# Patient Record
Sex: FEMALE | Race: WHITE | Employment: OTHER | URBAN - METROPOLITAN AREA
[De-identification: names, ages, dates, MRNs, and addresses within clinical notes are randomized per-mention and may not be internally consistent; named-entity substitution may affect disease eponyms.]

---

## 2018-11-23 ENCOUNTER — HOSPITAL ENCOUNTER (OUTPATIENT)
Age: 68
Discharge: HOME | End: 2018-11-23
Payer: MEDICARE

## 2018-11-23 ENCOUNTER — HOSPITAL ENCOUNTER (INPATIENT)
Age: 68
LOS: 4 days | Discharge: NURSING, REHAB OR LONG-TERM CARE | DRG: 534 | End: 2018-11-27
Attending: EMERGENCY MEDICINE | Admitting: FAMILY MEDICINE
Payer: MEDICARE

## 2018-11-23 ENCOUNTER — APPOINTMENT (OUTPATIENT)
Dept: CT IMAGING | Age: 68
DRG: 534 | End: 2018-11-23
Payer: MEDICARE

## 2018-11-23 ENCOUNTER — APPOINTMENT (OUTPATIENT)
Dept: GENERAL RADIOLOGY | Age: 68
DRG: 534 | End: 2018-11-23
Payer: MEDICARE

## 2018-11-23 DIAGNOSIS — E87.5 HYPERKALEMIA: ICD-10-CM

## 2018-11-23 DIAGNOSIS — N18.4 STAGE 4 CHRONIC KIDNEY DISEASE (HCC): ICD-10-CM

## 2018-11-23 DIAGNOSIS — R77.8 ELEVATED TROPONIN: ICD-10-CM

## 2018-11-23 DIAGNOSIS — S72.432A: Primary | ICD-10-CM

## 2018-11-23 PROBLEM — M25.9 KNEE PROBLEM: Status: ACTIVE | Noted: 2018-11-23

## 2018-11-23 LAB
ALBUMIN SERPL-MCNC: 3.2 G/DL (ref 3.5–5.2)
ALP BLD-CCNC: 71 U/L (ref 35–104)
ALT SERPL-CCNC: 14 U/L (ref 0–32)
ANION GAP SERPL CALCULATED.3IONS-SCNC: 15 MMOL/L (ref 7–16)
AST SERPL-CCNC: 17 U/L (ref 0–31)
BASOPHILS ABSOLUTE: 0.03 E9/L (ref 0–0.2)
BASOPHILS RELATIVE PERCENT: 0.2 % (ref 0–2)
BILIRUB SERPL-MCNC: 0.3 MG/DL (ref 0–1.2)
BUN BLDV-MCNC: 71 MG/DL (ref 8–23)
CALCIUM SERPL-MCNC: 8.3 MG/DL (ref 8.6–10.2)
CHLORIDE BLD-SCNC: 111 MMOL/L (ref 98–107)
CO2: 20 MMOL/L (ref 22–29)
CREAT SERPL-MCNC: 3.6 MG/DL (ref 0.5–1)
EKG ATRIAL RATE: 102 BPM
EKG P AXIS: 49 DEGREES
EKG P-R INTERVAL: 196 MS
EKG Q-T INTERVAL: 404 MS
EKG QRS DURATION: 128 MS
EKG QTC CALCULATION (BAZETT): 526 MS
EKG R AXIS: -37 DEGREES
EKG T AXIS: 97 DEGREES
EKG VENTRICULAR RATE: 102 BPM
EOSINOPHILS ABSOLUTE: 0.21 E9/L (ref 0.05–0.5)
EOSINOPHILS RELATIVE PERCENT: 1.5 % (ref 0–6)
GFR AFRICAN AMERICAN: 15
GFR NON-AFRICAN AMERICAN: 13 ML/MIN/1.73
GLUCOSE BLD-MCNC: 145 MG/DL (ref 74–99)
HCT VFR BLD CALC: 27.7 % (ref 34–48)
HEMOGLOBIN: 8.7 G/DL (ref 11.5–15.5)
IMMATURE GRANULOCYTES #: 0.11 E9/L
IMMATURE GRANULOCYTES %: 0.8 % (ref 0–5)
LYMPHOCYTES ABSOLUTE: 1.26 E9/L (ref 1.5–4)
LYMPHOCYTES RELATIVE PERCENT: 8.8 % (ref 20–42)
MCH RBC QN AUTO: 31.2 PG (ref 26–35)
MCHC RBC AUTO-ENTMCNC: 31.4 % (ref 32–34.5)
MCV RBC AUTO: 99.3 FL (ref 80–99.9)
MONOCYTES ABSOLUTE: 1.21 E9/L (ref 0.1–0.95)
MONOCYTES RELATIVE PERCENT: 8.5 % (ref 2–12)
NEUTROPHILS ABSOLUTE: 11.46 E9/L (ref 1.8–7.3)
NEUTROPHILS RELATIVE PERCENT: 80.2 % (ref 43–80)
PDW BLD-RTO: 12.3 FL (ref 11.5–15)
PLATELET # BLD: 218 E9/L (ref 130–450)
PMV BLD AUTO: 10.1 FL (ref 7–12)
POTASSIUM SERPL-SCNC: 5.4 MMOL/L (ref 3.5–5)
RBC # BLD: 2.79 E12/L (ref 3.5–5.5)
SODIUM BLD-SCNC: 146 MMOL/L (ref 132–146)
TOTAL PROTEIN: 6.1 G/DL (ref 6.4–8.3)
TROPONIN: 0.06 NG/ML (ref 0–0.03)
WBC # BLD: 14.3 E9/L (ref 4.5–11.5)

## 2018-11-23 PROCEDURE — 73502 X-RAY EXAM HIP UNI 2-3 VIEWS: CPT

## 2018-11-23 PROCEDURE — 99285 EMERGENCY DEPT VISIT HI MDM: CPT

## 2018-11-23 PROCEDURE — 73564 X-RAY EXAM KNEE 4 OR MORE: CPT

## 2018-11-23 PROCEDURE — 94760 N-INVAS EAR/PLS OXIMETRY 1: CPT

## 2018-11-23 PROCEDURE — 96374 THER/PROPH/DIAG INJ IV PUSH: CPT

## 2018-11-23 PROCEDURE — 72192 CT PELVIS W/O DYE: CPT

## 2018-11-23 PROCEDURE — 73700 CT LOWER EXTREMITY W/O DYE: CPT

## 2018-11-23 PROCEDURE — A0428 BLS: HCPCS

## 2018-11-23 PROCEDURE — 2580000003 HC RX 258: Performed by: EMERGENCY MEDICINE

## 2018-11-23 PROCEDURE — 36415 COLL VENOUS BLD VENIPUNCTURE: CPT

## 2018-11-23 PROCEDURE — 6360000002 HC RX W HCPCS: Performed by: EMERGENCY MEDICINE

## 2018-11-23 PROCEDURE — 85025 COMPLETE CBC W/AUTO DIFF WBC: CPT

## 2018-11-23 PROCEDURE — 80053 COMPREHEN METABOLIC PANEL: CPT

## 2018-11-23 PROCEDURE — A0425 GROUND MILEAGE: HCPCS

## 2018-11-23 PROCEDURE — 6370000000 HC RX 637 (ALT 250 FOR IP): Performed by: EMERGENCY MEDICINE

## 2018-11-23 PROCEDURE — 84484 ASSAY OF TROPONIN QUANT: CPT

## 2018-11-23 PROCEDURE — 71101 X-RAY EXAM UNILAT RIBS/CHEST: CPT

## 2018-11-23 PROCEDURE — 1200000000 HC SEMI PRIVATE

## 2018-11-23 PROCEDURE — 96375 TX/PRO/DX INJ NEW DRUG ADDON: CPT

## 2018-11-23 RX ORDER — MORPHINE SULFATE 4 MG/ML
4 INJECTION, SOLUTION INTRAMUSCULAR; INTRAVENOUS ONCE
Status: COMPLETED | OUTPATIENT
Start: 2018-11-23 | End: 2018-11-23

## 2018-11-23 RX ORDER — ONDANSETRON 2 MG/ML
4 INJECTION INTRAMUSCULAR; INTRAVENOUS ONCE
Status: COMPLETED | OUTPATIENT
Start: 2018-11-23 | End: 2018-11-23

## 2018-11-23 RX ORDER — FUROSEMIDE 10 MG/ML
40 INJECTION INTRAMUSCULAR; INTRAVENOUS ONCE
Status: COMPLETED | OUTPATIENT
Start: 2018-11-23 | End: 2018-11-23

## 2018-11-23 RX ORDER — OXYCODONE HYDROCHLORIDE AND ACETAMINOPHEN 5; 325 MG/1; MG/1
2 TABLET ORAL ONCE
Status: COMPLETED | OUTPATIENT
Start: 2018-11-23 | End: 2018-11-23

## 2018-11-23 RX ORDER — SODIUM POLYSTYRENE SULFONATE 15 G/60ML
15 SUSPENSION ORAL; RECTAL ONCE
Status: COMPLETED | OUTPATIENT
Start: 2018-11-23 | End: 2018-11-23

## 2018-11-23 RX ORDER — SODIUM CHLORIDE 9 MG/ML
500 INJECTION, SOLUTION INTRAVENOUS CONTINUOUS
Status: DISCONTINUED | OUTPATIENT
Start: 2018-11-23 | End: 2018-11-27 | Stop reason: HOSPADM

## 2018-11-23 RX ADMIN — SODIUM POLYSTYRENE SULFONATE 15 G: 15 SUSPENSION ORAL; RECTAL at 23:05

## 2018-11-23 RX ADMIN — SODIUM CHLORIDE 500 ML: 9 INJECTION, SOLUTION INTRAVENOUS at 21:45

## 2018-11-23 RX ADMIN — FUROSEMIDE 40 MG: 10 INJECTION, SOLUTION INTRAMUSCULAR; INTRAVENOUS at 23:05

## 2018-11-23 RX ADMIN — ONDANSETRON 4 MG: 2 INJECTION INTRAMUSCULAR; INTRAVENOUS at 21:40

## 2018-11-23 RX ADMIN — MORPHINE SULFATE 4 MG: 4 INJECTION INTRAVENOUS at 21:40

## 2018-11-23 RX ADMIN — OXYCODONE AND ACETAMINOPHEN 2 TABLET: 5; 325 TABLET ORAL at 19:29

## 2018-11-23 ASSESSMENT — PAIN DESCRIPTION - LOCATION
LOCATION: RIB CAGE
LOCATION: RIB CAGE
LOCATION: HIP;KNEE

## 2018-11-23 ASSESSMENT — PAIN SCALES - GENERAL
PAINLEVEL_OUTOF10: 6
PAINLEVEL_OUTOF10: 6
PAINLEVEL_OUTOF10: 8
PAINLEVEL_OUTOF10: 8
PAINLEVEL_OUTOF10: 4

## 2018-11-23 ASSESSMENT — PAIN DESCRIPTION - ORIENTATION
ORIENTATION: LEFT

## 2018-11-23 ASSESSMENT — PAIN DESCRIPTION - PROGRESSION: CLINICAL_PROGRESSION: GRADUALLY IMPROVING

## 2018-11-23 ASSESSMENT — PAIN DESCRIPTION - PAIN TYPE
TYPE: ACUTE PAIN
TYPE: ACUTE PAIN

## 2018-11-23 ASSESSMENT — PAIN DESCRIPTION - DESCRIPTORS
DESCRIPTORS: DISCOMFORT
DESCRIPTORS: DISCOMFORT
DESCRIPTORS: ACHING

## 2018-11-23 ASSESSMENT — PAIN DESCRIPTION - FREQUENCY: FREQUENCY: CONTINUOUS

## 2018-11-23 NOTE — ED PROVIDER NOTES
HPI:  18, Time: 6:41 PM        Laurie Tomas is a 70 y.o. female presenting to the ED for L knee and L rib pain , beginning 1 day ago after falling in her driveway. The complaint has been persistent, severe in severity, and worsened by changing position. Pt. Denies hitting her head or sustaining any LOC; no neck pain no back pain. She states she landed on her left side she primarily complains of the left knee and left rib pain and also some left hip pain. No other injures sustained. No relieving factors reported she's not had any nausea vomiting or any change in bowel or bladder habit patterns at all. Review of Systems:   Pertinent positives and negatives are stated within HPI, all other systems reviewed and are negative.      --------------------------------------------- PAST HISTORY ---------------------------------------------  Past Medical History:  has a past medical history of Arthritis; Chronic knee pain; Depression; Diabetes mellitus ([address] Gall stones; Hypertension; Kidney disease; Kidney stones; Obesity; Sarcoidosis; SOBOE (shortness of breath on exertion); Tremor; and Urinary anomaly. Past Surgical History:  has a past surgical history that includes Foot surgery (2009 ); Cystocopy (2011 );  section (x3); Upper gastrointestinal endoscopy (2.18.15); and Cholecystectomy (3/31/16). Social History:  reports that she quit smoking about 7 years ago. She has a 30.00 pack-year smoking history. She has never used smokeless tobacco. She reports that she does not drink alcohol or use drugs. Family History: family history includes Cancer in her sister. The patients home medications have been reviewed.     Allergies: Penicillins    -------------------------------------------------- RESULTS -------------------------------------------------  All laboratory and radiology results have been personally reviewed by myself   LABS:  Results for orders placed or performed during the hospital

## 2018-11-24 ENCOUNTER — APPOINTMENT (OUTPATIENT)
Dept: GENERAL RADIOLOGY | Age: 68
DRG: 534 | End: 2018-11-24
Payer: MEDICARE

## 2018-11-24 PROBLEM — S72.432A: Status: ACTIVE | Noted: 2018-11-24

## 2018-11-24 PROBLEM — N18.30 ANEMIA DUE TO STAGE 3 CHRONIC KIDNEY DISEASE (HCC): Status: ACTIVE | Noted: 2018-11-24

## 2018-11-24 PROBLEM — S72.415D CLOSED NONDISPLACED FRACTURE OF CONDYLE OF LEFT FEMUR WITH ROUTINE HEALING: Status: ACTIVE | Noted: 2018-11-24

## 2018-11-24 PROBLEM — D63.1 ANEMIA DUE TO STAGE 3 CHRONIC KIDNEY DISEASE (HCC): Status: ACTIVE | Noted: 2018-11-24

## 2018-11-24 LAB
ALBUMIN SERPL-MCNC: 2.8 G/DL (ref 3.5–5.2)
ALP BLD-CCNC: 62 U/L (ref 35–104)
ALT SERPL-CCNC: 12 U/L (ref 0–32)
ANION GAP SERPL CALCULATED.3IONS-SCNC: 14 MMOL/L (ref 7–16)
AST SERPL-CCNC: 14 U/L (ref 0–31)
BASOPHILS ABSOLUTE: 0.04 E9/L (ref 0–0.2)
BASOPHILS RELATIVE PERCENT: 0.5 % (ref 0–2)
BILIRUB SERPL-MCNC: 0.3 MG/DL (ref 0–1.2)
BUN BLDV-MCNC: 68 MG/DL (ref 8–23)
CALCIUM SERPL-MCNC: 8 MG/DL (ref 8.6–10.2)
CHLORIDE BLD-SCNC: 109 MMOL/L (ref 98–107)
CO2: 22 MMOL/L (ref 22–29)
CREAT SERPL-MCNC: 3.8 MG/DL (ref 0.5–1)
EOSINOPHILS ABSOLUTE: 0.19 E9/L (ref 0.05–0.5)
EOSINOPHILS RELATIVE PERCENT: 2.5 % (ref 0–6)
GFR AFRICAN AMERICAN: 14
GFR NON-AFRICAN AMERICAN: 12 ML/MIN/1.73
GLUCOSE BLD-MCNC: 197 MG/DL (ref 74–99)
HBA1C MFR BLD: 6.1 % (ref 4–5.6)
HCT VFR BLD CALC: 24.9 % (ref 34–48)
HEMOGLOBIN: 7.7 G/DL (ref 11.5–15.5)
IMMATURE GRANULOCYTES #: 0.05 E9/L
IMMATURE GRANULOCYTES %: 0.7 % (ref 0–5)
LYMPHOCYTES ABSOLUTE: 0.86 E9/L (ref 1.5–4)
LYMPHOCYTES RELATIVE PERCENT: 11.3 % (ref 20–42)
MCH RBC QN AUTO: 30.9 PG (ref 26–35)
MCHC RBC AUTO-ENTMCNC: 30.9 % (ref 32–34.5)
MCV RBC AUTO: 100 FL (ref 80–99.9)
METER GLUCOSE: 149 MG/DL (ref 74–99)
METER GLUCOSE: 152 MG/DL (ref 74–99)
METER GLUCOSE: 172 MG/DL (ref 74–99)
METER GLUCOSE: 81 MG/DL (ref 74–99)
MONOCYTES ABSOLUTE: 0.62 E9/L (ref 0.1–0.95)
MONOCYTES RELATIVE PERCENT: 8.2 % (ref 2–12)
NEUTROPHILS ABSOLUTE: 5.84 E9/L (ref 1.8–7.3)
NEUTROPHILS RELATIVE PERCENT: 76.8 % (ref 43–80)
PDW BLD-RTO: 12.4 FL (ref 11.5–15)
PLATELET # BLD: 170 E9/L (ref 130–450)
PMV BLD AUTO: 10.1 FL (ref 7–12)
POTASSIUM SERPL-SCNC: 5.1 MMOL/L (ref 3.5–5)
RBC # BLD: 2.49 E12/L (ref 3.5–5.5)
SODIUM BLD-SCNC: 145 MMOL/L (ref 132–146)
TOTAL PROTEIN: 5.6 G/DL (ref 6.4–8.3)
WBC # BLD: 7.6 E9/L (ref 4.5–11.5)

## 2018-11-24 PROCEDURE — 2580000003 HC RX 258: Performed by: FAMILY MEDICINE

## 2018-11-24 PROCEDURE — 82962 GLUCOSE BLOOD TEST: CPT

## 2018-11-24 PROCEDURE — 99222 1ST HOSP IP/OBS MODERATE 55: CPT | Performed by: FAMILY MEDICINE

## 2018-11-24 PROCEDURE — 80053 COMPREHEN METABOLIC PANEL: CPT

## 2018-11-24 PROCEDURE — 1200000000 HC SEMI PRIVATE

## 2018-11-24 PROCEDURE — 6360000002 HC RX W HCPCS: Performed by: FAMILY MEDICINE

## 2018-11-24 PROCEDURE — 73564 X-RAY EXAM KNEE 4 OR MORE: CPT

## 2018-11-24 PROCEDURE — 85025 COMPLETE CBC W/AUTO DIFF WBC: CPT

## 2018-11-24 PROCEDURE — 6370000000 HC RX 637 (ALT 250 FOR IP): Performed by: FAMILY MEDICINE

## 2018-11-24 PROCEDURE — 83036 HEMOGLOBIN GLYCOSYLATED A1C: CPT

## 2018-11-24 PROCEDURE — 36415 COLL VENOUS BLD VENIPUNCTURE: CPT

## 2018-11-24 RX ORDER — FERROUS SULFATE 325(65) MG
325 TABLET ORAL 2 TIMES DAILY WITH MEALS
Status: DISCONTINUED | OUTPATIENT
Start: 2018-11-24 | End: 2018-11-27 | Stop reason: HOSPADM

## 2018-11-24 RX ORDER — CALCITRIOL 0.25 UG/1
0.25 CAPSULE, LIQUID FILLED ORAL DAILY
Status: DISCONTINUED | OUTPATIENT
Start: 2018-11-24 | End: 2018-11-27 | Stop reason: HOSPADM

## 2018-11-24 RX ORDER — NICOTINE POLACRILEX 4 MG
15 LOZENGE BUCCAL PRN
Status: DISCONTINUED | OUTPATIENT
Start: 2018-11-24 | End: 2018-11-27 | Stop reason: HOSPADM

## 2018-11-24 RX ORDER — HYDRALAZINE HYDROCHLORIDE 25 MG/1
25 TABLET, FILM COATED ORAL 3 TIMES DAILY
Status: DISCONTINUED | OUTPATIENT
Start: 2018-11-24 | End: 2018-11-26

## 2018-11-24 RX ORDER — DEXTROSE MONOHYDRATE 25 G/50ML
12.5 INJECTION, SOLUTION INTRAVENOUS PRN
Status: DISCONTINUED | OUTPATIENT
Start: 2018-11-24 | End: 2018-11-27 | Stop reason: HOSPADM

## 2018-11-24 RX ORDER — INSULIN GLARGINE 100 [IU]/ML
10 INJECTION, SOLUTION SUBCUTANEOUS NIGHTLY
Status: DISCONTINUED | OUTPATIENT
Start: 2018-11-24 | End: 2018-11-25

## 2018-11-24 RX ORDER — DEXTROSE MONOHYDRATE 50 MG/ML
100 INJECTION, SOLUTION INTRAVENOUS PRN
Status: DISCONTINUED | OUTPATIENT
Start: 2018-11-24 | End: 2018-11-27 | Stop reason: HOSPADM

## 2018-11-24 RX ORDER — SODIUM CHLORIDE 0.9 % (FLUSH) 0.9 %
10 SYRINGE (ML) INJECTION PRN
Status: DISCONTINUED | OUTPATIENT
Start: 2018-11-24 | End: 2018-11-27 | Stop reason: HOSPADM

## 2018-11-24 RX ORDER — GLIMEPIRIDE 2 MG/1
2 TABLET ORAL 2 TIMES DAILY WITH MEALS
Status: DISCONTINUED | OUTPATIENT
Start: 2018-11-24 | End: 2018-11-27 | Stop reason: HOSPADM

## 2018-11-24 RX ORDER — MORPHINE SULFATE 2 MG/ML
1 INJECTION, SOLUTION INTRAMUSCULAR; INTRAVENOUS ONCE
Status: COMPLETED | OUTPATIENT
Start: 2018-11-24 | End: 2018-11-24

## 2018-11-24 RX ORDER — SODIUM CHLORIDE 0.9 % (FLUSH) 0.9 %
10 SYRINGE (ML) INJECTION EVERY 12 HOURS SCHEDULED
Status: DISCONTINUED | OUTPATIENT
Start: 2018-11-24 | End: 2018-11-27 | Stop reason: HOSPADM

## 2018-11-24 RX ORDER — ONDANSETRON 2 MG/ML
4 INJECTION INTRAMUSCULAR; INTRAVENOUS EVERY 6 HOURS PRN
Status: DISCONTINUED | OUTPATIENT
Start: 2018-11-24 | End: 2018-11-25

## 2018-11-24 RX ORDER — GABAPENTIN 300 MG/1
300 CAPSULE ORAL 3 TIMES DAILY
Status: DISCONTINUED | OUTPATIENT
Start: 2018-11-24 | End: 2018-11-27 | Stop reason: HOSPADM

## 2018-11-24 RX ORDER — AMLODIPINE BESYLATE 10 MG/1
10 TABLET ORAL DAILY
Status: DISCONTINUED | OUTPATIENT
Start: 2018-11-24 | End: 2018-11-27 | Stop reason: HOSPADM

## 2018-11-24 RX ADMIN — HYDRALAZINE HYDROCHLORIDE 25 MG: 25 TABLET, FILM COATED ORAL at 08:31

## 2018-11-24 RX ADMIN — HYDRALAZINE HYDROCHLORIDE 25 MG: 25 TABLET, FILM COATED ORAL at 22:00

## 2018-11-24 RX ADMIN — FERROUS SULFATE TAB 325 MG (65 MG ELEMENTAL FE) 325 MG: 325 (65 FE) TAB at 16:03

## 2018-11-24 RX ADMIN — FERROUS SULFATE TAB 325 MG (65 MG ELEMENTAL FE) 325 MG: 325 (65 FE) TAB at 08:31

## 2018-11-24 RX ADMIN — GABAPENTIN 300 MG: 300 CAPSULE ORAL at 08:31

## 2018-11-24 RX ADMIN — GLIMEPIRIDE 2 MG: 2 TABLET ORAL at 16:03

## 2018-11-24 RX ADMIN — MORPHINE SULFATE 1 MG: 2 INJECTION, SOLUTION INTRAMUSCULAR; INTRAVENOUS at 06:25

## 2018-11-24 RX ADMIN — INSULIN LISPRO 1 UNITS: 100 INJECTION, SOLUTION INTRAVENOUS; SUBCUTANEOUS at 22:01

## 2018-11-24 RX ADMIN — INSULIN LISPRO 2 UNITS: 100 INJECTION, SOLUTION INTRAVENOUS; SUBCUTANEOUS at 12:12

## 2018-11-24 RX ADMIN — HYDRALAZINE HYDROCHLORIDE 25 MG: 25 TABLET, FILM COATED ORAL at 13:36

## 2018-11-24 RX ADMIN — GLIMEPIRIDE 2 MG: 2 TABLET ORAL at 08:31

## 2018-11-24 RX ADMIN — CALCITRIOL 0.25 MCG: 0.25 CAPSULE ORAL at 08:31

## 2018-11-24 RX ADMIN — Medication 10 ML: at 08:32

## 2018-11-24 RX ADMIN — INSULIN GLARGINE 10 UNITS: 100 INJECTION, SOLUTION SUBCUTANEOUS at 22:01

## 2018-11-24 RX ADMIN — AMLODIPINE BESYLATE 10 MG: 10 TABLET ORAL at 08:32

## 2018-11-24 RX ADMIN — GABAPENTIN 300 MG: 300 CAPSULE ORAL at 13:37

## 2018-11-24 RX ADMIN — INSULIN LISPRO 2 UNITS: 100 INJECTION, SOLUTION INTRAVENOUS; SUBCUTANEOUS at 08:38

## 2018-11-24 RX ADMIN — Medication 10 ML: at 22:00

## 2018-11-24 RX ADMIN — GABAPENTIN 300 MG: 300 CAPSULE ORAL at 22:00

## 2018-11-24 ASSESSMENT — PAIN DESCRIPTION - ORIENTATION: ORIENTATION: LEFT

## 2018-11-24 ASSESSMENT — PAIN SCALES - GENERAL
PAINLEVEL_OUTOF10: 0
PAINLEVEL_OUTOF10: 0
PAINLEVEL_OUTOF10: 6
PAINLEVEL_OUTOF10: 0
PAINLEVEL_OUTOF10: 8
PAINLEVEL_OUTOF10: 0

## 2018-11-24 ASSESSMENT — PAIN DESCRIPTION - LOCATION: LOCATION: KNEE

## 2018-11-24 ASSESSMENT — PAIN DESCRIPTION - PROGRESSION: CLINICAL_PROGRESSION: NOT CHANGED

## 2018-11-24 ASSESSMENT — PAIN DESCRIPTION - DESCRIPTORS: DESCRIPTORS: ACHING;CONSTANT

## 2018-11-24 ASSESSMENT — PAIN DESCRIPTION - PAIN TYPE: TYPE: ACUTE PAIN

## 2018-11-24 ASSESSMENT — PAIN DESCRIPTION - FREQUENCY: FREQUENCY: CONTINUOUS

## 2018-11-24 NOTE — CONSULTS
Department of Orthopedic Surgery  Resident Consult Note          Reason for Consult:  Left Knee pain     HISTORY OF PRESENT ILLNESS:       Patient is a 70 y.o. female who presents with left knee pain after fall 1 day. Pt was able to able after the fall. But then started to have increase pain yesterday. Pt has a history of b/l knee OA treated will injections 1 week. The patient is community Ambulator without assist. Pt lives at Home. Denies numbness/tingling/paresthesias. Denies any other orthopedic complaints at this time.        Past Medical History:        Diagnosis Date    Arthritis     knees    Chronic knee pain     Depression     Diabetes mellitus (HCC)     type 2    Gall stones     Hypertension     Kidney disease     Kidney stones     Obesity     Sarcoidosis     SOBOE 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     Past Surgical History:        Procedure Laterality Date     SECTION  x3    CHOLECYSTECTOMY  3/31/16    Laparoscopic-Dr. Sharyle Grave    CYSTOSCOPY       for kidney stones    FOOT SURGERY       right     UPPER GASTROINTESTINAL ENDOSCOPY  2.18.15    Dr. Florencio Michele, [facility] Findings: Mild Gastrits and Duodenitis, 2cm Hiatal Hernia     Current Medications:   Current Facility-Administered Medications: amLODIPine (NORVASC) tablet 10 mg, 10 mg, Oral, Daily  calcitRIOL (ROCALTROL) capsule 0.25 mcg, 0.25 mcg, Oral, Daily  ferrous sulfate tablet 325 mg, 325 mg, Oral, BID WC  gabapentin (NEURONTIN) capsule 300 mg, 300 mg, Oral, TID  glimepiride (AMARYL) tablet 2 mg, 2 mg, Oral, BID WC  hydrALAZINE (APRESOLINE) tablet 25 mg, 25 mg, Oral, TID  insulin glargine (LANTUS) injection vial 10 Units, 10 Units, Subcutaneous, Nightly  sodium chloride flush 0.9 % injection 10 mL, 10 mL, Intravenous, 2 times per day  sodium chloride flush 0.9 % injection 10 mL, 10 mL, Intravenous, PRN  ondansetron (ZOFRAN) injection 4 mg, 4 mg, Intravenous, Q6H
Nightly  sodium chloride flush 0.9 % injection 10 mL, 10 mL, Intravenous, 2 times per day  sodium chloride flush 0.9 % injection 10 mL, 10 mL, Intravenous, PRN  ondansetron (ZOFRAN) injection 4 mg, 4 mg, Intravenous, Q6H PRN  glucose (GLUTOSE) 40 % oral gel 15 g, 15 g, Oral, PRN  dextrose 50 % solution 12.5 g, 12.5 g, Intravenous, PRN  glucagon (rDNA) injection 1 mg, 1 mg, Intramuscular, PRN  dextrose 5 % solution, 100 mL/hr, Intravenous, PRN  0.9 % sodium chloride infusion, 500 mL, Intravenous, Continuous     Allergies:  Patient has no known allergies.     Social History:   TOBACCO:   reports that she quit smoking about 7 years ago. She has a 30.00 pack-year smoking history. She has never used smokeless tobacco.  ETOH:   reports that she does not drink alcohol. DRUGS:   reports that she does not use drugs.   ACTIVITIES OF DAILY LIVING:    OCCUPATION:    Family History:   Family History         Family History   Problem Relation Age of Onset    Cancer Sister              REVIEW OF SYSTEMS:  CONSTITUTIONAL:  negative for  fevers, chills  EYES:  negative for blurred vision, visual disturbance  HEENT:  negative for  hearing loss, voice change  RESPIRATORY:  negative for  dyspnea, wheezing  CARDIOVASCULAR:  negative for  chest pain, palpitations  GASTROINTESTINAL:  negative for nausea, vomiting  GENITOURINARY:  negative for frequency, urinary incontinence  HEMATOLOGIC/LYMPHATIC:  negative for bleeding and petechiae  MUSCULOSKELETAL:  positive for  pain  NEUROLOGICAL:  negative for headaches, dizziness  BEHAVIOR/PSYCH:  negative for increased agitation and anxiety     PHYSICAL EXAM:    VITALS:  BP (!) 143/67   Pulse 78   Temp 97.4 °F (36.3 °C) (Temporal)   Resp 16   Ht 5' 2\" (1.575 m)   Wt 275 lb (124.7 kg)   SpO2 94%   BMI 50.30 kg/m²   CONSTITUTIONAL:  awake, alert, cooperative, no apparent distress, and appears stated age  MUSCULOSKELETAL:  Left lower Extremity:  · + TTP over the medial and lateral joint line

## 2018-11-25 LAB
ALBUMIN SERPL-MCNC: 2.8 G/DL (ref 3.5–5.2)
ALP BLD-CCNC: 61 U/L (ref 35–104)
ALT SERPL-CCNC: 11 U/L (ref 0–32)
ANION GAP SERPL CALCULATED.3IONS-SCNC: 13 MMOL/L (ref 7–16)
AST SERPL-CCNC: 13 U/L (ref 0–31)
BASOPHILS ABSOLUTE: 0.03 E9/L (ref 0–0.2)
BASOPHILS RELATIVE PERCENT: 0.4 % (ref 0–2)
BILIRUB SERPL-MCNC: 0.3 MG/DL (ref 0–1.2)
BUN BLDV-MCNC: 67 MG/DL (ref 8–23)
CALCIUM SERPL-MCNC: 7.8 MG/DL (ref 8.6–10.2)
CHLORIDE BLD-SCNC: 111 MMOL/L (ref 98–107)
CO2: 21 MMOL/L (ref 22–29)
CREAT SERPL-MCNC: 3.8 MG/DL (ref 0.5–1)
EOSINOPHILS ABSOLUTE: 0.11 E9/L (ref 0.05–0.5)
EOSINOPHILS RELATIVE PERCENT: 1.3 % (ref 0–6)
GFR AFRICAN AMERICAN: 14
GFR NON-AFRICAN AMERICAN: 12 ML/MIN/1.73
GLUCOSE BLD-MCNC: 39 MG/DL (ref 74–99)
HCT VFR BLD CALC: 24.6 % (ref 34–48)
HEMOGLOBIN: 7.6 G/DL (ref 11.5–15.5)
IMMATURE GRANULOCYTES #: 0.06 E9/L
IMMATURE GRANULOCYTES %: 0.7 % (ref 0–5)
LYMPHOCYTES ABSOLUTE: 1.18 E9/L (ref 1.5–4)
LYMPHOCYTES RELATIVE PERCENT: 14.1 % (ref 20–42)
MCH RBC QN AUTO: 30.9 PG (ref 26–35)
MCHC RBC AUTO-ENTMCNC: 30.9 % (ref 32–34.5)
MCV RBC AUTO: 100 FL (ref 80–99.9)
METER GLUCOSE: 136 MG/DL (ref 74–99)
METER GLUCOSE: 227 MG/DL (ref 74–99)
METER GLUCOSE: 43 MG/DL (ref 74–99)
METER GLUCOSE: 68 MG/DL (ref 74–99)
METER GLUCOSE: 84 MG/DL (ref 74–99)
MONOCYTES ABSOLUTE: 0.89 E9/L (ref 0.1–0.95)
MONOCYTES RELATIVE PERCENT: 10.7 % (ref 2–12)
NEUTROPHILS ABSOLUTE: 6.07 E9/L (ref 1.8–7.3)
NEUTROPHILS RELATIVE PERCENT: 72.8 % (ref 43–80)
PDW BLD-RTO: 12.5 FL (ref 11.5–15)
PLATELET # BLD: 172 E9/L (ref 130–450)
PMV BLD AUTO: 10.3 FL (ref 7–12)
POTASSIUM SERPL-SCNC: 4.6 MMOL/L (ref 3.5–5)
RBC # BLD: 2.46 E12/L (ref 3.5–5.5)
SODIUM BLD-SCNC: 145 MMOL/L (ref 132–146)
TOTAL PROTEIN: 5.4 G/DL (ref 6.4–8.3)
WBC # BLD: 8.3 E9/L (ref 4.5–11.5)

## 2018-11-25 PROCEDURE — 6370000000 HC RX 637 (ALT 250 FOR IP): Performed by: FAMILY MEDICINE

## 2018-11-25 PROCEDURE — 36415 COLL VENOUS BLD VENIPUNCTURE: CPT

## 2018-11-25 PROCEDURE — G8988 SELF CARE GOAL STATUS: HCPCS

## 2018-11-25 PROCEDURE — 82962 GLUCOSE BLOOD TEST: CPT

## 2018-11-25 PROCEDURE — 97530 THERAPEUTIC ACTIVITIES: CPT

## 2018-11-25 PROCEDURE — 99233 SBSQ HOSP IP/OBS HIGH 50: CPT | Performed by: FAMILY MEDICINE

## 2018-11-25 PROCEDURE — 85025 COMPLETE CBC W/AUTO DIFF WBC: CPT

## 2018-11-25 PROCEDURE — G8987 SELF CARE CURRENT STATUS: HCPCS

## 2018-11-25 PROCEDURE — G8979 MOBILITY GOAL STATUS: HCPCS

## 2018-11-25 PROCEDURE — 97166 OT EVAL MOD COMPLEX 45 MIN: CPT

## 2018-11-25 PROCEDURE — 1200000000 HC SEMI PRIVATE

## 2018-11-25 PROCEDURE — 80053 COMPREHEN METABOLIC PANEL: CPT

## 2018-11-25 PROCEDURE — G8978 MOBILITY CURRENT STATUS: HCPCS

## 2018-11-25 PROCEDURE — 2580000003 HC RX 258: Performed by: FAMILY MEDICINE

## 2018-11-25 RX ORDER — DOCUSATE SODIUM 100 MG/1
100 CAPSULE, LIQUID FILLED ORAL DAILY
Status: DISCONTINUED | OUTPATIENT
Start: 2018-11-25 | End: 2018-11-27 | Stop reason: HOSPADM

## 2018-11-25 RX ORDER — ACETAMINOPHEN 325 MG/1
650 TABLET ORAL EVERY 4 HOURS PRN
Status: DISCONTINUED | OUTPATIENT
Start: 2018-11-25 | End: 2018-11-27 | Stop reason: HOSPADM

## 2018-11-25 RX ADMIN — MAGNESIUM HYDROXIDE 30 ML: 400 SUSPENSION ORAL at 11:13

## 2018-11-25 RX ADMIN — GABAPENTIN 300 MG: 300 CAPSULE ORAL at 21:19

## 2018-11-25 RX ADMIN — AMLODIPINE BESYLATE 10 MG: 10 TABLET ORAL at 08:12

## 2018-11-25 RX ADMIN — Medication 10 ML: at 08:15

## 2018-11-25 RX ADMIN — GLIMEPIRIDE 2 MG: 2 TABLET ORAL at 17:41

## 2018-11-25 RX ADMIN — GABAPENTIN 300 MG: 300 CAPSULE ORAL at 14:15

## 2018-11-25 RX ADMIN — DEXTROSE 15 G: 15 GEL ORAL at 06:33

## 2018-11-25 RX ADMIN — GLIMEPIRIDE 2 MG: 2 TABLET ORAL at 08:12

## 2018-11-25 RX ADMIN — GABAPENTIN 300 MG: 300 CAPSULE ORAL at 08:12

## 2018-11-25 RX ADMIN — FERROUS SULFATE TAB 325 MG (65 MG ELEMENTAL FE) 325 MG: 325 (65 FE) TAB at 17:41

## 2018-11-25 RX ADMIN — CALCITRIOL 0.25 MCG: 0.25 CAPSULE ORAL at 08:12

## 2018-11-25 RX ADMIN — DOCUSATE SODIUM 100 MG: 100 CAPSULE, LIQUID FILLED ORAL at 08:12

## 2018-11-25 RX ADMIN — HYDRALAZINE HYDROCHLORIDE 25 MG: 25 TABLET, FILM COATED ORAL at 21:19

## 2018-11-25 RX ADMIN — HYDRALAZINE HYDROCHLORIDE 25 MG: 25 TABLET, FILM COATED ORAL at 08:12

## 2018-11-25 RX ADMIN — HYDRALAZINE HYDROCHLORIDE 25 MG: 25 TABLET, FILM COATED ORAL at 14:15

## 2018-11-25 RX ADMIN — FERROUS SULFATE TAB 325 MG (65 MG ELEMENTAL FE) 325 MG: 325 (65 FE) TAB at 08:12

## 2018-11-25 ASSESSMENT — PAIN SCALES - GENERAL
PAINLEVEL_OUTOF10: 0

## 2018-11-25 NOTE — PROGRESS NOTES
Admit Date: 11/23/2018       Subjective:    bp  143/64  p 79  Glucose low this am in the 40's up to 60 with tx. Will stop insuloin for now. a1c 6.1, see how this goies. hgb low  7.6,  ckd bun/cr  68/3.8   Did have GI bleed in 2016 with neg upper and lower scope. Bleeding scan showed small bowel etiology. Di have multiple transfusions. Will hemoccult stool. Pt states source of bleed never found. I donot know baselin but in June was 8.4  Will ask renal input.   Last scope 2016 Dr Lynn Paredes      Fem condyle fx part WB 50% with immobilizer- not in room yet and no getting up until immobilizer here         Objective:    Scheduled Meds:  Current Facility-Administered Medications   Medication Dose Route Frequency Provider Last Rate Last Dose    amLODIPine (NORVASC) tablet 10 mg  10 mg Oral Daily Astrid Guerin MD   10 mg at 11/24/18 4057    calcitRIOL (ROCALTROL) capsule 0.25 mcg  0.25 mcg Oral Daily Astrid Guerin MD   0.25 mcg at 11/24/18 0831    ferrous sulfate tablet 325 mg  325 mg Oral BID  Astrid Guerin MD   325 mg at 11/24/18 1603    gabapentin (NEURONTIN) capsule 300 mg  300 mg Oral TID Astrid Guerin MD   300 mg at 11/24/18 2200    glimepiride (AMARYL) tablet 2 mg  2 mg Oral BID  Astrid Guerin MD   2 mg at 11/24/18 1603    hydrALAZINE (APRESOLINE) tablet 25 mg  25 mg Oral TID Astrid Guerin MD   25 mg at 11/24/18 2200    insulin glargine (LANTUS) injection vial 10 Units  10 Units Subcutaneous Nightly Astrid Guerin MD   10 Units at 11/24/18 2201    sodium chloride flush 0.9 % injection 10 mL  10 mL Intravenous 2 times per day Astrid Guerin MD   10 mL at 11/24/18 2200    sodium chloride flush 0.9 % injection 10 mL  10 mL Intravenous PRN Astrid Guerin MD        ondansetron [facility]) injection 4 mg  4 mg Intravenous Q6H PRN Astrid Guerin MD        glucose (GLUTOSE) 40 % oral gel 15 g  15 g Oral PRN Astrid Guerin MD   15 g at 11/25/18 0633    dextrose 50 %

## 2018-11-26 LAB
ALBUMIN SERPL-MCNC: 2.7 G/DL (ref 3.5–5.2)
ALP BLD-CCNC: 61 U/L (ref 35–104)
ALT SERPL-CCNC: 11 U/L (ref 0–32)
ANION GAP SERPL CALCULATED.3IONS-SCNC: 12 MMOL/L (ref 7–16)
AST SERPL-CCNC: 12 U/L (ref 0–31)
BASOPHILS ABSOLUTE: 0.03 E9/L (ref 0–0.2)
BASOPHILS RELATIVE PERCENT: 0.5 % (ref 0–2)
BILIRUB SERPL-MCNC: 0.3 MG/DL (ref 0–1.2)
BUN BLDV-MCNC: 59 MG/DL (ref 8–23)
CALCIUM SERPL-MCNC: 8.4 MG/DL (ref 8.6–10.2)
CHLORIDE BLD-SCNC: 111 MMOL/L (ref 98–107)
CO2: 22 MMOL/L (ref 22–29)
CREAT SERPL-MCNC: 3.3 MG/DL (ref 0.5–1)
EOSINOPHILS ABSOLUTE: 0.18 E9/L (ref 0.05–0.5)
EOSINOPHILS RELATIVE PERCENT: 2.8 % (ref 0–6)
GFR AFRICAN AMERICAN: 17
GFR NON-AFRICAN AMERICAN: 14 ML/MIN/1.73
GLUCOSE BLD-MCNC: 74 MG/DL (ref 74–99)
HCT VFR BLD CALC: 25.8 % (ref 34–48)
HEMOGLOBIN: 7.9 G/DL (ref 11.5–15.5)
IMMATURE GRANULOCYTES #: 0.04 E9/L
IMMATURE GRANULOCYTES %: 0.6 % (ref 0–5)
LYMPHOCYTES ABSOLUTE: 1.07 E9/L (ref 1.5–4)
LYMPHOCYTES RELATIVE PERCENT: 16.7 % (ref 20–42)
MCH RBC QN AUTO: 30.3 PG (ref 26–35)
MCHC RBC AUTO-ENTMCNC: 30.6 % (ref 32–34.5)
MCV RBC AUTO: 98.9 FL (ref 80–99.9)
METER GLUCOSE: 159 MG/DL (ref 74–99)
METER GLUCOSE: 169 MG/DL (ref 74–99)
METER GLUCOSE: 62 MG/DL (ref 74–99)
METER GLUCOSE: 91 MG/DL (ref 74–99)
METER GLUCOSE: 99 MG/DL (ref 74–99)
MONOCYTES ABSOLUTE: 0.78 E9/L (ref 0.1–0.95)
MONOCYTES RELATIVE PERCENT: 12.2 % (ref 2–12)
NEUTROPHILS ABSOLUTE: 4.3 E9/L (ref 1.8–7.3)
NEUTROPHILS RELATIVE PERCENT: 67.2 % (ref 43–80)
PDW BLD-RTO: 12.4 FL (ref 11.5–15)
PLATELET # BLD: 163 E9/L (ref 130–450)
PMV BLD AUTO: 10.6 FL (ref 7–12)
POTASSIUM SERPL-SCNC: 4.7 MMOL/L (ref 3.5–5)
RBC # BLD: 2.61 E12/L (ref 3.5–5.5)
SODIUM BLD-SCNC: 145 MMOL/L (ref 132–146)
TOTAL PROTEIN: 5.6 G/DL (ref 6.4–8.3)
WBC # BLD: 6.4 E9/L (ref 4.5–11.5)

## 2018-11-26 PROCEDURE — 85025 COMPLETE CBC W/AUTO DIFF WBC: CPT

## 2018-11-26 PROCEDURE — 80053 COMPREHEN METABOLIC PANEL: CPT

## 2018-11-26 PROCEDURE — 82962 GLUCOSE BLOOD TEST: CPT

## 2018-11-26 PROCEDURE — 36415 COLL VENOUS BLD VENIPUNCTURE: CPT

## 2018-11-26 PROCEDURE — 6370000000 HC RX 637 (ALT 250 FOR IP): Performed by: FAMILY MEDICINE

## 2018-11-26 PROCEDURE — 6370000000 HC RX 637 (ALT 250 FOR IP): Performed by: NURSE PRACTITIONER

## 2018-11-26 PROCEDURE — 1200000000 HC SEMI PRIVATE

## 2018-11-26 PROCEDURE — 6360000002 HC RX W HCPCS: Performed by: FAMILY MEDICINE

## 2018-11-26 RX ORDER — HYDRALAZINE HYDROCHLORIDE 50 MG/1
50 TABLET, FILM COATED ORAL 3 TIMES DAILY
Status: DISCONTINUED | OUTPATIENT
Start: 2018-11-26 | End: 2018-11-27 | Stop reason: HOSPADM

## 2018-11-26 RX ORDER — HEPARIN SODIUM 10000 [USP'U]/ML
5000 INJECTION, SOLUTION INTRAVENOUS; SUBCUTANEOUS EVERY 8 HOURS
Status: DISCONTINUED | OUTPATIENT
Start: 2018-11-26 | End: 2018-11-27 | Stop reason: HOSPADM

## 2018-11-26 RX ORDER — HYDROCODONE BITARTRATE AND ACETAMINOPHEN 5; 325 MG/1; MG/1
1 TABLET ORAL EVERY 6 HOURS PRN
Status: DISCONTINUED | OUTPATIENT
Start: 2018-11-26 | End: 2018-11-27 | Stop reason: HOSPADM

## 2018-11-26 RX ADMIN — FERROUS SULFATE TAB 325 MG (65 MG ELEMENTAL FE) 325 MG: 325 (65 FE) TAB at 18:06

## 2018-11-26 RX ADMIN — DOCUSATE SODIUM 100 MG: 100 CAPSULE, LIQUID FILLED ORAL at 14:49

## 2018-11-26 RX ADMIN — GLIMEPIRIDE 2 MG: 2 TABLET ORAL at 18:06

## 2018-11-26 RX ADMIN — GABAPENTIN 300 MG: 300 CAPSULE ORAL at 20:29

## 2018-11-26 RX ADMIN — HYDRALAZINE HYDROCHLORIDE 25 MG: 25 TABLET, FILM COATED ORAL at 07:54

## 2018-11-26 RX ADMIN — GLIMEPIRIDE 2 MG: 2 TABLET ORAL at 07:54

## 2018-11-26 RX ADMIN — AMLODIPINE BESYLATE 10 MG: 10 TABLET ORAL at 07:54

## 2018-11-26 RX ADMIN — GABAPENTIN 300 MG: 300 CAPSULE ORAL at 14:49

## 2018-11-26 RX ADMIN — FERROUS SULFATE TAB 325 MG (65 MG ELEMENTAL FE) 325 MG: 325 (65 FE) TAB at 07:54

## 2018-11-26 RX ADMIN — GABAPENTIN 300 MG: 300 CAPSULE ORAL at 07:54

## 2018-11-26 RX ADMIN — HYDRALAZINE HYDROCHLORIDE 25 MG: 25 TABLET, FILM COATED ORAL at 14:48

## 2018-11-26 RX ADMIN — CALCITRIOL 0.25 MCG: 0.25 CAPSULE ORAL at 07:54

## 2018-11-26 RX ADMIN — HEPARIN SODIUM 5000 UNITS: 10000 INJECTION INTRAVENOUS; SUBCUTANEOUS at 19:03

## 2018-11-26 RX ADMIN — HYDRALAZINE HYDROCHLORIDE 50 MG: 50 TABLET, FILM COATED ORAL at 20:29

## 2018-11-26 ASSESSMENT — PAIN SCALES - GENERAL
PAINLEVEL_OUTOF10: 0

## 2018-11-26 NOTE — PROGRESS NOTES
NEPHROLOGY Attending   Progress Note  11/26/2018 5:54 PM  Subjective:   Admit Date: 11/23/2018  PCP: Tiara Jones MD    Interval History: following for management of CKD  11/26- awake and alert. She has some knee pain, other than that she is doing OK    Diet: DIET CARB CONTROL; Carb Control: 4 carb choices (60 gms)/meal    Data:   Scheduled Meds:   docusate sodium  100 mg Oral Daily    amLODIPine  10 mg Oral Daily    calcitRIOL  0.25 mcg Oral Daily    ferrous sulfate  325 mg Oral BID WC    gabapentin  300 mg Oral TID    glimepiride  2 mg Oral BID WC    hydrALAZINE  25 mg Oral TID    sodium chloride flush  10 mL Intravenous 2 times per day     Continuous Infusions:   dextrose      sodium chloride 500 mL (11/23/18 5018)     PRN Meds:trimethobenzamide, acetaminophen, magnesium hydroxide, sodium chloride flush, glucose, dextrose, glucagon (rDNA), dextrose    Intake/Output Summary (Last 24 hours) at 11/26/18 1754  Last data filed at 11/26/18 1528   Gross per 24 hour   Intake              840 ml   Output             1250 ml   Net             -410 ml     CBC:   Recent Labs      11/24/18   0941  11/25/18   0607  11/26/18   0501   WBC  7.6  8.3  6.4   HGB  7.7*  7.6*  7.9*   PLT  170  172  163     BMP:  Recent Labs      11/24/18   0941  11/25/18   0607  11/26/18   0501   NA  145  145  145   K  5.1*  4.6  4.7   CL  109*  111*  111*   CO2  22  21*  22   BUN  68*  67*  59*   CREATININE  3.8*  3.8*  3.3*   GLUCOSE  197*  39*  74     Hepatic: Recent Labs      11/24/18   0941  11/25/18   0607  11/26/18   0501   AST  14  13  12   ALT  12  11  11   BILITOT  0.3  0.3  0.3   ALKPHOS  62  61  61     Troponin:   Recent Labs      11/23/18   2150   TROPONINI  0.06*     BNP: No results for input(s): BNP in the last 72 hours. Lipids: No results for input(s): CHOL, HDL in the last 72 hours.     Invalid input(s): LDLCALCU  ABGs: No results found for: PHART, PO2ART, HJN5WNQ  INR: No results for input(s): INR in the last 72 definitive fractures seen on this study which is severely limited by patient's body habitus and underlying degree of osteopenia. ALERT:  THIS IS AN ABNORMAL REPORT 1. No acute fracture identified, limited study secondary to patient's body habitus and underlying osteopenia. If there is persistent clinical pain or symptomatology, a return to medical attention within 2-7 days and further imaging is recommended. . If there is persistent clinical concern for pain given the presence of the joint effusion a CT scan of the left knee could be utilized for better evaluation and to exclude the potential fracture at this time if clinically warranted. 2. Moderately severe joint space loss and degenerative changes of the medial and the lateral compartments with tricompartmental osteophyte formation within the medial, lateral and patellofemoral compartments. 3. Osteopenia. Fall. Vascular cast case. 5. Moderate-sized joint effusion. Xr Knee Right (min 4 Views)    Result Date: 2018  Patient MRN:  [id number] : 1950 Age: 70 years Gender: Female Order Date:  2018 10:45 AM EXAM: XR KNEE RIGHT (MIN 4 VIEWS) NUMBER OF IMAGES:  4 views INDICATION: Right knee pain in patient morbidly obese habitus COMPARISON: Prior right knee images 2013 and CT right knee images 2009 and yesterday 2007 hours FINDINGS: There is valgus alignment to the knee joint with narrowing of the medial and lateral joint compartments, and hyperostotic irregular flaring of the articular margins of the medial and lateral compartments as well as the patellofemoral joint. No definite fractures identified radiographically. Diabetic-type arterial calcification is noted, and a soft tissue circumscribed calcification in the proximal lower leg is likely phlebolith. There is no evidence of joint effusion. Advanced degenerative changes of the right knee with tricompartmental involvement and valgus alignment.  No evidence of joint effusion. Diabetic arterial calcifications are noted. Ct Pelvis Wo Contrast Additional Contrast? None    Addendum Date: 2018    Addendum: Additional images were obtained in a different angle of the right and left femoral head/neck and intertrochanteric region. A total number of images is presently 56. 89year-old female patient with a history of fall trauma injury pain. FINDINGS: With the additional available images there is no indication that there is an acute fracture in the right femoral head, neck and intertrochanteric region proximal right femoral shaft or in the right acetabulum. In the left hip, there is some relative increased density of the left femoral head/neck trabecular bone pattern . Cannot see conspicuously an acute fracture. Mild degenerative changes are seen in the left hip joint. There is no conspicuous a shortened deformity of the left femoral neck . There is no fractures in the pelvic bones. There is no fracture in the left acetabulum. There is no fracture of the sacral wings. Degenerative changes are seen in the SI joints. IMPRESSION: 1. Cannot conspicuously identified on acute displaced fracture in the left or in the right hip or in the pelvic bones 2. In case of persistence of clinical symptomatology MRI study can be a consideration to investigate for bone marrow edema. 3. Considering the body habitus of the patient correlation with nuclear medicine bone scan could be an alternative however the nuclear medicine bone scan will be positive for acute trauma injury after 3-5 days from the trauma insult. And    Result Date: 2018  Patient MRN: [id number] : 1950 Age:  70 years Order Date: 2018 8:15 PM Gender: Female Exam: CT PELVIS WO CONTRAST Number of Images: [id number] Indication:   Fall, evaluate pelvis, nondiagnostic x-rays Comparison: CT abdomen pelvis 3/29/2016. Pelvis and left hip x-rays 2018 Findings:  This examination was performed on a CT scanner with dose reduction. Technique: Low-dose CT  acquisition technique included one of following options; 1 . Automated exposure control, 2. Adjustment of MA and or KV according to patient's size or 3. Use of iterative reconstruction. . Lack of IV contrast limits the interpretation and the sensitivity of the exam in the evaluation of solid organs and vasculature, including but not limited to lack of detection of underlying mass or malignancy and/or any other potential abnormalities. Visualized portions of the uterus and bladder are grossly unremarkable. There is air within the bladder without identification of a Castelan catheter. There is fecal retention within the rectum and distal sigmoid colon. There is a fat-containing ventral hernia in the midline with transverse opening measuring approximately 8.4 cm. Vascular calcifications within the major arterial vessels. Limited evaluation of bony structures secondary to attenuation artifact. There is moderate joint space loss and acetabular sclerosis of the hips bilaterally. Visualized portions of the right iliac wing, left iliac wing, transverse processes, pedicles, lamina and spinous processes of the lumbar spine, visualized sacrum, demonstrate no acute fracture or lytic or blastic bony lesion. The left femoral head demonstrates increased density which is of concern and not well evaluated on this study. There are similar findings involving the right femoral head also demonstrating increased density but to a lesser degree. ALERT:  THIS IS AN ABNORMAL REPORT. Findings regarding additional imaging were communicated directly with Dr. Capri Sawant on 11/23/2018 9:01 PM . 1. Limited evaluation secondary to attenuation artifact. There is increased density of the left femoral head greater than the right. Findings are indeterminate. I cannot exclude the possibility of an impacted fracture. Additional imaging on sagittal and coronal planes is recommended.  Please note a fracture is not excluded General appearance: alert, appears stated age and cooperative  Skin: Skin color, texture, turgor normal. No rashes or lesions  HEENT: Head: Normocephalic, no lesions, without obvious abnormality. Neck: no adenopathy, no carotid bruit, no JVD, supple, symmetrical, trachea midline and thyroid not enlarged, symmetric, no tenderness/mass/nodules  Lungs: clear and equal bilaterally  Heart: S1, S2 normal and no S3 or S4  Abdomen: soft, non-tender; bowel sounds normal; no masses,  no organomegaly  Extremities: edema bilateral trace  Neurologic: Mental status: Alert, oriented, thought content appropriate      Assessment/Plan:   1. Acute kidney injury most likely prerenal from poor oral intake. This is superimposed on chronic kidney disease stage IV baseline 3.0-3.6, she remains stable at her recent baseline. 2. Mild hyperkalemia due to acute kidney injury, now resolved. 3. Hypertension with chronic kidney disease- sub-optimal control in the setting of pain. Will up-titrate hydralazine and follow. 4. Anemia due to chronic kidney disease will check iron studies in the am.          PABLO Echeverria - CNP     Patient seen and examined all key components of the physical performed independently , case discussed with NP, all pertinent labs and radiologic tests personally reviewed agree with above.       Leslie Blandon MD

## 2018-11-27 VITALS
HEART RATE: 78 BPM | WEIGHT: 275 LBS | TEMPERATURE: 97.8 F | SYSTOLIC BLOOD PRESSURE: 170 MMHG | DIASTOLIC BLOOD PRESSURE: 76 MMHG | RESPIRATION RATE: 18 BRPM | OXYGEN SATURATION: 97 % | HEIGHT: 62 IN | BODY MASS INDEX: 50.61 KG/M2

## 2018-11-27 LAB
ALBUMIN SERPL-MCNC: 3 G/DL (ref 3.5–5.2)
ALP BLD-CCNC: 58 U/L (ref 35–104)
ALT SERPL-CCNC: 9 U/L (ref 0–32)
ANION GAP SERPL CALCULATED.3IONS-SCNC: 13 MMOL/L (ref 7–16)
AST SERPL-CCNC: 12 U/L (ref 0–31)
BASOPHILS ABSOLUTE: 0.05 E9/L (ref 0–0.2)
BASOPHILS RELATIVE PERCENT: 0.7 % (ref 0–2)
BILIRUB SERPL-MCNC: 0.3 MG/DL (ref 0–1.2)
BUN BLDV-MCNC: 56 MG/DL (ref 8–23)
CALCIUM SERPL-MCNC: 8.2 MG/DL (ref 8.6–10.2)
CHLORIDE BLD-SCNC: 111 MMOL/L (ref 98–107)
CO2: 21 MMOL/L (ref 22–29)
CREAT SERPL-MCNC: 3.2 MG/DL (ref 0.5–1)
EOSINOPHILS ABSOLUTE: 0.17 E9/L (ref 0.05–0.5)
EOSINOPHILS RELATIVE PERCENT: 2.4 % (ref 0–6)
FERRITIN: 139 NG/ML
FOLATE: 3.9 NG/ML (ref 4.8–24.2)
GFR AFRICAN AMERICAN: 17
GFR NON-AFRICAN AMERICAN: 14 ML/MIN/1.73
GLUCOSE BLD-MCNC: 52 MG/DL (ref 74–99)
HCT VFR BLD CALC: 23.6 % (ref 34–48)
HEMOGLOBIN: 7.4 G/DL (ref 11.5–15.5)
IMMATURE GRANULOCYTES #: 0.05 E9/L
IMMATURE GRANULOCYTES %: 0.7 % (ref 0–5)
IRON SATURATION: 17 % (ref 15–50)
IRON: 31 MCG/DL (ref 37–145)
LYMPHOCYTES ABSOLUTE: 1.2 E9/L (ref 1.5–4)
LYMPHOCYTES RELATIVE PERCENT: 17.3 % (ref 20–42)
MCH RBC QN AUTO: 30.7 PG (ref 26–35)
MCHC RBC AUTO-ENTMCNC: 31.4 % (ref 32–34.5)
MCV RBC AUTO: 97.9 FL (ref 80–99.9)
METER GLUCOSE: 111 MG/DL (ref 74–99)
MONOCYTES ABSOLUTE: 0.77 E9/L (ref 0.1–0.95)
MONOCYTES RELATIVE PERCENT: 11.1 % (ref 2–12)
NEUTROPHILS ABSOLUTE: 4.71 E9/L (ref 1.8–7.3)
NEUTROPHILS RELATIVE PERCENT: 67.8 % (ref 43–80)
PDW BLD-RTO: 12.6 FL (ref 11.5–15)
PLATELET # BLD: 161 E9/L (ref 130–450)
PMV BLD AUTO: 10.5 FL (ref 7–12)
POTASSIUM SERPL-SCNC: 5 MMOL/L (ref 3.5–5)
RBC # BLD: 2.41 E12/L (ref 3.5–5.5)
SODIUM BLD-SCNC: 145 MMOL/L (ref 132–146)
TOTAL IRON BINDING CAPACITY: 183 MCG/DL (ref 250–450)
TOTAL PROTEIN: 5.4 G/DL (ref 6.4–8.3)
VITAMIN B-12: 1573 PG/ML (ref 211–946)
WBC # BLD: 7 E9/L (ref 4.5–11.5)

## 2018-11-27 PROCEDURE — 82962 GLUCOSE BLOOD TEST: CPT

## 2018-11-27 PROCEDURE — 6370000000 HC RX 637 (ALT 250 FOR IP): Performed by: FAMILY MEDICINE

## 2018-11-27 PROCEDURE — 82728 ASSAY OF FERRITIN: CPT

## 2018-11-27 PROCEDURE — 83540 ASSAY OF IRON: CPT

## 2018-11-27 PROCEDURE — 83550 IRON BINDING TEST: CPT

## 2018-11-27 PROCEDURE — 6360000002 HC RX W HCPCS: Performed by: FAMILY MEDICINE

## 2018-11-27 PROCEDURE — 82746 ASSAY OF FOLIC ACID SERUM: CPT

## 2018-11-27 PROCEDURE — 97535 SELF CARE MNGMENT TRAINING: CPT

## 2018-11-27 PROCEDURE — 97530 THERAPEUTIC ACTIVITIES: CPT

## 2018-11-27 PROCEDURE — 85025 COMPLETE CBC W/AUTO DIFF WBC: CPT

## 2018-11-27 PROCEDURE — 80053 COMPREHEN METABOLIC PANEL: CPT

## 2018-11-27 PROCEDURE — 36415 COLL VENOUS BLD VENIPUNCTURE: CPT

## 2018-11-27 PROCEDURE — 6370000000 HC RX 637 (ALT 250 FOR IP): Performed by: NURSE PRACTITIONER

## 2018-11-27 PROCEDURE — 82607 VITAMIN B-12: CPT

## 2018-11-27 RX ORDER — HEPARIN SODIUM 10000 [USP'U]/ML
5000 INJECTION, SOLUTION INTRAVENOUS; SUBCUTANEOUS EVERY 8 HOURS
Status: ON HOLD | DISCHARGE
Start: 2018-11-27 | End: 2019-05-03 | Stop reason: HOSPADM

## 2018-11-27 RX ORDER — HYDROCODONE BITARTRATE AND ACETAMINOPHEN 5; 325 MG/1; MG/1
1 TABLET ORAL EVERY 6 HOURS PRN
Qty: 28 TABLET | Refills: 0 | Status: ON HOLD | OUTPATIENT
Start: 2018-11-27 | End: 2019-05-03

## 2018-11-27 RX ORDER — PSEUDOEPHEDRINE HCL 30 MG
100 TABLET ORAL DAILY
Status: ON HOLD | DISCHARGE
Start: 2018-11-28 | End: 2019-05-30

## 2018-11-27 RX ADMIN — FERROUS SULFATE TAB 325 MG (65 MG ELEMENTAL FE) 325 MG: 325 (65 FE) TAB at 16:56

## 2018-11-27 RX ADMIN — GLIMEPIRIDE 2 MG: 2 TABLET ORAL at 16:56

## 2018-11-27 RX ADMIN — HYDRALAZINE HYDROCHLORIDE 50 MG: 50 TABLET, FILM COATED ORAL at 08:27

## 2018-11-27 RX ADMIN — HEPARIN SODIUM 5000 UNITS: 10000 INJECTION INTRAVENOUS; SUBCUTANEOUS at 14:00

## 2018-11-27 RX ADMIN — HYDRALAZINE HYDROCHLORIDE 50 MG: 50 TABLET, FILM COATED ORAL at 14:01

## 2018-11-27 RX ADMIN — HYDROCODONE BITARTRATE AND ACETAMINOPHEN 1 TABLET: 5; 325 TABLET ORAL at 00:07

## 2018-11-27 RX ADMIN — HYDROCODONE BITARTRATE AND ACETAMINOPHEN 1 TABLET: 5; 325 TABLET ORAL at 12:47

## 2018-11-27 RX ADMIN — AMLODIPINE BESYLATE 10 MG: 10 TABLET ORAL at 08:27

## 2018-11-27 RX ADMIN — DOCUSATE SODIUM 100 MG: 100 CAPSULE, LIQUID FILLED ORAL at 08:27

## 2018-11-27 RX ADMIN — FERROUS SULFATE TAB 325 MG (65 MG ELEMENTAL FE) 325 MG: 325 (65 FE) TAB at 08:27

## 2018-11-27 RX ADMIN — GABAPENTIN 300 MG: 300 CAPSULE ORAL at 08:27

## 2018-11-27 RX ADMIN — CALCITRIOL 0.25 MCG: 0.25 CAPSULE ORAL at 08:27

## 2018-11-27 RX ADMIN — HEPARIN SODIUM 5000 UNITS: 10000 INJECTION INTRAVENOUS; SUBCUTANEOUS at 06:42

## 2018-11-27 RX ADMIN — GABAPENTIN 300 MG: 300 CAPSULE ORAL at 14:01

## 2018-11-27 ASSESSMENT — PAIN DESCRIPTION - ONSET
ONSET: ON-GOING
ONSET: GRADUAL
ONSET: GRADUAL

## 2018-11-27 ASSESSMENT — PAIN SCALES - GENERAL
PAINLEVEL_OUTOF10: 8
PAINLEVEL_OUTOF10: 0
PAINLEVEL_OUTOF10: 6
PAINLEVEL_OUTOF10: 4

## 2018-11-27 ASSESSMENT — PAIN DESCRIPTION - PAIN TYPE
TYPE: ACUTE PAIN

## 2018-11-27 ASSESSMENT — PAIN DESCRIPTION - ORIENTATION
ORIENTATION: LEFT

## 2018-11-27 ASSESSMENT — PAIN DESCRIPTION - DESCRIPTORS
DESCRIPTORS: ACHING;CONSTANT;DISCOMFORT
DESCRIPTORS: ACHING;DISCOMFORT
DESCRIPTORS: ACHING;SORE;TIRING

## 2018-11-27 ASSESSMENT — PAIN DESCRIPTION - FREQUENCY
FREQUENCY: INTERMITTENT
FREQUENCY: CONTINUOUS
FREQUENCY: CONTINUOUS

## 2018-11-27 ASSESSMENT — PAIN SCALES - WONG BAKER: WONGBAKER_NUMERICALRESPONSE: 0

## 2018-11-27 ASSESSMENT — PAIN DESCRIPTION - PROGRESSION
CLINICAL_PROGRESSION: GRADUALLY IMPROVING
CLINICAL_PROGRESSION: NOT CHANGED

## 2018-11-27 ASSESSMENT — PAIN DESCRIPTION - LOCATION
LOCATION: KNEE
LOCATION: RIB CAGE
LOCATION: KNEE

## 2018-11-27 NOTE — CARE COORDINATION
Care transition note-pt has been accepted at [facility] and insurance precert has been obtained. However, the pt has a balance of $469 that must be paid before they will allow her to go to the facility. She stated that she cannot afford to pay this amount. She was offered a list of other facilities but she has refused to look at it. She states that if she cannot go to [facility], she will return home. She has adult children, but going to their homes is not an option. She states she will return home. Awaiting custom brace. Informed her that there is a discharge order.

## 2018-11-27 NOTE — PROGRESS NOTES
Nurse to nurse report called to [facility] at [facility]. Faxed AVS to [facility]# [phone].

## 2018-11-27 NOTE — PROGRESS NOTES
NEPHROLOGY Attending   Progress Note  11/27/2018 11:51 AM  Subjective:   Admit Date: 11/23/2018  PCP: Zohreh Joseph MD    Interval History: following for management of CKD  11/26- awake and alert. She has some knee pain, other than that she is doing OK  11/27:No  New c/o; awaiting leg brace    Diet: DIET CARB CONTROL; Carb Control: 4 carb choices (60 gms)/meal    Data:   Scheduled Meds:   hydrALAZINE  50 mg Oral TID    heparin (porcine)  5,000 Units Subcutaneous Q8H    docusate sodium  100 mg Oral Daily    amLODIPine  10 mg Oral Daily    calcitRIOL  0.25 mcg Oral Daily    ferrous sulfate  325 mg Oral BID WC    gabapentin  300 mg Oral TID    glimepiride  2 mg Oral BID WC    sodium chloride flush  10 mL Intravenous 2 times per day     Continuous Infusions:   dextrose      sodium chloride 500 mL (11/23/18 2145)     PRN Meds:HYDROcodone 5 mg - acetaminophen, trimethobenzamide, acetaminophen, magnesium hydroxide, sodium chloride flush, glucose, dextrose, glucagon (rDNA), dextrose    Intake/Output Summary (Last 24 hours) at 11/27/18 1151  Last data filed at 11/27/18 1044   Gross per 24 hour   Intake              720 ml   Output             2100 ml   Net            -1380 ml     CBC:   Recent Labs      11/25/18   0607  11/26/18   0501  11/27/18   0452   WBC  8.3  6.4  7.0   HGB  7.6*  7.9*  7.4*   PLT  172  163  161     BMP:    Recent Labs      11/25/18   0607  11/26/18   0501  11/27/18   0452   NA  145  145  145   K  4.6  4.7  5.0   CL  111*  111*  111*   CO2  21*  22  21*   BUN  67*  59*  56*   CREATININE  3.8*  3.3*  3.2*   GLUCOSE  39*  74  52*     Hepatic:   Recent Labs      11/25/18   0607  11/26/18   0501  11/27/18   0452   AST  13  12  12   ALT  11  11  9   BILITOT  0.3  0.3  0.3   ALKPHOS  61  61  58     Troponin:   No results for input(s): TROPONINI in the last 72 hours. BNP: No results for input(s): BNP in the last 72 hours. Lipids: No results for input(s): CHOL, HDL in the last 72 hours.     Invalid input(s): LDLCALCU  ABGs: No results found for: PHART, PO2ART, ZTO2UQA  INR: No results for input(s): INR in the last 72 hours. Objective:   Vitals:   Vitals:    11/27/18 0745   BP: (!) 170/76   Pulse: 78   Resp: 18   Temp: 97.8 °F (36.6 °C)   SpO2: 97%     Patient Vitals for the past 24 hrs:   BP Temp Temp src Pulse Resp SpO2   11/27/18 0745 (!) 170/76 97.8 °F (36.6 °C) Temporal 78 18 97 %   11/27/18 0000 (!) 151/68 98.6 °F (37 °C) Temporal 90 18 -   11/26/18 1500 (!) 155/70 97.8 °F (36.6 °C) Temporal 70 16 95 %   11/26/18 1448 (!) 161/74 - - - - -     General appearance: alert in no acute distress  Skin: Skin color, texture, turgor normal. No rashes or lesions  HEENT: Head: Normocephalic, no lesions, without obvious abnormality. Neck: no adenopathy, no carotid bruit, no JVD, supple, symmetrical, trachea midline and thyroid not enlarged, symmetric, no tenderness/mass/nodules  Lungs: clear and equal bilaterally  Heart: S1, S2 normal and no S3 or S4  Abdomen: soft, non-tender; bowel sounds normal; no masses,  no organomegaly  Extremities: edema bilateral trace  Neurologic: Mental status: Alert, oriented, thought content appropriate      Assessment/Plan:   1. Acute kidney injury most likely prerenal from poor oral intake. This is superimposed on chronic kidney disease stage IV baseline 3.0-3.6; Cr 3.8 on presentation, now improved to 3.2  2. Mild hyperkalemia due to acute kidney injury, now resolved; continue to monitor  3. Hypertension with chronic kidney disease- sub-optimal control in the setting of pain. Will up-titrate hydralazine and follow.    4. Anemia due to chronic kidney disease, suspect some blood loss; transfuse if Hb drops further      OK for discharge from Renal standpoint, once cleared by others         Kelton Chahal MD

## 2019-04-27 ENCOUNTER — HOSPITAL ENCOUNTER (INPATIENT)
Age: 69
LOS: 5 days | Discharge: NURSING, REHAB OR LONG-TERM CARE | DRG: 193 | End: 2019-05-03
Attending: EMERGENCY MEDICINE | Admitting: FAMILY MEDICINE
Payer: MEDICARE

## 2019-04-27 ENCOUNTER — APPOINTMENT (OUTPATIENT)
Dept: GENERAL RADIOLOGY | Age: 69
DRG: 193 | End: 2019-04-27
Payer: MEDICARE

## 2019-04-27 DIAGNOSIS — J96.01 ACUTE RESPIRATORY FAILURE WITH HYPOXIA (HCC): Primary | ICD-10-CM

## 2019-04-27 DIAGNOSIS — N18.9 CHRONIC KIDNEY DISEASE, UNSPECIFIED CKD STAGE: ICD-10-CM

## 2019-04-27 DIAGNOSIS — F41.9 ANXIETY: ICD-10-CM

## 2019-04-27 DIAGNOSIS — J18.9 COMMUNITY ACQUIRED PNEUMONIA, UNSPECIFIED LATERALITY: ICD-10-CM

## 2019-04-27 DIAGNOSIS — E87.5 HYPERKALEMIA: ICD-10-CM

## 2019-04-27 DIAGNOSIS — S72.432A: ICD-10-CM

## 2019-04-27 PROCEDURE — 84484 ASSAY OF TROPONIN QUANT: CPT

## 2019-04-27 PROCEDURE — 87502 INFLUENZA DNA AMP PROBE: CPT

## 2019-04-27 PROCEDURE — 94760 N-INVAS EAR/PLS OXIMETRY 1: CPT

## 2019-04-27 PROCEDURE — 83880 ASSAY OF NATRIURETIC PEPTIDE: CPT

## 2019-04-27 PROCEDURE — 85025 COMPLETE CBC W/AUTO DIFF WBC: CPT

## 2019-04-27 PROCEDURE — 93005 ELECTROCARDIOGRAM TRACING: CPT | Performed by: STUDENT IN AN ORGANIZED HEALTH CARE EDUCATION/TRAINING PROGRAM

## 2019-04-27 PROCEDURE — 83605 ASSAY OF LACTIC ACID: CPT

## 2019-04-27 PROCEDURE — 85378 FIBRIN DEGRADE SEMIQUANT: CPT

## 2019-04-27 PROCEDURE — 99285 EMERGENCY DEPT VISIT HI MDM: CPT

## 2019-04-27 PROCEDURE — 87040 BLOOD CULTURE FOR BACTERIA: CPT

## 2019-04-27 PROCEDURE — 71045 X-RAY EXAM CHEST 1 VIEW: CPT

## 2019-04-27 PROCEDURE — 80048 BASIC METABOLIC PNL TOTAL CA: CPT

## 2019-04-27 PROCEDURE — 36415 COLL VENOUS BLD VENIPUNCTURE: CPT

## 2019-04-27 ASSESSMENT — ENCOUNTER SYMPTOMS
SINUS PRESSURE: 0
SORE THROAT: 0
EYE PAIN: 0
PHOTOPHOBIA: 0
ABDOMINAL PAIN: 0
ABDOMINAL DISTENTION: 0
RHINORRHEA: 0
CHEST TIGHTNESS: 0
DIARRHEA: 0
BLOOD IN STOOL: 0
CONSTIPATION: 0
COUGH: 1
BACK PAIN: 0
NAUSEA: 0
WHEEZING: 0
EYE REDNESS: 0
TROUBLE SWALLOWING: 0
VOMITING: 0

## 2019-04-28 ENCOUNTER — APPOINTMENT (OUTPATIENT)
Dept: NUCLEAR MEDICINE | Age: 69
DRG: 193 | End: 2019-04-28
Payer: MEDICARE

## 2019-04-28 PROBLEM — J96.01 ACUTE RESPIRATORY FAILURE WITH HYPOXIA (HCC): Status: ACTIVE | Noted: 2019-04-28

## 2019-04-28 PROBLEM — J15.9 PNEUMONIA, BACTERIAL: Status: ACTIVE | Noted: 2019-04-28

## 2019-04-28 LAB
ANION GAP SERPL CALCULATED.3IONS-SCNC: 12 MMOL/L (ref 7–16)
ANION GAP SERPL CALCULATED.3IONS-SCNC: 9 MMOL/L (ref 7–16)
APTT: 28.2 SEC (ref 24.5–35.1)
BASOPHILS ABSOLUTE: 0 E9/L (ref 0–0.2)
BASOPHILS RELATIVE PERCENT: 0.2 % (ref 0–2)
BUN BLDV-MCNC: 43 MG/DL (ref 8–23)
BUN BLDV-MCNC: 44 MG/DL (ref 8–23)
CALCIUM SERPL-MCNC: 8.5 MG/DL (ref 8.6–10.2)
CALCIUM SERPL-MCNC: 8.6 MG/DL (ref 8.6–10.2)
CHLORIDE BLD-SCNC: 112 MMOL/L (ref 98–107)
CHLORIDE BLD-SCNC: 113 MMOL/L (ref 98–107)
CO2: 20 MMOL/L (ref 22–29)
CO2: 23 MMOL/L (ref 22–29)
CREAT SERPL-MCNC: 3.2 MG/DL (ref 0.5–1)
CREAT SERPL-MCNC: 3.4 MG/DL (ref 0.5–1)
D DIMER: 746 NG/ML DDU
EOSINOPHILS ABSOLUTE: 0 E9/L (ref 0.05–0.5)
EOSINOPHILS RELATIVE PERCENT: 0 % (ref 0–6)
FILM ARRAY ADENOVIRUS: NORMAL
FILM ARRAY BORDETELLA PERTUSSIS: NORMAL
FILM ARRAY CHLAMYDOPHILIA PNEUMONIAE: NORMAL
FILM ARRAY CORONAVIRUS 229E: NORMAL
FILM ARRAY CORONAVIRUS HKU1: NORMAL
FILM ARRAY CORONAVIRUS NL63: NORMAL
FILM ARRAY CORONAVIRUS OC43: NORMAL
FILM ARRAY INFLUENZA A VIRUS 09H1: NORMAL
FILM ARRAY INFLUENZA A VIRUS H1: NORMAL
FILM ARRAY INFLUENZA A VIRUS H3: NORMAL
FILM ARRAY INFLUENZA A VIRUS: NORMAL
FILM ARRAY INFLUENZA B: NORMAL
FILM ARRAY METAPNEUMOVIRUS: NORMAL
FILM ARRAY MYCOPLASMA PNEUMONIAE: NORMAL
FILM ARRAY PARAINFLUENZA VIRUS 1: NORMAL
FILM ARRAY PARAINFLUENZA VIRUS 2: NORMAL
FILM ARRAY PARAINFLUENZA VIRUS 3: NORMAL
FILM ARRAY PARAINFLUENZA VIRUS 4: NORMAL
FILM ARRAY RESPIRATORY SYNCITIAL VIRUS: NORMAL
FILM ARRAY RHINOVIRUS/ENTEROVIRUS: NORMAL
GFR AFRICAN AMERICAN: 16
GFR AFRICAN AMERICAN: 17
GFR NON-AFRICAN AMERICAN: 13 ML/MIN/1.73
GFR NON-AFRICAN AMERICAN: 14 ML/MIN/1.73
GLUCOSE BLD-MCNC: 186 MG/DL (ref 74–99)
GLUCOSE BLD-MCNC: 96 MG/DL (ref 74–99)
HCT VFR BLD CALC: 27.6 % (ref 34–48)
HCT VFR BLD CALC: 31.4 % (ref 34–48)
HEMOGLOBIN: 8.4 G/DL (ref 11.5–15.5)
HEMOGLOBIN: 9.4 G/DL (ref 11.5–15.5)
INFLUENZA A BY PCR: NOT DETECTED
INFLUENZA B BY PCR: NOT DETECTED
INR BLD: 1.1
L. PNEUMOPHILA SEROGP 1 UR AG: NORMAL
LACTIC ACID, SEPSIS: 1 MMOL/L (ref 0.5–1.9)
LACTIC ACID, SEPSIS: 1.8 MMOL/L (ref 0.5–1.9)
LYMPHOCYTES ABSOLUTE: 1.13 E9/L (ref 1.5–4)
LYMPHOCYTES RELATIVE PERCENT: 5.2 % (ref 20–42)
MCH RBC QN AUTO: 30.2 PG (ref 26–35)
MCH RBC QN AUTO: 30.9 PG (ref 26–35)
MCHC RBC AUTO-ENTMCNC: 29.9 % (ref 32–34.5)
MCHC RBC AUTO-ENTMCNC: 30.4 % (ref 32–34.5)
MCV RBC AUTO: 101 FL (ref 80–99.9)
MCV RBC AUTO: 101.5 FL (ref 80–99.9)
METER GLUCOSE: 180 MG/DL (ref 74–99)
METER GLUCOSE: 187 MG/DL (ref 74–99)
METER GLUCOSE: 222 MG/DL (ref 74–99)
METER GLUCOSE: 98 MG/DL (ref 74–99)
MONOCYTES ABSOLUTE: 0.9 E9/L (ref 0.1–0.95)
MONOCYTES RELATIVE PERCENT: 3.5 % (ref 2–12)
NEUTROPHILS ABSOLUTE: 20.48 E9/L (ref 1.8–7.3)
NEUTROPHILS RELATIVE PERCENT: 91.3 % (ref 43–80)
PDW BLD-RTO: 13.4 FL (ref 11.5–15)
PDW BLD-RTO: 13.6 FL (ref 11.5–15)
PLATELET # BLD: 196 E9/L (ref 130–450)
PLATELET # BLD: 203 E9/L (ref 130–450)
PMV BLD AUTO: 10.8 FL (ref 7–12)
PMV BLD AUTO: 10.9 FL (ref 7–12)
POTASSIUM SERPL-SCNC: 4.7 MMOL/L (ref 3.5–5)
POTASSIUM SERPL-SCNC: 5.7 MMOL/L (ref 3.5–5)
PRO-BNP: 3230 PG/ML (ref 0–125)
PROTHROMBIN TIME: 13.2 SEC (ref 9.3–12.4)
RBC # BLD: 2.72 E12/L (ref 3.5–5.5)
RBC # BLD: 3.11 E12/L (ref 3.5–5.5)
RBC # BLD: NORMAL 10*6/UL
SODIUM BLD-SCNC: 144 MMOL/L (ref 132–146)
SODIUM BLD-SCNC: 145 MMOL/L (ref 132–146)
STREP PNEUMONIAE ANTIGEN, URINE: NORMAL
TROPONIN: 0.04 NG/ML (ref 0–0.03)
WBC # BLD: 18.7 E9/L (ref 4.5–11.5)
WBC # BLD: 22.5 E9/L (ref 4.5–11.5)

## 2019-04-28 PROCEDURE — 87581 M.PNEUMON DNA AMP PROBE: CPT

## 2019-04-28 PROCEDURE — 6360000002 HC RX W HCPCS: Performed by: FAMILY MEDICINE

## 2019-04-28 PROCEDURE — 2700000000 HC OXYGEN THERAPY PER DAY

## 2019-04-28 PROCEDURE — 85610 PROTHROMBIN TIME: CPT

## 2019-04-28 PROCEDURE — A9558 XE133 XENON 10MCI: HCPCS | Performed by: RADIOLOGY

## 2019-04-28 PROCEDURE — 87450 HC DIRECT STREP B ANTIGEN: CPT

## 2019-04-28 PROCEDURE — 85027 COMPLETE CBC AUTOMATED: CPT

## 2019-04-28 PROCEDURE — 87040 BLOOD CULTURE FOR BACTERIA: CPT

## 2019-04-28 PROCEDURE — 87486 CHLMYD PNEUM DNA AMP PROBE: CPT

## 2019-04-28 PROCEDURE — 6370000000 HC RX 637 (ALT 250 FOR IP): Performed by: INTERNAL MEDICINE

## 2019-04-28 PROCEDURE — 2060000000 HC ICU INTERMEDIATE R&B

## 2019-04-28 PROCEDURE — 87633 RESP VIRUS 12-25 TARGETS: CPT

## 2019-04-28 PROCEDURE — 3430000000 HC RX DIAGNOSTIC RADIOPHARMACEUTICAL: Performed by: RADIOLOGY

## 2019-04-28 PROCEDURE — 6370000000 HC RX 637 (ALT 250 FOR IP): Performed by: STUDENT IN AN ORGANIZED HEALTH CARE EDUCATION/TRAINING PROGRAM

## 2019-04-28 PROCEDURE — 2580000003 HC RX 258: Performed by: STUDENT IN AN ORGANIZED HEALTH CARE EDUCATION/TRAINING PROGRAM

## 2019-04-28 PROCEDURE — 94664 DEMO&/EVAL PT USE INHALER: CPT

## 2019-04-28 PROCEDURE — 6360000002 HC RX W HCPCS: Performed by: STUDENT IN AN ORGANIZED HEALTH CARE EDUCATION/TRAINING PROGRAM

## 2019-04-28 PROCEDURE — 85730 THROMBOPLASTIN TIME PARTIAL: CPT

## 2019-04-28 PROCEDURE — 78582 LUNG VENTILAT&PERFUS IMAGING: CPT

## 2019-04-28 PROCEDURE — 2580000003 HC RX 258: Performed by: FAMILY MEDICINE

## 2019-04-28 PROCEDURE — 87798 DETECT AGENT NOS DNA AMP: CPT

## 2019-04-28 PROCEDURE — 6360000002 HC RX W HCPCS: Performed by: INTERNAL MEDICINE

## 2019-04-28 PROCEDURE — 83605 ASSAY OF LACTIC ACID: CPT

## 2019-04-28 PROCEDURE — 82962 GLUCOSE BLOOD TEST: CPT

## 2019-04-28 PROCEDURE — 80048 BASIC METABOLIC PNL TOTAL CA: CPT

## 2019-04-28 PROCEDURE — 6370000000 HC RX 637 (ALT 250 FOR IP): Performed by: FAMILY MEDICINE

## 2019-04-28 PROCEDURE — 94640 AIRWAY INHALATION TREATMENT: CPT

## 2019-04-28 PROCEDURE — 2500000003 HC RX 250 WO HCPCS: Performed by: STUDENT IN AN ORGANIZED HEALTH CARE EDUCATION/TRAINING PROGRAM

## 2019-04-28 PROCEDURE — A9540 TC99M MAA: HCPCS | Performed by: RADIOLOGY

## 2019-04-28 PROCEDURE — 36415 COLL VENOUS BLD VENIPUNCTURE: CPT

## 2019-04-28 RX ORDER — FERROUS SULFATE 325(65) MG
325 TABLET ORAL 2 TIMES DAILY WITH MEALS
Status: DISCONTINUED | OUTPATIENT
Start: 2019-04-28 | End: 2019-05-03 | Stop reason: HOSPADM

## 2019-04-28 RX ORDER — SODIUM CHLORIDE 0.9 % (FLUSH) 0.9 %
10 SYRINGE (ML) INJECTION EVERY 12 HOURS SCHEDULED
Status: DISCONTINUED | OUTPATIENT
Start: 2019-04-28 | End: 2019-05-03 | Stop reason: HOSPADM

## 2019-04-28 RX ORDER — SODIUM CHLORIDE 450 MG/100ML
INJECTION, SOLUTION INTRAVENOUS CONTINUOUS
Status: DISCONTINUED | OUTPATIENT
Start: 2019-04-28 | End: 2019-04-29

## 2019-04-28 RX ORDER — NICOTINE POLACRILEX 4 MG
15 LOZENGE BUCCAL PRN
Status: DISCONTINUED | OUTPATIENT
Start: 2019-04-28 | End: 2019-05-03 | Stop reason: HOSPADM

## 2019-04-28 RX ORDER — INSULIN GLARGINE 100 [IU]/ML
10 INJECTION, SOLUTION SUBCUTANEOUS NIGHTLY
Status: DISCONTINUED | OUTPATIENT
Start: 2019-04-28 | End: 2019-04-30

## 2019-04-28 RX ORDER — DEXTROSE MONOHYDRATE 50 MG/ML
100 INJECTION, SOLUTION INTRAVENOUS PRN
Status: DISCONTINUED | OUTPATIENT
Start: 2019-04-28 | End: 2019-05-03 | Stop reason: HOSPADM

## 2019-04-28 RX ORDER — WARFARIN SODIUM 5 MG/1
5 TABLET ORAL
Status: COMPLETED | OUTPATIENT
Start: 2019-04-28 | End: 2019-04-28

## 2019-04-28 RX ORDER — AMLODIPINE BESYLATE 10 MG/1
10 TABLET ORAL DAILY
Status: DISCONTINUED | OUTPATIENT
Start: 2019-04-28 | End: 2019-05-03 | Stop reason: HOSPADM

## 2019-04-28 RX ORDER — HEPARIN SODIUM 1000 [USP'U]/ML
10000 INJECTION, SOLUTION INTRAVENOUS; SUBCUTANEOUS PRN
Status: DISCONTINUED | OUTPATIENT
Start: 2019-04-28 | End: 2019-05-03 | Stop reason: HOSPADM

## 2019-04-28 RX ORDER — IPRATROPIUM BROMIDE AND ALBUTEROL SULFATE 2.5; .5 MG/3ML; MG/3ML
3 SOLUTION RESPIRATORY (INHALATION) ONCE
Status: COMPLETED | OUTPATIENT
Start: 2019-04-28 | End: 2019-04-28

## 2019-04-28 RX ORDER — HEPARIN SODIUM 10000 [USP'U]/ML
5000 INJECTION, SOLUTION INTRAVENOUS; SUBCUTANEOUS EVERY 8 HOURS
Status: DISCONTINUED | OUTPATIENT
Start: 2019-04-28 | End: 2019-04-28

## 2019-04-28 RX ORDER — HYDRALAZINE HYDROCHLORIDE 50 MG/1
25 TABLET, FILM COATED ORAL 3 TIMES DAILY
Status: DISCONTINUED | OUTPATIENT
Start: 2019-04-28 | End: 2019-05-02

## 2019-04-28 RX ORDER — 0.9 % SODIUM CHLORIDE 0.9 %
1000 INTRAVENOUS SOLUTION INTRAVENOUS ONCE
Status: COMPLETED | OUTPATIENT
Start: 2019-04-28 | End: 2019-04-28

## 2019-04-28 RX ORDER — HEPARIN SODIUM 1000 [USP'U]/ML
10000 INJECTION, SOLUTION INTRAVENOUS; SUBCUTANEOUS ONCE
Status: COMPLETED | OUTPATIENT
Start: 2019-04-28 | End: 2019-04-28

## 2019-04-28 RX ORDER — SODIUM CHLORIDE 0.9 % (FLUSH) 0.9 %
10 SYRINGE (ML) INJECTION PRN
Status: DISCONTINUED | OUTPATIENT
Start: 2019-04-28 | End: 2019-05-03 | Stop reason: HOSPADM

## 2019-04-28 RX ORDER — HEPARIN SODIUM 1000 [USP'U]/ML
5000 INJECTION, SOLUTION INTRAVENOUS; SUBCUTANEOUS PRN
Status: DISCONTINUED | OUTPATIENT
Start: 2019-04-28 | End: 2019-05-03 | Stop reason: HOSPADM

## 2019-04-28 RX ORDER — ACETAMINOPHEN 325 MG/1
650 TABLET ORAL EVERY 4 HOURS PRN
Status: DISCONTINUED | OUTPATIENT
Start: 2019-04-28 | End: 2019-05-03 | Stop reason: HOSPADM

## 2019-04-28 RX ORDER — DOCUSATE SODIUM 100 MG/1
100 CAPSULE, LIQUID FILLED ORAL DAILY
Status: DISCONTINUED | OUTPATIENT
Start: 2019-04-28 | End: 2019-05-03 | Stop reason: HOSPADM

## 2019-04-28 RX ORDER — CALCITRIOL 0.25 UG/1
0.25 CAPSULE, LIQUID FILLED ORAL DAILY
Status: DISCONTINUED | OUTPATIENT
Start: 2019-04-28 | End: 2019-05-03 | Stop reason: HOSPADM

## 2019-04-28 RX ORDER — GABAPENTIN 300 MG/1
300 CAPSULE ORAL 3 TIMES DAILY
Status: DISCONTINUED | OUTPATIENT
Start: 2019-04-28 | End: 2019-05-03 | Stop reason: HOSPADM

## 2019-04-28 RX ORDER — DEXTROSE MONOHYDRATE 25 G/50ML
12.5 INJECTION, SOLUTION INTRAVENOUS PRN
Status: DISCONTINUED | OUTPATIENT
Start: 2019-04-28 | End: 2019-05-03 | Stop reason: HOSPADM

## 2019-04-28 RX ORDER — HYDROCODONE BITARTRATE AND ACETAMINOPHEN 5; 325 MG/1; MG/1
1 TABLET ORAL EVERY 6 HOURS PRN
Status: DISCONTINUED | OUTPATIENT
Start: 2019-04-28 | End: 2019-05-03 | Stop reason: HOSPADM

## 2019-04-28 RX ORDER — ONDANSETRON 2 MG/ML
4 INJECTION INTRAMUSCULAR; INTRAVENOUS EVERY 6 HOURS PRN
Status: DISCONTINUED | OUTPATIENT
Start: 2019-04-28 | End: 2019-05-03 | Stop reason: HOSPADM

## 2019-04-28 RX ORDER — GUAIFENESIN/DEXTROMETHORPHAN 100-10MG/5
5 SYRUP ORAL EVERY 4 HOURS PRN
Status: DISCONTINUED | OUTPATIENT
Start: 2019-04-28 | End: 2019-05-03 | Stop reason: HOSPADM

## 2019-04-28 RX ORDER — ALBUTEROL SULFATE 2.5 MG/3ML
2.5 SOLUTION RESPIRATORY (INHALATION)
Status: DISCONTINUED | OUTPATIENT
Start: 2019-04-28 | End: 2019-05-01

## 2019-04-28 RX ORDER — DEXTROSE MONOHYDRATE 25 G/50ML
25 INJECTION, SOLUTION INTRAVENOUS ONCE
Status: DISCONTINUED | OUTPATIENT
Start: 2019-04-28 | End: 2019-05-03 | Stop reason: HOSPADM

## 2019-04-28 RX ORDER — HYDRALAZINE HYDROCHLORIDE 25 MG/1
25 TABLET, FILM COATED ORAL 3 TIMES DAILY
Status: DISCONTINUED | OUTPATIENT
Start: 2019-04-28 | End: 2019-04-28 | Stop reason: CLARIF

## 2019-04-28 RX ORDER — XENON XE-133 10 MCI/1
29 GAS RESPIRATORY (INHALATION)
Status: COMPLETED | OUTPATIENT
Start: 2019-04-28 | End: 2019-04-28

## 2019-04-28 RX ORDER — HEPARIN SODIUM 10000 [USP'U]/100ML
16.55 INJECTION, SOLUTION INTRAVENOUS CONTINUOUS
Status: DISCONTINUED | OUTPATIENT
Start: 2019-04-28 | End: 2019-05-03 | Stop reason: HOSPADM

## 2019-04-28 RX ORDER — GLIMEPIRIDE 2 MG/1
2 TABLET ORAL 2 TIMES DAILY WITH MEALS
Status: DISCONTINUED | OUTPATIENT
Start: 2019-04-28 | End: 2019-04-29

## 2019-04-28 RX ADMIN — HEPARIN SODIUM 5000 UNITS: 10000 INJECTION INTRAVENOUS; SUBCUTANEOUS at 13:47

## 2019-04-28 RX ADMIN — HEPARIN SODIUM 16.55 UNITS/KG/HR: 10000 INJECTION, SOLUTION INTRAVENOUS at 19:53

## 2019-04-28 RX ADMIN — INSULIN HUMAN 5 UNITS: 100 INJECTION, SOLUTION PARENTERAL at 01:23

## 2019-04-28 RX ADMIN — AMLODIPINE BESYLATE 10 MG: 10 TABLET ORAL at 08:26

## 2019-04-28 RX ADMIN — SODIUM CHLORIDE: 4.5 INJECTION, SOLUTION INTRAVENOUS at 04:15

## 2019-04-28 RX ADMIN — CALCITRIOL 0.25 MCG: 0.25 CAPSULE ORAL at 08:26

## 2019-04-28 RX ADMIN — XENON XE-133 29 MILLICURIE: 10 GAS RESPIRATORY (INHALATION) at 12:13

## 2019-04-28 RX ADMIN — Medication 8 MILLICURIE: at 12:13

## 2019-04-28 RX ADMIN — DOXYCYCLINE 100 MG: 100 INJECTION, POWDER, LYOPHILIZED, FOR SOLUTION INTRAVENOUS at 01:09

## 2019-04-28 RX ADMIN — HYDRALAZINE HYDROCHLORIDE 25 MG: 25 TABLET, FILM COATED ORAL at 08:26

## 2019-04-28 RX ADMIN — GUAIFENESIN AND DEXTROMETHORPHAN 5 ML: 100; 10 SYRUP ORAL at 10:19

## 2019-04-28 RX ADMIN — HEPARIN SODIUM 10000 UNITS: 1000 INJECTION INTRAVENOUS; SUBCUTANEOUS at 19:51

## 2019-04-28 RX ADMIN — GABAPENTIN 300 MG: 300 CAPSULE ORAL at 08:25

## 2019-04-28 RX ADMIN — DOCUSATE SODIUM 100 MG: 100 CAPSULE, LIQUID FILLED ORAL at 08:25

## 2019-04-28 RX ADMIN — HEPARIN SODIUM 5000 UNITS: 10000 INJECTION INTRAVENOUS; SUBCUTANEOUS at 06:48

## 2019-04-28 RX ADMIN — SODIUM CHLORIDE 1000 ML: 9 INJECTION, SOLUTION INTRAVENOUS at 00:36

## 2019-04-28 RX ADMIN — GABAPENTIN 300 MG: 300 CAPSULE ORAL at 13:47

## 2019-04-28 RX ADMIN — INSULIN LISPRO 3 UNITS: 100 INJECTION, SOLUTION INTRAVENOUS; SUBCUTANEOUS at 11:48

## 2019-04-28 RX ADMIN — GABAPENTIN 300 MG: 300 CAPSULE ORAL at 21:35

## 2019-04-28 RX ADMIN — SODIUM CHLORIDE: 4.5 INJECTION, SOLUTION INTRAVENOUS at 22:06

## 2019-04-28 RX ADMIN — CEFTRIAXONE SODIUM 1 G: 1 INJECTION, POWDER, FOR SOLUTION INTRAMUSCULAR; INTRAVENOUS at 04:11

## 2019-04-28 RX ADMIN — AZITHROMYCIN DIHYDRATE 500 MG: 500 INJECTION, POWDER, LYOPHILIZED, FOR SOLUTION INTRAVENOUS at 04:16

## 2019-04-28 RX ADMIN — INSULIN LISPRO 6 UNITS: 100 INJECTION, SOLUTION INTRAVENOUS; SUBCUTANEOUS at 06:46

## 2019-04-28 RX ADMIN — WARFARIN SODIUM 5 MG: 5 TABLET ORAL at 18:45

## 2019-04-28 RX ADMIN — HYDRALAZINE HYDROCHLORIDE 25 MG: 50 TABLET, FILM COATED ORAL at 22:07

## 2019-04-28 RX ADMIN — FERROUS SULFATE TAB 325 MG (65 MG ELEMENTAL FE) 325 MG: 325 (65 FE) TAB at 08:26

## 2019-04-28 RX ADMIN — HYDRALAZINE HYDROCHLORIDE 25 MG: 25 TABLET, FILM COATED ORAL at 13:47

## 2019-04-28 RX ADMIN — FERROUS SULFATE TAB 325 MG (65 MG ELEMENTAL FE) 325 MG: 325 (65 FE) TAB at 18:45

## 2019-04-28 RX ADMIN — GLIMEPIRIDE 2 MG: 2 TABLET ORAL at 08:26

## 2019-04-28 RX ADMIN — CALCIUM GLUCONATE 1 G: 98 INJECTION, SOLUTION INTRAVENOUS at 01:25

## 2019-04-28 RX ADMIN — GLIMEPIRIDE 2 MG: 2 TABLET ORAL at 18:45

## 2019-04-28 RX ADMIN — IPRATROPIUM BROMIDE AND ALBUTEROL SULFATE 3 AMPULE: .5; 3 SOLUTION RESPIRATORY (INHALATION) at 00:40

## 2019-04-28 ASSESSMENT — PAIN SCALES - GENERAL
PAINLEVEL_OUTOF10: 0

## 2019-04-28 ASSESSMENT — ENCOUNTER SYMPTOMS: SHORTNESS OF BREATH: 1

## 2019-04-28 NOTE — PLAN OF CARE
Problem: Falls - Risk of:  Goal: Will remain free from falls  Description  Will remain free from falls  Outcome: Met This Shift  Goal: Absence of physical injury  Description  Absence of physical injury  Outcome: Met This Shift     Problem: Breathing Pattern - Ineffective:  Goal: Ability to achieve and maintain a regular respiratory rate will improve  Description  Ability to achieve and maintain a regular respiratory rate will improve  Outcome: Met This Shift     Problem:  Activity:  Goal: Fatigue will decrease  Description  Fatigue will decrease  Outcome: Met This Shift     Problem: Coping:  Goal: Level of anxiety will decrease  Description  Level of anxiety will decrease  Outcome: Met This Shift     Problem: Respiratory:  Goal: Ability to maintain a clear airway will improve  Description  Ability to maintain a clear airway will improve  Outcome: Met This Shift

## 2019-04-28 NOTE — ED PROVIDER NOTES
RBC 3.11 (L) 3.50 - 5.50 E12/L    Hemoglobin 9.4 (L) 11.5 - 15.5 g/dL    Hematocrit 31.4 (L) 34.0 - 48.0 %    .0 (H) 80.0 - 99.9 fL    MCH 30.2 26.0 - 35.0 pg    MCHC 29.9 (L) 32.0 - 34.5 %    RDW 13.4 11.5 - 15.0 fL    Platelets 841 177 - 699 E9/L    MPV 10.8 7.0 - 12.0 fL   Basic Metabolic Panel   Result Value Ref Range    Sodium 144 132 - 146 mmol/L    Potassium 5.7 (H) 3.5 - 5.0 mmol/L    Chloride 112 (H) 98 - 107 mmol/L    CO2 20 (L) 22 - 29 mmol/L    Anion Gap 12 7 - 16 mmol/L    Glucose 186 (H) 74 - 99 mg/dL    BUN 44 (H) 8 - 23 mg/dL    CREATININE 3.2 (H) 0.5 - 1.0 mg/dL    GFR Non-African American 14 >=60 mL/min/1.73    GFR African American 17     Calcium 8.6 8.6 - 10.2 mg/dL   Troponin   Result Value Ref Range    Troponin 0.04 (H) 0.00 - 0.03 ng/mL   Brain Natriuretic Peptide   Result Value Ref Range    Pro-BNP 3,230 (H) 0 - 125 pg/mL   D-Dimer, Quantitative   Result Value Ref Range    D-Dimer, Quant 746 ng/mL DDU   Lactate, Sepsis   Result Value Ref Range    Lactic Acid, Sepsis 1.0 0.5 - 1.9 mmol/L       RADIOLOGY:  XR CHEST PORTABLE   Final Result      Findings are suggestive of bilateral pneumonia notable on the right. Clinical correlation recommended. Short-term follow-up could be   helpful for further evaluation. EKG: This EKG is signed and interpreted by me. Rate: 105  Rhythm: sinus  Interpretation: no acute ST or T wave morphology changes  Comparison: stable as compared to previous EKG (11/23/2018)    ------------------------- NURSING NOTES AND VITALS REVIEWED ---------------------------  Date / Time Roomed:  4/27/2019 10:49 PM  ED Bed Assignment:  21/21    The nursing notes within the ED encounter and vital signs as below have been reviewed.      Patient Vitals for the past 24 hrs:   BP Temp Temp src Pulse Resp SpO2 Height Weight   04/28/19 0016 (!) 160/71 -- -- 90 18 93 % -- --   04/27/19 2250 (!) 118/54 99.4 °F (37.4 °C) Oral 108 18 95 % 5' 1\" (1.549 m) 270 lb (122.5 kg) Oxygen Saturation Interpretation: Normal    ------------------------------------------ PROGRESS NOTES ------------------------------------------  Re-evaluation(s):  Time: 12:42 AM  Patients symptoms show no change  Repeat physical examination is not changed    Counseling:  I have spoken with the patient and discussed todays results, in addition to providing specific details for the plan of care and counseling regarding the diagnosis and prognosis. Their questions are answered at this time and they are agreeable with the plan of admission.    --------------------------------- ADDITIONAL PROVIDER NOTES ---------------------------------  Consultations:  Time: 12:37 AM. Spoke with Dr. Indu De. Discussed case. They will admit the patient. This patient's ED course included: a personal history and physicial examination, re-evaluation prior to disposition, multiple bedside re-evaluations, IV medications, cardiac monitoring and continuous pulse oximetry    This patient has remained hemodynamically stable during their ED course. Diagnosis:  1. Acute respiratory failure with hypoxia ([address]    2. Community acquired pneumonia, unspecified laterality    3. Chronic kidney disease, unspecified CKD stage    4. Hyperkalemia        Disposition:  Patient's disposition: Admit to telemetry  Patient's condition is stable.            Brett Todd DO  Resident  04/28/19 1978

## 2019-04-28 NOTE — H&P
[address]                  [address] [facility]  [address]                              HISTORY AND PHYSICAL    PATIENT NAME: Aure Velez                      :        1950  MED REC NO:   [id number]                            ROOM:       [id number]  ACCOUNT NO:   [de-identified]                           ADMIT DATE: 2019  PROVIDER:     Lesa Taveras MD    CHIEF COMPLAINT:  Shortness of breath, cough, and hypoxia. HISTORY OF PRESENT ILLNESS:  This is a 62-year-old with a history of  hypertension, chronic kidney disease, bilateral knee osteoarthritis,  remote history of sarcoidosis presents to the emergency room with  increasing shortness of breath and cough. The patient just returned  from a trip to the [place] [address] in [place]. She left on Friday and  returned on Monday. States the five-hour car ride both ways. She  denies any chest pain. Denies any fever. In the emergency room, she  was found to be hypoxic with evidence of possible pneumonia and also had  elevated D-dimer. Kidney function at that time, creatinine was 3.2  which I believe was somewhere around her baseline. At present, the  patient states she is feeling better. Once again denies any chest pain. Denies any fever. CURRENT MEDICATIONS:  See med rec in the chart. PAST MEDICAL HISTORY:  As described above. PAST SURGICAL HISTORY:  Included cholecystectomy,  sections,  foot surgery, cystoscopy. SOCIAL HISTORY:  Quit smoking in . She smoked a pack a day for 30  years up to that time. Denies alcohol use. FAMILY MEDICAL HISTORY:  Noncontributory. REVIEW OF SYSTEMS:  ALLERGIES:  None known. SKIN:  Negative. LYMPHATICS:  Negative. CENTRAL NERVOUS SYSTEM:  Denies headache, blurred vision, dizziness. CIRCULATORY:  Denies chest pain, but does complain of shortness of  breath. PULMONARY:  Complains of cough with shortness of breath.   DIGESTIVE:  Denies nausea, vomiting, diarrhea, melena, hematochezia. GENITOURINARY:  Denies dysuria, frequency, hematuria. MUSCULOSKELETAL:  Has severe osteoarthritis of both knees. PHYSICAL EXAMINATION:  GENERAL:  The patient presently lying in bed. Head of the bed is  elevated. VITAL SIGNS:  Temperature 97.6, pulse 82, respirations 16, pressure  138/59, O2 sats 93% on 2 liters. Weight is 279 pounds. SKIN:  Shows pallor, but no jaundice. EYES:  Reveal no icterus. NECK:  Supple without bruits or masses. CHEST:  Showed diminished breath sounds both bases. HEART:  Distant, but regular rate and rhythm. ABDOMEN:  Obese, soft, nontender at this time. LOWER EXTREMITIES:  Show chronic stasis changes, severe osteoarthritis  of both knees. Does have calf tenderness, but she states this is  chronic. LABORATORY DATA:  Creatinine 3.2, BUN 44. BNP 3230. Troponin 0.04. White count 22,500, hemoglobin 9.4, D-dimer 746. Respiratory viral  panel and influenza studies are all negative as are Legionella and strep  pneumoniae titers. Chest x-ray shows the clinical findings suggestive  of bilateral pneumonia. Notable on the right. IMPRESSION:  Hypoxia and cough, possible pneumonia based on chest x-ray,  also concerned about 10 hours in the car from Friday to Monday and  elevated D-dimer. Certainly, a DVT, PE should be considered and ruled  out. Chronic kidney disease which appears to be around her baseline at  this point. PLAN:  The patient was admitted to telemetry. Pulmonary will be  consulted. Procalcitonin will be done. V/Q scan, ultrasound of lower  extremities. DVT prophylaxis at this point. Ceftriaxone and  azithromycin. Also ask pharmacy to help with the dosing of this. DISCHARGE PLAN:  Possibly home versus other rehab once stable.         Sushma Chairez MD    D: 04/28/2019 9:41:47       T: 04/28/2019 9:43:16     [email or web address]  Job#: [id number]     Doc#: [id number]    CC:

## 2019-04-28 NOTE — ED NOTES
.Blood cultures obtained from right ac, per policy. Set one of two drawn at this time.              Arash Ulrich RN  04/27/19 0791

## 2019-04-28 NOTE — PROGRESS NOTES
Dr. Ofelia Couch on-call for Dr. Karoline Davis. Sent message via perfect serve to Dr. Ofelia Couch for nephrology consult.

## 2019-04-28 NOTE — ED NOTES
Bed: 21  Expected date:   Expected time:   Means of arrival:   Comments:  ems     Ethan Guillaume RN  04/27/19 2366

## 2019-04-28 NOTE — PROGRESS NOTES
Pharmacy Consultation Note  (Warfarin Dosing and Monitoring)    Initial consult date: 4/28/19  Consulting physician: Dr. Dario Molina    Allergies:  Patient has no known allergies. Ht Readings from Last 1 Encounters:   04/28/19 5' 1\" (1.549 m)     Wt Readings from Last 1 Encounters:   04/28/19 279 lb 15.8 oz (127 kg)         Warfarin Indication Target   INR Range Home Dose  (if applicable)   Diet/Feeding Tube   Pulmonary Embolism 2 - 3 N/A DIET RENAL;       DI Interaction Effect with warfarin Comment                      Vitamin K or Blood product  Administration Date                    TSH:    Lab Results   Component Value Date    TSH 1.945 08/16/2013        Hepatic Function Panel:                            Lab Results   Component Value Date    ALKPHOS 58 11/27/2018    ALT 9 11/27/2018    AST 12 11/27/2018    PROT 5.4 11/27/2018    BILITOT 0.3 11/27/2018    BILIDIR 0.8 06/25/2011    LABALBU 3.0 11/27/2018    LABALBU 4.1 10/12/2011       Date Warfarin Dose INR Heparin or LMWH HBG/HCT PLT Comment   4/28 5 mg  Heparin bridge to start -- --                                          Assessment:  · Milo Garza is a 70 y.o. female who initially presented for evaluation of right lower lobe community-acquired pneumonia and possible left upper lobe PE per notes. Pharmacy was consulted to dose/monitor warfarin and initiate heparin for bridging. The patient is a new start on warfarin and has a PMH significant for CKD and morbid obesity. · Patient was receiving heparin 5000 U TID (last dose around ~1400 today)  · Goal INR 2 - 3    Plan:  · Warfarin 5 mg tonight with heparin bridge (per the heparin nomogram/protocol). Spoke with patient's nurse and baseline labs will be obtained. · Daily PT/INR until the INR is stable within the therapeutic range  · Pharmacist will follow and monitor/adjust dosing as necessary    Thank you for this consult. Please page with questions.     Rehana Guerra, PharmD-PGY1 4/28/2019 6:18 PM Pager: [phone]

## 2019-04-28 NOTE — CONSULTS
Consults   [facility] Nephrology Consult       Patient's Name:  Sotero Ely     Date of Service:  2019     Requesting    Fatou Grier MD     Reason for Consult:             Elevated BUN/cr     Chief Complaint:\                 Cough Karen Budge     Information obtained from:Patient and chart     History of Present Illness: 70 yrs old WF     Known CKD stage 4- early 5 , baseline cr at 3.2--3.3 with a GFR at 15 ml/min , diabetic nephropathy   Follows up with DR Dian Adrian came in with above mentioned issues     Recent visit to daughters summer home in [facility], had a scratchy throat since which got worse   Since she came back about a week ago and hence came in the er     Work up suggested chest x ray - poly Pneumonia , leucocytosis , anemia high BNP  , elevated D dimer , Negative influenza ,hyperglycemia hyperkalemia , metabolic acidosis and bun cr at 44/3.2   Which are at baseline     She has been initiated on empiric AB , V/Q was intermediate prob , cultures so far negative         Past Medical History:   Diagnosis Date    Arthritis     knees    Chronic knee pain     Depression     Diabetes mellitus ([facility] [address] [facility]     type 2    Gall stones     Hypertension     Kidney disease     Kidney stones     Obesity     Sarcoidosis     SOBOE 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         Past Surgical History:   Procedure Laterality Date     SECTION  x3    CHOLECYSTECTOMY  3/31/16    Laparoscopic-Dr. Omer Proper    CYSTOSCOPY       for kidney stones    FOOT SURGERY  2009     right     UPPER GASTROINTESTINAL ENDOSCOPY  2.18.15    Dr. Karine Weiner, [facility] Findings: Mild Gastrits and Duodenitis, 2cm Hiatal Hernia         Social History     Socioeconomic History    Marital status:      Spouse name: Not on file    Number of children: Not on file    Years of education: Not on file    Highest education level: Not on file   Occupational History    Not on file   Social Needs    Financial resource strain: Not on file    Food insecurity:     Worry: Not on file     Inability: Not on file    Transportation needs:     Medical: Not on file     Non-medical: Not on file   Tobacco Use    Smoking status: Former Smoker     Packs/day: 1.00     Years: 30.00     Pack years: 30.00     Last attempt to quit: 2011     Years since quittin.7    Smokeless tobacco: Never Used   Substance and Sexual Activity    Alcohol use: No    Drug use: No    Sexual activity: Not on file   Lifestyle    Physical activity:     Days per week: Not on file     Minutes per session: Not on file    Stress: Not on file   Relationships    Social connections:     Talks on phone: Not on file     Gets together: Not on file     Attends [employer] service: Not on file     Active member of club or organization: Not on file     Attends meetings of clubs or organizations: Not on file     Relationship status: Not on file    Intimate partner violence:     Fear of current or ex partner: Not on file     Emotionally abused: Not on file     Physically abused: Not on file     Forced sexual activity: Not on file   Other Topics Concern    Not on file   Social History Narrative    Not on file       Family History  Family History   Problem Relation Age of Onset    Cancer Sister        Scheduled Meds:   dextrose  25 g Intravenous Once    [START ON 2019] pneumococcal 13-valent conjugate  0.5 mL Intramuscular Once    amLODIPine  10 mg Oral Daily    calcitRIOL  0.25 mcg Oral Daily    docusate sodium  100 mg Oral Daily    ferrous sulfate  325 mg Oral BID WC    gabapentin  300 mg Oral TID    glimepiride  2 mg Oral BID WC    hydrALAZINE  25 mg Oral TID    insulin glargine  10 Units Subcutaneous Nightly    heparin (porcine)  5,000 Units Subcutaneous Q8H    insulin lispro  0-18 Units Subcutaneous TID WC    insulin lispro  0-9 Units Subcutaneous Nightly    azithromycin  500 mg Intravenous Q24H    And  [START ON 4/29/2019] cefTRIAXone (ROCEPHIN) IV  1 g Intravenous Q24H    patiromer sorbitex calcium  8.4 g Oral Daily    sodium chloride flush  10 mL Intravenous 2 times per day       Continuous Infusions:  [unfilled]    PRN meds  HYDROcodone-acetaminophen, magnesium hydroxide, ondansetron, albuterol, acetaminophen, glucose, dextrose, glucagon (rDNA), dextrose, guaiFENesin-dextromethorphan, sodium chloride flush    Allergies:  Patient has no known allergies. Review of Systems     Pertinent positives and negatives as in HPI. Other systems reviewed and were negative. LAST 3 CMP  Recent Labs     04/27/19  2345      K 5.7*   *   CO2 20*   BUN 44*   CREATININE 3.2*   GLUCOSE 186*   CALCIUM 8.6       LAST 3 CBC:  Recent Labs     04/27/19  2345   WBC 22.5*   RBC 3.11*   HGB 9.4*   HCT 31.4*            Intake/Output Summary (Last 24 hours) at 4/28/2019 1610  Last data filed at 4/28/2019 1439  Gross per 24 hour   Intake 940 ml   Output 400 ml   Net 540 ml     Patient Vitals for the past 24 hrs:   BP Temp Temp src Pulse Resp SpO2 Height Weight   04/28/19 1530 (!) 129/58 97.8 °F (36.6 °C) Temporal 79 18 93 % -- --   04/28/19 0800 (!) 138/59 97.6 °F (36.4 °C) Temporal 82 16 93 % -- --   04/28/19 0200 (!) 154/73 98.2 °F (36.8 °C) Temporal 108 16 93 % 5' 1\" (1.549 m) 279 lb 15.8 oz (127 kg)   04/28/19 0140 128/64 99 °F (37.2 °C) Temporal 105 16 95 % -- --   04/28/19 0050 -- -- -- -- 15 97 % -- --   04/28/19 0045 -- -- -- -- 16 97 % -- --   04/28/19 0040 -- -- -- -- 18 93 % -- --   04/28/19 0016 (!) 160/71 -- -- 90 18 93 % -- --   04/27/19 2250 (!) 118/54 99.4 °F (37.4 °C) Oral 108 18 95 % 5' 1\" (1.549 m) 270 lb (122.5 kg)       General appearance:  Appears stated age  Skin: color, texture, turgor normal. No rashes or lesions. Neck: supple, no masses, no JVD, No carotid bruits, No thyromegaly  Lungs: respirations unlabored. Clear to auscultation. No rales, wheezes, no rhonchi.  Equal chest

## 2019-04-28 NOTE — PROGRESS NOTES
Pharmacy Consultation Note  (Antibiotic Dosing and Monitoring)    Initial consult date: 4/28/19  Consulting physician: Dr. Vonnie Tom  Drug(s): Ceftriaxone + Azithromycin  Indication: MAIKOL Olivas is a 70year old female with a past medical history significant for CKD, b/l knee osteoarthritisis, and sarcoidosis who presented for evaluation of SOB and cough on 4/27. Upon arrival, she was found to be hypoxic per notes, Tmax of 99.4F, HR of 108, RR 18,  had a sCr of 3.2 (baseline ~3.2), WBC of 22.5 with a neutrophililc dominance, and QTc of 528 ms. Pharmacy was consulted by Dr. Vonnie Tom to assist with dosing of Azithromycin and Ceftriaxone for 2/2 suspected respiratory infection. Age/  Gender Height Weight IBW Dosing weight  Allergy Information   68 y.o./female 61\" 127 kg Ideal body weight: 47.8 kg (105 lb 6.1 oz)  Adjusted ideal body weight: 79.5 kg (175 lb 3.5 oz)  N/A  Patient has no known allergies. Intake/Output Summary (Last 24 hours) at 4/28/2019 1047  Last data filed at 4/28/2019 3253  Gross per 24 hour   Intake 340 ml   Output 400 ml   Net -60 ml       Average urine output: 0.4 ml/kg/hr    Cultures:    Site Date Result   Blood     Legionella 4/28 Negative   Strep Pneumo 4/28 Negative   Rapid Flu 4/28 Negative   Respiratory Film Array 4/28 Negative     Radiology:  4/28: CXR read as Findings are suggestive of bilateral pneumonia notable on the right    Recommendatinos:    Current Anti-infectives Start date Recommendations   Ceftriaxone 1 g q24h 4/28 Dosing is appropriate for this patient   Azithromycin 500 mg IV q24h 4/28 Would recommend changing to Doxycycline 100 mg BID for Atypical coverage as this patients QTc is >500 ms. Azithromycin can further prolong the QTc          Other Recommendations:  · For patients taking gabapentin with a CrCl of 15 - 29 mL/min, it is recommended to dose 200 - 700 mg once daily.     Annette Garcia, PharmD-PGY1 4/28/2019 10:47 AM   Pager: [phone]

## 2019-04-29 ENCOUNTER — APPOINTMENT (OUTPATIENT)
Dept: ULTRASOUND IMAGING | Age: 69
DRG: 193 | End: 2019-04-29
Payer: MEDICARE

## 2019-04-29 PROBLEM — I26.99 ACUTE PULMONARY EMBOLISM (HCC): Status: ACTIVE | Noted: 2019-04-29

## 2019-04-29 LAB
ALBUMIN SERPL-MCNC: 2.8 G/DL (ref 3.5–5.2)
ALP BLD-CCNC: 70 U/L (ref 35–104)
ALT SERPL-CCNC: 7 U/L (ref 0–32)
ANION GAP SERPL CALCULATED.3IONS-SCNC: 13 MMOL/L (ref 7–16)
APTT: 193.9 SEC (ref 24.5–35.1)
APTT: 70.7 SEC (ref 24.5–35.1)
AST SERPL-CCNC: 11 U/L (ref 0–31)
BASOPHILS ABSOLUTE: 0.04 E9/L (ref 0–0.2)
BASOPHILS RELATIVE PERCENT: 0.3 % (ref 0–2)
BILIRUB SERPL-MCNC: <0.2 MG/DL (ref 0–1.2)
BUN BLDV-MCNC: 42 MG/DL (ref 8–23)
CALCIUM SERPL-MCNC: 7.9 MG/DL (ref 8.6–10.2)
CHLORIDE BLD-SCNC: 111 MMOL/L (ref 98–107)
CO2: 19 MMOL/L (ref 22–29)
CREAT SERPL-MCNC: 3.3 MG/DL (ref 0.5–1)
EOSINOPHILS ABSOLUTE: 0.29 E9/L (ref 0.05–0.5)
EOSINOPHILS RELATIVE PERCENT: 2 % (ref 0–6)
GFR AFRICAN AMERICAN: 17
GFR NON-AFRICAN AMERICAN: 14 ML/MIN/1.73
GLUCOSE BLD-MCNC: 58 MG/DL (ref 74–99)
HCT VFR BLD CALC: 25.7 % (ref 34–48)
HEMOGLOBIN: 7.9 G/DL (ref 11.5–15.5)
IMMATURE GRANULOCYTES #: 0.13 E9/L
IMMATURE GRANULOCYTES %: 0.9 % (ref 0–5)
INR BLD: 1
LV EF: 60 %
LVEF MODALITY: NORMAL
LYMPHOCYTES ABSOLUTE: 1.36 E9/L (ref 1.5–4)
LYMPHOCYTES RELATIVE PERCENT: 9.4 % (ref 20–42)
MCH RBC QN AUTO: 31.1 PG (ref 26–35)
MCHC RBC AUTO-ENTMCNC: 30.7 % (ref 32–34.5)
MCV RBC AUTO: 101.2 FL (ref 80–99.9)
METER GLUCOSE: 103 MG/DL (ref 74–99)
METER GLUCOSE: 132 MG/DL (ref 74–99)
METER GLUCOSE: 51 MG/DL (ref 74–99)
METER GLUCOSE: 52 MG/DL (ref 74–99)
METER GLUCOSE: 53 MG/DL (ref 74–99)
METER GLUCOSE: 59 MG/DL (ref 74–99)
METER GLUCOSE: 61 MG/DL (ref 74–99)
METER GLUCOSE: 64 MG/DL (ref 74–99)
METER GLUCOSE: 79 MG/DL (ref 74–99)
METER GLUCOSE: 87 MG/DL (ref 74–99)
METER GLUCOSE: <40 MG/DL (ref 74–99)
MONOCYTES ABSOLUTE: 1.19 E9/L (ref 0.1–0.95)
MONOCYTES RELATIVE PERCENT: 8.2 % (ref 2–12)
NEUTROPHILS ABSOLUTE: 11.48 E9/L (ref 1.8–7.3)
NEUTROPHILS RELATIVE PERCENT: 79.2 % (ref 43–80)
PDW BLD-RTO: 13.6 FL (ref 11.5–15)
PLATELET # BLD: 170 E9/L (ref 130–450)
PMV BLD AUTO: 11 FL (ref 7–12)
POTASSIUM REFLEX MAGNESIUM: 4.7 MMOL/L (ref 3.5–5)
PROTHROMBIN TIME: 11.8 SEC (ref 9.3–12.4)
RBC # BLD: 2.54 E12/L (ref 3.5–5.5)
SODIUM BLD-SCNC: 143 MMOL/L (ref 132–146)
TOTAL PROTEIN: 5.6 G/DL (ref 6.4–8.3)
WBC # BLD: 14.5 E9/L (ref 4.5–11.5)

## 2019-04-29 PROCEDURE — 85025 COMPLETE CBC W/AUTO DIFF WBC: CPT

## 2019-04-29 PROCEDURE — 2580000003 HC RX 258: Performed by: INTERNAL MEDICINE

## 2019-04-29 PROCEDURE — 6370000000 HC RX 637 (ALT 250 FOR IP): Performed by: FAMILY MEDICINE

## 2019-04-29 PROCEDURE — 85610 PROTHROMBIN TIME: CPT

## 2019-04-29 PROCEDURE — 6360000002 HC RX W HCPCS: Performed by: INTERNAL MEDICINE

## 2019-04-29 PROCEDURE — 93306 TTE W/DOPPLER COMPLETE: CPT

## 2019-04-29 PROCEDURE — 2580000003 HC RX 258: Performed by: FAMILY MEDICINE

## 2019-04-29 PROCEDURE — 2700000000 HC OXYGEN THERAPY PER DAY

## 2019-04-29 PROCEDURE — 80053 COMPREHEN METABOLIC PANEL: CPT

## 2019-04-29 PROCEDURE — 6370000000 HC RX 637 (ALT 250 FOR IP)

## 2019-04-29 PROCEDURE — 2060000000 HC ICU INTERMEDIATE R&B

## 2019-04-29 PROCEDURE — 6360000002 HC RX W HCPCS: Performed by: FAMILY MEDICINE

## 2019-04-29 PROCEDURE — 87206 SMEAR FLUORESCENT/ACID STAI: CPT

## 2019-04-29 PROCEDURE — 82962 GLUCOSE BLOOD TEST: CPT

## 2019-04-29 PROCEDURE — 6370000000 HC RX 637 (ALT 250 FOR IP): Performed by: NURSE PRACTITIONER

## 2019-04-29 PROCEDURE — 85730 THROMBOPLASTIN TIME PARTIAL: CPT

## 2019-04-29 PROCEDURE — 36415 COLL VENOUS BLD VENIPUNCTURE: CPT

## 2019-04-29 PROCEDURE — 93970 EXTREMITY STUDY: CPT

## 2019-04-29 PROCEDURE — 6370000000 HC RX 637 (ALT 250 FOR IP): Performed by: INTERNAL MEDICINE

## 2019-04-29 PROCEDURE — 87070 CULTURE OTHR SPECIMN AEROBIC: CPT

## 2019-04-29 RX ORDER — WARFARIN SODIUM 5 MG/1
5 TABLET ORAL
Status: COMPLETED | OUTPATIENT
Start: 2019-04-29 | End: 2019-04-29

## 2019-04-29 RX ORDER — GUAIFENESIN 400 MG/1
400 TABLET ORAL 3 TIMES DAILY
Status: DISCONTINUED | OUTPATIENT
Start: 2019-04-29 | End: 2019-05-03 | Stop reason: HOSPADM

## 2019-04-29 RX ORDER — FUROSEMIDE 10 MG/ML
20 INJECTION INTRAMUSCULAR; INTRAVENOUS ONCE
Status: COMPLETED | OUTPATIENT
Start: 2019-04-29 | End: 2019-04-29

## 2019-04-29 RX ORDER — DOXYCYCLINE HYCLATE 100 MG/1
100 CAPSULE ORAL EVERY 12 HOURS SCHEDULED
Status: DISCONTINUED | OUTPATIENT
Start: 2019-04-29 | End: 2019-04-30

## 2019-04-29 RX ADMIN — HEPARIN SODIUM 13.54 UNITS/KG/HR: 10000 INJECTION, SOLUTION INTRAVENOUS at 10:51

## 2019-04-29 RX ADMIN — PATIROMER 8.4 G: 8.4 POWDER, FOR SUSPENSION ORAL at 11:04

## 2019-04-29 RX ADMIN — DEXTROSE 15 G: 15 GEL ORAL at 06:55

## 2019-04-29 RX ADMIN — WARFARIN SODIUM 5 MG: 5 TABLET ORAL at 14:55

## 2019-04-29 RX ADMIN — DOXYCYCLINE HYCLATE 100 MG: 100 CAPSULE ORAL at 10:52

## 2019-04-29 RX ADMIN — DEXTROSE MONOHYDRATE 100 ML/HR: 50 INJECTION, SOLUTION INTRAVENOUS at 11:30

## 2019-04-29 RX ADMIN — FERROUS SULFATE TAB 325 MG (65 MG ELEMENTAL FE) 325 MG: 325 (65 FE) TAB at 16:54

## 2019-04-29 RX ADMIN — HYDRALAZINE HYDROCHLORIDE 25 MG: 50 TABLET, FILM COATED ORAL at 20:25

## 2019-04-29 RX ADMIN — FUROSEMIDE 20 MG: 10 INJECTION, SOLUTION INTRAMUSCULAR; INTRAVENOUS at 17:23

## 2019-04-29 RX ADMIN — DOCUSATE SODIUM 100 MG: 100 CAPSULE, LIQUID FILLED ORAL at 10:51

## 2019-04-29 RX ADMIN — DEXTROSE MONOHYDRATE 12.5 G: 500 INJECTION PARENTERAL at 11:13

## 2019-04-29 RX ADMIN — AMLODIPINE BESYLATE 10 MG: 10 TABLET ORAL at 10:51

## 2019-04-29 RX ADMIN — Medication 10 ML: at 17:25

## 2019-04-29 RX ADMIN — AZITHROMYCIN DIHYDRATE 500 MG: 500 INJECTION, POWDER, LYOPHILIZED, FOR SOLUTION INTRAVENOUS at 03:33

## 2019-04-29 RX ADMIN — DOXYCYCLINE HYCLATE 100 MG: 100 CAPSULE ORAL at 20:26

## 2019-04-29 RX ADMIN — FERROUS SULFATE TAB 325 MG (65 MG ELEMENTAL FE) 325 MG: 325 (65 FE) TAB at 11:00

## 2019-04-29 RX ADMIN — GUAIFENESIN 400 MG: 400 TABLET ORAL at 20:25

## 2019-04-29 RX ADMIN — GABAPENTIN 300 MG: 300 CAPSULE ORAL at 14:56

## 2019-04-29 RX ADMIN — GUAIFENESIN AND DEXTROMETHORPHAN 5 ML: 100; 10 SYRUP ORAL at 03:47

## 2019-04-29 RX ADMIN — GABAPENTIN 300 MG: 300 CAPSULE ORAL at 10:51

## 2019-04-29 RX ADMIN — HYDRALAZINE HYDROCHLORIDE 25 MG: 50 TABLET, FILM COATED ORAL at 10:51

## 2019-04-29 RX ADMIN — GUAIFENESIN 400 MG: 400 TABLET ORAL at 16:54

## 2019-04-29 RX ADMIN — CALCITRIOL 0.25 MCG: 0.25 CAPSULE ORAL at 11:02

## 2019-04-29 RX ADMIN — GUAIFENESIN AND DEXTROMETHORPHAN 5 ML: 100; 10 SYRUP ORAL at 10:52

## 2019-04-29 RX ADMIN — CEFTRIAXONE SODIUM 1 G: 1 INJECTION, POWDER, FOR SOLUTION INTRAMUSCULAR; INTRAVENOUS at 03:33

## 2019-04-29 RX ADMIN — GABAPENTIN 300 MG: 300 CAPSULE ORAL at 20:26

## 2019-04-29 RX ADMIN — DEXTROSE MONOHYDRATE 12.5 G: 500 INJECTION PARENTERAL at 23:34

## 2019-04-29 RX ADMIN — HYDRALAZINE HYDROCHLORIDE 25 MG: 50 TABLET, FILM COATED ORAL at 14:56

## 2019-04-29 ASSESSMENT — PAIN SCALES - GENERAL
PAINLEVEL_OUTOF10: 0

## 2019-04-29 NOTE — PROGRESS NOTES
Nutrition Education    Type and Reason for Visit: Consult, Patient Education     · Written educational materials provided for Coumadin and vitamin K foods. Reviewed foods to monitor and the importance of compliance. · Contact name and number provided. · Refer to Patient Education activity for more details.     Electronically signed by Douglas Cummings RD, LD on 4/29/19 at 2:14 PM    Contact Number:  Ext [phone]

## 2019-04-29 NOTE — PROGRESS NOTES
Physical Therapy    PT consult to evaluate/treat received and appreciated. Pt chart reviewed and evaluation attempted. Pt refused despite max education and encouragement. States she was just up to commode with assistance and is too fatigued to get OOB again. Will check back as able and as pt is willing to participate. Thank you.        Cheryl Wong, PT, DPT   [id number]

## 2019-04-29 NOTE — CONSULTS
[address]                  [address] [facility]  [facility]                                  CONSULTATION    PATIENT NAME: Karina Del Rosario                      :        1950  MED REC NO:   [id number]                            ROOM:       7414  ACCOUNT NO:   [de-identified]                           ADMIT DATE: 2019  PROVIDER:     May Avila DO    CONSULT DATE:  2019    PULMONARY CRITICAL CARE CONSULTATION    INDICATION FOR CONSULTATION:  The patient with right lower lobe  community-acquired pneumonia, possible left upper lobe pulmonary  embolism based on perfusion defect on V/Q, chronic kidney disease,  morbid obesity, history of stage I sarcoid. HISTORY OF PRESENT ILLNESS:  This pleasant 62-year-old woman reports  becoming ill on a recent car trip to [place]. She reports that she  developed a scratchy throat and then developed a moist cough. She  denies sputum production. She does report fever to 101.4. She does  report that some of the children on the trip had been ill with a similar  respiratory condition. In the course of her workup, chest x-ray  demonstrates consolidation at the right lung base consistent with  pneumonia. A V/Q scan was obtained as the patient has chronic kidney  disease. I reviewed the perfusion scan myself and compared it to the  chest film. There is a defect in the right lower lobe which corresponds  to the area of pneumonia. There is however a small perfusion defect in  the left upper lobe. This area on chest x-ray is clear raising the  possibility of a left upper lobe pulmonary embolism. The patient has  been given a single dose of Lovenox from the [facility]  [facility] prior to transport. She has been placed on appropriate  antibiotics for community-acquired pneumonia. The patient also reports a history of sarcoid based on radiographs that  would appear that she is stage I sarcoid. I asked about biopsy to prove  this. She said that she had undergone a lymph node biopsy in the  inguinal area by Dr. Zo Parsons in the past and this had demonstrated  sarcoid. ALLERGIES:  No known drug allergies. HOME MEDICATIONS:  Include Norco, heparin, Colace, iron tabs, Norvasc,  Amaryl, Rocaltrol, Neurontin, Apresoline, and Lantus insulin. PAST MEDICAL HISTORY:  Significant for chronic kidney disease,  hypertension, and diabetes mellitus. FAMILY HISTORY:  She reports both her mother and father lived into their  80s. Her mother did develop leukemia at age 80. REVIEW OF SYSTEMS:  NEURO:  The patient denies TIA, seizure, or stroke. CARDIAC:  Denies coronary artery disease or dysrhythmia. PULMONARY:  As  detailed above. GI:  Denies peptic ulcer disease, diverticular disease,  or reflux. She is status post cholecystectomy. :  Chronic kidney  disease, history of nephrolithiasis in the past.  ENDOCRINE:  History of  diabetes mellitus. She denies thyroid disease. PHYSICAL EXAMINATION:  VITAL SIGNS:  Pulse is 79, respiratory rate is 18, blood pressure  129/58, temperature is 97.8. GENERAL:  Morbidly obese female, currently no acute distress. NEURO:  Awake, alert, and oriented x3. Sensory and motor function  symmetrically intact. CARDIAC:  Heart is regular without S3, S4, or murmur. PULMONARY:  Auscultation of the lungs reveals few scattered rhonchi and  crackles at the right base. There is mild end-expiratory wheezing  noted. ABDOMEN:  Abdomen is soft. Positive bowel sounds. No guarding or  rebound is appreciated. There is no palpable organomegaly noted on  exam.  EXTREMITIES:  Lower extremities demonstrate some pitting edema. There  is no digital clubbing. Pulses are intact and symmetric. Rafy Gaw' sign  is negative bilaterally and there is no pain over Viral's canal  bilaterally.   LYMPHATICS:  There is no cervical, clavicular, or axillary adenopathy  noted on exam.  ENT:

## 2019-04-29 NOTE — PROGRESS NOTES
Rhys Peña M.D.,[facility]  Jayde Kraft D.O., F.A.C.O.I., Yasmine Collins M.D. Reginald Morgan M.D., Apollo Lauren M.D. Daily Pulmonary Progress Note    Patient:  Adwoa Méndez 70 y.o. female MRN: [id number]     Date of Service: 4/29/2019      Synopsis     We are following patient for RLL CAP, possible ALISON PE, stage I pulmonary sarcoid    \"CC\" cough    Code status: FULL      Subjective      Patient was seen and examined. Not feeling well this am due to low blood sugar levels. Denies dyspnea,  chest tightness, or wheeze. + moist cough, difficulty expectorating. Off oxygen at present. No fevers. No chest pain or hemoptysis. Continues on IV heparin infusion with nomogram. Transition to coumadin. INR 1.0 today. Review of Systems:  Constitutional:  +fatigue and weakness  Skin: Denies pigmentation, dark lesions, and rashes   HEENT: Denies hearing loss, tinnitus, ear drainage, epistaxis, sore throat, and hoarseness. Cardiovascular: Denies palpitations, chest pain, and chest pressure. Respiratory: Denies cough, dyspnea at rest, hemoptysis, apnea, and choking.   Gastrointestinal: Denies nausea, vomiting, poor appetite, diarrhea, heartburn or reflux  Genitourinary: Denies dysuria, frequency, urgency or hematuria  Musculoskeletal: Denies myalgias, muscle weakness, and bone pain  Neurological: Denies dizziness, vertigo, headache, and focal weakness  Psychological: Denies anxiety and depression  Endocrine: Denies heat intolerance and cold intolerance  Hematopoietic/Lymphatic: Denies bleeding problems and blood transfusions    24-hour events:  hypoglycemia    Objective   Vitals: BP (!) 166/72   Pulse 82   Temp 97.7 °F (36.5 °C) (Temporal)   Resp 20   Ht 5' 1\" (1.549 m)   Wt 287 lb 14.7 oz (130.6 kg)   SpO2 93%   BMI 54.40 kg/m²     I/O:    Intake/Output Summary (Last 24 hours) at 4/29/2019 1224  Last data filed at 4/29/2019 0700  Gross per 24 hour   Intake 2229.2 ml   Output 600 ml recommended. Short-term follow-up could be   helpful for further evaluation. VQ scan 4/28      Findings:    The patient received 29 millicuries of [id number].       Ventilation images reveal Normal ventilation throughout the lung   fields       The patient received 8 millicuries of technetium MAA.       Perfusion images reveal moderate segmental defects within the   posterior aspect of the left upper lobe and posterior aspect of the   right lower lobe. The heart is enlarged.           Impression       1. Intermediate probability for pulmonary embolism. 2. Cardiomegaly. Echo 4/29  Summary   Normal left ventricular systolic function.   Ejection fraction is visually estimated at 60%.  Mild concentric left ventricular hypertrophy.   Normal right ventricular size and function (TAPSE 2.6 cm).   Mild tricuspid regurgitation.   PASP is estimated at 55 mmHg. Labs:  Lab Results   Component Value Date    WBC 14.5 04/29/2019    HGB 7.9 04/29/2019    HCT 25.7 04/29/2019    .2 04/29/2019    MCH 31.1 04/29/2019    MCHC 30.7 04/29/2019    RDW 13.6 04/29/2019     04/29/2019    MPV 11.0 04/29/2019     Lab Results   Component Value Date     04/29/2019    K 4.7 04/29/2019     04/29/2019    CO2 19 04/29/2019    BUN 42 04/29/2019    CREATININE 3.3 04/29/2019    LABALBU 2.8 04/29/2019    LABALBU 4.1 10/12/2011    CALCIUM 7.9 04/29/2019    GFRAA 17 04/29/2019    LABGLOM 14 04/29/2019     Lab Results   Component Value Date    PROTIME 11.8 04/29/2019    PROTIME 15.3 06/25/2011    INR 1.0 04/29/2019     Recent Labs     04/27/19  2345   PROBNP 3,230*     No results for input(s): PROCAL in the last 72 hours. This SmartLink has not been configured with any valid records. Micro:  No results for input(s): CULTRESP in the last 72 hours. No results for input(s): LABGRAM in the last 72 hours. No results for input(s): LEGUR in the last 72 hours.   Recent Labs     04/28/19  0429   STREPNEUMAGU Presumptive NEGATIVE for Pneumococcal pneumonia, suggesting  no current or recent pneumococcal infection. Infection due  to S. pneumoniae cannot be ruled out since the antigen present  in the sample may be below the detection limit of the test.       Recent Labs     04/28/19  0429   LP1UAG Presumptive NEGATIVE suggesting no recent or current infections  with Legionella pneumophilia serogroup 1. Infection to  Legionella cannot be ruled out since other serogroups and  species may cause infection, antigen may not be present in  early infection, or level of antigen may be below the  detection limit. Assessment:    1. Right lower lobe community-acquired pneumonia. 2.  Possible left upper lobe pulmonary embolism as demonstrated by a  perfusion defect on V/Q. The perfusion defect that corresponds to the  right lower lobe is associated most likely with a right lower lobe  pneumonia. 3.  Chronic kidney disease. 4.  Morbid obesity. 5.  History of stage I sarcoid. Plan:   1. Oxygen therapy wean off to keep >92%  2. Antibiotics for CAP-rocephin, doxycycline , await final cultures  3. Echo with no RV strain   4. US BLE pending  5. IV heparin gtt with Transition to Coumadin. INR 1.0  6. Follow CXR serially  7. Add flutter valve, add mucinex TID to assist with mucus clearance   8. Outpatient PFTs    This plan of care was reviewed in collaboration with Dr. Katelynn Emanuel  Electronically signed by PABLO Amezcua CNP on 4/29/2019 at 12:24 PM    I personally saw, examined, and cared for the patient. Labs, medications, radiographs reviewed. I agree with history exam and plans detailed in NP note.   Parviz Mansfield MD

## 2019-04-29 NOTE — PROGRESS NOTES
 Arthritis    Kidney disease    Acute cholecystitis    Anemia due to GI blood loss    GI bleed    Knee problem    Displaced fracture of medial condyle of left femur, initial encounter for closed fracture (HCC)    Anemia due to stage 3 chronic kidney disease ([facility] [address]    Acute respiratory failure with hypoxia (HCC)    Pneumonia, bacterial    Acute pulmonary embolism ([facility] [address]       Plan:  Ultrasound lower extremities  Switch azithromycin to doxycycline per pharmacy's recommendation  PT/OT        Ed Bloom  9:02 AM  4/29/2019

## 2019-04-29 NOTE — PROGRESS NOTES
11:15 notified that the patient is not ready to come to ultrasound. Transport was sent away. This is our second attempt to complete the ultrasound. Patient refused yesterday 04/28.

## 2019-04-29 NOTE — CARE COORDINATION
SW met with patient on discharge planning/transition of care and SW role. Patient lives alone in a 2 story home with a couple steps to get into. Patient has her son and daughter visit her. Son lives 2 houses from her. PCP is Alexia Tao and pharmacy is [facility]. DME is a [employer] and has a glucose monitor she uses at home. Patient has used [facility] through [facility] in past.  [facility] ended 3/15/2019 and patient noted she would use them again. SNF for rehab has been [facility] in past.  Plan is home with family unsure of [facility] needed. Patient will have daughter bring her home once medically cleared. SW to follow.

## 2019-04-29 NOTE — PROGRESS NOTES
AM blood sugar was 52. Patient alert, oriented and tolerating PO fluids. Gave juice per hypoglycemia protocol. Rechecked blood sugar, new result 51. Patient still alert, oriented and tolerating PO fluids. Administered oral glucose gel per hypoglycemia protocol. Will reassess blood glucose. Blood sugar now 61.

## 2019-04-29 NOTE — PROGRESS NOTES
Patient stated she did not feel good and was feeling really hot. Checked patients blood sugar and accu-check read RRLO. Patient refused glucose gel. Gave 120 mL Orange Juice and PRN Dextrose 50% - 25 mg, started D5% @ 100. Rechecked patients BS 15 mins later and BS 87.  Patient is starting to feel better. Will reassess patient in 30 mins.

## 2019-04-29 NOTE — PROGRESS NOTES
[facility]  Nephrology Attending Progress Note  Court Paul.  Maynor Becker MD        SUBJECTIVE:     4/29: coughing, developing edema      PROBLEM LIST:    Patient Active Problem List   Diagnosis    Neurologic gait dysfunction    Cellulitis    Renal failure, acute on chronic ([address] [address]    Diabetes mellitus ([facility] [address]    Hypertension    Sarcoidosis    Arthritis    Kidney disease    Acute cholecystitis    Anemia due to GI blood loss    GI bleed    Knee problem    Displaced fracture of medial condyle of left femur, initial encounter for closed fracture ([facility] [address]    Anemia due to stage 3 chronic kidney disease (HCC)    Acute respiratory failure with hypoxia (HCC)    Pneumonia, bacterial    Acute pulmonary embolism (HCC)        PAST MEDICAL HISTORY:    Past Medical History:   Diagnosis Date    Arthritis     knees    Chronic knee pain     Depression     Diabetes mellitus (HCC)     type 2    Gall stones     Hypertension     Kidney disease     Kidney stones     Obesity     Sarcoidosis     SOBOE 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       DIET:    DIET RENAL;     PHYSICAL EXAM:     Patient Vitals for the past 24 hrs:   BP Temp Temp src Pulse Resp SpO2 Weight   04/29/19 0815 (!) 166/72 97.7 °F (36.5 °C) Temporal 82 20 93 % --   04/29/19 0523 -- -- -- -- -- -- 287 lb 14.7 oz (130.6 kg)   04/29/19 0008 139/63 97.5 °F (36.4 °C) Temporal 87 21 93 % --   04/28/19 2130 138/64 97.9 °F (36.6 °C) Temporal 82 19 93 % --   04/28/19 1530 (!) 129/58 97.8 °F (36.6 °C) Temporal 79 18 93 % --   @      Intake/Output Summary (Last 24 hours) at 4/29/2019 1424  Last data filed at 4/29/2019 1322  Gross per 24 hour   Intake 2349.2 ml   Output 600 ml   Net 1749.2 ml         Wt Readings from Last 3 Encounters:   04/29/19 287 lb 14.7 oz (130.6 kg)   11/23/18 275 lb (124.7 kg)   06/23/16 (!) 325 lb 14.4 oz (147.8 kg)       Constitutional:  Pt is in no acute distress  Head: normocephalic, --  11   BILITOT  --   --  <0.2   ALKPHOS  --   --  70       Lab Results   Component Value Date    LABALBU 2.8 (L) 04/29/2019    LABALBU 3.0 (L) 11/27/2018    LABALBU 2.7 (L) 11/26/2018     Lab Results   Component Value Date    TSH 1.945 08/16/2013     No results found for: PHART, PO2ART, [id number]    Iron Studies:   Lab Results   Component Value Date    IRON 31 (L) 11/27/2018    TIBC 183 (L) 11/27/2018    FERRITIN 139 11/27/2018         IMPRESSION/RECOMMENDATIONS:      1. Chronic kidney disease at stage 5 current GFR 14 cc/min/1.73 m2   diabetic  Nephropathy  renal function at baseline however associated Hyperkalemia and metab acidosis   She remains non oliguric  will initiate veltassa to normalize K  condiser bicarb pills if co2 any further lower  monitor her volume status and consider diuretics as needed  she has chronic  edema  Dc ivf, dose lasix    2. Poly pneumonia - possible post viral infection based on history , feels better on AB     3. Morbid Obesity , chronic edema and skin changes poly lower extremity      4. H/O sarcoid      5. Anemia of ckd check iron, replace if low and consider ARUN if HB drops further And once infection clears      6. Suspected PE as high DDimers and recent long car drive - V/Q intermediate prob , per ronn Schumacher.  Rd Matt MD

## 2019-04-30 LAB
APTT: 105.8 SEC (ref 24.5–35.1)
APTT: 77.2 SEC (ref 24.5–35.1)
EKG ATRIAL RATE: 105 BPM
EKG P AXIS: 80 DEGREES
EKG P-R INTERVAL: 200 MS
EKG Q-T INTERVAL: 400 MS
EKG QRS DURATION: 120 MS
EKG QTC CALCULATION (BAZETT): 528 MS
EKG R AXIS: -22 DEGREES
EKG T AXIS: 97 DEGREES
EKG VENTRICULAR RATE: 105 BPM
INR BLD: 1
METER GLUCOSE: 105 MG/DL (ref 74–99)
METER GLUCOSE: 200 MG/DL (ref 74–99)
METER GLUCOSE: 316 MG/DL (ref 74–99)
METER GLUCOSE: 58 MG/DL (ref 74–99)
METER GLUCOSE: 81 MG/DL (ref 74–99)
METER GLUCOSE: 89 MG/DL (ref 74–99)
PLATELET # BLD: 225 E9/L (ref 130–450)
PROTHROMBIN TIME: 11.8 SEC (ref 9.3–12.4)

## 2019-04-30 PROCEDURE — 6360000002 HC RX W HCPCS: Performed by: FAMILY MEDICINE

## 2019-04-30 PROCEDURE — 2700000000 HC OXYGEN THERAPY PER DAY

## 2019-04-30 PROCEDURE — 6360000002 HC RX W HCPCS: Performed by: INTERNAL MEDICINE

## 2019-04-30 PROCEDURE — 2580000003 HC RX 258: Performed by: INTERNAL MEDICINE

## 2019-04-30 PROCEDURE — 97530 THERAPEUTIC ACTIVITIES: CPT | Performed by: PHYSICAL THERAPIST

## 2019-04-30 PROCEDURE — 97161 PT EVAL LOW COMPLEX 20 MIN: CPT | Performed by: PHYSICAL THERAPIST

## 2019-04-30 PROCEDURE — 94640 AIRWAY INHALATION TREATMENT: CPT

## 2019-04-30 PROCEDURE — 85730 THROMBOPLASTIN TIME PARTIAL: CPT

## 2019-04-30 PROCEDURE — 2060000000 HC ICU INTERMEDIATE R&B

## 2019-04-30 PROCEDURE — 85610 PROTHROMBIN TIME: CPT

## 2019-04-30 PROCEDURE — 6370000000 HC RX 637 (ALT 250 FOR IP): Performed by: NURSE PRACTITIONER

## 2019-04-30 PROCEDURE — 82962 GLUCOSE BLOOD TEST: CPT

## 2019-04-30 PROCEDURE — 97530 THERAPEUTIC ACTIVITIES: CPT

## 2019-04-30 PROCEDURE — 6370000000 HC RX 637 (ALT 250 FOR IP): Performed by: INTERNAL MEDICINE

## 2019-04-30 PROCEDURE — 6370000000 HC RX 637 (ALT 250 FOR IP): Performed by: FAMILY MEDICINE

## 2019-04-30 PROCEDURE — 97165 OT EVAL LOW COMPLEX 30 MIN: CPT

## 2019-04-30 PROCEDURE — 36415 COLL VENOUS BLD VENIPUNCTURE: CPT

## 2019-04-30 PROCEDURE — 6370000000 HC RX 637 (ALT 250 FOR IP)

## 2019-04-30 PROCEDURE — 2580000003 HC RX 258: Performed by: FAMILY MEDICINE

## 2019-04-30 PROCEDURE — 85049 AUTOMATED PLATELET COUNT: CPT

## 2019-04-30 RX ORDER — FUROSEMIDE 10 MG/ML
20 INJECTION INTRAMUSCULAR; INTRAVENOUS ONCE
Status: COMPLETED | OUTPATIENT
Start: 2019-04-30 | End: 2019-04-30

## 2019-04-30 RX ORDER — WARFARIN SODIUM 5 MG/1
5 TABLET ORAL
Status: COMPLETED | OUTPATIENT
Start: 2019-04-30 | End: 2019-04-30

## 2019-04-30 RX ORDER — PREDNISONE 20 MG/1
20 TABLET ORAL DAILY
Status: DISCONTINUED | OUTPATIENT
Start: 2019-04-30 | End: 2019-05-03

## 2019-04-30 RX ORDER — ALBUTEROL SULFATE 2.5 MG/3ML
2.5 SOLUTION RESPIRATORY (INHALATION) EVERY 4 HOURS
Status: DISCONTINUED | OUTPATIENT
Start: 2019-04-30 | End: 2019-05-01

## 2019-04-30 RX ADMIN — HYDRALAZINE HYDROCHLORIDE 25 MG: 50 TABLET, FILM COATED ORAL at 14:31

## 2019-04-30 RX ADMIN — HEPARIN SODIUM 13.55 UNITS/KG/HR: 10000 INJECTION, SOLUTION INTRAVENOUS at 02:53

## 2019-04-30 RX ADMIN — GUAIFENESIN AND DEXTROMETHORPHAN 5 ML: 100; 10 SYRUP ORAL at 17:06

## 2019-04-30 RX ADMIN — PREDNISONE 20 MG: 20 TABLET ORAL at 17:06

## 2019-04-30 RX ADMIN — Medication 10 ML: at 04:49

## 2019-04-30 RX ADMIN — HYDROCODONE BITARTRATE AND ACETAMINOPHEN 1 TABLET: 5; 325 TABLET ORAL at 13:36

## 2019-04-30 RX ADMIN — GABAPENTIN 300 MG: 300 CAPSULE ORAL at 14:37

## 2019-04-30 RX ADMIN — GABAPENTIN 300 MG: 300 CAPSULE ORAL at 20:41

## 2019-04-30 RX ADMIN — GUAIFENESIN 400 MG: 400 TABLET ORAL at 09:24

## 2019-04-30 RX ADMIN — HYDRALAZINE HYDROCHLORIDE 25 MG: 50 TABLET, FILM COATED ORAL at 09:24

## 2019-04-30 RX ADMIN — FERROUS SULFATE TAB 325 MG (65 MG ELEMENTAL FE) 325 MG: 325 (65 FE) TAB at 17:06

## 2019-04-30 RX ADMIN — HYDROCODONE BITARTRATE AND ACETAMINOPHEN 1 TABLET: 5; 325 TABLET ORAL at 06:36

## 2019-04-30 RX ADMIN — ONDANSETRON 4 MG: 2 INJECTION INTRAMUSCULAR; INTRAVENOUS at 22:29

## 2019-04-30 RX ADMIN — HYDRALAZINE HYDROCHLORIDE 25 MG: 50 TABLET, FILM COATED ORAL at 20:41

## 2019-04-30 RX ADMIN — FERROUS SULFATE TAB 325 MG (65 MG ELEMENTAL FE) 325 MG: 325 (65 FE) TAB at 09:24

## 2019-04-30 RX ADMIN — DEXTROSE MONOHYDRATE 100 ML/HR: 50 INJECTION, SOLUTION INTRAVENOUS at 07:17

## 2019-04-30 RX ADMIN — GABAPENTIN 300 MG: 300 CAPSULE ORAL at 09:24

## 2019-04-30 RX ADMIN — Medication 10 ML: at 19:06

## 2019-04-30 RX ADMIN — PATIROMER 8.4 G: 8.4 POWDER, FOR SUSPENSION ORAL at 09:24

## 2019-04-30 RX ADMIN — HEPARIN SODIUM 11.55 UNITS/KG/HR: 10000 INJECTION, SOLUTION INTRAVENOUS at 22:32

## 2019-04-30 RX ADMIN — GUAIFENESIN 400 MG: 400 TABLET ORAL at 14:31

## 2019-04-30 RX ADMIN — WARFARIN SODIUM 5 MG: 5 TABLET ORAL at 14:51

## 2019-04-30 RX ADMIN — DOCUSATE SODIUM 100 MG: 100 CAPSULE, LIQUID FILLED ORAL at 09:24

## 2019-04-30 RX ADMIN — ALBUTEROL SULFATE 2.5 MG: 2.5 SOLUTION RESPIRATORY (INHALATION) at 17:00

## 2019-04-30 RX ADMIN — AMLODIPINE BESYLATE 10 MG: 10 TABLET ORAL at 09:24

## 2019-04-30 RX ADMIN — ONDANSETRON 4 MG: 2 INJECTION INTRAMUSCULAR; INTRAVENOUS at 00:03

## 2019-04-30 RX ADMIN — INSULIN LISPRO 6 UNITS: 100 INJECTION, SOLUTION INTRAVENOUS; SUBCUTANEOUS at 20:58

## 2019-04-30 RX ADMIN — FUROSEMIDE 20 MG: 10 INJECTION, SOLUTION INTRAMUSCULAR; INTRAVENOUS at 19:06

## 2019-04-30 RX ADMIN — GUAIFENESIN 400 MG: 400 TABLET ORAL at 21:16

## 2019-04-30 RX ADMIN — DOXYCYCLINE HYCLATE 100 MG: 100 CAPSULE ORAL at 09:24

## 2019-04-30 RX ADMIN — CALCITRIOL 0.25 MCG: 0.25 CAPSULE ORAL at 09:24

## 2019-04-30 RX ADMIN — CEFTRIAXONE SODIUM 1 G: 1 INJECTION, POWDER, FOR SOLUTION INTRAMUSCULAR; INTRAVENOUS at 04:49

## 2019-04-30 RX ADMIN — DEXTROSE MONOHYDRATE 100 ML/HR: 50 INJECTION, SOLUTION INTRAVENOUS at 13:36

## 2019-04-30 RX ADMIN — Medication 10 ML: at 09:24

## 2019-04-30 RX ADMIN — DEXTROSE MONOHYDRATE 12.5 G: 500 INJECTION PARENTERAL at 06:33

## 2019-04-30 RX ADMIN — HEPARIN SODIUM 11.55 UNITS/KG/HR: 10000 INJECTION, SOLUTION INTRAVENOUS at 09:24

## 2019-04-30 ASSESSMENT — PAIN DESCRIPTION - FREQUENCY
FREQUENCY: INTERMITTENT
FREQUENCY: INTERMITTENT

## 2019-04-30 ASSESSMENT — PAIN DESCRIPTION - ONSET
ONSET: GRADUAL
ONSET: ON-GOING

## 2019-04-30 ASSESSMENT — PAIN DESCRIPTION - DESCRIPTORS
DESCRIPTORS: ACHING;CONSTANT;DISCOMFORT
DESCRIPTORS: ACHING;DISCOMFORT;SORE

## 2019-04-30 ASSESSMENT — PAIN DESCRIPTION - PROGRESSION
CLINICAL_PROGRESSION: GRADUALLY IMPROVING
CLINICAL_PROGRESSION: NOT CHANGED

## 2019-04-30 ASSESSMENT — PAIN SCALES - GENERAL
PAINLEVEL_OUTOF10: 6
PAINLEVEL_OUTOF10: 2
PAINLEVEL_OUTOF10: 6

## 2019-04-30 ASSESSMENT — PAIN DESCRIPTION - ORIENTATION
ORIENTATION: LEFT
ORIENTATION: LEFT;RIGHT;UPPER;LOWER

## 2019-04-30 ASSESSMENT — PAIN DESCRIPTION - LOCATION
LOCATION: RIB CAGE
LOCATION: GENERALIZED

## 2019-04-30 ASSESSMENT — PAIN DESCRIPTION - PAIN TYPE
TYPE: ACUTE PAIN
TYPE: CHRONIC PAIN

## 2019-04-30 ASSESSMENT — PAIN - FUNCTIONAL ASSESSMENT
PAIN_FUNCTIONAL_ASSESSMENT: ACTIVITIES ARE NOT PREVENTED
PAIN_FUNCTIONAL_ASSESSMENT: ACTIVITIES ARE NOT PREVENTED

## 2019-04-30 NOTE — PROGRESS NOTES
Occupational Therapy  OCCUPATIONAL THERAPY INITIAL EVALUATION      Date:2019  Patient Name: Sandeep Reyez  MRN: [id number]  : 1950  Room: [address] [place] [address]        Evaluating OT: Kimberlee Wong OTR/L [id number]    AM-PAC Daily Activity Raw Score:     Recommended Adaptive Equipment: TBD     Diagnosis: Acute Respiratory with hypoxia     Pertinent Medical History: arthritis, chronic knee pain, SOB,    Precautions:  Falls,      Home Living: Pt lives alone, 2 story with bed/bath on 2nd, 1/2 bath on 1st.  2-3 steps/grab bar to enter. Tub/shower unit with bench. Prior Level of Function: Independent  with ADLs , Independent  with IADLs; ambulated walker   Driving: yes    Pain Level: B knee pain ;   Cognition:  Ox3, alert and conversing     Judgement/safety:  fair     Functional Assessment:   Initial Eval Status  Date: 19 Tx Session  Date:  Short Term Goals  Treatment frequency: PRN   Feeding Set-up      Grooming SBA/set-up ,seated   Mod I at standing level   UB Dressing Mod A   Mod I    LB Dressing Max A  Total assist to don/doff socks   Mod I    Bathing Max A      Toileting Assist with hygiene      Bed Mobility  Min A  Supine to sit using bed rail      Functional Transfers Min A  Sit-stand from bed     Mod I    Functional Mobility Min A w/walker   Steps next to bed   Mod I with good tolerance    Balance Sitting:     Static:  Independent     Standing: Min A     Activity Tolerance Fair-  SOB with activity   Good with ADL activity    Visual/  Perceptual Glasses: reading                    Strength ROM Additional Info:    RUE  3+/5  WFL good  and wfl FMC/dexterity noted during ADL tasks       LUE 3+/5  WFL good  and wfl FMC/dexterity noted during ADL tasks         Hearing: [facility]   Sensation:  No c/o numbness or tingling                             Comments/Treatment: Upon entry patient soiled in bed, incontinent of bowel. Assisted patient with hygiene and clean linens.    Mod A to roll, total assist for

## 2019-04-30 NOTE — PROGRESS NOTES
[facility]  Nephrology Attending Progress Note  Mary Kate Larsen.  Tali Snider MD        SUBJECTIVE:     4/29: coughing, developing edema  4/30: continues with coughing      PROBLEM LIST:    Patient Active Problem List   Diagnosis    Neurologic gait dysfunction    Cellulitis    Renal failure, acute on chronic (HCC)    Diabetes mellitus ([facility] [address]    Hypertension    Sarcoidosis    Arthritis    Kidney disease    Acute cholecystitis    Anemia due to GI blood loss    GI bleed    Knee problem    Displaced fracture of medial condyle of left femur, initial encounter for closed fracture (HCC)    Anemia due to stage 3 chronic kidney disease (HCC)    Acute respiratory failure with hypoxia (HCC)    Pneumonia, bacterial    Acute pulmonary embolism (HCC)        PAST MEDICAL HISTORY:    Past Medical History:   Diagnosis Date    Arthritis     knees    Chronic knee pain     Depression     Diabetes mellitus (HCC)     type 2    Gall stones     Hypertension     Kidney disease     Kidney stones     Obesity     Sarcoidosis     SOBOE 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       DIET:    DIET RENAL;     PHYSICAL EXAM:     Patient Vitals for the past 24 hrs:   BP Temp Temp src Pulse Resp SpO2 Weight   04/30/19 1530 (!) 152/66 98.1 °F (36.7 °C) Temporal 79 18 93 % --   04/30/19 0800 139/66 97 °F (36.1 °C) Temporal 89 18 96 % --   04/30/19 0623 -- -- -- -- -- -- 285 lb 3.2 oz (129.4 kg)   04/29/19 2351 (!) 170/78 97.9 °F (36.6 °C) Temporal 98 19 92 % --   04/29/19 2015 (!) 198/84 97.8 °F (36.6 °C) Temporal 87 18 93 % --   @      Intake/Output Summary (Last 24 hours) at 4/30/2019 1539  Last data filed at 4/30/2019 1425  Gross per 24 hour   Intake 704.63 ml   Output 1200 ml   Net -495.37 ml         Wt Readings from Last 3 Encounters:   04/30/19 285 lb 3.2 oz (129.4 kg)   11/23/18 275 lb (124.7 kg)   06/23/16 (!) 325 lb 14.4 oz (147.8 kg)       Constitutional:  Pt is in no acute distress  Head: normocephalic, atraumatic  Neck: no JVD  Cardiovascular: regular rate and rhythm, no murmurs, gallops, or rubs  Respiratory:  No rales, rhochi, or wheezes  Gastrointestinal:  Soft, nontender, nondistended, bowel sounds x 4  Ext: tr edema  Skin: dry, no rash  Neuro: aaox3    MEDS (scheduled):    guaiFENesin  400 mg Oral TID    dextrose  25 g Intravenous Once    pneumococcal 13-valent conjugate  0.5 mL Intramuscular Once    amLODIPine  10 mg Oral Daily    calcitRIOL  0.25 mcg Oral Daily    docusate sodium  100 mg Oral Daily    ferrous sulfate  325 mg Oral BID WC    gabapentin  300 mg Oral TID    insulin lispro  0-18 Units Subcutaneous TID WC    insulin lispro  0-9 Units Subcutaneous Nightly    cefTRIAXone (ROCEPHIN) IV  1 g Intravenous Q24H    patiromer sorbitex calcium  8.4 g Oral Daily    sodium chloride flush  10 mL Intravenous 2 times per day    warfarin (COUMADIN) daily dosing (placeholder)   Other RX Placeholder    hydrALAZINE  25 mg Oral TID       MEDS (infusions):   dextrose 100 mL/hr (04/30/19 1336)    heparin (porcine) 11.55 Units/kg/hr (04/30/19 0924)       MEDS (prn):   HYDROcodone-acetaminophen, magnesium hydroxide, ondansetron, albuterol, acetaminophen, glucose, dextrose, glucagon (rDNA), dextrose, guaiFENesin-dextromethorphan, sodium chloride flush, perflutren lipid microspheres, heparin (porcine), heparin (porcine)    DATA:    Recent Labs     04/27/19  2345 04/28/19  1851 04/29/19  0729 04/30/19  0802   WBC 22.5* 18.7* 14.5*  --    HGB 9.4* 8.4* 7.9*  --    HCT 31.4* 27.6* 25.7*  --    .0* 101.5* 101.2*  --     196 170 225     Recent Labs     04/27/19  2345 04/28/19  1851 04/29/19  0729    145 143   K 5.7* 4.7 4.7   * 113* 111*   CO2 20* 23 19*   BUN 44* 43* 42*   CREATININE 3.2* 3.4* 3.3*   LABGLOM 14 13 14   GLUCOSE 186* 96 58*   CALCIUM 8.6 8.5* 7.9*   ALT  --   --  7   AST  --   --  11   BILITOT  --   --  <0.2   ALKPHOS  --   --  70 Lab Results   Component Value Date    LABALBU 2.8 (L) 04/29/2019    LABALBU 3.0 (L) 11/27/2018    LABALBU 2.7 (L) 11/26/2018     Lab Results   Component Value Date    TSH 1.945 08/16/2013     No results found for: PHART, PO2ART, [id number]    Iron Studies:   Lab Results   Component Value Date    IRON 31 (L) 11/27/2018    TIBC 183 (L) 11/27/2018    FERRITIN 139 11/27/2018         IMPRESSION/RECOMMENDATIONS:      1. Chronic kidney disease at stage 5 current GFR 14 cc/min/1.73 m2   diabetic  Nephropathy  renal function at baseline however associated Hyperkalemia and metab acidosis   She remains non oliguric  will initiate veltassa to normalize K  condiser bicarb pills if co2 any further lower  monitor her volume status and consider diuretics as needed  she has chronic  edema  Dc ivf, dose lasix yesterday    2. Poly pneumonia - possible post viral infection based on history , feels better on AB     3. Morbid Obesity , chronic edema and skin changes poly lower extremity      4. H/O sarcoid      5. Anemia of ckd check iron, replace if low and consider ARUN if HB drops further And once infection clears      6. Suspected PE as high DDimers and recent long car drive - V/Q intermediate prob , per ronn Vargas.  Saumya Mclain MD

## 2019-04-30 NOTE — PROGRESS NOTES
Subjective: The patient is awake and alert. Complains of nausea. Denies chest pain, angina, and dyspnea. Denies abdominal pain. Tolerating diet. No nausea or vomiting. Objective:    /66   Pulse 89   Temp 97 °F (36.1 °C) (Temporal)   Resp 18   Ht 5' 1\" (1.549 m)   Wt 285 lb 3.2 oz (129.4 kg)   SpO2 96%   BMI 53.89 kg/m²     Heart:  RRR, no murmurs, gallops, or rubs. Lungs:  CTA bilaterally, no wheeze, rales or rhonchi  Abd: bowel sounds present, nontender, nondistended, no masses  Extrem:  No clubbing, cyanosis, or edema    CBC with Differential:    Lab Results   Component Value Date    WBC 14.5 04/29/2019    RBC 2.54 04/29/2019    HGB 7.9 04/29/2019    HCT 25.7 04/29/2019     04/30/2019    .2 04/29/2019    MCH 31.1 04/29/2019    MCHC 30.7 04/29/2019    RDW 13.6 04/29/2019    SEGSPCT 71 08/16/2013    BANDSPCT 1 03/29/2016    METASPCT 1 07/03/2011    LYMPHOPCT 9.4 04/29/2019    MONOPCT 8.2 04/29/2019    MYELOPCT 1 06/26/2011    BASOPCT 0.3 04/29/2019    MONOSABS 1.19 04/29/2019    LYMPHSABS 1.36 04/29/2019    EOSABS 0.29 04/29/2019    BASOSABS 0.04 04/29/2019     CMP:    Lab Results   Component Value Date     04/29/2019    K 4.7 04/29/2019     04/29/2019    CO2 19 04/29/2019    BUN 42 04/29/2019    CREATININE 3.3 04/29/2019    GFRAA 17 04/29/2019    LABGLOM 14 04/29/2019    GLUCOSE 58 04/29/2019    GLUCOSE 149 10/12/2011    PROT 5.6 04/29/2019    LABALBU 2.8 04/29/2019    LABALBU 4.1 10/12/2011    CALCIUM 7.9 04/29/2019    BILITOT <0.2 04/29/2019    ALKPHOS 70 04/29/2019    AST 11 04/29/2019    ALT 7 04/29/2019    2-D echo unremarkable  Ultrasound of the legs negative for DVT  Blood sugar still low.     Assessment:    Patient Active Problem List   Diagnosis    Neurologic gait dysfunction    Cellulitis    Renal failure, acute on chronic (HCC)    [facility] mellitus ([facility] [address]    Hypertension    Sarcoidosis    Arthritis    Kidney disease    Acute cholecystitis    Anemia due to GI blood loss    GI bleed    Knee problem    Displaced fracture of medial condyle of left femur, initial encounter for closed fracture (HCC)    Anemia due to stage 3 chronic kidney disease ([facility] [address]    Acute respiratory failure with hypoxia (HCC)    Pneumonia, bacterial    Acute pulmonary embolism ([facility] [address]       Plan:   Amaryl and Lantus have been discontinued secondary to hypoglycemia  Continue anticoagulation  Monitor INR  Treat nausea  Increase activity  DC doxycycline secondary to complaints of nausea      Miguelitoia Krissy  12:48 PM  4/30/2019

## 2019-04-30 NOTE — PROGRESS NOTES
Patient was feeling a little nauseous and shaky, checked her blood sugar, result was 53. Offered patient juice and snack patient refused. Attempted to administer glucose gel, patient refused, stated she didn't think she could eat or drink anything sweet. Administered 50% dextrose IV 12.5 g per hypoglycemia protocol. Rechecked blood sugar 15 mins later, new result was 103. Will continue to monitor.

## 2019-04-30 NOTE — PROGRESS NOTES
I/O:    Intake/Output Summary (Last 24 hours) at 4/30/2019 1014  Last data filed at 4/30/2019 2765  Gross per 24 hour   Intake 644.63 ml   Output 1200 ml   Net -555.37 ml       CURRENT MEDS :  Scheduled Meds:   doxycycline hyclate  100 mg Oral 2 times per day    guaiFENesin  400 mg Oral TID    dextrose  25 g Intravenous Once    pneumococcal 13-valent conjugate  0.5 mL Intramuscular Once    amLODIPine  10 mg Oral Daily    calcitRIOL  0.25 mcg Oral Daily    docusate sodium  100 mg Oral Daily    ferrous sulfate  325 mg Oral BID WC    gabapentin  300 mg Oral TID    insulin lispro  0-18 Units Subcutaneous TID WC    insulin lispro  0-9 Units Subcutaneous Nightly    cefTRIAXone (ROCEPHIN) IV  1 g Intravenous Q24H    patiromer sorbitex calcium  8.4 g Oral Daily    sodium chloride flush  10 mL Intravenous 2 times per day    warfarin (COUMADIN) daily dosing (placeholder)   Other RX Placeholder    hydrALAZINE  25 mg Oral TID       Physical Exam:  General Appearance: appears comfortable in no acute distress. HEENT: Normocephalic atraumatic without obvious abnormality   Neck: Lips, mucosa, and tongue normal.  Supple, symmetrical, trachea midline, no adenopathy;thyroid:  no enlargement/tenderness/nodules or JVD. Lung: Breath sounds few rhonchi. Respirations   unlabored. Symmetrical expansion. Heart: RRR, normal S1, S2. No MRG  Abdomen: Soft, NT, ND. BS present x 4 quadrants. No bruit or organomegaly. Extremities: Pedal pulses 2+ symmetric b/l. Extremities normal, no cyanosis, clubbing, or edema. Musculokeletal: No joint swelling, no muscle tenderness. ROM normal in all joints of extremities. Neurologic: Mental status: Alert and Oriented X3 . Pertinent/ New Labs and Imaging Studies     Imaging Personally Reviewed:  4/27/19 CXR       FINDINGS:       Opacities are seen at right and left infrahilar locations. No obvious   pleural effusion. The heart is normal in size. No evidence of   pneumothorax.         Impression       Findings are suggestive of bilateral pneumonia notable on the right. Clinical correlation recommended. Short-term follow-up could be   helpful for further evaluation. VQ scan 4/28      Findings:    The patient received 29 millicuries of [id number].       Ventilation images reveal Normal ventilation throughout the lung   fields       The patient received 8 millicuries of technetium MAA.       Perfusion images reveal moderate segmental defects within the   posterior aspect of the left upper lobe and posterior aspect of the   right lower lobe. The heart is enlarged.           Impression       1. Intermediate probability for pulmonary embolism. 2. Cardiomegaly. Echo 4/29  Summary   Normal left ventricular systolic function.   Ejection fraction is visually estimated at 60%.  Mild concentric left ventricular hypertrophy.   Normal right ventricular size and function (TAPSE 2.6 cm).   Mild tricuspid regurgitation.   PASP is estimated at 55 mmHg. BLE dopplers 4/29/19  Negative DVT     Labs:  Lab Results   Component Value Date    WBC 14.5 04/29/2019    HGB 7.9 04/29/2019    HCT 25.7 04/29/2019    .2 04/29/2019    MCH 31.1 04/29/2019    MCHC 30.7 04/29/2019    RDW 13.6 04/29/2019     04/30/2019    MPV 11.0 04/29/2019     Lab Results   Component Value Date     04/29/2019    K 4.7 04/29/2019     04/29/2019    CO2 19 04/29/2019    BUN 42 04/29/2019    CREATININE 3.3 04/29/2019    LABALBU 2.8 04/29/2019    LABALBU 4.1 10/12/2011    CALCIUM 7.9 04/29/2019    GFRAA 17 04/29/2019    LABGLOM 14 04/29/2019     Lab Results   Component Value Date    PROTIME 11.8 04/30/2019    PROTIME 15.3 06/25/2011    INR 1.0 04/30/2019     Recent Labs     04/27/19  2345   PROBNP 3,230*     No results for input(s): PROCAL in the last 72 hours. This SmartLink has not been configured with any valid records. Micro:  No results for input(s): CULTRESP in the last 72 hours.   No results for input(s): LABGRAM in the last 72 hours. No results for input(s): LEGUR in the last 72 hours. Recent Labs     04/28/19  0429   STREPNEUMAGU Presumptive NEGATIVE for Pneumococcal pneumonia, suggesting  no current or recent pneumococcal infection. Infection due  to S. pneumoniae cannot be ruled out since the antigen present  in the sample may be below the detection limit of the test.       Recent Labs     04/28/19  0429   LP1UAG Presumptive NEGATIVE suggesting no recent or current infections  with Legionella pneumophilia serogroup 1. Infection to  Legionella cannot be ruled out since other serogroups and  species may cause infection, antigen may not be present in  early infection, or level of antigen may be below the  detection limit. 4/29/19 Respiratory culture  Group 5: >25 PMN's/LPF and <10 Epithelial cells/LPF   Abundant Polymorphonuclear leukocytes   Epithelial cells not seen   Rare Gram positive cocci      Assessment:    1. Right lower lobe community-acquired pneumonia. 2.  Possible left upper lobe pulmonary embolism as demonstrated by a  perfusion defect on V/Q. The perfusion defect that corresponds to the  right lower lobe is associated most likely with a right lower lobe  pneumonia. 3.  Chronic kidney disease. 4.  Morbid obesity. 5.  History of stage I sarcoid. Plan:   1. Oxygen therapy wean off to keep >92%  2. Check ambulatory O2 testing prior to dc home  3. Antibiotics for CAP-rocephin, doxycycline , await final cultures  4. Echo with no RV strain   5. US BLE negative for dvt  6. IV heparin gtt with Transition to Coumadin. INR 1.0 dosing per pharmacy  7. Follow CXR serially  8. Continue flutter valve, add mucinex TID to assist with mucus clearance  9. Blood glucose management per primary team   10. Renal following for mgmt of CKD  11.  Outpatient PFTs    This plan of care was reviewed in collaboration with Dr. Connor Tao  Electronically signed by PABLO Johnson - ANDREW on 4/30/2019 at 10:14 AM    Addendum; Patient has significant bilateral wheezing on exam. Will add albuterol and also predniosne 40 mg daily. I personally saw, examined, and cared for the patient. Labs, medications, radiographs reviewed. I agree with history exam and plans detailed in NP note.   Marcus Reyes MD

## 2019-04-30 NOTE — PROGRESS NOTES
prognosis, and plan of care. PLAN  PT care will be provided in accordance with the objectives noted above. Whenever appropriate, clear delegation orders will be provided for nursing staff. Exercises and functional mobility practice will be used as well as appropriate assistive devices or modalities to obtain goals. Patient and family education will also be administered as needed. Frequency of treatments will be 2-5x/week x 4-6 days.     Time in: 1113  Time out: 1148  Evaluation + 23  minutes tx time    Justyn Melendez PT, DPT   License number:  [id number]

## 2019-04-30 NOTE — PROGRESS NOTES
Pharmacy Consultation Note  (Warfarin Dosing and Monitoring)    Initial consult date: 4/28/19  Consulting physician: Dr. Esme Wells    Allergies:  Patient has no known allergies. Warfarin Indication Target   INR Range Home Dose  (if applicable)   Diet/Feeding Tube   Pulmonary Embolism 2 - 3 N/A 4/28: renal     Warfarin Drug Interactions Start Stop Comment   doxycycline 4/29  may increase INR          TSH: 1.945 (8-)   Hepatic Function Panel:                            Lab Results   Component Value Date    ALKPHOS 70 04/29/2019    ALT 7 04/29/2019    AST 11 04/29/2019    PROT 5.6 04/29/2019    BILITOT <0.2 04/29/2019    BILIDIR 0.8 06/25/2011    LABALBU 2.8 04/29/2019    LABALBU 4.1 10/12/2011     Date Warfarin Dose INR Heparin or LMWH HBG/HCT PLT Comment   4/28 5 mg 1.1 Heparin bridge to start 8.4/27.6 196    4/29 5 mg 1 UFH infusion 7.9/25.7 170    4/30 5 mg 1 UFH infusion  225                        Assessment:  · Marisela Bazan is a 68 y.o./F; 154.9 cm, 130.6 kg who initially presented for evaluation of RLL CAP and possible ALISON PE.    · Pharmacy was consulted to dose/monitor warfarin and initiate heparin for bridging. The patient is a new start on warfarin and has a PMH significant for CKD and morbid obesity. · 4/29: INR = 1; ATB change today from azithromycin (d/t long QTc) to doxycycline. · Significant drug-drug interaction between warfarin and doxycyline. · 4/30: INR = 1; day #3 overlap warfarin-UFH infusion. Plan:  · Give warfarin 5 mg x1 again today (dose #3). · Will increase warfarin dose cautiously d/t DDI with doxycycline. May increase soon if no change in INR. · Daily PT/INR until the INR is stable within the therapeutic range. · Pharmacist will follow and monitor/adjust dosing as necessary.     Chandu Guo, PharmD  4/30/2019  12:05 PM  Pager: [phone]

## 2019-05-01 LAB
APTT: 65.1 SEC (ref 24.5–35.1)
CULTURE, RESPIRATORY: NORMAL
INR BLD: 1.2
METER GLUCOSE: 183 MG/DL (ref 74–99)
METER GLUCOSE: 276 MG/DL (ref 74–99)
METER GLUCOSE: 278 MG/DL (ref 74–99)
METER GLUCOSE: 289 MG/DL (ref 74–99)
METER GLUCOSE: 350 MG/DL (ref 74–99)
PROTHROMBIN TIME: 14.3 SEC (ref 9.3–12.4)
REASON FOR REJECTION: NORMAL
REJECTED TEST: NORMAL
SMEAR, RESPIRATORY: NORMAL

## 2019-05-01 PROCEDURE — 6370000000 HC RX 637 (ALT 250 FOR IP): Performed by: FAMILY MEDICINE

## 2019-05-01 PROCEDURE — 6360000002 HC RX W HCPCS: Performed by: FAMILY MEDICINE

## 2019-05-01 PROCEDURE — 6360000002 HC RX W HCPCS: Performed by: INTERNAL MEDICINE

## 2019-05-01 PROCEDURE — 2060000000 HC ICU INTERMEDIATE R&B

## 2019-05-01 PROCEDURE — 6370000000 HC RX 637 (ALT 250 FOR IP): Performed by: NURSE PRACTITIONER

## 2019-05-01 PROCEDURE — 85730 THROMBOPLASTIN TIME PARTIAL: CPT

## 2019-05-01 PROCEDURE — 6370000000 HC RX 637 (ALT 250 FOR IP)

## 2019-05-01 PROCEDURE — 36415 COLL VENOUS BLD VENIPUNCTURE: CPT

## 2019-05-01 PROCEDURE — 6370000000 HC RX 637 (ALT 250 FOR IP): Performed by: INTERNAL MEDICINE

## 2019-05-01 PROCEDURE — 85610 PROTHROMBIN TIME: CPT

## 2019-05-01 PROCEDURE — 2580000003 HC RX 258: Performed by: FAMILY MEDICINE

## 2019-05-01 PROCEDURE — 82962 GLUCOSE BLOOD TEST: CPT

## 2019-05-01 PROCEDURE — 2580000003 HC RX 258: Performed by: INTERNAL MEDICINE

## 2019-05-01 RX ORDER — ALBUTEROL SULFATE 2.5 MG/3ML
2.5 SOLUTION RESPIRATORY (INHALATION) EVERY 4 HOURS PRN
Status: DISCONTINUED | OUTPATIENT
Start: 2019-05-01 | End: 2019-05-02

## 2019-05-01 RX ORDER — WARFARIN SODIUM 7.5 MG/1
7.5 TABLET ORAL
Status: COMPLETED | OUTPATIENT
Start: 2019-05-01 | End: 2019-05-01

## 2019-05-01 RX ORDER — ALBUTEROL SULFATE 1.25 MG/3ML
0.63 SOLUTION RESPIRATORY (INHALATION) EVERY 6 HOURS PRN
Status: DISCONTINUED | OUTPATIENT
Start: 2019-05-01 | End: 2019-05-03 | Stop reason: HOSPADM

## 2019-05-01 RX ADMIN — INSULIN LISPRO 5 UNITS: 100 INJECTION, SOLUTION INTRAVENOUS; SUBCUTANEOUS at 21:59

## 2019-05-01 RX ADMIN — INSULIN LISPRO 3 UNITS: 100 INJECTION, SOLUTION INTRAVENOUS; SUBCUTANEOUS at 11:48

## 2019-05-01 RX ADMIN — PREDNISONE 20 MG: 20 TABLET ORAL at 09:25

## 2019-05-01 RX ADMIN — ONDANSETRON 4 MG: 2 INJECTION INTRAMUSCULAR; INTRAVENOUS at 05:30

## 2019-05-01 RX ADMIN — HEPARIN SODIUM 11.57 UNITS/KG/HR: 10000 INJECTION, SOLUTION INTRAVENOUS at 14:21

## 2019-05-01 RX ADMIN — CEFTRIAXONE SODIUM 1 G: 1 INJECTION, POWDER, FOR SOLUTION INTRAMUSCULAR; INTRAVENOUS at 05:30

## 2019-05-01 RX ADMIN — GUAIFENESIN AND DEXTROMETHORPHAN 5 ML: 100; 10 SYRUP ORAL at 14:02

## 2019-05-01 RX ADMIN — GUAIFENESIN 400 MG: 400 TABLET ORAL at 14:20

## 2019-05-01 RX ADMIN — GUAIFENESIN 400 MG: 400 TABLET ORAL at 20:54

## 2019-05-01 RX ADMIN — WARFARIN SODIUM 7.5 MG: 7.5 TABLET ORAL at 15:06

## 2019-05-01 RX ADMIN — PATIROMER 8.4 G: 8.4 POWDER, FOR SUSPENSION ORAL at 09:25

## 2019-05-01 RX ADMIN — GABAPENTIN 300 MG: 300 CAPSULE ORAL at 14:02

## 2019-05-01 RX ADMIN — FERROUS SULFATE TAB 325 MG (65 MG ELEMENTAL FE) 325 MG: 325 (65 FE) TAB at 16:51

## 2019-05-01 RX ADMIN — HYDRALAZINE HYDROCHLORIDE 25 MG: 50 TABLET, FILM COATED ORAL at 09:27

## 2019-05-01 RX ADMIN — DOCUSATE SODIUM 100 MG: 100 CAPSULE, LIQUID FILLED ORAL at 09:26

## 2019-05-01 RX ADMIN — GABAPENTIN 300 MG: 300 CAPSULE ORAL at 20:54

## 2019-05-01 RX ADMIN — Medication 10 ML: at 14:02

## 2019-05-01 RX ADMIN — Medication: at 15:06

## 2019-05-01 RX ADMIN — AMLODIPINE BESYLATE 10 MG: 10 TABLET ORAL at 09:25

## 2019-05-01 RX ADMIN — CALCITRIOL 0.25 MCG: 0.25 CAPSULE ORAL at 09:25

## 2019-05-01 RX ADMIN — Medication 10 ML: at 09:26

## 2019-05-01 RX ADMIN — HYDRALAZINE HYDROCHLORIDE 25 MG: 50 TABLET, FILM COATED ORAL at 20:54

## 2019-05-01 RX ADMIN — HYDROCODONE BITARTRATE AND ACETAMINOPHEN 1 TABLET: 5; 325 TABLET ORAL at 23:04

## 2019-05-01 RX ADMIN — ONDANSETRON 4 MG: 2 INJECTION INTRAMUSCULAR; INTRAVENOUS at 21:04

## 2019-05-01 RX ADMIN — INSULIN LISPRO 9 UNITS: 100 INJECTION, SOLUTION INTRAVENOUS; SUBCUTANEOUS at 06:48

## 2019-05-01 RX ADMIN — GABAPENTIN 300 MG: 300 CAPSULE ORAL at 09:25

## 2019-05-01 RX ADMIN — GUAIFENESIN 400 MG: 400 TABLET ORAL at 09:25

## 2019-05-01 RX ADMIN — HYDROCODONE BITARTRATE AND ACETAMINOPHEN 1 TABLET: 5; 325 TABLET ORAL at 09:26

## 2019-05-01 RX ADMIN — FERROUS SULFATE TAB 325 MG (65 MG ELEMENTAL FE) 325 MG: 325 (65 FE) TAB at 09:25

## 2019-05-01 RX ADMIN — INSULIN LISPRO 9 UNITS: 100 INJECTION, SOLUTION INTRAVENOUS; SUBCUTANEOUS at 16:51

## 2019-05-01 RX ADMIN — HYDRALAZINE HYDROCHLORIDE 25 MG: 50 TABLET, FILM COATED ORAL at 14:02

## 2019-05-01 ASSESSMENT — PAIN SCALES - GENERAL
PAINLEVEL_OUTOF10: 0
PAINLEVEL_OUTOF10: 6
PAINLEVEL_OUTOF10: 5
PAINLEVEL_OUTOF10: 3
PAINLEVEL_OUTOF10: 0
PAINLEVEL_OUTOF10: 6

## 2019-05-01 ASSESSMENT — PAIN DESCRIPTION - PAIN TYPE
TYPE: CHRONIC PAIN
TYPE: CHRONIC PAIN

## 2019-05-01 ASSESSMENT — PAIN DESCRIPTION - ORIENTATION
ORIENTATION: RIGHT;LEFT
ORIENTATION: RIGHT;LEFT;LOWER

## 2019-05-01 ASSESSMENT — PAIN DESCRIPTION - ONSET
ONSET: ON-GOING
ONSET: ON-GOING

## 2019-05-01 ASSESSMENT — PAIN DESCRIPTION - PROGRESSION
CLINICAL_PROGRESSION: NOT CHANGED
CLINICAL_PROGRESSION: NOT CHANGED

## 2019-05-01 ASSESSMENT — PAIN DESCRIPTION - FREQUENCY
FREQUENCY: CONTINUOUS
FREQUENCY: INTERMITTENT

## 2019-05-01 ASSESSMENT — PAIN DESCRIPTION - LOCATION
LOCATION: BACK;KNEE
LOCATION: KNEE

## 2019-05-01 ASSESSMENT — PAIN - FUNCTIONAL ASSESSMENT: PAIN_FUNCTIONAL_ASSESSMENT: PREVENTS OR INTERFERES SOME ACTIVE ACTIVITIES AND ADLS

## 2019-05-01 ASSESSMENT — PAIN DESCRIPTION - DESCRIPTORS
DESCRIPTORS: DISCOMFORT;ACHING
DESCRIPTORS: DISCOMFORT;ACHING

## 2019-05-01 NOTE — PROGRESS NOTES
[facility]  Nephrology Attending Progress Note  Ema Tubbs.  Ines Howe MD        SUBJECTIVE:     4/29: coughing, developing edema  4/30: continues with coughing  5/1: coughing improving slowly      PROBLEM LIST:    Patient Active Problem List   Diagnosis    Neurologic gait dysfunction    Cellulitis    Renal failure, acute on chronic (HCC)    Diabetes mellitus ([facility] [address]    Hypertension    Sarcoidosis    Arthritis    Kidney disease    Acute cholecystitis    Anemia due to GI blood loss    GI bleed    Knee problem    Displaced fracture of medial condyle of left femur, initial encounter for closed fracture (HCC)    Anemia due to stage 3 chronic kidney disease (HCC)    Acute respiratory failure with hypoxia (HCC)    Pneumonia, bacterial    Acute pulmonary embolism (HCC)        PAST MEDICAL HISTORY:    Past Medical History:   Diagnosis Date    Arthritis     knees    Chronic knee pain     Depression     Diabetes mellitus (HCC)     type 2    Gall stones     Hypertension     Kidney disease     Kidney stones     Obesity     Sarcoidosis     SOBOE 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       DIET:    DIET RENAL;     PHYSICAL EXAM:     Patient Vitals for the past 24 hrs:   BP Temp Temp src Pulse Resp SpO2 Weight   05/01/19 0804 (!) 145/65 96.8 °F (36 °C) Temporal 87 18 93 % --   05/01/19 0557 -- -- -- -- -- -- 283 lb 1.6 oz (128.4 kg)   05/01/19 0009 (!) 144/65 98.6 °F (37 °C) Temporal 90 18 94 % --   04/30/19 2041 (!) 158/69 -- -- -- -- -- --   04/30/19 1700 -- -- -- -- 16 93 % --   04/30/19 1530 (!) 152/66 98.1 °F (36.7 °C) Temporal 79 18 93 % --   @      Intake/Output Summary (Last 24 hours) at 5/1/2019 1246  Last data filed at 5/1/2019 1001  Gross per 24 hour   Intake 370 ml   Output 2000 ml   Net -1630 ml         Wt Readings from Last 3 Encounters:   05/01/19 283 lb 1.6 oz (128.4 kg)   11/23/18 275 lb (124.7 kg)   06/23/16 (!) 325 lb 14.4 oz (147.8 kg)       Constitutional:  Pt is in no acute distress  Head: normocephalic, atraumatic  Neck: no JVD  Cardiovascular: regular rate and rhythm, no murmurs, gallops, or rubs  Respiratory:  No rales, rhochi, or wheezes  Gastrointestinal:  Soft, nontender, nondistended, bowel sounds x 4  Ext: tr edema  Skin: dry, no rash  Neuro: aaox3    MEDS (scheduled):    warfarin  7.5 mg Oral Once    predniSONE  20 mg Oral Daily    guaiFENesin  400 mg Oral TID    dextrose  25 g Intravenous Once    amLODIPine  10 mg Oral Daily    calcitRIOL  0.25 mcg Oral Daily    docusate sodium  100 mg Oral Daily    ferrous sulfate  325 mg Oral BID WC    gabapentin  300 mg Oral TID    insulin lispro  0-18 Units Subcutaneous TID WC    insulin lispro  0-9 Units Subcutaneous Nightly    cefTRIAXone (ROCEPHIN) IV  1 g Intravenous Q24H    patiromer sorbitex calcium  8.4 g Oral Daily    sodium chloride flush  10 mL Intravenous 2 times per day    warfarin (COUMADIN) daily dosing (placeholder)   Other RX Placeholder    hydrALAZINE  25 mg Oral TID       MEDS (infusions):   dextrose Stopped (04/30/19 1708)    heparin (porcine) 11.55 Units/kg/hr (04/30/19 2232)       MEDS (prn):  albuterol, HYDROcodone-acetaminophen, magnesium hydroxide, ondansetron, acetaminophen, glucose, dextrose, glucagon (rDNA), dextrose, guaiFENesin-dextromethorphan, sodium chloride flush, perflutren lipid microspheres, heparin (porcine), heparin (porcine)    DATA:    Recent Labs     04/28/19  1851 04/29/19  0729 04/30/19  0802   WBC 18.7* 14.5*  --    HGB 8.4* 7.9*  --    HCT 27.6* 25.7*  --    .5* 101.2*  --     170 225     Recent Labs     04/28/19  1851 04/29/19  0729    143   K 4.7 4.7   * 111*   CO2 23 19*   BUN 43* 42*   CREATININE 3.4* 3.3*   LABGLOM 13 14   GLUCOSE 96 58*   CALCIUM 8.5* 7.9*   ALT  --  7   AST  --  11   BILITOT  --  <0.2   ALKPHOS  --  70       Lab Results   Component Value Date    LABALBU 2.8 (L) 04/29/2019    LABALBU 3.0 (L) 11/27/2018    LABALBU 2.7 (L) 11/26/2018     Lab Results   Component Value Date    TSH 1.945 08/16/2013     No results found for: PHART, PO2ART, BZC3JSU    Iron Studies:   Lab Results   Component Value Date    IRON 31 (L) 11/27/2018    TIBC 183 (L) 11/27/2018    FERRITIN 139 11/27/2018         IMPRESSION/RECOMMENDATIONS:      1. Chronic kidney disease at stage 5 current GFR 14 cc/min/1.73 m2   diabetic  Nephropathy  renal function at baseline however associated Hyperkalemia and metab acidosis   She remains non oliguric  [place] to normalize K  condiser bicarb pills if co2 any further lower  monitor her volume status and consider diuretics as needed  she has chronic  edema  Dc ivf, dose lasix yesterday    2. Juvencio pneumonia - possible post viral infection based on history , feels better on AB     3. Morbid Obesity , chronic edema and skin changes juvencio lower extremity      4. H/O sarcoid      5. Anemia of ckd check iron, replace if low and consider ARUN if HB drops further And once infection clears      6. Suspected PE as high DDimers and recent long car drive - V/Q intermediate prob , per pulm                                            Ival Marker.  Nicho Ramirez MD

## 2019-05-01 NOTE — CARE COORDINATION
5/1/2019 social work:discharge planning   Discussed pt eval [facility] score and discharge plan with patient. Patient chose [address] [place] [address] for snf ( declined snf list) and referral made.

## 2019-05-01 NOTE — PROGRESS NOTES
Denver Battle, M.D.,[facility]  Derek Chu D.O., F.A.C.O.I., Sarah Fitzgerald M.D. Marv Burger M.D., Howrad Reyes M.D. Daily Pulmonary Progress Note    Patient:  Emeka Hassan 70 y.o. female MRN: [id number]     Date of Service: 5/1/2019      Synopsis     We are following patient for RLL CAP, possible ALISON PE, stage I pulmonary sarcoid    \"CC\" cough    Code status: FULL      Subjective      Patient was seen and examined. Sitting up in the chair in no acute distress. She continues with + moist cough, using flutter valve, having easier time with expectorating sputum. She had some episodes of expiratory wheezing yesterday afternoon. She received several breathing treatments and states they made her feel very jittery and she did not tolerate them very well. She asked him to be discontinued today. She has had no further nausea or emesis. Off oxygen at present. No fevers. She does complain of some mild pleuritic pain under her left breast area worsens with coughing and deep breathing. Continues on IV heparin infusion with nomogram. Transitioning to coumadin. INR 1.2 today. Pharmacy dosing. Review of Systems:  Constitutional:  +fatigue and weakness  Skin: Denies pigmentation, dark lesions, and rashes   HEENT: Denies hearing loss, tinnitus, ear drainage, epistaxis, sore throat, and hoarseness. Cardiovascular: Denies palpitations, chest pain, and chest pressure. Respiratory: Denies cough, dyspnea at rest, hemoptysis, apnea, and choking.   Gastrointestinal: Denies nausea, vomiting, poor appetite, diarrhea, heartburn or reflux  Genitourinary: Denies dysuria, frequency, urgency or hematuria  Musculoskeletal: Denies myalgias, muscle weakness, and bone pain  Neurological: Denies dizziness, vertigo, headache, and focal weakness  Psychological: Denies anxiety and depression  Endocrine: Denies heat intolerance and cold intolerance  Hematopoietic/Lymphatic: Denies bleeding problems and blood transfusions    24-hour events:  None    Objective   Vitals: BP (!) 145/65   Pulse 87   Temp 96.8 °F (36 °C) (Temporal)   Resp 18   Ht 5' 1\" (1.549 m)   Wt 283 lb 1.6 oz (128.4 kg)   SpO2 93%   BMI 53.49 kg/m²     I/O:    Intake/Output Summary (Last 24 hours) at 5/1/2019 1227  Last data filed at 5/1/2019 1001  Gross per 24 hour   Intake 370 ml   Output 2000 ml   Net -1630 ml       CURRENT MEDS :  Scheduled Meds:   warfarin  7.5 mg Oral Once    predniSONE  20 mg Oral Daily    guaiFENesin  400 mg Oral TID    dextrose  25 g Intravenous Once    amLODIPine  10 mg Oral Daily    calcitRIOL  0.25 mcg Oral Daily    docusate sodium  100 mg Oral Daily    ferrous sulfate  325 mg Oral BID WC    gabapentin  300 mg Oral TID    insulin lispro  0-18 Units Subcutaneous TID WC    insulin lispro  0-9 Units Subcutaneous Nightly    cefTRIAXone (ROCEPHIN) IV  1 g Intravenous Q24H    patiromer sorbitex calcium  8.4 g Oral Daily    sodium chloride flush  10 mL Intravenous 2 times per day    warfarin (COUMADIN) daily dosing (placeholder)   Other RX Placeholder    hydrALAZINE  25 mg Oral TID       Physical Exam:  General Appearance: appears comfortable in no acute distress. HEENT: Normocephalic atraumatic without obvious abnormality   Neck: Lips, mucosa, and tongue normal.  Supple, symmetrical, trachea midline, no adenopathy;thyroid:  no enlargement/tenderness/nodules or JVD. Lung: Breath sounds few rhonchi. No active wheeze at present. Respirations   unlabored. Symmetrical expansion. Heart: RRR, normal S1, S2. No MRG  Abdomen: Soft, NT, ND. BS present x 4 quadrants. No bruit or organomegaly. Extremities: Pedal pulses 2+ symmetric b/l. Extremities normal, no cyanosis, clubbing, or edema. Musculokeletal: No joint swelling, no muscle tenderness. ROM normal in all joints of extremities. Neurologic: Mental status: Alert and Oriented X3 .     Pertinent/ New Labs and Imaging Studies     Imaging Personally Reviewed:  4/27/19 CXR       FINDINGS:       Opacities are seen at right and left infrahilar locations. No obvious   pleural effusion. The heart is normal in size. No evidence of   pneumothorax.           Impression       Findings are suggestive of bilateral pneumonia notable on the right. Clinical correlation recommended. Short-term follow-up could be   helpful for further evaluation. VQ scan 4/28      Findings:    The patient received 29 millicuries of [id number].       Ventilation images reveal Normal ventilation throughout the lung   fields       The patient received 8 millicuries of technetium MAA.       Perfusion images reveal moderate segmental defects within the   posterior aspect of the left upper lobe and posterior aspect of the   right lower lobe. The heart is enlarged.           Impression       1. Intermediate probability for pulmonary embolism. 2. Cardiomegaly. Echo 4/29  Summary   Normal left ventricular systolic function.   Ejection fraction is visually estimated at 60%.  Mild concentric left ventricular hypertrophy.   Normal right ventricular size and function (TAPSE 2.6 cm).   Mild tricuspid regurgitation.   PASP is estimated at 55 mmHg.       BLE dopplers 4/29/19  Negative DVT     Labs:  Lab Results   Component Value Date    WBC 14.5 04/29/2019    HGB 7.9 04/29/2019    HCT 25.7 04/29/2019    .2 04/29/2019    MCH 31.1 04/29/2019    MCHC 30.7 04/29/2019    RDW 13.6 04/29/2019     04/30/2019    MPV 11.0 04/29/2019     Lab Results   Component Value Date     04/29/2019    K 4.7 04/29/2019     04/29/2019    CO2 19 04/29/2019    BUN 42 04/29/2019    CREATININE 3.3 04/29/2019    LABALBU 2.8 04/29/2019    LABALBU 4.1 10/12/2011    CALCIUM 7.9 04/29/2019    GFRAA 17 04/29/2019    LABGLOM 14 04/29/2019     Lab Results   Component Value Date    PROTIME 14.3 05/01/2019    PROTIME 15.3 06/25/2011    INR 1.2 05/01/2019     No results for input(s): PROBNP in the last 72 hours. No results for input(s): PROCAL in the last 72 hours. This SmartLink has not been configured with any valid records. Micro:  Recent Labs     04/29/19  0800   CULTRESP Oral Pharyngeal Rebecca reduced     No results for input(s): LABGRAM in the last 72 hours. No results for input(s): LEGUR in the last 72 hours. No results for input(s): STREPNEUMAGU in the last 72 hours. No results for input(s): LP1UAG in the last 72 hours. 4/29/19 Respiratory culture  Group 5: >25 PMN's/LPF and <10 Epithelial cells/LPF   Abundant Polymorphonuclear leukocytes   Epithelial cells not seen   Rare Gram positive cocci      Assessment:    1. Right lower lobe community-acquired pneumonia. 2.  Possible left upper lobe pulmonary embolism as demonstrated by a  perfusion defect on V/Q. The perfusion defect that corresponds to the  right lower lobe is associated most likely with a right lower lobe  pneumonia. 3.  Chronic kidney disease. 4.  Morbid obesity. 5.  History of stage I sarcoid. Plan:   1. Oxygen therapy wean off to keep >92%  2. Check ambulatory O2 testing prior to dc home  3. Antibiotics for CAP-rocephin  4. Echo with no RV strain   5. US BLE negative for dvt  6. IV heparin gtt with Transition to Coumadin. INR 1.2 dosing per pharmacy  7. Follow CXR in am  8. Continue flutter valve, mucinex TID to assist with mucus clearance  9. Prednisone 40 mg daily, albuterol every 4 hours as needed for wheezing and shortness of breath. 10. Blood glucose management per primary team   11. Renal following for mgmt of CKD  12. Outpatient PFTs  13. PT/OT, increase activity as tolerated need special shoes from home to ambulate with assistance. This plan of care was reviewed in collaboration with Dr. Nancy Anguiano  Electronically signed by PABLO Taylor CNP on 5/1/2019 at 12:27 PM    Addendum; Will switch albuterol to Xopenex due to patient getting tachycardic and tremors and shaky.   Continue Coumadin dosing per pharmacy. Will wean prednisone to 20 mg in a.m. PTOT    I personally saw, examined, and cared for the patient. Labs, medications, radiographs reviewed. I agree with history exam and plans detailed in NP note.   Aicha Morillo MD

## 2019-05-01 NOTE — PROGRESS NOTES
Patients blood glucose 316. Patient refusing insulin at this time due to sugar dropping. Patient agreeable to rechecking sugar at 0000.  Will continue to monitor

## 2019-05-01 NOTE — PROGRESS NOTES
Pharmacy Consultation Note  (Warfarin Dosing and Monitoring)    Initial consult date: 4/28/19  Consulting physician: Dr. Ger Lim    Allergies:  Patient has no known allergies. Warfarin Indication Target   INR Range Home Dose  (if applicable)   Diet/Feeding Tube   Pulmonary Embolism 2 - 3 N/A 4/28: renal     Warfarin Drug Interactions Start Stop Comment   doxycycline 4/29 4/30 may increase INR          TSH: 1.945 (8-)   Hepatic Function Panel:                            Lab Results   Component Value Date    ALKPHOS 70 04/29/2019    ALT 7 04/29/2019    AST 11 04/29/2019    PROT 5.6 04/29/2019    BILITOT <0.2 04/29/2019    BILIDIR 0.8 06/25/2011    LABALBU 2.8 04/29/2019    LABALBU 4.1 10/12/2011     Date Warfarin Dose INR Heparin or LMWH HBG/HCT PLT Comment   4/28 5 mg 1.1 Heparin bridge to start 8.4/27.6 196    4/29 5 mg 1 UFH infusion 7.9/25.7 170    4/30 5 mg 1 UFH infusion X 225    5/1 7.5 mg 1.2 UFH infusion X X               Assessment:  · Adwoa Méndez is a 68 y.o./F; 154.9 cm, 130.6 kg who initially presented for evaluation of RLL CAP and possible ALISON PE.    · Pharmacy was consulted to dose/monitor warfarin and initiate heparin for bridging. The patient is a new start on warfarin and has a PMH significant for CKD and morbid obesity. · 4/29: INR = 1; ATB change today from azithromycin (d/t long QTc) to doxycycline. · Significant drug-drug interaction between warfarin and doxycyline. · 4/30: INR = 1; day #3 overlap warfarin-UFH infusion. · 5/1: INR = 1.2; day #4 overlap warfarin-UFH. APTT therapeutic on current rate. Doxycycline stopped on 4/30 d/t pt c/o nausea. Plan:  · Increase warfarin to 7.5 mg x1 today. · Daily PT/INR until the INR is stable within the therapeutic range. · Pharmacist will follow and monitor/adjust dosing as necessary.     Ester Lee, PharmD  5/1/2019  12:20 PM  Pager: [phone]

## 2019-05-01 NOTE — PLAN OF CARE
Problem: Falls - Risk of:  Goal: Absence of physical injury  Description  Absence of physical injury  Outcome: Met This Shift     Problem: Breathing Pattern - Ineffective:  Goal: Ability to achieve and maintain a regular respiratory rate will improve  Description  Ability to achieve and maintain a regular respiratory rate will improve  Outcome: Met This Shift     Problem:  Activity:  Goal: Fatigue will decrease  Description  Fatigue will decrease  Outcome: Met This Shift

## 2019-05-02 ENCOUNTER — APPOINTMENT (OUTPATIENT)
Dept: GENERAL RADIOLOGY | Age: 69
DRG: 193 | End: 2019-05-02
Payer: MEDICARE

## 2019-05-02 LAB
ALBUMIN SERPL-MCNC: 3 G/DL (ref 3.5–5.2)
ALP BLD-CCNC: 80 U/L (ref 35–104)
ALT SERPL-CCNC: 12 U/L (ref 0–32)
ANION GAP SERPL CALCULATED.3IONS-SCNC: 16 MMOL/L (ref 7–16)
APTT: 57.3 SEC (ref 24.5–35.1)
APTT: 83.4 SEC (ref 24.5–35.1)
APTT: 98.5 SEC (ref 24.5–35.1)
AST SERPL-CCNC: 29 U/L (ref 0–31)
BASOPHILS ABSOLUTE: 0.06 E9/L (ref 0–0.2)
BASOPHILS RELATIVE PERCENT: 0.5 % (ref 0–2)
BILIRUB SERPL-MCNC: <0.2 MG/DL (ref 0–1.2)
BUN BLDV-MCNC: 40 MG/DL (ref 8–23)
CALCIUM SERPL-MCNC: 8.9 MG/DL (ref 8.6–10.2)
CHLORIDE BLD-SCNC: 106 MMOL/L (ref 98–107)
CO2: 19 MMOL/L (ref 22–29)
CREAT SERPL-MCNC: 3.1 MG/DL (ref 0.5–1)
EOSINOPHILS ABSOLUTE: 0.05 E9/L (ref 0.05–0.5)
EOSINOPHILS RELATIVE PERCENT: 0.4 % (ref 0–6)
FERRITIN: 362 NG/ML
GFR AFRICAN AMERICAN: 18
GFR NON-AFRICAN AMERICAN: 15 ML/MIN/1.73
GLUCOSE BLD-MCNC: 215 MG/DL (ref 74–99)
HCT VFR BLD CALC: 27.2 % (ref 34–48)
HEMOGLOBIN: 8.6 G/DL (ref 11.5–15.5)
IMMATURE GRANULOCYTES #: 0.47 E9/L
IMMATURE GRANULOCYTES %: 3.8 % (ref 0–5)
INR BLD: 1.6
IRON SATURATION: 49 % (ref 15–50)
IRON: 120 MCG/DL (ref 37–145)
LYMPHOCYTES ABSOLUTE: 1.24 E9/L (ref 1.5–4)
LYMPHOCYTES RELATIVE PERCENT: 10.1 % (ref 20–42)
MCH RBC QN AUTO: 31.4 PG (ref 26–35)
MCHC RBC AUTO-ENTMCNC: 31.6 % (ref 32–34.5)
MCV RBC AUTO: 99.3 FL (ref 80–99.9)
METER GLUCOSE: 203 MG/DL (ref 74–99)
METER GLUCOSE: 252 MG/DL (ref 74–99)
METER GLUCOSE: 257 MG/DL (ref 74–99)
METER GLUCOSE: 259 MG/DL (ref 74–99)
MONOCYTES ABSOLUTE: 0.81 E9/L (ref 0.1–0.95)
MONOCYTES RELATIVE PERCENT: 6.6 % (ref 2–12)
NEUTROPHILS ABSOLUTE: 9.62 E9/L (ref 1.8–7.3)
NEUTROPHILS RELATIVE PERCENT: 78.6 % (ref 43–80)
PDW BLD-RTO: 13.2 FL (ref 11.5–15)
PLATELET # BLD: 249 E9/L (ref 130–450)
PMV BLD AUTO: 10.7 FL (ref 7–12)
POTASSIUM SERPL-SCNC: 5.4 MMOL/L (ref 3.5–5)
PROTHROMBIN TIME: 18.3 SEC (ref 9.3–12.4)
RBC # BLD: 2.74 E12/L (ref 3.5–5.5)
SODIUM BLD-SCNC: 141 MMOL/L (ref 132–146)
TOTAL IRON BINDING CAPACITY: 246 MCG/DL (ref 250–450)
TOTAL PROTEIN: 6.3 G/DL (ref 6.4–8.3)
WBC # BLD: 12.3 E9/L (ref 4.5–11.5)

## 2019-05-02 PROCEDURE — 6370000000 HC RX 637 (ALT 250 FOR IP): Performed by: NURSE PRACTITIONER

## 2019-05-02 PROCEDURE — 6370000000 HC RX 637 (ALT 250 FOR IP): Performed by: FAMILY MEDICINE

## 2019-05-02 PROCEDURE — 83550 IRON BINDING TEST: CPT

## 2019-05-02 PROCEDURE — 2580000003 HC RX 258: Performed by: INTERNAL MEDICINE

## 2019-05-02 PROCEDURE — 85025 COMPLETE CBC W/AUTO DIFF WBC: CPT

## 2019-05-02 PROCEDURE — 6360000002 HC RX W HCPCS: Performed by: INTERNAL MEDICINE

## 2019-05-02 PROCEDURE — 85730 THROMBOPLASTIN TIME PARTIAL: CPT

## 2019-05-02 PROCEDURE — 82962 GLUCOSE BLOOD TEST: CPT

## 2019-05-02 PROCEDURE — 6370000000 HC RX 637 (ALT 250 FOR IP)

## 2019-05-02 PROCEDURE — 6370000000 HC RX 637 (ALT 250 FOR IP): Performed by: INTERNAL MEDICINE

## 2019-05-02 PROCEDURE — 83540 ASSAY OF IRON: CPT

## 2019-05-02 PROCEDURE — 2580000003 HC RX 258: Performed by: FAMILY MEDICINE

## 2019-05-02 PROCEDURE — 6360000002 HC RX W HCPCS: Performed by: FAMILY MEDICINE

## 2019-05-02 PROCEDURE — 36415 COLL VENOUS BLD VENIPUNCTURE: CPT

## 2019-05-02 PROCEDURE — 82728 ASSAY OF FERRITIN: CPT

## 2019-05-02 PROCEDURE — 80053 COMPREHEN METABOLIC PANEL: CPT

## 2019-05-02 PROCEDURE — 71045 X-RAY EXAM CHEST 1 VIEW: CPT

## 2019-05-02 PROCEDURE — 85610 PROTHROMBIN TIME: CPT

## 2019-05-02 PROCEDURE — 2060000000 HC ICU INTERMEDIATE R&B

## 2019-05-02 RX ORDER — ALPRAZOLAM 0.25 MG/1
0.25 TABLET ORAL EVERY 8 HOURS PRN
Status: DISCONTINUED | OUTPATIENT
Start: 2019-05-02 | End: 2019-05-03 | Stop reason: HOSPADM

## 2019-05-02 RX ORDER — HYDRALAZINE HYDROCHLORIDE 20 MG/ML
10 INJECTION INTRAMUSCULAR; INTRAVENOUS EVERY 6 HOURS PRN
Status: DISCONTINUED | OUTPATIENT
Start: 2019-05-02 | End: 2019-05-02

## 2019-05-02 RX ORDER — HYDRALAZINE HYDROCHLORIDE 20 MG/ML
10 INJECTION INTRAMUSCULAR; INTRAVENOUS EVERY 4 HOURS PRN
Status: DISCONTINUED | OUTPATIENT
Start: 2019-05-02 | End: 2019-05-03 | Stop reason: HOSPADM

## 2019-05-02 RX ORDER — HYDRALAZINE HYDROCHLORIDE 50 MG/1
50 TABLET, FILM COATED ORAL 3 TIMES DAILY
Status: DISCONTINUED | OUTPATIENT
Start: 2019-05-02 | End: 2019-05-03

## 2019-05-02 RX ORDER — CHOLECALCIFEROL (VITAMIN D3) 125 MCG
5 CAPSULE ORAL NIGHTLY PRN
Status: DISCONTINUED | OUTPATIENT
Start: 2019-05-02 | End: 2019-05-03 | Stop reason: HOSPADM

## 2019-05-02 RX ORDER — WARFARIN SODIUM 7.5 MG/1
7.5 TABLET ORAL
Status: COMPLETED | OUTPATIENT
Start: 2019-05-02 | End: 2019-05-02

## 2019-05-02 RX ADMIN — PREDNISONE 20 MG: 20 TABLET ORAL at 08:24

## 2019-05-02 RX ADMIN — PATIROMER 8.4 G: 8.4 POWDER, FOR SUSPENSION ORAL at 11:03

## 2019-05-02 RX ADMIN — HYDRALAZINE HYDROCHLORIDE 25 MG: 50 TABLET, FILM COATED ORAL at 08:24

## 2019-05-02 RX ADMIN — HYDRALAZINE HYDROCHLORIDE 10 MG: 20 INJECTION INTRAMUSCULAR; INTRAVENOUS at 19:00

## 2019-05-02 RX ADMIN — INSULIN LISPRO 5 UNITS: 100 INJECTION, SOLUTION INTRAVENOUS; SUBCUTANEOUS at 22:10

## 2019-05-02 RX ADMIN — INSULIN LISPRO 9 UNITS: 100 INJECTION, SOLUTION INTRAVENOUS; SUBCUTANEOUS at 17:00

## 2019-05-02 RX ADMIN — GUAIFENESIN 400 MG: 400 TABLET ORAL at 14:15

## 2019-05-02 RX ADMIN — GABAPENTIN 300 MG: 300 CAPSULE ORAL at 08:25

## 2019-05-02 RX ADMIN — HEPARIN SODIUM 9.5 UNITS/KG/HR: 10000 INJECTION, SOLUTION INTRAVENOUS at 12:01

## 2019-05-02 RX ADMIN — GUAIFENESIN 400 MG: 400 TABLET ORAL at 22:04

## 2019-05-02 RX ADMIN — ALPRAZOLAM 0.25 MG: 0.25 TABLET ORAL at 21:32

## 2019-05-02 RX ADMIN — HEPARIN SODIUM 9.5 UNITS/KG/HR: 10000 INJECTION, SOLUTION INTRAVENOUS at 07:26

## 2019-05-02 RX ADMIN — Medication 10 ML: at 22:05

## 2019-05-02 RX ADMIN — FERROUS SULFATE TAB 325 MG (65 MG ELEMENTAL FE) 325 MG: 325 (65 FE) TAB at 17:00

## 2019-05-02 RX ADMIN — HYDRALAZINE HYDROCHLORIDE 10 MG: 20 INJECTION INTRAMUSCULAR; INTRAVENOUS at 15:39

## 2019-05-02 RX ADMIN — HYDRALAZINE HYDROCHLORIDE 50 MG: 50 TABLET, FILM COATED ORAL at 19:00

## 2019-05-02 RX ADMIN — Medication: at 21:58

## 2019-05-02 RX ADMIN — INSULIN LISPRO 9 UNITS: 100 INJECTION, SOLUTION INTRAVENOUS; SUBCUTANEOUS at 12:07

## 2019-05-02 RX ADMIN — GABAPENTIN 300 MG: 300 CAPSULE ORAL at 14:15

## 2019-05-02 RX ADMIN — DOCUSATE SODIUM 100 MG: 100 CAPSULE, LIQUID FILLED ORAL at 08:25

## 2019-05-02 RX ADMIN — CALCITRIOL 0.25 MCG: 0.25 CAPSULE ORAL at 08:24

## 2019-05-02 RX ADMIN — HYDROCODONE BITARTRATE AND ACETAMINOPHEN 1 TABLET: 5; 325 TABLET ORAL at 14:15

## 2019-05-02 RX ADMIN — AMLODIPINE BESYLATE 10 MG: 10 TABLET ORAL at 08:24

## 2019-05-02 RX ADMIN — HYDRALAZINE HYDROCHLORIDE 50 MG: 50 TABLET, FILM COATED ORAL at 12:09

## 2019-05-02 RX ADMIN — FERROUS SULFATE TAB 325 MG (65 MG ELEMENTAL FE) 325 MG: 325 (65 FE) TAB at 08:24

## 2019-05-02 RX ADMIN — INSULIN LISPRO 6 UNITS: 100 INJECTION, SOLUTION INTRAVENOUS; SUBCUTANEOUS at 06:33

## 2019-05-02 RX ADMIN — GUAIFENESIN 400 MG: 400 TABLET ORAL at 08:24

## 2019-05-02 RX ADMIN — CEFTRIAXONE SODIUM 1 G: 1 INJECTION, POWDER, FOR SOLUTION INTRAMUSCULAR; INTRAVENOUS at 04:27

## 2019-05-02 RX ADMIN — Medication: at 08:27

## 2019-05-02 RX ADMIN — WARFARIN SODIUM 7.5 MG: 7.5 TABLET ORAL at 10:50

## 2019-05-02 RX ADMIN — Medication 10 ML: at 08:25

## 2019-05-02 RX ADMIN — GABAPENTIN 300 MG: 300 CAPSULE ORAL at 22:04

## 2019-05-02 ASSESSMENT — PAIN SCALES - GENERAL
PAINLEVEL_OUTOF10: 6
PAINLEVEL_OUTOF10: 0
PAINLEVEL_OUTOF10: 1
PAINLEVEL_OUTOF10: 0

## 2019-05-02 ASSESSMENT — PAIN DESCRIPTION - FREQUENCY
FREQUENCY: INTERMITTENT
FREQUENCY: INTERMITTENT

## 2019-05-02 ASSESSMENT — PAIN DESCRIPTION - PAIN TYPE
TYPE: CHRONIC PAIN
TYPE: CHRONIC PAIN

## 2019-05-02 ASSESSMENT — PAIN DESCRIPTION - ORIENTATION
ORIENTATION: LEFT
ORIENTATION: LEFT

## 2019-05-02 ASSESSMENT — PAIN DESCRIPTION - DESCRIPTORS
DESCRIPTORS: DISCOMFORT
DESCRIPTORS: DISCOMFORT;NAGGING

## 2019-05-02 ASSESSMENT — PAIN DESCRIPTION - LOCATION
LOCATION: BACK;RIB CAGE
LOCATION: BACK;RIB CAGE

## 2019-05-02 ASSESSMENT — PAIN DESCRIPTION - PROGRESSION
CLINICAL_PROGRESSION: GRADUALLY IMPROVING
CLINICAL_PROGRESSION: NOT CHANGED

## 2019-05-02 ASSESSMENT — PAIN DESCRIPTION - ONSET
ONSET: ON-GOING
ONSET: ON-GOING

## 2019-05-02 NOTE — PROGRESS NOTES
Subjective: The patient is awake and alert. Tolerating diet. Some nausea no vomiting. Back pain    Objective:    BP (!) 168/74   Pulse 87   Temp 99.6 °F (37.6 °C) (Temporal)   Resp 20   Ht 5' 1\" (1.549 m)   Wt 283 lb 1.6 oz (128.4 kg)   SpO2 92%   BMI 53.49 kg/m²     Heart:  RRR, no murmurs, gallops, or rubs.   Lungs:  CTA bilaterally, no wheeze, rales or rhonchi  Abd: bowel sounds present, nontender, nondistended, no masses  Extrem:  No clubbing, cyanosis, or edema    CBC with Differential:    Lab Results   Component Value Date    WBC 14.5 04/29/2019    RBC 2.54 04/29/2019    HGB 7.9 04/29/2019    HCT 25.7 04/29/2019     04/30/2019    .2 04/29/2019    MCH 31.1 04/29/2019    MCHC 30.7 04/29/2019    RDW 13.6 04/29/2019    SEGSPCT 71 08/16/2013    BANDSPCT 1 03/29/2016    METASPCT 1 07/03/2011    LYMPHOPCT 9.4 04/29/2019    MONOPCT 8.2 04/29/2019    MYELOPCT 1 06/26/2011    BASOPCT 0.3 04/29/2019    MONOSABS 1.19 04/29/2019    LYMPHSABS 1.36 04/29/2019    EOSABS 0.29 04/29/2019    BASOSABS 0.04 04/29/2019     CMP:    Lab Results   Component Value Date     04/29/2019    K 4.7 04/29/2019     04/29/2019    CO2 19 04/29/2019    BUN 42 04/29/2019    CREATININE 3.3 04/29/2019    GFRAA 17 04/29/2019    LABGLOM 14 04/29/2019    GLUCOSE 58 04/29/2019    GLUCOSE 149 10/12/2011    PROT 5.6 04/29/2019    LABALBU 2.8 04/29/2019    LABALBU 4.1 10/12/2011    CALCIUM 7.9 04/29/2019    BILITOT <0.2 04/29/2019    ALKPHOS 70 04/29/2019    AST 11 04/29/2019    ALT 7 04/29/2019    Inr 1.2    Assessment:    Patient Active Problem List   Diagnosis    Neurologic gait dysfunction    Cellulitis    Renal failure, acute on chronic ([address]    Diabetes mellitus ([facility] [address]    Hypertension    Sarcoidosis    Arthritis    Kidney disease    Acute cholecystitis    Anemia due to GI blood loss    GI bleed    Knee problem    Displaced fracture of medial condyle of left femur, initial encounter for closed fracture ([facility] [address]    Anemia due to stage 3 chronic kidney disease ([address]    Acute respiratory failure with hypoxia (HCC)    Pneumonia, bacterial    Acute pulmonary embolism ([facility] [address]       Plan:  Continue current care  CBC CMP in AM  D/C'd doxycycline due to nausea        Rachel Sorensen  10:36 PM  5/1/2019

## 2019-05-02 NOTE — PLAN OF CARE
Problem: Breathing Pattern - Ineffective:  Goal: Ability to achieve and maintain a regular respiratory rate will improve  Description  Ability to achieve and maintain a regular respiratory rate will improve  5/2/2019 0651 by True Moise RN  Outcome: Met This Shift  5/1/2019 2109 by Hammad Swain RN  Outcome: Met This Shift     Problem: Falls - Risk of:  Goal: Absence of physical injury  Description  Absence of physical injury  Outcome: Met This Shift     Problem: Respiratory:  Goal: Ability to maintain a clear airway will improve  Description  Ability to maintain a clear airway will improve  5/2/2019 0651 by True Moise RN  Outcome: Met This Shift  5/1/2019 2109 by Hammad Swain RN  Outcome: Met This Shift

## 2019-05-02 NOTE — PROGRESS NOTES
Subjective: The patient is awake and alert. No problems overnight. Breathing is improving. Less pleuritic pain. Denies abdominal pain. Tolerating diet. No nausea or vomiting. Objective:    BP (!) 180/92   Pulse 74   Temp 97.3 °F (36.3 °C) (Temporal)   Resp 23   Ht 5' 1\" (1.549 m)   Wt 280 lb 1.6 oz (127.1 kg)   SpO2 96%   BMI 52.92 kg/m²     Heart:  RRR, no murmurs, gallops, or rubs.   Lungs:  CTA bilaterally, no wheeze, rales or rhonchi  Abd: bowel sounds present, nontender, nondistended, no masses  Extrem:  No clubbing, cyanosis, or edema    CBC with Differential:    Lab Results   Component Value Date    WBC 12.3 05/02/2019    RBC 2.74 05/02/2019    HGB 8.6 05/02/2019    HCT 27.2 05/02/2019     05/02/2019    MCV 99.3 05/02/2019    MCH 31.4 05/02/2019    MCHC 31.6 05/02/2019    RDW 13.2 05/02/2019    SEGSPCT 71 08/16/2013    BANDSPCT 1 03/29/2016    METASPCT 1 07/03/2011    LYMPHOPCT 10.1 05/02/2019    MONOPCT 6.6 05/02/2019    MYELOPCT 1 06/26/2011    BASOPCT 0.5 05/02/2019    MONOSABS 0.81 05/02/2019    LYMPHSABS 1.24 05/02/2019    EOSABS 0.05 05/02/2019    BASOSABS 0.06 05/02/2019     CMP:    Lab Results   Component Value Date     05/02/2019    K 5.4 05/02/2019    K 4.7 04/29/2019     05/02/2019    CO2 19 05/02/2019    BUN 40 05/02/2019    CREATININE 3.1 05/02/2019    GFRAA 18 05/02/2019    LABGLOM 15 05/02/2019    GLUCOSE 215 05/02/2019    GLUCOSE 149 10/12/2011    PROT 6.3 05/02/2019    LABALBU 3.0 05/02/2019    LABALBU 4.1 10/12/2011    CALCIUM 8.9 05/02/2019    BILITOT <0.2 05/02/2019    ALKPHOS 80 05/02/2019    AST 29 05/02/2019    ALT 12 05/02/2019     PT/INR:    Lab Results   Component Value Date    PROTIME 18.3 05/02/2019    PROTIME 15.3 06/25/2011    INR 1.6 05/02/2019        Assessment:    Patient Active Problem List   Diagnosis    Neurologic gait dysfunction    Cellulitis    Renal failure, acute on chronic ([facility] [address]    Diabetes mellitus ([facility] [address]    Hypertension    Sarcoidosis    Arthritis    Kidney disease    Acute cholecystitis    Anemia due to GI blood loss    GI bleed    Knee problem    Displaced fracture of medial condyle of left femur, initial encounter for closed fracture (HCC)    Anemia due to stage 3 chronic kidney disease ([facility] [address]    Acute respiratory failure with hypoxia (HCC)    Pneumonia, bacterial    Acute pulmonary embolism ([facility] [address]       Plan:  Discharge planning  Discharge once INR is 2 tentatively in the next 24-48 hours        Mony Moscoso  1:01 PM  5/2/2019

## 2019-05-02 NOTE — PROGRESS NOTES
Roseline Connolly M.D.,[facility]  Kya Reveles D.O., F.A.C.O.I., Magnus Nyhan, M.D. Regis An M.D., Torrey Ivy M.D. Daily Pulmonary Progress Note    Patient:  Roseline Burrell 70 y.o. female MRN: [id number]     Date of Service: 5/2/2019      Synopsis     We are following patient for RLL CAP, possible ALISON PE, stage I pulmonary sarcoid    \"CC\" cough    Code status: FULL      Subjective      Patient was seen and examined. Sitting up in the chair in no acute distress. She continues with + moist cough, using flutter valve, having easier time with expectorating sputum. Improvement in expiratory wheeze. Not requiring bronchodilator treatment overnight. She has had no further nausea or emesis. Off oxygen at present. No fevers. She does complain of some mild pleuritic pain under her left breast area worsens with coughing and deep breathing. Continues on IV heparin infusion with nomogram. Transitioning to coumadin. INR 1.6 today. Pharmacy dosing. Review of Systems:  Constitutional:  +fatigue and weakness improving slowly  Skin: Denies pigmentation, dark lesions, and rashes   HEENT: Denies hearing loss, tinnitus, ear drainage, epistaxis, sore throat, and hoarseness. Cardiovascular: Denies palpitations, chest pain, and chest pressure. Respiratory: Denies cough, dyspnea at rest, hemoptysis, apnea, and choking.   Gastrointestinal: Denies nausea, vomiting, poor appetite, diarrhea, heartburn or reflux  Genitourinary: Denies dysuria, frequency, urgency or hematuria  Musculoskeletal: Denies myalgias, muscle weakness, and bone pain  Neurological: Denies dizziness, vertigo, headache, and focal weakness  Psychological: Denies anxiety and depression  Endocrine: Denies heat intolerance and cold intolerance  Hematopoietic/Lymphatic: Denies bleeding problems and blood transfusions    24-hour events:  None    Objective   Vitals: BP (!) 178/82   Pulse 82   Temp 97.7 °F (36.5 °C) (Temporal)   Resp 22   Ht 5' 1\" (1.549 m)   Wt 280 lb 1.6 oz (127.1 kg)   SpO2 92%   BMI 52.92 kg/m²     I/O:    Intake/Output Summary (Last 24 hours) at 5/2/2019 1027  Last data filed at 5/2/2019 0657  Gross per 24 hour   Intake 405.2 ml   Output 1400 ml   Net -994.8 ml       CURRENT MEDS :  Scheduled Meds:   warfarin  7.5 mg Oral Once    miconazole nitrate   Topical BID    predniSONE  20 mg Oral Daily    guaiFENesin  400 mg Oral TID    dextrose  25 g Intravenous Once    amLODIPine  10 mg Oral Daily    calcitRIOL  0.25 mcg Oral Daily    docusate sodium  100 mg Oral Daily    ferrous sulfate  325 mg Oral BID WC    gabapentin  300 mg Oral TID    insulin lispro  0-18 Units Subcutaneous TID WC    insulin lispro  0-9 Units Subcutaneous Nightly    cefTRIAXone (ROCEPHIN) IV  1 g Intravenous Q24H    patiromer sorbitex calcium  8.4 g Oral Daily    sodium chloride flush  10 mL Intravenous 2 times per day    warfarin (COUMADIN) daily dosing (placeholder)   Other RX Placeholder    hydrALAZINE  25 mg Oral TID       Physical Exam:  General Appearance: appears comfortable in no acute distress. HEENT: Normocephalic atraumatic without obvious abnormality   Neck: Lips, mucosa, and tongue normal.  Supple, symmetrical, trachea midline, no adenopathy;thyroid:  no enlargement/tenderness/nodules or JVD. Lung: Breath sounds few rhonchi. No active wheeze at present. Respirations   unlabored. Symmetrical expansion. Heart: RRR, normal S1, S2. No MRG  Abdomen: Soft, NT, ND. BS present x 4 quadrants. No bruit or organomegaly. Extremities: Pedal pulses 2+ symmetric b/l. Extremities normal, no cyanosis, clubbing, or edema. Musculokeletal: No joint swelling, no muscle tenderness. ROM normal in all joints of extremities. Neurologic: Mental status: Alert and Oriented X3 .     Pertinent/ New Labs and Imaging Studies     Imaging Personally Reviewed:  5/2/19 CXR    Age:  68 years       Gender: Female       Order Date: 5/2/2019 7:00 AM     LABALBU 4.1 10/12/2011    CALCIUM 8.9 05/02/2019    GFRAA 18 05/02/2019    LABGLOM 15 05/02/2019     Lab Results   Component Value Date    PROTIME 18.3 05/02/2019    PROTIME 15.3 06/25/2011    INR 1.6 05/02/2019     No results for input(s): PROBNP in the last 72 hours. No results for input(s): PROCAL in the last 72 hours. This SmartLink has not been configured with any valid records. Micro:  No results for input(s): CULTRESP in the last 72 hours. No results for input(s): LABGRAM in the last 72 hours. No results for input(s): LEGUR in the last 72 hours. No results for input(s): STREPNEUMAGU in the last 72 hours. No results for input(s): LP1UAG in the last 72 hours. 4/29/19 Respiratory culture  Group 5: >25 PMN's/LPF and <10 Epithelial cells/LPF   Abundant Polymorphonuclear leukocytes   Epithelial cells not seen   Rare Gram positive cocci      Assessment:    1. Right lower lobe community-acquired pneumonia. 2.  Possible left upper lobe pulmonary embolism as demonstrated by a  perfusion defect on V/Q. The perfusion defect that corresponds to the  right lower lobe is associated most likely with a right lower lobe  pneumonia. 3.  Chronic kidney disease. 4.  Morbid obesity. 5.  History of stage I sarcoid. Plan:   1. Oxygen therapy wean off to keep >92%  2. Check ambulatory O2 testing prior to dc home  3. Antibiotics for CAP-rocephin 4/29/19  4. Echo with no RV strain   5. US BLE negative for dvt  6. IV heparin gtt with Transition to Coumadin. INR 1.6 dosing per pharmacy  7. Follow CXR today  8. Continue flutter valve, mucinex TID to assist with mucus clearance  9. Prednisone decreased to 20 mg daily, Xopenex every 6 hours as needed for wheezing and shortness of breath. 10. Blood glucose management per primary team   11. Renal following for mgmt of CKD  12. Outpatient PFTs  13. PT/OT, increase activity as tolerated need special shoes from home to ambulate with assistance.     This plan of care was reviewed in collaboration with Dr. Demian Maradiaga  Electronically signed by PABLO Hernandez CNP on 5/2/2019 at 10:27 AM    I personally saw, examined, and cared for the patient. Labs, medications, radiographs reviewed. I agree with history exam and plans detailed in NP note.   Martin Bass MD

## 2019-05-02 NOTE — PROGRESS NOTES
[facility]  Nephrology Attending Progress Note  Jose Enrique Cutler MD        SUBJECTIVE:     4/29: coughing, developing edema  4/30: continues with coughing  5/1: coughing improving slowly  5/2: no new complaints, eating well      PROBLEM LIST:    Patient Active Problem List   Diagnosis    Neurologic gait dysfunction    Cellulitis    Renal failure, acute on chronic ([facility] [address]    Diabetes mellitus ([facility] [address]    Hypertension    Sarcoidosis    Arthritis    Kidney disease    Acute cholecystitis    Anemia due to GI blood loss    GI bleed    Knee problem    Displaced fracture of medial condyle of left femur, initial encounter for closed fracture (HCC)    Anemia due to stage 3 chronic kidney disease ([facility] [address]    [facility] with hypoxia ([facility])    Pneumonia, bacterial    Acute pulmonary embolism ([facility] [address]        PAST MEDICAL HISTORY:    Past Medical History:   Diagnosis Date    Arthritis     knees    Chronic knee pain     Depression     Diabetes mellitus (HCC)     type 2    Gall stones     Hypertension     Kidney disease     Kidney stones     Obesity     Sarcoidosis     SOBOE 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       DIET:    DIET RENAL;     PHYSICAL EXAM:     Patient Vitals for the past 24 hrs:   BP Temp Temp src Pulse Resp SpO2 Weight   05/02/19 1055 (!) 180/92 97.3 °F (36.3 °C) Temporal 74 23 96 % --   05/02/19 0813 (!) 178/82 97.7 °F (36.5 °C) Temporal 82 22 92 % --   05/02/19 0657 -- -- -- -- -- -- 280 lb 1.6 oz (127.1 kg)   05/01/19 2315 (!) 158/76 98.7 °F (37.1 °C) Temporal 80 18 93 % --   05/01/19 2054 (!) 168/74 -- -- -- -- -- --   05/01/19 1532 (!) 180/74 99.6 °F (37.6 °C) Temporal 87 20 92 % --   05/01/19 1402 (!) 174/78 -- -- 85 -- -- --   @      Intake/Output Summary (Last 24 hours) at 5/2/2019 1137  Last data filed at 5/2/2019 1103  Gross per 24 hour   Intake 405.2 ml   Output 1400 ml   Net -994.8 ml         Wt Readings from Last 3 Encounters:   05/02/19 280 lb 1.6 oz (127.1 kg)   11/23/18 275 lb (124.7 kg)   06/23/16 (!) 325 lb 14.4 oz (147.8 kg)       Constitutional:  Pt is in no acute distress  Head: normocephalic, atraumatic  Neck: no JVD  Cardiovascular: regular rate and rhythm, no murmurs, gallops, or rubs  Respiratory:  No rales, rhochi, or wheezes  Gastrointestinal:  Soft, nontender, nondistended, bowel sounds x 4  Ext: tr edema  Skin: dry, no rash  Neuro: aaox3    MEDS (scheduled):    miconazole nitrate   Topical BID    predniSONE  20 mg Oral Daily    guaiFENesin  400 mg Oral TID    dextrose  25 g Intravenous Once    amLODIPine  10 mg Oral Daily    calcitRIOL  0.25 mcg Oral Daily    docusate sodium  100 mg Oral Daily    ferrous sulfate  325 mg Oral BID WC    gabapentin  300 mg Oral TID    insulin lispro  0-18 Units Subcutaneous TID WC    insulin lispro  0-9 Units Subcutaneous Nightly    cefTRIAXone (ROCEPHIN) IV  1 g Intravenous Q24H    patiromer sorbitex calcium  8.4 g Oral Daily    sodium chloride flush  10 mL Intravenous 2 times per day    warfarin (COUMADIN) daily dosing (placeholder)   Other RX Placeholder    hydrALAZINE  25 mg Oral TID       MEDS (infusions):   dextrose Stopped (04/30/19 1708)    heparin (porcine) 9.5 Units/kg/hr (05/02/19 0726)       MEDS (prn):  albuterol, HYDROcodone-acetaminophen, magnesium hydroxide, ondansetron, acetaminophen, glucose, dextrose, glucagon (rDNA), dextrose, guaiFENesin-dextromethorphan, sodium chloride flush, perflutren lipid microspheres, heparin (porcine), heparin (porcine)    DATA:    Recent Labs     04/30/19  0802 05/02/19  0616   WBC  --  12.3*   HGB  --  8.6*   HCT  --  27.2*   MCV  --  99.3    249     Recent Labs     05/02/19  0616      K 5.4*      CO2 19*   BUN 40*   CREATININE 3.1*   LABGLOM 15   GLUCOSE 215*   CALCIUM 8.9   ALT 12   AST 29   BILITOT <0.2   ALKPHOS 80       Lab Results   Component Value Date    LABALBU 3.0 (L) 05/02/2019

## 2019-05-03 VITALS
TEMPERATURE: 97.6 F | HEIGHT: 61 IN | SYSTOLIC BLOOD PRESSURE: 158 MMHG | BODY MASS INDEX: 52.34 KG/M2 | RESPIRATION RATE: 18 BRPM | OXYGEN SATURATION: 97 % | WEIGHT: 277.2 LBS | HEART RATE: 77 BPM | DIASTOLIC BLOOD PRESSURE: 84 MMHG

## 2019-05-03 PROBLEM — J96.01 ACUTE RESPIRATORY FAILURE WITH HYPOXIA (HCC): Status: RESOLVED | Noted: 2019-04-28 | Resolved: 2019-05-03

## 2019-05-03 LAB
APTT: 61.1 SEC (ref 24.5–35.1)
BLOOD CULTURE, ROUTINE: NORMAL
BLOOD CULTURE, ROUTINE: NORMAL
CULTURE, BLOOD 2: NORMAL
CULTURE, BLOOD 2: NORMAL
INR BLD: 2.9
METER GLUCOSE: 167 MG/DL (ref 74–99)
PROTHROMBIN TIME: 32.6 SEC (ref 9.3–12.4)

## 2019-05-03 PROCEDURE — 6360000002 HC RX W HCPCS: Performed by: FAMILY MEDICINE

## 2019-05-03 PROCEDURE — 6360000002 HC RX W HCPCS: Performed by: INTERNAL MEDICINE

## 2019-05-03 PROCEDURE — 6370000000 HC RX 637 (ALT 250 FOR IP): Performed by: INTERNAL MEDICINE

## 2019-05-03 PROCEDURE — 85730 THROMBOPLASTIN TIME PARTIAL: CPT

## 2019-05-03 PROCEDURE — 6370000000 HC RX 637 (ALT 250 FOR IP): Performed by: FAMILY MEDICINE

## 2019-05-03 PROCEDURE — 6370000000 HC RX 637 (ALT 250 FOR IP): Performed by: NURSE PRACTITIONER

## 2019-05-03 PROCEDURE — 94150 VITAL CAPACITY TEST: CPT

## 2019-05-03 PROCEDURE — 82962 GLUCOSE BLOOD TEST: CPT

## 2019-05-03 PROCEDURE — 85610 PROTHROMBIN TIME: CPT

## 2019-05-03 PROCEDURE — 2580000003 HC RX 258: Performed by: FAMILY MEDICINE

## 2019-05-03 PROCEDURE — 36415 COLL VENOUS BLD VENIPUNCTURE: CPT

## 2019-05-03 RX ORDER — ALBUTEROL SULFATE 1.25 MG/3ML
0.63 SOLUTION RESPIRATORY (INHALATION) EVERY 6 HOURS PRN
Qty: 360 ML | Refills: 3 | Status: ON HOLD | DISCHARGE
Start: 2019-05-03 | End: 2019-05-30

## 2019-05-03 RX ORDER — CEFDINIR 300 MG/1
300 CAPSULE ORAL 2 TIMES DAILY
Qty: 14 CAPSULE | Refills: 0 | DISCHARGE
Start: 2019-05-03 | End: 2019-05-10

## 2019-05-03 RX ORDER — PREDNISONE 10 MG/1
10 TABLET ORAL DAILY
Status: DISCONTINUED | OUTPATIENT
Start: 2019-05-04 | End: 2019-05-03 | Stop reason: HOSPADM

## 2019-05-03 RX ORDER — ALPRAZOLAM 0.25 MG/1
0.25 TABLET ORAL EVERY 8 HOURS PRN
Status: SHIPPED | DISCHARGE
Start: 2019-05-03 | End: 2019-06-02

## 2019-05-03 RX ORDER — HYDROCODONE BITARTRATE AND ACETAMINOPHEN 5; 325 MG/1; MG/1
1 TABLET ORAL EVERY 6 HOURS PRN
Qty: 28 TABLET | Refills: 0 | Status: SHIPPED | OUTPATIENT
Start: 2019-05-03 | End: 2019-07-18

## 2019-05-03 RX ORDER — WARFARIN SODIUM 4 MG/1
4 TABLET ORAL DAILY
DISCHARGE
Start: 2019-05-03 | End: 2019-07-18

## 2019-05-03 RX ORDER — HYDRALAZINE HYDROCHLORIDE 50 MG/1
50 TABLET, FILM COATED ORAL 3 TIMES DAILY
Qty: 90 TABLET | Refills: 3 | Status: ON HOLD | DISCHARGE
Start: 2019-05-03 | End: 2020-05-27 | Stop reason: HOSPADM

## 2019-05-03 RX ORDER — GUAIFENESIN/DEXTROMETHORPHAN 100-10MG/5
5 SYRUP ORAL EVERY 4 HOURS PRN
Qty: 120 ML | DISCHARGE
Start: 2019-05-03 | End: 2019-05-13

## 2019-05-03 RX ADMIN — FERROUS SULFATE TAB 325 MG (65 MG ELEMENTAL FE) 325 MG: 325 (65 FE) TAB at 09:17

## 2019-05-03 RX ADMIN — CALCITRIOL 0.25 MCG: 0.25 CAPSULE ORAL at 09:17

## 2019-05-03 RX ADMIN — GABAPENTIN 300 MG: 300 CAPSULE ORAL at 09:17

## 2019-05-03 RX ADMIN — INSULIN LISPRO 3 UNITS: 100 INJECTION, SOLUTION INTRAVENOUS; SUBCUTANEOUS at 06:58

## 2019-05-03 RX ADMIN — PREDNISONE 20 MG: 20 TABLET ORAL at 09:20

## 2019-05-03 RX ADMIN — PATIROMER 8.4 G: 8.4 POWDER, FOR SUSPENSION ORAL at 09:20

## 2019-05-03 RX ADMIN — DOCUSATE SODIUM 100 MG: 100 CAPSULE, LIQUID FILLED ORAL at 09:17

## 2019-05-03 RX ADMIN — CEFTRIAXONE SODIUM 1 G: 1 INJECTION, POWDER, FOR SOLUTION INTRAMUSCULAR; INTRAVENOUS at 04:25

## 2019-05-03 RX ADMIN — HYDROCODONE BITARTRATE AND ACETAMINOPHEN 1 TABLET: 5; 325 TABLET ORAL at 09:20

## 2019-05-03 RX ADMIN — GUAIFENESIN 400 MG: 400 TABLET ORAL at 09:17

## 2019-05-03 RX ADMIN — HYDRALAZINE HYDROCHLORIDE 10 MG: 20 INJECTION INTRAMUSCULAR; INTRAVENOUS at 00:22

## 2019-05-03 RX ADMIN — HYDROCODONE BITARTRATE AND ACETAMINOPHEN 1 TABLET: 5; 325 TABLET ORAL at 00:21

## 2019-05-03 RX ADMIN — Medication: at 09:18

## 2019-05-03 RX ADMIN — AMLODIPINE BESYLATE 10 MG: 10 TABLET ORAL at 09:17

## 2019-05-03 RX ADMIN — HYDRALAZINE HYDROCHLORIDE 50 MG: 50 TABLET, FILM COATED ORAL at 09:17

## 2019-05-03 ASSESSMENT — PAIN SCALES - GENERAL
PAINLEVEL_OUTOF10: 0
PAINLEVEL_OUTOF10: 6
PAINLEVEL_OUTOF10: 5

## 2019-05-03 ASSESSMENT — PAIN DESCRIPTION - ONSET: ONSET: AWAKENED FROM SLEEP

## 2019-05-03 ASSESSMENT — PAIN DESCRIPTION - FREQUENCY: FREQUENCY: CONTINUOUS

## 2019-05-03 ASSESSMENT — PAIN DESCRIPTION - ORIENTATION: ORIENTATION: RIGHT;LEFT

## 2019-05-03 ASSESSMENT — PAIN DESCRIPTION - PROGRESSION: CLINICAL_PROGRESSION: GRADUALLY WORSENING

## 2019-05-03 ASSESSMENT — PAIN DESCRIPTION - DESCRIPTORS: DESCRIPTORS: ACHING;CRUSHING;DULL;DISCOMFORT

## 2019-05-03 ASSESSMENT — PAIN DESCRIPTION - LOCATION: LOCATION: KNEE

## 2019-05-03 ASSESSMENT — PAIN DESCRIPTION - PAIN TYPE: TYPE: CHRONIC PAIN

## 2019-05-03 NOTE — PROGRESS NOTES
Shanti Liu M.D.,[facility]  Kimberlee Fischer D.O., F.A.C.O.I., Marcelino Delarosa M.D. Vick Britton M.D., Vi Engel M.D. Daily Pulmonary Progress Note    Patient:  Len Evans 70 y.o. female MRN: [id number]     Date of Service: 5/3/2019      Synopsis     We are following patient for RLL CAP, possible ALISON PE, stage I pulmonary sarcoid    \"CC\" cough    Code status: FULL      Subjective      Patient was seen and examined lying in the bed in no acute distress. She continues with + moist cough, using flutter valve, having easier time with expectorating sputum. I Off oxygen at present. No fevers. Continues on IV heparin infusion with nomogram, coumadin is therapeutic today, she will be discharging to [facility] today. Review of Systems:  Constitutional:  +fatigue and weakness improving slowly  Skin: Denies pigmentation, dark lesions, and rashes   HEENT: Denies hearing loss, tinnitus, ear drainage, epistaxis, sore throat, and hoarseness. Cardiovascular: Denies palpitations, chest pain, and chest pressure. Respiratory: Denies cough, dyspnea at rest, hemoptysis, apnea, and choking.   Gastrointestinal: Denies nausea, vomiting, poor appetite, diarrhea, heartburn or reflux  Genitourinary: Denies dysuria, frequency, urgency or hematuria  Musculoskeletal: Denies myalgias, muscle weakness, and bone pain  Neurological: Denies dizziness, vertigo, headache, and focal weakness  Psychological: Denies anxiety and depression  Endocrine: Denies heat intolerance and cold intolerance  Hematopoietic/Lymphatic: Denies bleeding problems and blood transfusions    24-hour events:  None    Objective   Vitals: BP (!) 158/84   Pulse 80   Temp 97.9 °F (36.6 °C) (Temporal)   Resp 20   Ht 5' 1\" (1.549 m)   Wt 277 lb 3.2 oz (125.7 kg)   SpO2 94%   BMI 52.38 kg/m²     I/O:    Intake/Output Summary (Last 24 hours) at 5/3/2019 1016  Last data filed at 5/3/2019 1001  Gross per 24 hour   Intake 571 ml hours. No results for input(s): PROCAL in the last 72 hours. This SmartLink has not been configured with any valid records. Micro:  No results for input(s): CULTRESP in the last 72 hours. No results for input(s): LABGRAM in the last 72 hours. No results for input(s): LEGUR in the last 72 hours. No results for input(s): STREPNEUMAGU in the last 72 hours. No results for input(s): LP1UAG in the last 72 hours. 4/29/19 Respiratory culture  Group 5: >25 PMN's/LPF and <10 Epithelial cells/LPF   Abundant Polymorphonuclear leukocytes   Epithelial cells not seen   Rare Gram positive cocci      Assessment:    1. Right lower lobe community-acquired pneumonia. 2.  Possible left upper lobe pulmonary embolism as demonstrated by a  perfusion defect on V/Q. The perfusion defect that corresponds to the  right lower lobe is associated most likely with a right lower lobe  pneumonia. 3.  Chronic kidney disease. 4.  Morbid obesity. 5.  History of stage I sarcoid. Plan:   1. Oxygen therapy wean off to keep >92%  2. Check ambulatory O2 testing prior to dc home  3. Antibiotics for CAP-rocephin 4/29/19  4. Echo with no RV strain   5. US BLE negative for dvt  6. IV heparin gtt with Transition to Coumadin. INR 2.9 dosing per pharmacy  7. Follow CXR today  8. Continue flutter valve, mucinex TID to assist with mucus clearance  9. Prednisone decreased to 10 mg daily for 3 days, then stop, Xopenex every 6 hours as needed for wheezing and shortness of breath. 10. Blood glucose management per primary team   11. Renal following for mgmt of CKD  12. Outpatient PFTs  13. PT/OT, increase activity as tolerated need special shoes from home to ambulate with assistance. 14. Anticipating discharge to [facility] today    This plan of care was reviewed in collaboration with Dr. Pako Bowen  Electronically signed by PABLO Page CNP on 5/3/2019 at 10:16 AM    Addendum:  Patient seen and examined. On room air.   Feeling much better. .  INR is therapeutic today. Patient is stable to be discharged from a pulmonary standpoint to [facility]. Will need prednisone 10 mg for 3 more days. Continue Xopenex every 6 hours p.r.n. Follow-up in office with me in 2- 3 months. I personally saw, examined, and cared for the patient. Labs, medications, radiographs reviewed. I agree with history exam and plans detailed in NP note.     Mal Cavazos MD

## 2019-05-03 NOTE — DISCHARGE INSTR - COC
Continuity of Care Form    Patient Name: Dea Gonzalez   :  1950  MRN:  [id number]    Admit date:  2019  Discharge date:  [id number]    Code Status Order: Full Code   Advance Directives:   [facility] Documentation     Date/Time Healthcare Directive Type of Healthcare Directive Copy in [address]  [address] Agent's Name Healthcare Agent's Phone Number    [phone]  No, patient does not have an advance directive for healthcare treatment -- -- -- -- --          Admitting Physician:  Susannah Hyde MD  PCP: Susannah Hyde MD    Discharging Nurse: Janessa Blood [facility] Unit/Room#: 3312/9149-U  Discharging Unit Phone Number: [phone]    Emergency Contact:   Extended Emergency Contact Information  Primary Emergency Contact: Johnson Mcguire   [address] Phone: [phone]  Relation: Child  Secondary Emergency Contact: Catherine [address] Phone: [phone]  Relation: Child    Past Surgical History:  Past Surgical History:   Procedure Laterality Date     SECTION  x3    CHOLECYSTECTOMY  3/31/16    Laparoscopic-Dr. Michael Ga    CYSTOSCOPY       for kidney stones    FOOT SURGERY  2009     right     UPPER GASTROINTESTINAL ENDOSCOPY  2.18.15    Dr. Malik Sides Findings: Mild Gastrits and Duodenitis, 2cm Hiatal Hernia       Immunization History: There is no immunization history for the selected administration types on file for this patient.     Active Problems:  Patient Active Problem List   Diagnosis Code    Neurologic gait dysfunction R26.9    Cellulitis L03.90    Renal failure, acute on chronic (HCC) N17.9, N18.9    Diabetes mellitus ([facility] [address] E11.9    Hypertension I10    Sarcoidosis D86.9    Arthritis M19.90    Kidney disease N28.9    Acute cholecystitis K81.0    Anemia due to GI blood loss D50.0    GI bleed K92.2    Knee problem M25.9    Displaced fracture of medial condyle of left femur, initial encounter for closed fracture ([facility] [address] S72.432A    Anemia due to stage 3 chronic kidney disease (HCC) N18.3, D63.1    Pneumonia, bacterial J15.9    Acute pulmonary embolism (HCC) I26.99       Isolation/Infection:   Isolation          No Isolation            Nurse Assessment:  Last Vital Signs: BP (!) 144/72 Comment: manual  Pulse 80   Temp 97.9 °F (36.6 °C) (Temporal)   Resp 20   Ht 5' 1\" (1.549 m)   Wt 277 lb 3.2 oz (125.7 kg)   SpO2 94%   BMI 52.38 kg/m²     Last documented pain score (0-10 scale): Pain Level: 5  Last Weight:   Wt Readings from Last 1 Encounters:   05/03/19 277 lb 3.2 oz (125.7 kg)     Mental Status:  alert    IV Access:  - None    Nursing Mobility/ADLs:  Walking   Assisted  Transfer  Assisted  Bathing  Assisted  Dressing  Assisted  Toileting  Assisted  Feeding  Assisted  Med Admin  Assisted  Med Delivery   none    Wound Care Documentation and Therapy:  Incision 03/31/16 Abdomen Mid;Right (Active)   Number of days: 1127        Elimination:  Continence:   · Bowel: Yes  · Bladder: Yes  Urinary Catheter: None   Colostomy/Ileostomy/Ileal Conduit: No       Date of Last BM: [id number]    Intake/Output Summary (Last 24 hours) at 5/3/2019 0912  Last data filed at 5/3/2019 0525  Gross per 24 hour   Intake 391 ml   Output 1050 ml   Net -659 ml     I/O last 3 completed shifts: In: 391 [P.O.:120; I.V.:271]  Out: 1050 [Urine:1050]    Safety Concerns:     None    Impairments/Disabilities:      None    Nutrition Therapy:  Current Nutrition Therapy:   - Oral Diet:  Renal    Routes of Feeding: Oral  Liquids: Thin Liquids  Daily Fluid Restriction: no  Last Modified Barium Swallow with Video (Video Swallowing Test): not done    Treatments at the Time of Hospital Discharge:   Respiratory Treatments:   Oxygen Therapy:  is not on home oxygen therapy.   Ventilator:    - No ventilator support    Rehab Therapies: Physical Therapy  Weight Bearing Status/Restrictions: No weight bearing restirctions  Other Medical Equipment (for information only, NOT a DME order):  walker  Other Treatments:     Patient's personal belongings (please select all that are sent with patient):  Glasses    RN SIGNATURE:  Electronically signed by Alber Arroyo RN on 5/3/19 at 10:42 AM    CASE MANAGEMENT/SOCIAL WORK SECTION    Inpatient Status Date: [id number]    Readmission Risk Assessment Score:  Readmission Risk              Risk of Unplanned Readmission:        25           Discharging to Facility/ Agency   · Name:   · Address:  · Phone:  · Fax:    Dialysis Facility (if applicable)   · Name:  · Address:  · Dialysis Schedule:  · Phone:  · Fax:    / signature: {Esignature:[id number]}    PHYSICIAN SECTION    Prognosis: Good    Condition at Discharge: Stable    Rehab Potential (if transferring to Rehab): Good    Recommended Labs or Other Treatments After Discharge: cbc cmp on admission and Q week. INR ON ADMISSION AND DAILY ON COUMADI KEEP BETWEEN 2-3    Physician Certification: I certify the above information and transfer of Tabitha Ruano  is necessary for the continuing treatment of the diagnosis listed and that she requires [facility] for less 30 days.      Update Admission H&P: No change in H&P    PHYSICIAN SIGNATURE:  Electronically signed by Anmol Cobb MD on 5/3/19 at 9:14 AM

## 2019-05-03 NOTE — PROGRESS NOTES
Pharmacy Consultation Note  (Warfarin Dosing and Monitoring)    Initial consult date: 4/28/19  Consulting physician: Dr. Vladimir Bradshaw    Allergies:  Patient has no known allergies. Warfarin Indication Target   INR Range Home Dose  (if applicable)   Diet/Feeding Tube   Pulmonary Embolism 2 - 3 N/A 4/28: renal     Warfarin Drug Interactions Start Stop Comment   doxycycline 4/29 4/30 may increase INR          TSH: 1.945 (8-)   Hepatic Function Panel:                            Lab Results   Component Value Date    ALKPHOS 80 05/02/2019    ALT 12 05/02/2019    AST 29 05/02/2019    PROT 6.3 05/02/2019    BILITOT <0.2 05/02/2019    BILIDIR 0.8 06/25/2011    LABALBU 3.0 05/02/2019    LABALBU 4.1 10/12/2011     Date Warfarin Dose INR Heparin or LMWH HBG/HCT PLT Comment   4/28 5 mg 1.1 Heparin bridge to start 8.4/27.6 196    4/29 5 mg 1 UFH infusion 7.9/25.7 170    4/30 5 mg 1 UFH infusion X 225    5/1 7.5 mg 1.2 UFH infusion X X    5/2 7.5 mg 1.6 UFH infusion 8.6/27.2 249    5/3 No Dose 2.9 UFH infusion                 Assessment:  · Marisela Man is a 70 y.o./F; 154.9 cm, 130.6 kg who initially presented for evaluation of RLL CAP and possible ALISON PE.    · Pharmacy was consulted to dose/monitor warfarin and initiate heparin for bridging. The patient is a new start on warfarin and has a PMH significant for CKD and morbid obesity. · 4/29: INR = 1; ATB change today from azithromycin (d/t long QTc) to doxycycline. · Significant drug-drug interaction between warfarin and doxycyline. · 4/30: INR = 1; day #3 overlap warfarin-UFH infusion. · 5/1: INR = 1.2; day #4 overlap warfarin-UFH. APTT therapeutic on current rate. Doxycycline stopped on 4/30 d/t pt c/o nausea. · 5/2: INR = 1.6; day #5 overlap. UFH rate decreased @ 0726 d/t aPTT = 98.5 @ 0616. · 5/3: INR = 2.9; significant increase d/t 7.5 mg doses. Plan:  · HOLD warfarin dose today.   · Resume dosing with 5 mg on 5/4 if INR < 3.1   · Recommend stopping UFH infusion today. · Daily PT/INR until the INR is stable within the therapeutic range. · Pharmacist will follow and monitor/adjust dosing as necessary. Patrick Martinez PharmD  5/3/2019  7:56 AM  Pager: [phone]    Pending discharge today. Recommend HOLDING warfarin dose tonight. Resume lower dose (5 mg) if/when INR < 3.1 on 5/4 or later.     Patrick Martinez PharmD  5/3/2019  10:57 AM  Pager: [phone]

## 2019-05-03 NOTE — PROGRESS NOTES
Subjective: The patient is awake and alert. No problems overnight. Denies chest pain, angina, and dyspnea. Denies abdominal pain. Tolerating diet. No nausea or vomiting. Objective:    BP (!) 144/72 Comment: manual  Pulse 80   Temp 97.9 °F (36.6 °C) (Temporal)   Resp 20   Ht 5' 1\" (1.549 m)   Wt 277 lb 3.2 oz (125.7 kg)   SpO2 94%   BMI 52.38 kg/m²     Heart:  RRR, no murmurs, gallops, or rubs.   Lungs:  CTA bilaterally, no wheeze, rales or rhonchi  Abd: bowel sounds present, nontender, nondistended, no masses  Extrem:  No clubbing, cyanosis, or edema    CBC with Differential:    Lab Results   Component Value Date    WBC 12.3 05/02/2019    RBC 2.74 05/02/2019    HGB 8.6 05/02/2019    HCT 27.2 05/02/2019     05/02/2019    MCV 99.3 05/02/2019    MCH 31.4 05/02/2019    MCHC 31.6 05/02/2019    RDW 13.2 05/02/2019    SEGSPCT 71 08/16/2013    BANDSPCT 1 03/29/2016    METASPCT 1 07/03/2011    LYMPHOPCT 10.1 05/02/2019    MONOPCT 6.6 05/02/2019    MYELOPCT 1 06/26/2011    BASOPCT 0.5 05/02/2019    MONOSABS 0.81 05/02/2019    LYMPHSABS 1.24 05/02/2019    EOSABS 0.05 05/02/2019    BASOSABS 0.06 05/02/2019     CMP:    Lab Results   Component Value Date     05/02/2019    K 5.4 05/02/2019    K 4.7 04/29/2019     05/02/2019    CO2 19 05/02/2019    BUN 40 05/02/2019    CREATININE 3.1 05/02/2019    GFRAA 18 05/02/2019    LABGLOM 15 05/02/2019    GLUCOSE 215 05/02/2019    GLUCOSE 149 10/12/2011    PROT 6.3 05/02/2019    LABALBU 3.0 05/02/2019    LABALBU 4.1 10/12/2011    CALCIUM 8.9 05/02/2019    BILITOT <0.2 05/02/2019    ALKPHOS 80 05/02/2019    AST 29 05/02/2019    ALT 12 05/02/2019        Assessment:    Patient Active Problem List   Diagnosis    Neurologic gait dysfunction    Cellulitis    [facility]al failure, acute on chronic ([facility] [address]    Diabetes mellitus ([facility] [address]    Hypertension    Sarcoidosis    Arthritis    Kidney disease    Acute cholecystitis    Anemia due to GI blood loss    GI bleed    Knee problem    Displaced fracture of medial condyle of left femur, initial encounter for closed fracture (HCC)    Anemia due to stage 3 chronic kidney disease ([facility] [address]    Pneumonia, bacterial    Acute pulmonary embolism ([facility] [address]       Plan:  D/C        Vinita Chau  9:11 AM  5/3/2019

## 2019-05-03 NOTE — PROGRESS NOTES
[facility]  Nephrology Attending Progress Note  Nicole Collins.  Augusto Ross MD        SUBJECTIVE:     4/29: coughing, developing edema  4/30: continues with coughing  5/1: coughing improving slowly  5/2: no new complaints, eating well  5/3: no new complaints, bp high overnight, has anxiety      PROBLEM LIST:    Patient Active Problem List   Diagnosis    Neurologic gait dysfunction    Cellulitis    Renal failure, acute on chronic ([address]    Diabetes mellitus ([address]    Hypertension    Sarcoidosis    Arthritis    Kidney disease    Acute cholecystitis    Anemia due to GI blood loss    GI bleed    Knee problem    Displaced fracture of medial condyle of left femur, initial encounter for closed fracture (HCC)    Anemia due to stage 3 chronic kidney disease ([address]    Pneumonia, bacterial    Acute pulmonary embolism ([address]        PAST MEDICAL HISTORY:    Past Medical History:   Diagnosis Date    Arthritis     knees    Chronic knee pain     Depression     Diabetes mellitus (HCC)     type 2    Gall stones     Hypertension     Kidney disease     Kidney stones     Obesity     Sarcoidosis     SOBOE 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       DIET:    DIET RENAL;     PHYSICAL EXAM:     Patient Vitals for the past 24 hrs:   BP Temp Temp src Pulse Resp SpO2 Weight   05/03/19 0917 (!) 158/84 97.6 °F (36.4 °C) Temporal 77 18 97 % --   05/03/19 0525 -- -- -- -- -- -- 277 lb 3.2 oz (125.7 kg)   05/03/19 0226 (!) 144/72 -- -- -- -- -- --   05/03/19 0015 (!) 166/82 -- -- 80 -- -- --   05/02/19 2345 (!) 170/70 97.9 °F (36.6 °C) Temporal 78 20 94 % --   05/02/19 2145 (!) 168/84 -- -- 84 -- -- --   05/02/19 1930 (!) 176/74 98.5 °F (36.9 °C) Temporal 92 22 95 % --   05/02/19 1711 (!) 190/110 -- -- -- -- -- --   05/02/19 1407 (!) 190/100 98.2 °F (36.8 °C) Temporal 101 24 96 % --   @      Intake/Output Summary (Last 24 hours) at 5/3/2019 1244  Last data filed at 5/3/2019 AST 29   BILITOT <0.2   ALKPHOS 80       Lab Results   Component Value Date    LABALBU 3.0 (L) 05/02/2019    LABALBU 2.8 (L) 04/29/2019    LABALBU 3.0 (L) 11/27/2018     Lab Results   Component Value Date    TSH 1.945 08/16/2013     No results found for: PHART, PO2ART, UQO8TVU    Iron Studies:   Lab Results   Component Value Date    IRON 120 05/02/2019    TIBC 246 (L) 05/02/2019    FERRITIN 362 05/02/2019         IMPRESSION/RECOMMENDATIONS:      1. Chronic kidney disease at stage 5 current GFR 14 cc/min/1.73 m2   diabetic  Nephropathy  renal function at baseline however associated Hyperkalemia and metab acidosis   She remains non oliguric  veltassa to normalize K  monitor her volume status and consider diuretics as needed  she has chronic  edema    2. Juvencio pneumonia - possible post viral infection based on history , feels better on AB     3. Morbid Obesity , chronic edema and skin changes juvencio lower extremity      4. H/O sarcoid      5. Anemia of ckd check iron, replace if low and consider ARUN if HB drops further And once infection clears      6. Suspected PE as high DDimers and recent long car drive - V/Q intermediate prob , per pulm    7. Hyperkalemia  On veltassa  Follow for today  Low k diet    8. htn  Increase hydralazine again today                                            Felicie Model.  Jalen Romero MD

## 2019-05-03 NOTE — CARE COORDINATION
5/3/2019 social work:discharge planning   Patient to discharge to [facility] today at 11:30 am via [facility]. patient aware of potential cost and notified of time. patient wishes to notifiy family.

## 2019-05-29 ENCOUNTER — APPOINTMENT (OUTPATIENT)
Dept: GENERAL RADIOLOGY | Age: 69
DRG: 204 | End: 2019-05-29
Payer: MEDICARE

## 2019-05-29 ENCOUNTER — APPOINTMENT (OUTPATIENT)
Dept: CT IMAGING | Age: 69
DRG: 204 | End: 2019-05-29
Payer: MEDICARE

## 2019-05-29 ENCOUNTER — HOSPITAL ENCOUNTER (INPATIENT)
Age: 69
LOS: 3 days | Discharge: HOME HEALTH | DRG: 204 | End: 2019-06-02
Attending: EMERGENCY MEDICINE | Admitting: FAMILY MEDICINE
Payer: MEDICARE

## 2019-05-29 DIAGNOSIS — R09.02 HYPOXIA: Primary | ICD-10-CM

## 2019-05-29 DIAGNOSIS — I50.9 ACUTE CONGESTIVE HEART FAILURE, UNSPECIFIED HEART FAILURE TYPE (HCC): ICD-10-CM

## 2019-05-29 DIAGNOSIS — R19.7 DIARRHEA, UNSPECIFIED TYPE: ICD-10-CM

## 2019-05-29 PROCEDURE — 74176 CT ABD & PELVIS W/O CONTRAST: CPT

## 2019-05-29 PROCEDURE — 85610 PROTHROMBIN TIME: CPT

## 2019-05-29 PROCEDURE — 84484 ASSAY OF TROPONIN QUANT: CPT

## 2019-05-29 PROCEDURE — 93005 ELECTROCARDIOGRAM TRACING: CPT | Performed by: EMERGENCY MEDICINE

## 2019-05-29 PROCEDURE — 83690 ASSAY OF LIPASE: CPT

## 2019-05-29 PROCEDURE — 85025 COMPLETE CBC W/AUTO DIFF WBC: CPT

## 2019-05-29 PROCEDURE — 71045 X-RAY EXAM CHEST 1 VIEW: CPT

## 2019-05-29 PROCEDURE — 80053 COMPREHEN METABOLIC PANEL: CPT

## 2019-05-29 PROCEDURE — 99285 EMERGENCY DEPT VISIT HI MDM: CPT

## 2019-05-29 PROCEDURE — 83880 ASSAY OF NATRIURETIC PEPTIDE: CPT

## 2019-05-29 PROCEDURE — 83735 ASSAY OF MAGNESIUM: CPT

## 2019-05-29 PROCEDURE — 36415 COLL VENOUS BLD VENIPUNCTURE: CPT

## 2019-05-29 RX ORDER — BENZONATATE 100 MG/1
100 CAPSULE ORAL ONCE
Status: COMPLETED | OUTPATIENT
Start: 2019-05-29 | End: 2019-05-30

## 2019-05-29 RX ORDER — IPRATROPIUM BROMIDE AND ALBUTEROL SULFATE 2.5; .5 MG/3ML; MG/3ML
1 SOLUTION RESPIRATORY (INHALATION)
Status: DISPENSED | OUTPATIENT
Start: 2019-05-29 | End: 2019-05-29

## 2019-05-30 PROBLEM — R09.02 HYPOXIA: Status: ACTIVE | Noted: 2019-05-30

## 2019-05-30 LAB
ALBUMIN SERPL-MCNC: 3.6 G/DL (ref 3.5–5.2)
ALP BLD-CCNC: 78 U/L (ref 35–104)
ALT SERPL-CCNC: 12 U/L (ref 0–32)
ANION GAP SERPL CALCULATED.3IONS-SCNC: 9 MMOL/L (ref 7–16)
AST SERPL-CCNC: 14 U/L (ref 0–31)
BACTERIA: ABNORMAL /HPF
BASOPHILS ABSOLUTE: 0.06 E9/L (ref 0–0.2)
BASOPHILS RELATIVE PERCENT: 0.6 % (ref 0–2)
BILIRUB SERPL-MCNC: <0.2 MG/DL (ref 0–1.2)
BILIRUBIN URINE: NEGATIVE
BLOOD, URINE: ABNORMAL
BUN BLDV-MCNC: 51 MG/DL (ref 8–23)
CALCIUM SERPL-MCNC: 8.5 MG/DL (ref 8.6–10.2)
CHLORIDE BLD-SCNC: 114 MMOL/L (ref 98–107)
CLARITY: ABNORMAL
CO2: 23 MMOL/L (ref 22–29)
COLOR: YELLOW
CREAT SERPL-MCNC: 3.3 MG/DL (ref 0.5–1)
EKG ATRIAL RATE: 84 BPM
EKG P AXIS: 71 DEGREES
EKG P-R INTERVAL: 200 MS
EKG Q-T INTERVAL: 442 MS
EKG QRS DURATION: 130 MS
EKG QTC CALCULATION (BAZETT): 522 MS
EKG R AXIS: -47 DEGREES
EKG T AXIS: 95 DEGREES
EKG VENTRICULAR RATE: 84 BPM
EOSINOPHILS ABSOLUTE: 0.55 E9/L (ref 0.05–0.5)
EOSINOPHILS RELATIVE PERCENT: 5.1 % (ref 0–6)
EPITHELIAL CELLS, UA: ABNORMAL /HPF
FILM ARRAY ADENOVIRUS: ABNORMAL
FILM ARRAY BORDETELLA PERTUSSIS: ABNORMAL
FILM ARRAY CHLAMYDOPHILIA PNEUMONIAE: ABNORMAL
FILM ARRAY CORONAVIRUS 229E: ABNORMAL
FILM ARRAY CORONAVIRUS HKU1: ABNORMAL
FILM ARRAY CORONAVIRUS NL63: ABNORMAL
FILM ARRAY CORONAVIRUS OC43: ABNORMAL
FILM ARRAY INFLUENZA A VIRUS 09H1: ABNORMAL
FILM ARRAY INFLUENZA A VIRUS H1: ABNORMAL
FILM ARRAY INFLUENZA A VIRUS H3: ABNORMAL
FILM ARRAY INFLUENZA A VIRUS: ABNORMAL
FILM ARRAY INFLUENZA B: ABNORMAL
FILM ARRAY METAPNEUMOVIRUS: ABNORMAL
FILM ARRAY MYCOPLASMA PNEUMONIAE: ABNORMAL
FILM ARRAY PARAINFLUENZA VIRUS 1: ABNORMAL
FILM ARRAY PARAINFLUENZA VIRUS 2: ABNORMAL
FILM ARRAY PARAINFLUENZA VIRUS 3: ABNORMAL
FILM ARRAY PARAINFLUENZA VIRUS 4: ABNORMAL
FILM ARRAY RESPIRATORY SYNCITIAL VIRUS: ABNORMAL
GFR AFRICAN AMERICAN: 17
GFR NON-AFRICAN AMERICAN: 14 ML/MIN/1.73
GLUCOSE BLD-MCNC: 163 MG/DL (ref 74–99)
GLUCOSE URINE: NEGATIVE MG/DL
HCT VFR BLD CALC: 30.6 % (ref 34–48)
HEMOGLOBIN: 9.3 G/DL (ref 11.5–15.5)
IMMATURE GRANULOCYTES #: 0.14 E9/L
IMMATURE GRANULOCYTES %: 1.3 % (ref 0–5)
INR BLD: 1.6
KETONES, URINE: NEGATIVE MG/DL
LEUKOCYTE ESTERASE, URINE: ABNORMAL
LIPASE: 24 U/L (ref 13–60)
LYMPHOCYTES ABSOLUTE: 1.15 E9/L (ref 1.5–4)
LYMPHOCYTES RELATIVE PERCENT: 10.6 % (ref 20–42)
MAGNESIUM: 2.1 MG/DL (ref 1.6–2.6)
MCH RBC QN AUTO: 30.2 PG (ref 26–35)
MCHC RBC AUTO-ENTMCNC: 30.4 % (ref 32–34.5)
MCV RBC AUTO: 99.4 FL (ref 80–99.9)
METER GLUCOSE: 137 MG/DL (ref 74–99)
METER GLUCOSE: 143 MG/DL (ref 74–99)
MONOCYTES ABSOLUTE: 1.18 E9/L (ref 0.1–0.95)
MONOCYTES RELATIVE PERCENT: 10.9 % (ref 2–12)
NEUTROPHILS ABSOLUTE: 7.78 E9/L (ref 1.8–7.3)
NEUTROPHILS RELATIVE PERCENT: 71.5 % (ref 43–80)
NITRITE, URINE: NEGATIVE
ORGANISM: ABNORMAL
PDW BLD-RTO: 13.3 FL (ref 11.5–15)
PH UA: 6 (ref 5–9)
PLATELET # BLD: 232 E9/L (ref 130–450)
PMV BLD AUTO: 10.1 FL (ref 7–12)
POTASSIUM SERPL-SCNC: 5.2 MMOL/L (ref 3.5–5)
PRO-BNP: 3625 PG/ML (ref 0–125)
PROCALCITONIN: 0.29 NG/ML (ref 0–0.08)
PROCALCITONIN: 0.32 NG/ML (ref 0–0.08)
PROTEIN UA: >=300 MG/DL
PROTHROMBIN TIME: 18.5 SEC (ref 9.3–12.4)
RBC # BLD: 3.08 E12/L (ref 3.5–5.5)
RBC UA: ABNORMAL /HPF (ref 0–2)
SODIUM BLD-SCNC: 146 MMOL/L (ref 132–146)
SPECIFIC GRAVITY UA: 1.02 (ref 1–1.03)
TOTAL PROTEIN: 6.7 G/DL (ref 6.4–8.3)
TROPONIN: 0.02 NG/ML (ref 0–0.03)
UROBILINOGEN, URINE: 0.2 E.U./DL
WBC # BLD: 10.9 E9/L (ref 4.5–11.5)
WBC UA: >20 /HPF (ref 0–5)

## 2019-05-30 PROCEDURE — 87450 HC DIRECT STREP B ANTIGEN: CPT

## 2019-05-30 PROCEDURE — 93010 ELECTROCARDIOGRAM REPORT: CPT | Performed by: INTERNAL MEDICINE

## 2019-05-30 PROCEDURE — 97162 PT EVAL MOD COMPLEX 30 MIN: CPT | Performed by: PHYSICAL THERAPIST

## 2019-05-30 PROCEDURE — 6360000002 HC RX W HCPCS: Performed by: EMERGENCY MEDICINE

## 2019-05-30 PROCEDURE — 87486 CHLMYD PNEUM DNA AMP PROBE: CPT

## 2019-05-30 PROCEDURE — 87581 M.PNEUMON DNA AMP PROBE: CPT

## 2019-05-30 PROCEDURE — 81001 URINALYSIS AUTO W/SCOPE: CPT

## 2019-05-30 PROCEDURE — 97530 THERAPEUTIC ACTIVITIES: CPT | Performed by: PHYSICAL THERAPIST

## 2019-05-30 PROCEDURE — 6370000000 HC RX 637 (ALT 250 FOR IP): Performed by: FAMILY MEDICINE

## 2019-05-30 PROCEDURE — 87798 DETECT AGENT NOS DNA AMP: CPT

## 2019-05-30 PROCEDURE — 97166 OT EVAL MOD COMPLEX 45 MIN: CPT

## 2019-05-30 PROCEDURE — 84145 PROCALCITONIN (PCT): CPT

## 2019-05-30 PROCEDURE — 36415 COLL VENOUS BLD VENIPUNCTURE: CPT

## 2019-05-30 PROCEDURE — 96374 THER/PROPH/DIAG INJ IV PUSH: CPT

## 2019-05-30 PROCEDURE — 6370000000 HC RX 637 (ALT 250 FOR IP): Performed by: EMERGENCY MEDICINE

## 2019-05-30 PROCEDURE — 2580000003 HC RX 258: Performed by: FAMILY MEDICINE

## 2019-05-30 PROCEDURE — 97535 SELF CARE MNGMENT TRAINING: CPT

## 2019-05-30 PROCEDURE — 2060000000 HC ICU INTERMEDIATE R&B

## 2019-05-30 PROCEDURE — 87633 RESP VIRUS 12-25 TARGETS: CPT

## 2019-05-30 PROCEDURE — 82962 GLUCOSE BLOOD TEST: CPT

## 2019-05-30 RX ORDER — ONDANSETRON 2 MG/ML
4 INJECTION INTRAMUSCULAR; INTRAVENOUS EVERY 6 HOURS PRN
Status: DISCONTINUED | OUTPATIENT
Start: 2019-05-30 | End: 2019-06-02 | Stop reason: HOSPADM

## 2019-05-30 RX ORDER — ACETAMINOPHEN 325 MG/1
650 TABLET ORAL ONCE
Status: DISCONTINUED | OUTPATIENT
Start: 2019-05-30 | End: 2019-06-02 | Stop reason: HOSPADM

## 2019-05-30 RX ORDER — DEXTROSE MONOHYDRATE 25 G/50ML
12.5 INJECTION, SOLUTION INTRAVENOUS PRN
Status: DISCONTINUED | OUTPATIENT
Start: 2019-05-30 | End: 2019-06-02 | Stop reason: HOSPADM

## 2019-05-30 RX ORDER — SODIUM CHLORIDE 0.9 % (FLUSH) 0.9 %
10 SYRINGE (ML) INJECTION EVERY 12 HOURS SCHEDULED
Status: DISCONTINUED | OUTPATIENT
Start: 2019-05-30 | End: 2019-06-02 | Stop reason: HOSPADM

## 2019-05-30 RX ORDER — GABAPENTIN 300 MG/1
300 CAPSULE ORAL 3 TIMES DAILY
Status: DISCONTINUED | OUTPATIENT
Start: 2019-05-30 | End: 2019-06-02 | Stop reason: HOSPADM

## 2019-05-30 RX ORDER — HYDRALAZINE HYDROCHLORIDE 50 MG/1
50 TABLET, FILM COATED ORAL 3 TIMES DAILY
Status: DISCONTINUED | OUTPATIENT
Start: 2019-05-30 | End: 2019-06-02 | Stop reason: HOSPADM

## 2019-05-30 RX ORDER — ACETAMINOPHEN 325 MG/1
650 TABLET ORAL EVERY 4 HOURS PRN
Status: DISCONTINUED | OUTPATIENT
Start: 2019-05-30 | End: 2019-06-02 | Stop reason: HOSPADM

## 2019-05-30 RX ORDER — WARFARIN SODIUM 4 MG/1
4 TABLET ORAL DAILY
Status: DISCONTINUED | OUTPATIENT
Start: 2019-05-30 | End: 2019-05-31

## 2019-05-30 RX ORDER — FERROUS SULFATE 325(65) MG
325 TABLET ORAL 2 TIMES DAILY
Status: DISCONTINUED | OUTPATIENT
Start: 2019-05-30 | End: 2019-06-02 | Stop reason: HOSPADM

## 2019-05-30 RX ORDER — DEXTROSE MONOHYDRATE 50 MG/ML
100 INJECTION, SOLUTION INTRAVENOUS PRN
Status: DISCONTINUED | OUTPATIENT
Start: 2019-05-30 | End: 2019-06-02 | Stop reason: HOSPADM

## 2019-05-30 RX ORDER — FUROSEMIDE 10 MG/ML
20 INJECTION INTRAMUSCULAR; INTRAVENOUS ONCE
Status: COMPLETED | OUTPATIENT
Start: 2019-05-30 | End: 2019-05-30

## 2019-05-30 RX ORDER — AMLODIPINE BESYLATE 10 MG/1
10 TABLET ORAL DAILY
Status: DISCONTINUED | OUTPATIENT
Start: 2019-05-30 | End: 2019-06-02 | Stop reason: HOSPADM

## 2019-05-30 RX ORDER — CALCITRIOL 0.25 UG/1
0.25 CAPSULE, LIQUID FILLED ORAL DAILY
Status: DISCONTINUED | OUTPATIENT
Start: 2019-05-30 | End: 2019-06-02 | Stop reason: HOSPADM

## 2019-05-30 RX ORDER — NICOTINE POLACRILEX 4 MG
15 LOZENGE BUCCAL PRN
Status: DISCONTINUED | OUTPATIENT
Start: 2019-05-30 | End: 2019-06-02 | Stop reason: HOSPADM

## 2019-05-30 RX ORDER — ALPRAZOLAM 0.25 MG/1
0.25 TABLET ORAL EVERY 8 HOURS PRN
Status: DISCONTINUED | OUTPATIENT
Start: 2019-05-30 | End: 2019-06-02 | Stop reason: HOSPADM

## 2019-05-30 RX ORDER — GLIMEPIRIDE 2 MG/1
2 TABLET ORAL
Status: DISCONTINUED | OUTPATIENT
Start: 2019-05-30 | End: 2019-06-02 | Stop reason: HOSPADM

## 2019-05-30 RX ORDER — GLIMEPIRIDE 2 MG/1
2 TABLET ORAL DAILY PRN
Status: ON HOLD | COMMUNITY
End: 2019-07-20 | Stop reason: HOSPADM

## 2019-05-30 RX ORDER — SODIUM CHLORIDE 0.9 % (FLUSH) 0.9 %
10 SYRINGE (ML) INJECTION PRN
Status: DISCONTINUED | OUTPATIENT
Start: 2019-05-30 | End: 2019-06-02 | Stop reason: HOSPADM

## 2019-05-30 RX ORDER — HYDROCODONE BITARTRATE AND ACETAMINOPHEN 5; 325 MG/1; MG/1
1 TABLET ORAL EVERY 6 HOURS PRN
Status: DISCONTINUED | OUTPATIENT
Start: 2019-05-30 | End: 2019-06-02 | Stop reason: HOSPADM

## 2019-05-30 RX ADMIN — AMLODIPINE BESYLATE 10 MG: 10 TABLET ORAL at 10:14

## 2019-05-30 RX ADMIN — WARFARIN SODIUM 4 MG: 4 TABLET ORAL at 18:37

## 2019-05-30 RX ADMIN — GLIMEPIRIDE 2 MG: 2 TABLET ORAL at 10:13

## 2019-05-30 RX ADMIN — BENZONATATE 100 MG: 100 CAPSULE ORAL at 00:56

## 2019-05-30 RX ADMIN — FERROUS SULFATE TAB 325 MG (65 MG ELEMENTAL FE) 325 MG: 325 (65 FE) TAB at 20:39

## 2019-05-30 RX ADMIN — Medication 10 ML: at 20:41

## 2019-05-30 RX ADMIN — GABAPENTIN 300 MG: 300 CAPSULE ORAL at 20:39

## 2019-05-30 RX ADMIN — CALCITRIOL 0.25 MCG: 0.25 CAPSULE ORAL at 10:14

## 2019-05-30 RX ADMIN — GABAPENTIN 300 MG: 300 CAPSULE ORAL at 16:43

## 2019-05-30 RX ADMIN — HYDRALAZINE HYDROCHLORIDE 50 MG: 50 TABLET, FILM COATED ORAL at 10:14

## 2019-05-30 RX ADMIN — GABAPENTIN 300 MG: 300 CAPSULE ORAL at 10:14

## 2019-05-30 RX ADMIN — Medication 10 ML: at 10:14

## 2019-05-30 RX ADMIN — FUROSEMIDE 20 MG: 10 INJECTION, SOLUTION INTRAMUSCULAR; INTRAVENOUS at 03:35

## 2019-05-30 RX ADMIN — FERROUS SULFATE TAB 325 MG (65 MG ELEMENTAL FE) 325 MG: 325 (65 FE) TAB at 10:13

## 2019-05-30 RX ADMIN — HYDRALAZINE HYDROCHLORIDE 50 MG: 50 TABLET, FILM COATED ORAL at 20:39

## 2019-05-30 RX ADMIN — HYDRALAZINE HYDROCHLORIDE 50 MG: 50 TABLET, FILM COATED ORAL at 16:43

## 2019-05-30 ASSESSMENT — PAIN SCALES - GENERAL
PAINLEVEL_OUTOF10: 0

## 2019-05-30 NOTE — ED NOTES
Bed: 07  Expected date:   Expected time:   Means of arrival:   Comments:  Antonino Leonard RN  05/29/19 2395

## 2019-05-30 NOTE — PLAN OF CARE
Problem: Falls - Risk of:  Goal: Will remain free from falls  Description  Will remain free from falls  Outcome: Met This Shift     Problem: Risk for Impaired Skin Integrity  Goal: Tissue integrity - skin and mucous membranes  Description  Structural intactness and normal physiological function of skin and  mucous membranes.   Outcome: Met This Shift     Problem: Breathing Pattern - Ineffective:  Goal: Ability to achieve and maintain a regular respiratory rate will improve  Description  Ability to achieve and maintain a regular respiratory rate will improve  Outcome: Met This Shift

## 2019-05-30 NOTE — PROGRESS NOTES
Dr. Richar Wooten answering service notified of need for admission orders and informed that he will enter the orders himself.

## 2019-05-30 NOTE — CARE COORDINATION
Spoke with Pt about Discharge Plan. Pt does not want to return to [facility] or any SNF. Pt is active with [facility] with Therapy. Resume order is needed at Discharge. SW explained about [facility] and the home services they offer. Pt is wiling to have them come out to see if she would qualify for services. Sw made referral to [facility] for an assessment of need. Discharge Plan is home with [facility].

## 2019-05-30 NOTE — CONSULTS
Consults   See dictated consult for details:  Imp:  Coughing paroxysms     Plan:  Check Sputum Cx, Urine Legionella and Strep Ag, Respiratory panel film array  Tessalon  Hycodan  Albuterol PRN  Prior pneumonia now radiographically clear  Anticoagulated for PE  Will follow with you  Thanks!!!   Edil Swain D.O.  [phone]

## 2019-05-30 NOTE — ED PROVIDER NOTES
reports that she does not drink alcohol or use drugs. Family History: family history includes Cancer in her sister. Allergies: Patient has no known allergies. Physical Exam           ED Triage Vitals [05/29/19 2224]   BP Temp Temp src Pulse Resp SpO2 Height Weight   (!) 181/76 96.8 °F (36 °C) -- 80 20 94 % 5' 2\" (1.575 m) 265 lb (120.2 kg)      Oxygen Saturation Interpretation: Normal.    · General Appearance/Constitutional:  Alert  · HEENT:  NC/NT. PERRLA. Airway patent. · Neck:  Normal ROM. Supple. · Respiratoty:  Breath sounds: equal bilaterally. Lung sounds: wheezing- scattered. · CV:  Regular rate and rhythm, normal heart sounds, without pathological murmurs, ectopy, gallops, or rubs. .  · GI:  Soft, nontender, good bowel sounds. No firm or pulsatile mass. · Integument:  Normal turgor. Warm, dry, without visible rash. · Extremities/Lymphatics:  Edema:  3+ Bilateral lower extremity(s). No evidence of DVT seen on physical exam..  · Neurological:  Oriented x3. Motor functions intact.     Lab / Imaging Results   (All laboratory and radiology results have been personally reviewed by myself)  Labs:  Results for orders placed or performed during the hospital encounter of 05/29/19   Respiratory Panel, Film Array   Result Value Ref Range    Film Array Adenovirus       Result: Not Detected  *  *  Normal Range: Not Detected      Film Array Coronavirus HKU1       Result: Not Detected  *  *  Normal Range: Not Detected      Film Array Conoravirus NL63       Result: Not Detected  *  *  Normal Range: Not Detected      Film Array Coronavirus 229E       Result: Not Detected  *  *  Normal Range: Not Detected      Film Array Coronavirus OC43       Result: Not Detected  *  *  Normal Range: Not Detected      Film Array Influenza A Virus       Result: Not Detected  *  *  Normal Range: Not Detected      Film Array Influenza A Virus H1       Result: Not Detected  *  *  Normal Range: Not Detected      Film Array - 0.95 E9/L    Eosinophils # 0.55 (H) 0.05 - 0.50 E9/L    Basophils # 0.06 0.00 - 0.20 E9/L   Comprehensive Metabolic Panel   Result Value Ref Range    Sodium 146 132 - 146 mmol/L    Potassium 5.2 (H) 3.5 - 5.0 mmol/L    Chloride 114 (H) 98 - 107 mmol/L    CO2 23 22 - 29 mmol/L    Anion Gap 9 7 - 16 mmol/L    Glucose 163 (H) 74 - 99 mg/dL    BUN 51 (H) 8 - 23 mg/dL    CREATININE 3.3 (H) 0.5 - 1.0 mg/dL    GFR Non-African American 14 >=60 mL/min/1.73    GFR African American 17     Calcium 8.5 (L) 8.6 - 10.2 mg/dL    Total Protein 6.7 6.4 - 8.3 g/dL    Alb 3.6 3.5 - 5.2 g/dL    Total Bilirubin <0.2 0.0 - 1.2 mg/dL    Alkaline Phosphatase 78 35 - 104 U/L    ALT 12 0 - 32 U/L    AST 14 0 - 31 U/L   Troponin   Result Value Ref Range    Troponin 0.02 0.00 - 0.03 ng/mL   Brain Natriuretic Peptide   Result Value Ref Range    Pro-BNP 3,625 (H) 0 - 125 pg/mL   Magnesium   Result Value Ref Range    Magnesium 2.1 1.6 - 2.6 mg/dL   Lipase   Result Value Ref Range    Lipase 24 13 - 60 U/L   Urinalysis   Result Value Ref Range    Color, UA Yellow Straw/Yellow    Clarity, UA CLOUDY (A) Clear    Glucose, Ur Negative Negative mg/dL    Bilirubin Urine Negative Negative    Ketones, Urine Negative Negative mg/dL    Specific Gravity, UA 1.020 1.005 - 1.030    Blood, Urine SMALL (A) Negative    pH, UA 6.0 5.0 - 9.0    Protein, UA >=300 (A) Negative mg/dL    Urobilinogen, Urine 0.2 <2.0 E.U./dL    Nitrite, Urine Negative Negative    Leukocyte Esterase, Urine SMALL (A) Negative   Protime-INR   Result Value Ref Range    Protime 18.5 (H) 9.3 - 12.4 sec    INR 1.6    Microscopic Urinalysis   Result Value Ref Range    WBC, UA >20 0 - 5 /HPF    RBC, UA 5-10 (A) 0 - 2 /HPF    Epi Cells MODERATE /HPF    Bacteria, UA MANY (A) /HPF   Procalcitonin   Result Value Ref Range    Procalcitonin 0.29 (H) 0.00 - 0.08 ng/mL   Procalcitonin   Result Value Ref Range    Procalcitonin 0.32 (H) 0.00 - 0.08 ng/mL   POCT Glucose   Result Value Ref Range    Meter Glucose 143 (H) 74 - 99 mg/dL   POCT Glucose   Result Value Ref Range    Meter Glucose 137 (H) 74 - 99 mg/dL   EKG 12 Lead   Result Value Ref Range    Ventricular Rate 84 BPM    Atrial Rate 84 BPM    P-R Interval 200 ms    QRS Duration 130 ms    Q-T Interval 442 ms    QTc Calculation (Bazett) 522 ms    P Axis 71 degrees    R Axis -47 degrees    T Axis 95 degrees     Imaging: All Radiology results interpreted by Radiologist unless otherwise noted. CT ABDOMEN PELVIS WO CONTRAST Additional Contrast? None   Final Result   1. Minimally increased attenuation of the inferior ventral abdominal wall    superficial subcutaneous tissues, likely edema/cellulitis. 2. No acute intra-abdominal or intrapelvic abnormality. This report has been electronically signed by Priti Casillas MD.      XR CHEST PORTABLE   Final Result      Partial resolution right basilar infiltrate. Follow-up to complete   resolution to exclude malignancy. Ultimately, chest CT may be   beneficial.      Underlying interstitial lung disease. CT CHEST WO CONTRAST    (Results Pending)     EKG #1:  Interpreted by emergency department physician unless otherwise noted. Time:  23:34    Rate: 84  Rhythm: Sinus. Interpretation: LBBB.     ED Course / Medical Decision Making     Medications   ipratropium-albuterol (DUONEB) nebulizer solution 1 ampule (1 ampule Inhalation Not Given 5/29/19 2345)   acetaminophen (TYLENOL) tablet 650 mg (650 mg Oral Not Given 5/30/19 0311)   ALPRAZolam (XANAX) tablet 0.25 mg (has no administration in time range)   amLODIPine (NORVASC) tablet 10 mg (10 mg Oral Given 5/30/19 1014)   calcitRIOL (ROCALTROL) capsule 0.25 mcg (0.25 mcg Oral Given 5/30/19 1014)   ferrous sulfate tablet 325 mg (325 mg Oral Given 5/30/19 2039)   gabapentin (NEURONTIN) capsule 300 mg (300 mg Oral Given 5/30/19 2039)   glimepiride (AMARYL) tablet 2 mg (2 mg Oral Given 5/30/19 1013)   hydrALAZINE (APRESOLINE) tablet 50 mg (50 mg Oral Given 5/30/19 2039)   HYDROcodone-acetaminophen (NORCO) 5-325 MG per tablet 1 tablet (has no administration in time range)   warfarin (COUMADIN) tablet 4 mg (4 mg Oral Given 5/30/19 1837)   sodium chloride flush 0.9 % injection 10 mL (10 mLs Intravenous Given 5/30/19 2041)   sodium chloride flush 0.9 % injection 10 mL (has no administration in time range)   magnesium hydroxide (MILK OF MAGNESIA) 400 MG/5ML suspension 30 mL (has no administration in time range)   ondansetron (ZOFRAN) injection 4 mg (has no administration in time range)   acetaminophen (TYLENOL) tablet 650 mg (has no administration in time range)   glucose (GLUTOSE) 40 % oral gel 15 g (has no administration in time range)   dextrose 50 % solution 12.5 g (has no administration in time range)   glucagon (rDNA) injection 1 mg (has no administration in time range)   dextrose 5 % solution (has no administration in time range)   benzonatate (TESSALON) capsule 100 mg (100 mg Oral Given 5/30/19 0056)   furosemide (LASIX) injection 20 mg (20 mg Intravenous Given 5/30/19 0335)     Re-Evaluations:  5/30/19      Patients symptoms are improving. Consultations:             IP CONSULT TO SOCIAL WORK  IP CONSULT TO PULMONOLOGY    Procedures:   none    MDM:  Patient presents for diarrhea and a cough. She was recently discharged from the hospital for pneumonia. She is edematous with leg and abdominal wall edema. She was given Marinelli Lacrosse for cough and Lasix for her fluid overload. CT of the abdomen showed no evidence of infectious colitis. Chest x-ray consistent with fluid overload. Patient found to be hypoxic on ambulation. She was admitted for further management. Counseling:   I have spoken with the patient and discussed todays results, in addition to providing specific details for the plan of care and counseling regarding the diagnosis and prognosis and are agreeable with the plan. Assessment      1. Hypoxia    2.  Acute congestive heart failure, unspecified heart

## 2019-05-30 NOTE — PROGRESS NOTES
Occupational Therapy  OCCUPATIONAL THERAPY INITIAL EVALUATION      Date:2019  Patient Name: Santana Simon  MRN: [id number]  : 1950  Room: [address], OTR/L #[id number]    AM-PAC Daily Activity Raw Score:   Recommended Adaptive Equipment: TBD     Diagnosis: Hypoxia [R09.02]  Hypoxia [R09.02]  Hypoxia [R09.02]     Pt presented to ED on 19 for nagging cough, emesis, diarrhea  Pertinent Medical History: arthritis, chronic knee pain, depression, DMII, HTN, obesity, sarcoidosis, tremor    Precautions:  Falls, bed alarm, Purewick     Home Living: Pt lives alone in 2 floor home. 3 CINTHIA, grab bar on 1 side  Full bath on 2nd floor. Pt has been sleeping on 1st floor and sponge bathing in 1st floor half bath   Bathroom setup: tub/shower w/ tub bench (2nd fl)   Equipment owned: ww  Home OT/PT 2x/wk - pt recently d/c'd to home from [place] [id number]    Prior Level of Function: independent with ADLs , independent with IADLs; ambulated independently w/ ww  Driving: yes    Pain Level: Pt c/o no pain this session     Cognition: A&O: 4/4; Follows basic step directions  Verbal encouragement and education provided to participate.    Memory:  good    Sequencing:  fair    Problem solving:  fair    Judgement/safety:  fair      Functional Assessment:   Initial Eval Status  Date: 19 Treatment Status  Date: Short Term Goals  Treatment frequency: PRN    Feeding Independent   -   Grooming Minimal Assist   Modified [place]    UB Dressing Minimal Assist   Modified [place]    LB Dressing Dependent   Minimal Assist    Bathing Maximal Assist  Minimal Assist    Toileting Dependent   Purewick  Including hygiene  Minimal Assist    Bed Mobility  Supine to sit: Moderate Assist   Sit to supine: Minimal Assist   Supine to sit: Stand by Assist   Sit to supine: Stand by Assist    Functional Transfers Mod A  Sit><stand EOB  Education/cues for safety/hand placement  SBA   Functional Mobility NT  Pt declined mobility w/ verbal encouragement and education  Min A   Balance Sitting: SBA (static)  Min A (dynamic)  Standing: Mod A w/ ww - limited stand tolerance     Activity Tolerance Poor  Limited by self and c/o increased fatigue w/ minimal activity  Fair   Visual/  Perceptual Glasses: no  WFL                Hand dominance: R   Strength ROM Additional Info:    RUE  3+/5  WFL good  and wfl FMC/dexterity noted during ADL tasks       LUE 3+/5  WFL good  and wfl FMC/dexterity noted during ADL tasks       Hearing: WFL   Sensation:  c/o numbness/tingling B hands  Tone: WFL                             Comments/Treatment: Upon arrival, patient lying in bed. Therapist facilitated bed mobility (education/cues for body mechanics, sequencing. Rest breaks required. Increased time and effort for transition to EOB), functional transfers (education/cues for safety/hand placement and sequencing), standing tolerance tasks w/ ww (addressing posture, balance and activity tolerance while incorporating light functional reaching). Pt declined functional ambulation w/ verbal encouragement and education. Therapist facilitated self-care retraining: UB/LB self-care tasks (gown, socks), toileting task and seated grooming task while educating pt on modified techniques, posture, safety and energy conservation techniques. Skilled monitoring of HR, O2 sats and pts response to treatment (Pt on room air. O2 sat remained WNL at rest and with activity. Did reinforce rest and pursed lip breathing throughout d/t persistent cough). At end of session, patient lying in bed (bed alarm on) with call light and phone within reach, all lines and tubes intact. Pt would benefit from continued skilled OT to increase functional independence and quality of life.     mod  Profile and History- med (extensive chart review)  Assessment of Occupational Performance and Identification of Deficits- med  Clinical Decision Making- med    Evaluation time includes

## 2019-05-30 NOTE — PROGRESS NOTES
Received into 7410 via cart from ED> Assessment per flowsheet. Oriented to environment. Verbalizes understanding.

## 2019-05-30 NOTE — PROGRESS NOTES
[facility]  Physical Therapy Initial Evaluation    Name: Johnny Kimball  : 1950  MRN: [id number]    Date of Service: 2019        Evaluating Therapist: Genie Pacheco PT, DPT       Equipment Recommendations: none. Pt has FWW. Room #: 5506/3799-Q  DIAGNOSIS: hypoxia  PRECAUTIONS: fall risk    Social:  Pt lives alone in a 2 floor plan 3 steps and 1 rail on left side going up to enter. Prior to admission pt walked with FWW in community and independent in home. Pt states that her son lives 2 doors down the street from her and is \"very available\" for when she needs help even though he does work. Initial Evaluation  Date: 19 Treatment      Short Term/ Long Term   Goals   Was pt agreeable to Eval/treatment? yes     Does pt have pain? Only when standing in B knees 8/10     Bed Mobility  Rolling: standby  Supine to sit: standby  Sit to supine: standby  Scooting: standby  supervision   Transfers Sit to stand: min A x1  Stand to sit: min A x1  Stand pivot: NT  standby   Ambulation    NT  50 feet with FWW with min A   Stair negotiation: ascended and descended NT  3 steps with 1 rail with min A   AM-PAC Raw Score 13/24       Pt is alert and Oriented x3  ROM:  L LE WFL except limited knee extension  R LE WFL except limited knee extension  Strength:   L LE 3+  R LE 3+  Balance: standing min A  Sensation: intact except right hand tingles when pressure is applied during walker use and when scooting in bed per pt saying it feels like it's asleep. Edema: none noted  Endurance: poor  bed alarm: was on at start of evaluation, nursing stated to leave it off at end of evaluation     ASSESSMENT  Pt displays functional ability as noted in the objective portion of this evaluation. Pt found supine in bed. Pt reported \"choking\" cough and some dizziness when standing. Pt sat EOB for AROM and MMT. Pt stood bedside with noted flexion in B knees.  When asked to try and straighten them, pt stated that she couldn't and that she needed to \"get both of them done\". Pt reported 8/10 pain in B knees and asked to sit back down. Pt sat down with mildly unsafe velocity, but demonstrated safe hand placement during transfers using FWW. Pain in B knees resolved to 0/10 per pt upon sitting. Noted tremor in B hands after activity. When asked about it pt said she \"hasn't eaten anything in 3-4 days\" and felt weak. Noted increased respiration at end of activity that resolved as she rested. Pt left supine in bed with HOB elevated, call light in reach and bed alarm left off per nursing. Patient education  Reviewed hand placement with FWW during transfers. Pt was able to recall without instruction. Rehab potential is Good for reaching above PT goals. Pts/ family goals   1. None stated    Patient and or family understand(s) diagnosis, prognosis, and plan of care. PLAN  PT care will be provided in accordance with the objectives noted above. Whenever appropriate, clear delegation orders will be provided for nursing staff. Exercises and functional mobility practice will be used as well as appropriate assistive devices or modalities to obtain goals. Patient and family education will also be administered as needed. Frequency of treatments will be 2-5x/week x 3-5 days.     Time in: 1401  Time out: 1427  Evaluation + 15  minutes tx time    Felisa Kaur SPT  Mallory Stratton PT, DPT  License number:  [id number]

## 2019-05-31 ENCOUNTER — APPOINTMENT (OUTPATIENT)
Dept: CT IMAGING | Age: 69
DRG: 204 | End: 2019-05-31
Payer: MEDICARE

## 2019-05-31 LAB
ALBUMIN SERPL-MCNC: 3.5 G/DL (ref 3.5–5.2)
ALP BLD-CCNC: 78 U/L (ref 35–104)
ALT SERPL-CCNC: 10 U/L (ref 0–32)
ANION GAP SERPL CALCULATED.3IONS-SCNC: 12 MMOL/L (ref 7–16)
AST SERPL-CCNC: 12 U/L (ref 0–31)
BASOPHILS ABSOLUTE: 0.04 E9/L (ref 0–0.2)
BASOPHILS RELATIVE PERCENT: 0.4 % (ref 0–2)
BILIRUB SERPL-MCNC: <0.2 MG/DL (ref 0–1.2)
BUN BLDV-MCNC: 43 MG/DL (ref 8–23)
CALCIUM SERPL-MCNC: 8.6 MG/DL (ref 8.6–10.2)
CHLORIDE BLD-SCNC: 112 MMOL/L (ref 98–107)
CO2: 21 MMOL/L (ref 22–29)
CREAT SERPL-MCNC: 3.4 MG/DL (ref 0.5–1)
EOSINOPHILS ABSOLUTE: 0.4 E9/L (ref 0.05–0.5)
EOSINOPHILS RELATIVE PERCENT: 3.6 % (ref 0–6)
GFR AFRICAN AMERICAN: 16
GFR NON-AFRICAN AMERICAN: 13 ML/MIN/1.73
GLUCOSE BLD-MCNC: 285 MG/DL (ref 74–99)
HCT VFR BLD CALC: 33.1 % (ref 34–48)
HEMOGLOBIN: 10 G/DL (ref 11.5–15.5)
IMMATURE GRANULOCYTES #: 0.12 E9/L
IMMATURE GRANULOCYTES %: 1.1 % (ref 0–5)
INR BLD: 1.5
L. PNEUMOPHILA SEROGP 1 UR AG: NORMAL
LYMPHOCYTES ABSOLUTE: 1.18 E9/L (ref 1.5–4)
LYMPHOCYTES RELATIVE PERCENT: 10.6 % (ref 20–42)
MCH RBC QN AUTO: 29.9 PG (ref 26–35)
MCHC RBC AUTO-ENTMCNC: 30.2 % (ref 32–34.5)
MCV RBC AUTO: 99.1 FL (ref 80–99.9)
METER GLUCOSE: 164 MG/DL (ref 74–99)
METER GLUCOSE: 76 MG/DL (ref 74–99)
MONOCYTES ABSOLUTE: 1.06 E9/L (ref 0.1–0.95)
MONOCYTES RELATIVE PERCENT: 9.5 % (ref 2–12)
NEUTROPHILS ABSOLUTE: 8.32 E9/L (ref 1.8–7.3)
NEUTROPHILS RELATIVE PERCENT: 74.8 % (ref 43–80)
PDW BLD-RTO: 13.2 FL (ref 11.5–15)
PLATELET # BLD: 261 E9/L (ref 130–450)
PMV BLD AUTO: 10.3 FL (ref 7–12)
POTASSIUM REFLEX MAGNESIUM: 4.9 MMOL/L (ref 3.5–5)
PROTHROMBIN TIME: 16.8 SEC (ref 9.3–12.4)
RBC # BLD: 3.34 E12/L (ref 3.5–5.5)
SODIUM BLD-SCNC: 145 MMOL/L (ref 132–146)
STREP PNEUMONIAE ANTIGEN, URINE: NORMAL
TOTAL PROTEIN: 6.9 G/DL (ref 6.4–8.3)
WBC # BLD: 11.1 E9/L (ref 4.5–11.5)

## 2019-05-31 PROCEDURE — 97110 THERAPEUTIC EXERCISES: CPT

## 2019-05-31 PROCEDURE — 87205 SMEAR GRAM STAIN: CPT

## 2019-05-31 PROCEDURE — 2060000000 HC ICU INTERMEDIATE R&B

## 2019-05-31 PROCEDURE — 97535 SELF CARE MNGMENT TRAINING: CPT

## 2019-05-31 PROCEDURE — 85610 PROTHROMBIN TIME: CPT

## 2019-05-31 PROCEDURE — 71250 CT THORAX DX C-: CPT

## 2019-05-31 PROCEDURE — 6370000000 HC RX 637 (ALT 250 FOR IP): Performed by: FAMILY MEDICINE

## 2019-05-31 PROCEDURE — 36415 COLL VENOUS BLD VENIPUNCTURE: CPT

## 2019-05-31 PROCEDURE — 85025 COMPLETE CBC W/AUTO DIFF WBC: CPT

## 2019-05-31 PROCEDURE — 87070 CULTURE OTHR SPECIMN AEROBIC: CPT

## 2019-05-31 PROCEDURE — 97530 THERAPEUTIC ACTIVITIES: CPT

## 2019-05-31 PROCEDURE — 2580000003 HC RX 258: Performed by: FAMILY MEDICINE

## 2019-05-31 PROCEDURE — 87206 SMEAR FLUORESCENT/ACID STAI: CPT

## 2019-05-31 PROCEDURE — 2700000000 HC OXYGEN THERAPY PER DAY

## 2019-05-31 PROCEDURE — 80053 COMPREHEN METABOLIC PANEL: CPT

## 2019-05-31 PROCEDURE — 82962 GLUCOSE BLOOD TEST: CPT

## 2019-05-31 RX ORDER — GUAIFENESIN 400 MG/1
400 TABLET ORAL 3 TIMES DAILY
Status: DISCONTINUED | OUTPATIENT
Start: 2019-05-31 | End: 2019-06-02 | Stop reason: HOSPADM

## 2019-05-31 RX ORDER — CHOLECALCIFEROL (VITAMIN D3) 125 MCG
5 CAPSULE ORAL NIGHTLY PRN
Status: DISCONTINUED | OUTPATIENT
Start: 2019-05-31 | End: 2019-06-02 | Stop reason: HOSPADM

## 2019-05-31 RX ORDER — GUAIFENESIN 600 MG/1
600 TABLET, EXTENDED RELEASE ORAL 2 TIMES DAILY
Status: DISCONTINUED | OUTPATIENT
Start: 2019-05-31 | End: 2019-05-31 | Stop reason: SDUPTHER

## 2019-05-31 RX ORDER — WARFARIN SODIUM 5 MG/1
5 TABLET ORAL DAILY
Status: DISCONTINUED | OUTPATIENT
Start: 2019-06-01 | End: 2019-06-02 | Stop reason: HOSPADM

## 2019-05-31 RX ORDER — LEVALBUTEROL INHALATION SOLUTION 0.63 MG/3ML
0.63 SOLUTION RESPIRATORY (INHALATION) EVERY 6 HOURS PRN
Status: DISCONTINUED | OUTPATIENT
Start: 2019-05-31 | End: 2019-06-02 | Stop reason: HOSPADM

## 2019-05-31 RX ADMIN — Medication 10 ML: at 20:13

## 2019-05-31 RX ADMIN — HYDRALAZINE HYDROCHLORIDE 50 MG: 50 TABLET, FILM COATED ORAL at 13:39

## 2019-05-31 RX ADMIN — GUAIFENESIN 400 MG: 400 TABLET ORAL at 13:39

## 2019-05-31 RX ADMIN — GUAIFENESIN 400 MG: 400 TABLET ORAL at 10:57

## 2019-05-31 RX ADMIN — FERROUS SULFATE TAB 325 MG (65 MG ELEMENTAL FE) 325 MG: 325 (65 FE) TAB at 08:13

## 2019-05-31 RX ADMIN — CALCITRIOL 0.25 MCG: 0.25 CAPSULE ORAL at 08:12

## 2019-05-31 RX ADMIN — HYDRALAZINE HYDROCHLORIDE 50 MG: 50 TABLET, FILM COATED ORAL at 08:13

## 2019-05-31 RX ADMIN — GABAPENTIN 300 MG: 300 CAPSULE ORAL at 08:14

## 2019-05-31 RX ADMIN — GABAPENTIN 300 MG: 300 CAPSULE ORAL at 20:12

## 2019-05-31 RX ADMIN — GUAIFENESIN 400 MG: 400 TABLET ORAL at 20:12

## 2019-05-31 RX ADMIN — HYDRALAZINE HYDROCHLORIDE 50 MG: 50 TABLET, FILM COATED ORAL at 20:12

## 2019-05-31 RX ADMIN — WARFARIN SODIUM 4 MG: 4 TABLET ORAL at 17:35

## 2019-05-31 RX ADMIN — GLIMEPIRIDE 2 MG: 2 TABLET ORAL at 07:19

## 2019-05-31 RX ADMIN — AMLODIPINE BESYLATE 10 MG: 10 TABLET ORAL at 08:12

## 2019-05-31 RX ADMIN — Medication 10 ML: at 08:14

## 2019-05-31 RX ADMIN — FERROUS SULFATE TAB 325 MG (65 MG ELEMENTAL FE) 325 MG: 325 (65 FE) TAB at 20:12

## 2019-05-31 RX ADMIN — GABAPENTIN 300 MG: 300 CAPSULE ORAL at 13:39

## 2019-05-31 ASSESSMENT — PAIN SCALES - GENERAL
PAINLEVEL_OUTOF10: 0

## 2019-05-31 NOTE — CONSULTS
[address]                  [address] [place],  [facility]                                  CONSULTATION    PATIENT NAME: Eli Martinez                      :        1950  MED REC NO:   [id number]                            ROOM:       7410  ACCOUNT NO:   [de-identified]                           ADMIT DATE: 2019  PROVIDER:     Jeff Easton DO      CONSULT DATE:  2019    PULMONARY AND CRITICAL CARE CONSULTATION    INDICATION FOR CONSULTATION:  The patient with new onset cough with  coughing paroxysms. HISTORY OF PRESENT ILLNESS:  This 68-year-old woman is well known to me  from a recent admission for a lower lobe pneumonia. This is  radiographically cleared. She reports that over the course of the last  week or so, she began developing cough which reports feels like a tickle  in the back of her throat. She then begins coughing at times with  episodes of coughing paroxysms. She reports sputum to be clear. She  believes she may have been febrile yesterday, but did not actually take  her temperature. She denies shaking chills. She does report episodes  of coughing so severe that she does have a small amount of emesis. She  denies any sick contact. She denies any recent travel. She denies any  wheezing. ALLERGIES:  No known drug allergies. MEDICATIONS:  Includes Amaryl, Norco, Xanax, Coumadin, Apresoline, iron  tabs, Norvasc, Glucotrol and Neurontin. SOCIAL HISTORY:  The patient is a former 30 pack-year smoker, having  quit more than two decades ago. She had worked as a hairdresser. She  denies environmental exposures including asbestos. She denies any  exposure to hot tubs or sauna. She does report travel to the [facility],  at about the same time she developed her pneumonia. PAST MEDICAL HISTORY:  Significant for hypertension, hyperlipidemia,  diabetes, pulmonary embolism, chronic kidney disease.   She also reports  a history of sarcoidosis, diagnosed by an inguinal lymph node biopsy,  radiographically she is stage I.  Pulmonary embolism was discovered when  a V/Q scan was performed. This demonstrated a defect in the left lower  lobe. On chest x-ray, this area was clear. Right lower lobe pneumonia  has completely radiographically cleared. FAMILY HISTORY:  Mother and father both lived until their 80s,  of  \"old age. \"  She does report two children with history of asthma. REVIEW OF SYSTEMS:  Following pulmonary, GI:  Denies peptic ulcer  disease, diverticular disease or reflux. :  History of renal  insufficiency. She denies nephrolithiasis. ENDOCRINE:  History of  diabetes mellitus. Denies thyroid disease. PHYSICAL EXAMINATION:  VITAL SIGNS:  Pulse is 84, respirations 20, blood pressure 168/80,  temperature is 99.3. GENERAL:  Obese female, currently in no acute distress. NEURO:  Awake, alert and oriented x3. Sensory and motor function  symmetrically intact. CARDIAC:  Heart is regular without S3, S4, or murmur. PULMONARY:  Auscultation of lungs finds lung fields to be clear. Deep  inhalation; however, does promote coughing. There is no wheeze or  rhonchi appreciated. ABDOMEN:  The abdomen is soft. Positive bowel sounds. No guarding or  rebound is appreciated. There is no palpable organomegaly noted on  exam.  EXTREMITIES:  Distal pulses are intact and symmetric. There is no  digital clubbing noted on exam.  LYMPHATICS:  There is no cervical, clavicular or axillary adenopathy  noted on exam.  ENT:  The patient is Mallampati class III. LABORATORY FINDINGS:  Chest x-ray demonstrates mild hilar adenopathy. A  CT scan of the abdomen was performed. Lower cuts of lung films find the  lower lungs to be clear. Chemistries:  Sodium 146, potassium 5.2,  chloride 114, bicarbonate 23, BUN 51, creatinine 3.3, glucose is 163. Transaminases are unremarkable.   CBC:  White count 10.9, hemoglobin 9.3,  hematocrit 30.6 and platelets 491,066. INR is 1.6. IMPRESSION:  1. Cough with coughing paroxysms. 2.  Abnormal V/Q, currently being anticoagulated for possible pulmonary  embolism. PLAN:  1. Obtain sputum gram stain culture and _____ strep antigen as well as  respiratory panel FilmArray. 2.  Tessalon for bronchitic cough. 3.  Hycodan for breakthrough. The patient to use at bedtime to assist  in sleep. 4.  Albuterol on a p.r.n. basis. 5.  Continue oral anticoagulation with Coumadin for history of pulmonary  embolism. We will follow along with you in the care of the patient. Once again,  we thank you for the opportunity to be involved in the care of your  patient. If I can provide you with any further information, please feel  free to contact me directly.         Julia Burton DO    D: 05/30/2019 15:18:03       T: 05/30/2019 18:13:01     [email or web address]  Job#: [id number]     Doc#: [id number]    CC:

## 2019-05-31 NOTE — PROGRESS NOTES
OT BEDSIDE TREATMENT NOTE      Date:2019  Patient Name: Ivan Murray  MRN: [id number]  : 1950  Room: [address] [place] [address]A     Per OT Eval:    Evaluating [address], OTR/L #[id number]     AM-PAC Daily Activity Raw Score:   Recommended Adaptive Equipment: TBD      Diagnosis: Hypoxia [R09.02]  Hypoxia [R09.02]  Hypoxia [R09.02]     Pt presented to ED on 19 for nagging cough, emesis, diarrhea  Pertinent Medical History: arthritis, chronic knee pain, depression, DMII, HTN, obesity, sarcoidosis, tremor     Precautions:  Falls, isolation,      Home Living: Pt lives alone in 2 floor home. 3 CINTHIA, grab bar on 1 side  Full bath on 2nd floor. Pt has been sleeping on 1st floor and sponge bathing in 1st floor half bath   Bathroom setup: tub/shower w/ tub bench (2nd fl)   Equipment owned: ww  Home OT/PT 2x/wk - pt recently d/c'd to home from [address] [place] [address]  Prior Level of Function: independent with ADLs , independent with IADLs; ambulated independently w/ ww  Driving: yes     Pain Level: No pain at rest in bed, once up & moving reports mild pain 3/10 B knees which is chronic      Cognition: A&O: 4/4; pleasant & cooperative, motivated & eager to return home              Memory:  good               Sequencing:  fair               Problem solving:  fair               Judgement/safety:  fair+                Functional Assessment:    Initial Eval Status  Date: 19 Treatment Status  Date:  19 Short Term Goals  Treatment frequency: PRN    Feeding Independent    -   Grooming Minimal Assist   Set up  Seated  SBA   Only able to tolerate standing short time at the sink due to chronic knee pain  Modified [place]    UB Dressing Minimal Assist  Set up  Seated at EOB  Modified [place]    LB Dressing Dependent  SBA  Antoine socks while tall sitting in bed, pt reports she wears mostly slip on shoes, no socks at home,rachel during summer.  Pt declined to antoine brief  Minimal Assist    Bathing Maximal Assist  Min A  Simulated task, pt has been sponge bathing at home but does have shower chair  Recommending pt continue with [place] OT regarding 2nd floor set up for bathing tasks with Good understanding Minimal Assist    Toileting Dependent   Purewick  Including hygiene SBA  Commode transfer & hygiene (simulated declined need to urinate)  Encouraged pt to stop using external catheter, instead get out of bed & use bathroom with the staff to simulate home environment to increase overall strength & tolerance with Good understanding  Minimal Assist    Bed Mobility  Supine to sit: Moderate Assist   Sit to supine: Minimal Assist  SBA  With HOB upright increased time to complete & use of bed rail, pt does report she has been sleeping in a chair at home   Supine to sit: Stand by Assist   Sit to supine: Stand by Assist    Functional Transfers Mod A  Sit><stand EOB  Education/cues for safety/hand placement SBA  Good safety noted SBA   Functional Mobility NT  Pt declined mobility w/ verbal encouragement and education SBA  Using walker, slow but steady pace, flexed at B knees & slight at her trunk cues for posture within her ability,  Short household distance Min A   Balance Sitting: SBA (static)  Min A (dynamic)  Standing: Mod A w/ ww - limited stand tolerance Sitting: Supervision  Standing: SBA  Using walker, pt reports she does not use her walker in the home currently, recommending pt use the walker at home until [place] OT/PT recommends otherwise with Good understanding      Activity Tolerance Poor  Limited by self and c/o increased fatigue w/ minimal activity Fair   Monitored O2 through out on 3L 98%, nsg requested to remove & continue to assess.  Even with exertion 92-93%, dropping once to 90% quickly recovering  Fair   Visual/  Perceptual Glasses: no  WFL            Education:  Pt was educated through out treatment regarding proper technique & safety with bed mobility, functional transfers & mobility, ADL compensatory strategies & importance of energy conservation

## 2019-05-31 NOTE — PROGRESS NOTES
[facility]  Physical Therapy Treatment Note    Name: Adwoa Méndez  : 1950  MRN: [id number]    Date of Service: 2019        Evaluating Therapist: Laina Ferraro PT, DPT       Equipment Recommendations: none. Pt has FWW. Room #: 5385/6467-Q  DIAGNOSIS: hypoxia  PRECAUTIONS: fall risk    Social:  Pt lives alone in a 2 floor plan 3 steps and 1 rail on left side going up to enter. Prior to admission pt walked with FWW in community and independent in home. Pt states that her son lives 2 doors down the street from her and is \"very available\" for when she needs help even though he does work. Initial Evaluation  Date: 19 Treatment  19      Short Term/ Long Term   Goals   Was pt agreeable to Eval/treatment? yes yes    Does pt have pain? Only when standing in B knees 8/10 None at rest  3/10 B knees with mobility    Bed Mobility  Rolling: standby  Supine to sit: standby  Sit to supine: standby  Scooting: standby Rolling: SBA  Supine to sit: SBA  Sit to supine: NT  Scooting: SBA supervision   Transfers Sit to stand: min A x1  Stand to sit: min A x1  Stand pivot: NT Sit to stand: SBA  Stand to sit: SBA  Stand pivot: SBA with ww standby   Ambulation    NT 20 feet x2 reps with ww with SBA 50 feet with FWW with min A   Stair negotiation: ascended and descended NT NT 3 steps with 1 rail with min A   AM-PAC Raw Score       Patient educated on: breathing technique, safety with mobility, posture     Patient response to education:   Pt verbalized understanding Pt demonstrated skill Pt requires further education in this area    yes  with cues  reinforce       Comments/Treatment: Pt supine in bed upon entering room. Pt taken off O2 per RN. Pt's spO2 95% at rest on RA. Pt transferred to EOB with HOB elevated requiring increased time and cues for technique. Pt performed 10 reps ankle pumps and LAQ to BLE while seated EOB.  Pt required cues for upright posture when standing with pt partially

## 2019-05-31 NOTE — H&P
[address]                  [address] [facility]  [facility]                              HISTORY AND PHYSICAL    PATIENT NAME: Wilder Beck                      :        1950  MED REC NO:   [id number]                            ROOM:       7410  ACCOUNT NO:   [de-identified]                           ADMIT DATE: 2019  PROVIDER:     Sangeeta Yee MD    CHIEF COMPLAINT:  Shortness of breath, cough, diarrhea, and hypoxia. HISTORY OF PRESENT ILLNESS:  A 21-year-old recently admitted for  pneumonia and PE. The patient briefly went to [facility] for inpatient  therapy, then was discharged home. The patient states she was feeling  relatively well up until a day or two before admission when she started  developing a dry nonproductive cough, shortness of breath, also on the  date admission, developed diarrhea, came into the emergency room, was  found to have a low oxygen saturation. Chest x-ray showed partial  clearing of her previous pneumonia and the patient was admitted for  hypoxia, cough, and diarrhea. At the time of this dictation, her  respiratory viral panel is positive for rhinovirus/enterovirus. MEDICATIONS:  Recent present medications, see med rec in the chart. PAST MEDICAL HISTORY:  Positive for diabetes, severe osteoarthritis of  both knees, chronic kidney disease, previous history of sarcoidosis,  recent admission for pneumonia and pulmonary emboli, also previous  history of GI bleed, and displaced fracture of the medial condyle of the  left femur. PAST SURGICAL HISTORY:  Included cholecystectomy, upper endoscopy,  cystoscopy, foot surgery, and  sections x3. SOCIAL HISTORY:  Quit smoking in . Denies alcohol use. FAMILY MEDICAL HISTORY:  Noncontributory. ALLERGIES:  None known. REVIEW OF SYSTEMS:  SKIN AND LYMPHATICS:  Negative.   CENTRAL NERVOUS SYSTEM:  She denies headache, dizziness, or

## 2019-05-31 NOTE — PROGRESS NOTES
Pulse ox on room air sitting 94%  Pulse ox on room air ambulating 92%  Pulse ox on 3L sitting recovery 98%  Pule ox on 3L ambulating recovery 93%

## 2019-05-31 NOTE — PROGRESS NOTES
Aiden Menchaca M.D.,[facility]  Kartik Prince D.O., [email or web address]., Lorie Garner M.D. Papito Carreon M.D., Shahriar Elaine M.D. Daily Pulmonary Progress Note    Patient:  Cathy Obrien 70 y.o. female MRN: [id number]     Date of Service: 5/31/2019      Synopsis     We are following patient for positive rhinovirus , on anticoagulation for abnormal VQ scan possible pulmonary embolism    \"CC\" cough    Code status: Full      Subjective      Patient was seen and examined. She is sitting up in chair, she is able to walk to the bathroom now on 2 L nasal cannula 95% saturation. Reports the cough continues it is primarily clear. Shortness of breath has improved. She reports no fever chills or night sweats. Review of Systems:  Constitutional: Denies fever, weight loss, night sweats, and fatigue  Skin: Denies pigmentation, dark lesions, and rashes   HEENT: Denies hearing loss, tinnitus, ear drainage, epistaxis, sore throat, and hoarseness. Cardiovascular: Denies palpitations, chest pain, and chest pressure. Respiratory: + cough, dyspnea at rest, hemoptysis, apnea, and choking. Gastrointestinal: Denies nausea, vomiting, poor appetite, diarrhea, heartburn or reflux  Genitourinary: Denies dysuria, frequency, urgency or hematuria  Musculoskeletal: Denies myalgias, muscle weakness, and bone pain      24-hour events:  Cough    Objective   Vitals: /61   Pulse 82   Temp 98.3 °F (36.8 °C) (Temporal)   Resp 18   Ht 5' 2\" (1.575 m)   Wt 276 lb (125.2 kg)   SpO2 95%   Breastfeeding?  No   BMI 50.48 kg/m²     I/O:    Intake/Output Summary (Last 24 hours) at 5/31/2019 1445  Last data filed at 5/31/2019 0942  Gross per 24 hour   Intake 540 ml   Output 950 ml   Net -410 ml                        CURRENT MEDS :  Scheduled Meds:   guaiFENesin  400 mg Oral TID    acetaminophen  650 mg Oral Once    amLODIPine  10 mg Oral Daily    calcitRIOL  0.25 mcg Oral Daily    ferrous sulfate  325 mg Oral BID    gabapentin  300 mg Oral TID    glimepiride  2 mg Oral QAM AC    hydrALAZINE  50 mg Oral TID    warfarin  4 mg Oral Daily    sodium chloride flush  10 mL Intravenous 2 times per day       Physical Exam:  General Appearance: appears comfortable in no acute distress. HEENT: Normocephalic atraumatic without obvious abnormality   Neck: Lips, mucosa, and tongue normal.  Supple, symmetrical, trachea midline, no adenopathy;thyroid:  no enlargement/tenderness/nodules or JVD. Lung: Breath sounds CTA course breath sounds bilaterally, no rhonchi, no wheezing. Respirations   unlabored. Symmetrical expansion. Heart: RRR, normal S1, S2. No MRG  Abdomen: Soft, NT, ND. BS present x 4 quadrants. No bruit or organomegaly. Extremities: Pedal pulses 2+ symmetric b/l. Extremities normal, no cyanosis, clubbing, or edema. Musculokeletal: No joint swelling, no muscle tenderness. ROM normal in all joints of extremities. Neurologic: Mental status: Alert and Oriented X3 . Pertinent/ New Labs and Imaging Studies     Labs:  Lab Results   Component Value Date    WBC 11.1 05/31/2019    HGB 10.0 05/31/2019    HCT 33.1 05/31/2019    MCV 99.1 05/31/2019    MCH 29.9 05/31/2019    MCHC 30.2 05/31/2019    RDW 13.2 05/31/2019     05/31/2019    MPV 10.3 05/31/2019     Lab Results   Component Value Date     05/31/2019    K 4.9 05/31/2019     05/31/2019    CO2 21 05/31/2019    BUN 43 05/31/2019    CREATININE 3.4 05/31/2019    LABALBU 3.5 05/31/2019    LABALBU 4.1 10/12/2011    CALCIUM 8.6 05/31/2019    GFRAA 16 05/31/2019    LABGLOM 13 05/31/2019     Lab Results   Component Value Date    PROTIME 16.8 05/31/2019    PROTIME 15.3 06/25/2011    INR 1.5 05/31/2019     Recent Labs     05/29/19  2340   PROBNP 3,625*     Recent Labs     05/30/19  0918   PROCAL 0.32*     This SmartLink has not been configured with any valid records. Micro:  No results for input(s): CULTRESP in the last 72 hours.   No results for

## 2019-06-01 LAB
GRAM STAIN ORDERABLE: NORMAL
INR BLD: 1.6
METER GLUCOSE: 181 MG/DL (ref 74–99)
METER GLUCOSE: 79 MG/DL (ref 74–99)
PROTHROMBIN TIME: 17.7 SEC (ref 9.3–12.4)

## 2019-06-01 PROCEDURE — 94150 VITAL CAPACITY TEST: CPT

## 2019-06-01 PROCEDURE — 2060000000 HC ICU INTERMEDIATE R&B

## 2019-06-01 PROCEDURE — 85610 PROTHROMBIN TIME: CPT

## 2019-06-01 PROCEDURE — 36415 COLL VENOUS BLD VENIPUNCTURE: CPT

## 2019-06-01 PROCEDURE — 6370000000 HC RX 637 (ALT 250 FOR IP): Performed by: FAMILY MEDICINE

## 2019-06-01 PROCEDURE — 2580000003 HC RX 258: Performed by: FAMILY MEDICINE

## 2019-06-01 PROCEDURE — 82962 GLUCOSE BLOOD TEST: CPT

## 2019-06-01 RX ADMIN — GLIMEPIRIDE 2 MG: 2 TABLET ORAL at 07:02

## 2019-06-01 RX ADMIN — GUAIFENESIN 400 MG: 400 TABLET ORAL at 22:25

## 2019-06-01 RX ADMIN — Medication 10 ML: at 22:22

## 2019-06-01 RX ADMIN — AMLODIPINE BESYLATE 10 MG: 10 TABLET ORAL at 09:07

## 2019-06-01 RX ADMIN — HYDRALAZINE HYDROCHLORIDE 50 MG: 50 TABLET, FILM COATED ORAL at 09:07

## 2019-06-01 RX ADMIN — GUAIFENESIN 400 MG: 400 TABLET ORAL at 13:41

## 2019-06-01 RX ADMIN — Medication 5 MG: at 22:22

## 2019-06-01 RX ADMIN — HYDRALAZINE HYDROCHLORIDE 50 MG: 50 TABLET, FILM COATED ORAL at 22:22

## 2019-06-01 RX ADMIN — FERROUS SULFATE TAB 325 MG (65 MG ELEMENTAL FE) 325 MG: 325 (65 FE) TAB at 09:08

## 2019-06-01 RX ADMIN — GUAIFENESIN 400 MG: 400 TABLET ORAL at 09:08

## 2019-06-01 RX ADMIN — Medication 10 ML: at 09:07

## 2019-06-01 RX ADMIN — HYDRALAZINE HYDROCHLORIDE 50 MG: 50 TABLET, FILM COATED ORAL at 13:41

## 2019-06-01 RX ADMIN — GABAPENTIN 300 MG: 300 CAPSULE ORAL at 09:07

## 2019-06-01 RX ADMIN — WARFARIN SODIUM 5 MG: 5 TABLET ORAL at 18:30

## 2019-06-01 RX ADMIN — FERROUS SULFATE TAB 325 MG (65 MG ELEMENTAL FE) 325 MG: 325 (65 FE) TAB at 22:22

## 2019-06-01 RX ADMIN — GABAPENTIN 300 MG: 300 CAPSULE ORAL at 13:41

## 2019-06-01 RX ADMIN — CALCITRIOL 0.25 MCG: 0.25 CAPSULE ORAL at 09:08

## 2019-06-01 RX ADMIN — GABAPENTIN 300 MG: 300 CAPSULE ORAL at 22:22

## 2019-06-01 ASSESSMENT — PAIN SCALES - GENERAL
PAINLEVEL_OUTOF10: 0

## 2019-06-01 NOTE — PROGRESS NOTES
of medial condyle of left femur, initial encounter for closed fracture ([address]    Anemia due to stage 3 chronic kidney disease [facility]    Pneumonia, bacterial    Acute pulmonary embolism ([facility]    Hypoxia       Plan:  Switch to Piyush Casillas  11:10 PM  5/31/2019

## 2019-06-01 NOTE — PROGRESS NOTES
Displaced fracture of medial condyle of left femur, initial encounter for closed fracture ([address]    Anemia due to stage 3 chronic kidney disease ([address]    Pneumonia, bacterial    Acute pulmonary embolism ([address]    Hypoxia       Plan:  D/C in [place]  11:15 AM  6/1/2019

## 2019-06-01 NOTE — PROGRESS NOTES
Dr. Ashley Ramirez paged via perfect serve to see about a melatonin order for sleep.  Jethro Goldmann

## 2019-06-01 NOTE — PLAN OF CARE
Problem: Falls - Risk of:  Goal: Will remain free from falls  Description  Will remain free from falls  Outcome: Met This Shift  Goal: Absence of physical injury  Description  Absence of physical injury  Outcome: Met This Shift     Problem: Risk for Impaired Skin Integrity  Goal: Tissue integrity - skin and mucous membranes  Description  Structural intactness and normal physiological function of skin and  mucous membranes.   Outcome: Met This Shift     Problem: Breathing Pattern - Ineffective:  Goal: Ability to achieve and maintain a regular respiratory rate will improve  Description  Ability to achieve and maintain a regular respiratory rate will improve  Outcome: Met This Shift

## 2019-06-02 VITALS
HEIGHT: 62 IN | WEIGHT: 276 LBS | OXYGEN SATURATION: 96 % | DIASTOLIC BLOOD PRESSURE: 70 MMHG | TEMPERATURE: 98 F | BODY MASS INDEX: 50.79 KG/M2 | RESPIRATION RATE: 18 BRPM | HEART RATE: 75 BPM | SYSTOLIC BLOOD PRESSURE: 138 MMHG

## 2019-06-02 PROBLEM — R09.02 HYPOXIA: Status: RESOLVED | Noted: 2019-05-30 | Resolved: 2019-06-02

## 2019-06-02 PROBLEM — A09 DIARRHEA OF INFECTIOUS ORIGIN: Status: ACTIVE | Noted: 2019-06-02

## 2019-06-02 PROBLEM — J20.6 ACUTE BRONCHITIS DUE TO RHINOVIRUS: Status: ACTIVE | Noted: 2019-06-02

## 2019-06-02 PROBLEM — R06.02 SHORTNESS OF BREATH: Status: ACTIVE | Noted: 2019-06-02

## 2019-06-02 PROBLEM — A08.39 ENTEROVIRUS ENTERITIS: Status: ACTIVE | Noted: 2019-06-02

## 2019-06-02 PROBLEM — S72.432A: Status: RESOLVED | Noted: 2018-11-24 | Resolved: 2019-06-02

## 2019-06-02 LAB
CULTURE, RESPIRATORY: NORMAL
INR BLD: 1.6
METER GLUCOSE: 80 MG/DL (ref 74–99)
PROTHROMBIN TIME: 18.2 SEC (ref 9.3–12.4)
SMEAR, RESPIRATORY: NORMAL

## 2019-06-02 PROCEDURE — 6370000000 HC RX 637 (ALT 250 FOR IP): Performed by: FAMILY MEDICINE

## 2019-06-02 PROCEDURE — 2580000003 HC RX 258: Performed by: FAMILY MEDICINE

## 2019-06-02 PROCEDURE — 85610 PROTHROMBIN TIME: CPT

## 2019-06-02 PROCEDURE — 82962 GLUCOSE BLOOD TEST: CPT

## 2019-06-02 PROCEDURE — 36415 COLL VENOUS BLD VENIPUNCTURE: CPT

## 2019-06-02 RX ADMIN — FERROUS SULFATE TAB 325 MG (65 MG ELEMENTAL FE) 325 MG: 325 (65 FE) TAB at 09:15

## 2019-06-02 RX ADMIN — GUAIFENESIN 400 MG: 400 TABLET ORAL at 09:15

## 2019-06-02 RX ADMIN — HYDRALAZINE HYDROCHLORIDE 50 MG: 50 TABLET, FILM COATED ORAL at 09:15

## 2019-06-02 RX ADMIN — GABAPENTIN 300 MG: 300 CAPSULE ORAL at 09:15

## 2019-06-02 RX ADMIN — GLIMEPIRIDE 2 MG: 2 TABLET ORAL at 06:36

## 2019-06-02 RX ADMIN — CALCITRIOL 0.25 MCG: 0.25 CAPSULE ORAL at 09:15

## 2019-06-02 RX ADMIN — AMLODIPINE BESYLATE 10 MG: 10 TABLET ORAL at 09:15

## 2019-06-02 RX ADMIN — Medication 10 ML: at 09:15

## 2019-06-02 ASSESSMENT — PAIN SCALES - GENERAL
PAINLEVEL_OUTOF10: 0
PAINLEVEL_OUTOF10: 0

## 2019-06-02 NOTE — DISCHARGE SUMMARY
Admit Date: 4/27/2019 10:49 PM   Discharge Date: 5/3/2019         Present on Admission:   (Resolved) Acute respiratory failure with hypoxia (HCC)   Pneumonia, bacterial   Renal failure, acute on chronic (HCC)   Acute pulmonary embolism ([facility] [address]        Jocelynn SANTOS   Home Medication Instructions IAS:[id number]    Printed on:06/02/19 6239   Medication Information                      ALPRAZolam (XANAX) 0.25 MG tablet  Take 1 tablet by mouth every 8 hours as needed for Anxiety for up to 30 days. amLODIPine (NORVASC) 10 MG tablet  Take 10 mg by mouth daily             calcitRIOL (ROCALTROL) 0.25 MCG capsule  Take 1 capsule by mouth daily             ferrous sulfate (FE TABS) 325 (65 FE) MG EC tablet  Take 1 tablet by mouth 2 times daily             gabapentin (NEURONTIN) 300 MG capsule  Take 1 capsule by mouth 3 times daily             hydrALAZINE (APRESOLINE) 50 MG tablet  Take 1 tablet by mouth 3 times daily             HYDROcodone-acetaminophen (NORCO) 5-325 MG per tablet  Take 1 tablet by mouth every 6 hours as needed for Pain for up to 7 days. warfarin (COUMADIN) 4 MG tablet  Take 1 tablet by mouth daily                  Hospital Course/Procedures: 70year-old presented to the emergency room on 4/27/19 with complaints of shortness of breath or pleuritic pain and cough. She had just returned from a trip to the [place] [address]. This was a 5 hour car trip both ways. Chest x-ray showed RIGHT lower lobe pneumonia. D-dimer was elevated. VQ scan showed a RIGHT upper lobe PE. The patient was admitted to telemetry. Pulmonary was consulted. Patient was initially placed on Rocephin and azithromycin for community-acquired pneumonia. Pharmacy switched her from azithromycin to doxycycline due to QT elongation. Patient was placed on IV heparin  Ultrasounds of the lower extremities were within normal limits. 2-D echo showed no evidence of RIGHT. ventricular strain. Patient complained of nausea therefore

## 2019-06-02 NOTE — PROGRESS NOTES
Subjective: The patient is awake and alert. No problems overnight. Denies chest pain, angina, and dyspnea. Denies abdominal pain. Tolerating diet. No nausea or vomiting. Objective:    /72   Pulse 69   Temp 98.8 °F (37.1 °C) (Temporal)   Resp 16   Ht 5' 2\" (1.575 m)   Wt 276 lb (125.2 kg)   SpO2 92%   Breastfeeding? No   BMI 50.48 kg/m²     Heart:  RRR, no murmurs, gallops, or rubs.   Lungs:  CTA bilaterally, no wheeze, rales or rhonchi  Abd: bowel sounds present, nontender, nondistended, no masses  Extrem:  No clubbing, cyanosis, or edema    CBC with Differential:    Lab Results   Component Value Date    WBC 11.1 05/31/2019    RBC 3.34 05/31/2019    HGB 10.0 05/31/2019    HCT 33.1 05/31/2019     05/31/2019    MCV 99.1 05/31/2019    MCH 29.9 05/31/2019    MCHC 30.2 05/31/2019    RDW 13.2 05/31/2019    SEGSPCT 71 08/16/2013    BANDSPCT 1 03/29/2016    METASPCT 1 07/03/2011    LYMPHOPCT 10.6 05/31/2019    MONOPCT 9.5 05/31/2019    MYELOPCT 1 06/26/2011    BASOPCT 0.4 05/31/2019    MONOSABS 1.06 05/31/2019    LYMPHSABS 1.18 05/31/2019    EOSABS 0.40 05/31/2019    BASOSABS 0.04 05/31/2019     CMP:    Lab Results   Component Value Date     05/31/2019    K 4.9 05/31/2019     05/31/2019    CO2 21 05/31/2019    BUN 43 05/31/2019    CREATININE 3.4 05/31/2019    GFRAA 16 05/31/2019    LABGLOM 13 05/31/2019    GLUCOSE 285 05/31/2019    GLUCOSE 149 10/12/2011    PROT 6.9 05/31/2019    LABALBU 3.5 05/31/2019    LABALBU 4.1 10/12/2011    CALCIUM 8.6 05/31/2019    BILITOT <0.2 05/31/2019    ALKPHOS 78 05/31/2019    AST 12 05/31/2019    ALT 10 05/31/2019     PT/INR:    Lab Results   Component Value Date    PROTIME 18.2 06/02/2019    PROTIME 15.3 06/25/2011    INR 1.6 06/02/2019        Assessment:    Patient Active Problem List   Diagnosis    Neurologic gait dysfunction    [facility] failure, acute on chronic ([facility]    Diabetes mellitus ([facility]    Hypertension    Arthritis    Kidney disease  Knee problem    Anemia due to stage 3 chronic kidney disease (HCC)    Pneumonia, bacterial    Acute pulmonary embolism [facility])       Plan:  Home        Susannah Hyde  8:25 AM  6/2/2019

## 2019-06-02 NOTE — PROGRESS NOTES
NABIL on call Ochoa Corona notified that patient is being discharged and Justin Ruby needs notified.

## 2019-06-02 NOTE — CARE COORDINATION
SOCIAL WORK/CASEMANAGEMENT TRANSITION OF CARE PLANNING: I spoke with rep from [facility] and faxed her the [address] orders for discharge today.  Gloria Delgados 6/2/2019

## 2019-06-30 NOTE — DISCHARGE SUMMARY
rhinovirus/enterovirus. Patient was placed on O2 fluids and aerosols. Strep a Legionella studies were negative. Repeat CT of the chest showed complete clearing of her infiltrate from the previous admission. CT did show some lymphadenopathy from previous history of sarcoidosis. CT of the abdomen was unremarkable. Respiratory symptoms rapidly improved and patient was discharged in stable condition on 6/2/19.     Consultants Following:Pulmonology    Disposition:Home    Follow-up:PCP and pulmonology      Ayse Garcia  6/30/2019  10:54 AM

## 2019-07-15 ENCOUNTER — HOSPITAL ENCOUNTER (EMERGENCY)
Age: 69
Discharge: HOME | DRG: 683 | End: 2019-07-15
Payer: MEDICARE

## 2019-07-15 VITALS
HEART RATE: 76 BPM | WEIGHT: 250 LBS | HEIGHT: 62 IN | RESPIRATION RATE: 16 BRPM | SYSTOLIC BLOOD PRESSURE: 172 MMHG | DIASTOLIC BLOOD PRESSURE: 76 MMHG | BODY MASS INDEX: 46.01 KG/M2 | TEMPERATURE: 98.8 F | OXYGEN SATURATION: 96 %

## 2019-07-15 DIAGNOSIS — R03.0 ELEVATED BLOOD PRESSURE READING: ICD-10-CM

## 2019-07-15 DIAGNOSIS — L03.116 CELLULITIS OF LEFT LOWER EXTREMITY: Primary | ICD-10-CM

## 2019-07-15 PROCEDURE — 6370000000 HC RX 637 (ALT 250 FOR IP): Performed by: NURSE PRACTITIONER

## 2019-07-15 PROCEDURE — 99282 EMERGENCY DEPT VISIT SF MDM: CPT

## 2019-07-15 RX ORDER — CLINDAMYCIN HYDROCHLORIDE 150 MG/1
300 CAPSULE ORAL ONCE
Status: COMPLETED | OUTPATIENT
Start: 2019-07-15 | End: 2019-07-15

## 2019-07-15 RX ORDER — CLINDAMYCIN HYDROCHLORIDE 300 MG/1
300 CAPSULE ORAL 3 TIMES DAILY
Qty: 30 CAPSULE | Refills: 0 | Status: ON HOLD | OUTPATIENT
Start: 2019-07-15 | End: 2019-07-20 | Stop reason: HOSPADM

## 2019-07-15 RX ADMIN — CLINDAMYCIN HYDROCHLORIDE 300 MG: 150 CAPSULE ORAL at 19:45

## 2019-07-18 ENCOUNTER — APPOINTMENT (OUTPATIENT)
Dept: GENERAL RADIOLOGY | Age: 69
DRG: 683 | End: 2019-07-18
Payer: MEDICARE

## 2019-07-18 ENCOUNTER — APPOINTMENT (OUTPATIENT)
Dept: ULTRASOUND IMAGING | Age: 69
DRG: 683 | End: 2019-07-18
Payer: MEDICARE

## 2019-07-18 ENCOUNTER — HOSPITAL ENCOUNTER (INPATIENT)
Age: 69
LOS: 2 days | Discharge: HOME | DRG: 683 | End: 2019-07-20
Attending: EMERGENCY MEDICINE | Admitting: FAMILY MEDICINE
Payer: MEDICARE

## 2019-07-18 DIAGNOSIS — E87.5 HYPERKALEMIA: ICD-10-CM

## 2019-07-18 DIAGNOSIS — N17.9 AKI (ACUTE KIDNEY INJURY) (HCC): Primary | ICD-10-CM

## 2019-07-18 DIAGNOSIS — E87.0 HYPERNATREMIA: ICD-10-CM

## 2019-07-18 DIAGNOSIS — N39.0 URINARY TRACT INFECTION WITHOUT HEMATURIA, SITE UNSPECIFIED: ICD-10-CM

## 2019-07-18 DIAGNOSIS — E16.2 HYPOGLYCEMIA: ICD-10-CM

## 2019-07-18 LAB
ALBUMIN SERPL-MCNC: 3.6 G/DL (ref 3.5–5.2)
ALP BLD-CCNC: 69 U/L (ref 35–104)
ALT SERPL-CCNC: 10 U/L (ref 0–32)
ANION GAP SERPL CALCULATED.3IONS-SCNC: 11 MMOL/L (ref 7–16)
AST SERPL-CCNC: 17 U/L (ref 0–31)
BACTERIA: ABNORMAL /HPF
BASOPHILS ABSOLUTE: 0.04 E9/L (ref 0–0.2)
BASOPHILS RELATIVE PERCENT: 0.4 % (ref 0–2)
BILIRUB SERPL-MCNC: <0.2 MG/DL (ref 0–1.2)
BILIRUBIN URINE: NEGATIVE
BLOOD, URINE: ABNORMAL
BUN BLDV-MCNC: 46 MG/DL (ref 8–23)
CALCIUM SERPL-MCNC: 8.9 MG/DL (ref 8.6–10.2)
CHLORIDE BLD-SCNC: 116 MMOL/L (ref 98–107)
CHLORIDE URINE RANDOM: 89 MMOL/L
CHP ED QC CHECK: NORMAL
CLARITY: ABNORMAL
CO2: 23 MMOL/L (ref 22–29)
COLOR: YELLOW
CREAT SERPL-MCNC: 4.8 MG/DL (ref 0.5–1)
CREATININE URINE: 70 MG/DL (ref 29–226)
EKG ATRIAL RATE: 92 BPM
EKG P AXIS: 70 DEGREES
EKG P-R INTERVAL: 214 MS
EKG Q-T INTERVAL: 426 MS
EKG QRS DURATION: 136 MS
EKG QTC CALCULATION (BAZETT): 526 MS
EKG R AXIS: -30 DEGREES
EKG T AXIS: 104 DEGREES
EKG VENTRICULAR RATE: 92 BPM
EOSINOPHILS ABSOLUTE: 0.14 E9/L (ref 0.05–0.5)
EOSINOPHILS RELATIVE PERCENT: 1.5 % (ref 0–6)
GFR AFRICAN AMERICAN: 11
GFR NON-AFRICAN AMERICAN: 9 ML/MIN/1.73
GLUCOSE BLD-MCNC: 86 MG/DL
GLUCOSE BLD-MCNC: 98 MG/DL (ref 74–99)
GLUCOSE URINE: NEGATIVE MG/DL
HCT VFR BLD CALC: 32.3 % (ref 34–48)
HEMOGLOBIN: 9.7 G/DL (ref 11.5–15.5)
IMMATURE GRANULOCYTES #: 0.08 E9/L
IMMATURE GRANULOCYTES %: 0.9 % (ref 0–5)
INR BLD: 1.2
KETONES, URINE: NEGATIVE MG/DL
LACTIC ACID: 1.2 MMOL/L (ref 0.5–2.2)
LEUKOCYTE ESTERASE, URINE: ABNORMAL
LYMPHOCYTES ABSOLUTE: 0.82 E9/L (ref 1.5–4)
LYMPHOCYTES RELATIVE PERCENT: 8.8 % (ref 20–42)
MCH RBC QN AUTO: 30.7 PG (ref 26–35)
MCHC RBC AUTO-ENTMCNC: 30 % (ref 32–34.5)
MCV RBC AUTO: 102.2 FL (ref 80–99.9)
MONOCYTES ABSOLUTE: 0.8 E9/L (ref 0.1–0.95)
MONOCYTES RELATIVE PERCENT: 8.6 % (ref 2–12)
NEUTROPHILS ABSOLUTE: 7.45 E9/L (ref 1.8–7.3)
NEUTROPHILS RELATIVE PERCENT: 79.8 % (ref 43–80)
NITRITE, URINE: NEGATIVE
PDW BLD-RTO: 14.1 FL (ref 11.5–15)
PH UA: 6 (ref 5–9)
PLATELET # BLD: 203 E9/L (ref 130–450)
PMV BLD AUTO: 10.4 FL (ref 7–12)
POTASSIUM SERPL-SCNC: 5.1 MMOL/L (ref 3.5–5)
POTASSIUM, UR: 31 MMOL/L
PROTEIN UA: >=300 MG/DL
PROTHROMBIN TIME: 14.1 SEC (ref 9.3–12.4)
RBC # BLD: 3.16 E12/L (ref 3.5–5.5)
RBC UA: ABNORMAL /HPF (ref 0–2)
SODIUM BLD-SCNC: 150 MMOL/L (ref 132–146)
SODIUM URINE: 114 MMOL/L
SPECIFIC GRAVITY UA: 1.02 (ref 1–1.03)
TOTAL PROTEIN: 6.8 G/DL (ref 6.4–8.3)
TROPONIN: 0.02 NG/ML (ref 0–0.03)
UROBILINOGEN, URINE: 0.2 E.U./DL
WBC # BLD: 9.3 E9/L (ref 4.5–11.5)
WBC UA: >20 /HPF (ref 0–5)

## 2019-07-18 PROCEDURE — 2580000003 HC RX 258: Performed by: FAMILY MEDICINE

## 2019-07-18 PROCEDURE — 84484 ASSAY OF TROPONIN QUANT: CPT

## 2019-07-18 PROCEDURE — 84133 ASSAY OF URINE POTASSIUM: CPT

## 2019-07-18 PROCEDURE — 87077 CULTURE AEROBIC IDENTIFY: CPT

## 2019-07-18 PROCEDURE — 2060000000 HC ICU INTERMEDIATE R&B

## 2019-07-18 PROCEDURE — 6360000002 HC RX W HCPCS: Performed by: EMERGENCY MEDICINE

## 2019-07-18 PROCEDURE — 76770 US EXAM ABDO BACK WALL COMP: CPT

## 2019-07-18 PROCEDURE — 85610 PROTHROMBIN TIME: CPT

## 2019-07-18 PROCEDURE — 87186 SC STD MICRODIL/AGAR DIL: CPT

## 2019-07-18 PROCEDURE — 36415 COLL VENOUS BLD VENIPUNCTURE: CPT

## 2019-07-18 PROCEDURE — 84300 ASSAY OF URINE SODIUM: CPT

## 2019-07-18 PROCEDURE — 82570 ASSAY OF URINE CREATININE: CPT

## 2019-07-18 PROCEDURE — 87205 SMEAR GRAM STAIN: CPT

## 2019-07-18 PROCEDURE — 6360000002 HC RX W HCPCS: Performed by: FAMILY MEDICINE

## 2019-07-18 PROCEDURE — 6370000000 HC RX 637 (ALT 250 FOR IP): Performed by: FAMILY MEDICINE

## 2019-07-18 PROCEDURE — 80053 COMPREHEN METABOLIC PANEL: CPT

## 2019-07-18 PROCEDURE — 2580000003 HC RX 258: Performed by: NURSE PRACTITIONER

## 2019-07-18 PROCEDURE — 99285 EMERGENCY DEPT VISIT HI MDM: CPT

## 2019-07-18 PROCEDURE — 82436 ASSAY OF URINE CHLORIDE: CPT

## 2019-07-18 PROCEDURE — 85025 COMPLETE CBC W/AUTO DIFF WBC: CPT

## 2019-07-18 PROCEDURE — 93005 ELECTROCARDIOGRAM TRACING: CPT | Performed by: EMERGENCY MEDICINE

## 2019-07-18 PROCEDURE — 2580000003 HC RX 258: Performed by: EMERGENCY MEDICINE

## 2019-07-18 PROCEDURE — 73060 X-RAY EXAM OF HUMERUS: CPT

## 2019-07-18 PROCEDURE — 83605 ASSAY OF LACTIC ACID: CPT

## 2019-07-18 PROCEDURE — 96365 THER/PROPH/DIAG IV INF INIT: CPT

## 2019-07-18 PROCEDURE — 87088 URINE BACTERIA CULTURE: CPT

## 2019-07-18 PROCEDURE — 93010 ELECTROCARDIOGRAM REPORT: CPT | Performed by: INTERNAL MEDICINE

## 2019-07-18 PROCEDURE — 51702 INSERT TEMP BLADDER CATH: CPT

## 2019-07-18 PROCEDURE — 81001 URINALYSIS AUTO W/SCOPE: CPT

## 2019-07-18 PROCEDURE — 71045 X-RAY EXAM CHEST 1 VIEW: CPT

## 2019-07-18 RX ORDER — HYDRALAZINE HYDROCHLORIDE 50 MG/1
50 TABLET, FILM COATED ORAL 3 TIMES DAILY
Status: DISCONTINUED | OUTPATIENT
Start: 2019-07-18 | End: 2019-07-20 | Stop reason: HOSPADM

## 2019-07-18 RX ORDER — 0.9 % SODIUM CHLORIDE 0.9 %
1000 INTRAVENOUS SOLUTION INTRAVENOUS ONCE
Status: COMPLETED | OUTPATIENT
Start: 2019-07-18 | End: 2019-07-18

## 2019-07-18 RX ORDER — OXYCODONE HYDROCHLORIDE AND ACETAMINOPHEN 5; 325 MG/1; MG/1
1 TABLET ORAL EVERY 6 HOURS PRN
Status: DISCONTINUED | OUTPATIENT
Start: 2019-07-18 | End: 2019-07-20 | Stop reason: HOSPADM

## 2019-07-18 RX ORDER — WARFARIN SODIUM 2 MG/1
2 TABLET ORAL NIGHTLY
Status: ON HOLD | COMMUNITY
End: 2019-08-14 | Stop reason: HOSPADM

## 2019-07-18 RX ORDER — AMLODIPINE BESYLATE 10 MG/1
10 TABLET ORAL DAILY
Status: DISCONTINUED | OUTPATIENT
Start: 2019-07-18 | End: 2019-07-20 | Stop reason: HOSPADM

## 2019-07-18 RX ORDER — ACETAMINOPHEN 325 MG/1
650 TABLET ORAL EVERY 4 HOURS PRN
Status: DISCONTINUED | OUTPATIENT
Start: 2019-07-18 | End: 2019-07-20 | Stop reason: HOSPADM

## 2019-07-18 RX ORDER — GABAPENTIN 300 MG/1
300 CAPSULE ORAL 3 TIMES DAILY
Status: DISCONTINUED | OUTPATIENT
Start: 2019-07-18 | End: 2019-07-20 | Stop reason: HOSPADM

## 2019-07-18 RX ORDER — SODIUM CHLORIDE 9 MG/ML
INJECTION, SOLUTION INTRAVENOUS CONTINUOUS
Status: DISCONTINUED | OUTPATIENT
Start: 2019-07-18 | End: 2019-07-18 | Stop reason: ALTCHOICE

## 2019-07-18 RX ORDER — CALCITRIOL 0.25 UG/1
0.25 CAPSULE, LIQUID FILLED ORAL DAILY
Status: DISCONTINUED | OUTPATIENT
Start: 2019-07-18 | End: 2019-07-20 | Stop reason: HOSPADM

## 2019-07-18 RX ORDER — SODIUM CHLORIDE 9 MG/ML
1000 INJECTION, SOLUTION INTRAVENOUS CONTINUOUS
Status: DISCONTINUED | OUTPATIENT
Start: 2019-07-18 | End: 2019-07-20 | Stop reason: HOSPADM

## 2019-07-18 RX ORDER — WARFARIN SODIUM 2 MG/1
2 TABLET ORAL NIGHTLY
Status: DISCONTINUED | OUTPATIENT
Start: 2019-07-18 | End: 2019-07-19

## 2019-07-18 RX ORDER — FERROUS SULFATE 325(65) MG
325 TABLET ORAL
Status: DISCONTINUED | OUTPATIENT
Start: 2019-07-19 | End: 2019-07-20 | Stop reason: HOSPADM

## 2019-07-18 RX ORDER — SODIUM CHLORIDE 9 MG/ML
INJECTION, SOLUTION INTRAVENOUS CONTINUOUS
Status: DISCONTINUED | OUTPATIENT
Start: 2019-07-18 | End: 2019-07-18 | Stop reason: SDUPTHER

## 2019-07-18 RX ORDER — ONDANSETRON 2 MG/ML
4 INJECTION INTRAMUSCULAR; INTRAVENOUS EVERY 6 HOURS PRN
Status: DISCONTINUED | OUTPATIENT
Start: 2019-07-18 | End: 2019-07-20 | Stop reason: HOSPADM

## 2019-07-18 RX ORDER — FERROUS SULFATE 325(65) MG
325 TABLET ORAL
Status: ON HOLD | COMMUNITY
End: 2019-08-14 | Stop reason: HOSPADM

## 2019-07-18 RX ORDER — OXYCODONE HYDROCHLORIDE AND ACETAMINOPHEN 5; 325 MG/1; MG/1
1 TABLET ORAL EVERY 6 HOURS PRN
Status: ON HOLD | COMMUNITY
End: 2019-08-14 | Stop reason: HOSPADM

## 2019-07-18 RX ADMIN — CALCITRIOL 0.25 MCG: 0.25 CAPSULE ORAL at 13:30

## 2019-07-18 RX ADMIN — OXYCODONE HYDROCHLORIDE AND ACETAMINOPHEN 1 TABLET: 5; 325 TABLET ORAL at 13:29

## 2019-07-18 RX ADMIN — CEFTRIAXONE SODIUM 1 G: 1 INJECTION, POWDER, FOR SOLUTION INTRAMUSCULAR; INTRAVENOUS at 15:18

## 2019-07-18 RX ADMIN — AMLODIPINE BESYLATE 10 MG: 10 TABLET ORAL at 13:30

## 2019-07-18 RX ADMIN — WARFARIN SODIUM 2 MG: 2 TABLET ORAL at 18:18

## 2019-07-18 RX ADMIN — GABAPENTIN 300 MG: 300 CAPSULE ORAL at 20:29

## 2019-07-18 RX ADMIN — SODIUM CHLORIDE 1000 ML: 9 INJECTION, SOLUTION INTRAVENOUS at 09:16

## 2019-07-18 RX ADMIN — SODIUM CHLORIDE 1000 ML: 9 INJECTION, SOLUTION INTRAVENOUS at 15:18

## 2019-07-18 RX ADMIN — HYDRALAZINE HYDROCHLORIDE 50 MG: 50 TABLET, FILM COATED ORAL at 13:29

## 2019-07-18 RX ADMIN — HYDRALAZINE HYDROCHLORIDE 50 MG: 50 TABLET, FILM COATED ORAL at 20:29

## 2019-07-18 RX ADMIN — GABAPENTIN 300 MG: 300 CAPSULE ORAL at 13:29

## 2019-07-18 RX ADMIN — CEFTRIAXONE SODIUM 1 G: 1 INJECTION, POWDER, FOR SOLUTION INTRAMUSCULAR; INTRAVENOUS at 10:22

## 2019-07-18 RX ADMIN — OXYCODONE HYDROCHLORIDE AND ACETAMINOPHEN 1 TABLET: 5; 325 TABLET ORAL at 20:28

## 2019-07-18 ASSESSMENT — PAIN SCALES - GENERAL
PAINLEVEL_OUTOF10: 8
PAINLEVEL_OUTOF10: 0
PAINLEVEL_OUTOF10: 5
PAINLEVEL_OUTOF10: 0

## 2019-07-18 ASSESSMENT — PAIN DESCRIPTION - ONSET: ONSET: ON-GOING

## 2019-07-18 ASSESSMENT — PAIN DESCRIPTION - LOCATION: LOCATION: SHOULDER

## 2019-07-18 ASSESSMENT — PAIN DESCRIPTION - FREQUENCY: FREQUENCY: CONTINUOUS

## 2019-07-18 ASSESSMENT — PAIN DESCRIPTION - PROGRESSION: CLINICAL_PROGRESSION: NOT CHANGED

## 2019-07-18 ASSESSMENT — PAIN - FUNCTIONAL ASSESSMENT: PAIN_FUNCTIONAL_ASSESSMENT: PREVENTS OR INTERFERES SOME ACTIVE ACTIVITIES AND ADLS

## 2019-07-18 ASSESSMENT — PAIN DESCRIPTION - ORIENTATION: ORIENTATION: RIGHT

## 2019-07-18 ASSESSMENT — PAIN DESCRIPTION - DESCRIPTORS: DESCRIPTORS: DISCOMFORT;ACHING;CONSTANT

## 2019-07-18 NOTE — ED PROVIDER NOTES
hospital encounter of 07/18/19   CBC Auto Differential   Result Value Ref Range    WBC 9.3 4.5 - 11.5 E9/L    RBC 3.16 (L) 3.50 - 5.50 E12/L    Hemoglobin 9.7 (L) 11.5 - 15.5 g/dL    Hematocrit 32.3 (L) 34.0 - 48.0 %    .2 (H) 80.0 - 99.9 fL    MCH 30.7 26.0 - 35.0 pg    MCHC 30.0 (L) 32.0 - 34.5 %    RDW 14.1 11.5 - 15.0 fL    Platelets 267 875 - 503 E9/L    MPV 10.4 7.0 - 12.0 fL    Neutrophils % 79.8 43.0 - 80.0 %    Immature Granulocytes % 0.9 0.0 - 5.0 %    Lymphocytes % 8.8 (L) 20.0 - 42.0 %    Monocytes % 8.6 2.0 - 12.0 %    Eosinophils % 1.5 0.0 - 6.0 %    Basophils % 0.4 0.0 - 2.0 %    Neutrophils # 7.45 (H) 1.80 - 7.30 E9/L    Immature Granulocytes # 0.08 E9/L    Lymphocytes # 0.82 (L) 1.50 - 4.00 E9/L    Monocytes # 0.80 0.10 - 0.95 E9/L    Eosinophils # 0.14 0.05 - 0.50 E9/L    Basophils # 0.04 0.00 - 0.20 E9/L   Comprehensive Metabolic Panel   Result Value Ref Range    Sodium 150 (H) 132 - 146 mmol/L    Potassium 5.1 (H) 3.5 - 5.0 mmol/L    Chloride 116 (H) 98 - 107 mmol/L    CO2 23 22 - 29 mmol/L    Anion Gap 11 7 - 16 mmol/L    Glucose 98 74 - 99 mg/dL    BUN 46 (H) 8 - 23 mg/dL    CREATININE 4.8 (H) 0.5 - 1.0 mg/dL    GFR Non-African American 9 >=60 mL/min/1.73    GFR African American 11     Calcium 8.9 8.6 - 10.2 mg/dL    Total Protein 6.8 6.4 - 8.3 g/dL    Alb 3.6 3.5 - 5.2 g/dL    Total Bilirubin <0.2 0.0 - 1.2 mg/dL    Alkaline Phosphatase 69 35 - 104 U/L    ALT 10 0 - 32 U/L    AST 17 0 - 31 U/L   Lactic Acid, Plasma   Result Value Ref Range    Lactic Acid 1.2 0.5 - 2.2 mmol/L   Urinalysis   Result Value Ref Range    Color, UA Yellow Straw/Yellow    Clarity, UA CLOUDY (A) Clear    Glucose, Ur Negative Negative mg/dL    Bilirubin Urine Negative Negative    Ketones, Urine Negative Negative mg/dL    Specific Gravity, UA 1.020 1.005 - 1.030    Blood, Urine TRACE-INTACT Negative    pH, UA 6.0 5.0 - 9.0    Protein, UA >=300 (A) Negative mg/dL    Urobilinogen, Urine 0.2 <2.0 E.U./dL    Nitrite,

## 2019-07-18 NOTE — PROGRESS NOTES
Consult Note  NEPHROLOGY    Reason for Consult:  Management of CKD Stage 5    Requesting Physician:  Dr Fabricio Keyes    Chief Complaint:  Admitted w/ PERLA and UTI    History Obtained From:  patient, electronic medical record    History of Present Ilness: This is a 70 y.o.  female with a H/O CKD Stage 5 followed by Dr Jasmina Clark of our practice last seen in office 18 who now presents to ED with Hypoglycemia. Vitals upon arrival included BP (!) 157/71   Pulse 87   Temp 97.5 °F (36.4 °C) (Oral)   Resp 18   SpO2 97%. Pertinent labs included WBC 9.3 and Hgb 9.7. Na 150, K 5.1, Chl 116, CO2 23, BUN 46 and creat 4.8. Patient was subsequently admitted with PERLA and UTI. Currently upon exam pt is in no acute distress: Patient states her right arm went weak morning of admission. She called EMS, found with a blood sugar of 40, did provide glucose which did improve her symptoms. She currently has no symptoms or complaints. She is being treated for a left lower extremity cellulitis with clindamycin at this time, this was started . No CP or SOB at this time. No N/V/D. Notes increased frequency. No dysuria.       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 Verner Laine    CYSTOSCOPY       for kidney stones    FOOT SURGERY       right     UPPER GASTROINTESTINAL ENDOSCOPY  2.18.15    Dr. Noel Aguilar Findings: Mild Gastrits and Duodenitis, 2cm Hiatal Hernia       Current Medications:    Current Facility-Administered Medications: 0.9 % sodium chloride infusion, cardiomediastinal silhouette with mild central pulmonary vascular congestion with thoracic aortic vascular calcifications and nonspecific opacities in the lung bases. . No pneumothorax. 1. Stable, large cardiomediastinal silhouette with mild central pulmonary vascular congestion and thoracic aortic vascular calcifications. 2. Nonspecific bibasilar airspace disease, findings can be seen in infiltrate/pneumonia and/or atelectasis. .        Assessment/Plan    1. Chronic kidney disease at stage 5 (recent baseline scr 3.1 - 3.4) w/ Hx sarcoid  - Now above recent baseline in setting of mild dehydration and likely UTI  - She remains non oliguric w/ no uremic s/s  - will start gentle fluids  - check urine lytes  - follow strict I/Os and daily labs (no nicole at this time)     2. UTI  - started on Rocephin  - cultures pending     3. Hyperkalemia  - mild   - Has been on Valtessa in recent past  - follow    4. Hypernatremia  - likely in setting of mild dehydration  - follow response to gentle fluids     5. Anemia of ckd  - recent Fe levels stable   - consider ARUN if HgB drops further      6.  HTN  - at goal       Thank you for allowing us to participate in care of Ms Won Funez, PABLO - CNP  7/18/2019  1:31 PM     Pt seen and examined agree with above  Follow cr on ivf  Being treated for uti and le cellulitis  Kj Leon

## 2019-07-19 PROBLEM — N39.0 UTI (URINARY TRACT INFECTION): Status: ACTIVE | Noted: 2019-07-19

## 2019-07-19 PROBLEM — E11.649 HYPOGLYCEMIA ASSOCIATED WITH DIABETES (HCC): Status: ACTIVE | Noted: 2019-07-19

## 2019-07-19 LAB
ALBUMIN SERPL-MCNC: 2.7 G/DL (ref 3.5–5.2)
ALP BLD-CCNC: 57 U/L (ref 35–104)
ALT SERPL-CCNC: 9 U/L (ref 0–32)
ANION GAP SERPL CALCULATED.3IONS-SCNC: 12 MMOL/L (ref 7–16)
AST SERPL-CCNC: 17 U/L (ref 0–31)
BASOPHILS ABSOLUTE: 0.05 E9/L (ref 0–0.2)
BASOPHILS RELATIVE PERCENT: 0.6 % (ref 0–2)
BILIRUB SERPL-MCNC: <0.2 MG/DL (ref 0–1.2)
BUN BLDV-MCNC: 37 MG/DL (ref 8–23)
CALCIUM SERPL-MCNC: 8.3 MG/DL (ref 8.6–10.2)
CHLORIDE BLD-SCNC: 116 MMOL/L (ref 98–107)
CO2: 17 MMOL/L (ref 22–29)
CREAT SERPL-MCNC: 4.2 MG/DL (ref 0.5–1)
EOSINOPHIL, URINE: 0 % (ref 0–1)
EOSINOPHILS ABSOLUTE: 0.24 E9/L (ref 0.05–0.5)
EOSINOPHILS RELATIVE PERCENT: 2.9 % (ref 0–6)
GFR AFRICAN AMERICAN: 13
GFR NON-AFRICAN AMERICAN: 10 ML/MIN/1.73
GLUCOSE BLD-MCNC: 96 MG/DL (ref 74–99)
HCT VFR BLD CALC: 28.5 % (ref 34–48)
HEMOGLOBIN: 8.6 G/DL (ref 11.5–15.5)
IMMATURE GRANULOCYTES #: 0.06 E9/L
IMMATURE GRANULOCYTES %: 0.7 % (ref 0–5)
INR BLD: 1.4
LYMPHOCYTES ABSOLUTE: 1.06 E9/L (ref 1.5–4)
LYMPHOCYTES RELATIVE PERCENT: 12.8 % (ref 20–42)
MCH RBC QN AUTO: 30.7 PG (ref 26–35)
MCHC RBC AUTO-ENTMCNC: 30.2 % (ref 32–34.5)
MCV RBC AUTO: 101.8 FL (ref 80–99.9)
METER GLUCOSE: 258 MG/DL (ref 74–99)
MONOCYTES ABSOLUTE: 0.83 E9/L (ref 0.1–0.95)
MONOCYTES RELATIVE PERCENT: 10 % (ref 2–12)
NEUTROPHILS ABSOLUTE: 6.02 E9/L (ref 1.8–7.3)
NEUTROPHILS RELATIVE PERCENT: 73 % (ref 43–80)
PDW BLD-RTO: 14 FL (ref 11.5–15)
PHOSPHORUS: 4.3 MG/DL (ref 2.5–4.5)
PLATELET # BLD: 198 E9/L (ref 130–450)
PMV BLD AUTO: 10.6 FL (ref 7–12)
POTASSIUM REFLEX MAGNESIUM: 5.5 MMOL/L (ref 3.5–5)
PROTHROMBIN TIME: 15.8 SEC (ref 9.3–12.4)
RBC # BLD: 2.8 E12/L (ref 3.5–5.5)
SODIUM BLD-SCNC: 145 MMOL/L (ref 132–146)
TOTAL PROTEIN: 5.4 G/DL (ref 6.4–8.3)
URIC ACID, SERUM: 7.7 MG/DL (ref 2.4–5.7)
WBC # BLD: 8.3 E9/L (ref 4.5–11.5)

## 2019-07-19 PROCEDURE — 2580000003 HC RX 258: Performed by: NURSE PRACTITIONER

## 2019-07-19 PROCEDURE — 6360000002 HC RX W HCPCS: Performed by: INTERNAL MEDICINE

## 2019-07-19 PROCEDURE — 36415 COLL VENOUS BLD VENIPUNCTURE: CPT

## 2019-07-19 PROCEDURE — 2580000003 HC RX 258: Performed by: FAMILY MEDICINE

## 2019-07-19 PROCEDURE — 84550 ASSAY OF BLOOD/URIC ACID: CPT

## 2019-07-19 PROCEDURE — 97161 PT EVAL LOW COMPLEX 20 MIN: CPT

## 2019-07-19 PROCEDURE — 85610 PROTHROMBIN TIME: CPT

## 2019-07-19 PROCEDURE — 2060000000 HC ICU INTERMEDIATE R&B

## 2019-07-19 PROCEDURE — 85025 COMPLETE CBC W/AUTO DIFF WBC: CPT

## 2019-07-19 PROCEDURE — 82962 GLUCOSE BLOOD TEST: CPT

## 2019-07-19 PROCEDURE — 6370000000 HC RX 637 (ALT 250 FOR IP): Performed by: FAMILY MEDICINE

## 2019-07-19 PROCEDURE — 84100 ASSAY OF PHOSPHORUS: CPT

## 2019-07-19 PROCEDURE — 80053 COMPREHEN METABOLIC PANEL: CPT

## 2019-07-19 PROCEDURE — 97110 THERAPEUTIC EXERCISES: CPT

## 2019-07-19 PROCEDURE — 6360000002 HC RX W HCPCS: Performed by: FAMILY MEDICINE

## 2019-07-19 RX ORDER — WARFARIN SODIUM 5 MG/1
5 TABLET ORAL NIGHTLY
Status: DISCONTINUED | OUTPATIENT
Start: 2019-07-19 | End: 2019-07-20 | Stop reason: HOSPADM

## 2019-07-19 RX ADMIN — DARBEPOETIN ALFA 100 MCG: 100 INJECTION, SOLUTION INTRAVENOUS; SUBCUTANEOUS at 14:37

## 2019-07-19 RX ADMIN — OXYCODONE HYDROCHLORIDE AND ACETAMINOPHEN 1 TABLET: 5; 325 TABLET ORAL at 08:15

## 2019-07-19 RX ADMIN — AMLODIPINE BESYLATE 10 MG: 10 TABLET ORAL at 08:12

## 2019-07-19 RX ADMIN — GABAPENTIN 300 MG: 300 CAPSULE ORAL at 08:13

## 2019-07-19 RX ADMIN — HYDRALAZINE HYDROCHLORIDE 50 MG: 50 TABLET, FILM COATED ORAL at 14:36

## 2019-07-19 RX ADMIN — GABAPENTIN 300 MG: 300 CAPSULE ORAL at 14:37

## 2019-07-19 RX ADMIN — HYDRALAZINE HYDROCHLORIDE 50 MG: 50 TABLET, FILM COATED ORAL at 20:42

## 2019-07-19 RX ADMIN — FERROUS SULFATE TAB 325 MG (65 MG ELEMENTAL FE) 325 MG: 325 (65 FE) TAB at 08:13

## 2019-07-19 RX ADMIN — CALCITRIOL 0.25 MCG: 0.25 CAPSULE ORAL at 08:12

## 2019-07-19 RX ADMIN — CEFTRIAXONE SODIUM 1 G: 1 INJECTION, POWDER, FOR SOLUTION INTRAMUSCULAR; INTRAVENOUS at 13:08

## 2019-07-19 RX ADMIN — SODIUM CHLORIDE 1000 ML: 9 INJECTION, SOLUTION INTRAVENOUS at 20:42

## 2019-07-19 RX ADMIN — WARFARIN SODIUM 5 MG: 2 TABLET ORAL at 17:43

## 2019-07-19 RX ADMIN — OXYCODONE HYDROCHLORIDE AND ACETAMINOPHEN 1 TABLET: 5; 325 TABLET ORAL at 17:45

## 2019-07-19 RX ADMIN — HYDRALAZINE HYDROCHLORIDE 50 MG: 50 TABLET, FILM COATED ORAL at 08:13

## 2019-07-19 RX ADMIN — SODIUM CHLORIDE 1000 ML: 9 INJECTION, SOLUTION INTRAVENOUS at 08:13

## 2019-07-19 RX ADMIN — GABAPENTIN 300 MG: 300 CAPSULE ORAL at 20:42

## 2019-07-19 ASSESSMENT — PAIN SCALES - GENERAL
PAINLEVEL_OUTOF10: 0
PAINLEVEL_OUTOF10: 6
PAINLEVEL_OUTOF10: 7
PAINLEVEL_OUTOF10: 0

## 2019-07-19 NOTE — H&P
hematuria. MUSCULOSKELETAL:  Severe osteoarthritis both knees. At this time,  inoperable due to her weight. PHYSICAL EXAMINATION:  GENERAL:  The patient is lying in bed, eating breakfast.  She is in no  acute distress. She is alert and oriented x3. VITAL SIGNS:  Temperature 98.2, pulse 87, respirations 18, pressure  149/65, O2 sats 97% on room air. SKIN:  Shows no pallor or jaundice. CHEST:  Clear. HEART:  Regular rate and rhythm. NECK:  Supple without bruits or masses. ABDOMEN:  Obese, soft, nontender. EXTREMITIES:  Reveal varus deformities due to severe osteoarthritis both  knees. She has mild edema. She has palpable peripheral pulses. NEUROLOGIC:  She is alert and oriented x3 with no focal neurological  deficits at this point. LABORATORY DATA:  Reveal potassium 5.5, BUN of 37, creatinine is getting  closer to her baseline of 4.2. Hemoglobin 8.6, white count is normal,  platelet count is normal.  INR is 1.2 at this time. Urinalysis shows a  large amount of leukocytes, bacteria, wbc's and positive for leukocyte  esterase. DIAGNOSTIC IMPRESSION:  Acute kidney injury from dehydration,  hypoglycemia, possible UTI, she is subtherapeutic on her INR. PLAN:  Increase her Coumadin, await urine culture, monitor for further  hypoglycemia. She is currently on Amaryl, this will be discontinued  indefinitely. Discharge plan, most likely home tomorrow if stable.         Tatiana Martin MD    D: 07/19/2019 9:10:26       T: 07/19/2019 9:20:37     [email or web address]  Job#: [id number]     Doc#: [id number]    CC:

## 2019-07-19 NOTE — PROGRESS NOTES
Physical Therapy    Facility/Department: [facility]  Initial Assessment    NAME: Peter Cedeño  : 1950  MRN: [id number]    Date of Service: 2019  Evaluating Therapist: Jimmy Domingo PT, DPT    Room #: 5122R  DIAGNOSIS: PERLA  PRECAUTIONS: falls  Pertinent Medical History:   Past Medical History:   Diagnosis Date    Arthritis     knees    Chronic knee pain     Depression     Diabetes mellitus ([address]     type 2    Gall stones     Hypertension     Kidney disease     Kidney stones     Obesity     Sarcoidosis     SOBOE 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 Rudolph Nyhan    CYSTOSCOPY       for kidney stones    FOOT SURGERY  2009     right     UPPER GASTROINTESTINAL ENDOSCOPY  2.18.15    Dr. Triston Raman Findings: Mild Gastrits and Duodenitis, 2cm Hiatal Hernia        Social:    Pt lives alone. Pt's son lives a few doors down and other son lives closeby  Pt lives in a 2 story home with first floor setup, with 2 steps and 1 grab bar to enter, is having second railing installed to [employer]. Prior to admission pt used ww and required assistance as needed with IADLs. Independent with ADLs. Initial Evaluation  Date: 19 Treatment  Date: NA  Short Term/ Long Term   Goals   Was pt agreeable to Eval/treatment? Yes      Did pt report pain?   Pt reported mild R knee pain     Bed Mobility  Rolling: NT   Supine to sit: min A   Sit to supine: NT  Scooting: min A  Mod I   Transfers Sit to stand: min A   Stand to sit: SBA  Stand pivot: min A   Mod I   Ambulation   20 feet with ww with CGA   >50 feet with AAD with mod I   Stair negotiation: ascended and descended NT   2 steps with 1 rail with mod I   AMPAC Raw Score 18/24       Alertness/Orientation: x4  LE AROM: Grossly WFL   LE Strength: Grossly 4/5  Static Balance: SBA

## 2019-07-19 NOTE — PROGRESS NOTES
[facility]  Nephrology Attending Progress Note  Micheal Salgado. Gertrude Tucker MD        SUBJECTIVE:     7/18: This is a 70 y.o.  female with a H/O CKD Stage 5 followed by Dr Jasmina Clark of our practice last seen in office 2/13/18 who now presents to ED with Hypoglycemia. Vitals upon arrival included BP (!) 157/71   Pulse 87   Temp 97.5 °F (36.4 °C) (Oral)   Resp 18   SpO2 97%. Pertinent labs included WBC 9.3 and Hgb 9.7. Na 150, K 5.1, Chl 116, CO2 23, BUN 46 and creat 4.8. Patient was subsequently admitted with PERLA and UTI.     Currently upon exam pt is in no acute distress: Patient states her right arm went weak morning of admission. She called EMS, found with a blood sugar of 40, did provide glucose which did improve her symptoms.  She currently has no symptoms or complaints. She is being treated for a left lower extremity cellulitis with clindamycin at this time, this was started 7/16. No CP or SOB at this time. No N/V/D. Notes increased frequency. No dysuria. 7/19: no cp or sob.  Eating ok      PROBLEM LIST:    Patient Active Problem List   Diagnosis    Neurologic gait dysfunction    Dehydration    Renal failure, acute on chronic (HCC)    Diabetes mellitus ([address]    Hypertension    Arthritis    Kidney disease    Knee problem    Anemia due to stage 3 chronic kidney disease (HCC)    Pneumonia, bacterial    Acute pulmonary embolism (HCC)    Shortness of breath    Diarrhea of infectious origin    Acute bronchitis due to Rhinovirus    Enterovirus enteritis    PERLA (acute kidney injury) ([address]    Hypoglycemia associated with diabetes ([address]    UTI (urinary tract infection)        PAST MEDICAL HISTORY:    Past Medical History:   Diagnosis Date    Arthritis     knees    Chronic knee pain     Depression     Diabetes mellitus ([address]     type 2    Gall stones     Hypertension     Kidney disease     Kidney stones     Obesity     Sarcoidosis     SOBOE (shortness of breath on

## 2019-07-20 VITALS
TEMPERATURE: 97.6 F | SYSTOLIC BLOOD PRESSURE: 141 MMHG | OXYGEN SATURATION: 98 % | DIASTOLIC BLOOD PRESSURE: 65 MMHG | WEIGHT: 269.7 LBS | RESPIRATION RATE: 16 BRPM | BODY MASS INDEX: 49.63 KG/M2 | HEART RATE: 85 BPM | HEIGHT: 62 IN

## 2019-07-20 LAB
ANION GAP SERPL CALCULATED.3IONS-SCNC: 11 MMOL/L (ref 7–16)
BUN BLDV-MCNC: 33 MG/DL (ref 8–23)
CALCIUM SERPL-MCNC: 8.6 MG/DL (ref 8.6–10.2)
CHLORIDE BLD-SCNC: 115 MMOL/L (ref 98–107)
CO2: 17 MMOL/L (ref 22–29)
CREAT SERPL-MCNC: 3.9 MG/DL (ref 0.5–1)
GFR AFRICAN AMERICAN: 14
GFR NON-AFRICAN AMERICAN: 11 ML/MIN/1.73
GLUCOSE BLD-MCNC: 120 MG/DL (ref 74–99)
INR BLD: 1.2
ORGANISM: ABNORMAL
POTASSIUM SERPL-SCNC: 5.3 MMOL/L (ref 3.5–5)
PROTHROMBIN TIME: 14.2 SEC (ref 9.3–12.4)
SODIUM BLD-SCNC: 143 MMOL/L (ref 132–146)
URINE CULTURE, ROUTINE: ABNORMAL
URINE CULTURE, ROUTINE: ABNORMAL

## 2019-07-20 PROCEDURE — 80048 BASIC METABOLIC PNL TOTAL CA: CPT

## 2019-07-20 PROCEDURE — 36415 COLL VENOUS BLD VENIPUNCTURE: CPT

## 2019-07-20 PROCEDURE — 85610 PROTHROMBIN TIME: CPT

## 2019-07-20 PROCEDURE — 6370000000 HC RX 637 (ALT 250 FOR IP): Performed by: FAMILY MEDICINE

## 2019-07-20 RX ORDER — LEVOFLOXACIN 500 MG/1
250 TABLET, FILM COATED ORAL DAILY
Status: DISCONTINUED | OUTPATIENT
Start: 2019-07-20 | End: 2019-07-20 | Stop reason: HOSPADM

## 2019-07-20 RX ORDER — LEVOFLOXACIN 250 MG/1
250 TABLET ORAL DAILY
Qty: 5 TABLET | Refills: 0 | Status: SHIPPED | OUTPATIENT
Start: 2019-07-20 | End: 2019-07-25

## 2019-07-20 RX ADMIN — FERROUS SULFATE TAB 325 MG (65 MG ELEMENTAL FE) 325 MG: 325 (65 FE) TAB at 09:02

## 2019-07-20 RX ADMIN — AMLODIPINE BESYLATE 10 MG: 10 TABLET ORAL at 09:02

## 2019-07-20 RX ADMIN — HYDRALAZINE HYDROCHLORIDE 50 MG: 50 TABLET, FILM COATED ORAL at 09:02

## 2019-07-20 RX ADMIN — GABAPENTIN 300 MG: 300 CAPSULE ORAL at 09:02

## 2019-07-20 RX ADMIN — CALCITRIOL 0.25 MCG: 0.25 CAPSULE ORAL at 09:02

## 2019-07-20 ASSESSMENT — PAIN SCALES - GENERAL
PAINLEVEL_OUTOF10: 0

## 2019-07-20 NOTE — PROGRESS NOTES
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       DIET:    DIET RENAL;     PHYSICAL EXAM:     Patient Vitals for the past 24 hrs:   BP Temp Temp src Pulse Resp SpO2 Weight   07/20/19 0900 (!) 141/65 97.6 °F (36.4 °C) Temporal 85 16 98 % --   07/20/19 0448 -- -- -- -- -- -- 269 lb 11.2 oz (122.3 kg)   07/20/19 0015 (!) 172/72 97.7 °F (36.5 °C) Temporal 82 18 99 % --   07/19/19 2042 (!) 175/77 -- -- -- -- -- --   07/19/19 1545 (!) 171/72 97.3 °F (36.3 °C) Temporal 86 18 98 % --   07/19/19 1436 (!) 160/76 -- -- -- -- -- --   @      Intake/Output Summary (Last 24 hours) at 7/20/2019 1022  Last data filed at 7/20/2019 0524  Gross per 24 hour   Intake 2573.75 ml   Output 2250 ml   Net 323.75 ml         Wt Readings from Last 3 Encounters:   07/20/19 269 lb 11.2 oz (122.3 kg)   07/15/19 250 lb (113.4 kg)   05/30/19 276 lb (125.2 kg)       Constitutional:  Pt is in no acute distress  Head: normocephalic, atraumatic  Neck: no JVD  Cardiovascular: regular rate and rhythm, no murmurs, gallops, or rubs  Respiratory:  No rales, rhochi, or wheezes  Gastrointestinal:  Soft, nontender, nondistended, bowel sounds x 4  Ext: trace edema  Skin: dry, no rash  Neuro: aaox3    MEDS (scheduled):    warfarin  5 mg Oral Nightly    darbepoetin derek-polysorbate  100 mcg Subcutaneous Weekly    amLODIPine  10 mg Oral Daily    calcitRIOL  0.25 mcg Oral Daily    ferrous sulfate  325 mg Oral Daily with breakfast    gabapentin  300 mg Oral TID    hydrALAZINE  50 mg Oral TID    cefTRIAXone (ROCEPHIN) IV  1 g Intravenous Q24H       MEDS (infusions):   sodium chloride 75 mL/hr at 07/20/19 0524       MEDS (prn):  oxyCODONE-acetaminophen, ondansetron, magnesium hydroxide, acetaminophen    DATA:    Recent Labs     07/18/19  0908 07/19/19  0620   WBC 9.3 8.3   HGB 9.7* 8.6*   HCT 32.3* 28.5*   .2* 101.8*    198     Recent Labs     07/18/19  0908 07/19/19  0620 07/20/19  0628   * 145 143   K 5.1* 5.5* 5.3*   * 116* 115*   CO2 23 17* 17*   BUN 46* 37* 33*   CREATININE 4.8* 4.2* 3.9*   LABGLOM 9 10 11   GLUCOSE 98 96 120*   CALCIUM 8.9 8.3* 8.6   ALT 10 9  --    AST 17 17  --    BILITOT <0.2 <0.2  --    ALKPHOS 69 57  --    PHOS  --  4.3  --        Lab Results   Component Value Date    LABALBU 2.7 (L) 07/19/2019    LABALBU 3.6 07/18/2019    LABALBU 3.5 05/31/2019     Lab Results   Component Value Date    TSH 1.945 08/16/2013     No results found for: PHART, PO2ART, LPY2GAG    Iron Studies:   Lab Results   Component Value Date    IRON 120 05/02/2019    TIBC 246 (L) 05/02/2019    FERRITIN 362 05/02/2019         IMPRESSION/RECOMMENDATIONS:      1. Chronic kidney disease at stage 5 (recent baseline scr 3.1 - 3.4) w/ Hx sarcoid  - Now above recent baseline in setting of mild dehydration and likely UTI  - She remains non oliguric w/ no uremic s/s  - gentle fluids  - follow strict I/Os and daily labs (no nicole at this time)   cr improving    2. UTI  - started on Rocephin  - cultures pending     3. Hyperkalemia  - mild   - Has been on Valtessa in recent past  - follow     4. Hypernatremia  - likely in setting of mild dehydration  - follow response to gentle fluids  improving     5. Anemia of ckd  - recent Fe levels stable   - consider ARUN if HgB drops further      6. HTN  - follow on current meds        Wayne Esquivel.  Renna Holter, MD

## 2019-08-03 ENCOUNTER — HOSPITAL ENCOUNTER (EMERGENCY)
Age: 69
Discharge: HOME | End: 2019-08-03
Payer: MEDICARE

## 2019-08-03 ENCOUNTER — APPOINTMENT (OUTPATIENT)
Dept: GENERAL RADIOLOGY | Age: 69
End: 2019-08-03
Payer: MEDICARE

## 2019-08-03 VITALS
SYSTOLIC BLOOD PRESSURE: 160 MMHG | OXYGEN SATURATION: 98 % | DIASTOLIC BLOOD PRESSURE: 90 MMHG | BODY MASS INDEX: 46.93 KG/M2 | HEIGHT: 62 IN | RESPIRATION RATE: 16 BRPM | TEMPERATURE: 94.4 F | WEIGHT: 255 LBS | HEART RATE: 67 BPM

## 2019-08-03 DIAGNOSIS — S90.31XA CONTUSION OF RIGHT FOOT INCLUDING TOES, INITIAL ENCOUNTER: Primary | ICD-10-CM

## 2019-08-03 DIAGNOSIS — S90.121A CONTUSION OF RIGHT FOOT INCLUDING TOES, INITIAL ENCOUNTER: Primary | ICD-10-CM

## 2019-08-03 DIAGNOSIS — R03.0 ELEVATED BLOOD PRESSURE READING: ICD-10-CM

## 2019-08-03 PROCEDURE — 99283 EMERGENCY DEPT VISIT LOW MDM: CPT

## 2019-08-03 PROCEDURE — 73630 X-RAY EXAM OF FOOT: CPT

## 2019-08-03 PROCEDURE — 6370000000 HC RX 637 (ALT 250 FOR IP): Performed by: NURSE PRACTITIONER

## 2019-08-03 RX ORDER — HYDROCODONE BITARTRATE AND ACETAMINOPHEN 5; 325 MG/1; MG/1
1 TABLET ORAL ONCE
Status: COMPLETED | OUTPATIENT
Start: 2019-08-03 | End: 2019-08-03

## 2019-08-03 RX ADMIN — HYDROCODONE BITARTRATE AND ACETAMINOPHEN 1 TABLET: 5; 325 TABLET ORAL at 17:29

## 2019-08-03 ASSESSMENT — PAIN SCALES - GENERAL
PAINLEVEL_OUTOF10: 9
PAINLEVEL_OUTOF10: 9

## 2019-08-03 ASSESSMENT — PAIN DESCRIPTION - LOCATION: LOCATION: FOOT

## 2019-08-03 ASSESSMENT — PAIN DESCRIPTION - PAIN TYPE: TYPE: ACUTE PAIN

## 2019-08-03 ASSESSMENT — PAIN DESCRIPTION - ORIENTATION: ORIENTATION: RIGHT

## 2019-08-03 NOTE — ED NOTES
Pt insisted on foot xray ONLY.   Just released from [facility]     James Guaman, TOBIAS  08/03/19 3833

## 2019-08-03 NOTE — ED PROVIDER NOTES
[facility]dependent [facility]    HPI:  8/3/19,   Time: 3:29 PM         Faye Albrecht is a 70 y.o. female presenting to the ED from home by squad and complains of worsening right foot pain that began two days ago after she hit it. She states she was unable to go up her stairs from her basement and had to call ems. The complaint has been persistent, mild in severity, and worsened by walking. ROS:   Pertinent positives and negatives are stated within HPI, all other systems reviewed and are negative.  --------------------------------------------- PAST HISTORY ---------------------------------------------  Past Medical History:  has a past medical history of Arthritis, Chronic knee pain, Depression, Diabetes mellitus ([facility] [address] Gall stones, Hypertension, Kidney disease, Kidney stones, Obesity, Sarcoidosis, SOBOE (shortness of breath on exertion), Tremor, and Urinary anomaly. Past Surgical History:  has a past surgical history that includes Foot surgery (2009 ); Cystocopy (2011 );  section (x3); Upper gastrointestinal endoscopy (2.18.15); and Cholecystectomy (3/31/16). Social History:  reports that she quit smoking about 7 years ago. She has a 30.00 pack-year smoking history. She has never used smokeless tobacco. She reports that she does not drink alcohol or use drugs. Family History: family history includes Cancer in her sister. The patients home medications have been reviewed. Allergies: Patient has no known allergies. -------------------------------------------------- RESULTS -------------------------------------------------  All laboratory and radiology results have been personally reviewed by myself   LABS:  No results found for this visit on 19. RADIOLOGY:  Interpreted by Radiologist.  XR FOOT RIGHT (MIN 3 VIEWS)   Final Result   No acute osseous findings.    Peripheral vascular disease.          ------------------------- NURSING NOTES AND VITALS REVIEWED ---------------------------   The

## 2019-08-12 ENCOUNTER — HOSPITAL ENCOUNTER (INPATIENT)
Age: 69
LOS: 1 days | Discharge: HOME | DRG: 554 | End: 2019-08-14
Attending: EMERGENCY MEDICINE | Admitting: FAMILY MEDICINE
Payer: MEDICARE

## 2019-08-12 ENCOUNTER — APPOINTMENT (OUTPATIENT)
Dept: GENERAL RADIOLOGY | Age: 69
DRG: 554 | End: 2019-08-12
Payer: MEDICARE

## 2019-08-12 ENCOUNTER — APPOINTMENT (OUTPATIENT)
Dept: CT IMAGING | Age: 69
DRG: 554 | End: 2019-08-12
Payer: MEDICARE

## 2019-08-12 DIAGNOSIS — R26.2 INABILITY TO WALK: ICD-10-CM

## 2019-08-12 DIAGNOSIS — N18.9 CHRONIC KIDNEY DISEASE, UNSPECIFIED CKD STAGE: ICD-10-CM

## 2019-08-12 DIAGNOSIS — D64.9 ANEMIA, UNSPECIFIED TYPE: ICD-10-CM

## 2019-08-12 DIAGNOSIS — M19.90 ARTHRITIS: ICD-10-CM

## 2019-08-12 DIAGNOSIS — M25.561 PAIN IN BOTH KNEES, UNSPECIFIED CHRONICITY: Primary | ICD-10-CM

## 2019-08-12 DIAGNOSIS — M25.562 PAIN IN BOTH KNEES, UNSPECIFIED CHRONICITY: Primary | ICD-10-CM

## 2019-08-12 LAB
ALBUMIN SERPL-MCNC: 3.4 G/DL (ref 3.5–5.2)
ALP BLD-CCNC: 163 U/L (ref 35–104)
ALT SERPL-CCNC: 30 U/L (ref 0–32)
ANION GAP SERPL CALCULATED.3IONS-SCNC: 8 MMOL/L (ref 7–16)
AST SERPL-CCNC: 46 U/L (ref 0–31)
BACTERIA: ABNORMAL /HPF
BASOPHILS ABSOLUTE: 0.07 E9/L (ref 0–0.2)
BASOPHILS RELATIVE PERCENT: 0.9 % (ref 0–2)
BILIRUB SERPL-MCNC: 0.3 MG/DL (ref 0–1.2)
BILIRUBIN URINE: NEGATIVE
BLOOD, URINE: ABNORMAL
BUN BLDV-MCNC: 27 MG/DL (ref 8–23)
CALCIUM SERPL-MCNC: 9 MG/DL (ref 8.6–10.2)
CHLORIDE BLD-SCNC: 113 MMOL/L (ref 98–107)
CLARITY: CLEAR
CO2: 22 MMOL/L (ref 22–29)
COLOR: YELLOW
CREAT SERPL-MCNC: 3.1 MG/DL (ref 0.5–1)
EOSINOPHILS ABSOLUTE: 0.25 E9/L (ref 0.05–0.5)
EOSINOPHILS RELATIVE PERCENT: 3.2 % (ref 0–6)
EPITHELIAL CELLS, UA: ABNORMAL /HPF
GFR AFRICAN AMERICAN: 18
GFR NON-AFRICAN AMERICAN: 15 ML/MIN/1.73
GLUCOSE BLD-MCNC: 207 MG/DL (ref 74–99)
GLUCOSE URINE: 100 MG/DL
HCT VFR BLD CALC: 35.3 % (ref 34–48)
HEMOGLOBIN: 10.9 G/DL (ref 11.5–15.5)
IMMATURE GRANULOCYTES #: 0.03 E9/L
IMMATURE GRANULOCYTES %: 0.4 % (ref 0–5)
KETONES, URINE: NEGATIVE MG/DL
LACTIC ACID: 1 MMOL/L (ref 0.5–2.2)
LEUKOCYTE ESTERASE, URINE: NEGATIVE
LYMPHOCYTES ABSOLUTE: 0.98 E9/L (ref 1.5–4)
LYMPHOCYTES RELATIVE PERCENT: 12.5 % (ref 20–42)
MCH RBC QN AUTO: 30.5 PG (ref 26–35)
MCHC RBC AUTO-ENTMCNC: 30.9 % (ref 32–34.5)
MCV RBC AUTO: 98.9 FL (ref 80–99.9)
MONOCYTES ABSOLUTE: 0.6 E9/L (ref 0.1–0.95)
MONOCYTES RELATIVE PERCENT: 7.6 % (ref 2–12)
NEUTROPHILS ABSOLUTE: 5.92 E9/L (ref 1.8–7.3)
NEUTROPHILS RELATIVE PERCENT: 75.4 % (ref 43–80)
NITRITE, URINE: NEGATIVE
PDW BLD-RTO: 14.5 FL (ref 11.5–15)
PH UA: 7 (ref 5–9)
PLATELET # BLD: 270 E9/L (ref 130–450)
PMV BLD AUTO: 11.1 FL (ref 7–12)
POTASSIUM REFLEX MAGNESIUM: 6.4 MMOL/L (ref 3.5–5)
POTASSIUM SERPL-SCNC: 4.8 MMOL/L (ref 3.5–5)
PROTEIN UA: 100 MG/DL
RBC # BLD: 3.57 E12/L (ref 3.5–5.5)
RBC UA: ABNORMAL /HPF (ref 0–2)
SODIUM BLD-SCNC: 143 MMOL/L (ref 132–146)
SPECIFIC GRAVITY UA: 1.02 (ref 1–1.03)
TOTAL PROTEIN: 6.8 G/DL (ref 6.4–8.3)
TROPONIN: 0.04 NG/ML (ref 0–0.03)
UROBILINOGEN, URINE: 0.2 E.U./DL
WBC # BLD: 7.9 E9/L (ref 4.5–11.5)
WBC UA: ABNORMAL /HPF (ref 0–5)

## 2019-08-12 PROCEDURE — 81001 URINALYSIS AUTO W/SCOPE: CPT

## 2019-08-12 PROCEDURE — 84484 ASSAY OF TROPONIN QUANT: CPT

## 2019-08-12 PROCEDURE — 2580000003 HC RX 258: Performed by: NURSE PRACTITIONER

## 2019-08-12 PROCEDURE — 84132 ASSAY OF SERUM POTASSIUM: CPT

## 2019-08-12 PROCEDURE — 99285 EMERGENCY DEPT VISIT HI MDM: CPT

## 2019-08-12 PROCEDURE — 93005 ELECTROCARDIOGRAM TRACING: CPT | Performed by: NURSE PRACTITIONER

## 2019-08-12 PROCEDURE — 36415 COLL VENOUS BLD VENIPUNCTURE: CPT

## 2019-08-12 PROCEDURE — 80053 COMPREHEN METABOLIC PANEL: CPT

## 2019-08-12 PROCEDURE — 6370000000 HC RX 637 (ALT 250 FOR IP): Performed by: NURSE PRACTITIONER

## 2019-08-12 PROCEDURE — 73562 X-RAY EXAM OF KNEE 3: CPT

## 2019-08-12 PROCEDURE — 83605 ASSAY OF LACTIC ACID: CPT

## 2019-08-12 PROCEDURE — 70450 CT HEAD/BRAIN W/O DYE: CPT

## 2019-08-12 PROCEDURE — 85025 COMPLETE CBC W/AUTO DIFF WBC: CPT

## 2019-08-12 RX ORDER — 0.9 % SODIUM CHLORIDE 0.9 %
500 INTRAVENOUS SOLUTION INTRAVENOUS ONCE
Status: COMPLETED | OUTPATIENT
Start: 2019-08-12 | End: 2019-08-12

## 2019-08-12 RX ORDER — HYDROCODONE BITARTRATE AND ACETAMINOPHEN 5; 325 MG/1; MG/1
1 TABLET ORAL ONCE
Status: COMPLETED | OUTPATIENT
Start: 2019-08-12 | End: 2019-08-12

## 2019-08-12 RX ADMIN — HYDROCODONE BITARTRATE AND ACETAMINOPHEN 1 TABLET: 5; 325 TABLET ORAL at 22:17

## 2019-08-12 RX ADMIN — SODIUM CHLORIDE 500 ML: 9 INJECTION, SOLUTION INTRAVENOUS at 20:00

## 2019-08-12 ASSESSMENT — PAIN SCALES - GENERAL
PAINLEVEL_OUTOF10: 10
PAINLEVEL_OUTOF10: 7

## 2019-08-12 ASSESSMENT — PAIN DESCRIPTION - LOCATION: LOCATION: KNEE

## 2019-08-12 ASSESSMENT — PAIN DESCRIPTION - PAIN TYPE: TYPE: ACUTE PAIN

## 2019-08-12 ASSESSMENT — PAIN DESCRIPTION - ORIENTATION: ORIENTATION: RIGHT;LEFT

## 2019-08-13 LAB
ALBUMIN SERPL-MCNC: 3.2 G/DL (ref 3.5–5.2)
ALP BLD-CCNC: 137 U/L (ref 35–104)
ALT SERPL-CCNC: 20 U/L (ref 0–32)
ANION GAP SERPL CALCULATED.3IONS-SCNC: 11 MMOL/L (ref 7–16)
AST SERPL-CCNC: 18 U/L (ref 0–31)
BASOPHILS ABSOLUTE: 0.05 E9/L (ref 0–0.2)
BASOPHILS RELATIVE PERCENT: 0.7 % (ref 0–2)
BILIRUB SERPL-MCNC: 0.2 MG/DL (ref 0–1.2)
BUN BLDV-MCNC: 30 MG/DL (ref 8–23)
C-REACTIVE PROTEIN: 0.7 MG/DL (ref 0–0.4)
CALCIUM SERPL-MCNC: 8.7 MG/DL (ref 8.6–10.2)
CHLORIDE BLD-SCNC: 115 MMOL/L (ref 98–107)
CO2: 19 MMOL/L (ref 22–29)
CREAT SERPL-MCNC: 3 MG/DL (ref 0.5–1)
EKG ATRIAL RATE: 81 BPM
EKG P AXIS: 49 DEGREES
EKG P-R INTERVAL: 210 MS
EKG Q-T INTERVAL: 434 MS
EKG QRS DURATION: 132 MS
EKG QTC CALCULATION (BAZETT): 504 MS
EKG R AXIS: -16 DEGREES
EKG T AXIS: 91 DEGREES
EKG VENTRICULAR RATE: 81 BPM
EOSINOPHILS ABSOLUTE: 0.23 E9/L (ref 0.05–0.5)
EOSINOPHILS RELATIVE PERCENT: 3.4 % (ref 0–6)
GFR AFRICAN AMERICAN: 19
GFR NON-AFRICAN AMERICAN: 15 ML/MIN/1.73
GLUCOSE BLD-MCNC: 168 MG/DL (ref 74–99)
HCT VFR BLD CALC: 32.7 % (ref 34–48)
HEMOGLOBIN: 9.8 G/DL (ref 11.5–15.5)
IMMATURE GRANULOCYTES #: 0.02 E9/L
IMMATURE GRANULOCYTES %: 0.3 % (ref 0–5)
LYMPHOCYTES ABSOLUTE: 1.46 E9/L (ref 1.5–4)
LYMPHOCYTES RELATIVE PERCENT: 21.6 % (ref 20–42)
MCH RBC QN AUTO: 29.8 PG (ref 26–35)
MCHC RBC AUTO-ENTMCNC: 30 % (ref 32–34.5)
MCV RBC AUTO: 99.4 FL (ref 80–99.9)
METER GLUCOSE: 181 MG/DL (ref 74–99)
MONOCYTES ABSOLUTE: 0.83 E9/L (ref 0.1–0.95)
MONOCYTES RELATIVE PERCENT: 12.3 % (ref 2–12)
NEUTROPHILS ABSOLUTE: 4.17 E9/L (ref 1.8–7.3)
NEUTROPHILS RELATIVE PERCENT: 61.7 % (ref 43–80)
PDW BLD-RTO: 14.6 FL (ref 11.5–15)
PLATELET # BLD: 222 E9/L (ref 130–450)
PMV BLD AUTO: 10.9 FL (ref 7–12)
POTASSIUM REFLEX MAGNESIUM: 4.5 MMOL/L (ref 3.5–5)
RBC # BLD: 3.29 E12/L (ref 3.5–5.5)
SEDIMENTATION RATE, ERYTHROCYTE: 45 MM/HR (ref 0–20)
SODIUM BLD-SCNC: 145 MMOL/L (ref 132–146)
TOTAL PROTEIN: 5.8 G/DL (ref 6.4–8.3)
WBC # BLD: 6.8 E9/L (ref 4.5–11.5)

## 2019-08-13 PROCEDURE — 1200000000 HC SEMI PRIVATE

## 2019-08-13 PROCEDURE — 6370000000 HC RX 637 (ALT 250 FOR IP): Performed by: FAMILY MEDICINE

## 2019-08-13 PROCEDURE — 85651 RBC SED RATE NONAUTOMATED: CPT

## 2019-08-13 PROCEDURE — 85025 COMPLETE CBC W/AUTO DIFF WBC: CPT

## 2019-08-13 PROCEDURE — 97165 OT EVAL LOW COMPLEX 30 MIN: CPT

## 2019-08-13 PROCEDURE — G0378 HOSPITAL OBSERVATION PER HR: HCPCS

## 2019-08-13 PROCEDURE — 97161 PT EVAL LOW COMPLEX 20 MIN: CPT | Performed by: PHYSICAL THERAPIST

## 2019-08-13 PROCEDURE — 97530 THERAPEUTIC ACTIVITIES: CPT | Performed by: PHYSICAL THERAPIST

## 2019-08-13 PROCEDURE — 97530 THERAPEUTIC ACTIVITIES: CPT

## 2019-08-13 PROCEDURE — 93010 ELECTROCARDIOGRAM REPORT: CPT | Performed by: INTERNAL MEDICINE

## 2019-08-13 PROCEDURE — 80053 COMPREHEN METABOLIC PANEL: CPT

## 2019-08-13 PROCEDURE — 2580000003 HC RX 258: Performed by: FAMILY MEDICINE

## 2019-08-13 PROCEDURE — 36415 COLL VENOUS BLD VENIPUNCTURE: CPT

## 2019-08-13 PROCEDURE — 82962 GLUCOSE BLOOD TEST: CPT

## 2019-08-13 PROCEDURE — 86140 C-REACTIVE PROTEIN: CPT

## 2019-08-13 RX ORDER — BUPIVACAINE HYDROCHLORIDE 2.5 MG/ML
10 INJECTION, SOLUTION EPIDURAL; INFILTRATION; INTRACAUDAL ONCE
Status: DISCONTINUED | OUTPATIENT
Start: 2019-08-13 | End: 2019-08-14 | Stop reason: HOSPADM

## 2019-08-13 RX ORDER — OXYCODONE HYDROCHLORIDE AND ACETAMINOPHEN 5; 325 MG/1; MG/1
1 TABLET ORAL EVERY 6 HOURS PRN
Status: DISCONTINUED | OUTPATIENT
Start: 2019-08-13 | End: 2019-08-14 | Stop reason: HOSPADM

## 2019-08-13 RX ORDER — AMLODIPINE BESYLATE 10 MG/1
10 TABLET ORAL DAILY
Status: DISCONTINUED | OUTPATIENT
Start: 2019-08-13 | End: 2019-08-14 | Stop reason: HOSPADM

## 2019-08-13 RX ORDER — TRIAMCINOLONE ACETONIDE 40 MG/ML
40 INJECTION, SUSPENSION INTRA-ARTICULAR; INTRAMUSCULAR ONCE
Status: DISCONTINUED | OUTPATIENT
Start: 2019-08-13 | End: 2019-08-14 | Stop reason: HOSPADM

## 2019-08-13 RX ORDER — CALCITRIOL 0.25 UG/1
0.25 CAPSULE, LIQUID FILLED ORAL DAILY
Status: DISCONTINUED | OUTPATIENT
Start: 2019-08-13 | End: 2019-08-14 | Stop reason: HOSPADM

## 2019-08-13 RX ORDER — ONDANSETRON 2 MG/ML
4 INJECTION INTRAMUSCULAR; INTRAVENOUS EVERY 6 HOURS PRN
Status: DISCONTINUED | OUTPATIENT
Start: 2019-08-13 | End: 2019-08-14 | Stop reason: HOSPADM

## 2019-08-13 RX ORDER — SODIUM CHLORIDE 0.9 % (FLUSH) 0.9 %
10 SYRINGE (ML) INJECTION PRN
Status: DISCONTINUED | OUTPATIENT
Start: 2019-08-13 | End: 2019-08-14 | Stop reason: HOSPADM

## 2019-08-13 RX ORDER — LIDOCAINE HYDROCHLORIDE 20 MG/ML
10 INJECTION, SOLUTION EPIDURAL; INFILTRATION; INTRACAUDAL; PERINEURAL SEE ADMIN INSTRUCTIONS
Status: DISCONTINUED | OUTPATIENT
Start: 2019-08-13 | End: 2019-08-13 | Stop reason: SDUPTHER

## 2019-08-13 RX ORDER — SODIUM CHLORIDE 0.9 % (FLUSH) 0.9 %
10 SYRINGE (ML) INJECTION EVERY 12 HOURS SCHEDULED
Status: DISCONTINUED | OUTPATIENT
Start: 2019-08-13 | End: 2019-08-14 | Stop reason: HOSPADM

## 2019-08-13 RX ORDER — WARFARIN SODIUM 2 MG/1
2 TABLET ORAL EVERY EVENING
Status: DISCONTINUED | OUTPATIENT
Start: 2019-08-13 | End: 2019-08-14 | Stop reason: HOSPADM

## 2019-08-13 RX ORDER — TRIAMCINOLONE ACETONIDE 40 MG/ML
40 INJECTION, SUSPENSION INTRA-ARTICULAR; INTRAMUSCULAR ONCE
Status: DISCONTINUED | OUTPATIENT
Start: 2019-08-13 | End: 2019-08-13 | Stop reason: SDUPTHER

## 2019-08-13 RX ORDER — GABAPENTIN 300 MG/1
300 CAPSULE ORAL 3 TIMES DAILY
Status: DISCONTINUED | OUTPATIENT
Start: 2019-08-13 | End: 2019-08-14 | Stop reason: HOSPADM

## 2019-08-13 RX ORDER — HYDRALAZINE HYDROCHLORIDE 50 MG/1
50 TABLET, FILM COATED ORAL 3 TIMES DAILY
Status: DISCONTINUED | OUTPATIENT
Start: 2019-08-13 | End: 2019-08-14 | Stop reason: HOSPADM

## 2019-08-13 RX ORDER — LIDOCAINE HYDROCHLORIDE 20 MG/ML
2 INJECTION, SOLUTION INFILTRATION; PERINEURAL SEE ADMIN INSTRUCTIONS
Status: DISCONTINUED | OUTPATIENT
Start: 2019-08-13 | End: 2019-08-14 | Stop reason: HOSPADM

## 2019-08-13 RX ADMIN — WARFARIN SODIUM 2 MG: 2 TABLET ORAL at 01:28

## 2019-08-13 RX ADMIN — GABAPENTIN 300 MG: 300 CAPSULE ORAL at 20:06

## 2019-08-13 RX ADMIN — GABAPENTIN 300 MG: 300 CAPSULE ORAL at 14:05

## 2019-08-13 RX ADMIN — HYDRALAZINE HYDROCHLORIDE 50 MG: 50 TABLET, FILM COATED ORAL at 08:55

## 2019-08-13 RX ADMIN — HYDRALAZINE HYDROCHLORIDE 50 MG: 50 TABLET, FILM COATED ORAL at 20:06

## 2019-08-13 RX ADMIN — AMLODIPINE BESYLATE 10 MG: 10 TABLET ORAL at 08:55

## 2019-08-13 RX ADMIN — WARFARIN SODIUM 2 MG: 2 TABLET ORAL at 16:55

## 2019-08-13 RX ADMIN — HYDRALAZINE HYDROCHLORIDE 50 MG: 50 TABLET, FILM COATED ORAL at 14:05

## 2019-08-13 RX ADMIN — Medication 10 ML: at 20:12

## 2019-08-13 RX ADMIN — CALCITRIOL 0.25 MCG: 0.25 CAPSULE ORAL at 08:55

## 2019-08-13 RX ADMIN — GABAPENTIN 300 MG: 300 CAPSULE ORAL at 08:55

## 2019-08-13 RX ADMIN — Medication 10 ML: at 08:56

## 2019-08-13 RX ADMIN — OXYCODONE HYDROCHLORIDE AND ACETAMINOPHEN 1 TABLET: 5; 325 TABLET ORAL at 00:57

## 2019-08-13 ASSESSMENT — PAIN DESCRIPTION - LOCATION: LOCATION: KNEE

## 2019-08-13 ASSESSMENT — PAIN DESCRIPTION - PROGRESSION: CLINICAL_PROGRESSION: NOT CHANGED

## 2019-08-13 ASSESSMENT — PAIN SCALES - GENERAL
PAINLEVEL_OUTOF10: 0
PAINLEVEL_OUTOF10: 6
PAINLEVEL_OUTOF10: 0

## 2019-08-13 ASSESSMENT — PAIN - FUNCTIONAL ASSESSMENT: PAIN_FUNCTIONAL_ASSESSMENT: PREVENTS OR INTERFERES WITH ALL ACTIVE AND SOME PASSIVE ACTIVITIES

## 2019-08-13 ASSESSMENT — PAIN DESCRIPTION - ONSET: ONSET: ON-GOING

## 2019-08-13 ASSESSMENT — PAIN DESCRIPTION - DESCRIPTORS: DESCRIPTORS: DISCOMFORT;ACHING;SORE

## 2019-08-13 ASSESSMENT — PAIN DESCRIPTION - PAIN TYPE: TYPE: CHRONIC PAIN

## 2019-08-13 ASSESSMENT — PAIN DESCRIPTION - ORIENTATION: ORIENTATION: RIGHT;LEFT

## 2019-08-13 NOTE — ED PROVIDER NOTES
Imaging: All Radiology results interpreted by Radiologist unless otherwise noted. CT Head WO Contrast   Final Result   Mild atrophy. XR KNEE LEFT (3 VIEWS)   Final Result      1. Limited examination due to overlying soft tissue summation. 2. No obvious displaced fracture or dislocation. XR KNEE RIGHT (3 VIEWS)   Final Result      1. Limited due to overlying soft tissue summation. 2. No jocelyn fracture or evidence of dislocation. 3. Tricompartmental osteoarthritis. ED Course / Medical Decision Making     Medications   0.9 % sodium chloride bolus (0 mLs Intravenous Stopped 8/12/19 2305)   HYDROcodone-acetaminophen (NORCO) 5-325 MG per tablet 1 tablet (1 tablet Oral Given 8/12/19 2217)   EKG #1:  Interpreted by emergency department physician unless otherwise noted. Time:  2208    Rate: 81  Rhythm: Sinus. Interpretation: 1st degree AV block. Nonspecific T wave abnormality  EKG reviwed with Dr Chio Atkins and compared to EKG from 7/18/2019     Re-examination:  8/12/19       Time: 2200: attempted to stand patient, she is unable to bear her own weight. 2330: spoke with Dr Lenin Tom and he accepts admission    Consult(s):   IP CONSULT TO INTERNAL MEDICINE    Procedure(s):   none    MDM:   Zunilda Harrison is a 70year old female who presents to the ED foe complaint of inability to walk due to bilateral knee pain for the past 4 days. She appears very unkept with some dried feces on her lower extremities. She reports living alone. But does state that her daughter lives in the area. No family was present during the course of her stay in the ED. CBC no leukocytosis, H&H 10.9/35. 3. CMP: BUN 27/creatinine 3.1 which is improved from her baseline. She has a history of renal disease and is under the care of Dr. Regi Koo. Lactic acid 1.0. Troponin 0.04, which is also at her baseline due to her chronic renal disease. EKG sinus rhythm with first-degree AV block.   Denies chest pain. CT of her head showed mild atrophy. Urinalysis negative for UTI. Attempted to stand and ambulate patient. She was unable to bear her own weight. Discussed with patient admission for possible placement for rehabilitation. She agrees with plan. Counseling: The emergency provider has spoken with the patient and discussed todays results, in addition to providing specific details for the plan of care and counseling regarding the diagnosis and prognosis. Questions are answered at this time and they are agreeable with the plan. Assessment      1. Pain in both knees, unspecified chronicity    2. Inability to walk    3. Anemia, unspecified type    4. Chronic kidney disease, unspecified CKD stage      Plan   Admit to med/surg floor  Patient condition is stable    New Medications     New Prescriptions    No medications on file     Electronically signed by PABLO Miranda CNP   DD: 8/12/19  **This report was transcribed using voice recognition software. Every effort was made to ensure accuracy; however, inadvertent computerized transcription errors may be present.   END OF ED PROVIDER NOTE      PABLO Miranda CNP  08/12/19 4822

## 2019-08-13 NOTE — CONSULTS
Department of Orthopedic Surgery  Resident Consult Note          Reason for Consult:  Bilateral knee pain    HISTORY OF PRESENT ILLNESS:       Patient is a 70 y.o. female who presents with bilateral knee pain and difficulty ambulating. She states this has been a chronic issues for years. She states this is at her baseline. She uses a walker for ambulation. She is a diabetic with recent blood sugar in 200. She is on anticoagulants. She is obese and has not been taking care of herself. No numbness or tingling. Denies trauma or falls. She is a poor historian. Denies any other orthopedic complaints at this time.       Past Medical History:        Diagnosis Date    Arthritis     knees    Chronic knee pain     Depression     Diabetes mellitus (HCC)     type 2    Gall stones     Hypertension     Kidney disease     Kidney stones     Obesity     Sarcoidosis     SOBOE 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     Past Surgical History:        Procedure Laterality Date     SECTION  x3    CHOLECYSTECTOMY  3/31/16    Laparoscopic-Dr. Wilma Francis    CYSTOSCOPY       for kidney stones    FOOT SURGERY       right     UPPER GASTROINTESTINAL ENDOSCOPY  2.18.15    Dr. Maryse Smith, [facility] Findings: Mild Gastrits and Duodenitis, 2cm Hiatal Hernia     Current Medications:   Current Facility-Administered Medications: amLODIPine (NORVASC) tablet 10 mg, 10 mg, Oral, Daily  calcitRIOL (ROCALTROL) capsule 0.25 mcg, 0.25 mcg, Oral, Daily  gabapentin (NEURONTIN) capsule 300 mg, 300 mg, Oral, TID  hydrALAZINE (APRESOLINE) tablet 50 mg, 50 mg, Oral, TID  oxyCODONE-acetaminophen (PERCOCET) 5-325 MG per tablet 1 tablet, 1 tablet, Oral, Q6H PRN  warfarin (COUMADIN) tablet 2 mg, 2 mg, Oral, QPM  sodium chloride flush 0.9 % injection 10 mL, 10 mL, Intravenous, 2 times per day  sodium chloride flush 0.9 % injection 10 mL, 10 mL, Intravenous, PRN  magnesium hydroxide (MILK OF MAGNESIA) 400 MG/5ML suspension 30 mL, 30 mL, Oral, Daily PRN  ondansetron (ZOFRAN) injection 4 mg, 4 mg, Intravenous, Q6H PRN  bupivacaine (PF) (MARCAINE) 0.25 % injection 25 mg, 10 mL, Intradermal, Once  triamcinolone acetonide (KENALOG-40) injection 40 mg, 40 mg, Intramuscular, Once  lidocaine 2 % injection 2 mL, 2 mL, Intra-articular, See Admin Instructions  Allergies:  Patient has no known allergies. Social History:   TOBACCO:   reports that she quit smoking about 8 years ago. She has a 30.00 pack-year smoking history. She has never used smokeless tobacco.  ETOH:   reports that she does not drink alcohol. DRUGS:   reports that she does not use drugs. ACTIVITIES OF DAILY LIVING:    OCCUPATION:    Family History:       Problem Relation Age of Onset    Cancer Sister        REVIEW OF SYSTEMS:  CONSTITUTIONAL:  negative for  fevers, chills  RESPIRATORY:  negative for  dyspnea, wheezing  CARDIOVASCULAR:  negative for  chest pain, palpitations  GASTROINTESTINAL:  negative for nausea, vomiting  GENITOURINARY:  negative for frequency, urinary incontinence  HEMATOLOGIC/LYMPHATIC:  negative for bleeding and petechiae  MUSCULOSKELETAL:  positive for  pain  NEUROLOGICAL:  negative for headaches, dizziness  BEHAVIOR/PSYCH:  negative for increased agitation and anxiety    PHYSICAL EXAM:    VITALS:  BP (!) 149/84   Pulse 75   Temp 98.1 °F (36.7 °C)   Resp 16   Ht 5' 2\" (1.575 m)   Wt 255 lb (115.7 kg)   SpO2 96%   BMI 46.64 kg/m²   CONSTITUTIONAL:  awake, alert, cooperative, no apparent distress, and appears older than stated age, morbid obesity   MUSCULOSKELETAL:  Bilateral lower Extremity:  Skin is intact circumferentially  +TTP medial and lateral joint lines  +straight leg raise  Pain with ROM.  Limited passive and active ROM secondary to pain  Unable to appreciate effusion secondary to body habitus   Compartments soft and compressible  Palpable dorsalis pedis and posterior tibialis pulse, brisk cap refill to toes, foot warm and perfused  Sensation intact to light touch in sural/deep peroneal/superficial peroneal/saphenous/posterior tibial nerve distributions to foot/ankle  Demonstrates active ankle plantar/dorsiflexion/great toe extension    DATA:    CBC:   Lab Results   Component Value Date    WBC 7.9 08/12/2019    RBC 3.57 08/12/2019    HGB 10.9 08/12/2019    HCT 35.3 08/12/2019    MCV 98.9 08/12/2019    MCH 30.5 08/12/2019    MCHC 30.9 08/12/2019    RDW 14.5 08/12/2019     08/12/2019    MPV 11.1 08/12/2019     PT/INR:    Lab Results   Component Value Date    PROTIME 14.2 07/20/2019    PROTIME 15.3 06/25/2011    INR 1.2 07/20/2019       Radiology Review:  XR bilateral knees  Severe DJD bilateral knees with joint space narrowing. Large amount of soft tissue covering knee joint    IMPRESSION:  · Bilateral knee arthritis    PLAN:  · WBAT  · PT/OT  · Recommend weight loss and conditioning  · Will hold off on Corticosteroid injection as likely wont give much benefit and is a diabetic on anticoagulants. Would recommend weight loss and conditioning before injection.   · Discuss with Dr. Miquel Goodell- covering for Dr. Ronny Keith

## 2019-08-14 VITALS
SYSTOLIC BLOOD PRESSURE: 150 MMHG | DIASTOLIC BLOOD PRESSURE: 65 MMHG | RESPIRATION RATE: 16 BRPM | HEIGHT: 62 IN | OXYGEN SATURATION: 98 % | BODY MASS INDEX: 47.48 KG/M2 | TEMPERATURE: 99.2 F | HEART RATE: 67 BPM | WEIGHT: 258 LBS

## 2019-08-14 PROBLEM — I26.99 ACUTE PULMONARY EMBOLISM (HCC): Status: RESOLVED | Noted: 2019-04-29 | Resolved: 2019-08-14

## 2019-08-14 PROBLEM — J20.6 ACUTE BRONCHITIS DUE TO RHINOVIRUS: Status: RESOLVED | Noted: 2019-06-02 | Resolved: 2019-08-14

## 2019-08-14 PROBLEM — J15.9 PNEUMONIA, BACTERIAL: Status: RESOLVED | Noted: 2019-04-28 | Resolved: 2019-08-14

## 2019-08-14 PROBLEM — A09 DIARRHEA OF INFECTIOUS ORIGIN: Status: RESOLVED | Noted: 2019-06-02 | Resolved: 2019-08-14

## 2019-08-14 PROBLEM — A08.39 ENTEROVIRUS ENTERITIS: Status: RESOLVED | Noted: 2019-06-02 | Resolved: 2019-08-14

## 2019-08-14 PROBLEM — N39.0 UTI (URINARY TRACT INFECTION): Status: RESOLVED | Noted: 2019-07-19 | Resolved: 2019-08-14

## 2019-08-14 LAB
INR BLD: 1.6
PROTHROMBIN TIME: 18.4 SEC (ref 9.3–12.4)

## 2019-08-14 PROCEDURE — G0378 HOSPITAL OBSERVATION PER HR: HCPCS

## 2019-08-14 PROCEDURE — 36415 COLL VENOUS BLD VENIPUNCTURE: CPT

## 2019-08-14 PROCEDURE — 2580000003 HC RX 258: Performed by: FAMILY MEDICINE

## 2019-08-14 PROCEDURE — 6370000000 HC RX 637 (ALT 250 FOR IP): Performed by: FAMILY MEDICINE

## 2019-08-14 PROCEDURE — 85610 PROTHROMBIN TIME: CPT

## 2019-08-14 RX ORDER — OXYCODONE HYDROCHLORIDE AND ACETAMINOPHEN 5; 325 MG/1; MG/1
1 TABLET ORAL EVERY 6 HOURS PRN
Qty: 28 TABLET | Refills: 0 | Status: SHIPPED | DISCHARGE
Start: 2019-08-14 | End: 2019-08-21

## 2019-08-14 RX ADMIN — HYDRALAZINE HYDROCHLORIDE 50 MG: 50 TABLET, FILM COATED ORAL at 09:00

## 2019-08-14 RX ADMIN — CALCITRIOL 0.25 MCG: 0.25 CAPSULE ORAL at 09:00

## 2019-08-14 RX ADMIN — Medication 10 ML: at 09:01

## 2019-08-14 RX ADMIN — GABAPENTIN 300 MG: 300 CAPSULE ORAL at 09:00

## 2019-08-14 RX ADMIN — AMLODIPINE BESYLATE 10 MG: 10 TABLET ORAL at 09:00

## 2019-08-14 ASSESSMENT — PAIN SCALES - GENERAL
PAINLEVEL_OUTOF10: 0

## 2019-08-14 NOTE — DISCHARGE INSTR - COC
Continuity of Care Form    Patient Name: Chang Eden   :  1950  MRN:  [id number]    Admit date:  2019  Discharge date:  ***    Code Status Order: Full Code   Advance Directives:   Advance Care Flowsheet Documentation     Date/Time Healthcare Directive Type of Healthcare Directive Copy in [address] Agent's Name Healthcare Agent's Phone Number    19 0044  No, patient does not have an advance directive for healthcare treatment -- -- -- -- --          Admitting Physician:  Linus De La Garza MD  PCP: Linus De La Garza MD    Discharging Nurse: Christina Yu Unit/Room#: 4289/8933-U  Discharging Unit Phone Number: [phone]    Emergency Contact:   Extended Emergency Contact Information  Primary Emergency Contact: Johnson Mcguire   [address] Phone: [phone]  Relation: Child  Secondary Emergency Contact: Siva Mcguireie   [address] Phone: [phone]  Relation: Child    Past Surgical History:  Past Surgical History:   Procedure Laterality Date     SECTION  x3    CHOLECYSTECTOMY  3/31/16    Laparoscopic-Dr. Lorna Aiken    CYSTOSCOPY       for kidney stones    FOOT SURGERY       right     UPPER GASTROINTESTINAL ENDOSCOPY  2.18.15    Dr. Larisa Montez Findings: Mild Gastrits and Duodenitis, 2cm Hiatal Hernia       Immunization History: There is no immunization history for the selected administration types on file for this patient.     Active Problems:  Patient Active Problem List   Diagnosis Code    Neurologic gait dysfunction R26.9    Renal failure, acute on chronic (HCC) N17.9, N18.9    Diabetes mellitus ([address] E11.9    Hypertension I10    Arthritis M19.90    Kidney disease N28.9    Knee problem M25.9    Anemia due to stage 3 chronic kidney disease (HCC) N18.3, D63.1    Shortness of breath R06.02    PERLA (acute kidney injury) ([address] N17.9    Hypoglycemia associated with diabetes ([address] E11.649    Concerns:[id number]}    Impairments/Disabilities:      [address] Impairments/Disabilities:684658364}    Nutrition Therapy:  Current Nutrition Therapy:   [address] CIELO Diet List:125433602}    Routes of Feeding: {CHP DME Other Feedings:416321531}  Liquids: {Slp liquid thickness:22179}  Daily Fluid Restriction: {CHP DME Yes amt example:126585378}  Last Modified Barium Swallow with Video (Video Swallowing Test): {Done Not Done CVCE:552707188}    Treatments at the Time of Hospital Discharge:   Respiratory Treatments: ***  Oxygen Therapy:  {Therapy; copd oxygen:02150}  Ventilator:    {[facility] Vent YNSS:721418551}    Rehab Therapies: {THERAPEUTIC INTERVENTION:8710840965}  Weight Bearing Status/Restrictions: {[facility] Weight Bearin}  Other Medical Equipment (for information only, NOT a DME order):  {EQUIPMENT:919016695}  Other Treatments: ***    Patient's personal belongings (please select all that are sent with patient):  {[facility] DME Belongings:423392508}    RN SIGNATURE:  {Esignature:750987436}    CASE MANAGEMENT/SOCIAL WORK SECTION    Inpatient Status Date: ***    Readmission Risk Assessment Score:  Readmission Risk              Risk of Unplanned Readmission:        28           Discharging to Facility/ Agency   · Name:   · Address:  · Phone:  · Fax:    Dialysis Facility (if applicable)   · Name:  · Address:  · Dialysis Schedule:  · Phone:  · Fax:    / signature: {Esignature:[id number]}    PHYSICIAN SECTION    Prognosis: Good    Condition at Discharge: Stable    Rehab Potential (if transferring to Rehab): Good    Recommended Labs or Other Treatments After Discharge: ***    Physician Certification: I certify the above information and transfer of Clarice Collins  is necessary for the continuing treatment of the diagnosis listed and that she requires [facility] for less 30 days.      Update Admission H&P: No change in H&P    PHYSICIAN SIGNATURE:  Electronically signed by Darrell Reyes MD on

## 2019-08-14 NOTE — CARE COORDINATION
Discharge Order is in. Ambulette scheduled with [facility] for Noon . Notified [facility] and Charge Nurse.

## 2019-08-14 NOTE — PROGRESS NOTES
Nurse to nurse called to [facility]. Spoke with Sangeeta Schmitz. Medication list faxed to number provided as requested.
Patient and daughter notified of transport at 12 pm. Patient offered a bath dn refused. Stating she \"I will get washed up when she gets to the new place\". Will continue to monitor.
[facility]U  Physical Therapy Initial Evaluation    Name: Talat Osorio  : 1950  MRN: [id number]    Date of Service: 2019        Evaluating Therapist: Robert Velázquez PT, DPT       Equipment Recommendations: to be determined. Room #: 8201/8201-A  DIAGNOSIS: unable to ambulate   PRECAUTIONS: fall risk , bed alarm , WBAT     Social:  Pt lives alone in a 2 floor plan 1st floor setup, 3 steps and grab bar to enter. Prior to admission pt walked with w/w. Initial Evaluation  Date:  Treatment      Short Term/ Long Term   Goals   Was pt agreeable to Eval/treatment? yes     Does pt have pain? 10/10 knees with activity , 0/10 at rest per pt. Bed Mobility  Rolling: NT  Supine to sit: mod A   Sit to supine: max A   Scooting: max A x 2  Mod A    Transfers Sit to stand: mod A x 2  Stand to sit: mod A x 2  Stand pivot: NT  Mod A    Ambulation    2-3 sidesteps with standard walker with mod A x 2  50+ feet with w/w with mod A    Stair negotiation: ascended and descended   NT  Nt   AM-PAC Raw Score        Pt is alert and Oriented x 2  ROM:  L LE grossly WFL  R LE grossly WFL  Strength:   L LE grossly 3/5  R LE grossly 3/5  Balance: standing mod A x 2  Sensation: no c/o numbness/tingling. Edema: LE's   Endurance: poor  Bed alarm: reapplied end of evaluation. ASSESSMENT  Pt displays functional ability as noted in the objective portion of this evaluation. Comments/Treatment:  Pt found laying in bed agreeable to evaluation with encouragement. Pt instructed in mobility. In standing position, pt keeps B knees flexed. Pt left laying in bed with call button in reach and bed alarm reapplied end of evaluation. Patient education  Pt educated on mobility. Patient response to education:   Pt verbalized understanding Pt demonstrated skill Pt requires further education in this area     x     Rehab potential is Good for reaching above PT goals. Pts/ family goals   1. None stated.     Patient
Assist x2 with standard walker for steps toward [facility]  Modified [place]    Balance Sitting:     Static:  good    Dynamic:fair  Standing: fair-     Activity Tolerance Fair-, fatigues easily     Visual/  Perceptual Glasses: no                   Strength ROM Additional Info:    RUE  4-/5  WFL good  and wfl FMC/dexterity noted during ADL tasks     LUE 4-/5  WFL good  and wfl FMC/dexterity noted during ADL tasks         Hearing:  WFL   Sensation:  No c/o numbness or tingling   Tone:  WFL  Edema: yes BLE's                            Comments/Treatment: Upon arrival, patient supine in bed. OT eval completed. Therapist facilitated: Bed mobility, sitting balance at EOB for 7 minutes with supervision, transfer training with verbal cues for hand placement, static standing balance with Mod A x2, functional mobility with Mod A x2 with walker for 2-3 steps toward [facility], verbal cues for walker sequence and safety, pt returned to bed at end of eval. All needs within reach. Pt would benefit from continued skilled OT to increase safety and independence with completion of ADL/IADL tasks for functional independence and quality of life.     Eval Complexity: Low      Assessment of current deficits   Functional mobility [x]  ADLs [x] Strength [x]  Cognition []  Functional transfers  [x] IADLs [x] Safety Awareness [x]  Endurance [x]  Fine Motor Coordination [] Balance [x] Vision/perception [] Sensation []   Gross Motor Coordination [] ROM [] Delirium []                  Motor Control []    Plan of Care:   ADL retraining [x]   Equipment needs [x]   Neuromuscular re-education [x] Energy Conservation Techniques [x]  Functional Transfer training [x] Patient and/or Family Education [x]  Functional Mobility training [x]  Environmental Modifications [x]  Cognitive re-training []   Compensatory techniques for ADLs [x]  Splinting Needs []   Positioning to improve overall function [x]   Therapeutic Activity [x]  Therapeutic Exercise

## 2019-08-15 PROBLEM — M17.0 PRIMARY OSTEOARTHRITIS OF BOTH KNEES: Status: ACTIVE | Noted: 2019-08-15

## 2020-01-31 ENCOUNTER — HOSPITAL ENCOUNTER (INPATIENT)
Age: 70
LOS: 6 days | Discharge: HOME HEALTH | DRG: 683 | End: 2020-02-06
Attending: EMERGENCY MEDICINE | Admitting: FAMILY MEDICINE
Payer: MEDICARE

## 2020-01-31 ENCOUNTER — APPOINTMENT (OUTPATIENT)
Dept: GENERAL RADIOLOGY | Age: 70
DRG: 683 | End: 2020-01-31
Payer: MEDICARE

## 2020-01-31 ENCOUNTER — APPOINTMENT (OUTPATIENT)
Dept: CT IMAGING | Age: 70
DRG: 683 | End: 2020-01-31
Payer: MEDICARE

## 2020-01-31 PROBLEM — N28.9 ACUTE ON CHRONIC RENAL INSUFFICIENCY: Status: ACTIVE | Noted: 2020-01-31

## 2020-01-31 PROBLEM — N17.9 ACUTE RENAL FAILURE (HCC): Status: ACTIVE | Noted: 2020-01-31

## 2020-01-31 PROBLEM — N18.9 ACUTE ON CHRONIC RENAL INSUFFICIENCY: Status: ACTIVE | Noted: 2020-01-31

## 2020-01-31 LAB
ALBUMIN SERPL-MCNC: 2.7 G/DL (ref 3.5–5.2)
ALP BLD-CCNC: 99 U/L (ref 35–104)
ALT SERPL-CCNC: 8 U/L (ref 0–32)
ANION GAP SERPL CALCULATED.3IONS-SCNC: 12 MMOL/L (ref 7–16)
AST SERPL-CCNC: 12 U/L (ref 0–31)
BACTERIA: ABNORMAL /HPF
BASOPHILS ABSOLUTE: 0.04 E9/L (ref 0–0.2)
BASOPHILS RELATIVE PERCENT: 0.4 % (ref 0–2)
BILIRUB SERPL-MCNC: 0.2 MG/DL (ref 0–1.2)
BILIRUBIN URINE: NEGATIVE
BLOOD, URINE: ABNORMAL
BUN BLDV-MCNC: 56 MG/DL (ref 8–23)
CALCIUM SERPL-MCNC: 8.1 MG/DL (ref 8.6–10.2)
CHLORIDE BLD-SCNC: 114 MMOL/L (ref 98–107)
CLARITY: ABNORMAL
CO2: 16 MMOL/L (ref 22–29)
COLOR: YELLOW
CREAT SERPL-MCNC: 5.6 MG/DL (ref 0.5–1)
EOSINOPHILS ABSOLUTE: 0.19 E9/L (ref 0.05–0.5)
EOSINOPHILS RELATIVE PERCENT: 1.9 % (ref 0–6)
GFR AFRICAN AMERICAN: 9
GFR NON-AFRICAN AMERICAN: 7 ML/MIN/1.73
GLUCOSE BLD-MCNC: 169 MG/DL (ref 74–99)
GLUCOSE URINE: NEGATIVE MG/DL
HCT VFR BLD CALC: 32.7 % (ref 34–48)
HEMOGLOBIN: 9.6 G/DL (ref 11.5–15.5)
IMMATURE GRANULOCYTES #: 0.08 E9/L
IMMATURE GRANULOCYTES %: 0.8 % (ref 0–5)
KETONES, URINE: NEGATIVE MG/DL
LACTIC ACID: 0.9 MMOL/L (ref 0.5–2.2)
LEUKOCYTE ESTERASE, URINE: ABNORMAL
LIPASE: 14 U/L (ref 13–60)
LYMPHOCYTES ABSOLUTE: 0.87 E9/L (ref 1.5–4)
LYMPHOCYTES RELATIVE PERCENT: 8.5 % (ref 20–42)
MCH RBC QN AUTO: 28.7 PG (ref 26–35)
MCHC RBC AUTO-ENTMCNC: 29.4 % (ref 32–34.5)
MCV RBC AUTO: 97.6 FL (ref 80–99.9)
MONOCYTES ABSOLUTE: 0.92 E9/L (ref 0.1–0.95)
MONOCYTES RELATIVE PERCENT: 9 % (ref 2–12)
NEUTROPHILS ABSOLUTE: 8.11 E9/L (ref 1.8–7.3)
NEUTROPHILS RELATIVE PERCENT: 79.4 % (ref 43–80)
NITRITE, URINE: NEGATIVE
PDW BLD-RTO: 14.6 FL (ref 11.5–15)
PH UA: 5.5 (ref 5–9)
PLATELET # BLD: 144 E9/L (ref 130–450)
PMV BLD AUTO: 10.5 FL (ref 7–12)
POTASSIUM REFLEX MAGNESIUM: 5 MMOL/L (ref 3.5–5)
PRO-BNP: 7497 PG/ML (ref 0–125)
PROTEIN UA: >=300 MG/DL
RBC # BLD: 3.35 E12/L (ref 3.5–5.5)
RBC UA: ABNORMAL /HPF (ref 0–2)
SODIUM BLD-SCNC: 142 MMOL/L (ref 132–146)
SPECIFIC GRAVITY UA: >=1.03 (ref 1–1.03)
TOTAL PROTEIN: 5.8 G/DL (ref 6.4–8.3)
TROPONIN: 0.17 NG/ML (ref 0–0.03)
TROPONIN: 0.19 NG/ML (ref 0–0.03)
TROPONIN: 0.19 NG/ML (ref 0–0.03)
UROBILINOGEN, URINE: 0.2 E.U./DL
WBC # BLD: 10.2 E9/L (ref 4.5–11.5)
WBC UA: ABNORMAL /HPF (ref 0–5)

## 2020-01-31 PROCEDURE — 87186 SC STD MICRODIL/AGAR DIL: CPT

## 2020-01-31 PROCEDURE — 6360000002 HC RX W HCPCS: Performed by: EMERGENCY MEDICINE

## 2020-01-31 PROCEDURE — 99285 EMERGENCY DEPT VISIT HI MDM: CPT

## 2020-01-31 PROCEDURE — 6360000002 HC RX W HCPCS: Performed by: FAMILY MEDICINE

## 2020-01-31 PROCEDURE — 81001 URINALYSIS AUTO W/SCOPE: CPT

## 2020-01-31 PROCEDURE — 36415 COLL VENOUS BLD VENIPUNCTURE: CPT

## 2020-01-31 PROCEDURE — 83605 ASSAY OF LACTIC ACID: CPT

## 2020-01-31 PROCEDURE — 80053 COMPREHEN METABOLIC PANEL: CPT

## 2020-01-31 PROCEDURE — 87040 BLOOD CULTURE FOR BACTERIA: CPT

## 2020-01-31 PROCEDURE — 2580000003 HC RX 258: Performed by: EMERGENCY MEDICINE

## 2020-01-31 PROCEDURE — 93005 ELECTROCARDIOGRAM TRACING: CPT | Performed by: EMERGENCY MEDICINE

## 2020-01-31 PROCEDURE — 83690 ASSAY OF LIPASE: CPT

## 2020-01-31 PROCEDURE — 96365 THER/PROPH/DIAG IV INF INIT: CPT

## 2020-01-31 PROCEDURE — 83880 ASSAY OF NATRIURETIC PEPTIDE: CPT

## 2020-01-31 PROCEDURE — 6370000000 HC RX 637 (ALT 250 FOR IP): Performed by: FAMILY MEDICINE

## 2020-01-31 PROCEDURE — 85025 COMPLETE CBC W/AUTO DIFF WBC: CPT

## 2020-01-31 PROCEDURE — 1200000000 HC SEMI PRIVATE

## 2020-01-31 PROCEDURE — 71046 X-RAY EXAM CHEST 2 VIEWS: CPT

## 2020-01-31 PROCEDURE — 74176 CT ABD & PELVIS W/O CONTRAST: CPT

## 2020-01-31 PROCEDURE — 84484 ASSAY OF TROPONIN QUANT: CPT

## 2020-01-31 PROCEDURE — 72131 CT LUMBAR SPINE W/O DYE: CPT

## 2020-01-31 PROCEDURE — 72128 CT CHEST SPINE W/O DYE: CPT

## 2020-01-31 PROCEDURE — 87077 CULTURE AEROBIC IDENTIFY: CPT

## 2020-01-31 PROCEDURE — 87088 URINE BACTERIA CULTURE: CPT

## 2020-01-31 RX ORDER — CALCITRIOL 0.25 UG/1
0.25 CAPSULE, LIQUID FILLED ORAL DAILY
Status: DISCONTINUED | OUTPATIENT
Start: 2020-01-31 | End: 2020-02-06 | Stop reason: HOSPADM

## 2020-01-31 RX ORDER — AMLODIPINE BESYLATE 10 MG/1
10 TABLET ORAL DAILY
Status: DISCONTINUED | OUTPATIENT
Start: 2020-01-31 | End: 2020-02-06 | Stop reason: HOSPADM

## 2020-01-31 RX ORDER — ALPRAZOLAM 0.25 MG/1
0.25 TABLET ORAL NIGHTLY PRN
Status: ON HOLD | COMMUNITY
End: 2020-04-15 | Stop reason: SDUPTHER

## 2020-01-31 RX ORDER — SODIUM CHLORIDE 9 MG/ML
INJECTION, SOLUTION INTRAVENOUS CONTINUOUS
Status: DISCONTINUED | OUTPATIENT
Start: 2020-01-31 | End: 2020-02-05

## 2020-01-31 RX ORDER — HEPARIN SODIUM 10000 [USP'U]/ML
5000 INJECTION, SOLUTION INTRAVENOUS; SUBCUTANEOUS EVERY 8 HOURS
Status: DISCONTINUED | OUTPATIENT
Start: 2020-01-31 | End: 2020-02-06 | Stop reason: HOSPADM

## 2020-01-31 RX ORDER — HYDRALAZINE HYDROCHLORIDE 50 MG/1
50 TABLET, FILM COATED ORAL 3 TIMES DAILY
Status: DISCONTINUED | OUTPATIENT
Start: 2020-01-31 | End: 2020-02-06 | Stop reason: HOSPADM

## 2020-01-31 RX ORDER — OXYCODONE HYDROCHLORIDE AND ACETAMINOPHEN 5; 325 MG/1; MG/1
1 TABLET ORAL 2 TIMES DAILY PRN
Status: ON HOLD | COMMUNITY
End: 2020-04-15 | Stop reason: HOSPADM

## 2020-01-31 RX ORDER — GABAPENTIN 300 MG/1
300 CAPSULE ORAL 3 TIMES DAILY
Status: DISCONTINUED | OUTPATIENT
Start: 2020-01-31 | End: 2020-02-06 | Stop reason: HOSPADM

## 2020-01-31 RX ORDER — ONDANSETRON 2 MG/ML
4 INJECTION INTRAMUSCULAR; INTRAVENOUS EVERY 6 HOURS PRN
Status: DISCONTINUED | OUTPATIENT
Start: 2020-01-31 | End: 2020-02-06 | Stop reason: HOSPADM

## 2020-01-31 RX ORDER — 0.9 % SODIUM CHLORIDE 0.9 %
1000 INTRAVENOUS SOLUTION INTRAVENOUS ONCE
Status: COMPLETED | OUTPATIENT
Start: 2020-01-31 | End: 2020-01-31

## 2020-01-31 RX ORDER — ALPRAZOLAM 0.25 MG/1
0.25 TABLET ORAL NIGHTLY PRN
Status: DISCONTINUED | OUTPATIENT
Start: 2020-01-31 | End: 2020-02-06 | Stop reason: HOSPADM

## 2020-01-31 RX ORDER — OXYCODONE HYDROCHLORIDE AND ACETAMINOPHEN 5; 325 MG/1; MG/1
1 TABLET ORAL EVERY 6 HOURS PRN
Status: DISCONTINUED | OUTPATIENT
Start: 2020-01-31 | End: 2020-02-06 | Stop reason: HOSPADM

## 2020-01-31 RX ADMIN — HYDRALAZINE HYDROCHLORIDE 50 MG: 50 TABLET, FILM COATED ORAL at 21:13

## 2020-01-31 RX ADMIN — AMLODIPINE BESYLATE 10 MG: 10 TABLET ORAL at 18:23

## 2020-01-31 RX ADMIN — CEFTRIAXONE 2 G: 2 INJECTION, POWDER, FOR SOLUTION INTRAMUSCULAR; INTRAVENOUS at 12:37

## 2020-01-31 RX ADMIN — GABAPENTIN 300 MG: 300 CAPSULE ORAL at 21:13

## 2020-01-31 RX ADMIN — HYDRALAZINE HYDROCHLORIDE 50 MG: 50 TABLET, FILM COATED ORAL at 18:23

## 2020-01-31 RX ADMIN — SODIUM CHLORIDE 1000 ML: 9 INJECTION, SOLUTION INTRAVENOUS at 11:25

## 2020-01-31 RX ADMIN — HEPARIN SODIUM 5000 UNITS: 10000 INJECTION INTRAVENOUS; SUBCUTANEOUS at 18:24

## 2020-01-31 RX ADMIN — CALCITRIOL 0.25 MCG: 0.25 CAPSULE ORAL at 18:23

## 2020-01-31 RX ADMIN — GABAPENTIN 300 MG: 300 CAPSULE ORAL at 18:23

## 2020-01-31 ASSESSMENT — PAIN SCALES - GENERAL
PAINLEVEL_OUTOF10: 7
PAINLEVEL_OUTOF10: 0

## 2020-01-31 ASSESSMENT — PAIN DESCRIPTION - ORIENTATION: ORIENTATION: LOWER

## 2020-01-31 ASSESSMENT — PAIN DESCRIPTION - PAIN TYPE: TYPE: ACUTE PAIN

## 2020-01-31 ASSESSMENT — PAIN DESCRIPTION - LOCATION: LOCATION: BACK

## 2020-01-31 NOTE — CONSULTS
Nephrology Consult  [facility]  Navneet Gan MD    CC:   arf    HPI:   The pt is a 79 yo female with a pmh of CKD Stage 5 followed by Dr Gail Yuen of our practice . She presented to the er with urinary frequency, fatigue, and nasuea, and back pain. She was seen by us in the hospital in 7/2019 for arf on ckd in the setting of uti, hypoglycemia, lle cellulitis, and dehydration. Nick Barraza has a pmh of djd, nephrolithiasis, sarcoid,  depresison, htn, anemia of ckd, ckd 5. Her labs now show na 142 k 5 co2 16 bun 56 cr 5.6, gfr 9, ca 8.1, alb 2.7, wbc 10.2, hgb 9.6, plt 144. Her baseline cr is 3 from 8/2019 with a gfr of 15. She has been started on rocephin and ivf.  Imaging show bilateral nonobstructing stones.        PMH:    Past Medical History:   Diagnosis Date    Arthritis     knees    Chronic knee pain     Depression     Diabetes mellitus (HCC)     type 2    Gall stones     Hypertension     Kidney disease     Kidney stones     Obesity     Sarcoidosis     SOBOE 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       Patient Active Problem List   Diagnosis    Neurologic gait dysfunction    Renal failure, acute on chronic (HCC)    Diabetes mellitus ([address]    Hypertension    Arthritis    Kidney disease    Knee problem    Anemia due to stage 3 chronic kidney disease (HCC)    Shortness of breath    PERLA (acute kidney injury) ([address]    Hypoglycemia associated with diabetes (HCC)    Unable to ambulate    Primary osteoarthritis of both knees    Acute on chronic renal insufficiency    Acute renal failure (HCC)       Meds:     amLODIPine  10 mg Oral Daily    calcitRIOL  0.25 mcg Oral Daily    gabapentin  300 mg Oral TID    hydrALAZINE  50 mg Oral TID    heparin (porcine)  5,000 Units Subcutaneous Q8H    [START ON 2/1/2020] cefTRIAXone (ROCEPHIN) IV  1 g Intravenous Q24H        sodium chloride         Meds prn:     ALPRAZolam, oxyCODONE-acetaminophen, Neck: supple, no jvd  Cardiovascular: regular rate and rhythm, no murmurs, gallops, or rubs   Respiratory: Clear, no rales, rhochi, or wheezes,   Gastrointestinal: soft, nontender, nondistended, no hepatosplenomegaly  Ext: no edema  Neuro: aaox3  Skin: dry, no rash   Back: nontender    Data:    Recent Labs     01/31/20  1129   WBC 10.2   HGB 9.6*   HCT 32.7*   MCV 97.6          Recent Labs     01/31/20  1129      K 5.0   *   CO2 16*   CREATININE 5.6*   BUN 56*   LABGLOM 7   GLUCOSE 169*   CALCIUM 8.1*       Recent Labs     01/31/20  1129   ALT 8   AST 12   ALKPHOS 99   BILITOT 0.2       Lab Results   Component Value Date    FERRITIN 362 05/02/2019    IRON 120 05/02/2019    TIBC 246 (L) 05/02/2019       Lab Results   Component Value Date    MG 2.1 05/29/2019    PHOS 4.3 07/19/2019       Lab Results   Component Value Date    LABALBU 2.7 (L) 01/31/2020    LABALBU 3.2 (L) 08/13/2019    LABALBU 3.4 (L) 08/12/2019       Assessment and Plan:    1. Chronic kidney disease at stage 5 (recent baseline scr 3.1 - 3.4) w/ Hx sarcoid  Now above recent baseline in setting of mild dehydration and uti  She remains non oliguric w/ no uremic s/s  gentle fluids  follow strict I/Os and daily labs     2. UTI  started on Rocephin  cultures pending     3. Hyperkalemia  mild in setting of ckd 5  Has been on Valtessa in recent past   follow     4. Metabolic acidosis  Due to ckd  Start oral hoc3  Goal >20     5. Anemia of ckd  Check fe b12 fol heme  consider ARUN if HgB drops further      6.  HTN   follow on current meds  norvasc apresoline    7. secindary hyperparathryoidism  Follow on rocaltrol  Check pth       Granthaydee Lewiso.  Yordy Wallace MD

## 2020-01-31 NOTE — ED PROVIDER NOTES
(shortness of breath on exertion), Tremor, and Urinary anomaly. Past Surgical History:  has a past surgical history that includes Foot surgery (2009 ); Cystocopy (2011 );  section (x3); Upper gastrointestinal endoscopy (2.18.15); and Cholecystectomy (3/31/16). Social History:  reports that she quit smoking about 8 years ago. She has a 30.00 pack-year smoking history. She has never used smokeless tobacco. She reports that she does not drink alcohol or use drugs. Family History: family history includes Cancer in her sister. The patients home medications have been reviewed. Allergies: Patient has no known allergies.     -------------------------------------------------- RESULTS -------------------------------------------------  All laboratory and radiology results have been personally reviewed by myself   LABS:  Results for orders placed or performed during the hospital encounter of 20   CBC Auto Differential   Result Value Ref Range    WBC 10.2 4.5 - 11.5 E9/L    RBC 3.35 (L) 3.50 - 5.50 E12/L    Hemoglobin 9.6 (L) 11.5 - 15.5 g/dL    Hematocrit 32.7 (L) 34.0 - 48.0 %    MCV 97.6 80.0 - 99.9 fL    MCH 28.7 26.0 - 35.0 pg    MCHC 29.4 (L) 32.0 - 34.5 %    RDW 14.6 11.5 - 15.0 fL    Platelets 167 728 - 081 E9/L    MPV 10.5 7.0 - 12.0 fL    Neutrophils % 79.4 43.0 - 80.0 %    Immature Granulocytes % 0.8 0.0 - 5.0 %    Lymphocytes % 8.5 (L) 20.0 - 42.0 %    Monocytes % 9.0 2.0 - 12.0 %    Eosinophils % 1.9 0.0 - 6.0 %    Basophils % 0.4 0.0 - 2.0 %    Neutrophils Absolute 8.11 (H) 1.80 - 7.30 E9/L    Immature Granulocytes # 0.08 E9/L    Lymphocytes Absolute 0.87 (L) 1.50 - 4.00 E9/L    Monocytes Absolute 0.92 0.10 - 0.95 E9/L    Eosinophils Absolute 0.19 0.05 - 0.50 E9/L    Basophils Absolute 0.04 0.00 - 0.20 E9/L   Comprehensive Metabolic Panel w/ Reflex to MG   Result Value Ref Range    Sodium 142 132 - 146 mmol/L    Potassium reflex Magnesium 5.0 3.5 - 5.0 mmol/L    Chloride 114 (H) 98 - upper lumbar spine. No step-off. Mild bilateral paraspinal cervical tenderness to palpation as well. No significant CVA tenderness. Skin: warm and dry without rash  Neurologic: GCS 15, motor function and sensation intact throughout bilateral upper and lower extremities. Psych: Normal Affect      ------------------------------ ED COURSE/MEDICAL DECISION MAKING----------------------  Medications   0.9 % sodium chloride IV bolus 1,000 mL (0 mLs Intravenous Stopped 1/31/20 1505)   cefTRIAXone (ROCEPHIN) 2 g IVPB in D5W 50ml minibag (0 g Intravenous Stopped 1/31/20 1307)         ED COURSE:  ED Course as of Jan 31 1609 Fri Jan 31, 2020   1245 Patient is found to have a significant urinary tract infection as well as an acute on chronic kidney injury. Her baseline creatinine is around 3 and today it is 5.6. Patient given IV fluid hydration as well as Rocephin. Patient will require admission for further evaluation and management. Her PCP will be contacted. [BM]      ED Course User Index  [BM] Cheryl Maharaj DO       Medical Decision Making:    Patient presents with complaints of fatigue, nausea, back pain, and increased urinary frequency over the past few days. Patient's vital signs are stable and she is in no acute distress. She does appear obese and generally deconditioned. Her motor function is diminished in bilateral lower extremities but it is intact and equal.  Her sensation is intact and she has strong pulses in both feet. She does have point tenderness in her lower thoracic and upper lumbar midline spine and there is concern for compression fracture. She has no abdominal tenderness palpation and no chest pain, palpitations, shortness of breath. Patient may have a urinary tract infection due to her increased urinary frequency. Patient will have baseline blood work drawn as well as urinalysis and urine culture and a CT scan of the abdomen and pelvis as well as the lumbar and thoracic spine. Patient will be given IV fluid hydration and be kept on telemetry monitoring. Counseling: The emergency provider has spoken with the patient and discussed todays results, in addition to providing specific details for the plan of care and counseling regarding the diagnosis and prognosis. Questions are answered at this time and they are agreeable with the plan.      --------------------------------- IMPRESSION AND DISPOSITION ---------------------------------    IMPRESSION  1. Urinary tract infection without hematuria, site unspecified    2.  Acute renal failure superimposed on chronic kidney disease, unspecified CKD stage, unspecified acute renal failure type ([facility]        DISPOSITION  Disposition: Admit to med/surg floor  Patient condition is stable         Jazlyn Sethi DO  01/31/20 8878

## 2020-01-31 NOTE — ED NOTES
Bed: 07  Expected date:   Expected time:   Means of arrival:   Comments:  [address] [facility] [address], RN  01/31/20 0144

## 2020-02-01 PROBLEM — N30.00 ACUTE CYSTITIS WITHOUT HEMATURIA: Status: ACTIVE | Noted: 2020-02-01

## 2020-02-01 LAB
ALBUMIN SERPL-MCNC: 2.3 G/DL (ref 3.5–5.2)
ALP BLD-CCNC: 87 U/L (ref 35–104)
ALT SERPL-CCNC: 7 U/L (ref 0–32)
ANION GAP SERPL CALCULATED.3IONS-SCNC: 17 MMOL/L (ref 7–16)
AST SERPL-CCNC: 12 U/L (ref 0–31)
BASOPHILS ABSOLUTE: 0.04 E9/L (ref 0–0.2)
BASOPHILS RELATIVE PERCENT: 0.5 % (ref 0–2)
BILIRUB SERPL-MCNC: <0.2 MG/DL (ref 0–1.2)
BUN BLDV-MCNC: 58 MG/DL (ref 8–23)
CALCIUM SERPL-MCNC: 7.8 MG/DL (ref 8.6–10.2)
CHLORIDE BLD-SCNC: 114 MMOL/L (ref 98–107)
CO2: 12 MMOL/L (ref 22–29)
CREAT SERPL-MCNC: 5.6 MG/DL (ref 0.5–1)
EKG ATRIAL RATE: 89 BPM
EKG P-R INTERVAL: 228 MS
EKG Q-T INTERVAL: 398 MS
EKG QRS DURATION: 122 MS
EKG QTC CALCULATION (BAZETT): 484 MS
EKG R AXIS: -24 DEGREES
EKG T AXIS: 154 DEGREES
EKG VENTRICULAR RATE: 89 BPM
EOSINOPHILS ABSOLUTE: 0.26 E9/L (ref 0.05–0.5)
EOSINOPHILS RELATIVE PERCENT: 3.1 % (ref 0–6)
FERRITIN: 206 NG/ML
FOLATE: 4.6 NG/ML (ref 4.8–24.2)
GFR AFRICAN AMERICAN: 9
GFR NON-AFRICAN AMERICAN: 7 ML/MIN/1.73
GLUCOSE BLD-MCNC: 132 MG/DL (ref 74–99)
HCT VFR BLD CALC: 28.9 % (ref 34–48)
HEMOGLOBIN: 8.6 G/DL (ref 11.5–15.5)
IMMATURE GRANULOCYTES #: 0.07 E9/L
IMMATURE GRANULOCYTES %: 0.8 % (ref 0–5)
LYMPHOCYTES ABSOLUTE: 0.92 E9/L (ref 1.5–4)
LYMPHOCYTES RELATIVE PERCENT: 11.1 % (ref 20–42)
MCH RBC QN AUTO: 29.2 PG (ref 26–35)
MCHC RBC AUTO-ENTMCNC: 29.8 % (ref 32–34.5)
MCV RBC AUTO: 98 FL (ref 80–99.9)
MONOCYTES ABSOLUTE: 0.73 E9/L (ref 0.1–0.95)
MONOCYTES RELATIVE PERCENT: 8.8 % (ref 2–12)
NEUTROPHILS ABSOLUTE: 6.28 E9/L (ref 1.8–7.3)
NEUTROPHILS RELATIVE PERCENT: 75.7 % (ref 43–80)
PARATHYROID HORMONE INTACT: 336 PG/ML (ref 15–65)
PDW BLD-RTO: 14.6 FL (ref 11.5–15)
PHOSPHORUS: 6.4 MG/DL (ref 2.5–4.5)
PLATELET # BLD: 163 E9/L (ref 130–450)
PMV BLD AUTO: 11.2 FL (ref 7–12)
POTASSIUM REFLEX MAGNESIUM: 5.3 MMOL/L (ref 3.5–5)
RBC # BLD: 2.95 E12/L (ref 3.5–5.5)
SODIUM BLD-SCNC: 143 MMOL/L (ref 132–146)
TOTAL PROTEIN: 5.3 G/DL (ref 6.4–8.3)
TROPONIN: 0.19 NG/ML (ref 0–0.03)
VITAMIN B-12: 620 PG/ML (ref 211–946)
WBC # BLD: 8.3 E9/L (ref 4.5–11.5)

## 2020-02-01 PROCEDURE — 93010 ELECTROCARDIOGRAM REPORT: CPT | Performed by: INTERNAL MEDICINE

## 2020-02-01 PROCEDURE — APPSS180 APP SPLIT SHARED TIME > 60 MINUTES: Performed by: NURSE PRACTITIONER

## 2020-02-01 PROCEDURE — 6370000000 HC RX 637 (ALT 250 FOR IP): Performed by: INTERNAL MEDICINE

## 2020-02-01 PROCEDURE — 6370000000 HC RX 637 (ALT 250 FOR IP): Performed by: FAMILY MEDICINE

## 2020-02-01 PROCEDURE — 84484 ASSAY OF TROPONIN QUANT: CPT

## 2020-02-01 PROCEDURE — 6360000002 HC RX W HCPCS: Performed by: FAMILY MEDICINE

## 2020-02-01 PROCEDURE — 2580000003 HC RX 258: Performed by: FAMILY MEDICINE

## 2020-02-01 PROCEDURE — 99222 1ST HOSP IP/OBS MODERATE 55: CPT | Performed by: INTERNAL MEDICINE

## 2020-02-01 PROCEDURE — 36415 COLL VENOUS BLD VENIPUNCTURE: CPT

## 2020-02-01 PROCEDURE — 93005 ELECTROCARDIOGRAM TRACING: CPT | Performed by: NURSE PRACTITIONER

## 2020-02-01 PROCEDURE — 83970 ASSAY OF PARATHORMONE: CPT

## 2020-02-01 PROCEDURE — 1200000000 HC SEMI PRIVATE

## 2020-02-01 PROCEDURE — 85025 COMPLETE CBC W/AUTO DIFF WBC: CPT

## 2020-02-01 PROCEDURE — 80053 COMPREHEN METABOLIC PANEL: CPT

## 2020-02-01 PROCEDURE — 2580000003 HC RX 258

## 2020-02-01 PROCEDURE — 99223 1ST HOSP IP/OBS HIGH 75: CPT | Performed by: FAMILY MEDICINE

## 2020-02-01 PROCEDURE — 84100 ASSAY OF PHOSPHORUS: CPT

## 2020-02-01 PROCEDURE — 82607 VITAMIN B-12: CPT

## 2020-02-01 PROCEDURE — 82728 ASSAY OF FERRITIN: CPT

## 2020-02-01 PROCEDURE — 82746 ASSAY OF FOLIC ACID SERUM: CPT

## 2020-02-01 RX ORDER — SODIUM BICARBONATE 650 MG/1
650 TABLET ORAL 2 TIMES DAILY
Status: DISCONTINUED | OUTPATIENT
Start: 2020-02-01 | End: 2020-02-04

## 2020-02-01 RX ORDER — SODIUM CHLORIDE 0.9 % (FLUSH) 0.9 %
SYRINGE (ML) INJECTION
Status: COMPLETED
Start: 2020-02-01 | End: 2020-02-01

## 2020-02-01 RX ADMIN — SODIUM BICARBONATE 650 MG: 650 TABLET ORAL at 21:46

## 2020-02-01 RX ADMIN — OXYCODONE HYDROCHLORIDE AND ACETAMINOPHEN 1 TABLET: 5; 325 TABLET ORAL at 05:04

## 2020-02-01 RX ADMIN — GABAPENTIN 300 MG: 300 CAPSULE ORAL at 09:05

## 2020-02-01 RX ADMIN — ALPRAZOLAM 0.25 MG: 0.25 TABLET ORAL at 21:46

## 2020-02-01 RX ADMIN — AMLODIPINE BESYLATE 10 MG: 10 TABLET ORAL at 09:05

## 2020-02-01 RX ADMIN — HYDRALAZINE HYDROCHLORIDE 50 MG: 50 TABLET, FILM COATED ORAL at 09:06

## 2020-02-01 RX ADMIN — HYDRALAZINE HYDROCHLORIDE 50 MG: 50 TABLET, FILM COATED ORAL at 21:46

## 2020-02-01 RX ADMIN — HEPARIN SODIUM 5000 UNITS: 10000 INJECTION INTRAVENOUS; SUBCUTANEOUS at 09:05

## 2020-02-01 RX ADMIN — OXYCODONE HYDROCHLORIDE AND ACETAMINOPHEN 1 TABLET: 5; 325 TABLET ORAL at 16:35

## 2020-02-01 RX ADMIN — SODIUM CHLORIDE, PRESERVATIVE FREE 10 ML: 5 INJECTION INTRAVENOUS at 09:59

## 2020-02-01 RX ADMIN — SODIUM BICARBONATE 650 MG: 650 TABLET ORAL at 15:21

## 2020-02-01 RX ADMIN — ALPRAZOLAM 0.25 MG: 0.25 TABLET ORAL at 00:45

## 2020-02-01 RX ADMIN — CALCITRIOL 0.25 MCG: 0.25 CAPSULE ORAL at 09:05

## 2020-02-01 RX ADMIN — ONDANSETRON HYDROCHLORIDE 4 MG: 2 SOLUTION INTRAMUSCULAR; INTRAVENOUS at 19:13

## 2020-02-01 RX ADMIN — HYDRALAZINE HYDROCHLORIDE 50 MG: 50 TABLET, FILM COATED ORAL at 14:14

## 2020-02-01 RX ADMIN — GABAPENTIN 300 MG: 300 CAPSULE ORAL at 14:14

## 2020-02-01 RX ADMIN — GABAPENTIN 300 MG: 300 CAPSULE ORAL at 20:46

## 2020-02-01 RX ADMIN — CEFTRIAXONE SODIUM 1 G: 1 INJECTION, POWDER, FOR SOLUTION INTRAMUSCULAR; INTRAVENOUS at 10:06

## 2020-02-01 RX ADMIN — SODIUM CHLORIDE: 9 INJECTION, SOLUTION INTRAVENOUS at 10:00

## 2020-02-01 RX ADMIN — HEPARIN SODIUM 5000 UNITS: 10000 INJECTION INTRAVENOUS; SUBCUTANEOUS at 15:24

## 2020-02-01 ASSESSMENT — PAIN SCALES - GENERAL
PAINLEVEL_OUTOF10: 4
PAINLEVEL_OUTOF10: 0
PAINLEVEL_OUTOF10: 8
PAINLEVEL_OUTOF10: 8

## 2020-02-01 NOTE — PROGRESS NOTES
Temporal 80 16 96 %   01/31/20 1530 (!) 165/70 97.2 °F (36.2 °C) Temporal 80 16 98 %   01/31/20 1426 (!) 145/70 98 °F (36.7 °C) -- 82 14 95 %   @      Intake/Output Summary (Last 24 hours) at 2/1/2020 1344  Last data filed at 1/31/2020 2300  Gross per 24 hour   Intake 240 ml   Output --   Net 240 ml         Wt Readings from Last 3 Encounters:   01/31/20 258 lb (117 kg)   08/13/19 258 lb (117 kg)   08/03/19 255 lb (115.7 kg)       Constitutional:  Pt is in no acute distress  Head: normocephalic, atraumatic  Neck: no JVD  Cardiovascular: regular rate and rhythm, no murmurs, gallops, or rubs  Respiratory:  No rales, rhochi, or wheezes  Gastrointestinal:  Soft, nontender, nondistended, bowel sounds x 4  Ext: no edema  Skin: dry, no rash  Neuro: aaox3    MEDS (scheduled):    amLODIPine  10 mg Oral Daily    calcitRIOL  0.25 mcg Oral Daily    gabapentin  300 mg Oral TID    hydrALAZINE  50 mg Oral TID    heparin (porcine)  5,000 Units Subcutaneous Q8H    cefTRIAXone (ROCEPHIN) IV  1 g Intravenous Q24H       MEDS (infusions):   sodium chloride 75 mL/hr at 02/01/20 1000       MEDS (prn):  ALPRAZolam, oxyCODONE-acetaminophen, magnesium hydroxide, ondansetron    DATA:    Recent Labs     01/31/20  1129 02/01/20  1059   WBC 10.2 8.3   HGB 9.6* 8.6*   HCT 32.7* 28.9*   MCV 97.6 98.0    163     Recent Labs     01/31/20  1129 02/01/20  0517    143   K 5.0 5.3*   * 114*   CO2 16* 12*   BUN 56* 58*   CREATININE 5.6* 5.6*   LABGLOM 7 7   GLUCOSE 169* 132*   CALCIUM 8.1* 7.8*   ALT 8 7   AST 12 12   BILITOT 0.2 <0.2   ALKPHOS 99 87   PHOS  --  6.4*       Lab Results   Component Value Date    LABALBU 2.3 (L) 02/01/2020    LABALBU 2.7 (L) 01/31/2020    LABALBU 3.2 (L) 08/13/2019     Lab Results   Component Value Date    TSH 1.945 08/16/2013     No results found for: PHART, PO2ART, [id number]    Iron Studies:   Lab Results   Component Value Date    IRON 120 05/02/2019    TIBC 246 (L) 05/02/2019    FERRITIN 206 02/01/2020         IMPRESSION/RECOMMENDATIONS:      1. Chronic kidney disease at stage 5 (recent baseline scr 3.1 - 3.4) w/ Hx sarcoid  Now above recent baseline in setting of mild dehydration and uti  She remains non oliguric w/ no uremic s/s  gentle fluids  follow strict I/Os and daily labs     2. UTI  started on Rocephin  cultures pending     3. Hyperkalemia  mild in setting of ckd 5  Has been on Valtessa in recent past  follow     4. Metabolic acidosis  Due to ckd  Started oral hoc3  Goal >20     5. Anemia of ckd  Check fe b12 fol heme  Start lorie      6.  HTN   follow on current meds  norvasc apresoline     7. secondary hyperparathryoidism  Follow on rocaltrol  Check pth       Jose Juan De.  Yojana Daly MD

## 2020-02-01 NOTE — PLAN OF CARE
Problem: Urinary Elimination:  Goal: Signs and symptoms of infection will decrease  Description  Signs and symptoms of infection will decrease  Outcome: Met This Shift     Problem: Urinary Elimination:  Goal: Complications related to the disease process, condition or treatment will be avoided or minimized  Description  Complications related to the disease process, condition or treatment will be avoided or minimized  Outcome: Met This Shift

## 2020-02-01 NOTE — CONSULTS
I have personally seen and evaluated the patient. I personally obtained the history and performed the physical exam.  I personally reviewed all of the above labs, history, review of systems, and data. All of the assessments and recommendations are from me. All of the above cardiac medical decisions are from me. Please see my additional contributions to the history, physical exam, assessment, and recommendations below. History of chief complaint:  She presented with urinary frequency, urgency. Nausea and weakness. She has been found to have a urinary tract infection. She is also been found to be dehydrated with acute on chronic renal sufficiency. She denies any chest discomfort. She is morbidly obese. She is chronically easily winded but denies any breathing abnormalities. She also denies any lower extremity edema. I showed her her lower extremity venous stasis changes and or cellulitis changes and she states that that is chronic for her. Review of systems:     Heart: as above   Lungs: as above   Eyes: denies changes in vision or discharge. Ears: denies changes in hearing or pain. Nose: denies epistaxis or masses   Throat: denies sore throat or trouble swallowing. Neuro: denies numbness, tingling, tremors. Skin: denies rashes or itching. : denies hematuria, dysuria   GI: denies vomiting, diarrhea   Psych: denies mood changed, anxiety, depression. Physical exam:  BP (!) 173/72   Pulse 88   Temp 97.4 °F (36.3 °C) (Temporal)   Resp 16   Wt 258 lb (117 kg)   SpO2 96%   BMI 47.19 kg/m²   Constitutional: A&O x3, communicates well, no acute distress. Eyes: extraocular muscles intact, PERRL. Normal lids & conjunctiva. No icterus. ENT: clear, no bleeding. No external masses. Lips normal formation. Neck: supple, full ROM, no JVD, no bruits, no lymphadenopathy. No masses. trachea midline.   Heart: regular rate & rhythm, normal S1 & S2, I/VI (normal physiologic) systolic murmur, S4 gallop. No heave. Lungs: CTA. No accessory muscles. Abd: soft, non-tender. Normal bowel sounds. Morbidly obese. Neuro: Full ROM X 4, EOMI, no tremors. EXT: Venous stasis or cellulitis changes but mild bilateral lower extremity edema  Skin: warm, dry, intact. Good turgor. Psych: A&O x 3, normal behavior, not anxious. Patient seen and examined. Chart, labs & data reviewed. A:  1. Chronically elevated BNP with chronic renal insufficiency. Her BNP is more elevated this admission correlating with a significant increase in her creatinine. She denies any CHF symptoms. 2. Chronically elevated troponins again with acute on chronic renal insufficiency. She denies any ischemic symptoms. 3. Urinary tract infection  4. Dehydration. 5. Significant acute on chronic renal insufficiency. 6. Morbid obesity. 7. Chronic lower extremity cellulitis and/or venous stasis. 8. Diabetes. 9. Hypertension. 10. Pulmonary sarcoidosis. 11. Chronic left bundle branch block. 12. Left ventricular hypertrophy. Rec:  1. Echocardiogram done in April. No need to repeat. 2. Volume status and electrolytes per nephrology. 3. I will defer her comorbidities to others. 4. No further cardiac testing or recommendations. 5. Cardiology will sign off.     Electronically signed by Etienne Najera DO on 2/1/2020 at 5:15 PM

## 2020-02-01 NOTE — PLAN OF CARE
Problem: Urinary Elimination:  Goal: Signs and symptoms of infection will decrease  Description  Signs and symptoms of infection will decrease  1/31/2020 2145 by Tan Farmer RN  Outcome: Met This Shift  1/31/2020 1946 by Yue Block RN  Outcome: Met This Shift  Goal: Complications related to the disease process, condition or treatment will be avoided or minimized  Description  Complications related to the disease process, condition or treatment will be avoided or minimized  1/31/2020 2145 by Tan Farmer RN  Outcome: Met This Shift  1/31/2020 1946 by Yue Block RN  Outcome: Met This Shift

## 2020-02-01 NOTE — H&P
Francois Gold  1/31/2020  9:53 AM     1950      78 y.o. Chief Complaint   Patient presents with    Fatigue     With nausea, dizziness, and lower back pain that started 2 days ago. Pt denies any abdominal pain. History of Present Illness:    Francois Gold is a 78 y.o. with a history of chronic kidney disease, diabetes, hypertension and severe at this point non operable knee osteoarthritis, presenting to the ED for fatigue, back pain, nausea, and urinary frequency,. She states that she lives at home by herself and normally is able to get around but she has been feeling very rundown and has had issues going up steps    She had some vague complaints, decreased appetite, back pain, nausea. Denies chest pain , sob, diaphoresis, fall, injury. Glucose 160. Appears to have acute on chronic renal falure. Labs wbc 10.2, hgb 9.6  K+ 5.0  Bun/cr 56/5.6 ,   Baseline cr 3  Pro bnp 7497  Baseline 3000  Urine + blood and LE,  neg nit    Elevated troponin likely 2ndary to ckd    Today K+ 5.3,  Bun/cr 58/5.6    Prior to Admission medications    Medication Sig Start Date End Date Taking? Authorizing Provider   ALPRAZolam (XANAX) 0.25 MG tablet Take 0.25 mg by mouth nightly as needed for Sleep. Yes Historical Provider, MD   oxyCODONE-acetaminophen (PERCOCET) 5-325 MG per tablet Take 1 tablet by mouth 2 times daily as needed for Pain.    Yes Historical Provider, MD   hydrALAZINE (APRESOLINE) 50 MG tablet Take 1 tablet by mouth 3 times daily 5/3/19  Yes Brit Howell MD   amLODIPine (NORVASC) 10 MG tablet Take 10 mg by mouth daily   Yes Historical Provider, MD   calcitRIOL (ROCALTROL) 0.25 MCG capsule Take 1 capsule by mouth daily 4/2/16  Yes Kevin Hennessy DO   gabapentin (NEURONTIN) 300 MG capsule Take 1 capsule by mouth 3 times daily 4/26/15  Yes Historical Provider, MD       Current Facility-Administered Medications   Medication Dose Route Frequency Provider Last Rate Last Dose    ALPRAZolam Jettie Mickey) tablet 0.25 mg  0.25 mg Oral Nightly PRN Ari Rodriguez MD   0.25 mg at 20 0045    amLODIPine (NORVASC) tablet 10 mg  10 mg Oral Daily Ari Rodriguez MD   10 mg at 20 0313    calcitRIOL (ROCALTROL) capsule 0.25 mcg  0.25 mcg Oral Daily Ari Rodriguez MD   0.25 mcg at 20 3509    gabapentin (NEURONTIN) capsule 300 mg  300 mg Oral TID Ari Rodriguez MD   300 mg at 20 5198    hydrALAZINE (APRESOLINE) tablet 50 mg  50 mg Oral TID Ari Rodriguez MD   50 mg at 20 1274    oxyCODONE-acetaminophen (PERCOCET) 5-325 MG per tablet 1 tablet  1 tablet Oral Q6H PRN Ari Rodriguez MD   1 tablet at 20 0504    magnesium hydroxide (MILK OF MAGNESIA) 400 MG/5ML suspension 30 mL  30 mL Oral Daily PRN Ari Rodriguez MD        ondansetron [facility]) injection 4 mg  4 mg Intravenous Q6H PRN Ari Rodriguez MD        0.9 % sodium chloride infusion   Intravenous Continuous Ari Rodriguez MD        heparin (porcine) injection 5,000 Units  5,000 Units Subcutaneous Q8H Ari Rodriguez MD   5,000 Units at 20 6144    cefTRIAXone (ROCEPHIN) 1 g in sterile water 10 mL IV syringe  1 g Intravenous Q24H Ari Rodriguez MD         Continuous Infusions:   sodium chloride         No Known Allergies      Past Medical History:   Diagnosis Date    Arthritis     knees    Chronic knee pain     Depression     Diabetes mellitus ([facility]     type 2    Gall stones     Hypertension     Kidney disease     Kidney stones     Obesity     Sarcoidosis     SOBOE 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         Past Surgical History:   Procedure Laterality Date     SECTION  x3    CHOLECYSTECTOMY  3/31/16    Laparoscopic-Dr. Corin López    CYSTOSCOPY       for kidney stones    FOOT SURGERY  2009     right     UPPER GASTROINTESTINAL ENDOSCOPY  2.18.15    Dr. Teodora Hopkins Findings: Mild Gastrits and Duodenitis, 2cm Hiatal Hernia         Social History:   Social History     Socioeconomic History    Marital status:      Spouse name: None    Number of children: None    Years of education: None    Highest education level: None   Occupational History    None   Social Needs    Financial resource strain: None    Food insecurity:     Worry: None     Inability: None    Transportation needs:     Medical: None     Non-medical: None   Tobacco Use    Smoking status: Former Smoker     Packs/day: 1.00     Years: 30.00     Pack years: 30.00     Last attempt to quit: 2011     Years since quittin.4    Smokeless tobacco: Never Used   Substance and Sexual Activity    Alcohol use: No    Drug use: No    Sexual activity: Not Currently   Lifestyle    Physical activity:     Days per week: None     Minutes per session: None    Stress: None   Relationships    Social connections:     Talks on phone: None     Gets together: None     Attends [employer] service: None     Active member of club or organization: None     Attends meetings of clubs or organizations: None     Relationship status: None    Intimate partner violence:     Fear of current or ex partner: None     Emotionally abused: None     Physically abused: None     Forced sexual activity: None   Other Topics Concern    None   Social History Narrative    None       Family History:   Family History   Problem Relation Age of Onset    Cancer Sister             REVIEW OF SYSTEMS:                            Review of Systems   Constitutional: Positive for activity change, appetite change and fatigue. Negative for chills, diaphoresis, fever and unexpected weight change. HENT: Negative. Eyes: Negative. Respiratory: Negative for cough, choking, shortness of breath, wheezing and stridor. Cardiovascular: Positive for leg swelling. Chronic leg edema   Gastrointestinal: Positive for nausea and vomiting. Negative for abdominal distention, abdominal pain, constipation and diarrhea.    Genitourinary: Negative for difficulty urinating. Musculoskeletal: Positive for arthralgias, back pain and myalgias. Skin:        Lichenified skin on legs   Neurological: Positive for weakness and light-headedness. Negative for tremors, seizures, syncope, facial asymmetry, speech difficulty, numbness and headaches. Psychiatric/Behavioral: Negative for self-injury and suicidal ideas. DATA:      Recent Labs     01/31/20  1129   WBC 10.2   HGB 9.6*   HCT 32.7*   MCV 97.6        Recent Labs     01/31/20  1129 02/01/20  0517    143   K 5.0 5.3*   * 114*   CO2 16* 12*   BUN 56* 58*   CREATININE 5.6* 5.6*   LABGLOM 7 7   CALCIUM 8.1* 7.8*     Recent Labs     01/31/20  1129 02/01/20  0517   ALT 8 7     INR: No results for input(s): INR in the last 72 hours. Invalid input(s): PT/INR    Lab Results   Component Value Date    CRP 0.7 (H) 08/13/2019     Lab Results   Component Value Date    SEDRATE 45 (H) 08/13/2019     No results for input(s): POCGLU in the last 72 hours. Lipids: No results for input(s): CHOL, HDL in the last 72 hours. Invalid input(s): LDLCALCU  ABGs: No results found for: PHART, PO2ART, [id number]    Last 3 Troponin:    Lab Results   Component Value Date    TROPONINI 0.19 02/01/2020    TROPONINI 0.19 01/31/2020    TROPONINI 0.17 01/31/2020     TSH:    Lab Results   Component Value Date    TSH 1.945 08/16/2013        No results for input(s): POCGLU in the last 72 hours.     U/A:     Recent Labs     01/31/20  1150   COLORU Yellow   PHUR 5.5   WBCUA PACKED   RBCUA 10-20*   BACTERIA FEW*   CLARITYU TURBID*   SPECGRAV >=1.030   LEUKOCYTESUR MODERATE*   UROBILINOGEN 0.2   BILIRUBINUR Negative   BLOODU SMALL*   GLUCOSEU Negative          Iron studies:  Lab Results   Component Value Date    FERRITIN 206 02/01/2020     Bone disease:  Lab Results   Component Value Date     (H) 03/31/2016    MG 2.1 05/29/2019    PHOS 6.4 (H) 02/01/2020     Nutrition:No results found for: ALB    Wt Readings from Last 4 Encounters: culture  Rocephin  Soc serv lposible sart on discharge      Vinny Rich  9:09 AM  2/1/2020

## 2020-02-01 NOTE — CONSULTS
Inpatient Cardiology Consultation      Reason for Consult:  Elevated troponin, proBNP     Consulting Physician: Dr. Elysia Archer    Requesting Physician:  Dr. Angela Peck    Date of Consultation: 2/1/2020    HISTORY OF PRESENT ILLNESS:   Ms. Jose Elias GRANGER, [facility] is a 78year old female who is not previously known to [facility] cardiology physicians; patient was previously seen by Dr. Irasema Garcia in 2/2015 for cardiac risk assessment for gastric bypass surgery (denies undergoing surgery). Patient appears to be a limited historian; no family at the bedside. She presented to the ED on 1/31/2020 with complaints of vague low back pain, nausea with no emesis, urine frequency and feeling \"run down. \"  Patient reported having mid back pain for the last few days worse with positional changes (twisting from side to side). Patient does live by herself and has been feeling rundown right lately. She has been unable to ambulate up and down steps due to weakness. Upon arrival to the ED: proBNP 7497 (prior proBNP 3625), troponin 0.19, 0.17, 0.19, 0.19. Sodium 142, potassium 5.0, CO2 16, BUN 56, creatinine 5.6, lactic acid 0.9, albumin 2.7, WBC 10.2, hemoglobin 9.6, hematocrit 32.7, platelet count 520, UA: Moderate leukocytes, proteinuria, small amounts of blood. Urine culture positive for gram-negative rods. EKG: SR with 1st degree AV block, LBBB, rate 89 bpm. ER medications: Normal saline 75 cc/h continuous, normal saline IV bolus 1000 cc. Patient was admitted to a telemetry monitored unit. Renal was consulted for acute on chronic renal insufficiency. She was started on IV antibiotics along with IV fluids (for dehydration). Cardiology was asked to see the patient for elevated proBNP and elevated troponin. Patient is currently asymptomatic; denies chest pain and shortness of breath. Please note: past medical records were reviewed per electronic medical record (EMR) - see detailed reports under Past Medical/ Surgical History.    Past Medical History:    1. Arthritis  2. Hypertension  3. Diabetes mellitus --peripheral neuropathy (previously on metformin/discontinued for unknown reasons)  4. Depression  5. Obesity: Weight 258 pounds  6. History of \"pulmonary sarcoidosis\" per chart -- (further details are unknown  7. Essential tremor  8. TTE: 2019 EF 60%, mild LVH, indeterminate diastolic function, severe mitral annular calcification (posterior), trace MR, MV mean gradient 7.5 mmHg, mild TR. 9.  Denies prior cardiac catheterization or stress testing. 10. cLBBB  11. Former smoker: Quit over 12 years ago (0.5 PPD x15 years)  15. History of cholecystectomy, right foot surgery  13. History of EGD 2015 showing mild gastritis and duodenitis, 2 cm hiatal hernia. 14.  Anemia of chronic disease: Baseline hemoglobin ~ 9    Past Surgical History:    Past Surgical History:   Procedure Laterality Date     SECTION  x3    CHOLECYSTECTOMY  3/31/16    Laparoscopic-Dr. Mariola Domínguez    CYSTOSCOPY       for kidney stones    FOOT SURGERY       right     UPPER GASTROINTESTINAL ENDOSCOPY  2.18.15    Dr. Jesus Germain Findings: Mild Gastrits and Duodenitis, 2cm Hiatal Hernia       Medications Prior to admit:  Prior to Admission medications    Medication Sig Start Date End Date Taking? Authorizing Provider   ALPRAZolam (XANAX) 0.25 MG tablet Take 0.25 mg by mouth nightly as needed for Sleep. Yes Historical Provider, MD   oxyCODONE-acetaminophen (PERCOCET) 5-325 MG per tablet Take 1 tablet by mouth 2 times daily as needed for Pain.    Yes Historical Provider, MD   hydrALAZINE (APRESOLINE) 50 MG tablet Take 1 tablet by mouth 3 times daily 5/3/19  Yes Aaron Olivera MD   amLODIPine (NORVASC) 10 MG tablet Take 10 mg by mouth daily   Yes Historical Provider, MD   calcitRIOL (ROCALTROL) 0.25 MCG capsule Take 1 capsule by mouth daily 16  Yes Elvia Rodriguez DO   gabapentin (NEURONTIN) 300 MG capsule Take 1 capsule by mouth 3 times daily 4/26/15  Yes Historical Provider, MD       Current Medications:    Current Facility-Administered Medications: ALPRAZolam (XANAX) tablet 0.25 mg, 0.25 mg, Oral, Nightly PRN  amLODIPine (NORVASC) tablet 10 mg, 10 mg, Oral, Daily  calcitRIOL (ROCALTROL) capsule 0.25 mcg, 0.25 mcg, Oral, Daily  gabapentin (NEURONTIN) capsule 300 mg, 300 mg, Oral, TID  hydrALAZINE (APRESOLINE) tablet 50 mg, 50 mg, Oral, TID  oxyCODONE-acetaminophen (PERCOCET) 5-325 MG per tablet 1 tablet, 1 tablet, Oral, Q6H PRN  magnesium hydroxide (MILK OF MAGNESIA) 400 MG/5ML suspension 30 mL, 30 mL, Oral, Daily PRN  ondansetron (ZOFRAN) injection 4 mg, 4 mg, Intravenous, Q6H PRN  0.9 % sodium chloride infusion, , Intravenous, Continuous  heparin (porcine) injection 5,000 Units, 5,000 Units, Subcutaneous, Q8H  cefTRIAXone (ROCEPHIN) 1 g in sterile water 10 mL IV syringe, 1 g, Intravenous, Q24H    Allergies:  Patient has no known allergies. Social History:    Patient lives alone and uses a walker for ambulation. She continues to drive  Denies alcohol and illicit drug use  Former smoker: Quit over 12 years ago (0.5 PPD x15 years)    Family History: Denies family history of premature CAD    REVIEW OF SYSTEMS:     · Constitutional: + Fatigue. Denies fevers, chills or night sweats  · Eyes: Denies visual changes or drainage  · ENT: Denies headaches or hearing loss. No mouth sores or sore throat. No epistaxis   · Cardiovascular: Denies chest pain, pressure or palpitations. + lower extremity swelling. · Respiratory: + NAVARRO. Denies cough, orthopnea or PND. No hemoptysis   · Gastrointestinal: Denies hematemesis or anorexia. No hematochezia or melena    · Genitourinary: Denies urgency, dysuria or hematuria. · Musculoskeletal: Denies gait disturbance, + generalized weakness. Denies joint complaints  · Integumentary: Denies rash, hives or pruritis   · Neurological: Denies dizziness, headaches or seizures. No numbness or tingling  · Psychiatric: + Anxiety. 02/01/20  0517    143   K 5.0 5.3*   CO2 16* 12*   BUN 56* 58*   CREATININE 5.6* 5.6*   LABGLOM 7 7   CALCIUM 8.1* 7.8*     Mag: No results for input(s): MG in the last 72 hours. Phos:   Recent Labs     02/01/20  0517   PHOS 6.4*     TSH: No results for input(s): TSH in the last 72 hours. HgA1c:   Lab Results   Component Value Date    LABA1C 6.1 (H) 11/24/2018     No results found for: EAG  proBNP:   Recent Labs     01/31/20  1129   PROBNP 7,497*     PT/INR: No results for input(s): PROTIME, INR in the last 72 hours. APTT:No results for input(s): APTT in the last 72 hours. CARDIAC ENZYMES:  Recent Labs     01/31/20  2140 01/31/20  2322 02/01/20  0517   TROPONINI 0.17* 0.19* 0.19*     FASTING LIPID PANEL:  Lab Results   Component Value Date    CHOL 191 10/12/2011    HDL 37.0 10/12/2011    LDLCALC 118 10/12/2011    TRIG 181 10/12/2011     LIVER PROFILE:  Recent Labs     01/31/20  1129 02/01/20  0517   AST 12 12   ALT 8 7   LABALBU 2.7* 2.3*     chest x-ray: 1/31/2020:  No evidence of acute cardiopulmonary process. CT lumbar spine without contrast: 1/31/2020  Findings compatible with degenerative changes, nonobstructive bilateral renal calculi, bilateral renal masses, nonspecific on the study although cyst, likely cysts. CT thoracic spine: 1/31/2020  Degenerative changes    CT abdomen/pelvis: 1/31/2020  1. Bilateral nonobstructing nephrolithiasis. 2. Bilateral probable renal cysts. 3. Sizable fat-containing low anterior abdominal wall hernia.             Assessment/plan as per Dr. Ольга Heller:  Electronically signed by PABLO Perry Che, CNP on 2/1/20 at 5:27 PM                I have personally seen and evaluated the patient. I personally obtained the history and performed the physical exam.  I personally reviewed all of the above labs, history, review of systems, and data. All of the assessments and recommendations are from me. All of the above cardiac medical decisions are from me.   Please see my additional contributions to the history, physical exam, assessment, and recommendations below.        History of chief complaint:  She presented with urinary frequency, urgency. Nausea and weakness. She has been found to have a urinary tract infection. She is also been found to be dehydrated with acute on chronic renal sufficiency. She denies any chest discomfort. She is morbidly obese. She is chronically easily winded but denies any breathing abnormalities. She also denies any lower extremity edema. I showed her her lower extremity venous stasis changes and or cellulitis changes and she states that that is chronic for her.     Review of systems:                Heart: as above              Lungs: as above              Eyes: denies changes in vision or discharge. Ears: denies changes in hearing or pain. Nose: denies epistaxis or masses              Throat: denies sore throat or trouble swallowing. Neuro: denies numbness, tingling, tremors. Skin: denies rashes or itching. : denies hematuria, dysuria              GI: denies vomiting, diarrhea              Psych: denies mood changed, anxiety, depression.                   Physical exam:  BP (!) 173/72   Pulse 88   Temp 97.4 °F (36.3 °C) (Temporal)   Resp 16   Wt 258 lb (117 kg)   SpO2 96%   BMI 47.19 kg/m²   Constitutional: A&O x3, communicates well, no acute distress. Eyes: extraocular muscles intact, PERRL. Normal lids & conjunctiva. No icterus. ENT: clear, no bleeding. No external masses. Lips normal formation. Neck: supple, full ROM, no JVD, no bruits, no lymphadenopathy. No masses. trachea midline. Heart: regular rate & rhythm, normal S1 & S2, I/VI (normal physiologic) systolic murmur, S4 gallop. No heave. Lungs: CTA. No accessory muscles. Abd: soft, non-tender. Normal bowel sounds. Morbidly obese. Neuro: Full ROM X 4, EOMI, no tremors.   EXT: Venous stasis or

## 2020-02-02 PROBLEM — R77.8 ELEVATED TROPONIN: Status: ACTIVE | Noted: 2020-02-02

## 2020-02-02 PROBLEM — E87.5 HYPERKALEMIA: Status: ACTIVE | Noted: 2020-02-02

## 2020-02-02 PROBLEM — R79.89 ELEVATED TROPONIN: Status: ACTIVE | Noted: 2020-02-02

## 2020-02-02 PROBLEM — E87.20 METABOLIC ACIDOSIS: Status: ACTIVE | Noted: 2020-02-02

## 2020-02-02 LAB
ALBUMIN SERPL-MCNC: 2.1 G/DL (ref 3.5–5.2)
ALP BLD-CCNC: 82 U/L (ref 35–104)
ALT SERPL-CCNC: 7 U/L (ref 0–32)
ANION GAP SERPL CALCULATED.3IONS-SCNC: 14 MMOL/L (ref 7–16)
AST SERPL-CCNC: 10 U/L (ref 0–31)
BASOPHILS ABSOLUTE: 0.03 E9/L (ref 0–0.2)
BASOPHILS RELATIVE PERCENT: 0.3 % (ref 0–2)
BILIRUB SERPL-MCNC: <0.2 MG/DL (ref 0–1.2)
BUN BLDV-MCNC: 52 MG/DL (ref 8–23)
CALCIUM SERPL-MCNC: 7.9 MG/DL (ref 8.6–10.2)
CHLORIDE BLD-SCNC: 113 MMOL/L (ref 98–107)
CO2: 13 MMOL/L (ref 22–29)
CREAT SERPL-MCNC: 5.1 MG/DL (ref 0.5–1)
EKG ATRIAL RATE: 83 BPM
EKG P AXIS: 32 DEGREES
EKG P-R INTERVAL: 228 MS
EKG Q-T INTERVAL: 436 MS
EKG QRS DURATION: 126 MS
EKG QTC CALCULATION (BAZETT): 512 MS
EKG R AXIS: -23 DEGREES
EKG T AXIS: 111 DEGREES
EKG VENTRICULAR RATE: 83 BPM
EOSINOPHILS ABSOLUTE: 0.28 E9/L (ref 0.05–0.5)
EOSINOPHILS RELATIVE PERCENT: 2.9 % (ref 0–6)
GFR AFRICAN AMERICAN: 10
GFR NON-AFRICAN AMERICAN: 8 ML/MIN/1.73
GLUCOSE BLD-MCNC: 229 MG/DL (ref 74–99)
HCT VFR BLD CALC: 30.4 % (ref 34–48)
HEMOGLOBIN: 8.9 G/DL (ref 11.5–15.5)
IMMATURE GRANULOCYTES #: 0.11 E9/L
IMMATURE GRANULOCYTES %: 1.2 % (ref 0–5)
LYMPHOCYTES ABSOLUTE: 0.93 E9/L (ref 1.5–4)
LYMPHOCYTES RELATIVE PERCENT: 9.8 % (ref 20–42)
MCH RBC QN AUTO: 29 PG (ref 26–35)
MCHC RBC AUTO-ENTMCNC: 29.3 % (ref 32–34.5)
MCV RBC AUTO: 99 FL (ref 80–99.9)
MONOCYTES ABSOLUTE: 0.91 E9/L (ref 0.1–0.95)
MONOCYTES RELATIVE PERCENT: 9.5 % (ref 2–12)
NEUTROPHILS ABSOLUTE: 7.27 E9/L (ref 1.8–7.3)
NEUTROPHILS RELATIVE PERCENT: 76.3 % (ref 43–80)
ORGANISM: ABNORMAL
PDW BLD-RTO: 14.6 FL (ref 11.5–15)
PLATELET # BLD: 197 E9/L (ref 130–450)
PMV BLD AUTO: 10.6 FL (ref 7–12)
POTASSIUM SERPL-SCNC: 4.7 MMOL/L (ref 3.5–5)
RBC # BLD: 3.07 E12/L (ref 3.5–5.5)
SODIUM BLD-SCNC: 140 MMOL/L (ref 132–146)
TOTAL PROTEIN: 5.3 G/DL (ref 6.4–8.3)
URINE CULTURE, ROUTINE: ABNORMAL
URINE CULTURE, ROUTINE: ABNORMAL
WBC # BLD: 9.5 E9/L (ref 4.5–11.5)

## 2020-02-02 PROCEDURE — 6370000000 HC RX 637 (ALT 250 FOR IP): Performed by: INTERNAL MEDICINE

## 2020-02-02 PROCEDURE — 6360000002 HC RX W HCPCS: Performed by: FAMILY MEDICINE

## 2020-02-02 PROCEDURE — 36415 COLL VENOUS BLD VENIPUNCTURE: CPT

## 2020-02-02 PROCEDURE — 80053 COMPREHEN METABOLIC PANEL: CPT

## 2020-02-02 PROCEDURE — 6370000000 HC RX 637 (ALT 250 FOR IP): Performed by: FAMILY MEDICINE

## 2020-02-02 PROCEDURE — 2580000003 HC RX 258: Performed by: FAMILY MEDICINE

## 2020-02-02 PROCEDURE — 93010 ELECTROCARDIOGRAM REPORT: CPT | Performed by: INTERNAL MEDICINE

## 2020-02-02 PROCEDURE — 1200000000 HC SEMI PRIVATE

## 2020-02-02 PROCEDURE — 85025 COMPLETE CBC W/AUTO DIFF WBC: CPT

## 2020-02-02 PROCEDURE — 99232 SBSQ HOSP IP/OBS MODERATE 35: CPT | Performed by: FAMILY MEDICINE

## 2020-02-02 RX ADMIN — SODIUM CHLORIDE: 9 INJECTION, SOLUTION INTRAVENOUS at 23:16

## 2020-02-02 RX ADMIN — CALCIUM ACETATE 1334 MG: 667 CAPSULE ORAL at 15:03

## 2020-02-02 RX ADMIN — HYDRALAZINE HYDROCHLORIDE 50 MG: 50 TABLET, FILM COATED ORAL at 20:49

## 2020-02-02 RX ADMIN — CEFTRIAXONE SODIUM 1 G: 1 INJECTION, POWDER, FOR SOLUTION INTRAMUSCULAR; INTRAVENOUS at 08:28

## 2020-02-02 RX ADMIN — AMLODIPINE BESYLATE 10 MG: 10 TABLET ORAL at 08:27

## 2020-02-02 RX ADMIN — CALCITRIOL 0.25 MCG: 0.25 CAPSULE ORAL at 08:27

## 2020-02-02 RX ADMIN — CALCIUM ACETATE 1334 MG: 667 CAPSULE ORAL at 19:12

## 2020-02-02 RX ADMIN — SODIUM BICARBONATE 650 MG: 650 TABLET ORAL at 20:49

## 2020-02-02 RX ADMIN — GABAPENTIN 300 MG: 300 CAPSULE ORAL at 15:01

## 2020-02-02 RX ADMIN — SODIUM BICARBONATE 650 MG: 650 TABLET ORAL at 08:28

## 2020-02-02 RX ADMIN — GABAPENTIN 300 MG: 300 CAPSULE ORAL at 08:28

## 2020-02-02 RX ADMIN — HEPARIN SODIUM 5000 UNITS: 10000 INJECTION INTRAVENOUS; SUBCUTANEOUS at 23:17

## 2020-02-02 RX ADMIN — OXYCODONE HYDROCHLORIDE AND ACETAMINOPHEN 1 TABLET: 5; 325 TABLET ORAL at 08:29

## 2020-02-02 RX ADMIN — GABAPENTIN 300 MG: 300 CAPSULE ORAL at 20:49

## 2020-02-02 RX ADMIN — HYDRALAZINE HYDROCHLORIDE 50 MG: 50 TABLET, FILM COATED ORAL at 08:28

## 2020-02-02 RX ADMIN — HEPARIN SODIUM 5000 UNITS: 10000 INJECTION INTRAVENOUS; SUBCUTANEOUS at 08:29

## 2020-02-02 RX ADMIN — HYDRALAZINE HYDROCHLORIDE 50 MG: 50 TABLET, FILM COATED ORAL at 15:03

## 2020-02-02 RX ADMIN — HEPARIN SODIUM 5000 UNITS: 10000 INJECTION INTRAVENOUS; SUBCUTANEOUS at 16:35

## 2020-02-02 ASSESSMENT — ENCOUNTER SYMPTOMS
DIARRHEA: 0
ABDOMINAL DISTENTION: 0
SHORTNESS OF BREATH: 0
EYES NEGATIVE: 1
ABDOMINAL PAIN: 0
CHOKING: 0
WHEEZING: 0
BACK PAIN: 1
STRIDOR: 0
CONSTIPATION: 0
VOMITING: 1
COUGH: 0
NAUSEA: 1

## 2020-02-02 ASSESSMENT — PAIN DESCRIPTION - PROGRESSION: CLINICAL_PROGRESSION: NOT CHANGED

## 2020-02-02 ASSESSMENT — PAIN DESCRIPTION - FREQUENCY: FREQUENCY: CONTINUOUS

## 2020-02-02 ASSESSMENT — PAIN DESCRIPTION - PAIN TYPE: TYPE: ACUTE PAIN

## 2020-02-02 ASSESSMENT — PAIN DESCRIPTION - LOCATION: LOCATION: ARM

## 2020-02-02 ASSESSMENT — PAIN SCALES - GENERAL
PAINLEVEL_OUTOF10: 0
PAINLEVEL_OUTOF10: 8
PAINLEVEL_OUTOF10: 0

## 2020-02-02 ASSESSMENT — PAIN DESCRIPTION - DESCRIPTORS: DESCRIPTORS: ACHING;DULL;DISCOMFORT

## 2020-02-02 ASSESSMENT — PAIN DESCRIPTION - ORIENTATION: ORIENTATION: RIGHT

## 2020-02-02 ASSESSMENT — PAIN DESCRIPTION - ONSET: ONSET: ON-GOING

## 2020-02-02 NOTE — PROGRESS NOTES
[facility]  Nephrology Attending Progress Note  Ehsan Van. Sammie Ramirez MD        SUBJECTIVE:     1/31: The pt is a 77 yo female with a pmh of CKD Stage 5 followed by Dr Joseph Perez of our practice . She presented to the er with urinary frequency, fatigue, and nasuea, and back pain. She was seen by us in the hospital in 7/2019 for arf on ckd in the setting of uti, hypoglycemia, lle cellulitis, and dehydration. Pino Bryant has a pmh of djd, nephrolithiasis, sarcoid,  depresison, htn, anemia of ckd, ckd 5. Her labs now show na 142 k 5 co2 16 bun 56 cr 5.6, gfr 9, ca 8.1, alb 2.7, wbc 10.2, hgb 9.6, plt 144. Her baseline cr is 3 from 8/2019 with a gfr of 15. She has been started on rocephin and ivf. Imaging show bilateral nonobstructing stones. 2/1: no dysuria, cp sob    2/2: seen I nroom, feels good, no cp or sob.  No dysuria      PROBLEM LIST:    Patient Active Problem List   Diagnosis    Neurologic gait dysfunction    Renal failure, acute on chronic (HCC)    Diabetes mellitus ([address]    Hypertension    Arthritis    Kidney disease    Knee problem    Anemia due to stage 3 chronic kidney disease (HCC)    Shortness of breath    PERLA (acute kidney injury) ([address]    Hypoglycemia associated with diabetes ([address]    Unable to ambulate    Primary osteoarthritis of both knees    Acute on chronic renal insufficiency    Acute renal failure (HCC)    Acute cystitis without hematuria    Hyperkalemia    Metabolic acidosis    Elevated troponin        PAST MEDICAL HISTORY:    Past Medical History:   Diagnosis Date    Arthritis     knees    Chronic knee pain     Depression     Diabetes mellitus ([address]     type 2    Gall stones     Hypertension     Kidney disease     Kidney stones     Obesity     Sarcoidosis     SOBOE 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       DIET:    DIET RENAL;     PHYSICAL EXAM:     Patient Vitals for the past 24 hrs:   BP Temp Temp src Pulse

## 2020-02-02 NOTE — PLAN OF CARE
Problem: Urinary Elimination:  Goal: Signs and symptoms of infection will decrease  Description  Signs and symptoms of infection will decrease  Outcome: Met This Shift     Problem: Falls - Risk of:  Goal: Will remain free from falls  Description  Will remain free from falls  Outcome: Met This Shift     Problem: Pain:  Goal: Pain level will decrease  Description  Pain level will decrease  Outcome: Met This Shift

## 2020-02-02 NOTE — PROGRESS NOTES
Admit Date: 1/31/2020       Subjective: On rocephin emperically for uti. Culture 100,000 gm - rods pending  Pt feels better today, appetite improving.   Was up in chair yesterday and states she ambulated to BR    K+ down to 5  Labs pending today       Objective:    Scheduled Meds:  Current Facility-Administered Medications   Medication Dose Route Frequency Provider Last Rate Last Dose    sodium bicarbonate tablet 650 mg  650 mg Oral BID Maegan Deleon MD   650 mg at 02/02/20 0828    [START ON 2/3/2020] epoetin derek-epbx (RETACRIT) injection 4,000 Units  4,000 Units Subcutaneous Once per day on Mon Wed Fri Maegan Deleon MD        ALPRAZolam Seretha Ced) tablet 0.25 mg  0.25 mg Oral Nightly PRN Naheed Leal MD   0.25 mg at 02/01/20 2146    amLODIPine (NORVASC) tablet 10 mg  10 mg Oral Daily Naheed Leal MD   10 mg at 02/02/20 0827    calcitRIOL (ROCALTROL) capsule 0.25 mcg  0.25 mcg Oral Daily Naheed Leal MD   0.25 mcg at 02/02/20 0827    gabapentin (NEURONTIN) capsule 300 mg  300 mg Oral TID Naheed Leal MD   300 mg at 02/02/20 5590    hydrALAZINE (APRESOLINE) tablet 50 mg  50 mg Oral TID Naheed Leal MD   50 mg at 02/02/20 0828    oxyCODONE-acetaminophen (PERCOCET) 5-325 MG per tablet 1 tablet  1 tablet Oral Q6H PRN Naheed Leal MD   1 tablet at 02/02/20 0829    magnesium hydroxide (MILK OF MAGNESIA) 400 MG/5ML suspension 30 mL  30 mL Oral Daily PRN Naheed Leal MD        [facility]ansetron [facility]) injection 4 mg  4 mg Intravenous Q6H PRN Naheed Leal MD   4 mg at 02/01/20 1913    0.9 % sodium chloride infusion   Intravenous Continuous Naheed Leal MD 75 mL/hr at 02/02/20 0546      heparin (porcine) injection 5,000 Units  5,000 Units Subcutaneous Q8H Naheed Leal MD   5,000 Units at 02/02/20 0829    cefTRIAXone (ROCEPHIN) 1 g in sterile water 10 mL IV syringe  1 g Intravenous Q24H Naheed Leal MD   1 g at 02/02/20 0828       Continuous Infusions  :   sodium chloride 75 mL/hr at 02/02/20 0546       PRN 72 hours.     Invalid input(s): LDLCALCU  ABGs: No results found for: PHART, PO2ART, OKE5WXO  SpO2 Readings from Last 1 Encounters:   02/02/20 96%       Last 3 Troponin:    Lab Results   Component Value Date    TROPONINI 0.19 02/01/2020    TROPONINI 0.19 01/31/2020    TROPONINI 0.17 01/31/2020     Lab Results   Component Value Date    CKTOTAL 25 (L) 08/16/2013    CKTOTAL 28 (L) 08/15/2013    CKTOTAL 622 (H) 06/26/2011    CKMB 1.0 08/15/2013    CKMB 3.9 06/26/2011    CKMB 3.0 06/26/2011    TROPONINI 0.19 (H) 02/01/2020    TROPONINI 0.19 (H) 01/31/2020    TROPONINI 0.17 (H) 01/31/2020        TSH:    Lab Results   Component Value Date    TSH 1.945 08/16/2013             U/A:     Lab Results   Component Value Date    COLORU Yellow 01/31/2020    PHUR 5.5 01/31/2020    WBCUA PACKED 01/31/2020    WBCUA 5-10 08/17/2011    RBCUA 10-20 01/31/2020    RBCUA 0-1 06/26/2013    BACTERIA FEW 01/31/2020    CLARITYU TURBID 01/31/2020    SPECGRAV >=1.030 01/31/2020    LEUKOCYTESUR MODERATE 01/31/2020    UROBILINOGEN 0.2 01/31/2020    BILIRUBINUR Negative 01/31/2020    BILIRUBINUR NEGATIVE 08/17/2011    BLOODU SMALL 01/31/2020    GLUCOSEU Negative 01/31/2020    GLUCOSEU NEGATIVE 08/17/2011    AMORPHOUS FEW 06/25/2011          Iron studies:  Lab Results   Component Value Date    FERRITIN 206 02/01/2020     Bone disease:  Lab Results   Component Value Date     (H) 02/01/2020    MG 2.1 05/29/2019    PHOS 6.4 (H) 02/01/2020     Nutrition:No results found for: ALB           Assessment:  Patient Active Problem List   Diagnosis Code    Neurologic gait dysfunction R26.9    Renal failure, acute on chronic (HCC) N17.9, N18.9    Diabetes mellitus [address] E11.9    Hypertension I10    Arthritis M19.90    Kidney disease N28.9    Knee problem M25.9    Anemia due to stage 3 chronic kidney disease (HCC) N18.3, D63.1    Shortness of breath R06.02    PERLA (acute kidney injury) ([facility] [facility] N17.9    Hypoglycemia associated with diabetes ([facility]

## 2020-02-03 LAB
ALBUMIN SERPL-MCNC: 2.2 G/DL (ref 3.5–5.2)
ALP BLD-CCNC: 79 U/L (ref 35–104)
ALT SERPL-CCNC: 5 U/L (ref 0–32)
ANION GAP SERPL CALCULATED.3IONS-SCNC: 8 MMOL/L (ref 7–16)
AST SERPL-CCNC: 9 U/L (ref 0–31)
BASOPHILS ABSOLUTE: 0.03 E9/L (ref 0–0.2)
BASOPHILS RELATIVE PERCENT: 0.3 % (ref 0–2)
BILIRUB SERPL-MCNC: <0.2 MG/DL (ref 0–1.2)
BUN BLDV-MCNC: 52 MG/DL (ref 8–23)
CALCIUM SERPL-MCNC: 8.1 MG/DL (ref 8.6–10.2)
CHLORIDE BLD-SCNC: 117 MMOL/L (ref 98–107)
CO2: 15 MMOL/L (ref 22–29)
CREAT SERPL-MCNC: 5 MG/DL (ref 0.5–1)
EOSINOPHILS ABSOLUTE: 0.29 E9/L (ref 0.05–0.5)
EOSINOPHILS RELATIVE PERCENT: 3 % (ref 0–6)
GFR AFRICAN AMERICAN: 10
GFR NON-AFRICAN AMERICAN: 9 ML/MIN/1.73
GLUCOSE BLD-MCNC: 153 MG/DL (ref 74–99)
HCT VFR BLD CALC: 28.6 % (ref 34–48)
HEMOGLOBIN: 8.4 G/DL (ref 11.5–15.5)
IMMATURE GRANULOCYTES #: 0.12 E9/L
IMMATURE GRANULOCYTES %: 1.3 % (ref 0–5)
LYMPHOCYTES ABSOLUTE: 1.12 E9/L (ref 1.5–4)
LYMPHOCYTES RELATIVE PERCENT: 11.8 % (ref 20–42)
MCH RBC QN AUTO: 29 PG (ref 26–35)
MCHC RBC AUTO-ENTMCNC: 29.4 % (ref 32–34.5)
MCV RBC AUTO: 98.6 FL (ref 80–99.9)
MONOCYTES ABSOLUTE: 0.99 E9/L (ref 0.1–0.95)
MONOCYTES RELATIVE PERCENT: 10.4 % (ref 2–12)
NEUTROPHILS ABSOLUTE: 6.98 E9/L (ref 1.8–7.3)
NEUTROPHILS RELATIVE PERCENT: 73.2 % (ref 43–80)
PDW BLD-RTO: 14.2 FL (ref 11.5–15)
PLATELET # BLD: 191 E9/L (ref 130–450)
PMV BLD AUTO: 10.4 FL (ref 7–12)
POTASSIUM SERPL-SCNC: 5 MMOL/L (ref 3.5–5)
RBC # BLD: 2.9 E12/L (ref 3.5–5.5)
SODIUM BLD-SCNC: 140 MMOL/L (ref 132–146)
TOTAL PROTEIN: 5.3 G/DL (ref 6.4–8.3)
WBC # BLD: 9.5 E9/L (ref 4.5–11.5)

## 2020-02-03 PROCEDURE — 97162 PT EVAL MOD COMPLEX 30 MIN: CPT

## 2020-02-03 PROCEDURE — 6360000002 HC RX W HCPCS: Performed by: FAMILY MEDICINE

## 2020-02-03 PROCEDURE — 97530 THERAPEUTIC ACTIVITIES: CPT

## 2020-02-03 PROCEDURE — 2580000003 HC RX 258: Performed by: FAMILY MEDICINE

## 2020-02-03 PROCEDURE — 97166 OT EVAL MOD COMPLEX 45 MIN: CPT

## 2020-02-03 PROCEDURE — 1200000000 HC SEMI PRIVATE

## 2020-02-03 PROCEDURE — 6360000002 HC RX W HCPCS: Performed by: INTERNAL MEDICINE

## 2020-02-03 PROCEDURE — 6370000000 HC RX 637 (ALT 250 FOR IP): Performed by: FAMILY MEDICINE

## 2020-02-03 PROCEDURE — 36415 COLL VENOUS BLD VENIPUNCTURE: CPT

## 2020-02-03 PROCEDURE — 85025 COMPLETE CBC W/AUTO DIFF WBC: CPT

## 2020-02-03 PROCEDURE — 80053 COMPREHEN METABOLIC PANEL: CPT

## 2020-02-03 PROCEDURE — 97535 SELF CARE MNGMENT TRAINING: CPT

## 2020-02-03 PROCEDURE — 6370000000 HC RX 637 (ALT 250 FOR IP): Performed by: INTERNAL MEDICINE

## 2020-02-03 RX ADMIN — GABAPENTIN 300 MG: 300 CAPSULE ORAL at 08:31

## 2020-02-03 RX ADMIN — HEPARIN SODIUM 5000 UNITS: 10000 INJECTION INTRAVENOUS; SUBCUTANEOUS at 16:50

## 2020-02-03 RX ADMIN — HEPARIN SODIUM 5000 UNITS: 10000 INJECTION INTRAVENOUS; SUBCUTANEOUS at 08:32

## 2020-02-03 RX ADMIN — OXYCODONE HYDROCHLORIDE AND ACETAMINOPHEN 1 TABLET: 5; 325 TABLET ORAL at 13:50

## 2020-02-03 RX ADMIN — GABAPENTIN 300 MG: 300 CAPSULE ORAL at 13:57

## 2020-02-03 RX ADMIN — CALCIUM ACETATE 1334 MG: 667 CAPSULE ORAL at 08:32

## 2020-02-03 RX ADMIN — AMLODIPINE BESYLATE 10 MG: 10 TABLET ORAL at 08:32

## 2020-02-03 RX ADMIN — OXYCODONE HYDROCHLORIDE AND ACETAMINOPHEN 1 TABLET: 5; 325 TABLET ORAL at 05:02

## 2020-02-03 RX ADMIN — CALCIUM ACETATE 1334 MG: 667 CAPSULE ORAL at 12:20

## 2020-02-03 RX ADMIN — ONDANSETRON HYDROCHLORIDE 4 MG: 2 SOLUTION INTRAMUSCULAR; INTRAVENOUS at 10:34

## 2020-02-03 RX ADMIN — CALCITRIOL 0.25 MCG: 0.25 CAPSULE ORAL at 08:32

## 2020-02-03 RX ADMIN — CEFTRIAXONE SODIUM 1 G: 1 INJECTION, POWDER, FOR SOLUTION INTRAMUSCULAR; INTRAVENOUS at 08:31

## 2020-02-03 RX ADMIN — OXYCODONE HYDROCHLORIDE AND ACETAMINOPHEN 1 TABLET: 5; 325 TABLET ORAL at 21:36

## 2020-02-03 RX ADMIN — HYDRALAZINE HYDROCHLORIDE 50 MG: 50 TABLET, FILM COATED ORAL at 21:35

## 2020-02-03 RX ADMIN — HEPARIN SODIUM 5000 UNITS: 10000 INJECTION INTRAVENOUS; SUBCUTANEOUS at 23:48

## 2020-02-03 RX ADMIN — GABAPENTIN 300 MG: 300 CAPSULE ORAL at 21:35

## 2020-02-03 RX ADMIN — SODIUM BICARBONATE 650 MG: 650 TABLET ORAL at 21:36

## 2020-02-03 RX ADMIN — CALCIUM ACETATE 1334 MG: 667 CAPSULE ORAL at 16:50

## 2020-02-03 RX ADMIN — HYDRALAZINE HYDROCHLORIDE 50 MG: 50 TABLET, FILM COATED ORAL at 08:32

## 2020-02-03 RX ADMIN — SODIUM BICARBONATE 650 MG: 650 TABLET ORAL at 08:32

## 2020-02-03 RX ADMIN — EPOETIN ALFA-EPBX 4000 UNITS: 4000 INJECTION, SOLUTION INTRAVENOUS; SUBCUTANEOUS at 10:35

## 2020-02-03 RX ADMIN — SODIUM CHLORIDE: 9 INJECTION, SOLUTION INTRAVENOUS at 23:48

## 2020-02-03 RX ADMIN — HYDRALAZINE HYDROCHLORIDE 50 MG: 50 TABLET, FILM COATED ORAL at 13:57

## 2020-02-03 ASSESSMENT — PAIN DESCRIPTION - LOCATION: LOCATION: KNEE

## 2020-02-03 ASSESSMENT — PAIN DESCRIPTION - PROGRESSION: CLINICAL_PROGRESSION: NOT CHANGED

## 2020-02-03 ASSESSMENT — PAIN DESCRIPTION - DESCRIPTORS: DESCRIPTORS: ACHING

## 2020-02-03 ASSESSMENT — PAIN SCALES - GENERAL
PAINLEVEL_OUTOF10: 8
PAINLEVEL_OUTOF10: 4
PAINLEVEL_OUTOF10: 6
PAINLEVEL_OUTOF10: 8

## 2020-02-03 ASSESSMENT — PAIN DESCRIPTION - FREQUENCY: FREQUENCY: INTERMITTENT

## 2020-02-03 ASSESSMENT — PAIN DESCRIPTION - ORIENTATION: ORIENTATION: RIGHT;LEFT

## 2020-02-03 ASSESSMENT — PAIN DESCRIPTION - ONSET: ONSET: ON-GOING

## 2020-02-03 ASSESSMENT — PAIN DESCRIPTION - PAIN TYPE: TYPE: CHRONIC PAIN

## 2020-02-03 ASSESSMENT — PAIN - FUNCTIONAL ASSESSMENT: PAIN_FUNCTIONAL_ASSESSMENT: PREVENTS OR INTERFERES WITH MANY ACTIVE NOT PASSIVE ACTIVITIES

## 2020-02-03 NOTE — PROGRESS NOTES
education  Pt educated on purpose of PT consult, importance of mobility, safety with mobility, hand placement with transfers, posture in standing, safe walker use, gait    Patient response to education:   Pt verbalized understanding Pt demonstrated skill Pt requires further education in this area   Yes  Partially with verbal cues and assist Yes      ASSESSMENT:    Treatment:  Patient practiced and was instructed in the following treatment:  Safety with transfers, walker use, proper hand placement, postural training in standing and seated, stride length with gait    Additional Comments:  Patient found seated in the bedside chair and was asking staff to return to bed. Patient required much encouragement to participate with assessment as she reported being too nauseous \"to do anything\". Patient had a difficult time standing from the chair as she was too large for the chair in which she was placed. Patient eventually able to stand from the chair and reported needing to sit on the EOB for \"a while\". Patient performed exercises in the chair, but STS from the bed. Patient with moderate forward flexed posture during gait and not able to improved posture with verbal or tactile cues. Patient with wide DULCE and flat foot placement during gait and not able to ambulate farther due to knee pain. Patient unable to lift legs up on the bed but once there able to scoot self to the [facility]. HOB elevated to comfort and RN in room administering medication. Call light and tray table in reach. Pt's/ family goals   1. To feel better. Patient and or family understand(s) diagnosis, prognosis, and plan of care. Yes     PLAN:    PT care will be provided in accordance with the objectives noted above. Exercises and functional mobility practice will be used as well as appropriate assistive devices or modalities to obtain goals. Patient and family education will also be administered as needed.     Frequency of treatments: 2-5x/week x 1-2 weeks.    Time in  1320  Time out  1354    Moderate Complexity Evaluation + Total Treatment Time  24 min    CPT codes:  [] Gait training 18489 0 minutes  [] Manual therapy 86215 0 minutes  [x] Therapeutic activities 46968 24 minutes  [] Therapeutic exercises 52979 0 minutes  [] Neuromuscular reeducation 06750 0 minutes     Evaluation Time includes thorough review of current medical information, gathering information on past medical history/social history and prior level of function, completion of standardized testing/informal observation of tasks, assessment of data and education on plan of care and goals    Piero Badillo, PT, DPT  License [id number]

## 2020-02-03 NOTE — PROGRESS NOTES
Occupational Therapy  OCCUPATIONAL THERAPY INITIAL EVALUATION      Date:2/3/2020  Patient Name: Katherine Joya  MRN: [id number]  : 1950  Room: [address] [place], [place] [address]      Evaluating OT: Alvina Mitchell OTR/L #[id number]    AM-PAC Daily Activity Raw Score:     Recommended Adaptive Equipment: TBD     Diagnosis: Acute on chronic renal insufficiency [N28.9, N18.9]  Acute renal failure ([address] [N17.9]  Referring Physician: Delia Chu MD  Patient presented to ED for fatigue, back pain, nausea, and urinary frequency    Pertinent Medical History: Arthritis (knees), chronic knee pain, depression, DM, gall stones, HTN, Kidney disease, kidney stones, obesity, sarcoidosis, SOB, tremor, urinary anomaly     Precautions:  Falls, Pure wick     Home Living: Pt lives alone in 2 story home with basement (laundry), full flight from basement <>1st floor, 1st <>2nd floor (B railings for each set of stairs), 2-3 CINTHIA (B hand rails), bed/bath on 2nd floor, half bath 1st floor  Bathroom setup: tub/shower, tub transfer bench, standard commode  Equipment owned: tub transfer bench, ww    Prior Level of Function: Independent with ADLs , Independent with IADLs; ambulated w/ ww PRN depending on fatigue/weakness  Driving: Yes    Pain Level: Pt c/o of discomfort/soreness of R arm, neck/back discomfort, educated pt on pain management  Cognition: A&O: 4/4; Follows 1-2 step directions   Memory:  fair +   Sequencing:  fair    Problem solving: fair    Judgement/safety: fair -     Functional Assessment:   Initial Eval Status  Date: 2/3/20 Treatment Status  Date: STGs = LTGs  Treatment frequency: 1-4x/wk   Feeding Modified [place]   --   Grooming Minimal Assist (standing at sink, assistance for unsteadiness)  Supervision    UB Dressing Minimal Assist   Supervision    LB Dressing Moderate Assist (simulated socks/pants) (donned/doffed slip-on shoes mod.  I)  SBA   Bathing Moderate Assist  Stand by Assist    Toileting Moderate Assist   Stand by Assist    Bed Mobility  Supine to sit: Minimal Assist   Sit to supine:   NT   Supine to sit: Supervision   Sit to supine: Supervision    Functional Transfers Moderate Assist   Stand by Assist    Functional Mobility Moderate Assist w/ ww  Stand by Assist    Balance Sitting:     Static:  SBA    Dynamic:Min. A  Standing: w/ ww    Static: Min. A    Dynamic: Mod. A     Activity Tolerance Poor +  Good   Visual/  Perceptual Glasses: No                  Hand dominance: R     Strength ROM Additional Info:    RUE  4-/5  WFL Fair+  and wfl FMC/dexterity noted during ADL tasks       LUE 4-/5  WFL Fair+  and wfl FMC/dexterity noted during ADL tasks         Hearing: WFL  Sensation:  Pt c/o L arm tingling   Tone: WFL   Edema: None noted                            Comments: Overall pt demonstrated decreased independence and safety during completion of ADL/functional tranfers/mobility tasks. Pt demonstrating fair understanding of education/techniques, requiring additional training / education. Pt would benefit from continued skilled OT to increase functional independence and quality of life. Treatment: Upon arrival, patient lying in bed and agreeable to OT session at this time. RN approved. Pure wick removed. Therapist facilitated bed mobility(supine>sit), functional transfers (EOB, sit>stand; commode, sit<>stand, bedside chair, stand>sit), standing tolerance tasks and functional ambulation task with ww - skilled cuing on hand placement, posture, ww management, body mechanics, energy conservation and safety.   Therapist facilitated self-care retraining: UB/LB self-care tasks(donning slip-on shoes), toileting task (in bathroom w/ hygiene/clothing management, pt c/o of mild nausea/lightheaded while sitting on commode-gave juice and unable to retrieve accurate BP reading--educated pt on deep breathing techniques/posture--NI=068 bpm, monitored HR until return back to WNL--symptoms decreased w/ rest) and standing grooming task(at sink) while Demonstrated fair understanding.       Mod Evaluation +   Treatment Time In: 9:15          Treatment Time Out: 9:45              Treatment Charges: Mins Units   Ther Ex  16195     Manual Therapy 47479     Thera Activities 56519 10 1   ADL/Home Mgt 29094 20 1   Neuro Re-ed 60288     Group Therapy      Orthotic manage/training  66930     Non-Billable Time     Total Timed Treatment 30  Jessica 172, OTR/L #[id number]

## 2020-02-03 NOTE — PROGRESS NOTES
[facility]  Nephrology Attending Progress Note  Real De La Torre Chevy Ko MD        SUBJECTIVE:     1/31: The pt is a 77 yo female with a pmh of CKD Stage 5 followed by Dr Joy Ro of our practice . She presented to the er with urinary frequency, fatigue, and nasuea, and back pain. She was seen by us in the hospital in 7/2019 for arf on ckd in the setting of uti, hypoglycemia, lle cellulitis, and dehydration. Yael Ta has a pmh of djd, nephrolithiasis, sarcoid,  depresison, htn, anemia of ckd, ckd 5. Her labs now show na 142 k 5 co2 16 bun 56 cr 5.6, gfr 9, ca 8.1, alb 2.7, wbc 10.2, hgb 9.6, plt 144. Her baseline cr is 3 from 8/2019 with a gfr of 15. She has been started on rocephin and ivf. Imaging show bilateral nonobstructing stones. 2/1: no dysuria, cp sob    2/2: seen I nroom, feels good, no cp or sob. No dysuria    2/3/2020- awake and alert. She is complaining of nausea this am, feels very nervous.         PROBLEM LIST:    Patient Active Problem List   Diagnosis    Neurologic gait dysfunction    Renal failure, acute on chronic (HCC)    Diabetes mellitus ([address]    Hypertension    Arthritis    Kidney disease    Knee problem    Anemia due to stage 3 chronic kidney disease (HCC)    Shortness of breath    PERLA (acute kidney injury) ([address]    Hypoglycemia associated with diabetes ([address]    Unable to ambulate    Primary osteoarthritis of both knees    Acute on chronic renal insufficiency    Acute renal failure (HCC)    Acute cystitis without hematuria    Hyperkalemia    Metabolic acidosis    Elevated troponin        PAST MEDICAL HISTORY:    Past Medical History:   Diagnosis Date    Arthritis     knees    Chronic knee pain     Depression     Diabetes mellitus ([address]     type 2    Gall stones     Hypertension     Kidney disease     Kidney stones     Obesity     Sarcoidosis     SOBOE 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       DIET:    DIET

## 2020-02-03 NOTE — PROGRESS NOTES
Subjective: The patient is awake and alert. No problems overnight. Denies chest pain, angina, and dyspnea. Denies abdominal pain. Tolerating diet. No nausea or vomiting. Feeling better. Objective:    /60   Pulse 97   Temp 98.4 °F (36.9 °C) (Temporal)   Resp 18   Wt 258 lb (117 kg)   SpO2 97%   BMI 47.19 kg/m²     Heart:  RRR, no murmurs, gallops, or rubs.   Lungs:  CTA bilaterally, no wheeze, rales or rhonchi  Abd: bowel sounds present, nontender, nondistended, no masses  Extrem:  No clubbing, cyanosis, or edema    CBC with Differential:    Lab Results   Component Value Date    WBC 9.5 02/03/2020    RBC 2.90 02/03/2020    HGB 8.4 02/03/2020    HCT 28.6 02/03/2020     02/03/2020    MCV 98.6 02/03/2020    MCH 29.0 02/03/2020    MCHC 29.4 02/03/2020    RDW 14.2 02/03/2020    SEGSPCT 71 08/16/2013    BANDSPCT 1 03/29/2016    METASPCT 1 07/03/2011    LYMPHOPCT 11.8 02/03/2020    MONOPCT 10.4 02/03/2020    MYELOPCT 1 06/26/2011    BASOPCT 0.3 02/03/2020    MONOSABS 0.99 02/03/2020    LYMPHSABS 1.12 02/03/2020    EOSABS 0.29 02/03/2020    BASOSABS 0.03 02/03/2020     CMP:    Lab Results   Component Value Date     02/03/2020    K 5.0 02/03/2020    K 5.3 02/01/2020     02/03/2020    CO2 15 02/03/2020    BUN 52 02/03/2020    CREATININE 5.0 02/03/2020    GFRAA 10 02/03/2020    LABGLOM 9 02/03/2020    GLUCOSE 153 02/03/2020    GLUCOSE 149 10/12/2011    PROT 5.3 02/03/2020    LABALBU 2.2 02/03/2020    LABALBU 4.1 10/12/2011    CALCIUM 8.1 02/03/2020    BILITOT <0.2 02/03/2020    ALKPHOS 79 02/03/2020    AST 9 02/03/2020    ALT 5 02/03/2020        Assessment:    Patient Active Problem List   Diagnosis    Neurologic gait dysfunction    [facility] failure, acute on chronic [facility]    Diabetes mellitus ([facility]    Hypertension    Arthritis    Kidney disease    Knee problem    Anemia due to stage 3 chronic kidney disease (HCC)    Shortness of breath    PERLA (acute kidney injury) ([facility]   

## 2020-02-04 LAB
ALBUMIN SERPL-MCNC: 1.9 G/DL (ref 3.5–5.2)
ALP BLD-CCNC: 76 U/L (ref 35–104)
ALT SERPL-CCNC: 6 U/L (ref 0–32)
ANION GAP SERPL CALCULATED.3IONS-SCNC: 11 MMOL/L (ref 7–16)
AST SERPL-CCNC: 9 U/L (ref 0–31)
BASOPHILS ABSOLUTE: 0.05 E9/L (ref 0–0.2)
BASOPHILS RELATIVE PERCENT: 0.7 % (ref 0–2)
BILIRUB SERPL-MCNC: <0.2 MG/DL (ref 0–1.2)
BUN BLDV-MCNC: 44 MG/DL (ref 8–23)
CALCIUM SERPL-MCNC: 8.1 MG/DL (ref 8.6–10.2)
CHLORIDE BLD-SCNC: 117 MMOL/L (ref 98–107)
CO2: 14 MMOL/L (ref 22–29)
CREAT SERPL-MCNC: 4.3 MG/DL (ref 0.5–1)
EOSINOPHILS ABSOLUTE: 0.31 E9/L (ref 0.05–0.5)
EOSINOPHILS RELATIVE PERCENT: 4.2 % (ref 0–6)
GFR AFRICAN AMERICAN: 12
GFR NON-AFRICAN AMERICAN: 10 ML/MIN/1.73
GLUCOSE BLD-MCNC: 148 MG/DL (ref 74–99)
HCT VFR BLD CALC: 30 % (ref 34–48)
HEMOGLOBIN: 8.9 G/DL (ref 11.5–15.5)
IMMATURE GRANULOCYTES #: 0.12 E9/L
IMMATURE GRANULOCYTES %: 1.6 % (ref 0–5)
LYMPHOCYTES ABSOLUTE: 0.86 E9/L (ref 1.5–4)
LYMPHOCYTES RELATIVE PERCENT: 11.8 % (ref 20–42)
MAGNESIUM: 1.7 MG/DL (ref 1.6–2.6)
MCH RBC QN AUTO: 29.8 PG (ref 26–35)
MCHC RBC AUTO-ENTMCNC: 29.7 % (ref 32–34.5)
MCV RBC AUTO: 100.3 FL (ref 80–99.9)
MONOCYTES ABSOLUTE: 0.88 E9/L (ref 0.1–0.95)
MONOCYTES RELATIVE PERCENT: 12 % (ref 2–12)
NEUTROPHILS ABSOLUTE: 5.09 E9/L (ref 1.8–7.3)
NEUTROPHILS RELATIVE PERCENT: 69.7 % (ref 43–80)
PDW BLD-RTO: 14 FL (ref 11.5–15)
PHOSPHORUS: 4.8 MG/DL (ref 2.5–4.5)
PLATELET # BLD: 208 E9/L (ref 130–450)
PMV BLD AUTO: 10.2 FL (ref 7–12)
POTASSIUM SERPL-SCNC: 4.7 MMOL/L (ref 3.5–5)
RBC # BLD: 2.99 E12/L (ref 3.5–5.5)
SODIUM BLD-SCNC: 142 MMOL/L (ref 132–146)
TOTAL PROTEIN: 5.1 G/DL (ref 6.4–8.3)
WBC # BLD: 7.3 E9/L (ref 4.5–11.5)

## 2020-02-04 PROCEDURE — 97530 THERAPEUTIC ACTIVITIES: CPT

## 2020-02-04 PROCEDURE — 83735 ASSAY OF MAGNESIUM: CPT

## 2020-02-04 PROCEDURE — 6370000000 HC RX 637 (ALT 250 FOR IP): Performed by: NURSE PRACTITIONER

## 2020-02-04 PROCEDURE — 85025 COMPLETE CBC W/AUTO DIFF WBC: CPT

## 2020-02-04 PROCEDURE — 1200000000 HC SEMI PRIVATE

## 2020-02-04 PROCEDURE — 6360000002 HC RX W HCPCS: Performed by: FAMILY MEDICINE

## 2020-02-04 PROCEDURE — 84100 ASSAY OF PHOSPHORUS: CPT

## 2020-02-04 PROCEDURE — 80053 COMPREHEN METABOLIC PANEL: CPT

## 2020-02-04 PROCEDURE — 2580000003 HC RX 258: Performed by: FAMILY MEDICINE

## 2020-02-04 PROCEDURE — 6370000000 HC RX 637 (ALT 250 FOR IP): Performed by: FAMILY MEDICINE

## 2020-02-04 PROCEDURE — 36415 COLL VENOUS BLD VENIPUNCTURE: CPT

## 2020-02-04 PROCEDURE — 6370000000 HC RX 637 (ALT 250 FOR IP): Performed by: INTERNAL MEDICINE

## 2020-02-04 RX ORDER — SODIUM BICARBONATE 650 MG/1
1300 TABLET ORAL 2 TIMES DAILY
Status: DISCONTINUED | OUTPATIENT
Start: 2020-02-04 | End: 2020-02-06 | Stop reason: HOSPADM

## 2020-02-04 RX ADMIN — HYDRALAZINE HYDROCHLORIDE 50 MG: 50 TABLET, FILM COATED ORAL at 20:12

## 2020-02-04 RX ADMIN — GABAPENTIN 300 MG: 300 CAPSULE ORAL at 20:11

## 2020-02-04 RX ADMIN — SODIUM CHLORIDE: 9 INJECTION, SOLUTION INTRAVENOUS at 12:04

## 2020-02-04 RX ADMIN — CEFTRIAXONE SODIUM 1 G: 1 INJECTION, POWDER, FOR SOLUTION INTRAMUSCULAR; INTRAVENOUS at 08:43

## 2020-02-04 RX ADMIN — SODIUM BICARBONATE 1300 MG: 650 TABLET ORAL at 20:11

## 2020-02-04 RX ADMIN — HYDRALAZINE HYDROCHLORIDE 50 MG: 50 TABLET, FILM COATED ORAL at 08:42

## 2020-02-04 RX ADMIN — CALCIUM ACETATE 1334 MG: 667 CAPSULE ORAL at 18:27

## 2020-02-04 RX ADMIN — CALCITRIOL 0.25 MCG: 0.25 CAPSULE ORAL at 08:42

## 2020-02-04 RX ADMIN — OXYCODONE HYDROCHLORIDE AND ACETAMINOPHEN 1 TABLET: 5; 325 TABLET ORAL at 18:27

## 2020-02-04 RX ADMIN — CALCIUM ACETATE 1334 MG: 667 CAPSULE ORAL at 08:42

## 2020-02-04 RX ADMIN — SODIUM BICARBONATE 650 MG: 650 TABLET ORAL at 08:42

## 2020-02-04 RX ADMIN — HEPARIN SODIUM 5000 UNITS: 10000 INJECTION INTRAVENOUS; SUBCUTANEOUS at 23:44

## 2020-02-04 RX ADMIN — HEPARIN SODIUM 5000 UNITS: 10000 INJECTION INTRAVENOUS; SUBCUTANEOUS at 08:43

## 2020-02-04 RX ADMIN — GABAPENTIN 300 MG: 300 CAPSULE ORAL at 08:42

## 2020-02-04 RX ADMIN — GABAPENTIN 300 MG: 300 CAPSULE ORAL at 16:15

## 2020-02-04 RX ADMIN — AMLODIPINE BESYLATE 10 MG: 10 TABLET ORAL at 08:42

## 2020-02-04 RX ADMIN — HYDRALAZINE HYDROCHLORIDE 50 MG: 50 TABLET, FILM COATED ORAL at 16:15

## 2020-02-04 RX ADMIN — HEPARIN SODIUM 5000 UNITS: 10000 INJECTION INTRAVENOUS; SUBCUTANEOUS at 16:15

## 2020-02-04 RX ADMIN — CALCIUM ACETATE 1334 MG: 667 CAPSULE ORAL at 16:14

## 2020-02-04 RX ADMIN — ONDANSETRON HYDROCHLORIDE 4 MG: 2 SOLUTION INTRAMUSCULAR; INTRAVENOUS at 10:39

## 2020-02-04 ASSESSMENT — PAIN SCALES - GENERAL
PAINLEVEL_OUTOF10: 9
PAINLEVEL_OUTOF10: 0

## 2020-02-04 ASSESSMENT — PAIN DESCRIPTION - LOCATION: LOCATION: ARM;NECK

## 2020-02-04 ASSESSMENT — PAIN DESCRIPTION - FREQUENCY: FREQUENCY: CONTINUOUS

## 2020-02-04 ASSESSMENT — PAIN - FUNCTIONAL ASSESSMENT: PAIN_FUNCTIONAL_ASSESSMENT: PREVENTS OR INTERFERES SOME ACTIVE ACTIVITIES AND ADLS

## 2020-02-04 ASSESSMENT — PAIN DESCRIPTION - PAIN TYPE: TYPE: ACUTE PAIN

## 2020-02-04 ASSESSMENT — PAIN DESCRIPTION - ORIENTATION: ORIENTATION: RIGHT

## 2020-02-04 ASSESSMENT — PAIN DESCRIPTION - ONSET: ONSET: ON-GOING

## 2020-02-04 ASSESSMENT — PAIN DESCRIPTION - PROGRESSION: CLINICAL_PROGRESSION: NOT CHANGED

## 2020-02-04 ASSESSMENT — PAIN DESCRIPTION - DESCRIPTORS: DESCRIPTORS: ACHING;DISCOMFORT

## 2020-02-04 NOTE — PROGRESS NOTES
Physical Therapy  Facility/Department: [facility]  Daily Treatment Note  NAME: Coby Melo  : 1950  MRN: [id number]    Date of Service: 2020    Referring Provider:  DAMARIS Weaver MD     Evaluating PT:  Geoff Taveras, PT, DPT      Room #:  0930/6786-E  Diagnosis:  Acute on chronic renal insufficiency  PMHx:  Arthritis, DM, HTN, Obestiy  Precautions:  Falls, Purewick catheter  Equipment Needs:  Has [employer]     SUBJECTIVE:  Pt lives alone in a 2 story home with 2 stairs to enter and grab bar. Bed is on 2nd floor and bath is on 2nd floor with flight and 1 rail (patient reported son to install 2nd rail). Pt ambulated with a [employer] \"sometimes\" PTA. Patient reported cooking, driving, and independent with ADLs/IADLs. Patient has a person to clean the home.     OBJECTIVE:    Initial Evaluation  Date: 2/3/2020 Treatment Date: 2020 Short Term/ Long Term   Goals   AM-PAC 6 Clicks 91/45 56/99      Was pt agreeable to Eval/treatment? Yes with encouragement. Yes       Does pt have pain? Reported pain in B knees as 8/10 with mobility. RN notified. No c/o pain.      Bed Mobility  Rolling: Min A  Supine to sit: NT  Sit to supine: Max A with BLEs up on the bed  Scooting: Min A Rolling: SBA  Supine to sit: NT  Sit to supine: Max A with BLEs up on the bed  Scooting: SBA to scoot in seated and in supine to HOB Rolling: SBA  Supine to sit: SBA  Sit to supine: SBA  Scooting: SBA   Transfers Sit to stand: Max A from chair, Min A with bed  Stand to sit: Min A  Stand pivot: Min A  Sit to stand:  Mod A  Stand to sit: Min A  Stand pivot: Min A Sit to stand: SBA  Stand to sit: SBA  Stand pivot: SBA   Ambulation    3 feet x 2 laterally with [employer] with Mod A 15 feet with [employer] with Min A  >50 feet with [employer] with SBA   Stair negotiation: ascended and descended  NT NT  4 steps with 1 rail Min A   ROM BUE:  WFL  BLE:  WFL       Strength BUE:  4/5  BLE:  4-/5   Increase strength in BLEs 1/3 grade   Balance Sitting EOB:  SBA  Dynamic Standing:

## 2020-02-04 NOTE — PROGRESS NOTES
Subjective: The patient is awake and alert. No problems overnight. Denies chest pain, angina, and dyspnea. Denies abdominal pain. Tolerating diet. No nausea or vomiting. Objective:    BP (!) 120/56   Pulse 78   Temp 97.6 °F (36.4 °C) (Temporal)   Resp 18   Wt 258 lb (117 kg)   SpO2 96%   BMI 47.19 kg/m²     Heart:  RRR, no murmurs, gallops, or rubs.   Lungs:  CTA bilaterally, no wheeze, rales or rhonchi  Abd: bowel sounds present, nontender, nondistended, no masses  Extrem:  No clubbing, cyanosis, or edema    CBC with Differential:    Lab Results   Component Value Date    WBC 7.3 02/04/2020    RBC 2.99 02/04/2020    HGB 8.9 02/04/2020    HCT 30.0 02/04/2020     02/04/2020    .3 02/04/2020    MCH 29.8 02/04/2020    MCHC 29.7 02/04/2020    RDW 14.0 02/04/2020    SEGSPCT 71 08/16/2013    BANDSPCT 1 03/29/2016    METASPCT 1 07/03/2011    LYMPHOPCT 11.8 02/04/2020    MONOPCT 12.0 02/04/2020    MYELOPCT 1 06/26/2011    BASOPCT 0.7 02/04/2020    MONOSABS 0.88 02/04/2020    LYMPHSABS 0.86 02/04/2020    EOSABS 0.31 02/04/2020    BASOSABS 0.05 02/04/2020     CMP:    Lab Results   Component Value Date     02/04/2020    K 4.7 02/04/2020    K 5.3 02/01/2020     02/04/2020    CO2 14 02/04/2020    BUN 44 02/04/2020    CREATININE 4.3 02/04/2020    GFRAA 12 02/04/2020    LABGLOM 10 02/04/2020    GLUCOSE 148 02/04/2020    GLUCOSE 149 10/12/2011    PROT 5.1 02/04/2020    LABALBU 1.9 02/04/2020    LABALBU 4.1 10/12/2011    CALCIUM 8.1 02/04/2020    BILITOT <0.2 02/04/2020    ALKPHOS 76 02/04/2020    AST 9 02/04/2020    ALT 6 02/04/2020        Assessment:    Patient Active Problem List   Diagnosis    Neurologic gait dysfunction    [facility] failure, acute on chronic [facility]    Diabetes mellitus ([facility]    Hypertension    Arthritis    Kidney disease    Knee problem    Anemia due to stage 3 chronic kidney disease (HCC)    Shortness of breath    PERLA (acute kidney injury) ([facility]    Hypoglycemia associated with diabetes [facility]    Unable to ambulate    Primary osteoarthritis of both knees    Acute on chronic renal insufficiency    Acute renal failure (HCC)    Acute cystitis without hematuria    Hyperkalemia    Metabolic acidosis    Elevated troponin   Renal function improving    Plan:  Continue IV fluids  Continue antibiotics  Discharge next 24-48 hrs.  Once renal function is back to baseline        Francine Murguia  12:46 PM  2/4/2020

## 2020-02-04 NOTE — CARE COORDINATION
Plan remains home with [facility]. Per family med note today, Discharge next 24-48 hrs. Once renal function is back to baseline. [facility] from [address] updated. Will need home health care orders for CPA, med compliance and PT/OT prior to discharge.   Benjamin Arce RN CM

## 2020-02-04 NOTE — PROGRESS NOTES
K 4.7 5.0 4.7   * 117* 117*   CO2 13* 15* 14*   BUN 52* 52* 44*   CREATININE 5.1* 5.0* 4.3*   LABGLOM 8 9 10   GLUCOSE 229* 153* 148*   CALCIUM 7.9* 8.1* 8.1*   ALT 7 5 6   AST 10 9 9   BILITOT <0.2 <0.2 <0.2   ALKPHOS 82 79 76   MG  --   --  1.7   PHOS  --   --  4.8*       Lab Results   Component Value Date    LABALBU 1.9 (L) 02/04/2020    LABALBU 2.2 (L) 02/03/2020    LABALBU 2.1 (L) 02/02/2020     Lab Results   Component Value Date    TSH 1.945 08/16/2013     No results found for: PHART, PO2ART, LFW7AAR    Iron Studies:   Lab Results   Component Value Date    IRON 120 05/02/2019    TIBC 246 (L) 05/02/2019    FERRITIN 206 02/01/2020         IMPRESSION/RECOMMENDATIONS:      1. Chronic kidney disease at stage 5 (recent baseline scr 3.1 - 3.4) w/ Hx sarcoid  Now above recent baseline in setting of mild dehydration and uti  She remains non oliguric with nausea this am.   gentle fluids; Creatinine 5.6-5.6-5.1-5.0  follow strict I/Os and daily labs  Cr starting to come down, continue on IVF for the present time.      2. UTI- + urine culture for GNR  started on Rocephin       3. Hyperkalemia  mild in setting of ckd 5  Has been on Valtessa in recent past  Stable.      4. Metabolic acidosis  Due to ckd; CO2 62-85-30-15-14. Started oral hoc3, increased 2/4/2020  Goal >20     5. Anemia of ckd  Nl fe b12 fol heme  Started ARUN     6.  HTN   follow on current meds  norvasc apresoline     7. secondary hyperparathryoidism  Follow on rocaltrol  Check pth  PO4- 4.8, goal 3.5-5.5  In goal.          Roberto Carrillo, APRN - CNP     Pt seen and examined agree with above  Cr improving with Bertin Quiroga J Simran

## 2020-02-05 PROBLEM — R06.02 SHORTNESS OF BREATH: Status: RESOLVED | Noted: 2019-06-02 | Resolved: 2020-02-05

## 2020-02-05 PROBLEM — R26.2 UNABLE TO AMBULATE: Status: RESOLVED | Noted: 2019-08-12 | Resolved: 2020-02-05

## 2020-02-05 PROBLEM — E11.649 HYPOGLYCEMIA ASSOCIATED WITH DIABETES (HCC): Status: RESOLVED | Noted: 2019-07-19 | Resolved: 2020-02-05

## 2020-02-05 LAB
ALBUMIN SERPL-MCNC: 1.9 G/DL (ref 3.5–5.2)
ALP BLD-CCNC: 69 U/L (ref 35–104)
ALT SERPL-CCNC: 5 U/L (ref 0–32)
ANION GAP SERPL CALCULATED.3IONS-SCNC: 10 MMOL/L (ref 7–16)
AST SERPL-CCNC: 7 U/L (ref 0–31)
BASOPHILS ABSOLUTE: 0.04 E9/L (ref 0–0.2)
BASOPHILS RELATIVE PERCENT: 0.5 % (ref 0–2)
BILIRUB SERPL-MCNC: <0.2 MG/DL (ref 0–1.2)
BLOOD CULTURE, ROUTINE: NORMAL
BUN BLDV-MCNC: 43 MG/DL (ref 8–23)
CALCIUM SERPL-MCNC: 8.2 MG/DL (ref 8.6–10.2)
CHLORIDE BLD-SCNC: 119 MMOL/L (ref 98–107)
CO2: 15 MMOL/L (ref 22–29)
CREAT SERPL-MCNC: 4.3 MG/DL (ref 0.5–1)
CULTURE, BLOOD 2: NORMAL
EOSINOPHILS ABSOLUTE: 0.26 E9/L (ref 0.05–0.5)
EOSINOPHILS RELATIVE PERCENT: 3.5 % (ref 0–6)
GFR AFRICAN AMERICAN: 12
GFR NON-AFRICAN AMERICAN: 10 ML/MIN/1.73
GLUCOSE BLD-MCNC: 140 MG/DL (ref 74–99)
HCT VFR BLD CALC: 26.2 % (ref 34–48)
HEMOGLOBIN: 7.7 G/DL (ref 11.5–15.5)
IMMATURE GRANULOCYTES #: 0.15 E9/L
IMMATURE GRANULOCYTES %: 2 % (ref 0–5)
LYMPHOCYTES ABSOLUTE: 1.04 E9/L (ref 1.5–4)
LYMPHOCYTES RELATIVE PERCENT: 13.8 % (ref 20–42)
MCH RBC QN AUTO: 29.4 PG (ref 26–35)
MCHC RBC AUTO-ENTMCNC: 29.4 % (ref 32–34.5)
MCV RBC AUTO: 100 FL (ref 80–99.9)
MONOCYTES ABSOLUTE: 0.89 E9/L (ref 0.1–0.95)
MONOCYTES RELATIVE PERCENT: 11.8 % (ref 2–12)
NEUTROPHILS ABSOLUTE: 5.15 E9/L (ref 1.8–7.3)
NEUTROPHILS RELATIVE PERCENT: 68.4 % (ref 43–80)
PDW BLD-RTO: 14.1 FL (ref 11.5–15)
PLATELET # BLD: 203 E9/L (ref 130–450)
PMV BLD AUTO: 9.8 FL (ref 7–12)
POTASSIUM SERPL-SCNC: 5 MMOL/L (ref 3.5–5)
RBC # BLD: 2.62 E12/L (ref 3.5–5.5)
SODIUM BLD-SCNC: 144 MMOL/L (ref 132–146)
TOTAL PROTEIN: 4.7 G/DL (ref 6.4–8.3)
WBC # BLD: 7.5 E9/L (ref 4.5–11.5)

## 2020-02-05 PROCEDURE — 6370000000 HC RX 637 (ALT 250 FOR IP): Performed by: NURSE PRACTITIONER

## 2020-02-05 PROCEDURE — 6360000002 HC RX W HCPCS: Performed by: INTERNAL MEDICINE

## 2020-02-05 PROCEDURE — 6370000000 HC RX 637 (ALT 250 FOR IP): Performed by: FAMILY MEDICINE

## 2020-02-05 PROCEDURE — 85025 COMPLETE CBC W/AUTO DIFF WBC: CPT

## 2020-02-05 PROCEDURE — 6360000002 HC RX W HCPCS: Performed by: FAMILY MEDICINE

## 2020-02-05 PROCEDURE — 2580000003 HC RX 258: Performed by: FAMILY MEDICINE

## 2020-02-05 PROCEDURE — 80053 COMPREHEN METABOLIC PANEL: CPT

## 2020-02-05 PROCEDURE — 1200000000 HC SEMI PRIVATE

## 2020-02-05 PROCEDURE — 36415 COLL VENOUS BLD VENIPUNCTURE: CPT

## 2020-02-05 PROCEDURE — 6370000000 HC RX 637 (ALT 250 FOR IP): Performed by: INTERNAL MEDICINE

## 2020-02-05 RX ORDER — CEFDINIR 300 MG/1
300 CAPSULE ORAL 2 TIMES DAILY
Qty: 10 CAPSULE | Refills: 0 | Status: SHIPPED | OUTPATIENT
Start: 2020-02-05 | End: 2020-02-06 | Stop reason: SDUPTHER

## 2020-02-05 RX ADMIN — GABAPENTIN 300 MG: 300 CAPSULE ORAL at 14:39

## 2020-02-05 RX ADMIN — HYDRALAZINE HYDROCHLORIDE 50 MG: 50 TABLET, FILM COATED ORAL at 14:39

## 2020-02-05 RX ADMIN — SODIUM BICARBONATE 1300 MG: 650 TABLET ORAL at 11:12

## 2020-02-05 RX ADMIN — OXYCODONE HYDROCHLORIDE AND ACETAMINOPHEN 1 TABLET: 5; 325 TABLET ORAL at 05:08

## 2020-02-05 RX ADMIN — CALCIUM ACETATE 1334 MG: 667 CAPSULE ORAL at 12:50

## 2020-02-05 RX ADMIN — HEPARIN SODIUM 5000 UNITS: 10000 INJECTION INTRAVENOUS; SUBCUTANEOUS at 09:56

## 2020-02-05 RX ADMIN — EPOETIN ALFA-EPBX 4000 UNITS: 4000 INJECTION, SOLUTION INTRAVENOUS; SUBCUTANEOUS at 09:55

## 2020-02-05 RX ADMIN — ONDANSETRON HYDROCHLORIDE 4 MG: 2 SOLUTION INTRAMUSCULAR; INTRAVENOUS at 21:22

## 2020-02-05 RX ADMIN — HYDRALAZINE HYDROCHLORIDE 50 MG: 50 TABLET, FILM COATED ORAL at 21:22

## 2020-02-05 RX ADMIN — GABAPENTIN 300 MG: 300 CAPSULE ORAL at 09:58

## 2020-02-05 RX ADMIN — GABAPENTIN 300 MG: 300 CAPSULE ORAL at 21:22

## 2020-02-05 RX ADMIN — SODIUM BICARBONATE 1300 MG: 650 TABLET ORAL at 21:22

## 2020-02-05 RX ADMIN — HEPARIN SODIUM 5000 UNITS: 10000 INJECTION INTRAVENOUS; SUBCUTANEOUS at 17:20

## 2020-02-05 RX ADMIN — CALCIUM ACETATE 1334 MG: 667 CAPSULE ORAL at 09:54

## 2020-02-05 RX ADMIN — AMLODIPINE BESYLATE 10 MG: 10 TABLET ORAL at 09:53

## 2020-02-05 RX ADMIN — HYDRALAZINE HYDROCHLORIDE 50 MG: 50 TABLET, FILM COATED ORAL at 09:54

## 2020-02-05 RX ADMIN — CEFTRIAXONE SODIUM 1 G: 1 INJECTION, POWDER, FOR SOLUTION INTRAMUSCULAR; INTRAVENOUS at 09:55

## 2020-02-05 RX ADMIN — CALCIUM ACETATE 1334 MG: 667 CAPSULE ORAL at 18:59

## 2020-02-05 RX ADMIN — CALCITRIOL 0.25 MCG: 0.25 CAPSULE ORAL at 09:53

## 2020-02-05 ASSESSMENT — PAIN SCALES - GENERAL
PAINLEVEL_OUTOF10: 0
PAINLEVEL_OUTOF10: 8
PAINLEVEL_OUTOF10: 2
PAINLEVEL_OUTOF10: 0

## 2020-02-05 ASSESSMENT — PAIN SCALES - WONG BAKER: WONGBAKER_NUMERICALRESPONSE: 0

## 2020-02-05 NOTE — PROGRESS NOTES
Subjective: The patient is awake and alert. No problems overnight. Denies chest pain, angina, and dyspnea. Denies abdominal pain. Tolerating diet. No nausea or vomiting. Objective:    BP (!) 145/63   Pulse 75   Temp 98.4 °F (36.9 °C) (Temporal)   Resp 18   Wt 258 lb (117 kg)   SpO2 95%   BMI 47.19 kg/m²     Heart:  RRR, no murmurs, gallops, or rubs.   Lungs:  CTA bilaterally, no wheeze, rales or rhonchi  Abd: bowel sounds present, nontender, nondistended, no masses  Extrem:  No clubbing, cyanosis, or edema    CBC with Differential:    Lab Results   Component Value Date    WBC 7.5 02/05/2020    RBC 2.62 02/05/2020    HGB 7.7 02/05/2020    HCT 26.2 02/05/2020     02/05/2020    .0 02/05/2020    MCH 29.4 02/05/2020    MCHC 29.4 02/05/2020    RDW 14.1 02/05/2020    SEGSPCT 71 08/16/2013    BANDSPCT 1 03/29/2016    METASPCT 1 07/03/2011    LYMPHOPCT 13.8 02/05/2020    MONOPCT 11.8 02/05/2020    MYELOPCT 1 06/26/2011    BASOPCT 0.5 02/05/2020    MONOSABS 0.89 02/05/2020    LYMPHSABS 1.04 02/05/2020    EOSABS 0.26 02/05/2020    BASOSABS 0.04 02/05/2020     CMP:    Lab Results   Component Value Date     02/05/2020    K 5.0 02/05/2020    K 5.3 02/01/2020     02/05/2020    CO2 15 02/05/2020    BUN 43 02/05/2020    CREATININE 4.3 02/05/2020    GFRAA 12 02/05/2020    LABGLOM 10 02/05/2020    GLUCOSE 140 02/05/2020    GLUCOSE 149 10/12/2011    PROT 4.7 02/05/2020    LABALBU 1.9 02/05/2020    LABALBU 4.1 10/12/2011    CALCIUM 8.2 02/05/2020    BILITOT <0.2 02/05/2020    ALKPHOS 69 02/05/2020    AST 7 02/05/2020    ALT 5 02/05/2020        Assessment:    Patient Active Problem List   Diagnosis    Neurologic gait dysfunction    [facility]al failure, acute on chronic [facility]    Diabetes mellitus ([facility]    Hypertension    Arthritis    Kidney disease    Knee problem    Anemia due to stage 3 chronic kidney disease (HCC)    PERLA (acute kidney injury) ([facility]    Primary osteoarthritis of both knees  Acute on chronic renal insufficiency    Acute renal failure (HCC)    Acute cystitis without hematuria    Hyperkalemia    Metabolic acidosis    Elevated troponin       Plan:  Possible D/C in         Douglas Whitmore  8:48 AM  2/5/2020

## 2020-02-05 NOTE — CARE COORDINATION
Discharge order is in but family med wrote order, Hold discharge topday creatinine 4.3. met with patient in room to discuss transition of care. Plan remains home with [facility]. Still need home health care orders for CPA, med compliance and PT/OT prior to discharge. Patient said either family or friends will provide transportation home at time of discharge.   Cheryl Jain RN CM

## 2020-02-05 NOTE — ADT AUTH CERT
Renal Failure, Acute - Care Day 4 (2/3/2020) by Carolina Rosales RN         Review Status Review Entered   Completed 2/5/2020 14:51       Criteria Review      Care Day: 4 Care Date: 2/3/2020 Level of Care:    Guideline Day 2    Level Of Care    (X) Floor    Clinical Status    ( ) * Electrolyte abnormalities absent or improved    2/5/2020 2:51 PM EST by Nikki Mayfield      Chloride 117  Calcium 8.1  H&H 8.4/28.6    ( ) * Acid-base abnormalities absent or improved    2/5/2020 2:51 PM EST by Nikki Mayfield      CO2 15    (X) * Hypotension absent    2/5/2020 2:51 PM EST by Melani Sifuentes      98.4 Temporal- 97- 18  /60  97% on RA    Activity    (X) Activity as tolerated    Routes    (X) Parenteral or oral hydration    2/5/2020 2:51 PM EST by Nikki Mayfield      IVF 75 ml/hr    (X) Parenteral or oral medications    2/5/2020 2:51 PM EST by Melani Sifuentes      Sodium bicarbonate, Apresoline, Heparin, Neurontin, Retacrit SC, Phoslo, Norvasc, Calcitriol    (X) Renal diet as tolerated    Interventions    (X) Possible dialysis    (X) Monitor electrolytes, renal function tests, acid-base, and volume status    2/5/2020 2:51 PM EST by Melani Sifuentes      BUN 52  Creatinine 5.0  GFR 8    Medications    (X) Possible medical therapies    2/5/2020 2:51 PM EST by Nikki Mayfield      Zofran 4 mg IV q6h prn x1  Percocet 1 tab PO q6h prn x3  Rocephin IVPB q24h    * Milestone   Additional Notes   2/3/20 [facility]      Internal Medicine Plan:   Plan:   Continue IV       Nephrology Plan:   She is complaining of nausea this am, feels very nervous      1.  Chronic kidney disease at stage 5 (recent baseline scr 3.1 - 3.4) w/ Hx sarcoid   Now above recent baseline in setting of mild dehydration and uti   She remains non oliguric with nausea this am.    gentle fluids; Creatinine 5.6-5.6-5.1-5.0   follow strict I/Os and daily labs   Cr starting to come down, will follow on IVF for Medicine Note:   On rocephin emperically for uti.  Culture 100,000 gm - rods pending      1.  Acute on chronic ckd                K+ 5.0  Bun/cr 56/5.6 ,   Baseline cr 3       2.  ckd 5       3.  uti               Culture                Rocephin                        Iv hydration       4.  Hx nephrolithiasis               bilateral nonobstructing stones          5.  Met acidosis               Due to ckd               On oral hoc3               Goal >20          Nephrology Note:   1. Chronic kidney disease at stage 5 (recent baseline scr 3.1 - 3.4) w/ Hx sarcoid   Now above recent baseline in setting of mild dehydration and uti   She remains non oliguric w/ no uremic s/s   gentle fluids   follow strict I/Os and daily labs   Cr starting to come down (initally had no iv access so wasn't getting ivf)       2. UTI   started on Rocephin   cultures pending       3. Hyperkalemia   mild in setting of ckd 5   Has been on Valtessa in recent past   follow       4. Metabolic acidosis   Due to ckd   Started oral hoc3   Goal >20       5. Anemia of ckd   Nl fe b12 fol heme   Start lorie        6.  HTN    follow on current meds  norvasc apresoline       7. secondary hyperparathryoidism   Follow on rocaltrol   Check pth   p 6.4, goal 3.5-5.5   Start phoslo

## 2020-02-05 NOTE — PROGRESS NOTES
[facility]  Nephrology Attending Progress Note  Sander Ames. Leyla Ruiz MD        SUBJECTIVE:     1/31: The pt is a 79 yo female with a pmh of CKD Stage 5 followed by Dr Nico Arthur of our practice . She presented to the er with urinary frequency, fatigue, and nasuea, and back pain. She was seen by us in the hospital in 7/2019 for arf on ckd in the setting of uti, hypoglycemia, lle cellulitis, and dehydration. Florinda Rosas has a pmh of djd, nephrolithiasis, sarcoid,  depresison, htn, anemia of ckd, ckd 5. Her labs now show na 142 k 5 co2 16 bun 56 cr 5.6, gfr 9, ca 8.1, alb 2.7, wbc 10.2, hgb 9.6, plt 144. Her baseline cr is 3 from 8/2019 with a gfr of 15. She has been started on rocephin and ivf. Imaging show bilateral nonobstructing stones. 2/1: no dysuria, cp sob    2/2: seen I nroom, feels good, no cp or sob. No dysuria    2/3/2020- awake and alert. She is complaining of nausea this am, feels very nervous. 2/4/2020- awake and alert. She is in no distress. Still with am nausea, likely in the setting of PERLA.     2/5/2020- awake and alert.  She is doing well today, possible discharge tomorrow, eating and drinking well will stop IVF    PROBLEM LIST:    Patient Active Problem List   Diagnosis    Neurologic gait dysfunction    Renal failure, acute on chronic ([address]    Diabetes mellitus ([address]    Hypertension    Arthritis    Kidney disease    Knee problem    Anemia due to stage 3 chronic kidney disease (HCC)    PERLA (acute kidney injury) ([address]    Primary osteoarthritis of both knees    Acute on chronic renal insufficiency    Acute renal failure (HCC)    Acute cystitis without hematuria    Hyperkalemia    Metabolic acidosis    Elevated troponin        PAST MEDICAL HISTORY:    Past Medical History:   Diagnosis Date    Arthritis     knees    Chronic knee pain     Depression     Diabetes mellitus ([address]     type 2    Gall stones     Hypertension     Kidney disease     Kidney stones     Obesity    

## 2020-02-05 NOTE — PLAN OF CARE
Problem: Urinary Elimination:  Goal: Signs and symptoms of infection will decrease  Description  Signs and symptoms of infection will decrease  Outcome: Met This Shift     Problem: Urinary Elimination:  Goal: Ability to reestablish a normal urinary elimination pattern will improve - after catheter removal  Description  Ability to reestablish a normal urinary elimination pattern will improve  Outcome: Met This Shift     Problem: Urinary Elimination:  Goal: Complications related to the disease process, condition or treatment will be avoided or minimized  Description  Complications related to the disease process, condition or treatment will be avoided or minimized  Outcome: Met This Shift     Problem: Falls - Risk of:  Goal: Will remain free from falls  Description  Will remain free from falls  2/5/2020 1251 by Rajani Molina RN  Outcome: Met This Shift     Problem: Falls - Risk of:  Goal: Absence of physical injury  Description  Absence of physical injury  Outcome: Met This Shift     Problem: Risk for Impaired Skin Integrity  Goal: Tissue integrity - skin and mucous membranes  Description  Structural intactness and normal physiological function of skin and  mucous membranes.   Outcome: Met This Shift     Problem: Tissue Perfusion - Renal, Altered:  Goal: Electrolytes within specified parameters  Description  Electrolytes within specified parameters  Outcome: Met This Shift     Problem: Tissue Perfusion - Renal, Altered:  Goal: Ability to achieve a balanced intake and output will improve  Description  Ability to achieve a balanced intake and output will improve  2/5/2020 1251 by Rajani Molina RN  Outcome: Met This Shift     Problem: Pain:  Goal: Pain level will decrease  Description  Pain level will decrease  Outcome: Ongoing     Problem: Pain:  Goal: Control of acute pain  Description  Control of acute pain  Outcome: Ongoing     Problem: Pain:  Goal: Control of chronic pain  Description  Control of chronic pain  Outcome: Ongoing     Problem: Tissue Perfusion - Renal, Altered:  Goal: Urine creatinine clearance will be within specified parameters  Description  Urine creatinine clearance will be within specified parameters  Outcome: Ongoing     Problem: Tissue Perfusion - Renal, Altered:  Goal: Serum creatinine will be within specified parameters  Description  Serum creatinine will be within specified parameters  2/5/2020 0014 by Omar Marquez RN  Outcome: Not Met This Shift

## 2020-02-06 ENCOUNTER — TELEPHONE (OUTPATIENT)
Dept: CARDIOLOGY CLINIC | Age: 70
End: 2020-02-06

## 2020-02-06 VITALS
OXYGEN SATURATION: 97 % | RESPIRATION RATE: 18 BRPM | TEMPERATURE: 98.2 F | WEIGHT: 258 LBS | BODY MASS INDEX: 47.19 KG/M2 | DIASTOLIC BLOOD PRESSURE: 64 MMHG | HEART RATE: 73 BPM | SYSTOLIC BLOOD PRESSURE: 139 MMHG

## 2020-02-06 LAB
ALBUMIN SERPL-MCNC: 1.9 G/DL (ref 3.5–5.2)
ALP BLD-CCNC: 74 U/L (ref 35–104)
ALT SERPL-CCNC: 6 U/L (ref 0–32)
ANION GAP SERPL CALCULATED.3IONS-SCNC: 11 MMOL/L (ref 7–16)
AST SERPL-CCNC: 11 U/L (ref 0–31)
BASOPHILS ABSOLUTE: 0.04 E9/L (ref 0–0.2)
BASOPHILS RELATIVE PERCENT: 0.5 % (ref 0–2)
BILIRUB SERPL-MCNC: <0.2 MG/DL (ref 0–1.2)
BUN BLDV-MCNC: 43 MG/DL (ref 8–23)
CALCIUM SERPL-MCNC: 8.4 MG/DL (ref 8.6–10.2)
CHLORIDE BLD-SCNC: 119 MMOL/L (ref 98–107)
CO2: 13 MMOL/L (ref 22–29)
CREAT SERPL-MCNC: 4.2 MG/DL (ref 0.5–1)
EOSINOPHILS ABSOLUTE: 0.25 E9/L (ref 0.05–0.5)
EOSINOPHILS RELATIVE PERCENT: 3.1 % (ref 0–6)
GFR AFRICAN AMERICAN: 13
GFR NON-AFRICAN AMERICAN: 10 ML/MIN/1.73
GLUCOSE BLD-MCNC: 131 MG/DL (ref 74–99)
HCT VFR BLD CALC: 25.8 % (ref 34–48)
HEMOGLOBIN: 7.6 G/DL (ref 11.5–15.5)
IMMATURE GRANULOCYTES #: 0.34 E9/L
IMMATURE GRANULOCYTES %: 4.2 % (ref 0–5)
LYMPHOCYTES ABSOLUTE: 1.3 E9/L (ref 1.5–4)
LYMPHOCYTES RELATIVE PERCENT: 16.2 % (ref 20–42)
MCH RBC QN AUTO: 29.2 PG (ref 26–35)
MCHC RBC AUTO-ENTMCNC: 29.5 % (ref 32–34.5)
MCV RBC AUTO: 99.2 FL (ref 80–99.9)
MONOCYTES ABSOLUTE: 0.89 E9/L (ref 0.1–0.95)
MONOCYTES RELATIVE PERCENT: 11.1 % (ref 2–12)
NEUTROPHILS ABSOLUTE: 5.19 E9/L (ref 1.8–7.3)
NEUTROPHILS RELATIVE PERCENT: 64.9 % (ref 43–80)
PDW BLD-RTO: 13.8 FL (ref 11.5–15)
PLATELET # BLD: 237 E9/L (ref 130–450)
PMV BLD AUTO: 10.1 FL (ref 7–12)
POTASSIUM SERPL-SCNC: 4.9 MMOL/L (ref 3.5–5)
RBC # BLD: 2.6 E12/L (ref 3.5–5.5)
SODIUM BLD-SCNC: 143 MMOL/L (ref 132–146)
TOTAL PROTEIN: 4.9 G/DL (ref 6.4–8.3)
WBC # BLD: 8 E9/L (ref 4.5–11.5)

## 2020-02-06 PROCEDURE — 85025 COMPLETE CBC W/AUTO DIFF WBC: CPT

## 2020-02-06 PROCEDURE — 6360000002 HC RX W HCPCS: Performed by: FAMILY MEDICINE

## 2020-02-06 PROCEDURE — 6370000000 HC RX 637 (ALT 250 FOR IP): Performed by: FAMILY MEDICINE

## 2020-02-06 PROCEDURE — 80053 COMPREHEN METABOLIC PANEL: CPT

## 2020-02-06 PROCEDURE — 2580000003 HC RX 258: Performed by: FAMILY MEDICINE

## 2020-02-06 PROCEDURE — 6370000000 HC RX 637 (ALT 250 FOR IP): Performed by: INTERNAL MEDICINE

## 2020-02-06 PROCEDURE — 6370000000 HC RX 637 (ALT 250 FOR IP): Performed by: NURSE PRACTITIONER

## 2020-02-06 PROCEDURE — 36415 COLL VENOUS BLD VENIPUNCTURE: CPT

## 2020-02-06 RX ORDER — CEFDINIR 300 MG/1
300 CAPSULE ORAL 2 TIMES DAILY
Qty: 10 CAPSULE | Refills: 0 | Status: SHIPPED | OUTPATIENT
Start: 2020-02-06 | End: 2020-02-11

## 2020-02-06 RX ADMIN — CALCIUM ACETATE 1334 MG: 667 CAPSULE ORAL at 08:30

## 2020-02-06 RX ADMIN — CALCIUM ACETATE 1334 MG: 667 CAPSULE ORAL at 12:05

## 2020-02-06 RX ADMIN — ONDANSETRON HYDROCHLORIDE 4 MG: 2 SOLUTION INTRAMUSCULAR; INTRAVENOUS at 05:54

## 2020-02-06 RX ADMIN — OXYCODONE HYDROCHLORIDE AND ACETAMINOPHEN 1 TABLET: 5; 325 TABLET ORAL at 05:41

## 2020-02-06 RX ADMIN — CEFTRIAXONE SODIUM 1 G: 1 INJECTION, POWDER, FOR SOLUTION INTRAMUSCULAR; INTRAVENOUS at 08:32

## 2020-02-06 RX ADMIN — OXYCODONE HYDROCHLORIDE AND ACETAMINOPHEN 1 TABLET: 5; 325 TABLET ORAL at 13:20

## 2020-02-06 RX ADMIN — GABAPENTIN 300 MG: 300 CAPSULE ORAL at 08:31

## 2020-02-06 RX ADMIN — HYDRALAZINE HYDROCHLORIDE 50 MG: 50 TABLET, FILM COATED ORAL at 08:31

## 2020-02-06 RX ADMIN — CALCITRIOL 0.25 MCG: 0.25 CAPSULE ORAL at 08:31

## 2020-02-06 RX ADMIN — SODIUM BICARBONATE 1300 MG: 650 TABLET ORAL at 08:31

## 2020-02-06 RX ADMIN — AMLODIPINE BESYLATE 10 MG: 10 TABLET ORAL at 08:31

## 2020-02-06 RX ADMIN — HEPARIN SODIUM 5000 UNITS: 10000 INJECTION INTRAVENOUS; SUBCUTANEOUS at 08:32

## 2020-02-06 ASSESSMENT — PAIN SCALES - GENERAL
PAINLEVEL_OUTOF10: 5
PAINLEVEL_OUTOF10: 0
PAINLEVEL_OUTOF10: 8

## 2020-02-06 ASSESSMENT — PAIN SCALES - WONG BAKER: WONGBAKER_NUMERICALRESPONSE: 0

## 2020-02-06 NOTE — PROGRESS NOTES
Subjective: The patient is awake and alert. No problems overnight. Denies chest pain, angina, and dyspnea. Denies abdominal pain. Tolerating diet. No nausea or vomiting. Objective:    /64   Pulse 73   Temp 98.2 °F (36.8 °C) (Temporal)   Resp 18   Wt 258 lb (117 kg)   SpO2 97%   BMI 47.19 kg/m²     Heart:  RRR, no murmurs, gallops, or rubs.   Lungs:  CTA bilaterally, no wheeze, rales or rhonchi  Abd: bowel sounds present, nontender, nondistended, no masses  Extrem:  No clubbing, cyanosis, or edema    CBC with Differential:    Lab Results   Component Value Date    WBC 8.0 02/06/2020    RBC 2.60 02/06/2020    HGB 7.6 02/06/2020    HCT 25.8 02/06/2020     02/06/2020    MCV 99.2 02/06/2020    MCH 29.2 02/06/2020    MCHC 29.5 02/06/2020    RDW 13.8 02/06/2020    SEGSPCT 71 08/16/2013    BANDSPCT 1 03/29/2016    METASPCT 1 07/03/2011    LYMPHOPCT 16.2 02/06/2020    MONOPCT 11.1 02/06/2020    MYELOPCT 1 06/26/2011    BASOPCT 0.5 02/06/2020    MONOSABS 0.89 02/06/2020    LYMPHSABS 1.30 02/06/2020    EOSABS 0.25 02/06/2020    BASOSABS 0.04 02/06/2020     CMP:    Lab Results   Component Value Date     02/06/2020    K 4.9 02/06/2020    K 5.3 02/01/2020     02/06/2020    CO2 13 02/06/2020    BUN 43 02/06/2020    CREATININE 4.2 02/06/2020    GFRAA 13 02/06/2020    LABGLOM 10 02/06/2020    GLUCOSE 131 02/06/2020    GLUCOSE 149 10/12/2011    PROT 4.9 02/06/2020    LABALBU 1.9 02/06/2020    LABALBU 4.1 10/12/2011    CALCIUM 8.4 02/06/2020    BILITOT <0.2 02/06/2020    ALKPHOS 74 02/06/2020    AST 11 02/06/2020    ALT 6 02/06/2020        Assessment:    Patient Active Problem List   Diagnosis    Neurologic gait dysfunction    [facility], acute on chronic [facility]    Diabetes mellitus [facility]    Hypertension    Arthritis    Kidney disease    Knee problem    Anemia due to stage 3 chronic kidney disease (HCC)    PERLA (acute kidney injury) ([facility]    Primary osteoarthritis of both knees   

## 2020-02-06 NOTE — PROGRESS NOTES
Call placed to Dr. Balbina Cohen as patient is requesting zofran. Dr. Balbina Cohen stated he will call it in to [facility].

## 2020-02-06 NOTE — CARE COORDINATION
Discharge order in. Plan is home with [facility]. Voicemail message left for Myriam at [facility] to inform her that patient is discharging today and home health care orders are in. Patient's family or friends will transport her home today.   Rohini Flynn RN CM

## 2020-02-06 NOTE — PROGRESS NOTES
[facility]  Nephrology Attending Progress Note  Sivakumar Marlow. Ronell Mcardle, MD        SUBJECTIVE:     1/31: The pt is a 79 yo female with a pmh of CKD Stage 5 followed by Dr Ehsan Best of our practice . She presented to the er with urinary frequency, fatigue, and nasuea, and back pain. She was seen by us in the hospital in 7/2019 for arf on ckd in the setting of uti, hypoglycemia, lle cellulitis, and dehydration. Yadi Young has a pmh of djd, nephrolithiasis, sarcoid,  depresison, htn, anemia of ckd, ckd 5. Her labs now show na 142 k 5 co2 16 bun 56 cr 5.6, gfr 9, ca 8.1, alb 2.7, wbc 10.2, hgb 9.6, plt 144. Her baseline cr is 3 from 8/2019 with a gfr of 15. She has been started on rocephin and ivf. Imaging show bilateral nonobstructing stones. 2/1: no dysuria, cp sob    2/2: seen I nroom, feels good, no cp or sob. No dysuria    2/3/2020- awake and alert. She is complaining of nausea this am, feels very nervous. 2/4/2020- awake and alert. She is in no distress. Still with am nausea, likely in the setting of PERLA.     2/5/2020- awake and alert. She is doing well today, possible discharge tomorrow, eating and drinking well will stop IVF    2/6/2020- awake and alert. She is ready to go home today.      PROBLEM LIST:    Patient Active Problem List   Diagnosis    Neurologic gait dysfunction    Renal failure, acute on chronic ([address]    Diabetes mellitus ([address]    Hypertension    Arthritis    Kidney disease    Knee problem    Anemia due to stage 3 chronic kidney disease ([address]    PERLA (acute kidney injury) ([address]    Primary osteoarthritis of both knees    Acute on chronic renal insufficiency    Acute renal failure (HCC)    Acute cystitis without hematuria    Hyperkalemia    Metabolic acidosis    Elevated troponin        PAST MEDICAL HISTORY:    Past Medical History:   Diagnosis Date    Arthritis     knees    Chronic knee pain     Depression     Diabetes mellitus ([address]     type 2    Gall stones     Hypertension  Kidney disease     Kidney stones     Obesity     Sarcoidosis     SOBOE 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       DIET:    DIET RENAL;     PHYSICAL EXAM:     Patient Vitals for the past 24 hrs:   BP Temp Temp src Pulse Resp SpO2   02/06/20 0800 139/64 98.2 °F (36.8 °C) Temporal 73 18 --   02/05/20 2344 (!) 150/60 98.5 °F (36.9 °C) Oral 80 20 97 %   02/05/20 2120 (!) 172/72 -- -- -- -- --   02/05/20 1559 138/61 97.8 °F (36.6 °C) Temporal 83 18 --   02/05/20 1439 (!) 151/61 -- -- -- -- --   @      Intake/Output Summary (Last 24 hours) at 2/6/2020 1114  Last data filed at 2/5/2020 1855  Gross per 24 hour   Intake 480 ml   Output 800 ml   Net -320 ml         Wt Readings from Last 3 Encounters:   01/31/20 258 lb (117 kg)   08/13/19 258 lb (117 kg)   08/03/19 255 lb (115.7 kg)       Constitutional:  Pt is in no acute distress  Head: normocephalic, atraumatic  Neck: no JVD  Cardiovascular: regular rate and rhythm, no murmurs, gallops, or rubs  Respiratory: clear and equal bilaterally  Gastrointestinal:  Soft, nontender, nondistended, bowel sounds x 4  Ext: no edema  Skin: dry, no rash  Neuro: aaox3    MEDS (scheduled):    sodium bicarbonate  1,300 mg Oral BID    calcium acetate  1,334 mg Oral TID WC    epoetin derek-epbx  4,000 Units Subcutaneous Once per day on Mon Wed Fri    amLODIPine  10 mg Oral Daily    calcitRIOL  0.25 mcg Oral Daily    gabapentin  300 mg Oral TID    hydrALAZINE  50 mg Oral TID    heparin (porcine)  5,000 Units Subcutaneous Q8H    cefTRIAXone (ROCEPHIN) IV  1 g Intravenous Q24H       MEDS (infusions):      MEDS (prn):  ALPRAZolam, oxyCODONE-acetaminophen, magnesium hydroxide, ondansetron    DATA:    Recent Labs     02/04/20  0733 02/05/20  0739 02/06/20  0626   WBC 7.3 7.5 8.0   HGB 8.9* 7.7* 7.6*   HCT 30.0* 26.2* 25.8*   .3* 100.0* 99.2    203 237     Recent Labs     02/04/20  0733 02/05/20  0739 02/06/20  0626   NA 142 144 143   K 4.7 5.0 4.9   * 119* 119*   CO2 14* 15* 13*   BUN 44* 43* 43*   CREATININE 4.3* 4.3* 4.2*   LABGLOM 10 10 10   GLUCOSE 148* 140* 131*   CALCIUM 8.1* 8.2* 8.4*   ALT 6 5 6   AST 9 7 11   BILITOT <0.2 <0.2 <0.2   ALKPHOS 76 69 74   MG 1.7  --   --    PHOS 4.8*  --   --        Lab Results   Component Value Date    LABALBU 1.9 (L) 02/06/2020    LABALBU 1.9 (L) 02/05/2020    LABALBU 1.9 (L) 02/04/2020     Lab Results   Component Value Date    TSH 1.945 08/16/2013     No results found for: PHART, PO2ART, EAG0BEJ    Iron Studies:   Lab Results   Component Value Date    IRON 120 05/02/2019    TIBC 246 (L) 05/02/2019    FERRITIN 206 02/01/2020         IMPRESSION/RECOMMENDATIONS:      1. Chronic kidney disease at stage 5 (recent baseline scr 3.1 - 3.4) w/ Hx sarcoid  Now above recent baseline in setting of mild dehydration and uti  She remains non oliguric with nausea this am.   gentle fluids; Creatinine 5.6-5.6-5.1-5.0-4.3-4.2  follow strict I/Os and daily labs  Cr seems to have hit  [facility], will follow as OP     2. UTI- + urine culture for GNR  on Rocephin       3. Hyperkalemia  mild in setting of ckd 5  Has been on Valtessa in recent past  Stable.      4. Metabolic acidosis  Due to ckd; CO2 44-46-88-15-14-15-13. Started oral hoc3, increased 2/4/2020  Goal >20     5. Anemia of ckd  Nl fe b12 fol heme  Started ARUN     6.  HTN   follow on current meds  norvasc apresoline     7. secondary hyperparathryoidism  Follow on rocaltrol  Check pth  PO4- 4.8, goal 3.5-5.5  In goal.     Stable for discharge from a renal point of view    Jasvir Dates, APRN - CNP

## 2020-03-06 PROBLEM — R77.8 ELEVATED TROPONIN: Status: RESOLVED | Noted: 2020-02-02 | Resolved: 2020-03-06

## 2020-03-06 PROBLEM — R79.89 ELEVATED TROPONIN: Status: RESOLVED | Noted: 2020-02-02 | Resolved: 2020-03-06

## 2020-03-31 NOTE — DISCHARGE SUMMARY
Admit Date: 1/31/2020  9:53 AM   Discharge Date: 2/6/2020    Patient Active Problem List   Diagnosis    Neurologic gait dysfunction    Renal failure, acute on chronic (HCC)    Diabetes mellitus ([facility]    Hypertension    Arthritis    Kidney disease    Knee problem    Anemia due to stage 3 chronic kidney disease (HCC)    PERLA (acute kidney injury) ([facility]    Primary osteoarthritis of both knees    Acute on chronic renal insufficiency    Acute renal failure (HCC)    Acute cystitis without hematuria    Hyperkalemia    Metabolic acidosis        Present on Admission:   Acute on chronic renal insufficiency   Acute renal failure (HCC)   Anemia due to stage 3 chronic kidney disease (HCC)   Diabetes mellitus ([facility]   Hypertension   Acute cystitis without hematuria   Hyperkalemia   Metabolic acidosis   (Resolved) Elevated troponin        Ruthie Elsudhajuli TOM   Home Medication Instructions DUD:[id number]    Printed on:03/31/20 8911   Medication Information                      ALPRAZolam (XANAX) 0.25 MG tablet  Take 0.25 mg by mouth nightly as needed for Sleep. amLODIPine (NORVASC) 10 MG tablet  Take 10 mg by mouth daily             calcitRIOL (ROCALTROL) 0.25 MCG capsule  Take 1 capsule by mouth daily             calcium acetate (PHOSLO) 667 MG capsule  Take 2 capsules by mouth 3 times daily (with meals)             gabapentin (NEURONTIN) 300 MG capsule  Take 1 capsule by mouth 3 times daily             hydrALAZINE (APRESOLINE) 50 MG tablet  Take 1 tablet by mouth 3 times daily             oxyCODONE-acetaminophen (PERCOCET) 5-325 MG per tablet  Take 1 tablet by mouth 2 times daily as needed for Pain. Hospital Course/Procedures:78year-old with diabetes mellitus and chronic kidney disease and severe osteoarthritis of both knees presented to the emergency room on 1/31/2020 with increasing fatigue and back pain nausea and urinary frequency.   She was found to have a creatinine of 5.6 which was above her baseline of 3.0. Patient was admitted for acute on chronic renal failure. She was placed on IV fluids and empiric antibiotics including Rocephin. Urine and blood cultures were sent. Nephrology was consulted. Ultrasound showed no obstruction. Patient's renal function slowly improved back her baseline. Patient was switched from IV to oral antibiotics for her UTI and discharged back home in stable condition on 2/6/2020.     Consultants Following: nephrology    Disposition:home    Follow-up:she will follow up with her PCP      Francine Murguia  3/31/2020  3:01 PM

## 2020-04-11 ENCOUNTER — APPOINTMENT (OUTPATIENT)
Dept: GENERAL RADIOLOGY | Age: 70
DRG: 683 | End: 2020-04-11
Payer: MEDICARE

## 2020-04-11 ENCOUNTER — APPOINTMENT (OUTPATIENT)
Dept: ULTRASOUND IMAGING | Age: 70
DRG: 683 | End: 2020-04-11
Payer: MEDICARE

## 2020-04-11 ENCOUNTER — APPOINTMENT (OUTPATIENT)
Dept: CT IMAGING | Age: 70
DRG: 683 | End: 2020-04-11
Payer: MEDICARE

## 2020-04-11 ENCOUNTER — HOSPITAL ENCOUNTER (INPATIENT)
Age: 70
LOS: 4 days | Discharge: NURSING, REHAB OR LONG-TERM CARE | DRG: 683 | End: 2020-04-15
Attending: EMERGENCY MEDICINE | Admitting: FAMILY MEDICINE
Payer: MEDICARE

## 2020-04-11 PROBLEM — N18.9 ACUTE RENAL FAILURE SUPERIMPOSED ON CHRONIC KIDNEY DISEASE, ON CHRONIC DIALYSIS (HCC): Status: ACTIVE | Noted: 2020-04-11

## 2020-04-11 PROBLEM — Z99.2 ACUTE RENAL FAILURE SUPERIMPOSED ON CHRONIC KIDNEY DISEASE, ON CHRONIC DIALYSIS (HCC): Status: ACTIVE | Noted: 2020-04-11

## 2020-04-11 PROBLEM — N17.9 ACUTE RENAL FAILURE SUPERIMPOSED ON CHRONIC KIDNEY DISEASE, ON CHRONIC DIALYSIS (HCC): Status: ACTIVE | Noted: 2020-04-11

## 2020-04-11 PROBLEM — N18.6 ACUTE RENAL FAILURE SUPERIMPOSED ON CHRONIC KIDNEY DISEASE, ON CHRONIC DIALYSIS (HCC): Status: ACTIVE | Noted: 2020-04-11

## 2020-04-11 LAB
ANION GAP SERPL CALCULATED.3IONS-SCNC: 13 MMOL/L (ref 7–16)
ANISOCYTOSIS: ABNORMAL
BACTERIA: ABNORMAL /HPF
BASOPHILS ABSOLUTE: 0.13 E9/L (ref 0–0.2)
BASOPHILS RELATIVE PERCENT: 0.9 % (ref 0–2)
BILIRUBIN URINE: NEGATIVE
BLOOD, URINE: NEGATIVE
BUN BLDV-MCNC: 70 MG/DL (ref 8–23)
BURR CELLS: ABNORMAL
CALCIUM SERPL-MCNC: 9.2 MG/DL (ref 8.6–10.2)
CHLORIDE BLD-SCNC: 116 MMOL/L (ref 98–107)
CLARITY: ABNORMAL
CO2: 13 MMOL/L (ref 22–29)
COLOR: YELLOW
CREAT SERPL-MCNC: 4.8 MG/DL (ref 0.5–1)
EOSINOPHILS ABSOLUTE: 0 E9/L (ref 0.05–0.5)
EOSINOPHILS RELATIVE PERCENT: 0.9 % (ref 0–6)
EPITHELIAL CELLS, UA: ABNORMAL /HPF
GFR AFRICAN AMERICAN: 11
GFR NON-AFRICAN AMERICAN: 9 ML/MIN/1.73
GLUCOSE BLD-MCNC: 136 MG/DL (ref 74–99)
GLUCOSE URINE: NEGATIVE MG/DL
HCT VFR BLD CALC: 30.2 % (ref 34–48)
HEMOGLOBIN: 8.9 G/DL (ref 11.5–15.5)
KETONES, URINE: NEGATIVE MG/DL
LEUKOCYTE ESTERASE, URINE: ABNORMAL
LYMPHOCYTES ABSOLUTE: 0.15 E9/L (ref 1.5–4)
LYMPHOCYTES RELATIVE PERCENT: 0.9 % (ref 20–42)
MCH RBC QN AUTO: 30.3 PG (ref 26–35)
MCHC RBC AUTO-ENTMCNC: 29.5 % (ref 32–34.5)
MCV RBC AUTO: 102.7 FL (ref 80–99.9)
MONOCYTES ABSOLUTE: 0.89 E9/L (ref 0.1–0.95)
MONOCYTES RELATIVE PERCENT: 6.1 % (ref 2–12)
NEUTROPHILS ABSOLUTE: 13.62 E9/L (ref 1.8–7.3)
NEUTROPHILS RELATIVE PERCENT: 92.1 % (ref 43–80)
NITRITE, URINE: NEGATIVE
OVALOCYTES: ABNORMAL
PDW BLD-RTO: 16.2 FL (ref 11.5–15)
PH UA: 5 (ref 5–9)
PLATELET # BLD: 176 E9/L (ref 130–450)
PMV BLD AUTO: 10.9 FL (ref 7–12)
POIKILOCYTES: ABNORMAL
POTASSIUM SERPL-SCNC: 5.6 MMOL/L (ref 3.5–5)
PROTEIN UA: >=300 MG/DL
RBC # BLD: 2.94 E12/L (ref 3.5–5.5)
RBC UA: ABNORMAL /HPF (ref 0–2)
SODIUM BLD-SCNC: 142 MMOL/L (ref 132–146)
SPECIFIC GRAVITY UA: >=1.03 (ref 1–1.03)
TROPONIN: 0.12 NG/ML (ref 0–0.03)
UROBILINOGEN, URINE: 0.2 E.U./DL
WBC # BLD: 14.8 E9/L (ref 4.5–11.5)
WBC UA: ABNORMAL /HPF (ref 0–5)

## 2020-04-11 PROCEDURE — 2580000003 HC RX 258: Performed by: FAMILY MEDICINE

## 2020-04-11 PROCEDURE — 6370000000 HC RX 637 (ALT 250 FOR IP): Performed by: FAMILY MEDICINE

## 2020-04-11 PROCEDURE — 2140000000 HC CCU INTERMEDIATE R&B

## 2020-04-11 PROCEDURE — 71045 X-RAY EXAM CHEST 1 VIEW: CPT

## 2020-04-11 PROCEDURE — 6360000002 HC RX W HCPCS: Performed by: FAMILY MEDICINE

## 2020-04-11 PROCEDURE — 99222 1ST HOSP IP/OBS MODERATE 55: CPT | Performed by: FAMILY MEDICINE

## 2020-04-11 PROCEDURE — 36415 COLL VENOUS BLD VENIPUNCTURE: CPT

## 2020-04-11 PROCEDURE — 84484 ASSAY OF TROPONIN QUANT: CPT

## 2020-04-11 PROCEDURE — 6370000000 HC RX 637 (ALT 250 FOR IP): Performed by: INTERNAL MEDICINE

## 2020-04-11 PROCEDURE — 73560 X-RAY EXAM OF KNEE 1 OR 2: CPT

## 2020-04-11 PROCEDURE — 81001 URINALYSIS AUTO W/SCOPE: CPT

## 2020-04-11 PROCEDURE — 6370000000 HC RX 637 (ALT 250 FOR IP): Performed by: STUDENT IN AN ORGANIZED HEALTH CARE EDUCATION/TRAINING PROGRAM

## 2020-04-11 PROCEDURE — 76770 US EXAM ABDO BACK WALL COMP: CPT

## 2020-04-11 PROCEDURE — 2580000003 HC RX 258: Performed by: STUDENT IN AN ORGANIZED HEALTH CARE EDUCATION/TRAINING PROGRAM

## 2020-04-11 PROCEDURE — 85025 COMPLETE CBC W/AUTO DIFF WBC: CPT

## 2020-04-11 PROCEDURE — 70450 CT HEAD/BRAIN W/O DYE: CPT

## 2020-04-11 PROCEDURE — 99285 EMERGENCY DEPT VISIT HI MDM: CPT

## 2020-04-11 PROCEDURE — 6360000002 HC RX W HCPCS: Performed by: STUDENT IN AN ORGANIZED HEALTH CARE EDUCATION/TRAINING PROGRAM

## 2020-04-11 PROCEDURE — 80048 BASIC METABOLIC PNL TOTAL CA: CPT

## 2020-04-11 PROCEDURE — 93005 ELECTROCARDIOGRAM TRACING: CPT | Performed by: STUDENT IN AN ORGANIZED HEALTH CARE EDUCATION/TRAINING PROGRAM

## 2020-04-11 PROCEDURE — 72131 CT LUMBAR SPINE W/O DYE: CPT

## 2020-04-11 RX ORDER — SODIUM POLYSTYRENE SULFONATE 15 G/60ML
15 SUSPENSION ORAL; RECTAL ONCE
Status: COMPLETED | OUTPATIENT
Start: 2020-04-11 | End: 2020-04-11

## 2020-04-11 RX ORDER — ACETAMINOPHEN 325 MG/1
650 TABLET ORAL EVERY 6 HOURS PRN
Status: DISCONTINUED | OUTPATIENT
Start: 2020-04-11 | End: 2020-04-15 | Stop reason: HOSPADM

## 2020-04-11 RX ORDER — POLYETHYLENE GLYCOL 3350 17 G/17G
17 POWDER, FOR SOLUTION ORAL DAILY PRN
Status: DISCONTINUED | OUTPATIENT
Start: 2020-04-11 | End: 2020-04-15 | Stop reason: HOSPADM

## 2020-04-11 RX ORDER — PROMETHAZINE HYDROCHLORIDE 25 MG/1
12.5 TABLET ORAL EVERY 6 HOURS PRN
Status: DISCONTINUED | OUTPATIENT
Start: 2020-04-11 | End: 2020-04-11 | Stop reason: ALTCHOICE

## 2020-04-11 RX ORDER — CALCITRIOL 0.25 UG/1
0.25 CAPSULE, LIQUID FILLED ORAL DAILY
Status: DISCONTINUED | OUTPATIENT
Start: 2020-04-11 | End: 2020-04-15 | Stop reason: HOSPADM

## 2020-04-11 RX ORDER — OXYCODONE HYDROCHLORIDE AND ACETAMINOPHEN 5; 325 MG/1; MG/1
1 TABLET ORAL 2 TIMES DAILY PRN
Status: DISCONTINUED | OUTPATIENT
Start: 2020-04-11 | End: 2020-04-14

## 2020-04-11 RX ORDER — ONDANSETRON 2 MG/ML
4 INJECTION INTRAMUSCULAR; INTRAVENOUS EVERY 6 HOURS PRN
Status: DISCONTINUED | OUTPATIENT
Start: 2020-04-11 | End: 2020-04-11 | Stop reason: ALTCHOICE

## 2020-04-11 RX ORDER — SODIUM CHLORIDE 0.9 % (FLUSH) 0.9 %
10 SYRINGE (ML) INJECTION EVERY 12 HOURS SCHEDULED
Status: DISCONTINUED | OUTPATIENT
Start: 2020-04-11 | End: 2020-04-15 | Stop reason: HOSPADM

## 2020-04-11 RX ORDER — HYDRALAZINE HYDROCHLORIDE 50 MG/1
50 TABLET, FILM COATED ORAL 3 TIMES DAILY
Status: DISCONTINUED | OUTPATIENT
Start: 2020-04-11 | End: 2020-04-15 | Stop reason: HOSPADM

## 2020-04-11 RX ORDER — SODIUM CHLORIDE 0.9 % (FLUSH) 0.9 %
10 SYRINGE (ML) INJECTION PRN
Status: DISCONTINUED | OUTPATIENT
Start: 2020-04-11 | End: 2020-04-15 | Stop reason: HOSPADM

## 2020-04-11 RX ORDER — ACETAMINOPHEN 500 MG
1000 TABLET ORAL ONCE
Status: COMPLETED | OUTPATIENT
Start: 2020-04-11 | End: 2020-04-11

## 2020-04-11 RX ORDER — GABAPENTIN 300 MG/1
300 CAPSULE ORAL DAILY
Status: DISCONTINUED | OUTPATIENT
Start: 2020-04-11 | End: 2020-04-15 | Stop reason: HOSPADM

## 2020-04-11 RX ORDER — HEPARIN SODIUM 10000 [USP'U]/ML
5000 INJECTION, SOLUTION INTRAVENOUS; SUBCUTANEOUS EVERY 8 HOURS SCHEDULED
Status: DISCONTINUED | OUTPATIENT
Start: 2020-04-11 | End: 2020-04-15 | Stop reason: HOSPADM

## 2020-04-11 RX ORDER — ALPRAZOLAM 0.25 MG/1
0.25 TABLET ORAL NIGHTLY PRN
Status: DISCONTINUED | OUTPATIENT
Start: 2020-04-11 | End: 2020-04-15 | Stop reason: HOSPADM

## 2020-04-11 RX ORDER — AMLODIPINE BESYLATE 10 MG/1
10 TABLET ORAL DAILY
Status: DISCONTINUED | OUTPATIENT
Start: 2020-04-11 | End: 2020-04-15 | Stop reason: HOSPADM

## 2020-04-11 RX ORDER — 0.9 % SODIUM CHLORIDE 0.9 %
1000 INTRAVENOUS SOLUTION INTRAVENOUS ONCE
Status: COMPLETED | OUTPATIENT
Start: 2020-04-11 | End: 2020-04-11

## 2020-04-11 RX ORDER — SODIUM CHLORIDE 9 MG/ML
INJECTION, SOLUTION INTRAVENOUS CONTINUOUS
Status: DISCONTINUED | OUTPATIENT
Start: 2020-04-11 | End: 2020-04-12

## 2020-04-11 RX ORDER — ACETAMINOPHEN 650 MG/1
650 SUPPOSITORY RECTAL EVERY 6 HOURS PRN
Status: DISCONTINUED | OUTPATIENT
Start: 2020-04-11 | End: 2020-04-15 | Stop reason: HOSPADM

## 2020-04-11 RX ADMIN — CALCITRIOL 0.25 MCG: 0.25 CAPSULE ORAL at 17:48

## 2020-04-11 RX ADMIN — SODIUM CHLORIDE: 9 INJECTION, SOLUTION INTRAVENOUS at 17:47

## 2020-04-11 RX ADMIN — CEFTRIAXONE SODIUM 1 G: 1 INJECTION, POWDER, FOR SOLUTION INTRAMUSCULAR; INTRAVENOUS at 13:19

## 2020-04-11 RX ADMIN — GABAPENTIN 300 MG: 300 CAPSULE ORAL at 17:48

## 2020-04-11 RX ADMIN — SODIUM CHLORIDE, PRESERVATIVE FREE 10 ML: 5 INJECTION INTRAVENOUS at 21:00

## 2020-04-11 RX ADMIN — ACETAMINOPHEN 1000 MG: 500 TABLET ORAL at 10:28

## 2020-04-11 RX ADMIN — HEPARIN SODIUM 5000 UNITS: 10000 INJECTION INTRAVENOUS; SUBCUTANEOUS at 21:00

## 2020-04-11 RX ADMIN — HYDRALAZINE HYDROCHLORIDE 50 MG: 50 TABLET, FILM COATED ORAL at 20:59

## 2020-04-11 RX ADMIN — AMLODIPINE BESYLATE 10 MG: 10 TABLET ORAL at 17:48

## 2020-04-11 RX ADMIN — CALCIUM ACETATE 1334 MG: 667 CAPSULE ORAL at 21:00

## 2020-04-11 RX ADMIN — OXYCODONE HYDROCHLORIDE AND ACETAMINOPHEN 1 TABLET: 5; 325 TABLET ORAL at 17:48

## 2020-04-11 RX ADMIN — HYDRALAZINE HYDROCHLORIDE 50 MG: 50 TABLET, FILM COATED ORAL at 17:48

## 2020-04-11 RX ADMIN — SODIUM CHLORIDE 1000 ML: 9 INJECTION, SOLUTION INTRAVENOUS at 12:02

## 2020-04-11 RX ADMIN — SODIUM POLYSTYRENE SULFONATE 15 G: 15 SUSPENSION ORAL; RECTAL at 17:55

## 2020-04-11 ASSESSMENT — ENCOUNTER SYMPTOMS
NAUSEA: 0
TROUBLE SWALLOWING: 0
ABDOMINAL PAIN: 0
NAUSEA: 1
VOMITING: 0
CONSTIPATION: 0
EYES NEGATIVE: 1
CHOKING: 0
BLOOD IN STOOL: 0
SHORTNESS OF BREATH: 0
DIARRHEA: 0
ABDOMINAL DISTENTION: 0
VOMITING: 1
COUGH: 0
SORE THROAT: 0
COLOR CHANGE: 0
WHEEZING: 0
STRIDOR: 0
BACK PAIN: 1
VOICE CHANGE: 0

## 2020-04-11 ASSESSMENT — PAIN DESCRIPTION - DESCRIPTORS
DESCRIPTORS: ACHING;DISCOMFORT;SORE
DESCRIPTORS: ACHING

## 2020-04-11 ASSESSMENT — PAIN DESCRIPTION - PAIN TYPE
TYPE: ACUTE PAIN

## 2020-04-11 ASSESSMENT — PAIN DESCRIPTION - LOCATION
LOCATION: SHOULDER
LOCATION: RIB CAGE;SHOULDER
LOCATION: SHOULDER

## 2020-04-11 ASSESSMENT — PAIN DESCRIPTION - ORIENTATION
ORIENTATION: RIGHT

## 2020-04-11 ASSESSMENT — PAIN DESCRIPTION - ONSET: ONSET: GRADUAL

## 2020-04-11 ASSESSMENT — PAIN SCALES - GENERAL
PAINLEVEL_OUTOF10: 10
PAINLEVEL_OUTOF10: 6
PAINLEVEL_OUTOF10: 6
PAINLEVEL_OUTOF10: 8

## 2020-04-11 ASSESSMENT — PAIN DESCRIPTION - FREQUENCY: FREQUENCY: INTERMITTENT

## 2020-04-11 NOTE — H&P
unexpected weight change. HENT: Negative. Eyes: Negative. Respiratory: Negative for cough, choking, shortness of breath, wheezing and stridor. Cardiovascular: Positive for leg swelling. Chronic leg edema   Gastrointestinal: Positive for nausea and vomiting. Negative for abdominal distention, abdominal pain, constipation and diarrhea. Genitourinary: Negative for difficulty urinating. Musculoskeletal: Positive for arthralgias, back pain and myalgias. Skin:        Lichenified skin on legs   Neurological: Positive for weakness and light-headedness. Negative for tremors, seizures, syncope, facial asymmetry, speech difficulty, numbness and headaches. Psychiatric/Behavioral: Negative for self-injury and suicidal ideas. DATA:      Recent Labs     04/11/20  1035   WBC 14.8*   HGB 8.9*   HCT 30.2*   .7*        Recent Labs     04/11/20  1035      K 5.6*   *   CO2 13*   BUN 70*   CREATININE 4.8*   LABGLOM 9   CALCIUM 9.2     No results for input(s): ALT in the last 72 hours. Invalid input(s):  AST,  ALKPHOS,  BILITOT,  BILIDIR  INR: No results for input(s): INR in the last 72 hours. Invalid input(s): PT/INR    Lab Results   Component Value Date    CRP 0.7 (H) 08/13/2019     Lab Results   Component Value Date    SEDRATE 45 (H) 08/13/2019     No results for input(s): POCGLU in the last 72 hours. Lipids: No results for input(s): CHOL, HDL in the last 72 hours. Invalid input(s): LDLCALCU  ABGs: No results found for: PHART, PO2ART, YNI6MDT    Last 3 Troponin:    Lab Results   Component Value Date    TROPONINI 0.12 04/11/2020    TROPONINI 0.19 02/01/2020    TROPONINI 0.19 01/31/2020     TSH:    Lab Results   Component Value Date    TSH 1.945 08/16/2013        No results for input(s): POCGLU in the last 72 hours.     U/A:     Recent Labs     04/11/20  1146   COLORU Yellow   PHUR 5.0   WBCUA 10-20*   RBCUA 0-1   BACTERIA MANY*   CLARITYU SL CLOUDY   SPECGRAV >=1.030 LEUKOCYTESUR TRACE*   UROBILINOGEN 0.2   BILIRUBINUR Negative   BLOODU Negative   GLUCOSEU Negative          Iron studies:  Lab Results   Component Value Date    FERRITIN 206 02/01/2020     Bone disease:  Lab Results   Component Value Date     (H) 02/01/2020    MG 1.7 02/04/2020    PHOS 4.8 (H) 02/04/2020     Nutrition:No results found for: ALB    Wt Readings from Last 4 Encounters:   04/11/20 238 lb 3.2 oz (108 kg)   01/31/20 258 lb (117 kg)   08/13/19 258 lb (117 kg)   08/03/19 255 lb (115.7 kg)     Vitals:    04/11/20 1645   BP: 136/62   Pulse: 68   Resp: 16   Temp: 96.1 °F (35.6 °C)   SpO2: 99%       Physical exam[de-identified]               General appearance - oriented to person, place, and time and overweight  Mental status - alert, oriented to person, place, and time, normal mood, behavior, speech, dress, motor activity, and thought processes  Eyes - pupils equal and reactive, extraocular eye movements intact  ENeck - supple, no significant adenopathy  Chest - clear to auscultation, no wheezes, rales or rhonchi, symmetric air entry  Heart - normal rate, regular rhythm, normal S1, S2, no murmurs, rubs, clicks or gallops  Abdomen - soft, nontender, nondistended, no masses or organomegaly  Neurological - alert, oriented, normal speech, no focal findings or movement disorder noted}  Extremities - peripheral pulses normal, no pedal edema, no clubbing or cyanosis  Skin - lichenified skin bilat lower extremities.                          Imaging:      Assessment:  Patient Active Problem List   Diagnosis    Neurologic gait dysfunction    Renal failure, acute on chronic (HCC)    Diabetes mellitus ([address]    Hypertension    Arthritis    Kidney disease    Knee problem    Anemia due to stage 3 chronic kidney disease ([address]    PERLA (acute kidney injury) ([address]    Primary osteoarthritis of both knees    Acute on chronic renal insufficiency    Acute renal failure (HCC)    Acute cystitis without hematuria   

## 2020-04-11 NOTE — ED NOTES
Bed: 01  Expected date:   Expected time:   Means of arrival:   Comments:  jeyson Gagnon, RN  04/11/20 1000

## 2020-04-11 NOTE — ED PROVIDER NOTES
on the left side. Heart sounds: Normal heart sounds, S1 normal and S2 normal. Heart sounds not distant. No murmur. No friction rub. No gallop. No S3 or S4 sounds. Pulmonary:      Effort: Pulmonary effort is normal. No tachypnea, accessory muscle usage or respiratory distress. Breath sounds: Normal breath sounds. No stridor, decreased air movement or transmitted upper airway sounds. No decreased breath sounds, wheezing, rhonchi or rales. Chest:      Chest wall: Tenderness present. No mass, deformity, swelling, crepitus or edema. Abdominal:      General: Abdomen is flat. Bowel sounds are normal. There is no distension. Palpations: Abdomen is soft. There is no hepatomegaly, splenomegaly, mass or pulsatile mass. Tenderness: There is no abdominal tenderness. There is no right CVA tenderness, left CVA tenderness, guarding or rebound. Hernia: No hernia is present. Musculoskeletal:         General: No swelling, deformity or signs of injury. Right knee: She exhibits decreased range of motion (Secondary to pain), erythema and bony tenderness. She exhibits no swelling, no effusion, no ecchymosis, no deformity and no laceration. Tenderness (Generalized) found. Left knee: She exhibits decreased range of motion (Secondary to pain). She exhibits no swelling, no effusion, no ecchymosis, no deformity, no laceration and no erythema. Tenderness (Generalized) found. Right lower leg: Edema (1+ pitting) present. Left lower leg: Edema (1+ pitting) present. Comments: Bilateral knee tenderness which is chronic according to the patient, range of motion of the knee is slightly limited secondary to pain. Once again patient reports this is chronic   Lymphadenopathy:      Cervical: No cervical adenopathy. Skin:     General: Skin is warm and dry. Capillary Refill: Capillary refill takes less than 2 seconds. Coloration: Skin is not jaundiced or pale.       Findings: No encounter    3. Chronic pain of both knees    4. Hyperkalemia    5. General weakness        Disposition:  Patient's disposition: Admit to telemetry  Patient's condition is stable.               Toña Foley DO  Resident  04/11/20 6504

## 2020-04-11 NOTE — CONSULTS
Nephrology Consult  [facility]  Aki Gan MD    CC:   arf    HPI:   The pt radames 77 yo female with a pnh of djd, depression, dm, htm, ckd 4 followed by dr madrigal, sarcoidosis, nephrolithiasis, who presented with a fall and poly knee pain and djd. Her labs showed na 142, k 5.6, co2 13, bun 70, cr 4.8, ca 9.2, wbc 14.8, hgb 8.9, plt 176. She underwent a renal us which did not reveal hydro. Head ct was negative, and ct spine showed osteopenia and djhd L3-S1, and knee xrays showed djd. cxt was clear. She was started on ns at 75 ml/hr and on rocephin for possible uti. Her baseline cr from 2/2020 was 4.2., was 3.1 in 8/2019. She was here in 2/2020 for uti and arf with dehydration. Her cr peaked at 5.6 and she had poly nonobstructing stones.      PMH:    Past Medical History:   Diagnosis Date    Arthritis     knees    Chronic knee pain     Depression     Diabetes mellitus ([address]     type 2    Gall stones     Hypertension     Kidney disease     Kidney stones     Obesity     Sarcoidosis     SOBOE 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       Patient Active Problem List   Diagnosis    Neurologic gait dysfunction    Renal failure, acute on chronic (HCC)    Diabetes mellitus ([address]    Hypertension    Arthritis    Kidney disease    Knee problem    Anemia due to stage 3 chronic kidney disease (HCC)    PERLA (acute kidney injury) ([address]    Primary osteoarthritis of both knees    Acute on chronic renal insufficiency    Acute renal failure (HCC)    Acute cystitis without hematuria    Hyperkalemia    Metabolic acidosis    Acute renal failure superimposed on chronic kidney disease, on chronic dialysis (HCC)       Meds:     amLODIPine  10 mg Oral Daily    calcitRIOL  0.25 mcg Oral Daily    calcium acetate  1,334 mg Oral TID WC    gabapentin  300 mg Oral Daily    hydrALAZINE  50 mg Oral TID    sodium chloride flush  10 mL Intravenous 2 times per day Height Weight   04/11/20 1645 136/62 96.1 °F (35.6 °C) Oral 68 16 99 % 5' 1\" (1.549 m) 238 lb 3.2 oz (108 kg)   04/11/20 1421 (!) 140/57 98 °F (36.7 °C) Oral 65 16 99 % -- --   04/11/20 1319 (!) 117/51 -- -- 66 16 98 % -- --   04/11/20 1204 (!) 122/59 -- -- 68 13 97 % -- --   04/11/20 1008 (!) 145/101 96.6 °F (35.9 °C) Infrared 82 20 99 % 5' 1\" (1.549 m) 230 lb (104.3 kg)       No intake or output data in the 24 hours ending 04/11/20 1745    Constitutional: Patient in no acute distress   Head: normocephalic, atraumatic   Neck: supple, no jvd  Cardiovascular: regular rate and rhythm, no murmurs, gallops, or rubs   Respiratory: Clear, no rales, rhochi, or wheezes,   Gastrointestinal: soft, nontender, nondistended, no hepatosplenomegaly  Ext: no edema  Neuro: aaox3  Skin: dry, no rash   Back: nontender    Data:    Recent Labs     04/11/20  1035   WBC 14.8*   HGB 8.9*   HCT 30.2*   .7*          Recent Labs     04/11/20  1035      K 5.6*   *   CO2 13*   CREATININE 4.8*   BUN 70*   LABGLOM 9   GLUCOSE 136*   CALCIUM 9.2       Vit D, 25-Hydroxy   Date Value Ref Range Status   10/12/2011 13 (L) 30 - 80 ng/mL Final     Comment:     REFERENCE INTERVAL- Vitamin D, 25-Hydroxy    This assay accurately quantifies the sum of vitamin D3,  25-hydroxy and vitamin D2, 25-hydroxy. 0-17 years-  Deficiency- less than 20 ng/mL  Optimum level- greater than or equal to 20 ng/mL*  *(Papito CL et al. Pediatrics 2008- 122- 1142-52.)    18 years and older-  Deficiency- Less than 20 ng/mL  Insufficiency- 20-29 ng/mL  Optimum Level- 30-80 ng/mL  Possible Toxicity- Greater than 150 ng/mL       PTH   Date Value Ref Range Status   02/01/2020 336 (H) 15 - 65 pg/mL Final       No results for input(s): ALT, AST, ALKPHOS, BILITOT, BILIDIR in the last 72 hours. No results for input(s): LABALBU in the last 72 hours.     Ferritin   Date Value Ref Range Status   02/01/2020 206 ng/mL Final     Comment:     FERRITIN Reference Ranges:  Adult Males   20 - 60 yrars:    30 - 400 ng/mL  Adult females 17 - 60 years:    13 - 150 ng/mL  Adults greater than 60 years:   no established reference range  Pediatrics:                     no established reference range       Iron   Date Value Ref Range Status   05/02/2019 120 37 - 145 mcg/dL Final     TIBC   Date Value Ref Range Status   05/02/2019 246 (L) 250 - 450 mcg/dL Final       Vitamin B-12   Date Value Ref Range Status   02/01/2020 620 211 - 946 pg/mL Final       Folate   Date Value Ref Range Status   02/01/2020 4.6 (L) 4.8 - 24.2 ng/mL Final         Lab Results   Component Value Date    COLORU Yellow 04/11/2020    NITRU Negative 04/11/2020    GLUCOSEU Negative 04/11/2020    GLUCOSEU NEGATIVE 08/17/2011    KETUA Negative 04/11/2020    UROBILINOGEN 0.2 04/11/2020    BILIRUBINUR Negative 04/11/2020    BILIRUBINUR NEGATIVE 08/17/2011       No results found for: MAGDA, CREURRAN, MACREATRATIO, OSMOU    No components found for: URIC    No results found for: LIPIDPAN      Assessment and Plan:    1. arf on ckd 4  Baseline 4.2 in 2/2020, 3.1 in 8/2019  Now cr at 4.8  trial of hydration  jil without hydro, stones not seen on us but noted on ct spine  Daily bmp    2. uti  Send cx  On rocephin    3. Elevated pth/secondary hyperparathyrodism  Follow closely on vit d analogue due to sarcoid and h/o stones  Ca nl now  On rocaltrol  On phoslo    4. Hyperkalemia  Renal diet  Due to ckd    5. Metabolic acidosis  Start hco3  Due to ckd    6. htn  Follow on current  norvasc apresoline    7. djd  Avoid nsaids with ckd    Sunita Six.  Jerzy Griffiths MD

## 2020-04-12 ENCOUNTER — APPOINTMENT (OUTPATIENT)
Dept: GENERAL RADIOLOGY | Age: 70
DRG: 683 | End: 2020-04-12
Payer: MEDICARE

## 2020-04-12 LAB
ALBUMIN SERPL-MCNC: 2.2 G/DL (ref 3.5–5.2)
ALP BLD-CCNC: 93 U/L (ref 35–104)
ALT SERPL-CCNC: 19 U/L (ref 0–32)
ANION GAP SERPL CALCULATED.3IONS-SCNC: 13 MMOL/L (ref 7–16)
AST SERPL-CCNC: 18 U/L (ref 0–31)
BASOPHILS ABSOLUTE: 0.04 E9/L (ref 0–0.2)
BASOPHILS RELATIVE PERCENT: 0.2 % (ref 0–2)
BILIRUB SERPL-MCNC: <0.2 MG/DL (ref 0–1.2)
BUN BLDV-MCNC: 66 MG/DL (ref 8–23)
CALCIUM SERPL-MCNC: 8.6 MG/DL (ref 8.6–10.2)
CHLORIDE BLD-SCNC: 121 MMOL/L (ref 98–107)
CO2: 14 MMOL/L (ref 22–29)
CREAT SERPL-MCNC: 4.7 MG/DL (ref 0.5–1)
CREATININE URINE: 58 MG/DL (ref 29–226)
EOSINOPHILS ABSOLUTE: 0.3 E9/L (ref 0.05–0.5)
EOSINOPHILS RELATIVE PERCENT: 1.8 % (ref 0–6)
GFR AFRICAN AMERICAN: 11
GFR NON-AFRICAN AMERICAN: 9 ML/MIN/1.73
GLUCOSE BLD-MCNC: 125 MG/DL (ref 74–99)
HCT VFR BLD CALC: 31.3 % (ref 34–48)
HEMOGLOBIN: 9.1 G/DL (ref 11.5–15.5)
IMMATURE GRANULOCYTES #: 0.11 E9/L
IMMATURE GRANULOCYTES %: 0.7 % (ref 0–5)
LYMPHOCYTES ABSOLUTE: 0.77 E9/L (ref 1.5–4)
LYMPHOCYTES RELATIVE PERCENT: 4.7 % (ref 20–42)
MCH RBC QN AUTO: 29.4 PG (ref 26–35)
MCHC RBC AUTO-ENTMCNC: 29.1 % (ref 32–34.5)
MCV RBC AUTO: 101.3 FL (ref 80–99.9)
MONOCYTES ABSOLUTE: 0.94 E9/L (ref 0.1–0.95)
MONOCYTES RELATIVE PERCENT: 5.8 % (ref 2–12)
NEUTROPHILS ABSOLUTE: 14.13 E9/L (ref 1.8–7.3)
NEUTROPHILS RELATIVE PERCENT: 86.8 % (ref 43–80)
PDW BLD-RTO: 16.4 FL (ref 11.5–15)
PLATELET # BLD: 215 E9/L (ref 130–450)
PMV BLD AUTO: 11.1 FL (ref 7–12)
POTASSIUM REFLEX MAGNESIUM: 5.5 MMOL/L (ref 3.5–5)
RBC # BLD: 3.09 E12/L (ref 3.5–5.5)
SODIUM BLD-SCNC: 148 MMOL/L (ref 132–146)
SODIUM URINE: 82 MMOL/L
TOTAL PROTEIN: 5.6 G/DL (ref 6.4–8.3)
WBC # BLD: 16.3 E9/L (ref 4.5–11.5)

## 2020-04-12 PROCEDURE — 85025 COMPLETE CBC W/AUTO DIFF WBC: CPT

## 2020-04-12 PROCEDURE — 6360000002 HC RX W HCPCS: Performed by: FAMILY MEDICINE

## 2020-04-12 PROCEDURE — 84300 ASSAY OF URINE SODIUM: CPT

## 2020-04-12 PROCEDURE — 82570 ASSAY OF URINE CREATININE: CPT

## 2020-04-12 PROCEDURE — 2580000003 HC RX 258: Performed by: INTERNAL MEDICINE

## 2020-04-12 PROCEDURE — 80053 COMPREHEN METABOLIC PANEL: CPT

## 2020-04-12 PROCEDURE — 6370000000 HC RX 637 (ALT 250 FOR IP): Performed by: FAMILY MEDICINE

## 2020-04-12 PROCEDURE — 2580000003 HC RX 258: Performed by: FAMILY MEDICINE

## 2020-04-12 PROCEDURE — 36415 COLL VENOUS BLD VENIPUNCTURE: CPT

## 2020-04-12 PROCEDURE — 6370000000 HC RX 637 (ALT 250 FOR IP): Performed by: INTERNAL MEDICINE

## 2020-04-12 PROCEDURE — 73030 X-RAY EXAM OF SHOULDER: CPT

## 2020-04-12 PROCEDURE — 2140000000 HC CCU INTERMEDIATE R&B

## 2020-04-12 RX ORDER — SODIUM CHLORIDE 450 MG/100ML
INJECTION, SOLUTION INTRAVENOUS CONTINUOUS
Status: DISCONTINUED | OUTPATIENT
Start: 2020-04-12 | End: 2020-04-15 | Stop reason: HOSPADM

## 2020-04-12 RX ORDER — SODIUM BICARBONATE 650 MG/1
650 TABLET ORAL 2 TIMES DAILY
Status: DISCONTINUED | OUTPATIENT
Start: 2020-04-12 | End: 2020-04-14

## 2020-04-12 RX ORDER — SODIUM POLYSTYRENE SULFONATE 15 G/60ML
15 SUSPENSION ORAL; RECTAL ONCE
Status: COMPLETED | OUTPATIENT
Start: 2020-04-12 | End: 2020-04-12

## 2020-04-12 RX ADMIN — GABAPENTIN 300 MG: 300 CAPSULE ORAL at 10:03

## 2020-04-12 RX ADMIN — CALCITRIOL 0.25 MCG: 0.25 CAPSULE ORAL at 10:02

## 2020-04-12 RX ADMIN — OXYCODONE HYDROCHLORIDE AND ACETAMINOPHEN 1 TABLET: 5; 325 TABLET ORAL at 15:19

## 2020-04-12 RX ADMIN — HEPARIN SODIUM 5000 UNITS: 10000 INJECTION INTRAVENOUS; SUBCUTANEOUS at 22:09

## 2020-04-12 RX ADMIN — TRIMETHOBENZAMIDE HYDROCHLORIDE 200 MG: 100 INJECTION INTRAMUSCULAR at 01:07

## 2020-04-12 RX ADMIN — CALCIUM ACETATE 1334 MG: 667 CAPSULE ORAL at 16:53

## 2020-04-12 RX ADMIN — CALCIUM ACETATE 1334 MG: 667 CAPSULE ORAL at 12:06

## 2020-04-12 RX ADMIN — OXYCODONE HYDROCHLORIDE AND ACETAMINOPHEN 1 TABLET: 5; 325 TABLET ORAL at 22:15

## 2020-04-12 RX ADMIN — SODIUM POLYSTYRENE SULFONATE 15 G: 15 SUSPENSION ORAL; RECTAL at 13:52

## 2020-04-12 RX ADMIN — SODIUM CHLORIDE: 9 INJECTION, SOLUTION INTRAVENOUS at 10:34

## 2020-04-12 RX ADMIN — HYDRALAZINE HYDROCHLORIDE 50 MG: 50 TABLET, FILM COATED ORAL at 10:03

## 2020-04-12 RX ADMIN — SODIUM BICARBONATE 650 MG: 650 TABLET ORAL at 20:36

## 2020-04-12 RX ADMIN — HEPARIN SODIUM 5000 UNITS: 10000 INJECTION INTRAVENOUS; SUBCUTANEOUS at 13:52

## 2020-04-12 RX ADMIN — HYDRALAZINE HYDROCHLORIDE 50 MG: 50 TABLET, FILM COATED ORAL at 20:36

## 2020-04-12 RX ADMIN — HYDRALAZINE HYDROCHLORIDE 50 MG: 50 TABLET, FILM COATED ORAL at 13:52

## 2020-04-12 RX ADMIN — ALPRAZOLAM 0.25 MG: 0.25 TABLET ORAL at 20:36

## 2020-04-12 RX ADMIN — SODIUM CHLORIDE: 4.5 INJECTION, SOLUTION INTRAVENOUS at 15:01

## 2020-04-12 RX ADMIN — ACETAMINOPHEN 650 MG: 325 TABLET, FILM COATED ORAL at 01:11

## 2020-04-12 RX ADMIN — AMLODIPINE BESYLATE 10 MG: 10 TABLET ORAL at 10:02

## 2020-04-12 RX ADMIN — SODIUM BICARBONATE 650 MG: 650 TABLET ORAL at 13:52

## 2020-04-12 RX ADMIN — HEPARIN SODIUM 5000 UNITS: 10000 INJECTION INTRAVENOUS; SUBCUTANEOUS at 06:10

## 2020-04-12 RX ADMIN — CALCIUM ACETATE 1334 MG: 667 CAPSULE ORAL at 10:02

## 2020-04-12 ASSESSMENT — PAIN DESCRIPTION - ORIENTATION
ORIENTATION: RIGHT

## 2020-04-12 ASSESSMENT — PAIN DESCRIPTION - LOCATION
LOCATION: RIB CAGE;SHOULDER

## 2020-04-12 ASSESSMENT — PAIN SCALES - GENERAL
PAINLEVEL_OUTOF10: 7
PAINLEVEL_OUTOF10: 7
PAINLEVEL_OUTOF10: 8
PAINLEVEL_OUTOF10: 0
PAINLEVEL_OUTOF10: 9
PAINLEVEL_OUTOF10: 6
PAINLEVEL_OUTOF10: 4

## 2020-04-12 ASSESSMENT — PAIN DESCRIPTION - FREQUENCY
FREQUENCY: CONTINUOUS
FREQUENCY: INTERMITTENT
FREQUENCY: CONTINUOUS

## 2020-04-12 ASSESSMENT — PAIN DESCRIPTION - DESCRIPTORS
DESCRIPTORS: ACHING;DISCOMFORT;SORE

## 2020-04-12 ASSESSMENT — PAIN DESCRIPTION - PAIN TYPE
TYPE: ACUTE PAIN

## 2020-04-12 ASSESSMENT — PAIN DESCRIPTION - ONSET
ONSET: GRADUAL
ONSET: ON-GOING
ONSET: ON-GOING

## 2020-04-12 ASSESSMENT — PAIN DESCRIPTION - PROGRESSION
CLINICAL_PROGRESSION: NOT CHANGED
CLINICAL_PROGRESSION: NOT CHANGED
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
## 2020-04-12 NOTE — PROGRESS NOTES
RN spoke with Dr. Natalie Jones regarding pauses and patient's request for nausea medication. She will see the patient in the morning regarding the pauses and evaluate medications for possible adjustments. Orders obtained for Tigan 200mg IM Q6h PRN.

## 2020-04-12 NOTE — PROGRESS NOTES
[facility]  Nephrology Attending Progress Note  Yanci Khalil MD        SUBJECTIVE:     4/11: The pt radames 79 yo female with a pnh of djd, depression, dm, htm, ckd 4 followed by dr madrigal, sarcoidosis, nephrolithiasis, who presented with a fall and poly knee pain and djd. Her labs showed na 142, k 5.6, co2 13, bun 70, cr 4.8, ca 9.2, wbc 14.8, hgb 8.9, plt 176. She underwent a renal us which did not reveal hydro. Head ct was negative, and ct spine showed osteopenia and djhd L3-S1, and knee xrays showed djd. cxt was clear. She was started on ns at 75 ml/hr and on rocephin for possible uti. Her baseline cr from 2/2020 was 4.2., was 3.1 in 8/2019. She was here in 2/2020 for uti and arf with dehydration. Her cr peaked at 5.6 and she had poly nonobstructing stones.      4/12 :seen in room, c/o poly knee pain       PROBLEM LIST:    Patient Active Problem List   Diagnosis    Neurologic gait dysfunction    Renal failure, acute on chronic (HCC)    Diabetes mellitus ([address]    Hypertension    Arthritis    Kidney disease    Knee problem    Anemia due to stage 3 chronic kidney disease (HCC)    PERLA (acute kidney injury) ([address]    Primary osteoarthritis of both knees    Acute on chronic renal insufficiency    Acute renal failure (HCC)    Acute cystitis without hematuria    Hyperkalemia    Metabolic acidosis    Acute renal failure superimposed on chronic kidney disease, on chronic dialysis ([address]        PAST MEDICAL HISTORY:    Past Medical History:   Diagnosis Date    Arthritis     knees    Chronic knee pain     Depression     Diabetes mellitus (HCC)     type 2    Gall stones     Hypertension     Kidney disease     Kidney stones     Obesity     Sarcoidosis     SOBOE 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       DIET:    Dietary Nutrition Supplements: Low Calorie High Protein Supplement  DIET GENERAL; Low Potassium     PHYSICAL EXAM:     Patient Vitals for the past 24 hrs:   BP Temp Temp src Pulse Resp SpO2 Height Weight   04/12/20 0800 (!) 159/79 97.8 °F (36.6 °C) Temporal 79 16 97 % -- --   04/12/20 0020 138/65 96.4 °F (35.8 °C) Temporal 67 16 98 % -- --   04/11/20 2030 (!) 143/65 96 °F (35.6 °C) Temporal 63 16 99 % -- --   04/11/20 1645 136/62 96.1 °F (35.6 °C) Oral 68 16 99 % 5' 1\" (1.549 m) 238 lb 3.2 oz (108 kg)   04/11/20 1421 (!) 140/57 98 °F (36.7 °C) Oral 65 16 99 % -- --   04/11/20 1319 (!) 117/51 -- -- 66 16 98 % -- --   @      Intake/Output Summary (Last 24 hours) at 4/12/2020 1214  Last data filed at 4/12/2020 5039  Gross per 24 hour   Intake 1287.5 ml   Output 950 ml   Net 337.5 ml         Wt Readings from Last 3 Encounters:   04/11/20 238 lb 3.2 oz (108 kg)   01/31/20 258 lb (117 kg)   08/13/19 258 lb (117 kg)       Constitutional:  Pt is in no acute distress  Head: normocephalic, atraumatic  Neck: no JVD  Cardiovascular: regular rate and rhythm, no murmurs, gallops, or rubs  Respiratory:  No rales, rhochi, or wheezes  Gastrointestinal:  Soft, nontender, nondistended, bowel sounds x 4  Ext: tt edema  Skin: dry, no rash  Neuro: aaox3    MEDS (scheduled):    sodium bicarbonate  650 mg Oral BID    amLODIPine  10 mg Oral Daily    calcitRIOL  0.25 mcg Oral Daily    calcium acetate  1,334 mg Oral TID WC    gabapentin  300 mg Oral Daily    hydrALAZINE  50 mg Oral TID    sodium chloride flush  10 mL Intravenous 2 times per day    heparin (porcine)  5,000 Units Subcutaneous 3 times per day       MEDS (infusions):   sodium chloride 75 mL/hr at 04/12/20 1034       MEDS (prn):  trimethobenzamide, ALPRAZolam, oxyCODONE-acetaminophen, sodium chloride flush, acetaminophen **OR** acetaminophen, polyethylene glycol    DATA:    Recent Labs     04/11/20  1035 04/12/20  0507   WBC 14.8* 16.3*   HGB 8.9* 9.1*   HCT 30.2* 31.3*   .7* 101.3*    215     Recent Labs     04/11/20  1035 04/12/20  0507    148*   K 5.6* 5.5*   * 121*   CO2

## 2020-04-12 NOTE — PROGRESS NOTES
Perfectserve to Wound Care nurse, \"pt with excoriation/burn on buttocks. states it may be from sitting in urine at home. seems to be flaking off, keeping clean and dry as possible. if you could put her on the list to see her tomorrow. thank you! \"    Federica Grayson RN

## 2020-04-13 LAB
ANION GAP SERPL CALCULATED.3IONS-SCNC: 11 MMOL/L (ref 7–16)
BUN BLDV-MCNC: 61 MG/DL (ref 8–23)
CALCIUM SERPL-MCNC: 8.5 MG/DL (ref 8.6–10.2)
CHLORIDE BLD-SCNC: 118 MMOL/L (ref 98–107)
CO2: 14 MMOL/L (ref 22–29)
CREAT SERPL-MCNC: 4.4 MG/DL (ref 0.5–1)
EKG ATRIAL RATE: 81 BPM
EKG P AXIS: 77 DEGREES
EKG P-R INTERVAL: 246 MS
EKG Q-T INTERVAL: 464 MS
EKG QRS DURATION: 136 MS
EKG QTC CALCULATION (BAZETT): 539 MS
EKG R AXIS: -45 DEGREES
EKG T AXIS: 90 DEGREES
EKG VENTRICULAR RATE: 81 BPM
GFR AFRICAN AMERICAN: 12
GFR NON-AFRICAN AMERICAN: 10 ML/MIN/1.73
GLUCOSE BLD-MCNC: 126 MG/DL (ref 74–99)
PHOSPHORUS: 4.6 MG/DL (ref 2.5–4.5)
POTASSIUM SERPL-SCNC: 4.5 MMOL/L (ref 3.5–5)
SODIUM BLD-SCNC: 143 MMOL/L (ref 132–146)

## 2020-04-13 PROCEDURE — 97530 THERAPEUTIC ACTIVITIES: CPT

## 2020-04-13 PROCEDURE — 93010 ELECTROCARDIOGRAM REPORT: CPT | Performed by: INTERNAL MEDICINE

## 2020-04-13 PROCEDURE — 36415 COLL VENOUS BLD VENIPUNCTURE: CPT

## 2020-04-13 PROCEDURE — 2140000000 HC CCU INTERMEDIATE R&B

## 2020-04-13 PROCEDURE — 6360000002 HC RX W HCPCS: Performed by: FAMILY MEDICINE

## 2020-04-13 PROCEDURE — 6370000000 HC RX 637 (ALT 250 FOR IP): Performed by: FAMILY MEDICINE

## 2020-04-13 PROCEDURE — 84100 ASSAY OF PHOSPHORUS: CPT

## 2020-04-13 PROCEDURE — 80048 BASIC METABOLIC PNL TOTAL CA: CPT

## 2020-04-13 PROCEDURE — 97161 PT EVAL LOW COMPLEX 20 MIN: CPT

## 2020-04-13 PROCEDURE — 2580000003 HC RX 258: Performed by: INTERNAL MEDICINE

## 2020-04-13 PROCEDURE — 6370000000 HC RX 637 (ALT 250 FOR IP): Performed by: INTERNAL MEDICINE

## 2020-04-13 PROCEDURE — 97165 OT EVAL LOW COMPLEX 30 MIN: CPT

## 2020-04-13 PROCEDURE — 2580000003 HC RX 258: Performed by: FAMILY MEDICINE

## 2020-04-13 RX ORDER — TRIAMCINOLONE ACETONIDE 40 MG/ML
80 INJECTION, SUSPENSION INTRA-ARTICULAR; INTRAMUSCULAR ONCE
Status: DISCONTINUED | OUTPATIENT
Start: 2020-04-13 | End: 2020-04-15 | Stop reason: HOSPADM

## 2020-04-13 RX ORDER — LIDOCAINE HYDROCHLORIDE 10 MG/ML
5 INJECTION, SOLUTION INFILTRATION; PERINEURAL ONCE
Status: DISCONTINUED | OUTPATIENT
Start: 2020-04-13 | End: 2020-04-15 | Stop reason: HOSPADM

## 2020-04-13 RX ADMIN — HEPARIN SODIUM 5000 UNITS: 10000 INJECTION INTRAVENOUS; SUBCUTANEOUS at 14:25

## 2020-04-13 RX ADMIN — GABAPENTIN 300 MG: 300 CAPSULE ORAL at 08:43

## 2020-04-13 RX ADMIN — AMLODIPINE BESYLATE 10 MG: 10 TABLET ORAL at 08:43

## 2020-04-13 RX ADMIN — OXYCODONE HYDROCHLORIDE AND ACETAMINOPHEN 1 TABLET: 5; 325 TABLET ORAL at 05:53

## 2020-04-13 RX ADMIN — SODIUM CHLORIDE: 4.5 INJECTION, SOLUTION INTRAVENOUS at 14:23

## 2020-04-13 RX ADMIN — SODIUM CHLORIDE, PRESERVATIVE FREE 10 ML: 5 INJECTION INTRAVENOUS at 22:45

## 2020-04-13 RX ADMIN — HYDRALAZINE HYDROCHLORIDE 50 MG: 50 TABLET, FILM COATED ORAL at 14:25

## 2020-04-13 RX ADMIN — CALCITRIOL 0.25 MCG: 0.25 CAPSULE ORAL at 08:43

## 2020-04-13 RX ADMIN — SODIUM CHLORIDE, PRESERVATIVE FREE 10 ML: 5 INJECTION INTRAVENOUS at 08:44

## 2020-04-13 RX ADMIN — HEPARIN SODIUM 5000 UNITS: 10000 INJECTION INTRAVENOUS; SUBCUTANEOUS at 05:47

## 2020-04-13 RX ADMIN — HYDRALAZINE HYDROCHLORIDE 50 MG: 50 TABLET, FILM COATED ORAL at 08:43

## 2020-04-13 RX ADMIN — CALCIUM ACETATE 1334 MG: 667 CAPSULE ORAL at 17:18

## 2020-04-13 RX ADMIN — ALPRAZOLAM 0.25 MG: 0.25 TABLET ORAL at 22:44

## 2020-04-13 RX ADMIN — OXYCODONE HYDROCHLORIDE AND ACETAMINOPHEN 1 TABLET: 5; 325 TABLET ORAL at 17:21

## 2020-04-13 RX ADMIN — HEPARIN SODIUM 5000 UNITS: 10000 INJECTION INTRAVENOUS; SUBCUTANEOUS at 21:16

## 2020-04-13 RX ADMIN — HYDRALAZINE HYDROCHLORIDE 50 MG: 50 TABLET, FILM COATED ORAL at 21:16

## 2020-04-13 RX ADMIN — CALCIUM ACETATE 1334 MG: 667 CAPSULE ORAL at 08:43

## 2020-04-13 RX ADMIN — ACETAMINOPHEN 650 MG: 325 TABLET, FILM COATED ORAL at 22:42

## 2020-04-13 RX ADMIN — SODIUM BICARBONATE 650 MG: 650 TABLET ORAL at 21:16

## 2020-04-13 RX ADMIN — ACETAMINOPHEN 650 MG: 325 TABLET, FILM COATED ORAL at 12:15

## 2020-04-13 RX ADMIN — CALCIUM ACETATE 1334 MG: 667 CAPSULE ORAL at 12:15

## 2020-04-13 RX ADMIN — SODIUM CHLORIDE: 4.5 INJECTION, SOLUTION INTRAVENOUS at 04:50

## 2020-04-13 RX ADMIN — SODIUM BICARBONATE 650 MG: 650 TABLET ORAL at 08:43

## 2020-04-13 ASSESSMENT — PAIN SCALES - GENERAL
PAINLEVEL_OUTOF10: 10
PAINLEVEL_OUTOF10: 4
PAINLEVEL_OUTOF10: 9
PAINLEVEL_OUTOF10: 0
PAINLEVEL_OUTOF10: 3
PAINLEVEL_OUTOF10: 8
PAINLEVEL_OUTOF10: 10
PAINLEVEL_OUTOF10: 7
PAINLEVEL_OUTOF10: 0
PAINLEVEL_OUTOF10: 3

## 2020-04-13 ASSESSMENT — PAIN DESCRIPTION - ORIENTATION
ORIENTATION: RIGHT

## 2020-04-13 ASSESSMENT — PAIN DESCRIPTION - DIRECTION: RADIATING_TOWARDS: R SHOULDER

## 2020-04-13 ASSESSMENT — PAIN DESCRIPTION - DESCRIPTORS
DESCRIPTORS: ACHING;CONSTANT;BURNING
DESCRIPTORS: DULL;ACHING;DISCOMFORT
DESCRIPTORS: BURNING;DISCOMFORT;CONSTANT
DESCRIPTORS: ACHING;SORE;DISCOMFORT
DESCRIPTORS: ACHING;SORE;DISCOMFORT

## 2020-04-13 ASSESSMENT — PAIN DESCRIPTION - PAIN TYPE
TYPE: ACUTE PAIN

## 2020-04-13 ASSESSMENT — PAIN DESCRIPTION - FREQUENCY
FREQUENCY: CONTINUOUS

## 2020-04-13 ASSESSMENT — PAIN DESCRIPTION - PROGRESSION: CLINICAL_PROGRESSION: NOT CHANGED

## 2020-04-13 ASSESSMENT — PAIN DESCRIPTION - ONSET: ONSET: ON-GOING

## 2020-04-13 ASSESSMENT — PAIN - FUNCTIONAL ASSESSMENT
PAIN_FUNCTIONAL_ASSESSMENT: ACTIVITIES ARE NOT PREVENTED
PAIN_FUNCTIONAL_ASSESSMENT: PREVENTS OR INTERFERES WITH ALL ACTIVE AND SOME PASSIVE ACTIVITIES
PAIN_FUNCTIONAL_ASSESSMENT: ACTIVITIES ARE NOT PREVENTED

## 2020-04-13 ASSESSMENT — PAIN DESCRIPTION - LOCATION
LOCATION: SHOULDER
LOCATION: CHEST;SHOULDER
LOCATION: SHOULDER
LOCATION: CHEST
LOCATION: CHEST;SHOULDER

## 2020-04-13 NOTE — PROGRESS NOTES
Additional Info:    RUE  3-/5  Patient initially holding R UE guarded - stating its painful   Patient able slowly move R UE  Distally WFLS  AROM present proximally   Progress as patient tolerates  Fair     LUE 4-/5  WFL good  and wfl FMC/dexterity noted during ADL tasks         Hearing: [facility]   Sensation:  No c/o numbness or tingling     Comments:   Upon arrival, patient lying in bed . At end of session, patient returned to bed  with call light and phone within reach, all lines and tubes intact. . Overall patient demonstrated  decreased independence and safety during completion of ADL/functional transfer/mobility tasks. .  Based on patient's functional performance as documented above  and prior level of function, patient would benefit from continued skilled OT during/following hospital stay in an effort to increase functional safety, independence with ADLS/IADLS, and overall quality of life.  ALARM on       Assessment of current deficits   Functional mobility [x]  ADLs [x] Strength [x]  Cognition []  Functional transfers  [x] IADLs [x] Safety Awareness [x]  Endurance [x]  Fine Motor Coordination [] Balance [x] Vision/perception [] Sensation []   Gross Motor Coordination [] ROM [x] Delirium []                  Motor Control []    Plan of Care: OT 1-3x/week PRN  ADL retraining [x]   Equipment needs [x]   Neuromuscular re-education [x] Energy Conservation Techniques [x]  Functional Transfer training [x] Patient and/or Family Education [x]  Functional Mobility training [x]  Environmental Modifications [x]  Cognitive re-training []   Compensatory techniques for ADLs [x]  Splinting Needs []   Positioning to improve overall function [x]   Therapeutic Activity [x]  Therapeutic Exercise  [x]  Visual/Perceptual: []    Delirium prevention/treatment  []   Other:  []    Rehab Potential: Good for established goals     Patient / Family Goal: return home       Patient and/or family were instructed on functional diagnosis, prognosis/goals and OT plan of care. Demonstrated good  understanding.     Low  Evaluation   Tx Time [id number]  Tx Time OUT:  1015                                                                                                    Evaluation time includes thorough review of current medical information, gathering information on past medical history/social history and prior level of function, completion of standardized testing/informal observation of tasks, assessment of data, and development of POC/Goals      Oneita Shows OTR/L [id number]

## 2020-04-13 NOTE — PROGRESS NOTES
Subjective: The patient is awake and alert. complains of severe pain RIGHT shoulder and both knees. Denies chest pain or shortness of breath. Objective:    BP (!) 141/65   Pulse 89   Temp 97.8 °F (36.6 °C) (Oral)   Resp 18   Ht 5' 1\" (1.549 m)   Wt 238 lb 3.2 oz (108 kg)   SpO2 95%   BMI 45.01 kg/m²     Heart:  RRR, no murmurs, gallops, or rubs. Lungs:  CTA bilaterally, no wheeze, rales or rhonchi  Abd: bowel sounds present, nontender, nondistended, no masses,obese  Extrem:  No clubbing, cyanosis, mild edema. varus deformities of both knees   neuro:Alert and oriented and no focal deficits      CBC with Differential:    Lab Results   Component Value Date    WBC 16.3 2020    RBC 3.09 2020    HGB 9.1 2020    HCT 31.3 2020     2020    .3 2020    MCH 29.4 2020    MCHC 29.1 2020    RDW 16.4 2020    SEGSPCT 71 2013    BANDSPCT 1 2016    METASPCT 1 2011    LYMPHOPCT 4.7 2020    MONOPCT 5.8 2020    MYELOPCT 1 2011    BASOPCT 0.2 2020    MONOSABS 0.94 2020    LYMPHSABS 0.77 2020    EOSABS 0.30 2020    BASOSABS 0.04 2020     CMP:    Lab Results   Component Value Date     2020    K 4.5 2020    K 5.5 2020     2020    CO2 14 2020    BUN 61 2020    CREATININE 4.4 2020    GFRAA 12 2020    LABGLOM 10 2020    GLUCOSE 126 2020    GLUCOSE 149 10/12/2011    PROT 5.6 2020    LABALBU 2.2 2020    LABALBU 4.1 10/12/2011    CALCIUM 8.5 2020    BILITOT <0.2 2020    ALKPHOS 93 2020    AST 18 2020    ALT 19 2020      Patient MRN: [id number]   : 1950   Age:  69 years   Gender: Female   Order Date: 2020 10:30 AM       Exam: XR CHEST PORTABLE       Number of Images: 1 view       Indication:  Anterior chest wall pain following fall in morbidly obese   patient       Comparison:

## 2020-04-13 NOTE — PLAN OF CARE
Problem: Falls - Risk of:  Goal: Will remain free from falls  Description: Will remain free from falls  4/13/2020 0954 by Emeli Mishra RN  Outcome: Met This Shift  4/13/2020 0222 by Sara Cortes RN  Outcome: Met This Shift  Goal: Absence of physical injury  Description: Absence of physical injury  Outcome: Met This Shift     Problem: Pain:  Goal: Pain level will decrease  Description: Pain level will decrease  4/13/2020 0222 by Sara Cortes RN  Outcome: Met This Shift     Problem: KNOWLEDGE DEFICIT  Goal: Patient/S.O. demonstrates understanding of disease process, treatment plan, medications, and discharge instructions.   Outcome: Met This Shift     Problem: DISCHARGE BARRIERS  Goal: Patient's continuum of care needs are met  Outcome: Met This Shift

## 2020-04-13 NOTE — PROGRESS NOTES
4. 7* 4.4*   LABGLOM 9 9 10   GLUCOSE 136* 125* 126*   CALCIUM 9.2 8.6 8.5*   ALT  --  19  --    AST  --  18  --    BILITOT  --  <0.2  --    ALKPHOS  --  93  --    PHOS  --   --  4.6*       Lab Results   Component Value Date    LABALBU 2.2 (L) 04/12/2020    LABALBU 1.9 (L) 02/06/2020    LABALBU 1.9 (L) 02/05/2020     Lab Results   Component Value Date    TSH 1.945 08/16/2013       Iron Studies  Lab Results   Component Value Date    IRON 120 05/02/2019    TIBC 246 (L) 05/02/2019    FERRITIN 206 02/01/2020     Vitamin B-12   Date Value Ref Range Status   02/01/2020 620 211 - 946 pg/mL Final     Folate   Date Value Ref Range Status   02/01/2020 4.6 (L) 4.8 - 24.2 ng/mL Final       Vit D, 25-Hydroxy   Date Value Ref Range Status   10/12/2011 13 (L) 30 - 80 ng/mL Final     Comment:     REFERENCE INTERVAL- Vitamin D, 25-Hydroxy    This assay accurately quantifies the sum of vitamin D3,  25-hydroxy and vitamin D2, 25-hydroxy.     0-17 years-  Deficiency- less than 20 ng/mL  Optimum level- greater than or equal to 20 ng/mL*  *(Cobos CL et al. Pediatrics 2008- 122- 1142-52.)    18 years and older-  Deficiency- Less than 20 ng/mL  Insufficiency- 20-29 ng/mL  Optimum Level- 30-80 ng/mL  Possible Toxicity- Greater than 150 ng/mL     PTH   Date Value Ref Range Status   02/01/2020 336 (H) 15 - 65 pg/mL Final       No components found for: URIC    Lab Results   Component Value Date    COLORU Yellow 04/11/2020    NITRU Negative 04/11/2020    GLUCOSEU Negative 04/11/2020    GLUCOSEU NEGATIVE 08/17/2011    KETUA Negative 04/11/2020    UROBILINOGEN 0.2 04/11/2020    BILIRUBINUR Negative 04/11/2020    BILIRUBINUR NEGATIVE 08/17/2011       No results found for: Jorden Hernandes      IMPRESSION/RECOMMENDATIONS:      1. arf on ckd 4  Baseline 4.2 in 2/2020, 3.1 in 8/2019  Now cr at 4.8>>4.7->4.4  trial of hydration  jil without hydro, stones not seen on us but noted on ct spine  Daily bmp     2. uti  Send cx  On rocephin     3. Elevated pth/secondary hyperparathyrodism  Follow closely on vit d analogue due to sarcoid and h/o stones  Ca 8.5, PO4 4.6  On rocaltrol  On phoslo    4. Hyperkalemia  Renal diet  Due to ckd  K 4.5- S/P kayexulate     5. Metabolic acidosis  Started on hco3  Due to ckd     6. HTN in CKD  Near goal <130/80  Follow on current  norvasc apresoline     7. djd  Avoid nsaids with ckd    8.  Hypernatremia  Na stable at 143 on 1/2 ns    Zuleima Garza NP-C  Pt seen and examined agree with above  Cr improving with ivf  Fatou Bowen

## 2020-04-13 NOTE — PLAN OF CARE
Problem: Falls - Risk of:  Goal: Will remain free from falls  Description: Will remain free from falls  4/13/2020 1553 by Eloisa Jaquez RN  Outcome: Met This Shift  4/13/2020 0954 by Nishi Floyd RN  Outcome: Met This Shift  4/13/2020 0222 by Neal Croft RN  Outcome: Met This Shift  Goal: Absence of physical injury  Description: Absence of physical injury  4/13/2020 0954 by Nishi Floyd RN  Outcome: Met This Shift     Problem: Pain:  Goal: Pain level will decrease  Description: Pain level will decrease  4/13/2020 1553 by Eloisa Jaquez RN  Outcome: Met This Shift  4/13/2020 0222 by Neal Croft RN  Outcome: Met This Shift     Problem: DAILY CARE  Goal: Daily care needs are met  Outcome: Met This Shift     Problem: PAIN  Goal: Patient's pain/discomfort is manageable  Outcome: Met This Shift     Problem: KNOWLEDGE DEFICIT  Goal: Patient/S.O. demonstrates understanding of disease process, treatment plan, medications, and discharge instructions.   4/13/2020 0954 by Nishi Floyd RN  Outcome: Met This Shift     Problem: DISCHARGE BARRIERS  Goal: Patient's continuum of care needs are met  4/13/2020 0954 by Nishi Floyd RN  Outcome: Met This Shift

## 2020-04-13 NOTE — PROGRESS NOTES
Physical Therapy  Physical Therapy Initial Assessment     Name: Bartolo Fernandes  : 1950  MRN: [id number]    Referring Provider:  Ramon Bruce MD    Date of Service: 2020    Evaluating PT:  Khurram Dominguez PT, OLLIE. [id number]    Room #:  6747/8397-M  Diagnosis:  Acute renal failure on CKD   Reason for admission:  Frequent falls   Precautions:  Falls  Procedures: none  Equipment Needs:  [employer] - pt owns    SUBJECTIVE:  Pt lives alone in a 2 story home with 2 stair(s) to enter and grab bars. Bed is on 2nd floor and bath is on 2nd floor. Pt ambulated with no AD indoors and [employer] outdoors PTA. Pt independent for ADL performance. OBJECTIVE:   Initial Evaluation  Date:  Treatment   Short Term/ Long Term   Goals   AM-PAC 6 Clicks      Was pt agreeable to Eval/treatment? Yes      Does pt have pain? \"terrible\" R shoulder/ribs     Bed Mobility  Rolling: NT  Supine to sit: MaxA x2  Sit to supine: MaxA x2  Scooting: MaxA x2  Shannon   Transfers Sit to stand: MaxA x2  Stand to sit: MaxA x2  Stand pivot: NT  Shannon   Ambulation    NT    >15 feet with [employer] Shannon   Stair negotiation: ascended and descended  NT  TBD   ROM BUE:  See OT eval   BLE:  WFL     Strength BUE:  See OT eval   BLE:  4-/5  4+/5   Balance Sitting EOB:  SBA  Dynamic Standing:  MaxA  Sitting EOB:  indep  Dynamic Standing:  Shannon     -Pt is A & O x 4  -Sensation:  unremarkable  -Edema:  unremarkable    Therapeutic Exercises:  Functional activity     Patient education  Pt educated on safety, sequencing of transfers, and role of PT    Patient response to education:   Pt verbalized understanding Pt demonstrated skill Pt requires further education in this area   Yes  With cues  Yes      ASSESSMENT:    Comments:  Pt received supine in bed and agreeable to PT session with OT collaboration. RN cleared pt for participation prior to session. Pt limited by weakness, body habitus, and R shoulder pain. Significant assist of two persons needed for all mobility.  Sat EOB x 10

## 2020-04-13 NOTE — PLAN OF CARE
Problem: Falls - Risk of:  Goal: Will remain free from falls  Description: Will remain free from falls  4/13/2020 0222 by Jaylene Quispe RN  Outcome: Met This Shift  4/12/2020 1945 by Ambika Rouse RN  Outcome: Met This Shift  Goal: Absence of physical injury  Description: Absence of physical injury  4/12/2020 1945 by Ambika Rouse RN  Outcome: Met This Shift     Problem: Pain:  Goal: Pain level will decrease  Description: Pain level will decrease  4/13/2020 0222 by Jaylene Quispe RN  Outcome: Met This Shift  4/12/2020 1945 by Ambika Rouse RN  Outcome: Ongoing     Problem: Pain:  Goal: Control of acute pain  Description: Control of acute pain  4/12/2020 1945 by Ambika Rouse RN  Outcome: Ongoing

## 2020-04-14 ENCOUNTER — APPOINTMENT (OUTPATIENT)
Dept: ULTRASOUND IMAGING | Age: 70
DRG: 683 | End: 2020-04-14
Payer: MEDICARE

## 2020-04-14 LAB
ANION GAP SERPL CALCULATED.3IONS-SCNC: 15 MMOL/L (ref 7–16)
BUN BLDV-MCNC: 63 MG/DL (ref 8–23)
CALCIUM SERPL-MCNC: 9 MG/DL (ref 8.6–10.2)
CHLORIDE BLD-SCNC: 113 MMOL/L (ref 98–107)
CO2: 15 MMOL/L (ref 22–29)
CREAT SERPL-MCNC: 3.8 MG/DL (ref 0.5–1)
EKG ATRIAL RATE: 99 BPM
EKG P AXIS: 41 DEGREES
EKG P-R INTERVAL: 216 MS
EKG Q-T INTERVAL: 400 MS
EKG QRS DURATION: 136 MS
EKG QTC CALCULATION (BAZETT): 513 MS
EKG R AXIS: -16 DEGREES
EKG T AXIS: 126 DEGREES
EKG VENTRICULAR RATE: 99 BPM
GFR AFRICAN AMERICAN: 14
GFR NON-AFRICAN AMERICAN: 12 ML/MIN/1.73
GLUCOSE BLD-MCNC: 217 MG/DL (ref 74–99)
POTASSIUM SERPL-SCNC: 5 MMOL/L (ref 3.5–5)
SODIUM BLD-SCNC: 143 MMOL/L (ref 132–146)

## 2020-04-14 PROCEDURE — 93010 ELECTROCARDIOGRAM REPORT: CPT | Performed by: INTERNAL MEDICINE

## 2020-04-14 PROCEDURE — 97530 THERAPEUTIC ACTIVITIES: CPT

## 2020-04-14 PROCEDURE — 6360000002 HC RX W HCPCS: Performed by: FAMILY MEDICINE

## 2020-04-14 PROCEDURE — 6370000000 HC RX 637 (ALT 250 FOR IP): Performed by: FAMILY MEDICINE

## 2020-04-14 PROCEDURE — 6370000000 HC RX 637 (ALT 250 FOR IP): Performed by: NURSE PRACTITIONER

## 2020-04-14 PROCEDURE — 2580000003 HC RX 258: Performed by: FAMILY MEDICINE

## 2020-04-14 PROCEDURE — 80048 BASIC METABOLIC PNL TOTAL CA: CPT

## 2020-04-14 PROCEDURE — 6370000000 HC RX 637 (ALT 250 FOR IP): Performed by: INTERNAL MEDICINE

## 2020-04-14 PROCEDURE — 93880 EXTRACRANIAL BILAT STUDY: CPT

## 2020-04-14 PROCEDURE — 87088 URINE BACTERIA CULTURE: CPT

## 2020-04-14 PROCEDURE — 2580000003 HC RX 258: Performed by: INTERNAL MEDICINE

## 2020-04-14 PROCEDURE — 93971 EXTREMITY STUDY: CPT

## 2020-04-14 PROCEDURE — 36415 COLL VENOUS BLD VENIPUNCTURE: CPT

## 2020-04-14 PROCEDURE — 93005 ELECTROCARDIOGRAM TRACING: CPT | Performed by: FAMILY MEDICINE

## 2020-04-14 PROCEDURE — 2140000000 HC CCU INTERMEDIATE R&B

## 2020-04-14 RX ORDER — SODIUM BICARBONATE 650 MG/1
1300 TABLET ORAL 2 TIMES DAILY
Status: DISCONTINUED | OUTPATIENT
Start: 2020-04-14 | End: 2020-04-15 | Stop reason: HOSPADM

## 2020-04-14 RX ORDER — OXYCODONE HYDROCHLORIDE AND ACETAMINOPHEN 5; 325 MG/1; MG/1
1 TABLET ORAL 3 TIMES DAILY PRN
Status: DISCONTINUED | OUTPATIENT
Start: 2020-04-14 | End: 2020-04-15 | Stop reason: HOSPADM

## 2020-04-14 RX ORDER — PETROLATUM 42 G/100G
OINTMENT TOPICAL 3 TIMES DAILY PRN
Status: DISCONTINUED | OUTPATIENT
Start: 2020-04-14 | End: 2020-04-15 | Stop reason: HOSPADM

## 2020-04-14 RX ORDER — METHYLPREDNISOLONE SODIUM SUCCINATE 125 MG/2ML
125 INJECTION, POWDER, LYOPHILIZED, FOR SOLUTION INTRAMUSCULAR; INTRAVENOUS PRN
Status: DISCONTINUED | OUTPATIENT
Start: 2020-04-14 | End: 2020-04-15 | Stop reason: HOSPADM

## 2020-04-14 RX ORDER — PETROLATUM 42 G/100G
OINTMENT TOPICAL 3 TIMES DAILY
Status: DISCONTINUED | OUTPATIENT
Start: 2020-04-14 | End: 2020-04-15 | Stop reason: HOSPADM

## 2020-04-14 RX ADMIN — HEPARIN SODIUM 5000 UNITS: 10000 INJECTION INTRAVENOUS; SUBCUTANEOUS at 05:55

## 2020-04-14 RX ADMIN — SODIUM BICARBONATE 650 MG: 650 TABLET ORAL at 09:57

## 2020-04-14 RX ADMIN — OXYCODONE HYDROCHLORIDE AND ACETAMINOPHEN 1 TABLET: 5; 325 TABLET ORAL at 09:57

## 2020-04-14 RX ADMIN — OXYCODONE HYDROCHLORIDE AND ACETAMINOPHEN 1 TABLET: 5; 325 TABLET ORAL at 01:08

## 2020-04-14 RX ADMIN — HEPARIN SODIUM 5000 UNITS: 10000 INJECTION INTRAVENOUS; SUBCUTANEOUS at 22:09

## 2020-04-14 RX ADMIN — HYDRALAZINE HYDROCHLORIDE 50 MG: 50 TABLET, FILM COATED ORAL at 16:25

## 2020-04-14 RX ADMIN — CALCIUM ACETATE 1334 MG: 667 CAPSULE ORAL at 12:16

## 2020-04-14 RX ADMIN — SODIUM CHLORIDE: 4.5 INJECTION, SOLUTION INTRAVENOUS at 16:33

## 2020-04-14 RX ADMIN — HYDRALAZINE HYDROCHLORIDE 50 MG: 50 TABLET, FILM COATED ORAL at 09:57

## 2020-04-14 RX ADMIN — CALCITRIOL 0.25 MCG: 0.25 CAPSULE ORAL at 09:57

## 2020-04-14 RX ADMIN — SODIUM CHLORIDE, PRESERVATIVE FREE 10 ML: 5 INJECTION INTRAVENOUS at 09:59

## 2020-04-14 RX ADMIN — SODIUM BICARBONATE 1300 MG: 650 TABLET ORAL at 22:08

## 2020-04-14 RX ADMIN — ACETAMINOPHEN 650 MG: 325 TABLET, FILM COATED ORAL at 05:55

## 2020-04-14 RX ADMIN — ALPRAZOLAM 0.25 MG: 0.25 TABLET ORAL at 22:08

## 2020-04-14 RX ADMIN — CALCIUM ACETATE 1334 MG: 667 CAPSULE ORAL at 16:31

## 2020-04-14 RX ADMIN — GABAPENTIN 300 MG: 300 CAPSULE ORAL at 09:57

## 2020-04-14 RX ADMIN — PETROLATUM: 42 OINTMENT TOPICAL at 16:25

## 2020-04-14 RX ADMIN — AMLODIPINE BESYLATE 10 MG: 10 TABLET ORAL at 09:57

## 2020-04-14 RX ADMIN — METHYLPREDNISOLONE SODIUM SUCCINATE 125 MG: 125 INJECTION, POWDER, FOR SOLUTION INTRAMUSCULAR; INTRAVENOUS at 02:48

## 2020-04-14 RX ADMIN — PETROLATUM: 42 OINTMENT TOPICAL at 22:11

## 2020-04-14 RX ADMIN — SODIUM CHLORIDE: 4.5 INJECTION, SOLUTION INTRAVENOUS at 02:57

## 2020-04-14 RX ADMIN — HYDRALAZINE HYDROCHLORIDE 50 MG: 50 TABLET, FILM COATED ORAL at 22:08

## 2020-04-14 RX ADMIN — CALCIUM ACETATE 1334 MG: 667 CAPSULE ORAL at 09:57

## 2020-04-14 RX ADMIN — OXYCODONE HYDROCHLORIDE AND ACETAMINOPHEN 1 TABLET: 5; 325 TABLET ORAL at 16:32

## 2020-04-14 ASSESSMENT — PAIN DESCRIPTION - ONSET: ONSET: ON-GOING

## 2020-04-14 ASSESSMENT — PAIN SCALES - GENERAL
PAINLEVEL_OUTOF10: 10
PAINLEVEL_OUTOF10: 6
PAINLEVEL_OUTOF10: 9
PAINLEVEL_OUTOF10: 2
PAINLEVEL_OUTOF10: 7
PAINLEVEL_OUTOF10: 9
PAINLEVEL_OUTOF10: 0
PAINLEVEL_OUTOF10: 8
PAINLEVEL_OUTOF10: 10

## 2020-04-14 ASSESSMENT — PAIN DESCRIPTION - LOCATION
LOCATION: ARM;SHOULDER;CHEST
LOCATION: SHOULDER
LOCATION: ARM;CHEST;SHOULDER

## 2020-04-14 ASSESSMENT — PAIN DESCRIPTION - ORIENTATION
ORIENTATION: RIGHT

## 2020-04-14 ASSESSMENT — PAIN DESCRIPTION - PAIN TYPE
TYPE: ACUTE PAIN

## 2020-04-14 ASSESSMENT — PAIN DESCRIPTION - PROGRESSION: CLINICAL_PROGRESSION: GRADUALLY IMPROVING

## 2020-04-14 ASSESSMENT — PAIN DESCRIPTION - FREQUENCY: FREQUENCY: CONTINUOUS

## 2020-04-14 ASSESSMENT — PAIN DESCRIPTION - DESCRIPTORS: DESCRIPTORS: SHARP;SORE;CONSTANT

## 2020-04-14 NOTE — CONSULTS
, Intravenous, Continuous  ALPRAZolam (XANAX) tablet 0.25 mg, 0.25 mg, Oral, Nightly PRN  amLODIPine (NORVASC) tablet 10 mg, 10 mg, Oral, Daily  calcitRIOL (ROCALTROL) capsule 0.25 mcg, 0.25 mcg, Oral, Daily  calcium acetate (PHOSLO) capsule 1,334 mg, 1,334 mg, Oral, TID WC  gabapentin (NEURONTIN) capsule 300 mg, 300 mg, Oral, Daily  hydrALAZINE (APRESOLINE) tablet 50 mg, 50 mg, Oral, TID  sodium chloride flush 0.9 % injection 10 mL, 10 mL, Intravenous, 2 times per day  sodium chloride flush 0.9 % injection 10 mL, 10 mL, Intravenous, PRN  acetaminophen (TYLENOL) tablet 650 mg, 650 mg, Oral, Q6H PRN **OR** acetaminophen (TYLENOL) suppository 650 mg, 650 mg, Rectal, Q6H PRN  polyethylene glycol (GLYCOLAX) packet 17 g, 17 g, Oral, Daily PRN  heparin (porcine) injection 5,000 Units, 5,000 Units, Subcutaneous, 3 times per day  Allergies:  Patient has no known allergies. Social History:   MARITAL STATUS:  , lives alone  Family History:       Problem Relation Age of Onset    Cancer Sister      REVIEW OF SYSTEMS:    Denies numbness or tingling. Denies fever or chills. PHYSICAL EXAM:    VITALS:  BP (!) 160/84   Pulse 91   Temp 96.5 °F (35.8 °C) (Temporal)   Resp 24   Ht 5' 1\" (1.549 m)   Wt 238 lb 3.2 oz (108 kg)   SpO2 98%   BMI 45.01 kg/m²   24HR INTAKE/OUTPUT:    Intake/Output Summary (Last 24 hours) at 4/14/2020 9272  Last data filed at 4/14/2020 0550  Gross per 24 hour   Intake 2521.52 ml   Output 1200 ml   Net 1321.52 ml     URINARY CATHETER OUTPUT ([place]):     CONSTITUTIONAL:  awake, alert, cooperative, no apparent distress, and appears stated age  MUSCULOSKELETAL:  No pain with Right shoulder PROM.  + Pain on palpation along pectoralis major muscle belly and tendon. No ecchymosis. + pain with resisted shoulder internal rotation with arm abducted. Pain in pect major muscle.   5/5 Rotator cuff/ D/B/T/WF/WE/HI  L touch intact  Some swelling RUE but patient is obese  NEUROLOGIC:  Motor Exam:  See above  Sensory:  Sensory intact  SKIN:  intact    DATA:    CBC:   Lab Results   Component Value Date    WBC 16.3 04/12/2020    RBC 3.09 04/12/2020    HGB 9.1 04/12/2020    HCT 31.3 04/12/2020    .3 04/12/2020    MCH 29.4 04/12/2020    MCHC 29.1 04/12/2020    RDW 16.4 04/12/2020     04/12/2020    MPV 11.1 04/12/2020     BMP:    Lab Results   Component Value Date     04/13/2020    K 4.5 04/13/2020    K 5.5 04/12/2020     04/13/2020    CO2 14 04/13/2020    BUN 61 04/13/2020    LABALBU 2.2 04/12/2020    LABALBU 4.1 10/12/2011    CREATININE 4.4 04/13/2020    CALCIUM 8.5 04/13/2020    GFRAA 12 04/13/2020    LABGLOM 10 04/13/2020    GLUCOSE 126 04/13/2020    GLUCOSE 149 10/12/2011     Radiology Review:  Right shoulder XRay:  No fracture or dislocation    IMPRESSION/RECOMMENDATIONS:    Right shoulder Pectoralis Major musculotendonous strain and juvencio knee OA    Give area of injury. .. No cortisone injection an option. Also, given WBC 16.3 would not consider injection anyways. Use sling for comfort     Ice    PT/OT    OK to try a muscle relaxant if ok with primary and nephrology. Might help her pain a lot. F/U in office in 2-3 weeks for re-eval and possible Juvencio knee injections. Will depend on how she is doing medically. Thanks for consult and the opportunity to be involved in Ms. Sutherland Offer care.     Siddhartha Ashby

## 2020-04-14 NOTE — PROGRESS NOTES
100 mL/hr at 04/14/20 0257       MEDS (prn):  oxyCODONE-acetaminophen, methylPREDNISolone, mineral oil-hydrophilic petrolatum **AND** mineral oil-hydrophilic petrolatum, trimethobenzamide, ALPRAZolam, sodium chloride flush, acetaminophen **OR** acetaminophen, polyethylene glycol    DATA:    Recent Labs     04/12/20  0507   WBC 16.3*   HGB 9.1*   HCT 31.3*   .3*        Recent Labs     04/12/20  0507 04/13/20  0544 04/14/20  0820   * 143 143   K 5.5* 4.5 5.0   * 118* 113*   CO2 14* 14* 15*   BUN 66* 61* 63*   CREATININE 4.7* 4.4* 3.8*   LABGLOM 9 10 12   GLUCOSE 125* 126* 217*   CALCIUM 8.6 8.5* 9.0   ALT 19  --   --    AST 18  --   --    BILITOT <0.2  --   --    ALKPHOS 93  --   --    PHOS  --  4.6*  --        Lab Results   Component Value Date    LABALBU 2.2 (L) 04/12/2020    LABALBU 1.9 (L) 02/06/2020    LABALBU 1.9 (L) 02/05/2020     Lab Results   Component Value Date    TSH 1.945 08/16/2013       Iron Studies  Lab Results   Component Value Date    IRON 120 05/02/2019    TIBC 246 (L) 05/02/2019    FERRITIN 206 02/01/2020     Vitamin B-12   Date Value Ref Range Status   02/01/2020 620 211 - 946 pg/mL Final     Folate   Date Value Ref Range Status   02/01/2020 4.6 (L) 4.8 - 24.2 ng/mL Final       Vit D, 25-Hydroxy   Date Value Ref Range Status   10/12/2011 13 (L) 30 - 80 ng/mL Final     Comment:     REFERENCE INTERVAL- Vitamin D, 25-Hydroxy    This assay accurately quantifies the sum of vitamin D3,  25-hydroxy and vitamin D2, 25-hydroxy.     0-17 years-  Deficiency- less than 20 ng/mL  Optimum level- greater than or equal to 20 ng/mL*  *(Cobos CL et al. Pediatrics 2008- 122- 1142-52.)    18 years and older-  Deficiency- Less than 20 ng/mL  Insufficiency- 20-29 ng/mL  Optimum Level- 30-80 ng/mL  Possible Toxicity- Greater than 150 ng/mL     PTH   Date Value Ref Range Status   02/01/2020 336 (H) 15 - 65 pg/mL Final       No components found for: URIC    Lab Results   Component Value Date

## 2020-04-14 NOTE — DISCHARGE INSTR - COC
Stable    Rehab Potential (if transferring to Rehab): Good    Recommended Labs or Other Treatments After Discharge: cbc cmp    Physician Certification: I certify the above information and transfer of [place]  is necessary for the continuing treatment of the diagnosis listed and that she requires Lupillo Solisuel for less 30 days.      Update Admission H&P: No change in H&P    PHYSICIAN SIGNATURE:  Electronically signed by Bossman Delgadillo MD on 4/15/20 at 8:39 AM EDT

## 2020-04-14 NOTE — PROGRESS NOTES
Subjective: The patient is awake and alert. Had a lot of RIGHT shoulder and right-sided chest pain last night. Objective:    BP (!) 147/67   Pulse 87   Temp 96.5 °F (35.8 °C) (Temporal)   Resp 16   Ht 5' 1\" (1.549 m)   Wt 238 lb 3.2 oz (108 kg)   SpO2 94%   BMI 45.01 kg/m²     Heart:  RRR, no murmurs, gallops, or rubs.   Lungs:  CTA bilaterally, no wheeze, rales or rhonchi  Abd: bowel sounds present, nontender, nondistended, no masses  Extrem:  No clubbing, cyanosis, or edema  Reproducible right-sided chest discomfort with pain to motion of the RIGHT shoulder    CBC with Differential:    Lab Results   Component Value Date    WBC 16.3 04/12/2020    RBC 3.09 04/12/2020    HGB 9.1 04/12/2020    HCT 31.3 04/12/2020     04/12/2020    .3 04/12/2020    MCH 29.4 04/12/2020    MCHC 29.1 04/12/2020    RDW 16.4 04/12/2020    SEGSPCT 71 08/16/2013    BANDSPCT 1 03/29/2016    METASPCT 1 07/03/2011    LYMPHOPCT 4.7 04/12/2020    MONOPCT 5.8 04/12/2020    MYELOPCT 1 06/26/2011    BASOPCT 0.2 04/12/2020    MONOSABS 0.94 04/12/2020    LYMPHSABS 0.77 04/12/2020    EOSABS 0.30 04/12/2020    BASOSABS 0.04 04/12/2020     CMP:    Lab Results   Component Value Date     04/14/2020    K 5.0 04/14/2020    K 5.5 04/12/2020     04/14/2020    CO2 15 04/14/2020    BUN 63 04/14/2020    CREATININE 3.8 04/14/2020    GFRAA 14 04/14/2020    LABGLOM 12 04/14/2020    GLUCOSE 217 04/14/2020    GLUCOSE 149 10/12/2011    PROT 5.6 04/12/2020    LABALBU 2.2 04/12/2020    LABALBU 4.1 10/12/2011    CALCIUM 9.0 04/14/2020    BILITOT <0.2 04/12/2020    ALKPHOS 93 04/12/2020    AST 18 04/12/2020    ALT 19 04/12/2020        Assessment:    Patient Active Problem List   Diagnosis    Neurologic gait dysfunction    [facility] failure, acute on chronic ([facility]    Diabetes mellitus ([facility]    Hypertension    Arthritis    Kidney disease    Knee problem    Anemia due to stage 3 chronic kidney disease (HCC)    PERLA (acute kidney injury)

## 2020-04-15 VITALS
HEIGHT: 61 IN | WEIGHT: 238.2 LBS | RESPIRATION RATE: 16 BRPM | OXYGEN SATURATION: 97 % | SYSTOLIC BLOOD PRESSURE: 154 MMHG | TEMPERATURE: 96.3 F | DIASTOLIC BLOOD PRESSURE: 68 MMHG | BODY MASS INDEX: 44.97 KG/M2 | HEART RATE: 76 BPM

## 2020-04-15 PROBLEM — E87.5 HYPERKALEMIA: Status: RESOLVED | Noted: 2020-02-02 | Resolved: 2020-04-15

## 2020-04-15 PROBLEM — N30.00 ACUTE CYSTITIS WITHOUT HEMATURIA: Status: RESOLVED | Noted: 2020-02-01 | Resolved: 2020-04-15

## 2020-04-15 LAB — URINE CULTURE, ROUTINE: NORMAL

## 2020-04-15 PROCEDURE — 6370000000 HC RX 637 (ALT 250 FOR IP): Performed by: FAMILY MEDICINE

## 2020-04-15 PROCEDURE — 6370000000 HC RX 637 (ALT 250 FOR IP): Performed by: NURSE PRACTITIONER

## 2020-04-15 PROCEDURE — 6360000002 HC RX W HCPCS: Performed by: FAMILY MEDICINE

## 2020-04-15 PROCEDURE — 2580000003 HC RX 258: Performed by: FAMILY MEDICINE

## 2020-04-15 RX ORDER — POLYETHYLENE GLYCOL 3350 17 G/17G
17 POWDER, FOR SOLUTION ORAL DAILY PRN
Qty: 527 G | Refills: 1 | DISCHARGE
Start: 2020-04-15 | End: 2020-05-15

## 2020-04-15 RX ORDER — PETROLATUM 42 G/100G
OINTMENT TOPICAL
Refills: 0 | Status: ON HOLD | DISCHARGE
Start: 2020-04-15 | End: 2020-05-27 | Stop reason: HOSPADM

## 2020-04-15 RX ORDER — SODIUM BICARBONATE 650 MG/1
1300 TABLET ORAL 2 TIMES DAILY
Status: ON HOLD | DISCHARGE
Start: 2020-04-15 | End: 2020-05-27 | Stop reason: HOSPADM

## 2020-04-15 RX ORDER — ALPRAZOLAM 0.25 MG/1
0.25 TABLET ORAL NIGHTLY PRN
Qty: 30 TABLET | Refills: 2 | Status: SHIPPED | OUTPATIENT
Start: 2020-04-15 | End: 2020-05-15

## 2020-04-15 RX ORDER — OXYCODONE HYDROCHLORIDE AND ACETAMINOPHEN 5; 325 MG/1; MG/1
1 TABLET ORAL 3 TIMES DAILY PRN
Qty: 90 TABLET | Refills: 0 | Status: SHIPPED | OUTPATIENT
Start: 2020-04-15 | End: 2020-05-15

## 2020-04-15 RX ADMIN — HEPARIN SODIUM 5000 UNITS: 10000 INJECTION INTRAVENOUS; SUBCUTANEOUS at 06:07

## 2020-04-15 RX ADMIN — CALCIUM ACETATE 1334 MG: 667 CAPSULE ORAL at 08:57

## 2020-04-15 RX ADMIN — SODIUM BICARBONATE 1300 MG: 650 TABLET ORAL at 08:58

## 2020-04-15 RX ADMIN — PETROLATUM: 42 OINTMENT TOPICAL at 13:52

## 2020-04-15 RX ADMIN — PETROLATUM: 42 OINTMENT TOPICAL at 08:58

## 2020-04-15 RX ADMIN — HYDRALAZINE HYDROCHLORIDE 50 MG: 50 TABLET, FILM COATED ORAL at 13:52

## 2020-04-15 RX ADMIN — SODIUM CHLORIDE, PRESERVATIVE FREE 10 ML: 5 INJECTION INTRAVENOUS at 08:58

## 2020-04-15 RX ADMIN — AMLODIPINE BESYLATE 10 MG: 10 TABLET ORAL at 08:57

## 2020-04-15 RX ADMIN — GABAPENTIN 300 MG: 300 CAPSULE ORAL at 08:58

## 2020-04-15 RX ADMIN — CALCIUM ACETATE 1334 MG: 667 CAPSULE ORAL at 11:43

## 2020-04-15 RX ADMIN — OXYCODONE HYDROCHLORIDE AND ACETAMINOPHEN 1 TABLET: 5; 325 TABLET ORAL at 06:12

## 2020-04-15 RX ADMIN — HYDRALAZINE HYDROCHLORIDE 50 MG: 50 TABLET, FILM COATED ORAL at 08:58

## 2020-04-15 RX ADMIN — OXYCODONE HYDROCHLORIDE AND ACETAMINOPHEN 1 TABLET: 5; 325 TABLET ORAL at 13:51

## 2020-04-15 RX ADMIN — CALCITRIOL 0.25 MCG: 0.25 CAPSULE ORAL at 08:58

## 2020-04-15 ASSESSMENT — PAIN DESCRIPTION - FREQUENCY: FREQUENCY: INTERMITTENT

## 2020-04-15 ASSESSMENT — PAIN DESCRIPTION - LOCATION: LOCATION: SHOULDER

## 2020-04-15 ASSESSMENT — PAIN DESCRIPTION - PROGRESSION: CLINICAL_PROGRESSION: NOT CHANGED

## 2020-04-15 ASSESSMENT — PAIN SCALES - GENERAL
PAINLEVEL_OUTOF10: 0
PAINLEVEL_OUTOF10: 6
PAINLEVEL_OUTOF10: 6

## 2020-04-15 ASSESSMENT — PAIN DESCRIPTION - ORIENTATION: ORIENTATION: RIGHT

## 2020-04-15 ASSESSMENT — PAIN DESCRIPTION - DIRECTION: RADIATING_TOWARDS: AXILLA

## 2020-04-15 ASSESSMENT — PAIN DESCRIPTION - ONSET: ONSET: GRADUAL

## 2020-04-15 ASSESSMENT — PAIN DESCRIPTION - DESCRIPTORS: DESCRIPTORS: DULL;ACHING;DISCOMFORT

## 2020-04-15 ASSESSMENT — PAIN - FUNCTIONAL ASSESSMENT: PAIN_FUNCTIONAL_ASSESSMENT: PREVENTS OR INTERFERES SOME ACTIVE ACTIVITIES AND ADLS

## 2020-04-15 ASSESSMENT — PAIN DESCRIPTION - PAIN TYPE: TYPE: ACUTE PAIN

## 2020-04-15 NOTE — PROGRESS NOTES
DC papers and scripts placed in folder to send to facility. Papers faxed and nurse to nurse report given.     Muriel Hooker, TOBIAS

## 2020-04-15 NOTE — PROGRESS NOTES
Tremor     Urinary anomaly leakage,urinate>1/night       DIET:    Dietary Nutrition Supplements: Low Calorie High Protein Supplement  DIET GENERAL; Low Potassium     PHYSICAL EXAM:     Patient Vitals for the past 24 hrs:   BP Temp Temp src Pulse Resp SpO2   04/15/20 0725 (!) 154/68 96.3 °F (35.7 °C) Temporal 76 16 97 %   04/14/20 2310 134/60 97.3 °F (36.3 °C) Temporal 79 18 96 %   04/14/20 2200 (!) 148/79 -- -- 79 -- --   04/14/20 1615 (!) 144/81 97.4 °F (36.3 °C) Temporal 79 18 98 %   04/14/20 1212 (!) 147/67 96.5 °F (35.8 °C) Temporal 87 16 --   @      Intake/Output Summary (Last 24 hours) at 4/15/2020 1020  Last data filed at 4/15/2020 8581  Gross per 24 hour   Intake 3420 ml   Output 1800 ml   Net 1620 ml         Wt Readings from Last 3 Encounters:   04/11/20 238 lb 3.2 oz (108 kg)   01/31/20 258 lb (117 kg)   08/13/19 258 lb (117 kg)       Constitutional:  Pt is in no acute distress  Head: normocephalic, atraumatic  Neck: no JVD  Cardiovascular: regular rate and rhythm, no murmurs, gallops, or rubs  Respiratory:  No rales, rhochi, or wheezes  Gastrointestinal:  Soft, nontender, nondistended, bowel sounds x 4  Ext: tt edema  Skin: dry, no rash  Neuro: aaox3    MEDS (scheduled):    mineral oil-hydrophilic petrolatum   Topical TID    sodium bicarbonate  1,300 mg Oral BID    triamcinolone acetonide  80 mg Intramuscular Once    lidocaine  5 mL Intradermal Once    amLODIPine  10 mg Oral Daily    calcitRIOL  0.25 mcg Oral Daily    calcium acetate  1,334 mg Oral TID WC    gabapentin  300 mg Oral Daily    hydrALAZINE  50 mg Oral TID    sodium chloride flush  10 mL Intravenous 2 times per day    heparin (porcine)  5,000 Units Subcutaneous 3 times per day       MEDS (infusions):   sodium chloride 100 mL/hr at 04/14/20 1633       MEDS (prn):  oxyCODONE-acetaminophen, methylPREDNISolone, mineral oil-hydrophilic petrolatum **AND** mineral oil-hydrophilic petrolatum, trimethobenzamide, ALPRAZolam, sodium chloride

## 2020-05-03 ENCOUNTER — APPOINTMENT (OUTPATIENT)
Dept: GENERAL RADIOLOGY | Age: 70
DRG: 853 | End: 2020-05-03
Payer: MEDICARE

## 2020-05-03 ENCOUNTER — HOSPITAL ENCOUNTER (INPATIENT)
Age: 70
LOS: 10 days | Discharge: NURSING, REHAB OR LONG-TERM CARE | DRG: 853 | End: 2020-05-13
Attending: EMERGENCY MEDICINE | Admitting: INTERNAL MEDICINE
Payer: MEDICARE

## 2020-05-03 PROBLEM — A41.9 SEPSIS (HCC): Status: ACTIVE | Noted: 2020-05-03

## 2020-05-03 LAB
ABO/RH: NORMAL
ALBUMIN SERPL-MCNC: 1.8 G/DL (ref 3.5–5.2)
ALP BLD-CCNC: 140 U/L (ref 35–104)
ALT SERPL-CCNC: 22 U/L (ref 0–32)
ANION GAP SERPL CALCULATED.3IONS-SCNC: 12 MMOL/L (ref 7–16)
ANTIBODY SCREEN: NORMAL
AST SERPL-CCNC: 42 U/L (ref 0–31)
BASOPHILS ABSOLUTE: 0.02 E9/L (ref 0–0.2)
BASOPHILS RELATIVE PERCENT: 0.1 % (ref 0–2)
BILIRUB SERPL-MCNC: <0.2 MG/DL (ref 0–1.2)
BUN BLDV-MCNC: 68 MG/DL (ref 8–23)
CALCIUM SERPL-MCNC: 7.9 MG/DL (ref 8.6–10.2)
CHLORIDE BLD-SCNC: 105 MMOL/L (ref 98–107)
CO2: 19 MMOL/L (ref 22–29)
CREAT SERPL-MCNC: 4.3 MG/DL (ref 0.5–1)
EOSINOPHILS ABSOLUTE: 0.02 E9/L (ref 0.05–0.5)
EOSINOPHILS RELATIVE PERCENT: 0.1 % (ref 0–6)
GFR AFRICAN AMERICAN: 12
GFR NON-AFRICAN AMERICAN: 10 ML/MIN/1.73
GLUCOSE BLD-MCNC: 200 MG/DL (ref 74–99)
HCT VFR BLD CALC: 23.5 % (ref 34–48)
HEMOGLOBIN: 7.1 G/DL (ref 11.5–15.5)
IMMATURE GRANULOCYTES #: 0.42 E9/L
IMMATURE GRANULOCYTES %: 2 % (ref 0–5)
LACTIC ACID: 1 MMOL/L (ref 0.5–2.2)
LYMPHOCYTES ABSOLUTE: 0.78 E9/L (ref 1.5–4)
LYMPHOCYTES RELATIVE PERCENT: 3.7 % (ref 20–42)
MCH RBC QN AUTO: 29.8 PG (ref 26–35)
MCHC RBC AUTO-ENTMCNC: 30.2 % (ref 32–34.5)
MCV RBC AUTO: 98.7 FL (ref 80–99.9)
METER GLUCOSE: 260 MG/DL (ref 74–99)
MONOCYTES ABSOLUTE: 1.03 E9/L (ref 0.1–0.95)
MONOCYTES RELATIVE PERCENT: 4.9 % (ref 2–12)
NEUTROPHILS ABSOLUTE: 18.93 E9/L (ref 1.8–7.3)
NEUTROPHILS RELATIVE PERCENT: 89.2 % (ref 43–80)
PDW BLD-RTO: 14.1 FL (ref 11.5–15)
PLATELET # BLD: 259 E9/L (ref 130–450)
PMV BLD AUTO: 10.7 FL (ref 7–12)
POTASSIUM REFLEX MAGNESIUM: 5.3 MMOL/L (ref 3.5–5)
RBC # BLD: 2.38 E12/L (ref 3.5–5.5)
SARS-COV-2, NAAT: DETECTED
SODIUM BLD-SCNC: 136 MMOL/L (ref 132–146)
TOTAL PROTEIN: 5.6 G/DL (ref 6.4–8.3)
WBC # BLD: 21.2 E9/L (ref 4.5–11.5)

## 2020-05-03 PROCEDURE — 82962 GLUCOSE BLOOD TEST: CPT

## 2020-05-03 PROCEDURE — 2580000003 HC RX 258: Performed by: PHYSICIAN ASSISTANT

## 2020-05-03 PROCEDURE — 2500000003 HC RX 250 WO HCPCS: Performed by: INTERNAL MEDICINE

## 2020-05-03 PROCEDURE — U0002 COVID-19 LAB TEST NON-CDC: HCPCS

## 2020-05-03 PROCEDURE — 83605 ASSAY OF LACTIC ACID: CPT

## 2020-05-03 PROCEDURE — 6360000002 HC RX W HCPCS: Performed by: PHYSICIAN ASSISTANT

## 2020-05-03 PROCEDURE — 71045 X-RAY EXAM CHEST 1 VIEW: CPT

## 2020-05-03 PROCEDURE — 85025 COMPLETE CBC W/AUTO DIFF WBC: CPT

## 2020-05-03 PROCEDURE — 80053 COMPREHEN METABOLIC PANEL: CPT

## 2020-05-03 PROCEDURE — 6360000002 HC RX W HCPCS: Performed by: INTERNAL MEDICINE

## 2020-05-03 PROCEDURE — 0LBB0ZZ EXCISION OF LEFT TRUNK TENDON, OPEN APPROACH: ICD-10-PCS | Performed by: SURGERY

## 2020-05-03 PROCEDURE — 6360000002 HC RX W HCPCS: Performed by: STUDENT IN AN ORGANIZED HEALTH CARE EDUCATION/TRAINING PROGRAM

## 2020-05-03 PROCEDURE — 6370000000 HC RX 637 (ALT 250 FOR IP): Performed by: INTERNAL MEDICINE

## 2020-05-03 PROCEDURE — 2500000003 HC RX 250 WO HCPCS: Performed by: STUDENT IN AN ORGANIZED HEALTH CARE EDUCATION/TRAINING PROGRAM

## 2020-05-03 PROCEDURE — 86901 BLOOD TYPING SEROLOGIC RH(D): CPT

## 2020-05-03 PROCEDURE — 87040 BLOOD CULTURE FOR BACTERIA: CPT

## 2020-05-03 PROCEDURE — P9016 RBC LEUKOCYTES REDUCED: HCPCS

## 2020-05-03 PROCEDURE — 86850 RBC ANTIBODY SCREEN: CPT

## 2020-05-03 PROCEDURE — 86923 COMPATIBILITY TEST ELECTRIC: CPT

## 2020-05-03 PROCEDURE — 86900 BLOOD TYPING SEROLOGIC ABO: CPT

## 2020-05-03 PROCEDURE — 87070 CULTURE OTHR SPECIMN AEROBIC: CPT

## 2020-05-03 PROCEDURE — 2140000000 HC CCU INTERMEDIATE R&B

## 2020-05-03 PROCEDURE — 96366 THER/PROPH/DIAG IV INF ADDON: CPT

## 2020-05-03 PROCEDURE — 99285 EMERGENCY DEPT VISIT HI MDM: CPT

## 2020-05-03 PROCEDURE — 2500000003 HC RX 250 WO HCPCS: Performed by: SURGERY

## 2020-05-03 PROCEDURE — 87186 SC STD MICRODIL/AGAR DIL: CPT

## 2020-05-03 PROCEDURE — 2580000003 HC RX 258: Performed by: INTERNAL MEDICINE

## 2020-05-03 PROCEDURE — 96365 THER/PROPH/DIAG IV INF INIT: CPT

## 2020-05-03 PROCEDURE — 6370000000 HC RX 637 (ALT 250 FOR IP): Performed by: STUDENT IN AN ORGANIZED HEALTH CARE EDUCATION/TRAINING PROGRAM

## 2020-05-03 PROCEDURE — 87205 SMEAR GRAM STAIN: CPT

## 2020-05-03 RX ORDER — AMLODIPINE BESYLATE 10 MG/1
10 TABLET ORAL DAILY
Status: DISCONTINUED | OUTPATIENT
Start: 2020-05-03 | End: 2020-05-13 | Stop reason: HOSPADM

## 2020-05-03 RX ORDER — CEPHALEXIN 500 MG/1
500 CAPSULE ORAL 3 TIMES DAILY
Status: ON HOLD | COMMUNITY
End: 2020-05-12 | Stop reason: HOSPADM

## 2020-05-03 RX ORDER — HYDROXYZINE PAMOATE 25 MG/1
25 CAPSULE ORAL
Status: ON HOLD | COMMUNITY
End: 2020-05-27 | Stop reason: HOSPADM

## 2020-05-03 RX ORDER — OXYCODONE HYDROCHLORIDE AND ACETAMINOPHEN 5; 325 MG/1; MG/1
1 TABLET ORAL 3 TIMES DAILY PRN
Status: DISCONTINUED | OUTPATIENT
Start: 2020-05-03 | End: 2020-05-04

## 2020-05-03 RX ORDER — SODIUM BICARBONATE 650 MG/1
1300 TABLET ORAL 2 TIMES DAILY
Status: DISCONTINUED | OUTPATIENT
Start: 2020-05-03 | End: 2020-05-13 | Stop reason: HOSPADM

## 2020-05-03 RX ORDER — GABAPENTIN 300 MG/1
300 CAPSULE ORAL 3 TIMES DAILY
Status: DISCONTINUED | OUTPATIENT
Start: 2020-05-03 | End: 2020-05-04

## 2020-05-03 RX ORDER — SULFAMETHOXAZOLE AND TRIMETHOPRIM 800; 160 MG/1; MG/1
1 TABLET ORAL 2 TIMES DAILY
Status: ON HOLD | COMMUNITY
End: 2020-05-12 | Stop reason: HOSPADM

## 2020-05-03 RX ORDER — CALCITRIOL 0.25 UG/1
0.25 CAPSULE, LIQUID FILLED ORAL DAILY
Status: DISCONTINUED | OUTPATIENT
Start: 2020-05-03 | End: 2020-05-13 | Stop reason: HOSPADM

## 2020-05-03 RX ORDER — POLYETHYLENE GLYCOL 3350 17 G/17G
17 POWDER, FOR SOLUTION ORAL DAILY PRN
Status: DISCONTINUED | OUTPATIENT
Start: 2020-05-03 | End: 2020-05-13 | Stop reason: HOSPADM

## 2020-05-03 RX ORDER — 0.9 % SODIUM CHLORIDE 0.9 %
250 INTRAVENOUS SOLUTION INTRAVENOUS ONCE
Status: DISCONTINUED | OUTPATIENT
Start: 2020-05-03 | End: 2020-05-04

## 2020-05-03 RX ORDER — ACETAMINOPHEN 325 MG/1
650 TABLET ORAL EVERY 6 HOURS PRN
COMMUNITY
End: 2022-02-07

## 2020-05-03 RX ORDER — ZINC GLUCONATE 50 MG
50 TABLET ORAL DAILY
Status: DISCONTINUED | OUTPATIENT
Start: 2020-05-03 | End: 2020-05-11 | Stop reason: ALTCHOICE

## 2020-05-03 RX ORDER — DEXTROSE MONOHYDRATE 25 G/50ML
25 INJECTION, SOLUTION INTRAVENOUS ONCE
Status: COMPLETED | OUTPATIENT
Start: 2020-05-03 | End: 2020-05-03

## 2020-05-03 RX ORDER — HEPARIN SODIUM 10000 [USP'U]/ML
5000 INJECTION, SOLUTION INTRAVENOUS; SUBCUTANEOUS EVERY 8 HOURS
Status: DISCONTINUED | OUTPATIENT
Start: 2020-05-03 | End: 2020-05-13 | Stop reason: HOSPADM

## 2020-05-03 RX ORDER — LIDOCAINE HYDROCHLORIDE AND EPINEPHRINE 10; 10 MG/ML; UG/ML
20 INJECTION, SOLUTION INFILTRATION; PERINEURAL ONCE
Status: COMPLETED | OUTPATIENT
Start: 2020-05-03 | End: 2020-05-03

## 2020-05-03 RX ORDER — ASCORBIC ACID 500 MG
500 TABLET ORAL DAILY
Status: DISCONTINUED | OUTPATIENT
Start: 2020-05-03 | End: 2020-05-11 | Stop reason: ALTCHOICE

## 2020-05-03 RX ORDER — HYDROXYCHLOROQUINE SULFATE 200 MG/1
400 TABLET, FILM COATED ORAL 2 TIMES DAILY
Status: DISCONTINUED | OUTPATIENT
Start: 2020-05-03 | End: 2020-05-04

## 2020-05-03 RX ORDER — HYDRALAZINE HYDROCHLORIDE 25 MG/1
50 TABLET, FILM COATED ORAL 3 TIMES DAILY
Status: DISCONTINUED | OUTPATIENT
Start: 2020-05-03 | End: 2020-05-13 | Stop reason: HOSPADM

## 2020-05-03 RX ORDER — HYDROXYCHLOROQUINE SULFATE 200 MG/1
200 TABLET, FILM COATED ORAL 2 TIMES DAILY
Status: DISCONTINUED | OUTPATIENT
Start: 2020-05-04 | End: 2020-05-04

## 2020-05-03 RX ORDER — FENTANYL CITRATE 50 UG/ML
50 INJECTION, SOLUTION INTRAMUSCULAR; INTRAVENOUS ONCE
Status: DISCONTINUED | OUTPATIENT
Start: 2020-05-03 | End: 2020-05-04

## 2020-05-03 RX ORDER — ACETAMINOPHEN 325 MG/1
650 TABLET ORAL EVERY 4 HOURS PRN
Status: DISCONTINUED | OUTPATIENT
Start: 2020-05-03 | End: 2020-05-13 | Stop reason: HOSPADM

## 2020-05-03 RX ORDER — 0.9 % SODIUM CHLORIDE 0.9 %
1000 INTRAVENOUS SOLUTION INTRAVENOUS ONCE
Status: COMPLETED | OUTPATIENT
Start: 2020-05-03 | End: 2020-05-04

## 2020-05-03 RX ORDER — ALPRAZOLAM 0.25 MG/1
0.25 TABLET ORAL NIGHTLY PRN
Status: DISCONTINUED | OUTPATIENT
Start: 2020-05-03 | End: 2020-05-13 | Stop reason: HOSPADM

## 2020-05-03 RX ORDER — SODIUM HYPOCHLORITE 5 MG/ML
SOLUTION TOPICAL DAILY
Status: DISCONTINUED | OUTPATIENT
Start: 2020-05-03 | End: 2020-05-04

## 2020-05-03 RX ORDER — SODIUM CHLORIDE 9 MG/ML
INJECTION, SOLUTION INTRAVENOUS CONTINUOUS
Status: DISCONTINUED | OUTPATIENT
Start: 2020-05-03 | End: 2020-05-11

## 2020-05-03 RX ADMIN — HEPARIN SODIUM 5000 UNITS: 10000 INJECTION INTRAVENOUS; SUBCUTANEOUS at 23:00

## 2020-05-03 RX ADMIN — Medication 500 MG: at 21:13

## 2020-05-03 RX ADMIN — AMLODIPINE BESYLATE 10 MG: 10 TABLET ORAL at 21:12

## 2020-05-03 RX ADMIN — SODIUM BICARBONATE 50 MEQ: 84 INJECTION INTRAVENOUS at 21:18

## 2020-05-03 RX ADMIN — CALCIUM ACETATE 1334 MG: 667 CAPSULE ORAL at 21:12

## 2020-05-03 RX ADMIN — LIDOCAINE HYDROCHLORIDE AND EPINEPHRINE 20 ML: 10; 10 INJECTION, SOLUTION INFILTRATION; PERINEURAL at 23:02

## 2020-05-03 RX ADMIN — DEXTROSE MONOHYDRATE 25 G: 25 INJECTION, SOLUTION INTRAVENOUS at 21:18

## 2020-05-03 RX ADMIN — LIDOCAINE HYDROCHLORIDE AND EPINEPHRINE 20 ML: 10; 10 INJECTION, SOLUTION INFILTRATION; PERINEURAL at 23:01

## 2020-05-03 RX ADMIN — SODIUM BICARBONATE 1300 MG: 650 TABLET ORAL at 21:13

## 2020-05-03 RX ADMIN — DEXTROSE MONOHYDRATE 2000 MG: 50 INJECTION, SOLUTION INTRAVENOUS at 16:19

## 2020-05-03 RX ADMIN — HYDROXYCHLOROQUINE SULFATE 400 MG: 200 TABLET, FILM COATED ORAL at 21:12

## 2020-05-03 RX ADMIN — SODIUM CHLORIDE 1000 ML: 9 INJECTION, SOLUTION INTRAVENOUS at 16:20

## 2020-05-03 RX ADMIN — LIDOCAINE HYDROCHLORIDE AND EPINEPHRINE 20 ML: 10; 10 INJECTION, SOLUTION INFILTRATION; PERINEURAL at 22:59

## 2020-05-03 RX ADMIN — Medication: at 23:02

## 2020-05-03 RX ADMIN — CALCIUM GLUCONATE 1 G: 98 INJECTION, SOLUTION INTRAVENOUS at 22:59

## 2020-05-03 RX ADMIN — INSULIN HUMAN 10 UNITS: 100 INJECTION, SOLUTION PARENTERAL at 23:01

## 2020-05-03 RX ADMIN — FENTANYL CITRATE 25 MCG: 50 INJECTION, SOLUTION INTRAMUSCULAR; INTRAVENOUS at 23:02

## 2020-05-03 RX ADMIN — CALCITRIOL 0.25 MCG: 0.25 CAPSULE ORAL at 21:13

## 2020-05-03 RX ADMIN — GABAPENTIN 300 MG: 300 CAPSULE ORAL at 23:08

## 2020-05-03 RX ADMIN — Medication 50 MG: at 21:13

## 2020-05-03 ASSESSMENT — PAIN SCALES - GENERAL
PAINLEVEL_OUTOF10: 10

## 2020-05-03 NOTE — ED PROVIDER NOTES
ED Attending  CC: No    HPI:  5/3/20,   Time: 3:26 PM EDT       Yudi Vallejo is a 78 y.o. female presenting to the ED for confusion and wound check, beginning 1 week ago. The complaint has been persistent, moderate in severity, and worsened by nothing. Patient reports she has a coccyx pressure wound x several weeks but has worsened recently. Reports she has been taking bactrim and Keflex since 2020. Intermittent fevers at skilled nursing facility as well as intermittent confusion therefore she was referred to ED for evaluation to r/o sepsis secondary to wound. Patient reports wound is painful but denies all other symptoms at this time. No other recent change in medication other than previously mentioned antibiotics. Review of Systems:   Pertinent positives and negatives are stated within HPI, all other systems reviewed and are negative.          --------------------------------------------- PAST HISTORY ---------------------------------------------  Past Medical History:  has a past medical history of Arthritis, Chronic knee pain, Depression, Diabetes mellitus ([address] Gall stones, Hypertension, Kidney disease, Kidney stones, Obesity, Sarcoidosis, SOBOE (shortness of breath on exertion), Tremor, and Urinary anomaly. Past Surgical History:  has a past surgical history that includes Foot surgery (2009 ); Cystocopy (2011 );  section (x3); Upper gastrointestinal endoscopy (2.18.15); and Cholecystectomy (3/31/16). Social History:  reports that she quit smoking about 8 years ago. She has a 30.00 pack-year smoking history. She has never used smokeless tobacco. She reports that she does not drink alcohol or use drugs. Family History: family history includes Cancer in her sister. The patients home medications have been reviewed. Allergies: Patient has no known allergies.         ---------------------------------------------------PHYSICAL Platelets 017 870 - 465 E9/L    MPV 10.7 7.0 - 12.0 fL    Neutrophils % 89.2 (H) 43.0 - 80.0 %    Immature Granulocytes % 2.0 0.0 - 5.0 %    Lymphocytes % 3.7 (L) 20.0 - 42.0 %    Monocytes % 4.9 2.0 - 12.0 %    Eosinophils % 0.1 0.0 - 6.0 %    Basophils % 0.1 0.0 - 2.0 %    Neutrophils Absolute 18.93 (H) 1.80 - 7.30 E9/L    Immature Granulocytes # 0.42 E9/L    Lymphocytes Absolute 0.78 (L) 1.50 - 4.00 E9/L    Monocytes Absolute 1.03 (H) 0.10 - 0.95 E9/L    Eosinophils Absolute 0.02 (L) 0.05 - 0.50 E9/L    Basophils Absolute 0.02 0.00 - 0.20 E9/L   Comprehensive Metabolic Panel w/ Reflex to MG   Result Value Ref Range    Sodium 136 132 - 146 mmol/L    Potassium reflex Magnesium 5.3 (H) 3.5 - 5.0 mmol/L    Chloride 105 98 - 107 mmol/L    CO2 19 (L) 22 - 29 mmol/L    Anion Gap 12 7 - 16 mmol/L    Glucose 200 (H) 74 - 99 mg/dL    BUN 68 (H) 8 - 23 mg/dL    CREATININE 4.3 (H) 0.5 - 1.0 mg/dL    GFR Non-African American 10 >=60 mL/min/1.73    GFR African American 12     Calcium 7.9 (L) 8.6 - 10.2 mg/dL    Total Protein 5.6 (L) 6.4 - 8.3 g/dL    Alb 1.8 (L) 3.5 - 5.2 g/dL    Total Bilirubin <0.2 0.0 - 1.2 mg/dL    Alkaline Phosphatase 140 (H) 35 - 104 U/L    ALT 22 0 - 32 U/L    AST 42 (H) 0 - 31 U/L   Lactic Acid, Plasma   Result Value Ref Range    Lactic Acid 1.0 0.5 - 2.2 mmol/L   COVID-19   Result Value Ref Range    SARS-CoV-2, NAAT DETECTED (AA) Not Detected   TYPE AND SCREEN   Result Value Ref Range    ABO/Rh O POS     Antibody Screen NEG        RADIOLOGY:  Interpreted by Radiologist.  XR CHEST PORTABLE   Final Result      Cardiomegaly which appears to be stable      No evidence of airspace consolidation or pulmonary venous congestion.           ------------------------- NURSING NOTES AND VITALS REVIEWED ---------------------------   The nursing notes within the ED encounter and vital signs as below have been reviewed by myself.   BP (!) 126/58   Pulse 81   Temp 97.6 °F (36.4 °C) (Temporal)   Resp 18   Wt 232 lb (105.2 kg)   SpO2 94%   BMI 43.84 kg/m²   Oxygen Saturation Interpretation: Normal    The patients available past medical records and past encounters were reviewed. ------------------------------ ED COURSE/MEDICAL DECISION MAKING----------------------  Medications   ALPRAZolam (XANAX) tablet 0.25 mg (has no administration in time range)   amLODIPine (NORVASC) tablet 10 mg (has no administration in time range)   calcitRIOL (ROCALTROL) capsule 0.25 mcg (has no administration in time range)   calcium acetate (PHOSLO) capsule 1,334 mg (has no administration in time range)   gabapentin (NEURONTIN) capsule 300 mg (has no administration in time range)   hydrALAZINE (APRESOLINE) tablet 50 mg (has no administration in time range)   oxyCODONE-acetaminophen (PERCOCET) 5-325 MG per tablet 1 tablet (has no administration in time range)   polyethylene glycol (GLYCOLAX) packet 17 g (has no administration in time range)   sodium bicarbonate tablet 1,300 mg (has no administration in time range)   acetaminophen (TYLENOL) tablet 650 mg (has no administration in time range)   0.9 % sodium chloride infusion (has no administration in time range)   hydroxychloroquine (PLAQUENIL) tablet 400 mg (has no administration in time range)   hydroxychloroquine (PLAQUENIL) tablet 200 mg (has no administration in time range)   vitamin C (ASCORBIC ACID) tablet 500 mg (has no administration in time range)   zinc tablet 50 mg (has no administration in time range)   heparin (porcine) injection 5,000 Units (has no administration in time range)   0.9 % sodium chloride bolus (1,000 mLs Intravenous New Bag 5/3/20 1620)   vancomycin (VANCOCIN) 2,000 mg in dextrose 5 % 500 mL IVPB (2,000 mg Intravenous New Bag 5/3/20 1619)         ED COURSE:  ED Course as of May 03 1909   Sun May 03, 2020   1450 EF 60% based on echo in 2019. [MS]   1529 Baseline for patient.     Creatinine(!): 4.3 [MS]   1529 History of chronic anemia as well as GI bleed in 2016. Denies bloody or black bowel movements. Current Hgb lower than baseline. Hemoglobin Quant(!): 7.1 [MS]   1540 Hemoccult negative. [MS]   [phone] with Dr. Shoshana Miles regarding patient's admission. Agreeable to admission at this time. Patient will go to a COVID floor.    [MS]      ED Course User Index  [MS] Mary Vicky [place]       Medical Decision Making:    Patient is a 21-year-old female presenting emergency department for evaluation of very large sacral decubitus ulcer and possible secondary sepsis. Lab work reveals significant leukocytosis at 21,000. Vitals have remained stable here however at the nursing home she was slightly tachycardic and had intermittent fevers. Patient also was found to be anemic with a hemoglobin of 7.1. She does have chronic anemia however this is lower than her normal baseline. Patient was also found to test positive for COVID-19 although she is not having any respiratory complaints at this time. Disposition is to admit patient for further evaluation and wound care of sacral wound. Vancomycin was started in the emergency department. This patient's ED course included: a personal history and physicial examination, multiple bedside re-evaluations, IV medications and cardiac monitoring    This patient has remained hemodynamically stable during their ED course. Re-Evaluations:             Re-evaluation. Patients symptoms are improving    Re-examination  5/3/20   3:26 PM EDT          Vital Signs:   Vitals:    05/03/20 1618 05/03/20 1712 05/03/20 1804 05/03/20 1845   BP:   (!) 126/46 (!) 126/58   Pulse: 85 84 82 81   Resp: 19 19 16 18   Temp:   98.3 °F (36.8 °C) 97.6 °F (36.4 °C)   TempSrc:    Temporal   SpO2:   93% 94%   Weight:         Card/Pulm:  Rhythm: normal rate. Heart Sounds: Normal S1, S2 and no murmurs, gallops, or rubs. clear to auscultation, no wheezes or rales and unlabored breathing. Capillary Refill: normal.  Radial Pulse:  present 2+.   Skin: Dry, Pink and Warm. Consultations:             Dr. Bain Togolese: agreeable to admission    Critical Care: None        Counseling: The emergency provider has spoken with the patient and discussed todays results, in addition to providing specific details for the plan of care and counseling regarding the diagnosis and prognosis. Questions are answered at this time and they are agreeable with the plan.       --------------------------------- IMPRESSION AND DISPOSITION ---------------------------------    IMPRESSION  1. Pressure injury of skin of sacral region, unspecified injury stage    2. Septicemia ([facility] [address]    3. COVID-19 virus detected    4. Anemia, unspecified type        DISPOSITION  Disposition: Admit to telemetry  Patient condition is stable    NOTE: This report was transcribed using voice recognition software.  Every effort was made to ensure accuracy; however, inadvertent computerized transcription errors may be present        Leena Baumannma  05/03/20 1901

## 2020-05-03 NOTE — ED NOTES
[email or web address]  Spoke with:Luz Westbrook Listen  05/03/20 9588

## 2020-05-03 NOTE — H&P
4/15/20   Jeanna Arango MD   ALPRAZolam Clide Pillow) 0.25 MG tablet Take 1 tablet by mouth nightly as needed for Sleep for up to 30 days. 4/15/20 5/15/20  Jeanna Arango MD   mineral oil-hydrophilic petrolatum (HYDROPHOR) ointment Apply topically as needed. 4/15/20   Jeanna Arango MD   polyethylene glycol [facility]) packet Take 17 g by mouth daily as needed for Constipation 4/15/20 5/15/20  Jeanna Arango MD   calcium acetate (PHOSLO) 667 MG capsule Take 2 capsules by mouth 3 times daily (with meals) 2/6/20   Jeanna Arango MD   hydrALAZINE (APRESOLINE) 50 MG tablet Take 1 tablet by mouth 3 times daily 5/3/19   Jeanna Arango MD   amLODIPine (NORVASC) 10 MG tablet Take 10 mg by mouth daily    Historical Provider, MD   calcitRIOL (ROCALTROL) 0.25 MCG capsule Take 1 capsule by mouth daily 4/2/16   Braulio Erp, DO   gabapentin (NEURONTIN) 300 MG capsule Take 1 capsule by mouth 3 times daily 4/26/15   Historical Provider, MD       Allergies:  Patient has no known allergies. Social History:      The patient currently lives in a nursing home    TOBACCO:   reports that she quit smoking about 8 years ago. She has a 30.00 pack-year smoking history. She has never used smokeless tobacco.  ETOH:   reports no history of alcohol use. Family History:       Reviewed in detail and negative for DM, CAD, Cancer, CVA. Positive as follows:        Problem Relation Age of Onset    Cancer Sister        REVIEW OF SYSTEMS:   Pertinent positives as noted in the HPI. All other systems reviewed and negative. PHYSICAL EXAM:    BP (!) 126/46   Pulse 82   Temp 98.3 °F (36.8 °C)   Resp 16   Wt 232 lb (105.2 kg)   SpO2 93%   BMI 43.84 kg/m²     General appearance:  No apparent distress, appears stated age and cooperative. HEENT:  Normal cephalic, atraumatic without obvious deformity. Pupils equal, round, and reactive to light. Extra ocular muscles intact. Conjunctivae/corneas clear. Neck: Supple, with full range of motion.  No jugular venous distention. Trachea midline. Respiratory:  Normal respiratory effort. Clear to auscultation, bilaterally without Rales/Wheezes/Rhonchi. Cardiovascular:  Regular rate and rhythm with normal S1/S2 without murmurs, rubs or gallops. Abdomen: Soft, non-tender, non-distended with normal bowel sounds. Musculoskeletal:  No clubbing, cyanosis or edema bilaterally. Full range of motion without deformity. Skin: I tried to roll the patient over to examine the sore, she said it was too painful and refused  Capillary Refill: Brisk,< 3 seconds   Peripheral Pulses: +2 palpable, equal bilaterally   Narrative   Patient MRN: [id number]   : 1950   Age:  69 years   Gender: Female   Order Date: 5/3/2020 3:00 PM   Exam: XR CHEST PORTABLE   Number of Images: 1 view   Indication:   fever    fever    Comparison: Prior study from 2020 is available       Findings:       The lungs are clear.  There is no evidence of pulmonary infiltrate or   pleural effusion.  The pulmonary vascularity is unremarkable. There is   cardiomegaly with left ventricular contour. There are calcified   bilateral hilar nodes seen. . There is uncoiling atherosclerotic change   of thoracic aorta. The bony thorax demonstrates no gross abnormality.               Impression       Cardiomegaly which appears to be stable       No evidence of airspace consolidation or pulmonary venous congestion.               Labs:     Recent Labs     20  1453   WBC 21.2*   HGB 7.1*   HCT 23.5*        Recent Labs     20  1453      K 5.3*      CO2 19*   BUN 68*   CREATININE 4.3*   CALCIUM 7.9*     Recent Labs     20  1453   AST 42*   ALT 22   BILITOT <0.2   ALKPHOS 140*     No results for input(s): INR in the last 72 hours. No results for input(s): Ashley Newer in the last 72 hours.     Urinalysis:      Lab Results   Component Value Date    NITRU Negative 2020    WBCUA 10-20 2020    WBCUA 5-10 2011    BACTERIA MANY 04/11/2020    RBCUA 0-1 04/11/2020    RBCUA 0-1 06/26/2013    BLOODU Negative 04/11/2020    SPECGRAV >=1.030 04/11/2020    GLUCOSEU Negative 04/11/2020    GLUCOSEU NEGATIVE 08/17/2011         ASSESSMENT:    Active Hospital Problems    Diagnosis Date Noted    Sepsis ([facility] [address] [A41.9] 05/03/2020   COVID infection  Pressure sore  change in mental status    PLAN:  Possible sepsis, consultation with infectious disease. We will consult surgery due to the sacral wound. Consult nephrology due to her chronic renal failure creatinine is about at her baseline  Start Plaquenil due to COVID infection, although chest x-ray was clear  DVT Prophylaxis: Heparin  Diet: No diet orders on file  Code Status: Prior    PT/OT Eval Status: Pending    Dispo - goal ECF       Antonio Reyna DO    Thank you Bossman Delgaidllo MD for the opportunity to be involved in this patient's care. If you have any questions or concerns please feel free to contact me at [phone].

## 2020-05-04 ENCOUNTER — ANESTHESIA EVENT (OUTPATIENT)
Dept: OPERATING ROOM | Age: 70
DRG: 853 | End: 2020-05-04
Payer: MEDICARE

## 2020-05-04 ENCOUNTER — APPOINTMENT (OUTPATIENT)
Dept: GENERAL RADIOLOGY | Age: 70
DRG: 853 | End: 2020-05-04
Payer: MEDICARE

## 2020-05-04 ENCOUNTER — ANESTHESIA (OUTPATIENT)
Dept: OPERATING ROOM | Age: 70
DRG: 853 | End: 2020-05-04
Payer: MEDICARE

## 2020-05-04 VITALS — SYSTOLIC BLOOD PRESSURE: 99 MMHG | OXYGEN SATURATION: 99 % | DIASTOLIC BLOOD PRESSURE: 82 MMHG

## 2020-05-04 LAB
ANION GAP SERPL CALCULATED.3IONS-SCNC: 12 MMOL/L (ref 7–16)
BACTERIA: ABNORMAL /HPF
BASOPHILS ABSOLUTE: 0 E9/L (ref 0–0.2)
BASOPHILS RELATIVE PERCENT: 0.1 % (ref 0–2)
BILIRUBIN URINE: NEGATIVE
BLOOD BANK DISPENSE STATUS: NORMAL
BLOOD BANK DISPENSE STATUS: NORMAL
BLOOD BANK PRODUCT CODE: NORMAL
BLOOD BANK PRODUCT CODE: NORMAL
BLOOD, URINE: ABNORMAL
BPU ID: NORMAL
BPU ID: NORMAL
BUN BLDV-MCNC: 70 MG/DL (ref 8–23)
CALCIUM SERPL-MCNC: 8.2 MG/DL (ref 8.6–10.2)
CHLORIDE BLD-SCNC: 107 MMOL/L (ref 98–107)
CLARITY: ABNORMAL
CO2: 25 MMOL/L (ref 22–29)
COLOR: YELLOW
CREAT SERPL-MCNC: 4.4 MG/DL (ref 0.5–1)
D DIMER: 860 NG/ML DDU
DESCRIPTION BLOOD BANK: NORMAL
DESCRIPTION BLOOD BANK: NORMAL
EOSINOPHILS ABSOLUTE: 0.15 E9/L (ref 0.05–0.5)
EOSINOPHILS RELATIVE PERCENT: 0.9 % (ref 0–6)
EPITHELIAL CELLS, UA: ABNORMAL /HPF
FOLATE: 2.6 NG/ML (ref 4.8–24.2)
GFR AFRICAN AMERICAN: 12
GFR NON-AFRICAN AMERICAN: 10 ML/MIN/1.73
GLUCOSE BLD-MCNC: 188 MG/DL (ref 74–99)
GLUCOSE URINE: NEGATIVE MG/DL
HCT VFR BLD CALC: 20.3 % (ref 34–48)
HCT VFR BLD CALC: 21.7 % (ref 34–48)
HCT VFR BLD CALC: 25.1 % (ref 34–48)
HEMOGLOBIN: 6.1 G/DL (ref 11.5–15.5)
HEMOGLOBIN: 6.8 G/DL (ref 11.5–15.5)
HEMOGLOBIN: 7.9 G/DL (ref 11.5–15.5)
IRON SATURATION: 55 % (ref 15–50)
IRON: 38 MCG/DL (ref 37–145)
KETONES, URINE: NEGATIVE MG/DL
LEUKOCYTE ESTERASE, URINE: ABNORMAL
LYMPHOCYTES ABSOLUTE: 0.68 E9/L (ref 1.5–4)
LYMPHOCYTES RELATIVE PERCENT: 3.5 % (ref 20–42)
MCH RBC QN AUTO: 29.6 PG (ref 26–35)
MCH RBC QN AUTO: 29.8 PG (ref 26–35)
MCH RBC QN AUTO: 30.1 PG (ref 26–35)
MCHC RBC AUTO-ENTMCNC: 30 % (ref 32–34.5)
MCHC RBC AUTO-ENTMCNC: 31.3 % (ref 32–34.5)
MCHC RBC AUTO-ENTMCNC: 31.5 % (ref 32–34.5)
MCV RBC AUTO: 94 FL (ref 80–99.9)
MCV RBC AUTO: 96 FL (ref 80–99.9)
MCV RBC AUTO: 99 FL (ref 80–99.9)
METER GLUCOSE: 130 MG/DL (ref 74–99)
METER GLUCOSE: 153 MG/DL (ref 74–99)
METER GLUCOSE: 161 MG/DL (ref 74–99)
METER GLUCOSE: 165 MG/DL (ref 74–99)
MONOCYTES ABSOLUTE: 1.03 E9/L (ref 0.1–0.95)
MONOCYTES RELATIVE PERCENT: 6.1 % (ref 2–12)
NEUTROPHILS ABSOLUTE: 15.39 E9/L (ref 1.8–7.3)
NEUTROPHILS RELATIVE PERCENT: 89.6 % (ref 43–80)
NITRITE, URINE: NEGATIVE
OVALOCYTES: ABNORMAL
PDW BLD-RTO: 13.9 FL (ref 11.5–15)
PDW BLD-RTO: 14.5 FL (ref 11.5–15)
PDW BLD-RTO: 15.3 FL (ref 11.5–15)
PH UA: 5.5 (ref 5–9)
PLATELET # BLD: 202 E9/L (ref 130–450)
PLATELET # BLD: 238 E9/L (ref 130–450)
PLATELET # BLD: 240 E9/L (ref 130–450)
PMV BLD AUTO: 10.3 FL (ref 7–12)
PMV BLD AUTO: 10.5 FL (ref 7–12)
PMV BLD AUTO: 10.8 FL (ref 7–12)
POIKILOCYTES: ABNORMAL
POLYCHROMASIA: ABNORMAL
POTASSIUM SERPL-SCNC: 4.9 MMOL/L (ref 3.5–5)
PROTEIN UA: 100 MG/DL
RBC # BLD: 2.05 E12/L (ref 3.5–5.5)
RBC # BLD: 2.26 E12/L (ref 3.5–5.5)
RBC # BLD: 2.67 E12/L (ref 3.5–5.5)
RBC UA: ABNORMAL /HPF (ref 0–2)
SODIUM BLD-SCNC: 144 MMOL/L (ref 132–146)
SPECIFIC GRAVITY UA: 1.02 (ref 1–1.03)
TARGET CELLS: ABNORMAL
TOTAL IRON BINDING CAPACITY: 69 MCG/DL (ref 250–450)
UROBILINOGEN, URINE: 0.2 E.U./DL
VITAMIN B-12: 546 PG/ML (ref 211–946)
WBC # BLD: 14.4 E9/L (ref 4.5–11.5)
WBC # BLD: 14.5 E9/L (ref 4.5–11.5)
WBC # BLD: 17.1 E9/L (ref 4.5–11.5)
WBC UA: ABNORMAL /HPF (ref 0–5)

## 2020-05-04 PROCEDURE — 2580000003 HC RX 258: Performed by: SURGERY

## 2020-05-04 PROCEDURE — 36415 COLL VENOUS BLD VENIPUNCTURE: CPT

## 2020-05-04 PROCEDURE — 87070 CULTURE OTHR SPECIMN AEROBIC: CPT

## 2020-05-04 PROCEDURE — 87205 SMEAR GRAM STAIN: CPT

## 2020-05-04 PROCEDURE — 36556 INSERT NON-TUNNEL CV CATH: CPT

## 2020-05-04 PROCEDURE — 6360000002 HC RX W HCPCS: Performed by: STUDENT IN AN ORGANIZED HEALTH CARE EDUCATION/TRAINING PROGRAM

## 2020-05-04 PROCEDURE — 3700000000 HC ANESTHESIA ATTENDED CARE: Performed by: SURGERY

## 2020-05-04 PROCEDURE — 87088 URINE BACTERIA CULTURE: CPT

## 2020-05-04 PROCEDURE — 6370000000 HC RX 637 (ALT 250 FOR IP): Performed by: STUDENT IN AN ORGANIZED HEALTH CARE EDUCATION/TRAINING PROGRAM

## 2020-05-04 PROCEDURE — 2500000003 HC RX 250 WO HCPCS: Performed by: STUDENT IN AN ORGANIZED HEALTH CARE EDUCATION/TRAINING PROGRAM

## 2020-05-04 PROCEDURE — 87075 CULTR BACTERIA EXCEPT BLOOD: CPT

## 2020-05-04 PROCEDURE — 85378 FIBRIN DEGRADE SEMIQUANT: CPT

## 2020-05-04 PROCEDURE — 02HV33Z INSERTION OF INFUSION DEVICE INTO SUPERIOR VENA CAVA, PERCUTANEOUS APPROACH: ICD-10-PCS | Performed by: SURGERY

## 2020-05-04 PROCEDURE — 3600000012 HC SURGERY LEVEL 2 ADDTL 15MIN: Performed by: SURGERY

## 2020-05-04 PROCEDURE — 87186 SC STD MICRODIL/AGAR DIL: CPT

## 2020-05-04 PROCEDURE — 3600000002 HC SURGERY LEVEL 2 BASE: Performed by: SURGERY

## 2020-05-04 PROCEDURE — 87077 CULTURE AEROBIC IDENTIFY: CPT

## 2020-05-04 PROCEDURE — 83540 ASSAY OF IRON: CPT

## 2020-05-04 PROCEDURE — 81001 URINALYSIS AUTO W/SCOPE: CPT

## 2020-05-04 PROCEDURE — 2709999900 HC NON-CHARGEABLE SUPPLY: Performed by: SURGERY

## 2020-05-04 PROCEDURE — 2580000003 HC RX 258: Performed by: INTERNAL MEDICINE

## 2020-05-04 PROCEDURE — 36430 TRANSFUSION BLD/BLD COMPNT: CPT

## 2020-05-04 PROCEDURE — 6360000002 HC RX W HCPCS: Performed by: INTERNAL MEDICINE

## 2020-05-04 PROCEDURE — 85025 COMPLETE CBC W/AUTO DIFF WBC: CPT

## 2020-05-04 PROCEDURE — 2580000003 HC RX 258: Performed by: STUDENT IN AN ORGANIZED HEALTH CARE EDUCATION/TRAINING PROGRAM

## 2020-05-04 PROCEDURE — 71045 X-RAY EXAM CHEST 1 VIEW: CPT

## 2020-05-04 PROCEDURE — 87015 SPECIMEN INFECT AGNT CONCNTJ: CPT

## 2020-05-04 PROCEDURE — 87116 MYCOBACTERIA CULTURE: CPT

## 2020-05-04 PROCEDURE — 3700000001 HC ADD 15 MINUTES (ANESTHESIA): Performed by: SURGERY

## 2020-05-04 PROCEDURE — 2140000000 HC CCU INTERMEDIATE R&B

## 2020-05-04 PROCEDURE — 87106 FUNGI IDENTIFICATION YEAST: CPT

## 2020-05-04 PROCEDURE — 87206 SMEAR FLUORESCENT/ACID STAI: CPT

## 2020-05-04 PROCEDURE — 6370000000 HC RX 637 (ALT 250 FOR IP): Performed by: SURGERY

## 2020-05-04 PROCEDURE — 2500000003 HC RX 250 WO HCPCS: Performed by: SURGERY

## 2020-05-04 PROCEDURE — 87102 FUNGUS ISOLATION CULTURE: CPT

## 2020-05-04 PROCEDURE — 87176 TISSUE HOMOGENIZATION CULTR: CPT

## 2020-05-04 PROCEDURE — 6360000002 HC RX W HCPCS: Performed by: NURSE ANESTHETIST, CERTIFIED REGISTERED

## 2020-05-04 PROCEDURE — 85027 COMPLETE CBC AUTOMATED: CPT

## 2020-05-04 PROCEDURE — 6360000002 HC RX W HCPCS: Performed by: SURGERY

## 2020-05-04 PROCEDURE — 80048 BASIC METABOLIC PNL TOTAL CA: CPT

## 2020-05-04 PROCEDURE — 82962 GLUCOSE BLOOD TEST: CPT

## 2020-05-04 PROCEDURE — 82607 VITAMIN B-12: CPT

## 2020-05-04 PROCEDURE — 83550 IRON BINDING TEST: CPT

## 2020-05-04 PROCEDURE — 82746 ASSAY OF FOLIC ACID SERUM: CPT

## 2020-05-04 RX ORDER — OXYCODONE HYDROCHLORIDE AND ACETAMINOPHEN 5; 325 MG/1; MG/1
2 TABLET ORAL EVERY 4 HOURS PRN
Status: DISCONTINUED | OUTPATIENT
Start: 2020-05-04 | End: 2020-05-13 | Stop reason: HOSPADM

## 2020-05-04 RX ORDER — DEXTROSE MONOHYDRATE 25 G/50ML
12.5 INJECTION, SOLUTION INTRAVENOUS PRN
Status: DISCONTINUED | OUTPATIENT
Start: 2020-05-04 | End: 2020-05-13 | Stop reason: HOSPADM

## 2020-05-04 RX ORDER — SODIUM HYPOCHLORITE 5 MG/ML
SOLUTION TOPICAL PRN
Status: DISCONTINUED | OUTPATIENT
Start: 2020-05-04 | End: 2020-05-04 | Stop reason: HOSPADM

## 2020-05-04 RX ORDER — FENTANYL CITRATE 50 UG/ML
25 INJECTION, SOLUTION INTRAMUSCULAR; INTRAVENOUS ONCE
Status: COMPLETED | OUTPATIENT
Start: 2020-05-04 | End: 2020-05-03

## 2020-05-04 RX ORDER — NICOTINE POLACRILEX 4 MG
15 LOZENGE BUCCAL PRN
Status: DISCONTINUED | OUTPATIENT
Start: 2020-05-04 | End: 2020-05-13 | Stop reason: HOSPADM

## 2020-05-04 RX ORDER — DEXTROSE MONOHYDRATE 50 MG/ML
100 INJECTION, SOLUTION INTRAVENOUS PRN
Status: DISCONTINUED | OUTPATIENT
Start: 2020-05-04 | End: 2020-05-13 | Stop reason: HOSPADM

## 2020-05-04 RX ORDER — LIDOCAINE HYDROCHLORIDE AND EPINEPHRINE 10; 10 MG/ML; UG/ML
INJECTION, SOLUTION INFILTRATION; PERINEURAL PRN
Status: DISCONTINUED | OUTPATIENT
Start: 2020-05-04 | End: 2020-05-04 | Stop reason: HOSPADM

## 2020-05-04 RX ORDER — GABAPENTIN 100 MG/1
100 CAPSULE ORAL 3 TIMES DAILY
Status: DISCONTINUED | OUTPATIENT
Start: 2020-05-04 | End: 2020-05-13 | Stop reason: HOSPADM

## 2020-05-04 RX ORDER — CLINDAMYCIN PHOSPHATE 600 MG/50ML
600 INJECTION INTRAVENOUS ONCE
Status: COMPLETED | OUTPATIENT
Start: 2020-05-04 | End: 2020-05-04

## 2020-05-04 RX ORDER — OXYCODONE HYDROCHLORIDE AND ACETAMINOPHEN 5; 325 MG/1; MG/1
1 TABLET ORAL EVERY 4 HOURS PRN
Status: DISCONTINUED | OUTPATIENT
Start: 2020-05-04 | End: 2020-05-13 | Stop reason: HOSPADM

## 2020-05-04 RX ORDER — PROPOFOL 10 MG/ML
INJECTION, EMULSION INTRAVENOUS CONTINUOUS PRN
Status: DISCONTINUED | OUTPATIENT
Start: 2020-05-04 | End: 2020-05-04 | Stop reason: SDUPTHER

## 2020-05-04 RX ORDER — SODIUM CHLORIDE 0.9 % (FLUSH) 0.9 %
10 SYRINGE (ML) INJECTION PRN
Status: DISCONTINUED | OUTPATIENT
Start: 2020-05-04 | End: 2020-05-13 | Stop reason: HOSPADM

## 2020-05-04 RX ORDER — SODIUM HYPOCHLORITE 5 MG/ML
SOLUTION TOPICAL 2 TIMES DAILY
Status: DISCONTINUED | OUTPATIENT
Start: 2020-05-04 | End: 2020-05-06

## 2020-05-04 RX ORDER — 0.9 % SODIUM CHLORIDE 0.9 %
20 INTRAVENOUS SOLUTION INTRAVENOUS ONCE
Status: COMPLETED | OUTPATIENT
Start: 2020-05-04 | End: 2020-05-04

## 2020-05-04 RX ADMIN — PROPOFOL 75 MCG/KG/MIN: 10 INJECTION, EMULSION INTRAVENOUS at 17:59

## 2020-05-04 RX ADMIN — SODIUM BICARBONATE 1300 MG: 650 TABLET ORAL at 21:59

## 2020-05-04 RX ADMIN — SODIUM CHLORIDE: 9 INJECTION, SOLUTION INTRAVENOUS at 00:58

## 2020-05-04 RX ADMIN — SODIUM CHLORIDE: 9 INJECTION, SOLUTION INTRAVENOUS at 21:57

## 2020-05-04 RX ADMIN — PIPERACILLIN AND TAZOBACTAM 3.38 G: 3; .375 INJECTION, POWDER, FOR SOLUTION INTRAVENOUS at 00:58

## 2020-05-04 RX ADMIN — HYDRALAZINE HYDROCHLORIDE 50 MG: 25 TABLET, FILM COATED ORAL at 21:58

## 2020-05-04 RX ADMIN — PIPERACILLIN AND TAZOBACTAM 3.38 G: 3; .375 INJECTION, POWDER, FOR SOLUTION INTRAVENOUS at 14:37

## 2020-05-04 RX ADMIN — CLINDAMYCIN PHOSPHATE 600 MG: 600 INJECTION, SOLUTION INTRAVENOUS at 05:56

## 2020-05-04 RX ADMIN — SODIUM CHLORIDE 250 ML: 9 INJECTION, SOLUTION INTRAVENOUS at 17:40

## 2020-05-04 RX ADMIN — HEPARIN SODIUM 5000 UNITS: 10000 INJECTION INTRAVENOUS; SUBCUTANEOUS at 21:58

## 2020-05-04 RX ADMIN — GABAPENTIN 100 MG: 100 CAPSULE ORAL at 21:58

## 2020-05-04 RX ADMIN — SODIUM CHLORIDE: 9 INJECTION, SOLUTION INTRAVENOUS at 17:53

## 2020-05-04 RX ADMIN — OXYCODONE HYDROCHLORIDE AND ACETAMINOPHEN 2 TABLET: 5; 325 TABLET ORAL at 20:40

## 2020-05-04 RX ADMIN — HEPARIN SODIUM 5000 UNITS: 10000 INJECTION INTRAVENOUS; SUBCUTANEOUS at 06:26

## 2020-05-04 RX ADMIN — SODIUM CHLORIDE 20 ML: 9 INJECTION, SOLUTION INTRAVENOUS at 12:52

## 2020-05-04 RX ADMIN — EPOETIN ALFA-EPBX 3000 UNITS: 3000 INJECTION, SOLUTION INTRAVENOUS; SUBCUTANEOUS at 21:59

## 2020-05-04 RX ADMIN — INSULIN LISPRO 3 UNITS: 100 INJECTION, SOLUTION INTRAVENOUS; SUBCUTANEOUS at 21:58

## 2020-05-04 RX ADMIN — SODIUM CHLORIDE, PRESERVATIVE FREE 10 ML: 5 INJECTION INTRAVENOUS at 21:59

## 2020-05-04 ASSESSMENT — PULMONARY FUNCTION TESTS
PIF_VALUE: 0
PIF_VALUE: 1
PIF_VALUE: 0

## 2020-05-04 ASSESSMENT — ENCOUNTER SYMPTOMS: SHORTNESS OF BREATH: 1

## 2020-05-04 ASSESSMENT — PAIN - FUNCTIONAL ASSESSMENT: PAIN_FUNCTIONAL_ASSESSMENT: ACTIVITIES ARE NOT PREVENTED

## 2020-05-04 ASSESSMENT — PAIN DESCRIPTION - LOCATION: LOCATION: BUTTOCKS

## 2020-05-04 ASSESSMENT — PAIN DESCRIPTION - FREQUENCY: FREQUENCY: INTERMITTENT

## 2020-05-04 ASSESSMENT — PAIN DESCRIPTION - PAIN TYPE: TYPE: SURGICAL PAIN

## 2020-05-04 ASSESSMENT — PAIN DESCRIPTION - ONSET: ONSET: ON-GOING

## 2020-05-04 ASSESSMENT — PAIN DESCRIPTION - PROGRESSION: CLINICAL_PROGRESSION: NOT CHANGED

## 2020-05-04 ASSESSMENT — PAIN SCALES - GENERAL
PAINLEVEL_OUTOF10: 0
PAINLEVEL_OUTOF10: 7
PAINLEVEL_OUTOF10: 0
PAINLEVEL_OUTOF10: 0

## 2020-05-04 ASSESSMENT — PAIN DESCRIPTION - DESCRIPTORS: DESCRIPTORS: PATIENT UNABLE TO DESCRIBE

## 2020-05-04 NOTE — CONSULTS
GENERAL SURGERY  CONSULT NOTE            Date: 5/3/2020        Patient Name: Aaron Ghosh     YOB: 1950      Age:  78 y.o. Consult by: Dr Uyen Adair  Consult to: Dr Alondra Noel  Reason:  Sacral decub ulcer    Chief Complaint     Chief Complaint   Patient presents with    Fatigue     Pt states she feels more weak than normal. Pt currently alert and oriented times 4. Large foul smelling coccyx wound that she has had for 3 months. Denies any fevers. History Obtained From   patient, electronic medical record    History of Present Illness   Aaron Ghosh is a 78 y.o. female with DM, CKD, sarcoid, lapchole 3/2016, csection who presents for altered mental status for ~1 week and worsening sacral decub. She has been in [facility] for about 6 weeks (was walking prior) and developed a sacral decub ulcer since the last 3-4 weeks, which has gradually worsened. She denies fevers, but she had increasing pain and drainage at the wound, with severe malodor. This is despite Bactrim and Keflex started on 4/29 (four days ago). She refuses to let the nurses turn her, because she feels they are too rough with her, and they apparently don't turn her back the way she wants once they do. She spends very minimal time ambulating with therapy during the day. Her diabetes has been fairly well controlled, today is the highest it's been (200). Denies prior history of abscesses or wounds. She is found to be covid positive on admission here, but she denied dyspnea at rest (only with exertion) and denied fever, cough, other ill symptoms, or sick contacts.     Past Medical History     Past Medical History:   Diagnosis Date    Arthritis     knees    Chronic knee pain     Depression     Diabetes mellitus ([facility] [address]     type 2    Gall stones     Hypertension     Kidney disease     Kidney stones     Obesity     Sarcoidosis     SOBOE 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 meals) 2/6/20  Yes Zenon Johnson MD   hydrALAZINE (APRESOLINE) 50 MG tablet Take 1 tablet by mouth 3 times daily 5/3/19  Yes Zenon Johnson MD   amLODIPine (NORVASC) 10 MG tablet Take 10 mg by mouth daily   Yes Historical Provider, MD   calcitRIOL (ROCALTROL) 0.25 MCG capsule Take 1 capsule by mouth daily 4/2/16  Yes Chuy Lozano DO   gabapentin (NEURONTIN) 300 MG capsule Take 1 capsule by mouth 3 times daily 4/26/15  Yes Historical Provider, MD        Inpatient:  ALPRAZolam Deepa Suzie) tablet 0.25 mg, Nightly PRN  amLODIPine (NORVASC) tablet 10 mg, Daily  calcitRIOL (ROCALTROL) capsule 0.25 mcg, Daily  calcium acetate (PHOSLO) capsule 1,334 mg, TID WC  gabapentin (NEURONTIN) capsule 300 mg, TID  hydrALAZINE (APRESOLINE) tablet 50 mg, TID  oxyCODONE-acetaminophen (PERCOCET) 5-325 MG per tablet 1 tablet, TID PRN  polyethylene glycol (GLYCOLAX) packet 17 g, Daily PRN  sodium bicarbonate tablet 1,300 mg, BID  acetaminophen (TYLENOL) tablet 650 mg, Q4H PRN  0.9 % sodium chloride infusion, Continuous  hydroxychloroquine (PLAQUENIL) tablet 400 mg, BID  [START ON 5/4/2020] hydroxychloroquine (PLAQUENIL) tablet 200 mg, BID  vitamin C (ASCORBIC ACID) tablet 500 mg, Daily  zinc tablet 50 mg, Daily  heparin (porcine) injection 5,000 Units, Q8H  insulin regular (HUMULIN R;NOVOLIN R) injection 10 Units, Once  dextrose 50 % IV solution, Once  sodium bicarbonate 8.4 % injection 50 mEq, Once  calcium gluconate 1 g in dextrose 5 % 100 mL IVPB, Once  lidocaine-EPINEPHrine 1 percent-[id number] injection 20 mL, Once        Allergies   Patient has no known allergies.     Social History     Social History     Tobacco History     Smoking Status  Former Smoker Quit date  8/13/2011 Smoking Frequency  1 pack/day for 30 years (30 pk yrs)    Smokeless Tobacco Use  Never Used          Alcohol History     Alcohol Use Status  No          Drug Use     Drug Use Status  No          Sexual Activity     Sexually Active  Not Currently                Family 78 years Gender: Female Order Date: 5/3/2020 3:00 PM Exam: XR CHEST PORTABLE Number of Images: 1 view Indication:   fever fever Comparison: Prior study from 04/11/2020 is available Findings: The lungs are clear. There is no evidence of pulmonary infiltrate or pleural effusion. The pulmonary vascularity is unremarkable. There is cardiomegaly with left ventricular contour. There are calcified bilateral hilar nodes seen. . There is uncoiling atherosclerotic change of thoracic aorta. The bony thorax demonstrates no gross abnormality. Cardiomegaly which appears to be stable No evidence of airspace consolidation or pulmonary venous congestion. Assessment      Hospital Problems           Last Modified POA    Sepsis ([facility] [address] 5/3/2020 Yes          78 y.o. female with necrotizing sacral decubitus wound infection, possible sepsis    Plan   - likely needs OR, but will first attempt bedside debridement   - dakins with daily packing changes  - IV abx per ID, surviving sepsis guidelines    Plan was discussed with Dr. Carey Sethi.     Electronically signed by Pankaj Dennis MD on 5/3/20 at 8:37 PM EDT

## 2020-05-04 NOTE — PROGRESS NOTES
Called ED and MICU to ask for a nurse to try and get a new IV site. Multiple RN's have tried previously with no success.

## 2020-05-04 NOTE — CONSULTS
GLUCOSE 149 10/12/2011     Magnesium:    Lab Results   Component Value Date    MG 1.7 02/04/2020     Phosphorus:    Lab Results   Component Value Date    PHOS 4.6 04/13/2020         Assessment/Plan  1. CKD 4/5  Baseline 4.2, eGFR 10-12 ml/min in 2/2020, 3.1 in 8/2019  Serum cr 4.3->4.4   Follow on IVF  4/11/20 JOSUÉ showed no hydronephrosis  Daily bmp     2. Sacral wound infection  S/p bedside sharp excisional debridement of sacral pressure ulcer on 05/03  Wound culture showing mixed gram-positive organisms, gram-negative rods, Proteus species. On piperacillin-tazobactam, vancomycin   ID following  For further debridement in the OR today    3. Sepsis  SARS-CoV-2 positive  On IVF, piperacillin-tazobactam, vancomycin   Blood, urine cultures pending  ID following    4. Anemia of CKD  Hgb 7.1 -> 6.1 - s/p 2 units pRBCs  Start ARUN   Iron sat, TIBC, folate low, no iron supplementation with infectionTransfuse for hgb below 7.    5. Secondary hyperparathyrodism of renal origin  Follow closely on vit d analogue due to sarcoid and h/o stones  Ca 8.2, check PO4  PTH 2/1/20 was 336, will re-check  On rocaltrol, phoslo     6. Hyperkalemia with CKD  On admission 5.3, today 4.9  Will follow     7. Metabolic acidosis with CKD, sepsis  CO2 19 on admission, 25 today    6. HTN in CKD Stages I-IV  BP at target of <130/80  Follow on norvasc, apresoline     Thank you for the opportunity to participate in the care of [facility]. PABLO Maciel-CNS     ______________________________    Patient seen and examined all key components of the physical performed independently , case discussed with NP, all pertinent labs and radiologic tests personally reviewed agree with above.     -CKD stage 5 on the asis of diabetes mellitus; admitted with confusion and sacral decubitus ulcer; Current cr at baseline; monitor UO; adjust meds for level of kidney function  -Monitor labs  Elian Smith MD

## 2020-05-04 NOTE — PROGRESS NOTES
Messaged Dr. Jose A Gamble about not being able to get a good IV site to hang the blood that was ordered. Suggested a midline.

## 2020-05-04 NOTE — PROGRESS NOTES
COVID-19  -Continue supportive care and IV hydration    #Sacral pressure decubitus ulcer that is post bedside sharp excisional debridement on  5/3/2020  -Appreciate surgery recommendations.  -Await cultures. Continue Zosyn. #Advanced chronic kidney disease  -As per nephrology. Patient approaching ESRD    #Acute metabolic and sepsis encephalopathy-?uremia or decreased clearance of medications  -Reduced gabapentin dose for GFR  -We will give PRN Ativan and Percocet with caution    #Acute on chronic anemia-likely due to advanced CKD. -Check iron stores.   -ARUN per nephrology    DVT Prophylaxis: Heparin subcu  Diet: Diet NPO, After Midnight  Code Status: Prior    PT/OT Eval Status: Ordered    Dispo -ECF when stable in Jasmin Romano MD

## 2020-05-04 NOTE — ANESTHESIA PRE PROCEDURE
by mouth 3 times daily 5/3/19  Yes Edward Ford MD   amLODIPine (NORVASC) 10 MG tablet Take 10 mg by mouth daily   Yes Historical Provider, MD   calcitRIOL (ROCALTROL) 0.25 MCG capsule Take 1 capsule by mouth daily 4/2/16  Yes Darylene Myke, DO   gabapentin (NEURONTIN) 300 MG capsule Take 1 capsule by mouth 3 times daily 4/26/15  Yes Historical Provider, MD       Current medications:    Current Facility-Administered Medications   Medication Dose Route Frequency Provider Last Rate Last Dose    0.9 % sodium chloride infusion admixture  25 mL Intravenous Q12H Neetu aLnderos MD   Stopped at 05/04/20 0747    sodium chloride flush 0.9 % injection 10 mL  10 mL Intravenous PRN Izella Bullion, DO        insulin lispro (HUMALOG) injection vial 0-18 Units  0-18 Units Subcutaneous Q4H Leonardo Peacock MD        glucose (GLUTOSE) 40 % oral gel 15 g  15 g Oral PRN Leonardo Peacock MD        dextrose 50 % IV solution  12.5 g Intravenous PRN Leonardo Peacock MD        glucagon (rDNA) injection 1 mg  1 mg Intramuscular PRN Leonardo Peacock MD        dextrose 5 % solution  100 mL/hr Intravenous PRN Leonardo Peacock MD        gabapentin (NEURONTIN) capsule 100 mg  100 mg Oral TID Garima Rojas MD        vancomycin (VANCOCIN) intermittent dosing (placeholder)   Other RX Jone Orta MD        0.9 % sodium chloride infusion admixture  250 mL Intravenous Once Arch Favor, DO        ALPRAZolam Terr[place] Solomon) tablet 0.25 mg  0.25 mg Oral Nightly PRN Izella Bullion, DO        amLODIPine (NORVASC) tablet 10 mg  10 mg Oral Daily Izella Bullion, DO   10 mg at 05/03/20 2112    calcitRIOL (ROCALTROL) capsule 0.25 mcg  0.25 mcg Oral Daily Izella Bullion, DO   0.25 mcg at 05/03/20 2113    calcium acetate (PHOSLO) capsule 1,334 mg  1,334 mg Oral TID WC Izella Bullion, DO   1,334 mg at 05/03/20 2112    hydrALAZINE (APRESOLINE) tablet 50 mg  50 mg Oral TID Izella Bullion, DO        oxyCODONE-acetaminophen (PERCOCET)

## 2020-05-04 NOTE — PROCEDURES
margin and IV access was lost and unable to give fentanyl.     Final wound dimensions:  15x15 cm, debrided to a depth of up to 4 cm (deepest in the left lateral and bilateral superior areas)    Plan:  Q2 turning  hussein Tellez  OR for definitive debridement 5/4/20    Electronically signed by Pankaj Dennis MD on 5/3/20 at 11:16 PM EDT

## 2020-05-04 NOTE — ANESTHESIA POSTPROCEDURE EVALUATION
Department of Anesthesiology  Postprocedure Note    Patient: Bud Son  MRN: [id number]  YOB: 1950  Date of evaluation: 5/4/2020  Time:  7:54 PM     Procedure Summary     Date:  05/04/20 Room / Location:  [facility] OR  / [facility]    Anesthesia Start:  9837 Anesthesia Stop:  1847    Procedure:  [address] WITH TIME AVAIL AM (N/A ) Diagnosis:  (SACRAL WOUND)    Surgeon:  Jenn Morales MD Responsible Provider:  Kimberlyn Lomas MD    Anesthesia Type:  MAC ASA Status:  4          Anesthesia Type: MAC    Nuzhat Phase I:      Nuzhat Phase II:      Last vitals: Reviewed and per EMR flowsheets.        Anesthesia Post Evaluation    Patient location during evaluation: PACU  Patient participation: complete - patient participated  Level of consciousness: awake and alert  Airway patency: patent  Nausea & Vomiting: no nausea and no vomiting  Complications: no  Cardiovascular status: hemodynamically stable and blood pressure returned to baseline  Respiratory status: acceptable  Hydration status: euvolemic

## 2020-05-04 NOTE — PROGRESS NOTES
Pharmacy Consultation Note  (Antibiotic Dosing and Monitoring)    Initial consult date: 20  Consulting physician: Dr. Blanca Arvizu  Drug(s): Vancomycin   Indication: Sacral decubitus ulcer    Ht Readings from Last 1 Encounters:   20 5' 1\" (1.549 m)       Age/Gender IBW DW  Allergy Information   78 y.o.  female, 105.2 kg 47.3 kg 70.5 kg  Patient has no known allergies. Date  WBC BUN/sCr UOP (mL/kg/hr) Drug/Dose Time   Given Level(s)   (Time) Comments   5/3  (#1) 21.2 68/4.3 --- Vancomycin 2000 mg IV x 1 1619       (#2) 17.1 70/4.4 --- No dose ---       (#3)      Level @ 0600      (#4)            (#5)            (#6)            Other Antibiotics/Antifungals/Antivirals Start Date Stop Date Comments   Zosyn 3.375 g IV q 12 hr                          Estimated CrCL: 13 mL/min      Intake/Output Summary (Last 24 hours) at 2020 1427  Last data filed at 2020 1349  Gross per 24 hour   Intake 0 ml   Output 1000 ml   Net -1000 ml       Temp max: Temp (24hrs), Av.1 °F (36.2 °C), Min:96 °F (35.6 °C), Max:98.3 °F (36.8 °C)      Cultures:    Culture Date Result    Wound (sacral ulcer)  Proteus species, GNR, mixed gram positives                        Assessment:  · Consulted by Dr. Alfredo Burr to dose/monitor vancomycin  · Goal trough level:  15-20 mcg/mL  · Patient is a 77 yo/F with a PMH significant for DM, HTN, CKD, sarcoidosis, and recently tested positive for COVID-19 who presented to [facility] ([facility]) on 5/3 due to AMS. Admitted for sepsis evaluation, COVID-19, and sacral decubitus ulcer. Received vancomycin 2000 mg IV x 1 in ER. · sCr = 4.4 mg/dL (baseline varies from 3.3 - 5.6 mg/dL)    Plan:  · No vancomycin todays  · Obtain vancomycin level tomorrow morning (estimated half-life = 45 hours)s  · Due to poor renal function, will dose vancomycin based on levels. · Pharmacist will follow and monitor/adjust dosing as necessarys    Thank you for this consult.     Ramy Prakash, PharmD 2020 2:27 PM

## 2020-05-05 LAB
ALBUMIN SERPL-MCNC: 1.6 G/DL (ref 3.5–5.2)
ALP BLD-CCNC: 128 U/L (ref 35–104)
ALT SERPL-CCNC: 17 U/L (ref 0–32)
ANION GAP SERPL CALCULATED.3IONS-SCNC: 13 MMOL/L (ref 7–16)
ANISOCYTOSIS: ABNORMAL
AST SERPL-CCNC: 23 U/L (ref 0–31)
BASOPHILS ABSOLUTE: 0.1 E9/L (ref 0–0.2)
BASOPHILS RELATIVE PERCENT: 0.9 % (ref 0–2)
BILIRUB SERPL-MCNC: <0.2 MG/DL (ref 0–1.2)
BUN BLDV-MCNC: 70 MG/DL (ref 8–23)
CALCIUM SERPL-MCNC: 7.6 MG/DL (ref 8.6–10.2)
CHLORIDE BLD-SCNC: 109 MMOL/L (ref 98–107)
CO2: 21 MMOL/L (ref 22–29)
CREAT SERPL-MCNC: 4.4 MG/DL (ref 0.5–1)
EOSINOPHILS ABSOLUTE: 0.19 E9/L (ref 0.05–0.5)
EOSINOPHILS RELATIVE PERCENT: 1.7 % (ref 0–6)
GFR AFRICAN AMERICAN: 12
GFR NON-AFRICAN AMERICAN: 10 ML/MIN/1.73
GLUCOSE BLD-MCNC: 124 MG/DL (ref 74–99)
GRAM STAIN ORDERABLE: NORMAL
HCT VFR BLD CALC: 25.4 % (ref 34–48)
HEMOGLOBIN: 8 G/DL (ref 11.5–15.5)
LYMPHOCYTES ABSOLUTE: 0.33 E9/L (ref 1.5–4)
LYMPHOCYTES RELATIVE PERCENT: 3.4 % (ref 20–42)
MAGNESIUM: 1.9 MG/DL (ref 1.6–2.6)
MCH RBC QN AUTO: 29.7 PG (ref 26–35)
MCHC RBC AUTO-ENTMCNC: 31.5 % (ref 32–34.5)
MCV RBC AUTO: 94.4 FL (ref 80–99.9)
METER GLUCOSE: 122 MG/DL (ref 74–99)
METER GLUCOSE: 133 MG/DL (ref 74–99)
METER GLUCOSE: 206 MG/DL (ref 74–99)
METER GLUCOSE: 291 MG/DL (ref 74–99)
METER GLUCOSE: 68 MG/DL (ref 74–99)
METER GLUCOSE: 72 MG/DL (ref 74–99)
MONOCYTES ABSOLUTE: 0.33 E9/L (ref 0.1–0.95)
MONOCYTES RELATIVE PERCENT: 2.6 % (ref 2–12)
NEUTROPHILS ABSOLUTE: 10.01 E9/L (ref 1.8–7.3)
NEUTROPHILS RELATIVE PERCENT: 91.4 % (ref 43–80)
PARATHYROID HORMONE INTACT: 256 PG/ML (ref 15–65)
PDW BLD-RTO: 15.9 FL (ref 11.5–15)
PHOSPHORUS: 4.9 MG/DL (ref 2.5–4.5)
PLATELET # BLD: 251 E9/L (ref 130–450)
PMV BLD AUTO: 10.5 FL (ref 7–12)
POLYCHROMASIA: ABNORMAL
POTASSIUM SERPL-SCNC: 5.1 MMOL/L (ref 3.5–5)
RBC # BLD: 2.69 E12/L (ref 3.5–5.5)
SODIUM BLD-SCNC: 143 MMOL/L (ref 132–146)
TOTAL PROTEIN: 5.1 G/DL (ref 6.4–8.3)
VANCOMYCIN RANDOM: 9.6 MCG/ML (ref 5–40)
WBC # BLD: 11 E9/L (ref 4.5–11.5)

## 2020-05-05 PROCEDURE — 84100 ASSAY OF PHOSPHORUS: CPT

## 2020-05-05 PROCEDURE — 6370000000 HC RX 637 (ALT 250 FOR IP): Performed by: STUDENT IN AN ORGANIZED HEALTH CARE EDUCATION/TRAINING PROGRAM

## 2020-05-05 PROCEDURE — 80202 ASSAY OF VANCOMYCIN: CPT

## 2020-05-05 PROCEDURE — 83735 ASSAY OF MAGNESIUM: CPT

## 2020-05-05 PROCEDURE — 6360000002 HC RX W HCPCS: Performed by: STUDENT IN AN ORGANIZED HEALTH CARE EDUCATION/TRAINING PROGRAM

## 2020-05-05 PROCEDURE — 2580000003 HC RX 258: Performed by: STUDENT IN AN ORGANIZED HEALTH CARE EDUCATION/TRAINING PROGRAM

## 2020-05-05 PROCEDURE — 2700000000 HC OXYGEN THERAPY PER DAY

## 2020-05-05 PROCEDURE — 2140000000 HC CCU INTERMEDIATE R&B

## 2020-05-05 PROCEDURE — 2580000003 HC RX 258

## 2020-05-05 PROCEDURE — 6360000002 HC RX W HCPCS

## 2020-05-05 PROCEDURE — 82962 GLUCOSE BLOOD TEST: CPT

## 2020-05-05 PROCEDURE — 36415 COLL VENOUS BLD VENIPUNCTURE: CPT

## 2020-05-05 PROCEDURE — 85025 COMPLETE CBC W/AUTO DIFF WBC: CPT

## 2020-05-05 PROCEDURE — 83970 ASSAY OF PARATHORMONE: CPT

## 2020-05-05 PROCEDURE — 80053 COMPREHEN METABOLIC PANEL: CPT

## 2020-05-05 PROCEDURE — 5A1D70Z PERFORMANCE OF URINARY FILTRATION, INTERMITTENT, LESS THAN 6 HOURS PER DAY: ICD-10-PCS | Performed by: INTERNAL MEDICINE

## 2020-05-05 RX ADMIN — CALCIUM ACETATE 1334 MG: 667 CAPSULE ORAL at 14:56

## 2020-05-05 RX ADMIN — PIPERACILLIN AND TAZOBACTAM 3.38 G: 3; .375 INJECTION, POWDER, FOR SOLUTION INTRAVENOUS at 10:32

## 2020-05-05 RX ADMIN — SODIUM BICARBONATE 1300 MG: 650 TABLET ORAL at 20:59

## 2020-05-05 RX ADMIN — Medication: at 10:41

## 2020-05-05 RX ADMIN — Medication 500 MG: at 10:31

## 2020-05-05 RX ADMIN — INSULIN LISPRO 9 UNITS: 100 INJECTION, SOLUTION INTRAVENOUS; SUBCUTANEOUS at 21:35

## 2020-05-05 RX ADMIN — PIPERACILLIN AND TAZOBACTAM 3.38 G: 3; .375 INJECTION, POWDER, FOR SOLUTION INTRAVENOUS at 20:59

## 2020-05-05 RX ADMIN — GABAPENTIN 100 MG: 100 CAPSULE ORAL at 20:59

## 2020-05-05 RX ADMIN — VANCOMYCIN HYDROCHLORIDE 1.5 G: 10 INJECTION, POWDER, LYOPHILIZED, FOR SOLUTION INTRAVENOUS at 16:32

## 2020-05-05 RX ADMIN — HEPARIN SODIUM 5000 UNITS: 10000 INJECTION INTRAVENOUS; SUBCUTANEOUS at 13:30

## 2020-05-05 RX ADMIN — CALCIUM ACETATE 1334 MG: 667 CAPSULE ORAL at 16:32

## 2020-05-05 RX ADMIN — CALCIUM ACETATE 1334 MG: 667 CAPSULE ORAL at 10:32

## 2020-05-05 RX ADMIN — CALCITRIOL 0.25 MCG: 0.25 CAPSULE ORAL at 10:31

## 2020-05-05 RX ADMIN — GABAPENTIN 100 MG: 100 CAPSULE ORAL at 10:32

## 2020-05-05 RX ADMIN — SODIUM BICARBONATE 1300 MG: 650 TABLET ORAL at 10:31

## 2020-05-05 RX ADMIN — AMLODIPINE BESYLATE 10 MG: 10 TABLET ORAL at 10:32

## 2020-05-05 RX ADMIN — OXYCODONE HYDROCHLORIDE AND ACETAMINOPHEN 2 TABLET: 5; 325 TABLET ORAL at 18:30

## 2020-05-05 RX ADMIN — INSULIN LISPRO 6 UNITS: 100 INJECTION, SOLUTION INTRAVENOUS; SUBCUTANEOUS at 01:26

## 2020-05-05 RX ADMIN — HEPARIN SODIUM 5000 UNITS: 10000 INJECTION INTRAVENOUS; SUBCUTANEOUS at 05:30

## 2020-05-05 RX ADMIN — GABAPENTIN 100 MG: 100 CAPSULE ORAL at 14:56

## 2020-05-05 RX ADMIN — HEPARIN SODIUM 5000 UNITS: 10000 INJECTION INTRAVENOUS; SUBCUTANEOUS at 21:15

## 2020-05-05 RX ADMIN — HYDRALAZINE HYDROCHLORIDE 50 MG: 25 TABLET, FILM COATED ORAL at 20:59

## 2020-05-05 RX ADMIN — OXYCODONE HYDROCHLORIDE AND ACETAMINOPHEN 2 TABLET: 5; 325 TABLET ORAL at 10:31

## 2020-05-05 RX ADMIN — HYDRALAZINE HYDROCHLORIDE 50 MG: 25 TABLET, FILM COATED ORAL at 14:56

## 2020-05-05 RX ADMIN — SODIUM CHLORIDE, PRESERVATIVE FREE 10 ML: 5 INJECTION INTRAVENOUS at 21:00

## 2020-05-05 RX ADMIN — HYDRALAZINE HYDROCHLORIDE 50 MG: 25 TABLET, FILM COATED ORAL at 10:31

## 2020-05-05 RX ADMIN — Medication 50 MG: at 10:31

## 2020-05-05 ASSESSMENT — PAIN DESCRIPTION - LOCATION
LOCATION: HIP
LOCATION: BUTTOCKS;KNEE
LOCATION: SACRUM;KNEE
LOCATION: BUTTOCKS;KNEE

## 2020-05-05 ASSESSMENT — PAIN - FUNCTIONAL ASSESSMENT
PAIN_FUNCTIONAL_ASSESSMENT: PREVENTS OR INTERFERES SOME ACTIVE ACTIVITIES AND ADLS

## 2020-05-05 ASSESSMENT — PAIN DESCRIPTION - DESCRIPTORS
DESCRIPTORS: CONSTANT;ACHING;DISCOMFORT
DESCRIPTORS: ACHING
DESCRIPTORS: ACHING;CONSTANT;DISCOMFORT
DESCRIPTORS: ACHING;CRUSHING;DISCOMFORT

## 2020-05-05 ASSESSMENT — PAIN SCALES - GENERAL
PAINLEVEL_OUTOF10: 3
PAINLEVEL_OUTOF10: 6
PAINLEVEL_OUTOF10: 2
PAINLEVEL_OUTOF10: 5
PAINLEVEL_OUTOF10: 7
PAINLEVEL_OUTOF10: 0
PAINLEVEL_OUTOF10: 7
PAINLEVEL_OUTOF10: 5

## 2020-05-05 ASSESSMENT — PAIN DESCRIPTION - PROGRESSION
CLINICAL_PROGRESSION: NOT CHANGED

## 2020-05-05 ASSESSMENT — PAIN DESCRIPTION - FREQUENCY
FREQUENCY: CONTINUOUS

## 2020-05-05 ASSESSMENT — PAIN DESCRIPTION - ORIENTATION
ORIENTATION: RIGHT;LEFT
ORIENTATION: RIGHT;LEFT
ORIENTATION: LEFT;RIGHT
ORIENTATION: LEFT;RIGHT

## 2020-05-05 ASSESSMENT — PAIN DESCRIPTION - ONSET
ONSET: ON-GOING

## 2020-05-05 ASSESSMENT — PAIN DESCRIPTION - PAIN TYPE
TYPE: CHRONIC PAIN;SURGICAL PAIN

## 2020-05-05 NOTE — PROGRESS NOTES
Pharmacy Consultation Note  (Antibiotic Dosing and Monitoring)    Initial consult date: 20  Consulting physician: Dr. Blanca Arvizu  Drug(s): Vancomycin   Indication: Sacral decubitus ulcer    Ht Readings from Last 1 Encounters:   20 5' 1\" (1.549 m)       Age/Gender IBW DW  Allergy Information   78 y.o.  female, 105.2 kg 47.3 kg 70.5 kg  Patient has no known allergies. Date  WBC BUN/sCr UOP (mL/kg/hr) Drug/Dose Time   Given Level(s)   (Time) Comments   5/3  (#1) 21.2 68/4.3 --- Vancomycin 2000 mg IV x 1 1619       (#2) 17.1 70/4.4 --- No dose ---       (#3) 11 70/4.4 --- Vancomycin 1500 mg IV x 1 <1500> Level = 9.6 @ 1230 Level originally scheduled to be drawn at 0600     (#4)      Level @ 0600      (#5)            (#6)            Other Antibiotics/Antifungals/Antivirals Start Date Stop Date Comments   Zosyn 3.375 g IV q 12 hr                          Estimated CrCL: 13 mL/min      Intake/Output Summary (Last 24 hours) at 2020 1330  Last data filed at 2020 2243  Gross per 24 hour   Intake 1251.66 ml   Output 625 ml   Net 626.66 ml       Temp max: Temp (24hrs), Av.5 °F (36.4 °C), Min:96.7 °F (35.9 °C), Max:98.3 °F (36.8 °C)      Cultures:    Culture Date Result    Wound (sacral ulcer)  Proteus species, nonhemolytic strep species, corynebacterium, staph aureus   Urine  Streptococcus species   45605 CFU/mL   Blood #1 5/3 No growth   Blood #2 5/3 No growth       Assessment:  · Consulted by Dr. Alfredo Burr to dose/monitor vancomycin  · Goal trough level:  15-20 mcg/mL  · Patient is a 79 yo/F with a PMH significant for DM, HTN, CKD, sarcoidosis, and recently tested positive for COVID-19 who presented to [facility] ([facility] on 5/3 due to AMS. Admitted for sepsis evaluation, COVID-19, and sacral decubitus ulcer. Received vancomycin 2000 mg IV x 1 in ER. · sCr = 4.4 mg/dL (baseline varies from 3.3 - 5.6 mg/dL)  · : Random vancomycin level = 9.6 mcg/mL. Plan:  · Vancomycin 1500 mg IV x 1.

## 2020-05-05 NOTE — DISCHARGE INSTR - COC
Continuity of Care Form    Patient Name: Gerardo Madison   :  1950  MRN:  [id number]    Admit date:  5/3/2020  Discharge date:  2020      Code Status Order: Prior   Advance Directives:   [facility] Documentation     Date/Time Healthcare Directive Type of Healthcare Directive Copy in [facility] [address] Agent's Name Healthcare Agent's Phone Number    20 1643  No, patient does not have an advance directive for healthcare treatment -- -- -- -- --    20 1853  No, patient does not have an advance directive for healthcare treatment -- -- -- -- --          Admitting Physician:  Timmy Fountain DO  PCP: Lucy Manjarrez MD    Discharging Nurse: Maye Bell Unit/Room#: 4667/5409-T  Discharging Unit Phone Number: [phone]    Emergency Contact:   Extended Emergency Contact Information  Primary Emergency Contact: Johnson Mcguire   [address] Phone: [phone]  Relation: Child  Secondary Emergency Contact: Gav[place] [address] Phone: [phone]  Relation: Child    Past Surgical History:  Past Surgical History:   Procedure Laterality Date    ABDOMEN SURGERY N/A 2020    SACRAL WOUND Nida Pearson DR. WITH TIME AVAIL AM performed by Laurel Alexander MD at [address] [place] [address]  3/31/16    Laparoscopic-Dr. Yola Mccall    CYSTOSCOPY       for kidney stones    FOOT SURGERY       right     UPPER GASTROINTESTINAL ENDOSCOPY  2.18.15    Dr. Hans Barnett Findings: Mild Gastrits and Duodenitis, 2cm Hiatal Hernia    UPPER GASTROINTESTINAL ENDOSCOPY N/A 2020    EGD CONTROL HEMORRHAGE performed by Laurel Alexander MD at [address] [place] [address]       Immunization History: There is no immunization history for the selected administration types on file for this patient.     Active Problems:  Patient Active Problem List   Diagnosis Code    Neurologic gait dysfunction R26.9    Renal Assisted  Med Delivery   none    Wound Care Documentation and Therapy:  Wound 07/18/19 Ankle Left (Active)   Number of days: 299       Wound 05/03/20 Sacrum (Active)   Wound Image   5/13/2020  8:30 AM   Wound Pressure Stage  4 5/13/2020  8:30 AM   Dressing Status Changed 5/13/2020  6:00 AM   Dressing Changed Changed/New 5/13/2020  8:30 AM   Dressing/Treatment Moist to dry;Dry dressing 5/13/2020  8:30 AM   Wound Cleansed Rinsed/Irrigated with saline 5/13/2020  8:30 AM   Dressing Change Due 05/13/20 5/13/2020  6:00 AM   Wound Length (cm) 21 cm 5/13/2020  8:30 AM   Wound Width (cm) 26 cm 5/13/2020  8:30 AM   Wound Depth (cm) 5 cm 5/13/2020  8:30 AM   Wound Surface Area (cm^2) 546 cm^2 5/13/2020  8:30 AM   Change in Wound Size % (l*w) -98.55 5/13/2020  8:30 AM   Wound Volume (cm^3) 2730 cm^3 5/13/2020  8:30 AM   Wound Healing % -31 5/13/2020  8:30 AM   Undermining Starts ___ O'Clock 7 5/13/2020  8:30 AM   Undermining Ends___ O'Clock 11 5/13/2020  8:30 AM   Undermining Maxium Distance (cm) 5 5/13/2020  8:30 AM   Wound Assessment Red;Yellow 5/13/2020  8:30 AM   Drainage Amount Small 5/13/2020  8:30 AM   Drainage Description Serosanguinous 5/13/2020  8:30 AM   Odor None 5/13/2020  8:30 AM   Margins Epibole (rolled edges) 5/13/2020  6:00 AM   Exposed structure Bone 5/13/2020  8:30 AM   Carmen-wound Assessment Intact 5/13/2020  8:30 AM   [place]%Wound Bed 50 5/5/2020 11:29 AM   Red%Wound Bed 80 5/13/2020  8:30 AM   Yellow%Wound Bed 20 5/13/2020  8:30 AM   Number of days: 9       Wound 05/03/20 Abdomen Lower;Medial (Active)   Wound Image   5/4/2020  3:50 PM   Dressing Status Clean;Dry; Intact 5/10/2020 10:40 PM   Dressing Changed Changed/New 5/13/2020  6:00 AM   Dressing/Treatment Alginate;Dry dressing 5/13/2020  8:30 AM   Wound Cleansed Rinsed/Irrigated with saline 5/13/2020  4:00 AM   Dressing Change Due 05/13/20 5/13/2020  6:00 AM   Wound Length (cm) 2 cm 5/13/2020  8:30 AM   Wound Width (cm) 6 cm 5/13/2020  8:30 AM   Wound

## 2020-05-05 NOTE — PROGRESS NOTES
Nutrition Assessment    Type and Reason for Visit: Initial, Consult, Positive Nutrition Screen    Nutrition Recommendations: Recommend and start Ensure High Protein supplement (vanilla) TID to help meet increased nutritional needs. Also recommend Boost Pudding supplement BID for additional calories, protein, and nutrients. Jono wound healing supplement not indicated at this time d/t sepsis. Monitor renal lab values as available and adjust diet as needed. Nutrition Assessment: Pt from nursing home ; Patient at nutritional risk d/t increased needs from wound healing (stage III and IV wounds) ; pt also COVID-19 positive ; noted sepsis ; questionable hx of dialysis ; Malnutrition Assessment:  · Malnutrition Status: Insufficient data  · Context: Acute illness or injury  · Findings of the 6 clinical characteristics of malnutrition (Minimum of 2 out of 6 clinical characteristics is required to make the diagnosis of moderate or severe Protein Calorie Malnutrition based on AND/ASPEN Guidelines):  1. Energy Intake-Greater than 75% of estimated energy requirement, (x 3 days since adm)    2. Weight Loss-Unable to assess, unable to assess  3. Fat Loss-Unable to assess(data not available to assess at this time),    4. Muscle Loss-Unable to assess(data not available to assess at this time),    5. Fluid Accumulation-No significant fluid accumulation,    6.  Strength-Not measured    Nutrition Risk Level:  Moderate    Nutrient Needs:  · Estimated Daily Total Kcal: 7924-5720 (REE 1512 x 1.3 SF)  · Estimated Daily Protein (g): 120-130 (2.5g/kg IBW)  · Estimated Daily Total Fluid (ml/day): 3704-2813    Nutrition Diagnosis:   · Problem: Increased nutrient needs  · Etiology: related to Increased demand for energy/nutrients     Signs and symptoms:  as evidenced by Presence of wounds    Objective Information:  · Nutrition-Focused Physical Findings: I&Os WNL, trace edema, active BS, rounded abd, missing teeth, A&O x

## 2020-05-05 NOTE — PROGRESS NOTES
Hospitalist Progress Note      PCP: Nancy Ryan MD    Date of Admission: 5/3/2020    Hospital Course: 76-year-old female who presented with confusion. She had recently tested positive for COVID. She was also noted to have worsening sacral decubitus ulcer status post debridement on 5/3/2020. Subjective:  No events overnight. Patient had surgery yesterday. Appetite is fine. Afebrile    Medications:  Reviewed    Infusion Medications    dextrose      sodium chloride 100 mL/hr at 05/04/20 2157     Scheduled Medications    sodium chloride  25 mL Intravenous Q12H    insulin lispro  0-18 Units Subcutaneous Q4H    gabapentin  100 mg Oral TID    vancomycin (VANCOCIN) intermittent dosing (placeholder)   Other RX Placeholder    sodium hypochlorite   Irrigation BID    epoetin derek-epbx  3,000 Units Subcutaneous Once per day on Mon Wed Fri    amLODIPine  10 mg Oral Daily    calcitRIOL  0.25 mcg Oral Daily    calcium acetate  1,334 mg Oral TID WC    hydrALAZINE  50 mg Oral TID    sodium bicarbonate  1,300 mg Oral BID    vitamin C  500 mg Oral Daily    zinc  50 mg Oral Daily    heparin (porcine)  5,000 Units Subcutaneous Q8H    piperacillin-tazobactam  3.375 g Intravenous Q12H     PRN Meds: HYDROmorphone, sodium chloride flush, glucose, dextrose, glucagon (rDNA), dextrose, oxyCODONE-acetaminophen **OR** oxyCODONE-acetaminophen, ALPRAZolam, polyethylene glycol, acetaminophen      Intake/Output Summary (Last 24 hours) at 5/5/2020 0909  Last data filed at 5/4/2020 2243  Gross per 24 hour   Intake 1701.66 ml   Output 625 ml   Net 1076.66 ml       Physical Exam Performed:    BP (!) 117/59   Pulse 80   Temp 97.7 °F (36.5 °C) (Temporal)   Resp 18   Ht 5' 1\" (1.549 m)   Wt 232 lb (105.2 kg)   SpO2 96%   BMI 43.84 kg/m²     General appearance: No apparent distress, appears stated age and cooperative. Obese female  HEENT: Pupils equal, round, and reactive to light. Conjunctivae/corneas clear.   Neck: Right IJ

## 2020-05-05 NOTE — PROGRESS NOTES
109 05/05/2020    CO2 21 05/05/2020    BUN 70 05/05/2020    LABALBU 1.6 05/05/2020    LABALBU 4.1 10/12/2011    CREATININE 4.4 05/05/2020    CALCIUM 7.6 05/05/2020    GFRAA 12 05/05/2020    LABGLOM 10 05/05/2020    GLUCOSE 124 05/05/2020    GLUCOSE 149 10/12/2011     Magnesium:    Lab Results   Component Value Date    MG 1.9 05/05/2020     Phosphorus:    Lab Results   Component Value Date    PHOS 4.9 05/05/2020         Assessment/Plan  1. CKD 4/5  Baseline 4.2, eGFR 10-12 ml/min in 2/2020, 3.1 in 8/2019  Serum cr 4.3->4.4   Follow on IVF  4/11/20 JOSUÉ showed no hydronephrosis  Daily bmp  No immediate plans for HD        2. Sacral wound infection  S/p bedside sharp excisional debridement of sacral pressure ulcer on 05/03  Wound culture showing mixed gram-positive organisms, gram-negative rods, Proteus species. On piperacillin-tazobactam, vancomycin   ID following  S/P debridement 5/4    3. Sepsis  SARS-CoV-2 positive  On IVF, piperacillin-tazobactam, vancomycin   Blood, urine cultures pending  ID following    4. Anemia of CKD  Hgb 7.1 -> 6.1 - s/p 2 units pRBCs  Start ARUN   Iron sat, TIBC, folate low, no iron supplementation with infectionTransfuse for hgb below 7.    5. Secondary hyperparathyrodism of renal origin  Follow closely on vit d analogue due to sarcoid and h/o stones  Ca 8.2, check PO4  PTH 2/1/20 was 336, will re-check  On rocaltrol, phoslo     6. Hyperkalemia with CKD  On admission 5.3, today 5.1  Will follow     7. Metabolic acidosis with CKD, sepsis  CO2 19 on admission, 25 today    6. HTN in CKD Stages I-IV  BP at target of <130/80  Follow on shavon patel MD

## 2020-05-06 LAB
ALBUMIN SERPL-MCNC: 1.9 G/DL (ref 3.5–5.2)
ALP BLD-CCNC: 137 U/L (ref 35–104)
ALT SERPL-CCNC: 17 U/L (ref 0–32)
ANION GAP SERPL CALCULATED.3IONS-SCNC: 14 MMOL/L (ref 7–16)
ANISOCYTOSIS: ABNORMAL
AST SERPL-CCNC: 22 U/L (ref 0–31)
BASOPHILS ABSOLUTE: 0 E9/L (ref 0–0.2)
BASOPHILS RELATIVE PERCENT: 0.4 % (ref 0–2)
BILIRUB SERPL-MCNC: <0.2 MG/DL (ref 0–1.2)
BUN BLDV-MCNC: 71 MG/DL (ref 8–23)
BURR CELLS: ABNORMAL
CALCIUM SERPL-MCNC: 8 MG/DL (ref 8.6–10.2)
CHLORIDE BLD-SCNC: 106 MMOL/L (ref 98–107)
CO2: 21 MMOL/L (ref 22–29)
CREAT SERPL-MCNC: 4.4 MG/DL (ref 0.5–1)
D DIMER: 720 NG/ML DDU
EKG ATRIAL RATE: 83 BPM
EKG P AXIS: 60 DEGREES
EKG P-R INTERVAL: 210 MS
EKG Q-T INTERVAL: 444 MS
EKG QRS DURATION: 136 MS
EKG QTC CALCULATION (BAZETT): 521 MS
EKG R AXIS: -30 DEGREES
EKG T AXIS: 84 DEGREES
EKG VENTRICULAR RATE: 83 BPM
EOSINOPHILS ABSOLUTE: 0.21 E9/L (ref 0.05–0.5)
EOSINOPHILS RELATIVE PERCENT: 1.8 % (ref 0–6)
FERRITIN: 820 NG/ML
GFR AFRICAN AMERICAN: 12
GFR NON-AFRICAN AMERICAN: 10 ML/MIN/1.73
GLUCOSE BLD-MCNC: 82 MG/DL (ref 74–99)
GRAM STAIN RESULT: ABNORMAL
HCT VFR BLD CALC: 27.9 % (ref 34–48)
HEMOGLOBIN: 8.6 G/DL (ref 11.5–15.5)
HYPOCHROMIA: ABNORMAL
LACTATE DEHYDROGENASE: 218 U/L (ref 135–214)
LYMPHOCYTES ABSOLUTE: 0.94 E9/L (ref 1.5–4)
LYMPHOCYTES RELATIVE PERCENT: 8 % (ref 20–42)
MAGNESIUM: 2 MG/DL (ref 1.6–2.6)
MCH RBC QN AUTO: 29.5 PG (ref 26–35)
MCHC RBC AUTO-ENTMCNC: 30.8 % (ref 32–34.5)
MCV RBC AUTO: 95.5 FL (ref 80–99.9)
METER GLUCOSE: 104 MG/DL (ref 74–99)
METER GLUCOSE: 115 MG/DL (ref 74–99)
METER GLUCOSE: 144 MG/DL (ref 74–99)
METER GLUCOSE: 186 MG/DL (ref 74–99)
METER GLUCOSE: 369 MG/DL (ref 74–99)
MONOCYTES ABSOLUTE: 0.59 E9/L (ref 0.1–0.95)
MONOCYTES RELATIVE PERCENT: 5.3 % (ref 2–12)
MYELOCYTE PERCENT: 0.9 % (ref 0–0)
NEUTROPHILS ABSOLUTE: 9.91 E9/L (ref 1.8–7.3)
NEUTROPHILS RELATIVE PERCENT: 83.2 % (ref 43–80)
ORGANISM: ABNORMAL
ORGANISM: ABNORMAL
PDW BLD-RTO: 15.4 FL (ref 11.5–15)
PLATELET # BLD: 300 E9/L (ref 130–450)
PMV BLD AUTO: 10.3 FL (ref 7–12)
POIKILOCYTES: ABNORMAL
POLYCHROMASIA: ABNORMAL
POTASSIUM SERPL-SCNC: 5.1 MMOL/L (ref 3.5–5)
PROCALCITONIN: 3.6 NG/ML (ref 0–0.08)
PROMYELOCYTES PERCENT: 0.9 % (ref 0–0)
RBC # BLD: 2.92 E12/L (ref 3.5–5.5)
SODIUM BLD-SCNC: 141 MMOL/L (ref 132–146)
TOTAL PROTEIN: 5.4 G/DL (ref 6.4–8.3)
URINE CULTURE, ROUTINE: ABNORMAL
URINE CULTURE, ROUTINE: ABNORMAL
VANCOMYCIN RANDOM: 30.9 MCG/ML (ref 5–40)
WBC # BLD: 11.8 E9/L (ref 4.5–11.5)
WOUND/ABSCESS: ABNORMAL
WOUND/ABSCESS: ABNORMAL

## 2020-05-06 PROCEDURE — 80202 ASSAY OF VANCOMYCIN: CPT

## 2020-05-06 PROCEDURE — 6360000002 HC RX W HCPCS

## 2020-05-06 PROCEDURE — 83615 LACTATE (LD) (LDH) ENZYME: CPT

## 2020-05-06 PROCEDURE — 6370000000 HC RX 637 (ALT 250 FOR IP): Performed by: STUDENT IN AN ORGANIZED HEALTH CARE EDUCATION/TRAINING PROGRAM

## 2020-05-06 PROCEDURE — 6360000002 HC RX W HCPCS: Performed by: STUDENT IN AN ORGANIZED HEALTH CARE EDUCATION/TRAINING PROGRAM

## 2020-05-06 PROCEDURE — 2580000003 HC RX 258: Performed by: STUDENT IN AN ORGANIZED HEALTH CARE EDUCATION/TRAINING PROGRAM

## 2020-05-06 PROCEDURE — 93010 ELECTROCARDIOGRAM REPORT: CPT | Performed by: INTERNAL MEDICINE

## 2020-05-06 PROCEDURE — 2140000000 HC CCU INTERMEDIATE R&B

## 2020-05-06 PROCEDURE — 83735 ASSAY OF MAGNESIUM: CPT

## 2020-05-06 PROCEDURE — 36415 COLL VENOUS BLD VENIPUNCTURE: CPT

## 2020-05-06 PROCEDURE — 82728 ASSAY OF FERRITIN: CPT

## 2020-05-06 PROCEDURE — 2700000000 HC OXYGEN THERAPY PER DAY

## 2020-05-06 PROCEDURE — 85378 FIBRIN DEGRADE SEMIQUANT: CPT

## 2020-05-06 PROCEDURE — 93005 ELECTROCARDIOGRAM TRACING: CPT | Performed by: STUDENT IN AN ORGANIZED HEALTH CARE EDUCATION/TRAINING PROGRAM

## 2020-05-06 PROCEDURE — 85025 COMPLETE CBC W/AUTO DIFF WBC: CPT

## 2020-05-06 PROCEDURE — 84145 PROCALCITONIN (PCT): CPT

## 2020-05-06 PROCEDURE — 6360000002 HC RX W HCPCS: Performed by: INTERNAL MEDICINE

## 2020-05-06 PROCEDURE — 80053 COMPREHEN METABOLIC PANEL: CPT

## 2020-05-06 PROCEDURE — 82962 GLUCOSE BLOOD TEST: CPT

## 2020-05-06 RX ORDER — HEPARIN SODIUM (PORCINE) LOCK FLUSH IV SOLN 100 UNIT/ML 100 UNIT/ML
3 SOLUTION INTRAVENOUS PRN
Status: DISCONTINUED | OUTPATIENT
Start: 2020-05-06 | End: 2020-05-13 | Stop reason: HOSPADM

## 2020-05-06 RX ORDER — SODIUM CHLORIDE 0.9 % (FLUSH) 0.9 %
10 SYRINGE (ML) INJECTION PRN
Status: DISCONTINUED | OUTPATIENT
Start: 2020-05-06 | End: 2020-05-13 | Stop reason: HOSPADM

## 2020-05-06 RX ORDER — HEPARIN SODIUM (PORCINE) LOCK FLUSH IV SOLN 100 UNIT/ML 100 UNIT/ML
SOLUTION INTRAVENOUS
Status: COMPLETED
Start: 2020-05-06 | End: 2020-05-06

## 2020-05-06 RX ORDER — LIDOCAINE HYDROCHLORIDE 10 MG/ML
5 INJECTION, SOLUTION EPIDURAL; INFILTRATION; INTRACAUDAL; PERINEURAL ONCE
Status: DISCONTINUED | OUTPATIENT
Start: 2020-05-06 | End: 2020-05-13 | Stop reason: HOSPADM

## 2020-05-06 RX ORDER — HEPARIN SODIUM (PORCINE) LOCK FLUSH IV SOLN 100 UNIT/ML 100 UNIT/ML
3 SOLUTION INTRAVENOUS EVERY 12 HOURS SCHEDULED
Status: DISCONTINUED | OUTPATIENT
Start: 2020-05-06 | End: 2020-05-13 | Stop reason: HOSPADM

## 2020-05-06 RX ADMIN — AMLODIPINE BESYLATE 10 MG: 10 TABLET ORAL at 09:50

## 2020-05-06 RX ADMIN — HYDRALAZINE HYDROCHLORIDE 50 MG: 25 TABLET, FILM COATED ORAL at 20:27

## 2020-05-06 RX ADMIN — SODIUM CHLORIDE, PRESERVATIVE FREE 300 UNITS: 5 INJECTION INTRAVENOUS at 20:29

## 2020-05-06 RX ADMIN — SODIUM BICARBONATE 1300 MG: 650 TABLET ORAL at 09:49

## 2020-05-06 RX ADMIN — CALCIUM ACETATE 1334 MG: 667 CAPSULE ORAL at 09:49

## 2020-05-06 RX ADMIN — GABAPENTIN 100 MG: 100 CAPSULE ORAL at 13:13

## 2020-05-06 RX ADMIN — SODIUM CHLORIDE: 9 INJECTION, SOLUTION INTRAVENOUS at 13:13

## 2020-05-06 RX ADMIN — Medication: at 00:21

## 2020-05-06 RX ADMIN — OXYCODONE HYDROCHLORIDE AND ACETAMINOPHEN 2 TABLET: 5; 325 TABLET ORAL at 16:12

## 2020-05-06 RX ADMIN — HEPARIN SODIUM 5000 UNITS: 10000 INJECTION INTRAVENOUS; SUBCUTANEOUS at 05:30

## 2020-05-06 RX ADMIN — SODIUM CHLORIDE, PRESERVATIVE FREE 10 ML: 5 INJECTION INTRAVENOUS at 09:51

## 2020-05-06 RX ADMIN — PIPERACILLIN AND TAZOBACTAM 3.38 G: 3; .375 INJECTION, POWDER, FOR SOLUTION INTRAVENOUS at 09:51

## 2020-05-06 RX ADMIN — PIPERACILLIN AND TAZOBACTAM 3.38 G: 3; .375 INJECTION, POWDER, FOR SOLUTION INTRAVENOUS at 22:09

## 2020-05-06 RX ADMIN — INSULIN LISPRO 3 UNITS: 100 INJECTION, SOLUTION INTRAVENOUS; SUBCUTANEOUS at 01:35

## 2020-05-06 RX ADMIN — CALCIUM ACETATE 1334 MG: 667 CAPSULE ORAL at 13:12

## 2020-05-06 RX ADMIN — Medication 500 MG: at 09:50

## 2020-05-06 RX ADMIN — INSULIN LISPRO 15 UNITS: 100 INJECTION, SOLUTION INTRAVENOUS; SUBCUTANEOUS at 13:48

## 2020-05-06 RX ADMIN — CALCIUM ACETATE 1334 MG: 667 CAPSULE ORAL at 18:04

## 2020-05-06 RX ADMIN — OXYCODONE HYDROCHLORIDE AND ACETAMINOPHEN 2 TABLET: 5; 325 TABLET ORAL at 20:26

## 2020-05-06 RX ADMIN — INSULIN LISPRO 3 UNITS: 100 INJECTION, SOLUTION INTRAVENOUS; SUBCUTANEOUS at 16:00

## 2020-05-06 RX ADMIN — SODIUM BICARBONATE 1300 MG: 650 TABLET ORAL at 20:28

## 2020-05-06 RX ADMIN — Medication 500 UNITS: at 05:20

## 2020-05-06 RX ADMIN — Medication 50 MG: at 09:49

## 2020-05-06 RX ADMIN — HEPARIN SODIUM 5000 UNITS: 10000 INJECTION INTRAVENOUS; SUBCUTANEOUS at 21:00

## 2020-05-06 RX ADMIN — HEPARIN SODIUM 5000 UNITS: 10000 INJECTION INTRAVENOUS; SUBCUTANEOUS at 13:54

## 2020-05-06 RX ADMIN — HYDRALAZINE HYDROCHLORIDE 50 MG: 25 TABLET, FILM COATED ORAL at 13:13

## 2020-05-06 RX ADMIN — GABAPENTIN 100 MG: 100 CAPSULE ORAL at 20:26

## 2020-05-06 RX ADMIN — OXYCODONE HYDROCHLORIDE AND ACETAMINOPHEN 2 TABLET: 5; 325 TABLET ORAL at 09:56

## 2020-05-06 RX ADMIN — EPOETIN ALFA-EPBX 3000 UNITS: 3000 INJECTION, SOLUTION INTRAVENOUS; SUBCUTANEOUS at 09:50

## 2020-05-06 RX ADMIN — GABAPENTIN 100 MG: 100 CAPSULE ORAL at 09:51

## 2020-05-06 RX ADMIN — HYDRALAZINE HYDROCHLORIDE 50 MG: 25 TABLET, FILM COATED ORAL at 09:50

## 2020-05-06 RX ADMIN — CALCITRIOL 0.25 MCG: 0.25 CAPSULE ORAL at 09:50

## 2020-05-06 ASSESSMENT — PAIN DESCRIPTION - FREQUENCY
FREQUENCY: CONTINUOUS
FREQUENCY: CONTINUOUS

## 2020-05-06 ASSESSMENT — PAIN DESCRIPTION - ONSET
ONSET: ON-GOING
ONSET: ON-GOING

## 2020-05-06 ASSESSMENT — PAIN SCALES - GENERAL
PAINLEVEL_OUTOF10: 8
PAINLEVEL_OUTOF10: 7
PAINLEVEL_OUTOF10: 3
PAINLEVEL_OUTOF10: 8
PAINLEVEL_OUTOF10: 2
PAINLEVEL_OUTOF10: 8
PAINLEVEL_OUTOF10: 0
PAINLEVEL_OUTOF10: 2
PAINLEVEL_OUTOF10: 7

## 2020-05-06 ASSESSMENT — PAIN DESCRIPTION - PAIN TYPE
TYPE: CHRONIC PAIN;SURGICAL PAIN

## 2020-05-06 ASSESSMENT — PAIN DESCRIPTION - PROGRESSION
CLINICAL_PROGRESSION: GRADUALLY IMPROVING
CLINICAL_PROGRESSION: GRADUALLY WORSENING

## 2020-05-06 ASSESSMENT — PAIN DESCRIPTION - ORIENTATION
ORIENTATION: LEFT;RIGHT;LOWER
ORIENTATION: RIGHT;LEFT;MID

## 2020-05-06 ASSESSMENT — PAIN DESCRIPTION - DESCRIPTORS
DESCRIPTORS: ACHING;CONSTANT;DISCOMFORT
DESCRIPTORS: ACHING;CONSTANT;DISCOMFORT;RADIATING

## 2020-05-06 ASSESSMENT — PAIN DESCRIPTION - LOCATION
LOCATION: BUTTOCKS;HIP
LOCATION: BUTTOCKS;LEG

## 2020-05-06 ASSESSMENT — PAIN - FUNCTIONAL ASSESSMENT: PAIN_FUNCTIONAL_ASSESSMENT: PREVENTS OR INTERFERES WITH MANY ACTIVE NOT PASSIVE ACTIVITIES

## 2020-05-06 NOTE — PROGRESS NOTES
Normal respiratory effort. Clear to auscultation, bilaterally without Rales/Wheezes/Rhonchi. Cardiovascular: Regular rate and rhythm with normal S1/S2 without murmurs, rubs or gallops. Abdomen: Soft, non-tender, non-distended with normal bowel sounds. Musculoskeletal: No clubbing, cyanosis or edema bilaterally. Full range of motion without deformity. Skin: Dressing and decubitus area  Neurologic:  grossly non-focal.  Psychiatric: Alert and oriented, thought content appropriate, normal insight        Labs:   Recent Labs     05/04/20  2200 05/05/20  1230 05/06/20  0545   WBC 14.5* 11.0 11.8*   HGB 7.9* 8.0* 8.6*   HCT 25.1* 25.4* 27.9*    251 300     Recent Labs     05/04/20  0500 05/05/20  1230 05/06/20  0545    143 141   K 4.9 5.1* 5.1*    109* 106   CO2 25 21* 21*   BUN 70* 70* 71*   CREATININE 4.4* 4.4* 4.4*   CALCIUM 8.2* 7.6* 8.0*   PHOS  --  4.9*  --      Recent Labs     05/03/20  1453 05/05/20  1230 05/06/20  0545   AST 42* 23 22   ALT 22 17 17   BILITOT <0.2 <0.2 <0.2   ALKPHOS 140* 128* 137*     No results for input(s): INR in the last 72 hours. No results for input(s): Ether Zackery in the last 72 hours. Urinalysis:      Lab Results   Component Value Date    NITRU Negative 05/04/2020    WBCUA 10-20 05/04/2020    WBCUA 5-10 08/17/2011    BACTERIA MANY 05/04/2020    RBCUA 0-1 05/04/2020    RBCUA 0-1 06/26/2013    BLOODU TRACE-INTACT 05/04/2020    SPECGRAV 1.025 05/04/2020    GLUCOSEU Negative 05/04/2020    GLUCOSEU NEGATIVE 08/17/2011       Radiology:  XR CHEST PORTABLE   Final Result   No interval change aside from satisfactory placement of right-sided   central line               XR CHEST PORTABLE   Final Result      Cardiomegaly which appears to be stable      No evidence of airspace consolidation or pulmonary venous congestion.                  Assessment/Plan:    Active Hospital Problems    Diagnosis    Sepsis ([facility].) [A41.9]     #COVID-19 infection-no pneumonia on chest x-ray  -She has completed Plaquenil. Continue vitamin C and zinc.  -Continues to be afebrile    #Sepsis present on admission- due to COVID-19. Resolved. #Sacral pressure decubitus ulcer that is post bedside sharp excisional debridement on  5/3/2020 and repeat excisional debridement of skin, subcutaneous tissue, muscle and fascia of sacrum and gluteal region on 5/4/2020  -Appreciate surgery recommendations.  -Follow-up final surgical culture report. Growing rare gram-positive cocci in pairs and rare gram variable rods. Proteus mirabilis growing in initial culture from debridement. Continue Zosyn and vancomycin. Vancomycin levels supratherapeutic. Pharmacy is monitoring levels. -Recommendations for diverting colostomy from ID  -Continue pain control    #Advanced chronic kidney disease-creatinine stable.  -As per nephrology. Patient approaching ESRD. No plans for dialysis. #Acute metabolic and sepsis encephalopathy-?uremia or decreased clearance of medications. Resolved. -Continue reduced dose of gabapentin  -PRN Ativan and Percocet with caution    #Acute on chronic anemia-likely due to advanced CKD. -ARUN per nephrology. H&H stable. Received Epogen this admission. #Diabetes-continue insulin coverage    I discussed yesterday with patient's son. I informed her that patient will need 24-hour care on discharge due to sacral ulcer and decreased mobility. He agreed for patient to be sent to ECF. He would not like patient to return to [facility]. DVT Prophylaxis: Heparin subcu  Diet: DIET NO SALT ADDED (3-4 GM);   Dietary Nutrition Supplements: Low Calorie High Protein Supplement  Dietary Nutrition Supplements: Other Oral Supplement (see comment)  Code Status: Prior    PT/OT Eval Status: Ordered    Dispo -ECF when okay with consultants    Fany Miranda MD

## 2020-05-06 NOTE — PROGRESS NOTES
Pharmacy Consultation Note  (Antibiotic Dosing and Monitoring)    Initial consult date: 20  Consulting physician: Dr. Pippa Perez  Drug(s): Vancomycin   Indication: Sacral decubitus ulcer    Ht Readings from Last 1 Encounters:   20 5' 1\" (1.549 m)       Age/Gender IBW DW  Allergy Information   78 y.o.  female, 105.2 kg 47.3 kg 70.5 kg  Patient has no known allergies. Date  WBC BUN/sCr UOP (mL/kg/hr) Drug/Dose Time   Given Level(s)   (Time) Comments   5/3  (#1) 21.2 68/4.3 --- Vancomycin 2000 mg IV x 1 1619       (#2) 17.1 70/4.4 --- No dose ---       (#3) 11 70/4.4 --- Vancomycin 1500 mg IV x 1 1632 Level = 9.6 @ 1230 Level originally scheduled to be drawn at 0600     (#4) 11.8 71/4.4 --- No dose  Level = 30.9 mcg/mL @ 0545      (#5)            (#6)            Other Antibiotics/Antifungals/Antivirals Start Date Stop Date Comments   Zosyn 3.375 g IV q 12 hr                          Estimated CrCL: 13 mL/min      Intake/Output Summary (Last 24 hours) at 2020 1302  Last data filed at 2020 0758  Gross per 24 hour   Intake 490 ml   Output 950 ml   Net -460 ml       Temp max: Temp (24hrs), Av.4 °F (36.3 °C), Min:96.5 °F (35.8 °C), Max:98 °F (36.7 °C)      Cultures:    Culture Date Result    Wound (sacral ulcer)  Proteus species, nonhemolytic strep species, corynebacterium, staph aureus   Urine  Streptococcus species   61577 CFU/mL   Blood #1 5/3 No growth   Blood #2 5/3 No growth       Assessment:  · Consulted by Dr. Wilder Freeman to dose/monitor vancomycin  · Goal trough level:  15-20 mcg/mL  · Patient is a 79 yo/F with a PMH significant for DM, HTN, CKD, sarcoidosis, and recently tested positive for COVID-19 who presented to [facility] ([facility]) on 5/3 due to AMS. Admitted for sepsis evaluation, COVID-19, and sacral decubitus ulcer. Received vancomycin 2000 mg IV x 1 in ER. · sCr = 4.4 mg/dL (baseline varies from 3.3 - 5.6 mg/dL)  · : Random vancomycin level = 9.6 mcg/mL.   · : Random

## 2020-05-06 NOTE — PLAN OF CARE
Problem: Pain:  Goal: Pain level will decrease  Description: Pain level will decrease  Outcome: Met This Shift  Goal: Control of acute pain  Description: Control of acute pain  Outcome: Met This Shift  Goal: Control of chronic pain  Description: Control of chronic pain  Outcome: Met This Shift     Problem: Falls - Risk of:  Goal: Will remain free from falls  Description: Will remain free from falls  Outcome: Met This Shift  Goal: Absence of physical injury  Description: Absence of physical injury  Outcome: Met This Shift     Problem: Skin Integrity:  Goal: Absence of new skin breakdown  Description: Absence of new skin breakdown  Outcome: Met This Shift

## 2020-05-06 NOTE — CARE COORDINATION
5/6/2020 pt Covid (+) 5/3. Presently on 2L NC . BUN 71, Cr 4.4 today. Nephrology following. Hgb 8.6 today. General surgery following for sacral wound. Dressing changes daily. IV zosyn q12h, IV NS @ 100 cc/hr. Awaiting return phone call from son for YASIR choices from list emailed to home yesterday. SW/CM will follow.

## 2020-05-06 NOTE — ADT AUTH CERT
Sepsis and Other Febrile Illness, without Focal Infection - Care Day 4 (5/6/2020) by Mariah Ray RN         Review Status Review Entered   Completed 5/6/2020 13:06       Criteria Review      Care Day: 4 Care Date: 5/6/2020 Level of Care:    Guideline Day 3    Clinical Status    (X) * Mental status at baseline    5/6/2020 1:06 PM EDT by Gloria Romeo TO PERSON AND PLACE, DISORIENTED TO TIME AND SITUATION    ( ) * Fever absent or reduced    5/6/2020 1:06 PM EDT by Preet Atkinson      T 98    Routes    ( ) * Oral hydration    Medications    (X) Possible antimicrobial treatment    5/6/2020 1:06 PM EDT by Joni Sheriff IV    * Milestone   Additional Notes   5/6/2020   VITALS:  P 96; RR 20; /60      MEDICATIONS: NORVASC DAILY, ROCALTROL DAILY, PHOSLO 3 TIMES DAILY WITH  MEALS, RETACRIT ONCE PER DAY ON MON WED FRI, NEURONTIN 3 TIMES DAILY, HEPARIN SC Q8H, APRESOLINE 3 TIMES DAILY, HUMALOG SS Q4H, ZOSYN IV Q12H, SODIUM BICARBONATE 2 TIMES DAILY, DAKINS 2 TIMES DAILY, VITAMIN C DAILY, ZINC DAILY, PERCOCET 2 TABLET PRN X1      ** ** INTERNAL MEDICINE ** **    COVID-19 infection-no pneumonia on chest x-ray   -She has completed Plaquenil.  Continue vitamin C and zinc.   -Continues to be afebrile       #Sepsis present on admission- due to COVID-19.  Resolved.       #Sacral pressure decubitus ulcer that is post bedside sharp excisional debridement on  5/3/2020 and repeat excisional debridement of skin, subcutaneous tissue, muscle and fascia of sacrum and gluteal region on 5/4/2020   -Appreciate surgery recommendations.   -Follow-up final surgical culture report.  Growing rare gram-positive cocci in pairs and rare gram variable rods.  Proteus mirabilis growing in initial culture from debridement.  Continue Zosyn and vancomycin.  Vancomycin levels supratherapeutic.  Pharmacy is monitoring levels.    -Recommendations for diverting colostomy from ID   -Continue pain control       #Advanced chronic kidney 25.4 (L)   Neutrophils %: 91.4 (H)   Lymphocyte %: 3.4 (L)   Neutrophils Absolute: 10.01 (H)   Lymphocytes Absolute: 0.33 (L)      ** ** INFECTIOUS DISEASE ** **    Recommendations:   Continue piperacillin-tazobactam and vancomycin dosed according to trough per pharmacy. Consider diverting colostomy. Follow up blood, tissue and wound cultures. ** ** INTERNAL MEDICINE ** **        #COVID-19 infection-no pneumonia on chest x-ray   -Patient was started on Plaquenil, vitamin C and zinc.  She has completed Plaquenil.   -Trend markers   -Continues to be afebrile       #Sepsis present on admission- due to COVID-19   -Continue supportive care and IV hydration       #Sacral pressure decubitus ulcer that is post bedside sharp excisional debridement on  5/3/2020 and repeat excisional debridement of skin, subcutaneous tissue, muscle and fascia of sacrum and gluteal region on 5/4/2020   -Appreciate surgery recommendations.   -Follow-up final culture report.  Growing rare gram-positive cocci in pairs and rare gram variable rods.  Continue Zosyn and vancomycin.   -Recommendations for diverting colostomy from    -Continue pain control       #Advanced chronic kidney disease   -As per nephrology. Maritza Ren approaching ESRD.  No plans for dialysis.       #Acute metabolic and sepsis encephalopathy-?uremia or decreased clearance of medications   -Continue reduced dose of gabapentin   -We will give PRN Ativan and Percocet with caution       #Acute on chronic anemia-likely due to advanced CKD. -Follow-up iron stores. -ARUN per nephrology. Joanne Gottron Epogen this admission.       #Diabetes-continue insulin coverage       ** ** NEPHROLOGY * **        Assessment/Plan   1. CKD 4/5   Baseline 4.2, eGFR 10-12 ml/min in 2/2020, 3.1 in 8/2019   Serum cr 4.3->4.4    Follow on IVF   4/11/20 JOSUÉ showed no hydronephrosis   Daily bmp   No immediate plans for HD            2.  Sacral wound infection   S/p bedside sharp excisional debridement of sacral pressure ulcer on 05/03   Wound culture showing mixed gram-positive organisms, gram-negative rods, Proteus species. On piperacillin-tazobactam, vancomycin    ID following   S/P debridement 5/4       3. Sepsis   SARS-CoV-2 positive   On IVF, piperacillin-tazobactam, vancomycin    Blood, urine cultures pending   ID following       4. Anemia of CKD   Hgb 7.1 -> 6.1 - s/p 2 units pRBCs   Start ARUN    Iron sat, TIBC, folate low, no iron supplementation with infectionTransfuse for hgb below 7.       5. Secondary hyperparathyrodism of renal origin   Follow closely on vit d analogue due to sarcoid and h/o stones   Ca 8.2, check PO4   PTH 2/1/20 was 336, will re-check   On rocaltrol, phoslo       6. Hyperkalemia with CKD   On admission 5.3, today 5.1   Will follow       7. Metabolic acidosis with CKD, sepsis   CO2 19 on admission, 25 today       6.  HTN in CKD Stages I-IV   BP at target of <130/80   Follow on norvasc apresoline

## 2020-05-06 NOTE — PROGRESS NOTES
injection 10 mL, 10 mL, Intravenous, PRN, Chelsey Saldañafreddie, DO, 10 mL at 05/06/20 0951    insulin lispro (HUMALOG) injection vial 0-18 Units, 0-18 Units, Subcutaneous, Q4H, Liv Callejas DO, 3 Units at 05/06/20 0135    glucose (GLUTOSE) 40 % oral gel 15 g, 15 g, Oral, PRN, Liv Callejas,     dextrose 50 % IV solution, 12.5 g, Intravenous, PRN, Liv Callejas DO    glucagon (rDNA) injection 1 mg, 1 mg, Intramuscular, PRN, Liv Callejas,     dextrose 5 % solution, 100 mL/hr, Intravenous, PRN, Liv Callejas,     gabapentin (NEURONTIN) capsule 100 mg, 100 mg, Oral, TID, Liv Callejas DO, 100 mg at 05/06/20 1313    vancomycin (VANCOCIN) intermittent dosing (placeholder), , Other, RX Placeholder, Liv Callejas DO    epoetin derek-epbx (RETACRIT) injection 3,000 Units, 3,000 Units, Subcutaneous, Once per day on Mon Wed Fri, Cipriano MICHELLE Alvarez DO, 3,000 Units at 05/06/20 0950    oxyCODONE-acetaminophen (PERCOCET) 5-325 MG per tablet 1 tablet, 1 tablet, Oral, Q4H PRN **OR** oxyCODONE-acetaminophen (PERCOCET) 5-325 MG per tablet 2 tablet, 2 tablet, Oral, Q4H PRN, Liv Callejas DO, 2 tablet at 05/06/20 4522    ALPRAZolam (XANAX) tablet 0.25 mg, 0.25 mg, Oral, Nightly PRN, Liv Callejas DO    amLODIPine (NORVASC) tablet 10 mg, 10 mg, Oral, Daily, Liv Callejas DO, 10 mg at 05/06/20 7722    calcitRIOL (ROCALTROL) capsule 0.25 mcg, 0.25 mcg, Oral, Daily, Lvi Callejas DO, 0.25 mcg at 05/06/20 0950    calcium acetate (PHOSLO) capsule 1,334 mg, 1,334 mg, Oral, TID WC, Liv Callejas DO, 1,334 mg at 05/06/20 1312    hydrALAZINE (APRESOLINE) tablet 50 mg, 50 mg, Oral, TID, Liv Callejas DO, 50 mg at 05/06/20 1313    polyethylene glycol (GLYCOLAX) packet 17 g, 17 g, Oral, Daily PRN, Liv Callejas DO    sodium bicarbonate tablet 1,300 mg, 1,300 mg, Oral, BID, Liv Callejas DO,

## 2020-05-07 LAB
ALBUMIN SERPL-MCNC: 1.6 G/DL (ref 3.5–5.2)
ALP BLD-CCNC: 180 U/L (ref 35–104)
ALT SERPL-CCNC: 22 U/L (ref 0–32)
ANAEROBIC CULTURE: ABNORMAL
ANAEROBIC CULTURE: ABNORMAL
ANAEROBIC CULTURE: NORMAL
ANION GAP SERPL CALCULATED.3IONS-SCNC: 12 MMOL/L (ref 7–16)
ANISOCYTOSIS: ABNORMAL
AST SERPL-CCNC: 41 U/L (ref 0–31)
BASOPHILS ABSOLUTE: 0 E9/L (ref 0–0.2)
BASOPHILS RELATIVE PERCENT: 0.4 % (ref 0–2)
BILIRUB SERPL-MCNC: <0.2 MG/DL (ref 0–1.2)
BLOOD BANK DISPENSE STATUS: NORMAL
BLOOD BANK DISPENSE STATUS: NORMAL
BLOOD BANK PRODUCT CODE: NORMAL
BLOOD BANK PRODUCT CODE: NORMAL
BPU ID: NORMAL
BPU ID: NORMAL
BUN BLDV-MCNC: 74 MG/DL (ref 8–23)
CALCIUM SERPL-MCNC: 8.2 MG/DL (ref 8.6–10.2)
CHLORIDE BLD-SCNC: 110 MMOL/L (ref 98–107)
CO2: 20 MMOL/L (ref 22–29)
CREAT SERPL-MCNC: 4.4 MG/DL (ref 0.5–1)
CULTURE SURGICAL: ABNORMAL
CULTURE SURGICAL: ABNORMAL
DESCRIPTION BLOOD BANK: NORMAL
DESCRIPTION BLOOD BANK: NORMAL
EOSINOPHILS ABSOLUTE: 0.22 E9/L (ref 0.05–0.5)
EOSINOPHILS RELATIVE PERCENT: 1.7 % (ref 0–6)
GFR AFRICAN AMERICAN: 12
GFR NON-AFRICAN AMERICAN: 10 ML/MIN/1.73
GLUCOSE BLD-MCNC: 107 MG/DL (ref 74–99)
HCT VFR BLD CALC: 28.4 % (ref 34–48)
HEMOGLOBIN: 8.7 G/DL (ref 11.5–15.5)
LYMPHOCYTES ABSOLUTE: 0.92 E9/L (ref 1.5–4)
LYMPHOCYTES RELATIVE PERCENT: 7 % (ref 20–42)
MAGNESIUM: 2 MG/DL (ref 1.6–2.6)
MCH RBC QN AUTO: 29.3 PG (ref 26–35)
MCHC RBC AUTO-ENTMCNC: 30.6 % (ref 32–34.5)
MCV RBC AUTO: 95.6 FL (ref 80–99.9)
METAMYELOCYTES RELATIVE PERCENT: 0.9 % (ref 0–1)
METER GLUCOSE: 100 MG/DL (ref 74–99)
METER GLUCOSE: 101 MG/DL (ref 74–99)
METER GLUCOSE: 134 MG/DL (ref 74–99)
METER GLUCOSE: 142 MG/DL (ref 74–99)
METER GLUCOSE: 165 MG/DL (ref 74–99)
MONOCYTES ABSOLUTE: 0.53 E9/L (ref 0.1–0.95)
MONOCYTES RELATIVE PERCENT: 4.3 % (ref 2–12)
MYELOCYTE PERCENT: 2.6 % (ref 0–0)
NEUTROPHILS ABSOLUTE: 11.48 E9/L (ref 1.8–7.3)
NEUTROPHILS RELATIVE PERCENT: 83.5 % (ref 43–80)
ORGANISM: ABNORMAL
PDW BLD-RTO: 15.4 FL (ref 11.5–15)
PLATELET # BLD: 336 E9/L (ref 130–450)
PMV BLD AUTO: 10.2 FL (ref 7–12)
POLYCHROMASIA: ABNORMAL
POTASSIUM SERPL-SCNC: 5.4 MMOL/L (ref 3.5–5)
RBC # BLD: 2.97 E12/L (ref 3.5–5.5)
SODIUM BLD-SCNC: 142 MMOL/L (ref 132–146)
TOTAL PROTEIN: 5.8 G/DL (ref 6.4–8.3)
VANCOMYCIN RANDOM: 20.5 MCG/ML (ref 5–40)
WBC # BLD: 13.2 E9/L (ref 4.5–11.5)

## 2020-05-07 PROCEDURE — 6360000002 HC RX W HCPCS: Performed by: SURGERY

## 2020-05-07 PROCEDURE — 80202 ASSAY OF VANCOMYCIN: CPT

## 2020-05-07 PROCEDURE — 80053 COMPREHEN METABOLIC PANEL: CPT

## 2020-05-07 PROCEDURE — 36415 COLL VENOUS BLD VENIPUNCTURE: CPT

## 2020-05-07 PROCEDURE — 6360000002 HC RX W HCPCS: Performed by: STUDENT IN AN ORGANIZED HEALTH CARE EDUCATION/TRAINING PROGRAM

## 2020-05-07 PROCEDURE — 97530 THERAPEUTIC ACTIVITIES: CPT

## 2020-05-07 PROCEDURE — 6370000000 HC RX 637 (ALT 250 FOR IP): Performed by: STUDENT IN AN ORGANIZED HEALTH CARE EDUCATION/TRAINING PROGRAM

## 2020-05-07 PROCEDURE — 97166 OT EVAL MOD COMPLEX 45 MIN: CPT

## 2020-05-07 PROCEDURE — 85025 COMPLETE CBC W/AUTO DIFF WBC: CPT

## 2020-05-07 PROCEDURE — 6360000002 HC RX W HCPCS: Performed by: INTERNAL MEDICINE

## 2020-05-07 PROCEDURE — 6370000000 HC RX 637 (ALT 250 FOR IP): Performed by: INTERNAL MEDICINE

## 2020-05-07 PROCEDURE — 82962 GLUCOSE BLOOD TEST: CPT

## 2020-05-07 PROCEDURE — 2580000003 HC RX 258: Performed by: STUDENT IN AN ORGANIZED HEALTH CARE EDUCATION/TRAINING PROGRAM

## 2020-05-07 PROCEDURE — 97535 SELF CARE MNGMENT TRAINING: CPT

## 2020-05-07 PROCEDURE — 2140000000 HC CCU INTERMEDIATE R&B

## 2020-05-07 PROCEDURE — 83735 ASSAY OF MAGNESIUM: CPT

## 2020-05-07 PROCEDURE — 97162 PT EVAL MOD COMPLEX 30 MIN: CPT

## 2020-05-07 RX ADMIN — SODIUM BICARBONATE 1300 MG: 650 TABLET ORAL at 22:00

## 2020-05-07 RX ADMIN — PIPERACILLIN AND TAZOBACTAM 3.38 G: 3; .375 INJECTION, POWDER, FOR SOLUTION INTRAVENOUS at 22:01

## 2020-05-07 RX ADMIN — TRIMETHOBENZAMIDE HYDROCHLORIDE 300 MG: 300 CAPSULE ORAL at 13:09

## 2020-05-07 RX ADMIN — HYDROMORPHONE HYDROCHLORIDE 0.25 MG: 1 INJECTION, SOLUTION INTRAMUSCULAR; INTRAVENOUS; SUBCUTANEOUS at 13:10

## 2020-05-07 RX ADMIN — GABAPENTIN 100 MG: 100 CAPSULE ORAL at 22:02

## 2020-05-07 RX ADMIN — OXYCODONE HYDROCHLORIDE AND ACETAMINOPHEN 2 TABLET: 5; 325 TABLET ORAL at 10:43

## 2020-05-07 RX ADMIN — HYDRALAZINE HYDROCHLORIDE 50 MG: 25 TABLET, FILM COATED ORAL at 22:00

## 2020-05-07 RX ADMIN — GABAPENTIN 100 MG: 100 CAPSULE ORAL at 10:43

## 2020-05-07 RX ADMIN — OXYCODONE HYDROCHLORIDE AND ACETAMINOPHEN 2 TABLET: 5; 325 TABLET ORAL at 16:09

## 2020-05-07 RX ADMIN — HEPARIN SODIUM 5000 UNITS: 10000 INJECTION INTRAVENOUS; SUBCUTANEOUS at 05:44

## 2020-05-07 RX ADMIN — PIPERACILLIN AND TAZOBACTAM 3.38 G: 3; .375 INJECTION, POWDER, FOR SOLUTION INTRAVENOUS at 10:51

## 2020-05-07 RX ADMIN — CALCIUM ACETATE 1334 MG: 667 CAPSULE ORAL at 16:10

## 2020-05-07 RX ADMIN — INSULIN LISPRO 3 UNITS: 100 INJECTION, SOLUTION INTRAVENOUS; SUBCUTANEOUS at 10:54

## 2020-05-07 RX ADMIN — OXYCODONE HYDROCHLORIDE AND ACETAMINOPHEN 2 TABLET: 5; 325 TABLET ORAL at 23:12

## 2020-05-07 RX ADMIN — SODIUM CHLORIDE, PRESERVATIVE FREE 300 UNITS: 5 INJECTION INTRAVENOUS at 22:02

## 2020-05-07 RX ADMIN — HEPARIN SODIUM 5000 UNITS: 10000 INJECTION INTRAVENOUS; SUBCUTANEOUS at 22:00

## 2020-05-07 ASSESSMENT — PAIN SCALES - WONG BAKER: WONGBAKER_NUMERICALRESPONSE: 0

## 2020-05-07 ASSESSMENT — PAIN DESCRIPTION - ONSET: ONSET: ON-GOING

## 2020-05-07 ASSESSMENT — PAIN DESCRIPTION - PROGRESSION: CLINICAL_PROGRESSION: GRADUALLY WORSENING

## 2020-05-07 ASSESSMENT — PAIN SCALES - GENERAL
PAINLEVEL_OUTOF10: 0
PAINLEVEL_OUTOF10: 0
PAINLEVEL_OUTOF10: 8
PAINLEVEL_OUTOF10: 10
PAINLEVEL_OUTOF10: 8
PAINLEVEL_OUTOF10: 0
PAINLEVEL_OUTOF10: 8
PAINLEVEL_OUTOF10: 10

## 2020-05-07 ASSESSMENT — PAIN DESCRIPTION - LOCATION: LOCATION: BUTTOCKS

## 2020-05-07 ASSESSMENT — PAIN DESCRIPTION - DESCRIPTORS: DESCRIPTORS: ACHING;CONSTANT;DISCOMFORT

## 2020-05-07 ASSESSMENT — PAIN DESCRIPTION - FREQUENCY: FREQUENCY: CONTINUOUS

## 2020-05-07 ASSESSMENT — PAIN - FUNCTIONAL ASSESSMENT: PAIN_FUNCTIONAL_ASSESSMENT: PREVENTS OR INTERFERES WITH MANY ACTIVE NOT PASSIVE ACTIVITIES

## 2020-05-07 ASSESSMENT — PAIN DESCRIPTION - PAIN TYPE: TYPE: CHRONIC PAIN;SURGICAL PAIN

## 2020-05-07 ASSESSMENT — PAIN DESCRIPTION - ORIENTATION: ORIENTATION: MID;LOWER

## 2020-05-07 NOTE — PROGRESS NOTES
Pharmacy Consultation Note  (Antibiotic Dosing and Monitoring)    Initial consult date: 20  Consulting physician: Dr. Lisa Leigh  Drug(s): Vancomycin   Indication: Sacral decubitus ulcer    Ht Readings from Last 1 Encounters:   20 5' 1\" (1.549 m)       Age/Gender IBW DW  Allergy Information   78 y.o.  female, 105.2 kg 47.3 kg 70.5 kg  Patient has no known allergies. Date  WBC BUN/sCr UOP (mL/kg/hr) Drug/Dose Time   Given Level(s)   (Time) Comments   5/3  (#1) 21.2 68/4.3 --- Vancomycin 2000 mg IV x 1 1619       (#2) 17.1 70/4.4 --- No dose ---       (#3) 11 70/4.4 --- Vancomycin 1500 mg IV x 1 1632 Level = 9.6 @ 1230 Level originally scheduled to be drawn at 0600     (#4) 11.8 71/4.4 --- No dose --- Level = 30.9 mcg/mL @ 0545      (#5) 13.2 74/4.4 --- No dose --- Level = 20.5 mcg/mL @ 0445      (#6)      Random level @ 0600      Other Antibiotics/Antifungals/Antivirals Start Date Stop Date Comments   Zosyn 3.375 g IV q 12 hr                          Estimated CrCL: 13 mL/min      Intake/Output Summary (Last 24 hours) at 2020 1216  Last data filed at 2020 2232  Gross per 24 hour   Intake 1019 ml   Output 550 ml   Net 469 ml       Temp max: Temp (24hrs), Av.1 °F (36.7 °C), Min:97.8 °F (36.6 °C), Max:98.2 °F (36.8 °C)      Cultures:    Culture Date Result    Wound (sacral ulcer)  Proteus mirabilis, Staph aureus, Enterococcus avium   Urine  Streptococcus species   36576 CFU/mL   Blood #1 5/3 No growth   Blood #2 5/3 No growth   Anaerobic (sacral)  Anaerobic GNR       Assessment:  · Consulted by Dr. Jericho Aguilar to dose/monitor vancomycin  · Goal trough level:  15-20 mcg/mL  · Patient is a 79 yo/F with a PMH significant for DM, HTN, CKD, sarcoidosis, and recently tested positive for COVID-19 who [facility] to [facility] on 5/3 due to AMS. Admitted for sepsis evaluation, COVID-19, and sacral decubitus ulcer. Received vancomycin 2000 mg IV x 1 in ER.   · sCr = 4.4 mg/dL (baseline varies from 3.3 - 5.6 mg/dL)  · 5/5: Random vancomycin level = 9.6 mcg/mL  · 5/6: Random vancomycin level = 30.9 mcg/mL  · 5/7: Random vancomycin level = 20.5 mcg/mL    Plan:  · No vancomycin today. · Obtain vancomycin level tomorrow morning. · Due to poor renal function, will dose vancomycin based on levels. · Pharmacist will follow and monitor/adjust dosing as necessary. Thank you for this consult.     Tiffanie Vieira, PharmD 5/7/2020 12:16 PM

## 2020-05-07 NOTE — PROGRESS NOTES
infection  Transfuse for hgb below 7.    5. Secondary hyperparathyrodism of renal origin  Follow closely on vit d analogue due to sarcoid and h/o stones  Ca 8.2, PO4 4.9  PTH 2/1/20 was 336, 5/5   On rocaltrol, phoslo     6. Hyperkalemia with CKD  On admission 5.3, today 5.4. Will follow     7. Metabolic acidosis with CKD, sepsis  CO2 19 on admission, 20 today just below target of >22    6. HTN in CKD Stages I-IV  BP at target of <130/80  Follow on norvasc, apresoline     Thank you for the opportunity to participate in the care of [facility]. PABLO Jimenez - CNP       Patient seen and examined all key components of the physical performed independently , case discussed with NP, all pertinent labs and radiologic tests personally reviewed agree with above.   -Apparently nicole was kinked ; now unclamped and UO is better; almost 400cc this shift  -Cr essentially unchanged overallot past year; continue to monitor; likely pito nenita HD however no urgent indication  -Mild  Hyperkalemic, Veltassa starteed    Alida Tyler MD        .

## 2020-05-07 NOTE — PROGRESS NOTES
Dependent   Supine to sit: Dependent x2  Sit to supine: Dependent x2  Rolling: Moderate Assist   Supine to sit: Maximal Assist   Sit to supine: Maximal Assist    Functional Transfers NT   Maximal Assist    Functional Mobility NT       Balance Sitting:     Static:  Mod A   (episodes of min A with cuing)    Dynamic: Max A  (R lateral / posterior lean)    8+ minutes addressing posture, activity tolerance and safety     Standing: NT     Activity Tolerance P    Light activity; max encouragement provided   F   Visual/  Perceptual Glasses: no  wfl                  Vitals:  RA  Rest: O2 sat 92%,  bpm  Activity (EOB; grooming, ther ex): O2 sats 91-93%, -118 bpm  Activity (sit to supine, rolling, repositioning):  O2 sats 91-92%, -117 bpm  Rest at end of session:  O2 sats 92%,  bpm      Hand dominance: right     Strength ROM Additional Info:    RUE   Proximal 3/5  Distal 3+/5 Grossly wfl fair  and fair- FMC/dexterity noted during ADL tasks  + tremors   LUE  Proximal 3/5  Distal 3+/5 Grossly wfl Fair-  and fair- FMC/dexterity noted during ADL tasks  + tremors     Hearing: wfl  Sensation: Pt denies numbness and tingling  Tone: wfl  Edema:none noted                             Comments: Upon arrival, patient supine in bed and agreeable to OT Session with PT collaboration - nursing clearance obtained prior to session. Pt demonstrating poor understanding of education/techniques, requiring additional training / education. At end of session, patient semi-supine in bed with TAPS position (pt on right side as chart indicated), call light and phone within reach, all lines and tubes intact. Pt would benefit from continued skilled OT to increase safety,  independence and quality of life.     Treatment:  OT services provided: Facilitation of bed mobility (rolling x3; supine<>sit) and sitting balance at EOB (8+ minutes addressing posture, weight shifting and activity tolerance;  Varying levels of assist from ADLs [x]  Splinting Needs []   Positioning to improve overall function [x]   Therapeutic Activity [x]  Therapeutic Exercise  [x]  Visual/Perceptual: []    Delirium prevention/treatment  []   Other:  []    Rehab Potential: Fair for established goals    LTG: maximize independence with ADLs     Patient / Family Goal:  Not stated     Patient and/or family were instructed diagnosis, prognosis/goals and plan of care. Demonstrated poor understanding. [] Malnutrition indicators have been identified and nursing has been notified to ensure a dietitian consult is ordered.        AM-PAC Daily Activity Inpatient   How much help for putting on and taking off regular lower body clothing?: Total  How much help for Bathing?: A Lot  How much help for Toileting?: Total  How much help for putting on and taking off regular upper body clothing?: A Lot  How much help for taking care of personal grooming?: A Lot  How much help for eating meals?: A Lot  AM-[facility] Inpatient Daily Activity Raw Score: 10  AM-PAC Inpatient ADL T-Scale Score : 27.31  ADL Inpatient CMS 0-100% Score: 74.7  ADL Inpatient CMS G-Code Modifier : CL       mod Evaluation +     Treatment Time In:745            Treatment Time Out: 810              Treatment Charges: Mins Units   Ther Ex  46253     Manual Therapy 23300     Thera Activities 84553 13 1   ADL/Home Mgt [id number] 69 1   Neuro Re-ed 72409     Group Therapy      Orthotic manage/training  86600     Non-Billable Time     Total Timed Treatment 25 2             Quentin High OTR/L #[id number]

## 2020-05-07 NOTE — PROGRESS NOTES
wheezes  Cardiovascular: Regular rate and rhythm, no murmurs  Gastrointestinal: Bowel sounds present, soft, nontender  Skin: Warm and dry, no active dermatoses  Musculoskeletal: No joint swelling, no joint erythema      Labs, imaging, and medical records/notes were personally reviewed. Assessment:  Sepsis, secondary to infected sacral pressure ulcer s/p bedside debridement on 05/03  SARS-CoV-2 infection    Recommendations:  Continue piperacillin-tazobactam and vancomycin dosed according to trough per pharmacy. Anticipate 6 weeks of IV antibiotic therapy. Recommend diverting colostomy. Defer decision to General Surgery. Follow up blood, tissue and wound cultures. Remove right IJ TLC. Insert PICC. Risks,benefits, alternative options and potential complications associated with the procedure(s) to be performed were explained to the patient. Thank you for involving me in the care of Best Josef. I will continue to follow. Please do not hesitate to call for any questions or concerns.     Electronically signed by Allegra Lawrence MD on 5/7/2020 at 10:47 AM

## 2020-05-07 NOTE — PROGRESS NOTES
Physical Therapy  Physical Therapy Initial Assessment     Name: Carol Munoz  : 1950  MRN: [id number]    Referring Provider: Soheila Rossi MD    Date of Service: 2020    Evaluating PT: Danie Reyna, PT, DPT, [id number]    Room #: 5366/8646-B  Diagnosis: Sepsis  PMHx/PSHx: OA, DM, HTN, kidney disease, depression, tremor, sarcoidosis, obesity  Surgeries/Procedures: Excisional debridement of skin, subcutaneous tissue, muscle and fascia of sacrum and gluteal region ()  Precautions: Fall risk, Droplet Plus Isolation (COVID-19 Positive), sacral wound, continuous pulse ox, TAPS, nicole, alarm    SUBJECTIVE:    Pt is poor historian and unable to provide social hx/PLOF. Per chart, pt is from nursing home. Pt reports she performed stand pivot transfers to [facility]. Pt reports she was ambulating with [address] prior to nursing home. OBJECTIVE:   Initial Evaluation  Date: 20 Treatment Date: Short Term/ Long Term   Goals   AM-PAC 6 Clicks      Was pt agreeable to Eval/treatment? Yes     Does pt have pain? Severe generalized pain with movement; pt was repositioned for comfort at conclusion of session     Bed Mobility  Rolling: Dependent  Supine to sit: Dependent x2  Sit to supine: Dependent x2  Scooting: Dependent x2 to [facility]  Rolling: Mod A  Supine to sit: Mod A  Sit to supine: Mod A  Scooting: Mod A   Transfers Sit to stand: NT  Stand to sit: NT  Stand pivot: NT  Sit to stand: Max A  Stand to sit: Max A  Stand pivot: Max A with [address]   Ambulation   NT  Sidestep 3 feet with [address] with Max A   Stair negotiation: ascended and descended NA  NA   ROM BUE: Refer to OT note  BLE: NT     Strength BUE: Refer to OT note  BLE: NT     Balance Sitting EOB: Mod A with brief periods of Min A  Dynamic Standing: NT  Sitting EOB: SBA  Dynamic Standing: Max A with [address]     Pt is A & O x: 1 to person. Pt is confused with decreased attention span. Sensation: Pt denies numbness and tingling of extremities. Edema: Unremarkable.     Patient

## 2020-05-07 NOTE — PROGRESS NOTES
43.84 kg/m²     General appearance: No apparent distress, appears stated age and cooperative. Obese female  HEENT: Pupils equal, round, and reactive to light. Conjunctivae/corneas clear. Neck: Right IJ central line placed  Respiratory:  Normal respiratory effort. Clear to auscultation, bilaterally without Rales/Wheezes/Rhonchi. Cardiovascular: Regular rate and rhythm with normal S1/S2 without murmurs, rubs or gallops. Abdomen: Soft, non-tender, non-distended with normal bowel sounds. Musculoskeletal: No clubbing, cyanosis or edema bilaterally. Full range of motion without deformity. Skin: Dressing and decubitus area  Neurologic:  grossly non-focal.  Psychiatric: Alert and oriented, thought content appropriate, normal insight        Labs:   Recent Labs     05/05/20 1230 05/06/20  0545 05/07/20  0445   WBC 11.0 11.8* 13.2*   HGB 8.0* 8.6* 8.7*   HCT 25.4* 27.9* 28.4*    300 336     Recent Labs     05/05/20  1230 05/06/20  0545 05/07/20  0445    141 142   K 5.1* 5.1* 5.4*   * 106 110*   CO2 21* 21* 20*   BUN 70* 71* 74*   CREATININE 4.4* 4.4* 4.4*   CALCIUM 7.6* 8.0* 8.2*   PHOS 4.9*  --   --      Recent Labs     05/05/20 1230 05/06/20  0545 05/07/20  0445   AST 23 22 41*   ALT 17 17 22   BILITOT <0.2 <0.2 <0.2   ALKPHOS 128* 137* 180*     No results for input(s): INR in the last 72 hours. No results for input(s): Ritesh Saran in the last 72 hours.     Urinalysis:      Lab Results   Component Value Date    NITRU Negative 05/04/2020    WBCUA 10-20 05/04/2020    WBCUA 5-10 08/17/2011    BACTERIA MANY 05/04/2020    RBCUA 0-1 05/04/2020    RBCUA 0-1 06/26/2013    BLOODU TRACE-INTACT 05/04/2020    SPECGRAV 1.025 05/04/2020    GLUCOSEU Negative 05/04/2020    GLUCOSEU NEGATIVE 08/17/2011       Radiology:  XR CHEST PORTABLE   Final Result   No interval change aside from satisfactory placement of right-sided   central line               XR CHEST PORTABLE   Final Result      Cardiomegaly which appears to be stable      No evidence of airspace consolidation or pulmonary venous congestion. Assessment/Plan:    Active Hospital Problems    Diagnosis    Sepsis ([facility] [address] [A41.9]     #COVID-19 infection-no pneumonia on chest x-ray  -She has completed Plaquenil. Continue vitamin C and zinc.  -Continues to be afebrile  -Procalcitonin likely elevated due to infected decubitus ulcer    #Sepsis present on admission- due to COVID-19. Resolved. #Infected sacral pressure decubitus ulcer that is post bedside sharp excisional debridement on  5/3/2020 and repeat excisional debridement of skin, subcutaneous tissue, muscle and fascia of sacrum and gluteal region on 5/4/2020  Enterococcus avium and Proteus mirabilis on cultures  -Appreciate surgery recommendations. Recommendations for outpatient referral to follow up with [facility] in 1-2 weeks after discharge. Wound care recommendations noted. - Continue Zosyn and vancomycin. Vancomycin levels supratherapeutic. Pharmacy is monitoring levels. -Recommendations for diverting colostomy from ID  -Continue pain control    #Advanced chronic kidney disease-creatinine stable. UOP was not decreased but rather nicole had been kinked after pt was proned.  -As per nephrology. Patient approaching ESRD. No plans for dialysis. Reduced IVF rate. #Elevated potassium-? Veltassa needed. I added low potassium diet restriction. #Acute metabolic and sepsis encephalopathy-?uremia or decreased clearance of medications. Resolved. -Continue reduced dose of gabapentin  -PRN Ativan and Percocet with caution    #Acute on chronic anemia-likely due to advanced CKD. -ARUN per nephrology. H&H stable. Received Epogen this admission. #Diabetes-continue insulin coverage    #Nausea/vomitting-?uremia. Her abdomen is soft and has bowel sounds. Will give tigan due to QT prolongation.     #QT prolongation-avoiding medications that prolong interval.    DVT

## 2020-05-07 NOTE — PLAN OF CARE
Problem: Pain:  Goal: Pain level will decrease  Description: Pain level will decrease  Outcome: Met This Shift  Goal: Control of acute pain  Description: Control of acute pain  Outcome: Met This Shift  Goal: Control of chronic pain  Description: Control of chronic pain  Outcome: Met This Shift     Problem: Falls - Risk of:  Goal: Will remain free from falls  Description: Will remain free from falls  Outcome: Met This Shift  Goal: Absence of physical injury  Description: Absence of physical injury  Outcome: Met This Shift     Problem: Skin Integrity:  Goal: Will show no infection signs and symptoms  Description: Will show no infection signs and symptoms  Outcome: Met This Shift  Goal: Absence of new skin breakdown  Description: Absence of new skin breakdown  Outcome: Met This Shift

## 2020-05-08 ENCOUNTER — ANESTHESIA EVENT (OUTPATIENT)
Dept: OPERATING ROOM | Age: 70
DRG: 853 | End: 2020-05-08
Payer: MEDICARE

## 2020-05-08 ENCOUNTER — APPOINTMENT (OUTPATIENT)
Dept: GENERAL RADIOLOGY | Age: 70
DRG: 853 | End: 2020-05-08
Payer: MEDICARE

## 2020-05-08 ENCOUNTER — ANESTHESIA (OUTPATIENT)
Dept: OPERATING ROOM | Age: 70
DRG: 853 | End: 2020-05-08
Payer: MEDICARE

## 2020-05-08 VITALS — SYSTOLIC BLOOD PRESSURE: 121 MMHG | OXYGEN SATURATION: 98 % | DIASTOLIC BLOOD PRESSURE: 67 MMHG

## 2020-05-08 LAB
ABO/RH: NORMAL
ALBUMIN SERPL-MCNC: 1.5 G/DL (ref 3.5–5.2)
ALP BLD-CCNC: 128 U/L (ref 35–104)
ALT SERPL-CCNC: 27 U/L (ref 0–32)
ANGLE (CLOT STRENGTH): 78.6 DEGREE (ref 59–74)
ANION GAP SERPL CALCULATED.3IONS-SCNC: 14 MMOL/L (ref 7–16)
ANTIBODY SCREEN: NORMAL
AST SERPL-CCNC: 46 U/L (ref 0–31)
BASOPHILS ABSOLUTE: 0 E9/L (ref 0–0.2)
BASOPHILS RELATIVE PERCENT: 0.1 % (ref 0–2)
BILIRUB SERPL-MCNC: <0.2 MG/DL (ref 0–1.2)
BLOOD CULTURE, ROUTINE: NORMAL
BUN BLDV-MCNC: 77 MG/DL (ref 8–23)
CALCIUM SERPL-MCNC: 7.7 MG/DL (ref 8.6–10.2)
CHLORIDE BLD-SCNC: 112 MMOL/L (ref 98–107)
CO2: 17 MMOL/L (ref 22–29)
CREAT SERPL-MCNC: 4.3 MG/DL (ref 0.5–1)
CULTURE SURGICAL: ABNORMAL
CULTURE SURGICAL: ABNORMAL
CULTURE, BLOOD 2: NORMAL
EOSINOPHILS ABSOLUTE: 0.26 E9/L (ref 0.05–0.5)
EOSINOPHILS RELATIVE PERCENT: 1.7 % (ref 0–6)
EPL-TEG: 0 % (ref 0–15)
FERRITIN: 805 NG/ML
G-TEG: 18.3 K D/SC (ref 4.5–11)
GFR AFRICAN AMERICAN: 12
GFR NON-AFRICAN AMERICAN: 10 ML/MIN/1.73
GLUCOSE BLD-MCNC: 200 MG/DL (ref 74–99)
HCT VFR BLD CALC: 18.9 % (ref 34–48)
HCT VFR BLD CALC: 24.5 % (ref 34–48)
HEMOGLOBIN: 5.7 G/DL (ref 11.5–15.5)
HEMOGLOBIN: 7.7 G/DL (ref 11.5–15.5)
HYPOCHROMIA: ABNORMAL
K (CLOTTING TIME): 0.8 MIN (ref 1–3)
LACTATE DEHYDROGENASE: 304 U/L (ref 135–214)
LACTIC ACID: 0.9 MMOL/L (ref 0.5–2.2)
LY30 (FIBRINOLYSIS): 0 % (ref 0–8)
LYMPHOCYTES ABSOLUTE: 0.3 E9/L (ref 1.5–4)
LYMPHOCYTES RELATIVE PERCENT: 1.7 % (ref 20–42)
MA (MAX AMPLITUDE): 78.5 MM (ref 50–70)
MAGNESIUM: 1.9 MG/DL (ref 1.6–2.6)
MCH RBC QN AUTO: 29.5 PG (ref 26–35)
MCH RBC QN AUTO: 29.8 PG (ref 26–35)
MCHC RBC AUTO-ENTMCNC: 30.2 % (ref 32–34.5)
MCHC RBC AUTO-ENTMCNC: 31.4 % (ref 32–34.5)
MCV RBC AUTO: 95 FL (ref 80–99.9)
MCV RBC AUTO: 97.9 FL (ref 80–99.9)
METER GLUCOSE: 144 MG/DL (ref 74–99)
METER GLUCOSE: 222 MG/DL (ref 74–99)
MONOCYTES ABSOLUTE: 0.3 E9/L (ref 0.1–0.95)
MONOCYTES RELATIVE PERCENT: 1.7 % (ref 2–12)
MYELOCYTE PERCENT: 0.9 % (ref 0–0)
NEUTROPHILS ABSOLUTE: 14.44 E9/L (ref 1.8–7.3)
NEUTROPHILS RELATIVE PERCENT: 93.9 % (ref 43–80)
ORGANISM: ABNORMAL
PDW BLD-RTO: 14.8 FL (ref 11.5–15)
PDW BLD-RTO: 15.3 FL (ref 11.5–15)
PLATELET # BLD: 189 E9/L (ref 130–450)
PLATELET # BLD: 204 E9/L (ref 130–450)
PMV BLD AUTO: 9.5 FL (ref 7–12)
PMV BLD AUTO: 9.9 FL (ref 7–12)
POTASSIUM SERPL-SCNC: 5.7 MMOL/L (ref 3.5–5)
PROCALCITONIN: 5.36 NG/ML (ref 0–0.08)
R (REACTION TIME): 5.2 MIN (ref 5–10)
RBC # BLD: 1.93 E12/L (ref 3.5–5.5)
RBC # BLD: 2.58 E12/L (ref 3.5–5.5)
SARS-COV-2, NAAT: DETECTED
SODIUM BLD-SCNC: 143 MMOL/L (ref 132–146)
TOTAL PROTEIN: 4.1 G/DL (ref 6.4–8.3)
VANCOMYCIN RANDOM: 17.6 MCG/ML (ref 5–40)
WBC # BLD: 15.2 E9/L (ref 4.5–11.5)
WBC # BLD: 19.5 E9/L (ref 4.5–11.5)
WOUND/ABSCESS: ABNORMAL
WOUND/ABSCESS: ABNORMAL

## 2020-05-08 PROCEDURE — 85025 COMPLETE CBC W/AUTO DIFF WBC: CPT

## 2020-05-08 PROCEDURE — 3700000001 HC ADD 15 MINUTES (ANESTHESIA): Performed by: SURGERY

## 2020-05-08 PROCEDURE — C1751 CATH, INF, PER/CENT/MIDLINE: HCPCS

## 2020-05-08 PROCEDURE — 6360000002 HC RX W HCPCS: Performed by: NURSE ANESTHETIST, CERTIFIED REGISTERED

## 2020-05-08 PROCEDURE — 3700000000 HC ANESTHESIA ATTENDED CARE: Performed by: SURGERY

## 2020-05-08 PROCEDURE — C9113 INJ PANTOPRAZOLE SODIUM, VIA: HCPCS | Performed by: STUDENT IN AN ORGANIZED HEALTH CARE EDUCATION/TRAINING PROGRAM

## 2020-05-08 PROCEDURE — 6370000000 HC RX 637 (ALT 250 FOR IP): Performed by: STUDENT IN AN ORGANIZED HEALTH CARE EDUCATION/TRAINING PROGRAM

## 2020-05-08 PROCEDURE — 36415 COLL VENOUS BLD VENIPUNCTURE: CPT

## 2020-05-08 PROCEDURE — U0002 COVID-19 LAB TEST NON-CDC: HCPCS

## 2020-05-08 PROCEDURE — 0DJ08ZZ INSPECTION OF UPPER INTESTINAL TRACT, VIA NATURAL OR ARTIFICIAL OPENING ENDOSCOPIC: ICD-10-PCS | Performed by: STUDENT IN AN ORGANIZED HEALTH CARE EDUCATION/TRAINING PROGRAM

## 2020-05-08 PROCEDURE — 71045 X-RAY EXAM CHEST 1 VIEW: CPT

## 2020-05-08 PROCEDURE — 86923 COMPATIBILITY TEST ELECTRIC: CPT

## 2020-05-08 PROCEDURE — 85027 COMPLETE CBC AUTOMATED: CPT

## 2020-05-08 PROCEDURE — 85576 BLOOD PLATELET AGGREGATION: CPT

## 2020-05-08 PROCEDURE — 6360000002 HC RX W HCPCS: Performed by: SURGERY

## 2020-05-08 PROCEDURE — 6370000000 HC RX 637 (ALT 250 FOR IP): Performed by: SURGERY

## 2020-05-08 PROCEDURE — 84145 PROCALCITONIN (PCT): CPT

## 2020-05-08 PROCEDURE — 85384 FIBRINOGEN ACTIVITY: CPT

## 2020-05-08 PROCEDURE — 86901 BLOOD TYPING SEROLOGIC RH(D): CPT

## 2020-05-08 PROCEDURE — 82962 GLUCOSE BLOOD TEST: CPT

## 2020-05-08 PROCEDURE — 5A1935Z RESPIRATORY VENTILATION, LESS THAN 24 CONSECUTIVE HOURS: ICD-10-PCS | Performed by: INTERNAL MEDICINE

## 2020-05-08 PROCEDURE — P9016 RBC LEUKOCYTES REDUCED: HCPCS

## 2020-05-08 PROCEDURE — C9113 INJ PANTOPRAZOLE SODIUM, VIA: HCPCS | Performed by: SURGERY

## 2020-05-08 PROCEDURE — 36569 INSJ PICC 5 YR+ W/O IMAGING: CPT

## 2020-05-08 PROCEDURE — 6360000002 HC RX W HCPCS: Performed by: INTERNAL MEDICINE

## 2020-05-08 PROCEDURE — 2580000003 HC RX 258: Performed by: STUDENT IN AN ORGANIZED HEALTH CARE EDUCATION/TRAINING PROGRAM

## 2020-05-08 PROCEDURE — 6360000002 HC RX W HCPCS: Performed by: STUDENT IN AN ORGANIZED HEALTH CARE EDUCATION/TRAINING PROGRAM

## 2020-05-08 PROCEDURE — 2709999900 HC NON-CHARGEABLE SUPPLY: Performed by: SURGERY

## 2020-05-08 PROCEDURE — 2000000000 HC ICU R&B

## 2020-05-08 PROCEDURE — 36592 COLLECT BLOOD FROM PICC: CPT

## 2020-05-08 PROCEDURE — 3600007511: Performed by: SURGERY

## 2020-05-08 PROCEDURE — 86900 BLOOD TYPING SEROLOGIC ABO: CPT

## 2020-05-08 PROCEDURE — 82728 ASSAY OF FERRITIN: CPT

## 2020-05-08 PROCEDURE — 83615 LACTATE (LD) (LDH) ENZYME: CPT

## 2020-05-08 PROCEDURE — 86850 RBC ANTIBODY SCREEN: CPT

## 2020-05-08 PROCEDURE — 80202 ASSAY OF VANCOMYCIN: CPT

## 2020-05-08 PROCEDURE — 36430 TRANSFUSION BLD/BLD COMPNT: CPT

## 2020-05-08 PROCEDURE — 83605 ASSAY OF LACTIC ACID: CPT

## 2020-05-08 PROCEDURE — 83735 ASSAY OF MAGNESIUM: CPT

## 2020-05-08 PROCEDURE — 3600007501: Performed by: SURGERY

## 2020-05-08 PROCEDURE — 85347 COAGULATION TIME ACTIVATED: CPT

## 2020-05-08 PROCEDURE — 0BH17EZ INSERTION OF ENDOTRACHEAL AIRWAY INTO TRACHEA, VIA NATURAL OR ARTIFICIAL OPENING: ICD-10-PCS | Performed by: INTERNAL MEDICINE

## 2020-05-08 PROCEDURE — 2580000003 HC RX 258: Performed by: NURSE ANESTHETIST, CERTIFIED REGISTERED

## 2020-05-08 PROCEDURE — 80053 COMPREHEN METABOLIC PANEL: CPT

## 2020-05-08 PROCEDURE — 76937 US GUIDE VASCULAR ACCESS: CPT

## 2020-05-08 RX ORDER — FLUCONAZOLE 100 MG/1
400 TABLET ORAL ONCE
Status: COMPLETED | OUTPATIENT
Start: 2020-05-08 | End: 2020-05-12

## 2020-05-08 RX ORDER — 0.9 % SODIUM CHLORIDE 0.9 %
20 INTRAVENOUS SOLUTION INTRAVENOUS ONCE
Status: DISCONTINUED | OUTPATIENT
Start: 2020-05-08 | End: 2020-05-13

## 2020-05-08 RX ORDER — SUCCINYLCHOLINE CHLORIDE 20 MG/ML
INJECTION INTRAMUSCULAR; INTRAVENOUS PRN
Status: DISCONTINUED | OUTPATIENT
Start: 2020-05-08 | End: 2020-05-08 | Stop reason: SDUPTHER

## 2020-05-08 RX ORDER — EPINEPHRINE 1 MG/ML
INJECTION, SOLUTION, CONCENTRATE INTRAVENOUS PRN
Status: DISCONTINUED | OUTPATIENT
Start: 2020-05-08 | End: 2020-05-08 | Stop reason: ALTCHOICE

## 2020-05-08 RX ORDER — LIDOCAINE HYDROCHLORIDE 20 MG/ML
INJECTION, SOLUTION INTRAVENOUS PRN
Status: DISCONTINUED | OUTPATIENT
Start: 2020-05-08 | End: 2020-05-08 | Stop reason: SDUPTHER

## 2020-05-08 RX ORDER — SODIUM CHLORIDE 9 MG/ML
10 INJECTION INTRAVENOUS ONCE
Status: DISCONTINUED | OUTPATIENT
Start: 2020-05-08 | End: 2020-05-09

## 2020-05-08 RX ORDER — SUCRALFATE 1 G/1
1 TABLET ORAL EVERY 6 HOURS SCHEDULED
Status: DISCONTINUED | OUTPATIENT
Start: 2020-05-08 | End: 2020-05-13 | Stop reason: HOSPADM

## 2020-05-08 RX ORDER — PANTOPRAZOLE SODIUM 40 MG/10ML
40 INJECTION, POWDER, LYOPHILIZED, FOR SOLUTION INTRAVENOUS 2 TIMES DAILY
Status: DISCONTINUED | OUTPATIENT
Start: 2020-05-08 | End: 2020-05-08

## 2020-05-08 RX ORDER — PANTOPRAZOLE SODIUM 40 MG/10ML
40 INJECTION, POWDER, LYOPHILIZED, FOR SOLUTION INTRAVENOUS ONCE
Status: DISCONTINUED | OUTPATIENT
Start: 2020-05-08 | End: 2020-05-09

## 2020-05-08 RX ORDER — ONDANSETRON 2 MG/ML
4 INJECTION INTRAMUSCULAR; INTRAVENOUS ONCE
Status: COMPLETED | OUTPATIENT
Start: 2020-05-08 | End: 2020-05-08

## 2020-05-08 RX ORDER — SODIUM CHLORIDE 9 MG/ML
INJECTION, SOLUTION INTRAVENOUS CONTINUOUS PRN
Status: DISCONTINUED | OUTPATIENT
Start: 2020-05-08 | End: 2020-05-08 | Stop reason: SDUPTHER

## 2020-05-08 RX ORDER — FLUCONAZOLE 100 MG/1
200 TABLET ORAL DAILY
Status: DISCONTINUED | OUTPATIENT
Start: 2020-05-09 | End: 2020-05-13 | Stop reason: HOSPADM

## 2020-05-08 RX ORDER — PROPOFOL 10 MG/ML
INJECTION, EMULSION INTRAVENOUS PRN
Status: DISCONTINUED | OUTPATIENT
Start: 2020-05-08 | End: 2020-05-08 | Stop reason: SDUPTHER

## 2020-05-08 RX ORDER — SODIUM CHLORIDE 9 MG/ML
10 INJECTION INTRAVENOUS 2 TIMES DAILY
Status: DISCONTINUED | OUTPATIENT
Start: 2020-05-08 | End: 2020-05-08

## 2020-05-08 RX ADMIN — HYDRALAZINE HYDROCHLORIDE 50 MG: 25 TABLET, FILM COATED ORAL at 09:22

## 2020-05-08 RX ADMIN — AMLODIPINE BESYLATE 10 MG: 10 TABLET ORAL at 09:22

## 2020-05-08 RX ADMIN — PIPERACILLIN AND TAZOBACTAM 3.38 G: 3; .375 INJECTION, POWDER, FOR SOLUTION INTRAVENOUS at 09:48

## 2020-05-08 RX ADMIN — OXYCODONE HYDROCHLORIDE AND ACETAMINOPHEN 2 TABLET: 5; 325 TABLET ORAL at 05:29

## 2020-05-08 RX ADMIN — LIDOCAINE HYDROCHLORIDE 60 MG: 20 INJECTION, SOLUTION INTRAVENOUS at 19:35

## 2020-05-08 RX ADMIN — HYDRALAZINE HYDROCHLORIDE 50 MG: 25 TABLET, FILM COATED ORAL at 23:55

## 2020-05-08 RX ADMIN — INSULIN LISPRO 6 UNITS: 100 INJECTION, SOLUTION INTRAVENOUS; SUBCUTANEOUS at 14:46

## 2020-05-08 RX ADMIN — HYDROMORPHONE HYDROCHLORIDE 0.25 MG: 1 INJECTION, SOLUTION INTRAMUSCULAR; INTRAVENOUS; SUBCUTANEOUS at 17:07

## 2020-05-08 RX ADMIN — SODIUM CHLORIDE, PRESERVATIVE FREE 10 ML: 5 INJECTION INTRAVENOUS at 09:22

## 2020-05-08 RX ADMIN — SODIUM BICARBONATE 1300 MG: 650 TABLET ORAL at 09:22

## 2020-05-08 RX ADMIN — GABAPENTIN 100 MG: 100 CAPSULE ORAL at 23:55

## 2020-05-08 RX ADMIN — PHENYLEPHRINE HYDROCHLORIDE 200 MCG: 10 INJECTION INTRAVENOUS at 19:42

## 2020-05-08 RX ADMIN — GABAPENTIN 100 MG: 100 CAPSULE ORAL at 09:23

## 2020-05-08 RX ADMIN — SODIUM CHLORIDE: 9 INJECTION, SOLUTION INTRAVENOUS at 19:29

## 2020-05-08 RX ADMIN — SODIUM BICARBONATE 1300 MG: 650 TABLET ORAL at 23:56

## 2020-05-08 RX ADMIN — SODIUM CHLORIDE, PRESERVATIVE FREE 300 UNITS: 5 INJECTION INTRAVENOUS at 23:56

## 2020-05-08 RX ADMIN — HYDROMORPHONE HYDROCHLORIDE 0.25 MG: 1 INJECTION, SOLUTION INTRAMUSCULAR; INTRAVENOUS; SUBCUTANEOUS at 13:27

## 2020-05-08 RX ADMIN — Medication 50 MG: at 09:22

## 2020-05-08 RX ADMIN — PHENYLEPHRINE HYDROCHLORIDE 200 MCG: 10 INJECTION INTRAVENOUS at 19:49

## 2020-05-08 RX ADMIN — PANTOPRAZOLE SODIUM 40 MG: 40 INJECTION, POWDER, FOR SOLUTION INTRAVENOUS at 14:17

## 2020-05-08 RX ADMIN — PROPOFOL 100 MG: 10 INJECTION, EMULSION INTRAVENOUS at 19:35

## 2020-05-08 RX ADMIN — HEPARIN SODIUM 5000 UNITS: 10000 INJECTION INTRAVENOUS; SUBCUTANEOUS at 07:05

## 2020-05-08 RX ADMIN — SODIUM CHLORIDE 8 MG/HR: 9 INJECTION, SOLUTION INTRAVENOUS at 18:31

## 2020-05-08 RX ADMIN — Medication 500 MG: at 09:21

## 2020-05-08 RX ADMIN — CALCITRIOL 0.25 MCG: 0.25 CAPSULE ORAL at 09:22

## 2020-05-08 RX ADMIN — SUCCINYLCHOLINE CHLORIDE 100 MG: 20 INJECTION, SOLUTION INTRAMUSCULAR; INTRAVENOUS at 19:35

## 2020-05-08 RX ADMIN — ONDANSETRON 4 MG: 2 INJECTION INTRAMUSCULAR; INTRAVENOUS at 17:04

## 2020-05-08 RX ADMIN — EPOETIN ALFA-EPBX 3000 UNITS: 3000 INJECTION, SOLUTION INTRAVENOUS; SUBCUTANEOUS at 09:22

## 2020-05-08 RX ADMIN — SUCRALFATE 1 G: 1 TABLET ORAL at 23:57

## 2020-05-08 ASSESSMENT — PULMONARY FUNCTION TESTS
PIF_VALUE: 26
PIF_VALUE: 23
PIF_VALUE: 26
PIF_VALUE: 23
PIF_VALUE: 23
PIF_VALUE: 0
PIF_VALUE: 15
PIF_VALUE: 0
PIF_VALUE: 22
PIF_VALUE: 15
PIF_VALUE: 0
PIF_VALUE: 13
PIF_VALUE: 0
PIF_VALUE: 23
PIF_VALUE: 0
PIF_VALUE: 23
PIF_VALUE: 0
PIF_VALUE: 22
PIF_VALUE: 0
PIF_VALUE: 2
PIF_VALUE: 23
PIF_VALUE: 0
PIF_VALUE: 14
PIF_VALUE: 1
PIF_VALUE: 0
PIF_VALUE: 0
PIF_VALUE: 22
PIF_VALUE: 0
PIF_VALUE: 26
PIF_VALUE: 14
PIF_VALUE: 22
PIF_VALUE: 1
PIF_VALUE: 0
PIF_VALUE: 1
PIF_VALUE: 25
PIF_VALUE: 0
PIF_VALUE: 26
PIF_VALUE: 0
PIF_VALUE: 3
PIF_VALUE: 26
PIF_VALUE: 27
PIF_VALUE: 0
PIF_VALUE: 15
PIF_VALUE: 0
PIF_VALUE: 0
PIF_VALUE: 12
PIF_VALUE: 0
PIF_VALUE: 15
PIF_VALUE: 26
PIF_VALUE: 3

## 2020-05-08 ASSESSMENT — PAIN SCALES - GENERAL
PAINLEVEL_OUTOF10: 8
PAINLEVEL_OUTOF10: 7
PAINLEVEL_OUTOF10: 0

## 2020-05-08 ASSESSMENT — ENCOUNTER SYMPTOMS: SHORTNESS OF BREATH: 1

## 2020-05-08 NOTE — PROGRESS NOTES
Pharmacy Consultation Note  (Antibiotic Dosing and Monitoring)    Initial consult date: 20  Consulting physician: Dr. Sherri Griffiths  Drug(s): Vancomycin   Indication: Sacral decubitus ulcer    Ht Readings from Last 1 Encounters:   20 5' 1\" (1.549 m)       Age/Gender IBW DW  Allergy Information   78 y.o.  female, 105.2 kg 47.3 kg 70.5 kg  Patient has no known allergies. Date  WBC BUN/sCr UOP (mL/kg/hr) Drug/Dose Time   Given Level(s)   (Time) Comments   5/3  (#1) 21.2 68/4.3 --- Vancomycin 2000 mg IV x 1 1619       (#2) 17.1 70/4.4 --- No dose ---       (#3) 11 70/4.4 --- Vancomycin 1500 mg IV x 1 1632 Level = 9.6 @ 1230 Level originally scheduled to be drawn at 0600     (#4) 11.8 71/4.4 --- No dose --- Level = 30.9 mcg/mL @ 0545      (#5) 13.2 74/4.4 --- No dose --- Level = 20.5 mcg/mL @ 0445      (#6) 15.2 77/4.3 --- Vancomycin 1250 mg IV X1 <1730> 17.6 @ 1110      Other Antibiotics/Antifungals/Antivirals Start Date Stop Date Comments   Zosyn 3.375 g IV q 12 hr                          Estimated CrCL: 14 mL/min      Intake/Output Summary (Last 24 hours) at 2020 1020  Last data filed at 2020 4065  Gross per 24 hour   Intake 2549.58 ml   Output 1100 ml   Net 1449.58 ml       Temp max: Temp (24hrs), Av.2 °F (36.8 °C), Min:97.8 °F (36.6 °C), Max:98.7 °F (37.1 °C)      Cultures:    Culture Date Result    Wound (sacral ulcer)  Proteus mirabilis, Staph aureus, Enterococcus avium   Urine  Streptococcus species   96736 CFU/mL   Blood #1 5/3 No growth   Blood #2 5/3 No growth   Anaerobic (sacral)  Anaerobic GNR   Fungus (Sacral)  Candida albicans       Assessment:  · Consulted by Dr. Cori Castillo to dose/monitor vancomycin  · Goal trough level:  15-20 mcg/mL  · Patient is a 79 yo/F with a PMH significant for DM, HTN, CKD, sarcoidosis, and recently tested positive for COVID-19 who presented to [facility] ([facility] on 5/3 due to AMS.  Admitted for sepsis evaluation, COVID-19, and sacral decubitus ulcer. Received vancomycin 2000 mg IV x 1 in ER. · sCr = 4.4 mg/dL (baseline varies from 3.3 - 5.6 mg/dL)  · 5/5: Random vancomycin level = 9.6 mcg/mL  · 5/6: Random vancomycin level = 30.9 mcg/mL  · 5/7: Random vancomycin level = 20.5 mcg/mL  · 5/8: Random level= 17.6    Plan:  · Vancomycin 1250 mg IV X1  · Obtain vancomycin level tomorrow morning. · Due to poor renal function, will dose vancomycin based on levels. · Pharmacist will follow and monitor/adjust dosing as necessary. Thank you for this consult.     Henny Rivera, PharmD 5/8/2020 5:01 PM

## 2020-05-08 NOTE — PROGRESS NOTES
Dr. Ward Brunner on unit and states the patient should go to MICU. Message sent to Dr. Dariana Bell via perfect serve regarding if he can get the patient accepted to MICU. Patient's blood pressure went from  to SBP 86. Patient had another episode of bloody emesis.    Theotis Click

## 2020-05-08 NOTE — ANESTHESIA PRE PROCEDURE
Department of Anesthesiology  Preprocedure Note       Name:  Bud Son   Age:  78 y.o.  :  1950                                          MRN:  [id number]         Date:  2020      Surgeon: Trinh Argueta):  Jenn Morales MD    Procedure: EGD CONTROL HEMORRHAGE (N/A )    Medications prior to admission:   Prior to Admission medications    Medication Sig Start Date End Date Taking? Authorizing Provider   acetaminophen (TYLENOL) 325 MG tablet Take 650 mg by mouth every 6 hours as needed for Pain   Yes Historical Provider, MD   sulfamethoxazole-trimethoprim (BACTRIM DS;SEPTRA DS) 800-160 MG per tablet Take 1 tablet by mouth 2 times daily   Yes Historical Provider, MD   cephALEXin (KEFLEX) 500 MG capsule Take 500 mg by mouth 3 times daily   Yes Historical Provider, MD   collagenase 250 UNIT/GM ointment Apply topically daily Apply topically daily. Cleanse coccyx with NSS and cover with foam boarder dressing; change daily   Yes Historical Provider, MD   hydrOXYzine (VISTARIL) 25 MG capsule Take 25 mg by mouth every 6-8 hours as needed (agitation)   Yes Historical Provider, MD   oxyCODONE-acetaminophen (PERCOCET) 5-325 MG per tablet Take 1 tablet by mouth 3 times daily as needed for Pain for up to 30 days. 4/15/20 5/15/20 Yes Maegan Aguilera MD   sodium bicarbonate 650 MG tablet Take 2 tablets by mouth 2 times daily 4/15/20  Yes Maegan Aguilera MD   ALPRAZolam [facility]) 0.25 MG tablet Take 1 tablet by mouth nightly as needed for Sleep for up to 30 days. 4/15/20 5/15/20 Yes Maegan Aguilera MD   mineral oil-hydrophilic petrolatum (HYDROPHOR) ointment Apply topically as needed.  4/15/20  Yes Maegan Aguilera MD   polyethylene glycol [facility]) packet Take 17 g by mouth daily as needed for Constipation 4/15/20 5/15/20 Yes Maegan Aguilera MD   calcium acetate (PHOSLO) 667 MG capsule Take 2 capsules by mouth 3 times daily (with meals) 20  Yes Maegan Aguilera MD   hydrALAZINE (APRESOLINE) 50 MG tablet Take 1 tablet by mouth 3 times daily 5/3/19 injection 0.25 mg  0.25 mg Intravenous Q3H PRN Vesna Otoole MD   0.25 mg at 05/08/20 1707    0.9 % sodium chloride infusion admixture  25 mL Intravenous Q12H Cb Ordoñez, DO   Stopped at 05/08/20 2769    sodium chloride flush 0.9 % injection 10 mL  10 mL Intravenous PRN Cb Ordoñez, DO   10 mL at 05/08/20 7355    insulin lispro (HUMALOG) injection vial 0-18 Units  0-18 Units Subcutaneous Q4H Cb Ordoñez, DO   6 Units at 05/08/20 1446    glucose (GLUTOSE) 40 % oral gel 15 g  15 g Oral PRN Cb Ordoñez, DO        dextrose 50 % IV solution  12.5 g Intravenous PRN Cb Ordoñez, DO        glucagon (rDNA) injection 1 mg  1 mg Intramuscular PRN Cb Ordoñez, DO        dextrose 5 % solution  100 mL/hr Intravenous PRN Cb Ordoñez, DO        gabapentin (NEURONTIN) capsule 100 mg  100 mg Oral TID Cb Ordoñez, DO   100 mg at 05/08/20 8784    vancomycin (VANCOCIN) intermittent dosing (placeholder)   Other RX Placeholder Cb Ordoñez,         epoetin derek-epbx (RETACRIT) injection 3,000 Units  3,000 Units Subcutaneous Once per day on Mon Wed Fri Cb Ordoñez, DO   3,000 Units at 05/08/20 2488    oxyCODONE-acetaminophen (PERCOCET) 5-325 MG per tablet 1 tablet  1 tablet Oral Q4H PRN Cb Ordoñez,         Or    oxyCODONE-acetaminophen (PERCOCET) 5-325 MG per tablet 2 tablet  2 tablet Oral Q4H PRN Cb Ordoñez, DO   2 tablet at 05/08/20 0529    ALPRAZolam (XANAX) tablet 0.25 mg  0.25 mg Oral Nightly PRN Cb Ordoñez,         amLODIPine (NORVASC) tablet 10 mg  10 mg Oral Daily Cb Ordoñez, DO   10 mg at 05/08/20 3430    calcitRIOL (ROCALTROL) capsule 0.25 mcg  0.25 mcg Oral Daily Cora Jon Verdone, DO   0.25 mcg at 05/08/20 3056    calcium acetate (PHOSLO) capsule 1,334 mg  1,334 mg Oral TID  Cb Ordoñez DO   1,334 mg at 05/07/20 1610    hydrALAZINE (APRESOLINE)  Kidney disease     Kidney stones     Obesity     Sarcoidosis     SOBOE 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       Past Surgical History:        Procedure Laterality Date    ABDOMEN SURGERY N/A 2020    SACRAL WOUND DEBRIDEMENT CALL  WITH TIME AVAIL AM performed by Juan Haddad MD at [address]  x3   [address]  3/31/16    Laparoscopic-Dr. Pedro Raygoza    CYSTOSCOPY       for kidney stones    FOOT SURGERY       right     UPPER GASTROINTESTINAL ENDOSCOPY  2.18.15    Dr. Devonda Nyhan Findings: Mild Gastrits and Duodenitis, 2cm Hiatal Hernia       Social History:    Social History     Tobacco Use    Smoking status: Former Smoker     Packs/day: 1.00     Years: 30.00     Pack years: 30.00     Last attempt to quit: 2011     Years since quittin.7    Smokeless tobacco: Never Used   Substance Use Topics    Alcohol use:  No                                Counseling given: Not Answered      Vital Signs (Current):   Vitals:    20 1620 20 1637 20 1700 20 1824   BP: (!) 115/51 (!) 86/45 107/60 (!) 109/49   Pulse: 101 100  93   Resp: 16 16  12   Temp: 97.6 °F (36.4 °C) 98.1 °F (36.7 °C)  97.1 °F (36.2 °C)   TempSrc: Temporal Temporal  Temporal   SpO2: (!) 89% (!) 89%  93%   Weight:       Height:                                                  BP Readings from Last 3 Encounters:   20 (!) 109/49   20 99/82   04/15/20 (!) 154/68       NPO Status: Time of last liquid consumption: 0000                        Time of last solid consumption: (pt cannot remember, pt has been NPO since 0000)                        Date of last liquid consumption: 20                        Date of last solid food consumption: 20    BMI:   Wt Readings from Last 3 Encounters:   20 232 lb (105.2 kg)   20 238 lb 3.2 oz (108 kg)   20 258 lb (117 kg)     Body mass index is 43.84 The pulmonic valve was not well visualized. Pericardial Effusion   No evidence of a hemodynamically significant pericardial effusion. Aorta   Aortic root dimension within normal limits. Summary   Normal left ventricular systolic function. Ejection fraction is visually estimated at 60%. Mild concentric left ventricular hypertrophy. Normal right ventricular size and function (TAPSE 2.6 cm). Mild tricuspid regurgitation. Anesthesia Evaluation  Patient summary reviewed no history of anesthetic complications:   Airway: Mallampati: Unable to assess / NA        Dental:      Comment: None loose    Pulmonary:   (+) shortness of breath:  decreased breath sounds,                            ROS comment: +COVID-19   Cardiovascular:    (+) hypertension:, NAVARRO:,         Rhythm: regular  Rate: normal                    Neuro/Psych:   (+) psychiatric history:depression/anxiety             GI/Hepatic/Renal:   (+) renal disease: ESRD, morbid obesity          Endo/Other:    (+) Diabetespoorly controlled, , blood dyscrasia: anemia, arthritis:., .                 Abdominal:           Vascular:                                          Anesthesia Plan      general     ASA 4       Induction: intravenous and rapid sequence. MIPS: Postoperative opioids intended, Prophylactic antiemetics administered and Postoperative trial extubation. Plan discussed with attending.                   Breana Reynolds DO   5/8/2020

## 2020-05-08 NOTE — PROGRESS NOTES
Reason for Consult:  CKD4      History of Present Ilness: The pt is a 77 yo female with a pmh of djd, depression, dm, htm,sarcoidosis, nephrolithiasis is  followed by Dr Abdiel Marmolejo for her CKD4. She presented to the ED for confusion and sacral wound check. Patient has a coccyx pressure wound x several weeks but has worsened recently. She has been taking bactrim and Keflex since 4/29/2020. She reports intermittent fevers at skilled nursing facility as well as intermittent confusion therefore she was referred to ED for evaluation to r/o sepsis secondary to wound. Her baseline cr from 2/2020 was 4.2., was 3.1 in 8/2019. José Miguel Donis was here in Feb. 2020 for a UTI and PERLA with dehydration. On this admission her serum cr peaked at 5.6 and she had b/l non-obstructing stones. She again was hospitalized here April 11-15 after a fall. With this admission her serum cr was 4.8 upon admission and down to 3.8 at discharge on 4/15. 5/3/20 Urinalysis showed pyuria of 10-20 WBCs. Chest x-ray showed no infiltrates. She underwent bedside sharp excisional debridement of sacral pressure ulcer on 05/03 with wound culture showing mixed gram-positive organisms, gram-negative rods, Proteus species. Clindamycin, hydroxychloroquine, piperacillin-tazobactam, vancomycin were started. Her VS on admission 5/3/20 were 129/60, 99, 24 T-97.5. Other pertinent admission labs include Na+ 136, K+ 5.3, CO2 19, BUN 68, Cr. 4.3, Ca++ 8.2, Hgb 7.1, albumin 1.8. COVID 19 is positive. Subjective    5/5/20: debridement of sacral wounds last pm; no new c/o  5/6/20: No new issues overnight. 5/7/20: per nursing, pt is having nausea this am-medicated with tigan. Per ID, plan is for diverting colostomy to allow sacral wound to heal, and PICC line placement for iv antibiotics. 5/8/20:  Per nursing, pt is not eating or drinking today. Nurse also reports some possible third spacing today.     Current Medications:        Current Facility-Administered Medications: tablet 2 tablet, 2 tablet, Oral, Q4H PRN, Kan Nordmann, DO, 2 tablet at 05/08/20 0529    ALPRAZolam [place] [facility]) tablet 0.25 mg, 0.25 mg, Oral, Nightly PRN, Kan Nordmann, DO    amLODIPine (NORVASC) tablet 10 mg, 10 mg, Oral, Daily, Kan Nordmann, DO, 10 mg at 05/06/20 3832    calcitRIOL (ROCALTROL) capsule 0.25 mcg, 0.25 mcg, Oral, Daily, Kan Nordmann, DO, 0.25 mcg at 05/06/20 0950    calcium acetate (PHOSLO) capsule 1,334 mg, 1,334 mg, Oral, TID WC, Kan Nordmann, DO, 1,334 mg at 05/07/20 1610    hydrALAZINE (APRESOLINE) tablet 50 mg, 50 mg, Oral, TID, Kan Nordmann, DO, 50 mg at 05/07/20 2200    polyethylene glycol (GLYCOLAX) packet 17 g, 17 g, Oral, Daily PRN, Kan Nordmann, DO    sodium bicarbonate tablet 1,300 mg, 1,300 mg, Oral, BID, Wyvonnia Tunde Verdone, DO, 1,300 mg at 05/07/20 2200    acetaminophen (TYLENOL) tablet 650 mg, 650 mg, Oral, Q4H PRN, Kan Nordmann, DO    0.9 % sodium chloride infusion, , Intravenous, Continuous, Toña Moya MD, Last Rate: 75 mL/hr at 05/07/20 1249    vitamin C (ASCORBIC ACID) tablet 500 mg, 500 mg, Oral, Daily, Kan Nordmann, DO, 500 mg at 05/06/20 0950    zinc tablet 50 mg, 50 mg, Oral, Daily, Kan Nordmann, DO, 50 mg at 05/06/20 0949    heparin (porcine) injection 5,000 Units, 5,000 Units, Subcutaneous, Q8H, Kan Nordmann, DO, 5,000 Units at 05/08/20 0705    piperacillin-tazobactam (ZOSYN) 3.375 g in dextrose 5 % 100 mL IVPB extended infusion (mini-bag), 3.375 g, Intravenous, Q12H, Kan Nordmann, DO, Stopped at 05/08/20 0210    Allergies:  Patient has no known allergies. Review of Systems:   Pertinent positives stated above in HPI. All other systems were reviewed and were negative.     Physical exam:   Constitutional:  BP (!) 126/59   Pulse 112   Temp 98.7 °F (37.1 °C) (Temporal)   Resp 16   Ht 5' 1\" (1.549 m)   Wt 232 lb (105.2 kg)   SpO2 92%   BMI 43.84 following    4. Anemia of CKD  Hgb 7.1 -> 6.1 - s/p 2 units pRBCs-->8.7--> 5.7  On 5/8 transfuse another 2 units PRBCs  Start ARUN   Iron sat, TIBC, folate low, no iron supplementation with infection  Transfuse for hgb below 7.    5. Secondary hyperparathyrodism of renal origin  Follow closely on vit d analogue due to sarcoid and h/o stones  Ca 8.2, PO4 4.9  PTH 2/1/20 was 336, 5/5   On rocaltrol, phoslo     6. Hyperkalemia with CKD  On admission 5.3 5.4 on 5/7. Results pending today. Stated veltassa 5/7  Will follow     7. Metabolic acidosis with CKD, sepsis  CO2 19 on admission, 20 today just below target of >22    6. HTN in CKD Stages I-IV  BP at target of <130/80  Follow on norvasc, apresoline     Thank you for the opportunity to participate in the care of [facility]. D/T pt's refusal to eat or drink, she may need evaluated for peg tube placement. May also need HD soon. PABLO Tolentino - CNP     Patient seen and examined all key components of the physical performed independently , case discussed with NP, all pertinent labs and radiologic tests personally reviewed agree with above.     -Cr remains essentially unchanged; she is non oliguric; will continue IVF  -Anemia due to blood loss; Hb 5.7, for PRBC transfusion  -Continue to monitor renal function; initiate IHD with any worsening  Angela Ta MD              .

## 2020-05-08 NOTE — PROGRESS NOTES
Patient seen and examined. Hypotensive to the 80s with multiple episodes of dark hematemesis. She is lethargic but arouseable. NGT placed with appx 50cc of the same dark bloody return. 1U hung and transfusing - pressure responsive. Discussed with Dr Faiza Dean who accepted patient to NSICU. Discussed case with Vamshi Gutierrez. Continue transfusion. PPI gtt. Plan for EGD in OR this evening.      Jammie Morgan DO  5/8/20  5:57 PM EDT

## 2020-05-08 NOTE — PROGRESS NOTES
Message sent to [facility] Dr. Devonda Nyhan to notify her of patient throwing up blood and having another large bloody  bowel movement. Patient is nauseous and only has oral tigan ordered.    Lillian Urbano

## 2020-05-08 NOTE — PROGRESS NOTES
[facility] PROGRESS NOTE      Chief complaint: Follow-up of infected sacral pressure ulcer and COVID-19    The patient is a 78 y.o. female with history of DM, hypertension, CKD, sarcoidosis, presented on 05/03 with worsening pain, drainage, malodor from sacral pressure ulcer which had developed within the past 3 to 4 weeks and has been prescribed co-trimoxazole and cephalosporins since 04/29. She reports no cough, no shortness of breath, no diarrhea, no dysuria. On admission, she was afebrile and hemodynamically stable with leukocytosis of 21,000. SARS-CoV-2 PCR was detected. Urinalysis showed pyuria of 10-20 WBCs. Chest x-ray showed no infiltrates. She underwent bedside sharp excisional debridement of sacral pressure ulcer on 05/03 with wound culture showing mixed gram-positive organisms, gram-negative rods, Proteus species, moderate growth of MRSA (susceptible to doxycycline and cotrimoxazole). Clindamycin, hydroxychloroquine, piperacillin-tazobactam, vancomycin were started. She underwent excisional debridement of skin, subcutaneous tissue, muscle, and fascia of sacral and gluteal region on 05/04 during which no rectal communication was noted though the wound did extend caudal to the coccyx and just posterior to the rectum. Tissue Gram stain and culture showed few polymorphonuclear leukocytes, no epithelial cells, rare Gram-positive cocci in pairs, rare Gram-variable rods, moderate growth of Proteus mirabilis, rare growth of Enterococcus avium (susceotible to ampicillin), heavy growth of anaerobic beta-lactamase negative Gram-negative rods, light growth of Candida albicans. She had bloody diarrhea on 05/07. Subjective: Patient was seen and examined. She is awake but responds minimally to questions today.     Objective:    Vitals:    05/08/20 0915   BP: (!) 128/58   Pulse: 107   Resp: 18   Temp: 98.1 °F (36.7 °C)   SpO2: 95%     Constitutional: Alert, not in distress  Respiratory: Clear breath sounds, no crackles, no wheezes  Cardiovascular: Regular rate and rhythm, no murmurs  Gastrointestinal: Bowel sounds present, soft, nontender  Skin: Warm and dry, no active dermatoses  Musculoskeletal: No joint swelling, no joint erythema      Labs, imaging, and medical records/notes were personally reviewed. Assessment:  Sepsis, secondary to infected sacral pressure ulcer s/p bedside debridement on 05/03. Tissue culture grew Enterococcus avium, Proteus mirabilis, Candida albicans. SARS-CoV-2 infection    Recommendations:  Continue piperacillin-tazobactam and vancomycin dosed according to trough per pharmacy. Anticipate 6 weeks of IV antibiotic therapy. Start fluconazole 400 mg loading dose then 200 mg q24h thereafter. Recommend diverting colostomy. Defer decision to General Surgery. Follow up stool C difficile toxin assay. Follow up blood cultures. Remove right IJ TLC. Insert PICC. Risks,benefits, alternative options and potential complications associated with the procedure(s) to be performed were explained to the patient. Thank you for involving me in the care of [facility]. I will continue to follow. Please do not hesitate to call for any questions or concerns.     Electronically signed by Eduarda Bolton MD on 5/8/2020 at 10:46 AM

## 2020-05-09 ENCOUNTER — APPOINTMENT (OUTPATIENT)
Dept: GENERAL RADIOLOGY | Age: 70
DRG: 853 | End: 2020-05-09
Payer: MEDICARE

## 2020-05-09 ENCOUNTER — ANESTHESIA EVENT (OUTPATIENT)
Dept: INTERVENTIONAL RADIOLOGY/VASCULAR | Age: 70
DRG: 853 | End: 2020-05-09
Payer: MEDICARE

## 2020-05-09 ENCOUNTER — ANESTHESIA (OUTPATIENT)
Dept: INTERVENTIONAL RADIOLOGY/VASCULAR | Age: 70
DRG: 853 | End: 2020-05-09
Payer: MEDICARE

## 2020-05-09 ENCOUNTER — APPOINTMENT (OUTPATIENT)
Dept: INTERVENTIONAL RADIOLOGY/VASCULAR | Age: 70
DRG: 853 | End: 2020-05-09
Payer: MEDICARE

## 2020-05-09 VITALS
RESPIRATION RATE: 14 BRPM | DIASTOLIC BLOOD PRESSURE: 37 MMHG | SYSTOLIC BLOOD PRESSURE: 92 MMHG | OXYGEN SATURATION: 98 %

## 2020-05-09 LAB
AADO2: 232.2 MMHG
ABO/RH: NORMAL
ALBUMIN SERPL-MCNC: 1.5 G/DL (ref 3.5–5.2)
ALP BLD-CCNC: 676 U/L (ref 35–104)
ALT SERPL-CCNC: 43 U/L (ref 0–32)
ANGLE (CLOT STRENGTH): 69.3 DEGREE (ref 59–74)
ANION GAP SERPL CALCULATED.3IONS-SCNC: 12 MMOL/L (ref 7–16)
ANISOCYTOSIS: ABNORMAL
ANTIBODY SCREEN: NORMAL
AST SERPL-CCNC: 81 U/L (ref 0–31)
B.E.: -2.6 MMOL/L (ref -3–3)
B.E.: -4.1 MMOL/L (ref -3–0)
BASOPHILS ABSOLUTE: 0 E9/L (ref 0–0.2)
BASOPHILS RELATIVE PERCENT: 0.2 % (ref 0–2)
BILIRUB SERPL-MCNC: <0.2 MG/DL (ref 0–1.2)
BLOOD BANK DISPENSE STATUS: NORMAL
BLOOD BANK PRODUCT CODE: NORMAL
BPU ID: NORMAL
BUN BLDV-MCNC: 83 MG/DL (ref 8–23)
BURR CELLS: ABNORMAL
CALCIUM SERPL-MCNC: 7.4 MG/DL (ref 8.6–10.2)
CARDIOPULMONARY BYPASS: NO
CHLORIDE BLD-SCNC: 112 MMOL/L (ref 98–107)
CO2: 20 MMOL/L (ref 22–29)
COHB: 0 % (ref 0–1.5)
CREAT SERPL-MCNC: 4.6 MG/DL (ref 0.5–1)
CRITICAL: ABNORMAL
DATE ANALYZED: ABNORMAL
DATE OF COLLECTION: ABNORMAL
DESCRIPTION BLOOD BANK: NORMAL
DEVICE: ABNORMAL
EOSINOPHILS ABSOLUTE: 0.15 E9/L (ref 0.05–0.5)
EOSINOPHILS RELATIVE PERCENT: 0.9 % (ref 0–6)
EPL-TEG: 0 % (ref 0–15)
FIO2: 60 %
G-TEG: 12.1 K D/SC (ref 4.5–11)
GFR AFRICAN AMERICAN: 11
GFR NON-AFRICAN AMERICAN: 9 ML/MIN/1.73
GLUCOSE BLD-MCNC: 169 MG/DL (ref 74–99)
HCO3 ARTERIAL: 21.8 MMOL/L (ref 22–26)
HCO3: 21.3 MMOL/L (ref 22–26)
HCT (EST): 17 % (ref 34–48)
HCT VFR BLD CALC: 20.6 % (ref 34–48)
HCT VFR BLD CALC: 21.6 % (ref 34–48)
HCT VFR BLD CALC: 33.4 % (ref 34–48)
HEMOGLOBIN: 11.4 G/DL (ref 11.5–15.5)
HEMOGLOBIN: 6.6 G/DL (ref 11.5–15.5)
HEMOGLOBIN: 6.9 G/DL (ref 11.5–15.5)
HGB, (EST): 5.8 G/DL (ref 11.5–15.5)
HHB: 1.5 % (ref 0–5)
HYPOCHROMIA: ABNORMAL
K (CLOTTING TIME): 1.5 MIN (ref 1–3)
LAB: ABNORMAL
LY30 (FIBRINOLYSIS): 0 % (ref 0–8)
LYMPHOCYTES ABSOLUTE: 0.66 E9/L (ref 1.5–4)
LYMPHOCYTES RELATIVE PERCENT: 3.5 % (ref 20–42)
Lab: ABNORMAL
MA (MAX AMPLITUDE): 70.7 MM (ref 50–70)
MAGNESIUM: 1.9 MG/DL (ref 1.6–2.6)
MCH RBC QN AUTO: 29.7 PG (ref 26–35)
MCH RBC QN AUTO: 29.9 PG (ref 26–35)
MCH RBC QN AUTO: 30.7 PG (ref 26–35)
MCHC RBC AUTO-ENTMCNC: 31.9 % (ref 32–34.5)
MCHC RBC AUTO-ENTMCNC: 32 % (ref 32–34.5)
MCHC RBC AUTO-ENTMCNC: 34.1 % (ref 32–34.5)
MCV RBC AUTO: 90 FL (ref 80–99.9)
MCV RBC AUTO: 93.1 FL (ref 80–99.9)
MCV RBC AUTO: 93.2 FL (ref 80–99.9)
METAMYELOCYTES RELATIVE PERCENT: 2.6 % (ref 0–1)
METER GLUCOSE: 150 MG/DL (ref 74–99)
METER GLUCOSE: 194 MG/DL (ref 74–99)
METER GLUCOSE: 310 MG/DL (ref 74–99)
METER GLUCOSE: 56 MG/DL (ref 74–99)
METER GLUCOSE: 58 MG/DL (ref 74–99)
METER GLUCOSE: 81 MG/DL (ref 74–99)
METER GLUCOSE: 93 MG/DL (ref 74–99)
METHB: 0.3 % (ref 0–1.5)
MODE: AC
MONOCYTES ABSOLUTE: 0.33 E9/L (ref 0.1–0.95)
MONOCYTES RELATIVE PERCENT: 1.7 % (ref 2–12)
MYELOCYTE PERCENT: 1.7 % (ref 0–0)
NEUTROPHILS ABSOLUTE: 15.25 E9/L (ref 1.8–7.3)
NEUTROPHILS RELATIVE PERCENT: 88.7 % (ref 43–80)
O2 CONTENT: 17 ML/DL
O2 SATURATION: 97.8 % (ref 92–98.5)
O2 SATURATION: 98.5 % (ref 92–98.5)
O2HB: 98.2 % (ref 94–97)
OPERATOR ID: 421
OPERATOR ID: 4510
OVALOCYTES: ABNORMAL
PATIENT TEMP: 37 C
PCO2 ARTERIAL: 43.7 MMHG (ref 35–45)
PCO2: 33.7 MMHG (ref 35–45)
PDW BLD-RTO: 15.2 FL (ref 11.5–15)
PDW BLD-RTO: 15.4 FL (ref 11.5–15)
PDW BLD-RTO: 15.8 FL (ref 11.5–15)
PEEP/CPAP: 5 CMH2O
PFO2: 2.39 MMHG/%
PH BLOOD GAS: 7.31 (ref 7.35–7.45)
PH BLOOD GAS: 7.42 (ref 7.35–7.45)
PHOSPHORUS: 7.3 MG/DL (ref 2.5–4.5)
PLATELET # BLD: 117 E9/L (ref 130–450)
PLATELET # BLD: 163 E9/L (ref 130–450)
PLATELET # BLD: 180 E9/L (ref 130–450)
PMV BLD AUTO: 10.2 FL (ref 7–12)
PMV BLD AUTO: 9.5 FL (ref 7–12)
PMV BLD AUTO: 9.7 FL (ref 7–12)
PO2 ARTERIAL: 110.4 MMHG (ref 60–80)
PO2: 143.5 MMHG (ref 75–100)
POIKILOCYTES: ABNORMAL
POTASSIUM SERPL-SCNC: 4.2 MMOL/L (ref 3.5–5.5)
POTASSIUM SERPL-SCNC: 5.4 MMOL/L (ref 3.5–5)
PROMYELOCYTES PERCENT: 0.9 % (ref 0–0)
R (REACTION TIME): 9.3 MIN (ref 5–10)
RBC # BLD: 2.21 E12/L (ref 3.5–5.5)
RBC # BLD: 2.32 E12/L (ref 3.5–5.5)
RBC # BLD: 3.71 E12/L (ref 3.5–5.5)
RI(T): 1.62
RR MECHANICAL: 21 B/MIN
SCHISTOCYTES: ABNORMAL
SODIUM BLD-SCNC: 144 MMOL/L (ref 132–146)
SOURCE, BLOOD GAS: ABNORMAL
SOURCE, BLOOD GAS: ABNORMAL
THB: 12.1 G/DL (ref 11.5–16.5)
TIME ANALYZED: 2202
TOTAL PROTEIN: 4.3 G/DL (ref 6.4–8.3)
VANCOMYCIN RANDOM: 32 MCG/ML (ref 5–40)
VT MECHANICAL: 400 ML
WBC # BLD: 15.6 E9/L (ref 4.5–11.5)
WBC # BLD: 16.4 E9/L (ref 4.5–11.5)
WBC # BLD: 20.8 E9/L (ref 4.5–11.5)

## 2020-05-09 PROCEDURE — P9016 RBC LEUKOCYTES REDUCED: HCPCS

## 2020-05-09 PROCEDURE — 6360000002 HC RX W HCPCS: Performed by: NURSE ANESTHETIST, CERTIFIED REGISTERED

## 2020-05-09 PROCEDURE — 2580000003 HC RX 258: Performed by: NURSE ANESTHETIST, CERTIFIED REGISTERED

## 2020-05-09 PROCEDURE — 85384 FIBRINOGEN ACTIVITY: CPT

## 2020-05-09 PROCEDURE — 6360000004 HC RX CONTRAST MEDICATION: Performed by: RADIOLOGY

## 2020-05-09 PROCEDURE — 37244 VASC EMBOLIZE/OCCLUDE BLEED: CPT

## 2020-05-09 PROCEDURE — 36247 INS CATH ABD/L-EXT ART 3RD: CPT

## 2020-05-09 PROCEDURE — 80053 COMPREHEN METABOLIC PANEL: CPT

## 2020-05-09 PROCEDURE — 36415 COLL VENOUS BLD VENIPUNCTURE: CPT

## 2020-05-09 PROCEDURE — 6360000002 HC RX W HCPCS: Performed by: STUDENT IN AN ORGANIZED HEALTH CARE EDUCATION/TRAINING PROGRAM

## 2020-05-09 PROCEDURE — 80202 ASSAY OF VANCOMYCIN: CPT

## 2020-05-09 PROCEDURE — 6370000000 HC RX 637 (ALT 250 FOR IP): Performed by: STUDENT IN AN ORGANIZED HEALTH CARE EDUCATION/TRAINING PROGRAM

## 2020-05-09 PROCEDURE — 2500000003 HC RX 250 WO HCPCS: Performed by: NURSE ANESTHETIST, CERTIFIED REGISTERED

## 2020-05-09 PROCEDURE — 86706 HEP B SURFACE ANTIBODY: CPT

## 2020-05-09 PROCEDURE — C9113 INJ PANTOPRAZOLE SODIUM, VIA: HCPCS | Performed by: STUDENT IN AN ORGANIZED HEALTH CARE EDUCATION/TRAINING PROGRAM

## 2020-05-09 PROCEDURE — G0269 OCCLUSIVE DEVICE IN VEIN ART: HCPCS

## 2020-05-09 PROCEDURE — 0KBP0ZZ EXCISION OF LEFT HIP MUSCLE, OPEN APPROACH: ICD-10-PCS | Performed by: STUDENT IN AN ORGANIZED HEALTH CARE EDUCATION/TRAINING PROGRAM

## 2020-05-09 PROCEDURE — 6370000000 HC RX 637 (ALT 250 FOR IP): Performed by: INTERNAL MEDICINE

## 2020-05-09 PROCEDURE — 80074 ACUTE HEPATITIS PANEL: CPT

## 2020-05-09 PROCEDURE — 85576 BLOOD PLATELET AGGREGATION: CPT

## 2020-05-09 PROCEDURE — 75774 ARTERY X-RAY EACH VESSEL: CPT

## 2020-05-09 PROCEDURE — 2580000003 HC RX 258: Performed by: STUDENT IN AN ORGANIZED HEALTH CARE EDUCATION/TRAINING PROGRAM

## 2020-05-09 PROCEDURE — 83735 ASSAY OF MAGNESIUM: CPT

## 2020-05-09 PROCEDURE — 36592 COLLECT BLOOD FROM PICC: CPT

## 2020-05-09 PROCEDURE — 94770 HC ETCO2 MONITOR DAILY: CPT

## 2020-05-09 PROCEDURE — 86850 RBC ANTIBODY SCREEN: CPT

## 2020-05-09 PROCEDURE — 82805 BLOOD GASES W/O2 SATURATION: CPT

## 2020-05-09 PROCEDURE — 85027 COMPLETE CBC AUTOMATED: CPT

## 2020-05-09 PROCEDURE — 86901 BLOOD TYPING SEROLOGIC RH(D): CPT

## 2020-05-09 PROCEDURE — 2580000003 HC RX 258

## 2020-05-09 PROCEDURE — 3700000000 HC ANESTHESIA ATTENDED CARE

## 2020-05-09 PROCEDURE — 2780000010 IR EMBOLIZATION HEMORRHAGE

## 2020-05-09 PROCEDURE — 90935 HEMODIALYSIS ONE EVALUATION: CPT

## 2020-05-09 PROCEDURE — 6360000002 HC RX W HCPCS: Performed by: RADIOLOGY

## 2020-05-09 PROCEDURE — 82803 BLOOD GASES ANY COMBINATION: CPT

## 2020-05-09 PROCEDURE — 0KBN0ZZ EXCISION OF RIGHT HIP MUSCLE, OPEN APPROACH: ICD-10-PCS | Performed by: STUDENT IN AN ORGANIZED HEALTH CARE EDUCATION/TRAINING PROGRAM

## 2020-05-09 PROCEDURE — 86900 BLOOD TYPING SEROLOGIC ABO: CPT

## 2020-05-09 PROCEDURE — 3700000001 HC ADD 15 MINUTES (ANESTHESIA)

## 2020-05-09 PROCEDURE — 86923 COMPATIBILITY TEST ELECTRIC: CPT

## 2020-05-09 PROCEDURE — 6360000002 HC RX W HCPCS

## 2020-05-09 PROCEDURE — 02HV33Z INSERTION OF INFUSION DEVICE INTO SUPERIOR VENA CAVA, PERCUTANEOUS APPROACH: ICD-10-PCS | Performed by: SURGERY

## 2020-05-09 PROCEDURE — 75726 ARTERY X-RAYS ABDOMEN: CPT

## 2020-05-09 PROCEDURE — 2580000003 HC RX 258: Performed by: INTERNAL MEDICINE

## 2020-05-09 PROCEDURE — 94002 VENT MGMT INPAT INIT DAY: CPT

## 2020-05-09 PROCEDURE — 2000000000 HC ICU R&B

## 2020-05-09 PROCEDURE — 85347 COAGULATION TIME ACTIVATED: CPT

## 2020-05-09 PROCEDURE — 84100 ASSAY OF PHOSPHORUS: CPT

## 2020-05-09 PROCEDURE — 36245 INS CATH ABD/L-EXT ART 1ST: CPT

## 2020-05-09 PROCEDURE — 04V23DZ RESTRICTION OF GASTRIC ARTERY WITH INTRALUMINAL DEVICE, PERCUTANEOUS APPROACH: ICD-10-PCS | Performed by: RADIOLOGY

## 2020-05-09 PROCEDURE — 36430 TRANSFUSION BLD/BLD COMPNT: CPT

## 2020-05-09 PROCEDURE — 82962 GLUCOSE BLOOD TEST: CPT

## 2020-05-09 PROCEDURE — 85025 COMPLETE CBC W/AUTO DIFF WBC: CPT

## 2020-05-09 RX ORDER — 0.9 % SODIUM CHLORIDE 0.9 %
20 INTRAVENOUS SOLUTION INTRAVENOUS ONCE
Status: DISCONTINUED | OUTPATIENT
Start: 2020-05-09 | End: 2020-05-13

## 2020-05-09 RX ORDER — HEPARIN SODIUM 10000 [USP'U]/ML
INJECTION, SOLUTION INTRAVENOUS; SUBCUTANEOUS
Status: COMPLETED | OUTPATIENT
Start: 2020-05-09 | End: 2020-05-09

## 2020-05-09 RX ORDER — DEXTROSE AND SODIUM CHLORIDE 5; .9 G/100ML; G/100ML
INJECTION, SOLUTION INTRAVENOUS CONTINUOUS
Status: DISCONTINUED | OUTPATIENT
Start: 2020-05-09 | End: 2020-05-12

## 2020-05-09 RX ORDER — SUCCINYLCHOLINE CHLORIDE 20 MG/ML
INJECTION INTRAMUSCULAR; INTRAVENOUS PRN
Status: DISCONTINUED | OUTPATIENT
Start: 2020-05-09 | End: 2020-05-09 | Stop reason: SDUPTHER

## 2020-05-09 RX ORDER — FENTANYL CITRATE 50 UG/ML
INJECTION, SOLUTION INTRAMUSCULAR; INTRAVENOUS PRN
Status: DISCONTINUED | OUTPATIENT
Start: 2020-05-09 | End: 2020-05-09 | Stop reason: SDUPTHER

## 2020-05-09 RX ORDER — PROPOFOL 10 MG/ML
INJECTION, EMULSION INTRAVENOUS CONTINUOUS PRN
Status: DISCONTINUED | OUTPATIENT
Start: 2020-05-09 | End: 2020-05-09 | Stop reason: SDUPTHER

## 2020-05-09 RX ORDER — PROPOFOL 10 MG/ML
INJECTION, EMULSION INTRAVENOUS PRN
Status: DISCONTINUED | OUTPATIENT
Start: 2020-05-09 | End: 2020-05-09 | Stop reason: SDUPTHER

## 2020-05-09 RX ORDER — VECURONIUM BROMIDE 1 MG/ML
INJECTION, POWDER, LYOPHILIZED, FOR SOLUTION INTRAVENOUS PRN
Status: DISCONTINUED | OUTPATIENT
Start: 2020-05-09 | End: 2020-05-09 | Stop reason: SDUPTHER

## 2020-05-09 RX ORDER — MIDAZOLAM HYDROCHLORIDE 1 MG/ML
INJECTION INTRAMUSCULAR; INTRAVENOUS PRN
Status: DISCONTINUED | OUTPATIENT
Start: 2020-05-09 | End: 2020-05-09 | Stop reason: SDUPTHER

## 2020-05-09 RX ORDER — SODIUM CHLORIDE 9 MG/ML
INJECTION, SOLUTION INTRAVENOUS CONTINUOUS PRN
Status: DISCONTINUED | OUTPATIENT
Start: 2020-05-09 | End: 2020-05-09 | Stop reason: SDUPTHER

## 2020-05-09 RX ADMIN — SODIUM CHLORIDE 8 MG/HR: 9 INJECTION, SOLUTION INTRAVENOUS at 11:07

## 2020-05-09 RX ADMIN — GABAPENTIN 100 MG: 100 CAPSULE ORAL at 21:08

## 2020-05-09 RX ADMIN — DEXTROSE MONOHYDRATE 12.5 G: 25 INJECTION, SOLUTION INTRAVENOUS at 15:50

## 2020-05-09 RX ADMIN — GABAPENTIN 100 MG: 100 CAPSULE ORAL at 09:50

## 2020-05-09 RX ADMIN — Medication 5000 UNITS: at 14:21

## 2020-05-09 RX ADMIN — SODIUM BICARBONATE 1300 MG: 650 TABLET ORAL at 21:48

## 2020-05-09 RX ADMIN — PHENYLEPHRINE HYDROCHLORIDE 100 MCG: 10 INJECTION INTRAVENOUS at 13:00

## 2020-05-09 RX ADMIN — FENTANYL CITRATE 100 MCG: 50 INJECTION, SOLUTION INTRAMUSCULAR; INTRAVENOUS at 12:52

## 2020-05-09 RX ADMIN — PHENYLEPHRINE HYDROCHLORIDE 100 MCG: 10 INJECTION INTRAVENOUS at 13:10

## 2020-05-09 RX ADMIN — PROPOFOL 130 MG: 10 INJECTION, EMULSION INTRAVENOUS at 12:52

## 2020-05-09 RX ADMIN — Medication 50 MG: at 09:47

## 2020-05-09 RX ADMIN — VECURONIUM BROMIDE FOR INJECTION 5 MG: 1 INJECTION, POWDER, LYOPHILIZED, FOR SOLUTION INTRAVENOUS at 13:02

## 2020-05-09 RX ADMIN — SODIUM CHLORIDE: 9 INJECTION, SOLUTION INTRAVENOUS at 12:45

## 2020-05-09 RX ADMIN — SODIUM CHLORIDE 8 MG/HR: 9 INJECTION, SOLUTION INTRAVENOUS at 01:36

## 2020-05-09 RX ADMIN — VECURONIUM BROMIDE FOR INJECTION 1 MG: 1 INJECTION, POWDER, LYOPHILIZED, FOR SOLUTION INTRAVENOUS at 12:52

## 2020-05-09 RX ADMIN — Medication 500 MG: at 09:50

## 2020-05-09 RX ADMIN — CALCITRIOL 0.25 MCG: 0.25 CAPSULE ORAL at 09:47

## 2020-05-09 RX ADMIN — PROPOFOL 20 MCG/KG/MIN: 10 INJECTION, EMULSION INTRAVENOUS at 12:52

## 2020-05-09 RX ADMIN — IOVERSOL 255 ML: 678 INJECTION INTRA-ARTERIAL; INTRAVENOUS at 15:50

## 2020-05-09 RX ADMIN — VECURONIUM BROMIDE FOR INJECTION 2 MG: 1 INJECTION, POWDER, LYOPHILIZED, FOR SOLUTION INTRAVENOUS at 14:58

## 2020-05-09 RX ADMIN — SODIUM CHLORIDE, PRESERVATIVE FREE 300 UNITS: 5 INJECTION INTRAVENOUS at 21:09

## 2020-05-09 RX ADMIN — PIPERACILLIN AND TAZOBACTAM 3.38 G: 3; .375 INJECTION, POWDER, FOR SOLUTION INTRAVENOUS at 00:57

## 2020-05-09 RX ADMIN — INSULIN LISPRO 12 UNITS: 100 INJECTION, SOLUTION INTRAVENOUS; SUBCUTANEOUS at 09:51

## 2020-05-09 RX ADMIN — Medication 5000 UNITS: at 14:20

## 2020-05-09 RX ADMIN — DEXTROSE AND SODIUM CHLORIDE: 5; 900 INJECTION, SOLUTION INTRAVENOUS at 19:00

## 2020-05-09 RX ADMIN — SUCCINYLCHOLINE CHLORIDE 80 MG: 20 INJECTION, SOLUTION INTRAMUSCULAR; INTRAVENOUS at 12:52

## 2020-05-09 RX ADMIN — PIPERACILLIN AND TAZOBACTAM 3.38 G: 3; .375 INJECTION, POWDER, FOR SOLUTION INTRAVENOUS at 21:09

## 2020-05-09 RX ADMIN — Medication 5000 UNITS: at 14:22

## 2020-05-09 RX ADMIN — HYDRALAZINE HYDROCHLORIDE 50 MG: 25 TABLET, FILM COATED ORAL at 21:47

## 2020-05-09 RX ADMIN — MIDAZOLAM 2 MG: 1 INJECTION INTRAMUSCULAR; INTRAVENOUS at 12:52

## 2020-05-09 RX ADMIN — FLUCONAZOLE 200 MG: 100 TABLET ORAL at 09:50

## 2020-05-09 RX ADMIN — OXYCODONE HYDROCHLORIDE AND ACETAMINOPHEN 1 TABLET: 5; 325 TABLET ORAL at 21:08

## 2020-05-09 RX ADMIN — SODIUM CHLORIDE, PRESERVATIVE FREE 10 ML: 5 INJECTION INTRAVENOUS at 21:09

## 2020-05-09 RX ADMIN — SODIUM BICARBONATE 1300 MG: 650 TABLET ORAL at 09:48

## 2020-05-09 RX ADMIN — MIDAZOLAM 1 MG: 1 INJECTION INTRAMUSCULAR; INTRAVENOUS at 14:47

## 2020-05-09 RX ADMIN — INSULIN LISPRO 3 UNITS: 100 INJECTION, SOLUTION INTRAVENOUS; SUBCUTANEOUS at 05:15

## 2020-05-09 RX ADMIN — PHENYLEPHRINE HYDROCHLORIDE 100 MCG: 10 INJECTION INTRAVENOUS at 12:52

## 2020-05-09 RX ADMIN — DEXTROSE MONOHYDRATE 12.5 G: 25 INJECTION, SOLUTION INTRAVENOUS at 12:36

## 2020-05-09 RX ADMIN — VECURONIUM BROMIDE FOR INJECTION 2 MG: 1 INJECTION, POWDER, LYOPHILIZED, FOR SOLUTION INTRAVENOUS at 14:05

## 2020-05-09 RX ADMIN — MIDAZOLAM 1 MG: 1 INJECTION INTRAMUSCULAR; INTRAVENOUS at 14:05

## 2020-05-09 RX ADMIN — VANCOMYCIN HYDROCHLORIDE 1250 MG: 10 INJECTION, POWDER, LYOPHILIZED, FOR SOLUTION INTRAVENOUS at 00:56

## 2020-05-09 RX ADMIN — PIPERACILLIN AND TAZOBACTAM 3.38 G: 3; .375 INJECTION, POWDER, FOR SOLUTION INTRAVENOUS at 09:52

## 2020-05-09 RX ADMIN — INSULIN LISPRO 3 UNITS: 100 INJECTION, SOLUTION INTRAVENOUS; SUBCUTANEOUS at 00:11

## 2020-05-09 ASSESSMENT — PULMONARY FUNCTION TESTS
PIF_VALUE: 0
PIF_VALUE: 26
PIF_VALUE: 0
PIF_VALUE: 18
PIF_VALUE: 0
PIF_VALUE: 29
PIF_VALUE: 0
PIF_VALUE: 1
PIF_VALUE: 27
PIF_VALUE: 5
PIF_VALUE: 0
PIF_VALUE: 29
PIF_VALUE: 0
PIF_VALUE: 30
PIF_VALUE: 0
PIF_VALUE: 5
PIF_VALUE: 0
PIF_VALUE: 28
PIF_VALUE: 0
PIF_VALUE: 1
PIF_VALUE: 0
PIF_VALUE: 0
PIF_VALUE: 28
PIF_VALUE: 0
PIF_VALUE: 27
PIF_VALUE: 0
PIF_VALUE: 26
PIF_VALUE: 0
PIF_VALUE: 33
PIF_VALUE: 0
PIF_VALUE: 27
PIF_VALUE: 28
PIF_VALUE: 0
PIF_VALUE: 29
PIF_VALUE: 0
PIF_VALUE: 27
PIF_VALUE: 0
PIF_VALUE: 5
PIF_VALUE: 0
PIF_VALUE: 28
PIF_VALUE: 0

## 2020-05-09 ASSESSMENT — PAIN SCALES - GENERAL
PAINLEVEL_OUTOF10: 0
PAINLEVEL_OUTOF10: 6
PAINLEVEL_OUTOF10: 0

## 2020-05-09 ASSESSMENT — ENCOUNTER SYMPTOMS: SHORTNESS OF BREATH: 1

## 2020-05-09 NOTE — BRIEF OP NOTE
Brief Postoperative Note    Sandrine Holm  YOB: 1950  [id number]    Pre-operative Diagnosis and Procedure: 79 yo F COVID 19 positive with antral and proximal duodenal ulcers s/p EGD here for visceral angiogram and possible embolization. She has PERLA on CKD with a GFR of 9. Nephrology plans to send the patient for dialysis after the procedure. Post-operative Diagnosis: Same    Anesthesia: Local    Estimated Blood Loss: < 10 cc    Surgeon: Ander Chen MD    Complications: none    Specimen obtained: none    Findings: Successful arteriograms of the celiac, SMA, common hepatic, and GDA. No active extravasation was identified. Successful prophylactic embolization of the GDA.      Ander Chen MD   5/9/2020 11:30 AM

## 2020-05-09 NOTE — CONSULTS
[] Epinephrine    CENTRAL LINES:     [] No   [] Yes   (Date of Insertion:   )           If yes -     [x] Right IJ     [] Left IJ [] Right Femoral [] Left Femoral                   [] Right Subclavian [] Left Subclavian PICC line       WALTER'S CATHETER:   [] No   [x] Yes  (Date of Insertion:   )     URINE OUTPUT:            [] Good   [x] Low              [] Anuric      OBJECTIVE:     VITAL SIGNS:  BP (!) 132/46   Pulse 87   Temp 97.5 °F (36.4 °C) (Axillary)   Resp 29   Ht 5' 1\" (1.549 m)   Wt 232 lb (105.2 kg)   SpO2 97%   BMI 43.84 kg/m²   Tmax over 24 hours:  Temp (24hrs), Av.4 °F (36.3 °C), Min:96.7 °F (35.9 °C), Max:98.2 °F (36.8 °C)      Patient Vitals for the past 6 hrs:   BP Temp Temp src Pulse Resp SpO2   20 1122 (!) 132/46 -- -- 87 29 97 %   20 1110 (!) 109/39 97.5 °F (36.4 °C) Axillary 82 18 95 %   20 1100 (!) 109/39 -- -- 82 17 95 %   20 1055 (!) 118/42 97.5 °F (36.4 °C) Axillary 78 15 95 %   20 1000 (!) 112/55 97.5 °F (36.4 °C) Oral 65 15 95 %   20 0900 (!) 94/39 -- -- 66 13 96 %   20 0800 (!) 115/41 98 °F (36.7 °C) Oral 78 17 95 %   20 0700 (!) 103/42 -- -- 72 19 97 %         Intake/Output Summary (Last 24 hours) at 2020 1203  Last data filed at 2020 1110  Gross per 24 hour   Intake 4671 ml   Output 1811 ml   Net 2860 ml     Wt Readings from Last 2 Encounters:   20 232 lb (105.2 kg)   20 238 lb 3.2 oz (108 kg)     Body mass index is 43.84 kg/m².         PHYSICAL EXAMINATION:    General appearance - drowsy, obese, NAD  Mental status - alert, oriented to person, place, and time, drowsy  Eyes - pale  Ears - not examined  Nose - normal and patent, no erythema, discharge or polyps  Mouth - bit dry  Neck - supple, no significant adenopathy  Chest - decreased BS at bases  Heart - normal rate, regular rhythm, normal S1, S2, no murmurs, rubs, clicks or gallops  Abdomen - soft, nontender, nondistended, no masses or

## 2020-05-09 NOTE — PLAN OF CARE
Problem: Falls - Risk of:  Goal: Will remain free from falls  Description: Will remain free from falls  Outcome: Met This Shift  Goal: Absence of physical injury  Description: Absence of physical injury  Outcome: Met This Shift     Problem: Pain:  Goal: Pain level will decrease  Description: Pain level will decrease  Outcome: Ongoing  Goal: Control of acute pain  Description: Control of acute pain  Outcome: Ongoing  Goal: Control of chronic pain  Description: Control of chronic pain  Outcome: Ongoing     Problem: Skin Integrity:  Goal: Will show no infection signs and symptoms  Description: Will show no infection signs and symptoms  Outcome: Ongoing  Goal: Absence of new skin breakdown  Description: Absence of new skin breakdown  Outcome: Ongoing

## 2020-05-09 NOTE — PLAN OF CARE
Problem: Pain:  Goal: Pain level will decrease  Description: Pain level will decrease  Outcome: Met This Shift     Problem: Falls - Risk of:  Goal: Will remain free from falls  Description: Will remain free from falls  5/9/2020 0504 by Tavon Trejo RN  Outcome: Met This Shift  5/8/2020 2246 by Tavon Trejo RN  Outcome: Met This Shift     Problem: Skin Integrity:  Goal: Will show no infection signs and symptoms  Description: Will show no infection signs and symptoms  5/9/2020 0504 by Tavon Trejo RN  Outcome: Met This Shift  5/8/2020 2246 by Tavon Trejo RN  Outcome: Met This Shift  Goal: Absence of new skin breakdown  Description: Absence of new skin breakdown  Outcome: Met This Shift

## 2020-05-09 NOTE — PROGRESS NOTES
Hospitalist Progress Note      PCP: Tushar Garcia MD    Date of Admission: 5/3/2020    Hospital Course: 56-year-old female who presented with confusion. She had recently tested positive for COVID. She was also noted to have worsening sacral decubitus ulcer status post debridement on 5/3/2020. Subjective:  Patient began to have hematemesis yesterday. She became hypotensive and was transferred to the ICU. She was transfused 1 unit of blood. Patient had emergent endoscopy yesterday. Patient had angiography done today.     Medications:  Reviewed    Infusion Medications    pantoprozole (PROTONIX) infusion 8 mg/hr (05/09/20 1107)    dextrose      sodium chloride 50 mL/hr at 05/08/20 1327     Scheduled Medications    sodium chloride  20 mL Intravenous Once    sodium chloride  20 mL Intravenous Once    sodium chloride  20 mL Intravenous Once    sucralfate  1 g Oral 4 times per day    fluconazole  400 mg Oral Once    Followed by   Noam Alejandro fluconazole  200 mg Oral Daily    lidocaine PF  5 mL Intradermal Once    heparin flush  3 mL Intravenous 2 times per day    sodium chloride  25 mL Intravenous Q12H    insulin lispro  0-18 Units Subcutaneous Q4H    gabapentin  100 mg Oral TID    vancomycin (VANCOCIN) intermittent dosing (placeholder)   Other RX Placeholder    epoetin derek-epbx  3,000 Units Subcutaneous Once per day on Mon Wed Fri    amLODIPine  10 mg Oral Daily    calcitRIOL  0.25 mcg Oral Daily    calcium acetate  1,334 mg Oral TID WC    hydrALAZINE  50 mg Oral TID    sodium bicarbonate  1,300 mg Oral BID    vitamin C  500 mg Oral Daily    zinc  50 mg Oral Daily    [Held by provider] heparin (porcine)  5,000 Units Subcutaneous Q8H    piperacillin-tazobactam  3.375 g Intravenous Q12H     PRN Meds: trimethobenzamide, sodium chloride flush, heparin flush, HYDROmorphone, sodium chloride flush, glucose, dextrose, glucagon (rDNA), dextrose, oxyCODONE-acetaminophen **OR** oxyCODONE-acetaminophen, ALPRAZolam, polyethylene glycol, acetaminophen      Intake/Output Summary (Last 24 hours) at 5/9/2020 1658  Last data filed at 5/9/2020 1641  Gross per 24 hour   Intake 4871 ml   Output 1121 ml   Net 3750 ml       Physical Exam Performed:    BP (!) 135/48   Pulse 55 Comment: Simultaneous filing. User may not have seen previous data. Temp 97 °F (36.1 °C) (Temporal)   Resp 16 Comment: Simultaneous filing. User may not have seen previous data. Ht 5' 1\" (1.549 m)   Wt 232 lb (105.2 kg)   SpO2 100% Comment: Simultaneous filing. User may not have seen previous data. BMI 43.84 kg/m²   Due to the current efforts to prevent transmission of COVID-19 and also the need to preserve PPE for other caregivers, a face-to-face encounter with the patient was not performed. That being said, all relevant records and diagnostic tests were reviewed, including laboratory results and imaging. Please reference any relevant documentation elsewhere. Care will be coordinated with the primary service. General appearance: No apparent distress, appears stated age and cooperative. Obese female  HEENT: On mechanical ventilation  Neck: Right IJ central line   Respiratory:  Normal respiratory effort. Clear to auscultation, bilaterally without Rales/Wheezes/Rhonchi.   Cardiovascular: Regular rate and rhythm on cardiac monitor   abdomen: Nondistended  Musculoskeletal: No edema observed  Skin: Dressing and decubitus area  Neurologic: Sedated on vent        Labs:   Recent Labs     05/08/20  2210 05/09/20  0415 05/09/20  1021   WBC 19.5* 16.4* 15.6*   HGB 7.7* 6.9* 6.6*   HCT 24.5* 21.6* 20.6*    180 163     Recent Labs     05/07/20  0445 05/08/20  1110 05/09/20  0415 05/09/20  1602    143 144  --    K 5.4* 5.7* 5.4* 4.2   * 112* 112*  --    CO2 20* 17* 20*  --    BUN 74* 77* 83*  --    CREATININE 4.4* 4.3* 4.6*  --    CALCIUM 8.2* 7.7* 7.4*  --    PHOS  --   --  7.3*  --      Recent Labs     05/07/20  0445 05/08/20  1119 Heparin subcu  Diet: Diet NPO Effective Now Exceptions are: Sips with Meds  Code Status: Prior    PT/OT Eval Status: Ordered    Dispo -inpatient, critical    Davie Reid MD

## 2020-05-09 NOTE — PROGRESS NOTES
Pharmacy Consultation Note  (Antibiotic Dosing and Monitoring)    Initial consult date: 20  Consulting physician: Dr. Saul Martinez  Drug(s): Vancomycin   Indication: Sacral decubitus ulcer    Ht Readings from Last 1 Encounters:   20 5' 1\" (1.549 m)       Age/Gender IBW DW  Allergy Information   78 y.o.  female, 105.2 kg 47.3 kg 70.5 kg  Patient has no known allergies.                Date  WBC BUN/sCr UOP (mL/kg/hr) Drug/Dose Time   Given Level(s)   (Time) Comments   5/3  (#1) 21.2 68/4.3 --- Vancomycin 2000 mg IV x 1 1619       (#2) 17.1 70/4.4 --- No dose ---       (#3) 11 70/4.4 --- Vancomycin 1500 mg IV x 1 1632 Level = 9.6 @ 1230 Level originally scheduled to be drawn at 0600     (#4) 11.8 71/4.4 --- No dose --- Level = 30.9 mcg/mL @ 0545      (#5) 13.2 74/4.4 --- No dose --- Level = 20.5 mcg/mL @ 0445      (#6) 15.2 77/4.3 --- --- ---- 17.6 @ 1110      (#7) 16.4 83/4.6 0.3 Vancomycin 1250 mg IV X1 0056 32 @ 0415 (about 3.25 hours after dose)      Other Antibiotics/Antifungals/Antivirals Start Date Stop Date Comments   Zosyn 3.375 g IV q 12 hr                          Estimated CrCL: 14 mL/min      Intake/Output Summary (Last 24 hours) at 2020 5323  Last data filed at 2020 0600  Gross per 24 hour   Intake 4221 ml   Output 1585 ml   Net 2636 ml       Temp max: Temp (24hrs), Av.4 °F (36.3 °C), Min:96.7 °F (35.9 °C), Max:98.2 °F (36.8 °C)      Cultures:    Culture Date Result    Wound (sacral ulcer)  Proteus mirabilis, Staph aureus, Enterococcus avium   Urine  Streptococcus species   04509 CFU/mL   Blood #1 5/3 No growth   Blood #2 5/3 No growth   Anaerobic (sacral)  Anaerobic GNR   Fungus (Sacral)  Candida albicans       Assessment:  · Consulted by Dr. Ricardo Garduno to dose/monitor vancomycin  · Goal trough level:  15-20 mcg/mL  · Patient is a 77 yo/F with a PMH significant for DM, HTN, CKD, sarcoidosis, and recently tested positive for COVID-19 who presented to [facility] ([facility]-) on 5/3

## 2020-05-09 NOTE — PLAN OF CARE
Problem: Falls - Risk of:  Goal: Will remain free from falls  Description: Will remain free from falls  Outcome: Met This Shift     Problem: Skin Integrity:  Goal: Will show no infection signs and symptoms  Description: Will show no infection signs and symptoms  Outcome: Met This Shift

## 2020-05-09 NOTE — ANESTHESIA POSTPROCEDURE EVALUATION
Department of Anesthesiology  Postprocedure Note    Patient: Kamila Watts  MRN: [id number]  YOB: 1950  Date of evaluation: 5/9/2020  Time:  6:54 AM     Procedure Summary     Date:  05/08/20 Room / Location:  [facility] OR 12 / [facility]    Anesthesia Start:  Christo Brad Anesthesia Stop:  2045    Procedure:  EGD CONTROL HEMORRHAGE (N/A ) Diagnosis:  (.)    Surgeon:  Alex Patel MD Responsible Provider:  Erin Uribe DO    Anesthesia Type:  general ASA Status:  4          Anesthesia Type: general    Nuzhat Phase I:      Nuzhat Phase II:      Last vitals: Reviewed and per EMR flowsheets.        Anesthesia Post Evaluation    Patient location during evaluation: ICU  Patient participation: complete - patient participated  Level of consciousness: awake and alert  Airway patency: patent  Nausea & Vomiting: no nausea and no vomiting  Complications: no  Cardiovascular status: blood pressure returned to baseline  Respiratory status: acceptable  Hydration status: euvolemic

## 2020-05-09 NOTE — PROCEDURES
double lumen  Catheter size: 14 Fr  Pre-procedure: landmarks identified  Ultrasound guidance: yes  Sterile ultrasound techniques: sterile gel and sterile probe covers were used  Number of attempts: 2  Successful placement: yes  Post-procedure: line sutured and dressing applied  Assessment: blood return through all ports and free fluid flow  Patient tolerance: Patient tolerated the procedure well with no immediate complications          Marichuy Ba DO  5/9/2020    Stop by to see the patient with regards to the line. Currently she is running on dialysis at a pump speed of 250.   She otherwise is intubated and remains critical.    Heriberto Grover MD 5/10/2020

## 2020-05-09 NOTE — PROGRESS NOTES
zinc tablet 50 mg, 50 mg, Oral, Daily, Yuki Kirkwood, DO, 50 mg at 05/08/20 6373    [Held by provider] heparin (porcine) injection 5,000 Units, 5,000 Units, Subcutaneous, Q8H, Yuki Kirkwood, DO, 5,000 Units at 05/08/20 0705    piperacillin-tazobactam (ZOSYN) 3.375 g in dextrose 5 % 100 mL IVPB extended infusion (mini-bag), 3.375 g, Intravenous, Q12H, Yuki Kirkwood, DO, Stopped at 05/09/20 3058    Allergies:  Patient has no known allergies. Review of Systems:   Pertinent positives stated above in HPI. All other systems were reviewed and were negative. Physical exam:   Constitutional:  BP (!) 103/42   Pulse 72   Temp 97.3 °F (36.3 °C) (Axillary)   Resp 19   Ht 5' 1\" (1.549 m)   Wt 232 lb (105.2 kg)   SpO2 97%   BMI 43.84 kg/m²     Pt. not examined d/t COVID 19 restrictions. Pt. was discussed with nursing staff.      Data:     CBC with Differential:    Lab Results   Component Value Date    WBC 16.4 05/09/2020    RBC 2.32 05/09/2020    HGB 6.9 05/09/2020    HCT 21.6 05/09/2020     05/09/2020    MCV 93.1 05/09/2020    MCH 29.7 05/09/2020    MCHC 31.9 05/09/2020    RDW 15.4 05/09/2020    SEGSPCT 71 08/16/2013    BANDSPCT 1 03/29/2016    METASPCT 2.6 05/09/2020    LYMPHOPCT 3.5 05/09/2020    PROMYELOPCT 0.9 05/09/2020    MONOPCT 1.7 05/09/2020    MYELOPCT 1.7 05/09/2020    BASOPCT 0.2 05/09/2020    MONOSABS 0.33 05/09/2020    LYMPHSABS 0.66 05/09/2020    EOSABS 0.15 05/09/2020    BASOSABS 0.00 05/09/2020     BMP:    Lab Results   Component Value Date     05/09/2020    K 5.4 05/09/2020    K 5.3 05/03/2020     05/09/2020    CO2 20 05/09/2020    BUN 83 05/09/2020    LABALBU 1.5 05/09/2020    LABALBU 4.1 10/12/2011    CREATININE 4.6 05/09/2020    CALCIUM 7.4 05/09/2020    GFRAA 11 05/09/2020    LABGLOM 9 05/09/2020    GLUCOSE 169 05/09/2020    GLUCOSE 149 10/12/2011     Magnesium:    Lab Results   Component Value Date    MG 1.9 05/09/2020     Phosphorus:    Lab Results Component Value Date    PHOS 7.3 05/09/2020         Assessment/Plan  1. CKD 4/5  Baseline 4.2, eGFR 10-12 ml/min in 2/2020, 3.1 in 8/2019  Serum cr 4.3-> 4.4   Follow on IVF  4/11/20 JOSUÉ showed no hydronephrosis  Daily bmp  Worsening renal function with oliguria  Will initiate HD today     2. Sacral wound infection  S/p bedside sharp excisional debridement of sacral pressure ulcer on 05/03  Wound culture showing mixed gram-positive organisms, gram-negative rods, Proteus species. On piperacillin-tazobactam, vancomycin   ID following-for PICC placement and recommending diverting colostomy to allow healing time to scrum. 3. Sepsis  SARS-CoV-2 positive   On IVF, & IV antibiotics piperacillin-tazobactam, vancomycin   Blood, urine cultures pending  ID following    4. Anemia of CKD with superimposed acute blood loss due to GI bleed  EGD 5 8 due to an antral ulcer  PRBC transfusion: Hemoglobin is still low; for PRBC transfusion  For embolization in IR today    5. Secondary hyperparathyrodism of renal origin  Follow closely on vit d analogue due to sarcoid and h/o stones  Ca 8.2, PO4 4.9  PTH 2/1/20 was 336, 5/5   On rocaltrol, phoslo     6. Hyperkalemia with CKD  On admission 5.3, today 5.4. Will follow     7. Metabolic acidosis with CKD, sepsis  CO2 19 on admission, 20 today just below target of >22    6.  HTN in CKD Stages I-IV  Hypotensive and GI bleeding  Hold BP meds for now    Spoke with patient's son and obtain consent for dialysis    D/W Dr Skip Santiago MD       .

## 2020-05-09 NOTE — ANESTHESIA PRE PROCEDURE
(PHOSLO) capsule 1,334 mg  1,334 mg Oral TID WC Rozanne Like, DO   1,334 mg at 05/07/20 1610    hydrALAZINE (APRESOLINE) tablet 50 mg  50 mg Oral TID Rozanne Like, DO   50 mg at 05/08/20 2355    polyethylene glycol (GLYCOLAX) packet 17 g  17 g Oral Daily PRN Rozanne Like, DO        sodium bicarbonate tablet 1,300 mg  1,300 mg Oral BID Rozanne Like, DO   1,300 mg at 05/09/20 0948    acetaminophen (TYLENOL) tablet 650 mg  650 mg Oral Q4H PRN Rozanne Like, DO        0.9 % sodium chloride infusion   Intravenous Continuous Uriel Citrin, APRN - CNP 50 mL/hr at 05/08/20 1327      vitamin C (ASCORBIC ACID) tablet 500 mg  500 mg Oral Daily Rozanne Like, DO   500 mg at 05/09/20 0950    zinc tablet 50 mg  50 mg Oral Daily Rozanne Like, DO   50 mg at 05/09/20 0947    [Held by provider] heparin (porcine) injection 5,000 Units  5,000 Units Subcutaneous Q8H Rozanne Like, DO   5,000 Units at 05/08/20 0705    piperacillin-tazobactam (ZOSYN) 3.375 g in dextrose 5 % 100 mL IVPB extended infusion (mini-bag)  3.375 g Intravenous Q12H Rozanne Like, DO 25 mL/hr at 05/09/20 0952 3.375 g at 05/09/20 6897       Allergies:  No Known Allergies    Problem List:    Patient Active Problem List   Diagnosis Code    Neurologic gait dysfunction R26.9    Renal failure, acute on chronic (HCC) N17.9, N18.9    Diabetes mellitus ([facility] E11.9    Hypertension I10    Arthritis M19.90    Kidney disease N28.9    Knee problem M25.9    Anemia due to stage 3 chronic kidney disease (HCC) N18.3, D63.1    PERLA (acute kidney injury) ([facility] N17.9    Primary osteoarthritis of both knees M17.0    Acute on chronic renal insufficiency N28.9, N18.9    Acute renal failure (HCC) Q17.5    Metabolic acidosis G03.3    Acute renal failure superimposed on chronic kidney disease, on chronic dialysis (HCC) N17.9, N18.9, Z99.2    Sepsis ([facility] A41.9       Past Medical History:

## 2020-05-10 ENCOUNTER — APPOINTMENT (OUTPATIENT)
Dept: GENERAL RADIOLOGY | Age: 70
DRG: 853 | End: 2020-05-10
Payer: MEDICARE

## 2020-05-10 LAB
AADO2: 133.3 MMHG
ALBUMIN SERPL-MCNC: 1.7 G/DL (ref 3.5–5.2)
ALP BLD-CCNC: 450 U/L (ref 35–104)
ALT SERPL-CCNC: 33 U/L (ref 0–32)
ANION GAP SERPL CALCULATED.3IONS-SCNC: 12 MMOL/L (ref 7–16)
ANISOCYTOSIS: ABNORMAL
AST SERPL-CCNC: 44 U/L (ref 0–31)
B.E.: -3.2 MMOL/L (ref -3–3)
BASOPHILS ABSOLUTE: 0.19 E9/L (ref 0–0.2)
BASOPHILS RELATIVE PERCENT: 0.9 % (ref 0–2)
BILIRUB SERPL-MCNC: 0.3 MG/DL (ref 0–1.2)
BUN BLDV-MCNC: 54 MG/DL (ref 8–23)
BURR CELLS: ABNORMAL
CALCIUM SERPL-MCNC: 7.6 MG/DL (ref 8.6–10.2)
CHLORIDE BLD-SCNC: 106 MMOL/L (ref 98–107)
CO2: 21 MMOL/L (ref 22–29)
COHB: 0.3 % (ref 0–1.5)
CREAT SERPL-MCNC: 3.6 MG/DL (ref 0.5–1)
CRITICAL: ABNORMAL
DATE ANALYZED: ABNORMAL
DATE OF COLLECTION: ABNORMAL
EOSINOPHILS ABSOLUTE: 0.19 E9/L (ref 0.05–0.5)
EOSINOPHILS RELATIVE PERCENT: 0.9 % (ref 0–6)
FIO2: 40 %
GFR AFRICAN AMERICAN: 15
GFR NON-AFRICAN AMERICAN: 12 ML/MIN/1.73
GLUCOSE BLD-MCNC: 129 MG/DL (ref 74–99)
HCO3: 20.3 MMOL/L (ref 22–26)
HCT VFR BLD CALC: 29.1 % (ref 34–48)
HCT VFR BLD CALC: 31.5 % (ref 34–48)
HCT VFR BLD CALC: 31.6 % (ref 34–48)
HEMOGLOBIN: 10.1 G/DL (ref 11.5–15.5)
HEMOGLOBIN: 10.8 G/DL (ref 11.5–15.5)
HEMOGLOBIN: 10.9 G/DL (ref 11.5–15.5)
HHB: 2.5 % (ref 0–5)
LAB: ABNORMAL
LYMPHOCYTES ABSOLUTE: 0.41 E9/L (ref 1.5–4)
LYMPHOCYTES RELATIVE PERCENT: 1.7 % (ref 20–42)
Lab: ABNORMAL
MAGNESIUM: 1.8 MG/DL (ref 1.6–2.6)
MCH RBC QN AUTO: 30.3 PG (ref 26–35)
MCH RBC QN AUTO: 30.7 PG (ref 26–35)
MCH RBC QN AUTO: 30.9 PG (ref 26–35)
MCHC RBC AUTO-ENTMCNC: 34.2 % (ref 32–34.5)
MCHC RBC AUTO-ENTMCNC: 34.6 % (ref 32–34.5)
MCHC RBC AUTO-ENTMCNC: 34.7 % (ref 32–34.5)
MCV RBC AUTO: 88.4 FL (ref 80–99.9)
MCV RBC AUTO: 88.5 FL (ref 80–99.9)
MCV RBC AUTO: 89.2 FL (ref 80–99.9)
METAMYELOCYTES RELATIVE PERCENT: 0.9 % (ref 0–1)
METER GLUCOSE: 104 MG/DL (ref 74–99)
METER GLUCOSE: 124 MG/DL (ref 74–99)
METER GLUCOSE: 125 MG/DL (ref 74–99)
METER GLUCOSE: 144 MG/DL (ref 74–99)
METER GLUCOSE: 156 MG/DL (ref 74–99)
METER GLUCOSE: 158 MG/DL (ref 74–99)
METHB: 0.4 % (ref 0–1.5)
MODE: ABNORMAL
MONOCYTES ABSOLUTE: 0.41 E9/L (ref 0.1–0.95)
MONOCYTES RELATIVE PERCENT: 1.7 % (ref 2–12)
NEUTROPHILS ABSOLUTE: 19.67 E9/L (ref 1.8–7.3)
NEUTROPHILS RELATIVE PERCENT: 93.9 % (ref 43–80)
O2 CONTENT: 14.7 ML/DL
O2 SATURATION: 97.5 % (ref 92–98.5)
O2HB: 96.8 % (ref 94–97)
OPERATOR ID: 1868
PATIENT TEMP: 37 C
PCO2: 31 MMHG (ref 35–45)
PDW BLD-RTO: 15.4 FL (ref 11.5–15)
PDW BLD-RTO: 15.5 FL (ref 11.5–15)
PDW BLD-RTO: 15.7 FL (ref 11.5–15)
PEEP/CPAP: 5 CMH2O
PFO2: 2.66 MMHG/%
PH BLOOD GAS: 7.43 (ref 7.35–7.45)
PHOSPHORUS: 4 MG/DL (ref 2.5–4.5)
PLATELET # BLD: 111 E9/L (ref 130–450)
PLATELET # BLD: 114 E9/L (ref 130–450)
PLATELET # BLD: 77 E9/L (ref 130–450)
PLATELET CONFIRMATION: NORMAL
PMV BLD AUTO: 10.4 FL (ref 7–12)
PMV BLD AUTO: 10.5 FL (ref 7–12)
PMV BLD AUTO: 10.6 FL (ref 7–12)
PO2: 106.3 MMHG (ref 75–100)
POIKILOCYTES: ABNORMAL
POLYCHROMASIA: ABNORMAL
POTASSIUM SERPL-SCNC: 4.4 MMOL/L (ref 3.5–5)
PS: 5 CMH20
RBC # BLD: 3.29 E12/L (ref 3.5–5.5)
RBC # BLD: 3.53 E12/L (ref 3.5–5.5)
RBC # BLD: 3.57 E12/L (ref 3.5–5.5)
RI(T): 1.25
SODIUM BLD-SCNC: 139 MMOL/L (ref 132–146)
SOURCE, BLOOD GAS: ABNORMAL
THB: 10.7 G/DL (ref 11.5–16.5)
TIME ANALYZED: 1743
TOTAL PROTEIN: 4.6 G/DL (ref 6.4–8.3)
VANCOMYCIN RANDOM: 20.9 MCG/ML (ref 5–40)
WBC # BLD: 19.2 E9/L (ref 4.5–11.5)
WBC # BLD: 20.1 E9/L (ref 4.5–11.5)
WBC # BLD: 20.7 E9/L (ref 4.5–11.5)

## 2020-05-10 PROCEDURE — 85025 COMPLETE CBC W/AUTO DIFF WBC: CPT

## 2020-05-10 PROCEDURE — 6370000000 HC RX 637 (ALT 250 FOR IP): Performed by: STUDENT IN AN ORGANIZED HEALTH CARE EDUCATION/TRAINING PROGRAM

## 2020-05-10 PROCEDURE — 84100 ASSAY OF PHOSPHORUS: CPT

## 2020-05-10 PROCEDURE — 36415 COLL VENOUS BLD VENIPUNCTURE: CPT

## 2020-05-10 PROCEDURE — 71045 X-RAY EXAM CHEST 1 VIEW: CPT

## 2020-05-10 PROCEDURE — 94003 VENT MGMT INPAT SUBQ DAY: CPT

## 2020-05-10 PROCEDURE — 6360000002 HC RX W HCPCS: Performed by: STUDENT IN AN ORGANIZED HEALTH CARE EDUCATION/TRAINING PROGRAM

## 2020-05-10 PROCEDURE — 82805 BLOOD GASES W/O2 SATURATION: CPT

## 2020-05-10 PROCEDURE — 2580000003 HC RX 258: Performed by: STUDENT IN AN ORGANIZED HEALTH CARE EDUCATION/TRAINING PROGRAM

## 2020-05-10 PROCEDURE — 83735 ASSAY OF MAGNESIUM: CPT

## 2020-05-10 PROCEDURE — 6360000002 HC RX W HCPCS

## 2020-05-10 PROCEDURE — C9113 INJ PANTOPRAZOLE SODIUM, VIA: HCPCS | Performed by: STUDENT IN AN ORGANIZED HEALTH CARE EDUCATION/TRAINING PROGRAM

## 2020-05-10 PROCEDURE — 80202 ASSAY OF VANCOMYCIN: CPT

## 2020-05-10 PROCEDURE — 82962 GLUCOSE BLOOD TEST: CPT

## 2020-05-10 PROCEDURE — 2000000000 HC ICU R&B

## 2020-05-10 PROCEDURE — 90935 HEMODIALYSIS ONE EVALUATION: CPT

## 2020-05-10 PROCEDURE — 85027 COMPLETE CBC AUTOMATED: CPT

## 2020-05-10 PROCEDURE — 80053 COMPREHEN METABOLIC PANEL: CPT

## 2020-05-10 PROCEDURE — 2580000003 HC RX 258

## 2020-05-10 RX ADMIN — SODIUM CHLORIDE 8 MG/HR: 9 INJECTION, SOLUTION INTRAVENOUS at 07:30

## 2020-05-10 RX ADMIN — HYDRALAZINE HYDROCHLORIDE 50 MG: 25 TABLET, FILM COATED ORAL at 14:17

## 2020-05-10 RX ADMIN — CALCIUM ACETATE 1334 MG: 667 CAPSULE ORAL at 09:25

## 2020-05-10 RX ADMIN — CALCITRIOL 0.25 MCG: 0.25 CAPSULE ORAL at 09:24

## 2020-05-10 RX ADMIN — HYDROMORPHONE HYDROCHLORIDE 0.25 MG: 1 INJECTION, SOLUTION INTRAMUSCULAR; INTRAVENOUS; SUBCUTANEOUS at 05:03

## 2020-05-10 RX ADMIN — Medication 50 MG: at 09:24

## 2020-05-10 RX ADMIN — ALPRAZOLAM 0.25 MG: 0.25 TABLET ORAL at 20:38

## 2020-05-10 RX ADMIN — SODIUM CHLORIDE, PRESERVATIVE FREE 10 ML: 5 INJECTION INTRAVENOUS at 20:39

## 2020-05-10 RX ADMIN — FLUCONAZOLE 200 MG: 100 TABLET ORAL at 09:41

## 2020-05-10 RX ADMIN — HYDROMORPHONE HYDROCHLORIDE 0.25 MG: 1 INJECTION, SOLUTION INTRAMUSCULAR; INTRAVENOUS; SUBCUTANEOUS at 22:56

## 2020-05-10 RX ADMIN — GABAPENTIN 100 MG: 100 CAPSULE ORAL at 20:38

## 2020-05-10 RX ADMIN — SODIUM BICARBONATE 1300 MG: 650 TABLET ORAL at 09:25

## 2020-05-10 RX ADMIN — PIPERACILLIN AND TAZOBACTAM 3.38 G: 3; .375 INJECTION, POWDER, FOR SOLUTION INTRAVENOUS at 20:39

## 2020-05-10 RX ADMIN — SODIUM CHLORIDE 8 MG/HR: 9 INJECTION, SOLUTION INTRAVENOUS at 17:06

## 2020-05-10 RX ADMIN — HYDRALAZINE HYDROCHLORIDE 50 MG: 25 TABLET, FILM COATED ORAL at 20:38

## 2020-05-10 RX ADMIN — CALCIUM ACETATE 1334 MG: 667 CAPSULE ORAL at 17:22

## 2020-05-10 RX ADMIN — OXYCODONE HYDROCHLORIDE AND ACETAMINOPHEN 1 TABLET: 5; 325 TABLET ORAL at 09:39

## 2020-05-10 RX ADMIN — OXYCODONE HYDROCHLORIDE AND ACETAMINOPHEN 1 TABLET: 5; 325 TABLET ORAL at 14:18

## 2020-05-10 RX ADMIN — SODIUM BICARBONATE 1300 MG: 650 TABLET ORAL at 20:39

## 2020-05-10 RX ADMIN — INSULIN LISPRO 3 UNITS: 100 INJECTION, SOLUTION INTRAVENOUS; SUBCUTANEOUS at 10:02

## 2020-05-10 RX ADMIN — CALCIUM ACETATE 1334 MG: 667 CAPSULE ORAL at 14:17

## 2020-05-10 RX ADMIN — SUCRALFATE 1 G: 1 TABLET ORAL at 05:03

## 2020-05-10 RX ADMIN — SUCRALFATE 1 G: 1 TABLET ORAL at 14:17

## 2020-05-10 RX ADMIN — GABAPENTIN 100 MG: 100 CAPSULE ORAL at 14:17

## 2020-05-10 RX ADMIN — SUCRALFATE 1 G: 1 TABLET ORAL at 17:22

## 2020-05-10 RX ADMIN — AMLODIPINE BESYLATE 10 MG: 10 TABLET ORAL at 09:25

## 2020-05-10 RX ADMIN — Medication 500 MG: at 09:25

## 2020-05-10 RX ADMIN — GABAPENTIN 100 MG: 100 CAPSULE ORAL at 09:42

## 2020-05-10 RX ADMIN — SUCRALFATE 1 G: 1 TABLET ORAL at 00:25

## 2020-05-10 RX ADMIN — SODIUM CHLORIDE 8 MG/HR: 9 INJECTION, SOLUTION INTRAVENOUS at 01:12

## 2020-05-10 RX ADMIN — SODIUM CHLORIDE, PRESERVATIVE FREE 300 UNITS: 5 INJECTION INTRAVENOUS at 20:39

## 2020-05-10 RX ADMIN — VANCOMYCIN HYDROCHLORIDE 1250 MG: 10 INJECTION, POWDER, LYOPHILIZED, FOR SOLUTION INTRAVENOUS at 17:21

## 2020-05-10 RX ADMIN — SODIUM CHLORIDE, PRESERVATIVE FREE 300 UNITS: 5 INJECTION INTRAVENOUS at 09:44

## 2020-05-10 RX ADMIN — PIPERACILLIN AND TAZOBACTAM 3.38 G: 3; .375 INJECTION, POWDER, FOR SOLUTION INTRAVENOUS at 09:43

## 2020-05-10 ASSESSMENT — PULMONARY FUNCTION TESTS
PIF_VALUE: 29
PIF_VALUE: 40
PIF_VALUE: 10
PIF_VALUE: 11
PIF_VALUE: 26
PIF_VALUE: 29
PIF_VALUE: 29
PIF_VALUE: 16
PIF_VALUE: 28
PIF_VALUE: 10
PIF_VALUE: 39
PIF_VALUE: 28
PIF_VALUE: 29
PIF_VALUE: 28
PIF_VALUE: 30
PIF_VALUE: 15
PIF_VALUE: 39
PIF_VALUE: 28
PIF_VALUE: 22
PIF_VALUE: 30
PIF_VALUE: 39

## 2020-05-10 ASSESSMENT — PAIN SCALES - GENERAL
PAINLEVEL_OUTOF10: 0
PAINLEVEL_OUTOF10: 3
PAINLEVEL_OUTOF10: 7
PAINLEVEL_OUTOF10: 0
PAINLEVEL_OUTOF10: 6

## 2020-05-10 NOTE — PROGRESS NOTES
-- 99 -- -- --   05/10/20 1100 -- -- 95 -- -- --   05/10/20 1045 -- -- 99 -- -- --   05/10/20 1030 -- -- 93 -- -- --   05/10/20 1025 132/67 96 °F (35.6 °C) 97 -- 100 % 229 lb 11.5 oz (104.2 kg)   05/10/20 1000 -- -- 105 -- 100 % --   05/10/20 0900 -- -- 107 -- 100 % --   05/10/20 0800 -- 95.7 °F (35.4 °C) 100 -- 100 % --   05/10/20 0700 -- -- 110 -- 100 % --   05/10/20 0600 -- -- 96 20 100 % --         Intake/Output Summary (Last 24 hours) at 5/10/2020 1157  Last data filed at 5/10/2020 1000  Gross per 24 hour   Intake 925.83 ml   Output 1782 ml   Net -856.17 ml     Wt Readings from Last 2 Encounters:   05/10/20 229 lb 11.5 oz (104.2 kg)   04/11/20 238 lb 3.2 oz (108 kg)     Body mass index is 43.41 kg/m². PHYSICAL EXAMINATION:    General appearance - , obese, NAD  Mental status - alert,   Eyes - pale  Ears - not examined  Nose - normal and patent, no erythema, discharge or polyps  Mouth - ET +  Neck - supple, no significant adenopathy. R IJ cath  Chest - decreased BS at bases  Heart - normal rate, regular rhythm, normal S1, S2, no murmurs, rubs, clicks or gallops  Abdomen - soft, nontender, nondistended, no masses or organomegaly  Neurological - drowsy , no focalities}  Extremities - pedal edema ++ +  Skin - large decub ulcer.       Any additional physical findings:    MEDICATIONS:    Scheduled Meds:   sodium chloride  20 mL Intravenous Once    sodium chloride  20 mL Intravenous Once    sodium chloride  20 mL Intravenous Once    sodium chloride  20 mL Intravenous Once    sucralfate  1 g Oral 4 times per day    fluconazole  400 mg Oral Once    Followed by   Oneil fluconazole  200 mg Oral Daily    lidocaine PF  5 mL Intradermal Once    heparin flush  3 mL Intravenous 2 times per day    sodium chloride  25 mL Intravenous Q12H    insulin lispro  0-18 Units Subcutaneous Q4H    gabapentin  100 mg Oral TID    vancomycin (VANCOCIN) intermittent dosing (placeholder)   Other RX Placeholder    epoetin derek-epbx morbid obesity  ·       PLAN:     WEAN PER PROTOCOL:  [] No   [] Yes  [] N/A    DISCONTINUE ANY LABS:   [] No   [] Yes    ICU PROPHYLAXIS:  Stress ulcer:  [x] PPI Agent gtt [] [id number] [] Sucralfate  [] Other:  VTE:   [] Enoxaparin  [] Unfract. Heparin Subcut  [] EPC Cuffs    NUTRITION:  [x] NPO [] Tube Feeding (Specify: ) [] TPN  [] PO (Diet: Diet NPO Effective Now Exceptions are: Sips with Meds)    HOME MEDICATIONS RECONCILED: [] No  [] Yes    INSULIN DRIP:   [x] No   [] Yes    CONSULTATION NEEDED:  [] No   [] Yes    FAMILY UPDATED:    [x] No   [] Yes    TRANSFER OUT OF ICU:   [] No   [] Yes    ADDITIONAL PLAN:  1. S/p  IR GDA coiling. Continue PPI gtt  2. MVS support for airways protection. Wean fio2. Will try  wean to extubate after HD  3. 2nd HD today  4. Serial H/H to keep hb > 7.0, every 8 hrs. If stable in am, may change to daily  5. Keep BS < 180  6. SCD for prophylaxis. 7. If not able to extubate will start TF    CCt 30'  D/w Renal and MARSHA Miles                     5/10/2020, 11:57 AM      NOTE: This report, in part or full, may have been transcribed using voice recognition software. Every effort was made to ensure accuracy; however, inadvertent computerized transcription errors may be present. Please excuse any transcriptional grammatical or spelling errors that may have escaped my editorial review.

## 2020-05-10 NOTE — PROGRESS NOTES
chloride bolus, 20 mL, Intravenous, Once, Cris Vincent MD    0.9 % sodium chloride bolus, 20 mL, Intravenous, Once, Cris Vincent MD    dextrose 5 % and 0.9 % sodium chloride infusion, , Intravenous, Continuous, Live Gasca MD, Last Rate: 50 mL/hr at 05/09/20 1900    0.9 % sodium chloride bolus, 20 mL, Intravenous, Once, Live Gasca MD    0.9 % sodium chloride bolus, 20 mL, Intravenous, Once, Dev Ba MD    sucralfate (CARAFATE) tablet 1 g, 1 g, Oral, 4 times per day, Dev Ba MD, 1 g at 05/10/20 0503    fluconazole (DIFLUCAN) tablet 400 mg, 400 mg, Oral, Once **FOLLOWED BY** fluconazole (DIFLUCAN) tablet 200 mg, 200 mg, Oral, Daily, Dev Ba MD, 200 mg at 05/09/20 0950    pantoprazole (PROTONIX) 80 mg in sodium chloride 0.9 % 100 mL infusion, 8 mg/hr, Intravenous, Continuous, Dev Ba MD, Last Rate: 10 mL/hr at 05/10/20 0112, 8 mg/hr at 05/10/20 0112    trimethobenzamide (TIGAN) injection 200 mg, 200 mg, Intramuscular, Q6H PRN, Dev Ba MD    lidocaine PF 1 % injection 5 mL, 5 mL, Intradermal, Once, Dev Ba MD    sodium chloride flush 0.9 % injection 10 mL, 10 mL, Intravenous, PRN, Dev Ba MD    heparin flush 100 UNIT/ML injection 300 Units, 3 mL, Intravenous, 2 times per day, Dev Ba MD, 300 Units at 05/09/20 2109    heparin flush 100 UNIT/ML injection 300 Units, 3 mL, Intracatheter, PRN, Dev Ba MD    HYDROmorphone (DILAUDID) injection 0.25 mg, 0.25 mg, Intravenous, Q3H PRN, Dev Ba MD, 0.25 mg at 05/10/20 0503    0.9 % sodium chloride infusion admixture, 25 mL, Intravenous, Q12H, Dev Ba MD, Stopped at 05/10/20 0300    sodium chloride flush 0.9 % injection 10 mL, 10 mL, Intravenous, PRN, Dev Ba MD, 10 mL at 05/09/20 2109    insulin lispro (HUMALOG) injection vial 0-18 Units, 0-18 Units, Subcutaneous, Q4H, Dev Ba MD, 12 Units at 05/09/20 0951    glucose (GLUTOSE) 40 % oral

## 2020-05-10 NOTE — PROGRESS NOTES
input(s): INR in the last 72 hours. No results for input(s): Maria Guadalupe Ann in the last 72 hours. Urinalysis:      Lab Results   Component Value Date    NITRU Negative 05/04/2020    WBCUA 10-20 05/04/2020    WBCUA 5-10 08/17/2011    BACTERIA MANY 05/04/2020    RBCUA 0-1 05/04/2020    RBCUA 0-1 06/26/2013    BLOODU TRACE-INTACT 05/04/2020    SPECGRAV 1.025 05/04/2020    GLUCOSEU Negative 05/04/2020    GLUCOSEU NEGATIVE 08/17/2011       Radiology:  XR CHEST PORTABLE   Final Result   Interval placement of endotracheal tube and enteric tube. Otherwise no significant change from previous exam.                     XR Chest Abdomen Ng Placement   Final Result   NG tube in good position. XR CHEST PORTABLE   Final Result   PICC line tip is positioned in the right atrium. It is recommended   that this be withdrawn approximately 4 cm. Patchy airspace disease suggesting pneumonitis, left greater than   right. XR CHEST PORTABLE   Final Result   No interval change aside from satisfactory placement of right-sided   central line               XR CHEST PORTABLE   Final Result      Cardiomegaly which appears to be stable      No evidence of airspace consolidation or pulmonary venous congestion.          XR CHEST PORTABLE    (Results Pending)   XR ABDOMEN FOR NG/OG/NE TUBE PLACEMENT    (Results Pending)   IR EMBOLIZATION HEMORRHAGE    (Results Pending)   IR RADHA ART CATH ABD PELV LOWER EXT INIT 3RD+ ORDER    (Results Pending)   IR ART CATH PLACEMENT ABD/PEL/LOW EXT 1ST ORDER    (Results Pending)   IR ANGIOGRAM CELIAC    (Results Pending)   IR ANGIOGRAM SUPERIOR MESENTERIC    (Results Pending)   IR ANGIOGRAM SELECTIVE EACH ADDITIONAL VESSEL    (Results Pending)   IR ANGIOGRAM SELECTIVE EACH ADDITIONAL VESSEL    (Results Pending)   XR CHEST PORTABLE    (Results Pending)           Assessment/Plan:    Active Hospital Problems    Diagnosis    Sepsis ([facility] [A41.9]     #Upper GI bleed-due to D2 and antral ulcer  EGD performed on 5/8/2020. Epinephrine was applied to ulcers. H&H continued to decrease after procedure. Patient had successful arteriograms of celiac embolization of GDA by IR on 5/9/2019  -Monitor H&H  -Continue PPI drip  -Holding heparin  -As per Gen Surgery. Plan to begin tube feeds. #Acute on chronic anemia-due to acute blood loss anemia and advanced CKD. -Transfuse as needed. ARUN per nephrology. Received Epogen this admission. #Hypotension-due to intravascular volume loss from GI bleed. -BP improved. Patient was transfused PRBC. A-line in place. Continue IVF. #Infected sacral pressure decubitus ulcer that is post bedside sharp excisional debridement on  5/3/2020 and repeat excisional debridement of skin, subcutaneous tissue, muscle and fascia of sacrum and gluteal region on 5/4/2020  Enterococcus avium and Proteus mirabilis on cultures  -Appreciate surgery recommendations. Recommendations for outpatient referral to follow up with [facility] in 1-2 weeks after discharge. Wound care recommendations noted. -Appreciate ID recommendations. Continue Zosyn and vancomycin. Pharmacy is monitoring levels. PICC line to be placed to replace central line  -Recommendations for diverting colostomy from ID  -Continue pain control    #COVID-19 infection-no pneumonia on chest x-ray  -She has completed Plaquenil. Continue vitamin C and zinc.  -Continues to be afebrile and on room air  -Procalcitonin likely elevated due to infected decubitus ulcer    #Sepsis present on admission- due to COVID-19. Resolved. #Advanced chronic kidney disease-worsening renal function.  -As per nephrology. Patient approaching ESRD. HD catheter placed on 5/9. HD started 5/9. #Acute respiratory failure-intubated for airway protection  -Mechanical ventilation as per critical care    #Thrombocytopenia-continue to monitor.     #Acute metabolic and sepsis encephalopathy-?uremia or decreased clearance of medications. Resolved.   -PRN Ativan and Percocet with caution    #Diabetes-continue insulin coverage    #QT prolongation-avoiding medications that prolong interval.    DVT Prophylaxis: SCDs due to GI bleed  Diet: Diet NPO Effective Now Exceptions are: Sips with Meds  Code Status: Prior    PT/OT Eval Status: Ordered    Dispo -inpatient, critical    Joaquim Mario MD

## 2020-05-10 NOTE — PROGRESS NOTES
Patient had a positive cuff leak. Patient was extubated to a 4L NC. Patient had no stridor and was able to verbalize name well.  Will continue to monitor the patient

## 2020-05-11 LAB
ALBUMIN SERPL-MCNC: 1.5 G/DL (ref 3.5–5.2)
ALP BLD-CCNC: 307 U/L (ref 35–104)
ALT SERPL-CCNC: 25 U/L (ref 0–32)
ANION GAP SERPL CALCULATED.3IONS-SCNC: 14 MMOL/L (ref 7–16)
ANISOCYTOSIS: ABNORMAL
AST SERPL-CCNC: 27 U/L (ref 0–31)
BASOPHILIC STIPPLING: ABNORMAL
BASOPHILS ABSOLUTE: 0 E9/L (ref 0–0.2)
BASOPHILS RELATIVE PERCENT: 0.2 % (ref 0–2)
BILIRUB SERPL-MCNC: <0.2 MG/DL (ref 0–1.2)
BUN BLDV-MCNC: 41 MG/DL (ref 8–23)
CALCIUM SERPL-MCNC: 7.6 MG/DL (ref 8.6–10.2)
CHLORIDE BLD-SCNC: 106 MMOL/L (ref 98–107)
CO2: 23 MMOL/L (ref 22–29)
CREAT SERPL-MCNC: 3.2 MG/DL (ref 0.5–1)
EOSINOPHILS ABSOLUTE: 0 E9/L (ref 0.05–0.5)
EOSINOPHILS RELATIVE PERCENT: 0.9 % (ref 0–6)
GFR AFRICAN AMERICAN: 17
GFR NON-AFRICAN AMERICAN: 14 ML/MIN/1.73
GLUCOSE BLD-MCNC: 147 MG/DL (ref 74–99)
HAV IGM SER IA-ACNC: NORMAL
HBV SURFACE AB TITR SER: NORMAL {TITER}
HCT VFR BLD CALC: 25.8 % (ref 34–48)
HCT VFR BLD CALC: 26.4 % (ref 34–48)
HCT VFR BLD CALC: 26.8 % (ref 34–48)
HEMOGLOBIN: 8.7 G/DL (ref 11.5–15.5)
HEMOGLOBIN: 8.7 G/DL (ref 11.5–15.5)
HEMOGLOBIN: 9 G/DL (ref 11.5–15.5)
HEPATITIS B CORE IGM ANTIBODY: NORMAL
HEPATITIS B SURFACE ANTIGEN INTERPRETATION: NORMAL
HEPATITIS C ANTIBODY INTERPRETATION: NORMAL
LYMPHOCYTES ABSOLUTE: 0.49 E9/L (ref 1.5–4)
LYMPHOCYTES RELATIVE PERCENT: 2.6 % (ref 20–42)
MAGNESIUM: 1.7 MG/DL (ref 1.6–2.6)
MCH RBC QN AUTO: 30.3 PG (ref 26–35)
MCH RBC QN AUTO: 30.4 PG (ref 26–35)
MCH RBC QN AUTO: 30.5 PG (ref 26–35)
MCHC RBC AUTO-ENTMCNC: 33 % (ref 32–34.5)
MCHC RBC AUTO-ENTMCNC: 33.6 % (ref 32–34.5)
MCHC RBC AUTO-ENTMCNC: 33.7 % (ref 32–34.5)
MCV RBC AUTO: 90.2 FL (ref 80–99.9)
MCV RBC AUTO: 90.8 FL (ref 80–99.9)
MCV RBC AUTO: 92 FL (ref 80–99.9)
METER GLUCOSE: 116 MG/DL (ref 74–99)
METER GLUCOSE: 153 MG/DL (ref 74–99)
METER GLUCOSE: 156 MG/DL (ref 74–99)
METER GLUCOSE: 158 MG/DL (ref 74–99)
METER GLUCOSE: 178 MG/DL (ref 74–99)
METER GLUCOSE: 211 MG/DL (ref 74–99)
MONOCYTES ABSOLUTE: 0.65 E9/L (ref 0.1–0.95)
MONOCYTES RELATIVE PERCENT: 3.5 % (ref 2–12)
MYELOCYTE PERCENT: 1.7 % (ref 0–0)
NEUTROPHILS ABSOLUTE: 15.32 E9/L (ref 1.8–7.3)
NEUTROPHILS RELATIVE PERCENT: 92.2 % (ref 43–80)
OVALOCYTES: ABNORMAL
PDW BLD-RTO: 15.4 FL (ref 11.5–15)
PDW BLD-RTO: 15.6 FL (ref 11.5–15)
PDW BLD-RTO: 15.7 FL (ref 11.5–15)
PHOSPHORUS: 4.6 MG/DL (ref 2.5–4.5)
PLATELET # BLD: 77 E9/L (ref 130–450)
PLATELET # BLD: 81 E9/L (ref 130–450)
PLATELET # BLD: 82 E9/L (ref 130–450)
PLATELET CONFIRMATION: NORMAL
PMV BLD AUTO: 10.7 FL (ref 7–12)
PMV BLD AUTO: 11.1 FL (ref 7–12)
PMV BLD AUTO: 11.1 FL (ref 7–12)
POIKILOCYTES: ABNORMAL
POLYCHROMASIA: ABNORMAL
POTASSIUM SERPL-SCNC: 4.3 MMOL/L (ref 3.5–5)
RBC # BLD: 2.86 E12/L (ref 3.5–5.5)
RBC # BLD: 2.87 E12/L (ref 3.5–5.5)
RBC # BLD: 2.95 E12/L (ref 3.5–5.5)
SODIUM BLD-SCNC: 143 MMOL/L (ref 132–146)
TARGET CELLS: ABNORMAL
TOTAL PROTEIN: 4.4 G/DL (ref 6.4–8.3)
VANCOMYCIN RANDOM: 29.9 MCG/ML (ref 5–40)
WBC # BLD: 16.3 E9/L (ref 4.5–11.5)
WBC # BLD: 17 E9/L (ref 4.5–11.5)
WBC # BLD: 9 E9/L (ref 4.5–11.5)

## 2020-05-11 PROCEDURE — C9113 INJ PANTOPRAZOLE SODIUM, VIA: HCPCS | Performed by: STUDENT IN AN ORGANIZED HEALTH CARE EDUCATION/TRAINING PROGRAM

## 2020-05-11 PROCEDURE — 6360000002 HC RX W HCPCS: Performed by: STUDENT IN AN ORGANIZED HEALTH CARE EDUCATION/TRAINING PROGRAM

## 2020-05-11 PROCEDURE — 84100 ASSAY OF PHOSPHORUS: CPT

## 2020-05-11 PROCEDURE — 6370000000 HC RX 637 (ALT 250 FOR IP): Performed by: STUDENT IN AN ORGANIZED HEALTH CARE EDUCATION/TRAINING PROGRAM

## 2020-05-11 PROCEDURE — 85025 COMPLETE CBC W/AUTO DIFF WBC: CPT

## 2020-05-11 PROCEDURE — 82962 GLUCOSE BLOOD TEST: CPT

## 2020-05-11 PROCEDURE — 80202 ASSAY OF VANCOMYCIN: CPT

## 2020-05-11 PROCEDURE — 2580000003 HC RX 258: Performed by: STUDENT IN AN ORGANIZED HEALTH CARE EDUCATION/TRAINING PROGRAM

## 2020-05-11 PROCEDURE — 2580000003 HC RX 258: Performed by: INTERNAL MEDICINE

## 2020-05-11 PROCEDURE — 2000000000 HC ICU R&B

## 2020-05-11 PROCEDURE — 83735 ASSAY OF MAGNESIUM: CPT

## 2020-05-11 PROCEDURE — 2700000000 HC OXYGEN THERAPY PER DAY

## 2020-05-11 PROCEDURE — 90935 HEMODIALYSIS ONE EVALUATION: CPT | Performed by: INTERNAL MEDICINE

## 2020-05-11 PROCEDURE — 36415 COLL VENOUS BLD VENIPUNCTURE: CPT

## 2020-05-11 PROCEDURE — 85027 COMPLETE CBC AUTOMATED: CPT

## 2020-05-11 PROCEDURE — 80053 COMPREHEN METABOLIC PANEL: CPT

## 2020-05-11 RX ORDER — ANTICOAGULANT SODIUM CITRATE SOLUTION 4 G/100ML
2.8 SOLUTION INTRAVENOUS DAILY PRN
Status: DISCONTINUED | OUTPATIENT
Start: 2020-05-11 | End: 2020-05-13 | Stop reason: HOSPADM

## 2020-05-11 RX ORDER — PANTOPRAZOLE SODIUM 40 MG/10ML
40 INJECTION, POWDER, LYOPHILIZED, FOR SOLUTION INTRAVENOUS 2 TIMES DAILY
Status: DISCONTINUED | OUTPATIENT
Start: 2020-05-11 | End: 2020-05-13 | Stop reason: HOSPADM

## 2020-05-11 RX ORDER — SODIUM CHLORIDE 9 MG/ML
10 INJECTION INTRAVENOUS 2 TIMES DAILY
Status: DISCONTINUED | OUTPATIENT
Start: 2020-05-11 | End: 2020-05-13 | Stop reason: HOSPADM

## 2020-05-11 RX ADMIN — PANTOPRAZOLE SODIUM 40 MG: 40 INJECTION, POWDER, FOR SOLUTION INTRAVENOUS at 09:38

## 2020-05-11 RX ADMIN — SODIUM CHLORIDE 8 MG/HR: 9 INJECTION, SOLUTION INTRAVENOUS at 04:48

## 2020-05-11 RX ADMIN — SODIUM CHLORIDE, PRESERVATIVE FREE 10 ML: 5 INJECTION INTRAVENOUS at 21:55

## 2020-05-11 RX ADMIN — PIPERACILLIN AND TAZOBACTAM 3.38 G: 3; .375 INJECTION, POWDER, FOR SOLUTION INTRAVENOUS at 21:30

## 2020-05-11 RX ADMIN — CALCIUM ACETATE 1334 MG: 667 CAPSULE ORAL at 17:16

## 2020-05-11 RX ADMIN — SUCRALFATE 1 G: 1 TABLET ORAL at 17:16

## 2020-05-11 RX ADMIN — Medication 50 MG: at 09:37

## 2020-05-11 RX ADMIN — EPOETIN ALFA-EPBX 3000 UNITS: 3000 INJECTION, SOLUTION INTRAVENOUS; SUBCUTANEOUS at 09:37

## 2020-05-11 RX ADMIN — SUCRALFATE 1 G: 1 TABLET ORAL at 12:11

## 2020-05-11 RX ADMIN — GABAPENTIN 100 MG: 100 CAPSULE ORAL at 09:38

## 2020-05-11 RX ADMIN — AMLODIPINE BESYLATE 10 MG: 10 TABLET ORAL at 09:37

## 2020-05-11 RX ADMIN — FLUCONAZOLE 200 MG: 100 TABLET ORAL at 09:37

## 2020-05-11 RX ADMIN — HYDRALAZINE HYDROCHLORIDE 50 MG: 25 TABLET, FILM COATED ORAL at 21:50

## 2020-05-11 RX ADMIN — SUCRALFATE 1 G: 1 TABLET ORAL at 00:30

## 2020-05-11 RX ADMIN — Medication 500 MG: at 09:37

## 2020-05-11 RX ADMIN — INSULIN LISPRO 6 UNITS: 100 INJECTION, SOLUTION INTRAVENOUS; SUBCUTANEOUS at 17:23

## 2020-05-11 RX ADMIN — CALCITRIOL 0.25 MCG: 0.25 CAPSULE ORAL at 09:37

## 2020-05-11 RX ADMIN — SUCRALFATE 1 G: 1 TABLET ORAL at 04:49

## 2020-05-11 RX ADMIN — GABAPENTIN 100 MG: 100 CAPSULE ORAL at 12:50

## 2020-05-11 RX ADMIN — INSULIN LISPRO 3 UNITS: 100 INJECTION, SOLUTION INTRAVENOUS; SUBCUTANEOUS at 12:50

## 2020-05-11 RX ADMIN — PIPERACILLIN AND TAZOBACTAM 3.38 G: 3; .375 INJECTION, POWDER, FOR SOLUTION INTRAVENOUS at 09:38

## 2020-05-11 RX ADMIN — DEXTROSE AND SODIUM CHLORIDE: 5; 900 INJECTION, SOLUTION INTRAVENOUS at 04:48

## 2020-05-11 RX ADMIN — HYDRALAZINE HYDROCHLORIDE 50 MG: 25 TABLET, FILM COATED ORAL at 09:36

## 2020-05-11 RX ADMIN — SODIUM CHLORIDE, PRESERVATIVE FREE 10 ML: 5 INJECTION INTRAVENOUS at 09:39

## 2020-05-11 RX ADMIN — SODIUM BICARBONATE 1300 MG: 650 TABLET ORAL at 21:51

## 2020-05-11 RX ADMIN — CALCIUM ACETATE 1334 MG: 667 CAPSULE ORAL at 09:36

## 2020-05-11 RX ADMIN — CALCIUM ACETATE 1334 MG: 667 CAPSULE ORAL at 12:50

## 2020-05-11 RX ADMIN — PANTOPRAZOLE SODIUM 40 MG: 40 INJECTION, POWDER, FOR SOLUTION INTRAVENOUS at 21:55

## 2020-05-11 RX ADMIN — SODIUM BICARBONATE 1300 MG: 650 TABLET ORAL at 09:37

## 2020-05-11 RX ADMIN — GABAPENTIN 100 MG: 100 CAPSULE ORAL at 21:30

## 2020-05-11 ASSESSMENT — PULMONARY FUNCTION TESTS: PIF_VALUE: 16

## 2020-05-11 ASSESSMENT — PAIN SCALES - GENERAL
PAINLEVEL_OUTOF10: 0

## 2020-05-11 NOTE — PROGRESS NOTES
141/63   Pulse 82   Temp 97.8 °F (36.6 °C) (Temporal)   Resp 16   Ht 5' 1\" (1.549 m)   Wt 227 lb 11.8 oz (103.3 kg)   SpO2 97%   BMI 43.03 kg/m²     General appearance: No apparent distress, appears stated age and cooperative. HEENT: Pupils equal, round, and reactive to light. Conjunctivae/corneas clear. Neck: Supple, with full range of motion. No jugular venous distention. Trachea midline. Respiratory:  Normal respiratory effort. Clear to auscultation, bilaterally without Rales/Wheezes/Rhonchi. Cardiovascular: Regular rate and rhythm with normal S1/S2 without murmurs, rubs or gallops. Abdomen: Soft, non-tender, non-distended with normal bowel sounds. Musculoskeletal: No clubbing, cyanosis or edema bilaterally. Full range of motion without deformity. Skin: Skin color, texture, turgor normal.  No rashes or lesions. Labs:   Recent Labs     05/10/20  1735 05/11/20 0445 05/11/20 0952   WBC 19.2* 16.3* 17.0*   HGB 10.1* 8.7* 8.7*   HCT 29.1* 26.4* 25.8*   PLT 77* 77* 81*     Recent Labs     05/09/20  0415 05/09/20  1602 05/10/20  0345 05/11/20  0445     --  139 143   K 5.4* 4.2 4.4 4.3   *  --  106 106   CO2 20*  --  21* 23   BUN 83*  --  54* 41*   CREATININE 4.6*  --  3.6* 3.2*   CALCIUM 7.4*  --  7.6* 7.6*   PHOS 7.3*  --  4.0 4.6*     Recent Labs     05/09/20  0415 05/10/20  0345 05/11/20  0445   AST 81* 44* 27   ALT 43* 33* 25   BILITOT <0.2 0.3 <0.2   ALKPHOS 676* 450* 307*     No results for input(s): INR in the last 72 hours. No results for input(s): Mike Callahan in the last 72 hours.     Assessment/Plan:    Active Hospital Problems    Diagnosis Date Noted    [facility] [A41.9] 05/03/2020     p  As per critical care   Check for covid  DVT Prophylaxis:   Diet: DIET CLEAR LIQUID; Low Potassium  Dietary Nutrition Supplements: Clear Liquid Oral Supplement  Dietary Nutrition Supplements: Wound Healing Oral Supplement  Code Status: Prior    PT/OT Eval Status:consulted  Dispo -

## 2020-05-11 NOTE — PROGRESS NOTES
injection vial 0-18 Units, 0-18 Units, Subcutaneous, Q4H, Zunilda Luna MD, 3 Units at 05/11/20 1250    glucose (GLUTOSE) 40 % oral gel 15 g, 15 g, Oral, PRN, Zunilda Luna MD    dextrose 50 % IV solution, 12.5 g, Intravenous, PRN, Zunilda Luna MD, 12.5 g at 05/09/20 1550    glucagon (rDNA) injection 1 mg, 1 mg, Intramuscular, PRN, Zunilda Luna MD    dextrose 5 % solution, 100 mL/hr, Intravenous, PRN, Zunilda Luna MD    gabapentin (NEURONTIN) capsule 100 mg, 100 mg, Oral, TID, Zunilda Luna MD, 100 mg at 05/11/20 1250    vancomycin (VANCOCIN) intermittent dosing (placeholder), , Other, RX Placeholder, Zunilda Luna MD    epoetin derek-epbx (RETACRIT) injection 3,000 Units, 3,000 Units, Subcutaneous, Once per day on Mon Wed Fri, Zunilda Luna MD, 3,000 Units at 05/11/20 5030    oxyCODONE-acetaminophen (PERCOCET) 5-325 MG per tablet 1 tablet, 1 tablet, Oral, Q4H PRN, 1 tablet at 05/10/20 1418 **OR** oxyCODONE-acetaminophen (PERCOCET) 5-325 MG per tablet 2 tablet, 2 tablet, Oral, Q4H PRN, Zunilda Luna MD, 2 tablet at 05/08/20 0529    ALPRAZolam Rojean Jaguar) tablet 0.25 mg, 0.25 mg, Oral, Nightly PRN, Zunilda Luna MD, 0.25 mg at 05/10/20 2038    amLODIPine (NORVASC) tablet 10 mg, 10 mg, Oral, Daily, Zunilda Luna MD, 10 mg at 05/11/20 3016    calcitRIOL (ROCALTROL) capsule 0.25 mcg, 0.25 mcg, Oral, Daily, Zunilda Luna MD, 0.25 mcg at 05/11/20 8702    calcium acetate (PHOSLO) capsule 1,334 mg, 1,334 mg, Oral, TID WC, Zunilda Luna MD, 1,334 mg at 05/11/20 1250    hydrALAZINE (APRESOLINE) tablet 50 mg, 50 mg, Oral, TID, Zunilda Luna MD, 50 mg at 05/11/20 0936    polyethylene glycol (GLYCOLAX) packet 17 g, 17 g, Oral, Daily PRN, Zunilda Luna MD    sodium bicarbonate tablet 1,300 mg, 1,300 mg, Oral, BID, Zunilda Luna MD, 1,300 mg at 05/11/20 4062    acetaminophen (TYLENOL) tablet 650 mg, 650 mg, Oral, Q4H PRN, Zunilda Luna MD    [Held by provider] heparin (porcine) 5/10  Treatment planned for today     2. Sacral wound infection  S/p bedside sharp excisional debridement of sacral pressure ulcer on 05/03  Wound culture showing mixed gram-positive organisms, gram-negative rods, Proteus species. On piperacillin-tazobactam, vancomycin  And Diflucan  ID following- recommending diverting colostomy to allow healing time to scrum. 3. Sepsis  SARS-CoV-2 positive   Blood, urine cultures no growth  Sacral wounds source  ID following    4. Anemia of CKD with superimposed acute blood loss due to GI bleed  EGD 5 /8 due to GIB, showed antral ulcer  PRBC transfusion x3 units 5/  GDA embolization by IR 5/9; H/H stable post procedure       5. Secondary hyperparathyrodism of renal origin  Follow closely on vit d analogue due to sarcoid and h/o stones  On rocaltrol, phoslo     6. Hyperkalemia with CKD  Improved with dailysis  Will follow     7. Metabolic acidosis with CKD, sepsis  CO2 19 on admission, 20 today just below target of >22    6. HTN in CKD Stages I-IV  Hypotensive and GI bleeding  Hold BP meds for now      D/W Dr Adelina Pham MD     Department of Internal Medicine  Section of Nephrology  Dialysis Note        PROCEDURE:  Patient seen on hemodialysis    PHYSICIAN:  Alyce Osborne M.D., [facility]    INDICATION:  End-stage renal disease    RX:  See dialysis flowsheet for specifics on access, blood flow rate, dialysate baths, duration of dialysis, anticoagulation and other technical information.     COMMENTS:  Procedure in progress and tolerated       Alida Tyler MD        .

## 2020-05-11 NOTE — PROGRESS NOTES
TID    vancomycin (VANCOCIN) intermittent dosing (placeholder)   Other RX Placeholder    epoetin derek-epbx  3,000 Units Subcutaneous Once per day on Mon Wed Fri    amLODIPine  10 mg Oral Daily    calcitRIOL  0.25 mcg Oral Daily    calcium acetate  1,334 mg Oral TID WC    hydrALAZINE  50 mg Oral TID    sodium bicarbonate  1,300 mg Oral BID    vitamin C  500 mg Oral Daily    zinc  50 mg Oral Daily    [Held by provider] heparin (porcine)  5,000 Units Subcutaneous Q8H    piperacillin-tazobactam  3.375 g Intravenous Q12H     Continuous Infusions:   dextrose 5 % and 0.9 % NaCl 50 mL/hr at 05/11/20 0448    dextrose      sodium chloride 50 mL/hr at 05/08/20 1327     PRN Meds:   trimethobenzamide, 200 mg, Q6H PRN  sodium chloride flush, 10 mL, PRN  heparin flush, 3 mL, PRN  HYDROmorphone, 0.25 mg, Q3H PRN  sodium chloride flush, 10 mL, PRN  glucose, 15 g, PRN  dextrose, 12.5 g, PRN  glucagon (rDNA), 1 mg, PRN  dextrose, 100 mL/hr, PRN  oxyCODONE-acetaminophen, 1 tablet, Q4H PRN    Or  oxyCODONE-acetaminophen, 2 tablet, Q4H PRN  ALPRAZolam, 0.25 mg, Nightly PRN  polyethylene glycol, 17 g, Daily PRN  acetaminophen, 650 mg, Q4H PRN          VENT SETTINGS (Comprehensive) (if applicable):  Vent Information  $Ventilation: $Subsequent Day  Skin Assessment: Clean, dry, & intact  Equipment ID: [id number]  Vent Type: [phone]  Vent Mode: PS  Vt Ordered: 0 mL  Rate Set: 0 bmp  Peak Flow: 0 L/min  Pressure Support: 5 cmH20  FiO2 : 40 %  SpO2: 99 %  SpO2/FiO2 ratio: 247.5  Sensitivity: 1  PEEP/CPAP: 5  I Time/ I Time %: 0 s  Humidification Source: HME  Additional Respiratory  Assessments  Pulse: 82  Resp: 17  SpO2: 99 %  Position: Semi-Jordan's  Humidification Source: HME  Oral Care: Mouth swabbed, Mouth moisturizer, Mouth suctioned    ABGs:   Recent Labs     05/10/20  1743   PH 7.433   PCO2 31.0*   PO2 106.3*   HCO3 20.3*   BE -3.2*   O2SAT 97.5       Laboratory findings:    Complete Blood Count:   Recent Labs     05/10/20  1030 UPDATED:    [x] No   [] Yes    TRANSFER OUT OF ICU:   [] No   [] Yes    ADDITIONAL PLAN:  1. Status post GDA embolization by IR on 5/9/2020. H&H has dropped from 10.1 down to 8.7. Repeat H&H counts are stable. We will start a clear liquids and advance as tolerated to full liquid  2. Continue PPI twice daily per surgery  3. Wean FiO2 for saturations above 92%  4. Continue Zosyn and vancomycin renally dosed per ID recommendations. Continue Diflucan 200 mg every 24 hours  5. We will retest patient this week once COVID test negative x2 she will be ready for a diverting colostomy  6. Continue HD per nephrology        Tamika Mcdonnell M.D. [facility]                     5/11/2020, 9:14 AM  CCT excluding procedures:38'  NOTE: This report, in part or full, may have been transcribed using voice recognition software. Every effort was made to ensure accuracy; however, inadvertent computerized transcription errors may be present. Please excuse any transcriptional grammatical or spelling errors that may have escaped my editorial review.

## 2020-05-11 NOTE — PROGRESS NOTES
[facility] PROGRESS NOTE      Chief complaint: Follow-up of infected sacral pressure ulcer and COVID-19    The patient is a 78 y.o. female with history of DM, hypertension, CKD, sarcoidosis, presented on 05/03 with worsening pain, drainage, malodor from sacral pressure ulcer which had developed within the past 3 to 4 weeks and has been prescribed co-trimoxazole and cephalosporins since 04/29. She reports no cough, no shortness of breath, no diarrhea, no dysuria. On admission, she was afebrile and hemodynamically stable with leukocytosis of 21,000. SARS-CoV-2 PCR was detected. Urinalysis showed pyuria of 10-20 WBCs. Chest x-ray showed no infiltrates. She underwent bedside sharp excisional debridement of sacral pressure ulcer on 05/03 with wound culture showing mixed gram-positive organisms, gram-negative rods, Proteus species, moderate growth of MRSA (susceptible to doxycycline and cotrimoxazole). Clindamycin, hydroxychloroquine, piperacillin-tazobactam, vancomycin were started. She underwent excisional debridement of skin, subcutaneous tissue, muscle, and fascia of sacral and gluteal region on 05/04 during which no rectal communication was noted though the wound did extend caudal to the coccyx and just posterior to the rectum. Tissue Gram stain and culture showed few polymorphonuclear leukocytes, no epithelial cells, rare Gram-positive cocci in pairs, rare Gram-variable rods, moderate growth of Proteus mirabilis, rare growth of Enterococcus avium (susceotible to ampicillin), heavy growth of anaerobic beta-lactamase negative Gram-negative rods, light growth of Candida albicans. She had bloody diarrhea on 05/07 found to have antral and pyloric ulcers with overlying clot on EGD. She underwent gastroduodenal artery embolization on 05/09. She was extubated on 05/10. Subjective: Patient was seen and examined. No abdominal pain, no diarrhea, no rash, no itching.     Objective:  BP (!) 141/63   Pulse 92   Temp 98 °F

## 2020-05-11 NOTE — PROGRESS NOTES
Pharmacy Consultation Note  (Antibiotic Dosing and Monitoring)    Initial consult date: 20  Consulting physician: Dr. Mickey Granados  Drug(s): Vancomycin   Indication: Sacral decubitus ulcer    Ht Readings from Last 1 Encounters:   20 5' 1\" (1.549 m)       Age/Gender IBW DW  Allergy Information   78 y.o.  female, 105.2 kg 47.3 kg 70.5 kg  Patient has no known allergies.                Date  WBC BUN/sCr UOP (mL/kg/hr) Drug/Dose Time   Given Level(s)   (Time) Comments   5/3  (#1) 21.2 68/4.3 --- Vancomycin 2000 mg IV x 1 1619       (#2) 17.1 70/4.4 --- No dose ---       (#3) 11 70/4.4 --- Vancomycin 1500 mg IV x 1 1632 Level = 9.6 @ 1230 Level originally scheduled to be drawn at 0600     (#4) 11.8 71/4.4 --- No dose --- Level = 30.9 mcg/mL @ 0545      (#5) 13.2 74/4.4 --- No dose --- Level = 20.5 mcg/mL @ 0445      (#6) 15.2 77/4.3 --- --- ---- 17.6 @ 1110 Dose scheduled to be given at 1700, given late     (#7) 16.4 83/4.6    HD x 2.5 hrs 0.3 Vancomycin 1250 mg IV X1 0056 32 @ 0415 (about 3.25 hours after dose)    5/10  (#8) 20.1 54/3.6  HD x 3 hrs 0.3 Vancomycin 1250 mg IV X1 1721 20.9 @ 0345      (#9) 17 41/3.2  HD x 3.5 hrs --- --- --- Random = 29.9 mcg/mL @ 0445      (#10)      Random @ 0600      Other Antibiotics/Antifungals/Antivirals Start Date Stop Date Comments   Zosyn 3.375 g IV q 12 hr      Fluconazole 400 PO daily                    Estimated CrCL: 18 mL/min      Intake/Output Summary (Last 24 hours) at 2020 1258  Last data filed at 2020 0448  Gross per 24 hour   Intake 2086.5 ml   Output 1320 ml   Net 766.5 ml       Temp max: Temp (24hrs), Av.1 °F (36.2 °C), Min:96 °F (35.6 °C), Max:98 °F (36.7 °C)      Cultures:    Culture Date Result    Wound (sacral ulcer)  Proteus mirabilis, Staph aureus, Enterococcus avium   Urine  Streptococcus species   38540 CFU/mL   Blood #1 5/3 No growth   Blood #2 5/3 No growth   Anaerobic (sacral)  Anaerobic GNR

## 2020-05-12 PROBLEM — U07.1 2019 NOVEL CORONAVIRUS DISEASE (COVID-19): Status: ACTIVE | Noted: 2020-05-12

## 2020-05-12 LAB
ALBUMIN SERPL-MCNC: 1.7 G/DL (ref 3.5–5.2)
ALP BLD-CCNC: 259 U/L (ref 35–104)
ALT SERPL-CCNC: 22 U/L (ref 0–32)
ANION GAP SERPL CALCULATED.3IONS-SCNC: 7 MMOL/L (ref 7–16)
AST SERPL-CCNC: 24 U/L (ref 0–31)
BASOPHILS ABSOLUTE: 0.03 E9/L (ref 0–0.2)
BASOPHILS RELATIVE PERCENT: 0.2 % (ref 0–2)
BILIRUB SERPL-MCNC: 0.3 MG/DL (ref 0–1.2)
BUN BLDV-MCNC: 22 MG/DL (ref 8–23)
CALCIUM SERPL-MCNC: 7.8 MG/DL (ref 8.6–10.2)
CHLORIDE BLD-SCNC: 105 MMOL/L (ref 98–107)
CO2: 30 MMOL/L (ref 22–29)
CREAT SERPL-MCNC: 2.3 MG/DL (ref 0.5–1)
EOSINOPHILS ABSOLUTE: 0.16 E9/L (ref 0.05–0.5)
EOSINOPHILS RELATIVE PERCENT: 1 % (ref 0–6)
GFR AFRICAN AMERICAN: 25
GFR NON-AFRICAN AMERICAN: 21 ML/MIN/1.73
GLUCOSE BLD-MCNC: 103 MG/DL (ref 74–99)
HCT VFR BLD CALC: 25.3 % (ref 34–48)
HCT VFR BLD CALC: 25.4 % (ref 34–48)
HCT VFR BLD CALC: 25.7 % (ref 34–48)
HCT VFR BLD CALC: 26.3 % (ref 34–48)
HEMOGLOBIN: 8.4 G/DL (ref 11.5–15.5)
HEMOGLOBIN: 8.5 G/DL (ref 11.5–15.5)
HEMOGLOBIN: 8.6 G/DL (ref 11.5–15.5)
HEMOGLOBIN: 8.8 G/DL (ref 11.5–15.5)
IMMATURE GRANULOCYTES #: 0.43 E9/L
IMMATURE GRANULOCYTES %: 2.6 % (ref 0–5)
LYMPHOCYTES ABSOLUTE: 0.82 E9/L (ref 1.5–4)
LYMPHOCYTES RELATIVE PERCENT: 5 % (ref 20–42)
MAGNESIUM: 1.7 MG/DL (ref 1.6–2.6)
MCH RBC QN AUTO: 30.2 PG (ref 26–35)
MCH RBC QN AUTO: 30.5 PG (ref 26–35)
MCH RBC QN AUTO: 30.6 PG (ref 26–35)
MCH RBC QN AUTO: 30.9 PG (ref 26–35)
MCHC RBC AUTO-ENTMCNC: 33.1 % (ref 32–34.5)
MCHC RBC AUTO-ENTMCNC: 33.1 % (ref 32–34.5)
MCHC RBC AUTO-ENTMCNC: 33.5 % (ref 32–34.5)
MCHC RBC AUTO-ENTMCNC: 34 % (ref 32–34.5)
MCV RBC AUTO: 91 FL (ref 80–99.9)
MCV RBC AUTO: 91.3 FL (ref 80–99.9)
MCV RBC AUTO: 91.4 FL (ref 80–99.9)
MCV RBC AUTO: 92.1 FL (ref 80–99.9)
METER GLUCOSE: 101 MG/DL (ref 74–99)
METER GLUCOSE: 105 MG/DL (ref 74–99)
METER GLUCOSE: 110 MG/DL (ref 74–99)
METER GLUCOSE: 121 MG/DL (ref 74–99)
METER GLUCOSE: 239 MG/DL (ref 74–99)
MONOCYTES ABSOLUTE: 0.52 E9/L (ref 0.1–0.95)
MONOCYTES RELATIVE PERCENT: 3.2 % (ref 2–12)
NEUTROPHILS ABSOLUTE: 14.43 E9/L (ref 1.8–7.3)
NEUTROPHILS RELATIVE PERCENT: 88 % (ref 43–80)
PDW BLD-RTO: 15 FL (ref 11.5–15)
PDW BLD-RTO: 15.3 FL (ref 11.5–15)
PHOSPHORUS: 3 MG/DL (ref 2.5–4.5)
PLATELET # BLD: 78 E9/L (ref 130–450)
PLATELET # BLD: 86 E9/L (ref 130–450)
PLATELET # BLD: 87 E9/L (ref 130–450)
PLATELET # BLD: 90 E9/L (ref 130–450)
PLATELET CONFIRMATION: NORMAL
PMV BLD AUTO: 10.7 FL (ref 7–12)
PMV BLD AUTO: 10.9 FL (ref 7–12)
PMV BLD AUTO: 11.1 FL (ref 7–12)
PMV BLD AUTO: 11.6 FL (ref 7–12)
POTASSIUM SERPL-SCNC: 3.7 MMOL/L (ref 3.5–5)
RBC # BLD: 2.78 E12/L (ref 3.5–5.5)
RBC # BLD: 2.78 E12/L (ref 3.5–5.5)
RBC # BLD: 2.79 E12/L (ref 3.5–5.5)
RBC # BLD: 2.88 E12/L (ref 3.5–5.5)
SODIUM BLD-SCNC: 142 MMOL/L (ref 132–146)
TOTAL PROTEIN: 4.7 G/DL (ref 6.4–8.3)
VANCOMYCIN RANDOM: 23.4 MCG/ML (ref 5–40)
WBC # BLD: 14.6 E9/L (ref 4.5–11.5)
WBC # BLD: 15.8 E9/L (ref 4.5–11.5)
WBC # BLD: 16.3 E9/L (ref 4.5–11.5)
WBC # BLD: 16.4 E9/L (ref 4.5–11.5)

## 2020-05-12 PROCEDURE — 6370000000 HC RX 637 (ALT 250 FOR IP): Performed by: STUDENT IN AN ORGANIZED HEALTH CARE EDUCATION/TRAINING PROGRAM

## 2020-05-12 PROCEDURE — 6360000002 HC RX W HCPCS: Performed by: STUDENT IN AN ORGANIZED HEALTH CARE EDUCATION/TRAINING PROGRAM

## 2020-05-12 PROCEDURE — 97168 OT RE-EVAL EST PLAN CARE: CPT

## 2020-05-12 PROCEDURE — 36592 COLLECT BLOOD FROM PICC: CPT

## 2020-05-12 PROCEDURE — 83735 ASSAY OF MAGNESIUM: CPT

## 2020-05-12 PROCEDURE — 82962 GLUCOSE BLOOD TEST: CPT

## 2020-05-12 PROCEDURE — 36415 COLL VENOUS BLD VENIPUNCTURE: CPT

## 2020-05-12 PROCEDURE — 2700000000 HC OXYGEN THERAPY PER DAY

## 2020-05-12 PROCEDURE — 2500000003 HC RX 250 WO HCPCS: Performed by: INTERNAL MEDICINE

## 2020-05-12 PROCEDURE — 85027 COMPLETE CBC AUTOMATED: CPT

## 2020-05-12 PROCEDURE — 97164 PT RE-EVAL EST PLAN CARE: CPT

## 2020-05-12 PROCEDURE — 84100 ASSAY OF PHOSPHORUS: CPT

## 2020-05-12 PROCEDURE — 85025 COMPLETE CBC W/AUTO DIFF WBC: CPT

## 2020-05-12 PROCEDURE — 2580000003 HC RX 258: Performed by: STUDENT IN AN ORGANIZED HEALTH CARE EDUCATION/TRAINING PROGRAM

## 2020-05-12 PROCEDURE — C9113 INJ PANTOPRAZOLE SODIUM, VIA: HCPCS | Performed by: STUDENT IN AN ORGANIZED HEALTH CARE EDUCATION/TRAINING PROGRAM

## 2020-05-12 PROCEDURE — 80202 ASSAY OF VANCOMYCIN: CPT

## 2020-05-12 PROCEDURE — 2000000000 HC ICU R&B

## 2020-05-12 PROCEDURE — 97530 THERAPEUTIC ACTIVITIES: CPT

## 2020-05-12 PROCEDURE — 90935 HEMODIALYSIS ONE EVALUATION: CPT

## 2020-05-12 PROCEDURE — 80053 COMPREHEN METABOLIC PANEL: CPT

## 2020-05-12 PROCEDURE — 97535 SELF CARE MNGMENT TRAINING: CPT

## 2020-05-12 RX ORDER — FLUCONAZOLE 200 MG/1
200 TABLET ORAL DAILY
Qty: 7 TABLET | Refills: 0 | OUTPATIENT
Start: 2020-05-13 | End: 2020-05-20

## 2020-05-12 RX ORDER — FLUCONAZOLE 100 MG/1
200 TABLET ORAL DAILY
Qty: 66 TABLET | Refills: 0
Start: 2020-05-12 | End: 2020-06-14

## 2020-05-12 RX ORDER — HEPARIN SODIUM 10000 [USP'U]/ML
5000 INJECTION, SOLUTION INTRAVENOUS; SUBCUTANEOUS EVERY 8 HOURS
Qty: 40 ML | Refills: 1 | OUTPATIENT
Start: 2020-05-12

## 2020-05-12 RX ORDER — SUCRALFATE 1 G/1
1 TABLET ORAL 4 TIMES DAILY
Qty: 120 TABLET | Refills: 3 | OUTPATIENT
Start: 2020-05-12

## 2020-05-12 RX ORDER — PANTOPRAZOLE SODIUM 20 MG/1
20 TABLET, DELAYED RELEASE ORAL DAILY
Qty: 30 TABLET | Refills: 3 | OUTPATIENT
Start: 2020-05-12

## 2020-05-12 RX ORDER — GABAPENTIN 100 MG/1
100 CAPSULE ORAL 3 TIMES DAILY
Qty: 90 CAPSULE | Refills: 3 | OUTPATIENT
Start: 2020-05-12 | End: 2020-06-11

## 2020-05-12 RX ADMIN — SODIUM BICARBONATE 1300 MG: 650 TABLET ORAL at 08:19

## 2020-05-12 RX ADMIN — AMLODIPINE BESYLATE 10 MG: 10 TABLET ORAL at 08:13

## 2020-05-12 RX ADMIN — ANTICOAGULANT SODIUM CITRATE SOLUTION 0.11 G: 4 SOLUTION INTRAVENOUS at 18:18

## 2020-05-12 RX ADMIN — INSULIN LISPRO 6 UNITS: 100 INJECTION, SOLUTION INTRAVENOUS; SUBCUTANEOUS at 21:15

## 2020-05-12 RX ADMIN — SODIUM CHLORIDE, PRESERVATIVE FREE 10 ML: 5 INJECTION INTRAVENOUS at 08:19

## 2020-05-12 RX ADMIN — FLUCONAZOLE 200 MG: 100 TABLET ORAL at 08:46

## 2020-05-12 RX ADMIN — FLUCONAZOLE 400 MG: 100 TABLET ORAL at 08:16

## 2020-05-12 RX ADMIN — SUCRALFATE 1 G: 1 TABLET ORAL at 12:18

## 2020-05-12 RX ADMIN — GABAPENTIN 100 MG: 100 CAPSULE ORAL at 08:16

## 2020-05-12 RX ADMIN — SODIUM CHLORIDE, PRESERVATIVE FREE 10 ML: 5 INJECTION INTRAVENOUS at 20:28

## 2020-05-12 RX ADMIN — ALPRAZOLAM 0.25 MG: 0.25 TABLET ORAL at 20:27

## 2020-05-12 RX ADMIN — GABAPENTIN 100 MG: 100 CAPSULE ORAL at 20:27

## 2020-05-12 RX ADMIN — CALCITRIOL 0.25 MCG: 0.25 CAPSULE ORAL at 08:13

## 2020-05-12 RX ADMIN — PIPERACILLIN AND TAZOBACTAM 3.38 G: 3; .375 INJECTION, POWDER, FOR SOLUTION INTRAVENOUS at 20:30

## 2020-05-12 RX ADMIN — SUCRALFATE 1 G: 1 TABLET ORAL at 00:30

## 2020-05-12 RX ADMIN — SODIUM BICARBONATE 1300 MG: 650 TABLET ORAL at 20:27

## 2020-05-12 RX ADMIN — HYDRALAZINE HYDROCHLORIDE 50 MG: 25 TABLET, FILM COATED ORAL at 12:16

## 2020-05-12 RX ADMIN — HYDRALAZINE HYDROCHLORIDE 50 MG: 25 TABLET, FILM COATED ORAL at 08:43

## 2020-05-12 RX ADMIN — HYDRALAZINE HYDROCHLORIDE 50 MG: 25 TABLET, FILM COATED ORAL at 20:27

## 2020-05-12 RX ADMIN — GABAPENTIN 100 MG: 100 CAPSULE ORAL at 12:17

## 2020-05-12 RX ADMIN — CALCIUM ACETATE 1334 MG: 667 CAPSULE ORAL at 12:18

## 2020-05-12 RX ADMIN — SUCRALFATE 1 G: 1 TABLET ORAL at 05:07

## 2020-05-12 RX ADMIN — PANTOPRAZOLE SODIUM 40 MG: 40 INJECTION, POWDER, FOR SOLUTION INTRAVENOUS at 20:29

## 2020-05-12 RX ADMIN — CALCIUM ACETATE 1334 MG: 667 CAPSULE ORAL at 08:14

## 2020-05-12 RX ADMIN — PANTOPRAZOLE SODIUM 40 MG: 40 INJECTION, POWDER, FOR SOLUTION INTRAVENOUS at 08:18

## 2020-05-12 RX ADMIN — PIPERACILLIN AND TAZOBACTAM 3.38 G: 3; .375 INJECTION, POWDER, FOR SOLUTION INTRAVENOUS at 08:18

## 2020-05-12 ASSESSMENT — PAIN SCALES - GENERAL
PAINLEVEL_OUTOF10: 0

## 2020-05-12 NOTE — PROGRESS NOTES
Rolling:  Dependent   Supine to sit: NT  Sit to supine: NT   Rolling: Moderate Assist   Supine to sit: Maximal Assist   Sit to supine: Maximal Assist    Functional Transfers NT    Maximal Assist    Functional Mobility NT        Balance NT       Activity Tolerance P+     Light activity   F   Visual/  Perceptual Glasses: no  wfl                         Hand dominance: right       Strength ROM Additional Info:    RUE   Proximal 3-/5  Distal 3+/5 Shoulder limited; 80 degrees  elbow wfl fair  and fair- FMC/dexterity noted during ADL tasks  + mild tremors   LUE  Proximal 3-/5  Distal 3+/5 Shoulder limited; 80 degrees  elbow wfl Fair-  and fair- FMC/dexterity noted during ADL tasks  + mild tremors      Hearing: wfl  Sensation: Pt denies numbness and tingling  Tone: wfl  Edema: B LE edema noted                              Blood Pressure at rest 121/62 Blood Pressure post session 130/43   Heart Rate at rest 86 bpm Heart Rate post session 87 bpm   SPO2 at rest 98% on 2 L  SPO2 post session 97% on 2 L          Comments: Upon arrival, patient supine in bed and agreeable to OT evaluation with PT collaboration - nursing clearance obtained prior to session. At end of session, patient semi-supine in bed on L side with call light and phone within reach, all lines and tubes intact. Prior to and at the end of session, environmental modifications/line management completed for patients safety and efficiency of treatment session. Treatment:  OT services provided including Facilitation of bed mobility (rolling) and skilled repositioning in bed addressing joint and skin integrity (placed of pillows and adjusting for midline positioning) - skilled cuing on sequencing, hand placement, posture, body mechanics, energy conservation techniques and safety. Therapist facilitated B UE ther ex 2x10 in all planes (addressind temo and form).  Therapist facilitated self-care retraining: UB/LB self-care tasks (simulated gown, socks) and grooming task while educating pt on modified techniques, posture, safety and energy conservation techniques. Skilled monitoring of HR, O2 sats, BPand pts response to treatment. (Re-Evaluation time includes thorough review of current medical information, gathering information on past medical history/social history and prior level of function, completion of standardized testing/informal observation of tasks, assessment of data, and development of POC/Goals.)      Assessment of current deficits   Functional mobility [x]  ADLs [x] Strength [x]  Cognition []  Functional transfers  [x] IADLs [x] Safety Awareness [x]  Endurance [x]  Fine Motor Coordination [x] Balance [x] Vision/perception [] Sensation []   Gross Motor Coordination [] ROM [x] Delirium []                  Motor Control []    Plan of Care: OT for 5-7 days   ADL retraining [x]   Equipment needs [x]   Neuromuscular re-education [x] Energy Conservation Techniques [x]  Functional Transfer training [x] Patient and/or Family Education [x]  Functional Mobility training [x]  Environmental Modifications [x]  Cognitive re-training []   Compensatory techniques for ADLs [x]  Splinting Needs []   Positioning to improve overall function [x]   Therapeutic Activity [x]  Therapeutic Exercise  [x]  Visual/Perceptual: []    Delirium prevention/treatment  [x]   Other:  []     Rehab Potential: Good for established goals    LTG: maximize independence with ADLs     Patient / Family Goal:  Not stated     Patient and/or family were instructed diagnosis, prognosis/goals and plan of care. Demonstrated fair- understanding. [] Malnutrition indicators have been identified and nursing has been notified to ensure a dietitian consult is ordered.        AM-PAC Daily Activity Inpatient   How much help for putting on and taking off regular lower body clothing?: Total  How much help for Bathing?: A Lot  How much help for Toileting?: Total  How much help for putting on and taking off regular upper body clothing?: A Lot  How much help for taking care of personal grooming?: A Little  How much help for eating meals?: A Little  AM-[facility] Inpatient Daily Activity Raw Score: 12  AM-PAC Inpatient ADL T-Scale Score : 30.6  ADL Inpatient CMS 0-100% Score: 66.57  ADL Inpatient CMS G-Code Modifier : CL      Re- Evaluation +     Treatment Time In:927            Treatment Time Out: 950              Treatment Charges: Mins Units   Ther Ex  23036     Manual Therapy 63865     Thera Activities 41441 14 1   ADL/Home Mgt [id number] 9 1   Neuro Re-ed 53433     Group Therapy      Orthotic manage/training  17263     Non-Billable Time     Total Timed Treatment 23 2

## 2020-05-12 NOTE — PROGRESS NOTES
[facility] PROGRESS NOTE      Chief complaint: Follow-up of infected sacral pressure ulcer and COVID-19    The patient is a 78 y.o. female with history of DM, hypertension, CKD, sarcoidosis, presented on 05/03 with worsening pain, drainage, malodor from sacral pressure ulcer which had developed within the past 3 to 4 weeks and has been prescribed co-trimoxazole and cephalosporins since 04/29. She reports no cough, no shortness of breath, no diarrhea, no dysuria. On admission, she was afebrile and hemodynamically stable with leukocytosis of 21,000. SARS-CoV-2 PCR was detected. Urinalysis showed pyuria of 10-20 WBCs. Chest x-ray showed no infiltrates. She underwent bedside sharp excisional debridement of sacral pressure ulcer on 05/03 with wound culture showing mixed gram-positive organisms, gram-negative rods, Proteus species, moderate growth of MRSA (susceptible to doxycycline and cotrimoxazole). Clindamycin, hydroxychloroquine, piperacillin-tazobactam, vancomycin were started. She underwent excisional debridement of skin, subcutaneous tissue, muscle, and fascia of sacral and gluteal region on 05/04 during which no rectal communication was noted though the wound did extend caudal to the coccyx and just posterior to the rectum. Tissue Gram stain and culture showed few polymorphonuclear leukocytes, no epithelial cells, rare Gram-positive cocci in pairs, rare Gram-variable rods, moderate growth of Proteus mirabilis, rare growth of Enterococcus avium (susceotible to ampicillin), heavy growth of anaerobic beta-lactamase negative Gram-negative rods, light growth of Candida albicans. She had bloody diarrhea on 05/07 found to have antral and pyloric ulcers with overlying clot on EGD. She underwent gastroduodenal artery embolization on 05/09. She was extubated on 05/10. Subjective: Patient was seen and examined. No abdominal pain, no diarrhea, no rash, no itching.     Objective:  BP (!) 121/38   Pulse 62   Temp 97.6 °F (36.4 °C) (Temporal)   Resp 13   Ht 5' 1\" (1.549 m)   Wt 222 lb 12.8 oz (101.1 kg)   SpO2 100%   BMI 42.10 kg/m²   Constitutional: Alert, not in distress  Respiratory: Clear breath sounds, no crackles, no wheezes  Cardiovascular: Regular rate and rhythm, no murmurs  Gastrointestinal: Bowel sounds present, soft, nontender  Skin: Warm and dry, no active dermatoses  Musculoskeletal: No joint swelling, no joint erythema      Labs, imaging, and medical records/notes were personally reviewed. Assessment:  Sepsis, secondary to infected sacral pressure ulcer s/p bedside debridement on 05/03. Tissue culture grew Enterococcus avium, Proteus mirabilis, Candida albicans. SARS-CoV-2 infection  Upper GI bleeding s/p embolization on 05/09    Recommendations:  Continue piperacillin-tazobactam and vancomycin dosed according to trough per pharmacy. Anticipate 6 weeks of IV antibiotic therapy from 05/03-06/14. Continue fluconazole 200 mg q24h dosed according to renal function for 6 weeks from 05/03-06/14. Plan for possible diverting colostomy after repeat COVID-19 testing becomes negative is noted. Repeat COVID-19 PCR testing on 05/13. Plan was discussed with Dr. Edison Holley. Thank you for involving me in the care of [employer]. I will continue to follow. Please do not hesitate to call for any questions or concerns.     Electronically signed by Dereck Tse MD on 5/12/2020 at 12:53 PM

## 2020-05-12 NOTE — PROGRESS NOTES
Critical Care Team - Daily Progress Note         Date and time: 5/12/2020 9:33 AM  Patient's name:  [address] [place] [address] Record Number: [id number]  Patient's account/billing number: [de-identified]  Patient's YOB: 1950  Age: 78 y.o. Date of Admission: 5/3/2020  2:12 PM  Length of stay during current admission: 9      Primary Care Physician: Jann Arellano MD  ICU Attending Physician: Dr. Ian Meza Status: Prior    Reason for ICU admission: Hypotension, GI bleed      SUBJECTIVE:     OVERNIGHT EVENTS:      S/p IR, prophylactic coiling. S/p 3 PRBCs yesterday. No events O/N  Started on HD. Intubated yesterday to protect airways. 5/11: Patient was extubated last evening to 4 L nasal cannula. This morning she is awake alert oriented x3. No complaints of chest pain or shortness of breath. Only complains of discomfort and pain in her back. 5/12: No acute events overnight. Patient H&H is stable.     CURRENT VENTILATION STATUS:     [] Ventilator  [] BIPAP  [x] Nasal Cannula [] Room Air      IF INTUBATED, ET TUBE MARKING AT LOWER LIP:       cms    SECRETIONS Amount:  [] Small [] Moderate  [] Large  [] None  Color:     [] White [] Colored  [] Bloody    SEDATION:  RAAS Score:  [] Propofol gtt  [] Versed gtt  [] Ativan gtt   [x] No Sedation    PARALYZED:  [x] No    [] Yes      VASOPRESSORS:  [x] No    [] Yes    If yes -   [] Levophed       [] Dopamine     [] Vasopressin       [] Dobutamine  [] Phenylephrine         [] Epinephrine    CENTRAL LINES:     [x] Double-lumen right side PIC placed on 5/8/2020  [] Yes   (Date of Insertion:   )           If yes -     [] Right IJ     [] Left IJ [] Right Femoral [] Left Femoral                   [] Right Subclavian [] Left Subclavian PICC line       WALTER'S CATHETER:   [] No   [x] Yes  (Date of Insertion:   )     URINE OUTPUT:            [] Good   [x] Low              [] Anuric      OBJECTIVE:     VITAL SIGNS:  BP (!) 131/55   Pulse 80   Temp 97.6 °F (36.4 °C) (Temporal)   Resp (!) 6   Ht 5' 1\" (1.549 m)   Wt 222 lb 12.8 oz (101.1 kg)   SpO2 (!) 61%   BMI 42.10 kg/m²   Tmax over 24 hours:  Temp (24hrs), Av °F (36.1 °C), Min:96.4 °F (35.8 °C), Max:97.8 °F (36.6 °C)      Patient Vitals for the past 6 hrs:   BP Temp Temp src Pulse Resp SpO2 Weight   20 0800 (!) 131/55 -- -- 80 (!) 6 (!) 61 % --   20 0700 102/65 -- -- 79 14 99 % --   20 0600 (!) 124/57 97.6 °F (36.4 °C) Temporal 77 13 97 % 222 lb 12.8 oz (101.1 kg)   20 0500 102/64 -- -- 79 16 98 % --   20 0400 99/62 97.4 °F (36.3 °C) Temporal 66 13 98 % --   20 0337 -- -- -- -- 14 98 % --         Intake/Output Summary (Last 24 hours) at 2020 0933  Last data filed at 2020 0800  Gross per 24 hour   Intake 2462 ml   Output 3315 ml   Net -853 ml     Wt Readings from Last 2 Encounters:   20 222 lb 12.8 oz (101.1 kg)   20 238 lb 3.2 oz (108 kg)     Body mass index is 42.1 kg/m². PHYSICAL EXAMINATION:    General appearance -awake alert oriented x3, in no acute distress  HEENT: Atraumatic, normocephalic, pupils equal react light accommodation, sclera nonicteric  Neck - supple, no significant adenopathy. R IJ cath  Chest - decreased BS at bases  Heart -regular rate and rhythm, has a 3 out of 6 systolic murmur audible best at the left sternal border. Abdomen -obese, soft, nontender, nondistended, no masses or organomegaly  Neurological -cranial nerves II to XII grossly intact  Extremities - pedal edema +, positive peripheral pulses equal bilaterally  Skin - large decub ulcer.       Any additional physical findings:    MEDICATIONS:    Scheduled Meds:   pantoprazole  40 mg Intravenous BID    And    sodium chloride (PF)  10 mL Intravenous BID    sodium chloride  20 mL Intravenous Once    sodium chloride  20 mL Intravenous Once    sodium chloride  20 mL Intravenous Once    sodium chloride  20 mL Intravenous Once    sucralfate  1 g Oral 4 times per day    fluconazole  200 mg Oral Daily    lidocaine PF  5 mL Intradermal Once    [Held by provider] heparin flush  3 mL Intravenous 2 times per day    sodium chloride  25 mL Intravenous Q12H    insulin lispro  0-18 Units Subcutaneous Q4H    gabapentin  100 mg Oral TID    vancomycin (VANCOCIN) intermittent dosing (placeholder)   Other RX Placeholder    epoetin derek-epbx  3,000 Units Subcutaneous Once per day on Mon Wed Fri    amLODIPine  10 mg Oral Daily    calcitRIOL  0.25 mcg Oral Daily    calcium acetate  1,334 mg Oral TID WC    hydrALAZINE  50 mg Oral TID    sodium bicarbonate  1,300 mg Oral BID    [Held by provider] heparin (porcine)  5,000 Units Subcutaneous Q8H    piperacillin-tazobactam  3.375 g Intravenous Q12H     Continuous Infusions:   dextrose 5 % and 0.9 % NaCl 50 mL/hr at 05/11/20 0448    dextrose       PRN Meds:   anticoagulant sodium citrate, 2.8 mL, Daily PRN  trimethobenzamide, 200 mg, Q6H PRN  sodium chloride flush, 10 mL, PRN  heparin flush, 3 mL, PRN  HYDROmorphone, 0.25 mg, Q3H PRN  sodium chloride flush, 10 mL, PRN  glucose, 15 g, PRN  dextrose, 12.5 g, PRN  glucagon (rDNA), 1 mg, PRN  dextrose, 100 mL/hr, PRN  oxyCODONE-acetaminophen, 1 tablet, Q4H PRN    Or  oxyCODONE-acetaminophen, 2 tablet, Q4H PRN  ALPRAZolam, 0.25 mg, Nightly PRN  polyethylene glycol, 17 g, Daily PRN  acetaminophen, 650 mg, Q4H PRN          VENT SETTINGS (Comprehensive) (if applicable):  Vent Information  $Ventilation: $Subsequent Day  Skin Assessment: Clean, dry, & intact  Equipment ID: [id number]  Vent Type: [phone]  Vent Mode: PS  Vt Ordered: 420 mL  Rate Set: 10 bmp  Peak Flow: 0 L/min  Pressure Support: 10 cmH20  FiO2 : 40 %  SpO2: (!) 61 %  SpO2/FiO2 ratio: 247.5  Sensitivity: 1  PEEP/CPAP: 4  I Time/ I Time %: 0 s  Humidification Source: HME  Additional Respiratory  Assessments  Pulse: 80  Resp: (!) 6  SpO2: (!) 61 %  Position: Semi-Jordan's  Humidification Source: HME  Oral Care: Lip moisturizer applied, Mouth swabbed    ABGs:   Recent Labs     05/10/20  1743   PH 7.433   PCO2 31.0*   PO2 106.3*   HCO3 20.3*   BE -3.2*   O2SAT 97.5       Laboratory findings:    Complete Blood Count:   Recent Labs     05/11/20  1800 05/12/20  0255 05/12/20  0500   WBC 9.0 15.8* 16.4*   HGB 9.0* 8.4* 8.8*   HCT 26.8* 25.4* 26.3*   PLT 82* 78* 90*        Last 3 Blood Glucose:   Recent Labs     05/10/20  0345 05/11/20  0445 05/12/20  0500   GLUCOSE 129* 147* 103*        PT/INR:    Lab Results   Component Value Date    PROTIME 18.4 08/14/2019    PROTIME 15.3 06/25/2011    INR 1.6 08/14/2019     PTT:    Lab Results   Component Value Date    APTT 61.1 05/03/2019       Comprehensive Metabolic Profile:   Recent Labs     05/10/20  0345 05/11/20  0445 05/12/20  0500    143 142   K 4.4 4.3 3.7    106 105   CO2 21* 23 30*   BUN 54* 41* 22   CREATININE 3.6* 3.2* 2.3*   GLUCOSE 129* 147* 103*   CALCIUM 7.6* 7.6* 7.8*   PROT 4.6* 4.4* 4.7*   LABALBU 1.7* 1.5* 1.7*   BILITOT 0.3 <0.2 0.3   ALKPHOS 450* 307* 259*   AST 44* 27 24   ALT 33* 25 22      Magnesium:   Lab Results   Component Value Date    MG 1.7 05/12/2020     Phosphorus:   Lab Results   Component Value Date    PHOS 3.0 05/12/2020     Ionized Calcium: No results found for: CAION     Urinalysis:     Troponin: No results for input(s): TROPONINI in the last 72 hours.     Microbiology:    Cultures during this admission:     Blood cultures:                 [] None drawn      [] Negative             []  Positive (Details:  )  Urine Culture:                   [] None drawn      [] Negative             []  Positive (Details:  )  Sputum Culture:               [] None drawn       [] Negative             []  Positive (Details:  )   Endotracheal aspirate:     [] None drawn       [] Negative             []  Positive (Details:  )    wound +      One colony    Organism Abnormal  05/04/2020  6:17 PM [address] [facility]   Enterococcus avium    Culture Surgical 05/04/2020  6:17 PM  - [facility]   Rare growth    Testing Performed By     [address] Name Director Address Valid Date Range   49- - [address] [facility] Maryse Ledezma MD [address] [place] [address]. Buffy Sosa [address] 12/03/19 1502-Present   Narrative   Performed by: [address]   Source: TISSU       Site: SACRAL WOUND             Susceptibility     Proteus mirabilis (1)     Antibiotic Interpretation ERWIN Status    ampicillin Resistant >=^32 mcg/mL     ceFAZolin Resistant >=^64 mcg/mL     cefepime Sensitive <=^0.12 mcg/mL     cefTRIAXone Sensitive <=^0.25 mcg/mL     ertapenem Sensitive <=^0.12 mcg/mL     gentamicin Sensitive <=^1 mcg/mL     levofloxacin Sensitive <=^0.12 mcg/mL     piperacillin-tazobactam Sensitive <=^4 mcg/mL     trimethoprim-sulfamethoxazole Sensitive <=^20 mcg/mL     Enterococcus avium (2)     Antibiotic Interpretation ERWIN Status    ampicillin Sensitive <=^2 mcg/mL     doxycycline Intermediate ^8 mcg/mL     gentamicin-syn Sensitive SYN-S mcg/mL     vancomycin Sensitive <=^0.5 mcg/mL               Other pertinent Labs:   Last COVID test positive on 5/8/20    Radiology/Imaging:     Chest Xray (5/12/2020):    No chest x-ray today    ASSESSMENT:     Active Problems:  Acute hypoxic respiratory failure  COVID-19 infection  Upper GI bleed, antral and D2 ulcer status post GDA embolization on 5/9      Additional assessment:    · UGIB,  S/p EGD   · anemia  · Worsening CKD, poor U/O  · H/o DM  · H/o HTN  · H/o sarcoidosis? · H/o morbid obesity  ·       PLAN:     WEAN PER PROTOCOL:  [] No   [] Yes  [] N/A    DISCONTINUE ANY LABS:   [] No   [] Yes    ICU PROPHYLAXIS:  Stress ulcer:  [x] PPI Agent gtt [] W0Ynptr [] Sucralfate  [] Other:  VTE:   [] Enoxaparin  [] Unfract.  Heparin Subcut  [] EPC Cuffs    NUTRITION:  [x] NPO [] Tube Feeding (Specify: ) [] TPN  [] PO (Diet: DIET CLEAR LIQUID; Low Potassium  Dietary Nutrition Supplements: Clear

## 2020-05-12 NOTE — PROGRESS NOTES
Pharmacy Consultation Note  (Antibiotic Dosing and Monitoring)    Initial consult date: 20  Consulting physician: Dr. Zee Araujo  Drug(s): Vancomycin   Indication: Sacral decubitus ulcer    Ht Readings from Last 1 Encounters:   20 5' 1\" (1.549 m)       Age/Gender IBW DW  Allergy Information   78 y.o.  female, 105.2 kg 47.3 kg 70.5 kg  Patient has no known allergies.                Date  WBC BUN/sCr UOP (mL/kg/hr) Drug/Dose Time   Given Level(s)   (Time) Comments   5/3  (#1) 21.2 68/4.3 --- Vancomycin 2000 mg IV x 1 1619       (#2) 17.1 70/4.4 --- No dose ---       (#3) 11 70/4.4 --- Vancomycin 1500 mg IV x 1 1632 Level = 9.6 @ 1230 Level originally scheduled to be drawn at 0600     (#4) 11.8 71/4.4 --- No dose --- Level = 30.9 mcg/mL @ 0545      (#5) 13.2 74/4.4 --- No dose --- Level = 20.5 mcg/mL @ 0445      (#6) 15.2 77/4.3 --- --- ---- 17.6 @ 1110 Dose scheduled to be given at 1700, given late     (#7) 16.4 83/4.6    HD x 2.5 hrs 0.3 Vancomycin 1250 mg IV X1 0056 32 @ 0415 (about 3.25 hours after dose)    5/10  (#8) 20.1 54/3.6  HD x 3 hrs 0.3 Vancomycin 1250 mg IV X1 1721 20.9 @ 0345      (#9) 17 41/3.2  HD x 3.5 hrs --- --- --- Random = 29.9 mcg/mL @ 0445      (#10) 14.6 14.3/2.3  HD x 3.5 hrs   --- --- --- Random = 23.4 mcg/mL@ 0500      (#11)      Random level @ 0600      Other Antibiotics/Antifungals/Antivirals Start Date Stop Date Comments   Zosyn 3.375 g IV q 12 hr      Fluconazole 400 PO daily                    Estimated CrCL: 25 mL/min      Intake/Output Summary (Last 24 hours) at 2020 1109  Last data filed at 2020 0800  Gross per 24 hour   Intake 2462 ml   Output 3315 ml   Net -853 ml       Temp max: Temp (24hrs), Av °F (36.1 °C), Min:96.4 °F (35.8 °C), Max:97.8 °F (36.6 °C)      Cultures:    Culture Date Result    Wound (sacral ulcer)  Proteus mirabilis, Staph aureus, Enterococcus avium   Urine  Streptococcus species   51555 CFU/mL Blood #1 5/3 No growth   Blood #2 5/3 No growth   Anaerobic (sacral) 5/4 Anaerobic GNR   Fungus (Sacral) 5/4 Candida albicans       Assessment:  · Consulted by Dr. Isabel Engle to dose/monitor vancomycin  · Goal trough level:  15-20 mcg/mL  · Patient is a 77 yo/F with a PMH significant for DM, HTN, CKD, sarcoidosis, and recently tested positive for COVID-19 who presented to [facility] on 5/3 due to AMS. Admitted for sepsis evaluation, COVID-19, and sacral decubitus ulcer. Received vancomycin 2000 mg IV x 1 in ER. · sCr = 4.4 mg/dL (baseline varies from 3.3 - 5.6 mg/dL)  · 5/5: Random vancomycin level = 9.6 mcg/mL  · 5/6: Random vancomycin level = 30.9 mcg/mL  · 5/7: Random vancomycin level = 20.5 mcg/mL  · 5/8: Random level= 17.6  · 5/9: Dose scheduled for last night not given until 0056 today. Random level 32 ~3.25 hours after dose. HD was started last night. · 5/10: Random level= 20.9 mcg/mL; HD scheduled today  · 5/11: Random level = 29.9 mcg/mL; HD x 3.5 hr today. · 5/12: Random level = 23.4 mcg/mL. HD planned for today. Plan:  · No vancomycin today. · Will check vancomycin levels based on HD schedule. · Pharmacist will follow and monitor/adjust dosing as necessary. Thank you for this consult.     Danny Wilson, PharmD 5/12/2020 11:09 AM

## 2020-05-13 VITALS
RESPIRATION RATE: 15 BRPM | HEART RATE: 70 BPM | BODY MASS INDEX: 41.66 KG/M2 | DIASTOLIC BLOOD PRESSURE: 53 MMHG | SYSTOLIC BLOOD PRESSURE: 98 MMHG | TEMPERATURE: 96.8 F | WEIGHT: 220.68 LBS | HEIGHT: 61 IN | OXYGEN SATURATION: 100 %

## 2020-05-13 LAB
ALBUMIN SERPL-MCNC: 1.5 G/DL (ref 3.5–5.2)
ALP BLD-CCNC: 284 U/L (ref 35–104)
ALT SERPL-CCNC: 18 U/L (ref 0–32)
ANION GAP SERPL CALCULATED.3IONS-SCNC: 7 MMOL/L (ref 7–16)
AST SERPL-CCNC: 21 U/L (ref 0–31)
BASOPHILS ABSOLUTE: 0.01 E9/L (ref 0–0.2)
BASOPHILS RELATIVE PERCENT: 0.1 % (ref 0–2)
BILIRUB SERPL-MCNC: <0.2 MG/DL (ref 0–1.2)
BUN BLDV-MCNC: 13 MG/DL (ref 8–23)
CALCIUM SERPL-MCNC: 7.5 MG/DL (ref 8.6–10.2)
CHLORIDE BLD-SCNC: 105 MMOL/L (ref 98–107)
CO2: 31 MMOL/L (ref 22–29)
CREAT SERPL-MCNC: 1.8 MG/DL (ref 0.5–1)
EOSINOPHILS ABSOLUTE: 0.12 E9/L (ref 0.05–0.5)
EOSINOPHILS RELATIVE PERCENT: 0.9 % (ref 0–6)
GFR AFRICAN AMERICAN: 34
GFR NON-AFRICAN AMERICAN: 28 ML/MIN/1.73
GLUCOSE BLD-MCNC: 117 MG/DL (ref 74–99)
HCT VFR BLD CALC: 24.6 % (ref 34–48)
HEMOGLOBIN: 8 G/DL (ref 11.5–15.5)
IMMATURE GRANULOCYTES #: 0.25 E9/L
IMMATURE GRANULOCYTES %: 1.9 % (ref 0–5)
LYMPHOCYTES ABSOLUTE: 0.79 E9/L (ref 1.5–4)
LYMPHOCYTES RELATIVE PERCENT: 5.9 % (ref 20–42)
MAGNESIUM: 1.7 MG/DL (ref 1.6–2.6)
MCH RBC QN AUTO: 30.3 PG (ref 26–35)
MCHC RBC AUTO-ENTMCNC: 32.5 % (ref 32–34.5)
MCV RBC AUTO: 93.2 FL (ref 80–99.9)
METER GLUCOSE: 120 MG/DL (ref 74–99)
METER GLUCOSE: 132 MG/DL (ref 74–99)
METER GLUCOSE: 217 MG/DL (ref 74–99)
MONOCYTES ABSOLUTE: 0.61 E9/L (ref 0.1–0.95)
MONOCYTES RELATIVE PERCENT: 4.5 % (ref 2–12)
NEUTROPHILS ABSOLUTE: 11.68 E9/L (ref 1.8–7.3)
NEUTROPHILS RELATIVE PERCENT: 86.7 % (ref 43–80)
PDW BLD-RTO: 15.1 FL (ref 11.5–15)
PHOSPHORUS: 2.5 MG/DL (ref 2.5–4.5)
PLATELET # BLD: 87 E9/L (ref 130–450)
PLATELET CONFIRMATION: NORMAL
PMV BLD AUTO: 11.6 FL (ref 7–12)
POTASSIUM SERPL-SCNC: 3.5 MMOL/L (ref 3.5–5)
RBC # BLD: 2.64 E12/L (ref 3.5–5.5)
SODIUM BLD-SCNC: 143 MMOL/L (ref 132–146)
TOTAL PROTEIN: 4.5 G/DL (ref 6.4–8.3)
VANCOMYCIN RANDOM: 18.8 MCG/ML (ref 5–40)
WBC # BLD: 13.5 E9/L (ref 4.5–11.5)

## 2020-05-13 PROCEDURE — 80202 ASSAY OF VANCOMYCIN: CPT

## 2020-05-13 PROCEDURE — 2580000003 HC RX 258: Performed by: STUDENT IN AN ORGANIZED HEALTH CARE EDUCATION/TRAINING PROGRAM

## 2020-05-13 PROCEDURE — 6370000000 HC RX 637 (ALT 250 FOR IP): Performed by: STUDENT IN AN ORGANIZED HEALTH CARE EDUCATION/TRAINING PROGRAM

## 2020-05-13 PROCEDURE — 97530 THERAPEUTIC ACTIVITIES: CPT

## 2020-05-13 PROCEDURE — 36415 COLL VENOUS BLD VENIPUNCTURE: CPT

## 2020-05-13 PROCEDURE — 97535 SELF CARE MNGMENT TRAINING: CPT

## 2020-05-13 PROCEDURE — 80053 COMPREHEN METABOLIC PANEL: CPT

## 2020-05-13 PROCEDURE — 6360000002 HC RX W HCPCS: Performed by: STUDENT IN AN ORGANIZED HEALTH CARE EDUCATION/TRAINING PROGRAM

## 2020-05-13 PROCEDURE — C9113 INJ PANTOPRAZOLE SODIUM, VIA: HCPCS | Performed by: STUDENT IN AN ORGANIZED HEALTH CARE EDUCATION/TRAINING PROGRAM

## 2020-05-13 PROCEDURE — 2700000000 HC OXYGEN THERAPY PER DAY

## 2020-05-13 PROCEDURE — 84100 ASSAY OF PHOSPHORUS: CPT

## 2020-05-13 PROCEDURE — 83735 ASSAY OF MAGNESIUM: CPT

## 2020-05-13 PROCEDURE — 82962 GLUCOSE BLOOD TEST: CPT

## 2020-05-13 PROCEDURE — 85025 COMPLETE CBC W/AUTO DIFF WBC: CPT

## 2020-05-13 RX ADMIN — GABAPENTIN 100 MG: 100 CAPSULE ORAL at 08:45

## 2020-05-13 RX ADMIN — SODIUM BICARBONATE 1300 MG: 650 TABLET ORAL at 08:46

## 2020-05-13 RX ADMIN — SUCRALFATE 1 G: 1 TABLET ORAL at 05:26

## 2020-05-13 RX ADMIN — OXYCODONE HYDROCHLORIDE AND ACETAMINOPHEN 1 TABLET: 5; 325 TABLET ORAL at 09:27

## 2020-05-13 RX ADMIN — SUCRALFATE 1 G: 1 TABLET ORAL at 11:34

## 2020-05-13 RX ADMIN — CALCIUM ACETATE 1334 MG: 667 CAPSULE ORAL at 08:45

## 2020-05-13 RX ADMIN — FLUCONAZOLE 200 MG: 100 TABLET ORAL at 08:46

## 2020-05-13 RX ADMIN — SUCRALFATE 1 G: 1 TABLET ORAL at 00:24

## 2020-05-13 RX ADMIN — GABAPENTIN 100 MG: 100 CAPSULE ORAL at 13:14

## 2020-05-13 RX ADMIN — OXYCODONE HYDROCHLORIDE AND ACETAMINOPHEN 2 TABLET: 5; 325 TABLET ORAL at 05:25

## 2020-05-13 RX ADMIN — CALCITRIOL 0.25 MCG: 0.25 CAPSULE ORAL at 08:46

## 2020-05-13 RX ADMIN — SODIUM CHLORIDE, PRESERVATIVE FREE 10 ML: 5 INJECTION INTRAVENOUS at 08:45

## 2020-05-13 RX ADMIN — INSULIN LISPRO 6 UNITS: 100 INJECTION, SOLUTION INTRAVENOUS; SUBCUTANEOUS at 13:22

## 2020-05-13 RX ADMIN — PIPERACILLIN AND TAZOBACTAM 3.38 G: 3; .375 INJECTION, POWDER, FOR SOLUTION INTRAVENOUS at 08:46

## 2020-05-13 RX ADMIN — EPOETIN ALFA-EPBX 3000 UNITS: 3000 INJECTION, SOLUTION INTRAVENOUS; SUBCUTANEOUS at 08:46

## 2020-05-13 RX ADMIN — CALCIUM ACETATE 1334 MG: 667 CAPSULE ORAL at 13:15

## 2020-05-13 RX ADMIN — PANTOPRAZOLE SODIUM 40 MG: 40 INJECTION, POWDER, FOR SOLUTION INTRAVENOUS at 08:44

## 2020-05-13 ASSESSMENT — PAIN SCALES - GENERAL
PAINLEVEL_OUTOF10: 8
PAINLEVEL_OUTOF10: 1
PAINLEVEL_OUTOF10: 6
PAINLEVEL_OUTOF10: 0
PAINLEVEL_OUTOF10: 0

## 2020-05-13 ASSESSMENT — PAIN DESCRIPTION - LOCATION: LOCATION: GENERALIZED

## 2020-05-13 ASSESSMENT — PAIN DESCRIPTION - DESCRIPTORS: DESCRIPTORS: ACHING;DISCOMFORT

## 2020-05-13 NOTE — PROGRESS NOTES
Reason for Consult:  CKD4      History of Present Ilness: The pt is a 77 yo female with a pmh of djd, depression, dm, htm,sarcoidosis, nephrolithiasis is  followed by Dr Oren Lui for her CKD4. She presented to the ED for confusion and sacral wound check. Patient has a coccyx pressure wound x several weeks but has worsened recently. She has been taking bactrim and Keflex since 4/29/2020. She reports intermittent fevers at skilled nursing facility as well as intermittent confusion therefore she was referred to ED for evaluation to r/o sepsis secondary to wound. Her baseline cr from 2/2020 was 4.2., was 3.1 in 8/2019. Kaylee Blankenship was here in Feb. 2020 for a UTI and PERLA with dehydration. On this admission her serum cr peaked at 5.6 and she had b/l non-obstructing stones. She again was hospitalized here April 11-15 after a fall. With this admission her serum cr was 4.8 upon admission and down to 3.8 at discharge on 4/15. 5/3/20 Urinalysis showed pyuria of 10-20 WBCs. Chest x-ray showed no infiltrates. She underwent bedside sharp excisional debridement of sacral pressure ulcer on 05/03 with wound culture showing mixed gram-positive organisms, gram-negative rods, Proteus species. Clindamycin, hydroxychloroquine, piperacillin-tazobactam, vancomycin were started. Her VS on admission 5/3/20 were 129/60, 99, 24 T-97.5. Other pertinent admission labs include Na+ 136, K+ 5.3, CO2 19, BUN 68, Cr. 4.3, Ca++ 8.2, Hgb 7.1, albumin 1.8. COVID 19 is positive. Subjective    5/5/20: debridement of sacral wounds last pm; no new c/o  5/6/20: No new issues overnight. 5/7/20: per nursing, pt is having nausea this am-medicated with tigan. Per ID, plan is for diverting colostomy to allow sacral wound to heal, and PICC line placement for iv antibiotics.   5/8: Gi bleeding, surgery seeing; plan is for EGD   5/9: to  IR for embolization today  5/10; H/H stable post GDA embolization  5/11: Extubated yesterday; no SOB reported  5/12: no no acetaminophen (TYLENOL) tablet 650 mg, 650 mg, Oral, Q4H PRN, Dev Ba MD    [Held by provider] heparin (porcine) injection 5,000 Units, 5,000 Units, Subcutaneous, Q8H, Lars Gutierrez DO, 5,000 Units at 05/08/20 0705    piperacillin-tazobactam (ZOSYN) 3.375 g in dextrose 5 % 100 mL IVPB extended infusion (mini-bag), 3.375 g, Intravenous, Q12H, Dev Ba MD, Last Rate: 25 mL/hr at 05/13/20 0846, 3.375 g at 05/13/20 0846    Allergies:  Patient has no known allergies. Physical exam:   Constitutional:  BP (!) 106/42   Pulse 66   Temp 97 °F (36.1 °C)   Resp 16   Ht 5' 1\" (1.549 m)   Wt 220 lb 10.9 oz (100.1 kg)   SpO2 99%   BMI 41.70 kg/m²     Pt. not examined d/t COVID 19 restrictions. Pt. was discussed with  Intensivist     Data:     CBC with Differential:    Lab Results   Component Value Date    WBC 13.5 05/13/2020    RBC 2.64 05/13/2020    HGB 8.0 05/13/2020    HCT 24.6 05/13/2020    PLT 87 05/13/2020    MCV 93.2 05/13/2020    MCH 30.3 05/13/2020    MCHC 32.5 05/13/2020    RDW 15.1 05/13/2020    SEGSPCT 71 08/16/2013    BANDSPCT 1 03/29/2016    METASPCT 0.9 05/10/2020    LYMPHOPCT 5.9 05/13/2020    PROMYELOPCT 0.9 05/09/2020    MONOPCT 4.5 05/13/2020    MYELOPCT 1.7 05/11/2020    BASOPCT 0.1 05/13/2020    MONOSABS 0.61 05/13/2020    LYMPHSABS 0.79 05/13/2020    EOSABS 0.12 05/13/2020    BASOSABS 0.01 05/13/2020     BMP:    Lab Results   Component Value Date     05/13/2020    K 3.5 05/13/2020    K 5.3 05/03/2020     05/13/2020    CO2 31 05/13/2020    BUN 13 05/13/2020    LABALBU 1.5 05/13/2020    LABALBU 4.1 10/12/2011    CREATININE 1.8 05/13/2020    CALCIUM 7.5 05/13/2020    GFRAA 34 05/13/2020    LABGLOM 28 05/13/2020    GLUCOSE 117 05/13/2020    GLUCOSE 149 10/12/2011     Magnesium:    Lab Results   Component Value Date    MG 1.7 05/13/2020     Phosphorus:    Lab Results   Component Value Date    PHOS 2.5 05/13/2020         Assessment/Plan  1.  CKD 4/5  Baseline 4.2,

## 2020-05-13 NOTE — FLOWSHEET NOTE
05/09/20 2109   Vital Signs   /65   Temp 97 °F (36.1 °C)   Pulse 100   Resp 20   Weight 229 lb 11.5 oz (104.2 kg)   Weight Method Estimated   Percent Weight Change -0.95   Pain Assessment   Pain Assessment CPOT   Pain Level 0   Post-Hemodialysis Assessment   Post-Treatment Procedures Blood returned;Catheter capped, clamped and heparinized x 2 ports   Machine Disinfection Process Bleach;Acid/Vinegar Clean   Dialyzer Clearance Clear   Duration of Treatment (minutes) 150 minutes   Hemodialysis Output (ml) 1300 ml   NET Removed (ml) 1000 ml   Tolerated Treatment Good   Patient Response to Treatment toelrated well   Bilateral Breath Sounds Diminished   Edema Generalized
05/11/20 1915   Vital Signs   /64   Temp 96.6 °F (35.9 °C)   Pulse 73   Resp 18   Weight 224 lb 6.9 oz (101.8 kg)   Weight Method Actual;Bed scale   Percent Weight Change -1.45   Pain Assessment   Pain Assessment 0-10   Pain Level 0   Post-Hemodialysis Assessment   Post-Treatment Procedures Blood returned;Catheter capped, clamped with Citrate x 2 ports   Machine Disinfection Process Acid/Vinegar Clean;Heat Disinfect; Exterior Machine Disinfection   Rinseback Volume (ml) 400 ml   Total Liters Processed (l/min) 48.4 l/min   Dialyzer Clearance Lightly streaked   Duration of Treatment (minutes) 210 minutes   Heparin amount administered during treatment (units) 0 units   Hemodialysis Intake (ml) 400 ml   Hemodialysis Output (ml) 1900 ml   NET Removed (ml) 1500 ml   Tolerated Treatment Good   Bilateral Breath Sounds Diminished   Edema Generalized   Edema Generalized +1   Physician Notified?  No
Wound 05/03/20 Buttocks Right   Date First Assessed/Time First Assessed: 05/03/20 1930   Present on Hospital Admission: Yes  Location: Buttocks  Wound Location Orientation: Right   Wound Pressure Stage  3   Dressing/Treatment Dry dressing   Wound Length (cm) 2 cm   Wound Width (cm) 3 cm   Wound Depth (cm) 0.1 cm   Wound Surface Area (cm^2) 6 cm^2   Change in Wound Size % (l*w) -400   Wound Volume (cm^3) 0.6 cm^3   Wound Healing % -400   Wound Assessment Red;Yellow   Drainage Amount None     **Informed Consent**    The patient has given verbal consent to have photos taken of wounds and inserted into their chart as part of their permanent medical record for purposes of documentation, treatment management and/or medical review. All Images taken on 5/4/20 of patient name: Pam Edge were transmitted and stored on secured [employer] located within [facility] by a registered [employer] Mobile Application Device.    Impression:  Stage 4 sacral wound    Plan:  interdry to abdominal fold  dolphin bed surface  dakin's to sacral wound per surgery  Will need continued preventative care  Dietary consult        Debbie Ferguson 5/4/2020 4:11 PM
management and/or medical review. All Images taken on 5/13/20 of patient name: Ruth Phillips were transmitted and stored on secured K121 located within [facility] by a registered Epic-[employer] Mobile Application Device.      Impression:  Stage 4 pressure injury    Plan:  Orders reviewed and updated   Discharge today to select    Viet Hogue 5/13/2020 9:43 AM

## 2020-05-13 NOTE — PROGRESS NOTES
Critical Care Team - Daily Progress Note         Date and time: 5/13/2020 10:41 AM  Patient's name:  [address] [place] [address] Record Number: [id number]  Patient's account/billing number: [de-identified]  Patient's YOB: 1950  Age: 78 y.o. Date of Admission: 5/3/2020  2:12 PM  Length of stay during current admission: 10      Primary Care Physician: Fabian Avina MD  ICU Attending Physician: Dr. Donnise Paget Status: Prior    Reason for ICU admission: Hypotension, GI bleed      SUBJECTIVE:     OVERNIGHT EVENTS:      S/p IR, prophylactic coiling. S/p 3 PRBCs yesterday. No events O/N  Started on HD. Intubated yesterday to protect airways. 5/11: Patient was extubated last evening to 4 L nasal cannula. This morning she is awake alert oriented x3. No complaints of chest pain or shortness of breath. Only complains of discomfort and pain in her back. 5/12: No acute events overnight. Patient H&H is stable. 5/13: No acute events overnight. Patient is awake alert oriented and hemodynamically stable. No active bleeding.     CURRENT VENTILATION STATUS:     [] Ventilator  [] BIPAP  [x] Nasal Cannula [] Room Air      IF INTUBATED, ET TUBE MARKING AT LOWER LIP:       cms    SECRETIONS Amount:  [] Small [] Moderate  [] Large  [] None  Color:     [] White [] Colored  [] Bloody    SEDATION:  RAAS Score:  [] Propofol gtt  [] Versed gtt  [] Ativan gtt   [x] No Sedation    PARALYZED:  [x] No    [] Yes      VASOPRESSORS:  [x] No    [] Yes    If yes -   [] Levophed       [] Dopamine     [] Vasopressin       [] Dobutamine  [] Phenylephrine         [] Epinephrine    CENTRAL LINES:     [x] Double-lumen right side PIC placed on 5/8/2020  [] Yes   (Date of Insertion:   )           If yes -     [] Right IJ     [] Left IJ [] Right Femoral [] Left Femoral                   [] Right Subclavian [] Left Subclavian PICC line       WALTER'S CATHETER:   [] No   [x] Yes  (Date of Insertion:   )     URINE OUTPUT:            [] Good

## 2020-05-13 NOTE — DISCHARGE SUMMARY
Hospital Medicine Discharge Summary    Patient ID: Kenton Murry      Patient's PCP: Jacki Gomez MD    Admit Date: 5/3/2020     Discharge Date:   5/12/20    Admitting Physician: DO Lottie Orozco Physician: Guillermina Gonzalez DO     Discharge Diagnoses: Active Hospital Problems    Diagnosis Date Noted    2019 novel coronavirus disease (COVID-19) [U07.1] 05/12/2020    Sepsis ([address] [A41.9] 05/03/2020       The patient was seen and examined on day of discharge and this discharge summary is in conjunction with any daily progress note from day of discharge. Hospital Course: From h and p --  78 y.o. female who presented to [address] [place] [address] with confusion. The patient was sent in by her nursing home due to increasing confusion. However recently she also tested positive for COVID. She has a worsening pressure sore on her sacral area that is been there for several weeks. She is being evaluated for sepsis, and the fact that she is COVID positive. The patient was confused during the interview, she kept calling me Mamta Sampson, she did not know the present [place], but she did know the month and the year. While the patient was at [facility], she had her sacral wound debrided, and will at some point need a diverting colostomy. She also was treated for a COVID infection. At the time of discharge she was in stable condition. Exam:     BP (!) 121/38   Pulse 62   Temp 97.6 °F (36.4 °C) (Temporal)   Resp 13   Ht 5' 1\" (1.549 m)   Wt 222 lb 12.8 oz (101.1 kg)   SpO2 100%   BMI 42.10 kg/m²     General appearance: No apparent distress, appears stated age and cooperative. HEENT: Pupils equal, round, and reactive to light. Conjunctivae/corneas clear. Neck: Supple, with full range of motion. No jugular venous distention. Trachea midline. Respiratory:  Normal respiratory effort. Clear to auscultation, bilaterally without Rales/Wheezes/Rhonchi.   Cardiovascular: Regular rate
per protocol. Dose according to level on hemodialysis . Qty: 26969 mg, Refills: 0       fluconazole (DIFLUCAN) 100 MG tablet Take 2 tablets by mouth daily  Qty: 66 tablet, Refills: 0                      Details   acetaminophen (TYLENOL) 325 MG tablet Take 650 mg by mouth every 6 hours as needed for Pain       collagenase 250 UNIT/GM ointment Apply topically daily Apply topically daily. Cleanse coccyx with NSS and cover with foam boarder dressing; change daily       hydrOXYzine (VISTARIL) 25 MG capsule Take 25 mg by mouth every 6-8 hours as needed (agitation)       oxyCODONE-acetaminophen (PERCOCET) 5-325 MG per tablet Take 1 tablet by mouth 3 times daily as needed for Pain for up to 30 days. Qty: 90 tablet, Refills: 0     Comments: Reduce doses taken as pain becomes manageable  Associated Diagnoses: Chronic pain of both knees       sodium bicarbonate 650 MG tablet Take 2 tablets by mouth 2 times daily       ALPRAZolam (XANAX) 0.25 MG tablet Take 1 tablet by mouth nightly as needed for Sleep for up to 30 days. Qty: 30 tablet, Refills: 2     Associated Diagnoses: Primary insomnia       mineral oil-hydrophilic petrolatum (HYDROPHOR) ointment Apply topically as needed.   Refills: 0       polyethylene glycol (GLYCOLAX) packet Take 17 g by mouth daily as needed for Constipation  Qty: 527 g, Refills: 1       calcium acetate (PHOSLO) 667 MG capsule Take 2 capsules by mouth 3 times daily (with meals)  Qty: 90 capsule, Refills: 5       hydrALAZINE (APRESOLINE) 50 MG tablet Take 1 tablet by mouth 3 times daily  Qty: 90 tablet, Refills: 3       amLODIPine (NORVASC) 10 MG tablet Take 10 mg by mouth daily       calcitRIOL (ROCALTROL) 0.25 MCG capsule Take 1 capsule by mouth daily  Qty: 30 capsule, Refills: 3       gabapentin (NEURONTIN) 300 MG capsule Take 1 capsule by mouth 3 times daily                    Time Spent on discharge is more than 1 hour in the examination, evaluation, counseling and review of medications and

## 2020-05-13 NOTE — PROGRESS NOTES
Urine 5/4 Streptococcus species   43685 CFU/mL   Blood #1 5/3 No growth   Blood #2 5/3 No growth   Anaerobic (sacral) 5/4 Anaerobic GNR   Fungus (Sacral) 5/4 Candida albicans       Assessment:  · Consulted by Dr. Ricardo Garduno to dose/monitor vancomycin  · Goal trough level:  15-20 mcg/mL  · Patient is a 77 yo/F with a PMH significant for DM, HTN, CKD, sarcoidosis, and recently tested positive for COVID-19 who presented to [facility] on 5/3 due to AMS. Admitted for sepsis evaluation, COVID-19, and sacral decubitus ulcer. Received vancomycin 2000 mg IV x 1 in ER. · sCr = 4.4 mg/dL (baseline varies from 3.3 - 5.6 mg/dL)  · 5/5: Random vancomycin level = 9.6 mcg/mL  · 5/6: Random vancomycin level = 30.9 mcg/mL  · 5/7: Random vancomycin level = 20.5 mcg/mL  · 5/8: Random level= 17.6  · 5/9: Dose scheduled for last night not given until 0056 today. Random level 32 ~3.25 hours after dose. HD was started last night. · 5/10: Random level= 20.9 mcg/mL; HD scheduled today  · 5/11: Random level = 29.9 mcg/mL; HD x 3.5 hr today. · 5/12: Random level = 23.4 mcg/mL. HD planned for today. · 5/13: Random level = 18.8 mcg/mL. Plan:  · Vancomycin 1000 mg IV x 1.  · Will check vancomycin levels based on HD schedule. · Pharmacist will follow and monitor/adjust dosing as necessary. Thank you for this consult.     Chen Butler, PharmD 5/13/2020 2:07 PM

## 2020-05-13 NOTE — PROGRESS NOTES
AMANU[facility] PROGRESS NOTE      Chief complaint: Follow-up of infected sacral pressure ulcer and COVID-19    The patient is a 78 y.o. female with history of DM, hypertension, CKD, sarcoidosis, presented on 05/03 with worsening pain, drainage, malodor from sacral pressure ulcer which had developed within the past 3 to 4 weeks and has been prescribed co-trimoxazole and cephalosporins since 04/29. She reports no cough, no shortness of breath, no diarrhea, no dysuria. On admission, she was afebrile and hemodynamically stable with leukocytosis of 21,000. SARS-CoV-2 PCR was detected. Urinalysis showed pyuria of 10-20 WBCs. Chest x-ray showed no infiltrates. She underwent bedside sharp excisional debridement of sacral pressure ulcer on 05/03 with wound culture showing mixed gram-positive organisms, gram-negative rods, Proteus species, moderate growth of MRSA (susceptible to doxycycline and cotrimoxazole). Clindamycin, hydroxychloroquine, piperacillin-tazobactam, vancomycin were started. She underwent excisional debridement of skin, subcutaneous tissue, muscle, and fascia of sacral and gluteal region on 05/04 during which no rectal communication was noted though the wound did extend caudal to the coccyx and just posterior to the rectum. Tissue Gram stain and culture showed few polymorphonuclear leukocytes, no epithelial cells, rare Gram-positive cocci in pairs, rare Gram-variable rods, moderate growth of Proteus mirabilis, rare growth of Enterococcus avium (susceotible to ampicillin), heavy growth of anaerobic beta-lactamase negative Gram-negative rods, light growth of Candida albicans. She had bloody diarrhea on 05/07 found to have antral and pyloric ulcers with overlying clot on EGD. She underwent gastroduodenal artery embolization on 05/09. She was extubated on 05/10. Subjective: Patient was seen and examined. No abdominal pain, no diarrhea, no rash, no itching.     Objective:  BP (!) 101/54   Pulse 66   Temp 96.8

## 2020-05-13 NOTE — PROGRESS NOTES
Occupational Therapy  Treatment Note    Date:2020  Patient Name: Maximilian Mckinley  MRN: [id number]  : 1950  Room: [address] [place] [address]-A    Referring Provider:  Layne Marcelino MD     Re-evaluating OT: Shelley Magana OTR/L #[id number]     OT re-evaluation completed following transfer to ICU requiring intubation.      AM-PAC Daily Activity Raw Score:      Recommended Adaptive Equipment:  TBD      Diagnosis: Sepsis    Patient presented to ED for confusion and wound check from nursing facility;  COVID +     Procedure:   5/10/20 extubated  20 EGD with IR emobolization  20 Excisional debridement of skin, subcutaneous tissue, muscle and fascia of sacrum and gluteal region 23 x 25cm     Pertinent Medical History: arthritis, DM, HTN, Obesity, Sarcoidosis, Tremor, Sacral wound      Precautions:  Falls, Droplet plus ( COVID-19), O2, Sacral wound, TAPS, Castelan, FMS, Bed alarm     Home Living: Pt admitted from [facility] setup: handicap accessible   Equipment owned: w/c, w/w     Prior Level of Function: Reports non-ambulatory for 6 weeks. Prior to that she was a roger lift vs stand pivot with WW to w/c and ambulated short distances with therapy using [employer]. She does assist with ADLs. Driving: no  Occupation: na     Pain Level:  Pt reports some discomfort with sacral wound when repositioned     Cognition: A&O: 3/4; Follows 1-2 step directions. Anxious but cooperative. Memory:  fair              Sequencing:  fair              Problem solving:  F-              Judgement/safety:  F-                Functional Assessment:    RE-Eval Status  Date: 20 Treatment Status  Date: 20 Short Term Goals = Long Term Goals  Treatment frequency: 1-4x/wk; PRN   Feeding Moderate Assist      Simulated; + tremors  Min A Supervision     Grooming Moderate Assist      Semi-supine  Min A  EOB; to wash face and hands with bath cloth, to apply lotion to arms and hands. Impaired FM, assistance for container mgmt.  Stand techniques. Skilled monitoring of HR, O2 sats, BP and pts response to treatment. Patient has made F progress toward set goals, please continue with POC.     Treatment Time In: 0310           Treatment Time Out: 1050              Treatment Charges: Mins Units   Ther Ex  [id number]     Manual Therapy [id number]     Thera Activities 06756 25 2   ADL/Home Mgt 81209 30 2   Neuro Re-ed 50774     Group Therapy      Orthotic manage/training  [id number]     Non-Billable Time     Total Timed Treatment 55 Dyllan OTR/L  [id number]

## 2020-05-21 ENCOUNTER — HOSPITAL ENCOUNTER (INPATIENT)
Age: 70
LOS: 6 days | Discharge: ANOTHER HOSPITAL | DRG: 177 | End: 2020-05-27
Attending: FAMILY MEDICINE | Admitting: FAMILY MEDICINE
Payer: MEDICARE

## 2020-05-21 PROBLEM — K92.2 GI BLEED: Status: ACTIVE | Noted: 2020-05-21

## 2020-05-21 PROCEDURE — 85610 PROTHROMBIN TIME: CPT

## 2020-05-21 PROCEDURE — 83735 ASSAY OF MAGNESIUM: CPT

## 2020-05-21 PROCEDURE — 36415 COLL VENOUS BLD VENIPUNCTURE: CPT

## 2020-05-21 PROCEDURE — 99223 1ST HOSP IP/OBS HIGH 75: CPT | Performed by: INTERNAL MEDICINE

## 2020-05-21 PROCEDURE — 83520 IMMUNOASSAY QUANT NOS NONAB: CPT

## 2020-05-21 PROCEDURE — 83605 ASSAY OF LACTIC ACID: CPT

## 2020-05-21 PROCEDURE — 82728 ASSAY OF FERRITIN: CPT

## 2020-05-21 PROCEDURE — 85378 FIBRIN DEGRADE SEMIQUANT: CPT

## 2020-05-21 PROCEDURE — 80053 COMPREHEN METABOLIC PANEL: CPT

## 2020-05-21 PROCEDURE — 86140 C-REACTIVE PROTEIN: CPT

## 2020-05-21 PROCEDURE — 2000000000 HC ICU R&B

## 2020-05-21 PROCEDURE — 85025 COMPLETE CBC W/AUTO DIFF WBC: CPT

## 2020-05-21 PROCEDURE — 85384 FIBRINOGEN ACTIVITY: CPT

## 2020-05-21 PROCEDURE — 84100 ASSAY OF PHOSPHORUS: CPT

## 2020-05-21 PROCEDURE — 83615 LACTATE (LD) (LDH) ENZYME: CPT

## 2020-05-21 PROCEDURE — 84484 ASSAY OF TROPONIN QUANT: CPT

## 2020-05-21 PROCEDURE — 2580000003 HC RX 258: Performed by: INTERNAL MEDICINE

## 2020-05-21 RX ORDER — SODIUM CHLORIDE 0.9 % (FLUSH) 0.9 %
10 SYRINGE (ML) INJECTION PRN
Status: DISCONTINUED | OUTPATIENT
Start: 2020-05-21 | End: 2020-05-27 | Stop reason: HOSPADM

## 2020-05-21 RX ORDER — ACETAMINOPHEN 325 MG/1
650 TABLET ORAL EVERY 6 HOURS PRN
Status: DISCONTINUED | OUTPATIENT
Start: 2020-05-21 | End: 2020-05-21 | Stop reason: SDUPTHER

## 2020-05-21 RX ORDER — PANTOPRAZOLE SODIUM 40 MG/10ML
40 INJECTION, POWDER, LYOPHILIZED, FOR SOLUTION INTRAVENOUS 2 TIMES DAILY
Status: DISCONTINUED | OUTPATIENT
Start: 2020-05-21 | End: 2020-05-22

## 2020-05-21 RX ORDER — OXYCODONE HYDROCHLORIDE AND ACETAMINOPHEN 5; 325 MG/1; MG/1
1 TABLET ORAL EVERY 6 HOURS PRN
Status: DISCONTINUED | OUTPATIENT
Start: 2020-05-21 | End: 2020-05-27 | Stop reason: HOSPADM

## 2020-05-21 RX ORDER — HYDRALAZINE HYDROCHLORIDE 50 MG/1
50 TABLET, FILM COATED ORAL 3 TIMES DAILY
Status: DISCONTINUED | OUTPATIENT
Start: 2020-05-21 | End: 2020-05-21

## 2020-05-21 RX ORDER — CALCITRIOL 0.25 UG/1
0.25 CAPSULE, LIQUID FILLED ORAL DAILY
Status: DISCONTINUED | OUTPATIENT
Start: 2020-05-22 | End: 2020-05-27 | Stop reason: HOSPADM

## 2020-05-21 RX ORDER — ASCORBIC ACID 500 MG
500 TABLET ORAL DAILY
Status: DISCONTINUED | OUTPATIENT
Start: 2020-05-21 | End: 2020-05-27 | Stop reason: HOSPADM

## 2020-05-21 RX ORDER — SODIUM CHLORIDE 0.9 % (FLUSH) 0.9 %
10 SYRINGE (ML) INJECTION EVERY 12 HOURS SCHEDULED
Status: DISCONTINUED | OUTPATIENT
Start: 2020-05-21 | End: 2020-05-27 | Stop reason: HOSPADM

## 2020-05-21 RX ORDER — ONDANSETRON 2 MG/ML
4 INJECTION INTRAMUSCULAR; INTRAVENOUS EVERY 6 HOURS PRN
Status: DISCONTINUED | OUTPATIENT
Start: 2020-05-21 | End: 2020-05-22

## 2020-05-21 RX ORDER — ZINC SULFATE 50(220)MG
100 CAPSULE ORAL DAILY
Status: DISCONTINUED | OUTPATIENT
Start: 2020-05-22 | End: 2020-05-27 | Stop reason: HOSPADM

## 2020-05-21 RX ORDER — DEXTROSE MONOHYDRATE 50 MG/ML
100 INJECTION, SOLUTION INTRAVENOUS PRN
Status: DISCONTINUED | OUTPATIENT
Start: 2020-05-21 | End: 2020-05-27 | Stop reason: HOSPADM

## 2020-05-21 RX ORDER — POLYETHYLENE GLYCOL 3350 17 G/17G
17 POWDER, FOR SOLUTION ORAL DAILY PRN
Status: DISCONTINUED | OUTPATIENT
Start: 2020-05-21 | End: 2020-05-27 | Stop reason: HOSPADM

## 2020-05-21 RX ORDER — HYDROXYZINE PAMOATE 25 MG/1
25 CAPSULE ORAL 3 TIMES DAILY PRN
Status: DISCONTINUED | OUTPATIENT
Start: 2020-05-21 | End: 2020-05-27 | Stop reason: HOSPADM

## 2020-05-21 RX ORDER — NICOTINE POLACRILEX 4 MG
15 LOZENGE BUCCAL PRN
Status: DISCONTINUED | OUTPATIENT
Start: 2020-05-21 | End: 2020-05-27 | Stop reason: HOSPADM

## 2020-05-21 RX ORDER — AMLODIPINE BESYLATE 10 MG/1
10 TABLET ORAL DAILY
Status: DISCONTINUED | OUTPATIENT
Start: 2020-05-22 | End: 2020-05-21

## 2020-05-21 RX ORDER — FLUCONAZOLE 100 MG/1
200 TABLET ORAL DAILY
Status: DISCONTINUED | OUTPATIENT
Start: 2020-05-22 | End: 2020-05-27 | Stop reason: HOSPADM

## 2020-05-21 RX ORDER — DEXTROSE MONOHYDRATE 25 G/50ML
12.5 INJECTION, SOLUTION INTRAVENOUS PRN
Status: DISCONTINUED | OUTPATIENT
Start: 2020-05-21 | End: 2020-05-27 | Stop reason: HOSPADM

## 2020-05-21 RX ORDER — NYSTATIN 100000 U/G
CREAM TOPICAL 2 TIMES DAILY
Status: DISCONTINUED | OUTPATIENT
Start: 2020-05-21 | End: 2020-05-22

## 2020-05-21 RX ORDER — ACETAMINOPHEN 650 MG/1
650 SUPPOSITORY RECTAL EVERY 6 HOURS PRN
Status: DISCONTINUED | OUTPATIENT
Start: 2020-05-21 | End: 2020-05-21 | Stop reason: SDUPTHER

## 2020-05-21 RX ORDER — ACETAMINOPHEN 325 MG/1
650 TABLET ORAL EVERY 6 HOURS PRN
Status: DISCONTINUED | OUTPATIENT
Start: 2020-05-21 | End: 2020-05-27 | Stop reason: HOSPADM

## 2020-05-21 RX ORDER — PROMETHAZINE HYDROCHLORIDE 25 MG/1
12.5 TABLET ORAL EVERY 6 HOURS PRN
Status: DISCONTINUED | OUTPATIENT
Start: 2020-05-21 | End: 2020-05-22

## 2020-05-21 RX ORDER — SODIUM BICARBONATE 650 MG/1
1300 TABLET ORAL 2 TIMES DAILY
Status: DISCONTINUED | OUTPATIENT
Start: 2020-05-21 | End: 2020-05-21

## 2020-05-21 RX ORDER — ACETAMINOPHEN 650 MG/1
650 SUPPOSITORY RECTAL EVERY 6 HOURS PRN
Status: DISCONTINUED | OUTPATIENT
Start: 2020-05-21 | End: 2020-05-27 | Stop reason: HOSPADM

## 2020-05-21 RX ORDER — GABAPENTIN 300 MG/1
300 CAPSULE ORAL 3 TIMES DAILY
Status: DISCONTINUED | OUTPATIENT
Start: 2020-05-21 | End: 2020-05-22

## 2020-05-21 RX ADMIN — SODIUM CHLORIDE, PRESERVATIVE FREE 10 ML: 5 INJECTION INTRAVENOUS at 23:40

## 2020-05-21 ASSESSMENT — PAIN SCALES - GENERAL
PAINLEVEL_OUTOF10: 5
PAINLEVEL_OUTOF10: 3

## 2020-05-22 ENCOUNTER — ANESTHESIA EVENT (OUTPATIENT)
Dept: ENDOSCOPY | Age: 70
DRG: 177 | End: 2020-05-22
Payer: MEDICARE

## 2020-05-22 ENCOUNTER — APPOINTMENT (OUTPATIENT)
Dept: NUCLEAR MEDICINE | Age: 70
DRG: 177 | End: 2020-05-22
Attending: FAMILY MEDICINE
Payer: MEDICARE

## 2020-05-22 ENCOUNTER — ANESTHESIA (OUTPATIENT)
Dept: ENDOSCOPY | Age: 70
DRG: 177 | End: 2020-05-22
Payer: MEDICARE

## 2020-05-22 VITALS — SYSTOLIC BLOOD PRESSURE: 104 MMHG | OXYGEN SATURATION: 100 % | DIASTOLIC BLOOD PRESSURE: 56 MMHG

## 2020-05-22 PROBLEM — E43 SEVERE PROTEIN-CALORIE MALNUTRITION (HCC): Status: ACTIVE | Noted: 2020-05-22

## 2020-05-22 LAB
ABO/RH: NORMAL
ALBUMIN SERPL-MCNC: 1.5 G/DL (ref 3.5–5.2)
ALBUMIN SERPL-MCNC: 1.5 G/DL (ref 3.5–5.2)
ALP BLD-CCNC: 100 U/L (ref 35–104)
ALP BLD-CCNC: 119 U/L (ref 35–104)
ALT SERPL-CCNC: 36 U/L (ref 0–32)
ALT SERPL-CCNC: 37 U/L (ref 0–32)
ANGLE (CLOT STRENGTH): 77.7 DEGREE (ref 59–74)
ANION GAP SERPL CALCULATED.3IONS-SCNC: 10 MMOL/L (ref 7–16)
ANION GAP SERPL CALCULATED.3IONS-SCNC: 8 MMOL/L (ref 7–16)
ANISOCYTOSIS: ABNORMAL
ANTIBODY SCREEN: NORMAL
AST SERPL-CCNC: 58 U/L (ref 0–31)
AST SERPL-CCNC: 67 U/L (ref 0–31)
BASOPHILS ABSOLUTE: 0.04 E9/L (ref 0–0.2)
BASOPHILS ABSOLUTE: 0.49 E9/L (ref 0–0.2)
BASOPHILS RELATIVE PERCENT: 0.3 % (ref 0–2)
BASOPHILS RELATIVE PERCENT: 3.5 % (ref 0–2)
BILIRUB SERPL-MCNC: 0.3 MG/DL (ref 0–1.2)
BILIRUB SERPL-MCNC: 0.3 MG/DL (ref 0–1.2)
BLOOD BANK DISPENSE STATUS: NORMAL
BLOOD BANK DISPENSE STATUS: NORMAL
BLOOD BANK PRODUCT CODE: NORMAL
BLOOD BANK PRODUCT CODE: NORMAL
BPU ID: NORMAL
BPU ID: NORMAL
BUN BLDV-MCNC: 52 MG/DL (ref 8–23)
BUN BLDV-MCNC: 54 MG/DL (ref 8–23)
BURR CELLS: ABNORMAL
C-REACTIVE PROTEIN: 15.8 MG/DL (ref 0–0.4)
CALCIUM SERPL-MCNC: 7.4 MG/DL (ref 8.6–10.2)
CALCIUM SERPL-MCNC: 7.4 MG/DL (ref 8.6–10.2)
CHLORIDE BLD-SCNC: 107 MMOL/L (ref 98–107)
CHLORIDE BLD-SCNC: 107 MMOL/L (ref 98–107)
CO2: 25 MMOL/L (ref 22–29)
CO2: 26 MMOL/L (ref 22–29)
CREAT SERPL-MCNC: 3 MG/DL (ref 0.5–1)
CREAT SERPL-MCNC: 3.1 MG/DL (ref 0.5–1)
D DIMER: 730 NG/ML DDU
DESCRIPTION BLOOD BANK: NORMAL
DESCRIPTION BLOOD BANK: NORMAL
EOSINOPHILS ABSOLUTE: 0 E9/L (ref 0.05–0.5)
EOSINOPHILS ABSOLUTE: 0.25 E9/L (ref 0.05–0.5)
EOSINOPHILS RELATIVE PERCENT: 0.9 % (ref 0–6)
EOSINOPHILS RELATIVE PERCENT: 1.9 % (ref 0–6)
EPL-TEG: 0 % (ref 0–15)
FERRITIN: 1491 NG/ML
FIBRINOGEN: 520 MG/DL (ref 225–540)
G-TEG: 13.1 K D/SC (ref 4.5–11)
GFR AFRICAN AMERICAN: 18
GFR AFRICAN AMERICAN: 19
GFR NON-AFRICAN AMERICAN: 15 ML/MIN/1.73
GFR NON-AFRICAN AMERICAN: 15 ML/MIN/1.73
GLUCOSE BLD-MCNC: 111 MG/DL (ref 74–99)
GLUCOSE BLD-MCNC: 135 MG/DL (ref 74–99)
HCT VFR BLD CALC: 18.2 % (ref 34–48)
HCT VFR BLD CALC: 20.9 % (ref 34–48)
HCT VFR BLD CALC: 22.8 % (ref 34–48)
HCT VFR BLD CALC: 23.5 % (ref 34–48)
HCT VFR BLD CALC: 24.7 % (ref 34–48)
HEMOGLOBIN: 6 G/DL (ref 11.5–15.5)
HEMOGLOBIN: 7 G/DL (ref 11.5–15.5)
HEMOGLOBIN: 7.4 G/DL (ref 11.5–15.5)
HEMOGLOBIN: 7.6 G/DL (ref 11.5–15.5)
HEMOGLOBIN: 8.3 G/DL (ref 11.5–15.5)
HYPOCHROMIA: ABNORMAL
IMMATURE GRANULOCYTES #: 0.5 E9/L
IMMATURE GRANULOCYTES %: 3.7 % (ref 0–5)
INR BLD: 1
INR BLD: 1
K (CLOTTING TIME): 0.9 MIN (ref 1–3)
LACTATE DEHYDROGENASE: 374 U/L (ref 135–214)
LACTIC ACID: 1.2 MMOL/L (ref 0.5–2.2)
LY30 (FIBRINOLYSIS): 0 % (ref 0–8)
LYMPHOCYTES ABSOLUTE: 0.71 E9/L (ref 1.5–4)
LYMPHOCYTES ABSOLUTE: 1.47 E9/L (ref 1.5–4)
LYMPHOCYTES RELATIVE PERCENT: 11 % (ref 20–42)
LYMPHOCYTES RELATIVE PERCENT: 5.2 % (ref 20–42)
MA (MAX AMPLITUDE): 72.4 MM (ref 50–70)
MAGNESIUM: 2 MG/DL (ref 1.6–2.6)
MAGNESIUM: 2 MG/DL (ref 1.6–2.6)
MCH RBC QN AUTO: 30.2 PG (ref 26–35)
MCH RBC QN AUTO: 30.6 PG (ref 26–35)
MCHC RBC AUTO-ENTMCNC: 33 % (ref 32–34.5)
MCHC RBC AUTO-ENTMCNC: 33.5 % (ref 32–34.5)
MCV RBC AUTO: 91.3 FL (ref 80–99.9)
MCV RBC AUTO: 91.5 FL (ref 80–99.9)
METER GLUCOSE: 181 MG/DL (ref 74–99)
MONOCYTES ABSOLUTE: 0.7 E9/L (ref 0.1–0.95)
MONOCYTES ABSOLUTE: 1.5 E9/L (ref 0.1–0.95)
MONOCYTES RELATIVE PERCENT: 11.2 % (ref 2–12)
MONOCYTES RELATIVE PERCENT: 5.2 % (ref 2–12)
NEUTROPHILS ABSOLUTE: 12.13 E9/L (ref 1.8–7.3)
NEUTROPHILS ABSOLUTE: 9.64 E9/L (ref 1.8–7.3)
NEUTROPHILS RELATIVE PERCENT: 71.9 % (ref 43–80)
NEUTROPHILS RELATIVE PERCENT: 86.1 % (ref 43–80)
OVALOCYTES: ABNORMAL
PDW BLD-RTO: 16.1 FL (ref 11.5–15)
PDW BLD-RTO: 16.5 FL (ref 11.5–15)
PHOSPHORUS: 3.4 MG/DL (ref 2.5–4.5)
PHOSPHORUS: 3.4 MG/DL (ref 2.5–4.5)
PLATELET # BLD: 168 E9/L (ref 130–450)
PLATELET # BLD: 176 E9/L (ref 130–450)
PMV BLD AUTO: 10.9 FL (ref 7–12)
PMV BLD AUTO: 11.1 FL (ref 7–12)
POIKILOCYTES: ABNORMAL
POLYCHROMASIA: ABNORMAL
POTASSIUM SERPL-SCNC: 4.3 MMOL/L (ref 3.5–5)
POTASSIUM SERPL-SCNC: 4.5 MMOL/L (ref 3.5–5)
PROTHROMBIN TIME: 11.4 SEC (ref 9.3–12.4)
PROTHROMBIN TIME: 11.5 SEC (ref 9.3–12.4)
R (REACTION TIME): 4.4 MIN (ref 5–10)
RBC # BLD: 1.99 E12/L (ref 3.5–5.5)
RBC # BLD: 2.29 E12/L (ref 3.5–5.5)
SARS-COV-2, NAAT: DETECTED
SODIUM BLD-SCNC: 141 MMOL/L (ref 132–146)
SODIUM BLD-SCNC: 142 MMOL/L (ref 132–146)
TOTAL PROTEIN: 4.1 G/DL (ref 6.4–8.3)
TOTAL PROTEIN: 4.2 G/DL (ref 6.4–8.3)
TROPONIN: 0.11 NG/ML (ref 0–0.03)
WBC # BLD: 13.4 E9/L (ref 4.5–11.5)
WBC # BLD: 14.1 E9/L (ref 4.5–11.5)

## 2020-05-22 PROCEDURE — 6360000002 HC RX W HCPCS: Performed by: INTERNAL MEDICINE

## 2020-05-22 PROCEDURE — 3430000000 HC RX DIAGNOSTIC RADIOPHARMACEUTICAL: Performed by: RADIOLOGY

## 2020-05-22 PROCEDURE — 83735 ASSAY OF MAGNESIUM: CPT

## 2020-05-22 PROCEDURE — 3609017100 HC EGD: Performed by: SURGERY

## 2020-05-22 PROCEDURE — 6360000002 HC RX W HCPCS: Performed by: NURSE ANESTHETIST, CERTIFIED REGISTERED

## 2020-05-22 PROCEDURE — 86850 RBC ANTIBODY SCREEN: CPT

## 2020-05-22 PROCEDURE — C9113 INJ PANTOPRAZOLE SODIUM, VIA: HCPCS | Performed by: STUDENT IN AN ORGANIZED HEALTH CARE EDUCATION/TRAINING PROGRAM

## 2020-05-22 PROCEDURE — 85610 PROTHROMBIN TIME: CPT

## 2020-05-22 PROCEDURE — 85347 COAGULATION TIME ACTIVATED: CPT

## 2020-05-22 PROCEDURE — 36415 COLL VENOUS BLD VENIPUNCTURE: CPT

## 2020-05-22 PROCEDURE — 3700000000 HC ANESTHESIA ATTENDED CARE: Performed by: SURGERY

## 2020-05-22 PROCEDURE — 36430 TRANSFUSION BLD/BLD COMPNT: CPT

## 2020-05-22 PROCEDURE — 99221 1ST HOSP IP/OBS SF/LOW 40: CPT | Performed by: NURSE PRACTITIONER

## 2020-05-22 PROCEDURE — A9560 TC99M LABELED RBC: HCPCS | Performed by: RADIOLOGY

## 2020-05-22 PROCEDURE — 2580000003 HC RX 258: Performed by: FAMILY MEDICINE

## 2020-05-22 PROCEDURE — 78278 ACUTE GI BLOOD LOSS IMAGING: CPT

## 2020-05-22 PROCEDURE — 85014 HEMATOCRIT: CPT

## 2020-05-22 PROCEDURE — 85018 HEMOGLOBIN: CPT

## 2020-05-22 PROCEDURE — 86901 BLOOD TYPING SEROLOGIC RH(D): CPT

## 2020-05-22 PROCEDURE — 84100 ASSAY OF PHOSPHORUS: CPT

## 2020-05-22 PROCEDURE — 2580000003 HC RX 258: Performed by: INTERNAL MEDICINE

## 2020-05-22 PROCEDURE — 2709999900 HC NON-CHARGEABLE SUPPLY: Performed by: SURGERY

## 2020-05-22 PROCEDURE — 85384 FIBRINOGEN ACTIVITY: CPT

## 2020-05-22 PROCEDURE — 2000000000 HC ICU R&B

## 2020-05-22 PROCEDURE — 6370000000 HC RX 637 (ALT 250 FOR IP): Performed by: INTERNAL MEDICINE

## 2020-05-22 PROCEDURE — C9113 INJ PANTOPRAZOLE SODIUM, VIA: HCPCS | Performed by: INTERNAL MEDICINE

## 2020-05-22 PROCEDURE — 87077 CULTURE AEROBIC IDENTIFY: CPT

## 2020-05-22 PROCEDURE — 87186 SC STD MICRODIL/AGAR DIL: CPT

## 2020-05-22 PROCEDURE — 90935 HEMODIALYSIS ONE EVALUATION: CPT | Performed by: INTERNAL MEDICINE

## 2020-05-22 PROCEDURE — 5A1D70Z PERFORMANCE OF URINARY FILTRATION, INTERMITTENT, LESS THAN 6 HOURS PER DAY: ICD-10-PCS | Performed by: INTERNAL MEDICINE

## 2020-05-22 PROCEDURE — 86900 BLOOD TYPING SEROLOGIC ABO: CPT

## 2020-05-22 PROCEDURE — 87081 CULTURE SCREEN ONLY: CPT

## 2020-05-22 PROCEDURE — 99233 SBSQ HOSP IP/OBS HIGH 50: CPT | Performed by: INTERNAL MEDICINE

## 2020-05-22 PROCEDURE — 3700000001 HC ADD 15 MINUTES (ANESTHESIA): Performed by: SURGERY

## 2020-05-22 PROCEDURE — U0002 COVID-19 LAB TEST NON-CDC: HCPCS

## 2020-05-22 PROCEDURE — 0DJ08ZZ INSPECTION OF UPPER INTESTINAL TRACT, VIA NATURAL OR ARTIFICIAL OPENING ENDOSCOPIC: ICD-10-PCS | Performed by: SURGERY

## 2020-05-22 PROCEDURE — P9016 RBC LEUKOCYTES REDUCED: HCPCS

## 2020-05-22 PROCEDURE — 2580000003 HC RX 258: Performed by: NURSE ANESTHETIST, CERTIFIED REGISTERED

## 2020-05-22 PROCEDURE — 82962 GLUCOSE BLOOD TEST: CPT

## 2020-05-22 PROCEDURE — 80053 COMPREHEN METABOLIC PANEL: CPT

## 2020-05-22 PROCEDURE — 85576 BLOOD PLATELET AGGREGATION: CPT

## 2020-05-22 PROCEDURE — 6360000002 HC RX W HCPCS: Performed by: STUDENT IN AN ORGANIZED HEALTH CARE EDUCATION/TRAINING PROGRAM

## 2020-05-22 PROCEDURE — 85025 COMPLETE CBC W/AUTO DIFF WBC: CPT

## 2020-05-22 PROCEDURE — 86923 COMPATIBILITY TEST ELECTRIC: CPT

## 2020-05-22 PROCEDURE — 36591 DRAW BLOOD OFF VENOUS DEVICE: CPT

## 2020-05-22 PROCEDURE — 87070 CULTURE OTHR SPECIMN AEROBIC: CPT

## 2020-05-22 PROCEDURE — 2500000003 HC RX 250 WO HCPCS: Performed by: FAMILY MEDICINE

## 2020-05-22 RX ORDER — SODIUM CHLORIDE 9 MG/ML
INJECTION, SOLUTION INTRAVENOUS CONTINUOUS PRN
Status: DISCONTINUED | OUTPATIENT
Start: 2020-05-22 | End: 2020-05-22 | Stop reason: SDUPTHER

## 2020-05-22 RX ORDER — 0.9 % SODIUM CHLORIDE 0.9 %
20 INTRAVENOUS SOLUTION INTRAVENOUS ONCE
Status: COMPLETED | OUTPATIENT
Start: 2020-05-22 | End: 2020-05-22

## 2020-05-22 RX ORDER — SUCRALFATE 1 G/1
1 TABLET ORAL EVERY 6 HOURS SCHEDULED
Status: DISCONTINUED | OUTPATIENT
Start: 2020-05-22 | End: 2020-05-27 | Stop reason: HOSPADM

## 2020-05-22 RX ORDER — PROPOFOL 10 MG/ML
INJECTION, EMULSION INTRAVENOUS PRN
Status: DISCONTINUED | OUTPATIENT
Start: 2020-05-22 | End: 2020-05-22 | Stop reason: SDUPTHER

## 2020-05-22 RX ORDER — PANTOPRAZOLE SODIUM 40 MG/10ML
40 INJECTION, POWDER, LYOPHILIZED, FOR SOLUTION INTRAVENOUS 2 TIMES DAILY
Status: DISCONTINUED | OUTPATIENT
Start: 2020-05-22 | End: 2020-05-27 | Stop reason: HOSPADM

## 2020-05-22 RX ORDER — GABAPENTIN 300 MG/1
300 CAPSULE ORAL DAILY
Status: DISCONTINUED | OUTPATIENT
Start: 2020-05-23 | End: 2020-05-27 | Stop reason: HOSPADM

## 2020-05-22 RX ADMIN — EPOETIN ALFA-EPBX 2000 UNITS: 2000 INJECTION, SOLUTION INTRAVENOUS; SUBCUTANEOUS at 14:53

## 2020-05-22 RX ADMIN — COLLAGENASE SANTYL: 250 OINTMENT TOPICAL at 08:36

## 2020-05-22 RX ADMIN — PROPOFOL 80 MG: 10 INJECTION, EMULSION INTRAVENOUS at 14:32

## 2020-05-22 RX ADMIN — NYSTATIN: 100000 CREAM TOPICAL at 00:20

## 2020-05-22 RX ADMIN — SUCRALFATE 1 G: 1 TABLET ORAL at 11:13

## 2020-05-22 RX ADMIN — OXYCODONE HYDROCHLORIDE AND ACETAMINOPHEN 1 TABLET: 5; 325 TABLET ORAL at 09:36

## 2020-05-22 RX ADMIN — FLUCONAZOLE 200 MG: 100 TABLET ORAL at 08:36

## 2020-05-22 RX ADMIN — PIPERACILLIN AND TAZOBACTAM 3.38 G: 3; .375 INJECTION, POWDER, FOR SOLUTION INTRAVENOUS at 11:13

## 2020-05-22 RX ADMIN — VANCOMYCIN HYDROCHLORIDE 2000 MG: 10 INJECTION, POWDER, LYOPHILIZED, FOR SOLUTION INTRAVENOUS at 15:27

## 2020-05-22 RX ADMIN — PIPERACILLIN AND TAZOBACTAM 3.38 G: 3; .375 INJECTION, POWDER, FOR SOLUTION INTRAVENOUS at 22:11

## 2020-05-22 RX ADMIN — SODIUM CHLORIDE 250 ML: 9 INJECTION, SOLUTION INTRAVENOUS at 03:47

## 2020-05-22 RX ADMIN — SODIUM CHLORIDE, PRESERVATIVE FREE 10 ML: 5 INJECTION INTRAVENOUS at 08:36

## 2020-05-22 RX ADMIN — EPOETIN ALFA-EPBX 3000 UNITS: 3000 INJECTION, SOLUTION INTRAVENOUS; SUBCUTANEOUS at 14:53

## 2020-05-22 RX ADMIN — CALCITRIOL 0.25 MCG: 0.25 CAPSULE ORAL at 08:36

## 2020-05-22 RX ADMIN — GABAPENTIN 300 MG: 300 CAPSULE ORAL at 08:36

## 2020-05-22 RX ADMIN — PANTOPRAZOLE SODIUM 40 MG: 40 INJECTION, POWDER, FOR SOLUTION INTRAVENOUS at 21:24

## 2020-05-22 RX ADMIN — SODIUM CHLORIDE 8 MG/HR: 9 INJECTION, SOLUTION INTRAVENOUS at 12:11

## 2020-05-22 RX ADMIN — SODIUM CHLORIDE 20 ML: 9 INJECTION, SOLUTION INTRAVENOUS at 09:34

## 2020-05-22 RX ADMIN — Medication 29 MILLICURIE: at 16:21

## 2020-05-22 RX ADMIN — GABAPENTIN 300 MG: 300 CAPSULE ORAL at 00:16

## 2020-05-22 RX ADMIN — NYSTATIN: 100000 CREAM TOPICAL at 08:36

## 2020-05-22 RX ADMIN — MICONAZOLE NITRATE: 20.6 POWDER TOPICAL at 22:09

## 2020-05-22 RX ADMIN — PIPERACILLIN AND TAZOBACTAM 3.38 G: 3; .375 INJECTION, POWDER, FOR SOLUTION INTRAVENOUS at 00:17

## 2020-05-22 RX ADMIN — SODIUM CHLORIDE: 9 INJECTION, SOLUTION INTRAVENOUS at 14:25

## 2020-05-22 RX ADMIN — PANTOPRAZOLE SODIUM 40 MG: 40 INJECTION, POWDER, FOR SOLUTION INTRAVENOUS at 08:36

## 2020-05-22 RX ADMIN — SODIUM CHLORIDE, PRESERVATIVE FREE 10 ML: 5 INJECTION INTRAVENOUS at 21:24

## 2020-05-22 RX ADMIN — PANTOPRAZOLE SODIUM 40 MG: 40 INJECTION, POWDER, FOR SOLUTION INTRAVENOUS at 00:17

## 2020-05-22 RX ADMIN — Medication 100 MG: at 08:36

## 2020-05-22 RX ADMIN — Medication 500 MG: at 08:36

## 2020-05-22 ASSESSMENT — PAIN SCALES - GENERAL
PAINLEVEL_OUTOF10: 0
PAINLEVEL_OUTOF10: 10
PAINLEVEL_OUTOF10: 0

## 2020-05-22 ASSESSMENT — ENCOUNTER SYMPTOMS: SHORTNESS OF BREATH: 1

## 2020-05-22 NOTE — PROGRESS NOTES
Pharmacy Consultation Note  (Antibiotic Dosing and Monitoring)    Initial consult date: 5/22/20  Consulting physician: Dr. Vic Robbins  Drug(s): Vancomycin  Indication: Infected sacral pressure ulcer      Age/  Gender Height Weight IBW Dosing weight  Allergy Information   69 y.o./female 5' 1\" (154.9 cm) 215 lb 13.3 oz (97.9 kg)     Ideal body weight: 47.8 kg (105 lb 6.1 oz)  Adjusted ideal body weight: 67.8 kg (149 lb 9 oz)  102 kg  Patient has no known allergies. Other anti-infectives Start date Stop date   Zosyn     Fluconazole                 Date  Tmax WBC BUN/CR CrCL  (mL/min) Drug/Dose Time   Given Level(s)   (Time) Comments   5/22 afebrile 13.4  x HD x3.5 hours Vancomycin 2000 mg IV X1 1527 Random level 5/20 was 11.5 @0802        HD                                   Intake/Output Summary (Last 24 hours) at 5/22/2020 1342  Last data filed at 5/22/2020 1100  Gross per 24 hour   Intake 967 ml   Output 695 ml   Net 272 ml       Cultures:    Site Date Result   MRSA Nares     Wound            No results for input(s): Zoya Moratayaila in the last 72 hours. Historical Cultures:  Organism   Date Value Ref Range Status   05/04/2020 Candida albicans (A)  Preliminary   05/04/2020 Proteus mirabilis (A)  Final   05/04/2020 Enterococcus avium (A)  Final     No results for input(s): BC in the last 72 hours. Assessment:  · 78 yoF with a PMH significant for CKD on HD initially admitted on 5/3 for further evaluation of an infected sacral ulcer s/p exicisional debridement initiated on empiric ABX until 6/14 by ID. Historical cultures were revealing for MRSA, proteus, E. Avium, Candida, and anaerobic beta-lactamase negative GNR's. Pharmacy has been asked to assist with vancomycin dosing. · Goal trough = 15 - 20 mcg/ml  · HD scheduled for every MWF with an additional session on 5/23.     Plan:  · Vancomycin 2000 mg IV x1 today  · Pre-HD level tomorrow AM  · Pharmacist will follow and monitor/adjust dosing as necessary    [id number] [address] Marin. Dana Carmona, PharmD, BCPS 5/22/2020 1:42 PM

## 2020-05-22 NOTE — CONSULTS
GENERAL SURGERY  CONSULT NOTE  2020    Physician Consulted: Dr. Naun Ley  Reason for Consult: GIB  Referring Physician: Dr. Shira Salinas    HPI  Akin Quiroga is a 78 y.o. female who presents for evaluation of GI bleed. Patient has a past medical history of diabetes CKD, sarcoid, lap laquita 3/2016,  who presents for anemia from [facility]. Patient was seen on 5/3/2020 for sacral decubitus ulcer that underwent multiple debridements ultimately in the operating room. On 2020 she presented with melena, tachycardia and concern for GI bleed she underwent a EGD which demonstrated a D2 ulcer that was 1.5 cm, and antral ulcers. She underwent injection with 4 cc of epinephrine in the 0.5 cm ulcer in the antral mucosa. The D2 ulcer was injected with 5 cc of epinephrine. She was placed on a PPI drip and on 2020 underwent IR GDA embolization. She had no other signs of bleeding and on 2020 was discharged to [email or web address] She was noted to have dark stools with some clots per nursing, as patient is too large to be able to see her stool. She is noted to have a hemoglobin of 5.6. Has had 3 units transfused most recent hemoglobin 8.3. However per discussions with ICU staff there is a question of 2 other units possibly be given in transport for this patient. She is no longer on [facility]. Per nursing patient was still having dark melena stools with clots and some mixed in bright red blood. Patient was just turned prior to arrival and refused examination. She also has a coccyx wound and per documentation appears to be improving. She is again COVID positive today.       Past Medical History:   Diagnosis Date    Arthritis     knees    Chronic knee pain     Depression     Diabetes mellitus ([address]     type 2    Gall stones     Hypertension     Kidney disease     Kidney stones     Obesity     Sarcoidosis     SOBOE 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       Past Surgical History:   Procedure Laterality Date    ABDOMEN SURGERY N/A 5/4/2020    SACRAL WOUND DEBRIDEMENT CALL DRWatson WITH TIME AVAIL AM performed by Ofelia Jose MD at [address]  x3   [facility]  3/31/16    Laparoscopic-Dr. Sebastián Grady    CYSTOSCOPY  2011     for kidney stones    FOOT SURGERY  2009     right     UPPER GASTROINTESTINAL ENDOSCOPY  2.18.15    Dr. Chico Smith Findings: Mild Gastrits and Duodenitis, 2cm Hiatal Hernia    UPPER GASTROINTESTINAL ENDOSCOPY N/A 5/8/2020    EGD CONTROL HEMORRHAGE performed by Ofelia Jose MD at [address]       Medications Prior to Admission:    Prior to Admission medications    Medication Sig Start Date End Date Taking? Authorizing Provider   piperacillin-tazobactam (ZOSYN) infusion Infuse 3.375 g intravenously every 12 hours Compound per protocol. 5/12/20 6/14/20  Marcello Mcneil MD   vancomycin [facility]) infusion Compound per protocol. Dose according to level on hemodialysis . 5/12/20 6/14/20  Marcello Mcneil MD   fluconazole (DIFLUCAN) 100 MG tablet Take 2 tablets by mouth daily 5/12/20 6/14/20  Marcello Mcneil MD   acetaminophen (TYLENOL) 325 MG tablet Take 650 mg by mouth every 6 hours as needed for Pain    Historical Provider, MD   collagenase 250 UNIT/GM ointment Apply topically daily Apply topically daily. Cleanse coccyx with NSS and cover with foam boarder dressing; change daily    Historical Provider, MD   hydrOXYzine (VISTARIL) 25 MG capsule Take 25 mg by mouth every 6-8 hours as needed (agitation)    Historical Provider, MD   sodium bicarbonate 650 MG tablet Take 2 tablets by mouth 2 times daily 4/15/20   Chante Del Cid MD   mineral oil-hydrophilic petrolatum (HYDROPHOR) ointment Apply topically as needed.  4/15/20   Chante Del Cid MD   calcium acetate (PHOSLO) 667 MG capsule Take 2 capsules by mouth 3 times daily (with meals) 2/6/20   Chante Del Cid MD   hydrALAZINE Female Order Date:  5/21/2020 11:15 AM EXAM: XR ABDOMEN (KUB) (SINGLE AP VIEW) NUMBER OF IMAGES:  2 views INDICATION:  GI bleed Reason for Exam:->GI bleed Portable? ->Yes COMPARISON: None FINDINGS:  The bowel gas pattern demonstrates air with dilated loops of bowel. Most significant in the left side of the abdomen. There is evidence for previous embolization The findings suggest an obstruction. The obstruction is likely at the level of the small bowel. There is no evidence to suggest ascites or mass. Findings compatible with an obstruction.        ASSESSMENT:  78 y.o. female with anemia concern for GI bleed    PLAN:  Will arrange for EGD today  Pain and nausea control prn  Trend Hgb -- Transfuse for Hb <7 or symptomatic  IV PPI BID  NPO, IVF  D/w Dr. Cade Garcia    Electronically signed by Alyssa Barrientos MD on 5/22/20 at 11:03 AM EDT

## 2020-05-22 NOTE — CONSULTS
Palliative Care Department  Palliative Care Initial Consult  Provider: Morales Jim Day: 2    Referring Provider: Christiane Strickland MD      Reason for Consult:  [x]  Code status Discussion  [x]  Assist with goals of care  [x]  Psychosocial support  []  Symptom Management  []  Advanced Care Planning    Chief Complaint: Keitha Kussmaul is a 78 y.o. female with chief complaint of GIB. Active Hospital Problems    Diagnosis Date Noted    GI bleed [K92.2] 05/21/2020           Palliative Care Encounter/Recommendations:      - Goals of care: continue current management and to be determined     - Code Status: DNR-CCA    Subjective:     HPI:  Keitha Kussmaul is a 78 y.o. female with significant past medical history of DM2, HTN, CKD, obesity, sarcoidosis, anemia, large decubitus ulcer s/p debridement and GIB s/p embolization 5/9/20 who presented with GIB from [facility]. Patient currently COVID 19+. Patient recently admitted on 5/3 for treatment of COVID 19 and sepsis. Patient underwent debridement for a large decubitus ulcer during that admission. Patient also underwent visceral angiogram and possible embolization for antral and proximal duodenal ulcers. Patient discharged to [facility] on 5/13 and was transferred to MICU on 5/21 for GIB with hemoglobin of 5.6. Plan for diverting colostomy however awaiting COVID (-) x2. Palliative medicine consulted for goals of care, code status discussion, and support. Subjective/Events/Discussions:    Chart reviewed. Patient alert and oriented, able to carrying on meaningful medical conversation. Role of palliative medicine explained. Patient does not have living will or HCPOA however states that if she were unable to make medical decisions for herself, she would want her son, Ata Ledesma, to make decisions. Discussion regarding code status-patient wishes to change code status to [facility].  She explains she does not want compressions, defibrillation, or resuscitative medications however is okay with short term ventilation if needed. Goals of care to be determined-states she is anxious knowing she is back in the ICU. Spoke with patient's son, Rebbecca Cockayne. Updated him on plan of care. Support provided and questions answered. Will continue to follow. - Family Meeting:   Participants:No family meeting held today    -Surrogate/Legal NOK: Child    Contacts:  Prisca Ferreira, son, [phone]     Past Medical History:   Diagnosis Date    Arthritis     knees    Chronic knee pain     Depression     Diabetes mellitus ([address]     type 2    Gall stones     Hypertension     Kidney disease     Kidney stones     Obesity     Sarcoidosis     SOBOE 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       Past Surgical History:   Procedure Laterality Date    ABDOMEN SURGERY N/A 5/4/2020    SACRAL WOUND DEBRIDEMENT CALL  WITH TIME AVAIL AM performed by Estrellita Quan MD at [address] [place], [place] [address]   [address] [place] [address]  3/31/16    Laparoscopic-Dr. Oli Chand    CYSTOSCOPY  2011     for kidney stones    FOOT SURGERY  2009     right     UPPER GASTROINTESTINAL ENDOSCOPY  2.18.15    Dr. Christie Soriano Findings: Mild Gastrits and Duodenitis, 2cm Hiatal Hernia    UPPER GASTROINTESTINAL ENDOSCOPY N/A 5/8/2020    EGD CONTROL HEMORRHAGE performed by Estrellita Quan MD at [address]       Family History   Problem Relation Age of Onset    Cancer Sister        Social history:  [place] status: no   Marital status: no   Living status: single family home prior to [facility]  Work history: owned a beauty salon    No Known Allergies    ROS: UNLESS STATED PATIENT DENIES:  CONSTITUTIONAL:  fever, chill, rigors, nausea, vomiting, fatigue.   HEENT: blurry vision, double vision, hearing problem, tinnitus, hoarseness, dysphagia, odynophagia  RESPIRATORY: cough, shortness of breath, sputum coordination of care at the bedside regarding goals of care, diagnosis and prognosis and see above. Assessment/Plan   Acute GIB: general surgery following, EGD today, follow H/H  Goals of care: continue current management  Code status: DNR-CCA  Support: provided and remains ongoing; spoke to son, Aroldo Rasmussen PABLO-CNP  Palliative Medicine    Discussed patient and the plan of care with the other IDT members of Palliative Care, and with patient, family and floor nurse. Thank you for allowing Palliative Medicine to participate in the care of [facility]. Note: This report was completed using [email or web address] voiced recognition software. Every effort has been made to ensure accuracy; however, inadvertent computerized transcription errors may be present.

## 2020-05-22 NOTE — CONSULTS
iate level of access for closure device. An Angio-Seal device was successfully deployed at the right common femoral artery access. The patient tolerated the procedure well. There are no complications. Time out occurred at 14: 07 PM hours. Patient received 45 minutes and 36 seconds of fluoroscopy. The patient received general anesthesia which is documented separately by the anesthesia team.     1. Successful prophylactic GDA embolization. Arteriograms of the aorta, celiac artery, SMA, common hepatic artery and GDA. No active extravasation was identified. ?     Ir Embolization Hemorrhage    Result Date: 5/10/2020  Patient MRN:  [id number] : 1950 Age: 78 years Gender: Female Order Date:  2020 5:00 PM EXAM: IR ART CATH PLACEMENT ABD/PEL/LOW EXT 1ST ORDER, IR ANGIOGRAM CELIAC, IR ANGIOGRAM SELECTIVE EACH ADDITIONAL VESSEL, IR ANGIOGRAM SELECTIVE EACH ADDITIONAL VESSEL, IR ANGIOGRAM SUPERIOR MESENTERIC, IR RADHA ART CATH ABD PELV LOWER EXT INIT 3RD+ ORDER, IR EMBOLIZATION HEMORRHAGE NUMBER OF IMAGES:  9 INDICATION:  bleed bleed COMPARISON: None FLUORO TIME: 45 MIN 36 SEC DAP: [id number] uGym2 AK: 900 mGy Procedure: After obtaining informed consent and following the routine sterile prep and drape and after administration of local anesthesia a needle was inserted into the right common femoral artery. A microwire was advanced through the needle. A 5 Bahamian access sheath was then exchanged for the needle. A working wire was then placed through the 5 Western [place] access sheath. The 5 Bahamian access sheath was then replaced for a short 6 Western [place] sheath. The sheath was connected to a saline drip. The 5 Bahamian pigtail catheter was then advanced over the working wire. The celiac artery was then selectively catheterized. A digital subtraction angiogram was performed demonstrating no active extravasation. The 5 Bahamian catheter was then used to select the superior mesenteric artery. No active extravasation was identified.  The 5 Western Lydia catheter was then used to select the celiac artery again. A microcatheter was used to selectively catheterize the common hepatic artery. A digital subtraction angiogram was performed. No active extravasation was identified. The microcatheter was then advanced into the gastroduodenal artery. A digital subtraction angiogram was performed. No active extravasation was identified. Based on findings from the prior upper endoscopy procedure, the decision was then made to prophylactically embolize the gastroduodenal artery. Multiple coils were then used to embolize the GDA to completion. Postprocedural digital subtraction angiogram demonstrated no residual flow through the GDA. The wires and catheters were then removed. A digital subtraction angiogram was performed through the right common femoral sheath, demonstrating an appropriate level of access for closure device. An Angio-Seal device was successfully deployed at the right common femoral artery access. The patient tolerated the procedure well. There are no complications. Time out occurred at 14: 07 PM hours. Patient received 45 minutes and 36 seconds of fluoroscopy. The patient received general anesthesia which is documented separately by the anesthesia team.     1. Successful prophylactic GDA embolization. Arteriograms of the aorta, celiac artery, SMA, common hepatic artery and GDA. No active extravasation was identified. ?     Ir Art Cath Placement Abd/pel/low Ext 1st Order    Result Date: 5/10/2020  Patient MRN:  [id number] : 1950 Age: 78 years Gender: Female Order Date:  2020 5:00 PM EXAM: IR ART CATH PLACEMENT ABD/PEL/LOW EXT 1ST ORDER, IR ANGIOGRAM CELIAC, IR ANGIOGRAM SELECTIVE EACH ADDITIONAL VESSEL, IR ANGIOGRAM SELECTIVE EACH ADDITIONAL VESSEL, IR ANGIOGRAM SUPERIOR MESENTERIC, IR RADHA ART CATH ABD PELV LOWER EXT INIT 3RD+ ORDER, IR EMBOLIZATION HEMORRHAGE NUMBER OF IMAGES:  9 INDICATION:  bleed bleed COMPARISON: None FLUORO TIME: 45 MIN 36 SEC DAP: 99200.11 uGym2 AK: 900 mGy Procedure: After obtaining informed consent and following the routine sterile prep and drape and after administration of local anesthesia a needle was inserted into the right common femoral artery. A microwire was advanced through the needle. A 5 [place] citizen access sheath was then exchanged for the needle. A working wire was then placed through the 5 Western Lydia access sheath. The 5 [place] citizen access sheath was then replaced for a short 6 Western Lydia sheath. The sheath was connected to a saline drip. The 5 [place] citizen pigtail catheter was then advanced over the working wire. The celiac artery was then selectively catheterized. A digital subtraction angiogram was performed demonstrating no active extravasation. The 5 [place] citizen catheter was then used to select the superior mesenteric artery. No active extravasation was identified. The 5 [place] citizen catheter was then used to select the celiac artery again. A microcatheter was used to selectively catheterize the common hepatic artery. A digital subtraction angiogram was performed. No active extravasation was identified. The microcatheter was then advanced into the gastroduodenal artery. A digital subtraction angiogram was performed. No active extravasation was identified. Based on findings from the prior upper endoscopy procedure, the decision was then made to prophylactically embolize the gastroduodenal artery. Multiple coils were then used to embolize the GDA to completion. Postprocedural digital subtraction angiogram demonstrated no residual flow through the GDA. The wires and catheters were then removed. A digital subtraction angiogram was performed through the right common femoral sheath, demonstrating an appropriate level of access for closure device. An Angio-Seal device was successfully deployed at the right common femoral artery access. The patient tolerated the procedure well. There are no complications. Time out occurred at 14: 07 PM hours.  Patient received 45 minutes and 36 seconds of fluoroscopy. The patient received general anesthesia which is documented separately by the anesthesia team.     1. Successful prophylactic GDA embolization. Arteriograms of the aorta, celiac artery, SMA, common hepatic artery and GDA. No active extravasation was identified. ? Resident's Assessment and Plan     Sandi Priest is a 78 y.o. female with PMH of DM2, HTN, CKD, obesity, Sarcoidosis, Anemia, large decubitus ulcer s/p debridement and GIB s/p embolization 5/9/20 who was transferred from [facility] for Acute GIB and Hg of 5.6.  Currently COVID (+)x3    <Neurologic>  - AAOx3    <Cardiovascular>  Hypertension  - On hydralazine on admission  - BP borderline hypotensive  - Hold BP meds for now    <Pulmonary>  Covid PNA  - Last Covid 5/14- (+)  - Repeat Covid test  - IL-6 ordered  - Ferritin 1,491, D-Dimer 730, , Fibrinogen 520  - ID currently following  - Start Vitamin C and Zinc supplementation  - CXR for tomorrow    <Gastrointestinal>  Acute on chronic GIB  - GIB on 5/8  - EGD 5/9- D2 Ulcer and Antral Ulcer  - Embolization on 5/9  - Hg stable until this morning- 5.6  - [facility] here [facility]  - H/H post transfusion in [facility]- 7.1  - Hg overnight 6.0- transfusion ordered  - Transfused in <7  - PPI BID  - Consult GSY  - Keep NPO for now    Transaminitis  - ALT 37, AST 67  - Continue to monitor daily CMP    <Infectious Disease>  Decubitus Ulcer  - S/p debridement on 5/4  - Wound cx- Enterococcus, Candida, Proteus  - On Vancomycin, Zosyn and Fluconazole to be continued until 6/14  - ID following- will consult here  - Consult to Wound care  - GSY consult  - Previously ID and GSY wanted to do diverting colostomy, awaiting for Covid (-) x2    Leukocytosis  - WBC increased from 8.9 --> 11.6--> 18.3 -- 14.1  - On Vancomycin, Zosyn and Fluconazole to be continued until 6/14   - Panculture sent  - CXR for tomorrow   - ID consulted    <Endocrine>  NIDDM  - Was not on home medications (according to EMR)  - Last A1C 5/18- 6.9  - Lantus 10 U nightly in [place]  - Currently NPO  - Will assess BGs and determine  Insulin requirements  - BG Q4H    <Genitourinary/Renal>  CKD  - Cr-2.9 on admission  - Currently on HD   - Consult nephrology  - BMP daily   - Patient is due for HD on 5/22 per records    <Hematology/Oncology>  Acute on Chronic Anemia  - Baseline hg- 8.5-9  - Hg on admission- 5.6  - Likely 2/2 GIB plus CKD  - H/H Q6H  - Transfuse if <7    PT/OT evaluation:  DVT prophylaxis/ GI prophylaxis: hold for now due to GIB  Disposition: home +/- home health / Gerard Bolivar / [place] [address] / Mick Doran MD, PGY-1   Attending physician: Dr. Felix Hopkins  Department of Pulmonary, Critical Care and Sleep Medicine  [address] [facility]  Department of Internal Medicine      During multidisciplinary team rounds Kale Shah is a 78 y.o. female was seen, examined and discussed. This is confirmation that I have personally seen and examined the patient and that the key elements of the encounter were performed by me (> 85 % time). The medications & laboratory data was discussed and adjusted where necessary. The radiographic images were reviewed or with radiologist or consultant if felt dis-concordant with the exam or history. The above findings were corroborated, plans confirmed and changes made if needed. Family is updated at the bedside as available. Key issues of the case were discussed among consultants. Critical Care time is documented if appropriate.       Emanuel Morales DO, FACP, FCCP, [place],

## 2020-05-22 NOTE — ANESTHESIA PRE PROCEDURE
acetaminophen (TYLENOL) tablet 650 mg  650 mg Oral Q6H PRN Jeramy Srinivasan MD        Or    acetaminophen (TYLENOL) suppository 650 mg  650 mg Rectal Q6H PRN Jeramy Srinivasan MD        piperacillin-tazobactam (ZOSYN) 3.375 g in dextrose 5 % 100 mL IVPB extended infusion (mini-bag)  3.375 g Intravenous Q12H Jeramy Srinivasan MD 25 mL/hr at 05/22/20 1113 3.375 g at 05/22/20 1113    oxyCODONE-acetaminophen (PERCOCET) 5-325 MG per tablet 1 tablet  1 tablet Oral Q6H PRN Jeramy Srinivasan MD   1 tablet at 05/22/20 0936    vitamin C (ASCORBIC ACID) tablet 500 mg  500 mg Oral Daily Jeramy Srinivasan MD   500 mg at 05/22/20 7613    zinc sulfate (ZINCATE) capsule 100 mg  100 mg Oral Daily Jeramy Srinivasan MD   100 mg at 05/22/20 0836    0.9 % sodium chloride infusion admixture   Intravenous Q12H Jeramy Srinivasan MD           Allergies:  No Known Allergies    Problem List:    Patient Active Problem List   Diagnosis Code    Neurologic gait dysfunction R26.9    Renal failure, acute on chronic (HCC) N17.9, N18.9    Diabetes mellitus ([address] E11.9    Hypertension I10    Arthritis M19.90    Kidney disease N28.9    Knee problem M25.9    Anemia due to stage 3 chronic kidney disease (HCC) N18.3, D63.1    PERLA (acute kidney injury) ([address] N17.9    Primary osteoarthritis of both knees M17.0    Acute on chronic renal insufficiency N28.9, N18.9    Acute renal failure ([facility]) J63.6    Metabolic acidosis F45.5    Acute renal failure superimposed on chronic kidney disease, on chronic dialysis ([facility]) N17.9, N18.9, Z99.2    Sepsis ([address] A41.9    2019 novel coronavirus disease (COVID-19) U07.1    GI bleed K92.2       Past Medical History:        Diagnosis Date    Arthritis     knees    Chronic knee pain     Depression     Diabetes mellitus ([address]     type 2    Gall stones     Hypertension     Kidney disease     Kidney stones     Obesity     Sarcoidosis     SOBOE (shortness of breath on exertion)     Tremor     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 Mild tricuspid regurgitation. Anesthesia Evaluation  Patient summary reviewed no history of anesthetic complications:   Airway: Mallampati: III  TM distance: >3 FB   Neck ROM: full  Mouth opening: > = 3 FB Dental:      Comment: Poor dentition with multiple missing teeth    Pulmonary: breath sounds clear to auscultation  (+) shortness of breath:                            ROS comment: +COVID-19  Former smoker   Cardiovascular:  Exercise tolerance: no interval change,   (+) hypertension: moderate, NAVARRO:,       ECG reviewed  Rhythm: regular  Rate: normal  Echocardiogram reviewed                  Neuro/Psych:   (+) psychiatric history:depression/anxiety             GI/Hepatic/Renal:   (+) renal disease: ESRD, dialysis and kidney stones, morbid obesity         ROS comment: GI bleed. Endo/Other:    (+) DiabetesType II DM, poorly controlled, , blood dyscrasia: anemia, arthritis:., .                 Abdominal:   (+) obese,         Vascular:                                          Anesthesia Plan      MAC     ASA 4       Induction: intravenous. MIPS: Postoperative opioids intended. Anesthetic plan and risks discussed with patient. Use of blood products discussed with patient whom consented to blood products. Plan discussed with attending. PABLO Castro CRNA   5/22/2020      Pt seen, examined, chart reviewed, plan discussed.   Aidan Sweeney  5/22/2020  1:19 PM

## 2020-05-22 NOTE — PROGRESS NOTES
Per [facility] P&T Committee approval, an order for Zofran and/or Phenergan has been discontinued for this patient due to the risk of QT prolongation with COVID-19 medication therapy. If an antiemetic is indicated for your patient, please consider use of Tigan (trimethobenzamide). Please contact the Pharmacy with any questions.

## 2020-05-22 NOTE — PROGRESS NOTES
Patient transported to [facility]. No complications and all vital signs stable.

## 2020-05-22 NOTE — ANESTHESIA POSTPROCEDURE EVALUATION
Department of Anesthesiology  Postprocedure Note    Patient: Froylan Darling  MRN: [id number]  YOB: 1950  Date of evaluation: 5/22/2020  Time:  3:25 PM     Procedure Summary     Date:  05/22/20 Room / Location:  [address] / [facility]    Anesthesia Start:   Anesthesia Stop:      Procedure:  EGD BEDSIDE (N/A ) Diagnosis:  (GIB)    Surgeon:  Abraham Velázquez MD Responsible Provider:      Anesthesia Type:  MAC ASA Status:  4          Anesthesia Type: MAC    Nuzhat Phase I:      Nuzhat Phase II:      Last vitals: Reviewed and per EMR flowsheets.        Anesthesia Post Evaluation    Patient location during evaluation: ICU  Patient participation: complete - patient participated  Level of consciousness: awake  Pain score: 3  Airway patency: patent  Nausea & Vomiting: no nausea and no vomiting  Complications: no  Cardiovascular status: blood pressure returned to baseline  Respiratory status: acceptable  Hydration status: euvolemic

## 2020-05-22 NOTE — PROGRESS NOTES
[address]    Department of Internal Medicine   Internal Medicine Residency  MICU Progress Note    Patient:  Anne Devine 78 y.o. female   MRN: [id number]       Date of Service: 2020    Allergy: Patient has no known allergies. Subjective     Patient continues to have dark tarry stools  Post transfusion hg- 7.0, will transfuse second unit and monitor Hg. Awaiting specialty recommendations. For EGD today     24 hour change: Hg- 6.0, 2 Units of PRBC prepared; 1 Unit transfused. Objective     TEMPERATURE:  Current - Temp: 98.6 °F (37 °C); Max - Temp  Av.7 °F (36.5 °C)  Min: 97 °F (36.1 °C)  Max: 98.6 °F (37 °C)  RESPIRATIONS RANGE: Resp  Av.5  Min: 12  Max: 20  PULSE RANGE: Pulse  Av.7  Min: 80  Max: 100  BLOOD PRESSURE RANGE:  Systolic (03JAW), HYH:664 , Min:82 , GIANNI:608   ; Diastolic (43OSV), QTV:00, Min:50, Max:72    PULSE OXIMETRY RANGE: SpO2  Av %  Min: 93 %  Max: 100 %    I & O - 24hr:    Intake/Output Summary (Last 24 hours) at 2020 0813  Last data filed at 2020 0600  Gross per 24 hour   Intake 527 ml   Output 535 ml   Net -8 ml     I/O last 3 completed shifts: In: 527 [P.O.:60; I.V.:117; Blood:350]  Out: 535 [Urine:535] No intake/output data recorded. Weight change:     Physical Exam  Due to Covid-19 precautions physical exam was not performed. Chart was reviewed and case discussed with the nurse. There is no changes from previous physical exam noted on Consult note.      Medications     Continuous Infusions:   dextrose       Scheduled Meds:   sodium chloride  20 mL Intravenous Once    sodium chloride flush  10 mL Intravenous 2 times per day    gabapentin  300 mg Oral TID    calcitRIOL  0.25 mcg Oral Daily    collagenase   Topical Daily    fluconazole  200 mg Oral Daily    pantoprazole  40 mg Intravenous BID    piperacillin-tazobactam  3.375 g Intravenous Q12H    nystatin   Topical BID    vitamin C  500 mg Oral Daily    zinc sulfate  100 mg Oral Daily    sodium chloride   Intravenous Q12H     PRN Meds: sodium chloride flush, polyethylene glycol, promethazine **OR** ondansetron, hydrOXYzine, glucose, dextrose, glucagon (rDNA), dextrose, acetaminophen **OR** acetaminophen, oxyCODONE-acetaminophen      Labs and Imaging Studies     CBC:   Recent Labs     05/21/20  0654 05/21/20  1516 05/21/20  2340 05/22/20  0630   WBC 18.3*  --  14.1* 13.4*   HGB 5.6* 7.2* 6.0* 7.0*   HCT 17.8* 21.8* 18.2* 20.9*   MCV 97.3  --  91.5 91.3     --  176 168       BMP:    Recent Labs     05/20/20  0802 05/21/20  2340 05/22/20  0630    141 142   K 4.7 4.5 4.3    107 107   CO2 27 26 25   BUN 51* 52* 54*   CREATININE 2.9* 3.0* 3.1*   GLUCOSE 175* 111* 135*       LIVER PROFILE:   Recent Labs     05/20/20  0802 05/21/20  2340 05/22/20  0630   AST 24 67* 58*   ALT 16 37* 36*   BILITOT <0.2 0.3 0.3   ALKPHOS 149* 119* 100       PT/INR:   Recent Labs     05/21/20  0654 05/21/20  2340 05/22/20  0630   PROTIME 11.5 11.5 11.4   INR 1.0 1.0 1.0       APTT:   Recent Labs     05/21/20  0654   APTT 24.6       Fasting Lipid Panel:    Lab Results   Component Value Date    CHOL 191 10/12/2011    TRIG 181 10/12/2011    HDL 37.0 10/12/2011       Cardiac Enzymes:    Lab Results   Component Value Date    CKTOTAL 25 (L) 08/16/2013    CKTOTAL 28 (L) 08/15/2013    CKTOTAL 622 (H) 06/26/2011    CKMB 1.0 08/15/2013    CKMB 3.9 06/26/2011    CKMB 3.0 06/26/2011    TROPONINI 0.11 (H) 05/21/2020    TROPONINI 0.12 (H) 04/11/2020    TROPONINI 0.19 (H) 02/01/2020       Notable Cultures:      Blood cultures   Blood Culture, Routine   Date Value Ref Range Status   05/03/2020 5 Days- no growth  Final     Respiratory cultures No results found for: RESPCULTURE   Gram Stain Result   Date Value Ref Range Status   05/03/2020 Refer to culture Gram stain for results  Final     Urine   Urine Culture, Routine   Date Value Ref Range Status   05/04/2020 <10,000 CFU/mL  Yeast   (A)  Final   05/04/2020 50,000 CFU/ml  Final     Legionella No results found for: LABLEGI  C Diff PCR No results found for: CDIFPCR  Wound culture/abscess: No results for input(s): WNDABS in the last 72 hours. Tip culture:No results for input(s): CXCATHTIP in the last 72 hours. Antibiotic  Days  Day started                                Oxygen:     Vent Information  SpO2: 99 %  Additional Respiratory  Assessments  Pulse: 81  Resp: 15  SpO2: 99 %  Oral Care: Lip moisturizer applied     Nasal cannula L/min     Face mask %     Reservoirs mask %       ABG   Vent Settings     PH   Mode      PCO2   TV      PO2   RR      HCO3   PS      Sat%   PEEP      FIO2   FIO2        P/F          Lines:  Site  Day  Date inserted     TLC              PICC              Arterial line              Peripheral line              HD cath            Urethral Catheter-Output (mL): 45 mL    Imaging Studies:      Resident's Assessment and Plan   Juan Carlos Contreras is a 78 y.o. female with PMH of DM2, HTN, CKD, obesity, Sarcoidosis, Anemia, large decubitus ulcer s/p debridement and GIB s/p embolization 5/9/20 who was transferred from [facility] for Acute GIB and Hg of 5.6.  Currently COVID (+)x3     <Neurologic>  - AAOx3     <Cardiovascular>  Hypertension  - On hydralazine on admission  - BP borderline hypotensive  - Hold BP meds for now     <Pulmonary>  Covid PNA  - Last Covid 5/14- (+)  - Repeat Covid test  - IL-6 ordered  - Ferritin 1,491, D-Dimer 730, , Fibrinogen 520  - ID currently following  - Start Vitamin C and Zinc supplementation  - CXR for tomorrow     <Gastrointestinal>  Acute on chronic GIB  - GIB on 5/8  - EGD 5/9- D2 Ulcer and Antral Ulcer  - Embolization on 5/9  - Hg stable until this morning- 5.6  - Transferred here from [facility]  - H/H post transfusion in [facility]- 7.1  - Hg overnight 6.0- transfusion ordered  - Transfused in <7  - PPI BID  - Consult GSY  - Keep NPO for now     Transaminitis  - ALT 37, AST 67  - Continue to monitor daily

## 2020-05-22 NOTE — FLOWSHEET NOTE
05/22/20 1400   Vital Signs   /75   Temp 98.9 °F (37.2 °C)   Pulse 91   Resp 12   SpO2 98 %   Height 5' 1\" (1.549 m)   Weight 225 lb 5 oz (102.2 kg)   Percent Weight Change 1.19   Pain Assessment   Pain Assessment 0-10   Patient's Stated Pain Goal No pain   Post-Hemodialysis Assessment   Post-Treatment Procedures Blood returned;Catheter capped, clamped and heparinized x 2 ports   Machine Disinfection Process Exterior Machine Disinfection   Rinseback Volume (ml) 300 ml   Total Liters Processed (l/min) 9 l/min   Dialyzer Clearance Lightly streaked   Duration of Treatment (minutes) 30 minutes   Hemodialysis Intake (ml) 300 ml   Hemodialysis Output (ml) 190 ml   NET Removed (ml) 110 ml   Tolerated Treatment Good   Patient Response to Treatment pt blood returned, per request of physician for emergency EGD, new system set up, cath closed c nss, heparin instilled to fill volume, caps applied   Bilateral Breath Sounds Diminished   Edema Generalized   Edema Generalized +1   RUE Edema +1   LUE Edema +1   RLE Edema Trace   LLE Edema Trace   Physician Notified? No   dialysis stopped r/t patient needing emergent EGD, arrived back to ICU to resume treatment.  Patient is leaving to go for bleeding scan and will be gone between 2-4 hours

## 2020-05-22 NOTE — H&P
Error
EXAMINATION:  GENERAL:  She is lying in bed, flat in bed with no respiratory distress. VITAL SIGNS:  Temperature 98.9, pulse 87, respirations 12 nonlabored,  blood pressure 102/61, O2 sats 100% on room air. SKIN:  Shows pallor but no jaundice. EYES:  Reveal no icterus. NECK:  Supple without bruits or masses. CHEST:  Clear to auscultation. HEART:  Regular rate and rhythm. ABDOMEN:  Obese, soft, nontender. LOWER EXTREMITIES:  Reveal no edema. NEUROLOGICAL:  She is alert and oriented x3. Does not appear to be in  any focal neurological deficits. SKIN:  Once again she has a large sacral decubitus stage IV that has  undergone debridement in her sacral/buttocks area. LABORATORY DATA:  Creatinine 3.1 which is basically at her baseline,  blood sugar 135, albumin is low at 1.5. White count 13.4, hemoglobin  was 7 and underwent another transfusion, present hemoglobin just drawn  8.3. COVID once again is positive. IMPRESSION:  GI bleed, COVID positive, acute on chronic renal failure  which appears to be stable at this point. PLAN:  Surgery had seen her, plan an EGD, continue monitoring blood  count, transfuse as needed, sacral decubitus care per Surgery also. Discharge plan more than likely back to [facility] once stable.         Shantel Quintero MD    D: 05/22/2020 13:16:18       T: 05/22/2020 13:25:45     [email or web address]  Job#: [id number]     Doc#: [id number]    CC:

## 2020-05-22 NOTE — FLOWSHEET NOTE
05/22/20 1033   Encounter Summary   Services provided to: Family  (son Alvarez Varma)   Referral/Consult From:   (by phone)   Support System Children   Continue Visiting   (5/22)   Complexity of Encounter High   Spiritual Assessment Completed Yes   Spiritual/Tenriism   Type Spiritual support   Assessment Calm;Coping   Intervention Active listening;Explored feelings, thoughts, concerns;Prayer;Discussed belief system/Uatsdin practices/lizabeth;Discussed illness/injury and it's impact   Outcome Expressed gratitude;Expressed feelings/needs/concerns;Engaged in conversation;Receptive;Encouraged

## 2020-05-22 NOTE — PROGRESS NOTES
Coordination of care discussion and chart review with PM team. Pt admitted from [facility] , [address] of DM2, HTN, CKD, obesity, Sarcoidosis, Anemia, large decubitus ulcer s/p debridement and GIB s/p embolization 5/9/20 who was  Currently COVID (+)x3. No advance directives on file. Next of kin is her son Evelyn Patel and daughter Milind Zamora.

## 2020-05-22 NOTE — PROGRESS NOTES
No COVID tests available tonight. Will need to order as send out tomorrow per clinical manager, Jayde Galindo. Pt has had multiple positive tests-5/3,8/8,5/14. Isolation precautions are already in place for COVID at this time.

## 2020-05-22 NOTE — OP NOTE
Operative Note      Patient: Catarino Valadez  YOB: 1950  MRN: [id number]    Date of Procedure: 5/22/2020    Pre-Op Diagnosis: GIB    Post-Op Diagnosis: Same       Procedure(s):  EGD BEDSIDE    Surgeon(s):  Rom Correa MD    Assistant:   Resident: Vega Barajas MD    Anesthesia: Monitor Anesthesia Care    Estimated Blood Loss (mL): Minimal    Complications: None    Specimens:   * No specimens in log *    Implants:  * No implants in log *      Drains:   Urethral Catheter (Active)   Catheter Indications Need for fluid management in critically ill patients in a critical care setting not able to be managed by other means such as BSC with hat, bedpan, urinal, condom catheter, or short term intermittent urethral catherization 5/22/2020  2:00 PM   Site Assessment No urethral drainage 5/22/2020  2:00 PM   Urine Color Yellow 5/22/2020  2:00 PM   Urine Appearance Clear 5/22/2020  2:00 PM   Output (mL) 30 mL 5/22/2020  1:00 PM       [REMOVED] NG/OG/NJ/NE Tube Nasogastric Right nostril (Removed)   Surrounding Skin Intact 5/10/2020 12:00 AM   Securement device Yes 5/10/2020 12:00 AM   Status Clamped 5/10/2020  8:00 AM   Placement Verified by Gastric Contents 5/10/2020 12:00 AM   NG/OG/NJ/NE External Measurement (cm) 60 cm 5/9/2020  4:00 AM   Drainage Appearance Edger Brenner; Munoz 5/9/2020  4:00 AM   Tube Feeding Intake (mL) 120 ml 5/9/2020 10:00 AM   Free Water Flush (mL) 60 mL 5/10/2020  8:00 AM   Output (mL) 50 ml 5/9/2020  6:00 AM       [REMOVED] Urethral Catheter 16 fr (Removed)   Catheter Indications Stage III or IV perineal and sacral wound OR full thickness perineal/lower extremity burns in incontinent patients 5/6/2020  8:19 PM   Site Assessment No urethral drainage 5/6/2020  8:19 PM   Urine Color Yellow 5/6/2020  8:19 PM   Urine Appearance Clear 5/6/2020  8:19 PM   Urine Odor Malodorous 5/6/2020  5:42 PM   Output (mL) 100 mL 5/6/2020 10:32 PM       [REMOVED] External Urinary Catheter (Removed) [REMOVED] Fecal Management System (Removed)   Stool Appearance Watery 5/12/2020  8:00 PM   Stool Color Black; Brown 5/12/2020  8:00 PM   Stool Amount Large 5/12/2020  8:00 PM   Fecal Management Tube Output 100 ml 5/12/2020  8:00 PM       Findings: healing prepyloric/antral ulcers, healed duodenal ulcer, some greater cure moderate gastritis with healing ulcerations on the cardia into the fundus    Detailed Description of Procedure:   HISTORY: The patient is a 78y.o. year old female with history of above preop diagnosis. I recommended esophagogastroduodenoscopy with possible biopsy and I explained the risk, benefits, expected outcome, and alternatives to the procedure. Risks included but are not limited to bleeding, infection, respiratory distress, hypotension, and perforation of the esophagus, stomach, or duodenum. Patient understands and is in agreement. PROCEDURE: The patient was given IV conscious sedation per anesthesia. The patient was given supplemental oxygen by nasal cannula. The gastroscope was inserted orally and advanced under direct vision through the esophagus, through the stomach, through the pylorus, and into the descending duodenum. Findings:  Duodenum:     Descending: normal    Bulb: normal- previous large ulcer healed without active stigmata of bleeding    Stomach:    Antrum: abnormal: healing prepyloric ulcer, no signs of bleeding    Body: abnormal: mild to moderate gastritis with healing ulcerations on the cardia into the body    Fundus: normal    Esophagus: normal    Larynx: normal    The scope was removed and the patient tolerated the procedure well. IMPRESSION/PLAN:   1. PPI BID  2. Carafate  3. NPO  4.  Monitor h/h if continued signs of bleeding will obtain bleeding scan    Electronically signed by Usman Whittaker MD  on 5/22/2020 at 2:47 PM       Electronically signed by Usman Whittaker MD on 5/22/2020 at 2:44 PM

## 2020-05-22 NOTE — PROGRESS NOTES
Department of Internal Medicine  Nephrology Attending Progress Note    SUBJECTIVE:  We are following this patient for end-stage renal failure . 5/22 :pt transferred here from [facility] for gi bleed.      PROBLEM LIST:    Patient Active Problem List   Diagnosis    Neurologic gait dysfunction    Renal failure, acute on chronic (HCC)    Diabetes mellitus ([facility]    Hypertension    Arthritis    Kidney disease    Knee problem    Anemia due to stage 3 chronic kidney disease (HCC)    PERLA (acute kidney injury) ([facility]    Primary osteoarthritis of both knees    Acute on chronic renal insufficiency    Acute renal failure (HCC)    Metabolic acidosis    Acute renal failure superimposed on chronic kidney disease, on chronic dialysis ([facility]    Sepsis ([facility]    2019 novel coronavirus disease (COVID-19)    GI bleed        PAST MEDICAL HISTORY:    Past Medical History:   Diagnosis Date    Arthritis     knees    Chronic knee pain     Depression     Diabetes mellitus (HCC)     type 2    Gall stones     Hypertension     Kidney disease     Kidney stones     Obesity     Sarcoidosis     SOBOE 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       MEDS (scheduled):   miconazole   Topical BID    collagenase   Topical Daily    sucralfate  1 g Oral 4 times per day    [START ON 5/23/2020] gabapentin  300 mg Oral Daily    epoetin derek-epbx  2,000 Units Subcutaneous Once    And    epoetin derek-epbx  3,000 Units Subcutaneous Once    sodium chloride flush  10 mL Intravenous 2 times per day    calcitRIOL  0.25 mcg Oral Daily    fluconazole  200 mg Oral Daily    piperacillin-tazobactam  3.375 g Intravenous Q12H    vitamin C  500 mg Oral Daily    zinc sulfate  100 mg Oral Daily    sodium chloride   Intravenous Q12H       MEDS (infusions):   pantoprozole (PROTONIX) infusion 8 mg/hr (05/22/20 1211)    dextrose         MEDS (prn):  sodium chloride flush, polyethylene glycol, hydrOXYzine, glucose, dextrose, glucagon (rDNA), dextrose, acetaminophen **OR** acetaminophen, oxyCODONE-acetaminophen    DIET:    Diet NPO Effective Now      PHYSICAL EXAM:      Patient Vitals for the past 24 hrs:   BP Temp Temp src Pulse Resp SpO2 Height Weight   05/22/20 1100 120/70 -- -- 92 15 98 % -- --   05/22/20 1000 (!) 103/54 -- -- 92 26 100 % -- --   05/22/20 0943 -- -- -- 94 -- -- -- --   05/22/20 0900 135/69 -- -- 91 13 99 % -- --   05/22/20 0800 102/61 98.9 °F (37.2 °C) Temporal 87 12 100 % -- --   05/22/20 0700 (!) 106/52 -- -- 81 15 99 % -- --   05/22/20 0600 124/72 -- -- 90 16 100 % -- 215 lb 13.3 oz (97.9 kg)   05/22/20 0500 (!) 119/50 98.6 °F (37 °C) Temporal 80 19 100 % -- --   05/22/20 0400 (!) 121/57 -- -- 94 17 99 % -- --   05/22/20 0300 (!) 105/51 97.7 °F (36.5 °C) Temporal 92 15 100 % -- --   05/22/20 0200 (!) 82/55 -- -- 88 15 99 % -- --   05/22/20 0100 116/63 -- -- 85 16 100 % -- --   05/22/20 0000 (!) 122/57 97.5 °F (36.4 °C) Temporal 88 17 100 % -- --   05/21/20 2300 (!) 97/51 -- -- 85 19 100 % -- --   05/21/20 2215 -- -- -- -- -- -- 5' 1\" (1.549 m) 215 lb 13.3 oz (97.9 kg)   05/21/20 2207 -- -- -- 90 -- -- -- --   05/21/20 2200 (!) 103/53 97 °F (36.1 °C) Temporal 91 12 99 % -- --   05/21/20 2100 -- -- -- 100 20 93 % -- --          Intake/Output Summary (Last 24 hours) at 5/22/2020 1211  Last data filed at 5/22/2020 1100  Gross per 24 hour   Intake 967 ml   Output 695 ml   Net 272 ml       Wt Readings from Last 3 Encounters:   05/22/20 215 lb 13.3 oz (97.9 kg)   05/13/20 220 lb 10.9 oz (100.1 kg)   04/11/20 238 lb 3.2 oz (108 kg)       Constitutional:  Patient in no acute distress  Head: normocephalic, atraumatic  Cardiovascular: regular rate and rhythm, no murmurs, gallops, or rubs  Respiratory:  Clear, no rales, rhochi, or wheezes  Gastrointestinal: soft, nontender, nondistended  Ext:   Neuro:   Skin: dry, no rash      DATA:      Recent Labs     05/21/20  0654 05/21/20  1516 05/21/20  2340 05/22/20  0630   WBC 18.3*  --  14.1* 13.4*   HGB 5.6* 7.2* 6.0* 7.0*   HCT 17.8* 21.8* 18.2* 20.9*   MCV 97.3  --  91.5 91.3     --  176 168     Recent Labs     05/20/20  0802 05/21/20  2340 05/22/20  0630    141 142   K 4.7 4.5 4.3    107 107   CO2 27 26 25   BUN 51* 52* 54*   CREATININE 2.9* 3.0* 3.1*   LABGLOM 16 15 15   GLUCOSE 175* 111* 135*   CALCIUM 7.6* 7.4* 7.4*     Recent Labs     05/20/20  0802 05/21/20 2340 05/22/20  0630   ALT 16 37* 36*     Lab Results   Component Value Date    LABALBU 1.5 (L) 05/22/2020    LABALBU 1.5 (L) 05/21/2020    LABALBU 1.6 (L) 05/20/2020       Iron studies:  Lab Results   Component Value Date    FERRITIN 1,491 05/21/2020    IRON 38 05/04/2020    TIBC 69 (L) 05/04/2020     Vitamin B-12   Date Value Ref Range Status   05/04/2020 546 211 - 946 pg/mL Final     Folate   Date Value Ref Range Status   05/04/2020 2.6 (L) 4.8 - 24.2 ng/mL Final       Bone disease:  Lab Results   Component Value Date    MG 2.0 05/22/2020    PHOS 3.4 05/22/2020     Vit D, 25-Hydroxy   Date Value Ref Range Status   10/12/2011 13 (L) 30 - 80 ng/mL Final     Comment:     REFERENCE INTERVAL- Vitamin D, 25-Hydroxy    This assay accurately quantifies the sum of vitamin D3,  25-hydroxy and vitamin D2, 25-hydroxy.     0-17 years-  Deficiency- less than 20 ng/mL  Optimum level- greater than or equal to 20 ng/mL*  *(Papito CL et al. Pediatrics 2008- 122- 1142-52.)    18 years and older-  Deficiency- Less than 20 ng/mL  Insufficiency- 20-29 ng/mL  Optimum Level- 30-80 ng/mL  Possible Toxicity- Greater than 150 ng/mL     PTH   Date Value Ref Range Status   05/05/2020 256 (H) 15 - 65 pg/mL Final       No components found for: URIC    Lab Results   Component Value Date    COLORU Yellow 05/04/2020    NITRU Negative 05/04/2020    GLUCOSEU Negative 05/04/2020    GLUCOSEU NEGATIVE 08/17/2011    KETUA Negative 05/04/2020    UROBILINOGEN 0.2 05/04/2020    BILIRUBINUR Negative 05/04/2020 BILIRUBINUR NEGATIVE 08/17/2011       No results found for: LIPIDPAN        IMPRESSION/RECOMMENDATIONS:      1. CKD 4/5  Baseline 4.2, eGFR 10-12 ml/min in 2/2020, 3.1 in 8/2019 4/11/20 JOSUÉ showed no hydronephrosis  Worsening renal function with oliguria  IHD initiated 5/10  Treatment planned for today     2. Sacral wound infection  S/p bedside sharp excisional debridement of sacral pressure ulcer on 05/03  Wound culture showing mixed gram-positive organisms, gram-negative rods, Proteus species. ID following     3. Anemia  of CKD with superimposed acute blood loss due to GI bleed  EGD 5 /8 due to GIB, showed antral ulcer  GDA embolization by IR 5/9  For egd again      4. Secondary hyperparathyrodism of renal origin  Follow closely on vit d analogue due to sarcoid and h/o stones  On rocaltrol, phoslo     5. Hyperkalemia with CKD  Improved with dailysis  Will follow    6.  resp failure  covid pos  Per id   Kehinde Gates

## 2020-05-22 NOTE — PROGRESS NOTES
Nutrition Assessment    Type and Reason for Visit: Initial, Positive Nutrition Screen, Consult    Nutrition Recommendations: ADAT, To order ONS as able    Nutrition Assessment: Pt severely malnourished now adm w/ GIB. Pt w/ multiple wounds s/p recent debridements pending possible diverting colostomy. Will monitor for nutrition progression    Malnutrition Assessment:  · Malnutrition Status: Meets the criteria for severe malnutrition  · Context: Chronic illness  · Findings of the 6 clinical characteristics of malnutrition (Minimum of 2 out of 6 clinical characteristics is required to make the diagnosis of moderate or severe Protein Calorie Malnutrition based on AND/ASPEN Guidelines):  1. Energy Intake-Less than or equal to 75% of estimated energy requirement, Greater than or equal to 3 months    2. Weight Loss-20% loss or greater, in 1 year  3. Fat Loss-Unable to assess(d/t lack of PPE)    4. Muscle Loss-Unable to assess    5. Fluid Accumulation-No significant fluid accumulation    6.  Strength-Not measured    Nutrition Risk Level:  Moderate    Nutrient Needs:  · Estimated Daily Total Kcal: (MSJ 1439 x 1.3 SF)  · Estimated Daily Protein (g): 105-120(2.2-2.5 gm/kg IBW)  · Estimated Daily Total Fluid (ml/day): per renal management    Nutrition Diagnosis:   · Problem: Severe malnutrition, In context of chronic illness  · Etiology: related to Catabolic illness(ESRD)     Signs and symptoms:  as evidenced by Diet history of poor intake, Weight loss greater than or equal to 20% in 1 year    Objective Information:  · Nutrition-Focused Physical Findings: pt alert, soft abd, active BS, diarrhea, generalized +1 edema, +I/Os  · Wound Type: Multiple, Pressure Ulcer, Stage IV, Deep Tissue Injury, Open Wounds  · Current Nutrition Therapies:  · Oral Diet Orders: NPO   · Anthropometric Measures:  · Ht: 5' 1\" (154.9 cm)   · Current Body Wt: 215 lb (97.5 kg)(bed scale 5/22)  · Admission Body Wt: 215 lb (97.5 kg)(bed scale 5/21)  · Usual Body Wt: 276 lb (125.2 kg)(actual per EMR 05/2019)  · % Weight Change:   22.1% wt loss x1 year per EMR  · Ideal Body Wt: 105 lb (47.6 kg), % Ideal Body 205%  · BMI Classification: BMI > or equal to 40.0 Obese Class III    Nutrition Interventions:   Continued Inpatient Monitoring, Education not appropriate at this time    Nutrition Evaluation:   · Evaluation: Goals set   · Goals: Nutrition progression    · Monitoring: Nutrition Progression, Skin Integrity, Wound Healing, I&O, Weight, Pertinent Labs, Monitor Bowel Function      Electronically signed by Arthur Muñiz MS, RD, LD on 5/22/20 at 12:57 PM EDT    Contact Number: [phone]

## 2020-05-22 NOTE — CONSULTS
[facility] CONSULT NOTE    Reason for Consult: Ongoing antibiotic treatment for infected sacral pressure ulcer  Requesting Physician: Dr. Mariana Everett     Chief complaint: Melena    History Obtained From: EMR and patient    HISTORY OFPRESENT ILLNESS              The patient is a 78 y.o. female with history of DM, hypertension, CKD, sarcoidosis, previously admitted on 05/03 for infected sacral pressure s/p excisional debridement on 05/04 with wound culture showing MRSA and tissue culture growing Proteus mirabilis, Enterococcus avium, anaerobic beta-lactamase negative Gram-negative rods, Candida albicans and incidentally found to test positive for COVID-19 and with hospital course complicated by GI bleeding from antral and pyloric ulcers s/p gastroduodenal artery embolization on 05/09, discharge to [address] [place] [address],[address] on 05/13 to continue piperacillin-tazobactam, vancomycin and fluconazole until 06/14 with plan for diverting colostomy when she tests negative for COVID-19, transferred back to [facility] on 05/21 for recurrent melena. On transfer, she has been afebrile and hemodynamically stable with leukocytosis up to 18,000 and hemoglobinemia of 5.6. She remains positive for COVID-19. Piperacillin-tazobactam and fluconazole were resumed. ID service was subsequently consulted for further recommendations.     Past Medical History  Past Medical History:   Diagnosis Date    Arthritis     knees    Chronic knee pain     Depression     Diabetes mellitus ([address]     type 2    Gall stones     Hypertension     Kidney disease     Kidney stones     Obesity     Sarcoidosis     SOBOE 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       Current Facility-Administered Medications   Medication Dose Route Frequency Provider Last Rate Last Dose    miconazole (MICOTIN) 2 % powder   Topical BID Nayely Amezcua MD        collagenase ointment   Topical Daily Asif Dyson MD        sucralfate (CARAFATE) tablet 1 g  1 g Oral 4 times per day Tyrone Johnson MD   1 g at 05/22/20 1113    pantoprazole (PROTONIX) 80 mg in sodium chloride 0.9 % 100 mL infusion  8 mg/hr Intravenous Continuous Tyrone Johnson MD 10 mL/hr at 05/22/20 1211 8 mg/hr at 05/22/20 1211    [START ON 5/23/2020] gabapentin (NEURONTIN) capsule 300 mg  300 mg Oral Daily Tyrone Johnson MD        epoetin derek-epbx (RETACRIT) injection 2,000 Units  2,000 Units Subcutaneous Once Nagi Dye DO        And    epoetin derek-epbx (RETACRIT) injection 3,000 Units  3,000 Units Subcutaneous Once Nagi Dye DO        sodium chloride flush 0.9 % injection 10 mL  10 mL Intravenous 2 times per day Lobo Loya MD   10 mL at 05/22/20 0836    sodium chloride flush 0.9 % injection 10 mL  10 mL Intravenous PRN Lobo Loya MD        polyethylene glycol (GLYCOLAX) packet 17 g  17 g Oral Daily PRN Lobo Loya MD        calcitRIOL (ROCALTROL) capsule 0.25 mcg  0.25 mcg Oral Daily Lobo Loya MD   0.25 mcg at 05/22/20 0836    hydrOXYzine (VISTARIL) capsule 25 mg  25 mg Oral TID PRN Lobo Loya MD        fluconazole (DIFLUCAN) tablet 200 mg  200 mg Oral Daily Lobo Loya MD   200 mg at 05/22/20 0836    glucose (GLUTOSE) 40 % oral gel 15 g  15 g Oral PRN Lobo Loya MD        dextrose 50 % IV solution  12.5 g Intravenous PRN Lobo Loya MD        glucagon (rDNA) injection 1 mg  1 mg Intramuscular PRN Lobo Loya MD        dextrose 5 % solution  100 mL/hr Intravenous PRN Lobo Loya MD        acetaminophen (TYLENOL) tablet 650 mg  650 mg Oral Q6H PRN Lobo Loya MD        Or    acetaminophen (TYLENOL) suppository 650 mg  650 mg Rectal Q6H PRN Lobo Loya MD        piperacillin-tazobactam (ZOSYN) 3.375 g in dextrose 5 % 100 mL IVPB extended infusion (mini-bag)  3.375 g Intravenous Q12H Lobo Loya MD 25 mL/hr at 05/22/20 1113 3.375 g at 05/22/20 1113    oxyCODONE-acetaminophen (PERCOCET) 5-325 MG per tablet 1 tablet  1 tablet Oral Q6H PRN Ann Marie Pham MD   1 tablet at 20 0936    vitamin C (ASCORBIC ACID) tablet 500 mg  500 mg Oral Daily Ann Marie Pham MD   500 mg at 20 4104    zinc sulfate (ZINCATE) capsule 100 mg  100 mg Oral Daily Ann Marie Pham MD   100 mg at 20 0836    0.9 % sodium chloride infusion admixture   Intravenous Q12H Ann Marie Pham MD           No Known Allergies    Surgical History  Past Surgical History:   Procedure Laterality Date    ABDOMEN SURGERY N/A 2020    SACRAL WOUND DEBRIDEMENT CALL   WITH TIME AVAIL AM performed by Odalys Khan MD at [address] [place], [place] [address]   [address] [place] [address]  3/31/16    Laparoscopic-Dr. Guru Cr    CYSTOSCOPY       for kidney stones    FOOT SURGERY       right     UPPER GASTROINTESTINAL ENDOSCOPY  2.18.15    Dr. Jose Oneal Findings: Mild Gastrits and Duodenitis, 2cm Hiatal Hernia    UPPER GASTROINTESTINAL ENDOSCOPY N/A 2020    EGD CONTROL HEMORRHAGE performed by Odalys Khan MD at [address] [place] [address] History     Socioeconomic History    Marital status:    Tobacco Use    Smoking status: Former Smoker     Packs/day: 1.00     Years: 30.00     Pack years: 30.00     Last attempt to quit: 2011     Years since quittin.7    Smokeless tobacco: Never Used   Substance and Sexual Activity    Alcohol use: No    Drug use: No       Family Medical History  Family History   Problem Relation Age of Onset    Cancer Sister        Review of Systems:  Constitutional: No fever, no chills  Eyes: No vision changes, no retroorbital pain  ENT: No hearing changes, no ear pain  Respiratory: No cough, no dyspnea  Cardiovascular: No chest pain, no palpitations  Gastrointestinal: No abdominal pain, no diarrhea  Genitourinary: No dysuria, no hematuria  Integumentary: No rash, no itching  Musculoskeletal: Has back pain associated with wound, no joint pain  Neurologic: No headache, no numbness in extremities    Physical Examination:  Vitals:    05/22/20 0900 05/22/20 0943 05/22/20 1000 05/22/20 1100   BP: 135/69  (!) 103/54 120/70   Pulse: 91 94 92 92   Resp: 13  26 15   Temp:       TempSrc:       SpO2: 99%  100% 98%   Weight:       Height:         Constitutional: Alert, not in distress  Eyes: Sclerae anicteric, no conjunctival erythema  ENT: No buccal lesion, no pharyngeal exudates  Neck: No nuchal rigidity, no cervical adenopathy  Lungs: Clear breath sounds, no crackles, no wheezes  Heart: Regular rate and rhythm, no murmurs  Abdomen: Bowel sounds present, soft, nontender  Skin: Warm and dry, no active dermatoses  Musculoskeletal: Sacrococcygeal wound as follow:                 Labs, imaging, and medical records/notes were personally reviewed. Assessment:  Infected sacral pressure ulcer s/p bedside debridement on 05/03, excisional debridement on 05/04. Wound culture grew MRSA. Tissue culture grew Enterococcus avium, Proteus mirabilis, Candida albicans. SARS-CoV-2 infection    Plan:  Continue piperacillin-tazobactam 3.375g q12h and fluconazole 200 mg daily dosed according to renal function, and vancomycin dosed according to trough per Pharmacy for 6 weeks until 06/14 as previously planned. Continue local wound care. Follow up plan for possible diverting colostomy after repeat COVID-19 testing becomes negative. Thank you for involving me in the care of [employer]. I will continue to follow. Please do not hesitate to call for any questions or concerns.     Electronically signed by Miriam Jacobs MD on 5/22/2020 at 1:14 PM

## 2020-05-23 LAB
ALBUMIN SERPL-MCNC: 1.5 G/DL (ref 3.5–5.2)
ALP BLD-CCNC: 83 U/L (ref 35–104)
ALT SERPL-CCNC: 36 U/L (ref 0–32)
ANION GAP SERPL CALCULATED.3IONS-SCNC: 13 MMOL/L (ref 7–16)
AST SERPL-CCNC: 39 U/L (ref 0–31)
BASOPHILS ABSOLUTE: 0.05 E9/L (ref 0–0.2)
BASOPHILS RELATIVE PERCENT: 0.4 % (ref 0–2)
BILIRUB SERPL-MCNC: 0.3 MG/DL (ref 0–1.2)
BUN BLDV-MCNC: 49 MG/DL (ref 8–23)
CALCIUM SERPL-MCNC: 7.4 MG/DL (ref 8.6–10.2)
CHLORIDE BLD-SCNC: 104 MMOL/L (ref 98–107)
CO2: 23 MMOL/L (ref 22–29)
CREAT SERPL-MCNC: 3.1 MG/DL (ref 0.5–1)
EOSINOPHILS ABSOLUTE: 0.42 E9/L (ref 0.05–0.5)
EOSINOPHILS RELATIVE PERCENT: 3.7 % (ref 0–6)
GFR AFRICAN AMERICAN: 18
GFR NON-AFRICAN AMERICAN: 15 ML/MIN/1.73
GLUCOSE BLD-MCNC: 184 MG/DL (ref 74–99)
HCT VFR BLD CALC: 24.6 % (ref 34–48)
HCT VFR BLD CALC: 25.1 % (ref 34–48)
HCT VFR BLD CALC: 26.2 % (ref 34–48)
HCT VFR BLD CALC: 27 % (ref 34–48)
HEMOGLOBIN: 8 G/DL (ref 11.5–15.5)
HEMOGLOBIN: 8.1 G/DL (ref 11.5–15.5)
HEMOGLOBIN: 8.5 G/DL (ref 11.5–15.5)
HEMOGLOBIN: 8.8 G/DL (ref 11.5–15.5)
IMMATURE GRANULOCYTES #: 0.44 E9/L
IMMATURE GRANULOCYTES %: 3.9 % (ref 0–5)
INR BLD: 1
LYMPHOCYTES ABSOLUTE: 1.08 E9/L (ref 1.5–4)
LYMPHOCYTES RELATIVE PERCENT: 9.6 % (ref 20–42)
MAGNESIUM: 2 MG/DL (ref 1.6–2.6)
MCH RBC QN AUTO: 29.3 PG (ref 26–35)
MCHC RBC AUTO-ENTMCNC: 31.9 % (ref 32–34.5)
MCV RBC AUTO: 91.9 FL (ref 80–99.9)
METER GLUCOSE: 146 MG/DL (ref 74–99)
METER GLUCOSE: 254 MG/DL (ref 74–99)
METER GLUCOSE: 261 MG/DL (ref 74–99)
MONOCYTES ABSOLUTE: 0.94 E9/L (ref 0.1–0.95)
MONOCYTES RELATIVE PERCENT: 8.4 % (ref 2–12)
MRSA CULTURE ONLY: NORMAL
NEUTROPHILS ABSOLUTE: 8.3 E9/L (ref 1.8–7.3)
NEUTROPHILS RELATIVE PERCENT: 74 % (ref 43–80)
PDW BLD-RTO: 17 FL (ref 11.5–15)
PHOSPHORUS: 4 MG/DL (ref 2.5–4.5)
PLATELET # BLD: 170 E9/L (ref 130–450)
PMV BLD AUTO: 10.7 FL (ref 7–12)
POTASSIUM SERPL-SCNC: 3.9 MMOL/L (ref 3.5–5)
PROTHROMBIN TIME: 11.3 SEC (ref 9.3–12.4)
RBC # BLD: 2.73 E12/L (ref 3.5–5.5)
SODIUM BLD-SCNC: 140 MMOL/L (ref 132–146)
TOTAL PROTEIN: 4.5 G/DL (ref 6.4–8.3)
VANCOMYCIN RANDOM: 31.6 MCG/ML (ref 5–40)
WBC # BLD: 11.2 E9/L (ref 4.5–11.5)

## 2020-05-23 PROCEDURE — 6370000000 HC RX 637 (ALT 250 FOR IP): Performed by: INTERNAL MEDICINE

## 2020-05-23 PROCEDURE — 6360000002 HC RX W HCPCS: Performed by: INTERNAL MEDICINE

## 2020-05-23 PROCEDURE — 2500000003 HC RX 250 WO HCPCS

## 2020-05-23 PROCEDURE — 2580000003 HC RX 258: Performed by: INTERNAL MEDICINE

## 2020-05-23 PROCEDURE — 36415 COLL VENOUS BLD VENIPUNCTURE: CPT

## 2020-05-23 PROCEDURE — 85018 HEMOGLOBIN: CPT

## 2020-05-23 PROCEDURE — C9113 INJ PANTOPRAZOLE SODIUM, VIA: HCPCS | Performed by: STUDENT IN AN ORGANIZED HEALTH CARE EDUCATION/TRAINING PROGRAM

## 2020-05-23 PROCEDURE — 6370000000 HC RX 637 (ALT 250 FOR IP): Performed by: STUDENT IN AN ORGANIZED HEALTH CARE EDUCATION/TRAINING PROGRAM

## 2020-05-23 PROCEDURE — 2140000000 HC CCU INTERMEDIATE R&B

## 2020-05-23 PROCEDURE — 85025 COMPLETE CBC W/AUTO DIFF WBC: CPT

## 2020-05-23 PROCEDURE — 90935 HEMODIALYSIS ONE EVALUATION: CPT

## 2020-05-23 PROCEDURE — 2700000000 HC OXYGEN THERAPY PER DAY

## 2020-05-23 PROCEDURE — 99232 SBSQ HOSP IP/OBS MODERATE 35: CPT | Performed by: FAMILY MEDICINE

## 2020-05-23 PROCEDURE — 6360000002 HC RX W HCPCS: Performed by: STUDENT IN AN ORGANIZED HEALTH CARE EDUCATION/TRAINING PROGRAM

## 2020-05-23 PROCEDURE — 84100 ASSAY OF PHOSPHORUS: CPT

## 2020-05-23 PROCEDURE — 36592 COLLECT BLOOD FROM PICC: CPT

## 2020-05-23 PROCEDURE — 80053 COMPREHEN METABOLIC PANEL: CPT

## 2020-05-23 PROCEDURE — C9113 INJ PANTOPRAZOLE SODIUM, VIA: HCPCS | Performed by: INTERNAL MEDICINE

## 2020-05-23 PROCEDURE — 85610 PROTHROMBIN TIME: CPT

## 2020-05-23 PROCEDURE — 80202 ASSAY OF VANCOMYCIN: CPT

## 2020-05-23 PROCEDURE — 99233 SBSQ HOSP IP/OBS HIGH 50: CPT | Performed by: INTERNAL MEDICINE

## 2020-05-23 PROCEDURE — 82962 GLUCOSE BLOOD TEST: CPT

## 2020-05-23 PROCEDURE — 85014 HEMATOCRIT: CPT

## 2020-05-23 PROCEDURE — 83735 ASSAY OF MAGNESIUM: CPT

## 2020-05-23 RX ORDER — ANTICOAGULANT SODIUM CITRATE SOLUTION 4 G/100ML
SOLUTION INTRAVENOUS
Status: COMPLETED
Start: 2020-05-23 | End: 2020-05-23

## 2020-05-23 RX ORDER — ANTICOAGULANT SODIUM CITRATE SOLUTION 4 G/100ML
4 SOLUTION INTRAVENOUS PRN
Status: DISCONTINUED | OUTPATIENT
Start: 2020-05-23 | End: 2020-05-27 | Stop reason: HOSPADM

## 2020-05-23 RX ADMIN — SUCRALFATE 1 G: 1 TABLET ORAL at 01:18

## 2020-05-23 RX ADMIN — PANTOPRAZOLE SODIUM 40 MG: 40 INJECTION, POWDER, FOR SOLUTION INTRAVENOUS at 21:33

## 2020-05-23 RX ADMIN — MICONAZOLE NITRATE: 20.6 POWDER TOPICAL at 11:46

## 2020-05-23 RX ADMIN — OXYCODONE HYDROCHLORIDE AND ACETAMINOPHEN 1 TABLET: 5; 325 TABLET ORAL at 13:22

## 2020-05-23 RX ADMIN — OXYCODONE HYDROCHLORIDE AND ACETAMINOPHEN 1 TABLET: 5; 325 TABLET ORAL at 21:41

## 2020-05-23 RX ADMIN — SODIUM CHLORIDE, PRESERVATIVE FREE 10 ML: 5 INJECTION INTRAVENOUS at 04:36

## 2020-05-23 RX ADMIN — Medication 100 MG: at 11:37

## 2020-05-23 RX ADMIN — PANTOPRAZOLE SODIUM 40 MG: 40 INJECTION, POWDER, FOR SOLUTION INTRAVENOUS at 11:37

## 2020-05-23 RX ADMIN — SODIUM CHLORIDE, PRESERVATIVE FREE 10 ML: 5 INJECTION INTRAVENOUS at 18:16

## 2020-05-23 RX ADMIN — FLUCONAZOLE 200 MG: 100 TABLET ORAL at 11:37

## 2020-05-23 RX ADMIN — SUCRALFATE 1 G: 1 TABLET ORAL at 05:53

## 2020-05-23 RX ADMIN — CALCITRIOL 0.25 MCG: 0.25 CAPSULE ORAL at 11:37

## 2020-05-23 RX ADMIN — SUCRALFATE 1 G: 1 TABLET ORAL at 18:16

## 2020-05-23 RX ADMIN — ANTICOAGULANT SODIUM CITRATE SOLUTION: 4 SOLUTION INTRAVENOUS at 11:36

## 2020-05-23 RX ADMIN — OXYCODONE HYDROCHLORIDE AND ACETAMINOPHEN 1 TABLET: 5; 325 TABLET ORAL at 06:54

## 2020-05-23 RX ADMIN — SUCRALFATE 1 G: 1 TABLET ORAL at 12:10

## 2020-05-23 RX ADMIN — COLLAGENASE SANTYL: 250 OINTMENT TOPICAL at 12:11

## 2020-05-23 RX ADMIN — PIPERACILLIN AND TAZOBACTAM 3.38 G: 3; .375 INJECTION, POWDER, FOR SOLUTION INTRAVENOUS at 11:59

## 2020-05-23 RX ADMIN — GABAPENTIN 300 MG: 300 CAPSULE ORAL at 11:37

## 2020-05-23 RX ADMIN — Medication 500 MG: at 11:38

## 2020-05-23 RX ADMIN — SODIUM CHLORIDE 25 ML: 9 INJECTION, SOLUTION INTRAVENOUS at 15:20

## 2020-05-23 RX ADMIN — OXYCODONE HYDROCHLORIDE AND ACETAMINOPHEN 1 TABLET: 5; 325 TABLET ORAL at 01:17

## 2020-05-23 RX ADMIN — SODIUM CHLORIDE, PRESERVATIVE FREE 10 ML: 5 INJECTION INTRAVENOUS at 15:21

## 2020-05-23 RX ADMIN — PIPERACILLIN AND TAZOBACTAM 3.38 G: 3; .375 INJECTION, POWDER, FOR SOLUTION INTRAVENOUS at 21:33

## 2020-05-23 ASSESSMENT — PAIN SCALES - GENERAL
PAINLEVEL_OUTOF10: 8
PAINLEVEL_OUTOF10: 5
PAINLEVEL_OUTOF10: 5
PAINLEVEL_OUTOF10: 0
PAINLEVEL_OUTOF10: 0
PAINLEVEL_OUTOF10: 5
PAINLEVEL_OUTOF10: 6
PAINLEVEL_OUTOF10: 6
PAINLEVEL_OUTOF10: 0

## 2020-05-23 ASSESSMENT — PAIN DESCRIPTION - PAIN TYPE
TYPE: CHRONIC PAIN
TYPE: CHRONIC PAIN

## 2020-05-23 ASSESSMENT — PAIN DESCRIPTION - PROGRESSION
CLINICAL_PROGRESSION: NOT CHANGED

## 2020-05-23 ASSESSMENT — PAIN DESCRIPTION - DESCRIPTORS
DESCRIPTORS: ACHING
DESCRIPTORS: ACHING

## 2020-05-23 ASSESSMENT — PAIN DESCRIPTION - LOCATION
LOCATION: BUTTOCKS
LOCATION: BUTTOCKS

## 2020-05-23 ASSESSMENT — PAIN DESCRIPTION - FREQUENCY
FREQUENCY: INTERMITTENT
FREQUENCY: INTERMITTENT

## 2020-05-23 ASSESSMENT — PAIN DESCRIPTION - ORIENTATION
ORIENTATION: RIGHT;LEFT
ORIENTATION: RIGHT;LEFT

## 2020-05-23 ASSESSMENT — PAIN DESCRIPTION - ONSET
ONSET: ON-GOING
ONSET: ON-GOING

## 2020-05-23 NOTE — PROGRESS NOTES
Admit Date: 5/21/2020       Subjective:  No chief complaint on file. GI Bleed  covid +      Pt transferred from ICU. Called by nurse regarding  Non functioning PICC line  Problems :    1.  GI bleed  hgb 6.9 today 7.6 at midnight 8.8 at 6 pm  Will need transfused   PPI carafate liq diet   No source idnetified    2.  covid + zinc and vit c    3.   Decubitus ulcer   - S/p debridement on 5/4   - Wound cx- Enterococcus, Candida, Proteus   - On Vancomycin, Zosyn and Fluconazole    4.  dm2   Decreased po intake   No coverage for now 220 today    5.  ckd   - Cr-2.9 on admission 31/2.5 today   - On HD    - BMP daily    - Nephrology following      6.  htn   92/50   On no meds now         Objective:    Scheduled Meds:  Current Facility-Administered Medications   Medication Dose Route Frequency Provider Last Rate Last Dose    HYDROmorphone (DILAUDID) injection 0.5 mg  0.5 mg Intravenous Q12H PRN Paige Marte DO        0.9 % sodium chloride bolus  20 mL Intravenous Once Erika Wood MD        DAPTOmycin (CUBICIN) 380 mg in sodium chloride 0.9 % 50 mL IVPB  8 mg/kg (Ideal) Intravenous Q48H PABLO Drake - CNS        anticoagulant sodium citrate 4 GM/100ML solution 0.16 g  4 mL Intracatheter PRN Dhanunjay Lindsey Litten,         miconazole (MICOTIN) 2 % powder   Topical BID Dhanunjay Lindsey Litten,         collagenase ointment   Topical Daily Dhanunjay Lindsey Litten, DO        sucralfate (CARAFATE) tablet 1 g  1 g Oral 4 times per day Leopoldo Broccoli, DO   1 g at 05/24/20 7585    gabapentin (NEURONTIN) capsule 300 mg  300 mg Oral Daily Quincy Moran, DO   300 mg at 05/23/20 1137    pantoprazole (PROTONIX) injection 40 mg  40 mg Intravenous BID Quincy Moran, DO   40 mg at 05/23/20 2133    sodium chloride flush 0.9 % injection 10 mL  10 mL Intravenous 2 times per day Leopoldo Broccoli, DO   10 mL at 05/22/20 2124    sodium chloride flush 0.9 % injection 10 mL  10 mL Intravenous PRN Quincy Vinh Dean, DO   10 mL at 05/23/20 1816    polyethylene glycol (GLYCOLAX) packet 17 g  17 g Oral Daily PRN Quincy Pulido Litten, DO        calcitRIOL (ROCALTROL) capsule 0.25 mcg  0.25 mcg Oral Daily Quincy Pulido Litten, DO   0.25 mcg at 05/23/20 1137    hydrOXYzine (VISTARIL) capsule 25 mg  25 mg Oral TID PRN Quincy Pulido Litten, DO        fluconazole (DIFLUCAN) tablet 200 mg  200 mg Oral Daily [facility]en[facility] Dean, DO   200 mg at 05/23/20 1137    glucose (GLUTOSE) 40 % oral gel 15 g  15 g Oral PRN Quincy Pulido Litten, DO        dextrose 50 % IV solution  12.5 g Intravenous PRN Dhanunjay Lindsey Litten, DO        glucagon (rDNA) injection 1 mg  1 mg Intramuscular PRN Dhanunjay Lindsey Litten, DO        dextrose 5 % solution  100 mL/hr Intravenous PRN Quincy Parsonsten, DO        acetaminophen (TYLENOL) tablet 650 mg  650 mg Oral Q6H PRN Dhanunjay Lindsey Litten, DO        Or    acetaminophen (TYLENOL) suppository 650 mg  650 mg Rectal Q6H PRN Quincy Pulido Litissac, DO        piperacillin-tazobactam (ZOSYN) 3.375 g in dextrose 5 % 100 mL IVPB extended infusion (mini-bag)  3.375 g Intravenous Q12H Dhanunjay Lindsey Litten, DO   Stopped at 05/24/20 0400    oxyCODONE-acetaminophen (PERCOCET) 5-325 MG per tablet 1 tablet  1 tablet Oral Q6H PRN Dhanunjay Lindsey Litten, DO   1 tablet at 05/23/20 2141    vitamin C (ASCORBIC ACID) tablet 500 mg  500 mg Oral Daily [facility] Dean, DO   500 mg at 05/23/20 1138    zinc sulfate (ZINCATE) capsule 100 mg  100 mg Oral Daily Dhanunjay Vinh Boyjeffreyla, DO   100 mg at 05/23/20 1137    0.9 % sodium chloride infusion admixture   Intravenous Q12H Dhanunjay Lindsey Litten, DO   Stopped at 05/24/20 0634       Continuous Infusions  :   dextrose         PRN Meds:  HYDROmorphone, anticoagulant sodium citrate, sodium chloride flush, polyethylene glycol, hydrOXYzine, glucose, dextrose, glucagon (rDNA), dextrose, acetaminophen **OR** acetaminophen, oxyCODONE-acetaminophen      Wt Readings from Last 3 Encounters:   05/24/20 215 lb 12.8 oz (97.9 kg)   05/13/20 220 lb 10.9 oz (100.1 kg)   04/11/20 238 lb 3.2 oz (108 kg)     I/O last 3 completed shifts: In: 615 [P.O.:160; I.V.:55; IV Piggyback:100]  Out: 1835 [Urine:335]    Intake/Output Summary (Last 24 hours) at 5/24/2020 0937  Last data filed at 5/24/2020 0630  Gross per 24 hour   Intake 615 ml   Output 1795 ml   Net -1180 ml       Vitals:    05/24/20 0630   BP: (!) 108/52   Pulse: 76   Resp: 16   Temp:    SpO2: 99%       Physical Exam:      Discussed with nurse . In covid room    CBC  Recent Labs     05/22/20  0630  05/23/20  0440  05/23/20  1815 05/24/20  0000 05/24/20  0600   WBC 13.4*  --  11.2  --   --   --  8.4   HGB 7.0*   < > 8.0*  8.1*   < > 8.8* 7.6* 6.9*   HCT 20.9*   < > 25.1*  24.6*   < > 27.0* 23.4* 21.3*   MCV 91.3  --  91.9  --   --   --  93.0     --  170  --   --   --  117*    < > = values in this interval not displayed. BMP  Recent Labs     05/22/20  0630 05/23/20  0440 05/24/20  0600    140 138   K 4.3 3.9 4.0    104 101   CO2 25 23 28   BUN 54* 49* 31*   CREATININE 3.1* 3.1* 2.5*   LABGLOM 15 15 19   CALCIUM 7.4* 7.4* 7.0*     LFT's  Recent Labs     05/22/20  0630 05/23/20  0440 05/24/20  0600   ALT 36* 36* 30     INR:   Recent Labs     05/22/20  0630 05/23/20  0440 05/24/20  0600   INR 1.0 1.0 1.0         Lab Results   Component Value Date    CRP 15.8 (H) 05/21/2020     Lab Results   Component Value Date    SEDRATE 45 (H) 08/13/2019     No results for input(s): POCGLU in the last 72 hours. Lipids: No results for input(s): CHOL, HDL in the last 72 hours.     Invalid input(s): LDLCALCU  ABGs:   Lab Results   Component Value Date    PO2ART 110.4 05/09/2020    NJQ9NLI 43.7 05/09/2020     SpO2 Readings from Last 1 Encounters:   05/24/20 99%       Last 3 Troponin:    Lab Results   Component Value Date    TROPONINI 0.11 05/21/2020    TROPONINI 0.12 04/11/2020    TROPONINI 0.19 02/01/2020     Lab Results   Component Value Date    CKTOTAL 25 (L) 08/16/2013    CKTOTAL 28 (L) 08/15/2013    CKTOTAL 622 (H) 06/26/2011    CKMB 1.0 08/15/2013    CKMB 3.9 06/26/2011    CKMB 3.0 06/26/2011    TROPONINI 0.11 (H) 05/21/2020    TROPONINI 0.12 (H) 04/11/2020    TROPONINI 0.19 (H) 02/01/2020        TSH:    Lab Results   Component Value Date    TSH 1.945 08/16/2013      Vent Information  SpO2: 99 %      U/A:     Lab Results   Component Value Date    COLORU Yellow 05/04/2020    PHUR 5.5 05/04/2020    WBCUA 10-20 05/04/2020    WBCUA 5-10 08/17/2011    RBCUA 0-1 05/04/2020    RBCUA 0-1 06/26/2013    BACTERIA MANY 05/04/2020    CLARITYU CLOUDY 05/04/2020    SPECGRAV 1.025 05/04/2020    LEUKOCYTESUR SMALL 05/04/2020    UROBILINOGEN 0.2 05/04/2020    BILIRUBINUR Negative 05/04/2020    BILIRUBINUR NEGATIVE 08/17/2011    BLOODU TRACE-INTACT 05/04/2020    GLUCOSEU Negative 05/04/2020    GLUCOSEU NEGATIVE 08/17/2011    AMORPHOUS FEW 06/25/2011          Iron studies:  Lab Results   Component Value Date    FERRITIN 1,491 05/21/2020     Bone disease:  Lab Results   Component Value Date     (H) 05/05/2020    MG 1.8 05/24/2020    PHOS 3.7 05/24/2020     Nutrition:No results found for: ALB           Assessment:  Patient Active Problem List   Diagnosis Code    Neurologic gait dysfunction R26.9    Renal failure, acute on chronic (HCC) N17.9, N18.9    Diabetes mellitus ([facility] E11.9    Hypertension I10    Arthritis M19.90    Kidney disease N28.9    Acute blood loss anemia D62    Knee problem M25.9    Anemia due to stage 3 chronic kidney disease (HCC) N18.3, D63.1    PERLA (acute kidney injury) ([address] N17.9    Primary osteoarthritis of both knees M17.0    Acute on chronic renal insufficiency N28.9, N18.9    Acute renal failure ([address] H36.7    Metabolic acidosis M51.7    Acute renal failure superimposed on chronic kidney disease, on chronic dialysis (HCC) N17.9, N18.9, Z99.2    Sepsis ([facility] A41.9    2019 novel coronavirus disease (COVID-19) U07.1    GI bleed K92.2    Severe protein-calorie malnutrition ([facility] E43     1. GI bleed  hgb 6.9 today 7.6 at midnight 8.8 at 6 pm   PPI carafate liq diet   No source idnetified    2. S/ embolization 5-9-20   Successful arteriograms of the celiac, SMA, common hepatic, and GDA   . No active extravasation was identified. Successful prophylactic embolization of the GDA.        2.  covid + zinc and vit c    3.   Decubitus ulcer -  large   - S/p debridement on 5/4   - Wound cx- Enterococcus, Candida, Proteus   - On Vancomycin, Zosyn and Fluconazole    4.  dm2   Decreased po intake   No coverage for now 220 today    5.  ckd   gfr 9   - Cr-2.9 on admission 31/2.5 today   - On HD    - BMP daily    - Nephrology following      6.  htn   92/50   On no meds now               Plan:    Transfuse    Akin Husain M.D.    5/24/2020

## 2020-05-23 NOTE — PROGRESS NOTES
GENERAL SURGERY  DAILY PROGRESS NOTE  5/23/2020    No chief complaint on file. Subjective:  Patient examined from the window due to pandemic conditions and COVID + patient. Per nursing, one large dark red BM, h/h stable- in fact improving. Bleeding scan stable. EGD without evidence of bleeding. Objective:  BP 95/64   Pulse 96   Temp 97.3 °F (36.3 °C)   Resp 12   Ht 5' 1\" (1.549 m)   Wt 230 lb 8 oz (104.6 kg)   SpO2 100%   BMI 43.55 kg/m²     Patient examined from the window due to pandemic conditions and COVID + patient. Assessment/Plan:  78 y.o. female anemia concern for GIB, likely old residual blood vs diverticular bleed    PPI/Carafarte  Monitor H/H  Okay for clears from general surgery       Electronically signed by Wanda Devi MD on 5/23/2020 at 8:35 AM    Attending note:  No indication for colonoscopy at this time. Cont current care.

## 2020-05-23 NOTE — PROGRESS NOTES
[address] [facility]   Department of Internal Medicine   Internal Medicine Residency  MICU Progress Note    Patient:  Tiki Sheriff 78 y.o. female   MRN: [id number]       Date of Service: 2020    Allergy: Patient has no known allergies. Subjective     Patient seen this am. Discussed with nurse. Post transfusion hg- 8.5 this am.    Objective     TEMPERATURE:  Current - Temp: 97.4 °F (36.3 °C); Max - Temp  Av °F (36.7 °C)  Min: 97.3 °F (36.3 °C)  Max: 98.9 °F (37.2 °C)  RESPIRATIONS RANGE: Resp  Av.4  Min: 10  Max: 19  PULSE RANGE: Pulse  Av.7  Min: 66  Max: 115  BLOOD PRESSURE RANGE:  Systolic (87IVZ), XGR:345 , Min:75 , DTJ:325   ; Diastolic (54AZD), CUU:67, Min:49, Max:81    PULSE OXIMETRY RANGE: SpO2  Av.5 %  Min: 94 %  Max: 100 %    I & O - 24hr:    Intake/Output Summary (Last 24 hours) at 2020 1256  Last data filed at 2020 1145  Gross per 24 hour   Intake 1716 ml   Output 2247 ml   Net -531 ml     I/O last 3 completed shifts: In: 1856 [P.O.:180; I.V.:1026; Blood:350]  Out: 865 [Urine:675] I/O this shift:  In: 300   Out: 1570 [Urine:70]   Weight change: 6 lb 13.3 oz (3.1 kg)    Physical Exam  Due to Covid-19 precautions physical exam was not performed. Chart was reviewed and case discussed with the nurse. There is no changes from previous physical exam noted on Consult note.      Medications     Continuous Infusions:   dextrose       Scheduled Meds:   miconazole   Topical BID    collagenase   Topical Daily    sucralfate  1 g Oral 4 times per day    gabapentin  300 mg Oral Daily    vancomycin (VANCOCIN) intermittent dosing (placeholder)   Other RX Placeholder    pantoprazole  40 mg Intravenous BID    sodium chloride flush  10 mL Intravenous 2 times per day    calcitRIOL  0.25 mcg Oral Daily    fluconazole  200 mg Oral Daily    piperacillin-tazobactam  3.375 g Intravenous Q12H    vitamin C  500 mg Oral Daily    zinc sulfate  100 mg Oral Daily    sodium chloride   Intravenous Q12H     PRN Meds: anticoagulant sodium citrate, sodium chloride flush, polyethylene glycol, hydrOXYzine, glucose, dextrose, glucagon (rDNA), dextrose, acetaminophen **OR** acetaminophen, oxyCODONE-acetaminophen      Labs and Imaging Studies     CBC:   Recent Labs     05/21/20  2340 05/22/20  0630  05/22/20  2133 05/23/20  0440 05/23/20  1220   WBC 14.1* 13.4*  --   --  11.2  --    HGB 6.0* 7.0*   < > 7.4* 8.0*  8.1* 8.5*   HCT 18.2* 20.9*   < > 22.8* 25.1*  24.6* 26.2*   MCV 91.5 91.3  --   --  91.9  --     168  --   --  170  --     < > = values in this interval not displayed.        BMP:    Recent Labs     05/21/20 2340 05/22/20  0630 05/23/20  0440    142 140   K 4.5 4.3 3.9    107 104   CO2 26 25 23   BUN 52* 54* 49*   CREATININE 3.0* 3.1* 3.1*   GLUCOSE 111* 135* 184*       LIVER PROFILE:   Recent Labs     05/21/20 2340 05/22/20  0630 05/23/20  0440   AST 67* 58* 39*   ALT 37* 36* 36*   BILITOT 0.3 0.3 0.3   ALKPHOS 119* 100 83       PT/INR:   Recent Labs     05/21/20 2340 05/22/20  0630 05/23/20  0440   PROTIME 11.5 11.4 11.3   INR 1.0 1.0 1.0       APTT:   Recent Labs     05/21/20  0654   APTT 24.6       Fasting Lipid Panel:    Lab Results   Component Value Date    CHOL 191 10/12/2011    TRIG 181 10/12/2011    HDL 37.0 10/12/2011       Cardiac Enzymes:    Lab Results   Component Value Date    CKTOTAL 25 (L) 08/16/2013    CKTOTAL 28 (L) 08/15/2013    CKTOTAL 622 (H) 06/26/2011    CKMB 1.0 08/15/2013    CKMB 3.9 06/26/2011    CKMB 3.0 06/26/2011    TROPONINI 0.11 (H) 05/21/2020    TROPONINI 0.12 (H) 04/11/2020    TROPONINI 0.19 (H) 02/01/2020       Notable Cultures:      Blood cultures   Blood Culture, Routine   Date Value Ref Range Status   05/03/2020 5 Days- no growth  Final     Respiratory cultures No results found for: RESPCULTURE   Gram Stain Result   Date Value Ref Range Status   05/03/2020 Refer to culture Gram stain for results  Final     Urine   Urine Culture, Routine   Date Value Ref Range Status   05/04/2020 <10,000 CFU/mL  Yeast   (A)  Final   05/04/2020 50,000 CFU/ml  Final     Legionella No results found for: LABLEGI  C Diff PCR No results found for: CDIFPCR  Wound culture/abscess:   Recent Labs     05/22/20  0200   WNDABS Moderate growth  Identification and sensitivity to follow    Moderate growth  Identification and sensitivity to follow       Tip culture:No results for input(s): CXCATHTIP in the last 72 hours. Antibiotic  Days  Day started                                Oxygen:     Vent Information  SpO2: 97 %  Additional Respiratory  Assessments  Pulse: 99  Resp: 14  SpO2: 97 %  Oral Care: Mouth moisturizer     Nasal cannula L/min     Face mask %     Reservoirs mask %       ABG   Vent Settings     PH   Mode      PCO2   TV      PO2   RR      HCO3   PS      Sat%   PEEP      FIO2   FIO2        P/F          Lines:  Site  Day  Date inserted     TLC              PICC              Arterial line              Peripheral line              HD cath            Urethral Catheter-Output (mL): 10 mL    Imaging Studies:      Resident's Assessment and Plan     Reginaldo Damon is a 78 y.o. female with PMH of DM2, HTN, CKD, obesity, Sarcoidosis, Anemia, large decubitus ulcer s/p debridement and GIB s/p embolization 5/9/20 who was transferred from [facility] for Acute GIB and Hg of 5.6.  Currently COVID (+)x3     <Neurologic>  - AAOx3     <Cardiovascular>  Ho Hypertension  - Was on hydralazine - held  - BP stable    <Pulmonary>  Covid PNA  - On room air  - Last Covid 5/14- (+); Repeat Covid test this admission positive   - IL-6 ordered  - Ferritin 1,491, D-Dimer 730, , Fibrinogen 520  - ID currently following  - OnVitamin C and Zinc supplementation  - CXR for tomorrow     <Gastrointestinal>  Acute on chronic GIB  - GIB on 5/8; EGD 5/9- D2 Ulcer and Antral Ulcer; Embolization on 5/9  - Hg stable until this morning- around 8  - total  units received since 5/22 - 3  - Transfusion goal Keep Hb>7  - PPI BID  - Gen Surgery following: EGD, scan showed no source of bleed; recommend to continue ppi bid and carafate; clear liguid diet  - Keep NPO for now     Transaminitis - improving  - ALT 37, AST 67  - Continue to monitor daily CMP     <Infectious Disease>  Decubitus Ulcer  - S/p debridement on 5/4  - Wound cx- Enterococcus, Candida, Proteus  - On Vancomycin, Zosyn and Fluconazole   - ID following  - Consulted  Wound care  - Previously ID and GSY wanted to do diverting colostomy, awaiting for Covid (-) x2     Leukocytosis  - On Vancomycin, Zosyn and Fluconazole to be continued until 6/14   - Panculture sent  - CXR for tomorrow   - Daily CBC     <Endocrine>  NIDDM  - Last A1C 5/18- 6.9  - Lantus 10 U nightly in [place]  - Currently NPO  - Will assess BGs and determine  Insulin requirements  - BG Q4H     <Genitourinary/Renal>  CKD  - Cr-2.9 on admission  - On HD   - BMP daily   - Nephrology following     <Hematology/Oncology>  Acute on Chronic Anemia  - likely 2/2 CKD, GIB  - Baseline hg- 8.5-9  - Received retacrit with HD  - H/H Q6H  - Transfuse if <7     DVT prophylaxis/ GI prophylaxis: hold for now due to GIB / protonix bid  Disposition: Transfer     Quincy Henriquez DO, PGY-1  Attending physician: Dr. Heather Hawkins  Department of Pulmonary, Critical Care and Sleep Medicine  [address] [facility]  Department of Internal Medicine      During multidisciplinary team rounds Trudy Sheridan is a 78 y.o. female was seen, examined and discussed. This is confirmation that I have personally seen and examined the patient and that the key elements of the encounter were performed by me (> 85 % time). The medications & laboratory data was discussed and adjusted where necessary. The radiographic images were reviewed or with radiologist or consultant if felt dis-concordant with the exam or history.  The above findings were corroborated, plans confirmed and changes made if needed. Family is updated at the bedside as available. Key issues of the case were discussed among consultants. Critical Care time is documented if appropriate.       Meseret Paz DO, FACP, FCCP, [place],

## 2020-05-23 NOTE — PROGRESS NOTES
Progress Note  5/23/2020 11:03 AM  Subjective:   Admit Date: 5/21/2020  PCP: Angelia Naik MD  Interval History: Patient seen  On dialysis     Diet: DIET CLEAR LIQUID;    Data:   Scheduled Meds:   miconazole   Topical BID    collagenase   Topical Daily    sucralfate  1 g Oral 4 times per day    gabapentin  300 mg Oral Daily    vancomycin (VANCOCIN) intermittent dosing (placeholder)   Other RX Placeholder    pantoprazole  40 mg Intravenous BID    sodium chloride flush  10 mL Intravenous 2 times per day    calcitRIOL  0.25 mcg Oral Daily    fluconazole  200 mg Oral Daily    piperacillin-tazobactam  3.375 g Intravenous Q12H    vitamin C  500 mg Oral Daily    zinc sulfate  100 mg Oral Daily    sodium chloride   Intravenous Q12H     Continuous Infusions:   dextrose       PRN Meds:sodium chloride flush, polyethylene glycol, hydrOXYzine, glucose, dextrose, glucagon (rDNA), dextrose, acetaminophen **OR** acetaminophen, oxyCODONE-acetaminophen  I/O last 3 completed shifts: In: 1856 [P.O.:180; I.V.:1026; Blood:350]  Out: 865 [Urine:675]  I/O this shift: In: 0   Out: 25 [Urine:25]    Intake/Output Summary (Last 24 hours) at 5/23/2020 1103  Last data filed at 5/23/2020 1000  Gross per 24 hour   Intake 1416 ml   Output 730 ml   Net 686 ml     CBC:   Recent Labs     05/21/20 2340 05/22/20  0630  05/22/20  1500 05/22/20  2133 05/23/20  0440   WBC 14.1* 13.4*  --   --   --  11.2   HGB 6.0* 7.0*   < > 7.6* 7.4* 8.0*  8.1*    168  --   --   --  170    < > = values in this interval not displayed.      BMP:    Recent Labs     05/21/20 2340 05/22/20  0630 05/23/20  0440    142 140   K 4.5 4.3 3.9    107 104   CO2 26 25 23   BUN 52* 54* 49*   CREATININE 3.0* 3.1* 3.1*   GLUCOSE 111* 135* 184*     Hepatic:   Recent Labs     05/21/20 2340 05/22/20  0630 05/23/20  0440   AST 67* 58* 39*   ALT 37* 36* 36*   BILITOT 0.3 0.3 0.3   ALKPHOS 119* 100 83     Troponin:   Recent Labs     05/21/20  1133 [facility] 0.11*     BNP: No results for input(s): BNP in the last 72 hours. Lipids: No results for input(s): CHOL, HDL in the last 72 hours. Invalid input(s): LDLCALCU  ABGs:   Lab Results   Component Value Date    PO2ART 110.4 2020    LKP6TON 43.7 2020     INR:   Recent Labs     20  2340 20  0630 20  0440   INR 1.0 1.0 1.0       -----------------------------------------------------------------  RAD: Xr Abdomen (kub) (single Ap View)    Result Date: 2020  Patient MRN:  [id number] : 1950 Age: 78 years Gender: Female Order Date:  2020 11:15 AM EXAM: XR ABDOMEN (KUB) (SINGLE AP VIEW) NUMBER OF IMAGES:  2 views INDICATION:  GI bleed Reason for Exam:->GI bleed Portable? ->Yes COMPARISON: None FINDINGS:  The bowel gas pattern demonstrates air with dilated loops of bowel. Most significant in the left side of the abdomen. There is evidence for previous embolization The findings suggest an obstruction. The obstruction is likely at the level of the small bowel. There is no evidence to suggest ascites or mass. Findings compatible with an obstruction. Nm Gi Blood Loss    Result Date: 2020  Patient MRN: [id number] : 1950 Age:  78 years Gender: Female Order Date: 2020 3:00 PM Exam: NM GI BLOOD LOSS Number of Images: 4 views Indication:   GIB GIB COMPARISON: None. FINDINGS: The patient was injected with 29 mCi of technetium UltraTag. No extravasation of contrast was observed in the gastrointestinal tract or elsewhere. No evidence of an active GI bleed.        Objective:   Vitals: BP (!) 82/59   Pulse 101   Temp 97.3 °F (36.3 °C)   Resp 14   Ht 5' 1\" (1.549 m)   Wt 230 lb 8 oz (104.6 kg)   SpO2 98%   BMI 43.55 kg/m²   General appearance: appears stated age   Skin:  No rashes or lesions      Assessment:   Patient Active Problem List:     Neurologic gait dysfunction     Renal failure, acute on chronic (HCC)     Diabetes mellitus ([facility]     Hypertension Arthritis     Kidney disease     Acute blood loss anemia     Knee problem     Anemia due to stage 3 chronic kidney disease (HCC)     PERLA (acute kidney injury) ([address]     Primary osteoarthritis of both knees     Acute on chronic renal insufficiency     Acute renal failure (HCC)     Metabolic acidosis     Acute renal failure superimposed on chronic kidney disease, on chronic dialysis ([address]     Sepsis ([address]     2019 novel coronavirus disease (COVID-19)     GI bleed     Severe protein-calorie malnutrition ([address]    Plan:   IMPRESSION/RECOMMENDATIONS:       1. CKD 4/5  Baseline 4.2, eGFR 10-12 ml/min in 2/2020, 3.1 in 8/2019 4/11/20 JOSUÉ showed no hydronephrosis  Worsening renal function with oliguria  IHD initiated 5/10  Treatment today      2. Sacral wound infection  S/p bedside sharp excisional debridement of sacral pressure ulcer on 05/03  Wound culture showing mixed gram-positive organisms, gram-negative rods, Proteus species. ID following     3. Anemia  of CKD with superimposed acute blood loss due to GI bleed  EGD 5 /8 due to GIB, showed antral ulcer  GDA embolization by IR 5/9       4. Secondary hyperparathyrodism of renal origin  Follow closely on vit d analogue due to sarcoid and h/o stones  On rocaltrol, phoslo     5.  Hyperkalemia with CKD  Improved with dailysis  Will follow     6. resp failure  covid pos  Per id     Brittany Muhammad

## 2020-05-23 NOTE — PROGRESS NOTES
Informed Dr. Haylee Kidd that pt glucose was 261 and pt doesn't having sliding scale coverage, MD stated pt can remain without coverage and to call her if glucose is >300

## 2020-05-23 NOTE — PROGRESS NOTES
AMANUEL PROGRESS NOTE      Chief complaint: Follow-up of ongoing antibiotic treatment for infected sacral pressure ulcer    The patient is a 78 y.o. female with history of DM, hypertension, CKD, sarcoidosis, previously admitted on 05/03 for infected sacral pressure s/p excisional debridement on 05/04 with wound culture showing MRSA and tissue culture growing Proteus mirabilis, Enterococcus avium, anaerobic beta-lactamase negative Gram-negative rods, Candida albicans and incidentally found to test positive for COVID-19 and with hospital course complicated by GI bleeding from antral and pyloric ulcers s/p gastroduodenal artery embolization on 05/09, discharge to [address] [place] [address],[address] on 05/13 to continue piperacillin-tazobactam, vancomycin and fluconazole until 06/14 with plan for diverting colostomy when she tests negative for COVID-19, transferred back to [facility] on 05/21 for recurrent melena. On transfer, she has been afebrile and hemodynamically stable with leukocytosis up to 18,000 and hemoglobinemia of 5.6. She remains positive for COVID-19. Piperacillin-tazobactam and fluconazole were resumed. She underwent EGD on 05/22 during which healing prepyloric ulcer with no signs of bleeding was noted. Subjective: Patient was seen and examined. No chills, no abdominal pain, no diarrhea, no rash, no itching. Objective:  BP (!) 103/58   Pulse 104   Temp 97.4 °F (36.3 °C) (Temporal)   Resp 14   Ht 5' 1\" (1.549 m)   Wt 230 lb 8 oz (104.6 kg)   SpO2 99%   BMI 43.55 kg/m²   Constitutional: Alert, not in distress  Respiratory: Clear breath sounds, no crackles, no wheezes  Cardiovascular: Regular rate and rhythm, no murmurs  Gastrointestinal: Bowel sounds present, soft, nontender  Skin: Warm and dry, no active dermatoses  Musculoskeletal: Sacral ulcer as follow:                  Labs, imaging, and medical records/notes were personally reviewed.     Assessment:  Infected sacral pressure ulcer s/p bedside debridement on 05/03, excisional debridement on 05/04. Wound culture grew MRSA. Tissue culture grew Enterococcus avium, Proteus mirabilis, Candida albicans. SARS-CoV-2 infection    Recommendations:  Continue piperacillin-tazobactam 3.375g q12h and fluconazole 200 mg daily dosed according to renal function, and vancomycin dosed according to trough per Pharmacy for 6 weeks until 06/14 as previously planned. Continue local wound care. Follow up plan for possible diverting colostomy after repeat COVID-19 testing becomes negative. Plan was discussed with Dr. Yenny Navarrete.     Thank you for involving me in the [place] [facility] I will continue to follow. Please do not hesitate to call for any questions or concerns.     Electronically signed by Arabella Warren MD on 5/23/2020 at 9:01 AM

## 2020-05-23 NOTE — PROGRESS NOTES
Patient transferred to [facility] bed. Large liquid dark red to black BM noted. Patient bathed cristiane care and nicole care done.

## 2020-05-23 NOTE — PROGRESS NOTES
Pharmacy Consultation Note  (Antibiotic Dosing and Monitoring)    Initial consult date: 5/22/20  Consulting physician: Dr. Kay Landeros  Drug(s): Vancomycin  Indication: Infected sacral pressure ulcer      Age/  Gender Height Weight IBW Dosing weight  Allergy Information   69 y.o./female 5' 1\" (154.9 cm) 215 lb 13.3 oz (97.9 kg)     Ideal body weight: 47.8 kg (105 lb 6.1 oz)  Adjusted ideal body weight: 70.5 kg (155 lb 6.8 oz)  102 kg  Patient has no known allergies. Other anti-infectives Start date Stop date   Zosyn     Fluconazole                 Date  Tmax WBC BUN/CR CrCL  (mL/min) Drug/Dose Time   Given Level(s)   (Time) Comments   5/22 afebrile 13.4  x HD x3.5 hours Vancomycin 2000 mg IV X1 1527 Random level 5/20 was 11.5 @0802    5/23 afebrile 11.2 x HD x3.5 hours x x Pre-HD level 31.6 @0440                                Intake/Output Summary (Last 24 hours) at 5/23/2020 1013  Last data filed at 5/23/2020 1000  Gross per 24 hour   Intake 1776 ml   Output 760 ml   Net 1016 ml       Cultures:    Site Date Result   MRSA Nares     Wound            No results for input(s): Magdalena Alanis in the last 72 hours. Historical Cultures:  Organism   Date Value Ref Range Status   05/04/2020 Candida albicans (A)  Preliminary   05/04/2020 Proteus mirabilis (A)  Final   05/04/2020 Enterococcus avium (A)  Final     No results for input(s): BC in the last 72 hours. Assessment:  · 78 yoF with a PMH significant for CKD on HD initially admitted on 5/3 for further evaluation of an infected sacral ulcer s/p exicisional debridement initiated on empiric ABX until 6/14 by ID. Historical cultures were revealing for MRSA, proteus, E. Avium, Candida, and anaerobic beta-lactamase negative GNR's. Pharmacy has been asked to assist with vancomycin dosing. · Goal trough = 15 - 20 mcg/ml  · HD scheduled for every MWF with an additional session on 5/23.     Plan:  · No vancomycin needed today  · Pre-HD level tomorrow AM  · Pharmacist will

## 2020-05-23 NOTE — FLOWSHEET NOTE
05/23/20 1145   Observations & Evaluations   Bilateral Breath Sounds Diminished   Vital Signs   /72   Pulse 93   Resp 17   SpO2 100 %   Hemodialysis Central Access Femoral vein catheter Left Femoral vein   Placement Date/Time: 05/09/20 1745   Pre-existing: No  Access Type: Femoral vein catheter  Orientation: Left  Access Location: Femoral vein   Site Assessment Red   Dressing Intervention Dressing changed   Dressing Status New drainage  (brown gellitan drainage with open areas to suture sites, RN )   Post-Hemodialysis Assessment   Post-Treatment Procedures Blood returned;Catheter capped, clamped with Citrate x 2 ports   Machine Disinfection Process Exterior Machine Disinfection   Rinseback Volume (ml) 300 ml   Total Liters Processed (l/min) 58.4 l/min   Dialyzer Clearance Clotted   Duration of Treatment (minutes) 210 minutes   Hemodialysis Intake (ml) 300 ml   Hemodialysis Output (ml) 1500 ml   NET Removed (ml) 1200 ml   Tolerated Treatment Good   Patient Response to Treatment tolerated tx fair 1200ml removed

## 2020-05-24 LAB
ALBUMIN SERPL-MCNC: 1.2 G/DL (ref 3.5–5.2)
ALP BLD-CCNC: 84 U/L (ref 35–104)
ALT SERPL-CCNC: 30 U/L (ref 0–32)
ANION GAP SERPL CALCULATED.3IONS-SCNC: 9 MMOL/L (ref 7–16)
AST SERPL-CCNC: 27 U/L (ref 0–31)
BASOPHILS ABSOLUTE: 0.03 E9/L (ref 0–0.2)
BASOPHILS RELATIVE PERCENT: 0.4 % (ref 0–2)
BILIRUB SERPL-MCNC: <0.2 MG/DL (ref 0–1.2)
BUN BLDV-MCNC: 31 MG/DL (ref 8–23)
C-REACTIVE PROTEIN: 19.3 MG/DL (ref 0–0.4)
CALCIUM SERPL-MCNC: 7 MG/DL (ref 8.6–10.2)
CHLORIDE BLD-SCNC: 101 MMOL/L (ref 98–107)
CO2: 28 MMOL/L (ref 22–29)
CREAT SERPL-MCNC: 2.5 MG/DL (ref 0.5–1)
D DIMER: 527 NG/ML DDU
EOSINOPHILS ABSOLUTE: 0.28 E9/L (ref 0.05–0.5)
EOSINOPHILS RELATIVE PERCENT: 3.3 % (ref 0–6)
FERRITIN: 965 NG/ML
FIBRINOGEN: 646 MG/DL (ref 225–540)
GFR AFRICAN AMERICAN: 23
GFR NON-AFRICAN AMERICAN: 19 ML/MIN/1.73
GLUCOSE BLD-MCNC: 220 MG/DL (ref 74–99)
HCT VFR BLD CALC: 21.3 % (ref 34–48)
HCT VFR BLD CALC: 23.4 % (ref 34–48)
HCT VFR BLD CALC: 26 % (ref 34–48)
HEMOGLOBIN: 6.9 G/DL (ref 11.5–15.5)
HEMOGLOBIN: 7.6 G/DL (ref 11.5–15.5)
HEMOGLOBIN: 8.5 G/DL (ref 11.5–15.5)
IMMATURE GRANULOCYTES #: 0.27 E9/L
IMMATURE GRANULOCYTES %: 3.2 % (ref 0–5)
INR BLD: 1
LACTATE DEHYDROGENASE: 243 U/L (ref 135–214)
LYMPHOCYTES ABSOLUTE: 0.8 E9/L (ref 1.5–4)
LYMPHOCYTES RELATIVE PERCENT: 9.6 % (ref 20–42)
MAGNESIUM: 1.8 MG/DL (ref 1.6–2.6)
MCH RBC QN AUTO: 30.1 PG (ref 26–35)
MCHC RBC AUTO-ENTMCNC: 32.4 % (ref 32–34.5)
MCV RBC AUTO: 93 FL (ref 80–99.9)
METER GLUCOSE: 191 MG/DL (ref 74–99)
METER GLUCOSE: 201 MG/DL (ref 74–99)
METER GLUCOSE: 229 MG/DL (ref 74–99)
MONOCYTES ABSOLUTE: 0.67 E9/L (ref 0.1–0.95)
MONOCYTES RELATIVE PERCENT: 8 % (ref 2–12)
NEUTROPHILS ABSOLUTE: 6.32 E9/L (ref 1.8–7.3)
NEUTROPHILS RELATIVE PERCENT: 75.5 % (ref 43–80)
ORGANISM: ABNORMAL
ORGANISM: ABNORMAL
PDW BLD-RTO: 16.9 FL (ref 11.5–15)
PHOSPHORUS: 3.7 MG/DL (ref 2.5–4.5)
PLATELET # BLD: 117 E9/L (ref 130–450)
PMV BLD AUTO: 11.1 FL (ref 7–12)
POTASSIUM SERPL-SCNC: 4 MMOL/L (ref 3.5–5)
PROTHROMBIN TIME: 11.5 SEC (ref 9.3–12.4)
RBC # BLD: 2.29 E12/L (ref 3.5–5.5)
SODIUM BLD-SCNC: 138 MMOL/L (ref 132–146)
TOTAL CK: 36 U/L (ref 20–180)
TOTAL PROTEIN: 4.1 G/DL (ref 6.4–8.3)
VANCOMYCIN RANDOM: 22.1 MCG/ML (ref 5–40)
WBC # BLD: 8.4 E9/L (ref 4.5–11.5)
WOUND/ABSCESS: ABNORMAL
WOUND/ABSCESS: ABNORMAL

## 2020-05-24 PROCEDURE — 85610 PROTHROMBIN TIME: CPT

## 2020-05-24 PROCEDURE — 84100 ASSAY OF PHOSPHORUS: CPT

## 2020-05-24 PROCEDURE — 6360000002 HC RX W HCPCS: Performed by: STUDENT IN AN ORGANIZED HEALTH CARE EDUCATION/TRAINING PROGRAM

## 2020-05-24 PROCEDURE — 2580000003 HC RX 258: Performed by: INTERNAL MEDICINE

## 2020-05-24 PROCEDURE — 85018 HEMOGLOBIN: CPT

## 2020-05-24 PROCEDURE — 6360000002 HC RX W HCPCS: Performed by: INTERNAL MEDICINE

## 2020-05-24 PROCEDURE — 82962 GLUCOSE BLOOD TEST: CPT

## 2020-05-24 PROCEDURE — 83735 ASSAY OF MAGNESIUM: CPT

## 2020-05-24 PROCEDURE — 36592 COLLECT BLOOD FROM PICC: CPT

## 2020-05-24 PROCEDURE — 36430 TRANSFUSION BLD/BLD COMPNT: CPT

## 2020-05-24 PROCEDURE — 85378 FIBRIN DEGRADE SEMIQUANT: CPT

## 2020-05-24 PROCEDURE — 36415 COLL VENOUS BLD VENIPUNCTURE: CPT

## 2020-05-24 PROCEDURE — 6360000002 HC RX W HCPCS: Performed by: CLINICAL NURSE SPECIALIST

## 2020-05-24 PROCEDURE — 86140 C-REACTIVE PROTEIN: CPT

## 2020-05-24 PROCEDURE — 80053 COMPREHEN METABOLIC PANEL: CPT

## 2020-05-24 PROCEDURE — 80202 ASSAY OF VANCOMYCIN: CPT

## 2020-05-24 PROCEDURE — 6370000000 HC RX 637 (ALT 250 FOR IP): Performed by: INTERNAL MEDICINE

## 2020-05-24 PROCEDURE — 82550 ASSAY OF CK (CPK): CPT

## 2020-05-24 PROCEDURE — 85025 COMPLETE CBC W/AUTO DIFF WBC: CPT

## 2020-05-24 PROCEDURE — C9113 INJ PANTOPRAZOLE SODIUM, VIA: HCPCS | Performed by: INTERNAL MEDICINE

## 2020-05-24 PROCEDURE — 6370000000 HC RX 637 (ALT 250 FOR IP): Performed by: FAMILY MEDICINE

## 2020-05-24 PROCEDURE — 2580000003 HC RX 258: Performed by: CLINICAL NURSE SPECIALIST

## 2020-05-24 PROCEDURE — 85014 HEMATOCRIT: CPT

## 2020-05-24 PROCEDURE — 83615 LACTATE (LD) (LDH) ENZYME: CPT

## 2020-05-24 PROCEDURE — 82728 ASSAY OF FERRITIN: CPT

## 2020-05-24 PROCEDURE — 2140000000 HC CCU INTERMEDIATE R&B

## 2020-05-24 PROCEDURE — 85384 FIBRINOGEN ACTIVITY: CPT

## 2020-05-24 PROCEDURE — P9016 RBC LEUKOCYTES REDUCED: HCPCS

## 2020-05-24 RX ORDER — 0.9 % SODIUM CHLORIDE 0.9 %
20 INTRAVENOUS SOLUTION INTRAVENOUS ONCE
Status: DISCONTINUED | OUTPATIENT
Start: 2020-05-24 | End: 2020-05-27 | Stop reason: HOSPADM

## 2020-05-24 RX ADMIN — MICONAZOLE NITRATE: 20.6 POWDER TOPICAL at 09:56

## 2020-05-24 RX ADMIN — PANTOPRAZOLE SODIUM 40 MG: 40 INJECTION, POWDER, FOR SOLUTION INTRAVENOUS at 19:37

## 2020-05-24 RX ADMIN — SUCRALFATE 1 G: 1 TABLET ORAL at 23:46

## 2020-05-24 RX ADMIN — CALCITRIOL 0.25 MCG: 0.25 CAPSULE ORAL at 09:54

## 2020-05-24 RX ADMIN — DAPTOMYCIN 400 MG: 500 INJECTION, POWDER, LYOPHILIZED, FOR SOLUTION INTRAVENOUS at 10:27

## 2020-05-24 RX ADMIN — SODIUM CHLORIDE, PRESERVATIVE FREE 10 ML: 5 INJECTION INTRAVENOUS at 18:08

## 2020-05-24 RX ADMIN — SUCRALFATE 1 G: 1 TABLET ORAL at 01:59

## 2020-05-24 RX ADMIN — ERTAPENEM SODIUM 500 MG: 1 INJECTION, POWDER, LYOPHILIZED, FOR SOLUTION INTRAMUSCULAR; INTRAVENOUS at 11:15

## 2020-05-24 RX ADMIN — INSULIN LISPRO 2 UNITS: 100 INJECTION, SOLUTION INTRAVENOUS; SUBCUTANEOUS at 16:18

## 2020-05-24 RX ADMIN — Medication 100 MG: at 09:55

## 2020-05-24 RX ADMIN — OXYCODONE HYDROCHLORIDE AND ACETAMINOPHEN 1 TABLET: 5; 325 TABLET ORAL at 16:08

## 2020-05-24 RX ADMIN — SUCRALFATE 1 G: 1 TABLET ORAL at 18:03

## 2020-05-24 RX ADMIN — SODIUM CHLORIDE: 9 INJECTION, SOLUTION INTRAVENOUS at 04:00

## 2020-05-24 RX ADMIN — SUCRALFATE 1 G: 1 TABLET ORAL at 12:04

## 2020-05-24 RX ADMIN — COLLAGENASE SANTYL: 250 OINTMENT TOPICAL at 11:14

## 2020-05-24 RX ADMIN — SODIUM CHLORIDE, PRESERVATIVE FREE 10 ML: 5 INJECTION INTRAVENOUS at 19:37

## 2020-05-24 RX ADMIN — GABAPENTIN 300 MG: 300 CAPSULE ORAL at 09:54

## 2020-05-24 RX ADMIN — SUCRALFATE 1 G: 1 TABLET ORAL at 06:32

## 2020-05-24 RX ADMIN — PANTOPRAZOLE SODIUM 40 MG: 40 INJECTION, POWDER, FOR SOLUTION INTRAVENOUS at 09:54

## 2020-05-24 RX ADMIN — FLUCONAZOLE 200 MG: 100 TABLET ORAL at 09:55

## 2020-05-24 RX ADMIN — SODIUM CHLORIDE, PRESERVATIVE FREE 10 ML: 5 INJECTION INTRAVENOUS at 14:48

## 2020-05-24 RX ADMIN — SODIUM CHLORIDE, PRESERVATIVE FREE 10 ML: 5 INJECTION INTRAVENOUS at 09:54

## 2020-05-24 RX ADMIN — INSULIN LISPRO 1 UNITS: 100 INJECTION, SOLUTION INTRAVENOUS; SUBCUTANEOUS at 19:45

## 2020-05-24 RX ADMIN — HYDROMORPHONE HYDROCHLORIDE 0.5 MG: 1 INJECTION, SOLUTION INTRAMUSCULAR; INTRAVENOUS; SUBCUTANEOUS at 11:09

## 2020-05-24 RX ADMIN — HYDROMORPHONE HYDROCHLORIDE 0.5 MG: 1 INJECTION, SOLUTION INTRAMUSCULAR; INTRAVENOUS; SUBCUTANEOUS at 23:45

## 2020-05-24 RX ADMIN — Medication 500 MG: at 09:55

## 2020-05-24 ASSESSMENT — PAIN DESCRIPTION - DESCRIPTORS
DESCRIPTORS: STABBING
DESCRIPTORS: ACHING

## 2020-05-24 ASSESSMENT — PAIN DESCRIPTION - FREQUENCY
FREQUENCY: INTERMITTENT
FREQUENCY: INTERMITTENT

## 2020-05-24 ASSESSMENT — PAIN DESCRIPTION - PAIN TYPE
TYPE: CHRONIC PAIN
TYPE: CHRONIC PAIN

## 2020-05-24 ASSESSMENT — PAIN SCALES - GENERAL
PAINLEVEL_OUTOF10: 6
PAINLEVEL_OUTOF10: 0
PAINLEVEL_OUTOF10: 4

## 2020-05-24 ASSESSMENT — PAIN DESCRIPTION - ONSET
ONSET: PROGRESSIVE
ONSET: SUDDEN

## 2020-05-24 ASSESSMENT — PAIN DESCRIPTION - LOCATION
LOCATION: SACRUM
LOCATION: SACRUM

## 2020-05-24 NOTE — PROGRESS NOTES
Started transfusing blood, blood confirmed with another RN, v/s stable pt denies any pain or discomfort, plt remains on room air and afebrile, no clinical s/s of any distress, RN at bedside

## 2020-05-24 NOTE — PROGRESS NOTES
as follow:      Has nicole cath- yellow  Right PICC- no phlebitis     Labs, imaging, and medical records/notes were personally reviewed. 5/22/2020- sacral culture with klebsiella oxytoca and E. Faecium     Assessment:  Infected sacral pressure ulcer s/p bedside debridement on 05/03, excisional debridement on 05/04. Wound culture grew MRSA. Tissue culture grew Enterococcus avium, Proteus mirabilis, Candida albicans. SARS-CoV-2 infection    Recommendations:  · Antibiotics changed to Invanz and Daptomycin until 06/14 as previously planned. · Check CK total   · Adjusted for renal   · Continue local wound care. · Follow up plan for possible diverting colostomy after repeat COVID-19 testing becomes negative. · Labs reviewed     Thank you for involving me in the care of [facility].     Electronically signed by PABLO Chery on 5/24/2020 at 12:16 PM

## 2020-05-24 NOTE — PROGRESS NOTES
Notified Dr. Hugh Soer that patient does not have a sliding scale or medications ordered for her blood sugars.

## 2020-05-24 NOTE — PROGRESS NOTES
Progress Note  5/24/2020 1:16 PM  Subjective:   Admit Date: 5/21/2020  PCP: Kristina Norton MD  Interval History: Patient seen  On dialysis     5/24/20: Patient not examined d/t COVID restrictions. Notes, results reviewed. Diet: DIET PEPTIC ULCER;    Data:   Scheduled Meds:   sodium chloride  20 mL Intravenous Once    daptomycin (CUBICIN) IVPB  400 mg Intravenous Q48H    ertapenem (INVANZ) IVPB  500 mg Intravenous Daily    miconazole   Topical BID    collagenase   Topical Daily    sucralfate  1 g Oral 4 times per day    gabapentin  300 mg Oral Daily    pantoprazole  40 mg Intravenous BID    sodium chloride flush  10 mL Intravenous 2 times per day    calcitRIOL  0.25 mcg Oral Daily    fluconazole  200 mg Oral Daily    vitamin C  500 mg Oral Daily    zinc sulfate  100 mg Oral Daily     Continuous Infusions:   dextrose       PRN Meds:HYDROmorphone, anticoagulant sodium citrate, sodium chloride flush, polyethylene glycol, hydrOXYzine, glucose, dextrose, glucagon (rDNA), dextrose, acetaminophen **OR** acetaminophen, oxyCODONE-acetaminophen  I/O last 3 completed shifts: In: 615 [P.O.:160; I.V.:55; IV Piggyback:100]  Out: 1835 [Urine:335]  I/O this shift:  In: 345 [P.O.:200; I.V.:45; IV Piggyback:100]  Out: 125 [Urine:125]    Intake/Output Summary (Last 24 hours) at 5/24/2020 1316  Last data filed at 5/24/2020 1210  Gross per 24 hour   Intake 660 ml   Output 375 ml   Net 285 ml     CBC:   Recent Labs     05/22/20  0630  05/23/20  0440  05/23/20  1815 05/24/20  0000 05/24/20  0600   WBC 13.4*  --  11.2  --   --   --  8.4   HGB 7.0*   < > 8.0*  8.1*   < > 8.8* 7.6* 6.9*     --  170  --   --   --  117*    < > = values in this interval not displayed.      BMP:    Recent Labs     05/22/20  0630 05/23/20  0440 05/24/20  0600    140 138   K 4.3 3.9 4.0    104 101   CO2 25 23 28   BUN 54* 49* 31*   CREATININE 3.1* 3.1* 2.5*   GLUCOSE 135* 184* 220*     Hepatic:   Recent Labs     05/22/20  0630 20  0440 20  0600   AST 58* 39* 27   ALT 36* 36* 30   BILITOT 0.3 0.3 <0.2   ALKPHOS 100 83 84     Troponin:   Recent Labs     20  2340   TROPONINI 0.11*     BNP: No results for input(s): BNP in the last 72 hours. Lipids: No results for input(s): CHOL, HDL in the last 72 hours. Invalid input(s): LDLCALCU  ABGs:   Lab Results   Component Value Date    PO2ART 110.4 2020    ALR7AJH 43.7 2020     INR:   Recent Labs     20  0630 20  0440 20  0600   INR 1.0 1.0 1.0       -----------------------------------------------------------------  RAD: Xr Abdomen (kub) (single Ap View)    Result Date: 2020  Patient MRN:  [id number] : 1950 Age: 78 years Gender: Female Order Date:  2020 11:15 AM EXAM: XR ABDOMEN (KUB) (SINGLE AP VIEW) NUMBER OF IMAGES:  2 views INDICATION:  GI bleed Reason for Exam:->GI bleed Portable? ->Yes COMPARISON: None FINDINGS:  The bowel gas pattern demonstrates air with dilated loops of bowel. Most significant in the left side of the abdomen. There is evidence for previous embolization The findings suggest an obstruction. The obstruction is likely at the level of the small bowel. There is no evidence to suggest ascites or mass. Findings compatible with an obstruction. Nm Gi Blood Loss    Result Date: 2020  Patient MRN: [id number] : 1950 Age:  78 years Gender: Female Order Date: 2020 3:00 PM Exam: NM GI BLOOD LOSS Number of Images: 4 views Indication:   GIB GIB COMPARISON: None. FINDINGS: The patient was injected with 29 mCi of technetium UltraTag. No extravasation of contrast was observed in the gastrointestinal tract or elsewhere. No evidence of an active GI bleed.        Objective:   Vitals: /68   Pulse 79   Temp 96.9 °F (36.1 °C) (Infrared)   Resp 14   Ht 5' 1\" (1.549 m)   Wt 215 lb 12.8 oz (97.9 kg)   SpO2 99%   BMI 40.78 kg/m²   General appearance: appears stated age   Skin:  No rashes or

## 2020-05-24 NOTE — PROGRESS NOTES
Patient continues to refuse turns. Patient was educated on importance of turns but still refuses turns stating that she is in too much pain.  Will continue to renforce importance of turns

## 2020-05-25 LAB
ALBUMIN SERPL-MCNC: 1.6 G/DL (ref 3.5–5.2)
ALP BLD-CCNC: 111 U/L (ref 35–104)
ALT SERPL-CCNC: 27 U/L (ref 0–32)
ANION GAP SERPL CALCULATED.3IONS-SCNC: 11 MMOL/L (ref 7–16)
ANISOCYTOSIS: ABNORMAL
AST SERPL-CCNC: 21 U/L (ref 0–31)
BASOPHILIC STIPPLING: ABNORMAL
BASOPHILS ABSOLUTE: 0.1 E9/L (ref 0–0.2)
BASOPHILS RELATIVE PERCENT: 0.9 % (ref 0–2)
BILIRUB SERPL-MCNC: 0.2 MG/DL (ref 0–1.2)
BUN BLDV-MCNC: 15 MG/DL (ref 8–23)
BURR CELLS: ABNORMAL
CALCIUM SERPL-MCNC: 7.3 MG/DL (ref 8.6–10.2)
CHLORIDE BLD-SCNC: 100 MMOL/L (ref 98–107)
CO2: 28 MMOL/L (ref 22–29)
CREAT SERPL-MCNC: 1.5 MG/DL (ref 0.5–1)
EOSINOPHILS ABSOLUTE: 0.19 E9/L (ref 0.05–0.5)
EOSINOPHILS RELATIVE PERCENT: 1.8 % (ref 0–6)
GFR AFRICAN AMERICAN: 41
GFR NON-AFRICAN AMERICAN: 34 ML/MIN/1.73
GLUCOSE BLD-MCNC: 199 MG/DL (ref 74–99)
HCT VFR BLD CALC: 26.8 % (ref 34–48)
HCT VFR BLD CALC: 28.3 % (ref 34–48)
HEMOGLOBIN: 8.8 G/DL (ref 11.5–15.5)
HEMOGLOBIN: 9.3 G/DL (ref 11.5–15.5)
INR BLD: 1
LYMPHOCYTES ABSOLUTE: 0.54 E9/L (ref 1.5–4)
LYMPHOCYTES RELATIVE PERCENT: 5.3 % (ref 20–42)
MAGNESIUM: 1.7 MG/DL (ref 1.6–2.6)
MCH RBC QN AUTO: 30.1 PG (ref 26–35)
MCHC RBC AUTO-ENTMCNC: 32.9 % (ref 32–34.5)
MCV RBC AUTO: 91.6 FL (ref 80–99.9)
METER GLUCOSE: 150 MG/DL (ref 74–99)
METER GLUCOSE: 168 MG/DL (ref 74–99)
METER GLUCOSE: 183 MG/DL (ref 74–99)
METER GLUCOSE: 191 MG/DL (ref 74–99)
MONOCYTES ABSOLUTE: 0.64 E9/L (ref 0.1–0.95)
MONOCYTES RELATIVE PERCENT: 6.1 % (ref 2–12)
NEUTROPHILS ABSOLUTE: 9.2 E9/L (ref 1.8–7.3)
NEUTROPHILS RELATIVE PERCENT: 86 % (ref 43–80)
NUCLEATED RED BLOOD CELLS: 0.9 /100 WBC
PDW BLD-RTO: 16.7 FL (ref 11.5–15)
PHOSPHORUS: 2.1 MG/DL (ref 2.5–4.5)
PLATELET # BLD: 146 E9/L (ref 130–450)
PMV BLD AUTO: 11 FL (ref 7–12)
POIKILOCYTES: ABNORMAL
POLYCHROMASIA: ABNORMAL
POTASSIUM SERPL-SCNC: 3.9 MMOL/L (ref 3.5–5)
PROTHROMBIN TIME: 11.3 SEC (ref 9.3–12.4)
RBC # BLD: 3.09 E12/L (ref 3.5–5.5)
SCHISTOCYTES: ABNORMAL
SODIUM BLD-SCNC: 139 MMOL/L (ref 132–146)
TOTAL PROTEIN: 4.9 G/DL (ref 6.4–8.3)
WBC # BLD: 10.7 E9/L (ref 4.5–11.5)

## 2020-05-25 PROCEDURE — 6360000002 HC RX W HCPCS: Performed by: FAMILY MEDICINE

## 2020-05-25 PROCEDURE — 6360000002 HC RX W HCPCS: Performed by: CLINICAL NURSE SPECIALIST

## 2020-05-25 PROCEDURE — 82962 GLUCOSE BLOOD TEST: CPT

## 2020-05-25 PROCEDURE — 6370000000 HC RX 637 (ALT 250 FOR IP): Performed by: INTERNAL MEDICINE

## 2020-05-25 PROCEDURE — 6370000000 HC RX 637 (ALT 250 FOR IP): Performed by: FAMILY MEDICINE

## 2020-05-25 PROCEDURE — 84100 ASSAY OF PHOSPHORUS: CPT

## 2020-05-25 PROCEDURE — 2140000000 HC CCU INTERMEDIATE R&B

## 2020-05-25 PROCEDURE — 83735 ASSAY OF MAGNESIUM: CPT

## 2020-05-25 PROCEDURE — 85610 PROTHROMBIN TIME: CPT

## 2020-05-25 PROCEDURE — 36415 COLL VENOUS BLD VENIPUNCTURE: CPT

## 2020-05-25 PROCEDURE — C9113 INJ PANTOPRAZOLE SODIUM, VIA: HCPCS | Performed by: INTERNAL MEDICINE

## 2020-05-25 PROCEDURE — 2580000003 HC RX 258: Performed by: CLINICAL NURSE SPECIALIST

## 2020-05-25 PROCEDURE — 85018 HEMOGLOBIN: CPT

## 2020-05-25 PROCEDURE — 2580000003 HC RX 258: Performed by: INTERNAL MEDICINE

## 2020-05-25 PROCEDURE — 85025 COMPLETE CBC W/AUTO DIFF WBC: CPT

## 2020-05-25 PROCEDURE — 6360000002 HC RX W HCPCS: Performed by: INTERNAL MEDICINE

## 2020-05-25 PROCEDURE — 85014 HEMATOCRIT: CPT

## 2020-05-25 PROCEDURE — 99231 SBSQ HOSP IP/OBS SF/LOW 25: CPT | Performed by: FAMILY MEDICINE

## 2020-05-25 PROCEDURE — 80053 COMPREHEN METABOLIC PANEL: CPT

## 2020-05-25 PROCEDURE — 90935 HEMODIALYSIS ONE EVALUATION: CPT

## 2020-05-25 RX ADMIN — PANTOPRAZOLE SODIUM 40 MG: 40 INJECTION, POWDER, FOR SOLUTION INTRAVENOUS at 21:10

## 2020-05-25 RX ADMIN — ALTEPLASE 1 MG: 2.2 INJECTION, POWDER, LYOPHILIZED, FOR SOLUTION INTRAVENOUS at 14:00

## 2020-05-25 RX ADMIN — ERTAPENEM SODIUM 500 MG: 1 INJECTION, POWDER, LYOPHILIZED, FOR SOLUTION INTRAMUSCULAR; INTRAVENOUS at 13:02

## 2020-05-25 RX ADMIN — OXYCODONE HYDROCHLORIDE AND ACETAMINOPHEN 1 TABLET: 5; 325 TABLET ORAL at 15:12

## 2020-05-25 RX ADMIN — SODIUM CHLORIDE, PRESERVATIVE FREE 10 ML: 5 INJECTION INTRAVENOUS at 09:48

## 2020-05-25 RX ADMIN — Medication 100 MG: at 09:47

## 2020-05-25 RX ADMIN — SODIUM CHLORIDE, PRESERVATIVE FREE 10 ML: 5 INJECTION INTRAVENOUS at 21:10

## 2020-05-25 RX ADMIN — SUCRALFATE 1 G: 1 TABLET ORAL at 17:46

## 2020-05-25 RX ADMIN — SUCRALFATE 1 G: 1 TABLET ORAL at 13:02

## 2020-05-25 RX ADMIN — GABAPENTIN 300 MG: 300 CAPSULE ORAL at 09:47

## 2020-05-25 RX ADMIN — INSULIN LISPRO 1 UNITS: 100 INJECTION, SOLUTION INTRAVENOUS; SUBCUTANEOUS at 07:14

## 2020-05-25 RX ADMIN — SUCRALFATE 1 G: 1 TABLET ORAL at 06:20

## 2020-05-25 RX ADMIN — Medication 500 MG: at 09:47

## 2020-05-25 RX ADMIN — INSULIN LISPRO 1 UNITS: 100 INJECTION, SOLUTION INTRAVENOUS; SUBCUTANEOUS at 21:21

## 2020-05-25 RX ADMIN — SODIUM CHLORIDE, PRESERVATIVE FREE 10 ML: 5 INJECTION INTRAVENOUS at 13:02

## 2020-05-25 RX ADMIN — INSULIN LISPRO 1 UNITS: 100 INJECTION, SOLUTION INTRAVENOUS; SUBCUTANEOUS at 16:45

## 2020-05-25 RX ADMIN — MICONAZOLE NITRATE: 20.6 POWDER TOPICAL at 13:04

## 2020-05-25 RX ADMIN — MICONAZOLE NITRATE: 20.6 POWDER TOPICAL at 21:22

## 2020-05-25 RX ADMIN — CALCITRIOL 0.25 MCG: 0.25 CAPSULE ORAL at 09:48

## 2020-05-25 RX ADMIN — PANTOPRAZOLE SODIUM 40 MG: 40 INJECTION, POWDER, FOR SOLUTION INTRAVENOUS at 09:49

## 2020-05-25 RX ADMIN — FLUCONAZOLE 200 MG: 100 TABLET ORAL at 09:46

## 2020-05-25 RX ADMIN — OXYCODONE HYDROCHLORIDE AND ACETAMINOPHEN 1 TABLET: 5; 325 TABLET ORAL at 06:19

## 2020-05-25 ASSESSMENT — PAIN SCALES - GENERAL
PAINLEVEL_OUTOF10: 0
PAINLEVEL_OUTOF10: 0
PAINLEVEL_OUTOF10: 6
PAINLEVEL_OUTOF10: 6
PAINLEVEL_OUTOF10: 0
PAINLEVEL_OUTOF10: 0

## 2020-05-25 ASSESSMENT — PAIN DESCRIPTION - ONSET: ONSET: ON-GOING

## 2020-05-25 ASSESSMENT — PAIN DESCRIPTION - PROGRESSION: CLINICAL_PROGRESSION: NOT CHANGED

## 2020-05-25 ASSESSMENT — PAIN DESCRIPTION - LOCATION: LOCATION: SACRUM

## 2020-05-25 ASSESSMENT — PAIN DESCRIPTION - PAIN TYPE: TYPE: CHRONIC PAIN

## 2020-05-25 ASSESSMENT — PAIN - FUNCTIONAL ASSESSMENT: PAIN_FUNCTIONAL_ASSESSMENT: ACTIVITIES ARE NOT PREVENTED

## 2020-05-25 ASSESSMENT — PAIN DESCRIPTION - DESCRIPTORS: DESCRIPTORS: ACHING;CONSTANT;DISCOMFORT

## 2020-05-25 ASSESSMENT — PAIN DESCRIPTION - FREQUENCY: FREQUENCY: INTERMITTENT

## 2020-05-25 NOTE — PROGRESS NOTES
AMANUEL PROGRESS NOTE      Chief complaint: Follow-up of ongoing antibiotic treatment for infected sacral pressure ulcer    The patient is a 78 y.o. female with history of DM, hypertension, CKD, sarcoidosis, previously admitted on 05/03 for infected sacral pressure s/p excisional debridement on 05/04 with wound culture showing MRSA and tissue culture growing Proteus mirabilis, Enterococcus avium, anaerobic beta-lactamase negative Gram-negative rods, Candida albicans and incidentally found to test positive for COVID-19 and with hospital course complicated by GI bleeding from antral and pyloric ulcers s/p gastroduodenal artery embolization on 05/09, discharge to [address] [place] [address],[address] on 05/13 to continue piperacillin-tazobactam, vancomycin and fluconazole until 06/14 with plan for diverting colostomy when she tests negative for COVID-19, transferred back to [facility] on 05/21 for recurrent melena. On transfer, she has been afebrile and hemodynamically stable with leukocytosis up to 18,000 and hemoglobinemia of 5.6. She remains positive for COVID-19.cWound cultures on 05/22 showed Klebsiella oxytoca (resistant to ceftriaxone and piperacillin-tazobactam) and Enterococcus faecium (resistant to ampicillin and vancomycin). Piperacillin-tazobactam and fluconazole were resumed. She underwent EGD on 05/22 during which healing prepyloric ulcer with no signs of bleeding was noted. Subjective: Patient was seen and examined. No chills, no abdominal pain, no diarrhea, no rash, no itching.       Objective:  BP (!) 104/51   Pulse 101   Temp 97.4 °F (36.3 °C) (Oral)   Resp 16   Ht 5' 1\" (1.549 m)   Wt 233 lb 11 oz (106 kg)   SpO2 96%   BMI 44.15 kg/m²   Constitutional: Alert, not in distress  Respiratory: Clear breath sounds, no crackles, no wheezes  Cardiovascular: Regular rate and rhythm, no murmurs  Gastrointestinal: Bowel sounds present, soft, nontender  Skin: Warm and dry, no active dermatoses  Musculoskeletal: Sacral ulcer as follow:                  Labs, imaging, and medical records/notes were personally reviewed. Assessment:  Infected sacral pressure ulcer s/p bedside debridement on 05/03, excisional debridement on 05/04. Wound culture grew MRSA. Tissue culture grew Enterococcus avium, Proteus mirabilis, Candida albicans. Wound cultures on 05/22 showed Klebsiella oxytoca (resistant to ceftriaxone and piperacillin-tazobactam) and Enterococcus faecium (resistant to ampicillin and vancomycin). SARS-CoV-2 infection    Recommendations:  Continue ertapenem 500 mg q24h dosed according to renal function and daptomycin 8 mg/kg adjusted body weight for 6 weeks until 06/14 as previously planned. Continue local wound care. Follow up plan for possible diverting colostomy after repeat COVID-19 testing becomes negative. Repeat COVID-19 test tomorrow. Thank you for involving me in the care of [employer]. I will continue to follow. Please do not hesitate to call for any questions or concerns.     Electronically signed by Yana Orlando MD on 5/25/2020 at 10:57 AM

## 2020-05-25 NOTE — PROGRESS NOTES
HD tx completed, UF 400mls, B profile maintained. Dressing changed, groin area pink, no drainage from exit site.  Report given to CANDACE KHAN [facility]

## 2020-05-25 NOTE — PROGRESS NOTES
Upon entering pt's room pt was hooked up to dialysis and I was unable to access her picc line since the dialysis machines were blocking her picc line access.

## 2020-05-25 NOTE — PROGRESS NOTES
Admit Date: 5/21/2020       Subjective:  No chief complaint on file. GI Bleed  covid +      Pt transferred from ICU. Called by nurse regarding  Non functioning PICC line  Saw pt today in ppe gear  She was awake alert and eating. No major comoplainats    Problems :    1.  GI bleed  hgb 6.9 today 7.6 at midnight 8.8 at 6 pm     hgb 9.6 today   PPI carafate liq diet   No source idnetified    2.  covid + zinc and vit c    3.   Decubitus ulcer   - S/p debridement on 5/4   - Wound cx- Enterococcus, Candida, Proteus   - On Vancomycin, Zosyn and Fluconazole    4.  dm2   Decreased po intake   No coverage for now 220 today   Started sliding scale    5.  ckd   - Cr-2.9 on admission 31/2.5 today   - On HD    - BMP daily    - Nephrology following      6.  htn   92/50   On no meds now         Objective:    Scheduled Meds:  Current Facility-Administered Medications   Medication Dose Route Frequency Provider Last Rate Last Dose    HYDROmorphone (DILAUDID) injection 0.5 mg  0.5 mg Intravenous Q12H PRN Baldev Zarate,    0.5 mg at 05/24/20 2345    0.9 % sodium chloride bolus  20 mL Intravenous Once Michelle Díaz MD        DAPTOmycin (CUBICIN) 400 mg in sodium chloride 0.9 % 50 mL IVPB  400 mg Intravenous Q48H PABLO Olivia - CNS   Stopped at 05/24/20 1113    ertapenem (INVANZ) 500 mg in sodium chloride 0.9 % 50 mL IVPB  500 mg Intravenous Daily PABLO Olivia   Stopped at 05/25/20 1351    insulin lispro (HUMALOG) injection vial 0-6 Units  0-6 Units Subcutaneous TID  Michelle Díaz MD   1 Units at 05/25/20 0714    insulin lispro (HUMALOG) injection vial 0-3 Units  0-3 Units Subcutaneous Nightly Michelle Díaz MD   1 Units at 05/24/20 1945    anticoagulant sodium citrate 4 GM/100ML solution 0.16 g  4 mL Intracatheter PRN Quincy Mcclure DO        miconazole (MICOTIN) 2 % powder   Topical BID Quincy Mcclure DO        collagenase ointment Topical Daily Dhanunjay Scot Burner, DO        sucralfate (CARAFATE) tablet 1 g  1 g Oral 4 times per day [facility], DO   1 g at 05/25/20 1302    gabapentin (NEURONTIN) capsule 300 mg  300 mg Oral Daily [facility], DO   300 mg at 05/25/20 0947    pantoprazole (PROTONIX) injection 40 mg  40 mg Intravenous BID [facility], DO   40 mg at 05/25/20 0949    sodium chloride flush 0.9 % injection 10 mL  10 mL Intravenous 2 times per day [facility], DO   10 mL at 05/24/20 1937    sodium chloride flush 0.9 % injection 10 mL  10 mL Intravenous PRN [facility], DO   10 mL at 05/25/20 1302    polyethylene glycol (GLYCOLAX) packet 17 g  17 g Oral Daily PRN [facility], DO        calcitRIOL (ROCALTROL) capsule 0.25 mcg  0.25 mcg Oral Daily [facility], DO   0.25 mcg at 05/25/20 0948    hydrOXYzine (VISTARIL) capsule 25 mg  25 mg Oral TID PRN [facility], DO        fluconazole (DIFLUCAN) tablet 200 mg  200 mg Oral Daily [facility], DO   200 mg at 05/25/20 0946    glucose (GLUTOSE) 40 % oral gel 15 g  15 g Oral PRN Dhanunjay Scot Burner, DO        dextrose 50 % IV solution  12.5 g Intravenous PRN [facility], DO        glucagon (rDNA) injection 1 mg  1 mg Intramuscular PRN [facility], DO        dextrose 5 % solution  100 mL/hr Intravenous PRN [facility], DO        acetaminophen (TYLENOL) tablet 650 mg  650 mg Oral Q6H PRN [facility], DO        Or    acetaminophen (TYLENOL) suppository 650 mg  650 mg Rectal Q6H PRN Dhanunjay Scot Burner, DO        oxyCODONE-acetaminophen (PERCOCET) 5-325 MG per tablet 1 tablet  1 tablet Oral Q6H PRN Dhanunjay Scot Burner, DO   1 tablet at 05/25/20 2786    vitamin C (ASCORBIC ACID) tablet 500 mg  500 mg Oral Daily Quincy Drake Dean, DO   500 mg at 05/25/20 0947    zinc sulfate (ZINCATE) capsule 100 mg  100 mg Oral Daily Quincy oMran, DO   100 mg at 05/25/20 0947       Continuous Infusions  :   dextrose         PRN Meds:  HYDROmorphone, anticoagulant sodium citrate, sodium chloride flush, polyethylene glycol, hydrOXYzine, glucose, dextrose, glucagon (rDNA), dextrose, acetaminophen **OR** acetaminophen, oxyCODONE-acetaminophen      Wt Readings from Last 3 Encounters:   05/25/20 234 lb 11.2 oz (106.5 kg)   05/13/20 220 lb 10.9 oz (100.1 kg)   04/11/20 238 lb 3.2 oz (108 kg)     I/O last 3 completed shifts: In: 1140 [P.O.:320; I.V.:60; Blood:355]  Out: 1450 [Urine:250]    Intake/Output Summary (Last 24 hours) at 5/25/2020 1507  Last data filed at 5/25/2020 1240  Gross per 24 hour   Intake 1535 ml   Output 1450 ml   Net 85 ml       Vitals:    05/25/20 1255   BP: (!) 148/61   Pulse: 111   Resp: 16   Temp:    SpO2: 96%       Physical Exam:      Discussed with nurse . In covid room    CBC  Recent Labs     05/23/20  0440  05/24/20  0600 05/24/20  1930 05/25/20  0030 05/25/20  1400   WBC 11.2  --  8.4  --   --  10.7   HGB 8.0*  8.1*   < > 6.9* 8.5* 8.8* 9.3*   HCT 25.1*  24.6*   < > 21.3* 26.0* 26.8* 28.3*   MCV 91.9  --  93.0  --   --  91.6     --  117*  --   --  146    < > = values in this interval not displayed. BMP  Recent Labs     05/23/20  0440 05/24/20  0600 05/25/20  1400    138 139   K 3.9 4.0 3.9    101 100   CO2 23 28 28   BUN 49* 31* 15   CREATININE 3.1* 2.5* 1.5*   LABGLOM 15 19 34   CALCIUM 7.4* 7.0* 7.3*     LFT's  Recent Labs     05/23/20  0440 05/24/20  0600 05/25/20  1400   ALT 36* 30 27     INR:   Recent Labs     05/23/20  0440 05/24/20  0600 05/25/20  1400   INR 1.0 1.0 1.0         Lab Results   Component Value Date    CRP 19.3 (H) 05/24/2020     Lab Results   Component Value Date    SEDRATE 45 (H) 08/13/2019     No results for input(s): POCGLU in the last 72 hours.   Lipids: No disease ([facility] N18.3, D63.1    PERLA (acute kidney injury) ([facility] N17.9    Primary osteoarthritis of both knees M17.0    Acute on chronic renal insufficiency N28.9, N18.9    Acute renal failure (HCC) K03.5    Metabolic acidosis V19.7    Acute renal failure superimposed on chronic kidney disease, on chronic dialysis (HCC) N17.9, N18.9, Z99.2    Sepsis ([facility] A41.9    2019 novel coronavirus disease (COVID-19) U07.1    GI bleed K92.2    Severe protein-calorie malnutrition ([facility] E43     1. GI bleed  hgb 6.9 today 7.6 at midnight 8.8 at 6 pm   PPI carafate liq diet   No source idnetified    2. S/ embolization 5-9-20   Successful arteriograms of the celiac, SMA, common hepatic, and GDA   . No active extravasation was identified. Successful prophylactic embolization of the GDA.        2.  covid + zinc and vit c    3.   Decubitus ulcer -  large   - S/p debridement on 5/4   - Wound cx- Enterococcus, Candida, Proteus   - On Vancomycin, Zosyn and Fluconazole    4.  dm2   Decreased po intake   No coverage for now 220 today    5.  ckd   gfr 9   - Cr-2.9 on admission 31/2.5 today   - On HD    - BMP daily    - Nephrology following      6.  htn   92/50   On no meds now               Plan:    continue present care  Has clogged subclavian catheter  Monitor hgb and e-lizette Lugo M.D.    5/25/2020

## 2020-05-25 NOTE — PROGRESS NOTES
Progress Note  5/25/2020 1:10 PM  Subjective:   Admit Date: 5/21/2020  PCP: Bay Alegria MD  Interval History: Patient doing well , u/o remains low Hemodialysis this AM     Diet: DIET PEPTIC ULCER;    Data:   Scheduled Meds:   alteplase  1 mg Intracatheter Once    sodium chloride  20 mL Intravenous Once    daptomycin (CUBICIN) IVPB  400 mg Intravenous Q48H    ertapenem (INVANZ) IVPB  500 mg Intravenous Daily    insulin lispro  0-6 Units Subcutaneous TID WC    insulin lispro  0-3 Units Subcutaneous Nightly    miconazole   Topical BID    collagenase   Topical Daily    sucralfate  1 g Oral 4 times per day    gabapentin  300 mg Oral Daily    pantoprazole  40 mg Intravenous BID    sodium chloride flush  10 mL Intravenous 2 times per day    calcitRIOL  0.25 mcg Oral Daily    fluconazole  200 mg Oral Daily    vitamin C  500 mg Oral Daily    zinc sulfate  100 mg Oral Daily     Continuous Infusions:   dextrose       PRN Meds:HYDROmorphone, anticoagulant sodium citrate, sodium chloride flush, polyethylene glycol, hydrOXYzine, glucose, dextrose, glucagon (rDNA), dextrose, acetaminophen **OR** acetaminophen, oxyCODONE-acetaminophen  I/O last 3 completed shifts: In: 200 [P.O.:400; I.V.:125; Blood:355; IV Piggyback:100]  Out: 375 [Urine:375]  I/O this shift: In: 920 [P.O.:120]  Out: 1200     Intake/Output Summary (Last 24 hours) at 5/25/2020 1310  Last data filed at 5/25/2020 1240  Gross per 24 hour   Intake 1555 ml   Output 1450 ml   Net 105 ml     CBC:   Recent Labs     05/23/20  0440  05/24/20  0600 05/24/20  1930 05/25/20  0030   WBC 11.2  --  8.4  --   --    HGB 8.0*  8.1*   < > 6.9* 8.5* 8.8*     --  117*  --   --     < > = values in this interval not displayed.      BMP:    Recent Labs     05/23/20  0440 05/24/20  0600    138   K 3.9 4.0    101   CO2 23 28   BUN 49* 31*   CREATININE 3.1* 2.5*   GLUCOSE 184* 220*     Hepatic:   Recent Labs     05/23/20  0440 05/24/20  0600   AST 39* 27 abnormality. Neck: no adenopathy, no carotid bruit, no JVD, supple, symmetrical, trachea midline and thyroid not enlarged, symmetric, no tenderness/mass/nodules  Lungs: diminished breath sounds bibasilar  Heart: regular rate and rhythm, S1, S2 normal, no murmur, click, rub or gallop  Abdomen: soft, non-tender; bowel sounds normal; no masses,  no organomegaly  Extremities: edema +  Neurologic: Mental status: lethargic     Assessment:   Patient Active Problem List:     Neurologic gait dysfunction     Renal failure, acute on chronic (HCC)     Diabetes mellitus ([facility] [address]     Hypertension     Arthritis     Kidney disease     Acute blood loss anemia     Knee problem     Anemia due to stage 3 chronic kidney disease (HCC)     PERLA (acute kidney injury) ([facility])     Primary osteoarthritis of both knees     Acute on chronic renal insufficiency     Acute renal failure (HCC)     Metabolic acidosis     Acute renal failure superimposed on chronic kidney disease, on chronic dialysis ([facility] [address]     Sepsis ([facility] [address]     2019 novel coronavirus disease (COVID-19)     GI bleed     Severe protein-calorie malnutrition ([facility] [address]    Plan:   IMPRESSION/RECOMMENDATIONS:       1. CKD 4/5  Baseline 4.2, eGFR 10-12 ml/min in 2/2020, 3.1 in 8/2019 4/11/20 JOSUÉ showed no hydronephrosis  Worsening renal function with oliguria  IHD initiated 5/10  Treatment today      2. Sacral wound infection  S/p bedside sharp excisional debridement of sacral pressure ulcer on 05/03  Wound culture showing mixed gram-positive organisms, gram-negative rods, Proteus species.   ID following     3. Anemia  of CKD with superimposed acute blood loss due to GI bleed  EGD 5 /8 due to GIB, showed antral ulcer  GDA embolization by IR 5/9  Hgb 6.9 today, received 1 unit pRBCs        4. Secondary hyperparathyrodism of renal origin  Follow closely on vit d analogue due to sarcoid and h/o stones  On rocaltrol, phoslo  5/24 PO4 at goal <5.0, Ca++ 7.0 with low albumin of 1.2        5.  Hyperkalemia with CKD  Will follow with dialysis     6.  Resp failure  covid pos  Pulmonary and ID following       Brittany Sterling

## 2020-05-26 ENCOUNTER — APPOINTMENT (OUTPATIENT)
Dept: GENERAL RADIOLOGY | Age: 70
DRG: 177 | End: 2020-05-26
Attending: FAMILY MEDICINE
Payer: MEDICARE

## 2020-05-26 LAB
ALBUMIN SERPL-MCNC: 1.3 G/DL (ref 3.5–5.2)
ALP BLD-CCNC: 105 U/L (ref 35–104)
ALT SERPL-CCNC: 20 U/L (ref 0–32)
ANION GAP SERPL CALCULATED.3IONS-SCNC: 13 MMOL/L (ref 7–16)
AST SERPL-CCNC: 17 U/L (ref 0–31)
BASOPHILS ABSOLUTE: 0.04 E9/L (ref 0–0.2)
BASOPHILS RELATIVE PERCENT: 0.5 % (ref 0–2)
BILIRUB SERPL-MCNC: <0.2 MG/DL (ref 0–1.2)
BLOOD BANK DISPENSE STATUS: NORMAL
BLOOD BANK DISPENSE STATUS: NORMAL
BLOOD BANK PRODUCT CODE: NORMAL
BLOOD BANK PRODUCT CODE: NORMAL
BPU ID: NORMAL
BPU ID: NORMAL
BUN BLDV-MCNC: 21 MG/DL (ref 8–23)
CALCIUM SERPL-MCNC: 7.4 MG/DL (ref 8.6–10.2)
CHLORIDE BLD-SCNC: 103 MMOL/L (ref 98–107)
CO2: 25 MMOL/L (ref 22–29)
CREAT SERPL-MCNC: 2.2 MG/DL (ref 0.5–1)
DESCRIPTION BLOOD BANK: NORMAL
DESCRIPTION BLOOD BANK: NORMAL
EOSINOPHILS ABSOLUTE: 0.26 E9/L (ref 0.05–0.5)
EOSINOPHILS RELATIVE PERCENT: 3.2 % (ref 0–6)
GFR AFRICAN AMERICAN: 27
GFR NON-AFRICAN AMERICAN: 22 ML/MIN/1.73
GLUCOSE BLD-MCNC: 215 MG/DL (ref 74–99)
HCT VFR BLD CALC: 25 % (ref 34–48)
HCT VFR BLD CALC: 27.2 % (ref 34–48)
HEMOGLOBIN: 7.9 G/DL (ref 11.5–15.5)
HEMOGLOBIN: 8.6 G/DL (ref 11.5–15.5)
IMMATURE GRANULOCYTES #: 0.35 E9/L
IMMATURE GRANULOCYTES %: 4.3 % (ref 0–5)
INR BLD: 1
LYMPHOCYTES ABSOLUTE: 1.29 E9/L (ref 1.5–4)
LYMPHOCYTES RELATIVE PERCENT: 15.8 % (ref 20–42)
Lab: NORMAL
MAGNESIUM: 1.7 MG/DL (ref 1.6–2.6)
MCH RBC QN AUTO: 29.9 PG (ref 26–35)
MCHC RBC AUTO-ENTMCNC: 31.6 % (ref 32–34.5)
MCV RBC AUTO: 94.4 FL (ref 80–99.9)
METER GLUCOSE: 189 MG/DL (ref 74–99)
METER GLUCOSE: 208 MG/DL (ref 74–99)
METER GLUCOSE: 209 MG/DL (ref 74–99)
METER GLUCOSE: 98 MG/DL (ref 74–99)
MONOCYTES ABSOLUTE: 0.67 E9/L (ref 0.1–0.95)
MONOCYTES RELATIVE PERCENT: 8.2 % (ref 2–12)
NEUTROPHILS ABSOLUTE: 5.54 E9/L (ref 1.8–7.3)
NEUTROPHILS RELATIVE PERCENT: 68 % (ref 43–80)
PDW BLD-RTO: 17.2 FL (ref 11.5–15)
PHOSPHORUS: 2.9 MG/DL (ref 2.5–4.5)
PLATELET # BLD: 157 E9/L (ref 130–450)
PMV BLD AUTO: 10.6 FL (ref 7–12)
POTASSIUM SERPL-SCNC: 3.5 MMOL/L (ref 3.5–5)
PROTHROMBIN TIME: 11.2 SEC (ref 9.3–12.4)
RBC # BLD: 2.88 E12/L (ref 3.5–5.5)
REPORT: NORMAL
SODIUM BLD-SCNC: 141 MMOL/L (ref 132–146)
THIS TEST SENT TO: NORMAL
TOTAL PROTEIN: 4.7 G/DL (ref 6.4–8.3)
WBC # BLD: 8.2 E9/L (ref 4.5–11.5)

## 2020-05-26 PROCEDURE — 6360000002 HC RX W HCPCS: Performed by: STUDENT IN AN ORGANIZED HEALTH CARE EDUCATION/TRAINING PROGRAM

## 2020-05-26 PROCEDURE — 83735 ASSAY OF MAGNESIUM: CPT

## 2020-05-26 PROCEDURE — 6360000002 HC RX W HCPCS: Performed by: INTERNAL MEDICINE

## 2020-05-26 PROCEDURE — C9113 INJ PANTOPRAZOLE SODIUM, VIA: HCPCS | Performed by: INTERNAL MEDICINE

## 2020-05-26 PROCEDURE — 36415 COLL VENOUS BLD VENIPUNCTURE: CPT

## 2020-05-26 PROCEDURE — 36569 INSJ PICC 5 YR+ W/O IMAGING: CPT

## 2020-05-26 PROCEDURE — 02HV33Z INSERTION OF INFUSION DEVICE INTO SUPERIOR VENA CAVA, PERCUTANEOUS APPROACH: ICD-10-PCS | Performed by: FAMILY MEDICINE

## 2020-05-26 PROCEDURE — C1751 CATH, INF, PER/CENT/MIDLINE: HCPCS

## 2020-05-26 PROCEDURE — 71045 X-RAY EXAM CHEST 1 VIEW: CPT

## 2020-05-26 PROCEDURE — 6360000002 HC RX W HCPCS: Performed by: FAMILY MEDICINE

## 2020-05-26 PROCEDURE — 2580000003 HC RX 258: Performed by: INTERNAL MEDICINE

## 2020-05-26 PROCEDURE — 85014 HEMATOCRIT: CPT

## 2020-05-26 PROCEDURE — 85025 COMPLETE CBC W/AUTO DIFF WBC: CPT

## 2020-05-26 PROCEDURE — 6360000002 HC RX W HCPCS: Performed by: CLINICAL NURSE SPECIALIST

## 2020-05-26 PROCEDURE — 6370000000 HC RX 637 (ALT 250 FOR IP): Performed by: INTERNAL MEDICINE

## 2020-05-26 PROCEDURE — 80053 COMPREHEN METABOLIC PANEL: CPT

## 2020-05-26 PROCEDURE — 85018 HEMOGLOBIN: CPT

## 2020-05-26 PROCEDURE — 76937 US GUIDE VASCULAR ACCESS: CPT

## 2020-05-26 PROCEDURE — 85610 PROTHROMBIN TIME: CPT

## 2020-05-26 PROCEDURE — 82962 GLUCOSE BLOOD TEST: CPT

## 2020-05-26 PROCEDURE — 2580000003 HC RX 258: Performed by: FAMILY MEDICINE

## 2020-05-26 PROCEDURE — 84100 ASSAY OF PHOSPHORUS: CPT

## 2020-05-26 PROCEDURE — 2140000000 HC CCU INTERMEDIATE R&B

## 2020-05-26 PROCEDURE — 6370000000 HC RX 637 (ALT 250 FOR IP): Performed by: FAMILY MEDICINE

## 2020-05-26 PROCEDURE — 2580000003 HC RX 258: Performed by: CLINICAL NURSE SPECIALIST

## 2020-05-26 RX ORDER — PANTOPRAZOLE SODIUM 40 MG/1
40 TABLET, DELAYED RELEASE ORAL
Qty: 60 TABLET | Refills: 1 | Status: SHIPPED
Start: 2020-05-26 | End: 2020-05-27 | Stop reason: HOSPADM

## 2020-05-26 RX ORDER — LIDOCAINE HYDROCHLORIDE 10 MG/ML
5 INJECTION, SOLUTION EPIDURAL; INFILTRATION; INTRACAUDAL; PERINEURAL ONCE
Status: DISCONTINUED | OUTPATIENT
Start: 2020-05-26 | End: 2020-05-27 | Stop reason: HOSPADM

## 2020-05-26 RX ORDER — SODIUM CHLORIDE 0.9 % (FLUSH) 0.9 %
10 SYRINGE (ML) INJECTION PRN
Status: DISCONTINUED | OUTPATIENT
Start: 2020-05-26 | End: 2020-05-27 | Stop reason: HOSPADM

## 2020-05-26 RX ORDER — SUCRALFATE 1 G/1
1 TABLET ORAL 4 TIMES DAILY
Qty: 120 TABLET | Refills: 1 | Status: ON HOLD | OUTPATIENT
Start: 2020-05-26 | End: 2020-10-10

## 2020-05-26 RX ORDER — HEPARIN SODIUM (PORCINE) LOCK FLUSH IV SOLN 100 UNIT/ML 100 UNIT/ML
3 SOLUTION INTRAVENOUS PRN
Status: DISCONTINUED | OUTPATIENT
Start: 2020-05-26 | End: 2020-05-27 | Stop reason: HOSPADM

## 2020-05-26 RX ORDER — HEPARIN SODIUM (PORCINE) LOCK FLUSH IV SOLN 100 UNIT/ML 100 UNIT/ML
3 SOLUTION INTRAVENOUS EVERY 12 HOURS SCHEDULED
Status: DISCONTINUED | OUTPATIENT
Start: 2020-05-26 | End: 2020-05-27 | Stop reason: HOSPADM

## 2020-05-26 RX ADMIN — SODIUM CHLORIDE, PRESERVATIVE FREE 10 ML: 5 INJECTION INTRAVENOUS at 09:08

## 2020-05-26 RX ADMIN — DAPTOMYCIN 400 MG: 500 INJECTION, POWDER, LYOPHILIZED, FOR SOLUTION INTRAVENOUS at 12:17

## 2020-05-26 RX ADMIN — FLUCONAZOLE 200 MG: 100 TABLET ORAL at 08:58

## 2020-05-26 RX ADMIN — SODIUM CHLORIDE, PRESERVATIVE FREE 10 ML: 5 INJECTION INTRAVENOUS at 12:17

## 2020-05-26 RX ADMIN — OXYCODONE HYDROCHLORIDE AND ACETAMINOPHEN 1 TABLET: 5; 325 TABLET ORAL at 12:54

## 2020-05-26 RX ADMIN — OXYCODONE HYDROCHLORIDE AND ACETAMINOPHEN 1 TABLET: 5; 325 TABLET ORAL at 06:20

## 2020-05-26 RX ADMIN — INSULIN LISPRO 2 UNITS: 100 INJECTION, SOLUTION INTRAVENOUS; SUBCUTANEOUS at 13:29

## 2020-05-26 RX ADMIN — Medication 300 UNITS: at 21:41

## 2020-05-26 RX ADMIN — Medication 500 MG: at 08:59

## 2020-05-26 RX ADMIN — SUCRALFATE 1 G: 1 TABLET ORAL at 18:15

## 2020-05-26 RX ADMIN — MICONAZOLE NITRATE: 20.6 POWDER TOPICAL at 08:59

## 2020-05-26 RX ADMIN — SUCRALFATE 1 G: 1 TABLET ORAL at 12:16

## 2020-05-26 RX ADMIN — ERTAPENEM SODIUM 500 MG: 1 INJECTION, POWDER, LYOPHILIZED, FOR SOLUTION INTRAMUSCULAR; INTRAVENOUS at 09:00

## 2020-05-26 RX ADMIN — INSULIN LISPRO 1 UNITS: 100 INJECTION, SOLUTION INTRAVENOUS; SUBCUTANEOUS at 21:56

## 2020-05-26 RX ADMIN — PANTOPRAZOLE SODIUM 40 MG: 40 INJECTION, POWDER, FOR SOLUTION INTRAVENOUS at 21:41

## 2020-05-26 RX ADMIN — PANTOPRAZOLE SODIUM 40 MG: 40 INJECTION, POWDER, FOR SOLUTION INTRAVENOUS at 08:58

## 2020-05-26 RX ADMIN — CALCITRIOL 0.25 MCG: 0.25 CAPSULE ORAL at 08:58

## 2020-05-26 RX ADMIN — GABAPENTIN 300 MG: 300 CAPSULE ORAL at 08:59

## 2020-05-26 RX ADMIN — INSULIN LISPRO 2 UNITS: 100 INJECTION, SOLUTION INTRAVENOUS; SUBCUTANEOUS at 16:45

## 2020-05-26 RX ADMIN — SUCRALFATE 1 G: 1 TABLET ORAL at 06:20

## 2020-05-26 RX ADMIN — MICONAZOLE NITRATE: 20.6 POWDER TOPICAL at 21:42

## 2020-05-26 RX ADMIN — HYDROMORPHONE HYDROCHLORIDE 0.5 MG: 1 INJECTION, SOLUTION INTRAMUSCULAR; INTRAVENOUS; SUBCUTANEOUS at 14:22

## 2020-05-26 RX ADMIN — SODIUM CHLORIDE, PRESERVATIVE FREE 10 ML: 5 INJECTION INTRAVENOUS at 21:41

## 2020-05-26 RX ADMIN — COLLAGENASE SANTYL: 250 OINTMENT TOPICAL at 14:56

## 2020-05-26 RX ADMIN — Medication 100 MG: at 08:58

## 2020-05-26 RX ADMIN — SODIUM CHLORIDE, PRESERVATIVE FREE 10 ML: 5 INJECTION INTRAVENOUS at 08:57

## 2020-05-26 ASSESSMENT — PAIN SCALES - GENERAL
PAINLEVEL_OUTOF10: 0
PAINLEVEL_OUTOF10: 4
PAINLEVEL_OUTOF10: 10
PAINLEVEL_OUTOF10: 0
PAINLEVEL_OUTOF10: 8
PAINLEVEL_OUTOF10: 8
PAINLEVEL_OUTOF10: 0

## 2020-05-26 ASSESSMENT — PAIN DESCRIPTION - FREQUENCY
FREQUENCY: INTERMITTENT
FREQUENCY: INTERMITTENT

## 2020-05-26 ASSESSMENT — PAIN DESCRIPTION - ORIENTATION
ORIENTATION: LEFT;RIGHT
ORIENTATION: RIGHT;LEFT

## 2020-05-26 ASSESSMENT — PAIN DESCRIPTION - PROGRESSION
CLINICAL_PROGRESSION: NOT CHANGED
CLINICAL_PROGRESSION: NOT CHANGED

## 2020-05-26 ASSESSMENT — PAIN DESCRIPTION - ONSET
ONSET: ON-GOING
ONSET: ON-GOING

## 2020-05-26 ASSESSMENT — PAIN DESCRIPTION - DESCRIPTORS
DESCRIPTORS: ACHING;CONSTANT;DISCOMFORT
DESCRIPTORS: ACHING;CONSTANT;DISCOMFORT

## 2020-05-26 ASSESSMENT — PAIN - FUNCTIONAL ASSESSMENT
PAIN_FUNCTIONAL_ASSESSMENT: PREVENTS OR INTERFERES WITH MANY ACTIVE NOT PASSIVE ACTIVITIES
PAIN_FUNCTIONAL_ASSESSMENT: PREVENTS OR INTERFERES WITH ALL ACTIVE AND SOME PASSIVE ACTIVITIES

## 2020-05-26 ASSESSMENT — PAIN DESCRIPTION - LOCATION
LOCATION: SACRUM
LOCATION: SACRUM

## 2020-05-26 ASSESSMENT — PAIN DESCRIPTION - PAIN TYPE
TYPE: CHRONIC PAIN
TYPE: CHRONIC PAIN

## 2020-05-26 NOTE — PROGRESS NOTES
Progress Note  5/26/2020 9:23 AM  Subjective:   Admit Date: 5/21/2020  PCP: Vinay Washington MD  Interval History: Patient doing well , u/o remains low Hemodialysis this AM   5/26:  Per nursing, pt is doing better, awake and alert. Reports she ate 70% of lunch. She is having another PICC line placed now, then labs will be drawn. Diet: DIET PEPTIC ULCER;    Data:   Scheduled Meds:   lidocaine PF  5 mL Intradermal Once    heparin flush  3 mL Intravenous 2 times per day    sodium chloride  20 mL Intravenous Once    daptomycin (CUBICIN) IVPB  400 mg Intravenous Q48H    ertapenem (INVANZ) IVPB  500 mg Intravenous Daily    insulin lispro  0-6 Units Subcutaneous TID WC    insulin lispro  0-3 Units Subcutaneous Nightly    miconazole   Topical BID    collagenase   Topical Daily    sucralfate  1 g Oral 4 times per day    gabapentin  300 mg Oral Daily    pantoprazole  40 mg Intravenous BID    sodium chloride flush  10 mL Intravenous 2 times per day    calcitRIOL  0.25 mcg Oral Daily    fluconazole  200 mg Oral Daily    vitamin C  500 mg Oral Daily    zinc sulfate  100 mg Oral Daily     Continuous Infusions:   dextrose       PRN Meds:sodium chloride flush, heparin flush, HYDROmorphone, anticoagulant sodium citrate, sodium chloride flush, polyethylene glycol, hydrOXYzine, glucose, dextrose, glucagon (rDNA), dextrose, acetaminophen **OR** acetaminophen, oxyCODONE-acetaminophen  I/O last 3 completed shifts: In: 1020 [P.O.:220]  Out: 1850 [Urine:650]  No intake/output data recorded.     Intake/Output Summary (Last 24 hours) at 5/26/2020 0923  Last data filed at 5/26/2020 7171  Gross per 24 hour   Intake 1020 ml   Output 1850 ml   Net -830 ml     CBC:   Recent Labs     05/24/20  0600 05/24/20  1930 05/25/20  0030 05/25/20  1400   WBC 8.4  --   --  10.7   HGB 6.9* 8.5* 8.8* 9.3*   *  --   --  146     BMP:    Recent Labs     05/24/20  0600 05/25/20  1400    139   K 4.0 3.9    100   CO2 28 28 BUN 31* 15   CREATININE 2.5* 1.5*   GLUCOSE 220* 199*     Hepatic:   Recent Labs     20  0600 20  1400   AST 27 21   ALT 30 27   BILITOT <0.2 0.2   ALKPHOS 84 111*     Troponin: No results for input(s): TROPONINI in the last 72 hours. BNP: No results for input(s): BNP in the last 72 hours. Lipids: No results for input(s): CHOL, HDL in the last 72 hours. Invalid input(s): LDLCALCU  ABGs:   Lab Results   Component Value Date    PO2ART 110.4 2020    LJH0SAO 43.7 2020     INR:   Recent Labs     20  0600 20  1400   INR 1.0 1.0       -----------------------------------------------------------------  RAD: Xr Abdomen (kub) (single Ap View)    Result Date: 2020  Patient MRN:  [id number] : 1950 Age: 78 years Gender: Female Order Date:  2020 11:15 AM EXAM: XR ABDOMEN (KUB) (SINGLE AP VIEW) NUMBER OF IMAGES:  2 views INDICATION:  GI bleed Reason for Exam:->GI bleed Portable? ->Yes COMPARISON: None FINDINGS:  The bowel gas pattern demonstrates air with dilated loops of bowel. Most significant in the left side of the abdomen. There is evidence for previous embolization The findings suggest an obstruction. The obstruction is likely at the level of the small bowel. There is no evidence to suggest ascites or mass. Findings compatible with an obstruction. Nm Gi Blood Loss    Result Date: 2020  Patient MRN: [id number] : 1950 Age:  78 years Gender: Female Order Date: 2020 3:00 PM Exam: NM GI BLOOD LOSS Number of Images: 4 views Indication:   GIB GIB COMPARISON: None. FINDINGS: The patient was injected with 29 mCi of technetium UltraTag. No extravasation of contrast was observed in the gastrointestinal tract or elsewhere. No evidence of an active GI bleed.        Objective:   Vitals: /67   Pulse 84   Temp 95.9 °F (35.5 °C) (Axillary)   Resp 20   Ht 5' 1\" (1.549 m)   Wt 234 lb 11.2 oz (106.5 kg)   SpO2 96%   BMI 44.35 kg/m²    This 5/9  Hgb 9.3, s/p 1 unit pRBCs     4. Secondary hyperparathyrodism of renal origin  Follow closely on vit d analogue due to sarcoid and h/o stones  On rocaltrol, phoslo  5/24 PO4 at goal <5.0, Ca++ 7.0 with low albumin of 1.2-->1. 6     5. Hyperkalemia with CKD  Will follow with dialysis     6.  Resp failure  covid pos  Pulmonary and ID following       PABLO Jacobs-CNP    Pt seen and examined agree with above  For hd tiw  covid pos  Remove temp hd line and place johny [employer]

## 2020-05-26 NOTE — PROGRESS NOTES
Vascular Access Procedure Note    Procedure Date:   5/26/2020    Pre-procedure Verification/Time-Out:  The proposed risks versus benefits of this procedure were discussed in detail by the physician with family. written consent was obtained from the patient and the patient's daughter. Relevant documentation was reviewed prior to procedure including signed consent form and medications. All necessary equipment for procedure is available at time of procedure yes. An audible time out was done at 1230PM by team members, correctly identifying patients name, medical record number, correct side, correct site, and correct procedure to be performed with registered nurse members of the procedure team all in agreement.         Indication for Procedure:   Reason for Insertion: intravenous antibiotics    ASA Assessment (Required for Moderate & Deep Sedation):  ASA 3 - Patient with moderate systemic disease with functional limitations    Procedure:   Reason for Consultation: power PICC line    Clinician Performing Procedure:   Rinku Foley rn    Assistant:  none    Sedation:   Analgesia Used: lidocaine 1%    Procedure Details/Findings:  Catheter Urdu Size: 5 fr dl  Lot Number: [id number]  Product #: [id number]  Expiration Date: 05/31/2021  Maximum Barrier Precautions: cap, eye shield, full body drape, gloves, gown, handwashing and mask  Skin Prep: chlorhexidine  Technique: modified seldinger and ultrasound guided with VPS  Attempts: 1  Exposed (cm): 1  Total (cm): 42  Placement Site: left basilic vein  Vessel Size: 0.54 cm  Dressing: securement device, transparent dressing and biopatch  Blood Return: Yes   Ultrasound Guidance: Yes   Arm Circumference Mid-Bicep (cm): 38  Chest X-Ray Ordered: chest x-ray  End Placement: caj    Complications:   none     Post-operative Condition:  stable  Patient Tolerated Procedure: well     Comments/Post-operative Education:   Post Procedure Interventions: no blood pressure sign placed above bed    Noe Jordan  05/26/20  12:59 PM

## 2020-05-26 NOTE — PROGRESS NOTES
Subjective: The patient is awake and alert. No problems overnight. Denies chest pain, angina, and dyspnea. Denies abdominal pain. Tolerating diet. No nausea or vomiting. Patient and family are requesting transfer to [facility] clinic    Objective:    BP (!) 110/48   Pulse 92   Temp 97.5 °F (36.4 °C) (Oral)   Resp 12   Ht 5' 1\" (1.549 m)   Wt 234 lb 11.2 oz (106.5 kg)   SpO2 98%   BMI 44.35 kg/m²     Heart:  RRR, no murmurs, gallops, or rubs.   Lungs:  CTA bilaterally, no wheeze, rales or rhonchi  Abd: bowel sounds present, nontender, nondistended, no masses  Extrem:  No clubbing, cyanosis, mild edema legs  Neuro: No focal deficits    CBC with Differential:    Lab Results   Component Value Date    WBC 8.2 05/26/2020    RBC 2.88 05/26/2020    HGB 8.6 05/26/2020    HCT 27.2 05/26/2020     05/26/2020    MCV 94.4 05/26/2020    MCH 29.9 05/26/2020    MCHC 31.6 05/26/2020    RDW 17.2 05/26/2020    NRBC 0.9 05/25/2020    SEGSPCT 71 08/16/2013    BANDSPCT 1 03/29/2016    METASPCT 0.9 05/10/2020    LYMPHOPCT 15.8 05/26/2020    PROMYELOPCT 0.9 05/09/2020    MONOPCT 8.2 05/26/2020    MYELOPCT 1.7 05/11/2020    BASOPCT 0.5 05/26/2020    MONOSABS 0.67 05/26/2020    LYMPHSABS 1.29 05/26/2020    EOSABS 0.26 05/26/2020    BASOSABS 0.04 05/26/2020     CMP:    Lab Results   Component Value Date     05/26/2020    K 3.5 05/26/2020    K 5.3 05/03/2020     05/26/2020    CO2 25 05/26/2020    BUN 21 05/26/2020    CREATININE 2.2 05/26/2020    GFRAA 27 05/26/2020    LABGLOM 22 05/26/2020    GLUCOSE 215 05/26/2020    GLUCOSE 149 10/12/2011    PROT 4.7 05/26/2020    LABALBU 1.3 05/26/2020    LABALBU 4.1 10/12/2011    CALCIUM 7.4 05/26/2020    BILITOT <0.2 05/26/2020    ALKPHOS 105 05/26/2020    AST 17 05/26/2020    ALT 20 05/26/2020        Assessment:    Patient Active Problem List   Diagnosis    Neurologic gait dysfunction    Renal failure, acute on chronic ([facility] [address]    Diabetes mellitus ([facility] [address]    Hypertension   

## 2020-05-26 NOTE — PROGRESS NOTES
Spoke with Dr. Ken De Santiago regarding plan of care. Questioned if the plan was to reach out to [facility] per patient's request. Dr. Ken De Santiago states yes he plans to reach out to [facility].    Arjun Larsen

## 2020-05-26 NOTE — PROGRESS NOTES
Pt is refusing dressing change at this time. Verbalizes and agrees to allow dressing to be changed in morning.

## 2020-05-26 NOTE — CARE COORDINATION
Care Coordination:  Plan as noted is to transfer to [facility]. Dr Rebecca Shaikh will initiate referral per nursing.  Repeat covid noted per ID, awaiting plan for transfer prior to retest    Peter Barclay

## 2020-05-26 NOTE — PROGRESS NOTES
Pt is refusing dressing change at this time. Agreeable to a turn. Patient states she is in too much pain to have a dressing change.

## 2020-05-26 NOTE — ADT AUTH CERT
On no meds now       Plan:    Transfuse         ID:   Subjective: Patient was seen and examined. No chills, no abdominal pain, no diarrhea, no rash. Patient reports feeling like her gluteal area is burning.  No apparent rash noted   Had first doses of invanz and daptomycin    Patient is on room air.        Assessment:   Infected sacral pressure ulcer s/p bedside debridement on 05/03, excisional debridement on 05/04. Wound culture grew MRSA. Tissue culture grew Enterococcus avium, Proteus mirabilis, Candida albicans. SARS-CoV-2 infection       Recommendations:   · Antibiotics changed to Invanz and Daptomycin until 06/14 as previously planned. · Check CK total    · Adjusted for renal    · Continue local wound care. · Follow up plan for possible diverting colostomy after repeat COVID-19 testing becomes negative. · Labs reviewed       Nephro:   IMPRESSION/RECOMMENDATIONS:      1. CKD 4/5   Baseline 4.2, eGFR 10-12 ml/min in 2/2020, 3.1 in 8/2019 4/11/20 JOSUÉ showed no hydronephrosis   Worsening renal function with oliguria   IHD initiated 5/10   Treatment today        2. Sacral wound infection   S/p bedside sharp excisional debridement of sacral pressure ulcer on 05/03   Wound culture showing mixed gram-positive organisms, gram-negative rods, Proteus species.    ID following       3. Anemia   of CKD with superimposed acute blood loss due to GI bleed   EGD 5 /8 due to GIB, showed antral ulcer   GDA embolization by IR 5/9   Hgb 6.9 today, received 1 unit pRBCs        4. Secondary hyperparathyrodism of renal origin   Follow closely on vit d analogue due to sarcoid and h/o stones   On rocaltrol, phoslo   5/24 PO4 at goal <5.0, Ca++ 7.0 with low albumin of 1.2        5. Hyperkalemia with CKD   Will follow with dialysis       6.  Resp failure   covid pos   Pulmonary and ID following       Gastrointestinal Bleeding, Upper - Care Day 3 (5/23/2020) by Kenyatta Woods RN         Review Status Review Entered Completed 5/26/2020 08:48       Criteria Review      Care Day: 3 Care Date: 5/23/2020 Level of Care:    Guideline Day 1    Level Of Care    (X) ICU [A] or floor    Clinical Status    ( ) * Clinical Indications met [B]    Routes    (X) IV fluids, medications    Interventions    (X) CBC    (X) PT/PTT    (X) Blood type and crossmatch    (X) Large-bore or central IV access    (X) Possible transfusion of RBC    (X) Possible fresh-frozen plasma/platelets    Medications    (X) IV proton pump inhibitor    (X) Antibiotic prophylaxis in cirrhotic patient    5/26/2020 8:48 AM EDT by andrzej Raines    * Milestone   Additional Notes   05/23/20 0400  97.3 (36.3)  10  82  113/62  -  -  -  -  99 % RA      5/23/2020 04:40   WBC: 11.2   RBC: 2.73 (L)   Hemoglobin Quant: 8.0 (L)   Hematocrit: 25.1 (L)   MCV: 91.9   MCH: 29.3   MCHC: 31.9 (L)   MPV: 10.7   RDW: 17.0 (H)   Platelet Count: 024   Neutrophils %: 74.0   Immature Granulocytes %: 3.9   Lymphocyte %: 9.6 (L)   Monocytes %: 8.4   Eosinophils %: 3.7   Basophils %: 0.4   Neutrophils Absolute: 8.30 (H)   Immature Granulocytes #: 0.44   Lymphocytes Absolute: 1.08 (L)   Monocytes Absolute: 0.94   Eosinophils Absolute: 0.42   Basophils Absolute: 0.05      5/23/2020 12:20   Hemoglobin Quant: 8.5 (L)   Hematocrit: 26.2 (L)      5/23/2020 18:15   Hemoglobin Quant: 8.8 (L)   Hematocrit: 27.0 (L)      Scheduled Meds:   · miconazole Topical BID   · collagenase Topical Daily   · sucralfate 1 g Oral 4 times per day   · gabapentin 300 mg Oral Daily   · vancomycin (VANCOCIN) intermittent dosing (placeholder) Other RX Placeholder   · pantoprazole 40 mg Intravenous BID   · sodium chloride flush 10 mL Intravenous 2 times per day   · calcitRIOL 0.25 mcg Oral Daily   · fluconazole 200 mg Oral Daily   · piperacillin-tazobactam 3.375 g Intravenous Q12H   · vitamin C 500 mg Oral Daily   · zinc sulfate 100 mg Oral Daily   · sodium chloride Intravenous Q12H      PRN Medications due to sarcoid and h/o stones   On rocaltrol, phoslo       5. Hyperkalemia with CKD   Improved with dailysis   Will follow       6. resp failure   covid pos   Per id          Critical Care:   Subjective    Patient seen this am. Discussed with nurse. Post transfusion hg- 8.5 this am.       Resident's Assessment and Plan       Kaylynn Morales is a 78 y.o. female with PMH of DM2, HTN, CKD, obesity, Sarcoidosis, Anemia, large decubitus ulcer s/p debridement and GIB s/p embolization 5/9/20 who was transferred from [facility] for Acute GIB and Hg of 5.6.  Currently COVID (+)x3       <Neurologic>   - AAOx3       <Cardiovascular>   Ho Hypertension   - Was on hydralazine - held   - BP stable       <Pulmonary>   Covid PNA   - On room air   - Last Covid 5/14- (+); Repeat Covid test this admission positive    - IL-6 ordered   - Ferritin 1,491, D-Dimer 730, , Fibrinogen 520   - ID currently following   - OnVitamin C and Zinc supplementation   - CXR for tomorrow       <Gastrointestinal>   Acute on chronic GIB   - GIB on 5/8; EGD 5/9- D2 Ulcer and Antral Ulcer; Embolization on 5/9   - Hg stable until this morning- around 8   - total  units received since 5/22 - 3   - Transfusion goal Keep Hb>7   - PPI BID   - Gen Surgery following: EGD, scan showed no source of bleed; recommend to continue ppi bid and carafate; clear liguid diet   - Keep NPO for now       Transaminitis - improving   - ALT 37, AST 67   - Continue to monitor daily CMP       <Infectious Disease>   Decubitus Ulcer   - S/p debridement on 5/4   - Wound cx- Enterococcus, Candida, Proteus   - On Vancomycin, Zosyn and Fluconazole    - ID following   - Consulted  Wound care   - Previously ID and GSY wanted to do diverting colostomy, awaiting for Covid (-) x2       Leukocytosis   - On Vancomycin, Zosyn and Fluconazole to be continued until 6/14    - Panculture sent   - CXR for tomorrow    - Daily CBC       <Endocrine>   NIDDM   - Last A1C 5/18- 6.9   - Lantus 10 U nightly in [place]   - Currently NPO   - Will assess BGs and determine  Insulin requirements   - BG Q4H       <Genitourinary/Renal>   CKD   - Cr-2.9 on admission   - On HD    - BMP daily    - Nephrology following       <Hematology/Oncology>   Acute on Chronic Anemia   - likely 2/2 CKD, GIB   - Baseline hg- 8.5-9   - Received retacrit with HD   - H/H Q6H   - Transfuse if <7

## 2020-05-26 NOTE — PROGRESS NOTES
[facility] PROGRESS NOTE      Chief complaint: Follow-up of ongoing antibiotic treatment for infected sacral pressure ulcer    The patient is a 78 y.o. female with history of DM, hypertension, CKD, sarcoidosis, previously admitted on 05/03 for infected sacral pressure s/p excisional debridement on 05/04 with wound culture showing MRSA and tissue culture growing Proteus mirabilis, Enterococcus avium, anaerobic beta-lactamase negative Gram-negative rods, Candida albicans and incidentally found to test positive for COVID-19 and with hospital course complicated by GI bleeding from antral and pyloric ulcers s/p gastroduodenal artery embolization on 05/09, [address] to [address] on 05/13 to continue piperacillin-tazobactam, vancomycin and fluconazole until 06/14 with plan for diverting colostomy when she tests negative for COVID-19, transferred back to [facility] on 05/21 for recurrent melena. On transfer, she has been afebrile and hemodynamically stable with leukocytosis up to 18,000 and hemoglobinemia of 5.6. She remains positive for COVID-19.cWound cultures on 05/22 showed Klebsiella oxytoca (resistant to ceftriaxone and piperacillin-tazobactam) and Enterococcus faecium (resistant to ampicillin and vancomycin). Piperacillin-tazobactam and fluconazole were resumed. She underwent EGD on 05/22 during which healing prepyloric ulcer with no signs of bleeding was noted. Subjective: Patient was seen and examined. No chills, no abdominal pain, no diarrhea, no rash, no itching.       Objective:  /67   Pulse 84   Temp 95.9 °F (35.5 °C) (Axillary)   Resp 20   Ht 5' 1\" (1.549 m)   Wt 234 lb 11.2 oz (106.5 kg)   SpO2 96%   BMI 44.35 kg/m²   Constitutional: Alert, not in distress  Respiratory: Clear breath sounds, no crackles, no wheezes  Cardiovascular: Regular rate and rhythm, no murmurs  Gastrointestinal: Bowel sounds present, soft, nontender  Skin: Warm and dry, no active dermatoses  Musculoskeletal: Sacral ulcer as follow:                  Labs, imaging, and medical records/notes were personally reviewed. Assessment:  Infected sacral pressure ulcer s/p bedside debridement on 05/03, excisional debridement on 05/04. Wound culture grew MRSA. Tissue culture grew Enterococcus avium, Proteus mirabilis, Candida albicans. Wound cultures on 05/22 showed Klebsiella oxytoca (resistant to ceftriaxone and piperacillin-tazobactam) and Enterococcus faecium (resistant to ampicillin and vancomycin). SARS-CoV-2 infection    Recommendations:  Continue ertapenem at 1g q24h and daptomycin 8 mg/kg adjusted body weight for 6 weeks until 06/14 as previously planned. Continue local wound care. Follow up plan for possible diverting colostomy after repeat COVID-19 testing becomes negative. Repeat COVID-19 test.     Thank you for involving me in the care of [facility]. I will continue to follow. Please do not hesitate to call for any questions or concerns.     Electronically signed by Finn Chinchilla MD on 5/26/2020 at 10:50 AM

## 2020-05-26 NOTE — PROGRESS NOTES
Spoke w/ Dr Gabriel Welch who was on the unit. Ntfd him that pt's family was inferring about a transfer to [facility] per their request. Per Dr. Gabriel Welch he will try to contact [facility] about the patient transferring. Also ntfd that orders are needed for a new picc line since pt's current line does not draw and needs to be replaced. See orders.

## 2020-05-26 NOTE — PROGRESS NOTES
Chart reviewed-plan is transfer to [facility]. Goals of care and code status have been identified by palliative medicine team. No further needs identified at this time-will sign off. Please re-consult if condition deteriorates or if further discussion is requested by patient. Thank you!

## 2020-05-26 NOTE — CARE COORDINATION
Care Coordination:  Received a call from Dr Isatu Ibanez regarding repeat testing. She feels it may be needed for [facility] and surgery is indicating a need for covid negative prior to diverting colostomy. Since we are uncertain for need of [facility] as they may accept covid positive, she felt incase they did not accept, delay in testing would delay surgery here. Together with management, call placed to the director of Perioperative service. She confirms that there are designated rooms for covid patients and surgery can be done. She also felt it would be too soon to retest as they are doing retesting weekly and she was just positive for 5/22/20. I have been speaking to family for decisions and nursing agrees, that pt is alert and oriented but confused and family is to make decisions-- as pt is relating to Infectious disease that she wants surgery completed here.   Dr Misha Ponce, in the meantime is checking to see if pt can transfer to [facility] as per family    Som Mcguire

## 2020-05-27 VITALS
TEMPERATURE: 97.3 F | RESPIRATION RATE: 20 BRPM | HEIGHT: 61 IN | DIASTOLIC BLOOD PRESSURE: 55 MMHG | WEIGHT: 212.7 LBS | BODY MASS INDEX: 40.16 KG/M2 | HEART RATE: 120 BPM | SYSTOLIC BLOOD PRESSURE: 111 MMHG | OXYGEN SATURATION: 99 %

## 2020-05-27 LAB
ABO/RH: NORMAL
ALBUMIN SERPL-MCNC: 1.4 G/DL (ref 3.5–5.2)
ALP BLD-CCNC: 94 U/L (ref 35–104)
ALT SERPL-CCNC: 16 U/L (ref 0–32)
ANION GAP SERPL CALCULATED.3IONS-SCNC: 9 MMOL/L (ref 7–16)
ANTIBODY SCREEN: NORMAL
AST SERPL-CCNC: 14 U/L (ref 0–31)
BASOPHILS ABSOLUTE: 0.02 E9/L (ref 0–0.2)
BASOPHILS RELATIVE PERCENT: 0.3 % (ref 0–2)
BILIRUB SERPL-MCNC: <0.2 MG/DL (ref 0–1.2)
BUN BLDV-MCNC: 22 MG/DL (ref 8–23)
CALCIUM SERPL-MCNC: 7.6 MG/DL (ref 8.6–10.2)
CHLORIDE BLD-SCNC: 102 MMOL/L (ref 98–107)
CO2: 27 MMOL/L (ref 22–29)
CREAT SERPL-MCNC: 2.5 MG/DL (ref 0.5–1)
EOSINOPHILS ABSOLUTE: 0.29 E9/L (ref 0.05–0.5)
EOSINOPHILS RELATIVE PERCENT: 3.8 % (ref 0–6)
GFR AFRICAN AMERICAN: 23
GFR NON-AFRICAN AMERICAN: 19 ML/MIN/1.73
GLUCOSE BLD-MCNC: 205 MG/DL (ref 74–99)
HCT VFR BLD CALC: 24.8 % (ref 34–48)
HEMOGLOBIN: 7.8 G/DL (ref 11.5–15.5)
IMMATURE GRANULOCYTES #: 0.37 E9/L
IMMATURE GRANULOCYTES %: 4.8 % (ref 0–5)
INR BLD: 1
LYMPHOCYTES ABSOLUTE: 1 E9/L (ref 1.5–4)
LYMPHOCYTES RELATIVE PERCENT: 13 % (ref 20–42)
MAGNESIUM: 1.8 MG/DL (ref 1.6–2.6)
MCH RBC QN AUTO: 29.7 PG (ref 26–35)
MCHC RBC AUTO-ENTMCNC: 31.5 % (ref 32–34.5)
MCV RBC AUTO: 94.3 FL (ref 80–99.9)
METER GLUCOSE: 188 MG/DL (ref 74–99)
MONOCYTES ABSOLUTE: 0.65 E9/L (ref 0.1–0.95)
MONOCYTES RELATIVE PERCENT: 8.5 % (ref 2–12)
NEUTROPHILS ABSOLUTE: 5.34 E9/L (ref 1.8–7.3)
NEUTROPHILS RELATIVE PERCENT: 69.6 % (ref 43–80)
PDW BLD-RTO: 17 FL (ref 11.5–15)
PHOSPHORUS: 3.2 MG/DL (ref 2.5–4.5)
PLATELET # BLD: 150 E9/L (ref 130–450)
PMV BLD AUTO: 10.6 FL (ref 7–12)
POTASSIUM SERPL-SCNC: 3.6 MMOL/L (ref 3.5–5)
PROTHROMBIN TIME: 11.3 SEC (ref 9.3–12.4)
RBC # BLD: 2.63 E12/L (ref 3.5–5.5)
SODIUM BLD-SCNC: 138 MMOL/L (ref 132–146)
TOTAL PROTEIN: 4.1 G/DL (ref 6.4–8.3)
WBC # BLD: 7.7 E9/L (ref 4.5–11.5)

## 2020-05-27 PROCEDURE — 86850 RBC ANTIBODY SCREEN: CPT

## 2020-05-27 PROCEDURE — 86900 BLOOD TYPING SEROLOGIC ABO: CPT

## 2020-05-27 PROCEDURE — 83735 ASSAY OF MAGNESIUM: CPT

## 2020-05-27 PROCEDURE — 85610 PROTHROMBIN TIME: CPT

## 2020-05-27 PROCEDURE — 82962 GLUCOSE BLOOD TEST: CPT

## 2020-05-27 PROCEDURE — 6370000000 HC RX 637 (ALT 250 FOR IP): Performed by: INTERNAL MEDICINE

## 2020-05-27 PROCEDURE — 85025 COMPLETE CBC W/AUTO DIFF WBC: CPT

## 2020-05-27 PROCEDURE — 80053 COMPREHEN METABOLIC PANEL: CPT

## 2020-05-27 PROCEDURE — 86901 BLOOD TYPING SEROLOGIC RH(D): CPT

## 2020-05-27 PROCEDURE — 36415 COLL VENOUS BLD VENIPUNCTURE: CPT

## 2020-05-27 PROCEDURE — 6370000000 HC RX 637 (ALT 250 FOR IP): Performed by: FAMILY MEDICINE

## 2020-05-27 PROCEDURE — 84100 ASSAY OF PHOSPHORUS: CPT

## 2020-05-27 RX ORDER — ZINC SULFATE 50(220)MG
100 CAPSULE ORAL DAILY
Qty: 30 CAPSULE | Refills: 3 | DISCHARGE
Start: 2020-05-27 | End: 2020-08-11

## 2020-05-27 RX ORDER — SODIUM CHLORIDE 9 MG/ML
INJECTION INTRAVENOUS
Status: DISCONTINUED
Start: 2020-05-27 | End: 2020-05-27 | Stop reason: HOSPADM

## 2020-05-27 RX ORDER — ASCORBIC ACID 500 MG
500 TABLET ORAL DAILY
Qty: 30 TABLET | Refills: 3 | DISCHARGE
Start: 2020-05-27 | End: 2020-08-11

## 2020-05-27 RX ORDER — ANTICOAGULANT SODIUM CITRATE SOLUTION 4 G/100ML
4 SOLUTION INTRAVENOUS PRN
DISCHARGE
Start: 2020-05-27 | End: 2020-08-11

## 2020-05-27 RX ORDER — POLYETHYLENE GLYCOL 3350 17 G/17G
17 POWDER, FOR SOLUTION ORAL DAILY PRN
Qty: 527 G | Refills: 1 | DISCHARGE
Start: 2020-05-27 | End: 2020-06-26

## 2020-05-27 RX ADMIN — SUCRALFATE 1 G: 1 TABLET ORAL at 06:04

## 2020-05-27 RX ADMIN — OXYCODONE HYDROCHLORIDE AND ACETAMINOPHEN 1 TABLET: 5; 325 TABLET ORAL at 00:07

## 2020-05-27 RX ADMIN — SUCRALFATE 1 G: 1 TABLET ORAL at 00:07

## 2020-05-27 RX ADMIN — INSULIN LISPRO 1 UNITS: 100 INJECTION, SOLUTION INTRAVENOUS; SUBCUTANEOUS at 06:15

## 2020-05-27 RX ADMIN — OXYCODONE HYDROCHLORIDE AND ACETAMINOPHEN 1 TABLET: 5; 325 TABLET ORAL at 06:08

## 2020-05-27 ASSESSMENT — PAIN SCALES - GENERAL
PAINLEVEL_OUTOF10: 8

## 2020-05-27 NOTE — PROGRESS NOTES
Dr. Kathryn Calles paged through the answering service regarding discharge to [facility]. Transport  set up in forty minutes.    An Shafer

## 2020-05-27 NOTE — DISCHARGE INSTR - COC
chronic ([facility] N17.9, N18.9    Diabetes mellitus ([facility] E11.9    Hypertension I10    Arthritis M19.90    Kidney disease N28.9    Acute blood loss anemia D62    Knee problem M25.9    Anemia due to stage 3 chronic kidney disease (HCC) N18.3, D63.1    PERLA (acute kidney injury) ([facility] N17.9    Primary osteoarthritis of both knees M17.0    Acute on chronic renal insufficiency N28.9, N18.9    Acute renal failure (HCC) P78.3    Metabolic acidosis P66.0    Acute renal failure superimposed on chronic kidney disease, on chronic dialysis (HCC) N17.9, N18.9, Z99.2    Sepsis ([facility] A41.9    2019 novel coronavirus disease (COVID-19) U07.1    GI bleed K92.2    Severe protein-calorie malnutrition (HCC) E43       Isolation/Infection:   Isolation          Droplet Plus        Patient Infection Status     Infection Onset Added Last Indicated Last Indicated By Review Planned Expiration Resolved Resolved By    COVID-19 Rule Out 05/26/20 05/26/20 05/26/20 COVID-19 (Ordered)        VRE 05/22/20 05/24/20 05/22/20 Culture, Wound        Enterococcus faecium Wound-Coccyx 5/22/20    MDRO (multi-drug resistant organism) 05/22/20 05/24/20 05/22/20 Culture, Wound        COVID-19 05/03/20 05/03/20 05/22/20 COVID-19  07/17/20      Detected 5/22/2020, 5/14/2020, 5/8/2020, 5/3/2020    Resolved    COVID-19 Rule Out 05/21/20 05/21/20 05/22/20 COVID-19 (Ordered)   05/22/20 Rule-Out Test Resulted    DETECTED 5/22/2020    COVID-19 Rule Out 05/14/20 05/15/20 05/14/20 Covid-19 Ambulatory (Ordered)   05/17/20 Rule-Out Test Resulted    Detected 5/14/2020    COVID-19 Rule Out 05/08/20 05/08/20 05/08/20 COVID-19 (Ordered)   05/08/20 Rule-Out Test Resulted    DETECTED 5/8/2020    C-diff Rule Out 05/08/20 05/08/20 05/11/20 CLOSTRIDIUM DIFFICILE EIA (Ordered)   05/12/20 Gwen Ledezma RN    Does not meet criteria    MRSA 05/03/20 05/06/20 05/03/20 Culture, Wound   05/06/20 Eugenia Nam RN    Wound buttock 5/3/20    COVID-19 Rule Out 05/03/20 05/03/20 05/03/20 COVID-19 (Ordered)   05/03/20 Rule-Out Test Resulted    DETECTED 5/3/2020          Nurse Assessment:  Last Vital Signs: BP (!) 135/56   Pulse 89   Temp 97.9 °F (36.6 °C) (Oral)   Resp 14   Ht 5' 1\" (1.549 m)   Wt 212 lb 11.2 oz (96.5 kg)   SpO2 99%   BMI 40.19 kg/m²     Last documented pain score (0-10 scale): Pain Level: 8  Last Weight:   Wt Readings from Last 1 Encounters:   05/27/20 212 lb 11.2 oz (96.5 kg)     Mental Status:  {IP PT MENTAL STATUS:20030}    IV Access:  { CIELO IV ACCESS:782923688}    Nursing Mobility/ADLs:  Walking   {P DME KNBT:323868643}  Transfer  {CHP DME DXFD:834508307}  Bathing  {P DME GDZN:974035159}  Dressing  {CHP DME BOXI:861034340}  Toileting  {CHP DME GJBU:419870711}  Feeding  {P DME VOSZ:057672976}  Med Admin  {P DME SZRW:933210524}  Med Delivery   { CIELO MED Delivery:675072783}    Wound Care Documentation and Therapy:  Wound 07/18/19 Ankle Left (Active)   Number of days: 313       Wound 05/03/20 Sacrum (Active)   Wound Image   5/22/2020  9:43 AM   Wound Pressure Stage  4 5/23/2020  8:00 AM   Dressing Status Clean;Dry; Intact 5/27/2020 12:03 AM   Dressing Changed Changed/New 5/26/2020  3:08 PM   Dressing/Treatment ABD; Moist to dry;Packing 5/26/2020  3:08 PM   Wound Cleansed Rinsed/Irrigated with saline 5/25/2020 12:00 AM   Dressing Change Due 05/25/20 5/24/2020 12:10 PM   Wound Length (cm) 20 cm 5/22/2020  9:43 AM   Wound Width (cm) 20 cm 5/22/2020  9:43 AM   Wound Depth (cm) 5 cm 5/22/2020  9:43 AM   Wound Surface Area (cm^2) 400 cm^2 5/22/2020  9:43 AM   Change in Wound Size % (l*w) -45.45 5/22/2020  9:43 AM   Wound Volume (cm^3) 2000 cm^3 5/22/2020  9:43 AM   Wound Healing % 4 5/22/2020  9:43 AM   Undermining Ends___ O'Clock 11 5/13/2020  8:30 AM   Undermining Maxium Distance (cm) 5 5/22/2020  9:43 AM   Wound Assessment Black;Dark edges; Red;Slough 5/26/2020  3:08 PM   Drainage Amount Large 5/26/2020  3:08 PM   Drainage Description Serosanguinous 5/26/2020  3:08 PM   Odor Mild 5/26/2020  3:08 PM   Margins Epibole (rolled edges) 5/22/2020  6:00 AM   Exposed structure Muscle 5/24/2020 12:10 PM   Carmen-wound Assessment Dark edges 5/24/2020 12:10 PM   Non-staged Wound Description Full thickness 5/22/2020  6:00 AM   [place]%Wound Bed 50 5/5/2020 11:29 AM   Red%Wound Bed 80 5/22/2020  9:43 AM   Yellow%Wound Bed 20 5/22/2020  9:43 AM   Culture Taken Yes 5/21/2020  9:00 PM   Number of days: 23       Wound 05/03/20 Abdomen Lower;Medial (Active)   Wound Image   5/22/2020  9:43 AM   Dressing Status Clean;Dry; Intact 5/27/2020 12:03 AM   Dressing Changed Changed/New 5/26/2020  8:54 AM   Dressing/Treatment Other (comment) 5/26/2020  8:54 AM   Wound Cleansed Rinsed/Irrigated with saline 5/25/2020 12:00 AM   Dressing Change Due 05/27/20 5/26/2020  8:54 AM   Wound Length (cm) 2 cm 5/13/2020  8:30 AM   Wound Width (cm) 6 cm 5/13/2020  8:30 AM   Wound Depth (cm) 0.1 cm 5/13/2020  8:30 AM   Wound Surface Area (cm^2) 12 cm^2 5/13/2020  8:30 AM   Change in Wound Size % (l*w) 23.08 5/13/2020  8:30 AM   Wound Volume (cm^3) 1.2 cm^3 5/13/2020  8:30 AM   Wound Healing % 23 5/13/2020  8:30 AM   Wound Assessment Fragile;Painful;Slough 5/26/2020  8:54 AM   Drainage Amount Scant 5/26/2020  8:54 AM   Drainage Description Foul purulent 5/25/2020  9:45 AM   Odor None 5/24/2020 12:10 PM   Carmen-wound Assessment Excoriated;Fragile 5/26/2020  8:54 AM   Non-staged Wound Description Partial thickness 5/22/2020  9:43 AM   [place]%Wound Bed 75 5/22/2020  9:43 AM   Red%Wound Bed 50 5/5/2020 11:29 AM   Yellow%Wound Bed 25 5/22/2020  9:43 AM   Number of days: 23       Wound 05/22/20 Left gluteal fold (Active)   Wound Image   5/22/2020  9:43 AM   Wound Deep tissue/Injury 5/23/2020  8:00 AM   Dressing Status Clean;Dry; Intact 5/27/2020 12:03 AM   Dressing Changed Changed/New 5/26/2020  3:08 PM   Dressing/Treatment Other (comment);ABD 5/26/2020  3:08 PM   Wound Cleansed Rinsed/Irrigated with saline 5/26/2020 3:08 PM   Dressing Change Due 05/27/20 5/27/2020 12:03 AM   Wound Length (cm) 2.3 cm 5/22/2020  9:43 AM   Wound Width (cm) 2 cm 5/22/2020  9:43 AM   Wound Surface Area (cm^2) 4.6 cm^2 5/22/2020  9:43 AM   Wound Assessment Dark edges;Fragile;Red;Slough 5/26/2020  3:08 PM   Drainage Amount Copious 5/26/2020  3:08 PM   Drainage Description Serosanguinous 5/26/2020  3:08 PM   Odor Mild 5/26/2020  3:08 PM   Carmen-wound Assessment Excoriated 5/26/2020  3:08 PM   Purple%Wound Bed 100 5/22/2020  9:43 AM   Number of days: 4       Wound 05/23/20 Back Right; Lower (Active)   Dressing Status Clean;Dry; Intact 5/27/2020 12:03 AM   Dressing Changed Changed/New 5/26/2020  3:08 PM   Dressing/Treatment Other (comment) 5/26/2020  3:08 PM   Wound Cleansed Rinsed/Irrigated with saline 5/26/2020  3:08 PM   Dressing Change Due 05/27/20 5/27/2020 12:03 AM   Wound Length (cm) 5 cm 5/23/2020  6:16 PM   Wound Width (cm) 0.5 cm 5/23/2020  6:16 PM   Wound Depth (cm) 0 cm 5/23/2020  6:16 PM   Wound Surface Area (cm^2) 2.5 cm^2 5/23/2020  6:16 PM   Wound Volume (cm^3) 0 cm^3 5/23/2020  6:16 PM   Wound Assessment Red 5/26/2020  3:08 PM   Drainage Amount None 5/24/2020 12:10 PM   Drainage Description Serosanguinous 5/26/2020  3:08 PM   Odor None 5/24/2020 12:10 PM   Carmen-wound Assessment Fragile 5/26/2020  3:08 PM   Number of days: 3       Wound 05/23/20 Femoral Anterior;Left;Proximal (Active)   Dressing Status Intact 5/26/2020  3:08 PM   Wound Cleansed Rinsed/Irrigated with saline 5/24/2020  7:30 PM   Number of days: 3        Elimination:  Continence:   · Bowel: {YES / PX:15930}  · Bladder: {YES / UO:94640}  Urinary Catheter: {Urinary Catheter:583217975}   Colostomy/Ileostomy/Ileal Conduit: {YES / OB:47021}       Date of Last BM: ***    Intake/Output Summary (Last 24 hours) at 5/27/2020 0823  Last data filed at 5/27/2020 0645  Gross per 24 hour   Intake 240 ml   Output 650 ml   Net -410 ml     I/O last 3 completed shifts:   In: 240 [P.O.:240]  Out: [facility] Concerns:     [address] CIELO Safety Concerns:639371567}    Impairments/Disabilities:      [place] [place]ments/Disabilities:673621688}    Nutrition Therapy:  Current Nutrition Therapy:   {[facility] Diet List:096442535}    Routes of Feeding: {[facility] DME Other Feedings:176882138}  Liquids: {Slp liquid thickness:14753}  Daily Fluid Restriction: {[facility] DME Yes amt example:891155930}  Last Modified Barium Swallow with Video (Video Swallowing Test): {Done Not Done YKPO:598314650}    Treatments at the Time of Hospital Discharge:   Respiratory Treatments: ***  Oxygen Therapy:  {Therapy; copd oxygen:85651}  Ventilator:    {[facility] Vent KTMN:953909341}    Rehab Therapies: {THERAPEUTIC INTERVENTION:5650789503}  Weight Bearing Status/Restrictions: {[facility] Weight Bearin}  Other Medical Equipment (for information only, NOT a DME order):  {EQUIPMENT:147391089}  Other Treatments: ***    Patient's personal belongings (please select all that are sent with patient):  {[facility] DME Belongings:560412234}    RN SIGNATURE:  {Esignature:854611422}    CASE MANAGEMENT/SOCIAL WORK SECTION    Inpatient Status Date: ***    Readmission Risk Assessment Score:  Readmission Risk              Risk of Unplanned Readmission:        55           Discharging to Facility/ Agency   · Name:   · Address:  · Phone:  · Fax:    Dialysis Facility (if applicable)   · Name:  · Address:  · Dialysis Schedule:  · Phone:  · Fax:    / signature: {Esignature:162881533}    PHYSICIAN SECTION    Prognosis: Fair    Condition at Discharge: Stable    Rehab Potential (if transferring to Rehab): Fair    Recommended Labs or Other Treatments After Discharge: ***    Physician Certification: I certify the above information and transfer of Daniel Enrique  is necessary for the continuing treatment of the diagnosis listed and that she requires {Admit to Appropriate Level of Care:78945} for {GREATER/LESS:605860915} 30 days.      Update Admission H&P: {[facility] DME Changes in SIYOL:046601656}    PHYSICIAN SIGNATURE:  {Esignature:783470864} Composite Graft Text: The defect edges were debeveled with a #15 scalpel blade.  Given the location of the defect, shape of the defect, the proximity to free margins and the fact the defect was full thickness a composite graft was deemed most appropriate.  The defect was outline and then transferred to the donor site.  A full thickness graft was then excised from the donor site. The graft was then placed in the primary defect, oriented appropriately and then sutured into place.  The secondary defect was then repaired using a primary closure.

## 2020-05-27 NOTE — ADT AUTH CERT
Gastrointestinal Bleeding, Upper - Care Day 4 (5/24/2020) by Madyson Solomon RN         Review Status Review Entered   Completed 5/26/2020 08:48       Criteria Review      Care Day: 4 Care Date: 5/24/2020 Level of Care:    Guideline Day 1    Level Of Care    (X) ICU [A] or floor    Clinical Status    ( ) * Clinical Indications met [B]    Routes    (X) IV fluids, medications    Interventions    (X) CBC    (X) PT/PTT    (X) Blood type and crossmatch    (X) Large-bore or central IV access    (X) Possible transfusion of RBC    (X) Possible fresh-frozen plasma/platelets    Medications    (X) IV proton pump inhibitor    (X) Antibiotic prophylaxis in cirrhotic patient    * Milestone   Additional Notes   05/24/20 0945  96.9 (36.1)  14  79  130/68  -  -  -  -  99  None (Room air)       5/24/2020 06:00   Sodium: 138   Potassium: 4.0   Chloride: 101   CO2: 28   BUN: 31 (H)   Creatinine: 2.5 (H)   Anion Gap: 9   GFR Non-: 19   GFR : 23   Magnesium: 1.8   Glucose: 220 (H)   Calcium: 7.0 (L)   Phosphorus: 3.7   Total Protein: 4.1 (L)   Albumin: 1.2 (L)   Alk Phos: 84   ALT: 30   AST: 27   Bilirubin: <0.2   Vancomycin Rm: 22.1   WBC: 8.4   RBC: 2.29 (L)   Hemoglobin Quant: 6.9 (L)   Hematocrit: 21.3 (L)   MCV: 93.0   MCH: 30.1   MCHC: 32.4   MPV: 11.1   RDW: 16.9 (H)   Platelet Count: 190 (L)   Neutrophils %: 75.5   Immature Granulocytes %: 3.2   Lymphocyte %: 9.6 (L)   Monocytes %: 8.0   Eosinophils %: 3.3   Basophils %: 0.4   Neutrophils Absolute: 6.32   Immature Granulocytes #: 0.27   Lymphocytes Absolute: 0.80 (L)   Monocytes Absolute: 0.67   Eosinophils Absolute: 0.28   Basophils Absolute: 0.03   Prothrombin Time: 11.5   INR: 1.0      5/24/2020 14:20   Total CK: 36      5/24/2020 19:30   CRP: 19.3 (H)   LD: 243 (H)   Hemoglobin Quant: 8.5 (L)   Hematocrit: 26.0 (L)   Ferritin: 965   Fibrinogen: 646 (H)   D-Dimer, Quant: 527      Scheduled Medications   · sodium chloride 20 mL Intravenous Once · daptomycin (CUBICIN) IVPB 400 mg Intravenous Q48H   · ertapenem (INVANZ) IVPB 500 mg Intravenous Daily   · miconazole Topical BID   · collagenase Topical Daily   · sucralfate 1 g Oral 4 times per day   · gabapentin 300 mg Oral Daily   · pantoprazole 40 mg Intravenous BID   · sodium chloride flush 10 mL Intravenous 2 times per day   · calcitRIOL 0.25 mcg Oral Daily   · fluconazole 200 mg Oral Daily   · vitamin C 500 mg Oral Daily   · zinc sulfate 100 mg Oral Daily       PRN Medications   HYDROmorphone x2, anticoagulant sodium citrate, sodium chloride flush, polyethylene glycol, hydrOXYzine, glucose, dextrose, glucagon (rDNA), dextrose, acetaminophen **OR** acetaminophen, oxyCODONE-acetaminophen x1         GenSurg:   Assessment/Plan:   78 y.o. female with multifactorial anemia, GIB s/p EGD 5/22 with healing prepyloric and D1 ulcer, large decubitus wound s/p debridement       PPI/Carafarte   Monitor H/H - slight down drift - tfuse 1U   Okay for PUD   Continue local wound care with santyl - wound care following   Will follow peripherally          IM:   1.  GI bleed  hgb 6.9 today 7.6 at midnight 8.8 at 6 pm               PPI carafate liq diet               No source idnetified       2.  S/ embolization 5-9-20               Successful arteriograms of the celiac, SMA, common hepatic, and GDA               . No active extravasation was identified.                Successful prophylactic embolization of the GDA.             2.  covid + zinc and vit c       3.  Decubitus ulcer -  large               - S/p debridement on 5/4               - Wound cx- Enterococcus, Candida, Proteus               - On Vancomycin, Zosyn and Fluconazole       4.  dm2               Decreased po intake               No coverage for now 220 today       5.  ckd   gfr 9               - Cr-2.9 on admission 31/2.5 today               - On HD                - BMP daily                - Nephrology following                   6.  htn               92/50             On no meds now       Plan:    Transfuse         ID:   Subjective: Patient was seen and examined. No chills, no abdominal pain, no diarrhea, no rash. Patient reports feeling like her gluteal area is burning.  No apparent rash noted   Had first doses of invanz and daptomycin    Patient is on room air.        Assessment:   Infected sacral pressure ulcer s/p bedside debridement on 05/03, excisional debridement on 05/04. Wound culture grew MRSA. Tissue culture grew Enterococcus avium, Proteus mirabilis, Candida albicans. SARS-CoV-2 infection       Recommendations:   · Antibiotics changed to Invanz and Daptomycin until 06/14 as previously planned. · Check CK total    · Adjusted for renal    · Continue local wound care. · Follow up plan for possible diverting colostomy after repeat COVID-19 testing becomes negative. · Labs reviewed       Nephro:   IMPRESSION/RECOMMENDATIONS:      1. CKD 4/5   Baseline 4.2, eGFR 10-12 ml/min in 2/2020, 3.1 in 8/2019 4/11/20 JOSUÉ showed no hydronephrosis   Worsening renal function with oliguria   IHD initiated 5/10   Treatment today        2. Sacral wound infection   S/p bedside sharp excisional debridement of sacral pressure ulcer on 05/03   Wound culture showing mixed gram-positive organisms, gram-negative rods, Proteus species.    ID following       3. Anemia   of CKD with superimposed acute blood loss due to GI bleed   EGD 5 /8 due to GIB, showed antral ulcer   GDA embolization by IR 5/9   Hgb 6.9 today, received 1 unit pRBCs        4. Secondary hyperparathyrodism of renal origin   Follow closely on vit d analogue due to sarcoid and h/o stones   On rocaltrol, phoslo   5/24 PO4 at goal <5.0, Ca++ 7.0 with low albumin of 1.2        5. Hyperkalemia with CKD   Will follow with dialysis       6.  Resp failure   covid pos   Pulmonary and ID following       Gastrointestinal Bleeding, Upper - Care Day 3 (5/23/2020) by Charanjit Patel RN         Review Status Review Entered Completed 5/26/2020 08:48       Criteria Review      Care Day: 3 Care Date: 5/23/2020 Level of Care:    Guideline Day 1    Level Of Care    (X) ICU [A] or floor    Clinical Status    ( ) * Clinical Indications met [B]    Routes    (X) IV fluids, medications    Interventions    (X) CBC    (X) PT/PTT    (X) Blood type and crossmatch    (X) Large-bore or central IV access    (X) Possible transfusion of RBC    (X) Possible fresh-frozen plasma/platelets    Medications    (X) IV proton pump inhibitor    (X) Antibiotic prophylaxis in cirrhotic patient    5/26/2020 8:48 AM EDT by andrzej Hung    * Milestone   Additional Notes   05/23/20 0400  97.3 (36.3)  10  82  113/62  -  -  -  -  99 % RA      5/23/2020 04:40   WBC: 11.2   RBC: 2.73 (L)   Hemoglobin Quant: 8.0 (L)   Hematocrit: 25.1 (L)   MCV: 91.9   MCH: 29.3   MCHC: 31.9 (L)   MPV: 10.7   RDW: 17.0 (H)   Platelet Count: 605   Neutrophils %: 74.0   Immature Granulocytes %: 3.9   Lymphocyte %: 9.6 (L)   Monocytes %: 8.4   Eosinophils %: 3.7   Basophils %: 0.4   Neutrophils Absolute: 8.30 (H)   Immature Granulocytes #: 0.44   Lymphocytes Absolute: 1.08 (L)   Monocytes Absolute: 0.94   Eosinophils Absolute: 0.42   Basophils Absolute: 0.05      5/23/2020 12:20   Hemoglobin Quant: 8.5 (L)   Hematocrit: 26.2 (L)      5/23/2020 18:15   Hemoglobin Quant: 8.8 (L)   Hematocrit: 27.0 (L)      Scheduled Meds:   · miconazole Topical BID   · collagenase Topical Daily   · sucralfate 1 g Oral 4 times per day   · gabapentin 300 mg Oral Daily   · vancomycin (VANCOCIN) intermittent dosing (placeholder) Other RX Placeholder   · pantoprazole 40 mg Intravenous BID   · sodium chloride flush 10 mL Intravenous 2 times per day   · calcitRIOL 0.25 mcg Oral Daily   · fluconazole 200 mg Oral Daily   · piperacillin-tazobactam 3.375 g Intravenous Q12H   · vitamin C 500 mg Oral Daily   · zinc sulfate 100 mg Oral Daily   · sodium chloride Intravenous Q12H      PRN Medications due to sarcoid and h/o stones   On rocaltrol, phoslo       5. Hyperkalemia with CKD   Improved with dailysis   Will follow       6. resp failure   covid pos   Per id          Critical Care:   Subjective    Patient seen this am. Discussed with nurse. Post transfusion hg- 8.5 this am.       Resident's Assessment and Plan       Antionette Manley is a 78 y.o. female with PMH of DM2, HTN, CKD, obesity, Sarcoidosis, Anemia, large decubitus ulcer s/p debridement and GIB s/p embolization 5/9/20 who was transferred from [facility] for Acute GIB and Hg of 5.6.  Currently COVID (+)x3       <Neurologic>   - AAOx3       <Cardiovascular>   Ho Hypertension   - Was on hydralazine - held   - BP stable       <Pulmonary>   Covid PNA   - On room air   - Last Covid 5/14- (+); Repeat Covid test this admission positive    - IL-6 ordered   - Ferritin 1,491, D-Dimer 730, , Fibrinogen 520   - ID currently following   - OnVitamin C and Zinc supplementation   - CXR for tomorrow       <Gastrointestinal>   Acute on chronic GIB   - GIB on 5/8; EGD 5/9- D2 Ulcer and Antral Ulcer; Embolization on 5/9   - Hg stable until this morning- around 8   - total  units received since 5/22 - 3   - Transfusion goal Keep Hb>7   - PPI BID   - Gen Surgery following: EGD, scan showed no source of bleed; recommend to continue ppi bid and carafate; clear liguid diet   - Keep NPO for now       Transaminitis - improving   - ALT 37, AST 67   - Continue to monitor daily CMP       <Infectious Disease>   Decubitus Ulcer   - S/p debridement on 5/4   - Wound cx- Enterococcus, Candida, Proteus   - On Vancomycin, Zosyn and Fluconazole    - ID following   - Consulted  Wound care   - Previously ID and GSY wanted to do diverting colostomy, awaiting for Covid (-) x2       Leukocytosis   - On Vancomycin, Zosyn and Fluconazole to be continued until 6/14    - Panculture sent   - CXR for tomorrow    - Daily CBC       <Endocrine>   NIDDM   - Last A1C 5/18- 6.9   - Lantus 10 U nightly in Micheal Smoker   - Currently NPO   - Will assess BGs and determine  Insulin requirements   - BG Q4H       <Genitourinary/Renal>   CKD   - Cr-2.9 on admission   - On HD    - BMP daily    - Nephrology following       <Hematology/Oncology>   Acute on Chronic Anemia   - likely 2/2 CKD, GIB   - Baseline hg- 8.5-9   - Received retacrit with HD   - H/H Q6H   - Transfuse if <7

## 2020-05-27 NOTE — PROGRESS NOTES
Subjective: The patient is awake and alert. No problems overnight. Denies chest pain, angina, and dyspnea. Denies abdominal pain. Tolerating diet. No nausea or vomiting. Transfering to [facility] today. Objective:    BP (!) 135/56   Pulse 89   Temp 97.9 °F (36.6 °C) (Oral)   Resp 14   Ht 5' 1\" (1.549 m)   Wt 212 lb 11.2 oz (96.5 kg)   SpO2 99%   BMI 40.19 kg/m²     Heart:  RRR, no murmurs, gallops, or rubs.   Lungs:  CTA bilaterally, no wheeze, rales or rhonchi  Abd: bowel sounds present, nontender, nondistended, no masses  Extrem:  No clubbing, cyanosis, or edema  Neuro: intact    CBC with Differential:    Lab Results   Component Value Date    WBC 7.7 05/27/2020    RBC 2.63 05/27/2020    HGB 7.8 05/27/2020    HCT 24.8 05/27/2020     05/27/2020    MCV 94.3 05/27/2020    MCH 29.7 05/27/2020    MCHC 31.5 05/27/2020    RDW 17.0 05/27/2020    NRBC 0.9 05/25/2020    SEGSPCT 71 08/16/2013    BANDSPCT 1 03/29/2016    METASPCT 0.9 05/10/2020    LYMPHOPCT 13.0 05/27/2020    PROMYELOPCT 0.9 05/09/2020    MONOPCT 8.5 05/27/2020    MYELOPCT 1.7 05/11/2020    BASOPCT 0.3 05/27/2020    MONOSABS 0.65 05/27/2020    LYMPHSABS 1.00 05/27/2020    EOSABS 0.29 05/27/2020    BASOSABS 0.02 05/27/2020     CMP:    Lab Results   Component Value Date     05/27/2020    K 3.6 05/27/2020    K 5.3 05/03/2020     05/27/2020    CO2 27 05/27/2020    BUN 22 05/27/2020    CREATININE 2.5 05/27/2020    GFRAA 23 05/27/2020    LABGLOM 19 05/27/2020    GLUCOSE 205 05/27/2020    GLUCOSE 149 10/12/2011    PROT 4.1 05/27/2020    LABALBU 1.4 05/27/2020    LABALBU 4.1 10/12/2011    CALCIUM 7.6 05/27/2020    BILITOT <0.2 05/27/2020    ALKPHOS 94 05/27/2020    AST 14 05/27/2020    ALT 16 05/27/2020        Assessment:    Patient Active Problem List   Diagnosis    Neurologic gait dysfunction    [facility] chronic [facility]    Diabetes mellitus ([facility]    Hypertension    Arthritis    Kidney disease    Acute blood loss anemia  Knee problem    Anemia due to stage 3 chronic kidney disease (HCC)    [facility]) [facility]    Primary osteoarthritis of both knees    Acute on chronic renal insufficiency    Acute renal failure (HCC)    Metabolic acidosis    Acute renal failure superimposed on chronic kidney disease, on chronic dialysis ([facility]    Sepsis ([facility]    2019 novel coronavirus disease (COVID-19)    GI bleed    Severe protein-calorie malnutrition ([facility]       Plan:  Transfer to [facility]  8:21 AM  5/27/2020

## 2020-05-27 NOTE — PROGRESS NOTES
[facility] called and patient has a bed in [address] [facility] bed 18 nurse to nurse number is [phone].  Tc Arvizu

## 2020-05-27 NOTE — PROGRESS NOTES
Notified Emmie Burks at [facility] that patient got a bed at [facility] and needs transport set up.    Mignon Varner

## 2020-05-27 NOTE — PROGRESS NOTES
Patient's daughter Faby Burdick notified of patient's discharge to [facility]. Bed number and nurse number given to Faby Burdick.    Rakan Bhardwaj

## 2020-05-31 NOTE — DISCHARGE SUMMARY
Admit Date: 4/11/2020 10:00 AM   Discharge Date: 4/15/2020    Patient Active Problem List   Diagnosis    Neurologic gait dysfunction    Renal failure, acute on chronic (HCC)    Diabetes mellitus ([facility]    Hypertension    Arthritis    Kidney disease    Acute blood loss anemia    Knee problem    Anemia due to stage 3 chronic kidney disease (HCC)    PERLA (acute kidney injury) ([facility]    Primary osteoarthritis of both knees    Acute on chronic renal insufficiency    Acute renal failure (HCC)    Metabolic acidosis    Acute renal failure superimposed on chronic kidney disease, on chronic dialysis ([address]    Sepsis ([facility]    2019 novel coronavirus disease (COVID-19)    GI bleed    Severe protein-calorie malnutrition ([facility]        Present on Admission:   Acute renal failure superimposed on chronic kidney disease, on chronic dialysis ([facility]        Mercedez Marie, [facility]   Home Medication Instructions MARIANO:[id number]    Printed on:05/31/20 1010   Medication Information                      calcitRIOL (ROCALTROL) 0.25 MCG capsule  Take 1 capsule by mouth daily                  Hospital Course/Procedures:78year-old with chronic kidney disease, hypertension, diabetes and severe nonoperable knee osteoarthritis was admitted on 4/11/2020 due to multiple falls at home and due to worsening renal function. Blood urea nitrogen was 70 and creatinine was 4.8 on admission which is up from her baseline creatinine of about 3.0. Also possibilities of UTI. Patient was admitted and placed on IV fluids with improvement of her renal function back close to her baseline. Nephrology was consulted. Renal ultrasound showed no obstruction. Orthopedics will consult due to shoulder and knee pain. Did not recommend cortisone shot due to elevated white count. Due to patient's inability to care for herself at home she was discharged in stable condition on 4/15/2022 skilled nursing facility.     Consultants Following:nephrology orthopedic

## 2020-06-07 LAB
FUNGUS (MYCOLOGY) CULTURE: ABNORMAL
FUNGUS STAIN: ABNORMAL
ORGANISM: ABNORMAL

## 2020-06-23 LAB
AFB CULTURE (MYCOBACTERIA): NORMAL
AFB SMEAR: NORMAL

## 2020-06-29 NOTE — DISCHARGE SUMMARY
Admit Date: 5/21/2020  8:26 PM   Discharge Date: 5/27/2020    Patient Active Problem List   Diagnosis    Neurologic gait dysfunction    [facility] failure[facility] acute [facility] chronic ([facility]    Diabetes mellitus ([facility]    Hypertension    Arthritis    Kidney disease    Acute blood loss anemia    Knee problem    Anemia due to stage 3 chronic kidney disease ([facility]    PERLA (acute kidney injury) ([facility]    Primary osteoarthritis of both knees    Acute on chronic renal insufficiency    Acute renal failure (HCC)    Metabolic acidosis    Acute renal failure superimposed on chronic kidney disease, on chronic dialysis ([facility]    Sepsis ([facility]    2019 novel coronavirus disease (COVID-19)    GI bleed    Severe protein-calorie malnutrition ([facility]        Present on Admission:   GI bleed   Severe protein-calorie malnutrition (HCC)   Acute blood loss anemia        [facility]   Home Medication Instructions DCB:[id number]    Printed on:06/29/20 9579   Medication Information                      acetaminophen (TYLENOL) 325 MG tablet  Take 650 mg by mouth every 6 hours as needed for Pain             anticoagulant sodium citrate 4 GM/100ML SOLN  0.16 g by Intracatheter route as needed (cath care post HD)             calcitRIOL (ROCALTROL) 0.25 MCG capsule  Take 1 capsule by mouth daily             glucagon, rDNA, 1 MG injection  Inject 1 mg into the muscle as needed for Low blood sugar (Blood glucose less than 70 mg/dL and patient NOT ALERT or NPO and does not have IV access.)             insulin lispro (HUMALOG) 100 UNIT/ML injection vial  Inject 0-3 Units into the skin nightly             insulin lispro (HUMALOG) 100 UNIT/ML injection vial  Inject 0-6 Units into the skin 3 times daily (with meals)             miconazole (MICOTIN) 2 % powder  Apply topically 2 times daily.              sucralfate (CARAFATE) 1 GM tablet  Take 1 tablet by mouth 4 times daily             vitamin C (VITAMIN C) 500 MG tablet  Take 1 tablet by mouth daily zinc sulfate (ZINCATE) 220 (50 Zn) MG capsule  Take 2 capsules by mouth daily                  Hospital Course/Procedures:78year-old recently admitted with GI bleed and underwent embolization [facility] secondary to a hemoglobin of 5.6. Patient was also Covid positive. She denied any abdominal pain but did have rectal bleeding. She also had a large sacral decubitus ulcer that was debridement on her previous admissions. She denied any chest pain or shortness of breath. Patient was admitted to telemetry she underwent blood transfusion. EGD on 5/22/2020 showed a healing pyloric ulcer and some mild gastritis with no bleeding. Patient was placed on proton pump inhibitor twice a day and Carafate. Hemoglobins were followed and remained stable. Patient and family requested transfer to [facility] and she was discharged there in stable condition on 5/27/2020 for further evaluation and care. Consultants Following:Gen.  Surgery, nephrology    Disposition:transferred to the [facility] in stable condition    Follow-up:she'll be seen at the [facility] clinic by their medical team      Lexis Mack  6/29/2020  9:15 AM

## 2020-08-11 ENCOUNTER — HOSPITAL ENCOUNTER (EMERGENCY)
Age: 70
Discharge: HOME | End: 2020-08-11
Attending: EMERGENCY MEDICINE
Payer: MEDICARE

## 2020-08-11 VITALS
WEIGHT: 212 LBS | OXYGEN SATURATION: 98 % | BODY MASS INDEX: 40.06 KG/M2 | HEART RATE: 88 BPM | RESPIRATION RATE: 18 BRPM | DIASTOLIC BLOOD PRESSURE: 60 MMHG | TEMPERATURE: 96.9 F | SYSTOLIC BLOOD PRESSURE: 128 MMHG

## 2020-08-11 LAB
ALBUMIN SERPL-MCNC: 2.4 G/DL (ref 3.5–5.2)
ALP BLD-CCNC: 95 U/L (ref 35–104)
ALT SERPL-CCNC: 7 U/L (ref 0–32)
ANION GAP SERPL CALCULATED.3IONS-SCNC: 10 MMOL/L (ref 7–16)
ANION GAP SERPL CALCULATED.3IONS-SCNC: 10 MMOL/L (ref 7–16)
AST SERPL-CCNC: 13 U/L (ref 0–31)
BACTERIA: ABNORMAL /HPF
BASOPHILS ABSOLUTE: 0.05 E9/L (ref 0–0.2)
BASOPHILS RELATIVE PERCENT: 0.5 % (ref 0–2)
BILIRUB SERPL-MCNC: <0.2 MG/DL (ref 0–1.2)
BILIRUBIN URINE: NEGATIVE
BLOOD, URINE: NEGATIVE
BUN BLDV-MCNC: 57 MG/DL (ref 8–23)
BUN BLDV-MCNC: 62 MG/DL (ref 8–23)
CALCIUM SERPL-MCNC: 8.9 MG/DL (ref 8.6–10.2)
CALCIUM SERPL-MCNC: 9.6 MG/DL (ref 8.6–10.2)
CHLORIDE BLD-SCNC: 105 MMOL/L (ref 98–107)
CHLORIDE BLD-SCNC: 110 MMOL/L (ref 98–107)
CLARITY: CLEAR
CO2: 23 MMOL/L (ref 22–29)
CO2: 25 MMOL/L (ref 22–29)
COLOR: YELLOW
CREAT SERPL-MCNC: 3 MG/DL (ref 0.5–1)
CREAT SERPL-MCNC: 3.3 MG/DL (ref 0.5–1)
EOSINOPHILS ABSOLUTE: 0.16 E9/L (ref 0.05–0.5)
EOSINOPHILS RELATIVE PERCENT: 1.6 % (ref 0–6)
EPITHELIAL CELLS, UA: ABNORMAL /HPF
GFR AFRICAN AMERICAN: 17
GFR AFRICAN AMERICAN: 19
GFR NON-AFRICAN AMERICAN: 14 ML/MIN/1.73
GFR NON-AFRICAN AMERICAN: 15 ML/MIN/1.73
GLUCOSE BLD-MCNC: 124 MG/DL (ref 74–99)
GLUCOSE BLD-MCNC: 189 MG/DL (ref 74–99)
GLUCOSE URINE: NEGATIVE MG/DL
HCT VFR BLD CALC: 25.8 % (ref 34–48)
HEMOGLOBIN: 7.9 G/DL (ref 11.5–15.5)
IMMATURE GRANULOCYTES #: 0.06 E9/L
IMMATURE GRANULOCYTES %: 0.6 % (ref 0–5)
KETONES, URINE: NEGATIVE MG/DL
LEUKOCYTE ESTERASE, URINE: NEGATIVE
LYMPHOCYTES ABSOLUTE: 0.99 E9/L (ref 1.5–4)
LYMPHOCYTES RELATIVE PERCENT: 10 % (ref 20–42)
MCH RBC QN AUTO: 32.1 PG (ref 26–35)
MCHC RBC AUTO-ENTMCNC: 30.6 % (ref 32–34.5)
MCV RBC AUTO: 104.9 FL (ref 80–99.9)
MONOCYTES ABSOLUTE: 0.67 E9/L (ref 0.1–0.95)
MONOCYTES RELATIVE PERCENT: 6.8 % (ref 2–12)
NEUTROPHILS ABSOLUTE: 7.95 E9/L (ref 1.8–7.3)
NEUTROPHILS RELATIVE PERCENT: 80.5 % (ref 43–80)
NITRITE, URINE: NEGATIVE
PDW BLD-RTO: 15.5 FL (ref 11.5–15)
PH UA: 7.5 (ref 5–9)
PLATELET # BLD: 318 E9/L (ref 130–450)
PMV BLD AUTO: 10.2 FL (ref 7–12)
POTASSIUM SERPL-SCNC: 5.4 MMOL/L (ref 3.5–5)
POTASSIUM SERPL-SCNC: 6 MMOL/L (ref 3.5–5)
PROTEIN UA: 100 MG/DL
RBC # BLD: 2.46 E12/L (ref 3.5–5.5)
RBC UA: ABNORMAL /HPF (ref 0–2)
SODIUM BLD-SCNC: 140 MMOL/L (ref 132–146)
SODIUM BLD-SCNC: 143 MMOL/L (ref 132–146)
SPECIFIC GRAVITY UA: 1.02 (ref 1–1.03)
TOTAL PROTEIN: 6.1 G/DL (ref 6.4–8.3)
UROBILINOGEN, URINE: 0.2 E.U./DL
WBC # BLD: 9.9 E9/L (ref 4.5–11.5)
WBC UA: ABNORMAL /HPF (ref 0–5)
YEAST: PRESENT /HPF

## 2020-08-11 PROCEDURE — 96361 HYDRATE IV INFUSION ADD-ON: CPT

## 2020-08-11 PROCEDURE — 80053 COMPREHEN METABOLIC PANEL: CPT

## 2020-08-11 PROCEDURE — 2580000003 HC RX 258: Performed by: EMERGENCY MEDICINE

## 2020-08-11 PROCEDURE — 85025 COMPLETE CBC W/AUTO DIFF WBC: CPT

## 2020-08-11 PROCEDURE — 99284 EMERGENCY DEPT VISIT MOD MDM: CPT

## 2020-08-11 PROCEDURE — 81001 URINALYSIS AUTO W/SCOPE: CPT

## 2020-08-11 PROCEDURE — 80048 BASIC METABOLIC PNL TOTAL CA: CPT

## 2020-08-11 PROCEDURE — 93005 ELECTROCARDIOGRAM TRACING: CPT | Performed by: EMERGENCY MEDICINE

## 2020-08-11 PROCEDURE — 99283 EMERGENCY DEPT VISIT LOW MDM: CPT

## 2020-08-11 PROCEDURE — 96360 HYDRATION IV INFUSION INIT: CPT

## 2020-08-11 PROCEDURE — 6370000000 HC RX 637 (ALT 250 FOR IP): Performed by: EMERGENCY MEDICINE

## 2020-08-11 RX ORDER — FERROUS SULFATE 325(65) MG
325 TABLET ORAL
Status: ON HOLD | COMMUNITY
End: 2020-10-10

## 2020-08-11 RX ORDER — THIAMINE MONONITRATE (VIT B1) 100 MG
200 TABLET ORAL DAILY
COMMUNITY
End: 2021-05-03 | Stop reason: ALTCHOICE

## 2020-08-11 RX ORDER — INSULIN GLARGINE 100 [IU]/ML
10 INJECTION, SOLUTION SUBCUTANEOUS NIGHTLY
Status: ON HOLD | COMMUNITY
End: 2022-07-12 | Stop reason: HOSPADM

## 2020-08-11 RX ORDER — OXYCODONE AND ACETAMINOPHEN 7.5; 325 MG/1; MG/1
1 TABLET ORAL EVERY 8 HOURS PRN
Status: ON HOLD | COMMUNITY
End: 2022-07-12 | Stop reason: HOSPADM

## 2020-08-11 RX ORDER — 0.9 % SODIUM CHLORIDE 0.9 %
1000 INTRAVENOUS SOLUTION INTRAVENOUS ONCE
Status: COMPLETED | OUTPATIENT
Start: 2020-08-11 | End: 2020-08-11

## 2020-08-11 RX ORDER — PANTOPRAZOLE SODIUM 40 MG/1
40 TABLET, DELAYED RELEASE ORAL 2 TIMES DAILY
COMMUNITY
Start: 2020-08-11 | End: 2020-08-13

## 2020-08-11 RX ORDER — AMLODIPINE BESYLATE 10 MG/1
10 TABLET ORAL DAILY
COMMUNITY
End: 2021-11-17

## 2020-08-11 RX ORDER — HYDROCODONE BITARTRATE AND ACETAMINOPHEN 5; 325 MG/1; MG/1
1 TABLET ORAL ONCE
Status: COMPLETED | OUTPATIENT
Start: 2020-08-11 | End: 2020-08-11

## 2020-08-11 RX ORDER — UREA 10 %
3 LOTION (ML) TOPICAL NIGHTLY PRN
COMMUNITY
End: 2021-05-03 | Stop reason: ALTCHOICE

## 2020-08-11 RX ORDER — ALOGLIPTIN 25 MG/1
25 TABLET, FILM COATED ORAL DAILY
Status: ON HOLD | COMMUNITY
End: 2020-10-10

## 2020-08-11 RX ORDER — DOCUSATE SODIUM 100 MG/1
100 CAPSULE, LIQUID FILLED ORAL 2 TIMES DAILY
Status: ON HOLD | COMMUNITY
End: 2021-01-08

## 2020-08-11 RX ORDER — BUSPIRONE HYDROCHLORIDE 5 MG/1
5 TABLET ORAL NIGHTLY
COMMUNITY

## 2020-08-11 RX ORDER — SENNA PLUS 8.6 MG/1
2 TABLET ORAL 2 TIMES DAILY
COMMUNITY

## 2020-08-11 RX ORDER — SODIUM POLYSTYRENE SULFONATE 15 G/60ML
30 SUSPENSION ORAL; RECTAL ONCE
Status: ON HOLD | COMMUNITY
End: 2020-10-10

## 2020-08-11 RX ORDER — ONDANSETRON 4 MG/1
4 TABLET, FILM COATED ORAL EVERY 6 HOURS PRN
COMMUNITY
End: 2021-05-03 | Stop reason: ALTCHOICE

## 2020-08-11 RX ORDER — LISINOPRIL 10 MG/1
10 TABLET ORAL DAILY
Status: ON HOLD | COMMUNITY
End: 2020-10-10

## 2020-08-11 RX ORDER — POLYETHYLENE GLYCOL 3350 17 G/17G
17 POWDER, FOR SOLUTION ORAL DAILY
COMMUNITY

## 2020-08-11 RX ADMIN — SODIUM CHLORIDE 1000 ML: 9 INJECTION, SOLUTION INTRAVENOUS at 13:45

## 2020-08-11 RX ADMIN — HYDROCODONE BITARTRATE AND ACETAMINOPHEN 1 TABLET: 5; 325 TABLET ORAL at 14:09

## 2020-08-11 RX ADMIN — SODIUM CHLORIDE 1000 ML: 9 INJECTION, SOLUTION INTRAVENOUS at 11:41

## 2020-08-11 ASSESSMENT — PAIN SCALES - GENERAL: PAINLEVEL_OUTOF10: 9

## 2020-08-11 NOTE — ED PROVIDER NOTES
Department of Emergency Medicine   ED  Provider Note  Admit Date/RoomTime: 2020 11:18 AM  ED Room:           History of Present Illness:  20, Time: 11:56 AM EDT  Chief Complaint   Patient presents with    Abnormal Lab     creat 3.9, bun 63                Emely Middleton is a 78 y.o. female presenting to the ED for abnormal lab. Patient presents from a nursing facility. She had routine labs done which showed that she had elevated creatinine of 3.9 with a BUN of 63. Baselines about 2.5. This was found today, nothing makes it better or worse. She does have a chronic Castelan. She has no symptoms or complaints. She denies any fever, chills, nausea, vomiting, change in bowel bladder, cough, sputum, or any other symptoms or complaints. Review of Systems:   Pertinent positives and negatives are stated within HPI, all other systems reviewed and are negative.        --------------------------------------------- PAST HISTORY ---------------------------------------------  Past Medical History:  has a past medical history of Arthritis, Chronic knee pain, Depression, Diabetes mellitus ([address] Gall stones, Hypertension, Kidney disease, Kidney stones, Obesity, Sarcoidosis, SOBOE (shortness of breath on exertion), Tremor, and Urinary anomaly. Past Surgical History:  has a past surgical history that includes Foot surgery (2009 ); Cystocopy (2011 );  section (x3); Upper gastrointestinal endoscopy (2.18.15); Cholecystectomy (3/31/16); Abdomen surgery (N/A, 2020); Upper gastrointestinal endoscopy (N/A, 2020); and Upper gastrointestinal endoscopy (N/A, 2020). Social History:  reports that she quit smoking about 9 years ago. She has a 30.00 pack-year smoking history. She has never used smokeless tobacco. She reports that she does not drink alcohol or use drugs. Family History: family history includes Cancer in her sister. . Unless otherwise noted, family history is non contributory    The patients home medications have been reviewed. Allergies: Patient has no known allergies. ---------------------------------------------------PHYSICAL EXAM--------------------------------------    Constitutional/General: Alert and oriented x3  Head: Normocephalic and atraumatic  Eyes: PERRL, EOMI, sclera non icteric  Mouth: Oropharynx clear, handling secretions, no trismus, no asymmetry of the posterior oropharynx or uvular edema  Neck: Supple, full ROM, no stridor, no meningeal signs  Respiratory: Lungs clear to auscultation bilaterally, no wheezes, rales, or rhonchi. Not in respiratory distress  Cardiovascular:  Regular rate. Regular rhythm. 2+ distal pulses. Equal extremity pulses. Chest: No chest wall tenderness  GI:  Abdomen Soft, Non tender, Non distended. No rebound, guarding, or rigidity. No pulsatile masses. Musculoskeletal: Moves all extremities x 4. Warm and well perfused, no clubbing, cyanosis, or edema. Capillary refill <3 seconds  Integument: skin warm and dry. Large decubitus ulcer  Neurologic: GCS 15, no focal deficits, symmetric strength 5/5 in the upper and lower extremities bilaterally  Psychiatric: Normal Affect          -------------------------------------------------- RESULTS -------------------------------------------------  I have personally reviewed all laboratory and imaging results for this patient. Results are listed below.      LABS: (Lab results interpreted by me)  Results for orders placed or performed during the hospital encounter of 08/11/20   CBC Auto Differential   Result Value Ref Range    WBC 9.9 4.5 - 11.5 E9/L    RBC 2.46 (L) 3.50 - 5.50 E12/L    Hemoglobin 7.9 (L) 11.5 - 15.5 g/dL    Hematocrit 25.8 (L) 34.0 - 48.0 %    .9 (H) 80.0 - 99.9 fL    MCH 32.1 26.0 - 35.0 pg    MCHC 30.6 (L) 32.0 - 34.5 %    RDW 15.5 (H) 11.5 - 15.0 fL    Platelets 121 634 - 266 E9/L    MPV 10.2 7.0 - 12.0 fL    Neutrophils % 80.5 (H) 43.0 - 80.0 %    Immature mmol/L    Glucose 124 (H) 74 - 99 mg/dL    BUN 57 (H) 8 - 23 mg/dL    CREATININE 3.0 (H) 0.5 - 1.0 mg/dL    GFR Non-African American 15 >=60 mL/min/1.73    GFR African American 19     Calcium 8.9 8.6 - 10.2 mg/dL   ,       RADIOLOGY:  Interpreted by Radiologist unless otherwise specified  No orders to display         EKG Interpretation  Interpreted by emergency department physician, Dr. Millie Martinez           ------------------------- NURSING NOTES AND VITALS REVIEWED ---------------------------   The nursing notes within the ED encounter and vital signs as below have been reviewed by myself  /60   Pulse 88   Temp 96.9 °F (36.1 °C)   Resp 18   Wt 212 lb (96.2 kg)   SpO2 98%   BMI 40.06 kg/m²     Oxygen Saturation Interpretation: Normal    The patients available past medical records and past encounters were reviewed. ------------------------------ ED COURSE/MEDICAL DECISION MAKING----------------------  Medications   0.9 % sodium chloride bolus (0 mLs Intravenous Stopped 8/11/20 1256)   0.9 % sodium chloride bolus (0 mLs Intravenous Stopped 8/11/20 1446)   HYDROcodone-acetaminophen (NORCO) 5-325 MG per tablet 1 tablet (1 tablet Oral Given 8/11/20 1409)           The cardiac monitor revealed sinus with a heart rate in the 80s as interpreted by me. The cardiac monitor was ordered secondary to the patient's dehydration and to monitor the patient for dysrhythmia. CPT [id number]         Medical Decision Making:    Patient received IV fluids. Labs reviewed. Patient received additional IV fluids. Discussed with patient's PCP Dr. Meño Adames, he says he knows the patient very well, he states that her creatinine tends to fluctuate between 2.5 and 3.5,  He request we give her additional fluids, recheck, and if her numbers are improving, we will follow-up with her on the outpatient. . This was done, repeat BMP is improved.   Therefore, patient was discharged, she is to follow-up with her PCP in 1 to 2 days, she is educated on signs and symptoms that require emergent evaluation. Counseling: The emergency provider has spoken with the patient and discussed todays results, in addition to providing specific details for the plan of care and counseling regarding the diagnosis and prognosis. Questions are answered at this time and they are agreeable with the plan.       --------------------------------- IMPRESSION AND DISPOSITION ---------------------------------    IMPRESSION  1. PERLA (acute kidney injury) ([facility]    2. Dehydration        DISPOSITION  Disposition: Discharge to home  Patient condition is stable        NOTE: This report was transcribed using voice recognition software.  Every effort was made to ensure accuracy; however, inadvertent computerized transcription errors may be present        Sri Tabares MD  08/11/20 2390

## 2020-08-11 NOTE — CONSULTS
SpO2 98% on room air. 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 Hawa Moreno    CYSTOSCOPY  2011     for kidney stones    FOOT SURGERY  2009     right     UPPER GASTROINTESTINAL ENDOSCOPY  2.18.15    Dr. Susanna Omalley Findings: Mild Gastrits and Duodenitis, 2cm Hiatal Hernia    UPPER GASTROINTESTINAL ENDOSCOPY N/A 5/8/2020    EGD CONTROL HEMORRHAGE performed by [facility]u Melendez MD at [facility] N/A 5/22/2020    EGD BEDSIDE performed by Justen Sampson MD at [facility]       Family History   Problem Relation Age of Onset    Cancer Sister         reports that she quit smoking about 9 years ago. She has a 30.00 pack-year smoking history. She has never used smokeless tobacco. She reports that she does not drink alcohol or use drugs. Allergies:  Patient has no known allergies. Current Medications:    0.9 % sodium chloride bolus, Once  HYDROcodone-acetaminophen (NORCO) 5-325 MG per tablet 1 tablet, Once        Review of Systems:   Pertinent items are noted in HPI.     Physical exam:   Constitutional:    Vitals: VITALS:  /60   Pulse 88   Temp 96.9 °F (36.1 °C)   Resp 18   Wt 212 lb (96.2 kg)   SpO2 98%   BMI 40.06 kg/m²   24HR INTAKE/OUTPUT:  No intake or output data in the 24 hours ending 08/11/20 1359  URINARY CATHETER OUTPUT (Castelan):     Skin: no rash, turgor wnl  Heent:  eomi, mmm  Neck: no bruits or jvd noted  Cardiovascular:  S1, S2 without m/r/g  Respiratory: CTA B without w/r/r  Abdomen:  +bs, soft, nt, nd  Ext: negative lower extremity edema  Psychiatric: mood and affect appropriate  Musculoskeletal:  Rom, muscular strength intact    Data:   Labs:  CBC:   Lab Results   Component Value Date    WBC 9.9 08/11/2020    RBC 2.46 08/11/2020    HGB 7.9 08/11/2020    HCT 25.8 08/11/2020    .9 08/11/2020    MCH 32.1 08/11/2020    MCHC 30.6 08/11/2020    RDW 15.5 08/11/2020     08/11/2020    MPV 10.2 08/11/2020     CMP:    Lab Results   Component Value Date     08/11/2020    K 6.0 08/11/2020    K 5.3 05/03/2020     08/11/2020    CO2 25 08/11/2020    BUN 62 08/11/2020    CREATININE 3.3 08/11/2020    GFRAA 17 08/11/2020    LABGLOM 14 08/11/2020    GLUCOSE 189 08/11/2020    GLUCOSE 149 10/12/2011    PROT 6.1 08/11/2020    LABALBU 2.4 08/11/2020    LABALBU 4.1 10/12/2011    CALCIUM 9.6 08/11/2020    BILITOT <0.2 08/11/2020    ALKPHOS 95 08/11/2020    AST 13 08/11/2020    ALT 7 08/11/2020     BMP:    Lab Results   Component Value Date     08/11/2020    K 6.0 08/11/2020    K 5.3 05/03/2020     08/11/2020    CO2 25 08/11/2020    BUN 62 08/11/2020    LABALBU 2.4 08/11/2020    LABALBU 4.1 10/12/2011    CREATININE 3.3 08/11/2020    CALCIUM 9.6 08/11/2020    GFRAA 17 08/11/2020    LABGLOM 14 08/11/2020    GLUCOSE 189 08/11/2020    GLUCOSE 149 10/12/2011     Hepatic Function Panel:    Lab Results   Component Value Date    ALKPHOS 95 08/11/2020    ALT 7 08/11/2020    AST 13 08/11/2020    PROT 6.1 08/11/2020    BILITOT <0.2 08/11/2020    BILIDIR 0.8 06/25/2011    LABALBU 2.4 08/11/2020    LABALBU 4.1 10/12/2011     Albumin:    Lab Results   Component Value Date    LABALBU 2.4 08/11/2020    LABALBU 4.1 10/12/2011     Calcium:    Lab Results   Component Value Date    CALCIUM 9.6 08/11/2020     Ionized Calcium:    Lab Results   Component Value Date    IONCA 1.59 06/26/2013     Magnesium:    Lab Results   Component Value Date    MG 1.8 05/27/2020     Phosphorus:    Lab Results   Component Value Date    PHOS 3.2 05/27/2020     Uric Acid:    Lab Results   Component Value Date    LABURIC 7.7 07/19/2019    URICACID 11.6 10/12/2011     PT/INR:    Lab Results   Component Value Date    PROTIME 11.3 05/27/2020    PROTIME 15.3 06/25/2011    INR 1.0 05/27/2020     Last 3 Troponin:    Lab Results   Component Value Date    TROPONINI 0.11 05/21/2020    TROPONINI 0.12 04/11/2020    TROPONINI 0.19 02/01/2020     U/A:    Lab Results   Component Value Date    COLORU Yellow 08/11/2020    PROTEINU 100 08/11/2020    PHUR 7.5 08/11/2020    WBCUA 0-1 08/11/2020    WBCUA 5-10 08/17/2011    RBCUA 0-1 08/11/2020    RBCUA 0-1 06/26/2013    YEAST Present 08/11/2020    BACTERIA FEW 08/11/2020    CLARITYU Clear 08/11/2020    SPECGRAV 1.020 08/11/2020    LEUKOCYTESUR Negative 08/11/2020    UROBILINOGEN 0.2 08/11/2020    BILIRUBINUR Negative 08/11/2020    BILIRUBINUR NEGATIVE 08/17/2011    BLOODU Negative 08/11/2020    GLUCOSEU Negative 08/11/2020    GLUCOSEU NEGATIVE 08/17/2011    AMORPHOUS FEW 06/25/2011     ABG:    Lab Results   Component Value Date    PH 7.433 05/10/2020    PH 7.377 06/27/2011    PCO2 31.0 05/10/2020    PO2 106.3 05/10/2020    HCO3 20.3 05/10/2020    BE -3.2 05/10/2020    O2SAT 97.5 05/10/2020     HgBA1c:    Lab Results   Component Value Date    LABA1C 6.9 05/18/2020     Microalbumen/Creatinine ratio:  No components found for: RUCREAT  FLP:    Lab Results   Component Value Date    TRIG 181 10/12/2011    HDL 37.0 10/12/2011    LDLCALC 118 10/12/2011     TSH:    Lab Results   Component Value Date    TSH 1.945 08/16/2013     VITAMIN B12: No components found for: B12  FOLATE:    Lab Results   Component Value Date    FOLATE 2.6 05/04/2020     IRON:    Lab Results   Component Value Date    IRON 38 05/04/2020     Iron Saturation:  No components found for: PERCENTFE  TIBC:    Lab Results   Component Value Date    TIB 69

## 2020-08-11 NOTE — ED NOTES
[facility] will be here within the hour, report called to nursing home.       Cathy Watson RN  08/11/20 1918

## 2020-08-12 LAB
EKG ATRIAL RATE: 96 BPM
EKG P AXIS: 61 DEGREES
EKG P-R INTERVAL: 196 MS
EKG Q-T INTERVAL: 406 MS
EKG QRS DURATION: 132 MS
EKG QTC CALCULATION (BAZETT): 512 MS
EKG R AXIS: 9 DEGREES
EKG T AXIS: 46 DEGREES
EKG VENTRICULAR RATE: 96 BPM

## 2020-08-12 PROCEDURE — 93010 ELECTROCARDIOGRAM REPORT: CPT | Performed by: INTERNAL MEDICINE

## 2020-09-23 ENCOUNTER — OFFICE VISIT (OUTPATIENT)
Dept: SURGERY | Age: 70
End: 2020-09-23
Payer: MEDICARE

## 2020-09-23 VITALS
DIASTOLIC BLOOD PRESSURE: 84 MMHG | HEART RATE: 72 BPM | TEMPERATURE: 96.8 F | OXYGEN SATURATION: 97 % | SYSTOLIC BLOOD PRESSURE: 136 MMHG

## 2020-09-23 PROCEDURE — 99203 OFFICE O/P NEW LOW 30 MIN: CPT | Performed by: PLASTIC SURGERY

## 2020-09-23 RX ORDER — GABAPENTIN 300 MG/1
300 CAPSULE ORAL 3 TIMES DAILY
COMMUNITY

## 2020-09-23 NOTE — PROGRESS NOTES
Department of Plastic Surgery - Adult  Attending Consult Note        CHIEF COMPLAINT:  Sacral wound    History Obtained From:  patient    HISTORY OF PRESENT ILLNESS:                The patient is a 69 y.o. female who presents with nonhealing sacral wound. The patient states she first developed this wound in 2020 where she presented to the hospital for altered mental status and subsequent debridement with general surgery. She was then discharged to a skilled nursing facility where she has been since May 2020. At this time current dressing changes include every other day wound VAC. She is nonambulatory at this time. The patient states she is nonambulatory second to the extent of her wound and her inability to begin physical therapy.        Social History:   Social History     Socioeconomic History    Marital status:      Spouse name: Not on file    Number of children: Not on file    Years of education: Not on file    Highest education level: Not on file   Occupational History    Not on file   Social Needs    Financial resource strain: Not on file    Food insecurity     Worry: Not on file     Inability: Not on file    Transportation needs     Medical: Not on file     Non-medical: Not on file   Tobacco Use    Smoking status: Former Smoker     Packs/day: 1.00     Years: 30.00     Pack years: 30.00     Last attempt to quit: 2011     Years since quittin.1    Smokeless tobacco: Never Used   Substance and Sexual Activity    Alcohol use: No    Drug use: No    Sexual activity: Not Currently   Lifestyle    Physical activity     Days per week: Not on file     Minutes per session: Not on file    Stress: Not on file   Relationships    Social connections     Talks on phone: Not on file     Gets together: Not on file     Attends Yazidism service: Not on file     Active member of club or organization: Not on file     Attends meetings of clubs or organizations: Not on file     Relationship insulin glargine (BASAGLAR KWIKPEN) 100 UNIT/ML injection pen Inject 10 Units into the skin nightly      busPIRone (BUSPAR) 5 MG tablet Take 5 mg by mouth daily      docusate sodium (COLACE) 100 MG capsule Take 100 mg by mouth 2 times daily      ferrous sulfate (IRON 325) 325 (65 Fe) MG tablet Take 325 mg by mouth daily (with breakfast)      lisinopril (PRINIVIL;ZESTRIL) 10 MG tablet Take 10 mg by mouth daily      melatonin 1 MG tablet Take 3 mg by mouth nightly as needed for Sleep      oxyCODONE-acetaminophen (PERCOCET) 5-325 MG per tablet Take 1 tablet by mouth every 8 hours as needed for Pain.  polyethylene glycol (GLYCOLAX) 17 g packet Take 17 g by mouth daily      senna (SENOKOT) 8.6 MG tablet Take 1 tablet by mouth 2 times daily      sertraline (ZOLOFT) 50 MG tablet Take 50 mg by mouth daily      vitamin B-1 (THIAMINE) 100 MG tablet Take 200 mg by mouth daily      ondansetron (ZOFRAN) 4 MG tablet Take 4 mg by mouth every 8 hours as needed for Nausea or Vomiting      sodium polystyrene sulfonate (SPS) 30 GM/120ML SUSP Place 30 g rectally once      sucralfate (CARAFATE) 1 GM tablet Take 1 tablet by mouth 4 times daily 120 tablet 1    acetaminophen (TYLENOL) 325 MG tablet Take 650 mg by mouth every 6 hours as needed for Pain or Fever        No current facility-administered medications for this visit. Allergies:  Patient has no known allergies.     Social History:   Social History     Socioeconomic History    Marital status:      Spouse name: Not on file    Number of children: Not on file    Years of education: Not on file    Highest education level: Not on file   Occupational History    Not on file   Social Needs    Financial resource strain: Not on file    Food insecurity     Worry: Not on file     Inability: Not on file    Transportation needs     Medical: Not on file     Non-medical: Not on file   Tobacco Use    Smoking status: Former Smoker     Packs/day: 1.00 Years: 30.00     Pack years: 30.00     Last attempt to quit: 2011     Years since quittin.1    Smokeless tobacco: Never Used   Substance and Sexual Activity    Alcohol use: No    Drug use: No    Sexual activity: Not Currently   Lifestyle    Physical activity     Days per week: Not on file     Minutes per session: Not on file    Stress: Not on file   Relationships    Social connections     Talks on phone: Not on file     Gets together: Not on file     Attends Jainism service: Not on file     Active member of club or organization: Not on file     Attends meetings of clubs or organizations: Not on file     Relationship status: Not on file    Intimate partner violence     Fear of current or ex partner: Not on file     Emotionally abused: Not on file     Physically abused: Not on file     Forced sexual activity: Not on file   Other Topics Concern    Not on file   Social History Narrative    Not on file       Family History:   Family History   Problem Relation Age of Onset    Cancer Sister        REVIEW OF SYSTEMS:    CONSTITUTIONAL:  negative for  fevers, chills, sweats and fatigue  EYES: negative for dipolpia or acute vision loss. RESPIRATORY:  negative for  dry cough, cough with sputum, dyspnea, wheezing and chest pain  HENT:negative for pain, headache, difficulty swallowing or nose bleeds. CARDIOVASCULAR:  negative for  chest pain, dyspnea, palpitations, syncope  GASTROINTESTINAL:  negative for nausea, vomiting, change in bowel habits, diarrhea, constipation and abdominal pain  EXTREMITIES: negative for edema  MUSCULOSKELETAL: negative for muscle weakness  SKIN: positive for lesion, negative for itching or rashes.   HEME: negative for easy brusing or bleeding  BEHAVIOR/PSYCH:  negative for poor appetite, increased appetite, decreased sleep and poor concentration  PSYCH: Alert and oriented to person place and time    [place]    PHYSICAL EXAM:        VITALS:  /84 (Site: Left Lower Arm, Position: Sitting, Cuff Size: Medium Adult)   Pulse 72   Temp 96.8 °F (36 °C)   SpO2 97%   CONSTITUTIONAL:  awake, alert, cooperative, no apparent distress, and appears stated age  EYES: PERRLA, EOMI, no signs of occular infection  LUNGS:  No increased work of breathing, good air exchange  CARDIOVASCULAR:  regular rate and rhythm   EXTREMITIES: no signs of clubbing or cyanosis. MUSCULOSKELETAL: negative for flaccid muscle tone or spastic movements. NEURO: Cranial nerves II-XII grossly intact. No signs of agitated mood. Wound is located at sacrum. There are no neighboring scars. The size is 12x5cm . There is 1cm tracking. There is no undermining. There is no odor. Drainage is present. There is  granulation tissue at the wound base. There is mo bone at the base. DATA:    Radiology Review:  None    Prealbumin None    Hgb A1C   Hemoglobin A1C  6.9High    4.0 - 5.6 %  Final  05/18/2020  4:17 AM  [facility]          Smoking history Non smoker      IMPRESSION/RECOMMENDATIONS:      Nonhealing sacral wound. Prealbumin ordered today. New hemoglobin A1c ordered today. Plastic and reconstructive surgery recommends continued every other day wound VAC changes to sacral wound. Explained to the patient today she may not require surgical intervention with continued wound VAC therapy. Okay from plastic and reconstructive surgery standpoint for her to begin physical therapy with wound VAC in place there is no need for her to continue skilled nursing facility from our standpoint if she can have VAC changes at home every other day as well as PT.     There is no visible wounding on the right upper thigh    Follow-up 3 months

## 2020-10-09 ENCOUNTER — HOSPITAL ENCOUNTER (INPATIENT)
Age: 70
LOS: 3 days | Discharge: ANOTHER HOSPITAL | DRG: 682 | End: 2020-10-12
Attending: EMERGENCY MEDICINE | Admitting: FAMILY MEDICINE
Payer: MEDICARE

## 2020-10-09 ENCOUNTER — APPOINTMENT (OUTPATIENT)
Dept: GENERAL RADIOLOGY | Age: 70
DRG: 682 | End: 2020-10-09
Payer: MEDICARE

## 2020-10-09 PROBLEM — E87.5 HYPERKALEMIA: Status: ACTIVE | Noted: 2020-10-09

## 2020-10-09 LAB
ALBUMIN SERPL-MCNC: 2.7 G/DL (ref 3.5–5.2)
ALP BLD-CCNC: 89 U/L (ref 35–104)
ALT SERPL-CCNC: 23 U/L (ref 0–32)
ANION GAP SERPL CALCULATED.3IONS-SCNC: 7 MMOL/L (ref 7–16)
AST SERPL-CCNC: 26 U/L (ref 0–31)
BASOPHILS ABSOLUTE: 0.03 E9/L (ref 0–0.2)
BASOPHILS RELATIVE PERCENT: 0.6 % (ref 0–2)
BILIRUB SERPL-MCNC: <0.2 MG/DL (ref 0–1.2)
BUN BLDV-MCNC: 83 MG/DL (ref 8–23)
CALCIUM SERPL-MCNC: 9 MG/DL (ref 8.6–10.2)
CHLORIDE BLD-SCNC: 111 MMOL/L (ref 98–107)
CO2: 22 MMOL/L (ref 22–29)
CREAT SERPL-MCNC: 4 MG/DL (ref 0.5–1)
EOSINOPHILS ABSOLUTE: 0.24 E9/L (ref 0.05–0.5)
EOSINOPHILS RELATIVE PERCENT: 4.4 % (ref 0–6)
GFR AFRICAN AMERICAN: 13
GFR NON-AFRICAN AMERICAN: 11 ML/MIN/1.73
GLUCOSE BLD-MCNC: 60 MG/DL (ref 74–99)
HCT VFR BLD CALC: 23.6 % (ref 34–48)
HEMOGLOBIN: 7.2 G/DL (ref 11.5–15.5)
IMMATURE GRANULOCYTES #: 0.02 E9/L
IMMATURE GRANULOCYTES %: 0.4 % (ref 0–5)
LYMPHOCYTES ABSOLUTE: 0.82 E9/L (ref 1.5–4)
LYMPHOCYTES RELATIVE PERCENT: 15.1 % (ref 20–42)
MCH RBC QN AUTO: 32 PG (ref 26–35)
MCHC RBC AUTO-ENTMCNC: 30.5 % (ref 32–34.5)
MCV RBC AUTO: 104.9 FL (ref 80–99.9)
MONOCYTES ABSOLUTE: 0.83 E9/L (ref 0.1–0.95)
MONOCYTES RELATIVE PERCENT: 15.3 % (ref 2–12)
NEUTROPHILS ABSOLUTE: 3.5 E9/L (ref 1.8–7.3)
NEUTROPHILS RELATIVE PERCENT: 64.2 % (ref 43–80)
PDW BLD-RTO: 13.8 FL (ref 11.5–15)
PLATELET # BLD: 222 E9/L (ref 130–450)
PMV BLD AUTO: 9.5 FL (ref 7–12)
POTASSIUM REFLEX MAGNESIUM: 6 MMOL/L (ref 3.5–5)
RBC # BLD: 2.25 E12/L (ref 3.5–5.5)
SARS-COV-2, NAAT: NOT DETECTED
SODIUM BLD-SCNC: 140 MMOL/L (ref 132–146)
TOTAL PROTEIN: 6.3 G/DL (ref 6.4–8.3)
TROPONIN: 0.09 NG/ML (ref 0–0.03)
WBC # BLD: 5.4 E9/L (ref 4.5–11.5)

## 2020-10-09 PROCEDURE — 99284 EMERGENCY DEPT VISIT MOD MDM: CPT

## 2020-10-09 PROCEDURE — 2060000000 HC ICU INTERMEDIATE R&B

## 2020-10-09 PROCEDURE — 80053 COMPREHEN METABOLIC PANEL: CPT

## 2020-10-09 PROCEDURE — 94640 AIRWAY INHALATION TREATMENT: CPT

## 2020-10-09 PROCEDURE — 71045 X-RAY EXAM CHEST 1 VIEW: CPT

## 2020-10-09 PROCEDURE — U0002 COVID-19 LAB TEST NON-CDC: HCPCS

## 2020-10-09 PROCEDURE — 2580000003 HC RX 258: Performed by: STUDENT IN AN ORGANIZED HEALTH CARE EDUCATION/TRAINING PROGRAM

## 2020-10-09 PROCEDURE — 96374 THER/PROPH/DIAG INJ IV PUSH: CPT

## 2020-10-09 PROCEDURE — 96375 TX/PRO/DX INJ NEW DRUG ADDON: CPT

## 2020-10-09 PROCEDURE — 84484 ASSAY OF TROPONIN QUANT: CPT

## 2020-10-09 PROCEDURE — 6360000002 HC RX W HCPCS: Performed by: STUDENT IN AN ORGANIZED HEALTH CARE EDUCATION/TRAINING PROGRAM

## 2020-10-09 PROCEDURE — 93005 ELECTROCARDIOGRAM TRACING: CPT | Performed by: EMERGENCY MEDICINE

## 2020-10-09 PROCEDURE — 85025 COMPLETE CBC W/AUTO DIFF WBC: CPT

## 2020-10-09 RX ORDER — CALCIUM GLUCONATE 94 MG/ML
1 INJECTION, SOLUTION INTRAVENOUS ONCE
Status: COMPLETED | OUTPATIENT
Start: 2020-10-09 | End: 2020-10-09

## 2020-10-09 RX ORDER — HEPARIN SODIUM 5000 [USP'U]/ML
5000 INJECTION, SOLUTION INTRAVENOUS; SUBCUTANEOUS EVERY 8 HOURS SCHEDULED
Status: CANCELLED | OUTPATIENT
Start: 2020-10-09

## 2020-10-09 RX ORDER — DEXTROSE MONOHYDRATE 25 G/50ML
25 INJECTION, SOLUTION INTRAVENOUS ONCE
Status: COMPLETED | OUTPATIENT
Start: 2020-10-09 | End: 2020-10-09

## 2020-10-09 RX ADMIN — CALCIUM GLUCONATE 1 G: 98 INJECTION, SOLUTION INTRAVENOUS at 22:42

## 2020-10-09 RX ADMIN — DEXTROSE MONOHYDRATE 25 G: 25 INJECTION, SOLUTION INTRAVENOUS at 22:42

## 2020-10-09 RX ADMIN — ALBUTEROL SULFATE 10 MG: 2.5 SOLUTION RESPIRATORY (INHALATION) at 22:32

## 2020-10-10 ENCOUNTER — APPOINTMENT (OUTPATIENT)
Dept: GENERAL RADIOLOGY | Age: 70
DRG: 682 | End: 2020-10-10
Payer: MEDICARE

## 2020-10-10 ENCOUNTER — APPOINTMENT (OUTPATIENT)
Dept: ULTRASOUND IMAGING | Age: 70
DRG: 682 | End: 2020-10-10
Payer: MEDICARE

## 2020-10-10 PROBLEM — E44.0 MODERATE PROTEIN-CALORIE MALNUTRITION (HCC): Chronic | Status: ACTIVE | Noted: 2020-10-10

## 2020-10-10 LAB
ABO/RH: NORMAL
ALBUMIN SERPL-MCNC: 2.5 G/DL (ref 3.5–5.2)
ALP BLD-CCNC: 75 U/L (ref 35–104)
ALT SERPL-CCNC: 21 U/L (ref 0–32)
ANION GAP SERPL CALCULATED.3IONS-SCNC: 7 MMOL/L (ref 7–16)
ANION GAP SERPL CALCULATED.3IONS-SCNC: 9 MMOL/L (ref 7–16)
ANTIBODY SCREEN: NORMAL
AST SERPL-CCNC: 23 U/L (ref 0–31)
BACTERIA: ABNORMAL /HPF
BASOPHILS ABSOLUTE: 0.03 E9/L (ref 0–0.2)
BASOPHILS RELATIVE PERCENT: 0.4 % (ref 0–2)
BILIRUB SERPL-MCNC: <0.2 MG/DL (ref 0–1.2)
BILIRUBIN URINE: NEGATIVE
BLOOD BANK DISPENSE STATUS: NORMAL
BLOOD BANK PRODUCT CODE: NORMAL
BLOOD, URINE: ABNORMAL
BPU ID: NORMAL
BUN BLDV-MCNC: 78 MG/DL (ref 8–23)
BUN BLDV-MCNC: 84 MG/DL (ref 8–23)
CALCIUM SERPL-MCNC: 8.8 MG/DL (ref 8.6–10.2)
CALCIUM SERPL-MCNC: 8.9 MG/DL (ref 8.6–10.2)
CHLORIDE BLD-SCNC: 115 MMOL/L (ref 98–107)
CHLORIDE BLD-SCNC: 115 MMOL/L (ref 98–107)
CHLORIDE URINE RANDOM: 59 MMOL/L
CLARITY: ABNORMAL
CO2: 18 MMOL/L (ref 22–29)
CO2: 20 MMOL/L (ref 22–29)
COLOR: YELLOW
CREAT SERPL-MCNC: 3.8 MG/DL (ref 0.5–1)
CREAT SERPL-MCNC: 4 MG/DL (ref 0.5–1)
CREATININE URINE: 46 MG/DL (ref 29–226)
DESCRIPTION BLOOD BANK: NORMAL
EKG ATRIAL RATE: 78 BPM
EKG P AXIS: 85 DEGREES
EKG P-R INTERVAL: 232 MS
EKG Q-T INTERVAL: 434 MS
EKG QRS DURATION: 120 MS
EKG QTC CALCULATION (BAZETT): 494 MS
EKG R AXIS: -24 DEGREES
EKG T AXIS: 80 DEGREES
EKG VENTRICULAR RATE: 78 BPM
EOSINOPHILS ABSOLUTE: 0.05 E9/L (ref 0.05–0.5)
EOSINOPHILS RELATIVE PERCENT: 0.7 % (ref 0–6)
GFR AFRICAN AMERICAN: 13
GFR AFRICAN AMERICAN: 14
GFR NON-AFRICAN AMERICAN: 11 ML/MIN/1.73
GFR NON-AFRICAN AMERICAN: 12 ML/MIN/1.73
GLUCOSE BLD-MCNC: 105 MG/DL (ref 74–99)
GLUCOSE BLD-MCNC: 123 MG/DL (ref 74–99)
GLUCOSE URINE: NEGATIVE MG/DL
HCT VFR BLD CALC: 22 % (ref 34–48)
HEMOGLOBIN: 6.7 G/DL (ref 11.5–15.5)
IMMATURE GRANULOCYTES #: 0.03 E9/L
IMMATURE GRANULOCYTES %: 0.4 % (ref 0–5)
KETONES, URINE: NEGATIVE MG/DL
LEUKOCYTE ESTERASE, URINE: ABNORMAL
LYMPHOCYTES ABSOLUTE: 0.77 E9/L (ref 1.5–4)
LYMPHOCYTES RELATIVE PERCENT: 11.5 % (ref 20–42)
MCH RBC QN AUTO: 32.2 PG (ref 26–35)
MCHC RBC AUTO-ENTMCNC: 30.5 % (ref 32–34.5)
MCV RBC AUTO: 105.8 FL (ref 80–99.9)
METER GLUCOSE: 129 MG/DL (ref 74–99)
METER GLUCOSE: 136 MG/DL (ref 74–99)
METER GLUCOSE: 153 MG/DL (ref 74–99)
METER GLUCOSE: 166 MG/DL (ref 74–99)
METER GLUCOSE: 179 MG/DL (ref 74–99)
METER GLUCOSE: 181 MG/DL (ref 74–99)
METER GLUCOSE: 192 MG/DL (ref 74–99)
METER GLUCOSE: 204 MG/DL (ref 74–99)
METER GLUCOSE: 212 MG/DL (ref 74–99)
METER GLUCOSE: 284 MG/DL (ref 74–99)
METER GLUCOSE: 78 MG/DL (ref 74–99)
MONOCYTES ABSOLUTE: 0.8 E9/L (ref 0.1–0.95)
MONOCYTES RELATIVE PERCENT: 11.9 % (ref 2–12)
NEUTROPHILS ABSOLUTE: 5.04 E9/L (ref 1.8–7.3)
NEUTROPHILS RELATIVE PERCENT: 75.1 % (ref 43–80)
NITRITE, URINE: NEGATIVE
PDW BLD-RTO: 13.8 FL (ref 11.5–15)
PH UA: 7.5 (ref 5–9)
PLATELET # BLD: 201 E9/L (ref 130–450)
PMV BLD AUTO: 9.5 FL (ref 7–12)
POTASSIUM REFLEX MAGNESIUM: 6.1 MMOL/L (ref 3.5–5)
POTASSIUM SERPL-SCNC: 5.9 MMOL/L (ref 3.5–5)
POTASSIUM SERPL-SCNC: 5.9 MMOL/L (ref 3.5–5)
POTASSIUM, UR: 35 MMOL/L
PROTEIN UA: 100 MG/DL
RBC # BLD: 2.08 E12/L (ref 3.5–5.5)
RBC UA: ABNORMAL /HPF (ref 0–2)
SODIUM BLD-SCNC: 140 MMOL/L (ref 132–146)
SODIUM BLD-SCNC: 144 MMOL/L (ref 132–146)
SODIUM URINE: 73 MMOL/L
SPECIFIC GRAVITY UA: 1.02 (ref 1–1.03)
TOTAL PROTEIN: 5.8 G/DL (ref 6.4–8.3)
UROBILINOGEN, URINE: 0.2 E.U./DL
WBC # BLD: 6.7 E9/L (ref 4.5–11.5)
WBC UA: ABNORMAL /HPF (ref 0–5)

## 2020-10-10 PROCEDURE — 82962 GLUCOSE BLOOD TEST: CPT

## 2020-10-10 PROCEDURE — 84300 ASSAY OF URINE SODIUM: CPT

## 2020-10-10 PROCEDURE — 6360000002 HC RX W HCPCS: Performed by: INTERNAL MEDICINE

## 2020-10-10 PROCEDURE — 86901 BLOOD TYPING SEROLOGIC RH(D): CPT

## 2020-10-10 PROCEDURE — 2580000003 HC RX 258: Performed by: FAMILY MEDICINE

## 2020-10-10 PROCEDURE — 2060000000 HC ICU INTERMEDIATE R&B

## 2020-10-10 PROCEDURE — 82570 ASSAY OF URINE CREATININE: CPT

## 2020-10-10 PROCEDURE — 71045 X-RAY EXAM CHEST 1 VIEW: CPT

## 2020-10-10 PROCEDURE — 76770 US EXAM ABDO BACK WALL COMP: CPT

## 2020-10-10 PROCEDURE — 80053 COMPREHEN METABOLIC PANEL: CPT

## 2020-10-10 PROCEDURE — 2500000003 HC RX 250 WO HCPCS: Performed by: INTERNAL MEDICINE

## 2020-10-10 PROCEDURE — 2580000003 HC RX 258: Performed by: INTERNAL MEDICINE

## 2020-10-10 PROCEDURE — 82436 ASSAY OF URINE CHLORIDE: CPT

## 2020-10-10 PROCEDURE — 86900 BLOOD TYPING SEROLOGIC ABO: CPT

## 2020-10-10 PROCEDURE — 81001 URINALYSIS AUTO W/SCOPE: CPT

## 2020-10-10 PROCEDURE — 36430 TRANSFUSION BLD/BLD COMPNT: CPT

## 2020-10-10 PROCEDURE — 84133 ASSAY OF URINE POTASSIUM: CPT

## 2020-10-10 PROCEDURE — 36415 COLL VENOUS BLD VENIPUNCTURE: CPT

## 2020-10-10 PROCEDURE — 86923 COMPATIBILITY TEST ELECTRIC: CPT

## 2020-10-10 PROCEDURE — 86850 RBC ANTIBODY SCREEN: CPT

## 2020-10-10 PROCEDURE — 6370000000 HC RX 637 (ALT 250 FOR IP): Performed by: INTERNAL MEDICINE

## 2020-10-10 PROCEDURE — P9016 RBC LEUKOCYTES REDUCED: HCPCS

## 2020-10-10 PROCEDURE — 6370000000 HC RX 637 (ALT 250 FOR IP): Performed by: FAMILY MEDICINE

## 2020-10-10 PROCEDURE — 85025 COMPLETE CBC W/AUTO DIFF WBC: CPT

## 2020-10-10 PROCEDURE — 84132 ASSAY OF SERUM POTASSIUM: CPT

## 2020-10-10 PROCEDURE — 80048 BASIC METABOLIC PNL TOTAL CA: CPT

## 2020-10-10 RX ORDER — ACETAMINOPHEN 650 MG/1
650 SUPPOSITORY RECTAL EVERY 6 HOURS PRN
Status: DISCONTINUED | OUTPATIENT
Start: 2020-10-10 | End: 2020-10-12 | Stop reason: HOSPADM

## 2020-10-10 RX ORDER — DEXTROSE AND SODIUM CHLORIDE 5; .9 G/100ML; G/100ML
INJECTION, SOLUTION INTRAVENOUS CONTINUOUS
Status: DISCONTINUED | OUTPATIENT
Start: 2020-10-10 | End: 2020-10-10

## 2020-10-10 RX ORDER — DEXTROSE MONOHYDRATE 25 G/50ML
12.5 INJECTION, SOLUTION INTRAVENOUS PRN
Status: DISCONTINUED | OUTPATIENT
Start: 2020-10-10 | End: 2020-10-12 | Stop reason: HOSPADM

## 2020-10-10 RX ORDER — DEXTROSE MONOHYDRATE 25 G/50ML
50 INJECTION, SOLUTION INTRAVENOUS ONCE
Status: COMPLETED | OUTPATIENT
Start: 2020-10-10 | End: 2020-10-10

## 2020-10-10 RX ORDER — ONDANSETRON 4 MG/1
4 TABLET, FILM COATED ORAL EVERY 8 HOURS PRN
Status: DISCONTINUED | OUTPATIENT
Start: 2020-10-10 | End: 2020-10-10

## 2020-10-10 RX ORDER — SODIUM CHLORIDE 9 MG/ML
INJECTION, SOLUTION INTRAVENOUS CONTINUOUS
Status: DISCONTINUED | OUTPATIENT
Start: 2020-10-10 | End: 2020-10-10

## 2020-10-10 RX ORDER — SUCRALFATE 1 G/1
1 TABLET ORAL 4 TIMES DAILY
Status: DISCONTINUED | OUTPATIENT
Start: 2020-10-10 | End: 2020-10-12 | Stop reason: HOSPADM

## 2020-10-10 RX ORDER — ALOGLIPTIN 6.25 MG/1
6.25 TABLET, FILM COATED ORAL DAILY
Status: DISCONTINUED | OUTPATIENT
Start: 2020-10-10 | End: 2020-10-12 | Stop reason: HOSPADM

## 2020-10-10 RX ORDER — ACETAMINOPHEN 325 MG/1
650 TABLET ORAL EVERY 6 HOURS PRN
Status: DISCONTINUED | OUTPATIENT
Start: 2020-10-10 | End: 2020-10-12 | Stop reason: HOSPADM

## 2020-10-10 RX ORDER — THIAMINE MONONITRATE (VIT B1) 100 MG
200 TABLET ORAL DAILY
Status: DISCONTINUED | OUTPATIENT
Start: 2020-10-10 | End: 2020-10-12 | Stop reason: HOSPADM

## 2020-10-10 RX ORDER — FERROUS SULFATE 325(65) MG
325 TABLET ORAL
Status: DISCONTINUED | OUTPATIENT
Start: 2020-10-10 | End: 2020-10-12 | Stop reason: HOSPADM

## 2020-10-10 RX ORDER — ONDANSETRON 2 MG/ML
4 INJECTION INTRAMUSCULAR; INTRAVENOUS EVERY 8 HOURS PRN
Status: DISCONTINUED | OUTPATIENT
Start: 2020-10-10 | End: 2020-10-10 | Stop reason: SDUPTHER

## 2020-10-10 RX ORDER — DOCUSATE SODIUM 100 MG/1
100 CAPSULE, LIQUID FILLED ORAL 2 TIMES DAILY
Status: DISCONTINUED | OUTPATIENT
Start: 2020-10-10 | End: 2020-10-12 | Stop reason: HOSPADM

## 2020-10-10 RX ORDER — OXYCODONE HYDROCHLORIDE AND ACETAMINOPHEN 5; 325 MG/1; MG/1
1 TABLET ORAL EVERY 8 HOURS PRN
Status: DISCONTINUED | OUTPATIENT
Start: 2020-10-10 | End: 2020-10-12 | Stop reason: HOSPADM

## 2020-10-10 RX ORDER — POLYETHYLENE GLYCOL 3350 17 G/17G
17 POWDER, FOR SOLUTION ORAL DAILY
Status: DISCONTINUED | OUTPATIENT
Start: 2020-10-10 | End: 2020-10-12 | Stop reason: HOSPADM

## 2020-10-10 RX ORDER — SODIUM CHLORIDE 0.9 % (FLUSH) 0.9 %
10 SYRINGE (ML) INJECTION PRN
Status: DISCONTINUED | OUTPATIENT
Start: 2020-10-10 | End: 2020-10-12 | Stop reason: HOSPADM

## 2020-10-10 RX ORDER — NICOTINE POLACRILEX 4 MG
15 LOZENGE BUCCAL PRN
Status: DISCONTINUED | OUTPATIENT
Start: 2020-10-10 | End: 2020-10-12 | Stop reason: HOSPADM

## 2020-10-10 RX ORDER — ONDANSETRON 2 MG/ML
4 INJECTION INTRAMUSCULAR; INTRAVENOUS EVERY 6 HOURS PRN
Status: DISCONTINUED | OUTPATIENT
Start: 2020-10-10 | End: 2020-10-10

## 2020-10-10 RX ORDER — SODIUM POLYSTYRENE SULFONATE 15 G/60ML
15 SUSPENSION ORAL; RECTAL ONCE
Status: COMPLETED | OUTPATIENT
Start: 2020-10-10 | End: 2020-10-10

## 2020-10-10 RX ORDER — 0.9 % SODIUM CHLORIDE 0.9 %
20 INTRAVENOUS SOLUTION INTRAVENOUS ONCE
Status: DISCONTINUED | OUTPATIENT
Start: 2020-10-10 | End: 2020-10-12 | Stop reason: HOSPADM

## 2020-10-10 RX ORDER — POLYETHYLENE GLYCOL 3350 17 G/17G
17 POWDER, FOR SOLUTION ORAL DAILY PRN
Status: DISCONTINUED | OUTPATIENT
Start: 2020-10-10 | End: 2020-10-10 | Stop reason: SDUPTHER

## 2020-10-10 RX ORDER — GABAPENTIN 300 MG/1
300 CAPSULE ORAL 3 TIMES DAILY
Status: DISCONTINUED | OUTPATIENT
Start: 2020-10-10 | End: 2020-10-12 | Stop reason: HOSPADM

## 2020-10-10 RX ORDER — SODIUM POLYSTYRENE SULFONATE 15 G/60ML
30 SUSPENSION ORAL; RECTAL ONCE
Status: COMPLETED | OUTPATIENT
Start: 2020-10-10 | End: 2020-10-10

## 2020-10-10 RX ORDER — DEXTROSE MONOHYDRATE 50 MG/ML
100 INJECTION, SOLUTION INTRAVENOUS PRN
Status: DISCONTINUED | OUTPATIENT
Start: 2020-10-10 | End: 2020-10-12 | Stop reason: HOSPADM

## 2020-10-10 RX ORDER — ALOGLIPTIN 25 MG/1
25 TABLET, FILM COATED ORAL DAILY
Status: DISCONTINUED | OUTPATIENT
Start: 2020-10-10 | End: 2020-10-10 | Stop reason: DRUGHIGH

## 2020-10-10 RX ORDER — BUSPIRONE HYDROCHLORIDE 5 MG/1
5 TABLET ORAL DAILY
Status: DISCONTINUED | OUTPATIENT
Start: 2020-10-10 | End: 2020-10-12 | Stop reason: HOSPADM

## 2020-10-10 RX ORDER — ACETAMINOPHEN 325 MG/1
650 TABLET ORAL EVERY 4 HOURS PRN
Status: DISCONTINUED | OUTPATIENT
Start: 2020-10-10 | End: 2020-10-10 | Stop reason: SDUPTHER

## 2020-10-10 RX ORDER — SODIUM CHLORIDE 0.9 % (FLUSH) 0.9 %
10 SYRINGE (ML) INJECTION EVERY 12 HOURS SCHEDULED
Status: DISCONTINUED | OUTPATIENT
Start: 2020-10-10 | End: 2020-10-12 | Stop reason: HOSPADM

## 2020-10-10 RX ORDER — AMLODIPINE BESYLATE 10 MG/1
10 TABLET ORAL DAILY
Status: DISCONTINUED | OUTPATIENT
Start: 2020-10-10 | End: 2020-10-12 | Stop reason: HOSPADM

## 2020-10-10 RX ORDER — PROMETHAZINE HYDROCHLORIDE 25 MG/1
12.5 TABLET ORAL EVERY 6 HOURS PRN
Status: DISCONTINUED | OUTPATIENT
Start: 2020-10-10 | End: 2020-10-10

## 2020-10-10 RX ORDER — SENNA PLUS 8.6 MG/1
1 TABLET ORAL 2 TIMES DAILY
Status: DISCONTINUED | OUTPATIENT
Start: 2020-10-10 | End: 2020-10-12 | Stop reason: HOSPADM

## 2020-10-10 RX ORDER — DEXTROSE AND SODIUM CHLORIDE 5; .45 G/100ML; G/100ML
INJECTION, SOLUTION INTRAVENOUS CONTINUOUS
Status: DISCONTINUED | OUTPATIENT
Start: 2020-10-10 | End: 2020-10-10

## 2020-10-10 RX ORDER — ESCITALOPRAM OXALATE 10 MG/1
10 TABLET ORAL DAILY
COMMUNITY

## 2020-10-10 RX ADMIN — SENNOSIDES 8.6 MG: 8.6 TABLET, FILM COATED ORAL at 08:20

## 2020-10-10 RX ADMIN — FERROUS SULFATE TAB 325 MG (65 MG ELEMENTAL FE) 325 MG: 325 (65 FE) TAB at 08:20

## 2020-10-10 RX ADMIN — BUSPIRONE HYDROCHLORIDE 5 MG: 5 TABLET ORAL at 08:21

## 2020-10-10 RX ADMIN — SODIUM CHLORIDE, PRESERVATIVE FREE 10 ML: 5 INJECTION INTRAVENOUS at 20:57

## 2020-10-10 RX ADMIN — DEXTROSE AND SODIUM CHLORIDE: 5; 900 INJECTION, SOLUTION INTRAVENOUS at 05:36

## 2020-10-10 RX ADMIN — INSULIN LISPRO 1 UNITS: 100 INJECTION, SOLUTION INTRAVENOUS; SUBCUTANEOUS at 03:07

## 2020-10-10 RX ADMIN — SENNOSIDES 8.6 MG: 8.6 TABLET, FILM COATED ORAL at 03:06

## 2020-10-10 RX ADMIN — Medication 200 MG: at 08:20

## 2020-10-10 RX ADMIN — ALOGLIPTIN 6.25 MG: 6.25 TABLET, FILM COATED ORAL at 08:20

## 2020-10-10 RX ADMIN — INSULIN LISPRO 2 UNITS: 100 INJECTION, SOLUTION INTRAVENOUS; SUBCUTANEOUS at 11:23

## 2020-10-10 RX ADMIN — DOCUSATE SODIUM 100 MG: 100 CAPSULE ORAL at 03:06

## 2020-10-10 RX ADMIN — SERTRALINE HYDROCHLORIDE 50 MG: 50 TABLET ORAL at 08:20

## 2020-10-10 RX ADMIN — SODIUM CHLORIDE, PRESERVATIVE FREE 10 ML: 5 INJECTION INTRAVENOUS at 08:21

## 2020-10-10 RX ADMIN — CALCIUM GLUCONATE 1 G: 98 INJECTION, SOLUTION INTRAVENOUS at 08:48

## 2020-10-10 RX ADMIN — DEXTROSE MONOHYDRATE 50 ML: 25 INJECTION, SOLUTION INTRAVENOUS at 15:30

## 2020-10-10 RX ADMIN — INSULIN HUMAN 10 UNITS: 100 INJECTION, SOLUTION PARENTERAL at 15:30

## 2020-10-10 RX ADMIN — DEXTROSE MONOHYDRATE 12.5 G: 25 INJECTION, SOLUTION INTRAVENOUS at 17:47

## 2020-10-10 RX ADMIN — SODIUM POLYSTYRENE SULFONATE 30 G: 15 SUSPENSION ORAL; RECTAL at 08:18

## 2020-10-10 RX ADMIN — SODIUM POLYSTYRENE SULFONATE 15 G: 15 SUSPENSION ORAL; RECTAL at 15:29

## 2020-10-10 RX ADMIN — SODIUM BICARBONATE: 84 INJECTION, SOLUTION INTRAVENOUS at 15:38

## 2020-10-10 RX ADMIN — OXYCODONE HYDROCHLORIDE AND ACETAMINOPHEN 1 TABLET: 5; 325 TABLET ORAL at 15:48

## 2020-10-10 RX ADMIN — INSULIN LISPRO 1 UNITS: 100 INJECTION, SOLUTION INTRAVENOUS; SUBCUTANEOUS at 20:52

## 2020-10-10 RX ADMIN — INSULIN HUMAN 10 UNITS: 100 INJECTION, SOLUTION PARENTERAL at 08:25

## 2020-10-10 RX ADMIN — DEXTROSE MONOHYDRATE 50 G: 25 INJECTION, SOLUTION INTRAVENOUS at 08:25

## 2020-10-10 RX ADMIN — AMLODIPINE BESYLATE 10 MG: 10 TABLET ORAL at 08:21

## 2020-10-10 RX ADMIN — DOCUSATE SODIUM 100 MG: 100 CAPSULE ORAL at 08:20

## 2020-10-10 ASSESSMENT — PAIN DESCRIPTION - LOCATION
LOCATION: COCCYX

## 2020-10-10 ASSESSMENT — PAIN DESCRIPTION - PAIN TYPE
TYPE: ACUTE PAIN

## 2020-10-10 ASSESSMENT — ENCOUNTER SYMPTOMS
SHORTNESS OF BREATH: 0
SORE THROAT: 0
VOMITING: 0
DIARRHEA: 0
COUGH: 0
SINUS PRESSURE: 0
BACK PAIN: 0
ABDOMINAL DISTENTION: 0
NAUSEA: 0
WHEEZING: 0

## 2020-10-10 ASSESSMENT — PAIN SCALES - GENERAL
PAINLEVEL_OUTOF10: 5
PAINLEVEL_OUTOF10: 0
PAINLEVEL_OUTOF10: 7
PAINLEVEL_OUTOF10: 7
PAINLEVEL_OUTOF10: 0
PAINLEVEL_OUTOF10: 7

## 2020-10-10 ASSESSMENT — PAIN - FUNCTIONAL ASSESSMENT
PAIN_FUNCTIONAL_ASSESSMENT: ACTIVITIES ARE NOT PREVENTED
PAIN_FUNCTIONAL_ASSESSMENT: PREVENTS OR INTERFERES SOME ACTIVE ACTIVITIES AND ADLS
PAIN_FUNCTIONAL_ASSESSMENT: PREVENTS OR INTERFERES SOME ACTIVE ACTIVITIES AND ADLS

## 2020-10-10 ASSESSMENT — PAIN DESCRIPTION - FREQUENCY
FREQUENCY: INTERMITTENT

## 2020-10-10 ASSESSMENT — PAIN DESCRIPTION - DESCRIPTORS
DESCRIPTORS: BURNING;SHARP
DESCRIPTORS: DULL
DESCRIPTORS: ACHING;DISCOMFORT

## 2020-10-10 ASSESSMENT — PAIN DESCRIPTION - ORIENTATION: ORIENTATION: MID

## 2020-10-10 NOTE — PROGRESS NOTES
Pharmacy Note    An order for Zofran and/or Phenergan has been discontinued for this patient due to the risk of QT prolongation. If an antiemetic is indicated for your patient, please consider use of Tigan or Compazine. Current QTc = 494  Please contact the Pharmacy with any questions.   DEISI Rowe [facility]  10/10/2020  1:27 AM

## 2020-10-10 NOTE — PLAN OF CARE
Problem: Inadequate oral food/beverage intake (NI-2.1)  Goal: Food and/or Nutrient Delivery  Pt will consume >75% at meals and ONS  Description: Individualized approach for food/nutrient provision.   Outcome: Met This Shift

## 2020-10-10 NOTE — CONSULTS
Nephrology Consult Note  Patient's Name: Verna Birmingham  11:59 AM  10/10/2020    Nephrologist: Dr Ben Ferrera    Reason for Consult:  PERLA on CKD  Requesting Physician:  Ashlyn Dumas MD    Chief Complaint:  Abnormal Labs    History Obtained From:  patient and past medical records    History of Present Ilness:    Verna Birmingham is a 69 y.o. female with a history of kidney disease, hypertension, diabetes, ambulatory dysfunction. She presented  from [facility] for abnormal labs. She was seen by our practice during her last admission. At that time she suffered an PERLA that required hemodialysis. She was transferred to [facility] and per the patient her dialysis was stopped there and she regained kidney function. Labs this morning show Na 144, K 6.1, Cl 115, CO2 20, BUN 84, Cr 4.0, Hgb 6.7. She was treated medically for the hyperkalemia and upon assessment she is receiving a unit of PRBC. She denies any SOB or CP. States she has a good appetite and has been drinking fluids. Denies any dysuria or increased frequency. Past Medical History:   Diagnosis Date    Arthritis     knees    Chronic knee pain     Depression     Diabetes mellitus ([facility] [address]     type 2    Gall stones     Hypertension     Kidney disease     Kidney stones     Obesity     Sarcoidosis     SOBOE 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       Past Surgical History:   Procedure Laterality Date    ABDOMEN SURGERY N/A 5/4/2020    SACRAL WOUND DEBRIDEMENT CALL   WITH TIME AVAIL AM performed by Fartun Kilpatrick MD at [address] [place] [address]  x3   [address] [place] [address]  3/31/16    Laparoscopic-Dr. Ramírez Roger    CYSTOSCOPY  2011     for kidney stones    FOOT SURGERY  2009     right     UPPER GASTROINTESTINAL ENDOSCOPY  2.18.15    Dr. Caro Kate Findings: Mild Gastrits and Duodenitis, 2cm Hiatal Hernia    UPPER GASTROINTESTINAL ENDOSCOPY N/A 5/8/2020    EGD CONTROL HEMORRHAGE performed by Moody Johnston Saundra Kearney MD at [address] N/A 5/22/2020    EGD BEDSIDE performed by Zachary Queen MD at [facility] History   Problem Relation Age of Onset    Cancer Sister         reports that she quit smoking about 9 years ago. She has a 30.00 pack-year smoking history. She has never used smokeless tobacco. She reports that she does not drink alcohol or use drugs. Allergies:  Patient has no known allergies. Current Medications:    sodium chloride flush 0.9 % injection 10 mL, 2 times per day  sodium chloride flush 0.9 % injection 10 mL, PRN  amLODIPine (NORVASC) tablet 10 mg, Daily  busPIRone (BUSPAR) tablet 5 mg, Daily  docusate sodium (COLACE) capsule 100 mg, BID  ferrous sulfate (IRON 325) tablet 325 mg, Daily with breakfast  gabapentin (NEURONTIN) capsule 300 mg, TID  oxyCODONE-acetaminophen (PERCOCET) 5-325 MG per tablet 1 tablet, Q8H PRN  polyethylene glycol (GLYCOLAX) packet 17 g, Daily  senna (SENOKOT) tablet 8.6 mg, BID  sertraline (ZOLOFT) tablet 50 mg, Daily  sucralfate (CARAFATE) tablet 1 g, 4x Daily  vitamin B-1 (THIAMINE) tablet 200 mg, Daily  acetaminophen (TYLENOL) tablet 650 mg, Q6H PRN    Or  acetaminophen (TYLENOL) suppository 650 mg, Q6H PRN  insulin lispro (HUMALOG) injection vial 0-12 Units, TID WC  insulin lispro (HUMALOG) injection vial 0-6 Units, Nightly  glucose (GLUTOSE) 40 % oral gel 15 g, PRN  dextrose 50 % IV solution, PRN  glucagon (rDNA) injection 1 mg, PRN  dextrose 5 % solution, PRN  dextrose 5 % and 0.9 % sodium chloride infusion, Continuous  alogliptin (NESINA) tablet 6.25 mg, Daily  0.9 % sodium chloride bolus, Once        Review of Systems:   Pertinent items are noted in HPI.     Physical exam:   Constitutional:    Vitals: VITALS:  BP (!) 133/56   Pulse 82   Temp 97.8 °F (36.6 °C) (Oral)   Resp 15   Ht 5' (1.524 m)   Wt 205 lb (93 kg)   SpO2 99%   BMI 40.04 kg/m²   24HR INTAKE/OUTPUT:      Intake/Output Summary (Last 24 hours) at 10/10/2020 1159  Last data filed at 10/10/2020 0504  Gross per 24 hour   Intake --   Output 600 ml   Net -600 ml     URINARY CATHETER OUTPUT (Castelan):     Skin: no rash, turgor wnl  Heent:  eomi, mmm  Neck: no bruits or jvd noted  Cardiovascular:  S1, S2 without m/r/g  Respiratory: CTA B without w/r/r  Abdomen:  +bs, soft, nt, nd  Ext: neg lower extremity edema  Psychiatric: mood and affect appropriate  Musculoskeletal:  Rom, muscular strength intact    Data:   Labs:  CBC:   Lab Results   Component Value Date    WBC 6.7 10/10/2020    RBC 2.08 10/10/2020    HGB 6.7 10/10/2020    HCT 22.0 10/10/2020    .8 10/10/2020    MCH 32.2 10/10/2020    MCHC 30.5 10/10/2020    RDW 13.8 10/10/2020     10/10/2020    MPV 9.5 10/10/2020     BMP:    Lab Results   Component Value Date     10/10/2020    K 6.1 10/10/2020     10/10/2020    CO2 20 10/10/2020    BUN 84 10/10/2020    LABALBU 2.5 10/10/2020    LABALBU 4.1 10/12/2011    CREATININE 4.0 10/10/2020    CALCIUM 8.9 10/10/2020    GFRAA 13 10/10/2020    LABGLOM 11 10/10/2020    GLUCOSE 123 10/10/2020    GLUCOSE 149 10/12/2011     Ionized Calcium:    Lab Results   Component Value Date    IONCA 1.59 06/26/2013     Magnesium:    Lab Results   Component Value Date    MG 1.8 05/27/2020     Phosphorus:    Lab Results   Component Value Date    PHOS 3.2 05/27/2020     FOLATE:    Lab Results   Component Value Date    FOLATE 2.6 05/04/2020     IRON:    Lab Results   Component Value Date    IRON 38 05/04/2020     Iron Saturation:  No components found for: PERCENTFE  TIBC:    Lab Results   Component Value Date    TIBC 69 05/04/2020     FERRITIN:    Lab Results   Component Value Date    FERRITIN 965 05/24/2020      Imaging:  XR CHEST PORTABLE [[id number]]  Collected: 10/10/20 0513        Order Status: Completed  Updated: 10/10/20 0518       Narrative:         EXAMINATION:   ONE XRAY VIEW OF THE CHEST     10/10/2020 4:04 am     COMPARISON:   10/09/2020 HISTORY:   ORDERING SYSTEM PROVIDED HISTORY: picc line placement verification   TECHNOLOGIST PROVIDED HISTORY:   Reason for exam:->picc line placement verification     FINDINGS:     Left PICC line tip is in the right axilla.  This is unchanged from prior. Cardiomegaly is unchanged. Pulmonary vasculature is not congested. Mediastinal and hilar contours are unchanged. The lungs are clear. The pleural spaces are clear. There is no pneumothorax.       Impression:           The tip of the left PICC line is in the left axilla.  This is unchanged from   the previous. There is otherwise no acute abnormality seen.       XR CHEST PORTABLE [[id number]]  Collected: 10/09/20 2155       Order Status: Completed  Updated: 10/09/20 2158       Narrative:         EXAMINATION:   ONE XRAY VIEW OF THE CHEST     10/9/2020 8:23 pm     COMPARISON:   May 26, 2020     HISTORY:   ORDERING SYSTEM PROVIDED HISTORY: PICC line placement verification   TECHNOLOGIST PROVIDED HISTORY:   Reason for exam:->PICC line placement verification     FINDINGS:   No airspace opacity or pleural effusion. The heart is normal size. No   pneumothorax. No free air beneath the hemidiaphragms.       Impression:         No pneumonia or pleural effusion.           Assessment and Plan  1. Stage I PERLA   With suspected decreased effective renal perfusion in the setting of the anemia  Non-oliguric at this time  Cr 4.0  PLAN: 1. Check urine Cr and lytes  2. Check JOUSÉ  3. Follow strict I&O and daily weight    2. CKD G5  Baseline 3.0-3.3, eGFR 14-15 ml/min in 08/2020  S/P PERLA in May 2020 requiring IHD-->stopped during [facility] admission in June 2020 4/11/20 JOSUÉ showed no hydronephrosis    2. Sacral wound infection  PLAN: 1. Defer to PCP     3. Anemia of CKD   History of GIB  Hgb 6.7, receiving 1 unit pRBCs  PLAN: 1.  Start ARUN     4. Secondary hyperparathyrodism of renal origin  Follow closely on vit d analogue due to sarcoid and h/o stones  On

## 2020-10-10 NOTE — PROGRESS NOTES
Francesco Dykes,    Your patient is on a medication that requires a renal dose adjustment. Renal Function Assessment:    Date Body Weight IBW Adj. Body Weight SCr CrCl Dialysis status   10/10/2020 93 kg 45.5 kg 64.5 kg 4 13 ml/min N/A       Pharmacy has renally dose-adjusted the following medication(s):    Date Medication Original Dosing Regimen New Dosing Regimen   10/10/2020 alogliptin (NESINA) 25 mg PO daily 6.25 mg PO daily           These changes were made per protocol according to the [facility] Renal Function-Based Dose Adjustments Policy    *Please note this dose may need readjusted if your patient's renal function significantly improves. Please contact pharmacy with any questions regarding these changes.     Sherri Quintanilla, [facility]  10/10/2020  6:16 AM

## 2020-10-10 NOTE — ED PROVIDER NOTES
This is a 69-year-old female with a history of kidney disease, hypertension, diabetes, ambulatory dysfunction. She is presenting from [facility] for abnormal labs. Her potassium was reported to be around 6, hemoglobin was reported to be 7.3. The patient feels well, she has no lightheadedness, syncope, chest pain, shortness of breath. She states that she is at [facility] due to inability to walk for the past 6 months due to lower extremity weakness, sacral decubitus ulcer. She states that her sacral ulcer is improving. She states she previously saw Dr. Rojas Friend for nephrology, has seen Dr. Ezequiel Jennings inpatient. Review of Systems   Constitutional: Negative for chills and fever. HENT: Negative for ear pain, sinus pressure and sore throat. Eyes: Negative for visual disturbance. Respiratory: Negative for cough, shortness of breath and wheezing. Cardiovascular: Negative for chest pain. Gastrointestinal: Negative for abdominal distention, diarrhea, nausea and vomiting. Genitourinary: Negative for dysuria and frequency. Musculoskeletal: Negative for arthralgias and back pain. Skin: Negative for rash. Neurological: Negative for weakness and headaches. Hematological: Negative for adenopathy. All other systems reviewed and are negative. Physical Exam  Vitals signs and nursing note reviewed. Constitutional:       Appearance: She is obese. She is not ill-appearing or diaphoretic. Comments: Elderly woman lying in bed comfortably   HENT:      Head: Normocephalic and atraumatic. Right Ear: External ear normal.      Left Ear: External ear normal.      Nose: Nose normal.      Mouth/Throat:      Mouth: Mucous membranes are dry. Pharynx: Oropharynx is clear. Eyes:      Extraocular Movements: Extraocular movements intact. Conjunctiva/sclera: Conjunctivae normal.      Pupils: Pupils are equal, round, and reactive to light.    Neck:      Musculoskeletal: Normal range of motion and neck supple. Cardiovascular:      Rate and Rhythm: Regular rhythm. Tachycardia present. Pulses: Normal pulses. Pulmonary:      Effort: Pulmonary effort is normal. No respiratory distress. Breath sounds: Normal breath sounds. No wheezing or rales. Chest:      Chest wall: No tenderness. Abdominal:      Comments: Round, soft   Musculoskeletal:      Right lower leg: No edema. Left lower leg: No edema. Skin:     General: Skin is warm and dry. Neurological:      General: No focal deficit present. Mental Status: She is alert. Sensory: No sensory deficit. Psychiatric:         Mood and Affect: Mood normal.         Behavior: Behavior normal.         Thought Content: Thought content normal.          Procedures     MDM  Number of Diagnoses or Management Options  PERLA (acute kidney injury) ([facility] [address]): Hyperkalemia:   Diagnosis management comments: This is a 79-year-old female with history of hypertension, diabetes, chronic kidney disease. She is at [facility] for rehabilitation secondary to deconditioning, lower extremity weakness. She is presenting to the emergency department today after receiving scheduled lab work which showed her potassium to be high and hemoglobin to be low. She was found to be anemic, not requiring transfusion, mild to moderate hyperkalemia, with mild hypoglycemia. She was given albuterol, calcium gluconate. She was also found to have significantly worsened kidney function, she will be admitted to the hospital for further treatment, return, work-up of her acute kidney injury, hyperkalemia and anemia. She is comfortable with plan for admission, all questions were answered.        ED Course as of Oct 10 0117   Fri Oct 09, 2020   2250 Case was discussed with Dr. Inna Huddleston by Dr. Anastasiia Reilly, he agreed to admit the patient to the hospital.     [RH]      ED Course User Index  [RH] [address], DO      ED Course as of Oct 10 0117   Fri Oct 2020 Case was discussed with Dr. Heriberto Edward by Dr. Medardo Caballero, he agreed to admit the patient to the hospital.     [RH]      ED Course User Index  [RH] [address] DO       --------------------------------------------- PAST HISTORY ---------------------------------------------  Past Medical History:  has a past medical history of Arthritis, Chronic knee pain, Depression, Diabetes mellitus ([address] Gall stones, Hypertension, Kidney disease, Kidney stones, Obesity, Sarcoidosis, SOBOE (shortness of breath on exertion), Tremor, and Urinary anomaly. Past Surgical History:  has a past surgical history that includes Foot surgery (2009 ); Cystocopy (2011 );  section (x3); Upper gastrointestinal endoscopy (2.18.15); Cholecystectomy (3/31/16); Abdomen surgery (N/A, 2020); Upper gastrointestinal endoscopy (N/A, 2020); and Upper gastrointestinal endoscopy (N/A, 2020). Social History:  reports that she quit smoking about 9 years ago. She has a 30.00 pack-year smoking history. She has never used smokeless tobacco. She reports that she does not drink alcohol or use drugs. Family History: family history includes Cancer in her sister. The patients home medications have been reviewed. Allergies: Patient has no known allergies.     -------------------------------------------------- RESULTS -------------------------------------------------    LABS:  Results for orders placed or performed during the hospital encounter of 10/09/20   Comprehensive Metabolic Panel w/ Reflex to MG   Result Value Ref Range    Sodium 140 132 - 146 mmol/L    Potassium reflex Magnesium 6.0 (H) 3.5 - 5.0 mmol/L    Chloride 111 (H) 98 - 107 mmol/L    CO2 22 22 - 29 mmol/L    Anion Gap 7 7 - 16 mmol/L    Glucose 60 (L) 74 - 99 mg/dL    BUN 83 (H) 8 - 23 mg/dL    CREATININE 4.0 (H) 0.5 - 1.0 mg/dL    GFR Non-African American 11 >=60 mL/min/1.73    GFR African American 13     Calcium 9.0 8.6 - 10.2 mg/dL Total Protein 6.3 (L) 6.4 - 8.3 g/dL    Alb 2.7 (L) 3.5 - 5.2 g/dL    Total Bilirubin <0.2 0.0 - 1.2 mg/dL    Alkaline Phosphatase 89 35 - 104 U/L    ALT 23 0 - 32 U/L    AST 26 0 - 31 U/L   CBC auto differential   Result Value Ref Range    WBC 5.4 4.5 - 11.5 E9/L    RBC 2.25 (L) 3.50 - 5.50 E12/L    Hemoglobin 7.2 (L) 11.5 - 15.5 g/dL    Hematocrit 23.6 (L) 34.0 - 48.0 %    .9 (H) 80.0 - 99.9 fL    MCH 32.0 26.0 - 35.0 pg    MCHC 30.5 (L) 32.0 - 34.5 %    RDW 13.8 11.5 - 15.0 fL    Platelets 652 477 - 874 E9/L    MPV 9.5 7.0 - 12.0 fL    Neutrophils % 64.2 43.0 - 80.0 %    Immature Granulocytes % 0.4 0.0 - 5.0 %    Lymphocytes % 15.1 (L) 20.0 - 42.0 %    Monocytes % 15.3 (H) 2.0 - 12.0 %    Eosinophils % 4.4 0.0 - 6.0 %    Basophils % 0.6 0.0 - 2.0 %    Neutrophils Absolute 3.50 1.80 - 7.30 E9/L    Immature Granulocytes # 0.02 E9/L    Lymphocytes Absolute 0.82 (L) 1.50 - 4.00 E9/L    Monocytes Absolute 0.83 0.10 - 0.95 E9/L    Eosinophils Absolute 0.24 0.05 - 0.50 E9/L    Basophils Absolute 0.03 0.00 - 0.20 E9/L   Troponin   Result Value Ref Range    Troponin 0.09 (H) 0.00 - 0.03 ng/mL   COVID-19   Result Value Ref Range    SARS-CoV-2, NAAT Not Detected Not Detected   POCT Glucose   Result Value Ref Range    Meter Glucose 204 (H) 74 - 99 mg/dL   EKG 12 Lead   Result Value Ref Range    Ventricular Rate 78 BPM    Atrial Rate 78 BPM    P-R Interval 232 ms    QRS Duration 120 ms    Q-T Interval 434 ms    QTc Calculation (Bazett) 494 ms    P Axis 85 degrees    R Axis -24 degrees    T Axis 80 degrees       RADIOLOGY:  XR CHEST PORTABLE   Final Result   No pneumonia or pleural effusion. EKG:  This EKG is signed and interpreted by me.     Rate: 78  Rhythm: Sinus  Interpretation: no acute changes  Comparison: stable as compared to patient's most recent EKG      ------------------------- NURSING NOTES AND VITALS REVIEWED ---------------------------  Date / Time Roomed:  10/9/2020  8:36 PM  ED Bed Assignment:  ROSALVA/ROSALVA    The nursing notes within the ED encounter and vital signs as below have been reviewed. Patient Vitals for the past 24 hrs:   BP Temp Temp src Pulse Resp SpO2 Height Weight   10/09/20 2345 118/71 98 °F (36.7 °C) Oral 108 16 100 % -- --   10/09/20 2232 -- -- -- -- -- 99 % -- --   10/09/20 2038 134/65 97.5 °F (36.4 °C) Oral 78 16 -- 5' (1.524 m) 191 lb (86.6 kg)       Oxygen Saturation Interpretation: Normal    ------------------------------------------ PROGRESS NOTES ------------------------------------------  Re-evaluation(s):  See ED Course    Counseling:  I have spoken with the patient and discussed todays results, in addition to providing specific details for the plan of care and counseling regarding the diagnosis and prognosis. Their questions are answered at this time and they are agreeable with the plan of admission.    --------------------------------- ADDITIONAL PROVIDER NOTES ---------------------------------  Consultations:  See ED Course  This patient's ED course included: a personal history and physicial examination, re-evaluation prior to disposition, multiple bedside re-evaluations, IV medications, cardiac monitoring and continuous pulse oximetry    This patient has remained hemodynamically stable during their ED course. Diagnosis:  1. Hyperkalemia    2. PERLA (acute kidney injury) ([address]    3. Anemia, unspecified type        Disposition:  Patient's disposition: Admit to telemetry  Patient's condition is stable.            [address] DO  Resident  10/10/20 6643

## 2020-10-10 NOTE — CARE COORDINATION
Social Work / Discharge Planning : Patient was admitted from [address] [place] [address]. Call placed to Rebecca Jeffries at [facility] to verify bed hold status and if pre-cert needed to return. Will need Negative COVID prior to discharge. AWait response to [address] and Transport forms completed. Attempted to verify plan to return with patient but currently receiving care. Need to follow up. SW to follow.  Electronically signed by SHANTA Gilbert on 10/10/20 at 12:28 PM EDT
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
## 2020-10-10 NOTE — CONSULTS
10/10/2020  10:02 AM      Comprehensive Nutrition Assessment    Type and Reason for Visit:  Initial, Positive Nutrition Screen, Consult(Herbert score consult)    Nutrition Recommendations/Plan: Continue current diet, as christie (may recommend Low K+, if hyperkalemia persistent) and continue current ONS    Nutrition Assessment:  Pt from NH 2/2 hyperkalemia, anemia. PMH-CKD, DM, sarcoidosis and sacral decubitus. Pt currently has sacral and lower abd wounds doc. Will initiate Wound Healing ONS and HP Ensure ONS to promote continued wound healing    Malnutrition Assessment:  Malnutrition Status: Moderate malnutrition    Context:  Chronic Illness     Findings of the 6 clinical characteristics of malnutrition:  Energy Intake:  7 - 75% or less estimated energy requirements for 1 month or longer  Weight Loss:  7 - 10% over 6 months     Body Fat Loss:  Unable to assess     Muscle Mass Loss:  Unable to assess    Fluid Accumulation:  No significant fluid accumulation     Strength:  Not Performed    Estimated Daily Nutrient Needs:  Energy (kcal):  ; Weight Used for Energy Requirements:  Admission     Protein (g):  55-65 (1.2-1.4 g/kg) as christie; Weight Used for Protein Requirements:  Ideal        Fluid (ml/day):   (1 ml/toan);  Weight Used for Fluid Requirements:  Admission      Nutrition Related Findings:  No appetite changes reported, missing teeth (partial plate not here), +BS, no edema, I/O WNL, hyperkalemia/GFR 11      Wounds:  Wound Care Consult Pending, Multiple, Open Wounds(sacral/coccyx wound draining)       Current Nutrition Therapies:    DIET GENERAL;  Dietary Nutrition Supplements: Wound Healing Oral Supplement  Dietary Nutrition Supplements: Low Calorie High Protein Supplement    Anthropometric Measures:  · Height: 5' (152.4 cm)  · Current Body Weight: 205 lb (93 kg)(10/10)   · Admission Body Weight: 191 lb (86.6 kg)(10/9)    · Usual Body Weight: 238 lb (108 kg)(per EMR x 6 mo ago (prior to NH)) · Ideal Body Weight: 100 lbs; % Ideal Body Weight 205 %   · BMI: 40  · BMI Categories: Obese Class 2 (BMI 35.0 -39.9)(37.3 on admit)       Nutrition Diagnosis:   · Moderate malnutrition, In context of chronic illness related to increase demand for energy/nutrients(2/2 multiple chronic wounds) as evidenced by weight loss greater than or equal to 10% in 6 months, poor intake prior to admission, wounds(20% loss in last 6 mo)      Nutrition Interventions:   Food and/or Nutrient Delivery:  Continue Current Diet, Start Oral Nutrition Supplement(Wound healing ONS and HP Ensure BID)  Nutrition Education/Counseling:  Education not indicated   Coordination of Nutrition Care:  Continued Inpatient Monitoring    Goals:  PO >75% at meals and ONS       Nutrition Monitoring and Evaluation:   Food/Nutrient Intake Outcomes:  Food and Nutrient Intake, Supplement Intake  Physical Signs/Symptoms Outcomes:  Biochemical Data, Chewing or Swallowing, Fluid Status or Edema, GI Status, Weight, Skin, Nutrition Focused Physical Findings     Discharge Planning:     Too soon to determine     Electronically signed by Wagner Bolanos RD, CNSC, LD on 10/10/20 at 10:02 AM EDT    Contact: [phone]

## 2020-10-10 NOTE — PROGRESS NOTES
Dr. Luis Daniel Reese notified of blood sugar of 78. Hypoglycemia protocol of 1/2 amp D50 given.  Dereck Rom [employer]

## 2020-10-11 LAB
ALBUMIN SERPL-MCNC: 2.1 G/DL (ref 3.5–5.2)
ALP BLD-CCNC: 70 U/L (ref 35–104)
ALT SERPL-CCNC: 23 U/L (ref 0–32)
ANION GAP SERPL CALCULATED.3IONS-SCNC: 6 MMOL/L (ref 7–16)
AST SERPL-CCNC: 24 U/L (ref 0–31)
BILIRUB SERPL-MCNC: <0.2 MG/DL (ref 0–1.2)
BUN BLDV-MCNC: 73 MG/DL (ref 8–23)
CALCIUM SERPL-MCNC: 8.2 MG/DL (ref 8.6–10.2)
CHLORIDE BLD-SCNC: 109 MMOL/L (ref 98–107)
CO2: 27 MMOL/L (ref 22–29)
CREAT SERPL-MCNC: 3.8 MG/DL (ref 0.5–1)
GFR AFRICAN AMERICAN: 14
GFR NON-AFRICAN AMERICAN: 12 ML/MIN/1.73
GLUCOSE BLD-MCNC: 155 MG/DL (ref 74–99)
HCT VFR BLD CALC: 23.1 % (ref 34–48)
HEMOGLOBIN: 7.4 G/DL (ref 11.5–15.5)
MAGNESIUM: 2.1 MG/DL (ref 1.6–2.6)
MCH RBC QN AUTO: 32.3 PG (ref 26–35)
MCHC RBC AUTO-ENTMCNC: 32 % (ref 32–34.5)
MCV RBC AUTO: 100.9 FL (ref 80–99.9)
METER GLUCOSE: 129 MG/DL (ref 74–99)
METER GLUCOSE: 149 MG/DL (ref 74–99)
METER GLUCOSE: 153 MG/DL (ref 74–99)
METER GLUCOSE: 228 MG/DL (ref 74–99)
PDW BLD-RTO: 15.1 FL (ref 11.5–15)
PHOSPHORUS: 5.3 MG/DL (ref 2.5–4.5)
PLATELET # BLD: 196 E9/L (ref 130–450)
PMV BLD AUTO: 9.7 FL (ref 7–12)
POTASSIUM SERPL-SCNC: 4.6 MMOL/L (ref 3.5–5)
POTASSIUM SERPL-SCNC: 5.1 MMOL/L (ref 3.5–5)
RBC # BLD: 2.29 E12/L (ref 3.5–5.5)
SODIUM BLD-SCNC: 142 MMOL/L (ref 132–146)
TOTAL PROTEIN: 5.4 G/DL (ref 6.4–8.3)
VITAMIN D 25-HYDROXY: 10 NG/ML (ref 30–100)
WBC # BLD: 4.5 E9/L (ref 4.5–11.5)

## 2020-10-11 PROCEDURE — 36415 COLL VENOUS BLD VENIPUNCTURE: CPT

## 2020-10-11 PROCEDURE — 84100 ASSAY OF PHOSPHORUS: CPT

## 2020-10-11 PROCEDURE — 2500000003 HC RX 250 WO HCPCS: Performed by: INTERNAL MEDICINE

## 2020-10-11 PROCEDURE — 2580000003 HC RX 258: Performed by: NURSE PRACTITIONER

## 2020-10-11 PROCEDURE — 80053 COMPREHEN METABOLIC PANEL: CPT

## 2020-10-11 PROCEDURE — 2580000003 HC RX 258: Performed by: INTERNAL MEDICINE

## 2020-10-11 PROCEDURE — 6370000000 HC RX 637 (ALT 250 FOR IP): Performed by: FAMILY MEDICINE

## 2020-10-11 PROCEDURE — 82962 GLUCOSE BLOOD TEST: CPT

## 2020-10-11 PROCEDURE — 6370000000 HC RX 637 (ALT 250 FOR IP): Performed by: NURSE PRACTITIONER

## 2020-10-11 PROCEDURE — 6370000000 HC RX 637 (ALT 250 FOR IP): Performed by: INTERNAL MEDICINE

## 2020-10-11 PROCEDURE — 82306 VITAMIN D 25 HYDROXY: CPT

## 2020-10-11 PROCEDURE — 83735 ASSAY OF MAGNESIUM: CPT

## 2020-10-11 PROCEDURE — 85027 COMPLETE CBC AUTOMATED: CPT

## 2020-10-11 PROCEDURE — 84132 ASSAY OF SERUM POTASSIUM: CPT

## 2020-10-11 PROCEDURE — 2060000000 HC ICU INTERMEDIATE R&B

## 2020-10-11 RX ORDER — METOPROLOL SUCCINATE 25 MG/1
25 TABLET, EXTENDED RELEASE ORAL DAILY
Status: DISCONTINUED | OUTPATIENT
Start: 2020-10-11 | End: 2020-10-12 | Stop reason: HOSPADM

## 2020-10-11 RX ORDER — SODIUM CHLORIDE, SODIUM LACTATE, POTASSIUM CHLORIDE, CALCIUM CHLORIDE 600; 310; 30; 20 MG/100ML; MG/100ML; MG/100ML; MG/100ML
INJECTION, SOLUTION INTRAVENOUS CONTINUOUS
Status: DISCONTINUED | OUTPATIENT
Start: 2020-10-11 | End: 2020-10-12 | Stop reason: HOSPADM

## 2020-10-11 RX ADMIN — SODIUM ZIRCONIUM CYCLOSILICATE 5 G: 5 POWDER, FOR SUSPENSION ORAL at 12:04

## 2020-10-11 RX ADMIN — AMLODIPINE BESYLATE 10 MG: 10 TABLET ORAL at 08:14

## 2020-10-11 RX ADMIN — OXYCODONE HYDROCHLORIDE AND ACETAMINOPHEN 1 TABLET: 5; 325 TABLET ORAL at 02:54

## 2020-10-11 RX ADMIN — SODIUM BICARBONATE: 84 INJECTION, SOLUTION INTRAVENOUS at 08:11

## 2020-10-11 RX ADMIN — SODIUM BICARBONATE: 84 INJECTION, SOLUTION INTRAVENOUS at 01:24

## 2020-10-11 RX ADMIN — SODIUM CHLORIDE, POTASSIUM CHLORIDE, SODIUM LACTATE AND CALCIUM CHLORIDE 100 ML/HR: 600; 310; 30; 20 INJECTION, SOLUTION INTRAVENOUS at 12:05

## 2020-10-11 RX ADMIN — SENNOSIDES 8.6 MG: 8.6 TABLET, FILM COATED ORAL at 08:14

## 2020-10-11 RX ADMIN — FERROUS SULFATE TAB 325 MG (65 MG ELEMENTAL FE) 325 MG: 325 (65 FE) TAB at 08:14

## 2020-10-11 RX ADMIN — BUSPIRONE HYDROCHLORIDE 5 MG: 5 TABLET ORAL at 08:14

## 2020-10-11 RX ADMIN — INSULIN LISPRO 2 UNITS: 100 INJECTION, SOLUTION INTRAVENOUS; SUBCUTANEOUS at 06:47

## 2020-10-11 RX ADMIN — SERTRALINE HYDROCHLORIDE 50 MG: 50 TABLET ORAL at 08:14

## 2020-10-11 RX ADMIN — METOPROLOL SUCCINATE 25 MG: 25 TABLET, EXTENDED RELEASE ORAL at 12:05

## 2020-10-11 RX ADMIN — OXYCODONE HYDROCHLORIDE AND ACETAMINOPHEN 1 TABLET: 5; 325 TABLET ORAL at 15:29

## 2020-10-11 RX ADMIN — ALOGLIPTIN 6.25 MG: 6.25 TABLET, FILM COATED ORAL at 08:14

## 2020-10-11 RX ADMIN — DOCUSATE SODIUM 100 MG: 100 CAPSULE ORAL at 20:34

## 2020-10-11 RX ADMIN — OXYCODONE HYDROCHLORIDE AND ACETAMINOPHEN 1 TABLET: 5; 325 TABLET ORAL at 23:36

## 2020-10-11 RX ADMIN — SENNOSIDES 8.6 MG: 8.6 TABLET, FILM COATED ORAL at 20:34

## 2020-10-11 RX ADMIN — INSULIN LISPRO 4 UNITS: 100 INJECTION, SOLUTION INTRAVENOUS; SUBCUTANEOUS at 16:18

## 2020-10-11 RX ADMIN — Medication 200 MG: at 08:14

## 2020-10-11 ASSESSMENT — PAIN DESCRIPTION - PAIN TYPE
TYPE: ACUTE PAIN
TYPE: ACUTE PAIN

## 2020-10-11 ASSESSMENT — PAIN DESCRIPTION - DESCRIPTORS
DESCRIPTORS: ACHING;DISCOMFORT
DESCRIPTORS: NAGGING;DISCOMFORT

## 2020-10-11 ASSESSMENT — PAIN SCALES - GENERAL
PAINLEVEL_OUTOF10: 0
PAINLEVEL_OUTOF10: 8
PAINLEVEL_OUTOF10: 0
PAINLEVEL_OUTOF10: 7
PAINLEVEL_OUTOF10: 7
PAINLEVEL_OUTOF10: 5
PAINLEVEL_OUTOF10: 0

## 2020-10-11 ASSESSMENT — PAIN DESCRIPTION - ONSET: ONSET: ON-GOING

## 2020-10-11 ASSESSMENT — PAIN DESCRIPTION - FREQUENCY: FREQUENCY: INTERMITTENT

## 2020-10-11 ASSESSMENT — PAIN DESCRIPTION - LOCATION
LOCATION: COCCYX
LOCATION: COCCYX

## 2020-10-11 ASSESSMENT — PAIN - FUNCTIONAL ASSESSMENT: PAIN_FUNCTIONAL_ASSESSMENT: ACTIVITIES ARE NOT PREVENTED

## 2020-10-11 NOTE — H&P
[id number] [facility]                  [address]                              HISTORY AND PHYSICAL    PATIENT NAME: Solange Fernandes                      :        1950  MED REC NO:   [id number]                            ROOM:  ACCOUNT NO:   [de-identified]                           ADMIT DATE: 10/09/2020  PROVIDER:     Danial Menchaca MD    DATE OF ADMISSION:  10/09/2020    CHIEF COMPLAINT:  Hyperkalemia, worsening renal function, and anemia. HISTORY OF PRESENT ILLNESS:  This 69-year-old with chronic kidney  disease, chronic anemia, diabetes, and large sacral decubitus ulcer was  sent in to the emergency room from the nursing home due to abnormal  labs, which include a potassium of 6 and a hemoglobin of 7.2 and  increased creatinine of 4.0. The patient's baseline creatinine is  around 3. The patient has no complaints whatsoever. Denies any chest  pain or shortness of breath. Hemoglobin did drop to 6.7 this morning. We did order one unit of blood and also Nephrology consultation. RECENT AND PRESENT MEDICATION:  See med rec in the chart. PAST MEDICAL HISTORY:  As described above. Also including severe  osteoarthritis of the knees, sleep apnea, hypertension, sacral  decubitus, and chronic anemia. Also had COVID, was quite asymptomatic  several months ago. SOCIAL HISTORY:  Quit smoking in . Does not drink. She resides, I  believe, in [facility]. FAMILY MEDICAL HISTORY:  Noncontributory. REVIEW OF SYSTEMS:  ALLERGIES:  None. SKIN AND LYMPHATICS:  Decubiti on the sacral area, which is chronic. NEUROLOGICAL:  Denies headaches, blurred vision, and unilateral weakness  or numbness. CIRCULATORY:  Denies chest pain, orthopnea, or PND. GENITOURINARY:  Denies dysuria, frequency, or hematuria. MUSCULOSKELETAL:  Significant osteoarthritis of both knees.     PHYSICAL EXAMINATION:  GENERAL:  The patient lying in bed in absolutely no distress. VITAL SIGNS:  Temperature 97.8, pulse 82, respirations 15 and unlabored,  blood pressure 133/56, O2 sat is 99% on room air. SKIN:  Shows no jaundice, some pallor. HEENT:  Eyes reveal no icterus. NECK:  Supple without bruits or masses. CHEST:  Clear to auscultation. HEART:  Regular rate and rhythm. ABDOMEN:  Soft and nontender at this time. EXTREMITIES:  Reveal no cyanosis, clubbing, or edema. Does have a  sacral decubiti as described above. NEUROLOGICAL:  She is alert and oriented x3. There is no focal  neurological deficits. LABORATORY STUDIES:  Laboratories this morning:  Potassium still  elevated at 6.1. BUN 84. Creatinine 4.0. Troponin is 0.09. Hemoglobin is 6.7. DIAGNOSTIC IMPRESSION:  Acute-on-chronic renal failure with  hyperkalemia, also anemia, most likely due to renal function and chronic  disease. PLAN:  Gentle hydration. Nephrology consultation. Blood transfusion. Return to skilled nursing facility once stable.         Rory Lucas MD    D: 10/10/2020 11:40:10       T: 10/10/2020 11:51:06     /[email or web address]  Job#: [id number]     Doc#: [id number]    CC:

## 2020-10-11 NOTE — PROGRESS NOTES
EGD BEDSIDE performed by Cayr Gardner MD at [facility] ENDOSCOPY       Family History   Problem Relation Age of Onset    Cancer Sister         reports that she quit smoking about 9 years ago. She has a 30.00 pack-year smoking history. She has never used smokeless tobacco. She reports that she does not drink alcohol or use drugs. Allergies:  Patient has no known allergies. Current Medications:    sodium zirconium cyclosilicate (LOKELMA) oral suspension 5 g, Daily  sodium chloride flush 0.9 % injection 10 mL, 2 times per day  sodium chloride flush 0.9 % injection 10 mL, PRN  amLODIPine (NORVASC) tablet 10 mg, Daily  busPIRone (BUSPAR) tablet 5 mg, Daily  docusate sodium (COLACE) capsule 100 mg, BID  ferrous sulfate (IRON 325) tablet 325 mg, Daily with breakfast  gabapentin (NEURONTIN) capsule 300 mg, TID  oxyCODONE-acetaminophen (PERCOCET) 5-325 MG per tablet 1 tablet, Q8H PRN  polyethylene glycol (GLYCOLAX) packet 17 g, Daily  senna (SENOKOT) tablet 8.6 mg, BID  sertraline (ZOLOFT) tablet 50 mg, Daily  sucralfate (CARAFATE) tablet 1 g, 4x Daily  vitamin B-1 (THIAMINE) tablet 200 mg, Daily  acetaminophen (TYLENOL) tablet 650 mg, Q6H PRN    Or  acetaminophen (TYLENOL) suppository 650 mg, Q6H PRN  insulin lispro (HUMALOG) injection vial 0-12 Units, TID WC  insulin lispro (HUMALOG) injection vial 0-6 Units, Nightly  glucose (GLUTOSE) 40 % oral gel 15 g, PRN  dextrose 50 % IV solution, PRN  glucagon (rDNA) injection 1 mg, PRN  dextrose 5 % solution, PRN  alogliptin (NESINA) tablet 6.25 mg, Daily  0.9 % sodium chloride bolus, Once  [START ON 10/12/2020] epoetin derek-epbx (RETACRIT) injection 8,000 Units, Once per day on Mon Wed Fri  sodium bicarbonate 150 mEq in dextrose 5 % 1,000 mL infusion, Continuous        Review of Systems:   Pertinent items are noted in HPI.     Physical exam:   Constitutional:    Vitals: VITALS:  BP (!) 146/62   Pulse 86   Temp 97.7 °F (36.5 °C) (Oral)   Resp 16   Ht 5' (1.524 m)   [employer] 219 lb 4.8 oz (99.5 kg)   SpO2 98%   BMI 42.83 kg/m²   24HR INTAKE/OUTPUT:      Intake/Output Summary (Last 24 hours) at 10/11/2020 1137  Last data filed at 10/11/2020 0800  Gross per 24 hour   Intake 1250 ml   Output 1950 ml   Net -700 ml     URINARY CATHETER OUTPUT (Castelan):  Urethral Catheter-Output (mL): 1000 mL  Skin: no rash, turgor wnl  Heent:  eomi, mmm  Neck: no bruits or jvd noted  Cardiovascular:  S1, S2 without m/r/g  Respiratory: CTA B without w/r/r  Abdomen:  +bs, soft, nt, nd  Ext: neg lower extremity edema  Psychiatric: mood and affect appropriate  Musculoskeletal:  Rom, muscular strength intact    Data:   Labs:  CBC:   Lab Results   Component Value Date    WBC 4.5 10/11/2020    RBC 2.29 10/11/2020    HGB 7.4 10/11/2020    HCT 23.1 10/11/2020    .9 10/11/2020    MCH 32.3 10/11/2020    MCHC 32.0 10/11/2020    RDW 15.1 10/11/2020     10/11/2020    MPV 9.7 10/11/2020     BMP:    Lab Results   Component Value Date     10/11/2020    K 4.6 10/11/2020    K 6.1 10/10/2020     10/11/2020    CO2 27 10/11/2020    BUN 73 10/11/2020    LABALBU 2.1 10/11/2020    LABALBU 4.1 10/12/2011    CREATININE 3.8 10/11/2020    CALCIUM 8.2 10/11/2020    GFRAA 14 10/11/2020    LABGLOM 12 10/11/2020    GLUCOSE 155 10/11/2020    GLUCOSE 149 10/12/2011     Ionized Calcium:    Lab Results   Component Value Date    IONCA 1.59 06/26/2013     Magnesium:    Lab Results   Component Value Date    MG 2.1 10/11/2020     Phosphorus:    Lab Results   Component Value Date    PHOS 5.3 10/11/2020     FOLATE:    Lab Results   Component Value Date    FOLATE 2.6 05/04/2020     IRON:    Lab Results   Component Value Date    IRON 38 05/04/2020     Iron Saturation:  No components found for: PERCENTFE  TIBC:    Lab Results   Component Value Date    TIBC 69 05/04/2020     FERRITIN:    Lab Results   Component Value Date    FERRITIN 965 05/24/2020      Imaging:  XR CHEST PORTABLE [[id number]]  Collected: 10/10/20 0513       Order Status: Completed  Updated: 10/10/20 0518       Narrative:         EXAMINATION:   ONE XRAY VIEW OF THE CHEST     10/10/2020 4:04 am     COMPARISON:   10/09/2020     HISTORY:   ORDERING SYSTEM PROVIDED HISTORY: picc line placement verification   TECHNOLOGIST PROVIDED HISTORY:   Reason for exam:->picc line placement verification     FINDINGS:     Left PICC line tip is in the right axilla.  This is unchanged from prior. Cardiomegaly is unchanged. Pulmonary vasculature is not congested. Mediastinal and hilar contours are unchanged. The lungs are clear. The pleural spaces are clear. There is no pneumothorax.       Impression:           The tip of the left PICC line is in the left axilla.  This is unchanged from   the previous. There is otherwise no acute abnormality seen.       XR CHEST PORTABLE [[id number]]  Collected: 10/09/20 2155       Order Status: Completed  Updated: 10/09/20 2158       Narrative:         EXAMINATION:   ONE XRAY VIEW OF THE CHEST     10/9/2020 8:23 pm     COMPARISON:   May 26, 2020     HISTORY:   ORDERING SYSTEM PROVIDED HISTORY: PICC line placement verification   TECHNOLOGIST PROVIDED HISTORY:   Reason for exam:->PICC line placement verification     FINDINGS:   No airspace opacity or pleural effusion. The heart is normal size. No   pneumothorax.  No free air beneath the hemidiaphragms.       Impression:         No pneumonia or pleural effusion.           US RETROPERITONEAL COMPLETE [[id number]]  Collected: 10/10/20 1746        Order Status: Completed  Updated: 10/10/20 1810       Narrative:         EXAMINATION:   RETROPERITONEAL ULTRASOUND OF THE KIDNEYS AND URINARY BLADDER     10/10/2020     COMPARISON:   04/11/2020     HISTORY:   ORDERING SYSTEM PROVIDED HISTORY: PERLA.  UTI.  Hypertension.  Diabetes on   dialysis.  History of renal atrophy   TECHNOLOGIST PROVIDED HISTORY:   Reason for exam:->PERLA   What reading provider will be dictating this exam?->CRC FINDINGS:   RIGHT KIDNEY:     Length of visualized kidney: 9.2 cm     Cortical thickness: 7.6 mm.  Parenchyma hyperechoic again. Focal lesion: Non-specific, smoothly marginated, hypoechoic lesions are   statistically most likely cysts.  There is no evidence of mural nodularity,   wall thickening, or calcification.  Largest lesion 2.5 cm.  No significant   interval change     Calculus: None     Hydronephrosis: None     Perinephric fluid: None     Hilar calcifications may be arterial.  Similar calcifications noted in the   visualized portion of the spleen may be arterial or granulomatous. LEFT KIDNEY:     Length of visualized kidney: 7.4 cm     Cortical thickness: 7 mm.  Parenchyma hyperechoic again. Focal lesion: Non-specific, smoothly marginated, hypoechoic lesions are   statistically most likely cysts.  There is no evidence of mural nodularity,   wall thickening, or calcification.  Largest lesion 17 mm.  No significant   interval change     Calculus: None     Hydronephrosis: None     Perinephric fluid: None     URINARY BLADDER:     Not visualized.  Reportedly, catheter in place.       Impression:         No sonographic evidence of acute renal pathology     Bilateral renal lesions again most likely cysts.  These appear unchanged. Nonspecific bilateral renal parenchymal hyperechogenicity may again reflect   chronic medical renal disease        Assessment and Plan  1. Stage I PERLA   FeNa >1% which may reflect ATN or the inability for urinary concentration in the setting of the advanced CKD  UA blood trace, protein 100mg/dl, LE mod, WBC 5-10, Hernando many  Non-oliguric at this time  Cr 4.0-->3.8  JOSUÉ unremarkable  PLAN: 1. Follow strict I&O and daily weight  2. Follow Cr  3. Continue IVF    2. CKD G5  Baseline 3.0-3.3, eGFR 14-15 ml/min in 08/2020  S/P PERLA in May 2020 requiring IHD-->stopped during [facility] admission in June 2020    2. Sacral wound infection  PLAN: 1. Defer to PCP     3.  Anemia of CKD History of GIB  Hgb below goal >10  Hgb 6.7-->7.4  S/P 2 unit pRBCs  PLAN: 1. Continue ARUN and oral Iron     4. Secondary hyperparathyrodism of renal origin  Ca 8.2, PO4 5.3  PLAN: 1. Await results of PTH and Vit D     5. Hyperkalemia in the setting of the PERLA  K 4.6 this AM after being medically treated  PLAN: 1. Follow on Patiromer  2. Consult dietitian to speak with patient about Low K diet    6. Non Anion Gap Met Acidosis in the setting of the PERLA  HCO3 27 on the bicarb gtt  PLAN:1. Stop bicarb gtt   2. Follow HCO3 level    7. HTN in CKD I-IV  BP above goal <130/80  On amlodipine 10mg  PLAN: 1. Avoid ACEI/ARB due to elevated Cr  2. Start metoprolol 25mg   3. Follow BP    Thank you Dr. Renee Kendall MD for allowing us to participate in care of [address] [place] PABLO An - NP  11:37 AM  10/11/2020   Pt seen and examined-all labs reviewed  A/P:  1. Hyperkalemia in the setting of the PERLA-K+ down post Kayexalate and Insulin and Glucose infusions  PLAN:1.  To continue her on patiromer    Agree with the remainder as above    KULWINDER Adrian MD

## 2020-10-11 NOTE — PLAN OF CARE
Problem: Skin Integrity:  Goal: Absence of new skin breakdown  Description: Absence of new skin breakdown  Outcome: Met This Shift

## 2020-10-11 NOTE — PROGRESS NOTES
Subjective: The patient is awake and alert. No problems overnight. Denies chest pain, angina, and dyspnea. Denies abdominal pain. Tolerating diet. No nausea or vomiting. Objective:    BP (!) 146/62   Pulse 86   Temp 97.7 °F (36.5 °C) (Oral)   Resp 16   Ht 5' (1.524 m)   Wt 219 lb 4.8 oz (99.5 kg)   SpO2 98%   BMI 42.83 kg/m²     Heart:  RRR, no murmurs, gallops, or rubs.   Lungs:  CTA bilaterally, no wheeze, rales or rhonchi  Abd: bowel sounds present, nontender, nondistended, no masses  Extrem:  No clubbing, cyanosis, or edema    CBC with Differential:    Lab Results   Component Value Date    WBC 4.5 10/11/2020    RBC 2.29 10/11/2020    HGB 7.4 10/11/2020    HCT 23.1 10/11/2020     10/11/2020    .9 10/11/2020    MCH 32.3 10/11/2020    MCHC 32.0 10/11/2020    RDW 15.1 10/11/2020    NRBC 0.9 05/25/2020    SEGSPCT 71 08/16/2013    BANDSPCT 1 03/29/2016    METASPCT 0.9 05/10/2020    LYMPHOPCT 11.5 10/10/2020    PROMYELOPCT 0.9 05/09/2020    MONOPCT 11.9 10/10/2020    MYELOPCT 1.7 05/11/2020    BASOPCT 0.4 10/10/2020    MONOSABS 0.80 10/10/2020    LYMPHSABS 0.77 10/10/2020    EOSABS 0.05 10/10/2020    BASOSABS 0.03 10/10/2020     CMP:    Lab Results   Component Value Date     10/11/2020    K 4.6 10/11/2020    K 6.1 10/10/2020     10/11/2020    CO2 27 10/11/2020    BUN 73 10/11/2020    CREATININE 3.8 10/11/2020    GFRAA 14 10/11/2020    LABGLOM 12 10/11/2020    GLUCOSE 155 10/11/2020    GLUCOSE 149 10/12/2011    PROT 5.4 10/11/2020    LABALBU 2.1 10/11/2020    LABALBU 4.1 10/12/2011    CALCIUM 8.2 10/11/2020    BILITOT <0.2 10/11/2020    ALKPHOS 70 10/11/2020    AST 24 10/11/2020    ALT 23 10/11/2020        Assessment:    Patient Active Problem List   Diagnosis    Neurologic gait dysfunction    Renal failure, acute on chronic ([facility]    Diabetes mellitus ([facility]    Hypertension    Arthritis    Kidney disease    Acute blood loss anemia    Knee problem    Anemia due to stage 3 chronic kidney disease    PERLA (acute kidney injury) ([address]    Primary osteoarthritis of both knees    Acute on chronic renal insufficiency    Acute renal failure (HCC)    Metabolic acidosis    Acute renal failure superimposed on chronic kidney disease, on chronic dialysis ([address]    Sepsis ([facility] [address]    2019 novel coronavirus disease (COVID-19)    GI bleed    Moderate protein-calorie malnutrition (HCC)    Hyperkalemia   improved    Plan:  Probable discharge in the a.mWatson Huddleston  10:05 AM  10/11/2020

## 2020-10-12 VITALS
HEART RATE: 71 BPM | BODY MASS INDEX: 43.29 KG/M2 | DIASTOLIC BLOOD PRESSURE: 46 MMHG | SYSTOLIC BLOOD PRESSURE: 133 MMHG | TEMPERATURE: 98 F | WEIGHT: 220.5 LBS | HEIGHT: 60 IN | OXYGEN SATURATION: 97 % | RESPIRATION RATE: 16 BRPM

## 2020-10-12 LAB
ALBUMIN SERPL-MCNC: 2.2 G/DL (ref 3.5–5.2)
ALP BLD-CCNC: 75 U/L (ref 35–104)
ALT SERPL-CCNC: 26 U/L (ref 0–32)
ANION GAP SERPL CALCULATED.3IONS-SCNC: 9 MMOL/L (ref 7–16)
AST SERPL-CCNC: 27 U/L (ref 0–31)
BILIRUB SERPL-MCNC: <0.2 MG/DL (ref 0–1.2)
BUN BLDV-MCNC: 78 MG/DL (ref 8–23)
CALCIUM SERPL-MCNC: 8.5 MG/DL (ref 8.6–10.2)
CHLORIDE BLD-SCNC: 106 MMOL/L (ref 98–107)
CO2: 26 MMOL/L (ref 22–29)
CREAT SERPL-MCNC: 3.6 MG/DL (ref 0.5–1)
GFR AFRICAN AMERICAN: 15
GFR NON-AFRICAN AMERICAN: 12 ML/MIN/1.73
GLUCOSE BLD-MCNC: 105 MG/DL (ref 74–99)
HCT VFR BLD CALC: 24.4 % (ref 34–48)
HEMOGLOBIN: 7.8 G/DL (ref 11.5–15.5)
MAGNESIUM: 2.1 MG/DL (ref 1.6–2.6)
MCH RBC QN AUTO: 32.5 PG (ref 26–35)
MCHC RBC AUTO-ENTMCNC: 32 % (ref 32–34.5)
MCV RBC AUTO: 101.7 FL (ref 80–99.9)
METER GLUCOSE: 105 MG/DL (ref 74–99)
METER GLUCOSE: 110 MG/DL (ref 74–99)
PARATHYROID HORMONE INTACT: 117 PG/ML (ref 15–65)
PDW BLD-RTO: 14.3 FL (ref 11.5–15)
PHOSPHORUS: 4.4 MG/DL (ref 2.5–4.5)
PLATELET # BLD: 229 E9/L (ref 130–450)
PMV BLD AUTO: 9.7 FL (ref 7–12)
POTASSIUM SERPL-SCNC: 4.9 MMOL/L (ref 3.5–5)
RBC # BLD: 2.4 E12/L (ref 3.5–5.5)
SODIUM BLD-SCNC: 141 MMOL/L (ref 132–146)
TOTAL PROTEIN: 5.6 G/DL (ref 6.4–8.3)
WBC # BLD: 5.7 E9/L (ref 4.5–11.5)

## 2020-10-12 PROCEDURE — 36415 COLL VENOUS BLD VENIPUNCTURE: CPT

## 2020-10-12 PROCEDURE — 2580000003 HC RX 258: Performed by: NURSE PRACTITIONER

## 2020-10-12 PROCEDURE — 6370000000 HC RX 637 (ALT 250 FOR IP): Performed by: INTERNAL MEDICINE

## 2020-10-12 PROCEDURE — 84100 ASSAY OF PHOSPHORUS: CPT

## 2020-10-12 PROCEDURE — 82962 GLUCOSE BLOOD TEST: CPT

## 2020-10-12 PROCEDURE — 6360000002 HC RX W HCPCS: Performed by: NURSE PRACTITIONER

## 2020-10-12 PROCEDURE — 83970 ASSAY OF PARATHORMONE: CPT

## 2020-10-12 PROCEDURE — 85027 COMPLETE CBC AUTOMATED: CPT

## 2020-10-12 PROCEDURE — 80053 COMPREHEN METABOLIC PANEL: CPT

## 2020-10-12 PROCEDURE — 6370000000 HC RX 637 (ALT 250 FOR IP): Performed by: FAMILY MEDICINE

## 2020-10-12 PROCEDURE — 83735 ASSAY OF MAGNESIUM: CPT

## 2020-10-12 PROCEDURE — 6370000000 HC RX 637 (ALT 250 FOR IP): Performed by: NURSE PRACTITIONER

## 2020-10-12 RX ORDER — SUCRALFATE 1 G/1
1 TABLET ORAL 4 TIMES DAILY
Qty: 120 TABLET | Refills: 1 | DISCHARGE
Start: 2020-10-12 | End: 2020-11-18 | Stop reason: ALTCHOICE

## 2020-10-12 RX ORDER — ALOGLIPTIN 6.25 MG/1
6.25 TABLET, FILM COATED ORAL DAILY
Qty: 120 TABLET | DISCHARGE
Start: 2020-10-12 | End: 2020-11-18 | Stop reason: ALTCHOICE

## 2020-10-12 RX ORDER — FERROUS SULFATE 325(65) MG
325 TABLET ORAL
Qty: 30 TABLET | Refills: 3 | DISCHARGE
Start: 2020-10-12 | End: 2021-08-03

## 2020-10-12 RX ORDER — METOPROLOL SUCCINATE 25 MG/1
25 TABLET, EXTENDED RELEASE ORAL DAILY
Qty: 30 TABLET | Refills: 3 | DISCHARGE
Start: 2020-10-12

## 2020-10-12 RX ADMIN — OXYCODONE HYDROCHLORIDE AND ACETAMINOPHEN 1 TABLET: 5; 325 TABLET ORAL at 09:40

## 2020-10-12 RX ADMIN — POLYETHYLENE GLYCOL 3350 17 G: 17 POWDER, FOR SOLUTION ORAL at 09:36

## 2020-10-12 RX ADMIN — SODIUM CHLORIDE, POTASSIUM CHLORIDE, SODIUM LACTATE AND CALCIUM CHLORIDE: 600; 310; 30; 20 INJECTION, SOLUTION INTRAVENOUS at 07:59

## 2020-10-12 RX ADMIN — DOCUSATE SODIUM 100 MG: 100 CAPSULE ORAL at 09:36

## 2020-10-12 RX ADMIN — EPOETIN ALFA-EPBX 8000 UNITS: 4000 INJECTION, SOLUTION INTRAVENOUS; SUBCUTANEOUS at 09:36

## 2020-10-12 RX ADMIN — SODIUM ZIRCONIUM CYCLOSILICATE 5 G: 5 POWDER, FOR SUSPENSION ORAL at 11:06

## 2020-10-12 RX ADMIN — ALOGLIPTIN 6.25 MG: 6.25 TABLET, FILM COATED ORAL at 09:36

## 2020-10-12 RX ADMIN — METOPROLOL SUCCINATE 25 MG: 25 TABLET, EXTENDED RELEASE ORAL at 09:37

## 2020-10-12 RX ADMIN — Medication 200 MG: at 09:37

## 2020-10-12 RX ADMIN — BUSPIRONE HYDROCHLORIDE 5 MG: 5 TABLET ORAL at 09:37

## 2020-10-12 RX ADMIN — SENNOSIDES 8.6 MG: 8.6 TABLET, FILM COATED ORAL at 09:37

## 2020-10-12 RX ADMIN — SERTRALINE HYDROCHLORIDE 50 MG: 50 TABLET ORAL at 09:37

## 2020-10-12 RX ADMIN — AMLODIPINE BESYLATE 10 MG: 10 TABLET ORAL at 09:37

## 2020-10-12 RX ADMIN — FERROUS SULFATE TAB 325 MG (65 MG ELEMENTAL FE) 325 MG: 325 (65 FE) TAB at 09:37

## 2020-10-12 ASSESSMENT — PAIN SCALES - GENERAL
PAINLEVEL_OUTOF10: 0
PAINLEVEL_OUTOF10: 4
PAINLEVEL_OUTOF10: 0
PAINLEVEL_OUTOF10: 7

## 2020-10-12 ASSESSMENT — PAIN DESCRIPTION - PROGRESSION: CLINICAL_PROGRESSION: GRADUALLY IMPROVING

## 2020-10-12 ASSESSMENT — PAIN DESCRIPTION - LOCATION: LOCATION: BACK;COCCYX

## 2020-10-12 NOTE — CARE COORDINATION
10/12/2020  Social Work Discharge Planning:NABIL confirmed with son Marlys Suero that Pt will return to [facility]. SW unable to reach daughter Mary Kate Roe. N-17 generated and transport form is in chart. No precert is needed. Electronically signed by SHANTA Rajan on 10/12/2020 at 10:00 AM  Electronically signed by SHANTA Rajan on 10/12/2020 at 9:59 AM    10/12/2020  Social Work Discharge Planning:Nabil set up ambulance transport via [facility] for Pt to go to [facility] today at [facility]. Nurse here and Han Chanel at [facility] were notified. Voicemail left for jensen Suero.  Electronically signed by SHANTA Rajan on 10/12/2020 at 10:59 AM

## 2020-10-12 NOTE — PLAN OF CARE
Problem: Skin Integrity:  Goal: Will show no infection signs and symptoms  Description: Will show no infection signs and symptoms  Outcome: Completed  Goal: Absence of new skin breakdown  Description: Absence of new skin breakdown  Outcome: Completed     Problem: Falls - Risk of:  Goal: Will remain free from falls  Description: Will remain free from falls  10/12/2020 1248 by Isaias Dooley RN  Outcome: Completed  10/12/2020 0545 by Balwinder Ragsdale RN  Outcome: Met This Shift  Goal: Absence of physical injury  Description: Absence of physical injury  10/12/2020 1248 by Isaias Dooley RN  Outcome: Completed  10/12/2020 0545 by Balwinder Ragsdale RN  Outcome: Met This Shift     Problem: Pain:  Goal: Pain level will decrease  Description: Pain level will decrease  10/12/2020 1248 by Isaias Dooley RN  Outcome: Completed  10/12/2020 0545 by Balwinder Ragsdale RN  Outcome: Met This Shift  Goal: Control of acute pain  Description: Control of acute pain  10/12/2020 1248 by Isaias Dooley RN  Outcome: Completed  10/12/2020 0545 by Balwinder Ragsdale RN  Outcome: Met This Shift  Goal: Control of chronic pain  Description: Control of chronic pain  10/12/2020 1248 by Isaias Dooley RN  Outcome: Completed  10/12/2020 0545 by Balwinder Ragsdale RN  Outcome: Met This Shift

## 2020-10-12 NOTE — PROGRESS NOTES
Subjective: The patient is awake and alert. No problems overnight. Denies chest pain, angina, and dyspnea. Denies abdominal pain. Tolerating diet. No nausea or vomiting. Objective:    BP (!) 140/64   Pulse 70   Temp 98 °F (36.7 °C) (Oral)   Resp 16   Ht 5' (1.524 m)   Wt 220 lb 8 oz (100 kg)   SpO2 98%   BMI 43.06 kg/m²     Heart:  RRR, no murmurs, gallops, or rubs.   Lungs:  CTA bilaterally, no wheeze, rales or rhonchi  Abd: bowel sounds present, nontender, nondistended, no masses  Extrem:  No clubbing, cyanosis, or edema    CBC with Differential:    Lab Results   Component Value Date    WBC 5.7 10/12/2020    RBC 2.40 10/12/2020    HGB 7.8 10/12/2020    HCT 24.4 10/12/2020     10/12/2020    .7 10/12/2020    MCH 32.5 10/12/2020    MCHC 32.0 10/12/2020    RDW 14.3 10/12/2020    NRBC 0.9 05/25/2020    SEGSPCT 71 08/16/2013    BANDSPCT 1 03/29/2016    METASPCT 0.9 05/10/2020    LYMPHOPCT 11.5 10/10/2020    PROMYELOPCT 0.9 05/09/2020    MONOPCT 11.9 10/10/2020    MYELOPCT 1.7 05/11/2020    BASOPCT 0.4 10/10/2020    MONOSABS 0.80 10/10/2020    LYMPHSABS 0.77 10/10/2020    EOSABS 0.05 10/10/2020    BASOSABS 0.03 10/10/2020     CMP:    Lab Results   Component Value Date     10/12/2020    K 4.9 10/12/2020    K 6.1 10/10/2020     10/12/2020    CO2 26 10/12/2020    BUN 78 10/12/2020    CREATININE 3.6 10/12/2020    GFRAA 15 10/12/2020    LABGLOM 12 10/12/2020    GLUCOSE 105 10/12/2020    GLUCOSE 149 10/12/2011    PROT 5.6 10/12/2020    LABALBU 2.2 10/12/2020    LABALBU 4.1 10/12/2011    CALCIUM 8.5 10/12/2020    BILITOT <0.2 10/12/2020    ALKPHOS 75 10/12/2020    AST 27 10/12/2020    ALT 26 10/12/2020        Assessment:    Patient Active Problem List   Diagnosis    Neurologic gait dysfunction    Renal failure, acute on chronic ([facility] [address]    Diabetes mellitus ([facility]    Hypertension    Arthritis    Kidney disease    Acute blood loss anemia    Knee problem    Anemia due to stage 3 chronic kidney disease    PERLA (acute kidney injury) ([address]    Primary osteoarthritis of both knees    Acute on chronic renal insufficiency    Acute renal failure (HCC)    Metabolic acidosis    Acute renal failure superimposed on chronic kidney disease, on chronic dialysis ([address]    Sepsis ([address]    2019 novel coronavirus disease (COVID-19)    GI bleed    Moderate protein-calorie malnutrition ([address]    Hyperkalemia       Plan:  D/C        Rivka Peoples  7:54 AM  10/12/2020

## 2020-10-12 NOTE — DISCHARGE INSTR - DIET
5 dates 270   Dried beans and peas ½ cup 300-475   Figs, dried 2 each 260   Fish: halibut, tuna, cod, snapper 3 oz 480   Fish: salmon, uzma, swordfish, perch 3 oz 300   Fish, tuna, canned 3 oz 200   Western Lydia fries, fast food 3 oz/small 470   Granola with fruit, nuts ½ cup 200   Grapefruit juice ½ cup 200   Greens, beet ½ cup 655   Honeydew melon ½ cup 200   Kale, raw 1 cup 300   Kiwi 1 medium 240   Kohlrabi, rutabaga, parsnips ½ cup 280   Lentils ½ cup 365   Nirav 1 each 325   Milk, chocolate 1 cup 420   Milk, fat free, low fat, whole, buttermilk 1 cup 350-380   Molasses 1 Tbsp 295   Mushrooms ½ cup 280   Nectarine 1 each 275   Nuts: almonds, peanuts, hazelnuts, [place], cashew, mixed 1 oz 200   Nuts, pistachios 1 oz 295   Orange 1 each 240   Orange juice ½ cup 235   Papaya, medium ½ fruit 390   Peanut butter, chunky 2 Tbsp 240   Peanut butter, smooth 2 Tbsp 210   Pear 1 medium 200   Pomegranate 1 whole 400   Pomegranate juice ½ cup 215   Pork 3 oz 350   Potato chips, salted 1 oz 465   Potato, baked with skin 1 medium 925   Potatoes, boiled ½ cup 255   Potatoes, mashed ½ cup 330   Prune juice ½ cup 370   Prunes 5 each 305   Pudding, chocolate ½ cup 230   Pumpkin, canned ½ cup 250   Raisins, seedless ¼ cup 270   Seeds, sunflower or pumpkin 1 oz 240   Soy milk 1 cup 300   Spinach ½ cup 420   Spinach, canned ½ cup 370   Sweet potato, baked with skin 1 medium 450   Swiss chard ½ cup 480   Tomato or vegetable juice ½ cup 275   Tomato sauce or puree ½ cup 400-550   Tomato, raw 1 medium 290   Tomatoes, canned ½ cup 200-300   [place] 3 oz 250   Wheat germ 1 oz 250   Winter squash ½ cup 250   Yogurt, plain or fruited 6 oz 260-435   Zucchini ½ cup 220             Potassium ( mg)  Food Serving Potassium (mg)   Apple 1 each 150   Apple juice ½ cup 150   Applesauce ½ cup 90   Apricot nectar ½ cup 140   Asparagus, small hennessy ½ cup or 6 hennessy 155   Bagel, cinnamon raisin 1 each 130   Bagel, 4?: egg or plain 1 each 70 Beans, green ½ cup 90   Beans, yellow ½ cup 190   Beer, regular 12 oz 100   Beets, canned ½ cup 125   Blackberries ½ cup 115   Blueberries ½ cup 60   Bread, whole wheat 1 slice 70   Broccoli, raw ½ cup 145   Cabbage ½ cup 150   Carrots, cooked or raw ½ cup 180   Cauliflower, raw ½ cup 150   Celery, raw ½ cup 155   Cereal, bran flakes ½ cup 120-150   Cheese, cottage ½ cup 110   Cherries 10 each 150   Chocolate 1½-oz bar 165   Coffee, brewed 6 oz 90   Corn ½ cup or 1 ear 195   Cucumbers ½ cup 80   Egg, large 1 each 60   Eggplant ½ cup 60   Endive, raw ½ cup 80   English muffin 1 each 65   Fish, orange roughy 3 oz 150   Frankfurter, beef/pork 1 each 75   Fruit cocktail ½ cup 115   Grape juice ½ cup 170   Grapefruit ½ fruit 175   Grapes ½ cup 155   Greens: kale, turnip, afia ½ cup 110-150   Ice cream or frozen yogurt, chocolate ½ cup 175   Ice cream or frozen yogurt, vanilla ½ cup 120-150   Haroon, limes 1 each 80   Lettuce, all types 1 cup 100   Mixed vegetables ½ cup 150   Mushrooms, raw ½ cup 110   Nuts: walnuts, pecans, macadamia 1 oz 125   Oatmeal ½ cup 80   Okra ½ cup 110   Onions, raw ½ cup 120   Peach 1 each 185   Peaches, canned ½ cup 120   Pears, canned ½ cup 120   Peas, green, frozen ½ cup 90   Peppers, green ½ cup 130   Peppers, red ½ cup 160   Pineapple juice ½ cup 165   Pineapple, fresh or canned ½ cup 100   Plums 1 each 105   Pudding, vanilla ½ cup 150   Raspberries ½ cup 90   Rhubarb ½ cup 115   Rice, wild ½ cup 80   Shrimp 3 oz 155   Spinach, raw 1 cup 170   Strawberries ½ cup 125   Summer squash ½ cup 175-200   Swiss chard, raw 1 cup 135   Tangerines 1 each 140   Tea, brewed 6 oz 65   Turnips ½ cup 140   Watermelon ½ cup 85   Wine, red, table 5 oz 180   Wine, white, table 5 oz 100             Lower Potassium (less than 50 mg)  Food Serving Potassium (mg)   Bread, white 1 slice 30   Carbonated beverages 12 oz < 5   Cheese 1 oz 20-30   Cranberries ½ cup 45   Cranberry juice cocktail ½ cup 20   Fats and oils 1 Tbsp < 5   Hummus 1 Tbsp 32   Nectar: papaya, luda, or pear ½ cup 35   Rice, white or brown ½ cup 50   Spaghetti/macaroni, cooked ½ cup 30   Tortilla, flour or corn 1 each 50   Waffle, 4? 1 each 50   Water chestnuts ½ cup 40

## 2020-10-12 NOTE — CARE COORDINATION
Communication with Rosa Siegel from [facility] - patient is eligible for bed hold as per Medicaid. Patient can return without auth when medically ready. SW confirming dc plan with patient, but would anticipate return to facility later today. Fahad Vogel.  Dianna, MSN, RN  [place] [address] Case Management  [phone]

## 2020-10-19 NOTE — CONSULTS
Consults   See dictated consult for details:  Imp:  Right lower lobe community-acquired pneumonia  Possible left upper lobe PE. Perfusion defect on VQ  CKD  Hx Stage 1 sarcoid    Plan:  Check Sputum Cx, Urine Legionella and Strep Ag, Respiratory panel film array  Antibiotics for CAP  Check lower extremity ultrasound  Check echo   In light of renal function, heparin drip and bridge to coumadin for six months for a \"provoked\" clot  Repeat CXR in 1 month  PFTs at follow up  Will follow with you  Thanks!!!   Rosendo Hannah D.O.  [phone] no

## 2020-11-03 PROBLEM — N17.9 AKI (ACUTE KIDNEY INJURY) (HCC): Status: RESOLVED | Noted: 2019-07-18 | Resolved: 2020-11-03

## 2020-11-17 ENCOUNTER — HOSPITAL ENCOUNTER (INPATIENT)
Age: 70
LOS: 1 days | Discharge: NURSING, REHAB OR LONG-TERM CARE | DRG: 641 | End: 2020-11-19
Attending: EMERGENCY MEDICINE | Admitting: FAMILY MEDICINE
Payer: MEDICARE

## 2020-11-17 LAB
BASOPHILS ABSOLUTE: 0.05 E9/L (ref 0–0.2)
BASOPHILS RELATIVE PERCENT: 0.8 % (ref 0–2)
EOSINOPHILS ABSOLUTE: 0.33 E9/L (ref 0.05–0.5)
EOSINOPHILS RELATIVE PERCENT: 5.2 % (ref 0–6)
HCT VFR BLD CALC: 32.9 % (ref 34–48)
HEMOGLOBIN: 10.2 G/DL (ref 11.5–15.5)
IMMATURE GRANULOCYTES #: 0.02 E9/L
IMMATURE GRANULOCYTES %: 0.3 % (ref 0–5)
LYMPHOCYTES ABSOLUTE: 1.15 E9/L (ref 1.5–4)
LYMPHOCYTES RELATIVE PERCENT: 18.2 % (ref 20–42)
MCH RBC QN AUTO: 32 PG (ref 26–35)
MCHC RBC AUTO-ENTMCNC: 31 % (ref 32–34.5)
MCV RBC AUTO: 103.1 FL (ref 80–99.9)
MONOCYTES ABSOLUTE: 0.75 E9/L (ref 0.1–0.95)
MONOCYTES RELATIVE PERCENT: 11.9 % (ref 2–12)
NEUTROPHILS ABSOLUTE: 4.01 E9/L (ref 1.8–7.3)
NEUTROPHILS RELATIVE PERCENT: 63.6 % (ref 43–80)
PDW BLD-RTO: 13.4 FL (ref 11.5–15)
PLATELET # BLD: 286 E9/L (ref 130–450)
PMV BLD AUTO: 9.5 FL (ref 7–12)
RBC # BLD: 3.19 E12/L (ref 3.5–5.5)
WBC # BLD: 6.3 E9/L (ref 4.5–11.5)

## 2020-11-17 PROCEDURE — 93005 ELECTROCARDIOGRAM TRACING: CPT | Performed by: EMERGENCY MEDICINE

## 2020-11-17 PROCEDURE — 99285 EMERGENCY DEPT VISIT HI MDM: CPT

## 2020-11-17 PROCEDURE — 80048 BASIC METABOLIC PNL TOTAL CA: CPT

## 2020-11-17 PROCEDURE — 85025 COMPLETE CBC W/AUTO DIFF WBC: CPT

## 2020-11-17 ASSESSMENT — PAIN SCALES - GENERAL: PAINLEVEL_OUTOF10: 0

## 2020-11-18 PROBLEM — N17.9 AKI (ACUTE KIDNEY INJURY) (HCC): Status: ACTIVE | Noted: 2020-11-18

## 2020-11-18 LAB
ANION GAP SERPL CALCULATED.3IONS-SCNC: 10 MMOL/L (ref 7–16)
BACTERIA: ABNORMAL /HPF
BILIRUBIN URINE: NEGATIVE
BLOOD, URINE: ABNORMAL
BUN BLDV-MCNC: 81 MG/DL (ref 8–23)
CALCIUM SERPL-MCNC: 9 MG/DL (ref 8.6–10.2)
CHLORIDE BLD-SCNC: 106 MMOL/L (ref 98–107)
CHP ED QC CHECK: YES
CLARITY: ABNORMAL
CO2: 22 MMOL/L (ref 22–29)
COLOR: YELLOW
CREAT SERPL-MCNC: 4.8 MG/DL (ref 0.5–1)
EKG ATRIAL RATE: 60 BPM
EKG P AXIS: 41 DEGREES
EKG P-R INTERVAL: 250 MS
EKG Q-T INTERVAL: 530 MS
EKG QRS DURATION: 144 MS
EKG QTC CALCULATION (BAZETT): 530 MS
EKG R AXIS: -33 DEGREES
EKG T AXIS: 56 DEGREES
EKG VENTRICULAR RATE: 60 BPM
EPITHELIAL CELLS, UA: ABNORMAL /HPF
GFR AFRICAN AMERICAN: 11
GFR NON-AFRICAN AMERICAN: 9 ML/MIN/1.73
GLUCOSE BLD-MCNC: 141 MG/DL
GLUCOSE BLD-MCNC: 188 MG/DL
GLUCOSE BLD-MCNC: 91 MG/DL
GLUCOSE BLD-MCNC: 92 MG/DL
GLUCOSE BLD-MCNC: 95 MG/DL (ref 74–99)
GLUCOSE URINE: NEGATIVE MG/DL
KETONES, URINE: NEGATIVE MG/DL
LEUKOCYTE ESTERASE, URINE: ABNORMAL
METER GLUCOSE: 131 MG/DL (ref 74–99)
METER GLUCOSE: 141 MG/DL (ref 74–99)
METER GLUCOSE: 188 MG/DL (ref 74–99)
METER GLUCOSE: 215 MG/DL (ref 74–99)
METER GLUCOSE: 91 MG/DL (ref 74–99)
METER GLUCOSE: 92 MG/DL (ref 74–99)
NITRITE, URINE: NEGATIVE
PH UA: 6 (ref 5–9)
POTASSIUM REFLEX MAGNESIUM: 5.9 MMOL/L (ref 3.5–5)
POTASSIUM SERPL-SCNC: 5 MMOL/L (ref 3.5–5)
PROTEIN UA: 100 MG/DL
RBC UA: ABNORMAL /HPF (ref 0–2)
SODIUM BLD-SCNC: 138 MMOL/L (ref 132–146)
SPECIFIC GRAVITY UA: 1.02 (ref 1–1.03)
UROBILINOGEN, URINE: 0.2 E.U./DL
WBC UA: >20 /HPF (ref 0–5)

## 2020-11-18 PROCEDURE — G0378 HOSPITAL OBSERVATION PER HR: HCPCS

## 2020-11-18 PROCEDURE — 2500000003 HC RX 250 WO HCPCS: Performed by: STUDENT IN AN ORGANIZED HEALTH CARE EDUCATION/TRAINING PROGRAM

## 2020-11-18 PROCEDURE — 2060000000 HC ICU INTERMEDIATE R&B

## 2020-11-18 PROCEDURE — 6370000000 HC RX 637 (ALT 250 FOR IP): Performed by: STUDENT IN AN ORGANIZED HEALTH CARE EDUCATION/TRAINING PROGRAM

## 2020-11-18 PROCEDURE — 87077 CULTURE AEROBIC IDENTIFY: CPT

## 2020-11-18 PROCEDURE — 84132 ASSAY OF SERUM POTASSIUM: CPT

## 2020-11-18 PROCEDURE — 94664 DEMO&/EVAL PT USE INHALER: CPT

## 2020-11-18 PROCEDURE — 81001 URINALYSIS AUTO W/SCOPE: CPT

## 2020-11-18 PROCEDURE — 6370000000 HC RX 637 (ALT 250 FOR IP): Performed by: INTERNAL MEDICINE

## 2020-11-18 PROCEDURE — 2580000003 HC RX 258: Performed by: STUDENT IN AN ORGANIZED HEALTH CARE EDUCATION/TRAINING PROGRAM

## 2020-11-18 PROCEDURE — 87088 URINE BACTERIA CULTURE: CPT

## 2020-11-18 PROCEDURE — 96365 THER/PROPH/DIAG IV INF INIT: CPT

## 2020-11-18 PROCEDURE — 96375 TX/PRO/DX INJ NEW DRUG ADDON: CPT

## 2020-11-18 PROCEDURE — 6370000000 HC RX 637 (ALT 250 FOR IP): Performed by: FAMILY MEDICINE

## 2020-11-18 PROCEDURE — 82962 GLUCOSE BLOOD TEST: CPT

## 2020-11-18 PROCEDURE — 6360000002 HC RX W HCPCS: Performed by: STUDENT IN AN ORGANIZED HEALTH CARE EDUCATION/TRAINING PROGRAM

## 2020-11-18 PROCEDURE — 94640 AIRWAY INHALATION TREATMENT: CPT

## 2020-11-18 PROCEDURE — 93010 ELECTROCARDIOGRAM REPORT: CPT | Performed by: INTERNAL MEDICINE

## 2020-11-18 PROCEDURE — 96372 THER/PROPH/DIAG INJ SC/IM: CPT

## 2020-11-18 PROCEDURE — 87186 SC STD MICRODIL/AGAR DIL: CPT

## 2020-11-18 RX ORDER — HEPARIN SODIUM 10000 [USP'U]/ML
5000 INJECTION, SOLUTION INTRAVENOUS; SUBCUTANEOUS EVERY 8 HOURS SCHEDULED
Status: DISCONTINUED | OUTPATIENT
Start: 2020-11-18 | End: 2020-11-19 | Stop reason: HOSPADM

## 2020-11-18 RX ORDER — ASCORBIC ACID 500 MG
500 TABLET ORAL DAILY
COMMUNITY
End: 2021-05-03 | Stop reason: ALTCHOICE

## 2020-11-18 RX ORDER — LANOLIN ALCOHOL/MO/W.PET/CERES
3 CREAM (GRAM) TOPICAL NIGHTLY PRN
Status: DISCONTINUED | OUTPATIENT
Start: 2020-11-18 | End: 2020-11-19 | Stop reason: HOSPADM

## 2020-11-18 RX ORDER — ONDANSETRON 2 MG/ML
4 INJECTION INTRAMUSCULAR; INTRAVENOUS EVERY 6 HOURS PRN
Status: DISCONTINUED | OUTPATIENT
Start: 2020-11-18 | End: 2020-11-19

## 2020-11-18 RX ORDER — NICOTINE POLACRILEX 4 MG
15 LOZENGE BUCCAL PRN
Status: DISCONTINUED | OUTPATIENT
Start: 2020-11-18 | End: 2020-11-19 | Stop reason: HOSPADM

## 2020-11-18 RX ORDER — SODIUM POLYSTYRENE SULFONATE 15 G/60ML
15 SUSPENSION ORAL; RECTAL ONCE
Status: ON HOLD | COMMUNITY
End: 2021-01-08

## 2020-11-18 RX ORDER — GABAPENTIN 300 MG/1
300 CAPSULE ORAL 2 TIMES DAILY
Status: DISCONTINUED | OUTPATIENT
Start: 2020-11-18 | End: 2020-11-19 | Stop reason: HOSPADM

## 2020-11-18 RX ORDER — OXYCODONE HYDROCHLORIDE AND ACETAMINOPHEN 5; 325 MG/1; MG/1
1 TABLET ORAL EVERY 8 HOURS PRN
Status: DISCONTINUED | OUTPATIENT
Start: 2020-11-18 | End: 2020-11-19 | Stop reason: HOSPADM

## 2020-11-18 RX ORDER — POLYETHYLENE GLYCOL 3350 17 G/17G
17 POWDER, FOR SOLUTION ORAL DAILY
Status: DISCONTINUED | OUTPATIENT
Start: 2020-11-18 | End: 2020-11-19 | Stop reason: HOSPADM

## 2020-11-18 RX ORDER — ASCORBIC ACID 500 MG
500 TABLET ORAL DAILY
Status: DISCONTINUED | OUTPATIENT
Start: 2020-11-18 | End: 2020-11-19 | Stop reason: HOSPADM

## 2020-11-18 RX ORDER — INSULIN GLARGINE 100 [IU]/ML
10 INJECTION, SOLUTION SUBCUTANEOUS NIGHTLY
Status: DISCONTINUED | OUTPATIENT
Start: 2020-11-18 | End: 2020-11-19 | Stop reason: HOSPADM

## 2020-11-18 RX ORDER — SODIUM CHLORIDE 0.9 % (FLUSH) 0.9 %
10 SYRINGE (ML) INJECTION EVERY 12 HOURS SCHEDULED
Status: DISCONTINUED | OUTPATIENT
Start: 2020-11-18 | End: 2020-11-19 | Stop reason: HOSPADM

## 2020-11-18 RX ORDER — CALCIUM GLUCONATE 94 MG/ML
1 INJECTION, SOLUTION INTRAVENOUS ONCE
Status: DISCONTINUED | OUTPATIENT
Start: 2020-11-18 | End: 2020-11-18 | Stop reason: CLARIF

## 2020-11-18 RX ORDER — ALOGLIPTIN 6.25 MG/1
6.25 TABLET, FILM COATED ORAL DAILY
Status: DISCONTINUED | OUTPATIENT
Start: 2020-11-18 | End: 2020-11-19 | Stop reason: HOSPADM

## 2020-11-18 RX ORDER — BUSPIRONE HYDROCHLORIDE 5 MG/1
5 TABLET ORAL 3 TIMES DAILY
Status: DISCONTINUED | OUTPATIENT
Start: 2020-11-18 | End: 2020-11-19 | Stop reason: HOSPADM

## 2020-11-18 RX ORDER — ESCITALOPRAM OXALATE 10 MG/1
10 TABLET ORAL DAILY
Status: DISCONTINUED | OUTPATIENT
Start: 2020-11-18 | End: 2020-11-19 | Stop reason: HOSPADM

## 2020-11-18 RX ORDER — ZINC SULFATE 50(220)MG
220 CAPSULE ORAL DAILY
COMMUNITY
End: 2021-05-03

## 2020-11-18 RX ORDER — ONDANSETRON 4 MG/1
4 TABLET, FILM COATED ORAL EVERY 6 HOURS PRN
Status: DISCONTINUED | OUTPATIENT
Start: 2020-11-18 | End: 2020-11-18 | Stop reason: ALTCHOICE

## 2020-11-18 RX ORDER — SENNA PLUS 8.6 MG/1
1 TABLET ORAL 2 TIMES DAILY
Status: DISCONTINUED | OUTPATIENT
Start: 2020-11-18 | End: 2020-11-19 | Stop reason: HOSPADM

## 2020-11-18 RX ORDER — ZINC SULFATE 50(220)MG
50 CAPSULE ORAL DAILY
Status: DISCONTINUED | OUTPATIENT
Start: 2020-11-18 | End: 2020-11-19 | Stop reason: HOSPADM

## 2020-11-18 RX ORDER — ACETAMINOPHEN 325 MG/1
650 TABLET ORAL EVERY 6 HOURS PRN
Status: DISCONTINUED | OUTPATIENT
Start: 2020-11-18 | End: 2020-11-19 | Stop reason: HOSPADM

## 2020-11-18 RX ORDER — PROMETHAZINE HYDROCHLORIDE 25 MG/1
12.5 TABLET ORAL EVERY 6 HOURS PRN
Status: DISCONTINUED | OUTPATIENT
Start: 2020-11-18 | End: 2020-11-19

## 2020-11-18 RX ORDER — DOCUSATE SODIUM 100 MG/1
100 CAPSULE, LIQUID FILLED ORAL 2 TIMES DAILY
Status: DISCONTINUED | OUTPATIENT
Start: 2020-11-18 | End: 2020-11-19 | Stop reason: HOSPADM

## 2020-11-18 RX ORDER — M-VIT,TX,IRON,MINS/CALC/FOLIC 27MG-0.4MG
1 TABLET ORAL DAILY
Status: DISCONTINUED | OUTPATIENT
Start: 2020-11-18 | End: 2020-11-19 | Stop reason: HOSPADM

## 2020-11-18 RX ORDER — THIAMINE MONONITRATE (VIT B1) 100 MG
200 TABLET ORAL DAILY
Status: DISCONTINUED | OUTPATIENT
Start: 2020-11-18 | End: 2020-11-19 | Stop reason: HOSPADM

## 2020-11-18 RX ORDER — FUROSEMIDE 10 MG/ML
40 INJECTION INTRAMUSCULAR; INTRAVENOUS ONCE
Status: COMPLETED | OUTPATIENT
Start: 2020-11-18 | End: 2020-11-18

## 2020-11-18 RX ORDER — FERROUS SULFATE 325(65) MG
325 TABLET ORAL
Status: DISCONTINUED | OUTPATIENT
Start: 2020-11-18 | End: 2020-11-19 | Stop reason: HOSPADM

## 2020-11-18 RX ORDER — DEXTROSE MONOHYDRATE 50 MG/ML
100 INJECTION, SOLUTION INTRAVENOUS PRN
Status: DISCONTINUED | OUTPATIENT
Start: 2020-11-18 | End: 2020-11-19 | Stop reason: HOSPADM

## 2020-11-18 RX ORDER — DEXTROSE MONOHYDRATE 25 G/50ML
12.5 INJECTION, SOLUTION INTRAVENOUS PRN
Status: DISCONTINUED | OUTPATIENT
Start: 2020-11-18 | End: 2020-11-19 | Stop reason: HOSPADM

## 2020-11-18 RX ORDER — METOPROLOL SUCCINATE 25 MG/1
25 TABLET, EXTENDED RELEASE ORAL DAILY
Status: DISCONTINUED | OUTPATIENT
Start: 2020-11-18 | End: 2020-11-19 | Stop reason: HOSPADM

## 2020-11-18 RX ORDER — M-VIT,TX,IRON,MINS/CALC/FOLIC 27MG-0.4MG
1 TABLET ORAL DAILY
COMMUNITY
End: 2021-05-03 | Stop reason: ALTCHOICE

## 2020-11-18 RX ORDER — ACETAMINOPHEN 650 MG/1
650 SUPPOSITORY RECTAL EVERY 6 HOURS PRN
Status: DISCONTINUED | OUTPATIENT
Start: 2020-11-18 | End: 2020-11-19 | Stop reason: HOSPADM

## 2020-11-18 RX ORDER — DEXTROSE MONOHYDRATE 25 G/50ML
25 INJECTION, SOLUTION INTRAVENOUS ONCE
Status: COMPLETED | OUTPATIENT
Start: 2020-11-18 | End: 2020-11-18

## 2020-11-18 RX ORDER — SODIUM CHLORIDE 0.9 % (FLUSH) 0.9 %
10 SYRINGE (ML) INJECTION PRN
Status: DISCONTINUED | OUTPATIENT
Start: 2020-11-18 | End: 2020-11-19 | Stop reason: HOSPADM

## 2020-11-18 RX ADMIN — GABAPENTIN 300 MG: 300 CAPSULE ORAL at 19:58

## 2020-11-18 RX ADMIN — INSULIN HUMAN 10 UNITS: 100 INJECTION, SOLUTION PARENTERAL at 02:52

## 2020-11-18 RX ADMIN — Medication 500 MG: at 10:49

## 2020-11-18 RX ADMIN — INSULIN GLARGINE 10 UNITS: 100 INJECTION, SOLUTION SUBCUTANEOUS at 19:58

## 2020-11-18 RX ADMIN — FERROUS SULFATE TAB 325 MG (65 MG ELEMENTAL FE) 325 MG: 325 (65 FE) TAB at 10:52

## 2020-11-18 RX ADMIN — DEXTROSE MONOHYDRATE 25 G: 25 INJECTION, SOLUTION INTRAVENOUS at 02:50

## 2020-11-18 RX ADMIN — SODIUM BICARBONATE 50 MEQ: 84 INJECTION, SOLUTION INTRAVENOUS at 02:54

## 2020-11-18 RX ADMIN — SENNOSIDES 8.6 MG: 8.6 TABLET, FILM COATED ORAL at 19:58

## 2020-11-18 RX ADMIN — CALCIUM GLUCONATE 1 G: 98 INJECTION, SOLUTION INTRAVENOUS at 01:31

## 2020-11-18 RX ADMIN — METOPROLOL SUCCINATE 25 MG: 25 TABLET, EXTENDED RELEASE ORAL at 10:46

## 2020-11-18 RX ADMIN — MULTIPLE VITAMINS W/ MINERALS TAB 1 TABLET: TAB at 10:51

## 2020-11-18 RX ADMIN — SODIUM ZIRCONIUM CYCLOSILICATE 5 G: 5 POWDER, FOR SUSPENSION ORAL at 18:32

## 2020-11-18 RX ADMIN — ALOGLIPTIN 6.25 MG: 6.25 TABLET, FILM COATED ORAL at 10:50

## 2020-11-18 RX ADMIN — ESCITALOPRAM OXALATE 10 MG: 10 TABLET ORAL at 10:51

## 2020-11-18 RX ADMIN — FUROSEMIDE 40 MG: 10 INJECTION, SOLUTION INTRAMUSCULAR; INTRAVENOUS at 02:58

## 2020-11-18 RX ADMIN — GABAPENTIN 300 MG: 300 CAPSULE ORAL at 10:47

## 2020-11-18 RX ADMIN — BUSPIRONE HYDROCHLORIDE 5 MG: 5 TABLET ORAL at 23:18

## 2020-11-18 RX ADMIN — ZINC SULFATE 220 MG (50 MG) CAPSULE 50 MG: CAPSULE at 10:52

## 2020-11-18 RX ADMIN — Medication 200 MG: at 10:52

## 2020-11-18 RX ADMIN — DOCUSATE SODIUM 100 MG: 100 CAPSULE, LIQUID FILLED ORAL at 19:58

## 2020-11-18 RX ADMIN — ALBUTEROL SULFATE 10 MG: 2.5 SOLUTION RESPIRATORY (INHALATION) at 02:10

## 2020-11-18 ASSESSMENT — ENCOUNTER SYMPTOMS
CONSTIPATION: 0
ABDOMINAL PAIN: 0
NAUSEA: 0
DIARRHEA: 0
VOMITING: 0
COLOR CHANGE: 0
SHORTNESS OF BREATH: 0
ABDOMINAL DISTENTION: 0

## 2020-11-18 ASSESSMENT — PAIN SCALES - GENERAL
PAINLEVEL_OUTOF10: 0
PAINLEVEL_OUTOF10: 0

## 2020-11-18 NOTE — ED PROVIDER NOTES
[facility] ENCOUNTER      Pt Name: Ezio Topete  MRN: [id number]  [place] 1950  Date of evaluation: 11/17/2020      CHIEF COMPLAINT       Chief Complaint   Patient presents with    Abnormal Lab     K+ 6.3, Cr 4.8 (received kayexalate PTA); hx CKD 4        HPI  Ezio Topete is a 69 y.o. female DNR CC , past medical history of diabetes, CKD presents from her assisted living facility [address] [place] [address] with abnormal labs. Patient's potassium was 6.3, with a creatinine of 4.8 this morning. They gave the patient Kayexalate and transfer the patient to the emergency room for further evaluation. As per patient she has no complaints at this time. States that she feels well. And that she is only in here because she had abnormal labs. No alleviating or exacerbating factors. No other associated symptoms. Denies any headache, nausea, vomiting, chest pain, shortness of breath, abdominal pain, flank pain, dysuria, hematuria, diarrhea, patient no new rashes or sores. Except as noted above the remainder of the review of systems was reviewed and negative. Review of Systems   Constitutional: Negative for activity change, appetite change, diaphoresis, fatigue, fever and unexpected weight change. HENT: Negative for congestion. Respiratory: Negative for shortness of breath. Cardiovascular: Negative for chest pain, palpitations and leg swelling. Gastrointestinal: Negative for abdominal distention, abdominal pain, constipation, diarrhea, nausea and vomiting. Endocrine: Negative for polyphagia and polyuria. Musculoskeletal: Negative for arthralgias. Skin: Negative for color change, pallor, rash and wound. Neurological: Negative for dizziness. Hematological: Negative for adenopathy. Does not bruise/bleed easily. Psychiatric/Behavioral: Negative for agitation. Physical Exam  Vitals signs and nursing note reviewed. Constitutional:       Appearance: Normal appearance. She is well-developed. She is not ill-appearing or diaphoretic. HENT:      Head: Normocephalic and atraumatic. Nose: Nose normal.      Mouth/Throat:      Mouth: Mucous membranes are moist.      Pharynx: Oropharynx is clear. Eyes:      General:         Right eye: No discharge. Left eye: No discharge. Extraocular Movements: Extraocular movements intact. Pupils: Pupils are equal, round, and reactive to light. Neck:      Musculoskeletal: Normal range of motion and neck supple. No neck rigidity or muscular tenderness. Vascular: No carotid bruit. Cardiovascular:      Rate and Rhythm: Normal rate and regular rhythm. Pulses: Normal pulses. Heart sounds: Normal heart sounds. Pulmonary:      Effort: Pulmonary effort is normal. No respiratory distress. Breath sounds: Normal breath sounds. No wheezing or rales. Abdominal:      General: Bowel sounds are normal.      Palpations: Abdomen is soft. Tenderness: There is no abdominal tenderness. There is no right CVA tenderness, left CVA tenderness, guarding or rebound. Musculoskeletal: Normal range of motion. Skin:     General: Skin is warm and dry. Neurological:      Mental Status: She is alert and oriented to person, place, and time. Cranial Nerves: No cranial nerve deficit. Coordination: Coordination normal.   Psychiatric:         Mood and Affect: Mood normal.          Procedures     MDM  Number of Diagnoses or Management Options  Diagnosis management comments: 39-year-old female with past medical history of diabetes, CKD stage IV, presents from her assisted living facility [address] [place] [address] with abnormal labs. She was hyperkalemic with an elevated creatinine 1.3. She was sent to the emergency room for further evaluation. Vitals within normal limits in the department. On physical exam patient is no acute distress, speaking full sentences, unit of 3. Lungs are clear to auscultation bilaterally. No wheeze, rales, rhonchi. Normal S1-S2. Abdomen soft nontender. Diagnostic labs are significant for hyperkalemia of 5.9 and a creatinine of 4.8. This is much higher than the patient's baseline. EKG showed peaked T waves and prolonged QTC. Patient was given sodium bicarb, calcium, insulin, D50, to treat patient's hyperkalemia. Dr. Saman Girard was consulted outpatient. She states she will manage the patient in the hospital..  Dr. Siddhartha Desai admitted patient for worsening PERLA and hyperkalemia.            --------------------------------------------- PAST HISTORY ---------------------------------------------  Past Medical History:  has a past medical history of Anemia, Anxiety, Arthritis, Chronic knee pain, Chronic pain syndrome, CKD (chronic kidney disease) stage 4, GFR 15-29 ml/min (HCC), Depression, Diabetes mellitus ([address] Dysphagia, oral phase, Gall stones, Generalized muscle weakness, Hypertension, Hypertensive kidney disease with stage 4 chronic kidney disease ([address] Kidney disease, Kidney stones, Obesity, Protein calorie malnutrition ([address] Sacral pressure ulcer, Sarcoidosis, SOBOE (shortness of breath on exertion), Tremor, Unspecified osteoarthritis, unspecified site, Urinary anomaly, and UTI (urinary tract infection). Past Surgical History:  has a past surgical history that includes Foot surgery ( ); Cystocopy (2011 );  section (x3); Upper gastrointestinal endoscopy (2.18.15); Cholecystectomy (3/31/16); Abdomen surgery (N/A, 2020); Upper gastrointestinal endoscopy (N/A, 2020); and Upper gastrointestinal endoscopy (N/A, 2020). Social History:  reports that she quit smoking about 9 years ago. She has a 30.00 pack-year smoking history. She has never used smokeless tobacco. She reports that she does not drink alcohol or use drugs. Family History: family history includes Cancer in her sister.      The patients home medications have been reviewed. Allergies: Patient has no known allergies.     -------------------------------------------------- RESULTS -------------------------------------------------    LABS:  Results for orders placed or performed during the hospital encounter of 11/17/20   CBC Auto Differential   Result Value Ref Range    WBC 6.3 4.5 - 11.5 E9/L    RBC 3.19 (L) 3.50 - 5.50 E12/L    Hemoglobin 10.2 (L) 11.5 - 15.5 g/dL    Hematocrit 32.9 (L) 34.0 - 48.0 %    .1 (H) 80.0 - 99.9 fL    MCH 32.0 26.0 - 35.0 pg    MCHC 31.0 (L) 32.0 - 34.5 %    RDW 13.4 11.5 - 15.0 fL    Platelets 342 242 - 412 E9/L    MPV 9.5 7.0 - 12.0 fL    Neutrophils % 63.6 43.0 - 80.0 %    Immature Granulocytes % 0.3 0.0 - 5.0 %    Lymphocytes % 18.2 (L) 20.0 - 42.0 %    Monocytes % 11.9 2.0 - 12.0 %    Eosinophils % 5.2 0.0 - 6.0 %    Basophils % 0.8 0.0 - 2.0 %    Neutrophils Absolute 4.01 1.80 - 7.30 E9/L    Immature Granulocytes # 0.02 E9/L    Lymphocytes Absolute 1.15 (L) 1.50 - 4.00 E9/L    Monocytes Absolute 0.75 0.10 - 0.95 E9/L    Eosinophils Absolute 0.33 0.05 - 0.50 E9/L    Basophils Absolute 0.05 0.00 - 0.20 C0/U   Basic Metabolic Panel w/ Reflex to MG   Result Value Ref Range    Sodium 138 132 - 146 mmol/L    Potassium reflex Magnesium 5.9 (H) 3.5 - 5.0 mmol/L    Chloride 106 98 - 107 mmol/L    CO2 22 22 - 29 mmol/L    Anion Gap 10 7 - 16 mmol/L    Glucose 95 74 - 99 mg/dL    BUN 81 (H) 8 - 23 mg/dL    CREATININE 4.8 (H) 0.5 - 1.0 mg/dL    GFR Non-African American 9 >=60 mL/min/1.73    GFR African American 11     Calcium 9.0 8.6 - 10.2 mg/dL   Urinalysis, reflex to microscopic   Result Value Ref Range    Color, UA Yellow Straw/Yellow    Clarity, UA SL CLOUDY Clear    Glucose, Ur Negative Negative mg/dL    Bilirubin Urine Negative Negative    Ketones, Urine Negative Negative mg/dL    Specific Gravity, UA 1.025 1.005 - 1.030    Blood, Urine TRACE-INTACT Negative    pH, UA 6.0 5.0 - 9.0    Protein,  (A) Negative mg/dL    Urobilinogen, Urine 0.2 <2.0 E.U./dL    Nitrite, Urine Negative Negative    Leukocyte Esterase, Urine MODERATE (A) Negative   Microscopic Urinalysis   Result Value Ref Range    WBC, UA >20 (A) 0 - 5 /HPF    RBC, UA 2-5 0 - 2 /HPF    Epithelial Cells, UA FEW /HPF    Bacteria, UA MANY (A) None Seen /HPF   Potassium   Result Value Ref Range    Potassium 5.0 3.5 - 5.0 mmol/L   POCT Glucose   Result Value Ref Range    Glucose 188 mg/dL    QC OK? yes    POCT Glucose   Result Value Ref Range    Glucose 141 mg/dL    QC OK? yes    POCT Glucose   Result Value Ref Range    Glucose 92 mg/dL    QC OK? yes    POCT Glucose   Result Value Ref Range    Meter Glucose 188 (H) 74 - 99 mg/dL   POCT Glucose   Result Value Ref Range    Meter Glucose 141 (H) 74 - 99 mg/dL   POCT Glucose   Result Value Ref Range    Meter Glucose 92 74 - 99 mg/dL   EKG 12 Lead   Result Value Ref Range    Ventricular Rate 60 BPM    Atrial Rate 60 BPM    P-R Interval 250 ms    QRS Duration 144 ms    Q-T Interval 530 ms    QTc Calculation (Bazett) 530 ms    P Axis 41 degrees    R Axis -33 degrees    T Axis 56 degrees     EKG read by me. Heart rate 60. Normal sinus rhythm. Left axis deviation. T waves peaked. QTc prolonged at 530. No ST elevations or depressions. RADIOLOGY:  No orders to display             ------------------------- NURSING NOTES AND VITALS REVIEWED ---------------------------  Date / Time Roomed:  11/17/2020 11:14 PM  ED Bed Assignment:  16/16    The nursing notes within the ED encounter and vital signs as below have been reviewed.      Patient Vitals for the past 24 hrs:   BP Temp Temp src Pulse Resp SpO2 Height Weight   11/18/20 0713 110/71 -- -- 60 16 100 % -- --   11/18/20 0640 (!) 95/58 -- -- 60 16 100 % -- --   11/18/20 0537 121/66 -- -- 61 16 99 % -- --   11/18/20 0430 (!) 119/55 -- -- 63 14 100 % -- --   11/18/20 0315 (!) 148/66 -- -- 66 14 98 % -- --   11/18/20 0215 (!) 157/69 -- -- 63 -- 100 % -- --   11/18/20 0115 (!) 154/61 -- -- 62 -- 98 % -- --   11/18/20 0054 -- -- -- 62 -- -- -- --   11/17/20 2327 (!) 181/74 98.2 °F (36.8 °C) Oral 60 16 99 % 5' 1\" (1.549 m) 205 lb 3.2 oz (93.1 kg)       Oxygen Saturation Interpretation: Normal    ------------------------------------------ PROGRESS NOTES ------------------------------------------  Re-evaluation(s):  0300 Patients symptoms are improving  Repeat physical examination is improved    Counseling:  I have spoken with the patient and discussed todays results, in addition to providing specific details for the plan of care and counseling regarding the diagnosis and prognosis. Their questions are answered at this time and they are agreeable with the plan of admission.    --------------------------------- ADDITIONAL PROVIDER NOTES ---------------------------------  Consultations:  Spoke with Dr. Medel Son  Discussed case. They will admit the patient. This patient's ED course included: a personal history and physicial examination, re-evaluation prior to disposition, multiple bedside re-evaluations, IV medications, cardiac monitoring and continuous pulse oximetry    This patient has remained hemodynamically stable during their ED course. Diagnosis:  1. Hyperkalemia    2. PERLA (acute kidney injury) ([facility] [address]        Disposition:  Patient's disposition: Admit to telemetry  Patient's condition is fair.          Sohail Garza MD  Resident  11/18/20 4800

## 2020-11-18 NOTE — ED NOTES
RN faxed SBAR to floor. Confirmation received and spoke with Leah.  Pt ready for transport to room when room is clean       eGrmaine Dominique RN  11/18/20 9385

## 2020-11-18 NOTE — ED NOTES
Bed: 16  Expected date:   Expected time:   Means of arrival:   Comments:  EMS abnormal labs     Yana Yuen RN  11/17/20 2821

## 2020-11-18 NOTE — ED NOTES
Dr. Adelina Oneal paged for admission orders. Dr. Adelina Oneal will put the orders in per unit secretary.      Sejal Ching RN  11/18/20 355 Mary Carmen Patel RN  11/18/20 5103

## 2020-11-18 NOTE — CONSULTS
Nephrology Consult Note  Patient's Name: Aziza Melendez  10:55 AM  2020    Nephrologist: Bam Shaikh    Reason for Consult:  CKD G5 with hyperkalemia  Requesting Physician:  Christella Siemens, MD    Chief Complaint:  Abnormal labs    History Obtained From:  patient and past medical records    History of Present Ilness:    Aziza Melendez is a 69 y.o. female with prior history CKD Stage G5 with baseline serum creatinine of 3.2 and e-GFR=14ml/min; Morbid obesity; History of diabetic foot ulcer; Previous . Sarcoidosis, Nephrolithiasis. Pt presented to the ED from the [facility] for abnormal labs. K+ 6.3 and the Cr 4.8mg/dl. In the ED the K+ 5.9 and the Cr 4.8mg/dl. She was treated with albuterol Ca++ IV and insulin. No C/O N/V/D/C, no CP or SOB. Pt states appetite has been good. Pt frustrated by the low level of intensity of PT at the ECF.  Discussed KRT with pt who is not yet ready to agree to start                                                                                                                                                                                                              Past Medical History:   Diagnosis Date    Anemia     Anxiety     Arthritis     knees    Chronic knee pain     Chronic pain syndrome     CKD (chronic kidney disease) stage 4, GFR 15-29 ml/min (HCC)     Depression     Diabetes mellitus (HCC)     type 2    Dysphagia, oral phase 2020    Gall stones     Generalized muscle weakness     Hypertension     Hypertensive kidney disease with stage 4 chronic kidney disease (HCC)     Kidney disease     Kidney stones     Obesity     Protein calorie malnutrition (HCC)     Sacral pressure ulcer 2020    Sarcoidosis     SOBOE (shortness of breath on exertion)     Tremor     Unspecified osteoarthritis, unspecified site     Urinary anomaly leakage,urinate>1/night    UTI (urinary tract infection)        Past Surgical History:   Procedure Laterality Date    ABDOMEN SURGERY N/A 5/4/2020    SACRAL WOUND DEBRIDEMENT CALL  WITH TIME AVAIL AM performed by Alicia Grimes MD at [address]  x3   [facility]  3/31/16    Laparoscopic-Dr. Yuki Navarro    CYSTOSCOPY  2011     for kidney stones    FOOT SURGERY  2009     right     UPPER GASTROINTESTINAL ENDOSCOPY  2.18.15    Dr. Alvarez Plan Findings: Mild Gastrits and Duodenitis, 2cm Hiatal Hernia    UPPER GASTROINTESTINAL ENDOSCOPY N/A 5/8/2020    EGD CONTROL HEMORRHAGE performed by Alicia Grimes MD at [address] N/A 5/22/2020    EGD BEDSIDE performed by Flores Champagne MD at [facility]       Family History   Problem Relation Age of Onset    Cancer Sister         reports that she quit smoking about 9 years ago. She has a 30.00 pack-year smoking history. She has never used smokeless tobacco. She reports that she does not drink alcohol or use drugs. Allergies:  Patient has no known allergies. Current Medications:    cefTRIAXone (ROCEPHIN) 1 g in sterile water 10 mL IV syringe, Once  glucose (GLUTOSE) 40 % oral gel 15 g, PRN  dextrose 50 % IV solution, PRN  glucagon (rDNA) injection 1 mg, PRN  dextrose 5 % solution, PRN  escitalopram (LEXAPRO) tablet 10 mg, Daily  ferrous sulfate (IRON 325) tablet 325 mg, Daily with breakfast  gabapentin (NEURONTIN) capsule 300 mg, BID  insulin lispro (HUMALOG) injection vial 0-10 Units, TID AC  metoprolol succinate (TOPROL XL) extended release tablet 25 mg, Daily  therapeutic multivitamin-minerals 1 tablet, Daily  vitamin B-1 (THIAMINE) tablet 200 mg, Daily  ascorbic acid (VITAMIN C) tablet 500 mg, Daily  zinc sulfate (ZINCATE) capsule 50 mg, Daily  alogliptin (NESINA) tablet 6.25 mg, Daily        Review of Systems:   Pertinent items are noted in HPI. Remainder of a complete review of systems is (-) other than stated in the HPI    Physical exam:   Constitutional:  Elderly female in NAD  Vitals: VITALS:  /63 Pulse 64   Temp 98.2 °F (36.8 °C) (Oral)   Resp 16   Ht 5' 1\" (1.549 m)   Wt 205 lb 3.2 oz (93.1 kg)   SpO2 99%   BMI 38.77 kg/m²   24HR INTAKE/OUTPUT:  No intake or output data in the 24 hours ending 11/18/20 1056  URINARY CATHETER OUTPUT (Castel[place]):     DRAIN/TUBE OUTPUT:     VENT SETTINGS:  Vent Information  SpO2: 99 %  Additional Respiratory  Assessments  Pulse: 64  Resp: 16  SpO2: 99 %    Skin: no rash, turgor wnl  Heent:  eomi, mmm  Neck: no bruits or jvd noted  Cardiovascular:PMI lat displaced   S1, S2 without S3 or a rub  Respiratory: CTA B without w/r/r  Abdomen:  +bs, soft, nt, nd  Ext: (-) bilat  lower extremity edema  Psychiatric: mood and affect appropriate, cr nr 2-12 grossly intact  Musculoskeletal:  Rom, muscular strength intact    Data:   Labs:  CBC:   Lab Results   Component Value Date    WBC 6.3 11/17/2020    RBC 3.19 11/17/2020    HGB 10.2 11/17/2020    HCT 32.9 11/17/2020    .1 11/17/2020    MCH 32.0 11/17/2020    MCHC 31.0 11/17/2020    RDW 13.4 11/17/2020     11/17/2020    MPV 9.5 11/17/2020     CBC with Differential:    Lab Results   Component Value Date    WBC 6.3 11/17/2020    RBC 3.19 11/17/2020    HGB 10.2 11/17/2020    HCT 32.9 11/17/2020     11/17/2020    .1 11/17/2020    MCH 32.0 11/17/2020    MCHC 31.0 11/17/2020    RDW 13.4 11/17/2020    NRBC 0.9 05/25/2020    SEGSPCT 71 08/16/2013    BANDSPCT 1 03/29/2016    METASPCT 0.9 05/10/2020    LYMPHOPCT 18.2 11/17/2020    PROMYELOPCT 0.9 05/09/2020    MONOPCT 11.9 11/17/2020    MYELOPCT 1.7 05/11/2020    BASOPCT 0.8 11/17/2020    MONOSABS 0.75 11/17/2020    LYMPHSABS 1.15 11/17/2020    EOSABS 0.33 11/17/2020    BASOSABS 0.05 11/17/2020     Hemoglobin/Hematocrit:    Lab Results   Component Value Date    HGB 10.2 11/17/2020    HCT 32.9 11/17/2020     CMP:    Lab Results   Component Value Date     11/17/2020    K 5.0 11/18/2020    K 5.9 11/17/2020     11/17/2020    CO2 22 11/17/2020    BUN 81 11/17/2020    CREATININE 4.8 11/17/2020    GFRAA 11 11/17/2020    LABGLOM 9 11/17/2020    GLUCOSE 91 11/18/2020    GLUCOSE 149 10/12/2011    PROT 5.6 10/12/2020    LABALBU 2.2 10/12/2020    LABALBU 4.1 10/12/2011    CALCIUM 9.0 11/17/2020    BILITOT <0.2 10/12/2020    ALKPHOS 75 10/12/2020    AST 27 10/12/2020    ALT 26 10/12/2020     BMP:    Lab Results   Component Value Date     11/17/2020    K 5.0 11/18/2020    K 5.9 11/17/2020     11/17/2020    CO2 22 11/17/2020    BUN 81 11/17/2020    LABALBU 2.2 10/12/2020    LABALBU 4.1 10/12/2011    CREATININE 4.8 11/17/2020    CALCIUM 9.0 11/17/2020    GFRAA 11 11/17/2020    LABGLOM 9 11/17/2020    GLUCOSE 91 11/18/2020    GLUCOSE 149 10/12/2011     BUN/Creatinine:    Lab Results   Component Value Date    BUN 81 11/17/2020    CREATININE 4.8 11/17/2020     Hepatic Function Panel:    Lab Results   Component Value Date    ALKPHOS 75 10/12/2020    ALT 26 10/12/2020    AST 27 10/12/2020    PROT 5.6 10/12/2020    BILITOT <0.2 10/12/2020    BILIDIR 0.8 06/25/2011    LABALBU 2.2 10/12/2020    LABALBU 4.1 10/12/2011     Albumin:    Lab Results   Component Value Date    LABALBU 2.2 10/12/2020    LABALBU 4.1 10/12/2011     Calcium:    Lab Results   Component Value Date    CALCIUM 9.0 11/17/2020     Ionized Calcium:    Lab Results   Component Value Date    IONCA 1.59 06/26/2013     Magnesium:    Lab Results   Component Value Date    MG 2.1 10/12/2020     Phosphorus:    Lab Results   Component Value Date    PHOS 4.4 10/12/2020     LDH:    Lab Results   Component Value Date     05/24/2020     Uric Acid:    Lab Results   Component Value Date    LABURIC 7.7 07/19/2019    URICACID 11.6 10/12/2011     PT/INR:    Lab Results   Component Value Date    PROTIME 11.3 05/27/2020    PROTIME 15.3 06/25/2011    INR 1.0 05/27/2020     PTT:    Lab Results   Component Value Date    APTT 24.6 05/21/2020   [APTT}  Troponin:    Lab Results   Component Value Date    TROPONINI 0.09

## 2020-11-19 VITALS
HEART RATE: 64 BPM | TEMPERATURE: 98.1 F | BODY MASS INDEX: 38.19 KG/M2 | RESPIRATION RATE: 14 BRPM | HEIGHT: 61 IN | WEIGHT: 202.3 LBS | SYSTOLIC BLOOD PRESSURE: 130 MMHG | OXYGEN SATURATION: 98 % | DIASTOLIC BLOOD PRESSURE: 54 MMHG

## 2020-11-19 LAB
ALBUMIN SERPL-MCNC: 2.7 G/DL (ref 3.5–5.2)
ALP BLD-CCNC: 69 U/L (ref 35–104)
ALT SERPL-CCNC: 11 U/L (ref 0–32)
ANION GAP SERPL CALCULATED.3IONS-SCNC: 10 MMOL/L (ref 7–16)
AST SERPL-CCNC: 17 U/L (ref 0–31)
BASOPHILS ABSOLUTE: 0.04 E9/L (ref 0–0.2)
BASOPHILS RELATIVE PERCENT: 0.6 % (ref 0–2)
BILIRUB SERPL-MCNC: <0.2 MG/DL (ref 0–1.2)
BUN BLDV-MCNC: 79 MG/DL (ref 8–23)
CALCIUM IONIZED: 1.3 MMOL/L (ref 1.15–1.33)
CALCIUM SERPL-MCNC: 9 MG/DL (ref 8.6–10.2)
CHLORIDE BLD-SCNC: 108 MMOL/L (ref 98–107)
CO2: 23 MMOL/L (ref 22–29)
CREAT SERPL-MCNC: 4.8 MG/DL (ref 0.5–1)
EOSINOPHILS ABSOLUTE: 0.31 E9/L (ref 0.05–0.5)
EOSINOPHILS RELATIVE PERCENT: 4.6 % (ref 0–6)
FERRITIN: 273 NG/ML
FOLATE: 13.9 NG/ML (ref 4.8–24.2)
GFR AFRICAN AMERICAN: 11
GFR NON-AFRICAN AMERICAN: 9 ML/MIN/1.73
GLUCOSE BLD-MCNC: 77 MG/DL (ref 74–99)
HCT VFR BLD CALC: 31.4 % (ref 34–48)
HEMOGLOBIN: 9.6 G/DL (ref 11.5–15.5)
IMMATURE GRANULOCYTES #: 0.02 E9/L
IMMATURE GRANULOCYTES %: 0.3 % (ref 0–5)
IRON SATURATION: 25 % (ref 15–50)
IRON: 53 MCG/DL (ref 37–145)
LYMPHOCYTES ABSOLUTE: 0.8 E9/L (ref 1.5–4)
LYMPHOCYTES RELATIVE PERCENT: 11.8 % (ref 20–42)
MAGNESIUM: 3 MG/DL (ref 1.6–2.6)
MCH RBC QN AUTO: 31.6 PG (ref 26–35)
MCHC RBC AUTO-ENTMCNC: 30.6 % (ref 32–34.5)
MCV RBC AUTO: 103.3 FL (ref 80–99.9)
METER GLUCOSE: 160 MG/DL (ref 74–99)
METER GLUCOSE: 92 MG/DL (ref 74–99)
MONOCYTES ABSOLUTE: 0.77 E9/L (ref 0.1–0.95)
MONOCYTES RELATIVE PERCENT: 11.4 % (ref 2–12)
NEUTROPHILS ABSOLUTE: 4.82 E9/L (ref 1.8–7.3)
NEUTROPHILS RELATIVE PERCENT: 71.3 % (ref 43–80)
PARATHYROID HORMONE INTACT: 61 PG/ML (ref 15–65)
PDW BLD-RTO: 13.5 FL (ref 11.5–15)
PHOSPHORUS: 7.1 MG/DL (ref 2.5–4.5)
PLATELET # BLD: 271 E9/L (ref 130–450)
PMV BLD AUTO: 10 FL (ref 7–12)
POTASSIUM REFLEX MAGNESIUM: 5.3 MMOL/L (ref 3.5–5)
RBC # BLD: 3.04 E12/L (ref 3.5–5.5)
SODIUM BLD-SCNC: 141 MMOL/L (ref 132–146)
TOTAL IRON BINDING CAPACITY: 213 MCG/DL (ref 250–450)
TOTAL PROTEIN: 6.5 G/DL (ref 6.4–8.3)
VITAMIN B-12: 836 PG/ML (ref 211–946)
VITAMIN D 25-HYDROXY: 13 NG/ML (ref 30–100)
WBC # BLD: 6.8 E9/L (ref 4.5–11.5)

## 2020-11-19 PROCEDURE — 80053 COMPREHEN METABOLIC PANEL: CPT

## 2020-11-19 PROCEDURE — 83970 ASSAY OF PARATHORMONE: CPT

## 2020-11-19 PROCEDURE — 84100 ASSAY OF PHOSPHORUS: CPT

## 2020-11-19 PROCEDURE — 2580000003 HC RX 258: Performed by: FAMILY MEDICINE

## 2020-11-19 PROCEDURE — 82330 ASSAY OF CALCIUM: CPT

## 2020-11-19 PROCEDURE — 6370000000 HC RX 637 (ALT 250 FOR IP): Performed by: FAMILY MEDICINE

## 2020-11-19 PROCEDURE — 82746 ASSAY OF FOLIC ACID SERUM: CPT

## 2020-11-19 PROCEDURE — 6370000000 HC RX 637 (ALT 250 FOR IP): Performed by: INTERNAL MEDICINE

## 2020-11-19 PROCEDURE — 82306 VITAMIN D 25 HYDROXY: CPT

## 2020-11-19 PROCEDURE — 82607 VITAMIN B-12: CPT

## 2020-11-19 PROCEDURE — 82962 GLUCOSE BLOOD TEST: CPT

## 2020-11-19 PROCEDURE — 36415 COLL VENOUS BLD VENIPUNCTURE: CPT

## 2020-11-19 PROCEDURE — 85025 COMPLETE CBC W/AUTO DIFF WBC: CPT

## 2020-11-19 PROCEDURE — 83550 IRON BINDING TEST: CPT

## 2020-11-19 PROCEDURE — G0378 HOSPITAL OBSERVATION PER HR: HCPCS

## 2020-11-19 PROCEDURE — 96372 THER/PROPH/DIAG INJ SC/IM: CPT

## 2020-11-19 PROCEDURE — 6360000002 HC RX W HCPCS: Performed by: FAMILY MEDICINE

## 2020-11-19 PROCEDURE — 82728 ASSAY OF FERRITIN: CPT

## 2020-11-19 PROCEDURE — 83540 ASSAY OF IRON: CPT

## 2020-11-19 PROCEDURE — 83735 ASSAY OF MAGNESIUM: CPT

## 2020-11-19 RX ORDER — SEVELAMER CARBONATE 800 MG/1
800 TABLET, FILM COATED ORAL
Qty: 90 TABLET | Refills: 3 | DISCHARGE
Start: 2020-11-19 | End: 2021-08-03

## 2020-11-19 RX ORDER — VITAMIN B COMPLEX
2000 TABLET ORAL DAILY
Status: DISCONTINUED | OUTPATIENT
Start: 2020-11-19 | End: 2020-11-19 | Stop reason: HOSPADM

## 2020-11-19 RX ORDER — SEVELAMER CARBONATE 800 MG/1
800 TABLET, FILM COATED ORAL
Status: DISCONTINUED | OUTPATIENT
Start: 2020-11-19 | End: 2020-11-19 | Stop reason: HOSPADM

## 2020-11-19 RX ORDER — CHOLECALCIFEROL (VITAMIN D3) 50 MCG
2000 TABLET ORAL DAILY
Qty: 60 TABLET | DISCHARGE
Start: 2020-11-20

## 2020-11-19 RX ADMIN — BUSPIRONE HYDROCHLORIDE 5 MG: 5 TABLET ORAL at 18:24

## 2020-11-19 RX ADMIN — METOPROLOL SUCCINATE 25 MG: 25 TABLET, EXTENDED RELEASE ORAL at 09:10

## 2020-11-19 RX ADMIN — SENNOSIDES 8.6 MG: 8.6 TABLET, FILM COATED ORAL at 09:10

## 2020-11-19 RX ADMIN — FERROUS SULFATE TAB 325 MG (65 MG ELEMENTAL FE) 325 MG: 325 (65 FE) TAB at 09:10

## 2020-11-19 RX ADMIN — Medication 200 MG: at 09:10

## 2020-11-19 RX ADMIN — GABAPENTIN 300 MG: 300 CAPSULE ORAL at 09:10

## 2020-11-19 RX ADMIN — ZINC SULFATE 220 MG (50 MG) CAPSULE 50 MG: CAPSULE at 09:10

## 2020-11-19 RX ADMIN — MULTIPLE VITAMINS W/ MINERALS TAB 1 TABLET: TAB at 09:11

## 2020-11-19 RX ADMIN — SODIUM ZIRCONIUM CYCLOSILICATE 5 G: 5 POWDER, FOR SUSPENSION ORAL at 09:10

## 2020-11-19 RX ADMIN — Medication 500 MG: at 09:10

## 2020-11-19 RX ADMIN — HEPARIN SODIUM 5000 UNITS: 10000 INJECTION INTRAVENOUS; SUBCUTANEOUS at 06:17

## 2020-11-19 RX ADMIN — ACETAMINOPHEN 650 MG: 325 TABLET ORAL at 03:18

## 2020-11-19 RX ADMIN — ESCITALOPRAM OXALATE 10 MG: 10 TABLET ORAL at 09:10

## 2020-11-19 RX ADMIN — ALOGLIPTIN 6.25 MG: 6.25 TABLET, FILM COATED ORAL at 09:10

## 2020-11-19 RX ADMIN — Medication 2000 UNITS: at 11:26

## 2020-11-19 RX ADMIN — DOCUSATE SODIUM 100 MG: 100 CAPSULE, LIQUID FILLED ORAL at 09:10

## 2020-11-19 RX ADMIN — SODIUM CHLORIDE, PRESERVATIVE FREE 10 ML: 5 INJECTION INTRAVENOUS at 09:10

## 2020-11-19 RX ADMIN — SEVELAMER CARBONATE 800 MG: 800 TABLET, FILM COATED ORAL at 18:24

## 2020-11-19 RX ADMIN — ACETAMINOPHEN 650 MG: 325 TABLET ORAL at 18:24

## 2020-11-19 RX ADMIN — BUSPIRONE HYDROCHLORIDE 5 MG: 5 TABLET ORAL at 09:11

## 2020-11-19 RX ADMIN — INSULIN LISPRO 2 UNITS: 100 INJECTION, SOLUTION INTRAVENOUS; SUBCUTANEOUS at 11:28

## 2020-11-19 RX ADMIN — POLYETHYLENE GLYCOL 3350 17 G: 17 POWDER, FOR SOLUTION ORAL at 09:10

## 2020-11-19 ASSESSMENT — PAIN SCALES - GENERAL
PAINLEVEL_OUTOF10: 0
PAINLEVEL_OUTOF10: 4
PAINLEVEL_OUTOF10: 0
PAINLEVEL_OUTOF10: 2

## 2020-11-19 NOTE — CARE COORDINATION
11/19/2020  Social Work Discharge Planning: Sw set up ambulance transport via [employer] for Pt to return to [facility] today at [place]. Nurse here, Di Vincent at [facility] and son Ermelinda Epley were notified. Electronically signed by SHANTA Lee on 11/19/2020 at 10:13 AM

## 2020-11-19 NOTE — H&P
osteoarthritis of both  knees. PHYSICAL EXAMINATION:  GENERAL:  Seen in the ER, she is lying in the gurney. She is in no  acute distress. VITAL SIGNS:  Temperature 98.2, pulse 60, respirations 16, pressure  110/71, O2 sats 100% on room air. SKIN:  Shows no pallor, no jaundice. Good turgor. HEENT:  Eyes:  Reveal no icterus. NECK:  Supple without bruits or mass. CHEST:  Clear to auscultation. HEART:  Regular rate and rhythm without murmur, gallop, or rub. ABDOMEN:  Obese, soft, nontender at this point. EXTREMITIES:  Reveal minimal edema bilaterally. NEUROLOGICAL:  She is awake and alert x3. There is no focal  neurological deficits at this point. LABORATORY DATA:  Initial laboratories showed a potassium of 5.9, BUN of  81, creatinine of 4.8. White count 6.3, hemoglobin 10.2, platelet count  421,506. Urinalysis basically unremarkable. DIAGNOSTIC IMPRESSION:  Hyperkalemia, acute-on-chronic kidney disease. At this point, potassium has been treated. Nephrology has seen her. Question is whether she will need hemodialysis or not. She is refusing  at present. PLAN:  Observe overnight. Recheck labs in the morning. Potential  discharge back to skilled nursing facility in the a..         Travon Ghosh MD    D: 11/18/2020 15:51:15       T: 11/18/2020 15:58:09     [email or web address]  Job#: [id number]     Doc#: [id number]    CC:

## 2020-11-19 NOTE — DISCHARGE INSTR - COC
Continuity of Care Form    Patient Name: Aidan Kramer   :  1950  MRN:  [id number]    Admit date:  2020  Discharge date:  2020    Code Status Order: DNR-CCA   Advance Directives:   [facility] Documentation       Date/Time Healthcare Directive Type of Healthcare Directive Copy in [address] Agent's Name Healthcare Agent's Phone Number    [phone]  Yes, patient has an advance directive for healthcare treatment  --  --  --  --  --            Admitting Physician:  Truong Nelson MD  PCP: Truong Nelson MD    Discharging Nurse: [address] [place] [address] Drive, [address] Unit/Room#: 7239/9373-F  Discharging Unit Phone Number:     Emergency Contact:   Extended Emergency Contact Information  Primary Emergency Contact: Johnson Mcguire   [address] Phone: [phone]  Relation: Child  Secondary Emergency Contact: Siva Mcguireie   [address] Phone: [phone]  Relation: Child    Past Surgical History:  Past Surgical History:   Procedure Laterality Date    ABDOMEN SURGERY N/A 2020    [address] WITH TIME AVAIL AM performed by Marcello Ibrahim MD at [address] [place] [address]  x3   [facility]  3/31/16    Laparoscopic-Dr. Simone Morrell    CYSTOSCOPY       for kidney stones    FOOT SURGERY       right     UPPER GASTROINTESTINAL ENDOSCOPY  2.18.15    [facility] Findings: Mild Gastrits and Duodenitis, 2cm Hiatal Hernia    UPPER GASTROINTESTINAL ENDOSCOPY N/A 2020    EGD CONTROL HEMORRHAGE performed by Marcello Ibrahim MD at [address] 2020    EGD BEDSIDE performed by Bianca De Dios MD at [facility]       Immunization History: There is no immunization history for the selected administration types on file for this patient.     Active Problems:  Patient Active Problem List   Diagnosis Code    Neurologic gait dysfunction R26.9  Renal failure, acute on chronic (HCC) N17.9, N18.9    Diabetes mellitus ([facility] E11.9    Hypertension I10    Arthritis M19.90    Kidney disease N28.9    Acute blood loss anemia D62    Knee problem M25.9    Anemia due to stage 3 chronic kidney disease N18.30, D63.1    Primary osteoarthritis of both knees M17.0    Acute on chronic renal insufficiency N28.9, N18.9    Acute renal failure (HCC) S85.8    Metabolic acidosis D51.7    Acute renal failure superimposed on chronic kidney disease, on chronic dialysis ([facility] N17.9, N18.9, Z99.2    Sepsis ([facility] A41.9    2019 novel coronavirus disease (COVID-19) U07.1    GI bleed K92.2    Moderate protein-calorie malnutrition (HCC) E44.0    Hyperkalemia E87.5    PERLA (acute kidney injury) ([facility] N17.9       Isolation/Infection:   Isolation            No Isolation          Patient Infection Status       Infection Onset Added Last Indicated Last Indicated By Review Planned Expiration Resolved Resolved By    None active    Resolved    COVID-19 Rule Out 10/09/20 10/09/20 10/09/20 COVID-19 (Ordered)   10/09/20 Rule-Out Test Resulted    COVID-19 Rule Out 20 COVID-19 (Ordered)   20     VRE 20 Culture, Wound   10/12/20 Fernando Campo RN    Enterococcus faecium Wound-Coccyx 20    MDRO (multi-drug resistant organism) 20 Culture, Wound   10/12/20 Fernando Campo RN    COVID-19 Rule Out 20 COVID-19 (Ordered)   20 Rule-Out Test Resulted    DETECTED 2020    COVID-19 Rule Out 05/14/20 05/15/20 05/14/20 Covid-19 Ambulatory (Ordered)   20 Rule-Out Test Resulted    Detected 2020    COVID-19 Rule Out 20/20 COVID-19 (Ordered)   20 Rule-Out Test Resulted    DETECTED 2020    C-diff Rule Out 20 CLOSTRIDIUM DIFFICILE EIA (Ordered)   20 Rose Nettles RN    Does not meet criteria    MRSA hours) at 11/19/2020 0833  Last data filed at 11/19/2020 0533  Gross per 24 hour   Intake 360 ml   Output 600 ml   Net -240 ml     I/O last 3 completed shifts: In: 360 [P.O.:360]  Out: 600 [Urine:600]    Safety Concerns: At Risk for Falls    Impairments/Disabilities:      None    Nutrition Therapy:  Current Nutrition Therapy:   - Oral Diet:  General    Routes of Feeding: Oral  Liquids: Thin Liquids  Daily Fluid Restriction: no  Last Modified Barium Swallow with Video (Video Swallowing Test): not done    Treatments at the Time of Hospital Discharge:   Respiratory Treatments: n/a  Oxygen Therapy:  is not on home oxygen therapy.   Ventilator:    - No ventilator support    Rehab Therapies: Physical Therapy and Occupational Therapy  Weight Bearing Status/Restrictions: No weight bearing restirctions  Other Medical Equipment (for information only, NOT a DME order):  walker  Other Treatments: wound care to sacral wound as previous     Patient's personal belongings (please select all that are sent with patient):  None    RN SIGNATURE:  Electronically signed by Lex Breen RN on 11/19/20 at 2:32 PM EST    CASE MANAGEMENT/SOCIAL WORK SECTION    Inpatient Status Date: ***    Readmission Risk Assessment Score:  Readmission Risk              Risk of Unplanned Readmission:        41           Discharging to Facility/ Agency   · Name:   · Address:  · Phone:  · Fax:    Dialysis Facility (if applicable)   · Name:  · Address:  · Dialysis Schedule:  · Phone:  · Fax:    / signature: {Esignature:[id number]:::0}    PHYSICIAN SECTION    Prognosis: Good    Condition at Discharge: Stable    Rehab Potential (if transferring to Rehab): Good    Recommended Labs or Other Treatments After Discharge: cbc cmp    Physician Certification: I certify the above information and transfer of Violetta Adames  is necessary for the continuing treatment of the diagnosis listed and that she requires Lupillo simon less 30 days.      Update Admission H&P: No change in H&P    PHYSICIAN SIGNATURE:  Electronically signed by Romero Paige MD on 11/19/20 at 8:33 AM EST

## 2020-11-19 NOTE — PROGRESS NOTES
Nurse to Nurse called to [address] , patient aware of transfer, [address] faxed to  [phone]

## 2020-11-19 NOTE — CONSULTS
11/19/2020  10:49 AM      Nutrition Education    · Education materials on Low K+ diet included in Discharge Instructions  · Written educational materials provided. · Contact name and number provided. SUMMARY:  Consult for teaching on Low K+ diet. Pt is to return to [facility] today. Included written info in Discharge instructions, as diet will be moderated/provided by facility after discharge. Noted that she is currently on a Renal diet (and Renvela) for CKD IV and does not want to start KRT.  Dietitian contact info included      Electronically signed by Jannette Alvares RD, CNSC, LD on 11/19/20 at 10:50 AM EST    Contact: [phone]

## 2020-11-19 NOTE — PROGRESS NOTES
Subjective: The patient is awake and alert. No problems overnight. Denies chest pain, angina, and dyspnea. Denies abdominal pain. Tolerating diet. No nausea or vomiting. Objective:    BP (!) 149/63   Pulse 67   Temp 97.9 °F (36.6 °C) (Oral)   Resp 16   Ht 5' 1\" (1.549 m)   Wt 202 lb 4.8 oz (91.8 kg)   SpO2 99%   BMI 38.22 kg/m²     Heart:  RRR, no murmurs, gallops, or rubs.   Lungs:  CTA bilaterally, no wheeze, rales or rhonchi  Abd: bowel sounds present, nontender, nondistended, no masses  Extrem:  No clubbing, cyanosis, or edema    CBC with Differential:    Lab Results   Component Value Date    WBC 6.8 11/19/2020    RBC 3.04 11/19/2020    HGB 9.6 11/19/2020    HCT 31.4 11/19/2020     11/19/2020    .3 11/19/2020    MCH 31.6 11/19/2020    MCHC 30.6 11/19/2020    RDW 13.5 11/19/2020    NRBC 0.9 05/25/2020    SEGSPCT 71 08/16/2013    BANDSPCT 1 03/29/2016    METASPCT 0.9 05/10/2020    LYMPHOPCT 11.8 11/19/2020    PROMYELOPCT 0.9 05/09/2020    MONOPCT 11.4 11/19/2020    MYELOPCT 1.7 05/11/2020    BASOPCT 0.6 11/19/2020    MONOSABS 0.77 11/19/2020    LYMPHSABS 0.80 11/19/2020    EOSABS 0.31 11/19/2020    BASOSABS 0.04 11/19/2020     CMP:    Lab Results   Component Value Date     11/19/2020    K 5.3 11/19/2020    K 5.9 11/17/2020     11/19/2020    CO2 23 11/19/2020    BUN 79 11/19/2020    CREATININE 4.8 11/19/2020    GFRAA 11 11/19/2020    LABGLOM 9 11/19/2020    GLUCOSE 77 11/19/2020    GLUCOSE 149 10/12/2011    PROT 6.5 11/19/2020    LABALBU 2.7 11/19/2020    LABALBU 4.1 10/12/2011    CALCIUM 9.0 11/19/2020    BILITOT <0.2 11/19/2020    ALKPHOS 69 11/19/2020    AST 17 11/19/2020    ALT 11 11/19/2020        Assessment:    Patient Active Problem List   Diagnosis    Neurologic gait dysfunction    Renal failure, acute on chronic ([facility]    Diabetes mellitus ([facility]    Hypertension    Arthritis    Kidney disease    Acute blood loss anemia    Knee problem    Anemia due to stage 3 chronic kidney disease    Primary osteoarthritis of both knees    Acute on chronic renal insufficiency    Acute renal failure (HCC)    Metabolic acidosis    Acute renal failure superimposed on chronic kidney disease, on chronic dialysis ([facility]    Sepsis ([facility]    2019 novel coronavirus disease (COVID-19)    GI bleed    Moderate protein-calorie malnutrition (HCC)    Hyperkalemia    PERLA (acute kidney injury) [facility])       Plan:  Discharge        Corinna Pierre  8:32 AM  11/19/2020

## 2020-11-19 NOTE — PROGRESS NOTES
Nephrology Progress Note  Patient's Name: Brandie Conti  1:19 PM  2020    Nephrologist: Marylu Fernandez    Reason for Consult:  CKD G5 with hyperkalemia  Requesting Physician:  Peggy Dykes MD    Chief Complaint:  Abnormal labs    History Obtained From:  patient and past medical records    History of Present Ilness from the 20 note:    Brandie Conti is a 69 y.o. female with prior history CKD Stage G5 with baseline serum creatinine of 3.2 and e-GFR=14ml/min; Morbid obesity; History of diabetic foot ulcer; Previous . Sarcoidosis, Nephrolithiasis. Pt presented to the ED from the [facility] for abnormal labs. K+ 6.3 and the Cr 4.8mg/dl. In the ED the K+ 5.9 and the Cr 4.8mg/dl. She was treated with albuterol Ca++ IV and insulin. No C/O N/V/D/C, no CP or SOB. Pt states appetite has been good. Pt frustrated by the low level of intensity of PT at the ECF.  Discussed KRT with pt who is not yet ready to agree to start                                                                                                                                                                                                          20: pt awake alert and states she feels good, she only notes she does not like animal protein as it tastes off to her    Past Medical History:   Diagnosis Date    Anemia     Anxiety     Arthritis     knees    Chronic knee pain     Chronic pain syndrome     CKD (chronic kidney disease) stage 4, GFR 15-29 ml/min ([facility])     Depression     Diabetes mellitus ([address]     type 2    Dysphagia, oral phase 2020    Gall stones     Generalized muscle weakness     Hypertension     Hypertensive kidney disease with stage 4 chronic kidney disease ([address]     Kidney disease     Kidney stones     Obesity     Protein calorie malnutrition ([address]     Sacral pressure ulcer 2020    Sarcoidosis     SOBOE (shortness of breath on exertion)     Tremor     Unspecified osteoarthritis, unspecified site     Urinary anomaly leakage,urinate>1/night    UTI (urinary tract infection)        Past Surgical History:   Procedure Laterality Date    ABDOMEN SURGERY N/A 5/4/2020    SACRAL WOUND DEBRIDEMENT CALL  WITH TIME AVAIL AM performed by Fartun Kilpatrick MD at [address]  x3   [facility]  3/31/16    Laparoscopic-Dr. Ramírez Roger    CYSTOSCOPY  2011     for kidney stones    FOOT SURGERY  2009     right     UPPER GASTROINTESTINAL ENDOSCOPY  2.18.15    Dr. Caro Kate Findings: Mild Gastrits and Duodenitis, 2cm Hiatal Hernia    UPPER GASTROINTESTINAL ENDOSCOPY N/A 5/8/2020    EGD CONTROL HEMORRHAGE performed by Fartun Kilpatrick MD at [facility] N/A 5/22/2020    EGD BEDSIDE performed by Tyesha Robert MD at [facility]       Family History   Problem Relation Age of Onset    Cancer Sister         reports that she quit smoking about 9 years ago. She has a 30.00 pack-year smoking history. She has never used smokeless tobacco. She reports that she does not drink alcohol or use drugs. Allergies:  Patient has no known allergies.     Current Medications:    sevelamer (RENVELA) tablet 800 mg, TID WC  Vitamin D (CHOLECALCIFEROL) tablet 2,000 Units, Daily  cefTRIAXone (ROCEPHIN) 1 g in sterile water 10 mL IV syringe, Once  glucose (GLUTOSE) 40 % oral gel 15 g, PRN  dextrose 50 % IV solution, PRN  glucagon (rDNA) injection 1 mg, PRN  dextrose 5 % solution, PRN  busPIRone (BUSPAR) tablet 5 mg, TID  docusate sodium (COLACE) capsule 100 mg, BID  escitalopram (LEXAPRO) tablet 10 mg, Daily  ferrous sulfate (IRON 325) tablet 325 mg, Daily with breakfast  gabapentin (NEURONTIN) capsule 300 mg, BID  insulin glargine (LANTUS) injection vial 10 Units, Nightly  insulin lispro (HUMALOG) injection vial 0-10 Units, TID AC  magnesium hydroxide (MILK OF MAGNESIA) 400 MG/5ML suspension 30 mL, BID PRN  melatonin tablet 3 mg, Nightly PRN  metoprolol succinate (TOPROL XL) extended release tablet 25 mg, Daily  therapeutic multivitamin-minerals 1 tablet, Daily  oxyCODONE-acetaminophen (PERCOCET) 5-325 MG per tablet 1 tablet, Q8H PRN  polyethylene glycol (GLYCOLAX) packet 17 g, Daily  senna (SENOKOT) tablet 8.6 mg, BID  vitamin B-1 (THIAMINE) tablet 200 mg, Daily  ascorbic acid (VITAMIN C) tablet 500 mg, Daily  zinc sulfate (ZINCATE) capsule 50 mg, Daily  sodium chloride flush 0.9 % injection 10 mL, 2 times per day  sodium chloride flush 0.9 % injection 10 mL, PRN  acetaminophen (TYLENOL) tablet 650 mg, Q6H PRN    Or  acetaminophen (TYLENOL) suppository 650 mg, Q6H PRN  heparin (porcine) injection 5,000 Units, 3 times per day  alogliptin (NESINA) tablet 6.25 mg, Daily  sodium zirconium cyclosilicate (LOKELMA) oral suspension 5 g, Daily        Review of Systems:   Pertinent items are noted in HPI. Remainder of a complete review of systems is (-) other than stated in the HPI    Physical exam:   Constitutional:  Elderly female in NAD  Vitals: VITALS:  BP (!) 130/54   Pulse 64   Temp 98.1 °F (36.7 °C) (Oral)   Resp 14   Ht 5' 1\" (1.549 m)   Wt 202 lb 4.8 oz (91.8 kg)   SpO2 98%   BMI 38.22 kg/m²   24HR INTAKE/OUTPUT:      Intake/Output Summary (Last 24 hours) at 11/19/2020 1319  Last data filed at 11/19/2020 1254  Gross per 24 hour   Intake 840 ml   Output 600 ml   Net 240 ml     URINARY CATHETER OUTPUT (Castelan):     DRAIN/TUBE OUTPUT:     VENT SETTINGS:  Vent Information  SpO2: 98 %  Additional Respiratory  Assessments  Pulse: 64  Resp: 14  SpO2: 98 %    Skin: no rash, turgor wnl  Heent:  eomi, mmm  Neck: no bruits or jvd noted  Cardiovascular:PMI lat displaced   S1, S2 without S3 or a rub  Respiratory: CTA B without w/r/r  Abdomen:  +bs, soft, nt, nd  Ext: (-) bilat  lower extremity edema  Psychiatric: mood and affect appropriate, cr nr 2-12 grossly intact  Musculoskeletal:  Rom, muscular strength intact    Data:   Labs:  CBC:   Lab Results   Component Value Date    WBC 6.8 11/19/2020    RBC 3.04 11/19/2020    HGB 9.6 11/19/2020    HCT 31.4 11/19/2020    .3 11/19/2020    MCH 31.6 11/19/2020    MCHC 30.6 11/19/2020    RDW 13.5 11/19/2020     11/19/2020    MPV 10.0 11/19/2020     CBC with Differential:    Lab Results   Component Value Date    WBC 6.8 11/19/2020    RBC 3.04 11/19/2020    HGB 9.6 11/19/2020    HCT 31.4 11/19/2020     11/19/2020    .3 11/19/2020    MCH 31.6 11/19/2020    MCHC 30.6 11/19/2020    RDW 13.5 11/19/2020    NRBC 0.9 05/25/2020    SEGSPCT 71 08/16/2013    BANDSPCT 1 03/29/2016    METASPCT 0.9 05/10/2020    LYMPHOPCT 11.8 11/19/2020    PROMYELOPCT 0.9 05/09/2020    MONOPCT 11.4 11/19/2020    MYELOPCT 1.7 05/11/2020    BASOPCT 0.6 11/19/2020    MONOSABS 0.77 11/19/2020    LYMPHSABS 0.80 11/19/2020    EOSABS 0.31 11/19/2020    BASOSABS 0.04 11/19/2020     Hemoglobin/Hematocrit:    Lab Results   Component Value Date    HGB 9.6 11/19/2020    HCT 31.4 11/19/2020     CMP:    Lab Results   Component Value Date     11/19/2020    K 5.3 11/19/2020     11/19/2020    CO2 23 11/19/2020    BUN 79 11/19/2020    CREATININE 4.8 11/19/2020    GFRAA 11 11/19/2020    LABGLOM 9 11/19/2020    GLUCOSE 77 11/19/2020    GLUCOSE 149 10/12/2011    PROT 6.5 11/19/2020    LABALBU 2.7 11/19/2020    LABALBU 4.1 10/12/2011    CALCIUM 9.0 11/19/2020    BILITOT <0.2 11/19/2020    ALKPHOS 69 11/19/2020    AST 17 11/19/2020    ALT 11 11/19/2020     BMP:    Lab Results   Component Value Date     11/19/2020    K 5.3 11/19/2020     11/19/2020    CO2 23 11/19/2020    BUN 79 11/19/2020    LABALBU 2.7 11/19/2020    LABALBU 4.1 10/12/2011    CREATININE 4.8 11/19/2020    CALCIUM 9.0 11/19/2020    GFRAA 11 11/19/2020    LABGLOM 9 11/19/2020    GLUCOSE 77 11/19/2020    GLUCOSE 149 10/12/2011     BUN/Creatinine:    Lab Results   Component Value Date    BUN 79 11/19/2020    CREATININE 4.8 11/19/2020     Hepatic Function Panel:    Lab Results   Component Value Date    ALKPHOS 69 11/19/2020    ALT 11 11/19/2020    AST 17 11/19/2020    PROT 6.5 11/19/2020    BILITOT <0.2 11/19/2020    BILIDIR 0.8 06/25/2011    LABALBU 2.7 11/19/2020    LABALBU 4.1 10/12/2011     Albumin:    Lab Results   Component Value Date    LABALBU 2.7 11/19/2020    LABALBU 4.1 10/12/2011     Calcium:    Lab Results   Component Value Date    CALCIUM 9.0 11/19/2020     Ionized Calcium:    Lab Results   Component Value Date    IONCA 1.59 06/26/2013     Magnesium:    Lab Results   Component Value Date    MG 3.0 11/19/2020     Phosphorus:    Lab Results   Component Value Date    PHOS 7.1 11/19/2020     LDH:    Lab Results   Component Value Date     05/24/2020     Uric Acid:    Lab Results   Component Value Date    LABURIC 7.7 07/19/2019    URICACID 11.6 10/12/2011     PT/INR:    Lab Results   Component Value Date    PROTIME 11.3 05/27/2020    PROTIME 15.3 06/25/2011    INR 1.0 05/27/2020     PTT:    Lab Results   Component Value Date    APTT 24.6 05/21/2020   [APTT}  Troponin:    Lab Results   Component Value Date    TROPONINI 0.09 10/09/2020     U/A:    Lab Results   Component Value Date    COLORU Yellow 11/18/2020    PROTEINU 100 11/18/2020    PHUR 6.0 11/18/2020    WBCUA >20 11/18/2020    WBCUA 5-10 08/17/2011    RBCUA 2-5 11/18/2020    RBCUA 0-1 06/26/2013    YEAST Present 08/11/2020    BACTERIA MANY 11/18/2020    CLARITYU SL CLOUDY 11/18/2020    SPECGRAV 1.025 11/18/2020    LEUKOCYTESUR MODERATE 11/18/2020    UROBILINOGEN 0.2 11/18/2020    BILIRUBINUR Negative 11/18/2020    BILIRUBINUR NEGATIVE 08/17/2011    BLOODU TRACE-INTACT 11/18/2020    GLUCOSEU Negative 11/18/2020    GLUCOSEU NEGATIVE 08/17/2011    AMORPHOUS FEW 06/25/2011     ABG:    Lab Results   Component Value Date    PH 7.433 05/10/2020    PH 7.377 06/27/2011    PCO2 31.0 05/10/2020    PO2 106.3 05/10/2020    HCO3 20.3 05/10/2020    BE -3.2 05/10/2020    O2SAT 97.5 05/10/2020     Microalbumen/Creatinine ratio:  No components found for:  RUCREAT  FLP:    Lab Results   Component Value Date    TRIG 181 10/12/2011    HDL 37.0 10/12/2011    LDLCALC 118 10/12/2011     TSH:    Lab Results   Component Value Date    TSH 1.945 08/16/2013     VITAMIN B12: No components found for: B12  FOLATE:    Lab Results   Component Value Date    FOLATE 13.9 11/19/2020     Iron Saturation:  No components found for: PERCENTFE  FERRITIN:    Lab Results   Component Value Date    FERRITIN 273 11/19/2020     AMYLASE:    Lab Results   Component Value Date    AMYLASE 20 07/02/2011     LIPASE:    Lab Results   Component Value Date    LIPASE 14 01/31/2020     Urine Toxicology:  No components found for: IAMMENTA, IBARBIT, IBENZO, ICOCAINE, IMARTHC, IOPIATES, IPHENCYC  24 Hour Urine for Protein:  No components found for: RAWUPRO, UHRS3, RBYH52EA, UTV3  24 Hour Urine for Creatinine Clearance:  No components found for: CREAT4, UHRS10, UTV10     Imaging:  EXAMINATION:    RETROPERITONEAL ULTRASOUND OF THE KIDNEYS AND URINARY BLADDER         10/10/2020         COMPARISON:    04/11/2020         HISTORY:    ORDERING SYSTEM PROVIDED HISTORY: PERLA.  UTI.  Hypertension.  Diabetes on    dialysis.  History of renal atrophy    TECHNOLOGIST PROVIDED HISTORY:    Reason for exam:->PERLA    What reading provider will be dictating this exam?->CRC         FINDINGS:    RIGHT KIDNEY:         Length of visualized kidney: 9.2 cm         Cortical thickness: 7.6 mm.  Parenchyma hyperechoic again.         Focal lesion: Non-specific, smoothly marginated, hypoechoic lesions are    statistically most likely cysts.  There is no evidence of mural nodularity,    wall thickening, or calcification.  Largest lesion 2.5 cm.  No significant    interval change         Calculus: None         Hydronephrosis: None         Perinephric fluid: None         Hilar calcifications may be arterial.  Similar calcifications noted in the    visualized portion of the spleen may be arterial or granulomatous.         LEFT KIDNEY:

## 2020-11-21 LAB
ORGANISM: ABNORMAL
ORGANISM: ABNORMAL
URINE CULTURE, ROUTINE: ABNORMAL
URINE CULTURE, ROUTINE: ABNORMAL

## 2020-11-22 NOTE — DISCHARGE SUMMARY
Admit Date: 10/9/2020  8:36 PM   Discharge Date: 10/12/2020    Patient Active Problem List   Diagnosis    Neurologic gait dysfunction    [facility]al failure, acute on chronic ([facility] [address]    Diabetes mellitus ([facility] [address]    Hypertension    Arthritis    Kidney disease    Acute blood loss anemia    Knee problem    Anemia due to stage 3 chronic kidney disease    Primary osteoarthritis of both knees    Acute on chronic renal insufficiency    Acute renal failure (HCC)    Metabolic acidosis    Acute renal failure superimposed on chronic kidney disease, on chronic dialysis ([facility] [address]    Sepsis ([facility] [address]    2019 novel coronavirus disease (COVID-19)    GI bleed    Moderate protein-calorie malnutrition (HCC)    Hyperkalemia    PERLA (acute kidney injury) ([facility] [address]        Present on Admission:   Hyperkalemia   Moderate protein-calorie malnutrition (HCC)   Renal failure, acute on chronic (HCC)   Anemia due to stage 3 chronic kidney disease        James Esters A   Home Medication Instructions MUL:[id number]    Printed on:11/22/20 1102   Medication Information                      acetaminophen (TYLENOL) 325 MG tablet  Take 650 mg by mouth every 6 hours as needed for Pain or Fever              amLODIPine (NORVASC) 10 MG tablet  Take 10 mg by mouth daily             busPIRone (BUSPAR) 5 MG tablet  Take 5 mg by mouth 3 times daily              docusate sodium (COLACE) 100 MG capsule  Take 100 mg by mouth 2 times daily             epoetin derek-epbx (RETACRIT) 4000 UNIT/ML SOLN injection  Inject 2 mLs into the skin three times a week             escitalopram (LEXAPRO) 5 MG tablet  Take 10 mg by mouth daily             ferrous sulfate (IRON 325) 325 (65 Fe) MG tablet  Take 1 tablet by mouth daily (with breakfast)             gabapentin (NEURONTIN) 300 MG capsule  Take 300 mg by mouth 2 times daily.               insulin glargine (BASAGLAR KWIKPEN) 100 UNIT/ML injection pen  Inject 10 Units into the skin nightly             insulin lispro (HUMALOG) 100 UNIT/ML injection vial  Inject 0-10 Units into the skin 3 times daily (before meals) *Per Sliding Scale*    - 199  = give 2 units   - 249  = give 4 units   - 299  = give 6 units   - 349  = give 8 units   - 400 =  give 10 units  BS < 70 or > 400 call physician             melatonin 1 MG tablet  Take 3 mg by mouth nightly as needed for Sleep             metoprolol succinate (TOPROL XL) 25 MG extended release tablet  Take 1 tablet by mouth daily             ondansetron (ZOFRAN) 4 MG tablet  Take 4 mg by mouth every 6 hours as needed for Nausea or Vomiting              oxyCODONE-acetaminophen (PERCOCET) 5-325 MG per tablet  Take 1 tablet by mouth every 8 hours as needed for Pain.             polyethylene glycol (GLYCOLAX) 17 g packet  Take 17 g by mouth daily             senna (SENOKOT) 8.6 MG tablet  Take 1 tablet by mouth 2 times daily             sodium zirconium cyclosilicate (LOKELMA) 5 g PACK oral suspension  Take 5 g by mouth daily             vitamin B-1 (THIAMINE) 100 MG tablet  Take 200 mg by mouth daily                  Hospital Course/Procedures:69year-old with chronic kidney disease diet treated diabetes mellitus was sent to the emergency room and admitted on 10/9/2020 for abnormal labs. Her potassium was 6 hemoglobin was 7.2 and creatinine was 4.0. Hemoglobin repeated in the ER was 6.8. Patient had no uremic symptoms and was asymptomatic. She was admitted and placed on IV fluids and her potassium was corrected. She was given one unit of packed red blood cells. Nephrology was consulted. Discharge labs showed a hemoglobin of 7.8 and a potassium of 4.9 and a creatinine back to her baseline of 3.6.   She was discharged in stable condition back to the nursing home on 10/12/2012    Consultants Following:nephrology    Disposition:extended care facility    Follow-up:she will be followed by the facility physician and nephrology      Inna Huddleston  11/22/2020  11:01 AM

## 2021-01-07 ENCOUNTER — APPOINTMENT (OUTPATIENT)
Dept: ULTRASOUND IMAGING | Age: 71
End: 2021-01-07
Payer: MEDICARE

## 2021-01-07 ENCOUNTER — APPOINTMENT (OUTPATIENT)
Dept: GENERAL RADIOLOGY | Age: 71
End: 2021-01-07
Payer: MEDICARE

## 2021-01-07 ENCOUNTER — HOSPITAL ENCOUNTER (OUTPATIENT)
Age: 71
Setting detail: OBSERVATION
Discharge: OTHER INSTITUTION | End: 2021-01-10
Attending: EMERGENCY MEDICINE | Admitting: FAMILY MEDICINE
Payer: MEDICARE

## 2021-01-07 DIAGNOSIS — E87.5 HYPERKALEMIA: ICD-10-CM

## 2021-01-07 DIAGNOSIS — N18.9 ACUTE RENAL FAILURE SUPERIMPOSED ON CHRONIC KIDNEY DISEASE, UNSPECIFIED CKD STAGE, UNSPECIFIED ACUTE RENAL FAILURE TYPE (HCC): Primary | ICD-10-CM

## 2021-01-07 DIAGNOSIS — N17.9 ACUTE RENAL FAILURE SUPERIMPOSED ON CHRONIC KIDNEY DISEASE, UNSPECIFIED CKD STAGE, UNSPECIFIED ACUTE RENAL FAILURE TYPE (HCC): Primary | ICD-10-CM

## 2021-01-07 LAB
ALBUMIN SERPL-MCNC: 3 G/DL (ref 3.5–5.2)
ALP BLD-CCNC: 127 U/L (ref 35–104)
ALT SERPL-CCNC: 25 U/L (ref 0–32)
ANION GAP SERPL CALCULATED.3IONS-SCNC: 14 MMOL/L (ref 7–16)
AST SERPL-CCNC: 20 U/L (ref 0–31)
BACTERIA: ABNORMAL /HPF
BASOPHILS ABSOLUTE: 0.05 E9/L (ref 0–0.2)
BASOPHILS RELATIVE PERCENT: 0.7 % (ref 0–2)
BILIRUB SERPL-MCNC: <0.2 MG/DL (ref 0–1.2)
BILIRUBIN DIRECT: <0.2 MG/DL (ref 0–0.3)
BILIRUBIN URINE: NEGATIVE
BILIRUBIN, INDIRECT: ABNORMAL MG/DL (ref 0–1)
BLOOD, URINE: ABNORMAL
BUN BLDV-MCNC: 110 MG/DL (ref 8–23)
CALCIUM SERPL-MCNC: 9.3 MG/DL (ref 8.6–10.2)
CHLORIDE BLD-SCNC: 107 MMOL/L (ref 98–107)
CLARITY: ABNORMAL
CO2: 23 MMOL/L (ref 22–29)
COLOR: YELLOW
CREAT SERPL-MCNC: 5.6 MG/DL (ref 0.5–1)
EOSINOPHILS ABSOLUTE: 0.32 E9/L (ref 0.05–0.5)
EOSINOPHILS RELATIVE PERCENT: 4.2 % (ref 0–6)
EPITHELIAL CELLS, UA: ABNORMAL /HPF
GFR AFRICAN AMERICAN: 9
GFR NON-AFRICAN AMERICAN: 7 ML/MIN/1.73
GLUCOSE BLD-MCNC: 158 MG/DL (ref 74–99)
GLUCOSE URINE: NEGATIVE MG/DL
HCT VFR BLD CALC: 36.8 % (ref 34–48)
HEMOGLOBIN: 11.3 G/DL (ref 11.5–15.5)
IMMATURE GRANULOCYTES #: 0.03 E9/L
IMMATURE GRANULOCYTES %: 0.4 % (ref 0–5)
KETONES, URINE: NEGATIVE MG/DL
LEUKOCYTE ESTERASE, URINE: ABNORMAL
LYMPHOCYTES ABSOLUTE: 0.97 E9/L (ref 1.5–4)
LYMPHOCYTES RELATIVE PERCENT: 12.7 % (ref 20–42)
MCH RBC QN AUTO: 31.3 PG (ref 26–35)
MCHC RBC AUTO-ENTMCNC: 30.7 % (ref 32–34.5)
MCV RBC AUTO: 101.9 FL (ref 80–99.9)
MONOCYTES ABSOLUTE: 0.95 E9/L (ref 0.1–0.95)
MONOCYTES RELATIVE PERCENT: 12.4 % (ref 2–12)
NEUTROPHILS ABSOLUTE: 5.32 E9/L (ref 1.8–7.3)
NEUTROPHILS RELATIVE PERCENT: 69.6 % (ref 43–80)
NITRITE, URINE: NEGATIVE
PDW BLD-RTO: 13.2 FL (ref 11.5–15)
PH UA: 5.5 (ref 5–9)
PLATELET # BLD: 275 E9/L (ref 130–450)
PMV BLD AUTO: 9.6 FL (ref 7–12)
POTASSIUM SERPL-SCNC: 6 MMOL/L (ref 3.5–5)
PROTEIN UA: >=300 MG/DL
RBC # BLD: 3.61 E12/L (ref 3.5–5.5)
RBC UA: ABNORMAL /HPF (ref 0–2)
REASON FOR REJECTION: NORMAL
REJECTED TEST: NORMAL
SARS-COV-2, NAAT: NOT DETECTED
SODIUM BLD-SCNC: 144 MMOL/L (ref 132–146)
SPECIFIC GRAVITY UA: 1.02 (ref 1–1.03)
TOTAL PROTEIN: 7.3 G/DL (ref 6.4–8.3)
UROBILINOGEN, URINE: 0.2 E.U./DL
WBC # BLD: 7.6 E9/L (ref 4.5–11.5)
WBC UA: >20 /HPF (ref 0–5)

## 2021-01-07 PROCEDURE — G0378 HOSPITAL OBSERVATION PER HR: HCPCS

## 2021-01-07 PROCEDURE — 73030 X-RAY EXAM OF SHOULDER: CPT

## 2021-01-07 PROCEDURE — 71045 X-RAY EXAM CHEST 1 VIEW: CPT

## 2021-01-07 PROCEDURE — U0002 COVID-19 LAB TEST NON-CDC: HCPCS

## 2021-01-07 PROCEDURE — 6360000002 HC RX W HCPCS: Performed by: EMERGENCY MEDICINE

## 2021-01-07 PROCEDURE — 2580000003 HC RX 258: Performed by: EMERGENCY MEDICINE

## 2021-01-07 PROCEDURE — 76770 US EXAM ABDO BACK WALL COMP: CPT

## 2021-01-07 PROCEDURE — 2500000003 HC RX 250 WO HCPCS: Performed by: EMERGENCY MEDICINE

## 2021-01-07 PROCEDURE — 99284 EMERGENCY DEPT VISIT MOD MDM: CPT

## 2021-01-07 PROCEDURE — 80076 HEPATIC FUNCTION PANEL: CPT

## 2021-01-07 PROCEDURE — 6370000000 HC RX 637 (ALT 250 FOR IP): Performed by: EMERGENCY MEDICINE

## 2021-01-07 PROCEDURE — 81001 URINALYSIS AUTO W/SCOPE: CPT

## 2021-01-07 PROCEDURE — 96365 THER/PROPH/DIAG IV INF INIT: CPT

## 2021-01-07 PROCEDURE — 96375 TX/PRO/DX INJ NEW DRUG ADDON: CPT

## 2021-01-07 PROCEDURE — 93005 ELECTROCARDIOGRAM TRACING: CPT | Performed by: EMERGENCY MEDICINE

## 2021-01-07 PROCEDURE — 36415 COLL VENOUS BLD VENIPUNCTURE: CPT

## 2021-01-07 PROCEDURE — 80048 BASIC METABOLIC PNL TOTAL CA: CPT

## 2021-01-07 PROCEDURE — 85025 COMPLETE CBC W/AUTO DIFF WBC: CPT

## 2021-01-07 RX ORDER — SODIUM CHLORIDE 0.9 % (FLUSH) 0.9 %
10 SYRINGE (ML) INJECTION PRN
Status: DISCONTINUED | OUTPATIENT
Start: 2021-01-07 | End: 2021-01-10 | Stop reason: HOSPADM

## 2021-01-07 RX ORDER — DEXTROSE MONOHYDRATE 25 G/50ML
25 INJECTION, SOLUTION INTRAVENOUS ONCE
Status: COMPLETED | OUTPATIENT
Start: 2021-01-07 | End: 2021-01-07

## 2021-01-07 RX ORDER — NICOTINE POLACRILEX 4 MG
15 LOZENGE BUCCAL PRN
Status: DISCONTINUED | OUTPATIENT
Start: 2021-01-07 | End: 2021-01-10 | Stop reason: HOSPADM

## 2021-01-07 RX ORDER — DEXTROSE MONOHYDRATE 25 G/50ML
12.5 INJECTION, SOLUTION INTRAVENOUS PRN
Status: DISCONTINUED | OUTPATIENT
Start: 2021-01-07 | End: 2021-01-10 | Stop reason: HOSPADM

## 2021-01-07 RX ORDER — 0.9 % SODIUM CHLORIDE 0.9 %
1000 INTRAVENOUS SOLUTION INTRAVENOUS ONCE
Status: COMPLETED | OUTPATIENT
Start: 2021-01-07 | End: 2021-01-07

## 2021-01-07 RX ORDER — DEXTROSE MONOHYDRATE 50 MG/ML
100 INJECTION, SOLUTION INTRAVENOUS PRN
Status: DISCONTINUED | OUTPATIENT
Start: 2021-01-07 | End: 2021-01-10 | Stop reason: HOSPADM

## 2021-01-07 RX ORDER — SODIUM POLYSTYRENE SULFONATE 15 G/60ML
15 SUSPENSION ORAL; RECTAL ONCE
Status: COMPLETED | OUTPATIENT
Start: 2021-01-07 | End: 2021-01-07

## 2021-01-07 RX ADMIN — Medication 10 UNITS: at 21:23

## 2021-01-07 RX ADMIN — CALCIUM GLUCONATE 1 G: 98 INJECTION, SOLUTION INTRAVENOUS at 19:32

## 2021-01-07 RX ADMIN — SODIUM CHLORIDE 1000 ML: 9 INJECTION, SOLUTION INTRAVENOUS at 19:31

## 2021-01-07 RX ADMIN — DEXTROSE MONOHYDRATE 25 G: 25 INJECTION, SOLUTION INTRAVENOUS at 21:21

## 2021-01-07 RX ADMIN — SODIUM POLYSTYRENE SULFONATE 15 G: 15 SUSPENSION ORAL; RECTAL at 21:22

## 2021-01-07 RX ADMIN — SODIUM BICARBONATE 50 MEQ: 84 INJECTION, SOLUTION INTRAVENOUS at 21:22

## 2021-01-07 ASSESSMENT — ENCOUNTER SYMPTOMS
DIARRHEA: 0
EYE DISCHARGE: 0
SINUS PRESSURE: 0
COUGH: 0
BACK PAIN: 0
ABDOMINAL DISTENTION: 0
WHEEZING: 0
NAUSEA: 0
VOMITING: 0
EYE REDNESS: 0
SHORTNESS OF BREATH: 0
EYE PAIN: 0
SORE THROAT: 0

## 2021-01-07 ASSESSMENT — PAIN DESCRIPTION - ORIENTATION: ORIENTATION: LEFT

## 2021-01-07 ASSESSMENT — PAIN DESCRIPTION - FREQUENCY: FREQUENCY: CONTINUOUS

## 2021-01-07 ASSESSMENT — PAIN SCALES - GENERAL: PAINLEVEL_OUTOF10: 6

## 2021-01-07 ASSESSMENT — PAIN DESCRIPTION - PAIN TYPE: TYPE: ACUTE PAIN

## 2021-01-07 ASSESSMENT — PAIN DESCRIPTION - LOCATION: LOCATION: SHOULDER

## 2021-01-07 NOTE — ED NOTES
Pt placed on pure wick at this time for urine.  Will continue to monitor     Edgardo Zelaya RN  01/07/21 2356

## 2021-01-07 NOTE — ED PROVIDER NOTES
26-year-old female presents to the emergency department with suspected confusion, worsening renal failure and possible respiratory failure. The patient was sent over from nursing home after she made some statements couple days ago that showed that she was acting inappropriately. They placed her on oxygen and to see if it would help and it did not help. They eventually did lab work that showed her in worsening renal failure and sent her to the emergency department for further evaluation. Patient states she is not excited about possibly being on dialysis but does not want to die so she is willing to go forward with it. She states minimal other complaints other than left shoulder pain. He states this happened from therapy which she got hugged and it hurt. She states no chest pain shortness of breath fevers cough nausea vomiting diarrhea abdominal pain urinary symptoms leg swelling or other complaints at this time. The history is provided by the patient. Altered Mental Status  Presenting symptoms: confusion    Severity:  Mild  Most recent episode:  Yesterday  Episode history:  Single  Timing:  Intermittent  Progression:  Resolved  Chronicity:  New  Associated symptoms: no fever, no headaches, no nausea, no rash, no vomiting and no weakness         Review of Systems   Constitutional: Negative for chills and fever. HENT: Negative for ear pain, sinus pressure and sore throat. Eyes: Negative for pain, discharge and redness. Respiratory: Negative for cough, shortness of breath and wheezing. Cardiovascular: Negative for chest pain. Gastrointestinal: Negative for abdominal distention, diarrhea, nausea and vomiting. Genitourinary: Negative for dysuria and frequency. Musculoskeletal: Negative for arthralgias and back pain. Skin: Negative for rash and wound. Neurological: Negative for weakness and headaches. Hematological: Negative for adenopathy.    Psychiatric/Behavioral: Positive for confusion. All other systems reviewed and are negative. Physical Exam  Constitutional:       Appearance: She is well-developed. HENT:      Head: Normocephalic and atraumatic. Eyes:      Extraocular Movements: Extraocular movements intact. Pupils: Pupils are equal, round, and reactive to light. Neck:      Musculoskeletal: Normal range of motion and neck supple. Cardiovascular:      Rate and Rhythm: Normal rate and regular rhythm. Pulmonary:      Effort: Pulmonary effort is normal.      Breath sounds: Normal breath sounds. Abdominal:      General: Bowel sounds are normal.      Palpations: Abdomen is soft. Musculoskeletal:      Left shoulder: She exhibits tenderness. Neurological:      Mental Status: She is alert and oriented to person, place, and time. Cranial Nerves: No cranial nerve deficit. Psychiatric:         Mood and Affect: Mood normal.         Speech: Speech normal.         Behavior: Behavior normal.          Procedures     MDM  Number of Diagnoses or Management Options  Diagnosis management comments: 57-year-old female presents to emergency department from nursing home with worsening renal failure. She follows with Dr. Sampson Whaley. She has no complaints at this time. Apparently there is an episode of altered mental status the patient is answering questions appropriately on my evaluation. Labs were checked patient is making urine. Renal failure is shown with a creatinine of 5.6 which is worse for 4.8 in November. I discussed with the patient the possibility of having dialysis started she is a she does not want this but it would keep her alive she is willing because she does not want to die. Case was discussed with Dr. Hilda Najera who will admit the patient. Hyperkalemia protocol was initiated for potassium of 6.        ED Course as of Jan 07 1952   Meir Smith Jan 07, 2021   162 57-year-old female presents to the emergency department for concerns for worsening renal function and confusion. According to EMS about the patient and the patient was making statements that Franklyn Gerber had been shot yesterday which should make sense. Patient remembers making the statements and says she is not confused anymore. Nursing home put the patient on oxygen for concern for hypoxia. Oxygen was taken off here in the emergency department statement patient resting comfortably on 95%. Patient is answering all questions appropriately. She states that if she does need to be on dialysis she does not want to but she will because she does not want to die. She states minimal complaints today and appears to be answering all questions appropriately. [CF]      ED Course User Index  [CF] Johnson Duke, DO      --------------------------------------------- PAST HISTORY ---------------------------------------------  Past Medical History:  has a past medical history of Anemia, Anxiety, Arthritis, Chronic knee pain, Chronic pain syndrome, CKD (chronic kidney disease) stage 4, GFR 15-29 ml/min (HCC), Depression, Diabetes mellitus ([address] Dysphagia, oral phase, Gall stones, Generalized muscle weakness, Hypertension, Hypertensive kidney disease with stage 4 chronic kidney disease ([address] Kidney disease, Kidney stones, Obesity, Protein calorie malnutrition ([address] Sacral pressure ulcer, Sarcoidosis, SOBOE (shortness of breath on exertion), Tremor, Unspecified osteoarthritis, unspecified site, Urinary anomaly, and UTI (urinary tract infection). Past Surgical History:  has a past surgical history that includes Foot surgery ( ); Cystocopy ( );  section (x3); Upper gastrointestinal endoscopy (2.18.15); Cholecystectomy (3/31/16); Abdomen surgery (N/A, 2020); Upper gastrointestinal endoscopy (N/A, 2020); and Upper gastrointestinal endoscopy (N/A, 2020). Social History:  reports that she quit smoking about 9 years ago. She has a 30.00 pack-year smoking history.  She has never used smokeless tobacco. She reports that she does not drink alcohol or use drugs. Family History: family history includes Cancer in her sister. The patients home medications have been reviewed. Allergies: Patient has no known allergies.     -------------------------------------------------- RESULTS -------------------------------------------------    LABS:  Results for orders placed or performed during the hospital encounter of 01/07/21   CBC Auto Differential   Result Value Ref Range    WBC 7.6 4.5 - 11.5 E9/L    RBC 3.61 3.50 - 5.50 E12/L    Hemoglobin 11.3 (L) 11.5 - 15.5 g/dL    Hematocrit 36.8 34.0 - 48.0 %    .9 (H) 80.0 - 99.9 fL    MCH 31.3 26.0 - 35.0 pg    MCHC 30.7 (L) 32.0 - 34.5 %    RDW 13.2 11.5 - 15.0 fL    Platelets 052 919 - 468 E9/L    MPV 9.6 7.0 - 12.0 fL    Neutrophils % 69.6 43.0 - 80.0 %    Immature Granulocytes % 0.4 0.0 - 5.0 %    Lymphocytes % 12.7 (L) 20.0 - 42.0 %    Monocytes % 12.4 (H) 2.0 - 12.0 %    Eosinophils % 4.2 0.0 - 6.0 %    Basophils % 0.7 0.0 - 2.0 %    Neutrophils Absolute 5.32 1.80 - 7.30 E9/L    Immature Granulocytes # 0.03 E9/L    Lymphocytes Absolute 0.97 (L) 1.50 - 4.00 E9/L    Monocytes Absolute 0.95 0.10 - 0.95 E9/L    Eosinophils Absolute 0.32 0.05 - 0.50 E9/L    Basophils Absolute 0.05 0.00 - 0.20 E9/L   Urinalysis   Result Value Ref Range    Color, UA Yellow Straw/Yellow    Clarity, UA CLOUDY (A) Clear    Glucose, Ur Negative Negative mg/dL    Bilirubin Urine Negative Negative    Ketones, Urine Negative Negative mg/dL    Specific Gravity, UA 1.025 1.005 - 1.030    Blood, Urine SMALL (A) Negative    pH, UA 5.5 5.0 - 9.0    Protein, UA >=300 (A) Negative mg/dL    Urobilinogen, Urine 0.2 <2.0 E.U./dL    Nitrite, Urine Negative Negative    Leukocyte Esterase, Urine LARGE (A) Negative   SPECIMEN REJECTION   Result Value Ref Range    Rejected Test bmp,liver     Reason for Rejection see below    Basic metabolic panel   Result Value Ref Range    Sodium 144 132 - 146 mmol/L    Potassium 6.0 (H) 3.5 - 5.0 mmol/L    Chloride 107 98 - 107 mmol/L    CO2 23 22 - 29 mmol/L    Anion Gap 14 7 - 16 mmol/L    Glucose 158 (H) 74 - 99 mg/dL     (H) 8 - 23 mg/dL    CREATININE 5.6 (H) 0.5 - 1.0 mg/dL    GFR Non-African American 7 >=60 mL/min/1.73    GFR African American 9     Calcium 9.3 8.6 - 10.2 mg/dL   Hepatic function panel   Result Value Ref Range    Total Protein 7.3 6.4 - 8.3 g/dL    Alb 3.0 (L) 3.5 - 5.2 g/dL    Alkaline Phosphatase 127 (H) 35 - 104 U/L    ALT 25 0 - 32 U/L    AST 20 0 - 31 U/L    Total Bilirubin <0.2 0.0 - 1.2 mg/dL    Bilirubin, Direct <0.2 0.0 - 0.3 mg/dL    Bilirubin, Indirect see below 0.0 - 1.0 mg/dL       RADIOLOGY:  No results found. EKG: This EKG is signed and interpreted by me. Rate: 68  Rhythm: Sinus  Interpretation: NSR, LAD, LBBB  Comparison: changes compared to previous EKG      ------------------------- NURSING NOTES AND VITALS REVIEWED ---------------------------  Date / Time Roomed:  1/7/2021  4:30 PM  ED Bed Assignment:  11/11    The nursing notes within the ED encounter and vital signs as below have been reviewed. Patient Vitals for the past 24 hrs:   BP Temp Temp src Pulse Resp SpO2 Height Weight   01/07/21 1933 (!) 134/47 -- -- 64 18 98 % -- --   01/07/21 1647 102/79 -- -- 68 16 95 % 5' (1.524 m) 203 lb (92.1 kg)   01/07/21 1645 -- 97.5 °F (36.4 °C) Oral -- -- -- -- --       Oxygen Saturation Interpretation: Normal    ------------------------------------------ PROGRESS NOTES ------------------------------------------  Counseling:  I have spoken with the patient and discussed todays results, in addition to providing specific details for the plan of care and counseling regarding the diagnosis and prognosis.   Their questions are answered at this time and they are agreeable with the plan of admission.    --------------------------------- ADDITIONAL PROVIDER NOTES ---------------------------------  This patient's ED course included: a personal history and physicial examination, re-evaluation prior to disposition, multiple bedside re-evaluations, IV medications, cardiac monitoring and continuous pulse oximetry    This patient has remained hemodynamically stable during their ED course. Diagnosis:  1. Acute renal failure superimposed on chronic kidney disease, unspecified CKD stage, unspecified acute renal failure type ([facility] [address]    2. Hyperkalemia      Please note that the withdrawal or failure to initiate urgent interventions for this patient would likely result in a life threatening deterioration or permanent disability. Accordingly this patient received 30 minutes of critical care time, excluding separately billable procedures. Disposition:  Patient's disposition: Admit to telemetry  Patient's condition is fair.            Romie Dixon DO  01/07/21 1952

## 2021-01-08 LAB
ANION GAP SERPL CALCULATED.3IONS-SCNC: 14 MMOL/L (ref 7–16)
BUN BLDV-MCNC: 101 MG/DL (ref 8–23)
CALCIUM SERPL-MCNC: 9.1 MG/DL (ref 8.6–10.2)
CHLORIDE BLD-SCNC: 110 MMOL/L (ref 98–107)
CO2: 21 MMOL/L (ref 22–29)
CREAT SERPL-MCNC: 5.4 MG/DL (ref 0.5–1)
EKG ATRIAL RATE: 68 BPM
EKG P AXIS: 43 DEGREES
EKG P-R INTERVAL: 210 MS
EKG Q-T INTERVAL: 524 MS
EKG QRS DURATION: 142 MS
EKG QTC CALCULATION (BAZETT): 557 MS
EKG R AXIS: -42 DEGREES
EKG T AXIS: 72 DEGREES
EKG VENTRICULAR RATE: 68 BPM
GFR AFRICAN AMERICAN: 9
GFR NON-AFRICAN AMERICAN: 8 ML/MIN/1.73
GLUCOSE BLD-MCNC: 135 MG/DL (ref 74–99)
METER GLUCOSE: 100 MG/DL (ref 74–99)
METER GLUCOSE: 118 MG/DL (ref 74–99)
METER GLUCOSE: 131 MG/DL (ref 74–99)
METER GLUCOSE: 157 MG/DL (ref 74–99)
POTASSIUM SERPL-SCNC: 4.9 MMOL/L (ref 3.5–5)
SODIUM BLD-SCNC: 145 MMOL/L (ref 132–146)

## 2021-01-08 PROCEDURE — 36415 COLL VENOUS BLD VENIPUNCTURE: CPT

## 2021-01-08 PROCEDURE — 2580000003 HC RX 258: Performed by: FAMILY MEDICINE

## 2021-01-08 PROCEDURE — G0378 HOSPITAL OBSERVATION PER HR: HCPCS

## 2021-01-08 PROCEDURE — 80048 BASIC METABOLIC PNL TOTAL CA: CPT

## 2021-01-08 PROCEDURE — 6370000000 HC RX 637 (ALT 250 FOR IP): Performed by: FAMILY MEDICINE

## 2021-01-08 PROCEDURE — 87088 URINE BACTERIA CULTURE: CPT

## 2021-01-08 PROCEDURE — 82962 GLUCOSE BLOOD TEST: CPT

## 2021-01-08 RX ORDER — SENNA PLUS 8.6 MG/1
1 TABLET ORAL 2 TIMES DAILY
Status: DISCONTINUED | OUTPATIENT
Start: 2021-01-08 | End: 2021-01-08

## 2021-01-08 RX ORDER — OXYCODONE HYDROCHLORIDE AND ACETAMINOPHEN 5; 325 MG/1; MG/1
1 TABLET ORAL EVERY 8 HOURS PRN
Status: DISCONTINUED | OUTPATIENT
Start: 2021-01-08 | End: 2021-01-10 | Stop reason: HOSPADM

## 2021-01-08 RX ORDER — AMLODIPINE BESYLATE 10 MG/1
10 TABLET ORAL DAILY
Status: DISCONTINUED | OUTPATIENT
Start: 2021-01-08 | End: 2021-01-10 | Stop reason: HOSPADM

## 2021-01-08 RX ORDER — POLYETHYLENE GLYCOL 3350 17 G/17G
17 POWDER, FOR SOLUTION ORAL DAILY
Status: DISCONTINUED | OUTPATIENT
Start: 2021-01-08 | End: 2021-01-10 | Stop reason: HOSPADM

## 2021-01-08 RX ORDER — FUROSEMIDE 40 MG/1
40 TABLET ORAL DAILY
Status: ON HOLD | COMMUNITY
End: 2021-05-07 | Stop reason: HOSPADM

## 2021-01-08 RX ORDER — BUSPIRONE HYDROCHLORIDE 5 MG/1
5 TABLET ORAL 3 TIMES DAILY
Status: DISCONTINUED | OUTPATIENT
Start: 2021-01-08 | End: 2021-01-10 | Stop reason: HOSPADM

## 2021-01-08 RX ORDER — DOCUSATE SODIUM 100 MG/1
100 CAPSULE, LIQUID FILLED ORAL 2 TIMES DAILY
Status: DISCONTINUED | OUTPATIENT
Start: 2021-01-08 | End: 2021-01-08

## 2021-01-08 RX ORDER — METOPROLOL SUCCINATE 50 MG/1
50 TABLET, EXTENDED RELEASE ORAL 2 TIMES DAILY
Status: DISCONTINUED | OUTPATIENT
Start: 2021-01-08 | End: 2021-01-10 | Stop reason: HOSPADM

## 2021-01-08 RX ORDER — ONDANSETRON 4 MG/1
4 TABLET, FILM COATED ORAL EVERY 6 HOURS PRN
Status: DISCONTINUED | OUTPATIENT
Start: 2021-01-08 | End: 2021-01-08

## 2021-01-08 RX ORDER — LANOLIN ALCOHOL/MO/W.PET/CERES
3 CREAM (GRAM) TOPICAL NIGHTLY PRN
Status: DISCONTINUED | OUTPATIENT
Start: 2021-01-08 | End: 2021-01-10 | Stop reason: HOSPADM

## 2021-01-08 RX ORDER — ZINC SULFATE 50(220)MG
50 CAPSULE ORAL DAILY
Status: DISCONTINUED | OUTPATIENT
Start: 2021-01-08 | End: 2021-01-10 | Stop reason: HOSPADM

## 2021-01-08 RX ORDER — PROCHLORPERAZINE MALEATE 10 MG
10 TABLET ORAL EVERY 6 HOURS PRN
Status: DISCONTINUED | OUTPATIENT
Start: 2021-01-08 | End: 2021-01-10 | Stop reason: HOSPADM

## 2021-01-08 RX ORDER — ESCITALOPRAM OXALATE 10 MG/1
10 TABLET ORAL DAILY
Status: DISCONTINUED | OUTPATIENT
Start: 2021-01-08 | End: 2021-01-10 | Stop reason: HOSPADM

## 2021-01-08 RX ORDER — ALOGLIPTIN 6.25 MG/1
6.25 TABLET, FILM COATED ORAL DAILY
Status: DISCONTINUED | OUTPATIENT
Start: 2021-01-08 | End: 2021-01-10 | Stop reason: HOSPADM

## 2021-01-08 RX ORDER — ASCORBIC ACID 500 MG
500 TABLET ORAL DAILY
Status: DISCONTINUED | OUTPATIENT
Start: 2021-01-08 | End: 2021-01-10 | Stop reason: HOSPADM

## 2021-01-08 RX ORDER — INSULIN GLARGINE 100 [IU]/ML
10 INJECTION, SOLUTION SUBCUTANEOUS NIGHTLY
Status: DISCONTINUED | OUTPATIENT
Start: 2021-01-08 | End: 2021-01-10 | Stop reason: HOSPADM

## 2021-01-08 RX ORDER — SEVELAMER CARBONATE 800 MG/1
800 TABLET, FILM COATED ORAL
Status: DISCONTINUED | OUTPATIENT
Start: 2021-01-08 | End: 2021-01-10 | Stop reason: HOSPADM

## 2021-01-08 RX ORDER — VITAMIN B COMPLEX
2000 TABLET ORAL DAILY
Status: DISCONTINUED | OUTPATIENT
Start: 2021-01-08 | End: 2021-01-10 | Stop reason: HOSPADM

## 2021-01-08 RX ORDER — SENNA PLUS 8.6 MG/1
2 TABLET ORAL 2 TIMES DAILY
Status: DISCONTINUED | OUTPATIENT
Start: 2021-01-08 | End: 2021-01-10 | Stop reason: HOSPADM

## 2021-01-08 RX ORDER — METOPROLOL SUCCINATE 25 MG/1
25 TABLET, EXTENDED RELEASE ORAL DAILY
Status: DISCONTINUED | OUTPATIENT
Start: 2021-01-08 | End: 2021-01-08

## 2021-01-08 RX ORDER — LANOLIN ALCOHOL/MO/W.PET/CERES
200 CREAM (GRAM) TOPICAL DAILY
Status: DISCONTINUED | OUTPATIENT
Start: 2021-01-08 | End: 2021-01-10 | Stop reason: HOSPADM

## 2021-01-08 RX ORDER — GABAPENTIN 300 MG/1
300 CAPSULE ORAL 2 TIMES DAILY
Status: DISCONTINUED | OUTPATIENT
Start: 2021-01-08 | End: 2021-01-10 | Stop reason: HOSPADM

## 2021-01-08 RX ADMIN — AMLODIPINE BESYLATE 10 MG: 10 TABLET ORAL at 09:32

## 2021-01-08 RX ADMIN — ZINC SULFATE 220 MG (50 MG) CAPSULE 50 MG: CAPSULE at 09:34

## 2021-01-08 RX ADMIN — ESCITALOPRAM 10 MG: 10 TABLET, FILM COATED ORAL at 09:34

## 2021-01-08 RX ADMIN — OXYCODONE HYDROCHLORIDE AND ACETAMINOPHEN 500 MG: 500 TABLET ORAL at 09:33

## 2021-01-08 RX ADMIN — SENNOSIDES 17.2 MG: 8.6 TABLET, FILM COATED ORAL at 20:05

## 2021-01-08 RX ADMIN — BUSPIRONE HYDROCHLORIDE 5 MG: 5 TABLET ORAL at 21:53

## 2021-01-08 RX ADMIN — SODIUM ZIRCONIUM CYCLOSILICATE 5 G: 5 POWDER, FOR SUSPENSION ORAL at 12:13

## 2021-01-08 RX ADMIN — INSULIN GLARGINE 10 UNITS: 100 INJECTION, SOLUTION SUBCUTANEOUS at 20:09

## 2021-01-08 RX ADMIN — SEVELAMER CARBONATE 800 MG: 800 TABLET, FILM COATED ORAL at 17:30

## 2021-01-08 RX ADMIN — Medication 2000 UNITS: at 09:47

## 2021-01-08 RX ADMIN — SEVELAMER CARBONATE 800 MG: 800 TABLET, FILM COATED ORAL at 12:13

## 2021-01-08 RX ADMIN — METOPROLOL SUCCINATE 50 MG: 50 TABLET, EXTENDED RELEASE ORAL at 09:33

## 2021-01-08 RX ADMIN — INSULIN LISPRO 1 UNITS: 100 INJECTION, SOLUTION INTRAVENOUS; SUBCUTANEOUS at 12:04

## 2021-01-08 RX ADMIN — OXYCODONE HYDROCHLORIDE AND ACETAMINOPHEN 1 TABLET: 5; 325 TABLET ORAL at 20:05

## 2021-01-08 RX ADMIN — BUSPIRONE HYDROCHLORIDE 5 MG: 5 TABLET ORAL at 14:26

## 2021-01-08 RX ADMIN — Medication 200 MG: at 09:27

## 2021-01-08 RX ADMIN — SODIUM CHLORIDE, PRESERVATIVE FREE 10 ML: 5 INJECTION INTRAVENOUS at 20:06

## 2021-01-08 RX ADMIN — OXYCODONE HYDROCHLORIDE AND ACETAMINOPHEN 1 TABLET: 5; 325 TABLET ORAL at 06:52

## 2021-01-08 RX ADMIN — SEVELAMER CARBONATE 800 MG: 800 TABLET, FILM COATED ORAL at 09:51

## 2021-01-08 RX ADMIN — GABAPENTIN 300 MG: 300 CAPSULE ORAL at 20:05

## 2021-01-08 RX ADMIN — GABAPENTIN 300 MG: 300 CAPSULE ORAL at 09:33

## 2021-01-08 RX ADMIN — BUSPIRONE HYDROCHLORIDE 5 MG: 5 TABLET ORAL at 09:32

## 2021-01-08 RX ADMIN — METOPROLOL SUCCINATE 50 MG: 50 TABLET, EXTENDED RELEASE ORAL at 20:05

## 2021-01-08 ASSESSMENT — PAIN SCALES - GENERAL
PAINLEVEL_OUTOF10: 6

## 2021-01-08 ASSESSMENT — PAIN DESCRIPTION - LOCATION
LOCATION: SHOULDER
LOCATION: SHOULDER

## 2021-01-08 ASSESSMENT — PAIN DESCRIPTION - DESCRIPTORS: DESCRIPTORS: ACHING

## 2021-01-08 ASSESSMENT — PAIN DESCRIPTION - PAIN TYPE: TYPE: ACUTE PAIN

## 2021-01-08 ASSESSMENT — PAIN DESCRIPTION - FREQUENCY
FREQUENCY: CONTINUOUS
FREQUENCY: CONTINUOUS

## 2021-01-08 NOTE — PROGRESS NOTES
Consult for Dr. Marlee Quintero sent via [employer] text  8:57 AM  Thee Gomez, [facility]  1/8/2021  READ @ 8:57 AM

## 2021-01-08 NOTE — FLOWSHEET NOTE
Initial Inpatient Wound Care    Admit Date: 1/7/2021  4:30 PM    Reason for consult:  Coccyx wound  Significant history:  Confusion, worsening renal falure and possible respiratory failure. CKD    Wound history:  POA  Findings: alert and verbally appropriate  Heels intact.  Refuses heel protectors    01/08/21 1157   Wound 01/08/21 Coccyx   Date First Assessed/Time First Assessed: 01/08/21 0150   Present on Hospital Admission: Yes  Location: Coccyx   Wound Image    Wound Etiology Pressure Stage  4   Dressing Change Due 01/08/21   Wound Length (cm) 7 cm   Wound Width (cm) 3.5 cm   Wound Depth (cm) 0.4 cm   Wound Surface Area (cm^2) 24.5 cm^2   Change in Wound Size % (l*w) 15.11   Wound Volume (cm^3) 9.8 cm^3   Wound Healing % 66   Wound Assessment Pink/red   Drainage Amount Large   Drainage Description Serosanguinous   Odor None   Carmen-wound Assessment   (moist, red)   perimeter of wound darkened red  Impression:  Stage 4 coccyx wound POA      Plan: lo air loss, comfort glides  Elevate  Heels  Dressing to coccyx wound  dietician    Atilio Castillo 1/8/2021 12:01 PM

## 2021-01-08 NOTE — PROGRESS NOTES
Spoke to Dr. Gladis Liu regarding diet order and med rec. See new orders. Surgery is scheduled on 4/24/2019 at 10:30am per Dr. Escoto for Laparoscopic Bilateral Salpingectomy. Consent is signed and labs are ordered. Pt agreeable to date and time of surgery.

## 2021-01-08 NOTE — CARE COORDINATION
Introduced my self and provided explanation of CM role to patient. Patient is awake, alert, and aware of current diagnosis and treatment plan including nephrology consult and potential initiation of hemodialysis. Patient relates that she has been a resident at [facility] since August 2020. She acknowledges a plan to return on discharge. Call placed to [facility], liaison for facility. VM left with inquiry as to bed hold status, etc.    Will await return call. Will follow along with  and assist with discharge planning as necessary. 0930  Received notification from [facility] at [facility] that patient's bed is being held. Patient can return w/o authorization. Notify facility on discharge. Jabari Bustamante, MSN, RN  [facility] Case Management  [phone]

## 2021-01-08 NOTE — DISCHARGE INSTR - COC
Continuity of Care Form    Patient Name: Iván Young   :  1950  MRN:  [id number]    Admit date:  2021  Discharge date:  1/10/2021    Code Status Order: Prior   Advance Directives:   [facility] Documentation     Date/Time Healthcare Directive Type of Healthcare Directive Copy in [address] Agent's Name Healthcare Agent's Phone Number    21 [phone]  Yes, patient has an advance directive for healthcare treatment  Durable power of  for health care  --  --  --  --          Admitting Physician:  Josselyn Baker MD  PCP: Josselyn Baker MD    Discharging Nurse: [facility] Unit/Room#: 4/0598-F  Discharging Unit Phone Number: [phone]    Emergency Contact:   Extended Emergency Contact Information  Primary Emergency Contact: Johnson Mcguire   [address] Phone: [phone]  Relation: Child  Secondary Emergency Contact: Ellie [address] Phone: [phone]  Relation: Child    Past Surgical History:  Past Surgical History:   Procedure Laterality Date    ABDOMEN SURGERY N/A 2020    SACRAL WOUND Leontine Dense [facility] WITH TIME AVAIL AM performed by Aziza Mccall MD at [address]  x3   [facility]  3/31/16    Laparoscopic-Dr. Yoan Johnson    CYSTOSCOPY       for kidney stones    FOOT SURGERY       right     UPPER GASTROINTESTINAL ENDOSCOPY  2.18.15    Dr. Royce Lay Findings: Mild Gastrits and Duodenitis, 2cm Hiatal Hernia    UPPER GASTROINTESTINAL ENDOSCOPY N/A 2020    EGD CONTROL HEMORRHAGE performed by Aziza Mccall MD at [facility] 2020    EGD BEDSIDE performed by Ricardo Bernard MD at [address]       Immunization History: There is no immunization history for the selected administration types on file for this patient.     Active Problems:  Patient Active Problem List   Diagnosis Code    Neurologic gait dysfunction R26.9    Renal failure, acute on chronic (HCC) N17.9, N18.9    Diabetes mellitus ([facility] E11.9    Hypertension I10    Arthritis M19.90    Kidney disease N28.9    Acute blood loss anemia D62    Knee problem M25.9    Anemia due to stage 3 chronic kidney disease N18.30, D63.1    Primary osteoarthritis of both knees M17.0    Acute on chronic renal insufficiency N28.9, N18.9    Acute renal failure (HCC) J33.7    Metabolic acidosis C79.6    Acute renal failure superimposed on chronic kidney disease, on chronic dialysis (HCC) N17.9, N18.9, Z99.2    Sepsis ([facility] A41.9    2019 novel coronavirus disease (COVID-19) U07.1    GI bleed K92.2    Moderate protein-calorie malnutrition (HCC) E44.0    Hyperkalemia E87.5    PERLA (acute kidney injury) ([facility] N17.9       Isolation/Infection:   Isolation          No Isolation        Patient Infection Status     Infection Onset Added Last Indicated Last Indicated By Review Planned Expiration Resolved Resolved By    None active    Resolved    COVID-19 Rule Out 21 COVID-19 (Ordered)   21 Rule-Out Test Resulted    COVID-19 Rule Out 10/09/20 10/09/20 10/09/20 COVID-19 (Ordered)   10/09/20 Rule-Out Test Resulted    COVID-19 Rule Out 20 COVID-19 (Ordered)   20     VRE 20 Culture, Wound   10/12/20 Letitia Castleman, RN    Enterococcus faecium Wound-Coccyx 20    MDRO (multi-drug resistant organism) 20 Culture, Wound   10/12/20 Letitia Castleman, RN    COVID-19 Rule Out 20 COVID-19 (Ordered)   20 Rule-Out Test Resulted    DETECTED 2020    COVID-19 Rule Out 05/14/20 05/15/20 05/14/20 Covid-19 Ambulatory (Ordered)   20 Rule-Out Test Resulted    Detected 2020    COVID-19 Rule Out 20 COVID-19 (Ordered)   20 Rule-Out Test Resulted    DETECTED 2020    C-diff Rule Out 20 20 CLOSTRIDIUM DIFFICILE EIA (Ordered)   20 Brooke Guillen RN    Does not meet criteria    MRSA 20 Culture, Wound   20 Amanda Stanton RN    Wound buttock 5/3/20    COVID-19 20 COVID-19   20     Detected 2020, 2020, 2020, 5/3/2020    COVID-19 Rule Out 20 COVID-19 (Ordered)   20 Rule-Out Test Resulted    DETECTED 5/3/2020          Nurse Assessment:  Last Vital Signs: BP (!) 145/83   Pulse 69   Temp 98.1 °F (36.7 °C) (Oral)   Resp 16   Ht 5' (1.524 m)   Wt 194 lb 9.6 oz (88.3 kg)   SpO2 98%   BMI 38.01 kg/m²     Last documented pain score (0-10 scale): Pain Level: 6  Last Weight:   Wt Readings from Last 1 Encounters:   21 194 lb 9.6 oz (88.3 kg)     Mental Status:  oriented and alert    IV Access:  - None    Nursing Mobility/ADLs:  Walking   Assisted  Transfer  Assisted  Bathing  Assisted  Dressing  Assisted  Toileting  Assisted  Feeding  Assisted  Med Admin  Assisted  Med Delivery   whole    Wound Care Documentation and Therapy:  Wound 10/10/20 Coccyx (Active)   Number of days: 90       Wound 21 Coccyx (Active)   Dressing Status New dressing applied 21 015   Wound Cleansed Cleansed with saline 21 015   Dressing/Treatment Other (comment) 21 015   Dressing Change Due 21 015   Wound Length (cm) 7.4 cm 21 015   Wound Width (cm) 3.9 cm 21 015   Wound Depth (cm) 1 cm 21 015   Wound Surface Area (cm^2) 28.86 cm^2 21 015   Wound Volume (cm^3) 28.86 cm^3 21 015   Wound Assessment Pink/red 21 0150   Odor None 21 0150   Carmen-wound Assessment Other (Comment) 21 0150   Number of days: 0        Elimination:  Continence:   · Bowel: yes  · Bladder: No  Urinary Catheter: None   Colostomy/Ileostomy/Ileal Conduit: No       Date of Last BM: 2021  No intake or output data in the 24 hours ending 01/08/21 0922  No intake/output data recorded. Safety Concerns:     None    Impairments/Disabilities:      Vision and Hearing    Nutrition Therapy:  Current Nutrition Therapy:   - Oral Diet:  General    Routes of Feeding: Oral  Liquids: Thin Liquids  Daily Fluid Restriction: no  Last Modified Barium Swallow with Video (Video Swallowing Test): not done    Treatments at the Time of Hospital Discharge:   Respiratory Treatments: ***  Oxygen Therapy:  is not on home oxygen therapy. Ventilator:    - No ventilator support    Rehab Therapies: Physical Therapy and Occupational Therapy  Weight Bearing Status/Restrictions: No weight bearing restirctions  Other Medical Equipment (for information only, NOT a DME order):  walker  Other Treatments: ***    Patient's personal belongings (please select all that are sent with patient):  Glasses, Dentures lower    RN SIGNATURE:  Electronically signed by Opal Hendricks RN on 1/10/21 at 3:25 PM EST    CASE MANAGEMENT/SOCIAL WORK SECTION    Inpatient Status Date: ***    Readmission Risk Assessment Score:  Readmission Risk              Risk of Unplanned Readmission:        0           Discharging [facility] Facility/ Agency   · [facility]  [facility] [address]  · Phone:[phone]  · LRR:[phone]    Dialysis Facility (if applicable)   · Name:  · Address:  · Dialysis Schedule:  · Phone:  · Fax:    / signature: Electronically signed by SHANTA Roberts on 1/8/2021 at 9:23 AM      PHYSICIAN SECTION    Prognosis: {Prognosis:3633620792}    Condition at Discharge: [address] Patient Condition:[id number]}    Rehab Potential (if transferring to Rehab): {Prognosis:5896522611}    Recommended Labs or Other Treatments After Discharge: ***    Physician Certification: I certify the above information and transfer of Anthony Weber  is necessary for the continuing treatment of the diagnosis listed and that she requires {Admit to Appropriate Level of Care:09060}

## 2021-01-08 NOTE — ED NOTES
Transporting patient to room [address], MARK  88/21/90 0095       Nohemy Cooley LPN  28/40/60 2798

## 2021-01-08 NOTE — CONSULTS
GASTROINTESTINAL ENDOSCOPY  2.18.15    Dr. Shena Juan Findings: Mild Gastrits and Duodenitis, 2cm Hiatal Hernia    UPPER GASTROINTESTINAL ENDOSCOPY N/A 5/8/2020    EGD CONTROL HEMORRHAGE performed by Ema Laws MD at [address] [place] [address] N/A 5/22/2020    EGD BEDSIDE performed by Mary Aguilar MD at [address] [place] [address]       Family History   Problem Relation Age of Onset    Cancer Sister         reports that she quit smoking about 9 years ago. She has a 30.00 pack-year smoking history. She has never used smokeless tobacco. She reports that she does not drink alcohol or use drugs. Allergies:  Patient has no known allergies.     Current Medications:        amLODIPine (NORVASC) tablet 10 mg, Daily      busPIRone (BUSPAR) tablet 5 mg, TID      escitalopram (LEXAPRO) tablet 10 mg, Daily      gabapentin (NEURONTIN) capsule 300 mg, BID      insulin glargine (LANTUS) injection vial 10 Units, Nightly      magnesium hydroxide (MILK OF MAGNESIA) 400 MG/5ML suspension 30 mL, BID PRN      melatonin tablet 3 mg, Nightly PRN      oxyCODONE-acetaminophen (PERCOCET) 5-325 MG per tablet 1 tablet, Q8H PRN      polyethylene glycol (GLYCOLAX) packet 17 g, Daily      sevelamer (RENVELA) tablet 800 mg, TID WC      thiamine tablet 200 mg, Daily      ascorbic acid (VITAMIN C) tablet 500 mg, Daily      vitamin D (CHOLECALCIFEROL) tablet 2,000 Units, Daily      zinc sulfate (ZINCATE) capsule 50 mg, Daily      alogliptin (NESINA) tablet 6.25 mg, Daily      metoprolol succinate (TOPROL XL) extended release tablet 50 mg, BID      senna (SENOKOT) tablet 17.2 mg, BID      insulin lispro (HUMALOG) injection vial 10 Units, TID WC      insulin lispro (HUMALOG) injection vial 0-6 Units, TID WC      insulin lispro (HUMALOG) injection vial 0-3 Units, Nightly      prochlorperazine (COMPAZINE) tablet 10 mg, Q6H PRN      sodium zirconium cyclosilicate (LOKELMA) oral suspension 5 g, Daily     sodium chloride flush 0.9 % injection 10 mL, PRN      glucose (GLUTOSE) 40 % oral gel 15 g, PRN      dextrose 50 % IV solution, PRN      glucagon (rDNA) injection 1 mg, PRN      dextrose 5 % solution, PRN        Review of Systems:   Pertinent items are noted in HPI. Remainder of a complete review of systems is (-) other than stated in the HPI    Physical exam:   Constitutional:  Elderly female in NAD  Vitals: VITALS:  /64   Pulse 71   Temp 97.7 °F (36.5 °C) (Temporal)   Resp 16   Ht 5' (1.524 m)   Wt 194 lb 9.6 oz (88.3 kg)   SpO2 95%   BMI 38.01 kg/m²   24HR INTAKE/OUTPUT:    No intake or output data in the 24 hours ending 01/08/21 1602  URINARY CATHETER OUTPUT (Castelan):     DRAIN/TUBE OUTPUT:     VENT SETTINGS:  Vent Information  SpO2: 95 %  Additional Respiratory  Assessments  Pulse: 71  Resp: 16  SpO2: 95 %    Skin: no rash, turgor wnl  Heent:  eomi, mmm  Neck: no bruits or jvd noted  Cardiovascular:PMI lat displaced   S1, S2 without S3 or a rub  Respiratory: CTA B without w/r/r  Abdomen:  +bs, soft, nt, nd  Ext: (-) bilat  lower extremity edema  Psychiatric: mood and affect appropriate, cr nr 2-12 grossly intact  Musculoskeletal:  Rom, muscular strength intact    Data:   Labs:  CBC:   Lab Results   Component Value Date    WBC 7.6 01/07/2021    RBC 3.61 01/07/2021    HGB 11.3 01/07/2021    HCT 36.8 01/07/2021    .9 01/07/2021    MCH 31.3 01/07/2021    MCHC 30.7 01/07/2021    RDW 13.2 01/07/2021     01/07/2021    MPV 9.6 01/07/2021     CBC with Differential:    Lab Results   Component Value Date    WBC 7.6 01/07/2021    RBC 3.61 01/07/2021    HGB 11.3 01/07/2021    HCT 36.8 01/07/2021     01/07/2021    .9 01/07/2021    MCH 31.3 01/07/2021    MCHC 30.7 01/07/2021    RDW 13.2 01/07/2021    NRBC 0.9 05/25/2020    SEGSPCT 71 08/16/2013    BANDSPCT 1 03/29/2016    METASPCT 0.9 05/10/2020    LYMPHOPCT 12.7 01/07/2021    PROMYELOPCT 0.9 05/09/2020    MONOPCT 12.4 01/07/2021 MYELOPCT 1.7 05/11/2020    BASOPCT 0.7 01/07/2021    MONOSABS 0.95 01/07/2021    LYMPHSABS 0.97 01/07/2021    EOSABS 0.32 01/07/2021    BASOSABS 0.05 01/07/2021     Hemoglobin/Hematocrit:    Lab Results   Component Value Date    HGB 11.3 01/07/2021    HCT 36.8 01/07/2021     CMP:    Lab Results   Component Value Date     01/08/2021    K 4.9 01/08/2021    K 5.3 11/19/2020     01/08/2021    CO2 21 01/08/2021     01/08/2021    CREATININE 5.4 01/08/2021    GFRAA 9 01/08/2021    LABGLOM 8 01/08/2021    GLUCOSE 135 01/08/2021    GLUCOSE 149 10/12/2011    PROT 7.3 01/07/2021    LABALBU 3.0 01/07/2021    LABALBU 4.1 10/12/2011    CALCIUM 9.1 01/08/2021    BILITOT <0.2 01/07/2021    ALKPHOS 127 01/07/2021    AST 20 01/07/2021    ALT 25 01/07/2021     BMP:    Lab Results   Component Value Date     01/08/2021    K 4.9 01/08/2021    K 5.3 11/19/2020     01/08/2021    CO2 21 01/08/2021     01/08/2021    LABALBU 3.0 01/07/2021    LABALBU 4.1 10/12/2011    CREATININE 5.4 01/08/2021    CALCIUM 9.1 01/08/2021    GFRAA 9 01/08/2021    LABGLOM 8 01/08/2021    GLUCOSE 135 01/08/2021    GLUCOSE 149 10/12/2011     BUN/Creatinine:    Lab Results   Component Value Date     01/08/2021    CREATININE 5.4 01/08/2021     Hepatic Function Panel:    Lab Results   Component Value Date    ALKPHOS 127 01/07/2021    ALT 25 01/07/2021    AST 20 01/07/2021    PROT 7.3 01/07/2021    BILITOT <0.2 01/07/2021    BILIDIR <0.2 01/07/2021    IBILI see below 01/07/2021    LABALBU 3.0 01/07/2021    LABALBU 4.1 10/12/2011     Albumin:    Lab Results   Component Value Date    LABALBU 3.0 01/07/2021    LABALBU 4.1 10/12/2011     Calcium:    Lab Results   Component Value Date    CALCIUM 9.1 01/08/2021     Ionized Calcium:    Lab Results   Component Value Date    IONCA 1.59 06/26/2013     Magnesium:    Lab Results   Component Value Date    MG 3.0 11/19/2020     Phosphorus:    Lab Results   Component Value Date    PHOS 7.1 11/19/2020     LDH:    Lab Results   Component Value Date     05/24/2020     Uric Acid:    Lab Results   Component Value Date    LABURIC 7.7 07/19/2019    URICACID 11.6 10/12/2011     PT/INR:    Lab Results   Component Value Date    PROTIME 11.3 05/27/2020    PROTIME 15.3 06/25/2011    INR 1.0 05/27/2020     PTT:    Lab Results   Component Value Date    APTT 24.6 05/21/2020   [APTT}  Troponin:    Lab Results   Component Value Date    TROPONINI 0.09 10/09/2020     U/A:    Lab Results   Component Value Date    COLORU Yellow 01/07/2021    PROTEINU >=300 01/07/2021    PHUR 5.5 01/07/2021    WBCUA >20 01/07/2021    WBCUA 5-10 08/17/2011    RBCUA 5-10 01/07/2021    RBCUA 0-1 06/26/2013    YEAST Present 08/11/2020    BACTERIA MANY 01/07/2021    CLARITYU CLOUDY 01/07/2021    SPECGRAV 1.025 01/07/2021    LEUKOCYTESUR LARGE 01/07/2021    UROBILINOGEN 0.2 01/07/2021    BILIRUBINUR Negative 01/07/2021    BILIRUBINUR NEGATIVE 08/17/2011    BLOODU SMALL 01/07/2021    GLUCOSEU Negative 01/07/2021    GLUCOSEU NEGATIVE 08/17/2011    AMORPHOUS FEW 06/25/2011     ABG:    Lab Results   Component Value Date    PH 7.433 05/10/2020    PH 7.377 06/27/2011    PCO2 31.0 05/10/2020    PO2 106.3 05/10/2020    HCO3 20.3 05/10/2020    BE -3.2 05/10/2020    O2SAT 97.5 05/10/2020     Microalbumen/Creatinine ratio:  No components found for: RUCREAT  FLP:    Lab Results   Component Value Date    TRIG 181 10/12/2011    HDL 37.0 10/12/2011    LDLCALC 118 10/12/2011     TSH:    Lab Results   Component Value Date    TSH 1.945 08/16/2013     VITAMIN B12: No components found for: B12  FOLATE:    Lab Results   Component Value Date    FOLATE 13.9 11/19/2020     Iron Saturation:  No components found for: PERCENTFE  FERRITIN:    Lab Results   Component Value Date    FERRITIN 273 11/19/2020     AMYLASE:    Lab Results   Component Value Date    AMYLASE 20 07/02/2011     LIPASE:    Lab Results   Component Value Date    LIPASE 14 01/31/2020     Urine Bilateral renal lesions again most likely cysts.  These appear unchanged.         Nonspecific bilateral renal parenchymal hyperechogenicity may again reflect    chronic medical renal disease               Assessment  1-CKD Stage G5 with baseline serum creatinine of 3.2 and e-GFR=14ml/min in the setting of the DM2, HTN and Sarcoidosis  UA with protein >300mg/dl, Small blood, large LE, WBC >20, Hernando many  PLAN:1.Pt will need Vasc surgery for access-would prefer having an AVF placed rather than TDC if can control the K+  2. Check UC    2-Hyperkalemia in the setting of the Advanced CKD  Improved post therapy  PLAN:1. Continue Lokelma   2. Follow K+     3-Anemia in CKD  HgB above goal 10  11/19/20 Ferritin 273, Iron Sat 25%, Folate 13.9, Vit B 12 836  PLAN:1.Recheck Fe++ studies    4- Sec HPTH of Renal Origin  Ca++ WNL  11/2020 PTH 61  11/2020Unspecified Vit D 13  PLAN:1. Check PO4, PTH, Vit D   2.  Continue Vit D supplement    5- HTN with CKD 5/ESRD  HgB at goal <140/90  PLAN:1. Follow on the metoprolol and amlodipine      Thank you Dr. Donnie Amato MD for allowing us to participate in care of [facility]  4:02 PM  1/8/2021

## 2021-01-09 LAB
ALBUMIN SERPL-MCNC: 2.7 G/DL (ref 3.5–5.2)
ALP BLD-CCNC: 90 U/L (ref 35–104)
ALT SERPL-CCNC: 16 U/L (ref 0–32)
ANION GAP SERPL CALCULATED.3IONS-SCNC: 12 MMOL/L (ref 7–16)
AST SERPL-CCNC: 19 U/L (ref 0–31)
BILIRUB SERPL-MCNC: <0.2 MG/DL (ref 0–1.2)
BUN BLDV-MCNC: 105 MG/DL (ref 8–23)
CALCIUM SERPL-MCNC: 8.9 MG/DL (ref 8.6–10.2)
CHLORIDE BLD-SCNC: 105 MMOL/L (ref 98–107)
CO2: 23 MMOL/L (ref 22–29)
CREAT SERPL-MCNC: 5.8 MG/DL (ref 0.5–1)
FERRITIN: 302 NG/ML
FOLATE: 19.7 NG/ML (ref 4.8–24.2)
GFR AFRICAN AMERICAN: 9
GFR NON-AFRICAN AMERICAN: 7 ML/MIN/1.73
GLUCOSE BLD-MCNC: 90 MG/DL (ref 74–99)
HCT VFR BLD CALC: 31.2 % (ref 34–48)
HEMOGLOBIN: 9.5 G/DL (ref 11.5–15.5)
IRON SATURATION: 33 % (ref 15–50)
IRON: 65 MCG/DL (ref 37–145)
MAGNESIUM: 2.9 MG/DL (ref 1.6–2.6)
MCH RBC QN AUTO: 31.7 PG (ref 26–35)
MCHC RBC AUTO-ENTMCNC: 30.4 % (ref 32–34.5)
MCV RBC AUTO: 104 FL (ref 80–99.9)
METER GLUCOSE: 174 MG/DL (ref 74–99)
METER GLUCOSE: 194 MG/DL (ref 74–99)
METER GLUCOSE: 217 MG/DL (ref 74–99)
METER GLUCOSE: 95 MG/DL (ref 74–99)
PARATHYROID HORMONE INTACT: 63 PG/ML (ref 15–65)
PDW BLD-RTO: 13.2 FL (ref 11.5–15)
PHOSPHORUS: 7.3 MG/DL (ref 2.5–4.5)
PLATELET # BLD: 205 E9/L (ref 130–450)
PMV BLD AUTO: 9.6 FL (ref 7–12)
POTASSIUM SERPL-SCNC: 4.5 MMOL/L (ref 3.5–5)
RBC # BLD: 3 E12/L (ref 3.5–5.5)
SODIUM BLD-SCNC: 140 MMOL/L (ref 132–146)
TOTAL IRON BINDING CAPACITY: 198 MCG/DL (ref 250–450)
TOTAL PROTEIN: 6.6 G/DL (ref 6.4–8.3)
VITAMIN B-12: 836 PG/ML (ref 211–946)
VITAMIN D 25-HYDROXY: 13 NG/ML (ref 30–100)
WBC # BLD: 4.8 E9/L (ref 4.5–11.5)

## 2021-01-09 PROCEDURE — 83735 ASSAY OF MAGNESIUM: CPT

## 2021-01-09 PROCEDURE — 2580000003 HC RX 258: Performed by: FAMILY MEDICINE

## 2021-01-09 PROCEDURE — 82728 ASSAY OF FERRITIN: CPT

## 2021-01-09 PROCEDURE — 80053 COMPREHEN METABOLIC PANEL: CPT

## 2021-01-09 PROCEDURE — 83540 ASSAY OF IRON: CPT

## 2021-01-09 PROCEDURE — G0378 HOSPITAL OBSERVATION PER HR: HCPCS

## 2021-01-09 PROCEDURE — 84100 ASSAY OF PHOSPHORUS: CPT

## 2021-01-09 PROCEDURE — 6370000000 HC RX 637 (ALT 250 FOR IP): Performed by: FAMILY MEDICINE

## 2021-01-09 PROCEDURE — 99224 PR SBSQ OBSERVATION CARE/DAY 15 MINUTES: CPT | Performed by: FAMILY MEDICINE

## 2021-01-09 PROCEDURE — 82306 VITAMIN D 25 HYDROXY: CPT

## 2021-01-09 PROCEDURE — 82962 GLUCOSE BLOOD TEST: CPT

## 2021-01-09 PROCEDURE — 36415 COLL VENOUS BLD VENIPUNCTURE: CPT

## 2021-01-09 PROCEDURE — 83970 ASSAY OF PARATHORMONE: CPT

## 2021-01-09 PROCEDURE — 82746 ASSAY OF FOLIC ACID SERUM: CPT

## 2021-01-09 PROCEDURE — 85027 COMPLETE CBC AUTOMATED: CPT

## 2021-01-09 PROCEDURE — 83550 IRON BINDING TEST: CPT

## 2021-01-09 PROCEDURE — 82607 VITAMIN B-12: CPT

## 2021-01-09 PROCEDURE — 99223 1ST HOSP IP/OBS HIGH 75: CPT | Performed by: SURGERY

## 2021-01-09 RX ADMIN — SEVELAMER CARBONATE 800 MG: 800 TABLET, FILM COATED ORAL at 11:24

## 2021-01-09 RX ADMIN — Medication 200 MG: at 09:38

## 2021-01-09 RX ADMIN — SODIUM ZIRCONIUM CYCLOSILICATE 5 G: 5 POWDER, FOR SUSPENSION ORAL at 10:58

## 2021-01-09 RX ADMIN — BUSPIRONE HYDROCHLORIDE 5 MG: 5 TABLET ORAL at 22:09

## 2021-01-09 RX ADMIN — ZINC SULFATE 220 MG (50 MG) CAPSULE 50 MG: CAPSULE at 09:38

## 2021-01-09 RX ADMIN — Medication 2000 UNITS: at 09:38

## 2021-01-09 RX ADMIN — SODIUM CHLORIDE, PRESERVATIVE FREE 10 ML: 5 INJECTION INTRAVENOUS at 22:09

## 2021-01-09 RX ADMIN — METOPROLOL SUCCINATE 50 MG: 50 TABLET, EXTENDED RELEASE ORAL at 22:08

## 2021-01-09 RX ADMIN — INSULIN LISPRO 2 UNITS: 100 INJECTION, SOLUTION INTRAVENOUS; SUBCUTANEOUS at 16:24

## 2021-01-09 RX ADMIN — INSULIN LISPRO 1 UNITS: 100 INJECTION, SOLUTION INTRAVENOUS; SUBCUTANEOUS at 22:11

## 2021-01-09 RX ADMIN — AMLODIPINE BESYLATE 10 MG: 10 TABLET ORAL at 09:38

## 2021-01-09 RX ADMIN — BUSPIRONE HYDROCHLORIDE 5 MG: 5 TABLET ORAL at 09:38

## 2021-01-09 RX ADMIN — SEVELAMER CARBONATE 800 MG: 800 TABLET, FILM COATED ORAL at 16:23

## 2021-01-09 RX ADMIN — METOPROLOL SUCCINATE 50 MG: 50 TABLET, EXTENDED RELEASE ORAL at 09:38

## 2021-01-09 RX ADMIN — BUSPIRONE HYDROCHLORIDE 5 MG: 5 TABLET ORAL at 14:24

## 2021-01-09 RX ADMIN — OXYCODONE HYDROCHLORIDE AND ACETAMINOPHEN 500 MG: 500 TABLET ORAL at 09:38

## 2021-01-09 RX ADMIN — INSULIN LISPRO 1 UNITS: 100 INJECTION, SOLUTION INTRAVENOUS; SUBCUTANEOUS at 11:17

## 2021-01-09 RX ADMIN — SEVELAMER CARBONATE 800 MG: 800 TABLET, FILM COATED ORAL at 09:38

## 2021-01-09 RX ADMIN — INSULIN GLARGINE 10 UNITS: 100 INJECTION, SOLUTION SUBCUTANEOUS at 22:11

## 2021-01-09 RX ADMIN — SENNOSIDES 17.2 MG: 8.6 TABLET, FILM COATED ORAL at 22:08

## 2021-01-09 RX ADMIN — GABAPENTIN 300 MG: 300 CAPSULE ORAL at 22:09

## 2021-01-09 RX ADMIN — GABAPENTIN 300 MG: 300 CAPSULE ORAL at 09:39

## 2021-01-09 RX ADMIN — SODIUM CHLORIDE, PRESERVATIVE FREE 10 ML: 5 INJECTION INTRAVENOUS at 09:39

## 2021-01-09 RX ADMIN — ESCITALOPRAM 10 MG: 10 TABLET, FILM COATED ORAL at 09:38

## 2021-01-09 ASSESSMENT — PAIN - FUNCTIONAL ASSESSMENT: PAIN_FUNCTIONAL_ASSESSMENT: PREVENTS OR INTERFERES SOME ACTIVE ACTIVITIES AND ADLS

## 2021-01-09 ASSESSMENT — PAIN SCALES - GENERAL
PAINLEVEL_OUTOF10: 0
PAINLEVEL_OUTOF10: 0

## 2021-01-09 ASSESSMENT — PAIN DESCRIPTION - PROGRESSION: CLINICAL_PROGRESSION: NOT CHANGED

## 2021-01-09 ASSESSMENT — PAIN DESCRIPTION - ORIENTATION: ORIENTATION: LEFT

## 2021-01-09 ASSESSMENT — PAIN DESCRIPTION - DESCRIPTORS: DESCRIPTORS: ACHING;DULL

## 2021-01-09 ASSESSMENT — PAIN DESCRIPTION - PAIN TYPE: TYPE: ACUTE PAIN

## 2021-01-09 NOTE — PLAN OF CARE
Problem: Falls - Risk of:  Goal: Will remain free from falls  Description: Will remain free from falls  1/9/2021 1143 by Lucho Mccall RN  Outcome: Met This Shift    Problem: Falls - Risk of:  Goal: Absence of physical injury  Description: Absence of physical injury  1/9/2021 1143 by Lucho Mccall RN  Outcome: Met This Shift  Problem: Skin Integrity:  Goal: Will show no infection signs and symptoms  Description: Will show no infection signs and symptoms  1/9/2021 1143 by Lucho Mccall RN  Outcome: Met This Shift

## 2021-01-09 NOTE — PROGRESS NOTES
Nephrology Consult  Note  Patient's Name: Alfredo Acevedo  11:54 AM  2021    Nephrologist: Arely Ga    Reason for Consult:  CKD G5 with hyperkalemia  Requesting Physician:  Catarino Thompson MD    Chief Complaint:  Abnormal labs    History Obtained From:  patient and past medical records    History of Present Ilness -From  Note:  Alfredo Acevedo is a 69 y.o. female with prior history CKD Stage G5 with baseline serum creatinine of 3.2 and e-GFR=14ml/min; Morbid obesity; History of diabetic foot ulcer; Previous . Sarcoidosis, Nephrolithiasis. Pt presented to the ED from the [facility] for  Altered mental status. Her  K+ 6.0 and the Cr 5.4mg/dl. She was treated with kayexalate. No C/O N/V/D/C, no CP or SOB. Pt states appetite has been good. . Discussed KRT with pt who states she is ready to start if necessary    Interval history:  2021:  Patient sitting up in bed. States she is feeling really good but her legs still feel weak. Denies sob, pain. Past Medical History:   Diagnosis Date    Anemia     Anxiety     Arthritis     knees    Chronic knee pain     Chronic pain syndrome     CKD (chronic kidney disease) stage 4, GFR 15-29 ml/min ([facility])     Depression     Diabetes mellitus (HCC)     type 2    Dysphagia, oral phase 2020    Gall stones     Generalized muscle weakness     Hypertension     Hypertensive kidney disease with stage 4 chronic kidney disease (HCC)     Kidney disease     Kidney stones     Obesity     Protein calorie malnutrition (HCC)     Sacral pressure ulcer 2020    Sarcoidosis     SOBOE (shortness of breath on exertion)     Tremor     Unspecified osteoarthritis, unspecified site     Urinary anomaly leakage,urinate>1/night    UTI (urinary tract infection)        Past Surgical History:   Procedure Laterality Date    ABDOMEN SURGERY N/A 2020    SACRAL WOUND DEBRIDEMENT CALL   WITH TIME AVAIL AM performed by Mendy Jiang MD at [place] [address]  CHOLECYSTECTOMY  3/31/16    Laparoscopic-Dr. Darell Vides    CYSTOSCOPY  2011     for kidney stones    FOOT SURGERY  2009     right     UPPER GASTROINTESTINAL ENDOSCOPY  2.18.15    Dr. Mathew Console Findings: Mild Gastrits and Duodenitis, 2cm Hiatal Hernia    UPPER GASTROINTESTINAL ENDOSCOPY N/A 5/8/2020    EGD CONTROL HEMORRHAGE performed by Waldemar Ferreira MD at [facility] N/A 5/22/2020    EGD BEDSIDE performed by Sohail Moreno MD at [facility]       Family History   Problem Relation Age of Onset    Cancer Sister         reports that she quit smoking about 9 years ago. She has a 30.00 pack-year smoking history. She has never used smokeless tobacco. She reports that she does not drink alcohol or use drugs. Allergies:  Patient has no known allergies.     Current Medications:        amLODIPine (NORVASC) tablet 10 mg, Daily      busPIRone (BUSPAR) tablet 5 mg, TID      escitalopram (LEXAPRO) tablet 10 mg, Daily      gabapentin (NEURONTIN) capsule 300 mg, BID      insulin glargine (LANTUS) injection vial 10 Units, Nightly      magnesium hydroxide (MILK OF MAGNESIA) 400 MG/5ML suspension 30 mL, BID PRN      melatonin tablet 3 mg, Nightly PRN      oxyCODONE-acetaminophen (PERCOCET) 5-325 MG per tablet 1 tablet, Q8H PRN      polyethylene glycol (GLYCOLAX) packet 17 g, Daily      sevelamer (RENVELA) tablet 800 mg, TID WC      thiamine tablet 200 mg, Daily      ascorbic acid (VITAMIN C) tablet 500 mg, Daily      vitamin D (CHOLECALCIFEROL) tablet 2,000 Units, Daily      zinc sulfate (ZINCATE) capsule 50 mg, Daily      alogliptin (NESINA) tablet 6.25 mg, Daily      metoprolol succinate (TOPROL XL) extended release tablet 50 mg, BID      senna (SENOKOT) tablet 17.2 mg, BID      insulin lispro (HUMALOG) injection vial 10 Units, TID WC      insulin lispro (HUMALOG) injection vial 0-6 Units, TID WC      insulin lispro (HUMALOG) injection vial 0-3 Units, LYMPHOPCT 12.7 01/07/2021    PROMYELOPCT 0.9 05/09/2020    MONOPCT 12.4 01/07/2021    MYELOPCT 1.7 05/11/2020    BASOPCT 0.7 01/07/2021    MONOSABS 0.95 01/07/2021    LYMPHSABS 0.97 01/07/2021    EOSABS 0.32 01/07/2021    BASOSABS 0.05 01/07/2021     Hemoglobin/Hematocrit:    Lab Results   Component Value Date    HGB 9.5 01/09/2021    HCT 31.2 01/09/2021     CMP:    Lab Results   Component Value Date     01/09/2021    K 4.5 01/09/2021    K 5.3 11/19/2020     01/09/2021    CO2 23 01/09/2021     01/09/2021    CREATININE 5.8 01/09/2021    GFRAA 9 01/09/2021    LABGLOM 7 01/09/2021    GLUCOSE 90 01/09/2021    GLUCOSE 149 10/12/2011    PROT 6.6 01/09/2021    LABALBU 2.7 01/09/2021    LABALBU 4.1 10/12/2011    CALCIUM 8.9 01/09/2021    BILITOT <0.2 01/09/2021    ALKPHOS 90 01/09/2021    AST 19 01/09/2021    ALT 16 01/09/2021     BMP:    Lab Results   Component Value Date     01/09/2021    K 4.5 01/09/2021    K 5.3 11/19/2020     01/09/2021    CO2 23 01/09/2021     01/09/2021    LABALBU 2.7 01/09/2021    LABALBU 4.1 10/12/2011    CREATININE 5.8 01/09/2021    CALCIUM 8.9 01/09/2021    GFRAA 9 01/09/2021    LABGLOM 7 01/09/2021    GLUCOSE 90 01/09/2021    GLUCOSE 149 10/12/2011     BUN/Creatinine:    Lab Results   Component Value Date     01/09/2021    CREATININE 5.8 01/09/2021     Hepatic Function Panel:    Lab Results   Component Value Date    ALKPHOS 90 01/09/2021    ALT 16 01/09/2021    AST 19 01/09/2021    PROT 6.6 01/09/2021    BILITOT <0.2 01/09/2021    BILIDIR <0.2 01/07/2021    IBILI see below 01/07/2021    LABALBU 2.7 01/09/2021    LABALBU 4.1 10/12/2011     Albumin:    Lab Results   Component Value Date    LABALBU 2.7 01/09/2021    LABALBU 4.1 10/12/2011     Calcium:    Lab Results   Component Value Date    CALCIUM 8.9 01/09/2021     Ionized Calcium:    Lab Results   Component Value Date    IONCA 1.59 06/26/2013     Magnesium:    Lab Results   Component Value Date    MG 2.9 01/09/2021     Phosphorus:    Lab Results   Component Value Date    PHOS 7.3 01/09/2021     LDH:    Lab Results   Component Value Date     05/24/2020     Uric Acid:    Lab Results   Component Value Date    LABURIC 7.7 07/19/2019    URICACID 11.6 10/12/2011     PT/INR:    Lab Results   Component Value Date    PROTIME 11.3 05/27/2020    PROTIME 15.3 06/25/2011    INR 1.0 05/27/2020     PTT:    Lab Results   Component Value Date    APTT 24.6 05/21/2020   [APTT}  Troponin:    Lab Results   Component Value Date    TROPONINI 0.09 10/09/2020     U/A:    Lab Results   Component Value Date    COLORU Yellow 01/07/2021    PROTEINU >=300 01/07/2021    PHUR 5.5 01/07/2021    WBCUA >20 01/07/2021    WBCUA 5-10 08/17/2011    RBCUA 5-10 01/07/2021    RBCUA 0-1 06/26/2013    YEAST Present 08/11/2020    BACTERIA MANY 01/07/2021    CLARITYU CLOUDY 01/07/2021    SPECGRAV 1.025 01/07/2021    LEUKOCYTESUR LARGE 01/07/2021    UROBILINOGEN 0.2 01/07/2021    BILIRUBINUR Negative 01/07/2021    BILIRUBINUR NEGATIVE 08/17/2011    BLOODU SMALL 01/07/2021    GLUCOSEU Negative 01/07/2021    GLUCOSEU NEGATIVE 08/17/2011    AMORPHOUS FEW 06/25/2011     ABG:    Lab Results   Component Value Date    PH 7.433 05/10/2020    PH 7.377 06/27/2011    PCO2 31.0 05/10/2020    PO2 106.3 05/10/2020    HCO3 20.3 05/10/2020    BE -3.2 05/10/2020    O2SAT 97.5 05/10/2020     Microalbumen/Creatinine ratio:  No components found for: RUCREAT  FLP:    Lab Results   Component Value Date    TRIG 181 10/12/2011    HDL 37.0 10/12/2011    LDLCALC 118 10/12/2011     TSH:    Lab Results   Component Value Date    TSH 1.945 08/16/2013     VITAMIN B12: No components found for: B12  FOLATE:    Lab Results   Component Value Date    FOLATE 19.7 01/09/2021     Iron Saturation:  No components found for: PERCENTFE  FERRITIN:    Lab Results   Component Value Date    FERRITIN 302 01/09/2021     AMYLASE:    Lab Results   Component Value Date    AMYLASE 20 07/02/2011 LIPASE:    Lab Results   Component Value Date    LIPASE 14 01/31/2020     Urine Toxicology:  No components found for: IAMMENTA, IBARBIT, IBENZO, ICOCAINE, IMARTHC, IOPIATES, IPHENCYC  24 Hour Urine for Protein:  No components found for: RAWUPRO, UHRS3, WPEC67RT, UTV3  24 Hour Urine for Creatinine Clearance:  No components found for: CREAT4, UHRS10, UTV10     Imaging:  EXAMINATION:    RETROPERITONEAL ULTRASOUND OF THE KIDNEYS AND URINARY BLADDER         10/10/2020         COMPARISON:    04/11/2020         HISTORY:    ORDERING SYSTEM PROVIDED HISTORY: PERLA.  UTI.  Hypertension.  Diabetes on    dialysis.  History of renal atrophy    TECHNOLOGIST PROVIDED HISTORY:    Reason for exam:->PERLA    What reading provider will be dictating this exam?->CRC         FINDINGS:    RIGHT KIDNEY:         Length of visualized kidney: 9.2 cm         Cortical thickness: 7.6 mm.  Parenchyma hyperechoic again.         Focal lesion: Non-specific, smoothly marginated, hypoechoic lesions are    statistically most likely cysts.  There is no evidence of mural nodularity,    wall thickening, or calcification.  Largest lesion 2.5 cm.  No significant    interval change         Calculus: None         Hydronephrosis: None         Perinephric fluid: None         Hilar calcifications may be arterial.  Similar calcifications noted in the    visualized portion of the spleen may be arterial or granulomatous.         LEFT KIDNEY:         Length of visualized kidney: 7.4 cm         Cortical thickness: 7 mm.  Parenchyma hyperechoic again.         Focal lesion: Non-specific, smoothly marginated, hypoechoic lesions are    statistically most likely cysts.  There is no evidence of mural nodularity,    wall thickening, or calcification.  Largest lesion 17 mm.  No significant    interval change         Calculus: None         Hydronephrosis: None         Perinephric fluid: None         URINARY BLADDER:         Not visualized.  Reportedly, catheter in place.           Impression    No sonographic evidence of acute renal pathology         Bilateral renal lesions again most likely cysts.  These appear unchanged.         Nonspecific bilateral renal parenchymal hyperechogenicity may again reflect    chronic medical renal disease               Assessment  1-CKD Stage G5 with baseline serum creatinine of 3.2 and e-GFR=14ml/min in the setting of the DM2, HTN and Sarcoidosis  UA with protein >300mg/dl, Small blood, large LE, WBC >20, Hernando many  PLAN:1.Pt will need Vasc surgery for access-would prefer having an AVF placed as an outpatient rather than TDC if can control the K+  2. Awaiting urine culture    2-Hyperkalemia in the setting of the Advanced CKD  Improved post therapy  PLAN:1. Continue Lokelma   2. Follow K+     3-Anemia in CKD  HgB below target ofabove goal 10  Iron studies adequate -Ferritin 3.3, Iron Sat 33%, Folate 19.7, Vit B 12 836  PLAN: Monitor Hgb    4- Sec HPTH of Renal Origin  Ca++ WNL  PTH 63, Vit D 13, PO4 7.3  PLAN:1. Sevelamer added 1/8  2. Continue Vit D supplement    5- HTN with CKD 5/ESRD  HgB at goal <140/90  PLAN:1. Follow on the metoprolol and amlodipine      Thank you Dr. Darien Marti MD for allowing us to participate in care of [address] [place] [address]  11:54 AM  1/9/2021     Patient seen and examined. Patient renal function remains poor but fairly stable. Hyperkalemia is controlled. The issue of the renal placement therapy was discussed with the patient. The more desirable way to go would be to plan for placement of an AV fistula and avoid tunneled hemodialysis catheter is poor if possible. Hyperkalemia is well controlled so we may have the time  to go with AV fistula. Will consult vascular surgery      Agree with above formulation and plan.       Karla Pagan MD  Nephrology        Electronically signed by Karla Pagan MD on 1/9/2021 at 3:53 PM

## 2021-01-09 NOTE — CONSULTS
Vascular Surgery Inpatient Consultation Note      Reason for Consultation: AV fistula    HISTORY OF PRESENT ILLNESS:                The patient is a 69 y.o. female who is admitted to the hospital for treatment of hyperkalemia and worsening kidney function. Patient has history of chronic kidney disease and previous admissions for renal failure and hyperkalemia requiring dialysis. She had developed renal recovery to where she did not require dialysis however presents with volume overload and hyperkalemia. She is not felt to require dialysis at this time but it is felt to be inevitable. Vascular surgery is consulted for evaluation and treatment. She states that she is right-hand dominant. IMPRESSION: Chronic kidney disease. Due to the patient's obesity and age, it is unrealistic to think that she will develop an AV fistula. She will require an AV graft. An AV graft will be able to be accessed much sooner than waiting for fistula to develop. This can be placed as an outpatient. I will have her follow-up with Dr. Yasmin Mckenzie in the office to arrange for surgery.         Patient Active Problem List   Diagnosis Code    Neurologic gait dysfunction R26.9    Renal failure, acute on chronic (HCC) N17.9, N18.9    Diabetes mellitus ([address] E11.9    Hypertension I10    Arthritis M19.90    Kidney disease N28.9    Acute blood loss anemia D62    Knee problem M25.9    Anemia due to stage 3 chronic kidney disease N18.30, D63.1    Primary osteoarthritis of both knees M17.0    Acute on chronic renal insufficiency N28.9, N18.9    Acute renal failure (HCC) U14.3    Metabolic acidosis W92.5    Acute renal failure superimposed on chronic kidney disease, on chronic dialysis (HCC) N17.9, N18.9, Z99.2    Sepsis ([address] A41.9    2019 novel coronavirus disease (COVID-19) U07.1    GI bleed K92.2    Moderate protein-calorie malnutrition (HCC) E44.0    Hyperkalemia E87.5    PERLA (acute kidney injury) ([address] N17.9 Past Medical History:   Diagnosis Date    Anemia     Anxiety     Arthritis     knees    Chronic knee pain     Chronic pain syndrome     CKD (chronic kidney disease) stage 4, GFR 15-29 ml/min (HCC)     Depression     Diabetes mellitus (HCC)     type 2    Dysphagia, oral phase 07/31/2020    Gall stones     Generalized muscle weakness     Hypertension     Hypertensive kidney disease with stage 4 chronic kidney disease (HCC)     Kidney disease     Kidney stones     Obesity     Protein calorie malnutrition (HCC)     Sacral pressure ulcer 07/31/2020    Sarcoidosis     SOBOE (shortness of breath on exertion)     Tremor     Unspecified osteoarthritis, unspecified site     Urinary anomaly leakage,urinate>1/night    UTI (urinary tract infection)         Past Surgical History:   Procedure Laterality Date    ABDOMEN SURGERY N/A 5/4/2020    SACRAL WOUND DEBRIDEMENT CALL   WITH TIME AVAIL AM performed by Rashad Blandon MD at [address] [place] [address]  x3   [address] [place] [address]  3/31/16    Laparoscopic-Dr. Braxton Burrell    CYSTOSCOPY  2011     for kidney stones    FOOT SURGERY  2009     right     UPPER GASTROINTESTINAL ENDOSCOPY  2.18.15    Dr. Zahida Ellis Findings: Mild Gastrits and Duodenitis, 2cm Hiatal Hernia    UPPER GASTROINTESTINAL ENDOSCOPY N/A 5/8/2020    EGD CONTROL HEMORRHAGE performed by Rashad Blandon MD at [address] [place] [address] 5/22/2020    EGD BEDSIDE performed by Hoda Kelly MD at [facility] ENDOSCOPY       Current Medications:    dextrose        magnesium hydroxide, melatonin, oxyCODONE-acetaminophen, prochlorperazine, sodium chloride flush, glucose, dextrose, glucagon (rDNA), dextrose    amLODIPine  10 mg Oral Daily    busPIRone  5 mg Oral TID    escitalopram  10 mg Oral Daily    gabapentin  300 mg Oral BID    insulin glargine  10 Units Subcutaneous Nightly    polyethylene glycol  17 g Oral Daily    sevelamer  800 mg Oral TID WC    vitamin B-1  200 mg Oral Daily    vitamin C  500 mg Oral Daily    Vitamin D  2,000 Units Oral Daily    zinc sulfate  50 mg Oral Daily    alogliptin  6.25 mg Oral Daily    metoprolol succinate  50 mg Oral BID    senna  2 tablet Oral BID    insulin lispro  10 Units Subcutaneous TID WC    insulin lispro  0-6 Units Subcutaneous TID WC    insulin lispro  0-3 Units Subcutaneous Nightly    sodium zirconium cyclosilicate  5 g Oral Daily        Allergies:  Patient has no known allergies.     Social History     Socioeconomic History    Marital status:      Spouse name: Not on file    Number of children: Not on file    Years of education: Not on file    Highest education level: Not on file   Occupational History    Not on file   Social Needs    Financial resource strain: Not on file    Food insecurity     Worry: Not on file     Inability: Not on file    Transportation needs     Medical: Not on file     Non-medical: Not on file   Tobacco Use    Smoking status: Former Smoker     Packs/day: 1.00     Years: 30.00     Pack years: 30.00     Quit date: 2011     Years since quittin.4    Smokeless tobacco: Never Used   Substance and Sexual Activity    Alcohol use: No    Drug use: No    Sexual activity: Not Currently   Lifestyle    Physical activity     Days per week: Not on file     Minutes per session: Not on file    Stress: Not on file   Relationships    Social connections     Talks on phone: Not on file     Gets together: Not on file     Attends [employer] service: Not on file     Active member of club or organization: Not on file     Attends meetings of clubs or organizations: Not on file     Relationship status: Not on file    Intimate partner violence     Fear of current or ex partner: Not on file     Emotionally abused: Not on file     Physically abused: Not on file     Forced sexual activity: Not on file   Other Topics Concern    Not on file   Social History Narrative    Not on file distress, well developed and well- nourished  Neurologic: no cranial nerve deficit, speech normal  Head: normocephalic and atraumatic  Eyes: extraocular eye movements intact, conjunctivae normal  ENT: external ear and ear canal normal bilaterally, nose without deformity  Pulmonary/Chest: normal air movement, no respiratory distress  Cardiovascular: normal rate, regular rhythm  Abdomen: non-distended, no masses  Musculoskeletal: no joint deformity or tenderness  Extremities: leg edema bilaterally, obese  Skin: warm and dry, no rash or erythema    PULSE EXAM      Right      Left   Brachial     Radial 2 2   Femoral     Popliteal     Dorsalis Pedis     Posterior Tibial     (3=normal, 2=diminished, 1=barely palpable, 4=widened)      LABS:    Lab Results   Component Value Date    WBC 4.8 01/09/2021    HGB 9.5 (L) 01/09/2021    HCT 31.2 (L) 01/09/2021     01/09/2021    PROTIME 11.3 05/27/2020    INR 1.0 05/27/2020    K 4.5 01/09/2021     (H) 01/09/2021    CREATININE 5.8 (H) 01/09/2021       RADIOLOGY:

## 2021-01-09 NOTE — H&P
[address] [facility]                  [address]                              HISTORY AND PHYSICAL    PATIENT NAME: Hannah Blandon                      :        1950  MED REC NO:   [id number]                            ROOM:       0402  ACCOUNT NO:   [de-identified]                           ADMIT DATE: 2021  PROVIDER:     Cecy Foley MD    DATE OF ADMISSION:  2021. CHIEF COMPLAINT:  Hyperkalemia and worsening kidney failure. HISTORY OF PRESENT ILLNESS:  A 74-year-old with multiple admissions for  worsening renal failure with hyperkalemia. The patient has always had a  discussion about hemodialysis as has declined in the past, but states  she is now ready to have hemodialysis because she does not want to \"I  don't want to die. \"  States she might have been a little confused  yesterday, but does not appear to be confused today. RECENT AND PRESENT MEDICATIONS:  See med rec in the chart. PAST MEDICAL HISTORY:  Positive for chronic kidney disease, diabetes,  hypertension, osteoarthritis, previous COVID infection, and previous  large sacral decubitus. SOCIAL HISTORY:  She is at [facility]. Quit smoking in . Does not  drink. FAMILY MEDICAL HISTORY:  NONCONTRIBUTORY. REVIEW OF SYSTEMS:  ALLERGIES:  None known. SKIN AND LYMPHATICS:  Negative. CENTRAL NERVOUS SYSTEM:  States she thought President _____ Marcelo Mendes was  killed yesterday during the [address], but realizes he was not. Does not think she is confused at this point. Denies any headaches. CIRCULATORY:  Denies chest pain, orthopnea, or PND. DIGESTIVE:  Denies nausea, vomiting, diarrhea, melena, or hematochezia. GENITOURINARY:  Positive for chronic kidney disease, which is worsening. MUSCULOSKELETAL:  She has severe osteoarthritis of both knees, but is  not considered a surgical candidate. PHYSICAL EXAMINATION:  GENERAL:  The patient lying in bed. She is in no distress. VITAL SIGNS:  Show a temperature of 98.1, pulse 69, respirations 16,  blood pressure 145/83, O2 sats 98% on room air. SKIN:  Shows pallor, but no jaundice. HEENT:  Eyes reveal no icterus. NECK:  Supple without bruits or masses. CHEST:  Clear to auscultation. HEART:  Regular rate and rhythm at this time. ABDOMEN:  Obese, soft, and nontender at this point. EXTREMITIES:  Reveal mild edema. No cyanosis at this point. NEUROLOGICAL:  She is alert and oriented x3. There is no focal  neurological deficits at this time. LABORATORY STUDIES:  CMP shows a creatinine of 5.4 with a BUN of 101. Sugar 135. Blood count yesterday was 11.3. White count was 7.6 and  platelet count was 238,341. COVID testing was negative. DIAGNOSTIC STUDIES:  Ultrasound of the kidneys showed no hydronephrosis. X-ray of the chest:  No acute process. X-ray of the left shoulder due  to her complains of pain showed diffuse osteopenia, but otherwise  negative. IMPRESSION:  Acute-on-chronic kidney failure with hyperkalemia, which  was corrected in the ER. PLAN:  She will be seen by Nephrology. She wants to proceed with  hemodialysis. I am assuming they will consult Vascular for access. DISCHARGE PLAN:  Back to [facility] once stable and okayed by  Nephrology.         Ariane Reeves MD    D: 01/08/2021 16:49:42       T: 01/08/2021 16:59:35     [email or web address]  Job#: [id number]     Doc#: [id number]    CC:

## 2021-01-09 NOTE — PROGRESS NOTES
Admit Date: 1/7/2021       Subjective:  Chief Complaint   Patient presents with    Altered Mental Status     per EMS, family states that she \"talking strange\" [facility] is where she needs dialysis    Other     abnormal labs    Shoulder Pain     c/o L shoulder pain after therapy \"hugged her\" pt states they did an xray a couple days ago never heard anything back            Objective:      68 yo admitted with ckd and hyperkalemia. Has declined dialysis in past but now looks like she is agreeable. need to establish when dialysis will occur and if vasuclar consult needed now or as outpt    K+ decreaseing  4.5    Bun /cr 105/. 5.3 with baselin cr 3.2  Scheduled Meds:  Current Facility-Administered Medications   Medication Dose Route Frequency Provider Last Rate Last Admin    amLODIPine (NORVASC) tablet 10 mg  10 mg Oral Daily Elsa Rubio MD   10 mg at 01/09/21 6772    busPIRone (BUSPAR) tablet 5 mg  5 mg Oral TID Elsa Rubio MD   5 mg at 01/09/21 5575    escitalopram (LEXAPRO) tablet 10 mg  10 mg Oral Daily Elsa Rubio MD   10 mg at 01/09/21 0191    gabapentin (NEURONTIN) capsule 300 mg  300 mg Oral BID Elsa Rubio MD   300 mg at 01/09/21 0947    insulin glargine (LANTUS) injection vial 10 Units  10 Units Subcutaneous Nightly Elsa Rubio MD   10 Units at 01/08/21 2009    magnesium hydroxide (MILK OF MAGNESIA) 400 MG/5ML suspension 30 mL  30 mL Oral BID PRN Elsa Rubio MD        melatonin tablet 3 mg  3 mg Oral Nightly PRN Elsa Rubio MD        oxyCODONE-acetaminophen (PERCOCET) 5-325 MG per tablet 1 tablet  1 tablet Oral Q8H PRN Elsa Rubio MD   1 tablet at 01/08/21 2005    polyethylene glycol (GLYCOLAX) packet 17 g  17 g Oral Daily Elsa Rubio MD        sevelamer (RENVELA) tablet 800 mg  800 mg Oral TID  Elsa Rubio MD   800 mg at 01/09/21 9402    thiamine tablet 200 mg  200 mg Oral Daily Elsa Rubio MD   200 mg at 01/09/21 0559    ascorbic acid (VITAMIN C) tablet 500 mg 500 mg Oral Daily Alethea Barnhart MD   500 mg at 01/09/21 2976    vitamin D (CHOLECALCIFEROL) tablet 2,000 Units  2,000 Units Oral Daily Alethea Barnhart MD   2,000 Units at 01/09/21 0364    zinc sulfate (ZINCATE) capsule 50 mg  50 mg Oral Daily Alethea Barnhart MD   50 mg at 01/09/21 9205    alogliptin (NESINA) tablet 6.25 mg  6.25 mg Oral Daily Alethea Barnhart MD        metoprolol succinate (TOPROL XL) extended release tablet 50 mg  50 mg Oral BID Alethea Barnhart MD   50 mg at 01/09/21 7641    senna (SENOKOT) tablet 17.2 mg  2 tablet Oral BID Alethea Barnhart MD   17.2 mg at 01/08/21 2005    insulin lispro (HUMALOG) injection vial 10 Units  10 Units Subcutaneous TID  Alethea Barnhart MD        insulin lispro (HUMALOG) injection vial 0-6 Units  0-6 Units Subcutaneous TID [facility] Alethea Barnhart MD   1 Units at 01/08/21 1204    insulin lispro (HUMALOG) injection vial 0-3 Units  0-3 Units Subcutaneous Nightly Alethea Barnhart MD        prochlorperazine (COMPAZINE) tablet 10 mg  10 mg Oral Q6H PRN Alethea Barnhart MD        sodium zirconium cyclosilicate [facility]) oral suspension 5 g  5 g Oral Daily Alethea Barnhart MD   5 g at 01/08/21 1213    sodium chloride flush 0.9 % injection 10 mL  10 mL Intravenous PRN Alethea Barnhart MD   10 mL at 01/09/21 0939    glucose (GLUTOSE) 40 % oral gel 15 g  15 g Oral PRN Alethea Barnhart MD        dextrose 50 % IV solution  12.5 g Intravenous PRN Alethea Barnhart MD        glucagon (rDNA) injection 1 mg  1 mg Intramuscular PRN Alethea Barnhart MD        dextrose 5 % solution  100 mL/hr Intravenous PRN Alethea Barnhart MD           Continuous Infusions  :   dextrose         PRN Meds:  magnesium hydroxide, melatonin, oxyCODONE-acetaminophen, prochlorperazine, sodium chloride flush, glucose, dextrose, glucagon (rDNA), dextrose      Wt Readings from Last 3 Encounters:   01/08/21 194 lb 9.6 oz (88.3 kg)   11/19/20 202 lb 4.8 oz (91.8 kg)   10/12/20 220 lb 8 oz (100 kg)     No intake/output data recorded.   No intake or output data in the 24 hours ending 01/09/21 1013    Vitals:    01/09/21 0915   BP: 132/68   Pulse: 68   Resp: 18   Temp: 97.6 °F (36.4 °C)   SpO2: 96%       Physical Exam:    Skin:    Warm and dry. No rash or bruises    Neck:    No JVD, No thyromegaly, No carotid bruit  Cardiac:   RRR, No gallop or murmur  Lungs:   CTA,  No wheezes or rales,Normal percussion  Abdomen:  Normal bowel sounds, abd soft, non-tender, no rebound or guarding  Extremities:   No clubbing, edema or cyanosis  Neurological:   A & O x 3, Moves all extremities, normal DTR              CBC  Recent Labs     01/07/21  1721 01/09/21  0510   WBC 7.6 4.8   HGB 11.3* 9.5*   HCT 36.8 31.2*   .9* 104.0*    205     BMP  Recent Labs     01/07/21  1824 01/08/21  1013 01/09/21  0510    145 140   K 6.0* 4.9 4.5    110* 105   CO2 23 21* 23   * 101* 105*   CREATININE 5.6* 5.4* 5.8*   LABGLOM 7 8 7   CALCIUM 9.3 9.1 8.9     LFT's  Recent Labs     01/07/21  1824 01/09/21  0510   ALT 25 16     INR: No results for input(s): INR in the last 72 hours. Lab Results   Component Value Date    CRP 19.3 (H) 05/24/2020     Lab Results   Component Value Date    SEDRATE 45 (H) 08/13/2019     No results for input(s): POCGLU in the last 72 hours. Lipids: No results for input(s): CHOL, HDL in the last 72 hours.     Invalid input(s): LDLCALCU  ABGs:   Lab Results   Component Value Date    PO2ART 110.4 05/09/2020    COG6YKO 43.7 05/09/2020     SpO2 Readings from Last 1 Encounters:   01/09/21 96%       Last 3 Troponin:    Lab Results   Component Value Date    TROPONINI 0.09 10/09/2020    TROPONINI 0.11 05/21/2020    TROPONINI 0.12 04/11/2020     Lab Results   Component Value Date    CKTOTAL 36 05/24/2020    CKTOTAL 25 (L) 08/16/2013    CKTOTAL 28 (L) 08/15/2013    CKMB 1.0 08/15/2013    CKMB 3.9 06/26/2011    CKMB 3.0 06/26/2011    TROPONINI 0.09 (H) 10/09/2020    TROPONINI 0.11 (H) 05/21/2020    TROPONINI 0.12 (H) 04/11/2020        TSH:    Lab Results   Component Value Date    TSH 1.945 08/16/2013      Vent Information  SpO2: 96 %      U/A:     Lab Results   Component Value Date    COLORU Yellow 01/07/2021    PHUR 5.5 01/07/2021    WBCUA >20 01/07/2021    WBCUA 5-10 08/17/2011    RBCUA 5-10 01/07/2021    RBCUA 0-1 06/26/2013    YEAST Present 08/11/2020    BACTERIA MANY 01/07/2021    CLARITYU CLOUDY 01/07/2021    SPECGRAV 1.025 01/07/2021    LEUKOCYTESUR LARGE 01/07/2021    UROBILINOGEN 0.2 01/07/2021    BILIRUBINUR Negative 01/07/2021    BILIRUBINUR NEGATIVE 08/17/2011    BLOODU SMALL 01/07/2021    GLUCOSEU Negative 01/07/2021    GLUCOSEU NEGATIVE 08/17/2011    AMORPHOUS FEW 06/25/2011          Iron studies:  Lab Results   Component Value Date    FERRITIN 302 01/09/2021     Bone disease:  Lab Results   Component Value Date    PTH 61 11/19/2020    MG 2.9 (H) 01/09/2021    PHOS 7.3 (H) 01/09/2021     Nutrition:No results found for: ALB           Assessment:  Patient Active Problem List   Diagnosis Code    Neurologic gait dysfunction R26.9    Renal failure, acute on chronic (HCC) N17.9, N18.9    Diabetes mellitus ([facility] E11.9    Hypertension I10    Arthritis M19.90    Kidney disease N28.9    Acute blood loss anemia D62    Knee problem M25.9    Anemia due to stage 3 chronic kidney disease N18.30, D63.1    Primary osteoarthritis of both knees M17.0    Acute on chronic renal insufficiency N28.9, N18.9    Acute renal failure (HCC) P71.3    Metabolic acidosis P00.7    Acute renal failure superimposed on chronic kidney disease, on chronic dialysis (HCC) N17.9, N18.9, Z99.2    Sepsis ([facility] A41.9    2019 novel coronavirus disease (COVID-19) U07.1    GI bleed K92.2    Moderate protein-calorie malnutrition (HCC) E44.0    Hyperkalemia E87.5    PERLA (acute kidney injury) ([address] N17.9     1.  ckd 5   Needs vascular access   prefer having an AVF placed rather than TDC if can control the K+    2. Hyperkalemia   On lokelma    3.   Anemia ckd      4.  htn    5.  dm2     Lab Results Component Value Date    LABA1C 6.9 (H) 05/18/2020                Plan:      Discharge to [facility]  when ok with renal  Not sure if vascular access now or as Mark Fisher M.D.    1/9/2021

## 2021-01-09 NOTE — PROGRESS NOTES
Education/Counseling:  Education not indicated   Coordination of Nutrition Care:  Continue to monitor while inpatient    Goals:  pt to consume >75% meals/ONS       Nutrition Monitoring and Evaluation:   Food/Nutrient Intake Outcomes:  Food and Nutrient Intake, Supplement Intake  Physical Signs/Symptoms Outcomes:  Biochemical Data, GI Status, Fluid Status or Edema, Nutrition Focused Physical Findings, Skin, Weight     Discharge Planning:     Too soon to determine     Electronically signed by Alfreda Robbins MS, RD, LD on 1/9/21 at 1:24 PM EST    Contact: [phone]

## 2021-01-10 VITALS
SYSTOLIC BLOOD PRESSURE: 114 MMHG | BODY MASS INDEX: 36.91 KG/M2 | HEART RATE: 62 BPM | TEMPERATURE: 97.4 F | WEIGHT: 188 LBS | OXYGEN SATURATION: 98 % | RESPIRATION RATE: 16 BRPM | HEIGHT: 60 IN | DIASTOLIC BLOOD PRESSURE: 53 MMHG

## 2021-01-10 LAB
ALBUMIN SERPL-MCNC: 2.9 G/DL (ref 3.5–5.2)
ALP BLD-CCNC: 95 U/L (ref 35–104)
ALT SERPL-CCNC: 14 U/L (ref 0–32)
ANION GAP SERPL CALCULATED.3IONS-SCNC: 10 MMOL/L (ref 7–16)
AST SERPL-CCNC: 12 U/L (ref 0–31)
BILIRUB SERPL-MCNC: <0.2 MG/DL (ref 0–1.2)
BUN BLDV-MCNC: 102 MG/DL (ref 8–23)
CALCIUM SERPL-MCNC: 9.1 MG/DL (ref 8.6–10.2)
CHLORIDE BLD-SCNC: 109 MMOL/L (ref 98–107)
CO2: 24 MMOL/L (ref 22–29)
CREAT SERPL-MCNC: 5.4 MG/DL (ref 0.5–1)
GFR AFRICAN AMERICAN: 9
GFR NON-AFRICAN AMERICAN: 8 ML/MIN/1.73
GLUCOSE BLD-MCNC: 90 MG/DL (ref 74–99)
HCT VFR BLD CALC: 31.8 % (ref 34–48)
HEMOGLOBIN: 9.4 G/DL (ref 11.5–15.5)
MAGNESIUM: 3 MG/DL (ref 1.6–2.6)
MCH RBC QN AUTO: 30.2 PG (ref 26–35)
MCHC RBC AUTO-ENTMCNC: 29.6 % (ref 32–34.5)
MCV RBC AUTO: 102.3 FL (ref 80–99.9)
METER GLUCOSE: 128 MG/DL (ref 74–99)
METER GLUCOSE: 170 MG/DL (ref 74–99)
METER GLUCOSE: 51 MG/DL (ref 74–99)
METER GLUCOSE: 72 MG/DL (ref 74–99)
PDW BLD-RTO: 13 FL (ref 11.5–15)
PHOSPHORUS: 6.9 MG/DL (ref 2.5–4.5)
PLATELET # BLD: 219 E9/L (ref 130–450)
PMV BLD AUTO: 9.4 FL (ref 7–12)
POTASSIUM SERPL-SCNC: 4.6 MMOL/L (ref 3.5–5)
RBC # BLD: 3.11 E12/L (ref 3.5–5.5)
SODIUM BLD-SCNC: 143 MMOL/L (ref 132–146)
TOTAL PROTEIN: 6.6 G/DL (ref 6.4–8.3)
URINE CULTURE, ROUTINE: NORMAL
WBC # BLD: 5.6 E9/L (ref 4.5–11.5)

## 2021-01-10 PROCEDURE — 83735 ASSAY OF MAGNESIUM: CPT

## 2021-01-10 PROCEDURE — 82962 GLUCOSE BLOOD TEST: CPT

## 2021-01-10 PROCEDURE — G0378 HOSPITAL OBSERVATION PER HR: HCPCS

## 2021-01-10 PROCEDURE — 85027 COMPLETE CBC AUTOMATED: CPT

## 2021-01-10 PROCEDURE — 84100 ASSAY OF PHOSPHORUS: CPT

## 2021-01-10 PROCEDURE — 36415 COLL VENOUS BLD VENIPUNCTURE: CPT

## 2021-01-10 PROCEDURE — 6370000000 HC RX 637 (ALT 250 FOR IP): Performed by: FAMILY MEDICINE

## 2021-01-10 PROCEDURE — 80053 COMPREHEN METABOLIC PANEL: CPT

## 2021-01-10 PROCEDURE — 99217 PR OBSERVATION CARE DISCHARGE MANAGEMENT: CPT | Performed by: FAMILY MEDICINE

## 2021-01-10 PROCEDURE — 2580000003 HC RX 258: Performed by: FAMILY MEDICINE

## 2021-01-10 RX ADMIN — SENNOSIDES 17.2 MG: 8.6 TABLET, FILM COATED ORAL at 08:03

## 2021-01-10 RX ADMIN — ESCITALOPRAM 10 MG: 10 TABLET, FILM COATED ORAL at 08:04

## 2021-01-10 RX ADMIN — GABAPENTIN 300 MG: 300 CAPSULE ORAL at 08:04

## 2021-01-10 RX ADMIN — AMLODIPINE BESYLATE 10 MG: 10 TABLET ORAL at 08:04

## 2021-01-10 RX ADMIN — SEVELAMER CARBONATE 800 MG: 800 TABLET, FILM COATED ORAL at 16:15

## 2021-01-10 RX ADMIN — POLYETHYLENE GLYCOL 3350 17 G: 17 POWDER, FOR SOLUTION ORAL at 08:05

## 2021-01-10 RX ADMIN — OXYCODONE HYDROCHLORIDE AND ACETAMINOPHEN 500 MG: 500 TABLET ORAL at 08:03

## 2021-01-10 RX ADMIN — BUSPIRONE HYDROCHLORIDE 5 MG: 5 TABLET ORAL at 14:19

## 2021-01-10 RX ADMIN — INSULIN LISPRO 1 UNITS: 100 INJECTION, SOLUTION INTRAVENOUS; SUBCUTANEOUS at 06:15

## 2021-01-10 RX ADMIN — Medication 2000 UNITS: at 08:04

## 2021-01-10 RX ADMIN — ALOGLIPTIN 6.25 MG: 6.25 TABLET, FILM COATED ORAL at 08:04

## 2021-01-10 RX ADMIN — OXYCODONE HYDROCHLORIDE AND ACETAMINOPHEN 1 TABLET: 5; 325 TABLET ORAL at 06:19

## 2021-01-10 RX ADMIN — METOPROLOL SUCCINATE 50 MG: 50 TABLET, EXTENDED RELEASE ORAL at 08:04

## 2021-01-10 RX ADMIN — INSULIN LISPRO 10 UNITS: 100 INJECTION, SOLUTION INTRAVENOUS; SUBCUTANEOUS at 08:05

## 2021-01-10 RX ADMIN — SODIUM ZIRCONIUM CYCLOSILICATE 5 G: 5 POWDER, FOR SUSPENSION ORAL at 12:28

## 2021-01-10 RX ADMIN — BUSPIRONE HYDROCHLORIDE 5 MG: 5 TABLET ORAL at 08:04

## 2021-01-10 RX ADMIN — SEVELAMER CARBONATE 800 MG: 800 TABLET, FILM COATED ORAL at 08:04

## 2021-01-10 RX ADMIN — SEVELAMER CARBONATE 800 MG: 800 TABLET, FILM COATED ORAL at 11:24

## 2021-01-10 RX ADMIN — ZINC SULFATE 220 MG (50 MG) CAPSULE 50 MG: CAPSULE at 08:04

## 2021-01-10 RX ADMIN — SODIUM CHLORIDE, PRESERVATIVE FREE 10 ML: 5 INJECTION INTRAVENOUS at 08:05

## 2021-01-10 RX ADMIN — Medication 200 MG: at 08:03

## 2021-01-10 ASSESSMENT — PAIN SCALES - GENERAL
PAINLEVEL_OUTOF10: 6
PAINLEVEL_OUTOF10: 0

## 2021-01-10 NOTE — PROGRESS NOTES
ABDOMEN SURGERY N/A 5/4/2020    SACRAL WOUND DEBRIDEMENT CALL  WITH TIME AVAIL AM performed by Summer Payne MD at [facility] [address]  x3   [address]  3/31/16    Laparoscopic-Dr. Malinda Potts    CYSTOSCOPY  2011     for kidney stones    FOOT SURGERY  2009     right     UPPER GASTROINTESTINAL ENDOSCOPY  2.18.15    Dr. Dwight Veliz Findings: Mild Gastrits and Duodenitis, 2cm Hiatal Hernia    UPPER GASTROINTESTINAL ENDOSCOPY N/A 5/8/2020    EGD CONTROL HEMORRHAGE performed by Summer Payne MD at [address] N/A 5/22/2020    EGD BEDSIDE performed by Jennie Iverson MD at [address]       Family History   Problem Relation Age of Onset    Cancer Sister         reports that she quit smoking about 9 years ago. She has a 30.00 pack-year smoking history. She has never used smokeless tobacco. She reports that she does not drink alcohol or use drugs. Allergies:  Patient has no known allergies.     Current Medications:        amLODIPine (NORVASC) tablet 10 mg, Daily      busPIRone (BUSPAR) tablet 5 mg, TID      escitalopram (LEXAPRO) tablet 10 mg, Daily      gabapentin (NEURONTIN) capsule 300 mg, BID      insulin glargine (LANTUS) injection vial 10 Units, Nightly      magnesium hydroxide (MILK OF MAGNESIA) 400 MG/5ML suspension 30 mL, BID PRN      melatonin tablet 3 mg, Nightly PRN      oxyCODONE-acetaminophen (PERCOCET) 5-325 MG per tablet 1 tablet, Q8H PRN      polyethylene glycol (GLYCOLAX) packet 17 g, Daily      sevelamer (RENVELA) tablet 800 mg, TID WC      thiamine tablet 200 mg, Daily      ascorbic acid (VITAMIN C) tablet 500 mg, Daily      vitamin D (CHOLECALCIFEROL) tablet 2,000 Units, Daily      zinc sulfate (ZINCATE) capsule 50 mg, Daily      alogliptin (NESINA) tablet 6.25 mg, Daily      metoprolol succinate (TOPROL XL) extended release tablet 50 mg, BID      senna (SENOKOT) tablet 17.2 mg, BID      insulin lispro (HUMALOG) injection vial 10 Units, TID WC      insulin lispro (HUMALOG) injection vial 0-6 Units, TID WC      insulin lispro (HUMALOG) injection vial 0-3 Units, Nightly      prochlorperazine (COMPAZINE) tablet 10 mg, Q6H PRN      sodium zirconium cyclosilicate (LOKELMA) oral suspension 5 g, Daily      sodium chloride flush 0.9 % injection 10 mL, PRN      glucose (GLUTOSE) 40 % oral gel 15 g, PRN      dextrose 50 % IV solution, PRN      glucagon (rDNA) injection 1 mg, PRN      dextrose 5 % solution, PRN        Review of Systems:   Pertinent items are noted in HPI. Remainder of a complete review of systems is (-) other than stated in the HPI    Physical exam:   Constitutional:  Elderly female in NAD  Vitals: VITALS:  BP (!) 122/51   Pulse 61   Temp 97.4 °F (36.3 °C) (Oral)   Resp 16   Ht 5' (1.524 m)   Wt 188 lb (85.3 kg)   SpO2 98%   BMI 36.72 kg/m²   24HR INTAKE/OUTPUT:      Intake/Output Summary (Last 24 hours) at 1/10/2021 1103  Last data filed at 1/10/2021 1045  Gross per 24 hour   Intake 240 ml   Output 800 ml   Net -560 ml     URINARY CATHETER OUTPUT ([place]):     DRAIN/TUBE OUTPUT:     VENT SETTINGS:  Vent Information  SpO2: 98 %  Additional Respiratory  Assessments  Pulse: 61  Resp: 16  SpO2: 98 %    Skin: no rash on exposed skin, turgor wnl  Neck: no  jvd noted  Cardiovascular: RRR, no rub  Respiratory: clear upper, diminished lower, no adventitious sounds   Abdomen:  +bs, soft, nt, nd  Ext: ++ bilat  lower extremity edema  Psychiatric: mood and affect appropriate, cr nr 2-12 grossly intact      Data:   Labs:  CBC:   Lab Results   Component Value Date    WBC 5.6 01/10/2021    RBC 3.11 01/10/2021    HGB 9.4 01/10/2021    HCT 31.8 01/10/2021    .3 01/10/2021    MCH 30.2 01/10/2021    MCHC 29.6 01/10/2021    RDW 13.0 01/10/2021     01/10/2021    MPV 9.4 01/10/2021     CBC with Differential:    Lab Results   Component Value Date    WBC 5.6 01/10/2021    RBC 3.11 01/10/2021    HGB 9.4 01/10/2021 HCT 31.8 01/10/2021     01/10/2021    .3 01/10/2021    MCH 30.2 01/10/2021    MCHC 29.6 01/10/2021    RDW 13.0 01/10/2021    NRBC 0.9 05/25/2020    SEGSPCT 71 08/16/2013    BANDSPCT 1 03/29/2016    METASPCT 0.9 05/10/2020    LYMPHOPCT 12.7 01/07/2021    PROMYELOPCT 0.9 05/09/2020    MONOPCT 12.4 01/07/2021    MYELOPCT 1.7 05/11/2020    BASOPCT 0.7 01/07/2021    MONOSABS 0.95 01/07/2021    LYMPHSABS 0.97 01/07/2021    EOSABS 0.32 01/07/2021    BASOSABS 0.05 01/07/2021     Hemoglobin/Hematocrit:    Lab Results   Component Value Date    HGB 9.4 01/10/2021    HCT 31.8 01/10/2021     CMP:    Lab Results   Component Value Date     01/10/2021    K 4.6 01/10/2021    K 5.3 11/19/2020     01/10/2021    CO2 24 01/10/2021     01/10/2021    CREATININE 5.4 01/10/2021    GFRAA 9 01/10/2021    LABGLOM 8 01/10/2021    GLUCOSE 90 01/10/2021    GLUCOSE 149 10/12/2011    PROT 6.6 01/10/2021    LABALBU 2.9 01/10/2021    LABALBU 4.1 10/12/2011    CALCIUM 9.1 01/10/2021    BILITOT <0.2 01/10/2021    ALKPHOS 95 01/10/2021    AST 12 01/10/2021    ALT 14 01/10/2021     BMP:    Lab Results   Component Value Date     01/10/2021    K 4.6 01/10/2021    K 5.3 11/19/2020     01/10/2021    CO2 24 01/10/2021     01/10/2021    LABALBU 2.9 01/10/2021    LABALBU 4.1 10/12/2011    CREATININE 5.4 01/10/2021    CALCIUM 9.1 01/10/2021    GFRAA 9 01/10/2021    LABGLOM 8 01/10/2021    GLUCOSE 90 01/10/2021    GLUCOSE 149 10/12/2011     BUN/Creatinine:    Lab Results   Component Value Date     01/10/2021    CREATININE 5.4 01/10/2021     Hepatic Function Panel:    Lab Results   Component Value Date    ALKPHOS 95 01/10/2021    ALT 14 01/10/2021    AST 12 01/10/2021    PROT 6.6 01/10/2021    BILITOT <0.2 01/10/2021    BILIDIR <0.2 01/07/2021    IBILI see below 01/07/2021    LABALBU 2.9 01/10/2021    LABALBU 4.1 10/12/2011     Albumin:    Lab Results   Component Value Date    LABALBU 2.9 01/10/2021 LABALBU 4.1 10/12/2011     Calcium:    Lab Results   Component Value Date    CALCIUM 9.1 01/10/2021     Ionized Calcium:    Lab Results   Component Value Date    IONCA 1.59 06/26/2013     Magnesium:    Lab Results   Component Value Date    MG 3.0 01/10/2021     Phosphorus:    Lab Results   Component Value Date    PHOS 6.9 01/10/2021     LDH:    Lab Results   Component Value Date     05/24/2020     Uric Acid:    Lab Results   Component Value Date    LABURIC 7.7 07/19/2019    URICACID 11.6 10/12/2011     PT/INR:    Lab Results   Component Value Date    PROTIME 11.3 05/27/2020    PROTIME 15.3 06/25/2011    INR 1.0 05/27/2020     PTT:    Lab Results   Component Value Date    APTT 24.6 05/21/2020   [APTT}  Troponin:    Lab Results   Component Value Date    TROPONINI 0.09 10/09/2020     U/A:    Lab Results   Component Value Date    COLORU Yellow 01/07/2021    PROTEINU >=300 01/07/2021    PHUR 5.5 01/07/2021    WBCUA >20 01/07/2021    WBCUA 5-10 08/17/2011    RBCUA 5-10 01/07/2021    RBCUA 0-1 06/26/2013    YEAST Present 08/11/2020    BACTERIA MANY 01/07/2021    CLARITYU CLOUDY 01/07/2021    SPECGRAV 1.025 01/07/2021    LEUKOCYTESUR LARGE 01/07/2021    UROBILINOGEN 0.2 01/07/2021    BILIRUBINUR Negative 01/07/2021    BILIRUBINUR NEGATIVE 08/17/2011    BLOODU SMALL 01/07/2021    GLUCOSEU Negative 01/07/2021    GLUCOSEU NEGATIVE 08/17/2011    AMORPHOUS FEW 06/25/2011     ABG:    Lab Results   Component Value Date    PH 7.433 05/10/2020    PH 7.377 06/27/2011    PCO2 31.0 05/10/2020    PO2 106.3 05/10/2020    HCO3 20.3 05/10/2020    BE -3.2 05/10/2020    O2SAT 97.5 05/10/2020     Microalbumen/Creatinine ratio:  No components found for: RUCREAT  FLP:    Lab Results   Component Value Date    TRIG 181 10/12/2011    HDL 37.0 10/12/2011    LDLCALC 118 10/12/2011     TSH:    Lab Results   Component Value Date    TSH 1.945 08/16/2013     VITAMIN B12: No components found for: B12  FOLATE:    Lab Results   Component Value Date

## 2021-01-11 NOTE — DISCHARGE SUMMARY
Physician Discharge Summary     Patient ID:  Rory Kennedy  [id number]  25 y.o.  1950    Admit date: 1/7/2021    Discharge date and time: 1/10/2021  7:51 PM     Admitting Physician: Orlando Bond MD     Discharge Physician: Oscar Dobbins    Consults: nephrology and vascular surgery    Admission Diagnoses:   Hyperkalemia [E87.5]    Discharge Diagnoses:     Patient Active Problem List   Diagnosis    Neurologic gait dysfunction    [facility]al failure, acute on chronic ([facility]    Diabetes mellitus ([facility]    Hypertension    Arthritis    Kidney disease    Acute blood loss anemia    Knee problem    Anemia due to stage 3 chronic kidney disease    Primary osteoarthritis of both knees    Acute on chronic renal insufficiency    Acute renal failure (HCC)    Metabolic acidosis    Acute renal failure superimposed on chronic kidney disease, on chronic dialysis ([facility]    Sepsis ([facility]    2019 novel coronavirus disease (COVID-19)    GI bleed    Moderate protein-calorie malnutrition (HCC)    Hyperkalemia    PERLA (acute kidney [facility]) [facility])       Hospital Course: *worsening renal failure with hyperkalemia. The patient has always had a discussion about hemodialysis as has declined in the past, but states she is now ready to do this. However renal feels that she is not there yet. K+ was elevated on admission but decreased to 4.5 from 6.0. She was given lokelma for this. Vascular will do fistula when needed as outpt. Pt discharged to  ecf.     Discharged Condition: fair    Significant Diagnostic Studies: labs: , microbiology:  and radiology: CXR: .    Treatments: IV hydration    Disposition: SNF    Patient Instructions:     Discharge Medication List as of 1/10/2021  3:26 PM      CONTINUE these medications which have CHANGED    Details   sodium zirconium cyclosilicate (LOKELMA) 5 g PACK oral suspension Take 5 g by mouth daily, Disp-30 packet, R-1NO PRINT         CONTINUE these medications which have NOT CHANGED Details   furosemide (LASIX) 40 MG tablet Take 40 mg by mouth dailyHistorical Med      !! insulin lispro (HUMALOG) 100 UNIT/ML injection vial Inject 10 Units into the skin 3 times daily (before meals)Historical Med      sevelamer (RENVELA) 800 MG tablet Take 1 tablet by mouth 3 times daily (with meals), Disp-90 tablet,R-3DC to SNF      Vitamin D (CHOLECALCIFEROL) 50 MCG (2000 UT) TABS tablet Take 1 tablet by mouth daily, Disp-60 tabletLabeling may look different. 25 mcg=1000 Units. Please double check dosages. DC to SNF      Nutritional Supplements (BLESSING PO) Take by mouth 2 times dailyHistorical Med      Nutritional Supplements (GLUCERNA PO) Take by mouth 3 times dailyHistorical Med      SITagliptin (JANUVIA) 25 MG tablet Take 25 mg by mouth dailyHistorical Med      Multiple Vitamins-Minerals (THERAPEUTIC MULTIVITAMIN-MINERALS) tablet Take 1 tablet by mouth dailyHistorical Med      vitamin C (ASCORBIC ACID) 500 MG tablet Take 500 mg by mouth dailyHistorical Med      zinc sulfate (ZINCATE) 220 (50 Zn) MG capsule Take 220 mg by mouth daily Historical Med      metoprolol succinate (TOPROL XL) 25 MG extended release tablet Take 1 tablet by mouth daily, Disp-30 tablet,R-3DC to SNF      epoetin derek-epbx (RETACRIT) 4000 UNIT/ML SOLN injection Inject 2 mLs into the skin three times a week, Disp-16.5 mLDC to SNF      ferrous sulfate (IRON 325) 325 (65 Fe) MG tablet Take 1 tablet by mouth daily (with breakfast), Disp-30 tablet,R-3DC to SNF      escitalopram (LEXAPRO) 5 MG tablet Take 10 mg by mouth dailyHistorical Med      gabapentin (NEURONTIN) 300 MG capsule Take 300 mg by mouth 2 times daily.  Historical Med      !! insulin lispro (HUMALOG) 100 UNIT/ML injection vial Inject 0-10 Units into the skin 3 times daily (before meals) *Per Sliding Scale*    - 199  = give 2 units   - 249  = give 4 units   - 299  = give 6 units   - 349  = give 8 units   - 400 =  give 10 units  BS < 70 or > 400 toan l

## 2021-01-26 ENCOUNTER — TELEPHONE (OUTPATIENT)
Dept: VASCULAR SURGERY | Age: 71
End: 2021-01-26

## 2021-02-16 ENCOUNTER — TELEPHONE (OUTPATIENT)
Dept: VASCULAR SURGERY | Age: 71
End: 2021-02-16

## 2021-02-16 NOTE — TELEPHONE ENCOUNTER
Called and confirmed appointment for 2/17/21 at [phone] with [facility] staff.

## 2021-02-17 ENCOUNTER — HOSPITAL ENCOUNTER (EMERGENCY)
Age: 71
Discharge: HOME | End: 2021-02-17
Attending: EMERGENCY MEDICINE
Payer: MEDICARE

## 2021-02-17 VITALS
TEMPERATURE: 97.7 F | OXYGEN SATURATION: 98 % | RESPIRATION RATE: 18 BRPM | BODY MASS INDEX: 34.93 KG/M2 | HEART RATE: 88 BPM | HEIGHT: 61 IN | DIASTOLIC BLOOD PRESSURE: 83 MMHG | SYSTOLIC BLOOD PRESSURE: 120 MMHG | WEIGHT: 185 LBS
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
## 2021-02-17 PROCEDURE — 99283 EMERGENCY DEPT VISIT LOW MDM: CPT

## 2021-02-17 NOTE — CARE COORDINATION
Social Work/ Discharge Planning:    Pt is discharged and will return to [place] via [place]. ETA 90mins. Ambulance form complete and in pt's chart. Charge RN awre.

## 2021-02-17 NOTE — ED PROVIDER NOTES
Department of Emergency Medicine   ED  Provider Note  Admit Date/RoomTime: 2/17/2021 10:35 AM  ED Room: [facility]14B-          History of Present Illness:  2/17/21, Time: 10:43 AM EST  Chief Complaint   Patient presents with    Fall     @ wound care across the street,slipped out of w/c outside with [facility] staff and sat on ground x15 mins waiting for EMS; denies new pain;,no chest pain/ shortness of breath                Ena Esteves is a 69 y.o. female presenting to the ED for fall from wheelchair, beginning less than 30 minutes ago. Patient was going to wound care center, sacral ulcer being cared for at the center, when she slipped out of the wheelchair upon arrival.  Patient stated that she was uninjured, EMS stated that she appeared hypothermic and was complaining that she was cold. Patient was transferred to the ED. She denies having any pain or discomfort, denies any neck or back pain, no headache. She did not strike her head nor did she lose consciousness. Patient stated that she did not want to come to the hospital but states that EMS was rather insistent because she was cold.     Review of Systems:   Pertinent positives and negatives are stated within HPI, all other systems reviewed and are negative.        --------------------------------------------- PAST HISTORY ---------------------------------------------  Past Medical History:  has a past medical history of Anemia, Anxiety, Arthritis, Chronic knee pain, Chronic pain syndrome, CKD (chronic kidney disease) stage 4, GFR 15-29 ml/min ([facility]), Depression, Diabetes mellitus ([address] Dysphagia, oral phase, Gall stones, Generalized muscle weakness, Hypertension, Hypertensive kidney disease with stage 4 chronic kidney disease ([address] Kidney disease, Kidney stones, Obesity, Protein calorie malnutrition ([address] Sacral pressure ulcer, Sarcoidosis, SOBOE (shortness of breath on exertion), Tremor, Unspecified osteoarthritis, unspecified site, Urinary anomaly, and UTI (urinary tract infection). Past Surgical History:  has a past surgical history that includes Foot surgery (2009 ); Cystocopy (2011 );  section (x3); Upper gastrointestinal endoscopy (2.18.15); Cholecystectomy (3/31/16); Abdomen surgery (N/A, 2020); Upper gastrointestinal endoscopy (N/A, 2020); and Upper gastrointestinal endoscopy (N/A, 2020). Social History:  reports that she quit smoking about 9 years ago. She has a 30.00 pack-year smoking history. She has never used smokeless tobacco. She reports that she does not drink alcohol or use drugs. Family History: family history includes Cancer in her sister. . Unless otherwise noted, family history is non contributory    The patients home medications have been reviewed. Allergies: Patient has no known allergies. ---------------------------------------------------PHYSICAL EXAM--------------------------------------    Constitutional/General: Alert and oriented x3  Head: Normocephalic and atraumatic  Eyes: PERRL, EOMI, sclera non icteric  Mouth: Oropharynx clear, handling secretions, no trismus, no asymmetry of the posterior oropharynx or uvular edema  Neck: Supple, full ROM, no stridor, no meningeal signs  Respiratory: Lungs clear to auscultation bilaterally, no wheezes, rales, or rhonchi. Not in respiratory distress  Cardiovascular:  Regular rate. Regular rhythm. No murmurs, no aortic murmurs, no gallops, or rubs. 2+ distal pulses. Equal extremity pulses. Chest: No chest wall tenderness  GI:  Abdomen Soft, Non tender, Non distended. No rebound, guarding, or rigidity. No pulsatile masses. Musculoskeletal: Moves all extremities x 4. Warm and well perfused, no clubbing, cyanosis, or edema. Capillary refill <3 seconds  Integument: skin warm and dry. No rashes.    Neurologic: GCS 15, no focal deficits, symmetric strength 5/5 in the upper and lower extremities bilaterally  Psychiatric: Normal Affect          -------------------------------------------------- RESULTS -------------------------------------------------  I have personally reviewed all laboratory and imaging results for this patient. Results are listed below. LABS: (Lab results interpreted by me)  No results found for this visit on 02/17/21.,       RADIOLOGY:  Interpreted by Radiologist unless otherwise specified  XR PELVIS (1-2 VIEWS)    (Results Pending)   XR LUMBAR SPINE (2-3 VIEWS)    (Results Pending)         ------------------------- NURSING NOTES AND VITALS REVIEWED ---------------------------   The nursing notes within the ED encounter and vital signs as below have been reviewed by myself  /83   Pulse 88   Temp 97.7 °F (36.5 °C) (Tympanic)   Resp 18   Ht 5' 1\" (1.549 m)   Wt 185 lb (83.9 kg)   SpO2 98%   BMI 34.96 kg/m²     Oxygen Saturation Interpretation: Normal    The patients available past medical records and past encounters were reviewed. ------------------------------ ED COURSE/MEDICAL DECISION MAKING----------------------  Medications - No data to display        The cardiac monitor revealed sinus rhythm with a heart rate in the 80s as interpreted by me. The cardiac monitor was ordered secondary to the patient's chest pain and to monitor for patient for dysrhythmia. CPT 35814           Medical Decision Making:     I, Dr. Yaa De La Cruz, am the primary provider of record    69-year-old female presenting for evaluation after sliding out of wheelchair. EMS felt the patient was hypothermic, she arrives 97.7F, well-appearing and hemodynamically stable. She denies having any pain or discomfort. Lumbar x-rays and pelvis x-ray were ordered but patient is refusing any care. She is requesting discharge home. She is not hypothermic, she has no bony tenderness, understands that occult fracture is possible which can lead to more disability and pain.   She understands the risks involved with not having appropriate imaging. Will be discharged with PCP follow-up, strict instruction to return for any changes in condition or new symptoms. Re-Evaluations: This patient's ED course included: a personal history and physicial examination    This patient has remained hemodynamically stable during their ED course. Counseling: The emergency provider has spoken with the patient and discussed todays results, in addition to providing specific details for the plan of care and counseling regarding the diagnosis and prognosis. Questions are answered at this time and they are agreeable with the plan.       --------------------------------- IMPRESSION AND DISPOSITION ---------------------------------    IMPRESSION  1. Fall, initial encounter        DISPOSITION  Disposition: Discharge to home  Patient condition is stable        NOTE: This report was transcribed using voice recognition software.  Every effort was made to ensure accuracy; however, inadvertent computerized transcription errors may be present        Nilesh Hernandez DO  02/17/21 1111

## 2021-03-19 ENCOUNTER — HOSPITAL ENCOUNTER (OUTPATIENT)
Age: 71
Discharge: HOME | End: 2021-03-19
Payer: MEDICARE

## 2021-03-19 DIAGNOSIS — N18.4 STAGE 4 CHRONIC KIDNEY DISEASE (HCC): ICD-10-CM

## 2021-03-19 LAB
ABO/RH: NORMAL
ANTIBODY SCREEN: NORMAL

## 2021-03-19 PROCEDURE — 36415 COLL VENOUS BLD VENIPUNCTURE: CPT

## 2021-03-19 PROCEDURE — 86923 COMPATIBILITY TEST ELECTRIC: CPT

## 2021-03-19 PROCEDURE — 86901 BLOOD TYPING SEROLOGIC RH(D): CPT

## 2021-03-19 PROCEDURE — 86850 RBC ANTIBODY SCREEN: CPT

## 2021-03-19 PROCEDURE — 86900 BLOOD TYPING SEROLOGIC ABO: CPT

## 2021-03-22 ENCOUNTER — HOSPITAL ENCOUNTER (OUTPATIENT)
Dept: INFUSION THERAPY | Age: 71
Setting detail: INFUSION SERIES
Discharge: HOME | End: 2021-03-22
Payer: MEDICARE

## 2021-03-22 VITALS
SYSTOLIC BLOOD PRESSURE: 149 MMHG | TEMPERATURE: 97.8 F | HEART RATE: 71 BPM | DIASTOLIC BLOOD PRESSURE: 81 MMHG | RESPIRATION RATE: 18 BRPM | OXYGEN SATURATION: 98 %

## 2021-03-22 LAB
BLOOD BANK DISPENSE STATUS: NORMAL
BLOOD BANK PRODUCT CODE: NORMAL
BPU ID: NORMAL
DESCRIPTION BLOOD BANK: NORMAL

## 2021-03-22 PROCEDURE — 36430 TRANSFUSION BLD/BLD COMPNT: CPT

## 2021-03-22 PROCEDURE — P9016 RBC LEUKOCYTES REDUCED: HCPCS

## 2021-03-22 PROCEDURE — 2580000003 HC RX 258

## 2021-03-22 RX ORDER — SODIUM CHLORIDE 0.9 % (FLUSH) 0.9 %
10 SYRINGE (ML) INJECTION PRN
Status: DISCONTINUED | OUTPATIENT
Start: 2021-03-22 | End: 2021-03-23 | Stop reason: HOSPADM

## 2021-03-22 RX ORDER — SODIUM CHLORIDE 9 MG/ML
INJECTION, SOLUTION INTRAVENOUS PRN
Status: DISCONTINUED | OUTPATIENT
Start: 2021-03-22 | End: 2021-03-23 | Stop reason: HOSPADM

## 2021-03-22 RX ORDER — SODIUM CHLORIDE 0.9 % (FLUSH) 0.9 %
SYRINGE (ML) INJECTION
Status: COMPLETED
Start: 2021-03-22 | End: 2021-03-22

## 2021-03-22 RX ADMIN — Medication 10 ML: at 08:15

## 2021-03-22 RX ADMIN — SODIUM CHLORIDE, PRESERVATIVE FREE 10 ML: 5 INJECTION INTRAVENOUS at 08:15

## 2021-03-22 ASSESSMENT — PAIN SCALES - GENERAL: PAINLEVEL_OUTOF10: 7

## 2021-03-22 ASSESSMENT — PAIN - FUNCTIONAL ASSESSMENT: PAIN_FUNCTIONAL_ASSESSMENT: PREVENTS OR INTERFERES SOME ACTIVE ACTIVITIES AND ADLS

## 2021-03-22 ASSESSMENT — PAIN DESCRIPTION - PROGRESSION: CLINICAL_PROGRESSION: NOT CHANGED

## 2021-03-22 ASSESSMENT — PAIN DESCRIPTION - ONSET: ONSET: ON-GOING

## 2021-03-22 ASSESSMENT — PAIN DESCRIPTION - LOCATION: LOCATION: BUTTOCKS;COCCYX;GENERALIZED

## 2021-03-22 ASSESSMENT — PAIN DESCRIPTION - FREQUENCY: FREQUENCY: CONTINUOUS

## 2021-03-22 ASSESSMENT — PAIN DESCRIPTION - DESCRIPTORS: DESCRIPTORS: CONSTANT;DISCOMFORT;ACHING

## 2021-03-22 ASSESSMENT — PAIN DESCRIPTION - PAIN TYPE: TYPE: CHRONIC PAIN

## 2021-03-22 NOTE — PROGRESS NOTES
I have discussed with the patient the rationale for blood transfusion, its benefits in treating or preventing symptomatic anemia which could lead to fatigue, organ damage or death, and its risk which include: mild transfusion reactions, rare risk of blood borne infection, or more serious but rare allergic reactions. I have discussed the alternatives to transfusion, including the risk and consequences of not receiving transfusion. The patient had an opportunity to ask questions and had agreed to proceed with transfusion of packed red blood cells.

## 2021-04-14 ENCOUNTER — OFFICE VISIT (OUTPATIENT)
Dept: VASCULAR SURGERY | Age: 71
End: 2021-04-14
Payer: MEDICARE

## 2021-04-14 VITALS — HEIGHT: 61 IN | WEIGHT: 190 LBS | BODY MASS INDEX: 35.87 KG/M2

## 2021-04-14 DIAGNOSIS — N18.6 ENCOUNTER REGARDING VASCULAR ACCESS FOR DIALYSIS FOR ESRD (HCC): ICD-10-CM

## 2021-04-14 DIAGNOSIS — Z99.2 ENCOUNTER REGARDING VASCULAR ACCESS FOR DIALYSIS FOR ESRD (HCC): ICD-10-CM

## 2021-04-14 DIAGNOSIS — R21 SKIN RASH: Primary | ICD-10-CM

## 2021-04-14 PROCEDURE — 99214 OFFICE O/P EST MOD 30 MIN: CPT | Performed by: PHYSICIAN ASSISTANT

## 2021-04-14 RX ORDER — NYSTATIN 100000 [USP'U]/G
POWDER TOPICAL
Qty: 1 BOTTLE | Refills: 0 | Status: SHIPPED | OUTPATIENT
Start: 2021-04-14 | End: 2021-05-03 | Stop reason: ALTCHOICE

## 2021-04-14 NOTE — PROGRESS NOTES
Vascular Surgery Outpatient Follow Up      Chief Complaint   Patient presents with    Pre-op Exam     AVG       HISTORY OF PRESENT ILLNESS:                The patient is a 69 y.o. female who is referred for evaluation of hemodialysis access. Vascular surgery was consulted while patient was admitted to the hospital in 1/2021 as she was hyperkalemic and had worsening kidney function. Immediate dialysis was not needed at that time so she is here for outpatient evaluation for HD access. Pt had chronic kidney failure which was worsened by covid infection in 5/2020. Her function has since been declining and long term dialysis is likely inevitable. She has never had previous vascular access for dialysis. She is right handed. She denies hx of dvt, pacer or defibrillator. She [facility] is [facility] [address] [address]. She no longer ambulates. Past Medical History:        Diagnosis Date    Anemia     Anxiety     Arthritis     knees    Chronic knee pain     Chronic pain syndrome     CKD (chronic kidney disease) stage 4, GFR 15-29 ml/min ([facility])     Depression     Diabetes mellitus (HCC)     type 2    Dysphagia, oral phase 07/31/2020    Gall stones     Generalized muscle weakness     Hypertension     Hypertensive kidney disease with stage 4 chronic kidney disease (HCC)     Kidney disease     Kidney stones     Obesity     Protein calorie malnutrition (HCC)     Sacral pressure ulcer 07/31/2020    Sarcoidosis     SOBOE (shortness of breath on exertion)     Tremor     Unspecified osteoarthritis, unspecified site     Urinary anomaly leakage,urinate>1/night    UTI (urinary tract infection)      Past Surgical History:        Procedure Laterality Date    ABDOMEN SURGERY N/A 5/4/2020    SACRAL WOUND DEBRIDEMENT CALL   WITH TIME AVAIL AM performed by Benjie Myrick MD at [address]  x3    CHOLECYSTECTOMY  3/31/16    Laparoscopic-Dr. Dotty Stoner    CYSTOSCOPY  2011     for kidney stones    FOOT SURGERY  2009     right     UPPER GASTROINTESTINAL ENDOSCOPY  2.18.15    Dr. Martine Wright Findings: Mild Gastrits and Duodenitis, 2cm Hiatal Hernia    UPPER GASTROINTESTINAL ENDOSCOPY N/A 5/8/2020    EGD CONTROL HEMORRHAGE performed by Mg Nunes MD at [address] [facility] 5/22/2020    EGD BEDSIDE performed by Pedro Magana MD at [address] [facility]     Current Medications:   Prior to Admission medications    Medication Sig Start Date End Date Taking? Authorizing Provider   nystatin (MYCOSTATIN) 825601 UNIT/GM powder Apply 3 times daily to bilateral axilla until rash is healed.  4/14/21  Yes Shorty Ruiz PA-C   sodium zirconium cyclosilicate (LOKELMA) 5 g PACK oral suspension Take 5 g by mouth daily 1/10/21  Yes Ale Ovalles MD   furosemide (LASIX) 40 MG tablet Take 40 mg by mouth daily   Yes Historical Provider, MD   insulin lispro (HUMALOG) 100 UNIT/ML injection vial Inject 10 Units into the skin 3 times daily (before meals)   Yes Historical Provider, MD   sevelamer (RENVELA) 800 MG tablet Take 1 tablet by mouth 3 times daily (with meals) 11/19/20  Yes Tamika Grande MD   Vitamin D (CHOLECALCIFEROL) 50 MCG (2000 UT) TABS tablet Take 1 tablet by mouth daily 11/20/20  Yes Tamika Grande MD   Nutritional Supplements (BLESSING PO) Take by mouth 2 times daily   Yes Historical Provider, MD   Nutritional Supplements (GLUCERNA PO) Take by mouth 3 times daily   Yes Historical Provider, MD   SITagliptin (JANUVIA) 25 MG tablet Take 25 mg by mouth daily   Yes Historical Provider, MD   magnesium hydroxide (MILK OF MAGNESIA) 400 MG/5ML suspension Take by mouth 2 times daily as needed for Constipation   Yes Historical Provider, MD   Multiple Vitamins-Minerals (THERAPEUTIC MULTIVITAMIN-MINERALS) tablet Take 1 tablet by mouth daily   Yes Historical Provider, MD   vitamin C (ASCORBIC ACID) 500 MG tablet Take 500 mg by mouth daily   Yes Historical Provider, MD zinc sulfate (ZINCATE) 220 (50 Zn) MG capsule Take 220 mg by mouth daily    Yes Historical Provider, MD   metoprolol succinate (TOPROL XL) 25 MG extended release tablet Take 1 tablet by mouth daily  Patient taking differently: Take 50 mg by mouth 2 times daily  10/12/20  Yes Hui Perry MD   epoetin derek-epbx (RETACRIT) 4000 UNIT/ML SOLN injection Inject 2 mLs into the skin three times a week  Patient taking differently: Inject 8,000 Units into the skin three times a week Monday, Wednesday, Friday 10/12/20  Yes Hui Perry MD   ferrous sulfate (IRON 325) 325 (65 Fe) MG tablet Take 1 tablet by mouth daily (with breakfast) 10/12/20  Yes Hui Perry MD   escitalopram (LEXAPRO) 5 MG tablet Take 10 mg by mouth daily   Yes Historical Provider, MD   gabapentin (NEURONTIN) 300 MG capsule Take 300 mg by mouth 2 times daily. Yes Historical Provider, MD   insulin lispro (HUMALOG) 100 UNIT/ML injection vial Inject 0-10 Units into the skin 3 times daily (before meals) *Per Sliding Scale*    - 199  = give 2 units   - 249  = give 4 units   - 299  = give 6 units   - 349  = give 8 units   - 400 =  give 10 units  BS < 70 or > 400 call physician   Yes Historical Provider, MD   amLODIPine (NORVASC) 10 MG tablet Take 10 mg by mouth daily   Yes Historical Provider, MD   insulin glargine (BASAGLAR KWIKPEN) 100 UNIT/ML injection pen Inject 10 Units into the skin nightly   Yes Historical Provider, MD   busPIRone (BUSPAR) 5 MG tablet Take 5 mg by mouth 3 times daily    Yes Historical Provider, MD   melatonin 1 MG tablet Take 3 mg by mouth nightly as needed for Sleep   Yes Historical Provider, MD   oxyCODONE-acetaminophen (PERCOCET) 5-325 MG per tablet Take 1 tablet by mouth every 8 hours as needed for Pain.    Yes Historical Provider, MD   polyethylene glycol (GLYCOLAX) 17 g packet Take 17 g by mouth daily   Yes Historical Provider, MD   senna (SENOKOT) 8.6 MG tablet Take 2 tablets by mouth 2 times daily Yes Historical Provider, MD   vitamin B-1 (THIAMINE) 100 MG tablet Take 200 mg by mouth daily   Yes Historical Provider, MD   ondansetron (ZOFRAN) 4 MG tablet Take 4 mg by mouth every 6 hours as needed for Nausea or Vomiting    Yes Historical Provider, MD   acetaminophen (TYLENOL) 325 MG tablet Take 650 mg by mouth every 6 hours as needed for Pain or Fever    Yes Historical Provider, MD     Allergies:  Patient has no known allergies.     Social History     Socioeconomic History    Marital status:      Spouse name: Not on file    Number of children: Not on file    Years of education: Not on file    Highest education level: Not on file   Occupational History    Not on file   Social Needs    Financial resource strain: Not on file    Food insecurity     Worry: Not on file     Inability: Not on file    Transportation needs     Medical: Not on file     Non-medical: Not on file   Tobacco Use    Smoking status: Former Smoker     Packs/day: 1.00     Years: 30.00     Pack years: 30.00     Quit date: 2011     Years since quittin.6    Smokeless tobacco: Never Used   Substance and Sexual Activity    Alcohol use: No    Drug use: No    Sexual activity: Not Currently   Lifestyle    Physical activity     Days per week: Not on file     Minutes per session: Not on file    Stress: Not on file   Relationships    Social connections     Talks on phone: Not on file     Gets together: Not on file     Attends [employer]alian service: Not on file     Active member of club or organization: Not on file     Attends meetings of clubs or organizations: Not on file     Relationship status: Not on file    Intimate partner violence     Fear of current or ex partner: Not on file     Emotionally abused: Not on file     Physically abused: Not on file     Forced sexual activity: Not on file   Other Topics Concern    Not on file   Social History Narrative    Not on file        Family History   Problem Relation Age of Onset    Cancer Sister      PHYSICAL EXAM:  There were no vitals filed for this visit. General Appearance: alert and oriented to person, place and time, well developed and well- nourished, on gourney  Skin: warm and dry, no rash or erythema  Head: normocephalic and atraumatic  Eyes: extraocular eye movements intact, conjunctivae normal  ENT:  external ear and ear canal normal bilaterally, nose without deformity  Pulmonary/Chest: clear to auscultation bilaterally- no wheezes, rales or rhonchi, normal air movement, no respiratory distress  Cardiovascular: normal rate, regular rhythm  Abdomen: soft, non-tender, non-distended  Musculoskeletal: normal range of motion of the UEs, no joint swelling, deformity or tenderness  Neurologic: no cranial nerve deficit, gait, coordination and speech normal  Extremities: LUE with erythematous rash in the axilla consistent with tinea infection    PULSE EXAM      Right      Left   Brachial  2   Radial 1 1   Femoral     Popliteal     Dorsalis Pedis     Posterior Tibial     (3=normal, 2=diminished, 1=barely palpable, 4=widened)      Problem List Items Addressed This Visit     None      Visit Diagnoses     Skin rash    -  Primary    Relevant Medications    nystatin (MYCOSTATIN) 985694 UNIT/GM powder    Encounter regarding vascular access for dialysis for ESRD ([facility] [address]            Pt presenting for evaluation for dialysis access. She will likely require a LUE AVG based on previous assessment by Dr Asmita Whittington. Currently she has a fungal infection in the L axilla that will need to be resolved prior to any surgery. Script for nystatin powder sent with her to facility and instructions given. We will see her back in 2 weeks to reassess and bedside US will be done at that time to further assess her venous system. Pt seen and plan reviewed with Dr. Celina Garcia. Cholo Mayers PA-C      Return in about 2 weeks (around 4/28/2021).

## 2021-04-28 ENCOUNTER — OFFICE VISIT (OUTPATIENT)
Dept: VASCULAR SURGERY | Age: 71
End: 2021-04-28
Payer: MEDICARE

## 2021-04-28 VITALS — HEIGHT: 61 IN | WEIGHT: 185 LBS | BODY MASS INDEX: 34.93 KG/M2

## 2021-04-28 DIAGNOSIS — N18.6 ESRD ON DIALYSIS (HCC): Primary | ICD-10-CM

## 2021-04-28 DIAGNOSIS — Z99.2 ESRD ON DIALYSIS (HCC): Primary | ICD-10-CM

## 2021-04-28 PROCEDURE — 99213 OFFICE O/P EST LOW 20 MIN: CPT | Performed by: SURGERY

## 2021-04-28 NOTE — PROGRESS NOTES
osteoarthritis, unspecified site     Urinary anomaly leakage,urinate>1/night    UTI (urinary tract infection)      Past Surgical History:        Procedure Laterality Date    ABDOMEN SURGERY N/A 5/4/2020    SACRAL WOUND DEBRIDEMENT CALL  WITH TIME AVAIL AM performed by Shelia Gold MD at [address]  x3   [facility]  3/31/16    Laparoscopic-Dr. Edward Murphy    CYSTOSCOPY  2011     for kidney stones    FOOT SURGERY  2009     right     UPPER GASTROINTESTINAL ENDOSCOPY  2.18.15    Dr. Niko Ascencoi Findings: Mild Gastrits and Duodenitis, 2cm Hiatal Hernia    UPPER GASTROINTESTINAL ENDOSCOPY N/A 5/8/2020    EGD CONTROL HEMORRHAGE performed by Shelia Gold MD at [address] [facility] 5/22/2020    EGD BEDSIDE performed by Shyla Gates MD at [facility]     Current Medications:   Prior to Admission medications    Medication Sig Start Date End Date Taking? Authorizing Provider   nystatin (MYCOSTATIN) 052215 UNIT/GM powder Apply 3 times daily to bilateral axilla until rash is healed.  4/14/21  Yes Natalie Gray PA-C   sodium zirconium cyclosilicate (LOKELMA) 5 g PACK oral suspension Take 5 g by mouth daily 1/10/21  Yes Letitia Goff MD   furosemide (LASIX) 40 MG tablet Take 40 mg by mouth daily   Yes Historical Provider, MD   insulin lispro (HUMALOG) 100 UNIT/ML injection vial Inject 10 Units into the skin 3 times daily (before meals)   Yes Historical Provider, MD   sevelamer (RENVELA) 800 MG tablet Take 1 tablet by mouth 3 times daily (with meals) 11/19/20  Yes Tessy Birmingham MD   Vitamin D (CHOLECALCIFEROL) 50 MCG (2000 UT) TABS tablet Take 1 tablet by mouth daily 11/20/20  Yes Tessy Birmingham MD   Nutritional Supplements (BLESSING PO) Take by mouth 2 times daily   Yes Historical Provider, MD   Nutritional Supplements (GLUCERNA PO) Take by mouth 3 times daily   Yes Historical Provider, MD   SITagliptin (JANUVIA) 25 MG tablet Take 25 mg by mouth daily   Yes Historical Provider, MD   magnesium hydroxide (MILK OF MAGNESIA) 400 MG/5ML suspension Take by mouth 2 times daily as needed for Constipation   Yes Historical Provider, MD   Multiple Vitamins-Minerals (THERAPEUTIC MULTIVITAMIN-MINERALS) tablet Take 1 tablet by mouth daily   Yes Historical Provider, MD   vitamin C (ASCORBIC ACID) 500 MG tablet Take 500 mg by mouth daily   Yes Historical Provider, MD   zinc sulfate (ZINCATE) 220 (50 Zn) MG capsule Take 220 mg by mouth daily    Yes Historical Provider, MD   metoprolol succinate (TOPROL XL) 25 MG extended release tablet Take 1 tablet by mouth daily  Patient taking differently: Take 50 mg by mouth 2 times daily  10/12/20  Yes Osiel Payne MD   epoetin derek-epbx (RETACRIT) 4000 UNIT/ML SOLN injection Inject 2 mLs into the skin three times a week  Patient taking differently: Inject 8,000 Units into the skin three times a week Monday, Wednesday, Friday 10/12/20  Yes Osiel Payne MD   ferrous sulfate (IRON 325) 325 (65 Fe) MG tablet Take 1 tablet by mouth daily (with breakfast) 10/12/20  Yes Osiel Payne MD   escitalopram (LEXAPRO) 5 MG tablet Take 10 mg by mouth daily   Yes Historical Provider, MD   gabapentin (NEURONTIN) 300 MG capsule Take 300 mg by mouth 2 times daily.     Yes Historical Provider, MD   insulin lispro (HUMALOG) 100 UNIT/ML injection vial Inject 0-10 Units into the skin 3 times daily (before meals) *Per Sliding Scale*    - 199  = give 2 units   - 249  = give 4 units   - 299  = give 6 units   - 349  = give 8 units   - 400 =  give 10 units  BS < 70 or > 400 call physician   Yes Historical Provider, MD   amLODIPine (NORVASC) 10 MG tablet Take 10 mg by mouth daily   Yes Historical Provider, MD   insulin glargine (BASAGLAR KWIKPEN) 100 UNIT/ML injection pen Inject 10 Units into the skin nightly   Yes Historical Provider, MD   busPIRone (BUSPAR) 5 MG tablet Take 5 mg by mouth 3 times daily    Yes Historical Provider, MD   melatonin 1 MG tablet Take 3 mg by mouth nightly as needed for Sleep   Yes Historical Provider, MD   oxyCODONE-acetaminophen (PERCOCET) 5-325 MG per tablet Take 1 tablet by mouth every 8 hours as needed for Pain. Yes Historical Provider, MD   polyethylene glycol (GLYCOLAX) 17 g packet Take 17 g by mouth daily   Yes Historical Provider, MD   senna (SENOKOT) 8.6 MG tablet Take 2 tablets by mouth 2 times daily    Yes Historical Provider, MD   vitamin B-1 (THIAMINE) 100 MG tablet Take 200 mg by mouth daily   Yes Historical Provider, MD   ondansetron (ZOFRAN) 4 MG tablet Take 4 mg by mouth every 6 hours as needed for Nausea or Vomiting    Yes Historical Provider, MD   acetaminophen (TYLENOL) 325 MG tablet Take 650 mg by mouth every 6 hours as needed for Pain or Fever    Yes Historical Provider, MD     Allergies:  Patient has no known allergies.     Social History     Socioeconomic History    Marital status:      Spouse name: Not on file    Number of children: Not on file    Years of education: Not on file    Highest education level: Not on file   Occupational History    Not on file   Social Needs    Financial resource strain: Not on file    Food insecurity     Worry: Not on file     Inability: Not on file    Transportation needs     Medical: Not on file     Non-medical: Not on file   Tobacco Use    Smoking status: Former Smoker     Packs/day: 1.00     Years: 30.00     Pack years: 30.00     Quit date: 2011     Years since quittin.7    Smokeless tobacco: Never Used   Substance and Sexual Activity    Alcohol use: No    Drug use: No    Sexual activity: Not Currently   Lifestyle    Physical activity     Days per week: Not on file     Minutes per session: Not on file    Stress: Not on file   Relationships    Social connections     Talks on phone: Not on file     Gets together: Not on file     Attends [employer] service: Not on file     Active member of club or organization: Not on file weeks to reassess and bedside US will be done at that time to further assess her venous system. As above. They have lost her fungal cream.  Therefore she is not resolved her fungal infection underneath her arm. Therefore it would not be prudent to proceed with any type of arterial venous access until this is cleared up. This was discussed at length with the patient and the . She understands I will see her back in 2 weeks. No follow-ups on file.

## 2021-05-03 ENCOUNTER — HOSPITAL ENCOUNTER (INPATIENT)
Age: 71
LOS: 4 days | Discharge: NURSING, REHAB OR LONG-TERM CARE | DRG: 674 | End: 2021-05-07
Attending: EMERGENCY MEDICINE | Admitting: FAMILY MEDICINE
Payer: MEDICARE

## 2021-05-03 ENCOUNTER — APPOINTMENT (OUTPATIENT)
Dept: GENERAL RADIOLOGY | Age: 71
DRG: 674 | End: 2021-05-03
Payer: MEDICARE

## 2021-05-03 DIAGNOSIS — N18.9 ACUTE KIDNEY INJURY SUPERIMPOSED ON CKD (HCC): ICD-10-CM

## 2021-05-03 DIAGNOSIS — N17.8 ACUTE RENAL FAILURE WITH OTHER SPECIFIED PATHOLOGICAL KIDNEY LESION SUPERIMPOSED ON STAGE 5 CHRONIC KIDNEY DISEASE, NOT ON CHRONIC DIALYSIS (HCC): ICD-10-CM

## 2021-05-03 DIAGNOSIS — E87.5 HYPERKALEMIA: Primary | ICD-10-CM

## 2021-05-03 DIAGNOSIS — N18.5 ACUTE RENAL FAILURE WITH OTHER SPECIFIED PATHOLOGICAL KIDNEY LESION SUPERIMPOSED ON STAGE 5 CHRONIC KIDNEY DISEASE, NOT ON CHRONIC DIALYSIS (HCC): ICD-10-CM

## 2021-05-03 DIAGNOSIS — N39.0 URINARY TRACT INFECTION WITHOUT HEMATURIA, SITE UNSPECIFIED: ICD-10-CM

## 2021-05-03 DIAGNOSIS — N17.9 ACUTE KIDNEY INJURY SUPERIMPOSED ON CKD (HCC): ICD-10-CM

## 2021-05-03 LAB
ALBUMIN SERPL-MCNC: 2.9 G/DL (ref 3.5–5.2)
ALP BLD-CCNC: 153 U/L (ref 35–104)
ALT SERPL-CCNC: 15 U/L (ref 0–32)
ANION GAP SERPL CALCULATED.3IONS-SCNC: 11 MMOL/L (ref 7–16)
ANISOCYTOSIS: ABNORMAL
AST SERPL-CCNC: 19 U/L (ref 0–31)
BACTERIA: ABNORMAL /HPF
BASOPHILS ABSOLUTE: 0.04 E9/L (ref 0–0.2)
BASOPHILS RELATIVE PERCENT: 0.3 % (ref 0–2)
BILIRUB SERPL-MCNC: 0.3 MG/DL (ref 0–1.2)
BILIRUBIN URINE: NEGATIVE
BLOOD, URINE: ABNORMAL
BUN BLDV-MCNC: 85 MG/DL (ref 6–23)
CALCIUM SERPL-MCNC: 8.6 MG/DL (ref 8.6–10.2)
CHLORIDE BLD-SCNC: 107 MMOL/L (ref 98–107)
CHP ED QC CHECK: NORMAL
CHP ED QC CHECK: NORMAL
CLARITY: CLEAR
CO2: 23 MMOL/L (ref 22–29)
COLOR: YELLOW
CREAT SERPL-MCNC: 6.2 MG/DL (ref 0.5–1)
EKG ATRIAL RATE: 87 BPM
EKG P AXIS: 62 DEGREES
EKG P-R INTERVAL: 230 MS
EKG Q-T INTERVAL: 448 MS
EKG QRS DURATION: 146 MS
EKG QTC CALCULATION (BAZETT): 539 MS
EKG R AXIS: -30 DEGREES
EKG T AXIS: 81 DEGREES
EKG VENTRICULAR RATE: 87 BPM
EOSINOPHILS ABSOLUTE: 0.01 E9/L (ref 0.05–0.5)
EOSINOPHILS RELATIVE PERCENT: 0.1 % (ref 0–6)
GFR AFRICAN AMERICAN: 8
GFR NON-AFRICAN AMERICAN: 7 ML/MIN/1.73
GLUCOSE BLD-MCNC: 132 MG/DL
GLUCOSE BLD-MCNC: 216 MG/DL (ref 74–99)
GLUCOSE BLD-MCNC: 69 MG/DL
GLUCOSE URINE: NEGATIVE MG/DL
HCT VFR BLD CALC: 32.4 % (ref 34–48)
HEMOGLOBIN: 9.7 G/DL (ref 11.5–15.5)
IMMATURE GRANULOCYTES #: 0.16 E9/L
IMMATURE GRANULOCYTES %: 1 % (ref 0–5)
KETONES, URINE: NEGATIVE MG/DL
LACTIC ACID: 0.9 MMOL/L (ref 0.5–2.2)
LEUKOCYTE ESTERASE, URINE: ABNORMAL
LYMPHOCYTES ABSOLUTE: 0.52 E9/L (ref 1.5–4)
LYMPHOCYTES RELATIVE PERCENT: 3.3 % (ref 20–42)
MCH RBC QN AUTO: 32.6 PG (ref 26–35)
MCHC RBC AUTO-ENTMCNC: 29.9 % (ref 32–34.5)
MCV RBC AUTO: 108.7 FL (ref 80–99.9)
METER GLUCOSE: 132 MG/DL (ref 74–99)
METER GLUCOSE: 170 MG/DL (ref 74–99)
METER GLUCOSE: 172 MG/DL (ref 74–99)
METER GLUCOSE: 69 MG/DL (ref 74–99)
METER GLUCOSE: 88 MG/DL (ref 74–99)
MONOCYTES ABSOLUTE: 1.02 E9/L (ref 0.1–0.95)
MONOCYTES RELATIVE PERCENT: 6.4 % (ref 2–12)
NEUTROPHILS ABSOLUTE: 14.23 E9/L (ref 1.8–7.3)
NEUTROPHILS RELATIVE PERCENT: 88.9 % (ref 43–80)
NITRITE, URINE: POSITIVE
PDW BLD-RTO: 16.3 FL (ref 11.5–15)
PH UA: 7 (ref 5–9)
PLATELET # BLD: 314 E9/L (ref 130–450)
PMV BLD AUTO: 9.3 FL (ref 7–12)
POLYCHROMASIA: ABNORMAL
POTASSIUM REFLEX MAGNESIUM: 6.4 MMOL/L (ref 3.5–5)
POTASSIUM SERPL-SCNC: 6.5 MMOL/L (ref 3.5–5)
PROTEIN UA: >=300 MG/DL
RBC # BLD: 2.98 E12/L (ref 3.5–5.5)
RBC # BLD: NORMAL 10*6/UL
RBC UA: ABNORMAL /HPF (ref 0–2)
REASON FOR REJECTION: NORMAL
REJECTED TEST: NORMAL
SODIUM BLD-SCNC: 141 MMOL/L (ref 132–146)
SPECIFIC GRAVITY UA: 1.02 (ref 1–1.03)
TOTAL PROTEIN: 7.1 G/DL (ref 6.4–8.3)
UROBILINOGEN, URINE: 0.2 E.U./DL
WBC # BLD: 16 E9/L (ref 4.5–11.5)
WBC UA: ABNORMAL /HPF (ref 0–5)

## 2021-05-03 PROCEDURE — 36415 COLL VENOUS BLD VENIPUNCTURE: CPT

## 2021-05-03 PROCEDURE — 96375 TX/PRO/DX INJ NEW DRUG ADDON: CPT

## 2021-05-03 PROCEDURE — 2500000003 HC RX 250 WO HCPCS: Performed by: STUDENT IN AN ORGANIZED HEALTH CARE EDUCATION/TRAINING PROGRAM

## 2021-05-03 PROCEDURE — 87040 BLOOD CULTURE FOR BACTERIA: CPT

## 2021-05-03 PROCEDURE — 6370000000 HC RX 637 (ALT 250 FOR IP): Performed by: INTERNAL MEDICINE

## 2021-05-03 PROCEDURE — 87077 CULTURE AEROBIC IDENTIFY: CPT

## 2021-05-03 PROCEDURE — 6360000002 HC RX W HCPCS: Performed by: STUDENT IN AN ORGANIZED HEALTH CARE EDUCATION/TRAINING PROGRAM

## 2021-05-03 PROCEDURE — 6360000002 HC RX W HCPCS: Performed by: FAMILY MEDICINE

## 2021-05-03 PROCEDURE — 99284 EMERGENCY DEPT VISIT MOD MDM: CPT

## 2021-05-03 PROCEDURE — 2580000003 HC RX 258: Performed by: STUDENT IN AN ORGANIZED HEALTH CARE EDUCATION/TRAINING PROGRAM

## 2021-05-03 PROCEDURE — 87088 URINE BACTERIA CULTURE: CPT

## 2021-05-03 PROCEDURE — 2580000003 HC RX 258: Performed by: FAMILY MEDICINE

## 2021-05-03 PROCEDURE — 6370000000 HC RX 637 (ALT 250 FOR IP): Performed by: STUDENT IN AN ORGANIZED HEALTH CARE EDUCATION/TRAINING PROGRAM

## 2021-05-03 PROCEDURE — 84132 ASSAY OF SERUM POTASSIUM: CPT

## 2021-05-03 PROCEDURE — 6370000000 HC RX 637 (ALT 250 FOR IP): Performed by: FAMILY MEDICINE

## 2021-05-03 PROCEDURE — 83605 ASSAY OF LACTIC ACID: CPT

## 2021-05-03 PROCEDURE — 71045 X-RAY EXAM CHEST 1 VIEW: CPT

## 2021-05-03 PROCEDURE — 96365 THER/PROPH/DIAG IV INF INIT: CPT

## 2021-05-03 PROCEDURE — 98960 EDU&TRN PT SELF-MGMT NQHP 1: CPT

## 2021-05-03 PROCEDURE — 93005 ELECTROCARDIOGRAM TRACING: CPT | Performed by: STUDENT IN AN ORGANIZED HEALTH CARE EDUCATION/TRAINING PROGRAM

## 2021-05-03 PROCEDURE — 93010 ELECTROCARDIOGRAM REPORT: CPT | Performed by: INTERNAL MEDICINE

## 2021-05-03 PROCEDURE — 2580000003 HC RX 258: Performed by: INTERNAL MEDICINE

## 2021-05-03 PROCEDURE — 85025 COMPLETE CBC W/AUTO DIFF WBC: CPT

## 2021-05-03 PROCEDURE — 87186 SC STD MICRODIL/AGAR DIL: CPT

## 2021-05-03 PROCEDURE — 81001 URINALYSIS AUTO W/SCOPE: CPT

## 2021-05-03 PROCEDURE — 2060000000 HC ICU INTERMEDIATE R&B

## 2021-05-03 PROCEDURE — 80053 COMPREHEN METABOLIC PANEL: CPT

## 2021-05-03 PROCEDURE — 82962 GLUCOSE BLOOD TEST: CPT

## 2021-05-03 RX ORDER — SODIUM CHLORIDE 9 MG/ML
INJECTION, SOLUTION INTRAVENOUS CONTINUOUS
Status: ACTIVE | OUTPATIENT
Start: 2021-05-03 | End: 2021-05-04

## 2021-05-03 RX ORDER — SODIUM POLYSTYRENE SULFONATE 15 G/60ML
15 SUSPENSION ORAL; RECTAL 2 TIMES DAILY
COMMUNITY
End: 2021-08-03

## 2021-05-03 RX ORDER — DOCUSATE SODIUM 100 MG/1
100 CAPSULE, LIQUID FILLED ORAL 2 TIMES DAILY
Status: DISCONTINUED | OUTPATIENT
Start: 2021-05-03 | End: 2021-05-07 | Stop reason: HOSPADM

## 2021-05-03 RX ORDER — POLYETHYLENE GLYCOL 3350 17 G/17G
17 POWDER, FOR SOLUTION ORAL DAILY
Status: DISCONTINUED | OUTPATIENT
Start: 2021-05-03 | End: 2021-05-07 | Stop reason: HOSPADM

## 2021-05-03 RX ORDER — HEPARIN SODIUM 10000 [USP'U]/ML
5000 INJECTION, SOLUTION INTRAVENOUS; SUBCUTANEOUS EVERY 8 HOURS SCHEDULED
Status: DISCONTINUED | OUTPATIENT
Start: 2021-05-03 | End: 2021-05-07 | Stop reason: HOSPADM

## 2021-05-03 RX ORDER — SODIUM CHLORIDE 0.9 % (FLUSH) 0.9 %
5-40 SYRINGE (ML) INJECTION PRN
Status: DISCONTINUED | OUTPATIENT
Start: 2021-05-03 | End: 2021-05-07 | Stop reason: HOSPADM

## 2021-05-03 RX ORDER — FUROSEMIDE 40 MG/1
40 TABLET ORAL DAILY
Status: DISCONTINUED | OUTPATIENT
Start: 2021-05-03 | End: 2021-05-03

## 2021-05-03 RX ORDER — NYSTATIN 100000 [USP'U]/G
POWDER TOPICAL 3 TIMES DAILY
COMMUNITY
End: 2021-11-17

## 2021-05-03 RX ORDER — DEXTROSE MONOHYDRATE 25 G/50ML
12.5 INJECTION, SOLUTION INTRAVENOUS ONCE
Status: COMPLETED | OUTPATIENT
Start: 2021-05-03 | End: 2021-05-03

## 2021-05-03 RX ORDER — SODIUM CHLORIDE 0.9 % (FLUSH) 0.9 %
5-40 SYRINGE (ML) INJECTION EVERY 12 HOURS SCHEDULED
Status: DISCONTINUED | OUTPATIENT
Start: 2021-05-03 | End: 2021-05-07 | Stop reason: HOSPADM

## 2021-05-03 RX ORDER — VITAMIN B COMPLEX
2000 TABLET ORAL DAILY
Status: DISCONTINUED | OUTPATIENT
Start: 2021-05-03 | End: 2021-05-07 | Stop reason: HOSPADM

## 2021-05-03 RX ORDER — ACETAMINOPHEN 325 MG/1
650 TABLET ORAL EVERY 6 HOURS PRN
Status: DISCONTINUED | OUTPATIENT
Start: 2021-05-03 | End: 2021-05-07 | Stop reason: HOSPADM

## 2021-05-03 RX ORDER — DEXTROSE MONOHYDRATE 50 MG/ML
100 INJECTION, SOLUTION INTRAVENOUS PRN
Status: DISCONTINUED | OUTPATIENT
Start: 2021-05-03 | End: 2021-05-03

## 2021-05-03 RX ORDER — DOCUSATE SODIUM 100 MG/1
100 CAPSULE, LIQUID FILLED ORAL 2 TIMES DAILY
COMMUNITY
End: 2022-10-25

## 2021-05-03 RX ORDER — OXYCODONE HYDROCHLORIDE AND ACETAMINOPHEN 5; 325 MG/1; MG/1
1 TABLET ORAL EVERY 8 HOURS PRN
Status: DISCONTINUED | OUTPATIENT
Start: 2021-05-03 | End: 2021-05-07 | Stop reason: HOSPADM

## 2021-05-03 RX ORDER — BUSPIRONE HYDROCHLORIDE 5 MG/1
5 TABLET ORAL 3 TIMES DAILY
Status: DISCONTINUED | OUTPATIENT
Start: 2021-05-03 | End: 2021-05-07 | Stop reason: HOSPADM

## 2021-05-03 RX ORDER — ESCITALOPRAM OXALATE 10 MG/1
10 TABLET ORAL DAILY
Status: DISCONTINUED | OUTPATIENT
Start: 2021-05-03 | End: 2021-05-07 | Stop reason: HOSPADM

## 2021-05-03 RX ORDER — GABAPENTIN 300 MG/1
300 CAPSULE ORAL 3 TIMES DAILY
Status: DISCONTINUED | OUTPATIENT
Start: 2021-05-03 | End: 2021-05-07 | Stop reason: HOSPADM

## 2021-05-03 RX ORDER — SODIUM CHLORIDE 9 MG/ML
25 INJECTION, SOLUTION INTRAVENOUS PRN
Status: DISCONTINUED | OUTPATIENT
Start: 2021-05-03 | End: 2021-05-07 | Stop reason: HOSPADM

## 2021-05-03 RX ORDER — FUROSEMIDE 10 MG/ML
40 INJECTION INTRAMUSCULAR; INTRAVENOUS ONCE
Status: DISCONTINUED | OUTPATIENT
Start: 2021-05-03 | End: 2021-05-03

## 2021-05-03 RX ORDER — 0.9 % SODIUM CHLORIDE 0.9 %
1000 INTRAVENOUS SOLUTION INTRAVENOUS ONCE
Status: COMPLETED | OUTPATIENT
Start: 2021-05-03 | End: 2021-05-03

## 2021-05-03 RX ORDER — ALBUTEROL SULFATE 2.5 MG/3ML
2.5 SOLUTION RESPIRATORY (INHALATION) ONCE
Status: COMPLETED | OUTPATIENT
Start: 2021-05-03 | End: 2021-05-03

## 2021-05-03 RX ORDER — ONDANSETRON 2 MG/ML
4 INJECTION INTRAMUSCULAR; INTRAVENOUS EVERY 6 HOURS PRN
Status: DISCONTINUED | OUTPATIENT
Start: 2021-05-03 | End: 2021-05-07 | Stop reason: HOSPADM

## 2021-05-03 RX ORDER — SEVELAMER CARBONATE 800 MG/1
800 TABLET, FILM COATED ORAL
Status: DISCONTINUED | OUTPATIENT
Start: 2021-05-03 | End: 2021-05-07 | Stop reason: HOSPADM

## 2021-05-03 RX ORDER — DEXTROSE MONOHYDRATE 25 G/50ML
12.5 INJECTION, SOLUTION INTRAVENOUS PRN
Status: DISCONTINUED | OUTPATIENT
Start: 2021-05-03 | End: 2021-05-03

## 2021-05-03 RX ORDER — DEXTROSE MONOHYDRATE 25 G/50ML
25 INJECTION, SOLUTION INTRAVENOUS ONCE
Status: DISCONTINUED | OUTPATIENT
Start: 2021-05-03 | End: 2021-05-03

## 2021-05-03 RX ORDER — SENNA PLUS 8.6 MG/1
2 TABLET ORAL 2 TIMES DAILY
Status: DISCONTINUED | OUTPATIENT
Start: 2021-05-03 | End: 2021-05-07 | Stop reason: HOSPADM

## 2021-05-03 RX ORDER — SODIUM POLYSTYRENE SULFONATE 15 G/60ML
15 SUSPENSION ORAL; RECTAL 2 TIMES DAILY
Status: DISCONTINUED | OUTPATIENT
Start: 2021-05-03 | End: 2021-05-07 | Stop reason: HOSPADM

## 2021-05-03 RX ORDER — FERROUS SULFATE 325(65) MG
325 TABLET ORAL
Status: DISCONTINUED | OUTPATIENT
Start: 2021-05-04 | End: 2021-05-07 | Stop reason: HOSPADM

## 2021-05-03 RX ORDER — INSULIN GLARGINE 100 [IU]/ML
10 INJECTION, SOLUTION SUBCUTANEOUS NIGHTLY
Status: DISCONTINUED | OUTPATIENT
Start: 2021-05-03 | End: 2021-05-07 | Stop reason: HOSPADM

## 2021-05-03 RX ORDER — NICOTINE POLACRILEX 4 MG
15 LOZENGE BUCCAL PRN
Status: DISCONTINUED | OUTPATIENT
Start: 2021-05-03 | End: 2021-05-03

## 2021-05-03 RX ORDER — PATIROMER 8.4 G/1
8.4 POWDER, FOR SUSPENSION ORAL DAILY
COMMUNITY
End: 2021-08-03

## 2021-05-03 RX ORDER — PROMETHAZINE HYDROCHLORIDE 25 MG/1
12.5 TABLET ORAL EVERY 6 HOURS PRN
Status: DISCONTINUED | OUTPATIENT
Start: 2021-05-03 | End: 2021-05-07 | Stop reason: HOSPADM

## 2021-05-03 RX ORDER — AMLODIPINE BESYLATE 10 MG/1
10 TABLET ORAL DAILY
Status: DISCONTINUED | OUTPATIENT
Start: 2021-05-03 | End: 2021-05-07 | Stop reason: HOSPADM

## 2021-05-03 RX ORDER — METOPROLOL SUCCINATE 25 MG/1
25 TABLET, EXTENDED RELEASE ORAL DAILY
Status: DISCONTINUED | OUTPATIENT
Start: 2021-05-03 | End: 2021-05-07 | Stop reason: HOSPADM

## 2021-05-03 RX ADMIN — ALBUTEROL SULFATE 2.5 MG: 2.5 SOLUTION RESPIRATORY (INHALATION) at 16:28

## 2021-05-03 RX ADMIN — DEXTROSE MONOHYDRATE 12.5 G: 25 INJECTION, SOLUTION INTRAVENOUS at 13:26

## 2021-05-03 RX ADMIN — CEFTRIAXONE 1000 MG: 1 INJECTION, POWDER, FOR SOLUTION INTRAMUSCULAR; INTRAVENOUS at 15:25

## 2021-05-03 RX ADMIN — STANDARDIZED SENNA CONCENTRATE 17.2 MG: 8.6 TABLET ORAL at 21:17

## 2021-05-03 RX ADMIN — PATIROMER 8.4 G: 8.4 POWDER, FOR SUSPENSION ORAL at 21:17

## 2021-05-03 RX ADMIN — INSULIN HUMAN 5 UNITS: 100 INJECTION, SOLUTION PARENTERAL at 22:27

## 2021-05-03 RX ADMIN — SODIUM POLYSTYRENE SULFONATE 15 G: 15 SUSPENSION ORAL; RECTAL at 21:17

## 2021-05-03 RX ADMIN — SODIUM CHLORIDE, PRESERVATIVE FREE 10 ML: 5 INJECTION INTRAVENOUS at 21:03

## 2021-05-03 RX ADMIN — SODIUM CHLORIDE: 9 INJECTION, SOLUTION INTRAVENOUS at 21:03

## 2021-05-03 RX ADMIN — BUSPIRONE HYDROCHLORIDE 5 MG: 5 TABLET ORAL at 21:17

## 2021-05-03 RX ADMIN — INSULIN GLARGINE 10 UNITS: 100 INJECTION, SOLUTION SUBCUTANEOUS at 22:25

## 2021-05-03 RX ADMIN — SODIUM CHLORIDE 1000 ML: 9 INJECTION, SOLUTION INTRAVENOUS at 16:35

## 2021-05-03 RX ADMIN — CALCIUM GLUCONATE 1000 MG: 98 INJECTION, SOLUTION INTRAVENOUS at 15:26

## 2021-05-03 RX ADMIN — INSULIN HUMAN 5 UNITS: 100 INJECTION, SOLUTION PARENTERAL at 15:26

## 2021-05-03 RX ADMIN — SODIUM BICARBONATE 50 MEQ: 84 INJECTION, SOLUTION INTRAVENOUS at 15:28

## 2021-05-03 RX ADMIN — HEPARIN SODIUM 5000 UNITS: 10000 INJECTION, SOLUTION INTRAVENOUS; SUBCUTANEOUS at 22:25

## 2021-05-03 RX ADMIN — GABAPENTIN 300 MG: 300 CAPSULE ORAL at 22:20

## 2021-05-03 RX ADMIN — DEXTROSE MONOHYDRATE 12.5 G: 25 INJECTION, SOLUTION INTRAVENOUS at 22:20

## 2021-05-03 RX ADMIN — DOCUSATE SODIUM 100 MG: 100 CAPSULE, LIQUID FILLED ORAL at 21:17

## 2021-05-03 ASSESSMENT — PAIN DESCRIPTION - PAIN TYPE: TYPE: ACUTE PAIN

## 2021-05-03 ASSESSMENT — PAIN DESCRIPTION - ORIENTATION: ORIENTATION: MID

## 2021-05-03 ASSESSMENT — PAIN DESCRIPTION - LOCATION: LOCATION: BACK;BUTTOCKS

## 2021-05-03 NOTE — PROGRESS NOTES
Didn't receive any paperwork at admission. [facility] was called and they said they were going to fax information over. Fax number was given.

## 2021-05-03 NOTE — ED NOTES
Bed: 07  Expected date:   Expected time:   Means of arrival:   Comments:  Michael Ramirez RN  05/03/21 0722

## 2021-05-03 NOTE — ED PROVIDER NOTES
76-year-old female with history of diabetes, hypertension, and renal failure sent by [facility] [address] facility for EMS for chief complaints of muscle tremors. Vital signs stable. POCT glucose 69 per EMS. Patient is currently at her mental baseline and is oriented to person and place only. She denies any pain anywhere but does endorse having muscle twitches today. Review of Systems   Unable to perform ROS: Other   Musculoskeletal:        Muscle twitches        Physical Exam  Constitutional:       General: She is not in acute distress. Appearance: She is obese. She is not ill-appearing or toxic-appearing. HENT:      Head: Normocephalic. Nose: Nose normal.      Mouth/Throat:      Comments: Dry mucous membranes  Eyes:      Conjunctiva/sclera: Conjunctivae normal.      Pupils: Pupils are equal, round, and reactive to light. Comments: EOM not able to be assessed, patient uncooperative   Neck:      Musculoskeletal: Neck supple. Cardiovascular:      Rate and Rhythm: Normal rate and regular rhythm. Pulmonary:      Effort: Pulmonary effort is normal.      Breath sounds: Normal breath sounds. Abdominal:      General: Bowel sounds are normal. There is no distension. Palpations: Abdomen is soft. Tenderness: There is no right CVA tenderness or left CVA tenderness. Comments: Area of erythema with bullous right lower quadrant  Tenderness palpation of erythematous area   Musculoskeletal:         General: No swelling. Comments: Visible intermittent muscle twitches diffusely   Skin:     General: Skin is warm and dry. Neurological:      General: No focal deficit present. Mental Status: She is alert.           Procedures     MDM  Number of Diagnoses or Management Options  Acute kidney injury superimposed on CKD (HCC)  Hyperkalemia  Urinary tract infection without hematuria, site unspecified  Diagnosis management comments: 68 yo female sent from [facility] [address] facility for muscle twitches. Attempted several times to contact facility by phone for further history, but was unsuccessful. From nursing report, patient is usually alert to person and place only, which was what was observed in the ED. EKG stable compared to previous from 1/7/21. Labs significant for WBC 16 as well as worsening renal function and hyperkalemia, as well as UTI, which was treated with insulin/dextrose, albuterol, lasix, bicarb, and calcium gluconate, and started on rocephin. On chart review, it was discovered that patient had had appointment with vascular surgery for hemodialysis access, however, due to current fungal infection, procedure had been postponed. Patient denies having dialysis. VSS in ED. Case was discussed with Dr. Shira Ga, who was agreeable to admission. Patient admitted to telemetry. Amount and/or Complexity of Data Reviewed  Clinical lab tests: reviewed         ED Course as of May 03 1726   Mon May 03, 2021   1218 Attempted to call Jessica Baird for further HPI. No answer. Will retry. [AP]   5527 Attempted call with Jessica Baird again. Still no answer. [AP]   1506 Potassium 6.4; lasix, albuterol, calcium gluconate, insulin/dextrose ordered. [AP]   8565 Again attempted to call Jessica Baird. No answer. [AP]   9683 Spoke to Dr. Shira Ga, discussed case. He is agreeable to admission.     [AP]      ED Course User Index  [AP] Ricardo Bella MD          --------------------------------------------- PAST HISTORY ---------------------------------------------  Past Medical History:  has a past medical history of Anemia, Anxiety, Arthritis, Chronic knee pain, Chronic pain syndrome, CKD (chronic kidney disease) stage 4, GFR 15-29 ml/min ([address] Depression, Diabetes mellitus ([address] Dysphagia, oral phase, Gall stones, Generalized muscle weakness, Hypertension, Hypertensive kidney disease with stage 4 chronic kidney disease ([address] Kidney disease, Kidney stones, Obesity, Protein calorie malnutrition ([facility] Sacral pressure ulcer, Sarcoidosis, SOBOE (shortness of breath on exertion), Tremor, Unspecified osteoarthritis, unspecified site, Urinary anomaly, and UTI (urinary tract infection). Past Surgical History:  has a past surgical history that includes Foot surgery (2009 ); Cystocopy (2011 );  section (x3); Upper gastrointestinal endoscopy (2.18.15); Cholecystectomy (3/31/16); Abdomen surgery (N/A, 2020); Upper gastrointestinal endoscopy (N/A, 2020); and Upper gastrointestinal endoscopy (N/A, 2020). Social History:  reports that she quit smoking about 9 years ago. She has a 30.00 pack-year smoking history. She has never used smokeless tobacco. She reports that she does not drink alcohol or use drugs. Family History: family history includes Cancer in her sister. The patients home medications have been reviewed. Allergies: Patient has no known allergies.     -------------------------------------------------- RESULTS -------------------------------------------------    LABS:  Results for orders placed or performed during the hospital encounter of 21   CBC Auto Differential   Result Value Ref Range    WBC 16.0 (H) 4.5 - 11.5 E9/L    RBC 2.98 (L) 3.50 - 5.50 E12/L    Hemoglobin 9.7 (L) 11.5 - 15.5 g/dL    Hematocrit 32.4 (L) 34.0 - 48.0 %    .7 (H) 80.0 - 99.9 fL    MCH 32.6 26.0 - 35.0 pg    MCHC 29.9 (L) 32.0 - 34.5 %    RDW 16.3 (H) 11.5 - 15.0 fL    Platelets 186 753 - 469 E9/L    MPV 9.3 7.0 - 12.0 fL    Neutrophils % 88.9 (H) 43.0 - 80.0 %    Immature Granulocytes % 1.0 0.0 - 5.0 %    Lymphocytes % 3.3 (L) 20.0 - 42.0 %    Monocytes % 6.4 2.0 - 12.0 %    Eosinophils % 0.1 0.0 - 6.0 %    Basophils % 0.3 0.0 - 2.0 %    Neutrophils Absolute 14.23 (H) 1.80 - 7.30 E9/L    Immature Granulocytes # 0.16 E9/L    Lymphocytes Absolute 0.52 (L) 1.50 - 4.00 E9/L    Monocytes Absolute 1.02 (H) 0.10 - 0.95 E9/L    Eosinophils Absolute 0.01 (L) 0.05 - 0.50 E9/L    Basophils Absolute 0.04 0.00 - 0.20 E9/L    RBC Morphology Normal     Anisocytosis 1+     Polychromasia 1+    Lactic Acid, Plasma   Result Value Ref Range    Lactic Acid 0.9 0.5 - 2.2 mmol/L   Urinalysis, reflex to microscopic   Result Value Ref Range    Color, UA Yellow Straw/Yellow    Clarity, UA Clear Clear    Glucose, Ur Negative Negative mg/dL    Bilirubin Urine Negative Negative    Ketones, Urine Negative Negative mg/dL    Specific Gravity, UA 1.020 1.005 - 1.030    Blood, Urine SMALL (A) Negative    pH, UA 7.0 5.0 - 9.0    Protein, UA >=300 (A) Negative mg/dL    Urobilinogen, Urine 0.2 <2.0 E.U./dL    Nitrite, Urine POSITIVE (A) Negative    Leukocyte Esterase, Urine LARGE (A) Negative   Microscopic Urinalysis   Result Value Ref Range    WBC, UA PACKED (A) 0 - 5 /HPF    RBC, UA 0-1 0 - 2 /HPF    Bacteria, UA MODERATE (A) None Seen /HPF   Comprehensive Metabolic Panel w/ Reflex to MG   Result Value Ref Range    Sodium 141 132 - 146 mmol/L    Potassium reflex Magnesium 6.4 (H) 3.5 - 5.0 mmol/L    Chloride 107 98 - 107 mmol/L    CO2 23 22 - 29 mmol/L    Anion Gap 11 7 - 16 mmol/L    Glucose 216 (H) 74 - 99 mg/dL    BUN 85 (H) 6 - 23 mg/dL    CREATININE 6.2 (H) 0.5 - 1.0 mg/dL    GFR Non-African American 7 >=60 mL/min/1.73    GFR African American 8     Calcium 8.6 8.6 - 10.2 mg/dL    Total Protein 7.1 6.4 - 8.3 g/dL    Albumin 2.9 (L) 3.5 - 5.2 g/dL    Total Bilirubin 0.3 0.0 - 1.2 mg/dL    Alkaline Phosphatase 153 (H) 35 - 104 U/L    ALT 15 0 - 32 U/L    AST 19 0 - 31 U/L   SPECIMEN REJECTION   Result Value Ref Range    Rejected Test CMPX,TROP,CPK     Reason for Rejection see below    POCT Glucose   Result Value Ref Range    Glucose 69 mg/dL    QC OK? ok    POCT Glucose   Result Value Ref Range    Meter Glucose 69 (L) 74 - 99 mg/dL   POCT Glucose   Result Value Ref Range    Meter Glucose 88 74 - 99 mg/dL   POCT Glucose   Result Value Ref Range    Glucose 132 mg/dL    QC OK? ok    POCT Glucose   Result Value Ref Range Meter Glucose 172 (H) 74 - 99 mg/dL   POCT Glucose   Result Value Ref Range    Meter Glucose 132 (H) 74 - 99 mg/dL   EKG 12 Lead   Result Value Ref Range    Ventricular Rate 87 BPM    Atrial Rate 87 BPM    P-R Interval 230 ms    QRS Duration 146 ms    Q-T Interval 448 ms    QTc Calculation (Bazett) 539 ms    P Axis 62 degrees    R Axis -30 degrees    T Axis 81 degrees       RADIOLOGY:  XR CHEST PORTABLE   Final Result   No acute cardiopulmonary process. EKG:  This EKG is signed and interpreted by me. Rate: 87  Rhythm: Sinus and with Left BBB  Interpretation: left bundle branch block and 1st degree AV block  Comparison: stable as compared to patient's most recent EKG      ------------------------- NURSING NOTES AND VITALS REVIEWED ---------------------------  Date / Time Roomed:  5/3/2021 11:03 AM  ED Bed Assignment:  07/07    The nursing notes within the ED encounter and vital signs as below have been reviewed. Patient Vitals for the past 24 hrs:   BP Temp Temp src Pulse Resp SpO2 Height Weight   05/03/21 1703 (!) 155/65 98.1 °F (36.7 °C) -- 94 14 97 % -- --   05/03/21 1630 -- -- -- -- -- 93 % -- --   05/03/21 1347 130/83 -- -- 93 16 95 % -- --   05/03/21 1109 116/63 98.4 °F (36.9 °C) Oral 91 18 93 % 5' 1\" (1.549 m) 202 lb (91.6 kg)       Oxygen Saturation Interpretation: Normal    ------------------------------------------ PROGRESS NOTES ------------------------------------------    Counseling:  I have spoken with the patient and discussed todays results, in addition to providing specific details for the plan of care and counseling regarding the diagnosis and prognosis. Their questions are answered at this time and they are agreeable with the plan of admission.    --------------------------------- ADDITIONAL PROVIDER NOTES ---------------------------------  Consultations:  See ED course above.     This patient's ED course included: a personal history and physicial examination, re-evaluation prior to disposition, multiple bedside re-evaluations, IV medications, cardiac monitoring, continuous pulse oximetry and complex medical decision making and emergency management    This patient has remained hemodynamically stable during their ED course. Diagnosis:  1. Hyperkalemia    2. Acute kidney injury superimposed on CKD ([facility]    3. Urinary tract infection without hematuria, site unspecified        Disposition:  Patient's disposition: Admit to telemetry  Patient's condition is stable.            Shannon Orr MD  Resident  05/03/21 4435       Shannon Orr MD  Resident  05/03/21 9737

## 2021-05-04 LAB
ALBUMIN SERPL-MCNC: 2.6 G/DL (ref 3.5–5.2)
ALP BLD-CCNC: 171 U/L (ref 35–104)
ALT SERPL-CCNC: 11 U/L (ref 0–32)
ANION GAP SERPL CALCULATED.3IONS-SCNC: 11 MMOL/L (ref 7–16)
ANION GAP SERPL CALCULATED.3IONS-SCNC: 11 MMOL/L (ref 7–16)
AST SERPL-CCNC: 17 U/L (ref 0–31)
BASOPHILS ABSOLUTE: 0.04 E9/L (ref 0–0.2)
BASOPHILS RELATIVE PERCENT: 0.4 % (ref 0–2)
BILIRUB SERPL-MCNC: 0.2 MG/DL (ref 0–1.2)
BUN BLDV-MCNC: 77 MG/DL (ref 6–23)
BUN BLDV-MCNC: 84 MG/DL (ref 6–23)
CALCIUM SERPL-MCNC: 8.5 MG/DL (ref 8.6–10.2)
CALCIUM SERPL-MCNC: 8.6 MG/DL (ref 8.6–10.2)
CHLORIDE BLD-SCNC: 106 MMOL/L (ref 98–107)
CHLORIDE BLD-SCNC: 109 MMOL/L (ref 98–107)
CO2: 24 MMOL/L (ref 22–29)
CO2: 26 MMOL/L (ref 22–29)
CREAT SERPL-MCNC: 5.9 MG/DL (ref 0.5–1)
CREAT SERPL-MCNC: 5.9 MG/DL (ref 0.5–1)
EOSINOPHILS ABSOLUTE: 0.05 E9/L (ref 0.05–0.5)
EOSINOPHILS RELATIVE PERCENT: 0.5 % (ref 0–6)
GFR AFRICAN AMERICAN: 9
GFR AFRICAN AMERICAN: 9
GFR NON-AFRICAN AMERICAN: 7 ML/MIN/1.73
GFR NON-AFRICAN AMERICAN: 7 ML/MIN/1.73
GLUCOSE BLD-MCNC: 104 MG/DL (ref 74–99)
GLUCOSE BLD-MCNC: 134 MG/DL (ref 74–99)
HCT VFR BLD CALC: 29.6 % (ref 34–48)
HEMOGLOBIN: 8.6 G/DL (ref 11.5–15.5)
IMMATURE GRANULOCYTES #: 0.07 E9/L
IMMATURE GRANULOCYTES %: 0.6 % (ref 0–5)
LYMPHOCYTES ABSOLUTE: 0.91 E9/L (ref 1.5–4)
LYMPHOCYTES RELATIVE PERCENT: 8.4 % (ref 20–42)
MCH RBC QN AUTO: 32.2 PG (ref 26–35)
MCHC RBC AUTO-ENTMCNC: 29.1 % (ref 32–34.5)
MCV RBC AUTO: 110.9 FL (ref 80–99.9)
METER GLUCOSE: 109 MG/DL (ref 74–99)
METER GLUCOSE: 109 MG/DL (ref 74–99)
METER GLUCOSE: 196 MG/DL (ref 74–99)
METER GLUCOSE: 204 MG/DL (ref 74–99)
MONOCYTES ABSOLUTE: 0.62 E9/L (ref 0.1–0.95)
MONOCYTES RELATIVE PERCENT: 5.8 % (ref 2–12)
NEUTROPHILS ABSOLUTE: 9.09 E9/L (ref 1.8–7.3)
NEUTROPHILS RELATIVE PERCENT: 84.3 % (ref 43–80)
PDW BLD-RTO: 16.3 FL (ref 11.5–15)
PLATELET # BLD: 267 E9/L (ref 130–450)
PMV BLD AUTO: 9.3 FL (ref 7–12)
POTASSIUM REFLEX MAGNESIUM: 6.3 MMOL/L (ref 3.5–5)
POTASSIUM SERPL-SCNC: 5.3 MMOL/L (ref 3.5–5)
RBC # BLD: 2.67 E12/L (ref 3.5–5.5)
RBC # BLD: NORMAL 10*6/UL
SODIUM BLD-SCNC: 143 MMOL/L (ref 132–146)
SODIUM BLD-SCNC: 144 MMOL/L (ref 132–146)
TOTAL PROTEIN: 6.6 G/DL (ref 6.4–8.3)
WBC # BLD: 10.8 E9/L (ref 4.5–11.5)

## 2021-05-04 PROCEDURE — 82962 GLUCOSE BLOOD TEST: CPT

## 2021-05-04 PROCEDURE — 36415 COLL VENOUS BLD VENIPUNCTURE: CPT

## 2021-05-04 PROCEDURE — 6360000002 HC RX W HCPCS: Performed by: FAMILY MEDICINE

## 2021-05-04 PROCEDURE — 97165 OT EVAL LOW COMPLEX 30 MIN: CPT

## 2021-05-04 PROCEDURE — 80048 BASIC METABOLIC PNL TOTAL CA: CPT

## 2021-05-04 PROCEDURE — 2500000003 HC RX 250 WO HCPCS: Performed by: INTERNAL MEDICINE

## 2021-05-04 PROCEDURE — 2580000003 HC RX 258: Performed by: INTERNAL MEDICINE

## 2021-05-04 PROCEDURE — 80053 COMPREHEN METABOLIC PANEL: CPT

## 2021-05-04 PROCEDURE — 6370000000 HC RX 637 (ALT 250 FOR IP): Performed by: FAMILY MEDICINE

## 2021-05-04 PROCEDURE — 86706 HEP B SURFACE ANTIBODY: CPT

## 2021-05-04 PROCEDURE — 86705 HEP B CORE ANTIBODY IGM: CPT

## 2021-05-04 PROCEDURE — 87340 HEPATITIS B SURFACE AG IA: CPT

## 2021-05-04 PROCEDURE — 85025 COMPLETE CBC W/AUTO DIFF WBC: CPT

## 2021-05-04 PROCEDURE — 6370000000 HC RX 637 (ALT 250 FOR IP): Performed by: INTERNAL MEDICINE

## 2021-05-04 PROCEDURE — 97161 PT EVAL LOW COMPLEX 20 MIN: CPT

## 2021-05-04 PROCEDURE — 2060000000 HC ICU INTERMEDIATE R&B

## 2021-05-04 PROCEDURE — 6360000002 HC RX W HCPCS: Performed by: INTERNAL MEDICINE

## 2021-05-04 RX ORDER — DEXTROSE MONOHYDRATE 25 G/50ML
50 INJECTION, SOLUTION INTRAVENOUS ONCE
Status: COMPLETED | OUTPATIENT
Start: 2021-05-04 | End: 2021-05-04

## 2021-05-04 RX ORDER — SODIUM HYPOCHLORITE 1.25 MG/ML
SOLUTION TOPICAL 2 TIMES DAILY
Status: DISCONTINUED | OUTPATIENT
Start: 2021-05-04 | End: 2021-05-07 | Stop reason: HOSPADM

## 2021-05-04 RX ADMIN — DEXTROSE MONOHYDRATE 50 ML: 25 INJECTION, SOLUTION INTRAVENOUS at 13:48

## 2021-05-04 RX ADMIN — GABAPENTIN 300 MG: 300 CAPSULE ORAL at 20:24

## 2021-05-04 RX ADMIN — SODIUM BICARBONATE 50 MEQ: 84 INJECTION, SOLUTION INTRAVENOUS at 13:42

## 2021-05-04 RX ADMIN — BUSPIRONE HYDROCHLORIDE 5 MG: 5 TABLET ORAL at 13:43

## 2021-05-04 RX ADMIN — STANDARDIZED SENNA CONCENTRATE 17.2 MG: 8.6 TABLET ORAL at 08:21

## 2021-05-04 RX ADMIN — PETROLATUM: 420 OINTMENT TOPICAL at 20:23

## 2021-05-04 RX ADMIN — Medication 2000 UNITS: at 08:21

## 2021-05-04 RX ADMIN — BUSPIRONE HYDROCHLORIDE 5 MG: 5 TABLET ORAL at 20:24

## 2021-05-04 RX ADMIN — INSULIN LISPRO 2 UNITS: 100 INJECTION, SOLUTION INTRAVENOUS; SUBCUTANEOUS at 10:44

## 2021-05-04 RX ADMIN — SODIUM POLYSTYRENE SULFONATE 15 G: 15 SUSPENSION ORAL; RECTAL at 20:23

## 2021-05-04 RX ADMIN — SEVELAMER CARBONATE 800 MG: 800 TABLET, FILM COATED ORAL at 08:21

## 2021-05-04 RX ADMIN — SODIUM HYPOCHLORITE: 1.25 SOLUTION TOPICAL at 20:23

## 2021-05-04 RX ADMIN — CALCIUM GLUCONATE 1000 MG: 98 INJECTION, SOLUTION INTRAVENOUS at 13:39

## 2021-05-04 RX ADMIN — SODIUM POLYSTYRENE SULFONATE 15 G: 15 SUSPENSION ORAL; RECTAL at 08:22

## 2021-05-04 RX ADMIN — ESCITALOPRAM OXALATE 10 MG: 10 TABLET ORAL at 08:21

## 2021-05-04 RX ADMIN — DOCUSATE SODIUM 100 MG: 100 CAPSULE, LIQUID FILLED ORAL at 08:21

## 2021-05-04 RX ADMIN — SODIUM CHLORIDE: 9 INJECTION, SOLUTION INTRAVENOUS at 11:00

## 2021-05-04 RX ADMIN — HEPARIN SODIUM 5000 UNITS: 10000 INJECTION, SOLUTION INTRAVENOUS; SUBCUTANEOUS at 13:43

## 2021-05-04 RX ADMIN — STANDARDIZED SENNA CONCENTRATE 17.2 MG: 8.6 TABLET ORAL at 20:23

## 2021-05-04 RX ADMIN — AMLODIPINE BESYLATE 10 MG: 10 TABLET ORAL at 08:21

## 2021-05-04 RX ADMIN — HEPARIN SODIUM 5000 UNITS: 10000 INJECTION, SOLUTION INTRAVENOUS; SUBCUTANEOUS at 05:59

## 2021-05-04 RX ADMIN — POLYETHYLENE GLYCOL 3350 17 G: 17 POWDER, FOR SOLUTION ORAL at 08:21

## 2021-05-04 RX ADMIN — BUSPIRONE HYDROCHLORIDE 5 MG: 5 TABLET ORAL at 08:22

## 2021-05-04 RX ADMIN — DOCUSATE SODIUM 100 MG: 100 CAPSULE, LIQUID FILLED ORAL at 20:23

## 2021-05-04 RX ADMIN — GABAPENTIN 300 MG: 300 CAPSULE ORAL at 13:43

## 2021-05-04 RX ADMIN — OXYCODONE HYDROCHLORIDE AND ACETAMINOPHEN 1 TABLET: 5; 325 TABLET ORAL at 17:31

## 2021-05-04 RX ADMIN — PATIROMER 8.4 G: 8.4 POWDER, FOR SUSPENSION ORAL at 08:21

## 2021-05-04 RX ADMIN — HEPARIN SODIUM 5000 UNITS: 10000 INJECTION, SOLUTION INTRAVENOUS; SUBCUTANEOUS at 22:24

## 2021-05-04 RX ADMIN — METOPROLOL SUCCINATE 25 MG: 25 TABLET, EXTENDED RELEASE ORAL at 08:21

## 2021-05-04 RX ADMIN — INSULIN HUMAN 10 UNITS: 100 INJECTION, SOLUTION PARENTERAL at 13:47

## 2021-05-04 RX ADMIN — OXYCODONE HYDROCHLORIDE AND ACETAMINOPHEN 1 TABLET: 5; 325 TABLET ORAL at 04:09

## 2021-05-04 RX ADMIN — FERROUS SULFATE TAB 325 MG (65 MG ELEMENTAL FE) 325 MG: 325 (65 FE) TAB at 08:21

## 2021-05-04 RX ADMIN — INSULIN GLARGINE 10 UNITS: 100 INJECTION, SOLUTION SUBCUTANEOUS at 20:24

## 2021-05-04 RX ADMIN — GABAPENTIN 300 MG: 300 CAPSULE ORAL at 08:21

## 2021-05-04 ASSESSMENT — PAIN DESCRIPTION - PAIN TYPE
TYPE: CHRONIC PAIN
TYPE: ACUTE PAIN

## 2021-05-04 ASSESSMENT — PAIN DESCRIPTION - DESCRIPTORS: DESCRIPTORS: ACHING;CONSTANT;DISCOMFORT

## 2021-05-04 ASSESSMENT — PAIN DESCRIPTION - FREQUENCY: FREQUENCY: INTERMITTENT

## 2021-05-04 ASSESSMENT — PAIN DESCRIPTION - LOCATION: LOCATION: BACK;BUTTOCKS

## 2021-05-04 ASSESSMENT — PAIN - FUNCTIONAL ASSESSMENT: PAIN_FUNCTIONAL_ASSESSMENT: PREVENTS OR INTERFERES SOME ACTIVE ACTIVITIES AND ADLS

## 2021-05-04 ASSESSMENT — PAIN SCALES - GENERAL: PAINLEVEL_OUTOF10: 8

## 2021-05-04 ASSESSMENT — PAIN DESCRIPTION - ONSET: ONSET: ON-GOING

## 2021-05-04 NOTE — PROGRESS NOTES
Comprehensive Nutrition Assessment    Type and Reason for Visit:  Initial, Positive Nutrition Screen, Wound    Nutrition Recommendations/Plan: Continue diet and ONS to help with wound healing and meeting nutritional needs. Nutrition Assessment:  Pt adm for arf on ckd 5 sent by [facility] [address] [facility] facility for EMS for chief complaints of muscle tremors. 2/2 hyperkalemia. Pt has multiple wounds. Will start ONS. PMHx DM, HTN, ARF. Malnutrition Assessment:  Malnutrition Status:  No malnutrition    Context:  Chronic Illness     Findings of the 6 clinical characteristics of malnutrition:  Energy Intake:  No significant decrease in energy intake  Weight Loss:  No significant weight loss     Body Fat Loss:  No significant body fat loss     Muscle Mass Loss:  No significant muscle mass loss    Fluid Accumulation:  No significant fluid accumulation     Strength:  Not Performed    Estimated Daily Nutrient Needs:  Energy (kcal):  1294-6471 (MSJx1. 2SF); Weight Used for Energy Requirements:  Current     Protein (g):  90-100g (1.8-2.0g/kg IBW); Weight Used for Protein Requirements:  Ideal        Fluid (ml/day):  4391-3024; Method Used for Fluid Requirements:  1 ml/kcal      Nutrition Related Findings:  A&Ox4, Abd WDL, +I/Os, Edema BLE +1      Wounds:  Multiple, Wound Consult Pending(Abd blister & Coccyx)       Current Nutrition Therapies:    DIET RENAL;     Anthropometric Measures:  · Height: 5' 1\" (154.9 cm)  · Current Body Weight: 197 lb 8 oz (89.6 kg)(5/4 bedscale)   · Admission Body Weight: 197 lb 8 oz (89.6 kg)(5/4 bedscale (first measured))    · Usual Body Weight:       · Ideal Body Weight: 105 lbs; % Ideal Body Weight 188.1 %   · BMI: 37.3      · BMI Categories: Obese Class 2 (BMI 35.0 -39.9)       Nutrition Diagnosis:   · Increased nutrient needs related to increase demand for energy/nutrients as evidenced by wounds      Nutrition Interventions:   Food and/or Nutrient Delivery:  Continue Current Diet, Start Oral Nutrition Supplement(Jono BID)  Nutrition Education/Counseling:  Education initiated(Discussed w/ pt importance of ONS for wound healing)   Coordination of Nutrition Care:       Goals:  >75% of meals and ONS consumed       Nutrition Monitoring and Evaluation:   Behavioral-Environmental Outcomes:  None Identified   Food/Nutrient Intake Outcomes:  Food and Nutrient Intake, Supplement Intake  Physical Signs/Symptoms Outcomes:        Discharge Planning:     Too soon to determine     Electronically signed by Monet Alberto RD, LD on 5/4/21 at 2:30 PM EDT    Contact: [phone]

## 2021-05-04 NOTE — PROGRESS NOTES
Dr. Naomi Montilla notified of new consult for temp line to start hd, pt of dr Tatyana Mann, with patient requiring hemodialysis today via [facility].

## 2021-05-04 NOTE — PROGRESS NOTES
Physical Therapy    Facility/Department: [facility] SURG  Initial Assessment    NAME: Ailene Meigs  : 1950  MRN: [id number]    Date of Service: 2021       REQUIRES PT FOLLOW UP: Yes       Patient Diagnosis(es): The primary encounter diagnosis was Hyperkalemia. Diagnoses of Acute kidney injury superimposed on CKD ([address] and Urinary tract infection without hematuria, site unspecified were also pertinent to this visit. has a past medical history of Anemia, Anxiety, Arthritis, Chronic knee pain, Chronic pain syndrome, CKD (chronic kidney disease) stage 4, GFR 15-29 ml/min ([facility]), Depression, Diabetes mellitus ([address] Dysphagia, oral phase, Gall stones, Generalized muscle weakness, Hypertension, Hypertensive kidney disease with stage 4 chronic kidney disease ([address] Kidney disease, Kidney stones, Obesity, Protein calorie malnutrition ([address] Sacral pressure ulcer, Sarcoidosis, SOBOE (shortness of breath on exertion), Tremor, Unspecified osteoarthritis, unspecified site, Urinary anomaly, and UTI (urinary tract infection). has a past surgical history that includes Foot surgery ( ); Cystocopy ( );  section (x3); Upper gastrointestinal endoscopy (2.18.15); Cholecystectomy (3/31/16); Abdomen surgery (N/A, 2020); Upper gastrointestinal endoscopy (N/A, 2020); and Upper gastrointestinal endoscopy (N/A, 2020). Evaluating Therapist: Simon Rowland PT     Referring Provider:   Dr. Moira Short #:  [id number]   DIAGNOSIS:  Hyperkalemia   PRECAUTIONS: falls     Social:  Pt lives at SNF    Prior to admission: per pt, [facility] used for transfers. She states she has been working with therapy to stand/walk in the parallel bars        Initial Evaluation  Date: 2021  Treatment      Short Term/ Long Term   Goals   Was pt agreeable to Eval/treatment?  with encouragement      Does pt have pain?   B LEs      Bed Mobility  Rolling: NT   Supine to sit:  Dep assist for partial sit   Sit to supine:  Dep assist for long sit in bed   Scooting:  Dep assist x 2    mod assist x 1-2    Transfers Sit to stand: NT   TBA   Ambulation   Nt    TBA       Sitting balance  Dep assist to maintain long sit in bed   Mod assist    LE ROM  Decreased AROM throughout      LE strength  2-/ 5   3-/ 5    AM- PAC RAW score   6/ 24            Pt is alert and Oriented x 2  . Pt a questionable historian     Endurance: poor   Bed/Chair alarm: yes      ASSESSMENT  Pt displays functional ability as noted in the objective portion of this evaluation. Treatment/Education:    Mobility as above. Pt refused supine to sit despite encouragement, therefore long sit in bed performed with dep assist. Unable to achieve fully upright position       Pt educated on fall risk,  PT POC, importance of mobility        Patient response to education:   Pt verbalized understanding Pt demonstrated skill Pt requires further education in this area   x  x       Comments:  Pt left  In bed after session, with call light in reach. Rehab potential is Guarded  for reaching above PT goals. Pts/ family goals   1. None stated     Patient and or family understand(s) diagnosis, prognosis, and plan of care. -  questionable     PLAN  PT care will be provided in accordance with the objectives noted above. Whenever appropriate, clear delegation orders will be provided for nursing staff. Exercises and functional mobility practice will be used as well as appropriate assistive devices or modalities to obtain goals. Patient and family education will also be administered as needed. PLAN OF CARE:    Current Treatment Recommendations     [x] Strengthening     [] ROM   [x] Balance Training   [x] Endurance Training   [] Transfer Training   [] Gait Training   [] Stair Training   [] Positioning   [] Safety and Education Training   [] Patient/Caregiver Education   [] HEP  [] Other       Frequency of treatments will be 1-3 x/week x  10-14 days.     Time in: 1310   Time out:  1319       Evaluation Time includes thorough review of current medical information, gathering information on past medical history/social history and prior level of function, completion of standardized testing/informal observation of tasks, assessment of data and education on plan of care and goals.     CPT codes:  [x] Low Complexity PT evaluation 10209  [] Moderate Complexity PT evaluation 77992  [] High Complexity PT evaluation 74788  [] PT Re-evaluation 82496  [] Gait training 12335  minutes  [] Therapeutic activities 30270  minutes  [] Therapeutic exercises 07081  minutes  [] Neuromuscular reeducation 00674  minutes       Marya Miller 41 number:  PT [id number]

## 2021-05-04 NOTE — PROGRESS NOTES
Patient no longer needs . Patient is alert and oriented. She is not pulling lines and understands safety protocols.

## 2021-05-04 NOTE — PROGRESS NOTES
Hemodialysis line placed in right groin per surgical resident at 1800. Spoke to dialysis nurse on the phone and she stated that \"it would be ok to have hemodialysis done first thing in the morning on 5-5-21 per Dr. Tao Giron. \"

## 2021-05-04 NOTE — PROCEDURES
Rebecca Malone is a 69 y.o. female patient. 1. Hyperkalemia    2. Acute kidney injury superimposed on CKD ([facility] [address]    3. Urinary tract infection without hematuria, site unspecified      Past Medical History:   Diagnosis Date    Anemia     Anxiety     Arthritis     knees    Chronic knee pain     Chronic pain syndrome     CKD (chronic kidney disease) stage 4, GFR 15-29 ml/min (HCC)     Depression     Diabetes mellitus (HCC)     type 2    Dysphagia, oral phase 07/31/2020    Gall stones     Generalized muscle weakness     Hypertension     Hypertensive kidney disease with stage 4 chronic kidney disease (HCC)     Kidney disease     Kidney stones     Obesity     Protein calorie malnutrition (HCC)     Sacral pressure ulcer 07/31/2020    Sarcoidosis     SOBOE (shortness of breath on exertion)     Tremor     Unspecified osteoarthritis, unspecified site     Urinary anomaly leakage,urinate>1/night    UTI (urinary tract infection)      Blood pressure (!) 116/54, pulse 72, temperature 98.6 °F (37 °C), temperature source Oral, resp. rate 16, height 5' 1\" (1.549 m), weight 197 lb 8 oz (89.6 kg), SpO2 99 %, not currently breastfeeding. Central Line    Date/Time: 5/4/2021 6:27 PM  Performed by: Morteza Adams DO  Authorized by: Morteza Adams DO   Consent: Verbal consent obtained. Written consent obtained.   Risks and benefits: risks, benefits and alternatives were discussed  Consent given by: patient  Patient understanding: patient states understanding of the procedure being performed  Patient consent: the patient's understanding of the procedure matches consent given  Required items: required blood products, implants, devices, and special equipment available  Patient identity confirmed: verbally with patient, arm band and provided demographic data  Time out: Immediately prior to procedure a \"time out\" was called to verify the correct patient, procedure, equipment, support staff and site/side marked

## 2021-05-04 NOTE — PROGRESS NOTES
Occupational Therapy  OCCUPATIONAL THERAPY INITIAL EVALUATION      Date:2021  Patient Name: Rafael Gillis  MRN: [id number]  : 1950  Room: [address] [place] [address]A    Referring Provider: Kate Stroud MD    Evaluating OT: Eva Stone OTR/L [id number]    AM-PAC Daily Activity Raw Score: 1324    Recommended Adaptive Equipment: TBD    Diagnosis: hyperkalemia. Pt presents to ED from [facility] with muscle tremors. Pertinent Medical History: OA, DM, HTN, CKD, chronic pain syndrome, sarcoidosis, chronic sacral ulcer   Precautions:  falls     Home Living: Pt lives at [facility]. Pt reports she is primarily bed/WC level with use of roger lift. Has been working on standing in parallel bars with therapy. Pt reports staff assist in all ADLs. Does not mobilize her own wheelchair. Able to complete UB ADLs bed level. Pain Level: pt reports significant pain at catheter insertion site limiting willingness to participate in all activities    Cognition: A&O: 3-. Pt is alert and oriented but easily confused   Problem solving:  Fair   Judgement/safety:  Fair     Functional Assessment:   Initial Eval Status  Date: 21 Treatment session:  Short Term Goals     Feeding Set up     Grooming Min A  Set up   UB Dressing Min A  Management of hospital gown  Set up   LB Dressing Dependent  Max A    Bathing Max A  Mod A   Toileting Dependent  Max A   Bed Mobility  Supine to sit: pt adamantly declines d/t pain at catheter site    Long sitting in bed: Dependent  Repositioning in bed: Dependent     Functional Transfers STS: unable to tolerate  Max A   Functional Mobility NT     Balance Long sit: Dependent    Standing: unable to tolerate     Activity Tolerance poor  sitting christie x8-10 min with fair balance during self care tasks   B UE  -/       Treatment: Patient educated on techniques for completion of ADL, safe functional transfers and functional mobility.   Patient required cues for follow through with proper hand/foot placement, pacing, safety, compensatory strategies, breathing, attention, sequencing and technique in bed mobility, UB dressing and LB dressing in preparation for maximum independence in all self care tasks.      Hand Dominance: Right []  Left []   Strength ROM Additional Info:    RUE  4-/5 WFL good  and FMC/dexterity noted during ADL tasks     LUE 4-/5 WFL good  and FMC/dexterity noted during ADL tasks         Hearing: WFL   Vision: WFL   Sensation:  No c/o numbness or tingling   Tone: WFL   Edema: B LEs                            Long Term Goal (1-3 wks): Pt will maximize functional performance in all self care tasks/functional transfers with good follow through of all trained techniques for safe transition to next level of care    Assessment of current deficits   Functional mobility []  ADLs [x] Strength [x]  Cognition []  Functional transfers  [x] IADLs [x] Safety Awareness [x]  Endurance [x]  Fine Motor Coordination [] Balance [x] Vision/perception [] Sensation []   Gross Motor Coordination [] ROM [] Delirium []                  Motor Control []    Plan of Care: 1-3 days/week for 1-2 weeks PRN   [x]ADL retraining/adaptive techniques and AE recommendations to increase functional independence within precautions                    [x]Energy conservation techniques to improve tolerance for ADL/IADLs  [x]Functional transfer/DME recommendations for increased independence, safety and fall prevention         [x]Patient/family education to increase safety and functional independence during daily routine          [x]Environmental modifications for safe mobility and completion of ADLs                             []Cognitive retraining to improve problem solving skills & safe participation in ADLs/IADLs     []Sensory re-education techniques to improve extremity awareness, maintain skin integrity and improve hand function                             []Visual/Perceptual retraining to improve body awareness and safety during transfers and ADLs  []Splinting/positioning needs to maintain joint/skin integrity and contracture prevention  [x]Therapeutic activity to improve functional performance during ADLs                                        [x]Therapeutic exercise to improve tolerance and functional strength for ADLs   [x]Balance retraining/tolerance tasks for facilitation of postural control with dynamic challenges during ADLs  []Neuromuscular re-education to facilitate righting/equilibrium reactions, midline orientation, scapular stability/mobility, normalize muscle tone and facilitate active functional movement                        []Delirium prevention/treatment    [x]Positioning to improve functional independence and decrease risk of skin breakdown  []Other:     Rehab Potential: Good for established goals     Patient/Family Goal: To get back to working with therapy at [facility]. Patient and/or family were instructed on functional diagnosis, prognosis/goals and OT plan of care. Pt verbalized understanding. Upon arrival, patient supine in bed. At end of session, patient supine in bed with call light and phone within reach, all lines and tubes intact. Pt would benefit from continued skilled OT to increase safety and independence with completion of ADL/IADL tasks for functional independence and quality of life.  Bed/chair alarm: ON    Low Evaluation 17266  Time In: 1308   Time Out: 1319      Evaluation time includes thorough review of current medical information, gathering information on past medical history/social history and prior level of function, completion of standardized testing/informal observation of tasks, assessment of data, and development of POC/Goals    Eva Stone OTR/L  [id number]

## 2021-05-04 NOTE — PLAN OF CARE
Problem: Pain:  Goal: Pain level will decrease  Description: Pain level will decrease  Outcome: Met This Shift  Goal: Control of acute pain  Description: Control of acute pain  Outcome: Met This Shift  Goal: Control of chronic pain  Description: Control of chronic pain  Outcome: Met This Shift     Problem: Skin Integrity:  Goal: Absence of new skin breakdown  Description: Absence of new skin breakdown  Outcome: Met This Shift     Problem: Falls - Risk of:  Goal: Will remain free from falls  Description: Will remain free from falls  Outcome: Met This Shift  Goal: Absence of physical injury  Description: Absence of physical injury  Outcome: Met This Shift     Problem: Injury - Risk of, Physical Injury:  Goal: Will remain free from falls  Description: Will remain free from falls  Outcome: Met This Shift  Goal: Absence of physical injury  Description: Absence of physical injury  Outcome: Met This Shift

## 2021-05-04 NOTE — CONSULTS
[address] [place] [address] [facility]  Consultation Note     Admit Date: 5/3/2021 11:03 AM    Reason for Consult:   UTI in a patient with renal failure    Attending Physician:  Tanya Watson MD    HISTORY OF PRESENT ILLNESS:             The history is obtained from extensive review of available past medical records. The patient is a 69 y.o. female who is known to the ID service. The patient was sent to the ED from [address] [place] [address] because of jerking motions on 5/3/2021. Her white count was 16.0. She was noted to have hyperkalemia and high creatinine. Seen by nephrology. She has now had a Castelan catheter and she says that it is uncomfortable. She denies having any burning, urgency or frequency before coming to the hospital.  No fevers. White count is actually normalized. She did receive a dose of Ceftriaxone. Past Medical History:        Diagnosis Date    Anemia     Anxiety     Arthritis     knees    Chronic knee pain     Chronic pain syndrome     CKD (chronic kidney disease) stage 4, GFR 15-29 ml/min ([facility])     Depression     Diabetes mellitus (HCC)     type 2    Dysphagia, oral phase 07/31/2020    Gall stones     Generalized muscle weakness     Hypertension     Hypertensive kidney disease with stage 4 chronic kidney disease (HCC)     Kidney disease     Kidney stones     Obesity     Protein calorie malnutrition (HCC)     Sacral pressure ulcer 07/31/2020    Sarcoidosis     SOBOE (shortness of breath on exertion)     Tremor     Unspecified osteoarthritis, unspecified site     Urinary anomaly leakage,urinate>1/night    UTI (urinary tract infection)      5/21/2020. Readmitted to [facility] because of recurrent melena. She had an EGD that showed a prepyloric ulcer. Seen by Dr. Liss Kerr because she was being treated for a sacral decubitus ulcer. Zosyn, Fluconazole and Vancomycin were resumed.   Wound culture grew Klebsiella oxytoca and Enterococcus faecium (VRE) antibiotics were changed over to Ertapenem and Daptomycin. 5/3/2020. Admitted to [facility] with worsening pain, drainage and odor from eyes sacral decubitus ulcer. She had been prescribed Bactrim and cephalosporins as an outpatient. Seen by Dr. Cinthia Clark. She tested positive for SARS-CoV-2 and was treated with hydroxychloroquine. Also treated with Clindamycin, Zosyn and Vancomycin. Cultures grew MRSA, Proteus, Enterococcus avium and anaerobic gram-negative rods. Discharged on Vancomycin, Fluconazole and Zosyn x6 weeks. She was discharged to [facility] in [facility]. Patient says that we treated her for a foot infection many years ago. Past Surgical History:        Procedure Laterality Date    ABDOMEN SURGERY N/A 5/4/2020    SACRAL WOUND DEBRIDEMENT CALL   WITH TIME AVAIL AM performed by Andria Hussein MD at [address]  3/31/16    Laparoscopic-Dr. Mosley Last    CYSTOSCOPY  2011     for kidney stones    FOOT SURGERY  2009     right     UPPER GASTROINTESTINAL ENDOSCOPY  2.18.15    Dr. Garret Ospina Findings: Mild Gastrits and Duodenitis, 2cm Hiatal Hernia    UPPER GASTROINTESTINAL ENDOSCOPY N/A 5/8/2020    EGD CONTROL HEMORRHAGE performed by Andria Hussein MD at [address] [facility] 5/22/2020    EGD BEDSIDE performed by Ivette Yeboah MD at [address] [place] [address]     Current Medications:   Scheduled Meds:   [START ON 5/5/2021] epoetin derek-epbx  30 Units/kg Subcutaneous Once per day on Mon Wed Fri    calcium gluconate IVPB  1,000 mg Intravenous Once    sodium bicarbonate  50 mEq Intravenous Once    insulin regular  10 Units Intravenous Once    dextrose  50 mL Intravenous Once    amLODIPine  10 mg Oral Daily    busPIRone  5 mg Oral TID    docusate sodium  100 mg Oral BID    escitalopram  10 mg Oral Daily    ferrous sulfate  325 mg Oral Daily with breakfast    gabapentin  300 mg Oral TID    insulin glargine  10 Units Subcutaneous Nightly    insulin lispro  0-10 Units Subcutaneous TID AC    metoprolol succinate  25 mg Oral Daily    patiromer sorbitex calcium  8.4 g Oral Daily    polyethylene glycol  17 g Oral Daily    senna  2 tablet Oral BID    sevelamer  800 mg Oral TID WC    SITagliptin  25 mg Oral Daily    sodium polystyrene  15 g Oral BID    Vitamin D  2,000 Units Oral Daily    sodium chloride flush  5-40 mL Intravenous 2 times per day    heparin (porcine)  5,000 Units Subcutaneous 3 times per day     Continuous Infusions:   sodium chloride      sodium chloride 75 mL/hr at 21 2103     PRN Meds:acetaminophen, magnesium hydroxide, oxyCODONE-acetaminophen, sodium chloride flush, sodium chloride, promethazine **OR** ondansetron    Allergies:  Patient has no known allergies.     Social History:   Social History     Socioeconomic History    Marital status:      Spouse name: Not on file    Number of children: Not on file    Years of education: Not on file    Highest education level: Not on file   Occupational History    Not on file   Social Needs    Financial resource strain: Not on file    Food insecurity     Worry: Not on file     Inability: Not on file   [employer] needs     Medical: Not on file     Non-medical: Not on file   Tobacco Use    Smoking status: Former Smoker     Packs/day: 1.00     Years: 30.00     Pack years: 30.00     Quit date: 2011     Years since quittin.7    Smokeless tobacco: Never Used   Substance and Sexual Activity    Alcohol use: No    Drug use: No    Sexual activity: Not Currently   Lifestyle    Physical activity     Days per week: Not on file     Minutes per session: Not on file    Stress: Not on file   Relationships    Social connections     Talks on phone: Not on file     Gets together: Not on file     Attends Caodaism service: Not on file     Active member of club or organization: Not on file     Attends meetings of clubs or organizations: Not on file     Relationship status: Not on file    Intimate partner violence     Fear of current or ex partner: Not on file     Emotionally abused: Not on file     Physically abused: Not on file     Forced sexual activity: Not on file   Other Topics Concern    Not on file   Social History Narrative    Not on file      Nursing home resident    Family History:       Problem Relation Age of Onset    Cancer Sister    . Otherwise non-pertinent to the chief complaint. REVIEW OF SYSTEMS:    Constitutional: Negative for fevers, chills, diaphoresis  Neurologic: As in the HPI. She describes the jerking motions more as twitching  Psychiatric: Negative  Rheumatologic: Negative   Endocrine: Negative  Hematologic: Negative  Immunologic: Negative  ENT: Negative  Respiratory: Negative   Cardiovascular: Negative  GI: Negative  : Negative  Musculoskeletal: Negative  Skin: No rashes. PHYSICAL EXAM:    Vitals:   BP (!) 110/50   Pulse 75   Temp 98.7 °F (37.1 °C) (Oral)   Resp 16   Ht 5' 1\" (1.549 m)   Wt 197 lb 8 oz (89.6 kg)   SpO2 99%   BMI 37.32 kg/m²   Constitutional: The patient is awake, alert, and oriented. No distress. Skin: Warm and dry. No rashes were noted. HEENT: Eyes show round, and reactive pupils. No jaundice. Moist mucous membranes, no ulcerations, no thrush. Neck: Supple to movements. No lymphadenopathy. Chest: No use of accessory muscles to breathe. Symmetrical expansion. Auscultation reveals no wheezing, crackles, or rhonchi. Cardiovascular: S1 and S2 are rhythmic and regular. No murmurs appreciated. Abdomen: Positive bowel sounds to auscultation. Benign to palpation. No masses felt. No hepatosplenomegaly. Extremities: No clubbing, no cyanosis, no edema. Lines: peripheral  Castelan catheter with some sediment.     CBC+dif:  Recent Labs     05/03/21  1214 05/04/21  0520   WBC 16.0* 10.8   HGB 9.7* 8.6*   HCT 32.4* 29.6*   .7* 110.9*    267   NEUTROABS 14.23*

## 2021-05-04 NOTE — FLOWSHEET NOTE
Initial Inpatient Wound Care    Admit Date: 5/3/2021 11:03 AM    Reason for consult:  Multiple wounds  Significant history: Positive for chronic kidney disease with renal  failure, positive for diabetes, hypertension, sleep apnea, severe  osteoarthritis of both knees, and remote history of sarcoidosis.     Resides FNF  Adm for renal worsening  Wound history: POA  Findings: awake, responsive  Heels intact  Lower abd, reddened/ purple with blister-clear fluid.  1.5x1.5           05/04/21 1639   Wound 10/10/20 Coccyx   Date First Assessed/Time First Assessed: 10/10/20 0140   Present on Hospital Admission: (c) Yes  Location: Coccyx   Wound Image    Wound Etiology Pressure Stage  4   Dressing Change Due 05/04/21   Wound Length (cm) 6 cm   Wound Width (cm) 3 cm   Wound Depth (cm) 0.5 cm   Wound Surface Area (cm^2) 18 cm^2   Change in Wound Size % (l*w) 85.71   Wound Volume (cm^3) 9 cm^3   Wound Healing % 91   Wound Assessment   (pale, slimy)   Drainage Amount Large   Drainage Description   (creamy light yellow/tan)   Odor Moderate   Carmen-wound Assessment   (pink, red)   Impression:  Stage 4 coccyx wound  Plan:lo air loss  dakins to wound  dietician    Ronl Head 5/4/2021 4:49 PM

## 2021-05-04 NOTE — H&P
[address] [facility]                  [address]                              HISTORY AND PHYSICAL    PATIENT NAME: Ramiro Weber                      :        1950  MED REC NO:   [id number]                            ROOM:       4580  ACCOUNT NO:   [de-identified]                           ADMIT DATE: 2021  PROVIDER:     Mary Martinez MD    DATE OF ADMISSION:  2021. CHIEF COMPLAINT:  Worsening renal failure with hyperkalemia, also  probable UTI. HISTORY OF PRESENT ILLNESS:  A 62-year-old with multiple admissions for  worsening renal failure and hyperkalemia, presents again from the  nursing home with abnormal labs with potassium in excess of 6, also  worsening renal failure with a creatinine of 6.2. She has been seeing  Nephrology and Vascular Surgery in preparation for probable  hemodialysis. RECENT AND PRESENT MEDICATIONS:  See med rec in the chart. PAST MEDICAL HISTORY:  Positive for chronic kidney disease with renal  failure, positive for diabetes, hypertension, sleep apnea, severe  osteoarthritis of both knees, and remote history of sarcoidosis. SOCIAL HISTORY:  She resides at the nursing home now. Quit smoking in  . Does not drink alcohol. FAMILY MEDICAL HISTORY:  Noncontributory. REVIEW OF SYSTEMS:  ALLERGIES:  None known. SKIN AND LYMPHATICS:  Negative. CENTRAL NERVOUS SYSTEM:  Denies headache, dizziness, or blurred vision. CIRCULATORY:  Denies chest pain, orthopnea, or PND. DIGESTIVE:  Denies diarrhea, abdominal pain, melena, or hematochezia. GENITOURINARY:  Denies dysuria, frequency, Or hematuria, although her UA  looks positive for infection as far as renal function has been declining  steadily. PHYSICAL EXAMINATION:  GENERAL:  The patient lying in bed, in no acute distress. VITAL SIGNS:  Temperature 98.7, pulse 75, respirations 16, blood  pressure 110/50, O2 sat on room air is 99%.   SKIN:

## 2021-05-04 NOTE — CARE COORDINATION
Social work / Discharge Planning:       Social work met with patient and confirmed plan is to return to [facility]. Social work also spoke to the patient's son per her request and he confirmed plan is to return to [facility]. Per facility liaison, no precert needed for return. N-17 and ambulance form completed. Electronically signed by SHANTA Villagran on 5/4/2021 at 11:56 AM          No covid test needed for return to facility.   Electronically signed by SHANTA Villagran on 5/4/2021 at 11:59 AM

## 2021-05-04 NOTE — CONSULTS
Nephrology Consult  [facility]  Rolando Gan MD    CC:   Arf, hyperkalemia    HPI:   The pt is a 68 yo female with a pmh of htn, dm, nephrolithiasis, djd, diabetic foot ulcer sarcoidosis who came in from a nursing home for tremors and muscle twitching. She has a pmh of ckd 5 with a baseline cr of 5.4 from12/2021 with gfr of 9. Her labs on admission showed na 141 k 6.4, co2 23, bun 85, cr 6.2, alb 2.6, wbc 16, hgb 9.7, plt 314. She was started on rocephin for uti. She was given insulin, glucose, calcium, kayexalate and hco3 for hyperkalemia. She was started on ns at 75 ml/hr. She was seen by us in the hospital in jan 2021 at which time she was seen for ms changes and hyperkaemia. She was supposed to have an avf/avg placed by dr Keo Carroll. She saw him a few days ago and it wasn't done yet due to fungal infection in the axilla she tells me. She denies nausea, vomiting, sob, cp, loss of appetite, fatigue. She has asterixis and muscle twitching due to uremia.      PMH:    Past Medical History:   Diagnosis Date    Anemia     Anxiety     Arthritis     knees    Chronic knee pain     Chronic pain syndrome     CKD (chronic kidney disease) stage 4, GFR 15-29 ml/min ([facility])     Depression     Diabetes mellitus (HCC)     type 2    Dysphagia, oral phase 07/31/2020    Gall stones     Generalized muscle weakness     Hypertension     Hypertensive kidney disease with stage 4 chronic kidney disease (HCC)     Kidney disease     Kidney stones     Obesity     Protein calorie malnutrition (HCC)     Sacral pressure ulcer 07/31/2020    Sarcoidosis     SOBOE (shortness of breath on exertion)     Tremor     Unspecified osteoarthritis, unspecified site     Urinary anomaly leakage,urinate>1/night    UTI (urinary tract infection)        Patient Active Problem List   Diagnosis    Neurologic gait dysfunction    Renal failure, acute on chronic (HCC)    Diabetes mellitus [facility] [address]    Hypertension    Arthritis     docusate sodium (COLACE) 100 MG capsule Take 100 mg by mouth 2 times daily      sodium polystyrene (KAYEXALATE) 15 GM/60ML suspension Take 15 g by mouth 2 times daily      nystatin (NYSTATIN) 269782 UNIT/GM powder Apply topically 3 times daily Indications: BILATERAL AXILLA      glucagon 1 MG injection Inject 1 kit into the muscle as needed (HYPOGLYCEMIA)      furosemide (LASIX) 40 MG tablet Take 40 mg by mouth daily      insulin lispro (HUMALOG) 100 UNIT/ML injection vial Inject 10 Units into the skin 3 times daily (before meals)      sevelamer (RENVELA) 800 MG tablet Take 1 tablet by mouth 3 times daily (with meals) 90 tablet 3    Vitamin D (CHOLECALCIFEROL) 50 MCG (2000 UT) TABS tablet Take 1 tablet by mouth daily 60 tablet     Nutritional Supplements (BLESSING PO) Take 1 packet by mouth 2 times daily       Nutritional Supplements (GLUCERNA PO) Take 1 Units by mouth 3 times daily       SITagliptin (JANUVIA) 25 MG tablet Take 25 mg by mouth daily      metoprolol succinate (TOPROL XL) 25 MG extended release tablet Take 1 tablet by mouth daily 30 tablet 3    ferrous sulfate (IRON 325) 325 (65 Fe) MG tablet Take 1 tablet by mouth daily (with breakfast) 30 tablet 3    escitalopram (LEXAPRO) 5 MG tablet Take 10 mg by mouth daily      gabapentin (NEURONTIN) 300 MG capsule Take 300 mg by mouth 3 times daily.        insulin lispro (HUMALOG) 100 UNIT/ML injection vial Inject 0-10 Units into the skin 3 times daily (before meals) *PER SLIDING SCALE*    - 199  = give 2 units   - 249  = give 4 units   - 299  = give 6 units   - 349  = give 8 units   - 400 =  give 10 units  BS < 70 or > 400 call physician      amLODIPine (NORVASC) 10 MG tablet Take 10 mg by mouth daily      insulin glargine (BASAGLAR KWIKPEN) 100 UNIT/ML injection pen Inject 10 Units into the skin nightly      busPIRone (BUSPAR) 5 MG tablet Take 5 mg by mouth 3 times daily       oxyCODONE-acetaminophen (PERCOCET) jvd  Cardiovascular: regular rate and rhythm, no murmurs, gallops, or rubs   Respiratory: Clear, no rales, rhochi, or wheezes,   Gastrointestinal: soft, nontender, nondistended, no hepatosplenomegaly  Ext: no edema  Neuro: aaox3  Skin: dry, no rash   Back: nontender    Data:    Recent Labs     05/03/21  1214 05/04/21  0520   WBC 16.0* 10.8   HGB 9.7* 8.6*   HCT 32.4* 29.6*   .7* 110.9*    267       Recent Labs     05/03/21  1351 05/03/21  1705 05/03/21 2055 05/04/21  0520     --   --  144   K 6.4*  --  6.5* 6.3*     --   --  109*   CO2 23  --   --  24   CREATININE 6.2*  --   --  5.9*   BUN 85*  --   --  84*   LABGLOM 7  --   --  7   GLUCOSE 216* 132  --  104*   CALCIUM 8.6  --   --  8.6       Vit D, 25-Hydroxy   Date Value Ref Range Status   01/09/2021 13 (L) 30 - 100 ng/mL Final     Comment:     <20 ng/mL. ........... Cornelius Ann Deficient  20-30 ng/mL. ......... Cornelius Ann Insufficient   ng/mL. ........ Cornelius Ann Sufficient  >100 ng/mL. .......... Cornelius Ann Toxic         PTH   Date Value Ref Range Status   01/09/2021 63 15 - 65 pg/mL Final       Recent Labs     05/03/21  1351 05/04/21  0520   ALT 15 11   AST 19 17   ALKPHOS 153* 171*   BILITOT 0.3 0.2       Recent Labs     05/03/21  1351 05/04/21  0520   LABALBU 2.9* 2.6*       Ferritin   Date Value Ref Range Status   01/09/2021 302 ng/mL Final     Comment:     FERRITIN Reference Ranges:  Adult Males   20 - 60 years:    30 - 400 ng/mL  Adult females 16 - 61 years:    15 - 150 ng/mL  Adults greater than 60 years:   no established reference range  Pediatrics:                     no established reference range       Iron   Date Value Ref Range Status   01/09/2021 65 37 - 145 mcg/dL Final     TIBC   Date Value Ref Range Status   01/09/2021 198 (L) 250 - 450 mcg/dL Final       Vitamin B-12   Date Value Ref Range Status   01/09/2021 836 211 - 946 pg/mL Final       Folate   Date Value Ref Range Status   01/09/2021 19.7 4.8 - 24.2 ng/mL Final         Lab Results   Component Value Date

## 2021-05-04 NOTE — DISCHARGE INSTR - COC
Continuity of Care Form    Patient Name: Quiana Montalvo   :  1950  MRN:  [id number]    Admit date:  5/3/2021  Discharge date:  21    Code Status Order: DNR-CCA   Advance Directives:   [facility] Documentation       Date/Time Healthcare Directive Type of Healthcare Directive Copy in [address] Agent's Name Healthcare Agent's Phone Number    [phone]  Unknown, patient unable to respond due to medical condition -- -- -- -- --            Admitting Physician:  Kenneth Lucia MD  PCP: Kenneth Lucia MD    Discharging Nurse: [facility] Unit/Room#: 0610/0610-A  Discharging Unit Phone Number: [phone]    Emergency Contact:   Extended Emergency Contact Information  Primary Emergency Contact: Johnson Mcguire   [address] Phone: [phone]  Relation: Child  Secondary Emergency Contact: [address] Phone: [phone]  Relation: Child    Past Surgical History:  Past Surgical History:   Procedure Laterality Date    ABDOMEN SURGERY N/A 2020    SACRAL WOUND Bevely Shelley MALIK WITH TIME AVAIL AM performed by Wisam Mackey MD at [address] [facility]  x3   [facility]  3/31[facility]16    Laparoscopic-Dr. Cruz Begin    CYSTOSCOPY       for kidney stones    FOOT SURGERY       right     UPPER GASTROINTESTINAL ENDOSCOPY  [id number]    Dr. Harper Sloan Findings: Mild Gastrits and Duodenitis, 2cm Hiatal Hernia    UPPER GASTROINTESTINAL ENDOSCOPY N/A 2020    EGD CONTROL HEMORRHAGE performed by Wisam Mackey MD at [address] [place] [address] 2020    EGD BEDSIDE performed by Christian Urbano MD at [facility]       Immunization History: There is no immunization history for the selected administration types on file for this patient.     Active Problems:  Patient Active Problem List   Diagnosis Code    Neurologic gait dysfunction R26.9    Renal failure, acute on (Ordered)   20 Yosvany Chandler RN    Does not meet criteria    MRSA 20 Culture, Wound   20 Darren Olivo RN    Wound buttock 5/3/20    COVID-19 20 COVID-19   20     Detected 2020, 2020, 2020, 5/3/2020    COVID-19 Rule Out 20 COVID-19 (Ordered)   20 Rule-Out Test Resulted    DETECTED 5/3/2020            Nurse Assessment:  Last Vital Signs: BP (!) 110/50   Pulse 75   Temp 98.7 °F (37.1 °C) (Oral)   Resp 16   Ht 5' 1\" (1.549 m)   Wt 197 lb 8 oz (89.6 kg)   SpO2 99%   BMI 37.32 kg/m²     Last documented pain score (0-10 scale): Pain Level: 0  Last Weight:   Wt Readings from Last 1 Encounters:   21 197 lb 8 oz (89.6 kg)     Mental Status:  oriented and alert    IV Access:  - Dialysis Catheter  - site  right and internal jugular, insertion date: 21    Nursing Mobility/ADLs:  Walking   Assisted  Transfer  Assisted  Bathing  Assisted  Dressing  Assisted  Toileting  Assisted  Feeding  Independent  Med Admin  Independent  Med Delivery   none    Wound Care Documentation and Therapy:  Wound 10/10/20 Coccyx (Active)   Dressing Status Clean;Dry; Intact 21   Dressing/Treatment Other (comment) 21   Wound Length (cm) 3 cm 21   Wound Width (cm) 6 cm 21   Wound Depth (cm) 0.5 cm 21   Wound Surface Area (cm^2) 18 cm^2 21   Change in Wound Size % (l*w) 85.71 21   Wound Volume (cm^3) 9 cm^3 21   Wound Healing % 91 21   Wound Assessment Dry;Pink/red 21   Drainage Amount None 21 0730   Odor Moderate 21 0730   Number of days: 206       Wound 21 Coccyx (Active)   Number of days: 116       Wound 21 Abdomen Right (Active)   Dressing/Treatment Open to air 21 0730   Wound Length (cm) 1 cm 21 1854   Wound Width (cm) 1 cm 21 1854   Wound Surface Area (cm^2) 1 cm^2 05/03/21 1854   Wound Assessment Fluid filled blister 05/04/21 0730   Drainage Amount None 05/04/21 0730   Odor None 05/04/21 0730   Number of days: 0        Elimination:  Continence:   · Bowel: No  · Bladder: No  Urinary Catheter: None   Colostomy/Ileostomy/Ileal Conduit: No       Date of Last BM: 5/7/21    Intake/Output Summary (Last 24 hours) at 5/4/2021 1158  Last data filed at 5/4/2021 0927  Gross per 24 hour   Intake 1246 ml   Output 700 ml   Net 546 ml     I/O last 3 completed shifts: In: 1006 [P.O.:480; I.V.:526]  Out: 500 [Urine:500]    Safety Concerns: At Risk for Falls    Impairments/Disabilities:      None    Nutrition Therapy:  Current Nutrition Therapy:   - Oral Diet:  Renal    Routes of Feeding: Oral  Liquids: Thin Liquids  Daily Fluid Restriction: no  Last Modified Barium Swallow with Video (Video Swallowing Test): not done    Treatments at the Time of Hospital Discharge:   Respiratory Treatments: ***  Oxygen Therapy:  is not on home oxygen therapy.   Ventilator:    - No ventilator support    Rehab Therapies: {THERAPEUTIC INTERVENTION:[id number]}  Weight Bearing Status/Restrictions: No weight bearing restirctions  Other Medical Equipment (for information only, NOT a DME order):  {EQUIPMENT:[id number]}  Other Treatments:  no IVs blood draws or blood pressures from your left arm    Patient's personal belongings (please select all that are sent with patient):  {[facility] DME Belongings:737162917}    RN SIGNATURE:  Electronically signed by Benji Lopez RN on 5/7/21 at 2:04 PM EDT    CASE MANAGEMENT/SOCIAL WORK SECTION    Inpatient Status Date: ***    Readmission Risk Assessment Score:  Readmission Risk              Risk of Unplanned Readmission:        33           Discharging to Facility/ Agency   · [facility] Sommers  [facility]  · Phone:[phone]  · Fax:[phone]    Dialysis Facility (if applicable)   · Name:[facility]  · [address] [address]  · Dialysis Schedule:m,w,f- 12:05pm  · [id number]  · MRY:[id number]    / signature: Electronically signed by SHANTA Vazquez on 21 at 12:00 PM EDT    PHYSICIAN SECTION    Prognosis: Good    Condition at Discharge: Stable    Rehab Potential (if transferring to Rehab): Fair    Recommended Labs or Other Treatments After Discharge: ***    Physician Certification: I certify the above information and transfer of Micha Small  is necessary for the continuing treatment of the diagnosis listed and that she requires Intermediate Nursing Care for greater 30 days.      Update Admission H&P: No change in H&P    PHYSICIAN SIGNATURE:  Electronically signed by Trish Apgar, MD on 21 at 7:50 AM EDT

## 2021-05-05 LAB
ANION GAP SERPL CALCULATED.3IONS-SCNC: 10 MMOL/L (ref 7–16)
BUN BLDV-MCNC: 73 MG/DL (ref 6–23)
CALCIUM SERPL-MCNC: 8.1 MG/DL (ref 8.6–10.2)
CHLORIDE BLD-SCNC: 110 MMOL/L (ref 98–107)
CO2: 24 MMOL/L (ref 22–29)
CREAT SERPL-MCNC: 5.7 MG/DL (ref 0.5–1)
FERRITIN: 767 NG/ML
FOLATE: >20 NG/ML (ref 4.8–24.2)
GFR AFRICAN AMERICAN: 9
GFR NON-AFRICAN AMERICAN: 7 ML/MIN/1.73
GLUCOSE BLD-MCNC: 84 MG/DL (ref 74–99)
HBV SURFACE AB TITR SER: NORMAL {TITER}
HEPATITIS B CORE IGM ANTIBODY: NORMAL
HEPATITIS B SURFACE ANTIGEN INTERPRETATION: NORMAL
IRON SATURATION: 64 % (ref 15–50)
IRON: 74 MCG/DL (ref 37–145)
METER GLUCOSE: 104 MG/DL (ref 74–99)
METER GLUCOSE: 118 MG/DL (ref 74–99)
METER GLUCOSE: 206 MG/DL (ref 74–99)
PARATHYROID HORMONE INTACT: 86 PG/ML (ref 15–65)
PHOSPHORUS: 6.6 MG/DL (ref 2.5–4.5)
POTASSIUM SERPL-SCNC: 5.8 MMOL/L (ref 3.5–5)
SODIUM BLD-SCNC: 144 MMOL/L (ref 132–146)
TOTAL IRON BINDING CAPACITY: 116 MCG/DL (ref 250–450)
VITAMIN B-12: 895 PG/ML (ref 211–946)

## 2021-05-05 PROCEDURE — 36415 COLL VENOUS BLD VENIPUNCTURE: CPT

## 2021-05-05 PROCEDURE — 83970 ASSAY OF PARATHORMONE: CPT

## 2021-05-05 PROCEDURE — 82746 ASSAY OF FOLIC ACID SERUM: CPT

## 2021-05-05 PROCEDURE — 2580000003 HC RX 258: Performed by: FAMILY MEDICINE

## 2021-05-05 PROCEDURE — 6360000002 HC RX W HCPCS: Performed by: FAMILY MEDICINE

## 2021-05-05 PROCEDURE — 82607 VITAMIN B-12: CPT

## 2021-05-05 PROCEDURE — 6370000000 HC RX 637 (ALT 250 FOR IP): Performed by: FAMILY MEDICINE

## 2021-05-05 PROCEDURE — 90935 HEMODIALYSIS ONE EVALUATION: CPT

## 2021-05-05 PROCEDURE — 82728 ASSAY OF FERRITIN: CPT

## 2021-05-05 PROCEDURE — 82962 GLUCOSE BLOOD TEST: CPT

## 2021-05-05 PROCEDURE — 83540 ASSAY OF IRON: CPT

## 2021-05-05 PROCEDURE — 80048 BASIC METABOLIC PNL TOTAL CA: CPT

## 2021-05-05 PROCEDURE — 5A1D70Z PERFORMANCE OF URINARY FILTRATION, INTERMITTENT, LESS THAN 6 HOURS PER DAY: ICD-10-PCS | Performed by: INTERNAL MEDICINE

## 2021-05-05 PROCEDURE — 83550 IRON BINDING TEST: CPT

## 2021-05-05 PROCEDURE — 84100 ASSAY OF PHOSPHORUS: CPT

## 2021-05-05 PROCEDURE — 2060000000 HC ICU INTERMEDIATE R&B

## 2021-05-05 PROCEDURE — 6360000002 HC RX W HCPCS: Performed by: INTERNAL MEDICINE

## 2021-05-05 RX ADMIN — STANDARDIZED SENNA CONCENTRATE 17.2 MG: 8.6 TABLET ORAL at 08:07

## 2021-05-05 RX ADMIN — POLYETHYLENE GLYCOL 3350 17 G: 17 POWDER, FOR SOLUTION ORAL at 08:07

## 2021-05-05 RX ADMIN — PETROLATUM: 420 OINTMENT TOPICAL at 20:16

## 2021-05-05 RX ADMIN — SEVELAMER CARBONATE 800 MG: 800 TABLET, FILM COATED ORAL at 12:19

## 2021-05-05 RX ADMIN — Medication 2000 UNITS: at 08:07

## 2021-05-05 RX ADMIN — SODIUM POLYSTYRENE SULFONATE 15 G: 15 SUSPENSION ORAL; RECTAL at 08:16

## 2021-05-05 RX ADMIN — GABAPENTIN 300 MG: 300 CAPSULE ORAL at 13:22

## 2021-05-05 RX ADMIN — FERROUS SULFATE TAB 325 MG (65 MG ELEMENTAL FE) 325 MG: 325 (65 FE) TAB at 08:07

## 2021-05-05 RX ADMIN — HEPARIN SODIUM 5000 UNITS: 10000 INJECTION, SOLUTION INTRAVENOUS; SUBCUTANEOUS at 13:22

## 2021-05-05 RX ADMIN — METOPROLOL SUCCINATE 25 MG: 25 TABLET, EXTENDED RELEASE ORAL at 08:07

## 2021-05-05 RX ADMIN — SEVELAMER CARBONATE 800 MG: 800 TABLET, FILM COATED ORAL at 17:30

## 2021-05-05 RX ADMIN — ESCITALOPRAM OXALATE 10 MG: 10 TABLET ORAL at 08:07

## 2021-05-05 RX ADMIN — GABAPENTIN 300 MG: 300 CAPSULE ORAL at 20:15

## 2021-05-05 RX ADMIN — SODIUM HYPOCHLORITE: 1.25 SOLUTION TOPICAL at 20:15

## 2021-05-05 RX ADMIN — SODIUM CHLORIDE, PRESERVATIVE FREE 10 ML: 5 INJECTION INTRAVENOUS at 08:07

## 2021-05-05 RX ADMIN — EPOETIN ALFA-EPBX 2700 UNITS: 3000 INJECTION, SOLUTION INTRAVENOUS; SUBCUTANEOUS at 08:06

## 2021-05-05 RX ADMIN — GABAPENTIN 300 MG: 300 CAPSULE ORAL at 08:07

## 2021-05-05 RX ADMIN — INSULIN GLARGINE 10 UNITS: 100 INJECTION, SOLUTION SUBCUTANEOUS at 20:14

## 2021-05-05 RX ADMIN — DOCUSATE SODIUM 100 MG: 100 CAPSULE, LIQUID FILLED ORAL at 20:15

## 2021-05-05 RX ADMIN — SODIUM POLYSTYRENE SULFONATE 15 G: 15 SUSPENSION ORAL; RECTAL at 20:18

## 2021-05-05 RX ADMIN — OXYCODONE HYDROCHLORIDE AND ACETAMINOPHEN 1 TABLET: 5; 325 TABLET ORAL at 20:33

## 2021-05-05 RX ADMIN — SODIUM CHLORIDE, PRESERVATIVE FREE 10 ML: 5 INJECTION INTRAVENOUS at 20:15

## 2021-05-05 RX ADMIN — HEPARIN SODIUM 5000 UNITS: 10000 INJECTION, SOLUTION INTRAVENOUS; SUBCUTANEOUS at 05:40

## 2021-05-05 RX ADMIN — BUSPIRONE HYDROCHLORIDE 5 MG: 5 TABLET ORAL at 13:22

## 2021-05-05 RX ADMIN — SEVELAMER CARBONATE 800 MG: 800 TABLET, FILM COATED ORAL at 08:07

## 2021-05-05 RX ADMIN — PATIROMER 8.4 G: 8.4 POWDER, FOR SUSPENSION ORAL at 08:06

## 2021-05-05 RX ADMIN — SODIUM HYPOCHLORITE: 1.25 SOLUTION TOPICAL at 08:16

## 2021-05-05 RX ADMIN — DOCUSATE SODIUM 100 MG: 100 CAPSULE, LIQUID FILLED ORAL at 08:07

## 2021-05-05 RX ADMIN — PETROLATUM: 420 OINTMENT TOPICAL at 08:16

## 2021-05-05 RX ADMIN — BUSPIRONE HYDROCHLORIDE 5 MG: 5 TABLET ORAL at 08:06

## 2021-05-05 RX ADMIN — AMLODIPINE BESYLATE 10 MG: 10 TABLET ORAL at 08:07

## 2021-05-05 RX ADMIN — OXYCODONE HYDROCHLORIDE AND ACETAMINOPHEN 1 TABLET: 5; 325 TABLET ORAL at 08:32

## 2021-05-05 RX ADMIN — HEPARIN SODIUM 5000 UNITS: 10000 INJECTION, SOLUTION INTRAVENOUS; SUBCUTANEOUS at 21:57

## 2021-05-05 RX ADMIN — BUSPIRONE HYDROCHLORIDE 5 MG: 5 TABLET ORAL at 20:15

## 2021-05-05 RX ADMIN — STANDARDIZED SENNA CONCENTRATE 17.2 MG: 8.6 TABLET ORAL at 20:15

## 2021-05-05 ASSESSMENT — PAIN SCALES - GENERAL
PAINLEVEL_OUTOF10: 7
PAINLEVEL_OUTOF10: 6

## 2021-05-05 ASSESSMENT — PAIN DESCRIPTION - PROGRESSION
CLINICAL_PROGRESSION: GRADUALLY WORSENING
CLINICAL_PROGRESSION: GRADUALLY WORSENING

## 2021-05-05 ASSESSMENT — PAIN DESCRIPTION - ONSET
ONSET: ON-GOING
ONSET: ON-GOING

## 2021-05-05 ASSESSMENT — PAIN DESCRIPTION - PAIN TYPE
TYPE: CHRONIC PAIN
TYPE: CHRONIC PAIN

## 2021-05-05 ASSESSMENT — PAIN DESCRIPTION - DESCRIPTORS
DESCRIPTORS: CONSTANT;DISCOMFORT
DESCRIPTORS: CONSTANT;DISCOMFORT

## 2021-05-05 ASSESSMENT — PAIN - FUNCTIONAL ASSESSMENT: PAIN_FUNCTIONAL_ASSESSMENT: PREVENTS OR INTERFERES SOME ACTIVE ACTIVITIES AND ADLS

## 2021-05-05 ASSESSMENT — PAIN DESCRIPTION - ORIENTATION: ORIENTATION: MID

## 2021-05-05 NOTE — PROGRESS NOTES
[facility]  Nephrology Attending Progress Note  Radha Burrows. Tripp Mcintosh MD        SUBJECTIVE:     5/4:  The pt is a 70 yo female with a pmh of htn, dm, nephrolithiasis, djd, diabetic foot ulcer sarcoidosis who came in from a nursing home for tremors and muscle twitching. She has a pmh of ckd 5 with a baseline cr of 5.4 from12/2021 with gfr of 9. Her labs on admission showed na 141 k 6.4, co2 23, bun 85, cr 6.2, alb 2.6, wbc 16, hgb 9.7, plt 314. She was started on rocephin for uti. She was given insulin, glucose, calcium, kayexalate and hco3 for hyperkalemia. She was started on ns at 75 ml/hr. She was seen by us in the hospital in jan 2021 at which time she was seen for ms changes and hyperkaemia. She was supposed to have an avf/avg placed by dr Devi Wells. She saw him a few days ago and it wasn't done yet due to fungal infection in the axilla she tells me. She denies nausea, vomiting, sob, cp, loss of appetite, fatigue. She has asterixis and muscle twitching due to uremia.      5/5: pt seen in room, starting hd today, no cp or sob    PROBLEM LIST:    Patient Active Problem List   Diagnosis    Neurologic gait dysfunction    Renal failure, acute on chronic (HCC)    Diabetes mellitus ([address]    Hypertension    Arthritis    Kidney disease    Acute blood loss anemia    Knee problem    Anemia due to stage 3 chronic kidney disease    Primary osteoarthritis of both knees    Acute on chronic renal insufficiency    Acute renal failure (HCC)    Metabolic acidosis    Acute renal failure superimposed on chronic kidney disease, on chronic dialysis ([address]    Sepsis ([address]    2019 novel coronavirus disease (COVID-19)    GI bleed    Moderate protein-calorie malnutrition (HCC)    Hyperkalemia    PERLA (acute kidney injury) ([address]        PAST MEDICAL HISTORY:    Past Medical History:   Diagnosis Date    Anemia     Anxiety     Arthritis     knees    Chronic knee pain     Chronic pain syndrome     CKD (chronic kidney disease) stage 4, GFR 15-29 ml/min ([facility])     Depression     Diabetes mellitus ([facility])     type 2    Dysphagia, oral phase 07/31/2020    Gall stones     Generalized muscle weakness     Hypertension     Hypertensive kidney disease with stage 4 chronic kidney disease ([facility])     Kidney disease     Kidney stones     Obesity     Protein calorie malnutrition ([facility])     Sacral pressure ulcer 07/31/2020    Sarcoidosis     SOBOE (shortness of breath on exertion)     Tremor     Unspecified osteoarthritis, unspecified site     Urinary anomaly leakage,urinate>1/night    UTI (urinary tract infection)        DIET:    DIET RENAL;  Dietary Nutrition Supplements: Wound Healing Oral Supplement     PHYSICAL EXAM:     Patient Vitals for the past 24 hrs:   BP Temp Temp src Pulse Resp SpO2 Height Weight   05/05/21 0745 (!) 164/73 97.6 °F (36.4 °C) Oral 70 16 98 % -- --   05/05/21 0615 -- -- -- -- -- -- -- 200 lb 1.6 oz (90.8 kg)   05/04/21 2228 108/66 98.9 °F (37.2 °C) Oral 74 16 98 % -- --   05/04/21 1419 (!) 116/54 98.6 °F (37 °C) Oral 72 16 -- 5' 1\" (1.549 m) --   @      Intake/Output Summary (Last 24 hours) at 5/5/2021 1144  Last data filed at 5/5/2021 0808  Gross per 24 hour   Intake 1459 ml   Output 1625 ml   Net -166 ml         Wt Readings from Last 3 Encounters:   05/05/21 200 lb 1.6 oz (90.8 kg)   04/28/21 185 lb (83.9 kg)   04/14/21 190 lb (86.2 kg)       Constitutional:  Pt is in no acute distress  Head: normocephalic, atraumatic  Neck: no JVD  Cardiovascular: regular rate and rhythm, no murmurs, gallops, or rubs  Respiratory:  No rales, rhochi, or wheezes  Gastrointestinal:  Soft, nontender, nondistended, bowel sounds x 4  Ext: no edema  Skin: dry, no rash  Neuro: asterixis    MEDS (scheduled):    epoetin derek-epbx  30 Units/kg Subcutaneous Once per day on Mon Wed Fri    sodium hypochlorite   Irrigation BID    white petrolatum   Topical BID    amLODIPine  10 mg Oral Daily    busPIRone  5 mg Oral TID    docusate sodium  100 mg Oral BID    escitalopram  10 mg Oral Daily    ferrous sulfate  325 mg Oral Daily with breakfast    gabapentin  300 mg Oral TID    insulin glargine  10 Units Subcutaneous Nightly    insulin lispro  0-10 Units Subcutaneous TID AC    metoprolol succinate  25 mg Oral Daily    patiromer sorbitex calcium  8.4 g Oral Daily    polyethylene glycol  17 g Oral Daily    senna  2 tablet Oral BID    sevelamer  800 mg Oral TID WC    SITagliptin  25 mg Oral Daily    sodium polystyrene  15 g Oral BID    Vitamin D  2,000 Units Oral Daily    sodium chloride flush  5-40 mL Intravenous 2 times per day    heparin (porcine)  5,000 Units Subcutaneous 3 times per day       MEDS (infusions):   sodium chloride         MEDS (prn):  acetaminophen, magnesium hydroxide, oxyCODONE-acetaminophen, sodium chloride flush, sodium chloride, promethazine **OR** ondansetron    DATA:    Recent Labs     05/03/21  1214 05/04/21  0520   WBC 16.0* 10.8   HGB 9.7* 8.6*   HCT 32.4* 29.6*   .7* 110.9*    267     Recent Labs     05/03/21  1351 05/03/21  1351 05/04/21  0520 05/04/21  1730 05/05/21  0547     --  144 143 144   K 6.4*   < > 6.3* 5.3* 5.8*     --  109* 106 110*   CO2 23  --  24 26 24   BUN 85*  --  84* 77* 73*   CREATININE 6.2*  --  5.9* 5.9* 5.7*   LABGLOM 7  --  7 7 7   GLUCOSE 216*   < > 104* 134* 84   CALCIUM 8.6  --  8.6 8.5* 8.1*   ALT 15  --  11  --   --    AST 19  --  17  --   --    BILITOT 0.3  --  0.2  --   --    ALKPHOS 153*  --  171*  --   --    PHOS  --   --   --   --  6.6*    < > = values in this interval not displayed.        Lab Results   Component Value Date    LABALBU 2.6 (L) 05/04/2021    LABALBU 2.9 (L) 05/03/2021    LABALBU 2.9 (L) 01/10/2021     Lab Results   Component Value Date    TSH 1.945 08/16/2013       Iron Studies  Lab Results   Component Value Date    IRON 65 01/09/2021    TIBC 198 (L) 01/09/2021    FERRITIN 302 01/09/2021     Vitamin B-12   Date Value Ref Range Status   01/09/2021 836 211 - 946 pg/mL Final     Folate   Date Value Ref Range Status   01/09/2021 19.7 4.8 - 24.2 ng/mL Final       Vit D, 25-Hydroxy   Date Value Ref Range Status   01/09/2021 13 (L) 30 - 100 ng/mL Final     Comment:     <20 ng/mL. ........... Rishi Golas Deficient  20-30 ng/mL. ......... Rishi Golas Insufficient   ng/mL. ........ Rishi Golas Sufficient  >100 ng/mL. .......... Rishi Golas Toxic       PTH   Date Value Ref Range Status   05/05/2021 86 (H) 15 - 65 pg/mL Final       No components found for: URIC    Lab Results   Component Value Date    COLORU Yellow 05/03/2021    NITRU POSITIVE 05/03/2021    GLUCOSEU Negative 05/03/2021    GLUCOSEU NEGATIVE 08/17/2011    KETUA Negative 05/03/2021    UROBILINOGEN 0.2 05/03/2021    BILIRUBINUR Negative 05/03/2021    BILIRUBINUR NEGATIVE 08/17/2011       No results found for: Donnis Ray      IMPRESSION/RECOMMENDATIONS:      1. arf on ckd 5  Baseline cr 5.4 from jan 2021  Muscle tremors likely due to uremia  Low dose ivf  p 6.6, pth 86  Start hd thru temp line ,she is uremic and k is high  2 hr hd today with 2 k vath 200 bfr  outpt hd arrangements     2. Hyperkalemia  In setting of ckd 5  Received medical management  Starting hd today  Renal diet     3. Anemia  nl b12 fol fe  In setting of ckd 5  Start lorie     4. Leukocytosis  Starting abx for uti  Pan cx  Taken off abx by felicita Purdy.  Omayra Snyder MD

## 2021-05-05 NOTE — PROGRESS NOTES
[address] [place] [address] [facility]  Progress Note    SUBJECTIVE:  Chief Complaint   Patient presents with    Tremors     generalized since 7am, no hx seizures     Patient is lying in bed. In no distress  No fevers or chills. No N/V/D. Wants nicole out. Review of systems:  As stated above in the chief complaint, otherwise negative. Medications:  Scheduled Meds:   epoetin derek-epbx  30 Units/kg Subcutaneous Once per day on     sodium hypochlorite   Irrigation BID    white petrolatum   Topical BID    amLODIPine  10 mg Oral Daily    busPIRone  5 mg Oral TID    docusate sodium  100 mg Oral BID    escitalopram  10 mg Oral Daily    ferrous sulfate  325 mg Oral Daily with breakfast    gabapentin  300 mg Oral TID    insulin glargine  10 Units Subcutaneous Nightly    insulin lispro  0-10 Units Subcutaneous TID AC    metoprolol succinate  25 mg Oral Daily    patiromer sorbitex calcium  8.4 g Oral Daily    polyethylene glycol  17 g Oral Daily    senna  2 tablet Oral BID    sevelamer  800 mg Oral TID WC    SITagliptin  25 mg Oral Daily    sodium polystyrene  15 g Oral BID    Vitamin D  2,000 Units Oral Daily    sodium chloride flush  5-40 mL Intravenous 2 times per day    heparin (porcine)  5,000 Units Subcutaneous 3 times per day     Continuous Infusions:   sodium chloride       PRN Meds:acetaminophen, magnesium hydroxide, oxyCODONE-acetaminophen, sodium chloride flush, sodium chloride, promethazine **OR** ondansetron    OBJECTIVE:  BP (!) 164/73   Pulse 70   Temp 97.6 °F (36.4 °C) (Oral)   Resp 16   Ht 5' 1\" (1.549 m)   Wt 200 lb 1.6 oz (90.8 kg)   SpO2 98%   BMI 37.81 kg/m²   Temp  Av.4 °F (36.9 °C)  Min: 97.6 °F (36.4 °C)  Max: 98.9 °F (37.2 °C)  Constitutional: The patient is awake, alert, and oriented. No distress. asking for nicole to be removed   Skin: Warm and dry. No rashes were noted. HEENT: Round and reactive pupils. Moist mucous membranes.   No ulcerations or thrush. Neck: Supple to movements. Chest: No respiratory distress. Symmetrical expansion. Clear bilaterally. Cardiovascular: S1 and S2 are rhythmic and regular. No murmurs appreciated. Abdomen: Positive bowel sounds to auscultation. Benign to palpation. No masses felt. No hepatosplenomegaly. Extremities: No clubbing, no cyanosis, no edema.   Lines: peripheral  Castelan catheter - clear yellow  HD cath right groin 5/4/2021     Laboratory and Tests Review:  Lab Results   Component Value Date    WBC 10.8 05/04/2021    WBC 16.0 (H) 05/03/2021    WBC 5.6 01/10/2021    HGB 8.6 (L) 05/04/2021    HCT 29.6 (L) 05/04/2021    .9 (H) 05/04/2021     05/04/2021     Lab Results   Component Value Date    NEUTROABS 9.09 (H) 05/04/2021    NEUTROABS 14.23 (H) 05/03/2021    NEUTROABS 5.32 01/07/2021     No results found for: [facility]  Lab Results   Component Value Date    ALT 11 05/04/2021    AST 17 05/04/2021    ALKPHOS 171 (H) 05/04/2021    BILITOT 0.2 05/04/2021     Lab Results   Component Value Date     05/05/2021    K 5.8 05/05/2021    K 6.3 05/04/2021     05/05/2021    CO2 24 05/05/2021    BUN 73 05/05/2021    CREATININE 5.7 05/05/2021    CREATININE 5.9 05/04/2021    CREATININE 5.9 05/04/2021    GFRAA 9 05/05/2021    LABGLOM 7 05/05/2021    GLUCOSE 84 05/05/2021    GLUCOSE 149 10/12/2011    PROT 6.6 05/04/2021    LABALBU 2.6 05/04/2021    LABALBU 4.1 10/12/2011    CALCIUM 8.1 05/05/2021    BILITOT 0.2 05/04/2021    ALKPHOS 171 05/04/2021    AST 17 05/04/2021    ALT 11 05/04/2021     Lab Results   Component Value Date    CRP 19.3 (H) 05/24/2020    CRP 15.8 (H) 05/21/2020    CRP 0.7 (H) 08/13/2019     Lab Results   Component Value Date    SEDRATE 45 (H) 08/13/2019    SEDRATE 35 (H) 08/16/2013    SEDRATE 78 (H) 08/17/2011     Radiology:  noted    Microbiology:   Blood cultures 5/3/2021 - negative so far   Urine culture 5/3/2021 - E.coli, GPC    ASSESSMENT:  · Asymptomatic bacteriuria  · Acute on chronic kidney disease, HD line place 5/4/2021 to start HD today    · Leukocytosis associated to possible dystonia, resolved    PLAN:  · Continue off antibiotics at this time   · Nephrology following - to start HD today     Syed Lela  11:00 AM  5/5/2021     Patient seen and examined. I had a face to face encounter with the patient. Agree with exam.  Assessment and plan as outlined above and directed by me. Addition and corrections were done as deemed appropriate. My exam and plan include: The patient is doing well. She will be starting hemodialysis. She is okay for a tunneled HD catheter from ID standpoint. She warrants no antibiotics at this time. ID will sign off. Call if needed.     Tatyana Huber  5/5/2021  1:52 PM

## 2021-05-05 NOTE — PROGRESS NOTES
Subjective: The patient is awake and alert. No problems overnight. Denies chest pain, angina, and dyspnea. Denies abdominal pain. Tolerating diet. No nausea or vomiting. probable hemodialysis today. Objective:    BP (!) 146/70   Pulse 65   Temp 97.5 °F (36.4 °C) (Axillary)   Resp 16   Ht 5' 1\" (1.549 m)   Wt 198 lb 10.2 oz (90.1 kg)   SpO2 99%   BMI 37.53 kg/m²     Heart:  RRR, no murmurs, gallops, or rubs.   Lungs:  CTA bilaterally, no wheeze, rales or rhonchi  Abd: bowel sounds present, nontender, nondistended, no masses  Extrem:  No clubbing, cyanosis, or edema    CBC with Differential:    Lab Results   Component Value Date    WBC 10.8 05/04/2021    RBC 2.67 05/04/2021    HGB 8.6 05/04/2021    HCT 29.6 05/04/2021     05/04/2021    .9 05/04/2021    MCH 32.2 05/04/2021    MCHC 29.1 05/04/2021    RDW 16.3 05/04/2021    NRBC 0.9 05/25/2020    SEGSPCT 71 08/16/2013    BANDSPCT 1 03/29/2016    METASPCT 0.9 05/10/2020    LYMPHOPCT 8.4 05/04/2021    PROMYELOPCT 0.9 05/09/2020    MONOPCT 5.8 05/04/2021    MYELOPCT 1.7 05/11/2020    BASOPCT 0.4 05/04/2021    MONOSABS 0.62 05/04/2021    LYMPHSABS 0.91 05/04/2021    EOSABS 0.05 05/04/2021    BASOSABS 0.04 05/04/2021     CMP:    Lab Results   Component Value Date     05/05/2021    K 5.8 05/05/2021    K 6.3 05/04/2021     05/05/2021    CO2 24 05/05/2021    BUN 73 05/05/2021    CREATININE 5.7 05/05/2021    GFRAA 9 05/05/2021    LABGLOM 7 05/05/2021    GLUCOSE 84 05/05/2021    GLUCOSE 149 10/12/2011    PROT 6.6 05/04/2021    LABALBU 2.6 05/04/2021    LABALBU 4.1 10/12/2011    CALCIUM 8.1 05/05/2021    BILITOT 0.2 05/04/2021    ALKPHOS 171 05/04/2021    AST 17 05/04/2021    ALT 11 05/04/2021        Assessment:    Patient Active Problem List   Diagnosis    Neurologic gait dysfunction    [facility] failure[facility] acute on chronic [facility]    Diabetes mellitus ([facility]    Hypertension    Arthritis    Kidney disease    Acute blood loss anemia    Knee

## 2021-05-05 NOTE — FLOWSHEET NOTE
05/05/21 1647   Vital Signs   BP (!) 161/69   Temp 97.1 °F (36.2 °C)   Pulse 68   Resp 16   Weight 198 lb 10.2 oz (90.1 kg)   Weight Method Estimated   Percent Weight Change -0.73   Pain Assessment   Pain Assessment 0-10   Pain Level 0   Post-Hemodialysis Assessment   Post-Treatment Procedures Blood returned;Catheter capped, clamped with Citrate x 2 ports   Machine Disinfection Process Exterior Machine Disinfection   Rinseback Volume (ml) 300 ml   Dialyzer Clearance Clear   Duration of Treatment (minutes) 120 minutes   Hemodialysis Output (ml) 700 ml   NET Removed (ml) 400 ml   Tolerated Treatment Good   Patient Response to Treatment tolerated well   Bilateral Breath Sounds Clear

## 2021-05-05 NOTE — CARE COORDINATION
Social work / Discharge Planning:       Social work consult noted regarding \"outpatient hd needs to be set up at [facility]"       Referral made to [facility]. He will need to be updated when discharge is anticipated to finalized OP HD chair time. Liaison from [facility] updated on new dialysis.     Electronically signed by SHANTA Chapman on 5/5/2021 at 12:14 PM

## 2021-05-06 ENCOUNTER — APPOINTMENT (OUTPATIENT)
Dept: GENERAL RADIOLOGY | Age: 71
DRG: 674 | End: 2021-05-06
Payer: MEDICARE

## 2021-05-06 ENCOUNTER — ANESTHESIA EVENT (OUTPATIENT)
Dept: OPERATING ROOM | Age: 71
DRG: 674 | End: 2021-05-06
Payer: MEDICARE

## 2021-05-06 ENCOUNTER — ANESTHESIA (OUTPATIENT)
Dept: OPERATING ROOM | Age: 71
DRG: 674 | End: 2021-05-06
Payer: MEDICARE

## 2021-05-06 VITALS — OXYGEN SATURATION: 97 % | DIASTOLIC BLOOD PRESSURE: 48 MMHG | SYSTOLIC BLOOD PRESSURE: 99 MMHG

## 2021-05-06 PROBLEM — N18.9 ACUTE KIDNEY INJURY SUPERIMPOSED ON CKD (HCC): Status: ACTIVE | Noted: 2020-01-31

## 2021-05-06 LAB
ANION GAP SERPL CALCULATED.3IONS-SCNC: 9 MMOL/L (ref 7–16)
BUN BLDV-MCNC: 45 MG/DL (ref 6–23)
CALCIUM SERPL-MCNC: 8.3 MG/DL (ref 8.6–10.2)
CHLORIDE BLD-SCNC: 108 MMOL/L (ref 98–107)
CO2: 27 MMOL/L (ref 22–29)
CREAT SERPL-MCNC: 4.2 MG/DL (ref 0.5–1)
GFR AFRICAN AMERICAN: 13
GFR NON-AFRICAN AMERICAN: 10 ML/MIN/1.73
GLUCOSE BLD-MCNC: 96 MG/DL (ref 74–99)
METER GLUCOSE: 101 MG/DL (ref 74–99)
METER GLUCOSE: 121 MG/DL (ref 74–99)
POTASSIUM SERPL-SCNC: 4.6 MMOL/L (ref 3.5–5)
SODIUM BLD-SCNC: 144 MMOL/L (ref 132–146)

## 2021-05-06 PROCEDURE — 36415 COLL VENOUS BLD VENIPUNCTURE: CPT

## 2021-05-06 PROCEDURE — 2580000003 HC RX 258: Performed by: NURSE ANESTHETIST, CERTIFIED REGISTERED

## 2021-05-06 PROCEDURE — 3700000001 HC ADD 15 MINUTES (ANESTHESIA): Performed by: SURGERY

## 2021-05-06 PROCEDURE — C1881 DIALYSIS ACCESS SYSTEM: HCPCS | Performed by: SURGERY

## 2021-05-06 PROCEDURE — 7100000011 HC PHASE II RECOVERY - ADDTL 15 MIN: Performed by: SURGERY

## 2021-05-06 PROCEDURE — 7100000010 HC PHASE II RECOVERY - FIRST 15 MIN: Performed by: SURGERY

## 2021-05-06 PROCEDURE — 77001 FLUOROGUIDE FOR VEIN DEVICE: CPT | Performed by: SURGERY

## 2021-05-06 PROCEDURE — 3700000000 HC ANESTHESIA ATTENDED CARE: Performed by: SURGERY

## 2021-05-06 PROCEDURE — 6360000002 HC RX W HCPCS: Performed by: FAMILY MEDICINE

## 2021-05-06 PROCEDURE — 71045 X-RAY EXAM CHEST 1 VIEW: CPT

## 2021-05-06 PROCEDURE — 36558 INSERT TUNNELED CV CATH: CPT | Performed by: SURGERY

## 2021-05-06 PROCEDURE — 3600000013 HC SURGERY LEVEL 3 ADDTL 15MIN: Performed by: SURGERY

## 2021-05-06 PROCEDURE — 0JH60WZ INSERTION OF TOTALLY IMPLANTABLE VASCULAR ACCESS DEVICE INTO CHEST SUBCUTANEOUS TISSUE AND FASCIA, OPEN APPROACH: ICD-10-PCS | Performed by: SURGERY

## 2021-05-06 PROCEDURE — 6360000002 HC RX W HCPCS: Performed by: SURGERY

## 2021-05-06 PROCEDURE — 6370000000 HC RX 637 (ALT 250 FOR IP): Performed by: FAMILY MEDICINE

## 2021-05-06 PROCEDURE — 2500000003 HC RX 250 WO HCPCS: Performed by: SURGERY

## 2021-05-06 PROCEDURE — 2060000000 HC ICU INTERMEDIATE R&B

## 2021-05-06 PROCEDURE — 80048 BASIC METABOLIC PNL TOTAL CA: CPT

## 2021-05-06 PROCEDURE — 3209999900 FLUORO FOR SURGICAL PROCEDURES

## 2021-05-06 PROCEDURE — 82962 GLUCOSE BLOOD TEST: CPT

## 2021-05-06 PROCEDURE — 3600000003 HC SURGERY LEVEL 3 BASE: Performed by: SURGERY

## 2021-05-06 PROCEDURE — 02HV33Z INSERTION OF INFUSION DEVICE INTO SUPERIOR VENA CAVA, PERCUTANEOUS APPROACH: ICD-10-PCS | Performed by: SURGERY

## 2021-05-06 PROCEDURE — 2709999900 HC NON-CHARGEABLE SUPPLY: Performed by: SURGERY

## 2021-05-06 PROCEDURE — 6360000002 HC RX W HCPCS: Performed by: NURSE ANESTHETIST, CERTIFIED REGISTERED

## 2021-05-06 PROCEDURE — 2580000003 HC RX 258: Performed by: FAMILY MEDICINE

## 2021-05-06 DEVICE — 15.5F X 28CM PRE-CURVED15.5F X 2 TITAN HD CATHETERTITAN HD CATHET FULL SET W/SIDEHOLESFULL SET W/S (CUFF 23CM FROM TIP)(CUFF 23CM F
Type: IMPLANTABLE DEVICE | Site: CHEST | Status: FUNCTIONAL
Brand: MEDCOMP HEMO-FLOW DOUBLE LUMEN CATHETERMEDCOMP HEMO-FLOW DOUBLE LUMEN CATHETER

## 2021-05-06 RX ORDER — HEPARIN SODIUM 1000 [USP'U]/ML
INJECTION, SOLUTION INTRAVENOUS; SUBCUTANEOUS PRN
Status: DISCONTINUED | OUTPATIENT
Start: 2021-05-06 | End: 2021-05-06 | Stop reason: HOSPADM

## 2021-05-06 RX ORDER — SODIUM CHLORIDE 9 MG/ML
INJECTION, SOLUTION INTRAVENOUS CONTINUOUS PRN
Status: DISCONTINUED | OUTPATIENT
Start: 2021-05-06 | End: 2021-05-06 | Stop reason: SDUPTHER

## 2021-05-06 RX ORDER — HEPARIN SODIUM (PORCINE) LOCK FLUSH IV SOLN 100 UNIT/ML 100 UNIT/ML
SOLUTION INTRAVENOUS PRN
Status: DISCONTINUED | OUTPATIENT
Start: 2021-05-06 | End: 2021-05-06 | Stop reason: HOSPADM

## 2021-05-06 RX ORDER — ONDANSETRON 2 MG/ML
4 INJECTION INTRAMUSCULAR; INTRAVENOUS
Status: DISCONTINUED | OUTPATIENT
Start: 2021-05-06 | End: 2021-05-06 | Stop reason: HOSPADM

## 2021-05-06 RX ORDER — FENTANYL CITRATE 50 UG/ML
INJECTION, SOLUTION INTRAMUSCULAR; INTRAVENOUS PRN
Status: DISCONTINUED | OUTPATIENT
Start: 2021-05-06 | End: 2021-05-06 | Stop reason: SDUPTHER

## 2021-05-06 RX ORDER — PROPOFOL 10 MG/ML
INJECTION, EMULSION INTRAVENOUS CONTINUOUS PRN
Status: DISCONTINUED | OUTPATIENT
Start: 2021-05-06 | End: 2021-05-06 | Stop reason: SDUPTHER

## 2021-05-06 RX ORDER — CEFAZOLIN SODIUM 1 G/3ML
INJECTION, POWDER, FOR SOLUTION INTRAMUSCULAR; INTRAVENOUS PRN
Status: DISCONTINUED | OUTPATIENT
Start: 2021-05-06 | End: 2021-05-06 | Stop reason: SDUPTHER

## 2021-05-06 RX ORDER — MIDAZOLAM HYDROCHLORIDE 1 MG/ML
INJECTION INTRAMUSCULAR; INTRAVENOUS PRN
Status: DISCONTINUED | OUTPATIENT
Start: 2021-05-06 | End: 2021-05-06 | Stop reason: SDUPTHER

## 2021-05-06 RX ADMIN — ESCITALOPRAM OXALATE 10 MG: 10 TABLET ORAL at 08:19

## 2021-05-06 RX ADMIN — GABAPENTIN 300 MG: 300 CAPSULE ORAL at 08:18

## 2021-05-06 RX ADMIN — SODIUM CHLORIDE, PRESERVATIVE FREE 10 ML: 5 INJECTION INTRAVENOUS at 08:19

## 2021-05-06 RX ADMIN — MIDAZOLAM 1 MG: 1 INJECTION INTRAMUSCULAR; INTRAVENOUS at 17:16

## 2021-05-06 RX ADMIN — SODIUM CHLORIDE, PRESERVATIVE FREE 10 ML: 5 INJECTION INTRAVENOUS at 20:20

## 2021-05-06 RX ADMIN — OXYCODONE HYDROCHLORIDE AND ACETAMINOPHEN 1 TABLET: 5; 325 TABLET ORAL at 20:19

## 2021-05-06 RX ADMIN — GABAPENTIN 300 MG: 300 CAPSULE ORAL at 20:19

## 2021-05-06 RX ADMIN — SODIUM HYPOCHLORITE: 1.25 SOLUTION TOPICAL at 20:20

## 2021-05-06 RX ADMIN — FERROUS SULFATE TAB 325 MG (65 MG ELEMENTAL FE) 325 MG: 325 (65 FE) TAB at 08:19

## 2021-05-06 RX ADMIN — AMLODIPINE BESYLATE 10 MG: 10 TABLET ORAL at 08:18

## 2021-05-06 RX ADMIN — MAGNESIUM HYDROXIDE 30 ML: 400 SUSPENSION ORAL at 01:41

## 2021-05-06 RX ADMIN — SEVELAMER CARBONATE 800 MG: 800 TABLET, FILM COATED ORAL at 08:19

## 2021-05-06 RX ADMIN — SODIUM POLYSTYRENE SULFONATE 15 G: 15 SUSPENSION ORAL; RECTAL at 20:30

## 2021-05-06 RX ADMIN — METOPROLOL SUCCINATE 25 MG: 25 TABLET, EXTENDED RELEASE ORAL at 08:19

## 2021-05-06 RX ADMIN — PROPOFOL 50 MCG/KG/MIN: 10 INJECTION, EMULSION INTRAVENOUS at 17:23

## 2021-05-06 RX ADMIN — PETROLATUM: 420 OINTMENT TOPICAL at 20:21

## 2021-05-06 RX ADMIN — BUSPIRONE HYDROCHLORIDE 5 MG: 5 TABLET ORAL at 20:19

## 2021-05-06 RX ADMIN — HEPARIN SODIUM 5000 UNITS: 10000 INJECTION, SOLUTION INTRAVENOUS; SUBCUTANEOUS at 05:47

## 2021-05-06 RX ADMIN — PHENYLEPHRINE HYDROCHLORIDE 50 MCG: 10 INJECTION INTRAVENOUS at 17:41

## 2021-05-06 RX ADMIN — CEFAZOLIN 3000 MG: 1 INJECTION, POWDER, FOR SOLUTION INTRAMUSCULAR; INTRAVENOUS at 17:18

## 2021-05-06 RX ADMIN — MIDAZOLAM 1 MG: 1 INJECTION INTRAMUSCULAR; INTRAVENOUS at 17:18

## 2021-05-06 RX ADMIN — SODIUM HYPOCHLORITE: 1.25 SOLUTION TOPICAL at 08:19

## 2021-05-06 RX ADMIN — PHENYLEPHRINE HYDROCHLORIDE 50 MCG: 10 INJECTION INTRAVENOUS at 17:37

## 2021-05-06 RX ADMIN — STANDARDIZED SENNA CONCENTRATE 17.2 MG: 8.6 TABLET ORAL at 20:19

## 2021-05-06 RX ADMIN — PETROLATUM: 420 OINTMENT TOPICAL at 08:19

## 2021-05-06 RX ADMIN — SODIUM CHLORIDE: 9 INJECTION, SOLUTION INTRAVENOUS at 17:11

## 2021-05-06 RX ADMIN — STANDARDIZED SENNA CONCENTRATE 17.2 MG: 8.6 TABLET ORAL at 08:18

## 2021-05-06 RX ADMIN — HEPARIN SODIUM 5000 UNITS: 10000 INJECTION, SOLUTION INTRAVENOUS; SUBCUTANEOUS at 20:51

## 2021-05-06 RX ADMIN — SODIUM POLYSTYRENE SULFONATE 15 G: 15 SUSPENSION ORAL; RECTAL at 08:19

## 2021-05-06 RX ADMIN — DOCUSATE SODIUM 100 MG: 100 CAPSULE, LIQUID FILLED ORAL at 08:18

## 2021-05-06 RX ADMIN — BUSPIRONE HYDROCHLORIDE 5 MG: 5 TABLET ORAL at 08:19

## 2021-05-06 RX ADMIN — DOCUSATE SODIUM 100 MG: 100 CAPSULE, LIQUID FILLED ORAL at 20:19

## 2021-05-06 RX ADMIN — POLYETHYLENE GLYCOL 3350 17 G: 17 POWDER, FOR SOLUTION ORAL at 08:18

## 2021-05-06 RX ADMIN — PATIROMER 8.4 G: 8.4 POWDER, FOR SUSPENSION ORAL at 08:18

## 2021-05-06 RX ADMIN — Medication 2000 UNITS: at 08:18

## 2021-05-06 RX ADMIN — FENTANYL CITRATE 50 MCG: 50 INJECTION, SOLUTION INTRAMUSCULAR; INTRAVENOUS at 17:23

## 2021-05-06 RX ADMIN — FENTANYL CITRATE 50 MCG: 50 INJECTION, SOLUTION INTRAMUSCULAR; INTRAVENOUS at 17:28

## 2021-05-06 RX ADMIN — OXYCODONE HYDROCHLORIDE AND ACETAMINOPHEN 1 TABLET: 5; 325 TABLET ORAL at 09:26

## 2021-05-06 ASSESSMENT — PULMONARY FUNCTION TESTS
PIF_VALUE: 0
PIF_VALUE: 1
PIF_VALUE: 1
PIF_VALUE: 0
PIF_VALUE: 1
PIF_VALUE: 0
PIF_VALUE: 1
PIF_VALUE: 0
PIF_VALUE: 1
PIF_VALUE: 0

## 2021-05-06 ASSESSMENT — PAIN SCALES - GENERAL
PAINLEVEL_OUTOF10: 0
PAINLEVEL_OUTOF10: 8
PAINLEVEL_OUTOF10: 0

## 2021-05-06 ASSESSMENT — PAIN DESCRIPTION - PROGRESSION: CLINICAL_PROGRESSION: GRADUALLY WORSENING

## 2021-05-06 ASSESSMENT — PAIN DESCRIPTION - PAIN TYPE: TYPE: CHRONIC PAIN

## 2021-05-06 ASSESSMENT — PAIN DESCRIPTION - FREQUENCY: FREQUENCY: CONTINUOUS

## 2021-05-06 ASSESSMENT — ENCOUNTER SYMPTOMS: SHORTNESS OF BREATH: 0

## 2021-05-06 ASSESSMENT — PAIN DESCRIPTION - DESCRIPTORS: DESCRIPTORS: CONSTANT;DISCOMFORT

## 2021-05-06 ASSESSMENT — PAIN DESCRIPTION - ONSET: ONSET: ON-GOING

## 2021-05-06 ASSESSMENT — PAIN DESCRIPTION - LOCATION: LOCATION: COCCYX

## 2021-05-06 NOTE — PROGRESS NOTES
Patient expressing many concerns this morning about dialysis and returning to rehab. Patient is unhappy with [facility] and the lack of therapy she felt she was getting. MSW made aware.     Electronically signed by Merlinda Kennedy, RN on 5/6/2021 at 10:32 AM

## 2021-05-06 NOTE — PROGRESS NOTES
Risk benefits and alternatives were discussed with the patient including but not limited to bleeding, infection, arteriovenous nerve injury, myocardial infarction, respiratory failure, pneumothorax, great vessel injury, DVT, failure of future access, anesthetic reaction, catheter associated infection, and/or death the patient understands and wishes to proceed all questions were answered according to their satisfaction.

## 2021-05-06 NOTE — PROGRESS NOTES
[facility]  Nephrology Attending Progress Note  Radha Burrows. Tripp Mcintosh MD        SUBJECTIVE:     5/4:  The pt is a 70 yo female with a pmh of htn, dm, nephrolithiasis, djd, diabetic foot ulcer sarcoidosis who came in from a nursing home for tremors and muscle twitching. She has a pmh of ckd 5 with a baseline cr of 5.4 from12/2021 with gfr of 9. Her labs on admission showed na 141 k 6.4, co2 23, bun 85, cr 6.2, alb 2.6, wbc 16, hgb 9.7, plt 314. She was started on rocephin for uti. She was given insulin, glucose, calcium, kayexalate and hco3 for hyperkalemia. She was started on ns at 75 ml/hr. She was seen by us in the hospital in jan 2021 at which time she was seen for ms changes and hyperkaemia. She was supposed to have an avf/avg placed by dr Devi Wells. She saw him a few days ago and it wasn't done yet due to fungal infection in the axilla she tells me. She denies nausea, vomiting, sob, cp, loss of appetite, fatigue. She has asterixis and muscle twitching due to uremia.      5/5: pt seen in room, starting hd today, no cp or sob    5/6: pt seen in room, sp first hd yesterday    PROBLEM LIST:    Patient Active Problem List   Diagnosis    Neurologic gait dysfunction    [address]al failure, acute on chronic ([address]    Diabetes mellitus ([address]    Hypertension    Arthritis    Kidney disease    Acute blood loss anemia    Knee problem    Anemia due to stage 3 chronic kidney disease    Primary osteoarthritis of both knees    Acute on chronic renal insufficiency    Acute renal failure (HCC)    Metabolic acidosis    Acute renal failure superimposed on chronic kidney disease, on chronic dialysis ([address]    Sepsis ([address]    2019 novel coronavirus disease (COVID-19)    GI bleed    Moderate protein-calorie malnutrition (HCC)    Hyperkalemia    PERLA (acute kidney injury) ([address]        PAST MEDICAL HISTORY:    Past Medical History:   Diagnosis Date    Anemia     Anxiety     Arthritis     knees    Chronic knee pain     Chronic pain syndrome     CKD (chronic kidney disease) stage 4, GFR 15-29 ml/min [facility]     Depression     Diabetes mellitus ([facility])     type 2    Dysphagia, oral phase 07/31/2020    Gall stones     Generalized muscle weakness     Hypertension     Hypertensive kidney disease with stage 4 chronic kidney disease ([facility])     Kidney disease     Kidney stones     Obesity     Protein calorie malnutrition ([facility])     Sacral pressure ulcer 07/31/2020    Sarcoidosis     SOBOE (shortness of breath on exertion)     Tremor     Unspecified osteoarthritis, unspecified site     Urinary anomaly leakage,urinate>1/night    UTI (urinary tract infection)        DIET:    Diet NPO Effective Now     PHYSICAL EXAM:     Patient Vitals for the past 24 hrs:   BP Temp Temp src Pulse Resp SpO2 Weight   05/06/21 1203 (!) 162/64 -- -- 81 -- -- --   05/06/21 0915 (!) 200/61 -- -- 86 -- -- --   05/06/21 0815 (!) 197/76 98.2 °F (36.8 °C) Oral 67 16 100 % --   05/06/21 0239 -- -- -- -- -- -- 209 lb 3.5 oz (94.9 kg)   05/05/21 2318 129/60 97.6 °F (36.4 °C) Oral 69 16 98 % --   05/05/21 1732 (!) 146/70 97.5 °F (36.4 °C) Axillary 65 16 99 % --   05/05/21 1647 (!) 161/69 97.1 °F (36.2 °C) -- 68 16 -- 198 lb 10.2 oz (90.1 kg)   05/05/21 1601 (!) 148/62 -- -- 62 -- -- --   05/05/21 1531 (!) 142/57 -- -- 61 -- -- --   05/05/21 1500 (!) 150/76 -- -- 72 -- -- --   05/05/21 1446 (!) 154/57 -- -- 67 -- -- --   05/05/21 1440 (!) 166/72 97.6 °F (36.4 °C) -- 76 -- -- --   @      Intake/Output Summary (Last 24 hours) at 5/6/2021 1308  Last data filed at 5/6/2021 0831  Gross per 24 hour   Intake --   Output 2925 ml   Net -2925 ml         Wt Readings from Last 3 Encounters:   05/06/21 209 lb 3.5 oz (94.9 kg)   04/28/21 185 lb (83.9 kg)   04/14/21 190 lb (86.2 kg)       Constitutional:  Pt is in no acute distress  Head: normocephalic, atraumatic  Neck: no JVD  Cardiovascular: regular rate and rhythm, no murmurs, gallops, or rubs  Respiratory:  No rales, rhochi, or wheezes  Gastrointestinal:  Soft, nontender, nondistended, bowel sounds x 4  Ext: no edema  Skin: dry, no rash  Neuro: asterixis    MEDS (scheduled):    epoetin derek-epbx  30 Units/kg Subcutaneous Once per day on Mon Wed Fri    sodium hypochlorite   Irrigation BID    white petrolatum   Topical BID    amLODIPine  10 mg Oral Daily    busPIRone  5 mg Oral TID    docusate sodium  100 mg Oral BID    escitalopram  10 mg Oral Daily    ferrous sulfate  325 mg Oral Daily with breakfast    gabapentin  300 mg Oral TID    insulin glargine  10 Units Subcutaneous Nightly    insulin lispro  0-10 Units Subcutaneous TID AC    metoprolol succinate  25 mg Oral Daily    patiromer sorbitex calcium  8.4 g Oral Daily    polyethylene glycol  17 g Oral Daily    senna  2 tablet Oral BID    sevelamer  800 mg Oral TID WC    SITagliptin  25 mg Oral Daily    sodium polystyrene  15 g Oral BID    Vitamin D  2,000 Units Oral Daily    sodium chloride flush  5-40 mL Intravenous 2 times per day    heparin (porcine)  5,000 Units Subcutaneous 3 times per day       MEDS (infusions):   sodium chloride         MEDS (prn):  acetaminophen, magnesium hydroxide, oxyCODONE-acetaminophen, sodium chloride flush, sodium chloride, promethazine **OR** ondansetron    DATA:    Recent Labs     05/04/21  0520   WBC 10.8   HGB 8.6*   HCT 29.6*   .9*        Recent Labs     05/03/21  1351 05/03/21  1351 05/04/21  0520 05/04/21  1730 05/05/21  0547 05/06/21  0550     --  144 143 144 144   K 6.4*   < > 6.3* 5.3* 5.8* 4.6     --  109* 106 110* 108*   CO2 23  --  24 26 24 27   BUN 85*  --  84* 77* 73* 45*   CREATININE 6.2*  --  5.9* 5.9* 5.7* 4.2*   LABGLOM 7  --  7 7 7 10   GLUCOSE 216*   < > 104* 134* 84 96   CALCIUM 8.6  --  8.6 8.5* 8.1* 8.3*   ALT 15  --  11  --   --   --    AST 19  --  17  --   --   --    BILITOT 0.3  --  0.2  --   --   --    ALKPHOS 153*  --  171*  --   --   --    PHOS  --   --   --   --  6.6*

## 2021-05-06 NOTE — PROGRESS NOTES
Occupational Therapy  Patient treatment attempted this PM.  Patient off unit and will attempt at a later time.                                                  Keon SOARES/L [id number]

## 2021-05-06 NOTE — ANESTHESIA PRE PROCEDURE
Department of Anesthesiology  Preprocedure Note       Name:  Rick Lebron   Age:  69 y.o.  :  1950                                          MRN:  [id number]         Date:  2021      Surgeon: Yadira Loza):  Rhea Quinteros MD    Procedure: Procedure(s):  INSERTION TUNNELED DIALYSIS CATHETER  ++REQ 1430++    Medications prior to admission:   Prior to Admission medications    Medication Sig Start Date End Date Taking?  Authorizing Provider   epoetin derek (EPOGEN;PROCRIT) 95435 UNIT/ML injection Inject 80,000 Units into the skin three times a week Indications: HOLD FOR HGB>11 *MON-WED-FRI*   Yes Historical Provider, MD   patiromer sorbitex calcium (VELTASSA) 8.4 g PACK packet Take 8.4 g by mouth daily   Yes Historical Provider, MD   docusate sodium (COLACE) 100 MG capsule Take 100 mg by mouth 2 times daily   Yes Historical Provider, MD   sodium polystyrene (KAYEXALATE) 15 GM/60ML suspension Take 15 g by mouth 2 times daily   Yes Historical Provider, MD   nystatin (NYSTATIN) 854355 UNIT/GM powder Apply topically 3 times daily Indications: BILATERAL AXILLA   Yes Historical Provider, MD   glucagon 1 MG injection Inject 1 kit into the muscle as needed (HYPOGLYCEMIA)   Yes Historical Provider, MD   furosemide (LASIX) 40 MG tablet Take 40 mg by mouth daily   Yes Historical Provider, MD   insulin lispro (HUMALOG) 100 UNIT/ML injection vial Inject 10 Units into the skin 3 times daily (before meals)   Yes Historical Provider, MD   sevelamer (RENVELA) 800 MG tablet Take 1 tablet by mouth 3 times daily (with meals) 20  Yes Kerri Souza MD   Vitamin D (CHOLECALCIFEROL) 50 MCG (2000) TABS tablet Take 1 tablet by mouth daily 20  Yes Kerri Souza MD   Nutritional Supplements (BLESSING PO) Take 1 packet by mouth 2 times daily    Yes Historical Provider, MD   Nutritional Supplements (GLUCERNA PO) Take 1 Units by mouth 3 times daily    Yes Historical Provider, MD   SITagliptin (JANUVIA) 25 MG tablet Take 25 mg by mouth daily   Yes Historical Provider, MD   metoprolol succinate (TOPROL XL) 25 MG extended release tablet Take 1 tablet by mouth daily 10/12/20  Yes Kate Stroud MD   ferrous sulfate (IRON 325) 325 (65 Fe) MG tablet Take 1 tablet by mouth daily (with breakfast) 10/12/20  Yes Kate Stroud MD   escitalopram (LEXAPRO) 5 MG tablet Take 10 mg by mouth daily   Yes Historical Provider, MD   gabapentin (NEURONTIN) 300 MG capsule Take 300 mg by mouth 3 times daily. Yes Historical Provider, MD   insulin lispro (HUMALOG) 100 UNIT/ML injection vial Inject 0-10 Units into the skin 3 times daily (before meals) *PER SLIDING SCALE*    - 199  = give 2 units   - 249  = give 4 units   - 299  = give 6 units   - 349  = give 8 units   - 400 =  give 10 units  BS < 70 or > 400 call physician   Yes Historical Provider, MD   amLODIPine (NORVASC) 10 MG tablet Take 10 mg by mouth daily   Yes Historical Provider, MD   insulin glargine (BASAGLAR KWIKPEN) 100 UNIT/ML injection pen Inject 10 Units into the skin nightly   Yes Historical Provider, MD   busPIRone (BUSPAR) 5 MG tablet Take 5 mg by mouth 3 times daily    Yes Historical Provider, MD   oxyCODONE-acetaminophen (PERCOCET) 5-325 MG per tablet Take 1 tablet by mouth every 8 hours as needed for Pain.    Yes Historical Provider, MD   polyethylene glycol (GLYCOLAX) 17 g packet Take 17 g by mouth daily   Yes Historical Provider, MD   senna (SENOKOT) 8.6 MG tablet Take 2 tablets by mouth 2 times daily    Yes Historical Provider, MD   magnesium hydroxide (MILK OF MAGNESIA) 400 MG/5ML suspension Take 30 mLs by mouth daily as needed for Constipation     Historical Provider, MD   acetaminophen (TYLENOL) 325 MG tablet Take 650 mg by mouth every 6 hours as needed for Pain or Fever     Historical Provider, MD       Current medications:    Current Facility-Administered Medications   Medication Dose Route Frequency Provider Last Rate Last Admin    epoetin derek-epbx (RETACRIT) injection 2,700 Units  30 Units/kg Subcutaneous Once per day on Mon Wed Fri Chloe Gaspar MD   2,700 Units at 05/05/21 3730    sodium hypochlorite (DAKINS) 0.125 % external solution   Irrigation BID Daniela Saxena MD   Given at 05/06/21 4396    white petrolatum ointment   Topical BID Daniela Saxena MD   Given at 05/06/21 2032    acetaminophen (TYLENOL) tablet 650 mg  650 mg Oral Q6H PRN Daniela Saxena MD        amLODIPine (NORVASC) tablet 10 mg  10 mg Oral Daily Daniela Saxena MD   10 mg at 05/06/21 0818    busPIRone (BUSPAR) tablet 5 mg  5 mg Oral TID Daniela Saxena MD   5 mg at 05/06/21 5758    docusate sodium (COLACE) capsule 100 mg  100 mg Oral BID Daniela Saxena MD   100 mg at 05/06/21 0818    escitalopram (LEXAPRO) tablet 10 mg  10 mg Oral Daily Daniela Saxena MD   10 mg at 05/06/21 8270    ferrous sulfate (IRON 325) tablet 325 mg  325 mg Oral Daily with breakfast Daniela Saxena MD   325 mg at 05/06/21 0934    gabapentin (NEURONTIN) capsule 300 mg  300 mg Oral TID Daniela Saxena MD   300 mg at 05/06/21 0818    insulin glargine (LANTUS) injection vial 10 Units  10 Units Subcutaneous Nightly Daniela Saxena MD   10 Units at 05/05/21 2014    insulin lispro (HUMALOG) injection vial 0-10 Units  0-10 Units Subcutaneous TID [facility] Daniela Saxena MD   2 Units at 05/04/21 1044    magnesium hydroxide (MILK OF MAGNESIA) 400 MG/5ML suspension 30 mL  30 mL Oral Daily PRN Daniela Saxena MD   30 mL at 05/06/21 0141    metoprolol succinate (TOPROL XL) extended release tablet 25 mg  25 mg Oral Daily Daniela Saxena MD   25 mg at 05/06/21 0819    oxyCODONE-acetaminophen (PERCOCET) 5-325 MG per tablet 1 tablet  1 tablet Oral Q8H PRN Daniela Saxena MD   1 tablet at 05/06/21 8516    patiromer sorbitex calcium (VELTASSA) packet 8.4 g  8.4 g Oral Daily Daniela Saxena MD   8.4 g at 05/06/21 0818    polyethylene glycol (GLYCOLAX) packet 17 g  17 g Oral Daily Daniela Saxena MD   17 g at 05/06/21 0818    senna (SENOKOT) tablet 17.2 mg  2 tablet Oral injury) ([facility] N17.9       Past Medical History:        Diagnosis Date    Anemia     Anxiety     Arthritis     knees    Chronic knee pain     Chronic pain syndrome     CKD (chronic kidney disease) stage 4, GFR 15-29 ml/min (HCC)     Depression     Diabetes mellitus (HCC)     type 2    Dysphagia, oral phase 2020    Gall stones     Generalized muscle weakness     Hypertension     Hypertensive kidney disease with stage 4 chronic kidney disease (HCC)     Kidney disease     Kidney stones     Obesity     Protein calorie malnutrition (HCC)     Sacral pressure ulcer 2020    Sarcoidosis     SOBOE (shortness of breath on exertion)     Tremor     Unspecified osteoarthritis, unspecified site     Urinary anomaly leakage,urinate>1/night    UTI (urinary tract infection)        Past Surgical History:        Procedure Laterality Date    ABDOMEN SURGERY N/A 2020    SACRAL WOUND DEBRIDEMENT CALL  WITH TIME AVAIL AM performed by Tito Lucio MD at [address] [place] [address]  x3   [address] [place] [address]  3/31/16    Laparoscopic-Dr. Sol [facility]    CYSTOSCOPY       for kidney stones    FOOT SURGERY       right     UPPER GASTROINTESTINAL ENDOSCOPY  2.18.15    Dr. Kaylah Peralta Findings: Mild Gastrits and Duodenitis, 2cm Hiatal Hernia    UPPER GASTROINTESTINAL ENDOSCOPY N/A 2020    EGD CONTROL HEMORRHAGE performed by Tito Lucio MD at [address] N/A 2020    EGD BEDSIDE performed by Loren Mccurdy MD at [facility] History:    Social History     Tobacco Use    Smoking status: Former Smoker     Packs/day: 1.00     Years: 30.00     Pack years: 30.00     Quit date: 2011     Years since quittin.7    Smokeless tobacco: Never Used   Substance Use Topics    Alcohol use:  No                                Counseling given: Not Answered      Vital Signs (Current):   Vitals:    21 2318 21 8951 21 1322 05/06/21 0915   BP: 129/60  (!) 197/76 (!) 200/61   Pulse: 69  67 86   Resp: 16  16    Temp: 36.4 °C (97.6 °F)  36.8 °C (98.2 °F)    TempSrc: Oral  Oral    SpO2: 98%  100%    Weight:  209 lb 3.5 oz (94.9 kg)     Height:                                                  BP Readings from Last 3 Encounters:   05/06/21 (!) 200/61   03/22/21 (!) 149/81   02/17/21 120/83       NPO Status:  PT ate breakfast at 0600 5/6, bowl of oatmeal and toast                                                                               BMI:   Wt Readings from Last 3 Encounters:   05/06/21 209 lb 3.5 oz (94.9 kg)   04/28/21 185 lb (83.9 kg)   04/14/21 190 lb (86.2 kg)     Body mass index is 39.53 kg/m². CBC:   Lab Results   Component Value Date    WBC 10.8 05/04/2021    RBC 2.67 05/04/2021    HGB 8.6 05/04/2021    HCT 29.6 05/04/2021    .9 05/04/2021    RDW 16.3 05/04/2021     05/04/2021       CMP:   Lab Results   Component Value Date     05/06/2021    K 4.6 05/06/2021    K 6.3 05/04/2021     05/06/2021    CO2 27 05/06/2021    BUN 45 05/06/2021    CREATININE 4.2 05/06/2021    GFRAA 13 05/06/2021    LABGLOM 10 05/06/2021    GLUCOSE 96 05/06/2021    GLUCOSE 149 10/12/2011    PROT 6.6 05/04/2021    CALCIUM 8.3 05/06/2021    BILITOT 0.2 05/04/2021    ALKPHOS 171 05/04/2021    AST 17 05/04/2021    ALT 11 05/04/2021       POC Tests: No results for input(s): POCGLU, POCNA, POCK, POCCL, POCBUN, POCHEMO, POCHCT in the last 72 hours.     Coags:   Lab Results   Component Value Date    PROTIME 11.3 05/27/2020    PROTIME 15.3 06/25/2011    INR 1.0 05/27/2020    APTT 24.6 05/21/2020       HCG (If Applicable): No results found for: PREGTESTUR, PREGSERUM, HCG, HCGQUANT     ABGs:   Lab Results   Component Value Date    PO2ART 110.4 05/09/2020    QSW8GTQ 43.7 05/09/2020    QEJ8COF 21.8 05/09/2020    Z7YNFSYM 97.4 06/27/2011        Type & Screen (If Applicable):  No results found for: LABABO, LABRH    Drug/Infectious Status (If Applicable):  No results found for: HIV, HEPCAB    COVID-19 Screening (If Applicable):   Lab Results   Component Value Date    COVID19 Not Detected 01/07/2021    COVID19 Not Detected 05/21/2020           Anesthesia Evaluation  Patient summary reviewed and Nursing notes reviewed no history of anesthetic complications:   Airway: Mallampati: III  TM distance: >3 FB   Neck ROM: full  Mouth opening: > = 3 FB Dental:      Comment: Pt states no loose teeth    Pulmonary:   (+) decreased breath sounds,      (-) shortness of breath                           Cardiovascular:    (+) hypertension: moderate,     (-)  NAVARRO    ECG reviewed  Rhythm: regular  Rate: normal  Echocardiogram reviewed                  Neuro/Psych:   (+) psychiatric history:depression/anxiety             GI/Hepatic/Renal:   (+) renal disease: ESRD,           Endo/Other:    (+) DiabetesType II DM, , : arthritis:., .                  ROS comment: Neurologic gait dysfunction Abdominal:           Vascular:                                      Anesthesia Plan      MAC     ASA 4     (Patient agreed to suspend her DNR status during the perioperative period.)  Induction: intravenous. MIPS: Prophylactic antiemetics administered. Anesthetic plan and risks discussed with patient. Use of blood products discussed with patient whom consented to blood products. Plan discussed with CRNA. Moncho Tate, [address] [place] [address]   5/6/2021  DOS STAFF ADDENDUM:    Pt seen and examined, chart reviewed (including anesthesia, drug and allergy history). Anesthetic plan, risks, benefits, alternatives, and personnel involved discussed with patient. Patient verbalized an understanding and agrees to proceed. Plan discussed with care team members and agreed upon.     Bella Jackson MD  Staff Anesthesiologist  2:31 PM

## 2021-05-06 NOTE — PROGRESS NOTES
[facility] Quality Flow/Interdisciplinary Rounds Progress Note        Quality Flow Rounds held on May 6, 2021    Disciplines Attending:  Bedside Nurse, ,  and Nursing Unit Leadership    Kelton Flynn was admitted on 5/3/2021 11:03 AM    Anticipated Discharge Date:  Expected Discharge Date: 05/07/21    Disposition:    Herbert Score:  Herbert Scale Score: 15    Readmission Risk              Risk of Unplanned Readmission:        41           Discussed patient goal for the day, patient clinical progression, and barriers to discharge. The following Goal(s) of the Day/Commitment(s) have been identified: For dialysis day 2 today, monitor labs and vitals, and discharge planning.     El Nelson  May 6, 2021

## 2021-05-06 NOTE — PROGRESS NOTES
Neurology evaluated the patient and suspected symptoms likely due to compression induced right radial nerve palsy  Recommended PT/OT  Splinting   if does not improve in 1 month outpatient EMG  Physical Therapy    Pt NA for Rx this PM, off the floor at a procedure. Will follow on another date/time.     Veronica Locket PTA [id number]

## 2021-05-06 NOTE — OP NOTE
Operative Note      Patient: Sadia Garay  YOB: 1950  MRN: [id number]    DATE OF PROCEDURE: 5/6/2021     SURGEON: MARSHA Reddyl: None     PREOPERATIVE DIAGNOSIS: Chronic renal failure. POSTOPERATIVE DIAGNOSIS: Same    OPERATION:  90499-10 US guided access of right internal jugular vein  16582  Insertion of right internal jugular vein tunneled hemodialysis catheter    21151-39 with fluoroscopic guidance    Removal of femoral right temporary dialysis catheter     ANESTHESIA: MAC and local     ESTIMATED BLOOD LOSS: Minimal     COMPLICATIONS: None    DESCRIPTION OF PROCEDURE: The patient was identified and the procedure was confirmed. The right neck and chest were prepped and draped in the usual sterile fashion. Next, 1% lidocaine mixed with 0.25% Marcaine was used for local anesthesia. The right internal jugular vein was identified under US, noted to be patent and was percutaneously entered under US guidance. A printer was not available to print an image. A guidewire was advanced into the superior vena cava under fluoroscopic guidance. A small incision was made around the wire. The catheter was pulled through a subcutaneous tunnel from an inferior chest stab incision to the neck incision. The dilators were passed over the wire followed by the introducer. The catheter was passed through the introducer and the tip was positioned in the superior vena cava right atrial junction under fluoroscopic guidance. Both lumens were noted to withdrawal and flush blood easily and were flushed with heparinized saline solution. They catheter was secured to the skin with nylon sutures and the neck incision was approximated with an interrupted Vicryl suture. left groin prepped. Sutures cut, temporary catheter removed and pressure held. Sterile dressings were applied to the incisions in the operating room. Needle, sponge, and instrument counts were reported as correct times two.   The patient tolerated the procedure and was transferred back to the floor in satisfactory condition. CC : Tanya Watson MD         Implants:  Implant Name Type Inv.  Item Serial No.  Lot No. LRB No. Used Action   CATHETER HD PRECRV 15.5 FRX28 CM LT DL BASIC SET TITAN HD  CATHETER HD PRECRV 15.5 FRX28 CM LT DL BASIC SET TITAN HD  [employer] [id number] N/A 1 Implanted         Drains:   External Urinary Catheter (Active)       [REMOVED] Urethral Catheter Non-latex 16 fr (Removed)   Catheter Indications Need for fluid volume management of the critically ill patient in a critical care setting 05/05/21 0808   Site Assessment Moist;Pink 05/06/21 0831   Urine Color Yellow 05/06/21 0831   Urine Appearance Hazy 05/06/21 0831   Output (mL) 275 mL 05/06/21 0831         Electronically signed by Abby Smith MD on 5/6/2021 at 5:50 PM

## 2021-05-07 VITALS
SYSTOLIC BLOOD PRESSURE: 137 MMHG | RESPIRATION RATE: 16 BRPM | OXYGEN SATURATION: 97 % | TEMPERATURE: 98.8 F | HEIGHT: 61 IN | DIASTOLIC BLOOD PRESSURE: 59 MMHG | BODY MASS INDEX: 36.88 KG/M2 | WEIGHT: 195.33 LBS | HEART RATE: 68 BPM

## 2021-05-07 LAB
ANION GAP SERPL CALCULATED.3IONS-SCNC: 10 MMOL/L (ref 7–16)
BUN BLDV-MCNC: 41 MG/DL (ref 6–23)
CALCIUM SERPL-MCNC: 8.4 MG/DL (ref 8.6–10.2)
CHLORIDE BLD-SCNC: 107 MMOL/L (ref 98–107)
CO2: 24 MMOL/L (ref 22–29)
CREAT SERPL-MCNC: 4.3 MG/DL (ref 0.5–1)
GFR AFRICAN AMERICAN: 12
GFR NON-AFRICAN AMERICAN: 10 ML/MIN/1.73
GLUCOSE BLD-MCNC: 98 MG/DL (ref 74–99)
METER GLUCOSE: 139 MG/DL (ref 74–99)
METER GLUCOSE: 198 MG/DL (ref 74–99)
METER GLUCOSE: 84 MG/DL (ref 74–99)
ORGANISM: ABNORMAL
ORGANISM: ABNORMAL
POTASSIUM SERPL-SCNC: 4.2 MMOL/L (ref 3.5–5)
SODIUM BLD-SCNC: 141 MMOL/L (ref 132–146)
URINE CULTURE, ROUTINE: ABNORMAL
URINE CULTURE, ROUTINE: ABNORMAL

## 2021-05-07 PROCEDURE — 6370000000 HC RX 637 (ALT 250 FOR IP): Performed by: INTERNAL MEDICINE

## 2021-05-07 PROCEDURE — 36415 COLL VENOUS BLD VENIPUNCTURE: CPT

## 2021-05-07 PROCEDURE — 90935 HEMODIALYSIS ONE EVALUATION: CPT

## 2021-05-07 PROCEDURE — 82962 GLUCOSE BLOOD TEST: CPT

## 2021-05-07 PROCEDURE — 6360000002 HC RX W HCPCS: Performed by: INTERNAL MEDICINE

## 2021-05-07 PROCEDURE — 80048 BASIC METABOLIC PNL TOTAL CA: CPT

## 2021-05-07 PROCEDURE — 2580000003 HC RX 258: Performed by: FAMILY MEDICINE

## 2021-05-07 PROCEDURE — 6370000000 HC RX 637 (ALT 250 FOR IP): Performed by: FAMILY MEDICINE

## 2021-05-07 PROCEDURE — 6360000002 HC RX W HCPCS: Performed by: FAMILY MEDICINE

## 2021-05-07 RX ORDER — HYDRALAZINE HYDROCHLORIDE 25 MG/1
25 TABLET, FILM COATED ORAL EVERY 8 HOURS SCHEDULED
Status: DISCONTINUED | OUTPATIENT
Start: 2021-05-07 | End: 2021-05-07 | Stop reason: HOSPADM

## 2021-05-07 RX ORDER — HEPARIN SODIUM 1000 [USP'U]/ML
INJECTION, SOLUTION INTRAVENOUS; SUBCUTANEOUS
Status: DISCONTINUED
Start: 2021-05-07 | End: 2021-05-07 | Stop reason: HOSPADM

## 2021-05-07 RX ADMIN — HYDRALAZINE HYDROCHLORIDE 25 MG: 25 TABLET, FILM COATED ORAL at 13:24

## 2021-05-07 RX ADMIN — PETROLATUM: 420 OINTMENT TOPICAL at 11:27

## 2021-05-07 RX ADMIN — FERROUS SULFATE TAB 325 MG (65 MG ELEMENTAL FE) 325 MG: 325 (65 FE) TAB at 08:01

## 2021-05-07 RX ADMIN — Medication 2000 UNITS: at 11:26

## 2021-05-07 RX ADMIN — SODIUM HYPOCHLORITE: 1.25 SOLUTION TOPICAL at 11:27

## 2021-05-07 RX ADMIN — GABAPENTIN 300 MG: 300 CAPSULE ORAL at 13:30

## 2021-05-07 RX ADMIN — SEVELAMER CARBONATE 800 MG: 800 TABLET, FILM COATED ORAL at 08:01

## 2021-05-07 RX ADMIN — DOCUSATE SODIUM 100 MG: 100 CAPSULE, LIQUID FILLED ORAL at 11:26

## 2021-05-07 RX ADMIN — STANDARDIZED SENNA CONCENTRATE 17.2 MG: 8.6 TABLET ORAL at 11:25

## 2021-05-07 RX ADMIN — HEPARIN SODIUM 5000 UNITS: 10000 INJECTION, SOLUTION INTRAVENOUS; SUBCUTANEOUS at 06:14

## 2021-05-07 RX ADMIN — AMLODIPINE BESYLATE 10 MG: 10 TABLET ORAL at 09:41

## 2021-05-07 RX ADMIN — ESCITALOPRAM OXALATE 10 MG: 10 TABLET ORAL at 11:25

## 2021-05-07 RX ADMIN — SODIUM CHLORIDE, PRESERVATIVE FREE 10 ML: 5 INJECTION INTRAVENOUS at 11:25

## 2021-05-07 RX ADMIN — OXYCODONE HYDROCHLORIDE AND ACETAMINOPHEN 1 TABLET: 5; 325 TABLET ORAL at 12:38

## 2021-05-07 RX ADMIN — PATIROMER 8.4 G: 8.4 POWDER, FOR SUSPENSION ORAL at 11:54

## 2021-05-07 RX ADMIN — BUSPIRONE HYDROCHLORIDE 5 MG: 5 TABLET ORAL at 13:23

## 2021-05-07 RX ADMIN — SODIUM POLYSTYRENE SULFONATE 15 G: 15 SUSPENSION ORAL; RECTAL at 11:25

## 2021-05-07 RX ADMIN — POLYETHYLENE GLYCOL 3350 17 G: 17 POWDER, FOR SOLUTION ORAL at 11:26

## 2021-05-07 RX ADMIN — SEVELAMER CARBONATE 800 MG: 800 TABLET, FILM COATED ORAL at 11:32

## 2021-05-07 RX ADMIN — METOPROLOL SUCCINATE 25 MG: 25 TABLET, EXTENDED RELEASE ORAL at 09:41

## 2021-05-07 RX ADMIN — HEPARIN SODIUM 5000 UNITS: 10000 INJECTION, SOLUTION INTRAVENOUS; SUBCUTANEOUS at 13:31

## 2021-05-07 RX ADMIN — SODIUM CHLORIDE, PRESERVATIVE FREE 10 ML: 5 INJECTION INTRAVENOUS at 12:35

## 2021-05-07 RX ADMIN — EPOETIN ALFA-EPBX 2700 UNITS: 3000 INJECTION, SOLUTION INTRAVENOUS; SUBCUTANEOUS at 11:54

## 2021-05-07 ASSESSMENT — PAIN - FUNCTIONAL ASSESSMENT: PAIN_FUNCTIONAL_ASSESSMENT: PREVENTS OR INTERFERES SOME ACTIVE ACTIVITIES AND ADLS

## 2021-05-07 ASSESSMENT — PAIN DESCRIPTION - PROGRESSION: CLINICAL_PROGRESSION: NOT CHANGED

## 2021-05-07 ASSESSMENT — PAIN DESCRIPTION - FREQUENCY: FREQUENCY: INTERMITTENT

## 2021-05-07 ASSESSMENT — PAIN SCALES - GENERAL: PAINLEVEL_OUTOF10: 8

## 2021-05-07 ASSESSMENT — PAIN DESCRIPTION - PAIN TYPE: TYPE: ACUTE PAIN

## 2021-05-07 ASSESSMENT — PAIN DESCRIPTION - ONSET: ONSET: ON-GOING

## 2021-05-07 NOTE — PROGRESS NOTES
Subjective: The patient is awake and alert. No problems overnight. Denies chest pain, angina, and dyspnea. Denies abdominal pain. Tolerating diet. No nausea or vomiting. She doesn't like [employer]. Objective:    BP (!) 163/52   Pulse 62   Temp 98.2 °F (36.8 °C) (Oral)   Resp 16   Ht 5' 1\" (1.549 m)   Wt 209 lb 3.5 oz (94.9 kg)   SpO2 98%   BMI 39.53 kg/m²     Heart:  RRR, no murmurs, gallops, or rubs.   Lungs:  CTA bilaterally, no wheeze, rales or rhonchi  Abd: bowel sounds present, nontender, nondistended, no masses  Extrem:  No clubbing, cyanosis, or edema    CBC with Differential:    Lab Results   Component Value Date    WBC 10.8 05/04/2021    RBC 2.67 05/04/2021    HGB 8.6 05/04/2021    HCT 29.6 05/04/2021     05/04/2021    .9 05/04/2021    MCH 32.2 05/04/2021    MCHC 29.1 05/04/2021    RDW 16.3 05/04/2021    NRBC 0.9 05/25/2020    SEGSPCT 71 08/16/2013    BANDSPCT 1 03/29/2016    METASPCT 0.9 05/10/2020    LYMPHOPCT 8.4 05/04/2021    PROMYELOPCT 0.9 05/09/2020    MONOPCT 5.8 05/04/2021    MYELOPCT 1.7 05/11/2020    BASOPCT 0.4 05/04/2021    MONOSABS 0.62 05/04/2021    LYMPHSABS 0.91 05/04/2021    EOSABS 0.05 05/04/2021    BASOSABS 0.04 05/04/2021     CMP:    Lab Results   Component Value Date     05/07/2021    K 4.2 05/07/2021    K 6.3 05/04/2021     05/07/2021    CO2 24 05/07/2021    BUN 41 05/07/2021    CREATININE 4.3 05/07/2021    GFRAA 12 05/07/2021    LABGLOM 10 05/07/2021    GLUCOSE 98 05/07/2021    GLUCOSE 149 10/12/2011    PROT 6.6 05/04/2021    LABALBU 2.6 05/04/2021    LABALBU 4.1 10/12/2011    CALCIUM 8.4 05/07/2021    BILITOT 0.2 05/04/2021    ALKPHOS 171 05/04/2021    AST 17 05/04/2021    ALT 11 05/04/2021        Assessment:    Patient Active Problem List   Diagnosis    Neurologic gait dysfunction    [facility] failure, acute on chronic ([facility]    Diabetes mellitus ([facility]    Hypertension    Arthritis    Kidney disease    Acute blood loss

## 2021-05-07 NOTE — PROGRESS NOTES
[facility]  Nephrology Attending Progress Note  Key Crowell. Toney Arceo MD        SUBJECTIVE:     5/4:  The pt is a 70 yo female with a pmh of htn, dm, nephrolithiasis, djd, diabetic foot ulcer sarcoidosis who came in from a nursing home for tremors and muscle twitching. She has a pmh of ckd 5 with a baseline cr of 5.4 from12/2021 with gfr of 9. Her labs on admission showed na 141 k 6.4, co2 23, bun 85, cr 6.2, alb 2.6, wbc 16, hgb 9.7, plt 314. She was started on rocephin for uti. She was given insulin, glucose, calcium, kayexalate and hco3 for hyperkalemia. She was started on ns at 75 ml/hr. She was seen by us in the hospital in jan 2021 at which time she was seen for ms changes and hyperkaemia. She was supposed to have an avf/avg placed by dr Tatyana Mann. She saw him a few days ago and it wasn't done yet due to fungal infection in the axilla she tells me. She denies nausea, vomiting, sob, cp, loss of appetite, fatigue. She has asterixis and muscle twitching due to uremia.      5/5: pt seen in room, starting hd today, no cp or sob    5/6: pt seen in room, sp first hd yesterday    5/7: pt seen in room, second hd today, sp tdc yesterday, no complaints    PROBLEM LIST:    Patient Active Problem List   Diagnosis    Neurologic gait dysfunction    Renal failure, acute on chronic ([address]    Diabetes mellitus ([address]    Hypertension    Arthritis    Kidney disease    Acute blood loss anemia    Knee problem    Anemia due to stage 3 chronic kidney disease    Primary osteoarthritis of both knees    Acute on chronic renal insufficiency    Acute kidney injury superimposed on CKD ([address]    Metabolic acidosis    Acute renal failure superimposed on chronic kidney disease, on chronic dialysis ([address]    Sepsis ([address]    2019 novel coronavirus disease (COVID-19)    GI bleed    Moderate protein-calorie malnutrition (HCC)    Hyperkalemia    PERLA (acute kidney injury) ([address]        PAST MEDICAL HISTORY:    Past Medical History: Diagnosis Date    Anemia     Anxiety     Arthritis     knees    Chronic knee pain     Chronic pain syndrome     CKD (chronic kidney disease) stage 4, GFR 15-29 ml/min ([facility])     Depression     Diabetes mellitus ([facility])     type 2    Dysphagia, oral phase 07/31/2020    Gall stones     Generalized muscle weakness     Hypertension     Hypertensive kidney disease with stage 4 chronic kidney disease (HCC)     Kidney disease     Kidney stones     Obesity     Protein calorie malnutrition ([facility])     Sacral pressure ulcer 07/31/2020    Sarcoidosis     SOBOE (shortness of breath on exertion)     Tremor     Unspecified osteoarthritis, unspecified site     Urinary anomaly leakage,urinate>1/night    UTI (urinary tract infection)        DIET:    DIET RENAL;  Dietary Nutrition Supplements: Wound Healing Oral Supplement     PHYSICAL EXAM:     Patient Vitals for the past 24 hrs:   BP Temp Temp src Pulse Resp SpO2 Weight   05/07/21 1115 (!) 187/86 -- -- 67 -- 99 % --   05/07/21 1100 (!) 182/89 98.4 °F (36.9 °C) -- 62 16 -- 195 lb 5.2 oz (88.6 kg)   05/07/21 1030 (!) 186/97 -- -- 78 -- -- --   05/07/21 1000 (!) 181/95 -- -- 78 -- -- --   05/07/21 0930 (!) 205/92 -- -- 73 -- -- --   05/07/21 0900 (!) 164/77 -- -- 71 -- -- --   05/07/21 0830 (!) 190/78 -- -- 67 -- -- --   05/07/21 0823 (!) 196/91 -- -- 69 -- -- --   05/07/21 0820 (!) 216/93 98.4 °F (36.9 °C) -- 69 (!) 69 -- 197 lb 15.6 oz (89.8 kg)   05/07/21 0800 (!) 149/67 97.8 °F (36.6 °C) Oral 67 16 100 % --   05/06/21 1919 (!) 163/52 98.2 °F (36.8 °C) Oral 62 16 98 % --   05/06/21 1832 (!) 109/55 97.8 °F (36.6 °C) Temporal 64 16 100 % --   05/06/21 1817 (!) 117/46 -- -- 62 17 97 % --   05/06/21 1802 (!) 110/50 97.5 °F (36.4 °C) -- 64 16 99 % --   @      Intake/Output Summary (Last 24 hours) at 5/7/2021 1226  Last data filed at 5/7/2021 1100  Gross per 24 hour   Intake 700 ml   Output 1800 ml   Net -1100 ml         Wt Readings from Last 3 Encounters:   05/07/21 195 lb 5.2 oz (88.6 kg)   04/28/21 185 lb (83.9 kg)   04/14/21 190 lb (86.2 kg)       Constitutional:  Pt is in no acute distress  Head: normocephalic, atraumatic  Neck: no JVD  Cardiovascular: regular rate and rhythm, no murmurs, gallops, or rubs  Respiratory:  No rales, rhochi, or wheezes  Gastrointestinal:  Soft, nontender, nondistended, bowel sounds x 4  Ext: no edema  Skin: dry, no rash  Neuro: asterixis    MEDS (scheduled):    heparin (porcine)        [START ON 5/8/2021] sodium zirconium cyclosilicate  5 g Oral Daily    epoetin derek-epbx  30 Units/kg Subcutaneous Once per day on Mon Wed Fri    sodium hypochlorite   Irrigation BID    white petrolatum   Topical BID    amLODIPine  10 mg Oral Daily    busPIRone  5 mg Oral TID    docusate sodium  100 mg Oral BID    escitalopram  10 mg Oral Daily    ferrous sulfate  325 mg Oral Daily with breakfast    gabapentin  300 mg Oral TID    insulin glargine  10 Units Subcutaneous Nightly    insulin lispro  0-10 Units Subcutaneous TID AC    metoprolol succinate  25 mg Oral Daily    polyethylene glycol  17 g Oral Daily    senna  2 tablet Oral BID    sevelamer  800 mg Oral TID WC    SITagliptin  25 mg Oral Daily    sodium polystyrene  15 g Oral BID    Vitamin D  2,000 Units Oral Daily    sodium chloride flush  5-40 mL Intravenous 2 times per day    heparin (porcine)  5,000 Units Subcutaneous 3 times per day       MEDS (infusions):   sodium chloride         MEDS (prn):  acetaminophen, magnesium hydroxide, oxyCODONE-acetaminophen, sodium chloride flush, sodium chloride, promethazine **OR** ondansetron    DATA:    No results for input(s): WBC, HGB, HCT, MCV, PLT in the last 72 hours.   Recent Labs     05/05/21  0547 05/06/21  0550 05/07/21  0536    144 141   K 5.8* 4.6 4.2   * 108* 107   CO2 24 27 24   BUN 73* 45* 41*   CREATININE 5.7* 4.2* 4.3*   LABGLOM 7 10 10   GLUCOSE 84 96 98   CALCIUM 8.1* 8.3* 8.4*   PHOS 6.6*  --   --        Lab Results Component Value Date    LABALBU 2.6 (L) 05/04/2021    LABALBU 2.9 (L) 05/03/2021    LABALBU 2.9 (L) 01/10/2021     Lab Results   Component Value Date    TSH 1.945 08/16/2013       Iron Studies  Lab Results   Component Value Date    IRON 74 05/05/2021    TIBC 116 (L) 05/05/2021    FERRITIN 767 05/05/2021     Vitamin B-12   Date Value Ref Range Status   05/05/2021 895 211 - 946 pg/mL Final     Folate   Date Value Ref Range Status   05/05/2021 >20.0 4.8 - 24.2 ng/mL Final       Vit D, 25-Hydroxy   Date Value Ref Range Status   01/09/2021 13 (L) 30 - 100 ng/mL Final     Comment:     <20 ng/mL. ........... Sueellen Payment Deficient  20-30 ng/mL. ......... Sueellen Payment Insufficient   ng/mL. ........ Sueellen Payment Sufficient  >100 ng/mL. .......... Sueellen Payment Toxic       PTH   Date Value Ref Range Status   05/05/2021 86 (H) 15 - 65 pg/mL Final       No components found for: URIC    Lab Results   Component Value Date    COLORU Yellow 05/03/2021    NITRU POSITIVE 05/03/2021    GLUCOSEU Negative 05/03/2021    GLUCOSEU NEGATIVE 08/17/2011    KETUA Negative 05/03/2021    UROBILINOGEN 0.2 05/03/2021    BILIRUBINUR Negative 05/03/2021    BILIRUBINUR NEGATIVE 08/17/2011       No results found for: Lexis Lujan      IMPRESSION/RECOMMENDATIONS:      1. arf on ckd 5  Baseline cr 5.4 from jan 2021  Muscle tremors likely due to uremia  Low dose ivf  p 6.6, pth 86  Start hd thru temp line ,she is uremic and k is high  2 hr hd 5/5 with 2 k bath 200 bfr  outpt hd arrangements made  For second hd today  tdc placed 5/6     2. Hyperkalemia  In setting of ckd 5  Received medical management  Started hd with improvement in k  Renal diet     3. Anemia  nl b12 fol fe  In setting of ckd 5  Start lorie     4. Leukocytosis  Starting abx for uti  Pan cx  Taken off abx by id    5. Sec hyperparathryoidism  On renvela for p of 6.6  pth 86    6. htn  On norvasc metoprolol  Not controlled  Will add hydralazine     Ok for discharge  We will see her at outpt dialysis  Micaela Naylor.  Nishi Ibarra MD

## 2021-05-07 NOTE — PLAN OF CARE

## 2021-05-07 NOTE — CARE COORDINATION
Social work / Discharge Planning:       Discharge plan is to return to [facility]. No precert needed. Discharge order noted. Social work spoke to liaison from [facility] [employer] and confirmed OP HD chair time for MWF 12:05pm at [facility]. This information has been added to the discharge instructions. Awaiting confirmation from nephrology that patient is ok for discharge today per PCP.     Electronically signed by SHANTA Streeter on 5/7/2021 at 10:14 AM

## 2021-05-07 NOTE — ANESTHESIA POSTPROCEDURE EVALUATION
Department of Anesthesiology  Postprocedure Note    Patient: Cali Carpio  MRN: [id number]  YOB: 1950  Date of evaluation: 5/6/2021  Time:  9:19 PM     Procedure Summary     Date: 05/06/21 Room / Location: [facility] 04 / [address] [place] [address]    Anesthesia Start: 9880 Anesthesia Stop: 1803    Procedure: INSERTION TUNNELED DIALYSIS CATHETER  ++REQ 1430++ (N/A Chest) Diagnosis: (CHRONIC RENAL FAILURE)    Surgeons: Jake Perry MD Responsible Provider: Edith Benitez MD    Anesthesia Type: MAC ASA Status: 4          Anesthesia Type: MAC    Nuzhat Phase I:      Nuzhat Phase II: Nuzhat Score: 10    Last vitals: Reviewed and per EMR flowsheets.        Anesthesia Post Evaluation    Patient location during evaluation: PACU  Patient participation: complete - patient participated  Level of consciousness: awake and alert  Airway patency: patent  Nausea & Vomiting: no vomiting and no nausea  Complications: no  Cardiovascular status: hemodynamically stable  Respiratory status: acceptable  Hydration status: stable

## 2021-05-07 NOTE — FLOWSHEET NOTE
05/07/21 1100   Treatment   Time Off 1053   Vital Signs   BP (!) 182/89   Temp 98.4 °F (36.9 °C)   Pulse 62   Resp 16   Weight 195 lb 5.2 oz (88.6 kg)   Percent Weight Change -1.34   Post-Hemodialysis Assessment   Post-Treatment Procedures Blood returned;Catheter capped, clamped and heparinized x 2 ports   Machine Disinfection Process Acid/Vinegar Clean;Heat Disinfect; Exterior Machine Disinfection   Rinseback Volume (ml) 300 ml   Total Liters Processed (l/min) 35.7 l/min   Dialyzer Clearance Clear   Duration of Treatment (minutes) 150 minutes   Heparin amount administered during treatment (units) 0 units   Hemodialysis Intake (ml) 300 ml   Hemodialysis Output (ml) 1500 ml   NET Removed (ml) 1200 ml   Tolerated Treatment Good   Patient Response to Treatment see note   Tolerated  2.5hr tx well on 3K 2.5Ca bath per orders,  1200ml removed with out difficulty. HD CVC flushed, heparin to dwell, clamped and capped  post tx per policy. Report to floor RN, remains in care of staff.

## 2021-05-08 LAB
BLOOD CULTURE, ROUTINE: NORMAL
CULTURE, BLOOD 2: NORMAL

## 2021-06-10 NOTE — DISCHARGE SUMMARY
Admit Date: 5/3/2021 11:03 AM   Discharge Date: 5/7/2021    Patient Active Problem List   Diagnosis    Neurologic gait dysfunction    Renal failure, acute on chronic ([address]    Diabetes mellitus ([address]    Hypertension    Arthritis    Kidney disease    Acute blood loss anemia    Knee problem    Anemia due to stage 3 chronic kidney disease    Primary osteoarthritis of both knees    Acute on chronic renal insufficiency    Acute kidney injury superimposed on CKD ([address]    Metabolic acidosis    Acute renal failure superimposed on chronic kidney disease, on chronic dialysis ([address]    Sepsis ([address]    2019 novel coronavirus disease (COVID-19)    GI bleed    Moderate protein-calorie malnutrition (HCC)    Hyperkalemia    PERLA (acute kidney injury) ([address]        Present on Admission:   Hyperkalemia   Renal failure, acute on chronic ([address]   Acute kidney injury superimposed on CKD ([address]        Jovanny Blount, [facility]   Home Medication Instructions [id number]    Printed on:06/10/21 8410   Medication Information                      acetaminophen (TYLENOL) 325 MG tablet  Take 650 mg by mouth every 6 hours as needed for Pain or Fever              amLODIPine (NORVASC) 10 MG tablet  Take 10 mg by mouth daily             busPIRone (BUSPAR) 5 MG tablet  Take 5 mg by mouth 3 times daily              docusate sodium (COLACE) 100 MG capsule  Take 100 mg by mouth 2 times daily             epoetin derek (EPOGEN;PROCRIT) 60066 UNIT/ML injection  Inject 80,000 Units into the skin three times a week Indications: HOLD FOR HGB>11 *MON-WED-FRI*             escitalopram (LEXAPRO) 5 MG tablet  Take 10 mg by mouth daily             ferrous sulfate (IRON 325) 325 (65 Fe) MG tablet  Take 1 tablet by mouth daily (with breakfast)             gabapentin (NEURONTIN) 300 MG capsule  Take 300 mg by mouth 3 times daily.               glucagon 1 MG injection  Inject 1 kit into the muscle as needed (HYPOGLYCEMIA)             insulin glargine [facility] AUTHORITY KWIKPEN) 100 UNIT/ML injection pen  Inject 10 Units into the skin nightly             insulin lispro (HUMALOG) 100 UNIT/ML injection vial  Inject 0-10 Units into the skin 3 times daily (before meals) *PER SLIDING SCALE*    - 199  = give 2 units   - 249  = give 4 units   - 299  = give 6 units   - 349  = give 8 units   - 400 =  give 10 units  BS < 70 or > 400 call physician             insulin lispro (HUMALOG) 100 UNIT/ML injection vial  Inject 10 Units into the skin 3 times daily (before meals)             magnesium hydroxide (MILK OF MAGNESIA) 400 MG/5ML suspension  Take 30 mLs by mouth daily as needed for Constipation              metoprolol succinate (TOPROL XL) 25 MG extended release tablet  Take 1 tablet by mouth daily             Nutritional Supplements (GLUCERNA PO)  Take 1 Units by mouth 3 times daily              nystatin (NYSTATIN) 340777 UNIT/GM powder  Apply topically 3 times daily Indications: BILATERAL AXILLA             oxyCODONE-acetaminophen (PERCOCET) 5-325 MG per tablet  Take 1 tablet by mouth every 8 hours as needed for Pain.             patiromer sorbitex calcium (VELTASSA) 8.4 g PACK packet  Take 8.4 g by mouth daily             polyethylene glycol (GLYCOLAX) 17 g packet  Take 17 g by mouth daily             senna (SENOKOT) 8.6 MG tablet  Take 2 tablets by mouth 2 times daily              sevelamer (RENVELA) 800 MG tablet  Take 1 tablet by mouth 3 times daily (with meals)             SITagliptin (JANUVIA) 25 MG tablet  Take 25 mg by mouth daily             sodium polystyrene (KAYEXALATE) 15 GM/60ML suspension  Take 15 g by mouth 2 times daily             Vitamin D (CHOLECALCIFEROL) 50 MCG (2000 UT) TABS tablet  Take 1 tablet by mouth daily             white petrolatum OINT ointment  Apply topically 2 times daily                  Hospital Course/Procedures:69year-old with chronic kidney disease was admitted on 5/3/2021with worsening renal failure, hyperkalemia and uremic symptoms. She was also thought that possibly have a UTI. Infectious disease was consulted and  believed this was asymptomatic bacteriuria and did not need treatment  Nephrology was consulted. Vascular surgery was consulted for hemodialysis catheter placement. Patient underwent hemodialysis which she tolerated well. Arrangements were made for hemodialysis as an outpatient on Monday Wednesday and Fridays by social work. She was discharged back to skilled nursing in stable condition on 5/7/2021. Consultants Following:nephrologycommon infectious disease and vascular surgery    Disposition:back to skilled nursing facility    Follow-up:she will be followed by the facility physician, nephrology and vascular surgery. She will have outpatient hemodialysis on Monday Wednesday and Fridays.       Eugenia Eckert MD  6/10/2021  1:45 PM

## 2021-07-27 ENCOUNTER — TELEPHONE (OUTPATIENT)
Dept: VASCULAR SURGERY | Age: 71
End: 2021-07-27

## 2021-07-28 ENCOUNTER — OFFICE VISIT (OUTPATIENT)
Dept: VASCULAR SURGERY | Age: 71
End: 2021-07-28
Payer: COMMERCIAL

## 2021-07-28 VITALS — DIASTOLIC BLOOD PRESSURE: 68 MMHG | SYSTOLIC BLOOD PRESSURE: 124 MMHG

## 2021-07-28 DIAGNOSIS — N18.6 ENCOUNTER REGARDING VASCULAR ACCESS FOR DIALYSIS FOR ESRD (HCC): ICD-10-CM

## 2021-07-28 DIAGNOSIS — S21.009A BREAST WOUND, INITIAL ENCOUNTER: Primary | ICD-10-CM

## 2021-07-28 DIAGNOSIS — Z99.2 ENCOUNTER REGARDING VASCULAR ACCESS FOR DIALYSIS FOR ESRD (HCC): ICD-10-CM

## 2021-07-28 PROBLEM — S21.002A WOUND OF LEFT BREAST: Chronic | Status: ACTIVE | Noted: 2021-07-28

## 2021-07-28 PROCEDURE — G8400 PT W/DXA NO RESULTS DOC: HCPCS | Performed by: SURGERY

## 2021-07-28 PROCEDURE — 4040F PNEUMOC VAC/ADMIN/RCVD: CPT | Performed by: SURGERY

## 2021-07-28 PROCEDURE — 99213 OFFICE O/P EST LOW 20 MIN: CPT | Performed by: SURGERY

## 2021-07-28 PROCEDURE — 3017F COLORECTAL CA SCREEN DOC REV: CPT | Performed by: SURGERY

## 2021-07-28 PROCEDURE — G8417 CALC BMI ABV UP PARAM F/U: HCPCS | Performed by: SURGERY

## 2021-07-28 PROCEDURE — 1090F PRES/ABSN URINE INCON ASSESS: CPT | Performed by: SURGERY

## 2021-07-28 PROCEDURE — 1036F TOBACCO NON-USER: CPT | Performed by: SURGERY

## 2021-07-28 PROCEDURE — G8427 DOCREV CUR MEDS BY ELIG CLIN: HCPCS | Performed by: SURGERY

## 2021-07-28 PROCEDURE — 1123F ACP DISCUSS/DSCN MKR DOCD: CPT | Performed by: SURGERY

## 2021-07-28 RX ORDER — DIPHENHYDRAMINE HCL 25 MG
25 TABLET ORAL NIGHTLY PRN
COMMUNITY
End: 2021-11-17

## 2021-07-28 RX ORDER — FLUCONAZOLE 100 MG/1
100 TABLET ORAL DAILY
COMMUNITY
End: 2021-08-03

## 2021-07-28 NOTE — PROGRESS NOTES
Vascular Surgery Outpatient Follow Up      Chief Complaint   Patient presents with    Circulatory Problem     Dialysis days M-W-F, patient is right handed       HISTORY OF PRESENT ILLNESS:                The patient is a 69 y.o. female who is referred for evaluation of hemodialysis access. Vascular surgery was consulted while patient was admitted to the hospital in 1/2021 as she was hyperkalemic and had worsening kidney function. Immediate dialysis was not needed at that time so she is here for outpatient evaluation for HD access. Pt had chronic kidney failure which was worsened by covid infection in 5/2020. Her function has since been declining and long term dialysis is likely inevitable. She has never had previous vascular access for dialysis. She is right handed. She denies hx of dvt, pacer or defibrillator. She currently is resided at [facility] [address]. She no longer ambulates. As above. She was placed on antifungal cream for her upper extremity. She has been applying the cream on a regular basis. Otherwise she is doing very well and this is essentially resolved. We will plan on an arteriovenous access creation.   Also she has a breast wound which was examined see below    Past Medical History:        Diagnosis Date    Anemia     Anxiety     Arthritis     knees    Chronic knee pain     Chronic pain syndrome     CKD (chronic kidney disease) stage 4, GFR 15-29 ml/min ([facility])     Depression     Diabetes mellitus ([facility])     type 2    Dysphagia, oral phase 07/31/2020    Gall stones     Generalized muscle weakness     Hypertension     Hypertensive kidney disease with stage 4 chronic kidney disease (HCC)     Kidney disease     Kidney stones     Obesity     Protein calorie malnutrition (HCC)     Sacral pressure ulcer 07/31/2020    Sarcoidosis     SOBOE (shortness of breath on exertion)     Tremor     Unspecified osteoarthritis, unspecified site     Urinary anomaly leakage,urinate>1/night    UTI (urinary tract infection)      Past Surgical History:        Procedure Laterality Date    ABDOMEN SURGERY N/A 5/4/2020    SACRAL WOUND DEBRIDEMENT CALL  WITH TIME AVAIL AM performed by Hilaria Hope MD at [address]  x3   [facility]  3/31/16    Laparoscopic-Dr. Low Escobar    CYSTOSCOPY  2011     for kidney stones    FOOT SURGERY  2009     right     UPPER GASTROINTESTINAL ENDOSCOPY  2.18.15    Dr. Erlin Ward Findings: Mild Gastrits and Duodenitis, 2cm Hiatal Hernia    UPPER GASTROINTESTINAL ENDOSCOPY N/A 5/8/2020    EGD CONTROL HEMORRHAGE performed by Hilaria Hope MD at [facility] 5/22/2020    EGD BEDSIDE performed by Mickey Mei MD at [address] N/A 5/6/2021    INSERTION TUNNELED DIALYSIS CATHETER performed by Teri Mcbride MD at [facility] OR     Current Medications:   Prior to Admission medications    Medication Sig Start Date End Date Taking?  Authorizing Provider   diphenhydrAMINE (BENADRYL) 25 MG tablet Take 25 mg by mouth nightly as needed for Itching   Yes Historical Provider, MD   fluconazole (DIFLUCAN) 100 MG tablet Take 100 mg by mouth daily   Yes Historical Provider, MD   white petrolatum OINT ointment Apply topically 2 times daily 5/7/21  Yes Anna Ramirez MD   epoetin derek (EPOGEN;PROCRIT) 26190 UNIT/ML injection Inject 80,000 Units into the skin three times a week Indications: HOLD FOR HGB>11 *MON-WED-FRI*   Yes Historical Provider, MD   docusate sodium (COLACE) 100 MG capsule Take 100 mg by mouth 2 times daily   Yes Historical Provider, MD   nystatin (NYSTATIN) 764371 UNIT/GM powder Apply topically 3 times daily Indications: BILATERAL AXILLA   Yes Historical Provider, MD   glucagon 1 MG injection Inject 1 kit into the muscle as needed (HYPOGLYCEMIA)   Yes Historical Provider, MD   insulin lispro (HUMALOG) 100 UNIT/ML injection vial Inject 10 Units into the skin 3 times daily (before meals)   Yes Historical Provider, MD   sevelamer (RENVELA) 800 MG tablet Take 1 tablet by mouth 3 times daily (with meals) 11/19/20  Yes Pretty Harrison MD   Vitamin D (CHOLECALCIFEROL) 50 MCG (2000 UT) TABS tablet Take 1 tablet by mouth daily 11/20/20  Yes Pretty Harrison MD   Nutritional Supplements (GLUCERNA PO) Take 1 Units by mouth 3 times daily    Yes Historical Provider, MD   magnesium hydroxide (MILK OF MAGNESIA) 400 MG/5ML suspension Take 30 mLs by mouth daily as needed for Constipation    Yes Historical Provider, MD   metoprolol succinate (TOPROL XL) 25 MG extended release tablet Take 1 tablet by mouth daily 10/12/20  Yes Sreekanth Everett MD   escitalopram (LEXAPRO) 5 MG tablet Take 10 mg by mouth daily   Yes Historical Provider, MD   gabapentin (NEURONTIN) 300 MG capsule Take 300 mg by mouth 3 times daily. Yes Historical Provider, MD   insulin lispro (HUMALOG) 100 UNIT/ML injection vial Inject 0-10 Units into the skin 3 times daily (before meals) *PER SLIDING SCALE*    - 199  = give 2 units   - 249  = give 4 units   - 299  = give 6 units   - 349  = give 8 units   - 400 =  give 10 units  BS < 70 or > 400 call physician   Yes Historical Provider, MD   amLODIPine (NORVASC) 10 MG tablet Take 10 mg by mouth daily   Yes Historical Provider, MD   insulin glargine (BASAGLAR KWIKPEN) 100 UNIT/ML injection pen Inject 10 Units into the skin nightly   Yes Historical Provider, MD   busPIRone (BUSPAR) 5 MG tablet Take 5 mg by mouth 3 times daily    Yes Historical Provider, MD   oxyCODONE-acetaminophen (PERCOCET) 7.5-325 MG per tablet Take 1 tablet by mouth every 8 hours as needed for Pain.     Yes Historical Provider, MD   senna (SENOKOT) 8.6 MG tablet Take 2 tablets by mouth 2 times daily    Yes Historical Provider, MD   acetaminophen (TYLENOL) 325 MG tablet Take 650 mg by mouth every 6 hours as needed for Pain or Fever    Yes Historical Provider, MD   patiromer sorbitex calcium (VELTASSA) 8.4 g PACK packet Take 8.4 g by mouth daily    Historical Provider, MD   sodium polystyrene (KAYEXALATE) 15 GM/60ML suspension Take 15 g by mouth 2 times daily    Historical Provider, MD   SITagliptin (JANUVIA) 25 MG tablet Take 25 mg by mouth daily    Historical Provider, MD   ferrous sulfate (IRON 325) 325 (65 Fe) MG tablet Take 1 tablet by mouth daily (with breakfast) 10/12/20   Falguni Martinez MD   polyethylene glycol (GLYCOLAX) 17 g packet Take 17 g by mouth daily    Historical Provider, MD     Allergies:  Patient has no known allergies. Social History     Socioeconomic History    Marital status:      Spouse name: Not on file    Number of children: Not on file    Years of education: Not on file    Highest education level: Not on file   Occupational History    Not on file   Tobacco Use    Smoking status: Former Smoker     Packs/day: 1.00     Years: 30.00     Pack years: 30.00     Quit date: 2011     Years since quittin.9    Smokeless tobacco: Never Used   Vaping Use    Vaping Use: Never used   Substance and Sexual Activity    Alcohol use: No    Drug use: No    Sexual activity: Not Currently   Other Topics Concern    Not on file   Social History Narrative    Not on file     Social Determinants of Health     Financial Resource Strain:     Difficulty of Paying Living Expenses:    Food Insecurity:     Worried About Running Out of Food in the Last Year:     [address] in the Last Year:    Transportation Needs:     Lack of Transportation (Medical):      Lack of Transportation (Non-Medical):    Physical Activity:     Days of Exercise per Week:     Minutes of Exercise per Session:    Stress:     Feeling of Stress :    Social Connections:     Frequency of Communication with Friends and Family:     Frequency of Social Gatherings with Friends and Family:     Attends Jewish Services:     Active Member of Clubs or Organizations:     Attends Club or Organization Meetings:     Marital Status:    Intimate Partner Violence:     Fear of Current or Ex-Partner:     Emotionally Abused:     Physically Abused:     Sexually Abused:         Family History   Problem Relation Age of Onset    Cancer Sister      PHYSICAL EXAM:  Vitals:    07/28/21 0907   BP: 124/68     General Appearance: alert and oriented to person, place and time, well developed and well- nourished, on gourney  Skin: warm and dry, no rash or erythema she has a wound of the left breast with excoriation. Head: normocephalic and atraumatic  Eyes: extraocular eye movements intact, conjunctivae normal  ENT:  external ear and ear canal normal bilaterally, nose without deformity  Pulmonary/Chest: clear to auscultation bilaterally- no wheezes, rales or rhonchi, normal air movement, no respiratory distress  Cardiovascular: normal rate, regular rhythm  Abdomen: soft, non-tender, non-distended  Musculoskeletal: normal range of motion of the UEs, no joint swelling, deformity or tenderness  Neurologic: no cranial nerve deficit, gait, coordination and speech normal  Extremities: Left upper extremity tinea has resolved. PULSE EXAM      Right      Left   Brachial  2   Radial 1 1   Femoral     Popliteal     Dorsalis Pedis     Posterior Tibial     (3=normal, 2=diminished, 1=barely palpable, 4=widened)      Problem List Items Addressed This Visit     None      Visit Diagnoses     Breast wound, initial encounter    -  Primary    Relevant Orders    Muna Shaikh MD, General Surgery, [facility]    Encounter regarding vascular access for dialysis for ESRD [facility])            Pt presenting for evaluation for dialysis access. She had a left axillary rash. Overall this is essentially resolved. She is still applying the powder on a regular basis. So we can plan on proceeding with an arteriovenous access. Most likely a graft secondary to her size.   Also she is a breast doing currently this is new she thinks it was from sweating based on the physical examination with my PA at the bedside when to make sure that this is not a breast cancer able to send her to a general surgeon for follow-up. No follow-ups on file.

## 2021-08-03 NOTE — PROGRESS NOTES
[facility] PRE-ADMISSION TESTING INSTRUCTIONS      ARRIVAL INSTRUCTIONS:  [x] Parking the day of Surgery is located in the Main Entrance lot. Upon entering the main door make an immediate right to the surgery reception desk.     [x] Bring photo ID and insurance card          GENERAL INSTRUCTIONS:    [] Nothing by mouth after midnight, including gum, candy, mints or water    [x] You may brush your teeth, but do not swallow any water    [x] Take medications as instructed with 1-2 oz of water    [x] Stop herbal supplements and vitamins 5 days prior to procedure    [x] Take 1/2 dose of evening insulin, but no insulin after midnight    [x] No oral diabetic medications after midnight    [x] If diabetic and have low blood sugar or feel symptomatic, take 1-2oz apple juice only    [x] Shower or bath with soap, lather and rinse well, AM of Surgery, no lotion, powders or creams to surgical site    [x] Jewelry, body piercing's, eyeglasses, contact lenses and dentures are not permitted into surgery (bring cases)      [x] Please do not wear any nail polish, make up or hair products on the day of surgery    [x] You can expect a call the business day prior to procedure to notify you if your arrival time changes

## 2021-08-03 NOTE — PROGRESS NOTES
Pt currently residing at [facility]. Phone assessment completed with Ryne Cedillo RN, DON. Pt reported to be A/O and signs own consents. She is nonambulatory and will arrive in [facility] with Count includes the [facility] ambulette. She is roger lift for transfers (wt 192#). Code status is [id number] ([facility] at surgeons office notified). Pt is on HD M-W-F via right chest TESIO. She is not in isolation at [facility]. Pt has excoriation under left breast and abdominal fold with daily treatments. Sacrum has pressure ulcer stage 4, and will be covered day of surgery.   Rapid Covid  to be done day of surgery  Preop instruction reviewed and faxed yes

## 2021-08-04 ENCOUNTER — ANESTHESIA EVENT (OUTPATIENT)
Dept: OPERATING ROOM | Age: 71
End: 2021-08-04
Payer: COMMERCIAL

## 2021-08-05 ENCOUNTER — HOSPITAL ENCOUNTER (OUTPATIENT)
Age: 71
Setting detail: OUTPATIENT SURGERY
Discharge: OTHER INSTITUTION | End: 2021-08-05
Attending: SURGERY | Admitting: SURGERY
Payer: COMMERCIAL

## 2021-08-05 ENCOUNTER — ANESTHESIA (OUTPATIENT)
Dept: OPERATING ROOM | Age: 71
End: 2021-08-05
Payer: COMMERCIAL

## 2021-08-05 VITALS
HEART RATE: 70 BPM | DIASTOLIC BLOOD PRESSURE: 48 MMHG | TEMPERATURE: 96.8 F | BODY MASS INDEX: 32.88 KG/M2 | OXYGEN SATURATION: 99 % | RESPIRATION RATE: 14 BRPM | WEIGHT: 192.6 LBS | HEIGHT: 64 IN | SYSTOLIC BLOOD PRESSURE: 122 MMHG

## 2021-08-05 VITALS
SYSTOLIC BLOOD PRESSURE: 129 MMHG | DIASTOLIC BLOOD PRESSURE: 58 MMHG | RESPIRATION RATE: 16 BRPM | OXYGEN SATURATION: 96 %

## 2021-08-05 PROBLEM — Z99.2 ENCOUNTER REGARDING VASCULAR ACCESS FOR DIALYSIS FOR END-STAGE RENAL DISEASE (HCC): Status: ACTIVE | Noted: 2021-08-05

## 2021-08-05 PROBLEM — N18.6 ENCOUNTER REGARDING VASCULAR ACCESS FOR DIALYSIS FOR END-STAGE RENAL DISEASE (HCC): Status: ACTIVE | Noted: 2021-08-05

## 2021-08-05 LAB
ABO/RH: NORMAL
ANION GAP SERPL CALCULATED.3IONS-SCNC: 10 MMOL/L (ref 7–16)
ANTIBODY SCREEN: NORMAL
BUN BLDV-MCNC: 18 MG/DL (ref 6–23)
CALCIUM SERPL-MCNC: 8.9 MG/DL (ref 8.6–10.2)
CHLORIDE BLD-SCNC: 99 MMOL/L (ref 98–107)
CO2: 32 MMOL/L (ref 22–29)
CREAT SERPL-MCNC: 3.6 MG/DL (ref 0.5–1)
GFR AFRICAN AMERICAN: 15
GFR NON-AFRICAN AMERICAN: 12 ML/MIN/1.73
GLUCOSE BLD-MCNC: 96 MG/DL (ref 74–99)
HCT VFR BLD CALC: 27.6 % (ref 34–48)
HEMOGLOBIN: 8.4 G/DL (ref 11.5–15.5)
INR BLD: 1.1
MCH RBC QN AUTO: 32.3 PG (ref 26–35)
MCHC RBC AUTO-ENTMCNC: 30.4 % (ref 32–34.5)
MCV RBC AUTO: 106.2 FL (ref 80–99.9)
PDW BLD-RTO: 14.3 FL (ref 11.5–15)
PLATELET # BLD: 285 E9/L (ref 130–450)
PMV BLD AUTO: 9.2 FL (ref 7–12)
POTASSIUM REFLEX MAGNESIUM: 4.3 MMOL/L (ref 3.5–5)
PROTHROMBIN TIME: 11.5 SEC (ref 9.3–12.4)
RBC # BLD: 2.6 E12/L (ref 3.5–5.5)
SARS-COV-2, NAAT: NOT DETECTED
SODIUM BLD-SCNC: 141 MMOL/L (ref 132–146)
WBC # BLD: 7.6 E9/L (ref 4.5–11.5)

## 2021-08-05 PROCEDURE — 86900 BLOOD TYPING SEROLOGIC ABO: CPT

## 2021-08-05 PROCEDURE — 7100000000 HC PACU RECOVERY - FIRST 15 MIN: Performed by: SURGERY

## 2021-08-05 PROCEDURE — 36821 AV FUSION DIRECT ANY SITE: CPT | Performed by: SURGERY

## 2021-08-05 PROCEDURE — 2709999900 HC NON-CHARGEABLE SUPPLY: Performed by: SURGERY

## 2021-08-05 PROCEDURE — 36415 COLL VENOUS BLD VENIPUNCTURE: CPT

## 2021-08-05 PROCEDURE — 6360000002 HC RX W HCPCS: Performed by: ANESTHESIOLOGY

## 2021-08-05 PROCEDURE — 3700000001 HC ADD 15 MINUTES (ANESTHESIA): Performed by: SURGERY

## 2021-08-05 PROCEDURE — 6360000002 HC RX W HCPCS: Performed by: NURSE PRACTITIONER

## 2021-08-05 PROCEDURE — 85027 COMPLETE CBC AUTOMATED: CPT

## 2021-08-05 PROCEDURE — 64415 NJX AA&/STRD BRCH PLXS IMG: CPT | Performed by: ANESTHESIOLOGY

## 2021-08-05 PROCEDURE — 6370000000 HC RX 637 (ALT 250 FOR IP): Performed by: SURGERY

## 2021-08-05 PROCEDURE — 3600000013 HC SURGERY LEVEL 3 ADDTL 15MIN: Performed by: SURGERY

## 2021-08-05 PROCEDURE — 3700000000 HC ANESTHESIA ATTENDED CARE: Performed by: SURGERY

## 2021-08-05 PROCEDURE — 2500000003 HC RX 250 WO HCPCS: Performed by: SURGERY

## 2021-08-05 PROCEDURE — 85610 PROTHROMBIN TIME: CPT

## 2021-08-05 PROCEDURE — 3600000003 HC SURGERY LEVEL 3 BASE: Performed by: SURGERY

## 2021-08-05 PROCEDURE — 86901 BLOOD TYPING SEROLOGIC RH(D): CPT

## 2021-08-05 PROCEDURE — 7100000011 HC PHASE II RECOVERY - ADDTL 15 MIN: Performed by: SURGERY

## 2021-08-05 PROCEDURE — 7100000010 HC PHASE II RECOVERY - FIRST 15 MIN: Performed by: SURGERY

## 2021-08-05 PROCEDURE — 6360000002 HC RX W HCPCS: Performed by: NURSE ANESTHETIST, CERTIFIED REGISTERED

## 2021-08-05 PROCEDURE — 80048 BASIC METABOLIC PNL TOTAL CA: CPT

## 2021-08-05 PROCEDURE — 7100000001 HC PACU RECOVERY - ADDTL 15 MIN: Performed by: SURGERY

## 2021-08-05 PROCEDURE — 87635 SARS-COV-2 COVID-19 AMP PRB: CPT

## 2021-08-05 PROCEDURE — 86850 RBC ANTIBODY SCREEN: CPT

## 2021-08-05 PROCEDURE — 2580000003 HC RX 258: Performed by: NURSE ANESTHETIST, CERTIFIED REGISTERED

## 2021-08-05 RX ORDER — FENTANYL CITRATE 50 UG/ML
INJECTION, SOLUTION INTRAMUSCULAR; INTRAVENOUS PRN
Status: DISCONTINUED | OUTPATIENT
Start: 2021-08-05 | End: 2021-08-05 | Stop reason: SDUPTHER

## 2021-08-05 RX ORDER — HEPARIN SODIUM 1000 [USP'U]/ML
INJECTION, SOLUTION INTRAVENOUS; SUBCUTANEOUS PRN
Status: DISCONTINUED | OUTPATIENT
Start: 2021-08-05 | End: 2021-08-05 | Stop reason: SDUPTHER

## 2021-08-05 RX ORDER — ROPIVACAINE HYDROCHLORIDE 5 MG/ML
INJECTION, SOLUTION EPIDURAL; INFILTRATION; PERINEURAL
Status: COMPLETED | OUTPATIENT
Start: 2021-08-05 | End: 2021-08-05

## 2021-08-05 RX ORDER — FENTANYL CITRATE 50 UG/ML
50 INJECTION, SOLUTION INTRAMUSCULAR; INTRAVENOUS EVERY 5 MIN PRN
Status: DISCONTINUED | OUTPATIENT
Start: 2021-08-05 | End: 2021-08-05 | Stop reason: HOSPADM

## 2021-08-05 RX ORDER — MIDAZOLAM HYDROCHLORIDE 2 MG/2ML
1 INJECTION, SOLUTION INTRAMUSCULAR; INTRAVENOUS PRN
Status: DISCONTINUED | OUTPATIENT
Start: 2021-08-05 | End: 2021-08-05 | Stop reason: HOSPADM

## 2021-08-05 RX ORDER — DIPHENHYDRAMINE HYDROCHLORIDE 50 MG/ML
12.5 INJECTION INTRAMUSCULAR; INTRAVENOUS
Status: DISCONTINUED | OUTPATIENT
Start: 2021-08-05 | End: 2021-08-05 | Stop reason: HOSPADM

## 2021-08-05 RX ORDER — PROPOFOL 10 MG/ML
INJECTION, EMULSION INTRAVENOUS PRN
Status: DISCONTINUED | OUTPATIENT
Start: 2021-08-05 | End: 2021-08-05 | Stop reason: SDUPTHER

## 2021-08-05 RX ORDER — ROPIVACAINE HYDROCHLORIDE 5 MG/ML
30 INJECTION, SOLUTION EPIDURAL; INFILTRATION; PERINEURAL ONCE
Status: DISCONTINUED | OUTPATIENT
Start: 2021-08-05 | End: 2021-08-05 | Stop reason: HOSPADM

## 2021-08-05 RX ORDER — FENTANYL CITRATE 50 UG/ML
25 INJECTION, SOLUTION INTRAMUSCULAR; INTRAVENOUS EVERY 5 MIN PRN
Status: DISCONTINUED | OUTPATIENT
Start: 2021-08-05 | End: 2021-08-05 | Stop reason: HOSPADM

## 2021-08-05 RX ORDER — PROTAMINE SULFATE 10 MG/ML
INJECTION, SOLUTION INTRAVENOUS PRN
Status: DISCONTINUED | OUTPATIENT
Start: 2021-08-05 | End: 2021-08-05 | Stop reason: SDUPTHER

## 2021-08-05 RX ORDER — MEPERIDINE HYDROCHLORIDE 25 MG/ML
12.5 INJECTION INTRAMUSCULAR; INTRAVENOUS; SUBCUTANEOUS EVERY 5 MIN PRN
Status: DISCONTINUED | OUTPATIENT
Start: 2021-08-05 | End: 2021-08-05 | Stop reason: HOSPADM

## 2021-08-05 RX ORDER — PROMETHAZINE HYDROCHLORIDE 25 MG/ML
6.25 INJECTION, SOLUTION INTRAMUSCULAR; INTRAVENOUS
Status: DISCONTINUED | OUTPATIENT
Start: 2021-08-05 | End: 2021-08-05 | Stop reason: HOSPADM

## 2021-08-05 RX ORDER — ROPIVACAINE HYDROCHLORIDE 5 MG/ML
INJECTION, SOLUTION EPIDURAL; INFILTRATION; PERINEURAL
Status: COMPLETED
Start: 2021-08-05 | End: 2021-08-05

## 2021-08-05 RX ORDER — MIDAZOLAM HYDROCHLORIDE 1 MG/ML
INJECTION INTRAMUSCULAR; INTRAVENOUS
Status: DISCONTINUED
Start: 2021-08-05 | End: 2021-08-05 | Stop reason: HOSPADM

## 2021-08-05 RX ORDER — SODIUM CHLORIDE 9 MG/ML
25 INJECTION, SOLUTION INTRAVENOUS PRN
Status: DISCONTINUED | OUTPATIENT
Start: 2021-08-05 | End: 2021-08-05 | Stop reason: HOSPADM

## 2021-08-05 RX ORDER — OXYCODONE HYDROCHLORIDE AND ACETAMINOPHEN 5; 325 MG/1; MG/1
1 TABLET ORAL
Status: DISCONTINUED | OUTPATIENT
Start: 2021-08-05 | End: 2021-08-05 | Stop reason: HOSPADM

## 2021-08-05 RX ORDER — SODIUM CHLORIDE 9 MG/ML
INJECTION, SOLUTION INTRAVENOUS CONTINUOUS
Status: DISCONTINUED | OUTPATIENT
Start: 2021-08-05 | End: 2021-08-05 | Stop reason: HOSPADM

## 2021-08-05 RX ORDER — SODIUM CHLORIDE 9 MG/ML
INJECTION, SOLUTION INTRAVENOUS CONTINUOUS PRN
Status: DISCONTINUED | OUTPATIENT
Start: 2021-08-05 | End: 2021-08-05 | Stop reason: SDUPTHER

## 2021-08-05 RX ORDER — SODIUM CHLORIDE 0.9 % (FLUSH) 0.9 %
5-40 SYRINGE (ML) INJECTION PRN
Status: DISCONTINUED | OUTPATIENT
Start: 2021-08-05 | End: 2021-08-05 | Stop reason: HOSPADM

## 2021-08-05 RX ORDER — SODIUM CHLORIDE 0.9 % (FLUSH) 0.9 %
5-40 SYRINGE (ML) INJECTION EVERY 12 HOURS SCHEDULED
Status: DISCONTINUED | OUTPATIENT
Start: 2021-08-05 | End: 2021-08-05 | Stop reason: HOSPADM

## 2021-08-05 RX ORDER — FENTANYL CITRATE 50 UG/ML
50 INJECTION, SOLUTION INTRAMUSCULAR; INTRAVENOUS PRN
Status: DISCONTINUED | OUTPATIENT
Start: 2021-08-05 | End: 2021-08-05 | Stop reason: HOSPADM

## 2021-08-05 RX ADMIN — FENTANYL CITRATE 25 MCG: 50 INJECTION, SOLUTION INTRAMUSCULAR; INTRAVENOUS at 11:17

## 2021-08-05 RX ADMIN — PROPOFOL 30 MG: 10 INJECTION, EMULSION INTRAVENOUS at 10:18

## 2021-08-05 RX ADMIN — PROTAMINE SULFATE 30 MG: 10 INJECTION, SOLUTION INTRAVENOUS at 11:24

## 2021-08-05 RX ADMIN — FENTANYL CITRATE 25 MCG: 50 INJECTION, SOLUTION INTRAMUSCULAR; INTRAVENOUS at 10:18

## 2021-08-05 RX ADMIN — ROPIVACAINE HYDROCHLORIDE 25 ML: 5 INJECTION, SOLUTION EPIDURAL; INFILTRATION; PERINEURAL at 09:57

## 2021-08-05 RX ADMIN — Medication 2000 MG: at 10:22

## 2021-08-05 RX ADMIN — PROPOFOL 75 MCG/KG/MIN: 10 INJECTION, EMULSION INTRAVENOUS at 10:19

## 2021-08-05 RX ADMIN — HEPARIN SODIUM 8700 UNITS: 1000 INJECTION, SOLUTION INTRAVENOUS; SUBCUTANEOUS at 10:54

## 2021-08-05 RX ADMIN — SODIUM CHLORIDE: 9 INJECTION, SOLUTION INTRAVENOUS at 10:07

## 2021-08-05 ASSESSMENT — PULMONARY FUNCTION TESTS
PIF_VALUE: 2
PIF_VALUE: 1
PIF_VALUE: 2
PIF_VALUE: 5
PIF_VALUE: 3
PIF_VALUE: 1
PIF_VALUE: 2
PIF_VALUE: 1
PIF_VALUE: 2
PIF_VALUE: 1
PIF_VALUE: 1
PIF_VALUE: 2
PIF_VALUE: 3
PIF_VALUE: 2
PIF_VALUE: 1
PIF_VALUE: 1
PIF_VALUE: 2
PIF_VALUE: 1
PIF_VALUE: 2
PIF_VALUE: 1
PIF_VALUE: 1
PIF_VALUE: 2
PIF_VALUE: 1
PIF_VALUE: 1
PIF_VALUE: 2
PIF_VALUE: 1
PIF_VALUE: 26
PIF_VALUE: 0
PIF_VALUE: 1
PIF_VALUE: 2
PIF_VALUE: 1
PIF_VALUE: 2
PIF_VALUE: 1
PIF_VALUE: 2
PIF_VALUE: 1
PIF_VALUE: 2
PIF_VALUE: 1
PIF_VALUE: 1
PIF_VALUE: 2
PIF_VALUE: 1
PIF_VALUE: 2
PIF_VALUE: 1
PIF_VALUE: 2
PIF_VALUE: 3
PIF_VALUE: 2
PIF_VALUE: 1
PIF_VALUE: 2
PIF_VALUE: 1
PIF_VALUE: 2
PIF_VALUE: 2
PIF_VALUE: 1
PIF_VALUE: 2
PIF_VALUE: 1
PIF_VALUE: 2
PIF_VALUE: 15
PIF_VALUE: 26
PIF_VALUE: 1

## 2021-08-05 ASSESSMENT — PAIN DESCRIPTION - PAIN TYPE
TYPE: SURGICAL PAIN

## 2021-08-05 ASSESSMENT — PAIN SCALES - GENERAL
PAINLEVEL_OUTOF10: 0

## 2021-08-05 ASSESSMENT — ENCOUNTER SYMPTOMS: SHORTNESS OF BREATH: 0

## 2021-08-05 ASSESSMENT — PAIN - FUNCTIONAL ASSESSMENT: PAIN_FUNCTIONAL_ASSESSMENT: 0-10

## 2021-08-05 NOTE — H&P
Vascular Surgery History & Physical Exam      Chief Complaint: CRF    HISTORY OF PRESENT ILLNESS:                The patient is a 69 y.o. female who presents to the hospital for elective creation of a arteriovenous fistula. The patient denies any problems since the last office visit. IMPRESSION:   Active Hospital Problems    Diagnosis     Encounter regarding vascular access for dialysis [facility] [N18.6, Z99.2]        PLAN:  Creation of a left arm arteriovenous fistula, possible graft. I reviewed the procedure with the patient. I discussed the risks, benefits, and alternatives of the procedure. The patient understands and consents. All questions were answered. Patient was seen and examined. Agree with above. In the office I initially thought I could feel her radial artery however I cannot on today's examination. I did an ultrasound examination there is flow in the radial and ulnar. There is calcific atherosclerotic disease throughout the radial artery and some of the ulnar vessel. The brachial artery also demonstrate some calcification above the antecubital crease. Right below the antecubital crease it is pulsatile. There is good flow on color Doppler. The basilic and cephalic vein appear to be adequate for arteriovenous access creation. Initially were talked about an arteriovenous graft. But if the vein is adequate I think him to try to perform a fistula and later transposed or elevate the access. I went into great detail with the patient regarding an additional surgical procedure for elevation/transposition. She understands and wishes to proceed. I also talked with her about the calcific atherosclerotic disease of her vessel and the risk associated with that.   See below    Risk benefits and alternatives were discussed with the patient including but not limited to bleeding, infection, arterial venous nerve injury, arterial steal, venous hypertension, sensorimotor disturbance or loss, maintenance of the access, need for additional access, loss of the access, if an AV graft needs to be used risk of infection and/or removal, distal embolization, wound complications, limb loss and/or death the patient understands and wishes to proceed. Simone      Patient Active Problem List   Diagnosis Code    Neurologic gait dysfunction R26.9    Renal failure, acute on chronic (HCC) N17.9, N18.9    Diabetes mellitus ([address] E11.9    Hypertension I10    Arthritis M19.90    Kidney disease N28.9    Acute blood loss anemia D62    Knee problem M25.9    Anemia due to stage 3 chronic kidney disease N18.30, D63.1    Primary osteoarthritis of both knees M17.0    Acute on chronic renal insufficiency N28.9, N18.9    Acute kidney injury superimposed on CKD (HCC) N17.9, I70.6    Metabolic acidosis M48.9    Acute renal failure superimposed on chronic kidney disease, on chronic dialysis (HCC) N17.9, N18.9, Z99.2    Sepsis ([address] A41.9    2019 novel coronavirus disease (COVID-19) U07.1    GI bleed K92.2    Moderate protein-calorie malnutrition (HCC) E44.0    Hyperkalemia E87.5    PERLA (acute kidney injury) ([address] N17.9    Wound of left breast S21.002A    End stage renal disease (HCC) N18.6    Encounter regarding vascular access for dialysis for end-stage renal disease ([address] N18.6, Z99.2       Past Medical History:   Diagnosis Date    Anxiety and depression     Arthritis     COVID-19 05/2020    Diabetes mellitus ([address]     Dysphagia, oral phase     Hemodialysis patient ([address]     M-W-F    Hypertension     Hypertensive kidney disease with stage 4 chronic kidney disease ([address]     Kidney stones     Obesity     Sacral pressure ulcer 08/03/2021    stage 4    Unspecified osteoarthritis, unspecified site     Wound of left breast         Past Surgical History:   Procedure Laterality Date    ABDOMEN SURGERY N/A 5/4/2020    SACRAL WOUND DEBRIDEMENT CALL   WITH TIME AVAIL AM performed by Isaiah Sinclair MD at [address] [place] [address]  x3    CHOLECYSTECTOMY  3/31/16    Laparoscopic-Dr. Leonela Rodriguez  2011     for kidney stones    FOOT SURGERY  2009     right     UPPER GASTROINTESTINAL ENDOSCOPY  2.18.15    Dr. Saskia Rowland, [facility] Findings: Mild Gastrits and Duodenitis, 2cm Hiatal Hernia    UPPER GASTROINTESTINAL ENDOSCOPY N/A 5/8/2020    EGD CONTROL HEMORRHAGE performed by Isaiah Sinclair MD at [address] [place] [address] Rd 5/22/2020    EGD BEDSIDE performed by Jose Black MD at [facility] N/A 5/6/2021    INSERTION TUNNELED DIALYSIS CATHETER performed by Rande Dandy, MD at [facility] OR       Current Medications:     Current Facility-Administered Medications:     0.9 % sodium chloride infusion, , Intravenous, Continuous, Hector Lights, APRN - CNP    sodium chloride flush 0.9 % injection 5-40 mL, 5-40 mL, Intravenous, 2 times per day, Hector Lights, APRN - CNP    sodium chloride flush 0.9 % injection 5-40 mL, 5-40 mL, Intravenous, PRN, Hector Lights, APRN - CNP    0.9 % sodium chloride infusion, 25 mL, Intravenous, PRN, Hector Lights, APRN - CNP    ceFAZolin (ANCEF) 2000 mg in sterile water 20 mL IV syringe, 2,000 mg, Intravenous, On Call to OR, Hector Lights, APRN - CNP    meperidine (DEMEROL) injection 12.5 mg, 12.5 mg, Intravenous, Q5 Min PRN, Candy Lisa MD    fentaNYL (SUBLIMAZE) injection 25 mcg, 25 mcg, Intravenous, Q5 Min PRN, Candy Lisa MD    fentaNYL (SUBLIMAZE) injection 50 mcg, 50 mcg, Intravenous, Q5 Min PRN, Candy Lisa MD    HYDROmorphone (DILAUDID) injection 0.25 mg, 0.25 mg, Intravenous, Q5 Min PRN, Candy Lsia MD    HYDROmorphone (DILAUDID) injection 0.5 mg, 0.5 mg, Intravenous, Q5 Min PRN, Candy Lisa MD    oxyCODONE-acetaminophen (PERCOCET) 5-325 MG per tablet 1 tablet, 1 tablet, Oral, Once PRN, Candy Lisa MD    promethazine (PHENERGAN) injection 6.25 mg, 6.25 mg, Intravenous, Q15 Min PRN, Michael Raza MD    diphenhydrAMINE (BENADRYL) injection 12.5 mg, 12.5 mg, Intravenous, Once PRN, Michael Raza MD    Allergies:  Patient has no known allergies. Social History     Socioeconomic History    Marital status:      Spouse name: Not on file    Number of children: Not on file    Years of education: Not on file    Highest education level: Not on file   Occupational History    Not on file   Tobacco Use    Smoking status: Former Smoker     Packs/day: 1.00     Years: 30.00     Pack years: 30.00     Quit date: 2011     Years since quittin.9    Smokeless tobacco: Never Used   Vaping Use    Vaping Use: Never used   Substance and Sexual Activity    Alcohol use: No    Drug use: No    Sexual activity: Not on file   Other Topics Concern    Not on file   Social History Narrative    Not on file     Social Determinants of Health     Financial Resource Strain:     Difficulty of Paying Living Expenses:    Food Insecurity:     Worried About Running Out of Food in the Last Year:     [address] in the Last Year:    Transportation Needs:     Lack of Transportation (Medical):  Lack of Transportation (Non-Medical):    Physical Activity:     Days of Exercise per Week:     Minutes of Exercise per Session:    Stress:     Feeling of Stress :    Social Connections:     Frequency of Communication with Friends and Family:     Frequency of Social Gatherings with Friends and Family:     Attends Anabaptist Services:     Active Member of Clubs or Organizations:     Attends Club or Organization Meetings:     Marital Status:    Intimate Partner Violence:     Fear of Current or Ex-Partner:     Emotionally Abused:     Physically Abused:     Sexually Abused:         Family History   Problem Relation Age of Onset    Cancer Sister        REVIEW OF SYSTEMS:  The chart was reviewed.     PHYSICAL EXAM:    Vitals:    21 0755   BP: 127/62   Pulse: 73 Resp: 18   Temp: 96.7 °F (35.9 °C)   SpO2: 100%     CONSTITUTIONAL:  awake, alert, cooperative, no apparent distress  LUNGS:  no increased work of breathing, good air exchange   CARDIOVASCULAR:  regular rate and rhythm  ABDOMEN:  soft, non-distended and non-tender    LABS:    Lab Results   Component Value Date    WBC 7.6 08/05/2021    HGB 8.4 (L) 08/05/2021    HCT 27.6 (L) 08/05/2021     08/05/2021    PROTIME 11.5 08/05/2021    INR 1.1 08/05/2021    APTT 24.6 05/21/2020    K 4.3 08/05/2021    BUN 18 08/05/2021    CREATININE 3.6 (H) 08/05/2021       RADIOLOGY:

## 2021-08-05 NOTE — ANESTHESIA PRE PROCEDURE
Department of Anesthesiology  Preprocedure Note       Name:  Ezio Topete   Age:  69 y.o.  :  1950                                          MRN:  [id number]         Date:  2021      Surgeon: Herlinda Juares):  Bryant Cardoso MD    Procedure: Procedure(s):  INSERTION AV GRAFT LEFT ARM  ++PNB++    Medications prior to admission:   Prior to Admission medications    Medication Sig Start Date End Date Taking?  Authorizing Provider   Sulfamethoxazole-Trimethoprim (BACTRIM DS PO) Take by mouth 7/31/21 8/10/21 Yes Historical Provider, MD   bisacodyl (DULCOLAX) 5 MG EC tablet Take 10 mg by mouth daily as needed for Constipation   Yes Historical Provider, MD   diphenhydrAMINE (BENADRYL) 25 MG tablet Take 25 mg by mouth nightly as needed for Itching   Yes Historical Provider, MD   docusate sodium (COLACE) 100 MG capsule Take 100 mg by mouth 2 times daily   Yes Historical Provider, MD   nystatin (NYSTATIN) 615765 UNIT/GM powder Apply topically 3 times daily Indications: BILATERAL AXILLA Abdominal folds   Yes Historical Provider, MD   glucagon 1 MG injection Inject 1 kit into the muscle as needed (HYPOGLYCEMIA)   Yes Historical Provider, MD   insulin lispro (HUMALOG) 100 UNIT/ML injection vial Inject 10 Units into the skin 3 times daily (before meals)   Yes Historical Provider, MD   Vitamin D (CHOLECALCIFEROL) 50 MCG ( UT) TABS tablet Take 1 tablet by mouth daily 20  Yes Harper Vizcarra MD   SITagliptin (JANUVIA) 25 MG tablet Take 25 mg by mouth daily   Yes Historical Provider, MD   magnesium hydroxide (MILK OF MAGNESIA) 400 MG/5ML suspension Take 30 mLs by mouth daily as needed for Constipation    Yes Historical Provider, MD   metoprolol succinate (TOPROL XL) 25 MG extended release tablet Take 1 tablet by mouth daily 10/12/20  Yes Antonio Jackson MD   escitalopram (LEXAPRO) 5 MG tablet Take 10 mg by mouth daily   Yes Historical Provider, MD   gabapentin (NEURONTIN) 300 MG capsule Take 300 mg by mouth 3 times daily. Yes Historical Provider, MD   amLODIPine (NORVASC) 10 MG tablet Take 10 mg by mouth daily   Yes Historical Provider, MD   insulin glargine (BASAGLAR KWIKPEN) 100 UNIT/ML injection pen Inject 10 Units into the skin nightly   Yes Historical Provider, MD   busPIRone (BUSPAR) 5 MG tablet Take 5 mg by mouth 3 times daily    Yes Historical Provider, MD   oxyCODONE-acetaminophen (PERCOCET) 7.5-325 MG per tablet Take 1 tablet by mouth every 8 hours as needed for Pain.     Yes Historical Provider, MD   polyethylene glycol (GLYCOLAX) 17 g packet Take 17 g by mouth daily   Yes Historical Provider, MD   senna (SENOKOT) 8.6 MG tablet Take 2 tablets by mouth 2 times daily    Yes Historical Provider, MD   acetaminophen (TYLENOL) 325 MG tablet Take 650 mg by mouth every 6 hours as needed for Pain or Fever    Yes Historical Provider, MD       Current medications:    Current Facility-Administered Medications   Medication Dose Route Frequency Provider Last Rate Last Admin    0.9 % sodium chloride infusion   Intravenous Continuous Billy Alpha, APRN - CNP        sodium chloride flush 0.9 % injection 5-40 mL  5-40 mL Intravenous 2 times per day Billy Alpha, APRN - CNP        sodium chloride flush 0.9 % injection 5-40 mL  5-40 mL Intravenous PRN Billy Alpha, APRN - CNP        0.9 % sodium chloride infusion  25 mL Intravenous PRN Billy Alpha, APRN - CNP        ceFAZolin (ANCEF) 2000 mg in sterile water 20 mL IV syringe  2,000 mg Intravenous On Call to [facility] [place], PABLO - CNP           Allergies:  No Known Allergies    Problem List:    Patient Active Problem List   Diagnosis Code    Neurologic gait dysfunction R26.9    Renal failure, acute on chronic (HCC) N17.9, N18.9    Diabetes mellitus ([facility] E11.9    Hypertension I10    Arthritis M19.90    Kidney disease N28.9    Acute blood loss anemia D62    Knee problem M25.9    Anemia due to stage 3 chronic kidney disease N18.30, D63.1    Primary osteoarthritis of both knees M17.0    Acute on chronic renal insufficiency N28.9, N18.9    Acute kidney injury superimposed on CKD (HCC) N17.9, D74.7    Metabolic acidosis X86.1    Acute renal failure superimposed on chronic kidney disease, on chronic dialysis (HCC) N17.9, N18.9, Z99.2    Sepsis ([facility] A41.9    2019 novel coronavirus disease (COVID-19) U07.1    GI bleed K92.2    Moderate protein-calorie malnutrition (HCC) E44.0    Hyperkalemia E87.5    PERLA (acute kidney injury) ([address] N17.9    Wound of left breast S21.002A    End stage renal disease (HCC) N18.6       Past Medical History:        Diagnosis Date    Anxiety and depression     Arthritis     COVID-19 05/2020    Diabetes mellitus ([facility] [address]     Dysphagia, oral phase     Hemodialysis patient ([facility] [address]     M-W-F    Hypertension     Hypertensive kidney disease with stage 4 chronic kidney disease ([facility] [facility]     Kidney stones     Obesity     Sacral pressure ulcer 08/03/2021    stage 4    Unspecified osteoarthritis, unspecified site     Wound of left breast        Past Surgical History:        Procedure Laterality Date    ABDOMEN SURGERY N/A 5/4/2020    SACRAL WOUND DEBRIDEMENT CALL DRWatson  WITH TIME AVAIL AM performed by Jenae Miramontes MD at [address] [place] [address]  x3   [address] [place] [address]  3/31/16    Laparoscopic-Dr. Kale Kauffman    CYSTOSCOPY  2011     for kidney stones    FOOT SURGERY  2009     right     UPPER GASTROINTESTINAL ENDOSCOPY  2.18.15    Dr. Barbara Hashimoto Findings: Mild Gastrits and Duodenitis, 2cm Hiatal Hernia    UPPER GASTROINTESTINAL ENDOSCOPY N/A 5/8/2020    EGD CONTROL HEMORRHAGE performed by Jenae Miramontes MD at [facility] 5/22/2020    EGD BEDSIDE performed by Griffin Arreaga MD at [facility]. [address] N/A 5/6/2021    INSERTION TUNNELED DIALYSIS CATHETER performed by Wilder Islas MD at [address] [place] [address] History:    Social History     Tobacco Use    Smoking status: Former Smoker     Packs/day: 1.00     Years: 30.00     Pack years: 30.00     Quit date: 2011     Years since quittin.9    Smokeless tobacco: Never Used   Substance Use Topics    Alcohol use: No                                Counseling given: Not Answered      Vital Signs (Current):   Vitals:    21 1410 21 0755   BP:  127/62   Pulse:  73   Resp:  18   Temp:  96.7 °F (35.9 °C)   SpO2:  100%   Weight: 192 lb 9.6 oz (87.4 kg)    Height: 5' 4\" (1.626 m)                                               BP Readings from Last 3 Encounters:   21 127/62   21 124/68   21 (!) 137/59       NPO Status: Time of last liquid consumption:                         Time of last solid consumption:                         Date of last liquid consumption: 21                        Date of last solid food consumption: 21    BMI:   Wt Readings from Last 3 Encounters:   21 192 lb 9.6 oz (87.4 kg)   21 195 lb 5.2 oz (88.6 kg)   21 185 lb (83.9 kg)     Body mass index is 33.06 kg/m². CBC:   Lab Results   Component Value Date    WBC 7.6 2021    RBC 2.60 2021    HGB 8.4 2021    HCT 27.6 2021    .2 2021    RDW 14.3 2021     2021       CMP:   Lab Results   Component Value Date     2021    K 3.9 2021    K 6.3 2021     2021    CO2 24 2021    BUN 41 2021    CREATININE 4.3 2021    GFRAA 12 2021    LABGLOM 10 2021    GLUCOSE 98 2021    GLUCOSE 149 10/12/2011    PROT 6.6 2021    CALCIUM 8.4 2021    BILITOT 0.2 2021    ALKPHOS 171 2021    AST 17 2021    ALT 11 2021       POC Tests: No results for input(s): POCGLU, POCNA, POCK, POCCL, POCBUN, POCHEMO, POCHCT in the last 72 hours.     Coags:   Lab Results   Component Value Date    PROTIME 11.5 2021    PROTIME 15.3 2011    INR 1.1 2021    APTT Plan discussed with CRNA.                 Gopi Yap MD   8/5/2021

## 2021-08-05 NOTE — ANESTHESIA PROCEDURE NOTES
Peripheral Block    Patient location during procedure: pre-op  Start time: 8/5/2021 9:48 AM  End time: 8/5/2021 9:57 AM  Staffing  Performed: anesthesiologist   Anesthesiologist: Sarah Carey MD  Preanesthetic Checklist  Completed: patient identified, IV checked, site marked, risks and benefits discussed, surgical consent, monitors and equipment checked, pre-op evaluation, timeout performed, anesthesia consent given, oxygen available and patient being monitored  Peripheral Block  Patient position: supine  Prep: ChloraPrep  Patient monitoring: cardiac monitor, continuous pulse ox, frequent blood pressure checks and IV access  Block type: Brachial plexus  Laterality: left  Injection technique: single-shot  Guidance: ultrasound guided  Local infiltration: ropivacaine  Infiltration strength: 0.5 %  Dose: 1 mL  Interscalene  Provider prep: mask and sterile gloves  Local infiltration: ropivacaine  Needle  Needle type: combined needle/nerve stimulator   Needle gauge: 21 G  Needle length: 10 cm  Needle localization: ultrasound guidance  Assessment  Injection assessment: negative aspiration for heme, no paresthesia on injection and local visualized surrounding nerve on ultrasound  Paresthesia pain: none  Slow fractionated injection: yes  Hemodynamics: stable  Additional Notes  Total of 2mg IV midazolam and 50mcg IV fentanyl given. Patient had accessory blood vessels which would be transected if supraclavicular nerve block was done. Therefore, decision made to do interscalene nerve block. No complications. Patient tolerated procedure well.   Medications Administered  Ropivacaine (NAROPIN) injection 0.5%, 25 mL  Reason for block: primary anesthetic and at surgeon's request

## 2021-08-05 NOTE — ANESTHESIA POSTPROCEDURE EVALUATION
Department of Anesthesiology  Postprocedure Note    Patient: Aziza Melendez  MRN: [id number]  YOB: 1950  Date of evaluation: 8/5/2021  Time:  7:39 PM     Procedure Summary     Date: 08/05/21 Room / Location: [facility] 09 / 106 [facility]    Anesthesia Start: 1007 Anesthesia Stop: 1450    Procedure: INSERTION FISTULA LEFT ARM (Left Arm Upper) Diagnosis: (CHRONIC RENAL FAILURE)    Surgeons: Saul Johnson MD Responsible Provider: Fabiola Leigh MD    Anesthesia Type: MAC, regional ASA Status: 4          Anesthesia Type: MAC, regional    Nuzhat Phase I: Nuzhat Score: 9    Nuzhat Phase II: Nuzhat Score: 9    Last vitals: Reviewed and per EMR flowsheets.        Anesthesia Post Evaluation    Patient location during evaluation: PACU  Patient participation: complete - patient participated  Level of consciousness: awake  Airway patency: patent  Nausea & Vomiting: no vomiting and no nausea  Complications: no  Cardiovascular status: hemodynamically stable  Respiratory status: acceptable  Hydration status: stable

## 2021-08-05 NOTE — OP NOTE
Operative Note      Patient: Akila Gomez  YOB: 1950  MRN: [id number]     DATE OF PROCEDURE: 8/5/2021     SURGEON: Liliana Ness M.D.     ASSISTANT: Elo Nguyen NP     PREOPERATIVE DIAGNOSIS: Chronic renal failure, Stage 5 on hemodialysis. POSTOPERATIVE DIAGNOSIS: Same    OPERATION: Creation of left proximal left radial artery arteriovenous fistula using the antebrachial vein. Therefore the median cephalic and median basilic are still open. We will see which one develops better. Findings: She had severe calcific atherosclerotic disease which was nonocclusive. The brachial artery is noncompressible. Distal to that the proximal radial artery had a pulse in it and was calcified but soft. This was widely patent with pulsatile flow after an arteriotomy was made. The ulnar was also patent. This was the best spot to sew to otherwise I thought the brachial artery could be a surgical issue. Preoperatively her blood pressure was low. Also intraoperatively her blood pressure was low. At 1 point we considered an arteriovenous graft prior to the procedure secondary to her size. The vein was adequate. She has calcific disease as stated above. And her blood pressure runs low. My concern would be supporting an arteriovenous graft. I think her best option is to try an arteriovenous fistula. If this does not work secondary to her severe calcific disease I probably recommend an axillary loop graft or consideration for peritoneal dialysis versus continued the tunneled catheter. ANESTHESIA: Regional, local and LMAC     ESTIMATED BLOOD LOSS: Minimal     COMPLICATIONS: None    DESCRIPTION OF PROCEDURE: The patient was identified and the procedure was confirmed. The left arm was prepped and draped in the usual sterile fashion. Lidocaine 1% mixed with 0.25% Marcaine was used for local anesthesia.  A transverse skin incision was made in the antecubital fossa and carried down through the subcutaneous tissue. The cephalic vein and the median basilic vein were identified and dissected free from the surrounding tissues. The deep branch was isolated. As well as the antebrachial vein the vein appeared to be good quality for the fistula. It was marked for orientation and branches were divided between silk ties. It was ligated distally and divided. Medially, the brachial artery was identified and freed from the surrounding tissues. There was severe calcific atherosclerotic disease and was noncompressible consistent with the preoperative bedside ultrasound the proximal radial artery was of good caliber and pulsatile and was the only soft vessel at the bifurcation. All 3 vessels ulnar/brachial and radial were encircled with Vesseloops. Additional  clamps had to be used. The patient was heparinized and the artery was clamped proximally and distally. A longitudinal arteriotomy measuring 5 mm was made. The vein was cut to the appropriate length and spatulated and anastomosed to the side of the artery using a running 6-0 Prolene suture. After completing the anastomosis, the clamps were released and a thrill was appreciated through the fistula. A radial signal and ulnar signal was appreciated in the wrist. Hemostasis was obtained and the incisions were irrigated with saline solution. The incision was approximated with Vicryl sutures and Dermabond was applied to the incisions in the operating room. Needle, sponge and instrument counts were reported as correct x2. The patient tolerated the procedure and was transferred to the recovery area in satisfactory condition. Brissa Ryan CNP was scrubbed and participated in the entire procedure including incision, exposure, anastomosis, and closure. There was no qualified general surgery resident available.     William Blanco MD    CC : Se Gordon MD        Drains:   External Urinary Catheter (Active)         Electronically signed by Dodie Peacock Sue Irby MD on 8/5/2021 at 11:28 AM

## 2021-08-11 ENCOUNTER — HOSPITAL ENCOUNTER (OUTPATIENT)
Dept: WOUND CARE | Age: 71
Discharge: HOME | End: 2021-08-11
Payer: COMMERCIAL

## 2021-08-11 VITALS
HEART RATE: 74 BPM | SYSTOLIC BLOOD PRESSURE: 116 MMHG | TEMPERATURE: 97.5 F | DIASTOLIC BLOOD PRESSURE: 70 MMHG | RESPIRATION RATE: 18 BRPM

## 2021-08-11 DIAGNOSIS — L89.154 PRESSURE INJURY OF SACRAL REGION, STAGE 4 (HCC): ICD-10-CM

## 2021-08-11 DIAGNOSIS — L89.213 PRESSURE INJURY OF RIGHT HIP, STAGE 3 (HCC): ICD-10-CM

## 2021-08-11 PROCEDURE — 87075 CULTR BACTERIA EXCEPT BLOOD: CPT

## 2021-08-11 PROCEDURE — 99203 OFFICE O/P NEW LOW 30 MIN: CPT | Performed by: SURGERY

## 2021-08-11 PROCEDURE — 11042 DBRDMT SUBQ TIS 1ST 20SQCM/<: CPT | Performed by: SURGERY

## 2021-08-11 PROCEDURE — 11046 DBRDMT MUSC&/FSCA EA ADDL: CPT | Performed by: SURGERY

## 2021-08-11 PROCEDURE — 87070 CULTURE OTHR SPECIMN AEROBIC: CPT

## 2021-08-11 PROCEDURE — 87077 CULTURE AEROBIC IDENTIFY: CPT

## 2021-08-11 PROCEDURE — 11045 DBRDMT SUBQ TISS EACH ADDL: CPT

## 2021-08-11 PROCEDURE — 87186 SC STD MICRODIL/AGAR DIL: CPT

## 2021-08-11 PROCEDURE — 99213 OFFICE O/P EST LOW 20 MIN: CPT

## 2021-08-11 PROCEDURE — 11043 DBRDMT MUSC&/FSCA 1ST 20/<: CPT | Performed by: SURGERY

## 2021-08-11 PROCEDURE — 11042 DBRDMT SUBQ TIS 1ST 20SQCM/<: CPT

## 2021-08-11 RX ORDER — CLOBETASOL PROPIONATE 0.5 MG/G
OINTMENT TOPICAL ONCE
Status: CANCELLED | OUTPATIENT
Start: 2021-08-11 | End: 2021-08-11

## 2021-08-11 RX ORDER — LIDOCAINE HYDROCHLORIDE 40 MG/ML
SOLUTION TOPICAL ONCE
Status: DISCONTINUED | OUTPATIENT
Start: 2021-08-11 | End: 2021-08-12 | Stop reason: HOSPADM

## 2021-08-11 RX ORDER — GINSENG 100 MG
CAPSULE ORAL ONCE
Status: CANCELLED | OUTPATIENT
Start: 2021-08-11 | End: 2021-08-11

## 2021-08-11 RX ORDER — BACITRACIN ZINC AND POLYMYXIN B SULFATE 500; 1000 [USP'U]/G; [USP'U]/G
OINTMENT TOPICAL ONCE
Status: CANCELLED | OUTPATIENT
Start: 2021-08-11 | End: 2021-08-11

## 2021-08-11 RX ORDER — LIDOCAINE 40 MG/G
CREAM TOPICAL ONCE
Status: CANCELLED | OUTPATIENT
Start: 2021-08-11 | End: 2021-08-11

## 2021-08-11 RX ORDER — BACITRACIN, NEOMYCIN, POLYMYXIN B 400; 3.5; 5 [USP'U]/G; MG/G; [USP'U]/G
OINTMENT TOPICAL ONCE
Status: CANCELLED | OUTPATIENT
Start: 2021-08-11 | End: 2021-08-11

## 2021-08-11 RX ORDER — LIDOCAINE HYDROCHLORIDE 20 MG/ML
JELLY TOPICAL ONCE
Status: CANCELLED | OUTPATIENT
Start: 2021-08-11 | End: 2021-08-11

## 2021-08-11 RX ORDER — BETAMETHASONE DIPROPIONATE 0.05 %
OINTMENT (GRAM) TOPICAL ONCE
Status: CANCELLED | OUTPATIENT
Start: 2021-08-11 | End: 2021-08-11

## 2021-08-11 RX ORDER — LIDOCAINE HYDROCHLORIDE 40 MG/ML
SOLUTION TOPICAL ONCE
Status: CANCELLED | OUTPATIENT
Start: 2021-08-11 | End: 2021-08-11

## 2021-08-11 RX ORDER — GENTAMICIN SULFATE 1 MG/G
OINTMENT TOPICAL ONCE
Status: CANCELLED | OUTPATIENT
Start: 2021-08-11 | End: 2021-08-11

## 2021-08-11 RX ORDER — LIDOCAINE 50 MG/G
OINTMENT TOPICAL ONCE
Status: CANCELLED | OUTPATIENT
Start: 2021-08-11 | End: 2021-08-11

## 2021-08-11 ASSESSMENT — PAIN SCALES - GENERAL: PAINLEVEL_OUTOF10: 0

## 2021-08-11 NOTE — PROGRESS NOTES
Wound Healing Center /Hyperbarics   History and Physical/Consultation  General Surgery/Medicine    Referring Physician : Tristan Grayson MD  [facility] RECORD NUMBER:  [id number]  AGE: 69 y.o. GENDER: female  : 1950  EPISODE DATE:  2021  Subjective:     Chief Complaint   Patient presents with    Wound Check     Coccyx and abdomen         HISTORY of PRESENT ILLNESS HPI     Hannah Fitzpatrick is a 69 y.o. female who presents today for wound/ulcer evaluation. History of Wound Context:  The patient has had a wounds of coccyx and right hip which was first noted approximately 6 monthsduration. This has been treated at the nursing home. On their initial visit to the wound healing center,  the patient has noted that the wounds has not been improving. The patient has had similar previous wounds in the past.      Pt is not on abx at time of initial visit. Wound/Ulcer Pain Timing/Severity: intermittent  Quality of pain: burning  Severity:  5 / 10   Modifying Factors: Pain worsens with motion  Associated Signs/Symptoms: drainage    Ulcer Identification:  Ulcer Type: pressure  Contributing Factors: decreased mobility            PAST MEDICAL HISTORY      Diagnosis Date    Anxiety and depression     Arthritis     COVID-19 2020    Diabetes mellitus ([address]     Dysphagia, oral phase     Hemodialysis patient ([address]     M-W-F    Hypertension     Hypertensive kidney disease with stage 4 chronic kidney disease ([address]     Kidney stones     Obesity     Sacral pressure ulcer 2021    stage 4    Unspecified osteoarthritis, unspecified site     Wound of left breast      Past Surgical History:   Procedure Laterality Date    ABDOMEN SURGERY N/A 2020    SACRAL WOUND DEBRIDEMENT CALL   WITH TIME AVAIL AM performed by Magdy Watson MD at [address] [place] [address]  x3    CHOLECYSTECTOMY  3/31/16    Laparoscopic-Dr. Tamara Silvestre    CYSTOSCOPY       for kidney stones    DIALYSIS FISTULA CREATION Left 8/5/2021    INSERTION FISTULA LEFT ARM performed by Saul Johnson MD at [place] [address]  2009     right     UPPER GASTROINTESTINAL ENDOSCOPY  2.18.15    Dr. Luis E Merino Findings: Mild Gastrits and Duodenitis, 2cm Hiatal Hernia    UPPER GASTROINTESTINAL ENDOSCOPY N/A 5/8/2020    EGD CONTROL HEMORRHAGE performed by Brendan Saxena MD at [facility] N/A 5/22/2020    EGD BEDSIDE performed by Vandana Parham MD at [facility]. [address] N/A 5/6/2021    INSERTION TUNNELED DIALYSIS CATHETER performed [facility] Saul Johnson MD at [facility] OR     Family History   Problem Relation Age of Onset    Cancer Sister      Social History     Tobacco Use    Smoking status: Former Smoker     Packs/day: 1.00     Years: 30.00     Pack years: 30.00     Quit date: 8/13/2011     Years since quitting: 10.0    Smokeless tobacco: Never Used   Vaping Use    Vaping Use: Never used   Substance Use Topics    Alcohol use: No    Drug use: No     No Known Allergies  Current Outpatient Medications on File Prior to Encounter   Medication Sig Dispense Refill    bisacodyl (DULCOLAX) 5 MG EC tablet Take 10 mg by mouth daily as needed for Constipation      diphenhydrAMINE (BENADRYL) 25 MG tablet Take 25 mg by mouth nightly as needed for Itching      docusate sodium (COLACE) 100 MG capsule Take 100 mg by mouth 2 times daily      nystatin (NYSTATIN) 528225 UNIT/GM powder Apply topically 3 times daily Indications: BILATERAL AXILLA Abdominal folds      glucagon 1 MG injection Inject 1 kit into the muscle as needed (HYPOGLYCEMIA)      insulin lispro (HUMALOG) 100 UNIT/ML injection vial Inject 10 Units into the skin 3 times daily (before meals)      Vitamin D (CHOLECALCIFEROL) 50 MCG (2000 UT) TABS tablet Take 1 tablet by mouth daily 60 tablet     SITagliptin (JANUVIA) 25 MG tablet Take 25 mg by mouth daily      magnesium hydroxide (MILK OF MAGNESIA) 400 MG/5ML suspension ears and nose without lesions      Hearing deficit is not noted  NECK: Supple, symmetrical, trachea midline      Thyroid goiter not appreciated     LUNGS:  No increased work of breathing                    Clear to auscultation bilaterally     CARDIOVASCULAR:  regular rate and rhythm     ABDOMEN:  soft, non-distended, non-tender      Hernias is not noted  Lymphatics : Cervical lymphadenopathy is not noted     Femoral lymphadenopathy is not noted  SKIN:   Skin color is normal   Texture and turgor is  normal   Induration is not noted  EXTREMITIES:   R UE Edema is not noted  L UE Edema is not noted  R LE Edema is not noted  L LE Edema is not noted  Assessment:     Problem List Items Addressed This Visit     Pressure injury of sacral region, stage 4 (HCC)    Pressure injury of right hip, stage 3 (HCC)          Pre Debridement Measurements:  Are located in the Industry  Documentation Flow Sheet  Post Debridement Measurements:  Wound/Ulcer Descriptions are Pre Debridement except measurements:     Wound 10/10/20 Coccyx (Active)   Number of days: 305       Wound 01/08/21 Coccyx (Active)   Number of days: 215       Wound 05/03/21 Abdomen Right (Active)   Number of days: 99       Wound 08/11/21 Coccyx #1 (Active)   Wound Image   08/11/21 1325   Wound Length (cm) 8.4 cm 08/11/21 1325   Wound Width (cm) 3.7 cm 08/11/21 1325   Wound Depth (cm) 2 cm 08/11/21 1325   Wound Surface Area (cm^2) 31.08 cm^2 08/11/21 1325   Wound Volume (cm^3) 62.16 cm^3 08/11/21 1325   Wound Assessment Pink/red;Slough 08/11/21 1325   Drainage Amount Moderate 08/11/21 1325   Drainage Description Serosanguinous; Yellow 08/11/21 1325   Odor None 08/11/21 1325   Carmen-wound Assessment Maceration 08/11/21 1325   Number of days: 0       Wound 08/11/21 Buttocks Right #2 (Active)   Wound Image   08/11/21 1325   Wound Length (cm) 0.9 cm 08/11/21 1325   Wound Width (cm) 6.4 cm 08/11/21 1325   Wound Depth (cm) 0.2 cm 08/11/21 1325   Wound Surface Area (cm^2) 5.76 cm^2 08/11/21 1325   Wound Volume (cm^3) 1.152 cm^3 08/11/21 1325   Wound Assessment Pink/red;Slough 08/11/21 1325   Drainage Amount Small 08/11/21 1325   Drainage Description Serosanguinous; Yellow 08/11/21 1325   Odor None 08/11/21 1325   Carmen-wound Assessment Blanchable erythema 08/11/21 1325   Number of days: 0     Incision 08/05/21 Radial Left (Active)   Dressing Status Clean;Dry; Intact 08/05/21 1300   Dressing/Treatment Surgical glue 08/05/21 1300   Closure Sutures 08/05/21 1127   Margins Approximated 08/05/21 1300   Number of days: 6       Procedure Note  Indications:  Based on my examination of this patient's wound(s)/ulcer(s) today, debridement is required to promote healing and evaluate the wound base. Performed by: Latosha Giron MD    Consent obtained:  Yes    Time out taken:  Yes    Pain Control: Anesthetic  Anesthetic: 4% Lidocaine Liquid Topical     Debridement:Excisional Debridement    Using curette the wound(s)/ulcer(s) was/were sharply debrided down through and including the removal of muscle fascia for the coccyx wound and subcutaneous tissue for the right hip wound. Devitalized Tissue Debrided:  biofilm, slough, necrotic/eschar and exudate to stimulate bleeding to promote healing, post debridement good bleeding base and wound edges noted    Wound/Ulcer #: 2    Percent of Wound/Ulcer Debrided: 100%    Total Surface Area Debrided:  38 sq cm   Post debridement measurments  1.coccyx: 32 cm2  2. right hip: 6cm2        Estimated Blood Loss:  Minimal  Hemostasis Achieved:  not needed    Procedural Pain:  5  / 10   Post Procedural Pain:  5 / 10     Response to treatment:  Well tolerated by patient. A culture was done.       Plan:     Pt is not a smoker      In my professional opinion and based off the information that is available at this time this patient has appropriate indication for HBO Therapy: No    Treatment Note please see attached Discharge Instructions    Written patient dismissal instructions given to patient and signed by patient or POA.            Electronically signed by Job Cannon MD on 8/11/2021 at 2:10 PM

## 2021-08-12 ENCOUNTER — HOSPITAL ENCOUNTER (OUTPATIENT)
Dept: GENERAL RADIOLOGY | Age: 71
Discharge: HOME | End: 2021-08-14
Payer: COMMERCIAL

## 2021-08-12 ENCOUNTER — APPOINTMENT (OUTPATIENT)
Dept: GENERAL RADIOLOGY | Age: 71
End: 2021-08-12
Payer: COMMERCIAL

## 2021-08-12 DIAGNOSIS — L98.8 SKIN LESION OF BREAST: ICD-10-CM

## 2021-08-12 DIAGNOSIS — N64.9 BREAST LESION: ICD-10-CM

## 2021-08-12 PROCEDURE — G0279 TOMOSYNTHESIS, MAMMO: HCPCS

## 2021-08-12 PROCEDURE — 76642 ULTRASOUND BREAST LIMITED: CPT

## 2021-08-14 LAB — ANAEROBIC CULTURE: NORMAL

## 2021-08-15 LAB
ORGANISM: ABNORMAL
WOUND/ABSCESS: ABNORMAL

## 2021-08-18 ENCOUNTER — HOSPITAL ENCOUNTER (OUTPATIENT)
Dept: WOUND CARE | Age: 71
Discharge: HOME | End: 2021-08-18

## 2021-10-12 ENCOUNTER — TELEPHONE (OUTPATIENT)
Dept: VASCULAR SURGERY | Age: 71
End: 2021-10-12

## 2021-10-12 NOTE — TELEPHONE ENCOUNTER
Tried calling to confirm appt for 10- with Dr Kofi Tello and no answer at [facility] nurses station.

## 2021-10-26 ENCOUNTER — TELEPHONE (OUTPATIENT)
Dept: VASCULAR SURGERY | Age: 71
End: 2021-10-26

## 2021-10-27 ENCOUNTER — OFFICE VISIT (OUTPATIENT)
Dept: VASCULAR SURGERY | Age: 71
End: 2021-10-27

## 2021-10-27 DIAGNOSIS — Z99.2 ENCOUNTER REGARDING VASCULAR ACCESS FOR DIALYSIS FOR END-STAGE RENAL DISEASE (HCC): Primary | ICD-10-CM

## 2021-10-27 DIAGNOSIS — N18.6 ENCOUNTER REGARDING VASCULAR ACCESS FOR DIALYSIS FOR END-STAGE RENAL DISEASE (HCC): Primary | ICD-10-CM

## 2021-10-27 PROCEDURE — 99024 POSTOP FOLLOW-UP VISIT: CPT | Performed by: NURSE PRACTITIONER

## 2021-10-27 PROCEDURE — G8417 CALC BMI ABV UP PARAM F/U: HCPCS | Performed by: NURSE PRACTITIONER

## 2021-10-27 PROCEDURE — G8400 PT W/DXA NO RESULTS DOC: HCPCS | Performed by: NURSE PRACTITIONER

## 2021-10-27 PROCEDURE — 1090F PRES/ABSN URINE INCON ASSESS: CPT | Performed by: NURSE PRACTITIONER

## 2021-10-27 PROCEDURE — 4040F PNEUMOC VAC/ADMIN/RCVD: CPT | Performed by: NURSE PRACTITIONER

## 2021-10-27 PROCEDURE — 3017F COLORECTAL CA SCREEN DOC REV: CPT | Performed by: NURSE PRACTITIONER

## 2021-10-27 PROCEDURE — 1036F TOBACCO NON-USER: CPT | Performed by: NURSE PRACTITIONER

## 2021-10-27 PROCEDURE — G8484 FLU IMMUNIZE NO ADMIN: HCPCS | Performed by: NURSE PRACTITIONER

## 2021-10-27 PROCEDURE — 1123F ACP DISCUSS/DSCN MKR DOCD: CPT | Performed by: NURSE PRACTITIONER

## 2021-10-27 PROCEDURE — G8427 DOCREV CUR MEDS BY ELIG CLIN: HCPCS | Performed by: NURSE PRACTITIONER

## 2021-10-28 NOTE — PROGRESS NOTES
10/28/2021    Jabier GRANGER [facility]  1950    Chief Complaint   Patient presents with    Circulatory Problem     Check (L) arm       Patient returns for post operative evaluation status post creation of a left AVF. The patient denies any unexpected problems since the procedure. She denies any left hand pain. Procedure Laterality Date    ABDOMEN SURGERY N/A 5/4/2020    SACRAL WOUND DEBRIDEMENT CALL DRWatson WITH TIME AVAIL AM performed by Madisyn Bennett MD at [address] [place] [address]  x3   [facility]  3/31/16    Laparoscopic-Dr. Cam Mendes    CYSTOSCOPY  2011     for kidney stones    DIALYSIS FISTULA CREATION Left 8/5/2021    INSERTION FISTULA LEFT ARM performed by Serina Diamond MD at [address] [place] [address]  2009     right     UPPER GASTROINTESTINAL ENDOSCOPY  2.18.15    Dr. Hernández [place] Findings: Mild Gastrits and Duodenitis, 2cm Hiatal Hernia    UPPER GASTROINTESTINAL ENDOSCOPY N/A 5/8/2020    EGD CONTROL HEMORRHAGE performed by Madisyn Bennett MD at [address] [facility] 5/22/2020    EGD BEDSIDE performed by Nico Green MD at [facility]. [place] [address] N/A 5/6/2021    INSERTION TUNNELED DIALYSIS CATHETER performed by Serina Diamond MD at [facility] OR       Physical Exam:  The incision(s) are healing without evidence of infection. Heart rhythm is regular. AVF with palpable thrill, but diminishes. Bedside ultrasound shows patent basilic and cephalic outflow. Cephalic vein is dominant outflow. Right      Left   Brachial     Radial     Femoral     Popliteal     Dorsalis Pedis     Posterior Tibial     (3=normal, 2=diminished, 1=barely palpable, 4=widened)    Assessment:  Post-operative AV access.     Problem List Items Addressed This Visit     Encounter regarding vascular access for dialysis for end-stage renal disease ([facility] - Primary        I reviewed with the patient that her left arm fistula is patent and both cephalic and basilic outflows are patent. The cephalic vein is the dominant outflow, but runs deep. At this point, I recommend fistula revision and elevation of the cephalic vein. The procedure including risks, benefits, and alternative options were discussed with the patient. She understands and wishes to proceed. I reviewed with the patient that normal activities can be resumed as tolerated. Pt seen and plan reviewed with Dr. Jarocho Encarnacion.      Boyd Tiwari, PABLO - CNP

## 2021-11-17 RX ORDER — IBUPROFEN 200 MG
TABLET ORAL
Status: ON HOLD | COMMUNITY
End: 2022-07-12 | Stop reason: HOSPADM

## 2021-11-17 RX ORDER — FERRIC CITRATE 210 MG/1
2 TABLET, COATED ORAL
Status: ON HOLD | COMMUNITY

## 2021-11-17 RX ORDER — MIDODRINE HYDROCHLORIDE 5 MG/1
15 TABLET ORAL
Status: ON HOLD | COMMUNITY

## 2021-11-17 RX ORDER — ONDANSETRON 4 MG/1
4 TABLET, FILM COATED ORAL EVERY 8 HOURS PRN
COMMUNITY
End: 2022-02-07

## 2021-11-18 ENCOUNTER — ANESTHESIA (OUTPATIENT)
Dept: OPERATING ROOM | Age: 71
End: 2021-11-18
Payer: COMMERCIAL

## 2021-11-18 ENCOUNTER — HOSPITAL ENCOUNTER (OUTPATIENT)
Age: 71
Setting detail: OUTPATIENT SURGERY
Discharge: HOME | End: 2021-11-18
Attending: SURGERY | Admitting: SURGERY
Payer: COMMERCIAL

## 2021-11-18 ENCOUNTER — ANESTHESIA EVENT (OUTPATIENT)
Dept: OPERATING ROOM | Age: 71
End: 2021-11-18
Payer: COMMERCIAL

## 2021-11-18 VITALS
HEART RATE: 63 BPM | WEIGHT: 191 LBS | RESPIRATION RATE: 16 BRPM | DIASTOLIC BLOOD PRESSURE: 73 MMHG | BODY MASS INDEX: 33.84 KG/M2 | SYSTOLIC BLOOD PRESSURE: 172 MMHG | HEIGHT: 63 IN | OXYGEN SATURATION: 95 % | TEMPERATURE: 96.8 F

## 2021-11-18 VITALS — SYSTOLIC BLOOD PRESSURE: 125 MMHG | DIASTOLIC BLOOD PRESSURE: 52 MMHG | OXYGEN SATURATION: 100 %

## 2021-11-18 LAB
ABO/RH: NORMAL
ANION GAP SERPL CALCULATED.3IONS-SCNC: 12 MMOL/L (ref 7–16)
ANTIBODY SCREEN: NORMAL
BUN BLDV-MCNC: 31 MG/DL (ref 6–23)
CALCIUM SERPL-MCNC: 9.3 MG/DL (ref 8.6–10.2)
CHLORIDE BLD-SCNC: 100 MMOL/L (ref 98–107)
CO2: 27 MMOL/L (ref 22–29)
CREAT SERPL-MCNC: 4.2 MG/DL (ref 0.5–1)
GFR AFRICAN AMERICAN: 13
GFR NON-AFRICAN AMERICAN: 10 ML/MIN/1.73
GLUCOSE BLD-MCNC: 112 MG/DL (ref 74–99)
HCT VFR BLD CALC: 32.6 % (ref 34–48)
HEMOGLOBIN: 10.2 G/DL (ref 11.5–15.5)
INR BLD: 1
MCH RBC QN AUTO: 31.9 PG (ref 26–35)
MCHC RBC AUTO-ENTMCNC: 31.3 % (ref 32–34.5)
MCV RBC AUTO: 101.9 FL (ref 80–99.9)
METER GLUCOSE: 138 MG/DL (ref 74–99)
METER GLUCOSE: 73 MG/DL (ref 74–99)
PDW BLD-RTO: 13.9 FL (ref 11.5–15)
PLATELET # BLD: 173 E9/L (ref 130–450)
PMV BLD AUTO: 9.9 FL (ref 7–12)
POTASSIUM REFLEX MAGNESIUM: 5 MMOL/L (ref 3.5–5)
PROTHROMBIN TIME: 10.4 SEC (ref 9.3–12.4)
RBC # BLD: 3.2 E12/L (ref 3.5–5.5)
SODIUM BLD-SCNC: 139 MMOL/L (ref 132–146)
WBC # BLD: 7.8 E9/L (ref 4.5–11.5)

## 2021-11-18 PROCEDURE — 2580000003 HC RX 258: Performed by: NURSE ANESTHETIST, CERTIFIED REGISTERED

## 2021-11-18 PROCEDURE — 82962 GLUCOSE BLOOD TEST: CPT

## 2021-11-18 PROCEDURE — 86850 RBC ANTIBODY SCREEN: CPT

## 2021-11-18 PROCEDURE — 36415 COLL VENOUS BLD VENIPUNCTURE: CPT

## 2021-11-18 PROCEDURE — 6360000002 HC RX W HCPCS: Performed by: ANESTHESIOLOGY

## 2021-11-18 PROCEDURE — 80048 BASIC METABOLIC PNL TOTAL CA: CPT

## 2021-11-18 PROCEDURE — 3700000001 HC ADD 15 MINUTES (ANESTHESIA): Performed by: SURGERY

## 2021-11-18 PROCEDURE — 7100000001 HC PACU RECOVERY - ADDTL 15 MIN: Performed by: SURGERY

## 2021-11-18 PROCEDURE — 64415 NJX AA&/STRD BRCH PLXS IMG: CPT

## 2021-11-18 PROCEDURE — 3600000003 HC SURGERY LEVEL 3 BASE: Performed by: SURGERY

## 2021-11-18 PROCEDURE — 6360000002 HC RX W HCPCS: Performed by: SURGERY

## 2021-11-18 PROCEDURE — 7100000000 HC PACU RECOVERY - FIRST 15 MIN: Performed by: SURGERY

## 2021-11-18 PROCEDURE — 86900 BLOOD TYPING SEROLOGIC ABO: CPT

## 2021-11-18 PROCEDURE — 85027 COMPLETE CBC AUTOMATED: CPT

## 2021-11-18 PROCEDURE — 3600000013 HC SURGERY LEVEL 3 ADDTL 15MIN: Performed by: SURGERY

## 2021-11-18 PROCEDURE — 7100000011 HC PHASE II RECOVERY - ADDTL 15 MIN: Performed by: SURGERY

## 2021-11-18 PROCEDURE — 2500000003 HC RX 250 WO HCPCS: Performed by: NURSE ANESTHETIST, CERTIFIED REGISTERED

## 2021-11-18 PROCEDURE — 3700000000 HC ANESTHESIA ATTENDED CARE: Performed by: SURGERY

## 2021-11-18 PROCEDURE — 2709999900 HC NON-CHARGEABLE SUPPLY: Performed by: SURGERY

## 2021-11-18 PROCEDURE — 6360000002 HC RX W HCPCS: Performed by: PHYSICIAN ASSISTANT

## 2021-11-18 PROCEDURE — 2500000003 HC RX 250 WO HCPCS: Performed by: SURGERY

## 2021-11-18 PROCEDURE — 36832 AV FISTULA REVISION OPEN: CPT | Performed by: SURGERY

## 2021-11-18 PROCEDURE — 64415 NJX AA&/STRD BRCH PLXS IMG: CPT | Performed by: ANESTHESIOLOGY

## 2021-11-18 PROCEDURE — 85610 PROTHROMBIN TIME: CPT

## 2021-11-18 PROCEDURE — 7100000010 HC PHASE II RECOVERY - FIRST 15 MIN: Performed by: SURGERY

## 2021-11-18 PROCEDURE — 86901 BLOOD TYPING SEROLOGIC RH(D): CPT

## 2021-11-18 PROCEDURE — 6360000002 HC RX W HCPCS: Performed by: NURSE ANESTHETIST, CERTIFIED REGISTERED

## 2021-11-18 RX ORDER — SODIUM CHLORIDE 0.9 % (FLUSH) 0.9 %
5-40 SYRINGE (ML) INJECTION PRN
Status: DISCONTINUED | OUTPATIENT
Start: 2021-11-18 | End: 2021-11-18 | Stop reason: HOSPADM

## 2021-11-18 RX ORDER — SODIUM CHLORIDE 9 MG/ML
INJECTION, SOLUTION INTRAVENOUS CONTINUOUS
Status: DISCONTINUED | OUTPATIENT
Start: 2021-11-18 | End: 2021-11-18 | Stop reason: HOSPADM

## 2021-11-18 RX ORDER — PROPOFOL 10 MG/ML
INJECTION, EMULSION INTRAVENOUS CONTINUOUS PRN
Status: DISCONTINUED | OUTPATIENT
Start: 2021-11-18 | End: 2021-11-18 | Stop reason: SDUPTHER

## 2021-11-18 RX ORDER — MIDAZOLAM HYDROCHLORIDE 1 MG/ML
INJECTION INTRAMUSCULAR; INTRAVENOUS PRN
Status: DISCONTINUED | OUTPATIENT
Start: 2021-11-18 | End: 2021-11-18 | Stop reason: SDUPTHER

## 2021-11-18 RX ORDER — SODIUM CHLORIDE 9 MG/ML
25 INJECTION, SOLUTION INTRAVENOUS PRN
Status: DISCONTINUED | OUTPATIENT
Start: 2021-11-18 | End: 2021-11-18 | Stop reason: HOSPADM

## 2021-11-18 RX ORDER — FENTANYL CITRATE 50 UG/ML
50 INJECTION, SOLUTION INTRAMUSCULAR; INTRAVENOUS ONCE
Status: COMPLETED | OUTPATIENT
Start: 2021-11-18 | End: 2021-11-18

## 2021-11-18 RX ORDER — SODIUM CHLORIDE 0.9 % (FLUSH) 0.9 %
5-40 SYRINGE (ML) INJECTION EVERY 12 HOURS SCHEDULED
Status: DISCONTINUED | OUTPATIENT
Start: 2021-11-18 | End: 2021-11-18 | Stop reason: HOSPADM

## 2021-11-18 RX ORDER — FENTANYL CITRATE 50 UG/ML
INJECTION, SOLUTION INTRAMUSCULAR; INTRAVENOUS PRN
Status: DISCONTINUED | OUTPATIENT
Start: 2021-11-18 | End: 2021-11-18 | Stop reason: SDUPTHER

## 2021-11-18 RX ORDER — LIDOCAINE HYDROCHLORIDE 10 MG/ML
5 INJECTION, SOLUTION EPIDURAL; INFILTRATION; INTRACAUDAL; PERINEURAL ONCE
Status: DISCONTINUED | OUTPATIENT
Start: 2021-11-18 | End: 2021-11-18 | Stop reason: HOSPADM

## 2021-11-18 RX ORDER — ROPIVACAINE HYDROCHLORIDE 5 MG/ML
INJECTION, SOLUTION EPIDURAL; INFILTRATION; PERINEURAL
Status: COMPLETED | OUTPATIENT
Start: 2021-11-18 | End: 2021-11-18

## 2021-11-18 RX ORDER — ROPIVACAINE HYDROCHLORIDE 5 MG/ML
30 INJECTION, SOLUTION EPIDURAL; INFILTRATION; PERINEURAL ONCE
Status: COMPLETED | OUTPATIENT
Start: 2021-11-18 | End: 2021-11-18

## 2021-11-18 RX ORDER — MIDAZOLAM HYDROCHLORIDE 2 MG/2ML
1 INJECTION, SOLUTION INTRAMUSCULAR; INTRAVENOUS EVERY 5 MIN PRN
Status: DISCONTINUED | OUTPATIENT
Start: 2021-11-18 | End: 2021-11-18 | Stop reason: HOSPADM

## 2021-11-18 RX ORDER — PHENYLEPHRINE HCL IN 0.9% NACL 1 MG/10 ML
SYRINGE (ML) INTRAVENOUS PRN
Status: DISCONTINUED | OUTPATIENT
Start: 2021-11-18 | End: 2021-11-18 | Stop reason: SDUPTHER

## 2021-11-18 RX ORDER — SODIUM CHLORIDE 9 MG/ML
INJECTION, SOLUTION INTRAVENOUS CONTINUOUS PRN
Status: DISCONTINUED | OUTPATIENT
Start: 2021-11-18 | End: 2021-11-18 | Stop reason: SDUPTHER

## 2021-11-18 RX ADMIN — SODIUM CHLORIDE: 9 INJECTION, SOLUTION INTRAVENOUS at 11:35

## 2021-11-18 RX ADMIN — PROPOFOL 70 MCG/KG/MIN: 10 INJECTION, EMULSION INTRAVENOUS at 11:43

## 2021-11-18 RX ADMIN — FENTANYL CITRATE 50 MCG: 0.05 INJECTION, SOLUTION INTRAMUSCULAR; INTRAVENOUS at 09:40

## 2021-11-18 RX ADMIN — Medication 100 MCG: at 12:09

## 2021-11-18 RX ADMIN — FENTANYL CITRATE 25 MCG: 50 INJECTION, SOLUTION INTRAMUSCULAR; INTRAVENOUS at 12:14

## 2021-11-18 RX ADMIN — Medication 2000 MG: at 11:35

## 2021-11-18 RX ADMIN — MIDAZOLAM HYDROCHLORIDE 1 MG: 1 INJECTION, SOLUTION INTRAMUSCULAR; INTRAVENOUS at 09:40

## 2021-11-18 RX ADMIN — ROPIVACAINE HYDROCHLORIDE 30 ML: 5 INJECTION, SOLUTION EPIDURAL; INFILTRATION; PERINEURAL at 09:51

## 2021-11-18 RX ADMIN — FENTANYL CITRATE 25 MCG: 50 INJECTION, SOLUTION INTRAMUSCULAR; INTRAVENOUS at 11:43

## 2021-11-18 RX ADMIN — Medication 50 MCG: at 12:24

## 2021-11-18 RX ADMIN — MIDAZOLAM 1 MG: 1 INJECTION INTRAMUSCULAR; INTRAVENOUS at 11:35

## 2021-11-18 ASSESSMENT — PULMONARY FUNCTION TESTS
PIF_VALUE: 0
PIF_VALUE: 1
PIF_VALUE: 0
PIF_VALUE: 1
PIF_VALUE: 1
PIF_VALUE: 0
PIF_VALUE: 1
PIF_VALUE: 0
PIF_VALUE: 1
PIF_VALUE: 1
PIF_VALUE: 0
PIF_VALUE: 0
PIF_VALUE: 1
PIF_VALUE: 0

## 2021-11-18 ASSESSMENT — PAIN SCALES - GENERAL
PAINLEVEL_OUTOF10: 0

## 2021-11-18 ASSESSMENT — ENCOUNTER SYMPTOMS: SHORTNESS OF BREATH: 0

## 2021-11-18 ASSESSMENT — PAIN - FUNCTIONAL ASSESSMENT: PAIN_FUNCTIONAL_ASSESSMENT: 0-10

## 2021-11-18 NOTE — H&P
Vascular Surgery History & Physical Exam      Chief Complaint: CRF    HISTORY OF PRESENT ILLNESS:                The patient is a 66 y.o. female who presents to the hospital for elective creation of a arteriovenous fistula. The patient denies any problems since the last office visit. IMPRESSION:   Active Hospital Problems    Diagnosis     Encounter regarding vascular access for dialysis for end-stage renal disease [facility] [N18.6, Z99.2]        PLAN:  Creation of a left  arteriovenous revision. Risk benefits and alternatives were discussed with the patient including but not limited to bleeding, infection, arterial venous nerve injury, arterial steal, venous hypertension, sensorimotor disturbance or loss, maintenance of the access, need for additional access, loss of the access, if an AV graft needs to be used risk of infection and/or removal, distal embolization, wound complications, limb loss and/or death the patient understands and wishes to proceed.       Patient Active Problem List   Diagnosis Code    Neurologic gait dysfunction R26.9    Renal failure, acute on chronic (HCC) N17.9, N18.9    Diabetes mellitus ([facility] E11.9    Hypertension I10    Arthritis M19.90    Kidney disease N28.9    Acute blood loss anemia D62    Knee problem M25.9    Anemia due to stage 3 chronic kidney disease N18.30, D63.1    Primary osteoarthritis of both knees M17.0    Acute on chronic renal insufficiency N28.9, N18.9    Acute kidney injury superimposed on CKD (HCC) N17.9, V33.9    Metabolic acidosis J41.8    Acute renal failure superimposed on chronic kidney disease, on chronic dialysis (HCC) N17.9, N18.9, Z99.2    Sepsis ([facility] A41.9    2019 novel coronavirus disease (COVID-19) U07.1    GI bleed K92.2    Moderate protein-calorie malnutrition ([facility]) E44.0    Hyperkalemia E87.5    PERLA (acute kidney injury) ([facility] N17.9    Wound of left breast S21.002A    End stage renal disease (HCC) N18.6    Encounter regarding vascular access for dialysis for end-stage renal disease ([facility] N18.6, Z99.2    Pressure injury of sacral region, stage 4 (HCC) L89.154    Pressure injury of right hip, stage 3 (HCC) J97.313       Past Medical History:   Diagnosis Date    Anxiety and depression     Arthritis     COVID-19 05/2020    Diabetes mellitus ([facility]     Dysphagia, oral phase     Hemodialysis patient ([facility]     M-W-F    Hypertension     Hypertensive kidney disease with stage 4 chronic kidney disease ([facility]     Kidney stones     Obesity     Sacral pressure ulcer 08/03/2021    stage 4    Unspecified osteoarthritis, unspecified site         Past Surgical History:   Procedure Laterality Date    ABDOMEN SURGERY N/A 5/4/2020    SACRAL WOUND DEBRIDEMENT CALL   WITH TIME AVAIL AM performed by Kevin Downing MD at [address] [place]  x3   [address]  3/31/16    Laparoscopic-Dr. Riley Martini    CYSTOSCOPY  2011     for kidney stones    DIALYSIS FISTULA CREATION Left 8/5/2021    INSERTION FISTULA LEFT ARM performed by Dasha Koo MD at [address]  2009     right     UPPER GASTROINTESTINAL ENDOSCOPY  2.18.15    Dr. Racheal Black Findings: Mild Gastrits and Duodenitis, 2cm Hiatal Hernia    UPPER GASTROINTESTINAL ENDOSCOPY N/A 5/8/2020    EGD CONTROL HEMORRHAGE performed by Kevin Downing MD at [address] 5/22/2020    EGD BEDSIDE performed by Emeka Rodriguez MD at [address] N/A 5/6/2021    INSERTION TUNNELED DIALYSIS CATHETER performed by Dasha Koo MD at [facility] OR       Current Medications:     Current Facility-Administered Medications:     0.9 % sodium chloride infusion, , IntraVENous, Continuous, Natalie Gray PA-C    sodium chloride flush 0.9 % injection 5-40 mL, 5-40 mL, IntraVENous, 2 times per day, Natalie Gray PA-C    sodium chloride flush 0.9 % injection 5-40 mL, 5-40 mL, IntraVENous, PRN, Yazan Arevalo, [place]    0.9 % sodium chloride infusion, 25 mL, IntraVENous, PRN, Natalie Gray PA-C    ceFAZolin (ANCEF) 2000 mg in sterile water 20 mL IV syringe, 2,000 mg, IntraVENous, On Call to OR, Natalie Gray PA-C    lidocaine PF 1 % injection 5 mL, 5 mL, SubCUTAneous, Once, Yusra Murguia MD    midazolam PF (VERSED) injection 1 mg, 1 mg, IntraVENous, Q5 Min PRN, Yusra Murguia MD, 1 mg at 11/18/21 0940    Allergies:  Patient has no known allergies. Social History     Socioeconomic History    Marital status:      Spouse name: Not on file    Number of children: Not on file    Years of education: Not on file    Highest education level: Not on file   Occupational History    Not on file   Tobacco Use    Smoking status: Former Smoker     Packs/day: 1.00     Years: 30.00     Pack years: 30.00     Quit date: 8/13/2011     Years since quitting: 10.2    Smokeless tobacco: Never Used   Vaping Use    Vaping Use: Never used   Substance and Sexual Activity    Alcohol use: No    Drug use: No    Sexual activity: Not on file   Other Topics Concern    Not on file   Social History Narrative    Not on file     Social Determinants of Health     Financial Resource Strain:     Difficulty of Paying Living Expenses: Not on file   Food Insecurity:     Worried About Running Out of Food in the Last Year: Not on file    Demetrice of Food in the Last Year: Not on file   Transportation Needs:     Lack of Transportation (Medical): Not on file    Lack of Transportation (Non-Medical):  Not on file   Physical Activity:     Days of Exercise per Week: Not on file    Minutes of Exercise per Session: Not on file   Stress:     Feeling of Stress : Not on file   Social Connections:     Frequency of Communication with Friends and Family: Not on file    Frequency of Social Gatherings with Friends and Family: Not on file    Attends Yazdanism Services: Not on file    Active Member of Clubs or Organizations: Not on file    Attends Club or Organization Meetings: Not on file    Marital Status: Not on file   Intimate Partner Violence:     Fear of Current or Ex-Partner: Not on file    Emotionally Abused: Not on file    Physically Abused: Not on file    Sexually Abused: Not on file   Housing Stability:     Unable to Pay for Housing in the Last Year: Not on file    Number of Places Lived in the Last Year: Not on file    Unstable Housing in the Last Year: Not on file        Family History   Problem Relation Age of Onset    Cancer Sister        REVIEW OF SYSTEMS:  The chart was reviewed.     PHYSICAL EXAM:    Vitals:    11/18/21 0951   BP: 117/69   Pulse: 68   Resp: 18   Temp:    SpO2: 100%     CONSTITUTIONAL:  awake, alert, cooperative, no apparent distress, and appears stated age  NECK:  Supple, symmetrical, trachea midline, no adenopathy, thyroid symmetric, not enlarged and no tenderness, skin normal  LUNGS:  no increased work of breathing, good air exchange and clear to auscultation  CARDIOVASCULAR:  regular rate and rhythm and pulses 2 plus all extermities bilaterally  ABDOMEN:  soft, non-distended and non-tender    LABS:    Lab Results   Component Value Date    WBC 7.8 11/18/2021    HGB 10.2 (L) 11/18/2021    HCT 32.6 (L) 11/18/2021     11/18/2021    PROTIME 10.4 11/18/2021    INR 1.0 11/18/2021    APTT 24.6 05/21/2020    K 5.0 11/18/2021    BUN 31 (H) 11/18/2021    CREATININE 4.2 (H) 11/18/2021       RADIOLOGY:

## 2021-11-18 NOTE — ANESTHESIA POSTPROCEDURE EVALUATION
Department of Anesthesiology  Postprocedure Note    Patient: Cam Herr  MRN: [id number]  YOB: 1950  Date of evaluation: 11/18/2021  Time:  5:49 PM     Procedure Summary     Date: 11/18/21 Room / Location: [address]    Anesthesia Start: 1129 Anesthesia Stop: 0749    Procedure: REVISION AV FISTULA LEFT ARM (Right Arm Upper) Diagnosis: (CHRONIC RENAL FAILURE)    Surgeons: Anna Soulier, MD Responsible Provider: Chema Buckley MD    Anesthesia Type: MAC, regional ASA Status: 4          Anesthesia Type: MAC, regional    Nuzhat Phase I: Nuzhat Score: 10    Nuzhat Phase II: Nuzhat Score: 10    Last vitals: Reviewed and per EMR flowsheets.        Anesthesia Post Evaluation    Patient location during evaluation: PACU  Patient participation: complete - patient participated  Level of consciousness: awake and alert  Airway patency: patent  Nausea & Vomiting: no nausea and no vomiting  Complications: no  Cardiovascular status: hemodynamically stable  Respiratory status: acceptable  Hydration status: stable  Multimodal analgesia pain management approach

## 2021-11-18 NOTE — OP NOTE
Operative Note      Patient: Lima Chung  YOB: 1950  MRN: [id number]    DATE OF PROCEDURE: 11/18/2021     SURGEON: Chayito Cutler M.D.     ASSISTANT: Siria Rivera PA-C     PREOPERATIVE DIAGNOSIS: Chronic renal failure, Stage 5 with a deep left arm arteriovenous fistula, Malfunction of dialysis access. POSTOPERATIVE DIAGNOSIS: Same    OPERATION: Revision of left brachiocephalic arteriovenous fistula without thrombectomy     ANESTHESIA: Regional, local and LMAC     ESTIMATED BLOOD LOSS: Minimal     COMPLICATIONS: None    DESCRIPTION OF PROCEDURE: The patient was identified and the procedure was confirmed. The left arm was prepped and draped in the usual sterile fashion. Lidocaine 1% mixed with 0.25% Marcaine was used for local anesthesia. A transverse incision was made at the level of the anastomosis. The outflow basilic vein was ligated to direct flow into the nicely developed brachiocephalic fistula then A longitudinal skin incision was made in the upper arm over the cephalic vein fistula and carried down through the subcutaneous tissue. The cephalic vein was identified and dissected free from the surrounding tissues and branches were divided between silk ties. Medially and laterally, the subcutaneous tissue was incised and approximated under the vein to elevate it using a running Vicryl suture. Excess adipose tissue was excised under the skin. Hemostasis was obtained and the incision was irrigated with saline solution. The incision was approximated with Vicryl sutures, and 4-0 Monocryl and skin adhesive was applied over the incision in the operating room. Needle, sponge and instrument counts were reported as correct x2. The patient tolerated the procedure and was transferred to the recovery area in satisfactory condition.      Drains:   External Urinary Catheter (Active)           Electronically signed by Lawyer Addie MD on 11/18/2021 at 12:18 PM

## 2021-11-18 NOTE — ANESTHESIA PRE PROCEDURE
Department of Anesthesiology  Preprocedure Note       Name:  Aaron Bloom   Age:  66 y.o.  :  1950                                          MRN:  [id number]         Date:  2021      Surgeon: Irene Brower):  John Bustos MD    Procedure: Procedure(s):  REVISION AV FISTULA LEFT ARM   ++PNB++    Medications prior to admission:   Prior to Admission medications    Medication Sig Start Date End Date Taking? Authorizing Provider   Ferric Citrate (AURYXIA) 1  MG(Fe) TABS Take 2 tablets by mouth Take with meals. Historical Provider, MD   glucagon, rDNA, 1 MG injection once    Historical Provider, MD   linaCLOtide (LINZESS) 72 MCG CAPS capsule Take 72 mcg by mouth every morning (before breakfast)    Historical Provider, MD   midodrine (PROAMATINE) 5 MG tablet Take 5 mg by mouth three times a week --    Historical Provider, MD   neomycin-bacitracin-polymyxin (NEOSPORIN) 5-400-5000 ointment Apply topically 4 times daily Apply topically 4 times daily.     Historical Provider, MD   ondansetron (ZOFRAN) 4 MG tablet Take 4 mg by mouth every 8 hours as needed for Nausea or Vomiting    Historical Provider, MD   bisacodyl (DULCOLAX) 5 MG EC tablet Take 10 mg by mouth daily as needed for Constipation    Historical Provider, MD   docusate sodium (COLACE) 100 MG capsule Take 100 mg by mouth 2 times daily    Historical Provider, MD   glucagon 1 MG injection Inject 1 kit into the muscle as needed (HYPOGLYCEMIA)    Historical Provider, MD   insulin lispro (HUMALOG) 100 UNIT/ML injection vial Inject 10 Units into the skin 3 times daily (before meals)    Historical Provider, MD   Vitamin D (CHOLECALCIFEROL) 50 MCG ( UT) TABS tablet Take 1 tablet by mouth daily 20   Federica Ramey MD   SITagliptin (JANUVIA) 25 MG tablet Take 25 mg by mouth daily    Historical Provider, MD   magnesium hydroxide (MILK OF MAGNESIA) 400 MG/5ML suspension Take 30 mLs by mouth daily as needed for Constipation     Historical Provider, MD   metoprolol succinate (TOPROL XL) 25 MG extended release tablet Take 1 tablet by mouth daily 10/12/20   Joy Stone MD   escitalopram (LEXAPRO) 5 MG tablet Take 10 mg by mouth daily    Historical Provider, MD   gabapentin (NEURONTIN) 300 MG capsule Take 300 mg by mouth 3 times daily. Historical Provider, MD   insulin glargine (BASAGLAR KWIKPEN) 100 UNIT/ML injection pen Inject 10 Units into the skin nightly    Historical Provider, MD   busPIRone (BUSPAR) 5 MG tablet Take 5 mg by mouth 3 times daily     Historical Provider, MD   oxyCODONE-acetaminophen (PERCOCET) 7.5-325 MG per tablet Take 1 tablet by mouth every 8 hours as needed for Pain. Historical Provider, MD   polyethylene glycol (GLYCOLAX) 17 g packet Take 17 g by mouth daily    Historical Provider, MD   senna (SENOKOT) 8.6 MG tablet Take 2 tablets by mouth 2 times daily     Historical Provider, MD   acetaminophen (TYLENOL) 325 MG tablet Take 650 mg by mouth every 6 hours as needed for Pain or Fever     Historical Provider, MD       Current medications:    No current facility-administered medications for this visit. No current outpatient medications on file.      Facility-Administered Medications Ordered in Other Visits   Medication Dose Route Frequency Provider Last Rate Last Admin    0.9 % sodium chloride infusion   IntraVENous Continuous Natalie Gray PA-C        sodium chloride flush 0.9 % injection 5-40 mL  5-40 mL IntraVENous 2 times per day Natalie Gray PA-C        sodium chloride flush 0.9 % injection 5-40 mL  5-40 mL IntraVENous PRN Natalie Gray PA-C        0.9 % sodium chloride infusion  25 mL IntraVENous PRN Natalie Gray PA-C        ceFAZolin (ANCEF) 2000 mg in sterile water 20 mL IV syringe  2,000 mg IntraVENous On Call to [facility] [place]           Allergies:  No Known Allergies    Problem List:    Patient Active Problem List   Diagnosis Code    Neurologic gait dysfunction R26.9    Renal failure, acute on chronic (HCC) N17.9, N18.9    Diabetes mellitus ([address] E11.9    Hypertension I10    Arthritis M19.90    Kidney disease N28.9    Acute blood loss anemia D62    Knee problem M25.9    Anemia due to stage 3 chronic kidney disease N18.30, D63.1    Primary osteoarthritis of both knees M17.0    Acute on chronic renal insufficiency N28.9, N18.9    Acute kidney injury superimposed on CKD (HCC) N17.9, C88.9    Metabolic acidosis J59.4    Acute renal failure superimposed on chronic kidney disease, on chronic dialysis (HCC) N17.9, N18.9, Z99.2    Sepsis ([address] A41.9    2019 novel coronavirus disease (COVID-19) U07.1    GI bleed K92.2    Moderate protein-calorie malnutrition ([facility]) E44.0    Hyperkalemia E87.5    PERLA (acute kidney injury) ([facility]) N17.9    Wound of left breast S21.002A    End stage renal disease (HCC) N18.6    Encounter regarding vascular access for dialysis for end-stage renal disease ([facility]) N18.6, Z99.2    Pressure injury of sacral region, stage 4 ([facility]) L89.154    Pressure injury of right hip, stage 3 ([address] B10.917       Past Medical History:        Diagnosis Date    Anxiety and depression     Arthritis     COVID-19 05/2020    Diabetes mellitus ([address]     Dysphagia, oral phase     Hemodialysis patient ([address]     M-W-F    Hypertension     Hypertensive kidney disease with stage 4 chronic kidney disease ([address]     Kidney stones     Obesity     Sacral pressure ulcer 08/03/2021    stage 4    Unspecified osteoarthritis, unspecified site        Past Surgical History:        Procedure Laterality Date    ABDOMEN SURGERY N/A 5/4/2020    SACRAL WOUND DEBRIDEMENT CALL   WITH TIME AVAIL AM performed by Yonathan Hudson MD at [address] [place] [address]    CHOLECYSTECTOMY  3/31/16    Laparoscopic-Dr. Hannah Francois    CYSTOSCOPY  2011     for kidney stones    DIALYSIS FISTULA CREATION Left 8/5/2021    INSERTION FISTULA LEFT ARM performed by Yared Morrell MD at [place] [address] FOOT SURGERY  2009     right     UPPER GASTROINTESTINAL ENDOSCOPY  2.18.15    Dr. Chino Ramirez Findings: Mild Gastrits and Duodenitis, 2cm Hiatal Hernia    UPPER GASTROINTESTINAL ENDOSCOPY N/A 5/8/2020    EGD CONTROL HEMORRHAGE performed by Manjit Robles MD at [facility] 5/22/2020    EGD BEDSIDE performed by Vera Rondon MD at [facility]. [address] N/A 5/6/2021    INSERTION TUNNELED DIALYSIS CATHETER performed by Los Shanks MD at [facility] History:    Social History     Tobacco Use    Smoking status: Former Smoker     Packs/day: 1.00     Years: 30.00     Pack years: 30.00     Quit date: 8/13/2011     Years since quitting: 10.2    Smokeless tobacco: Never Used   Substance Use Topics    Alcohol use: No                                Counseling given: Not Answered      Vital Signs (Current): There were no vitals filed for this visit.                                            BP Readings from Last 3 Encounters:   08/11/21 116/70   08/05/21 (!) 129/58   08/05/21 (!) 122/48       NPO Status:                                                                                 BMI:   Wt Readings from Last 3 Encounters:   08/03/21 192 lb 9.6 oz (87.4 kg)   05/07/21 195 lb 5.2 oz (88.6 kg)   04/28/21 185 lb (83.9 kg)     There is no height or weight on file to calculate BMI.    CBC:   Lab Results   Component Value Date    WBC 7.6 08/05/2021    RBC 2.60 08/05/2021    HGB 8.4 08/05/2021    HCT 27.6 08/05/2021    .2 08/05/2021    RDW 14.3 08/05/2021     08/05/2021       CMP:   Lab Results   Component Value Date     08/05/2021    K 5.0 11/10/2021    K 4.3 08/05/2021    CL 99 08/05/2021    CO2 32 08/05/2021    BUN 18 08/05/2021    CREATININE 3.6 08/05/2021    GFRAA 15 08/05/2021    LABGLOM 12 08/05/2021    GLUCOSE 96 08/05/2021    GLUCOSE 149 10/12/2011    PROT 6.6 05/04/2021    CALCIUM 8.9 08/05/2021    BILITOT 0.2 05/04/2021 ALKPHOS 171 05/04/2021    AST 17 05/04/2021    ALT 11 05/04/2021       POC Tests: No results for input(s): POCGLU, POCNA, POCK, POCCL, POCBUN, POCHEMO, POCHCT in the last 72 hours. Coags:   Lab Results   Component Value Date    PROTIME 11.5 08/05/2021    PROTIME 15.3 06/25/2011    INR 1.1 08/05/2021    APTT 24.6 05/21/2020       HCG (If Applicable): No results found for: PREGTESTUR, PREGSERUM, HCG, HCGQUANT     ABGs:   Lab Results   Component Value Date    PO2ART 110.4 05/09/2020    FEV7FIG 43.7 05/09/2020    NXW3ICY 21.8 05/09/2020    Q9IIATIA 97.4 06/27/2011        Type & Screen (If Applicable):  No results found for: LABABO, LABRH    Drug/Infectious Status (If Applicable):  No results found for: HIV, HEPCAB    COVID-19 Screening (If Applicable):   Lab Results   Component Value Date    COVID19 Not Detected 08/05/2021    COVID19 Not Detected 05/21/2020           Anesthesia Evaluation  Patient summary reviewed and Nursing notes reviewed no history of anesthetic complications:   Airway: Mallampati: II  TM distance: >3 FB   Neck ROM: full  Mouth opening: > = 3 FB Dental:      Comment: Pt states no loose teeth    Pulmonary:       (-) shortness of breath and no decreased breath sounds                          ROS comment: Quit smoking 10 years ago    Probable ALFRED - never had sleep study   Cardiovascular:    (+) hypertension: moderate,     (-)  NAVARRO    ECG reviewed  Rhythm: regular  Rate: normal  Echocardiogram reviewed                  Neuro/Psych:   (+) psychiatric history:depression/anxiety             GI/Hepatic/Renal:   (+) renal disease (on HD (M/W/F) for past couple of months;  done yesterday): ESRD and dialysis,          ROS comment: S/p TDC insertion. Endo/Other:    (+) DiabetesType II DM, , : arthritis:., .                  ROS comment: Neurologic gait dysfunction Abdominal:   (+) obese,           Vascular:           ROS comment: Preexisting TDC in place currently.  Other Findings:               Anesthesia Plan      MAC and regional     ASA 4     (Patient agreed to suspend her DNR status during the perioperative period. Agrees to preop nerve block as requested by surgeon.)  Induction: intravenous. MIPS: Postoperative opioids intended and Prophylactic antiemetics administered. Anesthetic plan and risks discussed with patient. Use of blood products discussed with patient whom consented to blood products. Plan discussed with CRNA.                   Kaylie Melendez MD   11/18/2021

## 2021-11-18 NOTE — PROGRESS NOTES
Left interscalene brachial plexus block completed per Dr Nestor Denton with good toleration. Call light within reach.
Nisreen at Dr. Avram Osgood office notified that patient is a DNR-CC.
No family present. Nourishment provided. Call light within reach.
Patient is a resident of [place]. PAT telephone assessment completed with Yoselin Holliday LPN. Patient is alert and oriented, signs for herself. She is a DNR-CC (document in Epic). Patient uses and wheelchair and a roger lift. She is not in isolation and is not on oxygen. On dialysis M-W-F. Access is a right chest tesio. She has a wound to her right lower back and a wound to her sacrum. Both are covered with a dry soft dressing. She is able to use a call light without difficulty. She will be transported by [facility] ambulance as arranged by [place]. Instructions reviewed and faxed to [place].
Patient is fully vaccinated against Covid-19. Vaccine documented in Epic. No pre-op testing needed.
Report to Etienne Meyers at [facility].
apple juice only    [] Bring inhalers day of surgery    [] Bring C-PAP/ Bi-Pap day of surgery    [] Bring urine specimen day of surgery    [x] Shower or bath with soap, lather and rinse well, AM of Surgery, no lotion, powders or creams to surgical site    [] Follow bowel prep as instructed per surgeon    [x] No tobacco products within 24 hours of surgery     [x] No alcohol or illegal drug use within 24 hours of surgery.     [x] Jewelry, body piercing's, eyeglasses, contact lenses and dentures are not permitted into surgery (bring cases)      [x] Please do not wear any nail polish, make up or hair products on the day of surgery    [x] You can expect a call the business day prior to procedure to notify you if your arrival time changes    [] If you receive a survey after surgery we would greatly appreciate your comments    [] Parent/guardian of a minor must accompany their child and remain on the premises  the entire time they are under our care     [] Pediatric patients may bring favorite toy, blanket or comfort item with them    [x] A caregiver or family member must remain with the patient during their stay if they are mentally handicapped, have dementia, disoriented or unable to use a call light or would be a safety concern if left unattended    [x] Please notify surgeon if you develop any illness between now and time of surgery (cold, cough, sore throat, fever, nausea, vomiting) or any signs of infections  including skin, wounds, and dental.    []  The Outpatient Pharmacy is available to fill your prescription here on your day of surgery, ask your preop nurse for details    [] Other instructions    EDUCATIONAL MATERIALS PROVIDED:    [] PAT Preoperative Education Packet/Booklet     [] Medication List    [] Transfusion bracelet applied with instructions    [] Shower with soap, lather and rinse well, and use CHG wipes provided the evening before surgery as instructed    [] Incentive spirometer with instructions

## 2021-11-18 NOTE — ANESTHESIA PROCEDURE NOTES
Peripheral Block    Patient location during procedure: pre-op  Staffing  Performed: anesthesiologist   Anesthesiologist: James Damon MD  Other anesthesia staff: Yusef Sampson RN  Preanesthetic Checklist  Completed: patient identified, IV checked, site marked, risks and benefits discussed, surgical consent, monitors and equipment checked, pre-op evaluation, timeout performed, anesthesia consent given, oxygen available and patient being monitored  Peripheral Block  Patient position: supine  Prep: ChloraPrep  Patient monitoring: cardiac monitor, continuous pulse ox, frequent blood pressure checks and IV access  Block type: Brachial plexus  Laterality: left  Injection technique: single-shot  Guidance: nerve stimulator and ultrasound guided  Local infiltration: lidocaine  Infiltration strength: 1 %  Dose: 1 mL  Supraclavicular  Provider prep: mask and sterile gloves  Local infiltration: lidocaine  Needle  Needle type: combined needle/nerve stimulator   Needle gauge: 20 G  Needle length: 10 cm  Needle localization: ultrasound guidance and nerve stimulator  Assessment  Injection assessment: negative aspiration for heme, no paresthesia on injection and local visualized surrounding nerve on ultrasound  Paresthesia pain: none  Slow fractionated injection: yes  Hemodynamics: stable  Additional Notes  Time out performed. Local anesthetic injected easily in incremental doses and without paresthesia.   Well tolerated  Medications Administered  Ropivacaine (NAROPIN) 0.5% injection, 30 mL  Reason for block: post-op pain management, primary anesthetic and at surgeon's request

## 2021-11-30 ENCOUNTER — TELEPHONE (OUTPATIENT)
Dept: VASCULAR SURGERY | Age: 71
End: 2021-11-30

## 2021-12-01 ENCOUNTER — OFFICE VISIT (OUTPATIENT)
Dept: VASCULAR SURGERY | Age: 71
End: 2021-12-01

## 2021-12-01 VITALS — HEIGHT: 61 IN | BODY MASS INDEX: 36.25 KG/M2 | WEIGHT: 192 LBS

## 2021-12-01 DIAGNOSIS — T82.590D MALFUNCTION OF ARTERIOVENOUS DIALYSIS FISTULA, SUBSEQUENT ENCOUNTER: Chronic | ICD-10-CM

## 2021-12-01 PROBLEM — T82.590A DIALYSIS AV FISTULA MALFUNCTION (HCC): Chronic | Status: ACTIVE | Noted: 2021-12-01

## 2021-12-01 PROCEDURE — 99024 POSTOP FOLLOW-UP VISIT: CPT | Performed by: SURGERY

## 2021-12-01 NOTE — PROGRESS NOTES
Vascular Surgery Outpatient Progress Note      Chief Complaint   Patient presents with    Post-Op Check     s/p AVF       HISTORY OF PRESENT ILLNESS:                The patient is a 66 y.o. female who returns for follow-up evaluation of left arm arteriovenous fistula elevation. Overall she is doing well she has no incisional complaints. She has no hand pain or discomfort. She to me the incision is healing nicely    Past Medical History:        Diagnosis Date    Anxiety and depression     Arthritis     COVID-19 05/2020    Diabetes mellitus ([facility]     Dysphagia, oral phase     Hemodialysis patient ([facility]     M-W-F    Hypertension     Hypertensive kidney disease with stage 4 chronic kidney disease ([address]     Kidney stones     Obesity     Sacral pressure ulcer 08/03/2021    stage 4    Unspecified osteoarthritis, unspecified site      Past Surgical History:        Procedure Laterality Date    ABDOMEN SURGERY N/A 5/4/2020    SACRAL WOUND DEBRIDEMENT CALL   WITH TIME AVAIL AM performed by Isaiah Sinclair MD at [address] [place] [address]  3/31/16    Laparoscopic-Dr. Shey Yosuif    CYSTOSCOPY  2011     for kidney stones    DIALYSIS FISTULA CREATION Left 8/5/2021    INSERTION FISTULA LEFT ARM performed by Rande Dandy, MD at [address] [place] [address] Right 11/18/2021    REVISION AV FISTULA LEFT ARM performed by Rande Dandy, MD at [address] [place] [address]  2009     right     UPPER GASTROINTESTINAL ENDOSCOPY  2.18.15    Dr. Chasity Vargas Findings: Mild Gastrits and Duodenitis, 2cm Hiatal Hernia    UPPER GASTROINTESTINAL ENDOSCOPY N/A 5/8/2020    EGD CONTROL HEMORRHAGE performed by Isaiah Sinclair MD at [address] [place] [address] 5/22/2020    EGD BEDSIDE performed by Jose Black MD at [facility]. [address] N/A 5/6/2021    INSERTION TUNNELED DIALYSIS CATHETER performed by Rande Dandy, MD at [facility] Medications:   Prior to Admission medications    Medication Sig Start Date End Date Taking? Authorizing Provider   Ferric Citrate (AURYXIA) 1  MG(Fe) TABS Take 2 tablets by mouth Take with meals. Historical Provider, MD   glucagon, rDNA, 1 MG injection once    Historical Provider, MD   linaCLOtide (LINZESS) 72 MCG CAPS capsule Take 72 mcg by mouth every morning (before breakfast)    Historical Provider, MD   midodrine (PROAMATINE) 5 MG tablet Take 5 mg by mouth three times a week M-W-F    Historical Provider, MD   neomycin-bacitracin-polymyxin (NEOSPORIN) 5-400-5000 ointment Apply topically 4 times daily Apply topically 4 times daily. Historical Provider, MD   ondansetron (ZOFRAN) 4 MG tablet Take 4 mg by mouth every 8 hours as needed for Nausea or Vomiting    Historical Provider, MD   bisacodyl (DULCOLAX) 5 MG EC tablet Take 10 mg by mouth daily as needed for Constipation    Historical Provider, MD   docusate sodium (COLACE) 100 MG capsule Take 100 mg by mouth 2 times daily    Historical Provider, MD   glucagon 1 MG injection Inject 1 kit into the muscle as needed (HYPOGLYCEMIA)    Historical Provider, MD   insulin lispro (HUMALOG) 100 UNIT/ML injection vial Inject 10 Units into the skin 3 times daily (before meals)    Historical Provider, MD   Vitamin D (CHOLECALCIFEROL) 50 MCG (2000 UT) TABS tablet Take 1 tablet by mouth daily 11/20/20   Yolanda Hernandez MD   SITagliptin (JANUVIA) 25 MG tablet Take 25 mg by mouth daily    Historical Provider, MD   magnesium hydroxide (MILK OF MAGNESIA) 400 MG/5ML suspension Take 30 mLs by mouth daily as needed for Constipation     Historical Provider, MD   metoprolol succinate (TOPROL XL) 25 MG extended release tablet Take 1 tablet by mouth daily 10/12/20   Josué Lakhani MD   escitalopram (LEXAPRO) 5 MG tablet Take 10 mg by mouth daily    Historical Provider, MD   gabapentin (NEURONTIN) 300 MG capsule Take 300 mg by mouth 3 times daily.      Historical Provider, MD : Not on file   Social Connections:     Frequency of Communication with Friends and Family: Not on file    Frequency of Social Gatherings with Friends and Family: Not on file    Attends Temple Services: Not on file    Active Member of [address] [place] [address] [employer] or Organizations: Not on file    Attends Club or Organization Meetings: Not on file    Marital Status: Not on file   Intimate Partner Violence:     Fear of Current or Ex-Partner: Not on file    Emotionally Abused: Not on file    Physically Abused: Not on file    Sexually Abused: Not on file   Housing Stability:     Unable to Pay for Housing in the Last Year: Not on file    Number of [place] in the Last Year: Not on file    Unstable Housing in the Last Year: Not on file        Family History   Problem Relation Age of Onset    Cancer Sister        REVIEW OF SYSTEMS:    Constitutional: Negative for activity change, appetite change, chills, diaphoresis, fatigue, fever and unexpected weight change. HEENT: Negative for congestion, ear discharge, ear pain, hearing loss, nosebleeds, rhinorrhea, sinus pain, sore throat, tinnitus, trouble swallowing and voice change. Eyes: Negative for photophobia, pain, discharge, redness, itching and visual disturbance. Respiratory: Negative for apnea, cough, chest tightness, shortness of breath and wheezing. Cardiovascular: Negative for chest pain, palpitations and leg swelling. Gastrointestinal: Negative for abdominal distention, abdominal pain, blood in stool, constipation, diarrhea, nausea and vomiting. Endocrine: Negative for cold intolerance, heat intolerance, polydipsia, polyphagia and polyuria. Genitourinary: Negative for decreased urine volume, difficulty urinating, dysuria, frequency, hematuria and urgency. Musculoskeletal: Negative for arthralgias, back pain, gait problem, joint swelling and neck pain. Skin: Negative for color change, pallor, rash and wound.    Allergic/Immunologic: Negative for environmental allergies, food allergies and immunocompromised state. Neurological: Negative for dizziness, syncope, facial asymmetry, speech difficulty, weakness, light-headedness, numbness and headaches. Hematological: Negative for adenopathy. Does not bruise/bleed easily. Psychiatric/Behavioral: Negative for agitation, confusion, sleep disturbance and suicidal ideas. The patient is not nervous/anxious. PHYSICAL EXAM:  There were no vitals filed for this visit. General Appearance: alert and oriented to person, place and time, well developed and well- nourished, in no acute distress  Skin: warm and dry, no rash or erythema, skin glue still in place. Healing  Head: normocephalic and atraumatic  Eyes: extraocular eye movements intact, conjunctivae normal  ENT: external ear and ear canal normal bilaterally, nose without deformity  Pulmonary/Chest: clear to auscultation bilaterally- no wheezes, rales or rhonchi, normal air movement, no respiratory distress  Cardiovascular: normal rate, regular rhythm, normal S1 and S2, no murmurs, no carotid bruits  Musculoskeletal: normal range of motion, no joint swelling, deformity or tenderness  Neurologic: no cranial nerve deficit, gait, coordination and speech normal  Extremities: Left arm with a nice palpable thrill. The incision is healing nicely palpable radial pulse of 1+    Problem List Items Addressed This Visit     Dialysis AV fistula malfunction (HCC) (Chronic)          1 status post fistula elevation secondary to depth. I have this point it appears to be developing nicely. He needs still another several weeks before it is able to be cannulated assuming all goes well. She will call if is any questions or concerns right now she is doing well we will see her back in 4 weeks      No follow-ups on file.

## 2021-12-03 NOTE — ADDENDUM NOTE
Addendum  created 12/03/21 1522 by Yvette Stafford MD    Clinical Note Signed, Diagnosis association updated, Intraprocedure Blocks edited

## 2021-12-03 NOTE — ADDENDUM NOTE
Addendum  created 12/03/21 1528 by Himanshu Chand MD    Clinical Note Signed, Diagnosis association updated, Intraprocedure Blocks edited

## 2021-12-05 ENCOUNTER — HOSPITAL ENCOUNTER (INPATIENT)
Age: 71
LOS: 2 days | Discharge: NURSING, REHAB OR LONG-TERM CARE | DRG: 592 | End: 2021-12-07
Attending: EMERGENCY MEDICINE | Admitting: FAMILY MEDICINE
Payer: COMMERCIAL

## 2021-12-05 ENCOUNTER — APPOINTMENT (OUTPATIENT)
Dept: CT IMAGING | Age: 71
DRG: 592 | End: 2021-12-05
Payer: COMMERCIAL

## 2021-12-05 DIAGNOSIS — D62 ACUTE BLOOD LOSS ANEMIA: Primary | ICD-10-CM

## 2021-12-05 LAB
ABO/RH: NORMAL
ALBUMIN SERPL-MCNC: 3 G/DL (ref 3.5–5.2)
ALP BLD-CCNC: 108 U/L (ref 35–104)
ALT SERPL-CCNC: 7 U/L (ref 0–32)
ANION GAP SERPL CALCULATED.3IONS-SCNC: 11 MMOL/L (ref 7–16)
ANTIBODY SCREEN: NORMAL
APTT: 28.3 SEC (ref 24.5–35.1)
AST SERPL-CCNC: 12 U/L (ref 0–31)
BASOPHILS ABSOLUTE: 0.05 E9/L (ref 0–0.2)
BASOPHILS RELATIVE PERCENT: 0.6 % (ref 0–2)
BILIRUB SERPL-MCNC: <0.2 MG/DL (ref 0–1.2)
BUN BLDV-MCNC: 46 MG/DL (ref 6–23)
CALCIUM SERPL-MCNC: 9.2 MG/DL (ref 8.6–10.2)
CHLORIDE BLD-SCNC: 95 MMOL/L (ref 98–107)
CO2: 29 MMOL/L (ref 22–29)
CREAT SERPL-MCNC: 6.4 MG/DL (ref 0.5–1)
EOSINOPHILS ABSOLUTE: 0.27 E9/L (ref 0.05–0.5)
EOSINOPHILS RELATIVE PERCENT: 3.3 % (ref 0–6)
GFR AFRICAN AMERICAN: 8
GFR NON-AFRICAN AMERICAN: 6 ML/MIN/1.73
GLUCOSE BLD-MCNC: 154 MG/DL (ref 74–99)
HCT VFR BLD CALC: 25.2 % (ref 34–48)
HCT VFR BLD CALC: 27.9 % (ref 34–48)
HEMOGLOBIN: 7.8 G/DL (ref 11.5–15.5)
HEMOGLOBIN: 8.5 G/DL (ref 11.5–15.5)
IMMATURE GRANULOCYTES #: 0.04 E9/L
IMMATURE GRANULOCYTES %: 0.5 % (ref 0–5)
INR BLD: 1
LYMPHOCYTES ABSOLUTE: 1.42 E9/L (ref 1.5–4)
LYMPHOCYTES RELATIVE PERCENT: 17.2 % (ref 20–42)
MCH RBC QN AUTO: 31.5 PG (ref 26–35)
MCHC RBC AUTO-ENTMCNC: 30.5 % (ref 32–34.5)
MCV RBC AUTO: 103.3 FL (ref 80–99.9)
MONOCYTES ABSOLUTE: 0.94 E9/L (ref 0.1–0.95)
MONOCYTES RELATIVE PERCENT: 11.4 % (ref 2–12)
NEUTROPHILS ABSOLUTE: 5.52 E9/L (ref 1.8–7.3)
NEUTROPHILS RELATIVE PERCENT: 67 % (ref 43–80)
PDW BLD-RTO: 14.4 FL (ref 11.5–15)
PLATELET # BLD: 212 E9/L (ref 130–450)
PMV BLD AUTO: 10 FL (ref 7–12)
POTASSIUM REFLEX MAGNESIUM: 4.4 MMOL/L (ref 3.5–5)
PROTHROMBIN TIME: 10.8 SEC (ref 9.3–12.4)
RBC # BLD: 2.7 E12/L (ref 3.5–5.5)
SODIUM BLD-SCNC: 135 MMOL/L (ref 132–146)
TOTAL PROTEIN: 6.5 G/DL (ref 6.4–8.3)
WBC # BLD: 8.2 E9/L (ref 4.5–11.5)

## 2021-12-05 PROCEDURE — 99284 EMERGENCY DEPT VISIT MOD MDM: CPT

## 2021-12-05 PROCEDURE — 85610 PROTHROMBIN TIME: CPT

## 2021-12-05 PROCEDURE — 2060000000 HC ICU INTERMEDIATE R&B

## 2021-12-05 PROCEDURE — 80053 COMPREHEN METABOLIC PANEL: CPT

## 2021-12-05 PROCEDURE — 86900 BLOOD TYPING SEROLOGIC ABO: CPT

## 2021-12-05 PROCEDURE — G0378 HOSPITAL OBSERVATION PER HR: HCPCS

## 2021-12-05 PROCEDURE — 85730 THROMBOPLASTIN TIME PARTIAL: CPT

## 2021-12-05 PROCEDURE — 6360000002 HC RX W HCPCS: Performed by: EMERGENCY MEDICINE

## 2021-12-05 PROCEDURE — 85014 HEMATOCRIT: CPT

## 2021-12-05 PROCEDURE — 86850 RBC ANTIBODY SCREEN: CPT

## 2021-12-05 PROCEDURE — 96374 THER/PROPH/DIAG INJ IV PUSH: CPT

## 2021-12-05 PROCEDURE — 85018 HEMOGLOBIN: CPT

## 2021-12-05 PROCEDURE — 74176 CT ABD & PELVIS W/O CONTRAST: CPT

## 2021-12-05 PROCEDURE — 85025 COMPLETE CBC W/AUTO DIFF WBC: CPT

## 2021-12-05 PROCEDURE — 86901 BLOOD TYPING SEROLOGIC RH(D): CPT

## 2021-12-05 RX ORDER — BUSPIRONE HYDROCHLORIDE 5 MG/1
5 TABLET ORAL 3 TIMES DAILY
Status: DISCONTINUED | OUTPATIENT
Start: 2021-12-06 | End: 2021-12-07 | Stop reason: HOSPADM

## 2021-12-05 RX ORDER — ONDANSETRON 4 MG/1
4 TABLET, FILM COATED ORAL EVERY 8 HOURS PRN
Status: DISCONTINUED | OUTPATIENT
Start: 2021-12-05 | End: 2021-12-07 | Stop reason: HOSPADM

## 2021-12-05 RX ORDER — GABAPENTIN 300 MG/1
300 CAPSULE ORAL 3 TIMES DAILY
Status: DISCONTINUED | OUTPATIENT
Start: 2021-12-06 | End: 2021-12-07 | Stop reason: HOSPADM

## 2021-12-05 RX ORDER — SODIUM CHLORIDE 0.9 % (FLUSH) 0.9 %
5-40 SYRINGE (ML) INJECTION PRN
Status: DISCONTINUED | OUTPATIENT
Start: 2021-12-05 | End: 2021-12-07 | Stop reason: HOSPADM

## 2021-12-05 RX ORDER — SODIUM CHLORIDE 9 MG/ML
25 INJECTION, SOLUTION INTRAVENOUS PRN
Status: DISCONTINUED | OUTPATIENT
Start: 2021-12-05 | End: 2021-12-07 | Stop reason: HOSPADM

## 2021-12-05 RX ORDER — ACETAMINOPHEN 325 MG/1
650 TABLET ORAL EVERY 6 HOURS PRN
Status: DISCONTINUED | OUTPATIENT
Start: 2021-12-05 | End: 2021-12-07 | Stop reason: HOSPADM

## 2021-12-05 RX ORDER — POLYETHYLENE GLYCOL 3350 17 G/17G
17 POWDER, FOR SOLUTION ORAL DAILY
Status: DISCONTINUED | OUTPATIENT
Start: 2021-12-06 | End: 2021-12-07 | Stop reason: HOSPADM

## 2021-12-05 RX ORDER — SENNA PLUS 8.6 MG/1
2 TABLET ORAL 2 TIMES DAILY
Status: DISCONTINUED | OUTPATIENT
Start: 2021-12-06 | End: 2021-12-07 | Stop reason: HOSPADM

## 2021-12-05 RX ORDER — METOPROLOL SUCCINATE 25 MG/1
25 TABLET, EXTENDED RELEASE ORAL DAILY
Status: DISCONTINUED | OUTPATIENT
Start: 2021-12-06 | End: 2021-12-07 | Stop reason: HOSPADM

## 2021-12-05 RX ORDER — OXYCODONE AND ACETAMINOPHEN 7.5; 325 MG/1; MG/1
1 TABLET ORAL EVERY 8 HOURS PRN
Status: DISCONTINUED | OUTPATIENT
Start: 2021-12-05 | End: 2021-12-05 | Stop reason: CLARIF

## 2021-12-05 RX ORDER — DOCUSATE SODIUM 100 MG/1
100 CAPSULE, LIQUID FILLED ORAL 2 TIMES DAILY
Status: DISCONTINUED | OUTPATIENT
Start: 2021-12-06 | End: 2021-12-07 | Stop reason: HOSPADM

## 2021-12-05 RX ORDER — INSULIN GLARGINE 100 [IU]/ML
10 INJECTION, SOLUTION SUBCUTANEOUS NIGHTLY
Status: DISCONTINUED | OUTPATIENT
Start: 2021-12-06 | End: 2021-12-07 | Stop reason: HOSPADM

## 2021-12-05 RX ORDER — SODIUM CHLORIDE 0.9 % (FLUSH) 0.9 %
5-40 SYRINGE (ML) INJECTION EVERY 12 HOURS SCHEDULED
Status: DISCONTINUED | OUTPATIENT
Start: 2021-12-06 | End: 2021-12-07 | Stop reason: HOSPADM

## 2021-12-05 RX ORDER — MIDODRINE HYDROCHLORIDE 5 MG/1
5 TABLET ORAL
Status: DISCONTINUED | OUTPATIENT
Start: 2021-12-06 | End: 2021-12-07 | Stop reason: HOSPADM

## 2021-12-05 RX ORDER — CHOLECALCIFEROL (VITAMIN D3) 50 MCG
2000 TABLET ORAL DAILY
Status: DISCONTINUED | OUTPATIENT
Start: 2021-12-06 | End: 2021-12-07 | Stop reason: HOSPADM

## 2021-12-05 RX ORDER — ESCITALOPRAM OXALATE 10 MG/1
10 TABLET ORAL DAILY
Status: DISCONTINUED | OUTPATIENT
Start: 2021-12-06 | End: 2021-12-07 | Stop reason: HOSPADM

## 2021-12-05 RX ADMIN — HYDROMORPHONE HYDROCHLORIDE 0.5 MG: 1 INJECTION, SOLUTION INTRAMUSCULAR; INTRAVENOUS; SUBCUTANEOUS at 18:31

## 2021-12-05 ASSESSMENT — PAIN SCALES - GENERAL
PAINLEVEL_OUTOF10: 7
PAINLEVEL_OUTOF10: 3
PAINLEVEL_OUTOF10: 0

## 2021-12-05 NOTE — ED PROVIDER NOTES
Chief Complaint   Patient presents with    Wound Check       HPI  Hunter Lora is a 66 y.o. female who presents with with concerns for a bleeding wound on her left hip. The complaints are acute, moderate in severity, worsened by nothing and improved by nothing. The patient states that she was having chronic wounds evaluated by nursing at her facility this morning when she was told that one of the wounds was bleeding profusely. She states that the facility was unable to get the bleeding to stop so they sent her to the ER to be evaluated. The patient denies any pain and states that she does not take any blood thinners. She notes that her wounds are chronic. The patient denies recent trauma, fever, chills, fatigue, HA, dizziness, lightheadedness, paresthesias, generalized weakness, confusion, forgetfulness, vision changes, eye redness, congestion, rhinorrhea, sore throat, neck pain, chest pain, palpitations, LE edema, SOB, cough, wheezing, abdominal pain, nausea, vomiting, diarrhea, constipation, hematochezia, melena, dysuria, hematuria, flank pain, decreased UOP, myalgias, arthralgias, ROM issues, swelling, rashes and erythema. ROS is otherwise negative. The patient is currently taking no blood thinners. Tobacco Hx:   reports that she quit smoking about 10 years ago. She has a 30.00 pack-year smoking history. She has never used smokeless tobacco.    Alcohol Hx:   reports no history of alcohol use. Illicit Drug Hx:   reports no history of drug use. The history is provided by the patient. Last Tetanus (if applicable): n/a    Review of Systems:   A complete review of systems was performed and pertinent positives and negatives are stated within the HPI, all other systems reviewed and are negative.     Physical Exam:  GEN: Cooperative, well-developed, well-nourished adult in NAD; pt appears stated age  Head: Normocephalic and atraumatic; no tenderness to palpation anywhere  Eyes: PERRL, EOMI, conjunctiva clear, cornea without gross abnormality, no visual deficits noted b/l  Ears: External ear normal-appearing and non-tender b/l; no hearing deficit noted  Nose: Nares patent and free of debris; septum midline; mucosa appears normal; no drainage noted  Throat: Oral mucosa moist and pink with no lesions noted; dentition wnl; no posterior pharyngeal erythema or edema; tonsils are not swollen and there is no exudate noted; no post-nasal drip  Neck: Supple and symmetrical with midline trachea; no cervical or supraclavicular lymphadenopathy noted; thyroid not palpated, but no tenderness or masses noted in the region; normal ROM with no meningeal signs  Lungs: LCTAB with no wheezes, rhonchi or rales noted; no respiratory distress, breathing unlabored   CV: RRR, no murmurs, gallops or rubs noted on auscultation, normal S1/S2; UE and LE distal pulses intact b/l +2/4 with no cyanosis noted; no LE edema noted b/l; capillary refill < 2 seconds  ABD: Soft, non-tender, non-distended, no organomegaly, hernias or masses noted  /Rectal: no suprapubic tenderness; remainder of exam deferred  MSK: UEs and LEs without notable trauma, ROM restriction or tenderness to palpation b/l; normal strength throughout; there is a small hematoma noted near the bleeding site on the patient's left hip, nontender to palpation  Skin: Skin warm and dry without notable rash, erythema or bruising; normal turgor there are several small lesions noted along the patient's left abdomen and left flank.  No active bleeding, however the drainage pad I removed was completely saturated with blood; there is a large sacral ulcer, stage IV as previously documented  Neuro: A&O x3; CN II-XII appear grossly intact; no focal deficits appreciated; pt thoughtful in discussion and able to answer all questions without issue  Psych: normal attention with no obvious AVH, normal mood, normal affect, no SI/HI; patient with appropriate this physician. LABS:  Results for orders placed or performed during the hospital encounter of 12/05/21   CBC Auto Differential   Result Value Ref Range    WBC 8.2 4.5 - 11.5 E9/L    RBC 2.70 (L) 3.50 - 5.50 E12/L    Hemoglobin 8.5 (L) 11.5 - 15.5 g/dL    Hematocrit 27.9 (L) 34.0 - 48.0 %    .3 (H) 80.0 - 99.9 fL    MCH 31.5 26.0 - 35.0 pg    MCHC 30.5 (L) 32.0 - 34.5 %    RDW 14.4 11.5 - 15.0 fL    Platelets 218 258 - 424 E9/L    MPV 10.0 7.0 - 12.0 fL    Neutrophils % 67.0 43.0 - 80.0 %    Immature Granulocytes % 0.5 0.0 - 5.0 %    Lymphocytes % 17.2 (L) 20.0 - 42.0 %    Monocytes % 11.4 2.0 - 12.0 %    Eosinophils % 3.3 0.0 - 6.0 %    Basophils % 0.6 0.0 - 2.0 %    Neutrophils Absolute 5.52 1.80 - 7.30 E9/L    Immature Granulocytes # 0.04 E9/L    Lymphocytes Absolute 1.42 (L) 1.50 - 4.00 E9/L    Monocytes Absolute 0.94 0.10 - 0.95 E9/L    Eosinophils Absolute 0.27 0.05 - 0.50 E9/L    Basophils Absolute 0.05 0.00 - 0.20 E9/L   Hemoglobin and hematocrit, blood   Result Value Ref Range    Hemoglobin 7.8 (L) 11.5 - 15.5 g/dL    Hematocrit 25.2 (L) 34.0 - 48.0 %   Protime-INR   Result Value Ref Range    Protime 10.8 9.3 - 12.4 sec    INR 1.0    APTT   Result Value Ref Range    aPTT 28.3 24.5 - 35.1 sec   TYPE AND SCREEN   Result Value Ref Range    ABO/Rh O POS     Antibody Screen NEG        RADIOLOGY: Interpreted by Radiologist unless otherwise noted. CT ABDOMEN PELVIS WO CONTRAST Additional Contrast? None    (Results Pending)       Oxygen Saturation Interpretation: Normal    Meds Given:  Medications - No data to display    Procedures:  No procedures performed. --------------------------------- PROGRESS NOTES / ADDITIONAL PROVIDER NOTES ---------------------------------  Consultations:  As outlined below. ED Course:  ED Course as of 12/05/21 1656   Sun Dec 05, 2021   1603 Patient's hemoglobin continues to drop.  I updated her on the status of her work-up and informed her that, given that her hemoglobin continues to drop and is down approximately 2-1/2-3 points from her baseline 2 weeks ago, she will need to be admitted to the hospital for further evaluation and management. The patient verbalized her understanding and agree with that plan. [ML]      ED Course User Index  [ML] Jorge Becerra DO       4:56 PM: All results were discussed with the patient and/or their surrogate and I have provided specific details regarding the plan to admit the patient. The patient was seen in the emergency department by myself and the assigned attending physician, Rue Phalen, DO, who agreed with the assessment, plan and decision to admit as laid out herein. The patient and/or family verbalized an understanding and agreement with the plan for admission and all questions were answered to the highest degree of accuracy possible based on the current information available. The patient was without objective evidence of hemodynamic instability and was therefore deemed to be in stable condition at the time of transport. This patient's ED course included: a personal history and physicial examination, re-evaluation prior to disposition, multiple bedside re-evaluations, IV medications, cardiac monitoring and continuous pulse oximetry    MDM:  Patient presented with concerns for bleeding wound. On arrival, all vital signs were within normal limits. Physical exam was as documented above. Labs were remarkable for anemia, with an initial hemoglobin of 8.5 and a repeat of 7.8. Hgb two weeks ago was 10.2. Due to the patient's consistently dropping hemoglobin and reports of clinically significant bleeding this morning, the decision was made to admit her to the hospital for further evaluation and management, likely to include serial H&H's. The case was discussed with IM who agreed to admit the patient. The patient was stable at the time of their disposition. Diagnosis:  1.  Acute blood loss anemia        Disposition:  Patient's disposition: Admit to telemetry  Patient's condition is stable. This patient was seen, examined and treated with Damian Wilson DO. All pertinent aspects of the patient's care were discussed with the attending physician.          Deb Mortensen DO  Resident  12/05/21 5529

## 2021-12-06 LAB
ANION GAP SERPL CALCULATED.3IONS-SCNC: 14 MMOL/L (ref 7–16)
BASOPHILS ABSOLUTE: 0.07 E9/L (ref 0–0.2)
BASOPHILS RELATIVE PERCENT: 1 % (ref 0–2)
BUN BLDV-MCNC: 55 MG/DL (ref 6–23)
CALCIUM SERPL-MCNC: 8.9 MG/DL (ref 8.6–10.2)
CHLORIDE BLD-SCNC: 98 MMOL/L (ref 98–107)
CO2: 27 MMOL/L (ref 22–29)
CREAT SERPL-MCNC: 7.4 MG/DL (ref 0.5–1)
EOSINOPHILS ABSOLUTE: 0.28 E9/L (ref 0.05–0.5)
EOSINOPHILS RELATIVE PERCENT: 4 % (ref 0–6)
GFR AFRICAN AMERICAN: 7
GFR NON-AFRICAN AMERICAN: 5 ML/MIN/1.73
GLUCOSE BLD-MCNC: 189 MG/DL (ref 74–99)
HCT VFR BLD CALC: 23.7 % (ref 34–48)
HCT VFR BLD CALC: 26.3 % (ref 34–48)
HEMOGLOBIN: 7.4 G/DL (ref 11.5–15.5)
HEMOGLOBIN: 8.2 G/DL (ref 11.5–15.5)
IMMATURE GRANULOCYTES #: 0.03 E9/L
IMMATURE GRANULOCYTES %: 0.4 % (ref 0–5)
LYMPHOCYTES ABSOLUTE: 1.53 E9/L (ref 1.5–4)
LYMPHOCYTES RELATIVE PERCENT: 21.6 % (ref 20–42)
MCH RBC QN AUTO: 32.2 PG (ref 26–35)
MCH RBC QN AUTO: 32.2 PG (ref 26–35)
MCHC RBC AUTO-ENTMCNC: 31.2 % (ref 32–34.5)
MCHC RBC AUTO-ENTMCNC: 31.2 % (ref 32–34.5)
MCV RBC AUTO: 103 FL (ref 80–99.9)
MCV RBC AUTO: 103.1 FL (ref 80–99.9)
METER GLUCOSE: 117 MG/DL (ref 74–99)
METER GLUCOSE: 136 MG/DL (ref 74–99)
METER GLUCOSE: 174 MG/DL (ref 74–99)
METER GLUCOSE: 207 MG/DL (ref 74–99)
METER GLUCOSE: 89 MG/DL (ref 74–99)
MONOCYTES ABSOLUTE: 0.8 E9/L (ref 0.1–0.95)
MONOCYTES RELATIVE PERCENT: 11.3 % (ref 2–12)
NEUTROPHILS ABSOLUTE: 4.37 E9/L (ref 1.8–7.3)
NEUTROPHILS RELATIVE PERCENT: 61.7 % (ref 43–80)
PDW BLD-RTO: 14.3 FL (ref 11.5–15)
PDW BLD-RTO: 14.6 FL (ref 11.5–15)
PLATELET # BLD: 186 E9/L (ref 130–450)
PLATELET # BLD: 207 E9/L (ref 130–450)
PMV BLD AUTO: 10.1 FL (ref 7–12)
PMV BLD AUTO: 9.9 FL (ref 7–12)
POTASSIUM SERPL-SCNC: 5.3 MMOL/L (ref 3.5–5)
RBC # BLD: 2.3 E12/L (ref 3.5–5.5)
RBC # BLD: 2.55 E12/L (ref 3.5–5.5)
SODIUM BLD-SCNC: 139 MMOL/L (ref 132–146)
WBC # BLD: 6 E9/L (ref 4.5–11.5)
WBC # BLD: 7.1 E9/L (ref 4.5–11.5)

## 2021-12-06 PROCEDURE — 2060000000 HC ICU INTERMEDIATE R&B

## 2021-12-06 PROCEDURE — 85027 COMPLETE CBC AUTOMATED: CPT

## 2021-12-06 PROCEDURE — G0378 HOSPITAL OBSERVATION PER HR: HCPCS

## 2021-12-06 PROCEDURE — 80048 BASIC METABOLIC PNL TOTAL CA: CPT

## 2021-12-06 PROCEDURE — 90935 HEMODIALYSIS ONE EVALUATION: CPT | Performed by: INTERNAL MEDICINE

## 2021-12-06 PROCEDURE — 2580000003 HC RX 258: Performed by: FAMILY MEDICINE

## 2021-12-06 PROCEDURE — 82962 GLUCOSE BLOOD TEST: CPT

## 2021-12-06 PROCEDURE — 36415 COLL VENOUS BLD VENIPUNCTURE: CPT

## 2021-12-06 PROCEDURE — 5A1D70Z PERFORMANCE OF URINARY FILTRATION, INTERMITTENT, LESS THAN 6 HOURS PER DAY: ICD-10-PCS | Performed by: FAMILY MEDICINE

## 2021-12-06 PROCEDURE — 97162 PT EVAL MOD COMPLEX 30 MIN: CPT

## 2021-12-06 PROCEDURE — 80074 ACUTE HEPATITIS PANEL: CPT

## 2021-12-06 PROCEDURE — 85025 COMPLETE CBC W/AUTO DIFF WBC: CPT

## 2021-12-06 PROCEDURE — 90935 HEMODIALYSIS ONE EVALUATION: CPT

## 2021-12-06 PROCEDURE — 97530 THERAPEUTIC ACTIVITIES: CPT

## 2021-12-06 PROCEDURE — 6370000000 HC RX 637 (ALT 250 FOR IP): Performed by: FAMILY MEDICINE

## 2021-12-06 PROCEDURE — 97165 OT EVAL LOW COMPLEX 30 MIN: CPT

## 2021-12-06 PROCEDURE — 86706 HEP B SURFACE ANTIBODY: CPT

## 2021-12-06 RX ORDER — ALOGLIPTIN 6.25 MG/1
6.25 TABLET, FILM COATED ORAL DAILY
Status: DISCONTINUED | OUTPATIENT
Start: 2021-12-06 | End: 2021-12-07 | Stop reason: HOSPADM

## 2021-12-06 RX ORDER — DEXTROSE MONOHYDRATE 50 MG/ML
100 INJECTION, SOLUTION INTRAVENOUS PRN
Status: DISCONTINUED | OUTPATIENT
Start: 2021-12-06 | End: 2021-12-07 | Stop reason: HOSPADM

## 2021-12-06 RX ORDER — OXYCODONE HYDROCHLORIDE AND ACETAMINOPHEN 5; 325 MG/1; MG/1
1 TABLET ORAL EVERY 8 HOURS PRN
Status: DISCONTINUED | OUTPATIENT
Start: 2021-12-06 | End: 2021-12-07 | Stop reason: HOSPADM

## 2021-12-06 RX ORDER — NICOTINE POLACRILEX 4 MG
15 LOZENGE BUCCAL PRN
Status: DISCONTINUED | OUTPATIENT
Start: 2021-12-06 | End: 2021-12-07 | Stop reason: HOSPADM

## 2021-12-06 RX ORDER — OXYCODONE HYDROCHLORIDE 5 MG/1
2.5 TABLET ORAL EVERY 8 HOURS PRN
Status: DISCONTINUED | OUTPATIENT
Start: 2021-12-06 | End: 2021-12-07 | Stop reason: HOSPADM

## 2021-12-06 RX ORDER — DEXTROSE MONOHYDRATE 25 G/50ML
12.5 INJECTION, SOLUTION INTRAVENOUS PRN
Status: DISCONTINUED | OUTPATIENT
Start: 2021-12-06 | End: 2021-12-07 | Stop reason: HOSPADM

## 2021-12-06 RX ADMIN — BUSPIRONE HYDROCHLORIDE 5 MG: 5 TABLET ORAL at 21:02

## 2021-12-06 RX ADMIN — SENNOSIDES 17.2 MG: 8.6 TABLET, COATED ORAL at 08:55

## 2021-12-06 RX ADMIN — ALOGLIPTIN 6.25 MG: 6.25 TABLET, FILM COATED ORAL at 08:55

## 2021-12-06 RX ADMIN — POLYETHYLENE GLYCOL 3350 17 G: 17 POWDER, FOR SOLUTION ORAL at 08:55

## 2021-12-06 RX ADMIN — MIDODRINE HYDROCHLORIDE 5 MG: 5 TABLET ORAL at 08:55

## 2021-12-06 RX ADMIN — Medication 2000 UNITS: at 08:55

## 2021-12-06 RX ADMIN — SENNOSIDES 17.2 MG: 8.6 TABLET, COATED ORAL at 00:25

## 2021-12-06 RX ADMIN — ESCITALOPRAM OXALATE 10 MG: 10 TABLET ORAL at 08:55

## 2021-12-06 RX ADMIN — OXYCODONE AND ACETAMINOPHEN 1 TABLET: 5; 325 TABLET ORAL at 05:54

## 2021-12-06 RX ADMIN — DOCUSATE SODIUM 100 MG: 100 CAPSULE ORAL at 08:55

## 2021-12-06 RX ADMIN — METOPROLOL SUCCINATE 25 MG: 25 TABLET, EXTENDED RELEASE ORAL at 08:55

## 2021-12-06 RX ADMIN — BUSPIRONE HYDROCHLORIDE 5 MG: 5 TABLET ORAL at 17:22

## 2021-12-06 RX ADMIN — INSULIN GLARGINE 10 UNITS: 100 INJECTION, SOLUTION SUBCUTANEOUS at 00:24

## 2021-12-06 RX ADMIN — SENNOSIDES 17.2 MG: 8.6 TABLET, COATED ORAL at 21:03

## 2021-12-06 RX ADMIN — BUSPIRONE HYDROCHLORIDE 5 MG: 5 TABLET ORAL at 08:55

## 2021-12-06 RX ADMIN — Medication 10 ML: at 08:56

## 2021-12-06 RX ADMIN — INSULIN GLARGINE 10 UNITS: 100 INJECTION, SOLUTION SUBCUTANEOUS at 23:09

## 2021-12-06 RX ADMIN — DOCUSATE SODIUM 100 MG: 100 CAPSULE ORAL at 00:27

## 2021-12-06 RX ADMIN — DOCUSATE SODIUM 100 MG: 100 CAPSULE ORAL at 21:03

## 2021-12-06 RX ADMIN — Medication 10 ML: at 22:31

## 2021-12-06 ASSESSMENT — PAIN SCALES - GENERAL
PAINLEVEL_OUTOF10: 5
PAINLEVEL_OUTOF10: 7
PAINLEVEL_OUTOF10: 5

## 2021-12-06 ASSESSMENT — PAIN DESCRIPTION - ONSET: ONSET: ON-GOING

## 2021-12-06 ASSESSMENT — PAIN DESCRIPTION - PROGRESSION: CLINICAL_PROGRESSION: NOT CHANGED

## 2021-12-06 ASSESSMENT — PAIN DESCRIPTION - FREQUENCY: FREQUENCY: CONTINUOUS

## 2021-12-06 ASSESSMENT — PAIN DESCRIPTION - PAIN TYPE: TYPE: ACUTE PAIN

## 2021-12-06 ASSESSMENT — PAIN DESCRIPTION - LOCATION: LOCATION: BUTTOCKS

## 2021-12-06 NOTE — FLOWSHEET NOTE
Pt completed 2.5hrs of HD on a 2K bath with 800mL of UF removed. 12/06/21 1538   Vital Signs   BP (!) 90/50   Temp 97.8 °F (36.6 °C)   Pulse 53   Resp 20   Weight 203 lb 7.8 oz (92.3 kg)   Weight Method Bed scale   Percent Weight Change -0.86   Pain Assessment   Pain Assessment 0-10   Pain Level 5   Pain Type Acute pain   Pain Location Buttocks   Pain Frequency Continuous   Pain Onset On-going   Clinical Progression Not changed   Post-Hemodialysis Assessment   Post-Treatment Procedures Blood returned; Catheter capped, clamped with Citrate x 2 ports   Machine Disinfection Process Acid/Vinegar Clean; Heat Disinfect;  Exterior Machine Disinfection   Rinseback Volume (ml) 300 ml   Total Liters Processed (l/min) 56.3 l/min   Dialyzer Clearance Moderately streaked   Duration of Treatment (minutes) 180 minutes   Hemodialysis Output (ml) 1100 ml   NET Removed (ml) 300 ml   Tolerated Treatment Good   Patient Response to Treatment tx term with 30min remaining due to Pt refusal; states shes tearful and miserable due to not being able to have a bowel movement; bedpan offered but pt refused saying it needed to be \"pulled out\";  blood returned; post report to Pattie Perla RN   Bilateral Breath Sounds Diminished   Edema Generalized

## 2021-12-06 NOTE — PROGRESS NOTES
Physical Therapy Initial Assessment     Name: Meño Carrington  : 1950  MRN: [id number]      Date of Service: 2021    Evaluating PT:  Jesus Murcia, PT, DPT [id number]      Room #:  4354/7978-Z  Diagnosis:  Acute blood loss anemia [D62]  PMHx/PSHx:  HTN, Obesity, CKD, Sacral pressure ulcer, Arthritis, DM, Hemodialysis patient, COVID-19, Anxiety/depression, Foot surgery, Cholecystectomy, Abdomen surgery, Vascular surgery  Procedure/Surgery:  NA  Precautions:  Falls, Sacral wound  Equipment Needs:  None, provided by facility    SUBJECTIVE:    Pt lives in an ECF. Pt reported being a roger lift. Pt is dependent for all ADLs. Pt reported not getting out of bed every day. Pt able to self propel WC. OBJECTIVE:   Initial Evaluation  Date: 2021 Treatment Date:  NA Short Term/ Long Term   Goals   AM-PAC 6 Clicks 0/37     Was pt agreeable to Eval/treatment? Yes      Does pt have pain? Reported pain at sacral wound site. Did not quantify. Bed Mobility  Rolling: Max A   Supine to sit: Max A +2  Sit to supine: Max A +2  Scooting: Mod A seated EOB   Rolling: Mod A +1-2  Supine to sit: Mod A +1-2  Sit to supine: Mod A +1-2  Scooting: Min A in seated   Transfers Sit to stand: Max/Mod A +2  Stand to sit: Max/Mod A +2  Stand pivot: NT  Sit to stand: Mod A  Stand to sit: Mod A  Stand pivot: Max A   Ambulation    3-4 side steps with [employer] with Max A +2  >10 feet with [employer] Mod A +1-2   Stair negotiation: ascended and descended  NT  NA   ROM BUE:  WFL  BLE:  WFL     Strength BUE:  4-/5  BLE:  3/5  Increase strength 1/3 grade. Balance Sitting EOB:  SBA  Dynamic Standing: Max A +2  Sitting EOB:  Supervision  Dynamic Standing:   Mod A +1-2     Pt is A & O x 3  Sensation:  Pt denies numbness and tingling to extremities  Edema:  None noted    Vitals:  Blood Pressure at rest - Blood Pressure post session -   Heart Rate at rest - Heart Rate post session -   SPO2 at rest - SPO2 post session -     Therapeutic Exercises: better. Prognosis is good for reaching above PT goals. Patient and or family understand(s) diagnosis, prognosis, and plan of care. Yes     PHYSICAL THERAPY PLAN OF CARE:    PT POC is established based on physician order and patient diagnosis     Referring provider/PT Order:    12/06/21 0000  PT evaluation and treat  Start:  12/06/21 0000,   End:  12/06/21 0000,   ONE TIME,   Standing Count:  1 Occurrences,   R         Yahaira Singh MD     Diagnosis:  Acute blood loss anemia [D62]  Specific instructions for next treatment:  Subsequent PT sessions with focus on improved mobility, ambulation    Current Treatment Recommendations:     [x] Strengthening to improve independence with functional mobility   [] ROM to improve independence with functional mobility   [x] Balance Training to improve static/dynamic balance and to reduce fall risk  [x] Endurance Training to improve activity tolerance during functional mobility   [x] Transfer Training to improve safety and independence with all functional transfers   [x] Gait Training to improve gait mechanics, endurance and asses need for appropriate assistive device  [] Stair Training in preparation for safe discharge home and/or into the community   [x] Positioning to prevent skin breakdown and contractures  [x] Safety and Education Training   [x] Patient/Caregiver Education   [] HEP  [] Other     PT long term treatment goals are located in above grid    Frequency of treatments: 2-5x/week x 1-2 weeks. Time in  1113  Time out  1134    Total Treatment Time 12 minutes     Evaluation Time includes thorough review of current medical information, gathering information on past medical history/social history and prior level of function, completion of standardized testing/informal observation of tasks, assessment of data and education on plan of care and goals.     CPT codes:  [] Low Complexity PT evaluation 42094  [x] Moderate Complexity PT evaluation 13416  [] High Complexity PT evaluation [id number]  [] PT Re-evaluation [id number]  [] Gait training 19175 - minutes  [] Manual therapy 63666 - minutes  [x] Therapeutic activities 70717 12 minutes  [] Therapeutic exercises 87589 - minutes  [] Neuromuscular reeducation 10065 - minutes     Belkys Villalta, PT, DPT  License [id number]

## 2021-12-06 NOTE — PROGRESS NOTES
Attention Dr Jef Corcoran:  The patient's current CrCl is 9 mL/min and is currently ordered Neurontin 300 mg PO three times a day. According to official package labeling, if the patient's CrCl is less than 15 mL/min, the maximum recommended dose for Neurontin is 300 mg per day.   [facility]  12/6/2021  12:14 AM

## 2021-12-06 NOTE — CONSULTS
Nephrology Consult Note  Patient's Name: Hunter Lora  3:04 PM  12/6/2021    Nephrologist: Raffi Martin    Reason for Consult:  ESKD  Requesting Physician:  Philip Justice MD    Chief Complaint:  Bleeding from the L Hip    History Obtained From:  patient and past medical records    History of Present Ilness:    Hunter Lora is a 66 y.o. female with prior history ESKD receiving IHD on an MWF schedule. She was sent in from the [facility] for bleeding from the decubitus. She denies any CP or SOB. She denies any abd pain. Past Medical History:   Diagnosis Date    Anxiety and depression     Arthritis     COVID-19 05/2020    [facility] [address]     Dysphagia, oral phase     Hemodialysis patient ([facility] [address]     M-W-F    Hypertension     Hypertensive kidney disease with stage 4 chronic kidney disease ([address]     Kidney stones     Obesity     Sacral pressure ulcer 08/03/2021    stage 4    Unspecified osteoarthritis, unspecified site        Past Surgical History:   Procedure Laterality Date    ABDOMEN SURGERY N/A 5/4/2020    SACRAL WOUND DEBRIDEMENT CALL DRWatson  WITH TIME AVAIL AM performed by Isaiah Sinclair MD at [address] [place] [address]  x3   [address] [place] [address]  3/31/16    Laparoscopic-Dr. Shey Yousif    CYSTOSCOPY  2011     for kidney stones    DIALYSIS FISTULA CREATION Left 8/5/2021    INSERTION FISTULA LEFT ARM performed by Rande Dandy, MD at [facility] Right 11/18/2021    REVISION AV FISTULA LEFT ARM performed by Rande Dandy, MD at [address] [place] [address]  2009     right     UPPER GASTROINTESTINAL ENDOSCOPY  2.18.15    Dr. Chasity Vargas Findings: Mild Gastrits and Duodenitis, 2cm Hiatal Hernia    UPPER GASTROINTESTINAL ENDOSCOPY N/A 5/8/2020    EGD CONTROL HEMORRHAGE performed by Isaiah Sinclair MD at [address] [place] [address] 5/22/2020    EGD BEDSIDE performed by Jose Black MD at [facility] [place] [address] N/A 5/6/2021    INSERTION TUNNELED DIALYSIS CATHETER performed by Los Shanks MD at [facility] OR       Family History   Problem Relation Age of Onset    Cancer Sister         reports that she quit smoking about 10 years ago. She has a 30.00 pack-year smoking history. She has never used smokeless tobacco. She reports that she does not drink alcohol and does not use drugs. Allergies:  Patient has no known allergies. Current Medications:    alogliptin (NESINA) tablet 6.25 mg, Daily  oxyCODONE-acetaminophen (PERCOCET) 5-325 MG per tablet 1 tablet, Q8H PRN   And  oxyCODONE (ROXICODONE) immediate release tablet 2.5 mg, Q8H PRN  glucose (GLUTOSE) 40 % oral gel 15 g, PRN  dextrose 50 % IV solution, PRN  glucagon (rDNA) injection 1 mg, PRN  dextrose 5 % solution, PRN  acetaminophen (TYLENOL) tablet 650 mg, Q6H PRN  bisacodyl (DULCOLAX) EC tablet 10 mg, Daily PRN  busPIRone (BUSPAR) tablet 5 mg, TID  docusate sodium (COLACE) capsule 100 mg, BID  escitalopram (LEXAPRO) tablet 10 mg, Daily  Ferric Citrate TABS 420 mg, Daily  gabapentin (NEURONTIN) capsule 300 mg, TID  insulin glargine (LANTUS) injection vial 10 Units, Nightly  insulin lispro (HUMALOG) injection vial 10 Units, TID AC  linaCLOtide (LINZESS) capsule 72 mcg, QAM AC  magnesium hydroxide (MILK OF MAGNESIA) 400 MG/5ML suspension 30 mL, Daily PRN  metoprolol succinate (TOPROL XL) extended release tablet 25 mg, Daily  midodrine (PROAMATINE) tablet 5 mg, Once per day on Mon Wed Fri  ondansetron (ZOFRAN) tablet 4 mg, Q8H PRN  polyethylene glycol (GLYCOLAX) packet 17 g, Daily  senna (SENOKOT) tablet 17.2 mg, BID  vitamin D (CHOLECALCIFEROL) tablet 2,000 Units, Daily  sodium chloride flush 0.9 % injection 5-40 mL, 2 times per day  sodium chloride flush 0.9 % injection 5-40 mL, PRN  0.9 % sodium chloride infusion, PRN        Review of Systems:   Pertinent items are noted in HPI.     Physical exam:   Constitutional:  Elderly female in NAD  Vitals:   VITALS:  BP (!) 105/51   Pulse 60   Temp 97.7 °F (36.5 °C)   Resp 18   Ht 5' 6\" (1.676 m)   Wt 205 lb 4 oz (93.1 kg)   SpO2 100%   BMI 33.13 kg/m²   24HR INTAKE/OUTPUT:    Intake/Output Summary (Last 24 hours) at 12/6/2021 1505  Last data filed at 12/6/2021 1256  Gross per 24 hour   Intake 240 ml   Output --   Net 240 ml     URINARY CATHETER OUTPUT ([place]):     DRAIN/TUBE OUTPUT:     VENT SETTINGS:  Vent Information  SpO2: 100 %  Additional Respiratory  Assessments  Pulse: 60  Resp: 18  SpO2: 100 %    Skin: no rash, turgor wnl  Heent:  eomi, mmm  Neck: no bruits or jvd noted, R IJ TDC  Cardiovascular:  S1, S2 without m/r/g  Respiratory: decreased BS at the bases  Abdomen:  +bs, soft, nt, nd  Ext: 1+ bilat lower extremity edema  Psychiatric: mood and affect appropriate    Data:   Labs:  CBC:   Lab Results   Component Value Date    WBC 6.0 12/06/2021    RBC 2.30 12/06/2021    HGB 7.4 12/06/2021    HCT 23.7 12/06/2021    .0 12/06/2021    MCH 32.2 12/06/2021    MCHC 31.2 12/06/2021    RDW 14.3 12/06/2021     12/06/2021    MPV 10.1 12/06/2021     CBC with Differential:    Lab Results   Component Value Date    WBC 6.0 12/06/2021    RBC 2.30 12/06/2021    HGB 7.4 12/06/2021    HCT 23.7 12/06/2021     12/06/2021    .0 12/06/2021    MCH 32.2 12/06/2021    MCHC 31.2 12/06/2021    RDW 14.3 12/06/2021    NRBC 0.9 05/25/2020    SEGSPCT 71 08/16/2013    BANDSPCT 1 03/29/2016    METASPCT 0.9 05/10/2020    LYMPHOPCT 21.6 12/06/2021    PROMYELOPCT 0.9 05/09/2020    MONOPCT 11.3 12/06/2021    MYELOPCT 1.7 05/11/2020    BASOPCT 1.0 12/06/2021    MONOSABS 0.80 12/06/2021    LYMPHSABS 1.53 12/06/2021    EOSABS 0.28 12/06/2021    BASOSABS 0.07 12/06/2021     Hemoglobin/Hematocrit:    Lab Results   Component Value Date    HGB 7.4 12/06/2021    HCT 23.7 12/06/2021     CMP:    Lab Results   Component Value Date     12/06/2021    K 5.3 12/06/2021    K 4.4 12/05/2021    CL 98 12/06/2021    CO2 27 12/06/2021    BUN 55 12/06/2021    CREATININE 7.4 12/06/2021    GFRAA 7 12/06/2021    LABGLOM 5 12/06/2021    GLUCOSE 189 12/06/2021    GLUCOSE 149 10/12/2011    PROT 6.5 12/05/2021    LABALBU 3.0 12/05/2021    LABALBU 4.1 10/12/2011    CALCIUM 8.9 12/06/2021    BILITOT <0.2 12/05/2021    ALKPHOS 108 12/05/2021    AST 12 12/05/2021    ALT 7 12/05/2021     BMP:    Lab Results   Component Value Date     12/06/2021    K 5.3 12/06/2021    K 4.4 12/05/2021    CL 98 12/06/2021    CO2 27 12/06/2021    BUN 55 12/06/2021    LABALBU 3.0 12/05/2021    LABALBU 4.1 10/12/2011    CREATININE 7.4 12/06/2021    CALCIUM 8.9 12/06/2021    GFRAA 7 12/06/2021    LABGLOM 5 12/06/2021    GLUCOSE 189 12/06/2021    GLUCOSE 149 10/12/2011     BUN/Creatinine:    Lab Results   Component Value Date    BUN 55 12/06/2021    CREATININE 7.4 12/06/2021     Hepatic Function Panel:    Lab Results   Component Value Date    ALKPHOS 108 12/05/2021    ALT 7 12/05/2021    AST 12 12/05/2021    PROT 6.5 12/05/2021    BILITOT <0.2 12/05/2021    BILIDIR <0.2 01/07/2021    IBILI see below 01/07/2021    LABALBU 3.0 12/05/2021    LABALBU 4.1 10/12/2011     Albumin:    Lab Results   Component Value Date    LABALBU 3.0 12/05/2021    LABALBU 4.1 10/12/2011     Calcium:    Lab Results   Component Value Date    CALCIUM 8.9 12/06/2021     Ionized Calcium:    Lab Results   Component Value Date    IONCA 1.59 06/26/2013     Magnesium:    Lab Results   Component Value Date    MG 3.0 01/10/2021     Phosphorus:    Lab Results   Component Value Date    PHOS 6.6 05/05/2021     LDH:    Lab Results   Component Value Date     05/24/2020     Uric Acid:    Lab Results   Component Value Date    LABURIC 7.7 07/19/2019    URICACID 11.6 10/12/2011     PT/INR:    Lab Results   Component Value Date    PROTIME 10.8 12/05/2021    PROTIME 15.3 06/25/2011    INR 1.0 12/05/2021     PTT:    Lab Results   Component Value Date    APTT 28.3 12/05/2021   [APTT}  Troponin:    Lab Results   Component Value Date    TROPONINI 0.09 10/09/2020     U/A:    Lab Results   Component Value Date    COLORU Yellow 05/03/2021    PROTEINU >=300 05/03/2021    PHUR 7.0 05/03/2021    WBCUA PACKED 05/03/2021    WBCUA 5-10 08/17/2011    RBCUA 0-1 05/03/2021    RBCUA 0-1 06/26/2013    YEAST Present 08/11/2020    BACTERIA MODERATE 05/03/2021    CLARITYU Clear 05/03/2021    SPECGRAV 1.020 05/03/2021    LEUKOCYTESUR LARGE 05/03/2021    UROBILINOGEN 0.2 05/03/2021    BILIRUBINUR Negative 05/03/2021    BILIRUBINUR NEGATIVE 08/17/2011    BLOODU SMALL 05/03/2021    GLUCOSEU Negative 05/03/2021    GLUCOSEU NEGATIVE 08/17/2011    AMORPHOUS FEW 06/25/2011     ABG:    Lab Results   Component Value Date    PH 7.433 05/10/2020    PH 7.377 06/27/2011    PCO2 31.0 05/10/2020    PO2 106.3 05/10/2020    HCO3 20.3 05/10/2020    BE -3.2 05/10/2020    O2SAT 97.5 05/10/2020     HgBA1c:    Lab Results   Component Value Date    LABA1C 6.9 05/18/2020     Microalbumen/Creatinine ratio:  No components found for: RUCREAT  FLP:    Lab Results   Component Value Date    TRIG 181 10/12/2011    HDL 37.0 10/12/2011    LDLCALC 118 10/12/2011     TSH:    Lab Results   Component Value Date    TSH 1.945 08/16/2013     VITAMIN B12: No components found for: B12  FOLATE:    Lab Results   Component Value Date    FOLATE >20.0 05/05/2021     Iron Saturation:  No components found for: PERCENTFE  FERRITIN:    Lab Results   Component Value Date    FERRITIN 767 05/05/2021     AMYLASE:    Lab Results   Component Value Date    AMYLASE 20 07/02/2011     LIPASE:    Lab Results   Component Value Date    LIPASE 14 01/31/2020     Fibrinogen Level:  No components found for: FIB  Urine Toxicology:  No components found for: IAMMENTA, IBARBIT, IBENZO, ICOCAINE, IMARTHC, IOPIATES, IPHENCYC  24 Hour Urine for Protein:  No components found for: RAWUPRO, UHRS3, KDSO21JT, UTV3  24 Hour Urine for Creatinine Clearance:  No components found for: CREAT4, UHRS10, UTV10     Imaging:  EXAMINATION:  CT OF THE ABDOMEN AND PELVIS WITHOUT CONTRAST 12/5/2021 6:02 pm     TECHNIQUE:  CT of the abdomen and pelvis was performed without the administration of  intravenous contrast. Multiplanar reformatted images are provided for review. Dose modulation, iterative reconstruction, and/or weight based adjustment of  the mA/kV was utilized to reduce the radiation dose to as low as reasonably  achievable.     COMPARISON:  None.     HISTORY:  ORDERING SYSTEM PROVIDED HISTORY: Multiple wounds in the pelvis and  lumbosacral regions, one bleeding on the left side with a small hematoma  present  TECHNOLOGIST PROVIDED HISTORY:  Reason for exam:->Multiple wounds in the pelvis and lumbosacral regions, one  bleeding on the left side with a small hematoma present  Additional Contrast?->None     FINDINGS:  Lower Chest: No significant parenchymal abnormalities are noted at the lung  bases.  There are no signs of pleural pericardial effusion.  The mitral  annulus is heavily calcified the heart is not appear enlarged.     Organs:  The liver appears dense, suggest steatosis.  In the right lobe there  is a 13 mm low-density focus that contains calcifications.  This could  represent an hemangioma.  There are no signs of duct dilation.  The  gallbladder is surgically absent.  The pancreas spleen and adrenal glands  revealed no acute abnormalities.     The kidneys reveal cortical thinning bilaterally.  There is a high density  process involving the inferior pole the right kidney measuring 17 mm this  could represent a proteinaceous cyst.  There are additional low-density areas  extending from the interpolar region measuring 12 mm that could also  represent cysts.  No obstructing stones are observed.     The left kidney also reveals marked cortical thinning.  Extending from the  superior pole there is a 7 mm low-density focus.  From the interpolar region  there is a 6 mm low-density focus.  There are vascular calcifications  present.  No obstructing stones are seen.     GI/Bowel: The stomach appears food filled.  The small bowel pattern is within  normal range.  There are no signs of mechanical obstruction.  The area of the  terminal ileum and cecum revealed no acute abnormalities.  The appendix is  not identified may be surgically absent there is a very large volume of  retained fecal material within the rectal vault measures 8.4 x 8.1 cm  compatible with fecal impaction     Pelvis: The urinary bladder reveals a slightly thickened wall but this is not  specific.  The bladder is not distended.  No acute abnormalities of the  uterus or adnexa are visualized.  The uterus is heavily calcified, could  represent remote fibroid change.     There are no signs of significant lymphadenopathy or ascites within the  pelvis.  There is gas within the subcutaneous tissues overlying the cut  sacrum and coccyx concerning for decubitus ulcer.  The gas overlies the  sacrum and underlying osteomyelitis cannot be entirely excluded in this  region.     Peritoneum/Retroperitoneum: There is an umbilical hernia containing adipose. There are no signs of retroperitoneal lymphadenopathy or aneurysm present.     Bones/Soft Tissues:        Impression  1. Very large volume of retained fecal material within the colon suggests  constipation with signs of fecal impaction due to marked distension of the  rectal vault. 2. Decubitus ulcer involving the lower sacrum and coccyx  3. Multiple renal cysts  4. Dense appearance of the liver suggest steatosis  5. Marked cortical thinning suggests chronic medical renal disease. 6. Probable hemangiomata of the liver        Assessment  1-ESKD requiring KRT with IHD on treatment MWF via R  TDC  PLAN:  1. Pt seen on IHD this PM attempting  2.3L vol removal-call by IHD RN after my visit and stated pt had a 2 min episode of blank staring /96, no tremor or seizure like activitiy    2-Hyperkalemia  K+ 5.3  PLAN:  1. Correct with IHD  2.  Follow K+    3-Anemia in CKD  HgB below goal of 10-exacerbated by blood loss from the wound  PLAN:  1. Continue on ARUN  2. Follow H/H  3. Check Ferritin and Iron Sat    4. Sec HPTH of Renal origin  Ca++ WNL  PLAN:  1. Check PO4    5. Decubitus Ulcer  PLAN:  1.  Defer to IM    Thank you Dr. Philip Justice MD for allowing us to participate in care of José Miguel Fili Adrian MD  3:04 PM  12/6/2021

## 2021-12-06 NOTE — ED NOTES
SBAR faxed, confirmation received. Called to confirm SBAR was received.      Randa Underwood RN  12/05/21 [id number]

## 2021-12-06 NOTE — CARE COORDINATION
Social work / Discharge Planning:        Patient is a bed hold from [facility]. No precert needed to return. Social work was unable to confirm plan to return because patient is out of the room at dialysis. N-17 and ambulance form completed. She goes to [address] [place] [address]. They will need to be informed when discharged.     Electronically signed by SHANTA Paredes on 12/6/2021 at 12:58 PM

## 2021-12-06 NOTE — PROGRESS NOTES
Travon Underwood was ordered Ferric Citrate (AURYXIA) and linaCLOtide Derrill Simpler) which are nonformulary medications. The patient has indicated that the home supply of these medications will be brought in to the hospital for inpatient use. If the medications have not been administered by 1400 on the following day from the time the orders were placed, a pharmacist will follow-up with the nurse of the patient to assess the capability of the patient to bring in the medications. If it is determined that the patient cannot supply the medications and they are not available to be dispensed from the pharmacy, a call will be placed to the ordering provider to discuss alternative options.   Vinay Vo [facility]  12/6/2021  12:08 AM

## 2021-12-06 NOTE — PROGRESS NOTES
Inpatient Wound Care    Admit Date: 12/5/2021 11:23 AM    Reason for consult:  ***    Significant history:  ***    Wound history:  ***    Findings:  ***    Impression:  ***    Interventions in place:  ***    Plan:***      PABLO Olguin 12/6/2021 5:02 PM

## 2021-12-06 NOTE — PROGRESS NOTES
Occupational Therapy  OCCUPATIONAL THERAPY INITIAL EVALUATION  [facility] [address]    Date: 2021     Patient Name: Nate Jacobo  MRN: [id number]  : 1950  Room: [address] [place] [address]    Evaluating OT: Veronica Alegre, RESHMAR/PASTORA - [id number]    Referring Provider: Trupti Tao MD  Specific Provider Orders/Date: \"OT eval and treat\" - 2021    Diagnosis: Acute blood loss anemia [D62]     Patient presented to the hospital secondary to experiencing profuse bleeding from one of her wounds. Pertinent Medical History: obesity, COVID-19 (May 2020), DM, arthritis, HTN, OA, anxiety and depression     Precautions: fall risk, skin integrity    Assessment of Current Deficits:    [x] Functional mobility   [x]ADLs  [x] Strength               [x]Cognition   [x] Functional transfers   [x] IADLs         [x] Safety Awareness   [x]Endurance   [] Fine Coordination              [x] Balance      [] Vision/perception   [x]Sensation    []Gross Motor Coordination  [] ROM  [] Delirium                   [] Motor Control     OT PLAN OF CARE   OT POC is based on physician orders, patient diagnosis, and results of clinical assessment.   Frequency/Duration 1-3 days/week for 2 weeks PRN   Specific OT Treatment Interventions to Include:   * Instruction/training on adapted ADL techniques and AE recommendations to increase functional independence within precautions       * Training on energy conservation strategies, correct breathing pattern and techniques to improve independence/tolerance for self-care routine  * Functional transfer/mobility training/DME recommendations for increased independence, safety, and fall prevention  * Patient/Family education to increase follow through with safety techniques and functional independence  * Recommendation of environmental modifications for increased safety with functional transfers/mobility and ADLs  * Therapeutic exercise to improve motor endurance, ROM, and functional strength for ADLs/functional transfers  * Therapeutic activities to facilitate/challenge dynamic balance, stand tolerance for increased safety and independence with ADLs  * Neuro-muscular re-education: facilitation of righting/equilibrium reactions, midline orientation, scapular stability/mobility, normalization of muscle tone, and facilitation of volitional active controled movement  * Positioning to improve skin integrity, interaction with environment and functional independence    Recommended Adaptive Equipment: TBD    Home Living: Patient admitted from [facility]. Prior Level of Function (PLOF): Patient indicated that she was needing assistance with ADLs recently. Patient noted that a roger lift was being used to facilitate functional transfers recently. Patient indicated that she had been able to perform stand-pivot transfers with the assistance of one therapist \"a month ago\". Pain Level: Patient reported experiencing pain at site of her wounds (L buttocks and sacrum), which she rated 4 out of 10 at start of this session. Cognition: Patient alert and oriented grossly. WFL command follow demonstrated. Patient pleasant, cooperative, and motivated to maximize independence with ADLs and functional transfers/mobility. Memory: WFL grossly  Sequencing: WFL grossly  Problem Solving: WFL grossly  Judgement/Safety: WFL grossly    Functional Assessment:  AM-PAC Daily Activity Raw Score: 15/24   Initial Eval Status  Date: 12/6/2021 Treatment Status  Date:  Short Term Goals = Long Term Goals   Feeding Setup  Independent   Grooming Min A  Setup  (seated)   UB Dressing Min A  Setup   LB Dressing Max A to don socks. Min A - with use of AE, as needed/appropriate   Bathing Max A  Min A - with use of AE/DME, as needed/appropriate   Toileting Max A  Min A   Bed Mobility  Supine-to-Sit: Max Ax2  Sit-to-Supine: Max Ax2  Rolling: Max A  Scooting (while seated at EOB):  Mod A     Functional Transfers Sit-to-Stand: Mod Ax2 to Max Ax2   from EOB  Cues given to facilitate proper hand placement. Min A   Functional Mobility Mod Ax2 to Max Ax2  (with walker) toward HOB. Min A with functional mobility (with device, as needed/appropriate) in order to maximize independence with ADLs/IADLs and other functional tasks. Balance Sitting: Good-  (at EOB)  Standing: Poor to Poor+  (with walker)  Posterior lean noted consistently. Fair dynamic standing balance during completion of ADLs/IADLs and other functional tasks. Activity Tolerance Limited secondary to dizziness. Patient will demonstrate Good understanding and consistent implementation of energy conservation techniques and work simplification techniques into ADL/IADL routines. Visual/  Perceptual WFL     N/A   B UE Strength 3+/5 grossly  Patient will demonstrate 4/5 B UE strength in order to maximize independence with ADLs, bed mobility, and functional transfers. Additional Long-Term Goal: Patient will increase functional independence to PLOF in order to allow patient to live in least restrictive environment. ROM: Additional Information:    R UE  WFL grossly    L UE WFL grossly      Hearing: WFL grossly  Sensation: No complaints of numbness/tingling in B UEs. Tone: WFL  Edema: No    Comments: RN approved patient's participation in [address] [place] [address] activities. Upon arrival, patient supine in bed. At end of session, patient supine in bed with call light and phone within reach, bed alarm activated, wound care nurse present, and all lines and tubes intact. Patient would benefit from continued skilled OT to increase safety and independence with completion of ADL/IADL tasks for functional independence and quality of life. Rehab Potential: Good for established goals. Patient / Family Goal: Patient did not state a goal.  Patient and/or family were instructed on functional diagnosis, prognosis/goals, and OT plan of care.  Demonstrated Fair+ understanding. Eval Complexity: Low    Time In: 1115  Time Out: 1135  Total Treatment Time: 0 minutes      Minutes Units   OT Eval Low 59838 20 1   OT Eval Medium 29606     OT Eval High 19920     OT Re-Eval [id number]     Therapeutic Ex 05786     Therapeutic Activities 21565     ADL/Self Care 37578     Orthotic Management 42036     Neuro Re-Ed 73238     Non-Billable Time N/A ---     Evaluation time includes thorough review of current medical information, gathering information on past medical history/social history and prior level of function, completion of standardized testing/informal observation of tasks, assessment of data, and education on plan of care and goals. Analy Almazan, OTR/L  License Number: [id number]

## 2021-12-06 NOTE — DISCHARGE INSTR - COC
Code    Neurologic gait dysfunction R26.9    Renal failure, acute on chronic (HCC) N17.9, N18.9    Diabetes mellitus ([address] E11.9    Hypertension I10    Arthritis M19.90    Kidney disease N28.9    Acute blood loss anemia D62    Knee problem M25.9    Anemia due to stage 3 chronic kidney disease N18.30, D63.1    Primary osteoarthritis of both knees M17.0    Acute on chronic renal insufficiency N28.9, N18.9    Acute kidney injury superimposed on CKD ([address] N17.9, F02.9    Metabolic acidosis V49.1    Acute renal failure superimposed on chronic kidney disease, on chronic dialysis ([address] N17.9, N18.9, Z99.2    Sepsis ([address] A41.9    2019 novel coronavirus disease (COVID-19) U07.1    GI bleed K92.2    Moderate protein-calorie malnutrition ([facility]) E44.0    Hyperkalemia E87.5    PERLA (acute kidney injury) ([address] N17.9    Wound of left breast S21.002A    End stage renal disease ([address] N18.6    Encounter regarding vascular access for dialysis for end-stage renal disease ([facility] N18.6, Z99.2    Pressure injury of sacral region, stage 4 ([facility]) L89.154    Pressure injury of right hip, stage 3 ([address] D18.112    Dialysis AV fistula malfunction ([facility]) T82.590A       Isolation/Infection:   Isolation            No Isolation          Patient Infection Status       Infection Onset Added Last Indicated Last Indicated By Review Planned Expiration Resolved Resolved By    None active    Resolved    COVID-19 (Rule Out) 21 COVID-19 (Ordered)   21 Rule-Out Test Resulted    COVID-19 (Rule Out) 10/09/20 10/09/20 10/09/20 COVID-19 (Ordered)   10/09/20 Rule-Out Test Resulted    COVID-19 (Rule Out) 20 COVID-19 (Ordered)   20     VRE 20 Culture, Wound   10/12/20 Gamaliel Brown RN    Enterococcus faecium Wound-Coccyx 20    MDRO (multi-drug resistant organism) 20 Culture, Wound   10/12/20 Gamaliel Brown RN    COVID-19 (Rule Out) 20 20 COVID-19 (Ordered)   20 Rule-Out Test Resulted    DETECTED 2020    COVID-19 (Rule Out) 05/14/20 05/15/20 05/14/20 Covid-19 Ambulatory (Ordered)   20 Rule-Out Test Resulted    Detected 2020    COVID-19 (Rule Out) 20 COVID-19 (Ordered)   20 Rule-Out Test Resulted    DETECTED 2020    C-diff Rule Out 20 CLOSTRIDIUM DIFFICILE EIA (Ordered)   20 Uday Felix RN    Does not meet criteria    MRSA 20 Culture, Wound   20 Jamar Jimenes RN    Wound buttock 5/3/20    COVID-19 20 COVID-19   20     Detected 2020, 2020, 2020, 5/3/2020    COVID-19 (Rule Out) 20 COVID-19 (Ordered)   20 Rule-Out Test Resulted    DETECTED 5/3/2020            Nurse Assessment:  Last Vital Signs: /61   Pulse 62   Temp 97.7 °F (36.5 °C) (Oral)   Resp 18   Ht 5' 6\" (1.676 m)   Wt 205 lb 4.8 oz (93.1 kg)   SpO2 100%   BMI 33.14 kg/m²     Last documented pain score (0-10 scale): Pain Level: 5  Last Weight:   Wt Readings from Last 1 Encounters:   21 205 lb 4.8 oz (93.1 kg)     Mental Status:  oriented and alert    IV Access:  - None    Nursing Mobility/ADLs:  Walking   Assisted  Transfer  Assisted  Bathing  Assisted  Dressing  Assisted  Toileting  Assisted  Feeding  Independent  Med Admin  Independent  Med Delivery   whole    Wound Care Documentation and Therapy:  Wound 10/10/20 Coccyx (Active)   Number of days: 917       Wound 21 Coccyx (Active)   Wound Etiology Pressure Stage  4 21 0730   Dressing Status Clean; Dry; Intact 21 0730   Wound Cleansed Irrigated with saline 21 023   Dressing/Treatment Foam 21 0232   Wound Length (cm) 6 cm 21 0232   Wound Width (cm) 5 cm 21 0232   Wound Depth (cm) 3 cm 21 0232   Wound Surface Area (cm^2) 30 cm^2 21 0232   Change in Wound Size % (l*w) -3.95 12/06/21 0232   Wound Volume (cm^3) 90 cm^3 12/06/21 0232   Wound Healing % -212 12/06/21 0232   Drainage Amount Moderate 12/06/21 0232   Drainage Description Purulent 12/06/21 0232   Odor Moderate 12/06/21 0232   Number of days: 332       Wound 08/11/21 Coccyx #1 (Active)   Number of days: 117       Wound 08/11/21 Buttocks Right #2 (Active)   Number of days: 117        Elimination:  Continence: Bowel: Yes  Bladder: Yes  Urinary Catheter: None   Colostomy/Ileostomy/Ileal Conduit: No       Date of Last BM: ***  No intake or output data in the 24 hours ending 12/06/21 1259  No intake/output data recorded. Safety Concerns: At Risk for Falls    Impairments/Disabilities:      None    Nutrition Therapy:  Current Nutrition Therapy:   - Oral Diet:  General    Routes of Feeding: Oral  Liquids: Thin Liquids  Daily Fluid Restriction: no  Last Modified Barium Swallow with Video (Video Swallowing Test): not done    Treatments at the Time of Hospital Discharge:   Respiratory Treatments: ***  Oxygen Therapy:  is not on home oxygen therapy.   Ventilator:    - No ventilator support    Rehab Therapies: Physical Therapy and Occupational Therapy  Weight Bearing Status/Restrictions: No weight bearing restirctions  Other Medical Equipment (for information only, NOT a DME order):  walker and bedside commode  Other Treatments: ***    Patient's personal belongings (please select all that are sent with patient):  None    RN SIGNATURE:  Electronically signed by Toney Mccarty on 12/7/21 at 10:23 AM EST    CASE MANAGEMENT/SOCIAL WORK SECTION    Inpatient Status Date: ***    Readmission Risk Assessment Score:  Readmission Risk              Risk of Unplanned Readmission:  17           Discharging to Facility/ Agency   Name: [facility]  KQMMK:[phone]  Fax:[phone]    Dialysis Facility (if applicable)   Priscilla Valadez  Address:[address] [facility]  Dialysis Schedule:[facility]  Phone:[phone]  Fax:    / signature: Electronically signed by SHANTA Ugarte on 12/6/21 at 1:00 PM EST    PHYSICIAN SECTION    Prognosis: Good    Condition at Discharge: Stable    Rehab Potential (if transferring to Rehab): Good    Recommended Labs or Other Treatments After Discharge: cbc cmp    Physician Certification: I certify the above information and transfer of Aime Oquendo  is necessary for the continuing treatment of the diagnosis listed and that she requires Intermediate Nursing Care for greater 30 days.      Update Admission H&P: No change in H&P    PHYSICIAN SIGNATURE:  Electronically signed by Marcia Jade MD on 12/7/21 at 7:16 AM EST

## 2021-12-06 NOTE — H&P
[address] [facility]                  [address]                              HISTORY AND PHYSICAL    PATIENT NAME: Allison Thomason                      :        1950  MED REC NO:   [id number]                            ROOM:       0618  ACCOUNT NO:   [de-identified]                           ADMIT DATE: 2021  PROVIDER:     Catherine Espinosa MD    DATE OF ADMISSION:  2021    CHIEF COMPLAINT:  Bleeding from a sacral decubitus and blood loss  anemia. HISTORY OF PRESENT ILLNESS:  A 79-year-old residing at skilled nursing  facility, on hemodialysis, was brought to the emergency room due to  bleeding from a sacral decubitus. Hemoglobin, I believe in November,  was 10, went down to 8.5 and then 7.8. The patient was admitted for  bleeding to observe for any further bleeding and need for transfusion. This morning her hemoglobin is up a little bit to 8.2. RECENT AND PRESENT MEDICATIONS:  See med rec in the chart. PAST MEDICAL HISTORY:  Includes chronic renal failure, on dialysis;  diabetes; a remote history of sarcoidosis; osteoarthritis; hypertension;  hyperkalemia; and previous coronavirus infection. SOCIAL HISTORY:  Does not smoke. Does not drink. FAMILY MEDICAL HISTORY:  Noncontributory. ALLERGIES:  None known. REVIEW OF SYSTEMS:  SKIN AND LYMPHATICS:  Decubitus bleeding as stated above. CARDIAC:  Denies chest pain, orthopnea, or PND. DIGESTIVE:  No diarrhea, melena, or hematochezia. Does have chronic  constipation. MUSCULOSKELETAL:  Has severe osteoarthritis of both knees, considered  inoperable. PHYSICAL EXAMINATION:  GENERAL:  The patient is lying in bed. She is in no acute distress. VITAL SIGNS:  Temperature 97.7, pulse 62, respirations 18, blood  pressure 105/61, O2 sat on room air is 100%. SKIN:  Warm and dry. No pallor. No jaundice. HEENT:  Eyes reveal no icterus.   NECK:  Supple without bruits or masses. CHEST:  Clear to auscultation. HEART:  Regular rate and rhythm. ABDOMEN:  Obese, soft, and nontender. EXTREMITIES:  Reveal no cyanosis, clubbing, or edema. NEUROLOGICAL:  She is alert and oriented x3 with no focal neurological  deficits. LABORATORY STUDIES:  Once again, hemoglobin is 8.2 this morning. ASSESSMENT:  She does have a sacral decubitus as described above. There  is no active bleeding at this time. PLAN:  We will get the wound nurse in for wound orders. We will monitor  blood counts at least one more day. We will get dialysis today since it  is her day to get it and hopefully discharge back to skilled nursing in  the morning if all is stable.         Laura Schmitz MD    D: 12/06/2021 7:52:46       T: 12/06/2021 7:55:16     /[email or web address]  Job#: [id number]     Doc#: [id number]    CC:

## 2021-12-07 VITALS
HEIGHT: 66 IN | TEMPERATURE: 97.9 F | SYSTOLIC BLOOD PRESSURE: 92 MMHG | WEIGHT: 203.9 LBS | DIASTOLIC BLOOD PRESSURE: 42 MMHG | HEART RATE: 64 BPM | BODY MASS INDEX: 32.77 KG/M2 | OXYGEN SATURATION: 100 % | RESPIRATION RATE: 18 BRPM

## 2021-12-07 PROBLEM — T82.590A DIALYSIS AV FISTULA MALFUNCTION (HCC): Chronic | Status: RESOLVED | Noted: 2021-12-01 | Resolved: 2021-12-07

## 2021-12-07 PROBLEM — A41.9 SEPSIS (HCC): Status: RESOLVED | Noted: 2020-05-03 | Resolved: 2021-12-07

## 2021-12-07 PROBLEM — K92.2 GI BLEED: Status: RESOLVED | Noted: 2020-05-21 | Resolved: 2021-12-07

## 2021-12-07 PROBLEM — N18.6 ENCOUNTER REGARDING VASCULAR ACCESS FOR DIALYSIS FOR END-STAGE RENAL DISEASE (HCC): Status: RESOLVED | Noted: 2021-08-05 | Resolved: 2021-12-07

## 2021-12-07 PROBLEM — Z99.2 ENCOUNTER REGARDING VASCULAR ACCESS FOR DIALYSIS FOR END-STAGE RENAL DISEASE (HCC): Status: RESOLVED | Noted: 2021-08-05 | Resolved: 2021-12-07

## 2021-12-07 PROBLEM — E87.5 HYPERKALEMIA: Status: RESOLVED | Noted: 2020-10-09 | Resolved: 2021-12-07

## 2021-12-07 PROBLEM — E87.20 METABOLIC ACIDOSIS: Status: RESOLVED | Noted: 2020-02-02 | Resolved: 2021-12-07

## 2021-12-07 PROBLEM — S21.002A WOUND OF LEFT BREAST: Chronic | Status: RESOLVED | Noted: 2021-07-28 | Resolved: 2021-12-07

## 2021-12-07 LAB
ALBUMIN SERPL-MCNC: 3.1 G/DL (ref 3.5–5.2)
ALP BLD-CCNC: 106 U/L (ref 35–104)
ALT SERPL-CCNC: 6 U/L (ref 0–32)
ANION GAP SERPL CALCULATED.3IONS-SCNC: 11 MMOL/L (ref 7–16)
AST SERPL-CCNC: 12 U/L (ref 0–31)
BILIRUB SERPL-MCNC: 0.3 MG/DL (ref 0–1.2)
BUN BLDV-MCNC: 30 MG/DL (ref 6–23)
CALCIUM SERPL-MCNC: 9 MG/DL (ref 8.6–10.2)
CHLORIDE BLD-SCNC: 97 MMOL/L (ref 98–107)
CO2: 30 MMOL/L (ref 22–29)
CREAT SERPL-MCNC: 5.2 MG/DL (ref 0.5–1)
GFR AFRICAN AMERICAN: 10
GFR NON-AFRICAN AMERICAN: 8 ML/MIN/1.73
GLUCOSE BLD-MCNC: 95 MG/DL (ref 74–99)
HAV IGM SER IA-ACNC: NORMAL
HAV IGM SER IA-ACNC: NORMAL
HBV SURFACE AB TITR SER: NORMAL {TITER}
HBV SURFACE AB TITR SER: NORMAL {TITER}
HCT VFR BLD CALC: 24.4 % (ref 34–48)
HEMOGLOBIN: 7.6 G/DL (ref 11.5–15.5)
HEPATITIS B CORE IGM ANTIBODY: NORMAL
HEPATITIS B CORE IGM ANTIBODY: NORMAL
HEPATITIS B SURFACE ANTIGEN INTERPRETATION: NORMAL
HEPATITIS B SURFACE ANTIGEN INTERPRETATION: NORMAL
HEPATITIS C ANTIBODY INTERPRETATION: NORMAL
HEPATITIS C ANTIBODY INTERPRETATION: NORMAL
MAGNESIUM: 2.5 MG/DL (ref 1.6–2.6)
MCH RBC QN AUTO: 32.1 PG (ref 26–35)
MCHC RBC AUTO-ENTMCNC: 31.1 % (ref 32–34.5)
MCV RBC AUTO: 103 FL (ref 80–99.9)
METER GLUCOSE: 163 MG/DL (ref 74–99)
METER GLUCOSE: 78 MG/DL (ref 74–99)
PDW BLD-RTO: 14.5 FL (ref 11.5–15)
PHOSPHORUS: 4 MG/DL (ref 2.5–4.5)
PLATELET # BLD: 220 E9/L (ref 130–450)
PMV BLD AUTO: 9.9 FL (ref 7–12)
POTASSIUM SERPL-SCNC: 4.7 MMOL/L (ref 3.5–5)
RBC # BLD: 2.37 E12/L (ref 3.5–5.5)
SARS-COV-2, NAAT: NOT DETECTED
SODIUM BLD-SCNC: 138 MMOL/L (ref 132–146)
TOTAL PROTEIN: 6.3 G/DL (ref 6.4–8.3)
WBC # BLD: 5.8 E9/L (ref 4.5–11.5)

## 2021-12-07 PROCEDURE — 2580000003 HC RX 258: Performed by: FAMILY MEDICINE

## 2021-12-07 PROCEDURE — 85027 COMPLETE CBC AUTOMATED: CPT

## 2021-12-07 PROCEDURE — 84100 ASSAY OF PHOSPHORUS: CPT

## 2021-12-07 PROCEDURE — 36415 COLL VENOUS BLD VENIPUNCTURE: CPT

## 2021-12-07 PROCEDURE — 6370000000 HC RX 637 (ALT 250 FOR IP): Performed by: FAMILY MEDICINE

## 2021-12-07 PROCEDURE — 87635 SARS-COV-2 COVID-19 AMP PRB: CPT

## 2021-12-07 PROCEDURE — 80053 COMPREHEN METABOLIC PANEL: CPT

## 2021-12-07 PROCEDURE — G0378 HOSPITAL OBSERVATION PER HR: HCPCS

## 2021-12-07 PROCEDURE — 83735 ASSAY OF MAGNESIUM: CPT

## 2021-12-07 PROCEDURE — 82962 GLUCOSE BLOOD TEST: CPT

## 2021-12-07 RX ADMIN — POLYETHYLENE GLYCOL 3350 17 G: 17 POWDER, FOR SOLUTION ORAL at 08:10

## 2021-12-07 RX ADMIN — DOCUSATE SODIUM 100 MG: 100 CAPSULE ORAL at 08:10

## 2021-12-07 RX ADMIN — SENNOSIDES 17.2 MG: 8.6 TABLET, COATED ORAL at 08:10

## 2021-12-07 RX ADMIN — Medication 10 ML: at 08:12

## 2021-12-07 RX ADMIN — OXYCODONE AND ACETAMINOPHEN 1 TABLET: 5; 325 TABLET ORAL at 08:10

## 2021-12-07 RX ADMIN — Medication 2000 UNITS: at 08:11

## 2021-12-07 RX ADMIN — ESCITALOPRAM OXALATE 10 MG: 10 TABLET ORAL at 08:11

## 2021-12-07 RX ADMIN — ALOGLIPTIN 6.25 MG: 6.25 TABLET, FILM COATED ORAL at 08:10

## 2021-12-07 RX ADMIN — BUSPIRONE HYDROCHLORIDE 5 MG: 5 TABLET ORAL at 08:11

## 2021-12-07 RX ADMIN — GABAPENTIN 300 MG: 300 CAPSULE ORAL at 08:11

## 2021-12-07 ASSESSMENT — PAIN SCALES - GENERAL
PAINLEVEL_OUTOF10: 4
PAINLEVEL_OUTOF10: 5

## 2021-12-07 NOTE — CARE COORDINATION
Social work / Discharge plannin [facility]. Discharge to  [facility] at 1:30pm via [facility]. Social work updated RN, facility liaison and patient's son Mary Richardson.   Electronically signed by SHANTA Vitale on 2021 at 10:17 AM

## 2021-12-07 NOTE — PROGRESS NOTES
Subjective: The patient is awake and alert. No problems overnight. Denies chest pain, angina, and dyspnea. Denies abdominal pain. Tolerating diet. No nausea or vomiting. No further bleeding. Objective:    BP (!) 101/54   Pulse 71   Temp 97.9 °F (36.6 °C) (Oral)   Resp 20   Ht 5' 6\" (1.676 m)   Wt 203 lb 14.4 oz (92.5 kg)   SpO2 99%   BMI 32.91 kg/m²     Heart:  RRR, no murmurs, gallops, or rubs.   Lungs:  CTA bilaterally, no wheeze, rales or rhonchi  Abd: bowel sounds present, nontender, nondistended, no masses  Extrem:  No clubbing, cyanosis, or edema    CBC with Differential:    Lab Results   Component Value Date    WBC 6.0 12/06/2021    RBC 2.30 12/06/2021    HGB 7.4 12/06/2021    HCT 23.7 12/06/2021     12/06/2021    .0 12/06/2021    MCH 32.2 12/06/2021    MCHC 31.2 12/06/2021    RDW 14.3 12/06/2021    NRBC 0.9 05/25/2020    SEGSPCT 71 08/16/2013    BANDSPCT 1 03/29/2016    METASPCT 0.9 05/10/2020    LYMPHOPCT 21.6 12/06/2021    PROMYELOPCT 0.9 05/09/2020    MONOPCT 11.3 12/06/2021    MYELOPCT 1.7 05/11/2020    BASOPCT 1.0 12/06/2021    MONOSABS 0.80 12/06/2021    LYMPHSABS 1.53 12/06/2021    EOSABS 0.28 12/06/2021    BASOSABS 0.07 12/06/2021     Hemoglobin/Hematocrit:    Lab Results   Component Value Date    HGB 7.4 12/06/2021    HCT 23.7 12/06/2021     CMP:    Lab Results   Component Value Date     12/06/2021    K 5.3 12/06/2021    K 4.4 12/05/2021    CL 98 12/06/2021    CO2 27 12/06/2021    BUN 55 12/06/2021    CREATININE 7.4 12/06/2021    GFRAA 7 12/06/2021    LABGLOM 5 12/06/2021    GLUCOSE 189 12/06/2021    GLUCOSE 149 10/12/2011    PROT 6.5 12/05/2021    LABALBU 3.0 12/05/2021    LABALBU 4.1 10/12/2011    CALCIUM 8.9 12/06/2021    BILITOT <0.2 12/05/2021    ALKPHOS 108 12/05/2021    AST 12 12/05/2021    ALT 7 12/05/2021        Assessment:    Patient Active Problem List   Diagnosis    Neurologic gait dysfunction    Renal failure, acute on chronic ([address]    Diabetes mellitus ([address]    Hypertension    Arthritis    Kidney disease    Knee problem    Anemia due to stage 3 chronic kidney disease    Primary osteoarthritis of both knees    Acute on chronic renal insufficiency    Acute kidney injury superimposed on CKD (HCC)    Acute renal failure superimposed on chronic kidney disease, on chronic dialysis ([address]    2019 novel coronavirus disease (COVID-19)    Moderate protein-calorie malnutrition ([address]    PERLA (acute kidney injury) ([address]    End stage renal disease ([address]    Pressure injury of sacral region, stage 4 ([address]    Pressure injury of right hip, stage 3 ([address]       Plan:  Discharge        Yvrose Romeo MD  7:15 AM  12/7/2021

## 2022-01-04 ENCOUNTER — TELEPHONE (OUTPATIENT)
Dept: VASCULAR SURGERY | Age: 72
End: 2022-01-04

## 2022-01-05 ENCOUNTER — TELEPHONE (OUTPATIENT)
Dept: VASCULAR SURGERY | Age: 72
End: 2022-01-05

## 2022-01-05 ENCOUNTER — OFFICE VISIT (OUTPATIENT)
Dept: VASCULAR SURGERY | Age: 72
End: 2022-01-05

## 2022-01-05 VITALS — WEIGHT: 196 LBS | BODY MASS INDEX: 31.64 KG/M2 | DIASTOLIC BLOOD PRESSURE: 78 MMHG | SYSTOLIC BLOOD PRESSURE: 126 MMHG

## 2022-01-05 DIAGNOSIS — N18.6 ENCOUNTER REGARDING VASCULAR ACCESS FOR DIALYSIS FOR ESRD (HCC): Primary | ICD-10-CM

## 2022-01-05 DIAGNOSIS — Z99.2 ENCOUNTER REGARDING VASCULAR ACCESS FOR DIALYSIS FOR ESRD (HCC): Primary | ICD-10-CM

## 2022-01-05 PROCEDURE — 99024 POSTOP FOLLOW-UP VISIT: CPT | Performed by: NURSE PRACTITIONER

## 2022-01-05 NOTE — TELEPHONE ENCOUNTER
Christo Rendon at [facility] dialysis notified OK to access pt's AVF, call prn

## 2022-01-05 NOTE — PROGRESS NOTES
Vascular Surgery Outpatient Progress Note      Chief Complaint   Patient presents with    Post-Op Check     left arm       HISTORY OF PRESENT ILLNESS:                The patient is a 66 y.o. female who returns for follow-up evaluation of left arm arteriovenous fistula elevation. She has no hand pain or discomfort. She denies any new problems since she was last seen. Past Medical History:        Diagnosis Date    Anxiety and depression     Arthritis     COVID-19 05/2020    Diabetes mellitus ([address]     Dysphagia, oral phase     Hemodialysis patient ([address]     M-W-F    Hypertension     Hypertensive kidney disease with stage 4 chronic kidney disease ([address]     Kidney stones     Obesity     Sacral pressure ulcer 08/03/2021    stage 4    Unspecified osteoarthritis, unspecified site      Past Surgical History:        Procedure Laterality Date    ABDOMEN SURGERY N/A 5/4/2020    SACRAL WOUND DEBRIDEMENT CALL   WITH TIME AVAIL AM performed by Ayaz Hernandez MD at \[facility]"" 49  x3    CHOLECYSTECTOMY  3/31/16    Laparoscopic-Dr. Hermes Jones    CYSTOSCOPY  2011     for kidney stones    DIALYSIS FISTULA CREATION Left 8/5/2021    INSERTION FISTULA LEFT ARM performed by Ken Bolivar MD at [address] [place] [address] Right 11/18/2021    REVISION AV FISTULA LEFT ARM performed by Ken Bolivar MD at [address] [place] [address]  2009     right     UPPER GASTROINTESTINAL ENDOSCOPY  2.18.15    Dr. Argueta Face Findings: Mild Gastrits and Duodenitis, 2cm Hiatal Hernia    UPPER GASTROINTESTINAL ENDOSCOPY N/A 5/8/2020    EGD CONTROL HEMORRHAGE performed by Ayaz Hernandez MD at [address] [place] [address] 5/22/2020    EGD BEDSIDE performed by Ayush Sanz MD at [address] N/A 5/6/2021    INSERTION TUNNELED DIALYSIS CATHETER performed by Ken Bolivar MD at [facility] OR     Current Medications:   Prior to Admission medications Medication Sig Start Date End Date Taking? Authorizing Provider   linagliptin (TRADJENTA) 5 MG tablet Take 5 mg by mouth daily   Yes Historical Provider, MD   Ferric Citrate (AURYXIA) 1  MG(Fe) TABS Take 2 tablets by mouth Take with meals. Yes Historical Provider, MD   glucagon, rDNA, 1 MG injection once   Yes Historical Provider, MD   linaCLOtide (LINZESS) 72 MCG CAPS capsule Take 72 mcg by mouth every morning (before breakfast)   Yes Historical Provider, MD   midodrine (PROAMATINE) 5 MG tablet Take 5 mg by mouth three times a week M-W-F   Yes Historical Provider, MD   neomycin-bacitracin-polymyxin (NEOSPORIN) 5-400-5000 ointment Apply topically 4 times daily Apply topically 4 times daily.    Yes Historical Provider, MD   ondansetron (ZOFRAN) 4 MG tablet Take 4 mg by mouth every 8 hours as needed for Nausea or Vomiting   Yes Historical Provider, MD   bisacodyl (DULCOLAX) 5 MG EC tablet Take 10 mg by mouth daily as needed for Constipation   Yes Historical Provider, MD   docusate sodium (COLACE) 100 MG capsule Take 100 mg by mouth 2 times daily   Yes Historical Provider, MD   glucagon 1 MG injection Inject 1 kit into the muscle as needed (HYPOGLYCEMIA)   Yes Historical Provider, MD   insulin lispro (HUMALOG) 100 UNIT/ML injection vial Inject 10 Units into the skin 3 times daily (before meals)   Yes Historical Provider, MD   Vitamin D (CHOLECALCIFEROL) 50 MCG (2000 UT) TABS tablet Take 1 tablet by mouth daily 11/20/20  Yes Vivienne Smith MD   SITagliptin (JANUVIA) 25 MG tablet Take 25 mg by mouth daily   Yes Historical Provider, MD   magnesium hydroxide (MILK OF MAGNESIA) 400 MG/5ML suspension Take 30 mLs by mouth daily as needed for Constipation    Yes Historical Provider, MD   metoprolol succinate (TOPROL XL) 25 MG extended release tablet Take 1 tablet by mouth daily 10/12/20  Yes Gini Ho MD   escitalopram (LEXAPRO) 5 MG tablet Take 10 mg by mouth daily   Yes Historical Provider, MD   gabapentin (NEURONTIN) 300 MG capsule Take 300 mg by mouth 3 times daily. Yes Historical Provider, MD   insulin glargine (BASAGLAR KWIKPEN) 100 UNIT/ML injection pen Inject 10 Units into the skin nightly   Yes Historical Provider, MD   busPIRone (BUSPAR) 5 MG tablet Take 5 mg by mouth 3 times daily    Yes Historical Provider, MD   oxyCODONE-acetaminophen (PERCOCET) 7.5-325 MG per tablet Take 1 tablet by mouth every 8 hours as needed for Pain. Yes Historical Provider, MD   polyethylene glycol (GLYCOLAX) 17 g packet Take 17 g by mouth daily   Yes Historical Provider, MD   senna (SENOKOT) 8.6 MG tablet Take 2 tablets by mouth 2 times daily    Yes Historical Provider, MD   acetaminophen (TYLENOL) 325 MG tablet Take 650 mg by mouth every 6 hours as needed for Pain or Fever    Yes Historical Provider, MD     Allergies:  Patient has no known allergies. Social History     Socioeconomic History    Marital status:      Spouse name: Not on file    Number of children: Not on file    Years of education: Not on file    Highest education level: Not on file   Occupational History    Not on file   Tobacco Use    Smoking status: Former Smoker     Packs/day: 1.00     Years: 30.00     Pack years: 30.00     Quit date: 8/13/2011     Years since quitting: 10.4    Smokeless tobacco: Never Used   Vaping Use    Vaping Use: Never used   Substance and Sexual Activity    Alcohol use: No    Drug use: No    Sexual activity: Not Currently   Other Topics Concern    Not on file   Social History Narrative    Not on file     Social Determinants of Health     Financial Resource Strain:     Difficulty of Paying Living Expenses: Not on file   Food Insecurity:     Worried About Running Out of Food in the Last Year: Not on file    Demetrice of Food in the Last Year: Not on file   Transportation Needs:     Lack of Transportation (Medical): Not on file    Lack of Transportation (Non-Medical):  Not on file   Physical Activity:     Days of Exercise per Week: Not on file    Minutes of Exercise per Session: Not on file   Stress:     Feeling of Stress : Not on file   Social Connections:     Frequency of Communication with Friends and Family: Not on file    Frequency of Social Gatherings with Friends and Family: Not on file    Attends Gnosticist Services: Not on file    Active Member of [address] [place] [address] [employer] or Organizations: Not on file    Attends Club or Organization Meetings: Not on file    Marital Status: Not on file   Intimate Partner Violence:     Fear of Current or Ex-Partner: Not on file    Emotionally Abused: Not on file    Physically Abused: Not on file    Sexually Abused: Not on file   Housing Stability:     Unable to Pay for Housing in the Last Year: Not on file    Number of [place] in the Last Year: Not on file    Unstable Housing in the Last Year: Not on file        Family History   Problem Relation Age of Onset    Cancer Sister        REVIEW OF SYSTEMS:    Constitutional: Negative for activity change, appetite change, chills, diaphoresis, fatigue, fever and unexpected weight change. HEENT: Negative for congestion, ear discharge, ear pain, hearing loss, nosebleeds, rhinorrhea, sinus pain, sore throat, tinnitus, trouble swallowing and voice change. Eyes: Negative for photophobia, pain, discharge, redness, itching and visual disturbance. Respiratory: Negative for apnea, cough, chest tightness, shortness of breath and wheezing. Cardiovascular: Negative for chest pain, palpitations and leg swelling. Gastrointestinal: Negative for abdominal distention, abdominal pain, blood in stool, constipation, diarrhea, nausea and vomiting. Endocrine: Negative for cold intolerance, heat intolerance, polydipsia, polyphagia and polyuria. Genitourinary: Negative for decreased urine volume, difficulty urinating, dysuria, frequency, hematuria and urgency.    Musculoskeletal: Negative for arthralgias, back pain, gait problem, joint swelling CNP      No follow-ups on file.

## 2022-01-06 NOTE — DISCHARGE SUMMARY
Admit Date: 12/5/2021 11:23 AM   Discharge Date: 12/7/2021    Patient Active Problem List   Diagnosis    Neurologic gait dysfunction    [facility] failure[facility] on chronic [facility]    Diabetes mellitus ([facility]    Hypertension    Arthritis    Kidney disease    Knee problem    Anemia due to stage 3 chronic kidney disease    Primary osteoarthritis of both knees    Acute on chronic renal insufficiency    Acute kidney injury superimposed on CKD ([facility]    Acute renal failure superimposed on chronic kidney disease, on chronic dialysis ([facility]    2019 novel coronavirus disease (COVID-19)    Moderate protein-calorie malnutrition ([facility]    PERLA (acute kidney injury) ([facility]    End stage renal disease ([facility]    Pressure injury of sacral region, stage 4 ([facility]    Pressure injury of right hip, stage 3 ([facility]        Present on Admission:   (Resolved) Acute blood loss anemia   Pressure injury of sacral region, stage 4 ([facility]          Medication List      CONTINUE taking these medications    acetaminophen 325 MG tablet  Commonly known as: TYLENOL     Auryxia 1  MG(Fe) Tabs  Generic drug: Ferric Citrate     Basaglar KwikPen 100 UNIT/ML injection pen  Generic drug: insulin glargine     bisacodyl 5 MG EC tablet  Commonly known as: DULCOLAX     busPIRone 5 MG tablet  Commonly known as: BUSPAR     docusate sodium 100 MG capsule  Commonly known as: COLACE     escitalopram 5 MG tablet  Commonly known as: LEXAPRO     gabapentin 300 MG capsule  Commonly known as: NEURONTIN     glucagon (rDNA) 1 MG injection     glucagon 1 MG injection     insulin lispro 100 UNIT/ML injection vial  Commonly known as: HUMALOG     Linzess 72 MCG Caps capsule  Generic drug: linaCLOtide     magnesium hydroxide 400 MG/5ML suspension  Commonly known as: MILK OF MAGNESIA     metoprolol succinate 25 MG extended release tablet  Commonly known as: TOPROL XL  Take 1 tablet by mouth daily     midodrine 5 MG tablet  Commonly known as: PROAMATINE neomycin-bacitracin-polymyxin 5-400-5000 ointment     ondansetron 4 MG tablet  Commonly known as: ZOFRAN     oxyCODONE-acetaminophen 7.5-325 MG per tablet  Commonly known as: PERCOCET     polyethylene glycol 17 g packet  Commonly known as: GLYCOLAX     senna 8.6 MG tablet  Commonly known as: SENOKOT     SITagliptin 25 MG tablet  Commonly known as: JANUVIA     vitamin D 50 MCG (2000 UT) Tabs tablet  Commonly known as: CHOLECALCIFEROL  Take 1 tablet by mouth daily             Hospital Course/Procedures:66year-old with chronic renal failure on hemodialysis and a chronic sacral decubitus was admitted on 12/5/2021 due to bleeding from the decubitus and decreased hemoglobin. Skilled nursing facility stated she had a hemoglobin of 10 in November which dropped down to 7.8 prompting them to send her to the ER. Hemoglobin the next day was 8.2. Patient did have the presence of a stage IV sacral decubitus which was present on admission. No active bleeding was noted. Wound care was consulted for treatment recommendations. As stated there was no obvious bleeding from the decubitus and hemoglobin was stable therefore patient was discharged back to skilled nursing facility in stable condition on 12/7/2021    Consultants Following:nephrology for dialysis and wound care    Disposition:skilled nursing facility    Follow-up:she'll be followed by the facility physician.       Melina Tang MD  1/6/2022  12:51 PM

## 2022-02-07 ENCOUNTER — HOSPITAL ENCOUNTER (INPATIENT)
Age: 72
LOS: 3 days | Discharge: OTHER INSTITUTION | DRG: 308 | End: 2022-02-10
Attending: EMERGENCY MEDICINE | Admitting: FAMILY MEDICINE
Payer: COMMERCIAL

## 2022-02-07 ENCOUNTER — APPOINTMENT (OUTPATIENT)
Dept: GENERAL RADIOLOGY | Age: 72
DRG: 308 | End: 2022-02-07
Payer: COMMERCIAL

## 2022-02-07 DIAGNOSIS — R94.31 ABNORMAL ECG: ICD-10-CM

## 2022-02-07 DIAGNOSIS — R77.8 TROPONIN LEVEL ELEVATED: ICD-10-CM

## 2022-02-07 DIAGNOSIS — R55 SYNCOPE AND COLLAPSE: Primary | ICD-10-CM

## 2022-02-07 LAB
ALBUMIN SERPL-MCNC: 3.4 G/DL (ref 3.5–5.2)
ALP BLD-CCNC: 97 U/L (ref 35–104)
ALT SERPL-CCNC: 9 U/L (ref 0–32)
ANION GAP SERPL CALCULATED.3IONS-SCNC: 10 MMOL/L (ref 7–16)
AST SERPL-CCNC: 16 U/L (ref 0–31)
BASOPHILS ABSOLUTE: 0.05 E9/L (ref 0–0.2)
BASOPHILS RELATIVE PERCENT: 0.5 % (ref 0–2)
BILIRUB SERPL-MCNC: 0.4 MG/DL (ref 0–1.2)
BUN BLDV-MCNC: 21 MG/DL (ref 6–23)
CALCIUM SERPL-MCNC: 8.4 MG/DL (ref 8.6–10.2)
CHLORIDE BLD-SCNC: 100 MMOL/L (ref 98–107)
CK MB: 3.4 NG/ML (ref 0–4.3)
CK MB: 3.5 NG/ML (ref 0–4.3)
CO2: 29 MMOL/L (ref 22–29)
CREAT SERPL-MCNC: 4.2 MG/DL (ref 0.5–1)
EOSINOPHILS ABSOLUTE: 0.15 E9/L (ref 0.05–0.5)
EOSINOPHILS RELATIVE PERCENT: 1.6 % (ref 0–6)
GFR AFRICAN AMERICAN: 13
GFR NON-AFRICAN AMERICAN: 10 ML/MIN/1.73
GLUCOSE BLD-MCNC: 100 MG/DL (ref 74–99)
HCT VFR BLD CALC: 34 % (ref 34–48)
HEMOGLOBIN: 10.3 G/DL (ref 11.5–15.5)
IMMATURE GRANULOCYTES #: 0.05 E9/L
IMMATURE GRANULOCYTES %: 0.5 % (ref 0–5)
LYMPHOCYTES ABSOLUTE: 0.76 E9/L (ref 1.5–4)
LYMPHOCYTES RELATIVE PERCENT: 8.3 % (ref 20–42)
MCH RBC QN AUTO: 32.8 PG (ref 26–35)
MCHC RBC AUTO-ENTMCNC: 30.3 % (ref 32–34.5)
MCV RBC AUTO: 108.3 FL (ref 80–99.9)
METER GLUCOSE: 89 MG/DL (ref 74–99)
MONOCYTES ABSOLUTE: 0.83 E9/L (ref 0.1–0.95)
MONOCYTES RELATIVE PERCENT: 9 % (ref 2–12)
NEUTROPHILS ABSOLUTE: 7.37 E9/L (ref 1.8–7.3)
NEUTROPHILS RELATIVE PERCENT: 80.1 % (ref 43–80)
PDW BLD-RTO: 15.9 FL (ref 11.5–15)
PLATELET # BLD: 173 E9/L (ref 130–450)
PMV BLD AUTO: 10.1 FL (ref 7–12)
POTASSIUM REFLEX MAGNESIUM: 4.9 MMOL/L (ref 3.5–5)
PRO-BNP: ABNORMAL PG/ML (ref 0–125)
RBC # BLD: 3.14 E12/L (ref 3.5–5.5)
SODIUM BLD-SCNC: 139 MMOL/L (ref 132–146)
TOTAL CK: 24 U/L (ref 20–180)
TOTAL CK: 25 U/L (ref 20–180)
TOTAL PROTEIN: 6.8 G/DL (ref 6.4–8.3)
TROPONIN, HIGH SENSITIVITY: 107 NG/L (ref 0–9)
TROPONIN, HIGH SENSITIVITY: 127 NG/L (ref 0–9)
WBC # BLD: 9.2 E9/L (ref 4.5–11.5)

## 2022-02-07 PROCEDURE — 1200000000 HC SEMI PRIVATE

## 2022-02-07 PROCEDURE — 85025 COMPLETE CBC W/AUTO DIFF WBC: CPT

## 2022-02-07 PROCEDURE — 82550 ASSAY OF CK (CPK): CPT

## 2022-02-07 PROCEDURE — 93005 ELECTROCARDIOGRAM TRACING: CPT | Performed by: EMERGENCY MEDICINE

## 2022-02-07 PROCEDURE — 71045 X-RAY EXAM CHEST 1 VIEW: CPT

## 2022-02-07 PROCEDURE — 96372 THER/PROPH/DIAG INJ SC/IM: CPT

## 2022-02-07 PROCEDURE — 84484 ASSAY OF TROPONIN QUANT: CPT

## 2022-02-07 PROCEDURE — G0378 HOSPITAL OBSERVATION PER HR: HCPCS

## 2022-02-07 PROCEDURE — 2580000003 HC RX 258: Performed by: FAMILY MEDICINE

## 2022-02-07 PROCEDURE — 6360000002 HC RX W HCPCS: Performed by: FAMILY MEDICINE

## 2022-02-07 PROCEDURE — 80053 COMPREHEN METABOLIC PANEL: CPT

## 2022-02-07 PROCEDURE — 99223 1ST HOSP IP/OBS HIGH 75: CPT | Performed by: INTERNAL MEDICINE

## 2022-02-07 PROCEDURE — 99285 EMERGENCY DEPT VISIT HI MDM: CPT

## 2022-02-07 PROCEDURE — 83880 ASSAY OF NATRIURETIC PEPTIDE: CPT

## 2022-02-07 PROCEDURE — 93005 ELECTROCARDIOGRAM TRACING: CPT | Performed by: GENERAL PRACTICE

## 2022-02-07 PROCEDURE — 82553 CREATINE MB FRACTION: CPT

## 2022-02-07 PROCEDURE — 82962 GLUCOSE BLOOD TEST: CPT

## 2022-02-07 PROCEDURE — 6370000000 HC RX 637 (ALT 250 FOR IP): Performed by: FAMILY MEDICINE

## 2022-02-07 RX ORDER — ONDANSETRON 4 MG/1
4 TABLET, ORALLY DISINTEGRATING ORAL EVERY 4 HOURS PRN
Status: ON HOLD | COMMUNITY
End: 2022-07-12 | Stop reason: HOSPADM

## 2022-02-07 RX ORDER — OXYCODONE HYDROCHLORIDE AND ACETAMINOPHEN 5; 325 MG/1; MG/1
1 TABLET ORAL EVERY 8 HOURS PRN
Status: DISCONTINUED | OUTPATIENT
Start: 2022-02-07 | End: 2022-02-10 | Stop reason: HOSPADM

## 2022-02-07 RX ORDER — MIDODRINE HYDROCHLORIDE 5 MG/1
15 TABLET ORAL
Status: DISCONTINUED | OUTPATIENT
Start: 2022-02-07 | End: 2022-02-10 | Stop reason: HOSPADM

## 2022-02-07 RX ORDER — ACETAMINOPHEN 325 MG/1
650 TABLET ORAL EVERY 6 HOURS PRN
Status: DISCONTINUED | OUTPATIENT
Start: 2022-02-07 | End: 2022-02-10 | Stop reason: HOSPADM

## 2022-02-07 RX ORDER — ONDANSETRON 4 MG/1
4 TABLET, ORALLY DISINTEGRATING ORAL EVERY 4 HOURS PRN
Status: DISCONTINUED | OUTPATIENT
Start: 2022-02-07 | End: 2022-02-09

## 2022-02-07 RX ORDER — OXYCODONE HYDROCHLORIDE 5 MG/1
2.5 TABLET ORAL EVERY 8 HOURS PRN
Status: DISCONTINUED | OUTPATIENT
Start: 2022-02-07 | End: 2022-02-10 | Stop reason: HOSPADM

## 2022-02-07 RX ORDER — INSULIN GLARGINE 100 [IU]/ML
10 INJECTION, SOLUTION SUBCUTANEOUS NIGHTLY
Status: DISCONTINUED | OUTPATIENT
Start: 2022-02-07 | End: 2022-02-10 | Stop reason: HOSPADM

## 2022-02-07 RX ORDER — CHOLECALCIFEROL (VITAMIN D3) 50 MCG
2000 TABLET ORAL DAILY
Status: DISCONTINUED | OUTPATIENT
Start: 2022-02-08 | End: 2022-02-10 | Stop reason: HOSPADM

## 2022-02-07 RX ORDER — NICOTINE POLACRILEX 4 MG
15 LOZENGE BUCCAL PRN
Status: DISCONTINUED | OUTPATIENT
Start: 2022-02-07 | End: 2022-02-10 | Stop reason: HOSPADM

## 2022-02-07 RX ORDER — DEXTROSE MONOHYDRATE 25 G/50ML
12.5 INJECTION, SOLUTION INTRAVENOUS PRN
Status: DISCONTINUED | OUTPATIENT
Start: 2022-02-07 | End: 2022-02-10 | Stop reason: HOSPADM

## 2022-02-07 RX ORDER — MIDODRINE HYDROCHLORIDE 5 MG/1
5 TABLET ORAL PRN
Status: ON HOLD | COMMUNITY
End: 2022-07-12 | Stop reason: HOSPADM

## 2022-02-07 RX ORDER — SODIUM CHLORIDE 9 MG/ML
25 INJECTION, SOLUTION INTRAVENOUS PRN
Status: DISCONTINUED | OUTPATIENT
Start: 2022-02-07 | End: 2022-02-10 | Stop reason: HOSPADM

## 2022-02-07 RX ORDER — ACETAMINOPHEN 500 MG
1000 TABLET ORAL EVERY 8 HOURS PRN
Status: ON HOLD | COMMUNITY

## 2022-02-07 RX ORDER — TRAZODONE HYDROCHLORIDE 50 MG/1
25 TABLET ORAL NIGHTLY PRN
Status: ON HOLD | COMMUNITY

## 2022-02-07 RX ORDER — SODIUM CHLORIDE 0.9 % (FLUSH) 0.9 %
5-40 SYRINGE (ML) INJECTION EVERY 12 HOURS SCHEDULED
Status: DISCONTINUED | OUTPATIENT
Start: 2022-02-07 | End: 2022-02-10 | Stop reason: HOSPADM

## 2022-02-07 RX ORDER — ALOGLIPTIN 6.25 MG/1
6.25 TABLET, FILM COATED ORAL DAILY
Status: DISCONTINUED | OUTPATIENT
Start: 2022-02-08 | End: 2022-02-10 | Stop reason: HOSPADM

## 2022-02-07 RX ORDER — ALBUTEROL SULFATE 2.5 MG/3ML
2.5 SOLUTION RESPIRATORY (INHALATION) EVERY 4 HOURS PRN
Status: DISCONTINUED | OUTPATIENT
Start: 2022-02-07 | End: 2022-02-10 | Stop reason: HOSPADM

## 2022-02-07 RX ORDER — TRAZODONE HYDROCHLORIDE 50 MG/1
25 TABLET ORAL NIGHTLY
Status: DISCONTINUED | OUTPATIENT
Start: 2022-02-07 | End: 2022-02-10 | Stop reason: HOSPADM

## 2022-02-07 RX ORDER — SENNA PLUS 8.6 MG/1
2 TABLET ORAL 2 TIMES DAILY
Status: DISCONTINUED | OUTPATIENT
Start: 2022-02-07 | End: 2022-02-10 | Stop reason: HOSPADM

## 2022-02-07 RX ORDER — MIDODRINE HYDROCHLORIDE 5 MG/1
5 TABLET ORAL PRN
Status: DISCONTINUED | OUTPATIENT
Start: 2022-02-07 | End: 2022-02-10 | Stop reason: HOSPADM

## 2022-02-07 RX ORDER — ACETAMINOPHEN 650 MG/1
650 SUPPOSITORY RECTAL EVERY 6 HOURS PRN
Status: DISCONTINUED | OUTPATIENT
Start: 2022-02-07 | End: 2022-02-10 | Stop reason: HOSPADM

## 2022-02-07 RX ORDER — ESCITALOPRAM OXALATE 10 MG/1
10 TABLET ORAL DAILY
Status: DISCONTINUED | OUTPATIENT
Start: 2022-02-08 | End: 2022-02-10 | Stop reason: HOSPADM

## 2022-02-07 RX ORDER — BUSPIRONE HYDROCHLORIDE 5 MG/1
5 TABLET ORAL NIGHTLY
Status: DISCONTINUED | OUTPATIENT
Start: 2022-02-07 | End: 2022-02-10 | Stop reason: HOSPADM

## 2022-02-07 RX ORDER — ALBUTEROL SULFATE 90 UG/1
2 AEROSOL, METERED RESPIRATORY (INHALATION) EVERY 4 HOURS PRN
Status: DISCONTINUED | OUTPATIENT
Start: 2022-02-07 | End: 2022-02-07 | Stop reason: CLARIF

## 2022-02-07 RX ORDER — BUSPIRONE HYDROCHLORIDE 10 MG/1
10 TABLET ORAL 2 TIMES DAILY
Status: ON HOLD | COMMUNITY

## 2022-02-07 RX ORDER — ALBUTEROL SULFATE 90 UG/1
2 AEROSOL, METERED RESPIRATORY (INHALATION) EVERY 4 HOURS PRN
Status: ON HOLD | COMMUNITY
End: 2022-04-14 | Stop reason: HOSPADM

## 2022-02-07 RX ORDER — METOPROLOL SUCCINATE 25 MG/1
25 TABLET, EXTENDED RELEASE ORAL DAILY
Status: DISCONTINUED | OUTPATIENT
Start: 2022-02-08 | End: 2022-02-10 | Stop reason: HOSPADM

## 2022-02-07 RX ORDER — DOCUSATE SODIUM 100 MG/1
100 CAPSULE, LIQUID FILLED ORAL 2 TIMES DAILY
Status: DISCONTINUED | OUTPATIENT
Start: 2022-02-07 | End: 2022-02-10 | Stop reason: HOSPADM

## 2022-02-07 RX ORDER — POLYETHYLENE GLYCOL 3350 17 G/17G
17 POWDER, FOR SOLUTION ORAL DAILY
Status: DISCONTINUED | OUTPATIENT
Start: 2022-02-08 | End: 2022-02-10 | Stop reason: HOSPADM

## 2022-02-07 RX ORDER — OXYCODONE AND ACETAMINOPHEN 7.5; 325 MG/1; MG/1
1 TABLET ORAL EVERY 8 HOURS PRN
Status: DISCONTINUED | OUTPATIENT
Start: 2022-02-07 | End: 2022-02-07 | Stop reason: CLARIF

## 2022-02-07 RX ORDER — DEXTROSE MONOHYDRATE 50 MG/ML
100 INJECTION, SOLUTION INTRAVENOUS PRN
Status: DISCONTINUED | OUTPATIENT
Start: 2022-02-07 | End: 2022-02-10 | Stop reason: HOSPADM

## 2022-02-07 RX ORDER — GABAPENTIN 300 MG/1
300 CAPSULE ORAL 3 TIMES DAILY
Status: DISCONTINUED | OUTPATIENT
Start: 2022-02-07 | End: 2022-02-08

## 2022-02-07 RX ORDER — SODIUM CHLORIDE 0.9 % (FLUSH) 0.9 %
5-40 SYRINGE (ML) INJECTION PRN
Status: DISCONTINUED | OUTPATIENT
Start: 2022-02-07 | End: 2022-02-10 | Stop reason: HOSPADM

## 2022-02-07 RX ORDER — BUSPIRONE HYDROCHLORIDE 10 MG/1
10 TABLET ORAL 2 TIMES DAILY
Status: DISCONTINUED | OUTPATIENT
Start: 2022-02-07 | End: 2022-02-10 | Stop reason: HOSPADM

## 2022-02-07 RX ORDER — HEPARIN SODIUM 10000 [USP'U]/ML
5000 INJECTION, SOLUTION INTRAVENOUS; SUBCUTANEOUS EVERY 8 HOURS SCHEDULED
Status: DISCONTINUED | OUTPATIENT
Start: 2022-02-07 | End: 2022-02-10 | Stop reason: HOSPADM

## 2022-02-07 RX ADMIN — MIDODRINE HYDROCHLORIDE 15 MG: 5 TABLET ORAL at 23:15

## 2022-02-07 RX ADMIN — Medication 10 ML: at 23:08

## 2022-02-07 RX ADMIN — TRAZODONE HYDROCHLORIDE 25 MG: 50 TABLET ORAL at 23:15

## 2022-02-07 RX ADMIN — HEPARIN SODIUM 5000 UNITS: 10000 INJECTION INTRAVENOUS; SUBCUTANEOUS at 23:16

## 2022-02-07 ASSESSMENT — ENCOUNTER SYMPTOMS
NAUSEA: 0
BACK PAIN: 0
VOMITING: 0
EYE PAIN: 0
WHEEZING: 0
SORE THROAT: 0
ABDOMINAL DISTENTION: 0
SHORTNESS OF BREATH: 0
COUGH: 0
EYE DISCHARGE: 0
EYE REDNESS: 0
SINUS PRESSURE: 0
DIARRHEA: 0

## 2022-02-07 ASSESSMENT — PAIN SCALES - GENERAL: PAINLEVEL_OUTOF10: 0

## 2022-02-07 NOTE — CONSULTS
The above documentation has been prepared under my direction and personally reviewed by me in its entirety. I confirm that the note above accurately reflects all work, treatment, procedures, and medical decision making performed by me. The patient's history was independently obtained. The patient was independently examined. Electrocardiogram, prior and present cardiovascular assessment, and laboratory studies were reviewed. Diagnostic Assessment and Plan: Thank you for allowing me to participate in your patient's care. Please feel free to contact me if you have any questions or concerns. Melly Benavides. Jesika Pham, [address] [place] [address]. Cardiology Consultation      Reason for Consult:  Abnormal EKG     Consulting Physician: Dr Jesika Pham     Requesting Physician:  Dr César Khan    Date of Consultation: 2/7/2022    HISTORY OF PRESENT ILLNESS:   Ms Dulce Maria Rowley is a 67 yo female who was seen in initial consultation with Dr Stephon Nunes 2/2020, previously followed with Dr Jaqueline Velez. Past medical history includes chronic LBBB,  hypertension, type 2 diabetes mellitus, arthritis, depression, anemia of chronic disease, end stage renal disease on HD (MWF), chronic sacral decubitus wounds, nursing home resident,     Presented to [facility] 2/7/2022 from dialysis with reported LOC   VS: 97.6-69-16-97%RA-140/61   Labs:  WBC 9.2, H&H 10.3/34.0, plt 173. K+ 4.9, BUN/Scr 21/4.2, glucose 100  Troponin 127   proBNP > 60297    CXR mild cardiomegaly. Patient was reportedly brought into the ED for LOC at dialysis. She was reportedly receiving dialysis and had one hour left when she LOC. She was reported to have no pulse but no CPR was performed. Patient denies any knowledge of these events. She denies any chest pain, shortness of breath, palpitations, orthopnea, PND. Please note: past medical records were reviewed per electronic medical record (EMR) - see detailed reports under Past Medical/ Surgical History. Past Medical and Surgical History:    1. Arthritis  2. Hypertension  3. Diabetes mellitus --peripheral neuropathy (previously on metformin/discontinued for unknown reasons)  4. Depression  5. Obesity: Weight 258 pounds  6. History of \"pulmonary sarcoidosis\" per chart -- (further details are unknown  7. Essential tremor  8. TTE: 4/29/2019 EF 60%, mild LVH, indeterminate diastolic function, severe mitral annular calcification (posterior), trace MR, MV mean gradient 7.5 mmHg, mild TR. 9. Denies prior cardiac catheterization or stress testing. 10. cLBBB  11. Former smoker: Quit over 12 years ago (0.5 PPD x15 years)  15. History of cholecystectomy, right foot surgery  13. History of EGD 2/2015 showing mild gastritis and duodenitis, 2 cm hiatal hernia. 14. Anemia of chronic disease: Baseline hemoglobin ~ 9  15. ESRD on HD  (MWF)     Medications Prior to admit:  Prior to Admission medications    Medication Sig Start Date End Date Taking? Authorizing Provider   linagliptin (TRADJENTA) 5 MG tablet Take 5 mg by mouth daily    Historical Provider, MD   Ferric Citrate (AURYXIA) 1  MG(Fe) TABS Take 2 tablets by mouth Take with meals. Historical Provider, MD   glucagon, rDNA, 1 MG injection once    Historical Provider, MD   linaCLOtide (LINZESS) 72 MCG CAPS capsule Take 72 mcg by mouth every morning (before breakfast)    Historical Provider, MD   midodrine (PROAMATINE) 5 MG tablet Take 5 mg by mouth three times a week M-W-F    Historical Provider, MD   neomycin-bacitracin-polymyxin (NEOSPORIN) 5-400-5000 ointment Apply topically 4 times daily Apply topically 4 times daily.     Historical Provider, MD   ondansetron (ZOFRAN) 4 MG tablet Take 4 mg by mouth every 8 hours as needed for Nausea or Vomiting    Historical Provider, MD   bisacodyl (DULCOLAX) 5 MG EC tablet Take 10 mg by mouth daily as needed for Constipation    Historical Provider, MD   docusate sodium (COLACE) 100 MG capsule Take 100 mg by mouth 2 times daily    Historical Provider, MD   glucagon 1 MG injection Inject 1 kit into the muscle as needed (HYPOGLYCEMIA)    Historical Provider, MD   insulin lispro (HUMALOG) 100 UNIT/ML injection vial Inject 10 Units into the skin 3 times daily (before meals)    Historical Provider, MD   Vitamin D (CHOLECALCIFEROL) 50 MCG (2000 UT) TABS tablet Take 1 tablet by mouth daily 11/20/20   Ladonna Schlatter, MD   SITagliptin (JANUVIA) 25 MG tablet Take 25 mg by mouth daily    Historical Provider, MD   magnesium hydroxide (MILK OF MAGNESIA) 400 MG/5ML suspension Take 30 mLs by mouth daily as needed for Constipation     Historical Provider, MD   metoprolol succinate (TOPROL XL) 25 MG extended release tablet Take 1 tablet by mouth daily 10/12/20   Fany Zavaleta MD   escitalopram (LEXAPRO) 5 MG tablet Take 10 mg by mouth daily    Historical Provider, MD   gabapentin (NEURONTIN) 300 MG capsule Take 300 mg by mouth 3 times daily. Historical Provider, MD   insulin glargine (BASAGLAR KWIKPEN) 100 UNIT/ML injection pen Inject 10 Units into the skin nightly    Historical Provider, MD   busPIRone (BUSPAR) 5 MG tablet Take 5 mg by mouth 3 times daily     Historical Provider, MD   oxyCODONE-acetaminophen (PERCOCET) 7.5-325 MG per tablet Take 1 tablet by mouth every 8 hours as needed for Pain. Historical Provider, MD   polyethylene glycol (GLYCOLAX) 17 g packet Take 17 g by mouth daily    Historical Provider, MD   senna (SENOKOT) 8.6 MG tablet Take 2 tablets by mouth 2 times daily     Historical Provider, MD   acetaminophen (TYLENOL) 325 MG tablet Take 650 mg by mouth every 6 hours as needed for Pain or Fever     Historical Provider, MD       Current Medications:    No current facility-administered medications for this encounter. Allergies:  Patient has no known allergies. Social History:    Resident at [facility] - currently non ambulatory with wheelchair / bed bound. Does not require supplemental O2. Denies CP/SOB with ADL. Previous tobacco abuse   Denies alcohol or ilicit drug use  Full code         Family History: This information was not obtained at this time as it is found noncontributory secondary to the patients advanced age. REVIEW OF SYSTEMS:     · Constitutional: Denies fevers, chills, night sweats, and fatigue  · HEENT: Denies headaches, nose bleeds, and blurred vision,oral pain, abscess or lesion. · Musculoskeletal: Denies falls, pain to BLE with ambulation and edema to BLE. · Neurological: Denies dizziness and lightheadedness, numbness and tingling  · Cardiovascular: Denies chest pain, palpitations, and feelings of heart racing. · Respiratory: Denies orthopnea and PND, cough, NAVARRO  · Gastrointestinal: Denies heartburn, nausea/vomiting, diarrhea and constipation, black/bloody, and tarry stools. · Genitourinary: Denies dysuria and hematuria  · Hematologic: Denies excessive bruising or bleeding  · Lymphatic: Denies lumps and bumps to neck, axilla, breast, and groin      PHYSICAL EXAM:   /60   Pulse 62   Temp 97.6 °F (36.4 °C)   Resp 14   Ht 5' 1\" (1.549 m)   Wt 190 lb (86.2 kg)   SpO2 99%   BMI 35.90 kg/m²   CONST:  Well developed, elderly  female who appears her stated age. Awake, alert, cooperative, no apparent distress  HEENT:   Head- Normocephalic, atraumatic   Eyes- Conjunctivae pink, anicteric  Throat- Oral mucosa pink and moist  Neck-  No stridor, trachea midline, no jugular venous distention. CHEST: Chest symmetrical and non-tender to palpation. RESPIRATORY: Lung sounds clear throughout fields bilaterally. No wheeze or rhonchi noted. CARDIOVASCULAR:     No carotid bruit noted bilaterally   Heart Ausculation- Regular rate and rhythm, no murmur. PV: No lower extremity edema. No varicosities. ABDOMEN: Soft, non-tender to light palpation. Bowel sounds present. MS: Good muscle strength and tone. No atrophy or abnormal movements.    : Deferred   SKIN: Warm and dry no statis dermatitis or ulcers   NEURO / PSYCH: Oriented to person, place and time. Speech clear and appropriate. Follows all commands. Pleasant affect     DATA:    ECG: SR T wave inversions in anterolateral leads   Tele strips:  SR   Diagnostic:        Labs:   CBC:   Recent Labs     02/07/22  1156   WBC 9.2   HGB 10.3*   HCT 34.0        BMP:   Recent Labs     02/07/22  1156      K 4.9   CO2 29   BUN 21   CREATININE 4.2*   LABGLOM 10   CALCIUM 8.4*     HgA1c:   Lab Results   Component Value Date    LABA1C 6.9 (H) 05/18/2020       FASTING LIPID PANEL:  Lab Results   Component Value Date    CHOL 191 10/12/2011    HDL 37.0 10/12/2011    LDLCALC 118 10/12/2011    TRIG 181 10/12/2011     LIVER PROFILE:  Recent Labs     02/07/22  1156   AST 16   ALT 9   LABALBU 3.4*       Assessment and plan per Dr Vijaya Siddiqui. Electronically signed by [place], [place] on 2/7/2022 at 2:29 PM     The above documentation has been prepared under my direction and personally reviewed by me in its entirety. I confirm that the note above accurately reflects all work, treatment, procedures, and medical decision making performed by me. The patient's history was independently obtained. The patient was independently examined. Electrocardiogram, prior and present cardiovascular assessment, and laboratory studies were reviewed. The patient is a 79-year-old white female known to [facility] with primary cardiovascular care provided by [facility].   She was initially evaluated due to a chronically elevated proBNP level in the absence of clinical volume overload in the face of a known risk profile of hypertension, diabetes with associated stage V chronic kidney disease and maintenance dialysis, hyperlipidemia and morbid obesity as well as that of a chronically noted left bundle branch block and in April, 2019 underwent an echocardiogram demonstrating evidence of a normal-sized left ventricular chamber with mild concentric left ventricular hypertrophy and normal left ventricular systolic function with severe mitral annular calcification and insignificant mitral regurgitation and with transmitral gradients of approximately 8 mmHg consistent with that of moderate functional mitral stenosis. The patient resides in an extended care facility and is otherwise relatively sedentary with functional capabilities of approximately 4 METs and denies active cardiovascular symptoms. On the day of hospitalization, she was undergoing dialysis when in the course of her therapy she was apparently unresponsive with reports of syncope with a \"loss of pulse\" but no requirements of cardiopulmonary resuscitation and the patient relating having not slept the prior night and feeling that she had fallen asleep. At the time of her emergency room assessment, a resting electrocardiogram reviewed at the time of her evaluation suggested evidence of sinus rhythm with left axis deviation and an intraventricular conduction delay consistent with a pattern of a left bundle branch block but presently diffuse anterior T wave inversions additionally potentially compatible with that of an age undetermined anterior infarct and a subsequent tracing demonstrating no progressive evolution. A chest x-ray again reviewed demonstrated no evidence of significant cardiomegaly and no infiltrate. Additional laboratory study demonstrated evidence of her chronic kidney disease with elevation of a high-sensitivity troponin and significant elevation of proBNP levels with a subsequent lack of significant change of a follow-up troponin and normal CK and myocardial index. The patient otherwise relates no symptoms of decompensated left ventricular systolic dysfunction or volume overload nor arrhythmia related manifestations. At the time of evaluation, the patient's medications and allergies were reviewed as well as that of her past medical history and review of systems as documented.     On examination, she is presently resting comfortably and is in no discomfort nor distress. She is hemodynamically stable vital signs as documented. Jugular venous pressure is somewhat difficult to assess based on body habitus but appears grossly normal with no identified carotid bruits. Lung fields are clear to auscultation. Cardiac examination is notable for a regular rate and rhythm with a paradoxically split second heart sound and no gallop rhythm or cardiac murmur. Abdominal obesity is present with no significant edema noted. Diagnostic Assessment and Plan: On a clinical basis, the patient presents following an apparent unresponsive episode of uncertain origin in the face of chronic electrocardiographic abnormalities with that of a left bundle branch block conduction pattern and present tracing additionally potentially compatible with that of an age undetermined and potentially evolving anteroseptal infarct. At the time of assessment, this is been discussed with the patient and in the absence of an acute coronary syndrome on the basis of concomitant cardiac biomarkers, particularly that of her CK and CK-MB levels with high-sensitivity troponin levels affected by her chronic kidney disease. An echocardiogram will be obtained to assess the presence or absence of regional wall motion of base compatible with this to guide additional management recommendations including the potential indication for ischemic assessment. Independent of her findings, ongoing appropriate lifestyle modification to achieve weight reduction will benefit diastolic cardiac performance and assist in management of obstructive sleep apnea in attempt to reduce risk of further adverse cardiovascular effects in addition to that of aggressive risk factor modification of blood pressure, diabetes and serum lipids to reduce risk of future atherosclerotic development. Thank you for allowing me to participate in your patient's care.  Please feel free to contact me if you have any questions or concerns. Carey Zazueta.  Kira Root, [address] [facility]

## 2022-02-07 NOTE — CONSULTS
Nephrology Consult Note  Patient's Name: Rick Lebron  5:06 PM  2/7/2022    Nephrologist: Esperanza Gonzalez    Reason for Consult:  ESKD  Requesting Physician:  Noman Weaver MD    Chief Complaint:  Bleeding from the L Hip    History Obtained From:  patient and past medical records    History of Present Ilness:    Rick Lebron is a 66 y.o. female with prior history ESKD receiving IHD on an MWF schedule. She was sent in from the Dialysis. Pt states she felt like she had to go to sleep and the next thing she remembers is waking up and EMS was at dialysis taking care of her. She denies CP or SOB. I spoke with the RN at outpt dialysis and she stated the pt had a decreased LOC and when checked her initial HR was 27. She was bolused with IVF apprx 1L 0.9NS  And EMS was called. Her HR was back. She is on metoprolol as an outpt. She had 3 of the 4hrs of her dialysis treatment      Past Medical History:   Diagnosis Date    Anemia in chronic kidney disease     Anxiety and depression     Arthritis     Chronic pain syndrome     COVID-19 05/2020    COVID-19     Diabetes mellitus ([facility]     Dysphagia, oral phase     GERD (gastroesophageal reflux disease)     Hemodialysis patient ([facility]     M-W-F    Hyperkalemia     Hypertension     Hypertensive kidney disease with stage 4 chronic kidney disease (HCC)     Insomnia     Kidney stones     MDD (major depressive disorder)     Moderate protein-calorie malnutrition (HCC)     Morbid obesity (HCC)     Muscle weakness (generalized)     Obesity     Pressure ulcer of sacral region     Sacral pressure ulcer 08/03/2021    stage 4    Unspecified osteoarthritis, unspecified site     Weakness generalized        Past Surgical History:   Procedure Laterality Date    ABDOMEN SURGERY N/A 5/4/2020    SACRAL WOUND DEBRIDEMENT CALL   WITH TIME AVAIL AM performed by Afia Seth MD at [address]  x3   [facility] [place] [facility] [place]  [facility] [place]  3/31/16    Laparoscopic-Dr. Ange Pacheco CYSTOSCOPY  2011     for kidney stones    DIALYSIS FISTULA CREATION Left 8/5/2021    INSERTION FISTULA LEFT ARM performed by Tia Latham MD at [address] [facility] Right 11/18/2021    REVISION AV FISTULA LEFT ARM performed by Tia Latham MD at [address]  2009     right     UPPER GASTROINTESTINAL ENDOSCOPY  2.18.15    Dr. Kevin oFy Findings: Mild Gastrits and Duodenitis, 2cm Hiatal Hernia    UPPER GASTROINTESTINAL ENDOSCOPY N/A 5/8/2020    EGD CONTROL HEMORRHAGE performed by Harper Valadez MD at [address] N/A 5/22/2020    EGD BEDSIDE performed by Myriam Fam MD at [address] N/A 5/6/2021    INSERTION TUNNELED DIALYSIS CATHETER performed by Tia Latham MD at [facility] OR       Family History   Problem Relation Age of Onset    Cancer Sister         reports that she quit smoking about 10 years ago. She has a 30.00 pack-year smoking history. She has never used smokeless tobacco. She reports that she does not drink alcohol and does not use drugs. Allergies:  Patient has no known allergies. Current Medications:    perflutren lipid microspheres (DEFINITY) injection 1.65 mg, ONCE PRN        Review of Systems:   Pertinent items are noted in HPI.     Physical exam:   Constitutional:  Elderly female in NAD  Vitals:   VITALS:  /72   Pulse 60   Temp 97.8 °F (36.6 °C)   Resp 14   Ht 5' 1\" (1.549 m)   Wt 190 lb (86.2 kg)   SpO2 98%   BMI 35.90 kg/m²   24HR INTAKE/OUTPUT:  No intake or output data in the 24 hours ending 02/07/22 1706  URINARY CATHETER OUTPUT ([place]):     DRAIN/TUBE OUTPUT:     VENT SETTINGS:  Vent Information  SpO2: 98 %  Additional Respiratory  Assessments  Pulse: 60  Resp: 14  SpO2: 98 %    Skin: no rash, turgor wnl  Heent:  eomi, mmm  Neck: no bruits or jvd noted, R IJ TDC  Cardiovascular:  S1, S2 without m/r/g  Respiratory: decreased BS at the bases  Abdomen:  +bs, soft, nt, nd  Ext: 1+ bilat lower extremity edema  Psychiatric: mood and affect appropriate    Data:   Labs:  CBC:   Lab Results   Component Value Date    WBC 9.2 02/07/2022    RBC 3.14 02/07/2022    HGB 10.3 02/07/2022    HCT 34.0 02/07/2022    .3 02/07/2022    MCH 32.8 02/07/2022    MCHC 30.3 02/07/2022    RDW 15.9 02/07/2022     02/07/2022    MPV 10.1 02/07/2022     CBC with Differential:    Lab Results   Component Value Date    WBC 9.2 02/07/2022    RBC 3.14 02/07/2022    HGB 10.3 02/07/2022    HCT 34.0 02/07/2022     02/07/2022    .3 02/07/2022    MCH 32.8 02/07/2022    MCHC 30.3 02/07/2022    RDW 15.9 02/07/2022    NRBC 0.9 05/25/2020    SEGSPCT 71 08/16/2013    BANDSPCT 1 03/29/2016    METASPCT 0.9 05/10/2020    LYMPHOPCT 8.3 02/07/2022    PROMYELOPCT 0.9 05/09/2020    MONOPCT 9.0 02/07/2022    MYELOPCT 1.7 05/11/2020    BASOPCT 0.5 02/07/2022    MONOSABS 0.83 02/07/2022    LYMPHSABS 0.76 02/07/2022    EOSABS 0.15 02/07/2022    BASOSABS 0.05 02/07/2022     Hemoglobin/Hematocrit:    Lab Results   Component Value Date    HGB 10.3 02/07/2022    HCT 34.0 02/07/2022     CMP:    Lab Results   Component Value Date     02/07/2022    K 4.9 02/07/2022     02/07/2022    CO2 29 02/07/2022    BUN 21 02/07/2022    CREATININE 4.2 02/07/2022    GFRAA 13 02/07/2022    LABGLOM 10 02/07/2022    GLUCOSE 100 02/07/2022    GLUCOSE 149 10/12/2011    PROT 6.8 02/07/2022    LABALBU 3.4 02/07/2022    LABALBU 4.1 10/12/2011    CALCIUM 8.4 02/07/2022    BILITOT 0.4 02/07/2022    ALKPHOS 97 02/07/2022    AST 16 02/07/2022    ALT 9 02/07/2022     BMP:    Lab Results   Component Value Date     02/07/2022    K 4.9 02/07/2022     02/07/2022    CO2 29 02/07/2022    BUN 21 02/07/2022    LABALBU 3.4 02/07/2022    LABALBU 4.1 10/12/2011    CREATININE 4.2 02/07/2022    CALCIUM 8.4 02/07/2022    GFRAA 13 02/07/2022    LABGLOM 10 02/07/2022    GLUCOSE 100 02/07/2022    GLUCOSE 149 10/12/2011 BUN/Creatinine:    Lab Results   Component Value Date    BUN 21 02/07/2022    CREATININE 4.2 02/07/2022     Hepatic Function Panel:    Lab Results   Component Value Date    ALKPHOS 97 02/07/2022    ALT 9 02/07/2022    AST 16 02/07/2022    PROT 6.8 02/07/2022    BILITOT 0.4 02/07/2022    BILIDIR <0.2 01/07/2021    IBILI see below 01/07/2021    LABALBU 3.4 02/07/2022    LABALBU 4.1 10/12/2011     Albumin:    Lab Results   Component Value Date    LABALBU 3.4 02/07/2022    LABALBU 4.1 10/12/2011     Calcium:    Lab Results   Component Value Date    CALCIUM 8.4 02/07/2022     Ionized Calcium:    Lab Results   Component Value Date    IONCA 1.59 06/26/2013     Magnesium:    Lab Results   Component Value Date    MG 2.5 12/07/2021     Phosphorus:    Lab Results   Component Value Date    PHOS 4.0 12/07/2021     LDH:    Lab Results   Component Value Date     05/24/2020     Uric Acid:    Lab Results   Component Value Date    LABURIC 7.7 07/19/2019    URICACID 11.6 10/12/2011     PT/INR:    Lab Results   Component Value Date    PROTIME 10.8 12/05/2021    PROTIME 15.3 06/25/2011    INR 1.0 12/05/2021     PTT:    Lab Results   Component Value Date    APTT 28.3 12/05/2021   [APTT}  Troponin:    Lab Results   Component Value Date    TROPONINI 0.09 10/09/2020     U/A:    Lab Results   Component Value Date    COLORU Yellow 05/03/2021    PROTEINU >=300 05/03/2021    PHUR 7.0 05/03/2021    WBCUA PACKED 05/03/2021    WBCUA 5-10 08/17/2011    RBCUA 0-1 05/03/2021    RBCUA 0-1 06/26/2013    YEAST Present 08/11/2020    BACTERIA MODERATE 05/03/2021    CLARITYU Clear 05/03/2021    SPECGRAV 1.020 05/03/2021    LEUKOCYTESUR LARGE 05/03/2021    UROBILINOGEN 0.2 05/03/2021    BILIRUBINUR Negative 05/03/2021    BILIRUBINUR NEGATIVE 08/17/2011    BLOODU SMALL 05/03/2021    GLUCOSEU Negative 05/03/2021    GLUCOSEU NEGATIVE 08/17/2011    AMORPHOUS FEW 06/25/2011     ABG:    Lab Results   Component Value Date    PH 7.433 05/10/2020    PH 7.377 06/27/2011    PCO2 31.0 05/10/2020    PO2 106.3 05/10/2020    HCO3 20.3 05/10/2020    BE -3.2 05/10/2020    O2SAT 97.5 05/10/2020     HgBA1c:    Lab Results   Component Value Date    LABA1C 6.9 05/18/2020     Microalbumen/Creatinine ratio:  No components found for: RUCREAT  FLP:    Lab Results   Component Value Date    TRIG 181 10/12/2011    HDL 37.0 10/12/2011    LDLCALC 118 10/12/2011     TSH:    Lab Results   Component Value Date    TSH 1.945 08/16/2013     VITAMIN B12: No components found for: B12  FOLATE:    Lab Results   Component Value Date    FOLATE >20.0 05/05/2021     Iron Saturation:  No components found for: PERCENTFE  FERRITIN:    Lab Results   Component Value Date    FERRITIN 767 05/05/2021     AMYLASE:    Lab Results   Component Value Date    AMYLASE 20 07/02/2011     LIPASE:    Lab Results   Component Value Date    LIPASE 14 01/31/2020     Fibrinogen Level:  No components found for: FIB  Urine Toxicology:  No components found for: IAMMENTA, IBARBIT, IBENZO, ICOCAINE, IMARTHC, IOPIATES, IPHENCYC  24 Hour Urine for Protein:  No components found for: RAWUPRO, UHRS3, WYGH51NG, UTV3  24 Hour Urine for Creatinine Clearance:  No components found for: CREAT4, UHRS10, UTV10     Imaging:    EXAMINATION:  ONE XRAY VIEW OF THE CHEST PORTABLE UPRIGHT     2/7/2022 12:20 pm     COMPARISON:  05/06/2021     HISTORY:  ORDERING SYSTEM PROVIDED HISTORY: syncope  TECHNOLOGIST PROVIDED HISTORY:  Reason for exam:->syncope     FINDINGS:  Chronic mild elevation of the right hemidiaphragm.  Cardiomegaly and mitral  annular calcification, unchanged.  No acute pulmonary infiltrate.  No overt  vascular congestion.  No pleural effusion or pneumothorax.  Calcified  mediastinal and hilar lymph nodes, unchanged.  Atherosclerotic thoracic aorta.        Impression  1.  No acute cardiopulmonary disease.  No significant change.     2.  Mild cardiomegaly.           Assessment  1-ESKD requiring KRT with IHD on treatment MWF via R IJ TDC  PLAN:  1. Follow for KRT with IHD-next treatment 2/9/22    2-Hyperkalemia  K+ 4.9 controlled  PLAN:  1. Follow K+    3-Anemia in CKD  HgB at goal of 10-12  PLAN:  1. Start  ARUN when HgB <10  2. Follow H/H    4. Sec HPTH of Renal origin  Ca++ 8.4  PLAN:  1. Correct Ca++ with IHD  2. Follow Ca++ and PO4    5. Syncopal Event at IHD today with associated bradycardia  PLAN:  1.  Defer to Cardiology    Thank you Dr. Emilia Gill MD for allowing us to participate in care of Mayelin Adrian MD  5:06 PM  2/7/2022

## 2022-02-07 NOTE — PROGRESS NOTES
Admission database completed to best of this RN's ability. Care plan and education initiated. Pt from [facility]; plan is to return there. HD pt, M-W-F at [facility] in [facility]; Dr. Rukhsana Regalado is her nephrologist. Pt has a new LUE AV fistula and a tessio. Pt is a DNR CCA. Pt is wheelchair bound, states she is barely able to stand, requires a roger lift at facility with all transfers. Needs assistance with most ADLs and has a large sacral wound.

## 2022-02-07 NOTE — ED NOTES
Pt to ed via ems from dialysis with a report of loc. On arrival pt alert and oriented x 4. bgl 126. VSS. ekg completed. Pt states does not really remember what happened.  Denies any complaints at present     Stuart Art RN  02/07/22 9700

## 2022-02-07 NOTE — ED PROVIDER NOTES
ED  Provider Note  Admit Date/RoomTime: 2/7/2022 11:12 AM  ED Room: 16/16     HPI:   Prentice Cogan is a 66 y.o. female presenting to the ED for loss of consciousness, beginning hours ago. History comes primarily from the patient. Patient Active Problem List:     Neurologic gait dysfunction     Renal failure, acute on chronic (HCC)     Diabetes mellitus ([facility]     Hypertension     Arthritis     Kidney disease     Knee problem     Anemia due to stage 3 chronic kidney disease     Primary osteoarthritis of both knees     Acute on chronic renal insufficiency     Acute kidney injury superimposed on CKD (HCC)     Acute renal failure superimposed on chronic kidney disease, on chronic dialysis ([facility]     2019 novel coronavirus disease (COVID-19)     Moderate protein-calorie malnutrition (HCC)     PERLA (acute kidney injury) ([facility]     End stage renal disease (HCC)     Pressure injury of sacral region, stage 4 (HCC)     Pressure injury of right hip, stage 3 (HCC)     Syncope and collapse  . The complaint has been intermittent, mild in severity, improved by nothing and worsened by nothing. Associated symptoms include none. Patient was reportedly at dialysis for her end-stage renal disease when she apparently lost consciousness. She was reportedly assessed and thought to be pulseless, however before any compressions or CPR could be initiated the patient regained consciousness and returned back to her baseline. This episode reportedly after the only a few moments, however she was disturbed by this event she was sent to [facility] emergency department for further evaluation and treatment. On arrival, the patient was assessed with history, physical exam, imaging studies, laboratory studies and ekg, vital signs. Vital signs were stable on arrival and the patient was afebrile. Review of Systems   Constitutional: Negative for chills and fever.    HENT: Negative for ear pain, sinus pressure and sore throat. Eyes: Negative for pain, discharge and redness. Respiratory: Negative for cough, shortness of breath and wheezing. Cardiovascular: Negative for chest pain. Gastrointestinal: Negative for abdominal distention, diarrhea, nausea and vomiting. Genitourinary: Negative for dysuria and frequency. Musculoskeletal: Negative for arthralgias and back pain. Skin: Negative for rash and wound. Neurological: Positive for syncope. Negative for weakness and headaches. Hematological: Negative for adenopathy. All other systems reviewed and are negative. Physical Exam  Vitals and nursing note reviewed. Constitutional:       Appearance: She is well-developed. She is ill-appearing (chronic). HENT:      Head: Normocephalic and atraumatic. Eyes:      Pupils: Pupils are equal, round, and reactive to light. Cardiovascular:      Rate and Rhythm: Normal rate and regular rhythm. Heart sounds: No murmur heard. No friction rub. Comments: Palpable thrill noted in left upper extremity  Pulmonary:      Effort: Pulmonary effort is normal. No respiratory distress. Breath sounds: Normal breath sounds. No wheezing or rales. Abdominal:      General: Bowel sounds are normal.      Palpations: Abdomen is soft. Tenderness: There is no abdominal tenderness. There is no guarding or rebound. Musculoskeletal:      Cervical back: Normal range of motion and neck supple. Skin:     General: Skin is warm and dry. Capillary Refill: Capillary refill takes less than 2 seconds. Neurological:      General: No focal deficit present. Mental Status: She is alert and oriented to person, place, and time. Cranial Nerves: No cranial nerve deficit. Sensory: No sensory deficit.       Coordination: Coordination normal.          Procedures     MDM  Number of Diagnoses or Management Options  Abnormal ECG  Syncope and collapse  Troponin level elevated  Diagnosis management comments: Emergency department evaluation was notable for significantly elevated troponin and new T wave inversions noted in a patient with significant comorbidities. These comorbidities do include end-stage renal disease, however there is no prior troponin level with which to compare, and her proBNP is markedly elevated as compared to previous. For this reason the patient was considered at high risk for further cardiopulmonary decompensation, and there was some concern about her syncopal event might have been secondary to a temporary nonsustained arrhythmia. The patient will therefore be admitted to the hospital service for further close evaluation and Cardiologic assessment     This information was relayed to the patient who understood this plan of care and was amenable to the plan. Patient was discussed with the admitting service (Dr. Van Alvares) who concurred with the decision for admission, and have agreed to admit the patient to telemetry.               --------------------------------------------- PAST HISTORY ---------------------------------------------  Past Medical History:  has a past medical history of Anemia in chronic kidney disease, Anxiety and depression, Arthritis, Chronic pain syndrome, COVID-19, COVID-19, Diabetes mellitus ([address] Dysphagia, oral phase, GERD (gastroesophageal reflux disease), Hemodialysis patient ([address] Hyperkalemia, Hypertension, Hypertensive kidney disease with stage 4 chronic kidney disease ([address] Insomnia, Kidney stones, MDD (major depressive disorder), Moderate protein-calorie malnutrition ([address] Morbid obesity ([address] Muscle weakness (generalized), Obesity, Pressure ulcer of sacral region, Sacral pressure ulcer, Unspecified osteoarthritis, unspecified site, and Weakness generalized. Past Surgical History:  has a past surgical history that includes Foot surgery ( ); Cystocopy (2011 );  section (x3); Upper gastrointestinal endoscopy (2.18.15);  Cholecystectomy (3/31/16); Abdomen surgery (N/A, 5/4/2020); Upper gastrointestinal endoscopy (N/A, 5/8/2020); Upper gastrointestinal endoscopy (N/A, 5/22/2020); vascular surgery (N/A, 5/6/2021); Dialysis fistula creation (Left, 8/5/2021); and Dialysis fistula creation (Right, 11/18/2021). Social History:  reports that she quit smoking about 10 years ago. She has a 30.00 pack-year smoking history. She has never used smokeless tobacco. She reports that she does not drink alcohol and does not use drugs. Family History: family history includes Cancer in her sister. The patients home medications have been reviewed. Allergies: Patient has no known allergies.     -------------------------------------------------- RESULTS -------------------------------------------------    LABS:  Results for orders placed or performed during the hospital encounter of 02/07/22   CBC Auto Differential   Result Value Ref Range    WBC 9.2 4.5 - 11.5 E9/L    RBC 3.14 (L) 3.50 - 5.50 E12/L    Hemoglobin 10.3 (L) 11.5 - 15.5 g/dL    Hematocrit 34.0 34.0 - 48.0 %    .3 (H) 80.0 - 99.9 fL    MCH 32.8 26.0 - 35.0 pg    MCHC 30.3 (L) 32.0 - 34.5 %    RDW 15.9 (H) 11.5 - 15.0 fL    Platelets 532 758 - 444 E9/L    MPV 10.1 7.0 - 12.0 fL    Neutrophils % 80.1 (H) 43.0 - 80.0 %    Immature Granulocytes % 0.5 0.0 - 5.0 %    Lymphocytes % 8.3 (L) 20.0 - 42.0 %    Monocytes % 9.0 2.0 - 12.0 %    Eosinophils % 1.6 0.0 - 6.0 %    Basophils % 0.5 0.0 - 2.0 %    Neutrophils Absolute 7.37 (H) 1.80 - 7.30 E9/L    Immature Granulocytes # 0.05 E9/L    Lymphocytes Absolute 0.76 (L) 1.50 - 4.00 E9/L    Monocytes Absolute 0.83 0.10 - 0.95 E9/L    Eosinophils Absolute 0.15 0.05 - 0.50 E9/L    Basophils Absolute 0.05 0.00 - 0.20 E9/L   Troponin   Result Value Ref Range    Troponin, High Sensitivity 127 (H) 0 - 9 ng/L   Brain Natriuretic Peptide   Result Value Ref Range    Pro-BNP >70,000 (H) 0 - 125 pg/mL   Comprehensive Metabolic Panel w/ Reflex to MG   Result Value Ref Range    Sodium 139 132 - 146 mmol/L    Potassium reflex Magnesium 4.9 3.5 - 5.0 mmol/L    Chloride 100 98 - 107 mmol/L    CO2 29 22 - 29 mmol/L    Anion Gap 10 7 - 16 mmol/L    Glucose 100 (H) 74 - 99 mg/dL    BUN 21 6 - 23 mg/dL    CREATININE 4.2 (H) 0.5 - 1.0 mg/dL    GFR Non-African American 10 >=60 mL/min/1.73    GFR African American 13     Calcium 8.4 (L) 8.6 - 10.2 mg/dL    Total Protein 6.8 6.4 - 8.3 g/dL    Albumin 3.4 (L) 3.5 - 5.2 g/dL    Total Bilirubin 0.4 0.0 - 1.2 mg/dL    Alkaline Phosphatase 97 35 - 104 U/L    ALT 9 0 - 32 U/L    AST 16 0 - 31 U/L   Troponin   Result Value Ref Range    Troponin, High Sensitivity 107 (H) 0 - 9 ng/L   CK   Result Value Ref Range    Total CK 25 20 - 180 U/L   CK-MB   Result Value Ref Range    CK-MB 3.5 0.0 - 4.3 ng/mL   CK MB   Result Value Ref Range    CK-MB 3.4 0.0 - 4.3 ng/mL   CK   Result Value Ref Range    Total CK 24 20 - 180 U/L   EKG 12 Lead   Result Value Ref Range    Ventricular Rate 69 BPM    Atrial Rate 69 BPM    P-R Interval 212 ms    QRS Duration 148 ms    Q-T Interval 510 ms    QTc Calculation (Bazett) 546 ms    P Axis 92 degrees    R Axis -39 degrees    T Axis -165 degrees   EKG 12 Lead   Result Value Ref Range    Ventricular Rate 67 BPM    Atrial Rate 67 BPM    P-R Interval 222 ms    QRS Duration 144 ms    Q-T Interval 530 ms    QTc Calculation (Bazett) 560 ms    P Axis 82 degrees    R Axis -41 degrees    T Axis -169 degrees       RADIOLOGY:  XR CHEST PORTABLE   Final Result   1. No acute cardiopulmonary disease. No significant change. 2.  Mild cardiomegaly.              EKG:  EKG interpretation  Rate 67  Sinus rhythm  First-degree AV block, left bundle branch block  left axis deviation  T wave inversions noted in lateral precordial leads, new as compared to previous  Abnormal EKG    ------------------------- NURSING NOTES AND VITALS REVIEWED ---------------------------  Date / Time Roomed:  2/7/2022 11:12 AM  ED Bed Assignment: 16/16    The nursing notes within the ED encounter and vital signs as below have been reviewed. Patient Vitals for the past 24 hrs:   BP Temp Pulse Resp SpO2 Height Weight   02/07/22 1659 116/72 97.8 °F (36.6 °C) 60 14 98 % -- --   02/07/22 1633 108/76 97.8 °F (36.6 °C) 60 14 97 % -- --   02/07/22 1523 -- -- 62 -- 99 % -- --   02/07/22 1522 -- -- -- 14 99 % -- --   02/07/22 1423 104/60 -- 62 14 99 % -- --   02/07/22 1250 (!) 140/61 -- 63 16 98 % -- --   02/07/22 1121 (!) 140/61 97.6 °F (36.4 °C) 69 16 97 % 5' 1\" (1.549 m) 190 lb (86.2 kg)       Oxygen Saturation Interpretation: Normal    ------------------------------------------ PROGRESS NOTES ------------------------------------------  Re-evaluation(s):  Time: 6:40 PM EST  Patients symptoms show no change  Repeat physical examination is not changed    Counseling:  I have spoken with the patient and discussed todays results, in addition to providing specific details for the plan of care and counseling regarding the diagnosis and prognosis. Their questions are answered at this time and they are agreeable with the plan of admission.    --------------------------------- ADDITIONAL PROVIDER NOTES ---------------------------------  Consultations:  Time: 6:40 PM EST. Spoke with Dr. Minerva Del Rosario. Discussed case. They will admit the patient. This patient's ED course included: a personal history and physicial examination, re-evaluation prior to disposition, IV medications and cardiac monitoring    This patient has remained hemodynamically stable during their ED course. Diagnosis:  1. Syncope and collapse    2. Abnormal ECG    3. Troponin level elevated        Disposition:  Patient's disposition: Admit to telemetry  Patient's condition is fair.          [address] DO  Resident  02/07/22 9151

## 2022-02-08 LAB
ALBUMIN SERPL-MCNC: 3 G/DL (ref 3.5–5.2)
ALP BLD-CCNC: 85 U/L (ref 35–104)
ALT SERPL-CCNC: 9 U/L (ref 0–32)
ANION GAP SERPL CALCULATED.3IONS-SCNC: 9 MMOL/L (ref 7–16)
AST SERPL-CCNC: 16 U/L (ref 0–31)
BILIRUB SERPL-MCNC: 0.3 MG/DL (ref 0–1.2)
BUN BLDV-MCNC: 30 MG/DL (ref 6–23)
CALCIUM SERPL-MCNC: 8.6 MG/DL (ref 8.6–10.2)
CHLORIDE BLD-SCNC: 102 MMOL/L (ref 98–107)
CO2: 28 MMOL/L (ref 22–29)
CREAT SERPL-MCNC: 5.3 MG/DL (ref 0.5–1)
GFR AFRICAN AMERICAN: 10
GFR NON-AFRICAN AMERICAN: 8 ML/MIN/1.73
GLUCOSE BLD-MCNC: 108 MG/DL (ref 74–99)
HCT VFR BLD CALC: 30 % (ref 34–48)
HEMOGLOBIN: 9.1 G/DL (ref 11.5–15.5)
LV EF: 60 %
LVEF MODALITY: NORMAL
MAGNESIUM: 2.4 MG/DL (ref 1.6–2.6)
MCH RBC QN AUTO: 33 PG (ref 26–35)
MCHC RBC AUTO-ENTMCNC: 30.3 % (ref 32–34.5)
MCV RBC AUTO: 108.7 FL (ref 80–99.9)
METER GLUCOSE: 108 MG/DL (ref 74–99)
METER GLUCOSE: 143 MG/DL (ref 74–99)
METER GLUCOSE: 155 MG/DL (ref 74–99)
METER GLUCOSE: 159 MG/DL (ref 74–99)
METER GLUCOSE: 77 MG/DL (ref 74–99)
PDW BLD-RTO: 16 FL (ref 11.5–15)
PHOSPHORUS: 6.1 MG/DL (ref 2.5–4.5)
PLATELET # BLD: 179 E9/L (ref 130–450)
PMV BLD AUTO: 10 FL (ref 7–12)
POTASSIUM SERPL-SCNC: 5.9 MMOL/L (ref 3.5–5)
RBC # BLD: 2.76 E12/L (ref 3.5–5.5)
SODIUM BLD-SCNC: 139 MMOL/L (ref 132–146)
TOTAL PROTEIN: 6.1 G/DL (ref 6.4–8.3)
TROPONIN, HIGH SENSITIVITY: 128 NG/L (ref 0–9)
TROPONIN, HIGH SENSITIVITY: 128 NG/L (ref 0–9)
WBC # BLD: 6.7 E9/L (ref 4.5–11.5)

## 2022-02-08 PROCEDURE — 99233 SBSQ HOSP IP/OBS HIGH 50: CPT | Performed by: INTERNAL MEDICINE

## 2022-02-08 PROCEDURE — 83735 ASSAY OF MAGNESIUM: CPT

## 2022-02-08 PROCEDURE — 2580000003 HC RX 258: Performed by: FAMILY MEDICINE

## 2022-02-08 PROCEDURE — 80053 COMPREHEN METABOLIC PANEL: CPT

## 2022-02-08 PROCEDURE — 6360000002 HC RX W HCPCS: Performed by: FAMILY MEDICINE

## 2022-02-08 PROCEDURE — G0378 HOSPITAL OBSERVATION PER HR: HCPCS

## 2022-02-08 PROCEDURE — 90935 HEMODIALYSIS ONE EVALUATION: CPT

## 2022-02-08 PROCEDURE — 36415 COLL VENOUS BLD VENIPUNCTURE: CPT

## 2022-02-08 PROCEDURE — 6370000000 HC RX 637 (ALT 250 FOR IP): Performed by: FAMILY MEDICINE

## 2022-02-08 PROCEDURE — 93306 TTE W/DOPPLER COMPLETE: CPT

## 2022-02-08 PROCEDURE — 82962 GLUCOSE BLOOD TEST: CPT

## 2022-02-08 PROCEDURE — 1200000000 HC SEMI PRIVATE

## 2022-02-08 PROCEDURE — 84100 ASSAY OF PHOSPHORUS: CPT

## 2022-02-08 PROCEDURE — 85027 COMPLETE CBC AUTOMATED: CPT

## 2022-02-08 PROCEDURE — 96372 THER/PROPH/DIAG INJ SC/IM: CPT

## 2022-02-08 PROCEDURE — 5A1D70Z PERFORMANCE OF URINARY FILTRATION, INTERMITTENT, LESS THAN 6 HOURS PER DAY: ICD-10-PCS | Performed by: INTERNAL MEDICINE

## 2022-02-08 PROCEDURE — 84484 ASSAY OF TROPONIN QUANT: CPT

## 2022-02-08 RX ORDER — GABAPENTIN 300 MG/1
300 CAPSULE ORAL
Status: DISCONTINUED | OUTPATIENT
Start: 2022-02-09 | End: 2022-02-10 | Stop reason: HOSPADM

## 2022-02-08 RX ORDER — DIPHENHYDRAMINE HCL 25 MG
25 TABLET ORAL EVERY 6 HOURS PRN
Status: DISCONTINUED | OUTPATIENT
Start: 2022-02-08 | End: 2022-02-10 | Stop reason: HOSPADM

## 2022-02-08 RX ADMIN — DOCUSATE SODIUM 100 MG: 100 CAPSULE, LIQUID FILLED ORAL at 08:36

## 2022-02-08 RX ADMIN — Medication 10 ML: at 08:38

## 2022-02-08 RX ADMIN — ESCITALOPRAM OXALATE 10 MG: 10 TABLET ORAL at 08:37

## 2022-02-08 RX ADMIN — TRAZODONE HYDROCHLORIDE 25 MG: 50 TABLET ORAL at 20:52

## 2022-02-08 RX ADMIN — POLYETHYLENE GLYCOL 3350 17 G: 17 POWDER, FOR SOLUTION ORAL at 08:36

## 2022-02-08 RX ADMIN — HEPARIN SODIUM 5000 UNITS: 10000 INJECTION INTRAVENOUS; SUBCUTANEOUS at 21:30

## 2022-02-08 RX ADMIN — Medication 2000 UNITS: at 08:37

## 2022-02-08 RX ADMIN — HEPARIN SODIUM 5000 UNITS: 10000 INJECTION INTRAVENOUS; SUBCUTANEOUS at 06:03

## 2022-02-08 RX ADMIN — INSULIN LISPRO 1 UNITS: 100 INJECTION, SOLUTION INTRAVENOUS; SUBCUTANEOUS at 21:16

## 2022-02-08 RX ADMIN — Medication 10 ML: at 21:23

## 2022-02-08 RX ADMIN — METOPROLOL SUCCINATE 25 MG: 25 TABLET, EXTENDED RELEASE ORAL at 08:36

## 2022-02-08 RX ADMIN — BUSPIRONE HYDROCHLORIDE 10 MG: 10 TABLET ORAL at 15:25

## 2022-02-08 RX ADMIN — ALOGLIPTIN 6.25 MG: 6.25 TABLET, FILM COATED ORAL at 08:37

## 2022-02-08 RX ADMIN — BUSPIRONE HYDROCHLORIDE 5 MG: 5 TABLET ORAL at 20:52

## 2022-02-08 RX ADMIN — INSULIN GLARGINE 10 UNITS: 100 INJECTION, SOLUTION SUBCUTANEOUS at 20:52

## 2022-02-08 RX ADMIN — HEPARIN SODIUM 5000 UNITS: 10000 INJECTION INTRAVENOUS; SUBCUTANEOUS at 14:10

## 2022-02-08 RX ADMIN — OXYCODONE HYDROCHLORIDE AND ACETAMINOPHEN 1 TABLET: 5; 325 TABLET ORAL at 15:25

## 2022-02-08 RX ADMIN — BUSPIRONE HYDROCHLORIDE 10 MG: 10 TABLET ORAL at 08:37

## 2022-02-08 RX ADMIN — SENNOSIDES 17.2 MG: 8.6 TABLET, COATED ORAL at 20:52

## 2022-02-08 RX ADMIN — SENNOSIDES 17.2 MG: 8.6 TABLET, COATED ORAL at 08:36

## 2022-02-08 RX ADMIN — DOCUSATE SODIUM 100 MG: 100 CAPSULE, LIQUID FILLED ORAL at 20:52

## 2022-02-08 ASSESSMENT — PAIN SCALES - GENERAL
PAINLEVEL_OUTOF10: 4
PAINLEVEL_OUTOF10: 0
PAINLEVEL_OUTOF10: 0
PAINLEVEL_OUTOF10: 6
PAINLEVEL_OUTOF10: 0
PAINLEVEL_OUTOF10: 0

## 2022-02-08 NOTE — DISCHARGE INSTR - COC
Continuity of Care Form    Patient Name: Dionicio Hickey   :  1950  MRN:  [id number]    Admit date:  2022  Discharge date:  ***    Code Status Order: Full Code   Advance Directives:      Admitting Physician:  Gini Ho MD  PCP: Gini Ho MD    Discharging Nurse: [facility] Unit/Room#: 6055/0889-P  Discharging Unit Phone Number: ***    Emergency Contact:   Extended Emergency Contact Information  Primary Emergency Contact: MaynorJohnson   [facility] [address] Phone: [phone]  Mobile Phone: [phone]  Relation: Child   needed? No  Secondary Emergency Contact: Yana [facility] [address] Phone: [phone]  Mobile Phone: [phone]  Relation: Child   needed? No    Past Surgical History:  Past Surgical History:   Procedure Laterality Date    ABDOMEN SURGERY N/A 2020    SACRAL WOUND DEBRIDEMENT CALL   WITH TIME AVAIL AM performed by Sin Mosher MD at [address]   x3    CHOLECYSTECTOMY  3/31/16    Laparoscopic-Dr. Tamara Chaidez       for kidney stones    DIALYSIS FISTULA CREATION Left 2021    INSERTION FISTULA LEFT ARM performed by Antonietta Reid MD at [address] Right 2021    REVISION AV FISTULA LEFT ARM performed by Antonietta Reid MD at [place] [address]       right     UPPER GASTROINTESTINAL ENDOSCOPY  2.18.15    Dr. [facility] Findings: Mild Gastrits and Duodenitis, 2cm Hiatal Hernia    UPPER GASTROINTESTINAL ENDOSCOPY N/A 2020    EGD CONTROL HEMORRHAGE performed by Sin Mosher MD at [address] N/A 2020    EGD BEDSIDE performed by Katrina Padilla MD at [address] N/A 2021    INSERTION TUNNELED DIALYSIS CATHETER performed by Antonietta Reid MD at [address]       Immunization History:   Immunization History   Administered Date(s) Administered    MARCYID-19Hans Purple top, DILUTE for use, 12+ yrs, 30mcg/0.3mL dose 2021, 2021       Active Problems:  Patient Active Problem List   Diagnosis Code    Neurologic gait dysfunction R26.9    Renal failure, acute on chronic (HCC) N17.9, N18.9    Diabetes mellitus ([facility] E11.9    Hypertension I10    Arthritis M19.90    Kidney disease N28.9    Knee problem M25.9    Anemia due to stage 3 chronic kidney disease N18.30, D63.1    Primary osteoarthritis of both knees M17.0    Acute on chronic renal insufficiency N28.9, N18.9    Acute kidney injury superimposed on CKD ([facility] N17.9, N18.9    Acute renal failure superimposed on chronic kidney disease, on chronic dialysis ([facility] N17.9, N18.9, Z99.2     novel coronavirus disease (COVID-19) U07.1    Moderate protein-calorie malnutrition ([facility] E44.0    PERLA (acute kidney injury) ([facility] N17.9    End stage renal disease (HCC) N18.6    Pressure injury of sacral region, stage 4 (HCC) L89.154    Pressure injury of right hip, stage 3 (HCC) H50.813    Syncope and collapse R55       Isolation/Infection:   Isolation            No Isolation          Patient Infection Status       Infection Onset Added Last Indicated Last Indicated By Review Planned Expiration Resolved Resolved By    None active    Resolved    COVID-19 (Rule Out) 21 COVID-19 (Ordered)   21 Rule-Out Test Resulted    COVID-19 (Rule Out) 10/09/20 10/09/20 10/09/20 COVID-19 (Ordered)   10/09/20 Rule-Out Test Resulted    COVID-19 (Rule Out) 20 COVID-19 (Ordered)   20     VRE 20 Culture, Wound   10/12/20 Tim Davalos RN    Enterococcus faecium Wound-Coccyx 20    MDRO (multi-drug resistant organism) 20 Culture, Wound   10/12/20 Tim Davalos RN    COVID-19 (Rule Out) 20 COVID-19 (Ordered)   20 Rule-Out Test Resulted    DETECTED 2020    COVID-19 (Rule Out) 05/14/20 05/15/20 20 Covid-19 Ambulatory (Ordered)   20 Rule-Out Test Resulted    Detected 2020    COVID-19 (Rule Out) 20 COVID-19 (Ordered)   20 Rule-Out Test Resulted    DETECTED 2020    C-diff Rule Out 20 CLOSTRIDIUM DIFFICILE EIA (Ordered)   20 Magnolia Melendez RN    Does not meet criteria    MRSA 20 Culture, Wound   20 Elza Parra RN    Wound buttock 5/3/20    COVID-19 20 COVID-19   20     Detected 2020, 2020, 2020, 5/3/2020    COVID-19 (Rule Out) 20 COVID-19 (Ordered)   20 Rule-Out Test Resulted    DETECTED 5/3/2020            Nurse Assessment:  Last Vital Signs: BP (!) 123/58   Pulse 69   Temp 98.9 °F (37.2 °C) (Oral)   Resp 16   Ht 5' 1\" (1.549 m)   Wt 205 lb 11.2 oz (93.3 kg)   SpO2 97%   BMI 38.87 kg/m²     Last documented pain score (0-10 scale): Pain Level: 0  Last Weight:   Wt Readings from Last 1 Encounters:   22 205 lb 11.2 oz (93.3 kg)     Mental Status:  {IP PT MENTAL STATUS:}    IV Access:  { CIELO IV ACCESS:650147602}    Nursing Mobility/ADLs:  Walking   {P DME YSJK:377418541}  Transfer  {P DME WMNK:409150975}  Bathing  {P DME CZUM:806946494}  Dressing  {CHP DME JMEX:797615140}  Toileting  {P DME NGYC:169888237}  Feeding  {[facility] DME VCPR:467197506}  Med Admin  {P DME JFYV:885172950}  Med Delivery   {MH CIELO MED Delivery:971940863}    Wound Care Documentation and Therapy:  Wound 10/10/20 Coccyx (Active)   Number of days: 486       Wound 21 Coccyx (Active)   Number of days: 396       Wound 21 Coccyx #1 (Active)   Wound Image   22   Dressing Status New dressing applied;Clean;Dry; Intact 22   Wound Cleansed Cleansed with saline 22   Dressing/Treatment ABD;Gauze dressing/dressing sponge;Moist to dry 22   Dressing Change Due 22 Wound Assessment Epithelialization;Erythema; Exposed structure muscle;Pink/red 22   Drainage Amount Moderate 22   Drainage Description Purulent;Collins 22   Odor Moderate 22   Carmen-wound Assessment Edematous; Maceration; Warm 22   Margins Unattached edges 22   Number of days: 180       Wound 21 Buttocks Right #2 (Active)   Number of days: 180       Wound 21 Sacrum in fold (Active)   Number of days: 63        Elimination:  Continence: Bowel: {YES / GE:88115}  Bladder: {YES / FQ:94767}  Urinary Catheter: {Urinary Catheter:247072431}   Colostomy/Ileostomy/Ileal Conduit: {YES / UB:30556}       Date of Last BM: ***  No intake or output data in the 24 hours ending 22 0908  No intake/output data recorded.     Safety Concerns:     [facility] [employer] Safety Concerns:462840069}    Impairments/Disabilities:      [employer] [facility] [employer] Impairments/Disabilities:673293839}    Nutrition Therapy:  Current Nutrition Therapy:   [facility] Diet List:752664785}    Routes of Feeding: {P DME Other Feedings:812583367}  Liquids: {Slp liquid thickness:40223}  Daily Fluid Restriction: {CHP DME Yes amt example:495801634}  Last Modified Barium Swallow with Video (Video Swallowing Test): {Done Not Done VKAA:587932163}    Treatments at the Time of Hospital Discharge:   Respiratory Treatments: ***  Oxygen Therapy:  {Therapy; copd oxygen:76225}  Ventilator:    { CC Vent WXWA:114249449}    Rehab Therapies: Physical Therapy and Occupational Therapy  Weight Bearing Status/Restrictions: [employer]  Weight Bearin}  Other Medical Equipment (for information only, NOT a DME order):  {EQUIPMENT:761655344}  Other Treatments: ***    Patient's personal belongings (please select all that are sent with patient):  {CHP DME Belongings:470280376}    RN SIGNATURE:  {Esignature:555357723}    CASE MANAGEMENT/SOCIAL WORK SECTION    Inpatient Status Date: ***    Readmission Risk Assessment Score:  Readmission Risk              Risk of Unplanned Readmission:  27           Discharging to Facility/ Agency   Name: [employer]  Address: [address] [facility]  Phone: [phone]  Fax: [phone]    Dialysis Facility (if applicable)   Name:  Address:  Dialysis Schedule:  Phone:  Fax:    / signature: Electronically signed by NISHA Chandler, JOEW on 2/8/22 at 9:13 AM EST    PHYSICIAN SECTION    Prognosis: Good    Condition at Discharge: Stable    Rehab Potential (if transferring to Rehab): Good    Recommended Labs or Other Treatments After Discharge: cbc cmp    Physician Certification: I certify the above information and transfer of Sj Whittington  is necessary for the continuing treatment of the diagnosis listed and that she requires [facility] for less 30 days.      Update Admission H&P: No change in H&P    PHYSICIAN SIGNATURE:  Electronically signed by Concetta Black MD on 2/10/22 at 8:40 AM EST

## 2022-02-08 NOTE — FLOWSHEET NOTE
02/08/22 1234   Vital Signs   BP (!) 142/58   Temp 98.2 °F (36.8 °C)   Pulse 64   Resp 16   Weight 203 lb 7.8 oz (92.3 kg)   Weight Method Estimated   Percent Weight Change -1.08   Pain Assessment   Pain Assessment 0-10   Pain Level 0   Post-Hemodialysis Assessment   Post-Treatment Procedures Blood returned;Catheter capped, clamped and heparinized x 2 ports   Machine Disinfection Process Exterior Machine Disinfection   Rinseback Volume (ml) 300 ml   Dialyzer Clearance Clear   Duration of Treatment (minutes) 180 minutes   Hemodialysis Output (ml) 1000 ml   NET Removed (ml) 700 ml   Tolerated Treatment Good   Patient Response to Treatment toelrated well   Bilateral Breath Sounds Clear   Edema None   Physician Notified?  Yes

## 2022-02-08 NOTE — PROGRESS NOTES
Comprehensive Nutrition Assessment    Type and Reason for Visit:  Initial,Positive Nutrition Screen,Wound    Nutrition Recommendations/Plan: Continue Current Diet, Start Gelatein BID and Jono BID    Nutrition Assessment:  NH pt s/p syncope & collapse episode at dialysis. Hx COVID/DM/dysphagia/ESRD w/ HD. Wound noted, will add ONS to aid in wound healing. Malnutrition Assessment:  Malnutrition Status:  Insufficient data    Context:  Chronic Illness     Findings of the 6 clinical characteristics of malnutrition:  Energy Intake:  Unable to assess  Weight Loss:  No significant weight loss     Body Fat Loss:  Unable to assess     Muscle Mass Loss:  Unable to assess    Fluid Accumulation:  No significant fluid accumulation     Strength:  Not Performed    Estimated Daily Nutrient Needs:  Energy (kcal):  ; Weight Used for Energy Requirements:  Current     Protein (g):  85-95 (1.8-2); Weight Used for Protein Requirements:  Ideal        Fluid (ml/day):  per renal; Method Used for Fluid Requirements:  Standard Renal      Nutrition Related Findings:  A&O, +BS, generalized edema, ESRD w/ HD, K+ 5.9, Phos 6.1      Wounds:  Pressure Injury       Current Nutrition Therapies:    ADULT DIET; Regular; 4 carb choices (60 gm/meal); Low Potassium (Less than 3000 mg/day);  Low Phosphorus (Less than 1000 mg)    Anthropometric Measures:  · Height: 5' 1\" (154.9 cm)  · Current Body Weight: 205 lb (93 kg) (2/8 bed)   · Admission Body Weight: 204 lb (92.5 kg) (2/7 bed)    · Usual Body Weight: 195 lb (88.5 kg) (5/2021 bed per EMR)     · Ideal Body Weight: 105 lbs; % Ideal Body Weight 195.2 %   · BMI: 38.8  · BMI Categories: Obese Class 2 (BMI 35.0 -39.9)       Nutrition Diagnosis:   · Increased nutrient needs related to increase demand for energy/nutrients as evidenced by wounds,dialysis    Nutrition Interventions:   Nutrition Education/Counseling:  No recommendation at this time   Coordination of Nutrition Care:  Continue to monitor while inpatient    Goals:  Pt to consume >50% of meals/ONS       Nutrition Monitoring and Evaluation:   Food/Nutrient Intake Outcomes:  Food and Nutrient Intake,Supplement Intake  Physical Signs/Symptoms Outcomes:  Biochemical Data,GI Status,Fluid Status or Edema,Nutrition Focused Physical Findings,Skin,Weight     Discharge Planning:     Too soon to determine     Electronically signed by Shonda Siddiqui RD, LD on 2/8/22 at 11:43 AM EST  Contact: [phone]

## 2022-02-08 NOTE — CARE COORDINATION
Social Work / Discharge Planning:    Patient admitted for syncope and collapse from [facility]. Social work met with patient and plan at discharge is to return to the facility. Left VM for lianorman Rondon regarding bed hold status. Await return call. Patient has history of ESRD and on dialysis through [address] (ph: [phone]), confirmed with Brit Ingram, patient on MWF schedule. They will need to be information when discharged. CIELO in Epic. Transportation envelope placed in chart. Social work will continue to follow. Addendum:    Per lianorman Kat, patient is a LTC bed hold from [facility]. No precert, no COVID test, and no therapy needed for return.  Electronically signed by SHANTA Lugo on 2/8/22 at 1:40 PM EST

## 2022-02-08 NOTE — H&P
[id number] [address]                              HISTORY AND PHYSICAL    PATIENT NAME: Michelle Roa                      :        1950  MED REC NO:   [id number]                            ROOM:       6963  ACCOUNT NO:   [de-identified]                           ADMIT DATE: 2022  PROVIDER:     Carmen Thomas MD    DATE OF ADMISSION:  2022. CHIEF COMPLAINT:  Syncopal episode. HISTORY OF PRESENT ILLNESS:  A 50-year-old with chronic kidney failure,  on hemodialysis; history of diabetes; previous history of hypertension;  and a remote history of sarcoidosis, presents to the emergency room  after having a syncopal episode while at dialysis. States she was  sitting up getting dialysis and felt faint. It sounds like she lost  consciousness for a brief time. The hemodialysis nurse states it was  difficult getting a pulse. The patient was laid down and came back, but  was sent to the emergency room for evaluation. The patient states this  is a frequent occurrence, although this is the first time she has ever  completely passed out. Of note, she is on midodrine for hypotension. Denies any chest pain or shortness of breath. Troponins were elevated,  but she is a chronic renal failure patient. She denied any chest pain  or shortness of breath and at present, she is asymptomatic. RECENT AND PRESENT MEDICATIONS:  See med rec in the chart. PAST MEDICAL HISTORY:  As described above. PAST SURGICAL HISTORY:  Includes multiple surgeries for hemodialysis  graft, cholecystectomy, and  section. SOCIAL HISTORY:  Quit smoking in . She has residing at the skilled  nursing facility for quite some time now. She does not drink. FAMILY MEDICAL HISTORY:  Noncontributory. REVIEW OF SYSTEMS:  ALLERGIES:  None known.   SKIN AND LYMPHATICS:  Negative other than she has a chronic decubitus on  her buttocks, which has been present for quite some time. CIRCULATORY:  Denies chest pain, orthopnea, or PND. Did have a syncopal  episode as described above. GENITOURINARY:  Negative. MUSCULOSKELETAL:  She has chronic severe knee pain from osteoarthritis. PHYSICAL EXAMINATION:  GENERAL:  The patient is sitting up, is eating breakfast, in no acute  distress. VITAL SIGNS:  Temperature 97.8, pulse 62, respirations 15, blood  pressure 140/58, O2 sat is 98% on room air. Monitor shows normal sinus  rhythm. SKIN:  Shows no pallor or jaundice. HEENT:  Eyes:  Reveal no icterus. NECK:  Supple without bruits or masses. CHEST:  Clear to auscultation. HEART:  Regular rate and rhythm without murmur, gallop, or rub. ABDOMEN:  Obese, soft, and nontender. EXTREMITIES:  Lower extremities reveal no cyanosis, clubbing, or edema. NEUROLOGICAL:  She is alert and oriented x3. At this point there is no  focal neurological deficits. LABORATORY STUDIES:  Potassium 5.9, creatinine 5.3, BUN 30, and  magnesium 2.4. Troponin is 128. White count is 6.7, hemoglobin 9.1,  and platelet count is 928,051. DIAGNOSIS:  Syncopal episode, most likely blood pressure related. At  this point Cardiology has seen her and did order a 2-D echo. We will  continue to monitor. Discharge plan will be back to skilled nursing  facility once stable.         Jodee Rudd MD    D: 02/08/2022 8:00:15       T: 02/08/2022 8:02:39     [email or web address]  Job#: [id number]     Doc#: [id number]    CC:

## 2022-02-08 NOTE — PROGRESS NOTES
Physical Therapy    Facility/Department: [facility] MED SURG/TELE    NAME: Dionicio Hickey  : 1950  MRN: [id number]     Order received and chart reviewed. Per chart review, pt is from nursing facility. Facility uses a roger for all transfers. Pt is at functional baseline. Will discontinue Pt order at this time.       Phyllis Fisher, [address]

## 2022-02-08 NOTE — PROGRESS NOTES
Dr Lugene Meigs,  Recommended dose of Gabapentin for dialysis patients is 300 mg three times a week after dialysis. Please consider titrating this patient's dose down to the suggested dose to prevent excess sedation. Thank Merle Hanson Pharm. D 2/8/2022 6:52 AM

## 2022-02-08 NOTE — PROGRESS NOTES
INPATIENT CARDIOLOGY FOLLOW-UP    Name: Jose F Randolph    Age: 66 y.o. Date of Admission: 2/7/2022 11:12 AM    Date of Service: 2/8/2022    Chief Complaint: Follow-up for unresponsive episode, abnormal electrocardiogram, hypertension, chronic kidney disease, moderate obesity, hyperkalemia    Interim History: The patient present remains compensated from a cardiovascular standpoint and denies interim symptoms. Most recent assessment of the nephrology service has been reviewed including further description of the events occurring at dialysis as related by dialysis nursing staff. She remains hemodynamically stable with stage I systolic hypertension and present hyperkalemia. Review of Systems: The remainder of a complete multisystem review including consitutional, central nervous, respiratory, circulatory, gastrointestinal, genitourinary, endocrinologic, hematologic, musculoskeletal and psychiatric are negative.     Problem List:  Patient Active Problem List   Diagnosis    Neurologic gait dysfunction    Renal failure, acute on chronic (HCC)    Diabetes mellitus ([address]    Hypertension    Arthritis    Kidney disease    Knee problem    Anemia due to stage 3 chronic kidney disease    Primary osteoarthritis of both knees    Acute on chronic renal insufficiency    Acute kidney injury superimposed on CKD ([address]    Acute renal failure superimposed on chronic kidney disease, on chronic dialysis ([address]    2019 novel coronavirus disease (COVID-19)    Moderate protein-calorie malnutrition ([address]    PERLA (acute kidney injury) ([address]    End stage renal disease ([address]    Pressure injury of sacral region, stage 4 ([address]    Pressure injury of right hip, stage 3 (HCC)    Syncope and collapse       Allergies:  No Known Allergies    Current Medications:  Current Facility-Administered Medications   Medication Dose Route Frequency Provider Last Rate Last Admin    [START ON 2/9/2022] gabapentin (NEURONTIN) capsule 300 mg  300 mg Oral Q MWF Noman Weaver MD        perflutren lipid microspheres (DEFINITY) injection 1.65 mg  1.5 mL IntraVENous ONCE PRN Kenneth Cazares MD        bisacodyl (DULCOLAX) EC tablet 10 mg  10 mg Oral Daily PRN Noman Weaver MD        busPIRone (BUSPAR) tablet 10 mg  10 mg Oral BID Noman Weaver MD        busPIRone (BUSPAR) tablet 5 mg  5 mg Oral Nightly Noman Weaver MD        docusate sodium (COLACE) capsule 100 mg  100 mg Oral BID Noman Weaver MD        escitalopram (LEXAPRO) tablet 10 mg  10 mg Oral Daily Noman Weaver MD        Ferric Citrate TABS 420 mg  420 mg Oral TID WC Noman Weaver MD        insulin glargine (LANTUS) injection vial 10 Units  10 Units SubCUTAneous Nightly Noman Weaver MD        linaCLOtide [facility]) capsule 72 mcg  72 mcg Oral QAM AC Noman Weaver MD        magnesium hydroxide (MILK OF MAGNESIA) 400 MG/5ML suspension 30 mL  30 mL Oral Daily PRN Noman Weaver MD        metoprolol succinate (TOPROL XL) extended release tablet 25 mg  25 mg Oral Daily Noman Weaver MD        midodrine (PROAMATINE) tablet 15 mg  15 mg Oral Once per day on Mon Wed Fri Noman Weaver MD   15 mg at 02/07/22 2315    midodrine (PROAMATINE) tablet 5 mg  5 mg Oral PRN Noman Weaver MD        ondansetron (ZOFRAN-ODT) disintegrating tablet 4 mg  4 mg Oral Q4H PRN Noman Weaver MD        polyethylene glycol [facility]) packet 17 g  17 g Oral Daily Noman Weaver MD        [facility]) tablet 17.2 mg  2 tablet Oral BID Noman Weaver MD        traZODone (DESYREL) tablet 25 mg  25 mg Oral Nightly Noman Weaver MD   25 mg at 02/07/22 2315    vitamin D (CHOLECALCIFEROL) tablet 2,000 Units  2,000 Units Oral Daily Noman Weaver MD        sodium chloride flush 0.9 % injection 5-40 mL  5-40 mL IntraVENous 2 times per day Noman Weaver MD   10 mL at 02/07/22 0532    sodium chloride flush 0.9 % injection 5-40 mL  5-40 mL IntraVENous PRN Noman Weaver MD        0.9 % sodium chloride infusion  25 mL IntraVENous PRN Noman Weaver MD       [facility] heparin (porcine) injection 5,000 Units  5,000 Units SubCUTAneous 3 times per day Ike Osborne MD   5,000 Units at 02/08/22 0603    acetaminophen (TYLENOL) tablet 650 mg  650 mg Oral Q6H PRN Ike Osborne MD        Or    acetaminophen (TYLENOL) suppository 650 mg  650 mg Rectal Q6H PRN Ike Osborne MD        glucose (GLUTOSE) 40 % oral gel 15 g  15 g Oral PRN Ike Osborne MD        dextrose 50 % IV solution  12.5 g IntraVENous PRN Ike Osborne MD        glucagon (rDNA) injection 1 mg  1 mg IntraMUSCular PRN Ike Osborne MD        dextrose 5 % solution  100 mL/hr IntraVENous PRN Ike Osborne MD        insulin lispro (HUMALOG) injection vial 0-12 Units  0-12 Units SubCUTAneous TID WC Ike Osborne MD        insulin lispro (HUMALOG) injection vial 0-6 Units  0-6 Units SubCUTAneous Nightly Ike Osborne MD        albuterol (PROVENTIL) nebulizer solution 2.5 mg  2.5 mg Nebulization Q4H PRN Ike Osborne MD        alogliptin (NESINA) tablet 6.25 mg  6.25 mg Oral Daily Ike Osborne MD        oxyCODONE-acetaminophen (PERCOCET) 5-325 MG per tablet 1 tablet  1 tablet Oral Q8H PRN Ike Osborne MD        And    oxyCODONE (ROXICODONE) immediate release tablet 2.5 mg  2.5 mg Oral Q8H PRN Ike Osborne MD          sodium chloride      dextrose         Physical Exam:  BP (!) 140/58   Pulse 62   Temp 97.8 °F (36.6 °C) (Oral)   Resp 15   Ht 5' 1\" (1.549 m)   Wt 205 lb 11.2 oz (93.3 kg)   SpO2 98%   BMI 38.87 kg/m²   Weight change: Wt Readings from Last 3 Encounters:   02/08/22 205 lb 11.2 oz (93.3 kg)   01/05/22 196 lb (88.9 kg)   12/07/21 203 lb 14.4 oz (92.5 kg)     The patient is awake, alert and in no discomfort or distress. No gross musculoskeletal deformity is present. No significant skin or nail changes are present. Gross examination of head, eyes, nose and throat are negative. Jugular venous pressure is normal and no carotid bruits are present. Normal respiratory effort is noted with no accessory muscle usage present. Lung fields are clear to ascultation. Cardiac examination is notable for a regular rate and rhythm with no palpable thrill. No gallop rhythm or cardiac murmur are identified. A benign abdominal examination is present with the exception of obesity and no masses or organomegaly. Intact pulses are present throughout all extremities and no peripheral edema is present. No focal neurologic deficits are present. Intake/Output:  No intake or output data in the 24 hours ending 02/08/22 0812  No intake/output data recorded.     Laboratory Tests:  Lab Results   Component Value Date    CREATININE 5.3 (H) 02/08/2022    BUN 30 (H) 02/08/2022     02/08/2022    K 5.9 (H) 02/08/2022     02/08/2022    CO2 28 02/08/2022     Recent Labs     02/07/22  1516   CKTOTAL 25  24   CKMB 3.4  3.5     No results found for: BNP  Lab Results   Component Value Date    WBC 6.7 02/08/2022    RBC 2.76 02/08/2022    HGB 9.1 02/08/2022    HCT 30.0 02/08/2022    .7 02/08/2022    MCH 33.0 02/08/2022    MCHC 30.3 02/08/2022    RDW 16.0 02/08/2022     02/08/2022    MPV 10.0 02/08/2022     Recent Labs     02/07/22  1156 02/08/22  0350   ALKPHOS 97 85   ALT 9 9   AST 16 16   PROT 6.8 6.1*   BILITOT 0.4 0.3   LABALBU 3.4* 3.0*     Lab Results   Component Value Date    MG 2.4 02/08/2022     Lab Results   Component Value Date    PROTIME 10.8 12/05/2021    PROTIME 15.3 06/25/2011    INR 1.0 12/05/2021     Lab Results   Component Value Date    TSH 1.945 08/16/2013     No components found for: CHLPL  Lab Results   Component Value Date    TRIG 181 (H) 10/12/2011    TRIG 268 (H) 06/26/2011     Lab Results   Component Value Date    HDL 37.0 (A) 10/12/2011    HDL 7.0 (A) 06/26/2011     Lab Results   Component Value Date    LDLCALC 118 (H) 10/12/2011    LDLCALC 36 06/26/2011       Cardiac Tests:  Telemetry findings reviewed: sinus rhythm/sinus bradycardia, no new tachy/bradyarrhythmias overnight      ASSESSMENT / PLAN: On a clinical basis, the patient appears compensated from a cardiovascular standpoint with no interim hemodynamic or arrhythmia related events in the face of her identified electrocardiographic changes and in the absence of an acute ischemic event. She is presently hemodynamically stable with intermittent stage I systolic hypertension during hospitalization and presently plans of medical management with further management of her identified hyperkalemia deferred to the nephrology service. Her echocardiogram is presently pending for assessment of the presence or absence of regional wall motion abnormalities related to electrocardiographic abnormalities as well as that of assessment of left ventricular systolic function to guide additional management decisions including that of potential additional ischemic assessment. Ongoing appropriate lifestyle modification to achieve weight reduction will benefit diastolic cardiac performance and management of potential obstructive sleep apnea with recommendations of a formal sleep assessment following discharge and initiation of nocturnal CPAP as appropriate to reduce risk of adverse cardiovascular effects. Continued aggressive risk factor modification of blood pressure, diabetes and serum lipids remain essential to reducing risk of future atherosclerotic development. Additional management will be deferred to primary care and the nephrology service. Note: This report was completed utilizing computer voice recognition software. Every effort has been made to ensure accuracy, however; inadvertent computerized transcription errors may be present. Melly Benavides.  Jesika Pham, [address] [facility] Cardiology

## 2022-02-08 NOTE — PROGRESS NOTES
Occupational Therapy  Date:2/8/2022  Patient Name: Destinee Thompson  Room: 3386/5794-X     Occupational Therapy (OT) order received and patient's medical record reviewed. Patient is a resident of an [facility] where she has consistent assistance from staff for completion of ADLs/IADLs and a roger lift is used for functional transfers. No OT evaluation completed secondary to no skilled OT needs within acute hospital setting identified. WESLEY Zuniga/PASTORA  License Number: [id number]

## 2022-02-09 LAB
ALBUMIN SERPL-MCNC: 3 G/DL (ref 3.5–5.2)
ALP BLD-CCNC: 82 U/L (ref 35–104)
ALT SERPL-CCNC: 9 U/L (ref 0–32)
ANION GAP SERPL CALCULATED.3IONS-SCNC: 9 MMOL/L (ref 7–16)
AST SERPL-CCNC: 16 U/L (ref 0–31)
BILIRUB SERPL-MCNC: 0.3 MG/DL (ref 0–1.2)
BUN BLDV-MCNC: 17 MG/DL (ref 6–23)
CALCIUM SERPL-MCNC: 8.7 MG/DL (ref 8.6–10.2)
CHLORIDE BLD-SCNC: 104 MMOL/L (ref 98–107)
CO2: 26 MMOL/L (ref 22–29)
CREAT SERPL-MCNC: 3.8 MG/DL (ref 0.5–1)
EKG ATRIAL RATE: 67 BPM
EKG ATRIAL RATE: 67 BPM
EKG ATRIAL RATE: 69 BPM
EKG P AXIS: 35 DEGREES
EKG P AXIS: 82 DEGREES
EKG P AXIS: 92 DEGREES
EKG P-R INTERVAL: 212 MS
EKG P-R INTERVAL: 222 MS
EKG P-R INTERVAL: 228 MS
EKG Q-T INTERVAL: 506 MS
EKG Q-T INTERVAL: 510 MS
EKG Q-T INTERVAL: 530 MS
EKG QRS DURATION: 144 MS
EKG QRS DURATION: 146 MS
EKG QRS DURATION: 148 MS
EKG QTC CALCULATION (BAZETT): 534 MS
EKG QTC CALCULATION (BAZETT): 546 MS
EKG QTC CALCULATION (BAZETT): 560 MS
EKG R AXIS: -23 DEGREES
EKG R AXIS: -39 DEGREES
EKG R AXIS: -41 DEGREES
EKG T AXIS: -165 DEGREES
EKG T AXIS: -169 DEGREES
EKG T AXIS: 159 DEGREES
EKG VENTRICULAR RATE: 67 BPM
EKG VENTRICULAR RATE: 67 BPM
EKG VENTRICULAR RATE: 69 BPM
GFR AFRICAN AMERICAN: 14
GFR NON-AFRICAN AMERICAN: 12 ML/MIN/1.73
GLUCOSE BLD-MCNC: 94 MG/DL (ref 74–99)
MAGNESIUM: 2.3 MG/DL (ref 1.6–2.6)
METER GLUCOSE: 104 MG/DL (ref 74–99)
METER GLUCOSE: 113 MG/DL (ref 74–99)
METER GLUCOSE: 148 MG/DL (ref 74–99)
METER GLUCOSE: 98 MG/DL (ref 74–99)
PHOSPHORUS: 5.3 MG/DL (ref 2.5–4.5)
POTASSIUM SERPL-SCNC: 4.6 MMOL/L (ref 3.5–5)
SODIUM BLD-SCNC: 139 MMOL/L (ref 132–146)
TOTAL PROTEIN: 6.3 G/DL (ref 6.4–8.3)

## 2022-02-09 PROCEDURE — 36415 COLL VENOUS BLD VENIPUNCTURE: CPT

## 2022-02-09 PROCEDURE — 83735 ASSAY OF MAGNESIUM: CPT

## 2022-02-09 PROCEDURE — 6360000002 HC RX W HCPCS: Performed by: INTERNAL MEDICINE

## 2022-02-09 PROCEDURE — 6360000002 HC RX W HCPCS: Performed by: FAMILY MEDICINE

## 2022-02-09 PROCEDURE — 6370000000 HC RX 637 (ALT 250 FOR IP): Performed by: FAMILY MEDICINE

## 2022-02-09 PROCEDURE — G0378 HOSPITAL OBSERVATION PER HR: HCPCS

## 2022-02-09 PROCEDURE — 82962 GLUCOSE BLOOD TEST: CPT

## 2022-02-09 PROCEDURE — 80053 COMPREHEN METABOLIC PANEL: CPT

## 2022-02-09 PROCEDURE — 90935 HEMODIALYSIS ONE EVALUATION: CPT

## 2022-02-09 PROCEDURE — 84100 ASSAY OF PHOSPHORUS: CPT

## 2022-02-09 PROCEDURE — 96376 TX/PRO/DX INJ SAME DRUG ADON: CPT

## 2022-02-09 PROCEDURE — 96372 THER/PROPH/DIAG INJ SC/IM: CPT

## 2022-02-09 PROCEDURE — 99233 SBSQ HOSP IP/OBS HIGH 50: CPT | Performed by: INTERNAL MEDICINE

## 2022-02-09 PROCEDURE — 2580000003 HC RX 258: Performed by: FAMILY MEDICINE

## 2022-02-09 PROCEDURE — 96374 THER/PROPH/DIAG INJ IV PUSH: CPT

## 2022-02-09 PROCEDURE — 1200000000 HC SEMI PRIVATE

## 2022-02-09 PROCEDURE — 93005 ELECTROCARDIOGRAM TRACING: CPT | Performed by: INTERNAL MEDICINE

## 2022-02-09 PROCEDURE — 6370000000 HC RX 637 (ALT 250 FOR IP): Performed by: INTERNAL MEDICINE

## 2022-02-09 RX ORDER — PROCHLORPERAZINE EDISYLATE 5 MG/ML
10 INJECTION INTRAMUSCULAR; INTRAVENOUS EVERY 6 HOURS PRN
Status: DISCONTINUED | OUTPATIENT
Start: 2022-02-09 | End: 2022-02-10 | Stop reason: HOSPADM

## 2022-02-09 RX ORDER — ATORVASTATIN CALCIUM 40 MG/1
40 TABLET, FILM COATED ORAL NIGHTLY
Status: DISCONTINUED | OUTPATIENT
Start: 2022-02-09 | End: 2022-02-10 | Stop reason: HOSPADM

## 2022-02-09 RX ADMIN — PROCHLORPERAZINE EDISYLATE 10 MG: 5 INJECTION INTRAMUSCULAR; INTRAVENOUS at 05:14

## 2022-02-09 RX ADMIN — BUSPIRONE HYDROCHLORIDE 5 MG: 5 TABLET ORAL at 21:41

## 2022-02-09 RX ADMIN — OXYCODONE HYDROCHLORIDE AND ACETAMINOPHEN 1 TABLET: 5; 325 TABLET ORAL at 09:58

## 2022-02-09 RX ADMIN — HEPARIN SODIUM 5000 UNITS: 10000 INJECTION INTRAVENOUS; SUBCUTANEOUS at 21:40

## 2022-02-09 RX ADMIN — METOPROLOL SUCCINATE 25 MG: 25 TABLET, EXTENDED RELEASE ORAL at 12:22

## 2022-02-09 RX ADMIN — GABAPENTIN 300 MG: 300 CAPSULE ORAL at 12:22

## 2022-02-09 RX ADMIN — BUSPIRONE HYDROCHLORIDE 10 MG: 10 TABLET ORAL at 17:33

## 2022-02-09 RX ADMIN — EPOETIN ALFA-EPBX 3000 UNITS: 3000 INJECTION, SOLUTION INTRAVENOUS; SUBCUTANEOUS at 18:14

## 2022-02-09 RX ADMIN — INSULIN GLARGINE 10 UNITS: 100 INJECTION, SOLUTION SUBCUTANEOUS at 21:41

## 2022-02-09 RX ADMIN — HEPARIN SODIUM 5000 UNITS: 10000 INJECTION INTRAVENOUS; SUBCUTANEOUS at 05:49

## 2022-02-09 RX ADMIN — PROCHLORPERAZINE EDISYLATE 10 MG: 5 INJECTION INTRAMUSCULAR; INTRAVENOUS at 23:17

## 2022-02-09 RX ADMIN — HEPARIN SODIUM 5000 UNITS: 10000 INJECTION INTRAVENOUS; SUBCUTANEOUS at 14:10

## 2022-02-09 RX ADMIN — ATORVASTATIN CALCIUM 40 MG: 40 TABLET, FILM COATED ORAL at 21:41

## 2022-02-09 RX ADMIN — BUSPIRONE HYDROCHLORIDE 10 MG: 10 TABLET ORAL at 12:29

## 2022-02-09 RX ADMIN — POLYETHYLENE GLYCOL 3350 17 G: 17 POWDER, FOR SOLUTION ORAL at 12:21

## 2022-02-09 RX ADMIN — ESCITALOPRAM OXALATE 10 MG: 10 TABLET ORAL at 12:23

## 2022-02-09 RX ADMIN — DIPHENHYDRAMINE HCL 25 MG: 25 TABLET ORAL at 00:38

## 2022-02-09 RX ADMIN — TRAZODONE HYDROCHLORIDE 25 MG: 50 TABLET ORAL at 21:42

## 2022-02-09 RX ADMIN — Medication 2000 UNITS: at 12:23

## 2022-02-09 RX ADMIN — SENNOSIDES 17.2 MG: 8.6 TABLET, COATED ORAL at 21:41

## 2022-02-09 RX ADMIN — SENNOSIDES 17.2 MG: 8.6 TABLET, COATED ORAL at 12:22

## 2022-02-09 RX ADMIN — DOCUSATE SODIUM 100 MG: 100 CAPSULE, LIQUID FILLED ORAL at 12:22

## 2022-02-09 RX ADMIN — Medication 10 ML: at 12:23

## 2022-02-09 RX ADMIN — ALOGLIPTIN 6.25 MG: 6.25 TABLET, FILM COATED ORAL at 12:29

## 2022-02-09 RX ADMIN — DOCUSATE SODIUM 100 MG: 100 CAPSULE, LIQUID FILLED ORAL at 21:41

## 2022-02-09 RX ADMIN — MIDODRINE HYDROCHLORIDE 15 MG: 5 TABLET ORAL at 12:22

## 2022-02-09 ASSESSMENT — PAIN SCALES - GENERAL
PAINLEVEL_OUTOF10: 8
PAINLEVEL_OUTOF10: 0
PAINLEVEL_OUTOF10: 4

## 2022-02-09 NOTE — CARE COORDINATION
Pt admitted with sycopal event at HD with associated bradycardia. cardiology following; for 2decho/stress in am. Plan at discharge is to return to [facility] (bed [facility]) no precert/ no covid required. Hx ESRD [facility] ([phone]) M-W-F. Need notified at discharge. Transport forms on chart. Await cardiology plan. Pamela Dos Santos.

## 2022-02-09 NOTE — PROGRESS NOTES
Nephrology Progress Note  Patient's Name: Benito Gay  3:55 PM  2/9/2022    Nephrologist: [facility]    Reason for Consult:  ESKD      History of Present Ilness from the 2/7/22 note:    Benito Gay is a 66 y.o. female with prior history ESKD receiving IHD on an MWF schedule. She was sent in from the Dialysis. Pt states she felt like she had to go to sleep and the next thing she remembers is waking up and EMS was at dialysis taking care of her. She denies CP or SOB. I spoke with the RN at outpt dialysis and she stated the pt had a decreased LOC and when checked her initial HR was 27. She was bolused with IVF apprx 1L 0.9NS  And EMS was called. Her HR was back. She is on metoprolol as an outpt. She had 3 of the 4hrs of her dialysis treatment     2/9/22: Pt awake alert seen post dialysis states she feels tired. Pt on treatment today had bradycardia HR into the 30's UFR decreased and pt bolused with IVF with improvement. SBP at the time of the bradycardia was 120    Past Medical History:   Diagnosis Date    Anemia in chronic kidney disease     Anxiety and depression     Arthritis     Chronic pain syndrome     COVID-19 05/2020    COVID-19     Diabetes mellitus ([facility] [address] [facility]     Dysphagia, oral phase     GERD (gastroesophageal reflux disease)     Hemodialysis patient ([facility] [address]     M-W-F    Hyperkalemia     Hypertension     Hypertensive kidney disease with stage 4 chronic kidney disease (HCC)     Insomnia     Kidney stones     MDD (major depressive disorder)     Moderate protein-calorie malnutrition (HCC)     Morbid obesity (HCC)     Muscle weakness (generalized)     Obesity     Pressure ulcer of sacral region     Sacral pressure ulcer 08/03/2021    stage 4    Unspecified osteoarthritis, unspecified site     Weakness generalized        Past Surgical History:   Procedure Laterality Date    ABDOMEN SURGERY N/A 5/4/2020    SACRAL WOUND DEBRIDEMENT CALL   WITH TIME AVAIL AM performed by Azucena Gonzalez MD at [address]  x3    CHOLECYSTECTOMY  3/31/16    Laparoscopic-Dr. Fatou Morgan    CYSTOSCOPY  2011     for kidney stones    DIALYSIS FISTULA CREATION Left 8/5/2021    INSERTION FISTULA LEFT ARM performed by Clarine Collet, MD at [address] Right 11/18/2021    REVISION AV FISTULA LEFT ARM performed by Clarine Collet, MD at [address]  2009     right     UPPER GASTROINTESTINAL ENDOSCOPY  2.18.15    Dr. Traci Hodges Findings: Mild Gastrits and Duodenitis, 2cm Hiatal Hernia    UPPER GASTROINTESTINAL ENDOSCOPY N/A 5/8/2020    EGD CONTROL HEMORRHAGE performed by Vahe Lora MD at [address] N/A 5/22/2020    EGD BEDSIDE performed by Bernadette Bernheim, MD at [address] N/A 5/6/2021    INSERTION TUNNELED DIALYSIS CATHETER performed by Clarine Collet, MD at [facility] OR       Family History   Problem Relation Age of Onset    Cancer Sister         reports that she quit smoking about 10 years ago. She has a 30.00 pack-year smoking history. She has never used smokeless tobacco. She reports that she does not drink alcohol and does not use drugs. Allergies:  Patient has no known allergies.     Current Medications:    prochlorperazine (COMPAZINE) injection 10 mg, Q6H PRN  atorvastatin (LIPITOR) tablet 40 mg, Nightly  regadenoson (LEXISCAN) injection 0.4 mg, ONCE PRN  gabapentin (NEURONTIN) capsule 300 mg, Q MWF  diphenhydrAMINE (BENADRYL) tablet 25 mg, Q6H PRN  perflutren lipid microspheres (DEFINITY) injection 1.65 mg, ONCE PRN  bisacodyl (DULCOLAX) EC tablet 10 mg, Daily PRN  busPIRone (BUSPAR) tablet 10 mg, BID  busPIRone (BUSPAR) tablet 5 mg, Nightly  docusate sodium (COLACE) capsule 100 mg, BID  escitalopram (LEXAPRO) tablet 10 mg, Daily  Ferric Citrate TABS 420 mg, TID WC  insulin glargine (LANTUS) injection vial 10 Units, Nightly  linaCLOtide (LINZESS) capsule 72 mcg, QAM AC  magnesium hydroxide (MILK OF MAGNESIA) 400 MG/5ML suspension 30 mL, Daily PRN  metoprolol succinate (TOPROL XL) extended release tablet 25 mg, Daily  midodrine (PROAMATINE) tablet 15 mg, Once per day on Mon Wed Fri  midodrine (PROAMATINE) tablet 5 mg, PRN  polyethylene glycol (GLYCOLAX) packet 17 g, Daily  senna (SENOKOT) tablet 17.2 mg, BID  traZODone (DESYREL) tablet 25 mg, Nightly  vitamin D (CHOLECALCIFEROL) tablet 2,000 Units, Daily  sodium chloride flush 0.9 % injection 5-40 mL, 2 times per day  sodium chloride flush 0.9 % injection 5-40 mL, PRN  0.9 % sodium chloride infusion, PRN  heparin (porcine) injection 5,000 Units, 3 times per day  acetaminophen (TYLENOL) tablet 650 mg, Q6H PRN   Or  acetaminophen (TYLENOL) suppository 650 mg, Q6H PRN  glucose (GLUTOSE) 40 % oral gel 15 g, PRN  dextrose 50 % IV solution, PRN  glucagon (rDNA) injection 1 mg, PRN  dextrose 5 % solution, PRN  insulin lispro (HUMALOG) injection vial 0-12 Units, TID WC  insulin lispro (HUMALOG) injection vial 0-6 Units, Nightly  albuterol (PROVENTIL) nebulizer solution 2.5 mg, Q4H PRN  alogliptin (NESINA) tablet 6.25 mg, Daily  oxyCODONE-acetaminophen (PERCOCET) 5-325 MG per tablet 1 tablet, Q8H PRN   And  oxyCODONE (ROXICODONE) immediate release tablet 2.5 mg, Q8H PRN        Review of Systems:   Pertinent items are noted in HPI.     Physical exam:   Constitutional:  Elderly female in NAD  Vitals:   VITALS:  BP (!) 113/47   Pulse 70   Temp 98.3 °F (36.8 °C) (Oral)   Resp 18   Ht 5' 1\" (1.549 m)   Wt 202 lb 9.6 oz (91.9 kg)   SpO2 100%   BMI 38.28 kg/m²   24HR INTAKE/OUTPUT:      Intake/Output Summary (Last 24 hours) at 2/9/2022 1555  Last data filed at 2/9/2022 1100  Gross per 24 hour   Intake 840 ml   Output 1000 ml   Net -160 ml     URINARY CATHETER OUTPUT ([place]):  External Urinary Catheter-Output (mL): 250 mL  DRAIN/TUBE OUTPUT:     VENT SETTINGS:  Vent Information  SpO2: 100 %  Additional Respiratory  Assessments  Pulse: 70  Resp: 18  SpO2: 100 %    Skin: no rash, turgor wnl  Heent:  eomi, mmm  Neck: no bruits or jvd noted, R IJ TDC  Cardiovascular:  S1, S2 without m/r/g  Respiratory: decreased BS at the bases  Abdomen:  +bs, soft, nt, nd  Ext: 1+ bilat lower extremity edema  Psychiatric: mood and affect appropriate    Data:   Labs:  CBC:   Lab Results   Component Value Date    WBC 6.7 02/08/2022    RBC 2.76 02/08/2022    HGB 9.1 02/08/2022    HCT 30.0 02/08/2022    .7 02/08/2022    MCH 33.0 02/08/2022    MCHC 30.3 02/08/2022    RDW 16.0 02/08/2022     02/08/2022    MPV 10.0 02/08/2022     CBC with Differential:    Lab Results   Component Value Date    WBC 6.7 02/08/2022    RBC 2.76 02/08/2022    HGB 9.1 02/08/2022    HCT 30.0 02/08/2022     02/08/2022    .7 02/08/2022    MCH 33.0 02/08/2022    MCHC 30.3 02/08/2022    RDW 16.0 02/08/2022    NRBC 0.9 05/25/2020    SEGSPCT 71 08/16/2013    BANDSPCT 1 03/29/2016    METASPCT 0.9 05/10/2020    LYMPHOPCT 8.3 02/07/2022    PROMYELOPCT 0.9 05/09/2020    MONOPCT 9.0 02/07/2022    MYELOPCT 1.7 05/11/2020    BASOPCT 0.5 02/07/2022    MONOSABS 0.83 02/07/2022    LYMPHSABS 0.76 02/07/2022    EOSABS 0.15 02/07/2022    BASOSABS 0.05 02/07/2022     Hemoglobin/Hematocrit:    Lab Results   Component Value Date    HGB 9.1 02/08/2022    HCT 30.0 02/08/2022     CMP:    Lab Results   Component Value Date     02/09/2022    K 4.6 02/09/2022    K 4.9 02/07/2022     02/09/2022    CO2 26 02/09/2022    BUN 17 02/09/2022    CREATININE 3.8 02/09/2022    GFRAA 14 02/09/2022    LABGLOM 12 02/09/2022    GLUCOSE 94 02/09/2022    GLUCOSE 149 10/12/2011    PROT 6.3 02/09/2022    LABALBU 3.0 02/09/2022    LABALBU 4.1 10/12/2011    CALCIUM 8.7 02/09/2022    BILITOT 0.3 02/09/2022    ALKPHOS 82 02/09/2022    AST 16 02/09/2022    ALT 9 02/09/2022     BMP:    Lab Results   Component Value Date     02/09/2022    K 4.6 02/09/2022    K 4.9 02/07/2022     02/09/2022    CO2 26 02/09/2022    BUN 17 02/09/2022    LABALBU 3.0 02/09/2022    LABALBU 4.1 10/12/2011    CREATININE 3.8 02/09/2022    CALCIUM 8.7 02/09/2022    GFRAA 14 02/09/2022    LABGLOM 12 02/09/2022    GLUCOSE 94 02/09/2022    GLUCOSE 149 10/12/2011     BUN/Creatinine:    Lab Results   Component Value Date    BUN 17 02/09/2022    CREATININE 3.8 02/09/2022     Hepatic Function Panel:    Lab Results   Component Value Date    ALKPHOS 82 02/09/2022    ALT 9 02/09/2022    AST 16 02/09/2022    PROT 6.3 02/09/2022    BILITOT 0.3 02/09/2022    BILIDIR <0.2 01/07/2021    IBILI see below 01/07/2021    LABALBU 3.0 02/09/2022    LABALBU 4.1 10/12/2011     Albumin:    Lab Results   Component Value Date    LABALBU 3.0 02/09/2022    LABALBU 4.1 10/12/2011     Calcium:    Lab Results   Component Value Date    CALCIUM 8.7 02/09/2022     Ionized Calcium:    Lab Results   Component Value Date    IONCA 1.59 06/26/2013     Magnesium:    Lab Results   Component Value Date    MG 2.3 02/09/2022     Phosphorus:    Lab Results   Component Value Date    PHOS 5.3 02/09/2022     LDH:    Lab Results   Component Value Date     05/24/2020     Uric Acid:    Lab Results   Component Value Date    LABURIC 7.7 07/19/2019    URICACID 11.6 10/12/2011     PT/INR:    Lab Results   Component Value Date    PROTIME 10.8 12/05/2021    PROTIME 15.3 06/25/2011    INR 1.0 12/05/2021     PTT:    Lab Results   Component Value Date    APTT 28.3 12/05/2021   [APTT}  Troponin:    Lab Results   Component Value Date    TROPONINI 0.09 10/09/2020     U/A:    Lab Results   Component Value Date    COLORU Yellow 05/03/2021    PROTEINU >=300 05/03/2021    PHUR 7.0 05/03/2021    WBCUA PACKED 05/03/2021    WBCUA 5-10 08/17/2011    RBCUA 0-1 05/03/2021    RBCUA 0-1 06/26/2013    YEAST Present 08/11/2020    BACTERIA MODERATE 05/03/2021    CLARITYU Clear 05/03/2021    SPECGRAV 1.020 05/03/2021    LEUKOCYTESUR LARGE 05/03/2021    UROBILINOGEN 0.2 05/03/2021    BILIRUBINUR Negative 05/03/2021    BILIRUBINUR NEGATIVE 08/17/2011    BLOODU SMALL 05/03/2021    GLUCOSEU Negative 05/03/2021    GLUCOSEU NEGATIVE 08/17/2011    AMORPHOUS FEW 06/25/2011     ABG:    Lab Results   Component Value Date    PH 7.433 05/10/2020    PH 7.377 06/27/2011    PCO2 31.0 05/10/2020    PO2 106.3 05/10/2020    HCO3 20.3 05/10/2020    BE -3.2 05/10/2020    O2SAT 97.5 05/10/2020     HgBA1c:    Lab Results   Component Value Date    LABA1C 6.9 05/18/2020     Microalbumen/Creatinine ratio:  No components found for: RUCREAT  FLP:    Lab Results   Component Value Date    TRIG 181 10/12/2011    HDL 37.0 10/12/2011    LDLCALC 118 10/12/2011     TSH:    Lab Results   Component Value Date    TSH 1.945 08/16/2013     VITAMIN B12: No components found for: B12  FOLATE:    Lab Results   Component Value Date    FOLATE >20.0 05/05/2021     Iron Saturation:  No components found for: PERCENTFE  FERRITIN:    Lab Results   Component Value Date    FERRITIN 767 05/05/2021     AMYLASE:    Lab Results   Component Value Date    AMYLASE 20 07/02/2011     LIPASE:    Lab Results   Component Value Date    LIPASE 14 01/31/2020     Fibrinogen Level:  No components found for: FIB  Urine Toxicology:  No components found for: IAMMENTA, IBARBIT, IBENZO, ICOCAINE, IMARTHC, IOPIATES, IPHENCYC  24 Hour Urine for Protein:  No components found for: RAWUPRO, UHRS3, QRUW45SD, UTV3  24 Hour Urine for Creatinine Clearance:  No components found for: CREAT4, UHRS10, UTV10     Imaging:    EXAMINATION:  ONE XRAY VIEW OF THE CHEST PORTABLE UPRIGHT     2/7/2022 12:20 pm     COMPARISON:  05/06/2021     HISTORY:  ORDERING SYSTEM PROVIDED HISTORY: syncope  TECHNOLOGIST PROVIDED HISTORY:  Reason for exam:->syncope     FINDINGS:  Chronic mild elevation of the right hemidiaphragm.  Cardiomegaly and mitral  annular calcification, unchanged.  No acute pulmonary infiltrate.  No overt  vascular congestion.  No pleural effusion or pneumothorax.  Calcified  mediastinal and hilar lymph nodes, unchanged.  Atherosclerotic thoracic aorta.        Impression  1.  No acute cardiopulmonary disease.  No significant change.     2.  Mild cardiomegaly. 2D ECHO 2/8/22:  Summary    Technically limited study with off axis images.    Left ventricular internal dimensions were normal in diastole and systole.    Abnormal (paradoxical) motion consistent with conduction abnormality.    No regional wall motion abnormalities seen.    The left atrium is moderately dilated.    Focal calcification mitral valve leaflets and subvalvular structures.    Severe mitral annular calcification.    Mild mitral regurgitation is present.    Moderate functional mitral stenosis .    The aortic valve appears mildly sclerotic. Assessment  1-ESKD requiring KRT with IHD on treatment MWF via R IJ TDC  PLAN:  1. IHD today with an episode of bradycardia with HR to the 30's-responded to IVF bolus and decrease in the UFR  2. Follow labs    2-Hyperkalemia  K+4.6 pre IHD today  PLAN:  1. Follow K+    3-Anemia in CKD  HgB below goal of 10-12  PLAN:  1. Start  ARUN as HgB <10  2. Follow H/H    4. 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 Correct Ca++ with IHD  2. Follow Ca++ and PO4  3. Continue on the home ferric Citrate as binder    5. Syncopal Event at IHD  with associated bradycardia-recurrent episode of bradycardia on IHD today  PLAN:  1.  Defer to Cardiology    Thank you Dr. Letty Armas MD for allowing us to participate in care of Rabia Jose A Adrian MD  3:55 PM  2/9/2022

## 2022-02-09 NOTE — PROGRESS NOTES
INPATIENT CARDIOLOGY FOLLOW-UP    Name: Sudhir Current    Age: 66 y.o. Date of Admission: 2/7/2022 11:12 AM    Date of Service: 2/9/2022    Chief Complaint: Follow-up for unresponsive episode, abnormal electrocardiogram, hypertension, chronic kidney disease, moderate obesity    Interim History: The patient present remains compensated with no active complaints. She underwent additional short-term dialysis the previous day in light of her hyperkalemia with subsequent resolution and no complications. No interim arrhythmias have been noted by additional review of arrhythmia monitoring. Her echocardiogram demonstrated evidence of a normal-sized left ventricular chamber with abnormal septal motion in the face of her conduction system abnormalities with no additional regional wall motion abnormalities and normal left ventricular systolic function as well is that of left atrial enlargement and mitral leaflet, subvalvular and annular calcification contributing to mild mitral regurgitation and moderate functional mitral stenosis. Review of Systems: The remainder of a complete multisystem review including consitutional, central nervous, respiratory, circulatory, gastrointestinal, genitourinary, endocrinologic, hematologic, musculoskeletal and psychiatric are negative.     Problem List:  Patient Active Problem List   Diagnosis    Neurologic gait dysfunction    Renal failure, acute on chronic (HCC)    Diabetes mellitus ([address]    Hypertension    Arthritis    Kidney disease    Knee problem    Anemia due to stage 3 chronic kidney disease    Primary osteoarthritis of both knees    Acute on chronic renal insufficiency    Acute kidney injury superimposed on CKD ([address]    Acute renal failure superimposed on chronic kidney disease, on chronic dialysis ([address]    2019 novel coronavirus disease (COVID-19)    Moderate protein-calorie malnutrition ([address]    PERLA (acute kidney injury) ([address]    End stage renal disease [facility]    Pressure injury of sacral region, stage 4 (HCC)    Pressure injury of right hip, stage 3 (HCC)    Syncope and collapse       Allergies:  No Known Allergies    Current Medications:  Current Facility-Administered Medications   Medication Dose Route Frequency Provider Last Rate Last Admin    prochlorperazine (COMPAZINE) injection 10 mg  10 mg IntraVENous Q6H PRN Alesha Cook MD   10 mg at 02/09/22 0514    gabapentin (NEURONTIN) capsule 300 mg  300 mg Oral Q MWF Alesha Cook MD        diphenhydrAMINE (BENADRYL) tablet 25 mg  25 mg Oral Q6H PRN Alesha Cook MD   25 mg at 02/09/22 0038    perflutren lipid microspheres (DEFINITY) injection 1.65 mg  1.5 mL IntraVENous ONCE PRN Brent De Jesus MD        bisacodyl (DULCOLAX) EC tablet 10 mg  10 mg Oral Daily PRN Alesha Cook MD        busPIRone (BUSPAR) tablet 10 mg  10 mg Oral BID Alesha Cook MD   10 mg at 02/08/22 1525    busPIRone (BUSPAR) tablet 5 mg  5 mg Oral Nightly Alesha Cook MD   5 mg at 02/08/22 2052    docusate sodium (COLACE) capsule 100 mg  100 mg Oral BID Alesha Cook MD   100 mg at 02/08/22 2052    escitalopram (LEXAPRO) tablet 10 mg  10 mg Oral Daily Alesha Cook MD   10 mg at 02/08/22 9235    Ferric Citrate TABS 420 mg  420 mg Oral TID  Alesha Cook MD        insulin glargine (LANTUS) injection vial 10 Units  10 Units SubCUTAneous Nightly Alesha Cook MD   10 Units at 02/08/22 2052    linaCLOtide (LINZESS) capsule 72 mcg  72 mcg Oral QAM AC Alesha Cook MD        magnesium hydroxide (MILK OF MAGNESIA) 400 MG/5ML suspension 30 mL  30 mL Oral Daily PRN Alesha Cook MD        metoprolol succinate (TOPROL XL) extended release tablet 25 mg  25 mg Oral Daily Alesha Cook MD   25 mg at 02/08/22 0836    midodrine (PROAMATINE) tablet 15 mg  15 mg Oral Once per day on Mon Wed Fri Alesha Cook MD   15 mg at 02/07/22 2766    midodrine (PROAMATINE) tablet 5 mg  5 mg Oral PRN Alesha Cook MD        polyethylene glycol [facility]) packet 17 g  17 g Oral Daily Alesha Cook MD   17 g at 02/08/22 7410    senna (SENOKOT) tablet 17.2 mg  2 tablet Oral BID Alesha Cook MD   17.2 mg at 02/08/22 2052    traZODone (DESYREL) tablet 25 mg  25 mg Oral Nightly Alesha Cook MD   25 mg at 02/08/22 2052    vitamin D (CHOLECALCIFEROL) tablet 2,000 Units  2,000 Units Oral Daily Alesha Cook MD   2,000 Units at 02/08/22 3123    sodium chloride flush 0.9 % injection 5-40 mL  5-40 mL IntraVENous 2 times per day Alesha Cook MD   10 mL at 02/08/22 2123    sodium chloride flush 0.9 % injection 5-40 mL  5-40 mL IntraVENous PRN Alesha Cook MD        0.9 % sodium chloride infusion  25 mL IntraVENous PRN Alesha Cook MD        heparin (porcine) injection 5,000 Units  5,000 Units SubCUTAneous 3 times per day Alesha Cook MD   5,000 Units at 02/09/22 0549    acetaminophen (TYLENOL) tablet 650 mg  650 mg Oral Q6H PRN Alesha Cook MD        Or   Katty Larger acetaminophen (TYLENOL) suppository 650 mg  650 mg Rectal Q6H PRN Alesha Cook MD        glucose (GLUTOSE) 40 % oral gel 15 g  15 g Oral PRN Alesha Cook MD        dextrose 50 % IV solution  12.5 g IntraVENous PRN Alesha Cook MD        glucagon (rDNA) injection 1 mg  1 mg IntraMUSCular PRN Alesha Cook MD        dextrose 5 % solution  100 mL/hr IntraVENous PRN Alesha Cook MD        insulin lispro (HUMALOG) injection vial 0-12 Units  0-12 Units SubCUTAneous TID WC Alesha Cook MD        insulin lispro (HUMALOG) injection vial 0-6 Units  0-6 Units SubCUTAneous Nightly Alesha Cook MD   1 Units at 02/08/22 2116    albuterol (PROVENTIL) nebulizer solution 2.5 mg  2.5 mg Nebulization Q4H PRN Alesha Cook MD        alogliptin (NESINA) tablet 6.25 mg  6.25 mg Oral Daily Alesha Cook MD   6.25 mg at 02/08/22 0837    oxyCODONE-acetaminophen (PERCOCET) 5-325 MG per tablet 1 tablet  1 tablet Oral Q8H PRN Alesha Cook MD   1 tablet at 02/08/22 1525    And    oxyCODONE (ROXICODONE) immediate release tablet 2.5 mg  2.5 mg Oral Q8H PRN Alesha Cook MD         [facility] sodium chloride      dextrose         Physical Exam:  BP (!) 121/49   Pulse 69   Temp 98.1 °F (36.7 °C) (Oral)   Resp 16   Ht 5' 1\" (1.549 m)   Wt 200 lb (90.7 kg)   SpO2 95%   BMI 37.79 kg/m²   Weight change: 13 lb 7.8 oz (6.116 kg)  Wt Readings from Last 3 Encounters:   02/09/22 200 lb (90.7 kg)   01/05/22 196 lb (88.9 kg)   12/07/21 203 lb 14.4 oz (92.5 kg)     The patient is awake, alert and in no discomfort or distress. No gross musculoskeletal deformity is present. No significant skin or nail changes are present. Gross examination of head, eyes, nose and throat are negative. Jugular venous pressure is normal and no carotid bruits are present. Normal respiratory effort is noted with no accessory muscle usage present. Lung fields are clear to ascultation. Cardiac examination is notable for a regular rate and rhythm with no palpable thrill. No gallop rhythm or cardiac murmur are identified. A benign abdominal examination is present with the exception of obesity and no masses or organomegaly. Intact pulses are present throughout all extremities and no peripheral edema is present. No focal neurologic deficits are present. Intake/Output:    Intake/Output Summary (Last 24 hours) at 2/9/2022 0708  Last data filed at 2/8/2022 1713  Gross per 24 hour   Intake 900 ml   Output 1250 ml   Net -350 ml     No intake/output data recorded.     Laboratory Tests:  Lab Results   Component Value Date    CREATININE 3.8 (H) 02/09/2022    BUN 17 02/09/2022     02/09/2022    K 4.6 02/09/2022     02/09/2022    CO2 26 02/09/2022     Recent Labs     02/07/22  1516   CKTOTAL 25  24   CKMB 3.4  3.5     No results found for: BNP  Lab Results   Component Value Date    WBC 6.7 02/08/2022    RBC 2.76 02/08/2022    HGB 9.1 02/08/2022    HCT 30.0 02/08/2022    .7 02/08/2022    MCH 33.0 02/08/2022    MCHC 30.3 02/08/2022    RDW 16.0 02/08/2022     02/08/2022    MPV 10.0 02/08/2022     Recent Labs 02/07/22  1156 02/08/22  0350 02/09/22  0447   ALKPHOS 97 85 82   ALT 9 9 9   AST 16 16 16   PROT 6.8 6.1* 6.3*   BILITOT 0.4 0.3 0.3   LABALBU 3.4* 3.0* 3.0*     Lab Results   Component Value Date    MG 2.3 02/09/2022     Lab Results   Component Value Date    PROTIME 10.8 12/05/2021    PROTIME 15.3 06/25/2011    INR 1.0 12/05/2021     Lab Results   Component Value Date    TSH 1.945 08/16/2013     No components found for: CHLPL  Lab Results   Component Value Date    TRIG 181 (H) 10/12/2011    TRIG 268 (H) 06/26/2011     Lab Results   Component Value Date    HDL 37.0 (A) 10/12/2011    HDL 7.0 (A) 06/26/2011     Lab Results   Component Value Date    LDLCALC 118 (H) 10/12/2011    LDLCALC 36 06/26/2011       Cardiac Tests:  ECG: A  repeat electrocardiogram reviewed at the time of evaluation demonstrates evidence of sinus rhythm with a first-degree atrioventricular block and WV interval approximately 230 ms with an intraventricular conduction delay and persistent precordial T wave inversions  Telemetry findings reviewed: sinus rhythm no new tachy/bradyarrhythmias overnight  Last Echocardiogram: An echocardiogram while technically suboptimal demonstrated evidence of a normal-sized left ventricular chamber with abnormal septal motion in the face of her known conduction abnormalities with no regional wall motion abnormalities and normal left ventricular systolic function. Moderate left atrial enlargement is present with focal mitral leaflet and subvalvular as well as that of annular calcification with mild mitral regurgitation and moderate functional mitral stenosis      ASSESSMENT / PLAN: On a clinical basis, the patient remains compensated from a cardiovascular standpoint with no identified arrhythmias subsequent to hospitalization. She underwent additional short-term dialysis predominantly on the basis of her hyperkalemia the previous day with subsequent correction plans of her routine dialysis session later today.   A repeat electrocardiogram continues to demonstrate evidence of anterior T wave inversions with her echocardiogram demonstrating no evidence of regional wall motion abnormalities and normal left ventricular systolic function in spite of her electrocardiographic changes with additional evidence of mitral leaflet and annular calcification contributing to moderate functional mitral stenosis. In light of her ischemic risk, on a prognostic basis, a gated vasodilator myocardial perfusion imaging study has been recommended will be completed tomorrow in the face of her present enteral intake and plans of dialysis to further assess needs of additional cardiovascular intervention. Presently, ongoing medical management will be maintained with his outlined needs of appropriate lifestyle modification to achieve weight reduction in part to reduce risk of obstructive sleep apnea in addition to that of aggressive risk factor modification of blood pressure, diabetes and serum lipids in attempt to reduce risk of future atherosclerotic development. Additional management will be deferred to primary care and the nephrology service. At the time of evaluation, her condition inclusive of the findings of her present objective cardiovascular testing and recommendations were discussed with the duration of discussion counseling exceeding 35 minutes    Note: This report was completed utilizing computer voice recognition software. Every effort has been made to ensure accuracy, however; inadvertent computerized transcription errors may be present. Kobe Kemp.  Nnamdi Ngo, [address] [place] [address] Cardiology

## 2022-02-09 NOTE — FLOWSHEET NOTE
02/09/22 1100   Vital Signs   BP (!) 156/52   Temp 97.6 °F (36.4 °C)   Pulse 72   Resp 17   Post-Hemodialysis Assessment   Post-Treatment Procedures Blood returned;Catheter capped, clamped and heparinized x 2 ports   Machine Disinfection Process Acid/Vinegar Clean;Heat Disinfect; Exterior Machine Disinfection   Rinseback Volume (ml) 300 ml   Total Liters Processed (l/min) 64.1 l/min   Dialyzer Clearance Lightly streaked   Duration of Treatment (minutes) 180 minutes   Heparin amount administered during treatment (units) 0 units   Hemodialysis Intake (ml) 300 ml   Hemodialysis Output (ml) 1000 ml   NET Removed (ml) 700 ml   Tolerated Treatment Good   Patient Response to Treatment 700ml removed

## 2022-02-10 ENCOUNTER — CLINICAL DOCUMENTATION (OUTPATIENT)
Dept: GENERAL RADIOLOGY | Age: 72
End: 2022-02-10

## 2022-02-10 ENCOUNTER — APPOINTMENT (OUTPATIENT)
Dept: NUCLEAR MEDICINE | Age: 72
DRG: 308 | End: 2022-02-10
Payer: COMMERCIAL

## 2022-02-10 ENCOUNTER — APPOINTMENT (OUTPATIENT)
Dept: NON INVASIVE DIAGNOSTICS | Age: 72
DRG: 308 | End: 2022-02-10
Payer: COMMERCIAL

## 2022-02-10 VITALS
WEIGHT: 205.03 LBS | SYSTOLIC BLOOD PRESSURE: 97 MMHG | HEART RATE: 76 BPM | BODY MASS INDEX: 38.71 KG/M2 | DIASTOLIC BLOOD PRESSURE: 55 MMHG | TEMPERATURE: 97.7 F | OXYGEN SATURATION: 94 % | HEIGHT: 61 IN | RESPIRATION RATE: 18 BRPM

## 2022-02-10 PROBLEM — N18.6 STAGE 5 CHRONIC KIDNEY DISEASE ON CHRONIC DIALYSIS (HCC): Status: ACTIVE | Noted: 2021-07-28

## 2022-02-10 PROBLEM — Z99.2 STAGE 5 CHRONIC KIDNEY DISEASE ON CHRONIC DIALYSIS (HCC): Status: ACTIVE | Noted: 2021-07-28

## 2022-02-10 LAB
ALBUMIN SERPL-MCNC: 3 G/DL (ref 3.5–5.2)
ALP BLD-CCNC: 83 U/L (ref 35–104)
ALT SERPL-CCNC: 7 U/L (ref 0–32)
ANION GAP SERPL CALCULATED.3IONS-SCNC: 12 MMOL/L (ref 7–16)
AST SERPL-CCNC: 15 U/L (ref 0–31)
BILIRUB SERPL-MCNC: 0.3 MG/DL (ref 0–1.2)
BUN BLDV-MCNC: 12 MG/DL (ref 6–23)
CALCIUM SERPL-MCNC: 8.7 MG/DL (ref 8.6–10.2)
CHLORIDE BLD-SCNC: 103 MMOL/L (ref 98–107)
CO2: 25 MMOL/L (ref 22–29)
CREAT SERPL-MCNC: 3.2 MG/DL (ref 0.5–1)
GFR AFRICAN AMERICAN: 17
GFR NON-AFRICAN AMERICAN: 14 ML/MIN/1.73
GLUCOSE BLD-MCNC: 129 MG/DL (ref 74–99)
HCT VFR BLD CALC: 28.9 % (ref 34–48)
HEMOGLOBIN: 8.9 G/DL (ref 11.5–15.5)
LV EF: 70 %
LVEF MODALITY: NORMAL
MAGNESIUM: 2 MG/DL (ref 1.6–2.6)
MCH RBC QN AUTO: 33 PG (ref 26–35)
MCHC RBC AUTO-ENTMCNC: 30.8 % (ref 32–34.5)
MCV RBC AUTO: 107 FL (ref 80–99.9)
METER GLUCOSE: 122 MG/DL (ref 74–99)
METER GLUCOSE: 122 MG/DL (ref 74–99)
METER GLUCOSE: 125 MG/DL (ref 74–99)
PDW BLD-RTO: 15.4 FL (ref 11.5–15)
PHOSPHORUS: 3.9 MG/DL (ref 2.5–4.5)
PLATELET # BLD: 148 E9/L (ref 130–450)
PMV BLD AUTO: 10.5 FL (ref 7–12)
POTASSIUM SERPL-SCNC: 4.1 MMOL/L (ref 3.5–5)
RBC # BLD: 2.7 E12/L (ref 3.5–5.5)
SODIUM BLD-SCNC: 140 MMOL/L (ref 132–146)
TOTAL PROTEIN: 6.4 G/DL (ref 6.4–8.3)
WBC # BLD: 13 E9/L (ref 4.5–11.5)

## 2022-02-10 PROCEDURE — 84100 ASSAY OF PHOSPHORUS: CPT

## 2022-02-10 PROCEDURE — G0378 HOSPITAL OBSERVATION PER HR: HCPCS

## 2022-02-10 PROCEDURE — 93017 CV STRESS TEST TRACING ONLY: CPT

## 2022-02-10 PROCEDURE — 3430000000 HC RX DIAGNOSTIC RADIOPHARMACEUTICAL: Performed by: RADIOLOGY

## 2022-02-10 PROCEDURE — 99233 SBSQ HOSP IP/OBS HIGH 50: CPT | Performed by: INTERNAL MEDICINE

## 2022-02-10 PROCEDURE — 83735 ASSAY OF MAGNESIUM: CPT

## 2022-02-10 PROCEDURE — A9500 TC99M SESTAMIBI: HCPCS | Performed by: RADIOLOGY

## 2022-02-10 PROCEDURE — 96372 THER/PROPH/DIAG INJ SC/IM: CPT

## 2022-02-10 PROCEDURE — 78452 HT MUSCLE IMAGE SPECT MULT: CPT

## 2022-02-10 PROCEDURE — 6360000002 HC RX W HCPCS: Performed by: INTERNAL MEDICINE

## 2022-02-10 PROCEDURE — 6370000000 HC RX 637 (ALT 250 FOR IP): Performed by: FAMILY MEDICINE

## 2022-02-10 PROCEDURE — 82962 GLUCOSE BLOOD TEST: CPT

## 2022-02-10 PROCEDURE — 78452 HT MUSCLE IMAGE SPECT MULT: CPT | Performed by: INTERNAL MEDICINE

## 2022-02-10 PROCEDURE — 85027 COMPLETE CBC AUTOMATED: CPT

## 2022-02-10 PROCEDURE — 93016 CV STRESS TEST SUPVJ ONLY: CPT | Performed by: INTERNAL MEDICINE

## 2022-02-10 PROCEDURE — 6360000002 HC RX W HCPCS: Performed by: FAMILY MEDICINE

## 2022-02-10 PROCEDURE — 36415 COLL VENOUS BLD VENIPUNCTURE: CPT

## 2022-02-10 PROCEDURE — 93018 CV STRESS TEST I&R ONLY: CPT | Performed by: INTERNAL MEDICINE

## 2022-02-10 PROCEDURE — 80053 COMPREHEN METABOLIC PANEL: CPT

## 2022-02-10 PROCEDURE — 2580000003 HC RX 258: Performed by: FAMILY MEDICINE

## 2022-02-10 RX ADMIN — Medication 30 MILLICURIE: at 07:25

## 2022-02-10 RX ADMIN — OXYCODONE HYDROCHLORIDE AND ACETAMINOPHEN 1 TABLET: 5; 325 TABLET ORAL at 00:47

## 2022-02-10 RX ADMIN — Medication 2000 UNITS: at 11:10

## 2022-02-10 RX ADMIN — ESCITALOPRAM OXALATE 10 MG: 10 TABLET ORAL at 11:10

## 2022-02-10 RX ADMIN — BUSPIRONE HYDROCHLORIDE 10 MG: 10 TABLET ORAL at 11:10

## 2022-02-10 RX ADMIN — Medication 10 MILLICURIE: at 07:25

## 2022-02-10 RX ADMIN — Medication 10 ML: at 11:11

## 2022-02-10 RX ADMIN — HEPARIN SODIUM 5000 UNITS: 10000 INJECTION INTRAVENOUS; SUBCUTANEOUS at 14:15

## 2022-02-10 RX ADMIN — ALOGLIPTIN 6.25 MG: 6.25 TABLET, FILM COATED ORAL at 11:09

## 2022-02-10 RX ADMIN — REGADENOSON 0.4 MG: 0.08 INJECTION, SOLUTION INTRAVENOUS at 09:08

## 2022-02-10 RX ADMIN — BUSPIRONE HYDROCHLORIDE 10 MG: 10 TABLET ORAL at 16:27

## 2022-02-10 RX ADMIN — HEPARIN SODIUM 5000 UNITS: 10000 INJECTION INTRAVENOUS; SUBCUTANEOUS at 06:30

## 2022-02-10 RX ADMIN — METOPROLOL SUCCINATE 25 MG: 25 TABLET, EXTENDED RELEASE ORAL at 11:10

## 2022-02-10 ASSESSMENT — PAIN SCALES - GENERAL
PAINLEVEL_OUTOF10: 4
PAINLEVEL_OUTOF10: 7

## 2022-02-10 NOTE — PROGRESS NOTES
Perfusion imaging reviewed and demonstrates no evidence of perfusion abnormalities with normal left ventricular systolic function and suggests a low risk of acute ischemic events. Presently from a cardiovascular standpoint continued medical management as previously outlined as well as that of continued appropriate lifestyle modification risk factor modification will be necessary to reduce risk of future atherosclerotic development. We will further evaluate her during hospitalization should additional cardiovascular difficulties or concerns arise with recommended routine cardiovascular follow-up with her primary cardiologist, Jing Cabrera.

## 2022-02-10 NOTE — CARE COORDINATION
D/c order noted; to transport to [facility] via physician's 5: 30 pm.Brandie at [facility] notified, Joe Benavidez at [facility]./ pt. Staff . N-N [id number]. Umair Price.

## 2022-02-10 NOTE — CARE COORDINATION
Pt for cardiac stress today; can d/c back to [facility] if negative. ESRD receives HD at [facility]. ph [phone] will need to notify at discharge. Transport packet on chart. Kenia Salas.

## 2022-02-10 NOTE — PROCEDURES
Following placement of an intravenous catheter 10 millicuries of technetium 99m sestamibi was administered followed by a saline flush. Approximately 45 minutes later resting SPECT images were obtained. After completion of resting images a regadenoson stress test was performed via a bolus injection of 0.4 milligrams of regadenason followed by a saline flush. Following regadenoson administration 30 millicuries of technetium 99m sestamibi was injected followed by repeat post stress SPECT imaging approximately 60 minutes post completion of administration. Baseline electrocardiogram demonstrates evidence of sinus rhythm with an intraventricular conduction delay and T wave inversions within the anterolateral leads. The patient experienced no anginal symptoms and remained hemodynamically stable during administration and no electrocardiographic changes were noted. Perfusion images pending.

## 2022-02-10 NOTE — PROGRESS NOTES
INPATIENT CARDIOLOGY FOLLOW-UP    Name: Henrry Ellsworth    Age: 66 y.o. Date of Admission: 2/7/2022 11:12 AM    Date of Service: 2/10/2022    Chief Complaint: Follow-up for unresponsive episode, abnormal electrocardiogram, hypertension, chronic kidney disease, moderate obesity    Interim History: The patient presently remains compensated from a cardiovascular standpoint and relates no interim symptoms including during dialysis where in nephrology notes bradycardia was reported. Confirmation of this cannot be documented upon review of arrhythmia monitoring with monitoring review revealing factitious report of bradycardia numerically not corresponding to that of appropriate assessment by review of heart rates on the basis of QRS complexes. She otherwise has tolerated dialysis with no additional events and remains hemodynamically stable. Perfusion imaging is presently pending. Review of Systems: The remainder of a complete multisystem review including consitutional, central nervous, respiratory, circulatory, gastrointestinal, genitourinary, endocrinologic, hematologic, musculoskeletal and psychiatric are negative.     Problem List:  Patient Active Problem List   Diagnosis    Neurologic gait dysfunction    Renal failure, acute on chronic (HCC)    Diabetes mellitus ([address]    Hypertension    Arthritis    Kidney disease    Knee problem    Anemia due to stage 3 chronic kidney disease    Primary osteoarthritis of both knees    Acute on chronic renal insufficiency    Acute kidney injury superimposed on CKD ([address]    Acute renal failure superimposed on chronic kidney disease, on chronic dialysis ([address]    2019 novel coronavirus disease (COVID-19)    Moderate protein-calorie malnutrition ([address]    PERLA (acute kidney injury) ([address]    End stage renal disease ([address]    Pressure injury of sacral region, stage 4 ([address]    Pressure injury of right hip, stage 3 (HCC)    Syncope and collapse Allergies:  No Known Allergies    Current Medications:  Current Facility-Administered Medications   Medication Dose Route Frequency Provider Last Rate Last Admin    prochlorperazine (COMPAZINE) injection 10 mg  10 mg IntraVENous Q6H PRN Ama Morris MD   10 mg at 02/09/22 2317    atorvastatin (LIPITOR) tablet 40 mg  40 mg Oral Nightly Britta Tejada MD   40 mg at 02/09/22 2141    regadenoson (LEXISCAN) injection 0.4 mg  0.4 mg IntraVENous ONCE PRN Britta Tejada MD        epoetin derek-epbx (RETACRIT) injection 3,000 Units  3,000 Units SubCUTAneous Once per day on Mon Wed Fri Terri Orozco MD   3,000 Units at 02/09/22 1814    gabapentin (NEURONTIN) capsule 300 mg  300 mg Oral Q MWF Ama Morris MD   300 mg at 02/09/22 1222    diphenhydrAMINE (BENADRYL) tablet 25 mg  25 mg Oral Q6H PRN Ama Morris MD   25 mg at 02/09/22 0038    perflutren lipid microspheres (DEFINITY) injection 1.65 mg  1.5 mL IntraVENous ONCE PRN Britta Tejada MD        bisacodyl (DULCOLAX) EC tablet 10 mg  10 mg Oral Daily PRN Ama Morris MD        busPIRone (BUSPAR) tablet 10 mg  10 mg Oral BID Ama Morris MD   10 mg at 02/09/22 1733    busPIRone (BUSPAR) tablet 5 mg  5 mg Oral Nightly Ama Morris MD   5 mg at 02/09/22 2141    docusate sodium (COLACE) capsule 100 mg  100 mg Oral BID Ama Morris MD   100 mg at 02/09/22 2141    escitalopram (LEXAPRO) tablet 10 mg  10 mg Oral Daily Ama Morris MD   10 mg at 02/09/22 1223    Ferric Citrate TABS 420 mg  420 mg Oral TID WC Ama Morris MD        insulin glargine (LANTUS) injection vial 10 Units  10 Units SubCUTAneous Nightly Ama Morris MD   10 Units at 02/09/22 2141    linaCLOtide (LINZESS) capsule 72 mcg  72 mcg Oral QAM AC Ama Morris MD        magnesium hydroxide (MILK OF MAGNESIA) 400 MG/5ML suspension 30 mL  30 mL Oral Daily PRN Ama Morris MD        metoprolol succinate (TOPROL XL) extended release tablet 25 mg  25 mg Oral Daily Ama Morris MD   25 mg at 02/09/22 1222    midodrine (PROAMATINE) tablet 15 mg  15 mg Oral Once per day on Mon Wed Fri Simon Jeffrey MD   15 mg at 02/09/22 1222    midodrine (PROAMATINE) tablet 5 mg  5 mg Oral PRN Simon Jeffrey MD        polyethylene glycol [facility]) packet 17 g  17 g Oral Daily Simon Jeffrey MD   17 g at 02/09/22 1221    senna (SENOKOT) tablet 17.2 mg  2 tablet Oral BID Simon Jeffrey MD   17.2 mg at 02/09/22 2141    traZODone (DESYREL) tablet 25 mg  25 mg Oral Nightly Simon Jeffrey MD   25 mg at 02/09/22 2142    vitamin D (CHOLECALCIFEROL) tablet 2,000 Units  2,000 Units Oral Daily Simon Jeffrey MD   2,000 Units at 02/09/22 1223    sodium chloride flush 0.9 % injection 5-40 mL  5-40 mL IntraVENous 2 times per day Simon Jeffrey MD   10 mL at 02/09/22 1223    sodium chloride flush 0.9 % injection 5-40 mL  5-40 mL IntraVENous PRN Simon Jeffrey MD        0.9 % sodium chloride infusion  25 mL IntraVENous PRN Simon Jeffrey MD        heparin (porcine) injection 5,000 Units  5,000 Units SubCUTAneous 3 times per day Simon Jeffrey MD   5,000 Units at 02/10/22 0630    acetaminophen (TYLENOL) tablet 650 mg  650 mg Oral Q6H PRN Simon Jeffrey MD        Or   Lakisha Zavala acetaminophen (TYLENOL) suppository 650 mg  650 mg Rectal Q6H PRN Simon Jeffrey MD        glucose (GLUTOSE) 40 % oral gel 15 g  15 g Oral PRN Simon Jeffrey MD        dextrose 50 % IV solution  12.5 g IntraVENous PRN Simon Jeffrey MD        glucagon (rDNA) injection 1 mg  1 mg IntraMUSCular PRN Simon Jeffrey MD        dextrose 5 % solution  100 mL/hr IntraVENous PRN Simon Jeffrey MD        insulin lispro (HUMALOG) injection vial 0-12 Units  0-12 Units SubCUTAneous TID WC Simon Jeffrey MD        insulin lispro (HUMALOG) injection vial 0-6 Units  0-6 Units SubCUTAneous Nightly Simon Jeffrey MD   1 Units at 02/08/22 2116    albuterol (PROVENTIL) nebulizer solution 2.5 mg  2.5 mg Nebulization Q4H PRN Simon Jeffrey MD        alogliptin (NESINA) tablet 6.25 mg  6.25 mg Oral Daily Simon Jeffrey MD   6.25 mg at 02/09/22 [place] (PERCOCET) 5-325 MG per tablet 1 tablet  1 tablet Oral Q8H PRN Concetta Black MD   1 tablet at 02/10/22 0047    And    oxyCODONE (ROXICODONE) immediate release tablet 2.5 mg  2.5 mg Oral Q8H PRN Concetta Black MD          sodium chloride      dextrose         Physical Exam:  /84   Pulse 92   Temp 98 °F (36.7 °C) (Oral)   Resp 16   Ht 5' 1\" (1.549 m)   Wt 205 lb 0.4 oz (93 kg)   SpO2 95%   BMI 38.74 kg/m²   Weight change: -14.1 oz (-0.4 kg)  Wt Readings from Last 3 Encounters:   02/10/22 205 lb 0.4 oz (93 kg)   01/05/22 196 lb (88.9 kg)   12/07/21 203 lb 14.4 oz (92.5 kg)     The patient is awake, alert and in no discomfort or distress. No gross musculoskeletal deformity is present. No significant skin or nail changes are present. Gross examination of head, eyes, nose and throat are negative. Jugular venous pressure is normal and no carotid bruits are present. Normal respiratory effort is noted with no accessory muscle usage present. Lung fields are clear to ascultation. Cardiac examination is notable for a regular rate and rhythm with no palpable thrill. No gallop rhythm or cardiac murmur are identified. A benign abdominal examination is present with the exception of obesity and no masses or organomegaly. Intact pulses are present throughout all extremities and no peripheral edema is present. No focal neurologic deficits are present. Intake/Output:    Intake/Output Summary (Last 24 hours) at 2/10/2022 0713  Last data filed at 2/10/2022 0100  Gross per 24 hour   Intake 480 ml   Output 1450 ml   Net -970 ml     No intake/output data recorded.     Laboratory Tests:  Lab Results   Component Value Date    CREATININE 3.2 (H) 02/10/2022    BUN 12 02/10/2022     02/10/2022    K 4.1 02/10/2022     02/10/2022    CO2 25 02/10/2022     Recent Labs     02/07/22  1516   CKTOTAL 25  24   CKMB 3.4  3.5     No results found for: BNP  Lab Results   Component Value Date    WBC 13.0 02/10/2022    RBC 2.70 02/10/2022    HGB 8.9 02/10/2022    HCT 28.9 02/10/2022    .0 02/10/2022    MCH 33.0 02/10/2022    MCHC 30.8 02/10/2022    RDW 15.4 02/10/2022     02/10/2022    MPV 10.5 02/10/2022     Recent Labs     02/08/22  0350 02/09/22  0447 02/10/22  0406   ALKPHOS 85 82 83   ALT 9 9 7   AST 16 16 15   PROT 6.1* 6.3* 6.4   BILITOT 0.3 0.3 0.3   LABALBU 3.0* 3.0* 3.0*     Lab Results   Component Value Date    MG 2.0 02/10/2022     Lab Results   Component Value Date    PROTIME 10.8 12/05/2021    PROTIME 15.3 06/25/2011    INR 1.0 12/05/2021     Lab Results   Component Value Date    TSH 1.945 08/16/2013     No components found for: CHLPL  Lab Results   Component Value Date    TRIG 181 (H) 10/12/2011    TRIG 268 (H) 06/26/2011     Lab Results   Component Value Date    HDL 37.0 (A) 10/12/2011    HDL 7.0 (A) 06/26/2011     Lab Results   Component Value Date    LDLCALC 118 (H) 10/12/2011    LDLCALC 36 06/26/2011       Cardiac Tests:  Telemetry findings reviewed: Sinus rhythm with no documented bradycardia arrhythmias(heart rates of approximately 40 bpm numerically reported on arrhythmia monitoring and factitious with heart rates actually approximately 75 bpm), no new tachy/bradyarrhythmias overnight      ASSESSMENT / PLAN: On a clinical basis, the patient appears subjectively stable from a cardiovascular standpoint with no interim cardiovascular complications and the reports of bradycardia both within the nephrology notes and by heart rates reported on numeric arrhythmia monitoring screens factitious. Presently, her existing medical regimen inclusive for beta-blocker will be maintained with her perfusion imaging study pending in regards to ischemic prognostic assessment.   Unless dictated by findings, no additional cardiovascular assessment will be indicated with ongoing needs of appropriate lifestyle modification to achieve weight reduction and that of appropriate assessment of the presence or absence of obstructive sleep apnea following discharge. In addition, aggressive risk factor modification of blood pressure, diabetes and serum lipids will be essential to reducing risk of future atherosclerotic development. Unless dictated by the findings of her perfusion imaging study, we will further evaluate her during hospitalization with arrangements to be made for follow-up with her primary cardiologist, Bernice Cobian Note: This report was completed utilizing computer voice recognition software. Every effort has been made to ensure accuracy, however; inadvertent computerized transcription errors may be present. Kavita Mendez.  Sylvester Fernandez, [address] [place] [address] Cardiology

## 2022-02-10 NOTE — PROGRESS NOTES
Nephrology Progress Note  Patient's Name: Livia Hill  3:01 PM  2/10/2022    Nephrologist: Vadim Madrid    Reason for Consult:  ESKD      History of Present Ilness from the 2/7/22 note:    Livia Hill is a 66 y.o. female with prior history ESKD receiving IHD on an MWF schedule. She was sent in from the Dialysis. Pt states she felt like she had to go to sleep and the next thing she remembers is waking up and EMS was at dialysis taking care of her. She denies CP or SOB. I spoke with the RN at outpt dialysis and she stated the pt had a decreased LOC and when checked her initial HR was 27. She was bolused with IVF apprx 1L 0.9NS  And EMS was called. Her HR was back. She is on metoprolol as an outpt. She had 3 of the 4hrs of her dialysis treatment     2/10/22: Pt awake alert states she feels well tolerated the stress test well    Past Medical History:   Diagnosis Date    Anemia in chronic kidney disease     Anxiety and depression     Arthritis     Chronic pain syndrome     COVID-19 05/2020    COVID-19     [facility]     Dysphagia, oral phase     GERD (gastroesophageal reflux disease)     Hemodialysis patient ([facility]     M-W-F    Hyperkalemia     Hypertension     Hypertensive kidney disease with stage 4 chronic kidney disease (HCC)     Insomnia     Kidney stones     MDD (major depressive disorder)     Moderate protein-calorie malnutrition (HCC)     Morbid obesity (HCC)     Muscle weakness (generalized)     Obesity     Pressure ulcer of sacral region     Sacral pressure ulcer 08/03/2021    stage 4    Unspecified osteoarthritis, unspecified site     Weakness generalized        Past Surgical History:   Procedure Laterality Date    ABDOMEN SURGERY N/A 5/4/2020    SACRAL WOUND DEBRIDEMENT CALL   WITH TIME AVAIL AM performed by Feliciano Blair MD at [address]  x3    CHOLECYSTECTOMY  3/31/16    Laparoscopic-Dr. Luis Barclay    CYSTOSCOPY  2011     for kidney stones    DIALYSIS FISTULA CREATION Left 8/5/2021    INSERTION FISTULA LEFT ARM performed by Cindy Lindsay MD at [facility] Right 11/18/2021    REVISION AV FISTULA LEFT ARM performed by Cindy Lindsay MD at [place] [id number]  2009     right     UPPER GASTROINTESTINAL ENDOSCOPY  2.18.15    Dr. Saint Parr Findings: Mild Gastrits and Duodenitis, 2cm Hiatal Hernia    UPPER GASTROINTESTINAL ENDOSCOPY N/A 5/8/2020    EGD CONTROL HEMORRHAGE performed by Kim Bhakta MD at [facility] N/A 5/22/2020    EGD BEDSIDE performed by Zohra Argueta MD at [facility] [address] N/A 5/6/2021    INSERTION TUNNELED DIALYSIS CATHETER performed by Cindy Lindsay MD at [facility] OR       Family History   Problem Relation Age of Onset    Cancer Sister         reports that she quit smoking about 10 years ago. She has a 30.00 pack-year smoking history. She has never used smokeless tobacco. She reports that she does not drink alcohol and does not use drugs. Allergies:  Patient has no known allergies.     Current Medications:    prochlorperazine (COMPAZINE) injection 10 mg, Q6H PRN  atorvastatin (LIPITOR) tablet 40 mg, Nightly  epoetin derek-epbx (RETACRIT) injection 3,000 Units, Once per day on Mon Wed Fri  gabapentin (NEURONTIN) capsule 300 mg, Q MWF  diphenhydrAMINE (BENADRYL) tablet 25 mg, Q6H PRN  perflutren lipid microspheres (DEFINITY) injection 1.65 mg, ONCE PRN  bisacodyl (DULCOLAX) EC tablet 10 mg, Daily PRN  busPIRone (BUSPAR) tablet 10 mg, BID  busPIRone (BUSPAR) tablet 5 mg, Nightly  docusate sodium (COLACE) capsule 100 mg, BID  escitalopram (LEXAPRO) tablet 10 mg, Daily  Ferric Citrate TABS 420 mg, TID WC  insulin glargine (LANTUS) injection vial 10 Units, Nightly  linaCLOtide (LINZESS) capsule 72 mcg, QAM AC  magnesium hydroxide (MILK OF MAGNESIA) 400 MG/5ML suspension 30 mL, Daily PRN  metoprolol succinate (TOPROL XL) extended release m/r/g  Respiratory: decreased BS at the bases  Abdomen:  +bs, soft, nt, nd  Ext: 1+ bilat lower extremity edema  Psychiatric: mood and affect appropriate    Data:   Labs:  CBC:   Lab Results   Component Value Date    WBC 13.0 02/10/2022    RBC 2.70 02/10/2022    HGB 8.9 02/10/2022    HCT 28.9 02/10/2022    .0 02/10/2022    MCH 33.0 02/10/2022    MCHC 30.8 02/10/2022    RDW 15.4 02/10/2022     02/10/2022    MPV 10.5 02/10/2022     CBC with Differential:    Lab Results   Component Value Date    WBC 13.0 02/10/2022    RBC 2.70 02/10/2022    HGB 8.9 02/10/2022    HCT 28.9 02/10/2022     02/10/2022    .0 02/10/2022    MCH 33.0 02/10/2022    MCHC 30.8 02/10/2022    RDW 15.4 02/10/2022    NRBC 0.9 05/25/2020    SEGSPCT 71 08/16/2013    BANDSPCT 1 03/29/2016    METASPCT 0.9 05/10/2020    LYMPHOPCT 8.3 02/07/2022    PROMYELOPCT 0.9 05/09/2020    MONOPCT 9.0 02/07/2022    MYELOPCT 1.7 05/11/2020    BASOPCT 0.5 02/07/2022    MONOSABS 0.83 02/07/2022    LYMPHSABS 0.76 02/07/2022    EOSABS 0.15 02/07/2022    BASOSABS 0.05 02/07/2022     Hemoglobin/Hematocrit:    Lab Results   Component Value Date    HGB 8.9 02/10/2022    HCT 28.9 02/10/2022     CMP:    Lab Results   Component Value Date     02/10/2022    K 4.1 02/10/2022    K 4.9 02/07/2022     02/10/2022    CO2 25 02/10/2022    BUN 12 02/10/2022    CREATININE 3.2 02/10/2022    GFRAA 17 02/10/2022    LABGLOM 14 02/10/2022    GLUCOSE 129 02/10/2022    GLUCOSE 149 10/12/2011    PROT 6.4 02/10/2022    LABALBU 3.0 02/10/2022    LABALBU 4.1 10/12/2011    CALCIUM 8.7 02/10/2022    BILITOT 0.3 02/10/2022    ALKPHOS 83 02/10/2022    AST 15 02/10/2022    ALT 7 02/10/2022     BMP:    Lab Results   Component Value Date     02/10/2022    K 4.1 02/10/2022    K 4.9 02/07/2022     02/10/2022    CO2 25 02/10/2022    BUN 12 02/10/2022    LABALBU 3.0 02/10/2022    LABALBU 4.1 10/12/2011    CREATININE 3.2 02/10/2022    CALCIUM 8.7 02/10/2022 GFRAA 17 02/10/2022    LABGLOM 14 02/10/2022    GLUCOSE 129 02/10/2022    GLUCOSE 149 10/12/2011     BUN/Creatinine:    Lab Results   Component Value Date    BUN 12 02/10/2022    CREATININE 3.2 02/10/2022     Hepatic Function Panel:    Lab Results   Component Value Date    ALKPHOS 83 02/10/2022    ALT 7 02/10/2022    AST 15 02/10/2022    PROT 6.4 02/10/2022    BILITOT 0.3 02/10/2022    BILIDIR <0.2 01/07/2021    IBILI see below 01/07/2021    LABALBU 3.0 02/10/2022    LABALBU 4.1 10/12/2011     Albumin:    Lab Results   Component Value Date    LABALBU 3.0 02/10/2022    LABALBU 4.1 10/12/2011     Calcium:    Lab Results   Component Value Date    CALCIUM 8.7 02/10/2022     Ionized Calcium:    Lab Results   Component Value Date    IONCA 1.59 06/26/2013     Magnesium:    Lab Results   Component Value Date    MG 2.0 02/10/2022     Phosphorus:    Lab Results   Component Value Date    PHOS 3.9 02/10/2022     LDH:    Lab Results   Component Value Date     05/24/2020     Uric Acid:    Lab Results   Component Value Date    LABURIC 7.7 07/19/2019    URICACID 11.6 10/12/2011     PT/INR:    Lab Results   Component Value Date    PROTIME 10.8 12/05/2021    PROTIME 15.3 06/25/2011    INR 1.0 12/05/2021     PTT:    Lab Results   Component Value Date    APTT 28.3 12/05/2021   [APTT}  Troponin:    Lab Results   Component Value Date    TROPONINI 0.09 10/09/2020     U/A:    Lab Results   Component Value Date    COLORU Yellow 05/03/2021    PROTEINU >=300 05/03/2021    PHUR 7.0 05/03/2021    WBCUA PACKED 05/03/2021    WBCUA 5-10 08/17/2011    RBCUA 0-1 05/03/2021    RBCUA 0-1 06/26/2013    YEAST Present 08/11/2020    BACTERIA MODERATE 05/03/2021    CLARITYU Clear 05/03/2021    SPECGRAV 1.020 05/03/2021    LEUKOCYTESUR LARGE 05/03/2021    UROBILINOGEN 0.2 05/03/2021    BILIRUBINUR Negative 05/03/2021    BILIRUBINUR NEGATIVE 08/17/2011    BLOODU SMALL 05/03/2021    GLUCOSEU Negative 05/03/2021    GLUCOSEU NEGATIVE 08/17/2011 AMORPHOUS FEW 06/25/2011     ABG:    Lab Results   Component Value Date    PH 7.433 05/10/2020    PH 7.377 06/27/2011    PCO2 31.0 05/10/2020    PO2 106.3 05/10/2020    HCO3 20.3 05/10/2020    BE -3.2 05/10/2020    O2SAT 97.5 05/10/2020     HgBA1c:    Lab Results   Component Value Date    LABA1C 6.9 05/18/2020     Microalbumen/Creatinine ratio:  No components found for: RUCREAT  FLP:    Lab Results   Component Value Date    TRIG 181 10/12/2011    HDL 37.0 10/12/2011    LDLCALC 118 10/12/2011     TSH:    Lab Results   Component Value Date    TSH 1.945 08/16/2013     VITAMIN B12: No components found for: B12  FOLATE:    Lab Results   Component Value Date    FOLATE >20.0 05/05/2021     Iron Saturation:  No components found for: PERCENTFE  FERRITIN:    Lab Results   Component Value Date    FERRITIN 767 05/05/2021     AMYLASE:    Lab Results   Component Value Date    AMYLASE 20 07/02/2011     LIPASE:    Lab Results   Component Value Date    LIPASE 14 01/31/2020     Fibrinogen Level:  No components found for: FIB  Urine Toxicology:  No components found for: IAMMENTA, IBARBIT, IBENZO, ICOCAINE, IMARTHC, IOPIATES, IPHENCYC  24 Hour Urine for Protein:  No components found for: RAWUPRO, UHRS3, EIDU23JU, UTV3  24 Hour Urine for Creatinine Clearance:  No components found for: CREAT4, UHRS10, UTV10     Imaging:    EXAMINATION:  ONE XRAY VIEW OF THE CHEST PORTABLE UPRIGHT     2/7/2022 12:20 pm     COMPARISON:  05/06/2021     HISTORY:  ORDERING SYSTEM PROVIDED HISTORY: syncope  TECHNOLOGIST PROVIDED HISTORY:  Reason for exam:->syncope     FINDINGS:  Chronic mild elevation of the right hemidiaphragm.  Cardiomegaly and mitral  annular calcification, unchanged.  No acute pulmonary infiltrate.  No overt  vascular congestion.  No pleural effusion or pneumothorax.  Calcified  mediastinal and hilar lymph nodes, unchanged.  Atherosclerotic thoracic aorta.        Impression  1.  No acute cardiopulmonary disease.  No significant change.     2.  Mild cardiomegaly. 2D ECHO 2/8/22:  Summary    Technically limited study with off axis images.    Left ventricular internal dimensions were normal in diastole and systole.    Abnormal (paradoxical) motion consistent with conduction abnormality.    No regional wall motion abnormalities seen.    The left atrium is moderately dilated.    Focal calcification mitral valve leaflets and subvalvular structures.    Severe mitral annular calcification.    Mild mitral regurgitation is present.    Moderate functional mitral stenosis .    The aortic valve appears mildly sclerotic. Assessment  1-ESKD requiring KRT with IHD on treatment MWF via R IJ TDC  PLAN:  1. OK for D/C- IHD as an outpt tomorrow-will follow up in the outpt dialysis unit    2-Hyperkalemia  K+4.1  PLAN:  1. Follow K+    3-Anemia in CKD  HgB below goal of 10-12  PLAN:  1. Continue   ARUN at the out pt unit  2. Follow H/H    4. 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 Correct Ca++ with IHD  2. Follow Ca++ and PO4  3. Continue on the home ferric Citrate as binder    5. Syncopal Event at IHD  with associated bradycardia-recurrent episode of bradycardia on IHD today  2/10/22 Stress test (-) for reversible ischemia  PLAN:  1.  Defer to Cardiology    Thank you Dr. Mary Morfin MD for allowing us to participate in care of 405Danni Reis Philip Adrian MD  3:01 PM  2/10/2022

## 2022-03-16 NOTE — DISCHARGE SUMMARY
Admit Date: 2/7/2022 11:12 AM   Discharge Date: 2/10/2022    Patient Active Problem List   Diagnosis    Neurologic gait dysfunction    [facility] failure, acute on chronic [facility]    [facility]    Hypertension    Arthritis    Kidney disease    Knee problem    Anemia due to stage 3 chronic kidney disease    Primary osteoarthritis of both knees    Acute on chronic renal insufficiency    Acute kidney injury superimposed on CKD ([facility]    Acute renal failure superimposed on chronic kidney disease, on chronic dialysis ([facility]    2019 novel coronavirus disease (COVID-19)    Moderate protein-calorie malnutrition ([facility]    PERLA (acute kidney injury) ([facility]    Stage 5 chronic kidney disease on chronic dialysis (HCC)    Pressure injury of sacral region, stage 4 ([facility]    Pressure injury of right hip, stage 3 (HCC)    Syncope and collapse    Unresponsive episode    Left bundle branch block    Pure hypercholesterolemia    Moderate obesity        Present on Admission:   Syncope and collapse   Unresponsive episode   Left bundle branch block   Pure hypercholesterolemia   Moderate obesity   Stage 5 chronic kidney disease on chronic dialysis ([facility]          Medication List      CONTINUE taking these medications    acetaminophen 500 MG tablet  Commonly known as: TYLENOL     Auryxia 1  MG(Fe) Tabs  Generic drug: Ferric Citrate     Basaglar KwikPen 100 UNIT/ML injection pen  Generic drug: insulin glargine     bisacodyl 5 MG EC tablet  Commonly known as: DULCOLAX     * busPIRone 5 MG tablet  Commonly known as: BUSPAR     * busPIRone 10 MG tablet  Commonly known as: BUSPAR     docusate sodium 100 MG capsule  Commonly known as: COLACE     escitalopram 5 MG tablet  Commonly known as: LEXAPRO     gabapentin 300 MG capsule  Commonly known as: NEURONTIN     glucagon 1 MG injection     insulin lispro 100 UNIT/ML injection vial  Commonly known as: HUMALOG     Linzess 72 MCG Caps capsule  Generic drug: linaCLOtide     magnesium hydroxide 400 MG/5ML suspension  Commonly known as: MILK OF MAGNESIA     metoprolol succinate 25 MG extended release tablet  Commonly known as: TOPROL XL  Take 1 tablet by mouth daily     * midodrine 5 MG tablet  Commonly known as: PROAMATINE     * midodrine 5 MG tablet  Commonly known as: PROAMATINE     neomycin-bacitracin-polymyxin 5-400-5000 ointment     oxyCODONE-acetaminophen 7.5-325 MG per tablet  Commonly known as: PERCOCET     polyethylene glycol 17 g packet  Commonly known as: GLYCOLAX     senna 8.6 MG tablet  Commonly known as: SENOKOT     Tradjenta 5 MG tablet  Generic drug: linagliptin     traZODone 50 MG tablet  Commonly known as: DESYREL     Ventolin  (90 Base) MCG/ACT inhaler  Generic drug: albuterol sulfate HFA     Zofran ODT 4 MG disintegrating tablet  Generic drug: ondansetron         * This list has 4 medication(s) that are the same as other medications prescribed for you. Read the directions carefully, and ask your doctor or other care provider to review them with you. ASK your doctor about these medications    vitamin D 50 MCG (2000 UT) Tabs tablet  Commonly known as: CHOLECALCIFEROL  Take 1 tablet by mouth daily             Hospital Course/Procedures:66year-old on chronic hemodialysis was sent to the emergency room after having a syncopal episode during dialysis on 2/7/2022. Patient states she gets faint frequently at dialysis but this was the first time she hasn't lost consciousness. She was already on Midodrine for this. Patient was admitted to telemetry where she showed no significant arrhythmias. Cardiology was consulted. 2-D echo and stress test showed no acute abnormalities. Patient underwent hemodialysis while in the hospital without any events.   Patient was discharged back to skilled nursing facility in stable condition on 2/10/2022    Consultants Following:cardiology and nephrology    [address] [place] [address]    Follow-up:she will be followed by the facility physician      Fany Zavaleta MD  3/16/2022  7:15 PM

## 2022-04-04 ENCOUNTER — CLINICAL DOCUMENTATION (OUTPATIENT)
Dept: GENERAL RADIOLOGY | Age: 72
End: 2022-04-04

## 2022-04-04 NOTE — PROGRESS NOTES
Spoke with Schuyler Dailey at [facility]. Rx will be faxed to [facility]  today, for 6 month follow up appointment tomorrow.

## 2022-04-11 ENCOUNTER — HOSPITAL ENCOUNTER (INPATIENT)
Age: 72
LOS: 2 days | Discharge: NURSING, REHAB OR LONG-TERM CARE | DRG: 871 | End: 2022-04-14
Attending: EMERGENCY MEDICINE | Admitting: FAMILY MEDICINE
Payer: COMMERCIAL

## 2022-04-11 DIAGNOSIS — N18.9 CHRONIC KIDNEY DISEASE, UNSPECIFIED CKD STAGE: ICD-10-CM

## 2022-04-11 DIAGNOSIS — A41.9 SEPSIS WITHOUT ACUTE ORGAN DYSFUNCTION, DUE TO UNSPECIFIED ORGANISM (HCC): Primary | ICD-10-CM

## 2022-04-11 DIAGNOSIS — L89.154 PRESSURE INJURY OF SACRAL REGION, STAGE 4 (HCC): ICD-10-CM

## 2022-04-11 LAB
LACTIC ACID, SEPSIS: 1.9 MMOL/L (ref 0.5–1.9)
PH VENOUS: 7.48 (ref 7.35–7.45)

## 2022-04-11 PROCEDURE — 80053 COMPREHEN METABOLIC PANEL: CPT

## 2022-04-11 PROCEDURE — 87077 CULTURE AEROBIC IDENTIFY: CPT

## 2022-04-11 PROCEDURE — 85025 COMPLETE CBC W/AUTO DIFF WBC: CPT

## 2022-04-11 PROCEDURE — 87150 DNA/RNA AMPLIFIED PROBE: CPT

## 2022-04-11 PROCEDURE — 87186 SC STD MICRODIL/AGAR DIL: CPT

## 2022-04-11 PROCEDURE — 84100 ASSAY OF PHOSPHORUS: CPT

## 2022-04-11 PROCEDURE — 2580000003 HC RX 258: Performed by: STUDENT IN AN ORGANIZED HEALTH CARE EDUCATION/TRAINING PROGRAM

## 2022-04-11 PROCEDURE — 83735 ASSAY OF MAGNESIUM: CPT

## 2022-04-11 PROCEDURE — 83605 ASSAY OF LACTIC ACID: CPT

## 2022-04-11 PROCEDURE — P9612 CATHETERIZE FOR URINE SPEC: HCPCS

## 2022-04-11 PROCEDURE — 82010 KETONE BODYS QUAN: CPT

## 2022-04-11 PROCEDURE — 82800 BLOOD PH: CPT

## 2022-04-11 PROCEDURE — 6370000000 HC RX 637 (ALT 250 FOR IP): Performed by: STUDENT IN AN ORGANIZED HEALTH CARE EDUCATION/TRAINING PROGRAM

## 2022-04-11 PROCEDURE — 93005 ELECTROCARDIOGRAM TRACING: CPT | Performed by: STUDENT IN AN ORGANIZED HEALTH CARE EDUCATION/TRAINING PROGRAM

## 2022-04-11 PROCEDURE — 6360000002 HC RX W HCPCS: Performed by: STUDENT IN AN ORGANIZED HEALTH CARE EDUCATION/TRAINING PROGRAM

## 2022-04-11 PROCEDURE — 96374 THER/PROPH/DIAG INJ IV PUSH: CPT

## 2022-04-11 PROCEDURE — 87040 BLOOD CULTURE FOR BACTERIA: CPT

## 2022-04-11 PROCEDURE — 99285 EMERGENCY DEPT VISIT HI MDM: CPT

## 2022-04-11 RX ORDER — 0.9 % SODIUM CHLORIDE 0.9 %
1000 INTRAVENOUS SOLUTION INTRAVENOUS ONCE
Status: COMPLETED | OUTPATIENT
Start: 2022-04-11 | End: 2022-04-12

## 2022-04-11 RX ORDER — ACETAMINOPHEN 500 MG
1000 TABLET ORAL ONCE
Status: COMPLETED | OUTPATIENT
Start: 2022-04-11 | End: 2022-04-11

## 2022-04-11 RX ORDER — MORPHINE SULFATE 4 MG/ML
4 INJECTION, SOLUTION INTRAMUSCULAR; INTRAVENOUS ONCE
Status: COMPLETED | OUTPATIENT
Start: 2022-04-11 | End: 2022-04-11

## 2022-04-11 RX ADMIN — MORPHINE SULFATE 4 MG: 4 INJECTION, SOLUTION INTRAMUSCULAR; INTRAVENOUS at 23:16

## 2022-04-11 RX ADMIN — SODIUM CHLORIDE 1000 ML: 9 INJECTION, SOLUTION INTRAVENOUS at 23:14

## 2022-04-11 RX ADMIN — ACETAMINOPHEN 1000 MG: 500 TABLET ORAL at 23:17

## 2022-04-11 ASSESSMENT — PAIN SCALES - GENERAL
PAINLEVEL_OUTOF10: 6
PAINLEVEL_OUTOF10: 0
PAINLEVEL_OUTOF10: 8

## 2022-04-11 ASSESSMENT — PAIN - FUNCTIONAL ASSESSMENT: PAIN_FUNCTIONAL_ASSESSMENT: 0-10

## 2022-04-11 ASSESSMENT — PAIN DESCRIPTION - LOCATION: LOCATION: BUTTOCKS

## 2022-04-12 ENCOUNTER — APPOINTMENT (OUTPATIENT)
Dept: CT IMAGING | Age: 72
DRG: 871 | End: 2022-04-12
Payer: COMMERCIAL

## 2022-04-12 ENCOUNTER — APPOINTMENT (OUTPATIENT)
Dept: GENERAL RADIOLOGY | Age: 72
DRG: 871 | End: 2022-04-12
Payer: COMMERCIAL

## 2022-04-12 PROBLEM — A41.9 SEPSIS (HCC): Status: ACTIVE | Noted: 2022-04-12

## 2022-04-12 LAB
ALBUMIN SERPL-MCNC: 2.6 G/DL (ref 3.5–5.2)
ALP BLD-CCNC: 78 U/L (ref 35–104)
ALT SERPL-CCNC: 11 U/L (ref 0–32)
ANION GAP SERPL CALCULATED.3IONS-SCNC: 10 MMOL/L (ref 7–16)
ANTISTREPTOLYSIN-O: 34 IU/ML (ref 0–200)
AST SERPL-CCNC: 26 U/L (ref 0–31)
BACTERIA: ABNORMAL /HPF
BASOPHILS ABSOLUTE: 0.01 E9/L (ref 0–0.2)
BASOPHILS RELATIVE PERCENT: 0.1 % (ref 0–2)
BETA-HYDROXYBUTYRATE: 0.23 MMOL/L (ref 0.02–0.27)
BILIRUB SERPL-MCNC: 0.3 MG/DL (ref 0–1.2)
BILIRUBIN URINE: NEGATIVE
BLOOD, URINE: ABNORMAL
BUN BLDV-MCNC: 34 MG/DL (ref 6–23)
C-REACTIVE PROTEIN: 21.1 MG/DL (ref 0–0.4)
CALCIUM SERPL-MCNC: 8.9 MG/DL (ref 8.6–10.2)
CHLORIDE BLD-SCNC: 97 MMOL/L (ref 98–107)
CLARITY: ABNORMAL
CO2: 27 MMOL/L (ref 22–29)
COLOR: YELLOW
CREAT SERPL-MCNC: 3.9 MG/DL (ref 0.5–1)
EKG ATRIAL RATE: 102 BPM
EKG P AXIS: 75 DEGREES
EKG P-R INTERVAL: 192 MS
EKG Q-T INTERVAL: 400 MS
EKG QRS DURATION: 148 MS
EKG QTC CALCULATION (BAZETT): 521 MS
EKG R AXIS: -30 DEGREES
EKG T AXIS: 125 DEGREES
EKG VENTRICULAR RATE: 102 BPM
EOSINOPHILS ABSOLUTE: 0.01 E9/L (ref 0.05–0.5)
EOSINOPHILS RELATIVE PERCENT: 0.1 % (ref 0–6)
EPITHELIAL CELLS, UA: ABNORMAL /HPF
GFR AFRICAN AMERICAN: 14
GFR NON-AFRICAN AMERICAN: 11 ML/MIN/1.73
GLUCOSE BLD-MCNC: 212 MG/DL (ref 74–99)
GLUCOSE URINE: NEGATIVE MG/DL
HCT VFR BLD CALC: 29.3 % (ref 34–48)
HEMOGLOBIN: 9.2 G/DL (ref 11.5–15.5)
IMMATURE GRANULOCYTES #: 0.06 E9/L
IMMATURE GRANULOCYTES %: 0.8 % (ref 0–5)
INFLUENZA A BY PCR: NOT DETECTED
INFLUENZA B BY PCR: NOT DETECTED
KETONES, URINE: NEGATIVE MG/DL
LEUKOCYTE ESTERASE, URINE: ABNORMAL
LYMPHOCYTES ABSOLUTE: 0.47 E9/L (ref 1.5–4)
LYMPHOCYTES RELATIVE PERCENT: 6 % (ref 20–42)
MAGNESIUM: 2.2 MG/DL (ref 1.6–2.6)
MCH RBC QN AUTO: 33.3 PG (ref 26–35)
MCHC RBC AUTO-ENTMCNC: 31.4 % (ref 32–34.5)
MCV RBC AUTO: 106.2 FL (ref 80–99.9)
METER GLUCOSE: 137 MG/DL (ref 74–99)
METER GLUCOSE: 77 MG/DL (ref 74–99)
MONOCYTES ABSOLUTE: 0.9 E9/L (ref 0.1–0.95)
MONOCYTES RELATIVE PERCENT: 11.5 % (ref 2–12)
NEUTROPHILS ABSOLUTE: 6.38 E9/L (ref 1.8–7.3)
NEUTROPHILS RELATIVE PERCENT: 81.5 % (ref 43–80)
NITRITE, URINE: NEGATIVE
OVALOCYTES: ABNORMAL
PDW BLD-RTO: 15.8 FL (ref 11.5–15)
PH UA: 7 (ref 5–9)
PHOSPHORUS: 3.1 MG/DL (ref 2.5–4.5)
PLATELET # BLD: 187 E9/L (ref 130–450)
PMV BLD AUTO: 10 FL (ref 7–12)
POIKILOCYTES: ABNORMAL
POTASSIUM SERPL-SCNC: 4.2 MMOL/L (ref 3.5–5)
POTASSIUM SERPL-SCNC: 5.1 MMOL/L (ref 3.5–5)
PROTEIN UA: >=300 MG/DL
RBC # BLD: 2.76 E12/L (ref 3.5–5.5)
RBC UA: >20 /HPF (ref 0–2)
REASON FOR REJECTION: NORMAL
REJECTED TEST: NORMAL
SARS-COV-2, NAAT: NOT DETECTED
SEDIMENTATION RATE, ERYTHROCYTE: 123 MM/HR (ref 0–20)
SODIUM BLD-SCNC: 134 MMOL/L (ref 132–146)
SPECIFIC GRAVITY UA: 1.02 (ref 1–1.03)
TOTAL PROTEIN: 6.7 G/DL (ref 6.4–8.3)
TROPONIN, HIGH SENSITIVITY: 130 NG/L (ref 0–9)
TROPONIN, HIGH SENSITIVITY: 172 NG/L (ref 0–9)
UROBILINOGEN, URINE: 0.2 E.U./DL
WBC # BLD: 7.8 E9/L (ref 4.5–11.5)
WBC UA: >20 /HPF (ref 0–5)

## 2022-04-12 PROCEDURE — 2580000003 HC RX 258: Performed by: STUDENT IN AN ORGANIZED HEALTH CARE EDUCATION/TRAINING PROGRAM

## 2022-04-12 PROCEDURE — 6360000002 HC RX W HCPCS: Performed by: SPECIALIST

## 2022-04-12 PROCEDURE — 84132 ASSAY OF SERUM POTASSIUM: CPT

## 2022-04-12 PROCEDURE — 2060000000 HC ICU INTERMEDIATE R&B

## 2022-04-12 PROCEDURE — 6360000002 HC RX W HCPCS: Performed by: STUDENT IN AN ORGANIZED HEALTH CARE EDUCATION/TRAINING PROGRAM

## 2022-04-12 PROCEDURE — 81001 URINALYSIS AUTO W/SCOPE: CPT

## 2022-04-12 PROCEDURE — 85651 RBC SED RATE NONAUTOMATED: CPT

## 2022-04-12 PROCEDURE — 87502 INFLUENZA DNA AMP PROBE: CPT

## 2022-04-12 PROCEDURE — 6360000004 HC RX CONTRAST MEDICATION: Performed by: RADIOLOGY

## 2022-04-12 PROCEDURE — 87147 CULTURE TYPE IMMUNOLOGIC: CPT

## 2022-04-12 PROCEDURE — 74177 CT ABD & PELVIS W/CONTRAST: CPT

## 2022-04-12 PROCEDURE — 80074 ACUTE HEPATITIS PANEL: CPT

## 2022-04-12 PROCEDURE — 2580000003 HC RX 258: Performed by: FAMILY MEDICINE

## 2022-04-12 PROCEDURE — 6370000000 HC RX 637 (ALT 250 FOR IP): Performed by: FAMILY MEDICINE

## 2022-04-12 PROCEDURE — 87186 SC STD MICRODIL/AGAR DIL: CPT

## 2022-04-12 PROCEDURE — 96366 THER/PROPH/DIAG IV INF ADDON: CPT

## 2022-04-12 PROCEDURE — 87070 CULTURE OTHR SPECIMN AEROBIC: CPT

## 2022-04-12 PROCEDURE — 71045 X-RAY EXAM CHEST 1 VIEW: CPT

## 2022-04-12 PROCEDURE — 96365 THER/PROPH/DIAG IV INF INIT: CPT

## 2022-04-12 PROCEDURE — 82962 GLUCOSE BLOOD TEST: CPT

## 2022-04-12 PROCEDURE — 36415 COLL VENOUS BLD VENIPUNCTURE: CPT

## 2022-04-12 PROCEDURE — 86140 C-REACTIVE PROTEIN: CPT

## 2022-04-12 PROCEDURE — 6360000002 HC RX W HCPCS: Performed by: FAMILY MEDICINE

## 2022-04-12 PROCEDURE — 96367 TX/PROPH/DG ADDL SEQ IV INF: CPT

## 2022-04-12 PROCEDURE — 87635 SARS-COV-2 COVID-19 AMP PRB: CPT

## 2022-04-12 PROCEDURE — 90935 HEMODIALYSIS ONE EVALUATION: CPT

## 2022-04-12 PROCEDURE — 86060 ANTISTREPTOLYSIN O TITER: CPT

## 2022-04-12 PROCEDURE — 87075 CULTR BACTERIA EXCEPT BLOOD: CPT

## 2022-04-12 PROCEDURE — 86706 HEP B SURFACE ANTIBODY: CPT

## 2022-04-12 PROCEDURE — 84484 ASSAY OF TROPONIN QUANT: CPT

## 2022-04-12 RX ORDER — DEXTROSE MONOHYDRATE 50 MG/ML
100 INJECTION, SOLUTION INTRAVENOUS PRN
Status: DISCONTINUED | OUTPATIENT
Start: 2022-04-12 | End: 2022-04-14 | Stop reason: HOSPADM

## 2022-04-12 RX ORDER — MIDODRINE HYDROCHLORIDE 5 MG/1
15 TABLET ORAL
Status: DISCONTINUED | OUTPATIENT
Start: 2022-04-13 | End: 2022-04-14 | Stop reason: HOSPADM

## 2022-04-12 RX ORDER — MIDODRINE HYDROCHLORIDE 5 MG/1
5 TABLET ORAL PRN
Status: DISCONTINUED | OUTPATIENT
Start: 2022-04-12 | End: 2022-04-14 | Stop reason: HOSPADM

## 2022-04-12 RX ORDER — ONDANSETRON 4 MG/1
4 TABLET, ORALLY DISINTEGRATING ORAL EVERY 4 HOURS PRN
Status: DISCONTINUED | OUTPATIENT
Start: 2022-04-12 | End: 2022-04-12

## 2022-04-12 RX ORDER — LIDOCAINE 40 MG/G
CREAM TOPICAL
Status: ON HOLD | COMMUNITY
End: 2022-11-08 | Stop reason: HOSPADM

## 2022-04-12 RX ORDER — ONDANSETRON 2 MG/ML
4 INJECTION INTRAMUSCULAR; INTRAVENOUS EVERY 6 HOURS PRN
Status: DISCONTINUED | OUTPATIENT
Start: 2022-04-12 | End: 2022-04-14 | Stop reason: HOSPADM

## 2022-04-12 RX ORDER — GABAPENTIN 100 MG/1
300 CAPSULE ORAL 3 TIMES DAILY
Status: DISCONTINUED | OUTPATIENT
Start: 2022-04-12 | End: 2022-04-14 | Stop reason: HOSPADM

## 2022-04-12 RX ORDER — DOCUSATE SODIUM 100 MG/1
100 CAPSULE, LIQUID FILLED ORAL 2 TIMES DAILY
Status: DISCONTINUED | OUTPATIENT
Start: 2022-04-12 | End: 2022-04-14 | Stop reason: HOSPADM

## 2022-04-12 RX ORDER — ACETAMINOPHEN 325 MG/1
650 TABLET ORAL EVERY 6 HOURS PRN
Status: DISCONTINUED | OUTPATIENT
Start: 2022-04-12 | End: 2022-04-14 | Stop reason: HOSPADM

## 2022-04-12 RX ORDER — ESCITALOPRAM OXALATE 10 MG/1
10 TABLET ORAL DAILY
Status: DISCONTINUED | OUTPATIENT
Start: 2022-04-12 | End: 2022-04-14 | Stop reason: HOSPADM

## 2022-04-12 RX ORDER — ALBUTEROL SULFATE 2.5 MG/3ML
2.5 SOLUTION RESPIRATORY (INHALATION) EVERY 4 HOURS PRN
Status: DISCONTINUED | OUTPATIENT
Start: 2022-04-12 | End: 2022-04-14 | Stop reason: HOSPADM

## 2022-04-12 RX ORDER — SENNA PLUS 8.6 MG/1
2 TABLET ORAL 2 TIMES DAILY
Status: DISCONTINUED | OUTPATIENT
Start: 2022-04-12 | End: 2022-04-14 | Stop reason: HOSPADM

## 2022-04-12 RX ORDER — TRAZODONE HYDROCHLORIDE 50 MG/1
25 TABLET ORAL NIGHTLY PRN
Status: DISCONTINUED | OUTPATIENT
Start: 2022-04-12 | End: 2022-04-14 | Stop reason: HOSPADM

## 2022-04-12 RX ORDER — SODIUM CHLORIDE 0.9 % (FLUSH) 0.9 %
5-40 SYRINGE (ML) INJECTION EVERY 12 HOURS SCHEDULED
Status: DISCONTINUED | OUTPATIENT
Start: 2022-04-12 | End: 2022-04-14 | Stop reason: HOSPADM

## 2022-04-12 RX ORDER — BUSPIRONE HYDROCHLORIDE 10 MG/1
10 TABLET ORAL 2 TIMES DAILY
Status: DISCONTINUED | OUTPATIENT
Start: 2022-04-12 | End: 2022-04-14 | Stop reason: HOSPADM

## 2022-04-12 RX ORDER — DEXTROSE MONOHYDRATE 25 G/50ML
12.5 INJECTION, SOLUTION INTRAVENOUS PRN
Status: DISCONTINUED | OUTPATIENT
Start: 2022-04-12 | End: 2022-04-12

## 2022-04-12 RX ORDER — HEPARIN SODIUM 10000 [USP'U]/ML
5000 INJECTION, SOLUTION INTRAVENOUS; SUBCUTANEOUS EVERY 8 HOURS SCHEDULED
Status: DISCONTINUED | OUTPATIENT
Start: 2022-04-12 | End: 2022-04-14 | Stop reason: HOSPADM

## 2022-04-12 RX ORDER — ALBUTEROL SULFATE 90 UG/1
2 AEROSOL, METERED RESPIRATORY (INHALATION) EVERY 4 HOURS PRN
Status: DISCONTINUED | OUTPATIENT
Start: 2022-04-12 | End: 2022-04-12

## 2022-04-12 RX ORDER — OXYCODONE AND ACETAMINOPHEN 7.5; 325 MG/1; MG/1
1 TABLET ORAL EVERY 8 HOURS PRN
Status: DISCONTINUED | OUTPATIENT
Start: 2022-04-12 | End: 2022-04-12

## 2022-04-12 RX ORDER — SODIUM CHLORIDE 0.9 % (FLUSH) 0.9 %
5-40 SYRINGE (ML) INJECTION PRN
Status: DISCONTINUED | OUTPATIENT
Start: 2022-04-12 | End: 2022-04-14 | Stop reason: HOSPADM

## 2022-04-12 RX ORDER — CHOLECALCIFEROL (VITAMIN D3) 50 MCG
2000 TABLET ORAL DAILY
Status: DISCONTINUED | OUTPATIENT
Start: 2022-04-12 | End: 2022-04-14 | Stop reason: HOSPADM

## 2022-04-12 RX ORDER — ONDANSETRON 4 MG/1
4 TABLET, ORALLY DISINTEGRATING ORAL EVERY 8 HOURS PRN
Status: DISCONTINUED | OUTPATIENT
Start: 2022-04-12 | End: 2022-04-14 | Stop reason: HOSPADM

## 2022-04-12 RX ORDER — CEFTRIAXONE 1 G/1
1000 INJECTION, POWDER, FOR SOLUTION INTRAMUSCULAR; INTRAVENOUS ONCE
Status: DISCONTINUED | OUTPATIENT
Start: 2022-04-12 | End: 2022-04-12

## 2022-04-12 RX ORDER — NICOTINE POLACRILEX 4 MG
15 LOZENGE BUCCAL PRN
Status: DISCONTINUED | OUTPATIENT
Start: 2022-04-12 | End: 2022-04-13 | Stop reason: ALTCHOICE

## 2022-04-12 RX ORDER — CEFTRIAXONE 1 G/1
1000 INJECTION, POWDER, FOR SOLUTION INTRAMUSCULAR; INTRAVENOUS ONCE
Status: ON HOLD | COMMUNITY
Start: 2022-04-11 | End: 2022-04-14 | Stop reason: HOSPADM

## 2022-04-12 RX ORDER — INSULIN GLARGINE 100 [IU]/ML
10 INJECTION, SOLUTION SUBCUTANEOUS NIGHTLY
Status: DISCONTINUED | OUTPATIENT
Start: 2022-04-12 | End: 2022-04-14 | Stop reason: HOSPADM

## 2022-04-12 RX ORDER — ACETAMINOPHEN 650 MG/1
650 SUPPOSITORY RECTAL EVERY 6 HOURS PRN
Status: DISCONTINUED | OUTPATIENT
Start: 2022-04-12 | End: 2022-04-14 | Stop reason: HOSPADM

## 2022-04-12 RX ORDER — OXYCODONE HYDROCHLORIDE 5 MG/1
2.5 TABLET ORAL EVERY 8 HOURS PRN
Status: DISCONTINUED | OUTPATIENT
Start: 2022-04-12 | End: 2022-04-14 | Stop reason: HOSPADM

## 2022-04-12 RX ORDER — ACETAMINOPHEN 500 MG
1000 TABLET ORAL EVERY 8 HOURS PRN
Status: DISCONTINUED | OUTPATIENT
Start: 2022-04-12 | End: 2022-04-12

## 2022-04-12 RX ORDER — POLYETHYLENE GLYCOL 3350 17 G/17G
17 POWDER, FOR SOLUTION ORAL DAILY
Status: DISCONTINUED | OUTPATIENT
Start: 2022-04-12 | End: 2022-04-14 | Stop reason: HOSPADM

## 2022-04-12 RX ORDER — SODIUM CHLORIDE 9 MG/ML
INJECTION, SOLUTION INTRAVENOUS PRN
Status: DISCONTINUED | OUTPATIENT
Start: 2022-04-12 | End: 2022-04-14 | Stop reason: HOSPADM

## 2022-04-12 RX ORDER — METOPROLOL SUCCINATE 25 MG/1
25 TABLET, EXTENDED RELEASE ORAL DAILY
Status: DISCONTINUED | OUTPATIENT
Start: 2022-04-12 | End: 2022-04-14 | Stop reason: HOSPADM

## 2022-04-12 RX ORDER — POLYETHYLENE GLYCOL 3350 17 G/17G
17 POWDER, FOR SOLUTION ORAL DAILY PRN
Status: DISCONTINUED | OUTPATIENT
Start: 2022-04-12 | End: 2022-04-14 | Stop reason: HOSPADM

## 2022-04-12 RX ORDER — BUSPIRONE HYDROCHLORIDE 5 MG/1
5 TABLET ORAL NIGHTLY
Status: DISCONTINUED | OUTPATIENT
Start: 2022-04-12 | End: 2022-04-14 | Stop reason: HOSPADM

## 2022-04-12 RX ORDER — ALOGLIPTIN 6.25 MG/1
6.25 TABLET, FILM COATED ORAL DAILY
Status: DISCONTINUED | OUTPATIENT
Start: 2022-04-12 | End: 2022-04-14 | Stop reason: HOSPADM

## 2022-04-12 RX ORDER — OXYCODONE HYDROCHLORIDE AND ACETAMINOPHEN 5; 325 MG/1; MG/1
1 TABLET ORAL EVERY 8 HOURS PRN
Status: DISCONTINUED | OUTPATIENT
Start: 2022-04-12 | End: 2022-04-14 | Stop reason: HOSPADM

## 2022-04-12 RX ADMIN — IOPAMIDOL 75 ML: 755 INJECTION, SOLUTION INTRAVENOUS at 01:45

## 2022-04-12 RX ADMIN — METOPROLOL SUCCINATE 25 MG: 25 TABLET, EXTENDED RELEASE ORAL at 20:16

## 2022-04-12 RX ADMIN — OXYCODONE AND ACETAMINOPHEN 1 TABLET: 5; 325 TABLET ORAL at 20:16

## 2022-04-12 RX ADMIN — ALOGLIPTIN 6.25 MG: 6.25 TABLET, FILM COATED ORAL at 20:16

## 2022-04-12 RX ADMIN — HEPARIN SODIUM 5000 UNITS: 10000 INJECTION INTRAVENOUS; SUBCUTANEOUS at 14:07

## 2022-04-12 RX ADMIN — HEPARIN SODIUM 5000 UNITS: 10000 INJECTION INTRAVENOUS; SUBCUTANEOUS at 21:00

## 2022-04-12 RX ADMIN — Medication 2000 UNITS: at 20:16

## 2022-04-12 RX ADMIN — GABAPENTIN 300 MG: 300 CAPSULE ORAL at 20:16

## 2022-04-12 RX ADMIN — VANCOMYCIN HYDROCHLORIDE 1750 MG: 5 INJECTION, POWDER, LYOPHILIZED, FOR SOLUTION INTRAVENOUS at 02:46

## 2022-04-12 RX ADMIN — ESCITALOPRAM OXALATE 10 MG: 10 TABLET ORAL at 20:16

## 2022-04-12 RX ADMIN — CEFEPIME HYDROCHLORIDE 2000 MG: 2 INJECTION, POWDER, FOR SOLUTION INTRAVENOUS at 02:04

## 2022-04-12 RX ADMIN — DOCUSATE SODIUM 100 MG: 100 CAPSULE, LIQUID FILLED ORAL at 20:16

## 2022-04-12 RX ADMIN — Medication 2000 MG: at 20:17

## 2022-04-12 RX ADMIN — POLYETHYLENE GLYCOL 3350 17 G: 17 POWDER, FOR SOLUTION ORAL at 20:15

## 2022-04-12 RX ADMIN — BUSPIRONE HYDROCHLORIDE 5 MG: 5 TABLET ORAL at 20:16

## 2022-04-12 RX ADMIN — SODIUM CHLORIDE, PRESERVATIVE FREE 10 ML: 5 INJECTION INTRAVENOUS at 20:25

## 2022-04-12 RX ADMIN — INSULIN GLARGINE 10 UNITS: 100 INJECTION, SOLUTION SUBCUTANEOUS at 20:24

## 2022-04-12 RX ADMIN — SENNOSIDES 17.2 MG: 8.6 TABLET, FILM COATED ORAL at 20:16

## 2022-04-12 ASSESSMENT — PAIN SCALES - GENERAL
PAINLEVEL_OUTOF10: 8
PAINLEVEL_OUTOF10: 8
PAINLEVEL_OUTOF10: 0
PAINLEVEL_OUTOF10: 0

## 2022-04-12 ASSESSMENT — PAIN DESCRIPTION - LOCATION: LOCATION: BUTTOCKS

## 2022-04-12 NOTE — PROGRESS NOTES
Coccyx wound noted on admission. Low-airloss specialty bed ordered and wound RN Mami Boo notified of consult via secure text message.

## 2022-04-12 NOTE — PROGRESS NOTES
Dr Gonzalez's office called back regarding consult.  Dr Susie Oliveros added to patient's care team.

## 2022-04-12 NOTE — PROGRESS NOTES
P Quality Flow/Interdisciplinary Rounds Progress Note        Quality Flow Rounds held on April 12, 2022    Disciplines Attending:  Bedside Nurse, ,  and Nursing Unit Leadership    Ezio Topete was admitted on 4/11/2022 10:09 PM    Anticipated Discharge Date:       Disposition:    Herbert Score:       Readmission Risk              Risk of Unplanned Readmission:  21           Discussed patient goal for the day, patient clinical progression, and barriers to discharge. The following Goal(s) of the Day/Commitment(s) have been identified:  check attending's plan.       Eunice Lopez RN  April 12, 2022

## 2022-04-12 NOTE — CONSULTS
[address] [place] [address] [facility]  Consultation Note     Admit Date: 4/11/2022 10:09 PM    Reason for Consult:   Sepsis    Attending Physician:  Juan M Stark MD    HISTORY OF PRESENT ILLNESS:             The history is obtained from extensive review of available past medical records. The patient is a 66 y.o. female who is previously known to the ID service. The patient resides at [address] [place] [address]. She was sent to the ED at [facility] on 4/11/2022 because she was complaining of sacral pain from a chronic decubitus ulcer. On arrival she had a temperature of 101.5 °F and was tachycardic. She was given Cefepime and Vancomycin. Ceftriaxone was ordered. Past Medical History:        Diagnosis Date    Anemia in chronic kidney disease     Anxiety and depression     Arthritis     Chronic pain syndrome     COVID-19 05/2020    COVID-19     Diabetes mellitus ([facility]     Dysphagia, oral phase     GERD (gastroesophageal reflux disease)     Hemodialysis patient ([facility]     M-W-F    Hyperkalemia     Hypertension     Hypertensive kidney disease with stage 4 chronic kidney disease (HCC)     Insomnia     Kidney stones     MDD (major depressive disorder)     Moderate protein-calorie malnutrition (HCC)     Morbid obesity (HCC)     Muscle weakness (generalized)     Obesity     Pressure ulcer of sacral region     Sacral pressure ulcer 08/03/2021    stage 4    Unspecified osteoarthritis, unspecified site     Weakness generalized      May 2021. Admitted to [facility] from [facility] with jerking motions and an elevated white count. Treated with Ceftriaxone. Seen by ID for UTI. There was thought that the white count was elevated because of dystonia. She had asymptomatic bacteriuria. This was not further treated. ID signed off    5/21/2020. Readmitted to [facility] because of recurrent melena. She had an EGD that showed a prepyloric ulcer.   Seen by Dr. Mignon Goldman because she was being treated for a sacral decubitus ulcer. Zosyn, Fluconazole and Vancomycin were resumed. Wound culture grew Klebsiella oxytoca and Enterococcus faecium (VRE) antibiotics were changed over to Ertapenem and Daptomycin.     5/3/2020. Admitted to [facility] with worsening pain, drainage and odor from eyes sacral decubitus ulcer. She had been prescribed Bactrim and cephalosporins as an outpatient. Seen by Dr. Luz Lewis. She tested positive for SARS-CoV-2 and was treated with hydroxychloroquine. Also treated with Clindamycin, Zosyn and Vancomycin. Cultures grew MRSA, Proteus, Enterococcus avium and anaerobic gram-negative rods. Discharged on Vancomycin, Fluconazole and Zosyn x6 weeks. She was discharged to [facility] in [facility].     Patient says that we treated her for a foot infection many years ago. Past Surgical History:        Procedure Laterality Date    ABDOMEN SURGERY N/A 5/4/2020    SACRAL WOUND DEBRIDEMENT CALL DRWatson  WITH TIME AVAIL AM performed by Toney Franco MD at [address] [place] [address]  3/31/16    Laparoscopic-Dr. Jay Mcdonnell    CYSTOSCOPY  2011     for kidney stones    DIALYSIS FISTULA CREATION Left 8/5/2021    INSERTION FISTULA LEFT ARM performed by Mirela Gordon MD at [facility] Right 11/18/2021    REVISION AV FISTULA LEFT ARM performed by Mirela Gordon MD at [place] [address]     right     UPPER GASTROINTESTINAL ENDOSCOPY  2.18.15    Dr. Shauna Peter Findings: Mild Gastrits and Duodenitis, 2cm Hiatal Hernia    UPPER GASTROINTESTINAL ENDOSCOPY N/A 5/8/2020    EGD CONTROL HEMORRHAGE performed by Toney Franco MD at [facility] 5/22/2020    EGD BEDSIDE performed by Sharron Almonte MD at [facility]. [address] N/A 5/6/2021    INSERTION TUNNELED DIALYSIS CATHETER performed by Mirela Gordon MD at [facility] Medications:   Scheduled Meds:   busPIRone  10 mg Oral BID    busPIRone  5 mg Oral Nightly    cefTRIAXone  1,000 mg IntraMUSCular Once    escitalopram  10 mg Oral Daily    docusate sodium  100 mg Oral BID    Ferric Citrate  2 tablet Oral TID WC    gabapentin  300 mg Oral TID    insulin glargine  10 Units SubCUTAneous Nightly    insulin lispro  10 Units SubCUTAneous TID     [START ON 4/13/2022] linaCLOtide  72 mcg Oral QAM AC    metoprolol succinate  25 mg Oral Daily    [START ON 4/13/2022] midodrine  15 mg Oral Once per day on Mon Wed Fri    polyethylene glycol  17 g Oral Daily    senna  2 tablet Oral BID    vitamin D  2,000 Units Oral Daily    sodium chloride flush  5-40 mL IntraVENous 2 times per day    heparin (porcine)  5,000 Units SubCUTAneous 3 times per day    alogliptin  6.25 mg Oral Daily    [START ON 4/13/2022] epoetin derek-epbx  10,000 Units SubCUTAneous Once per day on Mon Wed Fri     Continuous Infusions:   sodium chloride      dextrose       PRN Meds:bisacodyl, magnesium hydroxide, midodrine, traZODone, sodium chloride flush, sodium chloride, ondansetron **OR** ondansetron, polyethylene glycol, acetaminophen **OR** acetaminophen, glucose, glucagon (rDNA), dextrose, dextrose bolus (hypoglycemia) **OR** dextrose bolus (hypoglycemia), albuterol, oxyCODONE-acetaminophen **AND** oxyCODONE    Allergies:  Patient has no known allergies. Social History:   Social History     Socioeconomic History    Marital status:      Spouse name: Not on file    Number of children: Not on file    Years of education: Not on file    Highest education level: Not on file   Occupational History    Not on file   Tobacco Use    Smoking status: Former Smoker     Packs/day: 1.00     Years: 30.00     Pack years: 30.00     Quit date: 8/13/2011     Years since quitting: 10.6    Smokeless tobacco: Never Used   Vaping Use    Vaping Use: Never used   Substance and Sexual Activity    Alcohol use:  No  Drug use: No    Sexual activity: Not Currently   Other Topics Concern    Not on file   Social History Narrative    Not on file     Social Determinants of Health     Financial Resource Strain:     Difficulty of Paying Living Expenses: Not on file   Food Insecurity:     Worried About Running Out of Food in the Last Year: Not on file    Demetrice of Food in the Last Year: Not on file   Transportation Needs:     Lack of Transportation (Medical): Not on file    Lack of Transportation (Non-Medical): Not on file   Physical Activity:     Days of Exercise per Week: Not on file    Minutes of Exercise per Session: Not on file   Stress:     Feeling of Stress : Not on file   Social Connections:     Frequency of Communication with Friends and Family: Not on file    Frequency of Social Gatherings with Friends and Family: Not on file    Attends Synagogue Services: Not on file    Active Member of [address] [place] [address] [employer] or Organizations: Not on file    Attends Club or Organization Meetings: Not on file    Marital Status: Not on file   Intimate Partner Violence:     Fear of Current or Ex-Partner: Not on file    Emotionally Abused: Not on file    Physically Abused: Not on file    Sexually Abused: Not on file   Housing Stability:     Unable to Pay for Housing in the Last Year: Not on file    Number of [place] in the Last Year: Not on file    Unstable Housing in the Last Year: Not on file      [address] [facility] resident    Family History:       Problem Relation Age of Onset    Cancer Sister    . Otherwise non-pertinent to the chief complaint. REVIEW OF SYSTEMS:    Constitutional: Positive for fevers, chills, diaphoresis  Neurologic: Negative   Psychiatric: Negative  Rheumatologic: Negative   Endocrine: Negative  Hematologic: Negative  Immunologic: Negative  ENT: Negative  Respiratory: Negative   Cardiovascular: Negative  GI: Negative  : Negative  Musculoskeletal: Negative  Skin: No rashes.      PHYSICAL EXAM: Vitals:   BP (!) 107/47   Pulse 69   Temp 97.6 °F (36.4 °C) (Oral)   Resp 12   Ht 5' 1\" (1.549 m)   Wt 190 lb (86.2 kg)   SpO2 95%   BMI 35.90 kg/m²   Constitutional: The patient is awake, alert, and oriented. Lying in bed. No distress. Skin: Warm and dry. No rashes were noted. The right hip and buttock are cellulitic. See below. HEENT: Eyes show round, and reactive pupils. No jaundice. Moist mucous membranes, no ulcerations, no thrush. Neck: Supple to movements. No lymphadenopathy. Chest: No use of accessory muscles to breathe. Symmetrical expansion. Auscultation reveals no wheezing, crackles, or rhonchi. Tunneled HD catheter. Cardiovascular: S1 and S2 are rhythmic and regular. No murmurs appreciated. Abdomen: Positive bowel sounds to auscultation. Benign to palpation. No masses felt. No hepatosplenomegaly. Back: There is a decubitus ulcer over the coccyx. It is clean and nonpurulent. There is a slight opening over previous surgical wound over the left upper buttock. There is a slight opening in the middle. No crepitus. Extremities: No clubbing, no cyanosis, no edema.   Lines: Peripheral.      CBC+dif:  Recent Labs     04/11/22  2241   WBC 7.8   HGB 9.2*   HCT 29.3*   .2*      NEUTROABS 6.38     Lab Results   Component Value Date    CRP 19.3 (H) 05/24/2020    CRP 15.8 (H) 05/21/2020    CRP 0.7 (H) 08/13/2019      No results found for: [facility]  Lab Results   Component Value Date    SEDRATE 45 (H) 08/13/2019    SEDRATE 35 (H) 08/16/2013    SEDRATE 78 (H) 08/17/2011     Lab Results   Component Value Date    ALT 11 04/11/2022    AST 26 04/11/2022    ALKPHOS 78 04/11/2022    BILITOT 0.3 04/11/2022     Lab Results   Component Value Date     04/11/2022    K 4.2 04/12/2022    K 4.9 02/07/2022    CL 97 04/11/2022    CO2 27 04/11/2022    BUN 34 04/11/2022    CREATININE 3.9 04/11/2022    GFRAA 14 04/11/2022    LABGLOM 11 04/11/2022    GLUCOSE 212 04/11/2022    GLUCOSE 149 10/12/2011    PROT 6.7 04/11/2022    LABALBU 2.6 04/11/2022    LABALBU 4.1 10/12/2011    CALCIUM 8.9 04/11/2022    BILITOT 0.3 04/11/2022    ALKPHOS 78 04/11/2022    AST 26 04/11/2022    ALT 11 04/11/2022       Lab Results   Component Value Date    PROTIME 10.8 12/05/2021    PROTIME 15.3 06/25/2011    INR 1.0 12/05/2021       Lab Results   Component Value Date    TSH 1.945 08/16/2013       Lab Results   Component Value Date    COLORU Yellow 04/12/2022    PHUR 7.0 04/12/2022    WBCUA >20 04/12/2022    WBCUA 5-10 08/17/2011    RBCUA >20 04/12/2022    RBCUA 0-1 06/26/2013    YEAST Present 08/11/2020    BACTERIA MODERATE 04/12/2022    CLARITYU TURBID 04/12/2022    SPECGRAV 1.020 04/12/2022    LEUKOCYTESUR LARGE 04/12/2022    UROBILINOGEN 0.2 04/12/2022    BILIRUBINUR Negative 04/12/2022    BILIRUBINUR NEGATIVE 08/17/2011    BLOODU MODERATE 04/12/2022    GLUCOSEU Negative 04/12/2022    GLUCOSEU NEGATIVE 08/17/2011    AMORPHOUS FEW 06/25/2011       Lab Results   Component Value Date    HCO3 20.3 05/10/2020    BE -3.2 05/10/2020    O2SAT 97.5 05/10/2020    PH 7.433 05/10/2020    PH 7.377 06/27/2011    PCO2 31.0 05/10/2020    PO2 106.3 05/10/2020     Radiology:  Noted    Microbiology:  Pending  No results for input(s): BC in the last 72 hours. No results for input(s): ORG in the last 72 hours. No results for input(s): Sandra Holes in the last 72 hours. No results for input(s): STREPNEUMAGU in the last 72 hours. No results for input(s): LP1UAG in the last 72 hours. No results for input(s): ASO in the last 72 hours. No results for input(s): CULTRESP in the last 72 hours. No results for input(s): PROCAL in the last 72 hours. Assessment:  · Cellulitis right buttock and hip.   The point of entry is most likely the sacral or buttock wound  · ESRD, on HD  · Fever secondary to cellulitis    Plan:    · Start Cefazolin  · Check cultures, baseline ESR, CRP, ASO titer  · Monitor labs  · Will follow with you    Thank you for having us see this patient in consultation.   Discussed with renal.    Maite Fischer MD  2:24 PM  4/12/2022

## 2022-04-12 NOTE — DISCHARGE INSTR - COC
Continuity of Care Form    Patient Name: Darcy Black   :  1950  MRN:  [id number]    Admit date:  2022  Discharge date:  22    Code Status Order: Prior   Advance Directives:  DNR-CC form in chart    Admitting Physician:  Cian Pruett MD  PCP: Cain Pruett MD    Discharging Nurse: Jered [facility]chavarria [facility]358/9914-T  Discharging Unit Phone Number: [phone]    Emergency Contact:   Extended Emergency Contact Information  Primary Emergency Contact: MaynorJohnson   [facility] [address] Phone: [phone]  Mobile Phone: [phone]  Relation: Child   needed? No  Secondary Emergency Contact: [facility] [address] Phone: [phone]  Mobile Phone: [phone]  Relation: Child   needed? No    Past Surgical History:  Past Surgical History:   Procedure Laterality Date    ABDOMEN SURGERY N/A 2020    SACRAL WOUND DEBRIDEMENT CALL   WITH TIME AVAIL AM performed by Therese Hernandez MD at [address]   x3    CHOLECYSTECTOMY  3/31/16    Laparoscopic-Dr. Justin Pan       for kidney stones    DIALYSIS FISTULA CREATION Left 2021    INSERTION FISTULA LEFT ARM performed by Marino Cook MD at [address] Right 2021    REVISION AV FISTULA LEFT ARM performed by Marino Cook MD at [place] [address]     right     UPPER GASTROINTESTINAL ENDOSCOPY  2.18.15    Dr. Jann Ibarra Findings: Mild Gastrits and Duodenitis, 2cm Hiatal Hernia    UPPER GASTROINTESTINAL ENDOSCOPY N/A 2020    EGD CONTROL HEMORRHAGE performed by Therese Hernandez MD at [address] N/A 2020    EGD BEDSIDE performed by Francesca Umaña MD at [address] N/A 2021    INSERTION TUNNELED DIALYSIS CATHETER performed by Marino Cook MD at [address]       Immunization History: Immunization History   Administered Date(s) Administered    COVID-19, Pfizer Purple top, DILUTE for use, 12+ yrs, 30mcg/0.3mL dose 2021, 2021       Active Problems:  Patient Active Problem List   Diagnosis Code    Neurologic gait dysfunction R26.9    Renal failure, acute on chronic ([address] N17.9, N18.9    Diabetes mellitus ([address] E11.9    Hypertension I10    Arthritis M19.90    Kidney disease N28.9    Knee problem M25.9    Anemia due to stage 3 chronic kidney disease N18.30, D63.1    Primary osteoarthritis of both knees M17.0    Acute on chronic renal insufficiency N28.9, N18.9    Acute kidney injury superimposed on CKD ([address] N17.9, N18.9    Acute renal failure superimposed on chronic kidney disease, on chronic dialysis ([address] N17.9, N18.9, Z99.2     novel coronavirus disease (COVID-19) U07.1    Moderate protein-calorie malnutrition ([address] E44.0    PERLA (acute kidney injury) ([address] N17.9    Stage 5 chronic kidney disease on chronic dialysis ([address] N18.6, Z99.2    Pressure injury of sacral region, stage 4 ([address] L89.154    Pressure injury of right hip, stage 3 (formerly [facility]) R75.012    Syncope and collapse R55    Unresponsive episode R41.89    Left bundle branch block I44.7    Pure hypercholesterolemia E78.00    Moderate obesity E66.8    Sepsis ([address] A41.9       Isolation/Infection:   Isolation            No Isolation          Patient Infection Status       Infection Onset Added Last Indicated Last Indicated By Review Planned Expiration Resolved Resolved By    None active    Resolved    COVID-19 (Rule Out) 22 COVID-19, Rapid (Ordered)   22 Rule-Out Test Resulted    COVID-19 (Rule Out) 21 COVID-19 (Ordered)   21 Rule-Out Test Resulted    COVID-19 (Rule Out) 10/09/20 10/09/20 10/09/20 COVID-19 (Ordered)   10/09/20 Rule-Out Test Resulted    COVID-19 (Rule Out) 20 COVID-19 (Ordered)   20     VRE 20 Culture, Wound   10/12/20 Belva Hodgkin, RN    Enterococcus faecium Wound-Coccyx 20    MDRO (multi-drug resistant organism) 20 Culture, Wound   10/12/20 Belva Hodgkin, RN    COVID-19 (Rule Out) 20 COVID-19 (Ordered)   20 Rule-Out Test Resulted    DETECTED 2020    COVID-19 (Rule Out) 05/14/20 05/15/20 05/14/20 Covid-19 Ambulatory (Ordered)   20 Rule-Out Test Resulted    Detected 2020    COVID-19 (Rule Out) 20 COVID-19 (Ordered)   20 Rule-Out Test Resulted    DETECTED 2020    C-diff Rule Out 20 CLOSTRIDIUM DIFFICILE EIA (Ordered)   20 Latasha Hamm RN    Does not meet criteria    MRSA 20 Culture, Wound   20 Gay Meadows RN    Wound buttock 5/3/20    COVID-19 20 COVID-19   20     Detected 2020, 2020, 2020, 5/3/2020    COVID-19 (Rule Out) 20 COVID-19 (Ordered)   20 Rule-Out Test Resulted    DETECTED 5/3/2020            Nurse Assessment:  Last Vital Signs: BP (!) 107/47   Pulse 69   Temp 97.6 °F (36.4 °C) (Oral)   Resp 12   Ht 5' 1\" (1.549 m)   Wt 190 lb (86.2 kg)   SpO2 95%   BMI 35.90 kg/m²     Last documented pain score (0-10 scale): Pain Level: 8  Last Weight:   Wt Readings from Last 1 Encounters:   22 190 lb (86.2 kg)     Mental Status:  oriented, alert, coherent, logical, thought processes intact, and able to concentrate and follow conversation    IV Access:  - LUE AV fistula    Nursing Mobility/ADLs:  Walking   Dependent  Transfer  Dependent  Bathing  Wendy [address]  Med Delivery   whole    Wound Care Documentation and Therapy:  Wound 10/10/20 Coccyx (Active)   Number of days: 549       Wound 21 Coccyx (Active)   Number of days: 459       Wound 21 Coccyx #1 (Active)   Number of days: 243       Wound 08/11/21 Buttocks Right #2 (Active)   Number of days: 243       Wound 12/06/21 Sacrum in fold (Active)   Number of days: 126        Elimination:  Continence: Bowel: Yes  Bladder: Yes  Urinary Catheter: None   Colostomy/Ileostomy/Ileal Conduit: No       Date of Last BM: 4/11/22  No intake or output data in the 24 hours ending 04/12/22 1040  No intake/output data recorded. Safety Concerns: At Risk for Falls    Impairments/Disabilities:      Vision and poor mobility    Nutrition Therapy:  Current Nutrition Therapy:   - Oral Diet:  Carb Control 4 carbs/meal (1800kcals/day)    Routes of Feeding: Oral  Liquids: No Restrictions  Daily Fluid Restriction: no  Last Modified Barium Swallow with Video (Video Swallowing Test): not done    Treatments at the Time of Hospital Discharge:   Respiratory Treatments: none  Oxygen Therapy:  is not on home oxygen therapy.   Ventilator:    - No ventilator support    Rehab Therapies: Physical Therapy and Occupational Therapy  Weight Bearing Status/Restrictions: No weight bearing restrictions  Other Medical Equipment (for information only, NOT a DME order):  hospital bed and roger lift  Other Treatments: none    Patient's personal belongings (please select all that are sent with patient):  Jewelry, bilateral bracelets    RN SIGNATURE:  Electronically signed by Marga Burton RN on 4/14/22 at 2:37 PM EDT    CASE MANAGEMENT/SOCIAL WORK SECTION    Inpatient Status Date: 4/12    Readmission Risk Assessment Score:  Readmission Risk              Risk of Unplanned Readmission:  21           Discharging to Facility/ [address] [place] [address]  Phone: [phone]  Fax: [phone]          Dialysis Facility (if applicable)   Name:  Address:  Dialysis Schedule:  Phone:  Fax:    / signature: Electronically signed by Mary Eason RN on 4/12/22 at 10:40 AM EDT    PHYSICIAN SECTION    Prognosis: Good    Condition at Discharge: Stable    Rehab Potential (if transferring to Rehab): Fair    Recommended Labs or Other Treatments After Discharge: cbc cmp    Physician Certification: I certify the above information and transfer of Shira Avila  is necessary for the continuing treatment of the diagnosis listed and that she requires [facility] for greater 30 days.      Update Admission H&P: No change in H&P    PHYSICIAN SIGNATURE:  Electronically signed by Jean-Pierre Walsh MD on 4/14/22 at 7:23 AM EDT

## 2022-04-12 NOTE — CARE COORDINATION
Spoke to Tg Caballero at [facility]- she confirmed that patient is a long term bed hold- can return. No precert needed. No covid needed upon discharge. Spoke to Pino Ko at [facility] [phone]- she confirmed pt;'s chair time  am. [employer] will need updated upon discharge.

## 2022-04-12 NOTE — PROGRESS NOTES
Nicole Meeks was ordered Ramiro 1 gm and Linzess 72 mcg  which are nonformulary medications. This medication will need to be supplied by the patient as the pharmacy does not carry this non-formulary medication. If the medication has not been administered by 1400 on the following day from the time the order was placed, a pharmacist will follow-up with the nurse of the patient to assess the capability of the patient to bring in the medication. If it is determined that the patient cannot supply the medication and it is not available to be dispensed from the pharmacy, the provider will be notified.

## 2022-04-12 NOTE — PROGRESS NOTES
Department of Internal Medicine  Nephrology Attending Progress Note        SUBJECTIVE:  Mrs Tamie Hernandez is aq 66 yro woman on chronic dialysis who has been complaining of worsening sacral pain and was sent in by her nursing facility for evaluation. Pt given iv contrast, will need treatment today. No complaint at dialysis or significant back pain. Left dialysis 89.8 kg, CT of abdomen did not show any worsening of her sacral decubitus she continues to have some pockets of subcutaneous emphysema, and there were concerns for sacrococcygeal osteomyelitis. There were changes noted on the left kidney suggesting possibility of a complex cyst    PMH  ESRD on dialysis  dry weight of 89.5 kg  Type 2 diabetes mellitus with neuropathy, nephropathy  Hypertension  Anemia of chronic kidney disease  History of osteoarthritis  Secondary hyperparathyroid disease of renal origin  History of COVID-19 infection  History of sacral ulcer  Hyperlipidemia  Elevated BMI  History of sarcoidosis  History of gastroesophageal reflux disease  History of renal lithiasis  History of cholecystectomy  History of  x3  Upper endoscopy showing gastritis duodenitis  History of remote tobacco use quit 10 years ago  Recent admission with bradycardia quiring adjustment of dose of metoprolol    Physical Exam:    Vitals:    22 0730   BP: (!) 107/47   Pulse: 69   Resp: 12   Temp: 97.6 °F (36.4 °C)   SpO2: 95%       I/O last 24 hours:  Intake/Output just admitted    Weight: 190    General Appearance:  awake, alert, oriented, in no acute distress  Skin: Large sacral decubitus bandage  Neck:  neck- supple, no mass, non-tender and no bruits  Lungs: Distant breath sounds no wheezing or rhonchi  Heart: Regular rhythm no murmur rub     Abdominal: Abdomen soft, non-tender.  BS normal. No masses,  No organomegaly  Extremities: trace pedal edema  Peripheral Pulses:  +2    DATA:    CBC with Differential:    Lab Results   Component Value Date    WBC 7.8 04/11/2022    RBC 2.76 04/11/2022    HGB 9.2 04/11/2022    HCT 29.3 04/11/2022     04/11/2022    .2 04/11/2022    MCH 33.3 04/11/2022    MCHC 31.4 04/11/2022    RDW 15.8 04/11/2022    NRBC 0.9 05/25/2020    SEGSPCT 71 08/16/2013    BANDSPCT 1 03/29/2016    METASPCT 0.9 05/10/2020    LYMPHOPCT 6.0 04/11/2022    PROMYELOPCT 0.9 05/09/2020    MONOPCT 11.5 04/11/2022    MYELOPCT 1.7 05/11/2020    BASOPCT 0.1 04/11/2022    MONOSABS 0.90 04/11/2022    LYMPHSABS 0.47 04/11/2022    EOSABS 0.01 04/11/2022    BASOSABS 0.01 04/11/2022     CMP:    Lab Results   Component Value Date     04/11/2022    K 4.2 04/12/2022    K 4.9 02/07/2022    CL 97 04/11/2022    CO2 27 04/11/2022    BUN 34 04/11/2022    CREATININE 3.9 04/11/2022    GFRAA 14 04/11/2022    LABGLOM 11 04/11/2022    GLUCOSE 212 04/11/2022    GLUCOSE 149 10/12/2011    PROT 6.7 04/11/2022    LABALBU 2.6 04/11/2022    LABALBU 4.1 10/12/2011    CALCIUM 8.9 04/11/2022    BILITOT 0.3 04/11/2022    ALKPHOS 78 04/11/2022    AST 26 04/11/2022    ALT 11 04/11/2022     Magnesium:    Lab Results   Component Value Date    MG 2.2 04/11/2022     Phosphorus:    Lab Results   Component Value Date    PHOS 3.1 04/11/2022            busPIRone  10 mg Oral BID    busPIRone  5 mg Oral Nightly    cefTRIAXone  1,000 mg IntraMUSCular Once    escitalopram  10 mg Oral Daily    docusate sodium  100 mg Oral BID    Ferric Citrate  2 tablet Oral TID WC    gabapentin  300 mg Oral TID    insulin glargine  10 Units SubCUTAneous Nightly    insulin lispro  10 Units SubCUTAneous TID WC    [START ON 4/13/2022] linaCLOtide  72 mcg Oral QAM AC    metoprolol succinate  25 mg Oral Daily    [START ON 4/13/2022] midodrine  15 mg Oral Once per day on Mon Wed Fri    polyethylene glycol  17 g Oral Daily    senna  2 tablet Oral BID    vitamin D  2,000 Units Oral Daily    sodium chloride flush  5-40 mL IntraVENous 2 times per day    heparin (porcine)  5,000 Units SubCUTAneous 3 times per day    alogliptin  6.25 mg Oral Daily      sodium chloride      dextrose       bisacodyl, magnesium hydroxide, midodrine, traZODone, sodium chloride flush, sodium chloride, ondansetron **OR** ondansetron, polyethylene glycol, acetaminophen **OR** acetaminophen, glucose, glucagon (rDNA), dextrose, dextrose bolus (hypoglycemia) **OR** dextrose bolus (hypoglycemia), albuterol, oxyCODONE-acetaminophen **AND** oxyCODONE    IMPRESSION/RECOMMENDATIONS:      End-stage renal disease we will plan for 3-hour treatment today in view of recent IV contrast usage  History of sacral decubitus as per ID  Anemia of chronic disease continue ARUN therapy  Secondary hyperparathyroid disease of renal origin phosphate controlled on ferrous citrate therapy        Db Norton MD  4/12/2022 1:52 PM

## 2022-04-12 NOTE — PROGRESS NOTES
Second call placed to Dr Nicole Coe at his office. Per office staff personnel, Dr Catherine Delcid will place admission orders remotely ASAP. Charge  [facility] aware.

## 2022-04-12 NOTE — FLOWSHEET NOTE
04/12/22 1757   Vital Signs   BP (!) 151/29   Temp 97.6 °F (36.4 °C)   Pulse 82   Resp 18   Weight 199 lb 4.7 oz (90.4 kg)   Weight Method Estimated   Percent Weight Change -0.77   Pain Assessment   Pain Assessment 0-10   Pain Level 0   Post-Hemodialysis Assessment   Post-Treatment Procedures Blood returned; Access bleeding time < 10 minutes   Machine Disinfection Process Acid/Vinegar Clean;Exterior Machine Disinfection;Verified Absence of Bleach in HCO3 Jug;Bleach; Machine Absence of Bleach Machine   Rinseback Volume (ml) 300 ml   Total Liters Processed (l/min) 47.3 l/min   Dialyzer Clearance Clear   Duration of Treatment (minutes) 180 minutes   Hemodialysis Intake (ml) 300 ml   Hemodialysis Output (ml) 1000 ml   NET Removed (ml) 700 ml   Tolerated Treatment Good   Patient Response to Treatment tolerated well   Bilateral Breath Sounds Diminished   Edema Generalized

## 2022-04-13 LAB
ACINETOBACTER CALCOAC BAUMANNII COMPLEX BY PCR: NOT DETECTED
ALBUMIN SERPL-MCNC: 3 G/DL (ref 3.5–5.2)
ALP BLD-CCNC: 115 U/L (ref 35–104)
ALT SERPL-CCNC: 34 U/L (ref 0–32)
ANION GAP SERPL CALCULATED.3IONS-SCNC: 9 MMOL/L (ref 7–16)
AST SERPL-CCNC: 101 U/L (ref 0–31)
BACTEROIDES FRAGILIS BY PCR: NOT DETECTED
BASOPHILS ABSOLUTE: 0.03 E9/L (ref 0–0.2)
BASOPHILS RELATIVE PERCENT: 0.5 % (ref 0–2)
BILIRUB SERPL-MCNC: <0.2 MG/DL (ref 0–1.2)
BOTTLE TYPE: ABNORMAL
BUN BLDV-MCNC: 23 MG/DL (ref 6–23)
CALCIUM SERPL-MCNC: 9.1 MG/DL (ref 8.6–10.2)
CANDIDA ALBICANS BY PCR: NOT DETECTED
CANDIDA AURIS BY PCR: NOT DETECTED
CANDIDA GLABRATA BY PCR: NOT DETECTED
CANDIDA KRUSEI BY PCR: NOT DETECTED
CANDIDA PARAPSILOSIS BY PCR: NOT DETECTED
CANDIDA TROPICALIS BY PCR: NOT DETECTED
CHLORIDE BLD-SCNC: 99 MMOL/L (ref 98–107)
CO2: 29 MMOL/L (ref 22–29)
CREAT SERPL-MCNC: 3.4 MG/DL (ref 0.5–1)
CRYPTOCOCCUS NEOFORMANS/GATTII BY PCR: NOT DETECTED
ENTEROBACTER CLOACAE COMPLEX BY PCR: NOT DETECTED
ENTEROBACTERALES BY PCR: NOT DETECTED
ENTEROCOCCUS FAECALIS BY PCR: NOT DETECTED
ENTEROCOCCUS FAECIUM BY PCR: NOT DETECTED
EOSINOPHILS ABSOLUTE: 0.29 E9/L (ref 0.05–0.5)
EOSINOPHILS RELATIVE PERCENT: 5.2 % (ref 0–6)
ESCHERICHIA COLI BY PCR: NOT DETECTED
GFR AFRICAN AMERICAN: 16
GFR NON-AFRICAN AMERICAN: 13 ML/MIN/1.73
GLUCOSE BLD-MCNC: 107 MG/DL (ref 74–99)
HAEMOPHILUS INFLUENZAE BY PCR: NOT DETECTED
HAV IGM SER IA-ACNC: NORMAL
HBV SURFACE AB TITR SER: NORMAL {TITER}
HCT VFR BLD CALC: 28.6 % (ref 34–48)
HEMOGLOBIN: 8.9 G/DL (ref 11.5–15.5)
HEPATITIS B CORE IGM ANTIBODY: NORMAL
HEPATITIS B SURFACE ANTIGEN INTERPRETATION: NORMAL
HEPATITIS C ANTIBODY INTERPRETATION: NORMAL
IMMATURE GRANULOCYTES #: 0.02 E9/L
IMMATURE GRANULOCYTES %: 0.4 % (ref 0–5)
KLEBSIELLA AEROGENES BY PCR: NOT DETECTED
KLEBSIELLA OXYTOCA BY PCR: NOT DETECTED
KLEBSIELLA PNEUMONIAE GROUP BY PCR: NOT DETECTED
LISTERIA MONOCYTOGENES BY PCR: NOT DETECTED
LYMPHOCYTES ABSOLUTE: 0.98 E9/L (ref 1.5–4)
LYMPHOCYTES RELATIVE PERCENT: 17.6 % (ref 20–42)
MCH RBC QN AUTO: 33.5 PG (ref 26–35)
MCHC RBC AUTO-ENTMCNC: 31.1 % (ref 32–34.5)
MCV RBC AUTO: 107.5 FL (ref 80–99.9)
METER GLUCOSE: 108 MG/DL (ref 74–99)
METER GLUCOSE: 127 MG/DL (ref 74–99)
METER GLUCOSE: 139 MG/DL (ref 74–99)
METER GLUCOSE: 143 MG/DL (ref 74–99)
METER GLUCOSE: 174 MG/DL (ref 74–99)
METHICILLIN RESISTANCE MECA/C  BY PCR: DETECTED
MONOCYTES ABSOLUTE: 0.59 E9/L (ref 0.1–0.95)
MONOCYTES RELATIVE PERCENT: 10.6 % (ref 2–12)
NEISSERIA MENINGITIDIS BY PCR: NOT DETECTED
NEUTROPHILS ABSOLUTE: 3.65 E9/L (ref 1.8–7.3)
NEUTROPHILS RELATIVE PERCENT: 65.7 % (ref 43–80)
ORDER NUMBER: ABNORMAL
PDW BLD-RTO: 15.5 FL (ref 11.5–15)
PLATELET # BLD: 186 E9/L (ref 130–450)
PMV BLD AUTO: 9.6 FL (ref 7–12)
POTASSIUM REFLEX MAGNESIUM: 4.5 MMOL/L (ref 3.5–5)
PROTEUS SPECIES BY PCR: NOT DETECTED
PSEUDOMONAS AERUGINOSA BY PCR: NOT DETECTED
RBC # BLD: 2.66 E12/L (ref 3.5–5.5)
SALMONELLA SPECIES BY PCR: NOT DETECTED
SERRATIA MARCESCENS BY PCR: NOT DETECTED
SODIUM BLD-SCNC: 137 MMOL/L (ref 132–146)
SOURCE OF BLOOD CULTURE: ABNORMAL
STAPHYLOCOCCUS AUREUS BY PCR: NOT DETECTED
STAPHYLOCOCCUS EPIDERMIDIS BY PCR: DETECTED
STAPHYLOCOCCUS LUGDUNENSIS BY PCR: NOT DETECTED
STAPHYLOCOCCUS SPECIES BY PCR: DETECTED
STENOTROPHOMONAS MALTOPHILIA BY PCR: NOT DETECTED
STREPTOCOCCUS AGALACTIAE BY PCR: NOT DETECTED
STREPTOCOCCUS PNEUMONIAE BY PCR: NOT DETECTED
STREPTOCOCCUS PYOGENES  BY PCR: NOT DETECTED
STREPTOCOCCUS SPECIES BY PCR: NOT DETECTED
TOTAL PROTEIN: 6 G/DL (ref 6.4–8.3)
WBC # BLD: 5.6 E9/L (ref 4.5–11.5)

## 2022-04-13 PROCEDURE — 2580000003 HC RX 258: Performed by: FAMILY MEDICINE

## 2022-04-13 PROCEDURE — 6360000002 HC RX W HCPCS: Performed by: SPECIALIST

## 2022-04-13 PROCEDURE — 82962 GLUCOSE BLOOD TEST: CPT

## 2022-04-13 PROCEDURE — 6370000000 HC RX 637 (ALT 250 FOR IP): Performed by: FAMILY MEDICINE

## 2022-04-13 PROCEDURE — 85025 COMPLETE CBC W/AUTO DIFF WBC: CPT

## 2022-04-13 PROCEDURE — 2060000000 HC ICU INTERMEDIATE R&B

## 2022-04-13 PROCEDURE — 80053 COMPREHEN METABOLIC PANEL: CPT

## 2022-04-13 PROCEDURE — 6360000002 HC RX W HCPCS: Performed by: FAMILY MEDICINE

## 2022-04-13 PROCEDURE — 36415 COLL VENOUS BLD VENIPUNCTURE: CPT

## 2022-04-13 PROCEDURE — 5A1D70Z PERFORMANCE OF URINARY FILTRATION, INTERMITTENT, LESS THAN 6 HOURS PER DAY: ICD-10-PCS | Performed by: INTERNAL MEDICINE

## 2022-04-13 PROCEDURE — 90935 HEMODIALYSIS ONE EVALUATION: CPT

## 2022-04-13 PROCEDURE — 6360000002 HC RX W HCPCS: Performed by: INTERNAL MEDICINE

## 2022-04-13 RX ADMIN — Medication 2000 UNITS: at 11:25

## 2022-04-13 RX ADMIN — GABAPENTIN 300 MG: 300 CAPSULE ORAL at 21:37

## 2022-04-13 RX ADMIN — DOCUSATE SODIUM 100 MG: 100 CAPSULE, LIQUID FILLED ORAL at 11:25

## 2022-04-13 RX ADMIN — BUSPIRONE HYDROCHLORIDE 10 MG: 5 TABLET ORAL at 11:25

## 2022-04-13 RX ADMIN — OXYCODONE AND ACETAMINOPHEN 1 TABLET: 5; 325 TABLET ORAL at 02:16

## 2022-04-13 RX ADMIN — POLYETHYLENE GLYCOL 3350 17 G: 17 POWDER, FOR SOLUTION ORAL at 21:38

## 2022-04-13 RX ADMIN — TRAZODONE HYDROCHLORIDE 25 MG: 50 TABLET ORAL at 21:38

## 2022-04-13 RX ADMIN — HEPARIN SODIUM 5000 UNITS: 10000 INJECTION INTRAVENOUS; SUBCUTANEOUS at 21:37

## 2022-04-13 RX ADMIN — Medication 2000 MG: at 11:26

## 2022-04-13 RX ADMIN — ALOGLIPTIN 6.25 MG: 6.25 TABLET, FILM COATED ORAL at 11:25

## 2022-04-13 RX ADMIN — SODIUM CHLORIDE, PRESERVATIVE FREE 10 ML: 5 INJECTION INTRAVENOUS at 11:26

## 2022-04-13 RX ADMIN — HEPARIN SODIUM 5000 UNITS: 10000 INJECTION INTRAVENOUS; SUBCUTANEOUS at 05:45

## 2022-04-13 RX ADMIN — MIDODRINE HYDROCHLORIDE 15 MG: 5 TABLET ORAL at 11:24

## 2022-04-13 RX ADMIN — BUSPIRONE HYDROCHLORIDE 10 MG: 5 TABLET ORAL at 17:13

## 2022-04-13 RX ADMIN — ESCITALOPRAM OXALATE 10 MG: 10 TABLET ORAL at 11:25

## 2022-04-13 RX ADMIN — BUSPIRONE HYDROCHLORIDE 5 MG: 5 TABLET ORAL at 21:38

## 2022-04-13 RX ADMIN — HEPARIN SODIUM 5000 UNITS: 10000 INJECTION INTRAVENOUS; SUBCUTANEOUS at 15:40

## 2022-04-13 RX ADMIN — GABAPENTIN 300 MG: 300 CAPSULE ORAL at 15:40

## 2022-04-13 RX ADMIN — METOPROLOL SUCCINATE 25 MG: 25 TABLET, EXTENDED RELEASE ORAL at 11:26

## 2022-04-13 RX ADMIN — SENNOSIDES 17.2 MG: 8.6 TABLET, FILM COATED ORAL at 11:26

## 2022-04-13 RX ADMIN — OXYCODONE AND ACETAMINOPHEN 1 TABLET: 5; 325 TABLET ORAL at 12:33

## 2022-04-13 RX ADMIN — DOCUSATE SODIUM 100 MG: 100 CAPSULE, LIQUID FILLED ORAL at 21:37

## 2022-04-13 RX ADMIN — INSULIN GLARGINE 10 UNITS: 100 INJECTION, SOLUTION SUBCUTANEOUS at 21:38

## 2022-04-13 RX ADMIN — EPOETIN ALFA-EPBX 10000 UNITS: 10000 INJECTION, SOLUTION INTRAVENOUS; SUBCUTANEOUS at 12:28

## 2022-04-13 RX ADMIN — SODIUM CHLORIDE, PRESERVATIVE FREE 10 ML: 5 INJECTION INTRAVENOUS at 21:38

## 2022-04-13 RX ADMIN — POLYETHYLENE GLYCOL 3350 17 G: 17 POWDER, FOR SOLUTION ORAL at 11:25

## 2022-04-13 RX ADMIN — SENNOSIDES 17.2 MG: 8.6 TABLET, FILM COATED ORAL at 21:37

## 2022-04-13 ASSESSMENT — PAIN SCALES - GENERAL
PAINLEVEL_OUTOF10: 8
PAINLEVEL_OUTOF10: 0
PAINLEVEL_OUTOF10: 8
PAINLEVEL_OUTOF10: 9
PAINLEVEL_OUTOF10: 7
PAINLEVEL_OUTOF10: 8
PAINLEVEL_OUTOF10: 7
PAINLEVEL_OUTOF10: 0

## 2022-04-13 ASSESSMENT — PAIN DESCRIPTION - LOCATION
LOCATION: BUTTOCKS

## 2022-04-13 ASSESSMENT — PAIN DESCRIPTION - PAIN TYPE
TYPE: ACUTE PAIN
TYPE: ACUTE PAIN

## 2022-04-13 ASSESSMENT — PAIN DESCRIPTION - ORIENTATION
ORIENTATION: RIGHT
ORIENTATION: RIGHT

## 2022-04-13 NOTE — CONSULTS
Comprehensive Nutrition Assessment    Type and Reason for Visit:  Initial,Consult,Wound    Nutrition Recommendations/Plan: Continue Current Diet, Start Jono BID and Gelatein BID    Nutrition Assessment:  Pt w/ cellulitis of R buttock/hip. Hx DM/ESRD w/ HD. Wound noted, will add ONS to aid in wound healing. Malnutrition Assessment:  Malnutrition Status:  Insufficient data    Context:  Chronic Illness     Findings of the 6 clinical characteristics of malnutrition:  Energy Intake:  Unable to assess  Weight Loss:  Unable to assess (d/t fluid shifts (ESRD w/ HD))     Body Fat Loss:  No significant body fat loss     Muscle Mass Loss:  No significant muscle mass loss    Fluid Accumulation:  No significant fluid accumulation     Strength:  Not Performed    Estimated Daily Nutrient Needs:  Energy (kcal):  ; Weight Used for Energy Requirements:  Current     Protein (g):  85-95 (1.8-2); Weight Used for Protein Requirements:  Ideal        Fluid (ml/day):  per renal; Method Used for Fluid Requirements:  Standard Renal      Nutrition Related Findings:  -I&Os, BLE +1 edema, abd soft, HD      Wounds:  Pressure Injury,Wound Consult Pending       Current Nutrition Therapies:    ADULT DIET;  Regular; 4 carb choices (60 gm/meal)    Anthropometric Measures:  · Height: 5' 1\" (154.9 cm)  · Current Body Weight: 200 lb (90.7 kg) (4/12 bed)   · Admission Body Weight: 200 lb (90.7 kg) (4/12 bed)    · Usual Body Weight: 195 lb (88.5 kg) (5/2021 actual per EMR, 205# X 2 months)     · Ideal Body Weight: 105 lbs; % Ideal Body Weight 190.5 %   · BMI: 37.8    · BMI Categories: Obese Class 2 (BMI 35.0 -39.9)       Nutrition Diagnosis:   · Increased nutrient needs related to increase demand for energy/nutrients as evidenced by wounds,dialysis    Nutrition Interventions:   Nutrition Education/Counseling:  Education not indicated   Coordination of Nutrition Care:  Continue to monitor while inpatient    Goals:  Pt to consume >75% of meals/ONS       Nutrition Monitoring and Evaluation:   Food/Nutrient Intake Outcomes:  Food and Nutrient Intake,Supplement Intake  Physical Signs/Symptoms Outcomes:  Biochemical Data,GI Status,Fluid Status or Edema,Nutrition Focused Physical Findings,Skin,Weight     Discharge Planning:     Too soon to determine     Electronically signed by Rupert Orozco RD, LD on 4/13/22 at 11:41 AM EDT  Contact: [phone]

## 2022-04-13 NOTE — FLOWSHEET NOTE
Pt ran 3 hours; 3251 bath; 1500 mL removed; stable/tolerated well; denies c/o. Needles removed & patent hemostasis achieved < 10 min, DSD applied. good Access flow no issues achieving prescribed BFR. Next Tx Friday 4-15-22.      04/13/22 1052   Vital Signs   BP (!) 109/41   Temp 97.7 °F (36.5 °C)   Pulse 67   Resp 18   Pain Assessment   Pain Assessment 0-10   Pain Level 0   Post-Hemodialysis Assessment   Post-Treatment Procedures Blood returned; Access bleeding time < 10 minutes   Machine Disinfection Process Acid/Vinegar Clean;Heat Disinfect; Exterior Machine Disinfection   Rinseback Volume (ml) 300 ml   Total Liters Processed (l/min) 57.8 l/min   Dialyzer Clearance Lightly streaked   Duration of Treatment (minutes) 180 minutes   Hemodialysis Output (ml) 1500 ml   NET Removed (ml) 1800 ml   Tolerated Treatment Good   Patient Response to Treatment no issues throughout tx or post tx   Bilateral Breath Sounds Clear;Diminished   Edema Generalized Non-pitting   RLE Edema +1;Non-pitting   LLE Edema +1;Non-pitting

## 2022-04-13 NOTE — PROGRESS NOTES
Physical Therapy    Order received and chart reviewed. Per chart review, pt is from nursing facility. Facility uses a roger for all transfers. Pt is at functional baseline. Will discontinue PT order at this time.

## 2022-04-13 NOTE — PROGRESS NOTES
Erythema rt hip,thigh  Consult coccyx wound    Stage 4 coccyx pressure injury POA  Wound 4x3x1. right sacral fold small open area. Red wound. Scant drainage  Left sacral area. Several small open areas. Large amount scar tissue. Irregular surface  Plan- has lo air loss bed,   Dressing opticel. erythem rt hip   ID following. Wound red, minimal granulation tissue. Per patient present 2 years  Recommend follow up with Norberto Klein to [facility].  Devan Fierro, CNS, Wound Care

## 2022-04-13 NOTE — PROGRESS NOTES
Occupational Therapy      Occupational Therapy referral received. Chart review completed: Patient is a resident of  [facility] , total assist with ADLs and a roger lift for transfers.   Patient at baseline level,   no skilled OT needs within acute hospital setting identified

## 2022-04-13 NOTE — PROGRESS NOTES
Department of Internal Medicine  Nephrology Attending Progress Note        SUBJECTIVE:  Mrs Ian Girard is aq 66 yro woman on chronic dialysis who has been complaining of worsening sacral pain and was sent in by her nursing facility for evaluation. Pt given iv contrast, will need treatment today. No complaint at dialysis or significant back pain. Left dialysis 89.8 kg, CT of abdomen did not show any worsening of her sacral decubitus she continues to have some pockets of subcutaneous emphysema, and there were concerns for sacrococcygeal osteomyelitis. There were changes noted on the left kidney suggesting possibility of a complex cyst    22 Mrs Amisha Champagne seen on dialysis continues to have complaints of pain over her sacral wound. Wound cultures positive for staph pyrogen.   Blood culture positive for gram positive cocci ID pending, PCR suggesting staph    PMH  ESRD on dialysis  dry weight of 89.5 kg  Type 2 diabetes mellitus with neuropathy, nephropathy  Hypertension  Anemia of chronic kidney disease  History of osteoarthritis  Secondary hyperparathyroid disease of renal origin  History of COVID-19 infection  History of sacral ulcer  Hyperlipidemia  Elevated BMI  History of sarcoidosis  History of gastroesophageal reflux disease  History of renal lithiasis  History of cholecystectomy  History of  x3  Upper endoscopy showing gastritis duodenitis  History of remote tobacco use quit 10 years ago  Recent admission with bradycardia quiring adjustment of dose of metoprolol    Physical Exam:    Vitals:    22 1100   BP: (!) 109/52   Pulse: 66   Resp: 18   Temp: 98 °F (36.7 °C)   SpO2: 100%       I/O last 24 hours:  Intake/Output  Weight: 190    General Appearance:  awake, alert, oriented, in no acute distress  Skin: Large sacral decubitus bandage  Neck:  neck- supple, no mass, non-tender and no bruits  Lungs: Distant breath sounds no wheezing or rhonchi  Heart: Regular rhythm no murmur rub     Abdominal: Abdomen soft, non-tender.  BS normal. No masses,  No organomegaly  Extremities: trace pedal edema  Peripheral Pulses:  +2    DATA:    CBC with Differential:    Lab Results   Component Value Date    WBC 5.6 04/13/2022    RBC 2.66 04/13/2022    HGB 8.9 04/13/2022    HCT 28.6 04/13/2022     04/13/2022    .5 04/13/2022    MCH 33.5 04/13/2022    MCHC 31.1 04/13/2022    RDW 15.5 04/13/2022    NRBC 0.9 05/25/2020    SEGSPCT 71 08/16/2013    BANDSPCT 1 03/29/2016    METASPCT 0.9 05/10/2020    LYMPHOPCT 17.6 04/13/2022    PROMYELOPCT 0.9 05/09/2020    MONOPCT 10.6 04/13/2022    MYELOPCT 1.7 05/11/2020    BASOPCT 0.5 04/13/2022    MONOSABS 0.59 04/13/2022    LYMPHSABS 0.98 04/13/2022    EOSABS 0.29 04/13/2022    BASOSABS 0.03 04/13/2022     CMP:    Lab Results   Component Value Date     04/13/2022    K 4.5 04/13/2022    CL 99 04/13/2022    CO2 29 04/13/2022    BUN 23 04/13/2022    CREATININE 3.4 04/13/2022    GFRAA 16 04/13/2022    LABGLOM 13 04/13/2022    GLUCOSE 107 04/13/2022    GLUCOSE 149 10/12/2011    PROT 6.0 04/13/2022    LABALBU 3.0 04/13/2022    LABALBU 4.1 10/12/2011    CALCIUM 9.1 04/13/2022    BILITOT <0.2 04/13/2022    ALKPHOS 115 04/13/2022     04/13/2022    ALT 34 04/13/2022     Magnesium:    Lab Results   Component Value Date    MG 2.2 04/11/2022     Phosphorus:    Lab Results   Component Value Date    PHOS 3.1 04/11/2022            busPIRone  10 mg Oral BID    busPIRone  5 mg Oral Nightly    escitalopram  10 mg Oral Daily    docusate sodium  100 mg Oral BID    Ferric Citrate  2 tablet Oral TID WC    gabapentin  300 mg Oral TID    insulin glargine  10 Units SubCUTAneous Nightly    insulin lispro  10 Units SubCUTAneous TID WC    linaCLOtide  72 mcg Oral QAM AC    metoprolol succinate  25 mg Oral Daily    midodrine  15 mg Oral Once per day on Mon Wed Fri    polyethylene glycol  17 g Oral Daily    senna  2 tablet Oral BID    vitamin D  2,000 Units Oral Daily    sodium chloride flush  5-40 mL IntraVENous 2 times per day    heparin (porcine)  5,000 Units SubCUTAneous 3 times per day    alogliptin  6.25 mg Oral Daily    epoetin derek-epbx  10,000 Units SubCUTAneous Once per day on Mon Wed Fri    ceFAZolin  2,000 mg IntraVENous Daily      sodium chloride      dextrose       glucose **OR** glucose, bisacodyl, magnesium hydroxide, midodrine, traZODone, sodium chloride flush, sodium chloride, ondansetron **OR** ondansetron, polyethylene glycol, acetaminophen **OR** acetaminophen, glucagon (rDNA), dextrose, dextrose bolus (hypoglycemia) **OR** dextrose bolus (hypoglycemia), albuterol, oxyCODONE-acetaminophen **AND** oxyCODONE    IMPRESSION/RECOMMENDATIONS:      End-stage renal disease we will plan for 3-hour treatment today as had another treatment yesterday.     History of sacral decubitus as per ID  Anemia of chronic disease continue ARUN therapy  Secondary hyperparathyroid disease of renal origin phosphate controlled on ferrous citrate therapy        Anahi Beverly MD  4/13/2022 12:25 PM

## 2022-04-13 NOTE — H&P
[id number] [facility]                  [address]                              HISTORY AND PHYSICAL    PATIENT NAME: Aftab Garay                      :        1950  MED REC NO:   [id number]                            ROOM:       4054  ACCOUNT NO:   [de-identified]                           ADMIT DATE: 2022  PROVIDER:     Heriberto Edward MD    DATE OF ADMISSION:  2022. CHIEF COMPLAINT:  Infected decubitus, possible UTI. Rule out sepsis. HISTORY OF PRESENT ILLNESS:  This 69-year-old with a history of sacral  decubitus, chronic kidney failure with dialysis, diabetes, and  hypertension, who presents to the emergency room with fever and chills  with possible sepsis, possible infected sacral decubitus ulcer, and UTI. At present, the patient states she is feeling better. She does have  some discomfort in the sacral area. Denies any chest pain or shortness  of breath. RECENT AND PRESENT MEDICATIONS:  See med rec in the chart. PAST MEDICAL HISTORY:  As described above. She is living at the nursing  home. FAMILY MEDICAL HISTORY:  Noncontributory. SOCIAL HISTORY:  Does not drink. Does not smoke. REVIEW OF SYSTEMS:  ALLERGIES:  No known allergies. SKIN AND LYMPHATICS:  She has been battling sacral decubitus for quite  some time. CIRCULATORY:  At present she denies chest pain, orthopnea, or PND. DIGESTIVE:  She denies nausea, vomiting, diarrhea, melena, hematochezia,  or hematemesis. GENITOURINARY:  Denies dysuria, frequency, or hematuria. MUSCULOSKELETAL:  She has severe chronic pain from osteoarthritis of the  knees. PHYSICAL EXAMINATION:  GENERAL:  The patient sitting up in bed eating breakfast, in no acute  distress. VITAL SIGNS:  Temperature is 97, pulse of 65, respirations 89 and  non-labored, blood pressure is 111/55, O2 sat is 100% on room air. SKIN:  Shows no pallor or jaundice.   Decubitus sacral areas noted.  Rest  of her skin looks normal.  CHEST:  Clear to auscultation. HEART:  Regular rate and rhythm without murmur, gallop, or rub. NECK:  Supple without bruits or masses. ABDOMEN:  Obese, soft, and nontender. EXTREMITIES:  Reveal minimal edema in the extremities. NEUROLOGICAL:  She is alert and oriented. There does not appear to be  any focal deficits at this time. LABORATORY STUDIES:  Laboratories this morning show a sugar of 108. Creatinine 3.4. Mild elevations of her LFTs. Hemoglobin is 8.9 and  platelet count is 055,081. C-reactive protein is elevated at 21. Sed  rate is elevated at 123. DIAGNOSTIC IMPRESSION:  Rule out sepsis, possibly from an infected  sacral decubitus and possible UTI. Also a history of diabetes,  hypertension, chronic kidney failure on hemodialysis, and a remote  history of sarcoidosis. PLAN:  Once again, the patient was admitted. She is on IV antibiotics. We will continue dialysis per Nephrology. DISCHARGE PLAN:  Back to skilled nursing facility once stable.         Madelaine Oleary MD    D: 04/13/2022 7:28:54       T: 04/13/2022 7:31:26     /[email or web address]  Job#: [id number]     Doc#: [id number]    CC:

## 2022-04-13 NOTE — PROGRESS NOTES
[address] [place] [address] [facility]  AMANUEL  Progress Note    SUBJECTIVE:  Chief Complaint   Patient presents with    Fever     Pt has a sacral wound which she believes is infected. Pt states some chills. The patient is feeling better but he is still having pain around the coccygeal wound. No nausea or vomiting. No fever. Tolerating antibiotics    Review of systems:  As stated above in the chief complaint, otherwise negative.     Medications:  Scheduled Meds:   busPIRone  10 mg Oral BID    busPIRone  5 mg Oral Nightly    escitalopram  10 mg Oral Daily    docusate sodium  100 mg Oral BID    Ferric Citrate  2 tablet Oral TID WC    gabapentin  300 mg Oral TID    insulin glargine  10 Units SubCUTAneous Nightly    insulin lispro  10 Units SubCUTAneous TID WC    linaCLOtide  72 mcg Oral QAM AC    metoprolol succinate  25 mg Oral Daily    midodrine  15 mg Oral Once per day on     polyethylene glycol  17 g Oral Daily    senna  2 tablet Oral BID    vitamin D  2,000 Units Oral Daily    sodium chloride flush  5-40 mL IntraVENous 2 times per day    heparin (porcine)  5,000 Units SubCUTAneous 3 times per day    alogliptin  6.25 mg Oral Daily    epoetin derek-epbx  10,000 Units SubCUTAneous Once per day on     ceFAZolin  2,000 mg IntraVENous Daily     Continuous Infusions:   sodium chloride      dextrose       PRN Meds:glucose **OR** glucose, bisacodyl, magnesium hydroxide, midodrine, traZODone, sodium chloride flush, sodium chloride, ondansetron **OR** ondansetron, polyethylene glycol, acetaminophen **OR** acetaminophen, glucagon (rDNA), dextrose, dextrose bolus (hypoglycemia) **OR** dextrose bolus (hypoglycemia), albuterol, oxyCODONE-acetaminophen **AND** oxyCODONE    OBJECTIVE:  BP (!) 109/52   Pulse 66   Temp 98 °F (36.7 °C) (Oral)   Resp 18   Ht 5' 1\" (1.549 m)   Wt 203 lb 4.2 oz (92.2 kg)   SpO2 100%   BMI 38.41 kg/m²   Temp  Av.9 °F (36.6 °C)  Min: 97 °F (36.1 °C)  Max: 98.4 °F (36.9 °C)  Constitutional: The patient is awake, alert, and oriented. Lying in bed. No distress. Skin: Warm and dry. No rashes were noted. HEENT: Round and reactive pupils. Moist mucous membranes. No ulcerations or thrush. Neck: Supple to movements. Chest: No use of accessory muscles to breathe. Symmetrical expansion. No wheezing, crackles or rhonchi. Tunneled HD catheter. Cardiovascular: S1 and S2 are rhythmic and regular. No murmurs appreciated. Abdomen: Positive bowel sounds to auscultation. Benign to palpation. Back: Coccygeal wound and buttock wounds are dressed. The erythema over the buttock and hips is darker. Still somewhat tender to the touch. Extremities: No edema.   Lines: peripheral    Laboratory and Tests Review:  Lab Results   Component Value Date    WBC 5.6 04/13/2022    WBC 7.8 04/11/2022    WBC 13.0 (H) 02/10/2022    HGB 8.9 (L) 04/13/2022    HCT 28.6 (L) 04/13/2022    .5 (H) 04/13/2022     04/13/2022     Lab Results   Component Value Date    NEUTROABS 3.65 04/13/2022    NEUTROABS 6.38 04/11/2022    NEUTROABS 7.37 (H) 02/07/2022     No results found for: [facility]  Lab Results   Component Value Date    ALT 34 (H) 04/13/2022     (H) 04/13/2022    ALKPHOS 115 (H) 04/13/2022    BILITOT <0.2 04/13/2022     Lab Results   Component Value Date     04/13/2022    K 4.5 04/13/2022    CL 99 04/13/2022    CO2 29 04/13/2022    BUN 23 04/13/2022    CREATININE 3.4 04/13/2022    CREATININE 3.9 04/11/2022    CREATININE 3.2 02/10/2022    GFRAA 16 04/13/2022    LABGLOM 13 04/13/2022    GLUCOSE 107 04/13/2022    GLUCOSE 149 10/12/2011    PROT 6.0 04/13/2022    LABALBU 3.0 04/13/2022    LABALBU 4.1 10/12/2011    CALCIUM 9.1 04/13/2022    BILITOT <0.2 04/13/2022    ALKPHOS 115 04/13/2022     04/13/2022    ALT 34 04/13/2022     Lab Results   Component Value Date    CRP 21.1 (H) 04/12/2022    CRP 19.3 (H) 05/24/2020    CRP 15.8 (H) 05/21/2020 Lab Results   Component Value Date    BHCOJMW 027 (H) 04/12/2022    SEDRATE 45 (H) 08/13/2019    SEDRATE 35 (H) 08/16/2013     Radiology:      Microbiology:   Blood cultures 4/11/2022: Staphylococcus epidermidis in 1 of 4 bottles  Rapid influenza: Negative  Rapid SARS-CoV-2: Negative  Wound culture 4/12/2022: Streptococcus pyogenes, GNR, Proteus    ASSESSMENT:  · Cellulitis of the right buttock and hip. The port of entry is most likely the hip or coccygeal wound. The organisms response was most likely Streptococcus pyogenous. The other 2 organisms are probably representing colonization and do not need to be treated    PLAN:  · Continue Cefazolin.   We will most likely switch her over to an oral antibiotic by tomorrow and let her go home  · Check final cultures  · Monitor labs    Vijaya Theodore MD  12:11 PM  4/13/2022

## 2022-04-14 VITALS
DIASTOLIC BLOOD PRESSURE: 58 MMHG | WEIGHT: 203.26 LBS | OXYGEN SATURATION: 98 % | BODY MASS INDEX: 38.38 KG/M2 | RESPIRATION RATE: 16 BRPM | TEMPERATURE: 98 F | HEART RATE: 69 BPM | SYSTOLIC BLOOD PRESSURE: 116 MMHG | HEIGHT: 61 IN

## 2022-04-14 PROBLEM — U07.1 2019 NOVEL CORONAVIRUS DISEASE (COVID-19): Status: RESOLVED | Noted: 2020-05-12 | Resolved: 2022-04-14

## 2022-04-14 LAB
ANAEROBIC CULTURE: NORMAL
METER GLUCOSE: 142 MG/DL (ref 74–99)
METER GLUCOSE: 89 MG/DL (ref 74–99)

## 2022-04-14 PROCEDURE — 2580000003 HC RX 258: Performed by: FAMILY MEDICINE

## 2022-04-14 PROCEDURE — 6370000000 HC RX 637 (ALT 250 FOR IP): Performed by: FAMILY MEDICINE

## 2022-04-14 PROCEDURE — 6360000002 HC RX W HCPCS: Performed by: FAMILY MEDICINE

## 2022-04-14 PROCEDURE — 82962 GLUCOSE BLOOD TEST: CPT

## 2022-04-14 PROCEDURE — 6360000002 HC RX W HCPCS: Performed by: SPECIALIST

## 2022-04-14 RX ORDER — CEPHALEXIN 500 MG/1
500 CAPSULE ORAL DAILY
DISCHARGE
Start: 2022-04-14 | End: 2022-04-14

## 2022-04-14 RX ORDER — CEPHALEXIN 500 MG/1
500 CAPSULE ORAL DAILY
Status: DISCONTINUED | OUTPATIENT
Start: 2022-04-14 | End: 2022-04-14 | Stop reason: HOSPADM

## 2022-04-14 RX ORDER — CEPHALEXIN 500 MG/1
500 CAPSULE ORAL NIGHTLY
Qty: 10 CAPSULE | Refills: 0 | DISCHARGE
Start: 2022-04-14 | End: 2022-04-24

## 2022-04-14 RX ADMIN — Medication 2000 MG: at 09:21

## 2022-04-14 RX ADMIN — BUSPIRONE HYDROCHLORIDE 10 MG: 5 TABLET ORAL at 09:09

## 2022-04-14 RX ADMIN — DOCUSATE SODIUM 100 MG: 100 CAPSULE, LIQUID FILLED ORAL at 09:09

## 2022-04-14 RX ADMIN — Medication 2000 UNITS: at 09:09

## 2022-04-14 RX ADMIN — SODIUM CHLORIDE, PRESERVATIVE FREE 10 ML: 5 INJECTION INTRAVENOUS at 09:21

## 2022-04-14 RX ADMIN — GABAPENTIN 300 MG: 300 CAPSULE ORAL at 09:09

## 2022-04-14 RX ADMIN — HEPARIN SODIUM 5000 UNITS: 10000 INJECTION INTRAVENOUS; SUBCUTANEOUS at 06:30

## 2022-04-14 RX ADMIN — ALOGLIPTIN 6.25 MG: 6.25 TABLET, FILM COATED ORAL at 09:09

## 2022-04-14 RX ADMIN — SENNOSIDES 17.2 MG: 8.6 TABLET, FILM COATED ORAL at 09:09

## 2022-04-14 RX ADMIN — ESCITALOPRAM OXALATE 10 MG: 10 TABLET ORAL at 09:09

## 2022-04-14 RX ADMIN — METOPROLOL SUCCINATE 25 MG: 25 TABLET, EXTENDED RELEASE ORAL at 09:08

## 2022-04-14 ASSESSMENT — PAIN SCALES - GENERAL: PAINLEVEL_OUTOF10: 5

## 2022-04-14 NOTE — PROGRESS NOTES
Report called to registered nurse Susie Tracey at [facility]. AVS paperwork is being prepared to send with ambulance crew.

## 2022-04-14 NOTE — PROGRESS NOTES
[address] [place] [address] [facility]  AMANUEL  Progress Note    SUBJECTIVE:  Chief Complaint   Patient presents with    Fever     Pt has a sacral wound which she believes is infected. Pt states some chills. Improving pain around the coccygeal wound. No nausea or vomiting. Appetite is good but does not like the food. No fever. Tolerating antibiotics    Review of systems:  As stated above in the chief complaint, otherwise negative.     Medications:  Scheduled Meds:   white petrolatum   Topical BID    busPIRone  10 mg Oral BID    busPIRone  5 mg Oral Nightly    escitalopram  10 mg Oral Daily    docusate sodium  100 mg Oral BID    Ferric Citrate  2 tablet Oral TID WC    gabapentin  300 mg Oral TID    insulin glargine  10 Units SubCUTAneous Nightly    insulin lispro  10 Units SubCUTAneous TID WC    linaCLOtide  72 mcg Oral QAM AC    metoprolol succinate  25 mg Oral Daily    midodrine  15 mg Oral Once per day on     polyethylene glycol  17 g Oral Daily    senna  2 tablet Oral BID    vitamin D  2,000 Units Oral Daily    sodium chloride flush  5-40 mL IntraVENous 2 times per day    heparin (porcine)  5,000 Units SubCUTAneous 3 times per day    alogliptin  6.25 mg Oral Daily    epoetin derek-epbx  10,000 Units SubCUTAneous Once per day on     ceFAZolin  2,000 mg IntraVENous Daily     Continuous Infusions:   sodium chloride      dextrose       PRN Meds:glucose **OR** glucose, bisacodyl, magnesium hydroxide, midodrine, traZODone, sodium chloride flush, sodium chloride, ondansetron **OR** ondansetron, polyethylene glycol, acetaminophen **OR** acetaminophen, glucagon (rDNA), dextrose, dextrose bolus (hypoglycemia) **OR** dextrose bolus (hypoglycemia), albuterol, oxyCODONE-acetaminophen **AND** oxyCODONE    OBJECTIVE:  BP (!) 116/58   Pulse 69   Temp 98 °F (36.7 °C) (Oral)   Resp 16   Ht 5' 1\" (1.549 m)   Wt 203 lb 4.2 oz (92.2 kg)   SpO2 98%   BMI 38.41 kg/m²   Temp  Av °F (36.7 °C)  Min: 97.7 °F (36.5 °C)  Max: 98.2 °F (36.8 °C)  Constitutional: The patient is awake, alert, and oriented. Lying in bed. No distress. Skin: Warm and dry. No rashes were noted. HEENT: Round and reactive pupils. Moist mucous membranes. No ulcerations or thrush. Neck: Supple to movements. Chest: No use of accessory muscles to breathe. Symmetrical expansion. No wheezing, crackles or rhonchi. Tunneled HD catheter. Cardiovascular: S1 and S2 are rhythmic and regular. No murmurs appreciated. Abdomen: Positive bowel sounds to auscultation. Benign to palpation. Back: Coccygeal wound and buttock wounds are dressed. The erythema over the buttock and hips is darker. Still somewhat tender to the touch. Extremities: No edema.   Lines: peripheral        Laboratory and Tests Review:  Lab Results   Component Value Date    WBC 5.6 04/13/2022    WBC 7.8 04/11/2022    WBC 13.0 (H) 02/10/2022    HGB 8.9 (L) 04/13/2022    HCT 28.6 (L) 04/13/2022    .5 (H) 04/13/2022     04/13/2022     Lab Results   Component Value Date    NEUTROABS 3.65 04/13/2022    NEUTROABS 6.38 04/11/2022    NEUTROABS 7.37 (H) 02/07/2022     No results found for: [facility]  Lab Results   Component Value Date    ALT 34 (H) 04/13/2022     (H) 04/13/2022    ALKPHOS 115 (H) 04/13/2022    BILITOT <0.2 04/13/2022     Lab Results   Component Value Date     04/13/2022    K 4.5 04/13/2022    CL 99 04/13/2022    CO2 29 04/13/2022    BUN 23 04/13/2022    CREATININE 3.4 04/13/2022    CREATININE 3.9 04/11/2022    CREATININE 3.2 02/10/2022    GFRAA 16 04/13/2022    LABGLOM 13 04/13/2022    GLUCOSE 107 04/13/2022    GLUCOSE 149 10/12/2011    PROT 6.0 04/13/2022    LABALBU 3.0 04/13/2022    LABALBU 4.1 10/12/2011    CALCIUM 9.1 04/13/2022    BILITOT <0.2 04/13/2022    ALKPHOS 115 04/13/2022     04/13/2022    ALT 34 04/13/2022     Lab Results   Component Value Date    CRP 21.1 (H) 04/12/2022    CRP 19.3 (H) 05/24/2020    CRP 15.8 (H) 05/21/2020     Lab Results   Component Value Date    SEDRATE 123 (H) 04/12/2022    SEDRATE 45 (H) 08/13/2019    SEDRATE 35 (H) 08/16/2013     Radiology:      Microbiology:   Blood cultures 4/11/2022: Staphylococcus epidermidis in 1 of 4 bottles  Rapid influenza: Negative  Rapid SARS-CoV-2: Negative  Wound culture 4/12/2022: Streptococcus pyogenes, GNR, Proteus    ASSESSMENT:  Cellulitis of the right buttock and hip. The port of entry is most likely the hip or coccygeal wound. The organisms response was most likely Streptococcus pyogenous. The other 2 organisms are probably representing colonization and do not need to be treated    PLAN:  stop Cefazolin, switch her over to cephalexin for discharge  Monitor labs  [id number] Alley An to discharge from [address] [place] [address] 68, APRN - CNP  10:14 AM  4/14/2022    Patient seen and examined. I had a face to face encounter with the patient. Agree with exam.  Assessment and plan as outlined above and directed by me. Addition and corrections were done as deemed appropriate. My exam and plan include: Patient is doing well. Less pain. Tolerating antibiotics. She can be discharged on Cephalexin.     Ravin Greenfield MD  4/14/2022  3:06 PM

## 2022-04-14 NOTE — PROGRESS NOTES
Subjective: The patient is awake and alert. No problems overnight. Denies chest pain, angina, and dyspnea. Denies abdominal pain. Tolerating diet. No nausea or vomiting. Objective:    BP (!) 145/65   Pulse 73   Temp 98 °F (36.7 °C) (Oral)   Resp 16   Ht 5' 1\" (1.549 m)   Wt 203 lb 4.2 oz (92.2 kg)   SpO2 98%   BMI 38.41 kg/m²     Heart:  RRR, no murmurs, gallops, or rubs.   Lungs:  CTA bilaterally, no wheeze, rales or rhonchi  Abd: bowel sounds present, nontender, nondistended, no masses  Extrem:  No clubbing, cyanosis, or edema    CBC with Differential:    Lab Results   Component Value Date    WBC 5.6 04/13/2022    RBC 2.66 04/13/2022    HGB 8.9 04/13/2022    HCT 28.6 04/13/2022     04/13/2022    .5 04/13/2022    MCH 33.5 04/13/2022    MCHC 31.1 04/13/2022    RDW 15.5 04/13/2022    NRBC 0.9 05/25/2020    SEGSPCT 71 08/16/2013    BANDSPCT 1 03/29/2016    METASPCT 0.9 05/10/2020    LYMPHOPCT 17.6 04/13/2022    PROMYELOPCT 0.9 05/09/2020    MONOPCT 10.6 04/13/2022    MYELOPCT 1.7 05/11/2020    BASOPCT 0.5 04/13/2022    MONOSABS 0.59 04/13/2022    LYMPHSABS 0.98 04/13/2022    EOSABS 0.29 04/13/2022    BASOSABS 0.03 04/13/2022     CMP:    Lab Results   Component Value Date     04/13/2022    K 4.5 04/13/2022    CL 99 04/13/2022    CO2 29 04/13/2022    BUN 23 04/13/2022    CREATININE 3.4 04/13/2022    GFRAA 16 04/13/2022    LABGLOM 13 04/13/2022    GLUCOSE 107 04/13/2022    GLUCOSE 149 10/12/2011    PROT 6.0 04/13/2022    LABALBU 3.0 04/13/2022    LABALBU 4.1 10/12/2011    CALCIUM 9.1 04/13/2022    BILITOT <0.2 04/13/2022    ALKPHOS 115 04/13/2022     04/13/2022    ALT 34 04/13/2022        Assessment:    Patient Active Problem List   Diagnosis    Neurologic gait dysfunction    [facility] failure, acute on chronic [facility]    Diabetes mellitus ([facility]    Hypertension    Arthritis    Kidney disease    Knee problem    Anemia due to stage 3 chronic kidney disease    Primary osteoarthritis of both knees    Acute on chronic renal insufficiency    Acute kidney injury superimposed on CKD (HCC)    Acute renal failure superimposed on chronic kidney disease, on chronic dialysis (HCC)    Moderate protein-calorie malnutrition (HCC)    PERLA (acute kidney injury) ([facility]    Stage 5 chronic kidney disease on chronic dialysis (HCC)    Pressure injury of sacral region, stage 4 (HCC)    Pressure injury of right hip, stage 3 (HCC)    Syncope and collapse    Unresponsive episode    Left bundle branch block    Pure hypercholesterolemia    Moderate obesity    Sepsis ([facility]       Plan:  Possible Discharge if ok with ID        Christella Siemens, MD  7:22 AM  4/14/2022

## 2022-04-14 NOTE — CARE COORDINATION
Social Work / Discharge PLanning: [facility]. VM left with Analy updated from [address]. Patient updated as well as son Anders Modi. Charge RN and RN updated. SW to follow.  Electronically signed by SHANTA Iniguez on 4/14/22 at 12:11 PM EDT

## 2022-04-14 NOTE — PROGRESS NOTES
P Quality Flow/Interdisciplinary Rounds Progress Note        Quality Flow Rounds held on April 14, 2022    Disciplines Attending:  Bedside Nurse, ,  and Nursing Unit Leadership    Aidan Kramer was admitted on 4/11/2022 10:09 PM    Anticipated Discharge Date:  Expected Discharge Date: 04/14/22 (estimate)    Disposition:    Herbert Score:  Herbert Scale Score: 14    Readmission Risk              Risk of Unplanned Readmission:  30           Discussed patient goal for the day, patient clinical progression, and barriers to discharge. The following Goal(s) of the Day/Commitment(s) have been identified:  bridge to PO atb? discharge?       Katiana Bueno RN  April 14, 2022

## 2022-04-15 LAB
ORGANISM: ABNORMAL
WOUND/ABSCESS: ABNORMAL
WOUND/ABSCESS: ABNORMAL

## 2022-04-15 ASSESSMENT — ENCOUNTER SYMPTOMS
BACK PAIN: 0
CONSTIPATION: 0
NAUSEA: 0
ABDOMINAL DISTENTION: 0
STRIDOR: 0
DIARRHEA: 0
COLOR CHANGE: 0
COUGH: 0
ABDOMINAL PAIN: 0
SHORTNESS OF BREATH: 0
WHEEZING: 0
VOMITING: 0

## 2022-04-15 NOTE — ED PROVIDER NOTES
[address] [facility]      Pt Name: Lima Pederson  MRN: [id number]  [place] 1950  Date of evaluation: 4/11/2022      CHIEF COMPLAINT       Chief Complaint   Patient presents with    Fever     Pt has a sacral wound which she believes is infected. Pt states some chills. HPI  Lima Pederson is a 66 y.o. female history of diabetes, obesity, and sacral wound she has had for 2 years presents here for fever. Patient was evaluated at her nursing facility. States that the wound looks \"worse\" for the last couple of days. States that she has been having symptoms of fever, fatigue, and generalized malaise. Patient states that she may take 3 or 4 steps daily. States that this is a chronic issue. States that she only gets up to transfer from bed to chair only. No leaving exacerbating factors. Patient has not taken any medications or measures alleviate symptoms. Denies any chest pain, shortness of breath, abdominal pain, dysuria, hematuria, diarrhea, constipation, new rashes or sores. Except as noted above the remainder of the review of systems was reviewed and negative. Review of Systems   Constitutional: Positive for appetite change, fatigue and fever. Negative for activity change, diaphoresis and unexpected weight change. HENT: Negative for congestion. Eyes: Negative for visual disturbance. Respiratory: Negative for cough, shortness of breath, wheezing and stridor. Cardiovascular: Negative for chest pain, palpitations and leg swelling. Gastrointestinal: Negative for abdominal distention, abdominal pain, constipation, diarrhea, nausea and vomiting. Endocrine: Negative for polyphagia and polyuria. Genitourinary: Negative for dysuria and hematuria. Musculoskeletal: Negative for back pain. Skin: Positive for wound. Negative for color change, pallor and rash. Neurological: Negative for dizziness and syncope.    Hematological: Negative for adenopathy. Does not bruise/bleed easily. Psychiatric/Behavioral: Negative for agitation. Physical Exam  Vitals and nursing note reviewed. Constitutional:       General: She is not in acute distress. Appearance: Normal appearance. She is obese. She is not ill-appearing or diaphoretic. HENT:      Head: Normocephalic and atraumatic. Right Ear: External ear normal.      Left Ear: External ear normal.      Nose: Nose normal.      Mouth/Throat:      Mouth: Mucous membranes are moist.      Pharynx: Oropharynx is clear. Eyes:      Extraocular Movements: Extraocular movements intact. Pupils: Pupils are equal, round, and reactive to light. Cardiovascular:      Rate and Rhythm: Normal rate and regular rhythm. Pulses: Normal pulses. Heart sounds: Normal heart sounds. Pulmonary:      Effort: Pulmonary effort is normal.      Breath sounds: Normal breath sounds. Abdominal:      General: Abdomen is flat. Palpations: Abdomen is soft. Tenderness: There is no abdominal tenderness. There is no right CVA tenderness, left CVA tenderness or rebound. Negative signs include Carrera's sign, Rovsing's sign and McBurney's sign. Comments: Protuberant abdomen. Genitourinary:     Comments: No perineal abscess appreciated. No crepitus. Musculoskeletal:         General: Normal range of motion. Cervical back: Normal range of motion. Skin:     General: Skin is warm and dry. Capillary Refill: Capillary refill takes less than 2 seconds. Comments: Stage IV sacral decubitus ulcer. Foul-smelling. Neurological:      General: No focal deficit present. Mental Status: She is alert and oriented to person, place, and time.    Psychiatric:         Mood and Affect: Mood normal.          Procedures     MDM  Number of Diagnoses or Management Options  Chronic kidney disease, unspecified CKD stage  Pressure injury of sacral region, stage 4 (HCC)  Sepsis without acute organ dysfunction, due to unspecified organism [facility])  Diagnosis management comments: 76-year-old female history of obesity, poor ambulation, chronic sacral wound, diabetes presents with fever from nursing facility. Vitals significant here for pyrexia and tachycardia. On physical exam patient is in no acute distress but does look uncomfortable. She is warm to the touch and had a fever here in the department. Lungs are clear to auscultation bilaterally no wheeze rales rhonchi. Sacral wound is foul-smelling. Stage IV decubitus ulcer. No other areas of erythema or fluctuation around the sacral wound. No area of gangrene. Chaperone present at bedside.  exam performed. There is no areas of crepitus at the patient's perineum or abscesses appreciated. Diagnostic labs and imaging interpreted and reviewed. Imaging of abdomen pelvis shows no signs of free air in the subcutaneous or abdominal area. I do not believe this is necrotizing fasciitis. I do believe that this chronic wound of her sacrum is worse and is infected as it is foul-smelling. Patient will be given pseudomonal antibiotic coverage as well as MRSA. Patient hydrated in the department and given Tylenol. Patient admitted to the hospital for sepsis. Amount and/or Complexity of Data Reviewed  Decide to obtain previous medical records or to obtain history from someone other than the patient: yes                         ED Course as of 04/15/22 [facility] Apr 11, 2022   2348 Rivka Shelton admit. [JV]      ED Course User Index  [JV] Sandrine Cantrell MD         ED Course as of 04/15/22 1050   Mon Apr 11, 2022   2348 Rivka Peoples admit.  [JV]      ED Course User Index  [JV] Sandrine Cantrell MD       --------------------------------------------- PAST HISTORY ---------------------------------------------  Past Medical History:  has a past medical history of Anemia in chronic kidney disease, Anxiety and depression, Arthritis, Chronic pain syndrome, COVID-19, COVID-19, Diabetes mellitus ([facility] [address] Dysphagia, oral phase, GERD (gastroesophageal reflux disease), Hemodialysis patient ([facility] [address] Hyperkalemia, Hypertension, Hypertensive kidney disease with stage 4 chronic kidney disease ([facility] [address] Insomnia, Kidney stones, MDD (major depressive disorder), Moderate protein-calorie malnutrition ([facility] [address] Morbid obesity ([facility] [address] Muscle weakness (generalized), Obesity, Pressure ulcer of sacral region, Sacral pressure ulcer, Unspecified osteoarthritis, unspecified site, and Weakness generalized. Past Surgical History:  has a past surgical history that includes Foot surgery ( ); Cystocopy (2011 );  section (x3); Upper gastrointestinal endoscopy (2.18.15); Cholecystectomy (3/31/16); Abdomen surgery (N/A, 2020); Upper gastrointestinal endoscopy (N/A, 2020); Upper gastrointestinal endoscopy (N/A, 2020); vascular surgery (N/A, 2021); Dialysis fistula creation (Left, 2021); and Dialysis fistula creation (Right, 2021). Social History:  reports that she quit smoking about 10 years ago. She has a 30.00 pack-year smoking history. She has never used smokeless tobacco. She reports that she does not drink alcohol and does not use drugs. Family History: family history includes Cancer in her sister. The patients home medications have been reviewed. Allergies: Patient has no known allergies.     -------------------------------------------------- RESULTS -------------------------------------------------    LABS:  Results for orders placed or performed during the hospital encounter of 22   Culture, Blood 1    Specimen: Blood   Result Value Ref Range    Blood Culture, Routine 24 Hours no growth    Culture, Blood 2    Specimen: Blood   Result Value Ref Range    Culture, Blood 2 (A)      Gram stain performed from blood culture bottle media  Gram positive cocci in clusters      Organism Staphylococcus coagulase-negative (A)     Culture, Blood 2       This organism was isolated in one set. Susceptibility testing is not routinely done as this  organism frequently represents skin contamination. Additional testing can be ordered by calling the  Microbiology Department. COVID-19, Rapid    Specimen: Nasopharyngeal Swab   Result Value Ref Range    SARS-CoV-2, NAAT Not Detected Not Detected   RAPID INFLUENZA A/B ANTIGENS    Specimen: Nasopharyngeal   Result Value Ref Range    Influenza A by PCR Not Detected Not Detected    Influenza B by PCR Not Detected Not Detected   Culture, Anaerobic    Specimen: Back   Result Value Ref Range    Anaerobic Culture       Swab collections are low-yield and rarely indicated. Generally, the specimen volume when collected by swab is  small, reducing the probability of isolating organisms: many  organisms adhere to the fibers of the swab, which reduces the  opportunity of recovering organisms. Anaerobes not isolated     Culture, Wound    Specimen: Back   Result Value Ref Range    WOUND/ABSCESS (A)      Mixed zaida isolated. Further workup and sensitivity testing  is not routinely indicated and will not be performed.   Mixed zaida isolated includes:  Proteus species  Gram negative rods      Organism Strep pyogenes (Beta Strep Group A) (A)     WOUND/ABSCESS Heavy growth        Susceptibility    Strep pyogenes (beta strep group a) - BACTERIAL SUSCEPTIBILITY PANEL BY ERWIN     ampicillin <=^0.25 Sensitive mcg/mL     cefTRIAXone <=^0.12 Sensitive mcg/mL     clindamycin >=^1 Resistant mcg/mL     vancomycin <=^0.12 Sensitive mcg/mL   CBC with Auto Differential   Result Value Ref Range    WBC 7.8 4.5 - 11.5 E9/L    RBC 2.76 (L) 3.50 - 5.50 E12/L    Hemoglobin 9.2 (L) 11.5 - 15.5 g/dL    Hematocrit 29.3 (L) 34.0 - 48.0 %    .2 (H) 80.0 - 99.9 fL    MCH 33.3 26.0 - 35.0 pg    MCHC 31.4 (L) 32.0 - 34.5 %    RDW 15.8 (H) 11.5 - 15.0 fL    Platelets 989 631 - 614 E9/L    MPV 10.0 7.0 - 12.0 fL    Neutrophils % 81.5 (H) 43.0 - 80.0 %    Immature Granulocytes % 0.8 0.0 - 5.0 %    Lymphocytes % 6.0 (L) 20.0 - 42.0 %    Monocytes % 11.5 2.0 - 12.0 %    Eosinophils % 0.1 0.0 - 6.0 %    Basophils % 0.1 0.0 - 2.0 %    Neutrophils Absolute 6.38 1.80 - 7.30 E9/L    Immature Granulocytes # 0.06 E9/L    Lymphocytes Absolute 0.47 (L) 1.50 - 4.00 E9/L    Monocytes Absolute 0.90 0.10 - 0.95 E9/L    Eosinophils Absolute 0.01 (L) 0.05 - 0.50 E9/L    Basophils Absolute 0.01 0.00 - 0.20 E9/L    Poikilocytes 1+     Ovalocytes 1+    Comprehensive Metabolic Panel   Result Value Ref Range    Sodium 134 132 - 146 mmol/L    Potassium 5.1 (H) 3.5 - 5.0 mmol/L    Chloride 97 (L) 98 - 107 mmol/L    CO2 27 22 - 29 mmol/L    Anion Gap 10 7 - 16 mmol/L    Glucose 212 (H) 74 - 99 mg/dL    BUN 34 (H) 6 - 23 mg/dL    CREATININE 3.9 (H) 0.5 - 1.0 mg/dL    GFR Non-African American 11 >=60 mL/min/1.73    GFR African American 14     Calcium 8.9 8.6 - 10.2 mg/dL    Total Protein 6.7 6.4 - 8.3 g/dL    Albumin 2.6 (L) 3.5 - 5.2 g/dL    Total Bilirubin 0.3 0.0 - 1.2 mg/dL    Alkaline Phosphatase 78 35 - 104 U/L    ALT 11 0 - 32 U/L    AST 26 0 - 31 U/L   Magnesium   Result Value Ref Range    Magnesium 2.2 1.6 - 2.6 mg/dL   Phosphorus   Result Value Ref Range    Phosphorus 3.1 2.5 - 4.5 mg/dL   Urinalysis with Microscopic   Result Value Ref Range    Color, UA Yellow Straw/Yellow    Clarity, UA TURBID (A) Clear    Glucose, Ur Negative Negative mg/dL    Bilirubin Urine Negative Negative    Ketones, Urine Negative Negative mg/dL    Specific Gravity, UA 1.020 1.005 - 1.030    Blood, Urine MODERATE (A) Negative    pH, UA 7.0 5.0 - 9.0    Protein, UA >=300 (A) Negative mg/dL    Urobilinogen, Urine 0.2 <2.0 E.U./dL    Nitrite, Urine Negative Negative    Leukocyte Esterase, Urine LARGE (A) Negative    WBC, UA >20 (A) 0 - 5 /HPF    RBC, UA >20 0 - 2 /HPF    Epithelial Cells, UA FEW /HPF    Bacteria, UA MODERATE (A) None Seen /HPF   pH, venous   Result Value Ref Range    pH, Lopez 7.48 (H) 7.35 - 7.45 Beta-Hydroxybutyrate   Result Value Ref Range    Beta-Hydroxybutyrate 0.23 0.02 - 0.27 mmol/L   Lactate, Sepsis   Result Value Ref Range    Lactic Acid, Sepsis 1.9 0.5 - 1.9 mmol/L   SPECIMEN REJECTION   Result Value Ref Range    Rejected Test trp5     Reason for Rejection see below    Troponin   Result Value Ref Range    Troponin, High Sensitivity 130 (H) 0 - 9 ng/L   Potassium   Result Value Ref Range    Potassium 4.2 3.5 - 5.0 mmol/L   Troponin   Result Value Ref Range    Troponin, High Sensitivity 172 (H) 0 - 9 ng/L   Hepatitis Panel, Acute   Result Value Ref Range    Hep A IgM Non-Reactive Non-Reactive    Hep B Core Ab, IgM Non-Reactive Non-Reactive    Hep B S Ag Interp Non-Reactive Non-Reactive    Hep C Ab Interp Non-Reactive Non-Reactive   Hepatitis B Surface Antibody   Result Value Ref Range    Hep B S Ab Non-Reactive Non-Reactive   Antistreptolysin O Titer   Result Value Ref Range    ASO 34 0 - 200 IU/mL   C-Reactive Protein   Result Value Ref Range    CRP 21.1 (H) 0.0 - 0.4 mg/dL   Sedimentation Rate   Result Value Ref Range    Sed Rate 123 (H) 0 - 20 mm/Hr   Comprehensive Metabolic Panel w/ Reflex to MG   Result Value Ref Range    Sodium 137 132 - 146 mmol/L    Potassium reflex Magnesium 4.5 3.5 - 5.0 mmol/L    Chloride 99 98 - 107 mmol/L    CO2 29 22 - 29 mmol/L    Anion Gap 9 7 - 16 mmol/L    Glucose 107 (H) 74 - 99 mg/dL    BUN 23 6 - 23 mg/dL    CREATININE 3.4 (H) 0.5 - 1.0 mg/dL    GFR Non-African American 13 >=60 mL/min/1.73    GFR African American 16     Calcium 9.1 8.6 - 10.2 mg/dL    Total Protein 6.0 (L) 6.4 - 8.3 g/dL    Albumin 3.0 (L) 3.5 - 5.2 g/dL    Total Bilirubin <0.2 0.0 - 1.2 mg/dL    Alkaline Phosphatase 115 (H) 35 - 104 U/L    ALT 34 (H) 0 - 32 U/L     (H) 0 - 31 U/L   CBC with Auto Differential   Result Value Ref Range    WBC 5.6 4.5 - 11.5 E9/L    RBC 2.66 (L) 3.50 - 5.50 E12/L    Hemoglobin 8.9 (L) 11.5 - 15.5 g/dL    Hematocrit 28.6 (L) 34.0 - 48.0 %    .5 (H) 80.0 - 99.9 fL    MCH 33.5 26.0 - 35.0 pg    MCHC 31.1 (L) 32.0 - 34.5 %    RDW 15.5 (H) 11.5 - 15.0 fL    Platelets 842 667 - 576 E9/L    MPV 9.6 7.0 - 12.0 fL    Neutrophils % 65.7 43.0 - 80.0 %    Immature Granulocytes % 0.4 0.0 - 5.0 %    Lymphocytes % 17.6 (L) 20.0 - 42.0 %    Monocytes % 10.6 2.0 - 12.0 %    Eosinophils % 5.2 0.0 - 6.0 %    Basophils % 0.5 0.0 - 2.0 %    Neutrophils Absolute 3.65 1.80 - 7.30 E9/L    Immature Granulocytes # 0.02 E9/L    Lymphocytes Absolute 0.98 (L) 1.50 - 4.00 E9/L    Monocytes Absolute 0.59 0.10 - 0.95 E9/L    Eosinophils Absolute 0.29 0.05 - 0.50 E9/L    Basophils Absolute 0.03 0.00 - 0.20 E9/L   Blood ID, Molecular   Result Value Ref Range    Bottle Type Aerobic     Source of Blood Culture No site given     Order Number [id number]     Acinetobacter calcoac baumannii complex by PCR Not Detected Not Detected    Bacteroides fragilis by PCR Not Detected Not Detected    Enterobacteriaceae by PCR Not Detected Not Detected    Enterobacter cloacae complex by PCR Not Detected Not Detected    Enterococcus faecalis by PCR Not Detected Not Detected    Enterococcus faecium by PCR Not Detected Not Detected    Escherichia coli by PCR Not Detected Not Detected    Haemophilus Influenzae by PCR Not Detected Not Detected    Klebsiella aerogenes by PCR Not Detected Not Detected    Klebsiella oxytoca by PCR Not Detected Not Detected    Klebsiella pneumoniae group by PCR Not Detected Not Detected    Listeria monocytogenes by PCR Not Detected Not Detected    Neisseria meningitidis by PCR Not Detected Not Detected    Proteus species by PCR Not Detected Not Detected    Pseudomonas aeruginosa by PCR Not Detected Not Detected    Salmonella species by PCR Not Detected Not Detected    Streptococcus agalactiae by PCR Not Detected Not Detected    Staphylococcus aureus by PCR Not Detected Not Detected    Staphylococcus epidermidis by PCR DETECTED (AA) Not Detected    Staphylococcus lugdunensis by PCR Not Detected Not Detected    Staphylococcus species by PCR DETECTED (AA) Not Detected    Serratia marcescens by PCR Not Detected Not Detected    Streptococcus pneumoniae by PCR Not Detected Not Detected    Streptococcus pyogenes  by PCR Not Detected Not Detected    Streptococcus species by PCR Not Detected Not Detected    Stenotrophomonas maltophilia by PCR Not Detected Not Detected    Candida albicans by PCR Not Detected Not Detected    Candida auris by PCR Not Detected Not Detected    Candida glabrata by PCR Not Detected Not Detected    Candida krusei by PCR Not Detected Not Detected    Candida parapsilosis by PCR Not Detected Not Detected    Candida tropicalis by PCR Not Detected Not Detected    Cryptococcus neoformans/gattii by PCR Not Detected Not Detected    Methicillin Resistance mecA/C  by PCR DETECTED (AA) Not Detected   POCT Glucose   Result Value Ref Range    Meter Glucose 77 74 - 99 mg/dL   POCT Glucose   Result Value Ref Range    Meter Glucose 137 (H) 74 - 99 mg/dL   POCT Glucose   Result Value Ref Range    Meter Glucose 127 (H) 74 - 99 mg/dL   POCT Glucose   Result Value Ref Range    Meter Glucose 108 (H) 74 - 99 mg/dL   POCT Glucose   Result Value Ref Range    Meter Glucose 139 (H) 74 - 99 mg/dL   POCT Glucose   Result Value Ref Range    Meter Glucose 143 (H) 74 - 99 mg/dL   POCT Glucose   Result Value Ref Range    Meter Glucose 174 (H) 74 - 99 mg/dL   POCT Glucose   Result Value Ref Range    Meter Glucose 89 74 - 99 mg/dL   POCT Glucose   Result Value Ref Range    Meter Glucose 142 (H) 74 - 99 mg/dL   EKG 12 Lead   Result Value Ref Range    Ventricular Rate 102 BPM    Atrial Rate 102 BPM    P-R Interval 192 ms    QRS Duration 148 ms    Q-T Interval 400 ms    QTc Calculation (Bazett) 521 ms    P Axis 75 degrees    R Axis -30 degrees    T Axis 125 degrees       RADIOLOGY:  CT ABDOMEN PELVIS W IV CONTRAST Additional Contrast? None   Final Result   Sacral decubitus ulcer, with erosive changes involving the sacrum and coccyx,   as well as pockets of subcutaneous emphysema, which may represent infectious   changes. Overall, the appearance is stable compared to the previous exam.   The possibility of chronic sacrococcygeal osteomyelitis cannot be excluded. No acute intra-abdominal or pelvic findings. Specifically, no bowel   obstruction or obstructive uropathy. Indeterminate left lower pole renal lesion, which is not optimally   characterized on this exam.  The attenuation is not consistent with that of a   simple cyst.  In the appropriate setting, possibility of a neoplasm cannot be   excluded. Correlation with a multiphasic contrast enhanced CT or MRI or   alternatively, ultrasound is suggested. Nonobstructing renal calculi bilaterally. No obstructive uropathy. XR CHEST PORTABLE   Final Result   No acute findings. Cardiomegaly and stable chronic interstitial changes. EKG:  This EKG is signed and interpreted by me. Heart rate 100. Sinus tachycardia. Left axis deviation. LBBB. QTc prolonged. No ST elevations or depressions. Stable compared to previous EKG.      ------------------------- NURSING NOTES AND VITALS REVIEWED ---------------------------  Date / Time Roomed:  4/11/2022 10:09 PM  ED Bed Assignment:  [id number]    The nursing notes within the ED encounter and vital signs as below have been reviewed. No data found. Oxygen Saturation Interpretation: Normal    ------------------------------------------ PROGRESS NOTES ------------------------------------------    Counseling:  I have spoken with the patient and discussed todays results, in addition to providing specific details for the plan of care and counseling regarding the diagnosis and prognosis.   Their questions are answered at this time and they are agreeable with the plan of admission.    --------------------------------- ADDITIONAL PROVIDER NOTES ---------------------------------  Consultations:  Spoke with Dr. Kitty Garber. Discussed case. They will admit the patient. This patient's ED course included: a personal history and physicial examination, re-evaluation prior to disposition, multiple bedside re-evaluations, IV medications, cardiac monitoring and continuous pulse oximetry    This patient has remained hemodynamically stable during their ED course. Diagnosis:  1. Sepsis without acute organ dysfunction, due to unspecified organism ([address]    2. Pressure injury of sacral region, stage 4 ([address]    3. Chronic kidney disease, unspecified CKD stage        Disposition:  Patient's disposition: Admit to telemetry  Patient's condition is fair.           Edwige Mercado MD  Resident  04/15/22 1954

## 2022-04-17 LAB
BLOOD CULTURE, ROUTINE: NORMAL
CULTURE, BLOOD 2: ABNORMAL
ORGANISM: ABNORMAL

## 2022-05-26 NOTE — DISCHARGE SUMMARY
Admit Date: 4/11/2022 10:09 PM   Discharge Date: 4/14/2022    Patient Active Problem List   Diagnosis    Neurologic gait dysfunction    [facility] failure[facility] on chronic [facility]    Diabetes mellitus ([facility]    Hypertension    Arthritis    Kidney disease    Knee problem    Anemia due to stage 3 chronic kidney disease    Primary osteoarthritis of both knees    Acute on chronic renal insufficiency    Acute kidney injury superimposed on CKD ([facility]    Acute renal failure superimposed on chronic kidney disease, on chronic dialysis [facility]    Moderate protein-calorie malnutrition (HCC)    PERLA (acute kidney injury) ([facility]    Stage 5 chronic kidney disease on chronic dialysis ([facility]    Pressure injury of sacral region, stage 4 ([facility]    Pressure injury of right hip, stage 3 ([facility]    Syncope and collapse    Unresponsive episode    Left bundle branch block    Pure hypercholesterolemia    Moderate obesity    Sepsis ([facility]        Present on Admission:   Sepsis ([facility]          Medication List      CONTINUE taking these medications    acetaminophen 500 MG tablet  Commonly known as: TYLENOL     Auryxia 210 mg iron Tabs tablet  Generic drug: ferric citrate     Basaglar KwikPen 100 UNIT/ML injection pen  Generic drug: insulin glargine     bisacodyl 5 MG EC tablet  Commonly known as: DULCOLAX     * busPIRone 5 MG tablet  Commonly known as: BUSPAR     * busPIRone 10 MG tablet  Commonly known as: BUSPAR     docusate sodium 100 mg capsule  Commonly known as: COLACE     escitalopram 10 MG tablet  Commonly known as: LEXAPRO     gabapentin 300 MG capsule  Commonly known as: NEURONTIN     glucagon 1 MG injection     insulin lispro 100 UNIT/ML injection vial  Commonly known as: HUMALOG     lidocaine 4 % cream  Commonly known as: LMX     Linzess 72 MCG Caps capsule  Generic drug: linaCLOtide     magnesium hydroxide 400 MG/5ML suspension  Commonly known as: MILK OF MAGNESIA     metoprolol succinate 25 MG extended release tablet  Commonly known as: TOPROL XL  Take 1 tablet by mouth daily     * midodrine 5 MG tablet  Commonly known as: PROAMATINE     * midodrine 5 MG tablet  Commonly known as: PROAMATINE     neomycin-bacitracin-polymyxin 5-400-5000 ointment     oxyCODONE-acetaminophen 7.5-325 MG per tablet  Commonly known as: PERCOCET     polyethylene glycol 17 g packet  Commonly known as: GLYCOLAX     senna 8.6 MG tablet  Commonly known as: SENOKOT     Tradjenta 5 MG tablet  Generic drug: linagliptin     traZODone 50 MG tablet  Commonly known as: DESYREL     vitamin D 50 MCG (2000 UT) Tabs tablet  Commonly known as: CHOLECALCIFEROL  Take 1 tablet by mouth daily     Zofran ODT 4 MG disintegrating tablet  Generic drug: ondansetron         * This list has 4 medication(s) that are the same as other medications prescribed for you. Read the directions carefully, and ask your doctor or other care provider to review them with you. STOP taking these medications    cefTRIAXone 1 g injection  Commonly known as: ROCEPHIN     Ventolin  (90 Base) MCG/ACT inhaler  Generic drug: albuterol sulfate HFA        ASK your doctor about these medications    cephALEXin 500 MG capsule  Commonly known as: KEFLEX  Take 1 capsule by mouth nightly for 10 days  Ask about: Should I take this medication? Where to Get Your Medications      Information about where to get these medications is not yet available    Ask your nurse or doctor about these medications  · cephALEXin 500 MG capsule          Hospital Course/Procedures:66year-old with chronic kidney disease on dialysis and chronic large sacral decubiti was admitted on 4/11/2022 due to fever and chills and possible sepsis. ID was consult did and placed the patient on IV cefazolin. Patient continued her usual hemodialysis. The patient remained stable.   Infectious disease switched her to by mouth cephalexin and patient was discharged back to extended care facility in stable condition on 4/14/2022.     Consultants Following:factious disease and nephrology    Disposition:discharge to extended care facility    Follow-up:she'll be followed by the facility physician      Alma Rosa Nieves MD  5/26/2022  1:48 PM

## 2022-07-08 ENCOUNTER — APPOINTMENT (OUTPATIENT)
Dept: CT IMAGING | Age: 72
DRG: 640 | End: 2022-07-08
Payer: COMMERCIAL

## 2022-07-08 ENCOUNTER — APPOINTMENT (OUTPATIENT)
Dept: GENERAL RADIOLOGY | Age: 72
DRG: 640 | End: 2022-07-08
Payer: COMMERCIAL

## 2022-07-08 ENCOUNTER — HOSPITAL ENCOUNTER (INPATIENT)
Age: 72
LOS: 5 days | Discharge: NURSING, REHAB OR LONG-TERM CARE | DRG: 640 | End: 2022-07-14
Attending: EMERGENCY MEDICINE | Admitting: FAMILY MEDICINE
Payer: COMMERCIAL

## 2022-07-08 DIAGNOSIS — F41.1 ANXIETY STATE: ICD-10-CM

## 2022-07-08 DIAGNOSIS — E87.5 HYPERKALEMIA: Primary | ICD-10-CM

## 2022-07-08 DIAGNOSIS — R53.83 OTHER FATIGUE: ICD-10-CM

## 2022-07-08 DIAGNOSIS — R62.7 FAILURE TO THRIVE IN ADULT: ICD-10-CM

## 2022-07-08 DIAGNOSIS — M17.0 PRIMARY OSTEOARTHRITIS OF BOTH KNEES: ICD-10-CM

## 2022-07-08 LAB
ANION GAP SERPL CALCULATED.3IONS-SCNC: 17 MMOL/L (ref 7–16)
BACTERIA: ABNORMAL /HPF
BASOPHILS ABSOLUTE: 0.04 E9/L (ref 0–0.2)
BASOPHILS RELATIVE PERCENT: 0.4 % (ref 0–2)
BILIRUBIN URINE: NEGATIVE
BLOOD, URINE: ABNORMAL
BUN BLDV-MCNC: 40 MG/DL (ref 6–23)
CALCIUM SERPL-MCNC: 9.8 MG/DL (ref 8.6–10.2)
CHLORIDE BLD-SCNC: 97 MMOL/L (ref 98–107)
CLARITY: ABNORMAL
CO2: 26 MMOL/L (ref 22–29)
COLOR: YELLOW
CREAT SERPL-MCNC: 4.5 MG/DL (ref 0.5–1)
EOSINOPHILS ABSOLUTE: 0.08 E9/L (ref 0.05–0.5)
EOSINOPHILS RELATIVE PERCENT: 0.9 % (ref 0–6)
EPITHELIAL CELLS, UA: ABNORMAL /HPF
GFR AFRICAN AMERICAN: 12
GFR NON-AFRICAN AMERICAN: 10 ML/MIN/1.73
GLUCOSE BLD-MCNC: 180 MG/DL (ref 74–99)
GLUCOSE URINE: 100 MG/DL
HCT VFR BLD CALC: 29.7 % (ref 34–48)
HEMOGLOBIN: 9.7 G/DL (ref 11.5–15.5)
IMMATURE GRANULOCYTES #: 0.03 E9/L
IMMATURE GRANULOCYTES %: 0.3 % (ref 0–5)
KETONES, URINE: NEGATIVE MG/DL
LACTIC ACID: 2 MMOL/L (ref 0.5–2.2)
LEUKOCYTE ESTERASE, URINE: ABNORMAL
LIPASE: 13 U/L (ref 13–60)
LYMPHOCYTES ABSOLUTE: 0.72 E9/L (ref 1.5–4)
LYMPHOCYTES RELATIVE PERCENT: 8 % (ref 20–42)
MCH RBC QN AUTO: 33.8 PG (ref 26–35)
MCHC RBC AUTO-ENTMCNC: 32.7 % (ref 32–34.5)
MCV RBC AUTO: 103.5 FL (ref 80–99.9)
MONOCYTES ABSOLUTE: 0.75 E9/L (ref 0.1–0.95)
MONOCYTES RELATIVE PERCENT: 8.3 % (ref 2–12)
NEUTROPHILS ABSOLUTE: 7.42 E9/L (ref 1.8–7.3)
NEUTROPHILS RELATIVE PERCENT: 82.1 % (ref 43–80)
NITRITE, URINE: NEGATIVE
PDW BLD-RTO: 14.9 FL (ref 11.5–15)
PH UA: 8.5 (ref 5–9)
PLATELET # BLD: 215 E9/L (ref 130–450)
PMV BLD AUTO: 9.9 FL (ref 7–12)
POTASSIUM REFLEX MAGNESIUM: 6.4 MMOL/L (ref 3.5–5)
PROTEIN UA: 100 MG/DL
RBC # BLD: 2.87 E12/L (ref 3.5–5.5)
RBC UA: >20 /HPF (ref 0–2)
SARS-COV-2, NAAT: NOT DETECTED
SODIUM BLD-SCNC: 140 MMOL/L (ref 132–146)
SPECIFIC GRAVITY UA: 1.01 (ref 1–1.03)
UROBILINOGEN, URINE: 0.2 E.U./DL
WBC # BLD: 9 E9/L (ref 4.5–11.5)
WBC UA: >20 /HPF (ref 0–5)

## 2022-07-08 PROCEDURE — 85025 COMPLETE CBC W/AUTO DIFF WBC: CPT

## 2022-07-08 PROCEDURE — 2580000003 HC RX 258: Performed by: EMERGENCY MEDICINE

## 2022-07-08 PROCEDURE — 96374 THER/PROPH/DIAG INJ IV PUSH: CPT

## 2022-07-08 PROCEDURE — 81001 URINALYSIS AUTO W/SCOPE: CPT

## 2022-07-08 PROCEDURE — 93005 ELECTROCARDIOGRAM TRACING: CPT | Performed by: EMERGENCY MEDICINE

## 2022-07-08 PROCEDURE — 2500000003 HC RX 250 WO HCPCS: Performed by: EMERGENCY MEDICINE

## 2022-07-08 PROCEDURE — G0378 HOSPITAL OBSERVATION PER HR: HCPCS

## 2022-07-08 PROCEDURE — 87635 SARS-COV-2 COVID-19 AMP PRB: CPT

## 2022-07-08 PROCEDURE — 80048 BASIC METABOLIC PNL TOTAL CA: CPT

## 2022-07-08 PROCEDURE — 83605 ASSAY OF LACTIC ACID: CPT

## 2022-07-08 PROCEDURE — 96375 TX/PRO/DX INJ NEW DRUG ADDON: CPT

## 2022-07-08 PROCEDURE — 5A1D70Z PERFORMANCE OF URINARY FILTRATION, INTERMITTENT, LESS THAN 6 HOURS PER DAY: ICD-10-PCS | Performed by: FAMILY MEDICINE

## 2022-07-08 PROCEDURE — 99285 EMERGENCY DEPT VISIT HI MDM: CPT

## 2022-07-08 PROCEDURE — 83690 ASSAY OF LIPASE: CPT

## 2022-07-08 PROCEDURE — 6360000002 HC RX W HCPCS: Performed by: EMERGENCY MEDICINE

## 2022-07-08 PROCEDURE — 96361 HYDRATE IV INFUSION ADD-ON: CPT

## 2022-07-08 RX ORDER — CALCIUM GLUCONATE 94 MG/ML
1000 INJECTION, SOLUTION INTRAVENOUS ONCE
Status: COMPLETED | OUTPATIENT
Start: 2022-07-08 | End: 2022-07-08

## 2022-07-08 RX ORDER — 0.9 % SODIUM CHLORIDE 0.9 %
500 INTRAVENOUS SOLUTION INTRAVENOUS ONCE
Status: COMPLETED | OUTPATIENT
Start: 2022-07-08 | End: 2022-07-08

## 2022-07-08 RX ADMIN — SODIUM BICARBONATE 50 MEQ: 84 INJECTION, SOLUTION INTRAVENOUS at 22:50

## 2022-07-08 RX ADMIN — SODIUM CHLORIDE 500 ML: 9 INJECTION, SOLUTION INTRAVENOUS at 21:49

## 2022-07-08 RX ADMIN — CALCIUM GLUCONATE 1000 MG: 98 INJECTION, SOLUTION INTRAVENOUS at 22:49

## 2022-07-08 NOTE — LETTER
[place] Department Medicaid  CERTIFICATION OF NECESSITY  FOR NON-EMERGENCY TRANSPORTATION   BY GROUND AMBULANCE      Individual Information   1. Name: Renee Barnett 2. [place] Medicaid Billing Number:    3. Address: [address] [place] [address]      Transportation Provider Information   4. Provider Name:    5. [place] Medicaid Provider Number:  National Provider Identifier (NPI):      Certification  7. Criteria:  During transport, this individual requires:  [x] Medical treatment or continuous     supervision by an EMT. [] The administration or regulation of oxygen by another person. [] Supervised protective restraint. 8. Period Beginning Date:    5. Length  [x] Not more than  day(s)  [] One Year     Additional Information Relevant to Certification   10. Comments or Explanations, If Necessary or Appropriate   Failure to thrive, numerous wounds, ESRD, fluid overload , patient exhibits decreased strength, balance, coordination impairing functional mobility. Certifying Practitioner Information   11. Name of Practitioner: Dr. Milton Tyson MD   12. [place] Medicaid Provider Number, If Applicable:  [address] Provider Identifier (NPI):      Signature Information   14. Date of Signature: 7/12/2022   15. Name of Person Signing: Electronically signed by Remedios Mitchell RN on 7/12/2022 at 3:55 PM     16. Signature and Professional Designation: Electronically signed by Remedios Mitchell RN on 7/12/2022 at 3:55 PM  Discharge Planner     [facility] [address]  Rev. 7/2015          Eduin Alfred Encounter Date/Time: 7/8/2022 2030    Hospital Account: [de-identified]    MRN: [id number]    Patient: Renee Barnett    Contact Serial #: [id number]      ENCOUNTER          Patient Class: I Private Enc?   No Unit RM BD: [id number]   Hospital Service: MED   Encounter DX: Failure to thrive in ronna*   ADM Provider: Milton Tyson MD   Procedure:     ATT Provider: Milton Tyson MD   REF Provider:        Admission DX: Failure to thrive in adult, Acute renal failure superimposed on chronic kidney disease, on chronic dialysis, unspecified acute renal failure type ([facility] and DX codes: R62.7, N17.9, N18.9, Z99.2      PATIENT                 Name: Akila Gomez : 1950 (66 yrs)   Address: [address] [place] [address] Sex: Female   [facility] [id number]         Marital Status:    Employer: RETIRED         Denominational: Tenriism   Primary Care Provider: Mark Casanova MD         Primary Phone: [phone]   EMERGENCY CONTACT   Contact Name Legal Guardian? Relationship to Patient Home Phone Work Phone   1. Johnson Mcguire  2. Inga Mcguire No  No Child  Child [phone]              GUARANTOR            Guarantor: Akila Gomez     : 1950   Address: [address] [place] [address] Sex: Female   [facility] [address]     Relation to Patient: Self       Home Phone: [phone]   Guarantor ID: [id number]       Work Phone:     Guarantor Employer: RETIRED         Status: RETIRED      COVERAGE        PRIMARY INSURANCE   Payor: [address] [place] [address] 62*   Payor Address: Armando Palacio  [place], [address] [place] [address],[address] [facility]       Group Number: [id number] Type: INDEMNITY   Subscriber Name: London Robison : 1950   Subscriber ID: [id number] Pat. Rel. to Sub: Self   SECONDARY INSURANCE   Payor:   Plan:     Payor Address:  ,           Group Number:   Insurance Type:     Subscriber Name:   Subscriber :     Subscriber ID:   Pat.  Rel. to Sub:

## 2022-07-09 LAB
EKG ATRIAL RATE: 93 BPM
EKG Q-T INTERVAL: 470 MS
EKG QRS DURATION: 138 MS
EKG QTC CALCULATION (BAZETT): 587 MS
EKG R AXIS: -35 DEGREES
EKG T AXIS: 107 DEGREES
EKG VENTRICULAR RATE: 94 BPM
METER GLUCOSE: 120 MG/DL (ref 74–99)
METER GLUCOSE: 122 MG/DL (ref 74–99)
METER GLUCOSE: 150 MG/DL (ref 74–99)
METER GLUCOSE: 165 MG/DL (ref 74–99)

## 2022-07-09 PROCEDURE — 6370000000 HC RX 637 (ALT 250 FOR IP): Performed by: FAMILY MEDICINE

## 2022-07-09 PROCEDURE — 2060000000 HC ICU INTERMEDIATE R&B

## 2022-07-09 PROCEDURE — G0378 HOSPITAL OBSERVATION PER HR: HCPCS

## 2022-07-09 PROCEDURE — 90935 HEMODIALYSIS ONE EVALUATION: CPT

## 2022-07-09 PROCEDURE — 96372 THER/PROPH/DIAG INJ SC/IM: CPT

## 2022-07-09 PROCEDURE — 2580000003 HC RX 258: Performed by: FAMILY MEDICINE

## 2022-07-09 PROCEDURE — 82962 GLUCOSE BLOOD TEST: CPT

## 2022-07-09 PROCEDURE — 6360000002 HC RX W HCPCS: Performed by: FAMILY MEDICINE

## 2022-07-09 RX ORDER — ACETAMINOPHEN 650 MG/1
650 SUPPOSITORY RECTAL EVERY 6 HOURS PRN
Status: DISCONTINUED | OUTPATIENT
Start: 2022-07-09 | End: 2022-07-14 | Stop reason: HOSPADM

## 2022-07-09 RX ORDER — SENNA PLUS 8.6 MG/1
2 TABLET ORAL 2 TIMES DAILY
Status: DISCONTINUED | OUTPATIENT
Start: 2022-07-09 | End: 2022-07-14 | Stop reason: HOSPADM

## 2022-07-09 RX ORDER — SODIUM CHLORIDE 0.9 % (FLUSH) 0.9 %
5-40 SYRINGE (ML) INJECTION PRN
Status: DISCONTINUED | OUTPATIENT
Start: 2022-07-09 | End: 2022-07-14 | Stop reason: HOSPADM

## 2022-07-09 RX ORDER — OXYCODONE AND ACETAMINOPHEN 7.5; 325 MG/1; MG/1
1 TABLET ORAL EVERY 8 HOURS PRN
Status: DISCONTINUED | OUTPATIENT
Start: 2022-07-09 | End: 2022-07-09

## 2022-07-09 RX ORDER — DOCUSATE SODIUM 100 MG/1
100 CAPSULE, LIQUID FILLED ORAL 2 TIMES DAILY
Status: DISCONTINUED | OUTPATIENT
Start: 2022-07-09 | End: 2022-07-14 | Stop reason: HOSPADM

## 2022-07-09 RX ORDER — OXYCODONE HYDROCHLORIDE AND ACETAMINOPHEN 5; 325 MG/1; MG/1
1 TABLET ORAL EVERY 8 HOURS PRN
Status: DISCONTINUED | OUTPATIENT
Start: 2022-07-09 | End: 2022-07-14 | Stop reason: HOSPADM

## 2022-07-09 RX ORDER — MIDODRINE HYDROCHLORIDE 5 MG/1
15 TABLET ORAL
Status: DISCONTINUED | OUTPATIENT
Start: 2022-07-09 | End: 2022-07-14 | Stop reason: HOSPADM

## 2022-07-09 RX ORDER — DEXTROSE MONOHYDRATE 50 MG/ML
100 INJECTION, SOLUTION INTRAVENOUS PRN
Status: DISCONTINUED | OUTPATIENT
Start: 2022-07-09 | End: 2022-07-14 | Stop reason: HOSPADM

## 2022-07-09 RX ORDER — OXYCODONE HYDROCHLORIDE 5 MG/1
2.5 TABLET ORAL EVERY 8 HOURS PRN
Status: DISCONTINUED | OUTPATIENT
Start: 2022-07-09 | End: 2022-07-14 | Stop reason: HOSPADM

## 2022-07-09 RX ORDER — ESCITALOPRAM OXALATE 10 MG/1
10 TABLET ORAL DAILY
Status: DISCONTINUED | OUTPATIENT
Start: 2022-07-09 | End: 2022-07-14 | Stop reason: HOSPADM

## 2022-07-09 RX ORDER — SODIUM CHLORIDE 9 MG/ML
INJECTION, SOLUTION INTRAVENOUS PRN
Status: DISCONTINUED | OUTPATIENT
Start: 2022-07-09 | End: 2022-07-14 | Stop reason: HOSPADM

## 2022-07-09 RX ORDER — BUSPIRONE HYDROCHLORIDE 5 MG/1
5 TABLET ORAL NIGHTLY
Status: DISCONTINUED | OUTPATIENT
Start: 2022-07-09 | End: 2022-07-14 | Stop reason: HOSPADM

## 2022-07-09 RX ORDER — ALOGLIPTIN 6.25 MG/1
6.25 TABLET, FILM COATED ORAL DAILY
Status: DISCONTINUED | OUTPATIENT
Start: 2022-07-09 | End: 2022-07-14 | Stop reason: HOSPADM

## 2022-07-09 RX ORDER — ONDANSETRON 4 MG/1
4 TABLET, ORALLY DISINTEGRATING ORAL EVERY 4 HOURS PRN
Status: DISCONTINUED | OUTPATIENT
Start: 2022-07-09 | End: 2022-07-09

## 2022-07-09 RX ORDER — POLYETHYLENE GLYCOL 3350 17 G/17G
17 POWDER, FOR SOLUTION ORAL DAILY
Status: DISCONTINUED | OUTPATIENT
Start: 2022-07-09 | End: 2022-07-14 | Stop reason: HOSPADM

## 2022-07-09 RX ORDER — PROCHLORPERAZINE MALEATE 5 MG/1
5 TABLET ORAL EVERY 6 HOURS PRN
Status: DISCONTINUED | OUTPATIENT
Start: 2022-07-09 | End: 2022-07-14 | Stop reason: HOSPADM

## 2022-07-09 RX ORDER — BUSPIRONE HYDROCHLORIDE 10 MG/1
10 TABLET ORAL 2 TIMES DAILY
Status: DISCONTINUED | OUTPATIENT
Start: 2022-07-09 | End: 2022-07-14 | Stop reason: HOSPADM

## 2022-07-09 RX ORDER — GABAPENTIN 300 MG/1
300 CAPSULE ORAL 3 TIMES DAILY
Status: DISCONTINUED | OUTPATIENT
Start: 2022-07-09 | End: 2022-07-11

## 2022-07-09 RX ORDER — METOPROLOL SUCCINATE 25 MG/1
25 TABLET, EXTENDED RELEASE ORAL DAILY
Status: DISCONTINUED | OUTPATIENT
Start: 2022-07-09 | End: 2022-07-14 | Stop reason: HOSPADM

## 2022-07-09 RX ORDER — CHOLECALCIFEROL (VITAMIN D3) 50 MCG
2000 TABLET ORAL DAILY
Status: DISCONTINUED | OUTPATIENT
Start: 2022-07-09 | End: 2022-07-14 | Stop reason: HOSPADM

## 2022-07-09 RX ORDER — INSULIN GLARGINE 100 [IU]/ML
10 INJECTION, SOLUTION SUBCUTANEOUS NIGHTLY
Status: DISCONTINUED | OUTPATIENT
Start: 2022-07-09 | End: 2022-07-12

## 2022-07-09 RX ORDER — TRAZODONE HYDROCHLORIDE 50 MG/1
25 TABLET ORAL NIGHTLY PRN
Status: DISCONTINUED | OUTPATIENT
Start: 2022-07-09 | End: 2022-07-14 | Stop reason: HOSPADM

## 2022-07-09 RX ORDER — ACETAMINOPHEN 325 MG/1
650 TABLET ORAL EVERY 6 HOURS PRN
Status: DISCONTINUED | OUTPATIENT
Start: 2022-07-09 | End: 2022-07-14 | Stop reason: HOSPADM

## 2022-07-09 RX ORDER — INSULIN LISPRO 100 [IU]/ML
10 INJECTION, SOLUTION INTRAVENOUS; SUBCUTANEOUS
Status: DISCONTINUED | OUTPATIENT
Start: 2022-07-09 | End: 2022-07-12

## 2022-07-09 RX ORDER — HEPARIN SODIUM 10000 [USP'U]/ML
5000 INJECTION, SOLUTION INTRAVENOUS; SUBCUTANEOUS EVERY 8 HOURS SCHEDULED
Status: DISCONTINUED | OUTPATIENT
Start: 2022-07-09 | End: 2022-07-14 | Stop reason: HOSPADM

## 2022-07-09 RX ORDER — SODIUM CHLORIDE 0.9 % (FLUSH) 0.9 %
5-40 SYRINGE (ML) INJECTION EVERY 12 HOURS SCHEDULED
Status: DISCONTINUED | OUTPATIENT
Start: 2022-07-09 | End: 2022-07-14 | Stop reason: HOSPADM

## 2022-07-09 RX ADMIN — HEPARIN SODIUM 5000 UNITS: 10000 INJECTION INTRAVENOUS; SUBCUTANEOUS at 06:36

## 2022-07-09 RX ADMIN — ESCITALOPRAM OXALATE 10 MG: 10 TABLET ORAL at 08:47

## 2022-07-09 RX ADMIN — DOCUSATE SODIUM 100 MG: 100 CAPSULE, LIQUID FILLED ORAL at 08:48

## 2022-07-09 RX ADMIN — BUSPIRONE HYDROCHLORIDE 10 MG: 10 TABLET ORAL at 08:48

## 2022-07-09 RX ADMIN — HEPARIN SODIUM 5000 UNITS: 10000 INJECTION INTRAVENOUS; SUBCUTANEOUS at 14:29

## 2022-07-09 RX ADMIN — GABAPENTIN 300 MG: 300 CAPSULE ORAL at 22:26

## 2022-07-09 RX ADMIN — GABAPENTIN 300 MG: 300 CAPSULE ORAL at 08:47

## 2022-07-09 RX ADMIN — POLYETHYLENE GLYCOL 3350 17 G: 17 POWDER, FOR SOLUTION ORAL at 08:47

## 2022-07-09 RX ADMIN — GABAPENTIN 300 MG: 300 CAPSULE ORAL at 14:29

## 2022-07-09 RX ADMIN — BUSPIRONE HYDROCHLORIDE 5 MG: 5 TABLET ORAL at 22:25

## 2022-07-09 RX ADMIN — ACETAMINOPHEN 650 MG: 325 TABLET ORAL at 14:33

## 2022-07-09 RX ADMIN — METOPROLOL SUCCINATE 25 MG: 25 TABLET, EXTENDED RELEASE ORAL at 08:48

## 2022-07-09 RX ADMIN — BUSPIRONE HYDROCHLORIDE 10 MG: 10 TABLET ORAL at 16:43

## 2022-07-09 RX ADMIN — SENNOSIDES 17.2 MG: 8.6 TABLET, FILM COATED ORAL at 08:47

## 2022-07-09 RX ADMIN — ALOGLIPTIN 6.25 MG: 6.25 TABLET, FILM COATED ORAL at 08:47

## 2022-07-09 RX ADMIN — Medication 10 ML: at 08:49

## 2022-07-09 RX ADMIN — BISACODYL 10 MG: 5 TABLET, COATED ORAL at 08:47

## 2022-07-09 RX ADMIN — Medication 2000 UNITS: at 08:50

## 2022-07-09 RX ADMIN — MAGNESIUM CITRATE 296 ML: 1.75 LIQUID ORAL at 11:07

## 2022-07-09 RX ADMIN — Medication 10 ML: at 22:27

## 2022-07-09 ASSESSMENT — PAIN SCALES - GENERAL
PAINLEVEL_OUTOF10: 3
PAINLEVEL_OUTOF10: 4

## 2022-07-09 ASSESSMENT — ENCOUNTER SYMPTOMS
BACK PAIN: 0
SINUS PAIN: 0
EYE PAIN: 0
SORE THROAT: 0
VOMITING: 0
DIARRHEA: 0
SHORTNESS OF BREATH: 0
NAUSEA: 0
ABDOMINAL PAIN: 0

## 2022-07-09 NOTE — ED TRIAGE NOTES
Pt presents today after her electric stair climber \"went out. \" Pt states she was discharged from the nursing home on Monday and has not been walking at home, instead using her wheelchair. She has been declining since Monday. Pt states she has had a pressure ulcer on her coccyx for the last two years and when her electric stair climber went out, she stated she would be in too much pain to sit in her wheelchair all night long. Pt would like placement at a different nursing home.

## 2022-07-09 NOTE — CONSULTS
Nephrology Consult Note  [facility]     Reason for Consult:  ESRD  Requesting Physician:  Dr Alana Combs   Date of Service: 7/9/2022   Chief Complaint:  Weakness   History Obtained From:  Patient, medical record      History of Present Ilness:      Ms Nathalie Spencer is a 66year old female who we are consulted on for evaluation and management of ESRD    She presented to the hospital on 7-8. She left her nursing home on 7-5. She has been weak since that time. No chest pain, shortness of breath, abdominal pain, n/v. She does have constipation    She has ESRD and receives HD MWF via [facility]. She missed treatment on 7-6 but did receive a full treatment on 7-8. As her K was 6.4 on admission, she received emergent dialysis overnight. Past Medical History:        Diagnosis Date    Anemia in chronic kidney disease     Anxiety and depression     Arthritis     Chronic pain syndrome     COVID-19 05/2020    COVID-19     Diabetes mellitus ([facility]     Dysphagia, oral phase     GERD (gastroesophageal reflux disease)     Hemodialysis patient ([facility]     M-W-F    Hyperkalemia     Hypertension     Hypertensive kidney disease with stage 4 chronic kidney disease (HCC)     Insomnia     Kidney stones     MDD (major depressive disorder)     Moderate protein-calorie malnutrition (HCC)     Morbid obesity (HCC)     Muscle weakness (generalized)     Obesity     Pressure ulcer of sacral region     Sacral pressure ulcer 08/03/2021    stage 4    Unspecified osteoarthritis, unspecified site     Weakness generalized        Past Surgical History:        Procedure Laterality Date    ABDOMEN SURGERY N/A 5/4/2020    SACRAL WOUND DEBRIDEMENT CALL   WITH TIME AVAIL AM performed by Frances Miranda MD at [address]  x3    CHOLECYSTECTOMY  3/31/16    Laparoscopic-Dr. Heather Zamora    CYSTOSCOPY  2011     for kidney stones    DIALYSIS FISTULA CREATION Left 8/5/2021    INSERTION FISTULA LEFT ARM performed by Zane Paulino MD at [facility] Right 11/18/2021    REVISION AV FISTULA LEFT ARM performed by Zane Paulino MD at [address]  2009     right     UPPER GASTROINTESTINAL ENDOSCOPY  2.18.15    Dr. Joshua Riley Findings: Mild Gastrits and Duodenitis, 2cm Hiatal Hernia    UPPER GASTROINTESTINAL ENDOSCOPY N/A 5/8/2020    EGD CONTROL HEMORRHAGE performed by Maynor Harris MD at [address] [place] [address] N/A 5/22/2020    EGD BEDSIDE performed by Mago Dooley MD at [facility] [address] N/A 5/6/2021    INSERTION TUNNELED DIALYSIS CATHETER performed by Zane Paulino MD at [facility] OR       Current Medications:    Current Facility-Administered Medications: bisacodyl (DULCOLAX) EC tablet 10 mg, 10 mg, Oral, Daily PRN  busPIRone (BUSPAR) tablet 10 mg, 10 mg, Oral, BID  busPIRone (BUSPAR) tablet 5 mg, 5 mg, Oral, Nightly  docusate sodium (COLACE) capsule 100 mg, 100 mg, Oral, BID  escitalopram (LEXAPRO) tablet 10 mg, 10 mg, Oral, Daily  ferric citrate (AURYXIA) tablet 420 mg, 420 mg, Oral, TID WC  gabapentin (NEURONTIN) capsule 300 mg, 300 mg, Oral, TID  insulin glargine (LANTUS) injection vial 10 Units, 10 Units, SubCUTAneous, Nightly  insulin lispro (HUMALOG) injection vial 10 Units, 10 Units, SubCUTAneous, TID AC  linaCLOtide (LINZESS) capsule 72 mcg, 72 mcg, Oral, QAM AC  magnesium hydroxide (MILK OF MAGNESIA) 400 MG/5ML suspension 30 mL, 30 mL, Oral, Daily PRN  metoprolol succinate (TOPROL XL) extended release tablet 25 mg, 25 mg, Oral, Daily  midodrine (PROAMATINE) tablet 15 mg, 15 mg, Oral, Once per day on Mon Wed Fri  polyethylene glycol (GLYCOLAX) packet 17 g, 17 g, Oral, Daily  senna (SENOKOT) tablet 17.2 mg, 2 tablet, Oral, BID  traZODone (DESYREL) tablet 25 mg, 25 mg, Oral, Nightly PRN  vitamin D (CHOLECALCIFEROL) tablet 2,000 Units, 2,000 Units, Oral, Daily  sodium chloride flush 0.9 % injection 5-40 mL, 5-40 mL, IntraVENous, 2 times per day  sodium chloride flush 0.9 % injection 5-40 mL, 5-40 mL, IntraVENous, PRN  0.9 % sodium chloride infusion, , IntraVENous, PRN  heparin (porcine) injection 5,000 Units, 5,000 Units, SubCUTAneous, 3 times per day  acetaminophen (TYLENOL) tablet 650 mg, 650 mg, Oral, Q6H PRN **OR** acetaminophen (TYLENOL) suppository 650 mg, 650 mg, Rectal, Q6H PRN  glucose chewable tablet 16 g, 4 tablet, Oral, PRN  dextrose bolus 10% 125 mL, 125 mL, IntraVENous, PRN **OR** dextrose bolus 10% 250 mL, 250 mL, IntraVENous, PRN  glucagon (rDNA) injection 1 mg, 1 mg, IntraMUSCular, PRN  dextrose 5 % solution, 100 mL/hr, IntraVENous, PRN  alogliptin (NESINA) tablet 6.25 mg, 6.25 mg, Oral, Daily  oxyCODONE-acetaminophen (PERCOCET) 5-325 MG per tablet 1 tablet, 1 tablet, Oral, Q8H PRN **AND** oxyCODONE (ROXICODONE) immediate release tablet 2.5 mg, 2.5 mg, Oral, Q8H PRN  prochlorperazine (COMPAZINE) tablet 5 mg, 5 mg, Oral, Q6H PRN    Allergies:  Patient has no known allergies. Social History:    No tobacco    Family History:   No kidney disease     Review of Systems:   Pertinent positives stated above in HPI. All other systems were reviewed and were negative.     Physical exam:   Constitutional:  VITALS:  /62   Pulse 100   Temp 97.8 °F (36.6 °C) (Infrared)   Resp 18   Ht 5' 1\" (1.549 m)   Wt 205 lb 8 oz (93.2 kg)   SpO2 99%   BMI 38.83 kg/m²     Gen: alert, awake, no acute distress  Eyes: anicteric sclerae, eomi  HEENT: atraumatic/normocephalic  Neck: supple, no JVD  Lungs: clear to ascultation bilaterally, equal lung expansion  CV: RRR, no murmurs,  Abdomen: soft, nontender, nondistended, normoactive bowel sounds  Extremitiy: no edema  : no CVA tenderness  Skin: no rash, tugor wnl  Neuro: no focal deficits, AOX3  Psych: cooperative and appropriate      Data:       Last 3 CMP:    Recent Labs     07/08/22  2142      K 6.4*   CL 97*   CO2 26   BUN 40*   CREATININE 4.5*   GLUCOSE 180* CALCIUM 9.8         Last 3 Glucose:     Recent Labs     07/08/22  2142   GLUCOSE 180*         Last 3 POC Glucose:     No results for input(s): POCGLU in the last 72 hours. Last 3 CK, CKMB, Troponin  No results for input(s): CKTOTAL, CKMB, TROPONINI in the last 72 hours. Last 3 CBC:  Recent Labs     07/08/22  2142   WBC 9.0   RBC 2.87*   HGB 9.7*   HCT 29.7*   .5*   MCH 33.8   MCHC 32.7   RDW 14.9      MPV 9.9       Last 3 Hepatic Function Panel:  No results for input(s): ALKPHOS, ALT, AST, PROT, BILITOT, BILIDIR, LABALBU in the last 72 hours. Albumin:  No results for input(s): LABALBU in the last 72 hours. Calcium:  Recent Labs     07/08/22  2142   CALCIUM 9.8       Ionized Calcium:  No results for input(s): IONCA in the last 72 hours. Magnesium:    No results for input(s): MG in the last 72 hours. ABGs:  No results for input(s): PHART, PO2ART, NLK8XWY, KDW9AXU, BEART, [id number] in the last 72 hours. Lactic Acid:  Recent Labs     07/08/22  2142   LACTA 2.0       Last 3 Amylase:  No results for input(s): AMYLASE in the last 72 hours. Last 3 Lipase:  Recent Labs     07/08/22  2142   LIPASE 13       Last 3 BNP:  No results for input(s): BNP in the last 72 hours.             Assessment/Plan      1 ESRD  HD MWF via [facility]  Missed outpatient HD on 7/6 but received outpatient treatment on 7/8  Received emergent HD on admission due to hyperkalemia  Will follow daily for dialysis needs  Next treatment scheduled for 7/11    2 HTN with CKD V  Well controlled  She has had lower readings - on midodrine    3 Hyperkalemia  K 6.4 on admission  Should improve with dialysis    4 Anemia with CKD  No reported blood loss  Follow Hgb - close to goal for ESRD  Give ARUN as needed    5 Hyperphosphatemia  On binders chronically  Follow PO4 and adjust as needed      Of note, no labs from today at the time of this note    Thank you for the opportunity to participate in the care of Ms Heidi Guido

## 2022-07-09 NOTE — FLOWSHEET NOTE
07/09/22 0303   Vital Signs   BP (!) 136/47   Temp 98.2 °F (36.8 °C)   Heart Rate 99   Resp 18   Weight   (er cart)   Pain Assessment   Pain Assessment None - Denies Pain   Post-Hemodialysis Assessment   Post-Treatment Procedures Blood returned; Access bleeding time < 10 minutes   Machine Disinfection Process Acid/Vinegar Clean;Heat Disinfect; Exterior Machine Disinfection   Rinseback Volume (ml) 300 ml   Blood Volume Processed (Liters) 48.3 l/min   Dialyzer Clearance Clear   Duration of Treatment (minutes) 180 minutes   Hemodialysis Intake (ml) 300 ml   Hemodialysis Output (ml) 1500 ml   NET Removed (ml) 1200   Tolerated Treatment Good   Patient Response to Treatment tolerated tx well, hemostasi achieved, VSS, stable at dc   Bilateral Breath Sounds Diminished

## 2022-07-09 NOTE — ED PROVIDER NOTES
Chief Complaint   Patient presents with    Fatigue       26-year-old female with past medical history of diabetes, hypertension, CKD on HD presenting today with worsening fatigue and anxiety state at home due to inability to ambulate on her own. With her fatigue she is missing her dialysis appointments. She was recently discharged from rehab a few days ago, since then she has been able to ambulate a very short distance while at home but has not been able to do her normal distance. Nothing has made it better or worse, not associated with swelling in her legs or recent falls. She says she is unable to go back home and is hoping to go to another rehab facility for further strength gaining. No other acute complaints. Review of Systems   Constitutional: Positive for fatigue. Negative for chills and fever. HENT: Negative for ear pain, sinus pain and sore throat. Eyes: Negative for pain. Respiratory: Negative for shortness of breath. Cardiovascular: Negative for chest pain. Gastrointestinal: Negative for abdominal pain, diarrhea, nausea and vomiting. Genitourinary: Negative for flank pain and pelvic pain. Musculoskeletal: Negative for back pain and neck pain. Skin: Negative for rash. Neurological: Positive for weakness. Negative for seizures and headaches. Hematological: Negative for adenopathy. All other systems reviewed and are negative. Physical Exam  Vitals and nursing note reviewed. Constitutional:       General: She is not in acute distress. Appearance: She is well-developed. She is not toxic-appearing. HENT:      Head: Normocephalic and atraumatic. Right Ear: External ear normal.      Left Ear: External ear normal.      Nose: Nose normal.      Mouth/Throat:      Mouth: Mucous membranes are moist.      Pharynx: Oropharynx is clear. Eyes:      Pupils: Pupils are equal, round, and reactive to light.    Cardiovascular:      Rate and Rhythm: Normal rate and regular rhythm. Heart sounds: Normal heart sounds. No murmur heard. Pulmonary:      Effort: No respiratory distress. Breath sounds: Normal breath sounds. No wheezing. Abdominal:      General: Bowel sounds are normal.      Palpations: Abdomen is soft. Tenderness: There is no abdominal tenderness. There is no guarding or rebound. Musculoskeletal:         General: No swelling. Cervical back: Normal range of motion and neck supple. Skin:     General: Skin is warm and dry. Neurological:      Mental Status: She is alert and oriented to person, place, and time. Cranial Nerves: No cranial nerve deficit. Procedures     EKG: This EKG is signed and interpreted by me. Rate: 94  Rhythm: Sinus rhythm with left bundle branch block  Interpretation: no acute changes, no acute ST elevations, intervals are relatively normal, leftward axis deviation,   Comparison: stable as compared to patient's most recent EKG      MDM  Number of Diagnoses or Management Options  Diagnosis management comments: 76-year-old female past medical history of diabetes, hypertension, CKD on HD presented with worsening fatigue and anxiety state at home due to inability to ambulate on her own. On arrival to emergency department she was hemodynamically stable. Lab work demonstrated worsening kidney function and hyperkalemia. Discussed the case with nephrology and they want to take the patient for emergent dialysis, Dr. Pili Hernandez admitted the patient for further placement and failure to thrive. Patient understanding of the plan. ED Course as of 07/09/22 1640   Fri Jul 08, 2022   2202 Patient refusing knee x-rays in addition to the Castelan catheter. [MM]   0123 Reviewed patient's lab patient hyperkalemia unlikely she is going to dialysis and at least 2 days. IV fluids were given. Spoke with dialysis nurse who will take patient up to dialysis in approximately 30 minutes.   She is already spoken with patient's nephrologist Dr. Ben Capellan. Plan for admission once patient work-up is completed but she will be admitted for hyperkalemia need for nursing home placement. [CF]   9026 Spoke with Dr. Elizabeth Cormier, he will admit the patient for management of her hyperkalemia as well as further placement. [MM]      ED Course User Index  [CF] Jean Pierre Delgadillo DO  [MM] Almon Starch, DO        ED Course as of 07/09/22 1640 Fri Jul 08, 2022   2202 Patient refusing knee x-rays in addition to the Castelan catheter. [MM]   7190 Reviewed patient's lab patient hyperkalemia unlikely she is going to dialysis and at least 2 days. IV fluids were given. Spoke with dialysis nurse who will take patient up to dialysis in approximately 30 minutes. She is already spoken with patient's nephrologist Dr. Ben Capellan. Plan for admission once patient work-up is completed but she will be admitted for hyperkalemia need for nursing home placement. [CF]   2226 Spoke with Dr. Elizabeth Cormier, he will admit the patient for management of her hyperkalemia as well as further placement. [MM]      ED Course User Index  [CF] Jean Pierre Delgadillo DO  [MM] Almon Starch, DO       --------------------------------------------- PAST HISTORY ---------------------------------------------  Past Medical History:  has a past medical history of Anemia in chronic kidney disease, Anxiety and depression, Arthritis, Chronic pain syndrome, COVID-19, COVID-19, Diabetes mellitus ([address] Dysphagia, oral phase, GERD (gastroesophageal reflux disease), Hemodialysis patient ([address] Hyperkalemia, Hypertension, Hypertensive kidney disease with stage 4 chronic kidney disease ([address] Insomnia, Kidney stones, MDD (major depressive disorder), Moderate protein-calorie malnutrition ([address] Morbid obesity ([address] Muscle weakness (generalized), Obesity, Pressure ulcer of sacral region, Sacral pressure ulcer, Unspecified osteoarthritis, unspecified site, and Weakness generalized.     Past Surgical History:  has a past surgical history that includes Foot surgery ( ); Cystocopy ( );  section (x3); Upper gastrointestinal endoscopy (2.18.15); Cholecystectomy (3/31/16); Abdomen surgery (N/A, 2020); Upper gastrointestinal endoscopy (N/A, 2020); Upper gastrointestinal endoscopy (N/A, 2020); vascular surgery (N/A, 2021); Dialysis fistula creation (Left, 2021); and Dialysis fistula creation (Right, 2021). Social History:  reports that she quit smoking about 10 years ago. She has a 30.00 pack-year smoking history. She has never used smokeless tobacco. She reports that she does not drink alcohol and does not use drugs. Family History: family history includes Cancer in her sister. The patients home medications have been reviewed. Allergies: Patient has no known allergies.     -------------------------------------------------- RESULTS -------------------------------------------------    LABS:  Results for orders placed or performed during the hospital encounter of 22   COVID-19, Rapid    Specimen: Nasopharyngeal Swab   Result Value Ref Range    SARS-CoV-2, NAAT Not Detected Not Detected   CBC with Auto Differential   Result Value Ref Range    WBC 9.0 4.5 - 11.5 E9/L    RBC 2.87 (L) 3.50 - 5.50 E12/L    Hemoglobin 9.7 (L) 11.5 - 15.5 g/dL    Hematocrit 29.7 (L) 34.0 - 48.0 %    .5 (H) 80.0 - 99.9 fL    MCH 33.8 26.0 - 35.0 pg    MCHC 32.7 32.0 - 34.5 %    RDW 14.9 11.5 - 15.0 fL    Platelets 507 659 - 529 E9/L    MPV 9.9 7.0 - 12.0 fL    Neutrophils % 82.1 (H) 43.0 - 80.0 %    Immature Granulocytes % 0.3 0.0 - 5.0 %    Lymphocytes % 8.0 (L) 20.0 - 42.0 %    Monocytes % 8.3 2.0 - 12.0 %    Eosinophils % 0.9 0.0 - 6.0 %    Basophils % 0.4 0.0 - 2.0 %    Neutrophils Absolute 7.42 (H) 1.80 - 7.30 E9/L    Immature Granulocytes # 0.03 E9/L    Lymphocytes Absolute 0.72 (L) 1.50 - 4.00 E9/L    Monocytes Absolute 0.75 0.10 - 0.95 E9/L    Eosinophils Absolute 0.08 0.05 - 0.50 E9/L course. Diagnosis:  1. Hyperkalemia    2. Other fatigue    3. Failure to thrive in adult    4. Anxiety state        Disposition:  Patient's disposition: Admit to telemetry  Patient's condition is stable.            Zhane Campbell DO  Resident  07/09/22 1640

## 2022-07-09 NOTE — H&P
[address] [facility]                  [address]                              HISTORY AND PHYSICAL    PATIENT NAME: Bryan Vazquez                      :        1950  MED REC NO:   [id number]                            ROOM:       4505  ACCOUNT NO:   [de-identified]                           ADMIT DATE: 2022  PROVIDER:     Siomara Sethi MD    CHIEF COMPLAINT:  Acute-on-chronic renal failure and inability to take  care for herself at home and severe osteoarthritis of the knees. HISTORY OF PRESENT ILLNESS:  This 69-year-old who has been a skilled  nursing facility for quite some time, was finally discharged to home, I  believe on the . The patient just was unable to care for herself due  to her chronic osteoarthritis of her knees, renal failure, decubiti  ulcer of her sacrum, and was brought back to the emergency room by  family members for placement. The patient's biggest complaint at  present is constipation. The patient also gets hemodialysis on Monday,  Wednesday, and Friday and missed her Wednesday dialysis, prompting  hemodialysis to be performed last night and I believe this morning. The  patient is admitted for placement at this time. RECENT AND PRESENT MEDICATIONS:  See med rec in the chart. PAST MEDICAL HISTORY:  Positive for chronic kidney disease on  hemodialysis, positive for hypertension, diabetes, severe osteoarthritis  of the knees, and remote history of sarcoidosis. SOCIAL HISTORY:  Had just got out of the nursing home, where she had  resided for quite some time. Does not smoke. Does not drink. FAMILY MEDICAL HISTORY:  Noncontributory at this point. REVIEW OF SYSTEMS:  SKIN AND LYMPHATICS:  Negative. CENTRAL NERVOUS SYSTEM:  Negative. CIRCULATORY:  Denies chest pain, orthopnea, or PND. DIGESTIVE:  Does complain of chronic constipation.   Denies abdominal  pain, nausea, or vomiting at this time.  GENITOURINARY:  Denies dysuria, frequency, or hematuria. MUSCULOSKELETAL:  She has severe nonoperative osteoarthritis of both  knees. PHYSICAL EXAMINATION:  VITAL SIGNS:  Temperature is 97.8, pulse 100, respirations 18 and  non-labored, blood pressure 128/62, O2 sat is 99% on room air. SKIN:  Shows some pallor, but no jaundice. HEENT:  Eyes reveal no icterus. NECK:  Supple without bruits or masses. CHEST:  Clear to auscultation. HEART:  Regular rate and rhythm at this time. ABDOMEN:  Soft and nontender. Obese. No masses noted. EXTREMITIES:  Reveal chronic osteoarthritis changes of the knees. No  leg edema at this point. No calf tenderness. NEUROLOGICAL:  She is alert and oriented x3 with no focal neurological  deficit. She does have still a chronic sacral decubitus. IMPRESSION:  This is an unfortunate 79-year-old who was just discharged  to home after a long stay at the skilled nursing facility, who is just  unable to care for self, has a problem with ambulation, constipation,  and also has renal failure and missed her hemodialysis appointment on  Wednesday. PLAN:  The patient is admitted. Hemodialysis per Nephrology. Wound  consult for her chronic sacral decubiti. We will try to get her bowels  moving with some mag citrate and possibly an enema. The biggest thing  she needs is to be replaced in a skilled nursing facility due to the  fact that she cannot care for herself at home. DISCHARGE PLAN:  Skilled nursing facility once stable.         Antonino Rojas MD    D: 07/09/2022 9:44:52       T: 07/09/2022 9:47:15     [email or web address]  Job#: [id number]     Doc#: [id number]    CC:

## 2022-07-10 LAB
ANION GAP SERPL CALCULATED.3IONS-SCNC: 14 MMOL/L (ref 7–16)
BASOPHILS ABSOLUTE: 0.05 E9/L (ref 0–0.2)
BASOPHILS RELATIVE PERCENT: 0.9 % (ref 0–2)
BUN BLDV-MCNC: 42 MG/DL (ref 6–23)
CALCIUM SERPL-MCNC: 9 MG/DL (ref 8.6–10.2)
CHLORIDE BLD-SCNC: 101 MMOL/L (ref 98–107)
CO2: 27 MMOL/L (ref 22–29)
CREAT SERPL-MCNC: 5.4 MG/DL (ref 0.5–1)
EOSINOPHILS ABSOLUTE: 0.26 E9/L (ref 0.05–0.5)
EOSINOPHILS RELATIVE PERCENT: 4.5 % (ref 0–6)
GFR AFRICAN AMERICAN: 9
GFR NON-AFRICAN AMERICAN: 8 ML/MIN/1.73
GLUCOSE BLD-MCNC: 137 MG/DL (ref 74–99)
HCT VFR BLD CALC: 29.7 % (ref 34–48)
HEMOGLOBIN: 9.3 G/DL (ref 11.5–15.5)
IMMATURE GRANULOCYTES #: 0.02 E9/L
IMMATURE GRANULOCYTES %: 0.3 % (ref 0–5)
LYMPHOCYTES ABSOLUTE: 1.01 E9/L (ref 1.5–4)
LYMPHOCYTES RELATIVE PERCENT: 17.6 % (ref 20–42)
MAGNESIUM: 3.1 MG/DL (ref 1.6–2.6)
MCH RBC QN AUTO: 33.3 PG (ref 26–35)
MCHC RBC AUTO-ENTMCNC: 31.3 % (ref 32–34.5)
MCV RBC AUTO: 106.5 FL (ref 80–99.9)
METER GLUCOSE: 120 MG/DL (ref 74–99)
METER GLUCOSE: 141 MG/DL (ref 74–99)
METER GLUCOSE: 144 MG/DL (ref 74–99)
METER GLUCOSE: 87 MG/DL (ref 74–99)
MONOCYTES ABSOLUTE: 0.62 E9/L (ref 0.1–0.95)
MONOCYTES RELATIVE PERCENT: 10.8 % (ref 2–12)
NEUTROPHILS ABSOLUTE: 3.77 E9/L (ref 1.8–7.3)
NEUTROPHILS RELATIVE PERCENT: 65.9 % (ref 43–80)
PDW BLD-RTO: 15.1 FL (ref 11.5–15)
PHOSPHORUS: 5.9 MG/DL (ref 2.5–4.5)
PLATELET # BLD: 166 E9/L (ref 130–450)
PMV BLD AUTO: 9.8 FL (ref 7–12)
POTASSIUM SERPL-SCNC: 5.1 MMOL/L (ref 3.5–5)
RBC # BLD: 2.79 E12/L (ref 3.5–5.5)
SODIUM BLD-SCNC: 142 MMOL/L (ref 132–146)
WBC # BLD: 5.7 E9/L (ref 4.5–11.5)

## 2022-07-10 PROCEDURE — 97161 PT EVAL LOW COMPLEX 20 MIN: CPT

## 2022-07-10 PROCEDURE — 6360000002 HC RX W HCPCS: Performed by: FAMILY MEDICINE

## 2022-07-10 PROCEDURE — 36415 COLL VENOUS BLD VENIPUNCTURE: CPT

## 2022-07-10 PROCEDURE — G0378 HOSPITAL OBSERVATION PER HR: HCPCS

## 2022-07-10 PROCEDURE — 82962 GLUCOSE BLOOD TEST: CPT

## 2022-07-10 PROCEDURE — 96372 THER/PROPH/DIAG INJ SC/IM: CPT

## 2022-07-10 PROCEDURE — 6370000000 HC RX 637 (ALT 250 FOR IP): Performed by: FAMILY MEDICINE

## 2022-07-10 PROCEDURE — 97530 THERAPEUTIC ACTIVITIES: CPT

## 2022-07-10 PROCEDURE — 83735 ASSAY OF MAGNESIUM: CPT

## 2022-07-10 PROCEDURE — 80048 BASIC METABOLIC PNL TOTAL CA: CPT

## 2022-07-10 PROCEDURE — 84100 ASSAY OF PHOSPHORUS: CPT

## 2022-07-10 PROCEDURE — 85025 COMPLETE CBC W/AUTO DIFF WBC: CPT

## 2022-07-10 PROCEDURE — 2580000003 HC RX 258: Performed by: FAMILY MEDICINE

## 2022-07-10 PROCEDURE — 2060000000 HC ICU INTERMEDIATE R&B

## 2022-07-10 RX ADMIN — GABAPENTIN 300 MG: 300 CAPSULE ORAL at 14:44

## 2022-07-10 RX ADMIN — Medication 10 ML: at 22:22

## 2022-07-10 RX ADMIN — BUSPIRONE HYDROCHLORIDE 5 MG: 5 TABLET ORAL at 22:21

## 2022-07-10 RX ADMIN — POLYETHYLENE GLYCOL 3350 17 G: 17 POWDER, FOR SOLUTION ORAL at 08:30

## 2022-07-10 RX ADMIN — HEPARIN SODIUM 5000 UNITS: 10000 INJECTION INTRAVENOUS; SUBCUTANEOUS at 14:44

## 2022-07-10 RX ADMIN — DOCUSATE SODIUM 100 MG: 100 CAPSULE, LIQUID FILLED ORAL at 08:30

## 2022-07-10 RX ADMIN — Medication 2000 UNITS: at 08:30

## 2022-07-10 RX ADMIN — INSULIN GLARGINE 10 UNITS: 100 INJECTION, SOLUTION SUBCUTANEOUS at 22:13

## 2022-07-10 RX ADMIN — GABAPENTIN 300 MG: 300 CAPSULE ORAL at 08:30

## 2022-07-10 RX ADMIN — HEPARIN SODIUM 5000 UNITS: 10000 INJECTION INTRAVENOUS; SUBCUTANEOUS at 22:21

## 2022-07-10 RX ADMIN — ALOGLIPTIN 6.25 MG: 6.25 TABLET, FILM COATED ORAL at 08:30

## 2022-07-10 RX ADMIN — SENNOSIDES 17.2 MG: 8.6 TABLET, FILM COATED ORAL at 22:21

## 2022-07-10 RX ADMIN — METOPROLOL SUCCINATE 25 MG: 25 TABLET, EXTENDED RELEASE ORAL at 08:30

## 2022-07-10 RX ADMIN — BUSPIRONE HYDROCHLORIDE 10 MG: 10 TABLET ORAL at 16:07

## 2022-07-10 RX ADMIN — BUSPIRONE HYDROCHLORIDE 10 MG: 10 TABLET ORAL at 08:30

## 2022-07-10 RX ADMIN — HEPARIN SODIUM 5000 UNITS: 10000 INJECTION INTRAVENOUS; SUBCUTANEOUS at 06:41

## 2022-07-10 RX ADMIN — DOCUSATE SODIUM 100 MG: 100 CAPSULE, LIQUID FILLED ORAL at 22:21

## 2022-07-10 RX ADMIN — GABAPENTIN 300 MG: 300 CAPSULE ORAL at 22:21

## 2022-07-10 RX ADMIN — Medication 10 ML: at 08:31

## 2022-07-10 RX ADMIN — ESCITALOPRAM OXALATE 10 MG: 10 TABLET ORAL at 08:30

## 2022-07-10 RX ADMIN — SENNOSIDES 17.2 MG: 8.6 TABLET, FILM COATED ORAL at 08:30

## 2022-07-10 ASSESSMENT — PAIN SCALES - GENERAL: PAINLEVEL_OUTOF10: 0

## 2022-07-10 NOTE — FLOWSHEET NOTE
Call placed to pain management at [phone] with  [facility] for consult message left will return call Monday when office open leaving consult up on summary page for reminder

## 2022-07-10 NOTE — PROGRESS NOTES
Nephrology Progess Note  [facility]     Reason for Consult:  ESRD  Requesting Physician:  Dr Bree Woods   Date of Service: 7/10/2022   Chief Complaint:  Weakness   History Obtained From:  Patient, medical record      History of Present Ilness:    FROM 7-9    Ms Timo Maya is a 66year old female who we are consulted on for evaluation and management of ESRD    She presented to the hospital on 7-8. She left her nursing home on 7-5. She has been weak since that time. No chest pain, shortness of breath, abdominal pain, n/v. She does have constipation    She has ESRD and receives HD MWF via [facility]. She missed treatment on 7-6 but did receive a full treatment on 7-8. As her K was 6.4 on admission, she received emergent dialysis overnight. SUBJECTIVE  Seen and examined  Feels better after BM  No chest pain, sob, abd pain, nausea  Wants to work with therapy       Past Medical History:        Diagnosis Date    Anemia in chronic kidney disease     Anxiety and depression     Arthritis     Chronic pain syndrome     COVID-19 05/2020    COVID-19     [facility]     Dysphagia, oral phase     GERD (gastroesophageal reflux disease)     Hemodialysis [facility] ([facility]     M-W-F    Hyperkalemia     Hypertension     Hypertensive kidney disease with stage 4 chronic kidney disease (HCC)     Insomnia     Kidney stones     MDD (major depressive disorder)     Moderate protein-calorie malnutrition (HCC)     Morbid obesity (HCC)     Muscle weakness (generalized)     Obesity     Pressure ulcer of sacral region     Sacral pressure ulcer 08/03/2021    stage 4    Unspecified osteoarthritis, unspecified site     Weakness generalized        Past Surgical History:        Procedure Laterality Date    ABDOMEN SURGERY N/A 5/4/2020    SACRAL WOUND DEBRIDEMENT CALL   WITH TIME AVAIL AM performed by Jaswinder Jackson MD at [address] [place] [address]  x3   [address] [place] [address]  3/31/16    Laparoscopic- Wanda    CYSTOSCOPY  2011     for kidney stones    DIALYSIS FISTULA CREATION Left 8/5/2021    INSERTION FISTULA LEFT ARM performed by Johann Gonzalez MD at [address] Right 11/18/2021    REVISION AV FISTULA LEFT ARM performed by Johann Gonzalez MD at [address]  2009     right     UPPER GASTROINTESTINAL ENDOSCOPY  2.18.15    Dr. Loulou Sandoval Findings: Mild Gastrits and Duodenitis, 2cm Hiatal Hernia    UPPER GASTROINTESTINAL ENDOSCOPY N/A 5/8/2020    EGD CONTROL HEMORRHAGE performed by Erendira Knox MD at [address] 5/22/2020    EGD BEDSIDE performed by Griffin Kenyon MD at [address] N/A 5/6/2021    INSERTION TUNNELED DIALYSIS CATHETER performed by Johann Gonzalez MD at [facility] OR       Current Medications:    Current Facility-Administered Medications: bisacodyl (DULCOLAX) EC tablet 10 mg, 10 mg, Oral, Daily PRN  busPIRone (BUSPAR) tablet 10 mg, 10 mg, Oral, BID  busPIRone (BUSPAR) tablet 5 mg, 5 mg, Oral, Nightly  docusate sodium (COLACE) capsule 100 mg, 100 mg, Oral, BID  escitalopram (LEXAPRO) tablet 10 mg, 10 mg, Oral, Daily  ferric citrate (AURYXIA) tablet 420 mg, 420 mg, Oral, TID WC  gabapentin (NEURONTIN) capsule 300 mg, 300 mg, Oral, TID  insulin glargine (LANTUS) injection vial 10 Units, 10 Units, SubCUTAneous, Nightly  insulin lispro (HUMALOG) injection vial 10 Units, 10 Units, SubCUTAneous, TID AC  linaCLOtide (LINZESS) capsule 72 mcg, 72 mcg, Oral, QAM AC  magnesium hydroxide (MILK OF MAGNESIA) 400 MG/5ML suspension 30 mL, 30 mL, Oral, Daily PRN  metoprolol succinate (TOPROL XL) extended release tablet 25 mg, 25 mg, Oral, Daily  midodrine (PROAMATINE) tablet 15 mg, 15 mg, Oral, Once per day on Mon Wed Fri  polyethylene glycol (GLYCOLAX) packet 17 g, 17 g, Oral, Daily  senna (SENOKOT) tablet 17.2 mg, 2 tablet, Oral, BID  traZODone (DESYREL) tablet 25 mg, 25 mg, Oral, Nightly PRN  vitamin D (CHOLECALCIFEROL) tablet 2,000 Units, 2,000 Units, Oral, Daily  sodium chloride flush 0.9 % injection 5-40 mL, 5-40 mL, IntraVENous, 2 times per day  sodium chloride flush 0.9 % injection 5-40 mL, 5-40 mL, IntraVENous, PRN  0.9 % sodium chloride infusion, , IntraVENous, PRN  heparin (porcine) injection 5,000 Units, 5,000 Units, SubCUTAneous, 3 times per day  acetaminophen (TYLENOL) tablet 650 mg, 650 mg, Oral, Q6H PRN **OR** acetaminophen (TYLENOL) suppository 650 mg, 650 mg, Rectal, Q6H PRN  glucose chewable tablet 16 g, 4 tablet, Oral, PRN  dextrose bolus 10% 125 mL, 125 mL, IntraVENous, PRN **OR** dextrose bolus 10% 250 mL, 250 mL, IntraVENous, PRN  glucagon (rDNA) injection 1 mg, 1 mg, IntraMUSCular, PRN  dextrose 5 % solution, 100 mL/hr, IntraVENous, PRN  alogliptin (NESINA) tablet 6.25 mg, 6.25 mg, Oral, Daily  oxyCODONE-acetaminophen (PERCOCET) 5-325 MG per tablet 1 tablet, 1 tablet, Oral, Q8H PRN **AND** oxyCODONE (ROXICODONE) immediate release tablet 2.5 mg, 2.5 mg, Oral, Q8H PRN  prochlorperazine (COMPAZINE) tablet 5 mg, 5 mg, Oral, Q6H PRN    Allergies:  Patient has no known allergies. Social History:    No tobacco    Family History:   No kidney disease     Review of Systems:   Pertinent positives stated above in HPI. All other systems were reviewed and were negative.     Physical exam:   Constitutional:  VITALS:  BP (!) 103/54   Pulse 68   Temp 97.7 °F (36.5 °C) (Oral)   Resp 17   Ht 5' 1\" (1.549 m)   Wt 202 lb 3.2 oz (91.7 kg)   SpO2 100%   BMI 38.21 kg/m²     Gen: alert, awake, no acute distress  Eyes: anicteric sclerae, eomi  HEENT: atraumatic/normocephalic  Neck: supple, no JVD  Lungs: clear to ascultation bilaterally, equal lung expansion  CV: RRR, no murmurs,  Abdomen: soft, nontender, nondistended, normoactive bowel sounds  Extremitiy: no edema  : no CVA tenderness  Skin: no rash, tugor wnl  Neuro: no focal deficits  Psych: cooperative and appropriate      Data: Last 3 CMP:    Recent Labs     07/08/22  2142      K 6.4*   CL 97*   CO2 26   BUN 40*   CREATININE 4.5*   GLUCOSE 180*   CALCIUM 9.8         Last 3 Glucose:     Recent Labs     07/08/22  2142   GLUCOSE 180*         Last 3 POC Glucose:     No results for input(s): POCGLU in the last 72 hours. Last 3 CK, CKMB, Troponin  No results for input(s): CKTOTAL, CKMB, TROPONINI in the last 72 hours. Last 3 CBC:  Recent Labs     07/08/22  2142   WBC 9.0   RBC 2.87*   HGB 9.7*   HCT 29.7*   .5*   MCH 33.8   MCHC 32.7   RDW 14.9      MPV 9.9       Last 3 Hepatic Function Panel:  No results for input(s): ALKPHOS, ALT, AST, PROT, BILITOT, BILIDIR, LABALBU in the last 72 hours. Albumin:  No results for input(s): LABALBU in the last 72 hours. Calcium:  Recent Labs     07/08/22  2142   CALCIUM 9.8       Ionized Calcium:  No results for input(s): IONCA in the last 72 hours. Magnesium:    No results for input(s): MG in the last 72 hours. ABGs:  No results for input(s): PHART, PO2ART, PER6KOL, TQO3IQQ, BEART, D2GNEUJD in the last 72 hours. Lactic Acid:  Recent Labs     07/08/22  2142   LACTA 2.0       Last 3 Amylase:  No results for input(s): AMYLASE in the last 72 hours. Last 3 Lipase:  Recent Labs     07/08/22  2142   LIPASE 13       Last 3 BNP:  No results for input(s): BNP in the last 72 hours.             Assessment/Plan      1 ESRD  HD MWF via [facility]  Missed outpatient HD on 7/6 but received outpatient treatment on 7/8  Received emergent HD on admission due to hyperkalemia  Will follow daily for dialysis needs  Next treatment scheduled for 7/11    2 HTN with CKD V  Well controlled  She has had lower readings - on midodrine    3 Hyperkalemia  K 6.4 on admission  Should improve with dialysis  Repeat pending     4 Anemia with CKD  No reported blood loss  Follow Hgb - close to goal for ESRD  Give ARUN as needed    5 Hyperphosphatemia  On binders chronically  Follow PO4 and adjust as needed      Of note, labs ordered but results pending at the time of this note    Thank you for the opportunity to participate in the care of Ms Magaly Tse       ______________________________      Norma Long MD  7/10/2022  10:28 AM

## 2022-07-10 NOTE — CARE COORDINATION
Met w/ patient. Explained role of  and plan of care. Pt is staying with her son Daljit Santiago in a 2 story house- 2 total steps to entrance. Uses walker and JUSTINA. Receives dialysis @ [address] [place] [address] M-W-F- notify when pt is discharging [phone]- no answer when attempted to notify [address] that pt was admitted. Hx [facility]. Hx C and YASIR in [facility]. PCP is Dr. Marco Palencia and pharmacy is [facility]. PT am-pac 13. Physician is requesting Acute rehab on discharge- PMR order on chart. YASIR list given to pt to review if acute rehab declines pt. SW/CM to follow up with AR decision. Eduardo King, RN case manager    The Plan for Transition of Care is related to the following treatment goals: physical conditioning    The Patient and/or patient representative Art San was provided with a choice of provider and agrees   with the discharge plan. [x] Yes [] No    Freedom of choice list was provided with basic dialogue that supports the patient's individualized plan of care/goals, treatment preferences and shares the quality data associated with the providers.  [x] Yes [] No

## 2022-07-10 NOTE — PROGRESS NOTES
Subjective: The patient is awake and alert. Had BM feels much better. No problems overnight. Denies chest pain, angina, and dyspnea. Denies abdominal pain. Tolerating diet. No nausea or vomiting. Objective:    BP (!) 103/54   Pulse 68   Temp 97.7 °F (36.5 °C) (Oral)   Resp 17   Ht 5' 1\" (1.549 m)   Wt 202 lb 3.2 oz (91.7 kg)   SpO2 100%   BMI 38.21 kg/m²     Heart:  RRR, no murmurs, gallops, or rubs.   Lungs:  CTA bilaterally, no wheeze, rales or rhonchi  Abd: bowel sounds present, nontender, nondistended, no masses  Extrem:  No clubbing, cyanosis, or edema    CBC with Differential:    Lab Results   Component Value Date/Time    WBC 9.0 07/08/2022 09:42 PM    RBC 2.87 07/08/2022 09:42 PM    HGB 9.7 07/08/2022 09:42 PM    HCT 29.7 07/08/2022 09:42 PM     07/08/2022 09:42 PM    .5 07/08/2022 09:42 PM    MCH 33.8 07/08/2022 09:42 PM    MCHC 32.7 07/08/2022 09:42 PM    RDW 14.9 07/08/2022 09:42 PM    NRBC 0.9 05/25/2020 02:00 PM    SEGSPCT 71 08/16/2013 05:00 AM    BANDSPCT 1 03/29/2016 07:37 PM    METASPCT 0.9 05/10/2020 03:45 AM    LYMPHOPCT 8.0 07/08/2022 09:42 PM    PROMYELOPCT 0.9 05/09/2020 04:15 AM    MONOPCT 8.3 07/08/2022 09:42 PM    MYELOPCT 1.7 05/11/2020 04:45 AM    BASOPCT 0.4 07/08/2022 09:42 PM    MONOSABS 0.75 07/08/2022 09:42 PM    LYMPHSABS 0.72 07/08/2022 09:42 PM    EOSABS 0.08 07/08/2022 09:42 PM    BASOSABS 0.04 07/08/2022 09:42 PM     CMP:    Lab Results   Component Value Date/Time     07/08/2022 09:42 PM    K 6.4 07/08/2022 09:42 PM    CL 97 07/08/2022 09:42 PM    CO2 26 07/08/2022 09:42 PM    BUN 40 07/08/2022 09:42 PM    CREATININE 4.5 07/08/2022 09:42 PM    GFRAA 12 07/08/2022 09:42 PM    LABGLOM 10 07/08/2022 09:42 PM    GLUCOSE 180 07/08/2022 09:42 PM    GLUCOSE 149 10/12/2011 09:59 AM    PROT 6.0 04/13/2022 02:55 AM    LABALBU 3.0 04/13/2022 02:55 AM    LABALBU 4.1 10/12/2011 09:59 AM    CALCIUM 9.8 07/08/2022 09:42 PM    BILITOT <0.2 04/13/2022 02:55 AM ALKPHOS 115 04/13/2022 02:55 AM     04/13/2022 02:55 AM    ALT 34 04/13/2022 02:55 AM        Assessment:    Patient Active Problem List   Diagnosis    Neurologic gait dysfunction    Renal failure, acute on chronic (HCC)    Diabetes mellitus ([address]    Hypertension    Arthritis    Kidney disease    Knee problem    Anemia due to stage 3 chronic kidney disease    Primary osteoarthritis of both knees    Acute on chronic renal insufficiency    Acute kidney injury superimposed on CKD (HCC)    Acute renal failure superimposed on chronic kidney disease, on chronic dialysis ([facility])    Moderate protein-calorie malnutrition (HCC)    PERLA (acute kidney injury) ([address]    Stage 5 chronic kidney disease on chronic dialysis (HCC)    Pressure injury of sacral region, stage 4 (HCC)    Pressure injury of right hip, stage 3 (HCC)    Syncope and collapse    Unresponsive episode    Left bundle branch block    Pure hypercholesterolemia    Moderate obesity    Sepsis ([address]    Failure to thrive in adult       Plan:  Acute rehab consult        Janet Arita MD  8:28 AM  7/10/2022

## 2022-07-10 NOTE — PROGRESS NOTES
Physical Therapy  Facility/Department: [facility]  Physical Therapy Initial Assessment    Name: Marisela Hall  : 1950  MRN: [id number]  Date of Service: 7/10/2022        Patient Diagnosis(es): The primary encounter diagnosis was Hyperkalemia. Diagnoses of Other fatigue, Failure to thrive in adult, and Anxiety state were also pertinent to this visit. Past Medical History:  has a past medical history of Anemia in chronic kidney disease, Anxiety and depression, Arthritis, Chronic pain syndrome, COVID-19, COVID-19, Diabetes mellitus ([address] Dysphagia, oral phase, GERD (gastroesophageal reflux disease), Hemodialysis patient ([address] Hyperkalemia, Hypertension, Hypertensive kidney disease with stage 4 chronic kidney disease ([address] Insomnia, Kidney stones, MDD (major depressive disorder), Moderate protein-calorie malnutrition ([address] Morbid obesity ([address] Muscle weakness (generalized), Obesity, Pressure ulcer of sacral region, Sacral pressure ulcer, Unspecified osteoarthritis, unspecified site, and Weakness generalized. Past Surgical History:  has a past surgical history that includes Foot surgery (2009 ); Cystocopy (2011 );  section (x3); Upper gastrointestinal endoscopy (2.18.15); Cholecystectomy (3/31/16); Abdomen surgery (N/A, 2020); Upper gastrointestinal endoscopy (N/A, 2020); Upper gastrointestinal endoscopy (N/A, 2020); vascular surgery (N/A, 2021); Dialysis fistula creation (Left, 2021); and Dialysis fistula creation (Right, 2021). Requires PT Follow-Up: Yes       Evaluating Therapist: Mami Segovia PT     Referring Provider:  Yvrose Romeo MD    PT order : PT eval and treat     Room #: 862   DIAGNOSIS:  Failure to thrive   Additional Pertinent History:recently home after in SNF x 2 years. PRECAUTIONS:  falls    Social:  Pt lives with  Son  in a  2  floor plan  With stair glide to second floor, but pt reports battery on lift is dead. 1+1  steps  to enter.   Prior to admission pt walked with  ww short distances or uses a w/c      Initial Evaluation  Date: 7/10/2022  Treatment      Short Term/ Long Term   Goals   Was pt agreeable to Eval/treatment? yes      Does pt have pain?  coccyx wound      Bed Mobility  Rolling:  Min assist for full 1/4 roll   Supine to sit:  SBA   Sit to supine: mod assist   Scooting:  Max assist in supine    SBA to min assist    Transfers Sit to stand: Mod assist   Stand to sit:  Mod assist   Stand pivot:  NT    CGA    Ambulation     3 feet forward and backward  with ww  with  Min assist progressing to mod assist    25  feet with  ww  with  CGA        Stair negotiation: ascended and descended NT   TBA    LE ROM  Grossly WFL      LE strength  3+ to 4-/ 5   4/ 5    AM- PAC RAW score   13/ 24            Pt is alert and Oriented x  3      Balance:  Min/mod assist . Fall risk due to  Weakness, poor balance and activity tolerance   Endurance: decreased   Bed/Chair alarm: Yes      ASSESSMENT  Pt displays functional ability as noted in the objective portion of this evaluation. Conditions Requiring Skilled Therapeutic Intervention:    [x]Decreased strength     []Decreased ROM  [x]Decreased functional mobility  [x]Decreased balance   [x]Decreased endurance   [x]Decreased posture  []Decreased sensation  []Decreased coordination   []Decreased vision  []Decreased safety awareness   [x]Increased pain       Treatment/Education:    Pt in bed  upon arrival ; agreeable to PT. Mobility as above. SBA supine to sit. Cues needed for hand placement with transfers. Pt able to take a few steps forward, but poor activity tolerance. Amb with flexed knees and instability noted in B LEs. Min assist with gait initially, but quickly progressed to mod assist. Mod assist stand to sit  - high fall risk due to pt's height and weakness with this as she sat before getting completely back to the bed. Pt required assist with B LEs with sit to supine.  Instructed on supine APs, QS, GA, heel slides, and SLR     Pt educated on fall risk,  Safety with mobility        Patient response to education:   Pt verbalized understanding Pt demonstrated skill Pt requires further education in this area   x With assist   x       Comments:  Pt left  In bed after session, with call light in reach. Positioned with TAPs       Rehab potential is Good for reaching above PT goals. Pts/ family goals   1. Increased strength, rehab     Patient and or family understand(s) diagnosis, prognosis, and plan of care. -  Yes     PLAN  PT care will be provided in accordance with the objectives noted above. Whenever appropriate, clear delegation orders will be provided for nursing staff. Exercises and functional mobility practice will be used as well as appropriate assistive devices or modalities to obtain goals. Patient and family education will also be administered as needed. PLAN OF CARE:    Current Treatment Recommendations     [x] Strengthening to improve independence with functional mobility   [] ROM to improve independence with functional mobility   [x] Balance Training to improve static/dynamic balance and to reduce fall risk  [x] Endurance Training to improve activity tolerance during functional mobility    Transfer Training to improve safety and independence with all functional transfers   [][x] Gait Training to improve gait mechanics, endurance and assess need for appropriate assistive device  [] Stair Training in preparation for safe discharge home and/or into the community   [x] Positioning to prevent skin breakdown and contractures  [x] Safety and Education Training   [x] Patient/Caregiver Education   [] HEP  [] Other     Frequency of treatments will be 2-5x/week x  7-14 days.     Time in: 0902   Time out: 0925      Evaluation Time includes thorough review of current medical information, gathering information on past medical history/social history and prior level of function, completion of standardized testing/informal observation of tasks, assessment of data and education on plan of care and goals.     CPT codes:  [x] Low Complexity PT evaluation 14390  [] Moderate Complexity PT evaluation 92228  [] High Complexity PT evaluation 94035  [] PT Re-evaluation 22026  [] Gait training 39267  minutes  [x] Therapeutic activities 04343 8 minutes  [] Therapeutic exercises 25105  minutes  [] Neuromuscular reeducation 48207  minutes       [facility] [address] number:  PT 9309

## 2022-07-11 LAB
ANION GAP SERPL CALCULATED.3IONS-SCNC: 14 MMOL/L (ref 7–16)
BUN BLDV-MCNC: 53 MG/DL (ref 6–23)
CALCIUM SERPL-MCNC: 8.8 MG/DL (ref 8.6–10.2)
CHLORIDE BLD-SCNC: 102 MMOL/L (ref 98–107)
CO2: 26 MMOL/L (ref 22–29)
CREAT SERPL-MCNC: 6.4 MG/DL (ref 0.5–1)
GFR AFRICAN AMERICAN: 8
GFR NON-AFRICAN AMERICAN: 6 ML/MIN/1.73
GLUCOSE BLD-MCNC: 95 MG/DL (ref 74–99)
HCT VFR BLD CALC: 29.5 % (ref 34–48)
HEMOGLOBIN: 8.9 G/DL (ref 11.5–15.5)
MCH RBC QN AUTO: 33.2 PG (ref 26–35)
MCHC RBC AUTO-ENTMCNC: 30.2 % (ref 32–34.5)
MCV RBC AUTO: 110.1 FL (ref 80–99.9)
METER GLUCOSE: 105 MG/DL (ref 74–99)
METER GLUCOSE: 105 MG/DL (ref 74–99)
METER GLUCOSE: 111 MG/DL (ref 74–99)
METER GLUCOSE: 208 MG/DL (ref 74–99)
METER GLUCOSE: 43 MG/DL (ref 74–99)
METER GLUCOSE: 58 MG/DL (ref 74–99)
METER GLUCOSE: 69 MG/DL (ref 74–99)
PDW BLD-RTO: 15 FL (ref 11.5–15)
PHOSPHORUS: 7 MG/DL (ref 2.5–4.5)
PLATELET # BLD: 176 E9/L (ref 130–450)
PMV BLD AUTO: 9.9 FL (ref 7–12)
POTASSIUM SERPL-SCNC: 5.6 MMOL/L (ref 3.5–5)
RBC # BLD: 2.68 E12/L (ref 3.5–5.5)
SODIUM BLD-SCNC: 142 MMOL/L (ref 132–146)
WBC # BLD: 6.2 E9/L (ref 4.5–11.5)

## 2022-07-11 PROCEDURE — 85027 COMPLETE CBC AUTOMATED: CPT

## 2022-07-11 PROCEDURE — G0378 HOSPITAL OBSERVATION PER HR: HCPCS

## 2022-07-11 PROCEDURE — 90935 HEMODIALYSIS ONE EVALUATION: CPT

## 2022-07-11 PROCEDURE — 82962 GLUCOSE BLOOD TEST: CPT

## 2022-07-11 PROCEDURE — 36415 COLL VENOUS BLD VENIPUNCTURE: CPT

## 2022-07-11 PROCEDURE — 96372 THER/PROPH/DIAG INJ SC/IM: CPT

## 2022-07-11 PROCEDURE — 84100 ASSAY OF PHOSPHORUS: CPT

## 2022-07-11 PROCEDURE — 87340 HEPATITIS B SURFACE AG IA: CPT

## 2022-07-11 PROCEDURE — 2580000003 HC RX 258: Performed by: FAMILY MEDICINE

## 2022-07-11 PROCEDURE — 6370000000 HC RX 637 (ALT 250 FOR IP): Performed by: INTERNAL MEDICINE

## 2022-07-11 PROCEDURE — 80048 BASIC METABOLIC PNL TOTAL CA: CPT

## 2022-07-11 PROCEDURE — 6360000002 HC RX W HCPCS: Performed by: FAMILY MEDICINE

## 2022-07-11 PROCEDURE — 2060000000 HC ICU INTERMEDIATE R&B

## 2022-07-11 PROCEDURE — 6360000002 HC RX W HCPCS: Performed by: INTERNAL MEDICINE

## 2022-07-11 PROCEDURE — 6370000000 HC RX 637 (ALT 250 FOR IP): Performed by: FAMILY MEDICINE

## 2022-07-11 RX ORDER — GABAPENTIN 100 MG/1
100 CAPSULE ORAL 3 TIMES DAILY
Status: DISCONTINUED | OUTPATIENT
Start: 2022-07-11 | End: 2022-07-14 | Stop reason: HOSPADM

## 2022-07-11 RX ADMIN — BUSPIRONE HYDROCHLORIDE 5 MG: 5 TABLET ORAL at 20:06

## 2022-07-11 RX ADMIN — MIDODRINE HYDROCHLORIDE 15 MG: 5 TABLET ORAL at 13:21

## 2022-07-11 RX ADMIN — DOCUSATE SODIUM 100 MG: 100 CAPSULE, LIQUID FILLED ORAL at 13:20

## 2022-07-11 RX ADMIN — ESCITALOPRAM OXALATE 10 MG: 10 TABLET ORAL at 13:20

## 2022-07-11 RX ADMIN — METOPROLOL SUCCINATE 25 MG: 25 TABLET, EXTENDED RELEASE ORAL at 13:20

## 2022-07-11 RX ADMIN — Medication 2000 UNITS: at 13:20

## 2022-07-11 RX ADMIN — Medication 10 ML: at 20:07

## 2022-07-11 RX ADMIN — SENNOSIDES 17.2 MG: 8.6 TABLET, FILM COATED ORAL at 13:20

## 2022-07-11 RX ADMIN — ALOGLIPTIN 6.25 MG: 6.25 TABLET, FILM COATED ORAL at 13:20

## 2022-07-11 RX ADMIN — HEPARIN SODIUM 5000 UNITS: 10000 INJECTION INTRAVENOUS; SUBCUTANEOUS at 22:08

## 2022-07-11 RX ADMIN — GABAPENTIN 100 MG: 100 CAPSULE ORAL at 20:06

## 2022-07-11 RX ADMIN — HEPARIN SODIUM 5000 UNITS: 10000 INJECTION INTRAVENOUS; SUBCUTANEOUS at 13:22

## 2022-07-11 RX ADMIN — INSULIN LISPRO 10 UNITS: 100 INJECTION, SOLUTION INTRAVENOUS; SUBCUTANEOUS at 15:59

## 2022-07-11 RX ADMIN — Medication 16 G: at 22:16

## 2022-07-11 RX ADMIN — Medication 10 ML: at 13:22

## 2022-07-11 RX ADMIN — EPOETIN ALFA-EPBX 10000 UNITS: 10000 INJECTION, SOLUTION INTRAVENOUS; SUBCUTANEOUS at 13:36

## 2022-07-11 RX ADMIN — BUSPIRONE HYDROCHLORIDE 10 MG: 10 TABLET ORAL at 15:59

## 2022-07-11 RX ADMIN — GABAPENTIN 100 MG: 100 CAPSULE ORAL at 13:21

## 2022-07-11 RX ADMIN — HEPARIN SODIUM 5000 UNITS: 10000 INJECTION INTRAVENOUS; SUBCUTANEOUS at 07:03

## 2022-07-11 NOTE — PROGRESS NOTES
Department of Internal Medicine  Nephrology Attending Progress Note        SUBJECTIVE:  Mrs Jonathan Carroll seen on dialysis not very interactive today. Bed scale reading 94.5 kg almost 5 kg above her dry. PMH  ESRD on dialysis  dry weight of 89.5 kg  Type 2 diabetes mellitus with neuropathy, nephropathy  Hypertension  Anemia of chronic kidney disease  History of osteoarthritis  Secondary hyperparathyroid disease of renal origin  History of COVID-19 infection  History of sacral ulcer  Hyperlipidemia  Elevated BMI  History of sarcoidosis  History of gastroesophageal reflux disease  History of renal lithiasis  History of cholecystectomy  History of  x3  Upper endoscopy showing gastritis duodenitis  History of remote tobacco use quit 10 years ago    Physical Exam:    Vitals:    22 0900   BP: (!) 92/28   Pulse: 80   Resp:    Temp:    SpO2:        I/O last 24 hours:  Intake/Output inaccurate    Weight: 95.6    General Appearance:  asleep, in no acute distress  Skin: Large sacral decubitus bandage  Neck:  neck- supple, no mass, non-tender and no bruits  Lungs: Distant breath sounds no wheezing or rhonchi  Heart: Regular rhythm no murmur rub     Abdominal: Abdomen soft, non-tender.  BS normal. No masses,  No organomegaly  Extremities: trace pedal edema  Peripheral Pulses:  +2    DATA:    CBC with Differential:    Lab Results   Component Value Date/Time    WBC 6.2 2022 02:52 AM    RBC 2.68 2022 02:52 AM    HGB 8.9 2022 02:52 AM    HCT 29.5 2022 02:52 AM     2022 02:52 AM    .1 2022 02:52 AM    MCH 33.2 2022 02:52 AM    MCHC 30.2 2022 02:52 AM    RDW 15.0 2022 02:52 AM    NRBC 0.9 2020 02:00 PM    SEGSPCT 71 2013 05:00 AM    BANDSPCT 1 2016 07:37 PM    METASPCT 0.9 05/10/2020 03:45 AM    LYMPHOPCT 17.6 07/10/2022 12:10 PM    PROMYELOPCT 0.9 2020 04:15 AM    MONOPCT 10.8 07/10/2022 12:10 PM    MYELOPCT 1.7 05/11/2020 04:45 AM    BASOPCT 0.9 07/10/2022 12:10 PM    MONOSABS 0.62 07/10/2022 12:10 PM    LYMPHSABS 1.01 07/10/2022 12:10 PM    EOSABS 0.26 07/10/2022 12:10 PM    BASOSABS 0.05 07/10/2022 12:10 PM     BMP:    Lab Results   Component Value Date/Time     07/11/2022 02:52 AM    K 5.6 07/11/2022 02:52 AM    K 6.4 07/08/2022 09:42 PM     07/11/2022 02:52 AM    CO2 26 07/11/2022 02:52 AM    BUN 53 07/11/2022 02:52 AM    LABALBU 3.0 04/13/2022 02:55 AM    LABALBU 4.1 10/12/2011 09:59 AM    CREATININE 6.4 07/11/2022 02:52 AM    CALCIUM 8.8 07/11/2022 02:52 AM    GFRAA 8 07/11/2022 02:52 AM    LABGLOM 6 07/11/2022 02:52 AM    GLUCOSE 95 07/11/2022 02:52 AM    GLUCOSE 149 10/12/2011 09:59 AM     U/A:    Lab Results   Component Value Date/Time    COLORU Yellow 07/08/2022 11:04 PM    PROTEINU 100 07/08/2022 11:04 PM    PHUR 8.5 07/08/2022 11:04 PM    WBCUA >20 07/08/2022 11:04 PM    WBCUA 5-10 08/17/2011 10:30 PM    RBCUA >20 07/08/2022 11:04 PM    RBCUA 0-1 06/26/2013 03:46 PM    YEAST Present 08/11/2020 11:36 AM    BACTERIA MODERATE 07/08/2022 11:04 PM    CLARITYU CLOUDY 07/08/2022 11:04 PM    SPECGRAV 1.015 07/08/2022 11:04 PM    LEUKOCYTESUR LARGE 07/08/2022 11:04 PM    UROBILINOGEN 0.2 07/08/2022 11:04 PM    BILIRUBINUR Negative 07/08/2022 11:04 PM    BILIRUBINUR NEGATIVE 08/17/2011 10:30 PM    BLOODU MODERATE 07/08/2022 11:04 PM    GLUCOSEU 100 07/08/2022 11:04 PM    GLUCOSEU NEGATIVE 08/17/2011 10:30 PM    AMORPHOUS FEW 06/25/2011 08:50 PM            busPIRone  10 mg Oral BID    busPIRone  5 mg Oral Nightly    docusate sodium  100 mg Oral BID    escitalopram  10 mg Oral Daily    ferric citrate  420 mg Oral TID WC    gabapentin  300 mg Oral TID    insulin glargine  10 Units SubCUTAneous Nightly    insulin lispro  10 Units SubCUTAneous TID AC    linaCLOtide  72 mcg Oral QAM AC    metoprolol succinate  25 mg Oral Daily    midodrine  15 mg Oral Once per day on Mon Wed Fri    polyethylene glycol 17 g Oral Daily    senna  2 tablet Oral BID    vitamin D  2,000 Units Oral Daily    sodium chloride flush  5-40 mL IntraVENous 2 times per day    heparin (porcine)  5,000 Units SubCUTAneous 3 times per day    alogliptin  6.25 mg Oral Daily      sodium chloride      dextrose       bisacodyl, magnesium hydroxide, traZODone, sodium chloride flush, sodium chloride, acetaminophen **OR** acetaminophen, glucose, dextrose bolus **OR** dextrose bolus, glucagon (rDNA), dextrose, oxyCODONE-acetaminophen **AND** oxyCODONE, prochlorperazine    IMPRESSION/RECOMMENDATIONS:      End-stage renal disease on dialysis presently   Hyperkalemia mild   History of sacral decubitus as per ID  Anemia of chronic disease continue ARUN therapy  Secondary hyperparathyroid disease of renal origin phosphate worsening hospital does not carry ferrous citrate unclear patient's family has brought meds from home  Will reduce dose of gabapentin some of her weakness may be secondary to renal accumulation of Neurontin      Sergio Alarcon MD  7/11/2022 9:15 AM

## 2022-07-11 NOTE — PROGRESS NOTES
Subjective: The patient is awake and alert. No problems overnight. Denies chest pain, angina, and dyspnea. Denies abdominal pain. Tolerating diet. No nausea or vomiting. Await PM&R consult. Objective:    /67   Pulse 92   Temp 97.6 °F (36.4 °C) (Oral)   Resp 16   Ht 5' 1\" (1.549 m)   Wt 210 lb 11.2 oz (95.6 kg)   SpO2 99%   BMI 39.81 kg/m²     Heart:  RRR, no murmurs, gallops, or rubs.   Lungs:  CTA bilaterally, no wheeze, rales or rhonchi  Abd: bowel sounds present, nontender, nondistended, no masses  Extrem:  No clubbing, cyanosis, or edema    CBC with Differential:    Lab Results   Component Value Date/Time    WBC 6.2 07/11/2022 02:52 AM    RBC 2.68 07/11/2022 02:52 AM    HGB 8.9 07/11/2022 02:52 AM    HCT 29.5 07/11/2022 02:52 AM     07/11/2022 02:52 AM    .1 07/11/2022 02:52 AM    MCH 33.2 07/11/2022 02:52 AM    MCHC 30.2 07/11/2022 02:52 AM    RDW 15.0 07/11/2022 02:52 AM    NRBC 0.9 05/25/2020 02:00 PM    SEGSPCT 71 08/16/2013 05:00 AM    BANDSPCT 1 03/29/2016 07:37 PM    METASPCT 0.9 05/10/2020 03:45 AM    LYMPHOPCT 17.6 07/10/2022 12:10 PM    PROMYELOPCT 0.9 05/09/2020 04:15 AM    MONOPCT 10.8 07/10/2022 12:10 PM    MYELOPCT 1.7 05/11/2020 04:45 AM    BASOPCT 0.9 07/10/2022 12:10 PM    MONOSABS 0.62 07/10/2022 12:10 PM    LYMPHSABS 1.01 07/10/2022 12:10 PM    EOSABS 0.26 07/10/2022 12:10 PM    BASOSABS 0.05 07/10/2022 12:10 PM     CMP:    Lab Results   Component Value Date/Time     07/11/2022 02:52 AM    K 5.6 07/11/2022 02:52 AM    K 6.4 07/08/2022 09:42 PM     07/11/2022 02:52 AM    CO2 26 07/11/2022 02:52 AM    BUN 53 07/11/2022 02:52 AM    CREATININE 6.4 07/11/2022 02:52 AM    GFRAA 8 07/11/2022 02:52 AM    LABGLOM 6 07/11/2022 02:52 AM    GLUCOSE 95 07/11/2022 02:52 AM    GLUCOSE 149 10/12/2011 09:59 AM    PROT 6.0 04/13/2022 02:55 AM    LABALBU 3.0 04/13/2022 02:55 AM    LABALBU 4.1 10/12/2011 09:59 AM    CALCIUM 8.8 07/11/2022 02:52 AM    BILITOT <0.2 04/13/2022 02:55 AM    ALKPHOS 115 04/13/2022 02:55 AM     04/13/2022 02:55 AM    ALT 34 04/13/2022 02:55 AM        Assessment:    Patient Active Problem List   Diagnosis    Neurologic gait dysfunction    Renal failure, acute on chronic (HCC)    Diabetes mellitus ([facility]    Hypertension    Arthritis    Kidney disease    Knee problem    Anemia due to stage 3 chronic kidney disease    Primary osteoarthritis of both knees    Acute on chronic renal insufficiency    Acute kidney injury superimposed on CKD (HCC)    Acute renal failure superimposed on chronic kidney disease, on chronic dialysis ([facility]    Moderate protein-calorie malnutrition (HCC)    Hyperkalemia    PERLA (acute kidney injury) ([facility]    Stage 5 chronic kidney disease on chronic dialysis ([facility])    Pressure injury of sacral region, stage 4 (HCC)    Pressure injury of right hip, stage 3 (HCC)    Syncope and collapse    Unresponsive episode    Left bundle branch block    Pure hypercholesterolemia    Moderate obesity    Sepsis ([facility]    Failure to thrive in adult       Plan:  PM&R consult  D/C planning        Milton Tyson MD  7:44 AM  7/11/2022

## 2022-07-11 NOTE — CARE COORDINATION
Transition of Care-[facility] is unable to accept-reviewed SNF list with patient again. She is agreeable to [facility], second choice is [facility]. Referral send to [facility], awaiting bed acceptance.     Ana BRANTLEY, RN  [facility]

## 2022-07-11 NOTE — CARE COORDINATION
Social Work/Discharge Planning:  Chart reviewed. PM&R consult noted. Referral made to Ashlee with [id number] and she states Dr. Gabriel Razo will evaluate. Will continue to follow.   Electronically signed by SHANTA Levin on 7/11/2022 at 10:13 AM

## 2022-07-11 NOTE — CARE COORDINATION
Transition of Care-Patient was not accepted to Acute Rehab @ [facility] ([facility]). Discussed choices with patient and she is adamant she does not want to go to a skilled nursing facility, provided SNF list. Patient requested [facility], will make a referral and wait to hear back. The Plan for Transition of Care is related to the following treatment goals: Acute Rehab or Skilled Nursing    The Patient  was provided with a choice of provider and agrees   with the discharge plan. [x] Yes [] No    Freedom of choice list was provided with basic dialogue that supports the patient's individualized plan of care/goals, treatment preferences and shares the quality data associated with the providers.  [x] Yes [] No      Esperanza WOODN, RN  [facility]

## 2022-07-11 NOTE — PROGRESS NOTES
Chart reviewed with Dr. Heri Cuadra. Patient is not a candidate for ARU because she does not have an appropriate qualifying diagnosis for acute rehab. Per Dr. Heri Cuadra, recommend [facility] at discharge.     Stuart Marti RN, Pre-screener for Acute Rehab  P: [phone]

## 2022-07-11 NOTE — PROGRESS NOTES
Consult received. Dr. Tiffany Morales to evaluate patient's appropriateness for ARU. OT evaluation is pending.     Quita Corrales RN, Pre-screener for Acute Rehab  P: [phone]

## 2022-07-11 NOTE — FLOWSHEET NOTE
07/11/22 1125   Vital Signs   BP (!) 132/56   Temp 98 °F (36.7 °C)   Heart Rate 90   Resp 16   Weight 205 lb 7.5 oz (93.2 kg)   Weight Method Bed scale   Percent Weight Change -1.38   Pain Assessment   Pain Assessment None - Denies Pain   Post-Hemodialysis Assessment   Post-Treatment Procedures Blood returned; Access bleeding time < 10 minutes   Machine Disinfection Process Exterior Machine Disinfection   Rinseback Volume (ml) 300 ml   Blood Volume Processed (Liters) 72.3 l/min   Dialyzer Clearance Lightly streaked   Duration of Treatment (minutes) 180 minutes   Heparin Amount Administered During Treatment (mL) 0 units   Hemodialysis Intake (ml) 300 ml   Hemodialysis Output (ml) 1500 ml   NET Removed (ml) 1200   Tolerated Treatment Good   Patient Response to Treatment Tolerated well. Vitals stable post.   1200 removed.    Bilateral Breath Sounds Clear   Edema Generalized   Edema Generalized +1   Patient Disposition Return to room

## 2022-07-12 PROBLEM — R55 SYNCOPE AND COLLAPSE: Status: RESOLVED | Noted: 2022-02-07 | Resolved: 2022-07-12

## 2022-07-12 PROBLEM — A41.9 SEPSIS (HCC): Status: RESOLVED | Noted: 2022-04-12 | Resolved: 2022-07-12

## 2022-07-12 LAB
HEPATITIS B SURFACE ANTIGEN INTERPRETATION: NORMAL
METER GLUCOSE: 108 MG/DL (ref 74–99)
METER GLUCOSE: 143 MG/DL (ref 74–99)
METER GLUCOSE: 145 MG/DL (ref 74–99)
METER GLUCOSE: 190 MG/DL (ref 74–99)
METER GLUCOSE: 72 MG/DL (ref 74–99)

## 2022-07-12 PROCEDURE — 82962 GLUCOSE BLOOD TEST: CPT

## 2022-07-12 PROCEDURE — 6360000002 HC RX W HCPCS: Performed by: FAMILY MEDICINE

## 2022-07-12 PROCEDURE — 6370000000 HC RX 637 (ALT 250 FOR IP): Performed by: INTERNAL MEDICINE

## 2022-07-12 PROCEDURE — G0378 HOSPITAL OBSERVATION PER HR: HCPCS

## 2022-07-12 PROCEDURE — 2580000003 HC RX 258: Performed by: FAMILY MEDICINE

## 2022-07-12 PROCEDURE — 97530 THERAPEUTIC ACTIVITIES: CPT

## 2022-07-12 PROCEDURE — 2060000000 HC ICU INTERMEDIATE R&B

## 2022-07-12 PROCEDURE — 6370000000 HC RX 637 (ALT 250 FOR IP): Performed by: FAMILY MEDICINE

## 2022-07-12 PROCEDURE — 96372 THER/PROPH/DIAG INJ SC/IM: CPT

## 2022-07-12 PROCEDURE — 97165 OT EVAL LOW COMPLEX 30 MIN: CPT

## 2022-07-12 RX ORDER — PROCHLORPERAZINE MALEATE 5 MG/1
5 TABLET ORAL EVERY 6 HOURS PRN
Qty: 120 TABLET | Refills: 3 | Status: ON HOLD | DISCHARGE
Start: 2022-07-12

## 2022-07-12 RX ORDER — INSULIN GLARGINE 100 [IU]/ML
6 INJECTION, SOLUTION SUBCUTANEOUS NIGHTLY
Qty: 10 ML | Refills: 3 | Status: ON HOLD | DISCHARGE
Start: 2022-07-12

## 2022-07-12 RX ORDER — INSULIN GLARGINE 100 [IU]/ML
6 INJECTION, SOLUTION SUBCUTANEOUS NIGHTLY
Status: DISCONTINUED | OUTPATIENT
Start: 2022-07-12 | End: 2022-07-14 | Stop reason: HOSPADM

## 2022-07-12 RX ADMIN — BUSPIRONE HYDROCHLORIDE 10 MG: 10 TABLET ORAL at 14:32

## 2022-07-12 RX ADMIN — ALOGLIPTIN 6.25 MG: 6.25 TABLET, FILM COATED ORAL at 10:33

## 2022-07-12 RX ADMIN — ACETAMINOPHEN 650 MG: 325 TABLET ORAL at 19:02

## 2022-07-12 RX ADMIN — ESCITALOPRAM OXALATE 10 MG: 10 TABLET ORAL at 10:33

## 2022-07-12 RX ADMIN — GABAPENTIN 100 MG: 100 CAPSULE ORAL at 14:32

## 2022-07-12 RX ADMIN — GABAPENTIN 100 MG: 100 CAPSULE ORAL at 20:53

## 2022-07-12 RX ADMIN — OXYCODONE 2.5 MG: 5 TABLET ORAL at 14:33

## 2022-07-12 RX ADMIN — METOPROLOL SUCCINATE 25 MG: 25 TABLET, EXTENDED RELEASE ORAL at 10:33

## 2022-07-12 RX ADMIN — HEPARIN SODIUM 5000 UNITS: 10000 INJECTION INTRAVENOUS; SUBCUTANEOUS at 20:53

## 2022-07-12 RX ADMIN — HEPARIN SODIUM 5000 UNITS: 10000 INJECTION INTRAVENOUS; SUBCUTANEOUS at 06:16

## 2022-07-12 RX ADMIN — BUSPIRONE HYDROCHLORIDE 5 MG: 5 TABLET ORAL at 20:53

## 2022-07-12 RX ADMIN — Medication 2000 UNITS: at 10:33

## 2022-07-12 RX ADMIN — GABAPENTIN 100 MG: 100 CAPSULE ORAL at 10:33

## 2022-07-12 RX ADMIN — Medication 10 ML: at 20:53

## 2022-07-12 RX ADMIN — DOCUSATE SODIUM 100 MG: 100 CAPSULE, LIQUID FILLED ORAL at 10:33

## 2022-07-12 RX ADMIN — Medication 10 ML: at 10:36

## 2022-07-12 RX ADMIN — INSULIN GLARGINE 6 UNITS: 100 INJECTION, SOLUTION SUBCUTANEOUS at 21:04

## 2022-07-12 RX ADMIN — HEPARIN SODIUM 5000 UNITS: 10000 INJECTION INTRAVENOUS; SUBCUTANEOUS at 15:17

## 2022-07-12 ASSESSMENT — PAIN SCALES - GENERAL: PAINLEVEL_OUTOF10: 0

## 2022-07-12 NOTE — PROGRESS NOTES
Occupational Therapy    OCCUPATIONAL THERAPY INITIAL EVALUATION     [address] [place], [place]         W[place]VA:[address]                                                  Patient Name: Zachery Guardado    MRN: [id number]    : 1950    Room: [address] [place] [address]      Evaluating OT: Juaquin Wong OTR/L   [id number]      Referring Clarence Combs MD    Specific Provider Orders/Date:OT eval and treat 2022      Diagnosis:  Failure to thrive in adult [R62.7]  Acute renal failure superimposed on chronic kidney disease, on chronic dialysis, unspecified acute renal failure type ([facility] [address] [N17.9, N18.9, Z99.2]     Pertinent Medical History: anxiety, COVID 19, dialysis, muscle weakness      Precautions:  Fall Risk, alarm, coccyx wound     Assessment of current deficits    [x] Functional mobility  [x]ADLs  [x] Strength               [x]Cognition    [x] Functional transfers   [x] IADLs         [x] Safety Awareness   [x]Endurance    [] Fine Coordination              [x] Balance      [] Vision/perception   []Sensation     []Gross Motor Coordination  [] ROM  [] Delirium                   [] Motor Control     OT PLAN OF CARE   OT POC based on physician orders, patient diagnosis and results of clinical assessment    Frequency/Duration  2-4 days/wk for 2 weeks PRN   Specific OT Treatment Interventions to include:   ADL retraining/adapted techniques and AE recommendations to increase functional independence within precautions                    Energy conservation techniques to improve tolerance for selfcare routine   Functional transfer/mobility training/DME recommendations for increased independence, safety and fall prevention         Patient/family education to increase safety and functional independence             Environmental modifications for safe mobility and completion of ADLs                             Therapeutic activity to improve functional performance during FMC/dexterity noted during ADL tasks       [facility] WFL good  and wfl FMC/dexterity noted during ADL tasks       Hearing: WFL   Sensation:  No c/o numbness or tingling   Tone: WFL   Edema: none observed     Comments: Upon arrival patient lying in bed . At end of session, patient returned to bed  with call light and phone within reach, all lines and tubes intact. *ALARM ON     Overall patient demonstrated  decreased independence and safety during completion of ADL/functional transfer/mobility tasks. Pt would benefit from continued skilled OT to increase safety and independence with completion of ADL/IADL tasks for functional independence and quality of life. Comments/Treatment:     Patient practiced and was instructed on following during evaluation and OT session:          -Bed mobility - technique and safety         -Tranfers - hand placement, tech and safety         -Mobility - safety, and tech with  a device - patient unable to initiate steps,         knee buckling         -ADLs - tech, AE if appropriate/needed           -Education:  , importance of OOB activity and participating in ADLs,         relaxation tech            Rehab Potential: good for established goals     Patient / Family Goal: rehab       Patient and/or family were instructed on functional diagnosis, prognosis/goals and OT plan of care. Demonstrated good  understanding.      Eval Complexity: Low    Time In: 1140  Time Out: 1205  Total Treatment Time: 12    Min Units   OT Eval Low 97165 x  1   OT Eval Medium 42547      OT Eval High 04883      OT Re-Eval [id number]       Therapeutic Ex 93841      Therapeutic Activities 47876  12  1   ADL/Self Care 74573       Orthotic Management 67608       Manual 39743     Neuro Re-Ed 11748       Non-Billable Time          Evaluation Time additionally includes thorough review of current medical information, gathering information on past medical history/social history and prior level of function, interpretation of standardized testing/informal observation of tasks, assessment of data and development of plan of care and goals.             Ebenezer Gannon  OTR/L  OT [id number]

## 2022-07-12 NOTE — PROGRESS NOTES
Department of Internal Medicine  Nephrology Attending Progress Note        SUBJECTIVE:  Mrs Dorothy Myers much more awake today. Denies dysuria, shortness of breath, states she has been having a hard time sleeping at the nursing facility . Bed scale reading 95.5 kg almost 5 kg above her dry. Blood pressures improved    PMH  ESRD on dialysis  dry weight of 89.5 kg  Type 2 diabetes mellitus with neuropathy, nephropathy  Hypertension  Anemia of chronic kidney disease  History of osteoarthritis  Secondary hyperparathyroid disease of renal origin  History of COVID-19 infection  History of sacral ulcer  Hyperlipidemia  Elevated BMI  History of sarcoidosis  History of gastroesophageal reflux disease  History of renal lithiasis  History of cholecystectomy  History of  x3  Upper endoscopy showing gastritis duodenitis  History of remote tobacco use quit 10 years ago    Physical Exam:    Vitals:    22 0808   BP: (!) 143/42   Pulse: 69   Resp: 18   Temp: 98.1 °F (36.7 °C)   SpO2: 98%       I/O last 24 hours:  Intake/Output inaccurate    Weight: 95.6    General Appearance:  asleep, in no acute distress  Skin: Large sacral decubitus bandage  Neck:  neck- supple, no mass, non-tender and no bruits  Lungs: Distant breath sounds no wheezing or rhonchi  Heart: Regular rhythm no murmur rub     Abdominal: Abdomen soft, non-tender.  BS normal. No masses,  No organomegaly  Extremities: trace pedal edema  Peripheral Pulses:  +2    DATA:    CBC with Differential:    Lab Results   Component Value Date/Time    WBC 6.2 2022 02:52 AM    RBC 2.68 2022 02:52 AM    HGB 8.9 2022 02:52 AM    HCT 29.5 2022 02:52 AM     2022 02:52 AM    .1 2022 02:52 AM    MCH 33.2 2022 02:52 AM    MCHC 30.2 2022 02:52 AM    RDW 15.0 2022 02:52 AM    NRBC 0.9 2020 02:00 PM    SEGSPCT 71 2013 05:00 AM    BANDSPCT 1 2016 07:37 PM    METASPCT 0.9 05/10/2020 03:45 AM    LYMPHOPCT 17.6 07/10/2022 12:10 PM    PROMYELOPCT 0.9 05/09/2020 04:15 AM    MONOPCT 10.8 07/10/2022 12:10 PM    MYELOPCT 1.7 05/11/2020 04:45 AM    BASOPCT 0.9 07/10/2022 12:10 PM    MONOSABS 0.62 07/10/2022 12:10 PM    LYMPHSABS 1.01 07/10/2022 12:10 PM    EOSABS 0.26 07/10/2022 12:10 PM    BASOSABS 0.05 07/10/2022 12:10 PM     BMP:    Lab Results   Component Value Date/Time     07/11/2022 02:52 AM    K 5.6 07/11/2022 02:52 AM    K 6.4 07/08/2022 09:42 PM     07/11/2022 02:52 AM    CO2 26 07/11/2022 02:52 AM    BUN 53 07/11/2022 02:52 AM    LABALBU 3.0 04/13/2022 02:55 AM    LABALBU 4.1 10/12/2011 09:59 AM    CREATININE 6.4 07/11/2022 02:52 AM    CALCIUM 8.8 07/11/2022 02:52 AM    GFRAA 8 07/11/2022 02:52 AM    LABGLOM 6 07/11/2022 02:52 AM    GLUCOSE 95 07/11/2022 02:52 AM    GLUCOSE 149 10/12/2011 09:59 AM     U/A:    Lab Results   Component Value Date/Time    COLORU Yellow 07/08/2022 11:04 PM    PROTEINU 100 07/08/2022 11:04 PM    PHUR 8.5 07/08/2022 11:04 PM    WBCUA >20 07/08/2022 11:04 PM    WBCUA 5-10 08/17/2011 10:30 PM    RBCUA >20 07/08/2022 11:04 PM    RBCUA 0-1 06/26/2013 03:46 PM    YEAST Present 08/11/2020 11:36 AM    BACTERIA MODERATE 07/08/2022 11:04 PM    CLARITYU CLOUDY 07/08/2022 11:04 PM    SPECGRAV 1.015 07/08/2022 11:04 PM    LEUKOCYTESUR LARGE 07/08/2022 11:04 PM    UROBILINOGEN 0.2 07/08/2022 11:04 PM    BILIRUBINUR Negative 07/08/2022 11:04 PM    BILIRUBINUR NEGATIVE 08/17/2011 10:30 PM    BLOODU MODERATE 07/08/2022 11:04 PM    GLUCOSEU 100 07/08/2022 11:04 PM    GLUCOSEU NEGATIVE 08/17/2011 10:30 PM    AMORPHOUS FEW 06/25/2011 08:50 PM            insulin glargine  6 Units SubCUTAneous Nightly    epoetin derek-epbx  10,000 Units SubCUTAneous Once per day on Mon Wed Fri    gabapentin  100 mg Oral TID    busPIRone  10 mg Oral BID    busPIRone  5 mg Oral Nightly    docusate sodium  100 mg Oral BID    escitalopram  10 mg Oral Daily    ferric citrate  420 mg Oral TID WC    linaCLOtide  72 mcg Oral QAM AC    metoprolol succinate  25 mg Oral Daily    midodrine  15 mg Oral Once per day on Mon Wed Fri    polyethylene glycol  17 g Oral Daily    senna  2 tablet Oral BID    vitamin D  2,000 Units Oral Daily    sodium chloride flush  5-40 mL IntraVENous 2 times per day    heparin (porcine)  5,000 Units SubCUTAneous 3 times per day    alogliptin  6.25 mg Oral Daily      sodium chloride      dextrose       bisacodyl, magnesium hydroxide, traZODone, sodium chloride flush, sodium chloride, acetaminophen **OR** acetaminophen, glucose, dextrose bolus **OR** dextrose bolus, glucagon (rDNA), dextrose, oxyCODONE-acetaminophen **AND** oxyCODONE, prochlorperazine    IMPRESSION/RECOMMENDATIONS:      End-stage renal disease continue dialysis Monday Wednesday Friday   hyperkalemia mild .   No repeat labs  History of sacral decubitus as per ID  Anemia of chronic disease continue ARUN therapy  Secondary hyperparathyroid disease of renal origin phosphate worsening hospital does not carry ferrous citrate unclear patient's family has brought meds from home  Seems more awake since reduction in  gabapentin some of her weakness may be secondary to renal accumulation of Neurontin      Michael Baires MD  7/12/2022 12:00 PM

## 2022-07-12 NOTE — CARE COORDINATION
Transition of Care-Demi has accepted, waiting on pre-cert, will need negative covid test day of discharge. Call placed to [facility] ([phone]) to update, will require update on day of discharge. CIELO GARCIA, transportation form/envelope in soft chart.     Darryle Hutching BSN, RN  [facility]

## 2022-07-12 NOTE — PROGRESS NOTES
Subjective: The patient is awake and alert. No problems overnight. Denies chest pain, angina, and dyspnea. Denies abdominal pain. Tolerating diet. No nausea or vomiting. ARU denied. Objective:    BP (!) 143/65   Pulse 70   Temp 97.9 °F (36.6 °C) (Oral)   Resp 16   Ht 5' 1\" (1.549 m)   Wt 212 lb 15.4 oz (96.6 kg)   SpO2 98%   BMI 40.24 kg/m²     Heart:  RRR, no murmurs, gallops, or rubs.   Lungs:  CTA bilaterally, no wheeze, rales or rhonchi  Abd: bowel sounds present, nontender, nondistended, no masses  Extrem:  No clubbing, cyanosis, or edema    CBC with Differential:    Lab Results   Component Value Date/Time    WBC 6.2 07/11/2022 02:52 AM    RBC 2.68 07/11/2022 02:52 AM    HGB 8.9 07/11/2022 02:52 AM    HCT 29.5 07/11/2022 02:52 AM     07/11/2022 02:52 AM    .1 07/11/2022 02:52 AM    MCH 33.2 07/11/2022 02:52 AM    MCHC 30.2 07/11/2022 02:52 AM    RDW 15.0 07/11/2022 02:52 AM    NRBC 0.9 05/25/2020 02:00 PM    SEGSPCT 71 08/16/2013 05:00 AM    BANDSPCT 1 03/29/2016 07:37 PM    METASPCT 0.9 05/10/2020 03:45 AM    LYMPHOPCT 17.6 07/10/2022 12:10 PM    PROMYELOPCT 0.9 05/09/2020 04:15 AM    MONOPCT 10.8 07/10/2022 12:10 PM    MYELOPCT 1.7 05/11/2020 04:45 AM    BASOPCT 0.9 07/10/2022 12:10 PM    MONOSABS 0.62 07/10/2022 12:10 PM    LYMPHSABS 1.01 07/10/2022 12:10 PM    EOSABS 0.26 07/10/2022 12:10 PM    BASOSABS 0.05 07/10/2022 12:10 PM     CMP:    Lab Results   Component Value Date/Time     07/11/2022 02:52 AM    K 5.6 07/11/2022 02:52 AM    K 6.4 07/08/2022 09:42 PM     07/11/2022 02:52 AM    CO2 26 07/11/2022 02:52 AM    BUN 53 07/11/2022 02:52 AM    CREATININE 6.4 07/11/2022 02:52 AM    GFRAA 8 07/11/2022 02:52 AM    LABGLOM 6 07/11/2022 02:52 AM    GLUCOSE 95 07/11/2022 02:52 AM    GLUCOSE 149 10/12/2011 09:59 AM    PROT 6.0 04/13/2022 02:55 AM    LABALBU 3.0 04/13/2022 02:55 AM    LABALBU 4.1 10/12/2011 09:59 AM    CALCIUM 8.8 07/11/2022 02:52 AM    BILITOT <0.2 04/13/2022 02:55 AM    ALKPHOS 115 04/13/2022 02:55 AM     04/13/2022 02:55 AM    ALT 34 04/13/2022 02:55 AM        Assessment:    Patient Active Problem List   Diagnosis    Neurologic gait dysfunction    Renal failure, acute on chronic (HCC)    Diabetes mellitus ([facility] [address]    Hypertension    Arthritis    Kidney disease    Knee problem    Anemia due to stage 3 chronic kidney disease    Primary osteoarthritis of both knees    Acute on chronic renal insufficiency    Acute kidney injury superimposed on CKD (HCC)    Acute renal failure superimposed on chronic kidney disease, on chronic dialysis (HCC)    Moderate protein-calorie malnutrition (HCC)    Hyperkalemia    PERLA [facility] kidney [facility]) ([facility])    Stage 5 chronic kidney disease on chronic dialysis (HCC)    Pressure injury of sacral region, stage 4 (HCC)    Pressure injury of right hip, stage 3 (HCC)    Unresponsive episode    Left bundle branch block    Pure hypercholesterolemia    Moderate obesity    Failure to thrive in adult       Plan:  Discharge        Joy Stone MD  7:04 AM  7/12/2022

## 2022-07-12 NOTE — PROGRESS NOTES
Physical Therapy  Facility/Department: [facility]  Physical Therapy Treatment Note    Name: Tracy Doan  : 1950  MRN: [id number]  Date of Service: 2022        Patient Diagnosis(es): The primary encounter diagnosis was Hyperkalemia. Diagnoses of Other fatigue, Failure to thrive in adult, and Anxiety state were also pertinent to this visit. Past Medical History:  has a past medical history of Anemia in chronic kidney disease, Anxiety and depression, Arthritis, Chronic pain syndrome, COVID-19, COVID-19, Diabetes mellitus ([address] Dysphagia, oral phase, GERD (gastroesophageal reflux disease), Hemodialysis patient ([address] Hyperkalemia, Hypertension, Hypertensive kidney disease with stage 4 chronic kidney disease ([address] Insomnia, Kidney stones, MDD (major depressive disorder), Moderate protein-calorie malnutrition ([address] Morbid obesity ([address] Muscle weakness (generalized), Obesity, Pressure ulcer of sacral region, Sacral pressure ulcer, Unspecified osteoarthritis, unspecified site, and Weakness generalized. Past Surgical History:  has a past surgical history that includes Foot surgery (2009 ); Cystocopy (2011 );  section (x3); Upper gastrointestinal endoscopy (2.18.15); Cholecystectomy (3/31/16); Abdomen surgery (N/A, 2020); Upper gastrointestinal endoscopy (N/A, 2020); Upper gastrointestinal endoscopy (N/A, 2020); vascular surgery (N/A, 2021); Dialysis fistula creation (Left, 2021); and Dialysis fistula creation (Right, 2021). Evaluating Therapist: Carolina Whitmore PT     Referring Provider:  Kishor Baum MD    PT order : PT eval and treat     Room #: 234   DIAGNOSIS:  Failure to thrive   Additional Pertinent History:recently home after in SNF x 2 years. PRECAUTIONS:  falls    Social:  Pt lives with  Son  in a  2  floor plan  With stair glide to second floor, but pt reports battery on lift is dead. 1+1  steps  to enter.   Prior to admission pt walked with ww short distances or uses a w/c      Initial Evaluation  Date: 7/10/2022  Treatment  7/12/2022    Short Term/ Long Term   Goals   Was pt agreeable to Eval/treatment? yes  yes    Does pt have pain?  coccyx wound  Coccyx discomfort    Bed Mobility  Rolling:  Min assist for full 1/4 roll   Supine to sit:  SBA   Sit to supine: mod assist   Scooting:  Max assist in supine  Supine to sit: SBA  Scooting: Mod A seated to EOB  SBA to min assist    Transfers Sit to stand: Mod assist   Stand to sit:  Mod assist   Stand pivot:  NT  Sit to stand: Mod A  Stand to sit: Mod A  Stand Pivot: Mod A using [facility] for support  CGA    Ambulation     3 feet forward and backward  with ww  with  Min assist progressing to mod assist  NT  25  feet with  ww  with  CGA        Stair negotiation: ascended and descended NT   TBA    LE ROM  Grossly WFL      LE strength  3+ to 4-/ 5   4/ 5    AM- PAC RAW score   13/ 24 13/24          Pt is alert and able to follow all instruction, states her legs feel weak, states not sure she can walk any distance  Balance: poor during pivot transfer using [facility] for support    Pt performed therapeutic exercise of the following: NT    Patient education/treatment  Pt was educated on UE usage to promote transfer safety, B UE WB and staying within [facility] base of support during pivot transfer promoting [facility] mechanics    Patient response to education:   Pt verbalized understanding Pt demonstrated skill Pt requires further education in this area   yes With instruction yes     ASSESSMENT:   Comments: Pt found in bed, agreed to rx. Pt sat EOB SBA for balance. Pt stood off EOB using [facility] for support approx 20 seconds with Mod A. After a brief rest, pivot bed to chair using [facility] for support Mod A. Pt displayed much LE instability with knee buckling at times. Pt unsafe to transfer alone.       Pt was left in a bedside chair with call light in reach, waffle cushion in place    Time in 1426   Time out 1440   Total Treatment Time 14 minutes

## 2022-07-12 NOTE — DISCHARGE INSTR - COC
Continuity of Care Form    Patient Name: Donna Whittaker   :  1950  MRN:  [id number]    Admit date:  2022  Discharge date:  ***    Code Status Order: DNR-CCA   Advance Directives:      Admitting Physician:  Nidia Paez MD  PCP: Nidia Paez MD    Discharging Nurse: [facility] Unit/Room#: 0384/3767-K  Discharging Unit Phone Number: ***    Emergency Contact:   Extended Emergency Contact Information  Primary Emergency Contact: MaynorJohnson  Address: [address] [facility] Phone: [phone]  Mobile Phone: [phone]  Relation: Child   needed? No  Secondary Emergency Contact: Yamileth Reilly [facility] [address] [facility] Phone: [phone]  Mobile Phone: [phone]  Relation: Child   needed? No    Past Surgical History:  Past Surgical History:   Procedure Laterality Date    ABDOMEN SURGERY N/A 2020    SACRAL WOUND DEBRIDEMENT CALL   WITH TIME AVAIL AM performed by Shruti Prasad MD at [address] [email or web address]    CHOLECYSTECTOMY  3/31/16    Laparoscopic-Dr. Karen Colindres       for kidney stones    DIALYSIS FISTULA CREATION Left 2021    INSERTION FISTULA LEFT ARM performed by Clau Ball MD at [address] Right 2021    REVISION AV FISTULA LEFT ARM performed by Clau Ball MD at [place] [address]     right     UPPER GASTROINTESTINAL ENDOSCOPY  2.18.15    Dr. Lorena Govea Findings: Mild Gastrits and Duodenitis, 2cm Hiatal Hernia    UPPER GASTROINTESTINAL ENDOSCOPY N/A 2020    EGD CONTROL HEMORRHAGE performed by Shruti Prasad MD at [address] [facility] N/A 2020    EGD BEDSIDE performed by Keith Rice MD at [address] N/A 2021    INSERTION TUNNELED DIALYSIS CATHETER performed by Clau Ball MD at [address]       Immunization History:   Immunization History Administered Date(s) Administered    COVID-19, PFIZER PURPLE top, DILUTE for use, (age 15 y+), 30mcg/0.3mL 01/05/2021, 01/26/2021       Active Problems:  Patient Active Problem List   Diagnosis Code    Neurologic gait dysfunction R26.9    Renal failure, acute on chronic (HCC) N17.9, N18.9    Diabetes mellitus ([address] E11.9    Hypertension I10    Arthritis M19.90    Kidney disease N28.9    Knee problem M25.9    Anemia due to stage 3 chronic kidney disease N18.30, D63.1    Primary osteoarthritis of both knees M17.0    Acute on chronic renal insufficiency N28.9, N18.9    Acute kidney injury superimposed on CKD ([address] N17.9, N18.9    Acute renal failure superimposed on chronic kidney disease, on chronic dialysis ([address] N17.9, N18.9, Z99.2    Moderate protein-calorie malnutrition (HCC) E44.0    Hyperkalemia E87.5    PERLA (acute kidney injury) ([address] N17.9    Stage 5 chronic kidney disease on chronic dialysis ([address] N18.6, Z99.2    Pressure injury of sacral region, stage 4 (HCC) L89.154    Pressure injury of right hip, stage 3 ([address] L89.213    Unresponsive episode R41.89    Left bundle branch block I44.7    Pure hypercholesterolemia E78.00    Moderate obesity E66.8    Failure to thrive in adult R62.7       Isolation/Infection:   Isolation            No Isolation          Patient Infection Status       Infection Onset Added Last Indicated Last Indicated By Review Planned Expiration Resolved Resolved By    None active    Resolved    COVID-19 (Rule Out) 07/08/22 07/08/22 07/08/22 COVID-19, Rapid (Ordered)   07/08/22 Rule-Out Test Resulted    COVID-19 (Rule Out) 04/11/22 04/11/22 04/12/22 COVID-19, Rapid (Ordered)   04/12/22 Rule-Out Test Resulted    COVID-19 (Rule Out) 01/07/21 01/07/21 01/07/21 COVID-19 (Ordered)   01/07/21 Rule-Out Test Resulted    COVID-19 (Rule Out) 10/09/20 10/09/20 10/09/20 COVID-19 (Ordered)   10/09/20 Rule-Out Test Resulted    COVID-19 (Rule Out) 05/26/20 05/26/20 05/26/20 COVID-19 (Ordered)   08/28/20     VRE 20 Culture, Wound   10/12/20 Debra Tejeda RN    Enterococcus faecium Wound-Coccyx 20    MDRO (multi-drug resistant organism) 20 Culture, Wound   10/12/20 Debra Tejeda RN    COVID-19 (Rule Out) 20 COVID-19 (Ordered)   20 Rule-Out Test Resulted    DETECTED 2020    COVID-19 (Rule Out) 05/14/20 05/15/20 05/14/20 Covid-19 Ambulatory (Ordered)   20 Rule-Out Test Resulted    Detected 2020    COVID-19 (Rule Out) 20 COVID-19 (Ordered)   20 Rule-Out Test Resulted    DETECTED 2020    C-diff Rule Out 20 CLOSTRIDIUM DIFFICILE EIA (Ordered)   20 Maximo Murphy RN    Does not meet criteria    MRSA 20 Culture, Wound   20 Shelbie Gibbs RN    Wound buttock 5/3/20    COVID-19 20 COVID-19   20     Detected 2020, 2020, 2020, 5/3/2020    COVID-19 (Rule Out) 20 COVID-19 (Ordered)   20 Rule-Out Test Resulted    DETECTED 5/3/2020            Nurse Assessment:  Last Vital Signs: BP (!) 143/65   Pulse 70   Temp 97.9 °F (36.6 °C) (Oral)   Resp 16   Ht 5' 1\" (1.549 m)   Wt 212 lb 15.4 oz (96.6 kg)   SpO2 98%   BMI 40.24 kg/m²     Last documented pain score (0-10 scale): Pain Level: 0  Last Weight:   Wt Readings from Last 1 Encounters:   22 212 lb 15.4 oz (96.6 kg)     Mental Status:  oriented, alert, coherent, logical, and thought processes intact    IV Access:  - None    Nursing Mobility/ADLs:  Walking   Assisted  Transfer  Assisted  Bathing  Assisted  Dressing  Assisted  Toileting  Assisted  Feeding  Independent  Med Admin  Assisted  Med Delivery   whole    Wound Care Documentation and Therapy:  Wound 21 Buttocks Right #2 (Active)   Number of days: 334       Wound 21 Sacrum in fold (Active)   Number of days: 217       Wound patient):      RN SIGNATURE:  Electronically signed by Nancy Murrell RN on 7/14/22 at 2:15 PM EDT    CASE MANAGEMENT/SOCIAL WORK SECTION    Inpatient Status Date: 7/9/2022    Readmission Risk Assessment Score:  Readmission Risk              Risk of Unplanned Readmission:  39           Discharging to Facility/ Agency   Name: Becky Cooper  Address: [address] [place] [address], [facility] [address]  Phone: [phone]  Fax: [phone]    Dialysis Facility (if applicable)   Name: [facility]   Address: [facility]  Dialysis Schedule: Monday, Wednesday, Friday  Phone: [phone]  Fax:    / signature: Electronically signed by Sheron Kaur RN on 7/12/2022 at 11:57 AM      PHYSICIAN SECTION    Prognosis: Good    Condition at Discharge: Stable    Rehab Potential (if transferring to Rehab): Good    Recommended Labs or Other Treatments After Discharge: cbc cmp    Physician Certification: I certify the above information and transfer of Drew Mcconnell  is necessary for the continuing treatment of the diagnosis listed and that she requires [facility] for less 30 days.      Update Admission H&P: No change in H&P    PHYSICIAN SIGNATURE:  Electronically signed by Lamar Daley MD on 7/12/22 at 7:05 AM EDT

## 2022-07-13 LAB
ANION GAP SERPL CALCULATED.3IONS-SCNC: 16 MMOL/L (ref 7–16)
BUN BLDV-MCNC: 52 MG/DL (ref 6–23)
CALCIUM SERPL-MCNC: 8.4 MG/DL (ref 8.6–10.2)
CHLORIDE BLD-SCNC: 101 MMOL/L (ref 98–107)
CO2: 24 MMOL/L (ref 22–29)
CREAT SERPL-MCNC: 6.7 MG/DL (ref 0.5–1)
GFR AFRICAN AMERICAN: 7
GFR NON-AFRICAN AMERICAN: 6 ML/MIN/1.73
GLUCOSE BLD-MCNC: 126 MG/DL (ref 74–99)
HCT VFR BLD CALC: 26.7 % (ref 34–48)
HEMOGLOBIN: 8.3 G/DL (ref 11.5–15.5)
MCH RBC QN AUTO: 33.6 PG (ref 26–35)
MCHC RBC AUTO-ENTMCNC: 31.1 % (ref 32–34.5)
MCV RBC AUTO: 108.1 FL (ref 80–99.9)
METER GLUCOSE: 110 MG/DL (ref 74–99)
METER GLUCOSE: 174 MG/DL (ref 74–99)
METER GLUCOSE: 174 MG/DL (ref 74–99)
METER GLUCOSE: 79 MG/DL (ref 74–99)
PDW BLD-RTO: 15.2 FL (ref 11.5–15)
PLATELET # BLD: 146 E9/L (ref 130–450)
PMV BLD AUTO: 10.3 FL (ref 7–12)
POTASSIUM SERPL-SCNC: 5.2 MMOL/L (ref 3.5–5)
RBC # BLD: 2.47 E12/L (ref 3.5–5.5)
SODIUM BLD-SCNC: 141 MMOL/L (ref 132–146)
WBC # BLD: 5 E9/L (ref 4.5–11.5)

## 2022-07-13 PROCEDURE — 2580000003 HC RX 258: Performed by: FAMILY MEDICINE

## 2022-07-13 PROCEDURE — 82962 GLUCOSE BLOOD TEST: CPT

## 2022-07-13 PROCEDURE — 36415 COLL VENOUS BLD VENIPUNCTURE: CPT

## 2022-07-13 PROCEDURE — 6360000002 HC RX W HCPCS: Performed by: FAMILY MEDICINE

## 2022-07-13 PROCEDURE — G0378 HOSPITAL OBSERVATION PER HR: HCPCS

## 2022-07-13 PROCEDURE — 6370000000 HC RX 637 (ALT 250 FOR IP): Performed by: FAMILY MEDICINE

## 2022-07-13 PROCEDURE — 85027 COMPLETE CBC AUTOMATED: CPT

## 2022-07-13 PROCEDURE — 2060000000 HC ICU INTERMEDIATE R&B

## 2022-07-13 PROCEDURE — 90935 HEMODIALYSIS ONE EVALUATION: CPT

## 2022-07-13 PROCEDURE — 96372 THER/PROPH/DIAG INJ SC/IM: CPT

## 2022-07-13 PROCEDURE — 6370000000 HC RX 637 (ALT 250 FOR IP): Performed by: INTERNAL MEDICINE

## 2022-07-13 PROCEDURE — 80048 BASIC METABOLIC PNL TOTAL CA: CPT

## 2022-07-13 PROCEDURE — 6360000002 HC RX W HCPCS: Performed by: INTERNAL MEDICINE

## 2022-07-13 RX ORDER — ACETIC ACID 0.25 G/100ML
1000 IRRIGANT IRRIGATION DAILY
Status: DISCONTINUED | OUTPATIENT
Start: 2022-07-13 | End: 2022-07-14 | Stop reason: HOSPADM

## 2022-07-13 RX ADMIN — ACETAMINOPHEN 650 MG: 325 TABLET ORAL at 02:16

## 2022-07-13 RX ADMIN — TRAZODONE HYDROCHLORIDE 25 MG: 50 TABLET ORAL at 22:58

## 2022-07-13 RX ADMIN — EPOETIN ALFA-EPBX 10000 UNITS: 10000 INJECTION, SOLUTION INTRAVENOUS; SUBCUTANEOUS at 11:54

## 2022-07-13 RX ADMIN — HEPARIN SODIUM 5000 UNITS: 10000 INJECTION INTRAVENOUS; SUBCUTANEOUS at 15:23

## 2022-07-13 RX ADMIN — ACETAMINOPHEN 650 MG: 325 TABLET ORAL at 23:00

## 2022-07-13 RX ADMIN — BUSPIRONE HYDROCHLORIDE 10 MG: 10 TABLET ORAL at 18:08

## 2022-07-13 RX ADMIN — HEPARIN SODIUM 5000 UNITS: 10000 INJECTION INTRAVENOUS; SUBCUTANEOUS at 06:24

## 2022-07-13 RX ADMIN — POLYETHYLENE GLYCOL 3350 17 G: 17 POWDER, FOR SOLUTION ORAL at 11:50

## 2022-07-13 RX ADMIN — OXYCODONE AND ACETAMINOPHEN 1 TABLET: 5; 325 TABLET ORAL at 18:18

## 2022-07-13 RX ADMIN — Medication 2000 UNITS: at 11:49

## 2022-07-13 RX ADMIN — Medication 10 ML: at 11:50

## 2022-07-13 RX ADMIN — HEPARIN SODIUM 5000 UNITS: 10000 INJECTION INTRAVENOUS; SUBCUTANEOUS at 22:58

## 2022-07-13 RX ADMIN — BUSPIRONE HYDROCHLORIDE 5 MG: 5 TABLET ORAL at 20:09

## 2022-07-13 RX ADMIN — INSULIN GLARGINE 6 UNITS: 100 INJECTION, SOLUTION SUBCUTANEOUS at 20:09

## 2022-07-13 RX ADMIN — Medication 10 ML: at 20:10

## 2022-07-13 RX ADMIN — GABAPENTIN 100 MG: 100 CAPSULE ORAL at 15:23

## 2022-07-13 RX ADMIN — ESCITALOPRAM OXALATE 10 MG: 10 TABLET ORAL at 11:49

## 2022-07-13 RX ADMIN — GABAPENTIN 100 MG: 100 CAPSULE ORAL at 20:09

## 2022-07-13 RX ADMIN — ALOGLIPTIN 6.25 MG: 6.25 TABLET, FILM COATED ORAL at 11:53

## 2022-07-13 RX ADMIN — METOPROLOL SUCCINATE 25 MG: 25 TABLET, EXTENDED RELEASE ORAL at 11:50

## 2022-07-13 ASSESSMENT — PAIN SCALES - GENERAL
PAINLEVEL_OUTOF10: 3
PAINLEVEL_OUTOF10: 0
PAINLEVEL_OUTOF10: 4
PAINLEVEL_OUTOF10: 2
PAINLEVEL_OUTOF10: 0
PAINLEVEL_OUTOF10: 10

## 2022-07-13 ASSESSMENT — PAIN DESCRIPTION - DESCRIPTORS
DESCRIPTORS: ACHING
DESCRIPTORS: ACHING;DISCOMFORT
DESCRIPTORS: ACHING
DESCRIPTORS: ACHING

## 2022-07-13 ASSESSMENT — PAIN - FUNCTIONAL ASSESSMENT
PAIN_FUNCTIONAL_ASSESSMENT: PREVENTS OR INTERFERES SOME ACTIVE ACTIVITIES AND ADLS
PAIN_FUNCTIONAL_ASSESSMENT: ACTIVITIES ARE NOT PREVENTED
PAIN_FUNCTIONAL_ASSESSMENT: PREVENTS OR INTERFERES SOME ACTIVE ACTIVITIES AND ADLS

## 2022-07-13 ASSESSMENT — PAIN DESCRIPTION - ONSET
ONSET: ON-GOING
ONSET: ON-GOING

## 2022-07-13 ASSESSMENT — PAIN DESCRIPTION - ORIENTATION: ORIENTATION: MID

## 2022-07-13 ASSESSMENT — PAIN SCALES - WONG BAKER: WONGBAKER_NUMERICALRESPONSE: 0

## 2022-07-13 ASSESSMENT — PAIN DESCRIPTION - PAIN TYPE
TYPE: CHRONIC PAIN
TYPE: CHRONIC PAIN

## 2022-07-13 ASSESSMENT — PAIN DESCRIPTION - LOCATION
LOCATION: KNEE
LOCATION: COCCYX
LOCATION: OTHER (COMMENT)
LOCATION: BUTTOCKS

## 2022-07-13 NOTE — FLOWSHEET NOTE
Consult coccyx   Patient alert and oriented. Was at [facility]. Then went home  Plans for [facility] after this admission  D/W patient re followup at wound center  Wound drainage green     07/13/22 1120   Wound 07/09/22 Coccyx   Date First Assessed/Time First Assessed: 07/09/22 0345   Present on Hospital Admission: Yes  Primary Wound Type: Pressure Injury  Location: Coccyx   Wound Image    Wound Etiology Pressure Stage 4   Dressing Status Clean;Dry; Intact   Dressing/Treatment ABD; Moist to dry; Roll gauze   Dressing Change Due 07/13/22   Wound Length (cm) 3 cm   Wound Width (cm) 3 cm   Wound Depth (cm) 1 cm   Wound Surface Area (cm^2) 9 cm^2   Change in Wound Size % (l*w) -2.86   Wound Volume (cm^3) 9 cm^3   Wound Healing % 31   Wound Assessment Bleeding;Pink/red   plan acetic acid dressings  Dietician  [place], CNS, Wound Care

## 2022-07-13 NOTE — PLAN OF CARE
Problem: Discharge Planning  Goal: Discharge to home or other facility with appropriate resources  Outcome: Adequate for Discharge  Flowsheets (Taken 7/12/2022 2045)  Discharge to home or other facility with appropriate resources: Identify barriers to discharge with patient and caregiver     Problem: Pain  Goal: Verbalizes/displays adequate comfort level or baseline comfort level  Outcome: Adequate for Discharge  Flowsheets (Taken 7/12/2022 2045)  Verbalizes/displays adequate comfort level or baseline comfort level: Encourage patient to monitor pain and request assistance     Problem: Safety - Adult  Goal: Free from fall injury  Outcome: Adequate for Discharge     Problem: Skin/Tissue Integrity  Goal: Absence of new skin breakdown  Description: 1. Monitor for areas of redness and/or skin breakdown  2. Assess vascular access sites hourly  3. Every 4-6 hours minimum:  Change oxygen saturation probe site  4. Every 4-6 hours:  If on nasal continuous positive airway pressure, respiratory therapy assess nares and determine need for appliance change or resting period.   Outcome: Adequate for Discharge     Problem: Chronic Conditions and Co-morbidities  Goal: Patient's chronic conditions and co-morbidity symptoms are monitored and maintained or improved  Outcome: Adequate for Discharge  Flowsheets (Taken 7/12/2022 2045)  Care Plan - Patient's Chronic Conditions and Co-Morbidity Symptoms are Monitored and Maintained or Improved: Monitor and assess patient's chronic conditions and comorbid symptoms for stability, deterioration, or improvement     Problem: ABCDS Injury Assessment  Goal: Absence of physical injury  Outcome: Adequate for Discharge  Flowsheets (Taken 7/12/2022 2231)  Absence of Physical Injury: Implement safety measures based on patient assessment

## 2022-07-13 NOTE — PROGRESS NOTES
[facility] Quality Flow/Interdisciplinary Rounds Progress Note        Quality Flow Rounds held on July 13, 2022    Disciplines Attending:  Bedside Nurse and Nursing Unit Leadership    Nate Jacobo was admitted on 7/8/2022  8:30 PM    Anticipated Discharge Date:       Disposition:    Herbert Score:  Herbert Scale Score: 15    Readmission Risk              Risk of Unplanned Readmission:  38           Discussed patient goal for the day, patient clinical progression, and barriers to discharge. The following Goal(s) of the Day/Commitment(s) have been identified:  Awaiting precert for Marino Garciaius needs updated at discharge, rapid covid test prior to discharge.       Carlos Dobson RN  July 13, 2022

## 2022-07-13 NOTE — FLOWSHEET NOTE
07/13/22 1020   Vital Signs   /72   Temp 97.9 °F (36.6 °C)   Heart Rate 70   Resp 16   Weight 211 lb 10.3 oz (96 kg)   Weight Method Bed scale   Percent Weight Change -1.84   Pain Assessment   Pain Assessment None - Denies Pain   Post-Hemodialysis Assessment   Post-Treatment Procedures Blood returned; Access bleeding time < 10 minutes   Machine Disinfection Process Exterior Machine Disinfection   Rinseback Volume (ml) 300 ml   Blood Volume Processed (Liters) 57.9 l/min   Dialyzer Clearance Lightly streaked   Duration of Treatment (minutes) 180 minutes   Heparin Amount Administered During Treatment (mL) 0 units   Hemodialysis Intake (ml) 300 ml   Hemodialysis Output (ml) 1500 ml   NET Removed (ml) 1200   Tolerated Treatment Good   Patient Response to Treatment DIALYSIS TREATMENT COMPLETED, PT  TOLERATED WELL, BLOOD PRESSURE STABLE POST HD, NEEDLES R EMOVED, PRESSURE HELD TO SITES.   HEMOSTASIS ACHIEVED, 2X2 AND TAPE APPLIED   Bilateral Breath Sounds Diminished   Edema None   Physician Notified No   Time Off 1020   Patient Disposition Return to room

## 2022-07-13 NOTE — CARE COORDINATION
Social Work/Discharge Planning:  Swathi Wileying with [facility] and left a message in regards to status of pre-cert. Patient will need a negative covid result on day of discharge. Will continue to follow. Electronically signed by SHANTA Soriano on 7/13/2022 at 8:56 AM    Addendum:  Olu Rolon with [facility] initiated pre-cert today. Provided update to patient. Will continue to follow and wait for pre-cert.   Electronically signed by SHANTA Soriano on 7/13/2022 at 11:54 AM

## 2022-07-13 NOTE — PROGRESS NOTES
Nephrology Progess Note  [facility]     Reason for Consult:  ESRD  Requesting Physician:  Dr Edith Chew   Date of Service: 7/13/2022   Chief Complaint:  Weakness   History Obtained From:  Patient, medical record      History of Present Ilness:    FROM 7-9-22 note from Dr. Liu Arevalo  Ms Scheryl Seip is a 66year old female who we are consulted on for evaluation and management of ESRD  She presented to the hospital on 7-8. She left her nursing home on 7-5. She has been weak since that time. No chest pain, shortness of breath, abdominal pain, n/v. She does have constipation  She has ESRD and receives HD MWF via LUE AVF. She missed treatment on 7-6 but did receive a full treatment on 7-8. As her K was 6.4 on admission, she received emergent dialysis overnight. SUBJECTIVE  7/13/22- seen on HD, tolerating well this am.       Past Medical History:        Diagnosis Date    Anemia in chronic kidney disease     Anxiety and depression     Arthritis     Chronic pain syndrome     COVID-19 05/2020    COVID-19     Diabetes mellitus [facility]     Dysphagia, oral phase     GERD (gastroesophageal reflux disease)     Hemodialysis patient ([facility]     M-W-F    Hyperkalemia     Hypertension     Hypertensive kidney disease with stage 4 chronic kidney disease (HCC)     Insomnia     Kidney stones     MDD (major depressive disorder)     Moderate protein-calorie malnutrition (HCC)     Morbid obesity (HCC)     Muscle weakness (generalized)     Obesity     Pressure ulcer of sacral region     Sacral pressure ulcer 08/03/2021    stage 4    Unspecified osteoarthritis, unspecified site     Weakness generalized        Past Surgical History:        Procedure Laterality Date    ABDOMEN SURGERY N/A 5/4/2020    SACRAL WOUND DEBRIDEMENT CALL   WITH TIME AVAIL AM performed by Manjit Robles MD at [address] [place] [address]  x3   [address] [place] [address]  3/31/16    Laparoscopic-Dr. Suellen Nash    CYSTOSCOPY  2011     for kidney stones    DIALYSIS FISTULA CREATION Left 8/5/2021    INSERTION FISTULA LEFT ARM performed by Mei Monroe MD at [address] Right 11/18/2021    REVISION AV FISTULA LEFT ARM performed by Mei Monroe MD at [address]  2009     right     UPPER GASTROINTESTINAL ENDOSCOPY  2.18.15    Dr. Shivam Bal Findings: Mild Gastrits and Duodenitis, 2cm Hiatal Hernia    UPPER GASTROINTESTINAL ENDOSCOPY N/A 5/8/2020    EGD CONTROL HEMORRHAGE performed by Julio Rivera MD at [address] [facility] 5/22/2020    EGD BEDSIDE performed by Gemini Dahl MD at [address] [place] [address] N/A 5/6/2021    INSERTION TUNNELED DIALYSIS CATHETER performed by Mei Monroe MD at [facility] OR       Current Medications:    Current Facility-Administered Medications: insulin glargine (LANTUS) injection vial 6 Units, 6 Units, SubCUTAneous, Nightly  epoetin derek-epbx (RETACRIT) injection 10,000 Units, 10,000 Units, SubCUTAneous, Once per day on Mon Wed Fri  gabapentin (NEURONTIN) capsule 100 mg, 100 mg, Oral, TID  bisacodyl (DULCOLAX) EC tablet 10 mg, 10 mg, Oral, Daily PRN  busPIRone (BUSPAR) tablet 10 mg, 10 mg, Oral, BID  busPIRone (BUSPAR) tablet 5 mg, 5 mg, Oral, Nightly  docusate sodium (COLACE) capsule 100 mg, 100 mg, Oral, BID  escitalopram (LEXAPRO) tablet 10 mg, 10 mg, Oral, Daily  ferric citrate (AURYXIA) tablet 420 mg, 420 mg, Oral, TID WC  linaCLOtide (LINZESS) capsule 72 mcg, 72 mcg, Oral, QAM AC  magnesium hydroxide (MILK OF MAGNESIA) 400 MG/5ML suspension 30 mL, 30 mL, Oral, Daily PRN  metoprolol succinate (TOPROL XL) extended release tablet 25 mg, 25 mg, Oral, Daily  midodrine (PROAMATINE) tablet 15 mg, 15 mg, Oral, Once per day on Mon Wed Fri  polyethylene glycol (GLYCOLAX) packet 17 g, 17 g, Oral, Daily  senna (SENOKOT) tablet 17.2 mg, 2 tablet, Oral, BID  traZODone (DESYREL) tablet 25 mg, 25 mg, Oral, Nightly PRN  vitamin D (CHOLECALCIFEROL) tablet 2,000 Units, 2,000 Units, Oral, Daily  sodium chloride flush 0.9 % injection 5-40 mL, 5-40 mL, IntraVENous, 2 times per day  sodium chloride flush 0.9 % injection 5-40 mL, 5-40 mL, IntraVENous, PRN  0.9 % sodium chloride infusion, , IntraVENous, PRN  heparin (porcine) injection 5,000 Units, 5,000 Units, SubCUTAneous, 3 times per day  acetaminophen (TYLENOL) tablet 650 mg, 650 mg, Oral, Q6H PRN **OR** acetaminophen (TYLENOL) suppository 650 mg, 650 mg, Rectal, Q6H PRN  glucose chewable tablet 16 g, 4 tablet, Oral, PRN  dextrose bolus 10% 125 mL, 125 mL, IntraVENous, PRN **OR** dextrose bolus 10% 250 mL, 250 mL, IntraVENous, PRN  glucagon (rDNA) injection 1 mg, 1 mg, IntraMUSCular, PRN  dextrose 5 % solution, 100 mL/hr, IntraVENous, PRN  alogliptin (NESINA) tablet 6.25 mg, 6.25 mg, Oral, Daily  oxyCODONE-acetaminophen (PERCOCET) 5-325 MG per tablet 1 tablet, 1 tablet, Oral, Q8H PRN **AND** oxyCODONE (ROXICODONE) immediate release tablet 2.5 mg, 2.5 mg, Oral, Q8H PRN  prochlorperazine (COMPAZINE) tablet 5 mg, 5 mg, Oral, Q6H PRN    Allergies:  Patient has no known allergies. Social History:    No tobacco    Family History:   No kidney disease     Review of Systems:   Pertinent positives stated above in HPI. All other systems were reviewed and were negative.     Physical exam:   Constitutional:  VITALS:  BP (!) 111/45   Pulse 61   Temp 98 °F (36.7 °C)   Resp 16   Ht 5' 1\" (1.549 m)   Wt 215 lb 9.8 oz (97.8 kg)   SpO2 96%   BMI 40.74 kg/m²     Gen: alert, awake, no acute distress  Eyes: anicteric sclerae, eomi  HEENT: atraumatic/normocephalic  Neck: supple, no JVD  Lungs: clear to ascultation bilaterally, equal lung expansion  CV: RRR, no murmurs,  Abdomen: soft, nontender, nondistended, normoactive bowel sounds  Extremitiy: no edema  : no CVA tenderness  Skin: no rash, tugor wnl  Neuro: no focal deficits  Psych: cooperative and appropriate      Data:       Last 3 CMP:    Recent Labs 07/10/22  1210 07/11/22  0252    142   K 5.1* 5.6*    102   CO2 27 26   BUN 42* 53*   CREATININE 5.4* 6.4*   GLUCOSE 137* 95   CALCIUM 9.0 8.8         Last 3 Glucose:     Recent Labs     07/10/22  1210 07/11/22  0252   GLUCOSE 137* 95         Last 3 POC Glucose:     No results for input(s): POCGLU in the last 72 hours. Last 3 CK, CKMB, Troponin  No results for input(s): CKTOTAL, CKMB, TROPONINI in the last 72 hours. Last 3 CBC:  Recent Labs     07/10/22  1210 07/11/22  0252 07/13/22  0720   WBC 5.7 6.2 5.0   RBC 2.79* 2.68* 2.47*   HGB 9.3* 8.9* 8.3*   HCT 29.7* 29.5* 26.7*   .5* 110.1* 108.1*   MCH 33.3 33.2 33.6   MCHC 31.3* 30.2* 31.1*   RDW 15.1* 15.0 15.2*    176 146   MPV 9.8 9.9 10.3       Last 3 Hepatic Function Panel:  No results for input(s): ALKPHOS, ALT, AST, PROT, BILITOT, BILIDIR, LABALBU in the last 72 hours. Albumin:  No results for input(s): LABALBU in the last 72 hours. Calcium:  Recent Labs     07/11/22  0252   CALCIUM 8.8       Ionized Calcium:  No results for input(s): IONCA in the last 72 hours. Magnesium:    Recent Labs     07/10/22  1210   MG 3.1*         ABGs:  No results for input(s): PHART, PO2ART, IBT8CMQ, CHV4RJQ, BEART, T8ZYRPJD in the last 72 hours. Lactic Acid:  No results for input(s): LACTA in the last 72 hours. Last 3 Amylase:  No results for input(s): AMYLASE in the last 72 hours. Last 3 Lipase:  No results for input(s): LIPASE in the last 72 hours. Last 3 BNP:  No results for input(s): BNP in the last 72 hours. Assessment/Plan      1 ESRD  HD MWF via LUE AVF  Missed outpatient HD on 7/6 but received outpatient treatment on 7/8  Received emergent HD on admission due to hyperkalemia  Seen on treatment today, tolerating well. PLAN:  1. Will follow daily for dialysis needs  2. Next treatment 7/15    2 HTN with CKD V  BP goal <140/90  With some hypotension- on midodrine  PLAN:  1.  Continue to monitor    3 Hyperkalemia  K 6.4 on admission- awaiting labs today to be completed. PLAN:  1. Follow  with dialysis  2. Follow K+     4 Anemia with CKD  No reported blood loss  Goal for ESRD HgB 10-12  PLAN:  1. Continue  ARUN for HgB <10    5 Hyperphosphatemia  PLAN:  1. Continue On binders  2. Follow PO4       Thank you for the opportunity to participate in the care of Ms Fela Patel, APRN - CNP  7/13/2022  9:14 AM   Patient seen and examined. I had a face to face encounter with the patient while she was on IHD. She denied SOB or pain and was tolerating the procedure well  Agree with exam.    Agree with  formulation, assessment and plan as outlined above and directed by me. Addition and corrections were done as deemed appropriate. My exam and plan include:     A/P:  1.  ESKD-tolerating the procedure well with the LUE AVF-with net 1.2L vol targeted  Continue current treatment as outlined above    KULWINDER Adrian MD

## 2022-07-14 VITALS
HEART RATE: 64 BPM | BODY MASS INDEX: 40.62 KG/M2 | WEIGHT: 215.17 LBS | SYSTOLIC BLOOD PRESSURE: 141 MMHG | RESPIRATION RATE: 17 BRPM | HEIGHT: 61 IN | OXYGEN SATURATION: 98 % | DIASTOLIC BLOOD PRESSURE: 84 MMHG | TEMPERATURE: 97.8 F

## 2022-07-14 LAB
ALBUMIN SERPL-MCNC: 3.2 G/DL (ref 3.5–5.2)
ALP BLD-CCNC: 76 U/L (ref 35–104)
ALT SERPL-CCNC: 19 U/L (ref 0–32)
ANION GAP SERPL CALCULATED.3IONS-SCNC: 13 MMOL/L (ref 7–16)
AST SERPL-CCNC: 28 U/L (ref 0–31)
BILIRUB SERPL-MCNC: 0.3 MG/DL (ref 0–1.2)
BUN BLDV-MCNC: 39 MG/DL (ref 6–23)
CALCIUM SERPL-MCNC: 8.9 MG/DL (ref 8.6–10.2)
CHLORIDE BLD-SCNC: 103 MMOL/L (ref 98–107)
CO2: 26 MMOL/L (ref 22–29)
CREAT SERPL-MCNC: 5.3 MG/DL (ref 0.5–1)
GFR AFRICAN AMERICAN: 10
GFR NON-AFRICAN AMERICAN: 8 ML/MIN/1.73
GLUCOSE BLD-MCNC: 124 MG/DL (ref 74–99)
HCT VFR BLD CALC: 26.1 % (ref 34–48)
HEMOGLOBIN: 8.3 G/DL (ref 11.5–15.5)
MAGNESIUM: 2.5 MG/DL (ref 1.6–2.6)
MCH RBC QN AUTO: 33.9 PG (ref 26–35)
MCHC RBC AUTO-ENTMCNC: 31.8 % (ref 32–34.5)
MCV RBC AUTO: 106.5 FL (ref 80–99.9)
METER GLUCOSE: 78 MG/DL (ref 74–99)
METER GLUCOSE: 99 MG/DL (ref 74–99)
PDW BLD-RTO: 14.8 FL (ref 11.5–15)
PHOSPHORUS: 6 MG/DL (ref 2.5–4.5)
PLATELET # BLD: 141 E9/L (ref 130–450)
PMV BLD AUTO: 9.8 FL (ref 7–12)
POTASSIUM SERPL-SCNC: 5.2 MMOL/L (ref 3.5–5)
RBC # BLD: 2.45 E12/L (ref 3.5–5.5)
SARS-COV-2, NAAT: NOT DETECTED
SODIUM BLD-SCNC: 142 MMOL/L (ref 132–146)
TOTAL PROTEIN: 6.5 G/DL (ref 6.4–8.3)
WBC # BLD: 4.9 E9/L (ref 4.5–11.5)

## 2022-07-14 PROCEDURE — 87635 SARS-COV-2 COVID-19 AMP PRB: CPT

## 2022-07-14 PROCEDURE — 36415 COLL VENOUS BLD VENIPUNCTURE: CPT

## 2022-07-14 PROCEDURE — 83735 ASSAY OF MAGNESIUM: CPT

## 2022-07-14 PROCEDURE — 6360000002 HC RX W HCPCS: Performed by: FAMILY MEDICINE

## 2022-07-14 PROCEDURE — 6370000000 HC RX 637 (ALT 250 FOR IP): Performed by: FAMILY MEDICINE

## 2022-07-14 PROCEDURE — 96372 THER/PROPH/DIAG INJ SC/IM: CPT

## 2022-07-14 PROCEDURE — 84100 ASSAY OF PHOSPHORUS: CPT

## 2022-07-14 PROCEDURE — 6370000000 HC RX 637 (ALT 250 FOR IP): Performed by: INTERNAL MEDICINE

## 2022-07-14 PROCEDURE — 2500000003 HC RX 250 WO HCPCS: Performed by: FAMILY MEDICINE

## 2022-07-14 PROCEDURE — 82962 GLUCOSE BLOOD TEST: CPT

## 2022-07-14 PROCEDURE — 80053 COMPREHEN METABOLIC PANEL: CPT

## 2022-07-14 PROCEDURE — 85027 COMPLETE CBC AUTOMATED: CPT

## 2022-07-14 PROCEDURE — G0378 HOSPITAL OBSERVATION PER HR: HCPCS

## 2022-07-14 RX ORDER — ACETIC ACID 0.25 G/100ML
IRRIGANT IRRIGATION
DISCHARGE
Start: 2022-07-14 | End: 2022-10-25 | Stop reason: ALTCHOICE

## 2022-07-14 RX ORDER — OXYCODONE HYDROCHLORIDE AND ACETAMINOPHEN 5; 325 MG/1; MG/1
1 TABLET ORAL EVERY 8 HOURS PRN
Qty: 42 TABLET | Refills: 0 | Status: SHIPPED | OUTPATIENT
Start: 2022-07-14 | End: 2022-07-28

## 2022-07-14 RX ADMIN — ALOGLIPTIN 6.25 MG: 6.25 TABLET, FILM COATED ORAL at 09:23

## 2022-07-14 RX ADMIN — ACETIC ACID 1000 ML: 250 IRRIGANT IRRIGATION at 10:45

## 2022-07-14 RX ADMIN — HEPARIN SODIUM 5000 UNITS: 10000 INJECTION INTRAVENOUS; SUBCUTANEOUS at 06:02

## 2022-07-14 RX ADMIN — BUSPIRONE HYDROCHLORIDE 10 MG: 10 TABLET ORAL at 09:23

## 2022-07-14 RX ADMIN — POLYETHYLENE GLYCOL 3350 17 G: 17 POWDER, FOR SOLUTION ORAL at 09:24

## 2022-07-14 RX ADMIN — OXYCODONE AND ACETAMINOPHEN 1 TABLET: 5; 325 TABLET ORAL at 09:29

## 2022-07-14 RX ADMIN — DOCUSATE SODIUM 100 MG: 100 CAPSULE, LIQUID FILLED ORAL at 09:23

## 2022-07-14 RX ADMIN — ESCITALOPRAM OXALATE 10 MG: 10 TABLET ORAL at 09:24

## 2022-07-14 RX ADMIN — METOPROLOL SUCCINATE 25 MG: 25 TABLET, EXTENDED RELEASE ORAL at 09:24

## 2022-07-14 RX ADMIN — SENNOSIDES 17.2 MG: 8.6 TABLET, FILM COATED ORAL at 09:24

## 2022-07-14 RX ADMIN — GABAPENTIN 100 MG: 100 CAPSULE ORAL at 09:24

## 2022-07-14 RX ADMIN — Medication 2000 UNITS: at 09:24

## 2022-07-14 ASSESSMENT — PAIN SCALES - GENERAL
PAINLEVEL_OUTOF10: 7
PAINLEVEL_OUTOF10: 0
PAINLEVEL_OUTOF10: 0
PAINLEVEL_OUTOF10: 7

## 2022-07-14 NOTE — PROGRESS NOTES
P Quality Flow/Interdisciplinary Rounds Progress Note        Quality Flow Rounds held on July 14, 2022    Disciplines Attending:  Bedside Nurse, ,  and Nursing Unit Leadership    Mi Arreaga was admitted on 7/8/2022  8:30 PM    Anticipated Discharge Date:       Disposition:    Herbert Score:  Herbert Scale Score: 15    Readmission Risk              Risk of Unplanned Readmission:  40           Discussed patient goal for the day, patient clinical progression, and barriers to discharge. The following Goal(s) of the Day/Commitment(s) have been identified:  Anticipate discharge today, waiting on insurance auth.     Vicky Arora RN  July 14, 2022

## 2022-07-14 NOTE — PROGRESS NOTES
Subjective: The patient is awake and alert. No problems overnight. Denies chest pain, angina, and dyspnea. Denies abdominal pain. Tolerating diet. No nausea or vomiting. Awaiting precert. Objective:    BP (!) 119/48   Pulse 74   Temp 98.1 °F (36.7 °C) (Oral)   Resp 18   Ht 5' 1\" (1.549 m)   Wt 215 lb 2.7 oz (97.6 kg)   SpO2 98%   BMI 40.66 kg/m²     Heart:  RRR, no murmurs, gallops, or rubs.   Lungs:  CTA bilaterally, no wheeze, rales or rhonchi  Abd: bowel sounds present, nontender, nondistended, no masses  Extrem:  No clubbing, cyanosis, or edema    CBC with Differential:    Lab Results   Component Value Date/Time    WBC 5.0 07/13/2022 07:20 AM    RBC 2.47 07/13/2022 07:20 AM    HGB 8.3 07/13/2022 07:20 AM    HCT 26.7 07/13/2022 07:20 AM     07/13/2022 07:20 AM    .1 07/13/2022 07:20 AM    MCH 33.6 07/13/2022 07:20 AM    MCHC 31.1 07/13/2022 07:20 AM    RDW 15.2 07/13/2022 07:20 AM    NRBC 0.9 05/25/2020 02:00 PM    SEGSPCT 71 08/16/2013 05:00 AM    BANDSPCT 1 03/29/2016 07:37 PM    METASPCT 0.9 05/10/2020 03:45 AM    LYMPHOPCT 17.6 07/10/2022 12:10 PM    PROMYELOPCT 0.9 05/09/2020 04:15 AM    MONOPCT 10.8 07/10/2022 12:10 PM    MYELOPCT 1.7 05/11/2020 04:45 AM    BASOPCT 0.9 07/10/2022 12:10 PM    MONOSABS 0.62 07/10/2022 12:10 PM    LYMPHSABS 1.01 07/10/2022 12:10 PM    EOSABS 0.26 07/10/2022 12:10 PM    BASOSABS 0.05 07/10/2022 12:10 PM     CMP:    Lab Results   Component Value Date/Time     07/13/2022 07:20 AM    K 5.2 07/13/2022 07:20 AM    K 6.4 07/08/2022 09:42 PM     07/13/2022 07:20 AM    CO2 24 07/13/2022 07:20 AM    BUN 52 07/13/2022 07:20 AM    CREATININE 6.7 07/13/2022 07:20 AM    GFRAA 7 07/13/2022 07:20 AM    LABGLOM 6 07/13/2022 07:20 AM    GLUCOSE 126 07/13/2022 07:20 AM    GLUCOSE 149 10/12/2011 09:59 AM    PROT 6.0 04/13/2022 02:55 AM    LABALBU 3.0 04/13/2022 02:55 AM    LABALBU 4.1 10/12/2011 09:59 AM    CALCIUM 8.4 07/13/2022 07:20 AM    BILITOT <0.2 04/13/2022 02:55 AM    ALKPHOS 115 04/13/2022 02:55 AM     04/13/2022 02:55 AM    ALT 34 04/13/2022 02:55 AM        Assessment:    Patient Active Problem List   Diagnosis    Neurologic gait dysfunction    Renal failure, acute on chronic (HCC)    Diabetes mellitus ([facility] [address]    Hypertension    Arthritis    Kidney disease    Knee problem    Anemia due to stage 3 chronic kidney disease    Primary osteoarthritis of both knees    Acute on chronic renal insufficiency    Acute kidney injury superimposed on CKD (HCC)    Acute renal failure superimposed on chronic kidney disease, on chronic dialysis (HCC)    Moderate protein-calorie malnutrition (HCC)    Hyperkalemia    [facility]    Stage 5 chronic kidney disease on chronic dialysis (HCC)    Pressure injury of sacral region, stage 4 (HCC)    Pressure injury of right hip, stage 3 (HCC)    Unresponsive episode    Left bundle branch block    Pure hypercholesterolemia    Moderate obesity    Failure to thrive in adult       Plan:  Discharge        Jef Corcoran MD  7:07 AM  7/14/2022

## 2022-07-14 NOTE — CARE COORDINATION
Social Work/Discharge Planning:  Reford Mychal Simms called with pre-cert approval.  Patient will need a negative covid. Notified charge nurse of pre-cert approval.  Electronically signed by SHANTA Christine on 7/14/2022 at 8:54 AM    Addendum:  Informed Reford Mychal Simms that patient will discharge at 2:00pm.  Unit to notify patient and family.   Electronically signed by SHANTA Christine on 7/14/2022 at 9:44 AM

## 2022-07-14 NOTE — PROGRESS NOTES
Nephrology Progess Note  [facility]     Reason for Consult:  ESRD  Requesting Physician:  Dr Ronnie Valencia   Date of Service: 7/14/2022   Chief Complaint:  Weakness   History Obtained From:  Patient, medical record      History of Present Ilness:    FROM 7-9-22 note from Dr. Fany Jerez  Ms Jose Elias GRANGER, McGeecori Patel is a 66year old female who we are consulted on for evaluation and management of ESRD  She presented to the hospital on 7-8. She left her nursing home on 7-5. She has been weak since that time. No chest pain, shortness of breath, abdominal pain, n/v. She does have constipation  She has ESRD and receives HD MWF via [facility]. She missed treatment on 7-6 but did receive a full treatment on 7-8. As her K was 6.4 on admission, she received emergent dialysis overnight. SUBJECTIVE  7/14/22- sleeping, no distress. To go to rehab this afternoon. Past Medical History:        Diagnosis Date    Anemia in chronic kidney disease     Anxiety and depression     Arthritis     Chronic pain syndrome     COVID-19 05/2020    COVID-19     Diabetes mellitus [facility]     Dysphagia, oral phase     GERD (gastroesophageal reflux disease)     Hemodialysis patient ([facility]     M-W-F    Hyperkalemia     Hypertension     Hypertensive kidney disease with stage 4 chronic kidney disease (HCC)     Insomnia     Kidney stones     MDD (major depressive disorder)     Moderate protein-calorie malnutrition (HCC)     Morbid obesity (HCC)     Muscle weakness (generalized)     Obesity     Pressure ulcer of sacral region     Sacral pressure ulcer 08/03/2021    stage 4    Unspecified osteoarthritis, unspecified site     Weakness generalized        Past Surgical History:        Procedure Laterality Date    ABDOMEN SURGERY N/A 5/4/2020    SACRAL WOUND DEBRIDEMENT CALL   WITH TIME AVAIL AM performed by Delvin Ramirez MD at [address]   x3    CHOLECYSTECTOMY  3/31/16    Laparoscopic-Dr. Shakeel Salas 2011     for kidney stones    DIALYSIS FISTULA CREATION Left 8/5/2021    INSERTION FISTULA LEFT ARM performed by Usman Cole MD at [address] [place] [address] Right 11/18/2021    REVISION AV FISTULA LEFT ARM performed by Usman Cole MD at [address]  2009     right     UPPER GASTROINTESTINAL ENDOSCOPY  2.18.15    Dr. Nandini Lobato Findings: Mild Gastrits and Duodenitis, 2cm Hiatal Hernia    UPPER GASTROINTESTINAL ENDOSCOPY N/A 5/8/2020    EGD CONTROL HEMORRHAGE performed by Riddhi Johnson MD at [address] [place] [address] 5/22/2020    EGD BEDSIDE performed by Turner Rossi MD at [address] [place] [address] N/A 5/6/2021    INSERTION TUNNELED DIALYSIS CATHETER performed by Usman Cole MD at [facility] OR       Current Medications:    Current Facility-Administered Medications: acetic acid 0.25 % irrigation 1,000 mL, 1,000 mL, Irrigation, Daily  insulin glargine (LANTUS) injection vial 6 Units, 6 Units, SubCUTAneous, Nightly  epoetin derek-epbx (RETACRIT) injection 10,000 Units, 10,000 Units, SubCUTAneous, Once per day on Mon Wed Fri  gabapentin (NEURONTIN) capsule 100 mg, 100 mg, Oral, TID  bisacodyl (DULCOLAX) EC tablet 10 mg, 10 mg, Oral, Daily PRN  busPIRone (BUSPAR) tablet 10 mg, 10 mg, Oral, BID  busPIRone (BUSPAR) tablet 5 mg, 5 mg, Oral, Nightly  docusate sodium (COLACE) capsule 100 mg, 100 mg, Oral, BID  escitalopram (LEXAPRO) tablet 10 mg, 10 mg, Oral, Daily  ferric citrate (AURYXIA) tablet 420 mg, 420 mg, Oral, TID WC  linaCLOtide (LINZESS) capsule 72 mcg, 72 mcg, Oral, QAM AC  magnesium hydroxide (MILK OF MAGNESIA) 400 MG/5ML suspension 30 mL, 30 mL, Oral, Daily PRN  metoprolol succinate (TOPROL XL) extended release tablet 25 mg, 25 mg, Oral, Daily  midodrine (PROAMATINE) tablet 15 mg, 15 mg, Oral, Once per day on Mon Wed Fri  polyethylene glycol (GLYCOLAX) packet 17 g, 17 g, Oral, Daily  senna (SENOKOT) tablet 17.2 mg, 2 tablet, Oral, BID  traZODone (DESYREL) tablet 25 mg, 25 mg, Oral, Nightly PRN  vitamin D (CHOLECALCIFEROL) tablet 2,000 Units, 2,000 Units, Oral, Daily  sodium chloride flush 0.9 % injection 5-40 mL, 5-40 mL, IntraVENous, 2 times per day  sodium chloride flush 0.9 % injection 5-40 mL, 5-40 mL, IntraVENous, PRN  0.9 % sodium chloride infusion, , IntraVENous, PRN  heparin (porcine) injection 5,000 Units, 5,000 Units, SubCUTAneous, 3 times per day  acetaminophen (TYLENOL) tablet 650 mg, 650 mg, Oral, Q6H PRN **OR** acetaminophen (TYLENOL) suppository 650 mg, 650 mg, Rectal, Q6H PRN  glucose chewable tablet 16 g, 4 tablet, Oral, PRN  dextrose bolus 10% 125 mL, 125 mL, IntraVENous, PRN **OR** dextrose bolus 10% 250 mL, 250 mL, IntraVENous, PRN  glucagon (rDNA) injection 1 mg, 1 mg, IntraMUSCular, PRN  dextrose 5 % solution, 100 mL/hr, IntraVENous, PRN  alogliptin (NESINA) tablet 6.25 mg, 6.25 mg, Oral, Daily  oxyCODONE-acetaminophen (PERCOCET) 5-325 MG per tablet 1 tablet, 1 tablet, Oral, Q8H PRN **AND** oxyCODONE (ROXICODONE) immediate release tablet 2.5 mg, 2.5 mg, Oral, Q8H PRN  prochlorperazine (COMPAZINE) tablet 5 mg, 5 mg, Oral, Q6H PRN    Allergies:  Patient has no known allergies. Social History:    No tobacco    Family History:   No kidney disease     Review of Systems:   Pertinent positives stated above in HPI. All other systems were reviewed and were negative.     Physical exam:   Constitutional:  VITALS:  BP (!) 103/39   Pulse 69   Temp 98.4 °F (36.9 °C) (Oral)   Resp 16   Ht 5' 1\" (1.549 m)   Wt 215 lb 2.7 oz (97.6 kg)   SpO2 98%   BMI 40.66 kg/m²     Gen: alert, awake, no acute distress  Eyes: anicteric sclerae, eomi  HEENT: atraumatic/normocephalic  Neck: supple, no JVD  Lungs: clear to ascultation bilaterally, equal lung expansion  CV: RRR, no murmurs,  Abdomen: soft, nontender, nondistended, normoactive bowel sounds  Extremitiy: no edema  Skin: no rash, tugor wnl  Neuro: no focal deficits  Psych: cooperative and appropriate      Data:       Last 3 CMP:    Recent Labs     07/13/22  0720      K 5.2*      CO2 24   BUN 52*   CREATININE 6.7*   GLUCOSE 126*   CALCIUM 8.4*         Last 3 Glucose:     Recent Labs     07/13/22  0720   GLUCOSE 126*         Last 3 POC Glucose:     No results for input(s): POCGLU in the last 72 hours. Last 3 CK, CKMB, Troponin  No results for input(s): CKTOTAL, CKMB, TROPONINI in the last 72 hours. Last 3 CBC:  Recent Labs     07/13/22  0720   WBC 5.0   RBC 2.47*   HGB 8.3*   HCT 26.7*   .1*   MCH 33.6   MCHC 31.1*   RDW 15.2*      MPV 10.3       Last 3 Hepatic Function Panel:  No results for input(s): ALKPHOS, ALT, AST, PROT, BILITOT, BILIDIR, LABALBU in the last 72 hours. Albumin:  No results for input(s): LABALBU in the last 72 hours. Calcium:  Recent Labs     07/13/22  0720   CALCIUM 8.4*       Ionized Calcium:  No results for input(s): IONCA in the last 72 hours. Magnesium:    No results for input(s): MG in the last 72 hours. ABGs:  No results for input(s): PHART, PO2ART, SNY9UTP, LFY5VPH, BEART, I9PYHAQW in the last 72 hours. Lactic Acid:  No results for input(s): LACTA in the last 72 hours. Last 3 Amylase:  No results for input(s): AMYLASE in the last 72 hours. Last 3 Lipase:  No results for input(s): LIPASE in the last 72 hours. Last 3 BNP:  No results for input(s): BNP in the last 72 hours. Assessment/Plan      1 ESRD  HD MWF via [facility]  Missed outpatient HD on 7/6 but received outpatient treatment on 7/8  Received emergent HD on admission due to hyperkalemia  Seen on treatment today, tolerating well. PLAN:  1. Will follow daily for dialysis needs  2. Next treatment 7/15 as OP    2 HTN with CKD V  BP goal <140/90  With some hypotension- on midodrine  PLAN:  1. Continue to monitor    3 Hyperkalemia  K 6.4 on admission  PLAN:  1. Follow  with dialysis  2.  Follow K+     4 Anemia with CKD  No reported blood loss  Goal for ESRD HgB 10-12  PLAN:  1. Continue  ARUN for HgB <10    5 Hyperphosphatemia  PLAN:  1. Continue On binders  2. Follow PO4       Thank you for the opportunity to participate in the care of Ms Adilene Berman, PABLO - CNP  7/14/2022  8:57 AM   Patient seen and examined. I had a face to face encounter with the patient. Pt states her only complaint is knee pain  Agree with exam.    Agree with  formulation, assessment and plan as outlined above and directed by me. Addition and corrections were done as deemed appropriate. My exam and plan include:     A/P:  1.  ESKD-to continue Q MWF IHD as an outpt as pt for D/C   Today-will follow up at IHD  Continue current treatment as outlined above    KULWINDER Adrian MD

## 2022-07-15 LAB
ALBUMIN SERPL-MCNC: 3.4 G/DL (ref 3.5–5.2)
ALP BLD-CCNC: 80 U/L (ref 35–104)
ALT SERPL-CCNC: 21 U/L (ref 0–32)
ANION GAP SERPL CALCULATED.3IONS-SCNC: 18 MMOL/L (ref 7–16)
AST SERPL-CCNC: 28 U/L (ref 0–31)
BILIRUB SERPL-MCNC: 0.3 MG/DL (ref 0–1.2)
BUN BLDV-MCNC: 50 MG/DL (ref 6–23)
CALCIUM SERPL-MCNC: 8.9 MG/DL (ref 8.6–10.2)
CHLORIDE BLD-SCNC: 104 MMOL/L (ref 98–107)
CHOLESTEROL, TOTAL: 144 MG/DL (ref 0–199)
CO2: 23 MMOL/L (ref 22–29)
CREAT SERPL-MCNC: 6.4 MG/DL (ref 0.5–1)
GFR AFRICAN AMERICAN: 8
GFR NON-AFRICAN AMERICAN: 6 ML/MIN/1.73
GLUCOSE BLD-MCNC: 85 MG/DL (ref 74–99)
HBA1C MFR BLD: 5 % (ref 4–5.6)
HCT VFR BLD CALC: 28 % (ref 34–48)
HDLC SERPL-MCNC: 34 MG/DL
HEMOGLOBIN: 8.6 G/DL (ref 11.5–15.5)
LDL CHOLESTEROL CALCULATED: 81 MG/DL (ref 0–99)
MCH RBC QN AUTO: 32.8 PG (ref 26–35)
MCHC RBC AUTO-ENTMCNC: 30.7 % (ref 32–34.5)
MCV RBC AUTO: 106.9 FL (ref 80–99.9)
PDW BLD-RTO: 15 FL (ref 11.5–15)
PLATELET # BLD: 174 E9/L (ref 130–450)
PMV BLD AUTO: 10 FL (ref 7–12)
POTASSIUM SERPL-SCNC: 5.8 MMOL/L (ref 3.5–5)
RBC # BLD: 2.62 E12/L (ref 3.5–5.5)
SODIUM BLD-SCNC: 145 MMOL/L (ref 132–146)
TOTAL PROTEIN: 6.9 G/DL (ref 6.4–8.3)
TRIGL SERPL-MCNC: 143 MG/DL (ref 0–149)
VITAMIN D 25-HYDROXY: 45 NG/ML (ref 30–100)
VLDLC SERPL CALC-MCNC: 29 MG/DL
WBC # BLD: 5.5 E9/L (ref 4.5–11.5)

## 2022-07-18 LAB
ALBUMIN SERPL-MCNC: 3.3 G/DL (ref 3.5–5.2)
ALP BLD-CCNC: 196 U/L (ref 35–104)
ALT SERPL-CCNC: 105 U/L (ref 0–32)
ANION GAP SERPL CALCULATED.3IONS-SCNC: 19 MMOL/L (ref 7–16)
AST SERPL-CCNC: 140 U/L (ref 0–31)
BILIRUB SERPL-MCNC: 0.3 MG/DL (ref 0–1.2)
BUN BLDV-MCNC: 20 MG/DL (ref 6–23)
CALCIUM SERPL-MCNC: 9 MG/DL (ref 8.6–10.2)
CHLORIDE BLD-SCNC: 97 MMOL/L (ref 98–107)
CO2: 21 MMOL/L (ref 22–29)
CREAT SERPL-MCNC: 3.9 MG/DL (ref 0.5–1)
GFR AFRICAN AMERICAN: 14
GFR NON-AFRICAN AMERICAN: 11 ML/MIN/1.73
GLUCOSE BLD-MCNC: 133 MG/DL (ref 74–99)
HCT VFR BLD CALC: 34.5 % (ref 34–48)
HEMOGLOBIN: 10.6 G/DL (ref 11.5–15.5)
MCH RBC QN AUTO: 33.2 PG (ref 26–35)
MCHC RBC AUTO-ENTMCNC: 30.7 % (ref 32–34.5)
MCV RBC AUTO: 108.2 FL (ref 80–99.9)
PDW BLD-RTO: 15.9 FL (ref 11.5–15)
PLATELET # BLD: 239 E9/L (ref 130–450)
PMV BLD AUTO: 10.1 FL (ref 7–12)
POTASSIUM SERPL-SCNC: 4.2 MMOL/L (ref 3.5–5)
RBC # BLD: 3.19 E12/L (ref 3.5–5.5)
SODIUM BLD-SCNC: 137 MMOL/L (ref 132–146)
TOTAL PROTEIN: 7.3 G/DL (ref 6.4–8.3)
WBC # BLD: 10.5 E9/L (ref 4.5–11.5)

## 2022-07-22 LAB
ALBUMIN SERPL-MCNC: 3.3 G/DL (ref 3.5–5.2)
ALP BLD-CCNC: 120 U/L (ref 35–104)
ALT SERPL-CCNC: 22 U/L (ref 0–32)
ANION GAP SERPL CALCULATED.3IONS-SCNC: 14 MMOL/L (ref 7–16)
AST SERPL-CCNC: 15 U/L (ref 0–31)
BILIRUB SERPL-MCNC: 0.2 MG/DL (ref 0–1.2)
BUN BLDV-MCNC: 35 MG/DL (ref 6–23)
CALCIUM SERPL-MCNC: 9.5 MG/DL (ref 8.6–10.2)
CHLORIDE BLD-SCNC: 100 MMOL/L (ref 98–107)
CO2: 29 MMOL/L (ref 22–29)
CREAT SERPL-MCNC: 6.9 MG/DL (ref 0.5–1)
GFR AFRICAN AMERICAN: 7
GFR NON-AFRICAN AMERICAN: 6 ML/MIN/1.73
GLUCOSE BLD-MCNC: 105 MG/DL (ref 74–99)
HCT VFR BLD CALC: 31.3 % (ref 34–48)
HEMOGLOBIN: 9.5 G/DL (ref 11.5–15.5)
MCH RBC QN AUTO: 34.1 PG (ref 26–35)
MCHC RBC AUTO-ENTMCNC: 30.4 % (ref 32–34.5)
MCV RBC AUTO: 112.2 FL (ref 80–99.9)
PDW BLD-RTO: 17.5 FL (ref 11.5–15)
PLATELET # BLD: 203 E9/L (ref 130–450)
PMV BLD AUTO: 9.7 FL (ref 7–12)
POTASSIUM SERPL-SCNC: 4.7 MMOL/L (ref 3.5–5)
RBC # BLD: 2.79 E12/L (ref 3.5–5.5)
SODIUM BLD-SCNC: 143 MMOL/L (ref 132–146)
TOTAL PROTEIN: 6.8 G/DL (ref 6.4–8.3)
WBC # BLD: 5.7 E9/L (ref 4.5–11.5)

## 2022-07-29 LAB
ALBUMIN SERPL-MCNC: 3.3 G/DL (ref 3.5–5.2)
ALP BLD-CCNC: 106 U/L (ref 35–104)
ALT SERPL-CCNC: 9 U/L (ref 0–32)
ANION GAP SERPL CALCULATED.3IONS-SCNC: 11 MMOL/L (ref 7–16)
ANISOCYTOSIS: ABNORMAL
AST SERPL-CCNC: 15 U/L (ref 0–31)
BASOPHILS ABSOLUTE: 0.07 E9/L (ref 0–0.2)
BASOPHILS RELATIVE PERCENT: 1.2 % (ref 0–2)
BILIRUB SERPL-MCNC: 0.3 MG/DL (ref 0–1.2)
BUN BLDV-MCNC: 34 MG/DL (ref 6–23)
CALCIUM SERPL-MCNC: 9.1 MG/DL (ref 8.6–10.2)
CHLORIDE BLD-SCNC: 101 MMOL/L (ref 98–107)
CO2: 30 MMOL/L (ref 22–29)
CREAT SERPL-MCNC: 6.7 MG/DL (ref 0.5–1)
EOSINOPHILS ABSOLUTE: 0.2 E9/L (ref 0.05–0.5)
EOSINOPHILS RELATIVE PERCENT: 3.4 % (ref 0–6)
GFR AFRICAN AMERICAN: 7
GFR NON-AFRICAN AMERICAN: 6 ML/MIN/1.73
GLUCOSE BLD-MCNC: 78 MG/DL (ref 74–99)
HCT VFR BLD CALC: 37.1 % (ref 34–48)
HEMOGLOBIN: 10.8 G/DL (ref 11.5–15.5)
HYPOCHROMIA: ABNORMAL
IMMATURE GRANULOCYTES #: 0.02 E9/L
IMMATURE GRANULOCYTES %: 0.3 % (ref 0–5)
LYMPHOCYTES ABSOLUTE: 1.48 E9/L (ref 1.5–4)
LYMPHOCYTES RELATIVE PERCENT: 24.8 % (ref 20–42)
MCH RBC QN AUTO: 34 PG (ref 26–35)
MCHC RBC AUTO-ENTMCNC: 29.1 % (ref 32–34.5)
MCV RBC AUTO: 116.7 FL (ref 80–99.9)
MONOCYTES ABSOLUTE: 0.78 E9/L (ref 0.1–0.95)
MONOCYTES RELATIVE PERCENT: 13.1 % (ref 2–12)
NEUTROPHILS ABSOLUTE: 3.41 E9/L (ref 1.8–7.3)
NEUTROPHILS RELATIVE PERCENT: 57.2 % (ref 43–80)
PDW BLD-RTO: 17 FL (ref 11.5–15)
PLATELET # BLD: 150 E9/L (ref 130–450)
PMV BLD AUTO: 10 FL (ref 7–12)
POTASSIUM SERPL-SCNC: 4.9 MMOL/L (ref 3.5–5)
RBC # BLD: 3.18 E12/L (ref 3.5–5.5)
SODIUM BLD-SCNC: 142 MMOL/L (ref 132–146)
TOTAL PROTEIN: 6.6 G/DL (ref 6.4–8.3)
WBC # BLD: 6 E9/L (ref 4.5–11.5)

## 2022-08-05 LAB
ALBUMIN SERPL-MCNC: 3.1 G/DL (ref 3.5–5.2)
ALP BLD-CCNC: 109 U/L (ref 35–104)
ALT SERPL-CCNC: 8 U/L (ref 0–32)
ANION GAP SERPL CALCULATED.3IONS-SCNC: 12 MMOL/L (ref 7–16)
AST SERPL-CCNC: 18 U/L (ref 0–31)
BILIRUB SERPL-MCNC: 0.2 MG/DL (ref 0–1.2)
BUN BLDV-MCNC: 33 MG/DL (ref 6–23)
CALCIUM SERPL-MCNC: 9.7 MG/DL (ref 8.6–10.2)
CHLORIDE BLD-SCNC: 102 MMOL/L (ref 98–107)
CO2: 29 MMOL/L (ref 22–29)
CREAT SERPL-MCNC: 7.1 MG/DL (ref 0.5–1)
GFR AFRICAN AMERICAN: 7
GFR NON-AFRICAN AMERICAN: 6 ML/MIN/1.73
GLUCOSE BLD-MCNC: 111 MG/DL (ref 74–99)
HCT VFR BLD CALC: 38 % (ref 34–48)
HEMOGLOBIN: 11.6 G/DL (ref 11.5–15.5)
MCH RBC QN AUTO: 34 PG (ref 26–35)
MCHC RBC AUTO-ENTMCNC: 30.5 % (ref 32–34.5)
MCV RBC AUTO: 111.4 FL (ref 80–99.9)
PDW BLD-RTO: 16 FL (ref 11.5–15)
PLATELET # BLD: 163 E9/L (ref 130–450)
PMV BLD AUTO: 9.9 FL (ref 7–12)
POTASSIUM SERPL-SCNC: 4.7 MMOL/L (ref 3.5–5)
RBC # BLD: 3.41 E12/L (ref 3.5–5.5)
SODIUM BLD-SCNC: 143 MMOL/L (ref 132–146)
TOTAL PROTEIN: 6.9 G/DL (ref 6.4–8.3)
WBC # BLD: 4.9 E9/L (ref 4.5–11.5)

## 2022-08-11 LAB — POTASSIUM SERPL-SCNC: 4.7 MMOL/L (ref 3.5–5)

## 2022-08-18 NOTE — DISCHARGE SUMMARY
Admit Date: 7/8/2022  8:30 PM   Discharge Date: 7/14/2022    Patient Active Problem List   Diagnosis    Neurologic gait dysfunction    Renal failure, acute on chronic ([facility])    Diabetes mellitus ([address]    Hypertension    Arthritis    Kidney disease    Knee problem    Anemia due to stage 3 chronic kidney disease    Primary osteoarthritis of both knees    Acute on chronic renal insufficiency    Acute kidney injury superimposed on CKD ([address]    Acute renal failure superimposed on chronic kidney disease, on chronic dialysis ([facility])    Moderate protein-calorie malnutrition ([facility])    Hyperkalemia    PERLA (acute kidney injury) ([address]    Stage 5 chronic kidney disease on chronic dialysis ([facility])    Pressure injury of sacral region, stage 4 ([address]    Pressure injury of right hip, stage 3 ([facility])    Unresponsive episode    Left bundle branch block    Pure hypercholesterolemia    Moderate obesity    Failure to thrive in adult        Present on Admission:   Failure to thrive in adult   Acute renal failure superimposed on chronic kidney disease, on chronic dialysis ([address]   Neurologic gait dysfunction   Hyperkalemia          Medication List        START taking these medications      acetic acid 0.25 % irrigation  Irrigate with as directed daily. epoetin derek-epbx 47875 UNIT/ML Soln injection  Commonly known as: RETACRIT  Inject 1 mL into the skin three times a week     glucose 4 g chewable tablet  Take 4 tablets by mouth as needed for Low blood sugar     insulin glargine 100 UNIT/ML injection vial  Commonly known as: LANTUS  Inject 6 Units into the skin nightly  Replaces: Basaglar KwikPen 100 UNIT/ML injection pen     prochlorperazine 5 MG tablet  Commonly known as: COMPAZINE  Take 1 tablet by mouth every 6 hours as needed for Nausea            CHANGE how you take these medications      midodrine 5 MG tablet  Commonly known as: PROAMATINE  What changed: Another medication with the same name was removed.  Continue taking this medication, and follow the directions you see here. CONTINUE taking these medications      acetaminophen 500 MG tablet  Commonly known as: TYLENOL     Auryxia 1  MG(Fe) Tabs tablet  Generic drug: ferric citrate     bisacodyl 5 MG EC tablet  Commonly known as: DULCOLAX     * busPIRone 5 MG tablet  Commonly known as: BUSPAR     * busPIRone 10 MG tablet  Commonly known as: BUSPAR     docusate sodium 100 MG capsule  Commonly known as: COLACE     escitalopram 10 MG tablet  Commonly known as: LEXAPRO     gabapentin 300 MG capsule  Commonly known as: NEURONTIN     lidocaine 4 % cream  Commonly known as: LMX     Linzess 72 MCG Caps capsule  Generic drug: linaCLOtide     magnesium hydroxide 400 MG/5ML suspension  Commonly known as: MILK OF MAGNESIA     metoprolol succinate 25 MG extended release tablet  Commonly known as: TOPROL XL  Take 1 tablet by mouth daily     polyethylene glycol 17 g packet  Commonly known as: GLYCOLAX     senna 8.6 MG tablet  Commonly known as: SENOKOT     traZODone 50 MG tablet  Commonly known as: DESYREL     vitamin D 50 MCG (2000 UT) Tabs tablet  Commonly known as: CHOLECALCIFEROL  Take 1 tablet by mouth daily           * This list has 2 medication(s) that are the same as other medications prescribed for you. Read the directions carefully, and ask your doctor or other care provider to review them with you.                 STOP taking these medications      Basaglar KwikPen 100 UNIT/ML injection pen  Generic drug: insulin glargine  Replaced by: insulin glargine 100 UNIT/ML injection vial     glucagon 1 MG injection     insulin lispro 100 UNIT/ML injection vial  Commonly known as: HUMALOG     neomycin-bacitracin-polymyxin 5-400-5000 ointment     oxyCODONE-acetaminophen 7.5-325 MG per tablet  Commonly known as: PERCOCET     Tradjenta 5 MG tablet  Generic drug: linagliptin     Zofran ODT 4 MG disintegrating tablet  Generic drug: ondansetron            ASK your doctor about these medications oxyCODONE-acetaminophen 5-325 MG per tablet  Commonly known as: PERCOCET  Take 1 tablet by mouth every 8 hours as needed for Pain for up to 14 days. Ask about: Should I take this medication? Where to Get Your Medications        You can get these medications from any pharmacy    Bring a paper prescription for each of these medications  oxyCODONE-acetaminophen 5-325 MG per tablet       Information about where to get these medications is not yet available    Ask your nurse or doctor about these medications  acetic acid 0.25 % irrigation  epoetin derek-epbx 72913 UNIT/ML Soln injection  glucose 4 g chewable tablet  insulin glargine 100 UNIT/ML injection vial  prochlorperazine 5 MG tablet          Hospital Course/Procedures:a 79-year-old who was admitted on 7/8/2022 from home due to the fact she could not care for herself due to renal failure and severe osteoarthritis of her knees and obesity. Patient had been at a skilled nursing facility for quite some time and was finally discharged home only 2 days prior to readmission. Patient was unable to care for self and her family was also unable to care for her and brought her to the ER for admission. She had also missed 1 dialysis session. The patient was admitted ,she was dialyzed, PT OT and social service were consulted. Acute rehabilitation at [address] [facility] were both consulted but stated patient was not a candidate for acute rehabilitation. Therefore patient was accepted at [facility] and was discharged there in stable condition on 7/14/2022. Consultants Following:nephrology    [facility]    Follow-up:she will be followed by the facility physician and she will continue outpatient dialysis on Monday Wednesday and Friday.       Sabina Spain MD  8/18/2022  1:58 PM

## 2022-08-24 LAB
ALBUMIN SERPL-MCNC: 2.8 G/DL (ref 3.5–5.2)
ALP BLD-CCNC: 114 U/L (ref 35–104)
ALT SERPL-CCNC: 15 U/L (ref 0–32)
ANION GAP SERPL CALCULATED.3IONS-SCNC: 19 MMOL/L (ref 7–16)
AST SERPL-CCNC: 18 U/L (ref 0–31)
BILIRUB SERPL-MCNC: 0.2 MG/DL (ref 0–1.2)
BUN BLDV-MCNC: 97 MG/DL (ref 6–23)
CALCIUM SERPL-MCNC: 9.1 MG/DL (ref 8.6–10.2)
CHLORIDE BLD-SCNC: 98 MMOL/L (ref 98–107)
CO2: 23 MMOL/L (ref 22–29)
CREAT SERPL-MCNC: 10.8 MG/DL (ref 0.5–1)
GFR AFRICAN AMERICAN: 4
GFR NON-AFRICAN AMERICAN: 3 ML/MIN/1.73
GLUCOSE BLD-MCNC: 128 MG/DL (ref 74–99)
HCT VFR BLD CALC: 39.4 % (ref 34–48)
HEMOGLOBIN: 12.3 G/DL (ref 11.5–15.5)
MCH RBC QN AUTO: 34.7 PG (ref 26–35)
MCHC RBC AUTO-ENTMCNC: 31.2 % (ref 32–34.5)
MCV RBC AUTO: 111.3 FL (ref 80–99.9)
PDW BLD-RTO: 14.9 FL (ref 11.5–15)
PLATELET # BLD: 196 E9/L (ref 130–450)
PMV BLD AUTO: 10.4 FL (ref 7–12)
POTASSIUM SERPL-SCNC: 6.3 MMOL/L (ref 3.5–5)
RBC # BLD: 3.54 E12/L (ref 3.5–5.5)
SODIUM BLD-SCNC: 140 MMOL/L (ref 132–146)
TOTAL PROTEIN: 6.5 G/DL (ref 6.4–8.3)
WBC # BLD: 9.3 E9/L (ref 4.5–11.5)

## 2022-09-12 LAB
HCT VFR BLD CALC: 39 % (ref 34–48)
HEMOGLOBIN: 12.4 G/DL (ref 11.5–15.5)

## 2022-09-14 LAB
ALBUMIN SERPL-MCNC: 3.2 G/DL (ref 3.5–5.2)
ALP BLD-CCNC: 110 U/L (ref 35–104)
ALT SERPL-CCNC: 18 U/L (ref 0–32)
ANION GAP SERPL CALCULATED.3IONS-SCNC: 15 MMOL/L (ref 7–16)
AST SERPL-CCNC: 25 U/L (ref 0–31)
BILIRUB SERPL-MCNC: <0.2 MG/DL (ref 0–1.2)
BUN BLDV-MCNC: 67 MG/DL (ref 6–23)
CALCIUM SERPL-MCNC: 9.5 MG/DL (ref 8.6–10.2)
CHLORIDE BLD-SCNC: 99 MMOL/L (ref 98–107)
CO2: 26 MMOL/L (ref 22–29)
CREAT SERPL-MCNC: 7.3 MG/DL (ref 0.5–1)
GFR AFRICAN AMERICAN: 7
GFR NON-AFRICAN AMERICAN: 5 ML/MIN/1.73
GLUCOSE BLD-MCNC: 127 MG/DL (ref 74–99)
HCT VFR BLD CALC: 39.9 % (ref 34–48)
HEMOGLOBIN: 12.3 G/DL (ref 11.5–15.5)
MCH RBC QN AUTO: 34.5 PG (ref 26–35)
MCHC RBC AUTO-ENTMCNC: 30.8 % (ref 32–34.5)
MCV RBC AUTO: 111.8 FL (ref 80–99.9)
PDW BLD-RTO: 15.7 FL (ref 11.5–15)
PLATELET # BLD: 178 E9/L (ref 130–450)
PMV BLD AUTO: 10.3 FL (ref 7–12)
POTASSIUM SERPL-SCNC: 5.9 MMOL/L (ref 3.5–5)
RBC # BLD: 3.57 E12/L (ref 3.5–5.5)
SODIUM BLD-SCNC: 140 MMOL/L (ref 132–146)
TOTAL PROTEIN: 6.6 G/DL (ref 6.4–8.3)
WBC # BLD: 6.6 E9/L (ref 4.5–11.5)

## 2022-09-16 LAB
HCT VFR BLD CALC: 37.8 % (ref 34–48)
HEMOGLOBIN: 11.8 G/DL (ref 11.5–15.5)

## 2022-09-19 LAB
HCT VFR BLD CALC: 38.7 % (ref 34–48)
HEMOGLOBIN: 12.6 G/DL (ref 11.5–15.5)

## 2022-09-21 LAB
HCT VFR BLD CALC: 39.3 % (ref 34–48)
HEMOGLOBIN: 12.4 G/DL (ref 11.5–15.5)

## 2022-09-23 LAB
HCT VFR BLD CALC: 39.7 % (ref 34–48)
HEMOGLOBIN: 12.6 G/DL (ref 11.5–15.5)

## 2022-09-26 LAB
HCT VFR BLD CALC: 40.1 % (ref 34–48)
HEMOGLOBIN: 12.6 G/DL (ref 11.5–15.5)

## 2022-10-03 LAB
HCT VFR BLD CALC: 35.6 % (ref 34–48)
HEMOGLOBIN: 11 G/DL (ref 11.5–15.5)

## 2022-10-07 LAB — GRAM STAIN ORDERABLE: NORMAL

## 2022-10-09 LAB
ANAEROBIC CULTURE: NORMAL
WOUND/ABSCESS: NORMAL

## 2022-10-10 LAB
HCT VFR BLD CALC: 37.1 % (ref 34–48)
HEMOGLOBIN: 11.6 G/DL (ref 11.5–15.5)

## 2022-10-12 LAB
ALBUMIN SERPL-MCNC: 3 G/DL (ref 3.5–5.2)
ALP BLD-CCNC: 88 U/L (ref 35–104)
ALT SERPL-CCNC: 22 U/L (ref 0–32)
ANION GAP SERPL CALCULATED.3IONS-SCNC: 21 MMOL/L (ref 7–16)
AST SERPL-CCNC: 34 U/L (ref 0–31)
BILIRUB SERPL-MCNC: 0.2 MG/DL (ref 0–1.2)
BUN BLDV-MCNC: 61 MG/DL (ref 6–23)
CALCIUM SERPL-MCNC: 9.8 MG/DL (ref 8.6–10.2)
CHLORIDE BLD-SCNC: 100 MMOL/L (ref 98–107)
CO2: 21 MMOL/L (ref 22–29)
CREAT SERPL-MCNC: 6.6 MG/DL (ref 0.5–1)
GFR AFRICAN AMERICAN: 7
GFR NON-AFRICAN AMERICAN: 6 ML/MIN/1.73
GLUCOSE BLD-MCNC: 58 MG/DL (ref 74–99)
HCT VFR BLD CALC: 39 % (ref 34–48)
HEMOGLOBIN: 12.1 G/DL (ref 11.5–15.5)
MCH RBC QN AUTO: 35.3 PG (ref 26–35)
MCHC RBC AUTO-ENTMCNC: 31 % (ref 32–34.5)
MCV RBC AUTO: 113.7 FL (ref 80–99.9)
PDW BLD-RTO: 15.4 FL (ref 11.5–15)
PLATELET # BLD: 182 E9/L (ref 130–450)
PMV BLD AUTO: 10.6 FL (ref 7–12)
POTASSIUM SERPL-SCNC: 4.9 MMOL/L (ref 3.5–5)
RBC # BLD: 3.43 E12/L (ref 3.5–5.5)
SODIUM BLD-SCNC: 142 MMOL/L (ref 132–146)
TOTAL PROTEIN: 6.5 G/DL (ref 6.4–8.3)
WBC # BLD: 10.5 E9/L (ref 4.5–11.5)

## 2022-10-24 NOTE — DISCHARGE INSTRUCTIONS
Visit Discharge/Physician Orders    Discharge condition: Stable    Assessment of pain at discharge:minimal    Anesthetic used: 4% lidocaine    Discharge to: Home    Left via:Private automobile    Accompanied by: accompanied by       ECF/[facility]: [facility]    Dressing Orders: sacrum ulcer cleanse with normal saline apply alginate and dry dressing daily    Treatment Orders:  Keep pressure off wound  Turn and reposition every 2 hours     Eat foods high in protein and vitamin c    Multivitamin daily    [facility] followup visit ___1 week elena laboy__________________________  (Please note your next appointment above and if you are unable to keep, kindly give a 24 hour notice. Thank you.)    Physician signature:__________________________      If you experience any of the following, please call the [facility] during business hours:    * Increase in Pain  * Temperature over 101  * Increase in drainage from your wound  * Drainage with a foul odor  * Bleeding  * Increase in swelling  * Need for compression bandage changes due to slippage, breakthrough drainage. If you need medical attention outside of the business hours of the [facility] [address] [facility] [address] please contact your PCP or go to the nearest emergency room.

## 2022-10-25 ENCOUNTER — HOSPITAL ENCOUNTER (OUTPATIENT)
Dept: WOUND CARE | Age: 72
Discharge: HOME | End: 2022-10-25
Payer: MEDICAID

## 2022-10-25 VITALS
RESPIRATION RATE: 16 BRPM | DIASTOLIC BLOOD PRESSURE: 70 MMHG | WEIGHT: 192 LBS | BODY MASS INDEX: 36.25 KG/M2 | TEMPERATURE: 96.7 F | HEIGHT: 61 IN | HEART RATE: 72 BPM | SYSTOLIC BLOOD PRESSURE: 120 MMHG

## 2022-10-25 DIAGNOSIS — L89.213 PRESSURE INJURY OF RIGHT HIP, STAGE 3 (HCC): ICD-10-CM

## 2022-10-25 DIAGNOSIS — Z99.2 ESRD ON HEMODIALYSIS (HCC): ICD-10-CM

## 2022-10-25 DIAGNOSIS — N18.6 ESRD ON HEMODIALYSIS (HCC): ICD-10-CM

## 2022-10-25 DIAGNOSIS — L89.154 PRESSURE INJURY OF SACRAL REGION, STAGE 4 (HCC): Primary | ICD-10-CM

## 2022-10-25 DIAGNOSIS — L89.153 PRESSURE INJURY OF SACRAL REGION, STAGE 3 (HCC): ICD-10-CM

## 2022-10-25 DIAGNOSIS — R09.89 DECREASED DORSALIS PEDIS PULSE: ICD-10-CM

## 2022-10-25 PROBLEM — N28.9 ACUTE ON CHRONIC RENAL INSUFFICIENCY: Status: RESOLVED | Noted: 2020-01-31 | Resolved: 2022-10-25

## 2022-10-25 PROBLEM — N17.9 ACUTE KIDNEY INJURY SUPERIMPOSED ON CKD (HCC): Status: RESOLVED | Noted: 2020-01-31 | Resolved: 2022-10-25

## 2022-10-25 PROBLEM — N18.9 ACUTE KIDNEY INJURY SUPERIMPOSED ON CKD (HCC): Status: RESOLVED | Noted: 2020-01-31 | Resolved: 2022-10-25

## 2022-10-25 PROBLEM — N18.9 ACUTE ON CHRONIC RENAL INSUFFICIENCY: Status: RESOLVED | Noted: 2020-01-31 | Resolved: 2022-10-25

## 2022-10-25 PROBLEM — N17.9 AKI (ACUTE KIDNEY INJURY) (HCC): Status: RESOLVED | Noted: 2020-11-18 | Resolved: 2022-10-25

## 2022-10-25 PROBLEM — N17.9 ACUTE RENAL FAILURE SUPERIMPOSED ON CHRONIC KIDNEY DISEASE, ON CHRONIC DIALYSIS (HCC): Status: RESOLVED | Noted: 2020-04-11 | Resolved: 2022-10-25

## 2022-10-25 PROBLEM — E87.5 HYPERKALEMIA: Status: RESOLVED | Noted: 2020-10-09 | Resolved: 2022-10-25

## 2022-10-25 PROBLEM — N18.9 ACUTE RENAL FAILURE SUPERIMPOSED ON CHRONIC KIDNEY DISEASE, ON CHRONIC DIALYSIS (HCC): Status: RESOLVED | Noted: 2020-04-11 | Resolved: 2022-10-25

## 2022-10-25 PROCEDURE — 11042 DBRDMT SUBQ TIS 1ST 20SQCM/<: CPT | Performed by: SURGERY

## 2022-10-25 PROCEDURE — 99204 OFFICE O/P NEW MOD 45 MIN: CPT | Performed by: SURGERY

## 2022-10-25 PROCEDURE — 11042 DBRDMT SUBQ TIS 1ST 20SQCM/<: CPT

## 2022-10-25 RX ORDER — LIDOCAINE HYDROCHLORIDE 20 MG/ML
JELLY TOPICAL ONCE
Status: CANCELLED | OUTPATIENT
Start: 2022-10-25 | End: 2022-10-25

## 2022-10-25 RX ORDER — LIDOCAINE HYDROCHLORIDE 40 MG/ML
SOLUTION TOPICAL ONCE
Status: CANCELLED | OUTPATIENT
Start: 2022-10-25 | End: 2022-10-25

## 2022-10-25 RX ORDER — LIDOCAINE 50 MG/G
OINTMENT TOPICAL ONCE
Status: CANCELLED | OUTPATIENT
Start: 2022-10-25 | End: 2022-10-25

## 2022-10-25 RX ORDER — LIDOCAINE 40 MG/G
CREAM TOPICAL ONCE
Status: CANCELLED | OUTPATIENT
Start: 2022-10-25 | End: 2022-10-25

## 2022-10-25 RX ORDER — CLOBETASOL PROPIONATE 0.5 MG/G
OINTMENT TOPICAL ONCE
Status: CANCELLED | OUTPATIENT
Start: 2022-10-25 | End: 2022-10-25

## 2022-10-25 RX ORDER — BETAMETHASONE DIPROPIONATE 0.05 %
OINTMENT (GRAM) TOPICAL ONCE
Status: CANCELLED | OUTPATIENT
Start: 2022-10-25 | End: 2022-10-25

## 2022-10-25 RX ORDER — GINSENG 100 MG
CAPSULE ORAL ONCE
Status: CANCELLED | OUTPATIENT
Start: 2022-10-25 | End: 2022-10-25

## 2022-10-25 RX ORDER — BACITRACIN ZINC AND POLYMYXIN B SULFATE 500; 1000 [USP'U]/G; [USP'U]/G
OINTMENT TOPICAL ONCE
Status: CANCELLED | OUTPATIENT
Start: 2022-10-25 | End: 2022-10-25

## 2022-10-25 RX ORDER — GENTAMICIN SULFATE 1 MG/G
OINTMENT TOPICAL ONCE
Status: CANCELLED | OUTPATIENT
Start: 2022-10-25 | End: 2022-10-25

## 2022-10-25 RX ORDER — BACITRACIN, NEOMYCIN, POLYMYXIN B 400; 3.5; 5 [USP'U]/G; MG/G; [USP'U]/G
OINTMENT TOPICAL ONCE
Status: CANCELLED | OUTPATIENT
Start: 2022-10-25 | End: 2022-10-25

## 2022-10-25 NOTE — PLAN OF CARE
Problem: Chronic Conditions and Co-morbidities  Goal: Patient's chronic conditions and co-morbidity symptoms are monitored and maintained or improved  Outcome: Progressing     Problem: Wound:  Goal: Will show signs of wound healing; wound closure and no evidence of infection  Description: Will show signs of wound healing; wound closure and no evidence of infection  Outcome: Progressing     Problem: Pressure Ulcer:  Goal: Signs of wound healing will improve  Description: Signs of wound healing will improve  Outcome: Progressing  Goal: Absence of new pressure ulcer  Description: Absence of new pressure ulcer  Outcome: Progressing  Goal: Will show no infection signs and symptoms  Description: Will show no infection signs and symptoms  Outcome: Progressing

## 2022-10-25 NOTE — PROGRESS NOTES
Wound Healing Center /Hyperbarics   History and Physical/Consultation  Vascular    Referring Physician : Anner Baumgarten, MD  [address] [place] [address] RECORD NUMBER:  [id number]  AGE: 67 y.o. GENDER: female  : 1950  EPISODE DATE:  10/25/2022  Subjective:   No chief complaint on file. HISTORY of PRESENT ILLNESS DIANDRA León is a 67 y.o. female who presents today for wound/ulcer evaluation. History of Wound Context:  The patient has had a wound of the sacrococcygeal area which was first noted approximately 3 years ago. This has been treated nursing home facilities including [facility] wound care, has seen Dr. Pacheco Ma in the past, last year, also at the [facility], currently at home. On their initial visit to the wound healing center, 10/25/22, the patient has noted that the wound has not been improving. The patient has had similar previous wounds in the past.      Pt is currently on abx.   Was started on doxycycline 100 m p.o. twice daily this week by the nursing home facility    Patient denies any history of fever or chills    Patient also has end-stage renal disease currently on hemodialysis through AV fistula left upper arm placed by Dr. Tricia Hdz, tells me he underwent a few fistulogram's and angioplasties at the access center, recently had some ecchymosis due to infiltration of the needle    Patient was in the hospital, 2022, CT scan of abdomen pelvis that time revealed erosive changes along the sacrum and coccyx, was seen by Dr. Tori Pedroza at that time and was appropriately treated      Wound/Ulcer Pain Timing/Severity: constant  Quality of pain: dull, aching  Severity:  3 / 10   Modifying Factors: None  Associated Signs/Symptoms: drainage and pain    Ulcer Identification:  Ulcer Type: pressure and non-healing/non-surgical  Contributing Factors: diabetes, chronic pressure, and decreased mobility    Diabetic/Pressure/Non Pressure Ulcers only:  Ulcer: N/A    If patient has diabetic lower extremity wounds  Cobos Classification of diabetic lower extremity wounds:    Grade Description   []  0 No open wound   []  1 Superficial ulcer involving the full skin thickness   []  2 Deep ulcer involves ligament, tendon, joint capsule, or fascia  No bone involvement or abscess presence   []  3 Deep Ulcer with abcess formation and/or osteomyelitis   []  4 Localized gangrene   []  5 Extensive gangrene of the foot     Wound: Patient has a sacrococcygeal wound, with a fat layer exposed, I see no evidence of bone involvement at the present time    Other pertinent information:    1. Patient noted are [place] from placement of AV fistula for dialysis access    2.   Patient has been seen by Dr. Shabana Payan last year and also by Dr. Jonathan Parks this year      10/25/2022  Discussed the patient and nursing staff, to facilitate offloading mattresses at home as patient currently staying with her son  Patient was instructed, try to sleep on the sides, avoid prolonged sitting in a chair  The importance of nutrition, patient was advised to take multivitamin supplement as well as protein supplement  Also because of diminished pulses in the lower extremities, patient recommended ankle-brachial index for documentation of the vascular status  Also discussed with nursing staff, will request Dr. Shabana Payan to follow the patient for her expertise, also known to the patient      PAST MEDICAL HISTORY      Diagnosis Date    Anemia in chronic kidney disease     Anxiety and depression     Arthritis     Chronic pain syndrome     COVID-19 05/2020    COVID-19     Decreased dorsalis pedis pulse 10/25/2022    Diabetes mellitus ([address]     Dysphagia, oral phase     ESRD on hemodialysis ([address] 10/25/2022    GERD (gastroesophageal reflux disease)     Hemodialysis patient ([address]     M-W-F    Hyperkalemia     Hypertension     Hypertensive kidney disease with stage 4 chronic kidney disease ([address]     Insomnia Kidney stones     MDD (major depressive disorder)     Moderate protein-calorie malnutrition (HCC)     Morbid obesity (HCC)     Muscle weakness (generalized)     Obesity     Pressure injury of sacral region, stage 3 ([address] 10/25/2022    Pressure ulcer of sacral region     Sacral pressure ulcer 2021    stage 4    Unspecified osteoarthritis, unspecified site     Weakness generalized      Past Surgical History:   Procedure Laterality Date    ABDOMEN SURGERY N/A 2020    SACRAL WOUND DEBRIDEMENT CALL   WITH TIME AVAIL AM performed by Delisa Resendiz MD at [address]   x3    CHOLECYSTECTOMY  3/31/16    Laparoscopic-Dr. Michel Ryan       for kidney stones    DIALYSIS FISTULA CREATION Left 2021    INSERTION FISTULA LEFT ARM performed by Payal Dejesus MD at [address] [place] [address] Road [address] Right 2021    REVISION AV FISTULA LEFT ARM performed by Payal Dejesus MD at [place] [address]     right     UPPER GASTROINTESTINAL ENDOSCOPY  2.18.15    Dr. Jennie Dill Findings: Mild Gastrits and Duodenitis, 2cm Hiatal Hernia    UPPER GASTROINTESTINAL ENDOSCOPY N/A 2020    EGD CONTROL HEMORRHAGE performed by Delisa Resendiz MD at [address] [place] [address] N/A 2020    EGD BEDSIDE performed by Luis Antonio Nelson MD at [address] [place] [address] N/A 2021    INSERTION TUNNELED DIALYSIS CATHETER performed by Payal Dejesus MD at [facility] OR     Family History   Problem Relation Age of Onset    Cancer Sister      Social History     Tobacco Use    Smoking status: Former     Packs/day: 1.00     Years: 30.00     Pack years: 30.00     Types: Cigarettes     Quit date: 2011     Years since quittin.2    Smokeless tobacco: Never   Vaping Use    Vaping Use: Never used   Substance Use Topics    Alcohol use: No    Drug use: No     No Known Allergies  Current Outpatient Medications on File Prior to Encounter   Medication Sig Dispense Refill    insulin glargine (LANTUS) 100 UNIT/ML injection vial Inject 6 Units into the skin nightly 10 mL 3    glucose 4 g chewable tablet Take 4 tablets by mouth as needed for Low blood sugar 60 tablet 3    prochlorperazine (COMPAZINE) 5 MG tablet Take 1 tablet by mouth every 6 hours as needed for Nausea 120 tablet 3    epoetin derek-epbx (RETACRIT) 57774 UNIT/ML SOLN injection Inject 1 mL into the skin three times a week 6.6 mL     lidocaine (LMX) 4 % cream Apply topically three times a week Apply topically as needed Mon/Wed/Fri prior to dialysis treatment      busPIRone (BUSPAR) 10 MG tablet Take 10 mg by mouth 2 times daily      traZODone (DESYREL) 50 MG tablet Take 25 mg by mouth nightly as needed for Sleep  (Patient not taking: No sig reported)      acetaminophen (TYLENOL) 500 MG tablet Take 1,000 mg by mouth every 8 hours as needed for Pain (moderate pain 4-6)       Ferric Citrate (AURYXIA) 1  MG(Fe) TABS Take 2 tablets by mouth 3 times daily (with meals) Take with meals. (Patient not taking: No sig reported)      midodrine (PROAMATINE) 5 MG tablet Take 15 mg by mouth three times a week M-W-F  (Patient not taking: No sig reported)      bisacodyl (DULCOLAX) 5 MG EC tablet Take 10 mg by mouth daily as needed for Constipation (Patient not taking: No sig reported)      Vitamin D (CHOLECALCIFEROL) 50 MCG (2000 UT) TABS tablet Take 1 tablet by mouth daily (Patient not taking: No sig reported) 60 tablet     magnesium hydroxide (MILK OF MAGNESIA) 400 MG/5ML suspension Take 30 mLs by mouth daily as needed for Constipation  (Patient not taking: No sig reported)      metoprolol succinate (TOPROL XL) 25 MG extended release tablet Take 1 tablet by mouth daily (Patient not taking: No sig reported) 30 tablet 3    escitalopram (LEXAPRO) 10 MG tablet Take 10 mg by mouth daily  (Patient not taking: No sig reported)      gabapentin (NEURONTIN) 300 MG capsule Take 300 mg by mouth 3 times daily.   (Patient not taking: No sig reported)      busPIRone (BUSPAR) 5 MG tablet Take 5 mg by mouth at bedtime       polyethylene glycol (GLYCOLAX) 17 g packet Take 17 g by mouth daily (Patient not taking: Reported on 7/9/2022)      senna (SENOKOT) 8.6 MG tablet Take 2 tablets by mouth 2 times daily        No current facility-administered medications on file prior to encounter.        REVIEW OF SYSTEMS   ROS : All others Negative if blank [], Positive if [x]  General Urinary   [] Fevers [] Hematuria   [] Chills [] Dysuria   [] Weight Loss Vascular   Skin [] Claudication   [x] Tissue Loss [] Rest Pain   Eyes Neurologic   [] Wears Glasses/Contacts [] Stroke/TIA   [] Vision Changes [] Focal weakness   Respiratory [] Slurred Speech    [] Shortness of breath ENT   Cardiovascular [] Difficulty swallowing   [] Chest Pain Gastrointestinal   [] Shortness of breath with exertion [] Abdominal Pain   Patient has a longstanding sacrococcygeal decubitus ulcer, fat layer exposed, in the past, osteomyelitis of the sacrum was suspected, diabetes mellitus, end-stage renal disease currently on hemodialysis, decreased mobility, walks short distances with the help of a walker, hypertension, hyperlipidemia, generalized osteoarthritis [] Melena       [] Hematochezia               Objective:    /70   Pulse 72   Temp (!) 96.7 °F (35.9 °C) (Temporal)   Resp 16   Ht 5' 1\" (1.549 m)   Wt 192 lb (87.1 kg)   BMI 36.28 kg/m²   Wt Readings from Last 3 Encounters:   10/25/22 192 lb (87.1 kg)   07/14/22 215 lb 2.7 oz (97.6 kg)   04/13/22 203 lb 4.2 oz (92.2 kg)       PHYSICAL EXAM  CONSTITUTIONAL:   Awake, alert, cooperative  PSYCHIATRIC :  Oriented to time, place and person      normal insight to disease process  ENT:  External ears and nose without lesions    Hearing deficits is not noted  NECK: Supple, symmetrical, trachea midline    Thyroid goiter not appreciated    Carotid bruit is not noted bilaterally  LUNGS:  No increased work of breathing Clear to auscultation bilaterally   CARDIOVASCULAR:  regular rate and rhythm   ABDOMEN:  soft, non-distended, non-tender    Hernias is not noted   Aorta is not palpable   Lymphatics : Cervical lymphadenopathy is not noted     Femoral lymphadenopathy is not noted  SKIN:   Skin color is normal   Texture and turgor is  normal   Induration is not noted  EXTREMITIES:   R UE Edema is not noted  L UE Edema is not noted  R LE Edema is not noted  L LE Edema is not noted      Patient does have AV fistula of the left upper arm with some ecchymosis, bruit noted      R femoral 1 L femoral 1   R dorsalis pedis 1 L dorsalis pedis 1   R posterior tibial 1 L posterior tibial 1     Assessment:     Problem List Items Addressed This Visit          High    Pressure injury of sacral region, stage 3 (HCC)       Medium    Decreased dorsalis pedis pulse    Relevant Orders    VL BRITTANEY BILATERAL LIMITED 1-2 LEVELS    ESRD on hemodialysis (HCC)       Unprioritized    Pressure injury of right hip, stage 3 (HCC)    Pressure injury of sacral region, stage 4 ([address] - Primary     Procedure Note  Indications:  Based on my examination of this patient's wound(s)/ulcer(s) today, debridement is required to promote healing and evaluate the wound base. Performed by: Fallon Ward MD    Consent obtained:  Yes    Time out taken:  Yes    Pain Control: Anesthetic  Anesthetic: 4% Lidocaine Liquid Topical     Debridement:Excisional Debridement    Using curette the wound(s)/ulcer(s) was/were sharply debrided down through and including the removal of epidermis, dermis, and subcutaneous tissue.         Devitalized Tissue Debrided:  fibrin and slough    Pre Debridement Measurements:  Are located in the [place]  Documentation Flow Sheet    Wound/Ulcer #: 1    Post Debridement Measurements:  Wound/Ulcer Descriptions are Pre Debridement except measurements:    Wound 12/06/21 Sacrum in fold (Active)   Number of days: 322       Wound 07/09/22 Coccyx (Active)   Number of days: 108       Wound 10/25/22 Coccyx #1 (Active)   Wound Image   10/25/22 1051   Dressing Status New dressing applied 10/25/22 1141   Wound Cleansed Cleansed with saline 10/25/22 1141   Dressing/Treatment Alginate;Dry dressing 10/25/22 1141   Wound Length (cm) 6.6 cm 10/25/22 1051   Wound Width (cm) 2.3 cm 10/25/22 1051   Wound Depth (cm) 1.4 cm 10/25/22 1051   Wound Surface Area (cm^2) 15.18 cm^2 10/25/22 1051   Wound Volume (cm^3) 21.252 cm^3 10/25/22 1051   Post-Procedure Length (cm) 6.6 cm 10/25/22 1127   Post-Procedure Width (cm) 2.3 cm 10/25/22 1127   Post-Procedure Depth (cm) 1.5 cm 10/25/22 1127   Post-Procedure Surface Area (cm^2) 15.18 cm^2 10/25/22 1127   Post-Procedure Volume (cm^3) 22.77 cm^3 10/25/22 1127   Undermining Starts ___ O'Clock 3 10/25/22 1051   Undermining Ends___ O'Clock 5 10/25/22 1051   Undermining Maxium Distance (cm) [email or web address] 10/25/22 1051   Wound Assessment Fibrin;Pink/red 10/25/22 1051   Drainage Amount Large 10/25/22 1051   Drainage Description Serosanguinous 10/25/22 1051   Odor None 10/25/22 1051   Carmen-wound Assessment Blanchable erythema; Maceration 10/25/22 1051   Number of days: 0       Percent of Wound/Ulcer Debrided: 80%    Total Surface Area Debrided:  12 sq cm     Estimated Blood Loss:  Minimal    Hemostasis Achieved:  by pressure    Procedural Pain:  4  / 10     Post Procedural Pain:  3 / 10     Response to treatment:  Well tolerated by patient. A culture was not done. Plan:     Pt is not a smoker   -  In my professional opinion and based off the information that is available at this time this patient has appropriate indication for HBO Therapy: No    Treatment Note please see attached Discharge Instructions    Written patient dismissal instructions given to patient and signed by patient or POA.          Discharge Instructions         Visit Discharge/Physician Orders    Discharge condition: Stable    Assessment of pain at discharge:minimal    Anesthetic used: 4% lidocaine    Discharge to: Home    Left via:Private automobile    Accompanied by: accompanied by       ECF[facility]HHA: [facility]    Dressing Orders: sacrum ulcer cleanse with normal saline apply alginate and dry dressing daily    Treatment Orders:  Keep pressure off wound  Turn and reposition every 2 hours     Eat foods high in protein and vitamin c    Multivitamin daily    [address] [place] [address],[address] followup visit ___1 week elena laboy__________________________  (Please note your next appointment above and if you are unable to keep, kindly give a 24 hour notice. Thank you.)    Physician signature:__________________________      If you experience any of the following, please call the [facility] during business hours:    * Increase in Pain  * Temperature over 101  * Increase in drainage from your wound  * Drainage with a foul odor  * Bleeding  * Increase in swelling  * Need for compression bandage changes due to slippage, breakthrough drainage. If you need medical attention outside of the business hours of the [facility] please contact your PCP or go to the nearest emergency room.          Electronically signed by Carly Asencio MD on 10/25/2022 at 11:48 AM

## 2022-10-25 NOTE — PROGRESS NOTES
Patient will require hospital bed with low air loss mattress due to decubitus ulcer of sacrum stage III,and  decreased mobility. She will require side rails for turning and repositioning. Home health referral made with [address].

## 2022-10-26 NOTE — DISCHARGE INSTRUCTIONS
Visit Discharge/Physician Orders     Discharge condition: Stable     Assessment of pain at discharge:minimal     Anesthetic used: 4% lidocaine     Discharge to: Home     Left via:Private automobile     Accompanied by: accompanied by         ECF/HHA: consult home care     Dressing Orders: sacrum ulcer: cleanse with normal saline, pack with plain alginate and cover with dry dressing daily. Treatment Orders:  Keep pressure off wound  Turn and reposition every 2 hours      Eat foods high in protein and vitamin c     Multivitamin daily  Ordered hospital bed through [address] followup visit _______1 week elena laboy__________________________  (Please note your next appointment above and if you are unable to keep, kindly give a 24 hour notice. Thank you.)     Physician signature:__________________________        If you experience any of the following, please call the [facility] during business hours:     * Increase in Pain  * Temperature over 101  * Increase in drainage from your wound  * Drainage with a foul odor  * Bleeding  * Increase in swelling  * Need for compression bandage changes due to slippage, breakthrough drainage. If you need medical attention outside of the business hours of the [facility] please contact your PCP or go to the nearest emergency room.

## 2022-10-26 NOTE — PROGRESS NOTES
[address] [facility],[address]:     [facility] [address] [place], [place]  H:[phone] f: [address]:     [address]  [address]  [place] [address]  [phone]  WOUND CARE Dept: [address] NUMBER     Patient Information:      Malik Downs  [facility] [address]   [phone]   : 1950  AGE: 67 y.o. GENDER: female   EPISODE DATE: 2022    Insurance:      PRIMARY INSURANCE:  Plan:   Coverage:   Effective Date:   Group Number: [unfilled]  Subscriber Number: [id number] - (Medicare Managed)    Payer/Plan Subscr  Sex Relation Sub. Ins. ID Effective Group Num   1.  [facility] MEDICARE * FLAKITA IYER 1950 Female Self [id number]                                    [address] [address]       Patient Wound Information:      Problem List Items Addressed This Visit    None      WOUNDS REQUIRING DRESSING SUPPLIES:     Wound 21 Sacrum in fold (Active)   Number of days: 324       Wound 22 Coccyx (Active)   Number of days: 109       Wound 10/25/22 Coccyx #1 (Active)   Wound Image   10/25/22 1051   Dressing Status New dressing applied 10/25/22 1141   Wound Cleansed Cleansed with saline 10/25/22 1141   Dressing/Treatment Alginate;Dry dressing 10/25/22 1141   Wound Length (cm) 6.6 cm 10/25/22 1051   Wound Width (cm) 2.3 cm 10/25/22 1051   Wound Depth (cm) 1.4 cm 10/25/22 1051   Wound Surface Area (cm^2) 15.18 cm^2 10/25/22 1051   Wound Volume (cm^3) 21.252 cm^3 10/25/22 1051   Post-Procedure Length (cm) 6.6 cm 10/25/22 1127   Post-Procedure Width (cm) 2.3 cm 10/25/22 1127   Post-Procedure Depth (cm) 1.5 cm 10/25/22 1127   Post-Procedure Surface Area (cm^2) 15.18 cm^2 10/25/22 1127   Post-Procedure Volume (cm^3) 22.77 cm^3 10/25/22 1127   Undermining Starts ___ O'Clock 3 10/25/22 1051   Undermining Ends___ O'Clock 5 10/25/22 1051   Undermining Maxium Distance (cm) [email or web address] 10/25/22 1051   Wound Assessment Fibrin;Pink/red 10/25/22 1051   Drainage Amount Large 10/25/22 1051   Drainage Description Serosanguinous 10/25/22 1051   Odor None 10/25/22 1051   Carmen-wound Assessment Blanchable erythema; Maceration 10/25/22 1051   Number of days: 1     Incision 11/18/21 Brachial Anterior; Distal; Left (Active)   Number of days: 852       Supplies Requested :      WOUND #: 1   PRIMARY DRESSING:  Alginate pad   Cover and Secure with: 4X4 gauze pad  ABD pad  Other excel SAP     FREQUENCY OF DRESSING CHANGES:  Daily           ADDITIONAL ITEMS:  [] Gloves Small  [x] Gloves Medium [] Gloves Large [] Gloves XLarge  [] Tape 1\" [x] Tape 2\" [] Tape 3\"  [] Medipore Tape  [x] Saline  [] Skin Prep   [] Adhesive Remover   [x] Cotton Tip Applicators   [] Other:    Patient Wound(s) Debrided: [x] Yes if yes please add date 10/25/22   [] No    Debribement Type: Excisional/Sharp    Is the patient currently on an antibiotic for their Wound(s): [] Yes if yes please add name and dose    [x] No    Patient currently being seen by Home Health: [] Yes   [x] No    Duration for needed supplies:  []15  [x]30  []60  []90 Days    Electronically signed by Kaitlin Rendon RN on 11/1/2022 at 2:34 PM     Provider Information:      PROVIDER'S NAME: Luis Copeland    NPI:  [id number]

## 2022-10-28 NOTE — PROGRESS NOTES
Spoke with Kerry De Jesus, supplies will be outsourced to [employer] per patient insurance. Supplies due to arrive next week.

## 2022-10-31 NOTE — PROGRESS NOTES
Hospitalist Progress Note      PCP: Fabian Avina MD    Date of Admission: 5/3/2020    Hospital Course: 65-year-old female who presented with confusion. She had recently tested positive for COVID. She was also noted to have worsening sacral decubitus ulcer status post debridement on 5/3/2020. Subjective:  Patient asking to get out of bed. She had diarrhea with blood today. She is fatigued.     Medications:  Reviewed    Infusion Medications    dextrose      sodium chloride 75 mL/hr at 05/07/20 1249     Scheduled Medications    lidocaine PF  5 mL Intradermal Once    heparin flush  3 mL Intravenous 2 times per day    sodium chloride  25 mL Intravenous Q12H    insulin lispro  0-18 Units Subcutaneous Q4H    gabapentin  100 mg Oral TID    vancomycin (VANCOCIN) intermittent dosing (placeholder)   Other RX Placeholder    epoetin derek-epbx  3,000 Units Subcutaneous Once per day on Mon Wed Fri    amLODIPine  10 mg Oral Daily    calcitRIOL  0.25 mcg Oral Daily    calcium acetate  1,334 mg Oral TID WC    hydrALAZINE  50 mg Oral TID    sodium bicarbonate  1,300 mg Oral BID    vitamin C  500 mg Oral Daily    zinc  50 mg Oral Daily    heparin (porcine)  5,000 Units Subcutaneous Q8H    piperacillin-tazobactam  3.375 g Intravenous Q12H     PRN Meds: trimethobenzamide, sodium chloride flush, heparin flush, HYDROmorphone, sodium chloride flush, glucose, dextrose, glucagon (rDNA), dextrose, oxyCODONE-acetaminophen **OR** oxyCODONE-acetaminophen, ALPRAZolam, polyethylene glycol, acetaminophen      Intake/Output Summary (Last 24 hours) at 5/8/2020 1031  Last data filed at 5/8/2020 4754  Gross per 24 hour   Intake 2549.58 ml   Output 1100 ml   Net 1449.58 ml       Physical Exam Performed:    BP (!) 128/58   Pulse 107   Temp 98.1 °F (36.7 °C) (Temporal)   Resp 18   Ht 5' 1\" (1.549 m)   Wt 232 lb (105.2 kg)   SpO2 95%   BMI 43.84 kg/m²     General appearance: No apparent distress, appears stated age and congestion. Assessment/Plan:    Active Hospital Problems    Diagnosis    Sepsis ([address] [A41.9]   #Infected sacral pressure decubitus ulcer that is post bedside sharp excisional debridement on  5/3/2020 and repeat excisional debridement of skin, subcutaneous tissue, muscle and fascia of sacrum and gluteal region on 5/4/2020  Enterococcus avium and Proteus mirabilis on cultures  -Appreciate surgery recommendations. Recommendations for outpatient referral to follow up with [facility] in 1-2 weeks after discharge. Wound care recommendations noted. -Appreciate ID recommendations. Continue Zosyn and vancomycin. Vancomycin levels supratherapeutic. Pharmacy is monitoring levels. PICC line to be placed to replace central line  -Recommendations for diverting colostomy from ID  -Continue pain control    #COVID-19 infection-no pneumonia on chest x-ray  -She has completed Plaquenil. Continue vitamin C and zinc.  -Continues to be afebrile and on room air  -Procalcitonin likely elevated due to infected decubitus ulcer    #Sepsis present on admission- due to COVID-19. Resolved. # Diarrhea with blood-check C.diff. Will ask Surgery to weigh in    #Advanced chronic kidney disease-creatinine stable. UOP was not decreased but rather nicole had been kinked after pt was proned.  -As per nephrology. Patient approaching ESRD. No plans for dialysis. She is on IVF    #Elevated potassium-? Veltassa needed. I added low potassium diet restriction. #Acute metabolic and sepsis encephalopathy-?uremia or decreased clearance of medications. Resolved. -Continue reduced dose of gabapentin  -PRN Ativan and Percocet with caution    #Acute on chronic anemia-likely due to advanced CKD. -ARUN per nephrology. H&H stable. Received Epogen this admission. #Diabetes-continue insulin coverage    #Nausea/vomitting-?uremia. Her abdomen is soft and has bowel soundsContinue tigan due to QT prolongation.     #QT No

## 2022-11-01 ENCOUNTER — HOSPITAL ENCOUNTER (OUTPATIENT)
Dept: WOUND CARE | Age: 72
Discharge: HOME | End: 2022-11-01
Payer: MEDICARE

## 2022-11-01 VITALS
HEIGHT: 61 IN | HEART RATE: 70 BPM | RESPIRATION RATE: 16 BRPM | DIASTOLIC BLOOD PRESSURE: 60 MMHG | SYSTOLIC BLOOD PRESSURE: 110 MMHG | TEMPERATURE: 96 F | WEIGHT: 192 LBS | BODY MASS INDEX: 36.25 KG/M2

## 2022-11-01 DIAGNOSIS — L89.213 PRESSURE INJURY OF RIGHT HIP, STAGE 3 (HCC): ICD-10-CM

## 2022-11-01 DIAGNOSIS — L89.154 PRESSURE INJURY OF SACRAL REGION, STAGE 4 (HCC): Primary | ICD-10-CM

## 2022-11-01 PROCEDURE — 11042 DBRDMT SUBQ TIS 1ST 20SQCM/<: CPT | Performed by: SURGERY

## 2022-11-01 PROCEDURE — 11042 DBRDMT SUBQ TIS 1ST 20SQCM/<: CPT

## 2022-11-01 RX ORDER — LIDOCAINE HYDROCHLORIDE 20 MG/ML
JELLY TOPICAL ONCE
OUTPATIENT
Start: 2022-11-01 | End: 2022-11-01

## 2022-11-01 RX ORDER — GINSENG 100 MG
CAPSULE ORAL ONCE
OUTPATIENT
Start: 2022-11-01 | End: 2022-11-01

## 2022-11-01 RX ORDER — BACITRACIN ZINC AND POLYMYXIN B SULFATE 500; 1000 [USP'U]/G; [USP'U]/G
OINTMENT TOPICAL ONCE
OUTPATIENT
Start: 2022-11-01 | End: 2022-11-01

## 2022-11-01 RX ORDER — CLOBETASOL PROPIONATE 0.5 MG/G
OINTMENT TOPICAL ONCE
OUTPATIENT
Start: 2022-11-01 | End: 2022-11-01

## 2022-11-01 RX ORDER — GENTAMICIN SULFATE 1 MG/G
OINTMENT TOPICAL ONCE
OUTPATIENT
Start: 2022-11-01 | End: 2022-11-01

## 2022-11-01 RX ORDER — LIDOCAINE HYDROCHLORIDE 40 MG/ML
SOLUTION TOPICAL ONCE
OUTPATIENT
Start: 2022-11-01 | End: 2022-11-01

## 2022-11-01 RX ORDER — BETAMETHASONE DIPROPIONATE 0.05 %
OINTMENT (GRAM) TOPICAL ONCE
OUTPATIENT
Start: 2022-11-01 | End: 2022-11-01

## 2022-11-01 RX ORDER — BACITRACIN, NEOMYCIN, POLYMYXIN B 400; 3.5; 5 [USP'U]/G; MG/G; [USP'U]/G
OINTMENT TOPICAL ONCE
OUTPATIENT
Start: 2022-11-01 | End: 2022-11-01

## 2022-11-01 RX ORDER — LIDOCAINE 50 MG/G
OINTMENT TOPICAL ONCE
OUTPATIENT
Start: 2022-11-01 | End: 2022-11-01

## 2022-11-01 RX ORDER — LIDOCAINE HYDROCHLORIDE 40 MG/ML
SOLUTION TOPICAL ONCE
Status: DISCONTINUED | OUTPATIENT
Start: 2022-11-01 | End: 2022-11-02 | Stop reason: HOSPADM

## 2022-11-01 RX ORDER — LIDOCAINE 40 MG/G
CREAM TOPICAL ONCE
OUTPATIENT
Start: 2022-11-01 | End: 2022-11-01

## 2022-11-01 NOTE — PLAN OF CARE
Problem: Chronic Conditions and Co-morbidities  Goal: Patient's chronic conditions and co-morbidity symptoms are monitored and maintained or improved  Outcome: Progressing     Problem: Chronic Conditions and Co-morbidities  Goal: Patient's chronic conditions and co-morbidity symptoms are monitored and maintained or improved  Outcome: Progressing     Problem: Wound:  Goal: Will show signs of wound healing; wound closure and no evidence of infection  Description: Will show signs of wound healing; wound closure and no evidence of infection  Outcome: Progressing     Problem: Pressure Ulcer:  Goal: Signs of wound healing will improve  Description: Signs of wound healing will improve  Outcome: Progressing  Goal: Absence of new pressure ulcer  Description: Absence of new pressure ulcer  Outcome: Progressing  Goal: Will show no infection signs and symptoms  Description: Will show no infection signs and symptoms  Outcome: Progressing

## 2022-11-01 NOTE — PROGRESS NOTES
Wound Care Center Follow-Up Progress Note    Marisela SANTOS Ray  AGE: 67 y.o. GENDER: female  : 1950    TODAY'S DATE:  2022    Subjective:    Carol Lucia is a 67 y.o. female who presents today for follow-up examination and treatment of a delayed-healing wound(s). The patient denies any problems since last visit. Objective:    /60   Pulse 70   Temp (!) 96 °F (35.6 °C) (Temporal)   Resp 16   Ht 5' 1\" (1.549 m)   Wt 192 lb (87.1 kg)   BMI 36.28 kg/m²     (Wound Reference Date is when first assessed. Measurements shown are from today's visit.)    Wound 21 Sacrum in fold (Active)   Number of days: 330       Wound 22 Coccyx (Active)   Number of days: 115       Wound 10/25/22 Coccyx #1 (Active)   Wound Image   10/25/22 1051   Dressing Status New dressing applied 10/25/22 1141   Wound Cleansed Cleansed with saline 10/25/22 1141   Dressing/Treatment Alginate;Dry dressing 10/25/22 1141   Wound Length (cm) 7 cm 22 1503   Wound Width (cm) 2.6 cm 22 1503   Wound Depth (cm) 1.8 cm 22 1503   Wound Surface Area (cm^2) 18.2 cm^2 22 1503   Change in Wound Size % (l*w) -19.89 22 1503   Wound Volume (cm^3) 32.76 cm^3 22 1503   Wound Healing % -54 22 1503   Post-Procedure Length (cm) 7.1 cm 22 1542   Post-Procedure Width (cm) 2.7 cm 22 1542   Post-Procedure Depth (cm) 1.9 cm 22 1542   Post-Procedure Surface Area (cm^2) 19.17 cm^2 22 1542   Post-Procedure Volume (cm^3) 36.423 cm^3 22 1542   Undermining Starts ___ O'Clock 3 22 1503   Undermining Ends___ O'Clock 6 22 1503   Undermining Maxium Distance (cm) 1.2 @5 22 1503   Wound Assessment Fibrin;Pink/red 22 1503   Drainage Amount Large 22 1503   Drainage Description Serosanguinous 22 1503   Odor None 22 1503   Carmen-wound Assessment Blanchable erythema; Maceration 22 1503   Number of days: 7        Other physical exam findings:        Assessment:     Patient Active Problem List   Diagnosis Code    Neurologic gait dysfunction R26.9    Diabetes mellitus ([facility] [address] E11.9    Hypertension I10    Arthritis M19.90    Knee problem M25.9    Anemia due to stage 3 chronic kidney disease N18.30, D63.1    Primary osteoarthritis of both knees M17.0    Moderate protein-calorie malnutrition (HCC) E44.0    Pressure injury of sacral region, stage 4 (HCC) L89.154    Pressure injury of right hip, stage 3 (HCC) L89.213    Left bundle branch block I44.7    Pure hypercholesterolemia E78.00    Moderate obesity E66.8    Failure to thrive in adult R62.7    Pressure injury of sacral region, stage 3 (HCC) L89.153    Decreased dorsalis pedis pulse R09.89    ESRD on hemodialysis (HCC) N18.6, Z99.2       Overall Wound Assessment  Wound has improved. Size has decreased. Appearance has improved. Stage 3     (Please refer to nursing measurements and assessment regarding wound measurements.) Wound check - Care provided includes removal of existing dressing and visual inspection. Plan:       1. 100%Debridement today. See Procedure Note below. 2. Discussed appropriate home care of this wound. Dispensed dressing supplies and instructions on their use. Wound redressed. 3. Patient instructions were given. Dressing: alginate Offloading. 4. Follow up: 2 week. Clayton Means. Main Procedure Note    Marisela SANTOS Maynor  AGE: 67 y.o. GENDER: female  : 1950    TODAY'S DATE:  2022    The patient was identified and the procedure was confirmed. Lidocaine gel soaked gauze was applied at beginning of wound evaluation. The sacral wound was excisionally debrided sharply of fibrotic, necrotic, and hyperkeratotic tissue, including a layer of surrounding healthy tissue using curette for a total surface area of 20 cm2. The wound(s) was debrided down through and including subcutaneous tissue. 100% of the wound was debrided.   There was minimal bleeding that was controlled with pressure. Patient tolerated procedure well and was given proper instruction.       Stephanie Scanlon MD

## 2022-11-02 NOTE — DISCHARGE INSTRUCTIONS
Visit Discharge/Physician Orders     Discharge condition: Stable     Assessment of pain at discharge:minimal     Anesthetic used: 4% lidocaine     Discharge to: Home     Left via:Private automobile     Accompanied by: accompanied by         ECF/HHA: consult home care     Dressing Orders: sacrum ulcer: cleanse with normal saline, pack with plain alginate and cover with dry dressing daily. Treatment Orders:  Keep pressure off wound  Turn and reposition every 2 hours      Eat foods high in protein and vitamin c     Multivitamin daily  Ordered hospital bed through [facility] followup visit _______1 week__________________________  (Please note your next appointment above and if you are unable to keep, kindly give a 24 hour notice. Thank you.)     Physician signature:__________________________        If you experience any of the following, please call the [address] [place] [address] [address] during business hours:     * Increase in Pain  * Temperature over 101  * Increase in drainage from your wound  * Drainage with a foul odor  * Bleeding  * Increase in swelling  * Need for compression bandage changes due to slippage, breakthrough drainage. If you need medical attention outside of the business hours of the [facility] [address] please contact your PCP or go to the nearest emergency room.

## 2022-11-03 ENCOUNTER — HOSPITAL ENCOUNTER (INPATIENT)
Age: 72
LOS: 6 days | Discharge: NURSING, REHAB OR LONG-TERM CARE | DRG: 308 | End: 2022-11-10
Attending: STUDENT IN AN ORGANIZED HEALTH CARE EDUCATION/TRAINING PROGRAM | Admitting: FAMILY MEDICINE
Payer: MEDICAID

## 2022-11-03 ENCOUNTER — APPOINTMENT (OUTPATIENT)
Dept: CT IMAGING | Age: 72
DRG: 308 | End: 2022-11-03
Payer: MEDICAID

## 2022-11-03 ENCOUNTER — APPOINTMENT (OUTPATIENT)
Dept: GENERAL RADIOLOGY | Age: 72
DRG: 308 | End: 2022-11-03
Payer: MEDICAID
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
## 2022-11-03 DIAGNOSIS — S52.502A CLOSED FRACTURE OF DISTAL ENDS OF LEFT RADIUS AND ULNA, INITIAL ENCOUNTER: ICD-10-CM

## 2022-11-03 DIAGNOSIS — S72.431A CLOSED BICONDYLAR FRACTURE OF RIGHT FEMUR, INITIAL ENCOUNTER (HCC): Primary | ICD-10-CM

## 2022-11-03 DIAGNOSIS — S52.602A CLOSED FRACTURE OF DISTAL ENDS OF LEFT RADIUS AND ULNA, INITIAL ENCOUNTER: ICD-10-CM

## 2022-11-03 DIAGNOSIS — S72.421A CLOSED BICONDYLAR FRACTURE OF RIGHT FEMUR, INITIAL ENCOUNTER (HCC): Primary | ICD-10-CM

## 2022-11-03 LAB
ABO/RH: NORMAL
ALBUMIN SERPL-MCNC: 3.6 G/DL (ref 3.5–5.2)
ALP BLD-CCNC: 100 U/L (ref 35–104)
ALT SERPL-CCNC: 17 U/L (ref 0–32)
ANION GAP SERPL CALCULATED.3IONS-SCNC: 15 MMOL/L (ref 7–16)
ANTIBODY SCREEN: NORMAL
AST SERPL-CCNC: 23 U/L (ref 0–31)
BASOPHILS ABSOLUTE: 0.03 E9/L (ref 0–0.2)
BASOPHILS RELATIVE PERCENT: 0.2 % (ref 0–2)
BILIRUB SERPL-MCNC: 0.3 MG/DL (ref 0–1.2)
BUN BLDV-MCNC: 34 MG/DL (ref 6–23)
CALCIUM SERPL-MCNC: 9.7 MG/DL (ref 8.6–10.2)
CHLORIDE BLD-SCNC: 97 MMOL/L (ref 98–107)
CO2: 27 MMOL/L (ref 22–29)
CREAT SERPL-MCNC: 5.4 MG/DL (ref 0.5–1)
EOSINOPHILS ABSOLUTE: 0.02 E9/L (ref 0.05–0.5)
EOSINOPHILS RELATIVE PERCENT: 0.2 % (ref 0–6)
GFR SERPL CREATININE-BSD FRML MDRD: 8 ML/MIN/1.73
GLUCOSE BLD-MCNC: 242 MG/DL (ref 74–99)
HCT VFR BLD CALC: 35.3 % (ref 34–48)
HEMOGLOBIN: 11.1 G/DL (ref 11.5–15.5)
IMMATURE GRANULOCYTES #: 0.06 E9/L
IMMATURE GRANULOCYTES %: 0.5 % (ref 0–5)
LYMPHOCYTES ABSOLUTE: 0.63 E9/L (ref 1.5–4)
LYMPHOCYTES RELATIVE PERCENT: 4.8 % (ref 20–42)
MCH RBC QN AUTO: 34.8 PG (ref 26–35)
MCHC RBC AUTO-ENTMCNC: 31.4 % (ref 32–34.5)
MCV RBC AUTO: 110.7 FL (ref 80–99.9)
MONOCYTES ABSOLUTE: 0.97 E9/L (ref 0.1–0.95)
MONOCYTES RELATIVE PERCENT: 7.5 % (ref 2–12)
NEUTROPHILS ABSOLUTE: 11.28 E9/L (ref 1.8–7.3)
NEUTROPHILS RELATIVE PERCENT: 86.8 % (ref 43–80)
PDW BLD-RTO: 15 FL (ref 11.5–15)
PLATELET # BLD: 200 E9/L (ref 130–450)
PMV BLD AUTO: 10.9 FL (ref 7–12)
POTASSIUM SERPL-SCNC: 4.7 MMOL/L (ref 3.5–5)
RBC # BLD: 3.19 E12/L (ref 3.5–5.5)
RBC # BLD: NORMAL 10*6/UL
SODIUM BLD-SCNC: 139 MMOL/L (ref 132–146)
TOTAL PROTEIN: 6.4 G/DL (ref 6.4–8.3)
WBC # BLD: 13 E9/L (ref 4.5–11.5)

## 2022-11-03 PROCEDURE — 96374 THER/PROPH/DIAG INJ IV PUSH: CPT

## 2022-11-03 PROCEDURE — 72125 CT NECK SPINE W/O DYE: CPT

## 2022-11-03 PROCEDURE — 96376 TX/PRO/DX INJ SAME DRUG ADON: CPT

## 2022-11-03 PROCEDURE — 86901 BLOOD TYPING SEROLOGIC RH(D): CPT

## 2022-11-03 PROCEDURE — 73130 X-RAY EXAM OF HAND: CPT

## 2022-11-03 PROCEDURE — 64450 NJX AA&/STRD OTHER PN/BRANCH: CPT

## 2022-11-03 PROCEDURE — 6360000002 HC RX W HCPCS: Performed by: STUDENT IN AN ORGANIZED HEALTH CARE EDUCATION/TRAINING PROGRAM

## 2022-11-03 PROCEDURE — 2500000003 HC RX 250 WO HCPCS

## 2022-11-03 PROCEDURE — 99285 EMERGENCY DEPT VISIT HI MDM: CPT

## 2022-11-03 PROCEDURE — 6360000002 HC RX W HCPCS

## 2022-11-03 PROCEDURE — 96372 THER/PROPH/DIAG INJ SC/IM: CPT

## 2022-11-03 PROCEDURE — 73070 X-RAY EXAM OF ELBOW: CPT

## 2022-11-03 PROCEDURE — 73560 X-RAY EXAM OF KNEE 1 OR 2: CPT

## 2022-11-03 PROCEDURE — 85025 COMPLETE CBC W/AUTO DIFF WBC: CPT

## 2022-11-03 PROCEDURE — 73030 X-RAY EXAM OF SHOULDER: CPT

## 2022-11-03 PROCEDURE — 70450 CT HEAD/BRAIN W/O DYE: CPT

## 2022-11-03 PROCEDURE — 86850 RBC ANTIBODY SCREEN: CPT

## 2022-11-03 PROCEDURE — 86900 BLOOD TYPING SEROLOGIC ABO: CPT

## 2022-11-03 PROCEDURE — 80053 COMPREHEN METABOLIC PANEL: CPT

## 2022-11-03 RX ORDER — FENTANYL CITRATE 50 UG/ML
25 INJECTION, SOLUTION INTRAMUSCULAR; INTRAVENOUS ONCE
Status: COMPLETED | OUTPATIENT
Start: 2022-11-03 | End: 2022-11-03

## 2022-11-03 RX ORDER — FENTANYL CITRATE 50 UG/ML
50 INJECTION, SOLUTION INTRAMUSCULAR; INTRAVENOUS ONCE
Status: DISCONTINUED | OUTPATIENT
Start: 2022-11-03 | End: 2022-11-03

## 2022-11-03 RX ORDER — LIDOCAINE HYDROCHLORIDE 10 MG/ML
20 INJECTION, SOLUTION INFILTRATION; PERINEURAL ONCE
Status: COMPLETED | OUTPATIENT
Start: 2022-11-03 | End: 2022-11-03

## 2022-11-03 RX ADMIN — FENTANYL CITRATE 25 MCG: 0.05 INJECTION, SOLUTION INTRAMUSCULAR; INTRAVENOUS at 20:48

## 2022-11-03 RX ADMIN — LIDOCAINE HYDROCHLORIDE 20 ML: 10 INJECTION, SOLUTION INFILTRATION; PERINEURAL at 23:41

## 2022-11-03 RX ADMIN — HYDROMORPHONE HYDROCHLORIDE 1 MG: 1 INJECTION, SOLUTION INTRAMUSCULAR; INTRAVENOUS; SUBCUTANEOUS at 23:42

## 2022-11-03 ASSESSMENT — PAIN DESCRIPTION - DESCRIPTORS
DESCRIPTORS: ACHING
DESCRIPTORS: DISCOMFORT;SHARP

## 2022-11-03 ASSESSMENT — PAIN DESCRIPTION - LOCATION
LOCATION: WRIST;LEG
LOCATION: KNEE;WRIST

## 2022-11-03 ASSESSMENT — PAIN SCALES - GENERAL
PAINLEVEL_OUTOF10: 10
PAINLEVEL_OUTOF10: 10

## 2022-11-03 ASSESSMENT — PAIN DESCRIPTION - ORIENTATION: ORIENTATION: LEFT;RIGHT

## 2022-11-03 NOTE — LETTER
[place] Department Medicaid  CERTIFICATION OF NECESSITY  FOR NON-EMERGENCY TRANSPORTATION   BY GROUND AMBULANCE      Individual Information   1. Name: Kenneth Berrios 2. [place] Medicaid Billing Number:    3. Address: [address] [place] [address]      Transportation Provider Information   4. Provider Name:    5. [place] Provider Number:  National Provider Identifier (NPI):      Certification  7. Criteria:  During transport, this individual requires:  [x] Medical treatment or continuous     supervision by an EMT. [] The administration or regulation of oxygen by another person. [] Supervised protective restraint. 8. Period Beginning Date:    5. Length  [] Not more than {#:58345} day(s)  [] One Year     Additional Information Relevant to Certification   10. Comments or Explanations, If Necessary or Appropriate   Closed bicondylar fracture of distal femur, right, DECONDITIONED D/T EXTENDED HOSPITAL STAY IMPAIRING FUNCTIONAL MOBILITY, PERIODS OF CONFUSION. Certifying Practitioner Information   11. Name of Practitioner:    12. [place] Medicaid Provider Number, If Applicable:  [address] Provider Identifier (NPI):      Signature Information   14. Date of Signature: 11/8/2022   15. Name of Person Signing: Electronically signed by Mushtaq Matamoros RN on 11/8/2022 at 12:31 PM     16.  Signature and Professional Designation: .Electronically signed by Mushtaq Matamoros RN on 11/8/2022 at 12:31 PM  Discharge [address] [place] [id number]  Rev. 7/2015

## 2022-11-04 ENCOUNTER — APPOINTMENT (OUTPATIENT)
Dept: CT IMAGING | Age: 72
DRG: 308 | End: 2022-11-04
Payer: MEDICAID

## 2022-11-04 ENCOUNTER — ANESTHESIA (OUTPATIENT)
Dept: OPERATING ROOM | Age: 72
DRG: 308 | End: 2022-11-04
Payer: MEDICAID

## 2022-11-04 ENCOUNTER — APPOINTMENT (OUTPATIENT)
Dept: GENERAL RADIOLOGY | Age: 72
DRG: 308 | End: 2022-11-04
Payer: MEDICAID

## 2022-11-04 ENCOUNTER — ANESTHESIA EVENT (OUTPATIENT)
Dept: OPERATING ROOM | Age: 72
DRG: 308 | End: 2022-11-04
Payer: MEDICAID

## 2022-11-04 PROBLEM — S72.8X1A OTHER FRACTURE OF RIGHT FEMUR, INITIAL ENCOUNTER FOR CLOSED FRACTURE (HCC): Status: ACTIVE | Noted: 2022-11-04

## 2022-11-04 PROBLEM — S52.602A CLOSED FRACTURE OF DISTAL ENDS OF LEFT RADIUS AND ULNA: Status: ACTIVE | Noted: 2022-11-04

## 2022-11-04 PROBLEM — S72.421A CLOSED BICONDYLAR FRACTURE OF DISTAL END OF RIGHT FEMUR (HCC): Status: ACTIVE | Noted: 2022-11-04

## 2022-11-04 PROBLEM — S72.431A CLOSED BICONDYLAR FRACTURE OF DISTAL FEMUR, RIGHT, INITIAL ENCOUNTER (HCC): Status: ACTIVE | Noted: 2022-11-04

## 2022-11-04 PROBLEM — S72.421A CLOSED BICONDYLAR FRACTURE OF DISTAL FEMUR, RIGHT, INITIAL ENCOUNTER (HCC): Status: ACTIVE | Noted: 2022-11-04

## 2022-11-04 PROBLEM — S52.502A CLOSED FRACTURE OF DISTAL ENDS OF LEFT RADIUS AND ULNA: Status: ACTIVE | Noted: 2022-11-04

## 2022-11-04 PROBLEM — S72.431A CLOSED BICONDYLAR FRACTURE OF DISTAL END OF RIGHT FEMUR (HCC): Status: ACTIVE | Noted: 2022-11-04

## 2022-11-04 LAB
ANION GAP SERPL CALCULATED.3IONS-SCNC: 15 MMOL/L (ref 7–16)
BUN BLDV-MCNC: 37 MG/DL (ref 6–23)
CALCIUM SERPL-MCNC: 9.6 MG/DL (ref 8.6–10.2)
CHLORIDE BLD-SCNC: 100 MMOL/L (ref 98–107)
CO2: 26 MMOL/L (ref 22–29)
CREAT SERPL-MCNC: 6.2 MG/DL (ref 0.5–1)
GFR SERPL CREATININE-BSD FRML MDRD: 7 ML/MIN/1.73
GLUCOSE BLD-MCNC: 177 MG/DL (ref 74–99)
HCT VFR BLD CALC: 33.4 % (ref 34–48)
HEMOGLOBIN: 10.5 G/DL (ref 11.5–15.5)
MCH RBC QN AUTO: 34.9 PG (ref 26–35)
MCHC RBC AUTO-ENTMCNC: 31.4 % (ref 32–34.5)
MCV RBC AUTO: 111 FL (ref 80–99.9)
METER GLUCOSE: 161 MG/DL (ref 74–99)
METER GLUCOSE: 264 MG/DL (ref 74–99)
PDW BLD-RTO: 15.4 FL (ref 11.5–15)
PLATELET # BLD: 205 E9/L (ref 130–450)
PMV BLD AUTO: 10.7 FL (ref 7–12)
POTASSIUM SERPL-SCNC: 5.4 MMOL/L (ref 3.5–5)
RBC # BLD: 3.01 E12/L (ref 3.5–5.5)
SODIUM BLD-SCNC: 141 MMOL/L (ref 132–146)
WBC # BLD: 9.1 E9/L (ref 4.5–11.5)

## 2022-11-04 PROCEDURE — 3700000001 HC ADD 15 MINUTES (ANESTHESIA): Performed by: ORTHOPAEDIC SURGERY

## 2022-11-04 PROCEDURE — 6360000002 HC RX W HCPCS: Performed by: PHYSICIAN ASSISTANT

## 2022-11-04 PROCEDURE — 5A1D70Z PERFORMANCE OF URINARY FILTRATION, INTERMITTENT, LESS THAN 6 HOURS PER DAY: ICD-10-PCS | Performed by: INTERNAL MEDICINE

## 2022-11-04 PROCEDURE — 2580000003 HC RX 258: Performed by: ORTHOPAEDIC SURGERY

## 2022-11-04 PROCEDURE — 2709999900 HC NON-CHARGEABLE SUPPLY: Performed by: ORTHOPAEDIC SURGERY

## 2022-11-04 PROCEDURE — 3209999900 FLUORO FOR SURGICAL PROCEDURES

## 2022-11-04 PROCEDURE — 2720000010 HC SURG SUPPLY STERILE: Performed by: ORTHOPAEDIC SURGERY

## 2022-11-04 PROCEDURE — 6360000002 HC RX W HCPCS

## 2022-11-04 PROCEDURE — 3600000013 HC SURGERY LEVEL 3 ADDTL 15MIN: Performed by: ORTHOPAEDIC SURGERY

## 2022-11-04 PROCEDURE — 96376 TX/PRO/DX INJ SAME DRUG ADON: CPT

## 2022-11-04 PROCEDURE — 25607 OPTX DST RD XARTC FX/EPI SEP: CPT | Performed by: PHYSICIAN ASSISTANT

## 2022-11-04 PROCEDURE — C1713 ANCHOR/SCREW BN/BN,TIS/BN: HCPCS | Performed by: ORTHOPAEDIC SURGERY

## 2022-11-04 PROCEDURE — 85027 COMPLETE CBC AUTOMATED: CPT

## 2022-11-04 PROCEDURE — 2500000003 HC RX 250 WO HCPCS: Performed by: NURSE ANESTHETIST, CERTIFIED REGISTERED

## 2022-11-04 PROCEDURE — 90935 HEMODIALYSIS ONE EVALUATION: CPT

## 2022-11-04 PROCEDURE — 80048 BASIC METABOLIC PNL TOTAL CA: CPT

## 2022-11-04 PROCEDURE — 73560 X-RAY EXAM OF KNEE 1 OR 2: CPT

## 2022-11-04 PROCEDURE — 6360000002 HC RX W HCPCS: Performed by: ORTHOPAEDIC SURGERY

## 2022-11-04 PROCEDURE — 27514 TREATMENT OF THIGH FRACTURE: CPT | Performed by: ORTHOPAEDIC SURGERY

## 2022-11-04 PROCEDURE — 7100000000 HC PACU RECOVERY - FIRST 15 MIN: Performed by: ORTHOPAEDIC SURGERY

## 2022-11-04 PROCEDURE — 73552 X-RAY EXAM OF FEMUR 2/>: CPT

## 2022-11-04 PROCEDURE — 99223 1ST HOSP IP/OBS HIGH 75: CPT | Performed by: ORTHOPAEDIC SURGERY

## 2022-11-04 PROCEDURE — 6360000002 HC RX W HCPCS: Performed by: NURSE ANESTHETIST, CERTIFIED REGISTERED

## 2022-11-04 PROCEDURE — 2580000003 HC RX 258: Performed by: PHYSICIAN ASSISTANT

## 2022-11-04 PROCEDURE — 36415 COLL VENOUS BLD VENIPUNCTURE: CPT

## 2022-11-04 PROCEDURE — 6360000002 HC RX W HCPCS: Performed by: FAMILY MEDICINE

## 2022-11-04 PROCEDURE — 3700000000 HC ANESTHESIA ATTENDED CARE: Performed by: ORTHOPAEDIC SURGERY

## 2022-11-04 PROCEDURE — C1769 GUIDE WIRE: HCPCS | Performed by: ORTHOPAEDIC SURGERY

## 2022-11-04 PROCEDURE — 1200000000 HC SEMI PRIVATE

## 2022-11-04 PROCEDURE — 25607 OPTX DST RD XARTC FX/EPI SEP: CPT | Performed by: ORTHOPAEDIC SURGERY

## 2022-11-04 PROCEDURE — 73700 CT LOWER EXTREMITY W/O DYE: CPT

## 2022-11-04 PROCEDURE — 7100000001 HC PACU RECOVERY - ADDTL 15 MIN: Performed by: ORTHOPAEDIC SURGERY

## 2022-11-04 PROCEDURE — 27514 TREATMENT OF THIGH FRACTURE: CPT | Performed by: PHYSICIAN ASSISTANT

## 2022-11-04 PROCEDURE — 2500000003 HC RX 250 WO HCPCS: Performed by: ORTHOPAEDIC SURGERY

## 2022-11-04 PROCEDURE — 82962 GLUCOSE BLOOD TEST: CPT

## 2022-11-04 PROCEDURE — 73110 X-RAY EXAM OF WRIST: CPT

## 2022-11-04 PROCEDURE — 73552 X-RAY EXAM OF FEMUR 2/>: CPT | Performed by: RADIOLOGY

## 2022-11-04 PROCEDURE — 3600000003 HC SURGERY LEVEL 3 BASE: Performed by: ORTHOPAEDIC SURGERY

## 2022-11-04 PROCEDURE — 6370000000 HC RX 637 (ALT 250 FOR IP): Performed by: PHYSICIAN ASSISTANT

## 2022-11-04 PROCEDURE — 2580000003 HC RX 258: Performed by: NURSE ANESTHETIST, CERTIFIED REGISTERED

## 2022-11-04 DEVICE — PLATE BNE W22XL54MM STD 9 H L DST VOLAR RAD S STL TWO CLMN: Type: IMPLANTABLE DEVICE | Site: WRIST | Status: FUNCTIONAL

## 2022-11-04 DEVICE — SCREW VA LCK SCREOPTILINK 5.0X55MM: Type: IMPLANTABLE DEVICE | Site: FEMUR | Status: FUNCTIONAL

## 2022-11-04 DEVICE — SCREW BNE L12MM DIA2.7MM CORT S STL ST T8 STARDRV RECESS: Type: IMPLANTABLE DEVICE | Site: WRIST | Status: FUNCTIONAL

## 2022-11-04 DEVICE — RFNA / 12MM / 360MM STANDARD BEND / STERILE: Type: IMPLANTABLE DEVICE | Site: FEMUR | Status: FUNCTIONAL

## 2022-11-04 DEVICE — SCREW BNE L65MM DIA3.5MM PROX TIB S STL ST FULL THRD T15: Type: IMPLANTABLE DEVICE | Site: FEMUR | Status: FUNCTIONAL

## 2022-11-04 DEVICE — K WIRE FIX L150MM DIA1.6MM S STL TRCR PNT: Type: IMPLANTABLE DEVICE | Site: WRIST | Status: FUNCTIONAL

## 2022-11-04 DEVICE — SCREW BNE L18MM DIA2.4MM DST RAD VOLAR S STL ST VAR ANG LOK: Type: IMPLANTABLE DEVICE | Site: WRIST | Status: FUNCTIONAL

## 2022-11-04 DEVICE — SCREW BNE L75MM DIA3.5MM PROX TIB S STL ST FULL THRD T15: Type: IMPLANTABLE DEVICE | Site: FEMUR | Status: FUNCTIONAL

## 2022-11-04 DEVICE — WASHER LKNG STTSCH RFNA 5 DEG RT ST: Type: IMPLANTABLE DEVICE | Site: FEMUR | Status: FUNCTIONAL

## 2022-11-04 DEVICE — LOCKING SCREW FOR IM NAIL Ø 5MM/ 72MM/ XL25/ STERILE: Type: IMPLANTABLE DEVICE | Site: FEMUR | Status: FUNCTIONAL

## 2022-11-04 DEVICE — LOCKING SCREW FOR IM NAIL Ø 5MM/ 38MM/ XL25/ STERILE: Type: IMPLANTABLE DEVICE | Site: FEMUR | Status: FUNCTIONAL

## 2022-11-04 DEVICE — SCREW BNE L16MM DIA2.4MM DST RAD VOLAR S STL ST VAR ANG LOK: Type: IMPLANTABLE DEVICE | Site: WRIST | Status: FUNCTIONAL

## 2022-11-04 DEVICE — SCREW OPTILINK VA LCKING SLF -TP T25 SD 5.0X70MM: Type: IMPLANTABLE DEVICE | Site: FEMUR | Status: FUNCTIONAL

## 2022-11-04 DEVICE — LOCKING SCREW FOR IM NAIL Ø 5MM/ 70MM/ XL25/ STERILE: Type: IMPLANTABLE DEVICE | Site: FEMUR | Status: FUNCTIONAL

## 2022-11-04 DEVICE — SCREW BNE L12MM DIA24MM CORT S STL ST T8 STARDRV RECESS: Type: IMPLANTABLE DEVICE | Site: WRIST | Status: FUNCTIONAL

## 2022-11-04 DEVICE — K WIRE FIX L150MM DIA1.25MM S STL TRCR PNT: Type: IMPLANTABLE DEVICE | Site: WRIST | Status: FUNCTIONAL

## 2022-11-04 RX ORDER — GABAPENTIN 100 MG/1
100 CAPSULE ORAL 3 TIMES DAILY
Status: DISCONTINUED | OUTPATIENT
Start: 2022-11-04 | End: 2022-11-10 | Stop reason: HOSPADM

## 2022-11-04 RX ORDER — INSULIN LISPRO 100 [IU]/ML
0-4 INJECTION, SOLUTION INTRAVENOUS; SUBCUTANEOUS NIGHTLY
Status: DISCONTINUED | OUTPATIENT
Start: 2022-11-04 | End: 2022-11-10 | Stop reason: HOSPADM

## 2022-11-04 RX ORDER — SODIUM CHLORIDE 0.9 % (FLUSH) 0.9 %
5-40 SYRINGE (ML) INJECTION EVERY 12 HOURS SCHEDULED
Status: DISCONTINUED | OUTPATIENT
Start: 2022-11-04 | End: 2022-11-04 | Stop reason: HOSPADM

## 2022-11-04 RX ORDER — MORPHINE SULFATE 2 MG/ML
2 INJECTION, SOLUTION INTRAMUSCULAR; INTRAVENOUS EVERY 5 MIN PRN
Status: DISCONTINUED | OUTPATIENT
Start: 2022-11-04 | End: 2022-11-04 | Stop reason: HOSPADM

## 2022-11-04 RX ORDER — PROPOFOL 10 MG/ML
INJECTION, EMULSION INTRAVENOUS PRN
Status: DISCONTINUED | OUTPATIENT
Start: 2022-11-04 | End: 2022-11-04 | Stop reason: SDUPTHER

## 2022-11-04 RX ORDER — MIDAZOLAM HYDROCHLORIDE 1 MG/ML
INJECTION INTRAMUSCULAR; INTRAVENOUS
Status: COMPLETED
Start: 2022-11-04 | End: 2022-11-04

## 2022-11-04 RX ORDER — SODIUM CHLORIDE 0.9 % (FLUSH) 0.9 %
5-40 SYRINGE (ML) INJECTION PRN
Status: DISCONTINUED | OUTPATIENT
Start: 2022-11-04 | End: 2022-11-04 | Stop reason: HOSPADM

## 2022-11-04 RX ORDER — INSULIN LISPRO 100 [IU]/ML
0-8 INJECTION, SOLUTION INTRAVENOUS; SUBCUTANEOUS
Status: DISCONTINUED | OUTPATIENT
Start: 2022-11-04 | End: 2022-11-10 | Stop reason: HOSPADM

## 2022-11-04 RX ORDER — ACETAMINOPHEN 325 MG/1
650 TABLET ORAL EVERY 6 HOURS PRN
Status: DISCONTINUED | OUTPATIENT
Start: 2022-11-04 | End: 2022-11-10 | Stop reason: HOSPADM

## 2022-11-04 RX ORDER — DIMETHICONE, OXYBENZONE, AND PADIMATE O 2; 2.5; 6.6 G/100G; G/100G; G/100G
STICK TOPICAL
Status: DISPENSED
Start: 2022-11-04 | End: 2022-11-05

## 2022-11-04 RX ORDER — SODIUM CHLORIDE 9 MG/ML
INJECTION, SOLUTION INTRAVENOUS PRN
Status: DISCONTINUED | OUTPATIENT
Start: 2022-11-04 | End: 2022-11-04 | Stop reason: HOSPADM

## 2022-11-04 RX ORDER — HEPARIN SODIUM 10000 [USP'U]/ML
5000 INJECTION, SOLUTION INTRAVENOUS; SUBCUTANEOUS EVERY 8 HOURS SCHEDULED
Status: DISCONTINUED | OUTPATIENT
Start: 2022-11-04 | End: 2022-11-10 | Stop reason: HOSPADM

## 2022-11-04 RX ORDER — ONDANSETRON 2 MG/ML
INJECTION INTRAMUSCULAR; INTRAVENOUS PRN
Status: DISCONTINUED | OUTPATIENT
Start: 2022-11-04 | End: 2022-11-04 | Stop reason: SDUPTHER

## 2022-11-04 RX ORDER — BUPIVACAINE HYDROCHLORIDE AND EPINEPHRINE 5; 5 MG/ML; UG/ML
INJECTION, SOLUTION EPIDURAL; INTRACAUDAL; PERINEURAL PRN
Status: DISCONTINUED | OUTPATIENT
Start: 2022-11-04 | End: 2022-11-04 | Stop reason: ALTCHOICE

## 2022-11-04 RX ORDER — ONDANSETRON 4 MG/1
4 TABLET, ORALLY DISINTEGRATING ORAL EVERY 8 HOURS PRN
Status: DISCONTINUED | OUTPATIENT
Start: 2022-11-04 | End: 2022-11-10 | Stop reason: HOSPADM

## 2022-11-04 RX ORDER — ACETAMINOPHEN 650 MG/1
650 SUPPOSITORY RECTAL EVERY 6 HOURS PRN
Status: DISCONTINUED | OUTPATIENT
Start: 2022-11-04 | End: 2022-11-10 | Stop reason: HOSPADM

## 2022-11-04 RX ORDER — LIDOCAINE HYDROCHLORIDE 20 MG/ML
INJECTION, SOLUTION EPIDURAL; INFILTRATION; INTRACAUDAL; PERINEURAL PRN
Status: DISCONTINUED | OUTPATIENT
Start: 2022-11-04 | End: 2022-11-04 | Stop reason: SDUPTHER

## 2022-11-04 RX ORDER — TRAZODONE HYDROCHLORIDE 50 MG/1
25 TABLET ORAL NIGHTLY PRN
Status: DISCONTINUED | OUTPATIENT
Start: 2022-11-04 | End: 2022-11-10 | Stop reason: HOSPADM

## 2022-11-04 RX ORDER — MIDAZOLAM HYDROCHLORIDE 2 MG/2ML
2 INJECTION, SOLUTION INTRAMUSCULAR; INTRAVENOUS ONCE
Status: COMPLETED | OUTPATIENT
Start: 2022-11-04 | End: 2022-11-04

## 2022-11-04 RX ORDER — POLYETHYLENE GLYCOL 3350 17 G/17G
17 POWDER, FOR SOLUTION ORAL DAILY PRN
Status: DISCONTINUED | OUTPATIENT
Start: 2022-11-04 | End: 2022-11-10 | Stop reason: HOSPADM

## 2022-11-04 RX ORDER — BUSPIRONE HYDROCHLORIDE 5 MG/1
5 TABLET ORAL NIGHTLY
Status: DISCONTINUED | OUTPATIENT
Start: 2022-11-04 | End: 2022-11-04 | Stop reason: SDUPTHER

## 2022-11-04 RX ORDER — MORPHINE SULFATE 2 MG/ML
1 INJECTION, SOLUTION INTRAMUSCULAR; INTRAVENOUS EVERY 5 MIN PRN
Status: DISCONTINUED | OUTPATIENT
Start: 2022-11-04 | End: 2022-11-04 | Stop reason: HOSPADM

## 2022-11-04 RX ORDER — SODIUM CHLORIDE 0.9 % (FLUSH) 0.9 %
5-40 SYRINGE (ML) INJECTION PRN
Status: DISCONTINUED | OUTPATIENT
Start: 2022-11-04 | End: 2022-11-10 | Stop reason: HOSPADM

## 2022-11-04 RX ORDER — ROCURONIUM BROMIDE 10 MG/ML
INJECTION, SOLUTION INTRAVENOUS PRN
Status: DISCONTINUED | OUTPATIENT
Start: 2022-11-04 | End: 2022-11-04 | Stop reason: SDUPTHER

## 2022-11-04 RX ORDER — PROCHLORPERAZINE MALEATE 5 MG/1
5 TABLET ORAL EVERY 6 HOURS PRN
Status: DISCONTINUED | OUTPATIENT
Start: 2022-11-04 | End: 2022-11-10 | Stop reason: HOSPADM

## 2022-11-04 RX ORDER — SODIUM CHLORIDE 9 MG/ML
INJECTION, SOLUTION INTRAVENOUS CONTINUOUS PRN
Status: DISCONTINUED | OUTPATIENT
Start: 2022-11-04 | End: 2022-11-04 | Stop reason: SDUPTHER

## 2022-11-04 RX ORDER — CHOLECALCIFEROL (VITAMIN D3) 50 MCG
2000 TABLET ORAL DAILY
Status: DISCONTINUED | OUTPATIENT
Start: 2022-11-04 | End: 2022-11-10 | Stop reason: HOSPADM

## 2022-11-04 RX ORDER — OXYCODONE HYDROCHLORIDE AND ACETAMINOPHEN 5; 325 MG/1; MG/1
1 TABLET ORAL EVERY 6 HOURS PRN
Qty: 28 TABLET | Refills: 0 | Status: SHIPPED | OUTPATIENT
Start: 2022-11-04 | End: 2022-11-11

## 2022-11-04 RX ORDER — PHENYLEPHRINE HCL IN 0.9% NACL 1 MG/10 ML
SYRINGE (ML) INTRAVENOUS PRN
Status: DISCONTINUED | OUTPATIENT
Start: 2022-11-04 | End: 2022-11-04 | Stop reason: SDUPTHER

## 2022-11-04 RX ORDER — DEXAMETHASONE SODIUM PHOSPHATE 4 MG/ML
INJECTION, SOLUTION INTRA-ARTICULAR; INTRALESIONAL; INTRAMUSCULAR; INTRAVENOUS; SOFT TISSUE PRN
Status: DISCONTINUED | OUTPATIENT
Start: 2022-11-04 | End: 2022-11-04 | Stop reason: SDUPTHER

## 2022-11-04 RX ORDER — OXYCODONE HYDROCHLORIDE AND ACETAMINOPHEN 5; 325 MG/1; MG/1
1 TABLET ORAL EVERY 4 HOURS PRN
Status: DISCONTINUED | OUTPATIENT
Start: 2022-11-04 | End: 2022-11-10 | Stop reason: HOSPADM

## 2022-11-04 RX ORDER — POLYETHYLENE GLYCOL 3350 17 G/17G
17 POWDER, FOR SOLUTION ORAL DAILY
Status: DISCONTINUED | OUTPATIENT
Start: 2022-11-04 | End: 2022-11-10 | Stop reason: HOSPADM

## 2022-11-04 RX ORDER — SEVELAMER CARBONATE 800 MG/1
800 TABLET, FILM COATED ORAL
Status: DISCONTINUED | OUTPATIENT
Start: 2022-11-04 | End: 2022-11-06

## 2022-11-04 RX ORDER — METOPROLOL SUCCINATE 25 MG/1
25 TABLET, EXTENDED RELEASE ORAL DAILY
Status: DISCONTINUED | OUTPATIENT
Start: 2022-11-04 | End: 2022-11-06

## 2022-11-04 RX ORDER — INSULIN GLARGINE 100 [IU]/ML
6 INJECTION, SOLUTION SUBCUTANEOUS NIGHTLY
Status: DISCONTINUED | OUTPATIENT
Start: 2022-11-04 | End: 2022-11-10 | Stop reason: HOSPADM

## 2022-11-04 RX ORDER — MEPERIDINE HYDROCHLORIDE 25 MG/ML
12.5 INJECTION INTRAMUSCULAR; INTRAVENOUS; SUBCUTANEOUS EVERY 5 MIN PRN
Status: DISCONTINUED | OUTPATIENT
Start: 2022-11-04 | End: 2022-11-04 | Stop reason: HOSPADM

## 2022-11-04 RX ORDER — FENTANYL CITRATE 50 UG/ML
INJECTION, SOLUTION INTRAMUSCULAR; INTRAVENOUS PRN
Status: DISCONTINUED | OUTPATIENT
Start: 2022-11-04 | End: 2022-11-04 | Stop reason: SDUPTHER

## 2022-11-04 RX ORDER — DEXTROSE MONOHYDRATE 100 MG/ML
INJECTION, SOLUTION INTRAVENOUS CONTINUOUS PRN
Status: DISCONTINUED | OUTPATIENT
Start: 2022-11-04 | End: 2022-11-10 | Stop reason: HOSPADM

## 2022-11-04 RX ORDER — OXYCODONE AND ACETAMINOPHEN 10; 325 MG/1; MG/1
1 TABLET ORAL EVERY 4 HOURS PRN
Status: DISCONTINUED | OUTPATIENT
Start: 2022-11-04 | End: 2022-11-04

## 2022-11-04 RX ORDER — HYDROMORPHONE HCL 110MG/55ML
PATIENT CONTROLLED ANALGESIA SYRINGE INTRAVENOUS PRN
Status: DISCONTINUED | OUTPATIENT
Start: 2022-11-04 | End: 2022-11-04 | Stop reason: SDUPTHER

## 2022-11-04 RX ORDER — SODIUM CHLORIDE 9 MG/ML
INJECTION, SOLUTION INTRAVENOUS PRN
Status: DISCONTINUED | OUTPATIENT
Start: 2022-11-04 | End: 2022-11-10 | Stop reason: HOSPADM

## 2022-11-04 RX ORDER — BUSPIRONE HYDROCHLORIDE 10 MG/1
10 TABLET ORAL 2 TIMES DAILY
Status: DISCONTINUED | OUTPATIENT
Start: 2022-11-04 | End: 2022-11-10 | Stop reason: HOSPADM

## 2022-11-04 RX ORDER — MIDODRINE HYDROCHLORIDE 5 MG/1
15 TABLET ORAL
Status: DISCONTINUED | OUTPATIENT
Start: 2022-11-04 | End: 2022-11-06

## 2022-11-04 RX ORDER — ESCITALOPRAM OXALATE 10 MG/1
10 TABLET ORAL DAILY
Status: DISCONTINUED | OUTPATIENT
Start: 2022-11-04 | End: 2022-11-10 | Stop reason: HOSPADM

## 2022-11-04 RX ORDER — GABAPENTIN 300 MG/1
300 CAPSULE ORAL 3 TIMES DAILY
Status: DISCONTINUED | OUTPATIENT
Start: 2022-11-04 | End: 2022-11-04 | Stop reason: DRUGHIGH

## 2022-11-04 RX ORDER — SODIUM CHLORIDE 0.9 % (FLUSH) 0.9 %
5-40 SYRINGE (ML) INJECTION EVERY 12 HOURS SCHEDULED
Status: DISCONTINUED | OUTPATIENT
Start: 2022-11-04 | End: 2022-11-10 | Stop reason: HOSPADM

## 2022-11-04 RX ORDER — ONDANSETRON 2 MG/ML
4 INJECTION INTRAMUSCULAR; INTRAVENOUS EVERY 6 HOURS PRN
Status: DISCONTINUED | OUTPATIENT
Start: 2022-11-04 | End: 2022-11-10 | Stop reason: HOSPADM

## 2022-11-04 RX ORDER — OXYCODONE HYDROCHLORIDE AND ACETAMINOPHEN 5; 325 MG/1; MG/1
2 TABLET ORAL EVERY 4 HOURS PRN
Status: DISCONTINUED | OUTPATIENT
Start: 2022-11-04 | End: 2022-11-10 | Stop reason: HOSPADM

## 2022-11-04 RX ADMIN — PROPOFOL 160 MG: 10 INJECTION, EMULSION INTRAVENOUS at 16:23

## 2022-11-04 RX ADMIN — MIDAZOLAM HYDROCHLORIDE 2 MG: 2 INJECTION, SOLUTION INTRAMUSCULAR; INTRAVENOUS at 14:44

## 2022-11-04 RX ADMIN — HYDROMORPHONE HYDROCHLORIDE 1 MG: 2 INJECTION, SOLUTION INTRAMUSCULAR; INTRAVENOUS; SUBCUTANEOUS at 18:42

## 2022-11-04 RX ADMIN — DEXAMETHASONE SODIUM PHOSPHATE 6 MG: 4 INJECTION, SOLUTION INTRAMUSCULAR; INTRAVENOUS at 16:28

## 2022-11-04 RX ADMIN — HYDROMORPHONE HYDROCHLORIDE 1 MG: 1 INJECTION, SOLUTION INTRAMUSCULAR; INTRAVENOUS; SUBCUTANEOUS at 01:13

## 2022-11-04 RX ADMIN — FENTANYL CITRATE 50 MCG: 50 INJECTION, SOLUTION INTRAMUSCULAR; INTRAVENOUS at 16:18

## 2022-11-04 RX ADMIN — HYDROMORPHONE HYDROCHLORIDE 1 MG: 2 INJECTION, SOLUTION INTRAMUSCULAR; INTRAVENOUS; SUBCUTANEOUS at 18:46

## 2022-11-04 RX ADMIN — Medication 150 MCG: at 18:05

## 2022-11-04 RX ADMIN — HYDROMORPHONE HYDROCHLORIDE 0.5 MG: 0.5 INJECTION, SOLUTION INTRAMUSCULAR; INTRAVENOUS; SUBCUTANEOUS at 23:22

## 2022-11-04 RX ADMIN — SODIUM CHLORIDE: 9 INJECTION, SOLUTION INTRAVENOUS at 16:09

## 2022-11-04 RX ADMIN — HYDROMORPHONE HYDROCHLORIDE 1 MG: 1 INJECTION, SOLUTION INTRAMUSCULAR; INTRAVENOUS; SUBCUTANEOUS at 09:39

## 2022-11-04 RX ADMIN — OXYCODONE AND ACETAMINOPHEN 2 TABLET: 5; 325 TABLET ORAL at 21:12

## 2022-11-04 RX ADMIN — Medication 10 ML: at 10:36

## 2022-11-04 RX ADMIN — ROCURONIUM BROMIDE 50 MG: 10 INJECTION, SOLUTION INTRAVENOUS at 16:24

## 2022-11-04 RX ADMIN — INSULIN GLARGINE 6 UNITS: 100 INJECTION, SOLUTION SUBCUTANEOUS at 21:12

## 2022-11-04 RX ADMIN — GABAPENTIN 100 MG: 100 CAPSULE ORAL at 21:06

## 2022-11-04 RX ADMIN — TRAZODONE HYDROCHLORIDE 25 MG: 50 TABLET ORAL at 21:06

## 2022-11-04 RX ADMIN — Medication 100 MCG: at 16:23

## 2022-11-04 RX ADMIN — FENTANYL CITRATE 50 MCG: 50 INJECTION, SOLUTION INTRAMUSCULAR; INTRAVENOUS at 17:05

## 2022-11-04 RX ADMIN — HYDROMORPHONE HYDROCHLORIDE 1 MG: 1 INJECTION, SOLUTION INTRAMUSCULAR; INTRAVENOUS; SUBCUTANEOUS at 05:19

## 2022-11-04 RX ADMIN — WATER 2000 MG: 1 INJECTION INTRAMUSCULAR; INTRAVENOUS; SUBCUTANEOUS at 16:28

## 2022-11-04 RX ADMIN — Medication 100 MCG: at 17:27

## 2022-11-04 RX ADMIN — Medication 200 MCG: at 16:31

## 2022-11-04 RX ADMIN — Medication 10 ML: at 21:06

## 2022-11-04 RX ADMIN — ONDANSETRON 4 MG: 2 INJECTION INTRAMUSCULAR; INTRAVENOUS at 18:19

## 2022-11-04 RX ADMIN — FENTANYL CITRATE 50 MCG: 50 INJECTION, SOLUTION INTRAMUSCULAR; INTRAVENOUS at 16:55

## 2022-11-04 RX ADMIN — FENTANYL CITRATE 50 MCG: 50 INJECTION, SOLUTION INTRAMUSCULAR; INTRAVENOUS at 16:22

## 2022-11-04 RX ADMIN — LIDOCAINE HYDROCHLORIDE 80 MG: 20 INJECTION, SOLUTION EPIDURAL; INFILTRATION; INTRACAUDAL; PERINEURAL at 16:23

## 2022-11-04 RX ADMIN — BUSPIRONE HYDROCHLORIDE 10 MG: 10 TABLET ORAL at 21:06

## 2022-11-04 ASSESSMENT — PAIN DESCRIPTION - PAIN TYPE
TYPE: ACUTE PAIN
TYPE: SURGICAL PAIN;ACUTE PAIN

## 2022-11-04 ASSESSMENT — PAIN SCALES - GENERAL
PAINLEVEL_OUTOF10: 8
PAINLEVEL_OUTOF10: 10
PAINLEVEL_OUTOF10: 7
PAINLEVEL_OUTOF10: 10
PAINLEVEL_OUTOF10: 10
PAINLEVEL_OUTOF10: 2
PAINLEVEL_OUTOF10: 6
PAINLEVEL_OUTOF10: 10
PAINLEVEL_OUTOF10: 4
PAINLEVEL_OUTOF10: 8
PAINLEVEL_OUTOF10: 5
PAINLEVEL_OUTOF10: 8
PAINLEVEL_OUTOF10: 8

## 2022-11-04 ASSESSMENT — PAIN DESCRIPTION - LOCATION
LOCATION: WRIST
LOCATION: WRIST;KNEE
LOCATION: KNEE
LOCATION: ARM;LEG;RIB CAGE
LOCATION: WRIST;KNEE
LOCATION: WRIST;KNEE
LOCATION: WRIST
LOCATION: WRIST

## 2022-11-04 ASSESSMENT — ENCOUNTER SYMPTOMS: SHORTNESS OF BREATH: 0

## 2022-11-04 ASSESSMENT — PAIN DESCRIPTION - FREQUENCY
FREQUENCY: INTERMITTENT
FREQUENCY: CONTINUOUS

## 2022-11-04 ASSESSMENT — PAIN DESCRIPTION - DESCRIPTORS
DESCRIPTORS: SPASM
DESCRIPTORS: STABBING;SHARP
DESCRIPTORS: SHARP;DISCOMFORT

## 2022-11-04 ASSESSMENT — PAIN DESCRIPTION - ORIENTATION
ORIENTATION: LEFT
ORIENTATION: RIGHT;LEFT
ORIENTATION: LEFT
ORIENTATION: LEFT
ORIENTATION: RIGHT
ORIENTATION: LEFT
ORIENTATION: RIGHT

## 2022-11-04 ASSESSMENT — PAIN DESCRIPTION - ONSET: ONSET: ON-GOING

## 2022-11-04 ASSESSMENT — PAIN - FUNCTIONAL ASSESSMENT: PAIN_FUNCTIONAL_ASSESSMENT: PREVENTS OR INTERFERES WITH MANY ACTIVE NOT PASSIVE ACTIVITIES

## 2022-11-04 NOTE — PROGRESS NOTES
Consult confirmed to ortho surgery, instructed to keep pt NPO. Pt updated. Consult placed to wound care. YAJAIRA bed ordered.

## 2022-11-04 NOTE — PROGRESS NOTES
Unable to complete any morning assessment due to patient off the floor for CT then Dialysis and sent to surgery. She was in my care for just a few minutes to get a new IV placed by IV team and I was able to get her vitals and pain assessment.      Electronically signed by Shamika Baltazar RN on 11/4/2022 at 1:45 PM

## 2022-11-04 NOTE — H&P
[address] [facility], [place]                              HISTORY AND PHYSICAL    PATIENT NAME: Vivek Merchant                      :        1950  MED REC NO:   [id number]                            ROOM:  ACCOUNT NO:   [de-identified]                           ADMIT DATE: 2022  PROVIDER:     Amanda Castaneda MD    CHIEF COMPLAINT:  Fall with fracture of the distal right femur and also  the distal left ulna and radius. HISTORY OF PRESENT ILLNESS:  This is a 69-year-old with chronic kidney  disease, on hemodialysis, history of orthostatic hypotension, and  diabetes mellitus, who fell at home and fractured her right distal femur  and also fractured her ulnar and radius of the left wrist.  The patient  has already been seen by Orthopedics and will need surgery. The patient  denies any chest pain or shortness of breath. She gets dialysis three  days a week. RECENT AND PRESENT MEDICATIONS:  See med rec in the chart. PAST MEDICAL HISTORY:  As described above. She does have a chronic  sacral decubitus, which was present on admission. She has a previous  history of hypertension. She has chronic anemia due to chronic kidney  disease. She has severe osteoarthritis of both knees. Medications are  listed. SOCIAL HISTORY:  She does not drink. She does not smoke. FAMILY MEDICAL HISTORY:  Noncontributory. ALLERGIES:  None known. REVIEW OF SYSTEMS:  SKIN AND LYMPHATICS:  Once again, she has a chronic sacral decubiti. DIGESTIVE:  Denies nausea, vomiting, diarrhea, melena, hematochezia, or  hematemesis. GENITOURINARY:  Chronic dialysis. Denies dysuria, frequency, or  hematuria. CARDIAC:  Denies chest pain, orthopnea, or PND. Denies shortness of  breath. MUSCULOSKELETAL:  Obviously right leg pain due to distal femur fracture  and left wrist pain due to ulna and radial fractures.     PHYSICAL EXAMINATION:  GENERAL:  The patient is obviously in some distress due to pain. VITAL SIGNS:  Temperature 98.5, pulse 83, respirations 19 and  non-labored, blood pressure 138/60, O2 sat on room air is 100%. SKIN:  Shows slight pallor, but no jaundice. CHEST:  Clear to auscultation. HEART:  Regular. ABDOMEN:  Obese, soft, and nontender at this point. MUSCULOSKELETAL:  Lower extremities reveal no edema. Diminished, but  present peripheral pulses. NEUROLOGICAL:  She is awake and alert with no focal neurological  deficits. DIAGNOSTIC IMPRESSIONS:  1. Right distal femur fracture and left ulnar radial fracture. 2.  Chronic hemodialysis. 3.  IDDM. 4.  Orthostatic hypotension. 5.  Chronic sacral decubitus. PLAN:  The patient is going to have surgery for her legs, I believe  through Orthopedics today. We will consult Nephrology for her  hemodialysis. We will give her IV or p.o. pain medications and will  need inpatient rehab once she is stable. DISCHARGE PLAN:  Most likely to subacute rehab once stable.         Luanne Adames MD    D: 11/04/2022 16:36:49       T: 11/04/2022 16:39:58     [email or web address]  Job#: [id number]     Doc#: [id number]    CC:

## 2022-11-04 NOTE — ANESTHESIA POSTPROCEDURE EVALUATION
Department of Anesthesiology  Postprocedure Note    Patient: Francine Carpenter  MRN: [id number]  YOB: 1950  Date of evaluation: 11/4/2022      Procedure Summary       Date: 11/04/22 Room / Location: [facility] OR 05 / [facility]    Anesthesia Start: 1995 Anesthesia Stop:     Procedures:       RIGHT DISTAL FEMUR OPEN REDUCTION INTERNAL FIXATION ++SYNTHES++ (Right)      LEFT DISTAL RADIUS OPEN REDUCTION INTERNAL FIXATION    ++SYNTHES++ (Left) Diagnosis:       Closed fracture of femur, unspecified fracture morphology, unspecified laterality, unspecified portion of femur, initial encounter ([facility]      (Closed fracture of femur, unspecified fracture morphology, unspecified laterality, unspecified portion of femur, initial encounter ([facility] Aline Khanna)    Surgeons: Manas Reeves MD Responsible Provider: Marleni Santizo MD    Anesthesia Type: general ASA Status: 4            Anesthesia Type: General    Nuzhat Phase I:      Nuzhat Phase II:        Anesthesia Post Evaluation    Patient location during evaluation: PACU  Patient participation: complete - patient participated  Level of consciousness: awake  Pain score: 3  Airway patency: patent  Nausea & Vomiting: no nausea and no vomiting  Complications: no  Cardiovascular status: blood pressure returned to baseline  Respiratory status: acceptable  Hydration status: euvolemic

## 2022-11-04 NOTE — DISCHARGE INSTRUCTIONS
DISCHARGE INSTRUCTIONS TO HOME FOLLOWING SURGERY    ACTIVITY INSTRUCTIONS:    Elevate extremity to help reduce swelling. Use Purple Pillow for elevation of hand/arm   Use sling as needed. No heavy lifting, pushing, pulling or strenuous activity  Nonweightbearing to the right lower extremity. Keep knee immobilizer in place. Forearm weightbearing to the left upper extremity    WOUND/DRESSING INSTRUCTIONS:  Always ensure you and your care giver clean hands before and after caring for the wound. Keep the dressing, cast, or splint clean and dry until seen in office. Do not remove dressing   OK to shower- Keep your dressing dry. Can ice surgical region to help reduce swelling, Do not apply ice to fingers or toes       MEDICATION INSTRUCTIONS:  Take pain medicine as directed. When taking pain medications, you may experience dizziness or drowsiness. Do not drink alcohol or drive when taking these medications. Do not take any other medication containing acetaminophen (Tylenol) while taking the prescribed pain medication. Ibuprofen, Aleve, Motrin, or Naproxen are okay but should not be taken on an empty stomach. *Watch for these significant complications:  Call physician if these or any other problems occur:  Fever over 101°, redness, swelling or warmth at the operative site  Unrelieved nausea    Foul smelling or cloudy drainage at the operative site   Unrelieved pain  Breathing issues such as shortness of breath or difficulty breathing    Blood soaked dressing. (Some oozing may be normal)     Numb, pale, blue, cold or tingling extremity       Make an appointment to be seen 8-10 days after surgery  FOLLOW-UP CARE:    Dr. Monica Powell.  Jimi Brunner [address] [place] [address]  [phone]

## 2022-11-04 NOTE — BRIEF OP NOTE
Brief Postoperative Note      Patient: Renu Godoy  YOB: 1950  MRN: [id number]    Date of Procedure: 11/4/2022    Pre-Op Diagnosis: Closed fracture of femur, unspecified fracture morphology, unspecified laterality, unspecified portion of femur, initial encounter ([facility] [S72.90XA]    Post-Op Diagnosis: Same       Procedure(s):  RIGHT DISTAL FEMUR OPEN REDUCTION INTERNAL FIXATION ++SYNTHES++  LEFT DISTAL RADIUS OPEN REDUCTION INTERNAL FIXATION    ++SYNTHES++    Surgeon(s):  Marylin Silveira MD    Assistant:  Physician Assistant: MIGUEL Aldrich    Anesthesia: General    Estimated Blood Loss (mL): Minimal    Complications: None    Specimens:   * No specimens in log *    Implants:  Implant Name Type Inv. Item Serial No.  Lot No. LRB No. Used Action   NAIL RFNA 5 DEG BEND [id number] ST - [id number]  NAIL RFNA 5 DEG BEND [id number] ST  [facility] [id number]  1 Implanted   SCREW LCK F/IM NAIL 5X70MM XL25 STR - [id number]  SCREW LCK F/IM NAIL 5X70MM XL25 STR  [facility] [id number]  1 Implanted   SCREW LK F/IM NAIL 5X72MM XL25 STERILE - [id number]  SCREW LK F/IM NAIL 5X72MM XL25 STERILE  [facility] proteonomix [id number]  1 Implanted   WASHER LKNG STTSCH RFNA 5 DEG RT ST - [id number]  WASHER LKNG STTSCH RFNA 5 DEG RT ST  [facility] [id number]  1 Implanted   SCREW OPTILINK VA LCKING SLF -TP T25 SD 5.0X70MM - [id number]  SCREW OPTILINK VA LCKING SLF -TP T25 SD 5.0X70MM  [facility] [employer] [facility]-   1 Implanted   SCREW VA LCK SCREOPTILINK 5.0X55MM - [id number]  SCREW VA LCK SCREOPTILINK 5.0X55MM  [facility]-   1 Implanted   SCREW BNE L75MM DIA3. 5MM PROX TIB S STL ST FULL THRD T15 - [id number]  SCREW BNE L75MM DIA3. 5MM PROX TIB S STL ST FULL THRD T15  [employer]-WD   2 Implanted   SCREW BNE L65MM DIA3. 5MM PROX TIB S STL ST FULL THRD T15 - [id number]  SCREW BNE L65MM DIA3. 5MM PROX TIB S STL ST FULL THRD T15  [employer] [place]-WD   2 Implanted   SCREW LK F/IM NAIL 5X38MM Eliseo Heath - [id number] SCREW LK F/IM NAIL 5X38MM XL25 SILE  [facility] [id number]  1 Implanted   PLATE BNE [id number] STD 9 H L DST VOLAR RAD S STL TWO CLMN - [id number]  PLATE BNE [id number] STD 9 H L DST VOLAR RAD S STL TWO CLMN  DEPU[employer]NTHES [place]-WD   1 Implanted   SCREW BNE L18MM DIA2.4MM DST RAD VOLAR S STL ST PAT ANG VASYL - [id number]  SCREW BNE L18MM DIA2.4MM DST RAD VOLAR S STL ST PAT ANG VASYL  DEPUY [employer]ES [place]-WD   4 Implanted   SCREW BNE L16MM DIA2.4MM DST RAD VOLAR S STL ST PAT ANG VASYL - [id number]  SCREW BNE L16MM DIA2.4MM DST RAD VOLAR S STL ST PAT ANG VASYL  [employer]-WD   2 Implanted   K WIRE FIX L150MM DIA1. 6MM S STL TRCR PNT - [id number]  K WIRE FIX L150MM DIA1. 6MM S STL TRCR PNT  DEPUY [employer]-WD   1 Implanted   K WIRE FIX L150MM DIA1. 25MM S STL TRCR PNT - [id number]  K WIRE FIX L150MM DIA1. 25MM S STL TRCR PNT  [employer]-WD   1 Implanted   SCREW BNE L12MM DIA2.7MM PARIS S STL ST T8 STARDRV RECESS - [id number]  SCREW BNE L12MM DIA2.7MM PARIS S STL ST T8 STARDRV RECESS  DEPUY [employer]-WD   3 Implanted   SCREW BNE L12MM [id number] PARIS S STL ST T8 STARDRV RECESS - [id number]  SCREW BNE L12MM [id number] PARIS S STL ST T8 STARDRV RECESS  DEPUY SYNTHES [place]-WD   1 Implanted         Drains: * No LDAs found *    Findings: see op note    Electronically signed by MIGUEL Merida on 11/4/2022 at 6:35 PM

## 2022-11-04 NOTE — ED NOTES
Confirmed receipt of SBAR with 6th floor     [facility]  11/04/22 5324

## 2022-11-04 NOTE — PROGRESS NOTES
Department of Internal Medicine  Nephrology Attending Progress Note        SUBJECTIVE:  Mrs Moctezuma S, [facility] is admitted after tripping walker and falling to the floor,  she presents to the hospital with significant pain over her right knee and left arm, x-rays confirmed fracture of the distal femur with displacement of the medial femoral condyle, she was also found to have fractures of the distal left radius and ulnar, right large toe is also ecchymotic. Patient states she had recent debridement of her sacral decubitus which was causing a lot of discomfort. But on the whole she actually has been doing better having been able to be discharged back home after being in a nursing facility for 3 years. PMH  ESRD on dialysis    History of fall with fracture of distal right femur, left distal radius and ulnar  Type 2 diabetes mellitus with neuropathy, nephropathy  Hypertension  Anemia of chronic kidney disease  History of osteoarthritis  Secondary hyperparathyroid disease of renal origin  History of COVID-19 infection  History of sacral ulcer with recent debridement  Hyperlipidemia  Elevated BMI  History of sarcoidosis  History of gastroesophageal reflux disease  History of renal lithiasis  History of cholecystectomy  History of  x3  Upper endoscopy showing gastritis duodenitis  History of remote tobacco use quit 10 years ago    Physical Exam:    Vitals:    22 0830   BP: 138/60   Pulse: 83   Resp: 19   Temp: 98.5 °F (36.9 °C)   SpO2: 100%       I/O last 24 hours:  Intake/Output na:    Weight: 88.9    General Appearance:  awake in moderate distress  Skin: sacral decubitus bandage  Neck:  neck- supple, no mass, non-tender and no bruits  Lungs: Distant breath sounds no wheezing or rhonchi  Heart: Regular rhythm no murmur rub     Abdominal: Abdomen soft, non-tender. BS normal. No masses,  No organomegaly  Extremities: trace pedal edema, swollen rt knedd.  Lt arm in cast, rt ecchymotic toe  Peripheral Pulses:  +2    DATA:    CBC with Differential:    Lab Results   Component Value Date/Time    WBC 9.1 11/04/2022 09:10 AM    RBC 3.01 11/04/2022 09:10 AM    HGB 10.5 11/04/2022 09:10 AM    HCT 33.4 11/04/2022 09:10 AM     11/04/2022 09:10 AM    .0 11/04/2022 09:10 AM    MCH 34.9 11/04/2022 09:10 AM    MCHC 31.4 11/04/2022 09:10 AM    RDW 15.4 11/04/2022 09:10 AM    NRBC 0.9 05/25/2020 02:00 PM    SEGSPCT 71 08/16/2013 05:00 AM    BANDSPCT 1 03/29/2016 07:37 PM    METASPCT 0.9 05/10/2020 03:45 AM    LYMPHOPCT 4.8 11/03/2022 10:12 PM    PROMYELOPCT 0.9 05/09/2020 04:15 AM    MONOPCT 7.5 11/03/2022 10:12 PM    MYELOPCT 1.7 05/11/2020 04:45 AM    BASOPCT 0.2 11/03/2022 10:12 PM    MONOSABS 0.97 11/03/2022 10:12 PM    LYMPHSABS 0.63 11/03/2022 10:12 PM    EOSABS 0.02 11/03/2022 10:12 PM    BASOSABS 0.03 11/03/2022 10:12 PM     CMP:    Lab Results   Component Value Date/Time     11/04/2022 09:10 AM    K 5.4 11/04/2022 09:10 AM    K 6.4 07/08/2022 09:42 PM     11/04/2022 09:10 AM    CO2 26 11/04/2022 09:10 AM    BUN 37 11/04/2022 09:10 AM    CREATININE 6.2 11/04/2022 09:10 AM    GFRAA 7 10/12/2022 03:49 AM    LABGLOM 7 11/04/2022 09:10 AM    GLUCOSE 177 11/04/2022 09:10 AM    GLUCOSE 149 10/12/2011 09:59 AM    PROT 6.4 11/03/2022 10:12 PM    LABALBU 3.6 11/03/2022 10:12 PM    LABALBU 4.1 10/12/2011 09:59 AM    CALCIUM 9.6 11/04/2022 09:10 AM    BILITOT 0.3 11/03/2022 10:12 PM    ALKPHOS 100 11/03/2022 10:12 PM    AST 23 11/03/2022 10:12 PM    ALT 17 11/03/2022 10:12 PM     Magnesium:    Lab Results   Component Value Date/Time    MG 2.5 07/14/2022 10:34 AM     Phosphorus:    Lab Results   Component Value Date/Time    PHOS 6.0 07/14/2022 10:34 AM            busPIRone  10 mg Oral BID    busPIRone  5 mg Oral Nightly    escitalopram  10 mg Oral Daily    insulin glargine  6 Units SubCUTAneous Nightly    metoprolol succinate  25 mg Oral Daily    midodrine  15 mg Oral Once per day on Mon Wed Fri    polyethylene glycol  17 g Oral Daily    vitamin D  2,000 Units Oral Daily    sevelamer  800 mg Oral TID     sodium chloride flush  5-40 mL IntraVENous 2 times per day    heparin (porcine)  5,000 Units SubCUTAneous 3 times per day    insulin lispro  0-8 Units SubCUTAneous TID WC    insulin lispro  0-4 Units SubCUTAneous Nightly    gabapentin  100 mg Oral TID    sodium chloride flush  5-40 mL IntraVENous 2 times per day    ceFAZolin (ANCEF) IVPB  2,000 mg IntraVENous On Call to OR      sodium chloride      dextrose      sodium chloride       bisacodyl, magnesium hydroxide, prochlorperazine, traZODone, sodium chloride flush, sodium chloride, ondansetron **OR** ondansetron, polyethylene glycol, acetaminophen **OR** acetaminophen, HYDROmorphone, glucose, dextrose bolus **OR** dextrose bolus, glucagon (rDNA), dextrose, sodium chloride flush, sodium chloride, oxyCODONE-acetaminophen    IMPRESSION/RECOMMENDATIONS:      End-stage renal disease continue dialysis Monday Wednesday Friday discussed with orthopedics they desire dialysis be done this morning with surgery this afternoon  hyperkalemia mild .     History of sacral decubitus   Anemia of chronic disease will likely need ARUN therapy initiated postop  Secondary hyperparathyroid disease of renal origin on ferrous citrate at home   Fracture of rt  distal  femur  Fracture of left distal radius and ulnar for  OR later today    Jessica Raza MD  11/4/2022 9:58 AM

## 2022-11-04 NOTE — CARE COORDINATION
11/4/2022  Social Work Discharge Planning:Rt femur fx and coccyx wound. Sw went to see Pt in dialysis;however, they were taking her out for ORIF. Chart screened. Pt is from home with her son Becky Flores in a 2 story home. Uses a ww and WC. Pt receives dialysis at [phone] m,w,f. Pt has a history of [facility] [place] and [place]. Pt is currently;y active with [address]. OLIVERIO order would be needed if Pt returns home. Pt will need therapy orders and evals when able.  Electronically signed by SHANTA Juan on 11/4/2022 at 12:46 PM

## 2022-11-04 NOTE — FLOWSHEET NOTE
Pt completed 3 hrs of HD on a 2K bath with 1.3L of UF removed safely. 11/04/22 1335   Vital Signs   /84   Temp 98.5 °F (36.9 °C)   Heart Rate 69   Resp 18   Weight 194 lb 0.1 oz (88 kg)   Weight Method Bed scale   Percent Weight Change -0.23   Pain Assessment   Pain Assessment None - Denies Pain   Pain Level 2   Pain Type Acute pain   Post-Hemodialysis Assessment   Post-Treatment Procedures Blood returned; Access bleeding time < 10 minutes   Machine Disinfection Process Acid/Vinegar Clean;Heat Disinfect; Exterior Machine Disinfection   Rinseback Volume (ml) 300 ml   Blood Volume Processed (Liters) 36.5 l/min   Dialyzer Clearance Lightly streaked   Duration of Treatment (minutes) 180 minutes   Hemodialysis Output (ml) 1600 ml   Tolerated Treatment Good   Patient Response to Treatment tolerated well; blood returned; stasis achieved; post report to [facility]; pt stable at discharge and transported to OR   Bilateral Breath Sounds Diminished   Edema Generalized   Time Off 1328   Patient Disposition Return to room

## 2022-11-04 NOTE — PROGRESS NOTES
Call placed to Dr. Ricardo Frederick answering service, aware of need for admission orders and something for pain.

## 2022-11-04 NOTE — ED NOTES
Sugar tong splint applied to left wrist as ordered. Pt refused posterior long leg splint. Pt verbalized understanding of risks of not applying the splint. Pt to sign refusal. Attending and resident notified.      Jeffrey Erickson RN  11/04/22 0398

## 2022-11-04 NOTE — ED PROVIDER NOTES
ED  Provider Note  Admit Date/RoomTime: 11/3/2022  8:16 PM  ED Room: OR POOL/NONE      History of Present Illness:  11/3/22, Time: 8:21 PM EDT  Chief Complaint   Patient presents with    Lajoyce Jorden on right knee having knee and left wrist pain, no LOC, no blood thinners     ATTENDING PROVIDER ATTESTATION:     Lawson Arizmendi presented to the emergency department for evaluation of Fall (Earvin Seller on right knee having knee and left wrist pain, no LOC, no blood thinners)  . The patient was initially evaluated by the Medical Resident. Please see their note for further details, HPI, and ED course. I have reviewed & discussed the patient's case in details, including pertinent history & exam findings, with the Medical Resident and have personally participated in and/or performed the history, physical examination, medical decision-making, and procedure(s). I agree with all pertinent clinical information and any changes or corrections, if present, are noted below or in my separate documentation. I have reviewed my findings and recommendations with the assigned Medical Resident, patient, and family member(s) present at the time of disposition. I have performed a history and physical examination of this patient and directly supervised the resident caring for this patient. I have directly supervised any procedures performed by the resident and was present for the procedure including all critical portions of the procedure(s). I, Dr. Jose Blackburn, am the primary provider of record    I have personally reviewed EKG(s) performed for this patient and agree with resident EKG interpretation below      Lawson Arizmendi is a 67 y.o. female presenting to the ED for \"I need pain meds. \" States had mechanical fall without prodrome tonight \"got tangled up in her walker\" and fell to ground. Pain in L arm especially wrist, pain in R knee. No weakness or numbness, no head strike or LOC.  No weakness or numbness of lower extremities, no saddle anaesthesia, no bowel or bladder incontinence, no urinary retention. Unable to get up and ambulate since event. Severe pain. No pain meds PTA. Onset: sudden   Timing: one episode    Duration: hours   Associated symptoms: as above, also no HA or blurry vision, no neck pain or stiffness  Severity: severe   Exacerbated by: fall  Relieved by: none         Review of Systems:   A complete review of systems was performed and pertinent positives and negatives are stated within HPI, all other systems reviewed and are negative.        --------------------------------------------- PATIENT HISTORY--------------------------------------------  Past Medical History:  has a past medical history of Anemia in chronic kidney disease, Anxiety and depression, Arthritis, Chronic pain syndrome, COVID-19, COVID-19, Decreased dorsalis pedis pulse, Diabetes mellitus ([address] Dysphagia, oral phase, ESRD on hemodialysis ([address] GERD (gastroesophageal reflux disease), Hemodialysis patient ([address] Hyperkalemia, Hypertension, Hypertensive kidney disease with stage 4 chronic kidney disease ([address] Insomnia, Kidney stones, MDD (major depressive disorder), Moderate protein-calorie malnutrition ([address] Morbid obesity ([address] Muscle weakness (generalized), Obesity, Pressure injury of sacral region, stage 3 ([address] Pressure ulcer of sacral region, Sacral pressure ulcer, Unspecified osteoarthritis, unspecified site, and Weakness generalized. Past Surgical History:  has a past surgical history that includes Foot surgery ( ); Cystocopy ( );  section (x3); Upper gastrointestinal endoscopy (2.18.15); Cholecystectomy (3/31/16); Abdomen surgery (N/A, 2020); Upper gastrointestinal endoscopy (N/A, 2020); Upper gastrointestinal endoscopy (N/A, 2020); vascular surgery (N/A, 2021); Dialysis fistula creation (Left, 2021); and Dialysis fistula creation (Right, 2021).     Family History: family history includes Cancer in her sister. . Unless otherwise noted, family history is non contributory    Social History:  reports that she quit smoking about 11 years ago. She has a 30.00 pack-year smoking history. She has never used smokeless tobacco. She reports that she does not drink alcohol and does not use drugs. The patients home medications have been reviewed. Allergies: Patient has no known allergies. I have reviewed the past medical history, past surgical history, social history, and family history    ---------------------------------------------------PHYSICAL EXAM--------------------------------------    Constitutional/General: Alert and oriented x3, very anxious   Head: Normocephalic and atraumatic  Eyes: PERRL, EOMI, sclera non icteric  ENT: Oropharynx clear, handling secretions, no trismus  Neck: Supple, full ROM, no stridor, no meningismus  Respiratory: lctab  Cardiovascular: RRR, no R/G/M, 2+ peripheral pulses  Chest: No chest wall tenderness, equal chest rise  Gastrointestinal:  sntnd  Musculoskeletal: Extremities warm and well perfused, moving all extremities, ecchymosis of L UE (states is from dialysis); sensation intact at left hand, no grossly visible deformities   Skin: skin warm and dry. No rashes. Neurologic: No focal deficits, strength and sensation grossly intact   Psychiatric: Normal Affect, behavior normal      ED Course as of 11/04/22 1727   Fri Nov 04, 2022   0023 Bedside hematoma block was performed. Patient was reevaluated sometime after hematoma block and is experiencing significant relief of the area. Patient was placed in finger traps on reevaluation and is now resting more comfortably. [VG]   0100 Case was discussed with Dr. Corie Abernathy, orthopedic surgeon, who recommends that we splint the patient's fractures. He states that the patient may be able to be managed at [facility] assuming that the medicine team is willing to admit the patient.   If not, we may transfer the patient downtown for medical admission. [VG]   0151 Patient was successfully placed in a left wrist sugar-tong splint. Patient is refusing the posterior long-leg splint for the right lower extremity. Patient was explained the risks of refusing the splint and potentially worsening her fracture or causing permanent damage to her. Patient understands these risks and is still refusing. [VG]      ED Course User Index  [VG] Angela Kincaid MD       -------------------------------------------------- RESULTS -------------------------------------------------  I have personally reviewed all laboratory and imaging results for this patient. Results are listed below.      LABS: (Lab results interpreted by me)  Results for orders placed or performed during the hospital encounter of 11/03/22   CBC with Auto Differential   Result Value Ref Range    WBC 13.0 (H) 4.5 - 11.5 E9/L    RBC 3.19 (L) 3.50 - 5.50 E12/L    Hemoglobin 11.1 (L) 11.5 - 15.5 g/dL    Hematocrit 35.3 34.0 - 48.0 %    .7 (H) 80.0 - 99.9 fL    MCH 34.8 26.0 - 35.0 pg    MCHC 31.4 (L) 32.0 - 34.5 %    RDW 15.0 11.5 - 15.0 fL    Platelets 029 898 - 451 E9/L    MPV 10.9 7.0 - 12.0 fL    Neutrophils % 86.8 (H) 43.0 - 80.0 %    Immature Granulocytes % 0.5 0.0 - 5.0 %    Lymphocytes % 4.8 (L) 20.0 - 42.0 %    Monocytes % 7.5 2.0 - 12.0 %    Eosinophils % 0.2 0.0 - 6.0 %    Basophils % 0.2 0.0 - 2.0 %    Neutrophils Absolute 11.28 (H) 1.80 - 7.30 E9/L    Immature Granulocytes # 0.06 E9/L    Lymphocytes Absolute 0.63 (L) 1.50 - 4.00 E9/L    Monocytes Absolute 0.97 (H) 0.10 - 0.95 E9/L    Eosinophils Absolute 0.02 (L) 0.05 - 0.50 E9/L    Basophils Absolute 0.03 0.00 - 0.20 E9/L    RBC Morphology Normal    CMP   Result Value Ref Range    Sodium 139 132 - 146 mmol/L    Potassium 4.7 3.5 - 5.0 mmol/L    Chloride 97 (L) 98 - 107 mmol/L    CO2 27 22 - 29 mmol/L    Anion Gap 15 7 - 16 mmol/L    Glucose 242 (H) 74 - 99 mg/dL    BUN 34 (H) 6 - 23 mg/dL    Creatinine 5.4 (H) 0.5 - 1.0 mg/dL    Est, Glom Filt Rate 8 >=60 mL/min/1.73    Calcium 9.7 8.6 - 10.2 mg/dL    Total Protein 6.4 6.4 - 8.3 g/dL    Albumin 3.6 3.5 - 5.2 g/dL    Total Bilirubin 0.3 0.0 - 1.2 mg/dL    Alkaline Phosphatase 100 35 - 104 U/L    ALT 17 0 - 32 U/L    AST 23 0 - 31 U/L   Basic Metabolic Panel   Result Value Ref Range    Sodium 141 132 - 146 mmol/L    Potassium 5.4 (H) 3.5 - 5.0 mmol/L    Chloride 100 98 - 107 mmol/L    CO2 26 22 - 29 mmol/L    Anion Gap 15 7 - 16 mmol/L    Glucose 177 (H) 74 - 99 mg/dL    BUN 37 (H) 6 - 23 mg/dL    Creatinine 6.2 (H) 0.5 - 1.0 mg/dL    Est, Glom Filt Rate 7 >=60 mL/min/1.73    Calcium 9.6 8.6 - 10.2 mg/dL   CBC   Result Value Ref Range    WBC 9.1 4.5 - 11.5 E9/L    RBC 3.01 (L) 3.50 - 5.50 E12/L    Hemoglobin 10.5 (L) 11.5 - 15.5 g/dL    Hematocrit 33.4 (L) 34.0 - 48.0 %    .0 (H) 80.0 - 99.9 fL    MCH 34.9 26.0 - 35.0 pg    MCHC 31.4 (L) 32.0 - 34.5 %    RDW 15.4 (H) 11.5 - 15.0 fL    Platelets 953 761 - 298 E9/L    MPV 10.7 7.0 - 12.0 fL   POCT Glucose   Result Value Ref Range    Meter Glucose 161 (H) 74 - 99 mg/dL   TYPE AND SCREEN   Result Value Ref Range    ABO/Rh O POS     Antibody Screen NEG    ,       RADIOLOGY:  Imaging interpreted by Radiologist unless otherwise specified  CT KNEE RIGHT WO CONTRAST   Final Result   1. Comminuted, supracondylar fracture of the distal right femur with   intra-articular fracture extension to the suprapatellar bursa but no condylar   fracture, intercondylar fracture, or T-shaped fracture. 2.  Underlying advanced osteoarthritis of the right knee. RECOMMENDATIONS:   Unavailable         XR FEMUR RIGHT (MIN 2 VIEWS)   Final Result   Comminuted fracture of the distal femur with medial and posterior   displacement, cannot rule out a proximal tibia fracture would recommend   further evaluation with a CT scan. XR SHOULDER LEFT (MIN 2 VIEWS)   Final Result   No acute abnormality.          XR HAND LEFT (MIN 3 VIEWS)   Final Result   Distal left radius and ulna impaction fractures. Left wrist soft tissue   swelling. XR KNEE RIGHT (1-2 VIEWS)   Final Result   Displaced distal femoral shaft fracture. XR ELBOW LEFT (2 VIEWS)   Final Result   No evidence of fracture or dislocation involving the left elbow. CT Head W/O Contrast   Final Result   No acute intracranial abnormality. CT CSpine W/O Contrast   Final Result   No acute abnormality of the cervical spine. FLUORO FOR SURGICAL PROCEDURES    (Results Pending)   FLUORO FOR SURGICAL PROCEDURES    (Results Pending)       ------------------------- NURSING NOTES AND VITALS REVIEWED ---------------------------  The nursing notes within the ED encounter and vital signs as below have been reviewed by myself  /84   Pulse 69   Temp 98.5 °F (36.9 °C)   Resp 18   Ht 5' 1\" (1.549 m)   Wt 194 lb 0.1 oz (88 kg)   SpO2 100%   BMI 36.66 kg/m²      The patients available past medical records and past encounters were reviewed.         ------------------------------ ED COURSE/MEDICAL DECISION MAKING----------------------  Medications   bisacodyl (DULCOLAX) EC tablet 10 mg (has no administration in time range)   busPIRone (BUSPAR) tablet 10 mg (has no administration in time range)   busPIRone (BUSPAR) tablet 5 mg (has no administration in time range)   escitalopram (LEXAPRO) tablet 10 mg (has no administration in time range)   insulin glargine (LANTUS) injection vial 6 Units (has no administration in time range)   magnesium hydroxide (MILK OF MAGNESIA) 400 MG/5ML suspension 30 mL (has no administration in time range)   metoprolol succinate (TOPROL XL) extended release tablet 25 mg (has no administration in time range)   midodrine (PROAMATINE) tablet 15 mg (has no administration in time range)   polyethylene glycol (GLYCOLAX) packet 17 g (has no administration in time range)   prochlorperazine (COMPAZINE) tablet 5 mg (has no administration in time range)   traZODone (DESYREL) tablet 25 mg (has no administration in time range)   vitamin D (CHOLECALCIFEROL) tablet 2,000 Units (has no administration in time range)   sevelamer (RENVELA) tablet 800 mg (800 mg Oral Not Given 11/4/22 1036)   sodium chloride flush 0.9 % injection 5-40 mL (has no administration in time range)   sodium chloride flush 0.9 % injection 5-40 mL (has no administration in time range)   0.9 % sodium chloride infusion (has no administration in time range)   heparin (porcine) injection 5,000 Units (5,000 Units SubCUTAneous Not Given 11/4/22 0524)   ondansetron (ZOFRAN-ODT) disintegrating tablet 4 mg (has no administration in time range)     Or   ondansetron (ZOFRAN) injection 4 mg (has no administration in time range)   polyethylene glycol (GLYCOLAX) packet 17 g (has no administration in time range)   acetaminophen (TYLENOL) tablet 650 mg (has no administration in time range)     Or   acetaminophen (TYLENOL) suppository 650 mg (has no administration in time range)   HYDROmorphone (DILAUDID) injection 1 mg (1 mg IntraVENous Given 11/4/22 0939)   glucose chewable tablet 16 g (has no administration in time range)   dextrose bolus 10% 125 mL (has no administration in time range)     Or   dextrose bolus 10% 250 mL (has no administration in time range)   glucagon (rDNA) injection 1 mg (has no administration in time range)   dextrose 10 % infusion (has no administration in time range)   insulin lispro (HUMALOG) injection vial 0-8 Units (0 Units SubCUTAneous Not Given 11/4/22 1037)   insulin lispro (HUMALOG) injection vial 0-4 Units (has no administration in time range)   gabapentin (NEURONTIN) capsule 100 mg (has no administration in time range)   sodium chloride flush 0.9 % injection 5-40 mL (10 mLs IntraVENous Given 11/4/22 1036)   sodium chloride flush 0.9 % injection 5-40 mL (has no administration in time range)   0.9 % sodium chloride infusion (has no administration in time range)   oxyCODONE-acetaminophen (PERCOCET)  MG per tablet 1 tablet (has no administration in time range)   fentaNYL (SUBLIMAZE) injection 25 mcg (25 mcg IntraMUSCular Given 11/3/22 2048)   lidocaine 1 % injection 20 mL (20 mLs IntraDERmal Given 11/3/22 2341)   HYDROmorphone (DILAUDID) injection 1 mg (1 mg IntraVENous Given 11/3/22 2342)   HYDROmorphone (DILAUDID) injection 1 mg (1 mg IntraVENous Given 11/4/22 0113)   ceFAZolin (ANCEF) 2,000 mg in sterile water 20 mL IV syringe (2,000 mg IntraVENous New Bag 11/4/22 1628)   midazolam PF (VERSED) injection 2 mg (2 mg IntraVENous Given 11/4/22 1444)       I, Dr. Brenna Short, am the primary provider of record    Medical Decision Making:   The patient is a 59-year-old female with past medical history of end-stage renal disease on hemodialysis Monday, Wednesday Friday, type 2 diabetes on insulin, and hypertension presenting after a fall. At the time of initial examination the patient is hypertensive and tachycardic. Patient was medicated with fentanyl and subsequently Dilaudid for her pain. Labs and imaging reviewed as above. Patient patient was found to have a right distal femur fracture and left distal radius and ulnar fractures. Case was discussed with Dr. Jaswinder Benavidez, orthopedic surgeon, who recommends that we splint the patient as planned and recommends medical admission at this time. Hematoma block was performed and the patient was placed in finger traps to align her wrist.  Patient was then subsequently splinted. Patient experiencing extreme discomfort and is refusing lower limb splinting at this time due to pain. Patient was explained multiple times that this may worsen her condition and cause permanent damage to her lower extremity. Patient still refusing lower extremity splinting at this time. Case was discussed with Dr. June Olivas, hospitalist, who agrees to admit the patient.   At time of admission, the patient demonstrated good understanding of her condition, had no questions, and was hemodynamically stable. ED Course as of 11/04/22 1727   Fri Nov 04, 2022   0023 Bedside hematoma block was performed. Patient was reevaluated sometime after hematoma block and is experiencing significant relief of the area. Patient was placed in finger traps on reevaluation and is now resting more comfortably. [VG]   0100 Case was discussed with Dr. Luz Chandler, orthopedic surgeon, who recommends that we splint the patient's fractures. He states that the patient may be able to be managed at [facility] assuming that the medicine team is willing to admit the patient. If not, we may transfer the patient downtown for medical admission. [VG]   0151 Patient was successfully placed in a left wrist sugar-tong splint. Patient is refusing the posterior long-leg splint for the right lower extremity. Patient was explained the risks of refusing the splint and potentially worsening her fracture or causing permanent damage to her. Patient understands these risks and is still refusing. [VG]      ED Course User Index  [VG] Brinda Suh MD           ED Counseling: This emergency provider has spoken with the patient and any family present to discuss clinical status, results, plan of care, diagnosis and prognosis as able to be determined at this time. Any questions were answered and patient and/or family/POA are agreeable with the plan.       --------------------------------- IMPRESSION AND DISPOSITION ---------------------------------    IMPRESSION  1. Closed bicondylar fracture of right femur, initial encounter ([facility] [address]    2. Closed fracture of distal ends of left radius and ulna, initial encounter    3. Fall, initial encounter        DISPOSITION  Disposition: Admit to med/surg floor  Patient condition is stable      This report was transcribed using voice recognition software. Every effort was made to ensure accuracy; however, transcription errors may be present.         Brinda Suh MD  Resident  11/04/22 0447

## 2022-11-04 NOTE — ANESTHESIA PRE PROCEDURE
Department of Anesthesiology  Preprocedure Note       Name:  Andria Bland   Age:  67 y.o.  :  1950                                          MRN:  [id number]         Date:  2022      Surgeon: Susan Cai):  Carlee Sanon MD    Procedure: Procedure(s):  RIGHT DISTAL FEMUR OPEN REDUCTION INTERNAL FIXATION ++SYNTHES++  LEFT DISTAL RADIUS OPEN REDUCTION INTERNAL FIXATION    ++SYNTHES++    Medications prior to admission:   Prior to Admission medications    Medication Sig Start Date End Date Taking? Authorizing Provider   insulin glargine (LANTUS) 100 UNIT/ML injection vial Inject 6 Units into the skin nightly 22   Carmelo English MD   glucose 4 g chewable tablet Take 4 tablets by mouth as needed for Low blood sugar 22   Carmelo English MD   prochlorperazine (COMPAZINE) 5 MG tablet Take 1 tablet by mouth every 6 hours as needed for Nausea 22   Carmelo English MD   epoetin derek-epbx (RETACRIT) 25425 UNIT/ML SOLN injection Inject 1 mL into the skin three times a week 22   Carmelo English MD   lidocaine (LMX) 4 % cream Apply topically three times a week Apply topically as needed Mon/Wed/Fri prior to dialysis treatment    Historical Provider, MD   busPIRone (BUSPAR) 10 MG tablet Take 10 mg by mouth 2 times daily    Historical Provider, MD   traZODone (DESYREL) 50 MG tablet Take 25 mg by mouth nightly as needed for Sleep    Historical Provider, MD   acetaminophen (TYLENOL) 500 MG tablet Take 1,000 mg by mouth every 8 hours as needed for Pain (moderate pain 4-6)     Historical Provider, MD   Ferric Citrate (AURYXIA) 1  MG(Fe) TABS Take 2 tablets by mouth 3 times daily (with meals) Take with meals.     Historical Provider, MD   midodrine (PROAMATINE) 5 MG tablet Take 15 mg by mouth three times a week --    Historical Provider, MD   bisacodyl (DULCOLAX) 5 MG EC tablet Take 10 mg by mouth daily as needed for Constipation    Historical Provider, MD   Vitamin D (CHOLECALCIFEROL) 50 MCG (2000) TABS tablet Take 1 tablet by mouth daily 11/20/20   Dre Landon MD   magnesium hydroxide (MILK OF MAGNESIA) 400 MG/5ML suspension Take 30 mLs by mouth daily as needed for Constipation    Historical Provider, MD   metoprolol succinate (TOPROL XL) 25 MG extended release tablet Take 1 tablet by mouth daily 10/12/20   Mirella Sloan MD   escitalopram (LEXAPRO) 10 MG tablet Take 10 mg by mouth daily    Historical Provider, MD   gabapentin (NEURONTIN) 300 MG capsule Take 300 mg by mouth 3 times daily. Historical Provider, MD   busPIRone (BUSPAR) 5 MG tablet Take 5 mg by mouth at bedtime   Patient not taking: Reported on 11/4/2022    Historical Provider, MD   polyethylene glycol (GLYCOLAX) 17 g packet Take 17 g by mouth daily    Historical Provider, MD   senna (SENOKOT) 8.6 MG tablet Take 2 tablets by mouth 2 times daily    Historical Provider, MD       Current medications:    No current facility-administered medications for this visit. No current outpatient medications on file.      Facility-Administered Medications Ordered in Other Visits   Medication Dose Route Frequency Provider Last Rate Last Admin    bisacodyl (DULCOLAX) EC tablet 10 mg  10 mg Oral Daily PRN Mirella Sloan MD        busPIRone (BUSPAR) tablet 10 mg  10 mg Oral BID Mirella Sloan MD        busPIRone (BUSPAR) tablet 5 mg  5 mg Oral Nightly Mirella Sloan MD        escitalopram (LEXAPRO) tablet 10 mg  10 mg Oral Daily Mirella Sloan MD        insulin glargine (LANTUS) injection vial 6 Units  6 Units SubCUTAneous Nightly Mirella Sloan MD        magnesium hydroxide (MILK OF MAGNESIA) 400 MG/5ML suspension 30 mL  30 mL Oral Daily PRN Mirella Sloan MD        metoprolol succinate (TOPROL XL) extended release tablet 25 mg  25 mg Oral Daily Mirella Sloan MD        midodrine (PROAMATINE) tablet 15 mg  15 mg Oral Once per day on Mon Wed Fri Mirella Sloan MD        polyethylene glycol [facility]) packet 17 g  17 g Oral Daily Mirella Sloan MD        prochlorperazine (COMPAZINE) tablet 5 mg  5 mg Oral Q6H PRN Helene Hendricks MD        traZODone (DESYREL) tablet 25 mg  25 mg Oral Nightly PRN Helene Hendricks MD        vitamin D (CHOLECALCIFEROL) tablet 2,000 Units  2,000 Units Oral Daily Helene Hendricks MD        sevelamer (RENVELA) tablet 800 mg  800 mg Oral TID JUSTINA Hendricks MD        sodium chloride flush 0.9 % injection 5-40 mL  5-40 mL IntraVENous 2 times per day Helene Hendricks MD        sodium chloride flush 0.9 % injection 5-40 mL  5-40 mL IntraVENous PRN Helene Hendricks MD        0.9 % sodium chloride infusion   IntraVENous PRN Helene Hendricks MD        heparin (porcine) injection 5,000 Units  5,000 Units SubCUTAneous 3 times per day Helene Hendricks MD        ondansetron (ZOFRAN-ODT) disintegrating tablet 4 mg  4 mg Oral Q8H PRN Helene Hendricks MD        Or    ondansetron [facility]) injection 4 mg  4 mg IntraVENous Q6H PRN Helene Hendricks MD        polyethylene glycol [facility]) packet 17 g  17 g Oral Daily PRN Helene Hendricks MD        acetaminophen (TYLENOL) tablet 650 mg  650 mg Oral Q6H PRN Helene Hendricks MD        Or    acetaminophen (TYLENOL) suppository 650 mg  650 mg Rectal Q6H PRN Helene Hendricks MD        HYDROmorphone (DILAUDID) injection 1 mg  1 mg IntraVENous Q4H PRN Helene Hendricks MD   1 mg at 11/04/22 8678    glucose chewable tablet 16 g  4 tablet Oral PRN Helene Hendricks MD        dextrose bolus 10% 125 mL  125 mL IntraVENous PRN Helene Hendricks MD        Or    dextrose bolus 10% 250 mL  250 mL IntraVENous PRN Helene Hendricks MD        glucagon (rDNA) injection 1 mg  1 mg SubCUTAneous PRN Helene Hendricks MD        dextrose 10 % infusion   IntraVENous Continuous PRN Helene Hendricks MD        insulin lispro (HUMALOG) injection vial 0-8 Units  0-8 Units SubCUTAneous TID JUSTINA Hendricks MD        insulin lispro (HUMALOG) injection vial 0-4 Units  0-4 Units SubCUTAneous Nightly Helene Hendricks MD        gabapentin (NEURONTIN) capsule 100 mg  100 mg Oral TID Helene Hendricks MD        sodium chloride flush 0.9 % injection 5-40 mL 5-40 mL IntraVENous 2 times per day Ata Waterman MD   10 mL at 11/04/22 1036    sodium chloride flush 0.9 % injection 5-40 mL  5-40 mL IntraVENous PRN Ata Waterman MD        0.9 % sodium chloride infusion   IntraVENous PRN Ata Waterman MD        ceFAZolin (ANCEF) 2,000 mg in sterile water 20 mL IV syringe  2,000 mg IntraVENous On Call to Meliza Vyas MD        oxyCODONE-acetaminophen (PERCOCET)  MG per tablet 1 tablet  1 tablet Oral Q4H PRN Keren Clay MD           Allergies:  No Known Allergies    Problem List:    Patient Active Problem List   Diagnosis Code    Neurologic gait dysfunction R26.9    Diabetes mellitus ([address] E11.9    Hypertension I10    Arthritis M19.90    Knee problem M25.9    Anemia due to stage 3 chronic kidney disease N18.30, D63.1    Primary osteoarthritis of both knees M17.0    Moderate protein-calorie malnutrition (HCC) E44.0    Pressure injury of sacral region, stage 4 (HCC) L89.154    Pressure injury of right hip, stage 3 ([address] L89.213    Left bundle branch block I44.7    Pure hypercholesterolemia E78.00    Moderate obesity E66.8    Failure to thrive in adult R62.7    Pressure injury of sacral region, stage 3 ([address] L89.153    Decreased dorsalis pedis pulse R09.89    ESRD on hemodialysis ([address] N18.6, Z99.2    Closed bicondylar fracture of distal femur, right, initial encounter ([address] S72.421A, S72.431A    Other fracture of right femur, initial encounter for closed fracture ([address] W91.8F0J       Past Medical History:        Diagnosis Date    Anemia in chronic kidney disease     Anxiety and depression     Arthritis     Chronic pain syndrome     COVID-19 05/2020    COVID-19     Decreased dorsalis pedis pulse 10/25/2022    Diabetes mellitus ([address]     Dysphagia, oral phase     ESRD on hemodialysis ([address] 10/25/2022    GERD (gastroesophageal reflux disease)     Hemodialysis patient ([address]     M-W-F    Hyperkalemia     Hypertension     Hypertensive kidney disease with stage 4 chronic kidney disease (HCC)     Insomnia     Kidney stones     MDD (major depressive disorder)     Moderate protein-calorie malnutrition (HCC)     Morbid obesity (HCC)     Muscle weakness (generalized)     Obesity     Pressure injury of sacral region, stage 3 ([address] 10/25/2022    Pressure ulcer of sacral region     Sacral pressure ulcer 2021    stage 4    Unspecified osteoarthritis, unspecified site     Weakness generalized        Past Surgical History:        Procedure Laterality Date    ABDOMEN SURGERY N/A 2020    SACRAL WOUND DEBRIDEMENT CALL DRWatson WITH TIME AVAIL AM performed by Josh Lu MD at [address]  [address]   [facility]  3/[facility]/16    Laparoscopic-Dr. Gilbert Burrell    CYSTOSCOPY       for kidney stones    DIALYSIS FISTULA CREATION Left 2021    INSERTION FISTULA LEFT ARM performed by Lizett John MD at [address] Right 2021    REVISION AV FISTULA LEFT ARM performed by Lizett John MD at [address]       right     UPPER GASTROINTESTINAL ENDOSCOPY  2.18.15    Dr. Jacky Edmondson Findings: Mild Gastrits and Duodenitis, 2cm Hiatal Hernia    UPPER GASTROINTESTINAL ENDOSCOPY N/A 2020    EGD CONTROL HEMORRHAGE performed by Josh Lu MD at [address] [facility] 2020    EGD BEDSIDE performed by Grecia Meadows MD at [facility]. [address] N/A 2021    INSERTION TUNNELED DIALYSIS CATHETER performed by Lizett John MD at [address] [address] History:    Social History     Tobacco Use    Smoking status: Former     Packs/day: 1.00     Years: 30.00     Pack years: 30.00     Types: Cigarettes     Quit date: 2011     Years since quittin.2    Smokeless tobacco: Never   Substance Use Topics    Alcohol use:  No                                Counseling given: Not Answered      Vital Signs (Current): There were no vitals filed for this visit. BP Readings from Last 3 Encounters:   11/04/22 120/84   11/01/22 110/60   10/25/22 120/70       NPO Status:                                                                                 BMI:   Wt Readings from Last 3 Encounters:   11/04/22 194 lb 0.1 oz (88 kg)   11/01/22 192 lb (87.1 kg)   10/25/22 192 lb (87.1 kg)     There is no height or weight on file to calculate BMI.    CBC:   Lab Results   Component Value Date/Time    WBC 9.1 11/04/2022 09:10 AM    RBC 3.01 11/04/2022 09:10 AM    HGB 10.5 11/04/2022 09:10 AM    HCT 33.4 11/04/2022 09:10 AM    .0 11/04/2022 09:10 AM    RDW 15.4 11/04/2022 09:10 AM     11/04/2022 09:10 AM       CMP:   Lab Results   Component Value Date/Time     11/04/2022 09:10 AM    K 5.4 11/04/2022 09:10 AM    K 6.4 07/08/2022 09:42 PM     11/04/2022 09:10 AM    CO2 26 11/04/2022 09:10 AM    BUN 37 11/04/2022 09:10 AM    CREATININE 6.2 11/04/2022 09:10 AM    GFRAA 7 10/12/2022 03:49 AM    LABGLOM 7 11/04/2022 09:10 AM    GLUCOSE 177 11/04/2022 09:10 AM    GLUCOSE 149 10/12/2011 09:59 AM    PROT 6.4 11/03/2022 10:12 PM    CALCIUM 9.6 11/04/2022 09:10 AM    BILITOT 0.3 11/03/2022 10:12 PM    ALKPHOS 100 11/03/2022 10:12 PM    AST 23 11/03/2022 10:12 PM    ALT 17 11/03/2022 10:12 PM       POC Tests: No results for input(s): POCGLU, POCNA, POCK, POCCL, POCBUN, POCHEMO, POCHCT in the last 72 hours.     Coags:   Lab Results   Component Value Date/Time    PROTIME 10.8 12/05/2021 04:23 PM    PROTIME 15.3 06/25/2011 07:00 PM    INR 1.0 12/05/2021 04:23 PM    APTT 28.3 12/05/2021 04:23 PM       HCG (If Applicable): No results found for: PREGTESTUR, PREGSERUM, HCG, HCGQUANT     ABGs:   Lab Results   Component Value Date/Time    PO2ART 110.4 05/09/2020 04:02 PM    OIK0UWL 43.7 05/09/2020 04:02 PM    NNW6MVC 21.8 05/09/2020 04:02 PM    Y6EIMFHT 97.4 06/27/2011 02:48 AM        Type & Screen (If Applicable):  No results found for: LABABO, LABRH    Drug/Infectious Status (If Applicable):  No results found for: HIV, HEPCAB    COVID-19 Screening (If Applicable):   Lab Results   Component Value Date/Time    COVID19 Not Detected 07/14/2022 09:45 AM    COVID19 Not Detected 05/21/2020 06:39 AM           Anesthesia Evaluation  Patient summary reviewed and Nursing notes reviewed no history of anesthetic complications:   Airway: Mallampati: II  TM distance: >3 FB   Neck ROM: full  Mouth opening: > = 3 FB   Dental:      Comment: Pt states no loose teeth    Pulmonary:       (-) shortness of breath and no decreased breath sounds                          ROS comment: Quit smoking 10 years ago    Probable ALFRED - never had sleep study   Cardiovascular:    (+) hypertension: moderate,     (-)  NAVARRO    ECG reviewed  Rhythm: regular  Rate: normal  Echocardiogram reviewed         Beta Blocker:  Not on Beta Blocker         Neuro/Psych:   (+) psychiatric history:depression/anxiety             GI/Hepatic/Renal:   (+) GERD:, renal disease (on HD (M/W/F) for past couple of months;  done yesterday): ESRD, dialysis and kidney stones,          ROS comment: S/p TDC insertion. Endo/Other:    (+) DiabetesType II DM, using insulin, blood dyscrasia: anemia, arthritis:., .          Pt had no PAT visit        ROS comment: Neurologic gait dysfunction Abdominal:   (+) obese,           Vascular:           ROS comment: Preexisting TDC in place currently. Other Findings:             Anesthesia Plan      general     ASA 4     (Patient agreed to suspend her DNR status during the perioperative period. Agrees to preop nerve block as requested by surgeon.)  Induction: intravenous. MIPS: Postoperative opioids intended, Prophylactic antiemetics administered and Postoperative trial extubation. Anesthetic plan and risks discussed with patient. Use of blood products discussed with patient whom consented to blood products.    Plan discussed with CRNA. Rhonda Micheal MD   11/4/2022         Patient seen and chart reviewed. No interval change in history or exam.   Anesthesia plan discussed, risk/benefits addressed. Patient's concerns and questions answered.      PABLO Neville - CRNA  November 4, 2022  2:39 PM

## 2022-11-04 NOTE — PROGRESS NOTES
Initial Inpatient Wound Care    Admit Date: 11/3/2022  8:16 PM    Reason for consult:  coccyx wound, follows at wound center    Significant history:  adm after tripping on her walker and fall. Fx ESRD on HD, dm 2 with neuropathy, hx COVID    Wound history:  POA, stage 4 coccyx pressure injury follows with [facility],[address], states had had for 4 years    Findings:  alert, requesting pain meds, medicated per nurse  Anxious and weeping  Sling and dressing left arm. Refusing to turn. States coccyx, right leg and left arm all painful  Unable to assess heels due to pain    Interventions in place:  comfort glide    Plan:lo air loss bed ordered. Will follow      Barbara Avila RN 11/4/2022 10:40 AM    Attempted to see after pain meds but was taken to [facility].  ROLAND Avalos, Wound Care

## 2022-11-04 NOTE — CONSULTS
Department of Orthopedic Surgery  [facility] Fe Fontana MD  Consult    Reason for consult:  right femur and left wrist fractures    Consulting Phsyician: Dr Dave Romano:                 The patient is a 67 y.o. female who presents with femur and left distal radius fracture status post fall. Patient reports that she just got out of [employer]. She is living with her son. She tripped on some carpeting and fell onto her right knee and left wrist.  She had immediate pain and swelling. She does have a hemodialysis fistula on her left upper extremity. She presented to the emergency department. She was admitted to the medical service. Consultation was placed to orthopedics for management of her femur and wrist fractures. She is a previous poor ambulator with a walker and estimates her ambulation approximately 50 feet with a walker. She does have underlying osteoarthritis of the right knee. .    Past Medical History:        Diagnosis Date    Anemia in chronic kidney disease     Anxiety and depression     Arthritis     Chronic pain syndrome     COVID-19 05/2020    COVID-19     Decreased dorsalis pedis pulse 10/25/2022    Diabetes mellitus ([address]     Dysphagia, oral phase     ESRD on hemodialysis ([address] 10/25/2022    GERD (gastroesophageal reflux disease)     Hemodialysis patient ([address]     M-W-F    Hyperkalemia     Hypertension     Hypertensive kidney disease with stage 4 chronic kidney disease (HCC)     Insomnia     Kidney stones     MDD (major depressive disorder)     Moderate protein-calorie malnutrition (HCC)     Morbid obesity (HCC)     Muscle weakness (generalized)     Obesity     Pressure injury of sacral region, stage 3 ([address] 10/25/2022    Pressure ulcer of sacral region     Sacral pressure ulcer 08/03/2021    stage 4    Unspecified osteoarthritis, unspecified site     Weakness generalized      Past Surgical History:        Procedure Laterality Date    ABDOMEN SURGERY N/A 5/4/2020    SACRAL Donal Hammond DR.  WITH TIME AVAIL AM performed by Summer Whiting MD at [address]  x3    CHOLECYSTECTOMY  3/31/16    Laparoscopic-Dr. Thu Momin    CYSTOSCOPY  2011     for kidney stones    DIALYSIS FISTULA CREATION Left 8/5/2021    INSERTION FISTULA LEFT ARM performed by Carmelo Tapia MD at [address] Right 11/18/2021    REVISION AV FISTULA LEFT ARM performed by Camrelo Tapia MD at [facility]  2009     right     UPPER GASTROINTESTINAL ENDOSCOPY  2.18.15    Dr. Mary Jane Campos Findings: Mild Gastrits and Duodenitis, 2cm Hiatal Hernia    UPPER GASTROINTESTINAL ENDOSCOPY N/A 5/8/2020    EGD CONTROL HEMORRHAGE performed by Summer Whiting MD at [address] N/A 5/22/2020    EGD BEDSIDE performed by Rivka Segundo MD at [address] N/A 5/6/2021    INSERTION TUNNELED DIALYSIS CATHETER performed by Carmelo Tapia MD at [facility] OR     Current Medications:   Current Facility-Administered Medications: bisacodyl (DULCOLAX) EC tablet 10 mg, 10 mg, Oral, Daily PRN  busPIRone (BUSPAR) tablet 10 mg, 10 mg, Oral, BID  busPIRone (BUSPAR) tablet 5 mg, 5 mg, Oral, Nightly  escitalopram (LEXAPRO) tablet 10 mg, 10 mg, Oral, Daily  insulin glargine (LANTUS) injection vial 6 Units, 6 Units, SubCUTAneous, Nightly  magnesium hydroxide (MILK OF MAGNESIA) 400 MG/5ML suspension 30 mL, 30 mL, Oral, Daily PRN  metoprolol succinate (TOPROL XL) extended release tablet 25 mg, 25 mg, Oral, Daily  midodrine (PROAMATINE) tablet 15 mg, 15 mg, Oral, Once per day on Mon Wed Fri  polyethylene glycol (GLYCOLAX) packet 17 g, 17 g, Oral, Daily  prochlorperazine (COMPAZINE) tablet 5 mg, 5 mg, Oral, Q6H PRN  traZODone (DESYREL) tablet 25 mg, 25 mg, Oral, Nightly PRN  vitamin D (CHOLECALCIFEROL) tablet 2,000 Units, 2,000 Units, Oral, Daily  sevelamer (RENVELA) tablet 800 mg, 800 mg, Oral, TID WC  sodium chloride flush 0.9 % injection 5-40 mL, 5-40 mL, IntraVENous, 2 times per day  sodium chloride flush 0.9 % injection 5-40 mL, 5-40 mL, IntraVENous, PRN  0.9 % sodium chloride infusion, , IntraVENous, PRN  heparin (porcine) injection 5,000 Units, 5,000 Units, SubCUTAneous, 3 times per day  ondansetron (ZOFRAN-ODT) disintegrating tablet 4 mg, 4 mg, Oral, Q8H PRN **OR** ondansetron (ZOFRAN) injection 4 mg, 4 mg, IntraVENous, Q6H PRN  polyethylene glycol (GLYCOLAX) packet 17 g, 17 g, Oral, Daily PRN  acetaminophen (TYLENOL) tablet 650 mg, 650 mg, Oral, Q6H PRN **OR** acetaminophen (TYLENOL) suppository 650 mg, 650 mg, Rectal, Q6H PRN  HYDROmorphone (DILAUDID) injection 1 mg, 1 mg, IntraVENous, Q4H PRN  glucose chewable tablet 16 g, 4 tablet, Oral, PRN  dextrose bolus 10% 125 mL, 125 mL, IntraVENous, PRN **OR** dextrose bolus 10% 250 mL, 250 mL, IntraVENous, PRN  glucagon (rDNA) injection 1 mg, 1 mg, SubCUTAneous, PRN  dextrose 10 % infusion, , IntraVENous, Continuous PRN  insulin lispro (HUMALOG) injection vial 0-8 Units, 0-8 Units, SubCUTAneous, TID WC  insulin lispro (HUMALOG) injection vial 0-4 Units, 0-4 Units, SubCUTAneous, Nightly  gabapentin (NEURONTIN) capsule 100 mg, 100 mg, Oral, TID  sodium chloride flush 0.9 % injection 5-40 mL, 5-40 mL, IntraVENous, 2 times per day  sodium chloride flush 0.9 % injection 5-40 mL, 5-40 mL, IntraVENous, PRN  0.9 % sodium chloride infusion, , IntraVENous, PRN  ceFAZolin (ANCEF) 2,000 mg in sterile water 20 mL IV syringe, 2,000 mg, IntraVENous, On Call to OR  oxyCODONE-acetaminophen (PERCOCET)  MG per tablet 1 tablet, 1 tablet, Oral, Q4H PRN  Allergies:  Patient has no known allergies. Social History:   TOBACCO:   reports that she quit smoking about 11 years ago. She has a 30.00 pack-year smoking history. She has never used smokeless tobacco.  ETOH:   reports no history of alcohol use. DRUGS:   reports no history of drug use.   ACTIVITIES OF DAILY LIVING:    OCCUPATION:    Family History: Problem Relation Age of Onset    Cancer Sister        REVIEW OF SYSTEMS:  CONSTITUTIONAL:  negative  EYES:  negative  HEENT:  negative  RESPIRATORY:  negative  CARDIOVASCULAR:  HTN  GASTROINTESTINAL: GERD  GENITOURINARY:  ESRD  INTEGUMENT/BREAST:  negative  HEMATOLOGIC/LYMPHATIC:  negative  ALLERGIC/IMMUNOLOGIC:  negative  ENDOCRINE:  DM  MUSCULOSKELETAL:  right knee and left wrist pain  NEUROLOGICAL:  negative  BEHAVIOR/PSYCH:  negative    PHYSICAL EXAM:    VITALS:  /84   Pulse 69   Temp 98.5 °F (36.9 °C)   Resp 18   Ht 5' 1\" (1.549 m)   Wt 194 lb 0.1 oz (88 kg)   SpO2 100%   BMI 36.66 kg/m²   CONSTITUTIONAL:  awake, alert, cooperative, no apparent distress, and appears stated age  EYES:  Lids and lashes normal, pupils equal, round and reactive to light, extra ocular muscles intact, sclera clear, conjunctiva normal  ENT:  Normocephalic, without obvious abnormality, atraumatic, sinuses nontender on palpation, external ears without lesions, oral pharynx with moist mucus membranes, tonsils without erythema or exudates, gums normal and good dentition. NECK:  Supple, symmetrical, trachea midline, no adenopathy, thyroid symmetric, not enlarged and no tenderness, skin normal  NEUROLOGIC:  Awake, alert, oriented to name, place and time. Cranial nerves II-XII are grossly intact. Motor is 5 out of 5 bilaterally. Sensory is intact. MUSCULOSKELETAL:    Left upper extremity: Wrist cap refill to fingers. Splint intact. Able to flex extend the digits. Radial, ulnar, median nerves are grossly intact light touch. Right lower extremity: Leg shortened and externally rotated. Exquisitely tender to palpation. Able to flex and extend the toes and ankle. L2-S1 sensation intact to light touch. There is brisk cap refill of the toes. Palpable dorsalis pedis pulse.     DATA:    CBC:   Lab Results   Component Value Date/Time    WBC 9.1 11/04/2022 09:10 AM    RBC 3.01 11/04/2022 09:10 AM    HGB 10.5 11/04/2022 09:10 AM    HCT 33.4 11/04/2022 09:10 AM    .0 11/04/2022 09:10 AM    MCH 34.9 11/04/2022 09:10 AM    MCHC 31.4 11/04/2022 09:10 AM    RDW 15.4 11/04/2022 09:10 AM     11/04/2022 09:10 AM    MPV 10.7 11/04/2022 09:10 AM     PT/INR:    Lab Results   Component Value Date/Time    PROTIME 10.8 12/05/2021 04:23 PM    PROTIME 15.3 06/25/2011 07:00 PM    INR 1.0 12/05/2021 04:23 PM       Radiology Review: X-rays of the right femur, left hand, left elbow, left shoulder, and CT scan of the right knee were reviewed. Imaging demonstrates a comminuted distal femur fracture. No intra-articular split appreciated. Advanced degenerative changes present about the knee. There is an associated left distal radius and ulna fractures. IMPRESSION:  Right distal femur fracture  Left distal radius fracture  DM  CKD on dialysis  HTN  Obesity  Sacral Ulcer stage 4  COVID19 history    PLAN:  I have explained today's findings with the patient. Treatment options were explained. Given severity of injury recommended open reduction fixation of the right femur. Plan is for retrograde nail with sideplate versus ORIF of the distal femur with plates and screws. She voiced understanding. Anticipate 2 to 3 months of healing of the distal femur fracture. In regards to the left distal radius plan for open reduction fixation with volar plating. She does have a fistula on the fracture side and this may increase the complexity of the fixation.       I explained the risks, benefits, alternatives and complications of surgery with the patient including but not limited to the risks of death, possible damage to nerves, vessels, or tendons, possible infection, possible nonunion, possible malunion, leg length discrepancy and malrotation,  Deep Venous Thrombosis (DVT), Pulmonary Emboli (PE), post-operative DVT prophylaxis, possible hardware failure, possible need for hardware removal, stiffness, as well as the possible need further surgery and unanticipated complications. The patient voiced understanding and all questions were answered. Patient understands that she is at increased risk for perioperative morbidity and mortality given her extensive medical comorbidities. The patient elected to proceed with surgical intervention.      Adriana Morales MD  11/4/2022

## 2022-11-04 NOTE — PROGRESS NOTES
P Quality Flow/Interdisciplinary Rounds Progress Note        Quality Flow Rounds held on November 4, 2022    Disciplines Attending:  Bedside Nurse, , , and Nursing Unit Leadership    Francine Carpenter was admitted on 11/3/2022  8:16 PM    Anticipated Discharge Date:  Expected Discharge Date: 11/08/22    Disposition:    Herbert Score:  Herbert Scale Score: 17    Readmission Risk              Risk of Unplanned Readmission:  30           Discussed patient goal for the day, patient clinical progression, and barriers to discharge.   The following Goal(s) of the Day/Commitment(s) have been identified:   Dialysis and ORIF  today, pain control      Manas Holm RN  November 4, 2022

## 2022-11-05 LAB
METER GLUCOSE: 202 MG/DL (ref 74–99)
METER GLUCOSE: 206 MG/DL (ref 74–99)
METER GLUCOSE: 215 MG/DL (ref 74–99)
METER GLUCOSE: 244 MG/DL (ref 74–99)

## 2022-11-05 PROCEDURE — 0PSJ04Z REPOSITION LEFT RADIUS WITH INTERNAL FIXATION DEVICE, OPEN APPROACH: ICD-10-PCS | Performed by: ORTHOPAEDIC SURGERY

## 2022-11-05 PROCEDURE — 82962 GLUCOSE BLOOD TEST: CPT

## 2022-11-05 PROCEDURE — 2580000003 HC RX 258: Performed by: PHYSICIAN ASSISTANT

## 2022-11-05 PROCEDURE — 1200000000 HC SEMI PRIVATE

## 2022-11-05 PROCEDURE — 6370000000 HC RX 637 (ALT 250 FOR IP): Performed by: PHYSICIAN ASSISTANT

## 2022-11-05 PROCEDURE — 0QSB04Z REPOSITION RIGHT LOWER FEMUR WITH INTERNAL FIXATION DEVICE, OPEN APPROACH: ICD-10-PCS | Performed by: ORTHOPAEDIC SURGERY

## 2022-11-05 PROCEDURE — 6360000002 HC RX W HCPCS: Performed by: PHYSICIAN ASSISTANT

## 2022-11-05 RX ADMIN — ESCITALOPRAM OXALATE 10 MG: 10 TABLET ORAL at 09:40

## 2022-11-05 RX ADMIN — HYDROMORPHONE HYDROCHLORIDE 0.5 MG: 0.5 INJECTION, SOLUTION INTRAMUSCULAR; INTRAVENOUS; SUBCUTANEOUS at 13:59

## 2022-11-05 RX ADMIN — GABAPENTIN 100 MG: 100 CAPSULE ORAL at 20:24

## 2022-11-05 RX ADMIN — INSULIN LISPRO 2 UNITS: 100 INJECTION, SOLUTION INTRAVENOUS; SUBCUTANEOUS at 17:19

## 2022-11-05 RX ADMIN — HYDROMORPHONE HYDROCHLORIDE 0.5 MG: 0.5 INJECTION, SOLUTION INTRAMUSCULAR; INTRAVENOUS; SUBCUTANEOUS at 20:27

## 2022-11-05 RX ADMIN — GABAPENTIN 100 MG: 100 CAPSULE ORAL at 13:53

## 2022-11-05 RX ADMIN — SODIUM CHLORIDE, PRESERVATIVE FREE 10 ML: 5 INJECTION INTRAVENOUS at 13:58

## 2022-11-05 RX ADMIN — INSULIN LISPRO 2 UNITS: 100 INJECTION, SOLUTION INTRAVENOUS; SUBCUTANEOUS at 06:28

## 2022-11-05 RX ADMIN — Medication 10 ML: at 20:26

## 2022-11-05 RX ADMIN — BUSPIRONE HYDROCHLORIDE 10 MG: 10 TABLET ORAL at 09:40

## 2022-11-05 RX ADMIN — CEFAZOLIN 1000 MG: 1 INJECTION, POWDER, FOR SOLUTION INTRAMUSCULAR; INTRAVENOUS at 01:11

## 2022-11-05 RX ADMIN — SEVELAMER CARBONATE 800 MG: 800 TABLET, FILM COATED ORAL at 12:10

## 2022-11-05 RX ADMIN — SEVELAMER CARBONATE 800 MG: 800 TABLET, FILM COATED ORAL at 17:18

## 2022-11-05 RX ADMIN — HYDROMORPHONE HYDROCHLORIDE 0.5 MG: 0.5 INJECTION, SOLUTION INTRAMUSCULAR; INTRAVENOUS; SUBCUTANEOUS at 07:59

## 2022-11-05 RX ADMIN — Medication 2000 UNITS: at 09:40

## 2022-11-05 RX ADMIN — HEPARIN SODIUM 5000 UNITS: 10000 INJECTION INTRAVENOUS; SUBCUTANEOUS at 14:05

## 2022-11-05 RX ADMIN — GABAPENTIN 100 MG: 100 CAPSULE ORAL at 09:41

## 2022-11-05 RX ADMIN — BUSPIRONE HYDROCHLORIDE 10 MG: 10 TABLET ORAL at 20:25

## 2022-11-05 RX ADMIN — METOPROLOL SUCCINATE 25 MG: 25 TABLET, FILM COATED, EXTENDED RELEASE ORAL at 09:40

## 2022-11-05 RX ADMIN — HEPARIN SODIUM 5000 UNITS: 10000 INJECTION INTRAVENOUS; SUBCUTANEOUS at 20:29

## 2022-11-05 RX ADMIN — OXYCODONE AND ACETAMINOPHEN 2 TABLET: 5; 325 TABLET ORAL at 01:11

## 2022-11-05 RX ADMIN — SEVELAMER CARBONATE 800 MG: 800 TABLET, FILM COATED ORAL at 09:40

## 2022-11-05 RX ADMIN — Medication 10 ML: at 09:41

## 2022-11-05 RX ADMIN — CEFAZOLIN 1000 MG: 1 INJECTION, POWDER, FOR SOLUTION INTRAMUSCULAR; INTRAVENOUS at 09:41

## 2022-11-05 RX ADMIN — INSULIN LISPRO 2 UNITS: 100 INJECTION, SOLUTION INTRAVENOUS; SUBCUTANEOUS at 10:42

## 2022-11-05 RX ADMIN — INSULIN GLARGINE 6 UNITS: 100 INJECTION, SOLUTION SUBCUTANEOUS at 20:28

## 2022-11-05 RX ADMIN — HEPARIN SODIUM 5000 UNITS: 10000 INJECTION INTRAVENOUS; SUBCUTANEOUS at 05:32

## 2022-11-05 RX ADMIN — BISACODYL 10 MG: 5 TABLET, COATED ORAL at 05:32

## 2022-11-05 RX ADMIN — OXYCODONE AND ACETAMINOPHEN 2 TABLET: 5; 325 TABLET ORAL at 05:31

## 2022-11-05 RX ADMIN — POLYETHYLENE GLYCOL 3350 17 G: 17 POWDER, FOR SOLUTION ORAL at 09:40

## 2022-11-05 ASSESSMENT — PAIN DESCRIPTION - PAIN TYPE
TYPE: SURGICAL PAIN
TYPE: SURGICAL PAIN;ACUTE PAIN

## 2022-11-05 ASSESSMENT — PAIN DESCRIPTION - ONSET: ONSET: ON-GOING

## 2022-11-05 ASSESSMENT — PAIN - FUNCTIONAL ASSESSMENT
PAIN_FUNCTIONAL_ASSESSMENT: PREVENTS OR INTERFERES SOME ACTIVE ACTIVITIES AND ADLS
PAIN_FUNCTIONAL_ASSESSMENT: PREVENTS OR INTERFERES WITH MANY ACTIVE NOT PASSIVE ACTIVITIES
PAIN_FUNCTIONAL_ASSESSMENT: ACTIVITIES ARE NOT PREVENTED

## 2022-11-05 ASSESSMENT — PAIN DESCRIPTION - LOCATION
LOCATION: ANKLE;LEG
LOCATION: HIP;BACK;LEG
LOCATION: KNEE
LOCATION: WRIST

## 2022-11-05 ASSESSMENT — PAIN DESCRIPTION - FREQUENCY: FREQUENCY: CONTINUOUS

## 2022-11-05 ASSESSMENT — PAIN SCALES - GENERAL
PAINLEVEL_OUTOF10: 0
PAINLEVEL_OUTOF10: 7
PAINLEVEL_OUTOF10: 8
PAINLEVEL_OUTOF10: 8
PAINLEVEL_OUTOF10: 4

## 2022-11-05 ASSESSMENT — PAIN DESCRIPTION - DESCRIPTORS
DESCRIPTORS: ACHING;DISCOMFORT;SORE
DESCRIPTORS: SHARP;CRAMPING
DESCRIPTORS: PRESSURE

## 2022-11-05 ASSESSMENT — PAIN SCALES - WONG BAKER: WONGBAKER_NUMERICALRESPONSE: 0

## 2022-11-05 ASSESSMENT — PAIN DESCRIPTION - ORIENTATION
ORIENTATION: LEFT
ORIENTATION: RIGHT
ORIENTATION: RIGHT

## 2022-11-05 NOTE — OP NOTE
[address] [address]                                OPERATIVE REPORT    PATIENT NAME: Aixa Albrecht                      :        1950  MED REC NO:   [id number]                            ROOM:       0603  ACCOUNT NO:   [de-identified]                           ADMIT DATE: 2022  PROVIDER:     Molly Bolaños MD    DATE OF PROCEDURE:  2022    PREOPERATIVE DIAGNOSES:  1. Right comminuted distal metaphyseal osteoporotic pathologic femur  fracture. 2.  Left distal radius fracture. POSTOPERATIVE DIAGNOSES:  1. Right comminuted distal metaphyseal osteoporotic pathologic femur  fracture. 2.  Left distal radius fracture. PROCEDURE PERFORMED:  1. Right distal femur fracture open reduction and internal fixation  using a Synthes retrograde femoral nail augmented with a lateral locking  plate, size 12 mm diameter x 360 mm in length. 2.  Left distal radius fracture open reduction and internal fixation  with volar plate fixation, Synthes plate and screws. ANESTHESIA:  1. General.  2.  Local anesthetic by surgeon consisting of approximately 10 mL of 1%  lidocaine with epinephrine. ASSISTANT:  Peewee Zavala physician assistant certified. She was  present throughout the procedure. Her assistance was used for  preoperative positioning, intraoperative retraction, closure, and  dressing application. Her assistance expedited the case and decreased  the surgical time. An orthopedic surgery resident was unavailable for  case coverage at the time of surgery. TOURNIQUET TIME:  No tourniquet was used during the procedure. BLOOD LOSS:  Approximately 100 mL. COMPLICATIONS:  None. DISPOSITION:  The patient was stable throughout the procedure.     DESCRIPTION OF PROCEDURE:  The patient is a 27-year-old female who  sustained a ground-level fall sustaining a comminuted right distal femur  fracture and a left distal radius fracture. She is admitted to the  Medical Service. The risks, benefits, alternatives, and complications  of her injuries were explained, including but not limited to the risk of  infection; damage to nerves, vessels, or tendons; malunion; nonunion;  leg length discrepancy; malrotation; risk of deep venous thrombosis;  pulmonary emboli; symptomatic hardware. Increased risk of perioperative  morbidity and mortality given her medical comorbidities and unforeseen  complications. She understood and wished to proceed. SURGERY IN DETAIL:  The patient was identified in the holding area. The  right knee and left wrist were identified as the surgical sites. She  was then seen by Anesthesia, taken to the operating room, placed supine  on the table, and underwent general anesthesia per Anesthesia  Department. All bony prominences were well padded. The right lower  extremity and left upper extremity were then prepped and draped in the  standard sterile fashion. Fluoroscopy was then brought in, which confirmed a comminuted fracture  pattern. An incision was made, inferior pole of the patella, extended to the  tibial tubercle followed by blunt dissection and identification of the  patellar tendon. Splitting patellar tendon incision was then made. Guide pin was placed under fluoroscopy, center-center in the distal  segment. This was overdrilled. This was followed by a long guide brien. Length of the nail was estimated at 360 mm in length. Reaming was up to  a 13.5 followed by insertion of a 12-mm x 360-mm length nail. This was  passed up the fracture in femur and had a good positioning proximally. Two oblique Crosslock screws were then drilled through small  percutaneous incisions maintaining the overall alignment.     The lateral side plate was then placed through a lateral incision to  allow the lateral side plate to be placed down through the locking jig  of the nail and secured in place and had good alignment in both AP and  lateral planes. These two screws were drilled, measured, and inserted  capturing the plate to the nail and locked into position followed by  multiple locking screws placed oblique angles and measured and locked  into position providing excellent fixation alignment of the distal  segment. AP and lateral views with the guide jig off confirmed  positioning of the fracture, nail, and alignment. Using a _____ technique proximally and anterior and posterior,  derotational screw was placed with the fracture in good alignment and  maintaining neutrality of the lower segment. With this confirmed, the  length of the screw was measured and inserted. AP and lateral views  confirmed positioning of the Crosslock proximally. All wounds were  thoroughly and copiously irrigated out. Deep tissue was closed with 0  Vicryl suture closing the tensor fascia bogdan and patellar tendon  followed by 2-0 Vicryl and skin staples. Attention directed towards the left upper extremity. She had a fistula  on this arm. No tourniquet was used. A FCR approach was then  undertaken excising the skin at the level of the volar wrist flexion  crease extending proximally. Hemostasis achieved with electrocautery. FCR tendon was mobilized. Subsheath incised. Blunt dissection carried  down to the fracture. Fracture was copiously irrigated out. Fracture  was reduced, preliminarily held with a 0.062 K-wire. A Synthes volar  locked plate was then used and positioned distally under fluoroscopic  imaging for alignment at the watershed line. This was confirmed under  fluoroscopy. A nonlocking screw was lagged to plate to the bone  distally followed by drilling, measuring of appropriate locking screws  followed by exchange of the nonlocking screw for locking screw, which  was remeasured. The plate was then brought down proximally.   Three  proximal holes were drilled and inserted and had a good fixation. AP,  lateral, and radial tilt views confirmed near anatomical alignment to  the distal radius fracture. All screws were extraarticular in placement  and deemed appropriate in length and there was a good stability to the  DRUJ. The wound was thoroughly and copiously irrigated out. Skin  closed with nylon suture. Sterile dressing and volar splint were  applied. The patient remained stable throughout the procedure.         Myron Burr MD    D: 11/04/2022 18:40:26       T: 11/04/2022 18:43:13     [email or web address]  Job#: [id number]     Doc#: [id number]    CC:

## 2022-11-05 NOTE — PROGRESS NOTES
Comprehensive Nutrition Assessment    Type and Reason for Visit:  Initial, Consult, Wound    Nutrition Recommendations/Plan:   Recommend Low K+/Carb Controlled Diet. Will Start ONS and monitor. Malnutrition Assessment:  Malnutrition Status:  No malnutrition (11/05/22 1431)    Context:  Acute Illness     Findings of the 6 clinical characteristics of malnutrition:  Energy Intake:  No significant decrease in energy intake  Weight Loss:  No significant weight loss     Body Fat Loss:  No significant body fat loss     Muscle Mass Loss:  No significant muscle mass loss    Fluid Accumulation:  No significant fluid accumulation     Strength:  Not Performed    Nutrition Assessment:    Pt adm w/ RLE/LUE pain s/p GLF x ~1d pta. PMHx DM, ESRD/HD, MDD, Dysphagia, Chronic Coccyx Wound, FTT (7/8); Adm w/ Rt Femur and Lt Radius/Ulna Frx, s/p ORIF 11/4. Pt at nutritional risk d/t increased needs for wound healing w/ ESRD/HD losses. Will Start ONS and monitor. Nutrition Related Findings:    A&O, edentulous, Abd/BS WDL, no edema, -I/O's, +HD Wound Type: Multiple, Surgical Incision, Pressure Injury, Stage IV       Current Nutrition Intake & Therapies:    Average Meal Intake: 26-50%  Average Supplements Intake: None Ordered  ADULT DIET; Regular    Anthropometric Measures:  Height: 5' 1\" (154.9 cm)  Ideal Body Weight (IBW): 105 lbs (48 kg)    Admission Body Weight: 194 lb (88 kg) (bed 11/4)  Current Body Weight: 197 lb (89.4 kg) (actual 11/5),   IBW.  Weight Source: Bed Scale  Current BMI (kg/m2): 37.2  Usual Body Weight: 203 lb (92.1 kg) (bed 12/6/21 per EMR; noted wt fluctuations pta likely 2/2 fluid shifts w/ ESRD/HD)  % Weight Change (Calculated): -3  Weight Adjustment For: No Adjustment                 BMI Categories: Obese Class 2 (BMI 35.0 -39.9)    Estimated Daily Nutrient Needs:  Energy Requirements Based On: Formula  Weight Used for Energy Requirements: Admission  Energy (kcal/day): [id number] per Adm Wt=   Weight Used for Protein Requirements: Ideal  Protein (g/day): 1.5-1.8gm/kg IBW= 70-85  Method Used for Fluid Requirements: Standard Renal  Fluid (ml/day): per renal mgmt    Nutrition Diagnosis:   Increased nutrient needs related to increase demand for energy/nutrients as evidenced by wounds, dialysis    Nutrition Interventions:   Food and/or Nutrient Delivery: Modify Current Diet, Start Oral Nutrition Supplement (Recommend Low K+/Carb Controlled Diet. Will Start ONS and monitor.)  Nutrition Education/Counseling: Education not indicated  Coordination of Nutrition Care: Continue to monitor while inpatient       Goals:     Goals: PO intake 75% or greater, by next RD assessment       Nutrition Monitoring and Evaluation:   Behavioral-Environmental Outcomes: None Identified  Food/Nutrient Intake Outcomes: Food and Nutrient Intake, Supplement Intake  Physical Signs/Symptoms Outcomes: Biochemical Data, Chewing or Swallowing, GI Status, Fluid Status or Edema, Nutrition Focused Physical Findings, Skin, Weight    Discharge Planning:     Too soon to determine     Fox Martinez RD, LD  Contact: ext [phone]

## 2022-11-05 NOTE — PROGRESS NOTES
Subjective: The patient is awake and alert. No problems overnight. Denies chest pain, angina, and dyspnea. Denies abdominal pain. Tolerating diet. No nausea or vomiting. Objective:    /80   Pulse 80   Temp 98.1 °F (36.7 °C)   Resp 16   Ht 5' 1\" (1.549 m)   Wt 197 lb 8 oz (89.6 kg)   SpO2 100%   BMI 37.32 kg/m²     Heart:  RRR, no murmurs, gallops, or rubs.   Lungs:  CTA bilaterally, no wheeze, rales or rhonchi  Abd: bowel sounds present, nontender, nondistended, no masses  Extrem:  No clubbing, cyanosis, or edema    CBC with Differential:    Lab Results   Component Value Date/Time    WBC 9.1 11/04/2022 09:10 AM    RBC 3.01 11/04/2022 09:10 AM    HGB 10.5 11/04/2022 09:10 AM    HCT 33.4 11/04/2022 09:10 AM     11/04/2022 09:10 AM    .0 11/04/2022 09:10 AM    MCH 34.9 11/04/2022 09:10 AM    MCHC 31.4 11/04/2022 09:10 AM    RDW 15.4 11/04/2022 09:10 AM    NRBC 0.9 05/25/2020 02:00 PM    SEGSPCT 71 08/16/2013 05:00 AM    BANDSPCT 1 03/29/2016 07:37 PM    METASPCT 0.9 05/10/2020 03:45 AM    LYMPHOPCT 4.8 11/03/2022 10:12 PM    PROMYELOPCT 0.9 05/09/2020 04:15 AM    MONOPCT 7.5 11/03/2022 10:12 PM    MYELOPCT 1.7 05/11/2020 04:45 AM    BASOPCT 0.2 11/03/2022 10:12 PM    MONOSABS 0.97 11/03/2022 10:12 PM    LYMPHSABS 0.63 11/03/2022 10:12 PM    EOSABS 0.02 11/03/2022 10:12 PM    BASOSABS 0.03 11/03/2022 10:12 PM     CMP:    Lab Results   Component Value Date/Time     11/04/2022 09:10 AM    K 5.4 11/04/2022 09:10 AM    K 6.4 07/08/2022 09:42 PM     11/04/2022 09:10 AM    CO2 26 11/04/2022 09:10 AM    BUN 37 11/04/2022 09:10 AM    CREATININE 6.2 11/04/2022 09:10 AM    GFRAA 7 10/12/2022 03:49 AM    LABGLOM 7 11/04/2022 09:10 AM    GLUCOSE 177 11/04/2022 09:10 AM    GLUCOSE 149 10/12/2011 09:59 AM    PROT 6.4 11/03/2022 10:12 PM    LABALBU 3.6 11/03/2022 10:12 PM    LABALBU 4.1 10/12/2011 09:59 AM    CALCIUM 9.6 11/04/2022 09:10 AM    BILITOT 0.3 11/03/2022 10:12 PM    ALKPHOS 100 11/03/2022 10:12 PM    AST 23 11/03/2022 10:12 PM    ALT 17 11/03/2022 10:12 PM        Assessment:    Patient Active Problem List   Diagnosis    Neurologic gait dysfunction    Diabetes mellitus ([address]    Hypertension    Arthritis    Knee problem    Anemia due to stage 3 chronic kidney disease    Primary osteoarthritis of both knees    Moderate protein-calorie malnutrition (HCC)    Pressure injury of sacral region, stage 4 (HCC)    Pressure injury of right hip, stage 3 (HCC)    Left bundle branch block    Pure hypercholesterolemia    Moderate obesity    Failure to thrive in adult    Pressure injury of sacral region, stage 3 (HCC)    Decreased dorsalis pedis pulse    ESRD on hemodialysis ([address]    Closed bicondylar fracture of distal femur, right, initial encounter ([address]    Other fracture of right femur, initial encounter for closed fracture ([address]    Closed fracture of distal ends of left radius and ulna    Closed bicondylar fracture of distal end of right femur ([address]       Plan:  D/C planning        Khloe Casey MD  6:47 AM  11/5/2022

## 2022-11-05 NOTE — PROGRESS NOTES
Department of Internal Medicine  Nephrology Attending Progress Note    SUBJECTIVE:  We are following this patient for end-stage renal failure . 11/5/22-patient awake and alert. Cast on left wrist and right ankle post fall. Completed her dialysis treatment yesterday.     PROBLEM LIST:    Patient Active Problem List   Diagnosis    Neurologic gait dysfunction    Diabetes mellitus ([address]    Hypertension    Arthritis    Knee problem    Anemia due to stage 3 chronic kidney disease    Primary osteoarthritis of both knees    Moderate protein-calorie malnutrition (HCC)    Pressure injury of sacral region, stage 4 (HCC)    Pressure injury of right hip, stage 3 (HCC)    Left bundle branch block    Pure hypercholesterolemia    Moderate obesity    Failure to thrive in adult    Pressure injury of sacral region, stage 3 (HCC)    Decreased dorsalis pedis pulse    ESRD on hemodialysis ([address]    Closed bicondylar fracture of distal femur, right, initial encounter ([address]    Other fracture of right femur, initial encounter for closed fracture ([address]    Closed fracture of distal ends of left radius and ulna    Closed bicondylar fracture of distal end of right femur ([address]        PAST MEDICAL HISTORY:    Past Medical History:   Diagnosis Date    Anemia in chronic kidney disease     Anxiety and depression     Arthritis     Chronic pain syndrome     COVID-19 05/2020    COVID-19     Decreased dorsalis pedis pulse 10/25/2022    Diabetes mellitus ([address]     Dysphagia, oral phase     ESRD on hemodialysis ([address] 10/25/2022    GERD (gastroesophageal reflux disease)     Hemodialysis patient ([address]     M-W-F    Hyperkalemia     Hypertension     Hypertensive kidney disease with stage 4 chronic kidney disease (HCC)     Insomnia     Kidney stones     MDD (major depressive disorder)     Moderate protein-calorie malnutrition (HCC)     Morbid obesity ([address]     Muscle weakness (generalized)     Obesity     Pressure injury of sacral region, stage 3 ([address] 10/25/2022    Pressure ulcer of sacral region     Sacral pressure ulcer 08/03/2021    stage 4    Unspecified osteoarthritis, unspecified site     Weakness generalized        MEDS (scheduled):   busPIRone  10 mg Oral BID    escitalopram  10 mg Oral Daily    insulin glargine  6 Units SubCUTAneous Nightly    metoprolol succinate  25 mg Oral Daily    midodrine  15 mg Oral Once per day on Mon Wed Fri    polyethylene glycol  17 g Oral Daily    vitamin D  2,000 Units Oral Daily    sevelamer  800 mg Oral TID WC    sodium chloride flush  5-40 mL IntraVENous 2 times per day    heparin (porcine)  5,000 Units SubCUTAneous 3 times per day    insulin lispro  0-8 Units SubCUTAneous TID WC    insulin lispro  0-4 Units SubCUTAneous Nightly    gabapentin  100 mg Oral TID    ceFAZolin  1,000 mg IntraVENous Q8H       MEDS (infusions):   sodium chloride      dextrose         MEDS (prn):  bisacodyl, magnesium hydroxide, prochlorperazine, traZODone, sodium chloride flush, sodium chloride, ondansetron **OR** ondansetron, polyethylene glycol, acetaminophen **OR** acetaminophen, glucose, dextrose bolus **OR** dextrose bolus, glucagon (rDNA), dextrose, oxyCODONE-acetaminophen **OR** oxyCODONE-acetaminophen, HYDROmorphone **OR** HYDROmorphone    DIET:    ADULT DIET;  Regular      PHYSICAL EXAM:      Patient Vitals for the past 24 hrs:   BP Temp Temp src Pulse Resp SpO2 Weight   11/05/22 0759 -- -- -- -- 16 -- --   11/05/22 0700 (!) 115/29 98.2 °F (36.8 °C) -- 77 -- 96 % --   11/05/22 0531 124/80 98.1 °F (36.7 °C) Oral 80 16 100 % --   11/05/22 0200 -- -- -- -- -- -- 197 lb 8 oz (89.6 kg)   11/04/22 2352 -- -- -- -- 16 -- --   11/04/22 2330 -- -- -- -- -- 98 % --   11/04/22 2326 130/68 97.9 °F (36.6 °C) Axillary 83 18 100 % --   11/04/22 2142 -- -- -- -- 18 -- --   11/04/22 1952 134/64 97.5 °F (36.4 °C) Axillary 90 18 100 % --   11/04/22 1930 (!) 106/59 -- -- 92 18 100 % --   11/04/22 1915 118/62 97.6 °F (36.4 °C) Temporal 91 18 100 % -- 11/04/22 1900 128/80 -- -- 92 18 100 % --   11/04/22 1847 (!) 169/75 (!) 96 °F (35.6 °C) Temporal (!) 106 20 100 % --   11/04/22 1335 120/84 98.5 °F (36.9 °C) -- 69 18 -- 194 lb 0.1 oz (88 kg)   11/04/22 1305 (!) 112/41 -- -- (!) 115 -- -- --   11/04/22 1230 (!) 107/42 -- -- (!) 111 -- -- --   11/04/22 1200 (!) 132/39 -- -- (!) 103 -- -- --   11/04/22 1130 127/61 -- -- (!) 101 -- -- --   11/04/22 1100 (!) 130/50 -- -- 92 -- -- --   11/04/22 1045 (!) 125/98 -- -- 89 -- -- --   11/04/22 1015 (!) 124/58 98.5 °F (36.9 °C) -- 92 18 -- 194 lb 7.1 oz (88.2 kg)            Intake/Output Summary (Last 24 hours) at 11/5/2022 0833  Last data filed at 11/4/2022 1842  Gross per 24 hour   Intake 600 ml   Output 1700 ml   Net -1100 ml         Wt Readings from Last 3 Encounters:   11/05/22 197 lb 8 oz (89.6 kg)   11/01/22 192 lb (87.1 kg)   10/25/22 192 lb (87.1 kg)       Constitutional:  Patient in no acute distress  Head: normocephalic, atraumatic  Cardiovascular: S1-S2 no S3 or rub  Respiratory:  Clear upper diminished in the bases  Gastrointestinal: soft, nontender, nondistended  Ext: Right ankle with cast left trace edema  Neuro: Awake alert and oriented  Skin: dry, no rash      DATA:      Recent Labs     11/03/22 2212 11/04/22  0910   WBC 13.0* 9.1   HGB 11.1* 10.5*   HCT 35.3 33.4*   .7* 111.0*    205       Recent Labs     11/03/22 2212 11/04/22  0910    141   K 4.7 5.4*   CL 97* 100   CO2 27 26   BUN 34* 37*   CREATININE 5.4* 6.2*   LABGLOM 8 7   GLUCOSE 242* 177*   CALCIUM 9.7 9.6       Recent Labs     11/03/22 2212   ALT 17       Lab Results   Component Value Date    LABALBU 3.6 11/03/2022    LABALBU 3.0 (L) 10/12/2022    LABALBU 3.2 (L) 09/14/2022       Iron studies:  Lab Results   Component Value Date    FERRITIN 767 05/05/2021    IRON 74 05/05/2021    TIBC 116 (L) 05/05/2021     Vitamin B-12   Date Value Ref Range Status   05/05/2021 895 211 - 946 pg/mL Final     Folate   Date Value Ref Range Status   05/05/2021 >20.0 4.8 - 24.2 ng/mL Final       Bone disease:  Lab Results   Component Value Date    MG 2.5 07/14/2022    PHOS 6.0 (H) 07/14/2022     Vit D, 25-Hydroxy   Date Value Ref Range Status   07/15/2022 45 30 - 100 ng/mL Final     Comment:     <20 ng/mL. ........... Gael Mering Deficient  20-30 ng/mL. ......... Gael Mering Insufficient   ng/mL. ........ Gael Mering Sufficient  >100 ng/mL. .......... Gael Mering Toxic       PTH   Date Value Ref Range Status   05/05/2021 86 (H) 15 - 65 pg/mL Final       No components found for: URIC    Lab Results   Component Value Date/Time    COLORU Yellow 07/08/2022 11:04 PM    NITRU Negative 07/08/2022 11:04 PM    GLUCOSEU 100 07/08/2022 11:04 PM    GLUCOSEU NEGATIVE 08/17/2011 10:30 PM    KETUA Negative 07/08/2022 11:04 PM    UROBILINOGEN 0.2 07/08/2022 11:04 PM    BILIRUBINUR Negative 07/08/2022 11:04 PM    BILIRUBINUR NEGATIVE 08/17/2011 10:30 PM       No results found for: OSPODSWW        IMPRESSION/RECOMMENDATIONS:      ESRD-   Chronic IHD at [address] [place] [address]  She received her treatment yesterday with a net UF of 1.6 L    2. Hyperkalemia-  Mild prior to HD. Check in the am.    3. Hypertension with CKD 5-  Intermittent hypotension with HD  Chronically on midodrine with HD treatments. 4. Anemia of CKD-  Follow post op  Adjust ARUN as needed    5. Hyperparathyroidism of renal origin -  Will check phosphorus and PTH in the a.m. Manteo Channel, APRN - [facility]  Patient seen and examined. No family member is present at the bedside. Chart reviewed. I had a face to face encounter with the patient. Agree with exam.    Agree with  formulation, assessment and plan as outlined above and directed by me. Addition and corrections were done as deemed appropriate. My exam and plan include:     Continue current treatment as outlined above. Patient rather stable from a renal standpoint. Continue dialytic support.              Yamileth Condon  MD  Nephrology        Electronically signed by Yamileth Condon MD on 11/5/2022 at 3:24 PM

## 2022-11-05 NOTE — CARE COORDINATION
Social Work/Discharge Planning:  Social Work consult noted. Met with patient and completed initial assessment. Explained Social Work role and discussed transition of care/discharge planning. Patient states she discharged from [facility] about two to three weeks ago and lives with her son. She states she lives with her son in a two story house with a stair lift to second floor. She fell at home and states she will need rehab. She prefers [facility]. Patient has dialysis every Monday, Wednesday and Friday at [facility] in [facility] (ph: [phone]). Informed patient referral to be made Monday 11/7 to [facility] but to review snf list for additional choices. Provided patient with snf choice list.  Patient will need a therapy evaluation when appropriate. Notified charge nurse and to confirm with Physician. Will continue to follow and assist with discharge planning.   Electronically signed by SHANTA Correia on 11/5/2022 at 9:37 AM

## 2022-11-05 NOTE — PROGRESS NOTES
Department of Orthopedic Surgery  Ko Norton MD      Subjective:  Pt has little complaints today. Pain controlled    Vitals  VITALS:  BP (!) 115/29   Pulse 77   Temp 98.2 °F (36.8 °C)   Resp 16   Ht 5' 1\" (1.549 m)   Wt 197 lb 8 oz (89.6 kg)   SpO2 96%   BMI 37.32 kg/m²       PHYSICAL EXAM:    Orientation:  alert and oriented to person, place and time    Left Upper Extremity    Dressing/Incision:  dressing in place, clean, dry, and intact    Upper Extremity Motor :  Radial, Median, Ulnar, PIN, AIN nerves  intact    Upper Extremity Sensory: intact to light touch    Pulses:   BCR fingers    Compartments:soft      right LE:   Dressing C/D/I  Distal sensory intact  +2/4 PT and DP  +PF/DF/EHL  Compartments soft and compressible          LABS:  CBC:   Lab Results   Component Value Date/Time    WBC 9.1 11/04/2022 09:10 AM    RBC 3.01 11/04/2022 09:10 AM    HGB 10.5 11/04/2022 09:10 AM    HCT 33.4 11/04/2022 09:10 AM    .0 11/04/2022 09:10 AM    MCH 34.9 11/04/2022 09:10 AM    MCHC 31.4 11/04/2022 09:10 AM    RDW 15.4 11/04/2022 09:10 AM     11/04/2022 09:10 AM    MPV 10.7 11/04/2022 09:10 AM     X-ray right femur/knee and left wrist with stable fracture fixation    ASSESSMENT: POD # 1 Right distal femur ORIF and left distal radius ORIF      PLAN:  1:  Non weight bearing to right LE, Forearm WB to left UE. OK to access fistula for dialysis  2:  Hangar for Hinged Knee Brace to right LE for distal femur fracture S/P ORIF. Lock in extension for now.  OK to fit brace on POD#2  3:  OK for PT/OT   4:  Discharge planning as per PT/OT recommendations  5:  Deep venous thrombosis prophylaxis - ok to resume as per medicine   6:  Follow up office 1-2 weeks    Electronically signed by Cris Mchugh MD on 11/5/2022 at 9:36 AM

## 2022-11-06 LAB
ANION GAP SERPL CALCULATED.3IONS-SCNC: 12 MMOL/L (ref 7–16)
BUN BLDV-MCNC: 49 MG/DL (ref 6–23)
CALCIUM SERPL-MCNC: 9.2 MG/DL (ref 8.6–10.2)
CHLORIDE BLD-SCNC: 96 MMOL/L (ref 98–107)
CO2: 29 MMOL/L (ref 22–29)
CREAT SERPL-MCNC: 6.9 MG/DL (ref 0.5–1)
GFR SERPL CREATININE-BSD FRML MDRD: 6 ML/MIN/1.73
GLUCOSE BLD-MCNC: 185 MG/DL (ref 74–99)
HCT VFR BLD CALC: 23.7 % (ref 34–48)
HEMOGLOBIN: 7.3 G/DL (ref 11.5–15.5)
MAGNESIUM: 2.1 MG/DL (ref 1.6–2.6)
MCH RBC QN AUTO: 34.9 PG (ref 26–35)
MCHC RBC AUTO-ENTMCNC: 30.8 % (ref 32–34.5)
MCV RBC AUTO: 113.4 FL (ref 80–99.9)
METER GLUCOSE: 192 MG/DL (ref 74–99)
METER GLUCOSE: 205 MG/DL (ref 74–99)
METER GLUCOSE: 214 MG/DL (ref 74–99)
METER GLUCOSE: 282 MG/DL (ref 74–99)
PARATHYROID HORMONE INTACT: 466 PG/ML (ref 15–65)
PDW BLD-RTO: 15.9 FL (ref 11.5–15)
PHOSPHORUS: 6.5 MG/DL (ref 2.5–4.5)
PLATELET # BLD: 157 E9/L (ref 130–450)
PMV BLD AUTO: 10.5 FL (ref 7–12)
POTASSIUM SERPL-SCNC: 5 MMOL/L (ref 3.5–5)
RBC # BLD: 2.09 E12/L (ref 3.5–5.5)
SODIUM BLD-SCNC: 137 MMOL/L (ref 132–146)
WBC # BLD: 10.1 E9/L (ref 4.5–11.5)

## 2022-11-06 PROCEDURE — 36415 COLL VENOUS BLD VENIPUNCTURE: CPT

## 2022-11-06 PROCEDURE — 2580000003 HC RX 258: Performed by: PHYSICIAN ASSISTANT

## 2022-11-06 PROCEDURE — 83735 ASSAY OF MAGNESIUM: CPT

## 2022-11-06 PROCEDURE — 6360000002 HC RX W HCPCS: Performed by: PHYSICIAN ASSISTANT

## 2022-11-06 PROCEDURE — 85027 COMPLETE CBC AUTOMATED: CPT

## 2022-11-06 PROCEDURE — 6370000000 HC RX 637 (ALT 250 FOR IP): Performed by: FAMILY MEDICINE

## 2022-11-06 PROCEDURE — 6370000000 HC RX 637 (ALT 250 FOR IP): Performed by: PHYSICIAN ASSISTANT

## 2022-11-06 PROCEDURE — 84100 ASSAY OF PHOSPHORUS: CPT

## 2022-11-06 PROCEDURE — 82962 GLUCOSE BLOOD TEST: CPT

## 2022-11-06 PROCEDURE — 1200000000 HC SEMI PRIVATE

## 2022-11-06 PROCEDURE — 6370000000 HC RX 637 (ALT 250 FOR IP): Performed by: NURSE PRACTITIONER

## 2022-11-06 PROCEDURE — 80048 BASIC METABOLIC PNL TOTAL CA: CPT

## 2022-11-06 PROCEDURE — 83970 ASSAY OF PARATHORMONE: CPT

## 2022-11-06 RX ORDER — SEVELAMER CARBONATE 800 MG/1
1600 TABLET, FILM COATED ORAL
Status: DISCONTINUED | OUTPATIENT
Start: 2022-11-06 | End: 2022-11-10 | Stop reason: HOSPADM

## 2022-11-06 RX ORDER — MIDODRINE HYDROCHLORIDE 5 MG/1
15 TABLET ORAL DAILY
Status: DISCONTINUED | OUTPATIENT
Start: 2022-11-06 | End: 2022-11-10 | Stop reason: HOSPADM

## 2022-11-06 RX ADMIN — Medication 2000 UNITS: at 08:35

## 2022-11-06 RX ADMIN — INSULIN GLARGINE 6 UNITS: 100 INJECTION, SOLUTION SUBCUTANEOUS at 21:24

## 2022-11-06 RX ADMIN — INSULIN LISPRO 4 UNITS: 100 INJECTION, SOLUTION INTRAVENOUS; SUBCUTANEOUS at 10:52

## 2022-11-06 RX ADMIN — PROCHLORPERAZINE MALEATE 5 MG: 5 TABLET ORAL at 14:34

## 2022-11-06 RX ADMIN — GABAPENTIN 100 MG: 100 CAPSULE ORAL at 08:35

## 2022-11-06 RX ADMIN — SEVELAMER CARBONATE 800 MG: 800 TABLET, FILM COATED ORAL at 08:35

## 2022-11-06 RX ADMIN — ONDANSETRON 4 MG: 2 INJECTION INTRAMUSCULAR; INTRAVENOUS at 14:40

## 2022-11-06 RX ADMIN — INSULIN LISPRO 2 UNITS: 100 INJECTION, SOLUTION INTRAVENOUS; SUBCUTANEOUS at 16:31

## 2022-11-06 RX ADMIN — GABAPENTIN 100 MG: 100 CAPSULE ORAL at 21:19

## 2022-11-06 RX ADMIN — ESCITALOPRAM OXALATE 10 MG: 10 TABLET ORAL at 08:35

## 2022-11-06 RX ADMIN — HYDROMORPHONE HYDROCHLORIDE 0.5 MG: 0.5 INJECTION, SOLUTION INTRAMUSCULAR; INTRAVENOUS; SUBCUTANEOUS at 05:30

## 2022-11-06 RX ADMIN — OXYCODONE AND ACETAMINOPHEN 2 TABLET: 5; 325 TABLET ORAL at 03:56

## 2022-11-06 RX ADMIN — HEPARIN SODIUM 5000 UNITS: 10000 INJECTION INTRAVENOUS; SUBCUTANEOUS at 14:41

## 2022-11-06 RX ADMIN — MIDODRINE HYDROCHLORIDE 15 MG: 5 TABLET ORAL at 14:34

## 2022-11-06 RX ADMIN — BUSPIRONE HYDROCHLORIDE 10 MG: 10 TABLET ORAL at 21:19

## 2022-11-06 RX ADMIN — Medication 10 ML: at 21:00

## 2022-11-06 RX ADMIN — POLYETHYLENE GLYCOL 3350 17 G: 17 POWDER, FOR SOLUTION ORAL at 08:34

## 2022-11-06 RX ADMIN — OXYCODONE AND ACETAMINOPHEN 2 TABLET: 5; 325 TABLET ORAL at 16:27

## 2022-11-06 RX ADMIN — SEVELAMER CARBONATE 1600 MG: 800 TABLET, FILM COATED ORAL at 16:27

## 2022-11-06 RX ADMIN — BUSPIRONE HYDROCHLORIDE 10 MG: 10 TABLET ORAL at 08:34

## 2022-11-06 RX ADMIN — HYDROMORPHONE HYDROCHLORIDE 0.5 MG: 0.5 INJECTION, SOLUTION INTRAMUSCULAR; INTRAVENOUS; SUBCUTANEOUS at 01:22

## 2022-11-06 RX ADMIN — HYDROMORPHONE HYDROCHLORIDE 0.5 MG: 0.5 INJECTION, SOLUTION INTRAMUSCULAR; INTRAVENOUS; SUBCUTANEOUS at 21:19

## 2022-11-06 RX ADMIN — HEPARIN SODIUM 5000 UNITS: 10000 INJECTION INTRAVENOUS; SUBCUTANEOUS at 21:24

## 2022-11-06 RX ADMIN — HEPARIN SODIUM 5000 UNITS: 10000 INJECTION INTRAVENOUS; SUBCUTANEOUS at 05:31

## 2022-11-06 RX ADMIN — METOPROLOL SUCCINATE 25 MG: 25 TABLET, FILM COATED, EXTENDED RELEASE ORAL at 08:35

## 2022-11-06 RX ADMIN — HYDROMORPHONE HYDROCHLORIDE 0.5 MG: 0.5 INJECTION, SOLUTION INTRAMUSCULAR; INTRAVENOUS; SUBCUTANEOUS at 14:40

## 2022-11-06 RX ADMIN — Medication 10 ML: at 08:36

## 2022-11-06 RX ADMIN — GABAPENTIN 100 MG: 100 CAPSULE ORAL at 14:34

## 2022-11-06 ASSESSMENT — PAIN DESCRIPTION - DESCRIPTORS
DESCRIPTORS: ACHING;SHARP;SORE
DESCRIPTORS: ACHING
DESCRIPTORS: ACHING;DISCOMFORT;SHOOTING
DESCRIPTORS: DISCOMFORT;ACHING;SHARP
DESCRIPTORS: ACHING
DESCRIPTORS: ACHING;DISCOMFORT;SHARP

## 2022-11-06 ASSESSMENT — PAIN SCALES - GENERAL
PAINLEVEL_OUTOF10: 2
PAINLEVEL_OUTOF10: 5
PAINLEVEL_OUTOF10: 7
PAINLEVEL_OUTOF10: 4
PAINLEVEL_OUTOF10: 7
PAINLEVEL_OUTOF10: 10
PAINLEVEL_OUTOF10: 7
PAINLEVEL_OUTOF10: 10
PAINLEVEL_OUTOF10: 0
PAINLEVEL_OUTOF10: 10

## 2022-11-06 ASSESSMENT — PAIN DESCRIPTION - ORIENTATION
ORIENTATION: RIGHT
ORIENTATION: RIGHT
ORIENTATION: RIGHT;LEFT;LOWER

## 2022-11-06 ASSESSMENT — PAIN DESCRIPTION - LOCATION
LOCATION: ARM;HIP;KNEE;LEG
LOCATION: ARM;HIP;LEG
LOCATION: LEG
LOCATION: LEG
LOCATION: ARM;HIP;KNEE;LEG

## 2022-11-06 ASSESSMENT — PAIN DESCRIPTION - ONSET
ONSET: ON-GOING

## 2022-11-06 ASSESSMENT — PAIN - FUNCTIONAL ASSESSMENT
PAIN_FUNCTIONAL_ASSESSMENT: INTOLERABLE, UNABLE TO DO ANY ACTIVE OR PASSIVE ACTIVITIES
PAIN_FUNCTIONAL_ASSESSMENT: PREVENTS OR INTERFERES SOME ACTIVE ACTIVITIES AND ADLS
PAIN_FUNCTIONAL_ASSESSMENT: PREVENTS OR INTERFERES WITH ALL ACTIVE AND SOME PASSIVE ACTIVITIES
PAIN_FUNCTIONAL_ASSESSMENT: PREVENTS OR INTERFERES SOME ACTIVE ACTIVITIES AND ADLS

## 2022-11-06 ASSESSMENT — PAIN DESCRIPTION - PAIN TYPE
TYPE: ACUTE PAIN;SURGICAL PAIN
TYPE: ACUTE PAIN;SURGICAL PAIN
TYPE: SURGICAL PAIN;ACUTE PAIN
TYPE: SURGICAL PAIN

## 2022-11-06 ASSESSMENT — PAIN DESCRIPTION - FREQUENCY
FREQUENCY: CONTINUOUS
FREQUENCY: INTERMITTENT

## 2022-11-06 ASSESSMENT — PAIN SCALES - WONG BAKER
WONGBAKER_NUMERICALRESPONSE: 0

## 2022-11-06 NOTE — PROGRESS NOTES
Department of Internal Medicine  Nephrology Attending Progress Note    SUBJECTIVE:  We are following this patient for end-stage renal failure . 11/6/22-she was resting at the time of my exam.  She did awaken to me she did not sleep well last night.   She is in no distress and offers no complaints    PROBLEM LIST:    Patient Active Problem List   Diagnosis    Neurologic gait dysfunction    Diabetes mellitus ([address]    Hypertension    Arthritis    Knee problem    Anemia due to stage 3 chronic kidney disease    Primary osteoarthritis of both knees    Moderate protein-calorie malnutrition (HCC)    Pressure injury of sacral region, stage 4 (HCC)    Pressure injury of right hip, stage 3 (HCC)    Left bundle branch block    Pure hypercholesterolemia    Moderate obesity    Failure to thrive in adult    Pressure injury of sacral region, stage 3 (HCC)    Decreased dorsalis pedis pulse    ESRD on hemodialysis ([address]    Closed bicondylar fracture of distal femur, right, initial encounter ([address]    Other fracture of right femur, initial encounter for closed fracture ([address]    Closed fracture of distal ends of left radius and ulna    Closed bicondylar fracture of distal end of right femur ([address]        PAST MEDICAL HISTORY:    Past Medical History:   Diagnosis Date    Anemia in chronic kidney disease     Anxiety and depression     Arthritis     Chronic pain syndrome     COVID-19 05/2020    COVID-19     Decreased dorsalis pedis pulse 10/25/2022    Diabetes mellitus ([address]     Dysphagia, oral phase     ESRD on hemodialysis ([address] 10/25/2022    GERD (gastroesophageal reflux disease)     Hemodialysis patient ([address]     M-W-F    Hyperkalemia     Hypertension     Hypertensive kidney disease with stage 4 chronic kidney disease (HCC)     Insomnia     Kidney stones     MDD (major depressive disorder)     Moderate protein-calorie malnutrition (HCC)     Morbid obesity (HCC)     Muscle weakness (generalized)     Obesity     Pressure injury of sacral region, stage 3 (HCC) 10/25/2022    Pressure ulcer of sacral region     Sacral pressure ulcer 08/03/2021    stage 4    Unspecified osteoarthritis, unspecified site     Weakness generalized        MEDS (scheduled):   busPIRone  10 mg Oral BID    escitalopram  10 mg Oral Daily    insulin glargine  6 Units SubCUTAneous Nightly    metoprolol succinate  25 mg Oral Daily    midodrine  15 mg Oral Once per day on Mon Wed Fri    polyethylene glycol  17 g Oral Daily    vitamin D  2,000 Units Oral Daily    sevelamer  800 mg Oral TID WC    sodium chloride flush  5-40 mL IntraVENous 2 times per day    heparin (porcine)  5,000 Units SubCUTAneous 3 times per day    insulin lispro  0-8 Units SubCUTAneous TID WC    insulin lispro  0-4 Units SubCUTAneous Nightly    gabapentin  100 mg Oral TID       MEDS (infusions):   sodium chloride      dextrose         MEDS (prn):  bisacodyl, magnesium hydroxide, prochlorperazine, traZODone, sodium chloride flush, sodium chloride, ondansetron **OR** ondansetron, polyethylene glycol, acetaminophen **OR** acetaminophen, glucose, dextrose bolus **OR** dextrose bolus, glucagon (rDNA), dextrose, oxyCODONE-acetaminophen **OR** oxyCODONE-acetaminophen, HYDROmorphone **OR** HYDROmorphone    DIET:    ADULT DIET; Regular  ADULT ORAL NUTRITION SUPPLEMENT; Breakfast, Dinner; Low Calorie/High Protein Oral Supplement  ADULT ORAL NUTRITION SUPPLEMENT; Breakfast, Dinner;  Wound Healing Oral Supplement      PHYSICAL EXAM:      Patient Vitals for the past 24 hrs:   BP Temp Temp src Pulse Resp SpO2   11/06/22 0832 (!) 99/33 98.2 °F (36.8 °C) Oral 69 18 99 %   11/05/22 2019 (!) 127/55 98.2 °F (36.8 °C) Oral 76 17 98 %   11/05/22 1512 (!) 83/54 97.7 °F (36.5 °C) Oral 81 16 --   11/05/22 1358 -- -- -- -- 18 --   11/05/22 1200 (!) 92/42 98.1 °F (36.7 °C) Oral 79 16 97 %          Intake/Output Summary (Last 24 hours) at 11/6/2022 0916  Last data filed at 11/5/2022 1358  Gross per 24 hour   Intake 10 ml   Output -- Net 10 ml       Wt Readings from Last 3 Encounters:   11/05/22 197 lb 8 oz (89.6 kg)   11/01/22 192 lb (87.1 kg)   10/25/22 192 lb (87.1 kg)       Constitutional:  Patient in no acute distress  Head: normocephalic, atraumatic  Cardiovascular: S1-S2 no S3 or rub  Respiratory:  Clear upper diminished in the bases  Gastrointestinal: soft, nontender, nondistended  Ext: Right ankle with cast left trace edema, left wrist cast  Neuro: Awake alert and oriented  Skin: dry, no rash      DATA:      Recent Labs     11/03/22 2212 11/04/22  0910 11/06/22  0348   WBC 13.0* 9.1 10.1   HGB 11.1* 10.5* 7.3*   HCT 35.3 33.4* 23.7*   .7* 111.0* 113.4*    205 157     Recent Labs     11/03/22 2212 11/04/22  0910 11/06/22  0348    141 137   K 4.7 5.4* 5.0   CL 97* 100 96*   CO2 27 26 29   BUN 34* 37* 49*   CREATININE 5.4* 6.2* 6.9*   LABGLOM 8 7 6   GLUCOSE 242* 177* 185*   CALCIUM 9.7 9.6 9.2     Recent Labs     11/03/22 2212   ALT 17     Lab Results   Component Value Date    LABALBU 3.6 11/03/2022    LABALBU 3.0 (L) 10/12/2022    LABALBU 3.2 (L) 09/14/2022       Iron studies:  Lab Results   Component Value Date    FERRITIN 767 05/05/2021    IRON 74 05/05/2021    TIBC 116 (L) 05/05/2021     Vitamin B-12   Date Value Ref Range Status   05/05/2021 895 211 - 946 pg/mL Final     Folate   Date Value Ref Range Status   05/05/2021 >20.0 4.8 - 24.2 ng/mL Final       Bone disease:  Lab Results   Component Value Date    MG 2.1 11/06/2022    PHOS 6.5 (H) 11/06/2022     Vit D, 25-Hydroxy   Date Value Ref Range Status   07/15/2022 45 30 - 100 ng/mL Final     Comment:     <20 ng/mL. ........... Pink Teo Deficient  20-30 ng/mL. ......... Pink Toe Insufficient   ng/mL. ........ Pink Teo Sufficient  >100 ng/mL. .......... Pink Teo Toxic       PTH   Date Value Ref Range Status   11/06/2022 466 (H) 15 - 65 pg/mL Final       No components found for: URIC    Lab Results   Component Value Date/Time    COLORU Yellow 07/08/2022 11:04 PM    NITRU Negative 07/08/2022 11:04 PM    GLUCOSEU 100 07/08/2022 11:04 PM    GLUCOSEU NEGATIVE 08/17/2011 10:30 PM    KETUA Negative 07/08/2022 11:04 PM    UROBILINOGEN 0.2 07/08/2022 11:04 PM    BILIRUBINUR Negative 07/08/2022 11:04 PM    BILIRUBINUR NEGATIVE 08/17/2011 10:30 PM       No results found for: Claudette Erichsen        IMPRESSION/RECOMMENDATIONS:      ESRD-   Chronic IHD at [facility] MWF  Plan dialysis treatment in the morning    2. Hyperkalemia-  K 5.0 this am, manage with HD    3. Hypertension with CKD 5-  Intermittent hypotension with HD  Chronically on midodrine with HD treatments. 4. Anemia of CKD-  Follow post op  Will dose ARUN 3 times weekly with dialysis    5. Hyperparathyroidism of renal origin -  PO4 6.5 PTH pending  On sevelamer 1 tablet 3 times a day with meals  Will increase to 2 tablets with meals        PABLO Ibarra - CNP    Patient seen and examined. No family member is present at the bedside. Chart reviewed. I had a face to face encounter with the patient. Agree with exam.    Agree with  formulation, assessment and plan as outlined above and directed by me. Addition and corrections were done as deemed appropriate. My exam and plan include:     Continue current treatment as outlined above. Patient stable from a renal standpoint. We will continue dialytic support.       Buffy Rosas MD  Nephrology        Electronically signed by Buffy Rosas MD on 11/6/2022 at 2:01 PM

## 2022-11-06 NOTE — PROGRESS NOTES
Subjective: The patient is awake and alert. No problems overnight. Denies chest pain, angina, and dyspnea. Denies abdominal pain. Tolerating diet. No nausea or vomiting. Pain controlled. Objective:    BP (!) 127/55   Pulse 76   Temp 98.2 °F (36.8 °C) (Oral)   Resp 17   Ht 5' 1\" (1.549 m)   Wt 197 lb 8 oz (89.6 kg)   SpO2 98%   BMI 37.32 kg/m²     Heart:  RRR, no murmurs, gallops, or rubs.   Lungs:  CTA bilaterally, no wheeze, rales or rhonchi  Abd: bowel sounds present, nontender, nondistended, no masses  Extrem:  No clubbing, cyanosis, or edema    CBC with Differential:    Lab Results   Component Value Date/Time    WBC 10.1 11/06/2022 03:48 AM    RBC 2.09 11/06/2022 03:48 AM    HGB 7.3 11/06/2022 03:48 AM    HCT 23.7 11/06/2022 03:48 AM     11/06/2022 03:48 AM    .4 11/06/2022 03:48 AM    MCH 34.9 11/06/2022 03:48 AM    MCHC 30.8 11/06/2022 03:48 AM    RDW 15.9 11/06/2022 03:48 AM    NRBC 0.9 05/25/2020 02:00 PM    SEGSPCT 71 08/16/2013 05:00 AM    BANDSPCT 1 03/29/2016 07:37 PM    METASPCT 0.9 05/10/2020 03:45 AM    LYMPHOPCT 4.8 11/03/2022 10:12 PM    PROMYELOPCT 0.9 05/09/2020 04:15 AM    MONOPCT 7.5 11/03/2022 10:12 PM    MYELOPCT 1.7 05/11/2020 04:45 AM    BASOPCT 0.2 11/03/2022 10:12 PM    MONOSABS 0.97 11/03/2022 10:12 PM    LYMPHSABS 0.63 11/03/2022 10:12 PM    EOSABS 0.02 11/03/2022 10:12 PM    BASOSABS 0.03 11/03/2022 10:12 PM     CMP:    Lab Results   Component Value Date/Time     11/06/2022 03:48 AM    K 5.0 11/06/2022 03:48 AM    K 6.4 07/08/2022 09:42 PM    CL 96 11/06/2022 03:48 AM    CO2 29 11/06/2022 03:48 AM    BUN 49 11/06/2022 03:48 AM    CREATININE 6.9 11/06/2022 03:48 AM    GFRAA 7 10/12/2022 03:49 AM    LABGLOM 6 11/06/2022 03:48 AM    GLUCOSE 185 11/06/2022 03:48 AM    GLUCOSE 149 10/12/2011 09:59 AM    PROT 6.4 11/03/2022 10:12 PM    LABALBU 3.6 11/03/2022 10:12 PM    LABALBU 4.1 10/12/2011 09:59 AM    CALCIUM 9.2 11/06/2022 03:48 AM    BILITOT 0.3 11/03/2022 10:12 PM    ALKPHOS 100 11/03/2022 10:12 PM    AST 23 11/03/2022 10:12 PM    ALT 17 11/03/2022 10:12 PM        Assessment:    Patient Active Problem List   Diagnosis    Neurologic gait dysfunction    Diabetes mellitus ([address]    Hypertension    Arthritis    Knee problem    Anemia due to stage 3 chronic kidney disease    Primary osteoarthritis of both knees    Moderate protein-calorie malnutrition (HCC)    Pressure injury of sacral region, stage 4 (HCC)    Pressure injury of right hip, stage 3 (HCC)    Left bundle branch block    Pure hypercholesterolemia    Moderate obesity    Failure to thrive in adult    Pressure injury of sacral region, stage 3 (HCC)    Decreased dorsalis pedis pulse    ESRD on hemodialysis ([address]    Closed bicondylar fracture of distal femur, right, initial encounter ([address]    Other fracture of right femur, initial encounter for closed fracture ([address]    Closed fracture of distal ends of left radius and ulna    Closed bicondylar fracture of distal end of right femur [facility])       Plan:  Discharge planning        Anisa Nash MD  7:12 AM  11/6/2022

## 2022-11-06 NOTE — PLAN OF CARE
Problem: Discharge Planning  Goal: Discharge to home or other facility with appropriate resources  11/5/2022 1810 by Sky Dickinson RN  Outcome: Progressing  Flowsheets (Taken 11/5/2022 1200)  Discharge to home or other facility with appropriate resources:   Identify barriers to discharge with patient and caregiver   Arrange for needed discharge resources and transportation as appropriate   Identify discharge learning needs (meds, wound care, etc)   Refer to discharge planning if patient needs post-hospital services based on physician order or complex needs related to functional status, cognitive ability or social support system     Problem: Pain  Goal: Verbalizes/displays adequate comfort level or baseline comfort level  11/5/2022 2322 by Karlie Manning RN  Outcome: Progressing  11/5/2022 1810 by Sky Dickinson RN  Outcome: Progressing  Flowsheets (Taken 11/5/2022 1200)  Verbalizes/displays adequate comfort level or baseline comfort level:   Encourage patient to monitor pain and request assistance   Assess pain using appropriate pain scale   Administer analgesics based on type and severity of pain and evaluate response   Implement non-pharmacological measures as appropriate and evaluate response   Consider cultural and social influences on pain and pain management   Notify Licensed Independent Practitioner if interventions unsuccessful or patient reports new pain     Problem: Skin/Tissue Integrity  Goal: Absence of new skin breakdown  Description: 1. Monitor for areas of redness and/or skin breakdown  2. Assess vascular access sites hourly  3. Every 4-6 hours minimum:  Change oxygen saturation probe site  4. Every 4-6 hours:  If on nasal continuous positive airway pressure, respiratory therapy assess nares and determine need for appliance change or resting period.   11/5/2022 2322 by Karlie Manning RN  Outcome: Progressing  11/5/2022 1810 by Sky Dickinson RN  Outcome: Progressing     Problem: ABCDS Injury Assessment  Goal: Absence of physical injury  11/5/2022 2322 by Rea Candelario RN  Outcome: Progressing  11/5/2022 1810 by Poornima Floyd RN  Outcome: Progressing  Flowsheets (Taken 11/5/2022 1200)  Absence of Physical Injury: Implement safety measures based on patient assessment     Problem: Safety - Adult  Goal: Free from fall injury  11/5/2022 2322 by Rea Candelario RN  Outcome: Progressing  11/5/2022 1810 by Poornima Floyd RN  Outcome: Progressing  Flowsheets (Taken 11/5/2022 1200)  Free From Fall Injury: Based on caregiver fall risk screen, instruct family/caregiver to ask for assistance with transferring infant if caregiver noted to have fall risk factors     Problem: Chronic Conditions and Co-morbidities  Goal: Patient's chronic conditions and co-morbidity symptoms are monitored and maintained or improved  11/5/2022 2322 by Rea Candelario RN  Outcome: Progressing  11/5/2022 1810 by Poornima Floyd RN  Outcome: Progressing  Flowsheets (Taken 11/5/2022 1200)  Care Plan - Patient's Chronic Conditions and Co-Morbidity Symptoms are Monitored and Maintained or Improved:   Monitor and assess patient's chronic conditions and comorbid symptoms for stability, deterioration, or improvement   Collaborate with multidisciplinary team to address chronic and comorbid conditions and prevent exacerbation or deterioration   Update acute care plan with appropriate goals if chronic or comorbid symptoms are exacerbated and prevent overall improvement and discharge     Problem: Nutrition Deficit:  Goal: Optimize nutritional status  11/5/2022 1810 by Poornima Floyd RN  Outcome: Progressing

## 2022-11-07 LAB
ANION GAP SERPL CALCULATED.3IONS-SCNC: 13 MMOL/L (ref 7–16)
ANISOCYTOSIS: ABNORMAL
BASOPHILS ABSOLUTE: 0 E9/L (ref 0–0.2)
BASOPHILS RELATIVE PERCENT: 0 % (ref 0–2)
BUN BLDV-MCNC: 73 MG/DL (ref 6–23)
CALCIUM SERPL-MCNC: 8.4 MG/DL (ref 8.6–10.2)
CHLORIDE BLD-SCNC: 96 MMOL/L (ref 98–107)
CO2: 26 MMOL/L (ref 22–29)
CREAT SERPL-MCNC: 8.5 MG/DL (ref 0.5–1)
EOSINOPHILS ABSOLUTE: 0.39 E9/L (ref 0.05–0.5)
EOSINOPHILS RELATIVE PERCENT: 4.3 % (ref 0–6)
GFR SERPL CREATININE-BSD FRML MDRD: 5 ML/MIN/1.73
GLUCOSE BLD-MCNC: 201 MG/DL (ref 74–99)
HCT VFR BLD CALC: 23.1 % (ref 34–48)
HEMOGLOBIN: 7 G/DL (ref 11.5–15.5)
LYMPHOCYTES ABSOLUTE: 0.91 E9/L (ref 1.5–4)
LYMPHOCYTES RELATIVE PERCENT: 9.6 % (ref 20–42)
MAGNESIUM: 2.3 MG/DL (ref 1.6–2.6)
MCH RBC QN AUTO: 35 PG (ref 26–35)
MCHC RBC AUTO-ENTMCNC: 30.3 % (ref 32–34.5)
MCV RBC AUTO: 115.5 FL (ref 80–99.9)
METER GLUCOSE: 167 MG/DL (ref 74–99)
METER GLUCOSE: 191 MG/DL (ref 74–99)
METER GLUCOSE: 256 MG/DL (ref 74–99)
MONOCYTES ABSOLUTE: 0.27 E9/L (ref 0.1–0.95)
MONOCYTES RELATIVE PERCENT: 2.6 % (ref 2–12)
NEUTROPHILS ABSOLUTE: 7.55 E9/L (ref 1.8–7.3)
NEUTROPHILS RELATIVE PERCENT: 82.6 % (ref 43–80)
NUCLEATED RED BLOOD CELLS: 0 /100 WBC
PDW BLD-RTO: 15.2 FL (ref 11.5–15)
PHOSPHORUS: 7.3 MG/DL (ref 2.5–4.5)
PLATELET # BLD: 160 E9/L (ref 130–450)
PMV BLD AUTO: 11.1 FL (ref 7–12)
POTASSIUM SERPL-SCNC: 5.4 MMOL/L (ref 3.5–5)
PROMYELOCYTES PERCENT: 0.9 % (ref 0–0)
RBC # BLD: 2 E12/L (ref 3.5–5.5)
SODIUM BLD-SCNC: 135 MMOL/L (ref 132–146)
WBC # BLD: 9.1 E9/L (ref 4.5–11.5)

## 2022-11-07 PROCEDURE — 2580000003 HC RX 258: Performed by: PHYSICIAN ASSISTANT

## 2022-11-07 PROCEDURE — 84100 ASSAY OF PHOSPHORUS: CPT

## 2022-11-07 PROCEDURE — 36415 COLL VENOUS BLD VENIPUNCTURE: CPT

## 2022-11-07 PROCEDURE — 1200000000 HC SEMI PRIVATE

## 2022-11-07 PROCEDURE — 83735 ASSAY OF MAGNESIUM: CPT

## 2022-11-07 PROCEDURE — 6370000000 HC RX 637 (ALT 250 FOR IP): Performed by: FAMILY MEDICINE

## 2022-11-07 PROCEDURE — 6370000000 HC RX 637 (ALT 250 FOR IP): Performed by: PHYSICIAN ASSISTANT

## 2022-11-07 PROCEDURE — 82962 GLUCOSE BLOOD TEST: CPT

## 2022-11-07 PROCEDURE — 6360000002 HC RX W HCPCS: Performed by: NURSE PRACTITIONER

## 2022-11-07 PROCEDURE — 90935 HEMODIALYSIS ONE EVALUATION: CPT

## 2022-11-07 PROCEDURE — 85025 COMPLETE CBC W/AUTO DIFF WBC: CPT

## 2022-11-07 PROCEDURE — 6360000002 HC RX W HCPCS: Performed by: PHYSICIAN ASSISTANT

## 2022-11-07 PROCEDURE — 97165 OT EVAL LOW COMPLEX 30 MIN: CPT

## 2022-11-07 PROCEDURE — 80048 BASIC METABOLIC PNL TOTAL CA: CPT

## 2022-11-07 PROCEDURE — 6370000000 HC RX 637 (ALT 250 FOR IP): Performed by: NURSE PRACTITIONER

## 2022-11-07 PROCEDURE — 97161 PT EVAL LOW COMPLEX 20 MIN: CPT

## 2022-11-07 RX ORDER — ACETIC ACID 0.25 G/100ML
1000 IRRIGANT IRRIGATION DAILY
Status: DISCONTINUED | OUTPATIENT
Start: 2022-11-07 | End: 2022-11-10 | Stop reason: HOSPADM

## 2022-11-07 RX ADMIN — SEVELAMER CARBONATE 1600 MG: 800 TABLET, FILM COATED ORAL at 18:17

## 2022-11-07 RX ADMIN — INSULIN LISPRO 4 UNITS: 100 INJECTION, SOLUTION INTRAVENOUS; SUBCUTANEOUS at 18:18

## 2022-11-07 RX ADMIN — BUSPIRONE HYDROCHLORIDE 10 MG: 10 TABLET ORAL at 23:56

## 2022-11-07 RX ADMIN — GABAPENTIN 100 MG: 100 CAPSULE ORAL at 23:56

## 2022-11-07 RX ADMIN — SEVELAMER CARBONATE 1600 MG: 800 TABLET, FILM COATED ORAL at 12:00

## 2022-11-07 RX ADMIN — OXYCODONE AND ACETAMINOPHEN 2 TABLET: 5; 325 TABLET ORAL at 14:08

## 2022-11-07 RX ADMIN — INSULIN GLARGINE 6 UNITS: 100 INJECTION, SOLUTION SUBCUTANEOUS at 23:57

## 2022-11-07 RX ADMIN — HYDROMORPHONE HYDROCHLORIDE 0.5 MG: 0.5 INJECTION, SOLUTION INTRAMUSCULAR; INTRAVENOUS; SUBCUTANEOUS at 05:38

## 2022-11-07 RX ADMIN — Medication 2000 UNITS: at 12:00

## 2022-11-07 RX ADMIN — ESCITALOPRAM OXALATE 10 MG: 10 TABLET ORAL at 12:00

## 2022-11-07 RX ADMIN — HEPARIN SODIUM 5000 UNITS: 10000 INJECTION INTRAVENOUS; SUBCUTANEOUS at 14:09

## 2022-11-07 RX ADMIN — GABAPENTIN 100 MG: 100 CAPSULE ORAL at 14:02

## 2022-11-07 RX ADMIN — MIDODRINE HYDROCHLORIDE 15 MG: 5 TABLET ORAL at 12:00

## 2022-11-07 RX ADMIN — EPOETIN ALFA-EPBX 10000 UNITS: 10000 INJECTION, SOLUTION INTRAVENOUS; SUBCUTANEOUS at 12:01

## 2022-11-07 RX ADMIN — HEPARIN SODIUM 5000 UNITS: 10000 INJECTION INTRAVENOUS; SUBCUTANEOUS at 05:38

## 2022-11-07 RX ADMIN — TRAZODONE HYDROCHLORIDE 25 MG: 50 TABLET ORAL at 01:17

## 2022-11-07 RX ADMIN — HEPARIN SODIUM 5000 UNITS: 10000 INJECTION INTRAVENOUS; SUBCUTANEOUS at 23:56

## 2022-11-07 RX ADMIN — Medication 10 ML: at 23:57

## 2022-11-07 RX ADMIN — HYDROMORPHONE HYDROCHLORIDE 0.5 MG: 0.5 INJECTION, SOLUTION INTRAMUSCULAR; INTRAVENOUS; SUBCUTANEOUS at 01:17

## 2022-11-07 ASSESSMENT — PAIN DESCRIPTION - LOCATION
LOCATION: HIP;KNEE;LEG
LOCATION: HIP;LEG;KNEE
LOCATION: RIB CAGE
LOCATION: LEG

## 2022-11-07 ASSESSMENT — PAIN DESCRIPTION - DESCRIPTORS
DESCRIPTORS: ACHING;DISCOMFORT;PRESSURE
DESCRIPTORS: ACHING;DISCOMFORT;SHOOTING
DESCRIPTORS: ACHING;BURNING
DESCRIPTORS: DULL;ACHING

## 2022-11-07 ASSESSMENT — PAIN DESCRIPTION - FREQUENCY
FREQUENCY: CONTINUOUS

## 2022-11-07 ASSESSMENT — PAIN DESCRIPTION - PAIN TYPE
TYPE: SURGICAL PAIN
TYPE: SURGICAL PAIN
TYPE: ACUTE PAIN;SURGICAL PAIN

## 2022-11-07 ASSESSMENT — PAIN DESCRIPTION - ONSET
ONSET: ON-GOING

## 2022-11-07 ASSESSMENT — PAIN SCALES - GENERAL
PAINLEVEL_OUTOF10: 0
PAINLEVEL_OUTOF10: 8
PAINLEVEL_OUTOF10: 7
PAINLEVEL_OUTOF10: 0
PAINLEVEL_OUTOF10: 2
PAINLEVEL_OUTOF10: 5
PAINLEVEL_OUTOF10: 3
PAINLEVEL_OUTOF10: 7
PAINLEVEL_OUTOF10: 0

## 2022-11-07 ASSESSMENT — PAIN - FUNCTIONAL ASSESSMENT
PAIN_FUNCTIONAL_ASSESSMENT: PREVENTS OR INTERFERES SOME ACTIVE ACTIVITIES AND ADLS
PAIN_FUNCTIONAL_ASSESSMENT: PREVENTS OR INTERFERES SOME ACTIVE ACTIVITIES AND ADLS
PAIN_FUNCTIONAL_ASSESSMENT: INTOLERABLE, UNABLE TO DO ANY ACTIVE OR PASSIVE ACTIVITIES

## 2022-11-07 ASSESSMENT — PAIN DESCRIPTION - ORIENTATION
ORIENTATION: LEFT;RIGHT;LOWER
ORIENTATION: RIGHT;LEFT;LOWER
ORIENTATION: RIGHT

## 2022-11-07 NOTE — PROGRESS NOTES
Department of Orthopedic Surgery      Subjective:  Pt has little complaints today. Pain controlled with medication. Patient headed to dialysis. Vitals  VITALS:  /62   Pulse 82   Temp 98.4 °F (36.9 °C) (Oral)   Resp 17   Ht 5' 1\" (1.549 m)   Wt 197 lb 8 oz (89.6 kg)   SpO2 96%   BMI 37.32 kg/m²       PHYSICAL EXAM:    Orientation:  alert and oriented to person, place and time    Left Upper Extremity    Dressing/Incision:  dressing in place, clean, dry, and intact    Upper Extremity Motor :  Radial, Median, Ulnar, PIN, AIN nerves  intact    Upper Extremity Sensory: intact to light touch    Pulses:   BCR fingers    Compartments:soft      right LE:   Dressing C/D/I  Distal sensory intact  +2/4 PT and DP  +PF/DF/EHL  Compartments soft and compressible          LABS:  CBC:   Lab Results   Component Value Date/Time    WBC 9.1 11/07/2022 05:59 AM    RBC 2.00 11/07/2022 05:59 AM    HGB 7.0 11/07/2022 05:59 AM    HCT 23.1 11/07/2022 05:59 AM    .5 11/07/2022 05:59 AM    MCH 35.0 11/07/2022 05:59 AM    MCHC 30.3 11/07/2022 05:59 AM    RDW 15.2 11/07/2022 05:59 AM     11/07/2022 05:59 AM    MPV 11.1 11/07/2022 05:59 AM       ASSESSMENT: POD # 3 Right distal femur ORIF and left distal radius ORIF  Acute postoperative blood loss anemia, asymptomatic       PLAN:  1:  Non weight bearing to right LE, Forearm WB to left UE. OK to access fistula for dialysis  2:  Hangar for Hinged Knee Brace to right LE for distal femur fracture S/P ORIF. Lock in extension for now. OK to fit brace now  3:  OK for PT/OT   4:  Discharge planning as per PT/OT recommendations  5:  Deep venous thrombosis prophylaxis - ok to resume as per medicine.  Will monitor H/H   6:  Follow up office 1-2 weeks

## 2022-11-07 NOTE — CARE COORDINATION
Transition of Care-Referral made to [facility] this morning, PT/OT to see. Call placed to department this morning to facilitate. Evaluation. Awaiting confirmation of acceptance. Dialysis MWF at ([phone]). POD # 3 Right distal femur ORIF and left distal radius ORIF .  for Hinged Knee Brace to right LE for distal femur fracture s/p ORIF, ok to fit brace and work with therapy. CM following.     Ke WOODN, RN  [facility]

## 2022-11-07 NOTE — PROGRESS NOTES
Subjective:    Seen in dialysis. The patient is awake and alert. No problems overnight. Denies chest pain, angina, and dyspnea. Denies abdominal pain. Tolerating diet. No nausea or vomiting. Objective:    BP (!) 131/33   Pulse 75   Temp 97.2 °F (36.2 °C) Comment: off unit/dialysis  Resp 18   Ht 5' 1\" (1.549 m)   Wt 210 lb 15.7 oz (95.7 kg)   SpO2 96%   BMI 39.86 kg/m²     Heart:  RRR, no murmurs, gallops, or rubs.   Lungs:  CTA bilaterally, no wheeze, rales or rhonchi  Abd: bowel sounds present, nontender, nondistended, no masses  Extrem:  No clubbing, cyanosis, or edema    CBC with Differential:    Lab Results   Component Value Date/Time    WBC 9.1 11/07/2022 05:59 AM    RBC 2.00 11/07/2022 05:59 AM    HGB 7.0 11/07/2022 05:59 AM    HCT 23.1 11/07/2022 05:59 AM     11/07/2022 05:59 AM    .5 11/07/2022 05:59 AM    MCH 35.0 11/07/2022 05:59 AM    MCHC 30.3 11/07/2022 05:59 AM    RDW 15.2 11/07/2022 05:59 AM    NRBC 0.0 11/07/2022 05:59 AM    SEGSPCT 71 08/16/2013 05:00 AM    BANDSPCT 1 03/29/2016 07:37 PM    METASPCT 0.9 05/10/2020 03:45 AM    LYMPHOPCT 9.6 11/07/2022 05:59 AM    PROMYELOPCT 0.9 11/07/2022 05:59 AM    MONOPCT 2.6 11/07/2022 05:59 AM    MYELOPCT 1.7 05/11/2020 04:45 AM    BASOPCT 0.0 11/07/2022 05:59 AM    MONOSABS 0.27 11/07/2022 05:59 AM    LYMPHSABS 0.91 11/07/2022 05:59 AM    EOSABS 0.39 11/07/2022 05:59 AM    BASOSABS 0.00 11/07/2022 05:59 AM     CMP:    Lab Results   Component Value Date/Time     11/07/2022 05:59 AM    K 5.4 11/07/2022 05:59 AM    K 6.4 07/08/2022 09:42 PM    CL 96 11/07/2022 05:59 AM    CO2 26 11/07/2022 05:59 AM    BUN 73 11/07/2022 05:59 AM    CREATININE 8.5 11/07/2022 05:59 AM    GFRAA 7 10/12/2022 03:49 AM    LABGLOM 5 11/07/2022 05:59 AM    GLUCOSE 201 11/07/2022 05:59 AM    GLUCOSE 149 10/12/2011 09:59 AM    PROT 6.4 11/03/2022 10:12 PM    LABALBU 3.6 11/03/2022 10:12 PM    LABALBU 4.1 10/12/2011 09:59 AM    CALCIUM 8.4 11/07/2022 05:59 AM BILITOT 0.3 11/03/2022 10:12 PM    ALKPHOS 100 11/03/2022 10:12 PM    AST 23 11/03/2022 10:12 PM    ALT 17 11/03/2022 10:12 PM        Assessment:    Patient Active Problem List   Diagnosis    Neurologic gait dysfunction    Diabetes mellitus ([address]    Hypertension    Arthritis    Knee problem    Anemia due to stage 3 chronic kidney disease    Primary osteoarthritis of both knees    Moderate protein-calorie malnutrition (HCC)    Pressure injury of sacral region, stage 4 (HCC)    Pressure injury of right hip, stage 3 (HCC)    Left bundle branch block    Pure hypercholesterolemia    Moderate obesity    Failure to thrive in adult    Pressure injury of sacral region, stage 3 (HCC)    Decreased dorsalis pedis pulse    ESRD on hemodialysis ([address]    Closed bicondylar fracture of distal femur, right, initial encounter ([address]    Other fracture of right femur, initial encounter for closed fracture ([address]    Closed fracture of distal ends of left radius and ulna    Closed bicondylar fracture of distal end of right femur ([address]       Plan:  Patient to be fitted with a hinged brace per ortho  Discharge planning        Jamil Christian MD  8:31 AM  11/7/2022

## 2022-11-07 NOTE — FLOWSHEET NOTE
Revisit  Awake, did not want turned. But allowed us to       11/07/22 1647   Wound 10/25/22 Coccyx #1   Date First Assessed/Time First Assessed: 10/25/22 1051   Present on Hospital Admission: Yes  Wound Approximate Age at First Assessment (Weeks): (c)   Primary Wound Type: Pressure Injury  Location: Coccyx  Wound Description (Comments): #1   Wound Image    Wound Etiology Pressure Stage 4   Wound Cleansed Cleansed with saline   Dressing/Treatment   (ns kerlex,fan)   Dressing Change Due 11/07/22   Wound Length (cm) 8 cm   Wound Width (cm) 3 cm   Wound Depth (cm) 1.5 cm   Wound Surface Area (cm^2) 24 cm^2   Change in Wound Size % (l*w) -58. 1   Wound Volume (cm^3) 36 cm^3   Wound Healing % -69   Wound Assessment   (pale)   Drainage Amount Small   Drainage Description Sanguinous;Green   Odor Mild   Carmen-wound Assessment   (discolored, macerated)     Per patient dressing not changed for 2 days. Per staff she refused  Lo air loss in place  Unable to assess rt heel-knee immobilizer, ace wrap over area. Heels floated . Heel protector applied. Plan Acetic acid dressings  Sydelle Lines.  ROLAND Marie, Wound Care Analgesia was given

## 2022-11-07 NOTE — PROGRESS NOTES
OCCUPATIONAL THERAPY INITIAL EVALUATION    [place] [address] [place] [address]  [facility] [address]         ZZEE:[birth year]                                                  Patient Name: Viv Wilson    MRN: [id number]    : 1950    Room: [address] [place] [address]      Evaluating OT: Stephanie Silver, OTR/L [id number]      Referring Marlene Booth MD    Specific Provider Orders/Date: 2022 OT eval and treat       Diagnosis:  Closed bicondylar fracture of distal femur    Surgery: 11-3-2022 Right distal femur fracture open reduction and internal fixation     Left distal radius fracture open reduction and internal fixation    Pertinent Medical History:  Anemia in chronic kidney disease, Anxiety and depression, Arthritis, Chronic pain syndrome, COVID-19, COVID-19, Decreased dorsalis pedis pulse, Diabetes mellitus ([address] Dysphagia, oral phase, ESRD on hemodialysis ([address] GERD (gastroesophageal reflux disease), Hemodialysis patient ([address] Hyperkalemia, Hypertension, Hypertensive kidney disease with stage 4 chronic kidney disease ([address] Insomnia, Kidney stones, MDD (major depressive disorder), Moderate protein-calorie malnutrition ([address] Morbid obesity ([address] Muscle weakness (generalized), Obesity, Pressure injury of sacral region, stage 3 ([address] Pressure ulcer of sacral region, Sacral pressure ulcer, Unspecified osteoarthritis, unspecified site, and Weakness generalized.      Past Medical History:   Diagnosis Date    Anemia in chronic kidney disease     Anxiety and depression     Arthritis     Chronic pain syndrome     COVID-19 2020    COVID-19     Decreased dorsalis pedis pulse 10/25/2022    Diabetes mellitus ([address]     Dysphagia, oral phase     ESRD on hemodialysis ([address] 10/25/2022    GERD (gastroesophageal reflux disease)     Hemodialysis patient ([address]     M-W-F    Hyperkalemia     Hypertension     Hypertensive kidney disease with stage 4 chronic kidney disease (HCC)     Insomnia Kidney stones     MDD (major depressive disorder)     Moderate protein-calorie malnutrition (HCC)     Morbid obesity (HCC)     Muscle weakness (generalized)     Obesity     Pressure injury of sacral region, stage 3 ([address] 10/25/2022    Pressure ulcer of sacral region     Sacral pressure ulcer 08/03/2021    stage 4    Unspecified osteoarthritis, unspecified site     Weakness generalized          Past Surgical History:   Procedure Laterality Date    ABDOMEN SURGERY N/A 5/4/2020    SACRAL WOUND DEBRIDEMENT CALL   WITH TIME AVAIL AM performed by Josh Lu MD at [address]   x3    CHOLECYSTECTOMY  3/31/16    Laparoscopic-Dr. Og Reyes  2011     for kidney stones    DIALYSIS FISTULA CREATION Left 8/5/2021    INSERTION FISTULA LEFT ARM performed by Lizett John MD at [address] Right 11/18/2021    REVISION AV FISTULA LEFT ARM performed by Lizett John MD at [place] [address] Right 11/4/2022    RIGHT DISTAL FEMUR OPEN REDUCTION INTERNAL FIXATION ++SYNTHES++ performed by Laron Merlin, MD at [facility]  2009     right     UPPER GASTROINTESTINAL ENDOSCOPY  2.18.15    Dr. Jacky Edmondson Findings: Mild Gastrits and Duodenitis, 2cm Hiatal Hernia    UPPER GASTROINTESTINAL ENDOSCOPY N/A 5/8/2020    EGD CONTROL HEMORRHAGE performed by Josh Lu MD at [address] N/A 5/22/2020    EGD BEDSIDE performed by Grecia Meadows MD at [place] [address] 5/6/2021    INSERTION TUNNELED DIALYSIS CATHETER performed by Lizett John MD at [address] [facility] [place] Left 11/4/2022    LEFT DISTAL RADIUS OPEN REDUCTION INTERNAL FIXATION    ++SYNTHES++ performed by Laron Merlin, MD at [facility] OR      Precautions:  Fall Risk, NWB RLE with hinged knee brace locked in extension, Forearm WB on LUE  Pressure ulcers in sacral region     Assessment of current deficits    [] Functional mobility  [x]ADLs  [x] Strength               [x]Cognition    [x] Functional transfers   [x] IADLs         [x] Safety Awareness   [x]Endurance    [x] Fine Coordination              [x] Balance      [] Vision/perception   [x]Sensation     []Gross Motor Coordination  [] ROM  [] Delirium                   [] Motor Control     OT PLAN OF CARE   OT POC based on physician orders, patient diagnosis and results of clinical assessment    Frequency/Duration 2-5 days/wk for 2 weeks PRN   Specific OT Treatment Interventions to include:   * Instruction/training on adapted ADL techniques and AE recommendations to increase functional independence within precautions       * Training on energy conservation strategies, correct breathing pattern and techniques to improve independence/tolerance for self-care routine  * Functional transfer/mobility training/DME recommendations for increased independence, safety, and fall prevention  * Patient/Family education to increase follow through with safety techniques and functional independence  * Recommendation of environmental modifications for increased safety with functional transfers/mobility and ADLs  * Therapeutic exercise to improve motor endurance, ROM, and functional strength for ADLs/functional transfers  * Therapeutic activities to facilitate/challenge dynamic balance, stand tolerance for increased safety and independence with ADLs  * Therapeutic activities to facilitate gross/fine motor skills for increased independence with ADLs    Recommended Adaptive Equipment:  TBD     Home Living: Pt lives with her son  in a 2 story home with ramped entrance   Stair glide to second floor    Bathroom setup: half bath on first floor, full on second   Equipment owned: w/c for community use, FWW in home    Prior Level of Function: Pt reports to have spent the past three years in a nursing home and having only been home for the past two weeks prior to her fall.  She was currently sponge bathing and had assist from son who worked with IADLs. Driving: no  Occupation: no    Pain Level: pt indicated pain in L knee and sacral region with movement;  Cognition: A&O: 3/3; Follows 1-2 step directions      Functional Assessment:  AM-PAC Daily Activity Raw Score: 11/24   Initial Eval Status  Date: 11/7/2022 Treatment Status  Date: STGs = LTGs  Time frame: 10-14 days   Feeding Minimal Assist     Grooming Moderate Assist   Independent    UB Dressing Moderate Assist   Independent    LB Dressing Dependent   Moderate Assistance    Bathing Maximal Assist  Moderate Assistance    Toileting Maximal Assist   Bed pan  Moderate Assist    Bed Mobility  Supine to long sit in bed Max A   Max A rolling with use of rail  Supine to sit: Minimal Assist   Sit to supine: Minimal Assist    Functional Transfers NT    Moderate Assistance    Functional Mobility NT   Moderate Assist    Balance Sitting:     Static:  Max A long sit     Dynamic:NT  Standing: NT     Activity Tolerance Poor-  Pt had little tolerance for movement in bed   Fair to assist with self care tasks   Visual/  Perceptual Glasses: no                Hand Dominance R   AROM (PROM) Strength Additional Info:    RUE  WFL grossly 4-/5 good  and wfl FMC/dexterity noted during ADL tasks       LUE WFL grossly at shld  Proximal not tested due to splint Not tested  Not tested      Hearing: [facility]   Sensation:  No c/o numbness or tingling   Tone: WFL   Edema: Fingers in left hand- pt instructed in finger flexion/ extension and pillow placed for elevation     Comments: Upon arrival patient supine in bed and willing to attempt assessment with much encouragement. At end of session, patient supine in bed  with call light and phone within reach, all lines and tubes intact. Overall patient demonstrated  decreased independence and safety during completion of ADL/functional transfer/mobility tasks.   Pt would benefit from continued skilled OT to increase safety and independence with completion of ADL/IADL tasks for functional independence and quality of life. Rehab Potential: Fair for established goals     Patient / Family Goal: none stated       Patient and/or family were instructed on functional diagnosis, prognosis/goals and OT plan of care. Demonstrated fair understanding. Eval Complexity: Low    Time In: 16:40  Time Out: 16:55  Total Treatment Time: 0    Min Units   OT Eval Low 27048    1   OT Eval Medium 94710      OT Eval High 64787      OT Re-Eval [id number]       Therapeutic Ex 97171       Therapeutic Activities 46369       ADL/Self Care 33289       Orthotic Management 51159       Manual 15343     Neuro Re-Ed 09070       Non-Billable Time          Evaluation Time additionally includes thorough review of current medical information, gathering information on past medical history/social history and prior level of function, interpretation of standardized testing/informal observation of tasks, assessment of data and development of plan of care and goals.           David Castorena, OTR/L [id number]

## 2022-11-07 NOTE — PROGRESS NOTES
Department of Internal Medicine  Nephrology Attending Progress Note    SUBJECTIVE:  We are following this patient for end-stage renal failure . 11/7/22: pt seen post dialysis.  She states when in the hospital she only needs to dialyze 3 hrs    PROBLEM LIST:    Patient Active Problem List   Diagnosis    Neurologic gait dysfunction    Diabetes mellitus ([address]    Hypertension    Arthritis    Knee problem    Anemia due to stage 3 chronic kidney disease    Primary osteoarthritis of both knees    Moderate protein-calorie malnutrition (HCC)    Pressure injury of sacral region, stage 4 (HCC)    Pressure injury of right hip, stage 3 (HCC)    Left bundle branch block    Pure hypercholesterolemia    Moderate obesity    Failure to thrive in adult    Pressure injury of sacral region, stage 3 (HCC)    Decreased dorsalis pedis pulse    ESRD on hemodialysis ([address]    Closed bicondylar fracture of distal femur, right, initial encounter ([address]    Other fracture of right femur, initial encounter for closed fracture ([address]    Closed fracture of distal ends of left radius and ulna    Closed bicondylar fracture of distal end of right femur ([address]        PAST MEDICAL HISTORY:    Past Medical History:   Diagnosis Date    Anemia in chronic kidney disease     Anxiety and depression     Arthritis     Chronic pain syndrome     COVID-19 05/2020    COVID-19     Decreased dorsalis pedis pulse 10/25/2022    Diabetes mellitus ([address]     Dysphagia, oral phase     ESRD on hemodialysis ([address] 10/25/2022    GERD (gastroesophageal reflux disease)     Hemodialysis patient ([address]     M-W-F    Hyperkalemia     Hypertension     Hypertensive kidney disease with stage 4 chronic kidney disease (HCC)     Insomnia     Kidney stones     MDD (major depressive disorder)     Moderate protein-calorie malnutrition (HCC)     Morbid obesity (HCC)     Muscle weakness (generalized)     Obesity     Pressure injury of sacral region, stage 3 ([address] 10/25/2022    Pressure ulcer of sacral region     Sacral pressure ulcer 08/03/2021    stage 4    Unspecified osteoarthritis, unspecified site     Weakness generalized        MEDS (scheduled):   epoetin derek-epbx  10,000 Units SubCUTAneous Once per day on Mon Wed Fri    sevelamer  1,600 mg Oral TID     midodrine  15 mg Oral Daily    busPIRone  10 mg Oral BID    escitalopram  10 mg Oral Daily    insulin glargine  6 Units SubCUTAneous Nightly    polyethylene glycol  17 g Oral Daily    vitamin D  2,000 Units Oral Daily    sodium chloride flush  5-40 mL IntraVENous 2 times per day    heparin (porcine)  5,000 Units SubCUTAneous 3 times per day    insulin lispro  0-8 Units SubCUTAneous TID     insulin lispro  0-4 Units SubCUTAneous Nightly    gabapentin  100 mg Oral TID       MEDS (infusions):   sodium chloride      dextrose         MEDS (prn):  bisacodyl, magnesium hydroxide, prochlorperazine, traZODone, sodium chloride flush, sodium chloride, ondansetron **OR** ondansetron, polyethylene glycol, acetaminophen **OR** acetaminophen, glucose, dextrose bolus **OR** dextrose bolus, glucagon (rDNA), dextrose, oxyCODONE-acetaminophen **OR** oxyCODONE-acetaminophen, HYDROmorphone **OR** HYDROmorphone    DIET:    ADULT DIET; Regular  ADULT ORAL NUTRITION SUPPLEMENT; Breakfast, Dinner; Low Calorie/High Protein Oral Supplement  ADULT ORAL NUTRITION SUPPLEMENT; Breakfast, Dinner;  Wound Healing Oral Supplement      PHYSICAL EXAM:      Patient Vitals for the past 24 hrs:   BP Temp Temp src Pulse Resp SpO2 Weight   11/07/22 1406 103/87 -- -- 95 -- -- --   11/07/22 1353 (!) 80/36 -- -- 90 -- 100 % --   11/07/22 1132 (!) 101/41 98.1 °F (36.7 °C) Axillary 90 20 98 % --   11/07/22 1031 (!) 116/58 97.1 °F (36.2 °C) -- 84 18 -- 208 lb 8.9 oz (94.6 kg)   11/07/22 1000 (!) 89/41 -- -- 98 -- -- --   11/07/22 0930 (!) 165/91 -- -- 95 -- -- --   11/07/22 0900 (!) 131/22 -- -- 70 -- -- --   11/07/22 0830 (!) 131/6 -- -- -- -- -- --   11/07/22 0800 (!) 131/33 -- -- 75 -- -- -- 11/07/22 0730 (!) 125/35 -- -- 75 -- -- --   11/07/22 0700 (!) 149/57 97.2 °F (36.2 °C) -- 78 18 -- 210 lb 15.7 oz (95.7 kg)   11/07/22 0006 126/62 98.4 °F (36.9 °C) Oral 82 17 -- --   11/06/22 1622 (!) 137/101 -- -- 86 -- 96 % --   11/06/22 1437 (!) 140/50 -- -- 76 -- 95 % --          Intake/Output Summary (Last 24 hours) at 11/7/2022 1427  Last data filed at 11/7/2022 1031  Gross per 24 hour   Intake --   Output 1400 ml   Net -1400 ml         Wt Readings from Last 3 Encounters:   11/07/22 208 lb 8.9 oz (94.6 kg)   11/01/22 192 lb (87.1 kg)   10/25/22 192 lb (87.1 kg)       Constitutional:  Patient in no acute distress  Head: normocephalic, atraumatic  Cardiovascular: S1-S2 no S3 or rub  Respiratory:  Clear upper diminished in the bases  Gastrointestinal: soft, nontender, nondistended  Ext: Right ankle with cast left trace edema, left wrist cast, LUE avf with bruit and thrill  Neuro: Awake alert and oriented  Skin: dry, no rash      DATA:      Recent Labs     11/06/22  0348 11/07/22  0559   WBC 10.1 9.1   HGB 7.3* 7.0*   HCT 23.7* 23.1*   .4* 115.5*    160     Recent Labs     11/06/22  0348 11/07/22  0559    135   K 5.0 5.4*   CL 96* 96*   CO2 29 26   BUN 49* 73*   CREATININE 6.9* 8.5*   LABGLOM 6 5   GLUCOSE 185* 201*   CALCIUM 9.2 8.4*     No results for input(s): ALT in the last 72 hours.     Invalid input(s):  AST,  ALKPHOS,  BILITOT,  BILIDIR    Lab Results   Component Value Date    LABALBU 3.6 11/03/2022    LABALBU 3.0 (L) 10/12/2022    LABALBU 3.2 (L) 09/14/2022       Iron studies:  Lab Results   Component Value Date    FERRITIN 767 05/05/2021    IRON 74 05/05/2021    TIBC 116 (L) 05/05/2021     Vitamin B-12   Date Value Ref Range Status   05/05/2021 895 211 - 946 pg/mL Final     Folate   Date Value Ref Range Status   05/05/2021 >20.0 4.8 - 24.2 ng/mL Final       Bone disease:  Lab Results   Component Value Date    MG 2.3 11/07/2022    PHOS 7.3 (H) 11/07/2022     Vit D, 25-Hydroxy   Date Value Ref Range Status   07/15/2022 45 30 - 100 ng/mL Final     Comment:     <20 ng/mL. ........... Lavanda Candle Deficient  20-30 ng/mL. ......... Lavanda Candle Insufficient   ng/mL. ........ Lavanda Candle Sufficient  >100 ng/mL. .......... Lavanda Candle Toxic       PTH   Date Value Ref Range Status   11/06/2022 466 (H) 15 - 65 pg/mL Final       No components found for: URIC    Lab Results   Component Value Date/Time    COLORU Yellow 07/08/2022 11:04 PM    NITRU Negative 07/08/2022 11:04 PM    GLUCOSEU 100 07/08/2022 11:04 PM    GLUCOSEU NEGATIVE 08/17/2011 10:30 PM    KETUA Negative 07/08/2022 11:04 PM    UROBILINOGEN 0.2 07/08/2022 11:04 PM    BILIRUBINUR Negative 07/08/2022 11:04 PM    BILIRUBINUR NEGATIVE 08/17/2011 10:30 PM       No results found for: LIPIDPAN        IMPRESSION/RECOMMENDATIONS:      ESKD-   Chronic IHD at [address] [place] [address]  PLAN:  1. IHD 11/7/22-pt terminated the treatment at 3hrs-1 hr early  2. Follow Labs    2. Hyperkalemia-  PLAN:  manage with HD-2K+ bath  Follow K+    3. Hypertension with CKD 5/ESKD  Intermittent hypotension with HD  Chronically on midodrine with HD treatments. PLAN:  1. Continue to monitor     4. Anemia of CKD-  HgB below goal 10-12  PLAN:  Will dose ARUN 3 times weekly with dialysis  Follow  CBC    5. Hyperparathyroidism of renal origin -  PO4 7.3   PLAN:  1. Continue On sevelamer 2 tablet 3 times a day with meals  2.  Follow Ca++ and PO4    Electronically signed by Surekha Vo MD on 11/7/2022 at 2:27 PM

## 2022-11-07 NOTE — FLOWSHEET NOTE
Pt signed off tx AMA with 1 hr remaining. Pt completed 3 hrs of HD on a 2K bath with 1.1 L of UF removed safely. 11/07/22 1031   Vital Signs   BP (!) 116/58   Temp 97.1 °F (36.2 °C)   Heart Rate 84   Resp 18   Weight 208 lb 8.9 oz (94.6 kg)   Weight Method Bed scale   Percent Weight Change -1.15   Pain Assessment   Pain Assessment None - Denies Pain   Post-Hemodialysis Assessment   Post-Treatment Procedures Blood returned; Access bleeding time < 10 minutes   Machine Disinfection Process Acid/Vinegar Clean;Heat Disinfect; Exterior Machine Disinfection   Rinseback Volume (ml) 300 ml   Blood Volume Processed (Liters) 66 l/min   Dialyzer Clearance Moderately streaked   Duration of Treatment (minutes) 180 minutes   Hemodialysis Output (ml) 1400 ml   Tolerated Treatment Fair   Patient Response to Treatment Pt signed AMA with 1 hr remaining; 1:1 ineffective; blood returned; needles pulled; post report to [employer]; stable at discharge   Bilateral Breath Sounds Clear   Time Off 1029   Patient Disposition Return to room

## 2022-11-08 ENCOUNTER — HOSPITAL ENCOUNTER (OUTPATIENT)
Dept: WOUND CARE | Age: 72
Discharge: HOME | End: 2022-11-08

## 2022-11-08 LAB
ABO/RH: NORMAL
ANION GAP SERPL CALCULATED.3IONS-SCNC: 11 MMOL/L (ref 7–16)
ANISOCYTOSIS: ABNORMAL
ANTIBODY SCREEN: NORMAL
BASOPHILS ABSOLUTE: 0.03 E9/L (ref 0–0.2)
BASOPHILS RELATIVE PERCENT: 0.3 % (ref 0–2)
BLOOD BANK DISPENSE STATUS: NORMAL
BLOOD BANK PRODUCT CODE: NORMAL
BPU ID: NORMAL
BUN BLDV-MCNC: 39 MG/DL (ref 6–23)
CALCIUM SERPL-MCNC: 8.7 MG/DL (ref 8.6–10.2)
CHLORIDE BLD-SCNC: 96 MMOL/L (ref 98–107)
CO2: 29 MMOL/L (ref 22–29)
CREAT SERPL-MCNC: 4.8 MG/DL (ref 0.5–1)
DESCRIPTION BLOOD BANK: NORMAL
EOSINOPHILS ABSOLUTE: 0.14 E9/L (ref 0.05–0.5)
EOSINOPHILS RELATIVE PERCENT: 1.6 % (ref 0–6)
GFR SERPL CREATININE-BSD FRML MDRD: 9 ML/MIN/1.73
GLUCOSE BLD-MCNC: 261 MG/DL (ref 74–99)
HCT VFR BLD CALC: 21.3 % (ref 34–48)
HCT VFR BLD CALC: 21.6 % (ref 34–48)
HCT VFR BLD CALC: 23.8 % (ref 34–48)
HEMOGLOBIN: 6.5 G/DL (ref 11.5–15.5)
HEMOGLOBIN: 6.5 G/DL (ref 11.5–15.5)
HEMOGLOBIN: 7.5 G/DL (ref 11.5–15.5)
HYPOCHROMIA: ABNORMAL
IMMATURE GRANULOCYTES #: 0.09 E9/L
IMMATURE GRANULOCYTES %: 1 % (ref 0–5)
LYMPHOCYTES ABSOLUTE: 1.18 E9/L (ref 1.5–4)
LYMPHOCYTES RELATIVE PERCENT: 13.7 % (ref 20–42)
MAGNESIUM: 2.1 MG/DL (ref 1.6–2.6)
MCH RBC QN AUTO: 34.6 PG (ref 26–35)
MCHC RBC AUTO-ENTMCNC: 30.5 % (ref 32–34.5)
MCV RBC AUTO: 113.3 FL (ref 80–99.9)
METER GLUCOSE: 209 MG/DL (ref 74–99)
METER GLUCOSE: 223 MG/DL (ref 74–99)
METER GLUCOSE: 241 MG/DL (ref 74–99)
METER GLUCOSE: 243 MG/DL (ref 74–99)
METER GLUCOSE: 247 MG/DL (ref 74–99)
METER GLUCOSE: 256 MG/DL (ref 74–99)
MONOCYTES ABSOLUTE: 0.92 E9/L (ref 0.1–0.95)
MONOCYTES RELATIVE PERCENT: 10.7 % (ref 2–12)
NEUTROPHILS ABSOLUTE: 6.24 E9/L (ref 1.8–7.3)
NEUTROPHILS RELATIVE PERCENT: 72.7 % (ref 43–80)
PDW BLD-RTO: 15.3 FL (ref 11.5–15)
PHOSPHORUS: 4.5 MG/DL (ref 2.5–4.5)
PLATELET # BLD: 182 E9/L (ref 130–450)
PMV BLD AUTO: 11.3 FL (ref 7–12)
POLYCHROMASIA: ABNORMAL
POTASSIUM SERPL-SCNC: 5 MMOL/L (ref 3.5–5)
RBC # BLD: 1.88 E12/L (ref 3.5–5.5)
SODIUM BLD-SCNC: 136 MMOL/L (ref 132–146)
WBC # BLD: 8.6 E9/L (ref 4.5–11.5)

## 2022-11-08 PROCEDURE — P9016 RBC LEUKOCYTES REDUCED: HCPCS

## 2022-11-08 PROCEDURE — 36430 TRANSFUSION BLD/BLD COMPNT: CPT

## 2022-11-08 PROCEDURE — 85014 HEMATOCRIT: CPT

## 2022-11-08 PROCEDURE — 84100 ASSAY OF PHOSPHORUS: CPT

## 2022-11-08 PROCEDURE — 83735 ASSAY OF MAGNESIUM: CPT

## 2022-11-08 PROCEDURE — 97530 THERAPEUTIC ACTIVITIES: CPT

## 2022-11-08 PROCEDURE — 86900 BLOOD TYPING SEROLOGIC ABO: CPT

## 2022-11-08 PROCEDURE — 6370000000 HC RX 637 (ALT 250 FOR IP): Performed by: PHYSICIAN ASSISTANT

## 2022-11-08 PROCEDURE — 86901 BLOOD TYPING SEROLOGIC RH(D): CPT

## 2022-11-08 PROCEDURE — 6360000002 HC RX W HCPCS: Performed by: PHYSICIAN ASSISTANT

## 2022-11-08 PROCEDURE — 1200000000 HC SEMI PRIVATE

## 2022-11-08 PROCEDURE — 85018 HEMOGLOBIN: CPT

## 2022-11-08 PROCEDURE — 2500000003 HC RX 250 WO HCPCS: Performed by: FAMILY MEDICINE

## 2022-11-08 PROCEDURE — 86850 RBC ANTIBODY SCREEN: CPT

## 2022-11-08 PROCEDURE — 6370000000 HC RX 637 (ALT 250 FOR IP): Performed by: FAMILY MEDICINE

## 2022-11-08 PROCEDURE — 36415 COLL VENOUS BLD VENIPUNCTURE: CPT

## 2022-11-08 PROCEDURE — 80048 BASIC METABOLIC PNL TOTAL CA: CPT

## 2022-11-08 PROCEDURE — 85025 COMPLETE CBC W/AUTO DIFF WBC: CPT

## 2022-11-08 PROCEDURE — 82962 GLUCOSE BLOOD TEST: CPT

## 2022-11-08 PROCEDURE — 6370000000 HC RX 637 (ALT 250 FOR IP): Performed by: NURSE PRACTITIONER

## 2022-11-08 PROCEDURE — 2580000003 HC RX 258: Performed by: PHYSICIAN ASSISTANT

## 2022-11-08 PROCEDURE — 86923 COMPATIBILITY TEST ELECTRIC: CPT

## 2022-11-08 RX ORDER — INSULIN LISPRO 100 [IU]/ML
0-4 INJECTION, SOLUTION INTRAVENOUS; SUBCUTANEOUS NIGHTLY
DISCHARGE
Start: 2022-11-08

## 2022-11-08 RX ORDER — PROCHLORPERAZINE MALEATE 5 MG/1
5 TABLET ORAL EVERY 6 HOURS PRN
Qty: 120 TABLET | Refills: 3 | DISCHARGE
Start: 2022-11-08

## 2022-11-08 RX ORDER — INSULIN LISPRO 100 [IU]/ML
0-8 INJECTION, SOLUTION INTRAVENOUS; SUBCUTANEOUS
DISCHARGE
Start: 2022-11-08

## 2022-11-08 RX ORDER — BUSPIRONE HYDROCHLORIDE 10 MG/1
10 TABLET ORAL 2 TIMES DAILY
DISCHARGE
Start: 2022-11-08

## 2022-11-08 RX ORDER — SODIUM CHLORIDE 9 MG/ML
INJECTION, SOLUTION INTRAVENOUS PRN
Status: DISCONTINUED | OUTPATIENT
Start: 2022-11-08 | End: 2022-11-10 | Stop reason: HOSPADM

## 2022-11-08 RX ORDER — HEPARIN SODIUM 10000 [USP'U]/ML
5000 INJECTION, SOLUTION INTRAVENOUS; SUBCUTANEOUS EVERY 8 HOURS SCHEDULED
DISCHARGE
Start: 2022-11-08

## 2022-11-08 RX ADMIN — OXYCODONE AND ACETAMINOPHEN 2 TABLET: 5; 325 TABLET ORAL at 20:53

## 2022-11-08 RX ADMIN — POLYETHYLENE GLYCOL 3350 17 G: 17 POWDER, FOR SOLUTION ORAL at 11:15

## 2022-11-08 RX ADMIN — BISACODYL 10 MG: 5 TABLET, COATED ORAL at 03:10

## 2022-11-08 RX ADMIN — SEVELAMER CARBONATE 1600 MG: 800 TABLET, FILM COATED ORAL at 17:18

## 2022-11-08 RX ADMIN — MAGNESIUM HYDROXIDE 30 ML: 400 SUSPENSION ORAL at 20:54

## 2022-11-08 RX ADMIN — SEVELAMER CARBONATE 1600 MG: 800 TABLET, FILM COATED ORAL at 13:03

## 2022-11-08 RX ADMIN — Medication 10 ML: at 11:14

## 2022-11-08 RX ADMIN — OXYCODONE AND ACETAMINOPHEN 2 TABLET: 5; 325 TABLET ORAL at 15:00

## 2022-11-08 RX ADMIN — INSULIN GLARGINE 6 UNITS: 100 INJECTION, SOLUTION SUBCUTANEOUS at 20:57

## 2022-11-08 RX ADMIN — Medication 10 ML: at 20:56

## 2022-11-08 RX ADMIN — HEPARIN SODIUM 5000 UNITS: 10000 INJECTION INTRAVENOUS; SUBCUTANEOUS at 20:56

## 2022-11-08 RX ADMIN — INSULIN LISPRO 2 UNITS: 100 INJECTION, SOLUTION INTRAVENOUS; SUBCUTANEOUS at 17:21

## 2022-11-08 RX ADMIN — HEPARIN SODIUM 5000 UNITS: 10000 INJECTION INTRAVENOUS; SUBCUTANEOUS at 17:16

## 2022-11-08 RX ADMIN — GABAPENTIN 100 MG: 100 CAPSULE ORAL at 20:53

## 2022-11-08 RX ADMIN — MIDODRINE HYDROCHLORIDE 15 MG: 5 TABLET ORAL at 12:07

## 2022-11-08 RX ADMIN — TRAZODONE HYDROCHLORIDE 25 MG: 50 TABLET ORAL at 20:53

## 2022-11-08 RX ADMIN — ACETIC ACID 1000 ML: 250 IRRIGANT IRRIGATION at 11:14

## 2022-11-08 RX ADMIN — Medication 2000 UNITS: at 11:14

## 2022-11-08 RX ADMIN — INSULIN LISPRO 2 UNITS: 100 INJECTION, SOLUTION INTRAVENOUS; SUBCUTANEOUS at 11:46

## 2022-11-08 RX ADMIN — GABAPENTIN 100 MG: 100 CAPSULE ORAL at 17:18

## 2022-11-08 RX ADMIN — ESCITALOPRAM OXALATE 10 MG: 10 TABLET ORAL at 11:13

## 2022-11-08 RX ADMIN — POLYETHYLENE GLYCOL 3350 17 G: 17 POWDER, FOR SOLUTION ORAL at 03:10

## 2022-11-08 RX ADMIN — BUSPIRONE HYDROCHLORIDE 10 MG: 10 TABLET ORAL at 20:53

## 2022-11-08 RX ADMIN — GABAPENTIN 100 MG: 100 CAPSULE ORAL at 11:13

## 2022-11-08 RX ADMIN — BUSPIRONE HYDROCHLORIDE 10 MG: 10 TABLET ORAL at 11:13

## 2022-11-08 RX ADMIN — HYDROMORPHONE HYDROCHLORIDE 0.5 MG: 0.5 INJECTION, SOLUTION INTRAMUSCULAR; INTRAVENOUS; SUBCUTANEOUS at 03:03

## 2022-11-08 ASSESSMENT — PAIN DESCRIPTION - ONSET: ONSET: ON-GOING

## 2022-11-08 ASSESSMENT — PAIN DESCRIPTION - DESCRIPTORS
DESCRIPTORS: PRESSURE
DESCRIPTORS: ACHING
DESCRIPTORS: ACHING

## 2022-11-08 ASSESSMENT — PAIN SCALES - GENERAL
PAINLEVEL_OUTOF10: 0
PAINLEVEL_OUTOF10: 1
PAINLEVEL_OUTOF10: 8
PAINLEVEL_OUTOF10: 3
PAINLEVEL_OUTOF10: 8
PAINLEVEL_OUTOF10: 8

## 2022-11-08 ASSESSMENT — PAIN DESCRIPTION - LOCATION
LOCATION: LEG;ARM
LOCATION: BUTTOCKS
LOCATION: LEG;ARM

## 2022-11-08 ASSESSMENT — PAIN - FUNCTIONAL ASSESSMENT
PAIN_FUNCTIONAL_ASSESSMENT: ACTIVITIES ARE NOT PREVENTED
PAIN_FUNCTIONAL_ASSESSMENT: PREVENTS OR INTERFERES WITH ALL ACTIVE AND SOME PASSIVE ACTIVITIES
PAIN_FUNCTIONAL_ASSESSMENT: PREVENTS OR INTERFERES WITH ALL ACTIVE AND SOME PASSIVE ACTIVITIES

## 2022-11-08 ASSESSMENT — PAIN DESCRIPTION - PAIN TYPE: TYPE: ACUTE PAIN

## 2022-11-08 ASSESSMENT — PAIN DESCRIPTION - ORIENTATION
ORIENTATION: LEFT;RIGHT
ORIENTATION: RIGHT;LEFT

## 2022-11-08 ASSESSMENT — PAIN DESCRIPTION - FREQUENCY: FREQUENCY: CONTINUOUS

## 2022-11-08 NOTE — PROGRESS NOTES
Department of Internal Medicine  Nephrology Attending Progress Note    SUBJECTIVE:  We are following this patient for end-stage renal failure .      11/8/22: pt no new complaints today, clearer mentally than yesterday, awaiting PRBC    PROBLEM LIST:    Patient Active Problem List   Diagnosis    Neurologic gait dysfunction    Diabetes mellitus ([address]    Hypertension    Arthritis    Knee problem    Anemia due to stage 3 chronic kidney disease    Primary osteoarthritis of both knees    Moderate protein-calorie malnutrition (HCC)    Pressure injury of sacral region, stage 4 (HCC)    Pressure injury of right hip, stage 3 (HCC)    Left bundle branch block    Pure hypercholesterolemia    Moderate obesity    Failure to thrive in adult    Pressure injury of sacral region, stage 3 (HCC)    Decreased dorsalis pedis pulse    ESRD on hemodialysis ([address]    Closed bicondylar fracture of distal femur, right, initial encounter ([address]    Other fracture of right femur, initial encounter for closed fracture ([address]    Closed fracture of distal ends of left radius and ulna    Closed bicondylar fracture of distal end of right femur ([address]        PAST MEDICAL HISTORY:    Past Medical History:   Diagnosis Date    Anemia in chronic kidney disease     Anxiety and depression     Arthritis     Chronic pain syndrome     COVID-19 05/2020    COVID-19     Decreased dorsalis pedis pulse 10/25/2022    Diabetes mellitus ([address]     Dysphagia, oral phase     ESRD on hemodialysis ([address] 10/25/2022    GERD (gastroesophageal reflux disease)     Hemodialysis patient ([address]     M-W-F    Hyperkalemia     Hypertension     Hypertensive kidney disease with stage 4 chronic kidney disease (HCC)     Insomnia     Kidney stones     MDD (major depressive disorder)     Moderate protein-calorie malnutrition (HCC)     Morbid obesity (HCC)     Muscle weakness (generalized)     Obesity     Pressure injury of sacral region, stage 3 ([address] 10/25/2022    Pressure ulcer of sacral region     Sacral pressure ulcer 08/03/2021    stage 4    Unspecified osteoarthritis, unspecified site     Weakness generalized        MEDS (scheduled):   acetic acid  1,000 mL Irrigation Daily    epoetin derek-epbx  10,000 Units SubCUTAneous Once per day on Mon Wed Fri    sevelamer  1,600 mg Oral TID WC    midodrine  15 mg Oral Daily    busPIRone  10 mg Oral BID    escitalopram  10 mg Oral Daily    insulin glargine  6 Units SubCUTAneous Nightly    polyethylene glycol  17 g Oral Daily    vitamin D  2,000 Units Oral Daily    sodium chloride flush  5-40 mL IntraVENous 2 times per day    heparin (porcine)  5,000 Units SubCUTAneous 3 times per day    insulin lispro  0-8 Units SubCUTAneous TID WC    insulin lispro  0-4 Units SubCUTAneous Nightly    gabapentin  100 mg Oral TID       MEDS (infusions):   sodium chloride      sodium chloride      dextrose         MEDS (prn):  sodium chloride, bisacodyl, magnesium hydroxide, prochlorperazine, traZODone, sodium chloride flush, sodium chloride, ondansetron **OR** ondansetron, polyethylene glycol, acetaminophen **OR** acetaminophen, glucose, dextrose bolus **OR** dextrose bolus, glucagon (rDNA), dextrose, oxyCODONE-acetaminophen **OR** oxyCODONE-acetaminophen, HYDROmorphone **OR** HYDROmorphone    DIET:    ADULT DIET; Regular  ADULT ORAL NUTRITION SUPPLEMENT; Breakfast, Dinner; Low Calorie/High Protein Oral Supplement  ADULT ORAL NUTRITION SUPPLEMENT; Breakfast, Dinner;  Wound Healing Oral Supplement      PHYSICAL EXAM:      Patient Vitals for the past 24 hrs:   BP Temp Temp src Pulse Resp SpO2 Weight   11/08/22 1215 (!) 126/59 97.3 °F (36.3 °C) -- 74 16 98 % --   11/08/22 1148 (!) 101/54 98.2 °F (36.8 °C) -- 79 14 93 % --   11/08/22 0815 (!) 155/63 98.1 °F (36.7 °C) Oral 82 16 99 % --   11/08/22 0333 -- -- -- -- 14 -- --   11/08/22 0303 -- -- -- -- 16 -- --   11/08/22 0032 -- -- -- -- -- -- 213 lb 3 oz (96.7 kg)   11/07/22 2345 (!) 93/43 98.1 °F (36.7 °C) Oral 90 16 98 % -- 11/07/22 1406 103/87 98.2 °F (36.8 °C) Oral 95 20 99 % --   11/07/22 1353 (!) 80/36 -- -- 90 -- 100 % --        No intake or output data in the 24 hours ending 11/08/22 1340        Wt Readings from Last 3 Encounters:   11/08/22 213 lb 3 oz (96.7 kg)   11/01/22 192 lb (87.1 kg)   10/25/22 192 lb (87.1 kg)       Constitutional:  Patient in no acute distress  Head: normocephalic, atraumatic  Cardiovascular: S1-S2 no S3 or rub  Respiratory:  Clear upper diminished in the bases  Gastrointestinal: soft, nontender, nondistended  Ext: Right ankle with cast left trace edema, left wrist cast, LUE avf with bruit and thrill  Neuro: Awake alert and oriented  Skin: dry, no rash      DATA:      Recent Labs     11/06/22  0348 11/07/22  0559 11/08/22  0252 11/08/22  0530   WBC 10.1 9.1 8.6  --    HGB 7.3* 7.0* 6.5* 6.5*   HCT 23.7* 23.1* 21.3* 21.6*   .4* 115.5* 113.3*  --     160 182  --      Recent Labs     11/06/22 0348 11/07/22  0559 11/08/22  0252    135 136   K 5.0 5.4* 5.0   CL 96* 96* 96*   CO2 29 26 29   BUN 49* 73* 39*   CREATININE 6.9* 8.5* 4.8*   LABGLOM 6 5 9   GLUCOSE 185* 201* 261*   CALCIUM 9.2 8.4* 8.7     No results for input(s): ALT in the last 72 hours. Invalid input(s):  AST,  ALKPHOS,  BILITOT,  BILIDIR    Lab Results   Component Value Date    LABALBU 3.6 11/03/2022    LABALBU 3.0 (L) 10/12/2022    LABALBU 3.2 (L) 09/14/2022       Iron studies:  Lab Results   Component Value Date    FERRITIN 767 05/05/2021    IRON 74 05/05/2021    TIBC 116 (L) 05/05/2021     Vitamin B-12   Date Value Ref Range Status   05/05/2021 895 211 - 946 pg/mL Final     Folate   Date Value Ref Range Status   05/05/2021 >20.0 4.8 - 24.2 ng/mL Final       Bone disease:  Lab Results   Component Value Date    MG 2.1 11/08/2022    PHOS 4.5 11/08/2022     Vit D, 25-Hydroxy   Date Value Ref Range Status   07/15/2022 45 30 - 100 ng/mL Final     Comment:     <20 ng/mL. ........... Denita Medina Deficient  20-30 ng/mL.......... Karlie Luther Insufficient   ng/mL. ........ Karlie Luther Sufficient  >100 ng/mL. .......... Karlie Luther Toxic       PTH   Date Value Ref Range Status   11/06/2022 466 (H) 15 - 65 pg/mL Final       No components found for: URIC    Lab Results   Component Value Date/Time    COLORU Yellow 07/08/2022 11:04 PM    NITRU Negative 07/08/2022 11:04 PM    GLUCOSEU 100 07/08/2022 11:04 PM    GLUCOSEU NEGATIVE 08/17/2011 10:30 PM    KETUA Negative 07/08/2022 11:04 PM    UROBILINOGEN 0.2 07/08/2022 11:04 PM    BILIRUBINUR Negative 07/08/2022 11:04 PM    BILIRUBINUR NEGATIVE 08/17/2011 10:30 PM       No results found for: LIPIDPAN        IMPRESSION/RECOMMENDATIONS:      ESKD-   Chronic IHD at [address] [place] [address]  PLAN:  1. IHD 11/9/22-if not D/Abhijeet  2. Follow Labs    2. Hyperkalemia-  PLAN:  manage with HD-2K+ bath  Follow K+    3. Hypertension with CKD 5/ESKD  Intermittent hypotension with HD  Chronically on midodrine with HD treatments. PLAN:  1. Continue to monitor     4. Anemia of CKD-  HgB below goal 10-12  PLAN:  Will dose ARUN 3 times weekly with dialysis  Follow  CBC  For PRBC for hgB 6.5    5. Hyperparathyroidism of renal origin -  PO4 7.3-->4.5   PLAN:  1. Continue On sevelamer 2 tablet 3 times a day with meals  2.  Follow Ca++ and PO4    Electronically signed by Parish Onofre MD on 11/8/2022 at 1:40 PM

## 2022-11-08 NOTE — FLOWSHEET NOTE
Inpatient Wound Care    Admit Date: 11/3/2022  8:16 PM    Reason for consult:  coccyx wound    Significant history:  right femur fracture    Wound history:  outpatient wound care center follows    Findings:     11/08/22 1410   Wound 10/25/22 Coccyx #1   Date First Assessed/Time First Assessed: 10/25/22 1051   Present on Hospital Admission: Yes  Wound Approximate Age at First Assessment (Weeks): (c)   Primary Wound Type: Pressure Injury  Location: Coccyx  Wound Description (Comments): #1   Wound Image    Wound Etiology Pressure Stage 4   Dressing Status New dressing applied   Wound Cleansed Irrigated with saline   Dressing/Treatment   (acetic acid, kerlix, mepilex)   Drainage Amount Moderate   Drainage Description Sanguinous   Odor Mild   Carmen-wound Assessment Intact   Margins Defined edges     **Informed Consent**    The patient has given verbal consent to have photos taken of coccyx and inserted into their chart as part of their permanent medical record for purposes of documentation, treatment management and/or medical review. All Images taken on 11/8/22 of patient name: Bismark Garay were transmitted and stored on secured [employer] located within [facility] by a registered [employer] Mobile Application Device. Impression:  stage 4 pressure injury    Interventions in place:  dressing, every two hour turns, low air loss bed    Plan: Pack wound with kerlix saturated with acetic acid cover with mepilex. Site: coccyx: clean with normal saline, apply kerlix saturated with acetic acid cover with mepilex. Frequency daily.        Monet Cee RN 11/8/2022 2:20 PM

## 2022-11-08 NOTE — CARE COORDINATION
Transition of Care-Discharge plan is Demi, patient accepted, precert submitted the morning of 11/8/22. Plan for blood transfusion today, monitoring Hgb closely. [facility] brought her equipment, however unable to place, will meet at facility and place smaller brace. Ambulance form/envelope and CIELO completed.     Pantera WOODN, RN  [facility]

## 2022-11-08 NOTE — PROGRESS NOTES
Occupational Therapy  OT BEDSIDE TREATMENT NOTE      Date:2022  Patient Name: Inocente Reis  MRN: [id number]  : 1950  Room: [address] [place] [address]      Evaluating OT: WESLEY Miranda/L [id number]       Referring Evens Ruiz MD    Specific Provider Orders/Date: 2022 OT eval and treat        Diagnosis:  Closed bicondylar fracture of distal femur    Surgery: 11-3-2022 Right distal femur fracture open reduction and internal fixation     Left distal radius fracture open reduction and internal fixation    Pertinent Medical History:  Anemia in chronic kidney disease, Anxiety and depression, Arthritis, Chronic pain syndrome, COVID-19, COVID-19, Decreased dorsalis pedis pulse, Diabetes mellitus ([address] Dysphagia, oral phase, ESRD on hemodialysis ([address] GERD (gastroesophageal reflux disease), Hemodialysis patient ([address] Hyperkalemia, Hypertension, Hypertensive kidney disease with stage 4 chronic kidney disease ([address] Insomnia, Kidney stones, MDD (major depressive disorder), Moderate protein-calorie malnutrition ([address] Morbid obesity ([address] Muscle weakness (generalized), Obesity, Pressure injury of sacral region, stage 3 ([address] Pressure ulcer of sacral region, Sacral pressure ulcer, Unspecified osteoarthritis, unspecified site, and Weakness generalized.      Past Medical History        Past Medical History:   Diagnosis Date    Anemia in chronic kidney disease      Anxiety and depression      Arthritis      Chronic pain syndrome      COVID-19 2020    COVID-19      Decreased dorsalis pedis pulse 10/25/2022    Diabetes mellitus ([address]      Dysphagia, oral phase      ESRD on hemodialysis ([address] 10/25/2022    GERD (gastroesophageal reflux disease)      Hemodialysis patient ([address]       M-W-F    Hyperkalemia      Hypertension      Hypertensive kidney disease with stage 4 chronic kidney disease (HCC)      Insomnia      Kidney stones      MDD (major depressive disorder)      Moderate protein-calorie malnutrition ([address] Morbid obesity (HCC)      Muscle weakness (generalized)      Obesity      Pressure injury of sacral region, stage 3 ([address] 10/25/2022    Pressure ulcer of sacral region      Sacral pressure ulcer 08/03/2021     stage 4    Unspecified osteoarthritis, unspecified site      Weakness generalized              Past Surgical History         Past Surgical History:   Procedure Laterality Date    ABDOMEN SURGERY N/A 5/4/2020     SACRAL WOUND DEBRIDEMENT CALL   WITH TIME AVAIL AM performed by Richard Harris MD at [address]    x3    CHOLECYSTECTOMY   3/31/16     Laparoscopic-Dr. Farrukh Duque   2011      for kidney stones    DIALYSIS FISTULA CREATION Left 8/5/2021     INSERTION FISTULA LEFT ARM performed by Kay Johnson MD at [place] [address]. Right 11/18/2021     REVISION AV FISTULA LEFT ARM performed by Kay Johnson MD at [place] [address] Right 11/4/2022     RIGHT DISTAL FEMUR OPEN REDUCTION INTERNAL FIXATION ++SYNTHES++ performed by Mariel Hudson MD at [address] [place] [address]      right     UPPER GASTROINTESTINAL ENDOSCOPY   2.18.15     Dr. Elizabeth Maxwell Findings: Mild Gastrits and Duodenitis, 2cm Hiatal Hernia    UPPER GASTROINTESTINAL ENDOSCOPY N/A 5/8/2020     EGD CONTROL HEMORRHAGE performed by Richard Harris MD at [address] [place] [address] N/A 5/22/2020     EGD BEDSIDE performed by Eron Brito MD at [place] [address] 5/6/2021     INSERTION TUNNELED DIALYSIS CATHETER performed by Kay Johnson MD at [facility] Left 11/4/2022     LEFT DISTAL RADIUS OPEN REDUCTION INTERNAL FIXATION    ++SYNTHES++ performed by aMriel Hudson MD at [facility] OR          Precautions:  Fall Risk, NWB RLE with hinged knee brace locked in extension, Forearm WB on LUE  Pressure ulcers in sacral region      Assessment of current deficits    [] Functional mobility            [x]ADLs [x] Strength                  [x]Cognition    [x] Functional transfers          [x] IADLs         [x] Safety Awareness   [x]Endurance    [x] Fine Coordination                         [x] Balance      [] Vision/perception   [x]Sensation      []Gross Motor Coordination             [] ROM           [] Delirium                   [] Motor Control      OT PLAN OF CARE   OT POC based on physician orders, patient diagnosis and results of clinical assessment     Frequency/Duration 2-5 days/wk for 2 weeks PRN   Specific OT Treatment Interventions to include:   * Instruction/training on adapted ADL techniques and AE recommendations to increase functional independence within precautions       * Training on energy conservation strategies, correct breathing pattern and techniques to improve independence/tolerance for self-care routine  * Functional transfer/mobility training/DME recommendations for increased independence, safety, and fall prevention  * Patient/Family education to increase follow through with safety techniques and functional independence  * Recommendation of environmental modifications for increased safety with functional transfers/mobility and ADLs  * Therapeutic exercise to improve motor endurance, ROM, and functional strength for ADLs/functional transfers  * Therapeutic activities to facilitate/challenge dynamic balance, stand tolerance for increased safety and independence with ADLs  * Therapeutic activities to facilitate gross/fine motor skills for increased independence with ADLs     Recommended Adaptive Equipment:  TBD      Home Living: Pt lives with her son  in a 2 story home with ramped entrance   Stair glide to second floor    Bathroom setup: half bath on first floor, full on second   Equipment owned: w/c for community use, FWW in home     Prior Level of Function: Pt reports to have spent the past three years in a nursing home and having only been home for the past two weeks prior to her fall.  She was currently sponge bathing and had assist from son who worked with IADLs. Driving: no  Occupation: no     Pain Level: pt indicated pain in L knee and sacral region with movement;  Cognition: A&O: 3/3; Follows 1-2 step directions                            Functional Assessment:  AM-PAC Daily Activity Raw Score: 11/24    Initial Eval Status  Date: 11/7/2022 Treatment Status  Date: 11/8/22 STGs = LTGs  Time frame: 10-14 days   Feeding Minimal Assist       Grooming Moderate Assist    Independent    UB Dressing Moderate Assist    Independent    LB Dressing Dependent  dependent  Moderate Assistance    Bathing Maximal Assist   Moderate Assistance    Toileting Maximal Assist   Bed taylor Max A  Moderate Assist    Bed Mobility  Supine to long sit in bed Max A   Max A rolling with use of rail Supine to sit dependent x2  Supine to sit: Minimal Assist   Sit to supine: Minimal Assist    Functional Transfers NT  Sit <> stand dependent x2, posterior lean and resistive, unable to achieve full stand   Moderate Assistance    Functional Mobility NT    Moderate Assist    Balance Sitting:     Static:  Max A long sit     Dynamic:NT  Standing: NT Sitting min A  Standing dependent x2      Activity Tolerance Poor-  Pt had little tolerance for movement in bed  poor  Fair to assist with self care tasks     Comments:  patient cleared with nursing and agreeable to therapy. Patient limited by fear and pain. Patient In bed with call light in reach and alarm on. Education/treatment: ADL and functional transfer/activity performed to increase safety and independence during self care tasks. Education provided on safety awareness, adl reeducation, functional transfer training    Pt has made progress towards set goals.      Time In: 10:25  Time Out: 10:45     Min Units   Therapeutic Ex 50913     Therapeutic Activities 57708 20 1   ADL/Self Care 87603     Orthotic Management 65125     Neuro Re-Ed 38315     Non-Billable Time     TOTAL TIMED TREATMENT [address]

## 2022-11-08 NOTE — DISCHARGE INSTR - COC
Continuity of Care Form    Patient Name: Vick Mcgowan   :  1950  MRN:  [id number]    Admit date:  11/3/2022  Discharge date:  ***    Code Status Order: DNR-CCA   Advance Directives:   [facility] Documentation       Date/Time Healthcare Directive Type of Healthcare Directive Copy in [facility] [address] Agent's Name Healthcare Agent's Phone Number    22 1413 Yes, patient has an advance directive for healthcare treatment -- Yes, copy in chart -- -- --            Admitting Physician:  Jamil Christian MD  PCP: Jamil Christian MD    Discharging Nurse: [facility] Unit/Room#: 6434/8177-V  Discharging Unit Phone Number: ***    Emergency Contact:   Extended Emergency Contact Information  Primary Emergency Contact: Jonhson Mcguire  Address: [address] Phone: [phone]  Mobile Phone: [phone]  Relation: Child   needed? No  Secondary Emergency Contact: St[facility] Phone: [phone]  Mobile Phone: [phone]  Relation: Child   needed? No    Past Surgical History:  Past Surgical History:   Procedure Laterality Date    ABDOMEN SURGERY N/A 2020    SACRAL WOUND DEBRIDEMENT CALL   WITH TIME AVAIL AM performed by Malena Flores MD at [address] [place]    CHOLECYSTECTOMY  3/31/16    Laparoscopic-Dr. Dulce Maria Talavera       for kidney stones    DIALYSIS FISTULA CREATION Left 2021    INSERTION FISTULA LEFT ARM performed by Siena Carbajal MD at [address] Right 2021    REVISION AV FISTULA LEFT ARM performed by Siena Carbajal MD at [place] [address] Right 2022    RIGHT DISTAL FEMUR OPEN REDUCTION INTERNAL FIXATION ++SYNTHES++ performed by Elmer Morrison MD at [place] [address]     right     UPPER GASTROINTESTINAL ENDOSCOPY  2.18.15    Dr. Star Smith, [facility] Findings: Mild Gastrits and Duodenitis, 2cm Hiatal Hernia    UPPER GASTROINTESTINAL ENDOSCOPY N/A 5/8/2020    EGD CONTROL HEMORRHAGE performed by Ирина Grayson MD at [address] N/A 5/22/2020    EGD BEDSIDE performed by Manas Abraham MD at [place] [address] 5/6/2021    INSERTION TUNNELED DIALYSIS CATHETER performed by Griffin Dodge MD at [facility] 11/4/2022    LEFT DISTAL RADIUS OPEN REDUCTION INTERNAL FIXATION    ++SYNTHES++ performed by Barbara Mcconnell MD at [facility]       Immunization History:   Immunization History   Administered Date(s) Administered    COVID-19, PFIZER PURPLE top, DILUTE for use, (age 15 y+), 30mcg/0.3mL 01/05/2021, 01/26/2021       Active Problems:  Patient Active Problem List   Diagnosis Code    Neurologic gait dysfunction R26.9    Diabetes mellitus ([address] E11.9    Hypertension I10    Arthritis M19.90    Knee problem M25.9    Anemia due to stage 3 chronic kidney disease N18.30, D63.1    Primary osteoarthritis of both knees M17.0    Moderate protein-calorie malnutrition (HCC) E44.0    Pressure injury of sacral region, stage 4 (HCC) L89.154    Pressure injury of right hip, stage 3 (HCC) L89.213    Left bundle branch block I44.7    Pure hypercholesterolemia E78.00    Moderate obesity E66.8    Failure to thrive in adult R62.7    Pressure injury of sacral region, stage 3 (HCC) L89.153    Decreased dorsalis pedis pulse R09.89    ESRD on hemodialysis ([address] N18.6, Z99.2    Closed bicondylar fracture of distal femur, right, initial encounter ([address] S72.421A, S72.431A    Other fracture of right femur, initial encounter for closed fracture ([address] S72.8X1A    Closed fracture of distal ends of left radius and ulna S52.502A, S52.602A    Closed bicondylar fracture of distal end of right femur ([address] S72.421A, S72.431A       Isolation/Infection:   Isolation            No Isolation          Patient Infection Status Infection Onset Added Last Indicated Last Indicated By Review Planned Expiration Resolved Resolved By    None active    Resolved    COVID-19 (Rule Out) 22 COVID-19, Rapid (Ordered)   22 Rule-Out Test Resulted    COVID-19 (Rule Out) 22 COVID-19, Rapid (Ordered)   22 Rule-Out Test Resulted    COVID-19 (Rule Out) 21 COVID-19 (Ordered)   21 Rule-Out Test Resulted    COVID-19 (Rule Out) 10/09/20 10/09/20 10/09/20 COVID-19 (Ordered)   10/09/20 Rule-Out Test Resulted    COVID-19 (Rule Out) 20 COVID-19 (Ordered)   20     VRE 20 Culture, Wound   10/12/20 Liliam Hernández RN    Enterococcus faecium Wound-Coccyx 20    MDRO (multi-drug resistant organism) 20 Culture, Wound   10/12/20 Liliam Hernández RN    COVID-19 (Rule Out) 20 COVID-19 (Ordered)   20 Rule-Out Test Resulted    DETECTED 2020    COVID-19 (Rule Out) 05/14/20 05/15/20 05/14/20 Covid-19 Ambulatory (Ordered)   20 Rule-Out Test Resulted    Detected 2020    COVID-19 (Rule Out) 20 COVID-19 (Ordered)   20 Rule-Out Test Resulted    DETECTED 2020    C-diff Rule Out 20 CLOSTRIDIUM DIFFICILE EIA (Ordered)   20 Yadira Irby RN    Does not meet criteria    MRSA 20 Culture, Wound   20 Cholo Cogan, RN    Wound buttock 5/3/20    COVID-19 20 COVID-19   20     Detected 2020, 2020, 2020, 5/3/2020    COVID-19 (Rule Out) 20 COVID-19 (Ordered)   20 Rule-Out Test Resulted    DETECTED 5/3/2020            Nurse Assessment:  Last Vital Signs: BP (!) 93/43   Pulse 90   Temp 98.1 °F (36.7 °C) (Oral)   Resp 14   Ht 5' 1\" (1.549 m)   Wt 213 lb 3 oz (96.7 kg)   SpO2 98%   BMI 40.28 kg/m²     Last documented pain score (0-10 scale): Pain Level: 3  Last Weight:   Wt Readings from Last 1 Encounters:   11/08/22 213 lb 3 oz (96.7 kg)     Mental Status:  oriented and alert    IV Access:  - None    Nursing Mobility/ADLs:  Walking   Assisted  Transfer  Assisted  Bathing  Assisted  Dressing  Assisted  Toileting  Assisted  Feeding  Independent  Med [address] [place] [address] Delivery   none    Wound Care Documentation and Therapy:  Wound 12/06/21 Sacrum in fold (Active)   Number of days: 336       Wound 07/09/22 Coccyx (Active)   Number of days: 122       Wound 10/25/22 Coccyx #1 (Active)   Wound Image   11/07/22 1647   Wound Etiology Pressure Stage 4 11/07/22 1647   Dressing Status Clean;Dry; Intact 11/07/22 1410   Wound Cleansed Cleansed with saline 11/07/22 1647   Dressing/Treatment Alginate;Gauze dressing/dressing sponge 11/07/22 0006   Dressing Change Due 11/07/22 11/07/22 1647   Wound Length (cm) 8 cm 11/07/22 1647   Wound Width (cm) 3 cm 11/07/22 1647   Wound Depth (cm) 1.5 cm 11/07/22 1647   Wound Surface Area (cm^2) 24 cm^2 11/07/22 1647   Change in Wound Size % (l*w) -58.1 11/07/22 1647   Wound Volume (cm^3) 36 cm^3 11/07/22 1647   Wound Healing % -69 11/07/22 1647   Post-Procedure Length (cm) 7.1 cm 11/01/22 1542   Post-Procedure Width (cm) 2.7 cm 11/01/22 1542   Post-Procedure Depth (cm) 1.9 cm 11/01/22 1542   Post-Procedure Surface Area (cm^2) 19.17 cm^2 11/01/22 1542   Post-Procedure Volume (cm^3) 36.423 cm^3 11/01/22 1542   Undermining Starts ___ O'Clock 3 11/01/22 1503   Undermining Ends___ O'Clock 6 11/01/22 1503   Undermining Maxium Distance (cm) 1.2 @5 11/01/22 1503   Wound Assessment Pink/red 11/04/22 0412   Drainage Amount Small 11/07/22 1647   Drainage Description Sanguinous;Green 11/07/22 1647   Odor Mild 11/07/22 1647   Carmen-wound Assessment Blanchable erythema; Maceration 11/05/22 0130   Number of days: 13       Incision 11/04/22 Femoral Anterior;Distal;Right (Active)   Dressing Status Clean;Dry; Intact 11/07/22 1410   Dressing/Treatment Xeroform;ABD pad; Ace wrap;Dry dressing;Gauze dressing/dressing sponge; Cast padding 11/04/22 1845   Closure Sutures; Staples 11/04/22 1845   Incision Assessment Other (Comment) 11/07/22 1410   Drainage Amount None 11/04/22 1845   Number of days: 3       Incision 11/04/22 Radial Left (Active)   Dressing Status Clean;Dry; Intact 11/07/22 1410   Dressing/Treatment Ace wrap;Dry dressing;Cast padding;Splint 11/04/22 1845   Closure Sutures 11/04/22 1845   Incision Assessment Other (Comment) 11/07/22 1410   Drainage Amount None 11/04/22 1845   Number of days: 3       Incision 11/18/21 Brachial Anterior; Distal; Left (Active)   Number of days: 354        Elimination:  Continence: Bowel: Yes  Bladder: anuric    Urinary Catheter: None   Colostomy/Ileostomy/Ileal Conduit: No       Date of Last BM: 11/3/22      Intake/Output Summary (Last 24 hours) at 11/8/2022 0802  Last data filed at 11/7/2022 1031  Gross per 24 hour   Intake --   Output 1400 ml   Net -1400 ml     I/O last 3 completed shifts:  In: -   Out: 1400     Safety Concerns: At Risk for Falls    Impairments/Disabilities:      None      Nutrition Therapy:  Current Nutrition Therapy:   - Oral Diet:  General  - Oral Nutrition Supplement:  Low Calorie High Protein  three times a day  - ***    Routes of Feeding: Oral  Liquids: No Restrictions  Daily Fluid Restriction: no  Last Modified Barium Swallow with Video (Video Swallowing Test): not done    Treatments at the Time of Hospital Discharge:   Respiratory Treatments: n/a    Oxygen Therapy:  is not on home oxygen therapy.   Ventilator:    - No ventilator support    Rehab Therapies: Physical Therapy, Occupational Therapy, and Orthotics/Prosthetics  Weight Bearing Status/Restrictions: Non-weight bearing on right leg  Other Medical Equipment (for information only, NOT a DME order):  walker  Other Treatments: ***    Patient's personal belongings (please select all that are sent with patient):  None    RN SIGNATURE:  Electronically signed by Wisam Uribe RN on 11/9/22 at 2:49 PM EST    CASE MANAGEMENT/SOCIAL WORK SECTION    Inpatient Status Date: ***    Readmission Risk Assessment Score:  Readmission Risk              Risk of Unplanned Readmission:  34           Discharging to Facility/ Agency   Name: GAGANDE[facility]  Address: [address], [facility] [place] [address]  Phone:[phone]  Fax:[phone]    Dialysis Facility (if applicable)   Name:  Address:  Dialysis Schedule:  Phone:  Fax:    / signature: Electronically signed by Viviana Pearce RN on 11/8/2022 at 12:28 PM      PHYSICIAN SECTION    Prognosis: Good    Condition at Discharge: Stable    Rehab Potential (if transferring to Rehab): Good    Recommended Labs or Other Treatments After Discharge: cbc cmp    Physician Certification: I certify the above information and transfer of Bismark Garay  is necessary for the continuing treatment of the diagnosis listed and that she requires [facility] for greater 30 days.      Update Admission H&P: No change in H&P    PHYSICIAN SIGNATURE:  Electronically signed by Ramon Moran MD on 11/8/22 at 8:03 AM EST

## 2022-11-08 NOTE — CARE COORDINATION
Transition of Care-Jose received auth-good until 11/10, facility requested patient get dialysis in am here then transfer over to facility-will need negative covid test prior to transfer. Call placed to dialysis-patient is scheduled for 1st treatment in the morning. Patient updated that she was accepted and Narcisa Dimas approved. Facility to call [address] [place] [address] to update that patient will be returning on Friday 11/11/22 for resumption of outpatient dialysis treatments.     Fede Garibay BSN, RN  [facility]

## 2022-11-08 NOTE — PROGRESS NOTES
Call placed to [facility] orthotics for knee hinged brace, order faxed to office.

## 2022-11-08 NOTE — PROGRESS NOTES
Physical Therapy  Facility/Department: [facility]  Physical Therapy Treatment Note  Name: Viv Wilson  : 1950  MRN: [id number]  Date of Service: 2022               Patient Diagnosis(es): The primary encounter diagnosis was Closed bicondylar fracture of right femur, initial encounter ([address] Diagnoses of Closed fracture of distal ends of left radius and ulna, initial encounter and Fall, initial encounter were also pertinent to this visit. Past Medical History:  has a past medical history of Anemia in chronic kidney disease, Anxiety and depression, Arthritis, Chronic pain syndrome, COVID-19, COVID-19, Decreased dorsalis pedis pulse, Diabetes mellitus ([address] Dysphagia, oral phase, ESRD on hemodialysis ([address] GERD (gastroesophageal reflux disease), Hemodialysis patient ([address] Hyperkalemia, Hypertension, Hypertensive kidney disease with stage 4 chronic kidney disease ([address] Insomnia, Kidney stones, MDD (major depressive disorder), Moderate protein-calorie malnutrition ([address] Morbid obesity ([address] Muscle weakness (generalized), Obesity, Pressure injury of sacral region, stage 3 ([address] Pressure ulcer of sacral region, Sacral pressure ulcer, Unspecified osteoarthritis, unspecified site, and Weakness generalized. Past Surgical History:  has a past surgical history that includes Foot surgery ( ); Cystocopy ( );  section (x3); Upper gastrointestinal endoscopy (2.18.15); Cholecystectomy (3/31/16); Abdomen surgery (N/A, 2020); Upper gastrointestinal endoscopy (N/A, 2020); Upper gastrointestinal endoscopy (N/A, 2020); vascular surgery (N/A, 2021); Dialysis fistula creation (Left, 2021); Dialysis fistula creation (Right, 2021); Femur fracture surgery (Right, 2022); and Wrist fracture surgery (Left, 2022).                Evaluating Therapist: Minnie Faulkner PT      Referring Provider:  Alonzo Briceno MD     PT order : PT eval and treat      Room #: 603  DIAGNOSIS: The primary encounter diagnosis was Closed bicondylar fracture of right femur, initial encounter ([address] Diagnoses of Closed fracture of distal ends of left radius and ulna, initial encounter and Fall, initial encounter were also pertinent to this visit. ORIF L distal radius and ORIF R femur 11/4/2022  Additional Pertinent History:recently home after in SNF x 3  years. PRECAUTIONS:  falls, forearm WB L UE, NWB R LE, R knee immobilizer - brace ordered - locked in ext. Social:  Pt lives with  Son  in a  2  floor plan  With stair glide to second floor, ramp   to enter. Prior to admission pt walked with  ww short distances or uses a w/c        Initial Evaluation  Date: 11/7/2022  Treatment  11/8/2022    Short Term/ Long Term   Goals   Was pt agreeable to Eval/treatment? With encouragement  With encouragement      Does pt have pain?  coccyx wound , R LE  R LE pain with bed mobility and sitting EOB , L rib pain     Bed Mobility  Rolling:  max assist  Supine to sit: max assist  for long sit in bed   Sit to supine: mod assist   Scooting:  Max assist in supine  Supine <> sit: Max A of 2  Scooting: Max A of 2 seated to EO:B Mod assist    Transfers Sit to stand:  NT  Sit <> stand: Attempted x 2.  See comments Max assist x 2    Ambulation     NT  NT  TBA           Stair negotiation: ascended and descended NT  NT TBA    LE ROM  Decreased L hip, distally WFL, decreased throughout RLE - knee NT due to immobilizer        LE strength  3-/ 5 L hip, 3+ to 4-/ 5 distally,   R hip 1+/ 5, knee NT, 3-/ 5 ankle    Increase by 1 MMT grade    AM- PAC RAW score   8/ 24 8/24          Pt is alert and able to follow instruction  Balance: fair/poor sitting EOB    Pt performed therapeutic exercise of the following: NT    Patient education/treatment  Pt was educated on NWB status R LE, L LE usage to assist with standing    Patient response to education:   Pt verbalized understanding Pt demonstrated skill Pt requires further education in this area   yes no yes     ASSESSMENT:   Comments: Nurse ok with Rx. Pt assisted to EOB, sat EOB Min/SBA for balance. Standing attempted x 2, much effort required for Pt to barely clear her buttocks off the EOB, no effort by Pt with L LE to assist with standing. Pt unsafe to get to a chair due to lack of effort.  orthotics present, wanted to place R LE hinged brace on Pt while in a chair. Pt unable to get to the chair, orthotic not placed. Pt was left in bed as found with call light in reach    Chair/bed alarm: NA, staff present    Time in 1025   Time out 1045   Total Treatment Time 20 minutes   CPT codes:     Therapeutic activities 37329 20 minutes   Therapeutic exercises 52232 0 minutes       Pt is making minimal progress toward established Physical Therapy goals as per tolerance to sitting EOB today. Continue with physical therapy current plan of care.     Gael Lu PTA   License Number: [id number]

## 2022-11-08 NOTE — PROGRESS NOTES
Subjective: The patient is awake and alert. No problems overnight. Denies chest pain, angina, and dyspnea. Denies abdominal pain. Tolerating diet. No nausea or vomiting. Objective:    BP (!) 93/43   Pulse 90   Temp 98.1 °F (36.7 °C) (Oral)   Resp 14   Ht 5' 1\" (1.549 m)   Wt 213 lb 3 oz (96.7 kg)   SpO2 98%   BMI 40.28 kg/m²     Heart:  RRR, no murmurs, gallops, or rubs.   Lungs:  CTA bilaterally, no wheeze, rales or rhonchi  Abd: bowel sounds present, nontender, nondistended, no masses  Extrem:  No clubbing, cyanosis, or edema    Hemoglobin/Hematocrit:    Lab Results   Component Value Date/Time    HGB 6.5 11/08/2022 02:52 AM    HCT 21.3 11/08/2022 02:52 AM        Assessment:    Patient Active Problem List   Diagnosis    Neurologic gait dysfunction    Diabetes mellitus ([address]    Hypertension    Arthritis    Knee problem    Anemia due to stage 3 chronic kidney disease    Primary osteoarthritis of both knees    Moderate protein-calorie malnutrition (HCC)    Pressure injury of sacral region, stage 4 (HCC)    Pressure injury of right hip, stage 3 (HCC)    Left bundle branch block    Pure hypercholesterolemia    Moderate obesity    Failure to thrive in adult    Pressure injury of sacral region, stage 3 (HCC)    Decreased dorsalis pedis pulse    ESRD on hemodialysis ([address]    Closed bicondylar fracture of distal femur, right, initial encounter ([address]    Other fracture of right femur, initial encounter for closed fracture ([address]    Closed fracture of distal ends of left radius and ulna    Closed bicondylar fracture of distal end of right femur ([address]       Plan:  Transfuse  Check with  regarding hinged brace  D/C in AM?        Nicki Wagoner MD  7:58 AM  11/8/2022

## 2022-11-09 VITALS
WEIGHT: 204.81 LBS | OXYGEN SATURATION: 98 % | SYSTOLIC BLOOD PRESSURE: 136 MMHG | HEART RATE: 75 BPM | BODY MASS INDEX: 38.67 KG/M2 | HEIGHT: 61 IN | RESPIRATION RATE: 16 BRPM | DIASTOLIC BLOOD PRESSURE: 52 MMHG | TEMPERATURE: 98.1 F

## 2022-11-09 LAB
ANION GAP SERPL CALCULATED.3IONS-SCNC: 13 MMOL/L (ref 7–16)
BASOPHILS ABSOLUTE: 0.04 E9/L (ref 0–0.2)
BASOPHILS RELATIVE PERCENT: 0.5 % (ref 0–2)
BUN BLDV-MCNC: 59 MG/DL (ref 6–23)
CALCIUM SERPL-MCNC: 9 MG/DL (ref 8.6–10.2)
CHLORIDE BLD-SCNC: 97 MMOL/L (ref 98–107)
CO2: 28 MMOL/L (ref 22–29)
CREAT SERPL-MCNC: 6.6 MG/DL (ref 0.5–1)
EOSINOPHILS ABSOLUTE: 0.27 E9/L (ref 0.05–0.5)
EOSINOPHILS RELATIVE PERCENT: 3.6 % (ref 0–6)
GFR SERPL CREATININE-BSD FRML MDRD: 6 ML/MIN/1.73
GLUCOSE BLD-MCNC: 201 MG/DL (ref 74–99)
HCT VFR BLD CALC: 23 % (ref 34–48)
HEMOGLOBIN: 7.1 G/DL (ref 11.5–15.5)
IMMATURE GRANULOCYTES #: 0.05 E9/L
IMMATURE GRANULOCYTES %: 0.7 % (ref 0–5)
LYMPHOCYTES ABSOLUTE: 1.05 E9/L (ref 1.5–4)
LYMPHOCYTES RELATIVE PERCENT: 14.1 % (ref 20–42)
MAGNESIUM: 2.4 MG/DL (ref 1.6–2.6)
MCH RBC QN AUTO: 33.2 PG (ref 26–35)
MCHC RBC AUTO-ENTMCNC: 30.9 % (ref 32–34.5)
MCV RBC AUTO: 107.5 FL (ref 80–99.9)
METER GLUCOSE: 186 MG/DL (ref 74–99)
METER GLUCOSE: 203 MG/DL (ref 74–99)
METER GLUCOSE: 259 MG/DL (ref 74–99)
METER GLUCOSE: 349 MG/DL (ref 74–99)
MONOCYTES ABSOLUTE: 0.74 E9/L (ref 0.1–0.95)
MONOCYTES RELATIVE PERCENT: 9.9 % (ref 2–12)
NEUTROPHILS ABSOLUTE: 5.3 E9/L (ref 1.8–7.3)
NEUTROPHILS RELATIVE PERCENT: 71.2 % (ref 43–80)
PDW BLD-RTO: 17.4 FL (ref 11.5–15)
PHOSPHORUS: 6 MG/DL (ref 2.5–4.5)
PLATELET # BLD: 179 E9/L (ref 130–450)
PMV BLD AUTO: 10.9 FL (ref 7–12)
POTASSIUM SERPL-SCNC: 5.3 MMOL/L (ref 3.5–5)
RBC # BLD: 2.14 E12/L (ref 3.5–5.5)
SARS-COV-2, NAAT: NOT DETECTED
SODIUM BLD-SCNC: 138 MMOL/L (ref 132–146)
WBC # BLD: 7.5 E9/L (ref 4.5–11.5)

## 2022-11-09 PROCEDURE — 6370000000 HC RX 637 (ALT 250 FOR IP): Performed by: NURSE PRACTITIONER

## 2022-11-09 PROCEDURE — 87340 HEPATITIS B SURFACE AG IA: CPT

## 2022-11-09 PROCEDURE — 84100 ASSAY OF PHOSPHORUS: CPT

## 2022-11-09 PROCEDURE — 6370000000 HC RX 637 (ALT 250 FOR IP): Performed by: FAMILY MEDICINE

## 2022-11-09 PROCEDURE — 6370000000 HC RX 637 (ALT 250 FOR IP): Performed by: PHYSICIAN ASSISTANT

## 2022-11-09 PROCEDURE — 85025 COMPLETE CBC W/AUTO DIFF WBC: CPT

## 2022-11-09 PROCEDURE — 83735 ASSAY OF MAGNESIUM: CPT

## 2022-11-09 PROCEDURE — 80048 BASIC METABOLIC PNL TOTAL CA: CPT

## 2022-11-09 PROCEDURE — 87635 SARS-COV-2 COVID-19 AMP PRB: CPT

## 2022-11-09 PROCEDURE — 6360000002 HC RX W HCPCS: Performed by: NURSE PRACTITIONER

## 2022-11-09 PROCEDURE — 1200000000 HC SEMI PRIVATE

## 2022-11-09 PROCEDURE — 90935 HEMODIALYSIS ONE EVALUATION: CPT

## 2022-11-09 PROCEDURE — 36415 COLL VENOUS BLD VENIPUNCTURE: CPT

## 2022-11-09 PROCEDURE — 2580000003 HC RX 258: Performed by: PHYSICIAN ASSISTANT

## 2022-11-09 PROCEDURE — 6360000002 HC RX W HCPCS: Performed by: PHYSICIAN ASSISTANT

## 2022-11-09 PROCEDURE — 82962 GLUCOSE BLOOD TEST: CPT

## 2022-11-09 RX ADMIN — OXYCODONE AND ACETAMINOPHEN 2 TABLET: 5; 325 TABLET ORAL at 01:06

## 2022-11-09 RX ADMIN — INSULIN GLARGINE 6 UNITS: 100 INJECTION, SOLUTION SUBCUTANEOUS at 21:23

## 2022-11-09 RX ADMIN — Medication 2000 UNITS: at 13:48

## 2022-11-09 RX ADMIN — INSULIN LISPRO 6 UNITS: 100 INJECTION, SOLUTION INTRAVENOUS; SUBCUTANEOUS at 06:35

## 2022-11-09 RX ADMIN — SEVELAMER CARBONATE 1600 MG: 800 TABLET, FILM COATED ORAL at 16:56

## 2022-11-09 RX ADMIN — BUSPIRONE HYDROCHLORIDE 10 MG: 10 TABLET ORAL at 13:46

## 2022-11-09 RX ADMIN — SEVELAMER CARBONATE 1600 MG: 800 TABLET, FILM COATED ORAL at 05:35

## 2022-11-09 RX ADMIN — POLYETHYLENE GLYCOL 3350 17 G: 17 POWDER, FOR SOLUTION ORAL at 13:48

## 2022-11-09 RX ADMIN — HEPARIN SODIUM 5000 UNITS: 10000 INJECTION INTRAVENOUS; SUBCUTANEOUS at 21:22

## 2022-11-09 RX ADMIN — HEPARIN SODIUM 5000 UNITS: 10000 INJECTION INTRAVENOUS; SUBCUTANEOUS at 05:35

## 2022-11-09 RX ADMIN — BUSPIRONE HYDROCHLORIDE 10 MG: 10 TABLET ORAL at 21:13

## 2022-11-09 RX ADMIN — BISACODYL 10 MG: 5 TABLET, COATED ORAL at 05:35

## 2022-11-09 RX ADMIN — OXYCODONE AND ACETAMINOPHEN 2 TABLET: 5; 325 TABLET ORAL at 04:45

## 2022-11-09 RX ADMIN — EPOETIN ALFA-EPBX 10000 UNITS: 10000 INJECTION, SOLUTION INTRAVENOUS; SUBCUTANEOUS at 13:48

## 2022-11-09 RX ADMIN — OXYCODONE AND ACETAMINOPHEN 2 TABLET: 5; 325 TABLET ORAL at 22:18

## 2022-11-09 RX ADMIN — INSULIN LISPRO 4 UNITS: 100 INJECTION, SOLUTION INTRAVENOUS; SUBCUTANEOUS at 16:58

## 2022-11-09 RX ADMIN — GABAPENTIN 100 MG: 100 CAPSULE ORAL at 13:48

## 2022-11-09 RX ADMIN — GABAPENTIN 100 MG: 100 CAPSULE ORAL at 21:13

## 2022-11-09 RX ADMIN — Medication 10 ML: at 13:53

## 2022-11-09 RX ADMIN — OXYCODONE AND ACETAMINOPHEN 2 TABLET: 5; 325 TABLET ORAL at 13:47

## 2022-11-09 RX ADMIN — OXYCODONE AND ACETAMINOPHEN 2 TABLET: 5; 325 TABLET ORAL at 18:22

## 2022-11-09 RX ADMIN — ESCITALOPRAM OXALATE 10 MG: 10 TABLET ORAL at 13:48

## 2022-11-09 RX ADMIN — SEVELAMER CARBONATE 1600 MG: 800 TABLET, FILM COATED ORAL at 13:47

## 2022-11-09 RX ADMIN — MIDODRINE HYDROCHLORIDE 15 MG: 5 TABLET ORAL at 05:35

## 2022-11-09 RX ADMIN — HEPARIN SODIUM 5000 UNITS: 10000 INJECTION INTRAVENOUS; SUBCUTANEOUS at 14:26

## 2022-11-09 ASSESSMENT — PAIN DESCRIPTION - ORIENTATION
ORIENTATION: RIGHT;LEFT
ORIENTATION: RIGHT;LEFT
ORIENTATION: RIGHT
ORIENTATION: RIGHT;LEFT

## 2022-11-09 ASSESSMENT — PAIN DESCRIPTION - LOCATION
LOCATION: LEG;ARM;RIB CAGE
LOCATION: LEG;KNEE;ARM
LOCATION: ARM;LEG
LOCATION: ARM;KNEE;LEG
LOCATION: LEG

## 2022-11-09 ASSESSMENT — PAIN DESCRIPTION - FREQUENCY
FREQUENCY: CONTINUOUS
FREQUENCY: CONTINUOUS

## 2022-11-09 ASSESSMENT — PAIN DESCRIPTION - DESCRIPTORS
DESCRIPTORS: ACHING

## 2022-11-09 ASSESSMENT — PAIN - FUNCTIONAL ASSESSMENT
PAIN_FUNCTIONAL_ASSESSMENT: PREVENTS OR INTERFERES WITH ALL ACTIVE AND SOME PASSIVE ACTIVITIES

## 2022-11-09 ASSESSMENT — PAIN SCALES - GENERAL
PAINLEVEL_OUTOF10: 7
PAINLEVEL_OUTOF10: 8
PAINLEVEL_OUTOF10: 4
PAINLEVEL_OUTOF10: 8
PAINLEVEL_OUTOF10: 4
PAINLEVEL_OUTOF10: 0

## 2022-11-09 ASSESSMENT — PAIN DESCRIPTION - PAIN TYPE
TYPE: ACUTE PAIN
TYPE: ACUTE PAIN

## 2022-11-09 ASSESSMENT — PAIN DESCRIPTION - ONSET
ONSET: ON-GOING
ONSET: AWAKENED FROM SLEEP

## 2022-11-09 NOTE — FLOWSHEET NOTE
11/09/22 1235   Vital Signs   /74   Temp (!) 96.4 °F (35.8 °C)   Heart Rate 74   Resp 18   Weight 204 lb 12.9 oz (92.9 kg)   Weight Method Bed scale   Percent Weight Change -2.21   Pain Assessment   Pain Assessment None - Denies Pain   Post-Hemodialysis Assessment   Post-Treatment Procedures Blood returned; Access bleeding time < 10 minutes   Machine Disinfection Process Acid/Vinegar Clean;Heat Disinfect; Exterior Machine Disinfection;Machine Absence of Bleach Machine;Verified Absence of Bleach in HCO3 Jug   Blood Volume Processed (Liters) 75.9 l/min   Dialyzer Clearance Clear   Duration of Treatment (minutes) 240 minutes   Hemodialysis Intake (ml) 400 ml   Hemodialysis Output (ml) 2500 ml   NET Removed (ml) 2100   Tolerated Treatment Good   Patient Response to Treatment Dr. Kinsey Spencer visited on dialysis   Bilateral Breath Sounds Diminished   Edema Generalized;Right upper extremity; Left upper extremity;Right lower extremity; Left lower extremity   Edema Generalized +1   RUE Edema +1   LUE Edema +1   LLE Edema +1   Time Off 1223   Patient Disposition Return to room

## 2022-11-09 NOTE — PLAN OF CARE
Problem: Discharge Planning  Goal: Discharge to home or other facility with appropriate resources  Outcome: Adequate for Discharge  Flowsheets (Taken 11/9/2022 0899)  Discharge to home or other facility with appropriate resources: Identify barriers to discharge with patient and caregiver     Problem: Pain  Goal: Verbalizes/displays adequate comfort level or baseline comfort level  Outcome: Adequate for Discharge     Problem: Skin/Tissue Integrity  Goal: Absence of new skin breakdown  Description: 1. Monitor for areas of redness and/or skin breakdown  2. Assess vascular access sites hourly  3. Every 4-6 hours minimum:  Change oxygen saturation probe site  4. Every 4-6 hours:  If on nasal continuous positive airway pressure, respiratory therapy assess nares and determine need for appliance change or resting period.   Outcome: Adequate for Discharge     Problem: ABCDS Injury Assessment  Goal: Absence of physical injury  Outcome: Adequate for Discharge  Flowsheets (Taken 11/9/2022 1249)  Absence of Physical Injury: Implement safety measures based on patient assessment     Problem: Safety - Adult  Goal: Free from fall injury  Outcome: Adequate for Discharge  Flowsheets (Taken 11/9/2022 1249)  Free From Fall Injury: Instruct family/caregiver on patient safety     Problem: Chronic Conditions and Co-morbidities  Goal: Patient's chronic conditions and co-morbidity symptoms are monitored and maintained or improved  Outcome: Adequate for Discharge  Flowsheets (Taken 11/9/2022 0832)  Care Plan - Patient's Chronic Conditions and Co-Morbidity Symptoms are Monitored and Maintained or Improved: Monitor and assess patient's chronic conditions and comorbid symptoms for stability, deterioration, or improvement     Problem: Nutrition Deficit:  Goal: Optimize nutritional status  Outcome: Adequate for Discharge

## 2022-11-09 NOTE — PROGRESS NOTES
Department of Internal Medicine  Nephrology Attending Progress Note    SUBJECTIVE:  We are following this patient for end-stage renal failure .      11/9/22: pt no new complaints today, seen on dialysis, no c/o CP or SOB, pain in the R knee    PROBLEM LIST:    Patient Active Problem List   Diagnosis    Neurologic gait dysfunction    Diabetes mellitus ([address]    Hypertension    Arthritis    Knee problem    Anemia due to stage 3 chronic kidney disease    Primary osteoarthritis of both knees    Moderate protein-calorie malnutrition (HCC)    Pressure injury of sacral region, stage 4 (HCC)    Pressure injury of right hip, stage 3 (HCC)    Left bundle branch block    Pure hypercholesterolemia    Moderate obesity    Failure to thrive in adult    Pressure injury of sacral region, stage 3 (HCC)    Decreased dorsalis pedis pulse    ESRD on hemodialysis ([address]    Closed bicondylar fracture of distal femur, right, initial encounter ([address]    Other fracture of right femur, initial encounter for closed fracture ([address]    Closed fracture of distal ends of left radius and ulna    Closed bicondylar fracture of distal end of right femur ([address]        PAST MEDICAL HISTORY:    Past Medical History:   Diagnosis Date    Anemia in chronic kidney disease     Anxiety and depression     Arthritis     Chronic pain syndrome     COVID-19 05/2020    COVID-19     Decreased dorsalis pedis pulse 10/25/2022    Diabetes mellitus ([address]     Dysphagia, oral phase     ESRD on hemodialysis ([address] 10/25/2022    GERD (gastroesophageal reflux disease)     Hemodialysis patient ([address]     M-W-F    Hyperkalemia     Hypertension     Hypertensive kidney disease with stage 4 chronic kidney disease (HCC)     Insomnia     Kidney stones     MDD (major depressive disorder)     Moderate protein-calorie malnutrition (HCC)     Morbid obesity (HCC)     Muscle weakness (generalized)     Obesity     Pressure injury of sacral region, stage 3 ([address] 10/25/2022    Pressure ulcer of sacral region     Sacral pressure ulcer 08/03/2021    stage 4    Unspecified osteoarthritis, unspecified site     Weakness generalized        MEDS (scheduled):   acetic acid  1,000 mL Irrigation Daily    epoetin derek-epbx  10,000 Units SubCUTAneous Once per day on Mon Wed Fri    sevelamer  1,600 mg Oral TID WC    midodrine  15 mg Oral Daily    busPIRone  10 mg Oral BID    escitalopram  10 mg Oral Daily    insulin glargine  6 Units SubCUTAneous Nightly    polyethylene glycol  17 g Oral Daily    vitamin D  2,000 Units Oral Daily    sodium chloride flush  5-40 mL IntraVENous 2 times per day    heparin (porcine)  5,000 Units SubCUTAneous 3 times per day    insulin lispro  0-8 Units SubCUTAneous TID WC    insulin lispro  0-4 Units SubCUTAneous Nightly    gabapentin  100 mg Oral TID       MEDS (infusions):   sodium chloride      sodium chloride      dextrose         MEDS (prn):  sodium chloride, bisacodyl, magnesium hydroxide, prochlorperazine, traZODone, sodium chloride flush, sodium chloride, ondansetron **OR** ondansetron, polyethylene glycol, acetaminophen **OR** acetaminophen, glucose, dextrose bolus **OR** dextrose bolus, glucagon (rDNA), dextrose, oxyCODONE-acetaminophen **OR** oxyCODONE-acetaminophen, HYDROmorphone **OR** HYDROmorphone    DIET:    ADULT DIET; Regular  ADULT ORAL NUTRITION SUPPLEMENT; Breakfast, Dinner; Low Calorie/High Protein Oral Supplement  ADULT ORAL NUTRITION SUPPLEMENT; Breakfast, Dinner;  Wound Healing Oral Supplement      PHYSICAL EXAM:      Patient Vitals for the past 24 hrs:   BP Temp Temp src Pulse Resp SpO2 Weight   11/09/22 1446 (!) 130/103 -- Oral 72 20 100 % --   11/09/22 1330 (!) 160/46 97.5 °F (36.4 °C) -- 98 -- 97 % --   11/09/22 1235 133/74 (!) 96.4 °F (35.8 °C) -- 74 18 -- 204 lb 12.9 oz (92.9 kg)   11/09/22 1200 (!) 129/35 -- -- 71 -- -- --   11/09/22 1130 127/66 -- -- 59 -- -- --   11/09/22 1100 (!) 125/52 -- -- 69 -- -- --   11/09/22 1030 (!) 112/49 -- -- 74 -- -- -- 11/09/22 1000 (!) 132/57 -- -- 75 -- -- --   11/09/22 0930 (!) 132/51 -- -- 76 -- -- --   11/09/22 0900 (!) 142/27 -- -- 72 -- -- --   11/09/22 0830 (!) 122/55 -- -- 72 -- -- --   11/09/22 0815 132/63 (!) 96.5 °F (35.8 °C) -- 72 18 -- 209 lb 7 oz (95 kg)   11/09/22 0600 -- -- -- -- -- -- 206 lb 9.1 oz (93.7 kg)   11/09/22 0515 -- -- -- -- 16 -- --   11/09/22 0445 -- -- -- -- 16 -- --   11/09/22 0147 (!) 135/56 97.9 °F (36.6 °C) Oral 80 16 100 % --   11/09/22 0136 -- -- -- -- 16 -- --   11/09/22 0106 -- -- -- -- 16 -- --   11/08/22 2123 -- -- -- -- 16 -- --   11/08/22 2053 -- -- -- -- 16 -- --   11/08/22 2038 133/66 97.3 °F (36.3 °C) Oral 70 16 97 % --   11/08/22 1658 128/68 97.9 °F (36.6 °C) -- 80 16 97 % --   11/08/22 1653 128/68 97.9 °F (36.6 °C) Oral 80 16 97 % --          Intake/Output Summary (Last 24 hours) at 11/9/2022 1551  Last data filed at 11/9/2022 1235  Gross per 24 hour   Intake 916.67 ml   Output 2500 ml   Net -1583.33 ml           Wt Readings from Last 3 Encounters:   11/09/22 204 lb 12.9 oz (92.9 kg)   11/01/22 192 lb (87.1 kg)   10/25/22 192 lb (87.1 kg)       Constitutional:  Patient in no acute distress  Head: normocephalic, atraumatic  Cardiovascular: S1-S2 no S3 or rub  Respiratory:  Clear upper diminished in the bases  Gastrointestinal: soft, nontender, nondistended  Ext: Right ankle with cast left trace edema, left wrist cast, LUE avf with bruit and thrill  Neuro: Awake alert and oriented  Skin: dry, no rash      DATA:      Recent Labs     11/07/22  0559 11/08/22  0252 11/08/22  0530 11/08/22  1915 11/09/22  0453   WBC 9.1 8.6  --   --  7.5   HGB 7.0* 6.5* 6.5* 7.5* 7.1*   HCT 23.1* 21.3* 21.6* 23.8* 23.0*   .5* 113.3*  --   --  107.5*    182  --   --  179     Recent Labs     11/07/22  0559 11/08/22  0252 11/09/22  0453    136 138   K 5.4* 5.0 5.3*   CL 96* 96* 97*   CO2 26 29 28   BUN 73* 39* 59*   CREATININE 8.5* 4.8* 6.6*   LABGLOM 5 9 6   GLUCOSE 201* 261* 201* CALCIUM 8.4* 8.7 9.0     No results for input(s): ALT in the last 72 hours. Invalid input(s):  AST,  ALKPHOS,  BILITOT,  BILIDIR    Lab Results   Component Value Date    LABALBU 3.6 11/03/2022    LABALBU 3.0 (L) 10/12/2022    LABALBU 3.2 (L) 09/14/2022       Iron studies:  Lab Results   Component Value Date    FERRITIN 767 05/05/2021    IRON 74 05/05/2021    TIBC 116 (L) 05/05/2021     Vitamin B-12   Date Value Ref Range Status   05/05/2021 895 211 - 946 pg/mL Final     Folate   Date Value Ref Range Status   05/05/2021 >20.0 4.8 - 24.2 ng/mL Final       Bone disease:  Lab Results   Component Value Date    MG 2.4 11/09/2022    PHOS 6.0 (H) 11/09/2022     Vit D, 25-Hydroxy   Date Value Ref Range Status   07/15/2022 45 30 - 100 ng/mL Final     Comment:     <20 ng/mL. ........... [place] Deficient  20-30 ng/mL. ......... [place] Insufficient   ng/mL. ........ [place] Sufficient  >100 ng/mL. .......... [place] Toxic       PTH   Date Value Ref Range Status   11/06/2022 466 (H) 15 - 65 pg/mL Final       No components found for: URIC    Lab Results   Component Value Date/Time    COLORU Yellow 07/08/2022 11:04 PM    NITRU Negative 07/08/2022 11:04 PM    GLUCOSEU 100 07/08/2022 11:04 PM    GLUCOSEU NEGATIVE 08/17/2011 10:30 PM    KETUA Negative 07/08/2022 11:04 PM    UROBILINOGEN 0.2 07/08/2022 11:04 PM    BILIRUBINUR Negative 07/08/2022 11:04 PM    BILIRUBINUR NEGATIVE 08/17/2011 10:30 PM       No results found for: LIPIDPAN        IMPRESSION/RECOMMENDATIONS:      ESKD-   Chronic IHD at [facility]  PLAN:  1. IHD 11/9/22- with 2.3L vol targeted  2. Follow Labs    2. Hyperkalemia-  PLAN:  manage with HD-2K+ bath  Follow K+    3. Hypertension with CKD 5/ESKD  Intermittent hypotension with HD  Chronically on midodrine with HD treatments. PLAN:  1. Continue to monitor     4.  Anemia of CKD-  HgB below goal 10-12 11/8/22 S/P PRBC  PLAN:  Will dose ARUN 3 times weekly with dialysis  while inpt-will use ARUN as outpt on discharge as per protocol  Follow  CBC    5. Hyperparathyroidism of renal origin -  PO4 7.3-->4.5-->6.0   PLAN:  1. Continue On sevelamer 2 tablet 3 times a day with meals  2.  Follow Ca++ and PO4    Electronically signed by Carmen Latham MD on 11/9/2022 at 3:51 PM

## 2022-11-09 NOTE — CARE COORDINATION
Social work / Discharge Planning:         Patient will discharge to [facility] at [place] via [facility]. Needs negative covid result prior to discharge. Social work updated RN, facility liaison and patient's son Scottie Carroll.      Electronically signed by SHANTA Gordon on 11/9/2022 at 10:19 AM

## 2022-11-09 NOTE — PROGRESS NOTES
Call placed to Monse Head NP with ortho for updated order for knee hinged brace from [facility]. Johana Starr to place order and would like to be contacted when brace applied by [facility]. Call placed to [facility] in regards to new order. Representative will be in with new brace today.  Electronically signed by Elier Reynolds RN on 11/9/2022 at 8:34 AM

## 2022-11-09 NOTE — PROGRESS NOTES
Subjective: The patient is awake and alert. No problems overnight. Denies chest pain, angina, and dyspnea. Denies abdominal pain. Tolerating diet. No nausea or vomiting. Objective:    BP (!) 135/56   Pulse 80   Temp 97.9 °F (36.6 °C) (Oral)   Resp 16   Ht 5' 1\" (1.549 m)   Wt 206 lb 9.1 oz (93.7 kg)   SpO2 100%   BMI 39.03 kg/m²     Heart:  RRR, no murmurs, gallops, or rubs.   Lungs:  CTA bilaterally, no wheeze, rales or rhonchi  Abd: bowel sounds present, nontender, nondistended, no masses  Extrem:  No clubbing, cyanosis, or edema    CBC with Differential:    Lab Results   Component Value Date/Time    WBC 7.5 11/09/2022 04:53 AM    RBC 2.14 11/09/2022 04:53 AM    HGB 7.1 11/09/2022 04:53 AM    HCT 23.0 11/09/2022 04:53 AM     11/09/2022 04:53 AM    .5 11/09/2022 04:53 AM    MCH 33.2 11/09/2022 04:53 AM    MCHC 30.9 11/09/2022 04:53 AM    RDW 17.4 11/09/2022 04:53 AM    NRBC 0.0 11/07/2022 05:59 AM    SEGSPCT 71 08/16/2013 05:00 AM    BANDSPCT 1 03/29/2016 07:37 PM    METASPCT 0.9 05/10/2020 03:45 AM    LYMPHOPCT 14.1 11/09/2022 04:53 AM    PROMYELOPCT 0.9 11/07/2022 05:59 AM    MONOPCT 9.9 11/09/2022 04:53 AM    MYELOPCT 1.7 05/11/2020 04:45 AM    BASOPCT 0.5 11/09/2022 04:53 AM    MONOSABS 0.74 11/09/2022 04:53 AM    LYMPHSABS 1.05 11/09/2022 04:53 AM    EOSABS 0.27 11/09/2022 04:53 AM    BASOSABS 0.04 11/09/2022 04:53 AM     CMP:    Lab Results   Component Value Date/Time     11/09/2022 04:53 AM    K 5.3 11/09/2022 04:53 AM    K 6.4 07/08/2022 09:42 PM    CL 97 11/09/2022 04:53 AM    CO2 28 11/09/2022 04:53 AM    BUN 59 11/09/2022 04:53 AM    CREATININE 6.6 11/09/2022 04:53 AM    GFRAA 7 10/12/2022 03:49 AM    LABGLOM 6 11/09/2022 04:53 AM    GLUCOSE 201 11/09/2022 04:53 AM    GLUCOSE 149 10/12/2011 09:59 AM    PROT 6.4 11/03/2022 10:12 PM    LABALBU 3.6 11/03/2022 10:12 PM    LABALBU 4.1 10/12/2011 09:59 AM    CALCIUM 9.0 11/09/2022 04:53 AM    BILITOT 0.3 11/03/2022 10:12 PM ALKPHOS 100 11/03/2022 10:12 PM    AST 23 11/03/2022 10:12 PM    ALT 17 11/03/2022 10:12 PM        Assessment:    Patient Active Problem List   Diagnosis    Neurologic gait dysfunction    Diabetes mellitus ([address]    Hypertension    Arthritis    Knee problem    Anemia due to stage 3 chronic kidney disease    Primary osteoarthritis of both knees    Moderate protein-calorie malnutrition (HCC)    Pressure injury of sacral region, stage 4 (HCC)    Pressure injury of right hip, stage 3 (HCC)    Left bundle branch block    Pure hypercholesterolemia    Moderate obesity    Failure to thrive in adult    Pressure injury of sacral region, stage 3 (HCC)    Decreased dorsalis pedis pulse    ESRD on hemodialysis ([address]    Closed bicondylar fracture of distal femur, right, initial encounter ([address]    Other fracture of right femur, initial encounter for closed fracture ([address]    Closed fracture of distal ends of left radius and ulna    Closed bicondylar fracture of distal end of right femur ([address]       Plan:  Needs hinged knee brace  Ok for Discharge         Katelyn Shaikh MD  8:33 AM  11/9/2022

## 2022-11-09 NOTE — PROGRESS NOTES
Called placed to [facility] asking on if they will be returning with the correct brace. Amandeep Woods the Marya. Vickie Quiroga said they came when patient was in dialysis and left and said they would be back this afternoon. I informed them she is to be picked up at 3pm to go to [facility]. Awaiting a call back.     Electronically signed by Evon Rainey RN on 11/9/2022 at 2:15 PM

## 2022-11-09 NOTE — PROGRESS NOTES
Department of Orthopedic Surgery      Subjective:  Pt has little complaints today. Pain controlled with medication. Ousmane came to fit the patient with a brace yesterday. This was not on the patient in the room. Looked at the brace and this is not a locked hinged knee brace. Discussed with nurses that Fadi needs to fit the patient with the proper brace. Vitals  VITALS:  BP (!) 135/56   Pulse 80   Temp 97.9 °F (36.6 °C) (Oral)   Resp 16   Ht 5' 1\" (1.549 m)   Wt 206 lb 9.1 oz (93.7 kg)   SpO2 100%   BMI 39.03 kg/m²       PHYSICAL EXAM:    Orientation:  alert and oriented to person, place and time    Left Upper Extremity    Dressing/Incision:  dressing in place, clean, dry, and intact    Upper Extremity Motor :  Radial, Median, Ulnar, PIN, AIN nerves  intact    Upper Extremity Sensory: intact to light touch    Pulses:   BCR fingers    Compartments:soft      right LE:   Dressing C/D/I  Distal sensory intact  +2/4 PT and DP  +PF/DF/EHL  Compartments soft and compressible          LABS:  CBC:   Lab Results   Component Value Date/Time    WBC 7.5 11/09/2022 04:53 AM    RBC 2.14 11/09/2022 04:53 AM    HGB 7.1 11/09/2022 04:53 AM    HCT 23.0 11/09/2022 04:53 AM    .5 11/09/2022 04:53 AM    MCH 33.2 11/09/2022 04:53 AM    MCHC 30.9 11/09/2022 04:53 AM    RDW 17.4 11/09/2022 04:53 AM     11/09/2022 04:53 AM    MPV 10.9 11/09/2022 04:53 AM       ASSESSMENT: POD # 5 Right distal femur ORIF and left distal radius ORIF  Acute postoperative blood loss anemia, asymptomatic       PLAN:  1:  Non weight bearing to right LE, Forearm WB to left UE. OK to access fistula for dialysis  2:  Fadi needs to come fit the patient for proper brace. Fadi consulted for Locked Hinged Knee Brace to right LE for distal femur fracture S/P ORIF. Lock in extension. With therapy patient can change brace to allow for knee ROM of 0-30 degrees. Okay to remove for hygiene care.    3:  OK for PT/OT   4:  Discharge planning as per PT/OT recommendations  5:  Deep venous thrombosis prophylaxis - ok to resume as per medicine. Will monitor H/H.  Appreciated medicines input   6:  Follow up office 1-2 weeks

## 2022-11-09 NOTE — PROGRESS NOTES
Physical Therapy    Pt NA for Rx this AM, in dialysis. Will follow on another date/time.   Camacho Garduno PTA [id number]

## 2022-11-11 LAB
ALBUMIN SERPL-MCNC: 2.1 G/DL (ref 3.5–5.2)
ALP BLD-CCNC: 101 U/L (ref 35–104)
ALT SERPL-CCNC: <5 U/L (ref 0–32)
ANION GAP SERPL CALCULATED.3IONS-SCNC: 13 MMOL/L (ref 7–16)
AST SERPL-CCNC: 25 U/L (ref 0–31)
BILIRUB SERPL-MCNC: 0.3 MG/DL (ref 0–1.2)
BUN BLDV-MCNC: 54 MG/DL (ref 6–23)
CALCIUM SERPL-MCNC: 9.4 MG/DL (ref 8.6–10.2)
CHLORIDE BLD-SCNC: 98 MMOL/L (ref 98–107)
CHOLESTEROL, TOTAL: 109 MG/DL (ref 0–199)
CO2: 21 MMOL/L (ref 22–29)
CREAT SERPL-MCNC: 5.1 MG/DL (ref 0.5–1)
GFR SERPL CREATININE-BSD FRML MDRD: 8 ML/MIN/1.73
GLUCOSE BLD-MCNC: 142 MG/DL (ref 74–99)
HBA1C MFR BLD: 5.8 % (ref 4–5.6)
HCT VFR BLD CALC: 24.5 % (ref 34–48)
HDLC SERPL-MCNC: 28 MG/DL
HEMOGLOBIN: 7.6 G/DL (ref 11.5–15.5)
LDL CHOLESTEROL CALCULATED: 57 MG/DL (ref 0–99)
MCH RBC QN AUTO: 34.4 PG (ref 26–35)
MCHC RBC AUTO-ENTMCNC: 31 % (ref 32–34.5)
MCV RBC AUTO: 110.9 FL (ref 80–99.9)
PDW BLD-RTO: 16.3 FL (ref 11.5–15)
PLATELET # BLD: 211 E9/L (ref 130–450)
PMV BLD AUTO: 11.2 FL (ref 7–12)
POTASSIUM SERPL-SCNC: 6.3 MMOL/L (ref 3.5–5)
RBC # BLD: 2.21 E12/L (ref 3.5–5.5)
SODIUM BLD-SCNC: 132 MMOL/L (ref 132–146)
TOTAL PROTEIN: 5.9 G/DL (ref 6.4–8.3)
TRIGL SERPL-MCNC: 119 MG/DL (ref 0–149)
VITAMIN D 25-HYDROXY: 32 NG/ML (ref 30–100)
VLDLC SERPL CALC-MCNC: 24 MG/DL
WBC # BLD: 6 E9/L (ref 4.5–11.5)

## 2022-11-12 LAB
ANION GAP SERPL CALCULATED.3IONS-SCNC: 15 MMOL/L (ref 7–16)
BUN BLDV-MCNC: 74 MG/DL (ref 6–23)
CALCIUM SERPL-MCNC: 9.3 MG/DL (ref 8.6–10.2)
CHLORIDE BLD-SCNC: 96 MMOL/L (ref 98–107)
CO2: 25 MMOL/L (ref 22–29)
CREAT SERPL-MCNC: 6.9 MG/DL (ref 0.5–1)
GFR SERPL CREATININE-BSD FRML MDRD: 6 ML/MIN/1.73
GLUCOSE BLD-MCNC: 192 MG/DL (ref 74–99)
HEPATITIS B SURFACE ANTIGEN INTERPRETATION: NORMAL
POTASSIUM SERPL-SCNC: 5.4 MMOL/L (ref 3.5–5)
SODIUM BLD-SCNC: 136 MMOL/L (ref 132–146)

## 2022-11-14 ENCOUNTER — OUTSIDE SERVICES (OUTPATIENT)
Dept: PRIMARY CARE CLINIC | Age: 72
End: 2022-11-14

## 2022-11-14 DIAGNOSIS — R41.89 OTHER SYMPTOMS AND SIGNS INVOLVING COGNITIVE FUNCTIONS AND AWARENESS: ICD-10-CM

## 2022-11-14 DIAGNOSIS — F33.9 EPISODE OF RECURRENT MAJOR DEPRESSIVE DISORDER, UNSPECIFIED DEPRESSION EPISODE SEVERITY (HCC): ICD-10-CM

## 2022-11-14 DIAGNOSIS — M25.9 JOINT DISORDER, UNSPECIFIED: ICD-10-CM

## 2022-11-14 DIAGNOSIS — F41.1 GENERALIZED ANXIETY DISORDER: ICD-10-CM

## 2022-11-14 DIAGNOSIS — R55 SYNCOPE AND COLLAPSE: ICD-10-CM

## 2022-11-14 DIAGNOSIS — E11.9 TYPE 2 DIABETES MELLITUS WITHOUT COMPLICATION, UNSPECIFIED WHETHER LONG TERM INSULIN USE (HCC): ICD-10-CM

## 2022-11-14 DIAGNOSIS — L89.153 PRESSURE ULCER OF SACRAL REGION, STAGE 3 (HCC): ICD-10-CM

## 2022-11-14 DIAGNOSIS — N18.9 ANEMIA IN CHRONIC KIDNEY DISEASE, UNSPECIFIED CKD STAGE: ICD-10-CM

## 2022-11-14 DIAGNOSIS — I44.7 LEFT BUNDLE-BRANCH BLOCK, UNSPECIFIED: ICD-10-CM

## 2022-11-14 DIAGNOSIS — D63.1 ANEMIA IN CHRONIC KIDNEY DISEASE, UNSPECIFIED CKD STAGE: ICD-10-CM

## 2022-11-14 DIAGNOSIS — E66.8 OTHER OBESITY: ICD-10-CM

## 2022-11-14 DIAGNOSIS — R53.83 OTHER FATIGUE: ICD-10-CM

## 2022-11-14 DIAGNOSIS — L89.43 PRESSURE INJURY OF CONTIGUOUS REGION INVOLVING BACK AND RIGHT HIP, STAGE 3 (HCC): ICD-10-CM

## 2022-11-14 DIAGNOSIS — E44.0 MODERATE PROTEIN-CALORIE MALNUTRITION (HCC): ICD-10-CM

## 2022-11-14 DIAGNOSIS — N28.9 DISORDER OF KIDNEY AND URETER: ICD-10-CM

## 2022-11-14 DIAGNOSIS — S52.602D UNSPECIFIED FRACTURE OF LOWER END OF LEFT ULNA, SUBSEQUENT ENCOUNTER FOR CLOSED FRACTURE WITH ROUTINE HEALING: ICD-10-CM

## 2022-11-14 DIAGNOSIS — N17.9 ACUTE RENAL FAILURE, UNSPECIFIED ACUTE RENAL FAILURE TYPE (HCC): ICD-10-CM

## 2022-11-14 DIAGNOSIS — L89.154 PRESSURE ULCER OF SACRAL REGION, STAGE 4 (HCC): ICD-10-CM

## 2022-11-14 DIAGNOSIS — S72.421D: ICD-10-CM

## 2022-11-14 DIAGNOSIS — Z99.2 DEPENDENCE ON RENAL DIALYSIS (HCC): ICD-10-CM

## 2022-11-14 DIAGNOSIS — N18.30 STAGE 3 CHRONIC KIDNEY DISEASE, UNSPECIFIED WHETHER STAGE 3A OR 3B CKD (HCC): ICD-10-CM

## 2022-11-14 DIAGNOSIS — M62.81 MUSCLE WEAKNESS: ICD-10-CM

## 2022-11-14 DIAGNOSIS — N18.6 END STAGE RENAL DISEASE (HCC): ICD-10-CM

## 2022-11-14 DIAGNOSIS — R62.7 ADULT FAILURE TO THRIVE: ICD-10-CM

## 2022-11-14 DIAGNOSIS — E87.5 HYPERKALEMIA: ICD-10-CM

## 2022-11-14 DIAGNOSIS — R09.89 OTHER SPECIFIED SYMPTOMS AND SIGNS INVOLVING THE CIRCULATORY AND RESPIRATORY SYSTEMS: ICD-10-CM

## 2022-11-14 DIAGNOSIS — U07.1 COVID-19: Primary | ICD-10-CM

## 2022-11-14 DIAGNOSIS — I10 ESSENTIAL HYPERTENSION: ICD-10-CM
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
## 2022-11-14 DIAGNOSIS — S72.091D OTHER FRACTURE OF HEAD AND NECK OF RIGHT FEMUR, SUBSEQUENT ENCOUNTER FOR CLOSED FRACTURE WITH ROUTINE HEALING: ICD-10-CM

## 2022-11-14 DIAGNOSIS — S52.502D UNSPECIFIED FRACTURE OF THE LOWER END OF LEFT RADIUS, SUBSEQUENT ENCOUNTER FOR CLOSED FRACTURE WITH ROUTINE HEALING: ICD-10-CM

## 2022-11-14 DIAGNOSIS — M19.90 OSTEOARTHRITIS, UNSPECIFIED OSTEOARTHRITIS TYPE, UNSPECIFIED SITE: ICD-10-CM

## 2022-11-14 DIAGNOSIS — R26.9 UNSPECIFIED ABNORMALITIES OF GAIT AND MOBILITY: ICD-10-CM

## 2022-11-14 DIAGNOSIS — E78.00 PURE HYPERCHOLESTEROLEMIA: ICD-10-CM

## 2022-11-14 DIAGNOSIS — M17.0 BILATERAL PRIMARY OSTEOARTHRITIS OF KNEE: ICD-10-CM

## 2022-11-14 LAB
ALBUMIN SERPL-MCNC: 2.6 G/DL (ref 3.5–5.2)
ALP BLD-CCNC: 154 U/L (ref 35–104)
ALT SERPL-CCNC: <5 U/L (ref 0–32)
ANION GAP SERPL CALCULATED.3IONS-SCNC: 15 MMOL/L (ref 7–16)
AST SERPL-CCNC: 33 U/L (ref 0–31)
BILIRUB SERPL-MCNC: 0.3 MG/DL (ref 0–1.2)
BUN BLDV-MCNC: 43 MG/DL (ref 6–23)
CALCIUM SERPL-MCNC: 9.6 MG/DL (ref 8.6–10.2)
CHLORIDE BLD-SCNC: 97 MMOL/L (ref 98–107)
CO2: 26 MMOL/L (ref 22–29)
CREAT SERPL-MCNC: 5 MG/DL (ref 0.5–1)
GFR SERPL CREATININE-BSD FRML MDRD: 9 ML/MIN/1.73
GLUCOSE BLD-MCNC: 219 MG/DL (ref 74–99)
HCT VFR BLD CALC: 26 % (ref 34–48)
HCT VFR BLD CALC: 27.5 % (ref 34–48)
HEMOGLOBIN: 8.1 G/DL (ref 11.5–15.5)
HEMOGLOBIN: 8.6 G/DL (ref 11.5–15.5)
MCH RBC QN AUTO: 34.8 PG (ref 26–35)
MCHC RBC AUTO-ENTMCNC: 31.3 % (ref 32–34.5)
MCV RBC AUTO: 111.3 FL (ref 80–99.9)
PDW BLD-RTO: 16.4 FL (ref 11.5–15)
PLATELET # BLD: 239 E9/L (ref 130–450)
PMV BLD AUTO: 10.8 FL (ref 7–12)
POTASSIUM SERPL-SCNC: 5.6 MMOL/L (ref 3.5–5)
RBC # BLD: 2.47 E12/L (ref 3.5–5.5)
SODIUM BLD-SCNC: 138 MMOL/L (ref 132–146)
TOTAL PROTEIN: 6.5 G/DL (ref 6.4–8.3)
WBC # BLD: 10.5 E9/L (ref 4.5–11.5)

## 2022-11-17 LAB
ALBUMIN SERPL-MCNC: 2.9 G/DL (ref 3.5–5.2)
ALP BLD-CCNC: 132 U/L (ref 35–104)
ALT SERPL-CCNC: 7 U/L (ref 0–32)
ANION GAP SERPL CALCULATED.3IONS-SCNC: 9 MMOL/L (ref 7–16)
AST SERPL-CCNC: 25 U/L (ref 0–31)
BILIRUB SERPL-MCNC: 0.3 MG/DL (ref 0–1.2)
BUN BLDV-MCNC: 33 MG/DL (ref 6–23)
CALCIUM SERPL-MCNC: 9.8 MG/DL (ref 8.6–10.2)
CHLORIDE BLD-SCNC: 99 MMOL/L (ref 98–107)
CO2: 31 MMOL/L (ref 22–29)
CREAT SERPL-MCNC: 4.2 MG/DL (ref 0.5–1)
GFR SERPL CREATININE-BSD FRML MDRD: 11 ML/MIN/1.73
GLUCOSE BLD-MCNC: 134 MG/DL (ref 74–99)
HCT VFR BLD CALC: 27.5 % (ref 34–48)
HEMOGLOBIN: 8.3 G/DL (ref 11.5–15.5)
MCH RBC QN AUTO: 34.7 PG (ref 26–35)
MCHC RBC AUTO-ENTMCNC: 30.2 % (ref 32–34.5)
MCV RBC AUTO: 115.1 FL (ref 80–99.9)
PDW BLD-RTO: 17.5 FL (ref 11.5–15)
PLATELET # BLD: 236 E9/L (ref 130–450)
PMV BLD AUTO: 10.9 FL (ref 7–12)
POTASSIUM SERPL-SCNC: 4.6 MMOL/L (ref 3.5–5)
RBC # BLD: 2.39 E12/L (ref 3.5–5.5)
SODIUM BLD-SCNC: 139 MMOL/L (ref 132–146)
TOTAL PROTEIN: 5.9 G/DL (ref 6.4–8.3)
WBC # BLD: 7.9 E9/L (ref 4.5–11.5)

## 2022-11-18 LAB
HCT VFR BLD CALC: 26.3 % (ref 34–48)
HEMOGLOBIN: 8.1 G/DL (ref 11.5–15.5)

## 2022-11-21 LAB
ALBUMIN SERPL-MCNC: 2.8 G/DL (ref 3.5–5.2)
ALP BLD-CCNC: 156 U/L (ref 35–104)
ALT SERPL-CCNC: <5 U/L (ref 0–32)
ANION GAP SERPL CALCULATED.3IONS-SCNC: 13 MMOL/L (ref 7–16)
AST SERPL-CCNC: 17 U/L (ref 0–31)
BILIRUB SERPL-MCNC: 0.3 MG/DL (ref 0–1.2)
BUN BLDV-MCNC: 51 MG/DL (ref 6–23)
CALCIUM SERPL-MCNC: 10.2 MG/DL (ref 8.6–10.2)
CHLORIDE BLD-SCNC: 98 MMOL/L (ref 98–107)
CO2: 28 MMOL/L (ref 22–29)
CREAT SERPL-MCNC: 6.3 MG/DL (ref 0.5–1)
GFR SERPL CREATININE-BSD FRML MDRD: 7 ML/MIN/1.73
GLUCOSE BLD-MCNC: 118 MG/DL (ref 74–99)
HCT VFR BLD CALC: 30 % (ref 34–48)
HEMOGLOBIN: 9.1 G/DL (ref 11.5–15.5)
MCH RBC QN AUTO: 36 PG (ref 26–35)
MCHC RBC AUTO-ENTMCNC: 30.3 % (ref 32–34.5)
MCV RBC AUTO: 118.6 FL (ref 80–99.9)
PDW BLD-RTO: 18.3 FL (ref 11.5–15)
PLATELET # BLD: 232 E9/L (ref 130–450)
PMV BLD AUTO: 10.3 FL (ref 7–12)
POTASSIUM SERPL-SCNC: 5.8 MMOL/L (ref 3.5–5)
RBC # BLD: 2.53 E12/L (ref 3.5–5.5)
SODIUM BLD-SCNC: 139 MMOL/L (ref 132–146)
TOTAL PROTEIN: 6 G/DL (ref 6.4–8.3)
WBC # BLD: 10.8 E9/L (ref 4.5–11.5)

## 2022-11-23 ENCOUNTER — TELEPHONE (OUTPATIENT)
Dept: ORTHOPEDIC SURGERY | Age: 72
End: 2022-11-23

## 2022-11-23 DIAGNOSIS — S52.602A CLOSED FRACTURE OF DISTAL ENDS OF LEFT RADIUS AND ULNA, INITIAL ENCOUNTER: ICD-10-CM

## 2022-11-23 DIAGNOSIS — S72.8X1A OTHER FRACTURE OF RIGHT FEMUR, INITIAL ENCOUNTER FOR CLOSED FRACTURE (HCC): Primary | ICD-10-CM

## 2022-11-23 DIAGNOSIS — S52.502A CLOSED FRACTURE OF DISTAL ENDS OF LEFT RADIUS AND ULNA, INITIAL ENCOUNTER: ICD-10-CM

## 2022-11-23 LAB
ALBUMIN SERPL-MCNC: 2.6 G/DL (ref 3.5–5.2)
ALP BLD-CCNC: 172 U/L (ref 35–104)
ALT SERPL-CCNC: <5 U/L (ref 0–32)
ANION GAP SERPL CALCULATED.3IONS-SCNC: 14 MMOL/L (ref 7–16)
AST SERPL-CCNC: 13 U/L (ref 0–31)
BILIRUB SERPL-MCNC: 0.2 MG/DL (ref 0–1.2)
BUN BLDV-MCNC: 78 MG/DL (ref 6–23)
CALCIUM SERPL-MCNC: 9.6 MG/DL (ref 8.6–10.2)
CHLORIDE BLD-SCNC: 99 MMOL/L (ref 98–107)
CO2: 29 MMOL/L (ref 22–29)
CREAT SERPL-MCNC: 8.2 MG/DL (ref 0.5–1)
GFR SERPL CREATININE-BSD FRML MDRD: 5 ML/MIN/1.73
GLUCOSE BLD-MCNC: 175 MG/DL (ref 74–99)
HCT VFR BLD CALC: 26.7 % (ref 34–48)
HEMOGLOBIN: 8 G/DL (ref 11.5–15.5)
INR BLD: 1
MCH RBC QN AUTO: 35.1 PG (ref 26–35)
MCHC RBC AUTO-ENTMCNC: 30 % (ref 32–34.5)
MCV RBC AUTO: 117.1 FL (ref 80–99.9)
PDW BLD-RTO: 18.3 FL (ref 11.5–15)
PLATELET # BLD: 216 E9/L (ref 130–450)
PMV BLD AUTO: 10.5 FL (ref 7–12)
POTASSIUM SERPL-SCNC: 6.7 MMOL/L (ref 3.5–5)
PROTHROMBIN TIME: 10.6 SEC (ref 9.3–12.4)
RBC # BLD: 2.28 E12/L (ref 3.5–5.5)
SODIUM BLD-SCNC: 142 MMOL/L (ref 132–146)
TOTAL PROTEIN: 5.9 G/DL (ref 6.4–8.3)
WBC # BLD: 6.2 E9/L (ref 4.5–11.5)

## 2022-11-25 ENCOUNTER — APPOINTMENT (OUTPATIENT)
Dept: CT IMAGING | Age: 72
DRG: 070 | End: 2022-11-25
Payer: MEDICARE

## 2022-11-25 ENCOUNTER — HOSPITAL ENCOUNTER (INPATIENT)
Age: 72
LOS: 5 days | Discharge: NURSING, REHAB OR LONG-TERM CARE | DRG: 070 | End: 2022-11-30
Attending: EMERGENCY MEDICINE | Admitting: FAMILY MEDICINE
Payer: MEDICARE

## 2022-11-25 DIAGNOSIS — N39.0 UTI (URINARY TRACT INFECTION), UNCOMPLICATED: Primary | ICD-10-CM

## 2022-11-25 LAB
ALBUMIN SERPL-MCNC: 3.1 G/DL (ref 3.5–5.2)
ALP BLD-CCNC: 140 U/L (ref 35–104)
ALT SERPL-CCNC: 9 U/L (ref 0–32)
ANION GAP SERPL CALCULATED.3IONS-SCNC: 14 MMOL/L (ref 7–16)
ANISOCYTOSIS: ABNORMAL
AST SERPL-CCNC: 20 U/L (ref 0–31)
BACTERIA: ABNORMAL /HPF
BASOPHILS ABSOLUTE: 0.04 E9/L (ref 0–0.2)
BASOPHILS RELATIVE PERCENT: 0.6 % (ref 0–2)
BILIRUB SERPL-MCNC: 0.4 MG/DL (ref 0–1.2)
BILIRUBIN URINE: NEGATIVE
BLOOD, URINE: ABNORMAL
BUN BLDV-MCNC: 27 MG/DL (ref 6–23)
CALCIUM SERPL-MCNC: 9.8 MG/DL (ref 8.6–10.2)
CHLORIDE BLD-SCNC: 97 MMOL/L (ref 98–107)
CLARITY: ABNORMAL
CO2: 28 MMOL/L (ref 22–29)
COLOR: YELLOW
CREAT SERPL-MCNC: 4.2 MG/DL (ref 0.5–1)
EKG ATRIAL RATE: 76 BPM
EKG P AXIS: 3 DEGREES
EKG P-R INTERVAL: 248 MS
EKG Q-T INTERVAL: 454 MS
EKG QRS DURATION: 140 MS
EKG QTC CALCULATION (BAZETT): 510 MS
EKG R AXIS: -28 DEGREES
EKG T AXIS: 96 DEGREES
EKG VENTRICULAR RATE: 76 BPM
EOSINOPHILS ABSOLUTE: 0.11 E9/L (ref 0.05–0.5)
EOSINOPHILS RELATIVE PERCENT: 1.5 % (ref 0–6)
GFR SERPL CREATININE-BSD FRML MDRD: 11 ML/MIN/1.73
GLUCOSE BLD-MCNC: 78 MG/DL (ref 74–99)
GLUCOSE BLD-MCNC: 90 MG/DL
GLUCOSE URINE: NEGATIVE MG/DL
HCT VFR BLD CALC: 30.7 % (ref 34–48)
HEMOGLOBIN: 9.4 G/DL (ref 11.5–15.5)
IMMATURE GRANULOCYTES #: 0.07 E9/L
IMMATURE GRANULOCYTES %: 1 % (ref 0–5)
KETONES, URINE: NEGATIVE MG/DL
LEUKOCYTE ESTERASE, URINE: ABNORMAL
LYMPHOCYTES ABSOLUTE: 0.77 E9/L (ref 1.5–4)
LYMPHOCYTES RELATIVE PERCENT: 10.8 % (ref 20–42)
MCH RBC QN AUTO: 35.5 PG (ref 26–35)
MCHC RBC AUTO-ENTMCNC: 30.6 % (ref 32–34.5)
MCV RBC AUTO: 115.8 FL (ref 80–99.9)
METER GLUCOSE: 89 MG/DL (ref 74–99)
METER GLUCOSE: 90 MG/DL (ref 74–99)
MONOCYTES ABSOLUTE: 0.74 E9/L (ref 0.1–0.95)
MONOCYTES RELATIVE PERCENT: 10.4 % (ref 2–12)
NEUTROPHILS ABSOLUTE: 5.39 E9/L (ref 1.8–7.3)
NEUTROPHILS RELATIVE PERCENT: 75.7 % (ref 43–80)
NITRITE, URINE: NEGATIVE
PDW BLD-RTO: 18.2 FL (ref 11.5–15)
PH UA: 7.5 (ref 5–9)
PLATELET # BLD: 239 E9/L (ref 130–450)
PMV BLD AUTO: 9.5 FL (ref 7–12)
POLYCHROMASIA: ABNORMAL
POTASSIUM REFLEX MAGNESIUM: 5.1 MMOL/L (ref 3.5–5)
PROTEIN UA: 100 MG/DL
RBC # BLD: 2.65 E12/L (ref 3.5–5.5)
RBC UA: ABNORMAL /HPF (ref 0–2)
SODIUM BLD-SCNC: 139 MMOL/L (ref 132–146)
SPECIFIC GRAVITY UA: 1.02 (ref 1–1.03)
TOTAL PROTEIN: 6.5 G/DL (ref 6.4–8.3)
TROPONIN, HIGH SENSITIVITY: 139 NG/L (ref 0–9)
TROPONIN, HIGH SENSITIVITY: 142 NG/L (ref 0–9)
UROBILINOGEN, URINE: 0.2 E.U./DL
WBC # BLD: 7.1 E9/L (ref 4.5–11.5)
WBC UA: ABNORMAL /HPF (ref 0–5)

## 2022-11-25 PROCEDURE — 2580000003 HC RX 258

## 2022-11-25 PROCEDURE — 93010 ELECTROCARDIOGRAM REPORT: CPT | Performed by: INTERNAL MEDICINE

## 2022-11-25 PROCEDURE — 93005 ELECTROCARDIOGRAM TRACING: CPT

## 2022-11-25 PROCEDURE — 6360000002 HC RX W HCPCS

## 2022-11-25 PROCEDURE — 6360000002 HC RX W HCPCS: Performed by: FAMILY MEDICINE

## 2022-11-25 PROCEDURE — 2060000000 HC ICU INTERMEDIATE R&B

## 2022-11-25 PROCEDURE — 80053 COMPREHEN METABOLIC PANEL: CPT

## 2022-11-25 PROCEDURE — 82962 GLUCOSE BLOOD TEST: CPT

## 2022-11-25 PROCEDURE — 84484 ASSAY OF TROPONIN QUANT: CPT

## 2022-11-25 PROCEDURE — 81001 URINALYSIS AUTO W/SCOPE: CPT

## 2022-11-25 PROCEDURE — 6370000000 HC RX 637 (ALT 250 FOR IP): Performed by: FAMILY MEDICINE

## 2022-11-25 PROCEDURE — 99285 EMERGENCY DEPT VISIT HI MDM: CPT

## 2022-11-25 PROCEDURE — 70450 CT HEAD/BRAIN W/O DYE: CPT

## 2022-11-25 PROCEDURE — 2580000003 HC RX 258: Performed by: FAMILY MEDICINE

## 2022-11-25 PROCEDURE — 85025 COMPLETE CBC W/AUTO DIFF WBC: CPT

## 2022-11-25 RX ORDER — BUSPIRONE HYDROCHLORIDE 10 MG/1
10 TABLET ORAL 2 TIMES DAILY
Status: DISCONTINUED | OUTPATIENT
Start: 2022-11-25 | End: 2022-11-30 | Stop reason: HOSPADM

## 2022-11-25 RX ORDER — SODIUM CHLORIDE 0.9 % (FLUSH) 0.9 %
5-40 SYRINGE (ML) INJECTION PRN
Status: DISCONTINUED | OUTPATIENT
Start: 2022-11-25 | End: 2022-11-30 | Stop reason: HOSPADM

## 2022-11-25 RX ORDER — SODIUM CHLORIDE 9 MG/ML
INJECTION, SOLUTION INTRAVENOUS PRN
Status: DISCONTINUED | OUTPATIENT
Start: 2022-11-25 | End: 2022-11-30 | Stop reason: HOSPADM

## 2022-11-25 RX ORDER — GABAPENTIN 300 MG/1
300 CAPSULE ORAL 3 TIMES DAILY
Status: DISCONTINUED | OUTPATIENT
Start: 2022-11-25 | End: 2022-11-30 | Stop reason: HOSPADM

## 2022-11-25 RX ORDER — METOPROLOL SUCCINATE 25 MG/1
25 TABLET, EXTENDED RELEASE ORAL DAILY
Status: DISCONTINUED | OUTPATIENT
Start: 2022-11-25 | End: 2022-11-30 | Stop reason: HOSPADM

## 2022-11-25 RX ORDER — DEXTROSE MONOHYDRATE 100 MG/ML
INJECTION, SOLUTION INTRAVENOUS CONTINUOUS PRN
Status: DISCONTINUED | OUTPATIENT
Start: 2022-11-25 | End: 2022-11-30 | Stop reason: HOSPADM

## 2022-11-25 RX ORDER — MIDODRINE HYDROCHLORIDE 5 MG/1
15 TABLET ORAL
Status: DISCONTINUED | OUTPATIENT
Start: 2022-11-25 | End: 2022-11-30 | Stop reason: HOSPADM

## 2022-11-25 RX ORDER — INSULIN LISPRO 100 [IU]/ML
0-8 INJECTION, SOLUTION INTRAVENOUS; SUBCUTANEOUS
Status: DISCONTINUED | OUTPATIENT
Start: 2022-11-25 | End: 2022-11-30 | Stop reason: HOSPADM

## 2022-11-25 RX ORDER — CHOLECALCIFEROL (VITAMIN D3) 50 MCG
2000 TABLET ORAL DAILY
Status: DISCONTINUED | OUTPATIENT
Start: 2022-11-25 | End: 2022-11-30 | Stop reason: HOSPADM

## 2022-11-25 RX ORDER — POLYETHYLENE GLYCOL 3350 17 G/17G
17 POWDER, FOR SOLUTION ORAL DAILY
Status: DISCONTINUED | OUTPATIENT
Start: 2022-11-25 | End: 2022-11-30 | Stop reason: HOSPADM

## 2022-11-25 RX ORDER — HEPARIN SODIUM 10000 [USP'U]/ML
5000 INJECTION, SOLUTION INTRAVENOUS; SUBCUTANEOUS EVERY 8 HOURS SCHEDULED
Status: DISCONTINUED | OUTPATIENT
Start: 2022-11-25 | End: 2022-11-30 | Stop reason: HOSPADM

## 2022-11-25 RX ORDER — ACETAMINOPHEN 500 MG
1000 TABLET ORAL EVERY 8 HOURS PRN
Status: DISCONTINUED | OUTPATIENT
Start: 2022-11-25 | End: 2022-11-30 | Stop reason: HOSPADM

## 2022-11-25 RX ORDER — ESCITALOPRAM OXALATE 10 MG/1
10 TABLET ORAL DAILY
Status: DISCONTINUED | OUTPATIENT
Start: 2022-11-25 | End: 2022-11-30 | Stop reason: HOSPADM

## 2022-11-25 RX ORDER — SENNA PLUS 8.6 MG/1
2 TABLET ORAL 2 TIMES DAILY
Status: DISCONTINUED | OUTPATIENT
Start: 2022-11-25 | End: 2022-11-30 | Stop reason: HOSPADM

## 2022-11-25 RX ORDER — SODIUM CHLORIDE 0.9 % (FLUSH) 0.9 %
5-40 SYRINGE (ML) INJECTION EVERY 12 HOURS SCHEDULED
Status: DISCONTINUED | OUTPATIENT
Start: 2022-11-25 | End: 2022-11-30 | Stop reason: HOSPADM

## 2022-11-25 RX ORDER — INSULIN LISPRO 100 [IU]/ML
0-4 INJECTION, SOLUTION INTRAVENOUS; SUBCUTANEOUS NIGHTLY
Status: DISCONTINUED | OUTPATIENT
Start: 2022-11-25 | End: 2022-11-30 | Stop reason: HOSPADM

## 2022-11-25 RX ORDER — PROCHLORPERAZINE MALEATE 5 MG/1
5 TABLET ORAL EVERY 6 HOURS PRN
Status: DISCONTINUED | OUTPATIENT
Start: 2022-11-25 | End: 2022-11-30 | Stop reason: HOSPADM

## 2022-11-25 RX ORDER — TRAZODONE HYDROCHLORIDE 50 MG/1
25 TABLET ORAL NIGHTLY PRN
Status: DISCONTINUED | OUTPATIENT
Start: 2022-11-25 | End: 2022-11-30 | Stop reason: HOSPADM

## 2022-11-25 RX ORDER — INSULIN GLARGINE 100 [IU]/ML
6 INJECTION, SOLUTION SUBCUTANEOUS NIGHTLY
Status: DISCONTINUED | OUTPATIENT
Start: 2022-11-25 | End: 2022-11-30 | Stop reason: HOSPADM

## 2022-11-25 RX ADMIN — ESCITALOPRAM OXALATE 10 MG: 10 TABLET ORAL at 22:45

## 2022-11-25 RX ADMIN — BUSPIRONE HYDROCHLORIDE 10 MG: 10 TABLET ORAL at 22:46

## 2022-11-25 RX ADMIN — TRAZODONE HYDROCHLORIDE 25 MG: 50 TABLET ORAL at 22:45

## 2022-11-25 RX ADMIN — GABAPENTIN 300 MG: 300 CAPSULE ORAL at 22:45

## 2022-11-25 RX ADMIN — SENNOSIDES 17.2 MG: 8.6 TABLET, FILM COATED ORAL at 22:46

## 2022-11-25 RX ADMIN — ACETAMINOPHEN 1000 MG: 500 TABLET ORAL at 22:46

## 2022-11-25 RX ADMIN — METOPROLOL SUCCINATE 25 MG: 25 TABLET, EXTENDED RELEASE ORAL at 22:46

## 2022-11-25 RX ADMIN — Medication 2000 UNITS: at 22:45

## 2022-11-25 RX ADMIN — WATER 1000 MG: 1 INJECTION INTRAMUSCULAR; INTRAVENOUS; SUBCUTANEOUS at 18:00

## 2022-11-25 RX ADMIN — HEPARIN SODIUM 5000 UNITS: 10000 INJECTION INTRAVENOUS; SUBCUTANEOUS at 22:46

## 2022-11-25 RX ADMIN — Medication 10 ML: at 22:46

## 2022-11-25 ASSESSMENT — PAIN - FUNCTIONAL ASSESSMENT: PAIN_FUNCTIONAL_ASSESSMENT: 0-10

## 2022-11-25 ASSESSMENT — PAIN SCALES - GENERAL: PAINLEVEL_OUTOF10: 3

## 2022-11-25 NOTE — ED PROVIDER NOTES
69-year-old female with history of Bell's palsy, anemia, CKD on dialysis. Presents emerged department by the advice of her daughter from dialysis center  for slurred speech. Symptoms started over 24 hours ago and have not resolved. Symptoms are constant in timing. No aggravating alleviating factors noted. No palliative measures taken. Patient reports she is doing fine and does not believe her daughter assessment for slurred speech. She is status post right and left distal radius open reduction done by orthopedics earlier this month. Her only complaint is leg pain from the surgery. She does get dialysis 3 days a week. .  She denies the following headache, vision changes, chest pain, shortness of breath, leg edema/erythema, cough. Fever, chills, s syncope. Chief Complaint   Patient presents with    Altered Mental Status     Pt feeling \" normal \" per family slurred speech noted prior to going to dialysis, sent from ED after dialysis per family request        Review of Systems   Pertinent review of systems stated in HPI above  Physical Exam  Constitutional:       Appearance: She is well-developed. HENT:      Head: Normocephalic and atraumatic. Eyes:      Extraocular Movements: Extraocular movements intact. Right eye: Normal extraocular motion and no nystagmus. Left eye: Normal extraocular motion and no nystagmus. Pupils:      Right eye: Pupil is round and reactive. Left eye: Pupil is reactive. Cardiovascular:      Rate and Rhythm: Normal rate and regular rhythm. Heart sounds:     No gallop. Pulmonary:      Effort: Pulmonary effort is normal. No respiratory distress. Breath sounds: No wheezing. Abdominal:      Palpations: Abdomen is soft. Tenderness: There is no abdominal tenderness. Musculoskeletal:      Cervical back: Normal range of motion and neck supple.       Comments: Range of motion limited patient is a right leg cast from surgery, and a thumb cast..  Dorsal and radial pulses present. Sensation is intact   Skin:     General: Skin is warm. Capillary Refill: Capillary refill takes less than 2 seconds. Neurological:      Mental Status: She is alert and oriented to person, place, and time. Procedures     EKG: This EKG is signed by emergency department physician. Rate: 70  Rhythm: Sinus rhythm with first-degree AV block  AXIS: Regular left axis  ST Changes: No ST elevation no hyperacute T waves. Interpretation: non-specific EKG  Comparison: no previous EKG available     MDM  65-year-old female patient history of Bell's palsy, anemia of chronic disease presents presents for for speech. She is alert and oriented x3. No slurred speech noted NIH scale 10, however the stroke scale is hard to obtain due to patient's current status and past medical history. Vitals are stable. We considered Don criteria and tPA criteria for however her symptom symptoms are more than 24 hours ago. CBC: No increase in white blood cell count, hemoglobin is stable. Repeat troponin has a delta 3 EKG shows no ST elevation or hyperacute T waves. Fingerstick blood glucose 90. Creatinine is 4.2 to be at baseline and patient went to dialysis today. .  Potassium slightly elevated with no EKG changes. Urinalysis shows packed white blood cells, positive leukocyte esterase. She was given 1 g of Rocephin. Upon reevaluation she noticed changes in neuro status. I spoke with patient's PCP Dr. Kenneth Holland, I discussed the patient case in great detail. I explained to him why we did not activate Dondero given tPA due to patient's onset of symptoms, and believe the patient is having altered mental status due to UTI infection. ED Course as of 11/25/22 1349   Fri Nov 25, 2022   [address]  alert and tPA protocol considered.   However patient's symptoms onset have been over 24 hours she is outside the window of tPA [MN]      ED Course User Index  [MN] True Watts,         ED Course as of 22   [address]  alert and tPA protocol considered. However patient's symptoms onset have been over 24 hours she is outside the window of tPA [MN]      ED Course User Index  [MN] Columba Camacho DO       --------------------------------------------- PAST HISTORY ---------------------------------------------  Past Medical History:  has a past medical history of Anemia in chronic kidney disease, Anxiety and depression, Arthritis, Chronic pain syndrome, COVID-19, COVID-19, Decreased dorsalis pedis pulse, Diabetes mellitus ([address] Dysphagia, oral phase, ESRD on hemodialysis ([address] GERD (gastroesophageal reflux disease), Hemodialysis patient ([address] Hyperkalemia, Hypertension, Hypertensive kidney disease with stage 4 chronic kidney disease ([address] Insomnia, Kidney stones, MDD (major depressive disorder), Moderate protein-calorie malnutrition ([address] Morbid obesity ([address] Muscle weakness (generalized), Obesity, Pressure injury of sacral region, stage 3 ([address] Pressure ulcer of sacral region, Sacral pressure ulcer, Unspecified osteoarthritis, unspecified site, and Weakness generalized. Past Surgical History:  has a past surgical history that includes Foot surgery ( ); Cystocopy (2011 );  section (x3); Upper gastrointestinal endoscopy (2.18.15); Cholecystectomy (3/31/16); Abdomen surgery (N/A, 2020); Upper gastrointestinal endoscopy (N/A, 2020); Upper gastrointestinal endoscopy (N/A, 2020); vascular surgery (N/A, 2021); Dialysis fistula creation (Left, 2021); Dialysis fistula creation (Right, 2021); Femur fracture surgery (Right, 2022); and Wrist fracture surgery (Left, 2022). Social History:  reports that she quit smoking about 11 years ago. She has a 30.00 pack-year smoking history. She has never used smokeless tobacco. She reports that she does not drink alcohol and does not use drugs.     Family History: family history includes Cancer in her sister. The patients home medications have been reviewed. Allergies: Patient has no known allergies.     -------------------------------------------------- RESULTS -------------------------------------------------    LABS:  Results for orders placed or performed during the hospital encounter of 11/25/22   CBC with Auto Differential   Result Value Ref Range    WBC 7.1 4.5 - 11.5 E9/L    RBC 2.65 (L) 3.50 - 5.50 E12/L    Hemoglobin 9.4 (L) 11.5 - 15.5 g/dL    Hematocrit 30.7 (L) 34.0 - 48.0 %    .8 (H) 80.0 - 99.9 fL    MCH 35.5 (H) 26.0 - 35.0 pg    MCHC 30.6 (L) 32.0 - 34.5 %    RDW 18.2 (H) 11.5 - 15.0 fL    Platelets 392 487 - 382 E9/L    MPV 9.5 7.0 - 12.0 fL    Neutrophils % 75.7 43.0 - 80.0 %    Immature Granulocytes % 1.0 0.0 - 5.0 %    Lymphocytes % 10.8 (L) 20.0 - 42.0 %    Monocytes % 10.4 2.0 - 12.0 %    Eosinophils % 1.5 0.0 - 6.0 %    Basophils % 0.6 0.0 - 2.0 %    Neutrophils Absolute 5.39 1.80 - 7.30 E9/L    Immature Granulocytes # 0.07 E9/L    Lymphocytes Absolute 0.77 (L) 1.50 - 4.00 E9/L    Monocytes Absolute 0.74 0.10 - 0.95 E9/L    Eosinophils Absolute 0.11 0.05 - 0.50 E9/L    Basophils Absolute 0.04 0.00 - 0.20 E9/L    Anisocytosis 2+     Polychromasia 1+    Comprehensive Metabolic Panel w/ Reflex to MG   Result Value Ref Range    Sodium 139 132 - 146 mmol/L    Potassium reflex Magnesium 5.1 (H) 3.5 - 5.0 mmol/L    Chloride 97 (L) 98 - 107 mmol/L    CO2 28 22 - 29 mmol/L    Anion Gap 14 7 - 16 mmol/L    Glucose 78 74 - 99 mg/dL    BUN 27 (H) 6 - 23 mg/dL    Creatinine 4.2 (H) 0.5 - 1.0 mg/dL    Est, Glom Filt Rate 11 >=60 mL/min/1.73    Calcium 9.8 8.6 - 10.2 mg/dL    Total Protein 6.5 6.4 - 8.3 g/dL    Albumin 3.1 (L) 3.5 - 5.2 g/dL    Total Bilirubin 0.4 0.0 - 1.2 mg/dL    Alkaline Phosphatase 140 (H) 35 - 104 U/L    ALT 9 0 - 32 U/L    AST 20 0 - 31 U/L   Troponin   Result Value Ref Range    Troponin, High Sensitivity 142 (H) 0 - 9 ng/L   Urinalysis with Microscopic   Result Value Ref Range    Color, UA Yellow Straw/Yellow    Clarity, UA TURBID (A) Clear    Glucose, Ur Negative Negative mg/dL    Bilirubin Urine Negative Negative    Ketones, Urine Negative Negative mg/dL    Specific Gravity, UA 1.020 1.005 - 1.030    Blood, Urine SMALL (A) Negative    pH, UA 7.5 5.0 - 9.0    Protein,  (A) Negative mg/dL    Urobilinogen, Urine 0.2 <2.0 E.U./dL    Nitrite, Urine Negative Negative    Leukocyte Esterase, Urine LARGE (A) Negative    WBC, UA PACKED (A) 0 - 5 /HPF    RBC, UA 0-1 0 - 2 /HPF    Bacteria, UA MANY (A) None Seen /HPF   Troponin   Result Value Ref Range    Troponin, High Sensitivity 139 (H) 0 - 9 ng/L   POCT GLUCOSE   Result Value Ref Range    Glucose 90 mg/dL   POCT Glucose   Result Value Ref Range    Meter Glucose 90 74 - 99 mg/dL   EKG 12 Lead   Result Value Ref Range    Ventricular Rate 76 BPM    Atrial Rate 76 BPM    P-R Interval 248 ms    QRS Duration 140 ms    Q-T Interval 454 ms    QTc Calculation (Bazett) 510 ms    P Axis 3 degrees    R Axis -28 degrees    T Axis 96 degrees       RADIOLOGY:  CT HEAD WO CONTRAST   Final Result   No acute intracranial abnormality.                 ------------------------- NURSING NOTES AND VITALS REVIEWED ---------------------------  Date / Time Roomed:  11/25/2022 12:54 PM  ED Bed Assignment:  26/26    The nursing notes within the ED encounter and vital signs as below have been reviewed.      Patient Vitals for the past 24 hrs:   BP Temp Temp src Pulse Resp SpO2 Height Weight   11/25/22 1735 121/66 98.1 °F (36.7 °C) -- 75 16 99 % -- --   11/25/22 1542 -- -- -- 80 -- -- -- --   11/25/22 1500 116/67 -- -- 79 -- -- -- --   11/25/22 1445 116/67 -- -- 77 -- -- -- --   11/25/22 1430 116/67 -- -- 73 -- -- -- --   11/25/22 1308 116/67 98.2 °F (36.8 °C) Oral 80 16 94 % 5' 1\" (1.549 m) 204 lb (92.5 kg)       Oxygen Saturation Interpretation: Normal      Diagnosis:  1. UTI (urinary tract infection), uncomplicated        Disposition:  Patient's disposition: Admit to med/surg floor  Patient's condition is stable. Attending was present and available throughout encounter including all critical portions;  See Attending Note/Attestation for Final [address] Youssef DO  Resident  11/25/22 2197

## 2022-11-26 LAB
ANION GAP SERPL CALCULATED.3IONS-SCNC: 12 MMOL/L (ref 7–16)
ANISOCYTOSIS: ABNORMAL
BASOPHILIC STIPPLING: ABNORMAL
BASOPHILS ABSOLUTE: 0.1 E9/L (ref 0–0.2)
BASOPHILS RELATIVE PERCENT: 1.7 % (ref 0–2)
BUN BLDV-MCNC: 35 MG/DL (ref 6–23)
CALCIUM SERPL-MCNC: 9.7 MG/DL (ref 8.6–10.2)
CHLORIDE BLD-SCNC: 98 MMOL/L (ref 98–107)
CO2: 27 MMOL/L (ref 22–29)
CREAT SERPL-MCNC: 5.2 MG/DL (ref 0.5–1)
EOSINOPHILS ABSOLUTE: 0.1 E9/L (ref 0.05–0.5)
EOSINOPHILS RELATIVE PERCENT: 1.7 % (ref 0–6)
GFR SERPL CREATININE-BSD FRML MDRD: 8 ML/MIN/1.73
GLUCOSE BLD-MCNC: 98 MG/DL (ref 74–99)
HCT VFR BLD CALC: 29.2 % (ref 34–48)
HEMOGLOBIN: 8.8 G/DL (ref 11.5–15.5)
HYPOCHROMIA: ABNORMAL
LYMPHOCYTES ABSOLUTE: 0.66 E9/L (ref 1.5–4)
LYMPHOCYTES RELATIVE PERCENT: 11.3 % (ref 20–42)
MCH RBC QN AUTO: 35.1 PG (ref 26–35)
MCHC RBC AUTO-ENTMCNC: 30.1 % (ref 32–34.5)
MCV RBC AUTO: 116.3 FL (ref 80–99.9)
METER GLUCOSE: 144 MG/DL (ref 74–99)
METER GLUCOSE: 163 MG/DL (ref 74–99)
METER GLUCOSE: 188 MG/DL (ref 74–99)
METER GLUCOSE: 82 MG/DL (ref 74–99)
MONOCYTES ABSOLUTE: 0.24 E9/L (ref 0.1–0.95)
MONOCYTES RELATIVE PERCENT: 4.4 % (ref 2–12)
MYELOCYTE PERCENT: 0.9 % (ref 0–0)
NEUTROPHILS ABSOLUTE: 4.86 E9/L (ref 1.8–7.3)
NEUTROPHILS RELATIVE PERCENT: 80 % (ref 43–80)
NUCLEATED RED BLOOD CELLS: 0 /100 WBC
PDW BLD-RTO: 18 FL (ref 11.5–15)
PLATELET # BLD: 216 E9/L (ref 130–450)
PMV BLD AUTO: 9.6 FL (ref 7–12)
POIKILOCYTES: ABNORMAL
POLYCHROMASIA: ABNORMAL
POTASSIUM REFLEX MAGNESIUM: 5.4 MMOL/L (ref 3.5–5)
RBC # BLD: 2.51 E12/L (ref 3.5–5.5)
SODIUM BLD-SCNC: 137 MMOL/L (ref 132–146)
TEAR DROP CELLS: ABNORMAL
WBC # BLD: 6 E9/L (ref 4.5–11.5)

## 2022-11-26 PROCEDURE — 36415 COLL VENOUS BLD VENIPUNCTURE: CPT

## 2022-11-26 PROCEDURE — 2060000000 HC ICU INTERMEDIATE R&B

## 2022-11-26 PROCEDURE — 90935 HEMODIALYSIS ONE EVALUATION: CPT

## 2022-11-26 PROCEDURE — 6360000002 HC RX W HCPCS: Performed by: FAMILY MEDICINE

## 2022-11-26 PROCEDURE — 2580000003 HC RX 258: Performed by: FAMILY MEDICINE

## 2022-11-26 PROCEDURE — 6370000000 HC RX 637 (ALT 250 FOR IP): Performed by: INTERNAL MEDICINE

## 2022-11-26 PROCEDURE — 85025 COMPLETE CBC W/AUTO DIFF WBC: CPT

## 2022-11-26 PROCEDURE — 80048 BASIC METABOLIC PNL TOTAL CA: CPT

## 2022-11-26 PROCEDURE — 82962 GLUCOSE BLOOD TEST: CPT

## 2022-11-26 PROCEDURE — 6370000000 HC RX 637 (ALT 250 FOR IP): Performed by: FAMILY MEDICINE

## 2022-11-26 RX ORDER — HYDROCODONE BITARTRATE AND ACETAMINOPHEN 5; 325 MG/1; MG/1
1 TABLET ORAL EVERY 6 HOURS PRN
Status: DISCONTINUED | OUTPATIENT
Start: 2022-11-26 | End: 2022-11-30 | Stop reason: HOSPADM

## 2022-11-26 RX ORDER — HALOPERIDOL 5 MG/ML
5 INJECTION INTRAMUSCULAR EVERY 6 HOURS PRN
Status: DISCONTINUED | OUTPATIENT
Start: 2022-11-26 | End: 2022-11-30 | Stop reason: HOSPADM

## 2022-11-26 RX ORDER — MIDODRINE HYDROCHLORIDE 5 MG/1
TABLET ORAL
Status: DISPENSED
Start: 2022-11-26 | End: 2022-11-27

## 2022-11-26 RX ORDER — MIDODRINE HYDROCHLORIDE 5 MG/1
10 TABLET ORAL
Status: ACTIVE | OUTPATIENT
Start: 2022-11-26 | End: 2022-11-27

## 2022-11-26 RX ORDER — MIDODRINE HYDROCHLORIDE 5 MG/1
10 TABLET ORAL ONCE
Status: COMPLETED | OUTPATIENT
Start: 2022-11-26 | End: 2022-11-26

## 2022-11-26 RX ADMIN — SENNOSIDES 17.2 MG: 8.6 TABLET, FILM COATED ORAL at 23:20

## 2022-11-26 RX ADMIN — INSULIN GLARGINE 6 UNITS: 100 INJECTION, SOLUTION SUBCUTANEOUS at 23:21

## 2022-11-26 RX ADMIN — HYDROCODONE BITARTRATE AND ACETAMINOPHEN 1 TABLET: 5; 325 TABLET ORAL at 05:57

## 2022-11-26 RX ADMIN — MIDODRINE HYDROCHLORIDE 10 MG: 5 TABLET ORAL at 08:55

## 2022-11-26 RX ADMIN — ESCITALOPRAM OXALATE 10 MG: 10 TABLET ORAL at 08:55

## 2022-11-26 RX ADMIN — BUSPIRONE HYDROCHLORIDE 10 MG: 10 TABLET ORAL at 08:55

## 2022-11-26 RX ADMIN — POLYETHYLENE GLYCOL 3350 17 G: 17 POWDER, FOR SOLUTION ORAL at 08:55

## 2022-11-26 RX ADMIN — PROCHLORPERAZINE MALEATE 5 MG: 5 TABLET ORAL at 05:57

## 2022-11-26 RX ADMIN — Medication 10 ML: at 08:56

## 2022-11-26 RX ADMIN — HEPARIN SODIUM 5000 UNITS: 10000 INJECTION INTRAVENOUS; SUBCUTANEOUS at 05:58

## 2022-11-26 RX ADMIN — METOPROLOL SUCCINATE 25 MG: 25 TABLET, EXTENDED RELEASE ORAL at 08:55

## 2022-11-26 RX ADMIN — HYDROCODONE BITARTRATE AND ACETAMINOPHEN 1 TABLET: 5; 325 TABLET ORAL at 23:21

## 2022-11-26 RX ADMIN — GABAPENTIN 300 MG: 300 CAPSULE ORAL at 23:21

## 2022-11-26 RX ADMIN — HEPARIN SODIUM 5000 UNITS: 10000 INJECTION INTRAVENOUS; SUBCUTANEOUS at 23:21

## 2022-11-26 RX ADMIN — BUSPIRONE HYDROCHLORIDE 10 MG: 10 TABLET ORAL at 23:20

## 2022-11-26 RX ADMIN — Medication 2000 UNITS: at 08:55

## 2022-11-26 RX ADMIN — GABAPENTIN 300 MG: 300 CAPSULE ORAL at 08:55

## 2022-11-26 RX ADMIN — HYDROCODONE BITARTRATE AND ACETAMINOPHEN 1 TABLET: 5; 325 TABLET ORAL at 17:07

## 2022-11-26 RX ADMIN — TRAZODONE HYDROCHLORIDE 25 MG: 50 TABLET ORAL at 23:21

## 2022-11-26 RX ADMIN — HALOPERIDOL LACTATE 5 MG: 5 INJECTION, SOLUTION INTRAMUSCULAR at 03:45

## 2022-11-26 RX ADMIN — SENNOSIDES 17.2 MG: 8.6 TABLET, FILM COATED ORAL at 08:55

## 2022-11-26 ASSESSMENT — PAIN SCALES - GENERAL
PAINLEVEL_OUTOF10: 0
PAINLEVEL_OUTOF10: 2
PAINLEVEL_OUTOF10: 7
PAINLEVEL_OUTOF10: 8
PAINLEVEL_OUTOF10: 0
PAINLEVEL_OUTOF10: 8

## 2022-11-26 ASSESSMENT — PAIN - FUNCTIONAL ASSESSMENT: PAIN_FUNCTIONAL_ASSESSMENT: PREVENTS OR INTERFERES WITH MANY ACTIVE NOT PASSIVE ACTIVITIES

## 2022-11-26 ASSESSMENT — PAIN DESCRIPTION - LOCATION
LOCATION: COCCYX;ARM;LEG
LOCATION: ARM;LEG

## 2022-11-26 NOTE — CARE COORDINATION
Social work / Discharge Planning:         Patient is a readmission. She is from [facility] and goes to dialysis at [address] [place] [address]. Will need to confirm plan to return. PT/OT evaluations ordered.    Electronically signed by SHANTA Clement on 11/26/2022 at 8:33 AM

## 2022-11-26 NOTE — ED NOTES
Pt resting with eyes closed, transport at bedside, pt responded to verbal stimulation by stating \" what \" VS rechecked as charted pt stable.       Ela Rosales RN  11/25/22 1921

## 2022-11-26 NOTE — H&P
[id number] [facility]                  [facility] [address]                              HISTORY AND PHYSICAL    PATIENT NAME: Mariusz Dhaliwal                      :        1950  MED REC NO:   [id number]                            ROOM:       0743  ACCOUNT NO:   [de-identified]                           ADMIT DATE: 2022  PROVIDER:     Jeovany Santos MD    CHIEF COMPLAINT:  Altered mental status, possible complicated UTI. HISTORY OF PRESENT ILLNESS:  This is a 67year old with chronic renal  failure, on hemodialysis; history of insulin-dependent diabetes  mellitus; orthostatic hypotension; remote history of sarcoidosis; recent  admission for fracture of her left arm and a severe fracture of the  right femur, who came to the ED from dialysis at the nursing home due to  acute confusion. They also mentioned facial droop, but she had Bell's  palsy about 12 years ago and this is chronic. The patient this morning  is completely alert and oriented, knows who I am, knows where she is. Denies any problems other than arm pain due to her fracture. RECENT AND PRESENT MEDICATIONS:  See med rec in the chart. PAST MEDICAL HISTORY:  As described above. She also has a sacral  decubitus, which is present on admission and is quite chronic. SOCIAL HISTORY:  Does not drink. Does not smoke. Has been residing at  the nursing home since she fractured her leg and arm. ALLERGIES:  None known. REVIEW OF SYSTEMS:  SKIN AND LYMPHATICS:  Sacral decubitus, which is chronic. CENTRAL NERVOUS SYSTEM:  Reported as confused, currently not. Denies  headache, blurred vision, or dizziness. CIRCULATORY:  Denies chest pain, orthopnea, or PND. DIGESTIVE:  Denies nausea, vomiting, diarrhea, melena, hematochezia, or  hematemesis. GENITOURINARY:  Urine looks bad. She does not really have any  complaints as far as dysuria or frequency at this time.   MUSCULOSKELETAL:  Arm and leg pain due to recent fractures. PHYSICAL EXAMINATION:  GENERAL:  She is awake. She is alert x3. She is in no acute distress. VITAL SIGNS:  Temperature 98.3, pulse 74, respirations 16, blood  pressure is 76/54. She does have orthostasis. O2 sat on room air is  92%. SKIN:  Shows some pallor, but no jaundice. Eyes show no icterus. NECK:  Supple without bruits or masses. CHEST:  Clear to auscultation. HEART:  Regular rate and rhythm without murmur, gallop, or rub. ABDOMEN:  Obese. Soft and nontender. EXTREMITIES:  Her right leg is in a hinged knee brace. Left leg reveals  no edema. Left arm is also in a cast.  NEUROLOGICAL:  Once again, she is awake and alert with no focal deficits  at this time. LABORATORY DATA:  Laboratories show potassium slightly high at 5.4,  creatinine is 5.2, BUN 35. Hemoglobin is 8.8, platelet count is  019,223. Urine:  Turbid, small amount of blood, large amount of  leukocyte esterase, many bacteria. DIAGNOSTIC IMPRESSION:  Altered mental status, appears to be resolved,  could be UTI at this point. Chronic kidney failure, on hemodialysis. PLAN:  We will ask ID to see her as far as antibiotic usage. She did  have a very bad case of C. difficile not too long ago due to  antibiotics. Hemodialysis as scheduled. Probably at least observation  one more day and then discharge her back to skilled nursing for ongoing  physical and occupational therapy.         Prateek Rodriguez MD    D: 11/26/2022 9:05:25       T: 11/26/2022 9:07:54     [email or web address]  Job#: [id number]     Doc#: [id number]    CC:

## 2022-11-26 NOTE — CONSULTS
Nephrology Consult  [facility]  Jess Gan MD    CC:   esrd    HPI:   pt is a 66 yo female from a nursing home who was sent in for ms changes. She had hd Friday. She has pmh of esrd, recent fx of r femur and left arm, covid, htn, gerd, dm, djd, OM, uti, decub, orhtostasis. Urine cx has been sent and she was started. Labs show k of 5.4, na 137 co2 27 bun 35, cr 5.2, wbc 6, hgb 8.8, plt 216, alb 3.1. bp was 116/67 on admission and 144/42 last night. Was 76/54 this am and midodrine given. She is getting hd today for k of 5.4 for 3 hrs. Today her ms is improved.      PMH:    Past Medical History:   Diagnosis Date    Anemia in chronic kidney disease     Anxiety and depression     Arthritis     Chronic pain syndrome     COVID-19 05/2020    COVID-19     Decreased dorsalis pedis pulse 10/25/2022    Diabetes mellitus ([address]     Dysphagia, oral phase     ESRD on hemodialysis ([address] 10/25/2022    GERD (gastroesophageal reflux disease)     Hemodialysis patient ([address]     M-W-F    Hyperkalemia     Hypertension     Hypertensive kidney disease with stage 4 chronic kidney disease (HCC)     Insomnia     Kidney stones     MDD (major depressive disorder)     Moderate protein-calorie malnutrition (HCC)     Morbid obesity (HCC)     Muscle weakness (generalized)     Obesity     Pressure injury of sacral region, stage 3 ([address] 10/25/2022    Pressure ulcer of sacral region     Sacral pressure ulcer 08/03/2021    stage 4    Unspecified osteoarthritis, unspecified site     Weakness generalized        Patient Active Problem List   Diagnosis    Neurologic gait dysfunction    Diabetes mellitus ([address]    Hypertension    Arthritis    Knee problem    Anemia due to stage 3 chronic kidney disease    Primary osteoarthritis of both knees    Moderate protein-calorie malnutrition (HCC)    Pressure injury of sacral region, stage 4 (HCC)    Pressure injury of right hip, stage 3 (HCC)    Left bundle branch block    Pure hypercholesterolemia Moderate obesity    Failure to thrive in adult    Pressure injury of sacral region, stage 3 (HCC)    Decreased dorsalis pedis pulse    ESRD on hemodialysis ([facility])    Closed bicondylar fracture of distal femur, right, initial encounter [facility]    Other fracture of right femur, initial encounter for closed fracture ([facility]    Closed fracture of distal ends of left radius and ulna    Closed bicondylar fracture of distal end of right femur ([facility])    Complicated UTI (urinary tract infection)       Meds:     busPIRone  10 mg Oral BID    escitalopram  10 mg Oral Daily    gabapentin  300 mg Oral TID    heparin (porcine)  5,000 Units SubCUTAneous 3 times per day    insulin glargine  6 Units SubCUTAneous Nightly    metoprolol succinate  25 mg Oral Daily    midodrine  15 mg Oral Once per day on Mon Wed Fri    polyethylene glycol  17 g Oral Daily    senna  2 tablet Oral BID    vitamin D  2,000 Units Oral Daily    sodium chloride flush  5-40 mL IntraVENous 2 times per day    insulin lispro  0-8 Units SubCUTAneous TID WC    insulin lispro  0-4 Units SubCUTAneous Nightly        sodium chloride      dextrose         Meds prn:     haloperidol lactate, HYDROcodone 5 mg - acetaminophen, acetaminophen, bisacodyl, magnesium hydroxide, prochlorperazine, traZODone, sodium chloride flush, sodium chloride, glucose, dextrose bolus **OR** dextrose bolus, glucagon (rDNA), dextrose    Meds prior to admission:     No current facility-administered medications on file prior to encounter.      Current Outpatient Medications on File Prior to Encounter   Medication Sig Dispense Refill    busPIRone (BUSPAR) 10 MG tablet Take 1 tablet by mouth 2 times daily      Heparin Sodium, Porcine, (HEPARIN, PORCINE,) 35925 UNIT/ML injection Inject 0.5 mLs into the skin every 8 hours      insulin lispro (HUMALOG) 100 UNIT/ML SOLN injection vial Inject 0-8 Units into the skin 3 times daily (with meals)      insulin lispro (HUMALOG) 100 UNIT/ML SOLN injection vial Inject 0-4 Units into the skin nightly      prochlorperazine (COMPAZINE) 5 MG tablet Take 1 tablet by mouth every 6 hours as needed for Nausea 120 tablet 3    insulin glargine (LANTUS) 100 UNIT/ML injection vial Inject 6 Units into the skin nightly 10 mL 3    glucose 4 g chewable tablet Take 4 tablets by mouth as needed for Low blood sugar 60 tablet 3    epoetin derek-epbx (RETACRIT) 97951 UNIT/ML SOLN injection Inject 1 mL into the skin three times a week 6.6 mL     traZODone (DESYREL) 50 MG tablet Take 25 mg by mouth nightly as needed for Sleep      acetaminophen (TYLENOL) 500 MG tablet Take 1,000 mg by mouth every 8 hours as needed for Pain (moderate pain 4-6)       Ferric Citrate (AURYXIA) 1  MG(Fe) TABS Take 2 tablets by mouth 3 times daily (with meals) Take with meals. midodrine (PROAMATINE) 5 MG tablet Take 15 mg by mouth three times a week M-W-F      bisacodyl (DULCOLAX) 5 MG EC tablet Take 10 mg by mouth daily as needed for Constipation      Vitamin D (CHOLECALCIFEROL) 50 MCG (2000 UT) TABS tablet Take 1 tablet by mouth daily 60 tablet     magnesium hydroxide (MILK OF MAGNESIA) 400 MG/5ML suspension Take 30 mLs by mouth daily as needed for Constipation      metoprolol succinate (TOPROL XL) 25 MG extended release tablet Take 1 tablet by mouth daily 30 tablet 3    escitalopram (LEXAPRO) 10 MG tablet Take 10 mg by mouth daily      gabapentin (NEURONTIN) 300 MG capsule Take 300 mg by mouth 3 times daily. polyethylene glycol (GLYCOLAX) 17 g packet Take 17 g by mouth daily      senna (SENOKOT) 8.6 MG tablet Take 2 tablets by mouth 2 times daily         Allergies:    Patient has no known allergies. Social History:     reports that she quit smoking about 11 years ago. She has a 30.00 pack-year smoking history. She has never used smokeless tobacco. She reports that she does not drink alcohol and does not use drugs.     Family History:         Problem Relation Age of Onset    Cancer Sister        ROS: General: no fever, chills   Heent: no nasal congestion, sore throat   Resp: no cough, sob , hemoptysis  Cardiac: no cp , le edema, palpitations  Gi: no nausea, vomiting, melena, abd pain, hematemesis  Gu: no hematuria, dysuria   Neruo: no numbness, weakness, headache, blurry vision   Endocrine:  no h/o dm  Derm: no rash , petechia  Heme: no epistaxis, bruising  All other sx negative     Physical Exam:      Patient Vitals for the past 24 hrs:   BP Temp Temp src Pulse Resp SpO2 Height Weight   11/26/22 0808 -- -- -- -- -- -- 5' 1\" (1.549 m) --   11/26/22 0745 (!) 76/54 98.3 °F (36.8 °C) Oral 74 16 92 % -- --   11/26/22 0205 -- -- -- -- -- -- -- 204 lb 9.6 oz (92.8 kg)   11/1950 (!) 144/42 98.8 °F (37.1 °C) Oral 74 16 99 % -- --   11/25/22 1900 118/68 98 °F (36.7 °C) Oral 84 16 99 % -- --   11/25/22 1735 121/66 98.1 °F (36.7 °C) -- 75 16 99 % -- --   11/25/22 1542 -- -- -- 80 -- -- -- --   11/25/22 1500 116/67 -- -- 79 -- -- -- --   11/25/22 1445 116/67 -- -- 77 -- -- -- --   11/25/22 1430 116/67 -- -- 73 -- -- -- --   11/25/22 1308 116/67 98.2 °F (36.8 °C) Oral 80 16 94 % 5' 1\" (1.549 m) 204 lb (92.5 kg)       No intake or output data in the 24 hours ending 11/26/22 1210    Constitutional: Patient in no acute distress   Head: normocephalic, atraumatic   Neck: supple, no jvd  Cardiovascular: regular rate and rhythm, no murmurs, gallops, or rubs   Respiratory: Clear, no rales, rhochi, or wheezes,   Gastrointestinal: soft, nontender, nondistended, no hepatosplenomegaly  Ext: no edema  Neuro: aaox3  Skin: dry, no rash   Back: nontender    Data:    Recent Labs     11/25/22  1438 11/26/22  0702   WBC 7.1 6.0   HGB 9.4* 8.8*   HCT 30.7* 29.2*   .8* 116.3*    216       Recent Labs     11/25/22  1438 11/25/22  1500 11/26/22  0702     --  137   K 5.1*  --  5.4*   CL 97*  --  98   CO2 28  --  27   CREATININE 4.2*  --  5.2*   BUN 27*  --  35*   LABGLOM 11  --  8   GLUCOSE 78 90 98   CALCIUM 9.8  -- 9.7       Vit D, 25-Hydroxy   Date Value Ref Range Status   11/11/2022 32 30 - 100 ng/mL Final     Comment:     <20 ng/mL. ........... Qian Singh Deficient  20-30 ng/mL. ......... Qian Singh Insufficient   ng/mL. ........ Qian Singh Sufficient  >100 ng/mL. .......... Qian Singh Toxic         PTH   Date Value Ref Range Status   11/06/2022 466 (H) 15 - 65 pg/mL Final       Recent Labs     11/25/22  1438   ALT 9   AST 20   ALKPHOS 140*   BILITOT 0.4       Recent Labs     11/25/22  1438   LABALBU 3.1*       Ferritin   Date Value Ref Range Status   05/05/2021 767 ng/mL Final     Comment:     FERRITIN Reference Ranges:  Adult Males   20 - 60 years:    27 - 400 ng/mL  Adult females 16 - 61 years:    15 - 150 ng/mL  Adults greater than 60 years:   no established reference range  Pediatrics:                     no established reference range       Iron   Date Value Ref Range Status   05/05/2021 74 37 - 145 mcg/dL Final     TIBC   Date Value Ref Range Status   05/05/2021 116 (L) 250 - 450 mcg/dL Final       Vitamin B-12   Date Value Ref Range Status   05/05/2021 895 211 - 946 pg/mL Final       Folate   Date Value Ref Range Status   05/05/2021 >20.0 4.8 - 24.2 ng/mL Final         Lab Results   Component Value Date/Time    COLORU Yellow 11/25/2022 02:38 PM    NITRU Negative 11/25/2022 02:38 PM    GLUCOSEU Negative 11/25/2022 02:38 PM    GLUCOSEU NEGATIVE 08/17/2011 10:30 PM    KETUA Negative 11/25/2022 02:38 PM    UROBILINOGEN 0.2 11/25/2022 02:38 PM    BILIRUBINUR Negative 11/25/2022 02:38 PM    BILIRUBINUR NEGATIVE 08/17/2011 10:30 PM       No results found for: MAGDA, CREURRAN, MACREATRATIO, OSMOU    No components found for: URIC    No results found for: LIPIDPAN      Assessment and Plan:    Esrd  Hd today for k of 5.4  Then resume usual schedule mwf  Check phos pth    2. Ms changes  Improved  Urine cx sent   On rocephin  Ua: turbid, sm ld, lg LE, packed wbc, many bact, protein 100  Head ct no abnl    3. Orthostatic hypotension  Continue midodrine    4.  Anemia  In setting of esrd  Continue lorie    Grant Martin.  Samanta Sommer MD

## 2022-11-26 NOTE — CONSULTS
Comprehensive Nutrition Assessment    Type and Reason for Visit:  Initial, Consult, Wound    Nutrition Recommendations/Plan:   Recommend K+ diet restriction and carb controlled diet prn  Will add Jono BID and Gelatein BID     Malnutrition Assessment:  Malnutrition Status:  Insufficient data (11/26/22 9846)    Context:  Chronic Illness     Findings of the 6 clinical characteristics of malnutrition:  Energy Intake:  Unable to assess  Weight Loss:  Unable to assess (d/t ESRD)     Body Fat Loss:  No significant body fat loss     Muscle Mass Loss:  No significant muscle mass loss    Fluid Accumulation:  Unable to assess (multifactorial)     Strength:  Not Performed    Nutrition Assessment:    Pt from dialysis w/ slurred speech admits w/ UTI. Hx DM, ESRD on HD, recent hx s/p R & L distal radius open reduction. Will add ONS in the setting of wounds & known losses via HD. Nutrition Related Findings:    A&O X1, LLE +1 edema, abd soft/obese, +BS, constipation, K+ 5.4, +HD Wound Type: Pressure Injury, Wound Consult Pending       Current Nutrition Intake & Therapies:    Average Meal Intake: Unable to assess (pt new admit at this time)  Average Supplements Intake: None Ordered  ADULT DIET; Regular    Anthropometric Measures:  Height: 5' 1\" (154.9 cm)  Ideal Body Weight (IBW): 105 lbs (48 kg)       Current Body Weight: 204 lb 9.6 oz (92.8 kg) (11/26 actual), 194.9 % IBW.     Current BMI (kg/m2): 38.7  Usual Body Weight: 205 lb (93 kg) (12/2021 actual per EMR)  % Weight Change (Calculated): -0.2  Weight Adjustment For: No Adjustment                 BMI Categories: Obese Class 2 (BMI 35.0 -39.9)    Estimated Daily Nutrient Needs:  Energy Requirements Based On: Formula  Weight Used for Energy Requirements: Current  Energy (kcal/day):   Weight Used for Protein Requirements: Ideal  Protein (g/day): 85-95 (1.8-2)  Method Used for Fluid Requirements: Standard Renal  Fluid (ml/day): per renal    Nutrition Diagnosis: Increased nutrient needs related to increase demand for energy/nutrients as evidenced by wounds, dialysis    Nutrition Interventions:   Food and/or Nutrient Delivery: Start Oral Nutrition Supplement (Recommend K+ restriction diet)  Nutrition Education/Counseling: Education not appropriate  Coordination of Nutrition Care: Continue to monitor while inpatient       Goals:     Goals: Meet at least 75% of estimated needs, by next RD assessment       Nutrition Monitoring and Evaluation:      Food/Nutrient Intake Outcomes: Food and Nutrient Intake, Supplement Intake  Physical Signs/Symptoms Outcomes: Biochemical Data, GI Status, Fluid Status or Edema, Nutrition Focused Physical Findings, Skin, Weight, Constipation    Discharge Planning:    Continue Oral Nutrition Supplement     Bob Souza RD, LD  Contact: [phone]

## 2022-11-26 NOTE — FLOWSHEET NOTE
11/26/22 1600   Vital Signs   BP (!) 160/48   Temp 97.9 °F (36.6 °C)   Heart Rate 67   Resp 16   Weight 201 lb 1 oz (91.2 kg)   Weight Method Bed scale   Percent Weight Change -1.72   Pain Assessment   Pain Assessment 0-10   Pain Level 2   Post-Hemodialysis Assessment   Post-Treatment Procedures Blood returned; Access bleeding time < 10 minutes   Machine Disinfection Process Acid/Vinegar Clean;Heat Disinfect; Exterior Machine Disinfection   Rinseback Volume (ml) 300 ml   Blood Volume Processed (Liters) 42.9 l/min   Dialyzer Clearance Lightly streaked   Duration of Treatment (minutes) 136 minutes   Hemodialysis Output (ml) 1100 ml   Tolerated Treatment Fair   Patient Response to Treatment pt stated \"she's had enough of dialysis\" and requesting to stop tx; Dr. Deng Paiz notified; ok to term tx; blood returned; stasis achieved; post report to [address] [place] [address]; stable at discharge   Bilateral Breath Sounds Clear   Time Off 1557   Patient Disposition Return to room

## 2022-11-26 NOTE — PROGRESS NOTES
Pulse ox was __94___% on room air at rest.  Ambulated patient on room air. Oxygen saturation was ____82__% on room air while ambulating. Oxygen applied. Recovery pulse ox was __92____% on ___4____ liters of oxygen while ambulating.

## 2022-11-26 NOTE — CONSULTS
[address] [place] [address] [facility]    Consultation Note     Admit Date: 11/25/2022 12:54 PM    Reason for Consult:   Complicated UTI    Attending Physician:  Tavon Hilario MD     Chief Complaint: confusion. HISTORY OF PRESENT ILLNESS:   The patient is a 67 y.o.  female  known to the Infectious Diseases service. The patient has hx of is ESRD on hemodialysis with left upper extremity fistula, decub wound, recent fall by tripping over a walker and fractured her right leg and left hand. She is immobilized in her right leg. Has been in a nursing home since then. She has been getting Percocet at the nursing home and found to be acutely confused last night so she was sent to the ER. Patient denies any fever cough shortness of breath or abdominal pain or diarrhea. She has black stools because she is on iron supplementation. She denies any urinary complaints she does urinate intermittently no lower abdominal pain or flank pain. Since admission she is afebrile hemodynamically stable saturating well on room air. Labs showed normal white count at 6 hemoglobin of 8.8 platelet count of 939. BMP showed CKD, alk phos is elevated at 140 ALT AST is normal.  UA showed pyuria CT head was normal.  Patient got a dose of ceftriaxone and I got consult for the recommendations.     Past Medical History:        Diagnosis Date    Anemia in chronic kidney disease     Anxiety and depression     Arthritis     Chronic pain syndrome     COVID-19 05/2020    COVID-19     Decreased dorsalis pedis pulse 10/25/2022    Diabetes mellitus ([address]     Dysphagia, oral phase     ESRD on hemodialysis ([address] 10/25/2022    GERD (gastroesophageal reflux disease)     Hemodialysis patient ([address]     M-W-F    Hyperkalemia     Hypertension     Hypertensive kidney disease with stage 4 chronic kidney disease (HCC)     Insomnia     Kidney stones     MDD (major depressive disorder)     Moderate protein-calorie malnutrition ([address]     Morbid obesity (HCC)     Muscle weakness (generalized)     Obesity     Pressure injury of sacral region, stage 3 ([address] 10/25/2022    Pressure ulcer of sacral region     Sacral pressure ulcer 08/03/2021    stage 4    Unspecified osteoarthritis, unspecified site     Weakness generalized      Past Surgical History:        Procedure Laterality Date    ABDOMEN SURGERY N/A 5/4/2020    SACRAL WOUND DEBRIDEMENT CALL   WITH TIME AVAIL AM performed by Malena Flores MD at [address] [email or web address]    CHOLECYSTECTOMY  3/31/16    Laparoscopic-Dr. Dulce Maria Talavera  2011     for kidney stones    DIALYSIS FISTULA CREATION Left 8/5/2021    INSERTION FISTULA LEFT ARM performed by Siena Carbajal MD at [address] Right 11/18/2021    REVISION AV FISTULA LEFT ARM performed by Siena Carbajal MD at [place] [address] Right 11/4/2022    RIGHT DISTAL FEMUR OPEN REDUCTION INTERNAL FIXATION ++SYNTHES++ performed by Elmer Morrison MD at [place] [address]  2009     right     UPPER GASTROINTESTINAL ENDOSCOPY  2.18.15    Dr. Nasir Hummel Findings: Mild Gastrits and Duodenitis, 2cm Hiatal Hernia    UPPER GASTROINTESTINAL ENDOSCOPY N/A 5/8/2020    EGD CONTROL HEMORRHAGE performed by Malena Flores MD at [facility] N/A 5/22/2020    EGD BEDSIDE performed by Betsy Rios MD at [place] [address] 5/6/2021    INSERTION TUNNELED DIALYSIS CATHETER performed by Siena Carbajal MD at [address] [facility] Left 11/4/2022    LEFT DISTAL RADIUS OPEN REDUCTION INTERNAL FIXATION    ++SYNTHES++ performed by Elmer Morrison MD at [facility] OR     Current Medications:   Scheduled Meds:   midodrine  10 mg Oral Once in dialysis    midodrine        busPIRone  10 mg Oral BID    escitalopram  10 mg Oral Daily    gabapentin  300 mg Oral TID    heparin (porcine)  5,000 Units SubCUTAneous 3 times per day    insulin glargine  6 Units SubCUTAneous Nightly    metoprolol succinate  25 mg Oral Daily    midodrine  15 mg Oral Once per day on     polyethylene glycol  17 g Oral Daily    senna  2 tablet Oral BID    vitamin D  2,000 Units Oral Daily    sodium chloride flush  5-40 mL IntraVENous 2 times per day    insulin lispro  0-8 Units SubCUTAneous TID     insulin lispro  0-4 Units SubCUTAneous Nightly     Continuous Infusions:   sodium chloride      dextrose       PRN Meds:haloperidol lactate, HYDROcodone 5 mg - acetaminophen, acetaminophen, bisacodyl, magnesium hydroxide, prochlorperazine, traZODone, sodium chloride flush, sodium chloride, glucose, dextrose bolus **OR** dextrose bolus, glucagon (rDNA), dextrose    Allergies:  Patient has no known allergies. Social History:   Social History     Socioeconomic History    Marital status:    Tobacco Use    Smoking status: Former     Packs/day: 1.00     Years: 30.00     Pack years: 30.00     Types: Cigarettes     Quit date: 2011     Years since quittin.2    Smokeless tobacco: Never   Vaping Use    Vaping Use: Never used   Substance and Sexual Activity    Alcohol use: No    Drug use: No    Sexual activity: Not Currently       Family History:       Problem Relation Age of Onset    Cancer Sister    . Otherwise non-pertinent to the chief complaint. REVIEW OF SYSTEMS:    As mentioned in HPI, all other systems negative. PHYSICAL EXAM:    Vitals:    BP (!) 140/48   Pulse 70   Temp 97.2 °F (36.2 °C)   Resp 16   Ht 5' 1\" (1.549 m)   Wt 204 lb 9.4 oz (92.8 kg)   SpO2 92%   BMI 38.66 kg/m²   Constitutional: The patient is awake, alert, and oriented. Skin: Warm and dry. Has a decubitus wound which looks clean no sign of infection no jaundice. HEENT: Eyes show round, and reactive pupils. Moist mucous membranes, no ulcerations, no thrush. Neck: Supple to movements. No lymphadenopathy.    Chest: Clear to auscultation  Cardiovascular: S1 and S2 are rhythmic and regular. No murmurs appreciated. Abdomen: Soft nontender  Extremities: No clubbing, no cyanosis, no edema.   Musculoskeletal: Left hand in a dressing and right lower extremity is in a cast.  Neurological: Alert, Oriented x3  Lines: peripheral, left upper extremity fistula      CBC+dif:  Recent Labs     11/25/22  1438 11/26/22  0702   WBC 7.1 6.0   HGB 9.4* 8.8*   HCT 30.7* 29.2*   .8* 116.3*    216   NEUTROABS 5.39 4.86     Lab Results   Component Value Date    CRP 21.1 (H) 04/12/2022    CRP 19.3 (H) 05/24/2020    CRP 15.8 (H) 05/21/2020     No results found for: CRP  Lab Results   Component Value Date    SEDRATE 123 (H) 04/12/2022    SEDRATE 45 (H) 08/13/2019    SEDRATE 35 (H) 08/16/2013     Lab Results   Component Value Date    ALT 9 11/25/2022    AST 20 11/25/2022    ALKPHOS 140 (H) 11/25/2022    BILITOT 0.4 11/25/2022     Lab Results   Component Value Date/Time     11/26/2022 07:02 AM    K 5.4 11/26/2022 07:02 AM    CL 98 11/26/2022 07:02 AM    CO2 27 11/26/2022 07:02 AM    BUN 35 11/26/2022 07:02 AM    CREATININE 5.2 11/26/2022 07:02 AM    GFRAA 7 10/12/2022 03:49 AM    LABGLOM 8 11/26/2022 07:02 AM    GLUCOSE 98 11/26/2022 07:02 AM    GLUCOSE 149 10/12/2011 09:59 AM    PROT 6.5 11/25/2022 02:38 PM    LABALBU 3.1 11/25/2022 02:38 PM    LABALBU 4.1 10/12/2011 09:59 AM    CALCIUM 9.7 11/26/2022 07:02 AM    BILITOT 0.4 11/25/2022 02:38 PM    ALKPHOS 140 11/25/2022 02:38 PM    AST 20 11/25/2022 02:38 PM    ALT 9 11/25/2022 02:38 PM       Lab Results   Component Value Date/Time    PROTIME 10.6 11/23/2022 04:26 AM    PROTIME 15.3 06/25/2011 07:00 PM    INR 1.0 11/23/2022 04:26 AM       Lab Results   Component Value Date/Time    TSH 1.945 08/16/2013 05:00 AM       Lab Results   Component Value Date/Time    COLORU Yellow 11/25/2022 02:38 PM    PHUR 7.5 11/25/2022 02:38 PM    WBCUA PACKED 11/25/2022 02:38 PM    WBCUA 5-10 08/17/2011 10:30 PM    RBCUA 0-1 11/25/2022 02:38 PM    RBCUA 0-1 06/26/2013 03:46 PM    YEAST Present 08/11/2020 11:36 AM    BACTERIA MANY 11/25/2022 02:38 PM    CLARITYU TURBID 11/25/2022 02:38 PM    SPECGRAV 1.020 11/25/2022 02:38 PM    LEUKOCYTESUR LARGE 11/25/2022 02:38 PM    UROBILINOGEN 0.2 11/25/2022 02:38 PM    BILIRUBINUR Negative 11/25/2022 02:38 PM    BILIRUBINUR NEGATIVE 08/17/2011 10:30 PM    BLOODU SMALL 11/25/2022 02:38 PM    GLUCOSEU Negative 11/25/2022 02:38 PM    GLUCOSEU NEGATIVE 08/17/2011 10:30 PM    AMORPHOUS FEW 06/25/2011 08:50 PM       Lab Results   Component Value Date/Time    NMG2ABF 21.8 05/09/2020 04:02 PM    J8LLDMYE 97.4 06/27/2011 02:48 AM    TUF3RIK 43.7 05/09/2020 04:02 PM    PO2ART 110.4 05/09/2020 04:02 PM     Radiology:  CT HEAD WO CONTRAST   Final Result   No acute intracranial abnormality. Microbiology:  Pending  No results for input(s): BC in the last 72 hours. No results for input(s): ORG in the last 72 hours. No results for input(s): Doreene Chancy in the last 72 hours. No results for input(s): STREPNEUMAGU in the last 72 hours. No results for input(s): LP1UAG in the last 72 hours. No results for input(s): ASO in the last 72 hours. No results for input(s): CULTRESP in the last 72 hours. Assessment:  Acute confusion like related to Percocet use. Asymptomatic bacteriuria  ESRD on hemodialysis. Plan:    No need for further antibiotics. Monitor labs  Will follow with you    Thank you for having us see this patient in consultation. I will be discussing this case with the treating physicians.     Electronically signed by Myah Peter MD on 11/26/2022 at 3:43 PM

## 2022-11-27 PROBLEM — N39.0 COMPLICATED UTI (URINARY TRACT INFECTION): Status: RESOLVED | Noted: 2022-11-25 | Resolved: 2022-11-27

## 2022-11-27 LAB
METER GLUCOSE: 102 MG/DL (ref 74–99)
METER GLUCOSE: 184 MG/DL (ref 74–99)
METER GLUCOSE: 349 MG/DL (ref 74–99)
METER GLUCOSE: 455 MG/DL (ref 74–99)
METER GLUCOSE: 98 MG/DL (ref 74–99)
SARS-COV-2, NAAT: DETECTED

## 2022-11-27 PROCEDURE — 87635 SARS-COV-2 COVID-19 AMP PRB: CPT

## 2022-11-27 PROCEDURE — 6370000000 HC RX 637 (ALT 250 FOR IP): Performed by: FAMILY MEDICINE

## 2022-11-27 PROCEDURE — 5A1D70Z PERFORMANCE OF URINARY FILTRATION, INTERMITTENT, LESS THAN 6 HOURS PER DAY: ICD-10-PCS | Performed by: INTERNAL MEDICINE

## 2022-11-27 PROCEDURE — 97530 THERAPEUTIC ACTIVITIES: CPT

## 2022-11-27 PROCEDURE — 82962 GLUCOSE BLOOD TEST: CPT

## 2022-11-27 PROCEDURE — 2060000000 HC ICU INTERMEDIATE R&B

## 2022-11-27 PROCEDURE — 97161 PT EVAL LOW COMPLEX 20 MIN: CPT

## 2022-11-27 PROCEDURE — 6360000002 HC RX W HCPCS: Performed by: FAMILY MEDICINE

## 2022-11-27 PROCEDURE — 2580000003 HC RX 258: Performed by: FAMILY MEDICINE

## 2022-11-27 RX ADMIN — SENNOSIDES 17.2 MG: 8.6 TABLET, FILM COATED ORAL at 21:29

## 2022-11-27 RX ADMIN — SENNOSIDES 17.2 MG: 8.6 TABLET, FILM COATED ORAL at 09:22

## 2022-11-27 RX ADMIN — ESCITALOPRAM OXALATE 10 MG: 10 TABLET ORAL at 09:22

## 2022-11-27 RX ADMIN — Medication 10 ML: at 09:23

## 2022-11-27 RX ADMIN — POLYETHYLENE GLYCOL 3350 17 G: 17 POWDER, FOR SOLUTION ORAL at 09:22

## 2022-11-27 RX ADMIN — GABAPENTIN 300 MG: 300 CAPSULE ORAL at 21:29

## 2022-11-27 RX ADMIN — HEPARIN SODIUM 5000 UNITS: 10000 INJECTION INTRAVENOUS; SUBCUTANEOUS at 13:43

## 2022-11-27 RX ADMIN — BUSPIRONE HYDROCHLORIDE 10 MG: 10 TABLET ORAL at 21:29

## 2022-11-27 RX ADMIN — METOPROLOL SUCCINATE 25 MG: 25 TABLET, EXTENDED RELEASE ORAL at 09:22

## 2022-11-27 RX ADMIN — HEPARIN SODIUM 5000 UNITS: 10000 INJECTION INTRAVENOUS; SUBCUTANEOUS at 09:22

## 2022-11-27 RX ADMIN — BUSPIRONE HYDROCHLORIDE 10 MG: 10 TABLET ORAL at 09:22

## 2022-11-27 RX ADMIN — GABAPENTIN 300 MG: 300 CAPSULE ORAL at 13:43

## 2022-11-27 RX ADMIN — GABAPENTIN 300 MG: 300 CAPSULE ORAL at 09:22

## 2022-11-27 RX ADMIN — Medication 10 ML: at 00:32

## 2022-11-27 RX ADMIN — INSULIN LISPRO 6 UNITS: 100 INJECTION, SOLUTION INTRAVENOUS; SUBCUTANEOUS at 16:20

## 2022-11-27 RX ADMIN — HEPARIN SODIUM 5000 UNITS: 10000 INJECTION INTRAVENOUS; SUBCUTANEOUS at 21:41

## 2022-11-27 RX ADMIN — HYDROCODONE BITARTRATE AND ACETAMINOPHEN 1 TABLET: 5; 325 TABLET ORAL at 09:21

## 2022-11-27 RX ADMIN — Medication 2000 UNITS: at 09:22

## 2022-11-27 ASSESSMENT — PAIN - FUNCTIONAL ASSESSMENT: PAIN_FUNCTIONAL_ASSESSMENT: PREVENTS OR INTERFERES WITH MANY ACTIVE NOT PASSIVE ACTIVITIES

## 2022-11-27 ASSESSMENT — PAIN DESCRIPTION - ORIENTATION: ORIENTATION: LEFT;RIGHT

## 2022-11-27 ASSESSMENT — PAIN SCALES - GENERAL
PAINLEVEL_OUTOF10: 7
PAINLEVEL_OUTOF10: 0
PAINLEVEL_OUTOF10: 0
PAINLEVEL_OUTOF10: 2

## 2022-11-27 ASSESSMENT — PAIN DESCRIPTION - LOCATION: LOCATION: ARM;LEG

## 2022-11-27 NOTE — PROGRESS NOTES
Nephrology Consult  [facility]  Arturo Gan MD    CC:   esrd    HPI:   pt is a 68 yo female from a nursing home who was sent in for ms changes. She had hd Friday. She has pmh of esrd, recent fx of r femur and left arm, covid, htn, gerd, dm, djd, OM, uti, decub, orhtostasis. Urine cx has been sent and she was started. Labs show k of 5.4, na 137 co2 27 bun 35, cr 5.2, wbc 6, hgb 8.8, plt 216, alb 3.1. bp was 116/67 on admission and 144/42 last night. Was 76/54 this am and midodrine given. She is getting hd today for k of 5.4 for 3 hrs. Today her ms is improved. 11/27: pt sp hd yesterday, she refused to stay for the whole treatment.  No cp or sob today    PMH:    Past Medical History:   Diagnosis Date    Anemia in chronic kidney disease     Anxiety and depression     Arthritis     Chronic pain syndrome     COVID-19 05/2020    COVID-19     Decreased dorsalis pedis pulse 10/25/2022    Diabetes mellitus ([address]     Dysphagia, oral phase     ESRD on hemodialysis ([address] 10/25/2022    GERD (gastroesophageal reflux disease)     Hemodialysis patient ([address]     M-W-F    Hyperkalemia     Hypertension     Hypertensive kidney disease with stage 4 chronic kidney disease (HCC)     Insomnia     Kidney stones     MDD (major depressive disorder)     Moderate protein-calorie malnutrition (HCC)     Morbid obesity (HCC)     Muscle weakness (generalized)     Obesity     Pressure injury of sacral region, stage 3 ([address] 10/25/2022    Pressure ulcer of sacral region     Sacral pressure ulcer 08/03/2021    stage 4    Unspecified osteoarthritis, unspecified site     Weakness generalized        Patient Active Problem List   Diagnosis    Neurologic gait dysfunction    Diabetes mellitus ([address]    Hypertension    Arthritis    Knee problem    Anemia due to stage 3 chronic kidney disease    Primary osteoarthritis of both knees    Moderate protein-calorie malnutrition (HCC)    Pressure injury of sacral region, stage 4 (HCC)    Pressure injury of right hip, stage 3 ([facility])    Left bundle branch block    Moderate obesity    Failure to thrive in adult    Pressure injury of sacral region, stage 3 ([facility])    Decreased dorsalis pedis pulse    ESRD on hemodialysis ([facility])    Closed bicondylar fracture of distal femur, right, initial encounter [facility]    Other fracture of right femur, initial encounter for closed fracture ([facility]    Closed fracture of distal ends of left radius and ulna    Closed bicondylar fracture of distal end of right femur ([facility])       Meds:     busPIRone  10 mg Oral BID    escitalopram  10 mg Oral Daily    gabapentin  300 mg Oral TID    heparin (porcine)  5,000 Units SubCUTAneous 3 times per day    insulin glargine  6 Units SubCUTAneous Nightly    metoprolol succinate  25 mg Oral Daily    midodrine  15 mg Oral Once per day on Mon Wed Fri    polyethylene glycol  17 g Oral Daily    senna  2 tablet Oral BID    vitamin D  2,000 Units Oral Daily    sodium chloride flush  5-40 mL IntraVENous 2 times per day    insulin lispro  0-8 Units SubCUTAneous TID WC    insulin lispro  0-4 Units SubCUTAneous Nightly        sodium chloride      dextrose         Meds prn:     haloperidol lactate, HYDROcodone 5 mg - acetaminophen, acetaminophen, bisacodyl, magnesium hydroxide, prochlorperazine, traZODone, sodium chloride flush, sodium chloride, glucose, dextrose bolus **OR** dextrose bolus, glucagon (rDNA), dextrose    Meds prior to admission:     No current facility-administered medications on file prior to encounter.      Current Outpatient Medications on File Prior to Encounter   Medication Sig Dispense Refill    busPIRone (BUSPAR) 10 MG tablet Take 1 tablet by mouth 2 times daily      Heparin Sodium, Porcine, (HEPARIN, PORCINE,) 06176 UNIT/ML injection Inject 0.5 mLs into the skin every 8 hours      insulin lispro (HUMALOG) 100 UNIT/ML SOLN injection vial Inject 0-8 Units into the skin 3 times daily (with meals)      insulin lispro (HUMALOG) 100 UNIT/ML SOLN injection vial Inject 0-4 Units into the skin nightly      prochlorperazine (COMPAZINE) 5 MG tablet Take 1 tablet by mouth every 6 hours as needed for Nausea 120 tablet 3    insulin glargine (LANTUS) 100 UNIT/ML injection vial Inject 6 Units into the skin nightly 10 mL 3    glucose 4 g chewable tablet Take 4 tablets by mouth as needed for Low blood sugar 60 tablet 3    epoetin deerk-epbx (RETACRIT) 09858 UNIT/ML SOLN injection Inject 1 mL into the skin three times a week 6.6 mL     traZODone (DESYREL) 50 MG tablet Take 25 mg by mouth nightly as needed for Sleep      acetaminophen (TYLENOL) 500 MG tablet Take 1,000 mg by mouth every 8 hours as needed for Pain (moderate pain 4-6)       Ferric Citrate (AURYXIA) 1  MG(Fe) TABS Take 2 tablets by mouth 3 times daily (with meals) Take with meals. midodrine (PROAMATINE) 5 MG tablet Take 15 mg by mouth three times a week M-W-F      bisacodyl (DULCOLAX) 5 MG EC tablet Take 10 mg by mouth daily as needed for Constipation      Vitamin D (CHOLECALCIFEROL) 50 MCG (2000 UT) TABS tablet Take 1 tablet by mouth daily 60 tablet     magnesium hydroxide (MILK OF MAGNESIA) 400 MG/5ML suspension Take 30 mLs by mouth daily as needed for Constipation      metoprolol succinate (TOPROL XL) 25 MG extended release tablet Take 1 tablet by mouth daily 30 tablet 3    escitalopram (LEXAPRO) 10 MG tablet Take 10 mg by mouth daily      gabapentin (NEURONTIN) 300 MG capsule Take 300 mg by mouth 3 times daily. polyethylene glycol (GLYCOLAX) 17 g packet Take 17 g by mouth daily      senna (SENOKOT) 8.6 MG tablet Take 2 tablets by mouth 2 times daily         Allergies:    Patient has no known allergies. Social History:     reports that she quit smoking about 11 years ago. She has a 30.00 pack-year smoking history. She has never used smokeless tobacco. She reports that she does not drink alcohol and does not use drugs.     Family History:         Problem Relation Age of Onset    Cancer Sister ROS:     General: no fever, chills   Heent: no nasal congestion, sore throat   Resp: no cough, sob , hemoptysis  Cardiac: no cp , le edema, palpitations  Gi: no nausea, vomiting, melena, abd pain, hematemesis  Gu: no hematuria, dysuria   Neruo: no numbness, weakness, headache, blurry vision   Endocrine:  no h/o dm  Derm: no rash , petechia  Heme: no epistaxis, bruising  All other sx negative     Physical Exam:      Patient Vitals for the past 24 hrs:   BP Temp Temp src Pulse Resp SpO2 Weight   11/27/22 0830 105/61 97.8 °F (36.6 °C) Oral 76 16 96 % --   11/26/22 2319 (!) 128/42 98.2 °F (36.8 °C) -- 66 16 97 % --   11/26/22 1707 -- -- -- -- 16 -- --   11/26/22 1630 118/85 98.5 °F (36.9 °C) Oral 70 16 93 % --   11/26/22 1600 (!) 160/48 97.9 °F (36.6 °C) -- 67 16 -- 201 lb 1 oz (91.2 kg)   11/26/22 1530 138/79 -- -- 72 -- -- --   11/26/22 1500 (!) 140/48 -- -- 70 -- -- --   11/26/22 1430 (!) 138/36 -- -- 70 -- -- --   11/26/22 1400 (!) 118/48 -- -- 70 -- -- --   11/26/22 1340 (!) 116/54 -- -- 66 -- -- --   11/26/22 1300 (!) 118/58 97.2 °F (36.2 °C) -- 72 16 -- 204 lb 9.4 oz (92.8 kg)         Intake/Output Summary (Last 24 hours) at 11/27/2022 1019  Last data filed at 11/26/2022 1600  Gross per 24 hour   Intake --   Output 1100 ml   Net -1100 ml       Constitutional: Patient in no acute distress   Head: normocephalic, atraumatic   Neck: supple, no jvd  Cardiovascular: regular rate and rhythm, no murmurs, gallops, or rubs   Respiratory: Clear, no rales, rhochi, or wheezes,   Gastrointestinal: soft, nontender, nondistended, no hepatosplenomegaly  Ext: no edema  Neuro: aaox3  Skin: dry, no rash   Back: nontender    Data:    Recent Labs     11/25/22  1438 11/26/22  0702   WBC 7.1 6.0   HGB 9.4* 8.8*   HCT 30.7* 29.2*   .8* 116.3*    216       Recent Labs     11/25/22  1438 11/25/22  1500 11/26/22  0702     --  137   K 5.1*  --  5.4*   CL 97*  --  98   CO2 28  --  27   CREATININE 4.2*  --  5.2*   BUN 27*  --  35*   LABGLOM 11  --  8   GLUCOSE 78 90 98   CALCIUM 9.8  --  9.7       Vit D, 25-Hydroxy   Date Value Ref Range Status   11/11/2022 32 30 - 100 ng/mL Final     Comment:     <20 ng/mL. ........... Qian Singh Deficient  20-30 ng/mL. ......... Neal Francisco Insufficient   ng/mL. ........ Neal [facility] Sufficient  >100 ng/mL. .......... Qian Francisco Toxic         PTH   Date Value Ref Range Status   11/06/2022 466 (H) 15 - 65 pg/mL Final       Recent Labs     11/25/22  1438   ALT 9   AST 20   ALKPHOS 140*   BILITOT 0.4       Recent Labs     11/25/22  1438   LABALBU 3.1*       Ferritin   Date Value Ref Range Status   05/05/2021 767 ng/mL Final     Comment:     FERRITIN Reference Ranges:  Adult Males   20 - 60 years:    27 - 400 ng/mL  Adult females 16 - 61 years:    15 - 150 ng/mL  Adults greater than 60 years:   no established reference range  Pediatrics:                     no established reference range       Iron   Date Value Ref Range Status   05/05/2021 74 37 - 145 mcg/dL Final     TIBC   Date Value Ref Range Status   05/05/2021 116 (L) 250 - 450 mcg/dL Final       Vitamin B-12   Date Value Ref Range Status   05/05/2021 895 211 - 946 pg/mL Final       Folate   Date Value Ref Range Status   05/05/2021 >20.0 4.8 - 24.2 ng/mL Final         Lab Results   Component Value Date/Time    COLORU Yellow 11/25/2022 02:38 PM    NITRU Negative 11/25/2022 02:38 PM    GLUCOSEU Negative 11/25/2022 02:38 PM    GLUCOSEU NEGATIVE 08/17/2011 10:30 PM    KETUA Negative 11/25/2022 02:38 PM    UROBILINOGEN 0.2 11/25/2022 02:38 PM    BILIRUBINUR Negative 11/25/2022 02:38 PM    BILIRUBINUR NEGATIVE 08/17/2011 10:30 PM       No results found for: MAGDA, CREURRAN, MACREATRATIO, OSMOU    No components found for: URIC    No results found for: LIPIDPAN      Assessment and Plan:    Esrd  Hd sat for k of 5.4  Then resume usual schedule mwf  Check phos pth    2.  Ms changes  Improved  Urine cx sent   On rocephin  Ua: turbid, sm ld, lg LE, packed wbc, many bact, protein 100  Head ct no abnl    3. Orthostatic hypotension  Continue midodrine    4. Anemia  In setting of esrd  Continue lorie    Ok for [employer].  Carl Wright MD

## 2022-11-27 NOTE — DISCHARGE INSTR - DIET

## 2022-11-27 NOTE — PROGRESS NOTES
Spoke with Lucio Marcus RN re: She stated that [facility] would accept back prior to [facility].

## 2022-11-27 NOTE — DISCHARGE INSTR - COC
VASCULAR SURGERY N/A 5/6/2021    INSERTION TUNNELED DIALYSIS CATHETER performed by Janifer Hashimoto, MD at [facility] 11/4/2022    LEFT DISTAL RADIUS OPEN REDUCTION INTERNAL FIXATION    ++SYNTHES++ performed by Manas Reeves MD at [address] [facility]       Immunization History:   Immunization History   Administered Date(s) Administered    COVID-19, PFIZER PURPLE top, DILUTE for use, (age 15 y+), 30mcg/0.3mL 01/05/2021, 01/26/2021       Active Problems:  Patient Active Problem List   Diagnosis Code    Neurologic gait dysfunction R26.9    Diabetes mellitus ([address] E11.9    Hypertension I10    Arthritis M19.90    Knee problem M25.9    Anemia due to stage 3 chronic kidney disease N18.30, D63.1    Primary osteoarthritis of both knees M17.0    Moderate protein-calorie malnutrition (HCC) E44.0    Pressure injury of sacral region, stage 4 (HCC) L89.154    Pressure injury of right hip, stage 3 ([address] L89.213    Left bundle branch block I44.7    Moderate obesity E66.8    Failure to thrive in adult R62.7    Pressure injury of sacral region, stage 3 (HCC) L89.153    Decreased dorsalis pedis pulse R09.89    ESRD on hemodialysis ([address] N18.6, Z99.2    Closed bicondylar fracture of distal femur, right, initial encounter ([address] S72.421A, S72.431A    Other fracture of right femur, initial encounter for closed fracture ([address] S72.8X1A    Closed fracture of distal ends of left radius and ulna S52.502A, S52.602A    Closed bicondylar fracture of distal end of right femur ([address] S72.421A, S72.431A       Isolation/Infection:   Isolation            No Isolation          Patient Infection Status       Infection Onset Added Last Indicated Last Indicated By Review Planned Expiration Resolved Resolved By    None active    Resolved    COVID-19 (Rule Out) 07/08/22 07/08/22 07/08/22 COVID-19, Rapid (Ordered)   07/08/22 Rule-Out Test Resulted    COVID-19 (Rule Out) 04/11/22 04/11/22 04/12/22 COVID-19, Rapid (Ordered)   04/12/22 Rule-Out Test Resulted    COVID-19 (Rule Out) 21 COVID-19 (Ordered)   21 Rule-Out Test Resulted    COVID-19 (Rule Out) 10/09/20 10/09/20 10/09/20 COVID-19 (Ordered)   10/09/20 Rule-Out Test Resulted    COVID-19 (Rule Out) 20 COVID-19 (Ordered)   20     VRE 20 Culture, Wound   10/12/20 Madelaine Caputo RN    Enterococcus faecium Wound-Coccyx 20    MDRO (multi-drug resistant organism) 20 Culture, Wound   10/12/20 Madelaine Caputo RN    COVID-19 (Rule Out) 20 COVID-19 (Ordered)   20 Rule-Out Test Resulted    DETECTED 2020    COVID-19 (Rule Out) 05/14/20 05/15/20 05/14/20 Covid-19 Ambulatory (Ordered)   20 Rule-Out Test Resulted    Detected 2020    COVID-19 (Rule Out) 20 COVID-19 (Ordered)   20 Rule-Out Test Resulted    DETECTED 2020    C-diff Rule Out 20 CLOSTRIDIUM DIFFICILE EIA (Ordered)   20 Clayton Whyte RN    Does not meet criteria    MRSA 20 Culture, Wound   20 Janes Cheatham RN    Wound buttock 5/3/20    COVID-19 20 COVID-19   20     Detected 2020, 2020, 2020, 5/3/2020    COVID-19 (Rule Out) 20 COVID-19 (Ordered)   20 Rule-Out Test Resulted    DETECTED 5/3/2020            Nurse Assessment:  Last Vital Signs: BP (!) 128/42   Pulse 66   Temp 98.2 °F (36.8 °C)   Resp 16   Ht 5' 1\" (1.549 m)   Wt 201 lb 1 oz (91.2 kg)   SpO2 97%   BMI 37.99 kg/m²     Last documented pain score (0-10 scale): Pain Level: 0  Last Weight:   Wt Readings from Last 1 Encounters:   22 201 lb 1 oz (91.2 kg)     Mental Status:  oriented, alert, coherent, logical, thought processes intact, and able to concentrate and follow conversation    IV Access:  - None    Nursing Mobility/ADLs:  Walking Dependent  Transfer  Dependent  Bathing  Assisted  Dressing  Assisted  Toileting  Assisted  Feeding  Independent  Med Admin  Independent  Med Delivery   whole    Wound Care Documentation and Therapy:  Wound 12/06/21 Sacrum in fold (Active)   Number of days: 355       Wound 07/09/22 Coccyx (Active)   Number of days: 141       Wound 10/25/22 Coccyx #1 (Active)   Wound Image   11/08/22 1410   Wound Etiology Pressure Stage 4 11/08/22 1410   Dressing Status Clean;Dry; Intact 11/09/22 2006   Wound Cleansed Not Cleansed 11/09/22 2006   Dressing/Treatment Alginate;Gauze dressing/dressing sponge 11/07/22 0006   Dressing Change Due 11/10/22 11/09/22 2006   Wound Length (cm) 8 cm 11/07/22 1647   Wound Width (cm) 3 cm 11/07/22 1647   Wound Depth (cm) 1.5 cm 11/07/22 1647   Wound Surface Area (cm^2) 24 cm^2 11/07/22 1647   Change in Wound Size % (l*w) -58.1 11/07/22 1647   Wound Volume (cm^3) 36 cm^3 11/07/22 1647   Wound Healing % -69 11/07/22 1647   Post-Procedure Length (cm) 7.1 cm 11/01/22 1542   Post-Procedure Width (cm) 2.7 cm 11/01/22 1542   Post-Procedure Depth (cm) 1.9 cm 11/01/22 1542   Post-Procedure Surface Area (cm^2) 19.17 cm^2 11/01/22 1542   Post-Procedure Volume (cm^3) 36.423 cm^3 11/01/22 1542   Undermining Starts ___ O'Clock 3 11/01/22 1503   Undermining Ends___ O'Clock 6 11/01/22 1503   Undermining Maxium Distance (cm) 1.2 @5 11/01/22 1503   Wound Assessment Other (Comment) 11/09/22 2006   Drainage Amount Moderate 11/08/22 1410   Drainage Description Sanguinous 11/08/22 1410   Odor Mild 11/08/22 1410   Carmen-wound Assessment Intact 11/09/22 2006   Margins Defined edges 11/08/22 1410   Number of days: 32       Wound 11/25/22 Coccyx Posterior (Active)   Dressing Status New dressing applied 11/27/22 0119   Wound Cleansed Cleansed with saline 11/27/22 0119   Dressing/Treatment Foam;Moist to dry 11/27/22 0119   Dressing Change Due 11/27/22 11/27/22 0119   Wound Assessment Pink/red;Purple/maroon 11/26/22 0009 Drainage Amount Moderate 11/27/22 0119   Drainage Description Serosanguinous 11/27/22 0119   Odor None 11/27/22 0119   Carmen-wound Assessment Blanchable erythema 11/27/22 0119   Margins Attached edges 11/27/22 0119   Wound Thickness Description not for Pressure Injury Full thickness 11/27/22 0119   Number of days: 1       Incision 11/04/22 Femoral Anterior;Distal;Right (Active)   Dressing Status Clean;Dry; Intact 11/27/22 0119   Incision Cleansed Not Cleansed 11/27/22 0119   Dressing/Treatment Ace wrap; Other (comment) 11/27/22 0119   Closure Sutures; Staples 11/09/22 0847   Incision Assessment Other (Comment) 11/27/22 0119   Drainage Amount None 11/27/22 0119   Number of days: 22       Incision 11/04/22 Radial Left (Active)   Dressing Status Clean;Dry; Intact 11/27/22 0119   Incision Cleansed Not Cleansed 11/09/22 2006   Dressing/Treatment Ace wrap;Splint 11/27/22 0119   Closure Sutures 11/26/22 0745   Incision Assessment Dry 11/27/22 0119   Drainage Amount None 11/27/22 0119   Carmen-incision Assessment Intact 11/27/22 0119   Number of days: 22       Incision 11/18/21 Brachial Anterior; Distal; Left (Active)   Number of days: 373        Elimination:  Continence: Bowel: Yes  Bladder: Yes  Urinary Catheter: None   Colostomy/Ileostomy/Ileal Conduit: No       Date of Last BM: 11/30/22    Safety Concerns:     History of Falls (last 30 days) and At Risk for Falls    Impairments/Disabilities:      None    Nutrition Therapy:  Current Nutrition Therapy:   - Oral Diet:  General    Routes of Feeding: Oral  Liquids: No Restrictions  Daily Fluid Restriction: no  Last Modified Barium Swallow with Video (Video Swallowing Test): not done    Treatments at the Time of Hospital Discharge:   Respiratory Treatments: N/A  Oxygen Therapy:  is not on home oxygen therapy.   Ventilator:    - No ventilator support    Rehab Therapies: Physical Therapy  Weight Bearing Status/Restrictions: Non-weight bearing on right leg  Other Medical Equipment (for information only, NOT a DME order):  hospital bed  Other Treatments: N/A    Patient's personal belongings (please select all that are sent with patient):  None    RN SIGNATURE:  Electronically signed by Misael Russ RN on 11/27/22 at 2:27 PM EST    CASE MANAGEMENT/SOCIAL WORK SECTION    Inpatient Status Date: ***    Readmission Risk Assessment Score:  Readmission Risk              Risk of Unplanned Readmission:  32           Discharging to Facility/ Agency   Name:  [address] [place] [address] [facility]  Address [address], [facility]hs    [facility], [facility] [address]  IKVMR:[phone]  Fax:[phone]    Dialysis Facility (if applicable)   Name:[facility] [address]   Address:  Dialysis Schedule:[facility] AT 6:45AM  Phone:[phone]  Fax:    / signature: Electronically signed by SHANTA Upton on 11/28/22 at 2:37 PM EST    PHYSICIAN SECTION    Prognosis: Fair    Condition at Discharge: Stable    Rehab Potential (if transferring to Rehab): Fair    Recommended Labs or Other Treatments After Discharge:     Physician Certification: I certify the above information and transfer of Saintclair Hopes  is necessary for the continuing treatment of the diagnosis listed and that she requires [facility] for greater 30 days.      Update Admission H&P: No change in H&P    PHYSICIAN SIGNATURE:  Electronically signed by Soraida Hernandez MD on 11/27/22 at 8:23 AM EST

## 2022-11-27 NOTE — PROGRESS NOTES
Subjective: The patient is awake and alert. No problems overnight. Denies chest pain, angina, and dyspnea. Denies abdominal pain. Tolerating diet. No nausea or vomiting. Objective:    BP (!) 128/42   Pulse 66   Temp 98.2 °F (36.8 °C)   Resp 16   Ht 5' 1\" (1.549 m)   Wt 201 lb 1 oz (91.2 kg)   SpO2 97%   BMI 37.99 kg/m²     Heart:  RRR, no murmurs, gallops, or rubs.   Lungs:  CTA bilaterally, no wheeze, rales or rhonchi  Abd: bowel sounds present, nontender, nondistended, no masses  Extrem:  No clubbing, cyanosis, or edema    CBC with Differential:    Lab Results   Component Value Date/Time    WBC 6.0 11/26/2022 07:02 AM    RBC 2.51 11/26/2022 07:02 AM    HGB 8.8 11/26/2022 07:02 AM    HCT 29.2 11/26/2022 07:02 AM     11/26/2022 07:02 AM    .3 11/26/2022 07:02 AM    MCH 35.1 11/26/2022 07:02 AM    MCHC 30.1 11/26/2022 07:02 AM    RDW 18.0 11/26/2022 07:02 AM    NRBC 0.0 11/26/2022 07:02 AM    SEGSPCT 71 08/16/2013 05:00 AM    BANDSPCT 1 03/29/2016 07:37 PM    METASPCT 0.9 05/10/2020 03:45 AM    LYMPHOPCT 11.3 11/26/2022 07:02 AM    PROMYELOPCT 0.9 11/07/2022 05:59 AM    MONOPCT 4.4 11/26/2022 07:02 AM    MYELOPCT 0.9 11/26/2022 07:02 AM    BASOPCT 1.7 11/26/2022 07:02 AM    MONOSABS 0.24 11/26/2022 07:02 AM    LYMPHSABS 0.66 11/26/2022 07:02 AM    EOSABS 0.10 11/26/2022 07:02 AM    BASOSABS 0.10 11/26/2022 07:02 AM     CMP:    Lab Results   Component Value Date/Time     11/26/2022 07:02 AM    K 5.4 11/26/2022 07:02 AM    CL 98 11/26/2022 07:02 AM    CO2 27 11/26/2022 07:02 AM    BUN 35 11/26/2022 07:02 AM    CREATININE 5.2 11/26/2022 07:02 AM    GFRAA 7 10/12/2022 03:49 AM    LABGLOM 8 11/26/2022 07:02 AM    GLUCOSE 98 11/26/2022 07:02 AM    GLUCOSE 149 10/12/2011 09:59 AM    PROT 6.5 11/25/2022 02:38 PM    LABALBU 3.1 11/25/2022 02:38 PM    LABALBU 4.1 10/12/2011 09:59 AM    CALCIUM 9.7 11/26/2022 07:02 AM    BILITOT 0.4 11/25/2022 02:38 PM    ALKPHOS 140 11/25/2022 02:38 PM AST 20 11/25/2022 02:38 PM    ALT 9 11/25/2022 02:38 PM        Assessment:    Patient Active Problem List   Diagnosis    Neurologic gait dysfunction    Diabetes mellitus ([address]    Hypertension    Arthritis    Knee problem    Anemia due to stage 3 chronic kidney disease    Primary osteoarthritis of both knees    Moderate protein-calorie malnutrition (HCC)    Pressure injury of sacral region, stage 4 (HCC)    Pressure injury of right hip, stage 3 (HCC)    Left bundle branch block    Moderate obesity    Failure to thrive in adult    Pressure injury of sacral region, stage 3 (HCC)    Decreased dorsalis pedis pulse    ESRD on hemodialysis ([address]    Closed bicondylar fracture of distal femur, right, initial encounter ([address]    Other fracture of right femur, initial encounter for closed fracture ([address]    Closed fracture of distal ends of left radius and ulna    Closed bicondylar fracture of distal end of right femur [facility])       Plan:  Discharge to SNF        Irish Davalos MD  8:21 AM  11/27/2022

## 2022-11-27 NOTE — PROGRESS NOTES
[address] [place] [address] [facility]  DANKBREANNE  Progress Note    SUBJECTIVE:  Chief Complaint   Patient presents with    Altered Mental Status     Pt feeling \" normal \" per family slurred speech noted prior to going to dialysis, sent from ED after dialysis per family request      Patient is tolerating medications. No reported adverse drug reactions. No nausea, vomiting, diarrhea. In bed, feeling okay  No new complaints, denies any urinary issues  Afebrile overnight     Review of systems:  As stated above in the chief complaint, otherwise negative. Medications:  Scheduled Meds:   busPIRone  10 mg Oral BID    escitalopram  10 mg Oral Daily    gabapentin  300 mg Oral TID    heparin (porcine)  5,000 Units SubCUTAneous 3 times per day    insulin glargine  6 Units SubCUTAneous Nightly    metoprolol succinate  25 mg Oral Daily    midodrine  15 mg Oral Once per day on     polyethylene glycol  17 g Oral Daily    senna  2 tablet Oral BID    vitamin D  2,000 Units Oral Daily    sodium chloride flush  5-40 mL IntraVENous 2 times per day    insulin lispro  0-8 Units SubCUTAneous TID WC    insulin lispro  0-4 Units SubCUTAneous Nightly     Continuous Infusions:   sodium chloride      dextrose       PRN Meds:haloperidol lactate, HYDROcodone 5 mg - acetaminophen, acetaminophen, bisacodyl, magnesium hydroxide, prochlorperazine, traZODone, sodium chloride flush, sodium chloride, glucose, dextrose bolus **OR** dextrose bolus, glucagon (rDNA), dextrose    OBJECTIVE:  /61   Pulse 76   Temp 97.8 °F (36.6 °C) (Oral)   Resp 16   Ht 5' 1\" (1.549 m)   Wt 201 lb 1 oz (91.2 kg)   SpO2 96%   BMI 37.99 kg/m²   Temp  Av.9 °F (36.6 °C)  Min: 97.2 °F (36.2 °C)  Max: 98.5 °F (36.9 °C)  Constitutional: The patient is awake, alert, and oriented. In bed. Skin: Warm and dry. No rashes were noted. Has a decubitus wound which looks clean no sign of infection. HEENT: Round and reactive pupils.   Moist mucous membranes. No ulcerations or thrush. Neck: Supple to movements. Chest: No use of accessory muscles to breathe. Symmetrical expansion. No wheezing, crackles or rhonchi. Cardiovascular: S1 and S2 are rhythmic and regular. No murmurs appreciated. Abdomen: Positive bowel sounds to auscultation. Benign to palpation. No masses felt. No hepatosplenomegaly. Extremities: No clubbing, no cyanosis, no edema.  Left hand in a dressing and right lower extremity is in a cast.  Lines: peripheral   left upper extremity fistula    Laboratory and Tests Review:  Lab Results   Component Value Date    WBC 6.0 11/26/2022    WBC 7.1 11/25/2022    WBC 6.2 11/23/2022    HGB 8.8 (L) 11/26/2022    HCT 29.2 (L) 11/26/2022    .3 (H) 11/26/2022     11/26/2022     Lab Results   Component Value Date    NEUTROABS 4.86 11/26/2022    NEUTROABS 5.39 11/25/2022    NEUTROABS 5.30 11/09/2022     No results found for: [facility]  Lab Results   Component Value Date    ALT 9 11/25/2022    AST 20 11/25/2022    ALKPHOS 140 (H) 11/25/2022    BILITOT 0.4 11/25/2022     Lab Results   Component Value Date/Time     11/26/2022 07:02 AM    K 5.4 11/26/2022 07:02 AM    CL 98 11/26/2022 07:02 AM    CO2 27 11/26/2022 07:02 AM    BUN 35 11/26/2022 07:02 AM    CREATININE 5.2 11/26/2022 07:02 AM    CREATININE 4.2 11/25/2022 02:38 PM    CREATININE 8.2 11/23/2022 04:26 AM    GFRAA 7 10/12/2022 03:49 AM    LABGLOM 8 11/26/2022 07:02 AM    GLUCOSE 98 11/26/2022 07:02 AM    GLUCOSE 149 10/12/2011 09:59 AM    PROT 6.5 11/25/2022 02:38 PM    LABALBU 3.1 11/25/2022 02:38 PM    LABALBU 4.1 10/12/2011 09:59 AM    CALCIUM 9.7 11/26/2022 07:02 AM    BILITOT 0.4 11/25/2022 02:38 PM    ALKPHOS 140 11/25/2022 02:38 PM    AST 20 11/25/2022 02:38 PM    ALT 9 11/25/2022 02:38 PM     Lab Results   Component Value Date    CRP 21.1 (H) 04/12/2022    CRP 19.3 (H) 05/24/2020    CRP 15.8 (H) 05/21/2020     Lab Results   Component Value Date    SEDRATE 123 (H) 04/12/2022 SEDRATE 45 (H) 08/13/2019    SEDRATE 35 (H) 08/16/2013     Radiology:      Microbiology:   Urine Culture: not sent, doesn't produce much urine due to HD     ASSESSMENT:  Acute confusion like related to Percocet use. Asymptomatic bacteriuria  ESRD on hemodialysis. PLAN:  No need for further antibiotics. Monitor labs    PABLO Hutson - CNP  9:43 AM  11/27/2022     Pt seen and examined. Above discussed agree with advanced practice nurse. Labs, cultures, and radiographs reviewed. Face to Face encounter occurred. Changes made as necessary. Ready to be discharged today.     Quincy Rico MD

## 2022-11-27 NOTE — PLAN OF CARE
Problem: Discharge Planning  Goal: Discharge to home or other facility with appropriate resources  11/27/2022 1412 by Benja Bobo RN  Outcome: Completed  11/27/2022 1208 by Ana M Rojas RN  Outcome: Progressing     Problem: Pain  Goal: Verbalizes/displays adequate comfort level or baseline comfort level  11/27/2022 1412 by Benja Bobo RN  Outcome: Completed  11/27/2022 1208 by Ana M Rojas RN  Outcome: Progressing     Problem: Skin/Tissue Integrity  Goal: Absence of new skin breakdown  Description: 1. Monitor for areas of redness and/or skin breakdown  2. Assess vascular access sites hourly  3. Every 4-6 hours minimum:  Change oxygen saturation probe site  4. Every 4-6 hours:  If on nasal continuous positive airway pressure, respiratory therapy assess nares and determine need for appliance change or resting period.   11/27/2022 1412 by Benja Bobo RN  Outcome: Completed  11/27/2022 1208 by Ana M Rojas RN  Outcome: Progressing     Problem: Safety - Adult  Goal: Free from fall injury  11/27/2022 1412 by Benja Bobo RN  Outcome: Completed  11/27/2022 1208 by Ana M Rojas RN  Outcome: Progressing     Problem: ABCDS Injury Assessment  Goal: Absence of physical injury  11/27/2022 1412 by Benja Bobo RN  Outcome: Completed  11/27/2022 1208 by Ana M Rojas RN  Outcome: Progressing     Problem: Nutrition Deficit:  Goal: Optimize nutritional status  11/27/2022 1412 by Benja Bobo RN  Outcome: Completed  11/27/2022 1208 by Ana M Rojas RN  Outcome: Progressing     Problem: Chronic Conditions and Co-morbidities  Goal: Patient's chronic conditions and co-morbidity symptoms are monitored and maintained or improved  11/27/2022 1412 by Benja Bobo RN  Outcome: Completed  11/27/2022 1208 by Ana M Rojas RN  Outcome: Progressing

## 2022-11-27 NOTE — PROGRESS NOTES
Physical Therapy  Facility/Department: [facility]  Physical Therapy Initial Assessment    Name: Rebecca Lopez  : 1950  MRN: [id number]  Date of Service: 2022                 Patient Diagnosis(es): The encounter diagnosis was UTI (urinary tract infection), uncomplicated. Past Medical History:  has a past medical history of Anemia in chronic kidney disease, Anxiety and depression, Arthritis, Chronic pain syndrome, COVID-19, COVID-19, Decreased dorsalis pedis pulse, Diabetes mellitus ([address] Dysphagia, oral phase, ESRD on hemodialysis ([address] GERD (gastroesophageal reflux disease), Hemodialysis patient ([address] Hyperkalemia, Hypertension, Hypertensive kidney disease with stage 4 chronic kidney disease ([address] Insomnia, Kidney stones, MDD (major depressive disorder), Moderate protein-calorie malnutrition ([address] Morbid obesity ([address] Muscle weakness (generalized), Obesity, Pressure injury of sacral region, stage 3 ([address] Pressure ulcer of sacral region, Sacral pressure ulcer, Unspecified osteoarthritis, unspecified site, and Weakness generalized. Past Surgical History:  has a past surgical history that includes Foot surgery (2009 ); Cystocopy (2011 );  section (x3); Upper gastrointestinal endoscopy (2.18.15); Cholecystectomy (3/31/16); Abdomen surgery (N/A, 2020); Upper gastrointestinal endoscopy (N/A, 2020); Upper gastrointestinal endoscopy (N/A, 2020); vascular surgery (N/A, 2021); Dialysis fistula creation (Left, 2021); Dialysis fistula creation (Right, 2021); Femur fracture surgery (Right, 2022); and Wrist fracture surgery (Left, 2022). Requires PT Follow-Up: Yes      Evaluating Therapist: Jurgen Florez PT      Referring Provider:  Chip Lau MD     PT order : PT eval and treat      Room #: 144  DIAGNOSIS:  The encounter diagnosis was UTI (urinary tract infection), uncomplicated.   Recent Closed bicondylar fracture of right femur, initial encounter [facility]). Diagnoses of Closed fracture of distal ends of left radius and ulna;   ORIF L distal radius and ORIF R femur 11/4/2022  Additional Pertinent History:recently home after in SNF x 3  years. PRECAUTIONS:  falls, forearm WB L UE, NWB R LE, R knee brace      Social:  Pt lives with  Son  in a  2  floor plan  With stair glide to second floor, ramp   to enter. Prior to admission pt walked with  ww short distances or uses a w/c     Admitted from SNF - pt reports Trevon Isabel used for transfers        Initial Evaluation  Date: 11/27/2022  Treatment      Short Term/ Long Term   Goals   Was pt agreeable to Eval/treatment? yes       Does pt have pain?  coccyx wound         Bed Mobility  Rolling:  min assist  Supine to sit: min assist   Sit to supine: mod assist   Scooting:  Max assist in supine    SBA/CGA    Transfers Sit to stand:  NT    Max assist x 2    Ambulation     NT     TBA           Stair negotiation: ascended and descended NT    TBA    LE ROM  Decreased L hip, distally WFL, decreased throughout RLE - knee flex NT due to brace        LE strength  3-/ 5 L hip, 3+ to 4-/ 5 distally,   R hip 2/ 5, 3-/ 5 ankle , able to perform QS    Increase by 1 MMT grade    AM- PAC RAW score  10/ 24                 Pt is alert and Oriented x  3       Balance:  SBA sitting EOB. High fall risk due to recent fall, weakness, and WB restrictions   Endurance: decreased   Bed/Chair alarm: Yes      ASSESSMENT     Pt displays functional ability as noted in the objective portion of this evaluation.           Conditions Requiring Skilled Therapeutic Intervention:     [x]Decreased strength                                      []Decreased ROM  [x]Decreased functional mobility  [x]Decreased balance   [x]Decreased endurance   [x]Decreased posture  []Decreased sensation  []Decreased coordination   []Decreased vision  []Decreased safety awareness   [x]Increased pain                Treatment/Education:    Pt in bed upon arrival ; agreeable to PT Mobility as above. instructed in R LE NWB, L UE forearm weight bearing; pt reports she is aware and did follow throughout session. Pt performed supine QS on R LE and seated B APs and L LE hip flex and LAQs while seated EOB. Pt reports mild dizziness while sitting. Able to maintain posture once positioned at EOB. Pt educated on fall risk,  Safety with mobility , LE exercises        Patient response to education:   Pt verbalized understanding Pt demonstrated skill Pt requires further education in this area   x    x         Comments:  Pt left  In bed after session, with call light in reach. Rehab potential is Good for reaching above PT goals. Pts/ family goals      1. Increased strength, rehab      Patient and or family understand(s) diagnosis, prognosis, and plan of care. -  Yes      PLAN     PT care will be provided in accordance with the objectives noted above. Whenever appropriate, clear delegation orders will be provided for nursing staff. Exercises and functional mobility practice will be used as well as appropriate assistive devices or modalities to obtain goals. Patient and family education will also be administered as needed.            PLAN OF CARE:     Current Treatment Recommendations      [x] Strengthening to improve independence with functional mobility   [] ROM to improve independence with functional mobility   [x] Balance Training to improve static/dynamic balance and to reduce fall risk  [x] Endurance Training to improve activity tolerance during functional mobility    [x]Transfer Training to improve safety and independence with all functional transfers   [] Gait Training to improve gait mechanics, endurance and assess need for appropriate assistive device  [] Stair Training in preparation for safe discharge home and/or into the community   [x] Positioning to prevent skin breakdown and contractures  [x] Safety and Education Training   [x] Patient/Caregiver Education   [] HEP  [] Other      Frequency of treatments will be 2-5x/week x  7-14 days. Time in: 0827  Time out: 0852         Evaluation Time includes thorough review of current medical information, gathering information on past medical history/social history and prior level of function, completion of standardized testing/informal observation of tasks, assessment of data and education on plan of care and goals.      CPT codes:  [x] Low Complexity PT evaluation 92357  [] Moderate Complexity PT evaluation 19474  [] High Complexity PT evaluation 76974  [] PT Re-evaluation 28859  [] Gait training 21402  minutes  [x] Therapeutic activities 67673  9 minutes  [] Therapeutic exercises 14749  minutes  [] Neuromuscular reeducation 17951  minutes         Kimber[address] 18 number:  PT 8962

## 2022-11-27 NOTE — CARE COORDINATION
Spoke to [facility] to see if they can accept back prior to [facility]. Unfortunately, they will need auth before accepting back. She will submit precert first thing Monday am.  I have messaged therapy to request pt/ot evals asap so that we can expedite SNF auth. DC order noted.

## 2022-11-27 NOTE — PROGRESS NOTES
Physician Progress Note      Harpreet Urbano  [facility] #:                  [id number]  :                       1950  ADMIT DATE:       2022 12:54 PM  [address] [place] DATE:  RESPONDING  PROVIDER #:        Fritzi Cushing MD          QUERY TEXT:    Dear Attending Physician,    Pt admitted with UTI . Pt noted to have acute confusion . If possible, please   document in the progress notes and discharge summary if you are evaluating and   / or treating any of the following: The medical record reflects the following:  Risk Factors: UTI, chronic sacral decubitus, ESRD  Clinical Indicators: Per ED,\". Cee Quiver Cee Quiver Patient does appear to be confused and this   was confirmed by family. She was not this way 24 hours ago per daughter. .. Consider metabolic causes for her altered mental status( METABOLIC   ENCEPHALOPATHY?? ). ..UA is concerning for infectious process. ... Cee Quiver \"  Per PCP   ,\". Cee Quiver Cee Quiver The patient this morning is completely alert and oriented, knows who   I am, knows where she is. Cee Quiver Cee Quiver \"  Treatment: IV ATB    Thank you,  Marly Conner RN CCDS  Clinical Documentation Improvement Specialist  Options provided:  -- Metabolic Encephalopathy  -- Encephalopathy due to, Please specify cause and type. -- Acute confusion due to, Please specify cause. -- Other - I will add my own diagnosis  -- Disagree - Not applicable / Not valid  -- Disagree - Clinically unable to determine / Unknown  -- Refer to Clinical Documentation Reviewer    PROVIDER RESPONSE TEXT:    This patient has Metabolic Encephalopathy.     Query created by: Joya Burris on 2022 1:27 PM      Electronically signed by:  Fritzi Cushing MD 2022 7:59 AM

## 2022-11-28 LAB
METER GLUCOSE: 101 MG/DL (ref 74–99)
METER GLUCOSE: 150 MG/DL (ref 74–99)
METER GLUCOSE: 167 MG/DL (ref 74–99)
METER GLUCOSE: 83 MG/DL (ref 74–99)

## 2022-11-28 PROCEDURE — 90935 HEMODIALYSIS ONE EVALUATION: CPT

## 2022-11-28 PROCEDURE — 97166 OT EVAL MOD COMPLEX 45 MIN: CPT

## 2022-11-28 PROCEDURE — 6370000000 HC RX 637 (ALT 250 FOR IP): Performed by: FAMILY MEDICINE

## 2022-11-28 PROCEDURE — 97535 SELF CARE MNGMENT TRAINING: CPT

## 2022-11-28 PROCEDURE — 2580000003 HC RX 258: Performed by: FAMILY MEDICINE

## 2022-11-28 PROCEDURE — 2060000000 HC ICU INTERMEDIATE R&B

## 2022-11-28 PROCEDURE — 82962 GLUCOSE BLOOD TEST: CPT

## 2022-11-28 PROCEDURE — 0202U NFCT DS 22 TRGT SARS-COV-2: CPT

## 2022-11-28 PROCEDURE — 6360000002 HC RX W HCPCS: Performed by: FAMILY MEDICINE

## 2022-11-28 RX ADMIN — HYDROCODONE BITARTRATE AND ACETAMINOPHEN 1 TABLET: 5; 325 TABLET ORAL at 02:50

## 2022-11-28 RX ADMIN — Medication 10 ML: at 11:25

## 2022-11-28 RX ADMIN — Medication 2000 UNITS: at 11:25

## 2022-11-28 RX ADMIN — HEPARIN SODIUM 5000 UNITS: 10000 INJECTION INTRAVENOUS; SUBCUTANEOUS at 18:20

## 2022-11-28 RX ADMIN — ESCITALOPRAM OXALATE 10 MG: 10 TABLET ORAL at 11:25

## 2022-11-28 RX ADMIN — METOPROLOL SUCCINATE 25 MG: 25 TABLET, EXTENDED RELEASE ORAL at 11:25

## 2022-11-28 RX ADMIN — HEPARIN SODIUM 5000 UNITS: 10000 INJECTION INTRAVENOUS; SUBCUTANEOUS at 20:59

## 2022-11-28 RX ADMIN — BUSPIRONE HYDROCHLORIDE 10 MG: 10 TABLET ORAL at 11:25

## 2022-11-28 RX ADMIN — BUSPIRONE HYDROCHLORIDE 10 MG: 10 TABLET ORAL at 20:58

## 2022-11-28 RX ADMIN — GABAPENTIN 300 MG: 300 CAPSULE ORAL at 11:25

## 2022-11-28 RX ADMIN — HEPARIN SODIUM 5000 UNITS: 10000 INJECTION INTRAVENOUS; SUBCUTANEOUS at 06:31

## 2022-11-28 RX ADMIN — GABAPENTIN 300 MG: 300 CAPSULE ORAL at 20:58

## 2022-11-28 RX ADMIN — ACETAMINOPHEN 1000 MG: 500 TABLET ORAL at 20:52

## 2022-11-28 RX ADMIN — MIDODRINE HYDROCHLORIDE 15 MG: 5 TABLET ORAL at 06:53

## 2022-11-28 RX ADMIN — HYDROCODONE BITARTRATE AND ACETAMINOPHEN 1 TABLET: 5; 325 TABLET ORAL at 23:27

## 2022-11-28 RX ADMIN — GABAPENTIN 300 MG: 300 CAPSULE ORAL at 16:45

## 2022-11-28 ASSESSMENT — PAIN DESCRIPTION - ORIENTATION: ORIENTATION: MID;LOWER;LEFT

## 2022-11-28 ASSESSMENT — PAIN SCALES - GENERAL
PAINLEVEL_OUTOF10: 3
PAINLEVEL_OUTOF10: 5

## 2022-11-28 ASSESSMENT — PAIN DESCRIPTION - LOCATION: LOCATION: BACK;WRIST

## 2022-11-28 ASSESSMENT — PAIN DESCRIPTION - DESCRIPTORS: DESCRIPTORS: ACHING;DISCOMFORT;THROBBING

## 2022-11-28 ASSESSMENT — PAIN - FUNCTIONAL ASSESSMENT: PAIN_FUNCTIONAL_ASSESSMENT: PREVENTS OR INTERFERES SOME ACTIVE ACTIVITIES AND ADLS

## 2022-11-28 NOTE — PROGRESS NOTES
[address] [place] [address] [facility]  AMANUEL  Progress Note    SUBJECTIVE:  Chief Complaint   Patient presents with    Altered Mental Status     Pt feeling \" normal \" per family slurred speech noted prior to going to dialysis, sent from ED after dialysis per family request      The patient is doing well. No new complaints. Denies any rhinorrhea, sore throat, headache, fever, cough or dyspnea. She has tested positive for SARS-CoV-2 but is completely asymptomatic. She makes minimal urine and has no dysuria    Review of systems:  As stated above in the chief complaint, otherwise negative. Medications:  Scheduled Meds:   epoetin derek-epbx  10,000 Units SubCUTAneous Once per day on     busPIRone  10 mg Oral BID    escitalopram  10 mg Oral Daily    gabapentin  300 mg Oral TID    heparin (porcine)  5,000 Units SubCUTAneous 3 times per day    insulin glargine  6 Units SubCUTAneous Nightly    metoprolol succinate  25 mg Oral Daily    midodrine  15 mg Oral Once per day on     polyethylene glycol  17 g Oral Daily    senna  2 tablet Oral BID    vitamin D  2,000 Units Oral Daily    sodium chloride flush  5-40 mL IntraVENous 2 times per day    insulin lispro  0-8 Units SubCUTAneous TID WC    insulin lispro  0-4 Units SubCUTAneous Nightly     Continuous Infusions:   sodium chloride      dextrose       PRN Meds:haloperidol lactate, HYDROcodone 5 mg - acetaminophen, acetaminophen, bisacodyl, magnesium hydroxide, prochlorperazine, traZODone, sodium chloride flush, sodium chloride, glucose, dextrose bolus **OR** dextrose bolus, glucagon (rDNA), dextrose    OBJECTIVE:  BP (!) 167/54   Pulse 67   Temp 97.6 °F (36.4 °C)   Resp 18   Ht 5' 1\" (1.549 m)   Wt 197 lb 5 oz (89.5 kg)   SpO2 96%   BMI 37.28 kg/m²   Temp  Av °F (36.7 °C)  Min: 97.6 °F (36.4 °C)  Max: 98.4 °F (36.9 °C)  Constitutional: The patient is awake, alert, and oriented. In bed. Skin: Warm and dry. No rashes were noted. Has a decubitus wound which looks clean no sign of infection. HEENT: Round and reactive pupils. Moist mucous membranes. No ulcerations or thrush. Neck: Supple to movements. Chest: No respiratory distress. No crackles. Cardiovascular: Heart sounds rhythmic and regular. Abdomen: Positive bowel sounds to auscultation. Benign to palpation. Extremities: No edema.  Left hand in a dressing and right lower extremity is in a cast.  Lines: peripheral   left upper extremity fistula    Laboratory and Tests Review:  Lab Results   Component Value Date    WBC 6.0 11/26/2022    WBC 7.1 11/25/2022    WBC 6.2 11/23/2022    HGB 8.8 (L) 11/26/2022    HCT 29.2 (L) 11/26/2022    .3 (H) 11/26/2022     11/26/2022     Lab Results   Component Value Date    NEUTROABS 4.86 11/26/2022    NEUTROABS 5.39 11/25/2022    NEUTROABS 5.30 11/09/2022     No results found for: [facility]  Lab Results   Component Value Date    ALT 9 11/25/2022    AST 20 11/25/2022    ALKPHOS 140 (H) 11/25/2022    BILITOT 0.4 11/25/2022     Lab Results   Component Value Date/Time     11/26/2022 07:02 AM    K 5.4 11/26/2022 07:02 AM    CL 98 11/26/2022 07:02 AM    CO2 27 11/26/2022 07:02 AM    BUN 35 11/26/2022 07:02 AM    CREATININE 5.2 11/26/2022 07:02 AM    CREATININE 4.2 11/25/2022 02:38 PM    CREATININE 8.2 11/23/2022 04:26 AM    GFRAA 7 10/12/2022 03:49 AM    LABGLOM 8 11/26/2022 07:02 AM    GLUCOSE 98 11/26/2022 07:02 AM    GLUCOSE 149 10/12/2011 09:59 AM    PROT 6.5 11/25/2022 02:38 PM    LABALBU 3.1 11/25/2022 02:38 PM    LABALBU 4.1 10/12/2011 09:59 AM    CALCIUM 9.7 11/26/2022 07:02 AM    BILITOT 0.4 11/25/2022 02:38 PM    ALKPHOS 140 11/25/2022 02:38 PM    AST 20 11/25/2022 02:38 PM    ALT 9 11/25/2022 02:38 PM     Lab Results   Component Value Date    CRP 21.1 (H) 04/12/2022    CRP 19.3 (H) 05/24/2020    CRP 15.8 (H) 05/21/2020     Lab Results   Component Value Date    SEDRATE 123 (H) 04/12/2022    SEDRATE 45 (H) 08/13/2019    SEDRATE 35 (H) 08/16/2013     Radiology:      Microbiology:   Urine Culture: not sent, doesn't produce much urine due to HD     ASSESSMENT:  Acute confusion like related to Percocet use. Asymptomatic bacteriuria  ESRD on hemodialysis. PLAN:  No need for further antibiotics.   No further recommendations  ID will sign off    Neftali Woods MD  1:23 PM  11/28/2022

## 2022-11-28 NOTE — CARE COORDINATION
Social work / Discharge Planning:          [address]. Patient accepted by [facility]. Precert submitted. Per facility liaison, must wait for insurance approval.     CIELO and transport form completed.     Awaiting insurance approval.    Electronically signed by SHANTA Zuleta on 11/28/2022 at 2:22 PM

## 2022-11-28 NOTE — CARE COORDINATION
Per Demi, they CAN accept patient back with Covid positive status. Cancelling [facility] precert [place]. Per Demi, they hope to accept patient back to facility tomorrow. Daughter, Justinbernard Luca, updated.

## 2022-11-28 NOTE — CARE COORDINATION
Social work / Discharge Planning:         Social work left another message for patient's daughter to discuss [facility] options due to covid positive. Patient is out of the room for dialysis.     Electronically signed by SHANTA Zhao on 11/28/2022 at 11:46 AM

## 2022-11-28 NOTE — PROGRESS NOTES
OCCUPATIONAL THERAPY INITIAL EVALUATION     [address] [facility], [address]       AMZJ:                                                  Patient Name: Jimmy Hathaway  MRN: [id number]  : 1950  Room: [address] [place] [address]    Evaluating OT: Ora Semen, [address] [place], OTR/L [id number]  Referring Megha Fatima MD  Specific Provider Orders: OT eval and treat   Recommended Adaptive Equipment:  TBD     Diagnosis: UTI   Surgery: none   Pertinent Medical History: DM, HTN, covid, obesity, 11-3-2022 Right distal femur fracture open reduction and internal fixation,  LEFT DISTAL RADIUS OPEN REDUCTION INTERNAL FIXATION   Precautions:  Fall Risk, droplet +, NWB RLE (with hinge brace), forearm WB LUE, HD    Assessment of current deficits   [x] Functional mobility  [x]ADLs  [x] Strength               [x]Cognition   [x] Functional transfers   [x] IADLs         [x] Safety Awareness   [x]Endurance   [] Fine Coordination              [x] Balance      [] Vision/perception   [x]Sensation    []Gross Motor Coordination  [] ROM  [] Delirium                   [] Motor Control     OT PLAN OF CARE   OT POC based on physician orders, patient diagnosis and results of clinical assessment    Frequency/Duration: 2-3 days/wk for 2 weeks PRN   Specific OT Treatment to include:   * Instruction/training on adapted ADL techniques and AE recommendations to increase functional independence within precautions       * Training on energy conservation strategies, correct breathing pattern and techniques to improve independence/tolerance for self-care routine  * Functional transfer/mobility training/DME recommendations for increased independence, safety, and fall prevention  * Patient/Family education to increase follow through with safety techniques and functional independence  * Recommendation of environmental modifications for increased safety with functional transfers/mobility and call light and phone within reach, all lines and tubes intact. Pt would benefit from continued OT to increase functional independence and quality of life. Treatment: Pt required vc's and physical assist for proper technique/safety with hand placement/body mechanics/posture for bed mobility/ADLs. Pt required vc's for sequencing/initiation of ADLs/bed mobility. Pt required assist to re-position to [facility]. Pt demo'ing F+ rolling for assist with LB ADLs. Pt required rest breaks during session. . Pt appeared to have tolerated session well and appears motivated/cooperative/pleasant . Pt instructed on use of call light for assistance and fall prevention. Pt demo'ing fair understanding of education provided. Continue to educate. Eval Complexity: moderate  History: Expanded chart review of medical records and additional review of physical, cognitive, or psychosocial history related to current functional performance  Exam: 3+ performance deficits  Assistance/Modification: mos/max assistance or modifications required to perform tasks. May have comorbidities that affect occupational performance. Rehab Potential: Good for established goals, pt. assisted in establishment of goals. LTG: maximize independence with ADLs to return to PLOF    Patient instructed on diagnosis, prognosis/goals and plan of care. Demonstrated fair understanding. [] Malnutrition indicators have been identified and nursing has been notified to ensure a dietitian consult is ordered. Evaluation time includes thorough review of current medical information, gathering information on past medical & social history & PLOF, completion of standardized testing, informal observation of tasks, consultation with other medical professions/disciplines, assessment of data & development of POC/goals.      Time In: 1230       Time Out: 1255     Total treatment time: 15       Treatment Charges: Mins Units   OT Eval Low 56449     OT Eval Medium 40149 X    OT Eval High [id number]     OT Re-Eval [id number]     Ther Ex  [phone]       Manual Therapy 75278       Thera Activities 87489       ADL/Home Mgt 38950 15 1   Neuro Re-ed 76885       Group Therapy        Orthotic manage/training  22875       Non-Billable Time           Alex Roth, OTR/L [id number]

## 2022-11-28 NOTE — FLOWSHEET NOTE
11/28/22 1104   Vital Signs   BP (!) 167/54   Temp 97.6 °F (36.4 °C)   Heart Rate 67   Resp 18   Weight 197 lb 5 oz (89.5 kg)   Weight Method Bed scale   Percent Weight Change -2.19   Post-Hemodialysis Assessment   Post-Treatment Procedures Blood returned; Access bleeding time < 10 minutes   Machine Disinfection Process Acid/Vinegar Clean;Heat Disinfect; Exterior Machine Disinfection   Rinseback Volume (ml) 300 ml   Blood Volume Processed (Liters) 67.3 l/min   Dialyzer Clearance Lightly streaked   Duration of Treatment (minutes) 210 minutes   Heparin Amount Administered During Treatment (mL) 0 mL   Hemodialysis Intake (ml) 300 ml   Hemodialysis Output (ml) 2300 ml   NET Removed (ml) 2000   Tolerated Treatment Good   Patient Response to Treatment stasis achieved sites held <10 minutes w/out problems pt has no complaints at this time and is being transported back to room   Time Off 1044

## 2022-11-28 NOTE — CARE COORDINATION
Social work / Discharge Planning:          Patient is covid positive and cannot return to [facility]. Message left for patient's daughter to discuss options. Awaiting return call.     Electronically signed by SHANTA Hylton on 11/28/2022 at 9:34 AM

## 2022-11-28 NOTE — CARE COORDINATION
Social work / Discharge Planning:       [facility]. No return phone call from patient's daughter. Patient returned from dialysis. She is alert and oriented. Social work explained situation that because she tested positive for covid, she cannot return to [facility]. Social work provided information on facilities that are accepting covid positive. Referral made to liaison for [address] [place] [address]. Social work spoke to staff at [facility].    Patient's chair time is MWF at 6:45am.     Electronically signed by SHANTA Cerrato on 11/28/2022 at 12:14 PM

## 2022-11-28 NOTE — PROGRESS NOTES
Subjective: The patient is awake and alert. No problems overnight. Denies chest pain, angina, and dyspnea. Denies abdominal pain. Tolerating diet. No nausea or vomiting.+ COV-19 test yesterday. Asymptomatic. Objective:    BP (!) 160/62   Pulse 63   Temp 97.7 °F (36.5 °C)   Resp 18   Ht 5' 1\" (1.549 m)   Wt 201 lb 11.5 oz (91.5 kg)   SpO2 96%   BMI 38.11 kg/m²     Heart:  RRR, no murmurs, gallops, or rubs.   Lungs:  CTA bilaterally, no wheeze, rales or rhonchi  Abd: bowel sounds present, nontender, nondistended, no masses  Extrem:  No clubbing, cyanosis, or edema    CBC with Differential:    Lab Results   Component Value Date/Time    WBC 6.0 11/26/2022 07:02 AM    RBC 2.51 11/26/2022 07:02 AM    HGB 8.8 11/26/2022 07:02 AM    HCT 29.2 11/26/2022 07:02 AM     11/26/2022 07:02 AM    .3 11/26/2022 07:02 AM    MCH 35.1 11/26/2022 07:02 AM    MCHC 30.1 11/26/2022 07:02 AM    RDW 18.0 11/26/2022 07:02 AM    NRBC 0.0 11/26/2022 07:02 AM    SEGSPCT 71 08/16/2013 05:00 AM    BANDSPCT 1 03/29/2016 07:37 PM    METASPCT 0.9 05/10/2020 03:45 AM    LYMPHOPCT 11.3 11/26/2022 07:02 AM    PROMYELOPCT 0.9 11/07/2022 05:59 AM    MONOPCT 4.4 11/26/2022 07:02 AM    MYELOPCT 0.9 11/26/2022 07:02 AM    BASOPCT 1.7 11/26/2022 07:02 AM    MONOSABS 0.24 11/26/2022 07:02 AM    LYMPHSABS 0.66 11/26/2022 07:02 AM    EOSABS 0.10 11/26/2022 07:02 AM    BASOSABS 0.10 11/26/2022 07:02 AM     CMP:    Lab Results   Component Value Date/Time     11/26/2022 07:02 AM    K 5.4 11/26/2022 07:02 AM    CL 98 11/26/2022 07:02 AM    CO2 27 11/26/2022 07:02 AM    BUN 35 11/26/2022 07:02 AM    CREATININE 5.2 11/26/2022 07:02 AM    GFRAA 7 10/12/2022 03:49 AM    LABGLOM 8 11/26/2022 07:02 AM    GLUCOSE 98 11/26/2022 07:02 AM    GLUCOSE 149 10/12/2011 09:59 AM    PROT 6.5 11/25/2022 02:38 PM    LABALBU 3.1 11/25/2022 02:38 PM    LABALBU 4.1 10/12/2011 09:59 AM    CALCIUM 9.7 11/26/2022 07:02 AM    BILITOT 0.4 11/25/2022 02:38 PM ALKPHOS 140 11/25/2022 02:38 PM    AST 20 11/25/2022 02:38 PM    ALT 9 11/25/2022 02:38 PM        Assessment:    Patient Active Problem List   Diagnosis    Neurologic gait dysfunction    Diabetes mellitus ([address]    Hypertension    Arthritis    Knee problem    Anemia due to stage 3 chronic kidney disease    Primary osteoarthritis of both knees    Moderate protein-calorie malnutrition (HCC)    Pressure injury of sacral region, stage 4 (HCC)    Pressure injury of right hip, stage 3 (HCC)    Left bundle branch block    Moderate obesity    Failure to thrive in adult    Pressure injury of sacral region, stage 3 (HCC)    Decreased dorsalis pedis pulse    ESRD on hemodialysis ([address]    Closed bicondylar fracture of distal femur, right, initial encounter ([address]    Other fracture of right femur, initial encounter for closed fracture ([address]    Closed fracture of distal ends of left radius and ulna    Closed bicondylar fracture of distal end of right femur ([address]       Plan:  Check Resp viral panel        Keren Clay MD  7:59 AM  11/28/2022

## 2022-11-29 ENCOUNTER — APPOINTMENT (OUTPATIENT)
Dept: GENERAL RADIOLOGY | Age: 72
DRG: 070 | End: 2022-11-29
Payer: MEDICARE

## 2022-11-29 PROBLEM — G93.41 METABOLIC ENCEPHALOPATHY: Status: ACTIVE | Noted: 2022-11-29

## 2022-11-29 LAB
ADENOVIRUS BY PCR: NOT DETECTED
BORDETELLA PARAPERTUSSIS BY PCR: NOT DETECTED
BORDETELLA PERTUSSIS BY PCR: NOT DETECTED
CHLAMYDOPHILIA PNEUMONIAE BY PCR: NOT DETECTED
CORONAVIRUS 229E BY PCR: NOT DETECTED
CORONAVIRUS HKU1 BY PCR: NOT DETECTED
CORONAVIRUS NL63 BY PCR: NOT DETECTED
CORONAVIRUS OC43 BY PCR: NOT DETECTED
HUMAN METAPNEUMOVIRUS BY PCR: NOT DETECTED
HUMAN RHINOVIRUS/ENTEROVIRUS BY PCR: NOT DETECTED
INFLUENZA A BY PCR: NOT DETECTED
INFLUENZA B BY PCR: NOT DETECTED
METER GLUCOSE: 163 MG/DL (ref 74–99)
METER GLUCOSE: 197 MG/DL (ref 74–99)
METER GLUCOSE: 230 MG/DL (ref 74–99)
MYCOPLASMA PNEUMONIAE BY PCR: NOT DETECTED
PARAINFLUENZA VIRUS 1 BY PCR: NOT DETECTED
PARAINFLUENZA VIRUS 2 BY PCR: NOT DETECTED
PARAINFLUENZA VIRUS 3 BY PCR: NOT DETECTED
PARAINFLUENZA VIRUS 4 BY PCR: NOT DETECTED
RESPIRATORY SYNCYTIAL VIRUS BY PCR: NOT DETECTED
SARS-COV-2, PCR: NOT DETECTED

## 2022-11-29 PROCEDURE — 6360000002 HC RX W HCPCS: Performed by: FAMILY MEDICINE

## 2022-11-29 PROCEDURE — 2060000000 HC ICU INTERMEDIATE R&B

## 2022-11-29 PROCEDURE — 73552 X-RAY EXAM OF FEMUR 2/>: CPT

## 2022-11-29 PROCEDURE — 82962 GLUCOSE BLOOD TEST: CPT

## 2022-11-29 PROCEDURE — 2580000003 HC RX 258: Performed by: FAMILY MEDICINE

## 2022-11-29 PROCEDURE — 73560 X-RAY EXAM OF KNEE 1 OR 2: CPT

## 2022-11-29 PROCEDURE — 6370000000 HC RX 637 (ALT 250 FOR IP): Performed by: FAMILY MEDICINE

## 2022-11-29 PROCEDURE — 73110 X-RAY EXAM OF WRIST: CPT

## 2022-11-29 RX ADMIN — GABAPENTIN 300 MG: 300 CAPSULE ORAL at 15:19

## 2022-11-29 RX ADMIN — HYDROCODONE BITARTRATE AND ACETAMINOPHEN 1 TABLET: 5; 325 TABLET ORAL at 21:19

## 2022-11-29 RX ADMIN — Medication 10 ML: at 21:21

## 2022-11-29 RX ADMIN — ESCITALOPRAM OXALATE 10 MG: 10 TABLET ORAL at 10:49

## 2022-11-29 RX ADMIN — METOPROLOL SUCCINATE 25 MG: 25 TABLET, EXTENDED RELEASE ORAL at 10:50

## 2022-11-29 RX ADMIN — Medication 10 ML: at 10:52

## 2022-11-29 RX ADMIN — BUSPIRONE HYDROCHLORIDE 10 MG: 10 TABLET ORAL at 10:49

## 2022-11-29 RX ADMIN — BUSPIRONE HYDROCHLORIDE 10 MG: 10 TABLET ORAL at 21:19

## 2022-11-29 RX ADMIN — HEPARIN SODIUM 5000 UNITS: 10000 INJECTION INTRAVENOUS; SUBCUTANEOUS at 05:40

## 2022-11-29 RX ADMIN — POLYETHYLENE GLYCOL 3350 17 G: 17 POWDER, FOR SOLUTION ORAL at 10:50

## 2022-11-29 RX ADMIN — INSULIN GLARGINE 6 UNITS: 100 INJECTION, SOLUTION SUBCUTANEOUS at 21:21

## 2022-11-29 RX ADMIN — HEPARIN SODIUM 5000 UNITS: 10000 INJECTION INTRAVENOUS; SUBCUTANEOUS at 15:17

## 2022-11-29 RX ADMIN — SENNOSIDES 17.2 MG: 8.6 TABLET, FILM COATED ORAL at 10:49

## 2022-11-29 RX ADMIN — HEPARIN SODIUM 5000 UNITS: 10000 INJECTION INTRAVENOUS; SUBCUTANEOUS at 21:21

## 2022-11-29 RX ADMIN — GABAPENTIN 300 MG: 300 CAPSULE ORAL at 10:50

## 2022-11-29 RX ADMIN — INSULIN LISPRO 2 UNITS: 100 INJECTION, SOLUTION INTRAVENOUS; SUBCUTANEOUS at 15:12

## 2022-11-29 RX ADMIN — Medication 2000 UNITS: at 10:49

## 2022-11-29 RX ADMIN — GABAPENTIN 300 MG: 300 CAPSULE ORAL at 21:33

## 2022-11-29 ASSESSMENT — PAIN - FUNCTIONAL ASSESSMENT
PAIN_FUNCTIONAL_ASSESSMENT: PREVENTS OR INTERFERES SOME ACTIVE ACTIVITIES AND ADLS
PAIN_FUNCTIONAL_ASSESSMENT: PREVENTS OR INTERFERES WITH MANY ACTIVE NOT PASSIVE ACTIVITIES
PAIN_FUNCTIONAL_ASSESSMENT: PREVENTS OR INTERFERES SOME ACTIVE ACTIVITIES AND ADLS

## 2022-11-29 ASSESSMENT — PAIN DESCRIPTION - LOCATION
LOCATION: BACK
LOCATION: LEG
LOCATION: LEG

## 2022-11-29 ASSESSMENT — PAIN SCALES - GENERAL
PAINLEVEL_OUTOF10: 6
PAINLEVEL_OUTOF10: 3

## 2022-11-29 ASSESSMENT — PAIN DESCRIPTION - ONSET
ONSET: ON-GOING
ONSET: ON-GOING

## 2022-11-29 ASSESSMENT — PAIN DESCRIPTION - PAIN TYPE
TYPE: ACUTE PAIN
TYPE: ACUTE PAIN

## 2022-11-29 ASSESSMENT — PAIN DESCRIPTION - FREQUENCY
FREQUENCY: CONTINUOUS
FREQUENCY: INTERMITTENT

## 2022-11-29 ASSESSMENT — PAIN DESCRIPTION - DESCRIPTORS
DESCRIPTORS: ACHING
DESCRIPTORS: DISCOMFORT;ACHING
DESCRIPTORS: ACHING

## 2022-11-29 ASSESSMENT — PAIN DESCRIPTION - ORIENTATION
ORIENTATION: RIGHT
ORIENTATION: MID
ORIENTATION: RIGHT

## 2022-11-29 ASSESSMENT — PAIN SCALES - WONG BAKER
WONGBAKER_NUMERICALRESPONSE: 0
WONGBAKER_NUMERICALRESPONSE: 0

## 2022-11-29 NOTE — PROGRESS NOTES
Department of Orthopedic Surgery        SUBJECTIVE  Pt seen and examined. Pain controlled to her leg and wrist. She does report her splint to her left wrist fell off a few days ago and she was without a splint for a few hours. Patient and nurse state that wilber has formed a wound to her right thigh from the knee immobilizer. Wound care to look at wound. OBJECTIVE    Physical    VITALS:  BP (!) 136/52   Pulse 75   Temp 98.1 °F (36.7 °C) (Oral)   Resp 16   Ht 5' 1\" (1.549 m)   Wt 204 lb 12.9 oz (92.9 kg)   SpO2 98%   BMI 38.70 kg/m²     right LE:   Dressing C/D/I with staples in place. There is a small area of dry eschar between the two incision to the lateral thigh. Mild hyperemia. No erythema or signs of infection. No drainage from incisions. Staples and sutures removed. New dressing applied and knee immobilizer placed back on patient. Bandage in place to proximal right medial thigh. Distal sensory intact  +2/4 PT and DP  +PF/DF/EHL  Compartments soft and compressible    Left UE:  Incision c/d/I with sutures in place. No erythema or signs of infection. Sutures removed without difficulty. Near full flexion and extension of the fingers. Median, ulnar, radial n intact to light touch. Brisk capillary refill. Otherwise NVI.      Data    CBC:   Lab Results   Component Value Date/Time    WBC 6.0 11/26/2022 07:02 AM    RBC 2.51 11/26/2022 07:02 AM    HGB 8.8 11/26/2022 07:02 AM    HCT 29.2 11/26/2022 07:02 AM    .3 11/26/2022 07:02 AM    MCH 35.1 11/26/2022 07:02 AM    MCHC 30.1 11/26/2022 07:02 AM    RDW 18.0 11/26/2022 07:02 AM     11/26/2022 07:02 AM    MPV 9.6 11/26/2022 07:02 AM     PT/INR:    Lab Results   Component Value Date/Time    PROTIME 10.6 11/23/2022 04:26 AM    PROTIME 15.3 06/25/2011 07:00 PM    INR 1.0 11/23/2022 04:26 AM     Xrays of the left wrist reviewed in AP/lateral/oblique demonstrating stable left distal radius ORIF with no interval changes in hardware    Xrays of the right knee and femur were reviewed demonstrating stable alignment of fractures to the right femur with stable hardware    ASSESSMENT   Over 3 weeks s/p left distal radius and right femur ORIF    PLAN    Strict NWB to the right lower extremity  Forearm weightbearing to the left upper extremity  Sutures and staples removed today  Keep incisions covered until tomorrow and then dressings can be changed to an ACE bandage over incision  Tomorrow left wrist splint can be taken off and patient to be transitioned to a left wrist cock up wrist brace  Follow up in the office upon discharge

## 2022-11-29 NOTE — PROGRESS NOTES
Department of Internal Medicine  Nephrology Attending Progress Note        SUBJECTIVE:  Mrs Carina Gallo repeat COVID studies were negative. Patient have been scheduled to be seen by orthopedics just as an outpatient for suture removals, his office was notified, patient not clear what arrangements have been made for outpatient follow-up for surgical resident possibly remove sutures while here in hospital prior to discharge? PMH  ESRD on dialysis    History of fall with fracture of distal right femur, left distal radius and ulnar  Type 2 diabetes mellitus with neuropathy, nephropathy  Hypertension  Anemia of chronic kidney disease  History of osteoarthritis  Secondary hyperparathyroid disease of renal origin  History of COVID-19 infection   History of sacral ulcer with recent debridement  Hyperlipidemia  Elevated BMI  History of sarcoidosis  History of gastroesophageal reflux disease  History of renal lithiasis  History of cholecystectomy  History of  x3  Upper endoscopy showing gastritis duodenitis  History of remote tobacco use quit 10 years ago    Physical Exam:    Vitals:    22 0656   BP: (!) 155/56   Pulse: 64   Resp: 18   Temp: 98.1 °F (36.7 °C)   SpO2: 100%       I/O last 24 hours:  Intake/Output 300/2300    Weight: 91.5--> NOT DONE    General Appearance:  awake in no apparent distress  Skin: sacral decubitus bandage  Neck:  neck- supple, no mass, non-tender and no bruits  Lungs: Distant breath sounds no wheezing or rhonchi  Heart: Regular rhythm no murmur rub     Abdominal: Abdomen soft, non-tender.  BS normal. No masses,  No organomegaly  Extremities: trace pedal edema, right knee wrapped as is left wrist  rt ecchymotic toe  Peripheral Pulses:  +2    DATA:    CBC with Differential:    Lab Results   Component Value Date/Time    WBC 6.0 2022 07:02 AM    RBC 2.51 2022 07:02 AM    HGB 8.8 2022 07:02 AM    HCT 29.2 2022 07:02 AM     2022 07:02 AM    .3 11/26/2022 07:02 AM    MCH 35.1 11/26/2022 07:02 AM    MCHC 30.1 11/26/2022 07:02 AM    RDW 18.0 11/26/2022 07:02 AM    NRBC 0.0 11/26/2022 07:02 AM    SEGSPCT 71 08/16/2013 05:00 AM    BANDSPCT 1 03/29/2016 07:37 PM    METASPCT 0.9 05/10/2020 03:45 AM    LYMPHOPCT 11.3 11/26/2022 07:02 AM    PROMYELOPCT 0.9 11/07/2022 05:59 AM    MONOPCT 4.4 11/26/2022 07:02 AM    MYELOPCT 0.9 11/26/2022 07:02 AM    BASOPCT 1.7 11/26/2022 07:02 AM    MONOSABS 0.24 11/26/2022 07:02 AM    LYMPHSABS 0.66 11/26/2022 07:02 AM    EOSABS 0.10 11/26/2022 07:02 AM    BASOSABS 0.10 11/26/2022 07:02 AM     CMP:    Lab Results   Component Value Date/Time     11/26/2022 07:02 AM    K 5.4 11/26/2022 07:02 AM    CL 98 11/26/2022 07:02 AM    CO2 27 11/26/2022 07:02 AM    BUN 35 11/26/2022 07:02 AM    CREATININE 5.2 11/26/2022 07:02 AM    GFRAA 7 10/12/2022 03:49 AM    LABGLOM 8 11/26/2022 07:02 AM    GLUCOSE 98 11/26/2022 07:02 AM    GLUCOSE 149 10/12/2011 09:59 AM    PROT 6.5 11/25/2022 02:38 PM    LABALBU 3.1 11/25/2022 02:38 PM    LABALBU 4.1 10/12/2011 09:59 AM    CALCIUM 9.7 11/26/2022 07:02 AM    BILITOT 0.4 11/25/2022 02:38 PM    ALKPHOS 140 11/25/2022 02:38 PM    AST 20 11/25/2022 02:38 PM    ALT 9 11/25/2022 02:38 PM     BMP:    Lab Results   Component Value Date/Time     11/26/2022 07:02 AM    K 5.4 11/26/2022 07:02 AM    CL 98 11/26/2022 07:02 AM    CO2 27 11/26/2022 07:02 AM    BUN 35 11/26/2022 07:02 AM    LABALBU 3.1 11/25/2022 02:38 PM    LABALBU 4.1 10/12/2011 09:59 AM    CREATININE 5.2 11/26/2022 07:02 AM    CALCIUM 9.7 11/26/2022 07:02 AM    GFRAA 7 10/12/2022 03:49 AM    LABGLOM 8 11/26/2022 07:02 AM    GLUCOSE 98 11/26/2022 07:02 AM    GLUCOSE 149 10/12/2011 09:59 AM     Magnesium:    Lab Results   Component Value Date/Time    MG 2.4 11/09/2022 04:53 AM     Phosphorus:    Lab Results   Component Value Date/Time    PHOS 6.0 11/09/2022 04:53 AM     U/A:    Lab Results   Component Value Date/Time COLORU Yellow 11/25/2022 02:38 PM    PROTEINU 100 11/25/2022 02:38 PM    PHUR 7.5 11/25/2022 02:38 PM    WBCUA PACKED 11/25/2022 02:38 PM    WBCUA 5-10 08/17/2011 10:30 PM    RBCUA 0-1 11/25/2022 02:38 PM    RBCUA 0-1 06/26/2013 03:46 PM    YEAST Present 08/11/2020 11:36 AM    BACTERIA MANY 11/25/2022 02:38 PM    CLARITYU TURBID 11/25/2022 02:38 PM    SPECGRAV 1.020 11/25/2022 02:38 PM    LEUKOCYTESUR LARGE 11/25/2022 02:38 PM    UROBILINOGEN 0.2 11/25/2022 02:38 PM    BILIRUBINUR Negative 11/25/2022 02:38 PM    BILIRUBINUR NEGATIVE 08/17/2011 10:30 PM    BLOODU SMALL 11/25/2022 02:38 PM    GLUCOSEU Negative 11/25/2022 02:38 PM    GLUCOSEU NEGATIVE 08/17/2011 10:30 PM    AMORPHOUS FEW 06/25/2011 08:50 PM      epoetin derek-epbx  10,000 Units SubCUTAneous Once per day on Mon Wed Fri    busPIRone  10 mg Oral BID    escitalopram  10 mg Oral Daily    gabapentin  300 mg Oral TID    heparin (porcine)  5,000 Units SubCUTAneous 3 times per day    insulin glargine  6 Units SubCUTAneous Nightly    metoprolol succinate  25 mg Oral Daily    midodrine  15 mg Oral Once per day on Mon Wed Fri    polyethylene glycol  17 g Oral Daily    senna  2 tablet Oral BID    vitamin D  2,000 Units Oral Daily    sodium chloride flush  5-40 mL IntraVENous 2 times per day    insulin lispro  0-8 Units SubCUTAneous TID     insulin lispro  0-4 Units SubCUTAneous Nightly      sodium chloride      dextrose       haloperidol lactate, HYDROcodone 5 mg - acetaminophen, acetaminophen, bisacodyl, magnesium hydroxide, prochlorperazine, traZODone, sodium chloride flush, sodium chloride, glucose, dextrose bolus **OR** dextrose bolus, glucagon (rDNA), dextrose    IMPRESSION/RECOMMENDATIONS:      End-stage renal disease continue dialysis Monday Wednesday Friday    Anemia of chronic disease resume ARUN therapy   Secondary hyperparathyroid disease of renal origin on ferrous citrate at home   Fracture of rt  distal  femur will have floor check with orthopedics as to what arrangements were made for removal of sutures  Fracture of left distal radius and ulnar  Orthostatic hypotension continue kwesi Thomas MD  11/29/2022 10:10 AM

## 2022-11-29 NOTE — PROGRESS NOTES
Department of Orthopedic Surgery           SUBJECTIVE  Pt seen and examined. Pain controlled to her leg and wrist. She does report her splint to her left wrist fell off a few days ago and she was without a splint for a few hours. Patient and nurse state that wilber has formed a wound to her right thigh from the knee immobilizer. Wound care to look at wound. OBJECTIVE     Physical    VITALS:  BP (!) 136/52   Pulse 75   Temp 98.1 °F (36.7 °C) (Oral)   Resp 16   Ht 5' 1\" (1.549 m)   Wt 204 lb 12.9 oz (92.9 kg)   SpO2 98%   BMI 38.70 kg/m²      right LE:   Dressing C/D/I with staples in place. There is a small area of dry eschar between the two incision to the lateral thigh. Mild hyperemia. No erythema or signs of infection. No drainage from incisions. Staples and sutures removed. New dressing applied and knee immobilizer placed back on patient. Bandage in place to proximal right medial thigh. Distal sensory intact  +2/4 PT and DP  +PF/DF/EHL  Compartments soft and compressible     Left UE:  Incision c/d/I with sutures in place. No erythema or signs of infection. Sutures removed without difficulty. Near full flexion and extension of the fingers. Median, ulnar, radial n intact to light touch. Brisk capillary refill. Otherwise NVI.       Data    CBC:         Lab Results   Component Value Date/Time     WBC 6.0 11/26/2022 07:02 AM     RBC 2.51 11/26/2022 07:02 AM     HGB 8.8 11/26/2022 07:02 AM     HCT 29.2 11/26/2022 07:02 AM     .3 11/26/2022 07:02 AM     MCH 35.1 11/26/2022 07:02 AM     MCHC 30.1 11/26/2022 07:02 AM     RDW 18.0 11/26/2022 07:02 AM      11/26/2022 07:02 AM     MPV 9.6 11/26/2022 07:02 AM      PT/INR:          Lab Results   Component Value Date/Time     PROTIME 10.6 11/23/2022 04:26 AM     PROTIME 15.3 06/25/2011 07:00 PM     INR 1.0 11/23/2022 04:26 AM      Xrays of the left wrist reviewed in AP/lateral/oblique demonstrating stable left distal radius ORIF with no interval changes in hardware     Xrays of the right knee and femur were reviewed demonstrating stable alignment of fractures to the right femur with stable hardware     ASSESSMENT   Over 3 weeks s/p left distal radius and right femur ORIF     PLAN    Strict NWB to the right lower extremity  Forearm weightbearing to the left upper extremity  Sutures and staples removed today  Keep incisions covered until tomorrow and then dressings can be changed to an ACE bandage over incision  Tomorrow left wrist splint can be taken off and patient to be transitioned to a left wrist cock up wrist brace  Continue knee immobilizer  Follow up in the office upon discharge

## 2022-11-29 NOTE — PLAN OF CARE
Problem: Safety - Adult  Goal: Free from fall injury  Recent Flowsheet Documentation  Taken 11/29/2022 0815 by Bj Daugherty RN  Free From Fall Injury: Instruct family/caregiver on patient safety     Problem: ABCDS Injury Assessment  Goal: Absence of physical injury  Recent Flowsheet Documentation  Taken 11/29/2022 0815 by Bj Daugherty RN  Absence of Physical Injury: Implement safety measures based on patient assessment

## 2022-11-29 NOTE — PROGRESS NOTES
supply notified of need for wrist brace. Order and face sheet faxed.     Electronically signed by Yasmin Mckinney RN on 11/29/2022 at 2:14 PM

## 2022-11-29 NOTE — PROGRESS NOTES
Perfect Serve to Orthopedic surgery with the following message: This patient had an ORIF on 11/4. She is now back in the hospital with UTI. Planning discharge to SNF. Would you like us to remove staples while she is here?     Electronically signed by Marcial Sorto RN on 11/29/2022 at 10:20 AM

## 2022-11-29 NOTE — PROGRESS NOTES
Perfect Serve to Orthopedic surgery with the following message: This patient had an ORIF on 11/4. She is now back in the hospital with UTI. Planning discharge to SNF. Would you like us to remove staples while she is here? Electronically signed by Tomasa Quiroz RN on 11/29/2022 at 10:20 AM      Dr. Fariba Bennett team to see patient before she leaves. X-rays ordered.     ,Electronically signed by Tomasa Quiroz RN on 11/29/2022 at 11:05 AM

## 2022-11-29 NOTE — PROGRESS NOTES
Patient respiratory viral panel was completely negative including COVID testing per PCR. Patient does not have COVID.

## 2022-11-29 NOTE — PROGRESS NOTES
Perfect Serve to CHEL[facility] Meyer with the following message: Can you re-visit patient. Kirstie is concerned by developing rash to abdominal folds and area of breakdown  to thigh. Thank you.     Electronically signed by Nathan Zelaya RN on 11/29/2022 at 10:23 AM

## 2022-11-29 NOTE — CARE COORDINATION
Social work / Discharge Planning:         [address]. Awaiting precert approval for discharge to [facility]. [facility] and transport form completed.     Electronically signed by SHANTA Vasquez on 11/29/2022 at 11:18 AM

## 2022-11-29 NOTE — PROGRESS NOTES
02:38 PM    AST 20 11/25/2022 02:38 PM    ALT 9 11/25/2022 02:38 PM    Resp viral panel negative including COV-19 PCR    Assessment:    Patient Active Problem List   Diagnosis    Neurologic gait dysfunction    Diabetes mellitus ([address]    Hypertension    Arthritis    Knee problem    Anemia due to stage 3 chronic kidney disease    Primary osteoarthritis of both knees    Moderate protein-calorie malnutrition (HCC)    Pressure injury of sacral region, stage 4 (HCC)    Pressure injury of right hip, stage 3 (HCC)    Left bundle branch block    Moderate obesity    Failure to thrive in adult    Pressure injury of sacral region, stage 3 (HCC)    Decreased dorsalis pedis pulse    ESRD on hemodialysis ([address]    Closed bicondylar fracture of distal femur, right, initial encounter ([address]    Other fracture of right femur, initial encounter for closed fracture ([address]    Closed fracture of distal ends of left radius and ulna    Closed bicondylar fracture of distal end of right femur [facility])       Plan:  Discharge        Helene Hendricks MD  7:31 AM  11/29/2022

## 2022-11-30 VITALS
SYSTOLIC BLOOD PRESSURE: 138 MMHG | WEIGHT: 199.52 LBS | RESPIRATION RATE: 18 BRPM | DIASTOLIC BLOOD PRESSURE: 79 MMHG | TEMPERATURE: 99 F | OXYGEN SATURATION: 100 % | HEIGHT: 61 IN | BODY MASS INDEX: 37.67 KG/M2 | HEART RATE: 70 BPM

## 2022-11-30 LAB
ANION GAP SERPL CALCULATED.3IONS-SCNC: 13 MMOL/L (ref 7–16)
BUN BLDV-MCNC: 32 MG/DL (ref 6–23)
CALCIUM SERPL-MCNC: 9.1 MG/DL (ref 8.6–10.2)
CHLORIDE BLD-SCNC: 102 MMOL/L (ref 98–107)
CO2: 24 MMOL/L (ref 22–29)
CREAT SERPL-MCNC: 6.2 MG/DL (ref 0.5–1)
GFR SERPL CREATININE-BSD FRML MDRD: 7 ML/MIN/1.73
GLUCOSE BLD-MCNC: 108 MG/DL (ref 74–99)
HCT VFR BLD CALC: 28.9 % (ref 34–48)
HEMOGLOBIN: 9.2 G/DL (ref 11.5–15.5)
HEPATITIS B SURFACE ANTIGEN INTERPRETATION: NORMAL
MCH RBC QN AUTO: 36.2 PG (ref 26–35)
MCHC RBC AUTO-ENTMCNC: 31.8 % (ref 32–34.5)
MCV RBC AUTO: 113.8 FL (ref 80–99.9)
METER GLUCOSE: 108 MG/DL (ref 74–99)
PDW BLD-RTO: 18.1 FL (ref 11.5–15)
PLATELET # BLD: 239 E9/L (ref 130–450)
PMV BLD AUTO: 9.7 FL (ref 7–12)
POTASSIUM REFLEX MAGNESIUM: 3.8 MMOL/L (ref 3.5–5)
RBC # BLD: 2.54 E12/L (ref 3.5–5.5)
SARS-COV-2, NAAT: NOT DETECTED
SODIUM BLD-SCNC: 139 MMOL/L (ref 132–146)
WBC # BLD: 7.9 E9/L (ref 4.5–11.5)

## 2022-11-30 PROCEDURE — 85027 COMPLETE CBC AUTOMATED: CPT

## 2022-11-30 PROCEDURE — 87340 HEPATITIS B SURFACE AG IA: CPT

## 2022-11-30 PROCEDURE — 6370000000 HC RX 637 (ALT 250 FOR IP): Performed by: FAMILY MEDICINE

## 2022-11-30 PROCEDURE — 87635 SARS-COV-2 COVID-19 AMP PRB: CPT

## 2022-11-30 PROCEDURE — 82962 GLUCOSE BLOOD TEST: CPT

## 2022-11-30 PROCEDURE — 36415 COLL VENOUS BLD VENIPUNCTURE: CPT

## 2022-11-30 PROCEDURE — 6360000002 HC RX W HCPCS: Performed by: FAMILY MEDICINE

## 2022-11-30 PROCEDURE — 90935 HEMODIALYSIS ONE EVALUATION: CPT

## 2022-11-30 PROCEDURE — 6360000002 HC RX W HCPCS: Performed by: INTERNAL MEDICINE

## 2022-11-30 PROCEDURE — 80048 BASIC METABOLIC PNL TOTAL CA: CPT

## 2022-11-30 RX ORDER — BENZONATATE 100 MG/1
100 CAPSULE ORAL 3 TIMES DAILY PRN
Status: DISCONTINUED | OUTPATIENT
Start: 2022-11-30 | End: 2022-11-30 | Stop reason: HOSPADM

## 2022-11-30 RX ORDER — BENZONATATE 100 MG/1
100 CAPSULE ORAL 3 TIMES DAILY PRN
DISCHARGE
Start: 2022-11-30 | End: 2022-12-07

## 2022-11-30 RX ADMIN — ESCITALOPRAM OXALATE 10 MG: 10 TABLET ORAL at 14:34

## 2022-11-30 RX ADMIN — HEPARIN SODIUM 5000 UNITS: 10000 INJECTION INTRAVENOUS; SUBCUTANEOUS at 06:01

## 2022-11-30 RX ADMIN — METOPROLOL SUCCINATE 25 MG: 25 TABLET, EXTENDED RELEASE ORAL at 14:34

## 2022-11-30 RX ADMIN — EPOETIN ALFA-EPBX 10000 UNITS: 10000 INJECTION, SOLUTION INTRAVENOUS; SUBCUTANEOUS at 09:00

## 2022-11-30 RX ADMIN — HYDROCODONE BITARTRATE AND ACETAMINOPHEN 1 TABLET: 5; 325 TABLET ORAL at 09:11

## 2022-11-30 RX ADMIN — GABAPENTIN 300 MG: 300 CAPSULE ORAL at 14:34

## 2022-11-30 RX ADMIN — HYDROCODONE BITARTRATE AND ACETAMINOPHEN 1 TABLET: 5; 325 TABLET ORAL at 15:26

## 2022-11-30 RX ADMIN — MIDODRINE HYDROCHLORIDE 15 MG: 5 TABLET ORAL at 06:50

## 2022-11-30 RX ADMIN — HEPARIN SODIUM 5000 UNITS: 10000 INJECTION INTRAVENOUS; SUBCUTANEOUS at 14:34

## 2022-11-30 RX ADMIN — Medication 2000 UNITS: at 14:34

## 2022-11-30 ASSESSMENT — PAIN DESCRIPTION - DESCRIPTORS: DESCRIPTORS: ACHING

## 2022-11-30 ASSESSMENT — PAIN DESCRIPTION - ORIENTATION: ORIENTATION: LEFT

## 2022-11-30 ASSESSMENT — PAIN SCALES - GENERAL
PAINLEVEL_OUTOF10: 0
PAINLEVEL_OUTOF10: 7

## 2022-11-30 ASSESSMENT — PAIN DESCRIPTION - LOCATION: LOCATION: HAND

## 2022-11-30 ASSESSMENT — PAIN - FUNCTIONAL ASSESSMENT: PAIN_FUNCTIONAL_ASSESSMENT: PREVENTS OR INTERFERES SOME ACTIVE ACTIVITIES AND ADLS

## 2022-11-30 NOTE — PROGRESS NOTES
Department of Internal Medicine  Nephrology Attending Progress Note        SUBJECTIVE:  Mrs Kaylin Marte complaining of headache, neck pain blames the bed. She is also complaining of a nonproductive cough, for which she is asking for some medication. is for return to nursing facility today. PMH  ESRD on dialysis    History of fall with fracture of distal right femur, left distal radius and ulnar  Type 2 diabetes mellitus with neuropathy, nephropathy  Hypertension  Anemia of chronic kidney disease  History of osteoarthritis  Secondary hyperparathyroid disease of renal origin  History of COVID-19 infection   History of sacral ulcer with recent debridement  Hyperlipidemia  Elevated BMI  History of sarcoidosis  History of gastroesophageal reflux disease  History of renal lithiasis  History of cholecystectomy  History of  x3  Upper endoscopy showing gastritis duodenitis  History of remote tobacco use quit 10 years ago    Physical Exam:    Vitals:    22 1115   BP: 138/79   Pulse: 70   Resp: 18   Temp: 99 °F (37.2 °C)   SpO2:        I/O last 24 hours:  Intake/Output inaccurate  Weight: 91.5--> NOT DONE--> not done    General Appearance:  awake in no apparent distress  Skin: sacral decubitus bandage  Neck:  neck- supple, no mass, non-tender and no bruits  Lungs: Distant breath sounds no wheezing or rhonchi  Heart: Regular rhythm no murmur rub     Abdominal: Abdomen soft, non-tender.  BS normal. No masses,  No organomegaly  Extremities: trace pedal edema, right knee wrapped as is left wrist  rt ecchymotic toe  Peripheral Pulses:  +2    DATA:    CBC with Differential:    Lab Results   Component Value Date/Time    WBC 7.9 2022 07:25 AM    RBC 2.54 2022 07:25 AM    HGB 9.2 2022 07:25 AM    HCT 28.9 2022 07:25 AM     2022 07:25 AM    .8 2022 07:25 AM    MCH 36.2 2022 07:25 AM    MCHC 31.8 2022 07:25 AM    RDW 18.1 2022 11/25/2022 02:38 PM    WBCUA 5-10 08/17/2011 10:30 PM    RBCUA 0-1 11/25/2022 02:38 PM    RBCUA 0-1 06/26/2013 03:46 PM    YEAST Present 08/11/2020 11:36 AM    BACTERIA MANY 11/25/2022 02:38 PM    CLARITYU TURBID 11/25/2022 02:38 PM    SPECGRAV 1.020 11/25/2022 02:38 PM    LEUKOCYTESUR LARGE 11/25/2022 02:38 PM    UROBILINOGEN 0.2 11/25/2022 02:38 PM    BILIRUBINUR Negative 11/25/2022 02:38 PM    BILIRUBINUR NEGATIVE 08/17/2011 10:30 PM    BLOODU SMALL 11/25/2022 02:38 PM    GLUCOSEU Negative 11/25/2022 02:38 PM    GLUCOSEU NEGATIVE 08/17/2011 10:30 PM    AMORPHOUS FEW 06/25/2011 08:50 PM      epoetin derek-epbx  10,000 Units SubCUTAneous Once per day on Mon Wed Fri    busPIRone  10 mg Oral BID    escitalopram  10 mg Oral Daily    gabapentin  300 mg Oral TID    heparin (porcine)  5,000 Units SubCUTAneous 3 times per day    insulin glargine  6 Units SubCUTAneous Nightly    metoprolol succinate  25 mg Oral Daily    midodrine  15 mg Oral Once per day on Mon Wed Fri    polyethylene glycol  17 g Oral Daily    senna  2 tablet Oral BID    vitamin D  2,000 Units Oral Daily    sodium chloride flush  5-40 mL IntraVENous 2 times per day    insulin lispro  0-8 Units SubCUTAneous TID WC    insulin lispro  0-4 Units SubCUTAneous Nightly      sodium chloride      dextrose       haloperidol lactate, HYDROcodone 5 mg - acetaminophen, acetaminophen, bisacodyl, magnesium hydroxide, prochlorperazine, traZODone, sodium chloride flush, sodium chloride, glucose, dextrose bolus **OR** dextrose bolus, glucagon (rDNA), dextrose    IMPRESSION/RECOMMENDATIONS:      End-stage renal disease continue dialysis Monday Wednesday Friday    Anemia of chronic disease resume ARUN therapy   Secondary hyperparathyroid disease of renal origin    Fracture of rt  distal  femur sutures removed  Fracture of left distal radius and ulnar  Orthostatic hypotension continue midodrine  Cough we will prescribe some Tessalon Perles  Neck pain some Voltaren gel    Archie Maldonado MD  11/30/2022 12:28 PM

## 2022-11-30 NOTE — FLOWSHEET NOTE
11/30/22 1115   Vital Signs   /79   Temp 99 °F (37.2 °C)   Heart Rate 70   Resp 18   Post-Hemodialysis Assessment   Post-Treatment Procedures Blood returned; Access bleeding time < 10 minutes   Machine Disinfection Process Acid/Vinegar Clean;Heat Disinfect; Exterior Machine Disinfection   Rinseback Volume (ml) 300 ml   Dialyzer Clearance Lightly streaked   Duration of Treatment (minutes) 210 minutes   Heparin Amount Administered During Treatment (mL) 0 mL   Hemodialysis Intake (ml) 300 ml   Hemodialysis Output (ml) 2000 ml   NET Removed (ml) 1700   Tolerated Treatment Good   Patient Response to Treatment 1700ml removed   Time Off 1103   Patient Disposition Return to room   Tolerated  HD 3.5hr on  3k  2.5ca bath, 1700 ml removed with out difficulty. Needles removed and pressure held until hemostasis achieved and dressing applied. Report to RN, patient remains in care of staff.

## 2022-11-30 NOTE — CARE COORDINATION
Social work / Discharge Planning:         Precert was denied by insurance for return to [facility]. Peer to peer needs to be done by 1:30pm by calling [phone] option 5. ID# [id number]. RN will update physician. Electronically signed by SHANTA Jaeger on 11/30/2022 at 10:07 AM     Spoke with Dr Charla Bowser, he is not interested in doing peer to peer at this time. Electronically signed by Omkar Wilson RN on 11/30/2022 at 10:15 AM         RN spoke to Dr. Charla Bowser. He is not going to do a peer to peer. Patient will return to [facility]. [facility] will transport at 3:30pm after dialysis. Social work updated RN, facility liaison and message left for daughter. Svitlana Davenport also updated.    Electronically signed by SHANTA Jaeger on 11/30/2022 at 10:37 AM

## 2022-11-30 NOTE — PROGRESS NOTES
Physician Progress Note      Bernice Wang  CSN #:                  [id number]  :                       1950  ADMIT DATE:       2022 12:54 PM  [address] [place] DATE:  RESPONDING  PROVIDER #:        Kiarra Marroquin MD          QUERY TEXT:    Dear Attending Physician,    Pt admitted with acute confusion. Noted documentation of  \" Acute confusion   like related to Percocet use \" per ID and \" Metabolic Encephalopathy \" per PCP   . If possible, please document in progress notes and discharge summary   if you are evaluating and /or treating any of the following: The medical record reflects the following:  Risk Factors: recent fracture of her left arm and a severe fracture of the   right femur, in NH developed confusion , on pain medication, UTI ruled out per   ID- IV ATB discontinued, ESRD  Clinical Indicators: Per ED ,\". [place] Patient does appear to be confused and this   was confirmed by family. She was not this way 24 hours ago per daughter. [place] \"     Per H/P,\". ..from dialysis at the nursing home due to acute confusion. [facility] [place] [facility] \"     PCP  - The patient this morning is completely alert and oriented, knows   who I am, knows where she is. [place] \"  Per ID  ,\". .. Acute confusion like   related to Percocet use. Asymptomatic bacteriuria. [facility] .No need for further   antibiotics . [facility] [place] \"  Per PCP Query response ,\". .. This patient has Metabolic   Encephalopathy. [place] \"  Treatment: Norco ,Neurontin, Tylenol, No Percocet ordered in pt    Thank you,  Maya Weiss RN CCDS  Clinical Documentation Improvement Specialist  Options provided:  -- Acute confusion likely due to Percocet use confirmed and Metabolic   Encephalopathy ruled out  -- Metabolic Encephalopathy confirmed and acute confusion likely due to   Percocet use ruled out, Please specify the cause of the Metabolic   Encephalopathy.   -- Other - I will add my own diagnosis  -- Disagree - Not applicable / Not valid  -- Disagree - Clinically unable to determine / Unknown  -- Refer to Clinical Documentation Reviewer    PROVIDER RESPONSE TEXT:    After study, Metabolic Encephalopathy confirmed and acute confusion likely due   to Percocet use ruled out.  unknown    Query created by: Jeannie Leonard on 11/29/2022 1:38 PM      Electronically signed by:  Juana Domingo MD 11/29/2022 6:59 PM

## 2022-11-30 NOTE — PROGRESS NOTES
Pt has 2-3 sutures left in proximal right thigh (near groin).   Will contact ortho in AM for plan for removal.

## 2022-11-30 NOTE — PROGRESS NOTES
Subjective: The patient is awake and alert. No problems overnight. Denies chest pain, angina, and dyspnea. Denies abdominal pain. Tolerating diet. No nausea or vomiting. Awaiting precert to return to [facility]    Objective:    BP (!) 141/74   Pulse 75   Temp 99.1 °F (37.3 °C) (Oral)   Resp 18   Ht 5' 1\" (1.549 m)   Wt 199 lb 8.3 oz (90.5 kg)   SpO2 100%   BMI 37.70 kg/m²     Heart:  RRR, no murmurs, gallops, or rubs.   Lungs:  CTA bilaterally, no wheeze, rales or rhonchi  Abd: bowel sounds present, nontender, nondistended, no masses  Extrem:  No clubbing, cyanosis, or edema    CBC with Differential:    Lab Results   Component Value Date/Time    WBC 6.0 11/26/2022 07:02 AM    RBC 2.51 11/26/2022 07:02 AM    HGB 8.8 11/26/2022 07:02 AM    HCT 29.2 11/26/2022 07:02 AM     11/26/2022 07:02 AM    .3 11/26/2022 07:02 AM    MCH 35.1 11/26/2022 07:02 AM    MCHC 30.1 11/26/2022 07:02 AM    RDW 18.0 11/26/2022 07:02 AM    NRBC 0.0 11/26/2022 07:02 AM    SEGSPCT 71 08/16/2013 05:00 AM    BANDSPCT 1 03/29/2016 07:37 PM    METASPCT 0.9 05/10/2020 03:45 AM    LYMPHOPCT 11.3 11/26/2022 07:02 AM    PROMYELOPCT 0.9 11/07/2022 05:59 AM    MONOPCT 4.4 11/26/2022 07:02 AM    MYELOPCT 0.9 11/26/2022 07:02 AM    BASOPCT 1.7 11/26/2022 07:02 AM    MONOSABS 0.24 11/26/2022 07:02 AM    LYMPHSABS 0.66 11/26/2022 07:02 AM    EOSABS 0.10 11/26/2022 07:02 AM    BASOSABS 0.10 11/26/2022 07:02 AM     CMP:    Lab Results   Component Value Date/Time     11/26/2022 07:02 AM    K 5.4 11/26/2022 07:02 AM    CL 98 11/26/2022 07:02 AM    CO2 27 11/26/2022 07:02 AM    BUN 35 11/26/2022 07:02 AM    CREATININE 5.2 11/26/2022 07:02 AM    GFRAA 7 10/12/2022 03:49 AM    LABGLOM 8 11/26/2022 07:02 AM    GLUCOSE 98 11/26/2022 07:02 AM    GLUCOSE 149 10/12/2011 09:59 AM    PROT 6.5 11/25/2022 02:38 PM    LABALBU 3.1 11/25/2022 02:38 PM    LABALBU 4.1 10/12/2011 09:59 AM    CALCIUM 9.7 11/26/2022 07:02 AM    BILITOT 0.4 11/25/2022 02:38 PM    ALKPHOS 140 11/25/2022 02:38 PM    AST 20 11/25/2022 02:38 PM    ALT 9 11/25/2022 02:38 PM    Resp viral panel neg    Assessment:    Patient Active Problem List   Diagnosis    Neurologic gait dysfunction    Diabetes mellitus ([address]    Hypertension    Arthritis    Knee problem    Anemia due to stage 3 chronic kidney disease    Primary osteoarthritis of both knees    Moderate protein-calorie malnutrition (HCC)    Pressure injury of sacral region, stage 4 (HCC)    Pressure injury of right hip, stage 3 (HCC)    Left bundle branch block    Moderate obesity    Failure to thrive in adult    Pressure injury of sacral region, stage 3 (HCC)    Decreased dorsalis pedis pulse    ESRD on hemodialysis ([address]    Closed bicondylar fracture of distal femur, right, initial encounter ([address]    Other fracture of right femur, initial encounter for closed fracture ([address]    Closed fracture of distal ends of left radius and ulna    Closed bicondylar fracture of distal end of right femur ([address]    Metabolic encephalopathy       Plan:  Discharge to SNF        Mariano Quick MD  7:56 AM  11/30/2022

## 2022-12-02 LAB
ALBUMIN SERPL-MCNC: 2.7 G/DL (ref 3.5–5.2)
ALP BLD-CCNC: 104 U/L (ref 35–104)
ALT SERPL-CCNC: 5 U/L (ref 0–32)
ANION GAP SERPL CALCULATED.3IONS-SCNC: 14 MMOL/L (ref 7–16)
AST SERPL-CCNC: 17 U/L (ref 0–31)
BILIRUB SERPL-MCNC: 0.3 MG/DL (ref 0–1.2)
BUN BLDV-MCNC: 40 MG/DL (ref 6–23)
CALCIUM SERPL-MCNC: 9.6 MG/DL (ref 8.6–10.2)
CHLORIDE BLD-SCNC: 100 MMOL/L (ref 98–107)
CO2: 26 MMOL/L (ref 22–29)
CREAT SERPL-MCNC: 6 MG/DL (ref 0.5–1)
GFR SERPL CREATININE-BSD FRML MDRD: 7 ML/MIN/1.73
GLUCOSE BLD-MCNC: 70 MG/DL (ref 74–99)
HAV IGM SER IA-ACNC: NORMAL
HCT VFR BLD CALC: 30.3 % (ref 34–48)
HEMOGLOBIN: 9.3 G/DL (ref 11.5–15.5)
HEPATITIS B CORE IGM ANTIBODY: NORMAL
HEPATITIS B SURFACE ANTIGEN INTERPRETATION: NORMAL
HEPATITIS C ANTIBODY INTERPRETATION: NORMAL
MCH RBC QN AUTO: 34.8 PG (ref 26–35)
MCHC RBC AUTO-ENTMCNC: 30.7 % (ref 32–34.5)
MCV RBC AUTO: 113.5 FL (ref 80–99.9)
PDW BLD-RTO: 17.3 FL (ref 11.5–15)
PLATELET # BLD: 240 E9/L (ref 130–450)
PMV BLD AUTO: 10.9 FL (ref 7–12)
POTASSIUM SERPL-SCNC: 5.2 MMOL/L (ref 3.5–5)
RBC # BLD: 2.67 E12/L (ref 3.5–5.5)
SODIUM BLD-SCNC: 140 MMOL/L (ref 132–146)
TOTAL PROTEIN: 6.5 G/DL (ref 6.4–8.3)
WBC # BLD: 6 E9/L (ref 4.5–11.5)

## 2022-12-07 ENCOUNTER — OUTSIDE SERVICES (OUTPATIENT)
Dept: PRIMARY CARE CLINIC | Age: 72
End: 2022-12-07

## 2022-12-07 DIAGNOSIS — Z79.4 TYPE 2 DIABETES MELLITUS WITH HYPERGLYCEMIA, WITH LONG-TERM CURRENT USE OF INSULIN (HCC): ICD-10-CM

## 2022-12-07 DIAGNOSIS — R26.9 NEUROLOGIC GAIT DYSFUNCTION: ICD-10-CM

## 2022-12-07 DIAGNOSIS — N18.6 ESRD ON HEMODIALYSIS (HCC): ICD-10-CM

## 2022-12-07 DIAGNOSIS — R62.7 FAILURE TO THRIVE IN ADULT: ICD-10-CM

## 2022-12-07 DIAGNOSIS — Z99.2 ESRD ON HEMODIALYSIS (HCC): ICD-10-CM

## 2022-12-07 DIAGNOSIS — S72.431S CLOSED BICONDYLAR FRACTURE OF RIGHT FEMUR, SEQUELA: ICD-10-CM

## 2022-12-07 DIAGNOSIS — E66.8 MODERATE OBESITY: ICD-10-CM

## 2022-12-07 DIAGNOSIS — M17.0 PRIMARY OSTEOARTHRITIS OF BOTH KNEES: ICD-10-CM

## 2022-12-07 DIAGNOSIS — M19.90 ARTHRITIS: ICD-10-CM

## 2022-12-07 DIAGNOSIS — E44.0 MODERATE PROTEIN-CALORIE MALNUTRITION (HCC): ICD-10-CM

## 2022-12-07 DIAGNOSIS — E11.65 TYPE 2 DIABETES MELLITUS WITH HYPERGLYCEMIA, WITH LONG-TERM CURRENT USE OF INSULIN (HCC): ICD-10-CM

## 2022-12-07 DIAGNOSIS — S72.421S CLOSED BICONDYLAR FRACTURE OF RIGHT FEMUR, SEQUELA: ICD-10-CM

## 2022-12-07 DIAGNOSIS — G93.41 METABOLIC ENCEPHALOPATHY: Primary | ICD-10-CM

## 2022-12-07 LAB
HCT VFR BLD CALC: 32.6 % (ref 34–48)
HEMOGLOBIN: 10.2 G/DL (ref 11.5–15.5)

## 2022-12-09 LAB
HCT VFR BLD CALC: 31.2 % (ref 34–48)
HEMOGLOBIN: 9.7 G/DL (ref 11.5–15.5)

## 2022-12-12 LAB
HCT VFR BLD CALC: 30.2 % (ref 34–48)
HEMOGLOBIN: 9.3 G/DL (ref 11.5–15.5)

## 2022-12-14 LAB
ALBUMIN SERPL-MCNC: 2.6 G/DL (ref 3.5–5.2)
ALP BLD-CCNC: 84 U/L (ref 35–104)
ALT SERPL-CCNC: 6 U/L (ref 0–32)
ANION GAP SERPL CALCULATED.3IONS-SCNC: 11 MMOL/L (ref 7–16)
AST SERPL-CCNC: 19 U/L (ref 0–31)
BILIRUB SERPL-MCNC: 0.3 MG/DL (ref 0–1.2)
BUN BLDV-MCNC: 43 MG/DL (ref 6–23)
CALCIUM SERPL-MCNC: 9.4 MG/DL (ref 8.6–10.2)
CHLORIDE BLD-SCNC: 97 MMOL/L (ref 98–107)
CO2: 30 MMOL/L (ref 22–29)
CREAT SERPL-MCNC: 4.3 MG/DL (ref 0.5–1)
GFR SERPL CREATININE-BSD FRML MDRD: 10 ML/MIN/1.73
GLUCOSE BLD-MCNC: 138 MG/DL (ref 74–99)
HCT VFR BLD CALC: 32.7 % (ref 34–48)
HEMOGLOBIN: 10.2 G/DL (ref 11.5–15.5)
MCH RBC QN AUTO: 34.6 PG (ref 26–35)
MCHC RBC AUTO-ENTMCNC: 31.2 % (ref 32–34.5)
MCV RBC AUTO: 110.8 FL (ref 80–99.9)
PDW BLD-RTO: 17.3 FL (ref 11.5–15)
PLATELET # BLD: 128 E9/L (ref 130–450)
PMV BLD AUTO: 11.7 FL (ref 7–12)
POTASSIUM SERPL-SCNC: 5.1 MMOL/L (ref 3.5–5)
RBC # BLD: 2.95 E12/L (ref 3.5–5.5)
SODIUM BLD-SCNC: 138 MMOL/L (ref 132–146)
TOTAL PROTEIN: 5.8 G/DL (ref 6.4–8.3)
WBC # BLD: 11.1 E9/L (ref 4.5–11.5)

## 2022-12-16 LAB
HCT VFR BLD CALC: 29.8 % (ref 34–48)
HEMOGLOBIN: 9.1 G/DL (ref 11.5–15.5)

## 2022-12-19 LAB
HCT VFR BLD CALC: 28.2 % (ref 34–48)
HEMOGLOBIN: 8.6 G/DL (ref 11.5–15.5)

## 2022-12-21 ENCOUNTER — TELEPHONE (OUTPATIENT)
Dept: ORTHOPEDIC SURGERY | Age: 72
End: 2022-12-21

## 2022-12-21 DIAGNOSIS — S52.602A CLOSED FRACTURE OF DISTAL ENDS OF LEFT RADIUS AND ULNA, INITIAL ENCOUNTER: ICD-10-CM

## 2022-12-21 DIAGNOSIS — S72.8X1A OTHER FRACTURE OF RIGHT FEMUR, INITIAL ENCOUNTER FOR CLOSED FRACTURE (HCC): Primary | ICD-10-CM

## 2022-12-21 DIAGNOSIS — S52.502A CLOSED FRACTURE OF DISTAL ENDS OF LEFT RADIUS AND ULNA, INITIAL ENCOUNTER: ICD-10-CM

## 2022-12-21 LAB
HCT VFR BLD CALC: 30.4 % (ref 34–48)
HEMOGLOBIN: 9.3 G/DL (ref 11.5–15.5)

## 2022-12-22 ENCOUNTER — OFFICE VISIT (OUTPATIENT)
Dept: ORTHOPEDIC SURGERY | Age: 72
End: 2022-12-22

## 2022-12-22 VITALS — HEIGHT: 61 IN | WEIGHT: 190 LBS | BODY MASS INDEX: 35.87 KG/M2

## 2022-12-22 DIAGNOSIS — S72.8X1A OTHER FRACTURE OF RIGHT FEMUR, INITIAL ENCOUNTER FOR CLOSED FRACTURE (HCC): ICD-10-CM

## 2022-12-22 DIAGNOSIS — S72.8X1A OTHER FRACTURE OF RIGHT FEMUR, INITIAL ENCOUNTER FOR CLOSED FRACTURE (HCC): Primary | ICD-10-CM

## 2022-12-22 DIAGNOSIS — B99.9 INFECTION: ICD-10-CM

## 2022-12-22 PROCEDURE — 99024 POSTOP FOLLOW-UP VISIT: CPT | Performed by: ORTHOPAEDIC SURGERY

## 2022-12-22 RX ORDER — SULFAMETHOXAZOLE AND TRIMETHOPRIM 800; 160 MG/1; MG/1
1 TABLET ORAL 2 TIMES DAILY
Qty: 20 TABLET | Refills: 0 | Status: SHIPPED | OUTPATIENT
Start: 2022-12-22 | End: 2023-01-01

## 2022-12-22 RX ORDER — DOXYCYCLINE HYCLATE 100 MG/1
100 CAPSULE ORAL 2 TIMES DAILY
Qty: 28 CAPSULE | Refills: 0 | Status: SHIPPED | OUTPATIENT
Start: 2022-12-22 | End: 2023-01-05

## 2022-12-22 NOTE — PROGRESS NOTES
HPI: Patient presents today 7 weeks s/p left distal radius ORIF and right distal femur ORIF. Patient reports little pain to her left wrist. She states the facility has not been doing any dressing changes to her right leg until yesterday. Physical Exam:   Left upper extremity: incision well healed. No erythema or signs of infection. Full flexion and extension of the fingers. Minimal tenderness over the fracture site. Brisk capillary refill. Otherwise NVI. Right lower extremity: lateral femur incision with 2 areas of necrosis and yellow exudate to the incision. This was cultured today. Surrounding erythema laterally. Remaining incisions healing well. Intact PF/DF of the ankle. Brisk capillary refill. Otherwise NVI. Xray: x-rays of the left wrist were obtained today in the office and reviewed with the patient. 3 views: AP/lateral/oblique : demonstrate stable left distal radius ORIF. Stable hardware  Impression: stable left distal radius ORIF     Xray: x-rays of the right femur were obtained today in the office and reviewed with the patient. 2 views: AP/lateral : demonstrate stable fixation of right distal femur fracture with stable hardware  Impression: stable fixation of right distal femur fracture with stable hardware     Assessment: 7 weeks s/p left distal radius ORIF and right distal femur ORIF    Plan:   Okay to discontinue the brace to the left wrist and right lower extremity. Patient start Bactrim and doxycycline  Lab work ordered at today's visit. Patient can be weightbearing as tolerated to the left upper extremity. Patient to remain nonweightbearing to the right lower extremity. We will discontinue the knee brace at this time. Patient states she has been seen by wound care which comes once a week. Recommended starting daily dressing changes to the right leg at today's visit. Follow up in 1 week for a wound check and to review lab work. All questions and concerns answered.       I have seen and evaluated the patient and agree with the above assessment and plan on today's visit. I have performed the key components of the history and physical examination with significant findings of 7 weeks distal radius ORIF doing well. She is also status post retrograde nailing of a complex distal femur fracture. She reports since last visit she has had some increased drainage laterally. She reports she does notice this and had wound care addressing this. She denies any fevers or chills. Examination did not reveal any tunneling into the hardware. Cultures were obtained. Patient was started on antibiotics. Local wound cares explained and provided. Labs were ordered. She will follow-up next week for further evaluation and to review labs. . I concur with the findings and plan as documented.     Hossein Carlton MD  12/22/2022

## 2022-12-22 NOTE — DISCHARGE SUMMARY
Admit Date: 11/3/2022  8:16 PM   Discharge Date: 11/10/2022    Patient Active Problem List   Diagnosis    Neurologic gait dysfunction    Diabetes mellitus ([address]    Hypertension    Arthritis    Knee problem    Anemia due to stage 3 chronic kidney disease    Primary osteoarthritis of both knees    Moderate protein-calorie malnutrition (HCC)    Pressure injury of sacral region, stage 4 (HCC)    Pressure injury of right hip, stage 3 (HCC)    Left bundle branch block    Moderate obesity    Failure to thrive in adult    Pressure injury of sacral region, stage 3 (HCC)    Decreased dorsalis pedis pulse    ESRD on hemodialysis ([address]    Closed bicondylar fracture of distal femur, right, initial encounter ([address]    Other fracture of right femur, initial encounter for closed fracture ([address]    Closed fracture of distal ends of left radius and ulna    Closed bicondylar fracture of distal end of right femur ([address]    Metabolic encephalopathy        Present on Admission:   Closed bicondylar fracture of distal femur, right, initial encounter ([address]   Other fracture of right femur, initial encounter for closed fracture ([address]   Closed fracture of distal ends of left radius and ulna   Closed bicondylar fracture of distal end of right femur ([address]          Medication List        START taking these medications      heparin (porcine) 09997 UNIT/ML injection  Inject 0.5 mLs into the skin every 8 hours     * insulin lispro 100 UNIT/ML Soln injection vial  Commonly known as: HUMALOG  Inject 0-8 Units into the skin 3 times daily (with meals)     * insulin lispro 100 UNIT/ML Soln injection vial  Commonly known as: HUMALOG  Inject 0-4 Units into the skin nightly           * This list has 2 medication(s) that are the same as other medications prescribed for you. Read the directions carefully, and ask your doctor or other care provider to review them with you.                 CHANGE how you take these medications      busPIRone 10 MG tablet  Commonly known as: BUSPAR  Take 1 tablet by mouth 2 times daily  What changed: Another medication with the same name was removed. Continue taking this medication, and follow the directions you see here. CONTINUE taking these medications      acetaminophen 500 MG tablet  Commonly known as: TYLENOL     Auryxia 1  MG(Fe) Tabs tablet  Generic drug: ferric citrate     bisacodyl 5 MG EC tablet  Commonly known as: DULCOLAX     epoetin derek-epbx 49617 UNIT/ML Soln injection  Commonly known as: RETACRIT  Inject 1 mL into the skin three times a week     escitalopram 10 MG tablet  Commonly known as: LEXAPRO     gabapentin 300 MG capsule  Commonly known as: NEURONTIN     glucose 4 g chewable tablet  Take 4 tablets by mouth as needed for Low blood sugar     insulin glargine 100 UNIT/ML injection vial  Commonly known as: LANTUS  Inject 6 Units into the skin nightly     magnesium hydroxide 400 MG/5ML suspension  Commonly known as: MILK OF MAGNESIA     metoprolol succinate 25 MG extended release tablet  Commonly known as: TOPROL XL  Take 1 tablet by mouth daily     midodrine 5 MG tablet  Commonly known as: PROAMATINE     polyethylene glycol 17 g packet  Commonly known as: GLYCOLAX     prochlorperazine 5 MG tablet  Commonly known as: COMPAZINE  Take 1 tablet by mouth every 6 hours as needed for Nausea     senna 8.6 MG tablet  Commonly known as: SENOKOT     traZODone 50 MG tablet  Commonly known as: DESYREL     vitamin D 50 MCG (2000 UT) Tabs tablet  Commonly known as: CHOLECALCIFEROL  Take 1 tablet by mouth daily            STOP taking these medications      lidocaine 4 % cream  Commonly known as: LMX            ASK your doctor about these medications      oxyCODONE-acetaminophen 5-325 MG per tablet  Commonly known as: Percocet  Take 1 tablet by mouth every 6 hours as needed for Pain for up to 7 days. Ask about: Should I take this medication?                Where to Get Your Medications        You can get these medications from any pharmacy    Bring a paper prescription for each of these medications  oxyCODONE-acetaminophen 5-325 MG per tablet       Information about where to get these medications is not yet available    Ask your nurse or doctor about these medications  busPIRone 10 MG tablet  heparin (porcine) 40381 UNIT/ML injection  insulin lispro 100 UNIT/ML Soln injection vial  insulin lispro 100 UNIT/ML Soln injection vial  prochlorperazine 5 MG tablet          Hospital Course/Procedures:67year-old on hemodialysis for chronic kidney disease was admitted on 11/3/2022 after falling at home and sustaining a distal RIGHT femur fracture and a LEFT ulnar and radius fracture. Patient was admitted. She was given pain medication. Dialysis was continued. Also general surgery was consulted for wound care for a chronic sacral decubitus that was present on admission. Patient underwent open reduction and internal fixation of both her RIGHT distal femur fracture and her LEFT wrist fracture on 11/4/2022. Patient was fitted with a hinged knee brace. Patient was discharged in stable condition to a skilled nursing facility on 11/10/2022.     Consultants Following:orthopedics, general surgery and nephrology    Disposition:skilled nursing facility    Follow-up:she'll be followed by the facility physician and orthopedic surgery      Helene Hendricks MD  12/22/2022  1:33 PM

## 2022-12-23 LAB
ALBUMIN SERPL-MCNC: 2.9 G/DL (ref 3.5–5.2)
ALP BLD-CCNC: 83 U/L (ref 35–104)
ALT SERPL-CCNC: 7 U/L (ref 0–32)
ANION GAP SERPL CALCULATED.3IONS-SCNC: 11 MMOL/L (ref 7–16)
AST SERPL-CCNC: 14 U/L (ref 0–31)
BILIRUB SERPL-MCNC: 0.3 MG/DL (ref 0–1.2)
BUN BLDV-MCNC: 32 MG/DL (ref 6–23)
C-REACTIVE PROTEIN: 2.1 MG/DL (ref 0–0.4)
CALCIUM SERPL-MCNC: 10 MG/DL (ref 8.6–10.2)
CHLORIDE BLD-SCNC: 98 MMOL/L (ref 98–107)
CO2: 31 MMOL/L (ref 22–29)
CREAT SERPL-MCNC: 5 MG/DL (ref 0.5–1)
GFR SERPL CREATININE-BSD FRML MDRD: 9 ML/MIN/1.73
GLUCOSE BLD-MCNC: 60 MG/DL (ref 74–99)
HCT VFR BLD CALC: 31 % (ref 34–48)
HEMOGLOBIN: 9.6 G/DL (ref 11.5–15.5)
MCH RBC QN AUTO: 34.9 PG (ref 26–35)
MCHC RBC AUTO-ENTMCNC: 31 % (ref 32–34.5)
MCV RBC AUTO: 112.7 FL (ref 80–99.9)
PDW BLD-RTO: 17.2 FL (ref 11.5–15)
PLATELET # BLD: 164 E9/L (ref 130–450)
PMV BLD AUTO: 10.7 FL (ref 7–12)
POTASSIUM SERPL-SCNC: 4.2 MMOL/L (ref 3.5–5)
RBC # BLD: 2.75 E12/L (ref 3.5–5.5)
SEDIMENTATION RATE, ERYTHROCYTE: 98 MM/HR (ref 0–20)
SODIUM BLD-SCNC: 140 MMOL/L (ref 132–146)
TOTAL PROTEIN: 5.9 G/DL (ref 6.4–8.3)
WBC # BLD: 4.8 E9/L (ref 4.5–11.5)

## 2022-12-24 LAB
ANAEROBIC CULTURE: NORMAL
WOUND/ABSCESS: NORMAL

## 2022-12-28 ENCOUNTER — HOSPITAL ENCOUNTER (EMERGENCY)
Age: 72
Discharge: HOME | End: 2022-12-28
Attending: EMERGENCY MEDICINE
Payer: MEDICARE

## 2022-12-28 VITALS
RESPIRATION RATE: 18 BRPM | BODY MASS INDEX: 35.87 KG/M2 | HEIGHT: 61 IN | OXYGEN SATURATION: 100 % | WEIGHT: 190 LBS | HEART RATE: 64 BPM | TEMPERATURE: 98.1 F | SYSTOLIC BLOOD PRESSURE: 120 MMHG | DIASTOLIC BLOOD PRESSURE: 48 MMHG

## 2022-12-28 DIAGNOSIS — Z51.89 VISIT FOR WOUND CHECK: Primary | ICD-10-CM

## 2022-12-28 LAB
HCT VFR BLD CALC: 30.1 % (ref 34–48)
HEMOGLOBIN: 9.1 G/DL (ref 11.5–15.5)

## 2022-12-28 PROCEDURE — 99283 EMERGENCY DEPT VISIT LOW MDM: CPT

## 2022-12-28 ASSESSMENT — ENCOUNTER SYMPTOMS
VOMITING: 0
SORE THROAT: 0
WHEEZING: 0
COUGH: 0
EYE REDNESS: 0
EYE DISCHARGE: 0
ABDOMINAL DISTENTION: 0
SINUS PRESSURE: 0
EYE PAIN: 0
NAUSEA: 0
DIARRHEA: 0
BACK PAIN: 0
SHORTNESS OF BREATH: 0

## 2022-12-28 ASSESSMENT — LIFESTYLE VARIABLES: HOW OFTEN DO YOU HAVE A DRINK CONTAINING ALCOHOL: NEVER

## 2022-12-28 NOTE — ED NOTES
Wounds irrigated x3, tolerated procedure well. Wet to dry dressing applied to right knee.       Jayne Arnett RN  12/28/22 2091

## 2022-12-28 NOTE — CARE COORDINATION
Social Work/Transition of Care:     Pt returning to [employer] and in need of transportation, SW called [facility], pt and ED RN notified.     Electronically signed by Agatha Daly on 61/16/0573 at 3:11 PM

## 2022-12-28 NOTE — ED PROVIDER NOTES
Patient with history of femur fracture, treated by Dr Indu Shaikh. She is currently in a nursing home and utilizes a brace. She has several areas of wounds to the knee. She presents today requesting that they be evaluated. She has no specific concern of the wounds. She is concerned about the care she is receiving at the local nursing facility. She reports some of the nurses refused to change the dressing on the wounds. The history is provided by the patient. Knee Problem  Location:  Knee  Injury: yes    Mechanism of injury: fall    Pain details:     Severity:  No pain  Relieved by:  Nothing  Worsened by:  Nothing  Ineffective treatments:  None tried  Associated symptoms: no back pain, no fever, no muscle weakness, no numbness, no stiffness, no swelling and no tingling       Review of Systems   Constitutional:  Negative for chills and fever. HENT:  Negative for ear pain, sinus pressure and sore throat. Eyes:  Negative for pain, discharge and redness. Respiratory:  Negative for cough, shortness of breath and wheezing. Cardiovascular:  Negative for chest pain. Gastrointestinal:  Negative for abdominal distention, diarrhea, nausea and vomiting. Genitourinary:  Negative for dysuria and frequency. Musculoskeletal:  Negative for arthralgias, back pain and stiffness. Skin:  Positive for wound. Negative for rash. Neurological:  Negative for weakness and headaches. Hematological:  Negative for adenopathy. All other systems reviewed and are negative. Physical Exam  Vitals and nursing note reviewed. Constitutional:       Appearance: She is well-developed. HENT:      Head: Normocephalic and atraumatic. Eyes:      Pupils: Pupils are equal, round, and reactive to light. Cardiovascular:      Rate and Rhythm: Normal rate and regular rhythm. Heart sounds: Normal heart sounds. No murmur heard. Pulmonary:      Effort: Pulmonary effort is normal. No respiratory distress.       Breath sounds: Normal breath sounds. No wheezing or rales. Abdominal:      General: Bowel sounds are normal.      Palpations: Abdomen is soft. Tenderness: There is no abdominal tenderness. There is no guarding or rebound. Musculoskeletal:      Cervical back: Normal range of motion and neck supple. Skin:     General: Skin is warm and dry. Comments: 3 open wounds with packing noted to the right knee. No signs of abscess or infection. Appears to be well-healing. Neurological:      Mental Status: She is alert and oriented to person, place, and time. Cranial Nerves: No cranial nerve deficit. Coordination: Coordination normal.        Procedures     MDM     Informed consent. The patient has given verbal consent to have photos taken of their wound(s) and inserted into their ED provider note as part of their permanent medical record. The patient did view the photo  and deemed it appropriate prior to inclusion in their chart.          --------------------------------------------- PAST HISTORY ---------------------------------------------  Past Medical History:  has a past medical history of Anemia in chronic kidney disease, Anxiety and depression, Arthritis, Chronic pain syndrome, COVID-19, COVID-19, Decreased dorsalis pedis pulse, Diabetes mellitus ([address] Dysphagia, oral phase, ESRD on hemodialysis ([address] GERD (gastroesophageal reflux disease), Hemodialysis patient ([address] Hyperkalemia, Hypertension, Hypertensive kidney disease with stage 4 chronic kidney disease ([address] Insomnia, Kidney stones, MDD (major depressive disorder), Moderate protein-calorie malnutrition ([address] Morbid obesity ([address] Muscle weakness (generalized), Obesity, Pressure injury of sacral region, stage 3 ([address] Pressure ulcer of sacral region, Sacral pressure ulcer, Unspecified osteoarthritis, unspecified site, and Weakness generalized.     Past Surgical History:  has a past surgical history that includes Foot surgery (2009 ); Cystocopy (2011 );  section (x3); Upper gastrointestinal endoscopy (2.18.15); Cholecystectomy (3/31/16); Abdomen surgery (N/A, 2020); Upper gastrointestinal endoscopy (N/A, 2020); Upper gastrointestinal endoscopy (N/A, 2020); vascular surgery (N/A, 2021); Dialysis fistula creation (Left, 2021); Dialysis fistula creation (Right, 2021); Femur fracture surgery (Right, 2022); and Wrist fracture surgery (Left, 2022). Social History:  reports that she quit smoking about 11 years ago. She has a 30.00 pack-year smoking history. She has never used smokeless tobacco. She reports that she does not drink alcohol and does not use drugs. Family History: family history includes Cancer in her sister. The patients home medications have been reviewed. Allergies: Patient has no known allergies. -------------------------------------------------- RESULTS -------------------------------------------------  Labs:  No results found for this visit on 22. Radiology:  No orders to display       ------------------------- NURSING NOTES AND VITALS REVIEWED ---------------------------  Date / Time Roomed:  2022 12:03 PM  ED Bed Assignment:      The nursing notes within the ED encounter and vital signs as below have been reviewed. There were no vitals taken for this visit. Oxygen Saturation Interpretation: Normal      ------------------------------------------ PROGRESS NOTES ------------------------------------------  12:29 PM EST  I have spoken with the patient and discussed todays results, in addition to providing specific details for the plan of care and counseling regarding the diagnosis and prognosis. Their questions are answered at this time and they are agreeable with the plan. I discussed at length with them reasons for immediate return here for re evaluation.  They will followup with their primary care physician by calling their office tomorrow. --------------------------------- ADDITIONAL PROVIDER NOTES ---------------------------------  At this time the patient is without objective evidence of an acute process requiring hospitalization or inpatient management. They have remained hemodynamically stable throughout their entire ED visit and are stable for discharge with outpatient follow-up. The plan has been discussed in detail and they are aware of the specific conditions for emergent return, as well as the importance of follow-up. New Prescriptions    No medications on file       Diagnosis:  1. Visit for wound check        Disposition:  Patient's disposition: Discharge to nursing home  Patient's condition is stable.        Manjula Lott, DO  12/28/22 [address] Amberly Llanes, DO  12/28/22 1247

## 2022-12-29 LAB
ALBUMIN SERPL-MCNC: 2.3 G/DL (ref 3.5–5.2)
ALP BLD-CCNC: 84 U/L (ref 35–104)
ALT SERPL-CCNC: 7 U/L (ref 0–32)
ANION GAP SERPL CALCULATED.3IONS-SCNC: 14 MMOL/L (ref 7–16)
AST SERPL-CCNC: 26 U/L (ref 0–31)
BILIRUB SERPL-MCNC: 0.3 MG/DL (ref 0–1.2)
BUN BLDV-MCNC: 37 MG/DL (ref 6–23)
C-REACTIVE PROTEIN: 2.2 MG/DL (ref 0–0.4)
CALCIUM SERPL-MCNC: 10 MG/DL (ref 8.6–10.2)
CHLORIDE BLD-SCNC: 103 MMOL/L (ref 98–107)
CO2: 25 MMOL/L (ref 22–29)
CREAT SERPL-MCNC: 5.7 MG/DL (ref 0.5–1)
GFR SERPL CREATININE-BSD FRML MDRD: 7 ML/MIN/1.73
GLUCOSE BLD-MCNC: 88 MG/DL (ref 74–99)
HCT VFR BLD CALC: 30.5 % (ref 34–48)
HEMOGLOBIN: 9.6 G/DL (ref 11.5–15.5)
MCH RBC QN AUTO: 34.9 PG (ref 26–35)
MCHC RBC AUTO-ENTMCNC: 31.5 % (ref 32–34.5)
MCV RBC AUTO: 110.9 FL (ref 80–99.9)
PDW BLD-RTO: 18.1 FL (ref 11.5–15)
PLATELET # BLD: 140 E9/L (ref 130–450)
PMV BLD AUTO: 10.9 FL (ref 7–12)
POTASSIUM SERPL-SCNC: 5.9 MMOL/L (ref 3.5–5)
RBC # BLD: 2.75 E12/L (ref 3.5–5.5)
SODIUM BLD-SCNC: 142 MMOL/L (ref 132–146)
TOTAL PROTEIN: 6.2 G/DL (ref 6.4–8.3)
WBC # BLD: 5.4 E9/L (ref 4.5–11.5)

## 2022-12-30 LAB
ANION GAP SERPL CALCULATED.3IONS-SCNC: 11 MMOL/L (ref 7–16)
BUN BLDV-MCNC: 30 MG/DL (ref 6–23)
CALCIUM SERPL-MCNC: 9.5 MG/DL (ref 8.6–10.2)
CHLORIDE BLD-SCNC: 102 MMOL/L (ref 98–107)
CO2: 31 MMOL/L (ref 22–29)
CREAT SERPL-MCNC: 4.5 MG/DL (ref 0.5–1)
GFR SERPL CREATININE-BSD FRML MDRD: 10 ML/MIN/1.73
GLUCOSE BLD-MCNC: 73 MG/DL (ref 74–99)
POTASSIUM SERPL-SCNC: 4.7 MMOL/L (ref 3.5–5)
SEDIMENTATION RATE, ERYTHROCYTE: 62 MM/HR (ref 0–20)
SODIUM BLD-SCNC: 144 MMOL/L (ref 132–146)

## 2023-01-03 ENCOUNTER — OFFICE VISIT (OUTPATIENT)
Dept: ORTHOPEDIC SURGERY | Age: 73
End: 2023-01-03

## 2023-01-03 VITALS — HEIGHT: 61 IN | BODY MASS INDEX: 35.87 KG/M2 | WEIGHT: 190 LBS

## 2023-01-03 DIAGNOSIS — S72.8X1A OTHER FRACTURE OF RIGHT FEMUR, INITIAL ENCOUNTER FOR CLOSED FRACTURE (HCC): Primary | ICD-10-CM

## 2023-01-03 LAB
HCT VFR BLD CALC: 30.2 % (ref 34–48)
HEMOGLOBIN: 9.6 G/DL (ref 11.5–15.5)

## 2023-01-03 PROCEDURE — 99024 POSTOP FOLLOW-UP VISIT: CPT | Performed by: ORTHOPAEDIC SURGERY

## 2023-01-03 RX ORDER — DOXYCYCLINE HYCLATE 100 MG
100 TABLET ORAL 2 TIMES DAILY
Qty: 42 TABLET | Refills: 0 | Status: SHIPPED | OUTPATIENT
Start: 2023-01-03 | End: 2023-01-24

## 2023-01-03 RX ORDER — DOXYCYCLINE HYCLATE 100 MG
100 TABLET ORAL 2 TIMES DAILY
Qty: 42 TABLET | Refills: 0 | Status: SHIPPED
Start: 2023-01-03 | End: 2023-01-03 | Stop reason: SDUPTHER

## 2023-01-03 RX ORDER — ONDANSETRON 4 MG/1
4 TABLET, FILM COATED ORAL EVERY 12 HOURS PRN
COMMUNITY

## 2023-01-03 RX ORDER — MULTIVIT WITH MINERALS/LUTEIN
250 TABLET ORAL DAILY
COMMUNITY

## 2023-01-03 NOTE — PROGRESS NOTES
HPI: Patient presents today 8.5 weeks s/p left distal radius ORIF and right distal femur ORIF. Patient presents for wound check to her right lower extremity. She did go to the hospital on December 28. The wound care nurse sent her to the hospital for further evaluation. She was sent home and instructed to continue daily dressing changes. At her last office visit she did have mixed zaida including gram-positive organisms. She did not have an elevated white blood cell count. She did have an elevated sed rate and CRP. She states she has been taking her doxycycline and daily dressing changes. Physical Exam:     Right lower extremity: lateral femur incision with 3 open wounds with yellow exudate. These measure 2.5 x 1 cm, 1.5 x 1 cm, and 2.5 x 1.5 cm from medial to lateral. This does not appear to be tunneling into the hardware or knee joint. Minimal surrounding hyperemia. There is also a wound of her anterior knee measuring 1.1 cm. Remaining incisions healing well. Negative tenderness about the kneeIntact PF/DF of the ankle. Brisk capillary refill. Otherwise NVI. WBC:5.4  CRP:2.2  ESR: 62        Assessment: 8.5 weeks s/p left distal radius ORIF and right distal femur ORIF    Plan:   Discussed that this does not appear to extend into the knee joint or down into her hardware. Discussed surgical management versus conservative management. We will plan to continue with conservative management at this time. Patient to continue doxycycline. Patient can be weightbearing as tolerated to the left upper extremity. Patient to remain nonweightbearing to the right lower extremity. New dressing applied to right lower extremity wound. Continue wound care through her wound care nurse. Follow up in 2-3 weeks for a wound check and repeat xrays. She will obtain xrays prior to coming to the office. She will continue to monitor for worsening of symptoms or fever or chills and contact the office with concerns. All questions and concerns answered. I have seen and evaluated the patient and agree with the above assessment and plan on today's visit. I have performed the key components of the history and physical examination with significant findings of pressure sores right knee. She did have blood work completed. White count within normal limits. CRP mildly elevated. Sed rate 62. I did explain these findings with her. She has no overt findings of gross infection particular of the underlying hardware. We will continue with local wound care with the dressing changes. Discussed possible need for surgical intervention with debridement and closure. . I concur with the findings and plan as documented.     Mary Obrien MD  1/3/2023

## 2023-01-04 LAB
HCT VFR BLD CALC: 31.8 % (ref 34–48)
HEMOGLOBIN: 10 G/DL (ref 11.5–15.5)

## 2023-01-05 NOTE — DISCHARGE SUMMARY
Admit Date: 11/25/2022 12:54 PM   Discharge Date: 11/30/2022    Patient Active Problem List   Diagnosis    Neurologic gait dysfunction    Diabetes mellitus ([facility] [address]    Hypertension    Arthritis    Knee problem    Anemia due to stage 3 chronic kidney disease    Primary osteoarthritis of both knees    Moderate protein-calorie malnutrition (HCC)    Pressure injury of sacral region, stage 4 (HCC)    Pressure injury of right hip, stage 3 (HCC)    Left bundle branch block    Moderate obesity    Failure to thrive in adult    Pressure injury of sacral region, stage 3 (HCC)    Decreased dorsalis pedis pulse    ESRD on hemodialysis ([address]    Closed bicondylar fracture of distal femur, right, initial encounter ([facility] [address]    Other fracture of right femur, initial encounter for closed fracture ([facility] [address]    Closed fracture of distal ends of left radius and ulna    Closed bicondylar fracture of distal end of right femur ([address]    Metabolic encephalopathy        Present on Admission:   (Resolved) Complicated UTI (urinary tract infection)   Metabolic encephalopathy   Pressure injury of sacral region, stage 3 ([address]   ESRD on hemodialysis ([facility] [address]          Medication List        CONTINUE taking these medications      acetaminophen 500 MG tablet  Commonly known as: TYLENOL     Auryxia 1  MG(Fe) Tabs tablet  Generic drug: ferric citrate     bisacodyl 5 MG EC tablet  Commonly known as: DULCOLAX     busPIRone 10 MG tablet  Commonly known as: BUSPAR  Take 1 tablet by mouth 2 times daily     epoetin derek-epbx 93870 UNIT/ML Soln injection  Commonly known as: RETACRIT  Inject 1 mL into the skin three times a week     escitalopram 10 MG tablet  Commonly known as: LEXAPRO     gabapentin 300 MG capsule  Commonly known as: NEURONTIN     glucose 4 g chewable tablet  Take 4 tablets by mouth as needed for Low blood sugar     heparin (porcine) 91214 UNIT/ML injection  Inject 0.5 mLs into the skin every 8 hours     insulin glargine 100 UNIT/ML injection vial  Commonly known as: LANTUS  Inject 6 Units into the skin nightly     * insulin lispro 100 UNIT/ML Soln injection vial  Commonly known as: HUMALOG  Inject 0-8 Units into the skin 3 times daily (with meals)     * insulin lispro 100 UNIT/ML Soln injection vial  Commonly known as: HUMALOG  Inject 0-4 Units into the skin nightly     magnesium hydroxide 400 MG/5ML suspension  Commonly known as: MILK OF MAGNESIA     metoprolol succinate 25 MG extended release tablet  Commonly known as: TOPROL XL  Take 1 tablet by mouth daily     midodrine 5 MG tablet  Commonly known as: PROAMATINE     polyethylene glycol 17 g packet  Commonly known as: GLYCOLAX     prochlorperazine 5 MG tablet  Commonly known as: COMPAZINE  Take 1 tablet by mouth every 6 hours as needed for Nausea     senna 8.6 MG tablet  Commonly known as: SENOKOT     traZODone 50 MG tablet  Commonly known as: DESYREL     vitamin D 50 MCG (2000 UT) Tabs tablet  Commonly known as: CHOLECALCIFEROL  Take 1 tablet by mouth daily           * This list has 2 medication(s) that are the same as other medications prescribed for you. Read the directions carefully, and ask your doctor or other care provider to review them with you. ASK your doctor about these medications      benzonatate 100 MG capsule  Commonly known as: TESSALON  Take 1 capsule by mouth 3 times daily as needed for Cough  Ask about: Should I take this medication? Where to Get Your Medications        Information about where to get these medications is not yet available    Ask your nurse or doctor about these medications  benzonatate 100 MG capsule          Hospital Course/Procedures:67year-old admitted on 11/25/2022 for acute confusion. Patient was admitted. Per my exam she appeared to be alert and oriented x3. Question regarding possible UTI. ID was consulted and they believed that patient did not have an active UTI and recommended no antibiotics.   Discharged back to [facility] nursing facility was delayed as usual by the need for precertification. Also screening COVID test was positive however respiratory viral panel/PCR was negative for COVID. Patient was eventually discharged back to skilled nursing facility in stable condition on 11/30/2022.     Consultants Following:infectious disease    Disposition:skilled nursing facility    Follow-up:she'll be followed by the facility physician      Bharat Lawrence MD  1/5/2023  1:04 PM

## 2023-01-06 LAB
HCT VFR BLD CALC: 32.2 % (ref 34–48)
HEMOGLOBIN: 10.3 G/DL (ref 11.5–15.5)

## 2023-01-11 LAB
ALBUMIN SERPL-MCNC: 2.9 G/DL (ref 3.5–5.2)
ALP BLD-CCNC: 79 U/L (ref 35–104)
ALT SERPL-CCNC: 7 U/L (ref 0–32)
ANION GAP SERPL CALCULATED.3IONS-SCNC: 8 MMOL/L (ref 7–16)
AST SERPL-CCNC: 13 U/L (ref 0–31)
BILIRUB SERPL-MCNC: 0.2 MG/DL (ref 0–1.2)
BUN BLDV-MCNC: 28 MG/DL (ref 6–23)
CALCIUM SERPL-MCNC: 9.7 MG/DL (ref 8.6–10.2)
CHLORIDE BLD-SCNC: 102 MMOL/L (ref 98–107)
CO2: 31 MMOL/L (ref 22–29)
CREAT SERPL-MCNC: 5 MG/DL (ref 0.5–1)
GFR SERPL CREATININE-BSD FRML MDRD: 9 ML/MIN/1.73
GLUCOSE BLD-MCNC: 62 MG/DL (ref 74–99)
HCT VFR BLD CALC: 33.6 % (ref 34–48)
HEMOGLOBIN: 10.5 G/DL (ref 11.5–15.5)
MCH RBC QN AUTO: 36 PG (ref 26–35)
MCHC RBC AUTO-ENTMCNC: 31.3 % (ref 32–34.5)
MCV RBC AUTO: 115.1 FL (ref 80–99.9)
PDW BLD-RTO: 18.3 FL (ref 11.5–15)
PLATELET # BLD: 183 E9/L (ref 130–450)
PMV BLD AUTO: 10.7 FL (ref 7–12)
POTASSIUM SERPL-SCNC: 4.2 MMOL/L (ref 3.5–5)
RBC # BLD: 2.92 E12/L (ref 3.5–5.5)
SODIUM BLD-SCNC: 141 MMOL/L (ref 132–146)
TOTAL PROTEIN: 6 G/DL (ref 6.4–8.3)
WBC # BLD: 5.4 E9/L (ref 4.5–11.5)

## 2023-01-13 LAB
C-REACTIVE PROTEIN: 1.1 MG/DL (ref 0–0.4)
HCT VFR BLD CALC: 32.3 % (ref 34–48)
HEMOGLOBIN: 10 G/DL (ref 11.5–15.5)
MCH RBC QN AUTO: 35.1 PG (ref 26–35)
MCHC RBC AUTO-ENTMCNC: 31 % (ref 32–34.5)
MCV RBC AUTO: 113.3 FL (ref 80–99.9)
PDW BLD-RTO: 18.6 FL (ref 11.5–15)
PLATELET # BLD: 184 E9/L (ref 130–450)
PMV BLD AUTO: 11 FL (ref 7–12)
RBC # BLD: 2.85 E12/L (ref 3.5–5.5)
WBC # BLD: 5.3 E9/L (ref 4.5–11.5)

## 2023-01-16 LAB
HCT VFR BLD CALC: 35.2 % (ref 34–48)
HEMOGLOBIN: 10.8 G/DL (ref 11.5–15.5)
SEDIMENTATION RATE, ERYTHROCYTE: 45 MM/HR (ref 0–20)

## 2023-01-18 LAB
HCT VFR BLD CALC: 33.6 % (ref 34–48)
HEMOGLOBIN: 10.6 G/DL (ref 11.5–15.5)

## 2023-01-20 LAB
HCT VFR BLD CALC: 37.2 % (ref 34–48)
HEMOGLOBIN: 11.8 G/DL (ref 11.5–15.5)

## 2023-01-24 ENCOUNTER — OFFICE VISIT (OUTPATIENT)
Dept: ORTHOPEDIC SURGERY | Age: 73
End: 2023-01-24

## 2023-01-24 ENCOUNTER — OUTSIDE SERVICES (OUTPATIENT)
Dept: PRIMARY CARE CLINIC | Age: 73
End: 2023-01-24
Payer: MEDICARE

## 2023-01-24 VITALS — WEIGHT: 190 LBS | BODY MASS INDEX: 35.87 KG/M2 | HEIGHT: 61 IN

## 2023-01-24 DIAGNOSIS — E11.65 TYPE 2 DIABETES MELLITUS WITH HYPERGLYCEMIA, WITH LONG-TERM CURRENT USE OF INSULIN (HCC): Primary | ICD-10-CM

## 2023-01-24 DIAGNOSIS — E44.0 MODERATE PROTEIN-CALORIE MALNUTRITION (HCC): ICD-10-CM

## 2023-01-24 DIAGNOSIS — N18.9 ANEMIA IN CHRONIC KIDNEY DISEASE, UNSPECIFIED CKD STAGE: ICD-10-CM

## 2023-01-24 DIAGNOSIS — D63.1 ANEMIA IN CHRONIC KIDNEY DISEASE, UNSPECIFIED CKD STAGE: ICD-10-CM

## 2023-01-24 DIAGNOSIS — I10 HYPERTENSION, UNSPECIFIED TYPE: ICD-10-CM

## 2023-01-24 DIAGNOSIS — R62.7 FAILURE TO THRIVE IN ADULT: ICD-10-CM

## 2023-01-24 DIAGNOSIS — Z79.4 TYPE 2 DIABETES MELLITUS WITH HYPERGLYCEMIA, WITH LONG-TERM CURRENT USE OF INSULIN (HCC): Primary | ICD-10-CM

## 2023-01-24 DIAGNOSIS — N18.6 ESRD ON HEMODIALYSIS (HCC): ICD-10-CM

## 2023-01-24 DIAGNOSIS — S72.8X1A OTHER FRACTURE OF RIGHT FEMUR, INITIAL ENCOUNTER FOR CLOSED FRACTURE (HCC): Primary | ICD-10-CM

## 2023-01-24 DIAGNOSIS — M17.0 PRIMARY OSTEOARTHRITIS OF BOTH KNEES: ICD-10-CM

## 2023-01-24 DIAGNOSIS — N18.30 ANEMIA DUE TO STAGE 3 CHRONIC KIDNEY DISEASE, UNSPECIFIED WHETHER STAGE 3A OR 3B CKD (HCC): ICD-10-CM

## 2023-01-24 DIAGNOSIS — F41.1 GENERALIZED ANXIETY DISORDER: ICD-10-CM

## 2023-01-24 DIAGNOSIS — Z99.2 ESRD ON HEMODIALYSIS (HCC): ICD-10-CM

## 2023-01-24 DIAGNOSIS — D63.1 ANEMIA DUE TO STAGE 3 CHRONIC KIDNEY DISEASE, UNSPECIFIED WHETHER STAGE 3A OR 3B CKD (HCC): ICD-10-CM

## 2023-01-24 DIAGNOSIS — N18.6 END STAGE RENAL DISEASE (HCC): ICD-10-CM

## 2023-01-24 PROCEDURE — 99309 SBSQ NF CARE MODERATE MDM 30: CPT

## 2023-01-24 PROCEDURE — 99024 POSTOP FOLLOW-UP VISIT: CPT | Performed by: ORTHOPAEDIC SURGERY

## 2023-01-24 NOTE — PROGRESS NOTES
10 weeks postop right distal femur open reduction fixation as well as left distal radius open duction fixation. Postoperative course of been complicated with wound breakdown secondary to the brace on the right lower extremity. She is been using her wrist since her last visit without any difficulties. They have been doing local wound care to the distal femur. Physical exam: Left wrist nontender with full range of motion neurovascular intact. Right lower extremity: No signs of infection. 3 areas of secondary healing over the lateral aspect of the knee. There is a central area anterior over the tubercle with secondary healing. No gross findings of infection. Areas were probed with a sterile Q-tip without any tunneling and no exposed hardware. She is otherwise neurovascular intact. X-rays reviewed over the Internet of the distal femur demonstrating a stable osseous alignment with intramedullary brien fixation of the distal femur. X-rays of the left wrist demonstrate stable plate and screw fixation and healed distal radius fracture    Assessment 10 weeks out distal femur ORIF with complication of wound breakdown secondary to bracing. Healed left distal radius    Plan    Will continue with secondary wound healing with daily dressing changes and wound care. Follow-up in a month for reevaluation.   New x-rays of the femur at next visit

## 2023-01-25 LAB
REASON FOR REJECTION: NORMAL
REJECTED TEST: NORMAL

## 2023-01-26 LAB
ALBUMIN SERPL-MCNC: 2.8 G/DL (ref 3.5–5.2)
ALP BLD-CCNC: 66 U/L (ref 35–104)
ALT SERPL-CCNC: 5 U/L (ref 0–32)
AMMONIA: 16 UMOL/L (ref 11–51)
ANION GAP SERPL CALCULATED.3IONS-SCNC: 10 MMOL/L (ref 7–16)
AST SERPL-CCNC: 12 U/L (ref 0–31)
BACTERIA: ABNORMAL /HPF
BILIRUB SERPL-MCNC: 0.2 MG/DL (ref 0–1.2)
BILIRUBIN URINE: NEGATIVE
BLOOD, URINE: ABNORMAL
BUN BLDV-MCNC: 19 MG/DL (ref 6–23)
CALCIUM SERPL-MCNC: 9.7 MG/DL (ref 8.6–10.2)
CHLORIDE BLD-SCNC: 107 MMOL/L (ref 98–107)
CLARITY: ABNORMAL
CO2: 24 MMOL/L (ref 22–29)
COLOR: YELLOW
CREAT SERPL-MCNC: 3.7 MG/DL (ref 0.5–1)
GFR SERPL CREATININE-BSD FRML MDRD: 12 ML/MIN/1.73
GLUCOSE BLD-MCNC: 162 MG/DL (ref 74–99)
GLUCOSE URINE: NEGATIVE MG/DL
HCT VFR BLD CALC: 35.4 % (ref 34–48)
HCT VFR BLD CALC: 35.5 % (ref 34–48)
HEMOGLOBIN: 10.8 G/DL (ref 11.5–15.5)
HEMOGLOBIN: 10.9 G/DL (ref 11.5–15.5)
KETONES, URINE: NEGATIVE MG/DL
LEUKOCYTE ESTERASE, URINE: ABNORMAL
MCH RBC QN AUTO: 35.3 PG (ref 26–35)
MCHC RBC AUTO-ENTMCNC: 30.4 % (ref 32–34.5)
MCV RBC AUTO: 116 FL (ref 80–99.9)
NITRITE, URINE: NEGATIVE
PDW BLD-RTO: 16.5 FL (ref 11.5–15)
PH UA: 7.5 (ref 5–9)
PLATELET # BLD: 150 E9/L (ref 130–450)
PMV BLD AUTO: 10.9 FL (ref 7–12)
POTASSIUM SERPL-SCNC: 4.9 MMOL/L (ref 3.5–5)
PROTEIN UA: 100 MG/DL
RBC # BLD: 3.06 E12/L (ref 3.5–5.5)
RBC UA: ABNORMAL /HPF (ref 0–2)
SODIUM BLD-SCNC: 141 MMOL/L (ref 132–146)
SPECIFIC GRAVITY UA: 1.02 (ref 1–1.03)
TOTAL PROTEIN: 5.8 G/DL (ref 6.4–8.3)
UROBILINOGEN, URINE: 0.2 E.U./DL
WBC # BLD: 11 E9/L (ref 4.5–11.5)
WBC UA: ABNORMAL /HPF (ref 0–5)

## 2023-01-29 LAB
ORGANISM: ABNORMAL
URINE CULTURE, ROUTINE: ABNORMAL

## 2023-01-31 LAB
BASOPHILS ABSOLUTE: 0.05 E9/L (ref 0–0.2)
BASOPHILS RELATIVE PERCENT: 0.7 % (ref 0–2)
EOSINOPHILS ABSOLUTE: 0.17 E9/L (ref 0.05–0.5)
EOSINOPHILS RELATIVE PERCENT: 2.4 % (ref 0–6)
HCT VFR BLD CALC: 34.6 % (ref 34–48)
HEMOGLOBIN: 10.8 G/DL (ref 11.5–15.5)
IMMATURE GRANULOCYTES #: 0.07 E9/L
IMMATURE GRANULOCYTES %: 1 % (ref 0–5)
LYMPHOCYTES ABSOLUTE: 1.09 E9/L (ref 1.5–4)
LYMPHOCYTES RELATIVE PERCENT: 15.2 % (ref 20–42)
MCH RBC QN AUTO: 34.1 PG (ref 26–35)
MCHC RBC AUTO-ENTMCNC: 31.2 % (ref 32–34.5)
MCV RBC AUTO: 109.1 FL (ref 80–99.9)
MONOCYTES ABSOLUTE: 0.57 E9/L (ref 0.1–0.95)
MONOCYTES RELATIVE PERCENT: 7.9 % (ref 2–12)
NEUTROPHILS ABSOLUTE: 5.24 E9/L (ref 1.8–7.3)
NEUTROPHILS RELATIVE PERCENT: 72.8 % (ref 43–80)
PDW BLD-RTO: 15.5 FL (ref 11.5–15)
PLATELET # BLD: 198 E9/L (ref 130–450)
PMV BLD AUTO: 11.1 FL (ref 7–12)
RBC # BLD: 3.17 E12/L (ref 3.5–5.5)
SEDIMENTATION RATE, ERYTHROCYTE: 32 MM/HR (ref 0–20)
WBC # BLD: 7.2 E9/L (ref 4.5–11.5)

## 2023-02-06 ENCOUNTER — TRANSCRIBE ORDERS (OUTPATIENT)
Dept: ADMINISTRATIVE | Age: 73
End: 2023-02-06

## 2023-02-06 DIAGNOSIS — L89.154 STAGE IV PRESSURE ULCER OF SACRAL REGION (HCC): Primary | ICD-10-CM

## 2023-02-06 LAB
ALBUMIN SERPL-MCNC: 3.2 G/DL (ref 3.5–5.2)
ALP BLD-CCNC: 74 U/L (ref 35–104)
ALT SERPL-CCNC: <5 U/L (ref 0–32)
ANION GAP SERPL CALCULATED.3IONS-SCNC: 10 MMOL/L (ref 7–16)
AST SERPL-CCNC: 15 U/L (ref 0–31)
BILIRUB SERPL-MCNC: <0.2 MG/DL (ref 0–1.2)
BUN BLDV-MCNC: 37 MG/DL (ref 6–23)
C-REACTIVE PROTEIN: 1.4 MG/DL (ref 0–0.4)
CALCIUM SERPL-MCNC: 9.8 MG/DL (ref 8.6–10.2)
CHLORIDE BLD-SCNC: 104 MMOL/L (ref 98–107)
CO2: 28 MMOL/L (ref 22–29)
CREAT SERPL-MCNC: 7.1 MG/DL (ref 0.5–1)
GFR SERPL CREATININE-BSD FRML MDRD: 6 ML/MIN/1.73
GLUCOSE BLD-MCNC: 144 MG/DL (ref 74–99)
HCT VFR BLD CALC: 34.5 % (ref 34–48)
HEMOGLOBIN: 11 G/DL (ref 11.5–15.5)
MCH RBC QN AUTO: 34.4 PG (ref 26–35)
MCHC RBC AUTO-ENTMCNC: 31.9 % (ref 32–34.5)
MCV RBC AUTO: 107.8 FL (ref 80–99.9)
PDW BLD-RTO: 15.2 FL (ref 11.5–15)
PLATELET # BLD: 218 E9/L (ref 130–450)
PMV BLD AUTO: 11 FL (ref 7–12)
POTASSIUM SERPL-SCNC: 4.5 MMOL/L (ref 3.5–5)
RBC # BLD: 3.2 E12/L (ref 3.5–5.5)
SEDIMENTATION RATE, ERYTHROCYTE: 47 MM/HR (ref 0–20)
SODIUM BLD-SCNC: 142 MMOL/L (ref 132–146)
TOTAL PROTEIN: 6.5 G/DL (ref 6.4–8.3)
WBC # BLD: 8.5 E9/L (ref 4.5–11.5)

## 2023-02-08 ENCOUNTER — HOSPITAL ENCOUNTER (INPATIENT)
Age: 73
LOS: 2 days | Discharge: NURSING, REHAB OR LONG-TERM CARE | End: 2023-02-11
Attending: EMERGENCY MEDICINE | Admitting: FAMILY MEDICINE
Payer: MEDICARE

## 2023-02-08 DIAGNOSIS — J18.9 PNEUMONIA OF BOTH LOWER LOBES DUE TO INFECTIOUS ORGANISM: ICD-10-CM

## 2023-02-08 DIAGNOSIS — J96.01 ACUTE RESPIRATORY FAILURE WITH HYPOXIA (HCC): Primary | ICD-10-CM

## 2023-02-08 PROCEDURE — 99285 EMERGENCY DEPT VISIT HI MDM: CPT

## 2023-02-08 PROCEDURE — 51701 INSERT BLADDER CATHETER: CPT

## 2023-02-08 PROCEDURE — 93005 ELECTROCARDIOGRAM TRACING: CPT | Performed by: EMERGENCY MEDICINE

## 2023-02-08 NOTE — Clinical Note
Discharge Plan[de-identified] Other/[facility])   Telemetry/Cardiac Monitoring Required?: Yes

## 2023-02-08 NOTE — LETTER
[place] Department Medicaid  CERTIFICATION OF NECESSITY  FOR NON-EMERGENCY TRANSPORTATION   BY GROUND AMBULANCE      Individual Information   1. Name: Allie Lozada 2. [place] Medicaid Billing Number:    3. Address: [address]      Transportation Provider Information   4. Provider Name: CHANEL   5. [place] Medicaid Provider Number:  National Provider Identifier (NPI):      Certification  7. Criteria:  During transport, this individual requires:  [] Medical treatment or continuous     supervision by an EMT. [] The administration or regulation of oxygen by another person. [] Supervised protective restraint. 8. Period Beginning Date: 2/10/2023     9. Length  [] Not more than 1 day(s)  [] One Year     Additional Information Relevant to Certification   10. Comments or Explanations, If Necessary or Appropriate     Acute Respiratory failure with hypoxia,pneumonia coccyx wound     Certifying Practitioner Information   11. Name of Practitioner: Dr Jatinder Morrow   12. [place] Medicaid Provider Number, If Applicable:  [address] Provider Identifier (NPI):      Signature Information   14. Date of Signature: 2/10/2023   15. Name of Person Signing: Electronically signed by Paloma aLnders RN on 2/10/2023 at 1:50 PM     16. Signature and Professional Designation: Electronically signed by Paloma Landers RN on 2/10/2023 at 1:50 PM       OD 83992  Rev. 7/2015          [address] Encounter Date/Time: 2/8/2023 2342    Hospital Account: [de-identified]    MRN: [id number]    Patient: Allie Lozada    Contact Serial #: [id number]      ENCOUNTER          Patient Class: I Private Enc?   No Unit RM BDBuren Hamman [facility] 9118/2378-S   Hospital Service: MED   Encounter DX: Acute respiratory failur*   ADM Provider: Jatinder Morrow MD   Procedure:     ATT Provider: Jatinder Morrow MD   REF Provider:        Admission DX: Acute respiratory failure with hypoxia ([facility] [address] Pneumonia of both lower lobes due to infectious organism, Pneumonia due to infectious organism, unspecified laterality, unspecified part of lung and DX codes: J96.01, J18.9, J18.9      PATIENT                 Name: Leyla Bella : 1950 (72 yrs)   Address: [address] Sex: Female   [place] city: [place] [address]         Marital Status:    Employer: RETIRED         Rastafari: Christianity   Primary Care Provider: Racheal Jaquez MD         Primary Phone: [phone]   EMERGENCY CONTACT   Contact Name Legal Guardian? Relationship to Patient Home Phone Work Phone   1. Johnson Mcguire  2. Inga Mcguire  No Child  Child [phone]              GUARANTOR            Guarantor: Leyla Bella     : 1950   Address: [address] [place] [address] Sex: Female   [facility] [address]     Relation to Patient: Self       Home Phone: [phone]   Guarantor ID: [id number]       Work Phone:     Guarantor Employer: RETIRED         Status: RETIRED      COVERAGE        PRIMARY INSURANCE   Payor: [facility] MEDICARE Plan: Jayme FALCON*   Payor Address: ,          Group Number: [id number] Insurance Type: INDEMNITY   Subscriber Name: Benja Chino : 1950   Subscriber ID: [id number] Pat. Rel. to Sub: Self   SECONDARY INSURANCE   Payor:   Plan:     Payor Address:  ,           Group Number:   Insurance Type:     Subscriber Name:   Subscriber :     Subscriber ID:   Pat.  Rel. to Sub:        CSN: [id number]

## 2023-02-09 ENCOUNTER — APPOINTMENT (OUTPATIENT)
Dept: GENERAL RADIOLOGY | Age: 73
End: 2023-02-09
Payer: MEDICARE

## 2023-02-09 ENCOUNTER — APPOINTMENT (OUTPATIENT)
Dept: CT IMAGING | Age: 73
End: 2023-02-09
Payer: MEDICARE

## 2023-02-09 PROBLEM — J96.01 ACUTE RESPIRATORY FAILURE WITH HYPOXIA (HCC): Status: ACTIVE | Noted: 2023-02-09

## 2023-02-09 PROBLEM — J18.9 PNEUMONIA DUE TO INFECTIOUS ORGANISM, UNSPECIFIED LATERALITY, UNSPECIFIED PART OF LUNG: Status: ACTIVE | Noted: 2023-02-09

## 2023-02-09 LAB
ALBUMIN SERPL-MCNC: 2.9 G/DL (ref 3.5–5.2)
ALP BLD-CCNC: 69 U/L (ref 35–104)
ALT SERPL-CCNC: 9 U/L (ref 0–32)
AMORPHOUS: ABNORMAL
ANION GAP SERPL CALCULATED.3IONS-SCNC: 10 MMOL/L (ref 7–16)
ANISOCYTOSIS: ABNORMAL
AST SERPL-CCNC: 14 U/L (ref 0–31)
BACTERIA: ABNORMAL /HPF
BASOPHILS ABSOLUTE: 0.07 E9/L (ref 0–0.2)
BASOPHILS RELATIVE PERCENT: 0.4 % (ref 0–2)
BILIRUB SERPL-MCNC: 0.2 MG/DL (ref 0–1.2)
BILIRUBIN URINE: NEGATIVE
BLOOD, URINE: ABNORMAL
BUN BLDV-MCNC: 31 MG/DL (ref 6–23)
CALCIUM SERPL-MCNC: 9.9 MG/DL (ref 8.6–10.2)
CHLORIDE BLD-SCNC: 101 MMOL/L (ref 98–107)
CLARITY: ABNORMAL
CO2: 28 MMOL/L (ref 22–29)
COLOR: YELLOW
CREAT SERPL-MCNC: 5.4 MG/DL (ref 0.5–1)
EOSINOPHILS ABSOLUTE: 0.1 E9/L (ref 0.05–0.5)
EOSINOPHILS RELATIVE PERCENT: 0.6 % (ref 0–6)
EPITHELIAL CELLS, UA: ABNORMAL /HPF
GFR SERPL CREATININE-BSD FRML MDRD: 8 ML/MIN/1.73
GLUCOSE BLD-MCNC: 208 MG/DL (ref 74–99)
GLUCOSE URINE: 250 MG/DL
HCT VFR BLD CALC: 33.3 % (ref 34–48)
HEMOGLOBIN: 10.7 G/DL (ref 11.5–15.5)
IMMATURE GRANULOCYTES #: 0.12 E9/L
IMMATURE GRANULOCYTES %: 0.7 % (ref 0–5)
INFLUENZA A: NOT DETECTED
INFLUENZA B: NOT DETECTED
KETONES, URINE: NEGATIVE MG/DL
LACTIC ACID, SEPSIS: 1.4 MMOL/L (ref 0.5–1.9)
LEUKOCYTE ESTERASE, URINE: ABNORMAL
LIPASE: 16 U/L (ref 13–60)
LYMPHOCYTES ABSOLUTE: 0.91 E9/L (ref 1.5–4)
LYMPHOCYTES RELATIVE PERCENT: 5.1 % (ref 20–42)
MAGNESIUM: 2.2 MG/DL (ref 1.6–2.6)
MCH RBC QN AUTO: 35.7 PG (ref 26–35)
MCHC RBC AUTO-ENTMCNC: 32.1 % (ref 32–34.5)
MCV RBC AUTO: 111 FL (ref 80–99.9)
MONOCYTES ABSOLUTE: 1.03 E9/L (ref 0.1–0.95)
MONOCYTES RELATIVE PERCENT: 5.8 % (ref 2–12)
NEUTROPHILS ABSOLUTE: 15.46 E9/L (ref 1.8–7.3)
NEUTROPHILS RELATIVE PERCENT: 87.4 % (ref 43–80)
NITRITE, URINE: NEGATIVE
OVALOCYTES: ABNORMAL
PDW BLD-RTO: 15.2 FL (ref 11.5–15)
PH UA: 8.5 (ref 5–9)
PLATELET # BLD: 186 E9/L (ref 130–450)
PMV BLD AUTO: 10.4 FL (ref 7–12)
POIKILOCYTES: ABNORMAL
POLYCHROMASIA: ABNORMAL
POTASSIUM SERPL-SCNC: 4.5 MMOL/L (ref 3.5–5)
PROTEIN UA: 100 MG/DL
RBC # BLD: 3 E12/L (ref 3.5–5.5)
RBC UA: ABNORMAL /HPF (ref 0–2)
SARS-COV-2 RNA, RT PCR: NOT DETECTED
SODIUM BLD-SCNC: 139 MMOL/L (ref 132–146)
SPECIFIC GRAVITY UA: 1.01 (ref 1–1.03)
TOTAL PROTEIN: 6.1 G/DL (ref 6.4–8.3)
TROPONIN, HIGH SENSITIVITY: 93 NG/L (ref 0–9)
TROPONIN, HIGH SENSITIVITY: 99 NG/L (ref 0–9)
UROBILINOGEN, URINE: 0.2 E.U./DL
WBC # BLD: 17.7 E9/L (ref 4.5–11.5)
WBC UA: >20 /HPF (ref 0–5)

## 2023-02-09 PROCEDURE — 87636 SARSCOV2 & INF A&B AMP PRB: CPT

## 2023-02-09 PROCEDURE — 87040 BLOOD CULTURE FOR BACTERIA: CPT

## 2023-02-09 PROCEDURE — 2500000003 HC RX 250 WO HCPCS: Performed by: EMERGENCY MEDICINE

## 2023-02-09 PROCEDURE — 2700000000 HC OXYGEN THERAPY PER DAY

## 2023-02-09 PROCEDURE — 71045 X-RAY EXAM CHEST 1 VIEW: CPT

## 2023-02-09 PROCEDURE — 84484 ASSAY OF TROPONIN QUANT: CPT

## 2023-02-09 PROCEDURE — 80053 COMPREHEN METABOLIC PANEL: CPT

## 2023-02-09 PROCEDURE — 6370000000 HC RX 637 (ALT 250 FOR IP): Performed by: FAMILY MEDICINE

## 2023-02-09 PROCEDURE — 2580000003 HC RX 258: Performed by: EMERGENCY MEDICINE

## 2023-02-09 PROCEDURE — 2580000003 HC RX 258: Performed by: INTERNAL MEDICINE

## 2023-02-09 PROCEDURE — 83605 ASSAY OF LACTIC ACID: CPT

## 2023-02-09 PROCEDURE — 6360000002 HC RX W HCPCS: Performed by: EMERGENCY MEDICINE

## 2023-02-09 PROCEDURE — 83690 ASSAY OF LIPASE: CPT

## 2023-02-09 PROCEDURE — 6360000002 HC RX W HCPCS: Performed by: INTERNAL MEDICINE

## 2023-02-09 PROCEDURE — 81001 URINALYSIS AUTO W/SCOPE: CPT

## 2023-02-09 PROCEDURE — 5A1D70Z PERFORMANCE OF URINARY FILTRATION, INTERMITTENT, LESS THAN 6 HOURS PER DAY: ICD-10-PCS | Performed by: INTERNAL MEDICINE

## 2023-02-09 PROCEDURE — 83735 ASSAY OF MAGNESIUM: CPT

## 2023-02-09 PROCEDURE — 90935 HEMODIALYSIS ONE EVALUATION: CPT

## 2023-02-09 PROCEDURE — 71250 CT THORAX DX C-: CPT

## 2023-02-09 PROCEDURE — 6360000002 HC RX W HCPCS: Performed by: FAMILY MEDICINE

## 2023-02-09 PROCEDURE — 2140000000 HC CCU INTERMEDIATE R&B

## 2023-02-09 PROCEDURE — 85025 COMPLETE CBC W/AUTO DIFF WBC: CPT

## 2023-02-09 RX ORDER — LACTULOSE 10 G/15ML
10 SOLUTION ORAL DAILY PRN
COMMUNITY

## 2023-02-09 RX ORDER — ACETAMINOPHEN 325 MG/1
650 TABLET ORAL EVERY 6 HOURS PRN
Status: DISCONTINUED | OUTPATIENT
Start: 2023-02-09 | End: 2023-02-11 | Stop reason: HOSPADM

## 2023-02-09 RX ORDER — NICOTINE POLACRILEX 4 MG
15 LOZENGE BUCCAL PRN
COMMUNITY

## 2023-02-09 RX ORDER — SENNA PLUS 8.6 MG/1
2 TABLET ORAL 2 TIMES DAILY
COMMUNITY

## 2023-02-09 RX ORDER — SODIUM CHLORIDE 0.9 % (FLUSH) 0.9 %
5-40 SYRINGE (ML) INJECTION EVERY 12 HOURS SCHEDULED
Status: DISCONTINUED | OUTPATIENT
Start: 2023-02-09 | End: 2023-02-11 | Stop reason: HOSPADM

## 2023-02-09 RX ORDER — INSULIN LISPRO 100 [IU]/ML
0-10 INJECTION, SOLUTION INTRAVENOUS; SUBCUTANEOUS
COMMUNITY

## 2023-02-09 RX ORDER — CLINDAMYCIN HYDROCHLORIDE 300 MG/1
300 CAPSULE ORAL 4 TIMES DAILY
Status: ON HOLD | COMMUNITY
Start: 2023-01-27 | End: 2023-02-10 | Stop reason: HOSPADM

## 2023-02-09 RX ORDER — OXYCODONE HYDROCHLORIDE AND ACETAMINOPHEN 5; 325 MG/1; MG/1
1 TABLET ORAL EVERY 6 HOURS PRN
COMMUNITY

## 2023-02-09 RX ORDER — BUSPIRONE HYDROCHLORIDE 10 MG/1
10 TABLET ORAL DAILY
Status: DISCONTINUED | OUTPATIENT
Start: 2023-02-09 | End: 2023-02-11 | Stop reason: HOSPADM

## 2023-02-09 RX ORDER — ONDANSETRON 4 MG/1
4 TABLET, ORALLY DISINTEGRATING ORAL EVERY 8 HOURS PRN
Status: DISCONTINUED | OUTPATIENT
Start: 2023-02-09 | End: 2023-02-11 | Stop reason: HOSPADM

## 2023-02-09 RX ORDER — BENZONATATE 100 MG/1
100 CAPSULE ORAL 3 TIMES DAILY PRN
COMMUNITY

## 2023-02-09 RX ORDER — HYDROCODONE BITARTRATE AND ACETAMINOPHEN 5; 325 MG/1; MG/1
1 TABLET ORAL EVERY 6 HOURS PRN
Status: DISCONTINUED | OUTPATIENT
Start: 2023-02-09 | End: 2023-02-11 | Stop reason: HOSPADM

## 2023-02-09 RX ORDER — HEPARIN SODIUM 10000 [USP'U]/ML
5000 INJECTION, SOLUTION INTRAVENOUS; SUBCUTANEOUS EVERY 8 HOURS SCHEDULED
Status: DISCONTINUED | OUTPATIENT
Start: 2023-02-09 | End: 2023-02-11 | Stop reason: HOSPADM

## 2023-02-09 RX ORDER — ASPIRIN 325 MG
325 TABLET ORAL DAILY
Status: ON HOLD | COMMUNITY
End: 2023-02-10 | Stop reason: HOSPADM

## 2023-02-09 RX ORDER — ACETAMINOPHEN 325 MG/1
650 TABLET ORAL EVERY 4 HOURS PRN
COMMUNITY

## 2023-02-09 RX ORDER — FERRIC CITRATE 210 MG/1
420 TABLET, COATED ORAL
COMMUNITY

## 2023-02-09 RX ORDER — POLYETHYLENE GLYCOL 3350 17 G/17G
17 POWDER, FOR SOLUTION ORAL DAILY PRN
Status: DISCONTINUED | OUTPATIENT
Start: 2023-02-09 | End: 2023-02-11 | Stop reason: HOSPADM

## 2023-02-09 RX ORDER — SODIUM PHOSPHATE, DIBASIC AND SODIUM PHOSPHATE, MONOBASIC 7; 19 G/133ML; G/133ML
1 ENEMA RECTAL
COMMUNITY

## 2023-02-09 RX ORDER — METOPROLOL SUCCINATE 25 MG/1
25 TABLET, EXTENDED RELEASE ORAL DAILY
Status: DISCONTINUED | OUTPATIENT
Start: 2023-02-09 | End: 2023-02-11 | Stop reason: HOSPADM

## 2023-02-09 RX ORDER — EPOETIN ALFA-EPBX 10000 [IU]/ML
10000 INJECTION, SOLUTION INTRAVENOUS; SUBCUTANEOUS SEE ADMIN INSTRUCTIONS
COMMUNITY

## 2023-02-09 RX ORDER — BISACODYL 10 MG
10 SUPPOSITORY, RECTAL RECTAL DAILY PRN
COMMUNITY

## 2023-02-09 RX ORDER — ESCITALOPRAM OXALATE 10 MG/1
10 TABLET ORAL DAILY
Status: DISCONTINUED | OUTPATIENT
Start: 2023-02-09 | End: 2023-02-11 | Stop reason: HOSPADM

## 2023-02-09 RX ORDER — BUSPIRONE HYDROCHLORIDE 10 MG/1
10 TABLET ORAL DAILY
COMMUNITY

## 2023-02-09 RX ORDER — SODIUM CHLORIDE 0.9 % (FLUSH) 0.9 %
5-40 SYRINGE (ML) INJECTION PRN
Status: DISCONTINUED | OUTPATIENT
Start: 2023-02-09 | End: 2023-02-11 | Stop reason: HOSPADM

## 2023-02-09 RX ORDER — ONDANSETRON 4 MG/1
4 TABLET, FILM COATED ORAL EVERY 12 HOURS PRN
COMMUNITY

## 2023-02-09 RX ORDER — SODIUM CHLORIDE 9 MG/ML
INJECTION, SOLUTION INTRAVENOUS PRN
Status: DISCONTINUED | OUTPATIENT
Start: 2023-02-09 | End: 2023-02-11 | Stop reason: HOSPADM

## 2023-02-09 RX ORDER — GABAPENTIN 300 MG/1
300 CAPSULE ORAL 3 TIMES DAILY
Status: DISCONTINUED | OUTPATIENT
Start: 2023-02-09 | End: 2023-02-11 | Stop reason: HOSPADM

## 2023-02-09 RX ORDER — TRAZODONE HYDROCHLORIDE 50 MG/1
25 TABLET ORAL NIGHTLY
COMMUNITY

## 2023-02-09 RX ORDER — ACETAMINOPHEN 650 MG/1
650 SUPPOSITORY RECTAL EVERY 6 HOURS PRN
Status: DISCONTINUED | OUTPATIENT
Start: 2023-02-09 | End: 2023-02-11 | Stop reason: HOSPADM

## 2023-02-09 RX ORDER — ONDANSETRON 2 MG/ML
4 INJECTION INTRAMUSCULAR; INTRAVENOUS EVERY 6 HOURS PRN
Status: DISCONTINUED | OUTPATIENT
Start: 2023-02-09 | End: 2023-02-11 | Stop reason: HOSPADM

## 2023-02-09 RX ORDER — POLYETHYLENE GLYCOL 3350 17 G/17G
17 POWDER, FOR SOLUTION ORAL DAILY PRN
COMMUNITY

## 2023-02-09 RX ADMIN — HEPARIN SODIUM 5000 UNITS: 10000 INJECTION INTRAVENOUS; SUBCUTANEOUS at 20:22

## 2023-02-09 RX ADMIN — HYDROCODONE BITARTRATE AND ACETAMINOPHEN 1 TABLET: 5; 325 TABLET ORAL at 20:22

## 2023-02-09 RX ADMIN — METOPROLOL SUCCINATE 25 MG: 25 TABLET, EXTENDED RELEASE ORAL at 12:19

## 2023-02-09 RX ADMIN — AMPICILLIN SODIUM AND SULBACTAM SODIUM 3000 MG: 2; 1 INJECTION, POWDER, FOR SOLUTION INTRAMUSCULAR; INTRAVENOUS at 18:57

## 2023-02-09 RX ADMIN — AMPICILLIN SODIUM AND SULBACTAM SODIUM 3000 MG: 2; 1 INJECTION, POWDER, FOR SOLUTION INTRAMUSCULAR; INTRAVENOUS at 04:32

## 2023-02-09 RX ADMIN — BUSPIRONE HYDROCHLORIDE 10 MG: 10 TABLET ORAL at 12:19

## 2023-02-09 RX ADMIN — DOXYCYCLINE 100 MG: 100 INJECTION, POWDER, LYOPHILIZED, FOR SOLUTION INTRAVENOUS at 04:30

## 2023-02-09 RX ADMIN — ESCITALOPRAM OXALATE 10 MG: 10 TABLET, FILM COATED ORAL at 12:19

## 2023-02-09 RX ADMIN — GABAPENTIN 300 MG: 300 CAPSULE ORAL at 18:53

## 2023-02-09 RX ADMIN — GABAPENTIN 300 MG: 300 CAPSULE ORAL at 20:22

## 2023-02-09 ASSESSMENT — PAIN DESCRIPTION - DESCRIPTORS: DESCRIPTORS: ACHING;DISCOMFORT

## 2023-02-09 ASSESSMENT — PAIN SCALES - GENERAL
PAINLEVEL_OUTOF10: 9
PAINLEVEL_OUTOF10: 9

## 2023-02-09 NOTE — CONSULTS
[facility]  Nephrology Consult Note    Patient's Name: Claude Jaquez    Reason for Consult: Dialysis    Chief Complaint: Shortness of breath  History Obtained From:  patient, past medical records, and EMR    History of Present Illness:    Claude Jaquez is a 67 y.o. female with a past medical history of COVID, ESRD on hemodialysis, hypertension, and diabetes mellitus. She presented to the ED on 2/8 with complaints of shortness of breath and vomiting. Vital signs on 2/8 include temperature 98.8, respiration 17, pulse 101, /98, and she was 93% SPO2. Lab data on 2/9 includes BUN 31, creatinine 5.4, WBC 17.7, and hemoglobin 10.7. She had a UA which showed many bacteria, large leukocyte esterase, moderate blood, 100 protein, negative nitrites. Chest x-ray on 2/9 showed hazy airspace opacities, concerning for pneumonia. We were consulted to see the patient for dialysis. Patient is known to our service and dialyzes at the dialysis den at [facility]. At present, patient was seen and examined. She reports that she feels alright. She reports that she came in due to shortness of breath. She denies any current chest pain or shortness of breath. She denies any abdominal pain, nausea, vomiting, or diarrhea. She denies any headaches or dizziness. She denies any fevers or chills.     PMH:    Past Medical History:   Diagnosis Date    Anemia in chronic kidney disease     Anxiety and depression     Arthritis     Chronic pain syndrome     COVID-19 05/2020    COVID-19     Decreased dorsalis pedis pulse 10/25/2022    Diabetes mellitus ([address]     Dysphagia, oral phase     ESRD on hemodialysis ([address] 10/25/2022    GERD (gastroesophageal reflux disease)     Hemodialysis patient ([address]     M-W-F    Hyperkalemia     Hypertension     Hypertensive kidney disease with stage 4 chronic kidney disease (HCC)     Insomnia     Kidney stones     MDD (major depressive disorder)     Moderate protein-calorie malnutrition ([address] Morbid obesity (HCC)     Muscle weakness (generalized)     Obesity     Pressure injury of sacral region, stage 3 ([address] 10/25/2022    Pressure ulcer of sacral region     Sacral pressure ulcer 08/03/2021    stage 4    Unspecified osteoarthritis, unspecified site     Weakness generalized        Patient Active Problem List   Diagnosis    Neurologic gait dysfunction    Diabetes mellitus ([address]    Hypertension    Arthritis    Knee problem    Anemia due to stage 3 chronic kidney disease    Primary osteoarthritis of both knees    Moderate protein-calorie malnutrition (HCC)    Pressure injury of sacral region, stage 4 (HCC)    Pressure injury of right hip, stage 3 (HCC)    Left bundle branch block    Moderate obesity    Failure to thrive in adult    Pressure injury of sacral region, stage 3 (HCC)    Decreased dorsalis pedis pulse    ESRD on hemodialysis ([facility] [address]    Closed bicondylar fracture of distal femur, right, initial encounter ([facility] [address]    Other fracture of right femur, initial encounter for closed fracture ([facility] [address]    Closed fracture of distal ends of left radius and ulna    Closed bicondylar fracture of distal end of right femur ([facility] [address]    Metabolic encephalopathy    Acute respiratory failure with hypoxia ([address]    Pneumonia due to infectious organism, unspecified laterality, unspecified part of lung       Diet:    ADULT DIET;  Regular    Meds:     busPIRone  10 mg Oral Daily    [START ON 2/10/2023] epoetin derek-epbx  10,000 Units SubCUTAneous Once per day on Mon Wed Fri    escitalopram  10 mg Oral Daily    gabapentin  300 mg Oral TID    metoprolol succinate  25 mg Oral Daily    [START ON 2/10/2023] midodrine  15 mg Oral Once per day on Mon Wed Fri    sodium chloride flush  5-40 mL IntraVENous 2 times per day    heparin (porcine)  5,000 Units SubCUTAneous 3 times per day    ampicillin-sulbactam  3,000 mg IntraVENous Q24H        sodium chloride         Meds prn:     sodium chloride flush, sodium chloride, ondansetron **OR** ondansetron, polyethylene glycol, acetaminophen **OR** acetaminophen    Meds prior to admission:     No current facility-administered medications on file prior to encounter.      Current Outpatient Medications on File Prior to Encounter   Medication Sig Dispense Refill    aspirin 325 MG tablet Take 325 mg by mouth daily      ferric citrate (AURYXIA) 1  MG(Fe) TABS tablet Take 420 mg by mouth 3 times daily (with meals)      benzonatate (TESSALON) 100 MG capsule Take 100 mg by mouth 3 times daily as needed for Cough      bisacodyl (DULCOLAX) 10 MG suppository Place 10 mg rectally daily as needed for Constipation      clindamycin (CLEOCIN) 300 MG capsule Take 300 mg by mouth 4 times daily      sodium phosphate (FLEET) 7-19 GM/118ML Place 1 enema rectally once as needed (constipation)      glucagon 1 MG injection Inject 1 kit into the muscle as needed (hypoglycemia)      glucose (GLUTOSE) 40 % GEL Take 15 g by mouth as needed (hypoglycemia)      guaiFENesin 200 MG/5ML LIQD Take 200 mg by mouth every 6 hours as needed (cough)      insulin lispro, 1 Unit Dial, (HUMALOG/ADMELOG) 100 UNIT/ML SOPN Inject 0-10 Units into the skin 3 times daily (before meals) *Per Sliding Scale*      lactulose (CHRONULAC) 10 GM/15ML solution Take 10 g by mouth daily as needed (constipation)      busPIRone (BUSPAR) 10 MG tablet Take 10 mg by mouth daily      polyethylene glycol (GLYCOLAX) 17 g packet Take 17 g by mouth daily as needed for Constipation      oxyCODONE-acetaminophen (PERCOCET) 5-325 MG per tablet Take 1 tablet by mouth every 6 hours as needed for Pain.      epoetin derek-epbx (RETACRIT) 60004 UNIT/ML SOLN injection Inject 10,000 Units into the skin See Admin Instructions Given Tuesday,Thursday,Saturday      senna (SENOKOT) 8.6 MG tablet Take 2 tablets by mouth 2 times daily      traZODone (DESYREL) 50 MG tablet Take 25 mg by mouth nightly      acetaminophen (TYLENOL) 325 MG tablet Take 650 mg by mouth every 4 hours as needed for Pain or Fever      vitamin D (CHOLECALCIFEROL) 25 MCG (1000 UT) TABS tablet Take 1,000 Units by mouth daily      ondansetron (ZOFRAN) 4 MG tablet Take 4 mg by mouth every 12 hours as needed for Nausea or Vomiting      insulin glargine (LANTUS) 100 UNIT/ML injection vial Inject 6 Units into the skin nightly 10 mL 3    midodrine (PROAMATINE) 5 MG tablet Take 15 mg by mouth See Admin Instructions Given Monday,Wednesday,Friday      metoprolol succinate (TOPROL XL) 25 MG extended release tablet Take 1 tablet by mouth daily 30 tablet 3    escitalopram (LEXAPRO) 10 MG tablet Take 10 mg by mouth daily      gabapentin (NEURONTIN) 300 MG capsule Take 300 mg by mouth daily. Allergies:    Patient has no known allergies. Social History:     reports that she quit smoking about 11 years ago. Her smoking use included cigarettes. She has a 30.00 pack-year smoking history. She has never used smokeless tobacco. She reports that she does not drink alcohol and does not use drugs. Family History:         Problem Relation Age of Onset    Cancer Sister        Review of Systems:   Pertinent items are noted in HPI.     Physical Exam:      Patient Vitals for the past 24 hrs:   BP Temp Temp src Pulse Resp SpO2 Height Weight   02/09/23 1500 (!) 124/26 -- -- 68 -- -- -- --   02/09/23 1400 (!) 171/50 -- -- 64 -- -- -- --   02/09/23 1343 (!) 147/50 -- -- 72 -- -- -- --   02/09/23 1338 (!) 138/36 97.3 °F (36.3 °C) -- 87 18 -- -- --   02/09/23 1219 (!) 160/67 -- -- 80 -- -- -- --   02/09/23 1000 (!) 135/42 -- -- 62 15 99 % -- --   02/09/23 0945 -- -- -- 65 16 99 % -- --   02/09/23 0930 -- -- -- 67 15 98 % -- --   02/09/23 0915 110/63 -- -- 64 17 99 % -- --   02/09/23 0900 -- -- -- 66 15 100 % -- --   02/09/23 0845 97/67 -- -- 67 19 99 % -- --   02/09/23 0830 (!) 86/52 -- -- 63 14 99 % -- --   02/09/23 0815 -- -- -- 65 18 100 % -- --   02/09/23 0800 (!) 70/60 -- -- 66 15 99 % -- --   02/09/23 0745 -- -- -- 65 14 98 % -- --   02/09/23 0730 -- -- -- 65 15 100 % -- --   02/09/23 0715 -- -- -- 67 19 100 % -- --   02/09/23 0700 (!) 131/43 -- -- 65 16 100 % -- --   02/09/23 0645 -- -- -- 67 19 100 % -- --   02/09/23 0630 -- -- -- 66 15 99 % -- --   02/09/23 0615 -- -- -- 68 15 98 % -- --   02/09/23 0600 (!) 121/43 -- -- 69 16 98 % -- --   02/09/23 0500 (!) 157/47 -- -- 70 15 99 % -- --   02/08/23 2342 (!) 136/98 98.8 °F (37.1 °C) Oral (!) 101 17 93 % 5' 1\" (1.549 m) 190 lb (86.2 kg)       No intake or output data in the 24 hours ending 02/09/23 1509    General: Awake, alert, no acute distress  Neck: No JVD noted  Lungs: Clear bilaterally upper, diminished to the bases bilaterally. Unlabored  CV: Regular rate and rhythm. No rub  Abd: Soft, nontender, nondistended. Active bowel sounds  Skin: Warm and dry. No rash on exposed extremities  Ext: No edema   Neuro: Awake, answers questions appropriately    Data:    Recent Labs     02/09/23 0047   WBC 17.7*   HGB 10.7*   HCT 33.3*   .0*          Recent Labs     02/09/23 0047      K 4.5      CO2 28   CREATININE 5.4*   BUN 31*   LABGLOM 8   GLUCOSE 208*   CALCIUM 9.9   MG 2.2       Vit D, 25-Hydroxy   Date Value Ref Range Status   11/11/2022 32 30 - 100 ng/mL Final     Comment:     <20 ng/mL. ........... Bobetta Ingrid Deficient  20-30 ng/mL. ......... Bobetta Nigrid Insufficient   ng/mL. ........ Bobetta Ingrid Sufficient  >100 ng/mL. .......... Bobetta Ingrid Toxic         PTH   Date Value Ref Range Status   11/06/2022 466 (H) 15 - 65 pg/mL Final       Recent Labs     02/09/23 0047   ALT 9   AST 14   ALKPHOS 69   BILITOT 0.2       Recent Labs     02/09/23 0047   LABALBU 2.9*       Ferritin   Date Value Ref Range Status   05/05/2021 767 ng/mL Final     Comment:     FERRITIN Reference Ranges:  Adult Males   20 - 60 years:    27 - 400 ng/mL  Adult females 16 - 61 years:    15 - 150 ng/mL  Adults greater than 60 years:   no established reference range  Pediatrics:                     no established reference range       Iron   Date Value Ref Range Status   05/05/2021 74 37 - 145 mcg/dL Final     TIBC   Date Value Ref Range Status   05/05/2021 116 (L) 250 - 450 mcg/dL Final       Vitamin B-12   Date Value Ref Range Status   05/05/2021 895 211 - 946 pg/mL Final       Folate   Date Value Ref Range Status   05/05/2021 >20.0 4.8 - 24.2 ng/mL Final       Lab Results   Component Value Date/Time    COLORU Yellow 02/09/2023 03:22 AM    NITRU Negative 02/09/2023 03:22 AM    GLUCOSEU 250 02/09/2023 03:22 AM    GLUCOSEU NEGATIVE 08/17/2011 10:30 PM    KETUA Negative 02/09/2023 03:22 AM    UROBILINOGEN 0.2 02/09/2023 03:22 AM    BILIRUBINUR Negative 02/09/2023 03:22 AM    BILIRUBINUR NEGATIVE 08/17/2011 10:30 PM       No results found for: MAGDA, CREURRAN, MACREATRATIO, OSMOU    No components found for: URIC    No results found for: LIPIDPAN    Assessment and Plans:    ESRD on HD  Outpatient at the dialysis den at [facility]  HD today  Continue HD while inpatient    2. Shortness of breath  CXR 2/9 hazy airspace opacities, concerning for pneumonia  On Unasyn  Pulmonology consulted    3. Anemia of chronic kidney disease  Hemoglobin target 10-12  Hemoglobin 10.7 on 2/9-at target  No ARUN as hemoglobin at target  Transfuse for hemoglobin<7  Monitor H&H    4.  Intermittent hypotension  On midodrine 15 mg oral daily MWF  Monitor BPs    5.  secondary hyperparathyroidism of renal origin   and phosphorus 5 on 2/6 in the outpatient setting  Check Phos in the a.m.    Monitor labs    Denton Sanchez, APRN - CNP

## 2023-02-09 NOTE — ED PROVIDER NOTES
ED PROVIDER NOTE    Chief Complaint   Patient presents with    Other     Pt to to ED via EMS from [place] for N/V. Pt stating she is on ABX for UTI and feels sick when she takes them. Pt states she took her ABX tonight and had emesis x1 and large BM. Pt denies complaints at this time. A&Ox4       HPI:  2/9/23,   Time: 12:35 AM JELENA Fulton is a 67 y.o. female presenting to the ED for shortness of breath and vomiting. Acute onset earlier tonight at her skilled nursing facility. Patient is currently being treated for UTI with antibiotics. Tonight after eating dinner she developed shortness of breath, felt shaky, and had an episode of vomiting. No black or bloody emesis or stools. No other recent illness, fever, chills, cough, chest pain, abdominal pain. Normal p.o. intake and urine output. Currently denies any shortness of breath or nausea.     Chart review: hx of DM, HTN, LBBB, ESRD on hemodialysis, sacral pressure wound    Reviewed discharge summary from 11/25/2022 by Dr. Janna Burris: Patient was admitted at that time for acute confusion, determined to not have UTI, and discharged back to skilled nursing facility    Review of Systems:     Review of Systems  Pertinent positives and negatives as stated in HPI     --------------------------------------------- PAST HISTORY ---------------------------------------------  Past Medical History:   Past Medical History:   Diagnosis Date    Anemia in chronic kidney disease     Anxiety and depression     Arthritis     Chronic pain syndrome     COVID-19 05/2020    COVID-19     Decreased dorsalis pedis pulse 10/25/2022    Diabetes mellitus ([facility]     Dysphagia, oral phase     ESRD on hemodialysis ([facility] 10/25/2022    GERD (gastroesophageal reflux disease)     Hemodialysis patient ([facility]     M-W-F    Hyperkalemia     Hypertension     Hypertensive kidney disease with stage 4 chronic kidney disease ([facility]     Insomnia     Kidney stones     MDD (major depressive disorder) Moderate protein-calorie malnutrition (HCC)     Morbid obesity (HCC)     Muscle weakness (generalized)     Obesity     Pressure injury of sacral region, stage 3 ([address] 10/25/2022    Pressure ulcer of sacral region     Sacral pressure ulcer 08/03/2021    stage 4    Unspecified osteoarthritis, unspecified site     Weakness generalized        Past Surgical History:   Past Surgical History:   Procedure Laterality Date    ABDOMEN SURGERY N/A 5/4/2020    SACRAL WOUND DEBRIDEMENT CALL DRWatson  WITH TIME AVAIL AM performed by Jennifer Najera MD at [address]   x3    CHOLECYSTECTOMY  3/31/16    Laparoscopic-Dr. Severa Gurney  2011     for kidney stones    DIALYSIS FISTULA CREATION Left 8/5/2021    INSERTION FISTULA LEFT ARM performed by Rubia Justice MD at [address] Right 11/18/2021    REVISION AV FISTULA LEFT ARM performed by Rubia Justice MD at [place] [address] Right 11/4/2022    RIGHT DISTAL FEMUR OPEN REDUCTION INTERNAL FIXATION ++SYNTHES++ performed by Raffi Hazel MD at [address]  2009     right     UPPER GASTROINTESTINAL ENDOSCOPY  2.18.15    Dr. Agata Calero Findings: Mild Gastrits and Duodenitis, 2cm Hiatal Hernia    UPPER GASTROINTESTINAL ENDOSCOPY N/A 5/8/2020    EGD CONTROL HEMORRHAGE performed by Jennifer Najera MD at [facility] N/A 5/22/2020    EGD BEDSIDE performed by Vicky Shepard MD at [place] [address] 5/6/2021    INSERTION TUNNELED DIALYSIS CATHETER performed by Rubia Justice MD at [address] [facility] Left 11/4/2022    LEFT DISTAL RADIUS OPEN REDUCTION INTERNAL FIXATION    ++SYNTHES++ performed by Raffi Hazel MD at [facility] OR       Social History:   Social History     Socioeconomic History    Marital status:    Tobacco Use    Smoking status: Former     Packs/day: 1.00     Years: 30.00     Pack years: 30.00     Types: Cigarettes     Quit date: 2011     Years since quittin.5    Smokeless tobacco: Never   Vaping Use    Vaping Use: Never used   Substance and Sexual Activity    Alcohol use: No    Drug use: No    Sexual activity: Not Currently       Family History:   Family History   Problem Relation Age of Onset    Cancer Sister        The patients home medications have been reviewed. Allergies:   No Known Allergies        ---------------------------------------------------PHYSICAL EXAM--------------------------------------    BP (!) 136/98   Pulse (!) 101   Temp 98.8 °F (37.1 °C) (Oral)   Resp 17   Ht 5' 1\" (1.549 m)   Wt 190 lb (86.2 kg)   SpO2 93%   BMI 35.90 kg/m²     Physical Exam  Vitals and nursing note reviewed. Constitutional:       General: She is not in acute distress. Appearance: She is not toxic-appearing. HENT:      Mouth/Throat:      Mouth: Mucous membranes are moist.   Eyes:      General: No scleral icterus. Extraocular Movements: Extraocular movements intact. Pupils: Pupils are equal, round, and reactive to light. Cardiovascular:      Rate and Rhythm: Normal rate and regular rhythm. Pulses: Normal pulses. Heart sounds: Normal heart sounds. No murmur heard. Pulmonary:      Effort: Pulmonary effort is normal. No respiratory distress. Breath sounds: Normal breath sounds. No wheezing or rales. Abdominal:      General: There is no distension. Palpations: Abdomen is soft. Tenderness: There is abdominal tenderness (left flank). There is no guarding or rebound. Musculoskeletal:         General: No swelling or tenderness. Normal range of motion. Cervical back: Normal range of motion and neck supple. Comments: RLE mobility limited due to femur fx. LUE/BLE appropriate strength   Skin:     General: Skin is warm and dry. Neurological:      Mental Status: She is alert and oriented to person, place, and time.       Comments: Moves all extremities with appropriate strength. Sensation grossly intact in all extremities. -------------------------------------------------- RESULTS -------------------------------------------------  I have personally reviewed all laboratory and imaging results for this patient. Results are listed below. LABS:  Labs Reviewed   CBC WITH AUTO DIFFERENTIAL - Abnormal; Notable for the following components:       Result Value    WBC 17.7 (*)     RBC 3.00 (*)     Hemoglobin 10.7 (*)     Hematocrit 33.3 (*)     .0 (*)     MCH 35.7 (*)     RDW 15.2 (*)     Neutrophils % 87.4 (*)     Lymphocytes % 5.1 (*)     Neutrophils Absolute 15.46 (*)     Lymphocytes Absolute 0.91 (*)     Monocytes Absolute 1.03 (*)     All other components within normal limits   COMPREHENSIVE METABOLIC PANEL - Abnormal; Notable for the following components:    Glucose 208 (*)     BUN 31 (*)     Creatinine 5.4 (*)     Total Protein 6.1 (*)     Albumin 2.9 (*)     All other components within normal limits   TROPONIN - Abnormal; Notable for the following components:    Troponin, High Sensitivity 93 (*)     All other components within normal limits   URINALYSIS WITH MICROSCOPIC - Abnormal; Notable for the following components:    Glucose, Ur 250 (*)     Blood, Urine MODERATE (*)     Protein,  (*)     Leukocyte Esterase, Urine LARGE (*)     WBC, UA >20 (*)     RBC, UA 5-10 (*)     Bacteria, UA MANY (*)     All other components within normal limits   TROPONIN - Abnormal; Notable for the following components:    Troponin, High Sensitivity 99 (*)     All other components within normal limits   COVID-19 & INFLUENZA COMBO   CULTURE, BLOOD 1   CULTURE, BLOOD 2   LIPASE   MAGNESIUM   LACTATE, SEPSIS   LACTATE, SEPSIS       RADIOLOGY:  Interpreted personally and by Radiologist.  XR CHEST PORTABLE   Final Result   Hazy airspace opacities, concerning for pneumonia. EKG:  This EKG is signed and interpreted by the EP.     Sinus rhythm, ventricular rate 95 bpm, normal axis, LBBB, no acute injury pattern, no clinically significant change compared w/ prior EKG       ------------------------- NURSING NOTES AND VITALS REVIEWED ---------------------------   The nursing notes within the ED encounter and vital signs as below have been reviewed by myself. BP (!) 136/98   Pulse (!) 101   Temp 98.8 °F (37.1 °C) (Oral)   Resp 17   Ht 5' 1\" (1.549 m)   Wt 190 lb (86.2 kg)   SpO2 93%   BMI 35.90 kg/m²   Oxygen Saturation Interpretation: Normal    The patients available past medical records and past encounters were reviewed. ------------------------------ ED COURSE/MEDICAL DECISION MAKING----------------------  Medications   ampicillin-sulbactam (UNASYN) 3,000 mg in sodium chloride 0.9 % 100 mL IVPB ([id number]) (0 mg IntraVENous Stopped 23)   doxycycline (VIBRAMYCIN) 100 mg in sodium chloride 0.9 % 100 mL IVPB ([id number]) (0 mg IntraVENous Stopped 23)       Independent interpretation of tests:  CXR - no focal consolidation, pneumothorax, or pleural effusion   UA consistent with UTI  High-sensitivity troponin elevated in the setting of end-stage renal disease, repeat is stable  Leukocytosis      Counseling: The emergency provider has spoken with the patient and discussed todays results, in addition to providing specific details for the plan of care and counseling regarding the diagnosis and prognosis. Questions are answered at this time and they are agreeable with the plan. ED Course/Medical Decision Makin y.o. female here from skilled nursing facility with shortness of breath and vomiting. On arrival patient is afebrile, hemodynamically stable, and in no acute distress. Workup notable for UTI and pneumonia. Concern for possible aspiration.  Treated w/ IV abx and admitted to medicine service in stable condition for further management.       --------------------------------- IMPRESSION AND DISPOSITION ---------------------------------    IMPRESSION  1. Acute respiratory failure with hypoxia ([address]    2. Pneumonia of both lower lobes due to infectious organism        DISPOSITION  Disposition: Admit to telemetry  Patient condition is stable    NOTE: This report was transcribed using voice recognition software.  Every effort was made to ensure accuracy; however, inadvertent computerized transcription errors may be present    Chris Yan MD  Attending Emergency Physician         Chris Yan MD  02/09/23 5268

## 2023-02-09 NOTE — CONSULTS
Viridiana Harris M.D.,[facility]  Gertrude Doran D.O., F.A.AISHWARYA.OTYREL., Max Crowley M.D. Natalya Willett M.D. Patricio Luis D.O. Patient:  Melanie García 67 y.o. female MRN: [id number]     Date of Service: 2/9/2023      PULMONARY CONSULTATION    Reason for Consultation: Aspiration pneumonia  Referring Physician: Dr. Marilu Becerril with the referring physician will be sent via the electronic medical record. Chief Complaint: Shortness of breath    CODE STATUS: Full code    SUBJECTIVE:  HPI:  Melanie García is a 67 y.o.  female with a past medical history significant for end-stage renal disease on hemodialysis Monday Wednesday Fridays, stage III sacral ulcers, history of right community distal metaphyseal osteoporotic pathological fracture with left distal radius fracture status post right distal femur open reduction fixation as well as left distal radius open reduction fixation about 12 weeks ago. She states in [facility]. She says she was in her normal state till about yesterday evening . she was started on clindamycin per nursing home about a week ago patient is not sure why it was supposed to be completed by tomorrow she tells me the antibiotics upset her stomach she had nausea and vomiting after having antibiotics and following that developed some shortness of breath she admits that she panicked. She was brought to the emergency room department. Her chest x-ray shows infiltrate in the right lower lobe. Currently laying in bed in no acute distress. We were able to wean her off oxygen saturating 97% on room air. Denies any shortness of her cough at this time.     Past Medical History:   Diagnosis Date    Anemia in chronic kidney disease     Anxiety and depression     Arthritis     Chronic pain syndrome     COVID-19 05/2020    COVID-19     Decreased dorsalis pedis pulse 10/25/2022    Diabetes mellitus ([facility]     Dysphagia, oral phase     ESRD on hemodialysis ([address] 10/25/2022 GERD (gastroesophageal reflux disease)     Hemodialysis patient ([facility]     M-W-F    Hyperkalemia     Hypertension     Hypertensive kidney disease with stage 4 chronic kidney disease (HCC)     Insomnia     Kidney stones     MDD (major depressive disorder)     Moderate protein-calorie malnutrition (HCC)     Morbid obesity (HCC)     Muscle weakness (generalized)     Obesity     Pressure injury of sacral region, stage 3 ([facility] 10/25/2022    Pressure ulcer of sacral region     Sacral pressure ulcer 08/03/2021    stage 4    Unspecified osteoarthritis, unspecified site     Weakness generalized        Past Surgical History:   Procedure Laterality Date    ABDOMEN SURGERY N/A 5/4/2020    SACRAL WOUND DEBRIDEMENT CALL   WITH TIME AVAIL AM performed by Bethany Thorne MD at [address]   x3    CHOLECYSTECTOMY  3/31/16    Laparoscopic-Dr. Stone Gonzalez  2011     for kidney stones    DIALYSIS FISTULA CREATION Left 8/5/2021    INSERTION FISTULA LEFT ARM performed by Roxanne Franks MD at [facility] Right 11/18/2021    REVISION AV FISTULA LEFT ARM performed by Roxanne Franks MD at [facility] Right 11/4/2022    RIGHT DISTAL FEMUR OPEN REDUCTION INTERNAL FIXATION ++SYNTHES++ performed by Yvrose Stephenson MD at [place] [address]  2009     right     UPPER GASTROINTESTINAL ENDOSCOPY  2.18.15    Dr. Erlinda Chadwick Findings: Mild Gastrits and Duodenitis, 2cm Hiatal Hernia    UPPER GASTROINTESTINAL ENDOSCOPY N/A 5/8/2020    EGD CONTROL HEMORRHAGE performed by Bethany Thorne MD at [facility] N/A 5/22/2020    EGD BEDSIDE performed by Minoo Dean MD at [place] [address] 5/6/2021    INSERTION TUNNELED DIALYSIS CATHETER performed by Roxanne Franks MD at [address] [facility] Left 11/4/2022    LEFT DISTAL RADIUS OPEN REDUCTION INTERNAL FIXATION    ++SYNTHES++ performed by Mishel Be Milana Alarcon MD at [facility] OR       Family History   Problem Relation Age of Onset    Cancer Sister        Social History:   Social History     Socioeconomic History    Marital status:      Spouse name: Not on file    Number of children: Not on file    Years of education: Not on file    Highest education level: Not on file   Occupational History    Not on file   Tobacco Use    Smoking status: Former     Packs/day: 1.00     Years: 30.00     Pack years: 30.00     Types: Cigarettes     Quit date: 2011     Years since quittin.5    Smokeless tobacco: Never   Vaping Use    Vaping Use: Never used   Substance and Sexual Activity    Alcohol use: No    Drug use: No    Sexual activity: Not Currently   Other Topics Concern    Not on file   Social History Narrative    Not on file     Social Determinants of Health     Financial Resource Strain: Not on file   Food Insecurity: Not on file   Transportation Needs: Not on file   Physical Activity: Not on file   Stress: Not on file   Social Connections: Not on file   Intimate Partner Violence: Not on file   Housing Stability: Not on file     Smoking history: The patient is a Past smoker of 30 pack years quit in   ETOH:   reports no history of alcohol use. Exposures: There  is not history of TB or TB exposure. There is not asbestos or silica dust exposure. The patient reports does not have coal, foundry, quarry or [employer] exposure. Recent travel history none. There is not  history of recreational or IV drug use. There is not hot tub exposure. The patient does not have any exotic pets, turtles or exotic birds. Vaccines:      Immunization History   Administered Date(s) Administered    COVID-19, PFIZER PURPLE top, DILUTE for use, (age 15 y+), 30mcg/0.3mL 2021, 2021        Home Meds: Not in a hospital admission.     CURRENT MEDS :  Scheduled Meds:   busPIRone  10 mg Oral Daily    [START ON 2/10/2023] epoetin derek-epbx  10,000 Units SubCUTAneous Once per day on Mon Wed Fri    escitalopram  10 mg Oral Daily    gabapentin  300 mg Oral TID    metoprolol succinate  25 mg Oral Daily    [START ON 2/10/2023] midodrine  15 mg Oral Once per day on Mon Wed Fri    sodium chloride flush  5-40 mL IntraVENous 2 times per day    heparin (porcine)  5,000 Units SubCUTAneous 3 times per day    ampicillin-sulbactam  3,000 mg IntraVENous Q24H       Continuous Infusions:   sodium chloride         No Known Allergies    REVIEW OF SYSTEMS:  Constitutional: Denies fever, weight loss, night sweats, and fatigue  Skin: Denies pigmentation, dark lesions, and rashes   HEENT: Denies hearing loss, tinnitus, ear drainage, epistaxis, sore throat, and hoarseness. Cardiovascular: Denies palpitations, chest pain, and chest pressure. Respiratory: Denies cough, dyspnea at rest, hemoptysis, apnea, and choking. Gastrointestinal: Patient does admit to having epistaxis stomach every time she gets her antibiotic clindamycin   genitourinary: Denies dysuria, frequency, urgency or hematuria  Musculoskeletal: Patient has lower extremity weakness  Neurological: Denies dizziness, vertigo, headache, and focal weakness      OBJECTIVE:   BP (!) 147/50   Pulse 72   Temp 97.3 °F (36.3 °C)   Resp 18   Ht 5' 1\" (1.549 m)   Wt 190 lb (86.2 kg)   SpO2 99%   BMI 35.90 kg/m²   SpO2 Readings from Last 1 Encounters:   02/09/23 99%        I/O:  No intake or output data in the 24 hours ending 02/09/23 1402                   Physical Exam:  General: The patient is lying in bed comfortably without any distress. Breathing is not labored  HEENT: Pupils are equal round and reactive to light, there are no oral lesions and no post-nasal drip   Neck: supple without adenopathy  Cardiovascular: regular rate and rhythm without murmur or gallop  Respiratory: Clear to auscultation bilaterally without wheezing or crackles.   Air entry is symmetric  Abdomen: soft, non-tender, non-distended, normal bowel sounds  Extremities: warm, no edema, no clubbing  Skin: Patient has reportedly stage III decubiti ulcer on her back  Neurologic: CN II-XII grossly intact, no focal deficits    Pulmonary Function Testing personally reviewed and interpreted      Imaging personally reviewed:              Narrative   EXAMINATION:   ONE XRAY VIEW OF THE CHEST       2/9/2023 12:43 am       COMPARISON:   04/12/2022       HISTORY:   ORDERING SYSTEM PROVIDED HISTORY: shortness of breath   TECHNOLOGIST PROVIDED HISTORY:   Reason for exam:->shortness of breath       FINDINGS:   The cardiomediastinal silhouette is stable. There are hazy bilateral   airspace opacities, greater in the right lower lobe. No pneumothorax or   large pleural effusion. Impression   Hazy airspace opacities, concerning for pneumonia. Echo:      Labs:  Lab Results   Component Value Date/Time    WBC 17.7 02/09/2023 12:47 AM    HGB 10.7 02/09/2023 12:47 AM    HCT 33.3 02/09/2023 12:47 AM    .0 02/09/2023 12:47 AM    MCH 35.7 02/09/2023 12:47 AM    MCHC 32.1 02/09/2023 12:47 AM    RDW 15.2 02/09/2023 12:47 AM     02/09/2023 12:47 AM    MPV 10.4 02/09/2023 12:47 AM     Lab Results   Component Value Date/Time     02/09/2023 12:47 AM    K 4.5 02/09/2023 12:47 AM    K 3.8 11/30/2022 07:25 AM     02/09/2023 12:47 AM    CO2 28 02/09/2023 12:47 AM    BUN 31 02/09/2023 12:47 AM    CREATININE 5.4 02/09/2023 12:47 AM    LABALBU 2.9 02/09/2023 12:47 AM    LABALBU 4.1 10/12/2011 09:59 AM    CALCIUM 9.9 02/09/2023 12:47 AM    GFRAA 7 10/12/2022 03:49 AM    LABGLOM 8 02/09/2023 12:47 AM     Lab Results   Component Value Date/Time    PROTIME 10.6 11/23/2022 04:26 AM    PROTIME 15.3 06/25/2011 07:00 PM    INR 1.0 11/23/2022 04:26 AM     No results for input(s): PROBNP in the last 72 hours. No results for input(s): TROPONINI in the last 72 hours. No results for input(s): PROCAL in the last 72 hours. This SmartLink has not been configured with any valid records. Micro: No results for input(s): CULTRESP in the last 72 hours. No results for input(s): LABGRAM in the last 72 hours. No results for input(s): LEGUR in the last 72 hours. No results for input(s): STREPNEUMAGU in the last 72 hours. No results for input(s): LP1UAG in the last 72 hours. Assessment:  Aspiration pneumonia  Transient hypoxia currently resolved  End-stage renal disease on hemodialysis  Severe deconditioning  Stage III decubiti ulcer  Chronic macrocytic anemia    Plan:  Given patient's history I will continue to cover her with Unasyn discussed with pharmacy with adjust the dose to her renal function  Check a Legionella and strep urinary antigens  We will obtain a CT of her chest without contrast  Consult wound care  Dialysis per nephrology  Consult  PT OT      Thank you for allowing me to participate in the care of [facility]. Please feel free to call with questions. Electronically signed by Sha Clarke MD on 2/9/2023 at 2:02 PM      Note: This report was completed utilizing computer voice recognition software.  Every effort has been made to ensure accuracy, however; inadvertent computerized transcription errors may be present

## 2023-02-09 NOTE — ED NOTES
Pt was placed on monitor. EMS reports pt does not normally wear NC, but the nursing home placed it on her prior to their arrival. SpO2 was 80% on room air when NC was removed. Pt was placed on 3L NC. EKG performed and was given to attending.      Hayley Harrison RN  02/09/23 2299

## 2023-02-09 NOTE — FLOWSHEET NOTE
02/09/23 1721   Vital Signs   BP (!) 125/52   Temp 97.7 °F (36.5 °C)   Heart Rate 94   Resp 18   Weight   (cart)   Pain Assessment   Pain Assessment 0-10   Pain Level 9   Post-Hemodialysis Assessment   Post-Treatment Procedures Blood returned; Access bleeding time < 10 minutes   Machine Disinfection Process Heat Disinfect; Exterior Machine Disinfection   Rinseback Volume (ml) 300 ml   Blood Volume Processed (Liters) 58.8 l/min   Dialyzer Clearance Moderately streaked   Duration of Treatment (minutes) 210 minutes   Heparin Amount Administered During Treatment (mL) 0 mL   Hemodialysis Intake (ml) 300 ml   Hemodialysis Output (ml) 1800 ml   NET Removed (ml) 1500   Tolerated Treatment Good   Patient Response to Treatment tolerated well   Bilateral Breath Sounds Diminished   RLE Edema +1   LLE Edema +1   Physician Notified No   Time Off 3580   Patient Disposition Return to room

## 2023-02-09 NOTE — H&P
[address]                  [address] [facility]  [facility]                              HISTORY AND PHYSICAL    PATIENT NAME: Tremayne Burt                      :        1950  MED REC NO:   [id number]                            ROOM:       17B  ACCOUNT NO:   [de-identified]                           ADMIT DATE: 2023  PROVIDER:     Mj Willis MD    CHIEF COMPLAINT:  Hypoxia, shortness of breath. HISTORY OF PRESENTING ILLNESS:  A 78-year-old residing at the nursing  home, chronic dialysis patient, also has chronic sacral decubitus, who  presented to the Emergency Room due to shortness of breath. It appears  that she had an emesis yesterday, which possibly causes could be  aspiration, but she became short of breath and hypoxic and was brought  to the Emergency Room and is being admitted for the same. Presently,  she appears to be comfortable on 2-L nasal cannula. She denies any  chest pain or shortness of breath at this time. She does receive  dialysis 3 days a week. Also, of note it appeared she had been on  antibiotics for UTI at the nursing home. She denied any fever or chills  prior to this. Currently denies nausea at this point or abdominal pain. PAST MEDICAL HISTORY:  Positive for renal failure on hemodialysis;  diabetes, remote history of sarcoidosis. She has had multiple  fractures, the last one I believe is of the femur. She also has anemia  of chronic kidney disease, previous hypertension and also orthostatic  hypotension. Multiple surgical procedures, the last one was sent due to  a femoral fracture and also wrist fracture. SOCIAL HISTORY:  Does not drink. Does not smoke. FAMILY MEDICAL HISTORY: Noncontributory. ALLERGIES:  None known. REVIEW OF SYSTEMS:  SKIN/LYMPHATICS:  She has chronic sacral decubitus.   CIRCULATORY:  Presently denies chest pain, orthopnea, PND.  DIGESTIVE:  Episode of vomiting and large bowel movement yesterday as  described. Genitourinary appears she was being treated for UTI at the nursing home. Denies any dysuria, frequency or hematuria. PHYSICAL EXAMINATION:  GENERAL:  Lying in bed, on 2 liters O2, does not appear to be in any  distress. VITAL SIGNS:  Temperature of 98.8, respirations 15, pulse is 62, blood  pressure 135/42, O2 sat on nasal cannula is 99%. Pallor but no  jaundice. NECK:  Supple without bruits or masses. HEART: Regular rate and rhythm without murmur, gallop or rub. CHEST:  Diminished breath sounds at the bases. ABDOMEN:  Soft, nontender. No guarding, no rebound. LOWER EXTREMITIES:  Reveal no edema. NEUROLOGICAL:  She is alert and oriented x3. There are no focal  neurological deficits. SKIN:  No pallor but presence of chronic  decubitus in the sacral area. LABORATORY DATA:  White count is elevated at 17, hemoglobin 10.7, BUN  31, creatinine 5.4. Urinalysis looks positive. Influenza COVID testing  negative. Chest x-ray shows hazy airspace opacities concerning for  pneumonia. IMPRESSION:  Respiratory distress with hypoxia, possibly aspiration  pneumonia. PLAN:  The patient will be admitted to Telemetry. Continue O2. She had  been started on antibiotics in ER. We will consult Pulmonary for  further evaluation and treatment. We  will add a procalcitonin level  also. Also consult nephrology for continuing dialysis and wound care. Discharge plan will be back to skilled nursing facility once stable.         Andres Pate MD    D: 02/09/2023 10:36:05       T: 02/09/2023 10:39:36     [email or web address]  Job#: [id number]     Doc#: [id number]    CC:

## 2023-02-09 NOTE — ED NOTES
Pt states she gets dialysis 4 days a week, Monday, Tuesday , Thursday, and Friday.        Bladimir Driver RN  02/09/23 0476

## 2023-02-10 VITALS
WEIGHT: 190 LBS | HEART RATE: 89 BPM | RESPIRATION RATE: 16 BRPM | DIASTOLIC BLOOD PRESSURE: 63 MMHG | HEIGHT: 61 IN | SYSTOLIC BLOOD PRESSURE: 139 MMHG | TEMPERATURE: 98 F | OXYGEN SATURATION: 90 % | BODY MASS INDEX: 35.87 KG/M2

## 2023-02-10 LAB
BLOOD CULTURE, ROUTINE: NORMAL
CULTURE, BLOOD 2: NORMAL
EKG ATRIAL RATE: 63 BPM
EKG Q-T INTERVAL: 446 MS
EKG QRS DURATION: 144 MS
EKG QTC CALCULATION (BAZETT): 560 MS
EKG R AXIS: 48 DEGREES
EKG T AXIS: 88 DEGREES
EKG VENTRICULAR RATE: 95 BPM

## 2023-02-10 PROCEDURE — 2580000003 HC RX 258: Performed by: FAMILY MEDICINE

## 2023-02-10 PROCEDURE — 2140000000 HC CCU INTERMEDIATE R&B

## 2023-02-10 PROCEDURE — 93010 ELECTROCARDIOGRAM REPORT: CPT | Performed by: INTERNAL MEDICINE

## 2023-02-10 PROCEDURE — 6370000000 HC RX 637 (ALT 250 FOR IP): Performed by: FAMILY MEDICINE

## 2023-02-10 PROCEDURE — 97166 OT EVAL MOD COMPLEX 45 MIN: CPT

## 2023-02-10 PROCEDURE — 2500000003 HC RX 250 WO HCPCS: Performed by: FAMILY MEDICINE

## 2023-02-10 PROCEDURE — 6360000002 HC RX W HCPCS: Performed by: FAMILY MEDICINE

## 2023-02-10 PROCEDURE — 97161 PT EVAL LOW COMPLEX 20 MIN: CPT

## 2023-02-10 PROCEDURE — 97530 THERAPEUTIC ACTIVITIES: CPT

## 2023-02-10 PROCEDURE — 97535 SELF CARE MNGMENT TRAINING: CPT

## 2023-02-10 RX ADMIN — GABAPENTIN 300 MG: 300 CAPSULE ORAL at 14:18

## 2023-02-10 RX ADMIN — HYDROCODONE BITARTRATE AND ACETAMINOPHEN 1 TABLET: 5; 325 TABLET ORAL at 18:38

## 2023-02-10 RX ADMIN — GABAPENTIN 300 MG: 300 CAPSULE ORAL at 09:48

## 2023-02-10 RX ADMIN — SODIUM CHLORIDE, PRESERVATIVE FREE 10 ML: 5 INJECTION INTRAVENOUS at 09:48

## 2023-02-10 RX ADMIN — HEPARIN SODIUM 5000 UNITS: 10000 INJECTION INTRAVENOUS; SUBCUTANEOUS at 14:20

## 2023-02-10 RX ADMIN — METOPROLOL SUCCINATE 25 MG: 25 TABLET, EXTENDED RELEASE ORAL at 09:47

## 2023-02-10 RX ADMIN — MICONAZOLE NITRATE: 20.6 POWDER TOPICAL at 15:03

## 2023-02-10 RX ADMIN — SODIUM CHLORIDE, PRESERVATIVE FREE 10 ML: 5 INJECTION INTRAVENOUS at 00:58

## 2023-02-10 RX ADMIN — HEPARIN SODIUM 5000 UNITS: 10000 INJECTION INTRAVENOUS; SUBCUTANEOUS at 05:29

## 2023-02-10 RX ADMIN — ESCITALOPRAM OXALATE 10 MG: 10 TABLET, FILM COATED ORAL at 09:47

## 2023-02-10 RX ADMIN — BUSPIRONE HYDROCHLORIDE 10 MG: 10 TABLET ORAL at 09:48

## 2023-02-10 ASSESSMENT — PAIN SCALES - GENERAL
PAINLEVEL_OUTOF10: 4
PAINLEVEL_OUTOF10: 0

## 2023-02-10 ASSESSMENT — PAIN DESCRIPTION - LOCATION: LOCATION: BACK

## 2023-02-10 NOTE — PROGRESS NOTES
Subjective: The patient is awake and alert. No problems overnight. Denies chest pain, angina, and dyspnea. Denies abdominal pain. Tolerating diet. No nausea or vomiting. She wants to be discharged. Objective:    BP (!) 158/48   Pulse 72   Temp 98.2 °F (36.8 °C) (Oral)   Resp 18   Ht 5' 1\" (1.549 m)   Wt 190 lb (86.2 kg)   SpO2 97%   BMI 35.90 kg/m²     Heart:  RRR, no murmurs, gallops, or rubs.   Lungs:  CTA bilaterally, no wheeze, rales or rhonchi  Abd: bowel sounds present, nontender, nondistended, no masses  Extrem:  No clubbing, cyanosis, or edema    CBC with Differential:    Lab Results   Component Value Date/Time    WBC 17.7 02/09/2023 12:47 AM    RBC 3.00 02/09/2023 12:47 AM    HGB 10.7 02/09/2023 12:47 AM    HCT 33.3 02/09/2023 12:47 AM     02/09/2023 12:47 AM    .0 02/09/2023 12:47 AM    MCH 35.7 02/09/2023 12:47 AM    MCHC 32.1 02/09/2023 12:47 AM    RDW 15.2 02/09/2023 12:47 AM    NRBC 0.0 11/26/2022 07:02 AM    SEGSPCT 71 08/16/2013 05:00 AM    BANDSPCT 1 03/29/2016 07:37 PM    METASPCT 0.9 05/10/2020 03:45 AM    LYMPHOPCT 5.1 02/09/2023 12:47 AM    PROMYELOPCT 0.9 11/07/2022 05:59 AM    MONOPCT 5.8 02/09/2023 12:47 AM    MYELOPCT 0.9 11/26/2022 07:02 AM    BASOPCT 0.4 02/09/2023 12:47 AM    MONOSABS 1.03 02/09/2023 12:47 AM    LYMPHSABS 0.91 02/09/2023 12:47 AM    EOSABS 0.10 02/09/2023 12:47 AM    BASOSABS 0.07 02/09/2023 12:47 AM     CMP:    Lab Results   Component Value Date/Time     02/09/2023 12:47 AM    K 4.5 02/09/2023 12:47 AM    K 3.8 11/30/2022 07:25 AM     02/09/2023 12:47 AM    CO2 28 02/09/2023 12:47 AM    BUN 31 02/09/2023 12:47 AM    CREATININE 5.4 02/09/2023 12:47 AM    GFRAA 7 10/12/2022 03:49 AM    LABGLOM 8 02/09/2023 12:47 AM    GLUCOSE 208 02/09/2023 12:47 AM    GLUCOSE 149 10/12/2011 09:59 AM    PROT 6.1 02/09/2023 12:47 AM    LABALBU 2.9 02/09/2023 12:47 AM    LABALBU 4.1 10/12/2011 09:59 AM    CALCIUM 9.9 02/09/2023 12:47 AM    BILITOT 0.2 02/09/2023 12:47 AM    ALKPHOS 69 02/09/2023 12:47 AM    AST 14 02/09/2023 12:47 AM    ALT 9 02/09/2023 12:47 AM        Assessment:    Patient Active Problem List   Diagnosis    Neurologic gait dysfunction    Diabetes mellitus ([address]    Hypertension    Arthritis    Knee problem    Anemia due to stage 3 chronic kidney disease    Primary osteoarthritis of both knees    Moderate protein-calorie malnutrition (HCC)    Pressure injury of sacral region, stage 4 (HCC)    Pressure injury of right hip, stage 3 (HCC)    Left bundle branch block    Moderate obesity    Failure to thrive in adult    Pressure injury of sacral region, stage 3 (HCC)    Decreased dorsalis pedis pulse    ESRD on hemodialysis ([address]    Closed bicondylar fracture of distal femur, right, initial encounter ([address]    Other fracture of right femur, initial encounter for closed fracture ([address]    Closed fracture of distal ends of left radius and ulna    Closed bicondylar fracture of distal end of right femur ([address]    Metabolic encephalopathy    Acute respiratory failure with hypoxia ([address]    Pneumonia due to infectious organism, unspecified laterality, unspecified part of lung       Plan:  Disharge back to SNF        Clarice Beard MD  8:51 AM  2/10/2023

## 2023-02-10 NOTE — CARE COORDINATION
2/10:  Transition of care:  Pt presented to the Er for SOB & vomiting from [facility]. CM spoke with pt bedside. Pt's dc plan is tor return to [facility]. Pt is active with [facility] in house HD M-T-Th-Friday. Per Tamara Gomez can accept back but can't get HD this weekend 1st available would be on Monday. [facility] to contact [facility] to see if ok with DC today. Pt was receiving dialysis at [phone] m,w,f.  [facility] can accept back without any needs. Pt set up for transportation with [facility] at 7pm.  Sw/KAREN will continue to follow for dc planning.   Electronically signed by Thomas Frias RN on 2/10/2023 at 10:26 AM

## 2023-02-10 NOTE — DISCHARGE INSTR - COC
Continuity of Care Form    Patient Name: Cesia Hernández   :  1950  MRN:  [id number]    Admit date:  2023  Discharge date:  2/10/2023    Code Status Order: Full Code   Advance Directives:     Admitting Physician:  Clarice Beard MD  PCP: Clarice Beard MD    Discharging Nurse: [facility] Unit/Room#: 7509/3294-E  Discharging Unit Phone Number: [phone]    Emergency Contact:   Extended Emergency Contact Information  Primary Emergency Contact: Johnson Mcguire  Address: [address]  Phone: [phone]  Mobile Phone: [phone]  Relation: Child   needed? No  Secondary Emergency Contact: Karolina Hodge [facility] [address]  Phone: [phone]  Mobile Phone: [phone]  Relation: Child   needed? No    Past Surgical History:  Past Surgical History:   Procedure Laterality Date    ABDOMEN SURGERY N/A 2020    SACRAL WOUND DEBRIDEMENT CALL   WITH TIME AVAIL AM performed by Darcy Hoang MD at [address]   x3    CHOLECYSTECTOMY  3/31/16    Laparoscopic-Dr. Bebeto Potter       for kidney stones    DIALYSIS FISTULA CREATION Left 2021    INSERTION FISTULA LEFT ARM performed by Darren Denton MD at [address] Right 2021    REVISION AV FISTULA LEFT ARM performed by Darren Denton MD at [address] Right 2022    RIGHT DISTAL FEMUR OPEN REDUCTION INTERNAL FIXATION ++SYNTHES++ performed by Starr Bain MD at [place] [address]       right     UPPER GASTROINTESTINAL ENDOSCOPY  2.18.15    Dr. Larissa Barrientos Findings: Mild Gastrits and Duodenitis, 2cm Hiatal Hernia    UPPER GASTROINTESTINAL ENDOSCOPY N/A 2020    [facility] N/A 2020    EGD BEDSIDE performed by Yahaira Sena MD at [address] [place] [address] SURGERY N/A 5/6/2021    INSERTION TUNNELED DIALYSIS CATHETER performed by Zenon Gore MD at [address] Left 11/4/2022    LEFT DISTAL RADIUS OPEN REDUCTION INTERNAL FIXATION    ++SYNTHES++ performed by Braxton Webb MD at [address]       Immunization History:   Immunization History   Administered Date(s) Administered    COVID-19, PFIZER PURPLE top, DILUTE for use, (age 15 y+), 30mcg/0.3mL 01/05/2021, 01/26/2021       Active Problems:  Patient Active Problem List   Diagnosis Code    Neurologic gait dysfunction R26.9    Diabetes mellitus ([address] E11.9    Hypertension I10    Arthritis M19.90    Knee problem M25.9    Anemia due to stage 3 chronic kidney disease N18.30, D63.1    Primary osteoarthritis of both knees M17.0    Moderate protein-calorie malnutrition (HCC) E44.0    Pressure injury of sacral region, stage 4 (HCC) L89.154    Pressure injury of right hip, stage 3 (HCC) L89.213    Left bundle branch block I44.7    Moderate obesity E66.8    Failure to thrive in adult R62.7    Pressure injury of sacral region, stage 3 (HCC) L89.153    Decreased dorsalis pedis pulse R09.89    ESRD on hemodialysis (HCC) N18.6, Z99.2    Closed bicondylar fracture of distal femur, right, initial encounter ([address] S72.421A, S72.431A    Other fracture of right femur, initial encounter for closed fracture ([address] S72.8X1A    Closed fracture of distal ends of left radius and ulna S52.502A, S52.602A    Closed bicondylar fracture of distal end of right femur ([address] S72.421A, U41.007F    Metabolic encephalopathy I08.05    Acute respiratory failure with hypoxia ([facility]) J96.01    Pneumonia due to infectious organism, unspecified laterality, unspecified part of lung J18.9       Isolation/Infection:   Isolation            No Isolation          Patient Infection Status       Infection Onset Added Last Indicated Last Indicated By Review Planned Expiration Resolved Resolved By    None active    Resolved    COVID-19 (Rule Out) 23 COVID-19 & Influenza Combo (Ordered)   23 Rule-Out Test Resulted    COVID-19 22 COVID-19, Rapid   22     COVID-19 (Rule Out) 22 COVID-19, Rapid (Ordered)   22 Rule-Out Test Resulted    COVID-19 (Rule Out) 22 COVID-19, Rapid (Ordered)   22 Rule-Out Test Resulted    COVID-19 (Rule Out) 21 COVID-19 (Ordered)   21 Rule-Out Test Resulted    COVID-19 (Rule Out) 10/09/20 10/09/20 10/09/20 COVID-19 (Ordered)   10/09/20 Rule-Out Test Resulted    COVID-19 (Rule Out) 20 COVID-19 (Ordered)   20     VRE 20 Culture, Wound   10/12/20 Jasbir Santa RN    Enterococcus faecium Wound-Coccyx 20    MDRO (multi-drug resistant organism) 20 Culture, Wound   10/12/20 Jasbir Santa RN    COVID-19 (Rule Out) 20 COVID-19 (Ordered)   20 Rule-Out Test Resulted    DETECTED 2020    COVID-19 (Rule Out) 05/14/20 05/15/20 05/14/20 Covid-19 Ambulatory (Ordered)   20 Rule-Out Test Resulted    Detected 2020    COVID-19 (Rule Out) 20 COVID-19 (Ordered)   20 Rule-Out Test Resulted    DETECTED 2020    C-diff Rule Out 20 CLOSTRIDIUM DIFFICILE EIA (Ordered)   20 Tawnya Gonzalez RN    Does not meet criteria    MRSA 20 Culture, Wound   20 Park Bedoya RN    Wound buttock 5/3/20    COVID-19 20 COVID-19   20     Detected 2020, 2020, 2020, 5/3/2020    COVID-19 (Rule Out) 20 COVID-19 (Ordered)   20 Rule-Out Test Resulted    DETECTED 5/3/2020            Nurse Assessment:  Last Vital Signs: BP (!) 158/48   Pulse 72   Temp 98.2 °F (36.8 °C) (Oral)   Resp 18   Ht 5' 1\" (1.549 m)   Wt 190 lb (86.2 kg)   SpO2 97%   BMI 35.90 kg/m²     Last documented pain score (0-10 scale): Pain Level: 9  Last Weight:   Wt Readings from Last 1 Encounters:   01/24/23 190 lb (86.2 kg)     Mental Status:  alert    IV Access:  - None    Nursing Mobility/ADLs:  Walking   Dependent  Transfer  Dependent  Bathing  Dependent  Dressing  Dependent  Toileting  Dependent  Feeding  Independent  Med Admin  Assisted  Med Delivery   whole    Wound Care Documentation and Therapy:  Wound 12/06/21 Sacrum in fold (Active)   Number of days: 430       Wound 07/09/22 Coccyx (Active)   Wound Cleansed Cleansed with saline 02/09/23 1907   Wound Length (cm) 6 cm 02/09/23 1907   Wound Width (cm) 4.2 cm 02/09/23 1907   Wound Depth (cm) 3 cm 02/09/23 1907   Wound Surface Area (cm^2) 25.2 cm^2 02/09/23 1907   Change in Wound Size % (l*w) -188 02/09/23 1907   Wound Volume (cm^3) 75.6 cm^3 02/09/23 1907   Wound Healing % -476 02/09/23 1907   Wound Assessment Pink/red;Bleeding 02/09/23 1907   Drainage Amount Small 02/09/23 1907   Drainage Description Thin;Green 02/09/23 1907   Odor Mild 02/09/23 1907   Number of days: 216       Wound 10/25/22 Coccyx #1 (Active)   Number of days: 107       Wound 11/25/22 Coccyx Posterior (Active)   Number of days: 76       Wound 02/09/23 Pretibial Right Open red area on right lower shin (Active)   Wound Length (cm) 2 cm 02/09/23 1914   Wound Width (cm) 1.4 cm 02/09/23 1914   Wound Depth (cm) 0.4 cm 02/09/23 1914   Wound Surface Area (cm^2) 2.8 cm^2 02/09/23 1914   Wound Volume (cm^3) 1.12 cm^3 02/09/23 1914   Number of days: 0       Wound 02/09/23 Knee Right; Outer open red area right outer thigh (Active)   Wound Length (cm) 2.9 cm 02/09/23 1914   Wound Width (cm) 1.8 cm 02/09/23 1914   Wound Depth (cm) 0.9 cm 02/09/23 1914   Wound Surface Area (cm^2) 5.22 cm^2 02/09/23 1914   Wound Volume (cm^3) 4.698 cm^3 02/09/23 1914   Number of days: 0       Wound 02/09/23 Knee Anterior;Right Proximal second wound on outer thigh (Active)   Wound Length (cm) 1.5 cm 02/09/23 1917   Wound Width (cm) 1.4 cm 02/09/23 1917   Wound Depth (cm) 0.9 cm 02/09/23 1917   Wound Surface Area (cm^2) 2.1 cm^2 02/09/23 1917   Wound Volume (cm^3) 1.89 cm^3 02/09/23 1917   Number of days: 0       Incision 11/04/22 Femoral Anterior;Distal;Right (Active)   Number of days: 97       Incision 11/04/22 Radial Left (Active)   Number of days: 97       Incision 11/18/21 Brachial Anterior; Distal; Left (Active)   Number of days: 448        Elimination:  Continence: Bowel: Yes  Bladder: Yes  Urinary Catheter: None   Colostomy/Ileostomy/Ileal Conduit: No       Date of Last BM: 2/10/2022    Intake/Output Summary (Last 24 hours) at 2/10/2023 0852  Last data filed at 2/9/2023 1721  Gross per 24 hour   Intake 300 ml   Output 1800 ml   Net -1500 ml     I/O last 3 completed shifts: In: 300   Out: 1800     Safety Concerns:     None    Impairments/Disabilities:      None    Nutrition Therapy:  Current Nutrition Therapy:   - Oral Diet:  General    Routes of Feeding: Oral  Liquids: No Restrictions  Daily Fluid Restriction: no  Last Modified Barium Swallow with Video (Video Swallowing Test): not done    Treatments at the Time of Hospital Discharge:   Respiratory Treatments: ***  Oxygen Therapy:  is not on home oxygen therapy.   Ventilator:    - No ventilator support    Rehab Therapies: Physical Therapy  Weight Bearing Status/Restrictions: No weight bearing restrictions  Other Medical Equipment (for information only, NOT a DME order):  wheelchair and hospital bed  Other Treatments: ***    Patient's personal belongings (please select all that are sent with patient):  None    RN SIGNATURE:  Electronically signed by Claudene Meadows, RN on 2/10/23 at 6:44 PM EST    CASE MANAGEMENT/SOCIAL WORK SECTION    Inpatient Status Date: ***    Readmission Risk Assessment Score:  Readmission Risk              Risk of Unplanned Readmission:  29           Discharging to Facility/ Agency   Name: Address:  Phone:  Fax:    Dialysis Facility (if applicable)   Name:  Address:  Dialysis Schedule:  Phone:  Fax:    / signature: {Esignature:[id number]}    PHYSICIAN SECTION    Prognosis: Good    Condition at Discharge: Stable    Rehab Potential (if transferring to Rehab): Good    Recommended Labs or Other Treatments After Discharge: cbc cmp    Physician Certification: I certify the above information and transfer of Jong Jovita  is necessary for the continuing treatment of the diagnosis listed and that she requires [place] for greater 30 days.      Update Admission H&P: No change in H&P    PHYSICIAN SIGNATURE:  Electronically signed by Francisco Spear MD on 2/10/23 at 8:52 AM EST

## 2023-02-10 NOTE — PROGRESS NOTES
[facility]  Nephrology Progress Note    Patient's Name: Iveth Fulton    History of Present Illness from 2/9 consult note:    \"Marisela Koroma is a 67 y.o. female with a past medical history of COVID, ESRD on hemodialysis, hypertension, and diabetes mellitus. She presented to the ED on 2/8 with complaints of shortness of breath and vomiting. Vital signs on 2/8 include temperature 98.8, respiration 17, pulse 101, /98, and she was 93% SPO2. Lab data on 2/9 includes BUN 31, creatinine 5.4, WBC 17.7, and hemoglobin 10.7. She had a UA which showed many bacteria, large leukocyte esterase, moderate blood, 100 protein, negative nitrites. Chest x-ray on 2/9 showed hazy airspace opacities, concerning for pneumonia. We were consulted to see the patient for dialysis. Patient is known to our service and dialyzes at the dialysis den at [place]. At present, patient was seen and examined. She reports that she feels alright. She reports that she came in due to shortness of breath. She denies any current chest pain or shortness of breath. She denies any abdominal pain, nausea, vomiting, or diarrhea. She denies any headaches or dizziness. She denies any fevers or chills. \"    Subjective:    2/10: Patient was seen and examined. She reports that she feels great. She denies any chest pain or shortness of breath. She denies any nausea or vomiting.     PMH:    Past Medical History:   Diagnosis Date    Anemia in chronic kidney disease     Anxiety and depression     Arthritis     Chronic pain syndrome     COVID-19 05/2020    COVID-19     Decreased dorsalis pedis pulse 10/25/2022    Diabetes mellitus ([address]     Dysphagia, oral phase     ESRD on hemodialysis ([address] 10/25/2022    GERD (gastroesophageal reflux disease)     Hemodialysis patient ([facility]     M-W-F    Hyperkalemia     Hypertension     Hypertensive kidney disease with stage 4 chronic kidney disease (HCC)     Insomnia     Kidney stones     MDD (major depressive disorder)     Moderate protein-calorie malnutrition (HCC)     Morbid obesity (HCC)     Muscle weakness (generalized)     Obesity     Pressure injury of sacral region, stage 3 (HCC) 10/25/2022    Pressure ulcer of sacral region     Sacral pressure ulcer 08/03/2021    stage 4    Unspecified osteoarthritis, unspecified site     Weakness generalized        Patient Active Problem List   Diagnosis    Neurologic gait dysfunction    Diabetes mellitus ([address]    Hypertension    Arthritis    Knee problem    Anemia due to stage 3 chronic kidney disease    Primary osteoarthritis of both knees    Moderate protein-calorie malnutrition (HCC)    Pressure injury of sacral region, stage 4 (HCC)    Pressure injury of right hip, stage 3 (HCC)    Left bundle branch block    Moderate obesity    Failure to thrive in adult    Pressure injury of sacral region, stage 3 (HCC)    Decreased dorsalis pedis pulse    ESRD on hemodialysis ([address]    Closed bicondylar fracture of distal femur, right, initial encounter ([address]    Other fracture of right femur, initial encounter for closed fracture ([address]    Closed fracture of distal ends of left radius and ulna    Closed bicondylar fracture of distal end of right femur ([address]    Metabolic encephalopathy    Acute respiratory failure with hypoxia ([address]    Pneumonia due to infectious organism, unspecified laterality, unspecified part of lung       Diet:    ADULT DIET;  Regular  ADULT ORAL NUTRITION SUPPLEMENT; Breakfast, Lunch, Dinner; Standard High Calorie/High Protein Oral Supplement    Meds:     busPIRone  10 mg Oral Daily    [Held by provider] epoetin derek-epbx  10,000 Units SubCUTAneous Once per day on Mon Wed Fri    escitalopram  10 mg Oral Daily    gabapentin  300 mg Oral TID    metoprolol succinate  25 mg Oral Daily    midodrine  15 mg Oral Once per day on Mon Wed Fri    sodium chloride flush  5-40 mL IntraVENous 2 times per day    heparin (porcine)  5,000 Units SubCUTAneous 3 times per day ampicillin-sulbactam  3,000 mg IntraVENous Q24H        sodium chloride         Meds prn:     sodium chloride flush, sodium chloride, ondansetron **OR** ondansetron, polyethylene glycol, acetaminophen **OR** acetaminophen, HYDROcodone 5 mg - acetaminophen    Meds prior to admission:     No current facility-administered medications on file prior to encounter.      Current Outpatient Medications on File Prior to Encounter   Medication Sig Dispense Refill    aspirin 325 MG tablet Take 325 mg by mouth daily      ferric citrate (AURYXIA) 1  MG(Fe) TABS tablet Take 420 mg by mouth 3 times daily (with meals)      benzonatate (TESSALON) 100 MG capsule Take 100 mg by mouth 3 times daily as needed for Cough      bisacodyl (DULCOLAX) 10 MG suppository Place 10 mg rectally daily as needed for Constipation      clindamycin (CLEOCIN) 300 MG capsule Take 300 mg by mouth 4 times daily      sodium phosphate (FLEET) 7-19 GM/118ML Place 1 enema rectally once as needed (constipation)      glucagon 1 MG injection Inject 1 kit into the muscle as needed (hypoglycemia)      glucose (GLUTOSE) 40 % GEL Take 15 g by mouth as needed (hypoglycemia)      guaiFENesin 200 MG/5ML LIQD Take 200 mg by mouth every 6 hours as needed (cough)      insulin lispro, 1 Unit Dial, (HUMALOG/ADMELOG) 100 UNIT/ML SOPN Inject 0-10 Units into the skin 3 times daily (before meals) *Per Sliding Scale*      lactulose (CHRONULAC) 10 GM/15ML solution Take 10 g by mouth daily as needed (constipation)      busPIRone (BUSPAR) 10 MG tablet Take 10 mg by mouth daily      polyethylene glycol (GLYCOLAX) 17 g packet Take 17 g by mouth daily as needed for Constipation      oxyCODONE-acetaminophen (PERCOCET) 5-325 MG per tablet Take 1 tablet by mouth every 6 hours as needed for Pain.      epoetin derek-epbx (RETACRIT) 23669 UNIT/ML SOLN injection Inject 10,000 Units into the skin See Admin Instructions Given Tuesday,Thursday,Saturday      senna (SENOKOT) 8.6 MG tablet Take 2 tablets by mouth 2 times daily      traZODone (DESYREL) 50 MG tablet Take 25 mg by mouth nightly      acetaminophen (TYLENOL) 325 MG tablet Take 650 mg by mouth every 4 hours as needed for Pain or Fever      vitamin D (CHOLECALCIFEROL) 25 MCG (1000 UT) TABS tablet Take 1,000 Units by mouth daily      ondansetron (ZOFRAN) 4 MG tablet Take 4 mg by mouth every 12 hours as needed for Nausea or Vomiting      insulin glargine (LANTUS) 100 UNIT/ML injection vial Inject 6 Units into the skin nightly 10 mL 3    midodrine (PROAMATINE) 5 MG tablet Take 15 mg by mouth See Admin Instructions Given Monday,Wednesday,Friday      metoprolol succinate (TOPROL XL) 25 MG extended release tablet Take 1 tablet by mouth daily 30 tablet 3    escitalopram (LEXAPRO) 10 MG tablet Take 10 mg by mouth daily      gabapentin (NEURONTIN) 300 MG capsule Take 300 mg by mouth daily. Allergies:    Patient has no known allergies. Social History:     reports that she quit smoking about 11 years ago. Her smoking use included cigarettes. She has a 30.00 pack-year smoking history. She has never used smokeless tobacco. She reports that she does not drink alcohol and does not use drugs.     Family History:         Problem Relation Age of Onset    Cancer Sister      Physical Exam:      Patient Vitals for the past 24 hrs:   BP Temp Temp src Pulse Resp SpO2   02/10/23 0817 (!) 158/48 98.2 °F (36.8 °C) Oral 72 18 97 %   02/10/23 0045 (!) 105/36 97.9 °F (36.6 °C) Oral 77 18 96 %   02/09/23 2108 (!) 131/59 -- -- -- -- --   02/09/23 2048 (!) 89/53 98 °F (36.7 °C) Oral 81 18 95 %   02/09/23 2022 -- -- -- -- 18 --   02/09/23 1803 (!) 116/48 97.2 °F (36.2 °C) Oral 66 19 98 %   02/09/23 1721 (!) 125/52 97.7 °F (36.5 °C) -- 94 18 --   02/09/23 1630 99/62 -- -- 66 -- --   02/09/23 1600 (!) 113/30 -- -- 70 -- --   02/09/23 1530 (!) 130/54 -- -- 68 -- --   02/09/23 1500 (!) 124/26 -- -- 68 -- --   02/09/23 1400 (!) 171/50 -- -- 64 -- --   02/09/23 1343 (!) 147/50 -- -- 72 -- --   02/09/23 1338 (!) 138/36 97.3 °F (36.3 °C) -- 87 18 --   02/09/23 1219 (!) 160/67 -- -- 80 -- --   02/09/23 1000 (!) 135/42 -- -- 62 15 99 %   02/09/23 0945 -- -- -- 65 16 99 %   02/09/23 0930 -- -- -- 67 15 98 %   02/09/23 0915 110/63 -- -- 64 17 99 %   02/09/23 0900 -- -- -- 66 15 100 %   02/09/23 0845 97/67 -- -- 67 19 99 %   02/09/23 0830 (!) 86/52 -- -- 63 14 99 %           Intake/Output Summary (Last 24 hours) at 2/10/2023 0825  Last data filed at 2/9/2023 1721  Gross per 24 hour   Intake 300 ml   Output 1800 ml   Net -1500 ml       General: Awake, alert, no acute distress  Neck: No JVD noted  Lungs: Clear bilaterally upper, diminished to the bases bilaterally. Unlabored  CV: Regular rate and rhythm. No rub  Abd: Soft, nontender, nondistended. Active bowel sounds  Skin: Warm and dry. No rash on exposed extremities  Ext: No edema   Neuro: Awake, answers questions appropriately    Data:    Recent Labs     02/09/23 0047   WBC 17.7*   HGB 10.7*   HCT 33.3*   .0*            Recent Labs     02/09/23 0047      K 4.5      CO2 28   CREATININE 5.4*   BUN 31*   LABGLOM 8   GLUCOSE 208*   CALCIUM 9.9   MG 2.2         Vit D, 25-Hydroxy   Date Value Ref Range Status   11/11/2022 32 30 - 100 ng/mL Final     Comment:     <20 ng/mL. ........... [place] Bruch Deficient  20-30 ng/mL. ......... [place] Bruch Insufficient   ng/mL. ........ Jemma Bruch Sufficient  >100 ng/mL. .......... Jemma Bruch Toxic         PTH   Date Value Ref Range Status   11/06/2022 466 (H) 15 - 65 pg/mL Final       Recent Labs     02/09/23 0047   ALT 9   AST 14   ALKPHOS 69   BILITOT 0.2         Recent Labs     02/09/23 0047   LABALBU 2.9*         Ferritin   Date Value Ref Range Status   05/05/2021 767 ng/mL Final     Comment:     FERRITIN Reference Ranges:  Adult Males   20 - 60 years:    27 - 400 ng/mL  Adult females 16 - 61 years:    15 - 150 ng/mL  Adults greater than 60 years:   no established reference range  Pediatrics:                     no established reference range       Iron   Date Value Ref Range Status   05/05/2021 74 37 - 145 mcg/dL Final     TIBC   Date Value Ref Range Status   05/05/2021 116 (L) 250 - 450 mcg/dL Final       Vitamin B-12   Date Value Ref Range Status   05/05/2021 895 211 - 946 pg/mL Final       Folate   Date Value Ref Range Status   05/05/2021 >20.0 4.8 - 24.2 ng/mL Final       Lab Results   Component Value Date/Time    COLORU Yellow 02/09/2023 03:22 AM    NITRU Negative 02/09/2023 03:22 AM    GLUCOSEU 250 02/09/2023 03:22 AM    GLUCOSEU NEGATIVE 08/17/2011 10:30 PM    KETUA Negative 02/09/2023 03:22 AM    UROBILINOGEN 0.2 02/09/2023 03:22 AM    BILIRUBINUR Negative 02/09/2023 03:22 AM    BILIRUBINUR NEGATIVE 08/17/2011 10:30 PM       No results found for: MAGDA, CREURRAN, MACREATRATIO, OSMOU    No components found for: URIC    No results found for: LIPIDPAN    Assessment and Plans:    ESRD on HD  Outpatient at the dialysis den at [facility] HD while inpatient    2. Shortness of breath  CXR 2/9 hazy airspace opacities, concerning for pneumonia  On Unasyn  Pulmonology consulted    3. Anemia of chronic kidney disease  Hemoglobin target 10-12  Hemoglobin 10.7 on 2/9-at target  No ARUN as hemoglobin at target  Transfuse for hemoglobin<7  Monitor H&H    4.  Intermittent hypotension  On midodrine 15 mg oral daily MWF  Monitor BPs    5.  secondary hyperparathyroidism of renal origin   and phosphorus 5 on 2/6 in the outpatient setting  Check Phos    Monitor labs    Of note, no BMP available at the time of this note. Await BMP, notify renal of results    Michael Bruno, APRN - CNP    Patient seen and examined all key components of the physical performed independently , case discussed with NP, all pertinent labs and radiologic tests personally reviewed agree with above.       Cammie Bills MD

## 2023-02-10 NOTE — PROGRESS NOTES
Physical Therapy    Initial Assessment     Name: Zohaib Mishra  : 1950  MRN: [id number]      Date of Service: 2/10/2023    Evaluating PT: Arthur Lunsford, PT, DPT [id number]      Room #:  3481/9753-O  Diagnosis:  Acute respiratory failure with hypoxia ([facility] [address] [address] [J96.01]  Pneumonia of both lower lobes due to infectious organism [J18.9]  Pneumonia due to infectious organism, unspecified laterality, unspecified part of lung [J18.9]  PMHx/PSHx:   has a past medical history of Anemia in chronic kidney disease, Anxiety and depression, Arthritis, Chronic pain syndrome, COVID-19, COVID-19, Decreased dorsalis pedis pulse, Diabetes mellitus ([address] Dysphagia, oral phase, ESRD on hemodialysis ([address] GERD (gastroesophageal reflux disease), Hemodialysis patient ([address] Hyperkalemia, Hypertension, Hypertensive kidney disease with stage 4 chronic kidney disease ([address] Insomnia, Kidney stones, MDD (major depressive disorder), Moderate protein-calorie malnutrition ([address] Morbid obesity ([address] Muscle weakness (generalized), Obesity, Pressure injury of sacral region, stage 3 ([address] Pressure ulcer of sacral region, Sacral pressure ulcer, Unspecified osteoarthritis, unspecified site, and Weakness generalized. Precautions:  Fall Risk, WBAT R LE (per patient), Sacral and R LE wounds, TAPS    SUBJECTIVE:    Pt was admitted from [place] [address]. Pt lives with son in a 2 story house with ramped entry. Stair lift to second floor bed and bath. Pt was standing at parallel bars at rehab. Pt ambulated with [facility] [place] by [facility] prior to rehab. OBJECTIVE:   Initial Evaluation  Date: 2/10/23 Treatment Date: Short Term/ Long Term   Goals   AM-PAC 6 Clicks      Was pt agreeable to Eval/treatment? Yes     Does pt have pain? No current complaints of pain     Bed Mobility  Rolling: Mod A  Supine to sit: Max A  Sit to supine: Max A  Scooting: Max A to EOB  Rolling: SBA  Supine to sit: SBA  Sit to supine: SBA  Scooting: SBA   Transfers Sit to stand:  Mod A x2  Stand to sit: Mod A  Stand pivot: NT  Sit to stand: Min A  Stand to sit: Min A  Stand pivot: Mod A with [employer]   Ambulation   NT  >5 feet with [employer] with Mod A   Stair negotiation: ascended and descended NT  NA   ROM BUE: Refer to OT note  BLE: WFL     Strength BUE: Refer to OT note  BLE: NT     Balance Sitting EOB: Min A  Dynamic Standing: NT  Sitting EOB: SBA  Dynamic Standing: Mod A with [employer]     Pt is A & O x: 4 to person, place, month/year, and situation. Sensation: Pt denies numbness and tingling of extremities. Edema: Unremarkable. Patient education  Pt educated on PT role in acute care setting. Patient response to education:   Pt verbalized understanding Pt demonstrated skill Pt requires further education in this area   Yes NA No     ASSESSMENT:    Conditions Requiring Skilled Therapeutic Intervention:    [x]Decreased strength     []Decreased ROM  [x]Decreased functional mobility  [x]Decreased balance   [x]Decreased endurance   []Decreased posture  []Decreased sensation  []Decreased coordination   []Decreased vision  []Decreased safety awareness   []Increased pain       Comments:    Pt was in bed upon room entry; agreeable to PT evaluation. Pt reports Dr. Mandujano Bending cleared for WBAT R LE. Pt required heavy assist for transfer to EOB. Sitting balance was Fair. Pt was able to stand from EOB for about 1 minute. Pt fatigued easily and was seated. Pt was assisted back to bed quickly due to buttocks sliding near edge. Pt was assisted back to bed and rolled/repositioned. Wound care present to assess/treat pt as she rolled. Pt was left in bed with all needs met at conclusion of session. Treatment:  Patient practiced and was instructed in the following treatment:    Therapeutic activities:  Bed mobility: Pt was cued for technique during bed mobility transfers. Transfers: Pt was cued for hand placement during sit <> stand transfers. Sitting EOB: Pt sat at EOB for 3+ minutes (sitting balance).   Standing: Pt stood at EOB for 1+ minute (standing balance, endurance). Vitals and symptoms were closely monitored throughout session. Skillful positioning in bed to protect skin/joint integrity. Pt's/family goals:  1. To return to rehab. Prognosis is Fair for reaching above PT goals. Patient and or family understand(s) diagnosis, prognosis, and plan of care. Yes. PHYSICAL THERAPY PLAN OF CARE:    PT POC is established based on physician order and patient diagnosis     Referring provider/PT Order:    Start   Ordering Provider    02/09/23 1415  PT eval and treat  Start:  02/09/23 1415,   End:  02/09/23 1415,   ONE TIME,   Standing Count:  1 Occurrences,   R         Ruthie Nogueira MD      Diagnosis:  Acute respiratory failure with hypoxia ([facility] [address] [J96.01]  Pneumonia of both lower lobes due to infectious organism [J18.9]  Pneumonia due to infectious organism, unspecified laterality, unspecified part of lung [J18.9]  Specific instructions for next treatment:  Progress activity. Current Treatment Recommendations:     [x] Strengthening to improve independence with functional mobility   [] ROM to improve independence with functional mobility   [x] Balance Training to improve static/dynamic balance and to reduce fall risk  [x] Endurance Training to improve activity tolerance during functional mobility   [x] Transfer Training to improve safety and independence with all functional transfers   [x] Gait Training to improve gait mechanics, endurance and assess need for appropriate assistive device  [] Stair Training in preparation for safe discharge home and/or into the community   [x] Positioning to prevent skin breakdown and contractures  [x] Safety and Education Training   [x] Patient/Caregiver Education   [] HEP  [] Other     PT long term treatment goals are located in above grid    Frequency of treatments: 2-5x/week x 1-2 weeks.     Time in  1020  Time out  1040    Total Treatment Time  8 minutes     Evaluation Time includes thorough review of current medical information, gathering information on past medical history/social history and prior level of function, completion of standardized testing/informal observation of tasks, assessment of data and education on plan of care and goals.     CPT codes:  [x] Low Complexity PT evaluation 27150  [] Moderate Complexity PT evaluation 34619  [] High Complexity PT evaluation 38530  [] PT Re-evaluation 88675  [] Gait training 89085 0 minutes  [] Manual therapy 75925 0 minutes  [x] Therapeutic activities 48574 8 minutes  [] Therapeutic exercises 11835 0 minutes  [] Neuromuscular reeducation 27605 0 minutes     Duke Fischer, PT, DPT  [id number]

## 2023-02-10 NOTE — PROGRESS NOTES
Lise Good M.D.,[facility]  Kana Prince D.O., F.QI.OTYREL., Urszula Bailon M.D. Ariadne Ibarra M.D. Ирина Martinez D.O. Daily Pulmonary Progress Note    Patient:  Corona Garcia 67 y.o. female MRN: [id number]     Date of Service: 2/10/2023      Synopsis     We are following patient for hypoxia    \"CC\" SOB    Code status: Full      Subjective      Patient was seen and examined on room air. She states she is feeling wonderful. Chest CT reviewed and there is no indication of pneumonia. There does seem be bilateral effusions with left greater than right but that is not of much concern being that the patient is on hemodialysis. There also is some left lower lobe atelectasis, again of not much concern. Review of Systems:  Constitutional: Denies fever, weight loss, night sweats, and fatigue  Skin: Denies pigmentation, dark lesions, and rashes   HEENT: Denies hearing loss, tinnitus, ear drainage, epistaxis, sore throat, and hoarseness. Cardiovascular: Denies palpitations, chest pain, and chest pressure. Respiratory: Denies cough, dyspnea at rest, hemoptysis, apnea, and choking.   Gastrointestinal: Patient does admit to having upset stomach every time she gets her antibiotic clindamycin   genitourinary: Denies dysuria, frequency, urgency or hematuria  Musculoskeletal: Patient has lower extremity weakness  Neurological: Denies dizziness, vertigo, headache, and focal weakness    24-hour events:  Chest CT    Objective   Vitals: BP (!) 158/48   Pulse 72   Temp 98.2 °F (36.8 °C) (Oral)   Resp 18   Ht 5' 1\" (1.549 m)   Wt 190 lb (86.2 kg)   SpO2 97%   BMI 35.90 kg/m²     I/O:    Intake/Output Summary (Last 24 hours) at 2/10/2023 1256  Last data filed at 2/10/2023 1051  Gross per 24 hour   Intake 480 ml   Output 1800 ml   Net -1320 ml                        CURRENT MEDS :  Scheduled Meds:   miconazole   Topical BID    busPIRone  10 mg Oral Daily    [Held by provider] epoetin derek-epbx  10,000 Units SubCUTAneous Once per day on Mon Wed Fri    escitalopram  10 mg Oral Daily    gabapentin  300 mg Oral TID    metoprolol succinate  25 mg Oral Daily    midodrine  15 mg Oral Once per day on Mon Wed Fri    sodium chloride flush  5-40 mL IntraVENous 2 times per day    heparin (porcine)  5,000 Units SubCUTAneous 3 times per day    ampicillin-sulbactam  3,000 mg IntraVENous Q24H       Physical Exam:  General: The patient is lying in bed comfortably without any distress. Breathing is not labored  HEENT: Pupils are equal round and reactive to light, there are no oral lesions and no post-nasal drip   Neck: supple without adenopathy  Cardiovascular: regular rate and rhythm without murmur or gallop  Respiratory: Clear to auscultation bilaterally without wheezing or crackles. Air entry is symmetric  Abdomen: soft, non-tender, non-distended, normal bowel sounds  Extremities: warm, no edema, no clubbing  Skin:  stage III decubiti ulcer on her back  Neurologic: Mental status: Alert and Oriented X3 . Pertinent/ New Labs and Imaging Studies     Imaging Personally Reviewed:    Non contrast Chest CT 2/9/23:    Impression   Small bilateral pleural effusions with posterior bibasilar compressive   atelectasis/collapse. Old granulomatous disease of the mediastinum, liver, spleen. Cardiomegaly. Atherosclerotic, aneurysmal thoracic aorta, as detailed above. CXR 2/9/23:        FINDINGS:   The cardiomediastinal silhouette is stable. There are hazy bilateral   airspace opacities, greater in the right lower lobe. No pneumothorax or   large pleural effusion. Impression   Hazy airspace opacities, concerning for pneumonia. ECHO 2/8/22:  Summary   Technically limited study with off axis images. Left ventricular internal dimensions were normal in diastole and systole. Abnormal (paradoxical) motion consistent with conduction abnormality. No regional wall motion abnormalities seen. The left atrium is moderately dilated. Focal calcification mitral valve leaflets and subvalvular structures. Severe mitral annular calcification. Mild mitral regurgitation is present. Moderate functional mitral stenosis . The aortic valve appears mildly sclerotic. Labs:  Lab Results   Component Value Date/Time    WBC 17.7 02/09/2023 12:47 AM    HGB 10.7 02/09/2023 12:47 AM    HCT 33.3 02/09/2023 12:47 AM    .0 02/09/2023 12:47 AM    MCH 35.7 02/09/2023 12:47 AM    MCHC 32.1 02/09/2023 12:47 AM    RDW 15.2 02/09/2023 12:47 AM     02/09/2023 12:47 AM    MPV 10.4 02/09/2023 12:47 AM     Lab Results   Component Value Date/Time     02/09/2023 12:47 AM    K 4.5 02/09/2023 12:47 AM    K 3.8 11/30/2022 07:25 AM     02/09/2023 12:47 AM    CO2 28 02/09/2023 12:47 AM    BUN 31 02/09/2023 12:47 AM    CREATININE 5.4 02/09/2023 12:47 AM    LABALBU 2.9 02/09/2023 12:47 AM    LABALBU 4.1 10/12/2011 09:59 AM    CALCIUM 9.9 02/09/2023 12:47 AM    GFRAA 7 10/12/2022 03:49 AM    LABGLOM 8 02/09/2023 12:47 AM     Lab Results   Component Value Date/Time    PROTIME 10.6 11/23/2022 04:26 AM    PROTIME 15.3 06/25/2011 07:00 PM    INR 1.0 11/23/2022 04:26 AM     No results for input(s): PROBNP in the last 72 hours. No results for input(s): PROCAL in the last 72 hours. This SmartLink has not been configured with any valid records. Micro:  No results for input(s): CULTRESP in the last 72 hours. No results for input(s): LABGRAM in the last 72 hours. No results for input(s): LEGUR in the last 72 hours. No results for input(s): STREPNEUMAGU in the last 72 hours. No results for input(s): LP1UAG in the last 72 hours.        Assessment:    Transient hypoxia-resolved  Bilateral pleural effusions, left greater than right  End-stage renal disease on hemodialysis  Severe deconditioning  Stage III decubiti ulcer  Chronic macrocytic anemia    Plan:   Continue to monitor on room air-apply supplemental oxygen to maintain SpO2 greater than 92%  Stop unasyn  Local wound care  iHD per Nephrology  PT/OT  Patient is stable from a pulmonary standpoint and is to discharge    This plan of care was reviewed in collaboration with Dr. Jam Rabago    Electronically signed by PABLO Jaramillo CNP on 2/10/2023 at 12:56 PM        I personally saw, examined and provided care for the patient. Radiographs, labs and medication list were reviewed by me independently. CT scan shows small left slightly larger than right bibasilar pleural effusions and left lower lobe atelectasis. Patient is on room air has no respiratory complaints. Does not need any intervention at this point. Might have had more aspiration pneumonitis than actual pneumonia can stop the Unasyn. I spoke with bedside nursing, therapists and consultants. The case was discussed in detail and plans for care were established. Review of CNP documentation was conducted and revisions were made as appropriate. I agree with the above documented exam, problem list and plan of care.      Army Agustín MD

## 2023-02-10 NOTE — PROGRESS NOTES
OCCUPATIONAL THERAPY INITIAL EVALUATION     [address] [address] [place]      Date:2/10/2023                                                  Patient Name: Melanie García  MRN: [id number]  : 1950  Room: [address] [place] [address]    Evaluating OT: GORDO Cummings, OTR/L [id number]  Referring Koby Benitez MD  Specific Provider Orders: OT eval and treat   Recommended Adaptive Equipment:  TBD     Diagnosis: respiratory failure   Surgery: none   Pertinent Medical History: DM, HTN, covid, obesity   Precautions:  Fall Risk, WBAT RLE (per pt report due to femur fx from 2022), HD    Assessment of current deficits   [x] Functional mobility  [x]ADLs  [x] Strength               [x]Cognition   [x] Functional transfers   [x] IADLs         [x] Safety Awareness   [x]Endurance   [] Fine Coordination              [x] Balance      [] Vision/perception   [x]Sensation    []Gross Motor Coordination  [] ROM  [] Delirium                   [] Motor Control     OT PLAN OF CARE   OT POC based on physician orders, patient diagnosis and results of clinical assessment    Frequency/Duration: 2-4 days/wk for 2 weeks PRN   Specific OT Treatment to include:   * Instruction/training on adapted ADL techniques and AE recommendations to increase functional independence within precautions       * Training on energy conservation strategies, correct breathing pattern and techniques to improve independence/tolerance for self-care routine  * Functional transfer/mobility training/DME recommendations for increased independence, safety, and fall prevention  * Patient/Family education to increase follow through with safety techniques and functional independence  * Recommendation of environmental modifications for increased safety with functional transfers/mobility and ADLs  * Therapeutic exercise to improve motor endurance, ROM, and functional strength for ADLs/functional transfers  * Therapeutic activities to facilitate/challenge dynamic balance, stand tolerance for increased safety and independence with ADLs  * Neuro-muscular re-education: facilitation of righting/equilibrium reactions, midline orientation, scapular stability/mobility, normalization of muscle tone, and facilitation of volitional active controled movement    Home Living: Pt presents from [facility]. Pt required assist with ADLs and was just beginning to stand within the parallel bars. Pain Level: buttocks  Cognition: A&O: 3/4; Follows 1 step directions, with repetition and increased time   Memory:  good    Sequencing:  good    Problem solving:  fair    Judgement/safety:  fair     Functional Assessment:  AM-PAC Daily Activity Raw Score: 12/24   Initial Eval Status  Date: 2/10/23 Treatment Status  Date: STGs=LTGs  Time Frame: 10-14 days   Feeding SUP   Set-up   Grooming Min A (upright in bed)  SBA   UB Dressing Mod A   Min A   LB Dressing Dep (assist with socks)  Max A    Bathing Max A (simulated)  Mod A    Toileting dep  Max A   Bed Mobility  Log roll: Mod A (along with PT)  Supine to sit: Mod A   Sit to supine: Max A    Log roll Min A  Supine to sit: Mod A   Sit to supine: Mod A   Functional Transfers Sit to stand:max x 2   Stand to sit:max x 2  Commode: NT  Max A   Functional Mobility NT  TBA   Balance Sitting:   Standing: Max x 2     Activity Tolerance Fair+     Visual/  Perceptual Glasses: none              UE ROM: BUE: elbow flex WFL, shoulder flex grossly 90'  Strength: RUE: grossly 3/5 LUE: grossly 3/5   Strength: B WFL  Fine Motor Coordination:  fair    Hearing: WFL  Sensation:  No c/o numbness/tingling   Tone:  WFL  Edema: none noted                            Comments:Cleared by RN to see pt. Upon arrival, patient supine in bed and agreeable to OT session. At end of session, patient supine in bed with call light and phone within reach, all lines and tubes intact.  Pt would benefit from continued OT to increase functional independence and quality of life. Treatment: Pt required vc's and physical assist for proper technique/safety with hand placement/body mechanics/posture for bed mobility/ADLs/functional tranfers. Pt required vc's for sequencing/initiation of ADLs/functional transfers. Pt able to  sit EOB ~10 mins to increase core strength/balance/activity tolerance for ease with ADLs. Pt required 2 person assist to reposition to [facility]. Pt required rest breaks during session. Pt appeared to have tolerated session well and appears motivated/cooperative/pleasant . Pt instructed on use of call light for assistance and fall prevention. Pt demo'ing fair understanding of education provided. Continue to educate. Eval Complexity: moderate  History: Expanded chart review of medical records and additional review of physical, cognitive, or psychosocial history related to current functional performance  Exam: 3+ performance deficits  Assistance/Modification: mod/max assistance or modifications required to perform tasks. May have comorbidities that affect occupational performance. Rehab Potential: Good for established goals, pt. assisted in establishment of goals. LTG: maximize independence with ADLs to return to PLOF    Patient  instructed on diagnosis, prognosis/goals and plan of care. Demonstrated fair understanding. [] Malnutrition indicators have been identified and nursing has been notified to ensure a dietitian consult is ordered. Evaluation time includes thorough review of current medical information, gathering information on past medical & social history & PLOF, completion of standardized testing, informal observation of tasks, consultation with other medical professions/disciplines, assessment of data & development of POC/goals.      Time In: 1020       Time Out: 1045     Total treatment time: 15       Treatment Charges: Mins Units   OT Eval Low 38219     OT Eval Medium 72592 X    OT Eval High 52237     OT Re-Eval [id number]     Ther Ex [address] [facility] [address]       ADL/Home Mgt 50082 15 1   Neuro Re-ed 15126       Group Therapy        Orthotic manage/training  [id number]       Non-Billable Time           Ronni Romo, OTR/L [id number]

## 2023-02-10 NOTE — FLOWSHEET NOTE
Inpatient Wound Care (Initial consult) 9573L    Admit Date: 2/8/2023 11:42 PM    Reason for consult:  Sacrum    Significant history: Per H&P    CHIEF COMPLAINT:  Hypoxia, shortness of breath. HISTORY OF PRESENTING ILLNESS:  A 76-year-old residing at the nursing  home, chronic dialysis patient, also has chronic sacral decubitus, who  presented to the Emergency Room due to shortness of breath. It appears  that she had an emesis yesterday, which possibly causes could be  aspiration, but she became short of breath and hypoxic and was brought  to the Emergency Room and is being admitted for the same. Presently,  she appears to be comfortable on 2-L nasal cannula. She denies any  chest pain or shortness of breath at this time. She does receive  dialysis 3 days a week. Also, of note it appeared she had been on  antibiotics for UTI at the nursing home. She denied any fever or chills  prior to this. Currently denies nausea at this point or abdominal pain.     Findings:     02/10/23 1045   Skin Integumentary    Skin Integrity   (dry flaky)   Location bilateral feet   Skin Integrity Site 2   Skin Integrity Location 2 Excoriation   Location 2 abdominal fold   Skin Integrity Site 3   Skin Integrity Location 3 Rash;Redness    Location 3 groin   Skin Integrity Site 4   Skin Integrity Location 4 Ecchymosis   Location 4 BUE, abdomen   Wound 07/09/22 Coccyx   Date First Assessed/Time First Assessed: 07/09/22 0345   Present on Hospital Admission: Yes  Primary Wound Type: Pressure Injury  Location: Coccyx   Wound Image    Wound Etiology Pressure Stage 4   Dressing Status New dressing applied   Wound Cleansed Cleansed with saline   Dressing/Treatment Alginate;ABD;Dry dressing   Wound Length (cm) 6.7 cm   Wound Width (cm) 4 cm   Wound Depth (cm) 2.5 cm   Wound Surface Area (cm^2) 26.8 cm^2   Change in Wound Size % (l*w) -206.29   Wound Volume (cm^3) 67 cm^3   Wound Healing % -410   Wound Assessment Pink/red  (yellow)   Drainage Amount Small   Drainage Description Serosanguinous   Odor None   Carmen-wound Assessment Maceration   Wound 02/09/23 Knee Right   Date First Assessed/Time First Assessed: 02/09/23 1912   Present on Hospital Admission: Yes  Location: Knee  Wound Location Orientation: Right   Wound Image    Wound Etiology Pressure Stage 3   Dressing Status New dressing applied   Wound Cleansed Cleansed with saline   Dressing/Treatment ABD; Alginate;Dry dressing;Roll gauze   Wound Length (cm) 1.8 cm   Wound Width (cm) 1.4 cm   Wound Depth (cm) 0.4 cm   Wound Surface Area (cm^2) 2.52 cm^2   Change in Wound Size % (l*w) 10   Wound Volume (cm^3) 1.008 cm^3   Wound Healing % 10   Wound Assessment Pink/red  (yellow)   Drainage Amount None   Odor None   Carmen-wound Assessment Dry/flaky   Wound 02/09/23 Leg Right;Lateral   Date First Assessed/Time First Assessed: 02/09/23 1912   Present on Hospital Admission: Yes  Primary Wound Type: Pressure Injury  Location: Leg  Wound Location Orientation: Right;Lateral   Wound Image    Wound Etiology Pressure Stage 3   Dressing Status New dressing applied   Wound Cleansed Cleansed with saline   Dressing/Treatment ABD; Alginate;Dry dressing;Roll gauze   Wound Length (cm) 2.2 cm   Wound Width (cm) 1.4 cm   Wound Depth (cm) 1.2 cm   Wound Surface Area (cm^2) 3.08 cm^2   Change in Wound Size % (l*w) 41   Wound Volume (cm^3) 3.696 cm^3   Wound Healing % 21   Undermining Starts ___ O'Clock 5   Undermining Ends___ O'Clock 6   Undermining Maxium Distance (cm) 1.5   Wound Assessment Pink/red  (yellow)   Drainage Amount Small   Drainage Description Serosanguinous   Odor None   Carmen-wound Assessment Dry/flaky     **Informed Consent**    The patient has given verbal consent to have photos taken of wounds and inserted into their chart as part of their permanent medical record for purposes of documentation, treatment management and/or medical review.    All Images taken on 2/10/23 of patient name: Dasha Peaks were transmitted and stored on secured [employer] located within [facility] by a registered [employer] Mobile Application Device. Impression:  Coccyx: stage 4  Right knee and right lateral leg; stage 3    Plan:Opticell, 4x4, abd pad  Opticell, 4x4, abd pad, kerlix  Antifungal powder  Low air loss module  TAPS  Heel protectors  Patient will need continued preventative care.       Yousif Garcia RN 2/10/2023 11:56 AM

## 2023-02-13 LAB
ALBUMIN SERPL-MCNC: 3.3 G/DL (ref 3.5–5.2)
ALP BLD-CCNC: 73 U/L (ref 35–104)
ALT SERPL-CCNC: <5 U/L (ref 0–32)
ANION GAP SERPL CALCULATED.3IONS-SCNC: 15 MMOL/L (ref 7–16)
AST SERPL-CCNC: 16 U/L (ref 0–31)
BILIRUB SERPL-MCNC: <0.2 MG/DL (ref 0–1.2)
BUN BLDV-MCNC: 45 MG/DL (ref 6–23)
C-REACTIVE PROTEIN: 3.1 MG/DL (ref 0–0.4)
CALCIUM SERPL-MCNC: 9.8 MG/DL (ref 8.6–10.2)
CHLORIDE BLD-SCNC: 103 MMOL/L (ref 98–107)
CO2: 22 MMOL/L (ref 22–29)
CREAT SERPL-MCNC: 6.9 MG/DL (ref 0.5–1)
GFR SERPL CREATININE-BSD FRML MDRD: 6 ML/MIN/1.73
GLUCOSE BLD-MCNC: 201 MG/DL (ref 74–99)
HCT VFR BLD CALC: 34.3 % (ref 34–48)
HEMOGLOBIN: 10.9 G/DL (ref 11.5–15.5)
MCH RBC QN AUTO: 34.6 PG (ref 26–35)
MCHC RBC AUTO-ENTMCNC: 31.8 % (ref 32–34.5)
MCV RBC AUTO: 108.9 FL (ref 80–99.9)
PDW BLD-RTO: 15.8 FL (ref 11.5–15)
PLATELET # BLD: 215 E9/L (ref 130–450)
PMV BLD AUTO: 11.4 FL (ref 7–12)
POTASSIUM SERPL-SCNC: 5.8 MMOL/L (ref 3.5–5)
RBC # BLD: 3.15 E12/L (ref 3.5–5.5)
SEDIMENTATION RATE, ERYTHROCYTE: 63 MM/HR (ref 0–20)
SODIUM BLD-SCNC: 140 MMOL/L (ref 132–146)
TOTAL PROTEIN: 6.6 G/DL (ref 6.4–8.3)
WBC # BLD: 7.4 E9/L (ref 4.5–11.5)

## 2023-02-14 ENCOUNTER — OUTSIDE SERVICES (OUTPATIENT)
Dept: PRIMARY CARE CLINIC | Age: 73
End: 2023-02-14
Payer: MEDICARE

## 2023-02-14 DIAGNOSIS — L89.43 PRESSURE INJURY OF CONTIGUOUS REGION INVOLVING BACK AND RIGHT HIP, STAGE 3 (HCC): ICD-10-CM

## 2023-02-14 DIAGNOSIS — N18.9 ANEMIA IN CHRONIC KIDNEY DISEASE, UNSPECIFIED CKD STAGE: ICD-10-CM

## 2023-02-14 DIAGNOSIS — Z99.2 ESRD ON HEMODIALYSIS (HCC): Primary | ICD-10-CM

## 2023-02-14 DIAGNOSIS — N18.6 ESRD ON HEMODIALYSIS (HCC): Primary | ICD-10-CM

## 2023-02-14 DIAGNOSIS — Z79.4 TYPE 2 DIABETES MELLITUS WITH HYPERGLYCEMIA, WITH LONG-TERM CURRENT USE OF INSULIN (HCC): ICD-10-CM

## 2023-02-14 DIAGNOSIS — E44.0 MODERATE PROTEIN-CALORIE MALNUTRITION (HCC): ICD-10-CM

## 2023-02-14 DIAGNOSIS — I15.1 HYPERTENSION SECONDARY TO OTHER RENAL DISORDERS: ICD-10-CM

## 2023-02-14 DIAGNOSIS — N28.89 HYPERTENSION SECONDARY TO OTHER RENAL DISORDERS: ICD-10-CM

## 2023-02-14 DIAGNOSIS — M17.0 PRIMARY OSTEOARTHRITIS OF BOTH KNEES: ICD-10-CM

## 2023-02-14 DIAGNOSIS — R62.7 FAILURE TO THRIVE IN ADULT: ICD-10-CM

## 2023-02-14 DIAGNOSIS — E66.8 MODERATE OBESITY: ICD-10-CM

## 2023-02-14 DIAGNOSIS — E11.65 TYPE 2 DIABETES MELLITUS WITH HYPERGLYCEMIA, WITH LONG-TERM CURRENT USE OF INSULIN (HCC): ICD-10-CM

## 2023-02-14 DIAGNOSIS — D63.1 ANEMIA IN CHRONIC KIDNEY DISEASE, UNSPECIFIED CKD STAGE: ICD-10-CM

## 2023-02-14 LAB
BLOOD CULTURE, ROUTINE: NORMAL
CULTURE, BLOOD 2: NORMAL

## 2023-02-14 PROCEDURE — 99309 SBSQ NF CARE MODERATE MDM 30: CPT

## 2023-02-15 ENCOUNTER — HOSPITAL ENCOUNTER (INPATIENT)
Age: 73
LOS: 3 days | Discharge: NURSING, REHAB OR LONG-TERM CARE | End: 2023-02-18
Attending: EMERGENCY MEDICINE | Admitting: FAMILY MEDICINE
Payer: MEDICARE

## 2023-02-15 ENCOUNTER — APPOINTMENT (OUTPATIENT)
Dept: GENERAL RADIOLOGY | Age: 73
End: 2023-02-15
Payer: MEDICARE

## 2023-02-15 ENCOUNTER — APPOINTMENT (OUTPATIENT)
Dept: CT IMAGING | Age: 73
End: 2023-02-15
Payer: MEDICARE

## 2023-02-15 ENCOUNTER — TELEPHONE (OUTPATIENT)
Dept: OTHER | Facility: CLINIC | Age: 73
End: 2023-02-15

## 2023-02-15 DIAGNOSIS — J18.9 PNEUMONIA OF RIGHT UPPER LOBE DUE TO INFECTIOUS ORGANISM: Primary | ICD-10-CM

## 2023-02-15 DIAGNOSIS — R53.83 OTHER FATIGUE: ICD-10-CM

## 2023-02-15 DIAGNOSIS — R41.82 ALTERED MENTAL STATUS, UNSPECIFIED ALTERED MENTAL STATUS TYPE: ICD-10-CM

## 2023-02-15 DIAGNOSIS — R09.02 HYPOXIA: ICD-10-CM

## 2023-02-15 LAB
ALBUMIN SERPL-MCNC: 3.2 G/DL (ref 3.5–5.2)
ALP BLD-CCNC: 76 U/L (ref 35–104)
ALT SERPL-CCNC: 7 U/L (ref 0–32)
ANION GAP SERPL CALCULATED.3IONS-SCNC: 10 MMOL/L (ref 7–16)
AST SERPL-CCNC: 15 U/L (ref 0–31)
BACTERIA: ABNORMAL /HPF
BASOPHILS ABSOLUTE: 0.1 E9/L (ref 0–0.2)
BASOPHILS RELATIVE PERCENT: 1.2 % (ref 0–2)
BILIRUB SERPL-MCNC: 0.4 MG/DL (ref 0–1.2)
BILIRUBIN URINE: NEGATIVE
BLOOD, URINE: NEGATIVE
BUN BLDV-MCNC: 23 MG/DL (ref 6–23)
CALCIUM SERPL-MCNC: 9.7 MG/DL (ref 8.6–10.2)
CHLORIDE BLD-SCNC: 102 MMOL/L (ref 98–107)
CLARITY: CLEAR
CO2: 30 MMOL/L (ref 22–29)
COLOR: YELLOW
CREAT SERPL-MCNC: 4.7 MG/DL (ref 0.5–1)
EKG ATRIAL RATE: 82 BPM
EKG P AXIS: 77 DEGREES
EKG P-R INTERVAL: 274 MS
EKG Q-T INTERVAL: 462 MS
EKG QRS DURATION: 140 MS
EKG QTC CALCULATION (BAZETT): 539 MS
EKG R AXIS: 45 DEGREES
EKG T AXIS: 93 DEGREES
EKG VENTRICULAR RATE: 82 BPM
EOSINOPHILS ABSOLUTE: 0.17 E9/L (ref 0.05–0.5)
EOSINOPHILS RELATIVE PERCENT: 2 % (ref 0–6)
EPITHELIAL CELLS, UA: ABNORMAL /HPF
GFR SERPL CREATININE-BSD FRML MDRD: 9 ML/MIN/1.73
GLUCOSE BLD-MCNC: 107 MG/DL (ref 74–99)
GLUCOSE URINE: 100 MG/DL
HCT VFR BLD CALC: 34.8 % (ref 34–48)
HEMOGLOBIN: 11 G/DL (ref 11.5–15.5)
IMMATURE GRANULOCYTES #: 0.03 E9/L
IMMATURE GRANULOCYTES %: 0.4 % (ref 0–5)
INFLUENZA A: NOT DETECTED
INFLUENZA B: NOT DETECTED
KETONES, URINE: NEGATIVE MG/DL
LACTIC ACID: 1.2 MMOL/L (ref 0.5–2.2)
LEUKOCYTE ESTERASE, URINE: NEGATIVE
LIPASE: 8 U/L (ref 13–60)
LYMPHOCYTES ABSOLUTE: 1.32 E9/L (ref 1.5–4)
LYMPHOCYTES RELATIVE PERCENT: 15.8 % (ref 20–42)
MCH RBC QN AUTO: 34.2 PG (ref 26–35)
MCHC RBC AUTO-ENTMCNC: 31.6 % (ref 32–34.5)
MCV RBC AUTO: 108.1 FL (ref 80–99.9)
MONOCYTES ABSOLUTE: 1.1 E9/L (ref 0.1–0.95)
MONOCYTES RELATIVE PERCENT: 13.2 % (ref 2–12)
NEUTROPHILS ABSOLUTE: 5.62 E9/L (ref 1.8–7.3)
NEUTROPHILS RELATIVE PERCENT: 67.4 % (ref 43–80)
NITRITE, URINE: NEGATIVE
PDW BLD-RTO: 15.5 FL (ref 11.5–15)
PH UA: 8.5 (ref 5–9)
PLATELET # BLD: 276 E9/L (ref 130–450)
PMV BLD AUTO: 11 FL (ref 7–12)
POTASSIUM SERPL-SCNC: 4.8 MMOL/L (ref 3.5–5)
PROCALCITONIN: 1.66 NG/ML (ref 0–0.08)
PROTEIN UA: 100 MG/DL
RBC # BLD: 3.22 E12/L (ref 3.5–5.5)
RBC UA: ABNORMAL /HPF (ref 0–2)
SARS-COV-2 RNA, RT PCR: NOT DETECTED
SODIUM BLD-SCNC: 142 MMOL/L (ref 132–146)
SPECIFIC GRAVITY UA: 1.01 (ref 1–1.03)
TOTAL PROTEIN: 6.9 G/DL (ref 6.4–8.3)
TROPONIN, HIGH SENSITIVITY: 103 NG/L (ref 0–9)
TROPONIN, HIGH SENSITIVITY: 110 NG/L (ref 0–9)
UROBILINOGEN, URINE: 0.2 E.U./DL
WBC # BLD: 8.3 E9/L (ref 4.5–11.5)
WBC UA: ABNORMAL /HPF (ref 0–5)

## 2023-02-15 PROCEDURE — 2060000000 HC ICU INTERMEDIATE R&B

## 2023-02-15 PROCEDURE — 70450 CT HEAD/BRAIN W/O DYE: CPT

## 2023-02-15 PROCEDURE — 93005 ELECTROCARDIOGRAM TRACING: CPT | Performed by: STUDENT IN AN ORGANIZED HEALTH CARE EDUCATION/TRAINING PROGRAM

## 2023-02-15 PROCEDURE — 83690 ASSAY OF LIPASE: CPT

## 2023-02-15 PROCEDURE — 87088 URINE BACTERIA CULTURE: CPT

## 2023-02-15 PROCEDURE — 74176 CT ABD & PELVIS W/O CONTRAST: CPT

## 2023-02-15 PROCEDURE — 93010 ELECTROCARDIOGRAM REPORT: CPT | Performed by: INTERNAL MEDICINE

## 2023-02-15 PROCEDURE — 99285 EMERGENCY DEPT VISIT HI MDM: CPT

## 2023-02-15 PROCEDURE — 83605 ASSAY OF LACTIC ACID: CPT

## 2023-02-15 PROCEDURE — 2580000003 HC RX 258: Performed by: STUDENT IN AN ORGANIZED HEALTH CARE EDUCATION/TRAINING PROGRAM

## 2023-02-15 PROCEDURE — 80053 COMPREHEN METABOLIC PANEL: CPT

## 2023-02-15 PROCEDURE — 71045 X-RAY EXAM CHEST 1 VIEW: CPT

## 2023-02-15 PROCEDURE — 87040 BLOOD CULTURE FOR BACTERIA: CPT

## 2023-02-15 PROCEDURE — 6360000002 HC RX W HCPCS: Performed by: STUDENT IN AN ORGANIZED HEALTH CARE EDUCATION/TRAINING PROGRAM

## 2023-02-15 PROCEDURE — 84145 PROCALCITONIN (PCT): CPT

## 2023-02-15 PROCEDURE — 84484 ASSAY OF TROPONIN QUANT: CPT

## 2023-02-15 PROCEDURE — 85025 COMPLETE CBC W/AUTO DIFF WBC: CPT

## 2023-02-15 PROCEDURE — 87636 SARSCOV2 & INF A&B AMP PRB: CPT

## 2023-02-15 PROCEDURE — 81001 URINALYSIS AUTO W/SCOPE: CPT

## 2023-02-15 RX ORDER — CEFDINIR 300 MG/1
300 CAPSULE ORAL DAILY
Qty: 7 CAPSULE | Refills: 0 | Status: SHIPPED | OUTPATIENT
Start: 2023-02-15 | End: 2023-02-18 | Stop reason: HOSPADM

## 2023-02-15 RX ORDER — DOXYCYCLINE 100 MG/1
100 CAPSULE ORAL 2 TIMES DAILY
Qty: 14 CAPSULE | Refills: 0 | Status: SHIPPED | OUTPATIENT
Start: 2023-02-15 | End: 2023-02-18 | Stop reason: HOSPADM

## 2023-02-15 RX ADMIN — VANCOMYCIN HYDROCHLORIDE 1750 MG: 10 INJECTION, POWDER, LYOPHILIZED, FOR SOLUTION INTRAVENOUS at 21:40

## 2023-02-15 RX ADMIN — PIPERACILLIN AND TAZOBACTAM 4500 MG: 4; .5 INJECTION, POWDER, FOR SOLUTION INTRAVENOUS at 20:04

## 2023-02-15 ASSESSMENT — PAIN - FUNCTIONAL ASSESSMENT: PAIN_FUNCTIONAL_ASSESSMENT: NONE - DENIES PAIN

## 2023-02-15 NOTE — PROCEDURES
Patient came to the x-ray department cxr, right knee, and right tib fib. She let us do the cxr but refused the leg x-rays.

## 2023-02-15 NOTE — ED NOTES
The following labs were labeled with appropriate pt sticker and tubed to lab:     [] Blue     [] Lavender   [] on ice  [x] Green/yellow  [] Green/black [] on ice  [] Lovenia   [] on ice  [] Yellow  [] Red  [] Type/ Screen  [] ABG  [] VBG    [] COVID-19 swab    [] Rapid  [] PCR  [] Flu swab  [] Peds Viral Panel     [] Urine Sample  [] Fecal Sample  [] Pelvic Cultures  [] Blood Cultures  [] X 2  [] STREP Cultures         Jay Emanuel RN  02/15/23 1113

## 2023-02-15 NOTE — ED NOTES
The following labs were labeled with appropriate pt sticker and tubed to lab:     [] Blue     [x] Lavender   [] on ice  [x] Green/yellow  [] Green/black [] on ice  [x] Grey  [x] on ice  [] Yellow  [] Red  [] Type/ Screen  [] ABG  [] VBG    [x] COVID-19 swab    [] Rapid  [x] PCR  [] Flu swab  [] Peds Viral Panel     [] Urine Sample  [] Fecal Sample  [] Pelvic Cultures  [] Blood Cultures  [] X 2  [] STREP Cultures         Ebenezer Gannon, TOBIAS  02/15/23 Boo Noriega, TOBIAS  02/15/23 9421

## 2023-02-15 NOTE — ED NOTES
The following labs were labeled with appropriate pt sticker and tubed to lab:     [] Blue     [] Lavender   [] on ice  [] Green/yellow  [] Green/black [] on ice  [] Adina Greener  [] on ice  [] Yellow  [] Red  [] Type/ Screen  [] ABG  [] VBG    [] COVID-19 swab    [] Rapid  [] PCR  [] Flu swab  [] Peds Viral Panel     [x] Urine Sample  [] Fecal Sample  [] Pelvic Cultures  [] Blood Cultures  [] X 2  [] STREP Cultures         Abida Willis RN  02/15/23 9235

## 2023-02-15 NOTE — ED PROVIDER NOTES
ATTENDING PROVIDER ATTESTATION:     Rosalina Campbell presented to the emergency department for evaluation of Fatigue (Per NH patient was lethargic this morning and not answering questions. EMS states she would choose which ones to answer. Patient denies complaints on arrival.  Patient had dialysis yesterday.)   and was initially evaluated by the Medical Resident. See Original ED Note for H&P and ED course above. I have reviewed and discussed the case, including pertinent history (medical, surgical, family and social) and exam findings with the Medical Resident assigned to Rosalina Campbell. I have personally performed and/or participated in the history, exam, medical decision making, and procedures and agree with all pertinent clinical information and any additional changes or corrections are noted below in my assessment and plan. I have discussed this patient in detail with the resident, and provided the instruction and education,       I have reviewed my findings and recommendations with the assigned Medical Resident, Rosalina Campbell and members of family present at the time of disposition. I have performed a history and physical examination of this patient and directly supervised the resident caring for this patient              [address]        Pt Name: Rosalina Campbell  MRN: [id number]  [place] 1950  Date of evaluation: 2/15/2023  Provider: Akila Langford MD  PCP: Myra Johnston MD  Note Started: 12:05 PM EST 2/15/23    CHIEF COMPLAINT       Chief Complaint   Patient presents with    Fatigue     Per NH patient was lethargic this morning and not answering questions. EMS states she would choose which ones to answer. Patient denies complaints on arrival.  Patient had dialysis yesterday.        HISTORY OF PRESENT ILLNESS: 1 or more Elements     Limitations to history : None    Rosalina Campbell is a 67 y.o. female who presents for fatigue and lethargy at the nursing facility. Patient presents by EMS as they said she would not answer the questions appropriately at the nursing home. They said she was lethargic. On EMS arrival she was wide-awake and told them her name. She is hearing denies complaints. She is alert and oriented x1. She is confused. She denies chest pain. Denies abdominal pain. She says she feels fine. She says she is not sure why they sent her here. Of note she was recently admitted to the hospital for shortness of breath. Nursing Notes were all reviewed and agreed with or any disagreements were addressed in the HPI. REVIEW OF EXTERNAL NOTE :       History and physical from February 10, 2023, nephrology notes from February 11, 2023    Controlled Substance Monitoring:    Acute and Chronic Pain Monitoring:   No flowsheet data found. REVIEW OF SYSTEMS :      Positives and Pertinent negatives as per HPI. SURGICAL HISTORY     Past Surgical History:   Procedure Laterality Date    ABDOMEN SURGERY N/A 5/4/2020    SACRAL WOUND DEBRIDEMENT CALL DRWatson  WITH TIME AVAIL AM performed by Julio Rievra MD at [address]   x3    CHOLECYSTECTOMY  3/31/16    Laparoscopic-Dr. Chandni Melendez  2011     for kidney stones    DIALYSIS FISTULA CREATION Left 8/5/2021    INSERTION FISTULA LEFT ARM performed by Mei Monroe MD at [address] Right 11/18/2021    REVISION AV FISTULA LEFT [facility] 11/4/2022    RIGHT DISTAL FEMUR OPEN REDUCTION INTERNAL FIXATION ++SYNTHES++ performed by Vince Morrow MD at [place] [address]  2009     right     UPPER GASTROINTESTINAL ENDOSCOPY  2.18.15    Dr. Shivam Bal Findings: Mild Gastrits and Duodenitis, 2cm Hiatal Hernia    UPPER GASTROINTESTINAL ENDOSCOPY N/A 5/8/2020    EGD CONTROL HEMORRHAGE performed by Julio Rivera MD at [facility] GASTROINTESTINAL ENDOSCOPY N/A 5/22/2020    EGD BEDSIDE performed by Jaime Mendoza MD at [address] 5/6/2021    INSERTION TUNNELED DIALYSIS CATHETER performed by Arely Novoa MD at [facility] Left 11/4/2022    LEFT DISTAL RADIUS OPEN REDUCTION INTERNAL FIXATION    ++SYNTHES++ performed by Kathya Malik MD at [facility]       Previous Medications    ACETAMINOPHEN (TYLENOL) 325 MG TABLET    Take 650 mg by mouth every 4 hours as needed for Pain or Fever    BENZONATATE (TESSALON) 100 MG CAPSULE    Take 100 mg by mouth 3 times daily as needed for Cough    BISACODYL (DULCOLAX) 10 MG SUPPOSITORY    Place 10 mg rectally daily as needed for Constipation    BUSPIRONE (BUSPAR) 10 MG TABLET    Take 10 mg by mouth daily    EPOETIN EBENEZER-EPBX (RETACRIT) 46659 UNIT/ML SOLN INJECTION    Inject 10,000 Units into the skin See Admin Instructions Given Tuesday,Thursday,Saturday    ESCITALOPRAM (LEXAPRO) 10 MG TABLET    Take 10 mg by mouth daily    FERRIC CITRATE (AURYXIA) 1  MG(FE) TABS TABLET    Take 420 mg by mouth 3 times daily (with meals)    GABAPENTIN (NEURONTIN) 300 MG CAPSULE    Take 300 mg by mouth daily.     GLUCOSE (GLUTOSE) 40 % GEL    Take 15 g by mouth as needed (hypoglycemia)    GLUCOSE 4 G CHEWABLE TABLET    Take 4 tablets by mouth as needed for Low blood sugar    GUAIFENESIN 200 MG/5ML LIQD    Take 200 mg by mouth every 6 hours as needed (cough)    INSULIN GLARGINE (LANTUS) 100 UNIT/ML INJECTION VIAL    Inject 6 Units into the skin nightly    INSULIN LISPRO, 1 UNIT DIAL, (HUMALOG/ADMELOG) 100 UNIT/ML SOPN    Inject 0-10 Units into the skin 3 times daily (before meals) *Per Sliding Scale*    LACTULOSE (CHRONULAC) 10 GM/15ML SOLUTION    Take 10 g by mouth daily as needed (constipation)    METOPROLOL SUCCINATE (TOPROL XL) 25 MG EXTENDED RELEASE TABLET    Take 1 tablet by mouth daily    MIDODRINE (PROAMATINE) 5 MG TABLET    Take 15 mg by mouth See Admin Instructions Given Monday,Wednesday,Friday    ONDANSETRON (ZOFRAN) 4 MG TABLET    Take 4 mg by mouth every 12 hours as needed for Nausea or Vomiting    OXYCODONE-ACETAMINOPHEN (PERCOCET) 5-325 MG PER TABLET    Take 1 tablet by mouth every 6 hours as needed for Pain. POLYETHYLENE GLYCOL (GLYCOLAX) 17 G PACKET    Take 17 g by mouth daily as needed for Constipation    SENNA (SENOKOT) 8.6 MG TABLET    Take 2 tablets by mouth 2 times daily    SODIUM PHOSPHATE (FLEET) 7-19 GM/118ML    Place 1 enema rectally once as needed (constipation)    TRAZODONE (DESYREL) 50 MG TABLET    Take 25 mg by mouth nightly    VITAMIN D (CHOLECALCIFEROL) 25 MCG (1000 UT) TABS TABLET    Take 1,000 Units by mouth daily       ALLERGIES     Patient has no known allergies.     FAMILYHISTORY       Family History   Problem Relation Age of Onset    Cancer Sister         SOCIAL HISTORY       Social History     Tobacco Use    Smoking status: Former     Packs/day: 1.00     Years: 30.00     Pack years: 30.00     Types: Cigarettes     Quit date: 2011     Years since quittin.5    Smokeless tobacco: Never   Vaping Use    Vaping Use: Never used   Substance Use Topics    Alcohol use: No    Drug use: No       SCREENINGS        [place] Coma Scale  Eye Opening: Spontaneous  Best Verbal Response: Confused  Best Motor Response: Obeys commands  [place] Coma Scale Score: 14                CIWA Assessment  BP: (!) 183/79  Heart Rate: 73           PHYSICAL EXAM  1 or more Elements     ED Triage Vitals [02/15/23 1137]   BP Temp Temp Source Heart Rate Resp SpO2 Height Weight   (!) 203/75 97.6 °F (36.4 °C) Oral 82 16 92 % -- --                 Constitutional/General: Alert and oriented   Head: Normocephalic and atraumatic  Eyes: PERRL, EOMI, conjunctiva normal, sclera non icteric  ENT:  Oropharynx clear, handling secretions, no trismus, no asymmetry of the posterior oropharynx or uvular edema  Neck: Supple, full ROM, no stridor, no meningeal signs  Respiratory: Lungs with decreased breath sounds bilaterally. Not in respiratory distress  Cardiovascular:  Regular rate. Regular rhythm. No murmurs, no gallops, no rubs. 2+ distal pulses. Equal extremity pulses. Chest: No chest wall tenderness  GI:  Abdomen Soft, Non tender, Non distended. No rebound, guarding, or rigidity. No pulsatile masses. Musculoskeletal: Moves all extremities x 4. Warm and well perfused, no clubbing, no cyanosis, no edema. Capillary refill <3 seconds  Integument: skin warm and dry. No rashes. Healing wound on the right lateral knee. No drainage. No obvious signs of infection.   Neurologic: GCS 15, no focal deficits,             DIAGNOSTIC RESULTS   LABS: Interpreted by Lazarus Pancake, MD    Labs Reviewed   CBC WITH AUTO DIFFERENTIAL - Abnormal; Notable for the following components:       Result Value    RBC 3.22 (*)     Hemoglobin 11.0 (*)     .1 (*)     MCHC 31.6 (*)     RDW 15.5 (*)     Lymphocytes % 15.8 (*)     Monocytes % 13.2 (*)     Lymphocytes Absolute 1.32 (*)     Monocytes Absolute 1.10 (*)     All other components within normal limits   COMPREHENSIVE METABOLIC PANEL - Abnormal; Notable for the following components:    CO2 30 (*)     Glucose 107 (*)     Creatinine 4.7 (*)     Albumin 3.2 (*)     All other components within normal limits   LIPASE - Abnormal; Notable for the following components:    Lipase 8 (*)     All other components within normal limits   TROPONIN - Abnormal; Notable for the following components:    Troponin, High Sensitivity 110 (*)     All other components within normal limits   URINALYSIS WITH MICROSCOPIC - Abnormal; Notable for the following components:    Glucose, Ur 100 (*)     Protein,  (*)     All other components within normal limits   TROPONIN - Abnormal; Notable for the following components:    Troponin, High Sensitivity 103 (*)     All other components within normal limits   COVID-19 & INFLUENZA COMBO   CULTURE, URINE LACTIC ACID       As interpreted by me, the above displayed labs are abnormal. All other labs obtained during this visit were within normal range or not returned as of this dictation. RADIOLOGY:   Unless a wet read is documented in MDM or ED course,  non-plain film images such as CT, Ultrasound and MRI are read by the radiologist. Plain radiographic images are visualized and preliminarily interpreted by the ED Provider with the findings documented in the ED course:    Interpretation per the Radiologist below, if available at the time of this note:    CT HEAD WO CONTRAST   Final Result   No acute intracranial abnormality. CT ABDOMEN PELVIS WO CONTRAST Additional Contrast? None   Final Result   Inferior right upper lobe, lingular and bilateral lower lobe infiltrates   consistent with atelectasis and pneumonia. There are bilateral pleural   effusions. Indeterminate 16 mm left renal lesion. A solid lesion including secondary to   neoplastic disease cannot be excluded. Further evaluation with ultrasound or   MRI is suggested. Diffuse urinary bladder wall thickening. Cystitis is suspected. Cardiomegaly and atherosclerosis. Diverticulosis but no evidence of diverticulitis. Stool distended rectum suggesting fecal impaction. Decubitus ulcer with erosive change inferior sacrum/coccyx suspicious for   osteomyelitis. The appearance however is similar. XR CHEST PORTABLE   Final Result   Somewhat limited exam.  Enlarged cardiac silhouette and findings related to a   history of granulomatous disease. Pulmonary infiltrates on the right,   increased in the upper lung since 02/09/2023. Possible left basilar pleural   and/or parenchymal disease.            CT CHEST WO CONTRAST    Result Date: 2/9/2023  EXAMINATION: CT OF THE CHEST WITHOUT CONTRAST 2/9/2023 7:37 pm TECHNIQUE: CT of the chest was performed without the administration of intravenous contrast. Multiplanar reformatted images are provided for review. Automated exposure control, iterative reconstruction, and/or weight based adjustment of the mA/kV was utilized to reduce the radiation dose to as low as reasonably achievable. COMPARISON: Chest one view, 02/09/2023; chest one view, 04/12/2022; CT abdomen and pelvis, 04/12/2022 HISTORY: ORDERING SYSTEM PROVIDED HISTORY: ?Pneumonia vs pleural effusion TECHNOLOGIST PROVIDED HISTORY: Reason for exam:->? Pneumonia vs pleural effusion What reading provider will be dictating this exam?->CRC FINDINGS: Lungs: There are small bilateral pleural effusions with posterior bibasilar compressive atelectasis/collapse, slightly more pronounced on left. No pneumothorax. Mediastinum: No mediastinal hematoma. No significant supraclavicular, axillary, or mediastinal adenopathy. There are multiple calcified mediastinal lymph nodes in the superior mediastinum, right paratracheal, prevascular, aortopulmonary window, subcarinal, and bilateral perihilar spaces. No thyromegaly. Vascular: The heart is mildly enlarged. There is dense mitral valve annulus calcification. No pericardial effusion. There is atherosclerotic, aneurysmal thoracic aorta. The ascending aorta measures approximately 3.7 cm and the descending aorta measures approximately 2.6 cm. The central pulmonary arteries are within normal limits. Bones/Soft-tissues: The bones are osteopenic. There is mild thoracolumbar spine degenerative change and scoliosis. Upper abdomen: Cholecystectomy. There are multiple scattered calcified granulomas in the spleen. There is a single calcified granuloma in the visualized posterior right hepatic lobe. There is dense atherosclerosis of the visualized abdominal aorta, celiac trunk, superior mesenteric artery, splenic artery, and bilateral renal arteries. Small bilateral pleural effusions with posterior bibasilar compressive atelectasis/collapse.  Old granulomatous disease of the mediastinum, liver, spleen. Cardiomegaly. Atherosclerotic, aneurysmal thoracic aorta, as detailed above. XR CHEST PORTABLE    Result Date: 2/9/2023  EXAMINATION: ONE XRAY VIEW OF THE CHEST 2/9/2023 12:43 am COMPARISON: 04/12/2022 HISTORY: ORDERING SYSTEM PROVIDED HISTORY: shortness of breath TECHNOLOGIST PROVIDED HISTORY: Reason for exam:->shortness of breath FINDINGS: The cardiomediastinal silhouette is stable. There are hazy bilateral airspace opacities, greater in the right lower lobe. No pneumothorax or large pleural effusion. Hazy airspace opacities, concerning for pneumonia. No results found. PROCEDURES   Unless otherwise noted below, none       PAST MEDICAL HISTORY/Chronic Conditions Affecting Care      has a past medical history of Anemia in chronic kidney disease, Anxiety and depression, Arthritis, Chronic pain syndrome, COVID-19 (05/2020), COVID-19, Decreased dorsalis pedis pulse (10/25/2022), Diabetes mellitus ([address] Dysphagia, oral phase, ESRD on hemodialysis ([address] (10/25/2022), GERD (gastroesophageal reflux disease), Hemodialysis patient ([address] Hyperkalemia, Hypertension, Hypertensive kidney disease with stage 4 chronic kidney disease ([address] Insomnia, Kidney stones, MDD (major depressive disorder), Moderate protein-calorie malnutrition ([address] Morbid obesity ([address] Muscle weakness (generalized), Obesity, Pressure injury of sacral region, stage 3 ([address] (10/25/2022), Pressure ulcer of sacral region, Sacral pressure ulcer (08/03/2021), Unspecified osteoarthritis, unspecified site, and Weakness generalized.      EMERGENCY DEPARTMENT COURSE    Vitals:    Vitals:    02/15/23 1137 02/15/23 1642 02/15/23 1735 02/15/23 1850   BP: (!) 203/75 (!) 140/83 134/66 (!) 183/79   Pulse: 82 89 71 73   Resp: 16  18 20   Temp: 97.6 °F (36.4 °C)      TempSrc: Oral      SpO2: 92% 94% 98% 94%       Patient was given the following medications:  Medications - No data to display        Is this patient to be included in the SEP-1 Core Measure due to severe sepsis or septic shock? No   Exclusion criteria - the patient is NOT to be included for SEP-1 Core Measure due to:  2+ SIRS criteria are not met        Medical Decision Making/Differential Diagnosis:    CC/HPI Summary, Social Determinants of health, Records Reviewed, DDx, testing done/not done, ED Course, Reassessment, disposition considerations/shared decision making with patient, consults, disposition:      ED Course as of 02/15/23 1858   Wed Feb 15, 2023   1208 EKG Interpretation  Interpreted by emergency department physician, Dr. Violet Evans     2/15/23  Time: 2482    Rhythm: normal sinus  and 1 degree AV block  Rate: normal  Axis: normal  Conduction: 1st degree AV block  ST Segments: no acute change  T Waves: no acute change    Clinical Impression: Sinus rhythm, no acute ischemic changes, stable from prior  Comparison to Old EKG  Stable from prior EKG       [CD]      ED Course User Index  [CD] Mahamed Sharma MD          Medical Decision Making   Differential Diagnosis includes but not limited to: Hypoglycemia, electrolyte abnormality, infection, delirium. Fortunately on arrival she is awake and alert and appears to be at baseline. She denies complaints. She can tell me her name. She is hemodynamically stable. Vitals reassuring. She has a reassuring exam.  Her lateral knee wound is dressed does not show obvious signs of infection. My independent interpretation of the laboratory test demonstrate troponin 110, decreased to 103, CBC with hemoglobin 11.0, CMP with creatinine 4.7, lactic acid normal, lipase normal, urinalysis without signs of infection, head CT by my wet read interpretation shows no intracranial hemorrhage, chest x-ray without obvious pneumothorax. She did have a CT scan of her abdomen and pelvis that was concerning for possible atelectasis versus pneumonia. While she has no fever or white count, she was just seen here for hypoxia and concerns for pneumonia.   I do not appreciate any antimicrobials on her current medication list.  At the time of this dictation her discharge summary is still pending from her recent inpatient visit. She is not hypoxic. Hemodynamically stable. Awake and alert. I do not appreciate any indication for admission. We will add antimicrobials to take back to the nursing facility. She is not in distress. Does not appear to be volume overloaded. Did consider hospitalization but based on her most recent admission, reassuring vitals, reassuring lab testing I do not see a reason to readmit her to the hospital today. Problems Addressed:  Altered mental status, unspecified altered mental status type: acute illness or injury  Other fatigue: acute illness or injury    Amount and/or Complexity of Data Reviewed  Independent Historian: EMS  External Data Reviewed: notes. Labs: ordered. Decision-making details documented in ED Course. Radiology: ordered and independent interpretation performed. Decision-making details documented in ED Course. ECG/medicine tests: ordered and independent interpretation performed. Decision-making details documented in ED Course. Risk  Prescription drug management. Decision regarding hospitalization. CONSULTS:   None              CRITICAL CARE TIME (.cct)            I am the Primary Clinician of Record. FINAL IMPRESSION      1. Altered mental status, unspecified altered mental status type    2.  Other fatigue          DISPOSITION/PLAN     DISPOSITION Decision To Discharge 02/15/2023 05:47:54 PM      PATIENT REFERRED TO:  Sara Alston MD  [facility] 0053          DISCHARGE MEDICATIONS:  New Prescriptions    CEFDINIR (OMNICEF) 300 MG CAPSULE    Take 1 capsule by mouth daily for 7 days    DOXYCYCLINE MONOHYDRATE (MONODOX) 100 MG CAPSULE    Take 1 capsule by mouth 2 times daily for 7 days       DISCONTINUED MEDICATIONS:  Discontinued Medications    No medications on file              (Please note that portions of this note were completed with a voice recognition program.  Efforts were made to edit the dictations but occasionally words are mis-transcribed.)    Roseline Diaz MD (electronically signed)           Filemon Freeman MD  02/15/23 7902

## 2023-02-15 NOTE — TELEPHONE ENCOUNTER
Writer contacted ED provider to inform of 30 day readmission risk.    Writer's attempt to contact ED provider was unsuccessful.    Call Back: If you need to call back to inform of disposition you can contact me at [phone]

## 2023-02-15 NOTE — LETTER
[place] Department Medicaid  CERTIFICATION OF NECESSITY  FOR NON-EMERGENCY TRANSPORTATION   BY GROUND AMBULANCE      Individual Information   1. Name: Meño Carrington 2. [place] Medicaid Billing Number:    3. Address: [address]      Transportation Provider Information   4. Provider Name: Digna Perry     5. [place] Medicaid Provider Number:  National Provider Identifier (NPI):      Certification  7. Criteria:  During transport, this individual requires:  [x] Medical treatment or continuous     supervision by an EMT. [x] The administration or regulation of oxygen by another person. [] Supervised protective restraint. 8. Period Beginning Date: 2/18/2023     9. Length  [x] Not more than 1 day(s)  [] One Year     Additional Information Relevant to Certification   10. Comments or Explanations, If Necessary or Appropriate     2LO2 per [place], ESRD, Pneumonia, Decubitus Ulcer, Alert to person only, pleural effusions. Wounds coccyx, fall risk     Certifying Practitioner Information   11. Name of Practitioner:  Dr Josué Lakhani MD   12. [place] Medicaid Provider Number, If Applicable:  [address] Provider Identifier (NPI):      Signature Information   14. Date of Signature:  2/18/2023   15. Name of Person Signing:Electronically signed by Hafsa Russ RN on 2/18/2023 at 8:31 AM     16. Signature and Professional Designation:       [facility] [id number]  Rev. 7/2015  [address] [place] [address] Encounter Date/Time: 2/15/2023 [address] Account: [de-identified]    MRN: [id number]    Patient: Meño Carrington    Contact Serial #: [id number]      ENCOUNTER          Patient Class: I Private Enc?   No Unit RM BD: Nathaniel Yousif [address] Service: MED   Encounter DX: Pneumonia due to organis*   ADM Provider: Josué Lakhani MD   Procedure:     ATT Provider: Josué Lakhani MD   REF Provider:        Admission DX: Pneumonia due to organism, Hypoxia, Altered mental status, unspecified altered mental status type, Pneumonia of right upper lobe due to infectious organism, Other fatigue, Pneumonia due to infectious organism, unspecified laterality, unspecified part of lung and DX codes: J18.9, R09.02, R41.82, J18.9, R53.83, J18.9      PATIENT                 Name: Jeana Powell : 1950 (72 yrs)   Address: [place] [address]  Sex: Female   [place] city: [place] [address]         Marital Status:    Employer: RETIRED         Gnosticist: Yazdanism   Primary Care Provider: Timmy Robledo MD         Primary Phone: [phone]   EMERGENCY CONTACT   Contact Name Legal Guardian? Relationship to Patient Home Phone Work Phone   1. Johnson Mcguire  2. Inga Mcguire  No Child  Child [phone]              GUARANTOR            Guarantor: Jeana Powell     : 1950   Address: [address] [place] [address] Sex: Female   [place] [address]     Relation to Patient: Self       Home Phone: [phone]   Guarantor ID: [id number]       Work Phone:     Guarantor Employer: RETIRED         Status: RETIRED      COVERAGE        PRIMARY INSURANCE   Payor: [facility] MEDICARE Plan: [employer]*   Payor Address: ,          Group Number: [id number] Insurance Type: INDEMNITY   Subscriber Name: Lalo Leigh : 1950   Subscriber ID: [id number] Pat. Rel. to Sub: Self   SECONDARY INSURANCE   Payor: [address] [place] [address]*   Payor Address:  [address] [place] [address] [place] [address], [address] [place] [address]          Group Number: [id number] [id number] Insurance Type: INDEMNITY   Subscriber Name: Lalo Leigh : 1950   Subscriber ID: [id number] Pat.  Rel. to Sub: SELF      CSN: [id number]

## 2023-02-15 NOTE — ED PROVIDER NOTES
[address] [facility]        Pt Name: Ambika Parker  MRN: [id number]  [place] 1950  Date of evaluation: 2/15/2023  Provider: Elyse Garcia DO  PCP: Zoya Everett MD  Note Started: 1:03 PM EST 2/15/23    CHIEF COMPLAINT       Chief Complaint   Patient presents with    Fatigue     Per NH patient was lethargic this morning and not answering questions. EMS states she would choose which ones to answer. Patient denies complaints on arrival.  Patient had dialysis yesterday. HISTORY OF PRESENT ILLNESS: 1 or more Elements   History From: Patient and EMS    Limitations to history : Patient is a poor historian. She is slightly confused. She is awake, alert and oriented x1 to time. Ambika Parker is a 67 y.o. female with past medical history chronic sacral pressure wounds and leg wounds, diabetes, hypertension and end-stage renal disease on hemodialysis of who presents to the emergency department for altered mental status. Patient was brought in by EMS from nursing facility for evaluation. Nursing staff states that they found the patient altered and minimally responsive earlier today. By the time EMS arrived, they stated that the patient was awake and alert and fully responsive. Patient is slightly disoriented but has a history of dementia apparently. She is unable to provide a detailed history. She is a very poor historian and has poor insight into her chronic medical conditions. She was awake, alert and oriented x1 to time initially. Patient confused about where she was and who she was. She kept seeing her daughter's name. Patient vaguely denying any specific symptoms including chest pain, abdominal pain, nausea, vomiting, shortness of breath, constipation or diarrhea. Patient is chronically ill-appearing but nontoxic-appearing initial assessment. No recent sick contacts noted.     Nursing Notes were all reviewed and agreed with or any disagreements were addressed in the HPI.    ROS:   Pertinent positives and negatives are stated within HPI, all other systems reviewed and are negative.    --------------------------------------------- PAST HISTORY ---------------------------------------------  Past Medical History:  has a past medical history of Anemia in chronic kidney disease, Anxiety and depression, Arthritis, Chronic pain syndrome, COVID-19, COVID-19, Decreased dorsalis pedis pulse, Diabetes mellitus ([address] Dysphagia, oral phase, ESRD on hemodialysis ([address] GERD (gastroesophageal reflux disease), Hemodialysis patient ([address] Hyperkalemia, Hypertension, Hypertensive kidney disease with stage 4 chronic kidney disease ([address] Insomnia, Kidney stones, MDD (major depressive disorder), Moderate protein-calorie malnutrition ([address] Morbid obesity ([address] Muscle weakness (generalized), Obesity, Pressure injury of sacral region, stage 3 ([address] Pressure ulcer of sacral region, Sacral pressure ulcer, Unspecified osteoarthritis, unspecified site, and Weakness generalized. Past Surgical History:  has a past surgical history that includes Foot surgery ( ); Cystocopy ( );  section (x3); Upper gastrointestinal endoscopy (2.18.15); Cholecystectomy (3/31/16); Abdomen surgery (N/A, 2020); Upper gastrointestinal endoscopy (N/A, 2020); Upper gastrointestinal endoscopy (N/A, 2020); vascular surgery (N/A, 2021); Dialysis fistula creation (Left, 2021); Dialysis fistula creation (Right, 2021); Femur fracture surgery (Right, 2022); and Wrist fracture surgery (Left, 2022). Social History:  reports that she quit smoking about 11 years ago. Her smoking use included cigarettes. She has a 30.00 pack-year smoking history. She has never used smokeless tobacco. She reports that she does not drink alcohol and does not use drugs. Family History: family history includes Cancer in her sister.      The patients home medications have been reviewed. Allergies: Patient has no known allergies. ---------------------------------------------------PHYSICAL EXAM-------------------------------------      Constitutional/General: Alert and oriented x 1 to time, answer simple questions and follows commands appropriately. Non toxic in NAD, elderly and chronically ill-appearing. Head: Normocephalic and atraumatic  Mouth: Oropharynx clear, handling secretions, no trismus  Neck: Supple, full ROM,  Pulmonary: Globally diminished breath sounds, worsened by body habitus and poor inspiratory effort. Lungs otherwise clear to auscultation bilaterally, no wheezes, rales, or rhonchi. Not in respiratory distress  Cardiovascular:  Regular rate. Regular rhythm. No murmurs  Chest: no chest wall tenderness  Abdomen: Soft. Non tender. Non distended. No rebound, guarding, or rigidity. No pulsatile masses appreciated. Musculoskeletal: Moves all extremities x 4. Warm and well perfused, no clubbing, cyanosis, or edema. Capillary refill <3 seconds  Skin: warm and dry. No rashes. Chronic wound to the right lateral and anterior knee. No overlying skin changes suggesting cellulitis including erythema, warmth or lymphatic streaking. Neurologic: GCS 15, no gross focal neurologic deficits  Psych: Normal Affect          -------------------------------------------------- RESULTS -------------------------------------------------  I have personally reviewed all laboratory and imaging results for this patient. Results are listed below.      LABS:  Results for orders placed or performed during the hospital encounter of 02/15/23   COVID-19 & Influenza Combo    Specimen: Nasopharyngeal Swab   Result Value Ref Range    SARS-CoV-2 RNA, RT PCR NOT DETECTED NOT DETECTED    INFLUENZA A NOT DETECTED NOT DETECTED    INFLUENZA B NOT DETECTED NOT DETECTED   CBC with Auto Differential   Result Value Ref Range    WBC 8.3 4.5 - 11.5 E9/L    RBC 3.22 (L) 3.50 - 5.50 E12/L    Hemoglobin 11.0 (L) 11.5 - 15.5 g/dL    Hematocrit 34.8 34.0 - 48.0 %    .1 (H) 80.0 - 99.9 fL    MCH 34.2 26.0 - 35.0 pg    MCHC 31.6 (L) 32.0 - 34.5 %    RDW 15.5 (H) 11.5 - 15.0 fL    Platelets 385 918 - 141 E9/L    MPV 11.0 7.0 - 12.0 fL    Neutrophils % 67.4 43.0 - 80.0 %    Immature Granulocytes % 0.4 0.0 - 5.0 %    Lymphocytes % 15.8 (L) 20.0 - 42.0 %    Monocytes % 13.2 (H) 2.0 - 12.0 %    Eosinophils % 2.0 0.0 - 6.0 %    Basophils % 1.2 0.0 - 2.0 %    Neutrophils Absolute 5.62 1.80 - 7.30 E9/L    Immature Granulocytes # 0.03 E9/L    Lymphocytes Absolute 1.32 (L) 1.50 - 4.00 E9/L    Monocytes Absolute 1.10 (H) 0.10 - 0.95 E9/L    Eosinophils Absolute 0.17 0.05 - 0.50 E9/L    Basophils Absolute 0.10 0.00 - 0.20 E9/L   CMP   Result Value Ref Range    Sodium 142 132 - 146 mmol/L    Potassium 4.8 3.5 - 5.0 mmol/L    Chloride 102 98 - 107 mmol/L    CO2 30 (H) 22 - 29 mmol/L    Anion Gap 10 7 - 16 mmol/L    Glucose 107 (H) 74 - 99 mg/dL    BUN 23 6 - 23 mg/dL    Creatinine 4.7 (H) 0.5 - 1.0 mg/dL    Est, Glom Filt Rate 9 >=60 mL/min/1.73    Calcium 9.7 8.6 - 10.2 mg/dL    Total Protein 6.9 6.4 - 8.3 g/dL    Albumin 3.2 (L) 3.5 - 5.2 g/dL    Total Bilirubin 0.4 0.0 - 1.2 mg/dL    Alkaline Phosphatase 76 35 - 104 U/L    ALT 7 0 - 32 U/L    AST 15 0 - 31 U/L   Lactic Acid   Result Value Ref Range    Lactic Acid 1.2 0.5 - 2.2 mmol/L   Lipase   Result Value Ref Range    Lipase 8 (L) 13 - 60 U/L   Troponin   Result Value Ref Range    Troponin, High Sensitivity 110 (H) 0 - 9 ng/L   Urinalysis with Microscopic   Result Value Ref Range    Color, UA Yellow Straw/Yellow    Clarity, UA Clear Clear    Glucose, Ur 100 (A) Negative mg/dL    Bilirubin Urine Negative Negative    Ketones, Urine Negative Negative mg/dL    Specific Gravity, UA 1.015 1.005 - 1.030    Blood, Urine Negative Negative    pH, UA 8.5 5.0 - 9.0    Protein,  (A) Negative mg/dL    Urobilinogen, Urine 0.2 <2.0 E.U./dL    Nitrite, Urine Negative Negative    Leukocyte Esterase, Urine Negative Negative    WBC, UA 0-1 0 - 5 /HPF    RBC, UA 0-1 0 - 2 /HPF    Epithelial Cells, UA RARE /HPF    Bacteria, UA NONE SEEN None Seen /HPF   Troponin   Result Value Ref Range    Troponin, High Sensitivity 103 (H) 0 - 9 ng/L   Procalcitonin   Result Value Ref Range    Procalcitonin 1.66 (H) 0.00 - 0.08 ng/mL   POCT Glucose   Result Value Ref Range    Meter Glucose 94 74 - 99 mg/dL   POCT Glucose   Result Value Ref Range    Meter Glucose 114 (H) 74 - 99 mg/dL   EKG 12 Lead   Result Value Ref Range    Ventricular Rate 82 BPM    Atrial Rate 82 BPM    P-R Interval 274 ms    QRS Duration 140 ms    Q-T Interval 462 ms    QTc Calculation (Bazett) 539 ms    P Axis 77 degrees    R Axis 45 degrees    T Axis 93 degrees       RADIOLOGY:   Interpretation per the Radiologist below, if available at the time of this note:    CT HEAD WO CONTRAST   Final Result   No acute intracranial abnormality. CT ABDOMEN PELVIS WO CONTRAST Additional Contrast? None   Final Result   Inferior right upper lobe, lingular and bilateral lower lobe infiltrates   consistent with atelectasis and pneumonia. There are bilateral pleural   effusions. Indeterminate 16 mm left renal lesion. A solid lesion including secondary to   neoplastic disease cannot be excluded. Further evaluation with ultrasound or   MRI is suggested. Diffuse urinary bladder wall thickening. Cystitis is suspected. Cardiomegaly and atherosclerosis. Diverticulosis but no evidence of diverticulitis. Stool distended rectum suggesting fecal impaction. Decubitus ulcer with erosive change inferior sacrum/coccyx suspicious for   osteomyelitis. The appearance however is similar. XR CHEST PORTABLE   Final Result   Somewhat limited exam.  Enlarged cardiac silhouette and findings related to a   history of granulomatous disease. Pulmonary infiltrates on the right,   increased in the upper lung since 02/09/2023.   Possible left basilar pleural   and/or parenchymal disease. CT CHEST WO CONTRAST    (Results Pending)     CT CHEST WO CONTRAST    Result Date: 2/9/2023  EXAMINATION: CT OF THE CHEST WITHOUT CONTRAST 2/9/2023 7:37 pm TECHNIQUE: CT of the chest was performed without the administration of intravenous contrast. Multiplanar reformatted images are provided for review. Automated exposure control, iterative reconstruction, and/or weight based adjustment of the mA/kV was utilized to reduce the radiation dose to as low as reasonably achievable. COMPARISON: Chest one view, 02/09/2023; chest one view, 04/12/2022; CT abdomen and pelvis, 04/12/2022 HISTORY: ORDERING SYSTEM PROVIDED HISTORY: ?Pneumonia vs pleural effusion TECHNOLOGIST PROVIDED HISTORY: Reason for exam:->? Pneumonia vs pleural effusion What reading provider will be dictating this exam?->CRC FINDINGS: Lungs: There are small bilateral pleural effusions with posterior bibasilar compressive atelectasis/collapse, slightly more pronounced on left. No pneumothorax. Mediastinum: No mediastinal hematoma. No significant supraclavicular, axillary, or mediastinal adenopathy. There are multiple calcified mediastinal lymph nodes in the superior mediastinum, right paratracheal, prevascular, aortopulmonary window, subcarinal, and bilateral perihilar spaces. No thyromegaly. Vascular: The heart is mildly enlarged. There is dense mitral valve annulus calcification. No pericardial effusion. There is atherosclerotic, aneurysmal thoracic aorta. The ascending aorta measures approximately 3.7 cm and the descending aorta measures approximately 2.6 cm. The central pulmonary arteries are within normal limits. Bones/Soft-tissues: The bones are osteopenic. There is mild thoracolumbar spine degenerative change and scoliosis. Upper abdomen: Cholecystectomy. There are multiple scattered calcified granulomas in the spleen.   There is a single calcified granuloma in the visualized posterior right hepatic lobe. There is dense atherosclerosis of the visualized abdominal aorta, celiac trunk, superior mesenteric artery, splenic artery, and bilateral renal arteries. Small bilateral pleural effusions with posterior bibasilar compressive atelectasis/collapse. Old granulomatous disease of the mediastinum, liver, spleen. Cardiomegaly. Atherosclerotic, aneurysmal thoracic aorta, as detailed above. XR CHEST PORTABLE    Result Date: 2/9/2023  EXAMINATION: ONE XRAY VIEW OF THE CHEST 2/9/2023 12:43 am COMPARISON: 04/12/2022 HISTORY: ORDERING SYSTEM PROVIDED HISTORY: shortness of breath TECHNOLOGIST PROVIDED HISTORY: Reason for exam:->shortness of breath FINDINGS: The cardiomediastinal silhouette is stable. There are hazy bilateral airspace opacities, greater in the right lower lobe. No pneumothorax or large pleural effusion. Hazy airspace opacities, concerning for pneumonia. No results found. See preliminary interpretation by me below. EKG: This EKG is signed and interpreted by me. Normal sinus rhythm with first-degree AV block. PA interval normal.  QTc not prolonged. No ST elevation MI. No significant changes compared to previous EKG.      ------------------------- NURSING NOTES AND VITALS REVIEWED ---------------------------   The nursing notes within the ED encounter and vital signs as below have been reviewed by myself. BP (!) 157/72   Pulse 68   Temp 98.2 °F (36.8 °C) (Oral)   Resp 18   Ht 5' 1\" (1.549 m)   Wt 195 lb 14.4 oz (88.9 kg)   SpO2 98%   BMI 37.01 kg/m²   Oxygen Saturation Interpretation: Improved after treatment    The patients available past medical records and past encounters were reviewed.         ------------------------------ ED COURSE/MEDICAL DECISION MAKING----------------------  Medications   bisacodyl (DULCOLAX) suppository 10 mg (has no administration in time range)   busPIRone (BUSPAR) tablet 10 mg (has no administration in time range) escitalopram (LEXAPRO) tablet 10 mg (has no administration in time range)   gabapentin (NEURONTIN) capsule 300 mg (has no administration in time range)   guaiFENesin (ROBITUSSIN) 100 MG/5ML liquid 200 mg (has no administration in time range)   insulin glargine-yfgn (SEMGLEE-YFGN) injection vial 6 Units (6 Units SubCUTAneous Not Given 2/16/23 0109)   lactulose (CHRONULAC) 10 GM/15ML solution 10 g (10 g Oral Given 2/16/23 0117)   metoprolol succinate (TOPROL XL) extended release tablet 25 mg (has no administration in time range)   oxyCODONE-acetaminophen (PERCOCET) 5-325 MG per tablet 1 tablet (has no administration in time range)   senna (SENOKOT) tablet 17.2 mg (17.2 mg Oral Given 2/16/23 0117)   traZODone (DESYREL) tablet 25 mg (25 mg Oral Not Given 2/16/23 0110)   sodium chloride flush 0.9 % injection 5-40 mL (has no administration in time range)   sodium chloride flush 0.9 % injection 5-40 mL (has no administration in time range)   0.9 % sodium chloride infusion (has no administration in time range)   acetaminophen (TYLENOL) tablet 650 mg (has no administration in time range)     Or   acetaminophen (TYLENOL) suppository 650 mg (has no administration in time range)   insulin lispro (HUMALOG) injection vial 0-8 Units (has no administration in time range)   insulin lispro (HUMALOG) injection vial 0-4 Units (0 Units SubCUTAneous Not Given 2/16/23 0109)   glucose chewable tablet 16 g (has no administration in time range)   dextrose bolus 10% 125 mL (has no administration in time range)     Or   dextrose bolus 10% 250 mL (has no administration in time range)   glucagon injection 1 mg (1 mg SubCUTAneous Given 2/16/23 0117)   dextrose 10 % infusion (has no administration in time range)   polyethylene glycol (GLYCOLAX) packet 17 g (has no administration in time range)   vancomycin (VANCOCIN) 1,750 mg in sodium chloride 0.9 % 500 mL IVPB (0 mg IntraVENous Stopped 2/15/23 2340)   piperacillin-tazobactam (ZOSYN) 4,500 mg in sodium chloride 0.9 % 100 mL IVPB ([id number]) (0 mg IntraVENous Stopped 2/15/23 2044)       Medical Decision Making/Differential Diagnosis:    CC/HPI Summary, Pertinent Physical Exam Findings, Social Determinants of health, Records Reviewed, DDx, testing done/not done, ED Course, Reassessment, disposition considerations/shared decision making with patient, consults, disposition:        Medical Decision Making:   I, Dr. Eileen Brito am the resident physician of record. History From: Patient    Limitations to history : Patient is a poor historian. She is slightly confused. She is awake, alert and oriented x1 to time. Aime Oquendo is a 67 y.o. female who presents to the ED for altered mental status. Vital signs upon arrival BP (!) 157/72   Pulse 68   Temp 98.2 °F (36.8 °C) (Oral)   Resp 18   Ht 5' 1\" (1.549 m)   Wt 195 lb 14.4 oz (88.9 kg)   SpO2 98%   BMI 37.01 kg/m²     On initial evaluation, patient is chronically ill-appearing but nontoxic-appearing. She is afebrile, hemodynamically stable in no acute distress. Differential diagnosis includes but is not limited to encephalitis, meningitis, hypo/hyperglycemia, UTI, pneumonia, metabolic derangement, TIA, CVA, hypercapnic or hypoxic respiratory failure, medication toxicity/polypharmacy, PERLA or malignancy. Physical exam is essentially benign. Patient was awake, alert and oriented x1 to time. Slightly to confused and disoriented however baseline is generally unknown. No focal neurological deficits noted. No nuchal rigidity or meningeal signs. Abdomen is soft, nontender nondistended. Lungs with globally diminished breath sounds, worsened by body habitus and poor inspiratory effort however no obvious wheezes, rales or rhonchi noted. No significant pretibial edema. Patient does have chronic wounds to her lower extremity on the right. Does not appear acutely infected however.   Work-up in the emergency department as interpreted by me include CBC with no leukocytosis, left shift or significant anemia. Patient has WBC of 8.3 and stable hemoglobin at 11. CMP unremarkable with poor but stable renal function. Creatinine 4.7/BUN 23. Baseline creatinine between 5 and 7. No significant electrolyte abnormalities with potassium of 4.8 and sodium of 142. LFTs within normal limits. Urinalysis does not suggest acute infection. Viral testing negative for COVID and influenza. Lactic normal at 1.2. Troponin elevated at 110 with a repeat of 103, delta 7. Elevated troponin likely type II in the setting of end-stage renal disease. Lipase unremarkable at 8. Chest x-ray was obtained and notable for right upper lobe infiltrate. This was compared to previous x-ray and infiltrate seems to be worsening. Patient did become hypoxic at 83% on room air while in the ED at rest.  Nursing facility was contacted and patient does not require oxygen at baseline. Supplemental oxygen via nasal cannula applied at 2 L with improvement in sats above 95%. Patient did have recent hospitalization for aspiration pneumonia and was treated with IV Unasyn. Discussed case with clinical pharmacy who was suggested IV antibiotics with IV Zosyn and vancomycin. CT abdomen pelvis also obtained and generally unremarkable other than incidental finding of renal lesion. CT head unremarkable for acute intracranial abnormalities. Imaging is interpreted by me detailed below with official radiology read above. Patient was given doses for prophylactic coverage in the emergency department. Plan is for admission for further work-up and management. Patient accepted for admission by Dr. Heike Kirk. Patient was agreeable with plan after shared decision making. She remained hemodynamically stable in the ED.       Non-plain film images such as CT, Ultrasound and MRI are read by the radiologist. Plain radiographic images are visualized and preliminarily interpreted by the ED Provider with the below findings:    CT head with no obvious intracranial hemorrhage, large masses, midline shift or herniation. Chest x-ray with right upper lobe consolidation suggestive of pneumonia but no large pleural effusions or pneumothorax. Normal cardiac silhouette. CT abdomen pelvis with no evidence of free air, significant infectious/inflammatory changes, bowel obstruction or large obstructing renal stones. Discussion with Other Profesionals : Admitting Team who accepted patient for admission    Social Determinants : None    Records Reviewed : Inpatient Notes patient recently admitted to the hospital on 2/9/2023. She is residing at nursing home and was admitted for shortness of breath with concern for aspiration. Patient treated with Unasyn during hospitalization. Did receive dialysis while admitted. Discharged back to nursing facility after medically stable. Chronic conditions: chronic sacral pressure wounds and leg wounds, diabetes, hypertension and end-stage renal disease on hemodialysis     CONSULTS: Internal Medicine       Disposition:   Admission    Consultation with Dr. Alana Combs who accepted patient for admission. The patient will be admitted for further treatment and evaluation for   1. Pneumonia of right upper lobe due to infectious organism    2. Altered mental status, unspecified altered mental status type    3. Other fatigue    4.  Hypoxia          Re-Evaluations/Consultations:             ED Course as of 02/16/23 0612   Wed Feb 15, 2023   1208 EKG Interpretation  Interpreted by emergency department physician, Dr. Shauna Espinoza     2/15/23  Time: 0144    Rhythm: normal sinus  and 1 degree AV block  Rate: normal  Axis: normal  Conduction: 1st degree AV block  ST Segments: no acute change  T Waves: no acute change    Clinical Impression: Sinus rhythm, no acute ischemic changes, stable from prior  Comparison to Old EKG  Stable from prior EKG       [CD]      ED Course User Index  [CD] Renee Brambila MD         This patient's ED course included: History, physical examination, reevaluation prior to disposition    This patient has remained hemodynamically stable during their ED course. Counseling: The emergency provider has spoken with the patient and discussed todays results, in addition to providing specific details for the plan of care and counseling regarding the diagnosis and prognosis. Questions are answered at this time and they are agreeable with the plan.       --------------------------------- IMPRESSION AND DISPOSITION ---------------------------------    IMPRESSION  1. Pneumonia of right upper lobe due to infectious organism    2. Altered mental status, unspecified altered mental status type    3. Other fatigue    4. Hypoxia        DISPOSITION  Disposition: Admit to telemetry  Patient condition is stable        NOTE: This report was transcribed using voice recognition software.  Every effort was made to ensure accuracy; however, inadvertent computerized transcription errors may be present         Thai Cruz DO  Resident  02/16/23 6011

## 2023-02-16 ENCOUNTER — APPOINTMENT (OUTPATIENT)
Dept: CT IMAGING | Age: 73
End: 2023-02-16
Payer: MEDICARE

## 2023-02-16 PROBLEM — R41.82 AMS (ALTERED MENTAL STATUS): Status: ACTIVE | Noted: 2023-02-16

## 2023-02-16 LAB
ANION GAP SERPL CALCULATED.3IONS-SCNC: 17 MMOL/L (ref 7–16)
BLOOD CULTURE, ROUTINE: NORMAL
BUN BLDV-MCNC: 32 MG/DL (ref 6–23)
CALCIUM SERPL-MCNC: 9.1 MG/DL (ref 8.6–10.2)
CHLORIDE BLD-SCNC: 106 MMOL/L (ref 98–107)
CO2: 26 MMOL/L (ref 22–29)
CREAT SERPL-MCNC: 5.8 MG/DL (ref 0.5–1)
CULTURE, BLOOD 2: NORMAL
GFR SERPL CREATININE-BSD FRML MDRD: 7 ML/MIN/1.73
GLUCOSE BLD-MCNC: 143 MG/DL (ref 74–99)
METER GLUCOSE: 114 MG/DL (ref 74–99)
METER GLUCOSE: 160 MG/DL (ref 74–99)
METER GLUCOSE: 94 MG/DL (ref 74–99)
METER GLUCOSE: 98 MG/DL (ref 74–99)
POTASSIUM SERPL-SCNC: 4.7 MMOL/L (ref 3.5–5)
SODIUM BLD-SCNC: 149 MMOL/L (ref 132–146)

## 2023-02-16 PROCEDURE — 80048 BASIC METABOLIC PNL TOTAL CA: CPT

## 2023-02-16 PROCEDURE — 97161 PT EVAL LOW COMPLEX 20 MIN: CPT

## 2023-02-16 PROCEDURE — 97165 OT EVAL LOW COMPLEX 30 MIN: CPT

## 2023-02-16 PROCEDURE — 2060000000 HC ICU INTERMEDIATE R&B

## 2023-02-16 PROCEDURE — 97530 THERAPEUTIC ACTIVITIES: CPT

## 2023-02-16 PROCEDURE — 71250 CT THORAX DX C-: CPT

## 2023-02-16 PROCEDURE — 6370000000 HC RX 637 (ALT 250 FOR IP): Performed by: FAMILY MEDICINE

## 2023-02-16 PROCEDURE — 2700000000 HC OXYGEN THERAPY PER DAY

## 2023-02-16 PROCEDURE — 5A1D70Z PERFORMANCE OF URINARY FILTRATION, INTERMITTENT, LESS THAN 6 HOURS PER DAY: ICD-10-PCS | Performed by: ORTHOPAEDIC SURGERY

## 2023-02-16 PROCEDURE — 36415 COLL VENOUS BLD VENIPUNCTURE: CPT

## 2023-02-16 PROCEDURE — 2500000003 HC RX 250 WO HCPCS: Performed by: FAMILY MEDICINE

## 2023-02-16 PROCEDURE — 6360000002 HC RX W HCPCS: Performed by: FAMILY MEDICINE

## 2023-02-16 PROCEDURE — 90935 HEMODIALYSIS ONE EVALUATION: CPT

## 2023-02-16 PROCEDURE — S5553 INSULIN LONG ACTING 5 U: HCPCS | Performed by: FAMILY MEDICINE

## 2023-02-16 PROCEDURE — 2580000003 HC RX 258: Performed by: FAMILY MEDICINE

## 2023-02-16 PROCEDURE — 82962 GLUCOSE BLOOD TEST: CPT

## 2023-02-16 RX ORDER — ACETAMINOPHEN 325 MG/1
650 TABLET ORAL EVERY 6 HOURS PRN
Status: DISCONTINUED | OUTPATIENT
Start: 2023-02-16 | End: 2023-02-18 | Stop reason: HOSPADM

## 2023-02-16 RX ORDER — BUSPIRONE HYDROCHLORIDE 10 MG/1
10 TABLET ORAL DAILY
Status: DISCONTINUED | OUTPATIENT
Start: 2023-02-16 | End: 2023-02-18 | Stop reason: HOSPADM

## 2023-02-16 RX ORDER — LACTULOSE 10 G/15ML
10 SOLUTION ORAL DAILY PRN
Status: DISCONTINUED | OUTPATIENT
Start: 2023-02-16 | End: 2023-02-18 | Stop reason: HOSPADM

## 2023-02-16 RX ORDER — POLYETHYLENE GLYCOL 3350 17 G/17G
17 POWDER, FOR SOLUTION ORAL DAILY PRN
Status: DISCONTINUED | OUTPATIENT
Start: 2023-02-16 | End: 2023-02-18 | Stop reason: HOSPADM

## 2023-02-16 RX ORDER — BISACODYL 10 MG
10 SUPPOSITORY, RECTAL RECTAL DAILY PRN
Status: DISCONTINUED | OUTPATIENT
Start: 2023-02-16 | End: 2023-02-18 | Stop reason: HOSPADM

## 2023-02-16 RX ORDER — METOPROLOL SUCCINATE 25 MG/1
25 TABLET, EXTENDED RELEASE ORAL DAILY
Status: DISCONTINUED | OUTPATIENT
Start: 2023-02-16 | End: 2023-02-18 | Stop reason: HOSPADM

## 2023-02-16 RX ORDER — INSULIN LISPRO 100 [IU]/ML
0-8 INJECTION, SOLUTION INTRAVENOUS; SUBCUTANEOUS
Status: DISCONTINUED | OUTPATIENT
Start: 2023-02-16 | End: 2023-02-18 | Stop reason: HOSPADM

## 2023-02-16 RX ORDER — OXYCODONE HYDROCHLORIDE AND ACETAMINOPHEN 5; 325 MG/1; MG/1
1 TABLET ORAL EVERY 6 HOURS PRN
Status: DISCONTINUED | OUTPATIENT
Start: 2023-02-16 | End: 2023-02-18 | Stop reason: HOSPADM

## 2023-02-16 RX ORDER — SODIUM CHLORIDE 0.9 % (FLUSH) 0.9 %
5-40 SYRINGE (ML) INJECTION EVERY 12 HOURS SCHEDULED
Status: DISCONTINUED | OUTPATIENT
Start: 2023-02-16 | End: 2023-02-18 | Stop reason: HOSPADM

## 2023-02-16 RX ORDER — INSULIN GLARGINE-YFGN 100 [IU]/ML
6 INJECTION, SOLUTION SUBCUTANEOUS NIGHTLY
Status: DISCONTINUED | OUTPATIENT
Start: 2023-02-16 | End: 2023-02-18 | Stop reason: HOSPADM

## 2023-02-16 RX ORDER — SODIUM CHLORIDE 9 MG/ML
INJECTION, SOLUTION INTRAVENOUS PRN
Status: DISCONTINUED | OUTPATIENT
Start: 2023-02-16 | End: 2023-02-18 | Stop reason: HOSPADM

## 2023-02-16 RX ORDER — ACETAMINOPHEN 650 MG/1
650 SUPPOSITORY RECTAL EVERY 6 HOURS PRN
Status: DISCONTINUED | OUTPATIENT
Start: 2023-02-16 | End: 2023-02-18 | Stop reason: HOSPADM

## 2023-02-16 RX ORDER — INSULIN LISPRO 100 [IU]/ML
0-4 INJECTION, SOLUTION INTRAVENOUS; SUBCUTANEOUS NIGHTLY
Status: DISCONTINUED | OUTPATIENT
Start: 2023-02-16 | End: 2023-02-18 | Stop reason: HOSPADM

## 2023-02-16 RX ORDER — GUAIFENESIN 200 MG/10ML
200 LIQUID ORAL EVERY 6 HOURS PRN
Status: DISCONTINUED | OUTPATIENT
Start: 2023-02-16 | End: 2023-02-18 | Stop reason: HOSPADM

## 2023-02-16 RX ORDER — ESCITALOPRAM OXALATE 10 MG/1
10 TABLET ORAL DAILY
Status: DISCONTINUED | OUTPATIENT
Start: 2023-02-16 | End: 2023-02-18 | Stop reason: HOSPADM

## 2023-02-16 RX ORDER — SODIUM CHLORIDE 0.9 % (FLUSH) 0.9 %
5-40 SYRINGE (ML) INJECTION PRN
Status: DISCONTINUED | OUTPATIENT
Start: 2023-02-16 | End: 2023-02-18 | Stop reason: HOSPADM

## 2023-02-16 RX ORDER — POLYETHYLENE GLYCOL 3350 17 G/17G
17 POWDER, FOR SOLUTION ORAL DAILY PRN
Status: DISCONTINUED | OUTPATIENT
Start: 2023-02-16 | End: 2023-02-16 | Stop reason: SDUPTHER

## 2023-02-16 RX ORDER — TRAZODONE HYDROCHLORIDE 50 MG/1
25 TABLET ORAL NIGHTLY
Status: DISCONTINUED | OUTPATIENT
Start: 2023-02-16 | End: 2023-02-18 | Stop reason: HOSPADM

## 2023-02-16 RX ORDER — GABAPENTIN 300 MG/1
300 CAPSULE ORAL DAILY
Status: DISCONTINUED | OUTPATIENT
Start: 2023-02-16 | End: 2023-02-18 | Stop reason: HOSPADM

## 2023-02-16 RX ORDER — DEXTROSE MONOHYDRATE 100 MG/ML
INJECTION, SOLUTION INTRAVENOUS CONTINUOUS PRN
Status: DISCONTINUED | OUTPATIENT
Start: 2023-02-16 | End: 2023-02-18 | Stop reason: HOSPADM

## 2023-02-16 RX ORDER — SENNA PLUS 8.6 MG/1
2 TABLET ORAL 2 TIMES DAILY
Status: DISCONTINUED | OUTPATIENT
Start: 2023-02-16 | End: 2023-02-18 | Stop reason: HOSPADM

## 2023-02-16 RX ORDER — ONDANSETRON 4 MG/1
4 TABLET, FILM COATED ORAL EVERY 12 HOURS PRN
Status: DISCONTINUED | OUTPATIENT
Start: 2023-02-16 | End: 2023-02-16

## 2023-02-16 RX ADMIN — Medication 10 ML: at 20:57

## 2023-02-16 RX ADMIN — LACTULOSE 10 G: 20 SOLUTION ORAL at 01:17

## 2023-02-16 RX ADMIN — SENNOSIDES 17.2 MG: 8.6 TABLET, FILM COATED ORAL at 01:17

## 2023-02-16 RX ADMIN — GLUCAGON 1 MG: KIT at 01:17

## 2023-02-16 RX ADMIN — SENNOSIDES 17.2 MG: 8.6 TABLET, FILM COATED ORAL at 20:57

## 2023-02-16 RX ADMIN — MICONAZOLE NITRATE: 20.6 POWDER TOPICAL at 21:10

## 2023-02-16 RX ADMIN — TRAZODONE HYDROCHLORIDE 25 MG: 50 TABLET ORAL at 20:57

## 2023-02-16 RX ADMIN — METOPROLOL SUCCINATE 25 MG: 25 TABLET, EXTENDED RELEASE ORAL at 09:53

## 2023-02-16 RX ADMIN — ESCITALOPRAM OXALATE 10 MG: 10 TABLET, FILM COATED ORAL at 09:54

## 2023-02-16 RX ADMIN — GABAPENTIN 300 MG: 300 CAPSULE ORAL at 09:53

## 2023-02-16 RX ADMIN — OXYCODONE AND ACETAMINOPHEN 1 TABLET: 5; 325 TABLET ORAL at 09:53

## 2023-02-16 RX ADMIN — SENNOSIDES 17.2 MG: 8.6 TABLET, FILM COATED ORAL at 09:53

## 2023-02-16 RX ADMIN — OXYCODONE AND ACETAMINOPHEN 1 TABLET: 5; 325 TABLET ORAL at 20:57

## 2023-02-16 RX ADMIN — INSULIN GLARGINE-YFGN 6 UNITS: 100 INJECTION, SOLUTION SUBCUTANEOUS at 21:07

## 2023-02-16 RX ADMIN — BUSPIRONE HYDROCHLORIDE 10 MG: 10 TABLET ORAL at 09:53

## 2023-02-16 ASSESSMENT — PAIN SCALES - GENERAL
PAINLEVEL_OUTOF10: 0
PAINLEVEL_OUTOF10: 5
PAINLEVEL_OUTOF10: 2
PAINLEVEL_OUTOF10: 2

## 2023-02-16 ASSESSMENT — PAIN DESCRIPTION - LOCATION
LOCATION: BUTTOCKS;BACK
LOCATION: BACK;BUTTOCKS
LOCATION: BACK;BUTTOCKS

## 2023-02-16 ASSESSMENT — PAIN DESCRIPTION - DESCRIPTORS
DESCRIPTORS: ACHING;DISCOMFORT
DESCRIPTORS: ACHING;DISCOMFORT

## 2023-02-16 NOTE — PROGRESS NOTES
Occupational Therapy  OCCUPATIONAL THERAPY INITIAL EVALUATION      [facility] [address] [address] [address]       WTJW:                                                  Patient Name: Donna Whittaker  MRN: [id number]  : 1950  Room: [address] [place] [address]    Evaluating OT: Renetta Marinelli [place] #[id number]    Referring Provider[de-identified]  Nidia Paez MD    Specific Provider Orders/Date: OT evaluation and treatment 23    Diagnosis:  Pneumonia due to organism [J18.9]  Hypoxia [R09.02]  Altered mental status, unspecified altered mental status type [R41.82]  Pneumonia of right upper lobe due to infectious organism [J18.9]  Other fatigue [R53.83]  Pneumonia due to infectious organism, unspecified laterality, unspecified part of lung [J18.9]      Pertinent Medical History:  has a past medical history of Anemia in chronic kidney disease, Anxiety and depression, Arthritis, Chronic pain syndrome, COVID-19, COVID-19, Decreased dorsalis pedis pulse, Diabetes mellitus ([address] Dysphagia, oral phase, ESRD on hemodialysis ([address] GERD (gastroesophageal reflux disease), Hemodialysis patient ([address] Hyperkalemia, Hypertension, Hypertensive kidney disease with stage 4 chronic kidney disease ([address] Insomnia, Kidney stones, MDD (major depressive disorder), Moderate protein-calorie malnutrition ([address] Morbid obesity ([address] Muscle weakness (generalized), Obesity, Pressure injury of sacral region, stage 3 ([address] Pressure ulcer of sacral region, Sacral pressure ulcer, Unspecified osteoarthritis, unspecified site, and Weakness generalized.        Precautions:  Fall Risk, wounds, TAPS, O2, bed alarm,     Assessment of current deficits   [x] Functional mobility   [x]ADLs  [x] Strength               [x]Cognition   [x] Functional transfers   [] IADLs         [x] Safety Awareness   [x]Endurance   [] Fine Coordination              [x] Balance      [] Vision/perception   []Sensation    []Gross Motor Coordination  [] ROM  [] Delirium                   [] Motor Control     OT PLAN OF CARE   OT POC based on physician orders, patient diagnosis and results of clinical assessment    Frequency/Duration 1-3 days/wk for 2 weeks PRN   Specific OT Treatment to include:   * Instruction/training on adapted ADL techniques and AE recommendations to increase functional independence within precautions       * Training on energy conservation strategies, correct breathing pattern and techniques to improve independence/tolerance for self-care routine  * Functional transfer/mobility training/DME recommendations for increased independence, safety, and fall prevention  * Patient/Family education to increase follow through with safety techniques and functional independence  * Cognitive retraining/development of therapeutic activities to improve problem solving, judgement, memory, and attention for increased safety/participation in ADL/IADL tasks  * Therapeutic exercise to improve motor endurance, ROM, and functional strength for ADLs/functional transfers  * Therapeutic activities to facilitate/challenge dynamic balance, stand tolerance for increased safety and independence with ADLs  * Therapeutic activities to facilitate gross/fine motor skills for increased independence with ADLs  * Positioning to improve skin integrity, interaction with environment and functional independence    Recommended Adaptive Equipment:  TBD     Home Living:  Pt lives in a NH ([facility])  Prior Level of Function:  pt reported she was indep with feeding and grooming. Assist with bathing and dressing. Assist  with IADLs. Pt stated she was standing in the parallel bars. Roselinshalini Allisons transfers bed to w/c.      Pain Level: none at rest    Cognition: A&O: 4/4;   Follows multi step directions: fair    Memory:  fair    Sequencing:  good    Problem solving:  fair    Judgement/safety:  fair     Functional Assessment:   AM-PAC Daily Activity Raw Score: 12/24 Initial Eval Status  Date: 2/16/23 Treatment Status  Date: STG=LTG  Time frame: 10-14 days   Feeding Minimal Assist   To eat lunch/open containers  Independent    Grooming Minimal Assist   Modified [place]    UB Dressing Moderate Assist   gown  Stand by Assist    LB Dressing Dependent   socks  Maximal Assist    Bathing Maximal Assist  Moderate Assist    Toileting Dependent   Maximal Assist    Bed Mobility  Supine to sit: Minimal Assist   Sit to supine: Maximal Assist x 2  Supine to sit: Stand by Assist   Sit to supine: Moderate Assist    Functional Transfers Sit to stand: Moderate Assist x 2  Stand to sit: Moderate Assist x2  Stand pivot: NT   Minimal Assist    Functional Mobility NT   TBD   Balance Sitting: Contact Guard Assist      Standing: Moderate Assist x2  Sitting: Independent       Standing: Minimal Assist    Activity Tolerance Poor+  good   Visual/  Perceptual Glasses: no  Perception: NT          BUE  ROM/Strength/  Fine motor Coordination Hand dominance: R    RUE: ROM WFL     Strength: grossly 3-/5      Strength:  WFL     Coordination:  fair    LUE: ROM  WFL     Strength: grossly 3-/5      Strength:  WFL     Coordination: fair       Hearing: WFL   Sensation:  No c/o numbness or tingling   Tone:  WFL   Edema:  None noted    Comments:   RN cleared patient for OT. Upon arrival, patient was in bed. .  Therapist facilitated and instructed pt on adapted  techniques & compensatory strategies to improve safety and independence with basic ADLs, bed mobility,  functional transfers  to allow pt to achieve highest level of independence and safely. Patient presents with   decreased  ADLs,  bed mobility,  At end of session, patient in bed with call light and phone within reach, all lines and tubes intact. Pt would benefit from continued skilled OT to increase safety and independence with completion of ADL tasks and functional mobility for improved quality of life.        Treatment: OT treatment provided this date includes: ADL-  Instruction/training on safety and adapted techniques for completion of basic ADL at bed level. Mobility-  Instruction/training on safety and improved independence with bed mobility , functional transfers Therapist facilitated and provided cues for body alignment and hand/feet placement. Sitting EOB x 10 minutes to improve dynamic sitting balance and activity tolerance during ADLs. Skilled positioning/alignment-  Proper Positioning/Alignment in bed    Skilled monitoring of O2 sats-   SpO2 at rest 98%, SpO2 during activity 98%,       Rehab Potential: Good  for established goals     Patient / Family Goal:  Not stated    Patient and/or family were instructed on functional diagnosis, prognosis/goals and OT plan of care. Demonstrated fair  understanding. Eval Complexity: Low    Time In: 9:20  Time Out: 9:50  Total Treatment Time: 15 minutes    Min Units   OT Eval Low 74125  x     OT Eval Medium 16974      OT Eval High 07420       OT Re-Eval [id number]       Therapeutic Ex 30746       Therapeutic Activities 03825  10 1   ADL/Self Care 19453  5 0   Orthotic Management 04761       Neuro Re-Ed 85473       Non-Billable Time          Evaluation Time includes thorough review of current medical information, gathering information on past medical history/social history and prior level of function, completion of standardized testing/informal observation of tasks, assessment of data and education on plan of care and goals.             [place], [place] #[id number]

## 2023-02-16 NOTE — FLOWSHEET NOTE
02/16/23 1544   Vital Signs   BP (!) 124/35   Temp 96.9 °F (36.1 °C)   Heart Rate 68   Weight 202 lb 6.1 oz (91.8 kg)   Weight Method Bed scale   Percent Weight Change -2.86   Post-Hemodialysis Assessment   Post-Treatment Procedures Blood returned; Access bleeding time < 10 minutes   Machine Disinfection Process Acid/Vinegar Clean;Heat Disinfect; Exterior Machine Disinfection   Rinseback Volume (ml) 300 ml   Blood Volume Processed (Liters) 58.3 l/min   Dialyzer Clearance Lightly streaked   Duration of Treatment (minutes) 210 minutes   Heparin Amount Administered During Treatment (mL) 0 mL   Hemodialysis Intake (ml) 300 ml   Hemodialysis Output (ml) 2400 ml   NET Removed (ml) 2100   Tolerated Treatment Good   Patient Response to Treatment tolerated well, blood returned, needles pulled, sites held, stasis achieved, bandaids applied, +thirll, +bruit

## 2023-02-16 NOTE — CARE COORDINATION
Chart reviewed and case reviewed in IDR. Patient admitted from [address] [place] [address] where she was for rehab. Spoke with Malena Walls, liaison with [address] [place] [address]. Patient is a bed hold and can return when medically stable to do so. Patient is receiving HD treatments at [address] [place] [address]. Patient does need PT/OT notes, does not need to wait for authorization and does not need a COVID as she recently had COVID. Per Malena Walls, ultimately goal is to return to home, however, that was trialed and the patient fell and has a femur and wrist fracture. They recently received clearance for the patient to bear weight to her leg. Nursing notified of above. Call placed to [facility], patient's daughter re: transition of care. Per [facility], patient is receiving rehab at [address] [place] [address] and plan is to return there when medically stable to do so. Envelope and transfer paperwork completed, will need to ne signed and dated on the day of discharge. CIELO completed. Will continue to follow. Misha Enrique RN.  Monie Dorseyv:  [phone]    Addendum:  Call placed to Dr Shoshana Reed. Ct Chest and CT abdomen/pelvis results reviewed with him. He will review and place appropriate orders. CM will continue to follow. Misha Enrique RN. Case Management Assessment  Initial Evaluation    Date/Time of Evaluation: 2/16/2023 12:36 PM  Assessment Completed by: Misha Enrique RN    If patient is discharged prior to next notation, then this note serves as note for discharge by case management.     Patient Name: Cam Herr                   YOB: 1950  Diagnosis: Pneumonia due to organism [J18.9]  Hypoxia [R09.02]  Altered mental status, unspecified altered mental status type [R41.82]  Pneumonia of right upper lobe due to infectious organism [J18.9]  Other fatigue [R53.83]  Pneumonia due to infectious organism, unspecified laterality, unspecified part of lung [J18.9]                   Date / Time: 2/15/2023 11:33 AM    Patient Admission Status: Inpatient   Readmission Risk (Low < 19, Mod (19-27), High > 27): Readmission Risk Score: 28.4    Current PCP: Yahaira Singh MD  PCP verified by CM? Yes    Chart Reviewed: Yes      History Provided by: Child/Family  Patient Orientation: Person    Patient Cognition: Alert    Hospitalization in the last 30 days (Readmission):  Yes    If yes, Readmission Assessment in  Navigator will be completed. Advance Directives:      Code Status: DNR-CCA   Patient's Primary Decision Maker is: Legal Next of Kin    Primary Decision Maker (Active): Johnson Mcguire - Child - [phone]    Primary Decision Maker: Inga Mcguire - Child - [phone]    Discharge Planning:    Patient lives with: Other (Comment) Type of Home: Skilled Nursing Facility  Primary Care Giver: Family (daughter Elizabeth Manjarrez)  Patient Support Systems include: Children (rehab at [facility])   Current Financial resources:    Current community resources:    Current services prior to admission: [facility]            Current DME:              Type of Home Care services:  None    ADLS  Prior functional level: Assistance with the following:, Bathing, Dressing, Toileting, Cooking, Housework, Shopping, Mobility  Current functional level: Assistance with the following:, Bathing, Dressing, Toileting, Cooking, Housework, Mobility, Shopping    PT AM-PAC:   /24  OT AM-PAC:   /24    Family can provide assistance at DC: Would you like Case Management to discuss the discharge plan with any other family members/significant others, and if so, who?     Plans to Return to Present Housing: Yes  Other Identified Issues/Barriers to RETURNING to current housing: n/a  Potential Assistance needed at discharge: [facility]            Potential DME:    Patient expects to discharge to: [facility]  for transportation at discharge:      Financial    Payor: Ashley Mcintyre / Plan: Hans Mccartney / Product Type: *No Product type* / Does insurance require precert for SNF: No    Potential assistance Purchasing Medications: No  Meds-to-Beds request:        [facility] [address], [address] [place] [address]  [address] [place] [address]  Phone: [phone] Fax: [phone]      Notes:    Factors facilitating achievement of predicted outcomes: Family support, Cooperative, and Pleasant    Barriers to discharge: Cognitive deficit    Additional Case Management Notes: see above    The Plan for Transition of Care is related to the following treatment goals of Pneumonia due to organism [J18.9]  Hypoxia [R09.02]  Altered mental status, unspecified altered mental status type [R41.82]  Pneumonia of right upper lobe due to infectious organism [J18.9]  Other fatigue [R53.83]  Pneumonia due to infectious organism, unspecified laterality, unspecified part of lung [W06.9]    IF APPLICABLE: The Patient and/or patient representative Timmy Stanley and her family were provided with a choice of provider and agrees with the discharge plan. Freedom of choice list with basic dialogue that supports the patient's individualized plan of care/goals and shares the quality data associated with the providers was provided to:     Patient Representative Name:       The Patient and/or Patient Representative Agree with the Discharge Plan?       Hayley Romo RN  Case Management Department  Ph: [phone] Fax: [phone]

## 2023-02-16 NOTE — PROGRESS NOTES
Comprehensive Nutrition Assessment    Type and Reason for Visit:  Initial, Wound, Consult    Nutrition Recommendations/Plan:   Continue current diet. Recommend and start Jono BID, Nepro once daily to help meet increased nutrient needs for wound healing/dialysis. Will continue to monitor. Malnutrition Assessment:  Malnutrition Status: At risk for malnutrition (Comment) (02/16/23 1303)    Context:  Chronic Illness     Findings of the 6 clinical characteristics of malnutrition:  Energy Intake:  Unable to assess  Weight Loss:  No significant weight loss (note hx of CKD and possible fluid shifts; wt appears stable x 1 year)     Body Fat Loss:  No significant body fat loss     Muscle Mass Loss:  No significant muscle mass loss    Fluid Accumulation:  No significant fluid accumulation Extremities   Strength:  Not Performed    Nutrition Assessment:    Pt adm for AMS and fatigue. Noted possible PNA. PMHx includes DM, HTN, GERD, and CKD stage 4 on HD. Pt. is w/ increased nutrient needs d/t multiple wounds and dailysis. Will start ONS to support would healing. Nutrition Related Findings:    Oriented x1, BS+, abd (obese, soft, and round), [place] +2 RUE and LUE, Edema +1 RLE and LLE,  no I/O data, Wound Type: Multiple, Stage III, Stage IV, Wound Consult Pending (per doc flow wounds x3)       Current Nutrition Intake & Therapies:    Average Meal Intake: Unable to assess (UTO d/t lack of PO intake data)  Average Supplements Intake: None Ordered  ADULT DIET; Regular  ADULT ORAL NUTRITION SUPPLEMENT; Breakfast, Dinner; Wound Healing Oral Supplement  ADULT ORAL NUTRITION SUPPLEMENT; Lunch; Renal Oral Supplement    Anthropometric Measures:  Height: 5' 1\" (154.9 cm)  Ideal Body Weight (IBW): 105 lbs (48 kg)    Admission Body Weight: 195 lb 14.4 oz (88.9 kg) (2/16 Bed scale)  Current Body Weight: 195 lb 14.4 oz (88.9 kg) (2/16 Bed scale), 186.6 % IBW.  Weight Source: Bed Scale  Current BMI (kg/m2): 37  Usual Body Weight: (Note Hx of CKD and possbile fluid shidts/varied wt hx; appears stable overall x 1 year;)     Weight Adjustment For: No Adjustment                 BMI Categories: Obese Class 2 (BMI 35.0 -39.9)    Estimated Daily Nutrient Needs:  Energy Requirements Based On: Formula  Weight Used for Energy Requirements: Current  Energy (kcal/day): 1,600-1,800 kcals/day ( REE: 1332 x 1.2-1.3 SF)  Weight Used for Protein Requirements: Ideal  Protein (g/day): 72-87g/day (1.5-1.8g/kg) (As tolerated per CKD stage 4)  Method Used for Fluid Requirements: 1 ml/kcal  Fluid (ml/day): 1,600-1,800ml/day    Nutrition Diagnosis:   Increased nutrient needs related to increase demand for energy/nutrients as evidenced by wounds, dialysis    Nutrition Interventions:   Food and/or Nutrient Delivery: Continue Current Diet, Start Oral Nutrition Supplement (Jono BID, nepro ONS once daily)  Nutrition Education/Counseling: No recommendation at this time  Coordination of Nutrition Care: Continue to monitor while inpatient       Goals:     Goals: PO intake 50% or greater, by next RD assessment       Nutrition Monitoring and Evaluation:   Behavioral-Environmental Outcomes: None Identified  Food/Nutrient Intake Outcomes: Food and Nutrient Intake, Supplement Intake  Physical Signs/Symptoms Outcomes: Biochemical Data, GI Status, Nutrition Focused Physical Findings, Skin, Weight, Fluid Status or Edema    Discharge Planning:    Continue Oral Nutrition Supplement     Kristina Duque RD  Contact: ext [phone]

## 2023-02-16 NOTE — PROGRESS NOTES
Physical Therapy  Physical Therapy Initial Assessment     Name: Marcia Mandujano  : 1950  MRN: [id number]      Date of Service: 2023    Evaluating PT:  Yojana Del Rosario PT, DPT    Room #:  1632/0940-T  Diagnosis:  Pneumonia due to organism [J18.9]  Hypoxia [R09.02]  Altered mental status, unspecified altered mental status type [R41.82]  Pneumonia of right upper lobe due to infectious organism [J18.9]  Other fatigue [R53.83]  Pneumonia due to infectious organism, unspecified laterality, unspecified part of lung [J18.9]  PMHx/PSHx:  anxiety, depression, DM, dysphagia, GERD, HTN, CKD, obesity  Procedure/Surgery:  N/A  Precautions:  fall risk, bed bound, roger lift  Equipment Needs:  TBD    SUBJECTIVE:    Pt a questionable historian, reports she was admitted from  [address]. Pt completed transfers to w/c with roger lift, working with PT to  parallel bars. OBJECTIVE:   Initial Evaluation  Date: 23 Treatment Short Term/ Long Term   Goals   AM-PAC 6 Clicks      Was pt agreeable to Eval/treatment? yes     Does pt have pain?  No pain     Bed Mobility  Rolling: min A  Supine to sit: min A  Sit to supine: max Ax2  Scooting: min A  Rolling: mod I  Supine to sit: mod I  Sit to supine: mod I  Scooting: mod I   Transfers Sit to stand: mod Ax2  Stand to sit: mod Ax2  Stand pivot: NT  Sit to stand: min A  Stand to sit: min A  Stand pivot: min Ax2 with ww   Ambulation    NT  2' with AAD min Ax2   Stair negotiation: ascended and descended        Strength/ROM:   BLE grossly 3/5  BLE AROM WFL    Balance:   Static Sitting: SBA  Dynamic Sitting: CCGA  Static Standing: mod Ax2 with ww  Dynamic Standing: NT    Pt is A & O x 3  Sensation:  Pt denies numbness and tingling to extremities  Edema:  unremarkable    Vitals:  SpO2 and HR were stable during session    Therapeutic Exercises:    Bed mobility: supine<>sit, cued for EOB positioning  Transfers: STSx 2, cued for hand placement and postural correction  BLE AROM    Patient education  Pt educated on role of PT, importance of functional mobility during hospital stay, safety with functional mobility    Patient response to education:   Pt verbalized understanding Pt demonstrated skill Pt requires further education in this area   yes partial yes     ASSESSMENT:    Conditions Requiring Skilled Therapeutic Intervention:    [x]Decreased strength     []Decreased ROM  [x]Decreased functional mobility  [x]Decreased balance   [x]Decreased endurance   [x]Decreased posture  []Decreased sensation  []Decreased coordination   []Decreased vision  [x]Decreased safety awareness   []Increased pain       Comments:    Pt supine in bed upon entering, agreeable to participate. Pt instructed to transfer to EOB, completing transfer with assist of trunk. Pt sitting upright with good static sitting balance. Pt with fear of falling, requiring increased assistance to transfer from EOB. Pt was standing for ~15\" both reps with ww. Pt was assisted as able with hygiene and pad replacement. Pt was transferred back to supine with increased assistance 2/2 pt sitting too close to EOB. Pt was positioned for comfort, all needs met and call bell in reach prior to exiting.     Treatment:  Patient practiced and was instructed in the following treatment:    Bed mobility training - pt given verbal and tactile cues to facilitate proper sequencing and safety during rolling and supine>sit as well as provided with physical assistance to complete task   Sitting EOB for >5 minutes for upright tolerance, postural awareness and BLE ROM  STS and pivot transfer training - pt educated on proper hand and foot placement, safety and sequencing, and use of verbal and tactile cues to safely complete sit<>stand and pivot transfers with hands on assistance to complete task safely   Skilled positioning - Pt placed in the chair position with pillows utilized to facilitate upright posture, joint and skin integrity, and interaction with environment. Pt's/ family goals   1. Return to [facility]    Prognosis is fair for reaching above PT goals. Patient and or family understand(s) diagnosis, prognosis, and plan of care. yes    PHYSICAL THERAPY PLAN OF CARE:    PT POC is established based on physician order and patient diagnosis     Referring provider/PT Order:  Joy Stone MD  Diagnosis:  Pneumonia due to organism [J18.9]  Hypoxia [R09.02]  Altered mental status, unspecified altered mental status type [R41.82]  Pneumonia of right upper lobe due to infectious organism [J18.9]  Other fatigue [R53.83]  Pneumonia due to infectious organism, unspecified laterality, unspecified part of lung [J18.9]  Specific instructions for next treatment:  Progress as tolerated    Current Treatment Recommendations:     [x] Strengthening to improve independence with functional mobility   [] ROM to improve independence with functional mobility   [x] Balance Training to improve static/dynamic balance and to reduce fall risk  [x] Endurance Training to improve activity tolerance during functional mobility   [x] Transfer Training to improve safety and independence with all functional transfers   [x] Gait Training to improve gait mechanics, endurance and assess need for appropriate assistive device  [] Stair Training in preparation for safe discharge home and/or into the community   [x] Positioning to prevent skin breakdown and contractures  [x] Safety and Education Training   [x] Patient/Caregiver Education   [] HEP  [] Other     PT long term treatment goals are located in above grid    Frequency of treatments: 2-5x/week x 1-2 weeks.     Time in  0920  Time out  0945    Total Treatment Time  10 minutes     Evaluation Time includes thorough review of current medical information, gathering information on past medical history/social history and prior level of function, completion of standardized testing/informal observation of tasks, assessment of data and education on plan of care and goals.     CPT codes:  [x] Low Complexity PT evaluation 58881  [] Moderate Complexity PT evaluation 51363  [] High Complexity PT evaluation 27855  [] PT Re-evaluation 33746  [] Gait training 39672 -- minutes  [] Manual therapy [phone] -- minutes  [x] Therapeutic activities 85871 10 minutes  [] Therapeutic exercises 41064 -- minutes  [] Neuromuscular reeducation 27507 -- minutes     Leda Conley, PT, DPT  [id number]

## 2023-02-16 NOTE — ACP (ADVANCE CARE PLANNING)
Advance Care Planning   Healthcare Decision Maker:    Primary Decision Maker (Active): Johnson Mcguire - Child - [phone]    Primary Decision Maker: Inga Mcguire - Child - [phone]    Click here to complete Healthcare Decision Makers including selection of the Healthcare Decision Maker Relationship (ie \"Primary\").

## 2023-02-16 NOTE — FLOWSHEET NOTE
Inpatient Wound Care(initial evaluation) [id number]    Admit Date: 2/15/2023 11:33 AM    Reason for consult:  coccyx, legs    Significant history:  per H & P  CHIEF COMPLAINT:  Altered mental status, questionable pneumonia. HISTORY OF PRESENTING ILLNESS:  This is a 65-year-old who has had multiple admissions. Usually, she gets somewhat confused at the nursing home and is sent in, and then workup reveals questionable pneumonia and she is admitted. Basically, same thing happened yesterday, was initially seen for altered mental status, supposedly lethargic and fatigued. Chest x-ray which was a poor-quality portable chest x-ray suggested pneumonia, maybe worsening from last admission which was just about a week ago. The patient did have a CAT scan on 02/09 which did not show much. However, the patient is admitted for possible pneumonia. She does have a slightly elevated procalcitonin level. This morning, she is actually lying in Trendelenburg position because the wound nurse is assessing her wounds and she does not appear to be short of breath. She is awake and alert x3.       Wound history:  admitted with wounds    Findings:    02/16/23 0850   Skin Integrity Site 2   Skin Integrity Location 2 Ecchymosis   Location 2 BUE   Skin Integrity Site 3   Skin Integrity Location 3   (dry flaky skin)    Location 3 feet   Skin Integrity Site 4   Skin Integrity Location 4 Redness;Rash   Location 4 abdominal fold   Wound 02/09/23 Knee Right;Lateral   Date First Assessed/Time First Assessed: 02/09/23 1912   Present on Hospital Admission: Yes  Location: Knee  Wound Location Orientation: Right;Lateral   Wound Image    Dressing/Treatment Alginate;Dry dressing;Roll gauze  (nurse to apply)   Wound Length (cm) 3 cm   Wound Width (cm) 1.8 cm   Wound Depth (cm) 0.8 cm   Wound Surface Area (cm^2) 5.4 cm^2   Change in Wound Size % (l*w) -92.86   Wound Volume (cm^3) 4.32 cm^3   Wound Healing % -286   Wound Assessment Pink/red;Slough Drainage Amount Scant   Drainage Description Serosanguinous   Odor None   Carmen-wound Assessment Dry/flaky   Wound 02/09/23 Leg Right; Lower   Date First Assessed/Time First Assessed: 02/09/23 1912   Present on Hospital Admission: Yes  Primary Wound Type: Pressure Injury  Location: Leg  Wound Location Orientation: Right; Lower   Wound Image    Dressing/Treatment Alginate;Dry dressing;Roll gauze   Wound Length (cm) 1.8 cm   Wound Width (cm) 1 cm   Wound Depth (cm) 0.1 cm   Wound Surface Area (cm^2) 1.8 cm^2   Change in Wound Size % (l*w) 65.52   Wound Volume (cm^3) 0.18 cm^3   Wound Healing % 96   Wound Assessment Pink/red   Drainage Amount Scant   Drainage Description Serosanguinous   Odor None   Carmen-wound Assessment Dry/flaky   Wound 07/09/22 Coccyx   Date First Assessed/Time First Assessed: 07/09/22 0345   Present on Hospital Admission: Yes  Primary Wound Type: Pressure Injury  Location: Coccyx   Wound Image    Wound Etiology Pressure Stage 4   Dressing Status Intact  (lifted to assess)   Dressing/Treatment ABD;Dry dressing;Moist to dry   Wound Length (cm) 6 cm   Wound Width (cm) 4.2 cm   Wound Depth (cm) 3 cm   Wound Surface Area (cm^2) 25.2 cm^2   Change in Wound Size % (l*w) -188   Wound Volume (cm^3) 75.6 cm^3   Wound Healing % -476   Wound Assessment [place]/red;Slough   Drainage Amount Scant   Drainage Description Serosanguinous   Odor None   Carmen-wound Assessment   (chronic discoloration)     **Informed Consent**    The patient has given verbal consent to have photos taken of wounds and inserted into their chart as part of their permanent medical record for purposes of documentation, treatment management and/or medical review. All Images taken on 2/16/23 of patient name: Aaron Aas were transmitted and stored on secured [address] [employer] located within [facility] by a registered [employer] Mobile Application Device.       Impression:  coccyx stage 4    Plan:  Opticell to right knee, leg  Moist Kerlix to coccyx  Will need continued preventative care  Dietary consult  Antifungal to folds  Low air loss module    Nimisha Yee, TOBIAS 2/16/2023 3:59 PM

## 2023-02-16 NOTE — CONSULTS
[facility]  Nephrology Consult Note    Patient's Name: Renee Barnett    Reason for Consult: Hemodialysis    Chief Complaint: Fatigue, lethargy  History Obtained From:  patient, past medical records, and EMR    History of Present Illness:    Renee Barnett is a 67 y.o. female with a past medical history of COVID, hypertension, ESRD, and anemia. She presented to the ED on 2/15 with complaints of fatigue and lethargy. Vital signs on 2/15 includes temperature 97.6, respirations 16, pulse 82, /75, and she was 92% on room air. Lab data on 2/15 includes CO2 30, creatinine 4.7, albumin 3.2, and hemoglobin 11. She had a chest x-ray on 2/15 which showed pulmonary infiltrates on the right. CT of the head on 2/15 showed no acute intracranial abnormality. CT abdomen pelvis 2/15 showed inferior right upper lobe and bilateral lower lobe infiltrates, decubitus ulcer with erosive change suspicious for osteomyelitis. CT chest 2/16 showed moderate bilateral pleural effusions and new bilateral lung infiltrates. We were consulted to see the patient for hemodialysis. Patient is known to our service and dialyzes at the dialysis den at [facility]. At present, patient was seen and examined. She reports that she feels alright. She reports that she came in because she was believed to be lethargic. She denies any chest pain or shortness of breath. She reports intermittent nausea. She denies any abdominal pain, vomiting, or diarrhea. She denies any fevers or chills. She denies any dizziness. She reports that her appetite has been fair.     PMH:    Past Medical History:   Diagnosis Date    Anemia in chronic kidney disease     Anxiety and depression     Arthritis     Chronic pain syndrome     COVID-19 05/2020    COVID-19     Decreased dorsalis pedis pulse 10/25/2022    Diabetes mellitus ([address]     Dysphagia, oral phase     ESRD on hemodialysis ([address] 10/25/2022    GERD (gastroesophageal reflux disease)     Hemodialysis patient [facility])     M-W-F    Hyperkalemia     Hypertension     Hypertensive kidney disease with stage 4 chronic kidney disease (HCC)     Insomnia     Kidney stones     MDD (major depressive disorder)     Moderate protein-calorie malnutrition (HCC)     Morbid obesity (HCC)     Muscle weakness (generalized)     Obesity     Pressure injury of sacral region, stage 3 ([address] 10/25/2022    Pressure ulcer of sacral region     Sacral pressure ulcer 08/03/2021    stage 4    Unspecified osteoarthritis, unspecified site     Weakness generalized        Patient Active Problem List   Diagnosis    Neurologic gait dysfunction    Diabetes mellitus ([address]    Hypertension    Arthritis    Knee problem    Anemia due to stage 3 chronic kidney disease    Primary osteoarthritis of both knees    Moderate protein-calorie malnutrition (HCC)    Pressure injury of sacral region, stage 4 (HCC)    Pressure injury of right hip, stage 3 (HCC)    Left bundle branch block    Moderate obesity    Failure to thrive in adult    Pressure injury of sacral region, stage 3 (HCC)    Decreased dorsalis pedis pulse    ESRD on hemodialysis ([address]    Closed bicondylar fracture of distal femur, right, initial encounter ([address]    Other fracture of right femur, initial encounter for closed fracture ([address]    Closed fracture of distal ends of left radius and ulna    Closed bicondylar fracture of distal end of right femur ([address]    Metabolic encephalopathy    Acute respiratory failure with hypoxia ([address]    Pneumonia due to infectious organism, unspecified laterality, unspecified part of lung    Pneumonia due to organism    AMS (altered mental status)       Diet:    ADULT DIET;  Regular    Meds:     busPIRone  10 mg Oral Daily    escitalopram  10 mg Oral Daily    gabapentin  300 mg Oral Daily    insulin glargine-yfgn  6 Units SubCUTAneous Nightly    metoprolol succinate  25 mg Oral Daily    senna  2 tablet Oral BID    traZODone  25 mg Oral Nightly    sodium chloride flush  5-40 mL IntraVENous 2 times per day    insulin lispro  0-8 Units SubCUTAneous TID WC    insulin lispro  0-4 Units SubCUTAneous Nightly    miconazole   Topical BID        sodium chloride      dextrose         Meds prn:     bisacodyl, guaiFENesin, lactulose, oxyCODONE-acetaminophen, sodium chloride flush, sodium chloride, acetaminophen **OR** acetaminophen, glucose, dextrose bolus **OR** dextrose bolus, glucagon (rDNA), dextrose, polyethylene glycol    Meds prior to admission:     No current facility-administered medications on file prior to encounter.      Current Outpatient Medications on File Prior to Encounter   Medication Sig Dispense Refill    glucose 4 g chewable tablet Take 4 tablets by mouth as needed for Low blood sugar 60 tablet 3    ferric citrate (AURYXIA) 1  MG(Fe) TABS tablet Take 420 mg by mouth 3 times daily (with meals)      benzonatate (TESSALON) 100 MG capsule Take 100 mg by mouth 3 times daily as needed for Cough      bisacodyl (DULCOLAX) 10 MG suppository Place 10 mg rectally daily as needed for Constipation      sodium phosphate (FLEET) 7-19 GM/118ML Place 1 enema rectally once as needed (constipation)      glucose (GLUTOSE) 40 % GEL Take 15 g by mouth as needed (hypoglycemia)      guaiFENesin 200 MG/5ML LIQD Take 200 mg by mouth every 6 hours as needed (cough)      insulin lispro, 1 Unit Dial, (HUMALOG/ADMELOG) 100 UNIT/ML SOPN Inject 0-10 Units into the skin 3 times daily (before meals) *Per Sliding Scale*      lactulose (CHRONULAC) 10 GM/15ML solution Take 10 g by mouth daily as needed (constipation)      busPIRone (BUSPAR) 10 MG tablet Take 10 mg by mouth daily      polyethylene glycol (GLYCOLAX) 17 g packet Take 17 g by mouth daily as needed for Constipation      oxyCODONE-acetaminophen (PERCOCET) 5-325 MG per tablet Take 1 tablet by mouth every 6 hours as needed for Pain.      epoetin derek-epbx (RETACRIT) 19565 UNIT/ML SOLN injection Inject 10,000 Units into the skin See Admin Instructions Given Tuesday,Thursday,Saturday      senna (SENOKOT) 8.6 MG tablet Take 2 tablets by mouth 2 times daily      traZODone (DESYREL) 50 MG tablet Take 25 mg by mouth nightly      acetaminophen (TYLENOL) 325 MG tablet Take 650 mg by mouth every 4 hours as needed for Pain or Fever      vitamin D (CHOLECALCIFEROL) 25 MCG (1000 UT) TABS tablet Take 1,000 Units by mouth daily      ondansetron (ZOFRAN) 4 MG tablet Take 4 mg by mouth every 12 hours as needed for Nausea or Vomiting      insulin glargine (LANTUS) 100 UNIT/ML injection vial Inject 6 Units into the skin nightly 10 mL 3    midodrine (PROAMATINE) 5 MG tablet Take 15 mg by mouth See Admin Instructions Given Monday,Wednesday,Friday      metoprolol succinate (TOPROL XL) 25 MG extended release tablet Take 1 tablet by mouth daily 30 tablet 3    escitalopram (LEXAPRO) 10 MG tablet Take 10 mg by mouth daily      gabapentin (NEURONTIN) 300 MG capsule Take 300 mg by mouth daily. Allergies:    Patient has no known allergies. Social History:     reports that she quit smoking about 11 years ago. Her smoking use included cigarettes. She has a 30.00 pack-year smoking history. She has never used smokeless tobacco. She reports that she does not drink alcohol and does not use drugs. Family History:         Problem Relation Age of Onset    Cancer Sister        Review of Systems:   Pertinent items are noted in HPI.     Physical Exam:      Patient Vitals for the past 24 hrs:   BP Temp Temp src Pulse Resp SpO2 Height Weight   02/16/23 0945 (!) 162/90 98 °F (36.7 °C) Axillary 80 18 97 % -- --   02/16/23 0141 -- -- -- -- -- -- 5' 1\" (1.549 m) 195 lb 14.4 oz (88.9 kg)   02/16/23 0045 (!) 157/72 98.2 °F (36.8 °C) Oral 68 18 98 % -- --   02/15/23 2317 (!) 147/69 97.8 °F (36.6 °C) Oral 69 19 99 % -- --   02/15/23 1915 (!) 177/85 -- -- 67 -- 96 % -- --   02/15/23 1850 (!) 183/79 -- -- 73 20 94 % -- --   02/15/23 1735 134/66 -- -- 71 18 98 % -- --   02/15/23 1642 (!) 140/83 -- -- 89 -- 94 % -- --   02/15/23 1137 (!) 203/75 97.6 °F (36.4 °C) Oral 82 16 92 % -- --       No intake or output data in the 24 hours ending 02/16/23 1002    General: Awake, alert, no acute distress  Neck: No JVD noted  Lungs: Clear bilaterally upper, diminished to the bases bilaterally. Unlabored  CV: Regular rate and rhythm. No rub  Abd: Soft, nontender, nondistended. Active bowel sounds  Skin: Warm and dry. No rash on exposed extremities; dressing noted to RLE  Ext: Trace BLE edema   Neuro: Awake, answers questions appropriately    Data:    Recent Labs     02/13/23  1200 02/15/23  1155   WBC 7.4 8.3   HGB 10.9* 11.0*   HCT 34.3 34.8   .9* 108.1*    276       Recent Labs     02/13/23  1200 02/15/23  1155    142   K 5.8* 4.8    102   CO2 22 30*   CREATININE 6.9* 4.7*   BUN 45* 23   LABGLOM 6 9   GLUCOSE 201* 107*   CALCIUM 9.8 9.7       Vit D, 25-Hydroxy   Date Value Ref Range Status   11/11/2022 32 30 - 100 ng/mL Final     Comment:     <20 ng/mL. ........... Blaire Rast Deficient  20-30 ng/mL. ......... Blaire Rast Insufficient   ng/mL. ........ Blaire Rast Sufficient  >100 ng/mL. .......... Blaire Rast Toxic         PTH   Date Value Ref Range Status   11/06/2022 466 (H) 15 - 65 pg/mL Final       Recent Labs     02/13/23  1200 02/15/23  1155   ALT <5 7   AST 16 15   ALKPHOS 73 76   BILITOT <0.2 0.4       Recent Labs     02/13/23  1200 02/15/23  1155   LABALBU 3.3* 3.2*       Ferritin   Date Value Ref Range Status   05/05/2021 767 ng/mL Final     Comment:     FERRITIN Reference Ranges:  Adult Males   20 - 60 years:    30 - 400 ng/mL  Adult females 16 - 61 years:    15 - 150 ng/mL  Adults greater than 60 years:   no established reference range  Pediatrics:                     no established reference range       Iron   Date Value Ref Range Status   05/05/2021 74 37 - 145 mcg/dL Final     TIBC   Date Value Ref Range Status   05/05/2021 116 (L) 250 - 450 mcg/dL Final       Vitamin B-12   Date Value Ref Range Status   05/05/2021 620 211 - 946 pg/mL Final       Folate   Date Value Ref Range Status   05/05/2021 >20.0 4.8 - 24.2 ng/mL Final       Lab Results   Component Value Date/Time    COLORU Yellow 02/15/2023 11:55 AM    NITRU Negative 02/15/2023 11:55 AM    GLUCOSEU 100 02/15/2023 11:55 AM    GLUCOSEU NEGATIVE 08/17/2011 10:30 PM    KETUA Negative 02/15/2023 11:55 AM    UROBILINOGEN 0.2 02/15/2023 11:55 AM    BILIRUBINUR Negative 02/15/2023 11:55 AM    BILIRUBINUR NEGATIVE 08/17/2011 10:30 PM       No results found for: MAGDA, CREURRAN, MACREATRATIO, OSMOU    No components found for: URIC    No results found for: LIPIDPAN    Assessment and Plans:    ESRD on HD  Outpatient at the dialysis den at [facility] HD while inpatient     2. Bilateral pleural effusion  CT chest 2/16 moderate bilateral pleural effusions and new bilateral lung infiltrates  On Omnicef  Management per primary service     3.   Anemia of chronic kidney disease  Hemoglobin target 10-12  Hemoglobin 11 on 2/15-at target  No ARUN as hemoglobin at target  Transfuse for hemoglobin<7  Monitor H&H     4. secondary hyperparathyroidism of renal origin   and phosphorus 5 on 2/6 in the outpatient setting  Check Phos in the am  Monitor labs    5. wound  CT abd pelvis 2/15 decubitus ulcer with erosive change suspicious for osteomyelitis  Management per primary service    Gloria Gastelum, PABLO - CNP

## 2023-02-16 NOTE — H&P
[address]                  [address] [facility]  [facility]                              HISTORY AND PHYSICAL    PATIENT NAME: Allison Thomason                      :        1950  MED REC NO:   [id number]                            ROOM:       [id number]  ACCOUNT NO:   [de-identified]                           ADMIT DATE: 02/15/2023  PROVIDER:     Catherine Espinosa MD    CHIEF COMPLAINT:  Altered mental status, questionable pneumonia. HISTORY OF PRESENTING ILLNESS:  This is a 65-year-old who has had  multiple admissions. Usually, she gets somewhat confused at the nursing  home and is sent in, and then workup reveals questionable pneumonia and  she is admitted. Basically, same thing happened yesterday, was  initially seen for altered mental status, supposedly lethargic and  fatigued. Chest x-ray which was a poor-quality portable chest x-ray  suggested pneumonia, maybe worsening from last admission which was just  about a week ago. The patient did have a CAT scan on  which did  not show much. However, the patient is admitted for possible pneumonia. She does have a slightly elevated procalcitonin level. This morning,  she is actually lying in Trendelenburg position because the wound nurse  is assessing her wounds and she does not appear to be short of breath. She is awake and alert x3. She denies cough. She has not had any fever  at this point. RECENT AND PRESENT MEDICATION:  See med rec in the chart. PAST MEDICAL HISTORY:  She has a remote history of sarcoidosis. She has  chronic kidney disease - on hemodialysis, hypertension, orthostatic  hypotension, diabetes. SOCIAL HISTORY:  She is residing at the skilled nursing facility. Does  not drink. Does not smoke. ALLERGIES:  She has no allergies. REVIEW OF SYSTEMS:  SKIN AND LYMPHATICS:  She has chronic ongoing sacral decubiti for quite  some time, otherwise no other skin issues.   CENTRAL NERVOUS SYSTEM:  She denies headache, blurred vision.  Reported  to be altered yesterday.  Appears to be fine today.  CIRCULATORY:  Denies chest pain, orthopnea, PND at this time.  DIGESTIVE:  Chronic constipation but she states her bowels have been  moving.  Denies any abdominal pain at this time.  GENITOURINARY:  Chronic hemodialysis.  Denies dysuria, frequency,  hematuria.  MUSCULOSKELETAL:  She has had multiple admissions for  fractures and recent fracture surgery for femur.  Appears to be stable.   She does have pain medication as needed.    PHYSICAL EXAMINATION:  GENERAL:  Once again, the patient is lying flat actually in  Trendelenburg position with no shortness of breath.  She appears to be  alert and oriented x3.  VITAL SIGNS:  Her temperature is 98.  Her pulse is 68.  Her respirations  are 18, nonlabored.  Blood pressure 157/72.  O2 sat is 98% on 2 L.  SKIN:  Shows pallor but no jaundice.  NECK:  Supple.  HEART:  Regular rate and rhythm.  CHEST:  Anteriorly sounds clear without wheezing, rhonchi or rales.  ABDOMEN:  Obese.  She does have a known umbilical hernia, which is  nontender at this time.  EXTREMITIES:  Reveal minimal edema.  She does have a couple of open  wounds on her right leg.  She does have chronic sacral decubiti, which  was present on admission.    LABORATORY DATA:  Reveal a BUN of 23, a creatinine of 4.7.  Electrolytes  were normal.  Lactic acid was normal.  Sugar was 107, hemoglobin 11.   White count normal at 8.3, platelet count was 276,000.  Procalcitonin  was elevated at 1.66.    A CAT scan of her head which showed no change.  A CAT scan of her  abdomen which showed probably some constipation but no other major  changes.  CAT scan of the chest is pending.  Portable chest x-ray  yesterday suggested pneumonia but once again portable of very poor  quality at this time.    DIAGNOSTIC IMPRESSION:  Altered mental status as described by the  nursing home.  She is completely alert and oriented  at this point. Questionable pneumonia. Procalcitonin is somewhat elevated, so the plan  is to do a CAT scan to try to determine whether she really has pneumonia  or not. Continue current medication. I suspect quick turnaround back  to the skilled nursing facility here shortly. If chest CT does show  pneumonia which is changed from 02/09/2023, we will resume antibiotics.         Yusuf Ca MD    D: 02/16/2023 9:00:17       T: 02/16/2023 9:03:59     [email or web address]  Job#: [id number]     Doc#: [id number]    CC:

## 2023-02-17 ENCOUNTER — APPOINTMENT (OUTPATIENT)
Dept: INTERVENTIONAL RADIOLOGY/VASCULAR | Age: 73
End: 2023-02-17
Payer: MEDICARE

## 2023-02-17 LAB
ANION GAP SERPL CALCULATED.3IONS-SCNC: 11 MMOL/L (ref 7–16)
BUN BLDV-MCNC: 16 MG/DL (ref 6–23)
CALCIUM SERPL-MCNC: 8.9 MG/DL (ref 8.6–10.2)
CHLORIDE BLD-SCNC: 102 MMOL/L (ref 98–107)
CO2: 32 MMOL/L (ref 22–29)
CREAT SERPL-MCNC: 3.9 MG/DL (ref 0.5–1)
GFR SERPL CREATININE-BSD FRML MDRD: 12 ML/MIN/1.73
GLUCOSE BLD-MCNC: 85 MG/DL (ref 74–99)
HCT VFR BLD CALC: 30.4 % (ref 34–48)
HEMOGLOBIN: 9.5 G/DL (ref 11.5–15.5)
MAGNESIUM: 1.9 MG/DL (ref 1.6–2.6)
MCH RBC QN AUTO: 35.7 PG (ref 26–35)
MCHC RBC AUTO-ENTMCNC: 31.3 % (ref 32–34.5)
MCV RBC AUTO: 114.3 FL (ref 80–99.9)
METER GLUCOSE: 107 MG/DL (ref 74–99)
METER GLUCOSE: 141 MG/DL (ref 74–99)
METER GLUCOSE: 87 MG/DL (ref 74–99)
METER GLUCOSE: 95 MG/DL (ref 74–99)
PDW BLD-RTO: 15.4 FL (ref 11.5–15)
PHOSPHORUS: 3.9 MG/DL (ref 2.5–4.5)
PLATELET # BLD: 198 E9/L (ref 130–450)
PMV BLD AUTO: 10.8 FL (ref 7–12)
POTASSIUM SERPL-SCNC: 3.8 MMOL/L (ref 3.5–5)
RBC # BLD: 2.66 E12/L (ref 3.5–5.5)
SODIUM BLD-SCNC: 145 MMOL/L (ref 132–146)
URINE CULTURE, ROUTINE: NORMAL
WBC # BLD: 4.9 E9/L (ref 4.5–11.5)

## 2023-02-17 PROCEDURE — 80048 BASIC METABOLIC PNL TOTAL CA: CPT

## 2023-02-17 PROCEDURE — 82962 GLUCOSE BLOOD TEST: CPT

## 2023-02-17 PROCEDURE — S5553 INSULIN LONG ACTING 5 U: HCPCS | Performed by: FAMILY MEDICINE

## 2023-02-17 PROCEDURE — 84100 ASSAY OF PHOSPHORUS: CPT

## 2023-02-17 PROCEDURE — 85027 COMPLETE CBC AUTOMATED: CPT

## 2023-02-17 PROCEDURE — 6370000000 HC RX 637 (ALT 250 FOR IP): Performed by: FAMILY MEDICINE

## 2023-02-17 PROCEDURE — 83735 ASSAY OF MAGNESIUM: CPT

## 2023-02-17 PROCEDURE — 36415 COLL VENOUS BLD VENIPUNCTURE: CPT

## 2023-02-17 PROCEDURE — 2060000000 HC ICU INTERMEDIATE R&B

## 2023-02-17 PROCEDURE — 2580000003 HC RX 258: Performed by: FAMILY MEDICINE

## 2023-02-17 PROCEDURE — 6370000000 HC RX 637 (ALT 250 FOR IP): Performed by: INTERNAL MEDICINE

## 2023-02-17 RX ORDER — AMOXICILLIN AND CLAVULANATE POTASSIUM 500; 125 MG/1; MG/1
1 TABLET, FILM COATED ORAL
Status: DISCONTINUED | OUTPATIENT
Start: 2023-02-17 | End: 2023-02-18 | Stop reason: HOSPADM

## 2023-02-17 RX ADMIN — AMOXICILLIN AND CLAVULANATE POTASSIUM 1 TABLET: 500; 125 TABLET, FILM COATED ORAL at 16:38

## 2023-02-17 RX ADMIN — SENNOSIDES 17.2 MG: 8.6 TABLET, FILM COATED ORAL at 10:20

## 2023-02-17 RX ADMIN — OXYCODONE AND ACETAMINOPHEN 1 TABLET: 5; 325 TABLET ORAL at 21:36

## 2023-02-17 RX ADMIN — Medication 10 ML: at 10:28

## 2023-02-17 RX ADMIN — BUSPIRONE HYDROCHLORIDE 10 MG: 10 TABLET ORAL at 10:20

## 2023-02-17 RX ADMIN — GABAPENTIN 300 MG: 300 CAPSULE ORAL at 10:20

## 2023-02-17 RX ADMIN — ESCITALOPRAM OXALATE 10 MG: 10 TABLET, FILM COATED ORAL at 10:21

## 2023-02-17 RX ADMIN — METOPROLOL SUCCINATE 25 MG: 25 TABLET, EXTENDED RELEASE ORAL at 10:20

## 2023-02-17 RX ADMIN — Medication 10 ML: at 21:37

## 2023-02-17 RX ADMIN — SENNOSIDES 17.2 MG: 8.6 TABLET, FILM COATED ORAL at 21:36

## 2023-02-17 RX ADMIN — TRAZODONE HYDROCHLORIDE 25 MG: 50 TABLET ORAL at 21:36

## 2023-02-17 RX ADMIN — INSULIN GLARGINE-YFGN 6 UNITS: 100 INJECTION, SOLUTION SUBCUTANEOUS at 21:45

## 2023-02-17 RX ADMIN — OXYCODONE AND ACETAMINOPHEN 1 TABLET: 5; 325 TABLET ORAL at 10:20

## 2023-02-17 RX ADMIN — MICONAZOLE NITRATE: 20.6 POWDER TOPICAL at 10:25

## 2023-02-17 RX ADMIN — MICONAZOLE NITRATE: 20.6 POWDER TOPICAL at 21:35

## 2023-02-17 ASSESSMENT — PAIN DESCRIPTION - DESCRIPTORS: DESCRIPTORS: BURNING

## 2023-02-17 ASSESSMENT — PAIN SCALES - GENERAL: PAINLEVEL_OUTOF10: 7

## 2023-02-17 ASSESSMENT — PAIN DESCRIPTION - LOCATION: LOCATION: COCCYX

## 2023-02-17 NOTE — PROGRESS NOTES
Changed dress to coccyx. Patient would not allow me to wrap right know wound with the kerlix dressing. She states they nrmally keep it open to air.   She states she ready to return back to [facility]

## 2023-02-17 NOTE — DISCHARGE INSTR - COC
Continuity of Care Form    Patient Name: Ani Murillo   :  1950  MRN:  [id number]    Admit date:  2/15/2023  Discharge date:  23    Code Status Order: DNR-CCA   Advance Directives:     Admitting Physician:  Bob Guido MD  PCP: Bob Guido MD    Discharging Nurse: [facility] Unit/Room#: 3643/5525-L  Discharging Unit Phone Number: [phone]    Emergency Contact:   Extended Emergency Contact Information  Primary Emergency Contact: Johnson Mcguire  Address: [address]  Phone: [phone]  Mobile Phone: [phone]  Relation: Child   needed? No  Secondary Emergency Contact: Rashel Hernandez [facility] [address]  Phone: [phone]  Mobile Phone: [phone]  Relation: Child   needed? No    Past Surgical History:  Past Surgical History:   Procedure Laterality Date    ABDOMEN SURGERY N/A 2020    SACRAL WOUND DEBRIDEMENT CALL   WITH TIME AVAIL AM performed by Padmini Ayala MD at [address]   x3    CHOLECYSTECTOMY  3/31/16    Laparoscopic-Dr. Mendez Madrigal       for kidney stones    DIALYSIS FISTULA CREATION Left 2021    INSERTION FISTULA LEFT ARM performed by Makayla Kwong MD at [address] Right 2021    REVISION AV FISTULA LEFT ARM performed by Makayla Kwong MD at [place] [address] Right 2022    RIGHT DISTAL FEMUR OPEN REDUCTION INTERNAL FIXATION ++SYNTHES++ performed by Mariam Plata MD at [place] [address]       right     UPPER GASTROINTESTINAL ENDOSCOPY  2.18.15    Dr. Shannan Galvez Findings: Mild Gastrits and Duodenitis, 2cm Hiatal Hernia    UPPER GASTROINTESTINAL ENDOSCOPY N/A 2020    EGD CONTROL HEMORRHAGE performed by Padmini Ayala MD at [address] [facility] N/A 2020    EGD BEDSIDE performed by Guillermo Brandt MD at [address] [facility] SURGERY N/A 5/6/2021    INSERTION TUNNELED DIALYSIS CATHETER performed by Adarsh Carpenter MD at [address] Left 11/4/2022    LEFT DISTAL RADIUS OPEN REDUCTION INTERNAL FIXATION    ++SYNTHES++ performed by Miguel Cook MD at [address]       Immunization History:   Immunization History   Administered Date(s) Administered    COVID-19, PFIZER PURPLE top, DILUTE for use, (age 15 y+), 30mcg/0.3mL 01/05/2021, 01/26/2021       Active Problems:  Patient Active Problem List   Diagnosis Code    Neurologic gait dysfunction R26.9    Diabetes mellitus ([facility] E11.9    Hypertension I10    Arthritis M19.90    Knee problem M25.9    Anemia due to stage 3 chronic kidney disease N18.30, D63.1    Primary osteoarthritis of both knees M17.0    Moderate protein-calorie malnutrition (HCC) E44.0    Pressure injury of sacral region, stage 4 (HCC) L89.154    Pressure injury of right hip, stage 3 (HCC) L89.213    Left bundle branch block I44.7    Moderate obesity E66.8    Failure to thrive in adult R62.7    Pressure injury of sacral region, stage 3 (HCC) L89.153    Decreased dorsalis pedis pulse R09.89    ESRD on hemodialysis (HCC) N18.6, Z99.2    Closed bicondylar fracture of distal femur, right, initial encounter ([facility] S72.421A, S72.431A    Other fracture of right femur, initial encounter for closed fracture ([facility] S72.8X1A    Closed fracture of distal ends of left radius and ulna S52.502A, S52.602A    Closed bicondylar fracture of distal end of right femur ([facility] S72.421A, W97.981J    Metabolic encephalopathy G02.88    Acute respiratory failure with hypoxia ([facility]) J96.01    Pneumonia due to infectious organism, unspecified laterality, unspecified part of lung J18.9    Pneumonia due to organism J18.9    AMS (altered mental status) R41.82       Isolation/Infection:   Isolation            No Isolation          Patient Infection Status       Infection Onset Added Last Indicated Last Indicated By Review Planned Expiration Resolved Resolved By    None active    Resolved    COVID-19 (Rule Out) 02/15/23 02/15/23 02/15/23 COVID-19 & Influenza Combo (Ordered)   02/15/23 Rule-Out Test Resulted    COVID-19 (Rule Out) 23 COVID-19 & Influenza Combo (Ordered)   23 Rule-Out Test Resulted    COVID-19 22 COVID-19, Rapid   22     COVID-19 (Rule Out) 22 COVID-19, Rapid (Ordered)   22 Rule-Out Test Resulted    COVID-19 (Rule Out) 22 COVID-19, Rapid (Ordered)   22 Rule-Out Test Resulted    COVID-19 (Rule Out) 21 COVID-19 (Ordered)   21 Rule-Out Test Resulted    COVID-19 (Rule Out) 10/09/20 10/09/20 10/09/20 COVID-19 (Ordered)   10/09/20 Rule-Out Test Resulted    COVID-19 (Rule Out) 20 COVID-19 (Ordered)   20     VRE 20 Culture, Wound   10/12/20 Jacob Brito RN    Enterococcus faecium Wound-Coccyx 20    MDRO (multi-drug resistant organism) 20 Culture, Wound   10/12/20 Jacob Brito RN    COVID-19 (Rule Out) 20 COVID-19 (Ordered)   20 Rule-Out Test Resulted    DETECTED 2020    COVID-19 (Rule Out) 05/14/20 05/15/20 05/14/20 Covid-19 Ambulatory (Ordered)   20 Rule-Out Test Resulted    Detected 2020    COVID-19 (Rule Out) 20 COVID-19 (Ordered)   20 Rule-Out Test Resulted    DETECTED 2020    C-diff Rule Out 20 CLOSTRIDIUM DIFFICILE EIA (Ordered)   20 Joy Allen RN    Does not meet criteria    MRSA 20 Culture, Wound   20 Saeid Hodge RN    Wound buttock 5/3/20    COVID-19 20 COVID-19   20     Detected 2020, 2020, 2020, 5/3/2020    COVID-19 (Rule Out) 20 COVID-19 (Ordered)   20 Rule-Out Test Resulted    DETECTED 5/3/2020            Nurse Assessment:  Last Vital Signs: BP (!) 115/58   Pulse 61   Temp (!) 95.9 °F (35.5 °C) (Temporal)   Resp 18   Ht 5' 1\" (1.549 m)   Wt 191 lb 12.8 oz (87 kg)   SpO2 99%   BMI 36.24 kg/m²     Last documented pain score (0-10 scale): Pain Level: 7  Last Weight:   Wt Readings from Last 1 Encounters:   02/17/23 191 lb 12.8 oz (87 kg)     Mental Status:  disoriented and alert    IV Access:  - None    Nursing Mobility/ADLs:  Walking   Dependent  Transfer  Dependent  Bathing  Dependent  Dressing  Dependent  Toileting  Dependent  Feeding  Dependent  Med Admin  Dependent  Med Delivery   whole    Wound Care Documentation and Therapy:  Wound 12/06/21 Sacrum in fold (Active)   Number of days: 438       Wound 07/09/22 Coccyx (Active)   Wound Image   02/16/23 0850   Wound Etiology Pressure Stage 4 02/17/23 0949   Dressing Status Clean;Dry; Intact 02/17/23 0949   Wound Cleansed Cleansed with saline 02/17/23 0949   Dressing/Treatment Moisten with saline 02/17/23 0949   Wound Length (cm) 6 cm 02/16/23 0850   Wound Width (cm) 4.2 cm 02/16/23 0850   Wound Depth (cm) 3 cm 02/16/23 0850   Wound Surface Area (cm^2) 25.2 cm^2 02/16/23 0850   Change in Wound Size % (l*w) -188 02/16/23 0850   Wound Volume (cm^3) 75.6 cm^3 02/16/23 0850   Wound Healing % -476 02/16/23 0850   Wound Assessment Pink/red;Slough 02/16/23 0850   Drainage Amount Small 02/17/23 0949   Drainage Description Serosanguinous 02/17/23 0949   Odor None 02/17/23 0949   Carmen-wound Assessment Maceration 02/17/23 0949   Number of days: 223       Wound 10/25/22 Coccyx #1 (Active)   Number of days: 115       Wound 11/25/22 Coccyx Posterior (Active)   Number of days: 83       Wound 02/09/23 Knee Right;Lateral (Active)   Wound Image   02/16/23 0850   Wound Etiology Pressure Stage 3 02/16/23 0058   Dressing Status Clean;Dry; Intact 02/17/23 0949   Wound Cleansed Cleansed with saline 02/17/23 0949   Dressing/Treatment ABD;Alginate;Dry dressing 02/17/23 0949   Wound Length (cm) 3 cm 02/16/23 0850   Wound Width (cm) 1.8 cm 02/16/23 0850   Wound Depth (cm) 0.8 cm 02/16/23 0850   Wound Surface Area (cm^2) 5.4 cm^2 02/16/23 0850   Change in Wound Size % (l*w) -92.86 02/16/23 0850   Wound Volume (cm^3) 4.32 cm^3 02/16/23 0850   Wound Healing % -286 02/16/23 0850   Wound Assessment Pink/red 02/17/23 0949   Drainage Amount None 02/17/23 0949   Drainage Description Serosanguinous 02/16/23 0850   Odor None 02/17/23 0949   Carmen-wound Assessment Dry/flaky 02/17/23 0949   Number of days: 7       Wound 02/09/23 Leg Right; Lower (Active)   Wound Image   02/16/23 0850   Wound Etiology Pressure Stage 3 02/16/23 0058   Dressing Status Clean;Dry; Intact 02/17/23 0949   Wound Cleansed Cleansed with saline 02/17/23 0949   Dressing/Treatment ABD; Alginate;Dry dressing;Roll gauze 02/17/23 0949   Wound Length (cm) 1.8 cm 02/16/23 0850   Wound Width (cm) 1 cm 02/16/23 0850   Wound Depth (cm) 0.1 cm 02/16/23 0850   Wound Surface Area (cm^2) 1.8 cm^2 02/16/23 0850   Change in Wound Size % (l*w) 65.52 02/16/23 0850   Wound Volume (cm^3) 0.18 cm^3 02/16/23 0850   Wound Healing % 96 02/16/23 0850   Undermining Starts ___ O'Clock 5 02/10/23 1045   Undermining Ends___ O'Clock 6 02/10/23 1045   Undermining Maxium Distance (cm) 1.5 02/10/23 1045   Wound Assessment Pink/red 02/16/23 0850   Drainage Amount Scant 02/17/23 0949   Drainage Description Serosanguinous 02/17/23 0949   Odor None 02/17/23 0949   Carmen-wound Assessment Dry/flaky 02/17/23 0949   Number of days: 7       Incision 11/04/22 Radial Left (Active)   Number of days: 104       Incision 11/18/21 Brachial Anterior; Distal; Left (Active)   Number of days: 456        Elimination:  Continence:    Bowel: No  Bladder: No  Urinary Catheter: None   Colostomy/Ileostomy/Ileal Conduit: No       Date of Last BM: 2/17/23    Intake/Output Summary (Last 24 hours) at 2/17/2023 1332  Last data filed at 2/16/2023 1544  Gross per 24 hour   Intake 300 ml   Output 2400 ml   Net -2100 ml     I/O last 3 completed shifts: In: 300   Out: 2400     Safety Concerns: At Risk for Falls    Impairments/Disabilities:      None    Nutrition Therapy:  Current Nutrition Therapy:   Dysphagia 2      Routes of Feeding: Oral  Liquids: No Restrictions  Daily Fluid Restriction: no  Last Modified Barium Swallow with Video (Video Swallowing Test): not done    Treatments at the Time of Hospital Discharge:   Respiratory Treatments: N/A  Oxygen Therapy:  is not on home oxygen therapy. Ventilator:    - No ventilator support    Rehab Therapies: Physical Therapy and Occupational Therapy  Weight Bearing Status/Restrictions: No weight bearing restrictions  Other Medical Equipment (for information only, NOT a DME order):  wheelchair, walker, bath bench, bedside commode, and hospital bed  Other Treatments: COCCYX DRESSING: cleanse with NSS pack with moist kerlex apply 4x4 and cover with ABD. Daily and prn  R lateral knee and r lower leg: cleanse with NSS apply opticel apply DSD and wrap with kerlex    Patient's personal belongings (please select all that are sent with patient):  {[facility] DME Belongings:[id number]}    RN SIGNATURE:  Electronically signed by Alex Jim RN on 2/18/23 at 10:11 AM EST    CASE MANAGEMENT/SOCIAL WORK SECTION    Inpatient Status Date: ***    Readmission Risk Assessment Score:  Readmission Risk              Risk of Unplanned Readmission:  31           Discharging to Facility/ Agency   Name:   [facility]   Address:   [place] [address]  Phone:  Fax:    Dialysis Facility (if applicable)   Name:  Address:  Dialysis Schedule:  Phone:  Fax:    / signature: Electronically signed by Chio Vo RN on 2/17/23 at 1:33 PM EST    PHYSICIAN SECTION    Prognosis: Fair    Condition at Discharge: Stable    Rehab Potential (if transferring to Rehab):  Fair    Recommended Labs or Other Treatments After Discharge:     Physician Certification: I certify the above information and transfer of Hunter Lora  is necessary for the continuing treatment of the diagnosis listed and that she requires [facility] for less than 30 days.      Update Admission H&P: No change in H&P    PHYSICIAN SIGNATURE:  Electronically signed by Philip Justice MD on 2/18/23 at 5:36 AM EST

## 2023-02-17 NOTE — CARE COORDINATION
Chart reviewed and case reviewed in IDR. Therapy notes received yesterday for return to [facility] for rehab when medically stable. New consults to ID and pulmonology received as well, await their input. Nephrology following, chronic HD patient and patient will continue to dialyze at [facility] HD den on discharge  Wound care is following for wounds to coccyx and legs, orders received for wound care. Patient is a bed hold and can return to [facility] when medically stable to do so. No COVID or authorization is need for transition of care. Family is in agreement with return to [facility] when medically stable. Envelope and transfer paperwork is in the soft chart, will need to be signed and dated on the day of discharge. CIELO completed. Will continue to follow.       Seth Carpenter RN.  Fortunato Madrigal  [phone]

## 2023-02-17 NOTE — CONSULTS
Prema Alves M.D.,[facility]  Lauren Christina D.O., F.TOM.AISHWARYA.ROBB., Tamika Reyes M.D. Ellie Olivares M.D. Dilip Norton D.O. Patient:  Cam Herr 67 y.o. female MRN: [id number]     Date of Service: 2/17/2023      PULMONARY CONSULTATION    Reason for Consultation: Worsening pleural effusion and infiltrates  Referring Physician: Dr. Latoya Buckner with the referring physician will be sent via the electronic medical record. Chief Complaint: Shortness of breath    CODE STATUS: DNR CCA    SUBJECTIVE:  HPI:  Cam Herr is a 67 y.o. female not previously known to our practice who presented to the ED on 2/15 with complaints of fatigue and lethargy at her nursing facility. She is currently a dialysis patient. She was confused on arrival.  She was recently admitted to the hospital for shortness of breath and was discharged only a couple days prior. She had no fever or white count on presentation. Today, she is seen and examined lying in bed in no apparent distress. She is currently on 3 L of oxygen and denies cough, congestion, fever, chills, sweats. She says she is not sure why she is in the hospital.  We will continue to treat for pneumonia and will consult IR for thoracentesis.       Past Medical History:   Diagnosis Date    Anemia in chronic kidney disease     Anxiety and depression     Arthritis     Chronic pain syndrome     COVID-19 05/2020    COVID-19     Decreased dorsalis pedis pulse 10/25/2022    Diabetes mellitus ([address]     Dysphagia, oral phase     ESRD on hemodialysis ([address] 10/25/2022    GERD (gastroesophageal reflux disease)     Hemodialysis patient ([address]     M-W-F    Hyperkalemia     Hypertension     Hypertensive kidney disease with stage 4 chronic kidney disease (HCC)     Insomnia     Kidney stones     MDD (major depressive disorder)     Moderate protein-calorie malnutrition (HCC)     Morbid obesity (HCC)     Muscle weakness (generalized)     Obesity     Pressure injury of sacral region, stage 3 ([address] 10/25/2022    Pressure ulcer of sacral region     Sacral pressure ulcer 08/03/2021    stage 4    Unspecified osteoarthritis, unspecified site     Weakness generalized        Past Surgical History:   Procedure Laterality Date    ABDOMEN SURGERY N/A 5/4/2020    SACRAL WOUND DEBRIDEMENT CALL DRWatson  WITH TIME AVAIL AM performed by Ramses Nunez MD at [address]   x3    CHOLECYSTECTOMY  3/31/16    Laparoscopic-Dr. Crystal Fisher  2011     for kidney stones    DIALYSIS FISTULA CREATION Left 8/5/2021    INSERTION FISTULA LEFT ARM performed by Anna Soulier, MD at [address] Right 11/18/2021    REVISION AV FISTULA LEFT ARM performed by Anna Soulier, MD at [place] [address] Right 11/4/2022    RIGHT DISTAL FEMUR OPEN REDUCTION INTERNAL FIXATION ++SYNTHES++ performed by Chanda Pacheco MD at [place] [address]  2009     right     UPPER GASTROINTESTINAL ENDOSCOPY  2.18.15    Dr. Nancy Manzano Findings: Mild Gastrits and Duodenitis, 2cm Hiatal Hernia    UPPER GASTROINTESTINAL ENDOSCOPY N/A 5/8/2020    EGD CONTROL HEMORRHAGE performed by Ramses Nunez MD at [address] [facility] N/A 5/22/2020    EGD BEDSIDE performed by Jethro Miranda MD at [address] [place] [address] N/A 5/6/2021    INSERTION TUNNELED DIALYSIS CATHETER performed by Anna Soulier, MD at [facility] Left 11/4/2022    LEFT DISTAL RADIUS OPEN REDUCTION INTERNAL FIXATION    ++SYNTHES++ performed by Chanda Pacheco MD at [facility] OR       Family History   Problem Relation Age of Onset    Cancer Sister        Social History:   Social History     Socioeconomic History    Marital status:      Spouse name: Not on file    Number of children: Not on file    Years of education: Not on file    Highest education level: Not on file   Occupational History    Not on file   Tobacco Use    Smoking status: Former     Packs/day: 1.00     Years: 30.00     Pack years: 30.00     Types: Cigarettes     Quit date: 2011     Years since quittin.5    Smokeless tobacco: Never   Vaping Use    Vaping Use: Never used   Substance and Sexual Activity    Alcohol use: No    Drug use: No    Sexual activity: Not Currently   Other Topics Concern    Not on file   Social History Narrative    Not on file     Social Determinants of Health     Financial Resource Strain: Not on file   Food Insecurity: Not on file   Transportation Needs: Not on file   Physical Activity: Not on file   Stress: Not on file   Social Connections: Not on file   Intimate Partner Violence: Not on file   Housing Stability: Not on file     Smoking history: The patient is a Past smoker 1 pack year for 30 years. Pack year equal 30. ETOH:   reports no history of alcohol use. Exposures: There  is not history of TB or TB exposure. There is not asbestos or silica dust exposure. The patient reports does not have coal, foundry, quarry or [employer] exposure. Recent travel history none. There is not  history of recreational or IV drug use. There is not hot tub exposure. The patient does not have any exotic pets, turtles or exotic birds.        Vaccines:    Influenza: Unknown  Pneumococcal Polysaccharide: Unknown  Immunization History   Administered Date(s) Administered    COVID-19, PFIZER PURPLE top, DILUTE for use, (age 15 y+), 30mcg/0.3mL 2021, 2021        Home Meds: Medications Prior to Admission: glucose 4 g chewable tablet, Take 4 tablets by mouth as needed for Low blood sugar  ferric citrate (AURYXIA) 1  MG(Fe) TABS tablet, Take 420 mg by mouth 3 times daily (with meals)  benzonatate (TESSALON) 100 MG capsule, Take 100 mg by mouth 3 times daily as needed for Cough  bisacodyl (DULCOLAX) 10 MG suppository, Place 10 mg rectally daily as needed for Constipation  sodium phosphate (FLEET) 7-19 GM/118ML, Place 1 enema rectally once as needed (constipation)  glucose (GLUTOSE) 40 % GEL, Take 15 g by mouth as needed (hypoglycemia)  guaiFENesin 200 MG/5ML LIQD, Take 200 mg by mouth every 6 hours as needed (cough)  insulin lispro, 1 Unit Dial, (HUMALOG/ADMELOG) 100 UNIT/ML SOPN, Inject 0-10 Units into the skin 3 times daily (before meals) *Per Sliding Scale*  lactulose (CHRONULAC) 10 GM/15ML solution, Take 10 g by mouth daily as needed (constipation)  busPIRone (BUSPAR) 10 MG tablet, Take 10 mg by mouth daily  polyethylene glycol (GLYCOLAX) 17 g packet, Take 17 g by mouth daily as needed for Constipation  oxyCODONE-acetaminophen (PERCOCET) 5-325 MG per tablet, Take 1 tablet by mouth every 6 hours as needed for Pain.  epoetin derek-epbx (RETACRIT) 00765 UNIT/ML SOLN injection, Inject 10,000 Units into the skin See Admin Instructions Given Tuesday,Thursday,Saturday  senna (SENOKOT) 8.6 MG tablet, Take 2 tablets by mouth 2 times daily  traZODone (DESYREL) 50 MG tablet, Take 25 mg by mouth nightly  acetaminophen (TYLENOL) 325 MG tablet, Take 650 mg by mouth every 4 hours as needed for Pain or Fever  vitamin D (CHOLECALCIFEROL) 25 MCG (1000 UT) TABS tablet, Take 1,000 Units by mouth daily  ondansetron (ZOFRAN) 4 MG tablet, Take 4 mg by mouth every 12 hours as needed for Nausea or Vomiting  insulin glargine (LANTUS) 100 UNIT/ML injection vial, Inject 6 Units into the skin nightly  midodrine (PROAMATINE) 5 MG tablet, Take 15 mg by mouth See Admin Instructions Given Monday,Wednesday,Friday  metoprolol succinate (TOPROL XL) 25 MG extended release tablet, Take 1 tablet by mouth daily  escitalopram (LEXAPRO) 10 MG tablet, Take 10 mg by mouth daily  gabapentin (NEURONTIN) 300 MG capsule, Take 300 mg by mouth daily.     CURRENT MEDS :  Scheduled Meds:   busPIRone  10 mg Oral Daily    escitalopram  10 mg Oral Daily    gabapentin  300 mg Oral Daily    insulin glargine-yfgn  6 Units SubCUTAneous Nightly    metoprolol succinate  25 mg Oral Daily    senna  2 tablet Oral BID traZODone  25 mg Oral Nightly    sodium chloride flush  5-40 mL IntraVENous 2 times per day    insulin lispro  0-8 Units SubCUTAneous TID WC    insulin lispro  0-4 Units SubCUTAneous Nightly    miconazole   Topical BID       Continuous Infusions:   sodium chloride      dextrose         No Known Allergies    REVIEW OF SYSTEMS:  Constitutional: Denies fever, weight loss, night sweats, and fatigue  Skin: Denies pigmentation, dark lesions, and rashes   HEENT: Denies hearing loss, tinnitus, ear drainage, epistaxis, sore throat, and hoarseness. Cardiovascular: Denies palpitations, chest pain, and chest pressure. Respiratory: Denies cough, dyspnea at rest, hemoptysis, apnea, and choking. Gastrointestinal: Denies nausea, vomiting, poor appetite, diarrhea, heartburn or reflux  Genitourinary: Denies dysuria, frequency, urgency or hematuria  Musculoskeletal: Denies myalgias, muscle weakness, and bone pain  Neurological: Denies dizziness, vertigo, headache, and focal weakness  Psychological: Denies anxiety and depression  Endocrine: Denies heat intolerance and cold intolerance  Hematopoietic/Lymphatic: Denies bleeding problems and blood transfusions    OBJECTIVE:   BP (!) 115/58   Pulse 61   Temp (!) 95.9 °F (35.5 °C) (Temporal)   Resp 18   Ht 5' 1\" (1.549 m)   Wt 191 lb 12.8 oz (87 kg)   SpO2 99%   BMI 36.24 kg/m²   SpO2 Readings from Last 1 Encounters:   02/17/23 99%        I/O:    Intake/Output Summary (Last 24 hours) at 2/17/2023 1034  Last data filed at 2/16/2023 1544  Gross per 24 hour   Intake 300 ml   Output 2400 ml   Net -2100 ml                      Physical Exam:  General: The patient is lying in bed comfortably without any distress.   Breathing is not labored  HEENT: Pupils are equal round and reactive to light, there are no oral lesions and no post-nasal drip   Neck: supple without adenopathy  Cardiovascular: regular rate and rhythm without murmur or gallop  Respiratory: Clear to auscultation bilaterally without wheezing or crackles. Air entry is symmetric  Abdomen: soft, non-tender, non-distended, normal bowel sounds  Extremities: warm, no edema, no clubbing  Skin: no rash or lesion  Neurologic: CN II-XII grossly intact, no focal deficits    Pulmonary Function Testing personally reviewed and interpreted  None on file    Imaging personally reviewed:  2/15/23 cxr  Somewhat limited exam.  Enlarged cardiac silhouette and findings related to a   history of granulomatous disease. Pulmonary infiltrates on the right,   increased in the upper lung since 02/09/2023. Possible left basilar pleural   and/or parenchymal disease. 2/16/2023 CT chest  1. Moderate bilateral pleural effusions which have increased. 2. New bilateral lung infiltrates, particularly right lung. Echo:  2/8/2022  Technically limited study with off axis images. Left ventricular internal dimensions were normal in diastole and systole. Abnormal (paradoxical) motion consistent with conduction abnormality. No regional wall motion abnormalities seen. The left atrium is moderately dilated. Focal calcification mitral valve leaflets and subvalvular structures. Severe mitral annular calcification. Mild mitral regurgitation is present. Moderate functional mitral stenosis .    The aortic valve appears mildly sclerotic    Labs:  Lab Results   Component Value Date/Time    WBC 4.9 02/17/2023 06:22 AM    HGB 9.5 02/17/2023 06:22 AM    HCT 30.4 02/17/2023 06:22 AM    .3 02/17/2023 06:22 AM    MCH 35.7 02/17/2023 06:22 AM    MCHC 31.3 02/17/2023 06:22 AM    RDW 15.4 02/17/2023 06:22 AM     02/17/2023 06:22 AM    MPV 10.8 02/17/2023 06:22 AM     Lab Results   Component Value Date/Time     02/17/2023 06:22 AM    K 3.8 02/17/2023 06:22 AM    K 3.8 11/30/2022 07:25 AM     02/17/2023 06:22 AM    CO2 32 02/17/2023 06:22 AM    BUN 16 02/17/2023 06:22 AM    CREATININE 3.9 02/17/2023 06:22 AM    LABALBU 3.2 02/15/2023 11:55 AM    LABALBU 4.1 10/12/2011 09:59 AM    CALCIUM 8.9 02/17/2023 06:22 AM    GFRAA 7 10/12/2022 03:49 AM    LABGLOM 12 02/17/2023 06:22 AM     Lab Results   Component Value Date/Time    PROTIME 10.6 11/23/2022 04:26 AM    PROTIME 15.3 06/25/2011 07:00 PM    INR 1.0 11/23/2022 04:26 AM     No results for input(s): PROBNP in the last 72 hours. No results for input(s): TROPONINI in the last 72 hours. Recent Labs     02/15/23  2003   PROCAL 1.66*     This SmartLink has not been configured with any valid records. Micro:  No results for input(s): CULTRESP in the last 72 hours. No results for input(s): LABGRAM in the last 72 hours. No results for input(s): LEGUR in the last 72 hours. No results for input(s): STREPNEUMAGU in the last 72 hours. No results for input(s): LP1UAG in the last 72 hours. Assessment:  Acute respiratory failure with hypoxia  Bilateral pleural effusion  Pneumonia, likely aspiration    Plan:  Continue oxygen therapy as needed, wean as tolerated, keep sats greater than 90%  Procalcitonin 1.66, WBC WNL  Augmentin x7 days ordered per ID  Consult IR for thoracentesis, bilateral as warranted    Thank you for allowing me to participate in the care of [facility]. Please feel free to call with questions. This plan of care was reviewed in collaboration with Dr. Abiel Lund    Electronically signed by Mel Gilford, APRN - CNP on 2/17/2023 at 10:34 AM      Note: This report was completed utilizing computer voice recognition software. Every effort has been made to ensure accuracy, however; inadvertent computerized transcription errors may be present      I personally saw, examined, and cared for the patient. Labs, medications, radiographs reviewed. I agree with history exam and plans detailed in NP note. Poor historian. Imaging reviewed. Suspect aspiration pneumonia b/l pleural effusions. Wean FiO2 as christie. Check video swallow. Abx per ID. Thoracentesis.     Yadi Sánchez DO

## 2023-02-17 NOTE — PROCEDURES
[phone] patient arrived from 0A, patient is alert and able to answer questions. Reviewed allergies, and medications, and past history. Patient is not wanting to perform the procedure, she stated that she is not having any difficulty breathing. Refusing procedure. Dr. Hilario Boone at the bedside speaking to patient in regards to the procedure, benefits to the procedure. 96 % on room air, 16 respirations,226/95 blood pressure, 63 heart rate, 0/10 pain. Reassessed blood pressure it is 18 respirations,197/115, heart rate 63, 96% room air. Called family to see if Cha Gordon (son) would encourage her to proceed. . Patient continued to refuse. Dr. Hilario Boone notified and aware. Transferred patient up to the floor, and spoke with the bedside nurse, Arben Arriaza. Educated Constantin Hampton RN in regards to the patients elevated blood pressure, and the refusal to proceed with the thoracentesis.

## 2023-02-17 NOTE — PROGRESS NOTES
Subjective: The patient is awake and alert. No problems overnight. Denies chest pain, angina, and dyspnea. Denies abdominal pain. Tolerating diet. No nausea or vomiting. Objective:    BP (!) 115/58   Pulse 61   Temp (!) 95.9 °F (35.5 °C) (Temporal)   Resp 18   Ht 5' 1\" (1.549 m)   Wt 191 lb 12.8 oz (87 kg)   SpO2 99%   BMI 36.24 kg/m²     Heart:  RRR, no murmurs, gallops, or rubs.   Lungs:  CTA bilaterally, no wheeze, rales or rhonchi  Abd: bowel sounds present, nontender, nondistended, no masses  Extrem:  No clubbing, cyanosis, or edema    CBC with Differential:    Lab Results   Component Value Date/Time    WBC 4.9 02/17/2023 06:22 AM    RBC 2.66 02/17/2023 06:22 AM    HGB 9.5 02/17/2023 06:22 AM    HCT 30.4 02/17/2023 06:22 AM     02/17/2023 06:22 AM    .3 02/17/2023 06:22 AM    MCH 35.7 02/17/2023 06:22 AM    MCHC 31.3 02/17/2023 06:22 AM    RDW 15.4 02/17/2023 06:22 AM    NRBC 0.0 11/26/2022 07:02 AM    SEGSPCT 71 08/16/2013 05:00 AM    BANDSPCT 1 03/29/2016 07:37 PM    METASPCT 0.9 05/10/2020 03:45 AM    LYMPHOPCT 15.8 02/15/2023 11:55 AM    PROMYELOPCT 0.9 11/07/2022 05:59 AM    MONOPCT 13.2 02/15/2023 11:55 AM    MYELOPCT 0.9 11/26/2022 07:02 AM    BASOPCT 1.2 02/15/2023 11:55 AM    MONOSABS 1.10 02/15/2023 11:55 AM    LYMPHSABS 1.32 02/15/2023 11:55 AM    EOSABS 0.17 02/15/2023 11:55 AM    BASOSABS 0.10 02/15/2023 11:55 AM     CMP:    Lab Results   Component Value Date/Time     02/17/2023 06:22 AM    K 3.8 02/17/2023 06:22 AM    K 3.8 11/30/2022 07:25 AM     02/17/2023 06:22 AM    CO2 32 02/17/2023 06:22 AM    BUN 16 02/17/2023 06:22 AM    CREATININE 3.9 02/17/2023 06:22 AM    GFRAA 7 10/12/2022 03:49 AM    LABGLOM 12 02/17/2023 06:22 AM    GLUCOSE 85 02/17/2023 06:22 AM    GLUCOSE 149 10/12/2011 09:59 AM    PROT 6.9 02/15/2023 11:55 AM    LABALBU 3.2 02/15/2023 11:55 AM    LABALBU 4.1 10/12/2011 09:59 AM    CALCIUM 8.9 02/17/2023 06:22 AM    BILITOT 0.4 02/15/2023 11:55 AM    ALKPHOS 76 02/15/2023 11:55 AM    AST 15 02/15/2023 11:55 AM    ALT 7 02/15/2023 11:55 AM    Pro calcitonin 1.66    Assessment:    Patient Active Problem List   Diagnosis    Neurologic gait dysfunction    Diabetes mellitus ([address]    Hypertension    Arthritis    Knee problem    Anemia due to stage 3 chronic kidney disease    Primary osteoarthritis of both knees    Moderate protein-calorie malnutrition (HCC)    Pressure injury of sacral region, stage 4 (HCC)    Pressure injury of right hip, stage 3 (HCC)    Left bundle branch block    Moderate obesity    Failure to thrive in adult    Pressure injury of sacral region, stage 3 (HCC)    Decreased dorsalis pedis pulse    ESRD on hemodialysis ([address]    Closed bicondylar fracture of distal femur, right, initial encounter ([address]    Other fracture of right femur, initial encounter for closed fracture ([address]    Closed fracture of distal ends of left radius and ulna    Closed bicondylar fracture of distal end of right femur ([address]    Metabolic encephalopathy    Acute respiratory failure with hypoxia ([address]    Pneumonia due to infectious organism, unspecified laterality, unspecified part of lung    Pneumonia due to organism    AMS (altered mental status)       Plan:  Antibiotics per ID  IR consultation for possible thoracentesis        Sara Alston MD  4:12 PM  2/17/2023

## 2023-02-17 NOTE — CONSULTS
[facility] CONSULT NOTE    Reason for Consult: Pneumonia and possible sacral osteomyelitis  Requested by: Marcia Jade MD     Chief complaint: Lethargy    History Obtained From: EMR and patient    HISTORY OFPRESENT ILLNESS              The patient is a 67 y.o. female with history of hypertension, DM, ESRD on hemodialysis, previously admitted from 02/09-02/11 for shortness of breath with an episode of emesis, thought to be secondary to aspiration pneumonia for which she received a dose of ampicillin-sulbactam then discharged off antibiotics, presented on 02/15 with lethargy. On admission, she was afebrile and hemodynamically stable with no leukocytosis. Procalcitonin level was elevated at 1.66 ng/mL. Inflammatory markers were elevated with ESR of 63 mm/hr and CRP of 3.1 mg/dL. CT chest showed moderate bilateral pleural effusions and new bilateral lung infiltrates especially on the right. CT abdomen and pelvis showed inferior right upper lobe, lingular and bilateral lower lobe infiltrates, bilateral pleural effusions, 16 mm indeterminate left renal lesion, cardiomegaly with atherosclerosis, diffuse urinary bladder wall thickening, diverticulosis without diverticulitis, stool distended rectum suggesting fecal impaction, decubitus ulcer with erosive change inferior sacrum/coccyx suspicious for osteomyelitis however appearance is similar to that from 04/12/2022. She received a dose of piperacillin-tazobactam and vancomycin on admission. ID service was subsequently consulted for further recommendations.     Past Medical History  Past Medical History:   Diagnosis Date    Anemia in chronic kidney disease     Anxiety and depression     Arthritis     Chronic pain syndrome     COVID-19 05/2020    COVID-19     Decreased dorsalis pedis pulse 10/25/2022    Diabetes mellitus ([facility] [address]     Dysphagia, oral phase     ESRD on hemodialysis ([facility] [address] 10/25/2022    GERD (gastroesophageal reflux disease)     Hemodialysis patient ([facility] [address]     M-W-F Hyperkalemia     Hypertension     Hypertensive kidney disease with stage 4 chronic kidney disease (HCC)     Insomnia     Kidney stones     MDD (major depressive disorder)     Moderate protein-calorie malnutrition (HCC)     Morbid obesity (HCC)     Muscle weakness (generalized)     Obesity     Pressure injury of sacral region, stage 3 ([address] 10/25/2022    Pressure ulcer of sacral region     Sacral pressure ulcer 08/03/2021    stage 4    Unspecified osteoarthritis, unspecified site     Weakness generalized        Current Facility-Administered Medications   Medication Dose Route Frequency Provider Last Rate Last Admin    bisacodyl (DULCOLAX) suppository 10 mg  10 mg Rectal Daily PRN Alexei Khanna MD        busPIRone (BUSPAR) tablet 10 mg  10 mg Oral Daily Alexei Khanna MD   10 mg at 02/17/23 1020    escitalopram (LEXAPRO) tablet 10 mg  10 mg Oral Daily Alexei Khanna MD   10 mg at 02/17/23 1021    gabapentin (NEURONTIN) capsule 300 mg  300 mg Oral Daily Alexei Khanna MD   300 mg at 02/17/23 1020    guaiFENesin (ROBITUSSIN) 100 MG/5ML liquid 200 mg  200 mg Oral Q6H PRN Alexei Khanna MD        insulin glargine-yfgn [facility]) injection vial 6 Units  6 Units SubCUTAneous Nightly Alexei Khanna MD   6 Units at 02/16/23 2107    lactulose (CHRONULAC) 10 GM/15ML solution 10 g  10 g Oral Daily PRN Alexei Khanna MD   10 g at 02/16/23 0117    metoprolol succinate (TOPROL XL) extended release tablet 25 mg  25 mg Oral Daily Alexei Khanna MD   25 mg at 02/17/23 1020    oxyCODONE-acetaminophen (PERCOCET) 5-325 MG per tablet 1 tablet  1 tablet Oral Q6H PRN Alexei Khanna MD   1 tablet at 02/17/23 1020    senna (SENOKOT) tablet 17.2 mg  2 tablet Oral BID Alexei Khanna MD   17.2 mg at 02/17/23 1020    traZODone (DESYREL) tablet 25 mg  25 mg Oral Nightly Alexei Khanna MD   25 mg at 02/16/23 2057    sodium chloride flush 0.9 % injection 5-40 mL  5-40 mL IntraVENous 2 times per day Alexei Khanna MD   10 mL at 02/17/23 1028    sodium chloride flush 0.9 % injection 5-40 mL  5-40 mL IntraVENous PRN Se Gordon MD        0.9 % sodium chloride infusion   IntraVENous PRN Se Gordon MD        acetaminophen (TYLENOL) tablet 650 mg  650 mg Oral Q6H PRN Se Gordon MD        Or    acetaminophen (TYLENOL) suppository 650 mg  650 mg Rectal Q6H PRN Se Gordon MD        insulin lispro (HUMALOG) injection vial 0-8 Units  0-8 Units SubCUTAneous TID WC Se Gordon MD        insulin lispro (HUMALOG) injection vial 0-4 Units  0-4 Units SubCUTAneous Nightly Se Gordon MD        glucose chewable tablet 16 g  4 tablet Oral PRN Se Gordon MD        dextrose bolus 10% 125 mL  125 mL IntraVENous PRN Se Gordon MD        Or    dextrose bolus 10% 250 mL  250 mL IntraVENous PRN Se Gordon MD        glucagon injection 1 mg  1 mg SubCUTAneous PRN Se Gordon MD   1 mg at 02/16/23 0117    dextrose 10 % infusion   IntraVENous Continuous PRN Se Gordon MD        polyethylene glycol (GLYCOLAX) packet 17 g  17 g Oral Daily PRN Se Gordon MD        miconazole (MICOTIN) 2 % powder   Topical BID eS Gordon MD   Given at 02/17/23 1025       No Known Allergies    Surgical History  Past Surgical History:   Procedure Laterality Date    ABDOMEN SURGERY N/A 5/4/2020    SACRAL WOUND DEBRIDEMENT KATHERINE MALIK  WITH TIME AVAIL AM performed by Christal Wade MD at [address]   x3    CHOLECYSTECTOMY  3/31/16    Laparoscopic-Dr. Joseph Mejia  2011     for kidney stones    DIALYSIS FISTULA CREATION Left 8/5/2021    INSERTION FISTULA LEFT ARM performed by William Blanco MD at [facility] [address] Right 11/18/2021    REVISION AV FISTULA LEFT ARM performed by William Blanco MD at [facility] Right 11/4/2022    RIGHT DISTAL FEMUR OPEN REDUCTION INTERNAL FIXATION ++SYNTHES++ performed by Sushila Giraldo MD at [place] [address]  2009     right     UPPER GASTROINTESTINAL ENDOSCOPY  2.18.15    Dr. Vasu Yen Findings: Mild Gastrits and Duodenitis, 2cm Hiatal Hernia    UPPER GASTROINTESTINAL ENDOSCOPY N/A 2020    EGD CONTROL HEMORRHAGE performed by Oscar Prince MD at [facility] N/A 2020    EGD BEDSIDE performed by Jeff Osborne MD at [address] [place] [address] N/A 2021    INSERTION TUNNELED DIALYSIS CATHETER performed by Lawyer Addie MD at [address] [facility] 2022    LEFT DISTAL RADIUS OPEN REDUCTION INTERNAL FIXATION    ++SYNTHES++ performed by Mary Obrien MD at [place] [address] History     Socioeconomic History    Marital status:    Tobacco Use    Smoking status: Former     Packs/day: 1.00     Years: 30.00     Pack years: 30.00     Types: Cigarettes     Quit date: 2011     Years since quittin.5    Smokeless tobacco: Never   Vaping Use    Vaping Use: Never used   Substance and Sexual Activity    Alcohol use: No    Drug use: No       Family Medical History  Family History   Problem Relation Age of Onset    Cancer Sister        Review of Systems:  Constitutional: No fever, no chills  Eyes: No vision changes, no retroorbital pain  ENT: No hearing changes, no ear pain  Respiratory: No cough, no dyspnea  Cardiovascular: No chest pain, no palpitations  Gastrointestinal: No abdominal pain, no diarrhea  Genitourinary: No dysuria, no hematuria  Integumentary: No rash, no itching  Musculoskeletal: No muscle pain, no joint pain  Neurologic: No headache, no numbness in extremities    Physical Examination:  Vitals:    23 2030 23 2127 23 0417 23 0818   BP: (!) 105/51   (!) 115/58   Pulse: 70   61   Resp: 18 18  18   Temp: 97.5 °F (36.4 °C)   (!) 95.9 °F (35.5 °C)   TempSrc: Oral   Temporal   SpO2: 97%   99%   Weight:   191 lb 12.8 oz (87 kg)    Height:         Constitutional: Alert, not in distress  Eyes: Sclerae anicteric, no conjunctival erythema  ENT: No buccal lesion, no pharyngeal exudates  Neck: No nuchal rigidity, no cervical adenopathy  Lungs: Clear breath sounds, no crackles, no wheezes  Heart: Regular rate and rhythm, no murmurs  Abdomen: Bowel sounds present, soft, nontender  Skin: Warm and dry, no active dermatoses  Musculoskeletal: No joint erythema, no joint swelling. Sacral pressure ulcer stage 3 clean with no active purulence, surrounding necrosis and malodor    Labs, imaging, and medical records/notes were personally reviewed. Assessment:  Lung infiltrates, right, suspect aspiration pneumonia  Abnormal CT abdomen and pelvis. Per reports, erosive change on inferior sacrum/coccyx suspicious for osteomyelitis is similar to that from 04/12/2022, likely chronic findings and no clinical findings suggestive of acute osteomyelitis. Sacral pressure ulcer, stage 3, with no signs of gross infection  ESRD on hemodialysis    Plan:  Start amoxicillin-clavulanate 500-125 mg q24h to finish 5 days of antibiiotic therapy. Observe pressure ulcer precautions. Continue local wound care. Continue supportive care. Thank you for involving me in the care of [employer]. I will sign off for now. Please do not hesitate to call for any questions, concerns, or new findings.     Electronically signed by Rima Truong MD on 2/17/2023 at 11:16 AM

## 2023-02-17 NOTE — PROGRESS NOTES
[facility]  Nephrology Progress Note    Patient's Name: Cam Herr    History of Present Illness from 2/16 consult note:  \"Marisela Avelar is a 67 y.o. female with a past medical history of COVID, hypertension, ESRD, and anemia. She presented to the ED on 2/15 with complaints of fatigue and lethargy. Vital signs on 2/15 includes temperature 97.6, respirations 16, pulse 82, /75, and she was 92% on room air. Lab data on 2/15 includes CO2 30, creatinine 4.7, albumin 3.2, and hemoglobin 11. She had a chest x-ray on 2/15 which showed pulmonary infiltrates on the right. CT of the head on 2/15 showed no acute intracranial abnormality. CT abdomen pelvis 2/15 showed inferior right upper lobe and bilateral lower lobe infiltrates, decubitus ulcer with erosive change suspicious for osteomyelitis. CT chest 2/16 showed moderate bilateral pleural effusions and new bilateral lung infiltrates. We were consulted to see the patient for hemodialysis. Patient is known to our service and dialyzes at the dialysis den at [facility] At present, patient was seen and examined. She reports that she feels alright. She reports that she came in because she was believed to be lethargic. She denies any chest pain or shortness of breath. She reports intermittent nausea. She denies any abdominal pain, vomiting, or diarrhea. She denies any fevers or chills. She denies any dizziness. She reports that her appetite has been fair. \"    Subjective:    2/17: Patient was seen and examined. She reports that she feels alright. She denies any chest pain or shortness of breath.   She denies any abdominal pain, nausea, or vomiting    PMH:    Past Medical History:   Diagnosis Date    Anemia in chronic kidney disease     Anxiety and depression     Arthritis     Chronic pain syndrome     COVID-19 05/2020    COVID-19     Decreased dorsalis pedis pulse 10/25/2022    Diabetes mellitus ([facility] [address]     Dysphagia, oral phase     ESRD on hemodialysis ([facility] [address] 10/25/2022    GERD (gastroesophageal reflux disease)     Hemodialysis patient ([facility] [address]     M-W-F    Hyperkalemia     Hypertension     Hypertensive kidney disease with stage 4 chronic kidney disease (HCC)     Insomnia     Kidney stones     MDD (major depressive disorder)     Moderate protein-calorie malnutrition (HCC)     Morbid obesity (HCC)     Muscle weakness (generalized)     Obesity     Pressure injury of sacral region, stage 3 ([address] 10/25/2022    Pressure ulcer of sacral region     Sacral pressure ulcer 08/03/2021    stage 4    Unspecified osteoarthritis, unspecified site     Weakness generalized        Patient Active Problem List   Diagnosis    Neurologic gait dysfunction    Diabetes mellitus ([address]    Hypertension    Arthritis    Knee problem    Anemia due to stage 3 chronic kidney disease    Primary osteoarthritis of both knees    Moderate protein-calorie malnutrition (HCC)    Pressure injury of sacral region, stage 4 (HCC)    Pressure injury of right hip, stage 3 (HCC)    Left bundle branch block    Moderate obesity    Failure to thrive in adult    Pressure injury of sacral region, stage 3 (HCC)    Decreased dorsalis pedis pulse    ESRD on hemodialysis ([address]    Closed bicondylar fracture of distal femur, right, initial encounter ([address]    Other fracture of right femur, initial encounter for closed fracture ([address]    Closed fracture of distal ends of left radius and ulna    Closed bicondylar fracture of distal end of right femur ([address]    Metabolic encephalopathy    Acute respiratory failure with hypoxia ([address]    Pneumonia due to infectious organism, unspecified laterality, unspecified part of lung    Pneumonia due to organism    AMS (altered mental status)       Diet:    ADULT DIET; Regular  ADULT ORAL NUTRITION SUPPLEMENT; Breakfast, Dinner;  Wound Healing Oral Supplement  ADULT ORAL NUTRITION SUPPLEMENT; Lunch; Renal Oral Supplement    Meds:     busPIRone  10 mg Oral Daily    escitalopram  10 mg Oral Daily    gabapentin  300 mg Oral Daily    insulin glargine-yfgn  6 Units SubCUTAneous Nightly    metoprolol succinate  25 mg Oral Daily    senna  2 tablet Oral BID    traZODone  25 mg Oral Nightly    sodium chloride flush  5-40 mL IntraVENous 2 times per day    insulin lispro  0-8 Units SubCUTAneous TID WC    insulin lispro  0-4 Units SubCUTAneous Nightly    miconazole   Topical BID        sodium chloride      dextrose         Meds prn:     bisacodyl, guaiFENesin, lactulose, oxyCODONE-acetaminophen, sodium chloride flush, sodium chloride, acetaminophen **OR** acetaminophen, glucose, dextrose bolus **OR** dextrose bolus, glucagon (rDNA), dextrose, polyethylene glycol    Meds prior to admission:     No current facility-administered medications on file prior to encounter.      Current Outpatient Medications on File Prior to Encounter   Medication Sig Dispense Refill    glucose 4 g chewable tablet Take 4 tablets by mouth as needed for Low blood sugar 60 tablet 3    ferric citrate (AURYXIA) 1  MG(Fe) TABS tablet Take 420 mg by mouth 3 times daily (with meals)      benzonatate (TESSALON) 100 MG capsule Take 100 mg by mouth 3 times daily as needed for Cough      bisacodyl (DULCOLAX) 10 MG suppository Place 10 mg rectally daily as needed for Constipation      sodium phosphate (FLEET) 7-19 GM/118ML Place 1 enema rectally once as needed (constipation)      glucose (GLUTOSE) 40 % GEL Take 15 g by mouth as needed (hypoglycemia)      guaiFENesin 200 MG/5ML LIQD Take 200 mg by mouth every 6 hours as needed (cough)      insulin lispro, 1 Unit Dial, (HUMALOG/ADMELOG) 100 UNIT/ML SOPN Inject 0-10 Units into the skin 3 times daily (before meals) *Per Sliding Scale*      lactulose (CHRONULAC) 10 GM/15ML solution Take 10 g by mouth daily as needed (constipation)      busPIRone (BUSPAR) 10 MG tablet Take 10 mg by mouth daily      polyethylene glycol (GLYCOLAX) 17 g packet Take 17 g by mouth daily as needed for Constipation oxyCODONE-acetaminophen (PERCOCET) 5-325 MG per tablet Take 1 tablet by mouth every 6 hours as needed for Pain.      epoetin derek-epbx (RETACRIT) 18750 UNIT/ML SOLN injection Inject 10,000 Units into the skin See Admin Instructions Given Tuesday,Thursday,Saturday      senna (SENOKOT) 8.6 MG tablet Take 2 tablets by mouth 2 times daily      traZODone (DESYREL) 50 MG tablet Take 25 mg by mouth nightly      acetaminophen (TYLENOL) 325 MG tablet Take 650 mg by mouth every 4 hours as needed for Pain or Fever      vitamin D (CHOLECALCIFEROL) 25 MCG (1000 UT) TABS tablet Take 1,000 Units by mouth daily      ondansetron (ZOFRAN) 4 MG tablet Take 4 mg by mouth every 12 hours as needed for Nausea or Vomiting      insulin glargine (LANTUS) 100 UNIT/ML injection vial Inject 6 Units into the skin nightly 10 mL 3    midodrine (PROAMATINE) 5 MG tablet Take 15 mg by mouth See Admin Instructions Given Monday,Wednesday,Friday      metoprolol succinate (TOPROL XL) 25 MG extended release tablet Take 1 tablet by mouth daily 30 tablet 3    escitalopram (LEXAPRO) 10 MG tablet Take 10 mg by mouth daily      gabapentin (NEURONTIN) 300 MG capsule Take 300 mg by mouth daily. Allergies:    Patient has no known allergies. Social History:     reports that she quit smoking about 11 years ago. Her smoking use included cigarettes. She has a 30.00 pack-year smoking history. She has never used smokeless tobacco. She reports that she does not drink alcohol and does not use drugs.     Family History:         Problem Relation Age of Onset    Cancer Sister      Physical Exam:      Patient Vitals for the past 24 hrs:   BP Temp Temp src Pulse Resp SpO2 Height Weight   02/17/23 0818 (!) 115/58 (!) 95.9 °F (35.5 °C) Temporal 61 18 99 % -- --   02/17/23 0417 -- -- -- -- -- -- -- 191 lb 12.8 oz (87 kg)   02/16/23 2127 -- -- -- -- 18 -- -- --   02/16/23 2030 (!) 105/51 97.5 °F (36.4 °C) Oral 70 18 97 % -- --   02/16/23 1544 (!) 124/35 96.9 °F (36.1 °C) -- 68 -- -- -- 202 lb 6.1 oz (91.8 kg)   02/16/23 1529 (!) 122/36 -- -- 68 -- -- -- --   02/16/23 1500 (!) 101/39 -- -- 67 -- -- -- --   02/16/23 1430 (!) 125/56 -- -- 75 -- -- -- --   02/16/23 1400 (!) 127/38 -- -- 60 -- -- -- --   02/16/23 1330 (!) 126/48 -- -- 62 -- -- -- --   02/16/23 1300 (!) 151/36 -- -- 56 -- -- -- --   02/16/23 1230 (!) 145/38 -- -- 58 -- -- -- --   02/16/23 1214 (!) 142/46 96.8 °F (36 °C) -- 67 16 -- -- 208 lb 5.4 oz (94.5 kg)   02/16/23 1057 -- -- -- -- -- -- 5' 1\" (1.549 m) --   02/16/23 0945 (!) 162/90 98 °F (36.7 °C) Axillary 80 18 97 % -- --           Intake/Output Summary (Last 24 hours) at 2/17/2023 0910  Last data filed at 2/16/2023 1544  Gross per 24 hour   Intake 300 ml   Output 2400 ml   Net -2100 ml       General: Awake, alert, no acute distress  Neck: No JVD noted  Lungs: Clear bilaterally upper, diminished to the bases bilaterally. Unlabored  CV: Regular rate and rhythm. No rub  Abd: Soft, nontender, nondistended. Active bowel sounds  Skin: Warm and dry. No rash on exposed extremities; dressing noted to RLE  Ext: Trace BLE edema   Neuro: Awake, answers questions appropriately    Data:    Recent Labs     02/15/23  1155 02/17/23  0622   WBC 8.3 4.9   HGB 11.0* 9.5*   HCT 34.8 30.4*   .1* 114.3*    198         Recent Labs     02/15/23  1155 02/16/23  1114 02/17/23  0622    149* 145   K 4.8 4.7 3.8    106 102   CO2 30* 26 32*   CREATININE 4.7* 5.8* 3.9*   BUN 23 32* 16   LABGLOM 9 7 12   GLUCOSE 107* 143* 85   CALCIUM 9.7 9.1 8.9   PHOS  --   --  3.9   MG  --   --  1.9         Vit D, 25-Hydroxy   Date Value Ref Range Status   11/11/2022 32 30 - 100 ng/mL Final     Comment:     <20 ng/mL. ........... Aimeee Brandyn Deficient  20-30 ng/mL. ......... Paullette Raciro Insufficient   ng/mL. ........ Huylette Raciro Sufficient  >100 ng/mL. .......... Huylette Raciro Toxic         PTH   Date Value Ref Range Status   11/06/2022 466 (H) 15 - 65 pg/mL Final       Recent Labs     02/15/23  1155   ALT 7   AST 15 ALKPHOS 76   BILITOT 0.4         Recent Labs     02/15/23  1155   LABALBU 3.2*         Ferritin   Date Value Ref Range Status   05/05/2021 767 ng/mL Final     Comment:     FERRITIN Reference Ranges:  Adult Males   20 - 60 years:    30 - 400 ng/mL  Adult females 16 - 61 years:    15 - 150 ng/mL  Adults greater than 60 years:   no established reference range  Pediatrics:                     no established reference range       Iron   Date Value Ref Range Status   05/05/2021 74 37 - 145 mcg/dL Final     TIBC   Date Value Ref Range Status   05/05/2021 116 (L) 250 - 450 mcg/dL Final       Vitamin B-12   Date Value Ref Range Status   05/05/2021 895 211 - 946 pg/mL Final       Folate   Date Value Ref Range Status   05/05/2021 >20.0 4.8 - 24.2 ng/mL Final       Lab Results   Component Value Date/Time    COLORU Yellow 02/15/2023 11:55 AM    NITRU Negative 02/15/2023 11:55 AM    GLUCOSEU 100 02/15/2023 11:55 AM    GLUCOSEU NEGATIVE 08/17/2011 10:30 PM    KETUA Negative 02/15/2023 11:55 AM    UROBILINOGEN 0.2 02/15/2023 11:55 AM    BILIRUBINUR Negative 02/15/2023 11:55 AM    BILIRUBINUR NEGATIVE 08/17/2011 10:30 PM       No results found for: MAGDA, CREURRAN, MACREATRATIO, OSMOU    No components found for: URIC    No results found for: LIPIDPAN    Assessment and Plans:    ESRD on HD  Outpatient at the dialysis den at [facility] HD while inpatient     2. Bilateral pleural effusion  CT chest 2/16 moderate bilateral pleural effusions and new bilateral lung infiltrates  On Omnicef  Pulmonology and ID consulted     3.   Anemia of chronic kidney disease  Hemoglobin target 10-12  Hemoglobin 9.5 today   Transfuse for hemoglobin<7  Monitor H&H     4. secondary hyperparathyroidism of renal origin   and phosphorus 5 on 2/6 in the outpatient setting  Phos 3.9 today   Monitor labs    5. wound  CT abd pelvis 2/15 decubitus ulcer with erosive change suspicious for osteomyelitis  Management per primary service/wound care/ID    Severo Long, APRN - CNP    Patient seen and examined all key components of the physical performed independently , case discussed with NP, all pertinent labs and radiologic tests personally reviewed agree with above. Lenin Mckeon MD    Department of Internal Medicine  Section of Nephrology  Dialysis Note        PROCEDURE:  Patient seen on hemodialysis     PHYSICIAN:  Jt Regalado M.D., FACP    INDICATION:  End-stage renal disease    RX:  See dialysis flowsheet for specifics on access, blood flow rate, dialysate baths, duration of dialysis, anticoagulation and other technical information.     COMMENTS:  Procedure in progress and tolerated       Lenin Mckeon MD, MD

## 2023-02-18 VITALS
HEIGHT: 61 IN | BODY MASS INDEX: 35.8 KG/M2 | WEIGHT: 189.6 LBS | OXYGEN SATURATION: 96 % | TEMPERATURE: 97.3 F | HEART RATE: 78 BPM | DIASTOLIC BLOOD PRESSURE: 68 MMHG | RESPIRATION RATE: 18 BRPM | SYSTOLIC BLOOD PRESSURE: 141 MMHG

## 2023-02-18 PROBLEM — R41.82 AMS (ALTERED MENTAL STATUS): Status: RESOLVED | Noted: 2023-02-16 | Resolved: 2023-02-18

## 2023-02-18 PROBLEM — G93.41 METABOLIC ENCEPHALOPATHY: Status: RESOLVED | Noted: 2022-11-29 | Resolved: 2023-02-18

## 2023-02-18 PROBLEM — J96.01 ACUTE RESPIRATORY FAILURE WITH HYPOXIA (HCC): Status: RESOLVED | Noted: 2023-02-09 | Resolved: 2023-02-18

## 2023-02-18 LAB
ANION GAP SERPL CALCULATED.3IONS-SCNC: 9 MMOL/L (ref 7–16)
BUN BLDV-MCNC: 30 MG/DL (ref 6–23)
CALCIUM SERPL-MCNC: 9.6 MG/DL (ref 8.6–10.2)
CHLORIDE BLD-SCNC: 101 MMOL/L (ref 98–107)
CO2: 29 MMOL/L (ref 22–29)
CREAT SERPL-MCNC: 5.5 MG/DL (ref 0.5–1)
GFR SERPL CREATININE-BSD FRML MDRD: 8 ML/MIN/1.73
GLUCOSE BLD-MCNC: 121 MG/DL (ref 74–99)
HCT VFR BLD CALC: 33.1 % (ref 34–48)
HEMOGLOBIN: 10.3 G/DL (ref 11.5–15.5)
MAGNESIUM: 2.1 MG/DL (ref 1.6–2.6)
MCH RBC QN AUTO: 35.6 PG (ref 26–35)
MCHC RBC AUTO-ENTMCNC: 31.1 % (ref 32–34.5)
MCV RBC AUTO: 114.5 FL (ref 80–99.9)
METER GLUCOSE: 112 MG/DL (ref 74–99)
METER GLUCOSE: 87 MG/DL (ref 74–99)
PDW BLD-RTO: 14.9 FL (ref 11.5–15)
PHOSPHORUS: 4.7 MG/DL (ref 2.5–4.5)
PLATELET # BLD: 202 E9/L (ref 130–450)
PMV BLD AUTO: 10.8 FL (ref 7–12)
POTASSIUM SERPL-SCNC: 4.2 MMOL/L (ref 3.5–5)
RBC # BLD: 2.89 E12/L (ref 3.5–5.5)
SODIUM BLD-SCNC: 139 MMOL/L (ref 132–146)
WBC # BLD: 5.4 E9/L (ref 4.5–11.5)

## 2023-02-18 PROCEDURE — 85027 COMPLETE CBC AUTOMATED: CPT

## 2023-02-18 PROCEDURE — 6370000000 HC RX 637 (ALT 250 FOR IP): Performed by: FAMILY MEDICINE

## 2023-02-18 PROCEDURE — 84100 ASSAY OF PHOSPHORUS: CPT

## 2023-02-18 PROCEDURE — 6370000000 HC RX 637 (ALT 250 FOR IP): Performed by: INTERNAL MEDICINE

## 2023-02-18 PROCEDURE — 36415 COLL VENOUS BLD VENIPUNCTURE: CPT

## 2023-02-18 PROCEDURE — 90935 HEMODIALYSIS ONE EVALUATION: CPT

## 2023-02-18 PROCEDURE — 80048 BASIC METABOLIC PNL TOTAL CA: CPT

## 2023-02-18 PROCEDURE — 83735 ASSAY OF MAGNESIUM: CPT

## 2023-02-18 PROCEDURE — 82962 GLUCOSE BLOOD TEST: CPT

## 2023-02-18 RX ORDER — AMOXICILLIN AND CLAVULANATE POTASSIUM 500; 125 MG/1; MG/1
1 TABLET, FILM COATED ORAL
Qty: 4 TABLET | Refills: 0 | DISCHARGE
Start: 2023-02-18 | End: 2023-02-22

## 2023-02-18 RX ADMIN — AMOXICILLIN AND CLAVULANATE POTASSIUM 1 TABLET: 500; 125 TABLET, FILM COATED ORAL at 13:35

## 2023-02-18 RX ADMIN — ESCITALOPRAM OXALATE 10 MG: 10 TABLET, FILM COATED ORAL at 13:36

## 2023-02-18 RX ADMIN — METOPROLOL SUCCINATE 25 MG: 25 TABLET, EXTENDED RELEASE ORAL at 13:35

## 2023-02-18 RX ADMIN — GABAPENTIN 300 MG: 300 CAPSULE ORAL at 13:35

## 2023-02-18 RX ADMIN — BUSPIRONE HYDROCHLORIDE 10 MG: 10 TABLET ORAL at 13:35

## 2023-02-18 ASSESSMENT — PAIN SCALES - GENERAL: PAINLEVEL_OUTOF10: 0

## 2023-02-18 NOTE — PROGRESS NOTES
Pt pulled out her iv. Pt refuses to have another iv placed. Pt refuses turns and refuses to wear he heels protectors.

## 2023-02-18 NOTE — PROGRESS NOTES
[facility]  Nephrology Progress Note    Patient's Name: Jonelle Reynolds    History of Present Illness from 2/16 consult note:  \"Marisela Jarrett is a 67 y.o. female with a past medical history of COVID, hypertension, ESRD, and anemia. She presented to the ED on 2/15 with complaints of fatigue and lethargy. Vital signs on 2/15 includes temperature 97.6, respirations 16, pulse 82, /75, and she was 92% on room air. Lab data on 2/15 includes CO2 30, creatinine 4.7, albumin 3.2, and hemoglobin 11. She had a chest x-ray on 2/15 which showed pulmonary infiltrates on the right. CT of the head on 2/15 showed no acute intracranial abnormality. CT abdomen pelvis 2/15 showed inferior right upper lobe and bilateral lower lobe infiltrates, decubitus ulcer with erosive change suspicious for osteomyelitis. CT chest 2/16 showed moderate bilateral pleural effusions and new bilateral lung infiltrates. We were consulted to see the patient for hemodialysis. Patient is known to our service and dialyzes at the dialysis den at [facility]. At present, patient was seen and examined. She reports that she feels alright. She reports that she came in because she was believed to be lethargic. She denies any chest pain or shortness of breath. She reports intermittent nausea. She denies any abdominal pain, vomiting, or diarrhea. She denies any fevers or chills. She denies any dizziness. She reports that her appetite has been fair. \"    Subjective:    2/18: Patient was seen and examined. She reports that she feels okay. She denies any chest pain or shortness of breath. She denies any abdominal pain, nausea, or vomiting. She is for possible discharge today.     PMH:    Past Medical History:   Diagnosis Date    Anemia in chronic kidney disease     Anxiety and depression     Arthritis     Chronic pain syndrome     COVID-19 05/2020    COVID-19     Decreased dorsalis pedis pulse 10/25/2022    Diabetes mellitus ([address] Dysphagia, oral phase     ESRD on hemodialysis ([address] 10/25/2022    GERD (gastroesophageal reflux disease)     Hemodialysis patient ([facility]     M-W-F    Hyperkalemia     Hypertension     Hypertensive kidney disease with stage 4 chronic kidney disease (HCC)     Insomnia     Kidney stones     MDD (major depressive disorder)     Moderate protein-calorie malnutrition (HCC)     Morbid obesity (HCC)     Muscle weakness (generalized)     Obesity     Pressure injury of sacral region, stage 3 ([address] 10/25/2022    Pressure ulcer of sacral region     Sacral pressure ulcer 08/03/2021    stage 4    Unspecified osteoarthritis, unspecified site     Weakness generalized        Patient Active Problem List   Diagnosis    Neurologic gait dysfunction    Diabetes mellitus ([address]    Hypertension    Arthritis    Knee problem    Anemia due to stage 3 chronic kidney disease    Primary osteoarthritis of both knees    Moderate protein-calorie malnutrition (HCC)    Pressure injury of sacral region, stage 4 (HCC)    Pressure injury of right hip, stage 3 (HCC)    Left bundle branch block    Moderate obesity    Failure to thrive in adult    Pressure injury of sacral region, stage 3 (HCC)    Decreased dorsalis pedis pulse    ESRD on hemodialysis ([facility]    Closed bicondylar fracture of distal femur, right, initial encounter ([address]    Other fracture of right femur, initial encounter for closed fracture ([address]    Closed fracture of distal ends of left radius and ulna    Closed bicondylar fracture of distal end of right femur ([address]    Pneumonia due to infectious organism, unspecified laterality, unspecified part of lung    Pneumonia due to organism       Diet:    ADULT DIET; Regular  ADULT ORAL NUTRITION SUPPLEMENT; Breakfast, Dinner;  Wound Healing Oral Supplement  ADULT ORAL NUTRITION SUPPLEMENT; Lunch; Renal Oral Supplement    Meds:     amoxicillin-clavulanate  1 tablet Oral Daily    busPIRone  10 mg Oral Daily    escitalopram  10 mg Oral Daily gabapentin  300 mg Oral Daily    insulin glargine-yfgn  6 Units SubCUTAneous Nightly    metoprolol succinate  25 mg Oral Daily    senna  2 tablet Oral BID    traZODone  25 mg Oral Nightly    sodium chloride flush  5-40 mL IntraVENous 2 times per day    insulin lispro  0-8 Units SubCUTAneous TID WC    insulin lispro  0-4 Units SubCUTAneous Nightly    miconazole   Topical BID        sodium chloride      dextrose         Meds prn:     bisacodyl, guaiFENesin, lactulose, oxyCODONE-acetaminophen, sodium chloride flush, sodium chloride, acetaminophen **OR** acetaminophen, glucose, dextrose bolus **OR** dextrose bolus, glucagon (rDNA), dextrose, polyethylene glycol    Meds prior to admission:     No current facility-administered medications on file prior to encounter.      Current Outpatient Medications on File Prior to Encounter   Medication Sig Dispense Refill    glucose 4 g chewable tablet Take 4 tablets by mouth as needed for Low blood sugar 60 tablet 3    ferric citrate (AURYXIA) 1  MG(Fe) TABS tablet Take 420 mg by mouth 3 times daily (with meals)      benzonatate (TESSALON) 100 MG capsule Take 100 mg by mouth 3 times daily as needed for Cough      bisacodyl (DULCOLAX) 10 MG suppository Place 10 mg rectally daily as needed for Constipation      sodium phosphate (FLEET) 7-19 GM/118ML Place 1 enema rectally once as needed (constipation)      glucose (GLUTOSE) 40 % GEL Take 15 g by mouth as needed (hypoglycemia)      guaiFENesin 200 MG/5ML LIQD Take 200 mg by mouth every 6 hours as needed (cough)      insulin lispro, 1 Unit Dial, (HUMALOG/ADMELOG) 100 UNIT/ML SOPN Inject 0-10 Units into the skin 3 times daily (before meals) *Per Sliding Scale*      lactulose (CHRONULAC) 10 GM/15ML solution Take 10 g by mouth daily as needed (constipation)      busPIRone (BUSPAR) 10 MG tablet Take 10 mg by mouth daily      polyethylene glycol (GLYCOLAX) 17 g packet Take 17 g by mouth daily as needed for Constipation oxyCODONE-acetaminophen (PERCOCET) 5-325 MG per tablet Take 1 tablet by mouth every 6 hours as needed for Pain.      epoetin derek-epbx (RETACRIT) 43521 UNIT/ML SOLN injection Inject 10,000 Units into the skin See Admin Instructions Given Tuesday,Thursday,Saturday      senna (SENOKOT) 8.6 MG tablet Take 2 tablets by mouth 2 times daily      traZODone (DESYREL) 50 MG tablet Take 25 mg by mouth nightly      acetaminophen (TYLENOL) 325 MG tablet Take 650 mg by mouth every 4 hours as needed for Pain or Fever      vitamin D (CHOLECALCIFEROL) 25 MCG (1000 UT) TABS tablet Take 1,000 Units by mouth daily      ondansetron (ZOFRAN) 4 MG tablet Take 4 mg by mouth every 12 hours as needed for Nausea or Vomiting      insulin glargine (LANTUS) 100 UNIT/ML injection vial Inject 6 Units into the skin nightly 10 mL 3    midodrine (PROAMATINE) 5 MG tablet Take 15 mg by mouth See Admin Instructions Given Monday,Wednesday,Friday      metoprolol succinate (TOPROL XL) 25 MG extended release tablet Take 1 tablet by mouth daily 30 tablet 3    escitalopram (LEXAPRO) 10 MG tablet Take 10 mg by mouth daily      gabapentin (NEURONTIN) 300 MG capsule Take 300 mg by mouth daily. Allergies:    Patient has no known allergies. Social History:     reports that she quit smoking about 11 years ago. Her smoking use included cigarettes. She has a 30.00 pack-year smoking history. She has never used smokeless tobacco. She reports that she does not drink alcohol and does not use drugs.     Family History:         Problem Relation Age of Onset    Cancer Sister      Physical Exam:      Patient Vitals for the past 24 hrs:   BP Temp Temp src Pulse Resp SpO2 Weight   02/18/23 0900 (!) 123/49 -- -- 66 -- -- --   02/18/23 0830 (!) 131/49 -- -- 71 -- -- --   02/18/23 0800 (!) 135/52 -- -- 70 -- -- --   02/18/23 0718 (!) 190/70 -- -- 68 -- -- --   02/18/23 0710 (!) 191/73 97 °F (36.1 °C) -- 68 20 -- 194 lb 7.1 oz (88.2 kg)   02/18/23 0359 -- -- -- -- -- -- 194 lb 0.1 oz (88 kg)   02/17/23 2206 -- -- -- -- 18 -- --   02/17/23 2015 (!) 117/102 97.2 °F (36.2 °C) Temporal 70 18 95 % --   02/17/23 1618 (!) 197/115 -- -- -- -- -- --   02/17/23 1615 (!) 226/95 97.7 °F (36.5 °C) Temporal 63 -- 96 % --         No intake or output data in the 24 hours ending 02/18/23 0914    General: Awake, alert, no acute distress  Neck: No JVD noted  Lungs: Clear bilaterally upper, diminished to the bases bilaterally. Unlabored  CV: Regular rate and rhythm. No rub  Abd: Soft, nontender, nondistended. Active bowel sounds  Skin: Warm and dry. No rash on exposed extremities; dressing noted to RLE  Ext: Trace BLE edema   Neuro: Awake, answers questions appropriately    Data:    Recent Labs     02/15/23  1155 02/17/23  0622 02/18/23  0503   WBC 8.3 4.9 5.4   HGB 11.0* 9.5* 10.3*   HCT 34.8 30.4* 33.1*   .1* 114.3* 114.5*    198 202         Recent Labs     02/16/23  1114 02/17/23  0622 02/18/23  0503   * 145 139   K 4.7 3.8 4.2    102 101   CO2 26 32* 29   CREATININE 5.8* 3.9* 5.5*   BUN 32* 16 30*   LABGLOM 7 12 8   GLUCOSE 143* 85 121*   CALCIUM 9.1 8.9 9.6   PHOS  --  3.9 4.7*   MG  --  1.9 2.1         Vit D, 25-Hydroxy   Date Value Ref Range Status   11/11/2022 32 30 - 100 ng/mL Final     Comment:     <20 ng/mL. ........... Ana Zurita Deficient  20-30 ng/mL. ......... Ana Radzak Insufficient   ng/mL. ........ Ana Raddle Sufficient  >100 ng/mL. .......... Ana Zurita Toxic         PTH   Date Value Ref Range Status   11/06/2022 466 (H) 15 - 65 pg/mL Final       Recent Labs     02/15/23  1155   ALT 7   AST 15   ALKPHOS 76   BILITOT 0.4         Recent Labs     02/15/23  1155   LABALBU 3.2*         Ferritin   Date Value Ref Range Status   05/05/2021 767 ng/mL Final     Comment:     FERRITIN Reference Ranges:  Adult Males   20 - 60 years:    27 - 400 ng/mL  Adult females 16 - 61 years:    15 - 150 ng/mL  Adults greater than 60 years:   no established reference range  Pediatrics:                     no established reference range       Iron   Date Value Ref Range Status   05/05/2021 74 37 - 145 mcg/dL Final     TIBC   Date Value Ref Range Status   05/05/2021 116 (L) 250 - 450 mcg/dL Final       Vitamin B-12   Date Value Ref Range Status   05/05/2021 895 211 - 946 pg/mL Final       Folate   Date Value Ref Range Status   05/05/2021 >20.0 4.8 - 24.2 ng/mL Final       Lab Results   Component Value Date/Time    COLORU Yellow 02/15/2023 11:55 AM    NITRU Negative 02/15/2023 11:55 AM    GLUCOSEU 100 02/15/2023 11:55 AM    GLUCOSEU NEGATIVE 08/17/2011 10:30 PM    KETUA Negative 02/15/2023 11:55 AM    UROBILINOGEN 0.2 02/15/2023 11:55 AM    BILIRUBINUR Negative 02/15/2023 11:55 AM    BILIRUBINUR NEGATIVE 08/17/2011 10:30 PM       No results found for: MAGDA, CREURRAN, MACREATRATIO, OSMOU    No components found for: URIC    No results found for: LIPIDPAN    Assessment and Plans:    ESRD on HD  Outpatient at the dialysis den at [facility] HD while inpatient     2. Bilateral pleural effusion  CT chest 2/16 moderate bilateral pleural effusions and new bilateral lung infiltrates  On antibiotics  Pulmonology and ID consulted     3.   Anemia of chronic kidney disease  Hemoglobin target 10-12  Hemoglobin 10.3 today -at target  Transfuse for hemoglobin<7  Monitor H&H     4. secondary hyperparathyroidism of renal origin   and phosphorus 5 on 2/6 in the outpatient setting  Phos 4.7 today   Monitor labs    5. wound  CT abd pelvis 2/15 decubitus ulcer with erosive change suspicious for osteomyelitis  Management per primary service/wound care/ID    Yuri Baca, APRN - CNP  Pt seen and examined agree with above  On hd today  Tolerated well  Sob improved  Refused thoracentesis  Lindsay Sanchez MD

## 2023-02-18 NOTE — PROGRESS NOTES
Speech Language Pathology      NAME:  Kari Macias  :  1950  DATE: 2023  ROOM:  [facility]/[address]    Attempted MBSS, however Pt currently in dialysis. Per HD staff, Pt refused to come to [facility] after HD. Will be available for re-attempt on  if Pt is agreeable.      Thank you    Pneumonia due to organism [J18.9]  Hypoxia [R09.02]  Altered mental status, unspecified altered mental status type [R41.82]  Pneumonia of right upper lobe due to infectious organism [J18.9]  Other fatigue [R53.83]  Pneumonia due to infectious organism, unspecified laterality, unspecified part of lung [J18.9]

## 2023-02-18 NOTE — CARE COORDINATION
2/18:  Update CM note:  Pt is being dc to [facility] today. Arrange  at 2pm with Alison Peñaloza.   Electronically signed by Cornelius Mcdaniels RN on 2/18/2023 at 8:34 AM

## 2023-02-18 NOTE — FLOWSHEET NOTE
02/18/23 1050   Vital Signs   /78   Temp 97.1 °F (36.2 °C)   Heart Rate 89   Resp 20   Weight 189 lb 9.5 oz (86 kg)   Weight Method Bed scale   Percent Weight Change -2.49   Pain Assessment   Pain Assessment None - Denies Pain   Post-Hemodialysis Assessment   Machine Disinfection Process Exterior Machine Disinfection   Rinseback Volume (ml) 300 ml   Blood Volume Processed (Liters) 57.3 l/min   Dialyzer Clearance Moderately streaked   Duration of Treatment (minutes) 210 minutes   Heparin Amount Administered During Treatment (mL) 0 mL   Hemodialysis Intake (ml) 300 ml   Hemodialysis Output (ml) 2500 ml   NET Removed (ml) 2200   Tolerated Treatment Good   Patient Response to Treatment tolerated well   Bilateral Breath Sounds Diminished   RLE Edema Trace   LLE Edema Trace   Physician Notified No   Time Off 1048   Patient Disposition Return to room

## 2023-02-18 NOTE — PROGRESS NOTES
Subjective: The patient is awake and alert. No problems overnight. Denies chest pain, angina, and dyspnea. Denies abdominal pain. Tolerating diet. No nausea or vomiting. Refused thoracentesis    Objective:    BP (!) 117/102   Pulse 70   Temp 97.2 °F (36.2 °C) (Temporal)   Resp 18   Ht 5' 1\" (1.549 m)   Wt 194 lb 0.1 oz (88 kg)   SpO2 95%   BMI 36.66 kg/m²     Heart:  RRR, no murmurs, gallops, or rubs.   Lungs:  CTA bilaterally, no wheeze, rales or rhonchi  Abd: bowel sounds present, nontender, nondistended, no masses  Extrem:  No clubbing, cyanosis, or edema    CBC with Differential:    Lab Results   Component Value Date/Time    WBC 5.4 02/18/2023 05:03 AM    RBC 2.89 02/18/2023 05:03 AM    HGB 10.3 02/18/2023 05:03 AM    HCT 33.1 02/18/2023 05:03 AM     02/18/2023 05:03 AM    .5 02/18/2023 05:03 AM    MCH 35.6 02/18/2023 05:03 AM    MCHC 31.1 02/18/2023 05:03 AM    RDW 14.9 02/18/2023 05:03 AM    NRBC 0.0 11/26/2022 07:02 AM    SEGSPCT 71 08/16/2013 05:00 AM    BANDSPCT 1 03/29/2016 07:37 PM    METASPCT 0.9 05/10/2020 03:45 AM    LYMPHOPCT 15.8 02/15/2023 11:55 AM    PROMYELOPCT 0.9 11/07/2022 05:59 AM    MONOPCT 13.2 02/15/2023 11:55 AM    MYELOPCT 0.9 11/26/2022 07:02 AM    BASOPCT 1.2 02/15/2023 11:55 AM    MONOSABS 1.10 02/15/2023 11:55 AM    LYMPHSABS 1.32 02/15/2023 11:55 AM    EOSABS 0.17 02/15/2023 11:55 AM    BASOSABS 0.10 02/15/2023 11:55 AM     CMP:    Lab Results   Component Value Date/Time     02/17/2023 06:22 AM    K 3.8 02/17/2023 06:22 AM    K 3.8 11/30/2022 07:25 AM     02/17/2023 06:22 AM    CO2 32 02/17/2023 06:22 AM    BUN 16 02/17/2023 06:22 AM    CREATININE 3.9 02/17/2023 06:22 AM    GFRAA 7 10/12/2022 03:49 AM    LABGLOM 12 02/17/2023 06:22 AM    GLUCOSE 85 02/17/2023 06:22 AM    GLUCOSE 149 10/12/2011 09:59 AM    PROT 6.9 02/15/2023 11:55 AM    LABALBU 3.2 02/15/2023 11:55 AM    LABALBU 4.1 10/12/2011 09:59 AM    CALCIUM 8.9 02/17/2023 06:22 AM BILITOT 0.4 02/15/2023 11:55 AM    ALKPHOS 76 02/15/2023 11:55 AM    AST 15 02/15/2023 11:55 AM    ALT 7 02/15/2023 11:55 AM        Assessment:    Patient Active Problem List   Diagnosis    Neurologic gait dysfunction    Diabetes mellitus ([address]    Hypertension    Arthritis    Knee problem    Anemia due to stage 3 chronic kidney disease    Primary osteoarthritis of both knees    Moderate protein-calorie malnutrition (HCC)    Pressure injury of sacral region, stage 4 (HCC)    Pressure injury of right hip, stage 3 (HCC)    Left bundle branch block    Moderate obesity    Failure to thrive in adult    Pressure injury of sacral region, stage 3 (HCC)    Decreased dorsalis pedis pulse    ESRD on hemodialysis ([address]    Closed bicondylar fracture of distal femur, right, initial encounter ([address]    Other fracture of right femur, initial encounter for closed fracture ([address]    Closed fracture of distal ends of left radius and ulna    Closed bicondylar fracture of distal end of right femur ([address]    Pneumonia due to infectious organism, unspecified laterality, unspecified part of lung    Pneumonia due to organism       Plan:  Discharge        Marcia Jade MD  5:35 AM  2/18/2023

## 2023-02-20 LAB
BLOOD CULTURE, ROUTINE: NORMAL
CULTURE, BLOOD 2: NORMAL
HCT VFR BLD CALC: 35.1 % (ref 34–48)
HEMOGLOBIN: 11.2 G/DL (ref 11.5–15.5)

## 2023-02-23 PROCEDURE — 99305 1ST NF CARE MODERATE MDM 35: CPT | Performed by: FAMILY MEDICINE

## 2023-02-24 ENCOUNTER — HOSPITAL ENCOUNTER (OUTPATIENT)
Dept: MRI IMAGING | Age: 73
Discharge: HOME | End: 2023-02-26

## 2023-02-24 DIAGNOSIS — L89.154 STAGE IV PRESSURE ULCER OF SACRAL REGION (HCC): ICD-10-CM

## 2023-03-03 LAB
HCT VFR BLD CALC: 33.5 % (ref 34–48)
HEMOGLOBIN: 10.5 G/DL (ref 11.5–15.5)

## 2023-03-07 LAB
HCT VFR BLD CALC: 35.9 % (ref 34–48)
HEMOGLOBIN: 11.3 G/DL (ref 11.5–15.5)

## 2023-03-08 LAB
ALBUMIN SERPL-MCNC: 3.2 G/DL (ref 3.5–5.2)
ALP BLD-CCNC: 66 U/L (ref 35–104)
ALT SERPL-CCNC: 5 U/L (ref 0–32)
ANION GAP SERPL CALCULATED.3IONS-SCNC: 13 MMOL/L (ref 7–16)
AST SERPL-CCNC: 13 U/L (ref 0–31)
BILIRUB SERPL-MCNC: 0.3 MG/DL (ref 0–1.2)
BUN BLDV-MCNC: 25 MG/DL (ref 6–23)
CALCIUM SERPL-MCNC: 9.4 MG/DL (ref 8.6–10.2)
CHLORIDE BLD-SCNC: 103 MMOL/L (ref 98–107)
CO2: 29 MMOL/L (ref 22–29)
CREAT SERPL-MCNC: 4.4 MG/DL (ref 0.5–1)
GFR SERPL CREATININE-BSD FRML MDRD: 10 ML/MIN/1.73
GLUCOSE BLD-MCNC: 80 MG/DL (ref 74–99)
HCT VFR BLD CALC: 37 % (ref 34–48)
HEMOGLOBIN: 11.3 G/DL (ref 11.5–15.5)
MCH RBC QN AUTO: 34.5 PG (ref 26–35)
MCHC RBC AUTO-ENTMCNC: 30.5 % (ref 32–34.5)
MCV RBC AUTO: 112.8 FL (ref 80–99.9)
PDW BLD-RTO: 13.9 FL (ref 11.5–15)
PLATELET # BLD: 179 E9/L (ref 130–450)
PMV BLD AUTO: 11 FL (ref 7–12)
POTASSIUM SERPL-SCNC: 3.7 MMOL/L (ref 3.5–5)
RBC # BLD: 3.28 E12/L (ref 3.5–5.5)
SODIUM BLD-SCNC: 145 MMOL/L (ref 132–146)
TOTAL PROTEIN: 6.1 G/DL (ref 6.4–8.3)
WBC # BLD: 7.1 E9/L (ref 4.5–11.5)

## 2023-03-10 LAB
ALBUMIN SERPL-MCNC: 3.4 G/DL (ref 3.5–5.2)
ALP BLD-CCNC: 73 U/L (ref 35–104)
ALT SERPL-CCNC: 7 U/L (ref 0–32)
ANION GAP SERPL CALCULATED.3IONS-SCNC: 11 MMOL/L (ref 7–16)
AST SERPL-CCNC: 14 U/L (ref 0–31)
BILIRUB SERPL-MCNC: 0.3 MG/DL (ref 0–1.2)
BUN BLDV-MCNC: 28 MG/DL (ref 6–23)
C-REACTIVE PROTEIN: 1.3 MG/DL (ref 0–0.4)
CALCIUM SERPL-MCNC: 9.8 MG/DL (ref 8.6–10.2)
CHLORIDE BLD-SCNC: 99 MMOL/L (ref 98–107)
CO2: 30 MMOL/L (ref 22–29)
CREAT SERPL-MCNC: 4.5 MG/DL (ref 0.5–1)
GFR SERPL CREATININE-BSD FRML MDRD: 10 ML/MIN/1.73
GLUCOSE BLD-MCNC: 87 MG/DL (ref 74–99)
HCT VFR BLD CALC: 35.7 % (ref 34–48)
HEMOGLOBIN: 11.6 G/DL (ref 11.5–15.5)
MCH RBC QN AUTO: 35 PG (ref 26–35)
MCHC RBC AUTO-ENTMCNC: 32.5 % (ref 32–34.5)
MCV RBC AUTO: 107.9 FL (ref 80–99.9)
PDW BLD-RTO: 13.8 FL (ref 11.5–15)
PLATELET # BLD: 195 E9/L (ref 130–450)
PMV BLD AUTO: 11 FL (ref 7–12)
POTASSIUM SERPL-SCNC: 4.2 MMOL/L (ref 3.5–5)
RBC # BLD: 3.31 E12/L (ref 3.5–5.5)
SEDIMENTATION RATE, ERYTHROCYTE: 66 MM/HR (ref 0–20)
SODIUM BLD-SCNC: 140 MMOL/L (ref 132–146)
TOTAL PROTEIN: 6.6 G/DL (ref 6.4–8.3)
WBC # BLD: 7.3 E9/L (ref 4.5–11.5)

## 2023-03-13 ENCOUNTER — HOSPITAL ENCOUNTER (OUTPATIENT)
Dept: MRI IMAGING | Age: 73
Discharge: HOME | End: 2023-03-15

## 2023-03-13 LAB
ALBUMIN SERPL-MCNC: 3.5 G/DL (ref 3.5–5.2)
ALP BLD-CCNC: 69 U/L (ref 35–104)
ALT SERPL-CCNC: <5 U/L (ref 0–32)
ANION GAP SERPL CALCULATED.3IONS-SCNC: 12 MMOL/L (ref 7–16)
AST SERPL-CCNC: 12 U/L (ref 0–31)
BILIRUB SERPL-MCNC: 0.4 MG/DL (ref 0–1.2)
BUN BLDV-MCNC: 42 MG/DL (ref 6–23)
C-REACTIVE PROTEIN: 4.2 MG/DL (ref 0–0.4)
CALCIUM SERPL-MCNC: 9.7 MG/DL (ref 8.6–10.2)
CHLORIDE BLD-SCNC: 103 MMOL/L (ref 98–107)
CO2: 28 MMOL/L (ref 22–29)
CREAT SERPL-MCNC: 6.4 MG/DL (ref 0.5–1)
GFR SERPL CREATININE-BSD FRML MDRD: 6 ML/MIN/1.73
GLUCOSE BLD-MCNC: 157 MG/DL (ref 74–99)
HCT VFR BLD CALC: 35.2 % (ref 34–48)
HEMOGLOBIN: 11.1 G/DL (ref 11.5–15.5)
MCH RBC QN AUTO: 35.2 PG (ref 26–35)
MCHC RBC AUTO-ENTMCNC: 31.5 % (ref 32–34.5)
MCV RBC AUTO: 111.7 FL (ref 80–99.9)
PDW BLD-RTO: 14.2 FL (ref 11.5–15)
PLATELET # BLD: 193 E9/L (ref 130–450)
PMV BLD AUTO: 11 FL (ref 7–12)
POTASSIUM SERPL-SCNC: 5.1 MMOL/L (ref 3.5–5)
RBC # BLD: 3.15 E12/L (ref 3.5–5.5)
SEDIMENTATION RATE, ERYTHROCYTE: 60 MM/HR (ref 0–20)
SODIUM BLD-SCNC: 143 MMOL/L (ref 132–146)
TOTAL PROTEIN: 6.9 G/DL (ref 6.4–8.3)
WBC # BLD: 13.6 E9/L (ref 4.5–11.5)

## 2023-03-14 ENCOUNTER — APPOINTMENT (OUTPATIENT)
Dept: GENERAL RADIOLOGY | Age: 73
End: 2023-03-14
Payer: MEDICARE

## 2023-03-14 ENCOUNTER — APPOINTMENT (OUTPATIENT)
Dept: CT IMAGING | Age: 73
End: 2023-03-14
Payer: MEDICARE

## 2023-03-14 ENCOUNTER — HOSPITAL ENCOUNTER (INPATIENT)
Age: 73
LOS: 4 days | Discharge: NURSING, REHAB OR LONG-TERM CARE | End: 2023-03-18
Attending: EMERGENCY MEDICINE | Admitting: FAMILY MEDICINE
Payer: MEDICARE

## 2023-03-14 ENCOUNTER — TELEPHONE (OUTPATIENT)
Dept: OTHER | Facility: CLINIC | Age: 73
End: 2023-03-14

## 2023-03-14 DIAGNOSIS — S91.001A OPEN WOUND OF RIGHT KNEE, LEG, AND ANKLE, INITIAL ENCOUNTER: Primary | ICD-10-CM

## 2023-03-14 DIAGNOSIS — R41.82 ALTERED MENTAL STATUS, UNSPECIFIED ALTERED MENTAL STATUS TYPE: ICD-10-CM

## 2023-03-14 DIAGNOSIS — S81.001A OPEN WOUND OF RIGHT KNEE, LEG, AND ANKLE, INITIAL ENCOUNTER: Primary | ICD-10-CM

## 2023-03-14 DIAGNOSIS — K56.41 FECAL IMPACTION (HCC): ICD-10-CM

## 2023-03-14 DIAGNOSIS — N30.00 ACUTE CYSTITIS WITHOUT HEMATURIA: ICD-10-CM

## 2023-03-14 DIAGNOSIS — L98.429 SKIN ULCER OF SACRUM, UNSPECIFIED ULCER STAGE (HCC): ICD-10-CM

## 2023-03-14 DIAGNOSIS — S81.801A OPEN WOUND OF RIGHT KNEE, LEG, AND ANKLE, INITIAL ENCOUNTER: Primary | ICD-10-CM

## 2023-03-14 PROBLEM — L08.9 WOUND INFECTION: Status: ACTIVE | Noted: 2023-03-14

## 2023-03-14 PROBLEM — T14.8XXA WOUND INFECTION: Status: ACTIVE | Noted: 2023-03-14

## 2023-03-14 PROBLEM — A41.9 SEPSIS (HCC): Status: ACTIVE | Noted: 2023-03-14

## 2023-03-14 LAB
ALBUMIN SERPL-MCNC: 3.4 G/DL (ref 3.5–5.2)
ALP BLD-CCNC: 63 U/L (ref 35–104)
ALT SERPL-CCNC: 8 U/L (ref 0–32)
AMMONIA: 11 UMOL/L (ref 11–51)
ANION GAP SERPL CALCULATED.3IONS-SCNC: 13 MMOL/L (ref 7–16)
AST SERPL-CCNC: 14 U/L (ref 0–31)
BACTERIA: ABNORMAL /HPF
BASOPHILS ABSOLUTE: 0.04 E9/L (ref 0–0.2)
BASOPHILS RELATIVE PERCENT: 0.3 % (ref 0–2)
BILIRUB SERPL-MCNC: 0.5 MG/DL (ref 0–1.2)
BILIRUBIN URINE: NEGATIVE
BLOOD, URINE: NEGATIVE
BUN BLDV-MCNC: 19 MG/DL (ref 6–23)
CALCIUM SERPL-MCNC: 9.7 MG/DL (ref 8.6–10.2)
CHLORIDE BLD-SCNC: 94 MMOL/L (ref 98–107)
CLARITY: CLEAR
CO2: 27 MMOL/L (ref 22–29)
COLOR: YELLOW
CREAT SERPL-MCNC: 3.4 MG/DL (ref 0.5–1)
EKG ATRIAL RATE: 91 BPM
EKG Q-T INTERVAL: 478 MS
EKG QRS DURATION: 146 MS
EKG QTC CALCULATION (BAZETT): 591 MS
EKG R AXIS: -8 DEGREES
EKG T AXIS: 120 DEGREES
EKG VENTRICULAR RATE: 92 BPM
EOSINOPHILS ABSOLUTE: 0.15 E9/L (ref 0.05–0.5)
EOSINOPHILS RELATIVE PERCENT: 1.2 % (ref 0–6)
EPITHELIAL CELLS, UA: ABNORMAL /HPF
GFR SERPL CREATININE-BSD FRML MDRD: 14 ML/MIN/1.73
GLUCOSE BLD-MCNC: 120 MG/DL (ref 74–99)
GLUCOSE URINE: 100 MG/DL
HCT VFR BLD CALC: 32.1 % (ref 34–48)
HEMOGLOBIN: 10.1 G/DL (ref 11.5–15.5)
IMMATURE GRANULOCYTES #: 0.07 E9/L
IMMATURE GRANULOCYTES %: 0.6 % (ref 0–5)
KETONES, URINE: NEGATIVE MG/DL
LACTIC ACID: 1 MMOL/L (ref 0.5–2.2)
LACTIC ACID: 2.7 MMOL/L (ref 0.5–2.2)
LEUKOCYTE ESTERASE, URINE: ABNORMAL
LYMPHOCYTES ABSOLUTE: 0.78 E9/L (ref 1.5–4)
LYMPHOCYTES RELATIVE PERCENT: 6.1 % (ref 20–42)
MCH RBC QN AUTO: 34.4 PG (ref 26–35)
MCHC RBC AUTO-ENTMCNC: 31.5 % (ref 32–34.5)
MCV RBC AUTO: 109.2 FL (ref 80–99.9)
MONOCYTES ABSOLUTE: 1.12 E9/L (ref 0.1–0.95)
MONOCYTES RELATIVE PERCENT: 8.8 % (ref 2–12)
NEUTROPHILS ABSOLUTE: 10.56 E9/L (ref 1.8–7.3)
NEUTROPHILS RELATIVE PERCENT: 83 % (ref 43–80)
NITRITE, URINE: NEGATIVE
PDW BLD-RTO: 13.9 FL (ref 11.5–15)
PH UA: 8 (ref 5–9)
PLATELET # BLD: 172 E9/L (ref 130–450)
PMV BLD AUTO: 11 FL (ref 7–12)
POTASSIUM REFLEX MAGNESIUM: 3.9 MMOL/L (ref 3.5–5)
PROCALCITONIN: 0.42 NG/ML (ref 0–0.08)
PROTEIN UA: 100 MG/DL
RBC # BLD: 2.94 E12/L (ref 3.5–5.5)
RBC UA: ABNORMAL /HPF (ref 0–2)
SODIUM BLD-SCNC: 134 MMOL/L (ref 132–146)
SPECIFIC GRAVITY UA: 1.01 (ref 1–1.03)
TOTAL PROTEIN: 6.6 G/DL (ref 6.4–8.3)
TROPONIN, HIGH SENSITIVITY: 111 NG/L (ref 0–9)
TROPONIN, HIGH SENSITIVITY: 125 NG/L (ref 0–9)
UROBILINOGEN, URINE: 0.2 E.U./DL
WBC # BLD: 12.7 E9/L (ref 4.5–11.5)
WBC UA: ABNORMAL /HPF (ref 0–5)

## 2023-03-14 PROCEDURE — 96365 THER/PROPH/DIAG IV INF INIT: CPT

## 2023-03-14 PROCEDURE — 2580000003 HC RX 258: Performed by: RADIOLOGY

## 2023-03-14 PROCEDURE — 93005 ELECTROCARDIOGRAM TRACING: CPT | Performed by: STUDENT IN AN ORGANIZED HEALTH CARE EDUCATION/TRAINING PROGRAM

## 2023-03-14 PROCEDURE — 87077 CULTURE AEROBIC IDENTIFY: CPT

## 2023-03-14 PROCEDURE — 73700 CT LOWER EXTREMITY W/O DYE: CPT

## 2023-03-14 PROCEDURE — 87088 URINE BACTERIA CULTURE: CPT

## 2023-03-14 PROCEDURE — 85025 COMPLETE CBC W/AUTO DIFF WBC: CPT

## 2023-03-14 PROCEDURE — 87070 CULTURE OTHR SPECIMN AEROBIC: CPT

## 2023-03-14 PROCEDURE — 81001 URINALYSIS AUTO W/SCOPE: CPT

## 2023-03-14 PROCEDURE — 36415 COLL VENOUS BLD VENIPUNCTURE: CPT

## 2023-03-14 PROCEDURE — 6360000002 HC RX W HCPCS: Performed by: STUDENT IN AN ORGANIZED HEALTH CARE EDUCATION/TRAINING PROGRAM

## 2023-03-14 PROCEDURE — 82140 ASSAY OF AMMONIA: CPT

## 2023-03-14 PROCEDURE — 80053 COMPREHEN METABOLIC PANEL: CPT

## 2023-03-14 PROCEDURE — 87040 BLOOD CULTURE FOR BACTERIA: CPT

## 2023-03-14 PROCEDURE — 2060000000 HC ICU INTERMEDIATE R&B

## 2023-03-14 PROCEDURE — 2580000003 HC RX 258: Performed by: STUDENT IN AN ORGANIZED HEALTH CARE EDUCATION/TRAINING PROGRAM

## 2023-03-14 PROCEDURE — 73562 X-RAY EXAM OF KNEE 3: CPT

## 2023-03-14 PROCEDURE — 84145 PROCALCITONIN (PCT): CPT

## 2023-03-14 PROCEDURE — 93010 ELECTROCARDIOGRAM REPORT: CPT | Performed by: INTERNAL MEDICINE

## 2023-03-14 PROCEDURE — 70450 CT HEAD/BRAIN W/O DYE: CPT

## 2023-03-14 PROCEDURE — 87075 CULTR BACTERIA EXCEPT BLOOD: CPT

## 2023-03-14 PROCEDURE — 74177 CT ABD & PELVIS W/CONTRAST: CPT

## 2023-03-14 PROCEDURE — 84484 ASSAY OF TROPONIN QUANT: CPT

## 2023-03-14 PROCEDURE — 6360000004 HC RX CONTRAST MEDICATION: Performed by: RADIOLOGY

## 2023-03-14 PROCEDURE — 87186 SC STD MICRODIL/AGAR DIL: CPT

## 2023-03-14 PROCEDURE — 71045 X-RAY EXAM CHEST 1 VIEW: CPT

## 2023-03-14 PROCEDURE — 99285 EMERGENCY DEPT VISIT HI MDM: CPT

## 2023-03-14 PROCEDURE — 83605 ASSAY OF LACTIC ACID: CPT

## 2023-03-14 PROCEDURE — 87150 DNA/RNA AMPLIFIED PROBE: CPT

## 2023-03-14 RX ORDER — SODIUM CHLORIDE 0.9 % (FLUSH) 0.9 %
10 SYRINGE (ML) INJECTION PRN
Status: DISCONTINUED | OUTPATIENT
Start: 2023-03-14 | End: 2023-03-18 | Stop reason: HOSPADM

## 2023-03-14 RX ORDER — 0.9 % SODIUM CHLORIDE 0.9 %
1000 INTRAVENOUS SOLUTION INTRAVENOUS ONCE
Status: COMPLETED | OUTPATIENT
Start: 2023-03-14 | End: 2023-03-14

## 2023-03-14 RX ADMIN — PIPERACILLIN AND TAZOBACTAM 4500 MG: 4; .5 INJECTION, POWDER, FOR SOLUTION INTRAVENOUS at 16:37

## 2023-03-14 RX ADMIN — IOPAMIDOL 75 ML: 755 INJECTION, SOLUTION INTRAVENOUS at 15:53

## 2023-03-14 RX ADMIN — Medication 10 ML: at 15:53

## 2023-03-14 RX ADMIN — SODIUM CHLORIDE 1000 ML: 9 INJECTION, SOLUTION INTRAVENOUS at 20:13

## 2023-03-14 RX ADMIN — VANCOMYCIN HYDROCHLORIDE 1750 MG: 10 INJECTION, POWDER, LYOPHILIZED, FOR SOLUTION INTRAVENOUS at 17:18

## 2023-03-14 NOTE — LETTER
[address] [facility] Encounter Date/Time: 3/14/2023 Humza Bell Account: [de-identified]    MRN: [id number]    Patient: Bismark Garay    Contact Serial #: [id number]      ENCOUNTER          Patient Class: I Private Enc? No Unit KELVIN Juarez [address] Service: MED   Encounter DX: Wound infection [T14. 3ML*   ADM Provider: Ramon Moran MD   Procedure:     ATT Provider: Ramon Moran MD   REF Provider:        Admission DX: Wound infection, Sepsis ([address] Fecal impaction ([address] Open wound of right knee, leg, and ankle, initial encounter and DX codes: T14. Heena Sportsman, L08.9, A41.9, K56.41, S81.001A, S81.801A, S91.001A      PATIENT                 Name: Bismark Garay : 1950 (72 yrs)   Address: [facility] Sex: Female   [place]: [place] [address]         Marital Status:    Employer: RETIRED         Quaker: [employer]   Primary Care Provider: Ramon Moran MD         Primary Phone: [phone]   EMERGENCY CONTACT   Contact Name Legal Guardian? Relationship to Patient Home Phone Work Phone   1. Johnson Mcguire  2. Inga Mcguire  No Child  Child [phone]              GUARANTOR            Guarantor: Bismark Garay     : 1950   Address: [address] [place] [address] Sex: Female   [place] [address]     Relation to Patient: Self       Home Phone: [phone]   Guarantor ID: [id number]       Work Phone:     Guarantor Employer: RETIRED         Status: RETIRED      COVERAGE        PRIMARY INSURANCE   Payor: [facility] MEDICARE Plan: Bret FALCON*   Payor Address: ,          Group Number: [id number] Insurance Type: INDEMNITY   Subscriber Name: Paula Norman : 1950   Subscriber ID: [id number] Pat. Rel. to Sub: Self   SECONDARY INSURANCE   Payor: [place] [employer] Medicaid  CERTIFICATION OF NECESSITY  FOR NON-EMERGENCY TRANSPORTATION   BY GROUND AMBULANCE      Individual Information   1. Name: Bismark Garay 2. [place] Medicaid Billing Number:    3.  Address: [address]  [facility]      Transportation Provider Information   4. Provider Name:    5. [place] Medicaid Provider Number:  National Provider Identifier (NPI):      Certification  7. Criteria:  During transport, this individual requires:  [x] Medical treatment or continuous     supervision by an EMT. [] The administration or regulation of oxygen by another person. [] Supervised protective restraint. 8. Period Beginning Date:    5. Length  [x] Not more than 10 day(s)  [] One Year     Additional Information Relevant to Certification   10. Comments or Explanations, If Necessary or Appropriate     Fall risk, bed bound, roger lift     Certifying Practitioner Information   11. Name of Practitioner:    12. [place] Medicaid Provider Number, If Applicable:  [address] Provider Identifier (NPI):      Signature Information   14. Date of Signature:  13. Name of Person Signing: Zheng WOMACK   16. Signature and Professional Designation: Electronically signed by NISHA Painter, SHANTA on 3/16/2023 at 12:36 PM       ODM 407[address]  Rev. 2015 Arnoldo PERERA Plan: Joao*   Payor Address:  [address], [address], [address] [place] [address]          Group Number: [facility] Type: INDEMNITY   Subscriber Name: Alexis Kapadiaer : 1950   Subscriber ID: [id number] Pat.  Rel. to Sub: SELF      CSN: [id number]

## 2023-03-14 NOTE — ED PROVIDER NOTES
HPI  67 y.o. female presenting for altered mental status. Per report, patient baseline. However, she has recently had increasing confusion. On exam patient is alert and oriented to person and place only. History and ROS is limited due to altered mental status.      --------------------------------------------- PAST HISTORY ---------------------------------------------  Past Medical History:  has a past medical history of Anemia in chronic kidney disease, Anxiety and depression, Arthritis, Chronic pain syndrome, COVID-19, COVID-19, Decreased dorsalis pedis pulse, Diabetes mellitus ([address] Dysphagia, oral phase, ESRD on hemodialysis ([address] GERD (gastroesophageal reflux disease), Hemodialysis patient ([address] Hyperkalemia, Hypertension, Hypertensive kidney disease with stage 4 chronic kidney disease ([address] Insomnia, Kidney stones, MDD (major depressive disorder), Moderate protein-calorie malnutrition ([address] Morbid obesity ([address] Muscle weakness (generalized), Obesity, Pressure injury of sacral region, stage 3 ([address] Pressure ulcer of sacral region, Sacral pressure ulcer, Unspecified osteoarthritis, unspecified site, and Weakness generalized. Past Surgical History:  has a past surgical history that includes Foot surgery ( ); Cystocopy (2011 );  section (x3); Upper gastrointestinal endoscopy (2.18.15); Cholecystectomy (3/31/16); Abdomen surgery (N/A, 2020); Upper gastrointestinal endoscopy (N/A, 2020); Upper gastrointestinal endoscopy (N/A, 2020); vascular surgery (N/A, 2021); Dialysis fistula creation (Left, 2021); Dialysis fistula creation (Right, 2021); Femur fracture surgery (Right, 2022); and Wrist fracture surgery (Left, 2022). Social History:  reports that she quit smoking about 11 years ago. Her smoking use included cigarettes. She has a 30.00 pack-year smoking history.  She has never used smokeless tobacco. She reports that she does not drink alcohol and does not use drugs. Family History: family history includes Cancer in her sister. The patients home medications have been reviewed. Allergies: Patient has no known allergies. Review of Systems   Unable to perform ROS: Mental status change      Physical Exam  Constitutional:       General: She is not in acute distress. Appearance: She is not ill-appearing. HENT:      Head: Normocephalic and atraumatic. Cardiovascular:      Rate and Rhythm: Normal rate and regular rhythm. Comments: DP pulse right lower extremity intact  Pulmonary:      Effort: Pulmonary effort is normal.      Breath sounds: Normal breath sounds. Abdominal:      General: There is no distension. Palpations: Abdomen is soft. Musculoskeletal:      Comments: Wound vac right hip, sacrum   Skin:     General: Skin is warm and dry. Comments: Wound right lateral knee, purulent drainage present   Neurological:      Mental Status: She is alert. Comments: Alert and oriented to person and place only   Psychiatric:         Mood and Affect: Mood normal.                    Procedures      ED Course as of 03/15/23 0152   Tue Mar 14, 2023   1226 Patient is a 78-year-old female presenting from [facility] due to altered mental status. She has a history of end-stage renal disease on hemodialysis, last completed hemodialysis today. She is a poor historian so it was difficult to obtain a complete history. History was obtained from nursing home. I reviewed nursing home paperwork which shows that the patient is DNR CCA. She had recent blood work showing a creatinine of 6.4 (previously 4.50), hyperkalemia 5.1, WBC of 10.6, and hemoglobin of 11.1. According to nursing home records the patient has possible sacral osteomyelitis but has been refusing an MRI. She has a wound VAC in place. In the emergency department, the patient was hemodynamically stable and afebrile. She was confused but alert.   She had no focal motor or sensory deficits. She has a fistula to her left upper extremity with a palpable thrill. She has no abdominal tenderness. She has an open wound which is draining purulent fluid lateral to her right knee. She has a wound to her right hip and right buttocks as well with a wound VAC in place. [JA]   1238 EKG: This EKG is signed and interpreted by me, Corrinne Dome, MD.    Rate: 92  Rhythm: Wide complex rhythm  Interpretation: Wide complex rhythm, left bundle branch block, QTc prolonged at 591, no acute ST or T wave changes  Comparison: No significant change when compared to prior EKG   [JA]   1250 I reviewed the patient's chart. Patient was most recently admitted on 2/15/2023 for altered mental status. She had a reassuring work-up at that time. Apparently she has a history of similar episodes in the past. [JA]   1913 Offered fecal disimpaction to patient, however, she declined.  She said she had it done before and did not like it. [AP]      ED Course User Index  [AP] Padmini Mir MD  [JA] Corrinne Dome, MD       -------------------------------------------------- RESULTS -------------------------------------------------    LABS:  Results for orders placed or performed during the hospital encounter of 03/14/23   CBC with Auto Differential   Result Value Ref Range    WBC 12.7 (H) 4.5 - 11.5 E9/L    RBC 2.94 (L) 3.50 - 5.50 E12/L    Hemoglobin 10.1 (L) 11.5 - 15.5 g/dL    Hematocrit 32.1 (L) 34.0 - 48.0 %    .2 (H) 80.0 - 99.9 fL    MCH 34.4 26.0 - 35.0 pg    MCHC 31.5 (L) 32.0 - 34.5 %    RDW 13.9 11.5 - 15.0 fL    Platelets 114 686 - 936 E9/L    MPV 11.0 7.0 - 12.0 fL    Neutrophils % 83.0 (H) 43.0 - 80.0 %    Immature Granulocytes % 0.6 0.0 - 5.0 %    Lymphocytes % 6.1 (L) 20.0 - 42.0 %    Monocytes % 8.8 2.0 - 12.0 %    Eosinophils % 1.2 0.0 - 6.0 %    Basophils % 0.3 0.0 - 2.0 %    Neutrophils Absolute 10.56 (H) 1.80 - 7.30 E9/L    Immature Granulocytes # 0.07 E9/L    Lymphocytes Absolute 0.78 (L) 1.50 - 4.00 E9/L    Monocytes Absolute 1.12 (H) 0.10 - 0.95 E9/L    Eosinophils Absolute 0.15 0.05 - 0.50 E9/L    Basophils Absolute 0.04 0.00 - 0.20 E9/L   Comprehensive Metabolic Panel w/ Reflex to MG   Result Value Ref Range    Sodium 134 132 - 146 mmol/L    Potassium reflex Magnesium 3.9 3.5 - 5.0 mmol/L    Chloride 94 (L) 98 - 107 mmol/L    CO2 27 22 - 29 mmol/L    Anion Gap 13 7 - 16 mmol/L    Glucose 120 (H) 74 - 99 mg/dL    BUN 19 6 - 23 mg/dL    Creatinine 3.4 (H) 0.5 - 1.0 mg/dL    Est, Glom Filt Rate 14 >=60 mL/min/1.73    Calcium 9.7 8.6 - 10.2 mg/dL    Total Protein 6.6 6.4 - 8.3 g/dL    Albumin 3.4 (L) 3.5 - 5.2 g/dL    Total Bilirubin 0.5 0.0 - 1.2 mg/dL    Alkaline Phosphatase 63 35 - 104 U/L    ALT 8 0 - 32 U/L    AST 14 0 - 31 U/L   Lactic Acid   Result Value Ref Range    Lactic Acid 2.7 (H) 0.5 - 2.2 mmol/L   Troponin   Result Value Ref Range    Troponin, High Sensitivity 111 (H) 0 - 9 ng/L   Urinalysis with Microscopic   Result Value Ref Range    Color, UA Yellow Straw/Yellow    Clarity, UA Clear Clear    Glucose, Ur 100 (A) Negative mg/dL    Bilirubin Urine Negative Negative    Ketones, Urine Negative Negative mg/dL    Specific Gravity, UA 1.015 1.005 - 1.030    Blood, Urine Negative Negative    pH, UA 8.0 5.0 - 9.0    Protein,  (A) Negative mg/dL    Urobilinogen, Urine 0.2 <2.0 E.U./dL    Nitrite, Urine Negative Negative    Leukocyte Esterase, Urine TRACE (A) Negative    WBC, UA 5-10 (A) 0 - 5 /HPF    RBC, UA 1-3 0 - 2 /HPF    Epithelial Cells, UA FEW /HPF    Bacteria, UA FEW (A) None Seen /HPF   Procalcitonin   Result Value Ref Range    Procalcitonin 0.42 (H) 0.00 - 0.08 ng/mL   Ammonia   Result Value Ref Range    Ammonia 11.0 11.0 - 51.0 umol/L   Troponin   Result Value Ref Range    Troponin, High Sensitivity 125 (H) 0 - 9 ng/L   Lactic Acid   Result Value Ref Range    Lactic Acid 1.0 0.5 - 2.2 mmol/L   EKG 12 Lead   Result Value Ref Range    Ventricular Rate 92 BPM    Atrial Rate 91 BPM    QRS Duration 146 ms    Q-T Interval 478 ms    QTc Calculation (Bazett) 591 ms    R Axis -8 degrees    T Axis 120 degrees       RADIOLOGY:  CT KNEE RIGHT WO CONTRAST   Final Result   1. No acute disease. 2. Chronic fracture distal femoral metadiaphysis with intact traversing   intramedullary and lateral screw plate ORIF hardware. Incomplete osseous   bridging noted. 3. Cutaneous scarring or mild ulceration over the lateral supra geniculate   knee soft tissues, likely the result of scarring from lateral screw plate   fixation. The region of ulceration measures 2.4 x 1.4 cm. No significant   tracking subcutaneous emphysema. RECOMMENDATIONS:   Careful clinical correlation and follow up recommended. CT HEAD WO CONTRAST   Final Result   No acute intracranial abnormality. CT ABDOMEN PELVIS W IV CONTRAST Additional Contrast? None   Final Result   Interval placement of high density material within the ulcer bed. Otherwise,   no significant change in the sacral decubitus ulcer with stable appearance of   the underlying bone. No evidence of abscess. Distended rectum containing hardened stool and mild wall thickening   suggestive of fecal impaction. No evidence of bowel obstruction. Interval decrease in pleural effusions with trace right and small left   remaining. XR CHEST PORTABLE   Final Result   Vague patchy airspace disease seen within the right upper lobe and right   lower lobe improved when compared with the prior CT scan of 02/16/2023      Marked cardiomegaly      Possible small left pleural effusion         XR KNEE RIGHT (3 VIEWS)   Final Result   No acute bony abnormalities or hardware complications.       Soft tissue swelling and questionable soft tissue gas along the lateral   aspect.               ------------------------- NURSING NOTES AND VITALS REVIEWED ---------------------------  Date / Time Roomed:  3/14/2023 12:04 PM  ED Bed Assignment: 21/21    The nursing notes within the ED encounter and vital signs as below have been reviewed. Patient Vitals for the past 24 hrs:   BP Temp Temp src Pulse Resp SpO2 Height Weight   03/15/23 0000 -- -- -- 94 17 -- -- --   03/14/23 2041 -- -- -- (!) 104 15 -- -- --   03/14/23 2015 -- 98.2 °F (36.8 °C) Oral -- -- -- -- --   03/14/23 2000 (!) 189/92 -- -- -- 17 -- -- --   03/14/23 1845 -- -- -- -- -- 93 % -- --   03/14/23 1830 -- -- -- -- -- 92 % -- --   03/14/23 1815 -- -- -- 96 19 94 % -- --   03/14/23 1215 138/74 -- -- 90 19 95 % -- --   03/14/23 1159 (!) 140/74 98.2 °F (36.8 °C) -- 98 20 98 % 5' 1\" (1.549 m) 195 lb (88.5 kg)       Oxygen Saturation Interpretation: Normal      Medical Decision Making  67-year-old female sent from nursing facility for altered mental status. Patient alert and oriented to person and place only, unknown what her baseline is. Wound noted to right lateral leg with purulent discharge, cultures obtained. Labs showed leukocytosis. Creatinine  troponin elevated, however patient is on dialysis. No STEMI on EKG, so doubt ACS at this time. Possible UTI on UA, culture sent. CT head unremarkable. No pneumonia on CXR. CT abdomen/pelvis showed stable sacral wound, as well as fecal impaction. Patient does meet sepsis criteria, likely secondary to wound infection. She was given dose of vancomycin and zosyn IV. Orthopedic surgery consulted due to vicinity of wound to distal femur hardware. Case was discussed with Dr. Shahab Prince, who recommended fecal disimpaction of patient and accepted patient for admission. I did attempt fecal disimpaction, however, patient declined, stating she had had that before and did not want to go through it again. Patient admitted in stable condition. Amount and/or Complexity of Data Reviewed  Labs: ordered.      Details: leukocytosis  procal 0.42  electrolytes wnl  elevated creatinine- patient is on dialysis  UA- possible UTI  Radiology: ordered and independent interpretation performed. Details: CT abdomen/pelvis- sacral ulcer to bone; retained stool in rectum  CXR- cardiomegaly; no pneumothorax  CT head- no acute intracranial hemorrhage  XR right knee- hardware distal femur; soft tissue swelling noted to lateral knee  ECG/medicine tests: ordered and independent interpretation performed. Decision-making details documented in ED Course. Risk  Prescription drug management. Decision regarding hospitalization. Is this patient to be included in the SEP-1 core measure due to severe sepsis or septic shock? Yes SEP-1 CORE MEASURE DATA      Sepsis Criteria   Severe Sepsis Criteria   Septic Shock Criteria       Must meet 2:    []Temp >100.9 F (38.3 C) or < 96.8 F (36 C)  [x]HR > 90  []RR > 20  [x]WBC > 12 or < 4 or 10% bands    AND:    [x] Infection Confirmed or Suspected. Must meet 1:    [x]Lactate > 2       or   []Signs of Organ Dysfunction:    - SBP < 90 or MAP < 65  -Creatinine > 2 or increased from baseline  -Urine Output < 0.5 ml/kg/hr  -Bilirubin > 2  -INR > 1.5 (not anticoagulated)  -Platelets < 801,355  -Acute Respiratory Failure as evidenced by new need for NIPPV or mechanical ventilation   Must meet 1:    []Lactate > 4        or   []SBP < 90 or MAP < 65 for at least two readings in the first hour after fluid bolus administration    []Vasopressors initiated (if hypotension persists after fluid resuscitation)   Patient Vitals for the past 6 hrs:   Pulse Resp SpO2   03/14/23 1815 96 19 94 %   03/14/23 1830 -- -- 92 %   03/14/23 1845 -- -- 93 %      Recent Labs     03/13/23  1100 03/14/23  1225   WBC 13.6* 12.7*   LACTA  --  2.7*   CREATININE 6.4* 3.4*   BILITOT 0.4 0.5    172        Severe sepsis identified date: 3/14/23 time: 1521    Fluid Resuscitation Rationale: less than 30mL/kg because of a history of ESRD or stage IV/V CKD. Instead, 1L was ordered.   More fluid initially would be potentially detrimental to the patient    Repeat lactate level: ordered and pending at this time    Reassessment Exam: Not applicable. Patient does not have septic shock. Counseling:  I have spoken with the patient and discussed todays results, in addition to providing specific details for the plan of care and counseling regarding the diagnosis and prognosis. Their questions are answered at this time and they are agreeable with the plan of admission.    --------------------------------- ADDITIONAL PROVIDER NOTES ---------------------------------    This patient's ED course included: a personal history and physicial examination, re-evaluation prior to disposition, multiple bedside re-evaluations, IV medications, cardiac monitoring, and continuous pulse oximetry    This patient has remained hemodynamically stable during their ED course. Diagnosis:  1. Open wound of right knee, leg, and ankle, initial encounter    2. Fecal impaction ([facility] [address]        Disposition:  Patient's disposition: Admit to med/surg floor  Patient's condition is stable.          Isac Sacks, MD  Resident  03/15/23 [id number]

## 2023-03-14 NOTE — ED NOTES
Demi contacted this RN and asked that the pts wound vac stay in the ED. They are sending someone to collect it.       Rolf Connolly RN  03/14/23 [id number]

## 2023-03-14 NOTE — ED NOTES
Radiology Procedure Waiver   Name: Bela Caceres  : 1950  MRN: [id number]    Date:  3/14/23    Time: 2:03 PM EDT    Benefits of immediately proceeding with Radiology exam(s) without pre-testing outweigh the risks or are not indicated as specified below and therefore the following is/are being waived:    [] Pregnancy test   [] Patients LMP on-time and regular.   [] Patient had Tubal Ligation or has other Contraception Device. [] Patient  is Menopausal or Premenarcheal.    [] Patient had Full or Partial Hysterectomy. [] Protocol for Iodine allergy    [] MRI Questionnaire     [x] BUN/Creatinine   [] Patient age w/no hx of renal dysfunction. [] Patient on Dialysis. [] Recent Normal Labs.   Electronically signed by Omayra Conley MD on 3/14/23 at 2:03 PM EDT               Omayra Conley MD  Resident  [phone]

## 2023-03-14 NOTE — ED NOTES
Wound vac removed from coccyx wound and given to Demi. Coccyx wound packed and dressed.       Laura Devine RN  03/14/23 2903

## 2023-03-14 NOTE — TELEPHONE ENCOUNTER
Writer contacted ED provider to inform of 30 day readmission risk. Writer's attempt to contact ED provider was unsuccessful.      Call Back: If you need to call back to inform of disposition you can contact me at [phone]     Attending physician: Dr. Rod Vicente

## 2023-03-15 LAB
A BAUMANNII DNA BLD POS QL NAA+NON-PROBE: NOT DETECTED
ALBUMIN SERPL-MCNC: 3.1 G/DL (ref 3.5–5.2)
ALP SERPL-CCNC: 58 U/L (ref 35–104)
ALT SERPL-CCNC: 6 U/L (ref 0–32)
ANION GAP SERPL CALCULATED.3IONS-SCNC: 11 MMOL/L (ref 7–16)
ANISOCYTOSIS: ABNORMAL
AST SERPL-CCNC: 11 U/L (ref 0–31)
BASOPHILS # BLD: 0.06 E9/L (ref 0–0.2)
BASOPHILS NFR BLD: 0.9 % (ref 0–2)
BILIRUB SERPL-MCNC: 0.4 MG/DL (ref 0–1.2)
BOTTLE TYPE: ABNORMAL
BUN SERPL-MCNC: 24 MG/DL (ref 6–23)
C ALBICANS DNA BLD POS QL NAA+NON-PROBE: NOT DETECTED
C AURIS DNA BLD POS QL NAA+PROBE: NOT DETECTED
C GLABRATA DNA BLD POS QL NAA+NON-PROBE: NOT DETECTED
C KRUSEI DNA BLD POS QL NAA+NON-PROBE: NOT DETECTED
C PARAP DNA BLD POS QL NAA+NON-PROBE: NOT DETECTED
C TROPICLS DNA BLD POS QL NAA+NON-PROBE: NOT DETECTED
CALCIUM SERPL-MCNC: 9.1 MG/DL (ref 8.6–10.2)
CHLORIDE SERPL-SCNC: 97 MMOL/L (ref 98–107)
CO2 SERPL-SCNC: 27 MMOL/L (ref 22–29)
CREAT SERPL-MCNC: 4.5 MG/DL (ref 0.5–1)
CRYPTOCOCCUS NEOFORMANS/GATTII BY PCR: NOT DETECTED
E CLOAC COMP DNA BLD POS NAA+NON-PROBE: NOT DETECTED
E COLI DNA BLD POS QL NAA+NON-PROBE: NOT DETECTED
E FAECALIS DNA BLD POS QL NAA+PROBE: DETECTED
E FAECIUM DNA BLD POS QL NAA+PROBE: NOT DETECTED
ENTEROBACT DNA BLD POS QL NAA+NON-PROBE: NOT DETECTED
ENTEROCOC DNA BLD POS QL NAA+NON-PROBE: NOT DETECTED
EOSINOPHIL # BLD: 0.31 E9/L (ref 0.05–0.5)
EOSINOPHIL NFR BLD: 4.3 % (ref 0–6)
ERYTHROCYTE [DISTWIDTH] IN BLOOD BY AUTOMATED COUNT: 13.6 FL (ref 11.5–15)
GLUCOSE SERPL-MCNC: 88 MG/DL (ref 74–99)
GN BLD CULTURE PNL BLD POS NAA+PROBE: NOT DETECTED
HCT VFR BLD AUTO: 31.5 % (ref 34–48)
HGB BLD-MCNC: 9.8 G/DL (ref 11.5–15.5)
K OXYTOCA DNA BLD POS QL NAA+NON-PROBE: NOT DETECTED
K PNEUMON DNA SPEC QL NAA+PROBE: NOT DETECTED
K. AEROGENES DNA SPEC QL NAA+PROBE: NOT DETECTED
L MONOCYTOG DNA BLD POS QL NAA+NON-PROBE: NOT DETECTED
LYMPHOCYTES # BLD: 0.71 E9/L (ref 1.5–4)
LYMPHOCYTES NFR BLD: 10.4 % (ref 20–42)
MCH RBC QN AUTO: 34.3 PG (ref 26–35)
MCHC RBC AUTO-ENTMCNC: 31.1 % (ref 32–34.5)
MCV RBC AUTO: 110.1 FL (ref 80–99.9)
MECA BLD POS QL NAA+NON-PROBE: DETECTED
METAMYELOCYTES NFR BLD MANUAL: 0.9 % (ref 0–1)
METER GLUCOSE: 110 MG/DL (ref 74–99)
METER GLUCOSE: 74 MG/DL (ref 74–99)
MONOCYTES # BLD: 0.71 E9/L (ref 0.1–0.95)
MONOCYTES NFR BLD: 9.6 % (ref 2–12)
N MEN DNA BLD POS QL NAA+NON-PROBE: NOT DETECTED
NEUTROPHILS # BLD: 5.33 E9/L (ref 1.8–7.3)
NEUTS SEG NFR BLD: 73.9 % (ref 43–80)
ORDER NUMBER: ABNORMAL
P AERUGINOSA DNA BLD POS NAA+NON-PROBE: NOT DETECTED
PLATELET # BLD AUTO: 163 E9/L (ref 130–450)
PMV BLD AUTO: 10.1 FL (ref 7–12)
POTASSIUM SERPL-SCNC: 4 MMOL/L (ref 3.5–5)
PROT SERPL-MCNC: 6.3 G/DL (ref 6.4–8.3)
PROTEUS SP DNA BLD POS QL NAA+NON-PROBE: NOT DETECTED
RBC # BLD AUTO: 2.86 E12/L (ref 3.5–5.5)
S AUREUS DNA BLD POS QL NAA+NON-PROBE: NOT DETECTED
S AUREUS+CONS DNA BLD POS NAA+NON-PROBE: DETECTED
S EPIDERMIDIS DNA BLD POS QL NAA+PROBE: DETECTED
S LUGDUNENSIS DNA BLD POS QL NAA+PROBE: NOT DETECTED
S MALTOPH DNA BLD POS QL NAA+PROBE: NOT DETECTED
S MARCESCENS DNA BLD POS NAA+NON-PROBE: NOT DETECTED
S PNEUM DNA BLD POS QL NAA+NON-PROBE: NOT DETECTED
S PYO DNA BLD POS QL NAA+NON-PROBE: NOT DETECTED
SALMONELLA DNA BLD POS QL NAA+PROBE: NOT DETECTED
SODIUM SERPL-SCNC: 135 MMOL/L (ref 132–146)
SOURCE OF BLOOD CULTURE: ABNORMAL
STREPTOCOCCUS AGALACTIAE BY PCR: NOT DETECTED
STREPTOCOCCUS DNA BLD POS NAA+NON-PROBE: NOT DETECTED
VANA+VANB ISLT/SPM QL: NOT DETECTED
WBC # BLD: 7.1 E9/L (ref 4.5–11.5)

## 2023-03-15 PROCEDURE — 6370000000 HC RX 637 (ALT 250 FOR IP): Performed by: FAMILY MEDICINE

## 2023-03-15 PROCEDURE — 90935 HEMODIALYSIS ONE EVALUATION: CPT

## 2023-03-15 PROCEDURE — 5A1D70Z PERFORMANCE OF URINARY FILTRATION, INTERMITTENT, LESS THAN 6 HOURS PER DAY: ICD-10-PCS | Performed by: INTERNAL MEDICINE

## 2023-03-15 PROCEDURE — S5553 INSULIN LONG ACTING 5 U: HCPCS | Performed by: FAMILY MEDICINE

## 2023-03-15 PROCEDURE — 82962 GLUCOSE BLOOD TEST: CPT

## 2023-03-15 PROCEDURE — 2580000003 HC RX 258: Performed by: INTERNAL MEDICINE

## 2023-03-15 PROCEDURE — 2060000000 HC ICU INTERMEDIATE R&B

## 2023-03-15 PROCEDURE — 2580000003 HC RX 258: Performed by: FAMILY MEDICINE

## 2023-03-15 PROCEDURE — 87040 BLOOD CULTURE FOR BACTERIA: CPT

## 2023-03-15 PROCEDURE — 85025 COMPLETE CBC W/AUTO DIFF WBC: CPT

## 2023-03-15 PROCEDURE — 6360000002 HC RX W HCPCS: Performed by: FAMILY MEDICINE

## 2023-03-15 PROCEDURE — 80053 COMPREHEN METABOLIC PANEL: CPT

## 2023-03-15 PROCEDURE — 6360000002 HC RX W HCPCS: Performed by: INTERNAL MEDICINE

## 2023-03-15 PROCEDURE — 36415 COLL VENOUS BLD VENIPUNCTURE: CPT

## 2023-03-15 RX ORDER — SEVELAMER CARBONATE 800 MG/1
800 TABLET, FILM COATED ORAL
Status: DISCONTINUED | OUTPATIENT
Start: 2023-03-15 | End: 2023-03-18 | Stop reason: HOSPADM

## 2023-03-15 RX ORDER — ASCORBIC ACID 500 MG
500 TABLET ORAL 2 TIMES DAILY
COMMUNITY

## 2023-03-15 RX ORDER — MIDODRINE HYDROCHLORIDE 5 MG/1
15 TABLET ORAL
Status: DISCONTINUED | OUTPATIENT
Start: 2023-03-15 | End: 2023-03-18 | Stop reason: HOSPADM

## 2023-03-15 RX ORDER — ACETAMINOPHEN 650 MG/1
650 SUPPOSITORY RECTAL EVERY 6 HOURS PRN
Status: DISCONTINUED | OUTPATIENT
Start: 2023-03-15 | End: 2023-03-18 | Stop reason: HOSPADM

## 2023-03-15 RX ORDER — METOPROLOL SUCCINATE 25 MG/1
25 TABLET, EXTENDED RELEASE ORAL DAILY
Status: DISCONTINUED | OUTPATIENT
Start: 2023-03-15 | End: 2023-03-18 | Stop reason: HOSPADM

## 2023-03-15 RX ORDER — SODIUM CHLORIDE 9 MG/ML
INJECTION, SOLUTION INTRAVENOUS PRN
Status: DISCONTINUED | OUTPATIENT
Start: 2023-03-15 | End: 2023-03-18 | Stop reason: HOSPADM

## 2023-03-15 RX ORDER — POLYETHYLENE GLYCOL 3350 17 G/17G
17 POWDER, FOR SOLUTION ORAL DAILY PRN
Status: DISCONTINUED | OUTPATIENT
Start: 2023-03-15 | End: 2023-03-18 | Stop reason: HOSPADM

## 2023-03-15 RX ORDER — INSULIN LISPRO 100 [IU]/ML
0-8 INJECTION, SOLUTION INTRAVENOUS; SUBCUTANEOUS
Status: DISCONTINUED | OUTPATIENT
Start: 2023-03-15 | End: 2023-03-18 | Stop reason: HOSPADM

## 2023-03-15 RX ORDER — TRAZODONE HYDROCHLORIDE 50 MG/1
25 TABLET ORAL NIGHTLY
Status: DISCONTINUED | OUTPATIENT
Start: 2023-03-15 | End: 2023-03-18 | Stop reason: HOSPADM

## 2023-03-15 RX ORDER — SODIUM CHLORIDE 0.9 % (FLUSH) 0.9 %
5-40 SYRINGE (ML) INJECTION EVERY 12 HOURS SCHEDULED
Status: DISCONTINUED | OUTPATIENT
Start: 2023-03-15 | End: 2023-03-18 | Stop reason: HOSPADM

## 2023-03-15 RX ORDER — ONDANSETRON 4 MG/1
4 TABLET, ORALLY DISINTEGRATING ORAL EVERY 8 HOURS PRN
Status: DISCONTINUED | OUTPATIENT
Start: 2023-03-15 | End: 2023-03-15

## 2023-03-15 RX ORDER — DEXTROSE MONOHYDRATE 100 MG/ML
INJECTION, SOLUTION INTRAVENOUS CONTINUOUS PRN
Status: DISCONTINUED | OUTPATIENT
Start: 2023-03-15 | End: 2023-03-18 | Stop reason: HOSPADM

## 2023-03-15 RX ORDER — ESCITALOPRAM OXALATE 10 MG/1
10 TABLET ORAL DAILY
Status: DISCONTINUED | OUTPATIENT
Start: 2023-03-15 | End: 2023-03-18 | Stop reason: HOSPADM

## 2023-03-15 RX ORDER — POLYETHYLENE GLYCOL 3350 17 G/17G
17 POWDER, FOR SOLUTION ORAL DAILY PRN
Status: DISCONTINUED | OUTPATIENT
Start: 2023-03-15 | End: 2023-03-15

## 2023-03-15 RX ORDER — SENNA PLUS 8.6 MG/1
2 TABLET ORAL 2 TIMES DAILY
Status: DISCONTINUED | OUTPATIENT
Start: 2023-03-15 | End: 2023-03-18 | Stop reason: HOSPADM

## 2023-03-15 RX ORDER — INSULIN LISPRO 100 [IU]/ML
0-4 INJECTION, SOLUTION INTRAVENOUS; SUBCUTANEOUS NIGHTLY
Status: DISCONTINUED | OUTPATIENT
Start: 2023-03-15 | End: 2023-03-18 | Stop reason: HOSPADM

## 2023-03-15 RX ORDER — ONDANSETRON 2 MG/ML
4 INJECTION INTRAMUSCULAR; INTRAVENOUS EVERY 6 HOURS PRN
Status: DISCONTINUED | OUTPATIENT
Start: 2023-03-15 | End: 2023-03-15

## 2023-03-15 RX ORDER — VITAMIN B COMPLEX
1000 TABLET ORAL DAILY
Status: DISCONTINUED | OUTPATIENT
Start: 2023-03-15 | End: 2023-03-18 | Stop reason: HOSPADM

## 2023-03-15 RX ORDER — INSULIN GLARGINE-YFGN 100 [IU]/ML
6 INJECTION, SOLUTION SUBCUTANEOUS NIGHTLY
Status: DISCONTINUED | OUTPATIENT
Start: 2023-03-15 | End: 2023-03-18 | Stop reason: HOSPADM

## 2023-03-15 RX ORDER — SODIUM CHLORIDE 0.9 % (FLUSH) 0.9 %
5-40 SYRINGE (ML) INJECTION PRN
Status: DISCONTINUED | OUTPATIENT
Start: 2023-03-15 | End: 2023-03-18 | Stop reason: HOSPADM

## 2023-03-15 RX ORDER — BISACODYL 10 MG
10 SUPPOSITORY, RECTAL RECTAL DAILY PRN
Status: DISCONTINUED | OUTPATIENT
Start: 2023-03-15 | End: 2023-03-18 | Stop reason: HOSPADM

## 2023-03-15 RX ORDER — ACETAMINOPHEN 325 MG/1
650 TABLET ORAL EVERY 6 HOURS PRN
Status: DISCONTINUED | OUTPATIENT
Start: 2023-03-15 | End: 2023-03-18 | Stop reason: HOSPADM

## 2023-03-15 RX ORDER — BUSPIRONE HYDROCHLORIDE 5 MG/1
10 TABLET ORAL DAILY
Status: DISCONTINUED | OUTPATIENT
Start: 2023-03-15 | End: 2023-03-18 | Stop reason: HOSPADM

## 2023-03-15 RX ORDER — TRAMADOL HYDROCHLORIDE 50 MG/1
50 TABLET ORAL EVERY 8 HOURS PRN
Status: ON HOLD | COMMUNITY
End: 2023-03-18 | Stop reason: HOSPADM

## 2023-03-15 RX ORDER — ENOXAPARIN SODIUM 100 MG/ML
30 INJECTION SUBCUTANEOUS DAILY
Status: DISCONTINUED | OUTPATIENT
Start: 2023-03-15 | End: 2023-03-16 | Stop reason: ALTCHOICE

## 2023-03-15 RX ADMIN — VANCOMYCIN HYDROCHLORIDE 750 MG: 10 INJECTION, POWDER, LYOPHILIZED, FOR SOLUTION INTRAVENOUS at 15:01

## 2023-03-15 RX ADMIN — ESCITALOPRAM OXALATE 10 MG: 10 TABLET, FILM COATED ORAL at 12:27

## 2023-03-15 RX ADMIN — BUSPIRONE HYDROCHLORIDE 10 MG: 5 TABLET ORAL at 12:26

## 2023-03-15 RX ADMIN — Medication 10 ML: at 20:58

## 2023-03-15 RX ADMIN — SEVELAMER CARBONATE 800 MG: 800 TABLET, FILM COATED ORAL at 12:26

## 2023-03-15 RX ADMIN — SENNOSIDES 17.2 MG: 8.6 TABLET, FILM COATED ORAL at 12:26

## 2023-03-15 RX ADMIN — INSULIN GLARGINE-YFGN 6 UNITS: 100 INJECTION, SOLUTION SUBCUTANEOUS at 20:51

## 2023-03-15 RX ADMIN — ENOXAPARIN SODIUM 30 MG: 100 INJECTION SUBCUTANEOUS at 12:27

## 2023-03-15 RX ADMIN — MIDODRINE HYDROCHLORIDE 15 MG: 5 TABLET ORAL at 12:26

## 2023-03-15 RX ADMIN — SENNOSIDES 17.2 MG: 8.6 TABLET, FILM COATED ORAL at 20:35

## 2023-03-15 RX ADMIN — METOPROLOL SUCCINATE 25 MG: 25 TABLET, EXTENDED RELEASE ORAL at 12:26

## 2023-03-15 RX ADMIN — TRAZODONE HYDROCHLORIDE 25 MG: 50 TABLET ORAL at 01:37

## 2023-03-15 RX ADMIN — TRAZODONE HYDROCHLORIDE 25 MG: 50 TABLET ORAL at 20:35

## 2023-03-15 RX ADMIN — Medication 1000 UNITS: at 12:28

## 2023-03-15 ASSESSMENT — PAIN SCALES - GENERAL
PAINLEVEL_OUTOF10: 0
PAINLEVEL_OUTOF10: 0

## 2023-03-15 NOTE — H&P
[address]                  [address] [facility],  [facility]                              HISTORY AND PHYSICAL    PATIENT NAME: Amelie De La Garza                      :        1950  MED REC NO:   [id number]                            ROOM:       21  ACCOUNT NO:   [de-identified]                           ADMIT DATE: 2023  PROVIDER:     Irish Davalos MD    CHIEF COMPLAINT:  Altered mental status, probable sepsis. HISTORY OF PRESENTING ILLNESS:  A 65-year-old who has had multiple  admissions for confusion and mental status changes, was sent again in  from the nursing home for the same. The patient found to have a  purulent wound in right leg just above the knee. Also, she has chronic  sacral decubitus which is possibly infected. I saw the patient this  morning. At that time, she was alert and oriented to time, person, and  place. She knew the year and the president. She had no specific  complaints. RECENT AND PRESENT MEDICATIONS:  See med rec in the chart. PAST MEDICAL HISTORY:  Very remote history of sarcoidosis; history of  chronic kidney disease, on hemodialysis; history of hypertension with  orthostatic hypotension; and insulin-dependent diabetes mellitus. She  has had a chronic sacral decubitus for quite some time. Recently, had a  fracture surgery to right lower leg. SOCIAL HISTORY:  Resides at extended care facility. Does not drink. Does not smoke. ALLERGIES:  None. REVIEW OF SYSTEMS:  SKIN AND LYMPHATICS:  As described above. CENTRAL NERVOUS SYSTEM:  At this point, when I asked her, she was alert  and oriented x3. She denies any confusion at this point. CIRCULATORY:  Denies chest pain, orthopnea, PND.  DIGESTIVE:  Chronic constipation. She denies any abdominal pain at this  time. GENITOURINARY:  On chronic hemodialysis. In fact, she was seen in  hemodialysis. She denies any dysuria or frequency.     PHYSICAL EXAMINATION:  GENERAL:  I saw her in hemodialysis. She was in no acute distress. She  was alert and oriented to person, place, and time. Once again, she knew  the year and the president. SKIN:  Showed pallor but no jaundice. NECK:  Supple. CHEST:  Clear to auscultation. HEART:  Regular without murmur, gallop or rub. ABDOMEN:  Obese, soft, nontender. She has an umbilical hernia which is  nontender. EXTREMITIES:  Revealed very minimal, if any, edema. She had an open  wound on the right leg just above the knee. There was some drainage  there. Sacral decubitus, probably stage III still. This was present on  admission as was the wound of the leg. LABORATORIES:  This admission, her CMP is basically unremarkable. Creatinine was 3.4. Sugar was 120. Ammonia level was normal at 11. CBC did show a white count of 12.7, hemoglobin 10.1, platelet count of  791,277. CTs of the head showed no acute intracranial abnormality. Abdominal CT did show no acute disease, chronic fracture distal femur  with intramedullary brien and screws with incomplete osseous bridging,  cutaneous scarring or mild ulceration over the lateral suprageniculate  knee soft tissues, likely the result of scarring from screw and plate  fixation. No tracking subcutaneous emphysema. CT of the abdomen showed  distended rectum containing hardened stool suggestive of fecal  impaction, interval decrease in pleural effusions, no evidence of sacral  abscess. DIAGNOSTIC IMPRESSION:  Concerned about sepsis with acute mental status,  although she appears to be clear this morning. PLAN:  Once again, she will be admitted. ID has been consulted. She  will continue dialysis three days a week while she is here. Continue  her current medications. DVT prophylaxis. Discharge back to extended  care facility once stable.         Ayesha Champagne MD    D: 03/15/2023 12:57:26       T: 03/15/2023 13:01:14     [email or web address]  Job#: [id number]     Doc#: [id number]    CC:

## 2023-03-15 NOTE — PROGRESS NOTES
Patient arrived to floor very uncooperative and screaming at me. Patient frustrated and yelling as we assessed her wounds and telling me to leave her alone, patient refused vitals and heart monitor at this time.

## 2023-03-15 NOTE — CONSULTS
Department of Orthopedic Surgery  Consult Note    Reason for Consult: S/p wound complication right distal femur ORIF    HISTORY OF PRESENT ILLNESS:       Patient is a 67 y.o. female who presented to the emergency department for altered mental status. Patient sustained a right distal femur fracture and a left distal radius fracture following a fall on 11/4/2022 for which she underwent an open reduction internal fixation of both the right distal femur and left wrist by Dr Jarred Doss. Postop course was significant for chronic nonhealing wound secondary to her postop knee brace with no track to the surgical hardware. Patient has been treated with a course of doxycycline and local wound care. Improvement was reported on the last follow-up visit 1/24/2023. Patient was scheduled for a subsequent follow-up visit on 2/24/2023 but did not honor her appointment. Postop course of her left radius fracture was insignificant. Patient states that she is able to weight-bear as tolerated on her right lower extremity with no significant discomfort. She adds that her left distal radius has healed completely. Denies numbness/tingling/paresthesias. Denies any other orthopedic complaints at this time.       Past Medical History:        Diagnosis Date    Anemia in chronic kidney disease     Anxiety and depression     Arthritis     Chronic pain syndrome     COVID-19 05/2020    COVID-19     Decreased dorsalis pedis pulse 10/25/2022    Diabetes mellitus ([address]     Dysphagia, oral phase     ESRD on hemodialysis ([address] 10/25/2022    GERD (gastroesophageal reflux disease)     Hemod[facility] patient ([facility]     M-W-F    Hyperkalemia     Hypertension     Hypertensive kidney disease with stage 4 chronic kidney disease (HCC)     Insomnia     Kidney stones     MDD (major depressive disorder)     Moderate protein-calorie malnutrition (HCC)     Morbid obesity (HCC)     Muscle weakness (generalized)     Obesity     Pressure injury of sacral region, stage 3 ([address] 10/25/2022    Pressure ulcer of sacral region     Sacral pressure ulcer 08/03/2021    stage 4    Unspecified osteoarthritis, unspecified site     Weakness generalized      Past Surgical History:        Procedure Laterality Date    ABDOMEN SURGERY N/A 5/4/2020    SACRAL WOUND DEBRIDEMENT CALL  WITH TIME AVAIL AM performed by Venkatesh Koehler MD at [address] [email or web address]    CHOLECYSTECTOMY  3/31/16    Laparoscopic-Dr. Anette Campbell  2011     for kidney stones    DIALYSIS FISTULA CREATION Left 8/5/2021    INSERTION FISTULA LEFT ARM performed by Gloria Bronson MD at [address] Right 11/18/2021    REVISION AV FISTULA LEFT ARM performed by Gloria Bronson MD at [address] Right 11/4/2022    RIGHT DISTAL FEMUR OPEN REDUCTION INTERNAL FIXATION ++SYNTHES++ performed by Anju Koehler MD at [place] [address]  2009     right     UPPER GASTROINTESTINAL ENDOSCOPY  2.18.15    Dr. Kayla Cornelius Findings: Mild Gastrits and Duodenitis, 2cm Hiatal Hernia    UPPER GASTROINTESTINAL ENDOSCOPY N/A 5/8/2020    EGD CONTROL HEMORRHAGE performed by Venkatesh Koehler MD at [address] N/A 5/22/2020    EGD BEDSIDE performed by Sreedhar Peter MD at [place] [address] 5/6/2021    INSERTION TUNNELED DIALYSIS CATHETER performed by Gloria Bronson MD at [address] [facility] Left 11/4/2022    LEFT DISTAL RADIUS OPEN REDUCTION INTERNAL FIXATION    ++SYNTHES++ performed by Anju Koehler MD at [facility] OR     Current Medications:   Current Facility-Administered Medications: sodium chloride flush 0.9 % injection 10 mL, 10 mL, IntraVENous, PRN  0.9 % sodium chloride bolus, 1,000 mL, IntraVENous, Once  Allergies:  Patient has no known allergies. Social History:   TOBACCO:   reports that she quit smoking about 11 years ago. Her smoking use included cigarettes.  She has a 30.00 pack-year smoking history. She has never used smokeless tobacco.  ETOH:   reports no history of alcohol use. DRUGS:   reports no history of drug use. ACTIVITIES OF DAILY LIVING:    OCCUPATION:    Family History:       Problem Relation Age of Onset    Cancer Sister        REVIEW OF SYSTEMS:  CONSTITUTIONAL:  negative for acute fevers, chills  EYES:  negative for acute blurred vision, visual disturbance  HEENT:  negative for acute hearing loss, voice change  RESPIRATORY:  negative for acute dyspnea, wheezing  CARDIOVASCULAR:  negative for acute chest pain, palpitations  GASTROINTESTINAL:  negative for acute nausea, vomiting  GENITOURINARY:  negative for acute frequency, urinary incontinence  HEMATOLOGIC/LYMPHATIC:  negative for acute bleeding and petechiae  MUSCULOSKELETAL: See HPI  NEUROLOGICAL:  negative for acute headaches, dizziness  BEHAVIOR/PSYCH:  negative for acute increased agitation and anxiety    PHYSICAL EXAM:    VITALS:  /74   Pulse 96   Temp 98.2 °F (36.8 °C)   Resp 19   Ht 5' 1\" (1.549 m)   Wt 195 lb (88.5 kg)   SpO2 93%   BMI 36.84 kg/m²   CONSTITUTIONAL:  awake, alert, distracted, mild distress, appears stated age, and morbidly obese  MUSCULOSKELETAL:  Right lower Extremity:  Chronic incisional wound over superolateral knee region with clear exudate. Appreciate clinical picture below  No erythema, purulent discharge or active sign of infection noted  Compartments soft and compressible  Minimal tenderness to palpation over knee. No tenderness to palpation over foot, ankle, hip  Capillary refill less than 3 seconds. +2/4 DP and PT pulses  Patient able to flex/extend all toes.   Distal sensation grossly intact to superficial peroneal/deep peroneal/sural/saphenous/tibial nerve distributions          Secondary Exam:   bilateralUE: -TTP to fingers, hand, wrist, forearm, elbow, humerus, shoulder or clavicle.-, +2/4 Radial pulse, cap refill <3sec, +AIN/PIN/Radial/Ulnar/Median N, distal sensation grossly intact to C4-T1 dermatomes, compartments soft and compressible. leftLE: -TTP to foot, ankle, leg, knee, thigh, hip.-+2/4 DP & PT pulses, cap refill <3sec, distal sensation grossly intact to L4-S1 dermatomes, compartments soft and compressible. DATA:    CBC:   Lab Results   Component Value Date/Time    WBC 12.7 03/14/2023 12:25 PM    RBC 2.94 03/14/2023 12:25 PM    HGB 10.1 03/14/2023 12:25 PM    HCT 32.1 03/14/2023 12:25 PM    .2 03/14/2023 12:25 PM    MCH 34.4 03/14/2023 12:25 PM    MCHC 31.5 03/14/2023 12:25 PM    RDW 13.9 03/14/2023 12:25 PM     03/14/2023 12:25 PM    MPV 11.0 03/14/2023 12:25 PM     PT/INR:    Lab Results   Component Value Date/Time    PROTIME 10.6 11/23/2022 04:26 AM    PROTIME 15.3 06/25/2011 07:00 PM    INR 1.0 11/23/2022 04:26 AM       Radiology Review:    X-ray right knee demonstrate s/p right femur intramedullary nail with distal femur claw plate fixation. Screws and plate in place with no obvious sign of screws pullout or hardware failure. Diffuse bone quality noted. Severe degenerative changes in the knee joint. CT right knee demonstrate findings described above. Superficial cutaneous ulceration noted with no intra-articular communication, no extension into the surgical hardware. IMPRESSION:  S/p chronic wound right distal femur ORIF 11/4/2022    PLAN:  Weightbearing as tolerated right lower extremity  Continue local wound care  PT/OT  No other orthopedic intervention at this time. Orthopedic will continue to follow peripherally during hospital stay.   Discussed with attending    Siddhartha Bateman DO  Orthopaedic Surgery Resident PGY-1

## 2023-03-15 NOTE — PLAN OF CARE
Problem: Safety - Adult  Goal: Free from fall injury  Outcome: Progressing     Problem: Discharge Planning  Goal: Discharge to home or other facility with appropriate resources  Outcome: Progressing  Flowsheets (Taken 3/15/2023 1827)  Discharge to home or other facility with appropriate resources: Identify barriers to discharge with patient and caregiver     Problem: Skin/Tissue Integrity  Goal: Absence of new skin breakdown  Description: 1. Monitor for areas of redness and/or skin breakdown  2. Assess vascular access sites hourly  3. Every 4-6 hours minimum:  Change oxygen saturation probe site  4. Every 4-6 hours:  If on nasal continuous positive airway pressure, respiratory therapy assess nares and determine need for appliance change or resting period.   Outcome: Progressing     Problem: ABCDS Injury Assessment  Goal: Absence of physical injury  Outcome: Progressing     Problem: Risk for Elopement  Goal: Patient will not exit the unit/facility without proper excort  Outcome: Progressing  Flowsheets  Taken 3/15/2023 1827  Nursing Interventions for Elopement Risk:   Assist with personal care needs such as toileting, eating, dressing, as needed to reduce the risk of wandering   Collaborate with family members/caregivers to mitigate the elopement risk   Collaborate with treatment team for drug withdrawal symptoms treatment   Communicate/escalate to charge nurse the risk of elopement   Collaborate with treatment team for nicotine replacement  Taken 3/15/2023 1700  Nursing Interventions for Elopement Risk:   Assist with personal care needs such as toileting, eating, dressing, as needed to reduce the risk of wandering   Collaborate with family members/caregivers to mitigate the elopement risk   Collaborate with treatment team for drug withdrawal symptoms treatment   Communicate/escalate to charge nurse the risk of elopement   Collaborate with treatment team for nicotine replacement

## 2023-03-15 NOTE — PROGRESS NOTES
Pharmacy Consultation Note  (Antibiotic Dosing and Monitoring)    Initial consult date: 3/15  Consulting physician/provider: Dr. Katalina Hope  Drug: Vancomycin  Indication: Bloodstream infection    Age/  Gender Height Weight IBW  Allergy Information   72 y.o./female 5' 1\" (154.9 cm) 195 lb (88.5 kg)     Ideal body weight: 47.8 kg (105 lb 6.1 oz)  Adjusted ideal body weight: 64.1 kg (141 lb 3.6 oz)   Patient has no known allergies. Renal Function:  Recent Labs     03/13/23  1100 03/14/23  1225 03/15/23  0748   BUN 42* 19 24*   CREATININE 6.4* 3.4* 4.5*       Intake/Output Summary (Last 24 hours) at 3/15/2023 1335  Last data filed at 3/15/2023 1148  Gross per 24 hour   Intake 880.07 ml   Output 2300 ml   Net -1419.93 ml       Vancomycin Monitoring:  Trough:  No results for input(s): VANCOTROUGH in the last 72 hours. Random:  No results for input(s): VANCORANDOM in the last 72 hours. No results for input(s): Amaryllis [place] in the last 72 hours. Historical Cultures:  Organism   Date Value Ref Range Status   03/14/2023 Gram negative brien (A)  Preliminary     Recent Labs     03/14/23  1225   BC Gram stain performed from blood culture bottle media  Gram positive coccobacilli  *       Vancomycin Administration Times:  Recent vancomycin administrations                     vancomycin (VANCOCIN) 1,750 mg in sodium chloride 0.9 % 500 mL IVPB (mg) 1,750 mg New Bag 03/14/23 1718                    Assessment:  Patient is a 67 y.o. female who has been initiated on vancomycin  Estimated Creatinine Clearance: 11 mL/min (A) (based on SCr of 4.5 mg/dL (H)).   To dose vancomycin, pharmacy will be utilizing dosing based off of levels due to patient requiring hemodialysis  3/15: Patient was given vanco 1750 mg on 3/14, then received dialysis on 3/15 at 7am    Plan:  Vancomycin 750 mg IV x 1  Random vancomycin level with AM labs tomorrow  Will continue to monitor renal function   Pharmacy to follow    Grecia Kramer, PotriaD, BCCCP 3/15/2023 1:36 PM  Pharmacy Clinical Specialist, Emergency Medicine  [phone]

## 2023-03-15 NOTE — FLOWSHEET NOTE
03/15/23 1148   Vital Signs   BP (!) 141/41   Temp 96.9 °F (36.1 °C)   Heart Rate 81   Resp 20   Weight   (cart)   Pain Assessment   Pain Assessment None - Denies Pain   Post-Hemodialysis Assessment   Post-Treatment Procedures Blood returned; Access bleeding time < 10 minutes   Machine Disinfection Process Exterior Machine Disinfection   Rinseback Volume (ml) 300 ml   Blood Volume Processed (Liters) 61.2 l/min   Dialyzer Clearance Moderately streaked   Duration of Treatment (minutes) 210 minutes   Heparin Amount Administered During Treatment (mL) 0 mL   Hemodialysis Intake (ml) 300 ml   Hemodialysis Output (ml) 2300 ml   NET Removed (ml) 2000   Tolerated Treatment Good   Patient Response to Treatment tolerated well   Bilateral Breath Sounds Diminished   Edema Right upper extremity   RUE Edema +1   RLE Edema +1   Physician Notified No   Time Off 1117   Patient Disposition Return to room

## 2023-03-15 NOTE — PROGRESS NOTES
Skin assessment completed to the best of my ability at this time patient is refusing further assessment. Wound consult placed for recorded wounds found. Will re-attempt to finish at a later time.

## 2023-03-15 NOTE — CARE COORDINATION
Social Work /Transition of Care:    Pt presented  to the ED yesterday 3/14, secondary to altered mental status from [address] [place] [address]. Pt receives dialysis at [address] [place] [address] in the dialysis den. Pt arrived to the ED with a wound vac in place for a sacral wound. Per Sudarshan Nunez RN, wound vac given to staff from [address] [place] [address]. Pt is admitted inpatient with sepsis/ wound infection. Per liaison at [address] [place] [address] pt is a bed hold and able to return upon discharge. NABIL spoke to pt's son, Gala Franklin, who states plan will be for pt to return to [facility] upon discharge. NABIL/CM to follow.

## 2023-03-15 NOTE — CONSULTS
[facility]  Nephrology Consult Note    Patient's Name: Vick Mcgowan    Reason for Consult: Dialysis    Chief Complaint: Altered mental status  History Obtained From:  patient, past medical records, and EMR    History of Present Illness:    Vick Mcgowan is a 67 y.o. female with a past medical history of ESRD on hemodialysis, COVID, hypertension, and sacral pressure ulcer. She presented to the ED on 3/14 with altered mental status. Vital signs on 3/14 include temperature 98.2, respirations 20, pulse 98, /74, and she was 98% SPO2. Lab data on 3/14 includes creatinine 3.4, lactic acid 2.7, WBCs 12.7 K, and hemoglobin 10.1. She had a chest x-ray on 3/14 which showed vague patchy airspace disease seen within the right upper lobe and right lower lobe. X-ray right knee 3/14 no acute bony abnormalities or hardware complications, soft tissue swelling and questionable soft tissue gas along the lateral aspect. CT of the head no acute abnormality. We were consulted to see the patient for dialysis. Patient is known to our service and dialyzes at the dialysis den at [facility]. At present, patient was seen and examined. She reports that she feels hungry this morning. She denies any chest pain or shortness of breath. She denies any abdominal pain, nausea, vomiting, or diarrhea. She denies any headaches or dizziness. She denies any fevers or chills.     PMH:    Past Medical History:   Diagnosis Date    Anemia in chronic kidney disease     Anxiety and depression     Arthritis     Chronic pain syndrome     COVID-19 05/2020    COVID-19     Decreased dorsalis pedis pulse 10/25/2022    Diabetes mellitus ([facility] [address]     Dysphagia, oral phase     ESRD on hemodialysis ([facility] [address] 10/25/2022    GERD (gastroesophageal reflux disease)     Hemodialysis patient ([facility] [address]     M-W-F    Hyperkalemia     Hypertension     Hypertensive kidney disease with stage 4 chronic kidney disease (HCC)     Insomnia     Kidney stones     MDD (major depressive disorder)     Moderate protein-calorie malnutrition (HCC)     Morbid obesity (HCC)     Muscle weakness (generalized)     Obesity     Pressure injury of sacral region, stage 3 (HCC) 10/25/2022    Pressure ulcer of sacral region     Sacral pressure ulcer 08/03/2021    stage 4    Unspecified osteoarthritis, unspecified site     Weakness generalized        Patient Active Problem List   Diagnosis    Neurologic gait dysfunction    Diabetes mellitus ([address]    Hypertension    Arthritis    Knee problem    Anemia due to stage 3 chronic kidney disease    Primary osteoarthritis of both knees    Moderate protein-calorie malnutrition (HCC)    Pressure injury of sacral region, stage 4 (HCC)    Pressure injury of right hip, stage 3 (HCC)    Left bundle branch block    Moderate obesity    Failure to thrive in adult    Pressure injury of sacral region, stage 3 (HCC)    Decreased dorsalis pedis pulse    ESRD on hemodialysis ([address]    Closed bicondylar fracture of distal femur, right, initial encounter ([address]    Other fracture of right femur, initial encounter for closed fracture ([address]    Closed fracture of distal ends of left radius and ulna    Closed bicondylar fracture of distal end of right femur ([address]    Pneumonia due to infectious organism, unspecified laterality, unspecified part of lung    Pneumonia due to organism    Wound infection    Sepsis ([address]       Diet:    ADULT DIET;  Regular    Meds:     busPIRone  10 mg Oral Daily    [START ON 3/16/2023] epoetin derek-epbx  10,000 Units SubCUTAneous Once per day on Tue Thu Sat    escitalopram  10 mg Oral Daily    sevelamer  800 mg Oral TID WC    insulin glargine-yfgn  6 Units SubCUTAneous Nightly    metoprolol succinate  25 mg Oral Daily    midodrine  15 mg Oral Q MWF    senna  2 tablet Oral BID    traZODone  25 mg Oral Nightly    Vitamin D  1,000 Units Oral Daily    sodium chloride flush  5-40 mL IntraVENous 2 times per day    enoxaparin  30 mg SubCUTAneous Daily    insulin lispro  0-8 Units SubCUTAneous TID     insulin lispro  0-4 Units SubCUTAneous Nightly        sodium chloride      dextrose         Meds prn:     bisacodyl, sodium chloride flush, sodium chloride, ondansetron **OR** ondansetron, acetaminophen **OR** acetaminophen, glucose, dextrose bolus **OR** dextrose bolus, glucagon (rDNA), dextrose, polyethylene glycol, sodium chloride flush    Meds prior to admission:     No current facility-administered medications on file prior to encounter.      Current Outpatient Medications on File Prior to Encounter   Medication Sig Dispense Refill    traMADol (ULTRAM) 50 MG tablet Take 50 mg by mouth every 8 hours as needed for Pain.      vitamin C (ASCORBIC ACID) 500 MG tablet Take 500 mg by mouth 2 times daily      glucose 4 g chewable tablet Take 4 tablets by mouth as needed for Low blood sugar 60 tablet 3    ferric citrate (AURYXIA) 1  MG(Fe) TABS tablet Take 420 mg by mouth 3 times daily (with meals)      benzonatate (TESSALON) 100 MG capsule Take 100 mg by mouth 3 times daily as needed for Cough      bisacodyl (DULCOLAX) 10 MG suppository Place 10 mg rectally daily as needed for Constipation      sodium phosphate (FLEET) 7-19 GM/118ML Place 1 enema rectally once as needed (constipation)      glucose (GLUTOSE) 40 % GEL Take 15 g by mouth as needed (hypoglycemia)      guaiFENesin 200 MG/5ML LIQD Take 200 mg by mouth every 6 hours as needed (cough)      insulin lispro, 1 Unit Dial, (HUMALOG/ADMELOG) 100 UNIT/ML SOPN Inject 0-10 Units into the skin 3 times daily (before meals) *Per Sliding Scale*  160-200 = 2 units  201-250 = 4 units  251-300 = 6 units  301-350 = 8 units  351-400 = 10 units + notify MD      lactulose (CHRONULAC) 10 GM/15ML solution Take 10 g by mouth daily as needed (constipation)      busPIRone (BUSPAR) 10 MG tablet Take 10 mg by mouth daily      polyethylene glycol (GLYCOLAX) 17 g packet Take 17 g by mouth daily as needed for Constipation oxyCODONE-acetaminophen (PERCOCET) 5-325 MG per tablet Take 1 tablet by mouth every 6 hours as needed for Pain.      epoetin derek-epbx (RETACRIT) 35301 UNIT/ML SOLN injection Inject 10,000 Units into the skin See Admin Instructions Given Tuesday,Thursday,Saturday      senna (SENOKOT) 8.6 MG tablet Take 2 tablets by mouth 2 times daily      traZODone (DESYREL) 50 MG tablet Take 50 mg by mouth nightly      acetaminophen (TYLENOL) 325 MG tablet Take 650 mg by mouth every 4 hours as needed for Pain or Fever      vitamin D (CHOLECALCIFEROL) 25 MCG (1000 UT) TABS tablet Take 1,000 Units by mouth daily      ondansetron (ZOFRAN) 4 MG tablet Take 4 mg by mouth every 4 hours as needed for Nausea or Vomiting      insulin glargine (LANTUS) 100 UNIT/ML injection vial Inject 6 Units into the skin nightly 10 mL 3    midodrine (PROAMATINE) 5 MG tablet Take 15 mg by mouth See Admin Instructions Given Monday,Wednesday,Friday      metoprolol succinate (TOPROL XL) 25 MG extended release tablet Take 1 tablet by mouth daily 30 tablet 3    escitalopram (LEXAPRO) 10 MG tablet Take 10 mg by mouth daily      gabapentin (NEURONTIN) 300 MG capsule Take 300 mg by mouth daily. Allergies:    Patient has no known allergies. Social History:     reports that she quit smoking about 11 years ago. Her smoking use included cigarettes. She has a 30.00 pack-year smoking history. She has never used smokeless tobacco. She reports that she does not drink alcohol and does not use drugs. Family History:         Problem Relation Age of Onset    Cancer Sister        Review of Systems:   Pertinent items are noted in HPI.     Physical Exam:      Patient Vitals for the past 24 hrs:   BP Temp Temp src Pulse Resp SpO2 Height Weight   03/15/23 0830 (!) 173/35 -- -- 78 -- -- -- --   03/15/23 0800 (!) 174/46 -- -- 81 -- -- -- --   03/15/23 0733 (!) 149/74 -- -- 74 16 95 % -- --   03/15/23 0430 (!) 97/58 -- -- 73 15 -- -- --   03/15/23 0400 -- -- -- 73 19 -- -- --   03/15/23 0300 -- -- -- 77 14 -- -- --   03/15/23 0200 -- -- -- 90 17 -- -- --   03/15/23 0100 -- -- -- 91 16 -- -- --   03/15/23 0000 -- -- -- 94 17 -- -- --   03/14/23 2041 -- -- -- (!) 104 15 -- -- --   03/14/23 2015 -- 98.2 °F (36.8 °C) Oral -- -- -- -- --   03/14/23 2000 (!) 189/92 -- -- -- 17 -- -- --   03/14/23 1845 -- -- -- -- -- 93 % -- --   03/14/23 1830 -- -- -- -- -- 92 % -- --   03/14/23 1815 -- -- -- 96 19 94 % -- --   03/14/23 1215 138/74 -- -- 90 19 95 % -- --   03/14/23 1159 (!) 140/74 98.2 °F (36.8 °C) -- 98 20 98 % 5' 1\" (1.549 m) 195 lb (88.5 kg)         Intake/Output Summary (Last 24 hours) at 3/15/2023 0854  Last data filed at 3/15/2023 0110  Gross per 24 hour   Intake 580.07 ml   Output --   Net 580.07 ml       General: Awake, alert, no acute distress  Neck: No JVD noted  Lungs: Clear bilaterally upper, diminished to the bases bilaterally. Unlabored  CV: Regular rate and rhythm. No rub  Abd: Soft, nontender, nondistended. Active bowel sounds  Skin: Warm and dry. No rash on exposed extremities  Ext: Trace BLE edema   Neuro: Awake, answers questions appropriately    Data:    Recent Labs     03/13/23  1100 03/14/23  1225 03/15/23  0748   WBC 13.6* 12.7* 7.1   HGB 11.1* 10.1* 9.8*   HCT 35.2 32.1* 31.5*   .7* 109.2* 110.1*    172 163       Recent Labs     03/13/23  1100 03/14/23  1225 03/15/23  0748    134 135   K 5.1* 3.9 4.0    94* 97*   CO2 28 27  --    CREATININE 6.4* 3.4* 4.5*   BUN 42* 19  --    LABGLOM 6 14 10   GLUCOSE 157* 120*  --    CALCIUM 9.7 9.7  --        Vit D, 25-Hydroxy   Date Value Ref Range Status   11/11/2022 32 30 - 100 ng/mL Final     Comment:     <20 ng/mL. ........... Tanda Bun Deficient  20-30 ng/mL. ......... Tanda Bun Insufficient   ng/mL. ........ Tanda Bun Sufficient  >100 ng/mL. .......... Tanda Bun Toxic         PTH   Date Value Ref Range Status   11/06/2022 466 (H) 15 - 65 pg/mL Final       Recent Labs     03/13/23  1100 03/14/23  1225 03/15/23  0748   ALT <5 8 6 AST 12 14  --    ALKPHOS 69 63 58   BILITOT 0.4 0.5  --        Recent Labs     03/13/23  1100 03/14/23  1225 03/15/23  0748   LABALBU 3.5 3.4* 3.1*       Ferritin   Date Value Ref Range Status   05/05/2021 767 ng/mL Final     Comment:     FERRITIN Reference Ranges:  Adult Males   20 - 60 years:    30 - 400 ng/mL  Adult females 16 - 60 years:    15 - 150 ng/mL  Adults greater than 60 years:   no established reference range  Pediatrics:                     no established reference range       Iron   Date Value Ref Range Status   05/05/2021 74 37 - 145 mcg/dL Final     TIBC   Date Value Ref Range Status   05/05/2021 116 (L) 250 - 450 mcg/dL Final       Vitamin B-12   Date Value Ref Range Status   05/05/2021 895 211 - 946 pg/mL Final       Folate   Date Value Ref Range Status   05/05/2021 >20.0 4.8 - 24.2 ng/mL Final       Lab Results   Component Value Date/Time    COLORU Yellow 03/14/2023 04:50 PM    NITRU Negative 03/14/2023 04:50 PM    GLUCOSEU 100 03/14/2023 04:50 PM    GLUCOSEU NEGATIVE 08/17/2011 10:30 PM    KETUA Negative 03/14/2023 04:50 PM    UROBILINOGEN 0.2 03/14/2023 04:50 PM    BILIRUBINUR Negative 03/14/2023 04:50 PM    BILIRUBINUR NEGATIVE 08/17/2011 10:30 PM       No results found for: MAGDA, CREURRAN, MACREATRATIO, OSMOU    No components found for: URIC    No results found for: LIPIDPAN    Assessment and Plans:    ESRD on HD  Outpatient at the dialysis den at [facility] while inpatient-HD today    2. Altered mental status  CT of the head no acute abnormality  Management per primary service    3. Wounds  CT abd pelvis 3/14 no significant change in sacral decubitus ulcer  XR right knee 3/14 soft tissue swelling  CT right knee 3/14 chronic fracture distal femoral and cutaneous scarring mild ulceration   Infectious disease and orthopedic surgery consulted    4.   Anemia in CKD  Hemoglobin target 10-12  Hemoglobin 9.8 today-below target  ARUN  Transfused hemoglobin<7  Monitor H&H    5. Secondary hyperparathyroidism of renal origin   and phosphorus 3.7 on 3/2 in the outpatient setting  Check Phos in the a.m. On [place]  Monitor labs    6. Hypertension CKD 5/ESRD  BP goal<140/90  BP above goal  Monitor BPs    PABLO Barton CNP    Patient seen and examined all key components of the physical performed independently , case discussed with NP, all pertinent labs and radiologic tests personally reviewed agree with above.       Renal Procedure:hemodialysis    Shani George MD

## 2023-03-15 NOTE — CONSULTS
[facility] CONSULT NOTE    Reason for Consult: Right leg wound infection  Requested by: Nicki Wagoner MD    Chief complaint: Confusion    History Obtained From: Patient and EMR     HISTORY Romeo              The patient is a 67 y.o. female with history of hypertension, DM, ESRD on hemodialysis, sacral pressure ulcer,  right distal femur and left distal radius fractures following a fall on 11/4/2022 s/p ORIF of both the right distal femur and left wrist complicated by nonhealing wound associated with postop knee brace but not tracking to hardware, presented on 03/14 with confusion. On admission, she was afebrile and hemodynamically stable with leukocytosis of 13,000. CXR showed very patchy airspace disease within right upper and lower lobe, improved compared to that from 02/16. CT abdomen and pelvis showed no significant change in the sacral decubitus ulcer with stable appearance of the underlying bone and no evidence of abscess, distended rectum containing hardened stool and mild wall thickening suggestive of fecal impaction. CT right knee showed chronic fracture of distal femoral metadiaphysis with intact traversing intramedullary and lateral screw plate ORIF hardware with incomplete osseous bridging, cutaneous scarring or mild ulceration over the lateral supra geniculate knee soft tissues, likely the result of scarring from lateral screw plate fixation with no significant tracking subcutaneous emphysema. Urinalysis showed pyuria of 5-10 WBCs with urine culture showing no growth to date. Leg wound culture showed heavy growth of Gram-negative rods. Blood cultures showed Gram-positive coccobacilli (Enterococcus faecalis and methicillin-resistant Staphylococcus epidermidis by PCR). She received a dose of piperacillin-tazobactam and vancomycin on admission. ID service was subsequently consulted for further recommendations.     Past Medical History  Past Medical History:   Diagnosis Date    Anemia in chronic kidney disease     Anxiety and depression     Arthritis     Chronic pain syndrome     COVID-19 05/2020    COVID-19     Decreased dorsalis pedis pulse 10/25/2022    Diabetes mellitus ([facility]     Dysphagia, oral phase     ESRD on hemodialysis ([facility] 10/25/2022    GERD (gastroesophageal reflux disease)     Hemodialysis patient ([facility]     M-W-F    Hyperkalemia     Hypertension     Hypertensive kidney disease with stage 4 chronic kidney disease (HCC)     Insomnia     Kidney stones     MDD (major depressive disorder)     Moderate protein-calorie malnutrition (HCC)     Morbid obesity (HCC)     Muscle weakness (generalized)     Obesity     Pressure injury of sacral region, stage 3 ([facility] 10/25/2022    Pressure ulcer of sacral region     Sacral pressure ulcer 08/03/2021    stage 4    Unspecified osteoarthritis, unspecified site     Weakness generalized        Current Facility-Administered Medications   Medication Dose Route Frequency Provider Last Rate Last Admin    bisacodyl (DULCOLAX) suppository 10 mg  10 mg Rectal Daily PRN Carmelo English MD        busPIRone (BUSPAR) tablet 10 mg  10 mg Oral Daily Carmelo English MD        [facility] ON 3/16/2023] epoetin derek-epbx (RETACRIT) injection 10,000 Units  10,000 Units SubCUTAneous Once per day on Tue Thu Sat Carmelo English MD        escitalopram (LEXAPRO) tablet 10 mg  10 mg Oral Daily Carmelo English MD        sevelamer (RENVELA) tablet 800 mg  800 mg Oral TID  Carmelo English MD        insulin [facility] injection vial 6 Units  6 Units SubCUTAneous Nightly Carmelo English MD        metoprolol succinate (TOPROL XL) extended release tablet 25 mg  25 mg Oral Daily Carmelo English MD        midodrine (PROAMATINE) tablet 15 mg  15 mg Oral Q MWF Carmelo English MD        senna (SENOKOT) tablet 17.2 mg  2 tablet Oral BID Carmelo English MD        traZODone (DESYREL) tablet 25 mg  25 mg Oral Nightly Carmelo English MD   25 mg at 03/15/23 0569    vitamin D (CHOLECALCIFEROL) tablet 1,000 Units  1,000 Units Oral Daily Bharat Lawrence MD        sodium chloride flush 0.9 % injection 5-40 mL  5-40 mL IntraVENous 2 times per day Bharat Lawrence MD        sodium chloride flush 0.9 % injection 5-40 mL  5-40 mL IntraVENous PRN Bharat Lawrence MD        0.9 % sodium chloride infusion   IntraVENous SHRUTHI Lawrence MD        enoxaparin Sodium (LOVENOX) injection 30 mg  30 mg SubCUTAneous Daily Bharat Lawrence MD        ondansetron (ZOFRAN-ODT) disintegrating tablet 4 mg  4 mg Oral Q8H PRN Bharat Lawrence MD        Or    ondansetron [facility]) injection 4 mg  4 mg IntraVENous Q6H PUNEETN Bharat Lawrence MD        acetaminophen (TYLENOL) tablet 650 mg  650 mg Oral Q6H PRN Bharat Lawrence MD        Or    acetaminophen (TYLENOL) suppository 650 mg  650 mg Rectal Q6H PRN Bharat Lawrence MD        insulin lispro (HUMALOG) injection vial 0-8 Units  0-8 Units SubCUTAneous TID WC Bharat Lawrence MD        insulin lispro (HUMALOG) injection vial 0-4 Units  0-4 Units SubCUTAneous Nightly Bharat Lawrence MD        glucose chewable tablet 16 g  4 tablet Oral PRN Bharat Lawrence MD        dextrose bolus 10% 125 mL  125 mL IntraVENous SHRUTHI Lawrence MD        Or    dextrose bolus 10% 250 mL  250 mL IntraVENous SHRUTHI Lawrence MD        glucagon injection 1 mg  1 mg SubCUTAneous PRN Bharat Lawrence MD        dextrose 10 % infusion   IntraVENous Continuous SHRUTHI Lawrence MD        polyethylene glycol (GLYCOLAX) packet 17 g  17 g Oral Daily PRN Bharat Lawrence MD        sodium chloride flush 0.9 % injection 10 mL  10 mL IntraVENous SHRUTHI Lawrence MD   10 mL at 03/14/23 4121     Current Outpatient Medications   Medication Sig Dispense Refill    traMADol (ULTRAM) 50 MG tablet Take 50 mg by mouth every 8 hours as needed for Pain.      vitamin C (ASCORBIC ACID) 500 MG tablet Take 500 mg by mouth 2 times daily      glucagon, rDNA, 1 MG injection Inject 1 mg into the muscle as needed for Low blood sugar      ferric citrate (AURYXIA) 1  MG(Fe) TABS tablet Take 420 mg by mouth 3 times daily (with meals) benzonatate (TESSALON) 100 MG capsule Take 100 mg by mouth 3 times daily as needed for Cough      bisacodyl (DULCOLAX) 10 MG suppository Place 10 mg rectally daily as needed for Constipation      glucose (GLUTOSE) 40 % GEL Take 15 g by mouth as needed (hypoglycemia)      guaiFENesin 200 MG/5ML LIQD Take 200 mg by mouth every 6 hours as needed (cough)      insulin lispro, 1 Unit Dial, (HUMALOG/ADMELOG) 100 UNIT/ML SOPN Inject 0-10 Units into the skin 3 times daily (before meals) *Per Sliding Scale*  160-200 = 2 units  201-250 = 4 units  251-300 = 6 units  301-350 = 8 units  351-400 = 10 units + notify MD      lactulose (CHRONULAC) 10 GM/15ML solution Take 10 g by mouth daily as needed (constipation)      busPIRone (BUSPAR) 10 MG tablet Take 10 mg by mouth daily      polyethylene glycol (GLYCOLAX) 17 g packet Take 17 g by mouth daily as needed for Constipation      oxyCODONE-acetaminophen (PERCOCET) 5-325 MG per tablet Take 1 tablet by mouth every 6 hours as needed for Pain.      epoetin derek-epbx (RETACRIT) 03781 UNIT/ML SOLN injection Inject 10,000 Units into the skin See Admin Instructions Given Tuesday,Thursday,Saturday      senna (SENOKOT) 8.6 MG tablet Take 2 tablets by mouth 2 times daily      traZODone (DESYREL) 50 MG tablet Take 50 mg by mouth nightly      acetaminophen (TYLENOL) 325 MG tablet Take 650 mg by mouth every 4 hours as needed for Pain or Fever      vitamin D (CHOLECALCIFEROL) 25 MCG (1000 UT) TABS tablet Take 1,000 Units by mouth daily      ondansetron (ZOFRAN) 4 MG tablet Take 4 mg by mouth every 4 hours as needed for Nausea or Vomiting      insulin glargine (LANTUS) 100 UNIT/ML injection vial Inject 6 Units into the skin nightly 10 mL 3    midodrine (PROAMATINE) 5 MG tablet Take 15 mg by mouth See Admin Instructions Given Monday,Wednesday,Friday      metoprolol succinate (TOPROL XL) 25 MG extended release tablet Take 1 tablet by mouth daily 30 tablet 3    escitalopram (LEXAPRO) 10 MG tablet Take 10 mg by mouth daily      gabapentin (NEURONTIN) 300 MG capsule Take 300 mg by mouth daily. No Known Allergies    Surgical History  Past Surgical History:   Procedure Laterality Date    ABDOMEN SURGERY N/A 2020    SACRAL WOUND DEBRIDEMENT CALL   WITH TIME AVAIL AM performed by Melissa Adler MD at [address]  x3    CHOLECYSTECTOMY  3/31/16    Laparoscopic-Dr. Dangelo Aver       for kidney stones    DIALYSIS FISTULA CREATION Left 2021    INSERTION FISTULA LEFT ARM performed by Arlene Longoria MD at [address] Right 2021    REVISION AV FISTULA LEFT ARM performed by Arlene Longoria MD at [place] [address] Right 2022    RIGHT DISTAL FEMUR OPEN REDUCTION INTERNAL FIXATION ++SYNTHES++ performed by Erin Almendarez MD at [place] [address]       right     UPPER GASTROINTESTINAL ENDOSCOPY  2.18.15    Dr. Sindhu Greenfield Findings: Mild Gastrits and Duodenitis, 2cm Hiatal Hernia    UPPER GASTROINTESTINAL ENDOSCOPY N/A 2020    EGD CONTROL HEMORRHAGE performed by Melissa Adler MD at [address] N/A 2020    EGD BEDSIDE performed by Reuben Kam MD at [address] [place] [address] N/A 2021    INSERTION TUNNELED DIALYSIS CATHETER performed by Arlene Longoria MD at [address] [facility] Left 2022    LEFT DISTAL RADIUS OPEN REDUCTION INTERNAL FIXATION    ++SYNTHES++ performed by Erin Almendarez MD at [facility] OR        Social History  Social History     Socioeconomic History    Marital status:    Tobacco Use    Smoking status: Former     Packs/day: 1.00     Years: 30.00     Pack years: 30.00     Types: Cigarettes     Quit date: 2011     Years since quittin.5    Smokeless tobacco: Never   Vaping Use    Vaping Use: Never used   Substance and Sexual Activity    Alcohol use: No    Drug use: No       Family Medical History  Family History   Problem Relation Age of Onset    Cancer Sister        Review of Systems:  Constitutional: No fever, no chills  Eyes: No vision changes, no retroorbital pain  ENT: No hearing changes, no ear pain  Respiratory: No cough, no dyspnea  Cardiovascular: No chest pain, no palpitations  Gastrointestinal: No abdominal pain, no diarrhea  Genitourinary: No dysuria, no hematuria  Integumentary: No rash, no itching  Musculoskeletal: No muscle pain, no joint pain  Neurologic: No headache, no numbness in extremities    Physical Examination:  Vitals:    03/15/23 0930 03/15/23 1000 03/15/23 1030 03/15/23 1100   BP: (!) 141/69 110/63 (!) 149/49 (!) 149/95   Pulse: 73 79 85 82   Resp:       Temp:       TempSrc:       SpO2:       Weight:       Height:         Constitutional: Alert, not in distress  Eyes: Sclerae anicteric, no conjunctival erythema  ENT: No buccal lesion, no pharyngeal exudates  Neck: No nuchal rigidity, no cervical adenopathy  Lungs: Clear breath sounds, no crackles, no wheezes  Heart: Regular rate and rhythm, no murmurs  Abdomen: Bowel sounds present, soft, nontender  Skin: Warm and dry, no active dermatoses. Sacral pressure ulcer stage 3 clean with no active purulence, surrounding necrosis and malodor  Musculoskeletal: Right leg wound with minimal yellow discharge. Labs, imaging, and medical records/notes were personally reviewed. Assessment:  Sepsis, secondary to Gram-positive coccobacilli (Enterococcus faecalis and methicillin-resistant Staphylococcus epidermidis by PCR) bacteremia, etiology unclear. Nonhealing wound, right leg, with no signs of gross infection  Sacral pressure ulcer    Plan:  Continue vancomycin dosed according to level per Pharmacy. Repeat blood cultures today. Follow up blood and wound cultures. Continue local wound care. Continue supportive care. Thank you for involving me in the care of [employer]. I will continue to follow.  Please do not hesitate to call for any questions or concerns.     Electronically signed by Narcisa Wise MD on 3/15/2023 at 11:10 AM

## 2023-03-16 PROBLEM — E44.0 MODERATE PROTEIN-CALORIE MALNUTRITION (HCC): Chronic | Status: ACTIVE | Noted: 2023-03-16

## 2023-03-16 PROBLEM — R41.0 DELIRIUM: Status: ACTIVE | Noted: 2023-03-16

## 2023-03-16 LAB
ANION GAP SERPL CALCULATED.3IONS-SCNC: 10 MMOL/L (ref 7–16)
BACTERIA BLD CULT ORG #2: NORMAL
BACTERIA BLD CULT: NORMAL
BACTERIA SPEC ANAEROBE CULT: NORMAL
BACTERIA UR CULT: NORMAL
BUN SERPL-MCNC: 15 MG/DL (ref 6–23)
CALCIUM SERPL-MCNC: 9.3 MG/DL (ref 8.6–10.2)
CHLORIDE SERPL-SCNC: 99 MMOL/L (ref 98–107)
CO2 SERPL-SCNC: 30 MMOL/L (ref 22–29)
CREAT SERPL-MCNC: 3.4 MG/DL (ref 0.5–1)
ERYTHROCYTE [DISTWIDTH] IN BLOOD BY AUTOMATED COUNT: 13.2 FL (ref 11.5–15)
GLUCOSE SERPL-MCNC: 89 MG/DL (ref 74–99)
HCT VFR BLD AUTO: 31 % (ref 34–48)
HGB BLD-MCNC: 9.9 G/DL (ref 11.5–15.5)
MAGNESIUM SERPL-MCNC: 2.1 MG/DL (ref 1.6–2.6)
MCH RBC QN AUTO: 34.7 PG (ref 26–35)
MCHC RBC AUTO-ENTMCNC: 31.9 % (ref 32–34.5)
MCV RBC AUTO: 108.8 FL (ref 80–99.9)
METER GLUCOSE: 106 MG/DL (ref 74–99)
METER GLUCOSE: 107 MG/DL (ref 74–99)
METER GLUCOSE: 127 MG/DL (ref 74–99)
METER GLUCOSE: 160 MG/DL (ref 74–99)
PHOSPHATE SERPL-MCNC: 3.9 MG/DL (ref 2.5–4.5)
PLATELET # BLD AUTO: 192 E9/L (ref 130–450)
PMV BLD AUTO: 10.7 FL (ref 7–12)
POTASSIUM SERPL-SCNC: 4.3 MMOL/L (ref 3.5–5)
RBC # BLD AUTO: 2.85 E12/L (ref 3.5–5.5)
SODIUM SERPL-SCNC: 139 MMOL/L (ref 132–146)
VANCOMYCIN SERPL-MCNC: 26.7 MCG/ML (ref 5–40)
WBC # BLD: 7.6 E9/L (ref 4.5–11.5)

## 2023-03-16 PROCEDURE — 36415 COLL VENOUS BLD VENIPUNCTURE: CPT

## 2023-03-16 PROCEDURE — 6370000000 HC RX 637 (ALT 250 FOR IP): Performed by: FAMILY MEDICINE

## 2023-03-16 PROCEDURE — 80048 BASIC METABOLIC PNL TOTAL CA: CPT

## 2023-03-16 PROCEDURE — 80202 ASSAY OF VANCOMYCIN: CPT

## 2023-03-16 PROCEDURE — 82962 GLUCOSE BLOOD TEST: CPT

## 2023-03-16 PROCEDURE — 6360000002 HC RX W HCPCS: Performed by: FAMILY MEDICINE

## 2023-03-16 PROCEDURE — 84100 ASSAY OF PHOSPHORUS: CPT

## 2023-03-16 PROCEDURE — 85027 COMPLETE CBC AUTOMATED: CPT

## 2023-03-16 PROCEDURE — S5553 INSULIN LONG ACTING 5 U: HCPCS | Performed by: FAMILY MEDICINE

## 2023-03-16 PROCEDURE — 83735 ASSAY OF MAGNESIUM: CPT

## 2023-03-16 PROCEDURE — 6370000000 HC RX 637 (ALT 250 FOR IP): Performed by: NURSE PRACTITIONER

## 2023-03-16 PROCEDURE — 2060000000 HC ICU INTERMEDIATE R&B

## 2023-03-16 RX ORDER — HALOPERIDOL 5 MG/ML
2 INJECTION INTRAMUSCULAR EVERY 6 HOURS PRN
Status: DISCONTINUED | OUTPATIENT
Start: 2023-03-16 | End: 2023-03-16 | Stop reason: ALTCHOICE

## 2023-03-16 RX ORDER — MECOBALAMIN 5000 MCG
5 TABLET,DISINTEGRATING ORAL DAILY
Status: DISCONTINUED | OUTPATIENT
Start: 2023-03-16 | End: 2023-03-18 | Stop reason: HOSPADM

## 2023-03-16 RX ORDER — HEPARIN SODIUM 10000 [USP'U]/ML
5000 INJECTION, SOLUTION INTRAVENOUS; SUBCUTANEOUS EVERY 8 HOURS
Status: DISCONTINUED | OUTPATIENT
Start: 2023-03-16 | End: 2023-03-18 | Stop reason: HOSPADM

## 2023-03-16 RX ORDER — HYDRALAZINE HYDROCHLORIDE 20 MG/ML
10 INJECTION INTRAMUSCULAR; INTRAVENOUS EVERY 4 HOURS PRN
Status: DISCONTINUED | OUTPATIENT
Start: 2023-03-16 | End: 2023-03-18 | Stop reason: HOSPADM

## 2023-03-16 RX ORDER — DIVALPROEX SODIUM 125 MG/1
125 CAPSULE, COATED PELLETS ORAL EVERY 8 HOURS SCHEDULED
Status: DISCONTINUED | OUTPATIENT
Start: 2023-03-16 | End: 2023-03-18 | Stop reason: HOSPADM

## 2023-03-16 RX ADMIN — TRAZODONE HYDROCHLORIDE 25 MG: 50 TABLET ORAL at 20:41

## 2023-03-16 RX ADMIN — INSULIN GLARGINE-YFGN 6 UNITS: 100 INJECTION, SOLUTION SUBCUTANEOUS at 20:48

## 2023-03-16 RX ADMIN — DIVALPROEX SODIUM 125 MG: 125 CAPSULE, COATED PELLETS ORAL at 20:40

## 2023-03-16 RX ADMIN — HEPARIN SODIUM 5000 UNITS: 10000 INJECTION INTRAVENOUS; SUBCUTANEOUS at 14:33

## 2023-03-16 RX ADMIN — SEVELAMER CARBONATE 800 MG: 800 TABLET, FILM COATED ORAL at 11:42

## 2023-03-16 RX ADMIN — Medication 5 MG: at 20:41

## 2023-03-16 RX ADMIN — ACETAMINOPHEN 650 MG: 325 TABLET ORAL at 20:41

## 2023-03-16 RX ADMIN — DIVALPROEX SODIUM 125 MG: 125 CAPSULE, COATED PELLETS ORAL at 14:32

## 2023-03-16 RX ADMIN — POLYETHYLENE GLYCOL 3350 17 G: 17 POWDER, FOR SOLUTION ORAL at 17:00

## 2023-03-16 RX ADMIN — SEVELAMER CARBONATE 800 MG: 800 TABLET, FILM COATED ORAL at 16:28

## 2023-03-16 RX ADMIN — SENNOSIDES 17.2 MG: 8.6 TABLET, FILM COATED ORAL at 20:41

## 2023-03-16 ASSESSMENT — PAIN SCALES - GENERAL: PAINLEVEL_OUTOF10: 8

## 2023-03-16 ASSESSMENT — PAIN DESCRIPTION - LOCATION: LOCATION: ABDOMEN

## 2023-03-16 NOTE — PROGRESS NOTES
Comprehensive Nutrition Assessment    Type and Reason for Visit:  Initial, Positive Nutrition Screen    Nutrition Recommendations/Plan:   When diet advances, will start Nepro renal supplement BID and Jono wound healing supplement BID to help meet increased nutritional needs from wound healing and known losses from dialysis. Malnutrition Assessment:  Malnutrition Status: Moderate malnutrition (03/16/23 1316)    Context:  Chronic Illness     Findings of the 6 clinical characteristics of malnutrition:  Energy Intake:  75% or less estimated energy requirements for 1 month or longer  Weight Loss:  Unable to assess (d/t fluid shifts ; hx of HD)     Body Fat Loss:  Mild body fat loss Orbital, Triceps   Muscle Mass Loss:  Mild muscle mass loss Temples (temporalis), Clavicles (pectoralis & deltoids)  Fluid Accumulation:  No significant fluid accumulation     Strength:  Not Performed    Nutrition Assessment:    Patient adm w/ AMS and wound infection ; concern for sepsis ; ammonia levels WNL ; hx of ESRD w/ HD ; hx of DM and depression ; hx of FTT and moderate malnutrition ; pt does meet criteria for moderate malnutrition ; noted delirium and confusion ;  will start ONS when diet advances    Nutrition Related Findings:    -I&Os (-1.1 L), 1+ edema, A&O x 1, muscle/fat wasting, HD ; Wound Type: Multiple, Open Wounds, Stage III, Stage IV (wounds x 5)       Current Nutrition Intake & Therapies:    Average Meal Intake: 26-50%     ADULT DIET; Regular  Diet NPO    Anthropometric Measures:  Height: 5' 1\" (154.9 cm)  Ideal Body Weight (IBW): 105 lbs (48 kg)    Admission Body Weight: 195 lb (88.5 kg) (3/14, no method)  Current Body Weight: 185 lb (83.9 kg) (3/16, bedscale), 176.2 % IBW.  Weight Source: Bed Scale  Current BMI (kg/m2): 35  Usual Body Weight:  (UTO ; EMR shows past weights of 189# actual on 2/18/23 and 195# bedscale on 2/16/23)                       BMI Categories: Obese Class 2 (BMI 35.0 -39.9)    Estimated Daily Nutrient Needs:  Energy Requirements Based On: Formula  Weight Used for Energy Requirements: Current  Energy (kcal/day): 5754-6948 (REE 1283 x 1.3 SF)  Weight Used for Protein Requirements: Ideal  Protein (g/day):  (2.0-2.2g/kg IBW)  Method Used for Fluid Requirements: Standard Renal  Fluid (ml/day): per renal    Nutrition Diagnosis:   Moderate malnutrition, In context of chronic illness related to catabolic illness (hx of ESRD w/ HD) as evidenced by poor intake prior to admission, mild muscle loss, mild loss of subcutaneous fat    Nutrition Interventions:   Food and/or Nutrient Delivery: Continue NPO  Nutrition Education/Counseling: Education not indicated  Coordination of Nutrition Care: Continue to monitor while inpatient       Goals:  Previous Goal Met: Progressing toward Goal(s)  Goals: Meet at least 75% of estimated needs, by next RD assessment       Nutrition Monitoring and Evaluation:   Behavioral-Environmental Outcomes: None Identified  Food/Nutrient Intake Outcomes: Diet Advancement/Tolerance  Physical Signs/Symptoms Outcomes: Biochemical Data, Chewing or Swallowing, GI Status, Fluid Status or Edema, Hemodynamic Status, Meal Time Behavior, Nutrition Focused Physical Findings, Skin, Weight    Discharge Planning:     Too soon to determine     Carley Medina RD, LD  Contact: [phone]

## 2023-03-16 NOTE — CONSULTS
Department of Psychiatry  Behavioral Health Consult    REASON FOR CONSULT:  Delirium and confusion     CONSULTING PHYSICIAN: Dr Casillas     History obtained from: Chart review and family     HISTORY OF PRESENT ILLNESS:      The patient is a 72 y.o. female with significant past psychiatric history of depression who presents with wound infection and alteration in her mental status. She resides at [facility], was sent in due to AMS. She is ESRD, IDDM, orthostatic hypotension, recent fx surgery to right lower leg, has chronic sacral decubitus, and purulent wound to right leg above the knee she was admitted for concern of sepsis. Psychiatry was consulted for delirium and confusion.     On assessment today the patient is resting in her bed, her adult son at bedside. She is agitated, demonstrating clear hyperactive delirium, she is confused on this encounter, speaking about hallucinations and things going on around her. At one point last night she was very confused and disoriented, believing she was at home, and interacting with unseen others. She is having formed hallucinations of people which is consistent with organic cause, and again most likely 2/2 her delirium from illness. She is not able to participate in any gainful psychiatric assessment due to her agitation, she demonstrates a very low threshold for stimulation on this encounter. Her son reports that this has happened before when she is ill. He tells me that she did not sleep last night, and was disoriented.       Psychiatric history:  Depression and anxiety prescribed lexapro and buspar    Substance use hx:  No reported EToH or illicit substance use.     Personal, family social history:  Limited, she has good family support her son is present at the bedside, she has grandchildren and is residing at [facility].             Past Medical History:        Diagnosis Date    Anemia in chronic kidney disease     Anxiety and depression     Arthritis     Chronic pain  syndrome     COVID-19 05/2020    COVID-19     Decreased dorsalis pedis pulse 10/25/2022    Diabetes mellitus ([facility] [address]     Dysphagia, oral phase     ESRD on hemodialysis ([facility] [address] 10/25/2022    GERD (gastroesophageal reflux disease)     Hemodialysis patient ([facility] [address]     M-W-F    Hyperkalemia     Hypertension     Hypertensive kidney disease with stage 4 chronic kidney disease (HCC)     Insomnia     Kidney stones     MDD (major depressive disorder)     Moderate protein-calorie malnutrition (HCC)     Morbid obesity (HCC)     Muscle weakness (generalized)     Obesity     Pressure injury of sacral region, stage 3 ([facility] [address] 10/25/2022    Pressure ulcer of sacral region     Sacral pressure ulcer 08/03/2021    stage 4    Unspecified osteoarthritis, unspecified site     Weakness generalized        Past Surgical History:        Procedure Laterality Date    ABDOMEN SURGERY N/A 5/4/2020    SACRAL WOUND DEBRIDEMENT CALL   WITH TIME AVAIL AM performed by Geoff Arriaza MD at [address]  x3    CHOLECYSTECTOMY  3/31/16    Laparoscopic-Dr. Marco Lee  2011     for kidney stones    DIALYSIS FISTULA CREATION Left 8/5/2021    INSERTION FISTULA LEFT ARM performed by Elenita Casey MD at [address] Right 11/18/2021    REVISION AV FISTULA LEFT ARM performed by Elenita Casey MD at [place] [address] Right 11/4/2022    RIGHT DISTAL FEMUR OPEN REDUCTION INTERNAL FIXATION ++SYNTHES++ performed by Sohail Villa MD at [place] [address]  2009     right     UPPER GASTROINTESTINAL ENDOSCOPY  2.18.15    Dr. Dennie Knudsen Findings: Mild Gastrits and Duodenitis, 2cm Hiatal Hernia    UPPER GASTROINTESTINAL ENDOSCOPY N/A 5/8/2020    EGD CONTROL HEMORRHAGE performed by Geoff Arriaza MD at [address] [facility] N/A 5/22/2020    EGD BEDSIDE performed by Merrill Em MD at [place] [address] 5/6/2021    INSERTION TUNNELED DIALYSIS CATHETER performed by Janifer Hashimoto, MD at [address] Left 11/4/2022    LEFT DISTAL RADIUS OPEN REDUCTION INTERNAL FIXATION    ++SYNTHES++ performed by Manas Reeves MD at [facility] OR       Medications Prior to Admission:   Medications Prior to Admission: traMADol (ULTRAM) 50 MG tablet, Take 50 mg by mouth every 8 hours as needed for Pain.  vitamin C (ASCORBIC ACID) 500 MG tablet, Take 500 mg by mouth 2 times daily  glucagon, rDNA, 1 MG injection, Inject 1 mg into the muscle as needed for Low blood sugar  ferric citrate (AURYXIA) 1  MG(Fe) TABS tablet, Take 420 mg by mouth 3 times daily (with meals)  benzonatate (TESSALON) 100 MG capsule, Take 100 mg by mouth 3 times daily as needed for Cough  bisacodyl (DULCOLAX) 10 MG suppository, Place 10 mg rectally daily as needed for Constipation  glucose (GLUTOSE) 40 % GEL, Take 15 g by mouth as needed (hypoglycemia)  guaiFENesin 200 MG/5ML LIQD, Take 200 mg by mouth every 6 hours as needed (cough)  insulin lispro, 1 Unit Dial, (HUMALOG/ADMELOG) 100 UNIT/ML SOPN, Inject 0-10 Units into the skin 3 times daily (before meals) *Per Sliding Scale* 160-200 = 2 units 201-250 = 4 units 251-300 = 6 units 301-350 = 8 units 351-400 = 10 units + notify MD  lactulose (CHRONULAC) 10 GM/15ML solution, Take 10 g by mouth daily as needed (constipation)  busPIRone (BUSPAR) 10 MG tablet, Take 10 mg by mouth daily  polyethylene glycol (GLYCOLAX) 17 g packet, Take 17 g by mouth daily as needed for Constipation  oxyCODONE-acetaminophen (PERCOCET) 5-325 MG per tablet, Take 1 tablet by mouth every 6 hours as needed for Pain.  epoetin derek-epbx (RETACRIT) 68932 UNIT/ML SOLN injection, Inject 10,000 Units into the skin See Admin Instructions Given Tuesday,Thursday,Saturday  senna (SENOKOT) 8.6 MG tablet, Take 2 tablets by mouth 2 times daily  traZODone (DESYREL) 50 MG tablet, Take 50 mg by mouth nightly  acetaminophen (TYLENOL) 325 MG tablet, Take 650 mg by mouth every 4 hours as needed for Pain or Fever  vitamin D (CHOLECALCIFEROL) 25 MCG (1000 UT) TABS tablet, Take 1,000 Units by mouth daily  ondansetron (ZOFRAN) 4 MG tablet, Take 4 mg by mouth every 4 hours as needed for Nausea or Vomiting  insulin glargine (LANTUS) 100 UNIT/ML injection vial, Inject 6 Units into the skin nightly  midodrine (PROAMATINE) 5 MG tablet, Take 15 mg by mouth See Admin Instructions Given Monday,Wednesday,Friday  metoprolol succinate (TOPROL XL) 25 MG extended release tablet, Take 1 tablet by mouth daily  escitalopram (LEXAPRO) 10 MG tablet, Take 10 mg by mouth daily  gabapentin (NEURONTIN) 300 MG capsule, Take 300 mg by mouth daily. Allergies:  Patient has no known allergies.     FAMILY/SOCIAL HISTORY:  Family History   Problem Relation Age of Onset    Cancer Sister      Social History     Socioeconomic History    Marital status:      Spouse name: Not on file    Number of children: Not on file    Years of education: Not on file    Highest education level: Not on file   Occupational History    Not on file   Tobacco Use    Smoking status: Former     Packs/day: 1.00     Years: 30.00     Pack years: 30.00     Types: Cigarettes     Quit date: 2011     Years since quittin.5    Smokeless tobacco: Never   Vaping Use    Vaping Use: Never used   Substance and Sexual Activity    Alcohol use: No    Drug use: No    Sexual activity: Not Currently   Other Topics Concern    Not on file   Social History Narrative    Not on file     Social Determinants of Health     Financial Resource Strain: Not on file   Food Insecurity: Not on file   Transportation Needs: Not on file   Physical Activity: Not on file   Stress: Not on file   Social Connections: Not on file   Intimate Partner Violence: Not on file   Housing Stability: Not on file       REVIEW OF SYSTEMS    Constitutional: [] fever  [] chills  [] weight loss  []weakness [] Other:  Eyes:  [] photophobia  [] discharge [] acuity change   [] Diplopia [] Other:  HENT:  [] sore throat  [] ear pain [] Tinnitus   [] Other  Respiratory:  [] Cough  [] Shortness of breath   [] Sputum   [] Other:   Cardiac: []Chest pain   []Palpitations []Edema  []PND  [] Other:  GI:  []Abdominal pain   []Nausea  []Vomiting  []Diarrhea  [] Other:  :  [] Dysuria   []Frequency  []Hematuria  []Discharge  [] Other:  Possible Pregnancy: []Yes   []No   LMP:   Musculoskeletal:  []Back pain  []Neck pain  []Recent Injury   Skin:  []Rash  [] Itching  [] Other:  Neurologic:  [] Headache  [] Focal weakness  [] Sensory changes []Other:  Endocrine:  [] Polyuria  [] Polydipsia  [] Hair Loss  [] Other:  Lymphatic:   [] Swollen glands   Psychiatric:  As per HPI      All other systems negative except as marked or mentioned/indicated in the HPI. Falguni Ruffing      PHYSICAL EXAM:  Vitals:  BP (!) 112/54   Pulse (!) 270   Temp 97.2 °F (36.2 °C)   Resp 16   Ht 5' 1\" (1.549 m)   Wt 185 lb 9.6 oz (84.2 kg)   SpO2 97%   BMI 35.07 kg/m²      Neuro Exam:   Deferred patient unable to participate     Mental Status Examination:    Level of consciousness:  within normal limits   Appearance:  ill-appearing, hospital attire, and lying in bed  Behavior/Motor:  agitated  Attitude toward examiner:  cooperative  Speech:  pressured   Mood: irritable and labile  Affect:  mood congruent  Thought processes:  confused   Thought content:  confused, with formed visual hallucinations of people   Cognition:  self   Concentration distractible   Memory KWASI  Insight poor   Judgement poor   Fund of Knowledge KWASI      DIAGNOSIS:  Hyperactive delirium       LABS: REVIEWED TODAY:  Recent Labs     03/14/23  1225 03/15/23  0748 03/16/23  0739   WBC 12.7* 7.1 7.6   HGB 10.1* 9.8* 9.9*    163 192     Recent Labs     03/14/23  1225 03/15/23  0748 03/16/23  0739    135 139   K 3.9 4.0 4.3   CL 94* 97* 99   CO2 27 27 30*   BUN 19 24* 15   CREATININE 3.4* 4.5* 3.4*   GLUCOSE 120* 88 89     Recent Labs     03/14/23  1225 03/15/23  0748 BILITOT 0.5 0.4   ALKPHOS 63 58   AST 14 11   ALT 8 6     No results found for: LABAMPH, BARBSCNU, LABBENZ, CANNAB, COCAINESCRN, LABMETH, OPIATESCREENURINE, PHENCYCLIDINESCREENURINE, PPXUR, ETOH  Lab Results   Component Value Date/Time    TSH 1.945 08/16/2013 05:00 AM     No results found for: LITHIUM  No results found for: VALPROATE, CBMZ  No results found for: LITHIUM, VALPROATE    FURTHER LABS ORDERED :      Radiology   CT ABDOMEN PELVIS WO CONTRAST Additional Contrast? None    Result Date: 2/15/2023  EXAMINATION: CT OF THE ABDOMEN AND PELVIS WITHOUT CONTRAST 2/15/2023 2:43 pm TECHNIQUE: CT of the abdomen and pelvis was performed without the administration of intravenous contrast. Multiplanar reformatted images are provided for review. Automated exposure control, iterative reconstruction, and/or weight based adjustment of the mA/kV was utilized to reduce the radiation dose to as low as reasonably achievable. COMPARISON: 04/12/2022 abdomen/pelvis CT. HISTORY: ORDERING SYSTEM PROVIDED HISTORY: ams TECHNOLOGIST PROVIDED HISTORY: Reason for exam:->ams Additional Contrast?->None Decision Support Exception - unselect if not a suspected or confirmed emergency medical condition->Emergency Medical Condition (MA) FINDINGS: Lower Chest: There are patchy airspace densities inferior right upper lobe, lingula and bilateral lower lobes with small to moderate bilateral pleural effusions. There are multiple calcified hilar and subcarinal lymph nodes. Cardiac size is enlarged. There is atherosclerotic calcification of the coronary arteries. There is calcification of the mitral valve annulus. Organs: There are multiple calcified splenic granulomata. The liver is unremarkable. There are surgical clips in the gallbladder fossa. The pancreas and adrenal glands are unremarkable. There is bilateral renal atrophy. There are multiple small bilateral renal cysts.   There is a 16 mm indeterminate left renal lesion midpole posteriorly, not significantly changed in size. GI/Bowel: The stomach is not distended and therefore suboptimally evaluated. There are multiple surgical clips right upper quadrant adjacent to the gastric antrum/proximal duodenum. The small bowel is normal.  There is diverticulosis involving the left colon. The rectum is distended with stool. No evidence of appendicitis. Pelvis: The urinary bladder is decompressed however there is diffuse bladder wall thickening. There are mild medial calcifications along with a hyperdense uterine lesion lower uterine segment posteriorly, likely a fibroid. Peritoneum/Retroperitoneum: There is extensive atherosclerotic calcification of the large abdominal and pelvic arteries. No abdominal aortic aneurysm. Bones/Soft Tissues: There is slight anterolisthesis of L4 on L5. And intramedullary brien is visualized within the proximal right femur. There is multilevel lower thoracic disc disease. There is a left of midline anterior abdominal wall fat containing hernia. There is a decubitus ulcer and soft tissue gas to the level of the inferior sacrum and coccyx with erosive changes at that location. The appearance however is similar. No discrete focal fluid collection is identified. Inferior right upper lobe, lingular and bilateral lower lobe infiltrates consistent with atelectasis and pneumonia. There are bilateral pleural effusions. Indeterminate 16 mm left renal lesion. A solid lesion including secondary to neoplastic disease cannot be excluded. Further evaluation with ultrasound or MRI is suggested. Diffuse urinary bladder wall thickening. Cystitis is suspected. Cardiomegaly and atherosclerosis. Diverticulosis but no evidence of diverticulitis. Stool distended rectum suggesting fecal impaction. Decubitus ulcer with erosive change inferior sacrum/coccyx suspicious for osteomyelitis. The appearance however is similar.      XR KNEE RIGHT (3 VIEWS)    Result Date: 3/14/2023  EXAMINATION: THREE XRAY VIEWS OF THE RIGHT KNEE 3/14/2023 1:29 pm COMPARISON: None. HISTORY: ORDERING SYSTEM PROVIDED HISTORY: wound; assess for soft tissue gas TECHNOLOGIST PROVIDED HISTORY: Reason for exam:->wound; assess for soft tissue gas What reading provider will be dictating this exam?->CRC FINDINGS: The bones are demineralized. Intramedullary brien and fixation screws noted in the visualized femur. No acute fractures or hardware complications. There is soft tissue swelling with questionable pockets of gas in the lateral aspect. No acute bony abnormalities or hardware complications. Soft tissue swelling and questionable soft tissue gas along the lateral aspect. CT HEAD WO CONTRAST    Result Date: 3/14/2023  EXAMINATION: CT OF THE HEAD WITHOUT CONTRAST  3/14/2023 3:36 pm TECHNIQUE: CT of the head was performed without the administration of intravenous contrast. Automated exposure control, iterative reconstruction, and/or weight based adjustment of the mA/kV was utilized to reduce the radiation dose to as low as reasonably achievable. COMPARISON: CT head without contrast, 02/15/2023. HISTORY: ORDERING SYSTEM PROVIDED HISTORY: AMS TECHNOLOGIST PROVIDED HISTORY: Reason for exam:->AMS Has a \"code stroke\" or \"stroke alert\" been called? ->No Decision Support Exception - unselect if not a suspected or confirmed emergency medical condition->Emergency Medical Condition (MA) What reading provider will be dictating this exam?->CRC FINDINGS: BRAIN/VENTRICLES: No mass effect, edema or hemorrhage is seen. Mild volume loss is seen in the cerebrum with mild chronic microvascular ischemic changes. No hydrocephalus or extra-axial fluid is seen. ORBITS: The visualized portion of the orbits demonstrate no acute abnormality. SINUSES: The visualized paranasal sinuses and mastoid air cells demonstrate no acute abnormality. SOFT TISSUES/SKULL:  No acute abnormality of the visualized skull or soft tissues.      No acute intracranial abnormality. CT HEAD WO CONTRAST    Result Date: 2/15/2023  EXAMINATION: CT OF THE HEAD WITHOUT CONTRAST  2/15/2023 2:43 pm TECHNIQUE: CT of the head was performed without the administration of intravenous contrast. Automated exposure control, iterative reconstruction, and/or weight based adjustment of the mA/kV was utilized to reduce the radiation dose to as low as reasonably achievable. COMPARISON: 11/03/2022 HISTORY: ORDERING SYSTEM PROVIDED HISTORY: ams TECHNOLOGIST PROVIDED HISTORY: Reason for exam:->ams Has a \"code stroke\" or \"stroke alert\" been called? ->No Decision Support Exception - unselect if not a suspected or confirmed emergency medical condition->Emergency Medical Condition (MA) FINDINGS: BRAIN/VENTRICLES: There is no acute intracranial hemorrhage, mass effect or midline shift. No abnormal extra-axial fluid collection. The gray-white differentiation is maintained without evidence of an acute infarct. There is prominence of the ventricles and sulci due to global parenchymal volume loss. There are nonspecific areas of hypoattenuation within the periventricular and subcortical white matter, which likely represent chronic microvascular ischemic change. ORBITS: The visualized portion of the orbits demonstrate no acute abnormality. SINUSES: The visualized paranasal sinuses and mastoid air cells demonstrate no acute abnormality. SOFT TISSUES/SKULL: No acute abnormality of the visualized skull or soft tissues. No acute intracranial abnormality. CT CHEST WO CONTRAST    Result Date: 2/16/2023  EXAMINATION: CT OF THE CHEST WITHOUT CONTRAST 2/16/2023 7:35 am TECHNIQUE: CT of the chest was performed without the administration of intravenous contrast. Multiplanar reformatted images are provided for review. Automated exposure control, iterative reconstruction, and/or weight based adjustment of the mA/kV was utilized to reduce the radiation dose to as low as reasonably achievable. COMPARISON: 02/09/2023 HISTORY: ORDERING SYSTEM PROVIDED HISTORY: Pneumonia/pleural effusion TECHNOLOGIST PROVIDED HISTORY: Reason for exam:->pneumonia/pleural effusion What reading provider will be dictating this exam?->CRC FINDINGS: Mediastinum: Diffuse atherosclerotic calcification involves the thoracic aorta. Atherosclerotic coronary artery calcification is noted which may be seen in association with coronary artery stenosis. There are extensive mediastinal calcifications again noted and bilateral hilar calcifications consistent with remote granulomatous disease. Mitral annulus calcification is noted. No pathologically enlarged mediastinal lymph nodes noted. Lungs/pleura: There are moderate bilateral pleural effusions which have increased from the prior study. There is new right upper lobe perihilar infiltrate. To lesser extent there is patchy airspace infiltrate in the right lower lobe as well. There is atelectasis versus infiltrate in the left upper lobe abutting the major fissure. Dependent compressive atelectatic changes involve both lower lobes. Upper Abdomen: Bilateral renal atrophy again noted. Atherosclerotic calcification involves the abdominal aorta and its visceral branches. There are surgical clips in the upper abdomen. Soft Tissues/Bones: Unremarkable     1. Moderate bilateral pleural effusions which have increased. 2. New bilateral lung infiltrates, particularly right lung. CT KNEE RIGHT WO CONTRAST    Result Date: 3/14/2023  EXAMINATION: CT OF THE RIGHT KNEE WITHOUT CONTRAST 3/14/2023 9:46 pm TECHNIQUE: CT of the right knee was performed without the administration of intravenous contrast.  Multiplanar reformatted images are provided for review. Automated exposure control, iterative reconstruction, and/or weight based adjustment of the mA/kV was utilized to reduce the radiation dose to as low as reasonably achievable.  COMPARISON: Radiographs of 8 hours prior [address] PROVIDED HISTORY: Fracture TECHNOLOGIST PROVIDED HISTORY: metal reducing Reason for exam:->Fracture What reading provider will be dictating this exam?->CRC FINDINGS: Bones: Osteopenia. Chronic fracture distal femoral metadiaphysis with intact traversing intramedullary and lateral screw plate ORIF hardware with incomplete osseous bridging through medial metadiaphysis region. No acute fracture. No aggressive appearing osseous abnormality or periostitis. Soft Tissue: No significant soft tissue edema or fluid collections. Cutaneous scarring or mild ulceration over the lateral supra geniculate knee soft tissues, likely the result of scarring from lateral screw plate fixation. The region of ulceration measures 2.4 x 1.4 cm. No significant tracking subcutaneous emphysema. Joint: No significant degenerative changes. No osseous erosions. 1. No acute disease. 2. Chronic fracture distal femoral metadiaphysis with intact traversing intramedullary and lateral screw plate ORIF hardware. Incomplete osseous bridging noted. 3. Cutaneous scarring or mild ulceration over the lateral supra geniculate knee soft tissues, likely the result of scarring from lateral screw plate fixation. The region of ulceration measures 2.4 x 1.4 cm. No significant tracking subcutaneous emphysema. RECOMMENDATIONS: Careful clinical correlation and follow up recommended. CT ABDOMEN PELVIS W IV CONTRAST Additional Contrast? None    Result Date: 3/14/2023  EXAMINATION: CT OF THE ABDOMEN AND PELVIS WITH CONTRAST 3/14/2023 1:36 pm TECHNIQUE: CT of the abdomen and pelvis was performed with the administration of intravenous contrast. Multiplanar reformatted images are provided for review. Automated exposure control, iterative reconstruction, and/or weight based adjustment of the mA/kV was utilized to reduce the radiation dose to as low as reasonably achievable.  COMPARISON: 02/15/2023 HISTORY: ORDERING SYSTEM PROVIDED HISTORY: assess sacral wound TECHNOLOGIST PROVIDED HISTORY: Additional Contrast?->None Reason for exam:->assess sacral wound Decision Support Exception - unselect if not a suspected or confirmed emergency medical condition->Emergency Medical Condition (MA) What reading provider will be dictating this exam?->CRC FINDINGS: Lower Chest: Visualized portion the heart is enlarged. Compared to the prior exam, bilateral pleural effusions have decreased with a trace amount remaining on the right and small amount on the left. Left lower lobe segmental atelectasis is noted. Organs:  Liver enhances normally without evidence of intrahepatic biliary ductal dilatation. A right posterior hepatic dome 14 mm cyst with calcification is stable. Stable prominence of the central intrahepatic bile ducts, likely related to post cholecystectomy state. Status post cholecystectomy. Stable atrophy of the pancreatic parenchyma. The spleen and adrenal glands are unremarkable. The kidneys are stable in appearance with cortical thinning and bilateral cysts. No hydronephrosis is seen. GI/Bowel: Stomach and duodenal sweep demonstrate no acute abnormality. Embolization material is seen along the distal stomach, which is stable. There is no evidence of bowel obstruction. No evidence of abnormal bowel wall thickening or distension. The appendix is visualized and is unremarkable. No evidence of acute appendicitis. There is diverticulosis without evidence of diverticulitis. The rectum is distended with a large amount of hardened stool. There is mild distal rectal wall thickening with perirectal fat stranding. Pelvis:  Bladder is unremarkable in appearance. The uterus and adnexa are without acute abnormality. Peritoneum/Retroperitoneum: No evidence of ascites or free air. No evidence of lymphadenopathy. Aorta is normal in caliber.  Bones/Soft Tissues: A large sacral decubitus ulcer is again identified, which extends into the coccyx with stable appearance of the underlying sacral coccygeal bone. There has been interval placement of high density material within the ulcer bed. The associated indurated thickened soft tissue is not significantly changed. No loculated fluid collection is identified. No new acute osseous abnormality. Interval placement of high density material within the ulcer bed. Otherwise, no significant change in the sacral decubitus ulcer with stable appearance of the underlying bone. No evidence of abscess. Distended rectum containing hardened stool and mild wall thickening suggestive of fecal impaction. No evidence of bowel obstruction. Interval decrease in pleural effusions with trace right and small left remaining. XR CHEST PORTABLE    Result Date: 3/14/2023  EXAMINATION: ONE XRAY VIEW OF THE CHEST 3/14/2023 1:29 pm COMPARISON: None. HISTORY: ORDERING SYSTEM PROVIDED HISTORY: assess for pneumonia TECHNOLOGIST PROVIDED HISTORY: Reason for exam:->assess for pneumonia What reading provider will be dictating this exam?->CRC FINDINGS: The heart is enlarged. There are benign calcified lymph nodes seen within the mediastinum and margret as noted on the prior CT scan. Minimal airspace disease seen within the right upper lobe and right lower lobe which could represent continued clearing of the previously noted dense multifocal pneumonia. The left lung base is poorly visualized due to the cardiomegaly and chest rotation. A lead small left pleural effusion cannot be excluded. Vague patchy airspace disease seen within the right upper lobe and right lower lobe improved when compared with the prior CT scan of 02/16/2023 Marked cardiomegaly Possible small left pleural effusion     XR CHEST PORTABLE    Result Date: 2/15/2023  EXAMINATION: ONE XRAY VIEW OF THE CHEST 2/15/2023 12:29 pm COMPARISON: 02/09/2023. HISTORY: ORDERING SYSTEM PROVIDED HISTORY: ams TECHNOLOGIST PROVIDED HISTORY: Reason for exam:->ams FINDINGS: The patient is rotated to the left. The cardiac silhouette appears enlarged. There are findings related to a history of granulomatous disease. Pulmonary infiltrates are again seen on the right and have increased in the upper lung since the prior exam.  The left hemidiaphragm is not well visualized and pleural and/or parenchymal disease in the left base is not excluded. Somewhat limited exam.  Enlarged cardiac silhouette and findings related to a history of granulomatous disease. Pulmonary infiltrates on the right, increased in the upper lung since 02/09/2023. Possible left basilar pleural and/or parenchymal disease. EKG: TRACING REVIEWED     RECOMMENDATIONS:    The patient's diagnosis, treatment plan, medication management were formulated after patient was seen directly by the attending physician and myself and all relevant documentation was reviewed. Risk, benefit, side effects, possible outcomes of the medication and alternatives discussed with the patient and the patient demonstrated understanding. The patient was also educated that the outcome of treatment will depend on the medication compliance as directed by the prescribers along with regular follow-up, compliance with the labs and other work-up, as clinically indicated. Okay to continue patient BuSpar and Lexapro  We will add depakote sprinkle 125 mg 3 times daily x 7 days to help reduce agitation and irritability and reduce confusion  Melatonin 5 mg daily at 1800 x 10 days to reset the sleep wake cycle and reduce confusion    Delirium may take several weeks to fully resolve    Recommend avoiding any Qtc prolonging medications as Qtc is prolonged, this does appear to be chronic in nature. Discussed with the treating physician/ team about the patient and treatment plan  Reviewed the chart    Discussed treatment and dx with pt son. Patient does not meet criteria for inpatient psychiatric admission. Thanks for the consult.      Electronically signed by Lynne Arroyo Casi Aranda, PABLO - CNP on 3/16/2023 at 11:20 AM    NOTE: This report was transcribed using voice recognition software. Every effort was made to ensure accuracy; however, inadvertent computerized transcription errors may be present.

## 2023-03-16 NOTE — PROGRESS NOTES
This RN attempted to assess patient and give morning medications, however at this time the patient is refusing all care including assessment and medications. Attempts made to reorient patient but patient remains confused. Will re-attempt assessment at a later time.

## 2023-03-16 NOTE — PROGRESS NOTES
[facility]  Nephrology Progress Note    Patient's Name: Vick Mcgowan    History of Present Illness from 3/15 Consult Note:  \"Marisela Fleming is a 67 y.o. female with a past medical history of ESRD on hemodialysis, COVID, hypertension, and sacral pressure ulcer. She presented to the ED on 3/14 with altered mental status. Vital signs on 3/14 include temperature 98.2, respirations 20, pulse 98, /74, and she was 98% SPO2. Lab data on 3/14 includes creatinine 3.4, lactic acid 2.7, WBCs 12.7 K, and hemoglobin 10.1. She had a chest x-ray on 3/14 which showed vague patchy airspace disease seen within the right upper lobe and right lower lobe. X-ray right knee 3/14 no acute bony abnormalities or hardware complications, soft tissue swelling and questionable soft tissue gas along the lateral aspect. CT of the head no acute abnormality. We were consulted to see the patient for dialysis. Patient is known to our service and dialyzes at the dialysis den at [facility]. At present, patient was seen and examined. She reports that she feels hungry this morning. She denies any chest pain or shortness of breath. She denies any abdominal pain, nausea, vomiting, or diarrhea. She denies any headaches or dizziness. She denies any fevers or chills. \"    Subjective:    3/16: Patient was seen and examined. She reports that she feels okay. She denies any chest pain or shortness of breath. She denies any abdominal pain or nausea.     PMH:    Past Medical History:   Diagnosis Date    Anemia in chronic kidney disease     Anxiety and depression     Arthritis     Chronic pain syndrome     COVID-19 05/2020    COVID-19     Decreased dorsalis pedis pulse 10/25/2022    Diabetes mellitus ([address]     Dysphagia, oral phase     ESRD on hemodialysis ([address] 10/25/2022    GERD (gastroesophageal reflux disease)     Hemodialysis patient ([facility]     M-W-F    Hyperkalemia     Hypertension     Hypertensive kidney disease with stage 4 chronic kidney disease ([facility]     Insomnia     Kidney stones     MDD (major depressive disorder)     Moderate protein-calorie malnutrition (HCC)     Morbid obesity (HCC)     Muscle weakness (generalized)     Obesity     Pressure injury of sacral region, stage 3 ([address] 10/25/2022    Pressure ulcer of sacral region     Sacral pressure ulcer 08/03/2021    stage 4    Unspecified osteoarthritis, unspecified site     Weakness generalized        Patient Active Problem List   Diagnosis    Neurologic gait dysfunction    Diabetes mellitus ([address]    Hypertension    Arthritis    Knee problem    Anemia due to stage 3 chronic kidney disease    Primary osteoarthritis of both knees    Moderate protein-calorie malnutrition (HCC)    Pressure injury of sacral region, stage 4 (HCC)    Pressure injury of right hip, stage 3 (HCC)    Left bundle branch block    Moderate obesity    Failure to thrive in adult    Pressure injury of sacral region, stage 3 (HCC)    Decreased dorsalis pedis pulse    ESRD on hemodialysis ([address]    Closed bicondylar fracture of distal femur, right, initial encounter ([facility] [address]    Other fracture of right femur, initial encounter for closed fracture ([address]    Closed fracture of distal ends of left radius and ulna    Closed bicondylar fracture of distal end of right femur ([address]    Pneumonia due to infectious organism, unspecified laterality, unspecified part of lung    Pneumonia due to organism    Wound infection    Sepsis ([address]       Diet:    ADULT DIET;  Regular  Diet NPO    Meds:     busPIRone  10 mg Oral Daily    epoetin derek-epbx  10,000 Units SubCUTAneous Once per day on Tue Thu Sat    escitalopram  10 mg Oral Daily    sevelamer  800 mg Oral TID WC    insulin glargine-yfgn  6 Units SubCUTAneous Nightly    metoprolol succinate  25 mg Oral Daily    midodrine  15 mg Oral Q MWF    senna  2 tablet Oral BID    traZODone  25 mg Oral Nightly    Vitamin D  1,000 Units Oral Daily    sodium chloride flush  5-40 mL IntraVENous 2 times per day    enoxaparin  30 mg SubCUTAneous Daily    insulin lispro  0-8 Units SubCUTAneous TID WC    insulin lispro  0-4 Units SubCUTAneous Nightly    vancomycin (VANCOCIN) intermittent dosing (placeholder)   Other RX Placeholder        sodium chloride      dextrose         Meds prn:     bisacodyl, sodium chloride flush, sodium chloride, acetaminophen **OR** acetaminophen, glucose, dextrose bolus **OR** dextrose bolus, glucagon (rDNA), dextrose, polyethylene glycol, sodium chloride flush    Meds prior to admission:     No current facility-administered medications on file prior to encounter.      Current Outpatient Medications on File Prior to Encounter   Medication Sig Dispense Refill    traMADol (ULTRAM) 50 MG tablet Take 50 mg by mouth every 8 hours as needed for Pain.      vitamin C (ASCORBIC ACID) 500 MG tablet Take 500 mg by mouth 2 times daily      glucagon, rDNA, 1 MG injection Inject 1 mg into the muscle as needed for Low blood sugar      ferric citrate (AURYXIA) 1  MG(Fe) TABS tablet Take 420 mg by mouth 3 times daily (with meals)      benzonatate (TESSALON) 100 MG capsule Take 100 mg by mouth 3 times daily as needed for Cough      bisacodyl (DULCOLAX) 10 MG suppository Place 10 mg rectally daily as needed for Constipation      glucose (GLUTOSE) 40 % GEL Take 15 g by mouth as needed (hypoglycemia)      guaiFENesin 200 MG/5ML LIQD Take 200 mg by mouth every 6 hours as needed (cough)      insulin lispro, 1 Unit Dial, (HUMALOG/ADMELOG) 100 UNIT/ML SOPN Inject 0-10 Units into the skin 3 times daily (before meals) *Per Sliding Scale*  160-200 = 2 units  201-250 = 4 units  251-300 = 6 units  301-350 = 8 units  351-400 = 10 units + notify MD      lactulose (CHRONULAC) 10 GM/15ML solution Take 10 g by mouth daily as needed (constipation)      busPIRone (BUSPAR) 10 MG tablet Take 10 mg by mouth daily      polyethylene glycol (GLYCOLAX) 17 g packet Take 17 g by mouth daily as needed for Constipation oxyCODONE-acetaminophen (PERCOCET) 5-325 MG per tablet Take 1 tablet by mouth every 6 hours as needed for Pain.      epoetin derek-epbx (RETACRIT) 41374 UNIT/ML SOLN injection Inject 10,000 Units into the skin See Admin Instructions Given Tuesday,Thursday,Saturday      senna (SENOKOT) 8.6 MG tablet Take 2 tablets by mouth 2 times daily      traZODone (DESYREL) 50 MG tablet Take 50 mg by mouth nightly      acetaminophen (TYLENOL) 325 MG tablet Take 650 mg by mouth every 4 hours as needed for Pain or Fever      vitamin D (CHOLECALCIFEROL) 25 MCG (1000 UT) TABS tablet Take 1,000 Units by mouth daily      ondansetron (ZOFRAN) 4 MG tablet Take 4 mg by mouth every 4 hours as needed for Nausea or Vomiting      insulin glargine (LANTUS) 100 UNIT/ML injection vial Inject 6 Units into the skin nightly 10 mL 3    midodrine (PROAMATINE) 5 MG tablet Take 15 mg by mouth See Admin Instructions Given Monday,Wednesday,Friday      metoprolol succinate (TOPROL XL) 25 MG extended release tablet Take 1 tablet by mouth daily 30 tablet 3    escitalopram (LEXAPRO) 10 MG tablet Take 10 mg by mouth daily      gabapentin (NEURONTIN) 300 MG capsule Take 300 mg by mouth daily. Allergies:    Patient has no known allergies. Social History:     reports that she quit smoking about 11 years ago. Her smoking use included cigarettes. She has a 30.00 pack-year smoking history. She has never used smokeless tobacco. She reports that she does not drink alcohol and does not use drugs.     Family History:         Problem Relation Age of Onset    Cancer Sister      Physical Exam:      Patient Vitals for the past 24 hrs:   BP Temp Temp src Pulse Resp SpO2 Weight   03/16/23 0901 -- 97.2 °F (36.2 °C) -- (!) 270 16 -- --   03/16/23 0536 -- -- -- -- -- -- 185 lb 9.6 oz (84.2 kg)   03/15/23 2315 (!) 112/54 97.5 °F (36.4 °C) Temporal 79 18 97 % --   03/15/23 2030 (!) 189/78 98.9 °F (37.2 °C) Temporal 74 20 98 % --   03/15/23 1750 (!) 153/63 97.2 °F (36.2 °C) Temporal 80 18 96 % --   03/15/23 1643 (!) 104/54 -- -- 77 18 95 % --   03/15/23 1148 (!) 141/41 96.9 °F (36.1 °C) -- 81 20 -- --   03/15/23 1100 (!) 149/95 -- -- 82 -- -- --   03/15/23 1030 (!) 149/49 -- -- 85 -- -- --   03/15/23 1000 110/63 -- -- 79 -- -- --   03/15/23 0930 (!) 141/69 -- -- 73 -- -- --           Intake/Output Summary (Last 24 hours) at 3/16/2023 0906  Last data filed at 3/16/2023 0900  Gross per 24 hour   Intake 600 ml   Output 2300 ml   Net -1700 ml       General: Awake, alert, no acute distress  Neck: No JVD noted  Lungs: Clear bilaterally upper, diminished to the bases bilaterally. Unlabored  CV: Regular rate and rhythm. No rub  Abd: Soft, nontender, nondistended. Active bowel sounds  Skin: Warm and dry. No rash on exposed extremities  Ext: Trace BLE edema   Neuro: Awake, answers questions appropriately    Data:    Recent Labs     03/14/23  1225 03/15/23  0748 03/16/23  0739   WBC 12.7* 7.1 7.6   HGB 10.1* 9.8* 9.9*   HCT 32.1* 31.5* 31.0*   .2* 110.1* 108.8*    163 192         Recent Labs     03/13/23  1100 03/14/23  1225 03/15/23  0748    134 135   K 5.1* 3.9 4.0    94* 97*   CO2 28 27 27   CREATININE 6.4* 3.4* 4.5*   BUN 42* 19 24*   LABGLOM 6 14 10   GLUCOSE 157* 120* 88   CALCIUM 9.7 9.7 9.1         Vit D, 25-Hydroxy   Date Value Ref Range Status   11/11/2022 32 30 - 100 ng/mL Final     Comment:     <20 ng/mL. ........... Author Maya Deficient  20-30 ng/mL. ......... Author Maya Insufficient   ng/mL. ........ Author [place] Sufficient  >100 ng/mL. .......... Author Maya Toxic         PTH   Date Value Ref Range Status   11/06/2022 466 (H) 15 - 65 pg/mL Final       Recent Labs     03/13/23  1100 03/14/23  1225 03/15/23  0748   ALT <5 8 6   AST 12 14 11   ALKPHOS 69 63 58   BILITOT 0.4 0.5 0.4         Recent Labs     03/13/23  1100 03/14/23  1225 03/15/23  0748   LABALBU 3.5 3.4* 3.1*         Ferritin   Date Value Ref Range Status   05/05/2021 767 ng/mL Final     Comment:     FERRITIN Reference Ranges:  Adult Males   20 - 60 years:    30 - 400 ng/mL  Adult females 16 - 60 years:    15 - 150 ng/mL  Adults greater than 60 years:   no established reference range  Pediatrics:                     no established reference range       Iron   Date Value Ref Range Status   05/05/2021 74 37 - 145 mcg/dL Final     TIBC   Date Value Ref Range Status   05/05/2021 116 (L) 250 - 450 mcg/dL Final       Vitamin B-12   Date Value Ref Range Status   05/05/2021 895 211 - 946 pg/mL Final       Folate   Date Value Ref Range Status   05/05/2021 >20.0 4.8 - 24.2 ng/mL Final       Lab Results   Component Value Date/Time    COLORU Yellow 03/14/2023 04:50 PM    NITRU Negative 03/14/2023 04:50 PM    GLUCOSEU 100 03/14/2023 04:50 PM    GLUCOSEU NEGATIVE 08/17/2011 10:30 PM    KETUA Negative 03/14/2023 04:50 PM    UROBILINOGEN 0.2 03/14/2023 04:50 PM    BILIRUBINUR Negative 03/14/2023 04:50 PM    BILIRUBINUR NEGATIVE 08/17/2011 10:30 PM       No results found for: MAGDA, CREURRAN, MACREATRATIO, OSMOU    No components found for: URIC    No results found for: LIPIDPAN    Assessment and Plans:    ESRD on HD  Outpatient at the dialysis den at [facility] HD while inpatient    2. Altered mental status  CT of the head no acute abnormality  Management per primary service    3. Wounds  CT abd pelvis 3/14 no significant change in sacral decubitus ulcer  XR right knee 3/14 soft tissue swelling  CT right knee 3/14 chronic fracture distal femoral and cutaneous scarring mild ulceration   Positive blood cultures-on antibiotics  Infectious disease and orthopedic surgery consulted    4. Anemia in CKD  Hemoglobin target 10-12  Hemoglobin 9.9 today-below target  ARUN  Transfuse hemoglobin<7  Monitor H&H    5. Secondary hyperparathyroidism of renal origin   and phosphorus 3.7 on 3/2 in the outpatient setting  Phos 3.9 today  On Renvela  Monitor labs    6.   Hypertension CKD 5/ESRD  BP goal<140/90  BP at goal  Monitor BPs    Michael Kovacs, APRN - CNP    Patient seen and examined all key components of the physical performed independently , case discussed with NP, all pertinent labs and radiologic tests personally reviewed agree with above.       Nancy Todd MD

## 2023-03-16 NOTE — PROGRESS NOTES
Dr. Anais Mendiola answering service contacted regarding patients blood pressure of 188/91. Awaiting return call at this time.

## 2023-03-16 NOTE — PROGRESS NOTES
Dr. Mary Jane Naqvi unable to be reached at this time. Nephrology answering service now called in regards to patients blood pressure of 188/91. Awaiting a return call at this time.

## 2023-03-16 NOTE — PROGRESS NOTES
Physical Therapy    Patient received and chart reviewed. Patient is currently declining PT services d/t R knee pain. Pt also stating \"I don't want all of these people coming in and out of the house\". Pt appeared confused and agitated. Pt educated on benefits of mobilization. Will follow up as appropriate.      Rodri Del Rosario, PT, DPT  [id number]

## 2023-03-16 NOTE — FLOWSHEET NOTE
Inpatient Wound Care(initial evaluation)  7423    Admit Date: 3/14/2023 12:04 PM    Reason for consult:  right knee, sacrum    Significant history:  per H & P  CHIEF COMPLAINT:  Altered mental status, probable sepsis. multiple admissions for confusion and mental status changes, was sent again in from the nursing home for the same  found to have a purulent wound in right leg just above the knee, chronic sacral decubitus which is possibly infected. I saw the patient this  morning. At that time, she was alert and oriented to time, person, and place. Wound history:  admitted with wounds from facility    Findings:     03/16/23 1507   Skin Integumentary    Skin Integrity Ecchymosis   Location BUE   Skin Integrity Site 2   Skin Integrity Location 2   (old healed area)   Location 2 right knee   Skin Integrity Site 3   Skin Integrity Location 3 Excoriation; Redness    Location 3 breast, abdominal fold   Wound 03/15/23 Knee Right;Lateral   Date First Assessed/Time First Assessed: 03/15/23 1821   Present on Hospital Admission: Yes  Primary Wound Type: (c)   Location: Knee  Wound Location Orientation: Right;Lateral   Wound Image    Dressing Status New dressing applied   Wound Cleansed Cleansed with saline   Dressing/Treatment Alginate  (stratasorb)   Wound Length (cm) 2 cm   Wound Width (cm) 1 cm   Wound Depth (cm) 0.7 cm   Wound Surface Area (cm^2) 2 cm^2   Change in Wound Size % (l*w) 52.38   Wound Volume (cm^3) 1.4 cm^3   Wound Healing % -233   Wound Assessment Pink/red;Purple/maroon   Drainage Amount None   Carmen-wound Assessment Dry/flaky  (old healed)   Wound 03/15/23 Sacrum   Date First Assessed/Time First Assessed: 03/15/23 1824   Present on Hospital Admission: Yes  Location: Sacrum   Wound Image    Wound Etiology Pressure Stage 4   Dressing Status New dressing applied   Wound Cleansed Cleansed with saline   Dressing/Treatment Alginate;Dry dressing;ABD   Wound Length (cm) 6.6 cm   Wound Width (cm) 4 cm   Wound Depth (cm) 1.6 cm   Wound Surface Area (cm^2) 26.4 cm^2   Change in Wound Size % (l*w) 8.01   Wound Volume (cm^3) 42.24 cm^3   Wound Healing % 58   Undermining Starts ___ O'Clock 3   Undermining Ends___ O'Clock 5   Undermining Maxium Distance (cm) 1.3   Wound Assessment Pink/red   Drainage Amount Scant   Drainage Description Serosanguinous   Odor None   Carmen-wound Assessment   (old healed, chronic discoloration)     **Informed Consent**    The patient has given verbal consent to have photos taken of wounds and inserted into their chart as part of their permanent medical record for purposes of documentation, treatment management and/or medical review. All Images taken on 3/16/23 of patient name: Jens León were transmitted and stored on secured [employer] located within [employer] by a registered [employer] Mobile Application Device.       Impression:  stage 4 sacrum    Plan:  Opticell to right knee and sacrum  Dietary consult  Low air loss module  Will need continued preventative care  Antifungal powder    Ara Husain RN 3/16/2023 3:35 PM

## 2023-03-16 NOTE — CARE COORDINATION
Pt admitted for wound infection from [facility], per [facility], pt is a medicaid bed hold. Envelope and ambulance form in soft chart. Will need a -COVID day of discharge. Terence Hutchinson, MSW, LSW

## 2023-03-16 NOTE — PROGRESS NOTES
DVT Prophylaxis Adjustment Policy (DVT Prophylaxis)     This patient is on DVT Prophylaxis medication that requires a dose adjustment      Date Body Weight IBW  Adjusted BW SCr  CrCl Dialysis status   3/16/2023 185 lb 9.6 oz (84.2 kg) Ideal body weight: 47.8 kg (105 lb 6.1 oz)  Adjusted ideal body weight: 62.4 kg (137 lb 7.5 oz) Serum creatinine: 3.4 mg/dL (H) 03/16/23 0739  Estimated creatinine clearance: 15 mL/min (A) HD       Pharmacy has dose-adjusted the DVT Prophylaxis regimen to match   the recommendations from the following table        Ordered Medication:Lovenox 30mg daily    Order Changed/converted to: Heparin 5000 Units TID      These changes were made per protocol according to the [facility] Pharmacist   Review for Appropriate Use and Automatic Dose Adjustments of   Subcutaneous Anticoagulants Policy     *Please note this dose may need readjusted if patient's condition changes. Please contact pharmacy with any questions regarding these changes.     Erickson Coronado [facility]  3/16/2023  2:05 PM

## 2023-03-16 NOTE — PLAN OF CARE
Problem: Safety - Adult  Goal: Free from fall injury  Outcome: Progressing  Flowsheets (Taken 3/16/2023 1226)  Free From Fall Injury: Instruct family/caregiver on patient safety     Problem: Discharge Planning  Goal: Discharge to home or other facility with appropriate resources  Outcome: Progressing  Flowsheets (Taken 3/16/2023 0915)  Discharge to home or other facility with appropriate resources: Identify barriers to discharge with patient and caregiver     Problem: Skin/Tissue Integrity  Goal: Absence of new skin breakdown  Description: 1. Monitor for areas of redness and/or skin breakdown  2. Assess vascular access sites hourly  3. Every 4-6 hours minimum:  Change oxygen saturation probe site  4. Every 4-6 hours:  If on nasal continuous positive airway pressure, respiratory therapy assess nares and determine need for appliance change or resting period.   Outcome: Progressing     Problem: ABCDS Injury Assessment  Goal: Absence of physical injury  Outcome: Progressing  Flowsheets (Taken 3/16/2023 1226)  Absence of Physical Injury: Implement safety measures based on patient assessment     Problem: Risk for Elopement  Goal: Patient will not exit the unit/facility without proper excort  Outcome: Progressing  Flowsheets (Taken 3/16/2023 0915)  Nursing Interventions for Elopement Risk:   Assist with personal care needs such as toileting, eating, dressing, as needed to reduce the risk of wandering   Collaborate with family members/caregivers to mitigate the elopement risk   Collaborate with treatment team for drug withdrawal symptoms treatment   Communicate/escalate to charge nurse the risk of elopement   Collaborate with treatment team for nicotine replacement Yes

## 2023-03-16 NOTE — PROGRESS NOTES
Subjective: The patient is awake and alert. Very confused and agitated this AM.Thinks she is at her aunt D;s house. Objective:    BP (!) 112/54   Pulse (!) 270   Temp 97.2 °F (36.2 °C)   Resp 16   Ht 5' 1\" (1.549 m)   Wt 185 lb 9.6 oz (84.2 kg)   SpO2 97%   BMI 35.07 kg/m²     Heart:  RR tachy no murmurs, gallops, or rubs.   Lungs:  CTA bilaterally, no wheeze, rales or rhonchi  Abd: bowel sounds present, nontender, nondistended, no masses  Extrem:  No clubbing, cyanosis, or edema  Neuro; confused but no motor deficits  CBC with Differential:    Lab Results   Component Value Date/Time    WBC 7.6 03/16/2023 07:39 AM    RBC 2.85 03/16/2023 07:39 AM    HGB 9.9 03/16/2023 07:39 AM    HCT 31.0 03/16/2023 07:39 AM     03/16/2023 07:39 AM    .8 03/16/2023 07:39 AM    MCH 34.7 03/16/2023 07:39 AM    MCHC 31.9 03/16/2023 07:39 AM    RDW 13.2 03/16/2023 07:39 AM    NRBC 0.0 11/26/2022 07:02 AM    SEGSPCT 71 08/16/2013 05:00 AM    BANDSPCT 1 03/29/2016 07:37 PM    METASPCT 0.9 03/15/2023 07:48 AM    LYMPHOPCT 10.4 03/15/2023 07:48 AM    PROMYELOPCT 0.9 11/07/2022 05:59 AM    MONOPCT 9.6 03/15/2023 07:48 AM    MYELOPCT 0.9 11/26/2022 07:02 AM    BASOPCT 0.9 03/15/2023 07:48 AM    MONOSABS 0.71 03/15/2023 07:48 AM    LYMPHSABS 0.71 03/15/2023 07:48 AM    EOSABS 0.31 03/15/2023 07:48 AM    BASOSABS 0.06 03/15/2023 07:48 AM     CMP:    Lab Results   Component Value Date/Time     03/16/2023 07:39 AM    K 4.3 03/16/2023 07:39 AM    K 4.0 03/15/2023 07:48 AM    CL 99 03/16/2023 07:39 AM    CO2 30 03/16/2023 07:39 AM    BUN 15 03/16/2023 07:39 AM    CREATININE 3.4 03/16/2023 07:39 AM    GFRAA 7 10/12/2022 03:49 AM    LABGLOM 14 03/16/2023 07:39 AM    GLUCOSE 89 03/16/2023 07:39 AM    GLUCOSE 149 10/12/2011 09:59 AM    PROT 6.3 03/15/2023 07:48 AM    LABALBU 3.1 03/15/2023 07:48 AM    LABALBU 4.1 10/12/2011 09:59 AM    CALCIUM 9.3 03/16/2023 07:39 AM    BILITOT 0.4 03/15/2023 07:48 AM    ALKPHOS 58 03/15/2023 07:48 AM    AST 11 03/15/2023 07:48 AM    ALT 6 03/15/2023 07:48 AM        Assessment:    Patient Active Problem List   Diagnosis    Neurologic gait dysfunction    Diabetes mellitus ([address]    Hypertension    Arthritis    Knee problem    Anemia due to stage 3 chronic kidney disease    Primary osteoarthritis of both knees    Moderate protein-calorie malnutrition (HCC)    Pressure injury of sacral region, stage 4 (HCC)    Pressure injury of right hip, stage 3 (HCC)    Left bundle branch block    Moderate obesity    Failure to thrive in adult    Pressure injury of sacral region, stage 3 (HCC)    Decreased dorsalis pedis pulse    ESRD on hemodialysis ([address]    Closed bicondylar fracture of distal femur, right, initial encounter ([address]    Other fracture of right femur, initial encounter for closed fracture ([address]    Closed fracture of distal ends of left radius and ulna    Closed bicondylar fracture of distal end of right femur ([address]    Pneumonia due to infectious organism, unspecified laterality, unspecified part of lung    Pneumonia due to organism    Wound infection    Sepsis ([address]       Plan:  Roselyn garcia  Hope confusion will clear with treatment of her infection  Psych consult        Ravi Tyson MD  9:16 AM  3/16/2023

## 2023-03-16 NOTE — PROGRESS NOTES
Pharmacy Consultation Note  (Antibiotic Dosing and Monitoring)    Initial consult date: 3/15  Consulting physician/provider: Dr. Charity Mccall  Drug: Vancomycin  Indication: Bloodstream infection    Age/  Gender Height Weight IBW  Allergy Information   72 y.o./female 5' 1\" (154.9 cm) 195 lb (88.5 kg)     Ideal body weight: 47.8 kg (105 lb 6.1 oz)  Adjusted ideal body weight: 62.4 kg (137 lb 7.5 oz)   Patient has no known allergies. Renal Function:  Recent Labs     03/14/23  1225 03/15/23  0748 03/16/23  0739   BUN 19 24* 15   CREATININE 3.4* 4.5* 3.4*         Intake/Output Summary (Last 24 hours) at 3/16/2023 0955  Last data filed at 3/16/2023 0900  Gross per 24 hour   Intake 600 ml   Output 2300 ml   Net -1700 ml         Vancomycin Monitoring:  Trough:  No results for input(s): VANCOTROUGH in the last 72 hours. Random:    Recent Labs     03/16/23  0739   VANCORANDOM 26.7       Recent Labs     03/14/23  1230   BLOODCULT2 24 Hours no growth          Historical Cultures:  Organism   Date Value Ref Range Status   03/14/2023 Gram negative brien (A)  Preliminary     Recent Labs     03/14/23  1225   BC Gram stain performed from blood culture bottle media  Gram positive coccobacilli  *       Vancomycin Administration Times:  Recent vancomycin administrations                     vancomycin (VANCOCIN) 750 mg in sodium chloride 0.9 % 250 mL IVPB (mg) 750 mg New Bag 03/15/23 1501    vancomycin (VANCOCIN) 1,750 mg in sodium chloride 0.9 % 500 mL IVPB (mg) 1,750 mg New Bag 03/14/23 1718                      Date  HD Drug/Dose Time   Given Level(s)   (Time)   3/14 --  Vancomycin 1750 mg IV x1 1718    3/15 HD x 3.5 hrs  Ended @1148 Vancomycin 750 mg IV x1 1501    3/16 -- -- --  26.7 @0739                Assessment:  Patient is a 67 y.o. female who has been initiated on vancomycin  Estimated Creatinine Clearance: 15 mL/min (A) (based on SCr of 3.4 mg/dL (H)).   To dose vancomycin, pharmacy will be utilizing dosing based off of levels due to patient requiring hemodialysis  3/16: Random vanco level = 26.7 mcg/ml. No HD today. Plan:  No vancomycin today. Further dosing based on HD schedule and vanco levels.    Will check vancomycin levels when appropriate  Will continue to monitor renal function   Pharmacy to follow    Ebony Ott PharmD, BCPS 3/16/2023 9:56 AM

## 2023-03-16 NOTE — PROGRESS NOTES
OT SESSION ATTEMPT     Date:3/16/2023  Patient Name: Andria Bland  MRN: [id number]  : 1950  Room: [address] [place] [address]     Occupational therapy orders received/chart review completed and OT session attempted this date:    [] unavailable due to other medical staff currently with pt   [] on hold, await MRI/ neurosurgical recommendations. [] on hold per nursing staff secondary to lab / radiology results    [x] declined Occupational Therapy  this date due to R knee pain. Pt also stating \"I don't want all of these people coming in and out of the house\". Pt appeared confused & easily agitated. Benefits of participation in therapy reviewed with pt.    [] off unit   [] Other:     Will reattempt OT eval at a later time/date.     Kevin Aviles OTR/L; [id number]

## 2023-03-16 NOTE — PROGRESS NOTES
Charge RN Judith Hampton ws able to reach  regarding blood pressure, states he will add prn hydralazine order, awaiting order at this time.

## 2023-03-16 NOTE — ACP (ADVANCE CARE PLANNING)
.Advance Care Planning   Healthcare Decision Maker:    Primary Decision Maker (Active): Johnson Mcguire - Child - [phone]    Primary Decision Maker: Inga Mcguire - Rey - [phone]    Click here to complete Healthcare Decision Makers including selection of the Healthcare Decision Maker Relationship (ie \"Primary\").

## 2023-03-16 NOTE — PROGRESS NOTES
[facility] PROGRESS NOTE      Chief complaint: Follow-up of bacteremia    The patient is a 67 y.o. female with history of hypertension, DM, ESRD on hemodialysis, sacral pressure ulcer,  right distal femur and left distal radius fractures following a fall on 11/4/2022 s/p ORIF of both the right distal femur and left wrist complicated by nonhealing wound associated with postop knee brace but not tracking to hardware, presented on 03/14 with confusion. On admission, she was afebrile and hemodynamically stable with leukocytosis of 13,000. CXR showed very patchy airspace disease within right upper and lower lobe, improved compared to that from 02/16. CT abdomen and pelvis showed no significant change in the sacral decubitus ulcer with stable appearance of the underlying bone and no evidence of abscess, distended rectum containing hardened stool and mild wall thickening suggestive of fecal impaction. CT right knee showed chronic fracture of distal femoral metadiaphysis with intact traversing intramedullary and lateral screw plate ORIF hardware with incomplete osseous bridging, cutaneous scarring or mild ulceration over the lateral supra geniculate knee soft tissues, likely the result of scarring from lateral screw plate fixation with no significant tracking subcutaneous emphysema. Urinalysis showed pyuria of 5-10 WBCs with urine culture showing no growth to date. Leg wound culture showed heavy growth of Proteus mirabilis and Staphylococcus aureus. Blood cultures showed Gram-positive coccobacilli (Enterococcus faecalis and methicillin-resistant Staphylococcus epidermidis by PCR). She received a dose of piperacillin-tazobactam and vancomycin on admission. Subjective: Patient was seen and examined. No chills, no abdominal pain, no diarrhea, no rash, no itching.       Objective:    Vitals:    03/16/23 0901   BP:    Pulse: (!) 270   Resp: 16   Temp: 97.2 °F (36.2 °C)   SpO2:      Constitutional: Alert, not in distress  Respiratory: Clear breath sounds, no crackles, no wheezes  Cardiovascular: Regular rate and rhythm, no murmurs  Gastrointestinal: Bowel sounds present, soft, nontender  Skin: Warm and dry, no active dermatoses. Sacral pressure ulcer stage 3 clean with no active purulence, surrounding necrosis and malodor  Musculoskeletal: Right leg wound with minimal yellow discharge    Labs, imaging, and medical records/notes were personally reviewed. Assessment:  Sepsis, secondary to Gram-positive coccobacilli (Enterococcus faecalis and methicillin-resistant Staphylococcus epidermidis by PCR) bacteremia, etiology unclear. Nonhealing wound, right leg, with no signs of gross infection  Sacral pressure ulcer    Recommendations:  Continue vancomycin dosed according to level per Pharmacy. Follow up blood and wound cultures. Continue local wound care. Continue supportive care. Thank you for involving me in the care of [employer]. I will continue to follow. Please do not hesitate to call for any questions or concerns.     Electronically signed by Wisam Tam MD on 3/16/2023 at 9:39 AM

## 2023-03-17 LAB
ANION GAP SERPL CALCULATED.3IONS-SCNC: 12 MMOL/L (ref 7–16)
BACTERIA WND AEROBE CULT: ABNORMAL
BUN SERPL-MCNC: 22 MG/DL (ref 6–23)
CALCIUM SERPL-MCNC: 9.5 MG/DL (ref 8.6–10.2)
CHLORIDE SERPL-SCNC: 101 MMOL/L (ref 98–107)
CO2 SERPL-SCNC: 28 MMOL/L (ref 22–29)
CREAT SERPL-MCNC: 4.9 MG/DL (ref 0.5–1)
ERYTHROCYTE [DISTWIDTH] IN BLOOD BY AUTOMATED COUNT: 13.3 FL (ref 11.5–15)
GLUCOSE SERPL-MCNC: 88 MG/DL (ref 74–99)
HCT VFR BLD AUTO: 33.2 % (ref 34–48)
HGB BLD-MCNC: 10.5 G/DL (ref 11.5–15.5)
MAGNESIUM SERPL-MCNC: 2.2 MG/DL (ref 1.6–2.6)
MCH RBC QN AUTO: 34.7 PG (ref 26–35)
MCHC RBC AUTO-ENTMCNC: 31.6 % (ref 32–34.5)
MCV RBC AUTO: 109.6 FL (ref 80–99.9)
METER GLUCOSE: 109 MG/DL (ref 74–99)
METER GLUCOSE: 143 MG/DL (ref 74–99)
METER GLUCOSE: 75 MG/DL (ref 74–99)
METER GLUCOSE: 88 MG/DL (ref 74–99)
ORGANISM: ABNORMAL
PHOSPHATE SERPL-MCNC: 4.3 MG/DL (ref 2.5–4.5)
PLATELET # BLD AUTO: 191 E9/L (ref 130–450)
PMV BLD AUTO: 10.2 FL (ref 7–12)
POTASSIUM SERPL-SCNC: 4.1 MMOL/L (ref 3.5–5)
RBC # BLD AUTO: 3.03 E12/L (ref 3.5–5.5)
SODIUM SERPL-SCNC: 141 MMOL/L (ref 132–146)
WBC # BLD: 8.2 E9/L (ref 4.5–11.5)

## 2023-03-17 PROCEDURE — 6370000000 HC RX 637 (ALT 250 FOR IP): Performed by: FAMILY MEDICINE

## 2023-03-17 PROCEDURE — 80048 BASIC METABOLIC PNL TOTAL CA: CPT

## 2023-03-17 PROCEDURE — 97535 SELF CARE MNGMENT TRAINING: CPT

## 2023-03-17 PROCEDURE — 2580000003 HC RX 258: Performed by: INTERNAL MEDICINE

## 2023-03-17 PROCEDURE — 36415 COLL VENOUS BLD VENIPUNCTURE: CPT

## 2023-03-17 PROCEDURE — 6360000002 HC RX W HCPCS: Performed by: INTERNAL MEDICINE

## 2023-03-17 PROCEDURE — 85027 COMPLETE CBC AUTOMATED: CPT

## 2023-03-17 PROCEDURE — 6370000000 HC RX 637 (ALT 250 FOR IP): Performed by: NURSE PRACTITIONER

## 2023-03-17 PROCEDURE — 90935 HEMODIALYSIS ONE EVALUATION: CPT

## 2023-03-17 PROCEDURE — 97166 OT EVAL MOD COMPLEX 45 MIN: CPT

## 2023-03-17 PROCEDURE — S5553 INSULIN LONG ACTING 5 U: HCPCS | Performed by: FAMILY MEDICINE

## 2023-03-17 PROCEDURE — 82962 GLUCOSE BLOOD TEST: CPT

## 2023-03-17 PROCEDURE — 2580000003 HC RX 258: Performed by: FAMILY MEDICINE

## 2023-03-17 PROCEDURE — 97530 THERAPEUTIC ACTIVITIES: CPT

## 2023-03-17 PROCEDURE — 97161 PT EVAL LOW COMPLEX 20 MIN: CPT

## 2023-03-17 PROCEDURE — 2060000000 HC ICU INTERMEDIATE R&B

## 2023-03-17 PROCEDURE — 2500000003 HC RX 250 WO HCPCS: Performed by: FAMILY MEDICINE

## 2023-03-17 PROCEDURE — 84100 ASSAY OF PHOSPHORUS: CPT

## 2023-03-17 PROCEDURE — 83735 ASSAY OF MAGNESIUM: CPT

## 2023-03-17 PROCEDURE — 6360000002 HC RX W HCPCS: Performed by: FAMILY MEDICINE

## 2023-03-17 RX ORDER — AMOXICILLIN AND CLAVULANATE POTASSIUM 500; 125 MG/1; MG/1
1 TABLET, FILM COATED ORAL EVERY 12 HOURS
Qty: 14 TABLET | Refills: 0 | DISCHARGE
Start: 2023-03-18 | End: 2023-03-25

## 2023-03-17 RX ORDER — PROCHLORPERAZINE MALEATE 10 MG
10 TABLET ORAL EVERY 6 HOURS PRN
Status: DISCONTINUED | OUTPATIENT
Start: 2023-03-17 | End: 2023-03-18 | Stop reason: HOSPADM

## 2023-03-17 RX ORDER — AMOXICILLIN AND CLAVULANATE POTASSIUM 500; 125 MG/1; MG/1
1 TABLET, FILM COATED ORAL EVERY 12 HOURS SCHEDULED
Status: DISCONTINUED | OUTPATIENT
Start: 2023-03-17 | End: 2023-03-18 | Stop reason: HOSPADM

## 2023-03-17 RX ADMIN — ANTI-FUNGAL POWDER MICONAZOLE NITRATE TALC FREE: 1.42 POWDER TOPICAL at 11:11

## 2023-03-17 RX ADMIN — Medication 1000 UNITS: at 11:09

## 2023-03-17 RX ADMIN — METOPROLOL SUCCINATE 25 MG: 25 TABLET, EXTENDED RELEASE ORAL at 11:10

## 2023-03-17 RX ADMIN — MIDODRINE HYDROCHLORIDE 15 MG: 5 TABLET ORAL at 11:10

## 2023-03-17 RX ADMIN — SEVELAMER CARBONATE 800 MG: 800 TABLET, FILM COATED ORAL at 17:23

## 2023-03-17 RX ADMIN — INSULIN GLARGINE-YFGN 6 UNITS: 100 INJECTION, SOLUTION SUBCUTANEOUS at 22:34

## 2023-03-17 RX ADMIN — Medication 10 ML: at 11:11

## 2023-03-17 RX ADMIN — Medication 10 ML: at 00:44

## 2023-03-17 RX ADMIN — PROCHLORPERAZINE MALEATE 10 MG: 10 TABLET ORAL at 23:44

## 2023-03-17 RX ADMIN — ESCITALOPRAM OXALATE 10 MG: 10 TABLET, FILM COATED ORAL at 11:09

## 2023-03-17 RX ADMIN — HEPARIN SODIUM 5000 UNITS: 10000 INJECTION INTRAVENOUS; SUBCUTANEOUS at 05:39

## 2023-03-17 RX ADMIN — HYDRALAZINE HYDROCHLORIDE 10 MG: 20 INJECTION INTRAMUSCULAR; INTRAVENOUS at 03:03

## 2023-03-17 RX ADMIN — ANTI-FUNGAL POWDER MICONAZOLE NITRATE TALC FREE: 1.42 POWDER TOPICAL at 00:44

## 2023-03-17 RX ADMIN — ANTI-FUNGAL POWDER MICONAZOLE NITRATE TALC FREE: 1.42 POWDER TOPICAL at 22:17

## 2023-03-17 RX ADMIN — HEPARIN SODIUM 5000 UNITS: 10000 INJECTION INTRAVENOUS; SUBCUTANEOUS at 14:46

## 2023-03-17 RX ADMIN — BUSPIRONE HYDROCHLORIDE 10 MG: 5 TABLET ORAL at 11:10

## 2023-03-17 RX ADMIN — VANCOMYCIN HYDROCHLORIDE 500 MG: 500 INJECTION, POWDER, LYOPHILIZED, FOR SOLUTION INTRAVENOUS at 14:48

## 2023-03-17 RX ADMIN — SENNOSIDES 17.2 MG: 8.6 TABLET, FILM COATED ORAL at 11:09

## 2023-03-17 RX ADMIN — HEPARIN SODIUM 5000 UNITS: 10000 INJECTION INTRAVENOUS; SUBCUTANEOUS at 22:34

## 2023-03-17 RX ADMIN — SEVELAMER CARBONATE 800 MG: 800 TABLET, FILM COATED ORAL at 11:09

## 2023-03-17 RX ADMIN — HYDRALAZINE HYDROCHLORIDE 10 MG: 20 INJECTION INTRAMUSCULAR; INTRAVENOUS at 21:13

## 2023-03-17 RX ADMIN — DIVALPROEX SODIUM 125 MG: 125 CAPSULE, COATED PELLETS ORAL at 14:46

## 2023-03-17 RX ADMIN — DIVALPROEX SODIUM 125 MG: 125 CAPSULE, COATED PELLETS ORAL at 05:39

## 2023-03-17 RX ADMIN — Medication 10 ML: at 22:18

## 2023-03-17 RX ADMIN — HEPARIN SODIUM 5000 UNITS: 10000 INJECTION INTRAVENOUS; SUBCUTANEOUS at 00:44

## 2023-03-17 RX ADMIN — DIVALPROEX SODIUM 125 MG: 125 CAPSULE, COATED PELLETS ORAL at 22:15

## 2023-03-17 NOTE — PROGRESS NOTES
Subjective:    The patient is awake and alert. Much better today. No problems overnight.  Denies chest pain, angina, and dyspnea.  Denies abdominal pain.  Tolerating diet.  No nausea or vomiting.    Objective:    BP (!) 182/54   Pulse 86   Temp 96.9 °F (36.1 °C)   Resp 16   Ht 5' 1\" (1.549 m)   Wt 186 lb 4.6 oz (84.5 kg)   SpO2 97%   BMI 35.20 kg/m²     Heart:  RRR, no murmurs, gallops, or rubs.  Lungs:  CTA bilaterally, no wheeze, rales or rhonchi  Abd: bowel sounds present, nontender, nondistended, no masses  Extrem:  No clubbing, cyanosis, or edema  Neuro intact  CBC with Differential:    Lab Results   Component Value Date/Time    WBC 8.2 03/17/2023 06:47 AM    RBC 3.03 03/17/2023 06:47 AM    HGB 10.5 03/17/2023 06:47 AM    HCT 33.2 03/17/2023 06:47 AM     03/17/2023 06:47 AM    .6 03/17/2023 06:47 AM    MCH 34.7 03/17/2023 06:47 AM    MCHC 31.6 03/17/2023 06:47 AM    RDW 13.3 03/17/2023 06:47 AM    NRBC 0.0 11/26/2022 07:02 AM    SEGSPCT 71 08/16/2013 05:00 AM    BANDSPCT 1 03/29/2016 07:37 PM    METASPCT 0.9 03/15/2023 07:48 AM    LYMPHOPCT 10.4 03/15/2023 07:48 AM    PROMYELOPCT 0.9 11/07/2022 05:59 AM    MONOPCT 9.6 03/15/2023 07:48 AM    MYELOPCT 0.9 11/26/2022 07:02 AM    BASOPCT 0.9 03/15/2023 07:48 AM    MONOSABS 0.71 03/15/2023 07:48 AM    LYMPHSABS 0.71 03/15/2023 07:48 AM    EOSABS 0.31 03/15/2023 07:48 AM    BASOSABS 0.06 03/15/2023 07:48 AM     CMP:    Lab Results   Component Value Date/Time     03/17/2023 06:47 AM    K 4.1 03/17/2023 06:47 AM    K 4.0 03/15/2023 07:48 AM     03/17/2023 06:47 AM    CO2 28 03/17/2023 06:47 AM    BUN 22 03/17/2023 06:47 AM    CREATININE 4.9 03/17/2023 06:47 AM    GFRAA 7 10/12/2022 03:49 AM    LABGLOM 9 03/17/2023 06:47 AM    GLUCOSE 88 03/17/2023 06:47 AM    GLUCOSE 149 10/12/2011 09:59 AM    PROT 6.3 03/15/2023 07:48 AM    LABALBU 3.1 03/15/2023 07:48 AM    LABALBU 4.1 10/12/2011 09:59 AM    CALCIUM 9.5 03/17/2023 06:47 AM    BILITOT  0.4 03/15/2023 07:48 AM    ALKPHOS 58 03/15/2023 07:48 AM    AST 11 03/15/2023 07:48 AM    ALT 6 03/15/2023 07:48 AM        Assessment:    Patient Active Problem List   Diagnosis    Neurologic gait dysfunction    Diabetes mellitus ([address]    Hypertension    Arthritis    Knee problem    Anemia due to stage 3 chronic kidney disease    Primary osteoarthritis of both knees    Moderate protein-calorie malnutrition (HCC)    Pressure injury of sacral region, stage 4 (HCC)    Pressure injury of right hip, stage 3 (HCC)    Left bundle branch block    Moderate obesity    Failure to thrive in adult    Pressure injury of sacral region, stage 3 (HCC)    Decreased dorsalis pedis pulse    ESRD on hemodialysis ([address]    Closed bicondylar fracture of distal femur, right, initial encounter ([address]    Other fracture of right femur, initial encounter for closed fracture ([address]    Closed fracture of distal ends of left radius and ulna    Closed bicondylar fracture of distal end of right femur ([address]    Pneumonia due to infectious organism, unspecified laterality, unspecified part of lung    Pneumonia due to organism    Wound infection    Sepsis ([address]    Hyperactive Delirium       Plan:  Continue current care  ATB per ALICE Hendricks MD  8:34 AM  3/17/2023

## 2023-03-17 NOTE — PROGRESS NOTES
Pharmacy Consultation Note  (Antibiotic Dosing and Monitoring)    Initial consult date: 3/15  Consulting physician/provider: Dr. Freddy Nicole  Drug: Vancomycin  Indication: Bloodstream infection    Age/  Gender Height Weight IBW  Allergy Information   72 y.o./female 5' 1\" (154.9 cm) 195 lb (88.5 kg)     Ideal body weight: 47.8 kg (105 lb 6.1 oz)  Adjusted ideal body weight: 62 kg (136 lb 12.4 oz)   Patient has no known allergies. Renal Function:  Recent Labs     03/15/23  0748 03/16/23  0739 03/17/23  0647   BUN 24* 15 22   CREATININE 4.5* 3.4* 4.9*         Intake/Output Summary (Last 24 hours) at 3/17/2023 1230  Last data filed at 3/17/2023 1049  Gross per 24 hour   Intake 300 ml   Output 1500 ml   Net -1200 ml         Vancomycin Monitoring:  Trough:  No results for input(s): VANCOTROUGH in the last 72 hours. Random:    Recent Labs     03/16/23  0739   VANCORANDOM 26.7         Recent Labs     03/15/23  1332   BLOODCULT2 24 Hours no growth          Historical Cultures:  Organism   Date Value Ref Range Status   03/14/2023 Staphylococcus coagulase-negative (A)  Preliminary   03/14/2023 Enterococcus faecalis (A)  Preliminary   03/14/2023 Proteus mirabilis (A)  Final   03/14/2023 Staphylococcus aureus (A)  Final   03/14/2023 Corynebacterium species (A)  Final     Recent Labs     03/15/23  1244   BC 24 Hours no growth       Vancomycin Administration Times:  Recent vancomycin administrations                     vancomycin (VANCOCIN) 750 mg in sodium chloride 0.9 % 250 mL IVPB (mg) 750 mg New Bag 03/15/23 1501    vancomycin (VANCOCIN) 1,750 mg in sodium chloride 0.9 % 500 mL IVPB (mg) 1,750 mg New Bag 03/14/23 1718                      Date  HD Drug/Dose Time   Given Level(s)   (Time)   3/14 --  Vancomycin 1750 mg IV x1 1718    3/15 HD x 3.5 hrs  Ended @1148 Vancomycin 750 mg IV x1 1501    3/16 -- -- --  26.7 @0739   3/17 HD x 3.5 hrs  Ended @1049 Vancomycin 500 mg IV x1 <1330>          Assessment:  Patient is a 67 y.o. female who has been initiated on vancomycin  Estimated Creatinine Clearance: 10 mL/min (A) (based on SCr of 4.9 mg/dL (H)). To dose vancomycin, pharmacy will be utilizing dosing based off of levels due to patient requiring hemodialysis  HD MWF    Plan:  Vancomycin 500 mg IV once today. Further dosing based on HD schedule and vanco levels.    Will check vancomycin levels when appropriate  Will continue to monitor renal function   Pharmacy to follow    Ebony Ott PharmD, BCPS 3/17/2023 12:30 PM

## 2023-03-17 NOTE — FLOWSHEET NOTE
Inpatient Wound Care(follow up) 7423    Admit Date: 3/14/2023 12:04 PM    Reason for consult:  left lateral breast  New area of alteration in skin     Findings:    03/17/23 1417   Wound 03/17/23 Lateral;Left breast   Date First Assessed/Time First Assessed: 03/17/23 1130   Present on Hospital Admission: No  Wound Location Orientation: Lateral;Left  Wound Description (Comments): breast   Wound Image    Dressing/Treatment Pharmaceutical agent (see MAR)   Wound Length (cm) 0.8 cm   Wound Width (cm) 0.6 cm   Wound Depth (cm) 0.1 cm   Wound Surface Area (cm^2) 0.48 cm^2   Change in Wound Size % (l*w) 45.45   Wound Volume (cm^3) 0.048 cm^3   Wound Healing % 45   Wound Assessment Pink/red   Drainage Amount None   Carmen-wound Assessment   (rash, red)     **Informed Consent**    The patient has given verbal consent to have photos taken of left lateral breast and inserted into their chart as part of their permanent medical record for purposes of documentation, treatment management and/or medical review. All Images taken on 3/17/23 of patient name: Jeremy Segundo were transmitted and stored on secured [employer] located within [facility] by a registered [employer] Mobile Application Device.       Impression:  partial thickness wound and rash    Plan:  Antifungal powder to area      Valente Alfaro RN 3/17/2023 2:18 PM

## 2023-03-17 NOTE — PLAN OF CARE
Problem: Safety - Adult  Goal: Free from fall injury  3/17/2023 1235 by John Paul Maharaj RN  Outcome: Progressing  Flowsheets (Taken 3/17/2023 1234)  Free From Fall Injury: Instruct family/caregiver on patient safety  3/17/2023 0625 by Jose Patel RN  Outcome: Progressing  Flowsheets (Taken 3/17/2023 0336)  Free From Fall Injury: Instruct family/caregiver on patient safety     Problem: Discharge Planning  Goal: Discharge to home or other facility with appropriate resources  3/17/2023 1235 by John Paul Maharaj RN  Outcome: Progressing  Flowsheets (Taken 3/17/2023 1130)  Discharge to home or other facility with appropriate resources: Identify barriers to discharge with patient and caregiver  3/17/2023 0625 by Jose Patel RN  Outcome: Progressing     Problem: Skin/Tissue Integrity  Goal: Absence of new skin breakdown  Description: 1. Monitor for areas of redness and/or skin breakdown  2. Assess vascular access sites hourly  3. Every 4-6 hours minimum:  Change oxygen saturation probe site  4. Every 4-6 hours:  If on nasal continuous positive airway pressure, respiratory therapy assess nares and determine need for appliance change or resting period.   3/17/2023 1235 by John Paul Maharaj RN  Outcome: Progressing  3/17/2023 0625 by Jose Patel RN  Outcome: Progressing     Problem: ABCDS Injury Assessment  Goal: Absence of physical injury  3/17/2023 1235 by John Paul Maharaj RN  Outcome: Progressing  Flowsheets (Taken 3/17/2023 1234)  Absence of Physical Injury: Implement safety measures based on patient assessment  3/17/2023 0625 by Jose Patel RN  Outcome: Progressing  Flowsheets (Taken 3/17/2023 0336)  Absence of Physical Injury: Implement safety measures based on patient assessment     Problem: Risk for Elopement  Goal: Patient will not exit the unit/facility without proper excort  3/17/2023 1235 by John Paul Maharaj RN  Outcome: Progressing  Flowsheets (Taken 3/17/2023 1130)  Nursing Interventions for Elopement Risk:   Assist with personal care needs such as toileting, eating, dressing, as needed to reduce the risk of wandering   Collaborate with family members/caregivers to mitigate the elopement risk   Collaborate with treatment team for drug withdrawal symptoms treatment   Communicate/escalate to charge nurse the risk of elopement   Collaborate with treatment team for nicotine replacement  3/17/2023 0625 by Adair Cook RN  Outcome: Progressing  Flowsheets (Taken 3/16/2023 2015)  Nursing Interventions for Elopement Risk:   Assist with personal care needs such as toileting, eating, dressing, as needed to reduce the risk of wandering   Collaborate with family members/caregivers to mitigate the elopement risk   Collaborate with treatment team for drug withdrawal symptoms treatment   Communicate/escalate to charge nurse the risk of elopement   Collaborate with treatment team for nicotine replacement     Problem: Nutrition Deficit:  Goal: Optimize nutritional status  3/17/2023 1235 by Hillary Hodge RN  Outcome: Progressing  3/17/2023 0625 by Adair Cook RN  Outcome: Progressing     Problem: Chronic Conditions and Co-morbidities  Goal: Patient's chronic conditions and co-morbidity symptoms are monitored and maintained or improved  3/17/2023 1235 by Hillary Hodge RN  Outcome: Progressing  Flowsheets (Taken 3/17/2023 1130)  Care Plan - Patient's Chronic Conditions and Co-Morbidity Symptoms are Monitored and Maintained or Improved: Monitor and assess patient's chronic conditions and comorbid symptoms for stability, deterioration, or improvement  3/17/2023 0625 by Adair Cook RN  Outcome: Progressing     Problem: Pain  Goal: Verbalizes/displays adequate comfort level or baseline comfort level  3/17/2023 1235 by Hillary Hodge RN  Outcome: Progressing  3/17/2023 0625 by Adair Cook RN  Outcome: Progressing

## 2023-03-17 NOTE — PROGRESS NOTES
OCCUPATIONAL THERAPY INITIAL EVALUATION     [address] [address] [place]      Date:3/17/2023                                                  Patient Name: Padmini Echevarria  MRN: [id number]  : 1950  Room: [facility]1399-N    Evaluating OT: GORDO Paula, OTR/L [id number]  Referring Day Peñaloza MD  Specific Provider Orders: OT eval and treat 3/15  Recommended Adaptive Equipment:  TBD     Diagnosis: wound infection   Surgery: none   Pertinent Medical History: DM, covid   Precautions:  Fall Risk, elopement, HD    Assessment of current deficits   [x] Functional mobility  [x]ADLs  [x] Strength               [x]Cognition   [x] Functional transfers   [x] IADLs         [x] Safety Awareness   [x]Endurance   [] Fine Coordination              [x] Balance      [] Vision/perception   [x]Sensation    []Gross Motor Coordination  [] ROM  [] Delirium                   [] Motor Control     OT PLAN OF CARE   OT POC based on physician orders, patient diagnosis and results of clinical assessment    Frequency/Duration: 2-4 days/wk for 2 weeks PRN   Specific OT Treatment to include:   * Instruction/training on adapted ADL techniques and AE recommendations to increase functional independence within precautions       * Training on energy conservation strategies, correct breathing pattern and techniques to improve independence/tolerance for self-care routine  * Functional transfer/mobility training/DME recommendations for increased independence, safety, and fall prevention  * Patient/Family education to increase follow through with safety techniques and functional independence  * Recommendation of environmental modifications for increased safety with functional transfers/mobility and ADLs  * Therapeutic exercise to improve motor endurance, ROM, and functional strength for ADLs/functional transfers  * Therapeutic activities to facilitate/challenge dynamic balance, stand tolerance for increased safety and independence with ADLs  * Neuro-muscular re-education: facilitation of righting/equilibrium reactions, midline orientation, scapular stability/mobility, normalization of muscle tone, and facilitation of volitional active controled movement    Home Living: Pt presents from [facility]. Pt required assist with ADLs and used a roger lift for transfers. Pain Level: 0/10  Cognition: A&O: 3/4; Follows 1 step directions, with repetition and increased time   Memory:  good    Sequencing:  good    Problem solving:  fair    Judgement/safety:  fair     Functional Assessment:  AM-PAC Daily Activity Raw Score: 12/24   Initial Eval Status  Date: 3/17/23 Treatment Status  Date: STGs=LTGs  Time Frame: 10-14 days   Feeding Min A   SBA   Grooming Min A (seated EOB for oral care)   SBA   UB Dressing Mod A   Min A   LB Dressing Dep (assist with socks)  Max A    Bathing Max A (simulated)  Mod A    Toileting Dep (bed level)  Max A   Bed Mobility  Log roll: Mod A  Supine to sit: Mod A   Sit to supine: Mod A   Log roll CGA  Supine to sit: CGA   Sit to supine: CGA   Functional Transfers Sit to stand:mod x 2   Stand to sit:mod x 2  Commode: nt  Mod A   Functional Mobility nt  Mod x 2   Balance Sitting: Min A (progressed to SBA)  Standing: Mod x 2     Activity Tolerance fair     Visual/  Perceptual Glasses: none              UE ROM: BUE: elbow flex WFL, shoulder flex grossly 90'  Strength: RUE: grossly 3/5 LUE: grossly 3/5   Strength: B WFL  Fine Motor Coordination:  fair    Hearing: WFL  Sensation:  No c/o numbness/tingling   Tone:  WFL  Edema: BLEs                            Comments:Cleared by RN to see pt. Upon arrival, patient supine in bed and agreeable to OT session. At end of session, patient supine in bed with call light and phone within reach, all lines and tubes intact. Pt would benefit from continued OT to increase functional independence and quality of life.     Treatment: Pt required vc's and physical assist for proper technique/safety with hand placement/body mechanics/posture for bed mobility/ADLs/functional tranfers. Pt required vc's for sequencing/initiation of ADLs/functional transfers. Pt able to  sit EOB ~10 mins to increase core strength/balance/activity tolerance for ease with ADLs. Pt required increased time to complete ADLs/functional transfers due to management of multiple lines, rest breaks anxiety, and physical assist.. Pt appeared to have tolerated session well and appears motivated/cooperative/pleasant . Pt requires encouragement to participate in tasks. Pt instructed on use of call light for assistance and fall prevention. Pt demo'ing fair understanding of education provided. Continue to educate. Eval Complexity: moderate  History: Expanded chart review of medical records and additional review of physical, cognitive, or psychosocial history related to current functional performance  Exam: 3+ performance deficits  Assistance/Modification: mod/max assistance or modifications required to perform tasks. May have comorbidities that affect occupational performance. Rehab Potential: Good for established goals, pt. assisted in establishment of goals. LTG: maximize independence with ADLs to return to PLOF    Patient  instructed on diagnosis, prognosis/goals and plan of care. Demonstrated fair understanding. [] Malnutrition indicators have been identified and nursing has been notified to ensure a dietitian consult is ordered. Evaluation time includes thorough review of current medical information, gathering information on past medical & social history & PLOF, completion of standardized testing, informal observation of tasks, consultation with other medical professions/disciplines, assessment of data & development of POC/goals.      Time In: 2:45       Time Out: 3:10     Total treatment time: 25       Treatment Charges: Mins Units   OT Eval Low 67668 X    OT Eval Medium 34645     OT Eval High 46444 OT Re-Eval [id number]     Ther Ex  [id number]       Manual Therapy 08795       Thera Activities 11179       ADL/Home Mgt 22997 15 1   Neuro Re-ed 07997       Group Therapy        Orthotic manage/training  36497       Non-Billable Time           Casi Andrea OTR/L [id number]

## 2023-03-17 NOTE — FLOWSHEET NOTE
03/17/23 1049   Vital Signs   BP (!) 173/53   Temp 96.9 °F (36.1 °C)   Heart Rate 86   Resp 18   Weight 183 lb 13.8 oz (83.4 kg)   Percent Weight Change -1.3   Post-Hemodialysis Assessment   Machine Disinfection Process Acid/Vinegar Clean;Heat Disinfect   Rinseback Volume (ml) 300 ml   Blood Volume Processed (Liters) 58.6 l/min   Dialyzer Clearance Lightly streaked   Duration of Treatment (minutes) 210 minutes   Heparin Amount Administered During Treatment (mL) 0 mL   Hemodialysis Intake (ml) 300 ml   Hemodialysis Output (ml) 1500 ml   NET Removed (ml) 1200   Tolerated Treatment Good   Patient Response to Treatment tolerated well   Bilateral Breath Sounds Diminished   Edema Generalized   Edema Generalized +1   Time Off 1018   Patient Disposition Return to room

## 2023-03-17 NOTE — PROGRESS NOTES
[facility] PROGRESS NOTE      Chief complaint: Follow-up of bacteremia    The patient is a 67 y.o. female with history of hypertension, DM, ESRD on hemodialysis, sacral pressure ulcer,  right distal femur and left distal radius fractures following a fall on 11/4/2022 s/p ORIF of both the right distal femur and left wrist complicated by nonhealing wound associated with postop knee brace but not tracking to hardware, presented on 03/14 with confusion. On admission, she was afebrile and hemodynamically stable with leukocytosis of 13,000. CXR showed very patchy airspace disease within right upper and lower lobe, improved compared to that from 02/16. CT abdomen and pelvis showed no significant change in the sacral decubitus ulcer with stable appearance of the underlying bone and no evidence of abscess, distended rectum containing hardened stool and mild wall thickening suggestive of fecal impaction. CT right knee showed chronic fracture of distal femoral metadiaphysis with intact traversing intramedullary and lateral screw plate ORIF hardware with incomplete osseous bridging, cutaneous scarring or mild ulceration over the lateral supra geniculate knee soft tissues, likely the result of scarring from lateral screw plate fixation with no significant tracking subcutaneous emphysema. Urinalysis showed pyuria of 5-10 WBCs with urine culture showing no growth to date. Leg wound culture showed heavy growth of Proteus mirabilis (non-ESBL) and Staphylococcus aureus (MSSA) and Corynebacterium. Blood cultures showed Gram-positive coccobacilli (Enterococcus faecalis and methicillin-resistant Staphylococcus epidermidis by PCR) in 1 out of 2 sets with repeat blood cultures the following day on 03/15 showed no growth to date. She received a dose of piperacillin-tazobactam and vancomycin on admission. Subjective: Patient was seen and examined. No chills, no abdominal pain, no diarrhea, no rash, no itching.       Objective:  BP (!) 148/65 Pulse 86   Temp (!) 96.2 °F (35.7 °C) (Oral)   Resp 18   Ht 5' 1\" (1.549 m)   Wt 183 lb 13.8 oz (83.4 kg)   SpO2 96%   BMI 34.74 kg/m²   Constitutional: Alert, not in distress  Respiratory: Clear breath sounds, no crackles, no wheezes  Cardiovascular: Regular rate and rhythm, no murmurs  Gastrointestinal: Bowel sounds present, soft, nontender  Skin: Warm and dry, no active dermatoses. Sacral pressure ulcer stage 3 clean with no active purulence, surrounding necrosis and malodor  Musculoskeletal: Right leg wound with minimal yellow discharge    Labs, imaging, and medical records/notes were personally reviewed. Assessment:  Sepsis, resolved, secondary to Enterococcus faecalis transient bacteremia, etiology unclear  Methicillin-resistant Staphylococcus epidermidis on blood culture, deemed contaminant. Nonhealing wound, right leg, with no signs of gross infection  Sacral pressure ulcer    Recommendations:  Change vancomycin to amoxicillin-clavulanate 500-125 mg BID for 7 days. Continue local wound care. Continue supportive care. Thank you for involving me in the care of [employer]. I will sign off for now. Please do not hesitate to call for any questions, concerns, or new findings.       Electronically signed by Tara Heard MD on 3/17/2023 at 11:21 AM

## 2023-03-17 NOTE — PLAN OF CARE
Problem: Safety - Adult  Goal: Free from fall injury  Outcome: Progressing  Flowsheets (Taken 3/17/2023 0336)  Free From Fall Injury: Instruct family/caregiver on patient safety     Problem: Discharge Planning  Goal: Discharge to home or other facility with appropriate resources  Outcome: Progressing     Problem: Skin/Tissue Integrity  Goal: Absence of new skin breakdown  Description: 1. Monitor for areas of redness and/or skin breakdown  2. Assess vascular access sites hourly  3. Every 4-6 hours minimum:  Change oxygen saturation probe site  4. Every 4-6 hours:  If on nasal continuous positive airway pressure, respiratory therapy assess nares and determine need for appliance change or resting period.   Outcome: Progressing     Problem: ABCDS Injury Assessment  Goal: Absence of physical injury  Outcome: Progressing  Flowsheets (Taken 3/17/2023 0336)  Absence of Physical Injury: Implement safety measures based on patient assessment     Problem: Risk for Elopement  Goal: Patient will not exit the unit/facility without proper excort  Outcome: Progressing  Flowsheets (Taken 3/16/2023 2015)  Nursing Interventions for Elopement Risk:   Assist with personal care needs such as toileting, eating, dressing, as needed to reduce the risk of wandering   Collaborate with family members/caregivers to mitigate the elopement risk   Collaborate with treatment team for drug withdrawal symptoms treatment   Communicate/escalate to charge nurse the risk of elopement   Collaborate with treatment team for nicotine replacement     Problem: Nutrition Deficit:  Goal: Optimize nutritional status  Outcome: Progressing     Problem: Chronic Conditions and Co-morbidities  Goal: Patient's chronic conditions and co-morbidity symptoms are monitored and maintained or improved  Outcome: Progressing     Problem: Pain  Goal: Verbalizes/displays adequate comfort level or baseline comfort level  Outcome: Progressing

## 2023-03-17 NOTE — PROGRESS NOTES
Orthopedic surgery update:    Images and labs were reviewed. It does not appear that her lateral wound communicates with the brien. For this reason we are getting continue to recommend conservative treatment. Recommend local wound care for her lateral wound. Recommend antibiotic suppression by ID. We will try to retain the hardware even though the fracture has shifted slightly. Any attempt to get this to heal nonoperatively would be best for the patient. If this fails to heal goes on to nonunion then we can talk about exchange nailing or other surgeries. At this point I do not think will be prudent proceed with operative intervention. Orthopedic surgery will be signing off this patient. She can follow-up in my office after discharge.       Kori Farrell DO   Orthopaedic Surgery   3/17/2023  12:53 PM

## 2023-03-17 NOTE — PROGRESS NOTES
Patient managed by Alvin Jackson is calling to say she is high risk and was given antibiotic Cipro  She has surgery coming up on 4/09/20    Would like a call back [phone] [facility]  Nephrology Progress Note    Patient's Name: Lynn Mckeon    History of Present Illness from 3/15 Consult Note:  \"Marisela Nash is a 67 y.o. female with a past medical history of ESRD on hemodialysis, COVID, hypertension, and sacral pressure ulcer. She presented to the ED on 3/14 with altered mental status. Vital signs on 3/14 include temperature 98.2, respirations 20, pulse 98, /74, and she was 98% SPO2. Lab data on 3/14 includes creatinine 3.4, lactic acid 2.7, WBCs 12.7 K, and hemoglobin 10.1. She had a chest x-ray on 3/14 which showed vague patchy airspace disease seen within the right upper lobe and right lower lobe. X-ray right knee 3/14 no acute bony abnormalities or hardware complications, soft tissue swelling and questionable soft tissue gas along the lateral aspect. CT of the head no acute abnormality. We were consulted to see the patient for dialysis. Patient is known to our service and dialyzes at the dialysis den at [facility]. At present, patient was seen and examined. She reports that she feels hungry this morning. She denies any chest pain or shortness of breath. She denies any abdominal pain, nausea, vomiting, or diarrhea. She denies any headaches or dizziness. She denies any fevers or chills. \"    Subjective:    3/17: Patient was seen and examined. She reports that she feels okay. She denies any chest pain or shortness of breath. She denies any abdominal pain, nausea, or vomiting.     PMH:    Past Medical History:   Diagnosis Date    Anemia in chronic kidney disease     Anxiety and depression     Arthritis     Chronic pain syndrome     COVID-19 05/2020    COVID-19     Decreased dorsalis pedis pulse 10/25/2022    Diabetes mellitus ([address]     Dysphagia, oral phase     ESRD on hemodialysis ([address] 10/25/2022    GERD (gastroesophageal reflux disease)     Hemodialysis patient ([address]     M-W-F    Hyperkalemia     Hypertension     Hypertensive kidney disease with stage 4 chronic kidney disease (HCC)     Insomnia     Kidney stones     MDD (major depressive disorder)     Moderate protein-calorie malnutrition (HCC)     Morbid obesity (HCC)     Muscle weakness (generalized)     Obesity     Pressure injury of sacral region, stage 3 ([address] 10/25/2022    Pressure ulcer of sacral region     Sacral pressure ulcer 08/03/2021    stage 4    Unspecified osteoarthritis, unspecified site     Weakness generalized        Patient Active Problem List   Diagnosis    Neurologic gait dysfunction    Diabetes mellitus ([address]    Hypertension    Arthritis    Knee problem    Anemia due to stage 3 chronic kidney disease    Primary osteoarthritis of both knees    Moderate protein-calorie malnutrition (HCC)    Pressure injury of sacral region, stage 4 (HCC)    Pressure injury of right hip, stage 3 (HCC)    Left bundle branch block    Moderate obesity    Failure to thrive in adult    Pressure injury of sacral region, stage 3 (HCC)    Decreased dorsalis pedis pulse    ESRD on hemodialysis ([address]    Closed bicondylar fracture of distal femur, right, initial encounter ([address]    Other fracture of right femur, initial encounter for closed fracture ([address]    Closed fracture of distal ends of left radius and ulna    Closed bicondylar fracture of distal end of right femur ([address]    Pneumonia due to infectious organism, unspecified laterality, unspecified part of lung    Pneumonia due to organism    Wound infection    Sepsis ([address]    Hyperactive Delirium       Diet:    Diet NPO    Meds:     divalproex  125 mg Oral 3 times per day    melatonin  5 mg Oral Daily    heparin (porcine)  5,000 Units SubCUTAneous Q8H    miconazole   Topical BID    busPIRone  10 mg Oral Daily    [Held by provider] epoetin derek-epbx  10,000 Units SubCUTAneous Once per day on Tue Thu Sat    escitalopram  10 mg Oral Daily    sevelamer  800 mg Oral TID WC    insulin glargine-yfgn  6 Units SubCUTAneous Nightly    metoprolol succinate  25 mg Oral Daily    midodrine  15 mg Oral Q MWF    senna  2 tablet Oral BID    traZODone  25 mg Oral Nightly    Vitamin D  1,000 Units Oral Daily    sodium chloride flush  5-40 mL IntraVENous 2 times per day    insulin lispro  0-8 Units SubCUTAneous TID WC    insulin lispro  0-4 Units SubCUTAneous Nightly    vancomycin (VANCOCIN) intermittent dosing (placeholder)   Other RX Placeholder        sodium chloride      dextrose         Meds prn:     ziprasidone (GEODON) in sterile water injection, hydrALAZINE, bisacodyl, sodium chloride flush, sodium chloride, acetaminophen **OR** acetaminophen, glucose, dextrose bolus **OR** dextrose bolus, glucagon (rDNA), dextrose, polyethylene glycol, sodium chloride flush    Meds prior to admission:     No current facility-administered medications on file prior to encounter.      Current Outpatient Medications on File Prior to Encounter   Medication Sig Dispense Refill    traMADol (ULTRAM) 50 MG tablet Take 50 mg by mouth every 8 hours as needed for Pain.      vitamin C (ASCORBIC ACID) 500 MG tablet Take 500 mg by mouth 2 times daily      glucagon, rDNA, 1 MG injection Inject 1 mg into the muscle as needed for Low blood sugar      ferric citrate (AURYXIA) 1  MG(Fe) TABS tablet Take 420 mg by mouth 3 times daily (with meals)      benzonatate (TESSALON) 100 MG capsule Take 100 mg by mouth 3 times daily as needed for Cough      bisacodyl (DULCOLAX) 10 MG suppository Place 10 mg rectally daily as needed for Constipation      glucose (GLUTOSE) 40 % GEL Take 15 g by mouth as needed (hypoglycemia)      guaiFENesin 200 MG/5ML LIQD Take 200 mg by mouth every 6 hours as needed (cough)      insulin lispro, 1 Unit Dial, (HUMALOG/ADMELOG) 100 UNIT/ML SOPN Inject 0-10 Units into the skin 3 times daily (before meals) *Per Sliding Scale*  160-200 = 2 units  201-250 = 4 units  251-300 = 6 units  301-350 = 8 units  351-400 = 10 units + notify MD      lactulose (CHRONULAC) 10 GM/15ML solution Take 10 g by mouth daily as needed (constipation)      busPIRone (BUSPAR) 10 MG tablet Take 10 mg by mouth daily      polyethylene glycol (GLYCOLAX) 17 g packet Take 17 g by mouth daily as needed for Constipation      oxyCODONE-acetaminophen (PERCOCET) 5-325 MG per tablet Take 1 tablet by mouth every 6 hours as needed for Pain.      epoetin derek-epbx (RETACRIT) 67801 UNIT/ML SOLN injection Inject 10,000 Units into the skin See Admin Instructions Given Tuesday,Thursday,Saturday      senna (SENOKOT) 8.6 MG tablet Take 2 tablets by mouth 2 times daily      traZODone (DESYREL) 50 MG tablet Take 50 mg by mouth nightly      acetaminophen (TYLENOL) 325 MG tablet Take 650 mg by mouth every 4 hours as needed for Pain or Fever      vitamin D (CHOLECALCIFEROL) 25 MCG (1000 UT) TABS tablet Take 1,000 Units by mouth daily      ondansetron (ZOFRAN) 4 MG tablet Take 4 mg by mouth every 4 hours as needed for Nausea or Vomiting      insulin glargine (LANTUS) 100 UNIT/ML injection vial Inject 6 Units into the skin nightly 10 mL 3    midodrine (PROAMATINE) 5 MG tablet Take 15 mg by mouth See Admin Instructions Given Monday,Wednesday,Friday      metoprolol succinate (TOPROL XL) 25 MG extended release tablet Take 1 tablet by mouth daily 30 tablet 3    escitalopram (LEXAPRO) 10 MG tablet Take 10 mg by mouth daily      gabapentin (NEURONTIN) 300 MG capsule Take 300 mg by mouth daily. Allergies:    Patient has no known allergies. Social History:     reports that she quit smoking about 11 years ago. Her smoking use included cigarettes. She has a 30.00 pack-year smoking history. She has never used smokeless tobacco. She reports that she does not drink alcohol and does not use drugs.     Family History:         Problem Relation Age of Onset    Cancer Sister      Physical Exam:      Patient Vitals for the past 24 hrs:   BP Temp Temp src Pulse Resp SpO2 Height Weight   03/17/23 0900 (!) 149/48 -- -- 82 -- -- -- --   03/17/23 0830 (!) 182/54 -- -- 86 -- -- -- --   03/17/23 0800 (!) 160/71 -- -- 77 -- -- -- --   03/17/23 0730 (!) 170/78 -- -- 79 -- -- -- --   03/17/23 0648 (!) 200/84 -- -- 86 -- -- -- --   03/17/23 0643 (!) 184/67 96.9 °F (36.1 °C) -- 87 -- -- -- 186 lb 4.6 oz (84.5 kg)   03/17/23 0600 -- -- -- -- -- -- -- 187 lb 11.2 oz (85.1 kg)   03/17/23 0303 (!) 176/88 98.3 °F (36.8 °C) Oral 86 16 -- -- --   03/16/23 2015 (!) 162/88 -- -- -- -- -- -- --   03/16/23 1930 (!) 163/102 97.7 °F (36.5 °C) Oral 84 18 97 % -- --   03/16/23 1430 (!) 188/91 96.9 °F (36.1 °C) Temporal 88 18 96 % -- --   03/16/23 1309 -- -- -- -- -- -- 5' 1\" (1.549 m) --   03/16/23 1200 -- -- -- 80 -- -- -- --         No intake or output data in the 24 hours ending 03/17/23 0904    General: Awake, alert, no acute distress  Neck: No JVD noted  Lungs: Clear bilaterally upper, diminished to the bases bilaterally. Unlabored  CV: Regular rate and rhythm. No rub  Abd: Soft, nontender, nondistended. Active bowel sounds  Skin: Warm and dry. No rash on exposed extremities  Ext: Trace BLE edema   Neuro: Awake, answers questions appropriately    Data:    Recent Labs     03/15/23  0748 03/16/23  0739 03/17/23  0647   WBC 7.1 7.6 8.2   HGB 9.8* 9.9* 10.5*   HCT 31.5* 31.0* 33.2*   .1* 108.8* 109.6*    192 191         Recent Labs     03/15/23  0748 03/16/23  0739 03/17/23  0647    139 141   K 4.0 4.3 4.1   CL 97* 99 101   CO2 27 30* 28   CREATININE 4.5* 3.4* 4.9*   BUN 24* 15 22   LABGLOM 10 14 9   GLUCOSE 88 89 88   CALCIUM 9.1 9.3 9.5   PHOS  --  3.9 4.3   MG  --  2.1 2.2         Vit D, 25-Hydroxy   Date Value Ref Range Status   11/11/2022 32 30 - 100 ng/mL Final     Comment:     <20 ng/mL. ........... Deepika Leaf Deficient  20-30 ng/mL. ......... Deepika Cantwell Insufficient   ng/mL. ........ Deepika Leaf Sufficient  >100 ng/mL. .......... Deepika Cantwell Toxic         PTH   Date Value Ref Range Status   11/06/2022 466 (H) 15 - 65 pg/mL Final       Recent Labs     03/14/23  1225 03/15/23  0748   ALT 8 6   AST 14 11   ALKPHOS 63 58   BILITOT 0.5 0.4         Recent Labs     03/14/23  1225 03/15/23  0748   LABALBU 3.4* 3.1*         Ferritin   Date Value Ref Range Status   05/05/2021 767 ng/mL Final     Comment:     FERRITIN Reference Ranges:  Adult Males   20 - 60 years:    30 - 400 ng/mL  Adult females 16 - 61 years:    15 - 150 ng/mL  Adults greater than 60 years:   no established reference range  Pediatrics:                     no established reference range       Iron   Date Value Ref Range Status   05/05/2021 74 37 - 145 mcg/dL Final     TIBC   Date Value Ref Range Status   05/05/2021 116 (L) 250 - 450 mcg/dL Final       Vitamin B-12   Date Value Ref Range Status   05/05/2021 895 211 - 946 pg/mL Final       Folate   Date Value Ref Range Status   05/05/2021 >20.0 4.8 - 24.2 ng/mL Final       Lab Results   Component Value Date/Time    COLORU Yellow 03/14/2023 04:50 PM    NITRU Negative 03/14/2023 04:50 PM    GLUCOSEU 100 03/14/2023 04:50 PM    GLUCOSEU NEGATIVE 08/17/2011 10:30 PM    KETUA Negative 03/14/2023 04:50 PM    UROBILINOGEN 0.2 03/14/2023 04:50 PM    BILIRUBINUR Negative 03/14/2023 04:50 PM    BILIRUBINUR NEGATIVE 08/17/2011 10:30 PM       No results found for: MAGDA, CREURRAN, MACREATRATIO, OSMOU    No components found for: URIC    No results found for: LIPIDPAN    Assessment and Plans:    ESRD on HD  Outpatient at the dialysis den at [facility] while inpatient-HD today    2. Altered mental status  CT of the head no acute abnormality  Management per primary service    3. Wounds  CT abd pelvis 3/14 no significant change in sacral decubitus ulcer  XR right knee 3/14 soft tissue swelling  CT right knee 3/14 chronic fracture distal femoral and cutaneous scarring mild ulceration   Positive blood cultures and wound culture-on antibiotics  Infectious disease and orthopedic surgery consulted    4.   Anemia in CKD  Hemoglobin target 10-12  Hemoglobin 10.5 today - at target  No ARUN as hemoglobin>10  Transfuse hemoglobin<7  Monitor H&H    5. Secondary hyperparathyroidism of renal origin   and phosphorus 3.7 on 3/2 in the outpatient setting  Phos 4.3 today  On Renvela  Monitor labs    6. Hypertension CKD 5/ESRD  BP goal<140/90  BP trending high  Adjust meds  Monitor BPs    PABLO Haskins - CNP    Patient seen and examined all key components of the physical performed independently , case discussed with NP, all pertinent labs and radiologic tests personally reviewed agree with above.       Nolberto Reza MD

## 2023-03-17 NOTE — PROGRESS NOTES
Physical Therapy  Physical Therapy Initial Assessment       Name: Jimmy Hathaway  : 1950  MRN: [id number]      Date of Service: 3/17/2023    Evaluating PT:  Claude Martins PT, DPT [id number]    Room #:  3511/8027-V  Diagnosis:  Wound infection [T14. 8XXA, L08.9]  Sepsis ([address] [A41.9]  Fecal impaction ([address] [K56.41]  Open wound of right knee, leg, and ankle, initial encounter [S81.001A, S81.801A, S91.001A]  PMHx/PSHx:   has a past medical history of Anemia in chronic kidney disease, Anxiety and depression, Arthritis, Chronic pain syndrome, COVID-19, COVID-19, Decreased dorsalis pedis pulse, Diabetes mellitus ([address] Dysphagia, oral phase, ESRD on hemodialysis ([address] GERD (gastroesophageal reflux disease), Hemodialysis patient ([address] Hyperkalemia, Hypertension, Hypertensive kidney disease with stage 4 chronic kidney disease ([address] Insomnia, Kidney stones, MDD (major depressive disorder), Moderate protein-calorie malnutrition ([address] Morbid obesity ([address] Muscle weakness (generalized), Obesity, Pressure injury of sacral region, stage 3 ([address] Pressure ulcer of sacral region, Sacral pressure ulcer, Unspecified osteoarthritis, unspecified site, and Weakness generalized. Procedure/Surgery:  none this admission. Precautions: Falls,  sacral wound, HD, bed alarm     SUBJECTIVE:    Pt is from [facility] for rehab. Pt reports she used a roger from bed <> WC, and was standing with therapy in the parallel bars. Equipment Owned: WC, [employer]  Equipment Needs:  TBD    OBJECTIVE:   Initial Evaluation  Date: 3/17/23 Treatment Short Term/ Long Term   Goals   AM-PAC 6 Clicks      Was pt agreeable to Eval/treatment? Yes      Does pt have pain?  Reports buttock pain where wound is      Bed Mobility  Rolling: ModA   Supine to sit: ModA  Sit to supine: ModA  Scooting: ModA  Rolling: SBA  Supine to sit: SBA   Sit to supine: SBA  Scooting: SBA    Transfers Sit to stand: ModA x2  Stand to sit: ModA x2  Stand pivot: NT  Sit to stand: Shannon  Stand to sit: Shannon  Stand pivot: ModA with [employer]   Ambulation    NT  10 feet with [employer] ModA   Stair negotiation: ascended and descended  NT  TBD   ROM BUE:  Defer to OT  BLE:  limited B knee extension      Strength BUE:  Defer to OT  BLE:  3/5   Improve 1 MMT   Balance Sitting EOB:  Shannon  Dynamic Standing:  ModA x2  Sitting EOB:  SBA  Dynamic Standing:  Shannon      Pt is A & O x 3 (Pt oriented to self, time, location) Pt lacks insight to situation and deficits. Sensation:  denies numbness and tingling   Edema:  B LE     Vitals:  BP /77  Pt asymptomatic throughout session. Therapeutic Exercises:  none this session. Patient education  Pt educated on role of PT, and safety with mobility. Patient response to education:   Pt verbalized understanding Pt demonstrated skill Pt requires further education in this area   Yes  Yes, cues  Reinforce      ASSESSMENT:    Conditions Requiring Skilled Therapeutic Intervention:    [x]Decreased strength     [x]Decreased ROM  [x]Decreased functional mobility  [x]Decreased balance   [x]Decreased endurance   [x]Decreased posture  []Decreased sensation  []Decreased coordination   []Decreased vision  [x]Decreased safety awareness   [x]Increased pain       Comments:  Pt was supine in bed and agreeable to PT evaluation upon arrival. Pt completed supine to sit transfer with assistance. Pt sat EOB and completed dynamic activities with assistance ~8 min. Pt completed STS x3 with assistance and cues for posture. Pt statically stood up to 15 seconds. Pt unable to attempt further mobility at this time d/t fatigue. Pt returned to supine in bed with assistance. Pt educated on importance of increasing mobility and building tolerance. Pt was left in supine with all needs met at conclusion of session. Bed alarm activated. Patient would benefit from continued skilled PT to maximize functional mobility independence.        Treatment:  Patient practiced and was instructed in the following treatment:    Bed Mobility: Verbal cues for proper positioning and sequencing to perform bed mobility to facilitate maximum independence. Sup <> sit with assistance. Rolling for linen adjustment. Transfers: Verbal cues for proper positioning and sequencing to perform transfers safely with maximum independence. STS x3 with assistance. Skillful positioning in bed to protect skin and joint integrity. B LE elevated. Pt education for safety with mobility. Vitals and symptoms monitored during session. Pt's/ family goals   1. Get better    Prognosis is good for reaching above PT goals. Patient and or family understand(s) diagnosis, prognosis, and plan of care. Yes     PHYSICAL THERAPY PLAN OF CARE:    PT POC is established based on physician order and patient diagnosis     Referring provider/PT Order:    03/15/23 0115  PT evaluation and treat  Start:  03/15/23 0115,   End:  03/15/23 0115,   ONE TIME,   Standing Count:  1 Occurrences,   R         Soraida Hernandez MD     Diagnosis:  Wound infection [T14. 8XXA, L08.9]  Sepsis ([address] [A41.9]  Fecal impaction ([address] [K56.41]  Open wound of right knee, leg, and ankle, initial encounter [S81.001A, S81.801A, S91.001A]  Specific instructions for next treatment:  progress functional mobility as tolerated.      Current Treatment Recommendations:     [x] Strengthening to improve independence with functional mobility   [] ROM to improve independence with functional mobility   [x] Balance Training to improve static/dynamic balance and to reduce fall risk  [x] Endurance Training to improve activity tolerance during functional mobility   [x] Transfer Training to improve safety and independence with all functional transfers   [x] Gait Training to improve gait mechanics, endurance and assess need for appropriate assistive device  [] Stair Training in preparation for safe discharge home and/or into the community   [x] Positioning to prevent skin breakdown and contractures  [x] Safety and Education Training   [x] Patient/Caregiver Education   [x] HEP  [] Other     PT long term treatment goals are located in above grid    Frequency of treatments: 2-5x/week x 1-2 weeks. Time in  1445  Time out  1510    Total Treatment Time  10 minutes     Evaluation Time includes thorough review of current medical information, gathering information on past medical history/social history and prior level of function, completion of standardized testing/informal observation of tasks, assessment of data and education on plan of care and goals.     CPT codes:  [x] Low Complexity PT evaluation 73278  [] Moderate Complexity PT evaluation 40034  [] High Complexity PT evaluation 45031  [] PT Re-evaluation 90897  [] Gait training 76067 0 minutes  [] Manual therapy 42457 0 minutes  [x] Therapeutic activities 52273 10 minutes  [] Therapeutic exercises 52512 0 minutes  [] Neuromuscular reeducation 44215 0 minutes       Luis Martins PT, DPT  [id number]

## 2023-03-18 VITALS
HEART RATE: 77 BPM | DIASTOLIC BLOOD PRESSURE: 75 MMHG | HEIGHT: 61 IN | OXYGEN SATURATION: 98 % | WEIGHT: 186.2 LBS | TEMPERATURE: 97 F | RESPIRATION RATE: 16 BRPM | SYSTOLIC BLOOD PRESSURE: 163 MMHG | BODY MASS INDEX: 35.16 KG/M2

## 2023-03-18 PROBLEM — R41.0 DELIRIUM: Status: RESOLVED | Noted: 2023-03-16 | Resolved: 2023-03-18

## 2023-03-18 PROBLEM — A41.9 SEPSIS (HCC): Status: RESOLVED | Noted: 2023-03-14 | Resolved: 2023-03-18

## 2023-03-18 LAB
ANION GAP SERPL CALCULATED.3IONS-SCNC: 12 MMOL/L (ref 7–16)
BACTERIA BLD CULT: ABNORMAL
BUN SERPL-MCNC: 12 MG/DL (ref 6–23)
CALCIUM SERPL-MCNC: 9.4 MG/DL (ref 8.6–10.2)
CHLORIDE SERPL-SCNC: 98 MMOL/L (ref 98–107)
CO2 SERPL-SCNC: 28 MMOL/L (ref 22–29)
CREAT SERPL-MCNC: 3.5 MG/DL (ref 0.5–1)
ERYTHROCYTE [DISTWIDTH] IN BLOOD BY AUTOMATED COUNT: 13.3 FL (ref 11.5–15)
GLUCOSE SERPL-MCNC: 97 MG/DL (ref 74–99)
HCT VFR BLD AUTO: 34.1 % (ref 34–48)
HGB BLD-MCNC: 10.7 G/DL (ref 11.5–15.5)
MAGNESIUM SERPL-MCNC: 2 MG/DL (ref 1.6–2.6)
MCH RBC QN AUTO: 34.2 PG (ref 26–35)
MCHC RBC AUTO-ENTMCNC: 31.4 % (ref 32–34.5)
MCV RBC AUTO: 108.9 FL (ref 80–99.9)
METER GLUCOSE: 104 MG/DL (ref 74–99)
METER GLUCOSE: 106 MG/DL (ref 74–99)
ORGANISM: ABNORMAL
ORGANISM: ABNORMAL
PHOSPHATE SERPL-MCNC: 3.2 MG/DL (ref 2.5–4.5)
PLATELET # BLD AUTO: 223 E9/L (ref 130–450)
PMV BLD AUTO: 10.1 FL (ref 7–12)
POTASSIUM SERPL-SCNC: 4.3 MMOL/L (ref 3.5–5)
RBC # BLD AUTO: 3.13 E12/L (ref 3.5–5.5)
SARS-COV-2 RDRP RESP QL NAA+PROBE: NOT DETECTED
SODIUM SERPL-SCNC: 138 MMOL/L (ref 132–146)
WBC # BLD: 8.5 E9/L (ref 4.5–11.5)

## 2023-03-18 PROCEDURE — 6370000000 HC RX 637 (ALT 250 FOR IP): Performed by: INTERNAL MEDICINE

## 2023-03-18 PROCEDURE — 85027 COMPLETE CBC AUTOMATED: CPT

## 2023-03-18 PROCEDURE — 82962 GLUCOSE BLOOD TEST: CPT

## 2023-03-18 PROCEDURE — 36415 COLL VENOUS BLD VENIPUNCTURE: CPT

## 2023-03-18 PROCEDURE — 6370000000 HC RX 637 (ALT 250 FOR IP): Performed by: FAMILY MEDICINE

## 2023-03-18 PROCEDURE — 6370000000 HC RX 637 (ALT 250 FOR IP): Performed by: NURSE PRACTITIONER

## 2023-03-18 PROCEDURE — 80048 BASIC METABOLIC PNL TOTAL CA: CPT

## 2023-03-18 PROCEDURE — 87635 SARS-COV-2 COVID-19 AMP PRB: CPT

## 2023-03-18 PROCEDURE — 6360000002 HC RX W HCPCS: Performed by: FAMILY MEDICINE

## 2023-03-18 PROCEDURE — 84100 ASSAY OF PHOSPHORUS: CPT

## 2023-03-18 PROCEDURE — 83735 ASSAY OF MAGNESIUM: CPT

## 2023-03-18 PROCEDURE — 2580000003 HC RX 258: Performed by: FAMILY MEDICINE

## 2023-03-18 RX ORDER — DIVALPROEX SODIUM 125 MG/1
125 CAPSULE, COATED PELLETS ORAL EVERY 8 HOURS SCHEDULED
Qty: 60 CAPSULE | Refills: 3 | DISCHARGE
Start: 2023-03-18 | End: 2023-03-23

## 2023-03-18 RX ORDER — AMOXICILLIN AND CLAVULANATE POTASSIUM 500; 125 MG/1; MG/1
1 TABLET, FILM COATED ORAL EVERY 12 HOURS SCHEDULED
Qty: 14 TABLET | Refills: 0 | DISCHARGE
Start: 2023-03-18 | End: 2023-03-25

## 2023-03-18 RX ORDER — PROCHLORPERAZINE MALEATE 10 MG
10 TABLET ORAL EVERY 6 HOURS PRN
Qty: 120 TABLET | Refills: 3 | DISCHARGE
Start: 2023-03-18

## 2023-03-18 RX ADMIN — METOPROLOL SUCCINATE 25 MG: 25 TABLET, EXTENDED RELEASE ORAL at 09:50

## 2023-03-18 RX ADMIN — SENNOSIDES 17.2 MG: 8.6 TABLET, FILM COATED ORAL at 09:48

## 2023-03-18 RX ADMIN — BUSPIRONE HYDROCHLORIDE 10 MG: 5 TABLET ORAL at 09:48

## 2023-03-18 RX ADMIN — SEVELAMER CARBONATE 800 MG: 800 TABLET, FILM COATED ORAL at 09:48

## 2023-03-18 RX ADMIN — Medication 1000 UNITS: at 09:49

## 2023-03-18 RX ADMIN — ESCITALOPRAM OXALATE 10 MG: 10 TABLET, FILM COATED ORAL at 09:48

## 2023-03-18 RX ADMIN — SEVELAMER CARBONATE 800 MG: 800 TABLET, FILM COATED ORAL at 12:02

## 2023-03-18 RX ADMIN — DIVALPROEX SODIUM 125 MG: 125 CAPSULE, COATED PELLETS ORAL at 06:43

## 2023-03-18 RX ADMIN — HEPARIN SODIUM 5000 UNITS: 10000 INJECTION INTRAVENOUS; SUBCUTANEOUS at 06:44

## 2023-03-18 RX ADMIN — ANTI-FUNGAL POWDER MICONAZOLE NITRATE TALC FREE: 1.42 POWDER TOPICAL at 09:51

## 2023-03-18 RX ADMIN — Medication 10 ML: at 09:49

## 2023-03-18 RX ADMIN — AMOXICILLIN AND CLAVULANATE POTASSIUM 1 TABLET: 500; 125 TABLET, FILM COATED ORAL at 09:48

## 2023-03-18 ASSESSMENT — PAIN SCALES - GENERAL
PAINLEVEL_OUTOF10: 0
PAINLEVEL_OUTOF10: 0

## 2023-03-18 NOTE — PLAN OF CARE
Problem: Safety - Adult  Goal: Free from fall injury  3/18/2023 1345 by Danial Smith RN  Outcome: Completed  3/18/2023 1345 by Danial Smith RN  Outcome: Progressing     Problem: Discharge Planning  Goal: Discharge to home or other facility with appropriate resources  3/18/2023 1345 by Danial Smith RN  Outcome: Completed  3/18/2023 1345 by Danial Smith RN  Outcome: Progressing     Problem: Skin/Tissue Integrity  Goal: Absence of new skin breakdown  Description: 1. Monitor for areas of redness and/or skin breakdown  2. Assess vascular access sites hourly  3. Every 4-6 hours minimum:  Change oxygen saturation probe site  4. Every 4-6 hours:  If on nasal continuous positive airway pressure, respiratory therapy assess nares and determine need for appliance change or resting period.   3/18/2023 1345 by Danial Smith RN  Outcome: Completed  3/18/2023 1345 by Danial Smith RN  Outcome: Progressing     Problem: ABCDS Injury Assessment  Goal: Absence of physical injury  3/18/2023 1345 by Danial Smith RN  Outcome: Completed  3/18/2023 1345 by Danial Smith RN  Outcome: Progressing     Problem: Risk for Elopement  Goal: Patient will not exit the unit/facility without proper excort  3/18/2023 1345 by Danial Smith RN  Outcome: Completed  3/18/2023 1345 by Danial Smith RN  Outcome: Progressing     Problem: Nutrition Deficit:  Goal: Optimize nutritional status  3/18/2023 1345 by Danial Smith RN  Outcome: Completed  3/18/2023 1345 by Danial Smith RN  Outcome: Progressing     Problem: Chronic Conditions and Co-morbidities  Goal: Patient's chronic conditions and co-morbidity symptoms are monitored and maintained or improved  3/18/2023 1345 by Danial Smith RN  Outcome: Completed  3/18/2023 1345 by Danial Smith RN  Outcome: Progressing     Problem: Pain  Goal: Verbalizes/displays adequate comfort level or baseline comfort level  3/18/2023 1345 by Danial Smith RN  Outcome: Completed  3/18/2023 1345 by Kenrick Celaya, RN  Outcome: Progressing

## 2023-03-18 NOTE — DISCHARGE INSTR - COC
Continuity of Care Form    Patient Name: Olivia uQintanilla   :  1950  MRN:  [id number]    Admit date:  3/14/2023  Discharge date:  3-18-23    Code Status Order: DNR-CCA   Advance Directives:     Admitting Physician:  Mariano Quick MD  PCP: Mariano Quick MD    Discharging Nurse: [facility] Unit/Room#: 5928/8079-X  Discharging Unit Phone Number: [phone]    Emergency Contact:   Extended Emergency Contact Information  Primary Emergency Contact: Johnson Mcguire  Address: [address] [facility] Phone: [phone]  Mobile Phone: [phone]  Relation: Child   needed? No  Secondary Emergency Contact: Marisolgermain Meet [facility] [address] [facility] Phone: [phone]  Mobile Phone: [phone]  Relation: Child   needed? No    Past Surgical History:  Past Surgical History:   Procedure Laterality Date    ABDOMEN SURGERY N/A 2020    SACRAL WOUND DEBRIDEMENT CALL   WITH TIME AVAIL AM performed by Zak Dotson MD at [address]   x3    CHOLECYSTECTOMY  3/31/16    Laparoscopic-Dr. Froylan Ninor       for kidney stones    DIALYSIS FISTULA CREATION Left 2021    INSERTION FISTULA LEFT ARM performed by Jered Fritz MD at [address] Right 2021    REVISION AV FISTULA LEFT ARM performed by Jered Fritz MD at [place] [address] Right 2022    RIGHT DISTAL FEMUR OPEN REDUCTION INTERNAL FIXATION ++SYNTHES++ performed by Manoj Harley MD at [facility]       right     UPPER GASTROINTESTINAL ENDOSCOPY  2.18.15    Dr. Abby Denton Findings: Mild Gastrits and Duodenitis, 2cm Hiatal Hernia    UPPER GASTROINTESTINAL ENDOSCOPY N/A 2020    EGD CONTROL HEMORRHAGE performed by Zak Dotson MD at [address] N/A 2020    EGD BEDSIDE performed by Nelson Vallejo MD at [address] VASCULAR SURGERY N/A 5/6/2021    INSERTION TUNNELED DIALYSIS CATHETER performed by Gamaliel Flores MD at [address] Left 11/4/2022    LEFT DISTAL RADIUS OPEN REDUCTION INTERNAL FIXATION    ++SYNTHES++ performed by Jesus Huddleston MD at [address]       Immunization History:   Immunization History   Administered Date(s) Administered    COVID-19, PFIZER PURPLE top, DILUTE for use, (age 15 y+), 30mcg/0.3mL 01/05/2021, 01/26/2021       Active Problems:  Patient Active Problem List   Diagnosis Code    Neurologic gait dysfunction R26.9    Diabetes mellitus ([address] E11.9    Hypertension I10    Arthritis M19.90    Knee problem M25.9    Anemia due to stage 3 chronic kidney disease N18.30, D63.1    Primary osteoarthritis of both knees M17.0    Moderate protein-calorie malnutrition (HCC) E44.0    Pressure injury of sacral region, stage 4 (HCC) L89.154    Pressure injury of right hip, stage 3 (HCC) L89.213    Left bundle branch block I44.7    Moderate obesity E66.8    Failure to thrive in adult R62.7    Pressure injury of sacral region, stage 3 (HCC) L89.153    Decreased dorsalis pedis pulse R09.89    ESRD on hemodialysis ([address] N18.6, Z99.2    Closed bicondylar fracture of distal femur, right, initial encounter ([address] S72.421A, S72.431A    Other fracture of right femur, initial encounter for closed fracture ([address] S72.8X1A    Closed fracture of distal ends of left radius and ulna S52.502A, S52.602A    Closed bicondylar fracture of distal end of right femur ([address] S72.421A, S72.431A    Pneumonia due to infectious organism, unspecified laterality, unspecified part of lung J18.9    Pneumonia due to organism J18.9    Wound infection T14. 8XXA, L08.9       Isolation/Infection:   Isolation            No Isolation          Patient Infection Status       Infection Onset Added Last Indicated Last Indicated By Review Planned Expiration Resolved Resolved By    None active    Resolved    COVID-19 (Rule Out) 02/15/23 02/15/23 02/15/23 COVID-19 & Influenza Combo (Ordered)   02/15/23 Rule-Out Test Resulted    COVID-19 (Rule Out) 23 COVID-19 & Influenza Combo (Ordered)   23 Rule-Out Test Resulted    COVID-19 22 COVID-19, Rapid   22     COVID-19 (Rule Out) 22 COVID-19, Rapid (Ordered)   22 Rule-Out Test Resulted    COVID-19 (Rule Out) 22 COVID-19, Rapid (Ordered)   22 Rule-Out Test Resulted    COVID-19 (Rule Out) 21 COVID-19 (Ordered)   21 Rule-Out Test Resulted    COVID-19 (Rule Out) 10/09/20 10/09/20 10/09/20 COVID-19 (Ordered)   10/09/20 Rule-Out Test Resulted    COVID-19 (Rule Out) 20 COVID-19 (Ordered)   20     VRE 20 Culture, Wound   10/12/20 Felecia Cano RN    Enterococcus faecium Wound-Coccyx 20    MDRO (multi-drug resistant organism) 20 Culture, Wound   10/12/20 Felecia Cano RN    COVID-19 (Rule Out) 20 COVID-19 (Ordered)   20 Rule-Out Test Resulted    DETECTED 2020    COVID-19 (Rule Out) 05/14/20 05/15/20 05/14/20 Covid-19 Ambulatory (Ordered)   20 Rule-Out Test Resulted    Detected 2020    COVID-19 (Rule Out) 20 COVID-19 (Ordered)   20 Rule-Out Test Resulted    DETECTED 2020    C-diff Rule Out 20 CLOSTRIDIUM DIFFICILE EIA (Ordered)   20 Farhat Cain RN    Does not meet criteria    MRSA 20 Culture, Wound   20 Tamara Figueroa RN    Wound buttock 5/3/20    COVID-19 20 COVID-19   20     Detected 2020, 2020, 2020, 5/3/2020    COVID-19 (Rule Out) 20 COVID-19 (Ordered)   20 Rule-Out Test Resulted    DETECTED 5/3/2020            Nurse Assessment:  Last Vital Signs:  BP (!) 163/75   Pulse 77   Temp 97.5 °F (36.4 °C) (Temporal)   Resp 16   Ht 5' 1\" (1.549 m)   Wt 186 lb 3.2 oz (84.5 kg)   SpO2 98%   BMI 35.18 kg/m²     Last documented pain score (0-10 scale): Pain Level: 0  Last Weight:   Wt Readings from Last 1 Encounters:   03/18/23 186 lb 3.2 oz (84.5 kg)     Mental Status:  oriented, alert, and intermittent confusion    IV Access:  Dialysis fistula LFA    Nursing Mobility/ADLs:  Walking   Dependent  Transfer  Assisted  Bathing  Assisted  Dressing  Assisted  Toileting  Dependent  Feeding  Independent  Med Admin  Assisted  Med Delivery   whole    Wound Care Documentation and Therapy:  Wound 03/15/23 Knee Right;Lateral (Active)   Wound Image   03/16/23 1507   Dressing Status Other (Comment) 03/17/23 2000   Wound Cleansed Cleansed with saline 03/17/23 1130   Dressing/Treatment Alginate 03/17/23 1130   Wound Length (cm) 2 cm 03/16/23 1507   Wound Width (cm) 1 cm 03/16/23 1507   Wound Depth (cm) 0.7 cm 03/16/23 1507   Wound Surface Area (cm^2) 2 cm^2 03/16/23 1507   Change in Wound Size % (l*w) 52.38 03/16/23 1507   Wound Volume (cm^3) 1.4 cm^3 03/16/23 1507   Wound Healing % -233 03/16/23 1507   Wound Assessment Pink/red;Purple/maroon 03/17/23 1130   Drainage Amount None 03/17/23 1130   Carmen-wound Assessment Dry/flaky 03/17/23 1130   Number of days: 2       Wound 03/15/23 Sacrum (Active)   Wound Image   03/16/23 1507   Wound Etiology Pressure Stage 4 03/17/23 1130   Dressing Status Other (Comment) 03/17/23 2000   Wound Cleansed Cleansed with saline 03/17/23 1130   Dressing/Treatment Alginate;Dry dressing;ABD 03/17/23 1130   Wound Length (cm) 6.6 cm 03/16/23 1507   Wound Width (cm) 4 cm 03/16/23 1507   Wound Depth (cm) 1.6 cm 03/16/23 1507   Wound Surface Area (cm^2) 26.4 cm^2 03/16/23 1507   Change in Wound Size % (l*w) 8.01 03/16/23 1507   Wound Volume (cm^3) 42.24 cm^3 03/16/23 1507   Wound Healing % 58 03/16/23 1507   Undermining Starts ___ O'Clock 3 03/16/23 1507 Undermining Ends___ O'Clock 5 03/16/23 1507   Undermining Maxium Distance (cm) 1.3 03/16/23 1507   Wound Assessment Pink/red 03/17/23 1130   Drainage Amount Small 03/17/23 1130   Drainage Description Serosanguinous 03/17/23 1130   Odor None 03/17/23 1130   Carmen-wound Assessment Other (Comment) 03/17/23 1130   Number of days: 2       Wound 03/17/23 Lateral;Left breast (Active)   Wound Image   03/17/23 1417   Dressing/Treatment Pharmaceutical agent (see MAR) 03/17/23 1417   Wound Length (cm) 0.8 cm 03/17/23 1417   Wound Width (cm) 0.6 cm 03/17/23 1417   Wound Depth (cm) 0.1 cm 03/17/23 1417   Wound Surface Area (cm^2) 0.48 cm^2 03/17/23 1417   Change in Wound Size % (l*w) 45.45 03/17/23 1417   Wound Volume (cm^3) 0.048 cm^3 03/17/23 1417   Wound Healing % 45 03/17/23 1417   Wound Assessment Pink/red 03/17/23 1417   Drainage Amount None 03/17/23 1417   Wound Thickness Description not for Pressure Injury Partial thickness 03/17/23 1417   Number of days: 0        Elimination:  Continence: Bowel: No  Bladder: No  Urinary Catheter: None   Colostomy/Ileostomy/Ileal Conduit: No       Date of Last BM: 3-17-23    Intake/Output Summary (Last 24 hours) at 3/18/2023 0919  Last data filed at 3/18/2023 0855  Gross per 24 hour   Intake 420 ml   Output 1500 ml   Net -1080 ml     I/O last 3 completed shifts: In: 300   Out: 1500     Safety Concerns: At Risk for Falls    Impairments/Disabilities:      Dialysis fistula LFA    Nutrition Therapy:  Current Nutrition Therapy:   - Oral Diet:  Carb Control 3 carbs/meal (1500kcals/day)    Routes of Feeding: Oral  Liquids: Thin Liquids  Daily Fluid Restriction: no  Last Modified Barium Swallow with Video (Video Swallowing Test): not done    Treatments at the Time of Hospital Discharge:   Respiratory Treatments:   Oxygen Therapy:  is not on home oxygen therapy.   Ventilator:    - No ventilator support    Rehab Therapies: Physical Therapy and Occupational Therapy  Weight Bearing Status/Restrictions: No weight bearing restrictions  Other Medical Equipment (for information only, NOT a DME order):  wheelchair  Other Treatments: ***    Patient's personal belongings (please select all that are sent with patient):  Phone &     RN SIGNATURE:  Electronically signed by Kalina Argueta RN on 3/18/23 at 12:46 PM EDT    CASE MANAGEMENT/SOCIAL WORK SECTION    Inpatient Status Date: ***    Readmission Risk Assessment Score:  Readmission Risk              Risk of Unplanned Readmission:  39           Discharging to Facility/ Agency   Name:   Address:  Phone:  Fax:    Dialysis Facility (if applicable)   Name:  Address:  Dialysis Schedule:  Phone:  Fax:    / signature: {Esignature:[id number]}    PHYSICIAN SECTION    Prognosis: Good    Condition at Discharge: Stable    Rehab Potential (if transferring to Rehab): Fair    Recommended Labs or Other Treatments After Discharge: cbc cmp    Physician Certification: I certify the above information and transfer of Omar Strickland  is necessary for the continuing treatment of the diagnosis listed and that she requires [place] for greater 30 days.      Update Admission H&P: No change in H&P    PHYSICIAN SIGNATURE:  Electronically signed by Anisa Nash MD on 3/18/23 at 9:20 AM EDT

## 2023-03-18 NOTE — CARE COORDINATION
Social Work Discharge Planning:  Discharge order noted. Patient is set-up to leave today going to [facility] at 1 PM. Patient will need a COVID Test.SW informed patient's son Diego Roman.  Electronically signed by SHANTA Perez on 3/18/2023 at 10:28 AM

## 2023-03-18 NOTE — CARE COORDINATION
Called 3000- spoke with Chantal Gutierrez of discharge time and need of covid also called [facility] [phone]  and spoke with  Gabino Rust  and mónica of transport time. Electronically signed by Dafne Dave RN on 3/18/2023 at 10:47 AM

## 2023-03-18 NOTE — PROGRESS NOTES
[facility]  Nephrology Progress Note    Patient's Name: Jorge Matute    History of Present Illness from 3/15 Consult Note:  \"Marisela Davis is a 67 y.o. female with a past medical history of ESRD on hemodialysis, COVID, hypertension, and sacral pressure ulcer. She presented to the ED on 3/14 with altered mental status. Vital signs on 3/14 include temperature 98.2, respirations 20, pulse 98, /74, and she was 98% SPO2. Lab data on 3/14 includes creatinine 3.4, lactic acid 2.7, WBCs 12.7 K, and hemoglobin 10.1. She had a chest x-ray on 3/14 which showed vague patchy airspace disease seen within the right upper lobe and right lower lobe. X-ray right knee 3/14 no acute bony abnormalities or hardware complications, soft tissue swelling and questionable soft tissue gas along the lateral aspect. CT of the head no acute abnormality. We were consulted to see the patient for dialysis. Patient is known to our service and dialyzes at the dialysis den at [facility]. At present, patient was seen and examined. She reports that she feels hungry this morning. She denies any chest pain or shortness of breath. She denies any abdominal pain, nausea, vomiting, or diarrhea. She denies any headaches or dizziness. She denies any fevers or chills. \"    Subjective:    3/18: Patient was seen and examined. She reports that she feels good. She denies any chest pain, shortness of breath, or cough. She denies any nausea.     PMH:    Past Medical History:   Diagnosis Date    Anemia in chronic kidney disease     Anxiety and depression     Arthritis     Chronic pain syndrome     COVID-19 05/2020    COVID-19     Decreased dorsalis pedis pulse 10/25/2022    Diabetes mellitus ([address]     Dysphagia, oral phase     ESRD on hemodialysis ([address] 10/25/2022    GERD (gastroesophageal reflux disease)     Hemod[facility] patient ([facility]     M-W-F    Hyperkalemia     Hypertension     Hypertensive kidney disease with stage 4 chronic kidney disease ([address]     Insomnia     Kidney stones     MDD (major depressive disorder)     Moderate protein-calorie malnutrition (HCC)     Morbid obesity (HCC)     Muscle weakness (generalized)     Obesity     Pressure injury of sacral region, stage 3 ([address] 10/25/2022    Pressure ulcer of sacral region     Sacral pressure ulcer 08/03/2021    stage 4    Unspecified osteoarthritis, unspecified site     Weakness generalized        Patient Active Problem List   Diagnosis    Neurologic gait dysfunction    Diabetes mellitus ([address]    Hypertension    Arthritis    Knee problem    Anemia due to stage 3 chronic kidney disease    Primary osteoarthritis of both knees    Moderate protein-calorie malnutrition (HCC)    Pressure injury of sacral region, stage 4 (HCC)    Pressure injury of right hip, stage 3 (HCC)    Left bundle branch block    Moderate obesity    Failure to thrive in adult    Pressure injury of sacral region, stage 3 (HCC)    Decreased dorsalis pedis pulse    ESRD on hemodialysis ([address]    Closed bicondylar fracture of distal femur, right, initial encounter ([address]    Other fracture of right femur, initial encounter for closed fracture ([address]    Closed fracture of distal ends of left radius and ulna    Closed bicondylar fracture of distal end of right femur ([address]    Pneumonia due to infectious organism, unspecified laterality, unspecified part of lung    Pneumonia due to organism    Wound infection    Sepsis ([address]    Hyperactive Delirium       Diet:    ADULT DIET;  Regular; 3 carb choices (45 gm/meal)    Meds:     amoxicillin-clavulanate  1 tablet Oral 2 times per day    divalproex  125 mg Oral 3 times per day    melatonin  5 mg Oral Daily    heparin (porcine)  5,000 Units SubCUTAneous Q8H    miconazole   Topical BID    busPIRone  10 mg Oral Daily    [Held by provider] epoetin derek-epbx  10,000 Units SubCUTAneous Once per day on Tue Thu Sat    escitalopram  10 mg Oral Daily    sevelamer  800 mg Oral TID WC    insulin glargine-yfgn  6 Units SubCUTAneous Nightly    metoprolol succinate  25 mg Oral Daily    midodrine  15 mg Oral Q MWF    senna  2 tablet Oral BID    traZODone  25 mg Oral Nightly    Vitamin D  1,000 Units Oral Daily    sodium chloride flush  5-40 mL IntraVENous 2 times per day    insulin lispro  0-8 Units SubCUTAneous TID WC    insulin lispro  0-4 Units SubCUTAneous Nightly        sodium chloride      dextrose         Meds prn:     prochlorperazine, ziprasidone (GEODON) in sterile water injection, hydrALAZINE, bisacodyl, sodium chloride flush, sodium chloride, acetaminophen **OR** acetaminophen, glucose, dextrose bolus **OR** dextrose bolus, glucagon (rDNA), dextrose, polyethylene glycol, sodium chloride flush    Meds prior to admission:     No current facility-administered medications on file prior to encounter.      Current Outpatient Medications on File Prior to Encounter   Medication Sig Dispense Refill    traMADol (ULTRAM) 50 MG tablet Take 50 mg by mouth every 8 hours as needed for Pain.      vitamin C (ASCORBIC ACID) 500 MG tablet Take 500 mg by mouth 2 times daily      glucagon, rDNA, 1 MG injection Inject 1 mg into the muscle as needed for Low blood sugar      ferric citrate (AURYXIA) 1  MG(Fe) TABS tablet Take 420 mg by mouth 3 times daily (with meals)      benzonatate (TESSALON) 100 MG capsule Take 100 mg by mouth 3 times daily as needed for Cough      bisacodyl (DULCOLAX) 10 MG suppository Place 10 mg rectally daily as needed for Constipation      glucose (GLUTOSE) 40 % GEL Take 15 g by mouth as needed (hypoglycemia)      guaiFENesin 200 MG/5ML LIQD Take 200 mg by mouth every 6 hours as needed (cough)      insulin lispro, 1 Unit Dial, (HUMALOG/ADMELOG) 100 UNIT/ML SOPN Inject 0-10 Units into the skin 3 times daily (before meals) *Per Sliding Scale*  160-200 = 2 units  201-250 = 4 units  251-300 = 6 units  301-350 = 8 units  351-400 = 10 units + notify MD      lactulose (CHRONULAC) 10 GM/15ML solution Take 10 g by mouth daily as needed (constipation)      busPIRone (BUSPAR) 10 MG tablet Take 10 mg by mouth daily      polyethylene glycol (GLYCOLAX) 17 g packet Take 17 g by mouth daily as needed for Constipation      oxyCODONE-acetaminophen (PERCOCET) 5-325 MG per tablet Take 1 tablet by mouth every 6 hours as needed for Pain.      epoetin derek-epbx (RETACRIT) 77409 UNIT/ML SOLN injection Inject 10,000 Units into the skin See Admin Instructions Given Tuesday,Thursday,Saturday      senna (SENOKOT) 8.6 MG tablet Take 2 tablets by mouth 2 times daily      traZODone (DESYREL) 50 MG tablet Take 50 mg by mouth nightly      acetaminophen (TYLENOL) 325 MG tablet Take 650 mg by mouth every 4 hours as needed for Pain or Fever      vitamin D (CHOLECALCIFEROL) 25 MCG (1000 UT) TABS tablet Take 1,000 Units by mouth daily      ondansetron (ZOFRAN) 4 MG tablet Take 4 mg by mouth every 4 hours as needed for Nausea or Vomiting      insulin glargine (LANTUS) 100 UNIT/ML injection vial Inject 6 Units into the skin nightly 10 mL 3    midodrine (PROAMATINE) 5 MG tablet Take 15 mg by mouth See Admin Instructions Given Monday,Wednesday,Friday      metoprolol succinate (TOPROL XL) 25 MG extended release tablet Take 1 tablet by mouth daily 30 tablet 3    escitalopram (LEXAPRO) 10 MG tablet Take 10 mg by mouth daily      gabapentin (NEURONTIN) 300 MG capsule Take 300 mg by mouth daily. Allergies:    Patient has no known allergies. Social History:     reports that she quit smoking about 11 years ago. Her smoking use included cigarettes. She has a 30.00 pack-year smoking history. She has never used smokeless tobacco. She reports that she does not drink alcohol and does not use drugs.     Family History:         Problem Relation Age of Onset    Cancer Sister      Physical Exam:      Patient Vitals for the past 24 hrs:   BP Temp Temp src Pulse Resp SpO2 Weight   03/18/23 0527 -- -- -- -- -- -- 186 lb 3.2 oz (84.5 kg)   03/18/23 0200 (!) 160/68 -- -- 78 18 -- --   03/17/23 2035 (!) 186/90 -- -- -- 18 -- --   03/17/23 2000 (!) 200/86 97.5 °F (36.4 °C) Temporal 72 18 96 % --   03/17/23 1105 (!) 148/65 (!) 96.2 °F (35.7 °C) Oral -- 18 96 % --   03/17/23 1049 (!) 173/53 96.9 °F (36.1 °C) -- 86 18 -- 183 lb 13.8 oz (83.4 kg)   03/17/23 1000 (!) 157/67 -- -- 96 -- -- --   03/17/23 0930 (!) 163/60 -- -- 82 -- -- --   03/17/23 0900 (!) 149/48 -- -- 82 -- -- --           Intake/Output Summary (Last 24 hours) at 3/18/2023 0850  Last data filed at 3/17/2023 1049  Gross per 24 hour   Intake 300 ml   Output 1500 ml   Net -1200 ml     General: Awake, alert, no acute distress  Neck: No JVD noted  Lungs: Clear bilaterally upper, diminished to the bases bilaterally. Unlabored  CV: Regular rate and rhythm. No rub  Abd: Soft, nontender, nondistended. Active bowel sounds  Skin: Warm and dry. No rash on exposed extremities  Ext: no edema   Neuro: Awake, answers questions appropriately    Data:    Recent Labs     03/16/23  0739 03/17/23  0647 03/18/23  0625   WBC 7.6 8.2 8.5   HGB 9.9* 10.5* 10.7*   HCT 31.0* 33.2* 34.1   .8* 109.6* 108.9*    191 223         Recent Labs     03/16/23  0739 03/17/23  0647 03/18/23  0625    141 138   K 4.3 4.1 4.3   CL 99 101 98   CO2 30* 28 28   CREATININE 3.4* 4.9* 3.5*   BUN 15 22 12   LABGLOM 14 9 13   GLUCOSE 89 88 97   CALCIUM 9.3 9.5 9.4   PHOS 3.9 4.3 3.2   MG 2.1 2.2 2.0         Vit D, 25-Hydroxy   Date Value Ref Range Status   11/11/2022 32 30 - 100 ng/mL Final     Comment:     <20 ng/mL. ........... Charles Hidden Deficient  20-30 ng/mL. ......... Charles Hidden Insufficient   ng/mL. ........ Charles Hidden Sufficient  >100 ng/mL. .......... Charles Hidden Toxic         PTH   Date Value Ref Range Status   11/06/2022 466 (H) 15 - 65 pg/mL Final       No results for input(s): ALT, AST, ALKPHOS, BILITOT, BILIDIR in the last 72 hours. No results for input(s): LABALBU in the last 72 hours.       Ferritin   Date Value Ref Range Status   05/05/2021 767 ng/mL Final     Comment:     FERRITIN Reference Ranges:  Adult Males   20 - 60 years:    30 - 400 ng/mL  Adult females 16 - 61 years:    15 - 150 ng/mL  Adults greater than 60 years:   no established reference range  Pediatrics:                     no established reference range       Iron   Date Value Ref Range Status   05/05/2021 74 37 - 145 mcg/dL Final     TIBC   Date Value Ref Range Status   05/05/2021 116 (L) 250 - 450 mcg/dL Final       Vitamin B-12   Date Value Ref Range Status   05/05/2021 895 211 - 946 pg/mL Final       Folate   Date Value Ref Range Status   05/05/2021 >20.0 4.8 - 24.2 ng/mL Final       Lab Results   Component Value Date/Time    COLORU Yellow 03/14/2023 04:50 PM    NITRU Negative 03/14/2023 04:50 PM    GLUCOSEU 100 03/14/2023 04:50 PM    GLUCOSEU NEGATIVE 08/17/2011 10:30 PM    KETUA Negative 03/14/2023 04:50 PM    UROBILINOGEN 0.2 03/14/2023 04:50 PM    BILIRUBINUR Negative 03/14/2023 04:50 PM    BILIRUBINUR NEGATIVE 08/17/2011 10:30 PM       No results found for: MAGDA, CREURRAN, MACREATRATIO, OSMOU    No components found for: URIC    No results found for: LIPIDPAN    Assessment and Plans:    ESRD on HD  Outpatient at the dialysis den at [facility] HD while inpatient  Left avf    2. Altered mental status  CT of the head no acute abnormality  Management per primary service    3. Wounds  CT abd pelvis 3/14 no significant change in sacral decubitus ulcer  XR right knee 3/14 soft tissue swelling  CT right knee 3/14 chronic fracture distal femoral and cutaneous scarring mild ulceration   Positive blood cultures and wound culture-on antibiotics  Infectious disease and orthopedic surgery consulted    4. Anemia in CKD  Hemoglobin target 10-12  Hemoglobin 10.7 today - at target  No ARUN as hemoglobin>10  Transfuse hemoglobin<7  Monitor H&H    5.   Secondary hyperparathyroidism of renal origin   and phosphorus 3.7 on 3/2 in the outpatient setting  Phos 3.2 today  On Renvela  Monitor labs    6.   Hypertension CKD 5/ESRD  BP goal<140/90  BP above goal  Monitor BPs    Latia Edward, APRN - CNP  Pt seen and examined on rounds with np  Agree with above  For hd mwf  On abx for pos bc  Chronic wounds  Clifford Juares MD

## 2023-03-18 NOTE — PROGRESS NOTES
Subjective: The patient is awake and alert. No problems overnight. Denies chest pain, angina, and dyspnea. Denies abdominal pain. Tolerating diet. No nausea or vomiting. Objective:    BP (!) 163/75   Pulse 77   Temp 97.5 °F (36.4 °C) (Temporal)   Resp 16   Ht 5' 1\" (1.549 m)   Wt 186 lb 3.2 oz (84.5 kg)   SpO2 98%   BMI 35.18 kg/m²     Heart:  RRR, no murmurs, gallops, or rubs.   Lungs:  CTA bilaterally, no wheeze, rales or rhonchi  Abd: bowel sounds present, nontender, nondistended, no masses  Extrem:  No clubbing, cyanosis, or edema    CBC with Differential:    Lab Results   Component Value Date/Time    WBC 8.5 03/18/2023 06:25 AM    RBC 3.13 03/18/2023 06:25 AM    HGB 10.7 03/18/2023 06:25 AM    HCT 34.1 03/18/2023 06:25 AM     03/18/2023 06:25 AM    .9 03/18/2023 06:25 AM    MCH 34.2 03/18/2023 06:25 AM    MCHC 31.4 03/18/2023 06:25 AM    RDW 13.3 03/18/2023 06:25 AM    NRBC 0.0 11/26/2022 07:02 AM    SEGSPCT 71 08/16/2013 05:00 AM    BANDSPCT 1 03/29/2016 07:37 PM    METASPCT 0.9 03/15/2023 07:48 AM    LYMPHOPCT 10.4 03/15/2023 07:48 AM    PROMYELOPCT 0.9 11/07/2022 05:59 AM    MONOPCT 9.6 03/15/2023 07:48 AM    MYELOPCT 0.9 11/26/2022 07:02 AM    BASOPCT 0.9 03/15/2023 07:48 AM    MONOSABS 0.71 03/15/2023 07:48 AM    LYMPHSABS 0.71 03/15/2023 07:48 AM    EOSABS 0.31 03/15/2023 07:48 AM    BASOSABS 0.06 03/15/2023 07:48 AM     CMP:    Lab Results   Component Value Date/Time     03/18/2023 06:25 AM    K 4.3 03/18/2023 06:25 AM    K 4.0 03/15/2023 07:48 AM    CL 98 03/18/2023 06:25 AM    CO2 28 03/18/2023 06:25 AM    BUN 12 03/18/2023 06:25 AM    CREATININE 3.5 03/18/2023 06:25 AM    GFRAA 7 10/12/2022 03:49 AM    LABGLOM 13 03/18/2023 06:25 AM    GLUCOSE 97 03/18/2023 06:25 AM    GLUCOSE 149 10/12/2011 09:59 AM    PROT 6.3 03/15/2023 07:48 AM    LABALBU 3.1 03/15/2023 07:48 AM    LABALBU 4.1 10/12/2011 09:59 AM    CALCIUM 9.4 03/18/2023 06:25 AM    BILITOT 0.4 03/15/2023 07:48 AM    ALKPHOS 58 03/15/2023 07:48 AM    AST 11 03/15/2023 07:48 AM    ALT 6 03/15/2023 07:48 AM        Assessment:    Patient Active Problem List   Diagnosis    Neurologic gait dysfunction    Diabetes mellitus ([address]    Hypertension    Arthritis    Knee problem    Anemia due to stage 3 chronic kidney disease    Primary osteoarthritis of both knees    Moderate protein-calorie malnutrition (HCC)    Pressure injury of sacral region, stage 4 (HCC)    Pressure injury of right hip, stage 3 (HCC)    Left bundle branch block    Moderate obesity    Failure to thrive in adult    Pressure injury of sacral region, stage 3 (HCC)    Decreased dorsalis pedis pulse    ESRD on hemodialysis ([address]    Closed bicondylar fracture of distal femur, right, initial encounter ([address]    Other fracture of right femur, initial encounter for closed fracture ([address]    Closed fracture of distal ends of left radius and ulna    Closed bicondylar fracture of distal end of right femur ([address]    Pneumonia due to infectious organism, unspecified laterality, unspecified part of lung    Pneumonia due to organism    Wound infection       Plan:  Discharge        Tavon Hilario MD  9:19 AM  3/18/2023

## 2023-03-19 LAB — BACTERIA BLD CULT ORG #2: NORMAL

## 2023-03-20 LAB
ALBUMIN SERPL-MCNC: 3.3 G/DL (ref 3.5–5.2)
ALP SERPL-CCNC: 71 U/L (ref 35–104)
ALT SERPL-CCNC: 5 U/L (ref 0–32)
ANION GAP SERPL CALCULATED.3IONS-SCNC: 15 MMOL/L (ref 7–16)
AST SERPL-CCNC: 12 U/L (ref 0–31)
BACTERIA BLD CULT ORG #2: NORMAL
BACTERIA BLD CULT: NORMAL
BILIRUB SERPL-MCNC: 0.3 MG/DL (ref 0–1.2)
BUN SERPL-MCNC: 33 MG/DL (ref 6–23)
CALCIUM SERPL-MCNC: 9.4 MG/DL (ref 8.6–10.2)
CHLORIDE SERPL-SCNC: 104 MMOL/L (ref 98–107)
CO2 SERPL-SCNC: 26 MMOL/L (ref 22–29)
CREAT SERPL-MCNC: 6.2 MG/DL (ref 0.5–1)
ERYTHROCYTE [DISTWIDTH] IN BLOOD BY AUTOMATED COUNT: 13.2 FL (ref 11.5–15)
ERYTHROCYTE [SEDIMENTATION RATE] IN BLOOD BY WESTERGREN METHOD: 41 MM/HR (ref 0–20)
GLUCOSE SERPL-MCNC: 152 MG/DL (ref 74–99)
HBA1C MFR BLD: 4.9 % (ref 4–5.6)
HCT VFR BLD AUTO: 32.5 % (ref 34–48)
HGB BLD-MCNC: 10.5 G/DL (ref 11.5–15.5)
MCH RBC QN AUTO: 35.7 PG (ref 26–35)
MCHC RBC AUTO-ENTMCNC: 32.3 % (ref 32–34.5)
MCV RBC AUTO: 110.5 FL (ref 80–99.9)
PLATELET # BLD AUTO: 216 E9/L (ref 130–450)
PMV BLD AUTO: 11.1 FL (ref 7–12)
POTASSIUM SERPL-SCNC: 5.3 MMOL/L (ref 3.5–5)
PROT SERPL-MCNC: 6.5 G/DL (ref 6.4–8.3)
RBC # BLD AUTO: 2.94 E12/L (ref 3.5–5.5)
SODIUM SERPL-SCNC: 145 MMOL/L (ref 132–146)
VITAMIN D 25-HYDROXY: 49 NG/ML (ref 30–100)
WBC # BLD: 7.2 E9/L (ref 4.5–11.5)

## 2023-03-24 LAB
ALBUMIN SERPL-MCNC: 3.1 G/DL (ref 3.5–5.2)
ALP SERPL-CCNC: 54 U/L (ref 35–104)
ALT SERPL-CCNC: 5 U/L (ref 0–32)
ANION GAP SERPL CALCULATED.3IONS-SCNC: 9 MMOL/L (ref 7–16)
AST SERPL-CCNC: 15 U/L (ref 0–31)
BILIRUB SERPL-MCNC: 0.3 MG/DL (ref 0–1.2)
BUN SERPL-MCNC: 25 MG/DL (ref 6–23)
CALCIUM SERPL-MCNC: 9.9 MG/DL (ref 8.6–10.2)
CHLORIDE SERPL-SCNC: 95 MMOL/L (ref 98–107)
CO2 SERPL-SCNC: 30 MMOL/L (ref 22–29)
CREAT SERPL-MCNC: 4.4 MG/DL (ref 0.5–1)
CRP SERPL HS-MCNC: 0.6 MG/DL (ref 0–0.4)
ERYTHROCYTE [DISTWIDTH] IN BLOOD BY AUTOMATED COUNT: 12.8 FL (ref 11.5–15)
ERYTHROCYTE [SEDIMENTATION RATE] IN BLOOD BY WESTERGREN METHOD: 34 MM/HR (ref 0–20)
GLUCOSE SERPL-MCNC: 167 MG/DL (ref 74–99)
HCT VFR BLD AUTO: 31.9 % (ref 34–48)
HGB BLD-MCNC: 10.1 G/DL (ref 11.5–15.5)
MCH RBC QN AUTO: 34.7 PG (ref 26–35)
MCHC RBC AUTO-ENTMCNC: 31.7 % (ref 32–34.5)
MCV RBC AUTO: 109.6 FL (ref 80–99.9)
PLATELET # BLD AUTO: 216 E9/L (ref 130–450)
PMV BLD AUTO: 11.4 FL (ref 7–12)
POTASSIUM SERPL-SCNC: 5 MMOL/L (ref 3.5–5)
PROT SERPL-MCNC: 6.2 G/DL (ref 6.4–8.3)
RBC # BLD AUTO: 2.91 E12/L (ref 3.5–5.5)
SODIUM SERPL-SCNC: 134 MMOL/L (ref 132–146)
WBC # BLD: 6.6 E9/L (ref 4.5–11.5)

## 2023-03-28 LAB
ALBUMIN SERPL-MCNC: 3.2 G/DL (ref 3.5–5.2)
ALP SERPL-CCNC: 71 U/L (ref 35–104)
ALT SERPL-CCNC: 5 U/L (ref 0–32)
ANION GAP SERPL CALCULATED.3IONS-SCNC: 11 MMOL/L (ref 7–16)
AST SERPL-CCNC: 13 U/L (ref 0–31)
BILIRUB SERPL-MCNC: 0.3 MG/DL (ref 0–1.2)
BUN SERPL-MCNC: 34 MG/DL (ref 6–23)
CALCIUM SERPL-MCNC: 10.4 MG/DL (ref 8.6–10.2)
CHLORIDE SERPL-SCNC: 99 MMOL/L (ref 98–107)
CO2 SERPL-SCNC: 28 MMOL/L (ref 22–29)
CREAT SERPL-MCNC: 5.5 MG/DL (ref 0.5–1)
CRP SERPL HS-MCNC: 0.6 MG/DL (ref 0–0.4)
ERYTHROCYTE [DISTWIDTH] IN BLOOD BY AUTOMATED COUNT: 12.9 FL (ref 11.5–15)
ERYTHROCYTE [SEDIMENTATION RATE] IN BLOOD BY WESTERGREN METHOD: 45 MM/HR (ref 0–20)
GLUCOSE SERPL-MCNC: 154 MG/DL (ref 74–99)
HCT VFR BLD AUTO: 32.2 % (ref 34–48)
HGB BLD-MCNC: 10.4 G/DL (ref 11.5–15.5)
MCH RBC QN AUTO: 35.4 PG (ref 26–35)
MCHC RBC AUTO-ENTMCNC: 32.3 % (ref 32–34.5)
MCV RBC AUTO: 109.5 FL (ref 80–99.9)
PLATELET # BLD AUTO: 244 E9/L (ref 130–450)
PMV BLD AUTO: 11.3 FL (ref 7–12)
POTASSIUM SERPL-SCNC: 4.8 MMOL/L (ref 3.5–5)
PROT SERPL-MCNC: 6.5 G/DL (ref 6.4–8.3)
RBC # BLD AUTO: 2.94 E12/L (ref 3.5–5.5)
SODIUM SERPL-SCNC: 138 MMOL/L (ref 132–146)
WBC # BLD: 7.6 E9/L (ref 4.5–11.5)

## 2023-03-29 LAB
HCT VFR BLD AUTO: 31.8 % (ref 34–48)
HGB BLD-MCNC: 10.4 G/DL (ref 11.5–15.5)

## 2023-04-03 LAB
ALBUMIN SERPL-MCNC: 3.4 G/DL (ref 3.5–5.2)
ALP SERPL-CCNC: 63 U/L (ref 35–104)
ALT SERPL-CCNC: <5 U/L (ref 0–32)
ANION GAP SERPL CALCULATED.3IONS-SCNC: 16 MMOL/L (ref 7–16)
AST SERPL-CCNC: 13 U/L (ref 0–31)
BILIRUB SERPL-MCNC: 0.5 MG/DL (ref 0–1.2)
BUN SERPL-MCNC: 42 MG/DL (ref 6–23)
CALCIUM SERPL-MCNC: 10.3 MG/DL (ref 8.6–10.2)
CHLORIDE SERPL-SCNC: 99 MMOL/L (ref 98–107)
CO2 SERPL-SCNC: 26 MMOL/L (ref 22–29)
CREAT SERPL-MCNC: 7.1 MG/DL (ref 0.5–1)
ERYTHROCYTE [DISTWIDTH] IN BLOOD BY AUTOMATED COUNT: 13.8 FL (ref 11.5–15)
GLUCOSE SERPL-MCNC: 149 MG/DL (ref 74–99)
HCT VFR BLD AUTO: 35.6 % (ref 34–48)
HGB BLD-MCNC: 11 G/DL (ref 11.5–15.5)
MCH RBC QN AUTO: 34.6 PG (ref 26–35)
MCHC RBC AUTO-ENTMCNC: 30.9 % (ref 32–34.5)
MCV RBC AUTO: 111.9 FL (ref 80–99.9)
PLATELET # BLD AUTO: 207 E9/L (ref 130–450)
PMV BLD AUTO: 11.1 FL (ref 7–12)
POTASSIUM SERPL-SCNC: 5.1 MMOL/L (ref 3.5–5)
PROT SERPL-MCNC: 6.6 G/DL (ref 6.4–8.3)
RBC # BLD AUTO: 3.18 E12/L (ref 3.5–5.5)
SODIUM SERPL-SCNC: 141 MMOL/L (ref 132–146)
WBC # BLD: 9.7 E9/L (ref 4.5–11.5)

## 2023-04-07 LAB
ALBUMIN SERPL-MCNC: 3.1 G/DL (ref 3.5–5.2)
ALP SERPL-CCNC: 55 U/L (ref 35–104)
ALT SERPL-CCNC: <5 U/L (ref 0–32)
ANION GAP SERPL CALCULATED.3IONS-SCNC: 9 MMOL/L (ref 7–16)
AST SERPL-CCNC: 14 U/L (ref 0–31)
BILIRUB SERPL-MCNC: 0.3 MG/DL (ref 0–1.2)
BUN SERPL-MCNC: 30 MG/DL (ref 6–23)
CALCIUM SERPL-MCNC: 9.8 MG/DL (ref 8.6–10.2)
CHLORIDE SERPL-SCNC: 96 MMOL/L (ref 98–107)
CO2 SERPL-SCNC: 31 MMOL/L (ref 22–29)
CREAT SERPL-MCNC: 4.7 MG/DL (ref 0.5–1)
CRP SERPL HS-MCNC: 1.6 MG/DL (ref 0–0.4)
ERYTHROCYTE [DISTWIDTH] IN BLOOD BY AUTOMATED COUNT: 13.5 FL (ref 11.5–15)
ERYTHROCYTE [SEDIMENTATION RATE] IN BLOOD BY WESTERGREN METHOD: 37 MM/HR (ref 0–20)
GLUCOSE SERPL-MCNC: 146 MG/DL (ref 74–99)
HCT VFR BLD AUTO: 33.2 % (ref 34–48)
HGB BLD-MCNC: 10.2 G/DL (ref 11.5–15.5)
MCH RBC QN AUTO: 34.5 PG (ref 26–35)
MCHC RBC AUTO-ENTMCNC: 30.7 % (ref 32–34.5)
MCV RBC AUTO: 112.2 FL (ref 80–99.9)
PLATELET # BLD AUTO: 201 E9/L (ref 130–450)
PMV BLD AUTO: 10.9 FL (ref 7–12)
POTASSIUM SERPL-SCNC: 4.2 MMOL/L (ref 3.5–5)
PROT SERPL-MCNC: 6.1 G/DL (ref 6.4–8.3)
RBC # BLD AUTO: 2.96 E12/L (ref 3.5–5.5)
SODIUM SERPL-SCNC: 136 MMOL/L (ref 132–146)
WBC # BLD: 6.3 E9/L (ref 4.5–11.5)

## 2023-04-17 LAB
ALBUMIN SERPL-MCNC: 3.3 G/DL (ref 3.5–5.2)
ALP SERPL-CCNC: 59 U/L (ref 35–104)
ALT SERPL-CCNC: <5 U/L (ref 0–32)
ANION GAP SERPL CALCULATED.3IONS-SCNC: 12 MMOL/L (ref 7–16)
AST SERPL-CCNC: 13 U/L (ref 0–31)
BILIRUB SERPL-MCNC: 0.4 MG/DL (ref 0–1.2)
BUN SERPL-MCNC: 44 MG/DL (ref 6–23)
CALCIUM SERPL-MCNC: 9.9 MG/DL (ref 8.6–10.2)
CHLORIDE SERPL-SCNC: 100 MMOL/L (ref 98–107)
CO2 SERPL-SCNC: 28 MMOL/L (ref 22–29)
CREAT SERPL-MCNC: 6.6 MG/DL (ref 0.5–1)
CRP SERPL HS-MCNC: 2.9 MG/DL (ref 0–0.4)
ERYTHROCYTE [DISTWIDTH] IN BLOOD BY AUTOMATED COUNT: 13 FL (ref 11.5–15)
ERYTHROCYTE [SEDIMENTATION RATE] IN BLOOD BY WESTERGREN METHOD: 70 MM/HR (ref 0–20)
GLUCOSE SERPL-MCNC: 157 MG/DL (ref 74–99)
HCT VFR BLD AUTO: 34.7 % (ref 34–48)
HGB BLD-MCNC: 11 G/DL (ref 11.5–15.5)
MCH RBC QN AUTO: 34.3 PG (ref 26–35)
MCHC RBC AUTO-ENTMCNC: 31.7 % (ref 32–34.5)
MCV RBC AUTO: 108.1 FL (ref 80–99.9)
PLATELET # BLD AUTO: 203 E9/L (ref 130–450)
PMV BLD AUTO: 11.5 FL (ref 7–12)
POTASSIUM SERPL-SCNC: 5 MMOL/L (ref 3.5–5)
PROT SERPL-MCNC: 6.8 G/DL (ref 6.4–8.3)
RBC # BLD AUTO: 3.21 E12/L (ref 3.5–5.5)
SODIUM SERPL-SCNC: 140 MMOL/L (ref 132–146)
WBC # BLD: 10.5 E9/L (ref 4.5–11.5)

## 2023-04-19 ENCOUNTER — OUTSIDE SERVICES (OUTPATIENT)
Dept: PRIMARY CARE CLINIC | Age: 73
End: 2023-04-19

## 2023-04-19 DIAGNOSIS — N18.9 ANEMIA IN CHRONIC KIDNEY DISEASE, UNSPECIFIED CKD STAGE: ICD-10-CM

## 2023-04-19 DIAGNOSIS — N18.6 ESRD ON HEMODIALYSIS (HCC): ICD-10-CM

## 2023-04-19 DIAGNOSIS — E44.0 MODERATE PROTEIN-CALORIE MALNUTRITION (HCC): ICD-10-CM

## 2023-04-19 DIAGNOSIS — R26.9 NEUROLOGIC GAIT DYSFUNCTION: ICD-10-CM

## 2023-04-19 DIAGNOSIS — F41.1 GENERALIZED ANXIETY DISORDER: ICD-10-CM

## 2023-04-19 DIAGNOSIS — Z99.2 ESRD ON HEMODIALYSIS (HCC): ICD-10-CM

## 2023-04-19 DIAGNOSIS — E78.00 PURE HYPERCHOLESTEROLEMIA: ICD-10-CM

## 2023-04-19 DIAGNOSIS — M17.0 PRIMARY OSTEOARTHRITIS OF BOTH KNEES: ICD-10-CM

## 2023-04-19 DIAGNOSIS — R62.7 FAILURE TO THRIVE IN ADULT: ICD-10-CM

## 2023-04-19 DIAGNOSIS — E11.65 TYPE 2 DIABETES MELLITUS WITH HYPERGLYCEMIA, WITH LONG-TERM CURRENT USE OF INSULIN (HCC): ICD-10-CM

## 2023-04-19 DIAGNOSIS — R26.2 DIFFICULTY WALKING: ICD-10-CM

## 2023-04-19 DIAGNOSIS — Z79.4 TYPE 2 DIABETES MELLITUS WITH HYPERGLYCEMIA, WITH LONG-TERM CURRENT USE OF INSULIN (HCC): ICD-10-CM

## 2023-04-19 DIAGNOSIS — I15.1 HYPERTENSION SECONDARY TO OTHER RENAL DISORDERS: ICD-10-CM

## 2023-04-19 DIAGNOSIS — N18.6 ESRD (END STAGE RENAL DISEASE) (HCC): Primary | ICD-10-CM

## 2023-04-19 DIAGNOSIS — M62.81 MUSCLE WEAKNESS (GENERALIZED): ICD-10-CM

## 2023-04-19 DIAGNOSIS — L89.154 PRESSURE INJURY OF SACRAL REGION, STAGE 4 (HCC): ICD-10-CM

## 2023-04-19 DIAGNOSIS — S72.431D CLOSED BICONDYLAR FRACTURE OF RIGHT FEMUR WITH ROUTINE HEALING, SUBSEQUENT ENCOUNTER: ICD-10-CM

## 2023-04-19 DIAGNOSIS — E66.8 MODERATE OBESITY: ICD-10-CM

## 2023-04-19 DIAGNOSIS — S72.421D CLOSED BICONDYLAR FRACTURE OF RIGHT FEMUR WITH ROUTINE HEALING, SUBSEQUENT ENCOUNTER: ICD-10-CM

## 2023-04-19 DIAGNOSIS — N28.89 HYPERTENSION SECONDARY TO OTHER RENAL DISORDERS: ICD-10-CM

## 2023-04-19 DIAGNOSIS — R41.82 ALTERED MENTAL STATUS, UNSPECIFIED ALTERED MENTAL STATUS TYPE: ICD-10-CM

## 2023-04-19 DIAGNOSIS — D63.1 ANEMIA IN CHRONIC KIDNEY DISEASE, UNSPECIFIED CKD STAGE: ICD-10-CM

## 2023-04-20 ENCOUNTER — OUTSIDE SERVICES (OUTPATIENT)
Dept: PRIMARY CARE CLINIC | Age: 73
End: 2023-04-20
Payer: MEDICARE

## 2023-04-20 DIAGNOSIS — R41.82 ALTERED MENTAL STATUS, UNSPECIFIED ALTERED MENTAL STATUS TYPE: Primary | ICD-10-CM

## 2023-04-20 DIAGNOSIS — R62.7 FAILURE TO THRIVE IN ADULT: ICD-10-CM

## 2023-04-20 DIAGNOSIS — E44.0 MODERATE PROTEIN-CALORIE MALNUTRITION (HCC): ICD-10-CM

## 2023-04-20 DIAGNOSIS — A41.9 SEPSIS, DUE TO UNSPECIFIED ORGANISM, UNSPECIFIED WHETHER ACUTE ORGAN DYSFUNCTION PRESENT (HCC): ICD-10-CM

## 2023-04-21 LAB
ALBUMIN SERPL-MCNC: 3.2 G/DL (ref 3.5–5.2)
ALP SERPL-CCNC: 62 U/L (ref 35–104)
ALT SERPL-CCNC: <5 U/L (ref 0–32)
ANION GAP SERPL CALCULATED.3IONS-SCNC: 17 MMOL/L (ref 7–16)
AST SERPL-CCNC: 11 U/L (ref 0–31)
BILIRUB SERPL-MCNC: 0.3 MG/DL (ref 0–1.2)
BUN SERPL-MCNC: 28 MG/DL (ref 6–23)
CALCIUM SERPL-MCNC: 9.7 MG/DL (ref 8.6–10.2)
CHLORIDE SERPL-SCNC: 100 MMOL/L (ref 98–107)
CO2 SERPL-SCNC: 25 MMOL/L (ref 22–29)
CREAT SERPL-MCNC: 4.8 MG/DL (ref 0.5–1)
CRP SERPL HS-MCNC: 3.7 MG/DL (ref 0–0.4)
ERYTHROCYTE [DISTWIDTH] IN BLOOD BY AUTOMATED COUNT: 12.7 FL (ref 11.5–15)
ERYTHROCYTE [SEDIMENTATION RATE] IN BLOOD BY WESTERGREN METHOD: 72 MM/HR (ref 0–20)
GLUCOSE SERPL-MCNC: 124 MG/DL (ref 74–99)
HCT VFR BLD AUTO: 32.6 % (ref 34–48)
HGB BLD-MCNC: 10.6 G/DL (ref 11.5–15.5)
MCH RBC QN AUTO: 34.5 PG (ref 26–35)
MCHC RBC AUTO-ENTMCNC: 32.5 % (ref 32–34.5)
MCV RBC AUTO: 106.2 FL (ref 80–99.9)
PLATELET # BLD AUTO: 202 E9/L (ref 130–450)
PMV BLD AUTO: 11.2 FL (ref 7–12)
POTASSIUM SERPL-SCNC: 4.4 MMOL/L (ref 3.5–5)
PROT SERPL-MCNC: 6.7 G/DL (ref 6.4–8.3)
RBC # BLD AUTO: 3.07 E12/L (ref 3.5–5.5)
SODIUM SERPL-SCNC: 142 MMOL/L (ref 132–146)
WBC # BLD: 9.9 E9/L (ref 4.5–11.5)

## 2023-04-21 ASSESSMENT — ENCOUNTER SYMPTOMS
GASTROINTESTINAL NEGATIVE: 1
EYES NEGATIVE: 1
RESPIRATORY NEGATIVE: 1
ALLERGIC/IMMUNOLOGIC NEGATIVE: 1

## 2023-04-21 NOTE — PROGRESS NOTES
Marisela Mcguire (:  1950) is a 67 y.o. female is awake alert and in no obvious distress. She is sitting up in bed in room and just finished her breakfast.  She states that she is annoyed because she wants to get cleaned up and out of bed as soon as possible. She has no complaints of pain or discomfort when asked and remains on pain medication as needed. She is seen today for her monthly assessment. Her medications and diagnostics are reviewed with no changes. She she remains on dialysis facility every  and Friday. She is to follow-up with both nephrology and Ortho. She is followed by wound NP for sacral wound and right knee wound. Nursing has no concerns and will let Dr. DOWNEY or PA know of any changes. Subjective   SUBJECTIVE/OBJECTIVE:  Monthly assessment  Location:  All nursing and progress notes reviewed. Past Medical History:   Diagnosis Date    Anemia in chronic kidney disease     Anxiety and depression     Arthritis     Chronic pain syndrome     COVID-19 2020    COVID-19     Decreased dorsalis pedis pulse 10/25/2022    Diabetes mellitus ([address]     Dysphagia, oral phase     ESRD on hemodialysis ([address] 10/25/2022    GERD (gastroesophageal reflux disease)     Hemod[facility] patient ([facility]     M-W-F    Hyperkalemia     Hypertension     Hypertensive kidney disease with stage 4 chronic kidney disease (HCC)     Insomnia     Kidney stones     MDD (major depressive disorder)     Moderate protein-calorie malnutrition (HCC)     Morbid obesity (HCC)     Muscle weakness (generalized)     Obesity     Pressure injury of sacral region, stage 3 ([address] 10/25/2022    Pressure ulcer of sacral region     Sacral pressure ulcer 2021    stage 4    Unspecified osteoarthritis, unspecified site     Weakness generalized        No Known Allergies     Review of Systems   Constitutional: Negative. HENT: Negative. Eyes: Negative. Respiratory: Negative. Cardiovascular: Negative.

## 2023-04-24 LAB
ALBUMIN SERPL-MCNC: 3.2 G/DL (ref 3.5–5.2)
ALP SERPL-CCNC: 66 U/L (ref 35–104)
ALT SERPL-CCNC: <5 U/L (ref 0–32)
ANION GAP SERPL CALCULATED.3IONS-SCNC: 14 MMOL/L (ref 7–16)
AST SERPL-CCNC: 13 U/L (ref 0–31)
BILIRUB SERPL-MCNC: 0.3 MG/DL (ref 0–1.2)
BUN SERPL-MCNC: 43 MG/DL (ref 6–23)
CALCIUM SERPL-MCNC: 9.8 MG/DL (ref 8.6–10.2)
CHLORIDE SERPL-SCNC: 99 MMOL/L (ref 98–107)
CO2 SERPL-SCNC: 27 MMOL/L (ref 22–29)
CREAT SERPL-MCNC: 6.3 MG/DL (ref 0.5–1)
CRP SERPL HS-MCNC: 2.1 MG/DL (ref 0–0.4)
ERYTHROCYTE [DISTWIDTH] IN BLOOD BY AUTOMATED COUNT: 12.8 FL (ref 11.5–15)
ERYTHROCYTE [SEDIMENTATION RATE] IN BLOOD BY WESTERGREN METHOD: 52 MM/HR (ref 0–20)
GLUCOSE SERPL-MCNC: 162 MG/DL (ref 74–99)
HCT VFR BLD AUTO: 33.4 % (ref 34–48)
HGB BLD-MCNC: 10.8 G/DL (ref 11.5–15.5)
MCH RBC QN AUTO: 34.5 PG (ref 26–35)
MCHC RBC AUTO-ENTMCNC: 32.3 % (ref 32–34.5)
MCV RBC AUTO: 106.7 FL (ref 80–99.9)
PLATELET # BLD AUTO: 225 E9/L (ref 130–450)
PMV BLD AUTO: 11.2 FL (ref 7–12)
POTASSIUM SERPL-SCNC: 4.4 MMOL/L (ref 3.5–5)
PROT SERPL-MCNC: 6.9 G/DL (ref 6.4–8.3)
RBC # BLD AUTO: 3.13 E12/L (ref 3.5–5.5)
SODIUM SERPL-SCNC: 140 MMOL/L (ref 132–146)
WBC # BLD: 8.1 E9/L (ref 4.5–11.5)

## 2023-04-27 ENCOUNTER — OUTSIDE SERVICES (OUTPATIENT)
Dept: PRIMARY CARE CLINIC | Age: 73
End: 2023-04-27

## 2023-04-27 DIAGNOSIS — J90 PLEURAL EFFUSION: Primary | ICD-10-CM

## 2023-04-28 LAB
ALBUMIN SERPL-MCNC: 3.2 G/DL (ref 3.5–5.2)
ALP SERPL-CCNC: 67 U/L (ref 35–104)
ALT SERPL-CCNC: <5 U/L (ref 0–32)
ANION GAP SERPL CALCULATED.3IONS-SCNC: 15 MMOL/L (ref 7–16)
AST SERPL-CCNC: 11 U/L (ref 0–31)
BILIRUB SERPL-MCNC: 0.3 MG/DL (ref 0–1.2)
BUN SERPL-MCNC: 59 MG/DL (ref 6–23)
CALCIUM SERPL-MCNC: 9.7 MG/DL (ref 8.6–10.2)
CHLORIDE SERPL-SCNC: 99 MMOL/L (ref 98–107)
CO2 SERPL-SCNC: 26 MMOL/L (ref 22–29)
CREAT SERPL-MCNC: 6.9 MG/DL (ref 0.5–1)
CRP SERPL HS-MCNC: 3 MG/DL (ref 0–0.4)
ERYTHROCYTE [DISTWIDTH] IN BLOOD BY AUTOMATED COUNT: 12.5 FL (ref 11.5–15)
ERYTHROCYTE [SEDIMENTATION RATE] IN BLOOD BY WESTERGREN METHOD: 73 MM/HR (ref 0–20)
GLUCOSE SERPL-MCNC: 107 MG/DL (ref 74–99)
HCT VFR BLD AUTO: 30.7 % (ref 34–48)
HGB BLD-MCNC: 10 G/DL (ref 11.5–15.5)
MCH RBC QN AUTO: 33.9 PG (ref 26–35)
MCHC RBC AUTO-ENTMCNC: 32.6 % (ref 32–34.5)
MCV RBC AUTO: 104.1 FL (ref 80–99.9)
PLATELET # BLD AUTO: 201 E9/L (ref 130–450)
PMV BLD AUTO: 11.4 FL (ref 7–12)
POTASSIUM SERPL-SCNC: 4.5 MMOL/L (ref 3.5–5)
PROT SERPL-MCNC: 6.6 G/DL (ref 6.4–8.3)
RBC # BLD AUTO: 2.95 E12/L (ref 3.5–5.5)
SODIUM SERPL-SCNC: 140 MMOL/L (ref 132–146)
WBC # BLD: 10.3 E9/L (ref 4.5–11.5)

## 2023-05-01 LAB
ALBUMIN SERPL-MCNC: 3.2 G/DL (ref 3.5–5.2)
ALP SERPL-CCNC: 73 U/L (ref 35–104)
ALT SERPL-CCNC: <5 U/L (ref 0–32)
ANION GAP SERPL CALCULATED.3IONS-SCNC: 16 MMOL/L (ref 7–16)
AST SERPL-CCNC: 14 U/L (ref 0–31)
BILIRUB SERPL-MCNC: 0.4 MG/DL (ref 0–1.2)
BUN SERPL-MCNC: 64 MG/DL (ref 6–23)
CALCIUM SERPL-MCNC: 9.9 MG/DL (ref 8.6–10.2)
CHLORIDE SERPL-SCNC: 98 MMOL/L (ref 98–107)
CO2 SERPL-SCNC: 25 MMOL/L (ref 22–29)
CREAT SERPL-MCNC: 7.3 MG/DL (ref 0.5–1)
CRP SERPL HS-MCNC: 3.8 MG/DL (ref 0–0.4)
ERYTHROCYTE [DISTWIDTH] IN BLOOD BY AUTOMATED COUNT: 12.5 FL (ref 11.5–15)
ERYTHROCYTE [SEDIMENTATION RATE] IN BLOOD BY WESTERGREN METHOD: 87 MM/HR (ref 0–20)
GLUCOSE SERPL-MCNC: 145 MG/DL (ref 74–99)
HCT VFR BLD AUTO: 32.6 % (ref 34–48)
HGB BLD-MCNC: 10.3 G/DL (ref 11.5–15.5)
MCH RBC QN AUTO: 33.6 PG (ref 26–35)
MCHC RBC AUTO-ENTMCNC: 31.6 % (ref 32–34.5)
MCV RBC AUTO: 106.2 FL (ref 80–99.9)
PLATELET # BLD AUTO: 208 E9/L (ref 130–450)
PMV BLD AUTO: 11.2 FL (ref 7–12)
POTASSIUM SERPL-SCNC: 4.9 MMOL/L (ref 3.5–5)
PROT SERPL-MCNC: 6.6 G/DL (ref 6.4–8.3)
RBC # BLD AUTO: 3.07 E12/L (ref 3.5–5.5)
SODIUM SERPL-SCNC: 139 MMOL/L (ref 132–146)
WBC # BLD: 10.7 E9/L (ref 4.5–11.5)

## 2023-05-05 LAB
ALBUMIN SERPL-MCNC: 3.1 G/DL (ref 3.5–5.2)
ALP SERPL-CCNC: 71 U/L (ref 35–104)
ALT SERPL-CCNC: <5 U/L (ref 0–32)
ANION GAP SERPL CALCULATED.3IONS-SCNC: 14 MMOL/L (ref 7–16)
AST SERPL-CCNC: 12 U/L (ref 0–31)
BILIRUB SERPL-MCNC: 0.3 MG/DL (ref 0–1.2)
BUN SERPL-MCNC: 29 MG/DL (ref 6–23)
CALCIUM SERPL-MCNC: 9.4 MG/DL (ref 8.6–10.2)
CHLORIDE SERPL-SCNC: 96 MMOL/L (ref 98–107)
CO2 SERPL-SCNC: 27 MMOL/L (ref 22–29)
CREAT SERPL-MCNC: 4.8 MG/DL (ref 0.5–1)
CRP SERPL HS-MCNC: 2.3 MG/DL (ref 0–0.4)
ERYTHROCYTE [DISTWIDTH] IN BLOOD BY AUTOMATED COUNT: 12.2 FL (ref 11.5–15)
ERYTHROCYTE [SEDIMENTATION RATE] IN BLOOD BY WESTERGREN METHOD: 80 MM/HR (ref 0–20)
GLUCOSE SERPL-MCNC: 133 MG/DL (ref 74–99)
HCT VFR BLD AUTO: 30.8 % (ref 34–48)
HGB BLD-MCNC: 10.1 G/DL (ref 11.5–15.5)
MCH RBC QN AUTO: 34.1 PG (ref 26–35)
MCHC RBC AUTO-ENTMCNC: 32.8 % (ref 32–34.5)
MCV RBC AUTO: 104.1 FL (ref 80–99.9)
PLATELET # BLD AUTO: 220 E9/L (ref 130–450)
PMV BLD AUTO: 11.2 FL (ref 7–12)
POTASSIUM SERPL-SCNC: 3.9 MMOL/L (ref 3.5–5)
PROT SERPL-MCNC: 6.4 G/DL (ref 6.4–8.3)
RBC # BLD AUTO: 2.96 E12/L (ref 3.5–5.5)
SODIUM SERPL-SCNC: 137 MMOL/L (ref 132–146)
WBC # BLD: 12.1 E9/L (ref 4.5–11.5)

## 2023-05-08 LAB
ALBUMIN SERPL-MCNC: 3.1 G/DL (ref 3.5–5.2)
ALP SERPL-CCNC: 81 U/L (ref 35–104)
ALT SERPL-CCNC: 6 U/L (ref 0–32)
ANION GAP SERPL CALCULATED.3IONS-SCNC: 16 MMOL/L (ref 7–16)
AST SERPL-CCNC: 9 U/L (ref 0–31)
BILIRUB SERPL-MCNC: 0.4 MG/DL (ref 0–1.2)
BUN SERPL-MCNC: 49 MG/DL (ref 6–23)
CALCIUM SERPL-MCNC: 9.6 MG/DL (ref 8.6–10.2)
CHLORIDE SERPL-SCNC: 98 MMOL/L (ref 98–107)
CO2 SERPL-SCNC: 27 MMOL/L (ref 22–29)
CREAT SERPL-MCNC: 7.5 MG/DL (ref 0.5–1)
CRP SERPL HS-MCNC: 5.9 MG/DL (ref 0–0.4)
ERYTHROCYTE [DISTWIDTH] IN BLOOD BY AUTOMATED COUNT: 12.5 FL (ref 11.5–15)
ERYTHROCYTE [SEDIMENTATION RATE] IN BLOOD BY WESTERGREN METHOD: 120 MM/HR (ref 0–20)
GLUCOSE SERPL-MCNC: 94 MG/DL (ref 74–99)
HCT VFR BLD AUTO: 30.2 % (ref 34–48)
HGB BLD-MCNC: 9.6 G/DL (ref 11.5–15.5)
MCH RBC QN AUTO: 33.3 PG (ref 26–35)
MCHC RBC AUTO-ENTMCNC: 31.8 % (ref 32–34.5)
MCV RBC AUTO: 104.9 FL (ref 80–99.9)
PLATELET # BLD AUTO: 229 E9/L (ref 130–450)
PMV BLD AUTO: 11 FL (ref 7–12)
POTASSIUM SERPL-SCNC: 4.3 MMOL/L (ref 3.5–5)
PROT SERPL-MCNC: 6.8 G/DL (ref 6.4–8.3)
RBC # BLD AUTO: 2.88 E12/L (ref 3.5–5.5)
SODIUM SERPL-SCNC: 141 MMOL/L (ref 132–146)
WBC # BLD: 10.7 E9/L (ref 4.5–11.5)

## 2023-05-12 LAB
ALBUMIN SERPL-MCNC: 3.1 G/DL (ref 3.5–5.2)
ALP SERPL-CCNC: 79 U/L (ref 35–104)
ALT SERPL-CCNC: 6 U/L (ref 0–32)
ANION GAP SERPL CALCULATED.3IONS-SCNC: 15 MMOL/L (ref 7–16)
AST SERPL-CCNC: 11 U/L (ref 0–31)
BILIRUB SERPL-MCNC: 0.2 MG/DL (ref 0–1.2)
BUN SERPL-MCNC: 24 MG/DL (ref 6–23)
CALCIUM SERPL-MCNC: 9.6 MG/DL (ref 8.6–10.2)
CHLORIDE SERPL-SCNC: 97 MMOL/L (ref 98–107)
CO2 SERPL-SCNC: 27 MMOL/L (ref 22–29)
CREAT SERPL-MCNC: 4.2 MG/DL (ref 0.5–1)
CRP SERPL HS-MCNC: 4.6 MG/DL (ref 0–0.4)
ERYTHROCYTE [DISTWIDTH] IN BLOOD BY AUTOMATED COUNT: 12.4 FL (ref 11.5–15)
ERYTHROCYTE [SEDIMENTATION RATE] IN BLOOD BY WESTERGREN METHOD: 98 MM/HR (ref 0–20)
GLUCOSE SERPL-MCNC: 160 MG/DL (ref 74–99)
HCT VFR BLD AUTO: 28.5 % (ref 34–48)
HGB BLD-MCNC: 9.1 G/DL (ref 11.5–15.5)
MCH RBC QN AUTO: 32.9 PG (ref 26–35)
MCHC RBC AUTO-ENTMCNC: 31.9 % (ref 32–34.5)
MCV RBC AUTO: 102.9 FL (ref 80–99.9)
PLATELET # BLD AUTO: 234 E9/L (ref 130–450)
PMV BLD AUTO: 10.5 FL (ref 7–12)
POTASSIUM SERPL-SCNC: 4 MMOL/L (ref 3.5–5)
PROT SERPL-MCNC: 6.8 G/DL (ref 6.4–8.3)
RBC # BLD AUTO: 2.77 E12/L (ref 3.5–5.5)
SODIUM SERPL-SCNC: 139 MMOL/L (ref 132–146)
WBC # BLD: 7 E9/L (ref 4.5–11.5)

## 2023-05-16 LAB
ALBUMIN SERPL-MCNC: 2.7 G/DL (ref 3.5–5.2)
ALP SERPL-CCNC: 88 U/L (ref 35–104)
ALT SERPL-CCNC: 6 U/L (ref 0–32)
ANION GAP SERPL CALCULATED.3IONS-SCNC: 11 MMOL/L (ref 7–16)
AST SERPL-CCNC: 12 U/L (ref 0–31)
BASOPHILS # BLD: 0.04 E9/L (ref 0–0.2)
BASOPHILS NFR BLD: 0.5 % (ref 0–2)
BILIRUB SERPL-MCNC: <0.2 MG/DL (ref 0–1.2)
BUN SERPL-MCNC: 38 MG/DL (ref 6–23)
CALCIUM SERPL-MCNC: 9.3 MG/DL (ref 8.6–10.2)
CHLORIDE SERPL-SCNC: 101 MMOL/L (ref 98–107)
CO2 SERPL-SCNC: 28 MMOL/L (ref 22–29)
CREAT SERPL-MCNC: 5.4 MG/DL (ref 0.5–1)
CRP SERPL HS-MCNC: 6.3 MG/DL (ref 0–0.4)
EOSINOPHIL # BLD: 0.33 E9/L (ref 0.05–0.5)
EOSINOPHIL NFR BLD: 3.9 % (ref 0–6)
ERYTHROCYTE [DISTWIDTH] IN BLOOD BY AUTOMATED COUNT: 12.8 FL (ref 11.5–15)
ERYTHROCYTE [SEDIMENTATION RATE] IN BLOOD BY WESTERGREN METHOD: 104 MM/HR (ref 0–20)
GLUCOSE SERPL-MCNC: 99 MG/DL (ref 74–99)
HCT VFR BLD AUTO: 27 % (ref 34–48)
HGB BLD-MCNC: 8.4 G/DL (ref 11.5–15.5)
IMM GRANULOCYTES # BLD: 0.06 E9/L
IMM GRANULOCYTES NFR BLD: 0.7 % (ref 0–5)
LYMPHOCYTES # BLD: 1.38 E9/L (ref 1.5–4)
LYMPHOCYTES NFR BLD: 16.2 % (ref 20–42)
MCH RBC QN AUTO: 32.9 PG (ref 26–35)
MCHC RBC AUTO-ENTMCNC: 31.1 % (ref 32–34.5)
MCV RBC AUTO: 105.9 FL (ref 80–99.9)
MONOCYTES # BLD: 0.94 E9/L (ref 0.1–0.95)
MONOCYTES NFR BLD: 11 % (ref 2–12)
NEUTROPHILS # BLD: 5.77 E9/L (ref 1.8–7.3)
NEUTS SEG NFR BLD: 67.7 % (ref 43–80)
PLATELET # BLD AUTO: 204 E9/L (ref 130–450)
PMV BLD AUTO: 10.1 FL (ref 7–12)
POTASSIUM SERPL-SCNC: 4.3 MMOL/L (ref 3.5–5)
PROT SERPL-MCNC: 6.2 G/DL (ref 6.4–8.3)
RBC # BLD AUTO: 2.55 E12/L (ref 3.5–5.5)
SODIUM SERPL-SCNC: 140 MMOL/L (ref 132–146)
WBC # BLD: 8.5 E9/L (ref 4.5–11.5)

## 2023-05-23 ENCOUNTER — TELEPHONE (OUTPATIENT)
Dept: ORTHOPEDIC SURGERY | Age: 73
End: 2023-05-23

## 2023-05-23 NOTE — TELEPHONE ENCOUNTER
Received X-ray report of right foot by fax from [facility]:  Impression:  Fracture right 5th metatarsal neck. Spoke with Megan COLLADO at [facility]. Referred to this office as Rachell Alves was patient of Dr Luis Salmon. Patient c/o pain right foot while bearing weight in PT yesterday, X-ray ordered. Does not c/o pain with palpation or flexion of foot. Patient does not recall injury. Patient only bears weight when in PT. Asking if Dr Ad Lopez would see patient. Xray report attached, no film to review. Per Megan patient has hx of being noncompliant. ? OV and how soon. They will keep her NWB RLE until we can get back to them.

## 2023-05-24 NOTE — TELEPHONE ENCOUNTER
Tried to reach nurse on [address] at [facility]. No one was available to answer phone, unable to leave message.   Will try again tomorrow to contact them to schedule appt for next week

## 2023-05-25 ENCOUNTER — OUTSIDE SERVICES (OUTPATIENT)
Dept: PRIMARY CARE CLINIC | Age: 73
End: 2023-05-25

## 2023-05-25 DIAGNOSIS — F41.1 GENERALIZED ANXIETY DISORDER: ICD-10-CM

## 2023-05-25 DIAGNOSIS — E66.8 MODERATE OBESITY: ICD-10-CM

## 2023-05-25 DIAGNOSIS — Z79.4 TYPE 2 DIABETES MELLITUS WITH HYPERGLYCEMIA, WITH LONG-TERM CURRENT USE OF INSULIN (HCC): ICD-10-CM

## 2023-05-25 DIAGNOSIS — N18.9 ANEMIA IN CHRONIC KIDNEY DISEASE, UNSPECIFIED CKD STAGE: ICD-10-CM

## 2023-05-25 DIAGNOSIS — E78.00 PURE HYPERCHOLESTEROLEMIA: ICD-10-CM

## 2023-05-25 DIAGNOSIS — N18.6 ESRD ON HEMODIALYSIS (HCC): Primary | ICD-10-CM

## 2023-05-25 DIAGNOSIS — N28.89 HYPERTENSION SECONDARY TO OTHER RENAL DISORDERS: ICD-10-CM

## 2023-05-25 DIAGNOSIS — R26.2 DIFFICULTY WALKING: ICD-10-CM

## 2023-05-25 DIAGNOSIS — R62.7 FAILURE TO THRIVE IN ADULT: ICD-10-CM

## 2023-05-25 DIAGNOSIS — E11.65 TYPE 2 DIABETES MELLITUS WITH HYPERGLYCEMIA, WITH LONG-TERM CURRENT USE OF INSULIN (HCC): ICD-10-CM

## 2023-05-25 DIAGNOSIS — I15.1 HYPERTENSION SECONDARY TO OTHER RENAL DISORDERS: ICD-10-CM

## 2023-05-25 DIAGNOSIS — D63.1 ANEMIA IN CHRONIC KIDNEY DISEASE, UNSPECIFIED CKD STAGE: ICD-10-CM

## 2023-05-25 DIAGNOSIS — Z99.2 ESRD ON HEMODIALYSIS (HCC): Primary | ICD-10-CM

## 2023-05-25 DIAGNOSIS — M62.81 MUSCLE WEAKNESS (GENERALIZED): ICD-10-CM

## 2023-05-31 ENCOUNTER — OFFICE VISIT (OUTPATIENT)
Dept: ORTHOPEDIC SURGERY | Age: 73
End: 2023-05-31

## 2023-05-31 VITALS — BODY MASS INDEX: 35.12 KG/M2 | HEIGHT: 61 IN | WEIGHT: 186 LBS

## 2023-05-31 DIAGNOSIS — S92.901A CLOSED FRACTURE OF RIGHT FOOT, INITIAL ENCOUNTER: Primary | ICD-10-CM

## 2023-05-31 NOTE — PROGRESS NOTES
New Ankle Patient     Referring Provider: No referring provider defined for this encounter. CHIEF COMPLAINT:   Chief Complaint   Patient presents with    New Patient     Right 5th metatarsal fx        HPI:    João Armenta is a 67y.o. year old female with right foot pain. She was walking with therapy at her nursing home with a walker and heard a pop. X-rays were taken demonstrating fifth metatarsal fracture. She did have surgery on her right distal femur by my partner in November. This was complicated by wound healing problem. This is apparently healed up and she is starting to walk with a walker. She has some lateral sided foot pain which is minimal.  She has been nonweightbearing since her injury. Denies fevers and chills. Denies numbness tingling. PAST MEDICAL HISTORY  Past Medical History:   Diagnosis Date    Anemia in chronic kidney disease     Anxiety and depression     Arthritis     Chronic pain syndrome     COVID-19 05/2020    COVID-19     Decreased dorsalis pedis pulse 10/25/2022    Diabetes mellitus ([address]     Dysphagia, oral phase     ESRD on hemodialysis ([address] 10/25/2022    GERD (gastroesophageal reflux disease)     Hemodialysis patient ([address]     M-W-F    Hyperkalemia     Hypertension     Hypertensive kidney disease with stage 4 chronic kidney disease (HCC)     Insomnia     Kidney stones     MDD (major depressive disorder)     Moderate protein-calorie malnutrition (HCC)     Morbid obesity (HCC)     Muscle weakness (generalized)     Obesity     Pressure injury of sacral region, stage 3 ([address] 10/25/2022    Pressure ulcer of sacral region     Sacral pressure ulcer 08/03/2021    stage 4    Unspecified osteoarthritis, unspecified site     Weakness generalized        PAST SURGICAL HISTORY  Past Surgical History:   Procedure Laterality Date    ABDOMEN SURGERY N/A 5/4/2020    SACRAL WOUND DEBRIDEMENT CALL   WITH TIME AVAIL AM performed by Luis Dillard MD at [email or web address]

## 2023-06-01 ASSESSMENT — ENCOUNTER SYMPTOMS
VOMITING: 0
CONSTIPATION: 0
SORE THROAT: 0
WHEEZING: 0
RHINORRHEA: 0
DIARRHEA: 0
PHOTOPHOBIA: 0
SHORTNESS OF BREATH: 0
BACK PAIN: 0
COUGH: 0
ABDOMINAL PAIN: 0

## 2023-06-01 NOTE — PROGRESS NOTES
monthly CBC and CMP A1c every 3 months. Labs according to dialysis schedule. Continue to follow with orthopedics and nephrology  Acute medical concerns to provider on-call  Continue to follow monthly visits    An electronic signature was used to authenticate this note.     --Brett Soares PA-C

## 2023-06-03 LAB
CRP SERPL HS-MCNC: 8 MG/DL (ref 0–0.4)
ERYTHROCYTE [DISTWIDTH] IN BLOOD BY AUTOMATED COUNT: 13 FL (ref 11.5–15)
ERYTHROCYTE [SEDIMENTATION RATE] IN BLOOD BY WESTERGREN METHOD: 130 MM/HR (ref 0–20)
HCT VFR BLD AUTO: 26.1 % (ref 34–48)
HGB BLD-MCNC: 8 G/DL (ref 11.5–15.5)
MCH RBC QN AUTO: 32.7 PG (ref 26–35)
MCHC RBC AUTO-ENTMCNC: 30.7 % (ref 32–34.5)
MCV RBC AUTO: 106.5 FL (ref 80–99.9)
PLATELET # BLD AUTO: 240 E9/L (ref 130–450)
PMV BLD AUTO: 10 FL (ref 7–12)
RBC # BLD AUTO: 2.45 E12/L (ref 3.5–5.5)
WBC # BLD: 6.5 E9/L (ref 4.5–11.5)

## 2023-06-04 LAB — BACTERIA WND AEROBE CULT: NORMAL

## 2023-06-06 LAB
BACTERIA SPEC ANAEROBE CULT: ABNORMAL
BACTERIA SPEC ANAEROBE CULT: ABNORMAL
ORGANISM: ABNORMAL

## 2023-06-07 LAB
BACTERIA WND AEROBE CULT: ABNORMAL
BACTERIA WND AEROBE CULT: ABNORMAL
ORGANISM: ABNORMAL

## 2023-06-27 ENCOUNTER — OUTSIDE SERVICES (OUTPATIENT)
Dept: PRIMARY CARE CLINIC | Age: 73
End: 2023-06-27
Payer: MEDICARE

## 2023-06-27 DIAGNOSIS — I15.1 HYPERTENSION SECONDARY TO OTHER RENAL DISORDERS: ICD-10-CM

## 2023-06-27 DIAGNOSIS — N18.9 ANEMIA IN CHRONIC KIDNEY DISEASE, UNSPECIFIED CKD STAGE: ICD-10-CM

## 2023-06-27 DIAGNOSIS — F41.1 GENERALIZED ANXIETY DISORDER: ICD-10-CM

## 2023-06-27 DIAGNOSIS — Z79.4 TYPE 2 DIABETES MELLITUS WITH HYPERGLYCEMIA, WITH LONG-TERM CURRENT USE OF INSULIN (HCC): ICD-10-CM

## 2023-06-27 DIAGNOSIS — M17.0 PRIMARY OSTEOARTHRITIS OF BOTH KNEES: ICD-10-CM

## 2023-06-27 DIAGNOSIS — L89.154 PRESSURE INJURY OF SACRAL REGION, STAGE 4 (HCC): ICD-10-CM

## 2023-06-27 DIAGNOSIS — R62.7 FAILURE TO THRIVE IN ADULT: ICD-10-CM

## 2023-06-27 DIAGNOSIS — E78.00 PURE HYPERCHOLESTEROLEMIA: ICD-10-CM

## 2023-06-27 DIAGNOSIS — Z99.2 ESRD ON HEMODIALYSIS (HCC): Primary | ICD-10-CM

## 2023-06-27 DIAGNOSIS — D63.1 ANEMIA IN CHRONIC KIDNEY DISEASE, UNSPECIFIED CKD STAGE: ICD-10-CM

## 2023-06-27 DIAGNOSIS — N28.89 HYPERTENSION SECONDARY TO OTHER RENAL DISORDERS: ICD-10-CM

## 2023-06-27 DIAGNOSIS — M62.81 MUSCLE WEAKNESS (GENERALIZED): ICD-10-CM

## 2023-06-27 DIAGNOSIS — N18.6 ESRD ON HEMODIALYSIS (HCC): Primary | ICD-10-CM

## 2023-06-27 DIAGNOSIS — R26.2 DIFFICULTY WALKING: ICD-10-CM

## 2023-06-27 DIAGNOSIS — E66.8 MODERATE OBESITY: ICD-10-CM

## 2023-06-27 DIAGNOSIS — E11.65 TYPE 2 DIABETES MELLITUS WITH HYPERGLYCEMIA, WITH LONG-TERM CURRENT USE OF INSULIN (HCC): ICD-10-CM

## 2023-06-27 PROCEDURE — 99309 SBSQ NF CARE MODERATE MDM 30: CPT

## 2023-06-28 ENCOUNTER — OFFICE VISIT (OUTPATIENT)
Dept: ORTHOPEDIC SURGERY | Age: 73
End: 2023-06-28

## 2023-06-28 VITALS — HEIGHT: 60 IN | BODY MASS INDEX: 36.52 KG/M2 | WEIGHT: 186 LBS

## 2023-06-28 DIAGNOSIS — S72.8X1A OTHER FRACTURE OF RIGHT FEMUR, INITIAL ENCOUNTER FOR CLOSED FRACTURE (HCC): ICD-10-CM

## 2023-06-28 DIAGNOSIS — S92.901A CLOSED FRACTURE OF RIGHT FOOT, INITIAL ENCOUNTER: Primary | ICD-10-CM

## 2023-06-29 ASSESSMENT — ENCOUNTER SYMPTOMS
RHINORRHEA: 0
SORE THROAT: 0
SHORTNESS OF BREATH: 0
DIARRHEA: 0
COUGH: 0
VOMITING: 0
PHOTOPHOBIA: 0
CONSTIPATION: 0
WHEEZING: 0
ABDOMINAL PAIN: 0
BACK PAIN: 0

## 2023-07-11 LAB
ALBUMIN SERPL-MCNC: 2.9 G/DL (ref 3.5–5.2)
ALP SERPL-CCNC: 88 U/L (ref 35–104)
ALT SERPL-CCNC: <5 U/L (ref 0–32)
ANION GAP SERPL CALCULATED.3IONS-SCNC: 13 MMOL/L (ref 7–16)
AST SERPL-CCNC: 9 U/L (ref 0–31)
BILIRUB SERPL-MCNC: 0.3 MG/DL (ref 0–1.2)
BUN SERPL-MCNC: 30 MG/DL (ref 6–23)
CALCIUM SERPL-MCNC: 9.1 MG/DL (ref 8.6–10.2)
CHLORIDE SERPL-SCNC: 90 MMOL/L (ref 98–107)
CO2 SERPL-SCNC: 31 MMOL/L (ref 22–29)
CREAT SERPL-MCNC: 4.6 MG/DL (ref 0.5–1)
ERYTHROCYTE [DISTWIDTH] IN BLOOD BY AUTOMATED COUNT: 13.9 FL (ref 11.5–15)
GLUCOSE SERPL-MCNC: 87 MG/DL (ref 74–99)
HCT VFR BLD AUTO: 23.7 % (ref 34–48)
HGB BLD-MCNC: 7.3 G/DL (ref 11.5–15.5)
MCH RBC QN AUTO: 34 PG (ref 26–35)
MCHC RBC AUTO-ENTMCNC: 30.8 % (ref 32–34.5)
MCV RBC AUTO: 110.2 FL (ref 80–99.9)
PLATELET # BLD AUTO: 216 E9/L (ref 130–450)
PMV BLD AUTO: 9.9 FL (ref 7–12)
POTASSIUM SERPL-SCNC: 4.3 MMOL/L (ref 3.5–5)
PROT SERPL-MCNC: 6.5 G/DL (ref 6.4–8.3)
RBC # BLD AUTO: 2.15 E12/L (ref 3.5–5.5)
SODIUM SERPL-SCNC: 134 MMOL/L (ref 132–146)
WBC # BLD: 12.6 E9/L (ref 4.5–11.5)

## 2023-07-18 LAB
ALBUMIN SERPL-MCNC: 2.9 G/DL (ref 3.5–5.2)
ALP SERPL-CCNC: 77 U/L (ref 35–104)
ALT SERPL-CCNC: <5 U/L (ref 0–32)
ANION GAP SERPL CALCULATED.3IONS-SCNC: 12 MMOL/L (ref 7–16)
AST SERPL-CCNC: 10 U/L (ref 0–31)
BASOPHILS # BLD: 0.04 K/UL (ref 0–0.2)
BASOPHILS NFR BLD: 1 % (ref 0–2)
BILIRUB SERPL-MCNC: 0.2 MG/DL (ref 0–1.2)
BUN SERPL-MCNC: 40 MG/DL (ref 6–23)
CALCIUM SERPL-MCNC: 9.7 MG/DL (ref 8.6–10.2)
CHLORIDE SERPL-SCNC: 97 MMOL/L (ref 98–107)
CO2 SERPL-SCNC: 29 MMOL/L (ref 22–29)
CREAT SERPL-MCNC: 5.6 MG/DL (ref 0.5–1)
EOSINOPHIL # BLD: 0.4 K/UL (ref 0.05–0.5)
EOSINOPHILS RELATIVE PERCENT: 5 % (ref 0–6)
ERYTHROCYTE [DISTWIDTH] IN BLOOD BY AUTOMATED COUNT: 13.6 % (ref 11.5–15)
GFR SERPL CREATININE-BSD FRML MDRD: 7 ML/MIN/1.73M2
GLUCOSE SERPL-MCNC: 129 MG/DL (ref 74–99)
HCT VFR BLD AUTO: 23.6 % (ref 34–48)
HGB BLD-MCNC: 7.2 G/DL (ref 11.5–15.5)
IMM GRANULOCYTES # BLD AUTO: 0.08 K/UL (ref 0–0.58)
IMM GRANULOCYTES NFR BLD: 1 % (ref 0–5)
LYMPHOCYTES NFR BLD: 0.86 K/UL (ref 1.5–4)
LYMPHOCYTES RELATIVE PERCENT: 12 % (ref 20–42)
MCH RBC QN AUTO: 33.3 PG (ref 26–35)
MCHC RBC AUTO-ENTMCNC: 30.5 G/DL (ref 32–34.5)
MCV RBC AUTO: 109.3 FL (ref 80–99.9)
MONOCYTES NFR BLD: 0.86 K/UL (ref 0.1–0.95)
MONOCYTES NFR BLD: 12 % (ref 2–12)
NEUTROPHILS NFR BLD: 70 % (ref 43–80)
NEUTS SEG NFR BLD: 5.26 K/UL (ref 1.8–7.3)
PLATELET # BLD AUTO: 338 K/UL (ref 130–450)
PMV BLD AUTO: 10 FL (ref 7–12)
POTASSIUM SERPL-SCNC: 4.8 MMOL/L (ref 3.5–5)
PROT SERPL-MCNC: 6.6 G/DL (ref 6.4–8.3)
RBC # BLD AUTO: 2.16 M/UL (ref 3.5–5.5)
SODIUM SERPL-SCNC: 138 MMOL/L (ref 132–146)
WBC OTHER # BLD: 7.5 K/UL (ref 4.5–11.5)

## 2023-07-25 ENCOUNTER — OUTSIDE SERVICES (OUTPATIENT)
Dept: PRIMARY CARE CLINIC | Age: 73
End: 2023-07-25

## 2023-07-25 DIAGNOSIS — Z99.2 ESRD ON HEMODIALYSIS (HCC): Primary | ICD-10-CM

## 2023-07-25 DIAGNOSIS — L89.154 PRESSURE INJURY OF SACRAL REGION, STAGE 4 (HCC): ICD-10-CM

## 2023-07-25 DIAGNOSIS — D63.1 ANEMIA IN CHRONIC KIDNEY DISEASE, UNSPECIFIED CKD STAGE: ICD-10-CM

## 2023-07-25 DIAGNOSIS — E11.65 TYPE 2 DIABETES MELLITUS WITH HYPERGLYCEMIA, WITH LONG-TERM CURRENT USE OF INSULIN (HCC): ICD-10-CM

## 2023-07-25 DIAGNOSIS — M17.0 PRIMARY OSTEOARTHRITIS OF BOTH KNEES: ICD-10-CM

## 2023-07-25 DIAGNOSIS — E78.00 PURE HYPERCHOLESTEROLEMIA: ICD-10-CM

## 2023-07-25 DIAGNOSIS — I15.1 HYPERTENSION SECONDARY TO OTHER RENAL DISORDERS: ICD-10-CM

## 2023-07-25 DIAGNOSIS — M62.81 MUSCLE WEAKNESS (GENERALIZED): ICD-10-CM

## 2023-07-25 DIAGNOSIS — Z79.4 TYPE 2 DIABETES MELLITUS WITH HYPERGLYCEMIA, WITH LONG-TERM CURRENT USE OF INSULIN (HCC): ICD-10-CM

## 2023-07-25 DIAGNOSIS — N18.6 ESRD ON HEMODIALYSIS (HCC): Primary | ICD-10-CM

## 2023-07-25 DIAGNOSIS — N18.9 ANEMIA IN CHRONIC KIDNEY DISEASE, UNSPECIFIED CKD STAGE: ICD-10-CM

## 2023-07-25 DIAGNOSIS — N28.89 HYPERTENSION SECONDARY TO OTHER RENAL DISORDERS: ICD-10-CM

## 2023-07-25 DIAGNOSIS — E66.8 MODERATE OBESITY: ICD-10-CM

## 2023-07-25 DIAGNOSIS — R62.7 FAILURE TO THRIVE IN ADULT: ICD-10-CM

## 2023-07-25 DIAGNOSIS — R26.2 DIFFICULTY WALKING: ICD-10-CM

## 2023-07-25 DIAGNOSIS — F41.1 GENERALIZED ANXIETY DISORDER: ICD-10-CM

## 2023-07-28 ASSESSMENT — ENCOUNTER SYMPTOMS
DIARRHEA: 0
SHORTNESS OF BREATH: 0
RHINORRHEA: 0
COUGH: 0
CONSTIPATION: 0
BACK PAIN: 0
ABDOMINAL PAIN: 0
VOMITING: 0
SORE THROAT: 0
PHOTOPHOBIA: 0
WHEEZING: 0

## 2023-07-28 NOTE — PROGRESS NOTES
Marisela Mcguire (:  1950) is a 67 y.o. female. Subjective   SUBJECTIVE/OBJECTIVE:  HPI  Location: [facility]. Room 505. All nursing and progress notes reviewed. Amita Correa is a pleasant, cooperative, oriented female sitting upright in wheelchair. She is awake, alert, and in no acute distress. Richa pad in place. Continues with dialysis in facility on Monday, Tuesday, Thursday, and Friday. Followed by nephrology. Wound care following for sacral ulcer. No complaints today. States she is leaving end of August. No concerns per nursing. Past Medical History:   Diagnosis Date    Anemia in chronic kidney disease     Anxiety and depression     Arthritis     Chronic pain syndrome     COVID-19 2020    COVID-19     Decreased dorsalis pedis pulse 10/25/2022    Diabetes mellitus ([address])     Dysphagia, oral phase     ESRD on hemodialysis ([address]) 10/25/2022    GERD (gastroesophageal reflux disease)     Hemodialysis patient ([address])     M-W-F    Hyperkalemia     Hypertension     Hypertensive kidney disease with stage 4 chronic kidney disease (HCC)     Insomnia     Kidney stones     MDD (major depressive disorder)     Moderate protein-calorie malnutrition (HCC)     Morbid obesity (HCC)     Muscle weakness (generalized)     Obesity     Pressure injury of sacral region, stage 3 ([address]) 10/25/2022    Pressure ulcer of sacral region     Sacral pressure ulcer 2021    stage 4    Unspecified osteoarthritis, unspecified site     Weakness generalized        No Known Allergies     Review of Systems   Constitutional:  Negative for appetite change, chills, diaphoresis, fatigue and fever. HENT:  Negative for congestion, hearing loss, rhinorrhea and sore throat. Eyes:  Negative for photophobia and visual disturbance. Respiratory:  Negative for cough, shortness of breath and wheezing. Cardiovascular:  Positive for leg swelling. Negative for chest pain and palpitations.    Gastrointestinal:  Negative for

## 2023-08-07 ENCOUNTER — OUTSIDE SERVICES (OUTPATIENT)
Dept: PRIMARY CARE CLINIC | Age: 73
End: 2023-08-07

## 2023-08-07 DIAGNOSIS — R26.2 DIFFICULTY WALKING: ICD-10-CM

## 2023-08-07 DIAGNOSIS — Z79.4 TYPE 2 DIABETES MELLITUS WITH HYPERGLYCEMIA, WITH LONG-TERM CURRENT USE OF INSULIN (HCC): ICD-10-CM

## 2023-08-07 DIAGNOSIS — Z99.2 ESRD ON HEMODIALYSIS (HCC): Primary | ICD-10-CM

## 2023-08-07 DIAGNOSIS — E78.00 PURE HYPERCHOLESTEROLEMIA: ICD-10-CM

## 2023-08-07 DIAGNOSIS — E66.8 MODERATE OBESITY: ICD-10-CM

## 2023-08-07 DIAGNOSIS — N18.6 ESRD ON HEMODIALYSIS (HCC): Primary | ICD-10-CM

## 2023-08-07 DIAGNOSIS — L89.154 PRESSURE INJURY OF SACRAL REGION, STAGE 4 (HCC): ICD-10-CM

## 2023-08-07 DIAGNOSIS — E11.65 TYPE 2 DIABETES MELLITUS WITH HYPERGLYCEMIA, WITH LONG-TERM CURRENT USE OF INSULIN (HCC): ICD-10-CM

## 2023-08-07 DIAGNOSIS — N18.9 ANEMIA IN CHRONIC KIDNEY DISEASE, UNSPECIFIED CKD STAGE: ICD-10-CM

## 2023-08-07 DIAGNOSIS — D63.1 ANEMIA IN CHRONIC KIDNEY DISEASE, UNSPECIFIED CKD STAGE: ICD-10-CM

## 2023-08-07 DIAGNOSIS — N28.89 HYPERTENSION SECONDARY TO OTHER RENAL DISORDERS: ICD-10-CM

## 2023-08-07 DIAGNOSIS — M62.81 MUSCLE WEAKNESS (GENERALIZED): ICD-10-CM

## 2023-08-07 DIAGNOSIS — I15.1 HYPERTENSION SECONDARY TO OTHER RENAL DISORDERS: ICD-10-CM

## 2023-08-07 DIAGNOSIS — F41.1 GENERALIZED ANXIETY DISORDER: ICD-10-CM

## 2023-08-07 DIAGNOSIS — M17.0 PRIMARY OSTEOARTHRITIS OF BOTH KNEES: ICD-10-CM

## 2023-08-08 LAB
ALBUMIN SERPL-MCNC: 3 G/DL (ref 3.5–5.2)
ALP SERPL-CCNC: 77 U/L (ref 35–104)
ALT SERPL-CCNC: <5 U/L (ref 0–32)
ANION GAP SERPL CALCULATED.3IONS-SCNC: 14 MMOL/L (ref 7–16)
AST SERPL-CCNC: 11 U/L (ref 0–31)
BILIRUB SERPL-MCNC: 0.3 MG/DL (ref 0–1.2)
BUN SERPL-MCNC: 48 MG/DL (ref 6–23)
CALCIUM SERPL-MCNC: 9.4 MG/DL (ref 8.6–10.2)
CHLORIDE SERPL-SCNC: 96 MMOL/L (ref 98–107)
CO2 SERPL-SCNC: 26 MMOL/L (ref 22–29)
CREAT SERPL-MCNC: 6.7 MG/DL (ref 0.5–1)
ERYTHROCYTE [DISTWIDTH] IN BLOOD BY AUTOMATED COUNT: 12.6 % (ref 11.5–15)
GFR SERPL CREATININE-BSD FRML MDRD: 6 ML/MIN/1.73M2
GLUCOSE SERPL-MCNC: 147 MG/DL (ref 74–99)
HBA1C MFR BLD: 5.5 % (ref 4–5.6)
HCT VFR BLD AUTO: 26.6 % (ref 34–48)
HGB BLD-MCNC: 8.1 G/DL (ref 11.5–15.5)
MCH RBC QN AUTO: 33.3 PG (ref 26–35)
MCHC RBC AUTO-ENTMCNC: 30.5 G/DL (ref 32–34.5)
MCV RBC AUTO: 109.5 FL (ref 80–99.9)
PLATELET # BLD AUTO: 226 K/UL (ref 130–450)
PMV BLD AUTO: 10 FL (ref 7–12)
POTASSIUM SERPL-SCNC: 5.8 MMOL/L (ref 3.5–5)
PROT SERPL-MCNC: 6.7 G/DL (ref 6.4–8.3)
RBC # BLD AUTO: 2.43 M/UL (ref 3.5–5.5)
SODIUM SERPL-SCNC: 136 MMOL/L (ref 132–146)
WBC OTHER # BLD: 8.5 K/UL (ref 4.5–11.5)

## 2023-08-09 LAB
ANION GAP SERPL CALCULATED.3IONS-SCNC: 13 MMOL/L (ref 7–16)
BUN SERPL-MCNC: 41 MG/DL (ref 6–23)
CALCIUM SERPL-MCNC: 9.7 MG/DL (ref 8.6–10.2)
CHLORIDE SERPL-SCNC: 96 MMOL/L (ref 98–107)
CO2 SERPL-SCNC: 29 MMOL/L (ref 22–29)
CREAT SERPL-MCNC: 5.4 MG/DL (ref 0.5–1)
GFR SERPL CREATININE-BSD FRML MDRD: 8 ML/MIN/1.73M2
GLUCOSE SERPL-MCNC: 76 MG/DL (ref 74–99)
POTASSIUM SERPL-SCNC: 5.8 MMOL/L (ref 3.5–5)
SODIUM SERPL-SCNC: 138 MMOL/L (ref 132–146)

## 2023-08-11 VITALS
RESPIRATION RATE: 18 BRPM | BODY MASS INDEX: 34.37 KG/M2 | SYSTOLIC BLOOD PRESSURE: 135 MMHG | DIASTOLIC BLOOD PRESSURE: 74 MMHG | TEMPERATURE: 96.4 F | WEIGHT: 176 LBS | OXYGEN SATURATION: 94 % | HEART RATE: 80 BPM

## 2023-08-11 ASSESSMENT — ENCOUNTER SYMPTOMS
DIARRHEA: 0
VOMITING: 0
SHORTNESS OF BREATH: 0
NAUSEA: 0
ABDOMINAL PAIN: 0

## 2023-08-12 NOTE — PROGRESS NOTES
Visit Date: 08/07/2023  Kee Owens  1950  female 67 y.o. Subjective:    CC: Patient has no current complaints. HPI: Patient is pleasant and cooperative. She has a fracture of right femur and left ulnar radial wrist with ORIF. She is on chronic dialysis. She has a chronic sacral wound that is being cared for by Wound Care She states that she ambulates with a walker. ROS:  Review of Systems   Constitutional:  Negative for chills and fever. Respiratory:  Negative for shortness of breath. Cardiovascular:  Negative for chest pain. Gastrointestinal:  Negative for abdominal pain, diarrhea, nausea and vomiting. Genitourinary:  Negative for dysuria and frequency. Musculoskeletal:  Positive for arthralgias and gait problem. Skin:  Positive for wound (sacral wound). Neurological:  Negative for dizziness and headaches. Psychiatric/Behavioral:  Negative for agitation and suicidal ideas. The patient is not nervous/anxious.        Current Meds: Refer to nursing home record    PMH:    Medical Problems: reviewed and updated       Diagnosis Date    Anemia in chronic kidney disease     Anxiety and depression     Arthritis     Chronic pain syndrome     COVID-19 05/2020    COVID-19     Decreased dorsalis pedis pulse 10/25/2022    Diabetes mellitus ([address])     Dysphagia, oral phase     ESRD on hemodialysis ([address]) 10/25/2022    GERD (gastroesophageal reflux disease)     Hemodialysis patient ([address])     M-W-F    Hyperkalemia     Hypertension     Hypertensive kidney disease with stage 4 chronic kidney disease (HCC)     Insomnia     Kidney stones     MDD (major depressive disorder)     Moderate protein-calorie malnutrition (HCC)     Morbid obesity (HCC)     Muscle weakness (generalized)     Obesity     Pressure injury of sacral region, stage 3 ([address]) 10/25/2022    Pressure ulcer of sacral region     Sacral pressure ulcer 08/03/2021    stage 4    Unspecified osteoarthritis, unspecified site     Weakness

## 2023-09-12 LAB
ALBUMIN SERPL-MCNC: 3.2 G/DL (ref 3.5–5.2)
ALP SERPL-CCNC: 81 U/L (ref 35–104)
ALT SERPL-CCNC: <5 U/L (ref 0–32)
ANION GAP SERPL CALCULATED.3IONS-SCNC: 16 MMOL/L (ref 7–16)
AST SERPL-CCNC: 18 U/L (ref 0–31)
BILIRUB SERPL-MCNC: 0.2 MG/DL (ref 0–1.2)
BUN SERPL-MCNC: 43 MG/DL (ref 6–23)
CALCIUM SERPL-MCNC: 9.5 MG/DL (ref 8.6–10.2)
CHLORIDE SERPL-SCNC: 92 MMOL/L (ref 98–107)
CO2 SERPL-SCNC: 25 MMOL/L (ref 22–29)
CREAT SERPL-MCNC: 5.2 MG/DL (ref 0.5–1)
ERYTHROCYTE [DISTWIDTH] IN BLOOD BY AUTOMATED COUNT: 12.2 % (ref 11.5–15)
GFR SERPL CREATININE-BSD FRML MDRD: 8 ML/MIN/1.73M2
GLUCOSE SERPL-MCNC: 156 MG/DL (ref 74–99)
HCT VFR BLD AUTO: 30.1 % (ref 34–48)
HGB BLD-MCNC: 9.5 G/DL (ref 11.5–15.5)
MCH RBC QN AUTO: 32.9 PG (ref 26–35)
MCHC RBC AUTO-ENTMCNC: 31.6 G/DL (ref 32–34.5)
MCV RBC AUTO: 104.2 FL (ref 80–99.9)
PLATELET # BLD AUTO: 209 K/UL (ref 130–450)
PMV BLD AUTO: 10.9 FL (ref 7–12)
POTASSIUM SERPL-SCNC: 5.9 MMOL/L (ref 3.5–5)
PROT SERPL-MCNC: 6.5 G/DL (ref 6.4–8.3)
RBC # BLD AUTO: 2.89 M/UL (ref 3.5–5.5)
SODIUM SERPL-SCNC: 133 MMOL/L (ref 132–146)
WBC OTHER # BLD: 8.8 K/UL (ref 4.5–11.5)

## 2023-09-13 LAB — IRON SERPL-MCNC: 145 UG/DL (ref 37–145)

## 2023-09-20 LAB
ANION GAP SERPL CALCULATED.3IONS-SCNC: 17 MMOL/L (ref 7–16)
BUN SERPL-MCNC: 39 MG/DL (ref 6–23)
CALCIUM SERPL-MCNC: 9.6 MG/DL (ref 8.6–10.2)
CHLORIDE SERPL-SCNC: 97 MMOL/L (ref 98–107)
CO2 SERPL-SCNC: 24 MMOL/L (ref 22–29)
CREAT SERPL-MCNC: 4.8 MG/DL (ref 0.5–1)
GFR SERPL CREATININE-BSD FRML MDRD: 9 ML/MIN/1.73M2
GLUCOSE SERPL-MCNC: 73 MG/DL (ref 74–99)
POTASSIUM SERPL-SCNC: 5.6 MMOL/L (ref 3.5–5)
SODIUM SERPL-SCNC: 138 MMOL/L (ref 132–146)

## 2023-10-10 ENCOUNTER — OUTSIDE SERVICES (OUTPATIENT)
Dept: PRIMARY CARE CLINIC | Age: 73
End: 2023-10-10
Payer: COMMERCIAL

## 2023-10-10 DIAGNOSIS — E78.00 PURE HYPERCHOLESTEROLEMIA: ICD-10-CM

## 2023-10-10 DIAGNOSIS — L89.154 PRESSURE INJURY OF SACRAL REGION, STAGE 4 (HCC): ICD-10-CM

## 2023-10-10 DIAGNOSIS — E11.65 TYPE 2 DIABETES MELLITUS WITH HYPERGLYCEMIA, WITH LONG-TERM CURRENT USE OF INSULIN (HCC): ICD-10-CM

## 2023-10-10 DIAGNOSIS — Z79.4 TYPE 2 DIABETES MELLITUS WITH HYPERGLYCEMIA, WITH LONG-TERM CURRENT USE OF INSULIN (HCC): ICD-10-CM

## 2023-10-10 DIAGNOSIS — N18.9 ANEMIA IN CHRONIC KIDNEY DISEASE, UNSPECIFIED CKD STAGE: ICD-10-CM

## 2023-10-10 DIAGNOSIS — N18.6 ESRD ON HEMODIALYSIS (HCC): Primary | ICD-10-CM

## 2023-10-10 DIAGNOSIS — I15.1 HYPERTENSION SECONDARY TO OTHER RENAL DISORDERS: ICD-10-CM

## 2023-10-10 DIAGNOSIS — N28.89 HYPERTENSION SECONDARY TO OTHER RENAL DISORDERS: ICD-10-CM

## 2023-10-10 DIAGNOSIS — Z99.2 ESRD ON HEMODIALYSIS (HCC): Primary | ICD-10-CM

## 2023-10-10 DIAGNOSIS — D63.1 ANEMIA IN CHRONIC KIDNEY DISEASE, UNSPECIFIED CKD STAGE: ICD-10-CM

## 2023-10-10 LAB
ALBUMIN SERPL-MCNC: 3.2 G/DL (ref 3.5–5.2)
ALP SERPL-CCNC: 80 U/L (ref 35–104)
ALT SERPL-CCNC: <5 U/L (ref 0–32)
ANION GAP SERPL CALCULATED.3IONS-SCNC: 30 MMOL/L (ref 7–16)
AST SERPL-CCNC: 16 U/L (ref 0–31)
BILIRUB SERPL-MCNC: 0.3 MG/DL (ref 0–1.2)
BUN SERPL-MCNC: 22 MG/DL (ref 6–23)
CALCIUM SERPL-MCNC: 9.9 MG/DL (ref 8.6–10.2)
CHLORIDE SERPL-SCNC: 92 MMOL/L (ref 98–107)
CO2 SERPL-SCNC: 15 MMOL/L (ref 22–29)
CREAT SERPL-MCNC: 3.2 MG/DL (ref 0.5–1)
ERYTHROCYTE [DISTWIDTH] IN BLOOD BY AUTOMATED COUNT: 13 % (ref 11.5–15)
GFR SERPL CREATININE-BSD FRML MDRD: 15 ML/MIN/1.73M2
GLUCOSE SERPL-MCNC: 169 MG/DL (ref 74–99)
HCT VFR BLD AUTO: 32.4 % (ref 34–48)
HGB BLD-MCNC: 9.9 G/DL (ref 11.5–15.5)
MCH RBC QN AUTO: 32.5 PG (ref 26–35)
MCHC RBC AUTO-ENTMCNC: 30.6 G/DL (ref 32–34.5)
MCV RBC AUTO: 106.2 FL (ref 80–99.9)
PLATELET # BLD AUTO: 233 K/UL (ref 130–450)
PMV BLD AUTO: 10.4 FL (ref 7–12)
POTASSIUM SERPL-SCNC: 4.2 MMOL/L (ref 3.5–5)
PROT SERPL-MCNC: 7.1 G/DL (ref 6.4–8.3)
RBC # BLD AUTO: 3.05 M/UL (ref 3.5–5.5)
SODIUM SERPL-SCNC: 137 MMOL/L (ref 132–146)
WBC OTHER # BLD: 14.6 K/UL (ref 4.5–11.5)

## 2023-10-10 PROCEDURE — 99309 SBSQ NF CARE MODERATE MDM 30: CPT

## 2023-10-19 ENCOUNTER — OUTSIDE SERVICES (OUTPATIENT)
Dept: PRIMARY CARE CLINIC | Age: 73
End: 2023-10-19
Payer: COMMERCIAL

## 2023-10-19 DIAGNOSIS — E11.65 TYPE 2 DIABETES MELLITUS WITH HYPERGLYCEMIA, WITH LONG-TERM CURRENT USE OF INSULIN (HCC): ICD-10-CM

## 2023-10-19 DIAGNOSIS — F41.1 GENERALIZED ANXIETY DISORDER: ICD-10-CM

## 2023-10-19 DIAGNOSIS — N18.6 ESRD ON HEMODIALYSIS (HCC): ICD-10-CM

## 2023-10-19 DIAGNOSIS — E66.8 MODERATE OBESITY: ICD-10-CM

## 2023-10-19 DIAGNOSIS — I15.1 HYPERTENSION SECONDARY TO OTHER RENAL DISORDERS: ICD-10-CM

## 2023-10-19 DIAGNOSIS — M17.0 PRIMARY OSTEOARTHRITIS OF BOTH KNEES: ICD-10-CM

## 2023-10-19 DIAGNOSIS — Z99.2 ESRD ON HEMODIALYSIS (HCC): ICD-10-CM

## 2023-10-19 DIAGNOSIS — D63.1 ANEMIA IN CHRONIC KIDNEY DISEASE, UNSPECIFIED CKD STAGE: ICD-10-CM

## 2023-10-19 DIAGNOSIS — L89.154 PRESSURE INJURY OF SACRAL REGION, STAGE 4 (HCC): ICD-10-CM

## 2023-10-19 DIAGNOSIS — R26.2 DIFFICULTY WALKING: ICD-10-CM

## 2023-10-19 DIAGNOSIS — N18.9 ANEMIA IN CHRONIC KIDNEY DISEASE, UNSPECIFIED CKD STAGE: ICD-10-CM

## 2023-10-19 DIAGNOSIS — B02.9 HERPES ZOSTER WITHOUT COMPLICATION: Primary | ICD-10-CM

## 2023-10-19 DIAGNOSIS — Z79.4 TYPE 2 DIABETES MELLITUS WITH HYPERGLYCEMIA, WITH LONG-TERM CURRENT USE OF INSULIN (HCC): ICD-10-CM

## 2023-10-19 DIAGNOSIS — E78.00 PURE HYPERCHOLESTEROLEMIA: ICD-10-CM

## 2023-10-19 DIAGNOSIS — M62.81 MUSCLE WEAKNESS (GENERALIZED): ICD-10-CM

## 2023-10-19 DIAGNOSIS — N28.89 HYPERTENSION SECONDARY TO OTHER RENAL DISORDERS: ICD-10-CM

## 2023-10-19 PROCEDURE — 99309 SBSQ NF CARE MODERATE MDM 30: CPT | Performed by: INTERNAL MEDICINE

## 2023-10-21 VITALS
SYSTOLIC BLOOD PRESSURE: 80 MMHG | BODY MASS INDEX: 34.1 KG/M2 | WEIGHT: 174.6 LBS | DIASTOLIC BLOOD PRESSURE: 40 MMHG | RESPIRATION RATE: 18 BRPM | OXYGEN SATURATION: 99 % | TEMPERATURE: 97.5 F | HEART RATE: 70 BPM

## 2023-10-21 ASSESSMENT — ENCOUNTER SYMPTOMS
ABDOMINAL PAIN: 0
DIARRHEA: 0
NAUSEA: 0
VOMITING: 0
SHORTNESS OF BREATH: 0

## 2023-10-22 NOTE — PROGRESS NOTES
Visit Date: 10/19/2023  Allison Joel (:  1950) is a 75 y.o. female. Subjective   Patient complaints of pain from rash on her back, hips and down her legs to thigh area. SUBJECTIVE/OBJECTIVE:  Patient is seen for follow-up examination. She has a rash/infection of her bilateral flanks and down to her hips and upper thigh. Patient said it was diagnosed as shingles and states that it is getting worse and has switched to other side since it started. It is possible it could be a reaction to doxycycline. Location:  All nursing and progress notes reviewed. Past Medical History:   Diagnosis Date    Anemia in chronic kidney disease     Anxiety and depression     Arthritis     Chronic pain syndrome     COVID-19 2020    COVID-19     Decreased dorsalis pedis pulse 10/25/2022    Diabetes mellitus ([address])     Dysphagia, oral phase     ESRD on hemodialysis ([address]) 10/25/2022    GERD (gastroesophageal reflux disease)     Hemodialysis patient ([address])     M-W-F    Hyperkalemia     Hypertension     Hypertensive kidney disease with stage 4 chronic kidney disease (HCC)     Insomnia     Kidney stones     MDD (major depressive disorder)     Moderate protein-calorie malnutrition (HCC)     Morbid obesity (HCC)     Muscle weakness (generalized)     Obesity     Pressure injury of sacral region, stage 3 ([address]) 10/25/2022    Pressure ulcer of sacral region     Sacral pressure ulcer 2021    stage 4    Unspecified osteoarthritis, unspecified site     Weakness generalized        No Known Allergies   Current Outpatient Medications   Medication Sig Dispense Refill    divalproex (DEPAKOTE SPRINKLE) 125 MG DR capsule Take 1 capsule by mouth every 8 hours for 15 doses 60 capsule 3    prochlorperazine (COMPAZINE) 10 MG tablet Take 1 tablet by mouth every 6 hours as needed (nausea) 120 tablet 3    miconazole (MICOTIN) 2 % powder Apply topically 2 times daily.  45 g 1    vitamin C (ASCORBIC ACID) 500 MG tablet Take 1 tablet by

## 2023-10-30 ENCOUNTER — APPOINTMENT (OUTPATIENT)
Dept: CT IMAGING | Age: 73
DRG: 640 | End: 2023-10-30
Payer: COMMERCIAL

## 2023-10-30 ENCOUNTER — TELEPHONE (OUTPATIENT)
Dept: ORTHOPEDIC SURGERY | Age: 73
End: 2023-10-30

## 2023-10-30 ENCOUNTER — HOSPITAL ENCOUNTER (INPATIENT)
Age: 73
LOS: 2 days | Discharge: HOME | DRG: 640 | End: 2023-11-01
Attending: STUDENT IN AN ORGANIZED HEALTH CARE EDUCATION/TRAINING PROGRAM | Admitting: FAMILY MEDICINE
Payer: COMMERCIAL

## 2023-10-30 DIAGNOSIS — E87.5 HYPERKALEMIA: ICD-10-CM

## 2023-10-30 DIAGNOSIS — R11.2 NAUSEA AND VOMITING, UNSPECIFIED VOMITING TYPE: Primary | ICD-10-CM

## 2023-10-30 LAB
ALBUMIN SERPL-MCNC: 2.7 G/DL (ref 3.5–5.2)
ALP SERPL-CCNC: 106 U/L (ref 35–104)
ALT SERPL-CCNC: 9 U/L (ref 0–32)
ANION GAP SERPL CALCULATED.3IONS-SCNC: 15 MMOL/L (ref 7–16)
AST SERPL-CCNC: 9 U/L (ref 0–31)
BASOPHILS # BLD: 0.04 K/UL (ref 0–0.2)
BASOPHILS NFR BLD: 0 % (ref 0–2)
BILIRUB SERPL-MCNC: 0.3 MG/DL (ref 0–1.2)
BUN SERPL-MCNC: 73 MG/DL (ref 6–23)
CALCIUM SERPL-MCNC: 9.5 MG/DL (ref 8.6–10.2)
CHLORIDE SERPL-SCNC: 91 MMOL/L (ref 98–107)
CHP ED QC CHECK: NORMAL
CHP ED QC CHECK: NORMAL
CO2 SERPL-SCNC: 29 MMOL/L (ref 22–29)
CREAT SERPL-MCNC: 6.9 MG/DL (ref 0.5–1)
EOSINOPHIL # BLD: 0.02 K/UL (ref 0.05–0.5)
EOSINOPHILS RELATIVE PERCENT: 0 % (ref 0–6)
ERYTHROCYTE [DISTWIDTH] IN BLOOD BY AUTOMATED COUNT: 13.5 % (ref 11.5–15)
GFR SERPL CREATININE-BSD FRML MDRD: 6 ML/MIN/1.73M2
GLUCOSE BLD-MCNC: 132 MG/DL
GLUCOSE BLD-MCNC: 132 MG/DL (ref 74–99)
GLUCOSE BLD-MCNC: 220 MG/DL
GLUCOSE BLD-MCNC: 220 MG/DL (ref 74–99)
GLUCOSE BLD-MCNC: 90 MG/DL (ref 74–99)
GLUCOSE SERPL-MCNC: 228 MG/DL (ref 74–99)
HCT VFR BLD AUTO: 28.8 % (ref 34–48)
HGB BLD-MCNC: 9.2 G/DL (ref 11.5–15.5)
IMM GRANULOCYTES # BLD AUTO: 0.44 K/UL (ref 0–0.58)
IMM GRANULOCYTES NFR BLD: 3 % (ref 0–5)
LACTATE BLDV-SCNC: 2 MMOL/L (ref 0.5–1.9)
LYMPHOCYTES NFR BLD: 0.51 K/UL (ref 1.5–4)
LYMPHOCYTES RELATIVE PERCENT: 3 % (ref 20–42)
MCH RBC QN AUTO: 32.9 PG (ref 26–35)
MCHC RBC AUTO-ENTMCNC: 31.9 G/DL (ref 32–34.5)
MCV RBC AUTO: 102.9 FL (ref 80–99.9)
MONOCYTES NFR BLD: 0.7 K/UL (ref 0.1–0.95)
MONOCYTES NFR BLD: 4 % (ref 2–12)
NEUTROPHILS NFR BLD: 90 % (ref 43–80)
NEUTS SEG NFR BLD: 15.79 K/UL (ref 1.8–7.3)
PLATELET # BLD AUTO: 172 K/UL (ref 130–450)
PMV BLD AUTO: 10.5 FL (ref 7–12)
POTASSIUM SERPL-SCNC: 5.8 MMOL/L (ref 3.5–5)
PROT SERPL-MCNC: 6.9 G/DL (ref 6.4–8.3)
RBC # BLD AUTO: 2.8 M/UL (ref 3.5–5.5)
RBC # BLD: ABNORMAL 10*6/UL
SODIUM SERPL-SCNC: 135 MMOL/L (ref 132–146)
WBC OTHER # BLD: 17.5 K/UL (ref 4.5–11.5)

## 2023-10-30 PROCEDURE — 82962 GLUCOSE BLOOD TEST: CPT

## 2023-10-30 PROCEDURE — 6360000002 HC RX W HCPCS: Performed by: FAMILY MEDICINE

## 2023-10-30 PROCEDURE — 5A1D70Z PERFORMANCE OF URINARY FILTRATION, INTERMITTENT, LESS THAN 6 HOURS PER DAY: ICD-10-PCS | Performed by: STUDENT IN AN ORGANIZED HEALTH CARE EDUCATION/TRAINING PROGRAM

## 2023-10-30 PROCEDURE — 80053 COMPREHEN METABOLIC PANEL: CPT

## 2023-10-30 PROCEDURE — 2500000003 HC RX 250 WO HCPCS

## 2023-10-30 PROCEDURE — 99285 EMERGENCY DEPT VISIT HI MDM: CPT

## 2023-10-30 PROCEDURE — 87040 BLOOD CULTURE FOR BACTERIA: CPT

## 2023-10-30 PROCEDURE — 1200000000 HC SEMI PRIVATE

## 2023-10-30 PROCEDURE — 2580000003 HC RX 258

## 2023-10-30 PROCEDURE — 6370000000 HC RX 637 (ALT 250 FOR IP)

## 2023-10-30 PROCEDURE — 83605 ASSAY OF LACTIC ACID: CPT

## 2023-10-30 PROCEDURE — 85025 COMPLETE CBC W/AUTO DIFF WBC: CPT

## 2023-10-30 PROCEDURE — 6370000000 HC RX 637 (ALT 250 FOR IP): Performed by: FAMILY MEDICINE

## 2023-10-30 PROCEDURE — 93005 ELECTROCARDIOGRAM TRACING: CPT | Performed by: STUDENT IN AN ORGANIZED HEALTH CARE EDUCATION/TRAINING PROGRAM

## 2023-10-30 PROCEDURE — 2580000003 HC RX 258: Performed by: FAMILY MEDICINE

## 2023-10-30 PROCEDURE — 6360000002 HC RX W HCPCS

## 2023-10-30 PROCEDURE — 74176 CT ABD & PELVIS W/O CONTRAST: CPT

## 2023-10-30 PROCEDURE — 96365 THER/PROPH/DIAG IV INF INIT: CPT

## 2023-10-30 PROCEDURE — 96361 HYDRATE IV INFUSION ADD-ON: CPT

## 2023-10-30 PROCEDURE — 70450 CT HEAD/BRAIN W/O DYE: CPT

## 2023-10-30 RX ORDER — DEXTROSE MONOHYDRATE 100 MG/ML
INJECTION, SOLUTION INTRAVENOUS CONTINUOUS PRN
Status: DISCONTINUED | OUTPATIENT
Start: 2023-10-30 | End: 2023-11-01 | Stop reason: HOSPADM

## 2023-10-30 RX ORDER — ONDANSETRON 4 MG/1
4 TABLET, ORALLY DISINTEGRATING ORAL EVERY 8 HOURS PRN
Status: DISCONTINUED | OUTPATIENT
Start: 2023-10-30 | End: 2023-10-30 | Stop reason: ALTCHOICE

## 2023-10-30 RX ORDER — SENNOSIDES A AND B 8.6 MG/1
2 TABLET, FILM COATED ORAL 2 TIMES DAILY
Status: DISCONTINUED | OUTPATIENT
Start: 2023-10-30 | End: 2023-11-01 | Stop reason: HOSPADM

## 2023-10-30 RX ORDER — 0.9 % SODIUM CHLORIDE 0.9 %
500 INTRAVENOUS SOLUTION INTRAVENOUS ONCE
Status: COMPLETED | OUTPATIENT
Start: 2023-10-30 | End: 2023-10-30

## 2023-10-30 RX ORDER — TRAZODONE HYDROCHLORIDE 50 MG/1
50 TABLET ORAL NIGHTLY
Status: DISCONTINUED | OUTPATIENT
Start: 2023-10-30 | End: 2023-11-01 | Stop reason: HOSPADM

## 2023-10-30 RX ORDER — KETOROLAC TROMETHAMINE 15 MG/ML
30 INJECTION, SOLUTION INTRAMUSCULAR; INTRAVENOUS EVERY 6 HOURS PRN
Status: DISCONTINUED | OUTPATIENT
Start: 2023-10-30 | End: 2023-10-30

## 2023-10-30 RX ORDER — SODIUM CHLORIDE 0.9 % (FLUSH) 0.9 %
5-40 SYRINGE (ML) INJECTION EVERY 12 HOURS SCHEDULED
Status: DISCONTINUED | OUTPATIENT
Start: 2023-10-30 | End: 2023-11-01 | Stop reason: HOSPADM

## 2023-10-30 RX ORDER — SODIUM CHLORIDE 9 MG/ML
INJECTION, SOLUTION INTRAVENOUS PRN
Status: DISCONTINUED | OUTPATIENT
Start: 2023-10-30 | End: 2023-11-01 | Stop reason: HOSPADM

## 2023-10-30 RX ORDER — POLYETHYLENE GLYCOL 3350 17 G/17G
17 POWDER, FOR SOLUTION ORAL DAILY PRN
Status: DISCONTINUED | OUTPATIENT
Start: 2023-10-30 | End: 2023-11-01 | Stop reason: HOSPADM

## 2023-10-30 RX ORDER — ACETAMINOPHEN 650 MG/1
650 SUPPOSITORY RECTAL EVERY 6 HOURS PRN
Status: DISCONTINUED | OUTPATIENT
Start: 2023-10-30 | End: 2023-11-01 | Stop reason: HOSPADM

## 2023-10-30 RX ORDER — SEVELAMER CARBONATE 800 MG/1
800 TABLET, FILM COATED ORAL
Status: DISCONTINUED | OUTPATIENT
Start: 2023-10-31 | End: 2023-11-01 | Stop reason: HOSPADM

## 2023-10-30 RX ORDER — HEPARIN SODIUM 10000 [USP'U]/ML
5000 INJECTION, SOLUTION INTRAVENOUS; SUBCUTANEOUS EVERY 8 HOURS
Status: DISCONTINUED | OUTPATIENT
Start: 2023-10-30 | End: 2023-11-01 | Stop reason: HOSPADM

## 2023-10-30 RX ORDER — METOPROLOL SUCCINATE 25 MG/1
25 TABLET, EXTENDED RELEASE ORAL DAILY
Status: DISCONTINUED | OUTPATIENT
Start: 2023-10-30 | End: 2023-11-01 | Stop reason: HOSPADM

## 2023-10-30 RX ORDER — ESCITALOPRAM OXALATE 10 MG/1
10 TABLET ORAL DAILY
Status: DISCONTINUED | OUTPATIENT
Start: 2023-10-30 | End: 2023-11-01 | Stop reason: HOSPADM

## 2023-10-30 RX ORDER — MIDODRINE HYDROCHLORIDE 5 MG/1
15 TABLET ORAL
Status: DISCONTINUED | OUTPATIENT
Start: 2023-11-01 | End: 2023-11-01 | Stop reason: HOSPADM

## 2023-10-30 RX ORDER — SENNOSIDES A AND B 8.6 MG/1
1 TABLET, FILM COATED ORAL DAILY PRN
Status: DISCONTINUED | OUTPATIENT
Start: 2023-10-30 | End: 2023-11-01 | Stop reason: HOSPADM

## 2023-10-30 RX ORDER — BUSPIRONE HYDROCHLORIDE 10 MG/1
10 TABLET ORAL DAILY
Status: DISCONTINUED | OUTPATIENT
Start: 2023-10-30 | End: 2023-10-31

## 2023-10-30 RX ORDER — CALCIUM GLUCONATE 94 MG/ML
1000 INJECTION, SOLUTION INTRAVENOUS ONCE
Status: DISCONTINUED | OUTPATIENT
Start: 2023-10-30 | End: 2023-10-30 | Stop reason: CLARIF

## 2023-10-30 RX ORDER — PROCHLORPERAZINE EDISYLATE 5 MG/ML
10 INJECTION INTRAMUSCULAR; INTRAVENOUS EVERY 6 HOURS PRN
Status: DISCONTINUED | OUTPATIENT
Start: 2023-10-30 | End: 2023-11-01 | Stop reason: HOSPADM

## 2023-10-30 RX ORDER — ENOXAPARIN SODIUM 100 MG/ML
40 INJECTION SUBCUTANEOUS DAILY
Status: DISCONTINUED | OUTPATIENT
Start: 2023-10-30 | End: 2023-10-30 | Stop reason: ALTCHOICE

## 2023-10-30 RX ORDER — PROCHLORPERAZINE MALEATE 10 MG
10 TABLET ORAL EVERY 6 HOURS PRN
Status: DISCONTINUED | OUTPATIENT
Start: 2023-10-30 | End: 2023-11-01 | Stop reason: HOSPADM

## 2023-10-30 RX ORDER — FUROSEMIDE 10 MG/ML
40 INJECTION INTRAMUSCULAR; INTRAVENOUS ONCE
Status: DISCONTINUED | OUTPATIENT
Start: 2023-10-30 | End: 2023-10-30

## 2023-10-30 RX ORDER — CALCIUM GLUCONATE 10 MG/ML
1000 INJECTION, SOLUTION INTRAVENOUS ONCE
Status: COMPLETED | OUTPATIENT
Start: 2023-10-30 | End: 2023-10-30

## 2023-10-30 RX ORDER — BISACODYL 10 MG
10 SUPPOSITORY, RECTAL RECTAL DAILY PRN
Status: DISCONTINUED | OUTPATIENT
Start: 2023-10-30 | End: 2023-11-01 | Stop reason: HOSPADM

## 2023-10-30 RX ORDER — ONDANSETRON 2 MG/ML
4 INJECTION INTRAMUSCULAR; INTRAVENOUS EVERY 6 HOURS PRN
Status: DISCONTINUED | OUTPATIENT
Start: 2023-10-30 | End: 2023-10-30 | Stop reason: ALTCHOICE

## 2023-10-30 RX ORDER — ACETAMINOPHEN 325 MG/1
650 TABLET ORAL EVERY 6 HOURS PRN
Status: DISCONTINUED | OUTPATIENT
Start: 2023-10-30 | End: 2023-11-01 | Stop reason: HOSPADM

## 2023-10-30 RX ORDER — INSULIN GLARGINE 100 [IU]/ML
6 INJECTION, SOLUTION SUBCUTANEOUS NIGHTLY
Status: DISCONTINUED | OUTPATIENT
Start: 2023-10-30 | End: 2023-11-01 | Stop reason: HOSPADM

## 2023-10-30 RX ORDER — SODIUM CHLORIDE 0.9 % (FLUSH) 0.9 %
5-40 SYRINGE (ML) INJECTION PRN
Status: DISCONTINUED | OUTPATIENT
Start: 2023-10-30 | End: 2023-11-01 | Stop reason: HOSPADM

## 2023-10-30 RX ADMIN — SODIUM CHLORIDE 500 ML: 9 INJECTION, SOLUTION INTRAVENOUS at 15:14

## 2023-10-30 RX ADMIN — DEXTROSE MONOHYDRATE 250 ML: 100 INJECTION, SOLUTION INTRAVENOUS at 17:53

## 2023-10-30 RX ADMIN — HEPARIN SODIUM 5000 UNITS: 10000 INJECTION INTRAVENOUS; SUBCUTANEOUS at 22:34

## 2023-10-30 RX ADMIN — INSULIN HUMAN 10 UNITS: 100 INJECTION, SOLUTION PARENTERAL at 17:48

## 2023-10-30 RX ADMIN — TRAZODONE HYDROCHLORIDE 50 MG: 50 TABLET ORAL at 22:32

## 2023-10-30 RX ADMIN — SODIUM CHLORIDE, PRESERVATIVE FREE 10 ML: 5 INJECTION INTRAVENOUS at 22:33

## 2023-10-30 RX ADMIN — CALCIUM GLUCONATE 1000 MG: 10 INJECTION, SOLUTION INTRAVENOUS at 15:15

## 2023-10-30 RX ADMIN — SODIUM BICARBONATE 50 MEQ: 84 INJECTION INTRAVENOUS at 17:48

## 2023-10-30 ASSESSMENT — ENCOUNTER SYMPTOMS
WHEEZING: 0
NAUSEA: 0
VOMITING: 0
DIARRHEA: 0
PHOTOPHOBIA: 0
TROUBLE SWALLOWING: 0
SHORTNESS OF BREATH: 0
VOICE CHANGE: 0
ABDOMINAL PAIN: 0

## 2023-10-30 NOTE — TELEPHONE ENCOUNTER
Na Schwarz @ [facility] called to report that patient was taken to [facility] ER this morning for vomiting and pain Rt knee, unable to bear weight. Hx of  ORIF Right comminuted distal metaphyseal osteoporotic pathologic femur fracture 11/3/2022 by Dr Michele Escobedo. Patient is scheduled to see you on 11/3/2023 [place] for Rt knee pain. Na Schwarz states mobile X-ray was done at facility which read negative for fracture. Unable to obtain films. Message sent in case you wanted ortho resident to see her while in ER.

## 2023-10-30 NOTE — CONSULTS
GLUCOSEU 100 03/14/2023 04:50 PM    GLUCOSEU NEGATIVE 08/17/2011 10:30 PM    KETUA Negative 03/14/2023 04:50 PM    UROBILINOGEN 0.2 03/14/2023 04:50 PM    BILIRUBINUR Negative 03/14/2023 04:50 PM    BILIRUBINUR NEGATIVE 08/17/2011 10:30 PM       No results found for: \"MAGDA\", \"CREURRAN\", \"OSMOU\"    No components found for: \"URIC\"    No results found for: \"LIPIDPAN\"      Assessment and Plan:    ESRD on HD  Outpatient at the dialysis den at [address] [place] [address] HD treatment today but did not get entire treatment  Continue HD while inpatient - next HD planned for tomorrow per patient request  Monitor labs    2. Anemia in CKD  Hemoglobin target 10-12 g/dL  Hg 9.2 today  ARUN managed outpatient - last dose 9/7  Transfuse Hg < 7  Monitor H/H    3. Secondary hyperparathyroidism of renal origin    and phos 5.8 on 10/9  On TUMS outpatient  Monitor labs    4. Hypertension in CKD  BP goal < 130/80  BP at goal  Monitor Bps    5.  Hyperkalemia  K 5.8 on admission  Did not get full HD treatment earlier today at [facility]  Hyperkalemia protocol in ER tonight  HD while inpatient - plan for next HD treatment tomorrow  Monitor labs      Saul Gallagher, PABLO - CNP

## 2023-10-31 PROBLEM — R11.2 NAUSEA AND VOMITING: Status: ACTIVE | Noted: 2023-10-31

## 2023-10-31 LAB
ANION GAP SERPL CALCULATED.3IONS-SCNC: 11 MMOL/L (ref 7–16)
BUN SERPL-MCNC: 30 MG/DL (ref 6–23)
CALCIUM SERPL-MCNC: 9.2 MG/DL (ref 8.6–10.2)
CHLORIDE SERPL-SCNC: 91 MMOL/L (ref 98–107)
CO2 SERPL-SCNC: 29 MMOL/L (ref 22–29)
CREAT SERPL-MCNC: 4 MG/DL (ref 0.5–1)
EKG ATRIAL RATE: 73 BPM
EKG P AXIS: 20 DEGREES
EKG P-R INTERVAL: 258 MS
EKG Q-T INTERVAL: 482 MS
EKG QRS DURATION: 148 MS
EKG QTC CALCULATION (BAZETT): 531 MS
EKG R AXIS: -43 DEGREES
EKG T AXIS: 99 DEGREES
EKG VENTRICULAR RATE: 73 BPM
ERYTHROCYTE [DISTWIDTH] IN BLOOD BY AUTOMATED COUNT: 13.8 % (ref 11.5–15)
GFR SERPL CREATININE-BSD FRML MDRD: 11 ML/MIN/1.73M2
GLUCOSE BLD-MCNC: 139 MG/DL (ref 74–99)
GLUCOSE BLD-MCNC: 251 MG/DL (ref 74–99)
GLUCOSE SERPL-MCNC: 244 MG/DL (ref 74–99)
HCT VFR BLD AUTO: 26.6 % (ref 34–48)
HGB BLD-MCNC: 8.2 G/DL (ref 11.5–15.5)
MCH RBC QN AUTO: 32.5 PG (ref 26–35)
MCHC RBC AUTO-ENTMCNC: 30.8 G/DL (ref 32–34.5)
MCV RBC AUTO: 105.6 FL (ref 80–99.9)
PLATELET # BLD AUTO: 183 K/UL (ref 130–450)
PMV BLD AUTO: 10.5 FL (ref 7–12)
POTASSIUM SERPL-SCNC: 4.7 MMOL/L (ref 3.5–5)
RBC # BLD AUTO: 2.52 M/UL (ref 3.5–5.5)
SODIUM SERPL-SCNC: 131 MMOL/L (ref 132–146)
WBC OTHER # BLD: 14.6 K/UL (ref 4.5–11.5)

## 2023-10-31 PROCEDURE — 6360000002 HC RX W HCPCS: Performed by: FAMILY MEDICINE

## 2023-10-31 PROCEDURE — 2580000003 HC RX 258: Performed by: FAMILY MEDICINE

## 2023-10-31 PROCEDURE — 80048 BASIC METABOLIC PNL TOTAL CA: CPT

## 2023-10-31 PROCEDURE — 85027 COMPLETE CBC AUTOMATED: CPT

## 2023-10-31 PROCEDURE — 99222 1ST HOSP IP/OBS MODERATE 55: CPT | Performed by: FAMILY MEDICINE

## 2023-10-31 PROCEDURE — 1200000000 HC SEMI PRIVATE

## 2023-10-31 PROCEDURE — 82962 GLUCOSE BLOOD TEST: CPT

## 2023-10-31 PROCEDURE — 6370000000 HC RX 637 (ALT 250 FOR IP): Performed by: FAMILY MEDICINE

## 2023-10-31 PROCEDURE — 93010 ELECTROCARDIOGRAM REPORT: CPT | Performed by: INTERNAL MEDICINE

## 2023-10-31 PROCEDURE — 90935 HEMODIALYSIS ONE EVALUATION: CPT

## 2023-10-31 PROCEDURE — 36415 COLL VENOUS BLD VENIPUNCTURE: CPT

## 2023-10-31 RX ORDER — OXYCODONE HYDROCHLORIDE AND ACETAMINOPHEN 5; 325 MG/1; MG/1
1 TABLET ORAL EVERY 6 HOURS PRN
Status: DISCONTINUED | OUTPATIENT
Start: 2023-10-31 | End: 2023-11-01 | Stop reason: HOSPADM

## 2023-10-31 RX ORDER — GABAPENTIN 100 MG/1
100 CAPSULE ORAL 3 TIMES DAILY
Status: ON HOLD | COMMUNITY

## 2023-10-31 RX ORDER — OXYCODONE HYDROCHLORIDE AND ACETAMINOPHEN 5; 325 MG/1; MG/1
1 TABLET ORAL EVERY 6 HOURS PRN
Status: ON HOLD | COMMUNITY

## 2023-10-31 RX ORDER — BUSPIRONE HYDROCHLORIDE 10 MG/1
10 TABLET ORAL DAILY
Status: DISCONTINUED | OUTPATIENT
Start: 2023-10-31 | End: 2023-11-01 | Stop reason: HOSPADM

## 2023-10-31 RX ADMIN — HEPARIN SODIUM 5000 UNITS: 10000 INJECTION INTRAVENOUS; SUBCUTANEOUS at 21:54

## 2023-10-31 RX ADMIN — SODIUM CHLORIDE, PRESERVATIVE FREE 10 ML: 5 INJECTION INTRAVENOUS at 13:18

## 2023-10-31 RX ADMIN — BUSPIRONE HYDROCHLORIDE 10 MG: 10 TABLET ORAL at 17:55

## 2023-10-31 RX ADMIN — METOPROLOL SUCCINATE 25 MG: 25 TABLET, EXTENDED RELEASE ORAL at 13:18

## 2023-10-31 RX ADMIN — OXYCODONE AND ACETAMINOPHEN 1 TABLET: 5; 325 TABLET ORAL at 13:18

## 2023-10-31 RX ADMIN — SENNOSIDES 17.2 MG: 8.6 TABLET, FILM COATED ORAL at 21:55

## 2023-10-31 RX ADMIN — TRAZODONE HYDROCHLORIDE 50 MG: 50 TABLET ORAL at 21:55

## 2023-10-31 RX ADMIN — SODIUM CHLORIDE, PRESERVATIVE FREE 10 ML: 5 INJECTION INTRAVENOUS at 21:55

## 2023-10-31 RX ADMIN — EPOETIN ALFA-EPBX 10000 UNITS: 10000 INJECTION, SOLUTION INTRAVENOUS; SUBCUTANEOUS at 17:55

## 2023-10-31 RX ADMIN — ACETAMINOPHEN 650 MG: 325 TABLET ORAL at 07:51

## 2023-10-31 RX ADMIN — SENNOSIDES 17.2 MG: 8.6 TABLET, FILM COATED ORAL at 13:18

## 2023-10-31 RX ADMIN — OXYCODONE AND ACETAMINOPHEN 1 TABLET: 5; 325 TABLET ORAL at 19:58

## 2023-10-31 RX ADMIN — SEVELAMER CARBONATE 800 MG: 800 TABLET, FILM COATED ORAL at 17:55

## 2023-10-31 RX ADMIN — INSULIN GLARGINE 6 UNITS: 100 INJECTION, SOLUTION SUBCUTANEOUS at 21:55

## 2023-10-31 RX ADMIN — ESCITALOPRAM OXALATE 10 MG: 10 TABLET ORAL at 13:18

## 2023-10-31 ASSESSMENT — PAIN SCALES - GENERAL
PAINLEVEL_OUTOF10: 8
PAINLEVEL_OUTOF10: 10

## 2023-10-31 ASSESSMENT — PAIN DESCRIPTION - LOCATION
LOCATION: COCCYX
LOCATION: ARM;LEG

## 2023-10-31 NOTE — ED NOTES
Patient refusing to be straight cathed for urine and refused morning heparin injection. Patient stated \"I need all of you people to leave me the hell alone and let me be now. \"      Maggie Simon RN  10/31/23 1930

## 2023-10-31 NOTE — ED NOTES
Attempted to straight cath patient. Patient refused. Patient educated on importance of lab testing, patient continuing to refuse stating, \"I'm not doing anything else tonight, they can all go to hell! \" Will attempt again later.      Pia Shaffer RN  10/30/23 2017

## 2023-10-31 NOTE — ED NOTES
RN spoke with dialysis who stated they will obtain patient's morning lab work     Eladia Hill [place], [place]  10/31/23 [id number]

## 2023-10-31 NOTE — CARE COORDINATION
Social Work/ Case Management Transition of Care Planning ([address], [address]): Pt presented to the hospital from [facility] with nausea and emesis. Per Yaquelin Palm with [facility] Pt is a bedhold and if we can PT/OT evals she can return without precert. NABIL spoke with Pt's son, James Hagen [phone] who states the plan is for the Pt to return to [address] [place] [address]. Wound care consult noted. Pt is a hemodialysis Pt and is established. NABIL/KAREN to follow.    [address], [address] [facility]  10/31/2023

## 2023-11-01 VITALS
HEIGHT: 61 IN | OXYGEN SATURATION: 99 % | RESPIRATION RATE: 18 BRPM | BODY MASS INDEX: 32.94 KG/M2 | WEIGHT: 174.5 LBS | TEMPERATURE: 97.6 F | HEART RATE: 69 BPM | DIASTOLIC BLOOD PRESSURE: 43 MMHG | SYSTOLIC BLOOD PRESSURE: 118 MMHG

## 2023-11-01 PROBLEM — R11.2 NAUSEA AND VOMITING: Status: RESOLVED | Noted: 2023-10-31 | Resolved: 2023-11-01

## 2023-11-01 PROBLEM — T14.8XXA WOUND INFECTION: Status: RESOLVED | Noted: 2023-03-14 | Resolved: 2023-11-01

## 2023-11-01 PROBLEM — J18.9 PNEUMONIA DUE TO INFECTIOUS ORGANISM, UNSPECIFIED LATERALITY, UNSPECIFIED PART OF LUNG: Status: RESOLVED | Noted: 2023-02-09 | Resolved: 2023-11-01

## 2023-11-01 PROBLEM — J18.9 PNEUMONIA DUE TO ORGANISM: Status: RESOLVED | Noted: 2023-02-15 | Resolved: 2023-11-01

## 2023-11-01 PROBLEM — E87.5 HYPERKALEMIA: Status: RESOLVED | Noted: 2023-10-30 | Resolved: 2023-11-01

## 2023-11-01 PROBLEM — L08.9 WOUND INFECTION: Status: RESOLVED | Noted: 2023-03-14 | Resolved: 2023-11-01

## 2023-11-01 LAB
ANION GAP SERPL CALCULATED.3IONS-SCNC: 13 MMOL/L (ref 7–16)
BUN SERPL-MCNC: 36 MG/DL (ref 6–23)
CALCIUM SERPL-MCNC: 9 MG/DL (ref 8.6–10.2)
CHLORIDE SERPL-SCNC: 97 MMOL/L (ref 98–107)
CO2 SERPL-SCNC: 30 MMOL/L (ref 22–29)
CREAT SERPL-MCNC: 4.7 MG/DL (ref 0.5–1)
ERYTHROCYTE [DISTWIDTH] IN BLOOD BY AUTOMATED COUNT: 13.8 % (ref 11.5–15)
GFR SERPL CREATININE-BSD FRML MDRD: 9 ML/MIN/1.73M2
GLUCOSE BLD-MCNC: 144 MG/DL (ref 74–99)
GLUCOSE SERPL-MCNC: 157 MG/DL (ref 74–99)
HCT VFR BLD AUTO: 23.3 % (ref 34–48)
HGB BLD-MCNC: 7.3 G/DL (ref 11.5–15.5)
MAGNESIUM SERPL-MCNC: 2.3 MG/DL (ref 1.6–2.6)
MCH RBC QN AUTO: 32.9 PG (ref 26–35)
MCHC RBC AUTO-ENTMCNC: 31.3 G/DL (ref 32–34.5)
MCV RBC AUTO: 105 FL (ref 80–99.9)
PHOSPHATE SERPL-MCNC: 4.4 MG/DL (ref 2.5–4.5)
PLATELET # BLD AUTO: 170 K/UL (ref 130–450)
PMV BLD AUTO: 10.5 FL (ref 7–12)
POTASSIUM SERPL-SCNC: 4 MMOL/L (ref 3.5–5)
RBC # BLD AUTO: 2.22 M/UL (ref 3.5–5.5)
SODIUM SERPL-SCNC: 140 MMOL/L (ref 132–146)
WBC OTHER # BLD: 12 K/UL (ref 4.5–11.5)

## 2023-11-01 PROCEDURE — 85027 COMPLETE CBC AUTOMATED: CPT

## 2023-11-01 PROCEDURE — 82962 GLUCOSE BLOOD TEST: CPT

## 2023-11-01 PROCEDURE — 6370000000 HC RX 637 (ALT 250 FOR IP): Performed by: FAMILY MEDICINE

## 2023-11-01 PROCEDURE — 97535 SELF CARE MNGMENT TRAINING: CPT

## 2023-11-01 PROCEDURE — 80048 BASIC METABOLIC PNL TOTAL CA: CPT

## 2023-11-01 PROCEDURE — 99238 HOSP IP/OBS DSCHRG MGMT 30/<: CPT | Performed by: FAMILY MEDICINE

## 2023-11-01 PROCEDURE — 83735 ASSAY OF MAGNESIUM: CPT

## 2023-11-01 PROCEDURE — 97165 OT EVAL LOW COMPLEX 30 MIN: CPT

## 2023-11-01 PROCEDURE — 84100 ASSAY OF PHOSPHORUS: CPT

## 2023-11-01 PROCEDURE — 97530 THERAPEUTIC ACTIVITIES: CPT

## 2023-11-01 PROCEDURE — 36415 COLL VENOUS BLD VENIPUNCTURE: CPT

## 2023-11-01 PROCEDURE — 6360000002 HC RX W HCPCS: Performed by: FAMILY MEDICINE

## 2023-11-01 PROCEDURE — 2580000003 HC RX 258: Performed by: FAMILY MEDICINE

## 2023-11-01 PROCEDURE — 97161 PT EVAL LOW COMPLEX 20 MIN: CPT

## 2023-11-01 RX ADMIN — SEVELAMER CARBONATE 800 MG: 800 TABLET, FILM COATED ORAL at 14:06

## 2023-11-01 RX ADMIN — BUSPIRONE HYDROCHLORIDE 10 MG: 10 TABLET ORAL at 09:55

## 2023-11-01 RX ADMIN — MIDODRINE HYDROCHLORIDE 15 MG: 5 TABLET ORAL at 09:55

## 2023-11-01 RX ADMIN — OXYCODONE AND ACETAMINOPHEN 1 TABLET: 5; 325 TABLET ORAL at 09:55

## 2023-11-01 RX ADMIN — SENNOSIDES 17.2 MG: 8.6 TABLET, FILM COATED ORAL at 09:55

## 2023-11-01 RX ADMIN — OXYCODONE AND ACETAMINOPHEN 1 TABLET: 5; 325 TABLET ORAL at 03:00

## 2023-11-01 RX ADMIN — ESCITALOPRAM OXALATE 10 MG: 10 TABLET ORAL at 09:55

## 2023-11-01 RX ADMIN — METOPROLOL SUCCINATE 25 MG: 25 TABLET, EXTENDED RELEASE ORAL at 09:55

## 2023-11-01 RX ADMIN — HEPARIN SODIUM 5000 UNITS: 10000 INJECTION INTRAVENOUS; SUBCUTANEOUS at 06:30

## 2023-11-01 RX ADMIN — SODIUM CHLORIDE, PRESERVATIVE FREE 10 ML: 5 INJECTION INTRAVENOUS at 09:57

## 2023-11-01 RX ADMIN — SEVELAMER CARBONATE 800 MG: 800 TABLET, FILM COATED ORAL at 09:56

## 2023-11-01 ASSESSMENT — PAIN SCALES - GENERAL
PAINLEVEL_OUTOF10: 8
PAINLEVEL_OUTOF10: 8

## 2023-11-01 NOTE — H&P
[address], [address] [place] [address]                              HISTORY AND PHYSICAL    PATIENT NAME: Kirsten Weber                      :        1950  MED REC NO:   [id number]                            ROOM:       [id number]  ACCOUNT NO:   [de-identified]                           ADMIT DATE: 10/30/2023  PROVIDER:     Susanna Rojo MD    CHIEF COMPLAINT:  Nausea and vomiting. HISTORY OF PRESENTING ILLNESS:  A 75year-old on chronic dialysis,  chronic sacral decubiti with multiple previous admissions for altered  mental status, presents to the emergency room from the nursing home  after vomiting. The patient had an elevated white count of 17. CT scan  of the head and abdomen showed no acute changes. The patient's  potassium was 5.8, so she has undergone dialysis yesterday and also  today. Presently seen her in dialysis. Denies any abdominal pain. Does complain of some nausea. Denies any shortness of breath at this  point. RECENT PRESENT MEDICATION:  See med rec in the chart. PAST MEDICAL HISTORY:  Included hypertension, diabetes, kidney failure  on hemodialysis, chronic sacral decubiti, osteoarthritis of the knee,  chronic anemia. SOCIAL HISTORY:  Lives at the skilled nursing facility. Does not drink. Does not smoke. ALLERGIES:  None known. REVIEW OF SYSTEMS:  SKIN/LYMPHATIC:  Once again, large sacral decubiti, which is chronic. CIRCULATORY:  Presently, denies chest pain. Denies shortness of breath. DIGESTIVE:  Came in with complaints of nausea and vomiting. Complains  of nausea, but no abdominal pain presently. GENITOURINARY:  Negative. MUSCULOSKELETAL:  Chronic musculoskeletal pain. PHYSICAL EXAMINATION:  GENERAL:  The patient was seen at dialysis, appears to be in no  distress. VITAL SIGNS:  Temperature 96.4, pulse 86, respirations 16, nonlabored,  pressure 156/81, O2 sat 94% on room air.   SKIN:

## 2023-11-01 NOTE — CARE COORDINATION
CM note. D/c order noted. Plan is [facility]. Pt is a bed hold there. Will need therapy evals. No need to wait for insurance auth to return. No pasrr required. CIELO and destination updated. Ambulance form with envelope completed and place on the soft chart. Transportation set up via stretcher with [facility].  time is 1500. Nurse will need to call report to [phone]. Facility liaison, RN, and patient son Rajan Bustos notified of  time. Morteza Hoff [phone].

## 2023-11-01 NOTE — PLAN OF CARE
Problem: Skin/Tissue Integrity  Goal: Absence of new skin breakdown  Description: 1. Monitor for areas of redness and/or skin breakdown  2. Assess vascular access sites hourly  3. Every 4-6 hours minimum:  Change oxygen saturation probe site  4. Every 4-6 hours:  If on nasal continuous positive airway pressure, respiratory therapy assess nares and determine need for appliance change or resting period.   11/1/2023 1529 by Linda Handy RN  Outcome: Adequate for Discharge  11/1/2023 0234 by Elizabeth Triplett RN  Outcome: Progressing     Problem: Safety - Adult  Goal: Free from fall injury  11/1/2023 1529 by Linda Handy RN  Outcome: Adequate for Discharge  11/1/2023 0234 by Elizabeth Triplett RN  Outcome: Progressing     Problem: Pain  Goal: Verbalizes/displays adequate comfort level or baseline comfort level  11/1/2023 1529 by Linda Handy RN  Outcome: Adequate for Discharge  11/1/2023 0234 by Elizabeth Triplett RN  Outcome: Progressing     Problem: Chronic Conditions and Co-morbidities  Goal: Patient's chronic conditions and co-morbidity symptoms are monitored and maintained or improved  Outcome: Adequate for Discharge

## 2023-11-01 NOTE — PLAN OF CARE
Problem: Skin/Tissue Integrity  Goal: Absence of new skin breakdown  Description: 1. Monitor for areas of redness and/or skin breakdown  2. Assess vascular access sites hourly  3. Every 4-6 hours minimum:  Change oxygen saturation probe site  4. Every 4-6 hours:  If on nasal continuous positive airway pressure, respiratory therapy assess nares and determine need for appliance change or resting period.   11/1/2023 0234 by Drake Kruse RN  Outcome: Progressing  10/31/2023 1247 by Philly Short RN  Outcome: Progressing     Problem: Safety - Adult  Goal: Free from fall injury  11/1/2023 0234 by Drake Kruse RN  Outcome: Progressing  10/31/2023 1247 by Philly Short RN  Outcome: Progressing     Problem: Pain  Goal: Verbalizes/displays adequate comfort level or baseline comfort level  11/1/2023 0234 by Drake Kruse RN  Outcome: Progressing  10/31/2023 1247 by Philly Short RN  Outcome: Progressing

## 2023-11-01 NOTE — DISCHARGE INSTR - COC
Cleansed Irrigated with saline 11/01/23 0220   Dressing/Treatment Other (comment) 11/01/23 0220   Wound Length (cm) 2 cm 11/01/23 0220   Wound Width (cm) 1 cm 11/01/23 0220   Wound Depth (cm) 0.1 cm 11/01/23 0220   Wound Surface Area (cm^2) 2 cm^2 11/01/23 0220   Wound Volume (cm^3) 0.2 cm^3 11/01/23 0220   Wound Assessment Pink/red 11/01/23 0220   Drainage Amount Scant (moist but unmeasurable) 11/01/23 0220   Drainage Description Thin 11/01/23 0220   Odor None 11/01/23 0220   Margins Defined edges 10/31/23 1158   Number of days: 0        Elimination:  Continence: Bowel: Yes  Bladder: Yes  Urinary Catheter: None   Colostomy/Ileostomy/Ileal Conduit: No       Date of Last BM: 10/31/23    Intake/Output Summary (Last 24 hours) at 11/1/2023 0727  Last data filed at 10/31/2023 1015  Gross per 24 hour   Intake 300 ml   Output 1289 ml   Net -989 ml     I/O last 3 completed shifts: In: 300   Out: 1289     Safety Concerns: At Risk for Falls    Impairments/Disabilities:      None    Nutrition Therapy:  Current Nutrition Therapy:   - Oral Diet:  General    Routes of Feeding: Oral  Liquids: No Restrictions  Daily Fluid Restriction: no  Last Modified Barium Swallow with Video (Video Swallowing Test): not done    Treatments at the Time of Hospital Discharge:   Respiratory Treatments: ***  Oxygen Therapy:  is not on home oxygen therapy.   Ventilator:    - No ventilator support    Rehab Therapies: Physical Therapy and Occupational Therapy  Weight Bearing Status/Restrictions: No weight bearing restrictions  Other Medical Equipment (for information only, NOT a DME order):  wheelchair  Other Treatments: ***    Patient's personal belongings (please select all that are sent with patient):  None    RN SIGNATURE:  Electronically signed by Brandin Sosa RN on 11/1/23 at 3:07 PM EDT    CASE MANAGEMENT/SOCIAL WORK SECTION    Inpatient Status Date: 10/30/23    Readmission Risk Assessment Score:  Readmission Risk              Risk of

## 2023-11-02 ENCOUNTER — OUTSIDE SERVICES (OUTPATIENT)
Dept: PRIMARY CARE CLINIC | Age: 73
End: 2023-11-02
Payer: COMMERCIAL

## 2023-11-02 DIAGNOSIS — N18.6 ESRD ON HEMODIALYSIS (HCC): ICD-10-CM

## 2023-11-02 DIAGNOSIS — R26.2 DIFFICULTY WALKING: ICD-10-CM

## 2023-11-02 DIAGNOSIS — D63.1 ANEMIA IN CHRONIC KIDNEY DISEASE, UNSPECIFIED CKD STAGE: ICD-10-CM

## 2023-11-02 DIAGNOSIS — N18.9 ANEMIA IN CHRONIC KIDNEY DISEASE, UNSPECIFIED CKD STAGE: ICD-10-CM

## 2023-11-02 DIAGNOSIS — E11.65 TYPE 2 DIABETES MELLITUS WITH HYPERGLYCEMIA, WITH LONG-TERM CURRENT USE OF INSULIN (HCC): ICD-10-CM

## 2023-11-02 DIAGNOSIS — Z99.2 ESRD ON HEMODIALYSIS (HCC): ICD-10-CM

## 2023-11-02 DIAGNOSIS — M17.0 PRIMARY OSTEOARTHRITIS OF BOTH KNEES: ICD-10-CM

## 2023-11-02 DIAGNOSIS — M62.81 MUSCLE WEAKNESS (GENERALIZED): ICD-10-CM

## 2023-11-02 DIAGNOSIS — E87.5 HYPERKALEMIA: Primary | ICD-10-CM

## 2023-11-02 DIAGNOSIS — E66.8 MODERATE OBESITY: ICD-10-CM

## 2023-11-02 DIAGNOSIS — F41.1 GENERALIZED ANXIETY DISORDER: ICD-10-CM

## 2023-11-02 DIAGNOSIS — L89.154 PRESSURE INJURY OF SACRAL REGION, STAGE 4 (HCC): ICD-10-CM

## 2023-11-02 DIAGNOSIS — N28.89 HYPERTENSION SECONDARY TO OTHER RENAL DISORDERS: ICD-10-CM

## 2023-11-02 DIAGNOSIS — E78.00 PURE HYPERCHOLESTEROLEMIA: ICD-10-CM

## 2023-11-02 DIAGNOSIS — Z79.4 TYPE 2 DIABETES MELLITUS WITH HYPERGLYCEMIA, WITH LONG-TERM CURRENT USE OF INSULIN (HCC): ICD-10-CM

## 2023-11-02 DIAGNOSIS — R21 RASH OF BACK: ICD-10-CM

## 2023-11-02 DIAGNOSIS — I15.1 HYPERTENSION SECONDARY TO OTHER RENAL DISORDERS: ICD-10-CM

## 2023-11-02 LAB
25(OH)D3 SERPL-MCNC: 39.1 NG/ML (ref 30–100)
ALBUMIN SERPL-MCNC: 2.7 G/DL (ref 3.5–5.2)
ALP SERPL-CCNC: 84 U/L (ref 35–104)
ALT SERPL-CCNC: <5 U/L (ref 0–32)
ANION GAP SERPL CALCULATED.3IONS-SCNC: 14 MMOL/L (ref 7–16)
AST SERPL-CCNC: 9 U/L (ref 0–31)
BILIRUB SERPL-MCNC: 0.2 MG/DL (ref 0–1.2)
BUN SERPL-MCNC: 46 MG/DL (ref 6–23)
CALCIUM SERPL-MCNC: 9.5 MG/DL (ref 8.6–10.2)
CHLORIDE SERPL-SCNC: 93 MMOL/L (ref 98–107)
CO2 SERPL-SCNC: 27 MMOL/L (ref 22–29)
CREAT SERPL-MCNC: 6 MG/DL (ref 0.5–1)
ERYTHROCYTE [DISTWIDTH] IN BLOOD BY AUTOMATED COUNT: 13.7 % (ref 11.5–15)
GFR SERPL CREATININE-BSD FRML MDRD: 7 ML/MIN/1.73M2
GLUCOSE SERPL-MCNC: 185 MG/DL (ref 74–99)
HBA1C MFR BLD: 6.9 % (ref 4–5.6)
HCT VFR BLD AUTO: 26.7 % (ref 34–48)
HGB BLD-MCNC: 8.2 G/DL (ref 11.5–15.5)
MCH RBC QN AUTO: 32.8 PG (ref 26–35)
MCHC RBC AUTO-ENTMCNC: 30.7 G/DL (ref 32–34.5)
MCV RBC AUTO: 106.8 FL (ref 80–99.9)
PLATELET # BLD AUTO: 217 K/UL (ref 130–450)
PMV BLD AUTO: 10.7 FL (ref 7–12)
POTASSIUM SERPL-SCNC: 4.2 MMOL/L (ref 3.5–5)
PROT SERPL-MCNC: 6.5 G/DL (ref 6.4–8.3)
RBC # BLD AUTO: 2.5 M/UL (ref 3.5–5.5)
SODIUM SERPL-SCNC: 134 MMOL/L (ref 132–146)
WBC OTHER # BLD: 14.5 K/UL (ref 4.5–11.5)

## 2023-11-02 PROCEDURE — 99305 1ST NF CARE MODERATE MDM 35: CPT | Performed by: INTERNAL MEDICINE

## 2023-11-02 NOTE — PROGRESS NOTES
Visit Date: 2023  Candi Stanton (:  1950) is a 75 y.o. female. History & Physical    Subjective   Patient presents with complaint that her back still hurts due to rash that she has. SUBJECTIVE/OBJECTIVE:  HPI:  Patient was sent out to ER from nursing home due to repeated episodes of emesis during dialysis and after. Patient did not complete dialysis due to vomiting. She was admitted to the hospital for observation and treatment. She was then discharged back to the nursing home and to continue her dialysis routine. Past Medical History:   Diagnosis Date    Anemia in chronic kidney disease     Anxiety and depression     Arthritis     Chronic pain syndrome     COVID-19 2020    COVID-19     Decreased dorsalis pedis pulse 10/25/2022    Diabetes mellitus ([address])     Dysphagia, oral phase     ESRD on hemodialysis ([address]) 10/25/2022    GERD (gastroesophageal reflux disease)     Hemodialysis patient ([address])     M-W-F    Hyperkalemia     Hypertension     Hypertensive kidney disease with stage 4 chronic kidney disease (HCC)     Insomnia     Kidney stones     MDD (major depressive disorder)     Moderate protein-calorie malnutrition (HCC)     Morbid obesity (HCC)     Muscle weakness (generalized)     Obesity     Pressure injury of sacral region, stage 3 ([address]) 10/25/2022    Pressure ulcer of sacral region     Sacral pressure ulcer 2021    stage 4    Unspecified osteoarthritis, unspecified site     Weakness generalized       Past Surgical History:   Procedure Laterality Date    ABDOMEN SURGERY N/A 2020    SACRAL WOUND DEBRIDEMENT CALL   WITH TIME AVAIL AM performed by Lars Floyd MD at [facility] [address]    CHOLECYSTECTOMY  3/31/16    Laparoscopic-Dr. Taylor Jessica    CYSTOSCOPY       for kidney stones    DIALYSIS FISTULA CREATION Left 2021    INSERTION FISTULA LEFT ARM performed by Sabrina Maria MD at [facility] Right 2021    REVISION AV
1 pair

## 2023-11-04 ENCOUNTER — HOSPITAL ENCOUNTER (INPATIENT)
Age: 73
LOS: 7 days | Discharge: ANOTHER HOSPITAL | DRG: 871 | End: 2023-11-11
Attending: EMERGENCY MEDICINE | Admitting: FAMILY MEDICINE
Payer: COMMERCIAL

## 2023-11-04 ENCOUNTER — APPOINTMENT (OUTPATIENT)
Dept: GENERAL RADIOLOGY | Age: 73
DRG: 871 | End: 2023-11-04
Payer: COMMERCIAL

## 2023-11-04 ENCOUNTER — APPOINTMENT (OUTPATIENT)
Dept: CT IMAGING | Age: 73
DRG: 871 | End: 2023-11-04
Attending: EMERGENCY MEDICINE
Payer: COMMERCIAL

## 2023-11-04 DIAGNOSIS — R41.82 ALTERED MENTAL STATUS, UNSPECIFIED ALTERED MENTAL STATUS TYPE: Primary | ICD-10-CM

## 2023-11-04 DIAGNOSIS — S31.000A WOUND OF SACRAL REGION, INITIAL ENCOUNTER: ICD-10-CM

## 2023-11-04 PROBLEM — R68.89 SUSPECTED SOFT TISSUE INFECTION: Status: ACTIVE | Noted: 2023-11-04

## 2023-11-04 LAB
ALBUMIN SERPL-MCNC: 2.9 G/DL (ref 3.5–5.2)
ALP SERPL-CCNC: 89 U/L (ref 35–104)
ALT SERPL-CCNC: 5 U/L (ref 0–32)
ANION GAP SERPL CALCULATED.3IONS-SCNC: 9 MMOL/L (ref 7–16)
AST SERPL-CCNC: 10 U/L (ref 0–31)
BACTERIA URNS QL MICRO: ABNORMAL
BASOPHILS # BLD: 0.04 K/UL (ref 0–0.2)
BASOPHILS NFR BLD: 0 % (ref 0–2)
BILIRUB SERPL-MCNC: 0.3 MG/DL (ref 0–1.2)
BILIRUB UR QL STRIP: NEGATIVE
BUN SERPL-MCNC: 36 MG/DL (ref 6–23)
CALCIUM SERPL-MCNC: 10 MG/DL (ref 8.6–10.2)
CHLORIDE SERPL-SCNC: 93 MMOL/L (ref 98–107)
CLARITY UR: ABNORMAL
CO2 SERPL-SCNC: 33 MMOL/L (ref 22–29)
COLOR UR: YELLOW
CREAT SERPL-MCNC: 5 MG/DL (ref 0.5–1)
CRP SERPL HS-MCNC: 150 MG/L (ref 0–5)
EOSINOPHIL # BLD: 0.14 K/UL (ref 0.05–0.5)
EOSINOPHILS RELATIVE PERCENT: 1 % (ref 0–6)
ERYTHROCYTE [DISTWIDTH] IN BLOOD BY AUTOMATED COUNT: 14.2 % (ref 11.5–15)
ERYTHROCYTE [SEDIMENTATION RATE] IN BLOOD BY WESTERGREN METHOD: 131 MM/HR (ref 0–20)
GFR SERPL CREATININE-BSD FRML MDRD: 9 ML/MIN/1.73M2
GLUCOSE BLD-MCNC: 101 MG/DL (ref 74–99)
GLUCOSE BLD-MCNC: 106 MG/DL (ref 74–99)
GLUCOSE BLD-MCNC: 128 MG/DL (ref 74–99)
GLUCOSE BLD-MCNC: 189 MG/DL (ref 74–99)
GLUCOSE SERPL-MCNC: 118 MG/DL (ref 74–99)
GLUCOSE UR STRIP-MCNC: NEGATIVE MG/DL
HCT VFR BLD AUTO: 28.9 % (ref 34–48)
HGB BLD-MCNC: 8.8 G/DL (ref 11.5–15.5)
HGB UR QL STRIP.AUTO: ABNORMAL
IMM GRANULOCYTES # BLD AUTO: 0.15 K/UL (ref 0–0.58)
IMM GRANULOCYTES NFR BLD: 1 % (ref 0–5)
KETONES UR STRIP-MCNC: NEGATIVE MG/DL
LACTATE BLDV-SCNC: 2 MMOL/L (ref 0.5–2.2)
LEUKOCYTE ESTERASE UR QL STRIP: ABNORMAL
LYMPHOCYTES NFR BLD: 0.93 K/UL (ref 1.5–4)
LYMPHOCYTES RELATIVE PERCENT: 6 % (ref 20–42)
MAGNESIUM SERPL-MCNC: 2.4 MG/DL (ref 1.6–2.6)
MCH RBC QN AUTO: 32.5 PG (ref 26–35)
MCHC RBC AUTO-ENTMCNC: 30.4 G/DL (ref 32–34.5)
MCV RBC AUTO: 106.6 FL (ref 80–99.9)
MICROORGANISM SPEC CULT: NORMAL
MICROORGANISM SPEC CULT: NORMAL
MONOCYTES NFR BLD: 1.06 K/UL (ref 0.1–0.95)
MONOCYTES NFR BLD: 6 % (ref 2–12)
NEUTROPHILS NFR BLD: 86 % (ref 43–80)
NEUTS SEG NFR BLD: 14.22 K/UL (ref 1.8–7.3)
NITRITE UR QL STRIP: NEGATIVE
PH UR STRIP: 6.5 [PH] (ref 5–9)
PHOSPHATE SERPL-MCNC: 3.4 MG/DL (ref 2.5–4.5)
PLATELET # BLD AUTO: 248 K/UL (ref 130–450)
PMV BLD AUTO: 10.2 FL (ref 7–12)
POTASSIUM SERPL-SCNC: 4.5 MMOL/L (ref 3.5–5)
PROCALCITONIN SERPL-MCNC: 3.98 NG/ML (ref 0–0.08)
PROT SERPL-MCNC: 6.7 G/DL (ref 6.4–8.3)
PROT UR STRIP-MCNC: >=300 MG/DL
RBC # BLD AUTO: 2.71 M/UL (ref 3.5–5.5)
RBC #/AREA URNS HPF: ABNORMAL /HPF
SERVICE CMNT-IMP: NORMAL
SERVICE CMNT-IMP: NORMAL
SODIUM SERPL-SCNC: 135 MMOL/L (ref 132–146)
SODIUM UR-SCNC: 102 MMOL/L
SP GR UR STRIP: 1.02 (ref 1–1.03)
SPECIMEN DESCRIPTION: NORMAL
SPECIMEN DESCRIPTION: NORMAL
TROPONIN I SERPL HS-MCNC: 118 NG/L (ref 0–9)
TROPONIN I SERPL HS-MCNC: 126 NG/L (ref 0–9)
UROBILINOGEN UR STRIP-ACNC: 0.2 EU/DL (ref 0–1)
WBC #/AREA URNS HPF: ABNORMAL /HPF
WBC OTHER # BLD: 16.5 K/UL (ref 4.5–11.5)

## 2023-11-04 PROCEDURE — 96374 THER/PROPH/DIAG INJ IV PUSH: CPT

## 2023-11-04 PROCEDURE — 87154 CUL TYP ID BLD PTHGN 6+ TRGT: CPT

## 2023-11-04 PROCEDURE — 85652 RBC SED RATE AUTOMATED: CPT

## 2023-11-04 PROCEDURE — 87040 BLOOD CULTURE FOR BACTERIA: CPT

## 2023-11-04 PROCEDURE — 99285 EMERGENCY DEPT VISIT HI MDM: CPT

## 2023-11-04 PROCEDURE — 87086 URINE CULTURE/COLONY COUNT: CPT

## 2023-11-04 PROCEDURE — 6360000002 HC RX W HCPCS: Performed by: EMERGENCY MEDICINE

## 2023-11-04 PROCEDURE — 86140 C-REACTIVE PROTEIN: CPT

## 2023-11-04 PROCEDURE — 96376 TX/PRO/DX INJ SAME DRUG ADON: CPT

## 2023-11-04 PROCEDURE — 2580000003 HC RX 258: Performed by: FAMILY MEDICINE

## 2023-11-04 PROCEDURE — 84484 ASSAY OF TROPONIN QUANT: CPT

## 2023-11-04 PROCEDURE — 6360000002 HC RX W HCPCS: Performed by: FAMILY MEDICINE

## 2023-11-04 PROCEDURE — 70450 CT HEAD/BRAIN W/O DYE: CPT

## 2023-11-04 PROCEDURE — 2580000003 HC RX 258: Performed by: EMERGENCY MEDICINE

## 2023-11-04 PROCEDURE — 86403 PARTICLE AGGLUT ANTBDY SCRN: CPT

## 2023-11-04 PROCEDURE — 80053 COMPREHEN METABOLIC PANEL: CPT

## 2023-11-04 PROCEDURE — 84300 ASSAY OF URINE SODIUM: CPT

## 2023-11-04 PROCEDURE — 81001 URINALYSIS AUTO W/SCOPE: CPT

## 2023-11-04 PROCEDURE — 83735 ASSAY OF MAGNESIUM: CPT

## 2023-11-04 PROCEDURE — 85025 COMPLETE CBC W/AUTO DIFF WBC: CPT

## 2023-11-04 PROCEDURE — 71045 X-RAY EXAM CHEST 1 VIEW: CPT

## 2023-11-04 PROCEDURE — 93005 ELECTROCARDIOGRAM TRACING: CPT | Performed by: EMERGENCY MEDICINE

## 2023-11-04 PROCEDURE — 2140000000 HC CCU INTERMEDIATE R&B

## 2023-11-04 PROCEDURE — 84145 PROCALCITONIN (PCT): CPT

## 2023-11-04 PROCEDURE — 84100 ASSAY OF PHOSPHORUS: CPT

## 2023-11-04 PROCEDURE — 83605 ASSAY OF LACTIC ACID: CPT

## 2023-11-04 PROCEDURE — 82962 GLUCOSE BLOOD TEST: CPT

## 2023-11-04 PROCEDURE — 87077 CULTURE AEROBIC IDENTIFY: CPT

## 2023-11-04 PROCEDURE — 87070 CULTURE OTHR SPECIMN AEROBIC: CPT

## 2023-11-04 PROCEDURE — 87205 SMEAR GRAM STAIN: CPT

## 2023-11-04 RX ORDER — LORAZEPAM 2 MG/ML
0.5 INJECTION INTRAMUSCULAR ONCE
Status: COMPLETED | OUTPATIENT
Start: 2023-11-04 | End: 2023-11-04

## 2023-11-04 RX ORDER — ACETAMINOPHEN 650 MG/1
650 SUPPOSITORY RECTAL EVERY 6 HOURS PRN
Status: DISCONTINUED | OUTPATIENT
Start: 2023-11-04 | End: 2023-11-12 | Stop reason: HOSPADM

## 2023-11-04 RX ORDER — ONDANSETRON 4 MG/1
4 TABLET, FILM COATED ORAL EVERY 8 HOURS PRN
Status: DISCONTINUED | OUTPATIENT
Start: 2023-11-04 | End: 2023-11-04 | Stop reason: CLARIF

## 2023-11-04 RX ORDER — BISACODYL 10 MG
10 SUPPOSITORY, RECTAL RECTAL DAILY PRN
Status: DISCONTINUED | OUTPATIENT
Start: 2023-11-04 | End: 2023-11-12 | Stop reason: HOSPADM

## 2023-11-04 RX ORDER — INSULIN GLARGINE 100 [IU]/ML
5 INJECTION, SOLUTION SUBCUTANEOUS NIGHTLY
COMMUNITY

## 2023-11-04 RX ORDER — ESCITALOPRAM OXALATE 10 MG/1
10 TABLET ORAL DAILY
Status: DISCONTINUED | OUTPATIENT
Start: 2023-11-04 | End: 2023-11-12 | Stop reason: HOSPADM

## 2023-11-04 RX ORDER — TRAMADOL HYDROCHLORIDE 50 MG/1
50 TABLET ORAL EVERY 8 HOURS PRN
Status: ON HOLD | COMMUNITY
End: 2023-11-11 | Stop reason: HOSPADM

## 2023-11-04 RX ORDER — BACLOFEN 10 MG/1
10 TABLET ORAL 3 TIMES DAILY
Status: ON HOLD | COMMUNITY
End: 2023-11-11 | Stop reason: HOSPADM

## 2023-11-04 RX ORDER — ACETAMINOPHEN 325 MG/1
650 TABLET ORAL EVERY 4 HOURS PRN
Status: ON HOLD | COMMUNITY
End: 2023-11-11 | Stop reason: HOSPADM

## 2023-11-04 RX ORDER — INSULIN GLARGINE 100 [IU]/ML
5 INJECTION, SOLUTION SUBCUTANEOUS NIGHTLY
Status: DISCONTINUED | OUTPATIENT
Start: 2023-11-04 | End: 2023-11-12 | Stop reason: HOSPADM

## 2023-11-04 RX ORDER — METOPROLOL SUCCINATE 25 MG/1
25 TABLET, EXTENDED RELEASE ORAL DAILY
Status: DISCONTINUED | OUTPATIENT
Start: 2023-11-04 | End: 2023-11-12 | Stop reason: HOSPADM

## 2023-11-04 RX ORDER — BACLOFEN 10 MG/1
10 TABLET ORAL 3 TIMES DAILY
Status: DISCONTINUED | OUTPATIENT
Start: 2023-11-04 | End: 2023-11-12 | Stop reason: HOSPADM

## 2023-11-04 RX ORDER — POLYETHYLENE GLYCOL 3350 17 G/17G
17 POWDER, FOR SOLUTION ORAL DAILY PRN
Status: DISCONTINUED | OUTPATIENT
Start: 2023-11-04 | End: 2023-11-04 | Stop reason: SDUPTHER

## 2023-11-04 RX ORDER — LACTULOSE 10 G/15ML
10 SOLUTION ORAL DAILY PRN
COMMUNITY

## 2023-11-04 RX ORDER — BUSPIRONE HYDROCHLORIDE 5 MG/1
10 TABLET ORAL DAILY
Status: DISCONTINUED | OUTPATIENT
Start: 2023-11-04 | End: 2023-11-12 | Stop reason: HOSPADM

## 2023-11-04 RX ORDER — DEXTROSE MONOHYDRATE 100 MG/ML
INJECTION, SOLUTION INTRAVENOUS CONTINUOUS PRN
Status: DISCONTINUED | OUTPATIENT
Start: 2023-11-04 | End: 2023-11-12 | Stop reason: HOSPADM

## 2023-11-04 RX ORDER — ONDANSETRON 4 MG/1
4 TABLET, FILM COATED ORAL EVERY 8 HOURS PRN
Status: ON HOLD | COMMUNITY
End: 2023-11-11 | Stop reason: HOSPADM

## 2023-11-04 RX ORDER — ESCITALOPRAM OXALATE 10 MG/1
10 TABLET ORAL DAILY
COMMUNITY

## 2023-11-04 RX ORDER — POLYETHYLENE GLYCOL 3350 17 G/17G
17 POWDER, FOR SOLUTION ORAL DAILY PRN
Status: DISCONTINUED | OUTPATIENT
Start: 2023-11-04 | End: 2023-11-12 | Stop reason: HOSPADM

## 2023-11-04 RX ORDER — SODIUM CHLORIDE 0.9 % (FLUSH) 0.9 %
5-40 SYRINGE (ML) INJECTION EVERY 12 HOURS SCHEDULED
Status: DISCONTINUED | OUTPATIENT
Start: 2023-11-04 | End: 2023-11-12 | Stop reason: HOSPADM

## 2023-11-04 RX ORDER — ACETAMINOPHEN 325 MG/1
650 TABLET ORAL EVERY 6 HOURS PRN
Status: DISCONTINUED | OUTPATIENT
Start: 2023-11-04 | End: 2023-11-12 | Stop reason: HOSPADM

## 2023-11-04 RX ORDER — SENNOSIDES A AND B 8.6 MG/1
2 TABLET, FILM COATED ORAL 2 TIMES DAILY
Status: DISCONTINUED | OUTPATIENT
Start: 2023-11-04 | End: 2023-11-12 | Stop reason: HOSPADM

## 2023-11-04 RX ORDER — MIDODRINE HYDROCHLORIDE 5 MG/1
15 TABLET ORAL
Status: DISCONTINUED | OUTPATIENT
Start: 2023-11-06 | End: 2023-11-12 | Stop reason: HOSPADM

## 2023-11-04 RX ORDER — INSULIN LISPRO 100 [IU]/ML
0-4 INJECTION, SOLUTION INTRAVENOUS; SUBCUTANEOUS NIGHTLY
Status: DISCONTINUED | OUTPATIENT
Start: 2023-11-04 | End: 2023-11-12 | Stop reason: HOSPADM

## 2023-11-04 RX ORDER — GABAPENTIN 100 MG/1
100 CAPSULE ORAL 3 TIMES DAILY
Status: DISCONTINUED | OUTPATIENT
Start: 2023-11-04 | End: 2023-11-12 | Stop reason: HOSPADM

## 2023-11-04 RX ORDER — INSULIN LISPRO 100 [IU]/ML
0-8 INJECTION, SOLUTION INTRAVENOUS; SUBCUTANEOUS
Status: DISCONTINUED | OUTPATIENT
Start: 2023-11-04 | End: 2023-11-12 | Stop reason: HOSPADM

## 2023-11-04 RX ORDER — 0.9 % SODIUM CHLORIDE 0.9 %
500 INTRAVENOUS SOLUTION INTRAVENOUS ONCE
Status: COMPLETED | OUTPATIENT
Start: 2023-11-04 | End: 2023-11-04

## 2023-11-04 RX ORDER — PROCHLORPERAZINE MALEATE 10 MG
10 TABLET ORAL EVERY 6 HOURS PRN
Status: DISCONTINUED | OUTPATIENT
Start: 2023-11-04 | End: 2023-11-12 | Stop reason: HOSPADM

## 2023-11-04 RX ORDER — CALCIUM CARBONATE 500 MG/1
1 TABLET, CHEWABLE ORAL
Status: DISCONTINUED | OUTPATIENT
Start: 2023-11-04 | End: 2023-11-12 | Stop reason: HOSPADM

## 2023-11-04 RX ORDER — OXYCODONE HYDROCHLORIDE AND ACETAMINOPHEN 5; 325 MG/1; MG/1
1 TABLET ORAL EVERY 6 HOURS PRN
Status: DISCONTINUED | OUTPATIENT
Start: 2023-11-04 | End: 2023-11-12 | Stop reason: HOSPADM

## 2023-11-04 RX ORDER — CALCIUM CARBONATE 500 MG/1
1 TABLET, CHEWABLE ORAL
Status: ON HOLD | COMMUNITY
End: 2023-11-11 | Stop reason: HOSPADM

## 2023-11-04 RX ORDER — NALOXONE HYDROCHLORIDE 0.4 MG/ML
0.4 INJECTION, SOLUTION INTRAMUSCULAR; INTRAVENOUS; SUBCUTANEOUS PRN
Status: DISCONTINUED | OUTPATIENT
Start: 2023-11-04 | End: 2023-11-12 | Stop reason: HOSPADM

## 2023-11-04 RX ORDER — MIDODRINE HYDROCHLORIDE 10 MG/1
15 TABLET ORAL ONCE
Status: DISCONTINUED | OUTPATIENT
Start: 2023-11-04 | End: 2023-11-12 | Stop reason: HOSPADM

## 2023-11-04 RX ORDER — SODIUM CHLORIDE 9 MG/ML
INJECTION, SOLUTION INTRAVENOUS PRN
Status: DISCONTINUED | OUTPATIENT
Start: 2023-11-04 | End: 2023-11-12 | Stop reason: HOSPADM

## 2023-11-04 RX ORDER — LACTULOSE 10 G/15ML
10 SOLUTION ORAL DAILY PRN
Status: DISCONTINUED | OUTPATIENT
Start: 2023-11-04 | End: 2023-11-12 | Stop reason: HOSPADM

## 2023-11-04 RX ORDER — SODIUM CHLORIDE 0.9 % (FLUSH) 0.9 %
5-40 SYRINGE (ML) INJECTION PRN
Status: DISCONTINUED | OUTPATIENT
Start: 2023-11-04 | End: 2023-11-12 | Stop reason: HOSPADM

## 2023-11-04 RX ADMIN — SODIUM CHLORIDE, PRESERVATIVE FREE 10 ML: 5 INJECTION INTRAVENOUS at 23:49

## 2023-11-04 RX ADMIN — SODIUM CHLORIDE 500 ML: 9 INJECTION, SOLUTION INTRAVENOUS at 09:57

## 2023-11-04 RX ADMIN — LORAZEPAM 0.5 MG: 2 INJECTION INTRAMUSCULAR; INTRAVENOUS at 10:17

## 2023-11-04 RX ADMIN — CEFTRIAXONE SODIUM 1000 MG: 1 INJECTION, POWDER, FOR SOLUTION INTRAMUSCULAR; INTRAVENOUS at 23:47

## 2023-11-04 RX ADMIN — NALOXONE HYDROCHLORIDE 0.4 MG: 0.4 INJECTION, SOLUTION INTRAMUSCULAR; INTRAVENOUS; SUBCUTANEOUS at 17:58

## 2023-11-04 RX ADMIN — LORAZEPAM 0.5 MG: 2 INJECTION INTRAMUSCULAR; INTRAVENOUS at 12:19

## 2023-11-04 ASSESSMENT — PAIN SCALES - GENERAL: PAINLEVEL_OUTOF10: 0

## 2023-11-04 NOTE — ED PROVIDER NOTES
Name: Guanaco Tay    MRN: [id number]     Date / Time Roomed:  11/4/2023  9:11 AM  ED Bed Assignment:  19/19    ------------------ History of Present Illness --------------------  11/4/23, Time: 9:21 AM EDT   Chief Complaint   Patient presents with    Altered Mental Status     Pt sent in from NH for unresponsive. Pt LKW 0600 per NH. EMS reports left gaze with unequal pupils.       HPI    Guanaco Tay is a 75 y.o. female, with hx of chronic pain syndrome, MDD, morbid obesity, ESRD on dialysis, anxiety, depression, diabetes, sacral pressure sore, DNR CCA, who presents to the ED today for altered mental status, coming from [facility] by EMS. EMS was initially called out due to unresponsiveness however on their arrival, patient was responsive and yelling out, has been repeating that her Romana Mandril are hurting\" over and over again, not answering questions appropriately, patient is alert however not oriented and review of systems felt unreliable at this time. The pt denies other ROS at this time.      PCP: Debbie Rust MD.    -------------------- PMH --------------------    Past Medical History:   has a past medical history of Anemia in chronic kidney disease, Anxiety and depression, Arthritis, Chronic pain syndrome, COVID-19 (05/2020), COVID-19, Decreased dorsalis pedis pulse (10/25/2022), Diabetes mellitus ([address]), Dysphagia, oral phase, ESRD on hemodialysis ([address]) (10/25/2022), GERD (gastroesophageal reflux disease), Hemodialysis patient ([address]), Hyperkalemia, Hypertension, Hypertensive kidney disease with stage 4 chronic kidney disease ([address]), Insomnia, Kidney stones, MDD (major depressive disorder), Moderate protein-calorie malnutrition ([address]), Morbid obesity ([address]), Muscle weakness (generalized), Obesity, Pressure injury of sacral region, stage 3 ([address]) (10/25/2022), Pressure ulcer of sacral region, Sacral pressure ulcer (08/03/2021), Unspecified osteoarthritis, unspecified site, and Weakness

## 2023-11-04 NOTE — CONSULTS
Palliative Care Department  [phone]  Palliative Care Initial Consult  Provider Schuyler Nguyen, [facility] - [address] [facility]  [id number]  Hospital Day: 1  Date of Initial Consult: 11/4/2023  Referring Provider: Judd Pedroza MD  Palliative Medicine was consulted for assistance with: Goals of care    HPI:   Teja Lewis is a 75 y. o. with a medical history of chronic pain syndrome, MDD, ESRD on HD and sacral pressure sore who was admitted on 11/4/2023 from SNF with a CHIEF COMPLAINT of altered mental status. CT head was negative for acute process. Chest x-ray negative. Patient has large sacral decubitus ulcer with some purulent drainage. Patient was admitted for further medical management and palliative medicine was consulted for goals of care. ASSESSMENT/PLAN:     Pertinent Hospital Diagnoses     Suspect infected sacral pressure wound  ESRD on HD    Palliative Care Encounter / Counseling Regarding Goals of Care  Please see detailed goals of care discussion as below  At this time, Teja Lewis, Does Not have capacity for medical decision-making. Capacity is time limited and situation/question specific  During encounter Génesis Browne was surrogate medical decision-maker  Outcome of goals of care meeting:   Change code status to limited- DNR-CCA/DNI  Code status Limited DNR-CCA/DNI  Advanced Directives: POA  in epic  Surrogate/Legal NOK:  Ash Yuan (daughter/POA) [phone]  Leonel Mcwilliams (child) [phone]  Josey Lopez (child) [phone]    Spiritual assessment: no spiritual distress identified  Bereavement and grief: to be determined  Referrals to: none today  SUBJECTIVE:     Current medical issues leading to Palliative Medicine involvement include   Active Hospital Problems    Diagnosis Date Noted    Altered mental status, unspecified [R41.82] 11/04/2023    Suspected soft tissue infection [R68.89] 11/04/2023       Details of Conversation:    Chart reviewed. Update received from nursing.  Patient seen

## 2023-11-04 NOTE — ED NOTES
Called report to [facility] for pt to transfer to [facility].      Mickey Whitfield RN  11/04/23 4969

## 2023-11-04 NOTE — PROGRESS NOTES
Received pt from ED. Pt only responsive to pain stimuli. BP found to be 85/22, temp 96.2, heart rate 113, SPO2 at 99% room air. Dr. Dana Courtney notified, and narcan ordered/administered. Palliative called to contact family of possible change to hospice care. Pt screaming out, and then non-responsive.  Continuing to monitor while awaiting orders

## 2023-11-05 VITALS
HEART RATE: 76 BPM | RESPIRATION RATE: 20 BRPM | BODY MASS INDEX: 33.2 KG/M2 | TEMPERATURE: 98.2 F | SYSTOLIC BLOOD PRESSURE: 112 MMHG | WEIGHT: 170 LBS | OXYGEN SATURATION: 96 % | DIASTOLIC BLOOD PRESSURE: 62 MMHG

## 2023-11-05 LAB
ALBUMIN SERPL-MCNC: 2.4 G/DL (ref 3.5–5.2)
ALP SERPL-CCNC: 83 U/L (ref 35–104)
ALT SERPL-CCNC: 6 U/L (ref 0–32)
AMMONIA PLAS-SCNC: 25 UMOL/L (ref 11–51)
ANION GAP SERPL CALCULATED.3IONS-SCNC: 19 MMOL/L (ref 7–16)
AST SERPL-CCNC: 9 U/L (ref 0–31)
BASOPHILS # BLD: 0.02 K/UL (ref 0–0.2)
BASOPHILS NFR BLD: 0 % (ref 0–2)
BILIRUB SERPL-MCNC: 0.3 MG/DL (ref 0–1.2)
BUN SERPL-MCNC: 45 MG/DL (ref 6–23)
CALCIUM SERPL-MCNC: 9.5 MG/DL (ref 8.6–10.2)
CHLORIDE SERPL-SCNC: 95 MMOL/L (ref 98–107)
CO2 SERPL-SCNC: 23 MMOL/L (ref 22–29)
CREAT SERPL-MCNC: 5.9 MG/DL (ref 0.5–1)
EOSINOPHIL # BLD: 0.11 K/UL (ref 0.05–0.5)
EOSINOPHILS RELATIVE PERCENT: 1 % (ref 0–6)
ERYTHROCYTE [DISTWIDTH] IN BLOOD BY AUTOMATED COUNT: 14.5 % (ref 11.5–15)
GFR SERPL CREATININE-BSD FRML MDRD: 7 ML/MIN/1.73M2
GLUCOSE BLD-MCNC: 129 MG/DL (ref 74–99)
GLUCOSE BLD-MCNC: 149 MG/DL (ref 74–99)
GLUCOSE BLD-MCNC: 152 MG/DL (ref 74–99)
GLUCOSE SERPL-MCNC: 113 MG/DL (ref 74–99)
HCT VFR BLD AUTO: 26.5 % (ref 34–48)
HGB BLD-MCNC: 8.1 G/DL (ref 11.5–15.5)
IMM GRANULOCYTES # BLD AUTO: 0.1 K/UL (ref 0–0.58)
IMM GRANULOCYTES NFR BLD: 1 % (ref 0–5)
LYMPHOCYTES NFR BLD: 0.84 K/UL (ref 1.5–4)
LYMPHOCYTES RELATIVE PERCENT: 6 % (ref 20–42)
MCH RBC QN AUTO: 32.5 PG (ref 26–35)
MCHC RBC AUTO-ENTMCNC: 30.6 G/DL (ref 32–34.5)
MCV RBC AUTO: 106.4 FL (ref 80–99.9)
MONOCYTES NFR BLD: 0.81 K/UL (ref 0.1–0.95)
MONOCYTES NFR BLD: 6 % (ref 2–12)
NEUTROPHILS NFR BLD: 87 % (ref 43–80)
NEUTS SEG NFR BLD: 12.45 K/UL (ref 1.8–7.3)
PLATELET # BLD AUTO: 243 K/UL (ref 130–450)
PMV BLD AUTO: 10.3 FL (ref 7–12)
POTASSIUM SERPL-SCNC: 4.6 MMOL/L (ref 3.5–5)
PROT SERPL-MCNC: 6.3 G/DL (ref 6.4–8.3)
RBC # BLD AUTO: 2.49 M/UL (ref 3.5–5.5)
SODIUM SERPL-SCNC: 137 MMOL/L (ref 132–146)
WBC OTHER # BLD: 14.3 K/UL (ref 4.5–11.5)

## 2023-11-05 PROCEDURE — 2140000000 HC CCU INTERMEDIATE R&B

## 2023-11-05 PROCEDURE — 85025 COMPLETE CBC W/AUTO DIFF WBC: CPT

## 2023-11-05 PROCEDURE — 80053 COMPREHEN METABOLIC PANEL: CPT

## 2023-11-05 PROCEDURE — 36415 COLL VENOUS BLD VENIPUNCTURE: CPT

## 2023-11-05 PROCEDURE — 6360000002 HC RX W HCPCS: Performed by: INTERNAL MEDICINE

## 2023-11-05 PROCEDURE — 99223 1ST HOSP IP/OBS HIGH 75: CPT | Performed by: FAMILY MEDICINE

## 2023-11-05 PROCEDURE — 2580000003 HC RX 258: Performed by: FAMILY MEDICINE

## 2023-11-05 PROCEDURE — 2580000003 HC RX 258: Performed by: INTERNAL MEDICINE

## 2023-11-05 PROCEDURE — 82962 GLUCOSE BLOOD TEST: CPT

## 2023-11-05 PROCEDURE — 99222 1ST HOSP IP/OBS MODERATE 55: CPT | Performed by: NURSE PRACTITIONER

## 2023-11-05 PROCEDURE — 82140 ASSAY OF AMMONIA: CPT

## 2023-11-05 RX ORDER — LORAZEPAM 0.5 MG/1
0.5 TABLET ORAL EVERY 6 HOURS PRN
Status: DISCONTINUED | OUTPATIENT
Start: 2023-11-05 | End: 2023-11-12 | Stop reason: HOSPADM

## 2023-11-05 RX ADMIN — Medication 10 ML: at 22:28

## 2023-11-05 RX ADMIN — Medication 10 ML: at 00:59

## 2023-11-05 RX ADMIN — PIPERACILLIN AND TAZOBACTAM 4500 MG: 4; .5 INJECTION, POWDER, LYOPHILIZED, FOR SOLUTION INTRAVENOUS at 22:26

## 2023-11-05 ASSESSMENT — PAIN SCALES - WONG BAKER
WONGBAKER_NUMERICALRESPONSE: 0
WONGBAKER_NUMERICALRESPONSE: 0

## 2023-11-05 ASSESSMENT — ENCOUNTER SYMPTOMS
ABDOMINAL PAIN: 0
VOMITING: 0
SHORTNESS OF BREATH: 0
DIARRHEA: 0
NAUSEA: 0

## 2023-11-05 ASSESSMENT — PAIN SCALES - GENERAL
PAINLEVEL_OUTOF10: 0
PAINLEVEL_OUTOF10: 2

## 2023-11-05 NOTE — PLAN OF CARE
Problem: Chronic Conditions and Co-morbidities  Goal: Patient's chronic conditions and co-morbidity symptoms are monitored and maintained or improved  Outcome: Not Progressing  Flowsheets (Taken 11/4/2023 2030)  Care Plan - Patient's Chronic Conditions and Co-Morbidity Symptoms are Monitored and Maintained or Improved: Monitor and assess patient's chronic conditions and comorbid symptoms for stability, deterioration, or improvement     Problem: Chronic Conditions and Co-morbidities  Goal: Patient's chronic conditions and co-morbidity symptoms are monitored and maintained or improved  Outcome: Not Progressing  Flowsheets (Taken 11/4/2023 2030)  Care Plan - Patient's Chronic Conditions and Co-Morbidity Symptoms are Monitored and Maintained or Improved: Monitor and assess patient's chronic conditions and comorbid symptoms for stability, deterioration, or improvement     Problem: Discharge Planning  Goal: Discharge to home or other facility with appropriate resources  Outcome: Not Progressing  Flowsheets (Taken 11/4/2023 2030)  Discharge to home or other facility with appropriate resources: Identify barriers to discharge with patient and caregiver

## 2023-11-05 NOTE — CONSULTS
Comprehensive Nutrition Assessment    Type and Reason for Visit:  Initial, Wound, Consult    Nutrition Recommendations/Plan:   Continue current diet ; monitor K/phos  Start binh BID and glucerna BID to optimize wound healing and aid in wound healing  Will monitor     Malnutrition Assessment:  Malnutrition Status: Moderate malnutrition (11/05/23 1314)    Context:  Chronic Illness     Findings of the 6 clinical characteristics of malnutrition:  Energy Intake:  75% or less estimated energy requirements for 1 month or longer  Weight Loss:  Unable to assess     Body Fat Loss:  Mild body fat loss Orbital   Muscle Mass Loss:  Mild muscle mass loss Temples (temporalis), Clavicles (pectoralis & deltoids)  Fluid Accumulation:  No significant fluid accumulation     Strength:  Not Performed    Nutrition Assessment:    pt adm d/t AMS/unresponsive at NH; PMhx of DM, ESRD on HD; pt w/ suspect sacral wound infection ; sepsis noted; will start ONS to aid in wound healing and monitor. Nutrition Related Findings:    K/phos WNL; on HD; responds to pain; disoriented x 4; poor dentition; active BS; +2 edema Wound Type: Unstageable, Wound Consult Pending       Current Nutrition Intake & Therapies:    Average Meal Intake: Unable to assess (d/t lack of intakes recorded per EMR)  Average Supplements Intake: None Ordered  ADULT DIET; Regular  ADULT ORAL NUTRITION SUPPLEMENT; Breakfast, Lunch; Wound Healing Oral Supplement  ADULT ORAL NUTRITION SUPPLEMENT; Lunch, Dinner; Diabetic Oral Supplement    Anthropometric Measures:  Height: 154.9 cm (5' 0.98\")  Ideal Body Weight (IBW): 105 lbs (48 kg)       Current Body Weight: 79.2 kg (174 lb 9.7 oz) (10/31-BS; recent measured wt), 166.3 % IBW.  Weight Source: Bed Scale  Current BMI (kg/m2): 33  Usual Body Weight: 83.4 kg (183 lb 13.8 oz) (3/17/23-BS)  % Weight Change (Calculated): -5  Weight Adjustment For: No Adjustment                 BMI Categories: Obese Class 1 (BMI

## 2023-11-05 NOTE — H&P
[address], [address] [place] [address]                              HISTORY AND PHYSICAL    PATIENT NAME: Asif Swain                      :        1950  MED REC NO:   [id number]                            ROOM:       65  ACCOUNT NO:   [de-identified]                           ADMIT DATE: 2023  PROVIDER:     Elaine Jones MD    CHIEF COMPLAINT:  Altered mental status, possible sepsis, infected  chronic sacral decubitus. HISTORY OF PRESENT ILLNESS:  A 65-year-old who has had multiple  admissions for altered mental status, also a large chronic sacral  decubitus that has been treated for infections in the past.  She is on  chronic hemodialysis, who was just admitted and discharged, I believe,  last week. At that time, she was admitted initially for nausea and  vomiting x1 which resolved. Blood cultures at that point were  unremarkable. The patient was stable and was discharged back to the  nursing home. On the date of this admission, the patient became more  confused and then obtunded and was sent to the emergency room for  further evaluation. Noncontrast CT was negative. Chest x-ray did not  show much. However, at this point, the sacral decubitus appeared to  possibly be infected as did her urine. The patient received  piperacillin and I believe vancomycin in the ER and is now on Rocephin. Blood cultures no growth, but were just drawn yesterday. Urine culture  is pending. However, the patient continues to be very somnolent,  occasionally was screaming out according to the family. At this point,  her vital signs, temperature, O2 sats and her pressure appear to be  stable. She was even given Narcan yesterday with no improvement. RECENT PRESENT MEDICATIONS:  See med rec in the chart.     PAST MEDICAL HISTORY:  Also included diabetes, previous closed fracture  of the right femur, chronic sacral decubitus present on admission as  stated above, obesity, osteoarthritis, remote history of sarcoidosis  also. SOCIAL HISTORY:  She has been residing at skilled nursing facility for  quite some time. Does not smoke, does not drink. FAMILY MEDICAL HISTORY:  Noncontributory at this point. ALLERGIES:  No allergies. REVIEW OF SYSTEMS:  Otherwise, unable to obtain an HPI and what we  gather from the family, the patient was awakened probably at her  baseline several days ago according to her daughter. PHYSICAL EXAMINATION:  GENERAL:  The patient lying in bed. She is in no distress. VITAL SIGNS:  Temperature 98.9, pulse 88, respirations 18 unlabored,  blood pressure 121/98, and O2 sat is 97% on room air. She is somewhat  arousable to sternal rub, but quite somnolent. CHEST:  Chest sounds quiet anteriorly. HEART:  Distant but regular. ABDOMEN:  Soft. EXTREMITIES:  Lower extremities reveal no edema. LABORATORY DATA: Urine is milky white in appearance at this point. Once  again, routine noncontrast CT yesterday was negative. Her chest x-ray  yesterday showed cardiomegaly with improved visualization of the left  lateral costophrenic sulcus compared to last week. No focal  consolidations or significant effusions were evident. Her white count  yesterday was 16.5, now 14.3; hemoglobin is 8.1, left shift is evident. Blood sugar this morning is 152, ammonia level was ordered in the ER,  but I do not see it. This will be repeated. Her sedimentation rate was  131. Her procalcitonin level was also elevated at 3.98. DIAGNOSTIC IMPRESSION:  A 75year-old with multiple comorbidities and  multiple admissions presents with altered mental status with possible  evidence of bacteremia or sepsis possibly from the urine or sacral  decubitus with an elevated procalcitonin and sedimentation rate. PLAN:  Once again, continue antibiotics. ID has been consulted. Nephrology has been consulted.   We will ask Neurology to take a

## 2023-11-05 NOTE — PROGRESS NOTES
Dr Sandra Carver notified via perfect serve, new orders given.  spoke with daughter Ayanna Rodrigues in [facility].

## 2023-11-06 ENCOUNTER — APPOINTMENT (OUTPATIENT)
Dept: GENERAL RADIOLOGY | Age: 73
DRG: 871 | End: 2023-11-06
Payer: COMMERCIAL

## 2023-11-06 LAB
ANION GAP SERPL CALCULATED.3IONS-SCNC: 16 MMOL/L (ref 7–16)
BUN SERPL-MCNC: 57 MG/DL (ref 6–23)
CALCIUM SERPL-MCNC: 8.9 MG/DL (ref 8.6–10.2)
CHLORIDE SERPL-SCNC: 96 MMOL/L (ref 98–107)
CO2 SERPL-SCNC: 23 MMOL/L (ref 22–29)
CREAT SERPL-MCNC: 7 MG/DL (ref 0.5–1)
EKG ATRIAL RATE: 71 BPM
EKG P AXIS: 77 DEGREES
EKG P-R INTERVAL: 280 MS
EKG Q-T INTERVAL: 512 MS
EKG QRS DURATION: 158 MS
EKG QTC CALCULATION (BAZETT): 556 MS
EKG R AXIS: -29 DEGREES
EKG T AXIS: 97 DEGREES
EKG VENTRICULAR RATE: 71 BPM
ERYTHROCYTE [DISTWIDTH] IN BLOOD BY AUTOMATED COUNT: 14.5 % (ref 11.5–15)
GFR SERPL CREATININE-BSD FRML MDRD: 6 ML/MIN/1.73M2
GLUCOSE BLD-MCNC: 145 MG/DL (ref 74–99)
GLUCOSE BLD-MCNC: 151 MG/DL (ref 74–99)
GLUCOSE BLD-MCNC: 161 MG/DL (ref 74–99)
GLUCOSE BLD-MCNC: 187 MG/DL (ref 74–99)
GLUCOSE SERPL-MCNC: 152 MG/DL (ref 74–99)
HCT VFR BLD AUTO: 22 % (ref 34–48)
HGB BLD-MCNC: 7.5 G/DL (ref 11.5–15.5)
MCH RBC QN AUTO: 35.9 PG (ref 26–35)
MCHC RBC AUTO-ENTMCNC: 34.1 G/DL (ref 32–34.5)
MCV RBC AUTO: 105.3 FL (ref 80–99.9)
MICROORGANISM SPEC CULT: ABNORMAL
PLATELET # BLD AUTO: 336 K/UL (ref 130–450)
PMV BLD AUTO: 10.2 FL (ref 7–12)
POTASSIUM SERPL-SCNC: 5.1 MMOL/L (ref 3.5–5)
RBC # BLD AUTO: 2.09 M/UL (ref 3.5–5.5)
SODIUM SERPL-SCNC: 135 MMOL/L (ref 132–146)
SPECIMEN DESCRIPTION: ABNORMAL
WBC OTHER # BLD: 13.3 K/UL (ref 4.5–11.5)

## 2023-11-06 PROCEDURE — 87040 BLOOD CULTURE FOR BACTERIA: CPT

## 2023-11-06 PROCEDURE — 2580000003 HC RX 258: Performed by: FAMILY MEDICINE

## 2023-11-06 PROCEDURE — 36415 COLL VENOUS BLD VENIPUNCTURE: CPT

## 2023-11-06 PROCEDURE — 93010 ELECTROCARDIOGRAM REPORT: CPT | Performed by: INTERNAL MEDICINE

## 2023-11-06 PROCEDURE — 80074 ACUTE HEPATITIS PANEL: CPT

## 2023-11-06 PROCEDURE — 30233N1 TRANSFUSION OF NONAUTOLOGOUS RED BLOOD CELLS INTO PERIPHERAL VEIN, PERCUTANEOUS APPROACH: ICD-10-PCS | Performed by: PEDIATRICS

## 2023-11-06 PROCEDURE — 6360000002 HC RX W HCPCS: Performed by: CLINICAL NURSE SPECIALIST

## 2023-11-06 PROCEDURE — 2140000000 HC CCU INTERMEDIATE R&B

## 2023-11-06 PROCEDURE — 85027 COMPLETE CBC AUTOMATED: CPT

## 2023-11-06 PROCEDURE — 51702 INSERT TEMP BLADDER CATH: CPT

## 2023-11-06 PROCEDURE — 80048 BASIC METABOLIC PNL TOTAL CA: CPT

## 2023-11-06 PROCEDURE — 99232 SBSQ HOSP IP/OBS MODERATE 35: CPT | Performed by: FAMILY MEDICINE

## 2023-11-06 PROCEDURE — 2580000003 HC RX 258: Performed by: INTERNAL MEDICINE

## 2023-11-06 PROCEDURE — 99232 SBSQ HOSP IP/OBS MODERATE 35: CPT

## 2023-11-06 PROCEDURE — 82962 GLUCOSE BLOOD TEST: CPT

## 2023-11-06 PROCEDURE — 74018 RADEX ABDOMEN 1 VIEW: CPT

## 2023-11-06 PROCEDURE — 6360000002 HC RX W HCPCS: Performed by: INTERNAL MEDICINE

## 2023-11-06 PROCEDURE — 6370000000 HC RX 637 (ALT 250 FOR IP): Performed by: FAMILY MEDICINE

## 2023-11-06 PROCEDURE — 6360000002 HC RX W HCPCS: Performed by: FAMILY MEDICINE

## 2023-11-06 RX ORDER — LORAZEPAM 2 MG/ML
1 INJECTION INTRAMUSCULAR EVERY 6 HOURS PRN
Status: DISCONTINUED | OUTPATIENT
Start: 2023-11-06 | End: 2023-11-12 | Stop reason: HOSPADM

## 2023-11-06 RX ADMIN — SENNOSIDES 17.2 MG: 8.6 TABLET, FILM COATED ORAL at 22:44

## 2023-11-06 RX ADMIN — BACLOFEN 10 MG: 10 TABLET ORAL at 22:43

## 2023-11-06 RX ADMIN — LORAZEPAM 1 MG: 2 INJECTION INTRAMUSCULAR; INTRAVENOUS at 16:12

## 2023-11-06 RX ADMIN — LORAZEPAM 1 MG: 2 INJECTION INTRAMUSCULAR; INTRAVENOUS at 10:00

## 2023-11-06 RX ADMIN — Medication 10 ML: at 22:44

## 2023-11-06 RX ADMIN — PIPERACILLIN AND TAZOBACTAM 4500 MG: 4; .5 INJECTION, POWDER, LYOPHILIZED, FOR SOLUTION INTRAVENOUS at 22:55

## 2023-11-06 RX ADMIN — EPOETIN ALFA-EPBX 6000 UNITS: 3000 INJECTION, SOLUTION INTRAVENOUS; SUBCUTANEOUS at 17:34

## 2023-11-06 RX ADMIN — PIPERACILLIN AND TAZOBACTAM 4500 MG: 4; .5 INJECTION, POWDER, LYOPHILIZED, FOR SOLUTION INTRAVENOUS at 10:08

## 2023-11-06 RX ADMIN — Medication 10 ML: at 10:01

## 2023-11-06 RX ADMIN — GABAPENTIN 100 MG: 100 CAPSULE ORAL at 22:44

## 2023-11-06 RX ADMIN — ACETAMINOPHEN 650 MG: 325 TABLET ORAL at 17:58

## 2023-11-06 ASSESSMENT — PAIN SCALES - GENERAL
PAINLEVEL_OUTOF10: 0

## 2023-11-06 ASSESSMENT — PAIN SCALES - WONG BAKER: WONGBAKER_NUMERICALRESPONSE: 0

## 2023-11-06 NOTE — DISCHARGE INSTR - COC
Continuity of Care Form    Patient Name: Giancarlo Collins   :  1950  MRN:  [id number]    Admit date:  2023  Discharge date:  ***    Code Status Order: Limited   Advance Directives:     Admitting Physician:  Lanny Higgins MD  PCP: Lanny Higgins MD    Discharging Nurse: [facility] Unit/Room#: 3373/3510-U  Discharging Unit Phone Number: ***    Emergency Contact:   Extended Emergency Contact Information  Primary Emergency Contact: Siva Mcguireie  Address: [facility], [address] [place] [address] of [address] Phone: [phone]  Mobile Phone: [phone]  Relation: Child   needed? No  Secondary Emergency Contact: MaynorJohnson  Address: [facility] of [address] Phone: [phone]  Mobile Phone: [phone]  Relation: Child   needed? No    Past Surgical History:  Past Surgical History:   Procedure Laterality Date    ABDOMEN SURGERY N/A 2020    SACRAL WOUND DEBRIDEMENT CALL   WITH TIME AVAIL AM performed by Fox Helms MD at [facility] [address]    CHOLECYSTECTOMY  3/31/16    Laparoscopic-Dr. Billie Moody       for kidney stones    DIALYSIS FISTULA CREATION Left 2021    INSERTION FISTULA LEFT ARM performed by Consuelo Wright MD at [facility] Right 2021    REVISION AV FISTULA LEFT ARM performed by Consuelo Wright MD at [address] [place] [address] Right 2022    RIGHT DISTAL FEMUR OPEN REDUCTION INTERNAL FIXATION ++SYNTHES++ performed by Dennis Lara MD at [address] [place] [address]       right     UPPER GASTROINTESTINAL ENDOSCOPY  2.18.15    Dr. Ela Jimenez Findings: Mild Gastrits and Duodenitis, 2cm Hiatal Hernia    UPPER GASTROINTESTINAL ENDOSCOPY N/A 2020    EGD CONTROL HEMORRHAGE performed by Fox Helms MD at [address] N/A 2020    EGD BEDSIDE performed by Anette Gonzalez

## 2023-11-06 NOTE — PLAN OF CARE
Problem: Chronic Conditions and Co-morbidities  Goal: Patient's chronic conditions and co-morbidity symptoms are monitored and maintained or improved  Outcome: Not Progressing  Flowsheets (Taken 11/5/2023 1920)  Care Plan - Patient's Chronic Conditions and Co-Morbidity Symptoms are Monitored and Maintained or Improved: Monitor and assess patient's chronic conditions and comorbid symptoms for stability, deterioration, or improvement     Problem: Nutrition Deficit:  Goal: Optimize nutritional status  Outcome: Not Progressing

## 2023-11-06 NOTE — PROGRESS NOTES
Occupational Therapy  OT SESSION ATTEMPT     Date:2023  Patient Name: Darnell Tello  MRN: [id number]  : 1950  Room: [address] [place] [address]     Attempted OT session this date:    [] unavailable due to other medical staff currently with pt   [] on hold per nursing staff   [] on hold per nursing staff secondary to lab / radiology results    [x] Family declined Occupational Therapy at this time, per family, pt unresponsive and unable to participate in sesison. Nursing notified. [] off unit   [] Other:     Will reattempt OT evaluation at a later time.     Camilo Carty OTR/L #[id number]

## 2023-11-06 NOTE — CARE COORDINATION
11/6:  Transition of care:  Pt presented to the ER for AMS from [address] [place] [address]. Pt is on room air at 97%, Iv Ativan prn &  Iv Zosyn. Neurology & Nephrology consulted. Pt is a ESRD M-T-Th-Fri in house den location. Cm spoke with pt's son Rajan Bustos at [phone] to discuss CM role & dc planning. Per son Rajan Bustos his dc plan is home with either him or his sister. They were not happy with things at [facility]. Pt will need set up for HD as OP. They are considering Palliative/Hospice but our not ready to make that decision. Pt was original set up with [facility] in [place] M-W-F [phone]. CM spoke with Demi Lobo will send over paper work to start process for [facility] location. If pt goes home will need Hospital bed. [address] doesn't have any preferences & declined. Cm sent referrals to [address] [place] [address]. [facility] can accept on 11/10. Sw/KAREN will continue to follow. Electronically signed by Kalin Natarajan RN on 11/6/2023 at 10:50 AM  Case Management Assessment  Initial Evaluation    Date/Time of Evaluation: 11/6/2023 12:10 PM  Assessment Completed by: Kalin Natarajan RN    If patient is discharged prior to next notation, then this note serves as note for discharge by case management. Patient Name: Buddy Benjamin                   YOB: 1950  Diagnosis: Wound of sacral region, initial encounter [S31.000A]  Suspected soft tissue infection [R68.89]  Altered mental status, unspecified altered mental status type [R41.82]  Altered mental status, unspecified [R41.82]                   Date / Time: 11/4/2023  9:11 AM    Patient Admission Status: Inpatient   Readmission Risk (Low < 19, Mod (19-27), High > 27): Readmission Risk Score: 26.1    Current PCP: Simone Evans MD  PCP verified by CM?  Yes    Chart Reviewed: Yes      History Provided by: Child/Family  Patient Orientation: Alert and Oriented, Person    Patient Cognition: Short Term Memory Deficit    Hospitalization in the last 27 achievement of predicted outcomes: Family support    Barriers to discharge: Cognitive deficit    Additional Case Management Notes: The Plan for Transition of Care is related to the following treatment goals of Wound of sacral region, initial encounter [S31.000A]  Suspected soft tissue infection [R68.89]  Altered mental status, unspecified altered mental status type [R41.82]  Altered mental status, unspecified [I85.57]    IF APPLICABLE: The Patient and/or patient representative Yusra Jimenez and her family were provided with a choice of provider and agrees with the discharge plan. Freedom of choice list with basic dialogue that supports the patient's individualized plan of care/goals and shares the quality data associated with the providers was provided to:     Patient Representative Name:       The Patient and/or Patient Representative Agree with the Discharge Plan? Franko Richter RN  Case Management Department  Ph: [phone]        The Plan for Transition of Care is related to the following treatment goals: HHC/DME    The Patient and/or patient representative  was provided with a choice of provider and agrees   with the discharge plan. [x] Yes [] No    Freedom of choice list was provided with basic dialogue that supports the patient's individualized plan of care/goals, treatment preferences and shares the quality data associated with the providers.  [x] Yes [] No

## 2023-11-06 NOTE — PROGRESS NOTES
Physical Therapy    PT consult to evaluate/treat received and appreciated. Pt chart reviewed and evaluation attempted. Pt is currently not awake/alert enough to participate in PT evaluation. Discussed with family. Will check back as able. Thank you.         Carmen He, PT, DPT   [id number]

## 2023-11-06 NOTE — PROGRESS NOTES
[facility]  Nephrology Progress Note    Patient's Name: Becca Whalen     History OF Present Illness From 11/5 Consult Note: \"The patient is a 75year old female with past medical history of anemia in CKD, ESRD on HD, DM, hyperkalemia, HTN, kidney stones, and wounds. The patient is well known to our practice as we follow her ESRD on hemodialysis at the Dialysis Den at [facility], therefore, a full consult is deferred. She was recently hospitalized at this hospital on 10/31 -11/1 for treatment of nausea and vomiting which resolved. She returned to the ED on 11/3 with altered mental status. CT of the head showed no acute intracranial process. CXR neg for pleural effusion, pneumothorax. VS in the ED:/55, P-64, R 16, T-97.5. Lab data on 11/3 includes Na 135, K 4.5, CO2 33, BUN 36, Cr 5.0, lactic  acid 2.0, calcium 10, albumin 2.9, Hgb 8.8, WBC 16.5. Per the nurse's notes the patient was unresponsive upon arrival to her room last evening. Upon assessment the patient yells out when aroused, does not follow commands or answer questions. Daughter who is a respiratory therapist present and updated. In no respiratory distress. Urinary catheter draining a milky urine. \"        Subjective: At present, patient was seen and examined. Patient is lethargic, moaning, yelling out, unable to follow simple commands. Family at bedside.      Problem List:    Patient Active Problem List   Diagnosis    Neurologic gait dysfunction    Diabetes mellitus ([address])    Hypertension    Arthritis    Knee problem    Anemia due to stage 3 chronic kidney disease    Primary osteoarthritis of both knees    Moderate protein-calorie malnutrition (HCC)    Pressure injury of sacral region, stage 4 (HCC)    Pressure injury of right hip, stage 3 (HCC)    Left bundle branch block    Moderate obesity    Failure to thrive in adult    Pressure injury of sacral region, stage 3 (HCC)    Decreased dorsalis pedis pulse    ESRD on hemodialysis ([address]) commands  Skin: no rash on exposed skin, sacral wound not examined      Data:      Recent Labs     11/04/23  0926 11/05/23 0427   WBC 16.5* 14.3*   HGB 8.8* 8.1*   HCT 28.9* 26.5*   .6* 106.4*    243     Recent Labs     11/04/23  0926 11/05/23 0427    137   K 4.5 4.6   CL 93* 95*   CO2 33* 23   BUN 36* 45*   CREATININE 5.0* 5.9*   LABGLOM 9* 7*   GLUCOSE 118* 113*   CALCIUM 10.0 9.5     Recent Labs     11/04/23  0926 11/05/23 0427   ALT 5 6   AST 10 9   ALKPHOS 89 83   BILITOT 0.3 0.3     Lab Results   Component Value Date    LABALBU 2.4 (L) 11/05/2023    LABALBU 2.9 (L) 11/04/2023    LABALBU 2.7 (L) 11/02/2023       Iron studies:  Lab Results   Component Value Date    FERRITIN 767 05/05/2021    IRON 145 09/13/2023    TIBC 116 (L) 05/05/2021     Vitamin B-12   Date Value Ref Range Status   05/05/2021 895 211 - 946 pg/mL Final     Folate   Date Value Ref Range Status   05/05/2021 >20.0 4.8 - 24.2 ng/mL Final       Bone disease:  Lab Results   Component Value Date    MG 2.4 11/04/2023    PHOS 3.4 11/04/2023     Vit D, 25-Hydroxy   Date Value Ref Range Status   11/02/2023 39.1 30.0 - 100.0 ng/mL Final     PTH   Date Value Ref Range Status   11/06/2022 466 (H) 15 - 65 pg/mL Final       No components found for: \"URIC\"    Lab Results   Component Value Date/Time    COLORU Yellow 11/04/2023 06:52 PM    NITRU NEGATIVE 11/04/2023 06:52 PM    GLUCOSEU NEGATIVE 11/04/2023 06:52 PM    GLUCOSEU NEGATIVE 08/17/2011 10:30 PM    KETUA NEGATIVE 11/04/2023 06:52 PM    UROBILINOGEN 0.2 11/04/2023 06:52 PM    BILIRUBINUR NEGATIVE 11/04/2023 06:52 PM    BILIRUBINUR NEGATIVE 08/17/2011 10:30 PM       No results found for: \"LIPIDPAN\"        Assessment and Plans:    ESRD on HD  Outpatient at the dialysis den at [facility] via left upper arm AVG. Continue HD TTS while inpatient  Next HD planned for 11/7  Monitor labs     2.  Anemia of CKD  Hemoglobin target 10-12 g/dL  Hg 8.1 11/5 --> 7.5 today  11/6 ARUN

## 2023-11-07 ENCOUNTER — APPOINTMENT (OUTPATIENT)
Dept: CT IMAGING | Age: 73
DRG: 871 | End: 2023-11-07
Payer: COMMERCIAL

## 2023-11-07 LAB
ACB COMPLEX DNA BLD POS QL NAA+NON-PROBE: NOT DETECTED
ANION GAP SERPL CALCULATED.3IONS-SCNC: 18 MMOL/L (ref 7–16)
B FRAGILIS DNA BLD POS QL NAA+NON-PROBE: NOT DETECTED
BIOFIRE TEST COMMENT: ABNORMAL
BUN SERPL-MCNC: 67 MG/DL (ref 6–23)
C ALBICANS DNA BLD POS QL NAA+NON-PROBE: NOT DETECTED
C AURIS DNA BLD POS QL NAA+NON-PROBE: NOT DETECTED
C GATTII+NEOFOR DNA BLD POS QL NAA+N-PRB: NOT DETECTED
C GLABRATA DNA BLD POS QL NAA+NON-PROBE: NOT DETECTED
C KRUSEI DNA BLD POS QL NAA+NON-PROBE: NOT DETECTED
C PARAP DNA BLD POS QL NAA+NON-PROBE: NOT DETECTED
C TROPICLS DNA BLD POS QL NAA+NON-PROBE: NOT DETECTED
CALCIUM SERPL-MCNC: 8.4 MG/DL (ref 8.6–10.2)
CHLORIDE SERPL-SCNC: 97 MMOL/L (ref 98–107)
CO2 SERPL-SCNC: 23 MMOL/L (ref 22–29)
CREAT SERPL-MCNC: 8.3 MG/DL (ref 0.5–1)
E CLOAC COMP DNA BLD POS NAA+NON-PROBE: NOT DETECTED
E COLI DNA BLD POS QL NAA+NON-PROBE: NOT DETECTED
E FAECALIS DNA BLD POS QL NAA+NON-PROBE: NOT DETECTED
E FAECIUM DNA BLD POS QL NAA+NON-PROBE: NOT DETECTED
ENTEROBACTERALES DNA BLD POS NAA+N-PRB: NOT DETECTED
ERYTHROCYTE [DISTWIDTH] IN BLOOD BY AUTOMATED COUNT: 14.9 % (ref 11.5–15)
GFR SERPL CREATININE-BSD FRML MDRD: 5 ML/MIN/1.73M2
GLUCOSE BLD-MCNC: 129 MG/DL (ref 74–99)
GLUCOSE BLD-MCNC: 181 MG/DL (ref 74–99)
GLUCOSE BLD-MCNC: 191 MG/DL (ref 74–99)
GLUCOSE SERPL-MCNC: 127 MG/DL (ref 74–99)
GP B STREP DNA BLD POS QL NAA+NON-PROBE: NOT DETECTED
HAEM INFLU DNA BLD POS QL NAA+NON-PROBE: NOT DETECTED
HAV IGM SERPL QL IA: NONREACTIVE
HBV CORE IGM SERPL QL IA: NONREACTIVE
HBV SURFACE AG SERPL QL IA: NONREACTIVE
HCT VFR BLD AUTO: 23.5 % (ref 34–48)
HCV AB SERPL QL IA: NONREACTIVE
HGB BLD-MCNC: 7.3 G/DL (ref 11.5–15.5)
K OXYTOCA DNA BLD POS QL NAA+NON-PROBE: NOT DETECTED
KLEBSIELLA SP DNA BLD POS QL NAA+NON-PRB: NOT DETECTED
KLEBSIELLA SP DNA BLD POS QL NAA+NON-PRB: NOT DETECTED
L MONOCYTOG DNA BLD POS QL NAA+NON-PROBE: NOT DETECTED
MAGNESIUM SERPL-MCNC: 2.5 MG/DL (ref 1.6–2.6)
MCH RBC QN AUTO: 32.9 PG (ref 26–35)
MCHC RBC AUTO-ENTMCNC: 31.1 G/DL (ref 32–34.5)
MCV RBC AUTO: 105.9 FL (ref 80–99.9)
MICROORGANISM SPEC CULT: ABNORMAL
MICROORGANISM/AGENT SPEC: ABNORMAL
N MEN DNA BLD POS QL NAA+NON-PROBE: NOT DETECTED
P AERUGINOSA DNA BLD POS NAA+NON-PROBE: NOT DETECTED
PHOSPHATE SERPL-MCNC: 6 MG/DL (ref 2.5–4.5)
PLATELET # BLD AUTO: 245 K/UL (ref 130–450)
PMV BLD AUTO: 10 FL (ref 7–12)
POTASSIUM SERPL-SCNC: 5.2 MMOL/L (ref 3.5–5)
PROTEUS SP DNA BLD POS QL NAA+NON-PROBE: NOT DETECTED
RBC # BLD AUTO: 2.22 M/UL (ref 3.5–5.5)
S AUREUS DNA BLD POS QL NAA+NON-PROBE: NOT DETECTED
S AUREUS+CONS DNA BLD POS NAA+NON-PROBE: DETECTED
S EPIDERMIDIS DNA BLD POS QL NAA+NON-PRB: NOT DETECTED
S LUGDUNENSIS DNA BLD POS QL NAA+NON-PRB: NOT DETECTED
S MALTOPHILIA DNA BLD POS QL NAA+NON-PRB: NOT DETECTED
S MARCESCENS DNA BLD POS NAA+NON-PROBE: NOT DETECTED
S PNEUM DNA BLD POS QL NAA+NON-PROBE: NOT DETECTED
S PYO DNA BLD POS QL NAA+NON-PROBE: NOT DETECTED
SALMONELLA DNA BLD POS QL NAA+NON-PROBE: NOT DETECTED
SERVICE CMNT-IMP: ABNORMAL
SODIUM SERPL-SCNC: 138 MMOL/L (ref 132–146)
SPECIMEN DESCRIPTION: ABNORMAL
SPECIMEN DESCRIPTION: ABNORMAL
STREPTOCOCCUS DNA BLD POS NAA+NON-PROBE: NOT DETECTED
WBC OTHER # BLD: 8 K/UL (ref 4.5–11.5)

## 2023-11-07 PROCEDURE — 80048 BASIC METABOLIC PNL TOTAL CA: CPT

## 2023-11-07 PROCEDURE — 6360000002 HC RX W HCPCS: Performed by: INTERNAL MEDICINE

## 2023-11-07 PROCEDURE — 99232 SBSQ HOSP IP/OBS MODERATE 35: CPT | Performed by: FAMILY MEDICINE

## 2023-11-07 PROCEDURE — 83735 ASSAY OF MAGNESIUM: CPT

## 2023-11-07 PROCEDURE — 6370000000 HC RX 637 (ALT 250 FOR IP): Performed by: FAMILY MEDICINE

## 2023-11-07 PROCEDURE — 2580000003 HC RX 258: Performed by: INTERNAL MEDICINE

## 2023-11-07 PROCEDURE — 74176 CT ABD & PELVIS W/O CONTRAST: CPT

## 2023-11-07 PROCEDURE — 6360000002 HC RX W HCPCS: Performed by: FAMILY MEDICINE

## 2023-11-07 PROCEDURE — 84100 ASSAY OF PHOSPHORUS: CPT

## 2023-11-07 PROCEDURE — 2580000003 HC RX 258: Performed by: FAMILY MEDICINE

## 2023-11-07 PROCEDURE — 82962 GLUCOSE BLOOD TEST: CPT

## 2023-11-07 PROCEDURE — 2140000000 HC CCU INTERMEDIATE R&B

## 2023-11-07 PROCEDURE — C9113 INJ PANTOPRAZOLE SODIUM, VIA: HCPCS | Performed by: FAMILY MEDICINE

## 2023-11-07 PROCEDURE — 85027 COMPLETE CBC AUTOMATED: CPT

## 2023-11-07 PROCEDURE — 90935 HEMODIALYSIS ONE EVALUATION: CPT

## 2023-11-07 PROCEDURE — 2500000003 HC RX 250 WO HCPCS: Performed by: FAMILY MEDICINE

## 2023-11-07 PROCEDURE — 36415 COLL VENOUS BLD VENIPUNCTURE: CPT

## 2023-11-07 PROCEDURE — A4216 STERILE WATER/SALINE, 10 ML: HCPCS | Performed by: FAMILY MEDICINE

## 2023-11-07 RX ORDER — DIVALPROEX SODIUM 125 MG/1
250 CAPSULE, COATED PELLETS ORAL EVERY 8 HOURS SCHEDULED
Status: DISCONTINUED | OUTPATIENT
Start: 2023-11-07 | End: 2023-11-12 | Stop reason: HOSPADM

## 2023-11-07 RX ADMIN — METOPROLOL SUCCINATE 25 MG: 25 TABLET, EXTENDED RELEASE ORAL at 13:13

## 2023-11-07 RX ADMIN — OXYCODONE AND ACETAMINOPHEN 1 TABLET: 5; 325 TABLET ORAL at 11:16

## 2023-11-07 RX ADMIN — BUSPIRONE HYDROCHLORIDE 10 MG: 5 TABLET ORAL at 13:13

## 2023-11-07 RX ADMIN — OXYCODONE AND ACETAMINOPHEN 1 TABLET: 5; 325 TABLET ORAL at 16:35

## 2023-11-07 RX ADMIN — PIPERACILLIN AND TAZOBACTAM 4500 MG: 4; .5 INJECTION, POWDER, LYOPHILIZED, FOR SOLUTION INTRAVENOUS at 21:26

## 2023-11-07 RX ADMIN — CALCIUM CARBONATE 500 MG: 500 TABLET, CHEWABLE ORAL at 16:35

## 2023-11-07 RX ADMIN — GABAPENTIN 100 MG: 100 CAPSULE ORAL at 13:13

## 2023-11-07 RX ADMIN — BACLOFEN 10 MG: 10 TABLET ORAL at 13:13

## 2023-11-07 RX ADMIN — MICONAZOLE NITRATE: 20.6 POWDER TOPICAL at 21:27

## 2023-11-07 RX ADMIN — Medication 10 ML: at 22:38

## 2023-11-07 RX ADMIN — PIPERACILLIN AND TAZOBACTAM 4500 MG: 4; .5 INJECTION, POWDER, LYOPHILIZED, FOR SOLUTION INTRAVENOUS at 13:28

## 2023-11-07 RX ADMIN — DIVALPROEX SODIUM 250 MG: 125 CAPSULE, COATED PELLETS ORAL at 16:35

## 2023-11-07 RX ADMIN — PETROLATUM: 420 OINTMENT TOPICAL at 22:38

## 2023-11-07 RX ADMIN — LORAZEPAM 1 MG: 2 INJECTION INTRAMUSCULAR; INTRAVENOUS at 09:12

## 2023-11-07 RX ADMIN — SODIUM CHLORIDE 40 MG: 9 INJECTION INTRAMUSCULAR; INTRAVENOUS; SUBCUTANEOUS at 22:38

## 2023-11-07 ASSESSMENT — PAIN SCALES - GENERAL
PAINLEVEL_OUTOF10: 0
PAINLEVEL_OUTOF10: 0
PAINLEVEL_OUTOF10: 4
PAINLEVEL_OUTOF10: 4

## 2023-11-07 NOTE — CONSULTS
NEUROLOGY CONSULT NOTE      Requesting Physician: Stefano Cobos MD    Reason for Consult:  Evaluate for Altered mental status. History of Present Illness:  Kira Reich is a 75 y.o. female  with h/o DM, HTN, ESRD on HD, Dysphagia, Morbid Obesity, Sacral Pressure ulceration, MDD, Generalized Weakness, MDD, and GERD  who was admitted to [facility] on 11/4/2023 with presentation of altered mental status. Patient is a resident at [facility] who become unresponsive at the facility. EMS was called, but on their arrival, patient was responsive, yelling out repetitive phrase of  \"eyes are hurting\", and  not answering questions appropriately. This was significantly different form her baseline. ED evaluation notable for possible UTI and sacral decubitus ulcer infection prompting initiation of antibiotic therapy with piperacillin and vancomycin in the ED followed by Rocephin on admission. Past Medical History:        Diagnosis Date    Anemia in chronic kidney disease     Anxiety and depression     Arthritis     Chronic pain syndrome     COVID-19 05/2020    COVID-19     Decreased dorsalis pedis pulse 10/25/2022    Diabetes mellitus ([address])     Dysphagia, oral phase     ESRD on hemodialysis ([address]) 10/25/2022    GERD (gastroesophageal reflux disease)     Hemodialysis patient ([address])     M-W-F    Hyperkalemia     Hypertension     Hypertensive kidney disease with stage 4 chronic kidney disease (HCC)     Insomnia     Kidney stones     MDD (major depressive disorder)     Moderate protein-calorie malnutrition (HCC)     Morbid obesity (HCC)     Muscle weakness (generalized)     Obesity     Pressure injury of sacral region, stage 3 ([address]) 10/25/2022    Pressure ulcer of sacral region     Sacral pressure ulcer 08/03/2021    stage 4    Unspecified osteoarthritis, unspecified site     Weakness generalized            Procedure Laterality Date    ABDOMEN SURGERY N/A 5/4/2020    SACRAL WOUND DEBRIDEMENT CALL   WITH TIME n/v, sacral wound, new onset nausea, vomtiing TECHNOLOGIST PROVIDED HISTORY: Reason for exam:->n/v, sacral wound, new onset nausea, vomtiing Additional Contrast?->None Decision Support Exception - unselect if not a suspected or confirmed emergency medical condition->Emergency Medical Condition (MA) What reading provider will be dictating this exam?->CRC FINDINGS: Redemonstration of a sacral soft tissue wound extending to level of the sacrum. There is stable osteolysis involving distal aspect of the sacrum and coccyx. No loculated fluid collections to suggest abscess. No evidence of abscess in the abdomen or pelvis. No bowel obstruction, free air, or free fluid. Numerous diverticula are associated with the left colon and sigmoid colon. There is a stable fat containing left ventral abdominal wall hernia measuring 4.8 x 8.5 cm. No bowel obstruction, free air, or free fluid. The appendix is unremarkable. No intrahepatic or extrahepatic bile duct dilatation. Evidence of cholecystectomy. Redemonstration of numerous surgical clips in upper abdomen. Calcifications are associated with the pancreas. No evidence of acute pancreatitis. Stable hypoattenuating lesion associated with posterior margin of right hepatic lobe measuring approximately 12 mm. Spleen is normal in size. Adrenal glands are unremarkable. There is atrophy of the bilateral kidneys. No hydronephrosis. Urinary bladder appears unremarkable. No evidence of pneumonia in the visualized lower lung fields. 1. Stable sacral soft tissue wound with stable osteolysis involving distal sacrum and coccyx. No evidence of soft tissue abscess. 2. Diverticulosis 3. Calcifications associated with the pancreas appearing similar compared to prior suggesting chronic pancreatitis.      CT Head W/O Contrast    Result Date: 10/30/2023  EXAMINATION: CT OF THE HEAD WITHOUT CONTRAST  10/30/2023 3:56 pm TECHNIQUE: CT of the head was performed without the

## 2023-11-07 NOTE — PROGRESS NOTES
[facility]  Nephrology Progress Note    Patient's Name: Hakan Large     History OF Present Illness From 11/5 Consult Note: \"The patient is a 75year old female with past medical history of anemia in CKD, ESRD on HD, DM, hyperkalemia, HTN, kidney stones, and wounds. The patient is well known to our practice as we follow her ESRD on hemodialysis at the Dialysis Den at [facility], therefore, a full consult is deferred. She was recently hospitalized at this hospital on 10/31 -11/1 for treatment of nausea and vomiting which resolved. She returned to the ED on 11/3 with altered mental status. CT of the head showed no acute intracranial process. CXR neg for pleural effusion, pneumothorax. VS in the ED:/55, P-64, R 16, T-97.5. Lab data on 11/3 includes Na 135, K 4.5, CO2 33, BUN 36, Cr 5.0, lactic  acid 2.0, calcium 10, albumin 2.9, Hgb 8.8, WBC 16.5. Per the nurse's notes the patient was unresponsive upon arrival to her room last evening. Upon assessment the patient yells out when aroused, does not follow commands or answer questions. Daughter who is a respiratory therapist present and updated. In no respiratory distress. Urinary catheter draining a milky urine. \"        Subjective:    11/7: At present, patient was seen and examined on HD. Patient is lethargic, moaning, yelling out, unable to follow simple commands again today.       Problem List:    Patient Active Problem List   Diagnosis    Neurologic gait dysfunction    Diabetes mellitus ([address])    Hypertension    Arthritis    Knee problem    Anemia due to stage 3 chronic kidney disease    Primary osteoarthritis of both knees    Moderate protein-calorie malnutrition (HCC)    Pressure injury of sacral region, stage 4 (HCC)    Pressure injury of right hip, stage 3 (HCC)    Left bundle branch block    Moderate obesity    Failure to thrive in adult    Pressure injury of sacral region, stage 3 (HCC)    Decreased dorsalis pedis pulse    ESRD on hemodialysis ([address]) not follows simple commands  Skin: no rash on exposed skin, sacral wound not examined      Data:      Recent Labs     11/05/23 0427 11/06/23  1015 11/07/23 0449   WBC 14.3* 13.3* 8.0   HGB 8.1* 7.5* 7.3*   HCT 26.5* 22.0* 23.5*   .4* 105.3* 105.9*    336 245     Recent Labs     11/05/23 0427 11/06/23  1015 11/07/23 0449    135 138   K 4.6 5.1* 5.2*   CL 95* 96* 97*   CO2 23 23 23   BUN 45* 57* 67*   CREATININE 5.9* 7.0* 8.3*   LABGLOM 7* 6* 5*   GLUCOSE 113* 152* 127*   CALCIUM 9.5 8.9 8.4*     Recent Labs     11/05/23 0427   ALT 6   AST 9   ALKPHOS 83   BILITOT 0.3     Lab Results   Component Value Date    LABALBU 2.4 (L) 11/05/2023    LABALBU 2.9 (L) 11/04/2023    LABALBU 2.7 (L) 11/02/2023       Iron studies:  Lab Results   Component Value Date    FERRITIN 767 05/05/2021    IRON 145 09/13/2023    TIBC 116 (L) 05/05/2021     Vitamin B-12   Date Value Ref Range Status   05/05/2021 895 211 - 946 pg/mL Final     Folate   Date Value Ref Range Status   05/05/2021 >20.0 4.8 - 24.2 ng/mL Final       Bone disease:  Lab Results   Component Value Date    MG 2.5 11/07/2023    PHOS 6.0 (H) 11/07/2023     Vit D, 25-Hydroxy   Date Value Ref Range Status   11/02/2023 39.1 30.0 - 100.0 ng/mL Final     PTH   Date Value Ref Range Status   11/06/2022 466 (H) 15 - 65 pg/mL Final       No components found for: \"URIC\"    Lab Results   Component Value Date/Time    COLORU Yellow 11/04/2023 06:52 PM    NITRU NEGATIVE 11/04/2023 06:52 PM    GLUCOSEU NEGATIVE 11/04/2023 06:52 PM    GLUCOSEU NEGATIVE 08/17/2011 10:30 PM    KETUA NEGATIVE 11/04/2023 06:52 PM    UROBILINOGEN 0.2 11/04/2023 06:52 PM    BILIRUBINUR NEGATIVE 11/04/2023 06:52 PM    BILIRUBINUR NEGATIVE 08/17/2011 10:30 PM       No results found for: \"LIPIDPAN\"        Assessment and Plans:    ESRD on HD  Outpatient at the dialysis den at [facility] via left upper arm AVG. Continue HD TTS while inpatient  HD today  Monitor labs     2.  Anemia of

## 2023-11-07 NOTE — PROGRESS NOTES
Nutrition Note    Tube feeding recommendation per physician consult. Recommend Renal (Nepro) @35ml/hr plus 1 protein modular daily to provide 840ml, 1512 calories, 68g protein, 609ml water (1616 calories and 94g protein w/ protein modular daily). See full nutrition assessment on 11/5 if needed.       Estimated Daily Nutrient Needs:  Energy Requirements Based On: Formula  Weight Used for Energy Requirements: Current  Energy (kcal/day): 4177-6211  Weight Used for Protein Requirements: Ideal  Protein (g/day): 1.8-2.2g/kgxIBW=85-105g  Method Used for Fluid Requirements: Standard Renal  Fluid (ml/day): per renal management    Electronically signed by Zaida Villalta RD, LD on 11/7/23 at 10:42 AM EST    Contact: [phone]

## 2023-11-07 NOTE — PROGRESS NOTES
Messages Dr Alejo Heads regarding pt's MEWS score of 3. She is A&Ox0 and reacts to painful stimuli.  Pupils react sluggishly d/t pt having cataracts

## 2023-11-07 NOTE — PROGRESS NOTES
Messaged Neuro- Dr. White Broaden on for pt re: that although pt is consulted w/palliative Dr. Yaima Peacock & family still requesting neuro recommendations. Messaged Dr. Yaima Peacock via [employer] re: Family requests and pt status. Dr. Yaima Peacock placed orders.

## 2023-11-07 NOTE — FLOWSHEET NOTE
Inpatient Wound Care (Initial consult) 0016L    Admit Date: 11/4/2023  9:11 AM    Reason for consult:  Sacrum    Significant history: Per H&P     CHIEF COMPLAINT:  Altered mental status, possible sepsis, infected  chronic sacral decubitus. Findings:     11/07/23 1500   Skin Integumentary    Skin Integrity   (dry flaky)   Location BLE   Skin Integrity Site 2   Skin Integrity Location 2 Excoriation   Location 2 abdominal fold, breast folds   Skin Integrity Site 3   Skin Integrity Location 3   (scattered reddened areas)    Location 3 left flank   Wound 10/31/23 Coccyx   Date First Assessed/Time First Assessed: 10/31/23 1157   Present on Original Admission: Yes  Primary Wound Type: Pressure Injury  Location: Coccyx   Wound Image    Wound Etiology Pressure Stage 4   Dressing Status New dressing applied   Wound Cleansed Cleansed with saline   Dressing/Treatment ABD; Alginate;Dry dressing   Wound Length (cm) 5 cm   Wound Width (cm) 3 cm   Wound Depth (cm) 1.1 cm   Wound Surface Area (cm^2) 15 cm^2   Change in Wound Size % (l*w) -25   Wound Volume (cm^3) 16.5 cm^3   Wound Healing % 31   Wound Assessment [place]/red;Slough   Drainage Amount Small (< 25%)   Drainage Description Serosanguinous   Odor None   Carmen-wound Assessment Dry/flaky   Wound 10/31/23 Buttocks Right   Date First Assessed/Time First Assessed: 10/31/23 1157   Present on Original Admission: Yes  Primary Wound Type: Pressure Injury  Location: Buttocks  Wound Location Orientation: Right   Wound Image    Wound Etiology Pressure Stage 3   Dressing Status New dressing applied   Wound Cleansed Cleansed with saline   Dressing/Treatment Alginate  (stratasorb)   Wound Length (cm) 4 cm   Wound Width (cm) 4 cm   Wound Depth (cm) 0.1 cm   Wound Surface Area (cm^2) 16 cm^2   Change in Wound Size % (l*w) -700   Wound Volume (cm^3) 1.6 cm^3   Wound Healing % -700   Wound Assessment Pink/red  (yellow, black)   Drainage Amount Scant (moist but unmeasurable)   Drainage Epic-Haiku Mobile Application Device. Impression:  Multiple wounds: see above documentation    Plan: Opticell, 4x4, abd pad  Antifungal powder  Aquaphor  Medix heel boots  TAPS  Low air loss module  Patient will need continued preventative care.       Ronnie Mata RN 11/7/2023 3:19 PM

## 2023-11-07 NOTE — PROGRESS NOTES
4519  Called from dialysis pt continues to holler out & not redirectable -requesting ativan. Ativan admin at bedside in dialysis. [facility] again from dialysis pt was quiet for @1 hour and then began to holler out again. Dialysis RN requesting pain medication.    Pain medication admin at bedside via NG

## 2023-11-07 NOTE — PROGRESS NOTES
Chart reviewed. No acute overnight events. NG tube placed for nutrition. Patient to continue receiving antibiotics and dialysis with hopes of improvement in mental status. Watch and wait at this time. No immediate palliative medicine needs. Family to reach out to us if they have any concerns. We will continue to follow peripherally.      Jayjay Arita, APRN - CNP  Palliative Medicine

## 2023-11-07 NOTE — PROGRESS NOTES
Occupational Therapy  OT SESSION ATTEMPT     Date:2023  Patient Name: Teja Lewis  MRN: [id number]  : 1950  Room: [address] [place] [address]     Attempted OT session this date:    [] unavailable due to other medical staff currently with pt   [] on hold per nursing staff   [] on hold per nursing staff secondary to lab / radiology results    [] declined Occupational Therapy  this date due to ___. Benefits of participation in therapy reviewed with pt. [x] off unit at dialysis    [] Other:     Will reattempt OT evaluation at a later time.     Sole Graf OTR/L #[id number]

## 2023-11-07 NOTE — CARE COORDINATION
11/7:  Update KAREN Note:  Pt presented to the ER for AMS from [address] [facility]. Pt is on room air at 95%, Iv Ativan prn &  Iv Zosyn. Pt had a NG placed on 11/6. Pt is a ESRD M-T-Th-Fri in house den location. Per pt's family they are considering Home with [address] vs Home with Hospice/Palliative depending on the next few days. Pt was active with [facility] in [place] M-W-F [phone]. CM spoke with Shawn Zhao will send over paper work to start process for [facility] location. If pt goes home will need Hospital bed, roger lift & [address] [facility],[address] doesn't have any preferences & declined list.  Cm sent referrals to [address] [place], [place] [address] [address]. [facility] can accept on 11/10. Emil/KAREN will continue to follow.   Electronically signed by Minerva Ewing RN on 11/7/2023 at 2:27 PM

## 2023-11-07 NOTE — PROGRESS NOTES
Physical Therapy    PT consult to evaluate/treat received and appreciated. Pt chart reviewed and evaluation attempted. Pt is currently on dialysis during AM attempt; confused and yelling out and unable to participate during PM attempt. RN notified. Will check back as able. Thank you.         Lexie Peter, PT, DPT   [phone]

## 2023-11-07 NOTE — PLAN OF CARE
Problem: Chronic Conditions and Co-morbidities  Goal: Patient's chronic conditions and co-morbidity symptoms are monitored and maintained or improved  11/7/2023 0307 by Dunia Bond RN  Outcome: Progressing    Problem: Discharge Planning  Goal: Discharge to home or other facility with appropriate resources  11/7/2023 0307 by Dunia Bond RN  Outcome: Progressing    Problem: Safety - Adult  Goal: Free from fall injury  11/7/2023 0307 by Dunia Bond RN  Outcome: Progressing    Problem: Pain  Goal: Verbalizes/displays adequate comfort level or baseline comfort level  11/7/2023 0307 by Dunia Bond RN  Outcome: Progressing       Problem: Skin/Tissue Integrity  Goal: Absence of new skin breakdown  Description: 1. Monitor for areas of redness and/or skin breakdown  2. Assess vascular access sites hourly  3. Every 4-6 hours minimum:  Change oxygen saturation probe site  4. Every 4-6 hours:  If on nasal continuous positive airway pressure, respiratory therapy assess nares and determine need for appliance change or resting period.   11/7/2023 0307 by Dunia Bond RN  Outcome: Progressing       Problem: Nutrition Deficit:  Goal: Optimize nutritional status  11/7/2023 0307 by Dunia Bond RN  Outcome: Progressing       Problem: Chronic Conditions and Co-morbidities  Goal: Patient's chronic conditions and co-morbidity symptoms are monitored and maintained or improved  11/7/2023 0307 by Dunia Bond RN  Outcome: Progressing       Problem: Discharge Planning  Goal: Discharge to home or other facility with appropriate resources  11/7/2023 0307 by Dunia Bond RN  Outcome: Progressing       Problem: Safety - Adult  Goal: Free from fall injury  11/7/2023 0307 by Dunia Bond RN  Outcome: Progressing       Problem: Pain  Goal: Verbalizes/displays adequate comfort level or baseline comfort level  11/7/2023 0307 by Dunia Bond RN  Outcome: Progressing       Problem: Skin/Tissue Integrity  Goal: Absence of new skin

## 2023-11-08 ENCOUNTER — APPOINTMENT (OUTPATIENT)
Dept: NEUROLOGY | Age: 73
DRG: 871 | End: 2023-11-08
Payer: COMMERCIAL

## 2023-11-08 LAB
ANION GAP SERPL CALCULATED.3IONS-SCNC: 16 MMOL/L (ref 7–16)
BUN SERPL-MCNC: 29 MG/DL (ref 6–23)
CALCIUM SERPL-MCNC: 9 MG/DL (ref 8.6–10.2)
CHLORIDE SERPL-SCNC: 99 MMOL/L (ref 98–107)
CO2 SERPL-SCNC: 24 MMOL/L (ref 22–29)
CREAT SERPL-MCNC: 4.9 MG/DL (ref 0.5–1)
ERYTHROCYTE [DISTWIDTH] IN BLOOD BY AUTOMATED COUNT: 15.2 % (ref 11.5–15)
FOLATE SERPL-MCNC: 9.8 NG/ML (ref 4.8–24.2)
GFR SERPL CREATININE-BSD FRML MDRD: 9 ML/MIN/1.73M2
GLUCOSE BLD-MCNC: 161 MG/DL (ref 74–99)
GLUCOSE BLD-MCNC: 169 MG/DL (ref 74–99)
GLUCOSE BLD-MCNC: 180 MG/DL (ref 74–99)
GLUCOSE BLD-MCNC: 188 MG/DL (ref 74–99)
GLUCOSE SERPL-MCNC: 155 MG/DL (ref 74–99)
HCT VFR BLD AUTO: 27 % (ref 34–48)
HGB BLD-MCNC: 7.9 G/DL (ref 11.5–15.5)
MAGNESIUM SERPL-MCNC: 2.3 MG/DL (ref 1.6–2.6)
MCH RBC QN AUTO: 33.1 PG (ref 26–35)
MCHC RBC AUTO-ENTMCNC: 29.3 G/DL (ref 32–34.5)
MCV RBC AUTO: 113 FL (ref 80–99.9)
MICROORGANISM SPEC CULT: ABNORMAL
MICROORGANISM/AGENT SPEC: ABNORMAL
PHOSPHATE SERPL-MCNC: 4.2 MG/DL (ref 2.5–4.5)
PLATELET, FLUORESCENCE: 275 K/UL (ref 130–450)
PMV BLD AUTO: 10.1 FL (ref 7–12)
POTASSIUM SERPL-SCNC: 4.2 MMOL/L (ref 3.5–5)
RBC # BLD AUTO: 2.39 M/UL (ref 3.5–5.5)
SERVICE CMNT-IMP: ABNORMAL
SODIUM SERPL-SCNC: 139 MMOL/L (ref 132–146)
SPECIMEN DESCRIPTION: ABNORMAL
TSH SERPL DL<=0.05 MIU/L-ACNC: 1.02 UIU/ML (ref 0.27–4.2)
VIT B12 SERPL-MCNC: 928 PG/ML (ref 211–946)
WBC OTHER # BLD: 10 K/UL (ref 4.5–11.5)

## 2023-11-08 PROCEDURE — 82962 GLUCOSE BLOOD TEST: CPT

## 2023-11-08 PROCEDURE — 2580000003 HC RX 258

## 2023-11-08 PROCEDURE — 6360000002 HC RX W HCPCS

## 2023-11-08 PROCEDURE — 36415 COLL VENOUS BLD VENIPUNCTURE: CPT

## 2023-11-08 PROCEDURE — 6360000002 HC RX W HCPCS: Performed by: INTERNAL MEDICINE

## 2023-11-08 PROCEDURE — 85027 COMPLETE CBC AUTOMATED: CPT

## 2023-11-08 PROCEDURE — 2580000003 HC RX 258: Performed by: FAMILY MEDICINE

## 2023-11-08 PROCEDURE — 97530 THERAPEUTIC ACTIVITIES: CPT

## 2023-11-08 PROCEDURE — 97161 PT EVAL LOW COMPLEX 20 MIN: CPT

## 2023-11-08 PROCEDURE — 80048 BASIC METABOLIC PNL TOTAL CA: CPT

## 2023-11-08 PROCEDURE — C9113 INJ PANTOPRAZOLE SODIUM, VIA: HCPCS

## 2023-11-08 PROCEDURE — 83735 ASSAY OF MAGNESIUM: CPT

## 2023-11-08 PROCEDURE — 97165 OT EVAL LOW COMPLEX 30 MIN: CPT

## 2023-11-08 PROCEDURE — 84100 ASSAY OF PHOSPHORUS: CPT

## 2023-11-08 PROCEDURE — 82746 ASSAY OF FOLIC ACID SERUM: CPT

## 2023-11-08 PROCEDURE — 2140000000 HC CCU INTERMEDIATE R&B

## 2023-11-08 PROCEDURE — 6370000000 HC RX 637 (ALT 250 FOR IP): Performed by: FAMILY MEDICINE

## 2023-11-08 PROCEDURE — 84443 ASSAY THYROID STIM HORMONE: CPT

## 2023-11-08 PROCEDURE — A4216 STERILE WATER/SALINE, 10 ML: HCPCS

## 2023-11-08 PROCEDURE — 82607 VITAMIN B-12: CPT

## 2023-11-08 PROCEDURE — 99232 SBSQ HOSP IP/OBS MODERATE 35: CPT | Performed by: FAMILY MEDICINE

## 2023-11-08 PROCEDURE — 6360000002 HC RX W HCPCS: Performed by: FAMILY MEDICINE

## 2023-11-08 PROCEDURE — 2580000003 HC RX 258: Performed by: INTERNAL MEDICINE

## 2023-11-08 PROCEDURE — 95819 EEG AWAKE AND ASLEEP: CPT

## 2023-11-08 RX ADMIN — METOPROLOL SUCCINATE 25 MG: 25 TABLET, EXTENDED RELEASE ORAL at 08:48

## 2023-11-08 RX ADMIN — PETROLATUM: 420 OINTMENT TOPICAL at 21:41

## 2023-11-08 RX ADMIN — Medication 10 ML: at 21:46

## 2023-11-08 RX ADMIN — MICONAZOLE NITRATE: 20.6 POWDER TOPICAL at 08:49

## 2023-11-08 RX ADMIN — PIPERACILLIN AND TAZOBACTAM 4500 MG: 4; .5 INJECTION, POWDER, LYOPHILIZED, FOR SOLUTION INTRAVENOUS at 09:04

## 2023-11-08 RX ADMIN — DIVALPROEX SODIUM 250 MG: 125 CAPSULE, COATED PELLETS ORAL at 08:50

## 2023-11-08 RX ADMIN — GABAPENTIN 100 MG: 100 CAPSULE ORAL at 08:48

## 2023-11-08 RX ADMIN — PANTOPRAZOLE SODIUM 40 MG: 40 INJECTION, POWDER, FOR SOLUTION INTRAVENOUS at 21:40

## 2023-11-08 RX ADMIN — GABAPENTIN 100 MG: 100 CAPSULE ORAL at 21:40

## 2023-11-08 RX ADMIN — SENNOSIDES 17.2 MG: 8.6 TABLET, FILM COATED ORAL at 21:40

## 2023-11-08 RX ADMIN — BACLOFEN 10 MG: 10 TABLET ORAL at 15:19

## 2023-11-08 RX ADMIN — PANTOPRAZOLE SODIUM 40 MG: 40 INJECTION, POWDER, FOR SOLUTION INTRAVENOUS at 12:28

## 2023-11-08 RX ADMIN — MICONAZOLE NITRATE: 20.6 POWDER TOPICAL at 21:41

## 2023-11-08 RX ADMIN — LACTULOSE 10 G: 20 SOLUTION ORAL at 08:47

## 2023-11-08 RX ADMIN — CALCIUM CARBONATE 500 MG: 500 TABLET, CHEWABLE ORAL at 15:19

## 2023-11-08 RX ADMIN — BACLOFEN 10 MG: 10 TABLET ORAL at 21:40

## 2023-11-08 RX ADMIN — BACLOFEN 10 MG: 10 TABLET ORAL at 08:48

## 2023-11-08 RX ADMIN — PIPERACILLIN AND TAZOBACTAM 4500 MG: 4; .5 INJECTION, POWDER, LYOPHILIZED, FOR SOLUTION INTRAVENOUS at 21:45

## 2023-11-08 RX ADMIN — GABAPENTIN 100 MG: 100 CAPSULE ORAL at 15:19

## 2023-11-08 RX ADMIN — DIVALPROEX SODIUM 250 MG: 125 CAPSULE, COATED PELLETS ORAL at 15:19

## 2023-11-08 RX ADMIN — OXYCODONE AND ACETAMINOPHEN 1 TABLET: 5; 325 TABLET ORAL at 16:09

## 2023-11-08 RX ADMIN — POLYETHYLENE GLYCOL 3350 17 G: 17 POWDER, FOR SOLUTION ORAL at 08:55

## 2023-11-08 RX ADMIN — ESCITALOPRAM OXALATE 10 MG: 10 TABLET ORAL at 08:48

## 2023-11-08 RX ADMIN — BUSPIRONE HYDROCHLORIDE 10 MG: 5 TABLET ORAL at 08:48

## 2023-11-08 RX ADMIN — LORAZEPAM 1 MG: 2 INJECTION INTRAMUSCULAR; INTRAVENOUS at 00:45

## 2023-11-08 RX ADMIN — SENNOSIDES 17.2 MG: 8.6 TABLET, FILM COATED ORAL at 08:48

## 2023-11-08 RX ADMIN — PETROLATUM: 420 OINTMENT TOPICAL at 08:49

## 2023-11-08 RX ADMIN — Medication 10 ML: at 09:03

## 2023-11-08 RX ADMIN — OXYCODONE AND ACETAMINOPHEN 1 TABLET: 5; 325 TABLET ORAL at 08:48

## 2023-11-08 ASSESSMENT — PAIN DESCRIPTION - ONSET: ONSET: ON-GOING

## 2023-11-08 ASSESSMENT — PAIN DESCRIPTION - ORIENTATION: ORIENTATION: MID

## 2023-11-08 ASSESSMENT — PAIN SCALES - GENERAL
PAINLEVEL_OUTOF10: 4
PAINLEVEL_OUTOF10: 9

## 2023-11-08 ASSESSMENT — PAIN DESCRIPTION - FREQUENCY: FREQUENCY: CONTINUOUS

## 2023-11-08 ASSESSMENT — PAIN DESCRIPTION - LOCATION
LOCATION: BACK
LOCATION: OTHER (COMMENT)

## 2023-11-08 ASSESSMENT — PAIN DESCRIPTION - DESCRIPTORS: DESCRIPTORS: ACHING;CRUSHING;GNAWING

## 2023-11-08 ASSESSMENT — PAIN - FUNCTIONAL ASSESSMENT: PAIN_FUNCTIONAL_ASSESSMENT: PREVENTS OR INTERFERES WITH ALL ACTIVE AND SOME PASSIVE ACTIVITIES

## 2023-11-08 ASSESSMENT — PAIN DESCRIPTION - PAIN TYPE: TYPE: CHRONIC PAIN

## 2023-11-08 NOTE — PROCEDURES
EEG Report  Allison Joel is a 75 y.o. female      Appointment Date 11/8/2023   Appointment Time  1:30pm   Facility Location [facility] EEG Number [id number]   Type of Study routine Floor 6421-A     Technical Specifications  Technician [id number] of consciousness Moaning/asleep/not talking   Sleep deprived? no   Hyperventilation tested? no   Photic stim tested? no   EEG recording Standard 10-20 electrode placement    Duration of recording 25 mins   EEG complete? yes       Clinical History   ***    Medications    Current Facility-Administered Medications:     pantoprazole (PROTONIX) 40 mg in sodium chloride (PF) 0.9 % 10 mL injection, 40 mg, IntraVENous, Q12H, Portia Lien, DO, 40 mg at 11/08/23 1228    miconazole (MICOTIN) 2 % powder, , Topical, BID, Karlie Stevens MD, Given at 11/08/23 0849    white petrolatum ointment, , Topical, BID, Given at 11/08/23 0849 **AND** white petrolatum ointment, , Topical, TID PRN, Karlie Stevens MD    divalproex (DEPAKOTE SPRINKLE) DR capsule 250 mg, 250 mg, Per NG tube, 3 times per day, Karlie Stevens MD, 250 mg at 11/08/23 0850    LORazepam (ATIVAN) injection 1 mg, 1 mg, IntraVENous, Q6H PRN, Karlie Stevens MD, 1 mg at 11/08/23 0045    LORazepam (ATIVAN) tablet 0.5 mg, 0.5 mg, Oral, Q6H PRN, PABLO Chacon - CNP    epoetin derek-epbx (RETACRIT) injection 6,000 Units, 6,000 Units, IntraVENous, Once per day on Mon Wed Fri, Marilin Terrazas APRN - CNS, 6,000 Units at 11/06/23 1734    piperacillin-tazobactam (ZOSYN) 4,500 mg in sodium chloride 0.9 % 100 mL IVPB ([id number]), 4,500 mg, IntraVENous, Q12H, [facility]adi Barnett [address] [facility], Stopped at 11/08/23 1358    midodrine (PROAMATINE) tablet 15 mg, 15 mg, Oral, Once, Pepe Viera, DO    baclofen (LIORESAL) tablet 10 mg, 10 mg, Oral, TID, Karlie Stevens MD, 10 mg at 11/08/23 0848    bisacodyl (DULCOLAX) suppository 10 mg, 10 mg, Rectal, Daily PRN, Karlie Stevens MD    busPIRone (BUSPAR) tablet 10 mg, 10 mg, Oral, Daily, Karlie Stevens MD, SubCUTAneous, PRN, Alyse Roy MD    dextrose 10 % infusion, , IntraVENous, Continuous PRN, Alyse Roy MD    insulin lispro (HUMALOG) injection vial 0-8 Units, 0-8 Units, SubCUTAneous, TID WC, Alyse Roy MD    insulin lispro (HUMALOG) injection vial 0-4 Units, 0-4 Units, SubCUTAneous, Nightly, Alyse Roy MD    naloxone [facility]) injection 0.4 mg, 0.4 mg, IntraVENous, PRN, Alyse Roy MD, 0.4 mg at 11/04/23 1758        Physician Interpretation    General EEG Report  ***    Type of EEG >>  ***          Epileptiform Discharge   ***    Non-Epileptiform Abnormality  ***    Ictal  ***    General Impression  ***    Bhaskar Yeung

## 2023-11-08 NOTE — CARE COORDINATION
11/8:  Update CM Note:  Pt presented to the ER for AMS from [address]. Pt is on room air at 95%, Iv Ativan prn &  Iv Zosyn. Pt had a NG placed on 11/6. Pt is a ESRD M-T-Th-Fri in house den location. Pt had EEG pending report & MRI needed. Per pt's family they are considering Home with [address] vs Home with Hospice/Palliative depending on the next few days. Pt was active with [facility] in [place] M-W-F [phone]. [facility] work to start process for [facility] location. If pt goes home will need Hospital bed, roger lift & [address] doesn't have any preferences & declined list.  Cm sent referrals to [address]. [facility] can accept on 11/10. Sw/KAREN will continue to follow.   Electronically signed by Shani Guzman RN on 11/8/2023 at 3:16 PM

## 2023-11-08 NOTE — PROGRESS NOTES
Call placed to Dr. Thomas [place] office, spoke to [place]. [place] will have Dr. Azalea Watts call Dr. Brayan Williamson regarding possible lumbar puncture to try and explain patient AMS at [phone].

## 2023-11-08 NOTE — PROGRESS NOTES
When going to give patient meds, this nurse pulled back on NG tube and got a coffee ground/brown drainage. Dr. Kelly Ford notified. Gastroccult +, new orders for IV Protonix, stop tube feed and hook NG tube up to low-intermittent suction. New consult for general surgery placed.      Remy Rojas RN

## 2023-11-08 NOTE — PROGRESS NOTES
Occupational Therapy  OCCUPATIONAL THERAPY INITIAL EVALUATION    BONNY lKein [address] [facility]   [address], [facility]      XMNY:                                                Patient Name: Rd Chi  MRN: [id number]  : 1950  Room: [address] [place], [place] [address]    Evaluating OT: Shellie Stuartira OTR/L #[id number]     Referring Provider: Atif Martini MD  Specific Provider Orders/Date: OT eval and treat 23    Diagnosis: Wound of sacral region, initial encounter [S31.000A]  Suspected soft tissue infection [R68.89]  Altered mental status, unspecified altered mental status type [R41.82]  Altered mental status, unspecified [R41.82]   Pt admitted to hospital due to being unresponsive at [facility]; AMS    Pertinent Medical History:  has a past medical history of Anemia in chronic kidney disease, Anxiety and depression, Arthritis, Chronic pain syndrome, COVID-19, COVID-19, Decreased dorsalis pedis pulse, Diabetes mellitus ([address]), Dysphagia, oral phase, ESRD on hemodialysis ([address]), GERD (gastroesophageal reflux disease), Hemodialysis patient ([address]), Hyperkalemia, Hypertension, Hypertensive kidney disease with stage 4 chronic kidney disease ([address]), Insomnia, Kidney stones, MDD (major depressive disorder), Moderate protein-calorie malnutrition ([address]), Morbid obesity ([address]), Muscle weakness (generalized), Obesity, Pressure injury of sacral region, stage 3 ([address]), Pressure ulcer of sacral region, Sacral pressure ulcer, Unspecified osteoarthritis, unspecified site, and Weakness generalized.        Precautions:  Fall Risk, cognition, TAPS, NGT to suction, sacral wound, bed alarm, TSM    Assessment of current deficits    [x] Functional mobility  [x]ADLs  [x] Strength               [x]Cognition    [x] Functional transfers   [x] IADLs         [x] Safety Awareness   [x]Endurance    [x] Fine Coordination              [x] Balance      [x] Vision/perception   [x]Sensation     [x]Gross Motor Coordination  [x]

## 2023-11-08 NOTE — CONSULTS
Patient lying in bed and poorly responsive. She is constantly moaning intermittently without any obvious stimulus or etiology provoking her moaning. No abnormal movements were observed during evaluation in patients. He was not responding to commands or voice. No spontaneous eye opening observed. Examination was very limited due to poor patient cooperation. No pupillary abnormalities appreciated. Facial movements were symmetric there was appropriate withdrawal to noxious stimuli in bilateral upper and lower extremities with moderate increased tone noted in both upper and lower extremities. Unable to test strength in the extremities secondary to lack of cooperation. No tremors or abnormal movements observed extremities examination. She is nonambulatory. Difficult to elicit reflexes in extremities with noted downgoing toes with Babinski testing. Gait was not tested. Severe osteoarthritic changes bilateral knees with heart nodules PET scan in the left pretibial area. Impression:  1. Acute alteration mental status with suspicion of metabolic encephalopathy  2. Acute UTI: Positive UA. 3. Cognitive dysfunction      Recommendations:  1. EEG to assess brain activity given acute alteration in mental status. 2. Continue current medical management  3. Further pending results of EEG.          Remberto Vanegas

## 2023-11-08 NOTE — CONSULTS
insulin glargine (LANTUS) 100 UNIT/ML injection vial Inject 5 Units into the skin nightly   Yes Shelli Obrien MD   baclofen (LIORESAL) 10 MG tablet Take 1 tablet by mouth 3 times daily   Yes Shelli Obrien MD   calcium carbonate (TUMS) 500 MG chewable tablet Take 1 tablet by mouth 3 times daily (before meals)   Yes Shelli Obrien MD   lactulose (CHRONULAC) 10 GM/15ML solution Take 15 mLs by mouth daily as needed (constipation)   Yes Shelli Obrien MD   traMADol (ULTRAM) 50 MG tablet Take 1 tablet by mouth every 8 hours as needed for Pain. Max Daily Amount: 150 mg   Yes Shelli Obrien MD   acetaminophen (TYLENOL) 325 MG tablet Take 2 tablets by mouth every 4 hours as needed for Pain   Yes Shelli Obrien MD   ondansetron (ZOFRAN) 4 MG tablet Take 1 tablet by mouth every 8 hours as needed for Nausea or Vomiting   Yes Shelli Obrien MD   gabapentin (NEURONTIN) 100 MG capsule Take 1 capsule by mouth 3 times daily. Shelli Obrien MD   oxyCODONE-acetaminophen (PERCOCET) 5-325 MG per tablet Take 1 tablet by mouth every 6 hours as needed for Pain. Shelli Obrien MD   prochlorperazine (COMPAZINE) 10 MG tablet Take 1 tablet by mouth every 6 hours as needed (nausea) 3/18/23   Mitzy Avalos MD   miconazole (MICOTIN) 2 % powder Apply topically 2 times daily.  3/18/23   Mitzy Avalos MD   vitamin C (ASCORBIC ACID) 500 MG tablet Take 1 tablet by mouth 2 times daily    Shelli Obrien MD   bisacodyl (DULCOLAX) 10 MG suppository Place 1 suppository rectally daily as needed for Constipation    Shelli Obrien MD   glucose (GLUTOSE) 40 % GEL Take 37.5 mLs by mouth as needed (hypoglycemia)    Shelli Obrien MD   busPIRone (BUSPAR) 10 MG tablet Take 1 tablet by mouth daily    Shelli Obrien MD   polyethylene glycol (GLYCOLAX) 17 g packet Take 1 packet by mouth daily as needed for Constipation    hSelli Obrien MD   senna (SENOKOT) 8.6 MG PGY-1    Electronically signed by Mushtaq Albrecht DO on 11/8/23 at 1:54 AM EST     ATTENDING PHYSICIAN PROGRESS NOTE     Diagnosis of GIB  External Notes reviewed including medical notes  NG in  CBC, CMP personally reviewed  Will order cbc   Monitor h/h     I personally examined the patient, reviewed the records (including other consultation but not limited to them) and discussed the case with the resident. I personally reviewed all relevant labs and imaging data. Please refer to the resident's note. I agree with the assessment and plan with the following corrections/ additions. The following summarizes my clinical findings and independent assessment. I am actively managing this patient's medication.            Serena Lugo MD

## 2023-11-08 NOTE — PLAN OF CARE
Problem: Chronic Conditions and Co-morbidities  Goal: Patient's chronic conditions and co-morbidity symptoms are monitored and maintained or improved  Outcome: Progressing     Problem: Discharge Planning  Goal: Discharge to home or other facility with appropriate resources  Outcome: Progressing     Problem: Safety - Adult  Goal: Free from fall injury  Outcome: Progressing     Problem: Pain  Goal: Verbalizes/displays adequate comfort level or baseline comfort level  Outcome: Progressing     Problem: Skin/Tissue Integrity  Goal: Absence of new skin breakdown  Description: 1. Monitor for areas of redness and/or skin breakdown  2. Assess vascular access sites hourly  3. Every 4-6 hours minimum:  Change oxygen saturation probe site  4. Every 4-6 hours:  If on nasal continuous positive airway pressure, respiratory therapy assess nares and determine need for appliance change or resting period.   Outcome: Progressing     Problem: Nutrition Deficit:  Goal: Optimize nutritional status  Outcome: Progressing

## 2023-11-08 NOTE — CARE COORDINATION
11/8:  Update CM Note:  Per MRI pt will need HD directly after MRI. Will attempt tomorrow.   Electronically signed by Padmini Nava RN on 11/8/2023 at 3:19 PM

## 2023-11-08 NOTE — PROGRESS NOTES
Physical Therapy Initial Evaluation    Name: Timmy Rendon  : 1950  MRN: [id number]      Date of Service: 2023    Evaluating PT:  Clay Corona, PT [id number]    Referring provider/PT Order:  PT Eval and Treat   23 1645  PT evaluation and treat       Arnoldo Reynoso MD     Room #:  2688/5291-F  Diagnosis:  Wound of sacral region, initial encounter [S31.000A]  Suspected soft tissue infection [R68.89]  Altered mental status, unspecified altered mental status type [R41.82]  Altered mental status, unspecified [R41.82]    Procedure/Surgery:  none  Precautions:  Falls, NG to suction, skin integrity, TAPS,   Equipment Needs: To be determined,    SUBJECTIVE:    Per chart patient admitted from facility. Equipment owned: Equipment at [facility],      OBJECTIVE:   Initial Evaluation  Date: 23 Treatment Short Term/ Long Term   Goals   AM-PAC 6 Clicks 0/40     Was pt agreeable to Eval/treatment? yes     Does pt have pain? Patient quiet at this time. Bed Mobility  Rolling: Dep  Supine to sit:   NT   Sit to supine: NT   Scooting: NT   Rolling: Mod   Supine to sit: Mod   Sit to supine: Mod   Scooting: Mod    Transfers Sit to stand: NT   Stand to sit: NT  Stand pivot: NT   Sit to stand: Max    Stand to sit: Max    Stand pivot: Max     Ambulation    NT     Stair negotiation: ascended and descended  NT       Strength/ROM:   See OT note for BUE ROM and strength  RLE grossly Unable to assess. LLE grossly Unable to assess. RLE PROM WFL  LLE PROM WFL    Balance:     Static Sitting: NT unsafe  Dynamic Sitting: NT  Static Standing: NT   Dynamic Standing: NT       Patient is Alert & Oriented x did not react to name  and does not follow directions   Sensation:  Pt denies numbness and tingling to extremities  Edema:  BLE  Therapeutic Exercises:  Functional activity as stated above dependent no participation in functional mobility.      Patient education  Pt educated on role of Physical Therapy, risks of immobility, minutes  [] Neuromuscular reeducation 37215 minutes     Emily Mccarty, [address] [place] [address]

## 2023-11-08 NOTE — PROGRESS NOTES
Message sent to Doctor Faustina Hatchet about family about bed side requesting to speak to them about transferring patient to [facility] per request.

## 2023-11-08 NOTE — PROGRESS NOTES
Palliative Care LSW, chart reviewed with NP. Pt off floor for testing, family wishing further information to determine cause of AMS.

## 2023-11-09 LAB
GLUCOSE BLD-MCNC: 131 MG/DL (ref 74–99)
GLUCOSE BLD-MCNC: 163 MG/DL (ref 74–99)
GLUCOSE BLD-MCNC: 175 MG/DL (ref 74–99)
GLUCOSE BLD-MCNC: 192 MG/DL (ref 74–99)

## 2023-11-09 PROCEDURE — 2580000003 HC RX 258

## 2023-11-09 PROCEDURE — 1200000000 HC SEMI PRIVATE

## 2023-11-09 PROCEDURE — 6360000002 HC RX W HCPCS: Performed by: FAMILY MEDICINE

## 2023-11-09 PROCEDURE — 6360000002 HC RX W HCPCS: Performed by: INTERNAL MEDICINE

## 2023-11-09 PROCEDURE — 82962 GLUCOSE BLOOD TEST: CPT

## 2023-11-09 PROCEDURE — 2580000003 HC RX 258: Performed by: FAMILY MEDICINE

## 2023-11-09 PROCEDURE — C9113 INJ PANTOPRAZOLE SODIUM, VIA: HCPCS

## 2023-11-09 PROCEDURE — 2580000003 HC RX 258: Performed by: INTERNAL MEDICINE

## 2023-11-09 PROCEDURE — 5A1D70Z PERFORMANCE OF URINARY FILTRATION, INTERMITTENT, LESS THAN 6 HOURS PER DAY: ICD-10-PCS | Performed by: PEDIATRICS

## 2023-11-09 PROCEDURE — 6360000002 HC RX W HCPCS

## 2023-11-09 PROCEDURE — 99232 SBSQ HOSP IP/OBS MODERATE 35: CPT | Performed by: FAMILY MEDICINE

## 2023-11-09 PROCEDURE — 93005 ELECTROCARDIOGRAM TRACING: CPT | Performed by: FAMILY MEDICINE

## 2023-11-09 PROCEDURE — A4216 STERILE WATER/SALINE, 10 ML: HCPCS

## 2023-11-09 PROCEDURE — 90935 HEMODIALYSIS ONE EVALUATION: CPT

## 2023-11-09 PROCEDURE — 6370000000 HC RX 637 (ALT 250 FOR IP): Performed by: FAMILY MEDICINE

## 2023-11-09 RX ADMIN — DIVALPROEX SODIUM 250 MG: 125 CAPSULE, COATED PELLETS ORAL at 14:00

## 2023-11-09 RX ADMIN — LORAZEPAM 1 MG: 2 INJECTION INTRAMUSCULAR; INTRAVENOUS at 11:32

## 2023-11-09 RX ADMIN — DIVALPROEX SODIUM 250 MG: 125 CAPSULE, COATED PELLETS ORAL at 21:06

## 2023-11-09 RX ADMIN — SENNOSIDES 17.2 MG: 8.6 TABLET, FILM COATED ORAL at 21:03

## 2023-11-09 RX ADMIN — PETROLATUM: 420 OINTMENT TOPICAL at 23:05

## 2023-11-09 RX ADMIN — PANTOPRAZOLE SODIUM 40 MG: 40 INJECTION, POWDER, FOR SOLUTION INTRAVENOUS at 11:35

## 2023-11-09 RX ADMIN — MICONAZOLE NITRATE: 20.6 POWDER TOPICAL at 11:39

## 2023-11-09 RX ADMIN — PIPERACILLIN AND TAZOBACTAM 4500 MG: 4; .5 INJECTION, POWDER, LYOPHILIZED, FOR SOLUTION INTRAVENOUS at 20:53

## 2023-11-09 RX ADMIN — PIPERACILLIN AND TAZOBACTAM 4500 MG: 4; .5 INJECTION, POWDER, LYOPHILIZED, FOR SOLUTION INTRAVENOUS at 12:26

## 2023-11-09 RX ADMIN — PETROLATUM: 420 OINTMENT TOPICAL at 11:39

## 2023-11-09 RX ADMIN — INSULIN GLARGINE 5 UNITS: 100 INJECTION, SOLUTION SUBCUTANEOUS at 22:46

## 2023-11-09 RX ADMIN — GABAPENTIN 100 MG: 100 CAPSULE ORAL at 21:02

## 2023-11-09 RX ADMIN — BACLOFEN 10 MG: 10 TABLET ORAL at 11:38

## 2023-11-09 RX ADMIN — METOPROLOL SUCCINATE 25 MG: 25 TABLET, EXTENDED RELEASE ORAL at 16:51

## 2023-11-09 RX ADMIN — SENNOSIDES 17.2 MG: 8.6 TABLET, FILM COATED ORAL at 11:36

## 2023-11-09 RX ADMIN — GABAPENTIN 100 MG: 100 CAPSULE ORAL at 11:37

## 2023-11-09 RX ADMIN — ESCITALOPRAM OXALATE 10 MG: 10 TABLET ORAL at 11:36

## 2023-11-09 RX ADMIN — BACLOFEN 10 MG: 10 TABLET ORAL at 21:01

## 2023-11-09 RX ADMIN — MICONAZOLE NITRATE: 20.6 POWDER TOPICAL at 21:17

## 2023-11-09 RX ADMIN — BUSPIRONE HYDROCHLORIDE 10 MG: 5 TABLET ORAL at 11:38

## 2023-11-09 RX ADMIN — Medication 10 ML: at 23:09

## 2023-11-09 RX ADMIN — METOPROLOL SUCCINATE 25 MG: 25 TABLET, EXTENDED RELEASE ORAL at 15:51

## 2023-11-09 RX ADMIN — MIDODRINE HYDROCHLORIDE 15 MG: 10 TABLET ORAL at 11:35

## 2023-11-09 RX ADMIN — Medication 10 ML: at 11:39

## 2023-11-09 RX ADMIN — PANTOPRAZOLE SODIUM 40 MG: 40 INJECTION, POWDER, FOR SOLUTION INTRAVENOUS at 23:04

## 2023-11-09 RX ADMIN — ACETAMINOPHEN 650 MG: 325 TABLET ORAL at 22:37

## 2023-11-09 RX ADMIN — LORAZEPAM 1 MG: 2 INJECTION INTRAMUSCULAR; INTRAVENOUS at 03:26

## 2023-11-09 ASSESSMENT — PAIN DESCRIPTION - ORIENTATION
ORIENTATION: POSTERIOR
ORIENTATION: POSTERIOR

## 2023-11-09 ASSESSMENT — PAIN DESCRIPTION - ONSET
ONSET: ON-GOING
ONSET: ON-GOING

## 2023-11-09 ASSESSMENT — ENCOUNTER SYMPTOMS
SHORTNESS OF BREATH: 0
BACK PAIN: 0
CONSTIPATION: 0
SORE THROAT: 0
PHOTOPHOBIA: 0
VOMITING: 0
COUGH: 0
RHINORRHEA: 0
ABDOMINAL PAIN: 0
DIARRHEA: 0
WHEEZING: 0

## 2023-11-09 ASSESSMENT — PAIN DESCRIPTION - FREQUENCY
FREQUENCY: CONTINUOUS
FREQUENCY: CONTINUOUS

## 2023-11-09 ASSESSMENT — PAIN SCALES - GENERAL
PAINLEVEL_OUTOF10: 6
PAINLEVEL_OUTOF10: 7
PAINLEVEL_OUTOF10: 5

## 2023-11-09 ASSESSMENT — PAIN DESCRIPTION - DESCRIPTORS
DESCRIPTORS: ACHING
DESCRIPTORS: ACHING;SHARP

## 2023-11-09 ASSESSMENT — PAIN - FUNCTIONAL ASSESSMENT
PAIN_FUNCTIONAL_ASSESSMENT: PREVENTS OR INTERFERES WITH ALL ACTIVE AND SOME PASSIVE ACTIVITIES
PAIN_FUNCTIONAL_ASSESSMENT: PREVENTS OR INTERFERES SOME ACTIVE ACTIVITIES AND ADLS

## 2023-11-09 ASSESSMENT — PAIN DESCRIPTION - LOCATION
LOCATION: OTHER (COMMENT);BUTTOCKS
LOCATION: HIP

## 2023-11-09 ASSESSMENT — PAIN DESCRIPTION - PAIN TYPE: TYPE: ACUTE PAIN;NEUROPATHIC PAIN

## 2023-11-09 NOTE — PROGRESS NOTES
Lab Results   Component Value Date     11/08/2023    K 4.2 11/08/2023    CL 99 11/08/2023    CO2 24 11/08/2023    BUN 29 (H) 11/08/2023    CREATININE 4.9 (H) 11/08/2023    GLUCOSE 155 (H) 11/08/2023    CALCIUM 9.0 11/08/2023    PROT 6.3 (L) 11/05/2023    LABALBU 2.4 (L) 11/05/2023    BILITOT 0.3 11/05/2023    ALKPHOS 83 11/05/2023    AST 9 11/05/2023    ALT 6 11/05/2023    LABGLOM 9 (L) 11/08/2023    GFRAA 7 10/12/2022        Hemoglobin A1C   Date Value Ref Range Status   11/02/2023 6.9 (H) 4.0 - 5.6 % Final     Lab Results   Component Value Date    CHOL 109 11/11/2022    CHOL 144 07/15/2022    CHOL 191 10/12/2011     Lab Results   Component Value Date    TRIG 119 11/11/2022    TRIG 143 07/15/2022    TRIG 181 (H) 10/12/2011     Lab Results   Component Value Date    HDL 28 11/11/2022    HDL 34 07/15/2022    HDL 37.0 (A) 10/12/2011     Lab Results   Component Value Date    LDLCALC 57 11/11/2022    LDLCALC 81 07/15/2022    LDLCALC 118 (H) 10/12/2011     Lab Results   Component Value Date    LABVLDL 24 11/11/2022    LABVLDL 29 07/15/2022     No results found for: \"CHOLHDLRATIO\"  Lab Results   Component Value Date    TSH 1.02 11/08/2023               ASSESSMENT/PLAN:  1. ESRD on hemodialysis ([address])  2. Type 2 diabetes mellitus with hyperglycemia, with long-term current use of insulin (HCC)  3. Hypertension secondary to other renal disorders  4. Pressure injury of sacral region, stage 4 ([address])  5. Anemia in chronic kidney disease, unspecified CKD stage  6. Pure hypercholesterolemia         Seen today for monthly assessment   Rash on exam most likely shingles. Underlying bacterial infection. Nursing staff to contact nephrology for renal dosing of antiviral.  We will also start doxycycline 100 mg twice daily. Chart reviewed: continue with orders per chart in point click care  HTN: continue metoprolol 25 mg daily  DMII: Continue insulin orders per chart  Labs: Collect monthly CBC and CMP A1c every 3 months.   Labs

## 2023-11-09 NOTE — PROGRESS NOTES
[facility]  Nephrology Progress Note    Patient's Name: Yesica Trinidad     History OF Present Illness From 11/5 Consult Note: \"The patient is a 75year old female with past medical history of anemia in CKD, ESRD on HD, DM, hyperkalemia, HTN, kidney stones, and wounds. The patient is well known to our practice as we follow her ESRD on hemodialysis at [facility] [place], therefore, a full consult is deferred. She was recently hospitalized at this hospital on 10/31 -11/1 for treatment of nausea and vomiting which resolved. She returned to the ED on 11/3 with altered mental status. CT of the head showed no acute intracranial process. CXR neg for pleural effusion, pneumothorax. VS in the ED:/55, P-64, R 16, T-97.5. Lab data on 11/3 includes Na 135, K 4.5, CO2 33, BUN 36, Cr 5.0, lactic  acid 2.0, calcium 10, albumin 2.9, Hgb 8.8, WBC 16.5. Per the nurse's notes the patient was unresponsive upon arrival to her room last evening. Upon assessment the patient yells out when aroused, does not follow commands or answer questions. Daughter who is a respiratory therapist present and updated. In no respiratory distress. Urinary catheter draining a milky urine. \"        Subjective:    11/9: At present, patient was seen and examined on HD. Yelling out, moaning. Unable to answer questions or follow simple commands. NG in place. Await MRI of brain, planned for tomorrow if patient is still in house. Possible transfer to [facility] later today if bed becomes available.        Problem List:    Patient Active Problem List   Diagnosis    Neurologic gait dysfunction    Diabetes mellitus ([address])    Hypertension    Arthritis    Knee problem    Anemia due to stage 3 chronic kidney disease    Primary osteoarthritis of both knees    Moderate protein-calorie malnutrition (HCC)    Pressure injury of sacral region, stage 4 (HCC)    Pressure injury of right hip, stage 3 (HCC)    Left bundle branch block    Moderate 10/31/23 79.2 kg (174 lb 8 oz)   06/28/23 84.4 kg (186 lb)       Constitutional:  Sleeping, no acute distress  Neck: No JVD noted  Cardiovascular: Regular rate and rhythm, no murmurs or rubs  Respiratory:  Clear bilaterally upper lobes, diminished bases bilaterally, no rales, rhonchi, or wheezes  Gastrointestinal: soft, nontender, nondistended; active bowel sounds  Ext: Trace edema BLE; left upper arm AV fistula, + thrill, + bruit  Neuro: Sleeping, lethargic, does not answer questions, does not follows simple commands  Skin: No rash on exposed skin, sacral wound not examined      Data:      Recent Labs     11/06/23  1015 11/07/23 0449 11/08/23 0457   WBC 13.3* 8.0 10.0   HGB 7.5* 7.3* 7.9*   HCT 22.0* 23.5* 27.0*   .3* 105.9* 113.0*    245  --      Recent Labs     11/06/23  1015 11/07/23 0449 11/08/23 0457    138 139   K 5.1* 5.2* 4.2   CL 96* 97* 99   CO2 23 23 24   BUN 57* 67* 29*   CREATININE 7.0* 8.3* 4.9*   LABGLOM 6* 5* 9*   GLUCOSE 152* 127* 155*   CALCIUM 8.9 8.4* 9.0     No results for input(s): \"ALT\", \"AST\", \"ALKPHOS\", \"BILITOT\", \"BILIDIR\" in the last 72 hours.     Lab Results   Component Value Date    LABALBU 2.4 (L) 11/05/2023    LABALBU 2.9 (L) 11/04/2023    LABALBU 2.7 (L) 11/02/2023       Iron studies:  Lab Results   Component Value Date    FERRITIN 767 05/05/2021    IRON 145 09/13/2023    TIBC 116 (L) 05/05/2021     Vitamin B-12   Date Value Ref Range Status   11/08/2023 928 211 - 946 pg/mL Final     Folate   Date Value Ref Range Status   11/08/2023 9.8 4.8 - 24.2 ng/mL Final       Bone disease:  Lab Results   Component Value Date    MG 2.3 11/08/2023    PHOS 4.2 11/08/2023     Vit D, 25-Hydroxy   Date Value Ref Range Status   11/02/2023 39.1 30.0 - 100.0 ng/mL Final     PTH   Date Value Ref Range Status   11/06/2022 466 (H) 15 - 65 pg/mL Final       No components found for: \"URIC\"    Lab Results   Component Value Date/Time    COLORU Yellow 11/04/2023 06:52 PM    NITRU

## 2023-11-09 NOTE — PROGRESS NOTES
Spoke with [facility]. They have accepted the patient for transfer. Patient will be transferred once that is available. He could not answer exactly when that would be.

## 2023-11-09 NOTE — CARE COORDINATION
CM update. Patient at HD. Admitted with AMS. Per Attending note today, the patient is yelling out. Does not respond to commands. Patient Accepted at [facility], and  to transfer once bed is available. For MRI of brain Friday if still in house. Neurology is on consult. Patient from [facility], and was in their HD [facility] AT [place]. Updated Bárbara Ruiz at [facility] patient's plan is now to transfer to [facility]. Also updated Annalise at [facility] patient is transferring as well. Message left with daughter Parth Díaz to update. CM will follow.   Electronically signed by Julio Parry RN on 11/9/2023 at 11:20 AM

## 2023-11-09 NOTE — PLAN OF CARE
Attempted to see patient however he is off the unit in HD. Neurology will reattempt later if time permits. Patient currently awaiting transfer to [facility] per PCP request.  MRI brain is pending. We will follow until transferred.

## 2023-11-09 NOTE — PROGRESS NOTES
4 Eyes Skin Assessment     NAME:  Marisela Mcguire  YOB: 1950  MEDICAL RECORD NUMBER:  [id number]    The patient is being assessed for  Transfer to New Unit    I agree that at least one RN has performed a thorough Head to Toe Skin Assessment on the patient. ALL assessment sites listed below have been assessed. Areas assessed by both nurses:    Head, Face, Ears, Shoulders, Back, Chest, Arms, Elbows, Hands, Sacrum. Buttock, Coccyx, Ischium, Legs. Feet and Heels, and Under Medical Devices         Does the Patient have a Wound? Yes wound(s) were present on assessment.  LDA wound assessment was Initiated and completed by RN       Herbert Prevention initiated by RN: Yes  Wound Care Orders initiated by RN: Yes    Pressure Injury (Stage 3,4, Unstageable, DTI, NWPT, and Complex wounds) if present, place Wound referral order by RN under : No    New Ostomies, if present place, Ostomy referral order under : No     Nurse 1 eSignature: Electronically signed by Christopher Gonzales RN on 11/9/23 at 1:25 PM EST    **SHARE this note so that the co-signing nurse can place an eSignature**    Nurse 2 eSignature: Electronically signed by Bharat Jaquez RN on 11/9/23 at 7:19 PM EST

## 2023-11-09 NOTE — PROGRESS NOTES
signs of   gross infection\". 11/7 Wound care RN LISA Higgins notes Pressure injury to coccyx stage 4, present   on admission. Pink/red wound with sough with serosanguinous drainage. Treatment: ABD, Alginate, dry dressing, pressure prevention interventions    Thank you, Luciano Graf RN BSN [facility]  [phone]  Options provided:  -- This patient has stage 3 pressure ulcer of sacrococcygeal area present on   admission, stage 4 pressure ulcer of coccyx ruled out  -- This patient has stage 4 pressure ulcer of sacrococcygeal area  -- Other - I will add my own diagnosis  -- Disagree - Not applicable / Not valid  -- Disagree - Clinically unable to determine / Unknown  -- Refer to Clinical Documentation Reviewer    PROVIDER RESPONSE TEXT:    This patient has stage 3 pressure ulcer of sacrococcygeal area present on   admission, stage 4 pressure ulcer of coccyx was ruled out after study.     Query created by: Kay Solano on 11/8/2023 1:36 PM      Electronically signed by:  Irma Tinsley MD 11/9/2023 6:55 PM

## 2023-11-09 NOTE — PROCEDURES
MRI scheduled for [facility] with dialysis to follow, Ativan prior. Dialysis and floor notified.

## 2023-11-09 NOTE — PROGRESS NOTES
Per access line, no beds available at this time for [facility].   Electronically signed by Amanda Spencer RN on 11/9/2023 at 10:05 AM

## 2023-11-10 ENCOUNTER — APPOINTMENT (OUTPATIENT)
Dept: MRI IMAGING | Age: 73
DRG: 871 | End: 2023-11-10
Payer: COMMERCIAL

## 2023-11-10 LAB
ANION GAP SERPL CALCULATED.3IONS-SCNC: 19 MMOL/L (ref 7–16)
BUN SERPL-MCNC: 18 MG/DL (ref 6–23)
CALCIUM SERPL-MCNC: 9 MG/DL (ref 8.6–10.2)
CHLORIDE SERPL-SCNC: 90 MMOL/L (ref 98–107)
CO2 SERPL-SCNC: 22 MMOL/L (ref 22–29)
CREAT SERPL-MCNC: 3.6 MG/DL (ref 0.5–1)
EKG ATRIAL RATE: 67 BPM
EKG Q-T INTERVAL: 358 MS
EKG QRS DURATION: 138 MS
EKG QTC CALCULATION (BAZETT): 542 MS
EKG R AXIS: -27 DEGREES
EKG T AXIS: 150 DEGREES
EKG VENTRICULAR RATE: 138 BPM
ERYTHROCYTE [DISTWIDTH] IN BLOOD BY AUTOMATED COUNT: 14.8 % (ref 11.5–15)
GFR SERPL CREATININE-BSD FRML MDRD: 13 ML/MIN/1.73M2
GLUCOSE BLD-MCNC: 157 MG/DL (ref 74–99)
GLUCOSE BLD-MCNC: 177 MG/DL (ref 74–99)
GLUCOSE BLD-MCNC: 182 MG/DL (ref 74–99)
GLUCOSE BLD-MCNC: 183 MG/DL (ref 74–99)
GLUCOSE SERPL-MCNC: 187 MG/DL (ref 74–99)
HCT VFR BLD AUTO: 23.8 % (ref 34–48)
HGB BLD-MCNC: 7.5 G/DL (ref 11.5–15.5)
MAGNESIUM SERPL-MCNC: 1.9 MG/DL (ref 1.6–2.6)
MCH RBC QN AUTO: 32.9 PG (ref 26–35)
MCHC RBC AUTO-ENTMCNC: 31.5 G/DL (ref 32–34.5)
MCV RBC AUTO: 104.4 FL (ref 80–99.9)
PHOSPHATE SERPL-MCNC: 3.9 MG/DL (ref 2.5–4.5)
PLATELET # BLD AUTO: 271 K/UL (ref 130–450)
PMV BLD AUTO: 10.2 FL (ref 7–12)
POTASSIUM SERPL-SCNC: 3.7 MMOL/L (ref 3.5–5)
RBC # BLD AUTO: 2.28 M/UL (ref 3.5–5.5)
SODIUM SERPL-SCNC: 131 MMOL/L (ref 132–146)
WBC OTHER # BLD: 10.2 K/UL (ref 4.5–11.5)

## 2023-11-10 PROCEDURE — 2580000003 HC RX 258: Performed by: FAMILY MEDICINE

## 2023-11-10 PROCEDURE — 1200000000 HC SEMI PRIVATE

## 2023-11-10 PROCEDURE — 6370000000 HC RX 637 (ALT 250 FOR IP): Performed by: FAMILY MEDICINE

## 2023-11-10 PROCEDURE — 6360000002 HC RX W HCPCS: Performed by: INTERNAL MEDICINE

## 2023-11-10 PROCEDURE — 90935 HEMODIALYSIS ONE EVALUATION: CPT

## 2023-11-10 PROCEDURE — 2580000003 HC RX 258: Performed by: INTERNAL MEDICINE

## 2023-11-10 PROCEDURE — 6360000002 HC RX W HCPCS: Performed by: CLINICAL NURSE SPECIALIST

## 2023-11-10 PROCEDURE — 83735 ASSAY OF MAGNESIUM: CPT

## 2023-11-10 PROCEDURE — A4216 STERILE WATER/SALINE, 10 ML: HCPCS

## 2023-11-10 PROCEDURE — A9579 GAD-BASE MR CONTRAST NOS,1ML: HCPCS | Performed by: RADIOLOGY

## 2023-11-10 PROCEDURE — 70553 MRI BRAIN STEM W/O & W/DYE: CPT

## 2023-11-10 PROCEDURE — 6360000002 HC RX W HCPCS

## 2023-11-10 PROCEDURE — 85027 COMPLETE CBC AUTOMATED: CPT

## 2023-11-10 PROCEDURE — 6360000004 HC RX CONTRAST MEDICATION: Performed by: RADIOLOGY

## 2023-11-10 PROCEDURE — 82962 GLUCOSE BLOOD TEST: CPT

## 2023-11-10 PROCEDURE — 2580000003 HC RX 258

## 2023-11-10 PROCEDURE — 36415 COLL VENOUS BLD VENIPUNCTURE: CPT

## 2023-11-10 PROCEDURE — 84100 ASSAY OF PHOSPHORUS: CPT

## 2023-11-10 PROCEDURE — 93010 ELECTROCARDIOGRAM REPORT: CPT | Performed by: INTERNAL MEDICINE

## 2023-11-10 PROCEDURE — C9113 INJ PANTOPRAZOLE SODIUM, VIA: HCPCS

## 2023-11-10 PROCEDURE — 80048 BASIC METABOLIC PNL TOTAL CA: CPT

## 2023-11-10 PROCEDURE — 6360000002 HC RX W HCPCS: Performed by: FAMILY MEDICINE

## 2023-11-10 PROCEDURE — 99232 SBSQ HOSP IP/OBS MODERATE 35: CPT | Performed by: FAMILY MEDICINE

## 2023-11-10 RX ORDER — DIPHENHYDRAMINE HYDROCHLORIDE 50 MG/ML
25 INJECTION INTRAMUSCULAR; INTRAVENOUS PRN
Status: DISCONTINUED | OUTPATIENT
Start: 2023-11-10 | End: 2023-11-12 | Stop reason: HOSPADM

## 2023-11-10 RX ADMIN — PIPERACILLIN AND TAZOBACTAM 4500 MG: 4; .5 INJECTION, POWDER, LYOPHILIZED, FOR SOLUTION INTRAVENOUS at 12:24

## 2023-11-10 RX ADMIN — PANTOPRAZOLE SODIUM 40 MG: 40 INJECTION, POWDER, FOR SOLUTION INTRAVENOUS at 23:07

## 2023-11-10 RX ADMIN — DIVALPROEX SODIUM 250 MG: 125 CAPSULE, COATED PELLETS ORAL at 23:15

## 2023-11-10 RX ADMIN — Medication 10 ML: at 23:08

## 2023-11-10 RX ADMIN — SENNOSIDES 17.2 MG: 8.6 TABLET, FILM COATED ORAL at 12:05

## 2023-11-10 RX ADMIN — DIVALPROEX SODIUM 250 MG: 125 CAPSULE, COATED PELLETS ORAL at 05:07

## 2023-11-10 RX ADMIN — BACLOFEN 10 MG: 10 TABLET ORAL at 12:05

## 2023-11-10 RX ADMIN — MICONAZOLE NITRATE: 20.6 POWDER TOPICAL at 12:10

## 2023-11-10 RX ADMIN — ESCITALOPRAM OXALATE 10 MG: 10 TABLET ORAL at 12:05

## 2023-11-10 RX ADMIN — GADOTERIDOL 20 ML: 279.3 INJECTION, SOLUTION INTRAVENOUS at 06:45

## 2023-11-10 RX ADMIN — GABAPENTIN 100 MG: 100 CAPSULE ORAL at 23:15

## 2023-11-10 RX ADMIN — EPOETIN ALFA-EPBX 6000 UNITS: 3000 INJECTION, SOLUTION INTRAVENOUS; SUBCUTANEOUS at 17:52

## 2023-11-10 RX ADMIN — INSULIN GLARGINE 5 UNITS: 100 INJECTION, SOLUTION SUBCUTANEOUS at 23:05

## 2023-11-10 RX ADMIN — METOPROLOL SUCCINATE 25 MG: 25 TABLET, EXTENDED RELEASE ORAL at 12:04

## 2023-11-10 RX ADMIN — LORAZEPAM 1 MG: 2 INJECTION INTRAMUSCULAR; INTRAVENOUS at 12:06

## 2023-11-10 RX ADMIN — PIPERACILLIN AND TAZOBACTAM 4500 MG: 4; .5 INJECTION, POWDER, LYOPHILIZED, FOR SOLUTION INTRAVENOUS at 23:42

## 2023-11-10 RX ADMIN — DIVALPROEX SODIUM 250 MG: 125 CAPSULE, COATED PELLETS ORAL at 12:04

## 2023-11-10 RX ADMIN — MICONAZOLE NITRATE: 20.6 POWDER TOPICAL at 23:12

## 2023-11-10 RX ADMIN — OXYCODONE AND ACETAMINOPHEN 1 TABLET: 5; 325 TABLET ORAL at 16:42

## 2023-11-10 RX ADMIN — DIPHENHYDRAMINE HYDROCHLORIDE 25 MG: 50 INJECTION, SOLUTION INTRAMUSCULAR; INTRAVENOUS at 10:22

## 2023-11-10 RX ADMIN — Medication 10 ML: at 12:08

## 2023-11-10 RX ADMIN — CALCIUM CARBONATE 500 MG: 500 TABLET, CHEWABLE ORAL at 05:07

## 2023-11-10 RX ADMIN — GABAPENTIN 100 MG: 100 CAPSULE ORAL at 12:04

## 2023-11-10 RX ADMIN — BACLOFEN 10 MG: 10 TABLET ORAL at 23:14

## 2023-11-10 RX ADMIN — PETROLATUM: 420 OINTMENT TOPICAL at 12:10

## 2023-11-10 RX ADMIN — BUSPIRONE HYDROCHLORIDE 10 MG: 5 TABLET ORAL at 09:00

## 2023-11-10 RX ADMIN — PANTOPRAZOLE SODIUM 40 MG: 40 INJECTION, POWDER, FOR SOLUTION INTRAVENOUS at 12:06

## 2023-11-10 RX ADMIN — PETROLATUM: 420 OINTMENT TOPICAL at 23:13

## 2023-11-10 ASSESSMENT — PAIN SCALES - GENERAL: PAINLEVEL_OUTOF10: 0

## 2023-11-10 NOTE — PROGRESS NOTES
Chart reviewed. No acute overnight events. NG tube placed for nutrition. Patient to continue receiving antibiotics and dialysis . To be transferred to [facility] once bed is available. Continue limited code DNR CCA DNI. From previous discussion with family, goal is to continue with current medical treatment. Goal of care and CODE STATUS has been established. Going to sign off for now. Please reconsult if new PM needs arises.

## 2023-11-10 NOTE — PLAN OF CARE
Attempted to see patient however she is off the unit for procedure/testing. There is also no family at bedside. Notes and results reviewed again this morning. Vitals also reviewed--remains hypertensive    Testing:  EEG consistent with moderate metabolic encephalopathy. MRI brain negative for acute pathology    Assessment:  Multiple metabolic derangements including hyponatremia, hypochloremia, PERLA on CKD, and anemia (H&H-- 7.5/23. 8) as well as urine concerning for UTI (+) gram-positive organism in urine culture. Patient also has wounds being followed by ID--- currently on Rocephin    Concern for secondary hyperparathyroidism and pyelonephritis--specialist following    Plan:   Continue supportive care  Better BP control  Correct multiple metabolic derangements  Antibiotics per ID    Neurology will sign off. Patient is being transferred to [facility] once bed available. Please call with additional questions or concerns.

## 2023-11-10 NOTE — PROGRESS NOTES
[facility]  Nephrology Progress Note    Patient's Name: Deng Davila     History OF Present Illness From 11/5 Consult Note: \"The patient is a 75year old female with past medical history of anemia in CKD, ESRD on HD, DM, hyperkalemia, HTN, kidney stones, and wounds. The patient is well known to our practice as we follow her ESRD on hemodialysis at the Dialysis Den at [facility], therefore, a full consult is deferred. She was recently hospitalized at this hospital on 10/31 -11/1 for treatment of nausea and vomiting which resolved. She returned to the ED on 11/3 with altered mental status. CT of the head showed no acute intracranial process. CXR neg for pleural effusion, pneumothorax. VS in the ED:/55, P-64, R 16, T-97.5. Lab data on 11/3 includes Na 135, K 4.5, CO2 33, BUN 36, Cr 5.0, lactic  acid 2.0, calcium 10, albumin 2.9, Hgb 8.8, WBC 16.5. Per the nurse's notes the patient was unresponsive upon arrival to her room last evening. Upon assessment the patient yells out when aroused, does not follow commands or answer questions. Daughter who is a respiratory therapist present and updated. In no respiratory distress. Urinary catheter draining a milky urine. \"        Subjective:    11/10: At present, patient was seen and examined on HD. Yelling out, moaning. Unable to answer questions or follow simple commands. NG in place. MRI of brain completed this   AM. Awaiting transfer to [facility].      Problem List:    Patient Active Problem List   Diagnosis    Neurologic gait dysfunction    Diabetes mellitus ([address])    Hypertension    Arthritis    Knee problem    Anemia due to stage 3 chronic kidney disease    Primary osteoarthritis of both knees    Moderate protein-calorie malnutrition (HCC)    Pressure injury of sacral region, stage 4 (HCC)    Pressure injury of right hip, stage 3 (HCC)    Left bundle branch block    Moderate obesity    Failure to thrive in adult    Pressure injury of sacral region, stage 11/10/2023     Vit D, 25-Hydroxy   Date Value Ref Range Status   11/02/2023 39.1 30.0 - 100.0 ng/mL Final     PTH   Date Value Ref Range Status   11/06/2022 466 (H) 15 - 65 pg/mL Final       No components found for: \"URIC\"    Lab Results   Component Value Date/Time    COLORU Yellow 11/04/2023 06:52 PM    NITRU NEGATIVE 11/04/2023 06:52 PM    GLUCOSEU NEGATIVE 11/04/2023 06:52 PM    GLUCOSEU NEGATIVE 08/17/2011 10:30 PM    KETUA NEGATIVE 11/04/2023 06:52 PM    UROBILINOGEN 0.2 11/04/2023 06:52 PM    BILIRUBINUR NEGATIVE 11/04/2023 06:52 PM    BILIRUBINUR NEGATIVE 08/17/2011 10:30 PM       No results found for: \"LIPIDPAN\"        Assessment and Plans:    ESRD on HD  Outpatient at the dialysis den at [address] [place] [address] via left upper arm AVG. Continue HD TTS while inpatient  HD again today --> plan for 3 consecutive days as MRI was completed this AM  Monitor labs     2. Anemia of CKD  Hemoglobin target 10-12 g/dL  Hg 8.1 11/5 --> 7.5 today  11/6 ARUN added  Transfuse Hg < 7  Monitor H/H     3. Secondary hyperparathyroidism of renal origin    10/9  Calcium 9.5 11/5, phos 3.4 11/4  Calcium 9.0 today, albumin 2.4   Phos 3.9 today  On [facility] outpatient, on TUMs here --> unable to receive d/t NG in place and patient not alert enough to take oral meds  Monitor labs     4. Hypertension in CKD  BP goal < 130/80  BP above goal  Monitor Bps     5. AMS in the setting of sepsis, UTI  11/3 Head CT showed no acute abnormality  MRI brain today  EEG ordered  Hold on lumbar puncture until other tests performed  Neurology following     6. Sepsis, UTI, sacral wound  WBC 16.5 on admission ---> WBC 10.0 today  UA showed large LE, large hgb, too many to count WBC, RBC  Urine culture positive for gram positive organism  Blood cultures negative so far  CXR neg for effusion, pneumothorax  Abx per ID  ID following  Wound care following    At the time of this note, BMP and CBC were not drawn or resulted.     Awaiting transfer to

## 2023-11-10 NOTE — PROGRESS NOTES
Patient seen in dialysis. No changes neurologically. Still yelling out every few seconds. Does not appear to be in any distress. Tachycardic last night. Extra dose of metoprolol given. Appears to be stable now. MRI of the head shows no acute changes. Still unsure etiology of patient's mental status changes since Saturday. Spoke with the daughter Tommy Cabral last night. Patient has been accepted for transfer to [facility]. Awaiting bed. Daughter question whether we should try another facility like  or [facility]. I asked her to give it another 24 hours or so. I explained to her the next facility may not accept her and there is a good chance there is going to be a wait to transfer her there also. Await further recommendations by ID and neuro. The EEG has been done I believe the results are pending. Prognosis for neurological recovery appears to be poor.

## 2023-11-10 NOTE — PROGRESS NOTES
Comprehensive Nutrition Assessment    Type and Reason for Visit:  Reassess (TF)    Nutrition Recommendations/Plan:   Continue current diet; intake remains suboptimal  Recommend modifying TF to better meet pt's est needs ; recommend,    Renal (Nepro 1.8) @ 35ml/hr + 1 pro mod to provide 840ml TV, 1512kcals, 68g pro (w/ 1 pro mod is 1616kcals, 94g pro), 611ml free water. Flush per nephrology. Malnutrition Assessment:  Malnutrition Status: Moderate malnutrition (11/05/23 1314)    Context:  Chronic Illness     Findings of the 6 clinical characteristics of malnutrition:  Energy Intake:  75% or less estimated energy requirements for 1 month or longer  Weight Loss:  Unable to assess     Body Fat Loss:  Mild body fat loss Orbital   Muscle Mass Loss:  Mild muscle mass loss Temples (temporalis), Clavicles (pectoralis & deltoids)  Fluid Accumulation:  No significant fluid accumulation     Strength:  Not Performed    Nutrition Assessment:    pt adm d/t AMS/unresponsive at [facility]; PMhx of DM, ESRD on HD; pt w/ suspect sacral wound infection /sepsis ; pt s/p NGT placement for tube feeds (held on 11/7 d/t drainage); pt tachycardic on 11/9 ; pt w/ AMS; constipation noted; pt anticipating transf to [facility]; will provide TF rec and monitor. Nutrition Related Findings:    nonverbal ; responds to pain; on HD; disoriented x 4; poor dentition; NGT w/ TF; active BS; trace/+2 edema; K/Phos WNL; hyponatremia Wound Type: Multiple, Deep Tissue Injury, Stage III, Stage IV       Current Nutrition Intake & Therapies:    Average Meal Intake: 0%  Average Supplements Intake: None Ordered  ADULT DIET;  Regular  ADULT TUBE FEEDING; Nasogastric; Renal Formula; Continuous; 10; Yes; 10; Q 4 hours; 35; 50; Q 1 hour  Current Tube Feeding (TF) Orders:  Feeding Route: Nasogastric  Formula: Renal Formula  Schedule: Continuous  Feeding Regimen: 35ml/hr  Additives/Modulars: None  Water Flushes: 50 q 1= 1200ml water  Goal TF & Flush Orders Provides: @

## 2023-11-10 NOTE — PLAN OF CARE
EKG performed with no epic order on 11-9-2023 by [facility] staff. Peter served Dr. Coco Starr to place order in epic. Notified Graeme Doyle that no need for repeat at this time with this order.

## 2023-11-10 NOTE — PROGRESS NOTES
Date: 11/10/2023       Patient Name: Riky Hansen  : 1950      MRN: [id number]    Patient currently unavailable for physical therapy services as patient at dialysis.    Will follow up as appropriate    Sarah Hanson PT, DPT  [id number]

## 2023-11-10 NOTE — PROGRESS NOTES
Excellent fam support. Reviewed notes and plans of care. Ansley's major concern was the recent rapid cognitive decline. Family departed prior to the 10p vital signs which now have regressed closer to nl ( bp wnl ; pulse is tachy but less so than previous.  )      ansley poa is carol and her # is on dry erase board: [phone]. Prior perfect S to attending by my predecessor colleague. ansley is amenable / requesting xfer to [facility]. Mahendra Nails is also amenable / requesting xfer back to [facility] for cardiac monitoring in the meantime ( prior to [facility] transfer ) . Mahendra Nails states they have been counseled in the past re considering a move toward a hospice type of of care plan for [facility].. Given out conversation this hilton, I left the tube feeding nutrition run as previously set up. Q 2 h turns. Advised on meds given via ng tube which would aid in Mom's comfort. Emo support to family.

## 2023-11-10 NOTE — PROGRESS NOTES
EEG preliminary report:  Date: 11/8/2023  Indication: Altered mental status  Study: Routine inpatient EEG    Background: Continues counseling with prominent triphasic most prevalent beginning of study with substantial decline in the latter part of the study. These were associated with brief (1 second) pauses. Study with progression to predominantly delta slowing with decreased amplitude compared to the initial portion of stud y with decline in suppression activity as well as decreasing triphasic waves. Respiratory negative discharges for phasic sharp activity observed. No activation procedures performed. Interpretation:  Abnormal EEG with diffuse slowing triphasic waves consistent with moderate metabolic encephalopathy. No epileptiform activity appreciated. Clinical correlation is indicated.      Philly Ferro MD.

## 2023-11-11 ENCOUNTER — APPOINTMENT (OUTPATIENT)
Dept: CT IMAGING | Age: 73
DRG: 871 | End: 2023-11-11
Payer: COMMERCIAL

## 2023-11-11 VITALS
HEIGHT: 61 IN | HEART RATE: 98 BPM | BODY MASS INDEX: 31.63 KG/M2 | RESPIRATION RATE: 16 BRPM | DIASTOLIC BLOOD PRESSURE: 82 MMHG | OXYGEN SATURATION: 100 % | WEIGHT: 167.55 LBS | SYSTOLIC BLOOD PRESSURE: 132 MMHG | TEMPERATURE: 98.3 F

## 2023-11-11 PROBLEM — S72.8X1A OTHER FRACTURE OF RIGHT FEMUR, INITIAL ENCOUNTER FOR CLOSED FRACTURE (HCC): Status: RESOLVED | Noted: 2022-11-04 | Resolved: 2023-11-11

## 2023-11-11 PROBLEM — S52.602A CLOSED FRACTURE OF DISTAL ENDS OF LEFT RADIUS AND ULNA: Status: RESOLVED | Noted: 2022-11-04 | Resolved: 2023-11-11

## 2023-11-11 PROBLEM — S52.502A CLOSED FRACTURE OF DISTAL ENDS OF LEFT RADIUS AND ULNA: Status: RESOLVED | Noted: 2022-11-04 | Resolved: 2023-11-11

## 2023-11-11 PROBLEM — S72.431A CLOSED BICONDYLAR FRACTURE OF DISTAL FEMUR, RIGHT, INITIAL ENCOUNTER (HCC): Status: RESOLVED | Noted: 2022-11-04 | Resolved: 2023-11-11

## 2023-11-11 PROBLEM — S72.421A CLOSED BICONDYLAR FRACTURE OF DISTAL END OF RIGHT FEMUR (HCC): Status: RESOLVED | Noted: 2022-11-04 | Resolved: 2023-11-11

## 2023-11-11 PROBLEM — S72.421A CLOSED BICONDYLAR FRACTURE OF DISTAL FEMUR, RIGHT, INITIAL ENCOUNTER (HCC): Status: RESOLVED | Noted: 2022-11-04 | Resolved: 2023-11-11

## 2023-11-11 PROBLEM — S72.431A CLOSED BICONDYLAR FRACTURE OF DISTAL END OF RIGHT FEMUR (HCC): Status: RESOLVED | Noted: 2022-11-04 | Resolved: 2023-11-11

## 2023-11-11 LAB
ANION GAP SERPL CALCULATED.3IONS-SCNC: 15 MMOL/L (ref 7–16)
BUN SERPL-MCNC: 13 MG/DL (ref 6–23)
CALCIUM SERPL-MCNC: 8.8 MG/DL (ref 8.6–10.2)
CHLORIDE SERPL-SCNC: 91 MMOL/L (ref 98–107)
CO2 SERPL-SCNC: 24 MMOL/L (ref 22–29)
CREAT SERPL-MCNC: 2.7 MG/DL (ref 0.5–1)
ERYTHROCYTE [DISTWIDTH] IN BLOOD BY AUTOMATED COUNT: 14.7 % (ref 11.5–15)
GFR SERPL CREATININE-BSD FRML MDRD: 18 ML/MIN/1.73M2
GLUCOSE BLD-MCNC: 135 MG/DL (ref 74–99)
GLUCOSE BLD-MCNC: 147 MG/DL (ref 74–99)
GLUCOSE BLD-MCNC: 181 MG/DL (ref 74–99)
GLUCOSE BLD-MCNC: 217 MG/DL (ref 74–99)
GLUCOSE SERPL-MCNC: 186 MG/DL (ref 74–99)
HCT VFR BLD AUTO: 21.8 % (ref 34–48)
HGB BLD-MCNC: 6.8 G/DL (ref 11.5–15.5)
MAGNESIUM SERPL-MCNC: 1.9 MG/DL (ref 1.6–2.6)
MCH RBC QN AUTO: 33 PG (ref 26–35)
MCHC RBC AUTO-ENTMCNC: 31.2 G/DL (ref 32–34.5)
MCV RBC AUTO: 105.8 FL (ref 80–99.9)
MICROORGANISM SPEC CULT: NORMAL
MICROORGANISM SPEC CULT: NORMAL
PHOSPHATE SERPL-MCNC: 3.5 MG/DL (ref 2.5–4.5)
PLATELET # BLD AUTO: 220 K/UL (ref 130–450)
PMV BLD AUTO: 10.3 FL (ref 7–12)
POTASSIUM SERPL-SCNC: 3.2 MMOL/L (ref 3.5–5)
RBC # BLD AUTO: 2.06 M/UL (ref 3.5–5.5)
SERVICE CMNT-IMP: NORMAL
SERVICE CMNT-IMP: NORMAL
SODIUM SERPL-SCNC: 130 MMOL/L (ref 132–146)
SPECIMEN DESCRIPTION: NORMAL
SPECIMEN DESCRIPTION: NORMAL
WBC OTHER # BLD: 8.5 K/UL (ref 4.5–11.5)

## 2023-11-11 PROCEDURE — 86923 COMPATIBILITY TEST ELECTRIC: CPT

## 2023-11-11 PROCEDURE — 86900 BLOOD TYPING SEROLOGIC ABO: CPT

## 2023-11-11 PROCEDURE — 90935 HEMODIALYSIS ONE EVALUATION: CPT

## 2023-11-11 PROCEDURE — 84100 ASSAY OF PHOSPHORUS: CPT

## 2023-11-11 PROCEDURE — 36415 COLL VENOUS BLD VENIPUNCTURE: CPT

## 2023-11-11 PROCEDURE — 80048 BASIC METABOLIC PNL TOTAL CA: CPT

## 2023-11-11 PROCEDURE — 6360000002 HC RX W HCPCS

## 2023-11-11 PROCEDURE — P9016 RBC LEUKOCYTES REDUCED: HCPCS

## 2023-11-11 PROCEDURE — A4216 STERILE WATER/SALINE, 10 ML: HCPCS

## 2023-11-11 PROCEDURE — 6370000000 HC RX 637 (ALT 250 FOR IP): Performed by: FAMILY MEDICINE

## 2023-11-11 PROCEDURE — 86850 RBC ANTIBODY SCREEN: CPT

## 2023-11-11 PROCEDURE — 99231 SBSQ HOSP IP/OBS SF/LOW 25: CPT | Performed by: FAMILY MEDICINE

## 2023-11-11 PROCEDURE — C9113 INJ PANTOPRAZOLE SODIUM, VIA: HCPCS

## 2023-11-11 PROCEDURE — 83735 ASSAY OF MAGNESIUM: CPT

## 2023-11-11 PROCEDURE — 70450 CT HEAD/BRAIN W/O DYE: CPT

## 2023-11-11 PROCEDURE — 82962 GLUCOSE BLOOD TEST: CPT

## 2023-11-11 PROCEDURE — 2580000003 HC RX 258

## 2023-11-11 PROCEDURE — 85027 COMPLETE CBC AUTOMATED: CPT

## 2023-11-11 PROCEDURE — 86901 BLOOD TYPING SEROLOGIC RH(D): CPT

## 2023-11-11 PROCEDURE — 2580000003 HC RX 258: Performed by: FAMILY MEDICINE

## 2023-11-11 PROCEDURE — 6360000002 HC RX W HCPCS: Performed by: FAMILY MEDICINE

## 2023-11-11 RX ORDER — DIVALPROEX SODIUM 125 MG/1
250 CAPSULE, COATED PELLETS ORAL EVERY 8 HOURS SCHEDULED
Qty: 60 CAPSULE | Refills: 3 | Status: CANCELLED | DISCHARGE
Start: 2023-11-11

## 2023-11-11 RX ORDER — DIPHENHYDRAMINE HYDROCHLORIDE 50 MG/ML
25 INJECTION INTRAMUSCULAR; INTRAVENOUS PRN
DISCHARGE
Start: 2023-11-11

## 2023-11-11 RX ORDER — INSULIN LISPRO 100 [IU]/ML
0-8 INJECTION, SOLUTION INTRAVENOUS; SUBCUTANEOUS
DISCHARGE
Start: 2023-11-11

## 2023-11-11 RX ORDER — SODIUM CHLORIDE 9 MG/ML
INJECTION, SOLUTION INTRAVENOUS PRN
Status: DISCONTINUED | OUTPATIENT
Start: 2023-11-11 | End: 2023-11-12 | Stop reason: HOSPADM

## 2023-11-11 RX ORDER — INSULIN LISPRO 100 [IU]/ML
0-4 INJECTION, SOLUTION INTRAVENOUS; SUBCUTANEOUS NIGHTLY
DISCHARGE
Start: 2023-11-11

## 2023-11-11 RX ADMIN — INSULIN LISPRO 2 UNITS: 100 INJECTION, SOLUTION INTRAVENOUS; SUBCUTANEOUS at 09:27

## 2023-11-11 RX ADMIN — PETROLATUM: 420 OINTMENT TOPICAL at 20:54

## 2023-11-11 RX ADMIN — GABAPENTIN 100 MG: 100 CAPSULE ORAL at 20:52

## 2023-11-11 RX ADMIN — OXYCODONE AND ACETAMINOPHEN 1 TABLET: 5; 325 TABLET ORAL at 16:52

## 2023-11-11 RX ADMIN — CALCIUM CARBONATE 500 MG: 500 TABLET, CHEWABLE ORAL at 16:52

## 2023-11-11 RX ADMIN — SENNOSIDES 17.2 MG: 8.6 TABLET, FILM COATED ORAL at 20:52

## 2023-11-11 RX ADMIN — Medication 10 ML: at 09:29

## 2023-11-11 RX ADMIN — BACLOFEN 10 MG: 10 TABLET ORAL at 09:23

## 2023-11-11 RX ADMIN — OXYCODONE AND ACETAMINOPHEN 1 TABLET: 5; 325 TABLET ORAL at 20:52

## 2023-11-11 RX ADMIN — METOPROLOL SUCCINATE 25 MG: 25 TABLET, EXTENDED RELEASE ORAL at 09:23

## 2023-11-11 RX ADMIN — BUSPIRONE HYDROCHLORIDE 10 MG: 5 TABLET ORAL at 09:23

## 2023-11-11 RX ADMIN — DIVALPROEX SODIUM 250 MG: 125 CAPSULE, COATED PELLETS ORAL at 16:52

## 2023-11-11 RX ADMIN — PETROLATUM: 420 OINTMENT TOPICAL at 09:28

## 2023-11-11 RX ADMIN — MICONAZOLE NITRATE: 20.6 POWDER TOPICAL at 09:28

## 2023-11-11 RX ADMIN — Medication 10 ML: at 20:53

## 2023-11-11 RX ADMIN — DIVALPROEX SODIUM 250 MG: 125 CAPSULE, COATED PELLETS ORAL at 06:09

## 2023-11-11 RX ADMIN — BACLOFEN 10 MG: 10 TABLET ORAL at 16:52

## 2023-11-11 RX ADMIN — DIVALPROEX SODIUM 250 MG: 125 CAPSULE, COATED PELLETS ORAL at 20:52

## 2023-11-11 RX ADMIN — GABAPENTIN 100 MG: 100 CAPSULE ORAL at 09:23

## 2023-11-11 RX ADMIN — ESCITALOPRAM OXALATE 10 MG: 10 TABLET ORAL at 09:23

## 2023-11-11 RX ADMIN — GABAPENTIN 100 MG: 100 CAPSULE ORAL at 16:51

## 2023-11-11 RX ADMIN — MICONAZOLE NITRATE: 20.6 POWDER TOPICAL at 20:54

## 2023-11-11 RX ADMIN — BACLOFEN 10 MG: 10 TABLET ORAL at 20:53

## 2023-11-11 RX ADMIN — OXYCODONE AND ACETAMINOPHEN 1 TABLET: 5; 325 TABLET ORAL at 00:40

## 2023-11-11 RX ADMIN — PANTOPRAZOLE SODIUM 40 MG: 40 INJECTION, POWDER, FOR SOLUTION INTRAVENOUS at 09:28

## 2023-11-11 RX ADMIN — LORAZEPAM 1 MG: 2 INJECTION INTRAMUSCULAR; INTRAVENOUS at 20:53

## 2023-11-11 RX ADMIN — SENNOSIDES 17.2 MG: 8.6 TABLET, FILM COATED ORAL at 09:28

## 2023-11-11 ASSESSMENT — PAIN SCALES - GENERAL
PAINLEVEL_OUTOF10: 8
PAINLEVEL_OUTOF10: 0

## 2023-11-11 ASSESSMENT — PAIN - FUNCTIONAL ASSESSMENT
PAIN_FUNCTIONAL_ASSESSMENT: PREVENTS OR INTERFERES SOME ACTIVE ACTIVITIES AND ADLS
PAIN_FUNCTIONAL_ASSESSMENT: PREVENTS OR INTERFERES WITH MANY ACTIVE NOT PASSIVE ACTIVITIES

## 2023-11-11 ASSESSMENT — PAIN DESCRIPTION - DESCRIPTORS
DESCRIPTORS: PATIENT UNABLE TO DESCRIBE
DESCRIPTORS: OTHER (COMMENT)

## 2023-11-11 ASSESSMENT — PAIN DESCRIPTION - LOCATION
LOCATION: GENERALIZED
LOCATION: OTHER (COMMENT)
LOCATION: GENERALIZED

## 2023-11-11 ASSESSMENT — PAIN SCALES - WONG BAKER: WONGBAKER_NUMERICALRESPONSE: 0

## 2023-11-11 NOTE — PROGRESS NOTES
Family concerned about having some facial drooping by the mouth asking for CT, notified Dr Amol Bales new orders for CT of head w/o contrast.

## 2023-11-11 NOTE — PROGRESS NOTES
[facility]  Nephrology Progress Note    Patient's Name: Asif Swain     History OF Present Illness From 11/5 Consult Note: \"The patient is a 75year old female with past medical history of anemia in CKD, ESRD on HD, DM, hyperkalemia, HTN, kidney stones, and wounds. The patient is well known to our practice as we follow her ESRD on hemodialysis at the Dialysis Den at [facility], therefore, a full consult is deferred. She was recently hospitalized at this hospital on 10/31 -11/1 for treatment of nausea and vomiting which resolved. She returned to the ED on 11/3 with altered mental status. CT of the head showed no acute intracranial process. CXR neg for pleural effusion, pneumothorax. VS in the ED:/55, P-64, R 16, T-97.5. Lab data on 11/3 includes Na 135, K 4.5, CO2 33, BUN 36, Cr 5.0, lactic  acid 2.0, calcium 10, albumin 2.9, Hgb 8.8, WBC 16.5. Per the nurse's notes the patient was unresponsive upon arrival to her room last evening. Upon assessment the patient yells out when aroused, does not follow commands or answer questions. Daughter who is a respiratory therapist present and updated. In no respiratory distress. Urinary catheter draining a milky urine. \"        Subjective:    11/10: At present, patient was seen and examined on HD. Yelling out, moaning. Unable to answer questions or follow simple commands. NG in place. MRI of brain completed this   AM. Awaiting transfer to [facility].     11/11: pt seen and examined, awaits ct for facial droop. Awaits tx to [facility].  Sp hd yesterday with 831 ml off    Problem List:    Patient Active Problem List   Diagnosis    Neurologic gait dysfunction    Diabetes mellitus ([address])    Hypertension    Arthritis    Knee problem    Anemia due to stage 3 chronic kidney disease    Primary osteoarthritis of both knees    Moderate protein-calorie malnutrition (HCC)    Pressure injury of sacral region, stage 4 (HCC)    Pressure injury of right hip, stage 3 (HCC)    Left

## 2023-11-11 NOTE — PROGRESS NOTES
Spoke with Fernando Sung from [email or web address] clinic they have a bed G 100 Bed 15. Nurse to nurse given to Nathalie Carrillo on G [phone]). Updated Dr Vinny Pacheco and daughter Corinne Caster. Also daughter Corinne Caster was thinking about changing code status, Dr Vinny Pacheco and CC updated with that information as well.

## 2023-11-11 NOTE — PLAN OF CARE
Problem: Chronic Conditions and Co-morbidities  Goal: Patient's chronic conditions and co-morbidity symptoms are monitored and maintained or improved  Outcome: Progressing     Problem: Discharge Planning  Goal: Discharge to home or other facility with appropriate resources  Outcome: Progressing     Problem: Safety - Adult  Goal: Free from fall injury  Outcome: Progressing  Flowsheets (Taken 11/10/2023 1928)  Free From Fall Injury: Instruct family/caregiver on patient safety     Problem: Pain  Goal: Verbalizes/displays adequate comfort level or baseline comfort level  Outcome: Progressing     Problem: Skin/Tissue Integrity  Goal: Absence of new skin breakdown  Description: 1. Monitor for areas of redness and/or skin breakdown  2. Assess vascular access sites hourly  3. Every 4-6 hours minimum:  Change oxygen saturation probe site  4. Every 4-6 hours:  If on nasal continuous positive airway pressure, respiratory therapy assess nares and determine need for appliance change or resting period.   Outcome: Progressing     Problem: Nutrition Deficit:  Goal: Optimize nutritional status  Outcome: Progressing

## 2023-11-11 NOTE — PROGRESS NOTES
Eli from access center called & said [facility] will p/u around 940 pm to go to [address] [facility].

## 2023-11-11 NOTE — PROGRESS NOTES
Pts HgB 6.8 called son Vanessa Vargas for informed consent, Fawn Lan as witness. Notified Dr Sanchez Skill of consent being signed. New orders to transfuse 1 unit.

## 2023-11-12 LAB
ABO/RH: NORMAL
ANTIBODY SCREEN: NEGATIVE
ARM BAND NUMBER: NORMAL
BLOOD BANK BLOOD PRODUCT EXPIRATION DATE: NORMAL
BLOOD BANK DISPENSE STATUS: NORMAL
BLOOD BANK ISBT PRODUCT BLOOD TYPE: 9500
BLOOD BANK PRODUCT CODE: NORMAL
BLOOD BANK SAMPLE EXPIRATION: NORMAL
BLOOD BANK UNIT TYPE AND RH: NORMAL
BPU ID: NORMAL
COMPONENT: NORMAL
CROSSMATCH RESULT: NORMAL
TRANSFUSION STATUS: NORMAL
UNIT DIVISION: 0
UNIT ISSUE DATE/TIME: NORMAL

## 2023-11-12 NOTE — PROGRESS NOTES
Patient daughter Merlinda Elder called and notified her mom has been picked up for transport to [facility].

## 2024-01-02 ENCOUNTER — APPOINTMENT (OUTPATIENT)
Dept: GENERAL RADIOLOGY | Age: 74
DRG: 312 | End: 2024-01-02
Payer: MEDICARE

## 2024-01-02 ENCOUNTER — HOSPITAL ENCOUNTER (INPATIENT)
Age: 74
LOS: 6 days | Discharge: NURSING, REHAB OR LONG-TERM CARE | DRG: 312 | End: 2024-01-08
Attending: EMERGENCY MEDICINE | Admitting: FAMILY MEDICINE
Payer: MEDICARE

## 2024-01-02 DIAGNOSIS — R55 SYNCOPE AND COLLAPSE: ICD-10-CM

## 2024-01-02 DIAGNOSIS — A41.9 SEPTICEMIA (HCC): Primary | ICD-10-CM

## 2024-01-02 DIAGNOSIS — M25.9 KNEE PROBLEM: ICD-10-CM

## 2024-01-02 DIAGNOSIS — M19.90 ARTHRITIS: ICD-10-CM

## 2024-01-02 DIAGNOSIS — T68.XXXA HYPOTHERMIA, INITIAL ENCOUNTER: ICD-10-CM

## 2024-01-02 DIAGNOSIS — N39.0 URINARY TRACT INFECTION WITHOUT HEMATURIA, SITE UNSPECIFIED: ICD-10-CM

## 2024-01-02 DIAGNOSIS — L89.154 PRESSURE INJURY OF SACRAL REGION, STAGE 4 (HCC): ICD-10-CM

## 2024-01-02 LAB
ALBUMIN SERPL-MCNC: 2.1 G/DL (ref 3.5–5.2)
ALP SERPL-CCNC: 111 U/L (ref 35–104)
ALT SERPL-CCNC: 42 U/L (ref 0–32)
ANION GAP SERPL CALCULATED.3IONS-SCNC: 12 MMOL/L (ref 7–16)
AST SERPL-CCNC: 70 U/L (ref 0–31)
BACTERIA URNS QL MICRO: ABNORMAL
BASOPHILS # BLD: 0.03 K/UL (ref 0–0.2)
BASOPHILS NFR BLD: 0 % (ref 0–2)
BILIRUB SERPL-MCNC: <0.2 MG/DL (ref 0–1.2)
BILIRUB UR QL STRIP: NEGATIVE
BUN SERPL-MCNC: 22 MG/DL (ref 6–23)
CALCIUM SERPL-MCNC: 9.2 MG/DL (ref 8.6–10.2)
CHLORIDE SERPL-SCNC: 93 MMOL/L (ref 98–107)
CLARITY UR: CLEAR
CO2 SERPL-SCNC: 29 MMOL/L (ref 22–29)
COLOR UR: YELLOW
CREAT SERPL-MCNC: 3.3 MG/DL (ref 0.5–1)
EOSINOPHIL # BLD: 0.18 K/UL (ref 0.05–0.5)
EOSINOPHILS RELATIVE PERCENT: 2 % (ref 0–6)
EPI CELLS #/AREA URNS HPF: ABNORMAL /HPF
ERYTHROCYTE [DISTWIDTH] IN BLOOD BY AUTOMATED COUNT: 16.1 % (ref 11.5–15)
GFR SERPL CREATININE-BSD FRML MDRD: 14 ML/MIN/1.73M2
GLUCOSE SERPL-MCNC: 124 MG/DL (ref 74–99)
GLUCOSE UR STRIP-MCNC: 100 MG/DL
HCT VFR BLD AUTO: 24.4 % (ref 34–48)
HGB BLD-MCNC: 7.3 G/DL (ref 11.5–15.5)
HGB UR QL STRIP.AUTO: ABNORMAL
IMM GRANULOCYTES # BLD AUTO: 0.1 K/UL (ref 0–0.58)
IMM GRANULOCYTES NFR BLD: 1 % (ref 0–5)
INFLUENZA A BY PCR: NOT DETECTED
INFLUENZA B BY PCR: NOT DETECTED
KETONES UR STRIP-MCNC: NEGATIVE MG/DL
LACTATE BLDV-SCNC: 3.5 MMOL/L (ref 0.5–1.9)
LACTATE BLDV-SCNC: 6.5 MMOL/L (ref 0.5–1.9)
LEUKOCYTE ESTERASE UR QL STRIP: ABNORMAL
LYMPHOCYTES NFR BLD: 0.79 K/UL (ref 1.5–4)
LYMPHOCYTES RELATIVE PERCENT: 10 % (ref 20–42)
MAGNESIUM SERPL-MCNC: 1.9 MG/DL (ref 1.6–2.6)
MCH RBC QN AUTO: 32 PG (ref 26–35)
MCHC RBC AUTO-ENTMCNC: 29.9 G/DL (ref 32–34.5)
MCV RBC AUTO: 107 FL (ref 80–99.9)
MONOCYTES NFR BLD: 0.67 K/UL (ref 0.1–0.95)
MONOCYTES NFR BLD: 8 % (ref 2–12)
NEUTROPHILS NFR BLD: 78 % (ref 43–80)
NEUTS SEG NFR BLD: 6.22 K/UL (ref 1.8–7.3)
NITRITE UR QL STRIP: POSITIVE
PH UR STRIP: 8.5 [PH] (ref 5–9)
PLATELET # BLD AUTO: 250 K/UL (ref 130–450)
PMV BLD AUTO: 9.4 FL (ref 7–12)
POTASSIUM SERPL-SCNC: 4 MMOL/L (ref 3.5–5)
PROT SERPL-MCNC: 5.8 G/DL (ref 6.4–8.3)
PROT UR STRIP-MCNC: 100 MG/DL
RBC # BLD AUTO: 2.28 M/UL (ref 3.5–5.5)
RBC #/AREA URNS HPF: ABNORMAL /HPF
SARS-COV-2 RDRP RESP QL NAA+PROBE: NOT DETECTED
SODIUM SERPL-SCNC: 134 MMOL/L (ref 132–146)
SP GR UR STRIP: 1.01 (ref 1–1.03)
SPECIMEN DESCRIPTION: NORMAL
TROPONIN I SERPL HS-MCNC: 100 NG/L (ref 0–9)
TROPONIN I SERPL HS-MCNC: 100 NG/L (ref 0–9)
UROBILINOGEN UR STRIP-ACNC: 0.2 EU/DL (ref 0–1)
WBC #/AREA URNS HPF: ABNORMAL /HPF
WBC OTHER # BLD: 8 K/UL (ref 4.5–11.5)

## 2024-01-02 PROCEDURE — 87502 INFLUENZA DNA AMP PROBE: CPT

## 2024-01-02 PROCEDURE — 84484 ASSAY OF TROPONIN QUANT: CPT

## 2024-01-02 PROCEDURE — 87040 BLOOD CULTURE FOR BACTERIA: CPT

## 2024-01-02 PROCEDURE — 87635 SARS-COV-2 COVID-19 AMP PRB: CPT

## 2024-01-02 PROCEDURE — 6360000002 HC RX W HCPCS: Performed by: EMERGENCY MEDICINE

## 2024-01-02 PROCEDURE — 93005 ELECTROCARDIOGRAM TRACING: CPT | Performed by: EMERGENCY MEDICINE

## 2024-01-02 PROCEDURE — 96374 THER/PROPH/DIAG INJ IV PUSH: CPT

## 2024-01-02 PROCEDURE — 85025 COMPLETE CBC W/AUTO DIFF WBC: CPT

## 2024-01-02 PROCEDURE — 71045 X-RAY EXAM CHEST 1 VIEW: CPT

## 2024-01-02 PROCEDURE — 1200000000 HC SEMI PRIVATE

## 2024-01-02 PROCEDURE — 81001 URINALYSIS AUTO W/SCOPE: CPT

## 2024-01-02 PROCEDURE — 83605 ASSAY OF LACTIC ACID: CPT

## 2024-01-02 PROCEDURE — 2580000003 HC RX 258: Performed by: EMERGENCY MEDICINE

## 2024-01-02 PROCEDURE — 99285 EMERGENCY DEPT VISIT HI MDM: CPT

## 2024-01-02 PROCEDURE — 83735 ASSAY OF MAGNESIUM: CPT

## 2024-01-02 PROCEDURE — 87086 URINE CULTURE/COLONY COUNT: CPT

## 2024-01-02 PROCEDURE — 80053 COMPREHEN METABOLIC PANEL: CPT

## 2024-01-02 RX ORDER — 0.9 % SODIUM CHLORIDE 0.9 %
250 INTRAVENOUS SOLUTION INTRAVENOUS ONCE
Status: COMPLETED | OUTPATIENT
Start: 2024-01-02 | End: 2024-01-02

## 2024-01-02 RX ORDER — 0.9 % SODIUM CHLORIDE 0.9 %
500 INTRAVENOUS SOLUTION INTRAVENOUS ONCE
Status: COMPLETED | OUTPATIENT
Start: 2024-01-02 | End: 2024-01-02

## 2024-01-02 RX ADMIN — WATER 1000 MG: 1 INJECTION INTRAMUSCULAR; INTRAVENOUS; SUBCUTANEOUS at 19:46

## 2024-01-02 RX ADMIN — SODIUM CHLORIDE 500 ML: 9 INJECTION, SOLUTION INTRAVENOUS at 19:45

## 2024-01-02 RX ADMIN — SODIUM CHLORIDE 250 ML: 9 INJECTION, SOLUTION INTRAVENOUS at 18:43

## 2024-01-02 NOTE — ED PROVIDER NOTES
[facility] EMERGENCY DEPARTMENT  EMERGENCY DEPARTMENT ENCOUNTER        Pt Name: Marisela Mcguire  MRN: [id number]  Birthdate 1950  Date of evaluation: 2024  Provider: Ro Barrios DO  PCP: Rj Casillas MD  Note Started: 5:39 PM EST 24    CHIEF COMPLAINT       Chief Complaint   Patient presents with    unresponsive     While at Dialysis. Per EMS, patient received full treatment. Hx of dementia and at baseline per EMS.       HISTORY OF PRESENT ILLNESS: 1 or more Elements   History From: Patient and EMS    Limitations to history : None    Marisela Mcguire is a 73 y.o. female who presents with concern for syncopal event while dialysis earlier day today.  She did have a full treatment, she came her right from there.  She has a history of dementia and has been at her baseline since then.  She is currently asymptomatic, awake alert not her baseline, no chest pain, no headaches, no vision changes, no numbness, weakness, tingling from her baseline.  She does have a wound on her left breast that she is being treated for.  No other associations.    REVIEW OF SYSTEMS :      Positives and Pertinent negatives as per HPI.     SURGICAL HISTORY     Past Surgical History:   Procedure Laterality Date    ABDOMEN SURGERY N/A 2020    SACRAL WOUND DEBRIDEMENT CALL DRWatson WITH TIME AVAIL AM performed by Jared Muñoz MD at [facility] OR     SECTION  x3    CHOLECYSTECTOMY  3/31/16    Laparoscopic-Dr. Muñoz    CYSTOSCOPY       for kidney stones    DIALYSIS FISTULA CREATION Left 2021    INSERTION FISTULA LEFT ARM performed by Lam Nichols MD at [facility] OR    DIALYSIS FISTULA CREATION Right 2021    REVISION AV FISTULA LEFT ARM performed by Lam Nichols MD at [facility] OR    FEMUR FRACTURE SURGERY Right 2022    RIGHT DISTAL FEMUR OPEN REDUCTION INTERNAL FIXATION ++SYNTHES++ performed by Corona Orta MD at [facility] OR    FOOT SURGERY       right     UPPER

## 2024-01-03 PROBLEM — T68.XXXA HYPOTHERMIA: Status: ACTIVE | Noted: 2024-01-03

## 2024-01-03 PROBLEM — N39.0 SEPSIS SECONDARY TO UTI (HCC): Status: ACTIVE | Noted: 2024-01-02

## 2024-01-03 LAB
GLUCOSE BLD-MCNC: 173 MG/DL (ref 74–99)
LACTATE BLDV-SCNC: 1.8 MMOL/L (ref 0.5–2.2)

## 2024-01-03 PROCEDURE — 86317 IMMUNOASSAY INFECTIOUS AGENT: CPT

## 2024-01-03 PROCEDURE — 6370000000 HC RX 637 (ALT 250 FOR IP): Performed by: FAMILY MEDICINE

## 2024-01-03 PROCEDURE — 1200000000 HC SEMI PRIVATE

## 2024-01-03 PROCEDURE — 87340 HEPATITIS B SURFACE AG IA: CPT

## 2024-01-03 PROCEDURE — 6360000002 HC RX W HCPCS: Performed by: FAMILY MEDICINE

## 2024-01-03 PROCEDURE — 83605 ASSAY OF LACTIC ACID: CPT

## 2024-01-03 PROCEDURE — 99222 1ST HOSP IP/OBS MODERATE 55: CPT | Performed by: FAMILY MEDICINE

## 2024-01-03 PROCEDURE — 2580000003 HC RX 258: Performed by: FAMILY MEDICINE

## 2024-01-03 PROCEDURE — 82962 GLUCOSE BLOOD TEST: CPT

## 2024-01-03 RX ORDER — ESCITALOPRAM OXALATE 10 MG/1
10 TABLET ORAL DAILY
Status: DISCONTINUED | OUTPATIENT
Start: 2024-01-03 | End: 2024-01-06

## 2024-01-03 RX ORDER — PROCHLORPERAZINE MALEATE 10 MG
10 TABLET ORAL EVERY 8 HOURS PRN
Status: DISCONTINUED | OUTPATIENT
Start: 2024-01-03 | End: 2024-01-08 | Stop reason: HOSPADM

## 2024-01-03 RX ORDER — CALCIUM CARBONATE 500 MG/1
1 TABLET, CHEWABLE ORAL
Status: ON HOLD | COMMUNITY

## 2024-01-03 RX ORDER — AMITRIPTYLINE HYDROCHLORIDE 75 MG/1
75 TABLET ORAL NIGHTLY
COMMUNITY
End: 2024-01-03 | Stop reason: ALTCHOICE

## 2024-01-03 RX ORDER — PROCHLORPERAZINE EDISYLATE 5 MG/ML
10 INJECTION INTRAMUSCULAR; INTRAVENOUS EVERY 6 HOURS PRN
Status: DISCONTINUED | OUTPATIENT
Start: 2024-01-03 | End: 2024-01-08 | Stop reason: HOSPADM

## 2024-01-03 RX ORDER — SODIUM CHLORIDE 0.9 % (FLUSH) 0.9 %
5-40 SYRINGE (ML) INJECTION PRN
Status: DISCONTINUED | OUTPATIENT
Start: 2024-01-03 | End: 2024-01-08 | Stop reason: HOSPADM

## 2024-01-03 RX ORDER — FOLIC ACID/VIT B COMPLEX AND C 0.8 MG
0.8 TABLET ORAL DAILY
COMMUNITY
End: 2024-01-03 | Stop reason: ALTCHOICE

## 2024-01-03 RX ORDER — ACETAMINOPHEN 325 MG/1
650 TABLET ORAL EVERY 6 HOURS PRN
Status: DISCONTINUED | OUTPATIENT
Start: 2024-01-03 | End: 2024-01-08 | Stop reason: HOSPADM

## 2024-01-03 RX ORDER — HEPARIN SODIUM 10000 [USP'U]/ML
5000 INJECTION, SOLUTION INTRAVENOUS; SUBCUTANEOUS EVERY 8 HOURS SCHEDULED
Status: DISCONTINUED | OUTPATIENT
Start: 2024-01-03 | End: 2024-01-08 | Stop reason: HOSPADM

## 2024-01-03 RX ORDER — INSULIN LISPRO 100 [IU]/ML
4 INJECTION, SOLUTION INTRAVENOUS; SUBCUTANEOUS
Status: ON HOLD | COMMUNITY

## 2024-01-03 RX ORDER — DEXTROSE MONOHYDRATE 100 MG/ML
INJECTION, SOLUTION INTRAVENOUS CONTINUOUS PRN
Status: DISCONTINUED | OUTPATIENT
Start: 2024-01-03 | End: 2024-01-08 | Stop reason: HOSPADM

## 2024-01-03 RX ORDER — INSULIN LISPRO 100 [IU]/ML
0-4 INJECTION, SOLUTION INTRAVENOUS; SUBCUTANEOUS NIGHTLY
Status: DISCONTINUED | OUTPATIENT
Start: 2024-01-03 | End: 2024-01-08 | Stop reason: HOSPADM

## 2024-01-03 RX ORDER — POLYETHYLENE GLYCOL 3350 17 G/17G
17 POWDER, FOR SOLUTION ORAL DAILY PRN
Status: DISCONTINUED | OUTPATIENT
Start: 2024-01-03 | End: 2024-01-08 | Stop reason: HOSPADM

## 2024-01-03 RX ORDER — MIDODRINE HYDROCHLORIDE 5 MG/1
15 TABLET ORAL
Status: DISCONTINUED | OUTPATIENT
Start: 2024-01-04 | End: 2024-01-08 | Stop reason: HOSPADM

## 2024-01-03 RX ORDER — ONDANSETRON 2 MG/ML
4 INJECTION INTRAMUSCULAR; INTRAVENOUS EVERY 6 HOURS PRN
Status: DISCONTINUED | OUTPATIENT
Start: 2024-01-03 | End: 2024-01-03 | Stop reason: ALTCHOICE

## 2024-01-03 RX ORDER — BISACODYL 10 MG
10 SUPPOSITORY, RECTAL RECTAL DAILY PRN
Status: DISCONTINUED | OUTPATIENT
Start: 2024-01-03 | End: 2024-01-08 | Stop reason: HOSPADM

## 2024-01-03 RX ORDER — LACTULOSE 10 G/15ML
10 SOLUTION ORAL DAILY PRN
Status: DISCONTINUED | OUTPATIENT
Start: 2024-01-03 | End: 2024-01-08 | Stop reason: HOSPADM

## 2024-01-03 RX ORDER — POLYETHYLENE GLYCOL 3350 17 G/17G
17 POWDER, FOR SOLUTION ORAL DAILY PRN
Status: DISCONTINUED | OUTPATIENT
Start: 2024-01-03 | End: 2024-01-03 | Stop reason: SDUPTHER

## 2024-01-03 RX ORDER — SODIUM CHLORIDE 9 MG/ML
INJECTION, SOLUTION INTRAVENOUS PRN
Status: DISCONTINUED | OUTPATIENT
Start: 2024-01-03 | End: 2024-01-08 | Stop reason: HOSPADM

## 2024-01-03 RX ORDER — SODIUM CHLORIDE 0.9 % (FLUSH) 0.9 %
5-40 SYRINGE (ML) INJECTION EVERY 12 HOURS SCHEDULED
Status: DISCONTINUED | OUTPATIENT
Start: 2024-01-03 | End: 2024-01-08 | Stop reason: HOSPADM

## 2024-01-03 RX ORDER — ACETAMINOPHEN 650 MG/1
650 SUPPOSITORY RECTAL EVERY 6 HOURS PRN
Status: DISCONTINUED | OUTPATIENT
Start: 2024-01-03 | End: 2024-01-08 | Stop reason: HOSPADM

## 2024-01-03 RX ORDER — METOPROLOL SUCCINATE 25 MG/1
25 TABLET, EXTENDED RELEASE ORAL DAILY
Status: DISCONTINUED | OUTPATIENT
Start: 2024-01-03 | End: 2024-01-08 | Stop reason: HOSPADM

## 2024-01-03 RX ORDER — INSULIN LISPRO 100 [IU]/ML
0-8 INJECTION, SOLUTION INTRAVENOUS; SUBCUTANEOUS
Status: DISCONTINUED | OUTPATIENT
Start: 2024-01-03 | End: 2024-01-08 | Stop reason: HOSPADM

## 2024-01-03 RX ORDER — ONDANSETRON 4 MG/1
4 TABLET, ORALLY DISINTEGRATING ORAL EVERY 8 HOURS PRN
Status: DISCONTINUED | OUTPATIENT
Start: 2024-01-03 | End: 2024-01-03 | Stop reason: ALTCHOICE

## 2024-01-03 RX ORDER — SENNOSIDES A AND B 8.6 MG/1
2 TABLET, FILM COATED ORAL 2 TIMES DAILY
Status: DISCONTINUED | OUTPATIENT
Start: 2024-01-03 | End: 2024-01-08 | Stop reason: HOSPADM

## 2024-01-03 RX ADMIN — Medication 10 ML: at 10:15

## 2024-01-03 RX ADMIN — HEPARIN SODIUM 5000 UNITS: 10000 INJECTION INTRAVENOUS; SUBCUTANEOUS at 17:01

## 2024-01-03 RX ADMIN — SENNOSIDES 17.2 MG: 8.6 TABLET, COATED ORAL at 10:15

## 2024-01-03 RX ADMIN — METOPROLOL SUCCINATE 25 MG: 25 TABLET, EXTENDED RELEASE ORAL at 10:15

## 2024-01-03 RX ADMIN — HEPARIN SODIUM 5000 UNITS: 10000 INJECTION INTRAVENOUS; SUBCUTANEOUS at 08:00

## 2024-01-03 NOTE — H&P
[place] [address]                              HISTORY AND PHYSICAL    PATIENT NAME: FLAKITA IYER                      :        1950  MED REC NO:   [id number]                            ROOM:       10  ACCOUNT NO:   [id number]                           ADMIT DATE: 2024  PROVIDER:     Rj Casillas MD    CHIEF COMPLAINT:  Syncope, probable orthostatic hypotension.  Rule out  sepsis secondary to UTI.    HISTORY OF PRESENT ILLNESS:  This is a 73-year-old was sent over from  dialysis after I believe she had a syncopal episode during dialysis.  Of  note, she does take midodrine on the days that she has dialysis due to  history of orthostatic hypotension; however, urine looked infected.   Lactic acid level was initially high at 6.5.  The patient is admitted  for possible sepsis.  At present, the patient is awake and alert.  She  knows me, knows where she is, and she does not have any specific  complaints at this point.    RECENT AND PRESENT MEDICATIONS:  See med rec in the chart.    PAST MEDICAL HISTORY:  Multiple admissions for altered mental status,  for dialysis, hypotension, and sacral decubiti.  She also has a history  of diabetes, hypertension, orthostatic hypotension as mentioned, altered  mental status, and probable dementia.    SOCIAL HISTORY:  Does not drink.  Does not smoke.    FAMILY MEDICAL HISTORY:  Noncontributory.    ALLERGIES:  None known.    REVIEW OF SYSTEMS:  SKIN AND LYMPHATICS:  She has a chronic sacral decubiti, which she has  had for many years.  NEUROLOGICALLY:  Previous admissions for altered mental status.  At this  point she denies any headache or blurred vision.  Denies any confusion.  CIRCULATORY:  Denies chest pain, orthopnea, or PND.  DIGESTIVE:  Denies nausea, vomiting, diarrhea, melena, or hematochezia.  GENITOURINARY:  She has got _____ hemodialysis.  Denies dysuria

## 2024-01-04 PROBLEM — A41.9 SEPTICEMIA (HCC): Status: RESOLVED | Noted: 2024-01-02 | Resolved: 2024-01-04

## 2024-01-04 PROBLEM — N39.0 SEPSIS SECONDARY TO UTI (HCC): Status: RESOLVED | Noted: 2024-01-02 | Resolved: 2024-01-04

## 2024-01-04 PROBLEM — T68.XXXA HYPOTHERMIA: Status: RESOLVED | Noted: 2024-01-03 | Resolved: 2024-01-04

## 2024-01-04 PROBLEM — A41.9 SEPSIS SECONDARY TO UTI (HCC): Status: RESOLVED | Noted: 2024-01-02 | Resolved: 2024-01-04

## 2024-01-04 PROBLEM — N39.0 URINARY TRACT INFECTION WITHOUT HEMATURIA: Status: RESOLVED | Noted: 2022-11-25 | Resolved: 2024-01-04

## 2024-01-04 LAB
ALBUMIN SERPL-MCNC: 2 G/DL (ref 3.5–5.2)
ALP SERPL-CCNC: 107 U/L (ref 35–104)
ALT SERPL-CCNC: 37 U/L (ref 0–32)
ANION GAP SERPL CALCULATED.3IONS-SCNC: 11 MMOL/L (ref 7–16)
AST SERPL-CCNC: 55 U/L (ref 0–31)
BASOPHILS # BLD: 0.04 K/UL (ref 0–0.2)
BASOPHILS NFR BLD: 1 % (ref 0–2)
BILIRUB SERPL-MCNC: 0.2 MG/DL (ref 0–1.2)
BUN SERPL-MCNC: 30 MG/DL (ref 6–23)
CALCIUM SERPL-MCNC: 8.9 MG/DL (ref 8.6–10.2)
CHLORIDE SERPL-SCNC: 97 MMOL/L (ref 98–107)
CO2 SERPL-SCNC: 30 MMOL/L (ref 22–29)
CREAT SERPL-MCNC: 4.9 MG/DL (ref 0.5–1)
EKG ATRIAL RATE: 94 BPM
EKG Q-T INTERVAL: 432 MS
EKG QRS DURATION: 156 MS
EKG QTC CALCULATION (BAZETT): 573 MS
EKG R AXIS: -15 DEGREES
EKG T AXIS: 135 DEGREES
EKG VENTRICULAR RATE: 106 BPM
EOSINOPHIL # BLD: 0.19 K/UL (ref 0.05–0.5)
EOSINOPHILS RELATIVE PERCENT: 3 % (ref 0–6)
ERYTHROCYTE [DISTWIDTH] IN BLOOD BY AUTOMATED COUNT: 15.9 % (ref 11.5–15)
FERRITIN SERPL-MCNC: 2301 NG/ML
GFR SERPL CREATININE-BSD FRML MDRD: 9 ML/MIN/1.73M2
GLUCOSE BLD-MCNC: 108 MG/DL (ref 74–99)
GLUCOSE BLD-MCNC: 139 MG/DL (ref 74–99)
GLUCOSE BLD-MCNC: 141 MG/DL (ref 74–99)
GLUCOSE BLD-MCNC: 251 MG/DL (ref 74–99)
GLUCOSE SERPL-MCNC: 127 MG/DL (ref 74–99)
HBV SURFACE AB SERPL IA-ACNC: <3.1 MIU/ML (ref 0–9.99)
HBV SURFACE AG SERPL QL IA: NONREACTIVE
HCT VFR BLD AUTO: 21.3 % (ref 34–48)
HGB BLD-MCNC: 6.6 G/DL (ref 11.5–15.5)
IMM GRANULOCYTES # BLD AUTO: 0.06 K/UL (ref 0–0.58)
IMM GRANULOCYTES NFR BLD: 1 % (ref 0–5)
IRON SATN MFR SERPL: 32 % (ref 15–50)
IRON SERPL-MCNC: 32 UG/DL (ref 37–145)
LYMPHOCYTES NFR BLD: 0.92 K/UL (ref 1.5–4)
LYMPHOCYTES RELATIVE PERCENT: 13 % (ref 20–42)
MCH RBC QN AUTO: 32.5 PG (ref 26–35)
MCHC RBC AUTO-ENTMCNC: 31 G/DL (ref 32–34.5)
MCV RBC AUTO: 104.9 FL (ref 80–99.9)
MICROORGANISM SPEC CULT: NO GROWTH
MONOCYTES NFR BLD: 0.7 K/UL (ref 0.1–0.95)
MONOCYTES NFR BLD: 10 % (ref 2–12)
NEUTROPHILS NFR BLD: 74 % (ref 43–80)
NEUTS SEG NFR BLD: 5.31 K/UL (ref 1.8–7.3)
PHOSPHATE SERPL-MCNC: 5.5 MG/DL (ref 2.5–4.5)
PLATELET # BLD AUTO: 231 K/UL (ref 130–450)
PMV BLD AUTO: 9.9 FL (ref 7–12)
POTASSIUM SERPL-SCNC: 4 MMOL/L (ref 3.5–5)
PROT SERPL-MCNC: 5.7 G/DL (ref 6.4–8.3)
PTH-INTACT SERPL-MCNC: 186.2 PG/ML (ref 15–65)
RBC # BLD AUTO: 2.03 M/UL (ref 3.5–5.5)
SODIUM SERPL-SCNC: 138 MMOL/L (ref 132–146)
SPECIMEN DESCRIPTION: NORMAL
TIBC SERPL-MCNC: 99 UG/DL (ref 250–450)
WBC OTHER # BLD: 7.2 K/UL (ref 4.5–11.5)

## 2024-01-04 PROCEDURE — 87077 CULTURE AEROBIC IDENTIFY: CPT

## 2024-01-04 PROCEDURE — 83970 ASSAY OF PARATHORMONE: CPT

## 2024-01-04 PROCEDURE — P9047 ALBUMIN (HUMAN), 25%, 50ML: HCPCS

## 2024-01-04 PROCEDURE — 86403 PARTICLE AGGLUT ANTBDY SCRN: CPT

## 2024-01-04 PROCEDURE — 86850 RBC ANTIBODY SCREEN: CPT

## 2024-01-04 PROCEDURE — 82962 GLUCOSE BLOOD TEST: CPT

## 2024-01-04 PROCEDURE — 2580000003 HC RX 258: Performed by: FAMILY MEDICINE

## 2024-01-04 PROCEDURE — 6370000000 HC RX 637 (ALT 250 FOR IP): Performed by: FAMILY MEDICINE

## 2024-01-04 PROCEDURE — 86923 COMPATIBILITY TEST ELECTRIC: CPT

## 2024-01-04 PROCEDURE — 83550 IRON BINDING TEST: CPT

## 2024-01-04 PROCEDURE — 87205 SMEAR GRAM STAIN: CPT

## 2024-01-04 PROCEDURE — 83540 ASSAY OF IRON: CPT

## 2024-01-04 PROCEDURE — 85025 COMPLETE CBC W/AUTO DIFF WBC: CPT

## 2024-01-04 PROCEDURE — 6360000002 HC RX W HCPCS: Performed by: FAMILY MEDICINE

## 2024-01-04 PROCEDURE — 80053 COMPREHEN METABOLIC PANEL: CPT

## 2024-01-04 PROCEDURE — 86901 BLOOD TYPING SEROLOGIC RH(D): CPT

## 2024-01-04 PROCEDURE — 87070 CULTURE OTHR SPECIMN AEROBIC: CPT

## 2024-01-04 PROCEDURE — 86900 BLOOD TYPING SEROLOGIC ABO: CPT

## 2024-01-04 PROCEDURE — 90935 HEMODIALYSIS ONE EVALUATION: CPT

## 2024-01-04 PROCEDURE — 6360000002 HC RX W HCPCS

## 2024-01-04 PROCEDURE — 1200000000 HC SEMI PRIVATE

## 2024-01-04 PROCEDURE — 36415 COLL VENOUS BLD VENIPUNCTURE: CPT

## 2024-01-04 PROCEDURE — 93010 ELECTROCARDIOGRAM REPORT: CPT | Performed by: INTERNAL MEDICINE

## 2024-01-04 PROCEDURE — 5A1D70Z PERFORMANCE OF URINARY FILTRATION, INTERMITTENT, LESS THAN 6 HOURS PER DAY: ICD-10-PCS | Performed by: INTERNAL MEDICINE

## 2024-01-04 PROCEDURE — 82728 ASSAY OF FERRITIN: CPT

## 2024-01-04 PROCEDURE — 84100 ASSAY OF PHOSPHORUS: CPT

## 2024-01-04 PROCEDURE — 99232 SBSQ HOSP IP/OBS MODERATE 35: CPT | Performed by: FAMILY MEDICINE

## 2024-01-04 RX ORDER — MIDODRINE HYDROCHLORIDE 5 MG/1
15 TABLET ORAL SEE ADMIN INSTRUCTIONS
Qty: 90 TABLET | Status: ON HOLD | DISCHARGE
Start: 2024-01-04

## 2024-01-04 RX ORDER — ALBUMIN (HUMAN) 12.5 G/50ML
SOLUTION INTRAVENOUS
Status: COMPLETED
Start: 2024-01-04 | End: 2024-01-04

## 2024-01-04 RX ORDER — ALBUMIN (HUMAN) 12.5 G/50ML
25 SOLUTION INTRAVENOUS SEE ADMIN INSTRUCTIONS
Status: COMPLETED | OUTPATIENT
Start: 2024-01-04 | End: 2024-01-04

## 2024-01-04 RX ORDER — BISACODYL 10 MG
10 SUPPOSITORY, RECTAL RECTAL DAILY PRN
Status: ON HOLD | DISCHARGE
Start: 2024-01-04

## 2024-01-04 RX ORDER — METOPROLOL SUCCINATE 25 MG/1
25 TABLET, EXTENDED RELEASE ORAL DAILY
Qty: 30 TABLET | Refills: 3 | Status: ON HOLD | DISCHARGE
Start: 2024-01-05

## 2024-01-04 RX ORDER — SODIUM CHLORIDE 9 MG/ML
INJECTION, SOLUTION INTRAVENOUS PRN
Status: DISCONTINUED | OUTPATIENT
Start: 2024-01-04 | End: 2024-01-08 | Stop reason: HOSPADM

## 2024-01-04 RX ORDER — SENNOSIDES A AND B 8.6 MG/1
2 TABLET, FILM COATED ORAL 2 TIMES DAILY
Status: ON HOLD | DISCHARGE
Start: 2024-01-04

## 2024-01-04 RX ADMIN — METOPROLOL SUCCINATE 25 MG: 25 TABLET, EXTENDED RELEASE ORAL at 08:16

## 2024-01-04 RX ADMIN — ACETAMINOPHEN 650 MG: 325 TABLET ORAL at 04:06

## 2024-01-04 RX ADMIN — ALBUMIN (HUMAN) 25 G: 0.25 INJECTION, SOLUTION INTRAVENOUS at 13:55

## 2024-01-04 RX ADMIN — HEPARIN SODIUM 5000 UNITS: 10000 INJECTION INTRAVENOUS; SUBCUTANEOUS at 21:21

## 2024-01-04 RX ADMIN — MIDODRINE HYDROCHLORIDE 15 MG: 5 TABLET ORAL at 08:16

## 2024-01-04 RX ADMIN — ALBUMIN (HUMAN) 25 G: 12.5 SOLUTION INTRAVENOUS at 13:55

## 2024-01-04 RX ADMIN — SENNOSIDES 17.2 MG: 8.6 TABLET, COATED ORAL at 21:18

## 2024-01-04 RX ADMIN — Medication 10 ML: at 01:03

## 2024-01-04 RX ADMIN — Medication 10 ML: at 08:16

## 2024-01-04 RX ADMIN — HEPARIN SODIUM 5000 UNITS: 10000 INJECTION INTRAVENOUS; SUBCUTANEOUS at 06:08

## 2024-01-04 RX ADMIN — HEPARIN SODIUM 5000 UNITS: 10000 INJECTION INTRAVENOUS; SUBCUTANEOUS at 01:09

## 2024-01-04 RX ADMIN — INSULIN LISPRO 4 UNITS: 100 INJECTION, SOLUTION INTRAVENOUS; SUBCUTANEOUS at 10:51

## 2024-01-04 ASSESSMENT — PAIN DESCRIPTION - LOCATION
LOCATION: BUTTOCKS
LOCATION: COCCYX;BREAST

## 2024-01-04 ASSESSMENT — PAIN SCALES - GENERAL
PAINLEVEL_OUTOF10: 10
PAINLEVEL_OUTOF10: 0
PAINLEVEL_OUTOF10: 10

## 2024-01-04 NOTE — ACP (ADVANCE CARE PLANNING)
Advance Care Planning   Healthcare Decision Maker:    Primary Decision Maker: MaynorInga - Child - [phone]    Primary Decision Maker: Johnson Mcguire - Child - [phone]    Click here to complete Healthcare Decision Makers including selection of the Healthcare Decision Maker Relationship (ie \"Primary\").

## 2024-01-04 NOTE — ED NOTES
ED to Inpatient Handoff Report    Notified [facility] that electronic handoff available and patient ready for transport to room 429.    Safety Risks: Memory Problems and Risk of falls    Patient in Restraints: no    Constant Observer or Patient : no    Telemetry Monitoring Ordered :Yes           Order to transfer to unit without monitor:YES    Last MEWS: 1 Time completed: 0100    Deterioration Index Score:   Predictive Model Details          27 (Normal)  Factor Value    Calculated 1/4/2024 01:01 46% Age 73 years old    Deterioration Index Model 26% Sherman coma scale 14     9% Respiratory rate 18     7% Hematocrit abnormal (24.4 %)     5% Systolic 136     3% Pulse 93     3% Sodium 134 mmol/L     0% Potassium 4.0 mmol/L     0% WBC count 8.0 k/uL     0% Pulse oximetry 95 %     0% Temperature 98.6 °F (37 °C)        Vitals:    01/03/24 1253 01/03/24 1657 01/03/24 1917 01/04/24 0053   BP: (!) 110/41 113/65 131/63 136/65   Pulse: 98 99 91 93   Resp: 20 18 18 18   Temp:    98.6 °F (37 °C)   TempSrc:       SpO2: 98% 97% 97% 95%   Weight:       Height:             Opportunity for questions and clarification was provided.    2

## 2024-01-04 NOTE — DISCHARGE INSTR - COC
Wound Width (cm) 5.4 cm 01/04/24 0157   Wound Surface Area (cm^2) 29.7 cm^2 01/04/24 0157   Change in Wound Size % (l*w) -1385 01/04/24 0157   Wound Assessment Bleeding;Pink/red 01/04/24 0157   Drainage Amount Small (< 25%) 01/04/24 0157   Drainage Description Serosanguinous 01/04/24 0157   Odor Moderate 01/04/24 0157   Carmen-wound Assessment Blanchable erythema 01/04/24 0157   Number of days: 65       Wound 11/07/23 Hip Left (Active)   Number of days: 57       Wound 01/04/24 Breast Lateral;Lower;Left (Active)   Dressing Status New dressing applied 01/04/24 0157   Wound Cleansed Cleansed with saline 01/04/24 0157   Dressing/Treatment Foam 01/04/24 0157   Dressing Change Due 01/05/24 01/04/24 0157   Wound Length (cm) 7 cm 01/04/24 0157   Wound Width (cm) 2 cm 01/04/24 0157   Wound Surface Area (cm^2) 14 cm^2 01/04/24 0157   Wound Assessment Bleeding;Slough 01/04/24 0157   Drainage Amount Moderate (25-50%) 01/04/24 0157   Drainage Description Serosanguinous;Yellow 01/04/24 0157   Odor Moderate 01/04/24 0157   Carmen-wound Assessment Blanchable erythema 01/04/24 0157   Margins Attached edges 01/04/24 0157   Number of days: 0       Wound 01/04/24 Abdomen Lower;Medial (Active)   Dressing Status New dressing applied 01/04/24 0157   Wound Cleansed Cleansed with saline 01/04/24 0157   Dressing/Treatment Foam 01/04/24 0157   Wound Length (cm) 2.5 cm 01/04/24 0157   Wound Width (cm) 0.5 cm 01/04/24 0157   Wound Surface Area (cm^2) 1.25 cm^2 01/04/24 0157   Wound Assessment Subcutaneous 01/04/24 0157   Drainage Amount Small (< 25%) 01/04/24 0157   Drainage Description Clear 01/04/24 0157   Odor None 01/04/24 0157   Carmen-wound Assessment Blanchable erythema;Fragile 01/04/24 0157   Margins Attached edges 01/04/24 0157   Number of days: 0        Elimination:  Continence:   Bowel: No  Bladder: No  Urinary Catheter: None   Colostomy/Ileostomy/Ileal Conduit: No       Date of Last BM: 1/8/2024    Intake/Output Summary (Last 24 hours)

## 2024-01-04 NOTE — CARE COORDINATION
Social Work/Discharge Planning:  Discharge order noted.  Patient off unit for dialysis.  Called patient daughter Inag (ph: [phone]) and informed her of discharge order.  Explained Social Work role.  Inga states her mother resides at [facility].  Patient has dialysis Monday through Friday at [facility].  Inga confirmed plan for her mother to return to [facility].  Called Denise with [facility] and confirmed patient can return but she will need dialysis approval.  Informed Denise that will arrange ambulance transport for this evening.  Called [facility] and arranged ambulance transport for 6:00pm.  Notified patient daughter Inga, RN and liaison Denise with [facility] of discharge time.  Electronically signed by SHANTA Shine on 1/4/2024 at 1:45 PM

## 2024-01-04 NOTE — CONSULTS
[facility] Nephrology Consult Note  Patient's Name: Marisela Mcguire  8:28 AM  1/3/2024    Nephrologist: Dr. Khalil    Reason for Consult:  dialysis needs  Requesting Physician:  Rj Casillas MD    Chief Complaint:  syncopal episode at HD    History Obtained From:  chart    History of Present Ilness:    Marisela Mcguire is a 73 y.o. female with past medical history of anemia in CKD, ESRD on HD, DM, hyperkalemia, HTN, kidney stones, and wounds. The patient is well known to our practice as we follow her ESRD on hemodialysis at the Dialysis Den at [facility].    She was recently hospitalized at this hospital on 10/31 -11/1 for treatment of nausea and vomiting which resolved. She returned to the ED on 11/3 with altered mental status.  [facility] and then was transferred to [facility] for further evaluation of encephalopathy.      Per their discharge summary,Neurology was consulted and performed a thorough work-up including MRI Brain, lumbar puncture with cerebrospinal fluid studies, electroencephalogram (EEG). The conclusion of that work-up was a diagnosis of metabolic encephalopathy.     Infectious Disease was consulted for assistance with antimicrobial management. Initially, they supported broad antibiotic coverage, but when her mentation improved, infectious work-up was completed, they narrowed their coverage with low concern for ongoing systemic infection. 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 They recommended a 2 week course of Augmentin 875/125 mg oral, which was completed on 11/27/23. Plastic surgery was also consulted for this wound and felt debridement was not indicated.      Her orientation improved over the course of about 1 week, thought to be due to ongoing dialysis treatments. Her course was then complicated by intermittent hyperactive delirium, which limited her disposition planning. Geriatrics was 
CoNS in 1 of 2 sets.  Treated with Ceftriaxone.  Seen by Dr. Trent.  Reviewed with [place].  Coccygeal wound grew mixed GNR, Streptococcus agalactiae.  She was transferred to [place].    March 2023.  Admitted to [facility] with confusion.  Blood cultures grew Enterococcus faecalis and Staphylococcus epidermidis.  Treated with Vancomycin and Zosyn.  Seen by Dr. Trent and treated with Vancomycin.  Etiology of bacteremia was not clear.  She was noted to have a nonhealing right leg wound without evidence of infection.  She was discharged on Augmentin.    February 2022.  Admitted to [facility] with lethargy.  This was treated with Vancomycin and Zosyn.  Seen by Dr. Trent and treated with Augmentin for suspected aspiration pneumonia.  ID signed off.    November 2022.  Admitted to [facility] with acute confusion related to Percocet use.  Seen by ID for complicated UTI.  She had asymptomatic bacteriuria and warranted no antibiotics.  Tested positive for SARS-CoV-2 but was asymptomatic.  ID signed off.    May 2021.  Admitted to [facility] from [place] with jerking motions and an elevated white count.  Treated with Ceftriaxone.  Seen by ID for UTI.  There was thought that the white count was elevated because of dystonia.  She had asymptomatic bacteriuria.  This was not further treated.  ID signed off     5/21/2020.  Readmitted to [facility] because of recurrent melena.  She had an EGD that showed a prepyloric ulcer.  Seen by Dr. Trent because she was being treated for a sacral decubitus ulcer.  Zosyn, Fluconazole and Vancomycin were resumed.  Wound culture grew Klebsiella oxytoca and Enterococcus faecium (VRE) antibiotics were changed over to Ertapenem and Daptomycin.     5/3/2020.  Admitted to [facility] with worsening pain, drainage and odor from eyes sacral decubitus ulcer.  She had been prescribed Bactrim and cephalosporins as an outpatient.  Seen by Dr. Trent.  She tested positive for

## 2024-01-04 NOTE — PLAN OF CARE
Problem: Safety - Adult  Goal: Free from fall injury  Outcome: Progressing     Problem: Discharge Planning  Goal: Discharge to home or other facility with appropriate resources  Outcome: Progressing     Problem: Skin/Tissue Integrity  Goal: Absence of new skin breakdown  Description: 1.  Monitor for areas of redness and/or skin breakdown  2.  Assess vascular access sites hourly  3.  Every 4-6 hours minimum:  Change oxygen saturation probe site  4.  Every 4-6 hours:  If on nasal continuous positive airway pressure, respiratory therapy assess nares and determine need for appliance change or resting period.  Outcome: Progressing     Problem: Pain  Goal: Verbalizes/displays adequate comfort level or baseline comfort level  Outcome: Progressing

## 2024-01-04 NOTE — FLOWSHEET NOTE
01/04/24 1554   Vital Signs   /85   Temp 97 °F (36.1 °C)   Pulse 98   Respirations 18   Weight - Scale 72.2 kg (159 lb 2.8 oz)   Weight Method Bed scale   Percent Weight Change -0.41   Post-Hemodialysis Assessment   Post-Treatment Procedures Blood returned;Access bleeding time < 10 minutes   Machine Disinfection Process Acid/Vinegar Clean;Heat Disinfect;Exterior Machine Disinfection   Rinseback Volume (ml) 300 ml   Blood Volume Processed (Liters) 46.5 L   Dialyzer Clearance Moderately streaked   Duration of Treatment (minutes) 180 minutes   Heparin Amount Administered During Treatment (mL) 0 mL   Hemodialysis Intake (ml) 500 ml   Hemodialysis Output (ml) 856 ml   NET Removed (ml) 356   Tolerated Treatment Fair   Interventions Taken Fluid bolus;Medication;Ultrafiltration stopped;Ultrafiltration goal decreased   Patient Response to Treatment tolerated tx fair, pt became hypotensive, Uf goal stopped and bolus given, Albumin 25mg 25% given per orders, 356ml fluid removed   Physician Notified Yes   Time Off 1544   Patient Disposition Return to room   Observations & Evaluations   Level of Consciousness 0   Respiratory Quality/Effort Unlabored   O2 Device None (Room air)   Skin Condition/Temp Dry;Warm     Tolerated  HD 3 hr on 3 k 2.5 ca bath, 356 ml removed, pt became hypotensive throughout tx, uf goal decreased then turned off, 200ml bolus of NSS given, 25mg 25% albumin given, Dr. Astudillo aware.  Plains removed and pressure held until hemostasis achieved and dressing applied. Pt alert and screaming unable to reorient, report to RN, patient remains in care of floor staff.

## 2024-01-05 LAB
GLUCOSE BLD-MCNC: 110 MG/DL (ref 74–99)
GLUCOSE BLD-MCNC: 110 MG/DL (ref 74–99)
GLUCOSE BLD-MCNC: 136 MG/DL (ref 74–99)
GLUCOSE BLD-MCNC: 138 MG/DL (ref 74–99)
HCT VFR BLD AUTO: 24.6 % (ref 34–48)
HGB BLD-MCNC: 8 G/DL (ref 11.5–15.5)

## 2024-01-05 PROCEDURE — 6370000000 HC RX 637 (ALT 250 FOR IP): Performed by: FAMILY MEDICINE

## 2024-01-05 PROCEDURE — 1200000000 HC SEMI PRIVATE

## 2024-01-05 PROCEDURE — 36430 TRANSFUSION BLD/BLD COMPNT: CPT

## 2024-01-05 PROCEDURE — 6360000002 HC RX W HCPCS: Performed by: FAMILY MEDICINE

## 2024-01-05 PROCEDURE — 82962 GLUCOSE BLOOD TEST: CPT

## 2024-01-05 PROCEDURE — 85014 HEMATOCRIT: CPT

## 2024-01-05 PROCEDURE — 85018 HEMOGLOBIN: CPT

## 2024-01-05 PROCEDURE — 30233N1 TRANSFUSION OF NONAUTOLOGOUS RED BLOOD CELLS INTO PERIPHERAL VEIN, PERCUTANEOUS APPROACH: ICD-10-PCS | Performed by: PEDIATRICS

## 2024-01-05 PROCEDURE — P9016 RBC LEUKOCYTES REDUCED: HCPCS

## 2024-01-05 PROCEDURE — 99232 SBSQ HOSP IP/OBS MODERATE 35: CPT | Performed by: FAMILY MEDICINE

## 2024-01-05 PROCEDURE — 2580000003 HC RX 258: Performed by: FAMILY MEDICINE

## 2024-01-05 RX ADMIN — HEPARIN SODIUM 5000 UNITS: 10000 INJECTION INTRAVENOUS; SUBCUTANEOUS at 20:21

## 2024-01-05 RX ADMIN — Medication 10 ML: at 20:20

## 2024-01-05 RX ADMIN — MIDODRINE HYDROCHLORIDE 15 MG: 5 TABLET ORAL at 07:47

## 2024-01-05 RX ADMIN — SODIUM CHLORIDE, PRESERVATIVE FREE 10 ML: 5 INJECTION INTRAVENOUS at 09:06

## 2024-01-05 RX ADMIN — METOPROLOL SUCCINATE 25 MG: 25 TABLET, EXTENDED RELEASE ORAL at 07:47

## 2024-01-05 RX ADMIN — HEPARIN SODIUM 5000 UNITS: 10000 INJECTION INTRAVENOUS; SUBCUTANEOUS at 13:05

## 2024-01-05 RX ADMIN — SENNOSIDES 17.2 MG: 8.6 TABLET, COATED ORAL at 07:47

## 2024-01-05 RX ADMIN — HEPARIN SODIUM 5000 UNITS: 10000 INJECTION INTRAVENOUS; SUBCUTANEOUS at 05:34

## 2024-01-05 RX ADMIN — SENNOSIDES 17.2 MG: 8.6 TABLET, COATED ORAL at 20:20

## 2024-01-05 ASSESSMENT — PAIN SCALES - GENERAL
PAINLEVEL_OUTOF10: 0

## 2024-01-05 NOTE — PLAN OF CARE
Problem: Safety - Adult  Goal: Free from fall injury  1/5/2024 1207 by Janet Gaspar RN  Outcome: Progressing     Problem: Discharge Planning  Goal: Discharge to home or other facility with appropriate resources  1/5/2024 1207 by Janet Gaspar RN  Outcome: Progressing     Problem: Skin/Tissue Integrity  Goal: Absence of new skin breakdown  Description: 1.  Monitor for areas of redness and/or skin breakdown  2.  Assess vascular access sites hourly  3.  Every 4-6 hours minimum:  Change oxygen saturation probe site  4.  Every 4-6 hours:  If on nasal continuous positive airway pressure, respiratory therapy assess nares and determine need for appliance change or resting period.  1/5/2024 1207 by Janet Gaspar RN  Outcome: Progressing     Problem: Pain  Goal: Verbalizes/displays adequate comfort level or baseline comfort level  1/5/2024 1207 by Janet Gaspar RN  Outcome: Progressing     Problem: Chronic Conditions and Co-morbidities  Goal: Patient's chronic conditions and co-morbidity symptoms are monitored and maintained or improved  1/5/2024 1207 by Janet Gaspar RN  Outcome: Progressing     Problem: Nutrition Deficit:  Goal: Optimize nutritional status  Outcome: Progressing

## 2024-01-05 NOTE — CARE COORDINATION
Social Work/Discharge Planning:  Patient discharge was held yesterday.  Denise with [facility] states patient can not return until she tolerates dialysis treatment without albumin.  Patient to have dialysis tomorrow per Nephrology note.   inquired if facility can accept patient tomorrow after dialysis.  Denise states facility will need to approve for return and there is no one available to review notes on Saturday 1/6.  Notified charge nurse and she will confirm address with Nephrology.  If patient receives dialysis today without albumin, then facility will review notes to confirm if they can accept patient today.  Will continue to follow.  Electronically signed by SHANTA Shine on 1/5/2024 at 8:29 AM

## 2024-01-05 NOTE — PLAN OF CARE
Problem: Safety - Adult  Goal: Free from fall injury  Outcome: Progressing     Problem: Discharge Planning  Goal: Discharge to home or other facility with appropriate resources  Outcome: Progressing     Problem: Skin/Tissue Integrity  Goal: Absence of new skin breakdown  Description: 1.  Monitor for areas of redness and/or skin breakdown  2.  Assess vascular access sites hourly  3.  Every 4-6 hours minimum:  Change oxygen saturation probe site  4.  Every 4-6 hours:  If on nasal continuous positive airway pressure, respiratory therapy assess nares and determine need for appliance change or resting period.  Outcome: Progressing     Problem: Pain  Goal: Verbalizes/displays adequate comfort level or baseline comfort level  Outcome: Progressing     Problem: Chronic Conditions and Co-morbidities  Goal: Patient's chronic conditions and co-morbidity symptoms are monitored and maintained or improved  Outcome: Progressing

## 2024-01-06 LAB
ABO/RH: NORMAL
ANION GAP SERPL CALCULATED.3IONS-SCNC: 12 MMOL/L (ref 7–16)
ANTIBODY SCREEN: NEGATIVE
ARM BAND NUMBER: NORMAL
BLOOD BANK BLOOD PRODUCT EXPIRATION DATE: NORMAL
BLOOD BANK DISPENSE STATUS: NORMAL
BLOOD BANK ISBT PRODUCT BLOOD TYPE: 5100
BLOOD BANK PRODUCT CODE: NORMAL
BLOOD BANK SAMPLE EXPIRATION: NORMAL
BLOOD BANK UNIT TYPE AND RH: NORMAL
BPU ID: NORMAL
BUN SERPL-MCNC: 21 MG/DL (ref 6–23)
CALCIUM SERPL-MCNC: 8.8 MG/DL (ref 8.6–10.2)
CHLORIDE SERPL-SCNC: 101 MMOL/L (ref 98–107)
CO2 SERPL-SCNC: 28 MMOL/L (ref 22–29)
COMPONENT: NORMAL
CREAT SERPL-MCNC: 4.3 MG/DL (ref 0.5–1)
CROSSMATCH RESULT: NORMAL
ERYTHROCYTE [DISTWIDTH] IN BLOOD BY AUTOMATED COUNT: 18.3 % (ref 11.5–15)
GFR SERPL CREATININE-BSD FRML MDRD: 10 ML/MIN/1.73M2
GLUCOSE BLD-MCNC: 130 MG/DL (ref 74–99)
GLUCOSE BLD-MCNC: 200 MG/DL (ref 74–99)
GLUCOSE BLD-MCNC: 225 MG/DL (ref 74–99)
GLUCOSE SERPL-MCNC: 111 MG/DL (ref 74–99)
HCT VFR BLD AUTO: 26.1 % (ref 34–48)
HGB BLD-MCNC: 8.5 G/DL (ref 11.5–15.5)
MCH RBC QN AUTO: 32.6 PG (ref 26–35)
MCHC RBC AUTO-ENTMCNC: 32.6 G/DL (ref 32–34.5)
MCV RBC AUTO: 100 FL (ref 80–99.9)
MICROORGANISM SPEC CULT: ABNORMAL
MICROORGANISM/AGENT SPEC: ABNORMAL
PLATELET # BLD AUTO: 203 K/UL (ref 130–450)
PMV BLD AUTO: 9.8 FL (ref 7–12)
POTASSIUM SERPL-SCNC: 4.2 MMOL/L (ref 3.5–5)
RBC # BLD AUTO: 2.61 M/UL (ref 3.5–5.5)
SODIUM SERPL-SCNC: 141 MMOL/L (ref 132–146)
SPECIMEN DESCRIPTION: ABNORMAL
TRANSFUSION STATUS: NORMAL
UNIT DIVISION: 0
UNIT ISSUE DATE/TIME: NORMAL
WBC OTHER # BLD: 7.8 K/UL (ref 4.5–11.5)

## 2024-01-06 PROCEDURE — 85027 COMPLETE CBC AUTOMATED: CPT

## 2024-01-06 PROCEDURE — 6370000000 HC RX 637 (ALT 250 FOR IP): Performed by: FAMILY MEDICINE

## 2024-01-06 PROCEDURE — 1200000000 HC SEMI PRIVATE

## 2024-01-06 PROCEDURE — 2580000003 HC RX 258: Performed by: FAMILY MEDICINE

## 2024-01-06 PROCEDURE — 6360000002 HC RX W HCPCS: Performed by: FAMILY MEDICINE

## 2024-01-06 PROCEDURE — 99232 SBSQ HOSP IP/OBS MODERATE 35: CPT | Performed by: FAMILY MEDICINE

## 2024-01-06 PROCEDURE — 80048 BASIC METABOLIC PNL TOTAL CA: CPT

## 2024-01-06 PROCEDURE — 82962 GLUCOSE BLOOD TEST: CPT

## 2024-01-06 PROCEDURE — 90935 HEMODIALYSIS ONE EVALUATION: CPT

## 2024-01-06 RX ORDER — TRAMADOL HYDROCHLORIDE 50 MG/1
50 TABLET ORAL EVERY 6 HOURS PRN
Status: DISCONTINUED | OUTPATIENT
Start: 2024-01-06 | End: 2024-01-08 | Stop reason: HOSPADM

## 2024-01-06 RX ADMIN — SENNOSIDES 17.2 MG: 8.6 TABLET, COATED ORAL at 08:41

## 2024-01-06 RX ADMIN — HEPARIN SODIUM 5000 UNITS: 10000 INJECTION INTRAVENOUS; SUBCUTANEOUS at 20:42

## 2024-01-06 RX ADMIN — TRAMADOL HYDROCHLORIDE 50 MG: 50 TABLET, COATED ORAL at 18:43

## 2024-01-06 RX ADMIN — ACETAMINOPHEN 650 MG: 325 TABLET ORAL at 14:01

## 2024-01-06 RX ADMIN — PETROLATUM: 420 OINTMENT TOPICAL at 16:10

## 2024-01-06 RX ADMIN — INSULIN LISPRO 2 UNITS: 100 INJECTION, SOLUTION INTRAVENOUS; SUBCUTANEOUS at 16:09

## 2024-01-06 RX ADMIN — HEPARIN SODIUM 5000 UNITS: 10000 INJECTION INTRAVENOUS; SUBCUTANEOUS at 14:02

## 2024-01-06 RX ADMIN — SENNOSIDES 17.2 MG: 8.6 TABLET, COATED ORAL at 20:42

## 2024-01-06 RX ADMIN — Medication 10 ML: at 20:43

## 2024-01-06 RX ADMIN — METOPROLOL SUCCINATE 25 MG: 25 TABLET, EXTENDED RELEASE ORAL at 08:41

## 2024-01-06 RX ADMIN — SODIUM CHLORIDE, PRESERVATIVE FREE 20 ML: 5 INJECTION INTRAVENOUS at 09:19

## 2024-01-06 RX ADMIN — Medication 10 ML: at 08:41

## 2024-01-06 ASSESSMENT — PAIN DESCRIPTION - LOCATION
LOCATION: BREAST
LOCATION: BREAST

## 2024-01-06 ASSESSMENT — PAIN SCALES - GENERAL
PAINLEVEL_OUTOF10: 0
PAINLEVEL_OUTOF10: 0
PAINLEVEL_OUTOF10: 6
PAINLEVEL_OUTOF10: 3
PAINLEVEL_OUTOF10: 0

## 2024-01-06 ASSESSMENT — PAIN DESCRIPTION - DESCRIPTORS
DESCRIPTORS: ACHING
DESCRIPTORS: ACHING;DISCOMFORT

## 2024-01-06 NOTE — FLOWSHEET NOTE
01/06/24 1349   Vital Signs   /68   Temp 98.2 °F (36.8 °C)   Pulse 86   Respirations 18   Weight - Scale 65.2 kg (143 lb 11.8 oz)   Weight Method Stated   Percent Weight Change -1.66   Pain Assessment   Pain Assessment None - Denies Pain   Pain Level 0   Post-Hemodialysis Assessment   Post-Treatment Procedures Blood returned;Access bleeding time < 10 minutes   Machine Disinfection Process Exterior Machine Disinfection   Rinseback Volume (ml) 300 ml   Blood Volume Processed (Liters) 45.1 L   Dialyzer Clearance Moderately streaked   Duration of Treatment (minutes) 180 minutes   Heparin Amount Administered During Treatment (mL) 0 mL   Hemodialysis Intake (ml) 300 ml   Hemodialysis Output (ml) 1300 ml   NET Removed (ml) 1000   Tolerated Treatment Good   Interventions Taken Ultrafiltration goal decreased   Patient Response to Treatment pt tolerated tx well 1000mL removed bruit/thrill noted nurse to nurse report given to Rob COLLADO pt/vitals stable upon d/c dialysis unit pt sent back to room via transport   Physician Notified No   Patient Disposition Return to room

## 2024-01-07 LAB
GLUCOSE BLD-MCNC: 153 MG/DL (ref 74–99)
GLUCOSE BLD-MCNC: 162 MG/DL (ref 74–99)
GLUCOSE BLD-MCNC: 233 MG/DL (ref 74–99)
GLUCOSE BLD-MCNC: 278 MG/DL (ref 74–99)
MICROORGANISM SPEC CULT: ABNORMAL
MICROORGANISM SPEC CULT: NORMAL
MICROORGANISM SPEC CULT: NORMAL
MICROORGANISM/AGENT SPEC: ABNORMAL
SERVICE CMNT-IMP: NORMAL
SERVICE CMNT-IMP: NORMAL
SPECIMEN DESCRIPTION: ABNORMAL
SPECIMEN DESCRIPTION: NORMAL
SPECIMEN DESCRIPTION: NORMAL

## 2024-01-07 PROCEDURE — 99232 SBSQ HOSP IP/OBS MODERATE 35: CPT | Performed by: FAMILY MEDICINE

## 2024-01-07 PROCEDURE — 82962 GLUCOSE BLOOD TEST: CPT

## 2024-01-07 PROCEDURE — 2580000003 HC RX 258: Performed by: FAMILY MEDICINE

## 2024-01-07 PROCEDURE — 6370000000 HC RX 637 (ALT 250 FOR IP): Performed by: FAMILY MEDICINE

## 2024-01-07 PROCEDURE — 1200000000 HC SEMI PRIVATE

## 2024-01-07 PROCEDURE — 6360000002 HC RX W HCPCS: Performed by: FAMILY MEDICINE

## 2024-01-07 RX ADMIN — Medication 10 ML: at 09:33

## 2024-01-07 RX ADMIN — SENNOSIDES 17.2 MG: 8.6 TABLET, COATED ORAL at 09:33

## 2024-01-07 RX ADMIN — HEPARIN SODIUM 5000 UNITS: 10000 INJECTION INTRAVENOUS; SUBCUTANEOUS at 05:30

## 2024-01-07 RX ADMIN — TRAMADOL HYDROCHLORIDE 50 MG: 50 TABLET, COATED ORAL at 05:30

## 2024-01-07 RX ADMIN — METOPROLOL SUCCINATE 25 MG: 25 TABLET, EXTENDED RELEASE ORAL at 09:33

## 2024-01-07 RX ADMIN — INSULIN LISPRO 4 UNITS: 100 INJECTION, SOLUTION INTRAVENOUS; SUBCUTANEOUS at 15:33

## 2024-01-07 RX ADMIN — SENNOSIDES 17.2 MG: 8.6 TABLET, COATED ORAL at 20:22

## 2024-01-07 RX ADMIN — HEPARIN SODIUM 5000 UNITS: 10000 INJECTION INTRAVENOUS; SUBCUTANEOUS at 20:23

## 2024-01-07 RX ADMIN — HEPARIN SODIUM 5000 UNITS: 10000 INJECTION INTRAVENOUS; SUBCUTANEOUS at 15:33

## 2024-01-07 RX ADMIN — Medication 10 ML: at 20:22

## 2024-01-07 RX ADMIN — TRAMADOL HYDROCHLORIDE 50 MG: 50 TABLET, COATED ORAL at 22:14

## 2024-01-07 RX ADMIN — TRAMADOL HYDROCHLORIDE 50 MG: 50 TABLET, COATED ORAL at 15:33

## 2024-01-07 ASSESSMENT — PAIN DESCRIPTION - LOCATION: LOCATION: GENERALIZED

## 2024-01-07 ASSESSMENT — PAIN SCALES - GENERAL
PAINLEVEL_OUTOF10: 7
PAINLEVEL_OUTOF10: 0

## 2024-01-07 ASSESSMENT — PAIN DESCRIPTION - DESCRIPTORS: DESCRIPTORS: ACHING;DISCOMFORT;NAGGING

## 2024-01-08 VITALS
OXYGEN SATURATION: 95 % | SYSTOLIC BLOOD PRESSURE: 103 MMHG | HEIGHT: 60 IN | HEART RATE: 87 BPM | DIASTOLIC BLOOD PRESSURE: 49 MMHG | TEMPERATURE: 98 F | BODY MASS INDEX: 27.79 KG/M2 | WEIGHT: 141.54 LBS | RESPIRATION RATE: 18 BRPM

## 2024-01-08 LAB
ALBUMIN SERPL-MCNC: 2.3 G/DL (ref 3.5–5.2)
ALP SERPL-CCNC: 106 U/L (ref 35–104)
ALT SERPL-CCNC: 34 U/L (ref 0–32)
ANION GAP SERPL CALCULATED.3IONS-SCNC: 10 MMOL/L (ref 7–16)
AST SERPL-CCNC: 47 U/L (ref 0–31)
BILIRUB SERPL-MCNC: 0.3 MG/DL (ref 0–1.2)
BUN SERPL-MCNC: 19 MG/DL (ref 6–23)
CALCIUM SERPL-MCNC: 9.2 MG/DL (ref 8.6–10.2)
CHLORIDE SERPL-SCNC: 100 MMOL/L (ref 98–107)
CO2 SERPL-SCNC: 29 MMOL/L (ref 22–29)
CREAT SERPL-MCNC: 4.1 MG/DL (ref 0.5–1)
ERYTHROCYTE [DISTWIDTH] IN BLOOD BY AUTOMATED COUNT: 16.7 % (ref 11.5–15)
GFR SERPL CREATININE-BSD FRML MDRD: 11 ML/MIN/1.73M2
GLUCOSE BLD-MCNC: 112 MG/DL (ref 74–99)
GLUCOSE BLD-MCNC: 319 MG/DL (ref 74–99)
GLUCOSE SERPL-MCNC: 116 MG/DL (ref 74–99)
HCT VFR BLD AUTO: 27.2 % (ref 34–48)
HGB BLD-MCNC: 8.7 G/DL (ref 11.5–15.5)
MAGNESIUM SERPL-MCNC: 1.9 MG/DL (ref 1.6–2.6)
MCH RBC QN AUTO: 32.5 PG (ref 26–35)
MCHC RBC AUTO-ENTMCNC: 32 G/DL (ref 32–34.5)
MCV RBC AUTO: 101.5 FL (ref 80–99.9)
PHOSPHATE SERPL-MCNC: 4.3 MG/DL (ref 2.5–4.5)
PLATELET # BLD AUTO: 172 K/UL (ref 130–450)
PMV BLD AUTO: 10.1 FL (ref 7–12)
POTASSIUM SERPL-SCNC: 3.8 MMOL/L (ref 3.5–5)
PROT SERPL-MCNC: 6.1 G/DL (ref 6.4–8.3)
RBC # BLD AUTO: 2.68 M/UL (ref 3.5–5.5)
SODIUM SERPL-SCNC: 139 MMOL/L (ref 132–146)
WBC OTHER # BLD: 8.3 K/UL (ref 4.5–11.5)

## 2024-01-08 PROCEDURE — 83735 ASSAY OF MAGNESIUM: CPT

## 2024-01-08 PROCEDURE — 36415 COLL VENOUS BLD VENIPUNCTURE: CPT

## 2024-01-08 PROCEDURE — 6370000000 HC RX 637 (ALT 250 FOR IP): Performed by: FAMILY MEDICINE

## 2024-01-08 PROCEDURE — 84100 ASSAY OF PHOSPHORUS: CPT

## 2024-01-08 PROCEDURE — 82962 GLUCOSE BLOOD TEST: CPT

## 2024-01-08 PROCEDURE — 2580000003 HC RX 258: Performed by: FAMILY MEDICINE

## 2024-01-08 PROCEDURE — 80053 COMPREHEN METABOLIC PANEL: CPT

## 2024-01-08 PROCEDURE — 99238 HOSP IP/OBS DSCHRG MGMT 30/<: CPT | Performed by: FAMILY MEDICINE

## 2024-01-08 PROCEDURE — 6360000002 HC RX W HCPCS: Performed by: FAMILY MEDICINE

## 2024-01-08 PROCEDURE — 85027 COMPLETE CBC AUTOMATED: CPT

## 2024-01-08 RX ORDER — TRAMADOL HYDROCHLORIDE 50 MG/1
50 TABLET ORAL EVERY 6 HOURS PRN
Qty: 60 TABLET | Refills: 0 | Status: ON HOLD | OUTPATIENT
Start: 2024-01-08 | End: 2024-01-22

## 2024-01-08 RX ADMIN — METOPROLOL SUCCINATE 25 MG: 25 TABLET, EXTENDED RELEASE ORAL at 07:29

## 2024-01-08 RX ADMIN — HEPARIN SODIUM 5000 UNITS: 10000 INJECTION INTRAVENOUS; SUBCUTANEOUS at 05:23

## 2024-01-08 RX ADMIN — Medication 10 ML: at 07:29

## 2024-01-08 RX ADMIN — INSULIN LISPRO 6 UNITS: 100 INJECTION, SOLUTION INTRAVENOUS; SUBCUTANEOUS at 10:27

## 2024-01-08 RX ADMIN — MIDODRINE HYDROCHLORIDE 15 MG: 5 TABLET ORAL at 07:29

## 2024-01-08 RX ADMIN — SENNOSIDES 17.2 MG: 8.6 TABLET, COATED ORAL at 07:29

## 2024-01-08 ASSESSMENT — PAIN SCALES - GENERAL
PAINLEVEL_OUTOF10: 0
PAINLEVEL_OUTOF10: 0

## 2024-01-08 NOTE — CARE COORDINATION
Social Work/Discharge Planning:  Social Work consult noted for [facility].  Called Misty with [facility] and confirmed no availability.  Called [facility] and arranged ambulance transport for 12:00pm.  Notified patient, her daughter Inga (ph: [phone]), RN and liaison Denise with [facility] of discharge time.  Electronically signed by SHANTA Shine on 1/8/2024 at 10:25 AM

## 2024-01-11 NOTE — PROGRESS NOTES
Physician Progress Note      PATIENT:               FLAKITA IYER  CSN #:                  [id number]  :                       1950  ADMIT DATE:       2024 5:34 PM  DISCH DATE:  RESPONDING  PROVIDER #:        Rj Casillas MD          QUERY TEXT:    Pt admitted with UTI. Pt noted to have Confusion and AMS. If possible, please   document in the progress notes and discharge summary if you are evaluating and   / or treating any of the following:    The medical record reflects the following:  Risk Factors: UTI, possible sepsis, Decuib to the sacrum, Dementia  Clinical Indicators: She has a history of dementia and has been at her   baseline since then.  She is currently asymptomatic, awake alert not her   baseline, Hypothermic,  however quite disoriented and confused alert and   oriented x 1 noted in consult note 1/3/24  Treatment: Rocephin, Labs, ID consult, monitoring    Thank you, Marilin Tyler RN, CDS [phone]  Options provided:  -- Metabolic encephalopathy  -- Septic encephalopathy  -- Other - I will add my own diagnosis  -- Disagree - Not applicable / Not valid  -- Disagree - Clinically unable to determine / Unknown  -- Refer to Clinical Documentation Reviewer    PROVIDER RESPONSE TEXT:    This patient has metabolic encephalopathy.    Query created by: Marilin Tyler on 2024 1:28 PM      Electronically signed by:  Rj Casillas MD 2024 1:31 PM          
  Physician Progress Note      PATIENT:               FLAKITA IYER  CSN #:                  [id number]  :                       1950  ADMIT DATE:       2024 5:34 PM  DISCH DATE:        2024 12:20 PM  RESPONDING  PROVIDER #:        Rj Casillas MD          QUERY TEXT:    Patient admitted with concern for a syncopal event while dialysis, noted to   have Sepsis ruled out and UTI negative for infection. If possible, please   document in progress notes and discharge summary after study the etiology of   the syncope:    The medical record reflects the following:  Risk Factors: Syncope, ESRD  Clinical Indicators: This is a 73-year-old was sent over from, dialysis after   I believe she had a syncopal episode during dialysis.  Of note, she does take   midodrine on the days that she has dialysis due to  history of orthostatic hypotension; however, urine looked infected. Lactic   acid level was initially high at 6.5.  ID consult appreciated no evidence of   infection.  Temperature of 93 Fahrenheit on admission, 80/61,  Urine culture showed no growth, wounds no infection  Treatment: 750 cc IV NS bolus, UIT prophylactically initiated then stopped, ID   consult    Thank you, Marilin Tyler RN, CDS [phone]  Options provided:  -- Syncope due to hypotension of dialysis, UTI ruled out  -- Syncope due to other, please specify.  -- Other - I will add my own diagnosis  -- Disagree - Not applicable / Not valid  -- Disagree - Clinically unable to determine / Unknown  -- Refer to Clinical Documentation Reviewer    PROVIDER RESPONSE TEXT:    The syncope is due to a hypotension of dialysis, UTI ruled out.    Query created by: Marilin Tyler on 2024 7:48 AM      Electronically signed by:  Rj Casillas MD 2024 9:56 AM          
  Physician Progress Note      PATIENT:               FLAKITA IYER  CSN #:                  [id number]  :                       1950  ADMIT DATE:       2024 5:34 PM  DISCH DATE:  RESPONDING  PROVIDER #:        Rj Casillas MD          QUERY TEXT:    Patient admitted with UTI. Documentation reflects Sepsis in ED notes dated.    If possible, please document in the progress notes and discharge summary if   Sepsis was:    The medical record reflects the following:  Risk Factors: UTI, Sacral decube, possible sepsis  Clinical Indicators: AMS, Nursing completed a rectal temperature and her   temperature was 93, warming blanket applied, BP 80/61, P 101-110, resp 20,   WBC 8.0  Treatment: sodium chloride 0.9 % bolus 250 mL, sodium chloride 0.9 % bolus 500   mL, cefTRIAXone (ROCEPHIN), ID consult    Thank you, Marilin Tyler RN, CDS [phone]  Options provided:  -- Sepsis confirmed after study  -- Sepsis treated and resolved  -- Sepsis ruled out after study  -- Other - I will add my own diagnosis  -- Disagree - Not applicable / Not valid  -- Disagree - Clinically unable to determine / Unknown  -- Refer to Clinical Documentation Reviewer    PROVIDER RESPONSE TEXT:    Sepsis ruled out after study.    Query created by: Marilin Tyler on 2024 1:23 PM      Electronically signed by:  Rj Casillas MD 2024 1:26 PM          
24 hr chart check complete.  
24 hr chart check complete.  
4 Eyes Skin Assessment     NAME:  Marisela Mcguire  YOB: 1950  MEDICAL RECORD NUMBER:  [id number]    The patient is being assessed for  Admission    I agree that at least one RN has performed a thorough Head to Toe Skin Assessment on the patient. ALL assessment sites listed below have been assessed.      Areas assessed by both nurses:    Head, Face, Ears, Shoulders, Back, Chest, Arms, Elbows, Hands, Sacrum. Buttock, Coccyx, Ischium, and Legs. Feet and Heels        Does the Patient have a Wound? Yes wound(s) were present on assessment. LDA wound assessment was Initiated and completed by RN       Herbert Prevention initiated by RN: Yes  Wound Care Orders initiated by RN: Yes    Pressure Injury (Stage 3,4, Unstageable, DTI, NWPT, and Complex wounds) if present, place Wound referral order by RN under : Yes    New Ostomies, if present place, Ostomy referral order under : No     Nurse 1 eSignature: Electronically signed by Christen Villa RN on 1/4/24 at 2:35 AM EST    **SHARE this note so that the co-signing nurse can place an eSignature**    Nurse 2 eSignature: Electronically signed by Camila Ko RN on 1/4/24 at 2:48 AM EST    
Called and spoke with daughter, Inga, regarding hemodialysis consent.  Phone consent obtained with second RN, Cody.   
Comprehensive Nutrition Assessment    Type and Reason for Visit:  Initial, Positive Nutrition Screen, Wound    Nutrition Recommendations/Plan:   Continue current diet & monitor  Consider Carb Controlled or Phos diet restiction prn  Will add Jono BID & Nepro daily     Malnutrition Assessment:  Malnutrition Status:  At risk for malnutrition (Comment) (01/05/24 113)    Context:  Chronic Illness     Findings of the 6 clinical characteristics of malnutrition:  Energy Intake:  75% or less estimated energy requirements for 1 month or longer  Weight Loss:  Unable to assess (d/t fluid shifts)     Body Fat Loss:  Unable to assess     Muscle Mass Loss:  Unable to assess    Fluid Accumulation:  No significant fluid accumulation     Strength:  Not Performed    Nutrition Assessment:    Pt presents w/ syncopal event during dialysis. Note no UTI per ID. Hx DM, ESRD on HD, dementia. Discharge planning back to SNF in progress. Will add ONS & monitor.    Nutrition Related Findings:    A&O X1/agitated, I&Os WDL, BLE trace edema, abd rotund/obese, +BS, Glu 127, Phos 5.5   Wound Type: Multiple, Pressure Injury, Wound Consult Pending       Current Nutrition Intake & Therapies:    Average Meal Intake: 1-25%, %  Average Supplements Intake: None Ordered  ADULT DIET; Regular    Anthropometric Measures:  Height: 152.4 cm (5')  Ideal Body Weight (IBW): 100 lbs (45 kg)    Admission Body Weight: 72.5 kg (159 lb 13.3 oz) (1/4 bed)  Current Body Weight: 72.2 kg (159 lb 2.8 oz) (1/4 s/p HD), 159.2 % IBW. Weight Source: Bed Scale  Current BMI (kg/m2): 31.1  Usual Body Weight:  (KWASI wt change properly d/t fluid shifts (ESRD) w/ wt hx 167-208#, wt loss trend)                       BMI Categories: Obese Class 1 (BMI 30.0-34.9)    Estimated Daily Nutrient Needs:  Energy Requirements Based On: Formula  Weight Used for Energy Requirements: Current  Energy (kcal/day):   Weight Used for Protein Requirements: Ideal  Protein (g/day): 
ID consult appreciated no evidence of infection.  Patient may be discharged back to skilled nursing facility.  
[facility] notified of consult via the office. Chaim meier  
Nurse to nurse report called to Oleg COLLADO at [facility], notified them that transport is here to  the patient now   
Perfect serve out to dr chiang for possible picc order. Await response.     2107: dr chiang differed to renal due to kidney dr ordering the blood. Perfect serve sent to dr vann; await response.     2112: per dr vann via perfect serve if iv team is available tomorrow ok to delay transfusion til then hgb not far from baseline. Doesn't want to endanger her fustula with picc line.   
Spoke to clinical manager regarding IV placement.  
Spoke with Dr Astudillo regarding hgb 6.6.  New orders received.  Spoke with Dr Casillas and updated on hgb and no discharge.   
Spoke with Mary with [facility] and cancelled  for 1800.   
Subjective:    The patient is awake and alert.  No problems overnight.  Denies chest pain, angina, and dyspnea.  Denies abdominal pain.  Tolerating diet.  No nausea or vomiting.    Objective:    /70   Pulse 98   Temp 98 °F (36.7 °C) (Oral)   Resp 16   Ht 1.524 m (5')   Wt 73.7 kg (162 lb 6.4 oz)   SpO2 91%   BMI 31.72 kg/m²     Heart:  RRR, no murmurs, gallops, or rubs.  Lungs:  CTA bilaterally, no wheeze, rales or rhonchi  Abd: bowel sounds present, nontender, nondistended, no masses  Extrem:  No clubbing, cyanosis, or edema    CBC with Differential:    Lab Results   Component Value Date/Time    WBC 8.0 01/02/2024 06:00 PM    RBC 2.28 01/02/2024 06:00 PM    HGB 7.3 01/02/2024 06:00 PM    HCT 24.4 01/02/2024 06:00 PM     01/02/2024 06:00 PM    .0 01/02/2024 06:00 PM    MCH 32.0 01/02/2024 06:00 PM    MCHC 29.9 01/02/2024 06:00 PM    RDW 16.1 01/02/2024 06:00 PM    NRBC 0.0 11/26/2022 07:02 AM    SEGSPCT 71 08/16/2013 05:00 AM    BANDSPCT 1 03/29/2016 07:37 PM    METASPCT 0.9 03/15/2023 07:48 AM    LYMPHOPCT 10 01/02/2024 06:00 PM    PROMYELOPCT 0.9 11/07/2022 05:59 AM    MONOPCT 8 01/02/2024 06:00 PM    MYELOPCT 0.9 11/26/2022 07:02 AM    BASOPCT 0 01/02/2024 06:00 PM    MONOSABS 0.67 01/02/2024 06:00 PM    LYMPHSABS 0.79 01/02/2024 06:00 PM    EOSABS 0.18 01/02/2024 06:00 PM    BASOSABS 0.03 01/02/2024 06:00 PM     CMP:    Lab Results   Component Value Date/Time     01/02/2024 06:00 PM    K 4.0 01/02/2024 06:00 PM    K 4.0 03/15/2023 07:48 AM    CL 93 01/02/2024 06:00 PM    CO2 29 01/02/2024 06:00 PM    BUN 22 01/02/2024 06:00 PM    CREATININE 3.3 01/02/2024 06:00 PM    GFRAA 7 10/12/2022 03:49 AM    LABGLOM 14 01/02/2024 06:00 PM    GLUCOSE 124 01/02/2024 06:00 PM    GLUCOSE 149 10/12/2011 09:59 AM    PROT 5.8 01/02/2024 06:00 PM    LABALBU 2.1 01/02/2024 06:00 PM    LABALBU 4.1 10/12/2011 09:59 AM    CALCIUM 9.2 01/02/2024 06:00 PM    BILITOT <0.2 01/02/2024 06:00 PM    ALKPHOS 
Subjective:    The patient is awake and alert.  No problems overnight.  Denies chest pain, angina, and dyspnea.  Denies abdominal pain.  Tolerating diet.  No nausea or vomiting.    Objective:    BP (!) 112/52   Pulse 85   Temp 97.5 °F (36.4 °C) (Axillary)   Resp 18   Ht 1.524 m (5')   Wt 73.3 kg (161 lb 11.2 oz)   SpO2 98%   BMI 31.58 kg/m²     Heart:  RRR, no murmurs, gallops, or rubs.  Lungs:  CTA bilaterally, no wheeze, rales or rhonchi  Abd: bowel sounds present, nontender, nondistended, no masses  Extrem:  No clubbing, cyanosis, or edema    CBC with Differential:    Lab Results   Component Value Date/Time    WBC 7.2 01/04/2024 02:45 PM    RBC 2.03 01/04/2024 02:45 PM    HGB 8.0 01/05/2024 03:00 PM    HCT 24.6 01/05/2024 03:00 PM     01/04/2024 02:45 PM    .9 01/04/2024 02:45 PM    MCH 32.5 01/04/2024 02:45 PM    MCHC 31.0 01/04/2024 02:45 PM    RDW 15.9 01/04/2024 02:45 PM    NRBC 0.0 11/26/2022 07:02 AM    SEGSPCT 71 08/16/2013 05:00 AM    BANDSPCT 1 03/29/2016 07:37 PM    METASPCT 0.9 03/15/2023 07:48 AM    LYMPHOPCT 13 01/04/2024 02:45 PM    PROMYELOPCT 0.9 11/07/2022 05:59 AM    MONOPCT 10 01/04/2024 02:45 PM    MYELOPCT 0.9 11/26/2022 07:02 AM    BASOPCT 1 01/04/2024 02:45 PM    MONOSABS 0.70 01/04/2024 02:45 PM    LYMPHSABS 0.92 01/04/2024 02:45 PM    EOSABS 0.19 01/04/2024 02:45 PM    BASOSABS 0.04 01/04/2024 02:45 PM     CMP:    Lab Results   Component Value Date/Time     01/04/2024 02:45 PM    K 4.0 01/04/2024 02:45 PM    K 4.0 03/15/2023 07:48 AM    CL 97 01/04/2024 02:45 PM    CO2 30 01/04/2024 02:45 PM    BUN 30 01/04/2024 02:45 PM    CREATININE 4.9 01/04/2024 02:45 PM    GFRAA 7 10/12/2022 03:49 AM    LABGLOM 9 01/04/2024 02:45 PM    GLUCOSE 127 01/04/2024 02:45 PM    GLUCOSE 149 10/12/2011 09:59 AM    PROT 5.7 01/04/2024 02:45 PM    LABALBU 2.0 01/04/2024 02:45 PM    LABALBU 4.1 10/12/2011 09:59 AM    CALCIUM 8.9 01/04/2024 02:45 PM    BILITOT 0.2 01/04/2024 02:45 PM    
Subjective:    The patient is awake and alert.  No problems overnight.  Denies chest pain, angina, and dyspnea.  Denies abdominal pain.  Tolerating diet.  No nausea or vomiting.  Discharge was held yesterday due to low hemoglobin.  Patient is ordered a unit of red blood cells for today.    Objective:    BP (!) 109/49   Pulse 83   Temp 97 °F (36.1 °C) (Axillary)   Resp 20   Ht 1.524 m (5')   Wt 72.2 kg (159 lb 2.8 oz)   SpO2 96%   BMI 31.09 kg/m²     Heart:  RRR, no murmurs, gallops, or rubs.  Lungs:  CTA bilaterally, no wheeze, rales or rhonchi  Abd: bowel sounds present, nontender, nondistended, no masses  Extrem:  No clubbing, cyanosis, or edema    CBC with Differential:    Lab Results   Component Value Date/Time    WBC 7.2 01/04/2024 02:45 PM    RBC 2.03 01/04/2024 02:45 PM    HGB 6.6 01/04/2024 02:45 PM    HCT 21.3 01/04/2024 02:45 PM     01/04/2024 02:45 PM    .9 01/04/2024 02:45 PM    MCH 32.5 01/04/2024 02:45 PM    MCHC 31.0 01/04/2024 02:45 PM    RDW 15.9 01/04/2024 02:45 PM    NRBC 0.0 11/26/2022 07:02 AM    SEGSPCT 71 08/16/2013 05:00 AM    BANDSPCT 1 03/29/2016 07:37 PM    METASPCT 0.9 03/15/2023 07:48 AM    LYMPHOPCT 13 01/04/2024 02:45 PM    PROMYELOPCT 0.9 11/07/2022 05:59 AM    MONOPCT 10 01/04/2024 02:45 PM    MYELOPCT 0.9 11/26/2022 07:02 AM    BASOPCT 1 01/04/2024 02:45 PM    MONOSABS 0.70 01/04/2024 02:45 PM    LYMPHSABS 0.92 01/04/2024 02:45 PM    EOSABS 0.19 01/04/2024 02:45 PM    BASOSABS 0.04 01/04/2024 02:45 PM     CMP:    Lab Results   Component Value Date/Time     01/04/2024 02:45 PM    K 4.0 01/04/2024 02:45 PM    K 4.0 03/15/2023 07:48 AM    CL 97 01/04/2024 02:45 PM    CO2 30 01/04/2024 02:45 PM    BUN 30 01/04/2024 02:45 PM    CREATININE 4.9 01/04/2024 02:45 PM    GFRAA 7 10/12/2022 03:49 AM    LABGLOM 9 01/04/2024 02:45 PM    GLUCOSE 127 01/04/2024 02:45 PM    GLUCOSE 149 10/12/2011 09:59 AM    PROT 5.7 01/04/2024 02:45 PM    LABALBU 2.0 01/04/2024 02:45 PM 
Subjective:    The patient is awake and alert.  No problems overnight.  Denies chest pain, angina, and dyspnea.  Denies abdominal pain.  Tolerating diet.  No nausea or vomiting.  Patient states that she prefers [facility] instead of [facility].    Objective:    BP (!) 129/51   Pulse 90   Temp 98.1 °F (36.7 °C) (Axillary)   Resp 18   Ht 1.524 m (5')   Wt 64.2 kg (141 lb 8.6 oz)   SpO2 100%   BMI 27.64 kg/m²     Heart:  RRR, no murmurs, gallops, or rubs.  Lungs:  CTA bilaterally, no wheeze, rales or rhonchi  Abd: bowel sounds present, nontender, nondistended, no masses  Extrem:  No clubbing, cyanosis, or edema  Neuro: Alert and oriented x 3 with no focal deficits  CBC with Differential:    Lab Results   Component Value Date/Time    WBC 7.8 01/06/2024 03:30 AM    RBC 2.61 01/06/2024 03:30 AM    HGB 8.5 01/06/2024 03:30 AM    HCT 26.1 01/06/2024 03:30 AM     01/06/2024 03:30 AM    .0 01/06/2024 03:30 AM    MCH 32.6 01/06/2024 03:30 AM    MCHC 32.6 01/06/2024 03:30 AM    RDW 18.3 01/06/2024 03:30 AM    NRBC 0.0 11/26/2022 07:02 AM    SEGSPCT 71 08/16/2013 05:00 AM    BANDSPCT 1 03/29/2016 07:37 PM    METASPCT 0.9 03/15/2023 07:48 AM    LYMPHOPCT 13 01/04/2024 02:45 PM    PROMYELOPCT 0.9 11/07/2022 05:59 AM    MONOPCT 10 01/04/2024 02:45 PM    MYELOPCT 0.9 11/26/2022 07:02 AM    BASOPCT 1 01/04/2024 02:45 PM    MONOSABS 0.70 01/04/2024 02:45 PM    LYMPHSABS 0.92 01/04/2024 02:45 PM    EOSABS 0.19 01/04/2024 02:45 PM    BASOSABS 0.04 01/04/2024 02:45 PM     CMP:    Lab Results   Component Value Date/Time     01/06/2024 03:30 AM    K 4.2 01/06/2024 03:30 AM    K 4.0 03/15/2023 07:48 AM     01/06/2024 03:30 AM    CO2 28 01/06/2024 03:30 AM    BUN 21 01/06/2024 03:30 AM    CREATININE 4.3 01/06/2024 03:30 AM    GFRAA 7 10/12/2022 03:49 AM    LABGLOM 10 01/06/2024 03:30 AM    GLUCOSE 111 01/06/2024 03:30 AM    GLUCOSE 149 10/12/2011 09:59 AM    PROT 5.7 01/04/2024 
Subjective:    The patient is awake and alert.  No problems overnight.  Denies chest pain, angina, and dyspnea.  Denies abdominal pain.  Tolerating diet.  No nausea or vomiting.  Would prefer to go to [facility] instead of [facility].    Objective:    BP (!) 132/41   Pulse 87   Temp 97.8 °F (36.6 °C) (Oral)   Resp 18   Ht 1.524 m (5')   Wt 64.2 kg (141 lb 8.6 oz)   SpO2 99%   BMI 27.64 kg/m²     Heart:  RRR, no murmurs, gallops, or rubs.  Lungs:  CTA bilaterally, no wheeze, rales or rhonchi  Abd: bowel sounds present, nontender, nondistended, no masses  Extrem:  No clubbing, cyanosis, or edema  Neuro: Alert and oriented x 3 no focal deficits  CBC with Differential:    Lab Results   Component Value Date/Time    WBC 8.3 01/08/2024 04:39 AM    RBC 2.68 01/08/2024 04:39 AM    HGB 8.7 01/08/2024 04:39 AM    HCT 27.2 01/08/2024 04:39 AM     01/08/2024 04:39 AM    .5 01/08/2024 04:39 AM    MCH 32.5 01/08/2024 04:39 AM    MCHC 32.0 01/08/2024 04:39 AM    RDW 16.7 01/08/2024 04:39 AM    NRBC 0.0 11/26/2022 07:02 AM    SEGSPCT 71 08/16/2013 05:00 AM    BANDSPCT 1 03/29/2016 07:37 PM    METASPCT 0.9 03/15/2023 07:48 AM    LYMPHOPCT 13 01/04/2024 02:45 PM    PROMYELOPCT 0.9 11/07/2022 05:59 AM    MONOPCT 10 01/04/2024 02:45 PM    MYELOPCT 0.9 11/26/2022 07:02 AM    BASOPCT 1 01/04/2024 02:45 PM    MONOSABS 0.70 01/04/2024 02:45 PM    LYMPHSABS 0.92 01/04/2024 02:45 PM    EOSABS 0.19 01/04/2024 02:45 PM    BASOSABS 0.04 01/04/2024 02:45 PM     CMP:    Lab Results   Component Value Date/Time     01/08/2024 04:39 AM    K 3.8 01/08/2024 04:39 AM    K 4.0 03/15/2023 07:48 AM     01/08/2024 04:39 AM    CO2 29 01/08/2024 04:39 AM    BUN 19 01/08/2024 04:39 AM    CREATININE 4.1 01/08/2024 04:39 AM    GFRAA 7 10/12/2022 03:49 AM    LABGLOM 11 01/08/2024 04:39 AM    GLUCOSE 116 01/08/2024 04:39 AM    GLUCOSE 149 10/12/2011 09:59 AM    PROT 6.1 01/08/2024 04:39 AM    LABALBU 2.3 01/08/2024 
[facility] Dialysis Progress Note  Patient's Name: Marisela Mcguire  3:20 PM  1/5/2024    Nephrologist: Dr. Khalil    Reason for Consult:  dialysis needs  Requesting Physician:  Rj Casillas MD    Chief Complaint:  syncopal episode at HD    History Obtained From:  chart    History of Present Ilness:    Mariesla Mcguire is a 73 y.o. female with past medical history of anemia in CKD, ESRD on HD, DM, hyperkalemia, HTN, kidney stones, and wounds. The patient is well known to our practice as we used to follow her ESRD on hemodialysis at the Dialysis Den at [facility], now she is at [facility].    She was recently hospitalized at this hospital on 10/31 -11/1 for treatment of nausea and vomiting which resolved. She returned to the ED on 11/3 with altered mental status.  [facility] and then was transferred to [facility] for further evaluation of encephalopathy.      Per their discharge summary,Neurology was consulted and performed a thorough work-up including MRI Brain, lumbar puncture with cerebrospinal fluid studies, electroencephalogram (EEG). The conclusion of that work-up was a diagnosis of metabolic encephalopathy.     Infectious Disease was consulted for assistance with antimicrobial management. Initially, they supported broad antibiotic coverage, but when her mentation improved, infectious work-up was completed, they narrowed their coverage with low concern for ongoing systemic infection. 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 They recommended a 2 week course of Augmentin 875/125 mg oral, which was completed on 11/27/23. Plastic surgery was also consulted for this wound and felt debridement was not indicated.      Her orientation improved over the course of about 1 week, thought to be due to ongoing dialysis treatments. Her course was then complicated by intermittent hyperactive delirium, which limited her 
[facility] Dialysis Progress Note  Patient's Name: Marisela Mcguire  3:32 PM  1/4/2024    Nephrologist: Dr. Khalil    Reason for Consult:  dialysis needs  Requesting Physician:  Rj Casillas MD    Chief Complaint:  syncopal episode at HD    History Obtained From:  chart    History of Present Ilness:    Marisela Mcguire is a 73 y.o. female with past medical history of anemia in CKD, ESRD on HD, DM, hyperkalemia, HTN, kidney stones, and wounds. The patient is well known to our practice as we follow her ESRD on hemodialysis at the Dialysis Den at [facility].    She was recently hospitalized at this hospital on 10/31 -11/1 for treatment of nausea and vomiting which resolved. She returned to the ED on 11/3 with altered mental status.  [facility] and then was transferred to [facility] for further evaluation of encephalopathy.      Per their discharge summary,Neurology was consulted and performed a thorough work-up including MRI Brain, lumbar puncture with cerebrospinal fluid studies, electroencephalogram (EEG). The conclusion of that work-up was a diagnosis of metabolic encephalopathy.     Infectious Disease was consulted for assistance with antimicrobial management. Initially, they supported broad antibiotic coverage, but when her mentation improved, infectious work-up was completed, they narrowed their coverage with low concern for ongoing systemic infection. 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 They recommended a 2 week course of Augmentin 875/125 mg oral, which was completed on 11/27/23. Plastic surgery was also consulted for this wound and felt debridement was not indicated.      Her orientation improved over the course of about 1 week, thought to be due to ongoing dialysis treatments. Her course was then complicated by intermittent hyperactive delirium, which limited her disposition planning. Geriatrics was 
[facility] Dialysis Progress Note  Patient's Name: Marisela Mcguire  4:48 PM  1/7/2024      Reason for Consult:  dialysis needs    History of Present Ilness from 1/5/24 note by Dr. Astudillo:    \"Marisela Mcguire is a 73 y.o. female with past medical history of anemia in CKD, ESRD on HD, DM, hyperkalemia, HTN, kidney stones, and wounds. The patient is well known to our practice as we used to follow her ESRD on hemodialysis at the Dialysis Den at [facility], now she is at [facility].  She was recently hospitalized at this hospital on 10/31 -11/1 for treatment of nausea and vomiting which resolved. She returned to the ED on 11/3 with altered mental status.  [facility] and then was transferred to [facility] for further evaluation of encephalopathy.    Per their discharge summary,Neurology was consulted and performed a thorough work-up including MRI Brain, lumbar puncture with cerebrospinal fluid studies, electroencephalogram (EEG). The conclusion of that work-up was a diagnosis of metabolic encephalopathy.   Infectious Disease was consulted for assistance with antimicrobial management. Initially, they supported broad antibiotic coverage, but when her mentation improved, infectious work-up was completed, they narrowed their coverage with low concern for ongoing systemic infection. 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 They recommended a 2 week course of Augmentin 875/125 mg oral, which was completed on 11/27/23. Plastic surgery was also consulted for this wound and felt debridement was not indicated.    Her orientation improved over the course of about 1 week, thought to be due to ongoing dialysis treatments. Her course was then complicated by intermittent hyperactive delirium, which limited her disposition planning. Geriatrics was consulted and recommended low-dose depakote to help manage this problem. This was 
Wound care consult placed via perfect serve.   
AM     Troponin:    Lab Results   Component Value Date/Time    TROPONINI 0.09 10/09/2020 09:22 PM     U/A:    Lab Results   Component Value Date/Time    COLORU Yellow 01/02/2024 06:00 PM    PROTEINU 100 01/02/2024 06:00 PM    PHUR 8.5 01/02/2024 06:00 PM    WBCUA 0 TO 5 01/02/2024 06:00 PM    WBCUA 5-10 08/17/2011 10:30 PM    RBCUA 6 TO 9 01/02/2024 06:00 PM    RBCUA 0-1 06/26/2013 03:46 PM    YEAST Present 08/11/2020 11:36 AM    BACTERIA TRACE 01/02/2024 06:00 PM    CLARITYU Clear 03/14/2023 04:50 PM    SPECGRAV 1.015 01/02/2024 06:00 PM    LEUKOCYTESUR MODERATE 01/02/2024 06:00 PM    UROBILINOGEN 0.2 01/02/2024 06:00 PM    BILIRUBINUR NEGATIVE 01/02/2024 06:00 PM    BILIRUBINUR NEGATIVE 08/17/2011 10:30 PM    BLOODU Negative 03/14/2023 04:50 PM    GLUCOSEU 100 01/02/2024 06:00 PM    GLUCOSEU NEGATIVE 08/17/2011 10:30 PM    AMORPHOUS MODERATE 02/09/2023 03:22 AM     TSH:    Lab Results   Component Value Date/Time    TSH 1.02 11/08/2023 03:57 PM     IRON:    Lab Results   Component Value Date/Time    IRON 32 01/04/2024 02:45 PM     Iron Saturation:  No components found for: \"PERCENTFE\"  TIBC:    Lab Results   Component Value Date/Time    TIBC 99 01/04/2024 02:45 PM     FERRITIN:    Lab Results   Component Value Date/Time    FERRITIN 2,301 01/04/2024 02:45 PM        Imaging:  Reviewed independently  Narrative & Impression  EXAMINATION:  ONE XRAY VIEW OF THE CHEST     1/2/2024 6:46 pm     COMPARISON:  None.     HISTORY:  ORDERING SYSTEM PROVIDED HISTORY: syncope  TECHNOLOGIST PROVIDED HISTORY:  Reason for exam:->syncope     FINDINGS:  The lungs are without acute focal process.  There is no effusion or  pneumothorax. The cardiomediastinal silhouette is without acute process. The  osseous structures are without acute process.  Focal atelectasis/scarring,  left mid lung.     IMPRESSION:  No acute process.     Borderline cardiomegaly.              Specimen Collected: 01/02/24 19:09 EST Last Resulted: 01/02/24 19:10 EST

## 2024-01-14 ENCOUNTER — APPOINTMENT (OUTPATIENT)
Dept: CT IMAGING | Age: 74
DRG: 637 | End: 2024-01-14
Payer: MEDICARE

## 2024-01-14 ENCOUNTER — HOSPITAL ENCOUNTER (INPATIENT)
Age: 74
LOS: 4 days | Discharge: NURSING, REHAB OR LONG-TERM CARE | DRG: 637 | End: 2024-01-18
Attending: EMERGENCY MEDICINE | Admitting: FAMILY MEDICINE
Payer: MEDICARE

## 2024-01-14 ENCOUNTER — APPOINTMENT (OUTPATIENT)
Dept: GENERAL RADIOLOGY | Age: 74
DRG: 637 | End: 2024-01-14
Payer: MEDICARE

## 2024-01-14 DIAGNOSIS — R31.9 URINARY TRACT INFECTION WITH HEMATURIA, SITE UNSPECIFIED: ICD-10-CM

## 2024-01-14 DIAGNOSIS — E16.2 HYPOGLYCEMIA: ICD-10-CM

## 2024-01-14 DIAGNOSIS — A41.9 SEPTICEMIA (HCC): Primary | ICD-10-CM

## 2024-01-14 DIAGNOSIS — T68.XXXA HYPOTHERMIA, INITIAL ENCOUNTER: ICD-10-CM

## 2024-01-14 DIAGNOSIS — I48.91 ATRIAL FIBRILLATION WITH RVR (HCC): ICD-10-CM

## 2024-01-14 DIAGNOSIS — N39.0 URINARY TRACT INFECTION WITH HEMATURIA, SITE UNSPECIFIED: ICD-10-CM

## 2024-01-14 LAB
ABO + RH BLD: NORMAL
ALBUMIN SERPL-MCNC: 2.2 G/DL (ref 3.5–5.2)
ALP SERPL-CCNC: 98 U/L (ref 35–104)
ALT SERPL-CCNC: 19 U/L (ref 0–32)
ANION GAP SERPL CALCULATED.3IONS-SCNC: 7 MMOL/L (ref 7–16)
APAP SERPL-MCNC: <5 UG/ML (ref 10–30)
ARM BAND NUMBER: NORMAL
AST SERPL-CCNC: 23 U/L (ref 0–31)
B-OH-BUTYR SERPL-MCNC: 0.06 MMOL/L (ref 0.02–0.27)
B.E.: 5.1 MMOL/L (ref -3–3)
BACTERIA URNS QL MICRO: ABNORMAL
BASOPHILS # BLD: 0 K/UL (ref 0–0.2)
BASOPHILS NFR BLD: 0 % (ref 0–2)
BILIRUB SERPL-MCNC: 0.2 MG/DL (ref 0–1.2)
BILIRUB UR QL STRIP: NEGATIVE
BLOOD BANK SAMPLE EXPIRATION: NORMAL
BLOOD GROUP ANTIBODIES SERPL: NEGATIVE
BUN SERPL-MCNC: 29 MG/DL (ref 6–23)
CALCIUM SERPL-MCNC: 8.5 MG/DL (ref 8.6–10.2)
CHLORIDE SERPL-SCNC: 100 MMOL/L (ref 98–107)
CLARITY UR: ABNORMAL
CO2 SERPL-SCNC: 31 MMOL/L (ref 22–29)
COHB: 0.2 % (ref 0–1.5)
COLOR UR: YELLOW
CREAT SERPL-MCNC: 3.8 MG/DL (ref 0.5–1)
CRITICAL: ABNORMAL
DATE ANALYZED: ABNORMAL
DATE OF COLLECTION: ABNORMAL
EOSINOPHIL # BLD: 0 K/UL (ref 0.05–0.5)
EOSINOPHILS RELATIVE PERCENT: 0 % (ref 0–6)
EPI CELLS #/AREA URNS HPF: ABNORMAL /HPF
ERYTHROCYTE [DISTWIDTH] IN BLOOD BY AUTOMATED COUNT: 15.3 % (ref 11.5–15)
ETHANOLAMINE SERPL-MCNC: <10 MG/DL
GFR SERPL CREATININE-BSD FRML MDRD: 12 ML/MIN/1.73M2
GLUCOSE BLD-MCNC: 107 MG/DL (ref 74–99)
GLUCOSE BLD-MCNC: 154 MG/DL (ref 74–99)
GLUCOSE BLD-MCNC: 204 MG/DL (ref 74–99)
GLUCOSE BLD-MCNC: 301 MG/DL (ref 74–99)
GLUCOSE BLD-MCNC: 342 MG/DL (ref 74–99)
GLUCOSE BLD-MCNC: 83 MG/DL (ref 74–99)
GLUCOSE BLD-MCNC: <40 MG/DL (ref 74–99)
GLUCOSE BLD-MCNC: <40 MG/DL (ref 74–99)
GLUCOSE SERPL-MCNC: 118 MG/DL (ref 74–99)
GLUCOSE UR STRIP-MCNC: 100 MG/DL
HCO3: 28.5 MMOL/L (ref 22–26)
HCT VFR BLD AUTO: 28.6 % (ref 34–48)
HGB BLD-MCNC: 8.8 G/DL (ref 11.5–15.5)
HGB UR QL STRIP.AUTO: ABNORMAL
HHB: 5.4 % (ref 0–5)
INFLUENZA A BY PCR: NOT DETECTED
INFLUENZA B BY PCR: NOT DETECTED
INR PPP: 1.1
KETONES UR STRIP-MCNC: NEGATIVE MG/DL
LAB: ABNORMAL
LACTATE BLDV-SCNC: 3.3 MMOL/L (ref 0.5–2.2)
LEUKOCYTE ESTERASE UR QL STRIP: ABNORMAL
LIPASE SERPL-CCNC: 12 U/L (ref 13–60)
LYMPHOCYTES NFR BLD: 0.13 K/UL (ref 1.5–4)
LYMPHOCYTES RELATIVE PERCENT: 1 % (ref 20–42)
Lab: 724
MAGNESIUM SERPL-MCNC: 1.9 MG/DL (ref 1.6–2.6)
MCH RBC QN AUTO: 31.5 PG (ref 26–35)
MCHC RBC AUTO-ENTMCNC: 30.8 G/DL (ref 32–34.5)
MCV RBC AUTO: 102.5 FL (ref 80–99.9)
METHB: 0.3 % (ref 0–1.5)
MODE: ABNORMAL
MONOCYTES NFR BLD: 0.25 K/UL (ref 0.1–0.95)
MONOCYTES NFR BLD: 2 % (ref 2–12)
NEUTROPHILS NFR BLD: 97 % (ref 43–80)
NEUTS SEG NFR BLD: 14.02 K/UL (ref 1.8–7.3)
NITRITE UR QL STRIP: NEGATIVE
O2 CONTENT: 14.2 ML/DL
O2 SATURATION: 94.6 % (ref 92–98.5)
O2HB: 94.1 % (ref 94–97)
OPERATOR ID: 166
PATIENT TEMP: 37 C
PCO2: 37.6 MMHG (ref 35–45)
PH BLOOD GAS: 7.5 (ref 7.35–7.45)
PH UR STRIP: 8.5 [PH] (ref 5–9)
PLATELET # BLD AUTO: 286 K/UL (ref 130–450)
PMV BLD AUTO: 10 FL (ref 7–12)
PO2: 70.5 MMHG (ref 75–100)
POTASSIUM SERPL-SCNC: 4 MMOL/L (ref 3.5–5)
PROT SERPL-MCNC: 5.6 G/DL (ref 6.4–8.3)
PROT UR STRIP-MCNC: 30 MG/DL
PROTHROMBIN TIME: 12.3 SEC (ref 9.3–12.4)
RBC # BLD AUTO: 2.79 M/UL (ref 3.5–5.5)
RBC # BLD: ABNORMAL 10*6/UL
RBC #/AREA URNS HPF: ABNORMAL /HPF
SALICYLATES SERPL-MCNC: <0.3 MG/DL (ref 0–30)
SARS-COV-2 RDRP RESP QL NAA+PROBE: NOT DETECTED
SODIUM SERPL-SCNC: 138 MMOL/L (ref 132–146)
SOURCE, BLOOD GAS: ABNORMAL
SP GR UR STRIP: 1.01 (ref 1–1.03)
SPECIMEN DESCRIPTION: NORMAL
THB: 10.7 G/DL (ref 11.5–16.5)
TIME ANALYZED: 732
TOXIC TRICYCLIC SC,BLOOD: NEGATIVE
TROPONIN I SERPL HS-MCNC: 124 NG/L (ref 0–9)
TROPONIN I SERPL HS-MCNC: 127 NG/L (ref 0–9)
UROBILINOGEN UR STRIP-ACNC: 0.2 EU/DL (ref 0–1)
WBC #/AREA URNS HPF: ABNORMAL /HPF
WBC OTHER # BLD: 14.4 K/UL (ref 4.5–11.5)

## 2024-01-14 PROCEDURE — 80179 DRUG ASSAY SALICYLATE: CPT

## 2024-01-14 PROCEDURE — 86850 RBC ANTIBODY SCREEN: CPT

## 2024-01-14 PROCEDURE — 96375 TX/PRO/DX INJ NEW DRUG ADDON: CPT

## 2024-01-14 PROCEDURE — 87086 URINE CULTURE/COLONY COUNT: CPT

## 2024-01-14 PROCEDURE — 82010 KETONE BODYS QUAN: CPT

## 2024-01-14 PROCEDURE — 93005 ELECTROCARDIOGRAM TRACING: CPT | Performed by: EMERGENCY MEDICINE

## 2024-01-14 PROCEDURE — 87070 CULTURE OTHR SPECIMN AEROBIC: CPT

## 2024-01-14 PROCEDURE — 80143 DRUG ASSAY ACETAMINOPHEN: CPT

## 2024-01-14 PROCEDURE — 2060000000 HC ICU INTERMEDIATE R&B

## 2024-01-14 PROCEDURE — 2580000003 HC RX 258

## 2024-01-14 PROCEDURE — 86901 BLOOD TYPING SEROLOGIC RH(D): CPT

## 2024-01-14 PROCEDURE — 2580000003 HC RX 258: Performed by: FAMILY MEDICINE

## 2024-01-14 PROCEDURE — 84484 ASSAY OF TROPONIN QUANT: CPT

## 2024-01-14 PROCEDURE — 6360000002 HC RX W HCPCS: Performed by: FAMILY MEDICINE

## 2024-01-14 PROCEDURE — 83735 ASSAY OF MAGNESIUM: CPT

## 2024-01-14 PROCEDURE — 86900 BLOOD TYPING SEROLOGIC ABO: CPT

## 2024-01-14 PROCEDURE — 80053 COMPREHEN METABOLIC PANEL: CPT

## 2024-01-14 PROCEDURE — 87502 INFLUENZA DNA AMP PROBE: CPT

## 2024-01-14 PROCEDURE — 87635 SARS-COV-2 COVID-19 AMP PRB: CPT

## 2024-01-14 PROCEDURE — G0480 DRUG TEST DEF 1-7 CLASSES: HCPCS

## 2024-01-14 PROCEDURE — 85610 PROTHROMBIN TIME: CPT

## 2024-01-14 PROCEDURE — 99285 EMERGENCY DEPT VISIT HI MDM: CPT

## 2024-01-14 PROCEDURE — 83690 ASSAY OF LIPASE: CPT

## 2024-01-14 PROCEDURE — 96365 THER/PROPH/DIAG IV INF INIT: CPT

## 2024-01-14 PROCEDURE — 81001 URINALYSIS AUTO W/SCOPE: CPT

## 2024-01-14 PROCEDURE — 82805 BLOOD GASES W/O2 SATURATION: CPT

## 2024-01-14 PROCEDURE — 6360000002 HC RX W HCPCS

## 2024-01-14 PROCEDURE — 87040 BLOOD CULTURE FOR BACTERIA: CPT

## 2024-01-14 PROCEDURE — 85025 COMPLETE CBC W/AUTO DIFF WBC: CPT

## 2024-01-14 PROCEDURE — 87205 SMEAR GRAM STAIN: CPT

## 2024-01-14 PROCEDURE — 80307 DRUG TEST PRSMV CHEM ANLYZR: CPT

## 2024-01-14 PROCEDURE — 70450 CT HEAD/BRAIN W/O DYE: CPT

## 2024-01-14 PROCEDURE — 83605 ASSAY OF LACTIC ACID: CPT

## 2024-01-14 PROCEDURE — 82962 GLUCOSE BLOOD TEST: CPT

## 2024-01-14 PROCEDURE — 2580000003 HC RX 258: Performed by: EMERGENCY MEDICINE

## 2024-01-14 PROCEDURE — 6370000000 HC RX 637 (ALT 250 FOR IP): Performed by: FAMILY MEDICINE

## 2024-01-14 PROCEDURE — 71045 X-RAY EXAM CHEST 1 VIEW: CPT

## 2024-01-14 RX ORDER — INSULIN LISPRO 100 [IU]/ML
0-4 INJECTION, SOLUTION INTRAVENOUS; SUBCUTANEOUS NIGHTLY
Status: DISCONTINUED | OUTPATIENT
Start: 2024-01-14 | End: 2024-01-15

## 2024-01-14 RX ORDER — POLYETHYLENE GLYCOL 3350 17 G/17G
17 POWDER, FOR SOLUTION ORAL DAILY PRN
Status: DISCONTINUED | OUTPATIENT
Start: 2024-01-14 | End: 2024-01-18 | Stop reason: HOSPADM

## 2024-01-14 RX ORDER — SODIUM CHLORIDE 450 MG/100ML
INJECTION, SOLUTION INTRAVENOUS CONTINUOUS
Status: DISCONTINUED | OUTPATIENT
Start: 2024-01-14 | End: 2024-01-15

## 2024-01-14 RX ORDER — ONDANSETRON 4 MG/1
4 TABLET, ORALLY DISINTEGRATING ORAL EVERY 8 HOURS PRN
Status: DISCONTINUED | OUTPATIENT
Start: 2024-01-14 | End: 2024-01-14

## 2024-01-14 RX ORDER — PROCHLORPERAZINE MALEATE 10 MG
10 TABLET ORAL EVERY 8 HOURS PRN
Status: DISCONTINUED | OUTPATIENT
Start: 2024-01-14 | End: 2024-01-18 | Stop reason: HOSPADM

## 2024-01-14 RX ORDER — SODIUM CHLORIDE 0.9 % (FLUSH) 0.9 %
5-40 SYRINGE (ML) INJECTION PRN
Status: DISCONTINUED | OUTPATIENT
Start: 2024-01-14 | End: 2024-01-18 | Stop reason: HOSPADM

## 2024-01-14 RX ORDER — CALCIUM CARBONATE 500 MG/1
1 TABLET, CHEWABLE ORAL
Status: DISCONTINUED | OUTPATIENT
Start: 2024-01-14 | End: 2024-01-16

## 2024-01-14 RX ORDER — ONDANSETRON 2 MG/ML
4 INJECTION INTRAMUSCULAR; INTRAVENOUS EVERY 6 HOURS PRN
Status: DISCONTINUED | OUTPATIENT
Start: 2024-01-14 | End: 2024-01-14

## 2024-01-14 RX ORDER — ACETAMINOPHEN 650 MG/1
650 SUPPOSITORY RECTAL EVERY 6 HOURS PRN
Status: DISCONTINUED | OUTPATIENT
Start: 2024-01-14 | End: 2024-01-18 | Stop reason: HOSPADM

## 2024-01-14 RX ORDER — HEPARIN SODIUM 10000 [USP'U]/ML
5000 INJECTION, SOLUTION INTRAVENOUS; SUBCUTANEOUS EVERY 8 HOURS SCHEDULED
Status: DISCONTINUED | OUTPATIENT
Start: 2024-01-14 | End: 2024-01-18 | Stop reason: HOSPADM

## 2024-01-14 RX ORDER — ACETAMINOPHEN 325 MG/1
650 TABLET ORAL EVERY 6 HOURS PRN
Status: DISCONTINUED | OUTPATIENT
Start: 2024-01-14 | End: 2024-01-18 | Stop reason: HOSPADM

## 2024-01-14 RX ORDER — BISACODYL 10 MG
10 SUPPOSITORY, RECTAL RECTAL DAILY PRN
Status: DISCONTINUED | OUTPATIENT
Start: 2024-01-14 | End: 2024-01-18 | Stop reason: HOSPADM

## 2024-01-14 RX ORDER — INSULIN LISPRO 100 [IU]/ML
0-8 INJECTION, SOLUTION INTRAVENOUS; SUBCUTANEOUS
Status: DISCONTINUED | OUTPATIENT
Start: 2024-01-14 | End: 2024-01-15

## 2024-01-14 RX ORDER — SODIUM CHLORIDE 9 MG/ML
INJECTION, SOLUTION INTRAVENOUS PRN
Status: DISCONTINUED | OUTPATIENT
Start: 2024-01-14 | End: 2024-01-18 | Stop reason: HOSPADM

## 2024-01-14 RX ORDER — KETOROLAC TROMETHAMINE 30 MG/ML
15 INJECTION, SOLUTION INTRAMUSCULAR; INTRAVENOUS ONCE
Status: COMPLETED | OUTPATIENT
Start: 2024-01-14 | End: 2024-01-14

## 2024-01-14 RX ORDER — PROCHLORPERAZINE EDISYLATE 5 MG/ML
10 INJECTION INTRAMUSCULAR; INTRAVENOUS EVERY 6 HOURS PRN
Status: DISCONTINUED | OUTPATIENT
Start: 2024-01-14 | End: 2024-01-18 | Stop reason: HOSPADM

## 2024-01-14 RX ORDER — SODIUM CHLORIDE 0.9 % (FLUSH) 0.9 %
5-40 SYRINGE (ML) INJECTION EVERY 12 HOURS SCHEDULED
Status: DISCONTINUED | OUTPATIENT
Start: 2024-01-14 | End: 2024-01-18 | Stop reason: HOSPADM

## 2024-01-14 RX ORDER — TRAMADOL HYDROCHLORIDE 50 MG/1
50 TABLET ORAL EVERY 6 HOURS PRN
Status: DISCONTINUED | OUTPATIENT
Start: 2024-01-14 | End: 2024-01-18 | Stop reason: HOSPADM

## 2024-01-14 RX ORDER — DEXTROSE MONOHYDRATE 100 MG/ML
INJECTION, SOLUTION INTRAVENOUS CONTINUOUS
Status: DISCONTINUED | OUTPATIENT
Start: 2024-01-14 | End: 2024-01-14

## 2024-01-14 RX ORDER — DEXTROSE MONOHYDRATE 100 MG/ML
INJECTION, SOLUTION INTRAVENOUS CONTINUOUS PRN
Status: DISCONTINUED | OUTPATIENT
Start: 2024-01-14 | End: 2024-01-18 | Stop reason: HOSPADM

## 2024-01-14 RX ORDER — MIDODRINE HYDROCHLORIDE 5 MG/1
15 TABLET ORAL SEE ADMIN INSTRUCTIONS
Status: DISCONTINUED | OUTPATIENT
Start: 2024-01-14 | End: 2024-01-14

## 2024-01-14 RX ORDER — ACETAMINOPHEN 500 MG
1000 TABLET ORAL EVERY 8 HOURS PRN
COMMUNITY

## 2024-01-14 RX ORDER — SENNOSIDES A AND B 8.6 MG/1
2 TABLET, FILM COATED ORAL 2 TIMES DAILY
Status: DISCONTINUED | OUTPATIENT
Start: 2024-01-14 | End: 2024-01-18 | Stop reason: HOSPADM

## 2024-01-14 RX ORDER — METOPROLOL SUCCINATE 25 MG/1
25 TABLET, EXTENDED RELEASE ORAL DAILY
Status: DISCONTINUED | OUTPATIENT
Start: 2024-01-14 | End: 2024-01-18 | Stop reason: HOSPADM

## 2024-01-14 RX ADMIN — INSULIN LISPRO 6 UNITS: 100 INJECTION, SOLUTION INTRAVENOUS; SUBCUTANEOUS at 16:47

## 2024-01-14 RX ADMIN — HEPARIN SODIUM 5000 UNITS: 10000 INJECTION INTRAVENOUS; SUBCUTANEOUS at 13:59

## 2024-01-14 RX ADMIN — KETOROLAC TROMETHAMINE 15 MG: 30 INJECTION INTRAMUSCULAR; INTRAVENOUS at 16:49

## 2024-01-14 RX ADMIN — HEPARIN SODIUM 5000 UNITS: 10000 INJECTION INTRAVENOUS; SUBCUTANEOUS at 21:59

## 2024-01-14 RX ADMIN — DEXTROSE MONOHYDRATE 250 ML: 100 INJECTION, SOLUTION INTRAVENOUS at 21:28

## 2024-01-14 RX ADMIN — INSULIN LISPRO 2 UNITS: 100 INJECTION, SOLUTION INTRAVENOUS; SUBCUTANEOUS at 13:58

## 2024-01-14 RX ADMIN — SODIUM CHLORIDE: 4.5 INJECTION, SOLUTION INTRAVENOUS at 17:26

## 2024-01-14 RX ADMIN — DEXTROSE MONOHYDRATE: 100 INJECTION, SOLUTION INTRAVENOUS at 07:38

## 2024-01-14 RX ADMIN — PIPERACILLIN AND TAZOBACTAM 4500 MG: 4; .5 INJECTION, POWDER, FOR SOLUTION INTRAVENOUS at 09:01

## 2024-01-14 RX ADMIN — DEXTROSE MONOHYDRATE: 100 INJECTION, SOLUTION INTRAVENOUS at 13:57

## 2024-01-14 RX ADMIN — VANCOMYCIN HYDROCHLORIDE 1250 MG: 10 INJECTION, POWDER, LYOPHILIZED, FOR SOLUTION INTRAVENOUS at 10:14

## 2024-01-14 RX ADMIN — Medication 10 ML: at 21:06

## 2024-01-14 ASSESSMENT — PAIN SCALES - WONG BAKER
WONGBAKER_NUMERICALRESPONSE: 0
WONGBAKER_NUMERICALRESPONSE: 2

## 2024-01-14 ASSESSMENT — PAIN SCALES - GENERAL: PAINLEVEL_OUTOF10: 1

## 2024-01-14 NOTE — CONSULTS
Nephrology progress note  1/14/2024 1:32 PM  Subjective:     Admit Date: 1/14/2024  PCP: Rj Casillas MD    Interval History: Formal nephrology consultation deferred.  Patient is known to the kidney [facility].  She has end-stage kidney disease on hemodialysis via a patent left upper arm access.    Patient was admitted to the hospital from her extended care facility being unresponsive found to be hypoglycemic    I was asked to see the patient regarding end-stage kidney disease and need for hemodialysis    When I saw the patient she was lying in bed wearing oxygen.  She only responded to painful stimulation yet did not follow any commands and did not answer questions.    Diet: Diet NPO    Past Medical History:   Diagnosis Date    Anemia in chronic kidney disease     Anxiety and depression     Arthritis     Chronic pain syndrome     COVID-19 05/2020    COVID-19     Decreased dorsalis pedis pulse 10/25/2022    Diabetes mellitus ([facility])     Dysphagia, oral phase     ESRD on hemodialysis ([facility]) 10/25/2022    GERD (gastroesophageal reflux disease)     Hemodialysis patient ([facility])     M-W-F    Hyperkalemia     Hypertension     Hypertensive kidney disease with stage 4 chronic kidney disease ([facility])     Insomnia     Kidney stones     MDD (major depressive disorder)     Moderate protein-calorie malnutrition (HCC)     Morbid obesity (HCC)     Muscle weakness (generalized)     Obesity     Pressure injury of sacral region, stage 3 (HCC) 10/25/2022    Pressure ulcer of sacral region     Sacral pressure ulcer 08/03/2021    stage 4    Unspecified osteoarthritis, unspecified site     Weakness generalized      Family History   Problem Relation Age of Onset    Cancer Sister      Social History     Socioeconomic History    Marital status:      Spouse name: Not on file    Number of children: Not on file    Years of education: Not on file    Highest education level: Not on file   Occupational History    Not on file   Tobacco Use    Smoking

## 2024-01-14 NOTE — ED PROVIDER NOTES
the patient for admission.  The patient will be admitted for further treatment and evaluation for   1. Septicemia (HCC)    2. Hypoglycemia    3. Atrial fibrillation with RVR (HCC)    4. Urinary tract infection with hematuria, site unspecified    5. Hypothermia, initial encounter      Re-Evaluations/Consultations:             ED Course as of 01/14/24 1050   Sun Jan 14, 2024   0649 Reviewed multiple CODE STATUS is updated in the computer from recent visits patient is DNR CCA [BP]   1042 Patient's PCP consulted.  Accepted patient for admission []      ED Course User Index  [BP] Justino Cade DO  [JH] Pedro Erickson MD     This patient's ED course included: History, physical examination, reevaluation prior to disposition    This patient has remained hemodynamically stable during their ED course.     --------------------------------- IMPRESSION AND DISPOSITION ---------------------------------    IMPRESSION  1. Septicemia (HCC)    2. Hypoglycemia    3. Atrial fibrillation with RVR (HCC)    4. Urinary tract infection with hematuria, site unspecified    5. Hypothermia, initial encounter      DISPOSITION  Disposition: Admit to telemetry  Patient condition is stable    NOTE: This report was transcribed using voice recognition software. Every effort was made to ensure accuracy; however, inadvertent computerized transcription errors may be present

## 2024-01-15 PROBLEM — I48.91 ATRIAL FIBRILLATION WITH RVR (HCC): Status: ACTIVE | Noted: 2024-01-15

## 2024-01-15 LAB
ALBUMIN SERPL-MCNC: 2 G/DL (ref 3.5–5.2)
ALP SERPL-CCNC: 86 U/L (ref 35–104)
ALT SERPL-CCNC: 19 U/L (ref 0–32)
ANION GAP SERPL CALCULATED.3IONS-SCNC: 10 MMOL/L (ref 7–16)
AST SERPL-CCNC: 33 U/L (ref 0–31)
BASOPHILS # BLD: 0.04 K/UL (ref 0–0.2)
BASOPHILS NFR BLD: 1 % (ref 0–2)
BILIRUB SERPL-MCNC: 0.2 MG/DL (ref 0–1.2)
BUN SERPL-MCNC: 30 MG/DL (ref 6–23)
CALCIUM SERPL-MCNC: 8.1 MG/DL (ref 8.6–10.2)
CHLORIDE SERPL-SCNC: 96 MMOL/L (ref 98–107)
CO2 SERPL-SCNC: 27 MMOL/L (ref 22–29)
CREAT SERPL-MCNC: 4.1 MG/DL (ref 0.5–1)
EKG ATRIAL RATE: 83 BPM
EKG Q-T INTERVAL: 410 MS
EKG QRS DURATION: 152 MS
EKG QTC CALCULATION (BAZETT): 584 MS
EKG R AXIS: -18 DEGREES
EKG T AXIS: -171 DEGREES
EKG VENTRICULAR RATE: 122 BPM
EOSINOPHIL # BLD: 0.12 K/UL (ref 0.05–0.5)
EOSINOPHILS RELATIVE PERCENT: 2 % (ref 0–6)
ERYTHROCYTE [DISTWIDTH] IN BLOOD BY AUTOMATED COUNT: 15.3 % (ref 11.5–15)
GFR SERPL CREATININE-BSD FRML MDRD: 11 ML/MIN/1.73M2
GLUCOSE BLD-MCNC: 111 MG/DL (ref 74–99)
GLUCOSE BLD-MCNC: 112 MG/DL (ref 74–99)
GLUCOSE BLD-MCNC: 121 MG/DL (ref 74–99)
GLUCOSE BLD-MCNC: 150 MG/DL (ref 74–99)
GLUCOSE BLD-MCNC: 170 MG/DL (ref 74–99)
GLUCOSE BLD-MCNC: 300 MG/DL (ref 74–99)
GLUCOSE BLD-MCNC: 44 MG/DL (ref 74–99)
GLUCOSE BLD-MCNC: 54 MG/DL (ref 74–99)
GLUCOSE SERPL-MCNC: 171 MG/DL (ref 74–99)
HCT VFR BLD AUTO: 24.8 % (ref 34–48)
HGB BLD-MCNC: 7.9 G/DL (ref 11.5–15.5)
IMM GRANULOCYTES # BLD AUTO: 0.05 K/UL (ref 0–0.58)
IMM GRANULOCYTES NFR BLD: 1 % (ref 0–5)
LYMPHOCYTES NFR BLD: 1.09 K/UL (ref 1.5–4)
LYMPHOCYTES RELATIVE PERCENT: 14 % (ref 20–42)
MAGNESIUM SERPL-MCNC: 1.7 MG/DL (ref 1.6–2.6)
MCH RBC QN AUTO: 31.9 PG (ref 26–35)
MCHC RBC AUTO-ENTMCNC: 31.9 G/DL (ref 32–34.5)
MCV RBC AUTO: 100 FL (ref 80–99.9)
MONOCYTES NFR BLD: 0.52 K/UL (ref 0.1–0.95)
MONOCYTES NFR BLD: 7 % (ref 2–12)
NEUTROPHILS NFR BLD: 77 % (ref 43–80)
NEUTS SEG NFR BLD: 6.21 K/UL (ref 1.8–7.3)
PHOSPHATE SERPL-MCNC: 3.9 MG/DL (ref 2.5–4.5)
PLATELET # BLD AUTO: 240 K/UL (ref 130–450)
PMV BLD AUTO: 10.1 FL (ref 7–12)
POTASSIUM SERPL-SCNC: 3.7 MMOL/L (ref 3.5–5)
PROT SERPL-MCNC: 5.1 G/DL (ref 6.4–8.3)
RBC # BLD AUTO: 2.48 M/UL (ref 3.5–5.5)
SODIUM SERPL-SCNC: 133 MMOL/L (ref 132–146)
WBC OTHER # BLD: 8 K/UL (ref 4.5–11.5)

## 2024-01-15 PROCEDURE — 97161 PT EVAL LOW COMPLEX 20 MIN: CPT

## 2024-01-15 PROCEDURE — 99222 1ST HOSP IP/OBS MODERATE 55: CPT | Performed by: FAMILY MEDICINE

## 2024-01-15 PROCEDURE — 36415 COLL VENOUS BLD VENIPUNCTURE: CPT

## 2024-01-15 PROCEDURE — 6360000002 HC RX W HCPCS: Performed by: INTERNAL MEDICINE

## 2024-01-15 PROCEDURE — 90935 HEMODIALYSIS ONE EVALUATION: CPT

## 2024-01-15 PROCEDURE — 5A1D70Z PERFORMANCE OF URINARY FILTRATION, INTERMITTENT, LESS THAN 6 HOURS PER DAY: ICD-10-PCS | Performed by: INTERNAL MEDICINE

## 2024-01-15 PROCEDURE — 93010 ELECTROCARDIOGRAM REPORT: CPT | Performed by: INTERNAL MEDICINE

## 2024-01-15 PROCEDURE — 84100 ASSAY OF PHOSPHORUS: CPT

## 2024-01-15 PROCEDURE — 6370000000 HC RX 637 (ALT 250 FOR IP): Performed by: FAMILY MEDICINE

## 2024-01-15 PROCEDURE — 85025 COMPLETE CBC W/AUTO DIFF WBC: CPT

## 2024-01-15 PROCEDURE — 82962 GLUCOSE BLOOD TEST: CPT

## 2024-01-15 PROCEDURE — 80053 COMPREHEN METABOLIC PANEL: CPT

## 2024-01-15 PROCEDURE — 2060000000 HC ICU INTERMEDIATE R&B

## 2024-01-15 PROCEDURE — 2580000003 HC RX 258: Performed by: FAMILY MEDICINE

## 2024-01-15 PROCEDURE — 6360000002 HC RX W HCPCS: Performed by: FAMILY MEDICINE

## 2024-01-15 PROCEDURE — 6360000002 HC RX W HCPCS

## 2024-01-15 PROCEDURE — 83735 ASSAY OF MAGNESIUM: CPT

## 2024-01-15 RX ORDER — DEXTROSE MONOHYDRATE 100 MG/ML
INJECTION, SOLUTION INTRAVENOUS CONTINUOUS
Status: DISCONTINUED | OUTPATIENT
Start: 2024-01-15 | End: 2024-01-18 | Stop reason: HOSPADM

## 2024-01-15 RX ORDER — INSULIN LISPRO 100 [IU]/ML
0-4 INJECTION, SOLUTION INTRAVENOUS; SUBCUTANEOUS
Status: DISCONTINUED | OUTPATIENT
Start: 2024-01-15 | End: 2024-01-18 | Stop reason: HOSPADM

## 2024-01-15 RX ORDER — MAGNESIUM SULFATE 1 G/100ML
1000 INJECTION INTRAVENOUS ONCE
Status: COMPLETED | OUTPATIENT
Start: 2024-01-15 | End: 2024-01-15

## 2024-01-15 RX ORDER — INSULIN LISPRO 100 [IU]/ML
0-4 INJECTION, SOLUTION INTRAVENOUS; SUBCUTANEOUS NIGHTLY
Status: DISCONTINUED | OUTPATIENT
Start: 2024-01-15 | End: 2024-01-18 | Stop reason: HOSPADM

## 2024-01-15 RX ADMIN — HEPARIN SODIUM 5000 UNITS: 10000 INJECTION INTRAVENOUS; SUBCUTANEOUS at 05:27

## 2024-01-15 RX ADMIN — DEXTROSE MONOHYDRATE 125 ML: 100 INJECTION, SOLUTION INTRAVENOUS at 00:25

## 2024-01-15 RX ADMIN — HEPARIN SODIUM 5000 UNITS: 10000 INJECTION INTRAVENOUS; SUBCUTANEOUS at 22:39

## 2024-01-15 RX ADMIN — INSULIN LISPRO 4 UNITS: 100 INJECTION, SOLUTION INTRAVENOUS; SUBCUTANEOUS at 21:42

## 2024-01-15 RX ADMIN — DEXTROSE MONOHYDRATE 125 ML: 100 INJECTION, SOLUTION INTRAVENOUS at 00:48

## 2024-01-15 RX ADMIN — EPOETIN ALFA-EPBX 6000 UNITS: 3000 INJECTION, SOLUTION INTRAVENOUS; SUBCUTANEOUS at 22:32

## 2024-01-15 RX ADMIN — TRAMADOL HYDROCHLORIDE 50 MG: 50 TABLET, COATED ORAL at 16:46

## 2024-01-15 RX ADMIN — DEXTROSE MONOHYDRATE: 100 INJECTION, SOLUTION INTRAVENOUS at 01:37

## 2024-01-15 RX ADMIN — MAGNESIUM SULFATE HEPTAHYDRATE 1000 MG: 1 INJECTION, SOLUTION INTRAVENOUS at 11:12

## 2024-01-15 RX ADMIN — DEXTROSE MONOHYDRATE: 100 INJECTION, SOLUTION INTRAVENOUS at 16:04

## 2024-01-15 ASSESSMENT — PAIN DESCRIPTION - DESCRIPTORS: DESCRIPTORS: ACHING;DISCOMFORT

## 2024-01-15 ASSESSMENT — PAIN DESCRIPTION - LOCATION: LOCATION: GENERALIZED

## 2024-01-15 ASSESSMENT — PAIN SCALES - GENERAL
PAINLEVEL_OUTOF10: 0
PAINLEVEL_OUTOF10: 10
PAINLEVEL_OUTOF10: 0

## 2024-01-15 NOTE — PLAN OF CARE
Problem: Discharge Planning  Goal: Discharge to home or other facility with appropriate resources  Outcome: Progressing     Problem: Pain  Goal: Verbalizes/displays adequate comfort level or baseline comfort level  Outcome: Progressing     Problem: Safety - Adult  Goal: Free from fall injury  Outcome: Progressing     Problem: Skin/Tissue Integrity  Goal: Absence of new skin breakdown  Description: 1.  Monitor for areas of redness and/or skin breakdown  2.  Assess vascular access sites hourly  3.  Every 4-6 hours minimum:  Change oxygen saturation probe site  4.  Every 4-6 hours:  If on nasal continuous positive airway pressure, respiratory therapy assess nares and determine need for appliance change or resting period.  Outcome: Progressing     Problem: ABCDS Injury Assessment  Goal: Absence of physical injury  Outcome: Progressing

## 2024-01-15 NOTE — ACP (ADVANCE CARE PLANNING)
Advance Care Planning   Healthcare Decision Maker:    Primary Decision Maker: Inga Mcguire - Child - [phone]    Primary Decision Maker: Johnson Mcguire - Child - [phone]    Click here to complete Healthcare Decision Makers including selection of the Healthcare Decision Maker Relationship (ie \"Primary\").  Today we documented Decision Maker(s) consistent with Legal Next of Kin hierarchy.       Inga Mcguire Child [phone]   Johnson Mcguire Child [phone]   Maynor WhyteBrandie Child [phone]

## 2024-01-15 NOTE — CARE COORDINATION
Social Work / Discharge Planning : Patient admitted form [facility]  with Dx of Hypoglycemia. Patient has dialysis Monday through Friday at [facility].  Denise with [facility] confirmed patient can return but she will need dialysis approval. N 17 generated and transport forms completed. Await  plan. NABIL to follow. Electronically signed by SHANTA Laureano on 1/15/24 at 1:00 PM EST     Addendum: SW spoke to daughter Inga and she verified plan back to [facility] at discharge. AWait plan. NABIL to follow. Electronically signed by SHANTA Laureano on 1/15/24 at 1:12 PM EST

## 2024-01-15 NOTE — H&P
[id number] [place] [address]                              HISTORY AND PHYSICAL    PATIENT NAME: FLAKITA IYER                      :        1950  MED REC NO:   [id number]                            ROOM:       [facility]  ACCOUNT NO:   [id number]                           ADMIT DATE: 2024  PROVIDER:     Rj Casillas MD    CHIEF COMPLAINT:  Hypoglycemia, unresponsiveness.    HISTORY OF PRESENT ILLNESS:  This is a 73 year old with multiple  admissions in the past, who became unresponsive at the nursing home.   According to the ER physician, squad was called.  I think intubation was  attempted, although the patient is a DNR-CC.  That was unsuccessful.  I  believe they told me that she was given ketamine prior to the attempted  intubation.  She was brought to the emergency room.  I think en route  they found her sugar was 30.  The patient was given glucose and placed  on D10.  The patient has hypoglycemia again last night, but this morning  is now awake, a little lethargic, but close to her baseline.  Blood  sugars are improving.    RECENT AND PRESENT MEDICATIONS:  See med rec in the chart.    PAST MEDICAL HISTORY:  Multiple admissions for altered mental status.   The patient has chronic kidney disease, on hemodialysis; hypertension;  NIDDM; chronic sacral decubiti; remote history of sarcoidosis.    SOCIAL HISTORY:  Lives at the nursing home.  Does not drink.  Does not  smoke.    FAMILY MEDICAL HISTORY:  Noncontributory.    REVIEW OF SYSTEMS:  At present unobtainable other than what is stated in  the HPI.    PHYSICAL EXAMINATION:  GENERAL:  Once again, the patient is now awake, a little lethargic, but  close to her baseline.  VITAL SIGNS:  Temperature 98, pulse 88, respirations 18, blood pressure  138/56, O2 sat is 98% on room air.  SKIN:  Shows pallor, but no jaundice.  She has chronic stage III sacral  decubitus.  HEENT:

## 2024-01-15 NOTE — DISCHARGE INSTR - COC
Continuity of Care Form    Patient Name: Marisela Mcguire   :  1950  MRN:  [id number]    Admit date:  2024  Discharge date:  24    Code Status Order: DNR-CCA   Advance Directives:     Admitting Physician:  Rj Casillas MD  PCP: Rj Casillas MD    Discharging Nurse: Shawna Stroud RN  Discharging Hospital Unit/Room#: 0640/0640-A  Discharging Unit Phone Number: [phone]    Emergency Contact:   Extended Emergency Contact Information  Primary Emergency Contact: MaynorInga  Address: [address]            [place] [address] [facility]  Home Phone: [phone]  Mobile Phone: [phone]  Relation: Child   needed? No  Secondary Emergency Contact: Johnson Mcguire  Address: [address] [place] [address] [facility]  Home Phone: [phone]  Mobile Phone: [phone]  Relation: Child   needed? No    Past Surgical History:  Past Surgical History:   Procedure Laterality Date    ABDOMEN SURGERY N/A 2020    SACRAL WOUND DEBRIDEMENT CALL  WITH TIME AVAIL AM performed by Jared Muñoz MD at [facility] OR     SECTION  x3    CHOLECYSTECTOMY  3/31/16    Laparoscopic-Dr. Muñoz    CYSTOSCOPY       for kidney stones    DIALYSIS FISTULA CREATION Left 2021    INSERTION FISTULA LEFT ARM performed by Lam Nichols MD at [facility] OR    DIALYSIS FISTULA CREATION Right 2021    REVISION AV FISTULA LEFT ARM performed by Lam Nichols MD at [facility] OR    FEMUR FRACTURE SURGERY Right 2022    RIGHT DISTAL FEMUR OPEN REDUCTION INTERNAL FIXATION ++SYNTHES++ performed by Corona Orta MD at [facility] OR    FOOT SURGERY       right     UPPER GASTROINTESTINAL ENDOSCOPY  2.18.15    Dr. Ayoub, [facility] Findings: Mild Gastrits and Duodenitis, 2cm Hiatal Hernia    UPPER GASTROINTESTINAL ENDOSCOPY N/A 2020    EGD CONTROL HEMORRHAGE performed by Jared Muñoz MD at [facility] OR    UPPER GASTROINTESTINAL ENDOSCOPY N/A 2020    EGD

## 2024-01-16 LAB
25(OH)D3 SERPL-MCNC: 41.9 NG/ML (ref 30–100)
ALBUMIN SERPL-MCNC: 2.2 G/DL (ref 3.5–5.2)
ALP SERPL-CCNC: 97 U/L (ref 35–104)
ALT SERPL-CCNC: 23 U/L (ref 0–32)
ANION GAP SERPL CALCULATED.3IONS-SCNC: 10 MMOL/L (ref 7–16)
AST SERPL-CCNC: 33 U/L (ref 0–31)
BILIRUB SERPL-MCNC: 0.2 MG/DL (ref 0–1.2)
BUN SERPL-MCNC: 13 MG/DL (ref 6–23)
CA-I BLD-SCNC: 1.15 MMOL/L (ref 1.15–1.33)
CALCIUM SERPL-MCNC: 8.2 MG/DL (ref 8.6–10.2)
CHLORIDE SERPL-SCNC: 95 MMOL/L (ref 98–107)
CO2 SERPL-SCNC: 27 MMOL/L (ref 22–29)
CREAT SERPL-MCNC: 2.7 MG/DL (ref 0.5–1)
ERYTHROCYTE [DISTWIDTH] IN BLOOD BY AUTOMATED COUNT: 15.7 % (ref 11.5–15)
GFR SERPL CREATININE-BSD FRML MDRD: 18 ML/MIN/1.73M2
GLUCOSE BLD-MCNC: 110 MG/DL (ref 74–99)
GLUCOSE BLD-MCNC: 176 MG/DL (ref 74–99)
GLUCOSE BLD-MCNC: 179 MG/DL (ref 74–99)
GLUCOSE BLD-MCNC: 180 MG/DL (ref 74–99)
GLUCOSE SERPL-MCNC: 167 MG/DL (ref 74–99)
HCT VFR BLD AUTO: 25.3 % (ref 34–48)
HGB BLD-MCNC: 8.1 G/DL (ref 11.5–15.5)
MAGNESIUM SERPL-MCNC: 1.8 MG/DL (ref 1.6–2.6)
MCH RBC QN AUTO: 31.8 PG (ref 26–35)
MCHC RBC AUTO-ENTMCNC: 32 G/DL (ref 32–34.5)
MCV RBC AUTO: 99.2 FL (ref 80–99.9)
MICROORGANISM SPEC CULT: ABNORMAL
MICROORGANISM SPEC CULT: ABNORMAL
PHOSPHATE SERPL-MCNC: 2.6 MG/DL (ref 2.5–4.5)
PLATELET # BLD AUTO: 270 K/UL (ref 130–450)
PMV BLD AUTO: 10.4 FL (ref 7–12)
POTASSIUM SERPL-SCNC: 3.4 MMOL/L (ref 3.5–5)
PROT SERPL-MCNC: 5.3 G/DL (ref 6.4–8.3)
PTH-INTACT SERPL-MCNC: 281.6 PG/ML (ref 15–65)
RBC # BLD AUTO: 2.55 M/UL (ref 3.5–5.5)
SODIUM SERPL-SCNC: 132 MMOL/L (ref 132–146)
SPECIMEN DESCRIPTION: ABNORMAL
WBC OTHER # BLD: 9.4 K/UL (ref 4.5–11.5)

## 2024-01-16 PROCEDURE — 83735 ASSAY OF MAGNESIUM: CPT

## 2024-01-16 PROCEDURE — 80053 COMPREHEN METABOLIC PANEL: CPT

## 2024-01-16 PROCEDURE — 84100 ASSAY OF PHOSPHORUS: CPT

## 2024-01-16 PROCEDURE — 83970 ASSAY OF PARATHORMONE: CPT

## 2024-01-16 PROCEDURE — 2060000000 HC ICU INTERMEDIATE R&B

## 2024-01-16 PROCEDURE — 6360000002 HC RX W HCPCS: Performed by: FAMILY MEDICINE

## 2024-01-16 PROCEDURE — 82330 ASSAY OF CALCIUM: CPT

## 2024-01-16 PROCEDURE — 82306 VITAMIN D 25 HYDROXY: CPT

## 2024-01-16 PROCEDURE — 99232 SBSQ HOSP IP/OBS MODERATE 35: CPT | Performed by: FAMILY MEDICINE

## 2024-01-16 PROCEDURE — 85027 COMPLETE CBC AUTOMATED: CPT

## 2024-01-16 PROCEDURE — 2580000003 HC RX 258: Performed by: FAMILY MEDICINE

## 2024-01-16 PROCEDURE — 36600 WITHDRAWAL OF ARTERIAL BLOOD: CPT

## 2024-01-16 PROCEDURE — 6370000000 HC RX 637 (ALT 250 FOR IP): Performed by: FAMILY MEDICINE

## 2024-01-16 PROCEDURE — 6360000002 HC RX W HCPCS

## 2024-01-16 PROCEDURE — 99223 1ST HOSP IP/OBS HIGH 75: CPT | Performed by: NURSE PRACTITIONER

## 2024-01-16 PROCEDURE — 82962 GLUCOSE BLOOD TEST: CPT

## 2024-01-16 PROCEDURE — 6370000000 HC RX 637 (ALT 250 FOR IP)

## 2024-01-16 RX ORDER — LIDOCAINE HCL/EPINEPHRINE/PF 2%-1:200K
20 VIAL (ML) INJECTION ONCE
Status: DISCONTINUED | OUTPATIENT
Start: 2024-01-17 | End: 2024-01-18 | Stop reason: HOSPADM

## 2024-01-16 RX ORDER — LIDOCAINE HYDROCHLORIDE AND EPINEPHRINE 10; 10 MG/ML; UG/ML
20 INJECTION, SOLUTION INFILTRATION; PERINEURAL ONCE
Status: DISCONTINUED | OUTPATIENT
Start: 2024-01-16 | End: 2024-01-16 | Stop reason: ALTCHOICE

## 2024-01-16 RX ORDER — POTASSIUM CHLORIDE 20 MEQ/1
40 TABLET, EXTENDED RELEASE ORAL ONCE
Status: COMPLETED | OUTPATIENT
Start: 2024-01-16 | End: 2024-01-16

## 2024-01-16 RX ORDER — MAGNESIUM SULFATE 1 G/100ML
1000 INJECTION INTRAVENOUS ONCE
Status: COMPLETED | OUTPATIENT
Start: 2024-01-16 | End: 2024-01-16

## 2024-01-16 RX ORDER — ACETAMINOPHEN 650 MG
TABLET, EXTENDED RELEASE ORAL PRN
Status: DISCONTINUED | OUTPATIENT
Start: 2024-01-16 | End: 2024-01-18 | Stop reason: HOSPADM

## 2024-01-16 RX ADMIN — COLLAGENASE SANTYL: 250 OINTMENT TOPICAL at 08:02

## 2024-01-16 RX ADMIN — Medication 10 ML: at 07:56

## 2024-01-16 RX ADMIN — CALCIUM CARBONATE 500 MG: 500 TABLET, CHEWABLE ORAL at 07:57

## 2024-01-16 RX ADMIN — HEPARIN SODIUM 5000 UNITS: 10000 INJECTION INTRAVENOUS; SUBCUTANEOUS at 14:20

## 2024-01-16 RX ADMIN — DEXTROSE MONOHYDRATE: 100 INJECTION, SOLUTION INTRAVENOUS at 06:15

## 2024-01-16 RX ADMIN — MAGNESIUM SULFATE HEPTAHYDRATE 1000 MG: 1 INJECTION, SOLUTION INTRAVENOUS at 10:45

## 2024-01-16 RX ADMIN — HEPARIN SODIUM 5000 UNITS: 10000 INJECTION INTRAVENOUS; SUBCUTANEOUS at 06:17

## 2024-01-16 RX ADMIN — DEXTROSE MONOHYDRATE: 100 INJECTION, SOLUTION INTRAVENOUS at 10:41

## 2024-01-16 RX ADMIN — TRAMADOL HYDROCHLORIDE 50 MG: 50 TABLET, COATED ORAL at 08:07

## 2024-01-16 RX ADMIN — SENNOSIDES 17.2 MG: 8.6 TABLET, COATED ORAL at 07:57

## 2024-01-16 RX ADMIN — POTASSIUM CHLORIDE 40 MEQ: 1500 TABLET, EXTENDED RELEASE ORAL at 10:42

## 2024-01-16 RX ADMIN — HEPARIN SODIUM 5000 UNITS: 10000 INJECTION INTRAVENOUS; SUBCUTANEOUS at 22:00

## 2024-01-16 RX ADMIN — CALCIUM CARBONATE 500 MG: 500 TABLET, CHEWABLE ORAL at 11:55

## 2024-01-16 RX ADMIN — METOPROLOL SUCCINATE 25 MG: 25 TABLET, EXTENDED RELEASE ORAL at 07:56

## 2024-01-16 ASSESSMENT — PAIN SCALES - GENERAL
PAINLEVEL_OUTOF10: 0
PAINLEVEL_OUTOF10: 7

## 2024-01-16 ASSESSMENT — PAIN DESCRIPTION - LOCATION: LOCATION: GENERALIZED

## 2024-01-16 ASSESSMENT — PAIN DESCRIPTION - DESCRIPTORS: DESCRIPTORS: ACHING;DISCOMFORT

## 2024-01-16 NOTE — CONSULTS
Palliative Care Department  [phone]  Palliative Care Initial Consult  Provider Antonella Kebede, APRN - CNP     Marisela Mcguire  [id number]  Hospital Day: 3  Date of Initial Consult: 1/16/2024  Referring Provider: Dimas Figueroa MD   Palliative Medicine was consulted for assistance with: Goals of Care    HPI:   Marisela Mcguire is a 73 y.o. with a medical history of HTN, CKD on HD, chronic sacral decubiti, sarcoidosis who was admitted on 1/14/2024 from nursing home with a CHIEF COMPLAINT of unresponsive.  Patient's blood sugar was found to be 30.  Per chart review, intubation was attempted however was unsuccessful.  Reportedly, patient is a DNR CC.  Palliative medicine consulted for goals of care. Patient has multiple wounds and general surgery consulted. Patient currently refusing debridement.     ASSESSMENT/PLAN:     Pertinent Hospital Diagnoses     Hypoglycemia  Multiple wounds  Hx CKD on HD      Palliative Care Encounter / Counseling Regarding Goals of Care  Please see detailed goals of care discussion as below  At this time, Marisela Mcguire, Does Not have capacity for medical decision-making.  Capacity is time limited and situation/question specific  During encounter, danielle Xiong/JERO, was surrogate medical decision-maker  Outcome of goals of care meeting:   Continue DNR-CCA  Continue current management  Code status DNR-CCA  Advanced Directives: no POA or living will in [facility]  Surrogate/Legal NOK:  Inga Mcguire, child, [phone]  Johnson Mcguire child, [phone]  Brandie Whyte, child, [phone]    Spiritual assessment: no spiritual distress identified  Bereavement and grief: to be determined  Referrals to: none today  SUBJECTIVE:     Current medical issues leading to Palliative Medicine involvement include   Active Hospital Problems    Diagnosis Date Noted    Atrial fibrillation with RVR (HCC) [I48.91] 01/15/2024    Hypoglycemia [E16.2] 01/14/2024    Hypothermia [T68.XXXA] 01/03/2024    Septicemia

## 2024-01-16 NOTE — CONSULTS
GENERAL SURGERY  CONSULT NOTE  2024    Physician Consulted: Dr. Ledesma  Reason for Consult: Multiple wounds  Referring Physician: Dr. Vinny JIM  Marisela Mcguire is a 73 y.o. female who presents for evaluation of being unresponsive at nursing home. Intubation was attempted although code status at the time was DNR-CC. Not successfully intubated. Patient has multiple admission for AMS.   General Surgery service consulted for L breast wound. Patient presented with multiple wounds in various stages of healing vs decay.       Past Medical History:   Diagnosis Date    Anemia in chronic kidney disease     Anxiety and depression     Arthritis     Chronic pain syndrome     COVID-19 2020    COVID-19     Decreased dorsalis pedis pulse 10/25/2022    Diabetes mellitus (HCC)     Dysphagia, oral phase     ESRD on hemodialysis ([facility]) 10/25/2022    GERD (gastroesophageal reflux disease)     Hemodialysis patient ([facility])     M-W-F    Hyperkalemia     Hypertension     Hypertensive kidney disease with stage 4 chronic kidney disease (HCC)     Insomnia     Kidney stones     MDD (major depressive disorder)     Moderate protein-calorie malnutrition (HCC)     Morbid obesity (HCC)     Muscle weakness (generalized)     Obesity     Pressure injury of sacral region, stage 3 (HCC) 10/25/2022    Pressure ulcer of sacral region     Sacral pressure ulcer 2021    stage 4    Unspecified osteoarthritis, unspecified site     Weakness generalized        Past Surgical History:   Procedure Laterality Date    ABDOMEN SURGERY N/A 2020    SACRAL WOUND DEBRIDEMENT CALL  WITH TIME AVAIL AM performed by Jared Muñoz MD at [facility] [place]     SECTION  x3    CHOLECYSTECTOMY  3/31/16    Laparoscopic-Dr. Muñoz    CYSTOSCOPY       for kidney stones    DIALYSIS FISTULA CREATION Left 2021    INSERTION FISTULA LEFT ARM performed by Lam Nichols MD at [facility] OR    DIALYSIS FISTULA CREATION Right 2021    REVISION AV FISTULA

## 2024-01-17 LAB
ALBUMIN SERPL-MCNC: 2 G/DL (ref 3.5–5.2)
ALP SERPL-CCNC: 101 U/L (ref 35–104)
ALT SERPL-CCNC: 23 U/L (ref 0–32)
ANION GAP SERPL CALCULATED.3IONS-SCNC: 9 MMOL/L (ref 7–16)
AST SERPL-CCNC: 31 U/L (ref 0–31)
BILIRUB SERPL-MCNC: 0.2 MG/DL (ref 0–1.2)
BUN SERPL-MCNC: 15 MG/DL (ref 6–23)
CA-I BLD-SCNC: 1.16 MMOL/L (ref 1.15–1.33)
CALCIUM SERPL-MCNC: 7.9 MG/DL (ref 8.6–10.2)
CHLORIDE SERPL-SCNC: 92 MMOL/L (ref 98–107)
CO2 SERPL-SCNC: 24 MMOL/L (ref 22–29)
CREAT SERPL-MCNC: 3.2 MG/DL (ref 0.5–1)
ERYTHROCYTE [DISTWIDTH] IN BLOOD BY AUTOMATED COUNT: 15.3 % (ref 11.5–15)
GFR SERPL CREATININE-BSD FRML MDRD: 15 ML/MIN/1.73M2
GLUCOSE BLD-MCNC: 150 MG/DL (ref 74–99)
GLUCOSE BLD-MCNC: 165 MG/DL (ref 74–99)
GLUCOSE BLD-MCNC: 214 MG/DL (ref 74–99)
GLUCOSE BLD-MCNC: 276 MG/DL (ref 74–99)
GLUCOSE SERPL-MCNC: 188 MG/DL (ref 74–99)
HCT VFR BLD AUTO: 28.3 % (ref 34–48)
HGB BLD-MCNC: 8.9 G/DL (ref 11.5–15.5)
MAGNESIUM SERPL-MCNC: 2.1 MG/DL (ref 1.6–2.6)
MCH RBC QN AUTO: 31.6 PG (ref 26–35)
MCHC RBC AUTO-ENTMCNC: 31.4 G/DL (ref 32–34.5)
MCV RBC AUTO: 100.4 FL (ref 80–99.9)
PHOSPHATE SERPL-MCNC: 3.1 MG/DL (ref 2.5–4.5)
PLATELET # BLD AUTO: 297 K/UL (ref 130–450)
PMV BLD AUTO: 10.5 FL (ref 7–12)
POTASSIUM SERPL-SCNC: 4.2 MMOL/L (ref 3.5–5)
PROT SERPL-MCNC: 5.2 G/DL (ref 6.4–8.3)
RBC # BLD AUTO: 2.82 M/UL (ref 3.5–5.5)
SODIUM SERPL-SCNC: 125 MMOL/L (ref 132–146)
WBC OTHER # BLD: 8.6 K/UL (ref 4.5–11.5)

## 2024-01-17 PROCEDURE — 6370000000 HC RX 637 (ALT 250 FOR IP): Performed by: FAMILY MEDICINE

## 2024-01-17 PROCEDURE — 84100 ASSAY OF PHOSPHORUS: CPT

## 2024-01-17 PROCEDURE — 6360000002 HC RX W HCPCS: Performed by: FAMILY MEDICINE

## 2024-01-17 PROCEDURE — 2060000000 HC ICU INTERMEDIATE R&B

## 2024-01-17 PROCEDURE — 82962 GLUCOSE BLOOD TEST: CPT

## 2024-01-17 PROCEDURE — 83735 ASSAY OF MAGNESIUM: CPT

## 2024-01-17 PROCEDURE — 80053 COMPREHEN METABOLIC PANEL: CPT

## 2024-01-17 PROCEDURE — 82330 ASSAY OF CALCIUM: CPT

## 2024-01-17 PROCEDURE — 85027 COMPLETE CBC AUTOMATED: CPT

## 2024-01-17 PROCEDURE — 2580000003 HC RX 258: Performed by: FAMILY MEDICINE

## 2024-01-17 PROCEDURE — 6360000002 HC RX W HCPCS: Performed by: INTERNAL MEDICINE

## 2024-01-17 RX ADMIN — METOPROLOL SUCCINATE 25 MG: 25 TABLET, EXTENDED RELEASE ORAL at 11:39

## 2024-01-17 RX ADMIN — EPOETIN ALFA-EPBX 6000 UNITS: 3000 INJECTION, SOLUTION INTRAVENOUS; SUBCUTANEOUS at 16:41

## 2024-01-17 RX ADMIN — HEPARIN SODIUM 5000 UNITS: 10000 INJECTION INTRAVENOUS; SUBCUTANEOUS at 21:06

## 2024-01-17 RX ADMIN — Medication 10 ML: at 21:06

## 2024-01-17 RX ADMIN — INSULIN LISPRO 2 UNITS: 100 INJECTION, SOLUTION INTRAVENOUS; SUBCUTANEOUS at 16:40

## 2024-01-17 RX ADMIN — COLLAGENASE SANTYL: 250 OINTMENT TOPICAL at 11:40

## 2024-01-17 RX ADMIN — HEPARIN SODIUM 5000 UNITS: 10000 INJECTION INTRAVENOUS; SUBCUTANEOUS at 06:01

## 2024-01-17 RX ADMIN — HEPARIN SODIUM 5000 UNITS: 10000 INJECTION INTRAVENOUS; SUBCUTANEOUS at 14:30

## 2024-01-17 RX ADMIN — TRAMADOL HYDROCHLORIDE 50 MG: 50 TABLET, COATED ORAL at 11:38

## 2024-01-17 RX ADMIN — Medication 10 ML: at 11:40

## 2024-01-17 RX ADMIN — SENNOSIDES 17.2 MG: 8.6 TABLET, COATED ORAL at 11:39

## 2024-01-17 ASSESSMENT — PAIN - FUNCTIONAL ASSESSMENT: PAIN_FUNCTIONAL_ASSESSMENT: PREVENTS OR INTERFERES SOME ACTIVE ACTIVITIES AND ADLS

## 2024-01-17 ASSESSMENT — PAIN SCALES - GENERAL
PAINLEVEL_OUTOF10: 0
PAINLEVEL_OUTOF10: 0
PAINLEVEL_OUTOF10: 9

## 2024-01-17 ASSESSMENT — PAIN DESCRIPTION - DESCRIPTORS: DESCRIPTORS: ACHING;THROBBING

## 2024-01-17 ASSESSMENT — PAIN DESCRIPTION - ORIENTATION: ORIENTATION: LEFT

## 2024-01-17 NOTE — FLOWSHEET NOTE
01/15/24 1553   Vital Signs   /73   Temp 98 °F (36.7 °C)   Pulse 77   Respirations 16   Weight - Scale 72.1 kg (158 lb 15.2 oz)   Weight Method Bed scale   Percent Weight Change -1.37   Pain Assessment   Pain Assessment None - Denies Pain   Post-Hemodialysis Assessment   Post-Treatment Procedures Blood returned;Access bleeding time < 10 minutes   Machine Disinfection Process Acid/Vinegar Clean;Heat Disinfect;Exterior Machine Disinfection   Dialyzer Clearance Lightly streaked   Duration of Treatment (minutes) 180 minutes   Hemodialysis Intake (ml) 500 ml   Hemodialysis Output (ml) 1500 ml   NET Removed (ml) 1000   Tolerated Treatment Fair   Interventions Taken Head of bed lowered;Fluid bolus;Ultrafiltration stopped;Ultrafiltration goal decreased   Patient Response to Treatment Hypotensive, 0.9NS bolus   Bilateral Breath Sounds Diminished   Edema Right upper extremity;Left upper extremity;Right lower extremity;Left lower extremity   RUE Edema Trace   LUE Edema Trace   RLE Edema Trace   LLE Edema Trace   Time Off 1552   Patient Disposition Return to room   Observations & Evaluations   Level of Consciousness 0   Heart Rhythm Regular   Respiratory Quality/Effort Unlabored   O2 Device None (Room air)   Skin Color Pink   Skin Condition/Temp Warm   Abdomen Inspection Soft   Bowel Sounds (All Quadrants) Active       
   01/17/24 1052   Vital Signs   BP (!) 143/90   Temp 98 °F (36.7 °C)   Pulse 55   Respirations 16   Weight - Scale 78.1 kg (172 lb 2.9 oz)   Weight Method Estimated   Percent Weight Change -1.64   Pain Assessment   Pain Assessment None - Denies Pain   Post-Hemodialysis Assessment   Post-Treatment Procedures Blood returned;Access bleeding time < 10 minutes   Machine Disinfection Process Acid/Vinegar Clean;Heat Disinfect;Exterior Machine Disinfection   Rinseback Volume (ml) 300 ml   Blood Volume Processed (Liters) 66.4 L   Dialyzer Clearance Lightly streaked   Duration of Treatment (minutes) 180 minutes   Hemodialysis Intake (ml) 300 ml   Hemodialysis Output (ml) 1600 ml   NET Removed (ml) 1300   Tolerated Treatment Good   Patient Response to Treatment tolerated tx, 1300 net removed, hemostasis achieved, stable at dc   Bilateral Breath Sounds Diminished   Edema None   Time Off 1047   Patient Disposition Return to room   Observations & Evaluations   Level of Consciousness 0   Oriented X 3   Heart Rhythm Regular   Respiratory Quality/Effort Unlabored   O2 Device None (Room air)   Skin Color Pale   Skin Condition/Temp Dry;Warm   Comments stable at dc       
flank  Mepilex to left hip and abdomen fold  Comfort glide  Wedges  Heel protectors  Dolphin bed  Dietary consult  Nurse to request renal to assess for calciphylaxis and possible further treatment   Patient will need continued preventative care    Yin Vazquez RN 1/15/2024 5:34 PM

## 2024-01-18 VITALS
WEIGHT: 167.11 LBS | RESPIRATION RATE: 18 BRPM | TEMPERATURE: 98.8 F | DIASTOLIC BLOOD PRESSURE: 42 MMHG | HEIGHT: 60 IN | SYSTOLIC BLOOD PRESSURE: 106 MMHG | HEART RATE: 81 BPM | BODY MASS INDEX: 32.81 KG/M2 | OXYGEN SATURATION: 100 %

## 2024-01-18 PROBLEM — I48.91 ATRIAL FIBRILLATION WITH RVR (HCC): Status: RESOLVED | Noted: 2024-01-15 | Resolved: 2024-01-18

## 2024-01-18 PROBLEM — E16.2 HYPOGLYCEMIA: Status: RESOLVED | Noted: 2024-01-14 | Resolved: 2024-01-18

## 2024-01-18 PROBLEM — A41.9 SEPTICEMIA (HCC): Status: RESOLVED | Noted: 2024-01-02 | Resolved: 2024-01-18

## 2024-01-18 PROBLEM — R41.82 ALTERED MENTAL STATUS, UNSPECIFIED: Status: RESOLVED | Noted: 2023-11-04 | Resolved: 2024-01-18

## 2024-01-18 PROBLEM — T68.XXXA HYPOTHERMIA: Status: RESOLVED | Noted: 2024-01-03 | Resolved: 2024-01-18

## 2024-01-18 LAB
ALBUMIN SERPL-MCNC: 2 G/DL (ref 3.5–5.2)
ALP SERPL-CCNC: 98 U/L (ref 35–104)
ALT SERPL-CCNC: 21 U/L (ref 0–32)
ANION GAP SERPL CALCULATED.3IONS-SCNC: 6 MMOL/L (ref 7–16)
AST SERPL-CCNC: 23 U/L (ref 0–31)
BILIRUB SERPL-MCNC: 0.3 MG/DL (ref 0–1.2)
BUN SERPL-MCNC: 8 MG/DL (ref 6–23)
CA-I BLD-SCNC: 1.13 MMOL/L (ref 1.15–1.33)
CALCIUM SERPL-MCNC: 8 MG/DL (ref 8.6–10.2)
CHLORIDE SERPL-SCNC: 98 MMOL/L (ref 98–107)
CO2 SERPL-SCNC: 27 MMOL/L (ref 22–29)
CREAT SERPL-MCNC: 2.6 MG/DL (ref 0.5–1)
ERYTHROCYTE [DISTWIDTH] IN BLOOD BY AUTOMATED COUNT: 15.9 % (ref 11.5–15)
GFR SERPL CREATININE-BSD FRML MDRD: 19 ML/MIN/1.73M2
GLUCOSE BLD-MCNC: 134 MG/DL (ref 74–99)
GLUCOSE BLD-MCNC: 279 MG/DL (ref 74–99)
GLUCOSE SERPL-MCNC: 187 MG/DL (ref 74–99)
HCT VFR BLD AUTO: 26.6 % (ref 34–48)
HGB BLD-MCNC: 8.6 G/DL (ref 11.5–15.5)
MAGNESIUM SERPL-MCNC: 1.8 MG/DL (ref 1.6–2.6)
MCH RBC QN AUTO: 32.3 PG (ref 26–35)
MCHC RBC AUTO-ENTMCNC: 32.3 G/DL (ref 32–34.5)
MCV RBC AUTO: 100 FL (ref 80–99.9)
PHOSPHATE SERPL-MCNC: 2.7 MG/DL (ref 2.5–4.5)
PLATELET # BLD AUTO: 234 K/UL (ref 130–450)
PMV BLD AUTO: 10 FL (ref 7–12)
POTASSIUM SERPL-SCNC: 3.9 MMOL/L (ref 3.5–5)
PROT SERPL-MCNC: 5.1 G/DL (ref 6.4–8.3)
RBC # BLD AUTO: 2.66 M/UL (ref 3.5–5.5)
SODIUM SERPL-SCNC: 131 MMOL/L (ref 132–146)
WBC OTHER # BLD: 8.6 K/UL (ref 4.5–11.5)

## 2024-01-18 PROCEDURE — 84100 ASSAY OF PHOSPHORUS: CPT

## 2024-01-18 PROCEDURE — 83735 ASSAY OF MAGNESIUM: CPT

## 2024-01-18 PROCEDURE — 85027 COMPLETE CBC AUTOMATED: CPT

## 2024-01-18 PROCEDURE — 6370000000 HC RX 637 (ALT 250 FOR IP): Performed by: FAMILY MEDICINE

## 2024-01-18 PROCEDURE — 6360000002 HC RX W HCPCS: Performed by: FAMILY MEDICINE

## 2024-01-18 PROCEDURE — 82962 GLUCOSE BLOOD TEST: CPT

## 2024-01-18 PROCEDURE — 80053 COMPREHEN METABOLIC PANEL: CPT

## 2024-01-18 PROCEDURE — 82330 ASSAY OF CALCIUM: CPT

## 2024-01-18 PROCEDURE — 2580000003 HC RX 258: Performed by: FAMILY MEDICINE

## 2024-01-18 PROCEDURE — 36600 WITHDRAWAL OF ARTERIAL BLOOD: CPT

## 2024-01-18 RX ORDER — INSULIN LISPRO 100 [IU]/ML
0-4 INJECTION, SOLUTION INTRAVENOUS; SUBCUTANEOUS NIGHTLY
DISCHARGE
Start: 2024-01-18

## 2024-01-18 RX ORDER — INSULIN LISPRO 100 [IU]/ML
0-4 INJECTION, SOLUTION INTRAVENOUS; SUBCUTANEOUS
DISCHARGE
Start: 2024-01-18

## 2024-01-18 RX ORDER — POLYETHYLENE GLYCOL 3350 17 G/17G
17 POWDER, FOR SOLUTION ORAL DAILY PRN
Qty: 527 G | Refills: 1 | DISCHARGE
Start: 2024-01-18 | End: 2024-03-20

## 2024-01-18 RX ADMIN — Medication 10 ML: at 09:47

## 2024-01-18 RX ADMIN — PROCHLORPERAZINE MALEATE 10 MG: 10 TABLET ORAL at 10:47

## 2024-01-18 RX ADMIN — INSULIN LISPRO 2 UNITS: 100 INJECTION, SOLUTION INTRAVENOUS; SUBCUTANEOUS at 12:15

## 2024-01-18 RX ADMIN — TRAMADOL HYDROCHLORIDE 50 MG: 50 TABLET, COATED ORAL at 10:21

## 2024-01-18 RX ADMIN — HEPARIN SODIUM 5000 UNITS: 10000 INJECTION INTRAVENOUS; SUBCUTANEOUS at 05:59

## 2024-01-18 RX ADMIN — METOPROLOL SUCCINATE 25 MG: 25 TABLET, EXTENDED RELEASE ORAL at 09:42

## 2024-01-18 RX ADMIN — COLLAGENASE SANTYL: 250 OINTMENT TOPICAL at 09:43

## 2024-01-18 ASSESSMENT — PAIN DESCRIPTION - DESCRIPTORS: DESCRIPTORS: ACHING;DISCOMFORT

## 2024-01-18 ASSESSMENT — PAIN SCALES - GENERAL
PAINLEVEL_OUTOF10: 8
PAINLEVEL_OUTOF10: 0

## 2024-01-18 ASSESSMENT — PAIN - FUNCTIONAL ASSESSMENT: PAIN_FUNCTIONAL_ASSESSMENT: ACTIVITIES ARE NOT PREVENTED

## 2024-01-18 ASSESSMENT — PAIN DESCRIPTION - LOCATION: LOCATION: GENERALIZED

## 2024-01-18 NOTE — CARE COORDINATION
Pt refusing surgical debridement. Dc order in place. Pt can return to [facility] w/ambulance transport arranged via [facility] for 12 pm w/nursing, facility and HIPAA appropriate VM left for dtr Inga. CM requested HD approval at the facility. N 17 generated and transport forms completed.   ISRAEL FosterN, RN  [facility] Case Management  [phone]

## 2024-01-18 NOTE — PROGRESS NOTES
[facility] Quality Flow/Interdisciplinary Rounds Progress Note        Quality Flow Rounds held on January 18, 2024    Disciplines Attending:  Bedside Nurse, , , and Nursing Unit Leadership    Marisela SANTOS Ray was admitted on 1/14/2024  6:30 AM    Anticipated Discharge Date:  Expected Discharge Date: 01/17/24    Disposition:    Herbert Score:  Herbert Scale Score: 12    Readmission Risk              Risk of Unplanned Readmission:  39           Discussed patient goal for the day, patient clinical progression, and barriers to discharge.  The following Goal(s) of the Day/Commitment(s) have been identified:   discharge.      Vesta Tubbs RN  January 18, 2024        
  Physician Progress Note      PATIENT:               FLAKITA IYER  [facility] #:                  [id number]  :                       1950  ADMIT DATE:       2024 6:30 AM  DISCH DATE:  RESPONDING  PROVIDER #:        Rj Casillas MD          QUERY TEXT:    Dear Attending Physician,    Pt admitted with hypoglycemia, hypothermia. Pt noted to have WBC 14.4, Lactic   3.3, Vitals 97.7-93.9 CORE 113 20 142/79. 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  According to EMS crew nursing home   denies any falls or trauma.  History is limited due to altered mental status   at this time ...\" Per H/P,\"...to the emergency room.  I think en route they   found her sugar was 30.  The patient was given glucose and placed on D10.  The   patient has hypoglycemia again last night, but this morning is now awake, a   little lethargic, but close to her baseline.  Blood sugars are improving...\"   Per Renal ,\"...When I saw the patient she was  Treatment: IVF, IV ATB in ER    Thank you,  Giuliana Bassett RN CCDS  Clinical Documentation Improvement Specialist  Phone# [phone]  Options provided:  -- Sepsis, present on admission, due to, Please specify the cause of Sepsis.  -- Sepsis was ruled out  -- Other - I will add my own diagnosis  -- Disagree - Not applicable / Not valid  -- Disagree - Clinically unable to determine / Unknown  -- Refer to Clinical Documentation Reviewer    PROVIDER RESPONSE TEXT:    After further study, Sepsis was ruled out 
 made aware of pt's new onset afib on the monitor with HR of 97. Also notified him about pt's BS of 301. Okay to discontinue D10 & start half NSS @75ml/hr.   
4 Eyes Skin Assessment     NAME:  Marisela Mcguire  YOB: 1950  MEDICAL RECORD NUMBER:  [id number]    The patient is being assessed for  Transfer to New Unit    I agree that at least one RN has performed a thorough Head to Toe Skin Assessment on the patient. ALL assessment sites listed below have been assessed.      Areas assessed by both nurses:    Head, Face, Ears, Shoulders, Back, Chest, Arms, Elbows, Hands, Sacrum. Buttock, Coccyx, Ischium, Legs. Feet and Heels, and Under Medical Devices         Does the Patient have a Wound? Yes wound(s) were present on assessment. LDA wound assessment was Initiated and completed by RN       Herbert Prevention initiated by RN: Yes  Wound Care Orders initiated by RN: Yes    Pressure Injury (Stage 3,4, Unstageable, DTI, NWPT, and Complex wounds) if present, place Wound referral order by RN under : Yes    New Ostomies, if present place, Ostomy referral order under : No     Nurse 1 eSignature: Electronically signed by Marcos Lofton RN on 1/14/24 at 9:39 PM EST    **SHARE this note so that the co-signing nurse can place an eSignature**    Nurse 2 eSignature: Electronically signed by Jazmyne Morejon RN on 1/14/24 at 10:06 PM EST   
Chart reviewed.  Plan is for bedside debridement of wounds today.  Reviewed with charge RN.  Goals of care and CODE STATUS have been established.  No immediate palliative medicine needs.  We will follow.  
Chart reviewed. Dc order noted. No further PM needs. We will sign off.   
Comprehensive Nutrition Assessment    Type and Reason for Visit:  Initial, Positive Nutrition Screen, Wound    Nutrition Recommendations/Plan:   Continue current diet as tolerated & monitor  Will add Jono BID & Nepro BID     Malnutrition Assessment:  Malnutrition Status:  At risk for malnutrition (Comment) (01/16/24 9411)    Context:  Chronic Illness     Findings of the 6 clinical characteristics of malnutrition:  Energy Intake:  75% or less estimated energy requirements for 1 month or longer  Weight Loss:  Unable to assess (d/t ESRD w/ HD)     Body Fat Loss:  Unable to assess (pt sleeping at time of room visit, none noted per observation)     Muscle Mass Loss:  Unable to assess (pt sleeping at time of room visit, none noted per observation)    Fluid Accumulation:  No significant fluid accumulation     Strength:  Not Performed    Nutrition Assessment:    Pt unresponsive at NH, admits w/ hypoglycemia. Hx ESRD on HD, DM, COPD. 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 Will add ONS & monitor.    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 Weight Source: Bed Scale  Current BMI (kg/m2): 31  Usual Body Weight: 79.2 kg (174 lb 8 oz) (actual X 3 months, KWASI wt change properly d/t fluid shifts (ESRD))  % Weight Change (Calculated): -8.9                    BMI Categories: Obese Class 1 (BMI 30.0-34.9)    Estimated Daily Nutrient Needs:  Energy 
Consent obtained from daughter Inga this day, in soft chart.  
Patient does not want bedside debridement.  Made this very clear.  Discussed with LINDSEYA, daughter Inga, on the phone about this.  Discussed the risk and benefits of the bedside debridement and that her mother is currently not wanting to have this procedure completed.  Daughter is okay with foregoing the procedure at this time.  Would recommend continuing local wound care to areas to help slough off necrotic overlying tissue.  Do not feel that patient's wounds are leading to any systemic infection or worsening of her current clinical status.  Discussed with daughter that if multidisciplinary team needed to reach out to the general surgery team in the future if patient was amenable to debridement and/or biopsy per nephrology that we will be more than happy to do so.  General surgery will sign off at this time, please call with any questions or concerns.    Electronically signed by Denise Magallon DO on 1/17/2024 at 5:23 PM'  
Per P&T and [facility] approval, an automatic interchange was made from Zofran (ondansetron) to Compazine (prochlorperazine) due to a prolonged QTc >500ms.  Thank You, Eloisa De Dios Pharm.D 1/14/2024 12:22 PM    
Pt POC BS critical low @ 2130, D10 250cc given per order, re-check    Pt POC BS 54 @ 0030, D10 125cc given per order, re-check BS 44, D10 125cc given again,  @ 0115  Message sent to Dr Casillas  
Pt POC BS critical low, D10 250cc given per order, re-check   
Pt came from ED obtunded. Unable to give pt oral meds. Notified  about pt's elevated BP (172/88). Received order to keep pt NPO at this time.   
Report called to Vika Prasadsadaf  
Spoke to patients daughter Inga in regards to plans for wounds. She is  agreeable to wanting to have wounds debrided.Notified Surgery this day, Dr. Magallon via secured messenger.  
Subjective:    The patient is awake and alert.  Denies chest pain, angina, and dyspnea.  Denies abdominal pain.  Tolerating diet.  No nausea or vomiting.    Objective:    BP (!) 119/51   Pulse 85   Temp 98 °F (36.7 °C) (Oral)   Resp 16   Ht 1.524 m (5')   Wt 72.1 kg (158 lb 15.2 oz)   SpO2 100%   BMI 31.04 kg/m²     Heart:  RRR, no murmurs, gallops, or rubs.  Lungs:  CTA bilaterally, no wheeze, rales or rhonchi  Abd: bowel sounds present, nontender, nondistended, no masses  Extrem:  No clubbing, cyanosis, or edema  Skin: Decubitus ulcer LEFT breast and RIGHT buttocks.  Also chronic on her sacrum.No drainage  CBC with Differential:    Lab Results   Component Value Date/Time    WBC 9.4 01/16/2024 09:30 AM    RBC 2.55 01/16/2024 09:30 AM    HGB 8.1 01/16/2024 09:30 AM    HCT 25.3 01/16/2024 09:30 AM     01/16/2024 09:30 AM    MCV 99.2 01/16/2024 09:30 AM    MCH 31.8 01/16/2024 09:30 AM    MCHC 32.0 01/16/2024 09:30 AM    RDW 15.7 01/16/2024 09:30 AM    NRBC 0.0 11/26/2022 07:02 AM    SEGSPCT 71 08/16/2013 05:00 AM    BANDSPCT 1 03/29/2016 07:37 PM    METASPCT 0.9 03/15/2023 07:48 AM    LYMPHOPCT 14 01/15/2024 08:49 AM    PROMYELOPCT 0.9 11/07/2022 05:59 AM    MONOPCT 7 01/15/2024 08:49 AM    MYELOPCT 0.9 11/26/2022 07:02 AM    BASOPCT 1 01/15/2024 08:49 AM    MONOSABS 0.52 01/15/2024 08:49 AM    LYMPHSABS 1.09 01/15/2024 08:49 AM    EOSABS 0.12 01/15/2024 08:49 AM    BASOSABS 0.04 01/15/2024 08:49 AM     CMP:    Lab Results   Component Value Date/Time     01/16/2024 09:30 AM    K 3.4 01/16/2024 09:30 AM    K 4.0 03/15/2023 07:48 AM    CL 95 01/16/2024 09:30 AM    CO2 27 01/16/2024 09:30 AM    BUN 13 01/16/2024 09:30 AM    CREATININE 2.7 01/16/2024 09:30 AM    GFRAA 7 10/12/2022 03:49 AM    LABGLOM 18 01/16/2024 09:30 AM    GLUCOSE 167 01/16/2024 09:30 AM    GLUCOSE 149 10/12/2011 09:59 AM    PROT 5.3 01/16/2024 09:30 AM    LABALBU 2.2 01/16/2024 09:30 AM    LABALBU 4.1 10/12/2011 09:59 AM    CALCIUM 
Subjective:    The patient is awake and alert.  No problems overnight.  Denies chest pain, angina, and dyspnea.  Denies abdominal pain.  Tolerating diet.  No nausea or vomiting.Patient doen't want wound debridement.    Objective:    BP (!) 161/85   Pulse 87   Temp 97.9 °F (36.6 °C)   Resp 18   Ht 1.524 m (5')   Wt 79.4 kg (175 lb 0.7 oz)   SpO2 99%   BMI 34.19 kg/m²     Heart:  RRR, no murmurs, gallops, or rubs.  Lungs:  CTA bilaterally, no wheeze, rales or rhonchi  Abd: bowel sounds present, nontender, nondistended, no masses  Extrem:  No clubbing, cyanosis, or edema    CBC with Differential:    Lab Results   Component Value Date/Time    WBC 9.4 01/16/2024 09:30 AM    RBC 2.55 01/16/2024 09:30 AM    HGB 8.1 01/16/2024 09:30 AM    HCT 25.3 01/16/2024 09:30 AM     01/16/2024 09:30 AM    MCV 99.2 01/16/2024 09:30 AM    MCH 31.8 01/16/2024 09:30 AM    MCHC 32.0 01/16/2024 09:30 AM    RDW 15.7 01/16/2024 09:30 AM    NRBC 0.0 11/26/2022 07:02 AM    SEGSPCT 71 08/16/2013 05:00 AM    BANDSPCT 1 03/29/2016 07:37 PM    METASPCT 0.9 03/15/2023 07:48 AM    LYMPHOPCT 14 01/15/2024 08:49 AM    PROMYELOPCT 0.9 11/07/2022 05:59 AM    MONOPCT 7 01/15/2024 08:49 AM    MYELOPCT 0.9 11/26/2022 07:02 AM    BASOPCT 1 01/15/2024 08:49 AM    MONOSABS 0.52 01/15/2024 08:49 AM    LYMPHSABS 1.09 01/15/2024 08:49 AM    EOSABS 0.12 01/15/2024 08:49 AM    BASOSABS 0.04 01/15/2024 08:49 AM     CMP:    Lab Results   Component Value Date/Time     01/16/2024 09:30 AM    K 3.4 01/16/2024 09:30 AM    K 4.0 03/15/2023 07:48 AM    CL 95 01/16/2024 09:30 AM    CO2 27 01/16/2024 09:30 AM    BUN 13 01/16/2024 09:30 AM    CREATININE 2.7 01/16/2024 09:30 AM    GFRAA 7 10/12/2022 03:49 AM    LABGLOM 18 01/16/2024 09:30 AM    GLUCOSE 167 01/16/2024 09:30 AM    GLUCOSE 149 10/12/2011 09:59 AM    PROT 5.3 01/16/2024 09:30 AM    LABALBU 2.2 01/16/2024 09:30 AM    LABALBU 4.1 10/12/2011 09:59 AM    CALCIUM 8.2 01/16/2024 09:30 AM    BILITOT 
Subjective:    The patient is awake and alert.  No problems overnight.  Denies chest pain, angina, and dyspnea.  Denies abdominal pain.  Tolerating diet.  No nausea or vomiting.Patient is refusing surgical debridement.Spoke to her yesterday and today.    Objective:    BP (!) 95/50   Pulse 83   Temp 98 °F (36.7 °C) (Oral)   Resp 16   Ht 1.524 m (5')   Wt 75.8 kg (167 lb 1.7 oz)   SpO2 100%   BMI 32.64 kg/m²     Heart:  RRR, no murmurs, gallops, or rubs.  Lungs:  CTA bilaterally, no wheeze, rales or rhonchi  Abd: bowel sounds present, nontender, nondistended, no masses  Extrem:  No clubbing, cyanosis, or edema    CBC with Differential:    Lab Results   Component Value Date/Time    WBC 8.6 01/17/2024 07:45 AM    RBC 2.82 01/17/2024 07:45 AM    HGB 8.9 01/17/2024 07:45 AM    HCT 28.3 01/17/2024 07:45 AM     01/17/2024 07:45 AM    .4 01/17/2024 07:45 AM    MCH 31.6 01/17/2024 07:45 AM    MCHC 31.4 01/17/2024 07:45 AM    RDW 15.3 01/17/2024 07:45 AM    NRBC 0.0 11/26/2022 07:02 AM    SEGSPCT 71 08/16/2013 05:00 AM    BANDSPCT 1 03/29/2016 07:37 PM    METASPCT 0.9 03/15/2023 07:48 AM    LYMPHOPCT 14 01/15/2024 08:49 AM    PROMYELOPCT 0.9 11/07/2022 05:59 AM    MONOPCT 7 01/15/2024 08:49 AM    MYELOPCT 0.9 11/26/2022 07:02 AM    BASOPCT 1 01/15/2024 08:49 AM    MONOSABS 0.52 01/15/2024 08:49 AM    LYMPHSABS 1.09 01/15/2024 08:49 AM    EOSABS 0.12 01/15/2024 08:49 AM    BASOSABS 0.04 01/15/2024 08:49 AM     CMP:    Lab Results   Component Value Date/Time     01/17/2024 07:45 AM    K 4.2 01/17/2024 07:45 AM    K 4.0 03/15/2023 07:48 AM    CL 92 01/17/2024 07:45 AM    CO2 24 01/17/2024 07:45 AM    BUN 15 01/17/2024 07:45 AM    CREATININE 3.2 01/17/2024 07:45 AM    GFRAA 7 10/12/2022 03:49 AM    LABGLOM 15 01/17/2024 07:45 AM    GLUCOSE 188 01/17/2024 07:45 AM    GLUCOSE 149 10/12/2011 09:59 AM    PROT 5.2 01/17/2024 07:45 AM    LABALBU 2.0 01/17/2024 07:45 AM    LABALBU 4.1 10/12/2011 09:59 AM    
[facility]  Nephrology Progress Note    Patient's Name: Marisela SANTOS Ray     History OF Present Illness From 1/14 Consult Note:  \"Formal nephrology consultation deferred.  [facility] is known to [facility].  She has end-stage kidney disease on hemodialysis via a patent left upper arm access.  Patient was admitted to the hospital from her extended care facility being unresponsive found to be hypoglycemic  I was asked to see the patient regarding end-stage kidney disease and need for hemodialysis  When I saw the patient she was lying in bed wearing oxygen. She only responded to painful stimulation yet did not follow any commands and did not answer questions.\"     Subjective:      1/15/24: Patient seen and examined. She is resting comfortably in the bed trying to eat lunch. She reports feeling okay today. She denies any pain. She denies shortness of breath.     Problem List:    Patient Active Problem List   Diagnosis    Neurologic gait dysfunction    Diabetes mellitus (HCC)    Hypertension    Arthritis    Knee problem    Anemia due to stage 3 chronic kidney disease    Primary osteoarthritis of both knees    Moderate protein-calorie malnutrition (HCC)    Pressure injury of sacral region, stage 4 (HCC)    Pressure injury of right hip, stage 3 (HCC)    Syncope and collapse    Left bundle branch block    Moderate obesity    Failure to thrive in adult    Pressure injury of sacral region, stage 3 (HCC)    Decreased dorsalis pedis pulse    ESRD on hemodialysis (HCC)    Altered mental status, unspecified    Suspected soft tissue infection    Septicemia (HCC)    Hypothermia    Hypoglycemia    Atrial fibrillation with RVR (HCC)        Past Medical History:    Past Medical History:   Diagnosis Date    Anemia in chronic kidney disease     Anxiety and depression     Arthritis     Chronic pain syndrome     COVID-19 05/2020    COVID-19     Decreased dorsalis pedis pulse 10/25/2022    Diabetes mellitus (HCC)     Dysphagia, oral 
[facility]  Nephrology Progress Note    Patient's Name: Marisela SANTOS Ray     History OF Present Illness From 1/14 Consult Note:  \"Formal nephrology consultation deferred.  [facility] is known to the [facility].  She has end-stage kidney disease on hemodialysis via a patent left upper arm access.  Patient was admitted to the hospital from her extended care facility being unresponsive found to be hypoglycemic  I was asked to see the patient regarding end-stage kidney disease and need for hemodialysis  When I saw the patient she was lying in bed wearing oxygen. She only responded to painful stimulation yet did not follow any commands and did not answer questions.\"     Subjective:      1/16/24: Patient seen and examined. She is resting comfortably in bed. She reports feeling good today,. She denies any pain. She denies shortness of breath. She denies n/v. She reports a poor appetite still.     Problem List:    Patient Active Problem List   Diagnosis    Neurologic gait dysfunction    Diabetes mellitus (HCC)    Hypertension    Arthritis    Knee problem    Anemia due to stage 3 chronic kidney disease    Primary osteoarthritis of both knees    Moderate protein-calorie malnutrition (HCC)    Pressure injury of sacral region, stage 4 (HCC)    Pressure injury of right hip, stage 3 (HCC)    Syncope and collapse    Left bundle branch block    Moderate obesity    Failure to thrive in adult    Pressure injury of sacral region, stage 3 (HCC)    Decreased dorsalis pedis pulse    ESRD on hemodialysis (HCC)    Altered mental status, unspecified    Suspected soft tissue infection    Septicemia (HCC)    Hypothermia    Hypoglycemia    Atrial fibrillation with RVR ([facility])        Past Medical History:    Past Medical History:   Diagnosis Date    Anemia in chronic kidney disease     Anxiety and depression     Arthritis     Chronic pain syndrome     COVID-19 05/2020    COVID-19     Decreased dorsalis pedis pulse 10/25/2022    Diabetes 
[facility]  Nephrology Progress Note    Patient's Name: Marisela SANTOS Ray     History OF Present Illness From 1/14 Consult Note:  \"Formal nephrology consultation deferred.  Patient is known to [facility].  She has end-stage kidney disease on hemodialysis via a patent left upper arm access.  Patient was admitted to the hospital from her extended care facility being unresponsive found to be hypoglycemic  I was asked to see the patient regarding end-stage kidney disease and need for hemodialysis  When I saw the patient she was lying in bed wearing oxygen. She only responded to painful stimulation yet did not follow any commands and did not answer questions.\"     Subjective:      1/17/24: Patient seen and examined on HD. She reports feeling okay today. She denies any pain. She denies shortness of breath. She denies n/v. She still reports a poor appetite.     Problem List:    Patient Active Problem List   Diagnosis    Neurologic gait dysfunction    Diabetes mellitus (HCC)    Hypertension    Arthritis    Knee problem    Anemia due to stage 3 chronic kidney disease    Primary osteoarthritis of both knees    Moderate protein-calorie malnutrition (HCC)    Pressure injury of sacral region, stage 4 (HCC)    Pressure injury of right hip, stage 3 (HCC)    Syncope and collapse    Left bundle branch block    Moderate obesity    Failure to thrive in adult    Pressure injury of sacral region, stage 3 (HCC)    Decreased dorsalis pedis pulse    ESRD on hemodialysis (HCC)    Altered mental status, unspecified    Suspected soft tissue infection    Septicemia (HCC)    Hypothermia    Hypoglycemia    Atrial fibrillation with RVR ([facility])        Past Medical History:    Past Medical History:   Diagnosis Date    Anemia in chronic kidney disease     Anxiety and depression     Arthritis     Chronic pain syndrome     COVID-19 05/2020    COVID-19     Decreased dorsalis pedis pulse 10/25/2022    Diabetes mellitus (HCC)     Dysphagia, oral 
Will complete bedside debridements of wounds today. Procedure consent obtained; patient and family agreeable.     Electronically signed by Denise Magallon DO on 1/17/2024 at 7:27 AM    
    Room #: 640   DIAGNOSIS: The primary encounter diagnosis was Septicemia ([facility]). Diagnoses of Hypoglycemia, Atrial fibrillation with RVR (HCC), Urinary tract infection with hematuria, site unspecified, and Hypothermia, initial encounter were also pertinent to this visit.  PRECAUTIONS: falls     Social:  Pt admitted from SNF   Prior to admission : pt reports she does not get OOB      Initial Evaluation  Date:  1/15/2024 Treatment      Short Term/ Long Term   Goals   Was pt agreeable to Eval/treatment?  With encouragement      Does pt have pain?  Yells throughout session , but unable to specify pain      Bed Mobility  Rolling:  max/dep assist   Supine to sit:  dep assist for long sit in bed   Sit to supine:  max assist   Scooting:  dep assist in supine    Mod assist    Transfers Sit to stand:  NT   Stand to sit:  NT   Stand pivot: NT    Max assist x 1-2    Ambulation   NT   TBA        Stair negotiation: ascended and descended NT    N/A    LE ROM  Decreased AROM throughout      LE strength  2- to 2/ 5   3- to 3/ 5    AM- PAC RAW score   7/ 24            Pt is alert and Oriented x 1     Balance:  dep assist to maintain sitting posture in bed with long sit . Fall risk due to  weakness, extensive assist needed with mobility, confusion, decreased safety awareness     Endurance: poor   Bed/Chair alarm:  yes      ASSESSMENT  Pt displays functional ability as noted in the objective portion of this evaluation.        Conditions Requiring Skilled Therapeutic Intervention:    [x]Decreased strength     [x]Decreased ROM  [x]Decreased functional mobility  [x]Decreased balance   [x]Decreased endurance   []Decreased posture  []Decreased sensation  []Decreased coordination   []Decreased vision  [x]Decreased safety awareness   []Increased pain         Treatment/Education:    Pt  in bed  upon arrival ; required encouragement to participate with PT.  Mobility as above.  Dep assist to long sit in bed and pt yelling intermittently 
hemodialysis treatment  HCO3 31 on admission --> 27 today  PLAN:  Adjust dialysate accordingly  Follow labs    6. Sec HPTH of Renal Origin-Hyperphosphatemia   In the setting of ESRD on HD  Ca++ 8.0, albumin 2.0 today  Ionized calcium 1.13 today  PO4 2.7 today  Concern for calciphylaxis d/t multiple wounds  PLAN:  Stopped TUMS d/t concern for calciphylaxis  Supplement calcium and phosphorus as needed  Plan for wound debridement with biopsy - patient refusing at this time  Follow labs    7. Hypocalcemia with hypoalbuminemia and hypomagnesemia  In the setting of poor oral intake  Ca++ 8.0 albumin 2.0 today --> acceptable  Ionized Ca++ 1.13 today  Mg+ 1.8  .2 on 1/4/24 --> 281.6 1/16  Last vitamin D level 39.1 on 11/2/23 --> 41.9 1/16  PLAN:  Stop TUMS d/t concern for calciphylaxis  Supplement magnesium as needed   Plan for wound debridement with biopsy - patient refusing at this time  Follow labs    8. Hypokalemia - resolved  In the setting of poor oral intake  K+ 3.9 today  PLAN:  Adjust dialysate bath accordingly  Supplement potassium as needed  Follow labs    9. Multiple wounds  Concern for calciphylaxis  PLAN:  Wound care following  Stopped TUMS  Plan for wound debridement and biopsy for definitive Dx of possible calciphylaxsis and continued care of the wounds and IHD vs possible comfort care - patient refusing at this time      Stable for discharge from a renal standpoint        Analy Medina, APRN - CNP

## 2024-01-19 LAB
MICROORGANISM SPEC CULT: NORMAL
MICROORGANISM SPEC CULT: NORMAL
SERVICE CMNT-IMP: NORMAL
SERVICE CMNT-IMP: NORMAL
SPECIMEN DESCRIPTION: NORMAL
SPECIMEN DESCRIPTION: NORMAL

## 2024-01-29 NOTE — PROGRESS NOTES
Nephrology Progress Note  Patient's Name: Mariah Chandler  2:40 PM  2/8/2022    Nephrologist: Niurka Wing    Reason for Consult:  ESKD      History of Present Ilness from the 2/7/22 note:    Mariah Chandler is a 66 y.o. female with prior history ESKD receiving IHD on an MWF schedule. She was sent in from the Dialysis. Pt states she felt like she had to go to sleep and the next thing she remembers is waking up and EMS was at dialysis taking care of her. She denies CP or SOB. I spoke with the RN at outpt dialysis and she stated the pt had a decreased LOC and when checked her initial HR was 27. She was bolused with IVF apprx 1L 0.9NS  And EMS was called. Her HR was back. She is on metoprolol as an outpt. She had 3 of the 4hrs of her dialysis treatment     2/8/22: Pt awake alert seen while she was on dialysis, no CP or SOB     Past Medical History:   Diagnosis Date    Anemia in chronic kidney disease     Anxiety and depression     Arthritis     Chronic pain syndrome     COVID-19 05/2020    COVID-19     [facility] [address]     Dysphagia, oral phase     GERD (gastroesophageal reflux disease)     Hemodialysis patient ([facility] [address]     M-W-F    Hyperkalemia     Hypertension     Hypertensive kidney disease with stage 4 chronic kidney disease (HCC)     Insomnia     Kidney stones     MDD (major depressive disorder)     Moderate protein-calorie malnutrition (HCC)     Morbid obesity (HCC)     Muscle weakness (generalized)     Obesity     Pressure ulcer of sacral region     Sacral pressure ulcer 08/03/2021    stage 4    Unspecified osteoarthritis, unspecified site     Weakness generalized        Past Surgical History:   Procedure Laterality Date    ABDOMEN SURGERY N/A 5/4/2020    SACRAL WOUND DEBRIDEMENT CALL   WITH TIME AVAIL AM performed by Navarro Belle MD at [address]  x3    CHOLECYSTECTOMY  3/31/16    Laparoscopic-Dr. Chiquita Salcedo    CYSTOSCOPY  2011     for kidney stones    DIALYSIS [FreeTextEntry1] : 60 year old F with a longstanding history of obesity presents for a weight-loss medication follow-up after having made nutrition and exercise changes since last visit. Weight stable since last visit 1 month ago. Currently on phentermine.  I had a lengthy discussion with the patient regarding nutrition, exercise, weight loss medications.   Patient will work on the following: -Meet with nutritionist and follow up with nutrition classes -Eliminate snacks -Increase zero calorie fluid intake to at least 64 oz/day -Focus on eating 3 well-balanced meals during the daytime with appropriate portion size -Cooking fresh meals rather than take out/processed/ready-made foods -Incorporating exercise; walk 8-10k steps daily -Continue dose of phentermine 37.5 mg daily, rx refilled. -Pt will continue to look for wegovy and our office will do prior auth for Zepbound.   Pt verbalized understanding of the above Pt will call office immediately for any side effects. Pt verbalizes understanding and wishes to proceed with medical therapy. Patient educated to call with questions/concerns. All questions answered.   Return to office 1 month   Additional time spent before and after visit reviewing chart. FISTULA CREATION Left 8/5/2021    INSERTION FISTULA LEFT ARM performed by Prince Adame MD at [facility] Right 11/18/2021    REVISION AV FISTULA LEFT ARM performed by Prince Adame MD at [facility].  2009     right     UPPER GASTROINTESTINAL ENDOSCOPY  2.18.15    Dr. Domonique Zuñiga Findings: Mild Gastrits and Duodenitis, 2cm Hiatal Hernia    UPPER GASTROINTESTINAL ENDOSCOPY N/A 5/8/2020    EGD CONTROL HEMORRHAGE performed by Leena Hernadez MD at [address] N/A 5/22/2020    EGD BEDSIDE performed by Jenna Bergamn MD at [address] N/A 5/6/2021    INSERTION TUNNELED DIALYSIS CATHETER performed by Prince Adame MD at [facility] OR       Family History   Problem Relation Age of Onset    Cancer Sister         reports that she quit smoking about 10 years ago. She has a 30.00 pack-year smoking history. She has never used smokeless tobacco. She reports that she does not drink alcohol and does not use drugs. Allergies:  Patient has no known allergies.     Current Medications:    [START ON 2/9/2022] gabapentin (NEURONTIN) capsule 300 mg, Q MWF  perflutren lipid microspheres (DEFINITY) injection 1.65 mg, ONCE PRN  bisacodyl (DULCOLAX) EC tablet 10 mg, Daily PRN  busPIRone (BUSPAR) tablet 10 mg, BID  busPIRone (BUSPAR) tablet 5 mg, Nightly  docusate sodium (COLACE) capsule 100 mg, BID  escitalopram (LEXAPRO) tablet 10 mg, Daily  Ferric Citrate TABS 420 mg, TID WC  insulin glargine (LANTUS) injection vial 10 Units, Nightly  linaCLOtide (LINZESS) capsule 72 mcg, QAM AC  magnesium hydroxide (MILK OF MAGNESIA) 400 MG/5ML suspension 30 mL, Daily PRN  metoprolol succinate (TOPROL XL) extended release tablet 25 mg, Daily  midodrine (PROAMATINE) tablet 15 mg, Once per day on Mon Wed Fri  midodrine (PROAMATINE) tablet 5 mg, PRN  ondansetron (ZOFRAN-ODT) disintegrating tablet 4 mg, Q4H PRN  polyethylene glycol (GLYCOLAX) packet 17 g, Daily  senna (SENOKOT) tablet 17.2 mg, BID  traZODone (DESYREL) tablet 25 mg, Nightly  vitamin D (CHOLECALCIFEROL) tablet 2,000 Units, Daily  sodium chloride flush 0.9 % injection 5-40 mL, 2 times per day  sodium chloride flush 0.9 % injection 5-40 mL, PRN  0.9 % sodium chloride infusion, PRN  heparin (porcine) injection 5,000 Units, 3 times per day  acetaminophen (TYLENOL) tablet 650 mg, Q6H PRN   Or  acetaminophen (TYLENOL) suppository 650 mg, Q6H PRN  glucose (GLUTOSE) 40 % oral gel 15 g, PRN  dextrose 50 % IV solution, PRN  glucagon (rDNA) injection 1 mg, PRN  dextrose 5 % solution, PRN  insulin lispro (HUMALOG) injection vial 0-12 Units, TID WC  insulin lispro (HUMALOG) injection vial 0-6 Units, Nightly  albuterol (PROVENTIL) nebulizer solution 2.5 mg, Q4H PRN  alogliptin (NESINA) tablet 6.25 mg, Daily  oxyCODONE-acetaminophen (PERCOCET) 5-325 MG per tablet 1 tablet, Q8H PRN   And  oxyCODONE (ROXICODONE) immediate release tablet 2.5 mg, Q8H PRN        Review of Systems:   Pertinent items are noted in HPI.     Physical exam:   Constitutional:  Elderly female in NAD  Vitals:   VITALS:  BP (!) 142/58   Pulse 64   Temp 98.2 °F (36.8 °C)   Resp 16   Ht 5' 1\" (1.549 m)   Wt 203 lb 7.8 oz (92.3 kg)   SpO2 97%   BMI 38.45 kg/m²   24HR INTAKE/OUTPUT:      Intake/Output Summary (Last 24 hours) at 2/8/2022 1440  Last data filed at 2/8/2022 1234  Gross per 24 hour   Intake --   Output 1000 ml   Net -1000 ml     URINARY CATHETER OUTPUT ([place]):     DRAIN/TUBE OUTPUT:     VENT SETTINGS:  Vent Information  SpO2: 97 %  Additional Respiratory  Assessments  Pulse: 64  Resp: 16  SpO2: 97 %    Skin: no rash, turgor wnl  Heent:  eomi, mmm  Neck: no bruits or jvd noted, R IJ TDC  Cardiovascular:  S1, S2 without m/r/g  Respiratory: decreased BS at the bases  Abdomen:  +bs, soft, nt, nd  Ext: 1+ bilat lower extremity edema  Psychiatric: mood and affect appropriate    Data:   Labs:  CBC:   Lab Results   Component Value Date    WBC 6.7 02/08/2022    RBC 2.76 02/08/2022    HGB 9.1 02/08/2022    HCT 30.0 02/08/2022    .7 02/08/2022    MCH 33.0 02/08/2022    MCHC 30.3 02/08/2022    RDW 16.0 02/08/2022     02/08/2022    MPV 10.0 02/08/2022     CBC with Differential:    Lab Results   Component Value Date    WBC 6.7 02/08/2022    RBC 2.76 02/08/2022    HGB 9.1 02/08/2022    HCT 30.0 02/08/2022     02/08/2022    .7 02/08/2022    MCH 33.0 02/08/2022    MCHC 30.3 02/08/2022    RDW 16.0 02/08/2022    NRBC 0.9 05/25/2020    SEGSPCT 71 08/16/2013    BANDSPCT 1 03/29/2016    METASPCT 0.9 05/10/2020    LYMPHOPCT 8.3 02/07/2022    PROMYELOPCT 0.9 05/09/2020    MONOPCT 9.0 02/07/2022    MYELOPCT 1.7 05/11/2020    BASOPCT 0.5 02/07/2022    MONOSABS 0.83 02/07/2022    LYMPHSABS 0.76 02/07/2022    EOSABS 0.15 02/07/2022    BASOSABS 0.05 02/07/2022     Hemoglobin/Hematocrit:    Lab Results   Component Value Date    HGB 9.1 02/08/2022    HCT 30.0 02/08/2022     CMP:    Lab Results   Component Value Date     02/08/2022    K 5.9 02/08/2022    K 4.9 02/07/2022     02/08/2022    CO2 28 02/08/2022    BUN 30 02/08/2022    CREATININE 5.3 02/08/2022    GFRAA 10 02/08/2022    LABGLOM 8 02/08/2022    GLUCOSE 108 02/08/2022    GLUCOSE 149 10/12/2011    PROT 6.1 02/08/2022    LABALBU 3.0 02/08/2022    LABALBU 4.1 10/12/2011    CALCIUM 8.6 02/08/2022    BILITOT 0.3 02/08/2022    ALKPHOS 85 02/08/2022    AST 16 02/08/2022    ALT 9 02/08/2022     BMP:    Lab Results   Component Value Date     02/08/2022    K 5.9 02/08/2022    K 4.9 02/07/2022     02/08/2022    CO2 28 02/08/2022    BUN 30 02/08/2022    LABALBU 3.0 02/08/2022    LABALBU 4.1 10/12/2011    CREATININE 5.3 02/08/2022    CALCIUM 8.6 02/08/2022    GFRAA 10 02/08/2022    LABGLOM 8 02/08/2022    GLUCOSE 108 02/08/2022    GLUCOSE 149 10/12/2011     BUN/Creatinine:    Lab Results   Component Value Date    BUN 30 02/08/2022    CREATININE 5.3 02/08/2022 Hepatic Function Panel:    Lab Results   Component Value Date    ALKPHOS 85 02/08/2022    ALT 9 02/08/2022    AST 16 02/08/2022    PROT 6.1 02/08/2022    BILITOT 0.3 02/08/2022    BILIDIR <0.2 01/07/2021    IBILI see below 01/07/2021    LABALBU 3.0 02/08/2022    LABALBU 4.1 10/12/2011     Albumin:    Lab Results   Component Value Date    LABALBU 3.0 02/08/2022    LABALBU 4.1 10/12/2011     Calcium:    Lab Results   Component Value Date    CALCIUM 8.6 02/08/2022     Ionized Calcium:    Lab Results   Component Value Date    IONCA 1.59 06/26/2013     Magnesium:    Lab Results   Component Value Date    MG 2.4 02/08/2022     Phosphorus:    Lab Results   Component Value Date    PHOS 6.1 02/08/2022     LDH:    Lab Results   Component Value Date     05/24/2020     Uric Acid:    Lab Results   Component Value Date    LABURIC 7.7 07/19/2019    URICACID 11.6 10/12/2011     PT/INR:    Lab Results   Component Value Date    PROTIME 10.8 12/05/2021    PROTIME 15.3 06/25/2011    INR 1.0 12/05/2021     PTT:    Lab Results   Component Value Date    APTT 28.3 12/05/2021   [APTT}  Troponin:    Lab Results   Component Value Date    TROPONINI 0.09 10/09/2020     U/A:    Lab Results   Component Value Date    COLORU Yellow 05/03/2021    PROTEINU >=300 05/03/2021    PHUR 7.0 05/03/2021    WBCUA PACKED 05/03/2021    WBCUA 5-10 08/17/2011    RBCUA 0-1 05/03/2021    RBCUA 0-1 06/26/2013    YEAST Present 08/11/2020    BACTERIA MODERATE 05/03/2021    CLARITYU Clear 05/03/2021    SPECGRAV 1.020 05/03/2021    LEUKOCYTESUR LARGE 05/03/2021    UROBILINOGEN 0.2 05/03/2021    BILIRUBINUR Negative 05/03/2021    BILIRUBINUR NEGATIVE 08/17/2011    BLOODU SMALL 05/03/2021    GLUCOSEU Negative 05/03/2021    GLUCOSEU NEGATIVE 08/17/2011    AMORPHOUS FEW 06/25/2011     ABG:    Lab Results   Component Value Date    PH 7.433 05/10/2020    PH 7.377 06/27/2011    PCO2 31.0 05/10/2020    PO2 106.3 05/10/2020    HCO3 20.3 05/10/2020    BE -3.2 05/10/2020 O2SAT 97.5 05/10/2020     HgBA1c:    Lab Results   Component Value Date    LABA1C 6.9 05/18/2020     Microalbumen/Creatinine ratio:  No components found for: RUCREAT  FLP:    Lab Results   Component Value Date    TRIG 181 10/12/2011    HDL 37.0 10/12/2011    LDLCALC 118 10/12/2011     TSH:    Lab Results   Component Value Date    TSH 1.945 08/16/2013     VITAMIN B12: No components found for: B12  FOLATE:    Lab Results   Component Value Date    FOLATE >20.0 05/05/2021     Iron Saturation:  No components found for: PERCENTFE  FERRITIN:    Lab Results   Component Value Date    FERRITIN 767 05/05/2021     AMYLASE:    Lab Results   Component Value Date    AMYLASE 20 07/02/2011     LIPASE:    Lab Results   Component Value Date    LIPASE 14 01/31/2020     Fibrinogen Level:  No components found for: FIB  Urine Toxicology:  No components found for: IAMMENTA, IBARBIT, IBENZO, ICOCAINE, IMARTHC, IOPIATES, IPHENCYC  24 Hour Urine for Protein:  No components found for: RAWUPRO, UHRS3, MYAB60AO, UTV3  24 Hour Urine for Creatinine Clearance:  No components found for: CREAT4, UHRS10, UTV10     Imaging:    EXAMINATION:  ONE XRAY VIEW OF THE CHEST PORTABLE UPRIGHT     2/7/2022 12:20 pm     COMPARISON:  05/06/2021     HISTORY:  ORDERING SYSTEM PROVIDED HISTORY: syncope  TECHNOLOGIST PROVIDED HISTORY:  Reason for exam:->syncope     FINDINGS:  Chronic mild elevation of the right hemidiaphragm.  Cardiomegaly and mitral  annular calcification, unchanged.  No acute pulmonary infiltrate.  No overt  vascular congestion.  No pleural effusion or pneumothorax.  Calcified  mediastinal and hilar lymph nodes, unchanged.  Atherosclerotic thoracic aorta.        Impression  1.  No acute cardiopulmonary disease.  No significant change.     2.  Mild cardiomegaly. Assessment  1-ESKD requiring KRT with IHD on treatment MWF via R IJ TDC  PLAN:  1. Additional IHD today due to the hyperkalemia this AM-used a 2K+ bath  2. Next IHD 2/9/22  3. Follow K+    2-Hyperkalemia  K+5.9  PLAN:  1. IHD today  2. Follow K+    3-Anemia in CKD  HgB below goal of 10-12  PLAN:  1. Start  ARUN as HgB <10  2. Follow H/H    4. Sec HPTH of Renal origin with hyperphosphatemia  Ca++ 8.6, PO4 6.1  PLAN:  1. Correct Ca++ with IHD  2. Follow Ca++ and PO4  3. Continue on the home ferric Citrate as binder    5. Syncopal Event at IHD today with associated bradycardia  PLAN:  1.  Defer to Cardiology    Thank you Dr. Tuyet Miller MD for allowing us to participate in care of Jammie Abelardo Adrian MD  2:40 PM  2/8/2022

## 2024-02-10 ENCOUNTER — APPOINTMENT (OUTPATIENT)
Dept: CT IMAGING | Age: 74
End: 2024-02-10
Payer: MEDICARE

## 2024-02-10 ENCOUNTER — APPOINTMENT (OUTPATIENT)
Dept: GENERAL RADIOLOGY | Age: 74
End: 2024-02-10
Payer: MEDICARE

## 2024-02-10 ENCOUNTER — HOSPITAL ENCOUNTER (INPATIENT)
Age: 74
LOS: 5 days | Discharge: OTHER INSTITUTION | End: 2024-02-15
Attending: STUDENT IN AN ORGANIZED HEALTH CARE EDUCATION/TRAINING PROGRAM | Admitting: STUDENT IN AN ORGANIZED HEALTH CARE EDUCATION/TRAINING PROGRAM
Payer: MEDICARE

## 2024-02-10 DIAGNOSIS — L89.154 PRESSURE INJURY OF SACRAL REGION, STAGE 4 (HCC): ICD-10-CM

## 2024-02-10 DIAGNOSIS — L89.153 PRESSURE INJURY OF SACRAL REGION, STAGE 3 (HCC): ICD-10-CM

## 2024-02-10 DIAGNOSIS — J90 PLEURAL EFFUSION ON LEFT: Primary | ICD-10-CM

## 2024-02-10 DIAGNOSIS — J96.01 ACUTE RESPIRATORY FAILURE WITH HYPOXIA (HCC): ICD-10-CM

## 2024-02-10 LAB
ALBUMIN SERPL-MCNC: 2.3 G/DL (ref 3.5–5.2)
ALP SERPL-CCNC: 103 U/L (ref 35–104)
ALT SERPL-CCNC: 7 U/L (ref 0–32)
ANION GAP SERPL CALCULATED.3IONS-SCNC: 10 MMOL/L (ref 7–16)
APPEARANCE FLD: NORMAL
AST SERPL-CCNC: 14 U/L (ref 0–31)
B.E.: 4.6 MMOL/L (ref -3–3)
BASOPHILS # BLD: 0.07 K/UL (ref 0–0.2)
BASOPHILS NFR BLD: 0 % (ref 0–2)
BILIRUB SERPL-MCNC: 0.3 MG/DL (ref 0–1.2)
BNP SERPL-MCNC: ABNORMAL PG/ML (ref 0–450)
BODY FLD TYPE: NORMAL
BUN SERPL-MCNC: 28 MG/DL (ref 6–23)
CALCIUM SERPL-MCNC: 9 MG/DL (ref 8.6–10.2)
CHLORIDE SERPL-SCNC: 91 MMOL/L (ref 98–107)
CLOT CHECK: NORMAL
CO2 SERPL-SCNC: 33 MMOL/L (ref 22–29)
COHB: 1.1 % (ref 0–1.5)
COLOR FLD: NORMAL
CREAT SERPL-MCNC: 3.5 MG/DL (ref 0.5–1)
CRITICAL: ABNORMAL
DATE ANALYZED: ABNORMAL
DATE OF COLLECTION: ABNORMAL
EOSINOPHIL # BLD: 0.24 K/UL (ref 0.05–0.5)
EOSINOPHILS RELATIVE PERCENT: 1 % (ref 0–6)
ERYTHROCYTE [DISTWIDTH] IN BLOOD BY AUTOMATED COUNT: 15.6 % (ref 11.5–15)
FIO2: 100 %
GFR SERPL CREATININE-BSD FRML MDRD: 13 ML/MIN/1.73M2
GLUCOSE BLD-MCNC: 108 MG/DL (ref 74–99)
GLUCOSE BLD-MCNC: 161 MG/DL (ref 74–99)
GLUCOSE FLD-MCNC: 157 MG/DL
GLUCOSE SERPL-MCNC: 161 MG/DL (ref 74–99)
HCO3: 29.1 MMOL/L (ref 22–26)
HCT VFR BLD AUTO: 34.3 % (ref 34–48)
HGB BLD-MCNC: 10.3 G/DL (ref 11.5–15.5)
HHB: 0.2 % (ref 0–5)
IMM GRANULOCYTES # BLD AUTO: 0.12 K/UL (ref 0–0.58)
IMM GRANULOCYTES NFR BLD: 1 % (ref 0–5)
INFLUENZA A BY PCR: NOT DETECTED
INFLUENZA B BY PCR: NOT DETECTED
INR PPP: 1.1
LAB: ABNORMAL
LDH FLD L TO P-CCNC: 842 U/L
LYMPHOCYTES NFR BLD: 0.76 K/UL (ref 1.5–4)
LYMPHOCYTES RELATIVE PERCENT: 4 % (ref 20–42)
Lab: 220
MAGNESIUM SERPL-MCNC: 2 MG/DL (ref 1.6–2.6)
MCH RBC QN AUTO: 30.8 PG (ref 26–35)
MCHC RBC AUTO-ENTMCNC: 30 G/DL (ref 32–34.5)
MCV RBC AUTO: 102.7 FL (ref 80–99.9)
METHB: 0.3 % (ref 0–1.5)
MODE: ABNORMAL
MONOCYTES NFR BLD: 1.39 K/UL (ref 0.1–0.95)
MONOCYTES NFR BLD: 7 % (ref 2–12)
MONOCYTES NFR FLD: 98 %
NEUTROPHILS NFR BLD: 88 % (ref 43–80)
NEUTROPHILS NFR FLD: 2 %
NEUTS SEG NFR BLD: 18.05 K/UL (ref 1.8–7.3)
O2 CONTENT: 15.5 ML/DL
O2 SATURATION: 99.8 % (ref 92–98.5)
O2HB: 98.4 % (ref 94–97)
OPERATOR ID: 7221
PATIENT TEMP: 37 C
PCO2: 43.5 MMHG (ref 35–45)
PEEP/CPAP: 6 CMH2O
PFO2: 2.17 MMHG/%
PH BLOOD GAS: 7.44 (ref 7.35–7.45)
PIP: 12 CMH2O
PLATELET # BLD AUTO: 314 K/UL (ref 130–450)
PMV BLD AUTO: 10.3 FL (ref 7–12)
PO2: 217.4 MMHG (ref 75–100)
POTASSIUM SERPL-SCNC: 4.7 MMOL/L (ref 3.5–5)
PROCALCITONIN SERPL-MCNC: 1.37 NG/ML (ref 0–0.08)
PROT FLD-MCNC: 3.1 G/DL
PROT SERPL-MCNC: 6.3 G/DL (ref 6.4–8.3)
PROTHROMBIN TIME: 12.2 SEC (ref 9.3–12.4)
RBC # BLD AUTO: 3.34 M/UL (ref 3.5–5.5)
RBC # FLD: NORMAL CELLS/UL
RI(T): 2.08
SARS-COV-2 RDRP RESP QL NAA+PROBE: NOT DETECTED
SODIUM SERPL-SCNC: 134 MMOL/L (ref 132–146)
SOURCE, BLOOD GAS: ABNORMAL
SPECIMEN DESCRIPTION: NORMAL
SPECIMEN TYPE: NORMAL
THB: 10.8 G/DL (ref 11.5–16.5)
TIME ANALYZED: 228
TROPONIN I SERPL HS-MCNC: 100 NG/L (ref 0–9)
TROPONIN I SERPL HS-MCNC: 86 NG/L (ref 0–9)
TSH SERPL DL<=0.05 MIU/L-ACNC: 2.7 UIU/ML (ref 0.27–4.2)
WBC # FLD: 1300 CELLS/UL
WBC OTHER # BLD: 20.6 K/UL (ref 4.5–11.5)

## 2024-02-10 PROCEDURE — 88112 CYTOPATH CELL ENHANCE TECH: CPT

## 2024-02-10 PROCEDURE — 87040 BLOOD CULTURE FOR BACTERIA: CPT

## 2024-02-10 PROCEDURE — 84484 ASSAY OF TROPONIN QUANT: CPT

## 2024-02-10 PROCEDURE — 82945 GLUCOSE OTHER FLUID: CPT

## 2024-02-10 PROCEDURE — 6360000002 HC RX W HCPCS

## 2024-02-10 PROCEDURE — 2060000000 HC ICU INTERMEDIATE R&B

## 2024-02-10 PROCEDURE — 88341 IMHCHEM/IMCYTCHM EA ADD ANTB: CPT

## 2024-02-10 PROCEDURE — 87502 INFLUENZA DNA AMP PROBE: CPT

## 2024-02-10 PROCEDURE — 96365 THER/PROPH/DIAG IV INF INIT: CPT

## 2024-02-10 PROCEDURE — 87205 SMEAR GRAM STAIN: CPT

## 2024-02-10 PROCEDURE — 87077 CULTURE AEROBIC IDENTIFY: CPT

## 2024-02-10 PROCEDURE — 96361 HYDRATE IV INFUSION ADD-ON: CPT

## 2024-02-10 PROCEDURE — 87081 CULTURE SCREEN ONLY: CPT

## 2024-02-10 PROCEDURE — 87635 SARS-COV-2 COVID-19 AMP PRB: CPT

## 2024-02-10 PROCEDURE — B548ZZA ULTRASONOGRAPHY OF SUPERIOR VENA CAVA, GUIDANCE: ICD-10-PCS

## 2024-02-10 PROCEDURE — 83986 ASSAY PH BODY FLUID NOS: CPT

## 2024-02-10 PROCEDURE — 6360000002 HC RX W HCPCS: Performed by: CLINICAL NURSE SPECIALIST

## 2024-02-10 PROCEDURE — 88342 IMHCHEM/IMCYTCHM 1ST ANTB: CPT

## 2024-02-10 PROCEDURE — 82962 GLUCOSE BLOOD TEST: CPT

## 2024-02-10 PROCEDURE — 6360000002 HC RX W HCPCS: Performed by: INTERNAL MEDICINE

## 2024-02-10 PROCEDURE — 6370000000 HC RX 637 (ALT 250 FOR IP): Performed by: STUDENT IN AN ORGANIZED HEALTH CARE EDUCATION/TRAINING PROGRAM

## 2024-02-10 PROCEDURE — 5A09357 ASSISTANCE WITH RESPIRATORY VENTILATION, LESS THAN 24 CONSECUTIVE HOURS, CONTINUOUS POSITIVE AIRWAY PRESSURE: ICD-10-PCS | Performed by: INTERNAL MEDICINE

## 2024-02-10 PROCEDURE — 88305 TISSUE EXAM BY PATHOLOGIST: CPT

## 2024-02-10 PROCEDURE — 84443 ASSAY THYROID STIM HORMONE: CPT

## 2024-02-10 PROCEDURE — 71045 X-RAY EXAM CHEST 1 VIEW: CPT

## 2024-02-10 PROCEDURE — 87070 CULTURE OTHR SPECIMN AEROBIC: CPT

## 2024-02-10 PROCEDURE — 0202U NFCT DS 22 TRGT SARS-COV-2: CPT

## 2024-02-10 PROCEDURE — 87154 CUL TYP ID BLD PTHGN 6+ TRGT: CPT

## 2024-02-10 PROCEDURE — 85610 PROTHROMBIN TIME: CPT

## 2024-02-10 PROCEDURE — 87340 HEPATITIS B SURFACE AG IA: CPT

## 2024-02-10 PROCEDURE — 84157 ASSAY OF PROTEIN OTHER: CPT

## 2024-02-10 PROCEDURE — 89051 BODY FLUID CELL COUNT: CPT

## 2024-02-10 PROCEDURE — P9047 ALBUMIN (HUMAN), 25%, 50ML: HCPCS | Performed by: INTERNAL MEDICINE

## 2024-02-10 PROCEDURE — 96366 THER/PROPH/DIAG IV INF ADDON: CPT

## 2024-02-10 PROCEDURE — 0W9B30Z DRAINAGE OF LEFT PLEURAL CAVITY WITH DRAINAGE DEVICE, PERCUTANEOUS APPROACH: ICD-10-PCS

## 2024-02-10 PROCEDURE — 99285 EMERGENCY DEPT VISIT HI MDM: CPT

## 2024-02-10 PROCEDURE — 2580000003 HC RX 258

## 2024-02-10 PROCEDURE — 2700000000 HC OXYGEN THERAPY PER DAY

## 2024-02-10 PROCEDURE — 94664 DEMO&/EVAL PT USE INHALER: CPT

## 2024-02-10 PROCEDURE — 83735 ASSAY OF MAGNESIUM: CPT

## 2024-02-10 PROCEDURE — 82805 BLOOD GASES W/O2 SATURATION: CPT

## 2024-02-10 PROCEDURE — 80053 COMPREHEN METABOLIC PANEL: CPT

## 2024-02-10 PROCEDURE — 99222 1ST HOSP IP/OBS MODERATE 55: CPT | Performed by: STUDENT IN AN ORGANIZED HEALTH CARE EDUCATION/TRAINING PROGRAM

## 2024-02-10 PROCEDURE — 93005 ELECTROCARDIOGRAM TRACING: CPT | Performed by: STUDENT IN AN ORGANIZED HEALTH CARE EDUCATION/TRAINING PROGRAM

## 2024-02-10 PROCEDURE — 6370000000 HC RX 637 (ALT 250 FOR IP): Performed by: CLINICAL NURSE SPECIALIST

## 2024-02-10 PROCEDURE — 96360 HYDRATION IV INFUSION INIT: CPT

## 2024-02-10 PROCEDURE — 6370000000 HC RX 637 (ALT 250 FOR IP)

## 2024-02-10 PROCEDURE — 31500 INSERT EMERGENCY AIRWAY: CPT

## 2024-02-10 PROCEDURE — 90945 DIALYSIS ONE EVALUATION: CPT

## 2024-02-10 PROCEDURE — 83615 LACTATE (LD) (LDH) ENZYME: CPT

## 2024-02-10 PROCEDURE — 71250 CT THORAX DX C-: CPT

## 2024-02-10 PROCEDURE — 96375 TX/PRO/DX INJ NEW DRUG ADDON: CPT

## 2024-02-10 PROCEDURE — 85025 COMPLETE CBC W/AUTO DIFF WBC: CPT

## 2024-02-10 PROCEDURE — 86317 IMMUNOASSAY INFECTIOUS AGENT: CPT

## 2024-02-10 PROCEDURE — 6360000002 HC RX W HCPCS: Performed by: STUDENT IN AN ORGANIZED HEALTH CARE EDUCATION/TRAINING PROGRAM

## 2024-02-10 PROCEDURE — 94660 CPAP INITIATION&MGMT: CPT

## 2024-02-10 PROCEDURE — 84145 PROCALCITONIN (PCT): CPT

## 2024-02-10 PROCEDURE — 05HY33Z INSERTION OF INFUSION DEVICE INTO UPPER VEIN, PERCUTANEOUS APPROACH: ICD-10-PCS

## 2024-02-10 PROCEDURE — 83880 ASSAY OF NATRIURETIC PEPTIDE: CPT

## 2024-02-10 RX ORDER — OXYCODONE HYDROCHLORIDE 5 MG/1
5 TABLET ORAL EVERY 6 HOURS PRN
Status: DISCONTINUED | OUTPATIENT
Start: 2024-02-10 | End: 2024-02-12

## 2024-02-10 RX ORDER — ALBUMIN (HUMAN) 12.5 G/50ML
25 SOLUTION INTRAVENOUS ONCE
Status: COMPLETED | OUTPATIENT
Start: 2024-02-10 | End: 2024-02-10

## 2024-02-10 RX ORDER — ONDANSETRON 4 MG/1
4 TABLET, FILM COATED ORAL EVERY 6 HOURS PRN
COMMUNITY

## 2024-02-10 RX ORDER — FENTANYL CITRATE 50 UG/ML
50 INJECTION, SOLUTION INTRAMUSCULAR; INTRAVENOUS ONCE
Status: COMPLETED | OUTPATIENT
Start: 2024-02-10 | End: 2024-02-10

## 2024-02-10 RX ORDER — MIDODRINE HYDROCHLORIDE 5 MG/1
10 TABLET ORAL ONCE
Status: COMPLETED | OUTPATIENT
Start: 2024-02-10 | End: 2024-02-10

## 2024-02-10 RX ORDER — CALCIUM CARBONATE 500 MG/1
1 TABLET, CHEWABLE ORAL
Status: DISCONTINUED | OUTPATIENT
Start: 2024-02-10 | End: 2024-02-10

## 2024-02-10 RX ORDER — GUAIFENESIN 400 MG/1
400 TABLET ORAL 3 TIMES DAILY
Status: DISCONTINUED | OUTPATIENT
Start: 2024-02-10 | End: 2024-02-15 | Stop reason: HOSPADM

## 2024-02-10 RX ORDER — SEVELAMER CARBONATE 800 MG/1
1 TABLET, FILM COATED ORAL
Status: ON HOLD | COMMUNITY
End: 2024-02-15 | Stop reason: HOSPADM

## 2024-02-10 RX ORDER — MIDAZOLAM HYDROCHLORIDE 2 MG/2ML
1 INJECTION, SOLUTION INTRAMUSCULAR; INTRAVENOUS ONCE
Status: COMPLETED | OUTPATIENT
Start: 2024-02-10 | End: 2024-02-10

## 2024-02-10 RX ORDER — AMITRIPTYLINE HYDROCHLORIDE 25 MG/1
25 TABLET, FILM COATED ORAL 3 TIMES DAILY
COMMUNITY

## 2024-02-10 RX ORDER — ONDANSETRON 2 MG/ML
4 INJECTION INTRAMUSCULAR; INTRAVENOUS EVERY 6 HOURS PRN
Status: DISCONTINUED | OUTPATIENT
Start: 2024-02-10 | End: 2024-02-10 | Stop reason: ALTCHOICE

## 2024-02-10 RX ORDER — PROCHLORPERAZINE EDISYLATE 5 MG/ML
10 INJECTION INTRAMUSCULAR; INTRAVENOUS EVERY 6 HOURS PRN
Status: DISCONTINUED | OUTPATIENT
Start: 2024-02-10 | End: 2024-02-15 | Stop reason: HOSPADM

## 2024-02-10 RX ORDER — SENNOSIDES A AND B 8.6 MG/1
2 TABLET, FILM COATED ORAL 2 TIMES DAILY
Status: DISCONTINUED | OUTPATIENT
Start: 2024-02-10 | End: 2024-02-15 | Stop reason: HOSPADM

## 2024-02-10 RX ORDER — 0.9 % SODIUM CHLORIDE 0.9 %
500 INTRAVENOUS SOLUTION INTRAVENOUS ONCE
Status: COMPLETED | OUTPATIENT
Start: 2024-02-10 | End: 2024-02-10

## 2024-02-10 RX ORDER — ACETAMINOPHEN 650 MG
TABLET, EXTENDED RELEASE ORAL ONCE
Status: COMPLETED | OUTPATIENT
Start: 2024-02-10 | End: 2024-02-10

## 2024-02-10 RX ORDER — HYDROXYZINE PAMOATE 25 MG/1
25 CAPSULE ORAL 3 TIMES DAILY PRN
COMMUNITY

## 2024-02-10 RX ORDER — INSULIN LISPRO 100 [IU]/ML
0-4 INJECTION, SOLUTION INTRAVENOUS; SUBCUTANEOUS
Status: DISCONTINUED | OUTPATIENT
Start: 2024-02-10 | End: 2024-02-15 | Stop reason: HOSPADM

## 2024-02-10 RX ORDER — INSULIN LISPRO 100 [IU]/ML
0-4 INJECTION, SOLUTION INTRAVENOUS; SUBCUTANEOUS NIGHTLY
Status: DISCONTINUED | OUTPATIENT
Start: 2024-02-10 | End: 2024-02-15 | Stop reason: HOSPADM

## 2024-02-10 RX ORDER — ALBUTEROL SULFATE 2.5 MG/3ML
2.5 SOLUTION RESPIRATORY (INHALATION)
Status: DISCONTINUED | OUTPATIENT
Start: 2024-02-10 | End: 2024-02-15 | Stop reason: HOSPADM

## 2024-02-10 RX ORDER — CAPSAICIN 0.03 G/100G
1 PATCH TOPICAL DAILY PRN
COMMUNITY

## 2024-02-10 RX ORDER — HEPARIN SODIUM 10000 [USP'U]/ML
5000 INJECTION, SOLUTION INTRAVENOUS; SUBCUTANEOUS EVERY 8 HOURS SCHEDULED
Status: DISCONTINUED | OUTPATIENT
Start: 2024-02-10 | End: 2024-02-15 | Stop reason: HOSPADM

## 2024-02-10 RX ORDER — METOPROLOL SUCCINATE 25 MG/1
25 TABLET, EXTENDED RELEASE ORAL DAILY
Status: DISCONTINUED | OUTPATIENT
Start: 2024-02-10 | End: 2024-02-15 | Stop reason: HOSPADM

## 2024-02-10 RX ORDER — POLYETHYLENE GLYCOL 3350 17 G/17G
17 POWDER, FOR SOLUTION ORAL DAILY PRN
Status: DISCONTINUED | OUTPATIENT
Start: 2024-02-10 | End: 2024-02-15 | Stop reason: HOSPADM

## 2024-02-10 RX ORDER — FENTANYL CITRATE 50 UG/ML
75 INJECTION, SOLUTION INTRAMUSCULAR; INTRAVENOUS ONCE
Status: DISCONTINUED | OUTPATIENT
Start: 2024-02-10 | End: 2024-02-10

## 2024-02-10 RX ORDER — ONDANSETRON 2 MG/ML
INJECTION INTRAMUSCULAR; INTRAVENOUS
Status: COMPLETED
Start: 2024-02-10 | End: 2024-02-10

## 2024-02-10 RX ORDER — GUAIFENESIN 600 MG/1
600 TABLET, EXTENDED RELEASE ORAL 2 TIMES DAILY
Status: DISCONTINUED | OUTPATIENT
Start: 2024-02-10 | End: 2024-02-10 | Stop reason: CLARIF

## 2024-02-10 RX ORDER — BISACODYL 10 MG
10 SUPPOSITORY, RECTAL RECTAL DAILY PRN
Status: DISCONTINUED | OUTPATIENT
Start: 2024-02-10 | End: 2024-02-15 | Stop reason: HOSPADM

## 2024-02-10 RX ORDER — OXYCODONE HYDROCHLORIDE 5 MG/1
5 CAPSULE ORAL EVERY 6 HOURS PRN
Status: ON HOLD | COMMUNITY
End: 2024-02-15 | Stop reason: HOSPADM

## 2024-02-10 RX ORDER — MIDODRINE HYDROCHLORIDE 5 MG/1
15 TABLET ORAL
Status: DISCONTINUED | OUTPATIENT
Start: 2024-02-12 | End: 2024-02-15 | Stop reason: HOSPADM

## 2024-02-10 RX ORDER — ACETAMINOPHEN 500 MG
1000 TABLET ORAL EVERY 8 HOURS PRN
Status: DISCONTINUED | OUTPATIENT
Start: 2024-02-10 | End: 2024-02-15 | Stop reason: HOSPADM

## 2024-02-10 RX ORDER — DEXTROMETHORPHAN HYDROBROMIDE AND QUINIDINE SULFATE 20; 10 MG/1; MG/1
1 CAPSULE, GELATIN COATED ORAL 2 TIMES DAILY
COMMUNITY

## 2024-02-10 RX ADMIN — OXYCODONE 5 MG: 5 TABLET ORAL at 18:24

## 2024-02-10 RX ADMIN — ALBUTEROL SULFATE 2.5 MG: 2.5 SOLUTION RESPIRATORY (INHALATION) at 20:46

## 2024-02-10 RX ADMIN — ACETAMINOPHEN 1000 MG: 500 TABLET ORAL at 17:07

## 2024-02-10 RX ADMIN — PETROLATUM: 420 OINTMENT TOPICAL at 20:22

## 2024-02-10 RX ADMIN — GUAIFENESIN 400 MG: 400 TABLET ORAL at 20:25

## 2024-02-10 RX ADMIN — Medication: at 09:30

## 2024-02-10 RX ADMIN — HEPARIN SODIUM 5000 UNITS: 10000 INJECTION INTRAVENOUS; SUBCUTANEOUS at 22:19

## 2024-02-10 RX ADMIN — VANCOMYCIN HYDROCHLORIDE 2250 MG: 1 INJECTION, POWDER, LYOPHILIZED, FOR SOLUTION INTRAVENOUS at 07:53

## 2024-02-10 RX ADMIN — ALBUMIN (HUMAN) 25 G: 0.25 INJECTION, SOLUTION INTRAVENOUS at 14:01

## 2024-02-10 RX ADMIN — CEFEPIME 2000 MG: 2 INJECTION, POWDER, FOR SOLUTION INTRAVENOUS at 06:49

## 2024-02-10 RX ADMIN — SENNOSIDES 17.2 MG: 8.6 TABLET, COATED ORAL at 20:25

## 2024-02-10 RX ADMIN — ONDANSETRON 4 MG: 2 INJECTION INTRAMUSCULAR; INTRAVENOUS at 10:21

## 2024-02-10 RX ADMIN — MIDODRINE HYDROCHLORIDE 10 MG: 5 TABLET ORAL at 04:23

## 2024-02-10 RX ADMIN — FENTANYL CITRATE 50 MCG: 50 INJECTION, SOLUTION INTRAMUSCULAR; INTRAVENOUS at 09:35

## 2024-02-10 RX ADMIN — SODIUM CHLORIDE 500 ML: 9 INJECTION, SOLUTION INTRAVENOUS at 05:28

## 2024-02-10 RX ADMIN — MIDAZOLAM HYDROCHLORIDE 1 MG: 1 INJECTION, SOLUTION INTRAMUSCULAR; INTRAVENOUS at 09:34

## 2024-02-10 ASSESSMENT — PAIN SCALES - GENERAL
PAINLEVEL_OUTOF10: 9
PAINLEVEL_OUTOF10: 10

## 2024-02-10 ASSESSMENT — PAIN DESCRIPTION - LOCATION
LOCATION: COCCYX
LOCATION: BACK;COCCYX

## 2024-02-10 ASSESSMENT — PAIN DESCRIPTION - DESCRIPTORS
DESCRIPTORS: ACHING
DESCRIPTORS: SORE;STABBING

## 2024-02-10 NOTE — H&P
[facility] Hospitalist Group History and Physical    CHIEF COMPLAINT:  SOB     History of Present Illness:      This is a 73 year old female with past medical history of anemia in CKD, anxiety and depression, chronic pain, type 2 DM, ESRD on HD MTWThF via left upper arm access, GERD, HTN, morbid obesity who presents to ER from [facility] with respiratory distress with SOB and chest pain earlier. She is on room air at baseline and per EMS was hypoxic 70% on room air. They placed patient on 5 L via NC which increased saturation to high 80s so they put her on cpap. Patient Is tachycardic and hypotensive on arrival. Labs reveal Cl 91, CO2 33, Creatinine 3.5, GFR 13, Glucose 161, BNP 36544, Troponin 100, WBC 20.6, Hgb 10.3. COVID and flu negative. ABG show pO2 217.4, HCO3 29.1. CXR with near complete left lung white out with mild right upward mediastinal shift. CT chest showing large left pleural effusion with near complete left lung atelectasis. Patchy ground-glass opacities in right lung. Pulmonary consulted and will place left pigtail catheter today. Patient was given Cefepime, Vancomycin, Midodrine, 500 mL bolus. Decision to admit.     Informant(s) for H&P: patient     REVIEW OF SYSTEMS:  A comprehensive review of systems was negative except for: what is in the HPI    PMH:  Past Medical History:   Diagnosis Date    Anemia in chronic kidney disease     Anxiety and depression     Arthritis     Chronic pain syndrome     COVID-19 05/2020    COVID-19     Decreased dorsalis pedis pulse 10/25/2022    Diabetes mellitus (HCC)     Dysphagia, oral phase     ESRD on hemodialysis (HCC) 10/25/2022    GERD (gastroesophageal reflux disease)     Hemodialysis patient ([facility])     M-W-F    Hyperkalemia     Hypertension     Hypertensive kidney disease with stage 4 chronic kidney disease (HCC)     Insomnia     Kidney stones     MDD (major depressive disorder)     Moderate protein-calorie malnutrition (HCC)     Morbid obesity (HCC)   bipap and appears comfortable. After procedure will work on weaning oxygen to baseline room air. Procalcitonin pending. NPO for now, can continue diet when able to wean off bipap.   Sepsis secondary to sacral and breast wounds - consult to surgery. Has refused debridement in the past. Presents with hypoxia, leukocytosis, tachycardia, hypotension. Continue antibiotics pending surgical evaluation. Continue local wound care.   Leukocytosis secondary to above - WBC 20.6, monitor labs and cultures. Continue antibiotics.   ESRD on HD - MTWThF at [facility]. Left upper arm AV fistula + bruit/thrill. Nephrology consulted for HD recommendations.   HFpEF - EF 60% on echo 2022. BNP 20239. Diuretics vs Dialysis today for fluid overload. Monitor intake and output.   Chronic hypotension - Given midodrine and 500 mL bolus in ER. Careful attention to fluids given CHF and ESRD. Continue midodrine. Monitor BP and adjust accordingly.   Acute metabolic alkalosis - HCO2 29.1, CO2 33. nephrology following to adjust dialysis.   Anemia of CKD - Hgb stable 10.3. Continue ARUN per nephrology to maintain target Hgb 10/12. Monitor and transfuse for Hgb < 7.   Calciphylaxis with multiple wounds - has refused debridement and biopsy in the past. DC TUMS as per last admission. Continue local wound care. Surgery consulted.   Secondary hyperparathyroidism secondary to ESRD - Phosphorus pending. Supplement calcium and phosphorus as needed. Monitor labs.   Type 2 DM - continue ssi.   Anxiety and depression  Elevated troponin in the setting of ESRD - Troponin 100. No chest pain. Repeat troponin and EKG if concern for chest pain.       Code Status: DNRCCA   DVT prophylaxis: Heparin     45 minutes time spent reviewing patient chart, assessing patient, discussing plan of care with patient and family, discussing plan of care with collaborating physician, and charting.     Electronically signed by PABLO De Paz NP on 2/10/2024 at 8:01 AM

## 2024-02-10 NOTE — ED PROVIDER NOTES
Assumed care of patient from Dr. Garvin. Please refer to Dr. Garvin's note for full history and physical.  Presented with hypoxia and resp distress.   Workup thus far revealed complete opacification and whiteout of left lung.  Pending at this time is conversation with family regarding chest tube placement.    Course under my care:  ED Course as of 02/10/24 1016   Sat Feb 10, 2024   0244 ATTENDING PROVIDER ATTESTATION:     I have personally performed and/or participated in the history, exam, medical decision making, and procedures and agree with all pertinent clinical information unless otherwise noted.      I have also reviewed and agree with the past medical, family and social history unless otherwise noted.  I personally reviewed any ECG performed and agree with the resident unless stated below.      I have discussed this patient in detail with the resident, and provided the instruction and education regarding the patient.  My findings/plan: Patient seen and evaluated, briefly this is a 73-year-old female coming from nursing facility in respiratory distress.  Notes that she started feel short of breath about 1 hour prior to arrival.  On chart review the patient has history of ESRD on HD, states she was Monday, Tuesday, Thursday, Friday, follows with Dr. Dillard.  States she went for full treatment yesterday.  She has history of hypertension, diabetes.  On physical examination, she has wheezing, sounds rhonchorous throughout.  Plan for labs, imaging, supportive care       [BB]   0668 Venous Access Procedure Note  Indication: nursing staff unable to obtain access and MD skill needed    Procedure: The patient was placed in the appropriate position and the skin over the puncture site was prepped with betadine and draped in a sterile fashion. Intravenous access was obtained in a right forearm vein and the site was secured appropriately.  Procedure was preformed under live ultrasound for guidance.    The patient  tolerated the procedure well.    Complications: None [AH]   0748 Discussed with pulmonary and patient should probably have a chest tube placed sometime in the near future.  On talking with the patient, she is unable to consent at this time.  She is unable to fully understand risks and benefits of the procedure and keeps stating \"I do not know\".  I did attempt to reach the patient's daughter who I spoke with earlier in the night to obtain consent and was unable to get in contact with her.  Procedure to be deferred to pulmonary at this time. [TP]   6120 I spoke to the patient's children's including her daughter Brandie, and her other daughter and the POA, Inga, they are both agreeable with the chest tube.  I discussed the risk and benefits with them and they are agreeable.  Nicky COLLADO also spoke to the POA and confirmed her agreement [AH]   1011 PROCEDURE  2/10/24       Time: 9:45 AM    CHEST TUBE INSERTION  Risks, benefits and alternatives (for applicable procedures below) described.   Performed By: Abhinav Malin MD and EM Attending Physician.    Indication:  effusion.   Informed consent: Verbal consent obtained.  The patient was, legal guardian was, and family members were counseled regarding the procedure in person, it's indications, risks, potential complications and alternatives and any questions were answered. Verbal consent was obtained.  Procedure: The left side was prepped with betadine and draped in a sterile fashion.  Local anesthesia  obtained by infiltration using 1% Lidocaine without epinephrine. A eric pigtail catheter was inserted at the 4th intercostal space laterally in the midaxillary line..    Chest tube output:  approx. 1,700 cc of serosanguinous fluid within 15 minutes.  Post Procedure Xray: which showed good tube position.  Procedural Complications:  None.  Patient tolerated the procedure {well. [AH]   1013 Chest tube output 15 minutes approximately 1,700 cc [AH]   1015 Left lung space

## 2024-02-10 NOTE — ED NOTES
Chest tube inserted by Dr. Malin at 0950 to left chest. In room was Dr. Malin, [place], RN and an RN student. Pt given midazolam 1mg and Fentanyl 50mcg at 0940. Pt tolerated procedure well, spo2 remained at 100% entire procedure. Immediate return of 1750ml of cranberry colored cloudy fluid from chest tube.

## 2024-02-10 NOTE — PROGRESS NOTES
Date: 2/10/2024    Time: 9:16 AM    Patient Placed On BIPAP/CPAP/ Non-Invasive Ventilation?  No, pt remains on bipap    If no must comment.  Facial area red/color change? No           If YES are Blister/Lesion present?No   If yes must notify nursing staff  BIPAP/CPAP skin barrier?  Yes    Skin barrier type:mepilexlite       Comments:        Sweetie Goss RCP

## 2024-02-10 NOTE — ED PROVIDER NOTES
Department of Emergency Medicine     Written by: Yuniel Garvin DO  Patient Name: Marisela Mcguire  Admit Date: 2/10/2024  2:16 AM  MRN: [id number]                   : 1950      HPI  Chief Complaint   Patient presents with    Shortness of Breath     SOB, hypoxic on roomair (baseline)     This is a 73-year-old female with past medical history of end-stage renal disease requiring dialysis, diabetes, hypertension, anemia who presents to the emergency department today in respiratory distress.  Patient states today she has been very short of breath and had some chest pain earlier but does not currently have any right now.  She does not wear any oxygen at baseline and per EMS, she was hypoxic in the 70% on room air.  Then placed on 5 L nasal cannula was increased hydration to the high 80%'s.  They then made decision to place the patient on CPAP.  Patient denies any lightheadedness or dizziness, fever or chills, nausea or vomiting, chest pain, abdominal pain, hematuria or dysuria, constipation or diarrhea.    Nursing notes were all reviewed and agreed with or any disagreements were addressed in the HPI.    Review of systems:    Pertinent positives and negatives mentioned in the HPI/MDM.     Physical Exam  Constitutional:       General: She is not in acute distress.     Appearance: She is obese. She is ill-appearing.   HENT:      Head: Normocephalic and atraumatic.   Eyes:      Extraocular Movements: Extraocular movements intact.      Pupils: Pupils are equal, round, and reactive to light.   Cardiovascular:      Rate and Rhythm: Tachycardia present. Rhythm irregular.   Pulmonary:      Effort: Respiratory distress present.      Breath sounds: No stridor. Decreased breath sounds present. No rales.   Abdominal:      General: Abdomen is flat. There is no distension.      Palpations: Abdomen is soft.      Tenderness: There is no guarding.   Musculoskeletal:         General: No deformity. Normal range of motion.       Cervical back: Normal range of motion.      Right lower leg: Edema present.      Left lower leg: Edema present.      Comments: 1+ edema bilaterally.   Skin:     Capillary Refill: Capillary refill takes less than 2 seconds.      Coloration: Skin is not jaundiced.      Findings: No rash.   Neurological:      General: No focal deficit present.      Mental Status: She is oriented to person, place, and time.   Psychiatric:         Mood and Affect: Mood normal.         Behavior: Behavior normal.           ------------------------- PAST HISTORY -------------------------    I personally reviewed the following history:    Past Medical History:  has a past medical history of Anemia in chronic kidney disease, Anxiety and depression, Arthritis, Chronic pain syndrome, COVID-19, COVID-19, Decreased dorsalis pedis pulse, Diabetes mellitus (HCC), Dysphagia, oral phase, ESRD on hemodialysis (HCC), GERD (gastroesophageal reflux disease), Hemodialysis patient (HCC), Hyperkalemia, Hypertension, Hypertensive kidney disease with stage 4 chronic kidney disease (HCC), Insomnia, Kidney stones, MDD (major depressive disorder), Moderate protein-calorie malnutrition (HCC), Morbid obesity (HCC), Muscle weakness (generalized), Obesity, Pressure injury of sacral region, stage 3 (HCC), Pressure ulcer of sacral region, Sacral pressure ulcer, Unspecified osteoarthritis, unspecified site, and Weakness generalized.    Past Surgical History:  has a past surgical history that includes Foot surgery ( ); Cystocopy ( );  section (x3); Upper gastrointestinal endoscopy (2.18.15); Cholecystectomy (3/31/16); Abdomen surgery (N/A, 2020); Upper gastrointestinal endoscopy (N/A, 2020); Upper gastrointestinal endoscopy (N/A, 2020); vascular surgery (N/A, 2021); Dialysis fistula creation (Left, 2021); Dialysis fistula creation (Right, 2021); Femur fracture surgery (Right, 2022); and Wrist fracture surgery (Left,

## 2024-02-10 NOTE — PROGRESS NOTES
Pharmacy Consultation Note  (Antibiotic Dosing and Monitoring)    Initial consult date: 2/10  Consulting physician/provider: Bennie  Drug: Vancomycin  Indication: Sepsiis    Age/  Gender Height Weight IBW  Allergy Information   73 y.o./female 152.4 cm (5') 75.8 kg (167 lb 1.7 oz)     Ideal body weight: 45.5 kg (100 lb 4.9 oz)  Adjusted ideal body weight: 63.2 kg (139 lb 6 oz)   Patient has no known allergies.      Renal Function:  Recent Labs     02/10/24  0227   BUN 28*   CREATININE 3.5*       Intake/Output Summary (Last 24 hours) at 2/10/2024 1610  Last data filed at 2/10/2024 1540  Gross per 24 hour   Intake 2250 ml   Output 1900 ml   Net 350 ml       Vancomycin Monitoring:  Trough:  No results for input(s): \"VANCOTROUGH\" in the last 72 hours.  Random:  No results for input(s): \"VANCORANDOM\" in the last 72 hours.    Vancomycin Administration Times:  Recent vancomycin administrations                     vancomycin (VANCOCIN) 2,250 mg in sodium chloride 0.9 % 500 mL IVPB (mg) 2,250 mg New Bag 02/10/24 4178                    Assessment:  Patient is a 73 y.o. female who has been initiated on vancomycin  Estimated Creatinine Clearance: 14 mL/min (A) (based on SCr of 3.5 mg/dL (H)).  To dose vancomycin, pharmacy will be utilizing dosing based off of levels due to patient requiring hemodialysis    Plan:  Will continue vancomycin dosed by level and dialysis schedule  Will check vancomycin levels when appropriate  Will continue to monitor renal function   Pharmacy to follow      Donna Uriostegui RPH 2/10/2024 4:10 PM    NADIA: [phone]  SEY: [phone]  SJW: [phone]

## 2024-02-10 NOTE — FLOWSHEET NOTE
02/10/24 1540   Vital Signs   /73   Temp 97 °F (36.1 °C)   Pulse 78   Respirations 18   Weight - Scale 89.8 kg (197 lb 15.6 oz)   Weight Method Bed scale   Percent Weight Change -1.75   Post-Hemodialysis Assessment   Post-Treatment Procedures Blood returned;Access bleeding time < 10 minutes   Machine Disinfection Process Acid/Vinegar Clean;Bleach;Machine Absence of Bleach Machine;Exterior Machine Disinfection   Rinseback Volume (ml) 300 ml   Dialyzer Clearance Lightly streaked   Duration of Treatment (minutes) 105 minutes   Heparin Amount Administered During Treatment (mL) 0 mL   Hemodialysis Intake (ml) 300 ml   Hemodialysis Output (ml) 1900 ml   NET Removed (ml) 1600   Tolerated Treatment Fair   Interventions Taken   (tx ended w/ 15min remaining)   Patient Response to Treatment tolerated tx fair, pt complained of 9 out of 10 chest pain per Dr. Gaitan tx ended w/ 15min remaining, after pt rinsed back pt said it is a dull chronic pain she has intermittenly and closed eyes to rest. 1.9L fluid removed, 25G albumin given w/ tx for fluid removal per orders.   Bilateral Breath Sounds Diminished   Edema Generalized   Physician Notified Yes   Time Off 1530   Patient Disposition Return to room   Observations & Evaluations   Level of Consciousness 0   Heart Rhythm Regular   Respiratory Quality/Effort Unlabored   O2 Device Nasal cannula   Skin Condition/Temp Dry;Warm     Tolerated tx fair, pt complained of 9 out of 10 chest pain per Dr. Gaitan tx ended w/ 15min remaining of 2hr UF, after pt rinsed back pt said it is a dull chronic pain she has intermittenly and closed eyes to rest. 1.9L fluid removed, 25G albumin given w/ tx for fluid removal per orders. Needles removed and pressure held until hemostasis achieved and dressing applied. Pt alert and vitals stable, report to RN, patient remains in care of staff.

## 2024-02-10 NOTE — CONSULTS
Nephrology progress note  2/10/2024 11:26 AM  Subjective:     Admit Date: 2/10/2024  PCP: Rj Casillas MD    Interval History: I was paged via [employer] this morning at 9:09 AM for nephrology consultation    Formal nephrology consultation deferred.  Patient is known to the kidney group.  She has end-stage kidney disease on hemodialysis via a patent left upper arm access.    Patient was admitted to the hospital from her extended care facility with shortness of breath.  Found to have large left pleural effusion which improved left chest tube placement    I was asked to see the patient regarding end-stage kidney disease and need for hemodialysis    When I saw the patient she was lying in bed in the emergency department wearing BiPAP mask.  She told me that she aches all over.  She told me her shortness of breath is completely resolved.  No nausea or vomiting.  No chest pressure.  No headache or lightheadedness.    Diet: Diet NPO    Past Medical History:   Diagnosis Date    Anemia in chronic kidney disease     Anxiety and depression     Arthritis     Chronic pain syndrome     COVID-19 05/2020    COVID-19     Decreased dorsalis pedis pulse 10/25/2022    Diabetes mellitus (HCC)     Dysphagia, oral phase     ESRD on hemodialysis ([facility]) 10/25/2022    GERD (gastroesophageal reflux disease)     Hemodialysis patient ([facility])     M-W-F    Hyperkalemia     Hypertension     Hypertensive kidney disease with stage 4 chronic kidney disease (HCC)     Insomnia     Kidney stones     MDD (major depressive disorder)     Moderate protein-calorie malnutrition (HCC)     Morbid obesity (HCC)     Muscle weakness (generalized)     Obesity     Pressure injury of sacral region, stage 3 (HCC) 10/25/2022    Pressure ulcer of sacral region     Sacral pressure ulcer 08/03/2021    stage 4    Unspecified osteoarthritis, unspecified site     Weakness generalized      Family History   Problem Relation Age of Onset    Cancer Sister      Social History

## 2024-02-10 NOTE — CONSULTS
Pulmonary Consultation    Admit Date: 2/10/2024  Requesting Physician: Rj Casillas MD    CC:  shortness of breath     HPI:  73 YOF, from [facility] presents to [facility] ER 2/9 after having shortness of breath and hypoxia at the nursing facility. POX as low as 70% on room air. She has no known lung disease not on inhalers or o2. Has ESRD on HD. A sacral and breast wound refusing debridement. Daughter at bedside states she is confused at baseline last few months ICU admission imitated but improved   Cxr with large left sided pleural effusion , Ct inserted at bedside and 1700cc bloody fluid removed.  Originally requiring bipap to maintain POX now down to 5L. POX 94%. She denies elías dyspnea, has a wet non-productive cough that hurts her chest and left arm. She is pretty immobile at baseline .     Sitting up in bed on 5L. Pleasantly confused and in no distress       PMH:    Past Medical History:   Diagnosis Date    Anemia in chronic kidney disease     Anxiety and depression     Arthritis     Chronic pain syndrome     COVID-19 05/2020    COVID-19     Decreased dorsalis pedis pulse 10/25/2022    Diabetes mellitus ([facility])     Dysphagia, oral phase     ESRD on hemodialysis ([facility]) 10/25/2022    GERD (gastroesophageal reflux disease)     Hemodialysis patient ([facility])     M-W-F    Hyperkalemia     Hypertension     Hypertensive kidney disease with stage 4 chronic kidney disease ([facility])     Insomnia     Kidney stones     MDD (major depressive disorder)     Moderate protein-calorie malnutrition (HCC)     Morbid obesity ([facility])     Muscle weakness (generalized)     Obesity     Pressure injury of sacral region, stage 3 (HCC) 10/25/2022    Pressure ulcer of sacral region     Sacral pressure ulcer 08/03/2021    stage 4    Unspecified osteoarthritis, unspecified site     Weakness generalized      PSH:    Past Surgical History:   Procedure Laterality Date    ABDOMEN SURGERY N/A 5/4/2020    SACRAL WOUND DEBRIDEMENT CALL  WITH TIME AVAIL AM      1. Large left pleural effusion with near complete left lung atelectasis. 2. Patchy ground-glass opacities right lung may be infectious or inflammatory in etiology.                 XR CHEST PORTABLE    Result Date: 2/10/2024  EXAMINATION: ONE XRAY VIEW OF THE CHEST 2/10/2024 2:17 am COMPARISON: 01/14/2024 HISTORY: ORDERING SYSTEM PROVIDED HISTORY: shortness of breath TECHNOLOGIST PROVIDED HISTORY: Reason for exam:->shortness of breath FINDINGS: There is near complete left lung white out.  The left heart border is obscured.  Mild rightward mediastinal shift, although this may be accentuated due to patient positioning.  The right lung is clear.  No pneumothorax.     Near complete left lung white out with mild rightward mediastinal shift. This may be due to a combination of pleural effusion, atelectasis, and/or pneumonia.           73 YOF, from NH, confused at baseline, difficulty breathing with hypoxia brought to ER  POX 70% on RA required bipap 12/6  Left white out on CXR   Left CT inserted 1700c fluid removed, cxr improved  Now on 5L    Assessment/plan:  Left pleural effusion  CT inserted in ER  1700c fluid removed and sent-pending    Hypoxia   70% on RA , 80% on 5L, bipap applied 12/6, post tap down to 5L  Continue to wean o2 for POX >90%  Baseline is room air   Pneumonia   Right side infiltrate on imaging   Get a procal  Resp cx  Strep and legionella antigens,   Sputum cx if able  Add mucinex, and albuterol with flutter   Resp cx if able   Suspect Underlying COPD, PFT as outpatient   Started on cefepime   ESRD on HD  Known to renal   Breast wound   Foul drainage   Defer to primary   Confusion   At baseline       Thank you for allowing us to see this patient in consultation.  Please contact us with any questions.      Electronically signed by PABLO Del Valle on 2/10/2024 at 1:14 PM    Seen and evaluated, agree with above assessment and plan  Patient with a large left-sided pleural effusion and hypoxemia,

## 2024-02-10 NOTE — PROGRESS NOTES
Pt arrived to unit from dialysis, pt continues to yell out \"help me\" refusing physical assessment, pt has several wounds including large deep coccyx wound, refusing any assessment of wounds, pt has left chest tube, intact, connected to suction, pt placed on low air loss bed, continued to scream at staff and keeps calling out

## 2024-02-11 LAB
ALBUMIN SERPL-MCNC: 2.3 G/DL (ref 3.5–5.2)
ALP SERPL-CCNC: 89 U/L (ref 35–104)
ALT SERPL-CCNC: 7 U/L (ref 0–32)
ANION GAP SERPL CALCULATED.3IONS-SCNC: 11 MMOL/L (ref 7–16)
AST SERPL-CCNC: 13 U/L (ref 0–31)
B PARAP IS1001 DNA NPH QL NAA+NON-PROBE: NOT DETECTED
B PERT DNA SPEC QL NAA+PROBE: NOT DETECTED
BASOPHILS # BLD: 0 K/UL (ref 0–0.2)
BASOPHILS NFR BLD: 0 % (ref 0–2)
BILIRUB SERPL-MCNC: 0.3 MG/DL (ref 0–1.2)
BUN SERPL-MCNC: 33 MG/DL (ref 6–23)
C PNEUM DNA NPH QL NAA+NON-PROBE: NOT DETECTED
CALCIUM SERPL-MCNC: 8.7 MG/DL (ref 8.6–10.2)
CHLORIDE SERPL-SCNC: 97 MMOL/L (ref 98–107)
CO2 SERPL-SCNC: 30 MMOL/L (ref 22–29)
CREAT SERPL-MCNC: 4.6 MG/DL (ref 0.5–1)
EOSINOPHIL # BLD: 0.15 K/UL (ref 0.05–0.5)
EOSINOPHILS RELATIVE PERCENT: 2 % (ref 0–6)
ERYTHROCYTE [DISTWIDTH] IN BLOOD BY AUTOMATED COUNT: 15.5 % (ref 11.5–15)
FLUAV RNA NPH QL NAA+NON-PROBE: NOT DETECTED
FLUBV RNA NPH QL NAA+NON-PROBE: NOT DETECTED
GFR SERPL CREATININE-BSD FRML MDRD: 10 ML/MIN/1.73M2
GLUCOSE BLD-MCNC: 188 MG/DL (ref 74–99)
GLUCOSE BLD-MCNC: 217 MG/DL (ref 74–99)
GLUCOSE BLD-MCNC: 50 MG/DL (ref 74–99)
GLUCOSE BLD-MCNC: 71 MG/DL (ref 74–99)
GLUCOSE SERPL-MCNC: 150 MG/DL (ref 74–99)
HADV DNA NPH QL NAA+NON-PROBE: NOT DETECTED
HCOV 229E RNA NPH QL NAA+NON-PROBE: NOT DETECTED
HCOV HKU1 RNA NPH QL NAA+NON-PROBE: NOT DETECTED
HCOV NL63 RNA NPH QL NAA+NON-PROBE: NOT DETECTED
HCOV OC43 RNA NPH QL NAA+NON-PROBE: NOT DETECTED
HCT VFR BLD AUTO: 32.5 % (ref 34–48)
HGB BLD-MCNC: 9.8 G/DL (ref 11.5–15.5)
HMPV RNA NPH QL NAA+NON-PROBE: NOT DETECTED
HPIV1 RNA NPH QL NAA+NON-PROBE: NOT DETECTED
HPIV2 RNA NPH QL NAA+NON-PROBE: NOT DETECTED
HPIV3 RNA NPH QL NAA+NON-PROBE: NOT DETECTED
HPIV4 RNA NPH QL NAA+NON-PROBE: NOT DETECTED
LDH SERPL-CCNC: 188 U/L (ref 135–214)
LYMPHOCYTES NFR BLD: 0.07 K/UL (ref 1.5–4)
LYMPHOCYTES RELATIVE PERCENT: 1 % (ref 20–42)
M PNEUMO DNA NPH QL NAA+NON-PROBE: NOT DETECTED
MAGNESIUM SERPL-MCNC: 1.9 MG/DL (ref 1.6–2.6)
MCH RBC QN AUTO: 30.4 PG (ref 26–35)
MCHC RBC AUTO-ENTMCNC: 30.2 G/DL (ref 32–34.5)
MCV RBC AUTO: 100.9 FL (ref 80–99.9)
MONOCYTES NFR BLD: 0.22 K/UL (ref 0.1–0.95)
MONOCYTES NFR BLD: 3 % (ref 2–12)
NEUTROPHILS NFR BLD: 95 % (ref 43–80)
NEUTS SEG NFR BLD: 8.06 K/UL (ref 1.8–7.3)
PHOSPHATE SERPL-MCNC: 5.7 MG/DL (ref 2.5–4.5)
PLATELET # BLD AUTO: 204 K/UL (ref 130–450)
PMV BLD AUTO: 10.4 FL (ref 7–12)
POTASSIUM SERPL-SCNC: 4.2 MMOL/L (ref 3.5–5)
PROCALCITONIN SERPL-MCNC: 2.1 NG/ML (ref 0–0.08)
PROT SERPL-MCNC: 5.7 G/DL (ref 6.4–8.3)
RBC # BLD AUTO: 3.22 M/UL (ref 3.5–5.5)
RBC # BLD: ABNORMAL 10*6/UL
RSV RNA NPH QL NAA+NON-PROBE: NOT DETECTED
RV+EV RNA NPH QL NAA+NON-PROBE: NOT DETECTED
SARS-COV-2 RNA NPH QL NAA+NON-PROBE: NOT DETECTED
SODIUM SERPL-SCNC: 138 MMOL/L (ref 132–146)
SPECIMEN DESCRIPTION: NORMAL
VANCOMYCIN SERPL-MCNC: 24.7 UG/ML (ref 5–40)
WBC OTHER # BLD: 8.5 K/UL (ref 4.5–11.5)

## 2024-02-11 PROCEDURE — 2060000000 HC ICU INTERMEDIATE R&B

## 2024-02-11 PROCEDURE — 94660 CPAP INITIATION&MGMT: CPT

## 2024-02-11 PROCEDURE — 94669 MECHANICAL CHEST WALL OSCILL: CPT

## 2024-02-11 PROCEDURE — 2580000003 HC RX 258: Performed by: STUDENT IN AN ORGANIZED HEALTH CARE EDUCATION/TRAINING PROGRAM

## 2024-02-11 PROCEDURE — 2500000003 HC RX 250 WO HCPCS: Performed by: INTERNAL MEDICINE

## 2024-02-11 PROCEDURE — 84100 ASSAY OF PHOSPHORUS: CPT

## 2024-02-11 PROCEDURE — 85025 COMPLETE CBC W/AUTO DIFF WBC: CPT

## 2024-02-11 PROCEDURE — 6370000000 HC RX 637 (ALT 250 FOR IP): Performed by: STUDENT IN AN ORGANIZED HEALTH CARE EDUCATION/TRAINING PROGRAM

## 2024-02-11 PROCEDURE — 99232 SBSQ HOSP IP/OBS MODERATE 35: CPT | Performed by: STUDENT IN AN ORGANIZED HEALTH CARE EDUCATION/TRAINING PROGRAM

## 2024-02-11 PROCEDURE — 80053 COMPREHEN METABOLIC PANEL: CPT

## 2024-02-11 PROCEDURE — 82962 GLUCOSE BLOOD TEST: CPT

## 2024-02-11 PROCEDURE — 6370000000 HC RX 637 (ALT 250 FOR IP): Performed by: CLINICAL NURSE SPECIALIST

## 2024-02-11 PROCEDURE — 6360000002 HC RX W HCPCS: Performed by: CLINICAL NURSE SPECIALIST

## 2024-02-11 PROCEDURE — 6360000002 HC RX W HCPCS: Performed by: STUDENT IN AN ORGANIZED HEALTH CARE EDUCATION/TRAINING PROGRAM

## 2024-02-11 PROCEDURE — 83735 ASSAY OF MAGNESIUM: CPT

## 2024-02-11 PROCEDURE — 94640 AIRWAY INHALATION TREATMENT: CPT

## 2024-02-11 PROCEDURE — 5A1D70Z PERFORMANCE OF URINARY FILTRATION, INTERMITTENT, LESS THAN 6 HOURS PER DAY: ICD-10-PCS | Performed by: INTERNAL MEDICINE

## 2024-02-11 PROCEDURE — 80202 ASSAY OF VANCOMYCIN: CPT

## 2024-02-11 PROCEDURE — 2700000000 HC OXYGEN THERAPY PER DAY

## 2024-02-11 RX ORDER — CALCIUM ACETATE 667 MG/1
1 CAPSULE ORAL
Status: DISCONTINUED | OUTPATIENT
Start: 2024-02-11 | End: 2024-02-15 | Stop reason: HOSPADM

## 2024-02-11 RX ADMIN — ALBUTEROL SULFATE 2.5 MG: 2.5 SOLUTION RESPIRATORY (INHALATION) at 08:07

## 2024-02-11 RX ADMIN — GUAIFENESIN 400 MG: 400 TABLET ORAL at 17:13

## 2024-02-11 RX ADMIN — ALBUTEROL SULFATE 2.5 MG: 2.5 SOLUTION RESPIRATORY (INHALATION) at 17:04

## 2024-02-11 RX ADMIN — HEPARIN SODIUM 5000 UNITS: 10000 INJECTION INTRAVENOUS; SUBCUTANEOUS at 14:22

## 2024-02-11 RX ADMIN — CALCIUM ACETATE 667 MG: 667 CAPSULE ORAL at 17:13

## 2024-02-11 RX ADMIN — HEPARIN SODIUM 5000 UNITS: 10000 INJECTION INTRAVENOUS; SUBCUTANEOUS at 06:41

## 2024-02-11 RX ADMIN — METOPROLOL SUCCINATE 25 MG: 25 TABLET, EXTENDED RELEASE ORAL at 09:14

## 2024-02-11 RX ADMIN — SENNOSIDES 17.2 MG: 8.6 TABLET, COATED ORAL at 20:30

## 2024-02-11 RX ADMIN — GUAIFENESIN 400 MG: 400 TABLET ORAL at 09:14

## 2024-02-11 RX ADMIN — CEFEPIME 1000 MG: 1 INJECTION, POWDER, FOR SOLUTION INTRAMUSCULAR; INTRAVENOUS at 06:40

## 2024-02-11 RX ADMIN — GUAIFENESIN 400 MG: 400 TABLET ORAL at 20:30

## 2024-02-11 RX ADMIN — OXYCODONE 5 MG: 5 TABLET ORAL at 12:23

## 2024-02-11 RX ADMIN — SENNOSIDES 17.2 MG: 8.6 TABLET, COATED ORAL at 09:14

## 2024-02-11 RX ADMIN — HEPARIN SODIUM 5000 UNITS: 10000 INJECTION INTRAVENOUS; SUBCUTANEOUS at 20:50

## 2024-02-11 RX ADMIN — ALBUTEROL SULFATE 2.5 MG: 2.5 SOLUTION RESPIRATORY (INHALATION) at 13:05

## 2024-02-11 ASSESSMENT — PAIN DESCRIPTION - LOCATION
LOCATION: COCCYX
LOCATION: COCCYX

## 2024-02-11 ASSESSMENT — PAIN SCALES - GENERAL
PAINLEVEL_OUTOF10: 5
PAINLEVEL_OUTOF10: 8

## 2024-02-11 ASSESSMENT — PAIN DESCRIPTION - DESCRIPTORS
DESCRIPTORS: ACHING
DESCRIPTORS: ACHING

## 2024-02-11 NOTE — CONSULTS
Lam Nichols MD at [facility] OR    FEMUR FRACTURE SURGERY Right 11/4/2022    RIGHT DISTAL FEMUR OPEN REDUCTION INTERNAL FIXATION ++SYNTHES++ performed by Corona Orta MD at [facility] OR    FOOT SURGERY  2009     right     UPPER GASTROINTESTINAL ENDOSCOPY  2.18.15    Dr. Ayoub, [facility] Findings: Mild Gastrits and Duodenitis, 2cm Hiatal Hernia    UPPER GASTROINTESTINAL ENDOSCOPY N/A 5/8/2020    EGD CONTROL HEMORRHAGE performed by Jared Muñoz MD at [facility]    UPPER GASTROINTESTINAL ENDOSCOPY N/A 5/22/2020    EGD BEDSIDE performed by Michael STANTON MD at [facility] ENDOSCOPY    VASCULAR SURGERY N/A 5/6/2021    INSERTION TUNNELED DIALYSIS CATHETER performed by Lam Nichols MD at [facility] OR    WRIST FRACTURE SURGERY Left 11/4/2022    LEFT DISTAL RADIUS OPEN REDUCTION INTERNAL FIXATION    ++SYNTHES++ performed by Corona Orta MD at [facility] OR       Current Facility-Administered Medications   Medication Dose Route Frequency Provider Last Rate Last Admin    bisacodyl (DULCOLAX) suppository 10 mg  10 mg Rectal Daily PRN José Miguel Avery MD        acetaminophen (TYLENOL) tablet 1,000 mg  1,000 mg Oral Q8H PRN José Miguel Avery MD   1,000 mg at 02/10/24 1707    [START ON 2/12/2024] epoetin derek-epbx (RETACRIT) injection 6,000 Units  6,000 Units SubCUTAneous Once per day on Mon Wed Fri José Miguel Avery MD        insulin lispro (HUMALOG) injection vial 0-4 Units  0-4 Units SubCUTAneous TID  José Miguel Avery MD        insulin lispro (HUMALOG) injection vial 0-4 Units  0-4 Units SubCUTAneous Nightly José Miguel Avery MD        metoprolol succinate (TOPROL XL) extended release tablet 25 mg  25 mg Oral Daily José Miguel Avery MD   25 mg at 02/11/24 0914    [START ON 2/12/2024] midodrine (PROAMATINE) tablet 15 mg  15 mg Oral Once per day on Mon Tue Thu Fri José Miguel Avery MD        polyethylene glycol (GLYCOLAX) packet 17 g  17 g Oral Daily PRN José Miguel Avery MD        senna (SENOKOT) tablet 17.2 mg  2 tablet Oral BID Joés Miguel Avery MD   17.2  02:27   Procalcitonin 0.00 - 0.08 ng/mL 1.37 (H)  2/10/24 10:33   Pro-BNP 0 - 450 pg/mL 37,340 (H)  2/10/24 02:27   Troponin, High Sensitivity 0 - 9 ng/L 86 (H)  2/10/24 10:33   Albumin 3.5 - 5.2 g/dL 2.3 (L)  2/10/24 02:27   Alk Phos 35 - 104 U/L 103  2/10/24 02:27   ALT 0 - 32 U/L 7  2/10/24 02:27   AST 0 - 31 U/L 14  2/10/24 02:27   BILIRUBIN TOTAL 0.0 - 1.2 mg/dL 0.3  2/10/24 02:27   TSH 0.27 - 4.20 uIU/mL 2.70  2/10/24 02:27   SPECIMEN TYPE  .PLEURAL FLUID (C)  2/10/24 09:50  .PLEURAL FLUID (C)  2/10/24 09:50  .PLEURAL FLUID (C)  2/10/24 09:50  .PLEURAL FLUID (C)  2/10/24 09:50   WBC 4.5 - 11.5 k/uL 20.6 (H)  2/10/24 02:27   RBC 3.50 - 5.50 m/uL 3.34 (L)  2/10/24 02:27   Hemoglobin Quant 11.5 - 15.5 g/dL 10.3 (L)  2/10/24 02:27   Hematocrit 34.0 - 48.0 % 34.3  2/10/24 02:27   MCV 80.0 - 99.9 fL 102.7 (H)  2/10/24 02:27   MCH 26.0 - 35.0 pg 30.8  2/10/24 02:27   MCHC 32.0 - 34.5 g/dL 30.0 (L)  2/10/24 02:27   MPV 7.0 - 12.0 fL 10.3  2/10/24 02:27   RDW 11.5 - 15.0 % 15.6 (H)  2/10/24 02:27   Platelet Count 130 - 450 k/uL 314  2/10/24 02:27   Neutrophils % 43.0 - 80.0 % 88 (H)  2/10/24 02:27   Lymphocyte % 20.0 - 42.0 % 4 (L)  2/10/24 02:27   Monocytes % 2.0 - 12.0 % 7  2/10/24 02:27   Eosinophils % 0 - 6 % 1  2/10/24 02:27   Basophils % 0.0 - 2.0 % 0  2/10/24 02:27   Neutrophils Absolute 1.80 - 7.30 k/uL 18.05 (H)  2/10/24 02:27   Lymphocytes Absolute 1.50 - 4.00 k/uL 0.76 (L)  2/10/24 02:27   Monocytes Absolute 0.10 - 0.95 k/uL 1.39 (H)  2/10/24 02:27   Eosinophils Absolute 0.05 - 0.50 k/uL 0.24  2/10/24 02:27   Basophils Absolute 0.00 - 0.20 k/uL 0.07  2/10/24 02:27   Immature Granulocytes 0.0 - 5.0 % 1  2/10/24 02:27   Absolute Immature Granulocyte 0.00 - 0.58 k/uL 0.12  2/10/24 02:27   Prothrombin Time 9.3 - 12.4 sec 12.2  2/10/24 10:33   INR  1.1  2/10/24 10:33   (L): Data is abnormally low  (H): Data is abnormally high  (C): Corrected    Radiology:  CT abdomen/pelvis: IMPRESSION:  1. Large left pleural

## 2024-02-11 NOTE — CONSULTS
[facility]  [facility]  Consultation Note     Admit Date: 2/10/2024  2:16 AM    Reason for Consult:   Breast wound.  Possible empyema    Attending Physician:  José Miguel Avery MD    HISTORY OF PRESENT ILLNESS:             The history is obtained from extensive review of available past medical records. The patient is a 73 y.o. female who is previously known to the ID service.  She presented to the ED at [facility] on 2/10/2024 with respiratory distress and a pulse oximetry of 70% on room air.  On presentation he was afebrile, tachypneic and tachycardic.  Vital signs have normalized.  Rapid SARS-CoV-2, influenza and respiratory panel were negative.  Creatinine was 3.5.  proBNP was 37,340.  Transaminases were normal.  White count was 20.6 and has normalized.  CT showed left pleural effusion.  She underwent a thoracentesis with 1700 cc of serosanguineous fluid.  Cell count showed 34,000 RBCs, 1300 WBC with predominance of monocytes.  Seen by general surgery.  Sacral decubitus ulcer were clean.  The left breast wound had an odor and they recommended debridement.  They also noted that the family had declined debridement during her previous admission.    Past Medical History:        Diagnosis Date    Anemia in chronic kidney disease     Anxiety and depression     Arthritis     Chronic pain syndrome     COVID-19 05/2020    COVID-19     Decreased dorsalis pedis pulse 10/25/2022    Diabetes mellitus (HCC)     Dysphagia, oral phase     ESRD on hemodialysis (HCC) 10/25/2022    GERD (gastroesophageal reflux disease)     Hemodialysis patient ([facility])     M-W-F    Hyperkalemia     Hypertension     Hypertensive kidney disease with stage 4 chronic kidney disease (HCC)     Insomnia     Kidney stones     MDD (major depressive disorder)     Moderate protein-calorie malnutrition (HCC)     Morbid obesity (HCC)     Muscle weakness (generalized)     Obesity     Pressure injury of sacral region, stage 3 ([facility])

## 2024-02-11 NOTE — PROGRESS NOTES
Pulmonary Progress Note    Admit Date: 2/10/2024                            PCP: Rj Casillas MD  Principal Problem:    Acute respiratory failure with hypoxia (HCC)  Active Problems:    Pleural effusion on left  Resolved Problems:    * No resolved hospital problems. *      Subjective:  On 3 L with no complaints of shortness of breath, does have moist nonproductive cough      Medications:       [START ON 2024] epoetin derek-epbx  6,000 Units SubCUTAneous Once per day on     insulin lispro  0-4 Units SubCUTAneous TID WC    insulin lispro  0-4 Units SubCUTAneous Nightly    metoprolol succinate  25 mg Oral Daily    [START ON 2024] midodrine  15 mg Oral Once per day on     senna  2 tablet Oral BID    heparin (porcine)  5,000 Units SubCUTAneous 3 times per day    cefepime  1,000 mg IntraVENous Q24H    albuterol  2.5 mg Nebulization 4x Daily RT    guaiFENesin  400 mg Oral TID    vancomycin (VANCOCIN) intermittent dosing (placeholder)   Other RX Placeholder       Vitals:  VITALS:  /80   Pulse 76   Temp 97 °F (36.1 °C) (Axillary)   Resp 16   Ht 1.549 m (5' 1\")   Wt 73.2 kg (161 lb 4.8 oz)   SpO2 100%   BMI 30.48 kg/m²   24HR INTAKE/OUTPUT:    Intake/Output Summary (Last 24 hours) at 2024 0907  Last data filed at 2024 0640  Gross per 24 hour   Intake 1650 ml   Output 1980 ml   Net -330 ml     CURRENT PULSE OXIMETRY:  SpO2: 100 %  24HR PULSE OXIMETRY RANGE:  SpO2  Av.2 %  Min: 95 %  Max: 100 %  CVP:    VENT SETTINGS:      Additional Respiratory Assessments  Pulse: 76  Respirations: 16  SpO2: 100 %      EXAM:  General: No distress. Alert.  3 L  Eyes:  No sclera icterus. No conjunctival injection.  ENT: No discharge. Pharynx clear.  Neck: Trachea midline.   Resp: No accessory muscle use. No crackles. No wheezing.  Scattered rhonchi on right.  Diminished left lower  Chest: Left chest tube intact to suction no air leak or subcutaneous emphysema noted  CV: Regular

## 2024-02-11 NOTE — PROGRESS NOTES
02/10/24 1051     Order Status: Completed Updated: 02/10/24 1056     Narrative:       EXAMINATION:  ONE XRAY VIEW OF THE CHEST    2/10/2024 10:09 am    COMPARISON:  02/10/2024.    HISTORY:  ORDERING SYSTEM PROVIDED HISTORY: left sided chest tube  TECHNOLOGIST PROVIDED HISTORY:  Reason for exam:->left sided chest tube    FINDINGS:  A small bore left chest tube has been placed.  The pleural effusion has  significantly decreased in size.  The study reveals minimal left basilar  atelectasis and/or pneumonitis.  There is minimal patchy parenchymal density  projecting over the right lower lung concerning for pneumonia and or  atelectasis.  There are no signs of pneumothorax.  The pulmonary vasculature  and heart size appears within the normal range otherwise.     Impression:       1. Significant interval improvement the left pleural effusion  2. Status post placement of left chest tube  3. No signs of pneumothorax     CT CHEST WO CONTRAST [[id number]] Collected: 02/10/24 0639     Order Status: Completed Updated: 02/10/24 0645     Narrative:       EXAMINATION:  CT OF THE CHEST WITHOUT CONTRAST 2/10/2024 6:17 am    TECHNIQUE:  CT of the chest was performed without the administration of intravenous  contrast. Multiplanar reformatted images are provided for review. Automated  exposure control, iterative reconstruction, and/or weight based adjustment of  the mA/kV was utilized to reduce the radiation dose to as low as reasonably  achievable.    COMPARISON:  02/09/2023    HISTORY:  ORDERING SYSTEM PROVIDED HISTORY: white out left lung on CXR  TECHNOLOGIST PROVIDED HISTORY:  Reason for exam:->white out left lung on CXR  Decision Support Exception - unselect if not a suspected or confirmed  emergency medical condition->Emergency Medical Condition (MA)    FINDINGS:  Mediastinum: The heart is normal in size without pericardial effusion.  There  is dense annular calcification of the mitral valve.  There are calcified  mediastinal  lymph nodes.    Lungs/pleura: Large left pleural effusion with near complete left lung  atelectasis.  There are patchy ground-glass opacities within the right lung.  No pneumothorax.    Upper Abdomen:  Limited images of the upper abdomen demonstrate no acute  abnormality.    Soft Tissues/Bones:  No acute abnormality of the visualized osseous  structures.     Impression:       1. Large left pleural effusion with near complete left lung atelectasis.  2. Patchy ground-glass opacities right lung may be infectious or inflammatory  in etiology.     XR CHEST PORTABLE [[id number]] Collected: 02/10/24 0305     Order Status: Completed Updated: 02/10/24 0311     Narrative:       EXAMINATION:  ONE XRAY VIEW OF THE CHEST    2/10/2024 2:17 am    COMPARISON:  01/14/2024    HISTORY:  ORDERING SYSTEM PROVIDED HISTORY: shortness of breath  TECHNOLOGIST PROVIDED HISTORY:  Reason for exam:->shortness of breath    FINDINGS:  There is near complete left lung white out.  The left heart border is  obscured.  Mild rightward mediastinal shift, although this may be accentuated  due to patient positioning.  The right lung is clear.  No pneumothorax.     Impression:       Near complete left lung white out with mild rightward mediastinal shift. This  may be due to a combination of pleural effusion, atelectasis, and/or  pneumonia.       2/08/2022 ECHO Summary   Technically limited study with off axis images.   Left ventricular internal dimensions were normal in diastole and systole.   Abnormal (paradoxical) motion consistent with conduction abnormality.   No regional wall motion abnormalities seen.   The left atrium is moderately dilated.   Focal calcification mitral valve leaflets and subvalvular structures.   Severe mitral annular calcification.   Mild mitral regurgitation is present.   Moderate functional mitral stenosis .   The aortic valve appears mildly sclerotic.       Objective:     Vitals: /73   Pulse 86   Temp 97.3 °F (36.3 °C)

## 2024-02-11 NOTE — PROGRESS NOTES
Surgery       Full consult to follow. Patient seen/examined 2/11/2024. I performed the key aspects of the physical exam.  I have independently reviewed the record including all pertinent and new radiology images and laboratory findings as well as reviewed all pertinent provider documentation.    73 year old female with sacral decubitus ulcer and breast wound. Sacrum is clean without signs of infection. Breast wound has foul odor and could use debridement. Patient and family refused debridement last admission. Will need patient and family consent prior to debridement.     Kirstin Carrillo MD

## 2024-02-11 NOTE — PROGRESS NOTES
Spoke with Dr. Norton's answering service, consult was already put out at 10am and they are aware.

## 2024-02-11 NOTE — PROGRESS NOTES
Pharmacy Consultation Note  (Antibiotic Dosing and Monitoring)    Initial consult date: 2/10  Consulting physician/provider: Bennie  Drug: Vancomycin  Indication: Sepsiis    Age/  Gender Height Weight IBW  Allergy Information   73 y.o./female 152.4 cm (5') 75.8 kg (167 lb 1.7 oz)     Ideal body weight: 47.8 kg (105 lb 6.1 oz)  Adjusted ideal body weight: 57.9 kg (127 lb 12 oz)   Patient has no known allergies.      Renal Function:  Recent Labs     02/10/24  0227   BUN 28*   CREATININE 3.5*         Intake/Output Summary (Last 24 hours) at 2/11/2024 0853  Last data filed at 2/11/2024 0640  Gross per 24 hour   Intake 1650 ml   Output 1980 ml   Net -330 ml         Vancomycin Monitoring:  Trough:  No results for input(s): \"VANCOTROUGH\" in the last 72 hours.  Random:    Recent Labs     02/11/24  1240   VANCORANDOM 24.7       Vancomycin Administration Times:  Recent vancomycin administrations                     vancomycin (VANCOCIN) 2,250 mg in sodium chloride 0.9 % 500 mL IVPB (mg) 2,250 mg New Bag 02/10/24 0753                    Assessment:  Patient is a 73 y.o. female who has been initiated on vancomycin  Estimated Creatinine Clearance: 13 mL/min (A) (based on SCr of 3.5 mg/dL (H)).  To dose vancomycin, pharmacy will be utilizing dosing based off of levels due to patient requiring hemodialysis  Random level today = 24.7 mcg/mL    Plan:  No additional vancomycin today  Will continue vancomycin dosed by level and dialysis schedule  Pharmacy to follow      Susan Moya, PharmD, BCPS, BCCCP 2/11/2024 8:53 AM

## 2024-02-11 NOTE — PROGRESS NOTES
[facility] Hospitalist Progress Note    Admitting Date and Time: 2/10/2024  2:16 AM  Admit Dx: Pleural effusion on left [J90]  Acute respiratory failure with hypoxia (HCC) [J96.01]    Subjective:  Patient is being followed for Pleural effusion on left [J90]  Acute respiratory failure with hypoxia (HCC) [J96.01]   Pt feels breathing improved.  Per RN: No major concerns, refused ABGs.    ROS: denies fever, chills, cp, sob, n/v, HA unless stated above.      [START ON 2/12/2024] epoetin derek-epbx  6,000 Units SubCUTAneous Once per day on Mon Wed Fri    insulin lispro  0-4 Units SubCUTAneous TID WC    insulin lispro  0-4 Units SubCUTAneous Nightly    metoprolol succinate  25 mg Oral Daily    [START ON 2/12/2024] midodrine  15 mg Oral Once per day on Mon Tue Thu Fri    senna  2 tablet Oral BID    heparin (porcine)  5,000 Units SubCUTAneous 3 times per day    cefepime  1,000 mg IntraVENous Q24H    albuterol  2.5 mg Nebulization 4x Daily RT    guaiFENesin  400 mg Oral TID    vancomycin (VANCOCIN) intermittent dosing (placeholder)   Other RX Placeholder     bisacodyl, 10 mg, Daily PRN  acetaminophen, 1,000 mg, Q8H PRN  polyethylene glycol, 17 g, Daily PRN  white petrolatum, , BID PRN  prochlorperazine, 10 mg, Q6H PRN  oxyCODONE, 5 mg, Q6H PRN         Objective:    /73   Pulse 86   Temp 97.3 °F (36.3 °C) (Axillary)   Resp 18   Ht 1.549 m (5' 1\")   Wt 73.2 kg (161 lb 4.8 oz)   SpO2 92%   BMI 30.48 kg/m²     General Appearance: alert and oriented to person, place and time and in no acute distress  Skin: warm and dry, Left breast wound, multiple wounds/ abrasions   Head: normocephalic and atraumatic  Eyes: pupils equal, round, and reactive to light, extraocular eye movements intact, conjunctivae normal  Neck: neck supple and non tender without mass   Pulmonary/Chest: Lungs are diminished bilaterally left > rt no crackles no wheezing. Bpap+, left chest tube + Cardiovascular: normal rate, normal S1 and S2 and

## 2024-02-11 NOTE — PROGRESS NOTES
4 Eyes Skin Assessment     NAME:  Marisela Mcguire  YOB: 1950  MEDICAL RECORD NUMBER:  [id number]    The patient is being assessed for  Admission    I agree that at least one RN has performed a thorough Head to Toe Skin Assessment on the patient. ALL assessment sites listed below have been assessed.      Areas assessed by both nurses:    Head, Face, Ears, Shoulders, Back, Chest, Arms, Elbows, Hands, Sacrum. Buttock, Coccyx, Ischium, Legs. Feet and Heels, and Under Medical Devices         Does the Patient have a Wound? Yes wound(s) were present on assessment. LDA wound assessment was Initiated and completed by RN       Herbert Prevention initiated by RN: Yes  Wound Care Orders initiated by RN: Yes    Pressure Injury (Stage 3,4, Unstageable, DTI, NWPT, and Complex wounds) if present, place Wound referral order by RN under : Yes    New Ostomies, if present place, Ostomy referral order under : No     Nurse 1 eSignature: Electronically signed by Taisha Mcelroy RN on 2/11/24 at 2:01 AM EST    **SHARE this note so that the co-signing nurse can place an eSignature**    Nurse 2 eSignature: Electronically signed by Maria M Mcbride RN on 2/11/24 at 6:45 AM EST

## 2024-02-12 LAB
ALBUMIN SERPL-MCNC: 2.3 G/DL (ref 3.5–5.2)
ALP SERPL-CCNC: 93 U/L (ref 35–104)
ALT SERPL-CCNC: <5 U/L (ref 0–32)
ANION GAP SERPL CALCULATED.3IONS-SCNC: 10 MMOL/L (ref 7–16)
AST SERPL-CCNC: 9 U/L (ref 0–31)
BASOPHILS # BLD: 0.05 K/UL (ref 0–0.2)
BASOPHILS NFR BLD: 1 % (ref 0–2)
BILIRUB SERPL-MCNC: 0.2 MG/DL (ref 0–1.2)
BUN SERPL-MCNC: 40 MG/DL (ref 6–23)
CALCIUM SERPL-MCNC: 8.5 MG/DL (ref 8.6–10.2)
CHLORIDE SERPL-SCNC: 100 MMOL/L (ref 98–107)
CO2 SERPL-SCNC: 27 MMOL/L (ref 22–29)
CREAT SERPL-MCNC: 5 MG/DL (ref 0.5–1)
EKG ATRIAL RATE: 110 BPM
EKG Q-T INTERVAL: 374 MS
EKG QRS DURATION: 134 MS
EKG QTC CALCULATION (BAZETT): 532 MS
EKG R AXIS: 3 DEGREES
EKG T AXIS: 160 DEGREES
EKG VENTRICULAR RATE: 122 BPM
EOSINOPHIL # BLD: 0.3 K/UL (ref 0.05–0.5)
EOSINOPHILS RELATIVE PERCENT: 3 % (ref 0–6)
ERYTHROCYTE [DISTWIDTH] IN BLOOD BY AUTOMATED COUNT: 15.4 % (ref 11.5–15)
GFR SERPL CREATININE-BSD FRML MDRD: 8 ML/MIN/1.73M2
GLUCOSE BLD-MCNC: 107 MG/DL (ref 74–99)
GLUCOSE BLD-MCNC: 164 MG/DL (ref 74–99)
GLUCOSE BLD-MCNC: 203 MG/DL (ref 74–99)
GLUCOSE BLD-MCNC: 87 MG/DL (ref 74–99)
GLUCOSE SERPL-MCNC: 149 MG/DL (ref 74–99)
HBV SURFACE AB SERPL IA-ACNC: <3.1 MIU/ML (ref 0–9.99)
HBV SURFACE AG SERPL QL IA: NONREACTIVE
HCT VFR BLD AUTO: 29.8 % (ref 34–48)
HGB BLD-MCNC: 9 G/DL (ref 11.5–15.5)
IMM GRANULOCYTES # BLD AUTO: 0.06 K/UL (ref 0–0.58)
IMM GRANULOCYTES NFR BLD: 1 % (ref 0–5)
LYMPHOCYTES NFR BLD: 0.65 K/UL (ref 1.5–4)
LYMPHOCYTES RELATIVE PERCENT: 7 % (ref 20–42)
MAGNESIUM SERPL-MCNC: 2 MG/DL (ref 1.6–2.6)
MCH RBC QN AUTO: 30.4 PG (ref 26–35)
MCHC RBC AUTO-ENTMCNC: 30.2 G/DL (ref 32–34.5)
MCV RBC AUTO: 100.7 FL (ref 80–99.9)
MICROORGANISM SPEC CULT: ABNORMAL
MONOCYTES NFR BLD: 0.72 K/UL (ref 0.1–0.95)
MONOCYTES NFR BLD: 7 % (ref 2–12)
NEUTROPHILS NFR BLD: 82 % (ref 43–80)
NEUTS SEG NFR BLD: 7.95 K/UL (ref 1.8–7.3)
PHOSPHATE SERPL-MCNC: 5.6 MG/DL (ref 2.5–4.5)
PLATELET # BLD AUTO: 238 K/UL (ref 130–450)
PMV BLD AUTO: 10.2 FL (ref 7–12)
POTASSIUM SERPL-SCNC: 4.5 MMOL/L (ref 3.5–5)
PROT SERPL-MCNC: 5.4 G/DL (ref 6.4–8.3)
RBC # BLD AUTO: 2.96 M/UL (ref 3.5–5.5)
SODIUM SERPL-SCNC: 137 MMOL/L (ref 132–146)
SPECIMEN DESCRIPTION: ABNORMAL
WBC OTHER # BLD: 9.7 K/UL (ref 4.5–11.5)

## 2024-02-12 PROCEDURE — 84100 ASSAY OF PHOSPHORUS: CPT

## 2024-02-12 PROCEDURE — 94640 AIRWAY INHALATION TREATMENT: CPT

## 2024-02-12 PROCEDURE — 83735 ASSAY OF MAGNESIUM: CPT

## 2024-02-12 PROCEDURE — 6360000002 HC RX W HCPCS: Performed by: CLINICAL NURSE SPECIALIST

## 2024-02-12 PROCEDURE — 6360000002 HC RX W HCPCS: Performed by: STUDENT IN AN ORGANIZED HEALTH CARE EDUCATION/TRAINING PROGRAM

## 2024-02-12 PROCEDURE — 6370000000 HC RX 637 (ALT 250 FOR IP): Performed by: INTERNAL MEDICINE

## 2024-02-12 PROCEDURE — 6370000000 HC RX 637 (ALT 250 FOR IP): Performed by: NURSE PRACTITIONER

## 2024-02-12 PROCEDURE — 2580000003 HC RX 258: Performed by: STUDENT IN AN ORGANIZED HEALTH CARE EDUCATION/TRAINING PROGRAM

## 2024-02-12 PROCEDURE — 6370000000 HC RX 637 (ALT 250 FOR IP): Performed by: CLINICAL NURSE SPECIALIST

## 2024-02-12 PROCEDURE — 85025 COMPLETE CBC W/AUTO DIFF WBC: CPT

## 2024-02-12 PROCEDURE — 2060000000 HC ICU INTERMEDIATE R&B

## 2024-02-12 PROCEDURE — 94660 CPAP INITIATION&MGMT: CPT

## 2024-02-12 PROCEDURE — 93010 ELECTROCARDIOGRAM REPORT: CPT | Performed by: INTERNAL MEDICINE

## 2024-02-12 PROCEDURE — 6370000000 HC RX 637 (ALT 250 FOR IP): Performed by: STUDENT IN AN ORGANIZED HEALTH CARE EDUCATION/TRAINING PROGRAM

## 2024-02-12 PROCEDURE — 99223 1ST HOSP IP/OBS HIGH 75: CPT | Performed by: NURSE PRACTITIONER

## 2024-02-12 PROCEDURE — 90935 HEMODIALYSIS ONE EVALUATION: CPT

## 2024-02-12 PROCEDURE — 99232 SBSQ HOSP IP/OBS MODERATE 35: CPT | Performed by: INTERNAL MEDICINE

## 2024-02-12 PROCEDURE — 2700000000 HC OXYGEN THERAPY PER DAY

## 2024-02-12 PROCEDURE — 82962 GLUCOSE BLOOD TEST: CPT

## 2024-02-12 PROCEDURE — 80053 COMPREHEN METABOLIC PANEL: CPT

## 2024-02-12 RX ORDER — OXYCODONE HYDROCHLORIDE 5 MG/1
5 TABLET ORAL EVERY 4 HOURS PRN
Status: DISCONTINUED | OUTPATIENT
Start: 2024-02-12 | End: 2024-02-15 | Stop reason: HOSPADM

## 2024-02-12 RX ADMIN — MUPIROCIN: 20 OINTMENT TOPICAL at 20:31

## 2024-02-12 RX ADMIN — OXYCODONE 5 MG: 5 TABLET ORAL at 12:24

## 2024-02-12 RX ADMIN — CEFEPIME 1000 MG: 1 INJECTION, POWDER, FOR SOLUTION INTRAMUSCULAR; INTRAVENOUS at 05:53

## 2024-02-12 RX ADMIN — OXYCODONE 5 MG: 5 TABLET ORAL at 21:43

## 2024-02-12 RX ADMIN — ALBUTEROL SULFATE 2.5 MG: 2.5 SOLUTION RESPIRATORY (INHALATION) at 21:00

## 2024-02-12 RX ADMIN — HEPARIN SODIUM 5000 UNITS: 10000 INJECTION INTRAVENOUS; SUBCUTANEOUS at 05:56

## 2024-02-12 RX ADMIN — MIDODRINE HYDROCHLORIDE 15 MG: 5 TABLET ORAL at 12:23

## 2024-02-12 RX ADMIN — GUAIFENESIN 400 MG: 400 TABLET ORAL at 20:25

## 2024-02-12 RX ADMIN — SENNOSIDES 17.2 MG: 8.6 TABLET, COATED ORAL at 20:25

## 2024-02-12 RX ADMIN — HEPARIN SODIUM 5000 UNITS: 10000 INJECTION INTRAVENOUS; SUBCUTANEOUS at 21:44

## 2024-02-12 RX ADMIN — ALBUTEROL SULFATE 2.5 MG: 2.5 SOLUTION RESPIRATORY (INHALATION) at 08:39

## 2024-02-12 ASSESSMENT — PAIN SCALES - GENERAL
PAINLEVEL_OUTOF10: 10
PAINLEVEL_OUTOF10: 4
PAINLEVEL_OUTOF10: 10

## 2024-02-12 ASSESSMENT — PAIN DESCRIPTION - ORIENTATION
ORIENTATION: LEFT
ORIENTATION: LEFT

## 2024-02-12 ASSESSMENT — PAIN DESCRIPTION - LOCATION
LOCATION: COCCYX
LOCATION: COCCYX;BREAST
LOCATION: COCCYX

## 2024-02-12 ASSESSMENT — PAIN DESCRIPTION - DESCRIPTORS: DESCRIPTORS: ACHING;DISCOMFORT

## 2024-02-12 NOTE — PROGRESS NOTES
During bedside report patient screaming in pain and to \"help her poop\". This RN and nightshift one went to reposition and turn patient and a large amount of impacted stool was visible. This RN helped relieve some of it but a large amount still stuck inside. Charge RN messaged gen surgery to update. Throughout that episode and this AM patient still screaming in pain and asking to \"die alone\" and saying \"please let me die\". Pt is refusing all medicine and pain medicine and will not eat. Spoke with Dr. Araujo about this situation and palliative medicine was consulted.

## 2024-02-12 NOTE — PROGRESS NOTES
Consult coccyx wound  Awake and verbal.  Heels intact. Rolled to rt side. Some dressings removed.  Screaming she does not want to be touched and wants to die  Wounds as below  Coccyx wound below, red, clean. Scant drainage      Left lower back below    Left hip below- 2 areas of soft brown      Left breast wound-large SS drainage yellow/brown  Slight odor  Appox 5x5x3   Opticel applied to wounds  No foam used due to painful removal  Lo air loss in place  Remaining wounds not examined due to discomfort  Pili Covington, CNS, Wound Care

## 2024-02-12 NOTE — PROGRESS NOTES
Attempted to wash up patient and slid her up in bed, Pt stated \"wait til I die to move me up and leave alone\" RNMaribel notified

## 2024-02-12 NOTE — PROGRESS NOTES
Pharmacy Consultation Note  (Antibiotic Dosing and Monitoring)    Initial consult date: 2/10  Consulting physician/provider: Bennie  Drug: Vancomycin  Indication: Sepsis    Note vancomycin discontinued. Clinical pharmacy will sign-off. Please re-consult if we can be of further assistance.    Danielito Hilton RPH, PharmD, BCCCP  2/12/2024  10:13 AM

## 2024-02-12 NOTE — CONSULTS
SpO2 99%   BMI 30.48 kg/m²   Constitutional:  NAD, awake, alert  Lungs:  NC, easy and unlabored respirations  Abd:  Soft  Ext:  Moving all extremities, no edema, pulses present  Skin:  Warm and dry  Neuro:  Alert, grossly nonfocal, intermittently confused    Objective data reviewed: labs, images, records, medication use, vitals, and chart    Discussed patient and the plan of care with the other IDT members: Floor Nurse, Patient, and Family    Time/Communication  Greater than 50% of time spent, total 75 minutes in counseling and coordination of care at the bedside regarding goals of care and diagnosis and prognosis.    Thank you for allowing Palliative Medicine to participate in the care of Marisela Mcguire.

## 2024-02-12 NOTE — PROGRESS NOTES
Received call from microbiology, patient has 4/4 positive blood cultures. Call placed to Dr. Araujo to update, await call back.

## 2024-02-12 NOTE — CARE COORDINATION
Social Work;    Reviewed chart notes. ERSD on HD, sacral & breast wounds.  Advised in a.m. rounds that Mrs. Mcguire is expressing that she is in great pain and wants to die.  Palliative care was consulted. Social service attempted to meet with Mrs. Mcguire \"Chasity\" but she was in dialysis. Social service placed a call to daughter Inga, advised her about social work role and discussed discharge planning.  Inga advises that she wants to discuss hospice with her mother later today before making any decisions.  Social work offered support and left a hospice list of choices in the room.  Denise at [facility] advised that Chasity is a bedhold and she will only need to get the dialysis re-approved prior to return.  Present plan is to return to [facility]. Social work to follow-up tomorrow with patient & daughter. (N-17, ambulance form in chart)    Electronically signed by SHANTA Leo on 2/12/2024 at 2:14 PM

## 2024-02-12 NOTE — PROGRESS NOTES
[facility]  [facility]  Progress Note    SUBJECTIVE:  Chief Complaint   Patient presents with    Shortness of Breath     SOB, hypoxic on roomair (baseline)     No new complaints per the patient feels miserable.  No fever.  No dyspnea.    Review of systems:  As stated above in the chief complaint, otherwise negative.    Medications:  Scheduled Meds:   calcium acetate  1 capsule Oral TID WC    epoetin derek-epbx  6,000 Units SubCUTAneous Once per day on     insulin lispro  0-4 Units SubCUTAneous TID WC    insulin lispro  0-4 Units SubCUTAneous Nightly    metoprolol succinate  25 mg Oral Daily    midodrine  15 mg Oral Once per day on     senna  2 tablet Oral BID    heparin (porcine)  5,000 Units SubCUTAneous 3 times per day    albuterol  2.5 mg Nebulization 4x Daily RT    guaiFENesin  400 mg Oral TID     Continuous Infusions:  PRN Meds:bisacodyl, acetaminophen, polyethylene glycol, white petrolatum, prochlorperazine, oxyCODONE    OBJECTIVE:  BP (!) 127/48   Pulse 89   Temp 98.1 °F (36.7 °C) (Oral)   Resp 18   Ht 1.549 m (5' 1\")   Wt 73.2 kg (161 lb 4.8 oz)   SpO2 99%   BMI 30.48 kg/m²   Temp  Av.7 °F (36.5 °C)  Min: 97.3 °F (36.3 °C)  Max: 98.1 °F (36.7 °C)  Constitutional: The patient is awake, alert, and oriented.  Lying in bed.  She is in no distress.  Skin: Warm and dry.  Left breast wound covered.  HEENT: Round and reactive pupils.  Moist mucous membranes.  No ulcerations or thrush.  Neck: Supple to movements.   Chest: No use of accessory muscles to breathe. Symmetrical expansion.  No wheezing, crackles or rhonchi.  Left small bore chest tube with serosanguineous fluid  Cardiovascular: S1 and S2 are rhythmic and regular. No murmurs appreciated.   Abdomen: Positive bowel sounds to auscultation. Benign to palpation.        : Stage IV sacral decubitus and buttock wounds covered with dressings.  Extremities: Minimal edema.  Lines:  Peripheral.    Laboratory and Tests:  Lab Results   Component Value Date    .0 (H) 11/04/2023    CRP 8.0 (H) 06/03/2023    CRP 6.3 (H) 05/16/2023     Lab Results   Component Value Date    SEDRATE 131 (H) 11/04/2023    SEDRATE 130 (H) 06/03/2023    SEDRATE 104 (H) 05/16/2023       Radiology:      Microbiology:   Rapid SARS-CoV-2: Negative  Rapid influenza: Negative  Respiratory panel: Negative  Blood cultures 2/10/2020 [facility] 4: Staphylococcus epidermidis in 1 of 2 sets  Nares screen MRSA: Positive  Pleural fluid 2/10/2024: Negative    Recent Labs     02/10/24  1033 02/10/24  1240   PROCAL 1.37* 2.10*       ASSESSMENT:  Large left pleural effusion causing respiratory distress.  Underwent thoracentesis of bloody fluid.  Empyema is less likely.  Possible malignancy  Leukocytosis secondary to respiratory distress-resolved  Chronic left breast wound.  Not infected  Stage IV sacral decubitus ulcer.  Not infected  Bilateral buttock decubitus ulcers.  No evidence of infection    PLAN:  Cefepime and Vancomycin have been appropriately discontinued  Continue wound care  No debridement per surgery    Spoke with nursing    Luis Angel Gonzalez MD  10:57 AM  2/12/2024

## 2024-02-12 NOTE — PROGRESS NOTES
Pulmonary Progress Note  2/12/2024 11:10 AM  Subjective:   Admit Date: 2/10/2024  PCP: Rj Casillas MD  Interval History: Resting in bed.  Says she is doing terrible.    Diet: ADULT DIET; Regular  Little short of breath little cough some chest pain with the tube    Data:   Scheduled Meds:    collagenase   Topical Daily    calcium acetate  1 capsule Oral TID WC    epoetin derek-epbx  6,000 Units SubCUTAneous Once per day on Mon Wed Fri    insulin lispro  0-4 Units SubCUTAneous TID WC    insulin lispro  0-4 Units SubCUTAneous Nightly    metoprolol succinate  25 mg Oral Daily    midodrine  15 mg Oral Once per day on Mon Tue Thu Fri    senna  2 tablet Oral BID    heparin (porcine)  5,000 Units SubCUTAneous 3 times per day    albuterol  2.5 mg Nebulization 4x Daily RT    guaiFENesin  400 mg Oral TID       Continuous Infusions:     PRN Meds: bisacodyl, acetaminophen, polyethylene glycol, white petrolatum, prochlorperazine, oxyCODONE    I/O last 3 completed shifts:  In: -   Out: 160 [Chest Tube:160]    No intake/output data recorded.      Intake/Output Summary (Last 24 hours) at 2/12/2024 1110  Last data filed at 2/12/2024 0555  Gross per 24 hour   Intake --   Output 80 ml   Net -80 ml       Patient Vitals for the past 96 hrs (Last 3 readings):   Weight   02/11/24 0444 73.2 kg (161 lb 4.8 oz)   02/10/24 1540 89.8 kg (197 lb 15.6 oz)   02/10/24 1335 91.4 kg (201 lb 8 oz)         Recent Labs     02/10/24  0227 02/11/24  1240   WBC 20.6* 8.5   HGB 10.3* 9.8*   HCT 34.3 32.5*   .7* 100.9*    204     Recent Labs     02/10/24  0227 02/11/24  1240    138   K 4.7 4.2   CL 91* 97*   CO2 33* 30*   BUN 28* 33*   CREATININE 3.5* 4.6*   PHOS  --  5.7*     Recent Labs     02/10/24  0227 02/11/24  1240   PROT 6.3* 5.7*   ALKPHOS 103 89   ALT 7 7   AST 14 13   BILITOT 0.3 0.3     Recent Labs     02/10/24  1033   INR 1.1       CPK:  Lab Results   Component Value Date    CKTOTAL 25 02/07/2022    CKTOTAL 24 02/07/2022

## 2024-02-12 NOTE — PLAN OF CARE
Problem: Safety - Adult  Goal: Free from fall injury  Outcome: Progressing     Problem: Discharge Planning  Goal: Discharge to home or other facility with appropriate resources  Outcome: Progressing     Problem: Skin/Tissue Integrity  Goal: Absence of new skin breakdown  Description: 1.  Monitor for areas of redness and/or skin breakdown  2.  Assess vascular access sites hourly  3.  Every 4-6 hours minimum:  Change oxygen saturation probe site  4.  Every 4-6 hours:  If on nasal continuous positive airway pressure, respiratory therapy assess nares and determine need for appliance change or resting period.  Outcome: Progressing     Problem: ABCDS Injury Assessment  Goal: Absence of physical injury  Outcome: Progressing     Problem: Pain  Goal: Verbalizes/displays adequate comfort level or baseline comfort level  Outcome: Progressing     Problem: Chronic Conditions and Co-morbidities  Goal: Patient's chronic conditions and co-morbidity symptoms are monitored and maintained or improved  Outcome: Progressing

## 2024-02-12 NOTE — FLOWSHEET NOTE
Pt completed 4 hrs of HD on a 3K bath with 1.5L of UF removed safely.   02/12/24 1725   Vital Signs   BP (!) 156/69   Temp 97.4 °F (36.3 °C)   Pulse 71   Respirations 18   Weight - Scale 71.6 kg (157 lb 13.6 oz)   Weight Method Bed scale   Percent Weight Change -2.05   Pain Assessment   Pain Assessment None - Denies Pain   Pain Level 4   Pain Location Coccyx   Pain Orientation Left   Post-Hemodialysis Assessment   Post-Treatment Procedures Blood returned   Machine Disinfection Process Acid/Vinegar Clean;Heat Disinfect;Exterior Machine Disinfection   Rinseback Volume (ml) 300 ml   Blood Volume Processed (Liters) 90.1 L   Dialyzer Clearance Moderately streaked   Duration of Treatment (minutes) 240 minutes   Hemodialysis Intake (ml) 300 ml   Hemodialysis Output (ml) 1800 ml   NET Removed (ml) 1500   Tolerated Treatment Good   Patient Response to Treatment tolerated well; post report to floor RN; stable at d/c   Bilateral Breath Sounds Diminished   Edema Generalized   Edema Generalized Trace   Time Off 1715   Patient Disposition Return to room   Observations & Evaluations   Level of Consciousness 0   Oriented X 2   Heart Rhythm Regular   Respiratory Quality/Effort Unlabored   O2 Device Nasal cannula   Skin Color Pale   Skin Condition/Temp Dry;Warm   Abdomen Inspection Distended

## 2024-02-12 NOTE — PROGRESS NOTES
[facility] Hospitalist Progress Note    Admitting Date and Time: 2/10/2024  2:16 AM  Admit Dx: Pleural effusion on left [J90]  Acute respiratory failure with hypoxia (HCC) [J96.01]    Subjective:  Patient is being followed for Pleural effusion on left [J90]  Acute respiratory failure with hypoxia (HCC) [J96.01]   Pt seen and examined this morning  No acute events overnight  Lying in bed, moaning in pain.  States she is unable to have a bowel movement due to pain.  States she just wants us to let her die    ROS: denies fever, chills, cp, sob, n/v, HA unless stated above.      collagenase   Topical Daily    mupirocin   Each Nostril BID    calcium acetate  1 capsule Oral TID WC    epoetin derek-epbx  6,000 Units SubCUTAneous Once per day on Mon Wed Fri    insulin lispro  0-4 Units SubCUTAneous TID WC    insulin lispro  0-4 Units SubCUTAneous Nightly    metoprolol succinate  25 mg Oral Daily    midodrine  15 mg Oral Once per day on Mon Tue Thu Fri    senna  2 tablet Oral BID    heparin (porcine)  5,000 Units SubCUTAneous 3 times per day    albuterol  2.5 mg Nebulization 4x Daily RT    guaiFENesin  400 mg Oral TID     oxyCODONE, 5 mg, Q4H PRN  bisacodyl, 10 mg, Daily PRN  acetaminophen, 1,000 mg, Q8H PRN  polyethylene glycol, 17 g, Daily PRN  white petrolatum, , BID PRN  prochlorperazine, 10 mg, Q6H PRN         Objective:    BP (!) 111/55   Pulse 55   Temp 97.4 °F (36.3 °C) (Oral)   Resp 18   Ht 1.549 m (5' 1\")   Wt 73.2 kg (161 lb 4.8 oz)   SpO2 100%   BMI 30.48 kg/m²   \  General Appearance: Alert and awake, in moderate distress  Skin: warm and dry  Head: normocephalic and atraumatic  Eyes: pupils equal, round, and reactive to light, extraocular eye movements intact, conjunctivae normal  Neck: neck supple and non tender without mass   Pulmonary/Chest: Coarse BS bilaterally, no respiratory distress  Cardiovascular: normal rate, normal S1 and S2 and no carotid bruits  Abdomen: soft, non-tender,  non-distended, normal bowel sounds, no masses or organomegaly  Extremities: no cyanosis, no clubbing and no edema  Neurologic: Unable to assess        Recent Labs     02/10/24  0227 02/11/24  1240    138   K 4.7 4.2   CL 91* 97*   CO2 33* 30*   BUN 28* 33*   CREATININE 3.5* 4.6*   GLUCOSE 161* 150*   CALCIUM 9.0 8.7       Recent Labs     02/10/24  0227 02/11/24  1240   WBC 20.6* 8.5   RBC 3.34* 3.22*   HGB 10.3* 9.8*   HCT 34.3 32.5*   .7* 100.9*   MCH 30.8 30.4   MCHC 30.0* 30.2*   RDW 15.6* 15.5*    204   MPV 10.3 10.4         Assessment and Plan:     Principal Problem:    Acute respiratory failure with hypoxia (HCC)  Active Problems:    Pleural effusion on left  Resolved Problems:    * No resolved hospital problems. *    Acute hypoxic respiratory failure secondary to large left pleural effusion - pulmonary consulted S/P left pigtail catheter per ED - 1,700 cc of serosanguinous fluid, fluid cytology and cultures pending. Procal 2.1. Concern for malignancy/less likely empyema. Patient is currently on 2L NC.  Continue weaning as tolerated  Sepsis/  SIRS secondary to sacral and breast wounds -  hypoxia, leukocytosis, tachycardia, hypotension.  ID following, wounds do not look infected, antibiotics discontinued.  MRSE bacteremia.  4/4 blood cultures positive for methicillin-resistant staph epi.  Positive MRSA nares.  Bactroban.  Appreciate ID input  Leukocytosis secondary to above - WBC 20.6, monitor labs and cultures.  Resolved  ESRD on HD - [facility]. Nephrology consulted for HD recommendations.   HFpEF - EF 60% on echo 2022. BNP 47209. Monitor intake and output.   Chronic hypotension - Given midodrine and 500 mL bolus in ER. Continue midodrine. Monitor BP and adjust accordingly. BB with holding parameters.  Acute metabolic alkalosis .   Anemia of CKD -  Continue ARUN . Monitor and transfuse for Hgb < 7.   Calciphylaxis with multiple wounds including left breast open wound - has

## 2024-02-12 NOTE — PROGRESS NOTES
SPIRITUAL HEALTH SERVICES - [facility] Encounter    Name: Marisela Mcguire                  Referral: Routine Visit    Sacraments  Anointed (Last Rites): Yes  Apostolic [place]: No  Confession: No  Communion: Yes     Assessment:  Patient receptive to  visit.      Intervention:   provided spiritual support and sacramental ministry for patient.     Outcome:  Patient expressed gratitude for visit.    Plan:  Chaplains will remain available to offer spiritual and emotional support as needed.      Electronically signed by Chaplain Mau, on 2/12/2024 at 4:37 PM.  Spiritual Care Department  [facility]  [phone]

## 2024-02-12 NOTE — DISCHARGE INSTR - COC
Continuity of Care Form    Patient Name: Marisela Mcguire   :  1950  MRN:  [id number]    Admit date:  2/10/2024  Discharge date:  02/15/24    Code Status Order: Limited   Advance Directives:     Admitting Physician:  José Miguel Avery MD  PCP: Rj Casillas MD    Discharging Nurse: Shawna Stroud RN  Discharging Hospital Unit/Room#: 0411/0411-A  Discharging Unit Phone Number: [phone]    Emergency Contact:   Extended Emergency Contact Information  Primary Emergency Contact: MaynorInga  Address: [address] [place] [address] [facility]  Home Phone: [phone]  Mobile Phone: [phone]  Relation: Child   needed? No  Secondary Emergency Contact: Johnson Mcguire  Address: [address]           [place] [address] [facility]  Home Phone: [phone]  Mobile Phone: [phone]  Relation: Child   needed? No    Past Surgical History:  Past Surgical History:   Procedure Laterality Date    ABDOMEN SURGERY N/A 2020    SACRAL WOUND DEBRIDEMENT CALL  WITH TIME AVAIL AM performed by Jared Muñoz MD at [facility] OR     SECTION  x3    CHOLECYSTECTOMY  3/31/16    Laparoscopic-Dr. Muñoz    CYSTOSCOPY       for kidney stones    DIALYSIS FISTULA CREATION Left 2021    INSERTION FISTULA LEFT ARM performed by Lam Nichols MD at [facility] OR    DIALYSIS FISTULA CREATION Right 2021    REVISION AV FISTULA LEFT ARM performed by Lam Nichols MD at [facility] OR    FEMUR FRACTURE SURGERY Right 2022    RIGHT DISTAL FEMUR OPEN REDUCTION INTERNAL FIXATION ++SYNTHES++ performed by Corona Orta MD at [facility] OR    FOOT SURGERY       right     UPPER GASTROINTESTINAL ENDOSCOPY  2.1815    Dr. Ayoub, [facility] Findings: Mild Gastrits and Duodenitis, 2cm Hiatal Hernia    UPPER GASTROINTESTINAL ENDOSCOPY N/A 2020    EGD CONTROL HEMORRHAGE performed by Jared Muñoz MD at [facility] OR    UPPER GASTROINTESTINAL ENDOSCOPY N/A 2020    EGD  Volume (cm^3) 1.632 cm^3 02/10/24 2015   Wound Healing % -716 02/10/24 2015   Wound Assessment Slough;[place]/red 02/12/24 0933   Drainage Amount Scant (moist but unmeasurable) 02/12/24 0933   Drainage Description Serosanguinous 02/12/24 0933   Carmen-wound Assessment Blanchable erythema 02/10/24 2015   Number of days: 39       Wound 01/05/24 Left;Lateral breast/axillary (Active)   Dressing Status Other (Comment) 02/11/24 0915   Number of days: 38       Wound 01/05/24 Left flank (Active)   Dressing Status New dressing applied 02/12/24 0933   Wound Cleansed Cleansed with saline 02/12/24 0933   Dressing/Treatment Foam 02/12/24 0933   Dressing Change Due 02/13/24 02/12/24 0933   Wound Length (cm) 1 cm 02/10/24 2015   Wound Width (cm) 2 cm 02/10/24 2015   Wound Depth (cm) 0.2 cm 02/10/24 2015   Wound Surface Area (cm^2) 2 cm^2 02/10/24 2015   Change in Wound Size % (l*w) 42.86 02/10/24 2015   Wound Volume (cm^3) 0.4 cm^3 02/10/24 2015   Wound Healing % -14 02/10/24 2015   Wound Assessment Slough;[place]/red 02/12/24 0933   Drainage Amount Small (< 25%) 02/12/24 0933   Drainage Description Serosanguinous 02/12/24 0933   Carmen-wound Assessment Blanchable erythema 02/12/24 0933   Number of days: 38       Wound 01/05/24 Thigh Posterior;Right (Active)   Dressing Status New dressing applied 02/12/24 0933   Wound Cleansed Cleansed with saline 02/12/24 0933   Dressing/Treatment Foam 02/12/24 0933   Dressing Change Due 02/13/24 02/12/24 0933   Wound Length (cm) 1 cm 02/10/24 2015   Wound Width (cm) 2 cm 02/10/24 2015   Wound Depth (cm) 0.2 cm 02/10/24 2015   Wound Surface Area (cm^2) 2 cm^2 02/10/24 2015   Change in Wound Size % (l*w) 92 02/10/24 2015   Wound Volume (cm^3) 0.4 cm^3 02/10/24 2015   Wound Healing % 84 02/10/24 2015   Wound Assessment Pink/red;Eschar moist;Slough 02/12/24 0933   Drainage Amount Small (< 25%) 02/12/24 0933   Drainage Description Serosanguinous 02/12/24 0933   Number of days: 38       Wound 01/15/24 Hip Left

## 2024-02-12 NOTE — PROGRESS NOTES
and/or weight based adjustment of  the mA/kV was utilized to reduce the radiation dose to as low as reasonably  achievable.    COMPARISON:  02/09/2023    HISTORY:  ORDERING SYSTEM PROVIDED HISTORY: white out left lung on CXR  TECHNOLOGIST PROVIDED HISTORY:  Reason for exam:->white out left lung on CXR  Decision Support Exception - unselect if not a suspected or confirmed  emergency medical condition->Emergency Medical Condition (MA)    FINDINGS:  Mediastinum: The heart is normal in size without pericardial effusion.  There  is dense annular calcification of the mitral valve.  There are calcified  mediastinal lymph nodes.    Lungs/pleura: Large left pleural effusion with near complete left lung  atelectasis.  There are patchy ground-glass opacities within the right lung.  No pneumothorax.    Upper Abdomen:  Limited images of the upper abdomen demonstrate no acute  abnormality.    Soft Tissues/Bones:  No acute abnormality of the visualized osseous  structures.     Impression:       1. Large left pleural effusion with near complete left lung atelectasis.  2. Patchy ground-glass opacities right lung may be infectious or inflammatory  in etiology.     XR CHEST PORTABLE [[id number]] Collected: 02/10/24 0305     Order Status: Completed Updated: 02/10/24 0311     Narrative:       EXAMINATION:  ONE XRAY VIEW OF THE CHEST    2/10/2024 2:17 am    COMPARISON:  01/14/2024    HISTORY:  ORDERING SYSTEM PROVIDED HISTORY: shortness of breath  TECHNOLOGIST PROVIDED HISTORY:  Reason for exam:->shortness of breath    FINDINGS:  There is near complete left lung white out.  The left heart border is  obscured.  Mild rightward mediastinal shift, although this may be accentuated  due to patient positioning.  The right lung is clear.  No pneumothorax.     Impression:       Near complete left lung white out with mild rightward mediastinal shift. This  may be due to a combination of pleural effusion, atelectasis, and/or  pneumonia.        2/08/2022 ECHO Summary   Technically limited study with off axis images.   Left ventricular internal dimensions were normal in diastole and systole.   Abnormal (paradoxical) motion consistent with conduction abnormality.   No regional wall motion abnormalities seen.   The left atrium is moderately dilated.   Focal calcification mitral valve leaflets and subvalvular structures.   Severe mitral annular calcification.   Mild mitral regurgitation is present.   Moderate functional mitral stenosis .   The aortic valve appears mildly sclerotic.       Objective:     Vitals: /85   Pulse 95   Temp 97.4 °F (36.3 °C)   Resp 18   Ht 1.549 m (5' 1\")   Wt 73.1 kg (161 lb 2.5 oz)   SpO2 100%   BMI 30.45 kg/m²   General appearance: lying in bed, crying out in pain, not in any distress. Is a little altered with regard to mentation.  Lungs: diminished air movement.  Left chest tube.  Heart: R/R/R, no M/R/G  Abdomen: soft, non tender, non distended.  Extremities: No edema    Assessment & Plan:     End-stage kidney disease:  Continue MWF HD.  Access via L AVF  Adjust prescription accordingly  Electrolyte/Acid Base Disorders  Mild alkalosis noted improving  No need to supplement bicarb  Other lytes satisfactory  BP/volume status --> HTN in ESRD/CKD5  BP 110s-120s/60s and stable  Euvolemic  On toprol XL 25 mg daily  Also on midodrine, can continue  CKD MBD  Ca 8.5, phos 5.6, Mg 2.0  Stared on binder on phoslo 667 mg TID  Follow labs  Anemia  At least partly of CKD.   On ARUN. Hb today 9.0 g/dL  Nutrition  Albumin 2.3  Encourage more nutritional supplementation, greater protein intake      Lupillo Astudillo MD      Electronically signed by Lupillo Astudillo MD on 2/12/2024 at 2:57 PM

## 2024-02-12 NOTE — ACP (ADVANCE CARE PLANNING)
Advance Care Planning   Healthcare Decision Maker:    Primary Decision Maker: MaynorInga - Child - [phone]    Primary Decision Maker: Johnson Mcguire - Child - [phone]    Click here to complete Healthcare Decision Makers including selection of the Healthcare Decision Maker Relationship (ie \"Primary\").

## 2024-02-12 NOTE — PROGRESS NOTES
GENERAL SURGERY  DAILY PROGRESS NOTE  2/12/2024  Chief Complaint   Patient presents with    Shortness of Breath     SOB, hypoxic on roomair (baseline)         Subjective:  No overnight issues.  C/o pain in left breast    Objective:  BP (!) 127/48   Pulse 89   Temp 98.1 °F (36.7 °C) (Oral)   Resp 18   Ht 1.549 m (5' 1\")   Wt 73.2 kg (161 lb 4.8 oz)   SpO2 99%   BMI 30.48 kg/m²     GENERAL:  Laying in bed, awake, alert, cooperative, no apparent distress  HEAD: Normocephalic, atraumatic  EYES: No sclera icterus, pupils equal  LUNGS:  No increased work of breathing.  Left chest tube in place with serous output.  CARDIOVASCULAR:  regular rate  BREAST:  L breast with wound in inferior portion with fibrinous exudate, no surrounding erythema.  Wound is tender.  ABDOMEN:  Soft, non-tender, non-distended  EXTREMITIES: No edema or swelling  SKIN: Warm and dry, no rashes or lesions    Assessment/Plan:  73 y.o. female  with sacral decubitus ulcer and left breast wound.     Local wound care to sacral ulcer - clean does not need debridement.  L breast wound would benefit from debridement if able to obtain consent - otherwise continue local wound care.      Electronically signed by Mandy Liriano DO on 2/12/2024 at 10:56 AM    Attending Note:        Patient seen/examined 2/12/2024. I performed the key aspects of the physical exam.  I have independently reviewed the record including all pertinent and new radiology images and laboratory findings as well as reviewed all pertinent provider documentation.  I discussed the case with the resident and I agree with the assessment and plan.     Kirstin Carrillo MD

## 2024-02-12 NOTE — PROGRESS NOTES
Consulted for new piv as RAC line beeping.  Attempt x 1 at basilic vein in upper arm, needle did not penetrate very deep vein and would only bounce off or go to either side. Pt did not have any other veins to attempt a different location for IV site. Current IV site good, we can try again if that site goes bad.  Spoke with Maribel COLLADO.

## 2024-02-12 NOTE — PROGRESS NOTES
Current Iv site now will not flush. New consult to IV team. Spoke with them earlier when they attempted to get another site and couldn't. They may try again later because now currently does not have IV access.

## 2024-02-13 ENCOUNTER — APPOINTMENT (OUTPATIENT)
Dept: GENERAL RADIOLOGY | Age: 74
End: 2024-02-13
Payer: MEDICARE

## 2024-02-13 LAB
ANION GAP SERPL CALCULATED.3IONS-SCNC: 7 MMOL/L (ref 7–16)
BUN SERPL-MCNC: 15 MG/DL (ref 6–23)
CALCIUM SERPL-MCNC: 8.9 MG/DL (ref 8.6–10.2)
CHLORIDE SERPL-SCNC: 104 MMOL/L (ref 98–107)
CO2 SERPL-SCNC: 28 MMOL/L (ref 22–29)
CREAT SERPL-MCNC: 2.9 MG/DL (ref 0.5–1)
ERYTHROCYTE [DISTWIDTH] IN BLOOD BY AUTOMATED COUNT: 15.8 % (ref 11.5–15)
GFR SERPL CREATININE-BSD FRML MDRD: 17 ML/MIN/1.73M2
GLUCOSE BLD-MCNC: 110 MG/DL (ref 74–99)
GLUCOSE BLD-MCNC: 264 MG/DL (ref 74–99)
GLUCOSE BLD-MCNC: 97 MG/DL (ref 74–99)
GLUCOSE BLD-MCNC: 99 MG/DL (ref 74–99)
GLUCOSE SERPL-MCNC: 131 MG/DL (ref 74–99)
HCT VFR BLD AUTO: 32.4 % (ref 34–48)
HGB BLD-MCNC: 9.8 G/DL (ref 11.5–15.5)
MCH RBC QN AUTO: 30.9 PG (ref 26–35)
MCHC RBC AUTO-ENTMCNC: 30.2 G/DL (ref 32–34.5)
MCV RBC AUTO: 102.2 FL (ref 80–99.9)
PLATELET # BLD AUTO: 223 K/UL (ref 130–450)
PMV BLD AUTO: 10 FL (ref 7–12)
POTASSIUM SERPL-SCNC: 3.9 MMOL/L (ref 3.5–5)
RBC # BLD AUTO: 3.17 M/UL (ref 3.5–5.5)
SODIUM SERPL-SCNC: 139 MMOL/L (ref 132–146)
WBC OTHER # BLD: 7.3 K/UL (ref 4.5–11.5)

## 2024-02-13 PROCEDURE — 2060000000 HC ICU INTERMEDIATE R&B

## 2024-02-13 PROCEDURE — 2500000003 HC RX 250 WO HCPCS: Performed by: INTERNAL MEDICINE

## 2024-02-13 PROCEDURE — 6360000002 HC RX W HCPCS

## 2024-02-13 PROCEDURE — 82962 GLUCOSE BLOOD TEST: CPT

## 2024-02-13 PROCEDURE — 2700000000 HC OXYGEN THERAPY PER DAY

## 2024-02-13 PROCEDURE — 94660 CPAP INITIATION&MGMT: CPT

## 2024-02-13 PROCEDURE — 6370000000 HC RX 637 (ALT 250 FOR IP): Performed by: SURGERY

## 2024-02-13 PROCEDURE — 6360000002 HC RX W HCPCS: Performed by: STUDENT IN AN ORGANIZED HEALTH CARE EDUCATION/TRAINING PROGRAM

## 2024-02-13 PROCEDURE — 99232 SBSQ HOSP IP/OBS MODERATE 35: CPT | Performed by: INTERNAL MEDICINE

## 2024-02-13 PROCEDURE — 36415 COLL VENOUS BLD VENIPUNCTURE: CPT

## 2024-02-13 PROCEDURE — 6360000002 HC RX W HCPCS: Performed by: NURSE PRACTITIONER

## 2024-02-13 PROCEDURE — 94640 AIRWAY INHALATION TREATMENT: CPT

## 2024-02-13 PROCEDURE — 85027 COMPLETE CBC AUTOMATED: CPT

## 2024-02-13 PROCEDURE — 6370000000 HC RX 637 (ALT 250 FOR IP): Performed by: CLINICAL NURSE SPECIALIST

## 2024-02-13 PROCEDURE — 6370000000 HC RX 637 (ALT 250 FOR IP): Performed by: STUDENT IN AN ORGANIZED HEALTH CARE EDUCATION/TRAINING PROGRAM

## 2024-02-13 PROCEDURE — 6370000000 HC RX 637 (ALT 250 FOR IP): Performed by: NURSE PRACTITIONER

## 2024-02-13 PROCEDURE — 6360000002 HC RX W HCPCS: Performed by: CLINICAL NURSE SPECIALIST

## 2024-02-13 PROCEDURE — 80048 BASIC METABOLIC PNL TOTAL CA: CPT

## 2024-02-13 PROCEDURE — 99231 SBSQ HOSP IP/OBS SF/LOW 25: CPT | Performed by: NURSE PRACTITIONER

## 2024-02-13 PROCEDURE — 71045 X-RAY EXAM CHEST 1 VIEW: CPT

## 2024-02-13 RX ADMIN — MUPIROCIN: 20 OINTMENT TOPICAL at 21:24

## 2024-02-13 RX ADMIN — MUPIROCIN: 20 OINTMENT TOPICAL at 09:33

## 2024-02-13 RX ADMIN — HYDROMORPHONE HYDROCHLORIDE 0.5 MG: 1 INJECTION, SOLUTION INTRAMUSCULAR; INTRAVENOUS; SUBCUTANEOUS at 18:44

## 2024-02-13 RX ADMIN — HEPARIN SODIUM 5000 UNITS: 10000 INJECTION INTRAVENOUS; SUBCUTANEOUS at 05:11

## 2024-02-13 RX ADMIN — SENNOSIDES 17.2 MG: 8.6 TABLET, COATED ORAL at 21:24

## 2024-02-13 RX ADMIN — GUAIFENESIN 400 MG: 400 TABLET ORAL at 09:30

## 2024-02-13 RX ADMIN — OXYCODONE 5 MG: 5 TABLET ORAL at 10:49

## 2024-02-13 RX ADMIN — SENNOSIDES 17.2 MG: 8.6 TABLET, COATED ORAL at 09:31

## 2024-02-13 RX ADMIN — MIDODRINE HYDROCHLORIDE 15 MG: 5 TABLET ORAL at 09:30

## 2024-02-13 RX ADMIN — CALCIUM ACETATE 667 MG: 667 CAPSULE ORAL at 09:31

## 2024-02-13 RX ADMIN — COLLAGENASE SANTYL: 250 OINTMENT TOPICAL at 09:34

## 2024-02-13 RX ADMIN — ALBUTEROL SULFATE 2.5 MG: 2.5 SOLUTION RESPIRATORY (INHALATION) at 09:06

## 2024-02-13 RX ADMIN — HEPARIN SODIUM 5000 UNITS: 10000 INJECTION INTRAVENOUS; SUBCUTANEOUS at 21:24

## 2024-02-13 RX ADMIN — ALBUTEROL SULFATE 2.5 MG: 2.5 SOLUTION RESPIRATORY (INHALATION) at 20:12

## 2024-02-13 RX ADMIN — METOPROLOL SUCCINATE 25 MG: 25 TABLET, EXTENDED RELEASE ORAL at 09:31

## 2024-02-13 RX ADMIN — HEPARIN SODIUM 5000 UNITS: 10000 INJECTION INTRAVENOUS; SUBCUTANEOUS at 14:28

## 2024-02-13 RX ADMIN — ALBUTEROL SULFATE 2.5 MG: 2.5 SOLUTION RESPIRATORY (INHALATION) at 15:35

## 2024-02-13 RX ADMIN — PROCHLORPERAZINE EDISYLATE 10 MG: 5 INJECTION INTRAMUSCULAR; INTRAVENOUS at 05:06

## 2024-02-13 ASSESSMENT — PAIN SCALES - GENERAL
PAINLEVEL_OUTOF10: 10
PAINLEVEL_OUTOF10: 0
PAINLEVEL_OUTOF10: 8
PAINLEVEL_OUTOF10: 0
PAINLEVEL_OUTOF10: 0

## 2024-02-13 ASSESSMENT — PAIN DESCRIPTION - DESCRIPTORS
DESCRIPTORS: ACHING;DISCOMFORT
DESCRIPTORS: ACHING;DISCOMFORT;SHARP

## 2024-02-13 ASSESSMENT — PAIN - FUNCTIONAL ASSESSMENT: PAIN_FUNCTIONAL_ASSESSMENT: PREVENTS OR INTERFERES SOME ACTIVE ACTIVITIES AND ADLS

## 2024-02-13 ASSESSMENT — PAIN DESCRIPTION - FREQUENCY: FREQUENCY: INTERMITTENT

## 2024-02-13 ASSESSMENT — PAIN DESCRIPTION - LOCATION
LOCATION: ABDOMEN
LOCATION: COCCYX

## 2024-02-13 ASSESSMENT — PAIN DESCRIPTION - ORIENTATION
ORIENTATION: RIGHT;LEFT
ORIENTATION: LOWER;MID

## 2024-02-13 ASSESSMENT — PAIN DESCRIPTION - ONSET: ONSET: ON-GOING

## 2024-02-13 ASSESSMENT — PAIN DESCRIPTION - PAIN TYPE: TYPE: CHRONIC PAIN

## 2024-02-13 NOTE — PROGRESS NOTES
[facility]  [facility]  Progress Note    SUBJECTIVE:  Chief Complaint   Patient presents with    Shortness of Breath     SOB, hypoxic on roomair (baseline)     No new problems reported.  No fever.    Review of systems:  As stated above in the chief complaint, otherwise negative.    Medications:  Scheduled Meds:   collagenase   Topical Daily    mupirocin   Each Nostril BID    calcium acetate  1 capsule Oral TID WC    epoetin derek-epbx  6,000 Units SubCUTAneous Once per day on     insulin lispro  0-4 Units SubCUTAneous TID WC    insulin lispro  0-4 Units SubCUTAneous Nightly    metoprolol succinate  25 mg Oral Daily    midodrine  15 mg Oral Once per day on     senna  2 tablet Oral BID    heparin (porcine)  5,000 Units SubCUTAneous 3 times per day    albuterol  2.5 mg Nebulization 4x Daily RT    guaiFENesin  400 mg Oral TID     Continuous Infusions:  PRN Meds:oxyCODONE, bisacodyl, acetaminophen, polyethylene glycol, white petrolatum, prochlorperazine    OBJECTIVE:  /64   Pulse 80   Temp 97.9 °F (36.6 °C) (Axillary)   Resp 20   Ht 1.549 m (5' 1\")   Wt 69 kg (152 lb 1.9 oz)   SpO2 100%   BMI 28.74 kg/m²   Temp  Av.7 °F (36.5 °C)  Min: 97.4 °F (36.3 °C)  Max: 98 °F (36.7 °C)  Constitutional: The patient is in bed.  She is asleep.  No distress.  Skin: Warm and dry.  Left breast wound covered.  HEENT: Round and reactive pupils.  Moist mucous membranes.  No ulcerations or thrush.  Neck: Supple to movements.   Chest: No respiratory distress.  No crackles.  Small bore chest tube with serous fluid.  Cardiovascular: S1 and S2 are rhythmic and regular. No murmurs appreciated.   Abdomen: Positive bowel sounds to auscultation. Benign to palpation.  Back: Stage IV sacral decubitus and buttock wounds covered with dressings.  Extremities: Minimal edema.  Lines: Peripheral.    Laboratory and Tests:  Lab Results   Component Value Date    .0 (H) 2023

## 2024-02-13 NOTE — PROGRESS NOTES
[facility] Hospitalist Progress Note    Admitting Date and Time: 2/10/2024  2:16 AM  Admit Dx: Pleural effusion on left [J90]  Acute respiratory failure with hypoxia (HCC) [J96.01]    Subjective:  Patient is being followed for Pleural effusion on left [J90]  Acute respiratory failure with hypoxia (HCC) [J96.01]   Pt seen and examined this morning  No acute events overnight  Lying in bed, looks a little more comfortable today  She just had her dressings changed so she's complaining of pain    ROS: denies fever, chills, cp, sob, n/v, HA unless stated above.      collagenase   Topical Daily    mupirocin   Each Nostril BID    calcium acetate  1 capsule Oral TID WC    epoetin derek-epbx  6,000 Units SubCUTAneous Once per day on Mon Wed Fri    insulin lispro  0-4 Units SubCUTAneous TID WC    insulin lispro  0-4 Units SubCUTAneous Nightly    metoprolol succinate  25 mg Oral Daily    midodrine  15 mg Oral Once per day on Mon Tue Thu Fri    senna  2 tablet Oral BID    heparin (porcine)  5,000 Units SubCUTAneous 3 times per day    albuterol  2.5 mg Nebulization 4x Daily RT    guaiFENesin  400 mg Oral TID     oxyCODONE, 5 mg, Q4H PRN  bisacodyl, 10 mg, Daily PRN  acetaminophen, 1,000 mg, Q8H PRN  polyethylene glycol, 17 g, Daily PRN  white petrolatum, , BID PRN  prochlorperazine, 10 mg, Q6H PRN         Objective:    /78   Pulse (!) 109   Temp 97.7 °F (36.5 °C) (Axillary)   Resp 20   Ht 1.549 m (5' 1\")   Wt 69 kg (152 lb 1.9 oz)   SpO2 98%   BMI 28.74 kg/m²   \  General Appearance: Alert and awake, in mild distress  Skin: warm and dry  Head: normocephalic and atraumatic  Eyes: pupils equal, round, and reactive to light, extraocular eye movements intact, conjunctivae normal  Neck: neck supple and non tender without mass   Pulmonary/Chest: Coarse BS bilaterally, no respiratory distress  Cardiovascular: normal rate, normal S1 and S2 and no carotid bruits  Abdomen: soft, non-tender, non-distended, normal bowel

## 2024-02-13 NOTE — PROGRESS NOTES
Palliative Care Department  [phone]  Palliative Care Progress Note  Provider Antonella Kebede, APRN - CNP     Marisela Mcguire  [id number]  Hospital Day: 4  Date of Initial Consult: 2/12/1024  Referring Provider: Chloe Araujo MD   Palliative Medicine was consulted for assistance with: Goals of care, overwhelming symptoms    HPI:   Marisela Mcguire is a 73 y.o. with a medical history of anemia, anxiety, depression, arthritis, chronic pain syndrome, COVID-19 infection, diabetes mellitus, dysphagia, ESRD on hemodialysis, GERD, hypertension, pressure ulcer of the sacral region, who was admitted on 2/10/2024 from nursing facility with a CHIEF COMPLAINT of respiratory distress, shortness of breath, chest pain. She is on room air at baseline and per EMS was hypoxic 70% on room air. They placed patient on 5 L via NC which increased saturation to high 80s so they put her on cpap. Patient Is tachycardic and hypotensive on arrival. Labs reveal Cl 91, CO2 33, Creatinine 3.5, GFR 13, Glucose 161, BNP 78319, Troponin 100, WBC 20.6, Hgb 10.3. COVID and flu negative. ABG show pO2 217.4, HCO3 29.1. CXR with near complete left lung white out with mild right upward mediastinal shift. CT chest showing large left pleural effusion with near complete left lung atelectasis. Patchy ground-glass opacities in right lung. Pulmonary consulted.  Left pigtail catheter placed.  Surgery, pulmonology, ID, nephrology, oncology following.  Palliative medicine consulted for further assistance.    ASSESSMENT/PLAN:     Pertinent Hospital Diagnoses     Acute hypoxic respiratory failure secondary to large left pleural effusion  Sepsis  Sacral and breast wounds  Leukocytosis  ESRD on HD  HFpEF  Anemia      Palliative Care Encounter / Counseling Regarding Goals of Care  Please see detailed goals of care discussion as below  At this time, Marisela Mcguire, Does Not have capacity for medical decision-making.  Capacity is time limited and situation/question

## 2024-02-13 NOTE — PROGRESS NOTES
[facility]  Nephrology Progress Note  2/13/2024 1:49 PM  Subjective:     Admit Date: 2/10/2024    PCP: Rj Casillas MD    Summary Statement: This is a 73-year-old female with past medical history of end-stage renal disease on hemodialysis known to us as she dialyzes with our practice.  She was hospitalized with worsening shortness of breath.      Interval History:     2/13: Seen and examined. Resting in bed. D/w daughter at bedside. Patient has not been doing well, has voiced desire to die during admission. Was in considerable pain/agitation during HD yesterday.       Diet: ADULT DIET; Regular    Data:     Scheduled Meds:   collagenase   Topical Daily    mupirocin   Each Nostril BID    calcium acetate  1 capsule Oral TID WC    epoetin derek-epbx  6,000 Units SubCUTAneous Once per day on Mon Wed Fri    insulin lispro  0-4 Units SubCUTAneous TID WC    insulin lispro  0-4 Units SubCUTAneous Nightly    metoprolol succinate  25 mg Oral Daily    midodrine  15 mg Oral Once per day on Mon Tue Thu Fri    senna  2 tablet Oral BID    heparin (porcine)  5,000 Units SubCUTAneous 3 times per day    albuterol  2.5 mg Nebulization 4x Daily RT    guaiFENesin  400 mg Oral TID     Continuous Infusions:      PRN Meds:HYDROmorphone, oxyCODONE, bisacodyl, acetaminophen, polyethylene glycol, white petrolatum, prochlorperazine  I/O last 3 completed shifts:  In: 300   Out: 1880 [Chest Tube:80]  No intake/output data recorded.    Intake/Output Summary (Last 24 hours) at 2/13/2024 1349  Last data filed at 2/13/2024 0415  Gross per 24 hour   Intake 300 ml   Output 1840 ml   Net -1540 ml     CBC:   Recent Labs     02/11/24  1240 02/12/24  1310   WBC 8.5 9.7   HGB 9.8* 9.0*    238     BMP:    Recent Labs     02/11/24  1240 02/12/24  1310    137   K 4.2 4.5   CL 97* 100   CO2 30* 27   BUN 33* 40*   CREATININE 4.6* 5.0*   GLUCOSE 150* 149*     Hepatic:   Recent Labs     02/11/24  1240 02/12/24  1310   AST 13 9   ALT 7 <5  confirmed  emergency medical condition->Emergency Medical Condition (MA)    FINDINGS:  Mediastinum: The heart is normal in size without pericardial effusion.  There  is dense annular calcification of the mitral valve.  There are calcified  mediastinal lymph nodes.    Lungs/pleura: Large left pleural effusion with near complete left lung  atelectasis.  There are patchy ground-glass opacities within the right lung.  No pneumothorax.    Upper Abdomen:  Limited images of the upper abdomen demonstrate no acute  abnormality.    Soft Tissues/Bones:  No acute abnormality of the visualized osseous  structures.     Impression:       1. Large left pleural effusion with near complete left lung atelectasis.  2. Patchy ground-glass opacities right lung may be infectious or inflammatory  in etiology.     XR CHEST PORTABLE [[id number]] Collected: 02/10/24 0305     Order Status: Completed Updated: 02/10/24 0311     Narrative:       EXAMINATION:  ONE XRAY VIEW OF THE CHEST    2/10/2024 2:17 am    COMPARISON:  01/14/2024    HISTORY:  ORDERING SYSTEM PROVIDED HISTORY: shortness of breath  TECHNOLOGIST PROVIDED HISTORY:  Reason for exam:->shortness of breath    FINDINGS:  There is near complete left lung white out.  The left heart border is  obscured.  Mild rightward mediastinal shift, although this may be accentuated  due to patient positioning.  The right lung is clear.  No pneumothorax.     Impression:       Near complete left lung white out with mild rightward mediastinal shift. This  may be due to a combination of pleural effusion, atelectasis, and/or  pneumonia.       2/08/2022 ECHO Summary   Technically limited study with off axis images.   Left ventricular internal dimensions were normal in diastole and systole.   Abnormal (paradoxical) motion consistent with conduction abnormality.   No regional wall motion abnormalities seen.   The left atrium is moderately dilated.   Focal calcification mitral valve leaflets and subvalvular

## 2024-02-13 NOTE — PROGRESS NOTES
HOSPICE [facility]    Consult received. Chart reviewed. Visited patient at bedside. She is sleeping, has a chest tube and on oxygen. Occasionally moans out in pain but is resting comfortably. Spoke with daughter Inga at bedside.     The hospice benefit and philosophy were explained including that hospice is end of life care in which, per Medicare, a patient has a terminal diagnosis that life expectancy would be 6 months or less. Explained that once in hospice care, all aggressive treatments would be stopped and allow nature to takes its course with focus on comfort care for the patient.  Explained hospice services at home, at [facility] with room/board private pay unless patient has Medicaid and the [facility] for short-term symptom management and respite.    Emotional support given. Inga would like to discuss options with her other siblings before making any final decisions. She will either call me today or HOTV will visit family at bedside in morning. She believes plan will be home.     KAREN, charge nurse and bedside nurse updated. Thank you for the consult. [facility].     Electronically signed by Henny Schultz RN on 2/13/2024 at 12:29 PM  [phone]; [phone]

## 2024-02-13 NOTE — PROGRESS NOTES
Pulmonary Progress Note    Admit Date: 2/10/2024                            PCP: Rj Casillas MD  Principal Problem:    Acute respiratory failure with hypoxia (HCC)  Active Problems:    Pleural effusion on left  Resolved Problems:    * No resolved hospital problems. *      Subjective:  Resting in bed on 2L, pox 98%      Medications:       collagenase   Topical Daily    mupirocin   Each Nostril BID    calcium acetate  1 capsule Oral TID WC    epoetin derek-epbx  6,000 Units SubCUTAneous Once per day on     insulin lispro  0-4 Units SubCUTAneous TID WC    insulin lispro  0-4 Units SubCUTAneous Nightly    metoprolol succinate  25 mg Oral Daily    midodrine  15 mg Oral Once per day on     senna  2 tablet Oral BID    heparin (porcine)  5,000 Units SubCUTAneous 3 times per day    albuterol  2.5 mg Nebulization 4x Daily RT    guaiFENesin  400 mg Oral TID       Vitals:  VITALS:  /78   Pulse (!) 109   Temp 97.7 °F (36.5 °C) (Axillary)   Resp 20   Ht 1.549 m (5' 1\")   Wt 69 kg (152 lb 1.9 oz)   SpO2 98%   BMI 28.74 kg/m²   24HR INTAKE/OUTPUT:    Intake/Output Summary (Last 24 hours) at 2024 1418  Last data filed at 2024 0415  Gross per 24 hour   Intake 300 ml   Output 1840 ml   Net -1540 ml     CURRENT PULSE OXIMETRY:  SpO2: 98 %  24HR PULSE OXIMETRY RANGE:  SpO2  Av.2 %  Min: 93 %  Max: 100 %  CVP:    VENT SETTINGS:      Additional Respiratory Assessments  Pulse: (!) 109  Respirations: 20  SpO2: 98 %      EXAM:  General: Alert, in NAD  ENT: No discharge. Pharynx clear. membranes moist   Neck: Supple, Trachea midline.  Resp: No accessory muscle use. Non-labored.  Lungs diminished > left  Left side Chest tube , no leak   CV: Regular rate. Regular rhythm. No murmur . No edema.   ABD: Non-tender. Non-distended. Soft, round . Normal bowel sounds.   Skin: Warm and dry.   M/S: No cyanosis. No joint deformity. No clubbing.   Neuro: Awake. Follows commands. O x 1-2, PRADO  Psych:  emergency medical condition->Emergency Medical Condition (MA) FINDINGS: Mediastinum: The heart is normal in size without pericardial effusion.  There is dense annular calcification of the mitral valve.  There are calcified mediastinal lymph nodes. Lungs/pleura: Large left pleural effusion with near complete left lung atelectasis.  There are patchy ground-glass opacities within the right lung. No pneumothorax. Upper Abdomen:  Limited images of the upper abdomen demonstrate no acute abnormality. Soft Tissues/Bones:  No acute abnormality of the visualized osseous structures.     1. Large left pleural effusion with near complete left lung atelectasis. 2. Patchy ground-glass opacities right lung may be infectious or inflammatory in etiology.     XR CHEST PORTABLE    Result Date: 2/10/2024  EXAMINATION: ONE XRAY VIEW OF THE CHEST 2/10/2024 2:17 am COMPARISON: 01/14/2024 HISTORY: ORDERING SYSTEM PROVIDED HISTORY: shortness of breath TECHNOLOGIST PROVIDED HISTORY: Reason for exam:->shortness of breath FINDINGS: There is near complete left lung white out.  The left heart border is obscured.  Mild rightward mediastinal shift, although this may be accentuated due to patient positioning.  The right lung is clear.  No pneumothorax.     Near complete left lung white out with mild rightward mediastinal shift. This may be due to a combination of pleural effusion, atelectasis, and/or pneumonia.       73 YOF, from NH, with PMH ESRD on HD, HFpEF 60% (Echo 2022) with history of sacral ulcer and breast wound confused at baseline, difficulty breathing with hypoxia      2/10 POX 70% on RA required bipap 12/6  Chest film near complete whiteout left side   cxr improved  CT chest showing large effusion with complete atelectasis groundglass opacities right lung. Left CT inserted 1700c bloody fluid removed,  chest film significant improvement of left effusion after chest tube placement.  He had ultrafiltration  2/11: 3L pox 100%. L CT -80cc/24

## 2024-02-13 NOTE — PROGRESS NOTES
Physician Progress Note      PATIENT:               FLAKITA IYER  [facility] #:                  [id number]  :                       1950  ADMIT DATE:       2/10/2024 2:16 AM  DISCH DATE:  RESPONDING  PROVIDER #:        Chloe Araujo MD          QUERY TEXT:    Dear Attending Physician,    Patient admitted with Acute hypoxic respiratory failure secondary to large   left pleural effusion ... S/P left pigtail catheter per ED.  Noted   documentation of Sepsis in PCP notes. 2/10 WBC 20.6, Vitals 97.7 132 27 78/53   on admission. If possible, please document in progress notes and discharge   summary the source of Sepsis:    The medical record reflects the following:  Risk Factors: 2024.  Admitted to [facility] after a syncope with   dialysis.  She responded to fluid boluses but developed hypothermia.  She was   treated with Ceftriaxone.  Seen by ID for UTI.  She had decubitus ulcers on   buttocks and coccyx.  These were not infected.  No ongoing infection.  Clinical Indicators: Per PCP ,\"...Sepsis/  SIRS secondary to sacral and   breast wounds -  hypoxia, leukocytosis, tachycardia, hypotension.  ID   following, wounds do not look infected, antibiotics discontinued...Chronic   hypotension - Given midodrine and 500 mL bolus in ER. Continue midodrine ...\"   Per Pulmo ,\"... Large left hemothorax vs empyema...S/p left chest tube   2/10, continue to suction ...Culture prelim negative, cytology pending.  LD   842...On cefepime day 3  and Vanco discontinues , ID following ...Most likely   malignant, awaiting cytology ...Staff aureus bacterem  Treatment: IV ATB discontinued, IVF    Thank you,  Giuliana Bassett RN CCDS  Clinical Documentation Improvement Specialist  Phone# [phone]  Options provided:  -- Sepsis, present on admission, due to, Please document source.  -- Sepsis, present on admission, now resolved, due to, Please document source.  -- Sepsis ruled out after further study. Elevated WBC 20.6,  Vitals P 132 R 27   likely due to, Please document source.  -- Other - I will add my own diagnosis  -- Disagree - Not applicable / Not valid  -- Disagree - Clinically unable to determine / Unknown  -- Refer to Clinical Documentation Reviewer    PROVIDER RESPONSE TEXT:    Sepsis ruled out after further study. Elevated WBC 20.6, Vitals P 132 R 27   likely due to resp distress    Query created by: Giuliana Bassett on 2/13/2024 3:55 PM      Electronically signed by:  Chloe Araujo MD 2/13/2024 5:01 PM

## 2024-02-14 LAB
ACB COMPLEX DNA BLD POS QL NAA+NON-PROBE: NOT DETECTED
B FRAGILIS DNA BLD POS QL NAA+NON-PROBE: NOT DETECTED
BIOFIRE TEST COMMENT: ABNORMAL
C ALBICANS DNA BLD POS QL NAA+NON-PROBE: NOT DETECTED
C AURIS DNA BLD POS QL NAA+NON-PROBE: NOT DETECTED
C GATTII+NEOFOR DNA BLD POS QL NAA+N-PRB: NOT DETECTED
C GLABRATA DNA BLD POS QL NAA+NON-PROBE: NOT DETECTED
C KRUSEI DNA BLD POS QL NAA+NON-PROBE: NOT DETECTED
C PARAP DNA BLD POS QL NAA+NON-PROBE: NOT DETECTED
C TROPICLS DNA BLD POS QL NAA+NON-PROBE: NOT DETECTED
E CLOAC COMP DNA BLD POS NAA+NON-PROBE: NOT DETECTED
E COLI DNA BLD POS QL NAA+NON-PROBE: NOT DETECTED
E FAECALIS DNA BLD POS QL NAA+NON-PROBE: NOT DETECTED
E FAECIUM DNA BLD POS QL NAA+NON-PROBE: NOT DETECTED
ENTEROBACTERALES DNA BLD POS NAA+N-PRB: NOT DETECTED
GLUCOSE BLD-MCNC: 142 MG/DL (ref 74–99)
GLUCOSE BLD-MCNC: 148 MG/DL (ref 74–99)
GLUCOSE BLD-MCNC: 187 MG/DL (ref 74–99)
GP B STREP DNA BLD POS QL NAA+NON-PROBE: NOT DETECTED
HAEM INFLU DNA BLD POS QL NAA+NON-PROBE: NOT DETECTED
K OXYTOCA DNA BLD POS QL NAA+NON-PROBE: NOT DETECTED
KLEBSIELLA SP DNA BLD POS QL NAA+NON-PRB: NOT DETECTED
KLEBSIELLA SP DNA BLD POS QL NAA+NON-PRB: NOT DETECTED
L MONOCYTOG DNA BLD POS QL NAA+NON-PROBE: NOT DETECTED
MECA+MECC ISLT/SPM QL: DETECTED
MICROORGANISM SPEC CULT: ABNORMAL
MICROORGANISM SPEC CULT: NO GROWTH
MICROORGANISM/AGENT SPEC: ABNORMAL
MICROORGANISM/AGENT SPEC: ABNORMAL
MICROORGANISM/AGENT SPEC: NORMAL
N MEN DNA BLD POS QL NAA+NON-PROBE: NOT DETECTED
NON-GYN CYTOLOGY REPORT: NORMAL
P AERUGINOSA DNA BLD POS NAA+NON-PROBE: NOT DETECTED
PROTEUS SP DNA BLD POS QL NAA+NON-PROBE: NOT DETECTED
REPORT STATUS: NORMAL
S AUREUS DNA BLD POS QL NAA+NON-PROBE: NOT DETECTED
S AUREUS+CONS DNA BLD POS NAA+NON-PROBE: DETECTED
S EPIDERMIDIS DNA BLD POS QL NAA+NON-PRB: DETECTED
S LUGDUNENSIS DNA BLD POS QL NAA+NON-PRB: NOT DETECTED
S MALTOPHILIA DNA BLD POS QL NAA+NON-PRB: NOT DETECTED
S MARCESCENS DNA BLD POS NAA+NON-PROBE: NOT DETECTED
S PNEUM DNA BLD POS QL NAA+NON-PROBE: NOT DETECTED
S PYO DNA BLD POS QL NAA+NON-PROBE: NOT DETECTED
SALMONELLA DNA BLD POS QL NAA+NON-PROBE: NOT DETECTED
SERVICE CMNT-IMP: ABNORMAL
SERVICE CMNT-IMP: ABNORMAL
SPECIMEN DESCRIPTION: ABNORMAL
SPECIMEN DESCRIPTION: ABNORMAL
SPECIMEN DESCRIPTION: NORMAL
STREPTOCOCCUS DNA BLD POS NAA+NON-PROBE: NOT DETECTED

## 2024-02-14 PROCEDURE — 6370000000 HC RX 637 (ALT 250 FOR IP): Performed by: CLINICAL NURSE SPECIALIST

## 2024-02-14 PROCEDURE — 2700000000 HC OXYGEN THERAPY PER DAY

## 2024-02-14 PROCEDURE — 94660 CPAP INITIATION&MGMT: CPT

## 2024-02-14 PROCEDURE — 6360000002 HC RX W HCPCS: Performed by: NURSE PRACTITIONER

## 2024-02-14 PROCEDURE — 2060000000 HC ICU INTERMEDIATE R&B

## 2024-02-14 PROCEDURE — 99232 SBSQ HOSP IP/OBS MODERATE 35: CPT | Performed by: INTERNAL MEDICINE

## 2024-02-14 PROCEDURE — 6370000000 HC RX 637 (ALT 250 FOR IP): Performed by: STUDENT IN AN ORGANIZED HEALTH CARE EDUCATION/TRAINING PROGRAM

## 2024-02-14 PROCEDURE — 2500000003 HC RX 250 WO HCPCS: Performed by: INTERNAL MEDICINE

## 2024-02-14 PROCEDURE — 99231 SBSQ HOSP IP/OBS SF/LOW 25: CPT | Performed by: NURSE PRACTITIONER

## 2024-02-14 PROCEDURE — 82962 GLUCOSE BLOOD TEST: CPT

## 2024-02-14 PROCEDURE — 6360000002 HC RX W HCPCS: Performed by: STUDENT IN AN ORGANIZED HEALTH CARE EDUCATION/TRAINING PROGRAM

## 2024-02-14 PROCEDURE — 94640 AIRWAY INHALATION TREATMENT: CPT

## 2024-02-14 PROCEDURE — 6370000000 HC RX 637 (ALT 250 FOR IP): Performed by: NURSE PRACTITIONER

## 2024-02-14 PROCEDURE — 6360000002 HC RX W HCPCS: Performed by: CLINICAL NURSE SPECIALIST

## 2024-02-14 RX ADMIN — CALCIUM ACETATE 667 MG: 667 CAPSULE ORAL at 09:14

## 2024-02-14 RX ADMIN — HEPARIN SODIUM 5000 UNITS: 10000 INJECTION INTRAVENOUS; SUBCUTANEOUS at 06:38

## 2024-02-14 RX ADMIN — COLLAGENASE SANTYL: 250 OINTMENT TOPICAL at 09:16

## 2024-02-14 RX ADMIN — GUAIFENESIN 400 MG: 400 TABLET ORAL at 21:04

## 2024-02-14 RX ADMIN — OXYCODONE 5 MG: 5 TABLET ORAL at 21:04

## 2024-02-14 RX ADMIN — METOPROLOL SUCCINATE 25 MG: 25 TABLET, EXTENDED RELEASE ORAL at 09:15

## 2024-02-14 RX ADMIN — HYDROMORPHONE HYDROCHLORIDE 1 MG: 1 INJECTION, SOLUTION INTRAMUSCULAR; INTRAVENOUS; SUBCUTANEOUS at 13:22

## 2024-02-14 RX ADMIN — GUAIFENESIN 400 MG: 400 TABLET ORAL at 17:52

## 2024-02-14 RX ADMIN — PETROLATUM: 420 OINTMENT TOPICAL at 09:15

## 2024-02-14 RX ADMIN — MUPIROCIN: 20 OINTMENT TOPICAL at 21:04

## 2024-02-14 RX ADMIN — MUPIROCIN: 20 OINTMENT TOPICAL at 09:15

## 2024-02-14 RX ADMIN — HEPARIN SODIUM 5000 UNITS: 10000 INJECTION INTRAVENOUS; SUBCUTANEOUS at 21:05

## 2024-02-14 RX ADMIN — HYDROMORPHONE HYDROCHLORIDE 0.5 MG: 1 INJECTION, SOLUTION INTRAMUSCULAR; INTRAVENOUS; SUBCUTANEOUS at 02:52

## 2024-02-14 RX ADMIN — CALCIUM ACETATE 667 MG: 667 CAPSULE ORAL at 17:52

## 2024-02-14 RX ADMIN — ALBUTEROL SULFATE 2.5 MG: 2.5 SOLUTION RESPIRATORY (INHALATION) at 15:35

## 2024-02-14 RX ADMIN — EPOETIN ALFA-EPBX 6000 UNITS: 3000 INJECTION, SOLUTION INTRAVENOUS; SUBCUTANEOUS at 17:52

## 2024-02-14 RX ADMIN — POLYETHYLENE GLYCOL 3350 17 G: 17 POWDER, FOR SOLUTION ORAL at 09:38

## 2024-02-14 RX ADMIN — SENNOSIDES 17.2 MG: 8.6 TABLET, COATED ORAL at 21:04

## 2024-02-14 RX ADMIN — SENNOSIDES 17.2 MG: 8.6 TABLET, COATED ORAL at 09:15

## 2024-02-14 RX ADMIN — GUAIFENESIN 400 MG: 400 TABLET ORAL at 09:15

## 2024-02-14 ASSESSMENT — PAIN - FUNCTIONAL ASSESSMENT
PAIN_FUNCTIONAL_ASSESSMENT: PREVENTS OR INTERFERES SOME ACTIVE ACTIVITIES AND ADLS
PAIN_FUNCTIONAL_ASSESSMENT: PREVENTS OR INTERFERES SOME ACTIVE ACTIVITIES AND ADLS
PAIN_FUNCTIONAL_ASSESSMENT: ACTIVITIES ARE NOT PREVENTED

## 2024-02-14 ASSESSMENT — PAIN DESCRIPTION - DESCRIPTORS
DESCRIPTORS: STABBING
DESCRIPTORS: ACHING
DESCRIPTORS: ACHING;SHARP

## 2024-02-14 ASSESSMENT — PAIN SCALES - GENERAL
PAINLEVEL_OUTOF10: 5
PAINLEVEL_OUTOF10: 9
PAINLEVEL_OUTOF10: 8
PAINLEVEL_OUTOF10: 0
PAINLEVEL_OUTOF10: 0
PAINLEVEL_OUTOF10: 3
PAINLEVEL_OUTOF10: 0
PAINLEVEL_OUTOF10: 8

## 2024-02-14 ASSESSMENT — PAIN DESCRIPTION - FREQUENCY: FREQUENCY: CONTINUOUS

## 2024-02-14 ASSESSMENT — PAIN DESCRIPTION - LOCATION
LOCATION: BUTTOCKS
LOCATION: GENERALIZED;ABDOMEN
LOCATION: GENERALIZED

## 2024-02-14 ASSESSMENT — PAIN DESCRIPTION - PAIN TYPE: TYPE: CHRONIC PAIN

## 2024-02-14 ASSESSMENT — PAIN DESCRIPTION - ONSET: ONSET: ON-GOING

## 2024-02-14 NOTE — PROGRESS NOTES
Fecal impaction noted. Attempted to disimpact patient without success. Patient screaming \"Do not touch me, leave me alone\" Patient refused dressing changes. States \" I just want to die. Please let me die\"

## 2024-02-14 NOTE — PROGRESS NOTES
[facility] Hospitalist Progress Note    Admitting Date and Time: 2/10/2024  2:16 AM  Admit Dx: Pleural effusion on left [J90]  Acute respiratory failure with hypoxia (HCC) [J96.01]    Subjective:  Patient is being followed for Pleural effusion on left [J90]  Acute respiratory failure with hypoxia (HCC) [J96.01]   Pt seen and examined this morning  No acute events overnight  Lying in bed, in mild distress.  Came with hospices at bedside and discussing case with patient's daughter Inga.  Patient unable to make a decision at this time.  States she just wants to have a bowel movement, which has been an issue for the last few days, and then she will be able to think better and make a decision    ROS: denies fever, chills, cp, sob, n/v, HA unless stated above.      collagenase   Topical Daily    mupirocin   Each Nostril BID    calcium acetate  1 capsule Oral TID WC    epoetin derek-epbx  6,000 Units SubCUTAneous Once per day on Mon Wed Fri    insulin lispro  0-4 Units SubCUTAneous TID WC    insulin lispro  0-4 Units SubCUTAneous Nightly    metoprolol succinate  25 mg Oral Daily    midodrine  15 mg Oral Once per day on Mon Tue Thu Fri    senna  2 tablet Oral BID    heparin (porcine)  5,000 Units SubCUTAneous 3 times per day    albuterol  2.5 mg Nebulization 4x Daily RT    guaiFENesin  400 mg Oral TID     HYDROmorphone, 1 mg, Q3H PRN  oxyCODONE, 5 mg, Q4H PRN  bisacodyl, 10 mg, Daily PRN  acetaminophen, 1,000 mg, Q8H PRN  polyethylene glycol, 17 g, Daily PRN  white petrolatum, , BID PRN  prochlorperazine, 10 mg, Q6H PRN         Objective:    BP (!) 101/48   Pulse 95   Temp 97.5 °F (36.4 °C) (Axillary)   Resp 18   Ht 1.549 m (5' 1\")   Wt 69 kg (152 lb 1.9 oz)   SpO2 100%   BMI 28.74 kg/m²     General Appearance: Alert and awake, in mild distress  Skin: warm and dry  Head: normocephalic and atraumatic  Eyes: pupils equal, round, and reactive to light, extraocular eye movements intact, conjunctivae

## 2024-02-14 NOTE — PROGRESS NOTES
Date: 2/13/2024    Time: 9:36 PM    Patient Placed On BIPAP/CPAP/ Non-Invasive Ventilation?  No      Comments:  Patient is refusing NIV of 12/6 this evening.  Patient  remains on a 3 liter nasal cannula at this time.  Patient has no shortness of breath and her pulse ox is 94% at this time.  NIV remains plugged in at bedside on standby.        Marilin hammer RCP

## 2024-02-14 NOTE — PROGRESS NOTES
[facility]  [facility]  Progress Note    SUBJECTIVE:  Chief Complaint   Patient presents with    Shortness of Breath     SOB, hypoxic on roomair (baseline)     No fevers.    Review of systems:  As stated above in the chief complaint, otherwise negative.    Medications:  Scheduled Meds:   collagenase   Topical Daily    mupirocin   Each Nostril BID    calcium acetate  1 capsule Oral TID     epoetin derek-epbx  6,000 Units SubCUTAneous Once per day on     insulin lispro  0-4 Units SubCUTAneous TID WC    insulin lispro  0-4 Units SubCUTAneous Nightly    metoprolol succinate  25 mg Oral Daily    midodrine  15 mg Oral Once per day on     senna  2 tablet Oral BID    heparin (porcine)  5,000 Units SubCUTAneous 3 times per day    albuterol  2.5 mg Nebulization 4x Daily RT    guaiFENesin  400 mg Oral TID     Continuous Infusions:  PRN Meds:HYDROmorphone, oxyCODONE, bisacodyl, acetaminophen, polyethylene glycol, white petrolatum, prochlorperazine    OBJECTIVE:  BP (!) 154/74   Pulse 91   Temp 98 °F (36.7 °C) (Oral)   Resp 18   Ht 1.549 m (5' 1\")   Wt 69 kg (152 lb 1.9 oz)   SpO2 (!) 88%   BMI 28.74 kg/m²   Temp  Av.5 °F (36.4 °C)  Min: 97.1 °F (36.2 °C)  Max: 98 °F (36.7 °C)  Constitutional: The patient is in bed.  She is asleep.  No distress.  Skin: Warm and dry.  Left breast wound covered.  HEENT: Round and reactive pupils.  Moist mucous membranes.  No ulcerations or thrush.  Neck: Supple to movements.   Chest: No respiratory distress.  No crackles.  Small bore chest tube with serous fluid.  Cardiovascular: S1 and S2 are rhythmic and regular. No murmurs appreciated.   Abdomen: Positive bowel sounds to auscultation. Benign to palpation.  Back: Stage IV sacral decubitus and buttock wounds covered with dressings.  Extremities: Minimal edema.  Lines: Peripheral.    Laboratory and Tests:  Lab Results   Component Value Date    .0 (H) 2023    CRP

## 2024-02-14 NOTE — PROGRESS NOTES
HOSPICE [facility]    Visited patient at bedside. She is alert and her main complaint is being constipated and having discomfort from this. Daughter Inga and a physician are in room. Discussed with [facility] hospice philosophy. Explained that she would have to stop dialysis under hospice care. Daughter Inga doesn't feel her mother completely understands hospice and would like a follow up tomorrow after further discussions with her mother and siblings.     CM and charge nurse updated.     Electronically signed by Henny Schultz RN on 2/14/2024 at 10:35 AM  [phone]; [phone]

## 2024-02-14 NOTE — PROGRESS NOTES
Pulmonary Progress Note    Admit Date: 2/10/2024                            PCP: Rj Casillas MD  Principal Problem:    Acute respiratory failure with hypoxia (HCC)  Active Problems:    Pleural effusion on left  Resolved Problems:    * No resolved hospital problems. *      Subjective:  Resting in bed on RA , pox 88%      Medications:       collagenase   Topical Daily    mupirocin   Each Nostril BID    calcium acetate  1 capsule Oral TID WC    epoetin derek-epbx  6,000 Units SubCUTAneous Once per day on     insulin lispro  0-4 Units SubCUTAneous TID WC    insulin lispro  0-4 Units SubCUTAneous Nightly    metoprolol succinate  25 mg Oral Daily    midodrine  15 mg Oral Once per day on     senna  2 tablet Oral BID    heparin (porcine)  5,000 Units SubCUTAneous 3 times per day    albuterol  2.5 mg Nebulization 4x Daily RT    guaiFENesin  400 mg Oral TID       Vitals:  VITALS:  BP (!) 154/74   Pulse 91   Temp 98 °F (36.7 °C) (Oral)   Resp 18   Ht 1.549 m (5' 1\")   Wt 69 kg (152 lb 1.9 oz)   SpO2 (!) 88%   BMI 28.74 kg/m²   24HR INTAKE/OUTPUT:    Intake/Output Summary (Last 24 hours) at 2024 1042  Last data filed at 2024 2100  Gross per 24 hour   Intake --   Output 0 ml   Net 0 ml     CURRENT PULSE OXIMETRY:  SpO2: (!) 88 %  24HR PULSE OXIMETRY RANGE:  SpO2  Av.5 %  Min: 88 %  Max: 98 %  CVP:    VENT SETTINGS:      Additional Respiratory Assessments  Pulse: 91  Respirations: 18  SpO2: (!) 88 %      EXAM:  General: Alert, in NAD  ENT: No discharge. Pharynx clear. membranes moist   Neck: Supple, Trachea midline.  Resp: No accessory muscle use. Non-labored.  Lungs diminished > left  Left side Chest tube , no leak   CV: Regular rate. Regular rhythm. No murmur . No edema.   ABD: Non-tender. Non-distended. Soft, round . Normal bowel sounds.   Skin: Warm and dry.   M/S: No cyanosis. No joint deformity. No clubbing.   Neuro: Awake. Follows commands. O x 1-2, PRADO  Psych:  calm  not a suspected or confirmed emergency medical condition->Emergency Medical Condition (MA) FINDINGS: Mediastinum: The heart is normal in size without pericardial effusion.  There is dense annular calcification of the mitral valve.  There are calcified mediastinal lymph nodes. Lungs/pleura: Large left pleural effusion with near complete left lung atelectasis.  There are patchy ground-glass opacities within the right lung. No pneumothorax. Upper Abdomen:  Limited images of the upper abdomen demonstrate no acute abnormality. Soft Tissues/Bones:  No acute abnormality of the visualized osseous structures.     1. Large left pleural effusion with near complete left lung atelectasis. 2. Patchy ground-glass opacities right lung may be infectious or inflammatory in etiology.     XR CHEST PORTABLE    Result Date: 2/10/2024  EXAMINATION: ONE XRAY VIEW OF THE CHEST 2/10/2024 2:17 am COMPARISON: 01/14/2024 HISTORY: ORDERING SYSTEM PROVIDED HISTORY: shortness of breath TECHNOLOGIST PROVIDED HISTORY: Reason for exam:->shortness of breath FINDINGS: There is near complete left lung white out.  The left heart border is obscured.  Mild rightward mediastinal shift, although this may be accentuated due to patient positioning.  The right lung is clear.  No pneumothorax.     Near complete left lung white out with mild rightward mediastinal shift. This may be due to a combination of pleural effusion, atelectasis, and/or pneumonia.     2/13:    IMPRESSION:  1. The left pleural catheter has been partially withdrawn tip remains near  the left hilum. No evidence of pneumothorax.  2. Hazy interstitial opacity in a perihilar distribution similar to the prior  examination.       73 YOF, from NH, with PMH ESRD on HD, HFpEF 60% (Echo 2022) with history of sacral ulcer and breast wound confused at baseline, difficulty breathing with hypoxia      2/10 POX 70% on RA required bipap 12/6  Chest film near complete whiteout left side   cxr improved  CT

## 2024-02-14 NOTE — PROGRESS NOTES
02/14/24 0018   NIV Type   $NIV $Daily Charge   Mode (S)  Bilevel  (refuses use , standby)

## 2024-02-14 NOTE — PROGRESS NOTES
[facility]  Nephrology Progress Note  2/14/2024 2:00 PM  Subjective:     Admit Date: 2/10/2024    PCP: Rj Casillas MD    Summary Statement: This is a 73-year-old female with past medical history of end-stage renal disease on hemodialysis known to us as she dialyzes with our practice.  She was hospitalized with worsening shortness of breath.      Interval History:     2/13: Seen and examined. Resting in bed. D/w daughter at bedside. Patient has not been doing well, has voiced desire to die during admission. Was in considerable pain/agitation during HD yesterday.     2/14: Seen and examined. Patient is emotionally distraught. She is unsure of what to do next regarding goals of care. Palliative following. Her main concern at this time is getting disimpacted to have a BM.       Diet: ADULT DIET; Regular    Data:     Scheduled Meds:   collagenase   Topical Daily    mupirocin   Each Nostril BID    calcium acetate  1 capsule Oral TID WC    epoetin derek-epbx  6,000 Units SubCUTAneous Once per day on Mon Wed Fri    insulin lispro  0-4 Units SubCUTAneous TID WC    insulin lispro  0-4 Units SubCUTAneous Nightly    metoprolol succinate  25 mg Oral Daily    midodrine  15 mg Oral Once per day on Mon Tue Thu Fri    senna  2 tablet Oral BID    heparin (porcine)  5,000 Units SubCUTAneous 3 times per day    albuterol  2.5 mg Nebulization 4x Daily RT    guaiFENesin  400 mg Oral TID     Continuous Infusions:      PRN Meds:HYDROmorphone, oxyCODONE, bisacodyl, acetaminophen, polyethylene glycol, white petrolatum, prochlorperazine  No intake/output data recorded.  No intake/output data recorded.    Intake/Output Summary (Last 24 hours) at 2/14/2024 1400  Last data filed at 2/13/2024 2100  Gross per 24 hour   Intake --   Output 0 ml   Net 0 ml     CBC:   Recent Labs     02/12/24  1310 02/13/24  1720   WBC 9.7 7.3   HGB 9.0* 9.8*    223     BMP:    Recent Labs     02/12/24  1310 02/13/24  1720    139   K 4.5 3.9

## 2024-02-14 NOTE — PROGRESS NOTES
Palliative Care Department  [phone]  Palliative Care Progress Note  Provider Antonella Kebede, APRN - CNP     Marisela Mcguire  [id number]  Hospital Day: 5  Date of Initial Consult: 2/12/1024  Referring Provider: Chloe Araujo MD   Palliative Medicine was consulted for assistance with: Goals of care, overwhelming symptoms    HPI:   Marisela Mcguire is a 73 y.o. with a medical history of anemia, anxiety, depression, arthritis, chronic pain syndrome, COVID-19 infection, diabetes mellitus, dysphagia, ESRD on hemodialysis, GERD, hypertension, pressure ulcer of the sacral region, who was admitted on 2/10/2024 from nursing facility with a CHIEF COMPLAINT of respiratory distress, shortness of breath, chest pain. She is on room air at baseline and per EMS was hypoxic 70% on room air. They placed patient on 5 L via NC which increased saturation to high 80s so they put her on cpap. Patient Is tachycardic and hypotensive on arrival. Labs reveal Cl 91, CO2 33, Creatinine 3.5, GFR 13, Glucose 161, BNP 64269, Troponin 100, WBC 20.6, Hgb 10.3. COVID and flu negative. ABG show pO2 217.4, HCO3 29.1. CXR with near complete left lung white out with mild right upward mediastinal shift. CT chest showing large left pleural effusion with near complete left lung atelectasis. Patchy ground-glass opacities in right lung. Pulmonary consulted.  Left pigtail catheter placed.  Surgery, pulmonology, ID, nephrology, oncology following.  Palliative medicine consulted for further assistance.    ASSESSMENT/PLAN:     Pertinent Hospital Diagnoses     Acute hypoxic respiratory failure secondary to large left pleural effusion  Sepsis  Sacral and breast wounds  Leukocytosis  ESRD on HD  HFpEF  Anemia    Palliative Care Encounter / Counseling Regarding Goals of Care  Please see detailed goals of care discussion as below  At this time, Marisela Mcguire, Does Not have capacity for medical decision-making.  Capacity is time limited and situation/question

## 2024-02-14 NOTE — CARE COORDINATION
Social Work:    Joleen at [facility] advised social service &  that daughter Inga is not certain Mrs. Mcguire totally understands the concept of [facility] as Mrs. Mcguire advises that she wants comfort care & dialysis. Inga explained that Mrs. Mcguire is severely constipated and plans to see how she responds once she is able to move her bowls. Inga also advises that cytology results will likely aide in their final decision.  Joleen at [facility] plans to follow-up tomorrow & continue following as needed.  Social service to continue to follow.    Electronically signed by SHANTA Leo on 2/14/2024 at 10:25 AM

## 2024-02-15 VITALS
HEART RATE: 107 BPM | OXYGEN SATURATION: 100 % | WEIGHT: 152.34 LBS | TEMPERATURE: 98.5 F | SYSTOLIC BLOOD PRESSURE: 93 MMHG | HEIGHT: 61 IN | BODY MASS INDEX: 28.76 KG/M2 | DIASTOLIC BLOOD PRESSURE: 72 MMHG | RESPIRATION RATE: 18 BRPM

## 2024-02-15 LAB
ANION GAP SERPL CALCULATED.3IONS-SCNC: 12 MMOL/L (ref 7–16)
BUN SERPL-MCNC: 23 MG/DL (ref 6–23)
CALCIUM SERPL-MCNC: 9 MG/DL (ref 8.6–10.2)
CHLORIDE SERPL-SCNC: 105 MMOL/L (ref 98–107)
CO2 SERPL-SCNC: 24 MMOL/L (ref 22–29)
CREAT SERPL-MCNC: 4.3 MG/DL (ref 0.5–1)
ERYTHROCYTE [DISTWIDTH] IN BLOOD BY AUTOMATED COUNT: 16.1 % (ref 11.5–15)
GFR SERPL CREATININE-BSD FRML MDRD: 10 ML/MIN/1.73M2
GLUCOSE BLD-MCNC: 122 MG/DL (ref 74–99)
GLUCOSE BLD-MCNC: 217 MG/DL (ref 74–99)
GLUCOSE BLD-MCNC: 250 MG/DL (ref 74–99)
GLUCOSE SERPL-MCNC: 176 MG/DL (ref 74–99)
HCT VFR BLD AUTO: 31.6 % (ref 34–48)
HGB BLD-MCNC: 9.4 G/DL (ref 11.5–15.5)
MAGNESIUM SERPL-MCNC: 2.1 MG/DL (ref 1.6–2.6)
MCH RBC QN AUTO: 29.7 PG (ref 26–35)
MCHC RBC AUTO-ENTMCNC: 29.7 G/DL (ref 32–34.5)
MCV RBC AUTO: 100 FL (ref 80–99.9)
PHOSPHATE SERPL-MCNC: 4.1 MG/DL (ref 2.5–4.5)
PLATELET # BLD AUTO: 200 K/UL (ref 130–450)
PMV BLD AUTO: 10.4 FL (ref 7–12)
POTASSIUM SERPL-SCNC: 3.4 MMOL/L (ref 3.5–5)
RBC # BLD AUTO: 3.16 M/UL (ref 3.5–5.5)
SODIUM SERPL-SCNC: 141 MMOL/L (ref 132–146)
WBC OTHER # BLD: 7.4 K/UL (ref 4.5–11.5)

## 2024-02-15 PROCEDURE — 2500000003 HC RX 250 WO HCPCS: Performed by: INTERNAL MEDICINE

## 2024-02-15 PROCEDURE — 6370000000 HC RX 637 (ALT 250 FOR IP): Performed by: STUDENT IN AN ORGANIZED HEALTH CARE EDUCATION/TRAINING PROGRAM

## 2024-02-15 PROCEDURE — 94640 AIRWAY INHALATION TREATMENT: CPT

## 2024-02-15 PROCEDURE — 6360000002 HC RX W HCPCS: Performed by: STUDENT IN AN ORGANIZED HEALTH CARE EDUCATION/TRAINING PROGRAM

## 2024-02-15 PROCEDURE — 82962 GLUCOSE BLOOD TEST: CPT

## 2024-02-15 PROCEDURE — 36415 COLL VENOUS BLD VENIPUNCTURE: CPT

## 2024-02-15 PROCEDURE — 94660 CPAP INITIATION&MGMT: CPT

## 2024-02-15 PROCEDURE — 80048 BASIC METABOLIC PNL TOTAL CA: CPT

## 2024-02-15 PROCEDURE — 84100 ASSAY OF PHOSPHORUS: CPT

## 2024-02-15 PROCEDURE — 6370000000 HC RX 637 (ALT 250 FOR IP): Performed by: CLINICAL NURSE SPECIALIST

## 2024-02-15 PROCEDURE — 6370000000 HC RX 637 (ALT 250 FOR IP): Performed by: NURSE PRACTITIONER

## 2024-02-15 PROCEDURE — 85027 COMPLETE CBC AUTOMATED: CPT

## 2024-02-15 PROCEDURE — 2700000000 HC OXYGEN THERAPY PER DAY

## 2024-02-15 PROCEDURE — 6360000002 HC RX W HCPCS: Performed by: CLINICAL NURSE SPECIALIST

## 2024-02-15 PROCEDURE — 83735 ASSAY OF MAGNESIUM: CPT

## 2024-02-15 PROCEDURE — 90935 HEMODIALYSIS ONE EVALUATION: CPT

## 2024-02-15 PROCEDURE — 6360000002 HC RX W HCPCS

## 2024-02-15 PROCEDURE — 6360000002 HC RX W HCPCS: Performed by: NURSE PRACTITIONER

## 2024-02-15 PROCEDURE — 99239 HOSP IP/OBS DSCHRG MGMT >30: CPT | Performed by: INTERNAL MEDICINE

## 2024-02-15 RX ORDER — CALCIUM ACETATE 667 MG/1
1 CAPSULE ORAL
Qty: 180 CAPSULE | DISCHARGE
Start: 2024-02-15

## 2024-02-15 RX ORDER — ALBUTEROL SULFATE 2.5 MG/3ML
2.5 SOLUTION RESPIRATORY (INHALATION) EVERY 4 HOURS PRN
Qty: 120 EACH | Refills: 3 | DISCHARGE
Start: 2024-02-15

## 2024-02-15 RX ORDER — OXYCODONE HYDROCHLORIDE 5 MG/1
5 TABLET ORAL EVERY 12 HOURS PRN
Qty: 10 TABLET | Refills: 0 | Status: SHIPPED | OUTPATIENT
Start: 2024-02-15 | End: 2024-02-20

## 2024-02-15 RX ADMIN — MICONAZOLE NITRATE: 20 POWDER TOPICAL at 11:08

## 2024-02-15 RX ADMIN — HYDROMORPHONE HYDROCHLORIDE 1 MG: 1 INJECTION, SOLUTION INTRAMUSCULAR; INTRAVENOUS; SUBCUTANEOUS at 04:20

## 2024-02-15 RX ADMIN — COLLAGENASE SANTYL: 250 OINTMENT TOPICAL at 11:09

## 2024-02-15 RX ADMIN — OXYCODONE 5 MG: 5 TABLET ORAL at 01:18

## 2024-02-15 RX ADMIN — SENNOSIDES 17.2 MG: 8.6 TABLET, COATED ORAL at 11:06

## 2024-02-15 RX ADMIN — OXYCODONE 5 MG: 5 TABLET ORAL at 06:44

## 2024-02-15 RX ADMIN — ALBUTEROL SULFATE 2.5 MG: 2.5 SOLUTION RESPIRATORY (INHALATION) at 19:49

## 2024-02-15 RX ADMIN — MUPIROCIN: 20 OINTMENT TOPICAL at 11:08

## 2024-02-15 RX ADMIN — MICONAZOLE NITRATE: 20 POWDER TOPICAL at 06:44

## 2024-02-15 RX ADMIN — CALCIUM ACETATE 667 MG: 667 CAPSULE ORAL at 11:07

## 2024-02-15 RX ADMIN — PROCHLORPERAZINE EDISYLATE 10 MG: 5 INJECTION INTRAMUSCULAR; INTRAVENOUS at 20:16

## 2024-02-15 RX ADMIN — GUAIFENESIN 400 MG: 400 TABLET ORAL at 16:49

## 2024-02-15 RX ADMIN — MIDODRINE HYDROCHLORIDE 15 MG: 5 TABLET ORAL at 09:39

## 2024-02-15 RX ADMIN — CALCIUM ACETATE 667 MG: 667 CAPSULE ORAL at 16:49

## 2024-02-15 RX ADMIN — ALBUTEROL SULFATE 2.5 MG: 2.5 SOLUTION RESPIRATORY (INHALATION) at 13:09

## 2024-02-15 RX ADMIN — OXYCODONE 5 MG: 5 TABLET ORAL at 16:57

## 2024-02-15 RX ADMIN — PETROLATUM: 420 OINTMENT TOPICAL at 11:08

## 2024-02-15 RX ADMIN — ALBUTEROL SULFATE 2.5 MG: 2.5 SOLUTION RESPIRATORY (INHALATION) at 16:55

## 2024-02-15 RX ADMIN — HEPARIN SODIUM 5000 UNITS: 10000 INJECTION INTRAVENOUS; SUBCUTANEOUS at 06:37

## 2024-02-15 RX ADMIN — METOPROLOL SUCCINATE 25 MG: 25 TABLET, EXTENDED RELEASE ORAL at 11:36

## 2024-02-15 RX ADMIN — HYDROMORPHONE HYDROCHLORIDE 1 MG: 1 INJECTION, SOLUTION INTRAMUSCULAR; INTRAVENOUS; SUBCUTANEOUS at 09:49

## 2024-02-15 ASSESSMENT — PAIN DESCRIPTION - ONSET
ONSET: ON-GOING
ONSET: ON-GOING

## 2024-02-15 ASSESSMENT — PAIN DESCRIPTION - LOCATION
LOCATION: GENERALIZED

## 2024-02-15 ASSESSMENT — PAIN DESCRIPTION - PAIN TYPE
TYPE: CHRONIC PAIN

## 2024-02-15 ASSESSMENT — PAIN DESCRIPTION - FREQUENCY
FREQUENCY: CONTINUOUS
FREQUENCY: CONTINUOUS

## 2024-02-15 ASSESSMENT — PAIN SCALES - GENERAL
PAINLEVEL_OUTOF10: 10
PAINLEVEL_OUTOF10: 7
PAINLEVEL_OUTOF10: 8
PAINLEVEL_OUTOF10: 5
PAINLEVEL_OUTOF10: 7
PAINLEVEL_OUTOF10: 4
PAINLEVEL_OUTOF10: 9
PAINLEVEL_OUTOF10: 5
PAINLEVEL_OUTOF10: 4
PAINLEVEL_OUTOF10: 4

## 2024-02-15 ASSESSMENT — PAIN DESCRIPTION - DESCRIPTORS
DESCRIPTORS: ACHING;DISCOMFORT
DESCRIPTORS: ACHING
DESCRIPTORS: ACHING
DESCRIPTORS: ACHING;DISCOMFORT

## 2024-02-15 ASSESSMENT — PAIN - FUNCTIONAL ASSESSMENT
PAIN_FUNCTIONAL_ASSESSMENT: PREVENTS OR INTERFERES SOME ACTIVE ACTIVITIES AND ADLS

## 2024-02-15 NOTE — PLAN OF CARE
Problem: Safety - Adult  Goal: Free from fall injury  Outcome: Not Progressing     Problem: Discharge Planning  Goal: Discharge to home or other facility with appropriate resources  Outcome: Not Progressing     Problem: Skin/Tissue Integrity  Goal: Absence of new skin breakdown  Description: 1.  Monitor for areas of redness and/or skin breakdown  2.  Assess vascular access sites hourly  3.  Every 4-6 hours minimum:  Change oxygen saturation probe site  4.  Every 4-6 hours:  If on nasal continuous positive airway pressure, respiratory therapy assess nares and determine need for appliance change or resting period.  Outcome: Not Progressing     Problem: ABCDS Injury Assessment  Goal: Absence of physical injury  Outcome: Not Progressing     Problem: Pain  Goal: Verbalizes/displays adequate comfort level or baseline comfort level  Outcome: Not Progressing     Problem: Chronic Conditions and Co-morbidities  Goal: Patient's chronic conditions and co-morbidity symptoms are monitored and maintained or improved  Outcome: Not Progressing     Problem: Safety - Adult  Goal: Free from fall injury  Outcome: Not Progressing     Problem: Discharge Planning  Goal: Discharge to home or other facility with appropriate resources  Outcome: Not Progressing     Problem: Skin/Tissue Integrity  Goal: Absence of new skin breakdown  Description: 1.  Monitor for areas of redness and/or skin breakdown  2.  Assess vascular access sites hourly  3.  Every 4-6 hours minimum:  Change oxygen saturation probe site  4.  Every 4-6 hours:  If on nasal continuous positive airway pressure, respiratory therapy assess nares and determine need for appliance change or resting period.  Outcome: Not Progressing     Problem: ABCDS Injury Assessment  Goal: Absence of physical injury  Outcome: Not Progressing     Problem: Pain  Goal: Verbalizes/displays adequate comfort level or baseline comfort level  Outcome: Not Progressing     Problem: Chronic Conditions and

## 2024-02-15 NOTE — PROGRESS NOTES
[facility]   Streptococcus agalactiae (Group B) Not Detected P [facility]   Streptococcus pneumoniae Not Detected P [facility]   Streptococcus pyogenes Not Detected P [facility]   Methicillin Resistance mecA/C  by PCR DETECTED Abnormal  P [facility]              Specimen Collected: 02/10/24 10:33 EST Last Resulted: 02/13/24 09:52 EST               Culture, MRSA, Screening  Order: [id number]  Status: Final result       Visible to patient: Yes (seen)       Next appt: None    Specimen Information: Nares; Nasal swab   0 Result Notes      Component    Specimen Description .NARES   Culture POSITIVE FOR: METHICILLIN RESISTANT STAPHYLOCOCCUS AUREUS Abnormal    Resulting Agency [facility]              Specimen Collected: 02/10/24 17:45 EST            Latest Reference Range & Units 02/10/24 22:15   Chlamydia pneumoniae By PCR Not Detected  Not Detected   Rhino/Enterovirus PCR Not Detected  Not Detected   Human Metapneumovirus PCR Not Detected  Not Detected   Adenovirus PCR Not Detected  Not Detected   B Pertussis by PCR Not Detected  Not Detected   Coronavirus 229E PCR Not Detected  Not Detected   Coronavirus HKU1 PCR Not Detected  Not Detected   Coronavirus NL63 PCR Not Detected  Not Detected   Coronavirus OC Not Detected  Not Detected   Influenza A by PCR Not Detected  Not Detected   Influenza B by PCR Not Detected  Not Detected   Parainfluenza 1 PCR Not Detected  Not Detected   Parainfluenza 2 PCR Not Detected  Not Detected   Parainfluenza 3 PCR Not Detected  Not Detected   Parainfluenza 4 PCR Not Detected  Not Detected   Resp Syncytial Virus PCR Not Detected  Not Detected   Mycoplasma pneumo by PCR Not Detected  Not Detected   Bordetella parapertussis by PCR Not Detected  Not Detected   SARS-CoV-2, PCR Not Detected  Not Detected       Left side pleural fluid  Cytology pending      Latest Reference Range &  condition->Emergency Medical Condition (MA) FINDINGS: Mediastinum: The heart is normal in size without pericardial effusion.  There is dense annular calcification of the mitral valve.  There are calcified mediastinal lymph nodes. Lungs/pleura: Large left pleural effusion with near complete left lung atelectasis.  There are patchy ground-glass opacities within the right lung. No pneumothorax. Upper Abdomen:  Limited images of the upper abdomen demonstrate no acute abnormality. Soft Tissues/Bones:  No acute abnormality of the visualized osseous structures.     1. Large left pleural effusion with near complete left lung atelectasis. 2. Patchy ground-glass opacities right lung may be infectious or inflammatory in etiology.     XR CHEST PORTABLE    Result Date: 2/10/2024  EXAMINATION: ONE XRAY VIEW OF THE CHEST 2/10/2024 2:17 am COMPARISON: 01/14/2024 HISTORY: ORDERING SYSTEM PROVIDED HISTORY: shortness of breath TECHNOLOGIST PROVIDED HISTORY: Reason for exam:->shortness of breath FINDINGS: There is near complete left lung white out.  The left heart border is obscured.  Mild rightward mediastinal shift, although this may be accentuated due to patient positioning.  The right lung is clear.  No pneumothorax.     Near complete left lung white out with mild rightward mediastinal shift. This may be due to a combination of pleural effusion, atelectasis, and/or pneumonia.     2/13:    IMPRESSION:  1. The left pleural catheter has been partially withdrawn tip remains near  the left hilum. No evidence of pneumothorax.  2. Hazy interstitial opacity in a perihilar distribution similar to the prior  examination.       73 YOF, from NH, with PMH ESRD on HD, HFpEF 60% (Echo 2022) with history of sacral ulcer and breast wound confused at baseline, difficulty breathing with hypoxia      2/10 POX 70% on RA required bipap 12/6  Chest film near complete whiteout left side   cxr improved  CT chest showing large effusion with complete

## 2024-02-15 NOTE — FLOWSHEET NOTE
02/15/24 1024   Vital Signs   /65   Temp 98 °F (36.7 °C)   Pulse (!) 126   Respirations 12   Weight - Scale 69.1 kg (152 lb 5.4 oz)   Weight Method Estimated   Percent Weight Change 0   Pain Assessment   Pain Assessment 0-10   Pain Level 7   Pain Type Chronic pain   Post-Hemodialysis Assessment   Post-Treatment Procedures Blood returned;Access bleeding time < 10 minutes   Machine Disinfection Process Acid/Vinegar Clean;Heat Disinfect;Exterior Machine Disinfection   Blood Volume Processed (Liters) 64.3 L   Dialyzer Clearance Clear   Duration of Treatment (minutes) 180 minutes   Hemodialysis Intake (ml) 600 ml   Hemodialysis Output (ml) 68 ml   NET Removed (ml) -532   Tolerated Treatment Fair   Patient Response to Treatment hypotensive,SBP 70s, tachycardic 122, 200 NS given w return to stable VS, 125/65, still tachy, Pt lethargic but arousable to voice, Dr Astudillo aware, pt rinsed back at this time, hemostasis achieved, report called to Chanda   Bilateral Breath Sounds Diminished   Edema None   Physician Notified Yes   Time Off 1020   Patient Disposition Return to room   Observations & Evaluations   Level of Consciousness 1   Oriented X 2   Heart Rhythm Regular   Respiratory Quality/Effort Unlabored   O2 Device Nasal cannula   Skin Color Pink   Skin Condition/Temp Dry;Warm   Comments stable at dc     Post report given to Yusra

## 2024-02-15 NOTE — PROGRESS NOTES
[facility]  [facility]  Progress Note    SUBJECTIVE:  Chief Complaint   Patient presents with    Shortness of Breath     SOB, hypoxic on roomair (baseline)     The patient is feeling better.  No new complaints.  No fevers.    Review of systems:  As stated above in the chief complaint, otherwise negative.    Medications:  Scheduled Meds:   miconazole   Topical BID    collagenase   Topical Daily    mupirocin   Each Nostril BID    calcium acetate  1 capsule Oral TID WC    epoetin derek-epbx  6,000 Units SubCUTAneous Once per day on     insulin lispro  0-4 Units SubCUTAneous TID WC    insulin lispro  0-4 Units SubCUTAneous Nightly    metoprolol succinate  25 mg Oral Daily    midodrine  15 mg Oral Once per day on     senna  2 tablet Oral BID    heparin (porcine)  5,000 Units SubCUTAneous 3 times per day    albuterol  2.5 mg Nebulization 4x Daily RT    guaiFENesin  400 mg Oral TID     Continuous Infusions:  PRN Meds:HYDROmorphone, oxyCODONE, bisacodyl, acetaminophen, polyethylene glycol, white petrolatum, prochlorperazine    OBJECTIVE:  BP (!) 140/96   Pulse (!) 129   Temp 97.3 °F (36.3 °C) (Axillary)   Resp 12   Ht 1.549 m (5' 1\")   Wt 69.1 kg (152 lb 5.4 oz)   SpO2 96%   BMI 28.78 kg/m²   Temp  Av.8 °F (36.6 °C)  Min: 97.3 °F (36.3 °C)  Max: 98.4 °F (36.9 °C)  Constitutional: The patient is lying in bed.  She is awake and alert.  No distress.  Skin: Warm and dry.  Left breast wound covered.  HEENT: Round and reactive pupils.  Moist mucous membranes.  No ulcerations or thrush.  Neck: Supple to movements.   Chest: No respiratory distress.  No crackles.  Small bore chest tube with serous fluid.  Cardiovascular: S1 and S2 are rhythmic and regular. No murmurs appreciated.   Abdomen: Positive bowel sounds to auscultation. Benign to palpation.  Back: Stage IV sacral decubitus and buttock wounds covered with dressings.  Extremities: Minimal edema.  Lines:

## 2024-02-15 NOTE — DISCHARGE SUMMARY
[facility] Hospitalist Physician Discharge Summary       [facility]  [address] [address]  [phone]          Activity level: As tolerated     Dispo: [facility]      Condition on discharge: Stable     Patient ID:  Marisela Mcguire  [id number]  73 y.o.  1950    Admit date: 2/10/2024    Discharge date and time:  2/15/2024  3:04 PM    Admission Diagnoses: Principal Problem:    Acute respiratory failure with hypoxia (HCC)  Active Problems:    Pleural effusion on left  Resolved Problems:    * No resolved hospital problems. *      Discharge Diagnoses: Principal Problem:    Acute respiratory failure with hypoxia (HCC)  Active Problems:    Pleural effusion on left  Resolved Problems:    * No resolved hospital problems. *      Consults:  IP CONSULT TO CRITICAL CARE  IP CONSULT TO PULMONOLOGY  IP CONSULT TO PHARMACY  IP CONSULT TO NEPHROLOGY  IP CONSULT TO IV TEAM  IP CONSULT TO GENERAL SURGERY  IP CONSULT TO IV TEAM  IP CONSULT TO INFECTIOUS DISEASES  IP CONSULT TO IV TEAM  IP CONSULT TO PALLIATIVE CARE  IP CONSULT TO IV TEAM  IP CONSULT TO IV TEAM  IP CONSULT TO HOSPICE    Hospital Course:   Patient Marisela Mcguire is a 73 y.o. presented with Pleural effusion on left [J90]  Acute respiratory failure with hypoxia (HCC) [J96.01]    Patient is a 73-year-old female who presented to the ED from [facility] due to chest pain and respiratory distress..  She was hypoxic at 70% on room air requiring 5 L NC in the ED.  Noted large left-sided pleural effusion and a pigtail catheter was placed in the ED that drained 1700 cc of serosanguineous fluid.  Cultures came back negative but cytology concerning for B-cell lymphoma.  She was noted to have multiple wounds on her abdomen as well as a big sacral and breast folds.  GS and ID consulted, wounds do not look infected so she was monitored off antibiotics.  GS recommended debridement of left breast wound but patient refused.  HD  lung may be infectious or inflammatory in etiology.     XR CHEST PORTABLE    Result Date: 2/10/2024  EXAMINATION: ONE XRAY VIEW OF THE CHEST 2/10/2024 2:17 am COMPARISON: 01/14/2024 HISTORY: ORDERING SYSTEM PROVIDED HISTORY: shortness of breath TECHNOLOGIST PROVIDED HISTORY: Reason for exam:->shortness of breath FINDINGS: There is near complete left lung white out.  The left heart border is obscured.  Mild rightward mediastinal shift, although this may be accentuated due to patient positioning.  The right lung is clear.  No pneumothorax.     Near complete left lung white out with mild rightward mediastinal shift. This may be due to a combination of pleural effusion, atelectasis, and/or pneumonia.       Patient Instructions:      Medication List        START taking these medications      albuterol (2.5 MG/3ML) 0.083% nebulizer solution  Commonly known as: PROVENTIL  Take 3 mLs by nebulization every 4 hours as needed for Wheezing     calcium acetate 667 MG Caps capsule  Commonly known as: PHOSLO  Take 1 capsule by mouth 3 times daily (with meals)            CONTINUE taking these medications      acetaminophen 500 MG tablet  Commonly known as: TYLENOL     amitriptyline 25 MG tablet  Commonly known as: ELAVIL     Aspercreme Pain Relief Patch 0.025 % Ptch  Generic drug: Capsaicin     bisacodyl 10 MG suppository  Commonly known as: DULCOLAX  Place 1 suppository rectally daily as needed for Constipation     calcium carbonate 500 MG chewable tablet  Commonly known as: TUMS     epoetin derek-epbx 3000 UNIT/ML Soln injection  Commonly known as: RETACRIT  Inject 2 mLs into the skin Every Monday, Wednesday, and Friday     Glucose 15 GM/32ML Gel  Commonly known as: TRUEPLUS  Take 32 mLs by mouth as needed (hypoglycemia)     hydrOXYzine pamoate 25 MG capsule  Commonly known as: VISTARIL     * insulin lispro 100 UNIT/ML Soln injection vial  Commonly known as: HUMALOG  Inject 0-4 Units into the skin 3 times daily (with meals)     *

## 2024-02-15 NOTE — PLAN OF CARE
Problem: Safety - Adult  Goal: Free from fall injury  Outcome: Completed     Problem: Discharge Planning  Goal: Discharge to home or other facility with appropriate resources  Outcome: Completed     Problem: Skin/Tissue Integrity  Goal: Absence of new skin breakdown  Outcome: Completed     Problem: ABCDS Injury Assessment  Goal: Absence of physical injury  Outcome: Completed     Problem: Pain  Goal: Verbalizes/displays adequate comfort level or baseline comfort level  Outcome: Completed     Problem: Chronic Conditions and Co-morbidities  Goal: Patient's chronic conditions and co-morbidity symptoms are monitored and maintained or improved  Outcome: Completed

## 2024-02-15 NOTE — PROGRESS NOTES
[facility]  Nephrology Progress Note  2/15/2024 3:45 PM  Subjective:     Admit Date: 2/10/2024    PCP: Rj Casillas MD    Summary Statement: This is a 73-year-old female with past medical history of end-stage renal disease on hemodialysis known to us as she dialyzes with our practice.  She was hospitalized with worsening shortness of breath.      Interval History:     2/13: Seen and examined. Resting in bed. D/w daughter at bedside. Patient has not been doing well, has voiced desire to die during admission. Was in considerable pain/agitation during HD yesterday.     2/14: Seen and examined. Patient is emotionally distraught. She is unsure of what to do next regarding goals of care. Palliative following. Her main concern at this time is getting disimpacted to have a BM.     2/15: Seen undergoing dialysis at 10:15 AM. Patient had gotten some dilaudid earlier during treatment and was a little tachycardic and uncomfortable. She was taken off dialysis about an hour early. BP was stable, she was rinsed back. Patient leaning toward continuing HD.       Diet: ADULT DIET; Regular    Data:     Scheduled Meds:   miconazole   Topical BID    collagenase   Topical Daily    mupirocin   Each Nostril BID    calcium acetate  1 capsule Oral TID WC    epoetin derek-epbx  6,000 Units SubCUTAneous Once per day on Mon Wed Fri    insulin lispro  0-4 Units SubCUTAneous TID WC    insulin lispro  0-4 Units SubCUTAneous Nightly    metoprolol succinate  25 mg Oral Daily    midodrine  15 mg Oral Once per day on Mon Tue Thu Fri    senna  2 tablet Oral BID    heparin (porcine)  5,000 Units SubCUTAneous 3 times per day    albuterol  2.5 mg Nebulization 4x Daily RT    guaiFENesin  400 mg Oral TID     Continuous Infusions:      PRN Meds:HYDROmorphone, oxyCODONE, bisacodyl, acetaminophen, polyethylene glycol, white petrolatum, prochlorperazine  I/O last 3 completed shifts:  In: 540 [P.O.:540]  Out: 0   I/O this shift:  In: 600   Out: 68

## 2024-02-15 NOTE — CARE COORDINATION
Social Work:    Social service was advised by Dr. Araujo that patient's cytology results are + B-cell lymphoma. Dr. Araujo advises that he plans to meet with Mrs. Mcguire and daughter Inga at 1:30 p.m. to update patient & expects to discharge Mrs. Mcguire today. Social work spoke with Inga who confirmed that they will likely return to [facility] and consider options in near future. Social work arranged Physician ambulance tentatively for 5:30 p.m. pending confirmation of final discharge.     Electronically signed by SHANTA Leo on 2/15/2024 at 1:10 PM    Addendum:    Denise at [facility] received dialysis approval for return and is aware of tentative plans.    Electronically signed by SHANTA Leo on 2/15/2024 at 1:13 PM

## 2024-02-15 NOTE — PROGRESS NOTES
Informed patient and daughter Inga, patient will be returning to [facility]. verbalized understands understanding

## 2024-02-15 NOTE — PROGRESS NOTES
[facility]    Made visit to bedside. Chasity is sitting up alert. She states she does not have questions for Hospice at this time.  Spoke with case management, Wagner who thought he heard daughter would be here at 1330  Called daughter, Inga. She confirmed meeting with Dr. Araujo at 1330 with her mom to discuss condition. Inga will call this nurse if they would like to speak to me today following conversation with physician, if not she is agreeable to a follow-up tomorrow.     Electronically signed by Nadeen Curry RN on 2/15/2024 at 12:12 PM   [phone]

## 2024-05-06 NOTE — PROGRESS NOTES
Spoke with Dali Lawton, nurse at [facility]. Informed her we faxed Rx for breast u/s to be signed by physician 2/7 and have yet to receive, patient is scheduled Monday 2/14 here at [facility]. Per Dali Lawton patient is currently hospitalized. Appt for Monday cancelled. intermittent
